# Patient Record
Sex: FEMALE | Race: WHITE | NOT HISPANIC OR LATINO
[De-identification: names, ages, dates, MRNs, and addresses within clinical notes are randomized per-mention and may not be internally consistent; named-entity substitution may affect disease eponyms.]

---

## 2017-01-25 ENCOUNTER — RX RENEWAL (OUTPATIENT)
Age: 62
End: 2017-01-25

## 2017-02-10 ENCOUNTER — RX RENEWAL (OUTPATIENT)
Age: 62
End: 2017-02-10

## 2017-02-16 ENCOUNTER — RX RENEWAL (OUTPATIENT)
Age: 62
End: 2017-02-16

## 2017-03-29 ENCOUNTER — RX RENEWAL (OUTPATIENT)
Age: 62
End: 2017-03-29

## 2017-04-04 ENCOUNTER — RESULT CHARGE (OUTPATIENT)
Age: 62
End: 2017-04-04

## 2017-04-04 ENCOUNTER — APPOINTMENT (OUTPATIENT)
Dept: INTERNAL MEDICINE | Facility: CLINIC | Age: 62
End: 2017-04-04

## 2017-04-04 VITALS
BODY MASS INDEX: 51.91 KG/M2 | WEIGHT: 293 LBS | SYSTOLIC BLOOD PRESSURE: 130 MMHG | HEIGHT: 63 IN | DIASTOLIC BLOOD PRESSURE: 90 MMHG

## 2017-04-04 LAB
GLUCOSE BLDC GLUCOMTR-MCNC: 126
HBA1C MFR BLD HPLC: 6.8

## 2017-04-19 ENCOUNTER — OTHER (OUTPATIENT)
Age: 62
End: 2017-04-19

## 2017-04-26 ENCOUNTER — RX RENEWAL (OUTPATIENT)
Age: 62
End: 2017-04-26

## 2017-04-27 LAB
25(OH)D3 SERPL-MCNC: 14.8 NG/ML
ALBUMIN SERPL ELPH-MCNC: 4.1 G/DL
ALP BLD-CCNC: 107 U/L
ALT SERPL-CCNC: 25 U/L
ANION GAP SERPL CALC-SCNC: 19 MMOL/L
AST SERPL-CCNC: 31 U/L
BASOPHILS # BLD AUTO: 0.07 K/UL
BASOPHILS NFR BLD AUTO: 0.7 %
BILIRUB SERPL-MCNC: 0.4 MG/DL
BUN SERPL-MCNC: 22 MG/DL
CALCIUM SERPL-MCNC: 9.9 MG/DL
CHLORIDE SERPL-SCNC: 100 MMOL/L
CHOLEST SERPL-MCNC: 218 MG/DL
CHOLEST/HDLC SERPL: 3 RATIO
CO2 SERPL-SCNC: 22 MMOL/L
CREAT SERPL-MCNC: 1.35 MG/DL
EOSINOPHIL # BLD AUTO: 0.19 K/UL
EOSINOPHIL NFR BLD AUTO: 2 %
GLUCOSE SERPL-MCNC: 106 MG/DL
HBA1C MFR BLD HPLC: 7.8 %
HCT VFR BLD CALC: 45.8 %
HDLC SERPL-MCNC: 73 MG/DL
HGB BLD-MCNC: 14.1 G/DL
IMM GRANULOCYTES NFR BLD AUTO: 1 %
LDLC SERPL CALC-MCNC: 114 MG/DL
LYMPHOCYTES # BLD AUTO: 2.25 K/UL
LYMPHOCYTES NFR BLD AUTO: 23.8 %
MAN DIFF?: NORMAL
MCHC RBC-ENTMCNC: 29.7 PG
MCHC RBC-ENTMCNC: 30.8 GM/DL
MCV RBC AUTO: 96.6 FL
MONOCYTES # BLD AUTO: 0.68 K/UL
MONOCYTES NFR BLD AUTO: 7.2 %
NEUTROPHILS # BLD AUTO: 6.19 K/UL
NEUTROPHILS NFR BLD AUTO: 65.3 %
PLATELET # BLD AUTO: 240 K/UL
POTASSIUM SERPL-SCNC: 4.8 MMOL/L
PROT SERPL-MCNC: 7.1 G/DL
RBC # BLD: 4.74 M/UL
RBC # FLD: 13.9 %
SODIUM SERPL-SCNC: 141 MMOL/L
T4 FREE SERPL-MCNC: 0.9 NG/DL
TRIGL SERPL-MCNC: 155 MG/DL
TSH SERPL-ACNC: 1.4 UIU/ML
WBC # FLD AUTO: 9.47 K/UL

## 2017-05-23 ENCOUNTER — MEDICATION RENEWAL (OUTPATIENT)
Age: 62
End: 2017-05-23

## 2017-05-25 ENCOUNTER — MEDICATION RENEWAL (OUTPATIENT)
Age: 62
End: 2017-05-25

## 2017-06-20 ENCOUNTER — MEDICATION RENEWAL (OUTPATIENT)
Age: 62
End: 2017-06-20

## 2017-06-21 ENCOUNTER — RX RENEWAL (OUTPATIENT)
Age: 62
End: 2017-06-21

## 2017-06-23 RX ORDER — LANCETS
EACH MISCELLANEOUS
Qty: 120 | Refills: 3 | Status: DISCONTINUED | COMMUNITY
Start: 2017-06-20 | End: 2017-06-23

## 2017-07-19 ENCOUNTER — MEDICATION RENEWAL (OUTPATIENT)
Age: 62
End: 2017-07-19

## 2017-07-25 ENCOUNTER — APPOINTMENT (OUTPATIENT)
Dept: INTERNAL MEDICINE | Facility: CLINIC | Age: 62
End: 2017-07-25

## 2017-08-21 ENCOUNTER — RX RENEWAL (OUTPATIENT)
Age: 62
End: 2017-08-21

## 2017-08-23 ENCOUNTER — APPOINTMENT (OUTPATIENT)
Dept: INTERNAL MEDICINE | Facility: CLINIC | Age: 62
End: 2017-08-23

## 2017-08-25 ENCOUNTER — APPOINTMENT (OUTPATIENT)
Dept: INTERNAL MEDICINE | Facility: CLINIC | Age: 62
End: 2017-08-25

## 2017-08-30 ENCOUNTER — APPOINTMENT (OUTPATIENT)
Dept: INTERNAL MEDICINE | Facility: CLINIC | Age: 62
End: 2017-08-30

## 2017-09-06 ENCOUNTER — MEDICATION RENEWAL (OUTPATIENT)
Age: 62
End: 2017-09-06

## 2017-09-06 ENCOUNTER — APPOINTMENT (OUTPATIENT)
Dept: INTERNAL MEDICINE | Facility: CLINIC | Age: 62
End: 2017-09-06
Payer: MEDICARE

## 2017-09-06 ENCOUNTER — RESULT CHARGE (OUTPATIENT)
Age: 62
End: 2017-09-06

## 2017-09-06 VITALS — SYSTOLIC BLOOD PRESSURE: 132 MMHG | DIASTOLIC BLOOD PRESSURE: 60 MMHG

## 2017-09-06 LAB — GLUCOSE BLDC GLUCOMTR-MCNC: 113

## 2017-09-06 PROCEDURE — 82962 GLUCOSE BLOOD TEST: CPT

## 2017-09-06 PROCEDURE — 99214 OFFICE O/P EST MOD 30 MIN: CPT | Mod: 25

## 2017-09-06 RX ORDER — CEPHALEXIN 500 MG/1
500 TABLET ORAL 3 TIMES DAILY
Qty: 30 | Refills: 0 | Status: COMPLETED | COMMUNITY
Start: 2017-09-06 | End: 2017-09-16

## 2017-09-27 ENCOUNTER — APPOINTMENT (OUTPATIENT)
Dept: CHRONIC DISEASE MANAGEMENT | Facility: CLINIC | Age: 62
End: 2017-09-27

## 2017-10-17 ENCOUNTER — MEDICATION RENEWAL (OUTPATIENT)
Age: 62
End: 2017-10-17

## 2017-11-06 ENCOUNTER — RX RENEWAL (OUTPATIENT)
Age: 62
End: 2017-11-06

## 2017-11-08 ENCOUNTER — MEDICATION RENEWAL (OUTPATIENT)
Age: 62
End: 2017-11-08

## 2017-11-10 ENCOUNTER — LABORATORY RESULT (OUTPATIENT)
Age: 62
End: 2017-11-10

## 2017-11-16 ENCOUNTER — RX RENEWAL (OUTPATIENT)
Age: 62
End: 2017-11-16

## 2017-12-05 ENCOUNTER — APPOINTMENT (OUTPATIENT)
Dept: INTERNAL MEDICINE | Facility: CLINIC | Age: 62
End: 2017-12-05
Payer: MEDICARE

## 2017-12-05 VITALS — DIASTOLIC BLOOD PRESSURE: 50 MMHG | SYSTOLIC BLOOD PRESSURE: 114 MMHG

## 2017-12-05 DIAGNOSIS — Z85.42 PERSONAL HISTORY OF MALIGNANT NEOPLASM OF OTHER PARTS OF UTERUS: ICD-10-CM

## 2017-12-05 DIAGNOSIS — L97.522 NON-PRESSURE CHRONIC ULCER OF OTHER PART OF LEFT FOOT WITH FAT LAYER EXPOSED: ICD-10-CM

## 2017-12-05 DIAGNOSIS — Z86.31 PERSONAL HISTORY OF DIABETIC FOOT ULCER: ICD-10-CM

## 2017-12-05 DIAGNOSIS — L97.509 PERSONAL HISTORY OF DIABETIC FOOT ULCER: ICD-10-CM

## 2017-12-05 DIAGNOSIS — L97.523 NON-PRESSURE CHRONIC ULCER OF OTHER PART OF LEFT FOOT WITH NECROSIS OF MUSCLE: ICD-10-CM

## 2017-12-05 DIAGNOSIS — L97.512 NON-PRESSURE CHRONIC ULCER OF OTHER PART OF RIGHT FOOT WITH FAT LAYER EXPOSED: ICD-10-CM

## 2017-12-05 LAB
GLUCOSE BLDC GLUCOMTR-MCNC: 71
HBA1C MFR BLD HPLC: 7.8

## 2017-12-05 PROCEDURE — G0008: CPT

## 2017-12-05 PROCEDURE — 90732 PPSV23 VACC 2 YRS+ SUBQ/IM: CPT

## 2017-12-05 PROCEDURE — 83036 HEMOGLOBIN GLYCOSYLATED A1C: CPT | Mod: QW

## 2017-12-05 PROCEDURE — 82962 GLUCOSE BLOOD TEST: CPT

## 2017-12-05 PROCEDURE — 90688 IIV4 VACCINE SPLT 0.5 ML IM: CPT

## 2017-12-05 PROCEDURE — 99214 OFFICE O/P EST MOD 30 MIN: CPT | Mod: 25

## 2017-12-05 PROCEDURE — G0009: CPT

## 2017-12-05 PROCEDURE — 90472 IMMUNIZATION ADMIN EACH ADD: CPT

## 2017-12-14 ENCOUNTER — RX RENEWAL (OUTPATIENT)
Age: 62
End: 2017-12-14

## 2017-12-30 RX ORDER — CEPHALEXIN 500 MG/1
500 CAPSULE ORAL
Qty: 20 | Refills: 0 | Status: DISCONTINUED | COMMUNITY
Start: 2017-12-05 | End: 2017-12-30

## 2018-01-11 ENCOUNTER — RX RENEWAL (OUTPATIENT)
Age: 63
End: 2018-01-11

## 2018-02-10 ENCOUNTER — RX RENEWAL (OUTPATIENT)
Age: 63
End: 2018-02-10

## 2018-02-14 ENCOUNTER — RX RENEWAL (OUTPATIENT)
Age: 63
End: 2018-02-14

## 2018-02-15 ENCOUNTER — RX RENEWAL (OUTPATIENT)
Age: 63
End: 2018-02-15

## 2018-03-12 ENCOUNTER — APPOINTMENT (OUTPATIENT)
Dept: OBGYN | Facility: CLINIC | Age: 63
End: 2018-03-12
Payer: MEDICARE

## 2018-03-12 VITALS — SYSTOLIC BLOOD PRESSURE: 177 MMHG | DIASTOLIC BLOOD PRESSURE: 70 MMHG | HEART RATE: 67 BPM | HEIGHT: 63 IN

## 2018-03-12 PROCEDURE — 99386 PREV VISIT NEW AGE 40-64: CPT

## 2018-03-13 ENCOUNTER — RX RENEWAL (OUTPATIENT)
Age: 63
End: 2018-03-13

## 2018-03-14 ENCOUNTER — RX RENEWAL (OUTPATIENT)
Age: 63
End: 2018-03-14

## 2018-04-11 ENCOUNTER — RX RENEWAL (OUTPATIENT)
Age: 63
End: 2018-04-11

## 2018-05-07 ENCOUNTER — APPOINTMENT (OUTPATIENT)
Dept: INTERNAL MEDICINE | Facility: CLINIC | Age: 63
End: 2018-05-07
Payer: MEDICARE

## 2018-05-07 VITALS — DIASTOLIC BLOOD PRESSURE: 52 MMHG | HEART RATE: 66 BPM | OXYGEN SATURATION: 98 % | SYSTOLIC BLOOD PRESSURE: 116 MMHG

## 2018-05-07 DIAGNOSIS — K59.09 OTHER CONSTIPATION: ICD-10-CM

## 2018-05-07 LAB
GLUCOSE BLDC GLUCOMTR-MCNC: 141
HBA1C MFR BLD HPLC: 7.4

## 2018-05-07 PROCEDURE — 82962 GLUCOSE BLOOD TEST: CPT

## 2018-05-07 PROCEDURE — 99214 OFFICE O/P EST MOD 30 MIN: CPT | Mod: 25

## 2018-05-07 PROCEDURE — 83036 HEMOGLOBIN GLYCOSYLATED A1C: CPT | Mod: QW

## 2018-05-09 ENCOUNTER — RX RENEWAL (OUTPATIENT)
Age: 63
End: 2018-05-09

## 2018-05-09 LAB
25(OH)D3 SERPL-MCNC: 18.6 NG/ML
ALBUMIN SERPL ELPH-MCNC: 4.1 G/DL
ALP BLD-CCNC: 93 U/L
ALT SERPL-CCNC: 17 U/L
ANION GAP SERPL CALC-SCNC: 18 MMOL/L
AST SERPL-CCNC: 23 U/L
BASOPHILS # BLD AUTO: 0.06 K/UL
BASOPHILS NFR BLD AUTO: 0.5 %
BILIRUB SERPL-MCNC: 0.4 MG/DL
BUN SERPL-MCNC: 25 MG/DL
CALCIUM SERPL-MCNC: 9.7 MG/DL
CHLORIDE SERPL-SCNC: 103 MMOL/L
CHOLEST SERPL-MCNC: 175 MG/DL
CHOLEST/HDLC SERPL: 2.8 RATIO
CO2 SERPL-SCNC: 25 MMOL/L
CREAT SERPL-MCNC: 1.47 MG/DL
EOSINOPHIL # BLD AUTO: 0.15 K/UL
EOSINOPHIL NFR BLD AUTO: 1.3 %
GLUCOSE SERPL-MCNC: 125 MG/DL
HCT VFR BLD CALC: 44.1 %
HDLC SERPL-MCNC: 62 MG/DL
HGB BLD-MCNC: 13.6 G/DL
IMM GRANULOCYTES NFR BLD AUTO: 0.5 %
LDLC SERPL CALC-MCNC: 78 MG/DL
LYMPHOCYTES # BLD AUTO: 2.12 K/UL
LYMPHOCYTES NFR BLD AUTO: 18.5 %
MAN DIFF?: NORMAL
MCHC RBC-ENTMCNC: 29.2 PG
MCHC RBC-ENTMCNC: 30.8 GM/DL
MCV RBC AUTO: 94.8 FL
MONOCYTES # BLD AUTO: 0.77 K/UL
MONOCYTES NFR BLD AUTO: 6.7 %
NEUTROPHILS # BLD AUTO: 8.27 K/UL
NEUTROPHILS NFR BLD AUTO: 72.5 %
PLATELET # BLD AUTO: 245 K/UL
POTASSIUM SERPL-SCNC: 4.7 MMOL/L
PROT SERPL-MCNC: 7.3 G/DL
RBC # BLD: 4.65 M/UL
RBC # FLD: 14.7 %
SODIUM SERPL-SCNC: 146 MMOL/L
T4 FREE SERPL-MCNC: 0.9 NG/DL
TRIGL SERPL-MCNC: 175 MG/DL
TSH SERPL-ACNC: 2.27 UIU/ML
WBC # FLD AUTO: 11.43 K/UL

## 2018-05-23 ENCOUNTER — APPOINTMENT (OUTPATIENT)
Dept: GASTROENTEROLOGY | Facility: CLINIC | Age: 63
End: 2018-05-23
Payer: MEDICARE

## 2018-05-23 VITALS
OXYGEN SATURATION: 98 % | SYSTOLIC BLOOD PRESSURE: 124 MMHG | HEIGHT: 63 IN | WEIGHT: 293 LBS | TEMPERATURE: 98.5 F | BODY MASS INDEX: 51.91 KG/M2 | RESPIRATION RATE: 16 BRPM | DIASTOLIC BLOOD PRESSURE: 62 MMHG | HEART RATE: 72 BPM

## 2018-05-23 DIAGNOSIS — R19.5 OTHER FECAL ABNORMALITIES: ICD-10-CM

## 2018-05-23 PROCEDURE — 99203 OFFICE O/P NEW LOW 30 MIN: CPT

## 2018-05-26 ENCOUNTER — APPOINTMENT (OUTPATIENT)
Dept: MAMMOGRAPHY | Facility: CLINIC | Age: 63
End: 2018-05-26

## 2018-05-30 ENCOUNTER — CLINICAL ADVICE (OUTPATIENT)
Age: 63
End: 2018-05-30

## 2018-05-30 RX ORDER — SULFAMETHOXAZOLE AND TRIMETHOPRIM 800; 160 MG/1; MG/1
800-160 TABLET ORAL TWICE DAILY
Qty: 20 | Refills: 0 | Status: COMPLETED | COMMUNITY
Start: 2018-05-30 | End: 2018-06-09

## 2018-06-04 ENCOUNTER — FORM ENCOUNTER (OUTPATIENT)
Age: 63
End: 2018-06-04

## 2018-06-05 ENCOUNTER — APPOINTMENT (OUTPATIENT)
Dept: MAMMOGRAPHY | Facility: CLINIC | Age: 63
End: 2018-06-05
Payer: MEDICARE

## 2018-06-05 ENCOUNTER — OUTPATIENT (OUTPATIENT)
Dept: OUTPATIENT SERVICES | Facility: HOSPITAL | Age: 63
LOS: 1 days | End: 2018-06-05

## 2018-06-05 ENCOUNTER — OUTPATIENT (OUTPATIENT)
Dept: OUTPATIENT SERVICES | Facility: HOSPITAL | Age: 63
LOS: 1 days | End: 2018-06-05
Payer: COMMERCIAL

## 2018-06-05 DIAGNOSIS — Z12.31 ENCOUNTER FOR SCREENING MAMMOGRAM FOR MALIGNANT NEOPLASM OF BREAST: ICD-10-CM

## 2018-06-05 PROCEDURE — 77063 BREAST TOMOSYNTHESIS BI: CPT

## 2018-06-05 PROCEDURE — 77063 BREAST TOMOSYNTHESIS BI: CPT | Mod: 26

## 2018-06-05 PROCEDURE — 77067 SCR MAMMO BI INCL CAD: CPT | Mod: 26

## 2018-06-05 PROCEDURE — 77067 SCR MAMMO BI INCL CAD: CPT

## 2018-06-06 ENCOUNTER — RX RENEWAL (OUTPATIENT)
Age: 63
End: 2018-06-06

## 2018-06-28 ENCOUNTER — RX RENEWAL (OUTPATIENT)
Age: 63
End: 2018-06-28

## 2018-06-28 ENCOUNTER — OUTPATIENT (OUTPATIENT)
Dept: OUTPATIENT SERVICES | Facility: HOSPITAL | Age: 63
LOS: 1 days | End: 2018-06-28
Payer: COMMERCIAL

## 2018-06-28 VITALS
WEIGHT: 293 LBS | SYSTOLIC BLOOD PRESSURE: 134 MMHG | TEMPERATURE: 98 F | HEART RATE: 68 BPM | DIASTOLIC BLOOD PRESSURE: 78 MMHG | RESPIRATION RATE: 15 BRPM | HEIGHT: 63 IN | OXYGEN SATURATION: 97 %

## 2018-06-28 DIAGNOSIS — Z98.890 OTHER SPECIFIED POSTPROCEDURAL STATES: Chronic | ICD-10-CM

## 2018-06-28 DIAGNOSIS — Z01.818 ENCOUNTER FOR OTHER PREPROCEDURAL EXAMINATION: ICD-10-CM

## 2018-06-28 DIAGNOSIS — G47.33 OBSTRUCTIVE SLEEP APNEA (ADULT) (PEDIATRIC): ICD-10-CM

## 2018-06-28 DIAGNOSIS — I10 ESSENTIAL (PRIMARY) HYPERTENSION: ICD-10-CM

## 2018-06-28 DIAGNOSIS — R19.5 OTHER FECAL ABNORMALITIES: ICD-10-CM

## 2018-06-28 DIAGNOSIS — E11.9 TYPE 2 DIABETES MELLITUS WITHOUT COMPLICATIONS: ICD-10-CM

## 2018-06-28 DIAGNOSIS — E66.01 MORBID (SEVERE) OBESITY DUE TO EXCESS CALORIES: ICD-10-CM

## 2018-06-28 LAB
ANION GAP SERPL CALC-SCNC: 14 MMOL/L — SIGNIFICANT CHANGE UP (ref 5–17)
BUN SERPL-MCNC: 33 MG/DL — HIGH (ref 7–23)
CALCIUM SERPL-MCNC: 10 MG/DL — SIGNIFICANT CHANGE UP (ref 8.4–10.5)
CHLORIDE SERPL-SCNC: 100 MMOL/L — SIGNIFICANT CHANGE UP (ref 96–108)
CO2 SERPL-SCNC: 25 MMOL/L — SIGNIFICANT CHANGE UP (ref 22–31)
CREAT SERPL-MCNC: 1.66 MG/DL — HIGH (ref 0.5–1.3)
GLUCOSE SERPL-MCNC: 97 MG/DL — SIGNIFICANT CHANGE UP (ref 70–99)
HBA1C BLD-MCNC: 7.3 % — HIGH (ref 4–5.6)
HCT VFR BLD CALC: 44.2 % — SIGNIFICANT CHANGE UP (ref 34.5–45)
HGB BLD-MCNC: 14.4 G/DL — SIGNIFICANT CHANGE UP (ref 11.5–15.5)
MCHC RBC-ENTMCNC: 29.8 PG — SIGNIFICANT CHANGE UP (ref 27–34)
MCHC RBC-ENTMCNC: 32.6 GM/DL — SIGNIFICANT CHANGE UP (ref 32–36)
MCV RBC AUTO: 91.5 FL — SIGNIFICANT CHANGE UP (ref 80–100)
PLATELET # BLD AUTO: 222 K/UL — SIGNIFICANT CHANGE UP (ref 150–400)
POTASSIUM SERPL-MCNC: 4.3 MMOL/L — SIGNIFICANT CHANGE UP (ref 3.5–5.3)
POTASSIUM SERPL-SCNC: 4.3 MMOL/L — SIGNIFICANT CHANGE UP (ref 3.5–5.3)
RBC # BLD: 4.83 M/UL — SIGNIFICANT CHANGE UP (ref 3.8–5.2)
RBC # FLD: 14.9 % — HIGH (ref 10.3–14.5)
SODIUM SERPL-SCNC: 139 MMOL/L — SIGNIFICANT CHANGE UP (ref 135–145)
WBC # BLD: 10.11 K/UL — SIGNIFICANT CHANGE UP (ref 3.8–10.5)
WBC # FLD AUTO: 10.11 K/UL — SIGNIFICANT CHANGE UP (ref 3.8–10.5)

## 2018-06-28 PROCEDURE — 85027 COMPLETE CBC AUTOMATED: CPT

## 2018-06-28 PROCEDURE — 83036 HEMOGLOBIN GLYCOSYLATED A1C: CPT

## 2018-06-28 PROCEDURE — G0463: CPT

## 2018-06-28 PROCEDURE — 80048 BASIC METABOLIC PNL TOTAL CA: CPT

## 2018-06-28 NOTE — H&P PST ADULT - PROBLEM SELECTOR PLAN 1
Scheduled Colonoscopy on 7/5/2018  CBC BMP A1c sent at PST visit  Pt to c/w low-dose aspirin, spoke to Rae (sx coordinator at Dr. Gillespie's office)   Pt unable to stand to ck weight- pt states last weight was 295lbs from a few weeks ago- Rae @ Dr. Urbina's office aware, discussed with endo booking  Endo booking aware of pt's latex allergy  Pre-op instructions given to pt- verbalizes understanding Scheduled Colonoscopy on 7/5/2018  CBC BMP A1c sent at PST visit  Pt to c/w low-dose aspirin, spoke to Rae (sx coordinator at Dr. Gillespie's office), also discussed with Dr. Mcghee   Pt unable to stand to check weight- pt states last weight was 295lbs from a few weeks ago- Rae @ Dr. Urbina's office aware, discussed with endo booking  Endo booking aware of pt's latex allergy  Pre-op instructions given to pt- verbalizes understanding

## 2018-06-28 NOTE — H&P PST ADULT - LAST STRESS TEST
05/2007 @ [facility] 2007 @ [facility], [facility] per pt

## 2018-06-28 NOTE — H&P PST ADULT - BP NONINVASIVE DIASTOLIC (MM HG)
atient called stating he needs a refill on his tamsulosin 0.4 mg daily for history of BPH and urinary difficulties.  A prescription was described to his pharmacy at [facility] for tamsulosin 0.4 mg 1 tablet daily #30 with 6 refills.  
78

## 2018-06-28 NOTE — H&P PST ADULT - PSH
C Section 1994    Cataract extracted with lens implant 1998  Right  Cervical Spinal Stenosis surgery x2 (01/2002, 7/2002)    Cholecystectomy/appendectomy @ age 25    D&C 2008    D&C x2 1980's    Endometrial biopsy 12/02/09    Tonsillectomy as a child C Section 1994    Cataract extracted with lens implant 1998  Right  Cervical Spinal Stenosis surgery x2 (01/2002, 7/2002)    Cholecystectomy/appendectomy @ age 25    D&C 2008  hysteroscopy, endometrial hyperplasia, 2009  D&C x2 1980's    Endometrial biopsy 12/02/09    H/O colonoscopy  1998  H/O laser iridotomy  left eye, 2016  Tonsillectomy as a child

## 2018-06-28 NOTE — H&P PST ADULT - HISTORY OF PRESENT ILLNESS
54 y.o  female presents with hx of endometrial hyperplasia diagnosed on 08/09. Now scheduled for D&C, hysteroscopy on 12/29/09 64 y/o obese female with PMHx of HTN, T2DM, morbid obesity, CKD wheelchair bound 2002 62 y/o obese female with PMHx of HTN, T2DM, morbid obesity, CKD, wheelchair bound since 2002 was referred to Dr. Mccann for (+) FOBT. Patient reports chronic constipation secondary to pain meds. Denies abdominal pain, N/V/D, fever, chills or change in bowel habits. Presents to PST for a scheduled Colonoscopy on 7/5/2018.

## 2018-06-28 NOTE — H&P PST ADULT - PMH
Cataract    Cervical Stenosis of Spine    Deep Vein Thrombosis (DVT)  Left leg, 2004  Diabetes Mellitus Type II    Dyslipidemia    Endometrial Hyperplasia    HTN (Hypertension)    Morbid Obesity    Spinal Stenosis, Lumbar Cataract    Cervical Stenosis of Spine    CKD (chronic kidney disease)    Deep Vein Thrombosis (DVT)  Left leg, 2004  Diabetes Mellitus Type II    Dyslipidemia    Edema of lower extremity  on lasix  Endometrial Hyperplasia    HTN (Hypertension)    Insomnia    Morbid Obesity    Narrow angle glaucoma of left eye    Spinal Stenosis, Lumbar    Vitamin D deficiency Cataract    Cervical Stenosis of Spine    CKD (chronic kidney disease)    Deep Vein Thrombosis (DVT)  Left leg, 2004, treated and resolved  Diabetes Mellitus Type II    Dyslipidemia    Edema of lower extremity  on lasix  Endometrial Hyperplasia    HTN (Hypertension)    Insomnia    Morbid Obesity    Narrow angle glaucoma of left eye    Other fecal abnormalities  +iFOBT  Spinal Stenosis, Lumbar    Vitamin D deficiency

## 2018-06-28 NOTE — H&P PST ADULT - NSANTHOSAYNRD_GEN_A_CORE
No. ENRIQUE screening performed.  STOP BANG Legend: 0-2 = LOW Risk; 3-4 = INTERMEDIATE Risk; 5-8 = HIGH Risk

## 2018-06-28 NOTE — H&P PST ADULT - PROBLEM SELECTOR PLAN 3
Instructed pt to take NPH 70 units night before sx and NPH 37 units in am of sx -pt to take FS DOS at home  Instructed pt hold Novolog insulin on DOS, last dose 7/4 pm  FS on DOS

## 2018-07-02 ENCOUNTER — RX RENEWAL (OUTPATIENT)
Age: 63
End: 2018-07-02

## 2018-07-05 ENCOUNTER — OUTPATIENT (OUTPATIENT)
Dept: OUTPATIENT SERVICES | Facility: HOSPITAL | Age: 63
LOS: 1 days | End: 2018-07-05
Payer: COMMERCIAL

## 2018-07-05 ENCOUNTER — RESULT REVIEW (OUTPATIENT)
Age: 63
End: 2018-07-05

## 2018-07-05 ENCOUNTER — APPOINTMENT (OUTPATIENT)
Dept: GASTROENTEROLOGY | Facility: HOSPITAL | Age: 63
End: 2018-07-05

## 2018-07-05 ENCOUNTER — RX RENEWAL (OUTPATIENT)
Age: 63
End: 2018-07-05

## 2018-07-05 DIAGNOSIS — Z98.890 OTHER SPECIFIED POSTPROCEDURAL STATES: Chronic | ICD-10-CM

## 2018-07-05 DIAGNOSIS — R19.5 OTHER FECAL ABNORMALITIES: ICD-10-CM

## 2018-07-05 LAB — GLUCOSE BLDC GLUCOMTR-MCNC: 106 MG/DL — HIGH (ref 70–99)

## 2018-07-05 PROCEDURE — 45385 COLONOSCOPY W/LESION REMOVAL: CPT | Mod: GC

## 2018-07-05 PROCEDURE — 82962 GLUCOSE BLOOD TEST: CPT

## 2018-07-05 PROCEDURE — 45385 COLONOSCOPY W/LESION REMOVAL: CPT

## 2018-07-06 LAB — SURGICAL PATHOLOGY STUDY: SIGNIFICANT CHANGE UP

## 2018-07-15 ENCOUNTER — RX RENEWAL (OUTPATIENT)
Age: 63
End: 2018-07-15

## 2018-08-06 PROBLEM — G47.00 INSOMNIA, UNSPECIFIED: Chronic | Status: ACTIVE | Noted: 2018-06-28

## 2018-08-06 PROBLEM — E55.9 VITAMIN D DEFICIENCY, UNSPECIFIED: Chronic | Status: ACTIVE | Noted: 2018-06-28

## 2018-08-09 ENCOUNTER — RX RENEWAL (OUTPATIENT)
Age: 63
End: 2018-08-09

## 2018-08-27 ENCOUNTER — APPOINTMENT (OUTPATIENT)
Dept: INTERNAL MEDICINE | Facility: CLINIC | Age: 63
End: 2018-08-27
Payer: MEDICARE

## 2018-08-27 VITALS — OXYGEN SATURATION: 97 % | HEART RATE: 73 BPM | DIASTOLIC BLOOD PRESSURE: 64 MMHG | SYSTOLIC BLOOD PRESSURE: 112 MMHG

## 2018-08-27 DIAGNOSIS — Z85.42 PERSONAL HISTORY OF MALIGNANT NEOPLASM OF OTHER PARTS OF UTERUS: ICD-10-CM

## 2018-08-27 DIAGNOSIS — L03.119 CELLULITIS OF UNSPECIFIED PART OF LIMB: ICD-10-CM

## 2018-08-27 LAB
GLUCOSE BLDC GLUCOMTR-MCNC: 135
HBA1C MFR BLD HPLC: 8.2

## 2018-08-27 PROCEDURE — 82962 GLUCOSE BLOOD TEST: CPT

## 2018-08-27 PROCEDURE — 99214 OFFICE O/P EST MOD 30 MIN: CPT | Mod: 25

## 2018-08-27 PROCEDURE — 83036 HEMOGLOBIN GLYCOSYLATED A1C: CPT | Mod: QW

## 2018-08-27 NOTE — HISTORY OF PRESENT ILLNESS
[Diabetes Mellitus] : Diabetes Mellitus [Hyperlipidemia] : Hyperlipidemia [Hypertension] : Hypertension [Obesity] : Obesity [Check glucose ___ x/day] : Patient checks  blood glucose [unfilled]  times a day [Fasting:  ___] : Fasting Blood Sugar: [unfilled] mg/dL [Understanding of foot care] : Patient expressed understanding of foot care [Most Recent A1C: ___] : Most recent A1C was [unfilled] [Target A1C:  ___] : Target A1C is [unfilled] [Near target goal] : A1C near target goal [Moderate Intensity] : Patient is currently on moderate intensity statin  therapy [120/80] : Target blood pressure is 120/80 [Target goal met] : BP target goal met [Doing Well] : Patient is doing well [Moderate Intensity Therapy] : Patient is currently on moderate intensity statin  therapy [None] : No weight lost attempts [Sedentary] : Patient is sedentary [Needs reinforcement, provided] : Patient needs reinforcement on understanding of disease, goals and obesity follow-up plan; reinforcement was provided [No episodes] : No hypoglycemic episodes since the last visit. [] : sometimes in range [EyeExamDate] : needs to go

## 2018-08-27 NOTE — COUNSELING
[Weight management counseling provided] : Weight management [Healthy eating counseling provided] : healthy eating [Take medications as directed] : Take medications as directed [Check sugar ___ times per week] : Check blood sugar [unfilled]  times per week [Check feet daily] : Check feet daily [Keep FS  log to bring to next visit] : Keep finger stick log and  bring  to next visit [Transportation Issues] : Transportation issues [Other: ____] : [unfilled] [Needs reinforcement, provided] : Patient needs reinforcement on understanding lifestyle changes and  the steps needed to achieve self management goals and reinforcement was provided

## 2018-08-27 NOTE — ASSESSMENT
[FreeTextEntry1] : \par 1.  DM - Yanet's HgbA1C has gone back up because of dietary indescretion.  Needs eye exam.\par 2.  Lower extremity edema with cool foot on right - to take lasix daily.  To inspect feet daily, wear compression stockings if she can get them on.  She never went for her arterial dopplers.  She was advised to mariajose ramirez make the appointment Keep feet warm.\par 3.  Hyperlipidemia - tolerating a statin well.  Continue current management.\par 4.  On chronic opioids for pain management.  No aberrant behavior noted.\par 5.  HTN - BP is acceptable.  Continue current management.\par 6.  FIT positive - finally had a colonoscopy that revealed  multiple polyps - tubular adenomas.  Needs a repeat colonoscopy in 3-5 yrs\par 7.  RTO 3 mos or prn

## 2018-08-27 NOTE — PHYSICAL EXAM
[No Acute Distress] : no acute distress [Well Nourished] : well nourished [Well Developed] : well developed [Well-Appearing] : well-appearing [Normal Sclera/Conjunctiva] : normal sclera/conjunctiva [EOMI] : extraocular movements intact [No JVD] : no jugular venous distention [No Respiratory Distress] : no respiratory distress  [Clear to Auscultation] : lungs were clear to auscultation bilaterally [Normal Rate] : normal rate  [Regular Rhythm] : with a regular rhythm [Normal S1, S2] : normal S1 and S2 [Soft] : abdomen soft [Non Tender] : non-tender [Comprehensive Foot Exam Normal] : Right and left foot were examined and both feet are normal. No ulcers in either foot. Toes are normal and with full ROM.  Normal tactile sensation with monofilament testing throughout both feet [de-identified] : 2-3 + edema with chronic venous stasis changes L>R.  Dusky right foot >> left foot with cool toes.

## 2018-09-05 ENCOUNTER — RX RENEWAL (OUTPATIENT)
Age: 63
End: 2018-09-05

## 2018-10-03 ENCOUNTER — RX RENEWAL (OUTPATIENT)
Age: 63
End: 2018-10-03

## 2018-10-18 ENCOUNTER — FORM ENCOUNTER (OUTPATIENT)
Age: 63
End: 2018-10-18

## 2018-10-19 ENCOUNTER — OUTPATIENT (OUTPATIENT)
Dept: OUTPATIENT SERVICES | Facility: HOSPITAL | Age: 63
LOS: 1 days | End: 2018-10-19
Payer: COMMERCIAL

## 2018-10-19 ENCOUNTER — APPOINTMENT (OUTPATIENT)
Dept: ULTRASOUND IMAGING | Facility: HOSPITAL | Age: 63
End: 2018-10-19
Payer: MEDICARE

## 2018-10-19 DIAGNOSIS — Z98.890 OTHER SPECIFIED POSTPROCEDURAL STATES: Chronic | ICD-10-CM

## 2018-10-19 DIAGNOSIS — I73.9 PERIPHERAL VASCULAR DISEASE, UNSPECIFIED: ICD-10-CM

## 2018-10-19 PROCEDURE — 93923 UPR/LXTR ART STDY 3+ LVLS: CPT | Mod: 26

## 2018-10-19 PROCEDURE — 93923 UPR/LXTR ART STDY 3+ LVLS: CPT

## 2018-10-30 ENCOUNTER — RX RENEWAL (OUTPATIENT)
Age: 63
End: 2018-10-30

## 2018-11-05 ENCOUNTER — INPATIENT (INPATIENT)
Facility: HOSPITAL | Age: 63
LOS: 3 days | Discharge: HOME | DRG: 74 | End: 2018-11-09
Attending: HOSPITALIST | Admitting: HOSPITALIST
Payer: COMMERCIAL

## 2018-11-05 VITALS
OXYGEN SATURATION: 98 % | TEMPERATURE: 98 F | DIASTOLIC BLOOD PRESSURE: 63 MMHG | SYSTOLIC BLOOD PRESSURE: 143 MMHG | RESPIRATION RATE: 20 BRPM | WEIGHT: 293 LBS | HEART RATE: 108 BPM | HEIGHT: 63 IN

## 2018-11-05 DIAGNOSIS — Z98.890 OTHER SPECIFIED POSTPROCEDURAL STATES: Chronic | ICD-10-CM

## 2018-11-05 LAB
ANION GAP SERPL CALC-SCNC: 14 MMOL/L — SIGNIFICANT CHANGE UP (ref 5–17)
ANION GAP SERPL CALC-SCNC: 15 MMOL/L — SIGNIFICANT CHANGE UP (ref 5–17)
APTT BLD: 29 SEC — SIGNIFICANT CHANGE UP (ref 27.5–36.3)
BASOPHILS # BLD AUTO: 0.1 K/UL — SIGNIFICANT CHANGE UP (ref 0–0.2)
BASOPHILS NFR BLD AUTO: 0.6 % — SIGNIFICANT CHANGE UP (ref 0–2)
BUN SERPL-MCNC: 27 MG/DL — HIGH (ref 7–23)
BUN SERPL-MCNC: 30 MG/DL — HIGH (ref 7–23)
CALCIUM SERPL-MCNC: 9.6 MG/DL — SIGNIFICANT CHANGE UP (ref 8.4–10.5)
CALCIUM SERPL-MCNC: 9.8 MG/DL — SIGNIFICANT CHANGE UP (ref 8.4–10.5)
CHLORIDE SERPL-SCNC: 103 MMOL/L — SIGNIFICANT CHANGE UP (ref 96–108)
CHLORIDE SERPL-SCNC: 108 MMOL/L — SIGNIFICANT CHANGE UP (ref 96–108)
CO2 SERPL-SCNC: 20 MMOL/L — LOW (ref 22–31)
CO2 SERPL-SCNC: 21 MMOL/L — LOW (ref 22–31)
CREAT SERPL-MCNC: 1.43 MG/DL — HIGH (ref 0.5–1.3)
CREAT SERPL-MCNC: 1.53 MG/DL — HIGH (ref 0.5–1.3)
EOSINOPHIL # BLD AUTO: 0.2 K/UL — SIGNIFICANT CHANGE UP (ref 0–0.5)
EOSINOPHIL NFR BLD AUTO: 2.1 % — SIGNIFICANT CHANGE UP (ref 0–6)
GLUCOSE SERPL-MCNC: 72 MG/DL — SIGNIFICANT CHANGE UP (ref 70–99)
GLUCOSE SERPL-MCNC: 88 MG/DL — SIGNIFICANT CHANGE UP (ref 70–99)
HCT VFR BLD CALC: 46.1 % — HIGH (ref 34.5–45)
HGB BLD-MCNC: 14.8 G/DL — SIGNIFICANT CHANGE UP (ref 11.5–15.5)
INR BLD: 0.95 RATIO — SIGNIFICANT CHANGE UP (ref 0.88–1.16)
LYMPHOCYTES # BLD AUTO: 2.7 K/UL — SIGNIFICANT CHANGE UP (ref 1–3.3)
LYMPHOCYTES # BLD AUTO: 23.5 % — SIGNIFICANT CHANGE UP (ref 13–44)
MCHC RBC-ENTMCNC: 29.2 PG — SIGNIFICANT CHANGE UP (ref 27–34)
MCHC RBC-ENTMCNC: 32.1 GM/DL — SIGNIFICANT CHANGE UP (ref 32–36)
MCV RBC AUTO: 91 FL — SIGNIFICANT CHANGE UP (ref 80–100)
MONOCYTES # BLD AUTO: 0.8 K/UL — SIGNIFICANT CHANGE UP (ref 0–0.9)
MONOCYTES NFR BLD AUTO: 6.8 % — SIGNIFICANT CHANGE UP (ref 2–14)
NEUTROPHILS # BLD AUTO: 7.8 K/UL — HIGH (ref 1.8–7.4)
NEUTROPHILS NFR BLD AUTO: 67 % — SIGNIFICANT CHANGE UP (ref 43–77)
PLATELET # BLD AUTO: 239 K/UL — SIGNIFICANT CHANGE UP (ref 150–400)
POTASSIUM SERPL-MCNC: 4.9 MMOL/L — SIGNIFICANT CHANGE UP (ref 3.5–5.3)
POTASSIUM SERPL-MCNC: 6.9 MMOL/L — CRITICAL HIGH (ref 3.5–5.3)
POTASSIUM SERPL-SCNC: 4.9 MMOL/L — SIGNIFICANT CHANGE UP (ref 3.5–5.3)
POTASSIUM SERPL-SCNC: 6.9 MMOL/L — CRITICAL HIGH (ref 3.5–5.3)
PROTHROM AB SERPL-ACNC: 10.8 SEC — SIGNIFICANT CHANGE UP (ref 10–12.9)
RBC # BLD: 5.07 M/UL — SIGNIFICANT CHANGE UP (ref 3.8–5.2)
RBC # FLD: 13.9 % — SIGNIFICANT CHANGE UP (ref 10.3–14.5)
SODIUM SERPL-SCNC: 138 MMOL/L — SIGNIFICANT CHANGE UP (ref 135–145)
SODIUM SERPL-SCNC: 143 MMOL/L — SIGNIFICANT CHANGE UP (ref 135–145)
WBC # BLD: 11.6 K/UL — HIGH (ref 3.8–10.5)
WBC # FLD AUTO: 11.6 K/UL — HIGH (ref 3.8–10.5)

## 2018-11-05 PROCEDURE — 73590 X-RAY EXAM OF LOWER LEG: CPT | Mod: 26,RT

## 2018-11-05 PROCEDURE — 93971 EXTREMITY STUDY: CPT | Mod: 26

## 2018-11-05 PROCEDURE — 99285 EMERGENCY DEPT VISIT HI MDM: CPT

## 2018-11-05 RX ORDER — OXYCODONE HYDROCHLORIDE 5 MG/1
5 TABLET ORAL ONCE
Qty: 0 | Refills: 0 | Status: DISCONTINUED | OUTPATIENT
Start: 2018-11-05 | End: 2018-11-05

## 2018-11-05 RX ORDER — MORPHINE SULFATE 50 MG/1
4 CAPSULE, EXTENDED RELEASE ORAL ONCE
Qty: 0 | Refills: 0 | Status: DISCONTINUED | OUTPATIENT
Start: 2018-11-05 | End: 2018-11-06

## 2018-11-05 RX ADMIN — OXYCODONE HYDROCHLORIDE 5 MILLIGRAM(S): 5 TABLET ORAL at 20:05

## 2018-11-05 RX ADMIN — OXYCODONE HYDROCHLORIDE 5 MILLIGRAM(S): 5 TABLET ORAL at 21:00

## 2018-11-05 NOTE — ED PROVIDER NOTE - PHYSICAL EXAMINATION
Physical Exam:  Gen: NAD, AOx3, non-toxic appearing, obese habitus  Head: NCAT  HEENT: normal conjunctiva, oral mucosa moist  Lung: CTAB, no respiratory distress, no wheezes/rhonchi/rales B/L, speaking in full sentences  CV: RRR, no murmurs, rubs or gallops  Abd: soft, NT, distended, no guarding  MSK: no visible deformities, swelling RLE, no rash on calf, tenderness posterior calf, no foot tenderness, pulses intact and equal bilateral, ROM normal in UE/LE  Neuro: No focal sensory or motor deficits  Skin: feet cold, however, well perfused, no rash  Psych: normal affect, calm  ~Rodrigue Baird D.O. -Resident

## 2018-11-05 NOTE — ED PROVIDER NOTE - MEDICAL DECISION MAKING DETAILS
Attending MD Pete: 62 yo female with PMH for CKD, DM type 2, HLD, HTN, obesity, DVT, presents with 2 weeks of right calf pain, worse with right leg weight bearing and shoots to foot.  On large amount of pain meds for spinal stenosis and not helping.  Had "vascular" scan by PMD and told has "vascular" disease of both legs.  Unable to walk to bathroom per usual.  Patient denies fever, chills, nausea, vomiting, or diarrhea. Patient denies chest pain, palpitations, SOB, or diaphoresis. On exam there mild swelling right LE and TTP to calf posteriorly (R>L).  No thigh TTP.  DDX:  DVT, OA, muscle strain.  Plan:  duplex, labs, coags. Pain control and re-eval. Attending MD Pete: 62 yo female with PMH for CKD, DM type 2, HLD, HTN, obesity, DVT, presents with 2 weeks of right calf pain, worse with right leg weight bearing and shoots to foot.  On large amount of pain meds for spinal stenosis and not helping.  Had "vascular" scan by PMD and told has "vascular" disease of both legs.  Unable to walk to bathroom per usual.  Patient denies fever, chills, nausea, vomiting, or diarrhea. Patient denies chest pain, palpitations, SOB, or diaphoresis. On exam there mild swelling right LE and TTP to calf posteriorly (R>L).  No thigh TTP.  DDX:  DVT, OA, muscle strain.  Plan:  duplex, labs, coags. Pain control and re-eval.    63f with RLE pain and swelling 2 weeks, differential dvt vs muscle strain vs fracture. less likely PE with no shortness of breath, cp. Afebrile without rash and erythema, low concern for infection. DVT doppler, xray RLE, labs, pain control reassess

## 2018-11-05 NOTE — ED PROVIDER NOTE - ATTENDING CONTRIBUTION TO CARE
Attending MD Pete:  I personally have seen and examined this patient.  Resident note reviewed and agree on plan of care and except where noted.  See MDM for details.

## 2018-11-05 NOTE — ED ADULT NURSE REASSESSMENT NOTE - NS ED NURSE REASSESS COMMENT FT1
received report from received report from Ermelinda Cardenas RN. pt is awaiting lab results, US results, and pain control from chronic back pain. will cont to monitor and provide assistance if needed.

## 2018-11-05 NOTE — ED PROVIDER NOTE - NS ED ROS FT
ROS:  GENERAL: No fever, no chills  HEENT: no trouble swallowing, no trouble speaking  CARDIAC: no chest pain  PULMONARY: no cough, no shortness of breath  GI: no abdominal pain, no nausea, no vomiting, no diarrhea, no constipation  : No dysuria, no frequency, no change in appearance, or odor of urine  SKIN: no rashes  NEURO: no headache, no weakness  MSK: right calf pain  ~Rodrigue Baird D.O. -Resident

## 2018-11-05 NOTE — ED ADULT TRIAGE NOTE - CHIEF COMPLAINT QUOTE
pt states right leg pain for few weeks unknown injury has seen her pmd had vascular test 10/19 b/l legs here pt morbidly obese uses walker cane state pain worse to right calf attemping to stand pt hx of spinal stenosis on pain meds oxycontin and vicoden es

## 2018-11-05 NOTE — ED ADULT NURSE NOTE - OBJECTIVE STATEMENT
Female 63 years old obese, with history of DM, Cervical/Spinal stenosis came in for right leg pain for 2 weeks not relieved with Vicodin and Oxycontin. Pt described pain "feels like a knot" 8/10 mostly at tight calf. No injury. Toes mobile and pedal pulse palpable. Rogelio chest pain, sob, N/V, fever and chills. Seen by MD. Comfort/safety maintained.

## 2018-11-05 NOTE — ED PROVIDER NOTE - PSH
C Section 1994    Cataract extracted with lens implant 1998  Right  Cervical Spinal Stenosis surgery x2 (01/2002, 7/2002)    Cholecystectomy/appendectomy @ age 25    D&C 2008  hysteroscopy, endometrial hyperplasia, 2009  D&C x2 1980's    Endometrial biopsy 12/02/09    H/O colonoscopy  1998  H/O laser iridotomy  left eye, 2016  Tonsillectomy as a child

## 2018-11-05 NOTE — ED PROVIDER NOTE - OBJECTIVE STATEMENT
63f pmh htn, dm, morbid obesity, ckd, uses wheelchair pw cc R leg pain for 2 weeks. patient reports feeling worsening "knot like and ripping pain" in right posterior calf, radiating to foot when ambulating for two weeks. Reports recently getting a vascular scan with Dr. Mccann her PCP outpatient and showed vascular disease in the legs. 63f pmh htn, dm, morbid obesity, ckd, dvt? uses wheelchair pw cc R leg pain for 2 weeks. patient reports feeling worsening "knot like and ripping pain" in right posterior calf, radiating to foot when ambulating for two weeks. Reports recently getting a vascular scan with Dr. Mccann her PCP outpatient and showed vascular disease in the legs. Reports using her regular medication for her chronic neck pain including her oxycotin 5 q12 and vicodin extra strength tid, without relief of her pain. reports feeling numbness and some swelling in RLE as well. denies sob, fever, chest pain, fevers, chills, rash, incontinence, pain radiating to back. Lives with twin sons and has a home health aid to help with ADLs. 63f pmh htn, dm, morbid obesity, ckd, dvt? uses wheelchair pw cc R leg pain for 2 weeks. patient reports feeling worsening "knot like and ripping pain" in right posterior calf, radiating to foot when ambulating for two weeks. Reports recently getting a vascular scan with Dr. Mccann her PCP outpatient and showed vascular disease in the legs. Reports using her regular medication for her chronic neck pain including her oxycotin 5 q12 and vicodin extra strength tid, without relief of her pain. reports feeling numbness and some swelling in RLE as well. denies sob, fever, chest pain, fevers, chills, rash, incontinence, pain radiating to back. Lives with twin sons and has a home health aid to help with ADLs.    Paula Mccann MD Phone: [phone]

## 2018-11-05 NOTE — ED ADULT NURSE REASSESSMENT NOTE - NSIMPLEMENTINTERV_GEN_ALL_ED
Implemented All Fall Risk Interventions:  [place] to call system. Call bell, personal items and telephone within reach. Instruct patient to call for assistance. Room bathroom lighting operational. Non-slip footwear when patient is off stretcher. Physically safe environment: no spills, clutter or unnecessary equipment. Stretcher in lowest position, wheels locked, appropriate side rails in place. Provide visual cue, wrist band, yellow gown, etc. Monitor gait and stability. Monitor for mental status changes and reorient to person, place, and time. Review medications for side effects contributing to fall risk. Reinforce activity limits and safety measures with patient and family.

## 2018-11-05 NOTE — ED ADULT NURSE NOTE - NSIMPLEMENTINTERV_GEN_ALL_ED
Implemented All Fall with Harm Risk Interventions:  [place] to call system. Call bell, personal items and telephone within reach. Instruct patient to call for assistance. Room bathroom lighting operational. Non-slip footwear when patient is off stretcher. Physically safe environment: no spills, clutter or unnecessary equipment. Stretcher in lowest position, wheels locked, appropriate side rails in place. Provide visual cue, wrist band, yellow gown, etc. Monitor gait and stability. Monitor for mental status changes and reorient to person, place, and time. Review medications for side effects contributing to fall risk. Reinforce activity limits and safety measures with patient and family. Provide visual clues: red socks.

## 2018-11-05 NOTE — ED ADULT NURSE NOTE - PMH
Cataract    Cervical Stenosis of Spine    CKD (chronic kidney disease)    Deep Vein Thrombosis (DVT)  Left leg, 2004, treated and resolved  Diabetes Mellitus Type II    Dyslipidemia    Edema of lower extremity  on lasix  Endometrial Hyperplasia    HTN (Hypertension)    Insomnia    Morbid Obesity    Narrow angle glaucoma of left eye    Other fecal abnormalities  +iFOBT  Spinal Stenosis, Lumbar    Vitamin D deficiency

## 2018-11-05 NOTE — ED ADULT NURSE NOTE - ED STAT RN HANDOFF DETAILS 2
Bedside report given to holding nurse Raquel SANCHEZ. Understands pmh, medications given and plan of care for patient. Patient in stable condition, vital signs updated, has no complaints at this time and has been updated on care plan. Explained to patient that new nurse is taking over, pt verbalized understanding.

## 2018-11-06 ENCOUNTER — APPOINTMENT (OUTPATIENT)
Dept: INTERNAL MEDICINE | Facility: CLINIC | Age: 63
End: 2018-11-06

## 2018-11-06 DIAGNOSIS — E78.5 HYPERLIPIDEMIA, UNSPECIFIED: ICD-10-CM

## 2018-11-06 DIAGNOSIS — G47.00 INSOMNIA, UNSPECIFIED: ICD-10-CM

## 2018-11-06 DIAGNOSIS — Z29.9 ENCOUNTER FOR PROPHYLACTIC MEASURES, UNSPECIFIED: ICD-10-CM

## 2018-11-06 DIAGNOSIS — R60.0 LOCALIZED EDEMA: ICD-10-CM

## 2018-11-06 DIAGNOSIS — E11.9 TYPE 2 DIABETES MELLITUS WITHOUT COMPLICATIONS: ICD-10-CM

## 2018-11-06 DIAGNOSIS — M79.604 PAIN IN RIGHT LEG: ICD-10-CM

## 2018-11-06 DIAGNOSIS — I10 ESSENTIAL (PRIMARY) HYPERTENSION: ICD-10-CM

## 2018-11-06 DIAGNOSIS — E55.9 VITAMIN D DEFICIENCY, UNSPECIFIED: ICD-10-CM

## 2018-11-06 DIAGNOSIS — M48.02 SPINAL STENOSIS, CERVICAL REGION: ICD-10-CM

## 2018-11-06 DIAGNOSIS — N18.9 CHRONIC KIDNEY DISEASE, UNSPECIFIED: ICD-10-CM

## 2018-11-06 LAB
BASOPHILS # BLD AUTO: 0.07 K/UL — SIGNIFICANT CHANGE UP (ref 0–0.2)
BASOPHILS NFR BLD AUTO: 0.7 % — SIGNIFICANT CHANGE UP (ref 0–2)
CRP SERPL-MCNC: 0.99 MG/DL — HIGH (ref 0–0.4)
EOSINOPHIL # BLD AUTO: 0.29 K/UL — SIGNIFICANT CHANGE UP (ref 0–0.5)
EOSINOPHIL NFR BLD AUTO: 2.8 % — SIGNIFICANT CHANGE UP (ref 0–6)
ERYTHROCYTE [SEDIMENTATION RATE] IN BLOOD: 22 MM/HR — HIGH (ref 0–20)
HCT VFR BLD CALC: 42.4 % — SIGNIFICANT CHANGE UP (ref 34.5–45)
HGB BLD-MCNC: 13.4 G/DL — SIGNIFICANT CHANGE UP (ref 11.5–15.5)
IMM GRANULOCYTES NFR BLD AUTO: 0.7 % — SIGNIFICANT CHANGE UP (ref 0–1.5)
LYMPHOCYTES # BLD AUTO: 2.8 K/UL — SIGNIFICANT CHANGE UP (ref 1–3.3)
LYMPHOCYTES # BLD AUTO: 26.6 % — SIGNIFICANT CHANGE UP (ref 13–44)
MCHC RBC-ENTMCNC: 28.9 PG — SIGNIFICANT CHANGE UP (ref 27–34)
MCHC RBC-ENTMCNC: 31.6 GM/DL — LOW (ref 32–36)
MCV RBC AUTO: 91.4 FL — SIGNIFICANT CHANGE UP (ref 80–100)
MONOCYTES # BLD AUTO: 0.8 K/UL — SIGNIFICANT CHANGE UP (ref 0–0.9)
MONOCYTES NFR BLD AUTO: 7.6 % — SIGNIFICANT CHANGE UP (ref 2–14)
NEUTROPHILS # BLD AUTO: 6.48 K/UL — SIGNIFICANT CHANGE UP (ref 1.8–7.4)
NEUTROPHILS NFR BLD AUTO: 61.6 % — SIGNIFICANT CHANGE UP (ref 43–77)
PLATELET # BLD AUTO: 240 K/UL — SIGNIFICANT CHANGE UP (ref 150–400)
RBC # BLD: 4.64 M/UL — SIGNIFICANT CHANGE UP (ref 3.8–5.2)
RBC # FLD: 15 % — HIGH (ref 10.3–14.5)
WBC # BLD: 10.51 K/UL — HIGH (ref 3.8–10.5)
WBC # FLD AUTO: 10.51 K/UL — HIGH (ref 3.8–10.5)

## 2018-11-06 PROCEDURE — 73723 MRI JOINT LWR EXTR W/O&W/DYE: CPT | Mod: 26,RT

## 2018-11-06 PROCEDURE — 12345: CPT | Mod: NC

## 2018-11-06 PROCEDURE — 99223 1ST HOSP IP/OBS HIGH 75: CPT | Mod: GC

## 2018-11-06 RX ORDER — INSULIN GLARGINE 100 [IU]/ML
20 INJECTION, SOLUTION SUBCUTANEOUS AT BEDTIME
Qty: 0 | Refills: 0 | Status: DISCONTINUED | OUTPATIENT
Start: 2018-11-06 | End: 2018-11-09

## 2018-11-06 RX ORDER — DEXTROSE 50 % IN WATER 50 %
15 SYRINGE (ML) INTRAVENOUS ONCE
Qty: 0 | Refills: 0 | Status: DISCONTINUED | OUTPATIENT
Start: 2018-11-06 | End: 2018-11-09

## 2018-11-06 RX ORDER — DEXTROSE 50 % IN WATER 50 %
12.5 SYRINGE (ML) INTRAVENOUS ONCE
Qty: 0 | Refills: 0 | Status: DISCONTINUED | OUTPATIENT
Start: 2018-11-06 | End: 2018-11-09

## 2018-11-06 RX ORDER — FUROSEMIDE 40 MG
20 TABLET ORAL DAILY
Qty: 0 | Refills: 0 | Status: DISCONTINUED | OUTPATIENT
Start: 2018-11-06 | End: 2018-11-09

## 2018-11-06 RX ORDER — GLUCAGON INJECTION, SOLUTION 0.5 MG/.1ML
1 INJECTION, SOLUTION SUBCUTANEOUS ONCE
Qty: 0 | Refills: 0 | Status: DISCONTINUED | OUTPATIENT
Start: 2018-11-06 | End: 2018-11-09

## 2018-11-06 RX ORDER — ACETAMINOPHEN 500 MG
650 TABLET ORAL EVERY 6 HOURS
Qty: 0 | Refills: 0 | Status: DISCONTINUED | OUTPATIENT
Start: 2018-11-06 | End: 2018-11-09

## 2018-11-06 RX ORDER — SODIUM CHLORIDE 9 MG/ML
1000 INJECTION, SOLUTION INTRAVENOUS
Qty: 0 | Refills: 0 | Status: DISCONTINUED | OUTPATIENT
Start: 2018-11-06 | End: 2018-11-09

## 2018-11-06 RX ORDER — HEPARIN SODIUM 5000 [USP'U]/ML
5000 INJECTION INTRAVENOUS; SUBCUTANEOUS EVERY 8 HOURS
Qty: 0 | Refills: 0 | Status: DISCONTINUED | OUTPATIENT
Start: 2018-11-06 | End: 2018-11-09

## 2018-11-06 RX ORDER — INSULIN LISPRO 100/ML
5 VIAL (ML) SUBCUTANEOUS
Qty: 0 | Refills: 0 | Status: DISCONTINUED | OUTPATIENT
Start: 2018-11-06 | End: 2018-11-08

## 2018-11-06 RX ORDER — HYDROMORPHONE HYDROCHLORIDE 2 MG/ML
1 INJECTION INTRAMUSCULAR; INTRAVENOUS; SUBCUTANEOUS ONCE
Qty: 0 | Refills: 0 | Status: DISCONTINUED | OUTPATIENT
Start: 2018-11-06 | End: 2018-11-06

## 2018-11-06 RX ORDER — DEXTROSE 50 % IN WATER 50 %
25 SYRINGE (ML) INTRAVENOUS ONCE
Qty: 0 | Refills: 0 | Status: DISCONTINUED | OUTPATIENT
Start: 2018-11-06 | End: 2018-11-09

## 2018-11-06 RX ORDER — ALBUTEROL 90 UG/1
2 AEROSOL, METERED ORAL EVERY 6 HOURS
Qty: 0 | Refills: 0 | Status: DISCONTINUED | OUTPATIENT
Start: 2018-11-06 | End: 2018-11-09

## 2018-11-06 RX ORDER — POLYETHYLENE GLYCOL 3350 17 G/17G
17 POWDER, FOR SOLUTION ORAL DAILY
Qty: 0 | Refills: 0 | Status: DISCONTINUED | OUTPATIENT
Start: 2018-11-06 | End: 2018-11-07

## 2018-11-06 RX ORDER — OXYCODONE HYDROCHLORIDE 5 MG/1
5 TABLET ORAL EVERY 4 HOURS
Qty: 0 | Refills: 0 | Status: DISCONTINUED | OUTPATIENT
Start: 2018-11-06 | End: 2018-11-09

## 2018-11-06 RX ORDER — LISINOPRIL 2.5 MG/1
5 TABLET ORAL DAILY
Qty: 0 | Refills: 0 | Status: DISCONTINUED | OUTPATIENT
Start: 2018-11-06 | End: 2018-11-09

## 2018-11-06 RX ORDER — ASPIRIN/CALCIUM CARB/MAGNESIUM 324 MG
81 TABLET ORAL DAILY
Qty: 0 | Refills: 0 | Status: DISCONTINUED | OUTPATIENT
Start: 2018-11-06 | End: 2018-11-09

## 2018-11-06 RX ORDER — CLONAZEPAM 1 MG
0.5 TABLET ORAL AT BEDTIME
Qty: 0 | Refills: 0 | Status: DISCONTINUED | OUTPATIENT
Start: 2018-11-06 | End: 2018-11-09

## 2018-11-06 RX ORDER — INFLUENZA VIRUS VACCINE 15; 15; 15; 15 UG/.5ML; UG/.5ML; UG/.5ML; UG/.5ML
0.5 SUSPENSION INTRAMUSCULAR ONCE
Qty: 0 | Refills: 0 | Status: COMPLETED | OUTPATIENT
Start: 2018-11-06 | End: 2018-11-09

## 2018-11-06 RX ORDER — INSULIN LISPRO 100/ML
VIAL (ML) SUBCUTANEOUS AT BEDTIME
Qty: 0 | Refills: 0 | Status: DISCONTINUED | OUTPATIENT
Start: 2018-11-06 | End: 2018-11-09

## 2018-11-06 RX ORDER — OXYCODONE HYDROCHLORIDE 5 MG/1
60 TABLET ORAL EVERY 12 HOURS
Qty: 0 | Refills: 0 | Status: DISCONTINUED | OUTPATIENT
Start: 2018-11-06 | End: 2018-11-09

## 2018-11-06 RX ORDER — SIMVASTATIN 20 MG/1
40 TABLET, FILM COATED ORAL AT BEDTIME
Qty: 0 | Refills: 0 | Status: DISCONTINUED | OUTPATIENT
Start: 2018-11-06 | End: 2018-11-09

## 2018-11-06 RX ORDER — INSULIN LISPRO 100/ML
VIAL (ML) SUBCUTANEOUS
Qty: 0 | Refills: 0 | Status: DISCONTINUED | OUTPATIENT
Start: 2018-11-06 | End: 2018-11-09

## 2018-11-06 RX ADMIN — HYDROMORPHONE HYDROCHLORIDE 1 MILLIGRAM(S): 2 INJECTION INTRAMUSCULAR; INTRAVENOUS; SUBCUTANEOUS at 03:05

## 2018-11-06 RX ADMIN — OXYCODONE HYDROCHLORIDE 5 MILLIGRAM(S): 5 TABLET ORAL at 08:15

## 2018-11-06 RX ADMIN — Medication 0.5 MILLIGRAM(S): at 21:16

## 2018-11-06 RX ADMIN — HYDROMORPHONE HYDROCHLORIDE 1 MILLIGRAM(S): 2 INJECTION INTRAMUSCULAR; INTRAVENOUS; SUBCUTANEOUS at 03:41

## 2018-11-06 RX ADMIN — OXYCODONE HYDROCHLORIDE 60 MILLIGRAM(S): 5 TABLET ORAL at 05:48

## 2018-11-06 RX ADMIN — Medication 81 MILLIGRAM(S): at 13:08

## 2018-11-06 RX ADMIN — SIMVASTATIN 40 MILLIGRAM(S): 20 TABLET, FILM COATED ORAL at 21:15

## 2018-11-06 RX ADMIN — OXYCODONE HYDROCHLORIDE 5 MILLIGRAM(S): 5 TABLET ORAL at 22:48

## 2018-11-06 RX ADMIN — HEPARIN SODIUM 5000 UNIT(S): 5000 INJECTION INTRAVENOUS; SUBCUTANEOUS at 05:48

## 2018-11-06 RX ADMIN — Medication 5 UNIT(S): at 18:21

## 2018-11-06 RX ADMIN — INSULIN GLARGINE 20 UNIT(S): 100 INJECTION, SOLUTION SUBCUTANEOUS at 22:38

## 2018-11-06 RX ADMIN — Medication 20 MILLIGRAM(S): at 05:48

## 2018-11-06 RX ADMIN — POLYETHYLENE GLYCOL 3350 17 GRAM(S): 17 POWDER, FOR SOLUTION ORAL at 13:08

## 2018-11-06 RX ADMIN — MORPHINE SULFATE 4 MILLIGRAM(S): 50 CAPSULE, EXTENDED RELEASE ORAL at 00:17

## 2018-11-06 RX ADMIN — OXYCODONE HYDROCHLORIDE 60 MILLIGRAM(S): 5 TABLET ORAL at 17:53

## 2018-11-06 RX ADMIN — OXYCODONE HYDROCHLORIDE 60 MILLIGRAM(S): 5 TABLET ORAL at 18:23

## 2018-11-06 RX ADMIN — OXYCODONE HYDROCHLORIDE 5 MILLIGRAM(S): 5 TABLET ORAL at 22:59

## 2018-11-06 RX ADMIN — LISINOPRIL 5 MILLIGRAM(S): 2.5 TABLET ORAL at 05:48

## 2018-11-06 RX ADMIN — OXYCODONE HYDROCHLORIDE 5 MILLIGRAM(S): 5 TABLET ORAL at 13:38

## 2018-11-06 RX ADMIN — OXYCODONE HYDROCHLORIDE 5 MILLIGRAM(S): 5 TABLET ORAL at 13:08

## 2018-11-06 RX ADMIN — HEPARIN SODIUM 5000 UNIT(S): 5000 INJECTION INTRAVENOUS; SUBCUTANEOUS at 13:12

## 2018-11-06 RX ADMIN — OXYCODONE HYDROCHLORIDE 5 MILLIGRAM(S): 5 TABLET ORAL at 07:44

## 2018-11-06 RX ADMIN — OXYCODONE HYDROCHLORIDE 60 MILLIGRAM(S): 5 TABLET ORAL at 06:18

## 2018-11-06 RX ADMIN — HEPARIN SODIUM 5000 UNIT(S): 5000 INJECTION INTRAVENOUS; SUBCUTANEOUS at 21:15

## 2018-11-06 RX ADMIN — MORPHINE SULFATE 4 MILLIGRAM(S): 50 CAPSULE, EXTENDED RELEASE ORAL at 00:30

## 2018-11-06 NOTE — H&P ADULT - PROBLEM SELECTOR PLAN 3
On home 74U and 70U qhs Humulin and Novolog 5U premeals, 7.3% on 6/2018  -ISS and qhs for now since hypoglycemic on FS  -can restart tomorrow if FS is elevated again Cervical and Lumbar Stenosis of the spine for years and is dependent on her wheelchair and walker  Pain control w/ oxycontin 60 mg tablet BID and tylenol PRN, ISTOP: [id number]  Can add hydrocodone 7.5mg TID PRN if pain still not controlled

## 2018-11-06 NOTE — H&P ADULT - HISTORY OF PRESENT ILLNESS
AGUEDA LUIS  Patient is a 63y Female with a PMH of IDDM, HTN, CKD, morbid obesity, DVT (2004), HLD, cervical and lumbar stenosis p/w R leg pain for the last 2 weeks. It was difficult for her to ambulate and has been using a wheelchair in order to get around. She reports that in the posterior calf and radiates to her feet. She had gotten workup outpt and was shown to have PAD in the legs and denies any recent infections, fevers, nausea, vomiting, diarrhea.     In the ED Vitals signs were:  T(C): 36.7 (11-05-18 @ 23:53), Max: 36.7 (11-05-18 @ 19:20)  HR: 74 (11-05-18 @ 23:53) (72 - 108)  BP: 118/69 (11-05-18 @ 23:53) (118/69 - 143/63)  RR: 20 (11-05-18 @ 23:53) (20 - 20)  SpO2: 94% (11-05-18 @ 23:53) (94% - 98%)    Labs were                        14.8   11.6  )-----------( 239      ( 05 Nov 2018 19:21 )             46.1     143  |  108  |  27<H>  ----------------------------<  72  4.9   |  21<L>  |  1.53<H>  Ca    96      05 Nov 2018 22:31      PT/INR - ( 05 Nov 2018 19:21 )   PT: 10.8 sec;   INR: 0.95 ratio    PTT - ( 05 Nov 2018 19:21 )  PTT:29.0 sec    CAPILLARY BLOOD GLUCOSE  POCT Blood Glucose.: 63 mg/dL (06 Nov 2018 00:14)  POCT Blood Glucose.: 49 mg/dL (05 Nov 2018 22:25)    Imaging showed:    Given in th ED: Patient is a 63y Female with a PMH of IDDM, HTN, CKD, morbid obesity, DVT (2004), HLD, cervical and lumbar stenosis p/w R leg pain for the last 2 weeks. It was difficult for her to ambulate and has been using a wheelchair in order to get around. She reports that in the posterior calf and radiates to her feet. She had gotten workup outpt and was shown to have PAD in the legs and denies any recent infections, fevers, nausea, vomiting, diarrhea.     In the ED Vitals signs were:  T(C): 36.7 (11-05-18 @ 23:53), Max: 36.7 (11-05-18 @ 19:20)  HR: 74 (11-05-18 @ 23:53) (72 - 108)  BP: 118/69 (11-05-18 @ 23:53) (118/69 - 143/63)  RR: 20 (11-05-18 @ 23:53) (20 - 20)  SpO2: 94% (11-05-18 @ 23:53) (94% - 98%)    Labs were                        14.8   11.6  )-----------( 239      ( 05 Nov 2018 19:21 )             46.1     143  |  108  |  27<H>  ----------------------------<  72  4.9   |  21<L>  |  1.53<H>  Ca    96      05 Nov 2018 22:31      PT/INR - ( 05 Nov 2018 19:21 )   PT: 10.8 sec;   INR: 0.95 ratio    PTT - ( 05 Nov 2018 19:21 )  PTT:29.0 sec    CAPILLARY BLOOD GLUCOSE  POCT Blood Glucose.: 63 mg/dL (06 Nov 2018 00:14)  POCT Blood Glucose.: 49 mg/dL (05 Nov 2018 22:25)    Imaging showed:    Given in th ED: Patient is a 63y Female with a PMH of IDDM, HTN, CKD, morbid obesity, L DVT (2006), HLD, cervical and lumbar stenosis p/w R leg pain for the last 2 weeks. It has been worsening to the point that she isn't able to ambulate with her walker anymore and has has been using a wheelchair in order to get around. She reports that in the posterior calf, increased warmth and pain radiates to her feet and that she can't put any pressure on her foot. She has had 4 prior fractures on her R foot but last fracture was 5 years ago and no surgeries on it. She had gotten workup outpt and was shown to have small vessel disease in her feet but no doppler abnormalitieis of low, VINOD 1.26 (R) and 1.22 (L) in the legs and noncompressible She  denies any recent infections, fevers, nausea, vomiting, diarrhea, CP, SOB.     In the ED Vitals signs were:  T(C): 36.7 (11-05-18 @ 23:53), Max: 36.7 (11-05-18 @ 19:20)  HR: 74 (11-05-18 @ 23:53) (72 - 108)  BP: 118/69 (11-05-18 @ 23:53) (118/69 - 143/63)  RR: 20 (11-05-18 @ 23:53) (20 - 20)  SpO2: 94% (11-05-18 @ 23:53) (94% - 98%)    Labs were                        14.8   11.6  )-----------( 239      ( 05 Nov 2018 19:21 )             46.1     143  |  108  |  27<H>  ----------------------------<  72  4.9   |  21<L>  |  1.53<H>  Ca    96      05 Nov 2018 22:31      PT/INR - ( 05 Nov 2018 19:21 )   PT: 10.8 sec;   INR: 0.95 ratio    PTT - ( 05 Nov 2018 19:21 )  PTT:29.0 sec    CAPILLARY BLOOD GLUCOSE  POCT Blood Glucose.: 63 mg/dL (06 Nov 2018 00:14)  POCT Blood Glucose.: 49 mg/dL (05 Nov 2018 22:25)    Imaging showed:   < from: [facility] Duplex Lower Ext Vein Scan, Right (11.05.18 @ 21:33) >  No evidence of right lower extremity deep venous thrombosis.    < from: Xray Tibia + Fibula 2 Views, Right (11.05.18 @ 19:48) >  uestionable cortical erosion and periosteal new bone   formation at the partially imaged subtalar joint. MRI with IV contrats of   the hindfoot can be performed if clinically indicated.    EKG wnl  Given in th ED: IV Dilaudid 1mg x1, morphine 5mg x1 , oxycodone 5mg x1 Patient is a 63y Female with a PMH of IDDM, HTN, CKD, morbid obesity, provoked L DVT (2006), HLD, cervical and lumbar stenosis p/w R leg pain for the last 2 weeks. It has been worsening to the point that she isn't able to ambulate with her walker anymore and has has been using a wheelchair in order to get around. She reports that in the posterior calf, increased warmth and pain radiates to her feet and that she can't put any pressure on her foot. She has had 4 prior fractures on her R foot but last fracture was 5 years ago and no surgeries on it. Has noticed gradual skin changes bilaterally on her lower extremities. She had gotten workup outpt and was shown to have small vessel disease in her feet but no doppler abnormalities is of low, VINOD 1.26 (R) and 1.22 (L) in the legs and noncompressible She  denies any recent infections, fevers, nausea, vomiting, diarrhea, CP, SOB and      In the ED Vitals signs were:  T(C): 36.7 (11-05-18 @ 23:53), Max: 36.7 (11-05-18 @ 19:20)  HR: 74 (11-05-18 @ 23:53) (72 - 108)  BP: 118/69 (11-05-18 @ 23:53) (118/69 - 143/63)  RR: 20 (11-05-18 @ 23:53) (20 - 20)  SpO2: 94% (11-05-18 @ 23:53) (94% - 98%)    Labs were             14.8   11.6  )-----------( 239      ( 05 Nov 2018 19:21 )             46.1     143  |  108  |  27<H>  ----------------------------<  72  4.9   |  21<L>  |  1.53<H>  Ca    96       PT/INR -  PT: 10.8 sec;   INR: 0.95 ratio    PTT -   PTT:29.0 sec    CAPILLARY BLOOD GLUCOSE  POCT Blood Glucose.: 63 mg/dL (06 Nov 2018 00:14)  POCT Blood Glucose.: 49 mg/dL (05 Nov 2018 22:25)    Imaging showed:   < from: [facility] Duplex Lower Ext Vein Scan, Right (11.05.18 @ 21:33) >  No evidence of right lower extremity deep venous thrombosis.    < from: Xray Tibia + Fibula 2 Views, Right (11.05.18 @ 19:48) >  uestionable cortical erosion and periosteal new bone   formation at the partially imaged subtalar joint. MRI with IV contrats of   the hindfoot can be performed if clinically indicated.    EKG wnl  Given in th ED: IV Dilaudid 1mg x1, morphine 5mg x1 , oxycodone 5mg x1 Patient is a 63y Female with a PMH of IDDM (A1c 8.2% on 8/2018), HTN, CKD, morbid obesity, provoked L DVT (2006), HLD, cervical and lumbar stenosis p/w R leg pain for the last 2 weeks. It has been worsening to the point that she isn't able to ambulate with her walker anymore and has has been using a wheelchair in order to get around. She reports that in the posterior calf, increased warmth and pain radiates to her feet and that she can't put any pressure on her foot. She has had 4 prior fractures on her R foot but last fracture was 5 years ago and no surgeries on it. Has noticed gradual skin changes bilaterally on her lower extremities. She had gotten workup outpt and was shown to have small vessel disease in her feet but no doppler abnormalities is of low, VINOD 1.26 (R) and 1.22 (L) in the legs and noncompressible She  denies any recent infections, fevers, nausea, vomiting, diarrhea, CP, SOB and      In the ED Vitals signs were:  T(C): 36.7 (11-05-18 @ 23:53), Max: 36.7 (11-05-18 @ 19:20)  HR: 74 (11-05-18 @ 23:53) (72 - 108)  BP: 118/69 (11-05-18 @ 23:53) (118/69 - 143/63)  RR: 20 (11-05-18 @ 23:53) (20 - 20)  SpO2: 94% (11-05-18 @ 23:53) (94% - 98%)    Labs were             14.8   11.6  )-----------( 239      ( 05 Nov 2018 19:21 )             46.1     143  |  108  |  27<H>  ----------------------------<  72  4.9   |  21<L>  |  1.53<H>  Ca    96       PT/INR -  PT: 10.8 sec;   INR: 0.95 ratio    PTT -   PTT:29.0 sec    CAPILLARY BLOOD GLUCOSE  POCT Blood Glucose.: 63 mg/dL (06 Nov 2018 00:14)  POCT Blood Glucose.: 49 mg/dL (05 Nov 2018 22:25)    Imaging showed:   < from: [facility] Duplex Lower Ext Vein Scan, Right (11.05.18 @ 21:33) >  No evidence of right lower extremity deep venous thrombosis.    < from: Xray Tibia + Fibula 2 Views, Right (11.05.18 @ 19:48) >  uestionable cortical erosion and periosteal new bone   formation at the partially imaged subtalar joint. MRI with IV contrats of   the hindfoot can be performed if clinically indicated.    EKG wnl  Given in th ED: IV Dilaudid 1mg x1, morphine 5mg x1 , oxycodone 5mg x1 Patient is a 63y Female with a PMH of IDDM (A1c 8.2% on 8/2018), HTN, CKD, morbid obesity, provoked L DVT (2006), HLD, cervical and lumbar stenosis p/w R leg pain for the last 2 weeks. It has been worsening to the point that she isn't able to ambulate with her walker anymore and has has been using a wheelchair in order to get around. She reports that in the posterior calf, increased warmth and pain radiates to her feet and that she can't put any pressure on her foot. She has had 4 prior fractures on her R foot but last fracture was 5 years ago and no surgeries on it. Has noticed gradual skin changes bilaterally on her lower extremities. She had gotten workup outpt and was shown to have small vessel disease in her feet but no doppler abnormalities is of low, VINOD 1.26 (R) and 1.22 (L) in the legs and noncompressible. She denies any recent infections, fevers, nausea, vomiting, diarrhea, CP, SOB.    In the ED Vitals signs were:  T(C): 36.7 (11-05-18 @ 23:53), Max: 36.7 (11-05-18 @ 19:20)  HR: 74 (11-05-18 @ 23:53) (72 - 108)  BP: 118/69 (11-05-18 @ 23:53) (118/69 - 143/63)  RR: 20 (11-05-18 @ 23:53) (20 - 20)  SpO2: 94% (11-05-18 @ 23:53) (94% - 98%)  Given in th ED: IV Dilaudid 1mg x1, morphine 5mg x1 , oxycodone 5mg x1

## 2018-11-06 NOTE — CHART NOTE - NSCHARTNOTEFT_GEN_A_CORE
Patient was seen and examined by me at bedside. I agree with nocturnists  note, subjective, objective physical exam, assessment and plan with following modifications/additions.    Right lower extremity pain - concern for possible Osteomyelitis patient s/p MRI with contrast will await results before starting abx.  Not consistent with Charcot arthropathy as WBC elevated along with Acute phase reactants.  foot not warm and no increased edema.      Istopped by nocturnists- will continue current pain regimen    Insulin dependent diabetes mellitus- Discussed with patient will continue pre meal insulin and start Lantus 20 QPM  will not continue humulin

## 2018-11-06 NOTE — H&P ADULT - PROBLEM SELECTOR PLAN 1
R leg pain most likely due to possibly OM vs cellulitis vs PAD R leg pain most likely due to possibly OM vs cellulitis vs PAD  -f/u MRI w/ contrast to r/o OM   -tx w/ empiric vanco and zosyn given extensive comorbidities and worsening pain  -c/w same home pain control w/ oxycontin 60 mg tablet BID and tylenol PRN, ISTOP: [id number]  -c/w miralax and senna bowel regimen R leg pain most likely due to possibly OM vs cellulitis vs PAD  -f/u MRI w/ contrast to r/o OM   -hold off tx w/ empiric vanco and zosyn but consider given extensive comorbidities and if the pt spikes a fever and has increased leukocytosis, ESR and CRP  -c/w same home pain control w/ oxycontin 60 mg tablet BID and tylenol PRN and oxycodone 5mg PRN ISTOP: [id number]  -c/w miralax and senna bowel regimen R leg pain most likely due to possibly OM vs cellulitis vs PAD  -f/u MRI w/ contrast to r/o OM   -hold off tx w/ empiric vanco and zosyn but consider given extensive comorbidities and if the pt spikes a fever and has increased leukocytosis, ESR and CRP  -c/w same home pain control w/ oxycontin 60 mg tablet BID and tylenol PRN and oxycodone 5mg PRN. For severe or breakthrough pain, consider use of morphine IV  ISTOP: [id number]  -c/w miralax and senna bowel regimen

## 2018-11-06 NOTE — H&P ADULT - NSHPPHYSICALEXAM_GEN_ALL_CORE
General: NAD, well nourished, appears stated age  HEENT:  Head: NT, AT  Eyes: EOMI, scleara non-icteric, conjunctiva clear, PERRLA, visual fields full to confrontration   Ears: hearing intact b/l  Nose: no discharge, obstruction, non-deviated  Mouth: no exudates, injected in pharynx, uvula midlines   Neck: Supple, No JVD, no carotid bruits no lymphadenopathy, no thyroidomegaly  Cardiac: RRR, S1 S2, No M/R/G, PMI in 5th IC  Pulmonary: CTA b/l, Breathing unlabored, No Rhonchi/Rales/Wheezing, diaphragm moves symmetrically b/l  Abdomen: Soft, Non -tender, +BS x 4 quads, no hepatomegaly or splenomegaly  Back: straight, no step-offs, no kyphosis, no CVA  Extremities: Warm, nontender, No Rashes, lesions, bruises, No pitting edema, venous stasis  Neuro: AO x 3, No focal deficits, CNII-CNXII grossly intact, Motor: strength 5/5 b/l UE and LE. Sensory: touch and pinprick sensation intact b/l. Cerebellar: no dysmetria, no tremor  Reflexes: 2+ b/l in LE and UE, no Babinski sign b/l General: Morbidly obese  female   HEENT:  Head: NT, AT  Eyes: scleara non-icteric  Cardiac: RRR, S1 S2, No M/R/G  Pulmonary: CTA b/l, Breathing unlabored,  Abdomen: Soft, Non -tender, +BS x 4 quads, no hepatomegaly or splenomegaly  Back: straight, no step-offs, no kyphosis, no CVA  Extremities: Slightly Warmer on the R compared to the L, b/l LE venous stasis changes but no ulcers seen, erythema noted bilaterally, erythema seen on feet itself  Neuro: AO x 3, No focal deficits, CNII-CNXII grossly intact. Motor: strength 5/5 b/l UE and LE. Sensory: touch and pinprick sensation intact b/l. Vital Signs Last 24 Hrs  T(C): 36.7 (06 Nov 2018 03:55), Max: 36.8 (06 Nov 2018 03:07)  T(F): 98.1 (06 Nov 2018 03:55), Max: 98.2 (06 Nov 2018 03:07)  HR: 69 (06 Nov 2018 03:55) (69 - 108)  BP: 126/57 (06 Nov 2018 03:55) (106/55 - 143/63)  BP(mean): --  RR: 20 (06 Nov 2018 03:55) (20 - 20)  SpO2: 93% (06 Nov 2018 03:55) (93% - 98%)    General: NAD, laying in bed. Morbidly obese  female   HEENT:  Head: NT, AT  Eyes: scleara non-icteric  Cardiac: RRR, S1 S2, No M/R/G  Pulmonary: CTA b/l, Breathing unlabored  Abdomen: Soft, Non -tender, +BS x 4 quads, no hepatomegaly or splenomegaly  Back: straight, no step-offs, no kyphosis, no CVA  Extremities: Slightly Warmer on the R compared to the L, b/l LE venous stasis changes but no ulcers seen, erythema noted bilaterally, erythema seen on feet itself  Neuro: AO x 3, No focal deficits, CNII-CNXII grossly intact. Motor: strength 5/5 b/l UE and LE. Sensory: touch and pinprick sensation intact b/l.

## 2018-11-06 NOTE — H&P ADULT - PROBLEM SELECTOR PLAN 2
Cervical and Lumbar Stenosis of the spine for years and is dependent on her wheelchair and walker  Pain control w/ oxycontin 60 mg tablet BID and tylenol PRN, ISTOP: [id number]  Can add hydrocodone 7.5mg TID PRN if pain still not controlled b/l leg edema, most likely due to body habitus, no prior CHF history but is on home lasix  -c/w lasix 20mg qd for b/l LE edema but will hold if sBP <100 and renal fxn worsens b/l leg edema, most likely due to body habitus, no prior CHF history but is on home lasix  -c/w lasix 20mg qd for b/l LE edema but will hold if SBP <100 and renal fxn worsens

## 2018-11-06 NOTE — H&P ADULT - PROBLEM SELECTOR PLAN 4
On home 74U and 70U qhs Humulin and Novolog 5U premeals, 7.3% on 6/2018  -ISS and qhs for now since hypoglycemic on FS  -can restart tomorrow if FS is elevated again On home 74U and 70U qhs Humulin and Novolog 5U premeals, 8.2% on 8/2018  -c/w premeals Humalog 5U premeals and consider endo consult for glycemic control and brittle DM, holding off Humulin doses for now given hypoglycemia overnight  -moderate ISS and moderate qhs for now since hypoglycemic on FS  -can restart regimen tomorrow if FS is elevated again On home 74U and 70U qhs Humulin and Novolog 5U premeals, 8.2% on 8/2018  -c/w premeals Humalog 5U premeals and consider endo consult for glycemic control and brittle DM, holding off Humulin/Basal doses for now given hypoglycemia overnight  -moderate ISS and moderate qhs for now since hypoglycemic on FS  -can restart regimen tomorrow if FS is elevated again

## 2018-11-06 NOTE — H&P ADULT - NSHPREVIEWOFSYSTEMS_GEN_ALL_CORE
REVIEW OF SYSTEMS:    CONSTITUTIONAL: No weakness, fatigue, malaise, fevers or chills, no weight change, appetite change  EYES: No visual changes; No double vision,  No vertigo, eye pain  Ears: no otalgia, no otorhea, no hearing loss, tinnitus  Nose: no epistaxis, rhinorrhea, post-discharge, sinus pressure  Throat: no throat pain, no oral lesions, tooth pain   NECK: No pain or stiffness  RESPIRATORY: No cough (productive or dry), wheezing, hemoptysis; No shortness of breath, orthnopnea, PND, LEGER, snoring,  CARDIOVASCULAR: No chest pain or palpitations, no leg edema, no claudication    GASTROINTESTINAL: No abdominal or epigastric pain. No nausea, vomiting, or hematemesis; No diarrhea or constipation. No melena or hematochezia.  GENITOURINARY: No dysuria, frequency, urgency or hematuria, no pelvic pain, urinary incontinence, urgency  Muscloskeletal: no joints or muscle pain, no swelling in joints or muscles  NEUROLOGICAL: No numbness or weakness, headache, memory loss, seizures, dizziness, vertigo, syncope, ataxia  SKIN: No pruritis, rashes, lesions or new moles  Psych: No anxiety, sadness, insomnia, suicide thoughts  Endocrine: No Heat or Cold intolerance, polydipsia, polyphagia  Heme/Lymph: no LN enlargement, no easy bruising or bleeding REVIEW OF SYSTEMS:    CONSTITUTIONAL: +weakness, neg fevers or chills, no weight change  RESPIRATORY: No cough, wheezing, hemoptysis; No SOB, LEGER  CARDIOVASCULAR: No chest pain or palpitations, no leg edema, no claudication    GASTROINTESTINAL: No abdominal or epigastric pain. No nausea, vomiting, or hematemesis; No diarrhea or constipation  GENITOURINARY: No dysuria  Muscloskeletal: no joints or muscle pain, no swelling in joints or muscles  NEUROLOGICAL: No numbness or weakness, no HA  SKIN: +b/l skin changes with increased redness  Heme/Lymph: no easy bruising or bleeding REVIEW OF SYSTEMS:    CONSTITUTIONAL: +weakness, neg fevers or chills, no weight change  ENT: no sinus congestion, rhinorrhea, sore throat  RESPIRATORY: No cough, wheezing, hemoptysis; No SOB, LEGER  CARDIOVASCULAR: No chest pain or palpitations, no leg edema, no claudication    GASTROINTESTINAL: No abdominal or epigastric pain. No nausea, vomiting, or hematemesis; No diarrhea. +Constipation  GENITOURINARY: No dysuria  MUSCULOSKELETALl: no joints or muscle pain, no swelling in joints or muscles  NEUROLOGICAL:  SEE HPI No numbness or weakness, no HA.   SKIN: +b/l LE skin changes with increased redness over the course of months  HEME/LYMPH: no easy bruising or bleeding

## 2018-11-06 NOTE — H&P ADULT - NSHPSOCIALHISTORY_GEN_ALL_CORE
No smoking, alcohol or IVDU and lives at home with her family No smoking, alcohol or IVDU and lives at home with her family and walks with a walker and uses a wheelchair, has HHA for 4 hrs

## 2018-11-06 NOTE — H&P ADULT - ATTENDING COMMENTS
63y Female with a PMH of IDDM (A1c 8.2% on 8/2018), HTN, CKD, morbid obesity, provoked L DVT (2006), HLD, cervical and lumbar stenosis p/w R leg pain for the last 2 weeks. Patient denies any trauma to the right leg. Denies new skin manifestations, possibly new warmth. Denies fevers, chills. No worsening of LE edema. When bearing weight, right leg pain is most prominent around the calf. Pain 10/10 and once not weight bearing takes ~an hour or two to subside. Patient states that she a few years ago: had a fracture of the right foot that was spontaneous upon bearing weight on foot. Patient states she has not been diagnosed with osteoporosis. Has a hx of Vitamin D deficiency for which she take supplementation. Confirmed and reviewed remaining HPI and ROS as above.  Vitals reviewed and physically examined patient at bedside. Pertinent findings of exam: B/L venous stasis changes to LE with light pink erythema, no appreciable open wounds/ulcers. Temperature of legs are equal bilaterally.   Labs, imaging, and EKG personally reviewed.  Right leg pain: Bony erosion with periosteal bone formation on Xray of right tib/fib. Etiology includes but not limited to undiagnosed rheum condition vs osteomyelitis vs metabolic bone disorder  Obtain MRI. Will hold on abx as patient has been afebrile and no appreciable nidus. If febrile intiate abx  Remainder of plan as detailed above.  Patient previously unknown to me and I was assigned to precept this case with housestaff resident, Dr. Coker. Primary day team to assume care in AM.

## 2018-11-06 NOTE — H&P ADULT - PROBLEM SELECTOR PLAN 5
Baseline Cr 1.4-1.6, currently at baseline, CKD most likely due to DM and HTN  -monitor Cr qd  -renally dose meds and avoid nephrotoxins

## 2018-11-06 NOTE — H&P ADULT - FAMILY HISTORY
Father  Still living? Unknown  Family history of pancreatic cancer, Age at diagnosis: Age Unknown  Family history of bladder cancer, Age at diagnosis: Age Unknown     Grandparent  Still living? Unknown  Family history of lung cancer, Age at diagnosis: Age Unknown

## 2018-11-06 NOTE — H&P ADULT - ASSESSMENT
63y Female with a PMH of IDDM, HTN, CKD, morbid obesity, L DVT (2006), HLD, cervical and lumbar stenosis p/w R leg pain for the last 2 weeks 63y Female with a PMH of IDDM, HTN, CKD, morbid obesity, L DVT (2006), HLD, cervical and lumbar stenosis p/w R leg pain for the last 2 weeks possibly due to OM vs cellulitis vs PAD

## 2018-11-06 NOTE — H&P ADULT - NSHPLABSRESULTS_GEN_ALL_CORE
LABS:                        14.8   11.6  )-----------( 239      ( 05 Nov 2018 19:21 )             46.1     11-05    143  |  108  |  27<H>  ----------------------------<  72  4.9   |  21<L>  |  1.53<H>    Ca    9.6      05 Nov 2018 22:31      PT/INR - ( 05 Nov 2018 19:21 )   PT: 10.8 sec;   INR: 0.95 ratio         PTT - ( 05 Nov 2018 19:21 )  PTT:29.0 sec          RADIOLOGY & ADDITIONAL TESTS:    Imaging Personally Reviewed:  Yes Personally reviewed labs and noted in detail below  LABS:                        14.8   11.6  )-----------( 239      ( 05 Nov 2018 19:21 )             46.1     11-05    143  |  108  |  27<H>  ----------------------------<  72  4.9   |  21<L>  |  1.53<H>    Ca    9.6      05 Nov 2018 22:31      PT/INR - ( 05 Nov 2018 19:21 )   PT: 10.8 sec;   INR: 0.95 ratio         PTT - ( 05 Nov 2018 19:21 )  PTT:29.0 sec      RADIOLOGY & ADDITIONAL TESTS:    Imaging Personally Reviewed:  Yes    Imaging showed:   < from: VA Duplex Lower Ext Vein Scan, Right (11.05.18 @ 21:33) >  No evidence of right lower extremity deep venous thrombosis.    < from: Xray Tibia + Fibula 2 Views, Right (11.05.18 @ 19:48) >  uestionable cortical erosion and periosteal new bone   formation at the partially imaged subtalar joint. MRI with IV contrats of   the hindfoot can be performed if clinically indicated.    Personally reviewed EKG: SR

## 2018-11-07 DIAGNOSIS — K59.00 CONSTIPATION, UNSPECIFIED: ICD-10-CM

## 2018-11-07 LAB
ANION GAP SERPL CALC-SCNC: 11 MMOL/L — SIGNIFICANT CHANGE UP (ref 5–17)
BUN SERPL-MCNC: 27 MG/DL — HIGH (ref 7–23)
CALCIUM SERPL-MCNC: 9.4 MG/DL — SIGNIFICANT CHANGE UP (ref 8.4–10.5)
CHLORIDE SERPL-SCNC: 105 MMOL/L — SIGNIFICANT CHANGE UP (ref 96–108)
CO2 SERPL-SCNC: 28 MMOL/L — SIGNIFICANT CHANGE UP (ref 22–31)
CREAT SERPL-MCNC: 1.78 MG/DL — HIGH (ref 0.5–1.3)
GLUCOSE BLDC GLUCOMTR-MCNC: 163 MG/DL — HIGH (ref 70–99)
GLUCOSE BLDC GLUCOMTR-MCNC: 226 MG/DL — HIGH (ref 70–99)
GLUCOSE SERPL-MCNC: 130 MG/DL — HIGH (ref 70–99)
HBA1C BLD-MCNC: 7.2 % — HIGH (ref 4–5.6)
HCT VFR BLD CALC: 43.5 % — SIGNIFICANT CHANGE UP (ref 34.5–45)
HGB BLD-MCNC: 13.7 G/DL — SIGNIFICANT CHANGE UP (ref 11.5–15.5)
MCHC RBC-ENTMCNC: 29.9 PG — SIGNIFICANT CHANGE UP (ref 27–34)
MCHC RBC-ENTMCNC: 31.5 GM/DL — LOW (ref 32–36)
MCV RBC AUTO: 95 FL — SIGNIFICANT CHANGE UP (ref 80–100)
PLATELET # BLD AUTO: 239 K/UL — SIGNIFICANT CHANGE UP (ref 150–400)
POTASSIUM SERPL-MCNC: 4.9 MMOL/L — SIGNIFICANT CHANGE UP (ref 3.5–5.3)
POTASSIUM SERPL-SCNC: 4.9 MMOL/L — SIGNIFICANT CHANGE UP (ref 3.5–5.3)
RBC # BLD: 4.58 M/UL — SIGNIFICANT CHANGE UP (ref 3.8–5.2)
RBC # FLD: 14.7 % — HIGH (ref 10.3–14.5)
SODIUM SERPL-SCNC: 144 MMOL/L — SIGNIFICANT CHANGE UP (ref 135–145)
WBC # BLD: 9.73 K/UL — SIGNIFICANT CHANGE UP (ref 3.8–10.5)
WBC # FLD AUTO: 9.73 K/UL — SIGNIFICANT CHANGE UP (ref 3.8–10.5)

## 2018-11-07 PROCEDURE — 99222 1ST HOSP IP/OBS MODERATE 55: CPT

## 2018-11-07 PROCEDURE — 99233 SBSQ HOSP IP/OBS HIGH 50: CPT

## 2018-11-07 PROCEDURE — 73620 X-RAY EXAM OF FOOT: CPT | Mod: 26,50

## 2018-11-07 RX ORDER — IPRATROPIUM/ALBUTEROL SULFATE 18-103MCG
3 AEROSOL WITH ADAPTER (GRAM) INHALATION EVERY 6 HOURS
Qty: 0 | Refills: 0 | Status: DISCONTINUED | OUTPATIENT
Start: 2018-11-07 | End: 2018-11-09

## 2018-11-07 RX ORDER — POLYETHYLENE GLYCOL 3350 17 G/17G
17 POWDER, FOR SOLUTION ORAL
Qty: 0 | Refills: 0 | Status: DISCONTINUED | OUTPATIENT
Start: 2018-11-07 | End: 2018-11-09

## 2018-11-07 RX ADMIN — HEPARIN SODIUM 5000 UNIT(S): 5000 INJECTION INTRAVENOUS; SUBCUTANEOUS at 06:04

## 2018-11-07 RX ADMIN — Medication 0.5 MILLIGRAM(S): at 21:20

## 2018-11-07 RX ADMIN — Medication 5 UNIT(S): at 09:14

## 2018-11-07 RX ADMIN — HEPARIN SODIUM 5000 UNIT(S): 5000 INJECTION INTRAVENOUS; SUBCUTANEOUS at 21:20

## 2018-11-07 RX ADMIN — OXYCODONE HYDROCHLORIDE 5 MILLIGRAM(S): 5 TABLET ORAL at 16:35

## 2018-11-07 RX ADMIN — HEPARIN SODIUM 5000 UNIT(S): 5000 INJECTION INTRAVENOUS; SUBCUTANEOUS at 13:39

## 2018-11-07 RX ADMIN — ALBUTEROL 2 PUFF(S): 90 AEROSOL, METERED ORAL at 06:03

## 2018-11-07 RX ADMIN — Medication 81 MILLIGRAM(S): at 12:02

## 2018-11-07 RX ADMIN — OXYCODONE HYDROCHLORIDE 5 MILLIGRAM(S): 5 TABLET ORAL at 10:05

## 2018-11-07 RX ADMIN — OXYCODONE HYDROCHLORIDE 60 MILLIGRAM(S): 5 TABLET ORAL at 18:18

## 2018-11-07 RX ADMIN — OXYCODONE HYDROCHLORIDE 5 MILLIGRAM(S): 5 TABLET ORAL at 10:35

## 2018-11-07 RX ADMIN — Medication 2: at 13:38

## 2018-11-07 RX ADMIN — OXYCODONE HYDROCHLORIDE 60 MILLIGRAM(S): 5 TABLET ORAL at 07:00

## 2018-11-07 RX ADMIN — Medication 2: at 17:51

## 2018-11-07 RX ADMIN — SIMVASTATIN 40 MILLIGRAM(S): 20 TABLET, FILM COATED ORAL at 21:20

## 2018-11-07 RX ADMIN — OXYCODONE HYDROCHLORIDE 60 MILLIGRAM(S): 5 TABLET ORAL at 17:56

## 2018-11-07 RX ADMIN — POLYETHYLENE GLYCOL 3350 17 GRAM(S): 17 POWDER, FOR SOLUTION ORAL at 17:56

## 2018-11-07 RX ADMIN — OXYCODONE HYDROCHLORIDE 5 MILLIGRAM(S): 5 TABLET ORAL at 16:04

## 2018-11-07 RX ADMIN — OXYCODONE HYDROCHLORIDE 60 MILLIGRAM(S): 5 TABLET ORAL at 06:04

## 2018-11-07 RX ADMIN — INSULIN GLARGINE 20 UNIT(S): 100 INJECTION, SOLUTION SUBCUTANEOUS at 22:50

## 2018-11-07 RX ADMIN — LISINOPRIL 5 MILLIGRAM(S): 2.5 TABLET ORAL at 06:05

## 2018-11-07 RX ADMIN — Medication 5 UNIT(S): at 13:39

## 2018-11-07 RX ADMIN — Medication 20 MILLIGRAM(S): at 06:04

## 2018-11-07 RX ADMIN — Medication 5 UNIT(S): at 17:51

## 2018-11-07 NOTE — PROGRESS NOTE ADULT - PROBLEM SELECTOR PLAN 2
b/l leg edema, most likely due to body habitus, no prior CHF history but is on home lasix  -c/w lasix 20mg qd for b/l LE edema but will hold if SBP <100 and renal fxn worsens

## 2018-11-07 NOTE — CONSULT NOTE ADULT - ASSESSMENT
63 year old with DM, CKD< hs of DVY with recent workup for pain in right lower leg around the calf.  doppler studies suggest small vessel disease: and she does have pulses on exam  no open wds. MRI reviewed severe Charcot's joint of the right foot.  The lac of an open ulcer makes it very unlikely that these changes are the result of infection.      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

## 2018-11-07 NOTE — PROGRESS NOTE ADULT - PROBLEM SELECTOR PLAN 5
On home 74U and 70U qhs Humulin and Novolog 5U premeals, 8.2% on 8/2018  -c/w premeals Humalog 5U premeals, brittle DM, holding off Humulin/Basal doses for now given hypoglycemia overnight  -moderate ISS and moderate qhs for now since hypoglycemic in ED

## 2018-11-07 NOTE — PROGRESS NOTE ADULT - PROBLEM SELECTOR PLAN 1
R leg pain most likely due advanced charcot arthropathy  -f/u MRI w/ severe and progression of charcot arthropathy in mid and hind foot  -hold off tx, ID input appreciated likely chronic changes and not due to infection  Podiatry eval pending  -c/w same home pain control w/ oxycontin 60 mg tablet BID and tylenol PRN and oxycodone 5mg PRN. For severe or breakthrough pain  ISTOP: [id number]

## 2018-11-07 NOTE — PROGRESS NOTE ADULT - PROBLEM SELECTOR PLAN 4
Cervical and Lumbar Stenosis of the spine for years and is dependent on her wheelchair and walker  Pain control w/ oxycontin 60 mg tablet BID and tylenol PRN, ISTOP: [id number]  Can add hydrocodone 7.5mg TID PRN if pain still not controlled

## 2018-11-07 NOTE — CONSULT NOTE ADULT - ASSESSMENT
62 y/o female with charcot R Foot   ·	pt seen and evaluated   ·	MRI and Tib/Fib xray reviewed   ·	explained etiology and treatment options of charcot to patient   ·	foot XR ordered to evaluate for charcot   ·	no wounds, no acute signs of infection   ·	no plan for acute podiatric surgical intervention   ·	will continue to follow   ·	discussed w/ attending 62 y/o female with charcot R Foot   ·	pt seen and evaluated   ·	MRI and Tib/Fib xray reviewed   ·	explained etiology and treatment options of charcot to patient   ·	foot XR ordered to evaluate for charcot   ·	no wounds, no acute signs of infection   ·	podiatry plan pending   ·	will continue to follow   ·	discussed w/ attending

## 2018-11-07 NOTE — PROGRESS NOTE ADULT - ASSESSMENT
63y Female with a PMH of IDDM, HTN, CKD, morbid obesity, L DVT (2006), HLD, cervical and lumbar stenosis a/w acute on chronic R foot pain s/p MRI - appears to be due to progression of charcot arthropathy in mid and hind foot.

## 2018-11-07 NOTE — CONSULT NOTE ADULT - SUBJECTIVE AND OBJECTIVE BOX
Patient is a 63y old  Female who presents with a chief complaint of R leg pain (06 Nov 2018 02:37)    HPI:  Patient is a 63y Female with a PMH of IDDM (A1c 8.2% on 8/2018), HTN, CKD, morbid obesity, provoked L DVT (2006), HLD, cervical and lumbar stenosis p/w R leg pain for the last 2 weeks. It has been worsening to the point that she isn't able to ambulate with her walker anymore and has has been using a wheelchair in order to get around. She reports that in the posterior calf, increased warmth and pain radiates to her feet and that she can't put any pressure on her foot. She has had 4 prior fractures on her R foot but last fracture was 5 years ago and no surgeries on it. Has noticed gradual skin changes bilaterally on her lower extremities. She had gotten workup outpt and was shown to have small vessel disease in her feet but no doppler abnormalities is of low, VINOD 1.26 (R) and 1.22 (L) in the legs and noncompressible. She denies any recent infections, fevers, nausea, vomiting, diarrhea, CP, SOB.    In the ED Vitals signs were:  T(C): 36.7 (11-05-18 @ 23:53), Max: 36.7 (11-05-18 @ 19:20)  HR: 74 (11-05-18 @ 23:53) (72 - 108)  BP: 118/69 (11-05-18 @ 23:53) (118/69 - 143/63)  RR: 20 (11-05-18 @ 23:53) (20 - 20)  SpO2: 94% (11-05-18 @ 23:53) (94% - 98%)  Given in th ED: IV Dilaudid 1mg x1, morphine 5mg x1 , oxycodone 5mg x1 (06 Nov 2018 02:37)      PAST MEDICAL & SURGICAL HISTORY:  Other fecal abnormalities: +iFOBT  Narrow angle glaucoma of left eye  CKD (chronic kidney disease)  Insomnia  Edema of lower extremity: on lasix  Vitamin D deficiency  Morbid Obesity  Cataract  Dyslipidemia  Deep Vein Thrombosis (DVT): Left leg, 2004, treated and resolved  Spinal Stenosis, Lumbar  Cervical Stenosis of Spine  Endometrial Hyperplasia  HTN (Hypertension)  Diabetes Mellitus Type II  H/O colonoscopy: 1998  H/O laser iridotomy: left eye, 2016  Endometrial biopsy 12/02/09  Tonsillectomy as a child  Cervical Spinal Stenosis surgery x2 (01/2002, 7/2002)  D&C 2008: hysteroscopy, endometrial hyperplasia, 2009  D&C x2 1980's  Cholecystectomy/appendectomy @ age 25  C Section 1994  Cataract extracted with lens implant 1998: Right      Social history:    FAMILY HISTORY:  Family history of lung cancer (Grandparent)  Family history of bladder cancer (Father)  Family history of pancreatic cancer (Father)    REVIEW OF SYSTEMS  General:	Denies any malaise fatigue or chills. Fevers absent    Skin:No rash  	  Ophthalmologic:Denies any visual complaints,discharge redness or photophobia  	  ENMT:No nasal discharge,headache,sinus congestion or throat pain.No dental complaints    Respiratory and Thorax:No cough,sputum or chest pain.Denies shortness of breath  	  Cardiovascular:	No chest pain,palpitaions or dizziness    Gastrointestinal:	NO nausea,abdominal pain or diarrhea.    Genitourinary:	No dysuria,frequency. No flank pain    Musculoskeletal:	No joint swelling or pain.No weakness    Neurological:No confusion,diziness.No extremity weakness.No bladder or bowel incontinence	    Psychiatric:No delusions or hallucinations	    Hematology/Lymphatics:	No LN swelling.No gum bleeding     Endocrine:	No recent weight gain or loss.No abnormal heat/cold intolerance    Allergic/Immunologic:	No hives or rash   Allergies    adhesives (Rash)  latex (Rash)  No Known Drug Allergies  wool- rash, itch (Other)    Intolerances        Antimicrobials:          Vital Signs Last 24 Hrs  T(C): 36.7 (07 Nov 2018 04:46), Max: 37.1 (06 Nov 2018 14:38)  T(F): 98 (07 Nov 2018 04:46), Max: 98.7 (06 Nov 2018 14:38)  HR: 72 (07 Nov 2018 04:46) (72 - 77)  BP: 127/60 (07 Nov 2018 04:46) (106/64 - 137/55)  BP(mean): --  RR: 18 (07 Nov 2018 04:46) (18 - 18)  SpO2: 96% (07 Nov 2018 04:46) (93% - 96%)    PHYSICAL EXAM:Pleasant patient in no acute distress.      Constitutional:Comfortable.Awake and alert  No cachexia     Eyes:PERRL EOMI.NO discharge or conjunctival injection    ENMT:No sinus tenderness.No thrush.No pharyngeal exudate or erythema.Fair dental hygiene    Neck:Supple,No LN,no JVD      Respiratory:Good air entry bilaterally,CTA    Cardiovascular:S1 S2 wnl, No murmurs,rub or gallops    Gastrointestinal:Soft BS(+) no tenderness no masses ,No rebound or guarding    Genitourinary:No CVA tendereness     Rectal:    Extremities:No cyanosis,clubbing or edema.    Vascular:peripheral pulses felt  no cellulitis  chronic changes of legs due to DM.                                   13.7   9.73  )-----------( 239      ( 07 Nov 2018 08:07 )             43.5         11-07    144  |  105  |  27<H>  ----------------------------<  130<H>  4.9   |  28  |  1.78<H>    Ca    9.4      07 Nov 2018 07:08        RECENT CULTURES:      MICROBIOLOGY:          Radiology:      Assessment:        Recommendations and Plan:    Pager [phone]  After 5 pm/weekends or if no response :[phone]

## 2018-11-07 NOTE — PROGRESS NOTE ADULT - SUBJECTIVE AND OBJECTIVE BOX
Patient is a 63y old  Female who presents with a chief complaint of R leg pain (07 Nov 2018 09:31)      SUBJECTIVE / OVERNIGHT EVENTS: Patient seen and examined at bedside - patient overall doing ok, still unable to bear weight on her R foot. She has 10/10 pain starting from her foot and radiating to her calf. Pain is relieved with rest and pain medication. She also reports constipation for 1 weeks    ROS:  Per HPI. All other review of systems negative    Allergies    adhesives (Rash)  latex (Rash)  No Known Drug Allergies  wool- rash, itch (Other)    Intolerances        MEDICATIONS  (STANDING):  aspirin  chewable 81 milliGRAM(s) Oral daily  clonazePAM Tablet 0.5 milliGRAM(s) Oral at bedtime  dextrose 5%. 1000 milliLiter(s) (50 mL/Hr) IV Continuous <Continuous>  dextrose 50% Injectable 12.5 Gram(s) IV Push once  dextrose 50% Injectable 25 Gram(s) IV Push once  dextrose 50% Injectable 25 Gram(s) IV Push once  furosemide    Tablet 20 milliGRAM(s) Oral daily  heparin  Injectable 5000 Unit(s) SubCutaneous every 8 hours  influenza   Vaccine 0.5 milliLiter(s) IntraMuscular once  insulin glargine Injectable (LANTUS) 20 Unit(s) SubCutaneous at bedtime  insulin lispro (HumaLOG) corrective regimen sliding scale   SubCutaneous at bedtime  insulin lispro (HumaLOG) corrective regimen sliding scale   SubCutaneous three times a day before meals  insulin lispro Injectable (HumaLOG) 5 Unit(s) SubCutaneous three times a day before meals  lisinopril 5 milliGRAM(s) Oral daily  oxyCODONE  ER Tablet 60 milliGRAM(s) Oral every 12 hours  polyethylene glycol 3350 17 Gram(s) Oral two times a day  simvastatin 40 milliGRAM(s) Oral at bedtime    MEDICATIONS  (PRN):  acetaminophen   Tablet .. 650 milliGRAM(s) Oral every 6 hours PRN Mild Pain (1 - 3), Moderate Pain (4 - 6)  ALBUTerol    90 MICROgram(s) HFA Inhaler 2 Puff(s) Inhalation every 6 hours PRN Shortness of Breath and/or Wheezing  ALBUTerol/ipratropium for Nebulization 3 milliLiter(s) Nebulizer every 6 hours PRN Wheezing  dextrose 40% Gel 15 Gram(s) Oral once PRN Blood Glucose LESS THAN 70 milliGRAM(s)/deciliter  glucagon  Injectable 1 milliGRAM(s) IntraMuscular once PRN Glucose LESS THAN 70 milligrams/deciliter  oxyCODONE    IR 5 milliGRAM(s) Oral every 4 hours PRN Moderate Pain (4 - 6)      Vital Signs Last 24 Hrs  T(C): 36.7 (07 Nov 2018 04:46), Max: 37.1 (06 Nov 2018 14:38)  T(F): 98 (07 Nov 2018 04:46), Max: 98.7 (06 Nov 2018 14:38)  HR: 72 (07 Nov 2018 04:46) (72 - 77)  BP: 127/60 (07 Nov 2018 04:46) (106/64 - 137/55)  BP(mean): --  RR: 18 (07 Nov 2018 04:46) (18 - 18)  SpO2: 96% (07 Nov 2018 04:46) (93% - 96%)  CAPILLARY BLOOD GLUCOSE      POCT Blood Glucose.: 145 mg/dL (07 Nov 2018 08:52)  POCT Blood Glucose.: 165 mg/dL (06 Nov 2018 22:33)  POCT Blood Glucose.: 134 mg/dL (06 Nov 2018 18:18)  POCT Blood Glucose.: 79 mg/dL (06 Nov 2018 12:58)    I&O's Summary    06 Nov 2018 07:01  -  07 Nov 2018 07:00  --------------------------------------------------------  IN: 580 mL / OUT: 700 mL / NET: -120 mL    07 Nov 2018 07:01  -  07 Nov 2018 11:22  --------------------------------------------------------  IN: 360 mL / OUT: 0 mL / NET: 360 mL        PHYSICAL EXAM:  GENERAL: NAD, obese  HEAD:  Atraumatic, Normocephalic  EYES: EOMI, PERRLA, conjunctiva and sclera clear  NECK: Supple, No JVD  CHEST/LUNG: Clear to auscultation bilaterally; No wheeze  HEART: Regular rate and rhythm; No murmurs, rubs, or gallops  ABDOMEN: Soft, Nontender, Nondistended; Bowel sounds present  EXTREMITIES:  2+ Peripheral Pulses, nonpitting edema b/l, b/l chronic venous stasis  NEUROLOGY: AAOx3, non-focal  PSYCH: calm  SKIN: No rashes or lesions    LABS:                        13.7   9.73  )-----------( 239      ( 07 Nov 2018 08:07 )             43.5     11-07    144  |  105  |  27<H>  ----------------------------<  130<H>  4.9   |  28  |  1.78<H>    Ca    9.4      07 Nov 2018 07:08      PT/INR - ( 05 Nov 2018 19:21 )   PT: 10.8 sec;   INR: 0.95 ratio         PTT - ( 05 Nov 2018 19:21 )  PTT:29.0 sec          RADIOLOGY & ADDITIONAL TESTS:    Imaging Personally Reviewed: MRI reviewed 1.  Severe Charcot arthropathy of the hindfoot and midfoot progressed   compared to prior radiographs. Given the worsening erosive change and   fluid/synovitis surrounding the tarsal articulations, superimposed acute   infection is not excluded.  2.  Nonspecific soft tissue swelling about the ankle and foot as can be   seen in the setting of cellulitis. Correlate clinically.  3.  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      Consultant(s) Notes Reviewed:  ID, podiatry

## 2018-11-07 NOTE — CONSULT NOTE ADULT - SUBJECTIVE AND OBJECTIVE BOX
Patient is a 63y old  Female who presents with a chief complaint of R leg pain (07 Nov 2018 09:11)      HPI:  Patient is a 63y Female with a PMH of IDDM (A1c 8.2% on 8/2018), HTN, CKD, morbid obesity, provoked L DVT (2006), HLD, cervical and lumbar stenosis p/w R leg pain for the last 2 weeks. It has been worsening to the point that she isn't able to ambulate with her walker anymore and has has been using a wheelchair in order to get around. She reports that in the posterior calf, increased warmth and pain radiates to her feet and that she can't put any pressure on her foot. She has had 4 prior fractures on her R foot but last fracture was 5 years ago and no surgeries on it. Has noticed gradual skin changes bilaterally on her lower extremities. She had gotten workup outpt and was shown to have small vessel disease in her feet but no doppler abnormalities is of low, VINOD 1.26 (R) and 1.22 (L) in the legs and noncompressible. She denies any recent infections, fevers, nausea, vomiting, diarrhea, CP, SOB.    In the ED Vitals signs were:  T(C): 36.7 (11-05-18 @ 23:53), Max: 36.7 (11-05-18 @ 19:20)  HR: 74 (11-05-18 @ 23:53) (72 - 108)  BP: 118/69 (11-05-18 @ 23:53) (118/69 - 143/63)  RR: 20 (11-05-18 @ 23:53) (20 - 20)  SpO2: 94% (11-05-18 @ 23:53) (94% - 98%)  Given in th ED: IV Dilaudid 1mg x1, morphine 5mg x1 , oxycodone 5mg x1 (06 Nov 2018 02:37)      PAST MEDICAL & SURGICAL HISTORY:  Other fecal abnormalities: +iFOBT  Narrow angle glaucoma of left eye  CKD (chronic kidney disease)  Insomnia  Edema of lower extremity: on lasix  Vitamin D deficiency  Morbid Obesity  Cataract  Dyslipidemia  Deep Vein Thrombosis (DVT): Left leg, 2004, treated and resolved  Spinal Stenosis, Lumbar  Cervical Stenosis of Spine  Endometrial Hyperplasia  HTN (Hypertension)  Diabetes Mellitus Type II  H/O colonoscopy: 1998  H/O laser iridotomy: left eye, 2016  Endometrial biopsy 12/02/09  Tonsillectomy as a child  Cervical Spinal Stenosis surgery x2 (01/2002, 7/2002)  D&C 2008: hysteroscopy, endometrial hyperplasia, 2009  D&C x2 1980's  Cholecystectomy/appendectomy @ age 25  C Section 1994  Cataract extracted with lens implant 1998: Right      MEDICATIONS  (STANDING):  aspirin  chewable 81 milliGRAM(s) Oral daily  bisacodyl Suppository 10 milliGRAM(s) Rectal once  clonazePAM Tablet 0.5 milliGRAM(s) Oral at bedtime  dextrose 5%. 1000 milliLiter(s) (50 mL/Hr) IV Continuous <Continuous>  dextrose 50% Injectable 12.5 Gram(s) IV Push once  dextrose 50% Injectable 25 Gram(s) IV Push once  dextrose 50% Injectable 25 Gram(s) IV Push once  furosemide    Tablet 20 milliGRAM(s) Oral daily  heparin  Injectable 5000 Unit(s) SubCutaneous every 8 hours  influenza   Vaccine 0.5 milliLiter(s) IntraMuscular once  insulin glargine Injectable (LANTUS) 20 Unit(s) SubCutaneous at bedtime  insulin lispro (HumaLOG) corrective regimen sliding scale   SubCutaneous at bedtime  insulin lispro (HumaLOG) corrective regimen sliding scale   SubCutaneous three times a day before meals  insulin lispro Injectable (HumaLOG) 5 Unit(s) SubCutaneous three times a day before meals  lisinopril 5 milliGRAM(s) Oral daily  oxyCODONE  ER Tablet 60 milliGRAM(s) Oral every 12 hours  polyethylene glycol 3350 17 Gram(s) Oral two times a day  simvastatin 40 milliGRAM(s) Oral at bedtime    MEDICATIONS  (PRN):  acetaminophen   Tablet .. 650 milliGRAM(s) Oral every 6 hours PRN Mild Pain (1 - 3), Moderate Pain (4 - 6)  ALBUTerol    90 MICROgram(s) HFA Inhaler 2 Puff(s) Inhalation every 6 hours PRN Shortness of Breath and/or Wheezing  ALBUTerol/ipratropium for Nebulization 3 milliLiter(s) Nebulizer every 6 hours PRN Wheezing  dextrose 40% Gel 15 Gram(s) Oral once PRN Blood Glucose LESS THAN 70 milliGRAM(s)/deciliter  glucagon  Injectable 1 milliGRAM(s) IntraMuscular once PRN Glucose LESS THAN 70 milligrams/deciliter  oxyCODONE    IR 5 milliGRAM(s) Oral every 4 hours PRN Moderate Pain (4 - 6)      Allergies    adhesives (Rash)  latex (Rash)  No Known Drug Allergies  wool- rash, itch (Other)    Intolerances        VITALS:    Vital Signs Last 24 Hrs  T(C): 36.7 (07 Nov 2018 04:46), Max: 37.1 (06 Nov 2018 14:38)  T(F): 98 (07 Nov 2018 04:46), Max: 98.7 (06 Nov 2018 14:38)  HR: 72 (07 Nov 2018 04:46) (72 - 77)  BP: 127/60 (07 Nov 2018 04:46) (106/64 - 137/55)  BP(mean): --  RR: 18 (07 Nov 2018 04:46) (18 - 18)  SpO2: 96% (07 Nov 2018 04:46) (93% - 96%)    LABS:                          13.7   9.73  )-----------( 239      ( 07 Nov 2018 08:07 )             43.5       11-07    144  |  105  |  27<H>  ----------------------------<  130<H>  4.9   |  28  |  1.78<H>    Ca    9.4      07 Nov 2018 07:08        CAPILLARY BLOOD GLUCOSE      POCT Blood Glucose.: 145 mg/dL (07 Nov 2018 08:52)  POCT Blood Glucose.: 165 mg/dL (06 Nov 2018 22:33)  POCT Blood Glucose.: 134 mg/dL (06 Nov 2018 18:18)  POCT Blood Glucose.: 79 mg/dL (06 Nov 2018 12:58)      PT/INR - ( 05 Nov 2018 19:21 )   PT: 10.8 sec;   INR: 0.95 ratio         PTT - ( 05 Nov 2018 19:21 )  PTT:29.0 sec    LOWER EXTREMITY PHYSICAL EXAM:    Vasular: DP/PT 2/4, B/L, CFT <3 seconds B/L, Temperature gradient wnl, B/L.   Neuro: Epicritic sensation dimnished to the level of foot, B/L.  Musculoskeletal/Ortho: midfoot collapse R foot, diffuse swelling of b/l LE   Skin: no openings, no acute signs of infection, hyperpigmentation anterior shin b/l       RADIOLOGY & ADDITIONAL STUDIES:    < from: Xray Tibia + Fibula 2 Views, Right (11.05.18 @ 19:48) >    EXAM:  TIBIA AND FIBULA AP AND LAT RT                            PROCEDURE DATE:  11/05/2018            INTERPRETATION:  CLINICAL INDICATION: Right-sided swelling and pain for   one week    TECHNIQUE: Views of the right tibia/fibula    COMPARISON: No prior radiographs of the right tibia/fibula. Radiographs   of the right foot 12 November 2011.    Impression:    There is mild nonspecific cortical thickening/periosteal reaction at the   proximal tibial shaft medially and around the proximal to mid fibular   shaft which are age-indeterminate. There is diffuse soft tissue swelling.   There is marked arthrosis at the partially imaged right hindfoot   consistent with a Charcot arthropathy as seen previously. There are   chronic calcifications at the lower leg anteriorly. If there is   persistent clinical concern for osteomyelitis in any of these regions,   MRI can be performed for further evaluation.                  SHANELL TOLEDO M.D., RADIOLOGY RESIDENT  This document has been electronically signed.  CHINO SOSA M.D., ATTENDING RADIOLOGIST  This document has been electronically signed. Nov 6 2018 12:43PM        < end of copied text >    < from: MR Ankle w/wo IV Cont, Right (11.06.18 @ 14:03) >  INTERPRETATION:  MR ANKLE WAW IC RT dated 11/6/2018 2:03 PM     INDICATION: Right ankle pain and swelling. Pain and swelling extending   into the calf.    COMPARISON: Lower extremity Doppler dated 11/5/2018. Foot radiographs   dated 7/14/2016.    TECHNIQUE: Multi-sequential, multiplanar MRI imaging of the ankle and   hindfoot was performed per standard protocol. Images were obtained before   and after the administration of IV contrast.  Contrast: Gadavist. Administered: 10 cc. Discarded: 0 cc.    FINDINGS:    BONE MARROW: There is severe hindfoot and midfoot Charcot arthropathy   with erosive changes involving the calcaneocuboid joint. There is   destruction and degeneration of the talus and navicula. There are erosive   changes along the proximal cuneiforms. Debris is seen along the dorsal   aspect of the midfoot with several osseous bodies. There is intermediate   signal on T1-weighted imaging within the residual cuboid and distal   calcaneus. Erosive changes and intermediate signal within the   sustentaculum yohannes. There is narrowing and fibrillation of cartilage in   the tibiotalar joint. Suspect several small loose bodies posterior to the  tibiotalar joint. There is thinning of the cartilage in the posterior   subtalar joint.  SYNOVIUM/ JOINT FLUID: There is a moderate tibiotalar joint effusion.   There is nonspecific fluid seen throughout the midfoot. There is diffuse   synovial enhancement within the midfoot.  TENDONS: The visualized portions of the tendons are intact. No   full-thickness tear or retraction is seen.  MUSCLES: There is marked atrophy of the intrinsic musculature the foot   and of the calf musculature. There is nonspecific edema within the calf   musculature.  NEUROVASCULAR STRUCTURES: There is edema surrounding the sural and tibial   neurovascular bundles.  SUBCUTANEOUS SOFT TISSUES: There is moderate circumferential lower calf   soft tissue swelling extending into the ankle and forefoot. No drainable   fluid collection is seen.    IMPRESSION:    1.  Severe Charcot arthropathy of the hindfoot and midfoot progressed   compared to prior radiographs. Given the worsening erosive change and   fluid/synovitis surrounding the tarsal articulations, superimposed acute   infection is not excluded.  2.  Nonspecific soft tissue swelling about the ankle and foot as can be   seen in the setting of cellulitis. Correlate clinically.  3.  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                    GABBY FONG M.D., ATTENDING RADIOLOGIST  This document has been electronically signed. Nov 6 2018  3:34PM    < end of copied text > Patient is a 63y old  Female who presents with a chief complaint of R leg pain (07 Nov 2018 09:11)      HPI:  Patient is a 63y Female with a PMH of IDDM (A1c 8.2% on 8/2018), HTN, CKD, morbid obesity, provoked L DVT (2006), HLD, cervical and lumbar stenosis p/w R leg pain for the last 2 weeks. It has been worsening to the point that she isn't able to ambulate with her walker anymore and has has been using a wheelchair in order to get around. She reports that in the posterior calf, increased warmth and pain radiates to her feet and that she can't put any pressure on her foot. She has had 4 prior fractures on her R foot but last fracture was 5 years ago and no surgeries on it. Has noticed gradual skin changes bilaterally on her lower extremities. She had gotten workup outpt and was shown to have small vessel disease in her feet but no doppler abnormalities is of low, VINOD 1.26 (R) and 1.22 (L) in the legs and noncompressible. She denies any recent infections, fevers, nausea, vomiting, diarrhea, CP, SOB.    In the ED Vitals signs were:  T(C): 36.7 (11-05-18 @ 23:53), Max: 36.7 (11-05-18 @ 19:20)  HR: 74 (11-05-18 @ 23:53) (72 - 108)  BP: 118/69 (11-05-18 @ 23:53) (118/69 - 143/63)  RR: 20 (11-05-18 @ 23:53) (20 - 20)  SpO2: 94% (11-05-18 @ 23:53) (94% - 98%)  Given in th ED: IV Dilaudid 1mg x1, morphine 5mg x1 , oxycodone 5mg x1 (06 Nov 2018 02:37)      PAST MEDICAL & SURGICAL HISTORY:  Other fecal abnormalities: +iFOBT  Narrow angle glaucoma of left eye  CKD (chronic kidney disease)  Insomnia  Edema of lower extremity: on lasix  Vitamin D deficiency  Morbid Obesity  Cataract  Dyslipidemia  Deep Vein Thrombosis (DVT): Left leg, 2004, treated and resolved  Spinal Stenosis, Lumbar  Cervical Stenosis of Spine  Endometrial Hyperplasia  HTN (Hypertension)  Diabetes Mellitus Type II  H/O colonoscopy: 1998  H/O laser iridotomy: left eye, 2016  Endometrial biopsy 12/02/09  Tonsillectomy as a child  Cervical Spinal Stenosis surgery x2 (01/2002, 7/2002)  D&C 2008: hysteroscopy, endometrial hyperplasia, 2009  D&C x2 1980's  Cholecystectomy/appendectomy @ age 25  C Section 1994  Cataract extracted with lens implant 1998: Right      MEDICATIONS  (STANDING):  aspirin  chewable 81 milliGRAM(s) Oral daily  bisacodyl Suppository 10 milliGRAM(s) Rectal once  clonazePAM Tablet 0.5 milliGRAM(s) Oral at bedtime  dextrose 5%. 1000 milliLiter(s) (50 mL/Hr) IV Continuous <Continuous>  dextrose 50% Injectable 12.5 Gram(s) IV Push once  dextrose 50% Injectable 25 Gram(s) IV Push once  dextrose 50% Injectable 25 Gram(s) IV Push once  furosemide    Tablet 20 milliGRAM(s) Oral daily  heparin  Injectable 5000 Unit(s) SubCutaneous every 8 hours  influenza   Vaccine 0.5 milliLiter(s) IntraMuscular once  insulin glargine Injectable (LANTUS) 20 Unit(s) SubCutaneous at bedtime  insulin lispro (HumaLOG) corrective regimen sliding scale   SubCutaneous at bedtime  insulin lispro (HumaLOG) corrective regimen sliding scale   SubCutaneous three times a day before meals  insulin lispro Injectable (HumaLOG) 5 Unit(s) SubCutaneous three times a day before meals  lisinopril 5 milliGRAM(s) Oral daily  oxyCODONE  ER Tablet 60 milliGRAM(s) Oral every 12 hours  polyethylene glycol 3350 17 Gram(s) Oral two times a day  simvastatin 40 milliGRAM(s) Oral at bedtime    MEDICATIONS  (PRN):  acetaminophen   Tablet .. 650 milliGRAM(s) Oral every 6 hours PRN Mild Pain (1 - 3), Moderate Pain (4 - 6)  ALBUTerol    90 MICROgram(s) HFA Inhaler 2 Puff(s) Inhalation every 6 hours PRN Shortness of Breath and/or Wheezing  ALBUTerol/ipratropium for Nebulization 3 milliLiter(s) Nebulizer every 6 hours PRN Wheezing  dextrose 40% Gel 15 Gram(s) Oral once PRN Blood Glucose LESS THAN 70 milliGRAM(s)/deciliter  glucagon  Injectable 1 milliGRAM(s) IntraMuscular once PRN Glucose LESS THAN 70 milligrams/deciliter  oxyCODONE    IR 5 milliGRAM(s) Oral every 4 hours PRN Moderate Pain (4 - 6)      Allergies    adhesives (Rash)  latex (Rash)  No Known Drug Allergies  wool- rash, itch (Other)    Intolerances        VITALS:    Vital Signs Last 24 Hrs  T(C): 36.7 (07 Nov 2018 04:46), Max: 37.1 (06 Nov 2018 14:38)  T(F): 98 (07 Nov 2018 04:46), Max: 98.7 (06 Nov 2018 14:38)  HR: 72 (07 Nov 2018 04:46) (72 - 77)  BP: 127/60 (07 Nov 2018 04:46) (106/64 - 137/55)  BP(mean): --  RR: 18 (07 Nov 2018 04:46) (18 - 18)  SpO2: 96% (07 Nov 2018 04:46) (93% - 96%)    LABS:                          13.7   9.73  )-----------( 239      ( 07 Nov 2018 08:07 )             43.5       11-07    144  |  105  |  27<H>  ----------------------------<  130<H>  4.9   |  28  |  1.78<H>    Ca    9.4      07 Nov 2018 07:08        CAPILLARY BLOOD GLUCOSE      POCT Blood Glucose.: 145 mg/dL (07 Nov 2018 08:52)  POCT Blood Glucose.: 165 mg/dL (06 Nov 2018 22:33)  POCT Blood Glucose.: 134 mg/dL (06 Nov 2018 18:18)  POCT Blood Glucose.: 79 mg/dL (06 Nov 2018 12:58)      PT/INR - ( 05 Nov 2018 19:21 )   PT: 10.8 sec;   INR: 0.95 ratio         PTT - ( 05 Nov 2018 19:21 )  PTT:29.0 sec    LOWER EXTREMITY PHYSICAL EXAM:    Vasular: DP/PT 2/4, B/L, CFT <3 seconds B/L, Temperature gradient wnl, B/L.   Neuro: Epicritic sensation dimnished to the level of foot, B/L.  Musculoskeletal/Ortho: midfoot collapse R foot, diffuse swelling of b/l LE, no pain on palpation of b/l foot   Skin: no openings, no acute signs of infection, hyperpigmentation anterior shin b/l       RADIOLOGY & ADDITIONAL STUDIES:    < from: Xray Tibia + Fibula 2 Views, Right (11.05.18 @ 19:48) >    EXAM:  TIBIA AND FIBULA AP AND LAT RT                            PROCEDURE DATE:  11/05/2018            INTERPRETATION:  CLINICAL INDICATION: Right-sided swelling and pain for   one week    TECHNIQUE: Views of the right tibia/fibula    COMPARISON: No prior radiographs of the right tibia/fibula. Radiographs   of the right foot 12 November 2011.    Impression:    There is mild nonspecific cortical thickening/periosteal reaction at the   proximal tibial shaft medially and around the proximal to mid fibular   shaft which are age-indeterminate. There is diffuse soft tissue swelling.   There is marked arthrosis at the partially imaged right hindfoot   consistent with a Charcot arthropathy as seen previously. There are   chronic calcifications at the lower leg anteriorly. If there is   persistent clinical concern for osteomyelitis in any of these regions,   MRI can be performed for further evaluation.                  SHANELL TOLEDO M.D., RADIOLOGY RESIDENT  This document has been electronically signed.  CHINO SOSA M.D., ATTENDING RADIOLOGIST  This document has been electronically signed. Nov 6 2018 12:43PM        < end of copied text >    < from: MR Ankle w/wo IV Cont, Right (11.06.18 @ 14:03) >  INTERPRETATION:  MR ANKLE WAW IC RT dated 11/6/2018 2:03 PM     INDICATION: Right ankle pain and swelling. Pain and swelling extending   into the calf.    COMPARISON: Lower extremity Doppler dated 11/5/2018. Foot radiographs   dated 7/14/2016.    TECHNIQUE: Multi-sequential, multiplanar MRI imaging of the ankle and   hindfoot was performed per standard protocol. Images were obtained before   and after the administration of IV contrast.  Contrast: Gadavist. Administered: 10 cc. Discarded: 0 cc.    FINDINGS:    BONE MARROW: There is severe hindfoot and midfoot Charcot arthropathy   with erosive changes involving the calcaneocuboid joint. There is   destruction and degeneration of the talus and navicula. There are erosive   changes along the proximal cuneiforms. Debris is seen along the dorsal   aspect of the midfoot with several osseous bodies. There is intermediate   signal on T1-weighted imaging within the residual cuboid and distal   calcaneus. Erosive changes and intermediate signal within the   sustentaculum yohannes. There is narrowing and fibrillation of cartilage in   the tibiotalar joint. Suspect several small loose bodies posterior to the  tibiotalar joint. There is thinning of the cartilage in the posterior   subtalar joint.  SYNOVIUM/ JOINT FLUID: There is a moderate tibiotalar joint effusion.   There is nonspecific fluid seen throughout the midfoot. There is diffuse   synovial enhancement within the midfoot.  TENDONS: The visualized portions of the tendons are intact. No   full-thickness tear or retraction is seen.  MUSCLES: There is marked atrophy of the intrinsic musculature the foot   and of the calf musculature. There is nonspecific edema within the calf   musculature.  NEUROVASCULAR STRUCTURES: There is edema surrounding the sural and tibial   neurovascular bundles.  SUBCUTANEOUS SOFT TISSUES: There is moderate circumferential lower calf   soft tissue swelling extending into the ankle and forefoot. No drainable   fluid collection is seen.    IMPRESSION:    1.  Severe Charcot arthropathy of the hindfoot and midfoot progressed   compared to prior radiographs. Given the worsening erosive change and   fluid/synovitis surrounding the tarsal articulations, superimposed acute   infection is not excluded.  2.  Nonspecific soft tissue swelling about the ankle and foot as can be   seen in the setting of cellulitis. Correlate clinically.  3.  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                    GABBY FONG M.D., ATTENDING RADIOLOGIST  This document has been electronically signed. Nov 6 2018  3:34PM    < end of copied text >

## 2018-11-08 ENCOUNTER — TRANSCRIPTION ENCOUNTER (OUTPATIENT)
Age: 63
End: 2018-11-08

## 2018-11-08 DIAGNOSIS — E87.5 HYPERKALEMIA: ICD-10-CM

## 2018-11-08 LAB
ANION GAP SERPL CALC-SCNC: 15 MMOL/L — SIGNIFICANT CHANGE UP (ref 5–17)
ANION GAP SERPL CALC-SCNC: 19 MMOL/L — HIGH (ref 5–17)
BUN SERPL-MCNC: 28 MG/DL — HIGH (ref 7–23)
BUN SERPL-MCNC: 29 MG/DL — HIGH (ref 7–23)
CALCIUM SERPL-MCNC: 10 MG/DL — SIGNIFICANT CHANGE UP (ref 8.4–10.5)
CALCIUM SERPL-MCNC: 10 MG/DL — SIGNIFICANT CHANGE UP (ref 8.4–10.5)
CHLORIDE SERPL-SCNC: 103 MMOL/L — SIGNIFICANT CHANGE UP (ref 96–108)
CHLORIDE SERPL-SCNC: 99 MMOL/L — SIGNIFICANT CHANGE UP (ref 96–108)
CO2 SERPL-SCNC: 19 MMOL/L — LOW (ref 22–31)
CO2 SERPL-SCNC: 27 MMOL/L — SIGNIFICANT CHANGE UP (ref 22–31)
CREAT SERPL-MCNC: 1.59 MG/DL — HIGH (ref 0.5–1.3)
CREAT SERPL-MCNC: 1.71 MG/DL — HIGH (ref 0.5–1.3)
GLUCOSE BLDC GLUCOMTR-MCNC: 177 MG/DL — HIGH (ref 70–99)
GLUCOSE BLDC GLUCOMTR-MCNC: 212 MG/DL — HIGH (ref 70–99)
GLUCOSE BLDC GLUCOMTR-MCNC: 257 MG/DL — HIGH (ref 70–99)
GLUCOSE BLDC GLUCOMTR-MCNC: 270 MG/DL — HIGH (ref 70–99)
GLUCOSE SERPL-MCNC: 153 MG/DL — HIGH (ref 70–99)
GLUCOSE SERPL-MCNC: 270 MG/DL — HIGH (ref 70–99)
HCT VFR BLD CALC: 47.9 % — HIGH (ref 34.5–45)
HGB BLD-MCNC: 15.4 G/DL — SIGNIFICANT CHANGE UP (ref 11.5–15.5)
MCHC RBC-ENTMCNC: 30.4 PG — SIGNIFICANT CHANGE UP (ref 27–34)
MCHC RBC-ENTMCNC: 32.2 GM/DL — SIGNIFICANT CHANGE UP (ref 32–36)
MCV RBC AUTO: 94.5 FL — SIGNIFICANT CHANGE UP (ref 80–100)
PLATELET # BLD AUTO: 216 K/UL — SIGNIFICANT CHANGE UP (ref 150–400)
POTASSIUM SERPL-MCNC: 4.8 MMOL/L — SIGNIFICANT CHANGE UP (ref 3.5–5.3)
POTASSIUM SERPL-MCNC: 5.6 MMOL/L — HIGH (ref 3.5–5.3)
POTASSIUM SERPL-SCNC: 4.8 MMOL/L — SIGNIFICANT CHANGE UP (ref 3.5–5.3)
POTASSIUM SERPL-SCNC: 5.6 MMOL/L — HIGH (ref 3.5–5.3)
RBC # BLD: 5.07 M/UL — SIGNIFICANT CHANGE UP (ref 3.8–5.2)
RBC # FLD: 14.6 % — HIGH (ref 10.3–14.5)
SODIUM SERPL-SCNC: 141 MMOL/L — SIGNIFICANT CHANGE UP (ref 135–145)
SODIUM SERPL-SCNC: 141 MMOL/L — SIGNIFICANT CHANGE UP (ref 135–145)
WBC # BLD: 10.79 K/UL — HIGH (ref 3.8–10.5)
WBC # FLD AUTO: 10.79 K/UL — HIGH (ref 3.8–10.5)

## 2018-11-08 PROCEDURE — 99232 SBSQ HOSP IP/OBS MODERATE 35: CPT

## 2018-11-08 RX ORDER — INSULIN LISPRO 100/ML
8 VIAL (ML) SUBCUTANEOUS
Qty: 0 | Refills: 0 | Status: DISCONTINUED | OUTPATIENT
Start: 2018-11-08 | End: 2018-11-09

## 2018-11-08 RX ORDER — INSULIN LISPRO 100/ML
8 VIAL (ML) SUBCUTANEOUS ONCE
Qty: 0 | Refills: 0 | Status: COMPLETED | OUTPATIENT
Start: 2018-11-08 | End: 2018-11-08

## 2018-11-08 RX ORDER — FUROSEMIDE 40 MG
20 TABLET ORAL ONCE
Qty: 0 | Refills: 0 | Status: COMPLETED | OUTPATIENT
Start: 2018-11-08 | End: 2018-11-08

## 2018-11-08 RX ADMIN — Medication 6: at 12:58

## 2018-11-08 RX ADMIN — Medication 2: at 22:24

## 2018-11-08 RX ADMIN — Medication 5 UNIT(S): at 09:00

## 2018-11-08 RX ADMIN — LISINOPRIL 5 MILLIGRAM(S): 2.5 TABLET ORAL at 06:00

## 2018-11-08 RX ADMIN — OXYCODONE HYDROCHLORIDE 60 MILLIGRAM(S): 5 TABLET ORAL at 06:00

## 2018-11-08 RX ADMIN — Medication 20 MILLIGRAM(S): at 12:29

## 2018-11-08 RX ADMIN — POLYETHYLENE GLYCOL 3350 17 GRAM(S): 17 POWDER, FOR SOLUTION ORAL at 17:55

## 2018-11-08 RX ADMIN — Medication 4: at 17:52

## 2018-11-08 RX ADMIN — HEPARIN SODIUM 5000 UNIT(S): 5000 INJECTION INTRAVENOUS; SUBCUTANEOUS at 06:01

## 2018-11-08 RX ADMIN — Medication 2: at 09:01

## 2018-11-08 RX ADMIN — OXYCODONE HYDROCHLORIDE 5 MILLIGRAM(S): 5 TABLET ORAL at 08:26

## 2018-11-08 RX ADMIN — HEPARIN SODIUM 5000 UNIT(S): 5000 INJECTION INTRAVENOUS; SUBCUTANEOUS at 13:00

## 2018-11-08 RX ADMIN — OXYCODONE HYDROCHLORIDE 5 MILLIGRAM(S): 5 TABLET ORAL at 15:11

## 2018-11-08 RX ADMIN — INSULIN GLARGINE 20 UNIT(S): 100 INJECTION, SOLUTION SUBCUTANEOUS at 22:24

## 2018-11-08 RX ADMIN — OXYCODONE HYDROCHLORIDE 5 MILLIGRAM(S): 5 TABLET ORAL at 00:56

## 2018-11-08 RX ADMIN — OXYCODONE HYDROCHLORIDE 5 MILLIGRAM(S): 5 TABLET ORAL at 15:41

## 2018-11-08 RX ADMIN — Medication 20 MILLIGRAM(S): at 06:00

## 2018-11-08 RX ADMIN — SIMVASTATIN 40 MILLIGRAM(S): 20 TABLET, FILM COATED ORAL at 21:51

## 2018-11-08 RX ADMIN — OXYCODONE HYDROCHLORIDE 5 MILLIGRAM(S): 5 TABLET ORAL at 01:17

## 2018-11-08 RX ADMIN — Medication 3 MILLILITER(S): at 18:53

## 2018-11-08 RX ADMIN — Medication 0.5 MILLIGRAM(S): at 21:51

## 2018-11-08 RX ADMIN — OXYCODONE HYDROCHLORIDE 60 MILLIGRAM(S): 5 TABLET ORAL at 17:54

## 2018-11-08 RX ADMIN — Medication 3 MILLILITER(S): at 08:26

## 2018-11-08 RX ADMIN — Medication 8 UNIT(S): at 12:58

## 2018-11-08 RX ADMIN — OXYCODONE HYDROCHLORIDE 60 MILLIGRAM(S): 5 TABLET ORAL at 17:57

## 2018-11-08 RX ADMIN — OXYCODONE HYDROCHLORIDE 60 MILLIGRAM(S): 5 TABLET ORAL at 06:54

## 2018-11-08 RX ADMIN — Medication 8 UNIT(S): at 17:52

## 2018-11-08 RX ADMIN — OXYCODONE HYDROCHLORIDE 5 MILLIGRAM(S): 5 TABLET ORAL at 00:17

## 2018-11-08 RX ADMIN — HEPARIN SODIUM 5000 UNIT(S): 5000 INJECTION INTRAVENOUS; SUBCUTANEOUS at 21:51

## 2018-11-08 RX ADMIN — Medication 81 MILLIGRAM(S): at 11:08

## 2018-11-08 NOTE — DISCHARGE NOTE ADULT - PROVIDER TOKENS
KAYLEIGH:'[id number]:MIIS:[id number]' TOKEN:'114:MIIS:[id number]',FREE:[LAST:[Osvaldo],FIRST:[Prabhjot],PHONE:[phone]:[(   [phone]    -],ADDRESS:[PRABHJOT PLUNKETT [address]  [address]  Phone: [phone]]]

## 2018-11-08 NOTE — PROGRESS NOTE ADULT - ATTENDING COMMENTS
Once cleared for weight bearing status by podiatry, will need PT eval
PT eval - patient to consider [facility] vs. home PT - at home takes care of her disabled child

## 2018-11-08 NOTE — PHYSICAL THERAPY INITIAL EVALUATION ADULT - PERTINENT HX OF CURRENT PROBLEM, REHAB EVAL
63y Female with a PMH of IDDM, HTN, CKD, morbid obesity, L DVT (2006), HLD, cervical and lumbar stenosis p/w R leg pain for the last 2 weeks.

## 2018-11-08 NOTE — PROGRESS NOTE ADULT - SUBJECTIVE AND OBJECTIVE BOX
Patient is a 63y old  Female who presents with a chief complaint of R leg pain (08 Nov 2018 08:09)      SUBJECTIVE / OVERNIGHT EVENTS: Patient seen and examined at bedside - patient reports she slept well overnight, her R foot feels better as long as she does not put any pressure on it. She understands that there is not many options as it relates to treatment of her foot. She wants to speak with PT and decide between [facility] vs. home with home PT    ROS:  Per HPI. All other review of systems negative    Allergies    adhesives (Rash)  latex (Rash)  No Known Drug Allergies  wool- rash, itch (Other)    Intolerances        MEDICATIONS  (STANDING):  aspirin  chewable 81 milliGRAM(s) Oral daily  clonazePAM Tablet 0.5 milliGRAM(s) Oral at bedtime  dextrose 5%. 1000 milliLiter(s) (50 mL/Hr) IV Continuous <Continuous>  dextrose 50% Injectable 12.5 Gram(s) IV Push once  dextrose 50% Injectable 25 Gram(s) IV Push once  dextrose 50% Injectable 25 Gram(s) IV Push once  furosemide    Tablet 20 milliGRAM(s) Oral daily  heparin  Injectable 5000 Unit(s) SubCutaneous every 8 hours  influenza   Vaccine 0.5 milliLiter(s) IntraMuscular once  insulin glargine Injectable (LANTUS) 20 Unit(s) SubCutaneous at bedtime  insulin lispro (HumaLOG) corrective regimen sliding scale   SubCutaneous at bedtime  insulin lispro (HumaLOG) corrective regimen sliding scale   SubCutaneous three times a day before meals  insulin lispro Injectable (HumaLOG) 5 Unit(s) SubCutaneous three times a day before meals  lisinopril 5 milliGRAM(s) Oral daily  oxyCODONE  ER Tablet 60 milliGRAM(s) Oral every 12 hours  polyethylene glycol 3350 17 Gram(s) Oral two times a day  simvastatin 40 milliGRAM(s) Oral at bedtime    MEDICATIONS  (PRN):  acetaminophen   Tablet .. 650 milliGRAM(s) Oral every 6 hours PRN Mild Pain (1 - 3), Moderate Pain (4 - 6)  ALBUTerol    90 MICROgram(s) HFA Inhaler 2 Puff(s) Inhalation every 6 hours PRN Shortness of Breath and/or Wheezing  ALBUTerol/ipratropium for Nebulization 3 milliLiter(s) Nebulizer every 6 hours PRN Wheezing  dextrose 40% Gel 15 Gram(s) Oral once PRN Blood Glucose LESS THAN 70 milliGRAM(s)/deciliter  glucagon  Injectable 1 milliGRAM(s) IntraMuscular once PRN Glucose LESS THAN 70 milligrams/deciliter  oxyCODONE    IR 5 milliGRAM(s) Oral every 4 hours PRN Moderate Pain (4 - 6)      Vital Signs Last 24 Hrs  T(C): 36.6 (08 Nov 2018 09:30), Max: 36.8 (07 Nov 2018 16:26)  T(F): 97.9 (08 Nov 2018 09:30), Max: 98.3 (07 Nov 2018 16:26)  HR: 74 (08 Nov 2018 09:30) (72 - 78)  BP: 131/62 (08 Nov 2018 09:30) (120/69 - 146/69)  BP(mean): --  RR: 18 (08 Nov 2018 09:30) (18 - 18)  SpO2: 94% (08 Nov 2018 09:30) (92% - 97%)  CAPILLARY BLOOD GLUCOSE      POCT Blood Glucose.: 177 mg/dL (08 Nov 2018 08:56)  POCT Blood Glucose.: 226 mg/dL (07 Nov 2018 22:47)  POCT Blood Glucose.: 163 mg/dL (07 Nov 2018 17:19)  POCT Blood Glucose.: 160 mg/dL (07 Nov 2018 13:11)  POCT Blood Glucose.: 152 mg/dL (07 Nov 2018 12:01)    I&O's Summary    07 Nov 2018 07:01  -  08 Nov 2018 07:00  --------------------------------------------------------  IN: 1105 mL / OUT: 300 mL / NET: 805 mL        PHYSICAL EXAM:  GENERAL: NAD, obese  HEAD:  Atraumatic, Normocephalic  EYES: EOMI, PERRLA, conjunctiva and sclera clear  NECK: Supple, No JVD  CHEST/LUNG: Clear to auscultation bilaterally; No wheeze  HEART: Regular rate and rhythm; No murmurs, rubs, or gallops  ABDOMEN: Soft, Nontender, Nondistended; Bowel sounds present  EXTREMITIES:  2+ Peripheral Pulses, nonpitting edema b/l, b/l chronic venous stasis  NEUROLOGY: AAOx3, non-focal  PSYCH: calm  SKIN: No rashes or lesions      LABS:                        15.4   10.79 )-----------( 216      ( 08 Nov 2018 10:43 )             47.9     11-08    141  |  103  |  28<H>  ----------------------------<  153<H>  5.6<H>   |  19<L>  |  1.59<H>    Ca    10.0      08 Nov 2018 07:58                Consultant(s) Notes Reviewed:  Podiatry, ID

## 2018-11-08 NOTE — DISCHARGE NOTE ADULT - DURABLE MEDICAL EQUIPMENT AGENCY
Your bedside commode was provided by [facility] ([phone])  through your insurance.

## 2018-11-08 NOTE — DISCHARGE NOTE ADULT - PATIENT PORTAL LINK FT
You can access the [facility] Patient Portal, offered by [facility], by registering with the following website: [email or web address]

## 2018-11-08 NOTE — DISCHARGE NOTE ADULT - CARE PROVIDER_API CALL
Paula Mccann (MD), Internal Medicine  [address] [place] [address]  Phone: [phone]  Fax: [phone] Paula Mccann), Internal Medicine  [address] [facility] [address]  [place] [address]  Phone: [phone]  Fax: [phone]    Nickie Riley, DPEVERTON  [address] [place] [address]  [address]  Phone: [phone]  Phone: [phone]  Fax: (   )    -

## 2018-11-08 NOTE — DISCHARGE NOTE ADULT - MEDICATION SUMMARY - MEDICATIONS TO TAKE
I will START or STAY ON the medications listed below when I get home from the hospital:    aspirin 81 mg oral tablet  -- 1 tab(s) by mouth once a day, prophylaxis  -- Indication: For CKD (chronic kidney disease)    OxyCONTIN 60 mg oral tablet, extended release  -- 1 tab(s) by mouth every 12 hours, As Needed  -- Indication: For Pain of right lower extremity    Vicodin ES 7.5 mg-300 mg oral tablet  -- 1 tab(s) by mouth 3 times a day, As Needed  -- Indication: For Pain of right lower extremity    Zestril 5 mg oral tablet  -- 1 tab(s) by mouth once a day  -- Indication: For HTN (Hypertension)    clonazePAM  -- 0.5 milligram(s) by mouth once (at bedtime), As Needed  -- Indication: For Mood    HumuLIN N 100 units/mL subcutaneous suspension  -- 74 unit(s) subcutaneous once a day (in the morning)  -- Indication: For Diabetes mellitus type 2, insulin dependent    HumuLIN N 100 units/mL subcutaneous suspension  -- 70 unit(s) subcutaneous once a day (at bedtime)  -- Indication: For Diabetes mellitus type 2, insulin dependent    NovoLOG 100 units/mL subcutaneous solution  -- 5 unit(s) subcutaneous 3 times a day (before meals)  -- Indication: For Diabetes mellitus type 2, insulin dependent    simvastatin 40 mg oral tablet  -- 1 tab(s) by mouth once a day (at bedtime)  -- Indication: For Cholesterol    Proventil HFA 90 mcg/inh inhalation aerosol  -- 2 puff(s) inhaled 4 times a day, As Needed  -- Indication: For Bronchodilator    Lasix 20 mg oral tablet  -- 1 tab(s) by mouth every other day, alternates with Lasix 40mg  -- Indication: For Water pill    Lasix 40 mg oral tablet  -- 1 tab(s) by mouth every other day, alternates with lasix 20mg  -- Indication: For Water pill    MiraLax oral powder for reconstitution  -- 17 gram(s) by mouth once a day  -- Indication: For Laxative    cyclobenzaprine 10 mg oral tablet  -- 1 tab(s) by mouth 2 times a day, As Needed -Muscle Spasm - for muscle spasm   -- Indication: For Muscle relaxant    Vitamin D3 1000 intl units oral capsule  -- 1 cap(s) by mouth once a day  -- Indication: For Supplement

## 2018-11-08 NOTE — PROGRESS NOTE ADULT - PROBLEM SELECTOR PLAN 3
b/l leg edema, most likely due to body habitus, no prior CHF history but is on lasix at home  -c/w lasix 20mg qd for b/l LE edema but will hold if SBP <100 and renal fxn worsens

## 2018-11-08 NOTE — PHYSICAL THERAPY INITIAL EVALUATION ADULT - PRECAUTIONS/LIMITATIONS, REHAB EVAL
MRI R foot: Severe Charcot arthropathy of the hindfoot and midfoot progressed compared to prior radiographs.  Nonspecific soft tissue swelling about the ankle and foot as can be seen in the setting of cellulitis. 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

## 2018-11-08 NOTE — PROGRESS NOTE ADULT - PROBLEM SELECTOR PLAN 5
Cervical and Lumbar Stenosis of the spine for years and is dependent on her wheelchair and walker  Pain control w/ oxycontin 60 mg tablet BID and tylenol PRN, ISTOP: [id number]  Can add hydrocodone 7.5mg TID PRN

## 2018-11-08 NOTE — PROGRESS NOTE ADULT - PROBLEM SELECTOR PLAN 2
Will give an extra 20mg lasix today, at home alternates between 20/40mg  Will repeat K later today Will give an extra 20mg lasix today, at home alternates between 20/40mg  Will repeat K later today  low K diet

## 2018-11-08 NOTE — DISCHARGE NOTE ADULT - PLAN OF CARE
pain control Follow up in rehab and podiarty sfter discharge  return to hospital if worsens  fall precautions take meds as prescribed follow up in rehab HA1C<7 your HA1C was 7.2  continue meds and follow up blood glucose in rehab Follow up in rehab and follow up with podiatry  within 1 week  after discharge.  return to hospital if worsens  fall precautions

## 2018-11-08 NOTE — DISCHARGE NOTE ADULT - HOSPITAL COURSE
63y Female with a PMH of IDDM, HTN, CKD, morbid obesity, L DVT (2006), HLD, cervical and lumbar stenosis a/w acute on chronic R foot pain s/p MRI - appears to be due to progression of charcot arthropathy in mid and hind foot. d/w ID - unlikely infectious, No antibiotics, per podiatry, surgery unlikely to help pain/symptoms, limited options.  WBAT to b/l LEs. Fractures are chronic in nature and is 2/2 charcot. POD consulted,   Not a candidate for charcot recon.  Explained that fixing the foot will not relieve the symptoms she is experiencing. pain control. Discharge to rehab. 63 y0  Female with a PMH of IDDM, HTN, CKD, morbid obesity, L DVT (2006), HLD, cervical and lumbar stenosis a/w acute on chronic R foot pain s/p MRI - appears to be due to progression of charcot arthropathy in mid and hind foot. d/w ID - unlikely infectious, No antibiotics, per podiatry, surgery unlikely to help pain/symptoms, limited options.  WBAT to b/l LEs. Fractures are chronic in nature and is 2/2 charcot. POD consulted,   Not a candidate for charcot recon.  Explained that fixing the foot will not relieve the symptoms she is experiencing. pain control. Discharge to rehab.

## 2018-11-08 NOTE — PROGRESS NOTE ADULT - ASSESSMENT
63y Female with a PMH of IDDM, HTN, CKD, morbid obesity, L DVT (2006), HLD, cervical and lumbar stenosis a/w acute on chronic R foot pain s/p MRI - appears to be due to progression of charcot arthropathy in mid and hind foot. d/w ID - unlikely infectious, hold off on antibiotics, per podiatry, surgery unlikely to help pain/symptoms, limited options. Await PT eval.

## 2018-11-08 NOTE — DISCHARGE NOTE ADULT - CARE PROVIDERS DIRECT ADDRESSES
,[email or web address]

## 2018-11-08 NOTE — PROGRESS NOTE ADULT - PROBLEM SELECTOR PLAN 1
R leg pain most likely due advanced charcot arthropathy  -MRI w/ severe and progression of charcot arthropathy in mid and hind foot  -hold off tx, ID input appreciated likely chronic changes and not due to infection  Podiatry eval pending  Per podiatry WBAT to b/l LEs  Fractures are chronic in nature and is 2/2 charcot  Not a candidate for charcot recon.  Explained that fixing the foot will not relieve the symptoms she is experiencing.  Patient in agreement.    Wound rec ortho spine c/s   No other acute podiatric issues  -c/w same home pain control w/ oxycontin 60 mg tablet BID and tylenol PRN and oxycodone 5mg PRN. For severe or breakthrough pain  ISTOP: [id number]

## 2018-11-08 NOTE — PROGRESS NOTE ADULT - ASSESSMENT
64 y/o female with charcot R Foot   ·	pt seen and evaluated   ·	MRI and Tib/Fib xray reviewed, XR foot reviewed   ·	Etiology of leg pain may be 2/2 spinal stenosis given her hx   ·	WBAT to b/l LEs  ·	Fractures are chronic in nature and is 2/2 charcot  ·	Not a candidate for charcot recon.  Explained that fixing the foot will not relieve the symptoms she is experiencing.  Patient in agreement.    ·	Wound rec ortho spine c/s   ·	No other acute podiatric issues

## 2018-11-08 NOTE — DISCHARGE NOTE ADULT - ADDITIONAL INSTRUCTIONS
Follow up with Dr. Osvaldo JACKSON within 1 week of discharge.  Call for appointment   [place] office: [phone]  [place] office: [phone]

## 2018-11-08 NOTE — PROGRESS NOTE ADULT - SUBJECTIVE AND OBJECTIVE BOX
infectious diseases progress note:    Patient is a 63y old  Female who presents with a chief complaint of R leg pain (08 Nov 2018 07:31)        Pain of right lower extremity        ROS:  CONSTITUTIONAL:  Negative fever or chills, feels well, good appetite  EYES:  Negative  blurry vision or double vision  CARDIOVASCULAR:  Negative for chest pain or palpitations  RESPIRATORY:  Negative for cough, wheezing, or SOB   GASTROINTESTINAL:  Negative for nausea, vomiting, diarrhea, constipation, or abdominal pain  GENITOURINARY:  Negative frequency, urgency or dysuria  NEUROLOGIC:  No headache, confusion, dizziness, lightheadedness    Allergies    adhesives (Rash)  latex (Rash)  No Known Drug Allergies  wool- rash, itch (Other)    Intolerances        ANTIBIOTICS/RELEVANT:  antimicrobials    immunologic:  influenza   Vaccine 0.5 milliLiter(s) IntraMuscular once    OTHER:  acetaminophen   Tablet .. 650 milliGRAM(s) Oral every 6 hours PRN  ALBUTerol    90 MICROgram(s) HFA Inhaler 2 Puff(s) Inhalation every 6 hours PRN  ALBUTerol/ipratropium for Nebulization 3 milliLiter(s) Nebulizer every 6 hours PRN  aspirin  chewable 81 milliGRAM(s) Oral daily  clonazePAM Tablet 0.5 milliGRAM(s) Oral at bedtime  dextrose 40% Gel 15 Gram(s) Oral once PRN  dextrose 5%. 1000 milliLiter(s) IV Continuous <Continuous>  dextrose 50% Injectable 12.5 Gram(s) IV Push once  dextrose 50% Injectable 25 Gram(s) IV Push once  dextrose 50% Injectable 25 Gram(s) IV Push once  furosemide    Tablet 20 milliGRAM(s) Oral daily  glucagon  Injectable 1 milliGRAM(s) IntraMuscular once PRN  heparin  Injectable 5000 Unit(s) SubCutaneous every 8 hours  insulin glargine Injectable (LANTUS) 20 Unit(s) SubCutaneous at bedtime  insulin lispro (HumaLOG) corrective regimen sliding scale   SubCutaneous at bedtime  insulin lispro (HumaLOG) corrective regimen sliding scale   SubCutaneous three times a day before meals  insulin lispro Injectable (HumaLOG) 5 Unit(s) SubCutaneous three times a day before meals  lisinopril 5 milliGRAM(s) Oral daily  oxyCODONE    IR 5 milliGRAM(s) Oral every 4 hours PRN  oxyCODONE  ER Tablet 60 milliGRAM(s) Oral every 12 hours  polyethylene glycol 3350 17 Gram(s) Oral two times a day  simvastatin 40 milliGRAM(s) Oral at bedtime      Objective:  Vital Signs Last 24 Hrs  T(C): 36.7 (08 Nov 2018 05:55), Max: 36.8 (07 Nov 2018 16:26)  T(F): 98.1 (08 Nov 2018 05:55), Max: 98.3 (07 Nov 2018 16:26)  HR: 78 (08 Nov 2018 05:55) (72 - 78)  BP: 146/69 (08 Nov 2018 05:55) (120/69 - 146/69)  BP(mean): --  RR: 18 (08 Nov 2018 05:55) (18 - 18)  SpO2: 92% (08 Nov 2018 05:55) (92% - 97%)    PHYSICAL EXAM:     Eyes:DANIELITO, EOMI  Ear/Nose/Throat: no oral lesion, no sinus tenderness on percussion	  Neck:no JVD, no lymphadenopathy, supple  Respiratory: CTA param  Cardiovascular: S1S2 RRR, no murmurs  Gastrointestinal:soft, (+) BS, no HSM  Extremities no ulcers  chronic changes         LABS:                        13.7   9.73  )-----------( 239      ( 07 Nov 2018 08:07 )             43.5     11-07    144  |  105  |  27<H>  ----------------------------<  130<H>  4.9   |  28  |  1.78<H>    Ca    9.4      07 Nov 2018 07:08              MICROBIOLOGY:    RECENT CULTURES:        RESPIRATORY CULTURES:              RADIOLOGY & ADDITIONAL STUDIES:        Pager [phone]  After 5 pm/weekends or if no response :[phone]

## 2018-11-08 NOTE — DISCHARGE NOTE ADULT - CARE PLAN
Principal Discharge DX:	Foot pain, right  Goal:	pain control  Assessment and plan of treatment:	Follow up in rehab and podiarty sfter discharge  return to hospital if worsens  fall precautions  Secondary Diagnosis:	Essential hypertension  Assessment and plan of treatment:	take meds as prescribed follow up in rehab  Secondary Diagnosis:	Diabetes mellitus type 2, insulin dependent  Goal:	HA1C<7  Assessment and plan of treatment:	your HA1C was 7.2  continue meds and follow up blood glucose in rehab  Secondary Diagnosis:	Essential hypertension  Assessment and plan of treatment:	take meds as prescribed follow up in rehab Principal Discharge DX:	Foot pain, right  Goal:	pain control  Assessment and plan of treatment:	Follow up in rehab and follow up with podiatry  within 1 week  after discharge.  return to hospital if worsens  fall precautions  Secondary Diagnosis:	Essential hypertension  Assessment and plan of treatment:	take meds as prescribed follow up in rehab  Secondary Diagnosis:	Diabetes mellitus type 2, insulin dependent  Goal:	HA1C<7  Assessment and plan of treatment:	your HA1C was 7.2  continue meds and follow up blood glucose in rehab  Secondary Diagnosis:	Essential hypertension  Assessment and plan of treatment:	take meds as prescribed follow up in rehab

## 2018-11-08 NOTE — PROGRESS NOTE ADULT - ASSESSMENT
63 year old with DM, CKD< hs of DVY with recent workup for pain in right lower leg around the calf.  doppler studies suggest small vessel disease: and she does have pulses on exam  no open wds. MRI reviewed severe Charcot's joint of the right foot.  The lac of an open ulcer makes it very unlikely that these changes are the result of infection.      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   work up underway   discussed with pt

## 2018-11-08 NOTE — PROGRESS NOTE ADULT - SUBJECTIVE AND OBJECTIVE BOX
Patient is a 63y old  Female who presents with a chief complaint of R leg pain (07 Nov 2018 09:11)      HPI:  Patient is a 63y Female with a PMH of IDDM (A1c 8.2% on 8/2018), HTN, CKD, morbid obesity, provoked L DVT (2006), HLD, cervical and lumbar stenosis p/w R leg pain for the last 2 weeks. It has been worsening to the point that she isn't able to ambulate with her walker anymore and has has been using a wheelchair in order to get around. She reports that in the posterior calf, increased warmth and pain radiates to her feet and that she can't put any pressure on her foot. She has had 4 prior fractures on her R foot but last fracture was 5 years ago and no surgeries on it. Has noticed gradual skin changes bilaterally on her lower extremities. She had gotten workup outpt and was shown to have small vessel disease in her feet but no doppler abnormalities is of low, VINOD 1.26 (R) and 1.22 (L) in the legs and noncompressible. She denies any recent infections, fevers, nausea, vomiting, diarrhea, CP, SOB.    In the ED Vitals signs were:  T(C): 36.7 (11-05-18 @ 23:53), Max: 36.7 (11-05-18 @ 19:20)  HR: 74 (11-05-18 @ 23:53) (72 - 108)  BP: 118/69 (11-05-18 @ 23:53) (118/69 - 143/63)  RR: 20 (11-05-18 @ 23:53) (20 - 20)  SpO2: 94% (11-05-18 @ 23:53) (94% - 98%)  Given in th ED: IV Dilaudid 1mg x1, morphine 5mg x1 , oxycodone 5mg x1 (06 Nov 2018 02:37)      PAST MEDICAL & SURGICAL HISTORY:  Other fecal abnormalities: +iFOBT  Narrow angle glaucoma of left eye  CKD (chronic kidney disease)  Insomnia  Edema of lower extremity: on lasix  Vitamin D deficiency  Morbid Obesity  Cataract  Dyslipidemia  Deep Vein Thrombosis (DVT): Left leg, 2004, treated and resolved  Spinal Stenosis, Lumbar  Cervical Stenosis of Spine  Endometrial Hyperplasia  HTN (Hypertension)  Diabetes Mellitus Type II  H/O colonoscopy: 1998  H/O laser iridotomy: left eye, 2016  Endometrial biopsy 12/02/09  Tonsillectomy as a child  Cervical Spinal Stenosis surgery x2 (01/2002, 7/2002)  D&C 2008: hysteroscopy, endometrial hyperplasia, 2009  D&C x2 1980's  Cholecystectomy/appendectomy @ age 25  C Section 1994  Cataract extracted with lens implant 1998: Right      MEDICATIONS  (STANDING):  aspirin  chewable 81 milliGRAM(s) Oral daily  bisacodyl Suppository 10 milliGRAM(s) Rectal once  clonazePAM Tablet 0.5 milliGRAM(s) Oral at bedtime  dextrose 5%. 1000 milliLiter(s) (50 mL/Hr) IV Continuous <Continuous>  dextrose 50% Injectable 12.5 Gram(s) IV Push once  dextrose 50% Injectable 25 Gram(s) IV Push once  dextrose 50% Injectable 25 Gram(s) IV Push once  furosemide    Tablet 20 milliGRAM(s) Oral daily  heparin  Injectable 5000 Unit(s) SubCutaneous every 8 hours  influenza   Vaccine 0.5 milliLiter(s) IntraMuscular once  insulin glargine Injectable (LANTUS) 20 Unit(s) SubCutaneous at bedtime  insulin lispro (HumaLOG) corrective regimen sliding scale   SubCutaneous at bedtime  insulin lispro (HumaLOG) corrective regimen sliding scale   SubCutaneous three times a day before meals  insulin lispro Injectable (HumaLOG) 5 Unit(s) SubCutaneous three times a day before meals  lisinopril 5 milliGRAM(s) Oral daily  oxyCODONE  ER Tablet 60 milliGRAM(s) Oral every 12 hours  polyethylene glycol 3350 17 Gram(s) Oral two times a day  simvastatin 40 milliGRAM(s) Oral at bedtime    MEDICATIONS  (PRN):  acetaminophen   Tablet .. 650 milliGRAM(s) Oral every 6 hours PRN Mild Pain (1 - 3), Moderate Pain (4 - 6)  ALBUTerol    90 MICROgram(s) HFA Inhaler 2 Puff(s) Inhalation every 6 hours PRN Shortness of Breath and/or Wheezing  ALBUTerol/ipratropium for Nebulization 3 milliLiter(s) Nebulizer every 6 hours PRN Wheezing  dextrose 40% Gel 15 Gram(s) Oral once PRN Blood Glucose LESS THAN 70 milliGRAM(s)/deciliter  glucagon  Injectable 1 milliGRAM(s) IntraMuscular once PRN Glucose LESS THAN 70 milligrams/deciliter  oxyCODONE    IR 5 milliGRAM(s) Oral every 4 hours PRN Moderate Pain (4 - 6)      Allergies    adhesives (Rash)  latex (Rash)  No Known Drug Allergies  wool- rash, itch (Other)    Intolerances        VITALS:    Vital Signs Last 24 Hrs  T(C): 36.7 (07 Nov 2018 04:46), Max: 37.1 (06 Nov 2018 14:38)  T(F): 98 (07 Nov 2018 04:46), Max: 98.7 (06 Nov 2018 14:38)  HR: 72 (07 Nov 2018 04:46) (72 - 77)  BP: 127/60 (07 Nov 2018 04:46) (106/64 - 137/55)  BP(mean): --  RR: 18 (07 Nov 2018 04:46) (18 - 18)  SpO2: 96% (07 Nov 2018 04:46) (93% - 96%)    LABS:                          13.7   9.73  )-----------( 239      ( 07 Nov 2018 08:07 )             43.5       11-07    144  |  105  |  27<H>  ----------------------------<  130<H>  4.9   |  28  |  1.78<H>    Ca    9.4      07 Nov 2018 07:08        CAPILLARY BLOOD GLUCOSE      POCT Blood Glucose.: 145 mg/dL (07 Nov 2018 08:52)  POCT Blood Glucose.: 165 mg/dL (06 Nov 2018 22:33)  POCT Blood Glucose.: 134 mg/dL (06 Nov 2018 18:18)  POCT Blood Glucose.: 79 mg/dL (06 Nov 2018 12:58)      PT/INR - ( 05 Nov 2018 19:21 )   PT: 10.8 sec;   INR: 0.95 ratio         PTT - ( 05 Nov 2018 19:21 )  PTT:29.0 sec    LOWER EXTREMITY PHYSICAL EXAM:    Vasular: DP/PT 2/4, B/L, CFT <3 seconds B/L, Temperature gradient wnl, B/L.   Neuro: Epicritic sensation dimnished to the level of foot, B/L.  Musculoskeletal/Ortho: midfoot collapse R foot, diffuse swelling of b/l LE, no pain on palpation of b/l foot   Skin: no openings, no acute signs of infection, hyperpigmentation anterior shin b/l       RADIOLOGY & ADDITIONAL STUDIES:    < from: Xray Tibia + Fibula 2 Views, Right (11.05.18 @ 19:48) >    EXAM:  TIBIA AND FIBULA AP AND LAT RT                            PROCEDURE DATE:  11/05/2018            INTERPRETATION:  CLINICAL INDICATION: Right-sided swelling and pain for   one week    TECHNIQUE: Views of the right tibia/fibula    COMPARISON: No prior radiographs of the right tibia/fibula. Radiographs   of the right foot 12 November 2011.    Impression:    There is mild nonspecific cortical thickening/periosteal reaction at the   proximal tibial shaft medially and around the proximal to mid fibular   shaft which are age-indeterminate. There is diffuse soft tissue swelling.   There is marked arthrosis at the partially imaged right hindfoot   consistent with a Charcot arthropathy as seen previously. There are   chronic calcifications at the lower leg anteriorly. If there is   persistent clinical concern for osteomyelitis in any of these regions,   MRI can be performed for further evaluation.                  SHANELL TOLEDO M.D., RADIOLOGY RESIDENT  This document has been electronically signed.  CHINO SOSA M.D., ATTENDING RADIOLOGIST  This document has been electronically signed. Nov 6 2018 12:43PM        < end of copied text >    < from: MR Ankle w/wo IV Cont, Right (11.06.18 @ 14:03) >  INTERPRETATION:  MR ANKLE WAW IC RT dated 11/6/2018 2:03 PM     INDICATION: Right ankle pain and swelling. Pain and swelling extending   into the calf.    COMPARISON: Lower extremity Doppler dated 11/5/2018. Foot radiographs   dated 7/14/2016.    TECHNIQUE: Multi-sequential, multiplanar MRI imaging of the ankle and   hindfoot was performed per standard protocol. Images were obtained before   and after the administration of IV contrast.  Contrast: Gadavist. Administered: 10 cc. Discarded: 0 cc.    FINDINGS:    BONE MARROW: There is severe hindfoot and midfoot Charcot arthropathy   with erosive changes involving the calcaneocuboid joint. There is   destruction and degeneration of the talus and navicula. There are erosive   changes along the proximal cuneiforms. Debris is seen along the dorsal   aspect of the midfoot with several osseous bodies. There is intermediate   signal on T1-weighted imaging within the residual cuboid and distal   calcaneus. Erosive changes and intermediate signal within the   sustentaculum yohannes. There is narrowing and fibrillation of cartilage in   the tibiotalar joint. Suspect several small loose bodies posterior to the  tibiotalar joint. There is thinning of the cartilage in the posterior   subtalar joint.  SYNOVIUM/ JOINT FLUID: There is a moderate tibiotalar joint effusion.   There is nonspecific fluid seen throughout the midfoot. There is diffuse   synovial enhancement within the midfoot.  TENDONS: The visualized portions of the tendons are intact. No   full-thickness tear or retraction is seen.  MUSCLES: There is marked atrophy of the intrinsic musculature the foot   and of the calf musculature. There is nonspecific edema within the calf   musculature.  NEUROVASCULAR STRUCTURES: There is edema surrounding the sural and tibial   neurovascular bundles.  SUBCUTANEOUS SOFT TISSUES: There is moderate circumferential lower calf   soft tissue swelling extending into the ankle and forefoot. No drainable   fluid collection is seen.    IMPRESSION:    1.  Severe Charcot arthropathy of the hindfoot and midfoot progressed   compared to prior radiographs. Given the worsening erosive change and   fluid/synovitis surrounding the tarsal articulations, superimposed acute   infection is not excluded.  2.  Nonspecific soft tissue swelling about the ankle and foot as can be   seen in the setting of cellulitis. Correlate clinically.  3.  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                    GABBY FONG M.D., ATTENDING RADIOLOGIST  This document has been electronically signed. Nov 6 2018  3:34PM    < end of copied text >

## 2018-11-08 NOTE — PHYSICAL THERAPY INITIAL EVALUATION ADULT - ADDITIONAL COMMENTS
pt lives in a house with her 2 sons, one of which has special needs and she cares for. prior to admission she was amb short distances with a RW. she also owns a power w/c and shower chair

## 2018-11-08 NOTE — PROGRESS NOTE ADULT - PROBLEM SELECTOR PLAN 6
On home 74U and 70U qhs Humulin and Novolog 5U premeals, 8.2% on 8/2018  c/w 20units of lantus, increase premeal from 5 to 8 TID with meals  -c/w moderate ISS and moderate qhs

## 2018-11-09 VITALS
HEART RATE: 78 BPM | TEMPERATURE: 98 F | RESPIRATION RATE: 18 BRPM | DIASTOLIC BLOOD PRESSURE: 67 MMHG | OXYGEN SATURATION: 98 % | SYSTOLIC BLOOD PRESSURE: 118 MMHG

## 2018-11-09 LAB
ANION GAP SERPL CALC-SCNC: 16 MMOL/L — SIGNIFICANT CHANGE UP (ref 5–17)
BUN SERPL-MCNC: 32 MG/DL — HIGH (ref 7–23)
CALCIUM SERPL-MCNC: 9.8 MG/DL — SIGNIFICANT CHANGE UP (ref 8.4–10.5)
CHLORIDE SERPL-SCNC: 100 MMOL/L — SIGNIFICANT CHANGE UP (ref 96–108)
CO2 SERPL-SCNC: 27 MMOL/L — SIGNIFICANT CHANGE UP (ref 22–31)
CREAT SERPL-MCNC: 1.74 MG/DL — HIGH (ref 0.5–1.3)
GLUCOSE BLDC GLUCOMTR-MCNC: 253 MG/DL — HIGH (ref 70–99)
GLUCOSE BLDC GLUCOMTR-MCNC: 270 MG/DL — HIGH (ref 70–99)
GLUCOSE SERPL-MCNC: 282 MG/DL — HIGH (ref 70–99)
HCT VFR BLD CALC: 44.5 % — SIGNIFICANT CHANGE UP (ref 34.5–45)
HGB BLD-MCNC: 14.5 G/DL — SIGNIFICANT CHANGE UP (ref 11.5–15.5)
MAGNESIUM SERPL-MCNC: 1.9 MG/DL — SIGNIFICANT CHANGE UP (ref 1.6–2.6)
MCHC RBC-ENTMCNC: 29.6 PG — SIGNIFICANT CHANGE UP (ref 27–34)
MCHC RBC-ENTMCNC: 32.5 GM/DL — SIGNIFICANT CHANGE UP (ref 32–36)
MCV RBC AUTO: 90.9 FL — SIGNIFICANT CHANGE UP (ref 80–100)
PHOSPHATE SERPL-MCNC: 3.8 MG/DL — SIGNIFICANT CHANGE UP (ref 2.5–4.5)
PLATELET # BLD AUTO: 196 K/UL — SIGNIFICANT CHANGE UP (ref 150–400)
POTASSIUM SERPL-MCNC: 4.3 MMOL/L — SIGNIFICANT CHANGE UP (ref 3.5–5.3)
POTASSIUM SERPL-SCNC: 4.3 MMOL/L — SIGNIFICANT CHANGE UP (ref 3.5–5.3)
RBC # BLD: 4.9 M/UL — SIGNIFICANT CHANGE UP (ref 3.8–5.2)
RBC # FLD: 13.5 % — SIGNIFICANT CHANGE UP (ref 10.3–14.5)
SODIUM SERPL-SCNC: 143 MMOL/L — SIGNIFICANT CHANGE UP (ref 135–145)
WBC # BLD: 8.3 K/UL — SIGNIFICANT CHANGE UP (ref 3.8–10.5)
WBC # FLD AUTO: 8.3 K/UL — SIGNIFICANT CHANGE UP (ref 3.8–10.5)

## 2018-11-09 PROCEDURE — 97116 GAIT TRAINING THERAPY: CPT

## 2018-11-09 PROCEDURE — 73723 MRI JOINT LWR EXTR W/O&W/DYE: CPT

## 2018-11-09 PROCEDURE — 97530 THERAPEUTIC ACTIVITIES: CPT

## 2018-11-09 PROCEDURE — 93005 ELECTROCARDIOGRAM TRACING: CPT

## 2018-11-09 PROCEDURE — 99285 EMERGENCY DEPT VISIT HI MDM: CPT

## 2018-11-09 PROCEDURE — 80048 BASIC METABOLIC PNL TOTAL CA: CPT

## 2018-11-09 PROCEDURE — 83036 HEMOGLOBIN GLYCOSYLATED A1C: CPT

## 2018-11-09 PROCEDURE — 86140 C-REACTIVE PROTEIN: CPT

## 2018-11-09 PROCEDURE — 73590 X-RAY EXAM OF LOWER LEG: CPT

## 2018-11-09 PROCEDURE — 82962 GLUCOSE BLOOD TEST: CPT

## 2018-11-09 PROCEDURE — 85652 RBC SED RATE AUTOMATED: CPT

## 2018-11-09 PROCEDURE — A9585: CPT

## 2018-11-09 PROCEDURE — 97162 PT EVAL MOD COMPLEX 30 MIN: CPT

## 2018-11-09 PROCEDURE — 85027 COMPLETE CBC AUTOMATED: CPT

## 2018-11-09 PROCEDURE — 83735 ASSAY OF MAGNESIUM: CPT

## 2018-11-09 PROCEDURE — 90686 IIV4 VACC NO PRSV 0.5 ML IM: CPT

## 2018-11-09 PROCEDURE — 85610 PROTHROMBIN TIME: CPT

## 2018-11-09 PROCEDURE — 93971 EXTREMITY STUDY: CPT

## 2018-11-09 PROCEDURE — 85730 THROMBOPLASTIN TIME PARTIAL: CPT

## 2018-11-09 PROCEDURE — 94640 AIRWAY INHALATION TREATMENT: CPT

## 2018-11-09 PROCEDURE — 73620 X-RAY EXAM OF FOOT: CPT

## 2018-11-09 PROCEDURE — 84100 ASSAY OF PHOSPHORUS: CPT

## 2018-11-09 RX ORDER — CYCLOBENZAPRINE HYDROCHLORIDE 10 MG/1
1 TABLET, FILM COATED ORAL
Qty: 20 | Refills: 0
Start: 2018-11-09

## 2018-11-09 RX ORDER — INSULIN LISPRO 100/ML
12 VIAL (ML) SUBCUTANEOUS
Qty: 0 | Refills: 0 | Status: DISCONTINUED | OUTPATIENT
Start: 2018-11-09 | End: 2018-11-09

## 2018-11-09 RX ORDER — INSULIN GLARGINE 100 [IU]/ML
25 INJECTION, SOLUTION SUBCUTANEOUS AT BEDTIME
Qty: 0 | Refills: 0 | Status: DISCONTINUED | OUTPATIENT
Start: 2018-11-09 | End: 2018-11-09

## 2018-11-09 RX ORDER — CYCLOBENZAPRINE HYDROCHLORIDE 10 MG/1
10 TABLET, FILM COATED ORAL
Qty: 0 | Refills: 0 | Status: DISCONTINUED | OUTPATIENT
Start: 2018-11-09 | End: 2018-11-09

## 2018-11-09 RX ORDER — FUROSEMIDE 40 MG
40 TABLET ORAL ONCE
Qty: 0 | Refills: 0 | Status: COMPLETED | OUTPATIENT
Start: 2018-11-09 | End: 2018-11-09

## 2018-11-09 RX ORDER — CHOLECALCIFEROL (VITAMIN D3) 125 MCG
1 CAPSULE ORAL
Qty: 0 | Refills: 0 | COMMUNITY

## 2018-11-09 RX ADMIN — LISINOPRIL 5 MILLIGRAM(S): 2.5 TABLET ORAL at 05:46

## 2018-11-09 RX ADMIN — Medication 20 MILLIGRAM(S): at 05:46

## 2018-11-09 RX ADMIN — Medication 8 UNIT(S): at 09:11

## 2018-11-09 RX ADMIN — OXYCODONE HYDROCHLORIDE 5 MILLIGRAM(S): 5 TABLET ORAL at 14:40

## 2018-11-09 RX ADMIN — Medication 6: at 13:00

## 2018-11-09 RX ADMIN — OXYCODONE HYDROCHLORIDE 5 MILLIGRAM(S): 5 TABLET ORAL at 09:00

## 2018-11-09 RX ADMIN — OXYCODONE HYDROCHLORIDE 5 MILLIGRAM(S): 5 TABLET ORAL at 08:20

## 2018-11-09 RX ADMIN — OXYCODONE HYDROCHLORIDE 60 MILLIGRAM(S): 5 TABLET ORAL at 05:45

## 2018-11-09 RX ADMIN — Medication 40 MILLIGRAM(S): at 11:06

## 2018-11-09 RX ADMIN — HEPARIN SODIUM 5000 UNIT(S): 5000 INJECTION INTRAVENOUS; SUBCUTANEOUS at 05:45

## 2018-11-09 RX ADMIN — Medication 6: at 09:11

## 2018-11-09 RX ADMIN — Medication 8 UNIT(S): at 13:01

## 2018-11-09 RX ADMIN — INFLUENZA VIRUS VACCINE 0.5 MILLILITER(S): 15; 15; 15; 15 SUSPENSION INTRAMUSCULAR at 13:40

## 2018-11-09 RX ADMIN — OXYCODONE HYDROCHLORIDE 5 MILLIGRAM(S): 5 TABLET ORAL at 15:49

## 2018-11-09 RX ADMIN — OXYCODONE HYDROCHLORIDE 60 MILLIGRAM(S): 5 TABLET ORAL at 06:43

## 2018-11-09 RX ADMIN — Medication 3 MILLILITER(S): at 11:06

## 2018-11-09 RX ADMIN — HEPARIN SODIUM 5000 UNIT(S): 5000 INJECTION INTRAVENOUS; SUBCUTANEOUS at 13:03

## 2018-11-09 RX ADMIN — Medication 81 MILLIGRAM(S): at 13:03

## 2018-11-09 RX ADMIN — POLYETHYLENE GLYCOL 3350 17 GRAM(S): 17 POWDER, FOR SOLUTION ORAL at 05:46

## 2018-11-09 NOTE — PROGRESS NOTE ADULT - SUBJECTIVE AND OBJECTIVE BOX
Patient is a 63y old  Female who presents with a chief complaint of R leg pain (08 Nov 2018 16:17)      SUBJECTIVE / OVERNIGHT EVENTS: Patient seen and examined at bedside - patient feels ok, she is frustrated by her condition and understands how serious her R foot condition is. She is agreeable to [facility].    ROS:  All other review of systems negative    Allergies    adhesives (Rash)  latex (Rash)  No Known Drug Allergies  wool- rash, itch (Other)    Intolerances        MEDICATIONS  (STANDING):  aspirin  chewable 81 milliGRAM(s) Oral daily  clonazePAM Tablet 0.5 milliGRAM(s) Oral at bedtime  dextrose 5%. 1000 milliLiter(s) (50 mL/Hr) IV Continuous <Continuous>  dextrose 50% Injectable 12.5 Gram(s) IV Push once  dextrose 50% Injectable 25 Gram(s) IV Push once  dextrose 50% Injectable 25 Gram(s) IV Push once  furosemide    Tablet 20 milliGRAM(s) Oral daily  heparin  Injectable 5000 Unit(s) SubCutaneous every 8 hours  influenza   Vaccine 0.5 milliLiter(s) IntraMuscular once  insulin glargine Injectable (LANTUS) 20 Unit(s) SubCutaneous at bedtime  insulin lispro (HumaLOG) corrective regimen sliding scale   SubCutaneous at bedtime  insulin lispro (HumaLOG) corrective regimen sliding scale   SubCutaneous three times a day before meals  insulin lispro Injectable (HumaLOG) 8 Unit(s) SubCutaneous three times a day before meals  lisinopril 5 milliGRAM(s) Oral daily  oxyCODONE  ER Tablet 60 milliGRAM(s) Oral every 12 hours  polyethylene glycol 3350 17 Gram(s) Oral two times a day  simvastatin 40 milliGRAM(s) Oral at bedtime    MEDICATIONS  (PRN):  acetaminophen   Tablet .. 650 milliGRAM(s) Oral every 6 hours PRN Mild Pain (1 - 3), Moderate Pain (4 - 6)  ALBUTerol    90 MICROgram(s) HFA Inhaler 2 Puff(s) Inhalation every 6 hours PRN Shortness of Breath and/or Wheezing  ALBUTerol/ipratropium for Nebulization 3 milliLiter(s) Nebulizer every 6 hours PRN Wheezing  dextrose 40% Gel 15 Gram(s) Oral once PRN Blood Glucose LESS THAN 70 milliGRAM(s)/deciliter  glucagon  Injectable 1 milliGRAM(s) IntraMuscular once PRN Glucose LESS THAN 70 milligrams/deciliter  oxyCODONE    IR 5 milliGRAM(s) Oral every 4 hours PRN Moderate Pain (4 - 6)      Vital Signs Last 24 Hrs  T(C): 36.8 (09 Nov 2018 05:36), Max: 36.8 (08 Nov 2018 20:28)  T(F): 98.2 (09 Nov 2018 05:36), Max: 98.2 (08 Nov 2018 20:28)  HR: 83 (09 Nov 2018 05:36) (74 - 83)  BP: 135/80 (09 Nov 2018 05:36) (129/65 - 135/80)  BP(mean): --  RR: 18 (09 Nov 2018 05:36) (18 - 19)  SpO2: 96% (09 Nov 2018 05:36) (94% - 97%)  CAPILLARY BLOOD GLUCOSE      POCT Blood Glucose.: 257 mg/dL (08 Nov 2018 22:07)  POCT Blood Glucose.: 212 mg/dL (08 Nov 2018 17:05)  POCT Blood Glucose.: 270 mg/dL (08 Nov 2018 12:26)  POCT Blood Glucose.: 177 mg/dL (08 Nov 2018 08:56)    I&O's Summary    08 Nov 2018 07:01  -  09 Nov 2018 07:00  --------------------------------------------------------  IN: 360 mL / OUT: 850 mL / NET: -490 mL        PHYSICAL EXAM:  GENERAL: NAD, obese  HEAD:  Atraumatic, Normocephalic  EYES: EOMI, PERRLA, conjunctiva and sclera clear  NECK: Supple, No JVD  CHEST/LUNG: Clear to auscultation bilaterally; No wheeze  HEART: Regular rate and rhythm; No murmurs, rubs, or gallops  ABDOMEN: Soft, Nontender, Nondistended; Bowel sounds present  EXTREMITIES:  2+ Peripheral Pulses, nonpitting edema b/l, b/l chronic venous stasis  NEUROLOGY: AAOx3, non-focal  PSYCH: calm  SKIN: No rashes or lesions      LABS:                        14.5   8.3   )-----------( 196      ( 09 Nov 2018 06:53 )             44.5     11-09    143  |  100  |  32<H>  ----------------------------<  282<H>  4.3   |  27  |  1.74<H>    Ca    9.8      09 Nov 2018 06:54  Phos  3.8     11-09  Mg     1.9     11-09                RADIOLOGY & ADDITIONAL TESTS:    Imaging Personally Reviewed:    Consultant(s) Notes Reviewed:      Care Discussed with Consultants/Other Providers:    Case Discussed with Family:    Goals of Care:

## 2018-11-09 NOTE — PROGRESS NOTE ADULT - PROBLEM SELECTOR PLAN 2
Will give an extra 20mg lasix today, at home alternates between 20/40mg  Will repeat K later today  low K diet

## 2018-11-09 NOTE — PROGRESS NOTE ADULT - PROBLEM SELECTOR PLAN 5
Cervical and Lumbar Stenosis of the spine for years and is dependent on her wheelchair and walker  Pain control w/ oxycontin 60 mg tablet BID and tylenol PRN, ISTOP: [id number]  Can add hydrocodone 7.5mg TID PRN

## 2018-11-09 NOTE — PROGRESS NOTE ADULT - PROBLEM SELECTOR PLAN 1
R leg pain most likely due advanced charcot arthropathy  -MRI w/ severe and progression of charcot arthropathy in mid and hind foot  -hold off tx, ID input appreciated likely chronic changes and not due to infection  Podiatry eval pending  Per podiatry WBAT to b/l LEs  Fractures are chronic in nature and is 2/2 charcot  Not a candidate for charcot recon.  Explained that fixing the foot will not relieve the symptoms she is experiencing.  Patient in agreement.    Wound rec ortho spine c/s   No other acute podiatric issues  -c/w same home pain control w/ oxycontin 60 mg tablet BID and tylenol PRN and oxycodone 5mg PRN. For severe or breakthrough pain  ISTOP: [id number]

## 2018-11-09 NOTE — PROGRESS NOTE ADULT - ASSESSMENT
63y Female with a PMH of IDDM, HTN, CKD, morbid obesity, L DVT (2006), HLD, cervical and lumbar stenosis a/w acute on chronic R foot pain s/p MRI - appears to be due to progression of charcot arthropathy in mid and hind foot. d/w ID - unlikely infectious, hold off on antibiotics, per podiatry, surgery unlikely to help pain/symptoms, limited options. PT recommending [facility], patient agreeable, await bed at [facility]

## 2018-11-09 NOTE — PROGRESS NOTE ADULT - ASSESSMENT
62 y/o female with charcot R Foot   ·	MRI and Tib/Fib xray reviewed, XR foot reviewed   ·	Etiology of leg pain may be 2/2 spinal stenosis given her hx   ·	WBAT to b/l LEs in sneakers.  No need for surgical shoes.   ·	Fractures are chronic in nature and is 2/2 charcot  ·	Not a candidate for charcot recon.  Explained that fixing the foot will not relieve the symptoms she is experiencing.  Patient in agreement.    ·	Wound rec ortho spine c/s   ·	No other acute podiatric issues  ·	Podiatrically stable for DC    Follow up with Dr. Osvaldo JACKSON within 1 week of discharge.  Call for appointment   [place] office: [phone]  [place] office: [phone]

## 2018-11-10 NOTE — DISCUSSION/SUMMARY
[FreeTextEntry1] : The patient was admitted with worsening erythema bilaterally of legs and inability to weight-bear on the right lower extremity.  ID evaluated the patient and felt this was not infectious.  She is having difficulty ambulating secondary to pain on the right and was diagnosed with severe Charcot arthropathy.  She is not a candidate for reconstruction and podiatry was in and evaluated her.  She was advised that she should be DC'd to rehab but she has a disabled child at home and refused rehab admission even though her bed had been obtained for her.  Patient was discharged on 11/9/18.

## 2018-11-13 ENCOUNTER — RX RENEWAL (OUTPATIENT)
Age: 63
End: 2018-11-13

## 2018-11-15 DIAGNOSIS — Z56.0 UNEMPLOYMENT, UNSPECIFIED: ICD-10-CM

## 2018-11-15 SDOH — ECONOMIC STABILITY - INCOME SECURITY: UNEMPLOYMENT, UNSPECIFIED: Z56.0

## 2018-11-16 ENCOUNTER — APPOINTMENT (OUTPATIENT)
Dept: INTERNAL MEDICINE | Facility: CLINIC | Age: 63
End: 2018-11-16
Payer: MEDICARE

## 2018-11-16 VITALS — DIASTOLIC BLOOD PRESSURE: 70 MMHG | OXYGEN SATURATION: 92 % | SYSTOLIC BLOOD PRESSURE: 136 MMHG | HEART RATE: 119 BPM

## 2018-11-16 LAB — GLUCOSE BLDC GLUCOMTR-MCNC: 119

## 2018-11-16 PROCEDURE — 99214 OFFICE O/P EST MOD 30 MIN: CPT | Mod: 25

## 2018-11-16 PROCEDURE — 82962 GLUCOSE BLOOD TEST: CPT

## 2018-11-19 ENCOUNTER — RX RENEWAL (OUTPATIENT)
Age: 63
End: 2018-11-19

## 2018-11-26 PROBLEM — Z56.0 UNEMPLOYED: Status: ACTIVE | Noted: 2018-11-26

## 2018-11-29 ENCOUNTER — RX RENEWAL (OUTPATIENT)
Age: 63
End: 2018-11-29

## 2018-11-29 RX ORDER — FLUCONAZOLE 150 MG/1
150 TABLET ORAL DAILY
Qty: 2 | Refills: 0 | Status: DISCONTINUED | COMMUNITY
Start: 2017-04-11 | End: 2018-11-29

## 2018-12-03 NOTE — ASSESSMENT
[FreeTextEntry1] : \par increase oxycontin to 80 mg bid\par D/C flexeril (on clonazapam and narcotic, do not want to add another sedating drug).\par Care manager to look into home podiatry evaluation and home house calls\par electric bed

## 2018-12-03 NOTE — HISTORY OF PRESENT ILLNESS
[de-identified] : Yanet comes in today for follow-up after being hospitalized for severe right foot pain.  Developed it acutely and was not able to walk and was admitted to the hospital.  It was ultimately determined that she has a severe Charcot deformity that is likely the cause of her difficulty ambulating.  It was advised that she be DC'd to rehab but she refused to go because she has to take care of her disabled son.  She is finding it exceedingly difficult to put weight on her foot.  Her hemoglobin A1c was found to be 7.2 in the hospital.  Denies any recent episodes of hypoglycemia.

## 2018-12-11 ENCOUNTER — RX RENEWAL (OUTPATIENT)
Age: 63
End: 2018-12-11

## 2018-12-21 ENCOUNTER — RX RENEWAL (OUTPATIENT)
Age: 63
End: 2018-12-21

## 2019-01-08 ENCOUNTER — RX RENEWAL (OUTPATIENT)
Age: 64
End: 2019-01-08

## 2019-01-23 ENCOUNTER — RX RENEWAL (OUTPATIENT)
Age: 64
End: 2019-01-23

## 2019-02-05 ENCOUNTER — RX RENEWAL (OUTPATIENT)
Age: 64
End: 2019-02-05

## 2019-02-19 ENCOUNTER — RX RENEWAL (OUTPATIENT)
Age: 64
End: 2019-02-19

## 2019-03-06 ENCOUNTER — RX RENEWAL (OUTPATIENT)
Age: 64
End: 2019-03-06

## 2019-03-14 ENCOUNTER — RX RENEWAL (OUTPATIENT)
Age: 64
End: 2019-03-14

## 2019-03-16 ENCOUNTER — RX RENEWAL (OUTPATIENT)
Age: 64
End: 2019-03-16

## 2019-04-17 ENCOUNTER — RX RENEWAL (OUTPATIENT)
Age: 64
End: 2019-04-17

## 2019-04-22 ENCOUNTER — APPOINTMENT (OUTPATIENT)
Dept: INTERNAL MEDICINE | Facility: CLINIC | Age: 64
End: 2019-04-22
Payer: MEDICARE

## 2019-04-22 ENCOUNTER — RESULT CHARGE (OUTPATIENT)
Age: 64
End: 2019-04-22

## 2019-04-22 LAB
GLUCOSE BLDC GLUCOMTR-MCNC: 149
HBA1C MFR BLD HPLC: 6.4

## 2019-04-22 PROCEDURE — 82962 GLUCOSE BLOOD TEST: CPT

## 2019-04-22 PROCEDURE — 99214 OFFICE O/P EST MOD 30 MIN: CPT | Mod: 25

## 2019-04-22 PROCEDURE — 83036 HEMOGLOBIN GLYCOSYLATED A1C: CPT | Mod: QW

## 2019-04-22 NOTE — ASSESSMENT
[FreeTextEntry1] : 1.  Diabetes mellitus -12 is much better than it had been previously.  Encouragement given to continue with her current diet.  She needs an eye exam.  Foot exam no ulcers and podiatry is going to see her in the next week in her home.\par 2.  Chronic pain -no aberrant behavior noted.  Her pain is well controlled on the current regimen and she is using less Vicodin.  We will not make any changes at this time.\par 3.  Hypertension -BP stable.  Continue current management\par 4.  Spinal stenosis and cervical radiculopathy with essentially a left upper and lower hemiparesis.  Uses a wheelchair most of the time.  Would benefit from an electric hospital bed as she is unable to use manual controls.  She will speak with my nurse care manager regarding a letter for this.\par 5.  Return to office in 3 months, CPE at that time.

## 2019-04-22 NOTE — HISTORY OF PRESENT ILLNESS
[de-identified] : Yanet is a 63-year-old female with a history of diabetes, hyperlipidemia, cervical radiculopathy with left upper extremity hemiparesis, wheelchair bound secondary to spinal stenosis and morbid obesity who presents to the office today for follow-up of the same.\par \par The patient is currently on OxyContin and Vicodin.  Pain is stable, back is bad today but is doing well .    No problems getting her pain medications.  Is using less vicoden as the increased dose of oxycontin is helping control the pain.\par \par To get a podiatrist to come into the home starting next week.  She reports that she has had no hypoglycemia did no foot problems.\par \par No complaints of lightheadedness or dizziness on her antihypertensives.\par

## 2019-04-22 NOTE — PHYSICAL EXAM
[No Acute Distress] : no acute distress [Well Nourished] : well nourished [Well Developed] : well developed [Well-Appearing] : well-appearing [Normal Sclera/Conjunctiva] : normal sclera/conjunctiva [EOMI] : extraocular movements intact [No JVD] : no jugular venous distention [No Respiratory Distress] : no respiratory distress  [Clear to Auscultation] : lungs were clear to auscultation bilaterally [Normal Rate] : normal rate  [Regular Rhythm] : with a regular rhythm [Normal S1, S2] : normal S1 and S2 [Soft] : abdomen soft [Non Tender] : non-tender [Right Foot Was Examined] : Right foot ~C was examined [Left Foot Was Examined] : left foot ~C was examined [None] : no ulcers in either foot were found [] : normal [de-identified] : Positive venous stasis changes bilaterally.  Dependent rubor noted, pedal pulses diminished, mild 1-2+ edema [de-identified] : Decreased motor left upper and left lower extremity 4/5 [de-identified] : Tiny bit of breakdown in the left nailbed of the second toe on the right [de-identified] : Decreased sensation

## 2019-04-29 ENCOUNTER — RX RENEWAL (OUTPATIENT)
Age: 64
End: 2019-04-29

## 2019-05-03 ENCOUNTER — RX RENEWAL (OUTPATIENT)
Age: 64
End: 2019-05-03

## 2019-05-07 ENCOUNTER — RX RENEWAL (OUTPATIENT)
Age: 64
End: 2019-05-07

## 2019-05-14 ENCOUNTER — RX RENEWAL (OUTPATIENT)
Age: 64
End: 2019-05-14

## 2019-05-14 LAB
25(OH)D3 SERPL-MCNC: 25.5 NG/ML
ALBUMIN SERPL ELPH-MCNC: 4.1 G/DL
ALP BLD-CCNC: 90 U/L
ALT SERPL-CCNC: 14 U/L
ANION GAP SERPL CALC-SCNC: 13 MMOL/L
AST SERPL-CCNC: 16 U/L
BASOPHILS # BLD AUTO: 0.05 K/UL
BASOPHILS NFR BLD AUTO: 0.5 %
BILIRUB SERPL-MCNC: 0.3 MG/DL
BUN SERPL-MCNC: 28 MG/DL
CALCIUM SERPL-MCNC: 9.5 MG/DL
CHLORIDE SERPL-SCNC: 103 MMOL/L
CHOLEST SERPL-MCNC: 163 MG/DL
CHOLEST/HDLC SERPL: 2.5 RATIO
CO2 SERPL-SCNC: 28 MMOL/L
CREAT SERPL-MCNC: 1.59 MG/DL
EOSINOPHIL # BLD AUTO: 0.22 K/UL
EOSINOPHIL NFR BLD AUTO: 2 %
GLUCOSE SERPL-MCNC: 137 MG/DL
HCT VFR BLD CALC: 44.7 %
HDLC SERPL-MCNC: 65 MG/DL
HGB BLD-MCNC: 13.8 G/DL
IMM GRANULOCYTES NFR BLD AUTO: 0.5 %
LDLC SERPL CALC-MCNC: 60 MG/DL
LYMPHOCYTES # BLD AUTO: 1.76 K/UL
LYMPHOCYTES NFR BLD AUTO: 16.3 %
MAN DIFF?: NORMAL
MCHC RBC-ENTMCNC: 29.7 PG
MCHC RBC-ENTMCNC: 30.9 GM/DL
MCV RBC AUTO: 96.3 FL
MONOCYTES # BLD AUTO: 0.75 K/UL
MONOCYTES NFR BLD AUTO: 6.9 %
NEUTROPHILS # BLD AUTO: 7.99 K/UL
NEUTROPHILS NFR BLD AUTO: 73.8 %
PLATELET # BLD AUTO: 227 K/UL
POTASSIUM SERPL-SCNC: 5 MMOL/L
PROT SERPL-MCNC: 6.3 G/DL
RBC # BLD: 4.64 M/UL
RBC # FLD: 13.1 %
SODIUM SERPL-SCNC: 144 MMOL/L
T4 FREE SERPL-MCNC: 0.8 NG/DL
TRIGL SERPL-MCNC: 189 MG/DL
TSH SERPL-ACNC: 2.57 UIU/ML
WBC # FLD AUTO: 10.82 K/UL

## 2019-05-15 ENCOUNTER — RX RENEWAL (OUTPATIENT)
Age: 64
End: 2019-05-15

## 2019-06-04 ENCOUNTER — RX RENEWAL (OUTPATIENT)
Age: 64
End: 2019-06-04

## 2019-06-13 ENCOUNTER — RX RENEWAL (OUTPATIENT)
Age: 64
End: 2019-06-13

## 2019-07-01 ENCOUNTER — RX RENEWAL (OUTPATIENT)
Age: 64
End: 2019-07-01

## 2019-07-12 ENCOUNTER — RX RENEWAL (OUTPATIENT)
Age: 64
End: 2019-07-12

## 2019-07-12 RX ORDER — CIPROFLOXACIN HYDROCHLORIDE 500 MG/1
500 TABLET, FILM COATED ORAL
Qty: 14 | Refills: 0 | Status: COMPLETED | COMMUNITY
Start: 2019-07-12 | End: 2019-07-19

## 2019-08-08 ENCOUNTER — RX RENEWAL (OUTPATIENT)
Age: 64
End: 2019-08-08

## 2019-08-12 ENCOUNTER — APPOINTMENT (OUTPATIENT)
Dept: INTERNAL MEDICINE | Facility: CLINIC | Age: 64
End: 2019-08-12
Payer: MEDICARE

## 2019-08-12 VITALS
OXYGEN SATURATION: 94 % | SYSTOLIC BLOOD PRESSURE: 125 MMHG | HEART RATE: 73 BPM | HEIGHT: 63 IN | DIASTOLIC BLOOD PRESSURE: 60 MMHG

## 2019-08-12 PROCEDURE — 99214 OFFICE O/P EST MOD 30 MIN: CPT

## 2019-08-12 PROCEDURE — 83036 HEMOGLOBIN GLYCOSYLATED A1C: CPT | Mod: QW

## 2019-08-12 NOTE — COUNSELING
[Potential consequences of obesity discussed] : Potential consequences of obesity discussed [Benefits of weight loss discussed] : Benefits of weight loss discussed [Needs reinforcement, provided] : Patient needs reinforcement on understanding of disease, goals and obesity follow-up plan; reinforcement was provided

## 2019-08-14 NOTE — REVIEW OF SYSTEMS
[Chest Pain] : no chest pain [Paroysmal Nocturnal Dyspnea] : no paroysmal nocturnal dyspnea [FreeTextEntry5] : normally sleeps on 3-4 pillows.

## 2019-08-14 NOTE — PHYSICAL EXAM
[No Acute Distress] : no acute distress [Well Nourished] : well nourished [Well Developed] : well developed [No JVD] : no jugular venous distention [Well-Appearing] : well-appearing [No Respiratory Distress] : no respiratory distress  [Clear to Auscultation] : lungs were clear to auscultation bilaterally [Normal Rate] : normal rate  [Regular Rhythm] : with a regular rhythm [Normal S1, S2] : normal S1 and S2 [de-identified] : sitting in wheelchair [de-identified] : dependent cyanosis - has small vesssel disease on VINOD last year, edema without change

## 2019-08-14 NOTE — HISTORY OF PRESENT ILLNESS
[de-identified] : Yanet is a 65 yo female with a h/o DM, HTN, hyperlipidemia, supramorbid obesity, left hemiparesis, chronic pain here with c/o LEGER over the past few weeks.  Outside uses an electric wheelchair but in the house ambulates with a walker.  She has found that just walking the few steps to her bathroom with her walker induces severe SOB such that she has to sit down and rest for awhile.  She spoke with a colleague of mine while I was out on vacation who advised her to go to the ED but she declined.  C/O wheezing so she has been using albuterol nebs with some improvement\par \par She also had a fall several weeks ago with some bruising but no severe injury.  Went to get up and her left leg and knee were completely numb and the leg just gave out under her.  Her son helped her get up and back into bed.  It took some time to get the feeling back.  \par \par No reports of hypoglycemia at this time.  Right foot pain but no lesions.

## 2019-08-14 NOTE — ASSESSMENT
[FreeTextEntry1] : 1.  LEGER - Pt with multiple CAD risk factors and I am concerned about an anginal equivalent.  She has declined ED evaluation.  Referred to cardiology to assess which stress test would be most beneficial given her size.\par 2.  DM - well controlled.  Continue current management.  \par 3.  Supra morbid obesity - lengthy d/w patient regarding need for weight loss, britany in light of recent leg giving way and her fall.  Referred to bariatric surgery for consideration of gastric sleeve surgery.\par 4.  Cervical radiculopathy s/p cervical vertebral fusion and chronic LBP  with recent fall secondary to "dead leg".  Would benefit from weight loss so that she could move around more.\par 5.  Chronic pain on long term narcotics.  No aberrent behavior noted. \par 6.   Severely restricted mobility with left hemiparesis.  She sleeps on 3-4 pillows and has trouble getting out of her bed.  She requires elevation of the head of the bed to be more than 30 degrees to help improve the mechanics of ventilation and prevent aspiration and she requires frequent changes in body position because of her chronic pain.  She also has lower extremity edema that requires leg elevation. Yanet requires positioning of the body to alleviate pain and pressure in ways not feasible in an ordinary bed.  She needs a semi-electric bed for positioning and to aid with getting in and out of bed.  \par 7.  RTO for a CPE

## 2019-08-25 ENCOUNTER — LABORATORY RESULT (OUTPATIENT)
Age: 64
End: 2019-08-25

## 2019-08-26 ENCOUNTER — NON-APPOINTMENT (OUTPATIENT)
Age: 64
End: 2019-08-26

## 2019-08-26 ENCOUNTER — APPOINTMENT (OUTPATIENT)
Dept: CARDIOLOGY | Facility: CLINIC | Age: 64
End: 2019-08-26
Payer: MEDICARE

## 2019-08-26 VITALS
RESPIRATION RATE: 16 BRPM | SYSTOLIC BLOOD PRESSURE: 145 MMHG | DIASTOLIC BLOOD PRESSURE: 80 MMHG | HEIGHT: 63 IN | HEART RATE: 71 BPM

## 2019-08-26 PROCEDURE — 93000 ELECTROCARDIOGRAM COMPLETE: CPT

## 2019-08-26 PROCEDURE — 99204 OFFICE O/P NEW MOD 45 MIN: CPT

## 2019-08-26 NOTE — H&P ADULT - PROBLEM SELECTOR PROBLEM 9
Nephrology Note: Nursing Outreach Encounter    REASON FOR CALL:                                                      REASON FOR CALL: Blood Pressure Follow Up                                          SITUATION/BACKROUND:                                                    Patient is being treated for HTN.      ASSESSMENT:                                                      Spoke with Camacho. His BP cuff  last week and he hasn't been able to get a new one yet. He will do so today or tomorrow, then follow up after he has a few BP readings. Denied symptoms or concerns today.    Uremic Symptoms: No       PLAN:                                                      Follow Up:   Follow up call in 1-2 weeks     Patient verbalized understanding and will contact the clinic with any further questions or concerns.     Bernda Falcon RN             Prophylactic measure

## 2019-08-29 ENCOUNTER — RX RENEWAL (OUTPATIENT)
Age: 64
End: 2019-08-29

## 2019-08-29 RX ORDER — POLYETHYLENE GLYCOL 3350, SODIUM SULFATE ANHYDROUS, SODIUM BICARBONATE, SODIUM CHLORIDE, POTASSIUM CHLORIDE 227.1; 21.5; 6.36; 5.53; .754 G/L; G/L; G/L; G/L; G/L
227.1 POWDER, FOR SOLUTION ORAL
Qty: 1 | Refills: 0 | Status: COMPLETED | COMMUNITY
Start: 2018-05-23 | End: 2019-08-29

## 2019-08-29 RX ORDER — BENZOCAINE .06; .7 ML/1; ML/1
6-70 SWAB TOPICAL
Qty: 5 | Refills: 3 | Status: COMPLETED | COMMUNITY
Start: 2018-08-09 | End: 2019-08-29

## 2019-09-10 ENCOUNTER — MEDICATION RENEWAL (OUTPATIENT)
Age: 64
End: 2019-09-10

## 2019-09-18 ENCOUNTER — RX RENEWAL (OUTPATIENT)
Age: 64
End: 2019-09-18

## 2019-09-23 ENCOUNTER — RX RENEWAL (OUTPATIENT)
Age: 64
End: 2019-09-23

## 2019-09-25 ENCOUNTER — RX RENEWAL (OUTPATIENT)
Age: 64
End: 2019-09-25

## 2019-09-27 ENCOUNTER — RX RENEWAL (OUTPATIENT)
Age: 64
End: 2019-09-27

## 2019-09-27 ENCOUNTER — APPOINTMENT (OUTPATIENT)
Dept: CARDIOLOGY | Facility: CLINIC | Age: 64
End: 2019-09-27

## 2019-09-27 ENCOUNTER — APPOINTMENT (OUTPATIENT)
Dept: CARDIOLOGY | Facility: CLINIC | Age: 64
End: 2019-09-27
Payer: MEDICARE

## 2019-09-27 VITALS
WEIGHT: 293 LBS | HEART RATE: 66 BPM | SYSTOLIC BLOOD PRESSURE: 134 MMHG | BODY MASS INDEX: 51.91 KG/M2 | HEIGHT: 63 IN | DIASTOLIC BLOOD PRESSURE: 72 MMHG | RESPIRATION RATE: 16 BRPM

## 2019-09-27 DIAGNOSIS — R06.81 APNEA, NOT ELSEWHERE CLASSIFIED: ICD-10-CM

## 2019-09-27 DIAGNOSIS — R01.1 CARDIAC MURMUR, UNSPECIFIED: ICD-10-CM

## 2019-09-27 DIAGNOSIS — I87.8 OTHER SPECIFIED DISORDERS OF VEINS: ICD-10-CM

## 2019-09-27 PROCEDURE — 99213 OFFICE O/P EST LOW 20 MIN: CPT

## 2019-09-27 PROCEDURE — 93306 TTE W/DOPPLER COMPLETE: CPT

## 2019-09-28 RX ORDER — LANCETS
EACH MISCELLANEOUS
Qty: 3 | Refills: 3 | Status: DISCONTINUED | COMMUNITY
Start: 2018-06-28 | End: 2019-09-28

## 2019-09-28 RX ORDER — BLOOD SUGAR DIAGNOSTIC
STRIP MISCELLANEOUS 4 TIMES DAILY
Qty: 120 | Refills: 3 | Status: DISCONTINUED | COMMUNITY
Start: 2017-06-23 | End: 2019-09-28

## 2019-09-28 RX ORDER — BLOOD-GLUCOSE METER
KIT MISCELLANEOUS
Qty: 1 | Refills: 0 | Status: DISCONTINUED | COMMUNITY
Start: 2017-06-23 | End: 2019-09-28

## 2019-09-28 RX ORDER — BLOOD-GLUCOSE METER
W/DEVICE EACH MISCELLANEOUS
Qty: 1 | Refills: 1 | Status: DISCONTINUED | COMMUNITY
Start: 2019-09-23 | End: 2019-09-28

## 2019-10-02 ENCOUNTER — TRANSCRIPTION ENCOUNTER (OUTPATIENT)
Age: 64
End: 2019-10-02

## 2019-10-04 ENCOUNTER — RX RENEWAL (OUTPATIENT)
Age: 64
End: 2019-10-04

## 2019-10-04 RX ORDER — BLOOD-GLUCOSE METER
W/DEVICE KIT MISCELLANEOUS
Qty: 1 | Refills: 0 | Status: ACTIVE | COMMUNITY
Start: 2019-09-25 | End: 1900-01-01

## 2019-10-07 ENCOUNTER — RX RENEWAL (OUTPATIENT)
Age: 64
End: 2019-10-07

## 2019-10-08 ENCOUNTER — RX RENEWAL (OUTPATIENT)
Age: 64
End: 2019-10-08

## 2019-10-17 PROBLEM — R01.1 HEART MURMUR: Status: ACTIVE | Noted: 2019-09-27

## 2019-10-18 ENCOUNTER — APPOINTMENT (OUTPATIENT)
Dept: PULMONOLOGY | Facility: CLINIC | Age: 64
End: 2019-10-18
Payer: MEDICARE

## 2019-10-18 VITALS
OXYGEN SATURATION: 90 % | DIASTOLIC BLOOD PRESSURE: 78 MMHG | WEIGHT: 293 LBS | RESPIRATION RATE: 15 BRPM | SYSTOLIC BLOOD PRESSURE: 153 MMHG | BODY MASS INDEX: 51.91 KG/M2 | HEIGHT: 63 IN | HEART RATE: 77 BPM

## 2019-10-18 DIAGNOSIS — R06.83 SNORING: ICD-10-CM

## 2019-10-18 DIAGNOSIS — Z87.898 PERSONAL HISTORY OF OTHER SPECIFIED CONDITIONS: ICD-10-CM

## 2019-10-18 PROCEDURE — 94060 EVALUATION OF WHEEZING: CPT

## 2019-10-18 PROCEDURE — 99204 OFFICE O/P NEW MOD 45 MIN: CPT | Mod: 25

## 2019-10-18 PROCEDURE — 94727 GAS DIL/WSHOT DETER LNG VOL: CPT

## 2019-10-18 PROCEDURE — ZZZZZ: CPT

## 2019-10-18 PROCEDURE — 94729 DIFFUSING CAPACITY: CPT

## 2019-10-18 NOTE — PROCEDURE
[FreeTextEntry1] : PFT 10/18/2019 personally reviewed moderate obstructive and restrictive ventilatory defect with moderate gas exchange abnormality and insignificant bronchodilator response

## 2019-10-18 NOTE — HISTORY OF PRESENT ILLNESS
[FreeTextEntry1] : Patient is a 63 year old female Hx multiple medical problems including morbid obesity, ovarian cancer, DM, Charcot foot, HTN, hyperlipidemia, presents to [facility] for evaluation shortness of breath.  Patient has been prescribed Advair in the past.  She uses this medication only intermittently.  She reports at times.  She reports she awakens during the night gasping for air.  She reports she is "frightened."  She is here for these  symptoms.  Patietn denies cough, chest pain, or systemic complaints.

## 2019-10-18 NOTE — PHYSICAL EXAM
[Normal Conjunctiva] : the conjunctiva exhibited no abnormalities [Erythema] : erythema of the pharynx [] : the neck was supple [Jugular Venous Distention Increased] : there was no jugular-venous distention [Heart Sounds] : normal S1 and S2 [Auscultation Breath Sounds / Voice Sounds] : lungs were clear to auscultation bilaterally [Abdomen Soft] : soft [Nail Clubbing] : no clubbing of the fingernails [2+ Pitting] : 2+  pitting [Skin Color & Pigmentation] : normal skin color and pigmentation [Cranial Nerves] : cranial nerves 2-12 were intact [Oriented To Time, Place, And Person] : oriented to person, place, and time [FreeTextEntry1] : Wheelchair

## 2019-10-18 NOTE — ASSESSMENT
[FreeTextEntry1] : 64 year old female Hx multiple medical problems including morbid obesity, DM, HTN, PFT suggestive asthma, symptoms multifactorial suspect obesity hypoventilation syndrome\par \par Obtain Sleep Study\par Continue  Advair 250/50 1 puff twice daily\par Nebulizer machine ordered \par Duo Neb twice daily and every 6 hours if needed \par \par Follow up after Sleep Study

## 2019-10-18 NOTE — REVIEW OF SYSTEMS
[Recent Wt Gain (___ Lbs)] : recent [unfilled] ~Ulb weight gain [As Noted in HPI] : as noted in HPI [Dyspnea] : dyspnea [Hypertension] : ~T hypertension [Orthopnea] : orthopnea [Anxiety] : anxiety [Diabetes] : diabetes mellitus [Snoring] : snoring [Witnessed Apneas] : demonstrated ~M apnea [Nonrestorative Sleep] : nonrestorative sleep [Awakes With Headache] : awakes with a headache [Awakes With Dry Mouth] : awakes with dry mouth [Negative] : Pulmonary Hypertension

## 2019-10-22 ENCOUNTER — RX RENEWAL (OUTPATIENT)
Age: 64
End: 2019-10-22

## 2019-10-23 ENCOUNTER — MEDICATION RENEWAL (OUTPATIENT)
Age: 64
End: 2019-10-23

## 2019-10-28 ENCOUNTER — RX RENEWAL (OUTPATIENT)
Age: 64
End: 2019-10-28

## 2019-11-11 ENCOUNTER — LABORATORY RESULT (OUTPATIENT)
Age: 64
End: 2019-11-11

## 2019-11-22 ENCOUNTER — APPOINTMENT (OUTPATIENT)
Dept: INTERNAL MEDICINE | Facility: CLINIC | Age: 64
End: 2019-11-22
Payer: MEDICARE

## 2019-11-22 VITALS — HEART RATE: 87 BPM | SYSTOLIC BLOOD PRESSURE: 152 MMHG | OXYGEN SATURATION: 94 % | DIASTOLIC BLOOD PRESSURE: 70 MMHG

## 2019-11-22 DIAGNOSIS — Z87.898 PERSONAL HISTORY OF OTHER SPECIFIED CONDITIONS: ICD-10-CM

## 2019-11-22 DIAGNOSIS — L03.116 CELLULITIS OF LEFT LOWER LIMB: ICD-10-CM

## 2019-11-22 DIAGNOSIS — J45.40 MODERATE PERSISTENT ASTHMA, UNCOMPLICATED: ICD-10-CM

## 2019-11-22 DIAGNOSIS — Z00.00 ENCOUNTER FOR GENERAL ADULT MEDICAL EXAMINATION W/OUT ABNORMAL FINDINGS: ICD-10-CM

## 2019-11-22 DIAGNOSIS — M79.673 PAIN IN UNSPECIFIED FOOT: ICD-10-CM

## 2019-11-22 DIAGNOSIS — E11.65 TYPE 2 DIABETES MELLITUS WITH HYPERGLYCEMIA: ICD-10-CM

## 2019-11-22 DIAGNOSIS — M79.609 PAIN IN UNSPECIFIED LIMB: ICD-10-CM

## 2019-11-22 DIAGNOSIS — L02.419 CUTANEOUS ABSCESS OF LIMB, UNSPECIFIED: ICD-10-CM

## 2019-11-22 DIAGNOSIS — Z87.09 PERSONAL HISTORY OF OTHER DISEASES OF THE RESPIRATORY SYSTEM: ICD-10-CM

## 2019-11-22 DIAGNOSIS — Z01.419 ENCOUNTER FOR GYNECOLOGICAL EXAMINATION (GENERAL) (ROUTINE) W/OUT ABNORMAL FINDINGS: ICD-10-CM

## 2019-11-22 DIAGNOSIS — Z78.0 ASYMPTOMATIC MENOPAUSAL STATE: ICD-10-CM

## 2019-11-22 DIAGNOSIS — S43.421A SPRAIN OF RIGHT ROTATOR CUFF CAPSULE, INITIAL ENCOUNTER: ICD-10-CM

## 2019-11-22 DIAGNOSIS — Z87.81 PERSONAL HISTORY OF (HEALED) TRAUMATIC FRACTURE: ICD-10-CM

## 2019-11-22 DIAGNOSIS — Z23 ENCOUNTER FOR IMMUNIZATION: ICD-10-CM

## 2019-11-22 LAB
ANION GAP SERPL CALC-SCNC: 16 MMOL/L
BUN SERPL-MCNC: 33 MG/DL
CALCIUM SERPL-MCNC: 9.9 MG/DL
CHLORIDE SERPL-SCNC: 103 MMOL/L
CO2 SERPL-SCNC: 24 MMOL/L
CREAT SERPL-MCNC: 1.64 MG/DL
GLUCOSE BLDC GLUCOMTR-MCNC: 139
GLUCOSE SERPL-MCNC: 131 MG/DL
POTASSIUM SERPL-SCNC: 4.6 MMOL/L
SODIUM SERPL-SCNC: 143 MMOL/L

## 2019-11-22 PROCEDURE — 90670 PCV13 VACCINE IM: CPT

## 2019-11-22 PROCEDURE — 90472 IMMUNIZATION ADMIN EACH ADD: CPT

## 2019-11-22 PROCEDURE — 90662 IIV NO PRSV INCREASED AG IM: CPT

## 2019-11-22 PROCEDURE — G0439: CPT

## 2019-11-22 PROCEDURE — G0442 ANNUAL ALCOHOL SCREEN 15 MIN: CPT

## 2019-11-22 PROCEDURE — G0009: CPT

## 2019-11-22 PROCEDURE — 82962 GLUCOSE BLOOD TEST: CPT

## 2019-11-22 PROCEDURE — 99214 OFFICE O/P EST MOD 30 MIN: CPT | Mod: 25

## 2019-11-22 PROCEDURE — G0444 DEPRESSION SCREEN ANNUAL: CPT

## 2019-11-22 NOTE — HISTORY OF PRESENT ILLNESS
[Subsequent Annual Wellness Visit] : subsequent annual wellness visit [ECG (___ Results)] : ECG ~U([unfilled]) [] :  [None Indicated] : none indicated [Non-User] : non-user [Low Carb Diet] : low carbohydrate food choices [No Excercise] : The patient does not exercise [Mobility Impairment] : mobility impairment [Polypharmacy] : polypharmacy [Deconditioning] : deconditioning [Up&Go Test Unsteady/ >30sec] : up and go test was unsteady or greater than thirty seconds [Living Will] : living will [Over the Past 2 Weeks, Have You Felt Down, Depressed, or Hopeless?] : ~He/She~ denies feeling down, depressed, or hopeless over the past two weeks [Over the Past 2 Weeks, Have You Felt Little Interest or Pleasure Doing Things?] : ~He/She~ denies feeling little interest or pleasure in doing things over the past two weeks [Cognitive Impairment] : no cognitive impairment

## 2019-11-22 NOTE — PHYSICAL EXAM
[No Acute Distress] : no acute distress [Well Nourished] : well nourished [Well Developed] : well developed [Well-Appearing] : well-appearing [Normal Sclera/Conjunctiva] : normal sclera/conjunctiva [EOMI] : extraocular movements intact [No JVD] : no jugular venous distention [No Lymphadenopathy] : no lymphadenopathy [No Respiratory Distress] : no respiratory distress  [Clear to Auscultation] : lungs were clear to auscultation bilaterally [Normal Rate] : normal rate  [Regular Rhythm] : with a regular rhythm [Normal S1, S2] : normal S1 and S2 [No Carotid Bruits] : no carotid bruits [Soft] : abdomen soft [Normal Supraclavicular Nodes] : no supraclavicular lymphadenopathy [Normal Posterior Cervical Nodes] : no posterior cervical lymphadenopathy [Normal Anterior Cervical Nodes] : no anterior cervical lymphadenopathy [No Rash] : no rash [Normal Affect] : the affect was normal [Alert and Oriented x3] : oriented to person, place, and time [Normal Mood] : the mood was normal [Right Foot Was Examined] : Right foot ~C was examined [Left Foot Was Examined] : left foot ~C was examined [None] : no ulcers in either foot were found [] : normal [de-identified] : sitting in wheelchair [de-identified] : dependent cyanosis - has small vessel disease on VINOD in the past, edema without change [de-identified] : obesely distended [de-identified] : Left hemiparesis with tremor [TWNoteComboBox5] : +1 [TWNoteComboBox6] : +1

## 2019-11-22 NOTE — ASSESSMENT
[FreeTextEntry1] : 1.  Annual wellness visit as above.  ADLs are severely limited secondary to cervical radiculopathy status post surgery in the past.  Remains essentially wheelchair bound when she is outside of the home.  Is able to transfer but with difficulty as she is becoming more short of breath when she does so.\par 2.  Dyspnea on exertion, awakening gasping for breath.  Recent echo did not show much in the way of ischemia or valvular heart disease.  Suspect deconditioning is a large component.  Is felt to have asthma and is using albuterol nebs on a regular basis as per pulmonary.  It was suggested that she get a sleep study but has yet to schedule that.  She likely has obesity hypoventilation syndrome which is causing her to awaken gasping for breath.  To increase activity as tolerated but she is limited in what she can do.\par 3.  Chronic pain without any aberrant behavior noted.  She remains on both short and long-acting narcotics.  Uses clonazepam for muscle spasticity.  Patient gets her narcotics through [employer] and recently was brought to a hearing in front of the  because of her narcotic use.  There was an independent review of her medications without meeting her personally and the reviewer felt she should be tapered off of her meds.  I was incensed and highly opposed to this.  When the  heard the story he concluded that she should remain on her regimen as prescribed by me and that allows her to function and take care of her severely handicapped son.\par 4.  Diabetes -stable.  Reminded of the need for an eye exam.  Foot care reiterated.  Has a podiatrist come to her house every 2 months.\par 5.  Worsening renal function possibly related to doubling her dose of Lasix.  As her echo did not show any significant CHF and her BUN and creatinine are worsening we will cut back on the Lasix to 20 mg once a day.  In addition we will have her see nephrology for further evaluation.  She is currently off of her lisinopril.\par 6.  Labs as per plan.  Need to recheck her renal function today.\par 7.  History of endometrial cancer status post MEGAN/BSO.  Has been told by GYN that she no longer needs Pap smears/vaginal cuff smears.\par 8.  PAD - stable\par 9.  HTN - BP is somewhat elevated but she was somewhat anxious.  Continue current management for now pending nephrology evaluation..\par 10.  Hyperlipidemia with LDL above goal.  Recently switched to rosuvastatin by cardiology.  She is tolerating it without problem\par 11.  RTO 3 mos or prn.\par 8.  Super morbid obesity.  To continue to work on weight loss attempts.\par 9.  Return to office in 3 months or as needed

## 2019-11-22 NOTE — HEALTH RISK ASSESSMENT
[No falls in past year] : Patient reported no falls in the past year [0] : 2) Feeling down, depressed, or hopeless: Not at all (0) [Fair] : ~his/her~ current health as fair  [Good] : ~his/her~  mood as  good [No] : No [Access to Medications] : access to medications [Transportation] : transportation [Financial] : financial [With Family] : lives with family [On disability] : on disability [Single] : single [Independent] : managing finances [Some assistance needed] : preparing meals [Full assistance needed] : using transportation [Smoke Detector] : smoke detector [Carbon Monoxide Detector] : carbon monoxide detector [Seat Belt] :  uses seat belt [Sunscreen] : uses sunscreen [With Patient/Caregiver] : With Patient/Caregiver [FreeTextEntry1] : worsening kidney function since her medication was changed [] : No [[id number]] : 0 [Change in mental status noted] : No change in mental status noted [Language] : denies difficulty with language [Behavior] : denies difficulty with behavior [Learning/Retaining New Information] : denies difficulty learning/retaining new information [Handling Complex Tasks] : denies difficulty handling complex tasks [Reasoning] : denies difficulty with reasoning [Spatial Ability and Orientation] : denies difficulty with spatial ability and orientation [Reports changes in hearing] : Reports no changes in hearing [Reports changes in vision] : Reports no changes in vision [Guns at Home] : no guns at home [de-identified] : It is getting harder and harder for her to renew her narcotics [AdvancecareDate] : 11/19 [FreeTextEntry4] : Yanet is in the process of updating her will/living will.  I asked her to send us a copy of her HCP if she wishes to.

## 2019-11-22 NOTE — DISCUSSION/SUMMARY
[Subsequent Annual Wellness] : Subsequent Annual Wellness Visit [Preventive Exam & Counseling Completed] : with preventive exam as well as age and risk appropriate counseling completed [Healthy Weight] : counseling was given on maintaining a healthy weight [Improve Exercise Tolerance] : counseling was given on ways to improve exercise tolerance [Lipids Panel] : due for a lipid panel [Improve Physical Activity] : counseling was given on ways to improve physical activity [Screening Current] : screening is current [Risks and Benefits Discussed] : the risks and benefits of screening were discussed [Screening Mammogram] : due for a screening mammogram [Screening Not Indicated] : screening not indicated [Influenza Due Today] : influenza vaccine is due today [Risks & Benefits Discussed] : the risks and benefits of pneumococcal vaccination were discussed with the patient [Pneumococcal Due Today] : pneumococcal vaccine due today [Zostavax Vaccine UTD] : Zostavax vaccination up to date [Tdap Vaccine UTD] : Tdap vaccination up to date [Increase Physical Activity] : increasing physical activity [Nephrology] : nephrology [Podiatry] : podiatry [Follow-Up in ___] : follow-up visit needed in [unfilled] [de-identified] : Pt with h/o DM [de-identified] : Pt states GYN told her that she no longer needs pap smears since she is s/p a MEGAN/BSO for endometrial cancer [FreeTextEntry4] : Will discuss at a future visit

## 2019-11-22 NOTE — REVIEW OF SYSTEMS
[Lower Ext Edema] : lower extremity edema [Orthopnea] : orthopnea [Shortness Of Breath] : shortness of breath [Dyspnea on Exertion] : dyspnea on exertion [Negative] : Heme/Lymph

## 2019-11-25 ENCOUNTER — MEDICATION RENEWAL (OUTPATIENT)
Age: 64
End: 2019-11-25

## 2019-12-20 ENCOUNTER — MEDICATION RENEWAL (OUTPATIENT)
Age: 64
End: 2019-12-20

## 2020-01-10 ENCOUNTER — RX RENEWAL (OUTPATIENT)
Age: 65
End: 2020-01-10

## 2020-01-13 ENCOUNTER — APPOINTMENT (OUTPATIENT)
Dept: CARDIOLOGY | Facility: CLINIC | Age: 65
End: 2020-01-13

## 2020-02-15 ENCOUNTER — RX RENEWAL (OUTPATIENT)
Age: 65
End: 2020-02-15

## 2020-02-18 ENCOUNTER — APPOINTMENT (OUTPATIENT)
Dept: PULMONOLOGY | Facility: CLINIC | Age: 65
End: 2020-02-18

## 2020-03-08 ENCOUNTER — RX RENEWAL (OUTPATIENT)
Age: 65
End: 2020-03-08

## 2020-04-13 ENCOUNTER — APPOINTMENT (OUTPATIENT)
Dept: INTERNAL MEDICINE | Facility: CLINIC | Age: 65
End: 2020-04-13
Payer: MEDICARE

## 2020-04-13 PROCEDURE — 99441: CPT

## 2020-04-13 RX ORDER — CIPROFLOXACIN HYDROCHLORIDE 500 MG/1
500 TABLET, FILM COATED ORAL
Qty: 14 | Refills: 0 | Status: COMPLETED | COMMUNITY
Start: 2020-04-13 | End: 2020-04-20

## 2020-04-24 ENCOUNTER — APPOINTMENT (OUTPATIENT)
Dept: INTERNAL MEDICINE | Facility: CLINIC | Age: 65
End: 2020-04-24
Payer: MEDICARE

## 2020-04-24 PROCEDURE — 99442: CPT

## 2020-05-04 ENCOUNTER — RX RENEWAL (OUTPATIENT)
Age: 65
End: 2020-05-04

## 2020-05-21 NOTE — H&P PST ADULT - HEMATOLOGY/LYMPHATICS
[facility]  [address] [facility] [address] [address]    Call us at [phone] if you have any questions about your wounds, have redness or  swelling around your wound, have a fever of 101 or greater or if you have any other  problems or concerns. We answer the phone Monday through Friday 8 am to 4 pm,  please leave a message as we check the voicemail frequently throughout the day.    Marsha Mcneill 1977    Júnior Quintero fax [phone]    Medications/supplements to aid in healin. 1 packet of Amadeo Supplement into your favorite beverage TWICE a day  2. Vitamin D3 5,000 iu per day.    Wound Dressing Change:left ischial tuberosity  After cleansing with saline or wound cleanser, apply small amount of VASHE on gauze,  lay into wound bed, let sit for 10 minutes, remove gauze (do not rinse) then apply  dressing. Apply Silvadene cream into wound bed, fill dead space with 2x2 gauze if  needed and cover with foam adhesive or ABD with tape. Change dressing daily    Xavi Lua PA-C, May 21, 2020    [facility] [place] follow up with a telephone encounter on  at 2:00pm   negative

## 2020-07-16 ENCOUNTER — APPOINTMENT (OUTPATIENT)
Dept: INTERNAL MEDICINE | Facility: CLINIC | Age: 65
End: 2020-07-16
Payer: MEDICARE

## 2020-07-16 DIAGNOSIS — R50.9 FEVER, UNSPECIFIED: ICD-10-CM

## 2020-07-16 DIAGNOSIS — Z87.440 PERSONAL HISTORY OF URINARY (TRACT) INFECTIONS: ICD-10-CM

## 2020-07-16 PROCEDURE — 99442: CPT

## 2020-07-16 RX ORDER — LEVOFLOXACIN 500 MG/1
500 TABLET, FILM COATED ORAL DAILY
Qty: 7 | Refills: 0 | Status: COMPLETED | COMMUNITY
Start: 2020-07-16 | End: 2020-07-23

## 2020-07-17 LAB
APPEARANCE: CLEAR
BACTERIA: NEGATIVE
BILIRUBIN URINE: NEGATIVE
BLOOD URINE: NEGATIVE
COLOR: YELLOW
GLUCOSE QUALITATIVE U: NEGATIVE
HYALINE CASTS: 2 /LPF
KETONES URINE: NEGATIVE
LEUKOCYTE ESTERASE URINE: ABNORMAL
MICROSCOPIC-UA: NORMAL
NITRITE URINE: NEGATIVE
PH URINE: 6
PROTEIN URINE: NORMAL
RED BLOOD CELLS URINE: 3 /HPF
SPECIFIC GRAVITY URINE: 1.01
SQUAMOUS EPITHELIAL CELLS: 4 /HPF
UROBILINOGEN URINE: ABNORMAL
WHITE BLOOD CELLS URINE: 12 /HPF

## 2020-07-20 ENCOUNTER — INPATIENT (INPATIENT)
Facility: HOSPITAL | Age: 65
LOS: 3 days | Discharge: HOME HEALTH | DRG: 854 | End: 2020-07-24
Attending: HOSPITALIST | Admitting: STUDENT IN AN ORGANIZED HEALTH CARE EDUCATION/TRAINING PROGRAM
Payer: MEDICARE

## 2020-07-20 VITALS
OXYGEN SATURATION: 98 % | TEMPERATURE: 98 F | SYSTOLIC BLOOD PRESSURE: 150 MMHG | HEART RATE: 98 BPM | RESPIRATION RATE: 20 BRPM | HEIGHT: 63 IN | WEIGHT: 293 LBS | DIASTOLIC BLOOD PRESSURE: 61 MMHG

## 2020-07-20 DIAGNOSIS — Z90.49 ACQUIRED ABSENCE OF OTHER SPECIFIED PARTS OF DIGESTIVE TRACT: Chronic | ICD-10-CM

## 2020-07-20 DIAGNOSIS — Z90.710 ACQUIRED ABSENCE OF BOTH CERVIX AND UTERUS: Chronic | ICD-10-CM

## 2020-07-20 DIAGNOSIS — L08.9 LOCAL INFECTION OF THE SKIN AND SUBCUTANEOUS TISSUE, UNSPECIFIED: ICD-10-CM

## 2020-07-20 DIAGNOSIS — A41.9 SEPSIS, UNSPECIFIED ORGANISM: ICD-10-CM

## 2020-07-20 DIAGNOSIS — Z98.890 OTHER SPECIFIED POSTPROCEDURAL STATES: Chronic | ICD-10-CM

## 2020-07-20 DIAGNOSIS — G47.00 INSOMNIA, UNSPECIFIED: ICD-10-CM

## 2020-07-20 DIAGNOSIS — E11.40 TYPE 2 DIABETES MELLITUS WITH DIABETIC NEUROPATHY, UNSPECIFIED: ICD-10-CM

## 2020-07-20 DIAGNOSIS — Z02.9 ENCOUNTER FOR ADMINISTRATIVE EXAMINATIONS, UNSPECIFIED: ICD-10-CM

## 2020-07-20 DIAGNOSIS — N18.9 CHRONIC KIDNEY DISEASE, UNSPECIFIED: ICD-10-CM

## 2020-07-20 DIAGNOSIS — Z29.9 ENCOUNTER FOR PROPHYLACTIC MEASURES, UNSPECIFIED: ICD-10-CM

## 2020-07-20 DIAGNOSIS — G89.29 OTHER CHRONIC PAIN: ICD-10-CM

## 2020-07-20 DIAGNOSIS — I73.9 PERIPHERAL VASCULAR DISEASE, UNSPECIFIED: ICD-10-CM

## 2020-07-20 LAB
ALBUMIN SERPL ELPH-MCNC: 3.8 G/DL — SIGNIFICANT CHANGE UP (ref 3.3–5)
ALP SERPL-CCNC: 281 U/L — HIGH (ref 40–120)
ALT FLD-CCNC: 58 U/L — HIGH (ref 10–45)
ANION GAP SERPL CALC-SCNC: 17 MMOL/L — SIGNIFICANT CHANGE UP (ref 5–17)
APPEARANCE UR: CLEAR — SIGNIFICANT CHANGE UP
AST SERPL-CCNC: 26 U/L — SIGNIFICANT CHANGE UP (ref 10–40)
BASOPHILS # BLD AUTO: 0.38 K/UL — HIGH (ref 0–0.2)
BASOPHILS NFR BLD AUTO: 2.6 % — HIGH (ref 0–2)
BILIRUB SERPL-MCNC: 0.7 MG/DL — SIGNIFICANT CHANGE UP (ref 0.2–1.2)
BILIRUB UR-MCNC: NEGATIVE — SIGNIFICANT CHANGE UP
BUN SERPL-MCNC: 26 MG/DL — HIGH (ref 7–23)
CALCIUM SERPL-MCNC: 10.1 MG/DL — SIGNIFICANT CHANGE UP (ref 8.4–10.5)
CHLORIDE SERPL-SCNC: 100 MMOL/L — SIGNIFICANT CHANGE UP (ref 96–108)
CK MB BLD-MCNC: 2.5 % — SIGNIFICANT CHANGE UP (ref 0–3.5)
CK MB CFR SERPL CALC: 3.8 NG/ML — SIGNIFICANT CHANGE UP (ref 0–3.8)
CK SERPL-CCNC: 152 U/L — SIGNIFICANT CHANGE UP (ref 25–170)
CO2 SERPL-SCNC: 21 MMOL/L — LOW (ref 22–31)
COLOR SPEC: SIGNIFICANT CHANGE UP
CREAT SERPL-MCNC: 1.87 MG/DL — HIGH (ref 0.5–1.3)
DIFF PNL FLD: NEGATIVE — SIGNIFICANT CHANGE UP
EOSINOPHIL # BLD AUTO: 0 K/UL — SIGNIFICANT CHANGE UP (ref 0–0.5)
EOSINOPHIL NFR BLD AUTO: 0 % — SIGNIFICANT CHANGE UP (ref 0–6)
GAS PNL BLDV: SIGNIFICANT CHANGE UP
GIANT PLATELETS BLD QL SMEAR: PRESENT — SIGNIFICANT CHANGE UP
GLUCOSE BLDC GLUCOMTR-MCNC: 101 MG/DL — HIGH (ref 70–99)
GLUCOSE BLDC GLUCOMTR-MCNC: 118 MG/DL — HIGH (ref 70–99)
GLUCOSE SERPL-MCNC: 46 MG/DL — LOW (ref 70–99)
GLUCOSE UR QL: NEGATIVE — SIGNIFICANT CHANGE UP
HCT VFR BLD CALC: 45.4 % — HIGH (ref 34.5–45)
HGB BLD-MCNC: 13.8 G/DL — SIGNIFICANT CHANGE UP (ref 11.5–15.5)
KETONES UR-MCNC: NEGATIVE — SIGNIFICANT CHANGE UP
LEUKOCYTE ESTERASE UR-ACNC: NEGATIVE — SIGNIFICANT CHANGE UP
LYMPHOCYTES # BLD AUTO: 16.5 % — SIGNIFICANT CHANGE UP (ref 13–44)
LYMPHOCYTES # BLD AUTO: 2.42 K/UL — SIGNIFICANT CHANGE UP (ref 1–3.3)
MANUAL SMEAR VERIFICATION: SIGNIFICANT CHANGE UP
MCHC RBC-ENTMCNC: 28.5 PG — SIGNIFICANT CHANGE UP (ref 27–34)
MCHC RBC-ENTMCNC: 30.4 GM/DL — LOW (ref 32–36)
MCV RBC AUTO: 93.8 FL — SIGNIFICANT CHANGE UP (ref 80–100)
MONOCYTES # BLD AUTO: 1.14 K/UL — HIGH (ref 0–0.9)
MONOCYTES NFR BLD AUTO: 7.8 % — SIGNIFICANT CHANGE UP (ref 2–14)
NEUTROPHILS # BLD AUTO: 10.58 K/UL — HIGH (ref 1.8–7.4)
NEUTROPHILS NFR BLD AUTO: 71.3 % — SIGNIFICANT CHANGE UP (ref 43–77)
NEUTS BAND # BLD: 0.9 % — SIGNIFICANT CHANGE UP (ref 0–8)
NITRITE UR-MCNC: NEGATIVE — SIGNIFICANT CHANGE UP
NT-PROBNP SERPL-SCNC: 510 PG/ML — HIGH (ref 0–300)
PH UR: 6 — SIGNIFICANT CHANGE UP (ref 5–8)
PLAT MORPH BLD: NORMAL — SIGNIFICANT CHANGE UP
PLATELET # BLD AUTO: 236 K/UL — SIGNIFICANT CHANGE UP (ref 150–400)
POTASSIUM SERPL-MCNC: 4.6 MMOL/L — SIGNIFICANT CHANGE UP (ref 3.5–5.3)
POTASSIUM SERPL-SCNC: 4.6 MMOL/L — SIGNIFICANT CHANGE UP (ref 3.5–5.3)
PROMYELOCYTES # FLD: 0.9 % — HIGH (ref 0–0)
PROT SERPL-MCNC: 7.4 G/DL — SIGNIFICANT CHANGE UP (ref 6–8.3)
PROT UR-MCNC: NEGATIVE — SIGNIFICANT CHANGE UP
RBC # BLD: 4.84 M/UL — SIGNIFICANT CHANGE UP (ref 3.8–5.2)
RBC # FLD: 14.7 % — HIGH (ref 10.3–14.5)
RBC BLD AUTO: SIGNIFICANT CHANGE UP
SARS-COV-2 RNA SPEC QL NAA+PROBE: SIGNIFICANT CHANGE UP
SODIUM SERPL-SCNC: 138 MMOL/L — SIGNIFICANT CHANGE UP (ref 135–145)
SP GR SPEC: 1.01 — LOW (ref 1.01–1.02)
TROPONIN T, HIGH SENSITIVITY RESULT: 36 NG/L — SIGNIFICANT CHANGE UP (ref 0–51)
TROPONIN T, HIGH SENSITIVITY RESULT: 37 NG/L — SIGNIFICANT CHANGE UP (ref 0–51)
UROBILINOGEN FLD QL: NEGATIVE — SIGNIFICANT CHANGE UP
WBC # BLD: 14.65 K/UL — HIGH (ref 3.8–10.5)
WBC # FLD AUTO: 14.65 K/UL — HIGH (ref 3.8–10.5)

## 2020-07-20 PROCEDURE — 99223 1ST HOSP IP/OBS HIGH 75: CPT | Mod: GC

## 2020-07-20 PROCEDURE — 73560 X-RAY EXAM OF KNEE 1 OR 2: CPT | Mod: 26,LT

## 2020-07-20 PROCEDURE — 73620 X-RAY EXAM OF FOOT: CPT | Mod: 26,RT

## 2020-07-20 PROCEDURE — 76937 US GUIDE VASCULAR ACCESS: CPT | Mod: 26

## 2020-07-20 PROCEDURE — 36000 PLACE NEEDLE IN VEIN: CPT

## 2020-07-20 PROCEDURE — 99285 EMERGENCY DEPT VISIT HI MDM: CPT | Mod: 25

## 2020-07-20 PROCEDURE — 71045 X-RAY EXAM CHEST 1 VIEW: CPT | Mod: 26

## 2020-07-20 RX ORDER — SODIUM CHLORIDE 9 MG/ML
1000 INJECTION, SOLUTION INTRAVENOUS
Refills: 0 | Status: DISCONTINUED | OUTPATIENT
Start: 2020-07-20 | End: 2020-07-24

## 2020-07-20 RX ORDER — FUROSEMIDE 40 MG
1 TABLET ORAL
Qty: 0 | Refills: 0 | DISCHARGE

## 2020-07-20 RX ORDER — CLONAZEPAM 1 MG
0.5 TABLET ORAL DAILY
Refills: 0 | Status: DISCONTINUED | OUTPATIENT
Start: 2020-07-20 | End: 2020-07-24

## 2020-07-20 RX ORDER — DEXTROSE 50 % IN WATER 50 %
25 SYRINGE (ML) INTRAVENOUS ONCE
Refills: 0 | Status: DISCONTINUED | OUTPATIENT
Start: 2020-07-20 | End: 2020-07-24

## 2020-07-20 RX ORDER — INSULIN LISPRO 100/ML
5 VIAL (ML) SUBCUTANEOUS
Refills: 0 | Status: DISCONTINUED | OUTPATIENT
Start: 2020-07-20 | End: 2020-07-24

## 2020-07-20 RX ORDER — LISINOPRIL 2.5 MG/1
1 TABLET ORAL
Qty: 0 | Refills: 0 | DISCHARGE

## 2020-07-20 RX ORDER — OXYCODONE HYDROCHLORIDE 5 MG/1
80 TABLET ORAL ONCE
Refills: 0 | Status: DISCONTINUED | OUTPATIENT
Start: 2020-07-20 | End: 2020-07-20

## 2020-07-20 RX ORDER — INSULIN LISPRO 100/ML
VIAL (ML) SUBCUTANEOUS
Refills: 0 | Status: DISCONTINUED | OUTPATIENT
Start: 2020-07-20 | End: 2020-07-21

## 2020-07-20 RX ORDER — ACETAMINOPHEN 500 MG
650 TABLET ORAL ONCE
Refills: 0 | Status: COMPLETED | OUTPATIENT
Start: 2020-07-20 | End: 2020-07-20

## 2020-07-20 RX ORDER — DEXTROSE 50 % IN WATER 50 %
12.5 SYRINGE (ML) INTRAVENOUS ONCE
Refills: 0 | Status: DISCONTINUED | OUTPATIENT
Start: 2020-07-20 | End: 2020-07-24

## 2020-07-20 RX ORDER — HEPARIN SODIUM 5000 [USP'U]/ML
5000 INJECTION INTRAVENOUS; SUBCUTANEOUS EVERY 8 HOURS
Refills: 0 | Status: DISCONTINUED | OUTPATIENT
Start: 2020-07-20 | End: 2020-07-24

## 2020-07-20 RX ORDER — INSULIN GLARGINE 100 [IU]/ML
20 INJECTION, SOLUTION SUBCUTANEOUS AT BEDTIME
Refills: 0 | Status: DISCONTINUED | OUTPATIENT
Start: 2020-07-20 | End: 2020-07-24

## 2020-07-20 RX ORDER — GLUCAGON INJECTION, SOLUTION 0.5 MG/.1ML
1 INJECTION, SOLUTION SUBCUTANEOUS ONCE
Refills: 0 | Status: DISCONTINUED | OUTPATIENT
Start: 2020-07-20 | End: 2020-07-24

## 2020-07-20 RX ORDER — DEXTROSE 50 % IN WATER 50 %
15 SYRINGE (ML) INTRAVENOUS ONCE
Refills: 0 | Status: DISCONTINUED | OUTPATIENT
Start: 2020-07-20 | End: 2020-07-24

## 2020-07-20 RX ORDER — PIPERACILLIN AND TAZOBACTAM 4; .5 G/20ML; G/20ML
3.38 INJECTION, POWDER, LYOPHILIZED, FOR SOLUTION INTRAVENOUS ONCE
Refills: 0 | Status: COMPLETED | OUTPATIENT
Start: 2020-07-20 | End: 2020-07-20

## 2020-07-20 RX ORDER — INSULIN GLARGINE 100 [IU]/ML
10 INJECTION, SOLUTION SUBCUTANEOUS ONCE
Refills: 0 | Status: COMPLETED | OUTPATIENT
Start: 2020-07-20 | End: 2020-07-20

## 2020-07-20 RX ORDER — HUMAN INSULIN 100 [IU]/ML
70 INJECTION, SUSPENSION SUBCUTANEOUS AT BEDTIME
Refills: 0 | Status: DISCONTINUED | OUTPATIENT
Start: 2020-07-20 | End: 2020-07-20

## 2020-07-20 RX ORDER — VANCOMYCIN HCL 1 G
1000 VIAL (EA) INTRAVENOUS ONCE
Refills: 0 | Status: COMPLETED | OUTPATIENT
Start: 2020-07-20 | End: 2020-07-20

## 2020-07-20 RX ORDER — PIPERACILLIN AND TAZOBACTAM 4; .5 G/20ML; G/20ML
3.38 INJECTION, POWDER, LYOPHILIZED, FOR SOLUTION INTRAVENOUS EVERY 6 HOURS
Refills: 0 | Status: DISCONTINUED | OUTPATIENT
Start: 2020-07-20 | End: 2020-07-24

## 2020-07-20 RX ORDER — FUROSEMIDE 40 MG
40 TABLET ORAL ONCE
Refills: 0 | Status: COMPLETED | OUTPATIENT
Start: 2020-07-20 | End: 2020-07-20

## 2020-07-20 RX ORDER — OXYCODONE HYDROCHLORIDE 5 MG/1
10 TABLET ORAL ONCE
Refills: 0 | Status: DISCONTINUED | OUTPATIENT
Start: 2020-07-20 | End: 2020-07-20

## 2020-07-20 RX ORDER — HUMAN INSULIN 100 [IU]/ML
76 INJECTION, SUSPENSION SUBCUTANEOUS
Refills: 0 | Status: DISCONTINUED | OUTPATIENT
Start: 2020-07-20 | End: 2020-07-20

## 2020-07-20 RX ORDER — VANCOMYCIN HCL 1 G
1500 VIAL (EA) INTRAVENOUS EVERY 24 HOURS
Refills: 0 | Status: DISCONTINUED | OUTPATIENT
Start: 2020-07-20 | End: 2020-07-24

## 2020-07-20 RX ADMIN — OXYCODONE HYDROCHLORIDE 80 MILLIGRAM(S): 5 TABLET ORAL at 22:26

## 2020-07-20 RX ADMIN — INSULIN GLARGINE 10 UNIT(S): 100 INJECTION, SOLUTION SUBCUTANEOUS at 22:36

## 2020-07-20 RX ADMIN — Medication 250 MILLIGRAM(S): at 14:42

## 2020-07-20 RX ADMIN — OXYCODONE HYDROCHLORIDE 10 MILLIGRAM(S): 5 TABLET ORAL at 14:42

## 2020-07-20 RX ADMIN — PIPERACILLIN AND TAZOBACTAM 200 GRAM(S): 4; .5 INJECTION, POWDER, LYOPHILIZED, FOR SOLUTION INTRAVENOUS at 14:36

## 2020-07-20 RX ADMIN — Medication 40 MILLIGRAM(S): at 14:36

## 2020-07-20 RX ADMIN — PIPERACILLIN AND TAZOBACTAM 200 GRAM(S): 4; .5 INJECTION, POWDER, LYOPHILIZED, FOR SOLUTION INTRAVENOUS at 23:55

## 2020-07-20 RX ADMIN — OXYCODONE HYDROCHLORIDE 10 MILLIGRAM(S): 5 TABLET ORAL at 14:34

## 2020-07-20 RX ADMIN — Medication 0.5 MILLIGRAM(S): at 22:26

## 2020-07-20 NOTE — H&P ADULT - PROBLEM SELECTOR PLAN 6
-Pod rec vascular c/w for L foot  -VINOD? -VINOD/PVR  -Podiatry recommending vascular consult for L leg, but given exam with nonacute findings will hold off for now -VINOD/PVR  -Duplex US r/o DVT  -Podiatry recommending vascular consult for L leg, but given exam with nonacute findings will hold off for now

## 2020-07-20 NOTE — H&P ADULT - PROBLEM SELECTOR PLAN 8
-SW consult for complex home issues    Transitions of Care Status:  1.  Name of PCP: Paula Mccann  2.  PCP Contacted on Admission: [ ] Y    [x] N    3.  PCP contacted at Discharge: [ ] Y    [ ] N    [ ] N/A  4.  Post-Discharge Appointment Date and Location:  5.  Summary of Handoff given to PCP: -DVT: SQH q8h  -PT/OT

## 2020-07-20 NOTE — ED ADULT NURSE REASSESSMENT NOTE - NS ED NURSE REASSESS COMMENT FT1
Patient resting in stretcher aware she is admitted to the hospital and is pending a bed assignment.  Bed in lowest position.

## 2020-07-20 NOTE — H&P ADULT - NSHPLABSRESULTS_GEN_ALL_CORE
LABS  CBC 20 @ 12:46                        13.8   14.65 )-----------( 236                   45.4       Hgb trend: 13.8 <--   WBC trend: 14.65 <--         CMP 20 @ 12:46    138  |  100  |  26<H>  ----------------------------<  46<L>  4.6   |  21<L>  |  1.87<H>    Ca    10.1      20 @ 12:46    TPro  7.4  /  Alb  3.8  /  TBili  0.7  /  DBili  x   /  AST  26  /  ALT  58<H>  /  AlkPhos  281<H>        Serum Cr trend: 1.87 <--             Urinalysis Basic - ( 2020 17:38 )    Color: Light Yellow / Appearance: Clear / S.007 / pH: x  Gluc: x / Ketone: Negative  / Bili: Negative / Urobili: Negative   Blood: x / Protein: Negative / Nitrite: Negative   Leuk Esterase: Negative / RBC: x / WBC x   Sq Epi: x / Non Sq Epi: x / Bacteria: x        Cardiac Markers   20 @ 16:52: HS Trop 36    / CK x     / CKMB x      20 @ 12:46: HS Trop 37    /    / CKMB 3.8          MICRO      EKG      IMAGING  < from: Xray Foot AP + Lateral, Right (20 @ 13:45) >    1. Charcot changes are again seen centered at Chopart's joint.  2. No definite acute osseous destruction is appreciated.   3. MRI may be helpful for further evaluation as warranted.    < end of copied text >    < from: Xray Knee 1 or 2 Views, Left (20 @ 13:45) >    IMPRESSION:   1. No acute osseous abnormalities.     < end of copied text >    < from: Xray Chest 1 View- PORTABLE-Routine (20 @ 13:45) >      The mediastinal cardiac silhouette is unremarkable.    Limited by body habitus and patient position. Grossly clear lungs within the limitations of the exam.    No acute osseous finding.         < end of copied text > LABS  CBC 20 @ 12:46                        13.8   14.65 )-----------( 236                   45.4       Hgb trend: 13.8 <--   WBC trend: 14.65 <--         CMP 20 @ 12:46    138  |  100  |  26<H>  ----------------------------<  46<L>  4.6   |  21<L>  |  1.87<H>    Ca    10.1      20 @ 12:46    TPro  7.4  /  Alb  3.8  /  TBili  0.7  /  DBili  x   /  AST  26  /  ALT  58<H>  /  AlkPhos  281<H>        Serum Cr trend: 1.87 <--       CRP 7.13      Urinalysis Basic - ( 2020 17:38 )    Color: Light Yellow / Appearance: Clear / S.007 / pH: x  Gluc: x / Ketone: Negative  / Bili: Negative / Urobili: Negative   Blood: x / Protein: Negative / Nitrite: Negative   Leuk Esterase: Negative / RBC: x / WBC x   Sq Epi: x / Non Sq Epi: x / Bacteria: x        Cardiac Markers   20 @ 16:52: HS Trop 36    / CK x     / CKMB x      20 @ 12:46: HS Trop 37    /    / CKMB 3.8          MICRO      EKG      IMAGING  < from: Xray Foot AP + Lateral, Right (20 @ 13:45) >    1. Charcot changes are again seen centered at Chopart's joint.  2. No definite acute osseous destruction is appreciated.   3. MRI may be helpful for further evaluation as warranted.    < end of copied text >    < from: Xray Knee 1 or 2 Views, Left (20 @ 13:45) >    IMPRESSION:   1. No acute osseous abnormalities.     < end of copied text >    < from: Xray Chest 1 View- PORTABLE-Routine (20 @ 13:45) >      The mediastinal cardiac silhouette is unremarkable.    Limited by body habitus and patient position. Grossly clear lungs within the limitations of the exam.    No acute osseous finding.         < end of copied text > LABS and Imaging reviewed  EKG ordered     CBC 20 @ 12:46                        13.8   14.65 )-----------( 236                   45.4       Hgb trend: 13.8 <--   WBC trend: 14.65 <--         CMP 20 @ 12:46    138  |  100  |  26<H>  ----------------------------<  46<L>  4.6   |  21<L>  |  1.87<H>    Ca    10.1      20 @ 12:46    TPro  7.4  /  Alb  3.8  /  TBili  0.7  /  DBili  x   /  AST  26  /  ALT  58<H>  /  AlkPhos  281<H>        Serum Cr trend: 1.87 <--       CRP 7.13      Urinalysis Basic - ( 2020 17:38 )    Color: Light Yellow / Appearance: Clear / S.007 / pH: x  Gluc: x / Ketone: Negative  / Bili: Negative / Urobili: Negative   Blood: x / Protein: Negative / Nitrite: Negative   Leuk Esterase: Negative / RBC: x / WBC x   Sq Epi: x / Non Sq Epi: x / Bacteria: x        Cardiac Markers   20 @ 16:52: HS Trop 36    / CK x     / CKMB x      20 @ 12:46: HS Trop 37    /    / CKMB 3.8          MICRO      EKG      IMAGING  < from: Xray Foot AP + Lateral, Right (20 @ 13:45) >    1. Charcot changes are again seen centered at Chopart's joint.  2. No definite acute osseous destruction is appreciated.   3. MRI may be helpful for further evaluation as warranted.    < end of copied text >    < from: Xray Knee 1 or 2 Views, Left (20 @ 13:45) >    IMPRESSION:   1. No acute osseous abnormalities.     < end of copied text >    < from: Xray Chest 1 View- PORTABLE-Routine (20 @ 13:45) >      The mediastinal cardiac silhouette is unremarkable.    Limited by body habitus and patient position. Grossly clear lungs within the limitations of the exam.    No acute osseous finding.         < end of copied text >

## 2020-07-20 NOTE — H&P ADULT - NSHPSOCIALHISTORY_GEN_ALL_CORE
Never smoker, no alcohol, no illicits. Lives with her 2 sons, one of which has Down's syndrome. Due to her medical conditions and morbid obesity she is homebound, uses a walker at home but requires a wheelchair when out of home.

## 2020-07-20 NOTE — H&P ADULT - NSHPREVIEWOFSYSTEMS_GEN_ALL_CORE
REVIEW OF SYSTEMS:  [ ] Unable to assess ROS because ______  CONSTITUTIONAL: No fever, chills, night sweats, or fatigue  EYES: No eye pain, visual disturbances, or discharge  ENMT:  No difficulty hearing, tinnitus, vertigo; No sinus or throat pain  NECK: No pain or stiffness  BREASTS: No pain, masses, or nipple discharge  RESPIRATORY: No cough, wheezing, or hemoptysis, chronic SOB  CARDIOVASCULAR: +chest pain, resolved  GASTROINTESTINAL: No abdominal or epigastric pain. No nausea, vomiting, or hematemesis; No diarrhea or constipation. No melena or hematochezia.  GENITOURINARY: No dysuria, frequency, hematuria, or incontinence  NEUROLOGICAL: No headaches, memory loss, loss of strength, numbness, or tremors  SKIN: LE erythema, foot wounds  LYMPH NODES: No enlarged glands  ENDOCRINE: No heat or cold intolerance; No hair loss  MUSCULOSKELETAL: chronic LE edema  PSYCHIATRIC: No depression, anxiety, mood swings, or difficulty sleeping  HEME/LYMPH: No easy bruising, or bleeding gums  ALLERGY AND IMMUNOLOGIC: No hives or eczema

## 2020-07-20 NOTE — H&P ADULT - HISTORY OF PRESENT ILLNESS
65F PMH morbid obesity w/likely OHS (suspected by outpatient pulm, but pending sleep study), DM2 on insulin (A1c 11/2019 7.0) c/b neuropathy and charcot foot, CKD (Cr 1.64 Nov 2019), chronic venous stasis, spinal stenosis and cervical radiculopathy s/p ?partial repair 2002, homebound, endometrial CA 2006 s/p MEGAN/BSO, chronic back pain on opioids who presents with 5 days of fever and R foot pain. Patient's R foot symptoms began 2w ago when she noted a blister on the posterior aspect of her R foot. She called her podiatrist who evaluated her at home and she was advised to monitor. 5d ago, patient developed a fever to 101.3, associated with nausea improved with zofran, chest pain, and R leg swelling and redness worse than her usual. While she was showering that day, the R foot blister burst open and began to drain blood but no pus. Over the next several days, she had daily fevers and continued to have drainage from the foot, therefore she presented to ED.  Patient has chronic LE edema 2/2 venous stasis, which is recently worsened as above. Also has chronic SOB worse at night but no increase recently; has seen pulm as outpatient, and PFTs revealed obstructive pattern (possible asthma) and she was started on inhalers and referred for sleep study for likely obesity hypoventilation syndrome. Has been evaluated by Cardiology and has echo 2019 w/EF 66%, normal LVSF.   In ED, VS: 98.2, 98, 150/61, 20, 98% on 3L NC. She as given lasix 40mg IV x1, vanc/zosyn x1, and oxycodone 20mg. 65F PMH morbid obesity w/likely OHS (suspected by outpatient pulm, but pending sleep study), DM2 on insulin (A1c 11/2019 7.0) c/b neuropathy and charcot foot, CKD (Cr 1.64 Nov 2019), chronic venous stasis, spinal stenosis and cervical radiculopathy s/p ?partial repair 2002, homebound, endometrial CA 2006 s/p MEGAN/BSO, chronic back pain on opioids who presents with 5 days of fever and R foot pain. Patient's R foot symptoms began 2w ago when she noted a blister on the posterior aspect of her R foot. She called her podiatrist who evaluated her at home and she was advised to monitor. 5d ago, patient developed a fever to 101.3, associated with nausea improved with zofran, chest pain, and R leg swelling and redness worse than her usual. While she was showering that day, the R foot blister burst open and began to drain blood but no pus. Over the next several days, she had daily fevers and continued to have drainage from the foot, therefore she presented to ED.  Patient has chronic LE edema 2/2 venous stasis, which is recently worsened as above. Also has chronic SOB worse at night but no increase recently; has seen pulm as outpatient, and PFTs revealed obstructive pattern (possible asthma) and she was started on inhalers and referred for sleep study for likely obesity hypoventilation syndrome. Has been evaluated by Cardiology and has echo 2019 w/EF 66%, normal LVSF.   In ED, VS: 98.2, 98, 150/61, 20, 98% on 3L NC. She as given lasix 40mg IV x1, vanc/zosyn x1, and oxycodone 20mg. Podiatry consulted and performed debridement of R foot.

## 2020-07-20 NOTE — H&P ADULT - PROBLEM SELECTOR PLAN 3
-Home regimen w/NPH + premeal  -Diabetic diet Home regimen w/NPH 76u/70u AM/PM + 5u premeal  -Given hypoglycemic on admission, will lower basal insulin to lantus 20u tonight  -C/w 5u premeal, low SSI  -Place Endo consult in the AM  -Diabetic diet Home regimen w/NPH 76u/70u AM/PM + 5u premeal  -Given hypoglycemic on admission, will lower basal insulin to lantus 10u tonight  -C/w 5u premeal, low SSI  -Place Endo consult in the AM  -Diabetic diet

## 2020-07-20 NOTE — ED PROVIDER NOTE - ATTENDING CONTRIBUTION TO CARE
I saw and evaluated patient with ACP. I discussed H+P and MDM with ACP. I agree with the statements made by the ACP unless otherwise noted.    The care of this patient was in support of the [facility] countermeasures to Covid-19.

## 2020-07-20 NOTE — H&P ADULT - NSHPPHYSICALEXAM_GEN_ALL_CORE
T(F): 98.6 (07-20-20 @ 18:15), Max: 98.6 (07-20-20 @ 18:15)  HR: 93 (07-20-20 @ 18:15) (87 - 98)  BP: 136/64 (07-20-20 @ 18:15) (103/49 - 150/61)  BP(mean): --  ABP: --  ABP(mean): --  RR: 20 (07-20-20 @ 18:17) (18 - 20)  SpO2: 98% (07-20-20 @ 18:17) (88% - 98%)    GENERAL: NAD, lying in bed comfortably  HEAD:  Atraumatic, Normocephalic  EYES: EOMI, PERRLA, conjunctiva and sclera clear  ENT: Moist mucous membranes  NECK: Supple, No JVD  CHEST/LUNG: Clear to auscultation bilaterally; No rales, rhonchi, wheezing, or rubs. Unlabored respirations  HEART: Regular rate and rhythm; No murmurs, rubs, or gallops  ABDOMEN: Bowel sounds present; Soft, Nontender, Nondistended. No hepatomegaly  EXTREMITIES:  2+ Peripheral Pulses, brisk capillary refill. No clubbing, cyanosis, or edema  NERVOUS SYSTEM:  Alert & Oriented X3, speech clear. No deficits   MSK: FROM all 4 extremities, full and equal strength  SKIN: No rashes or lesions T(F): 98.6 (07-20-20 @ 18:15), Max: 98.6 (07-20-20 @ 18:15)  HR: 93 (07-20-20 @ 18:15) (87 - 98)  BP: 136/64 (07-20-20 @ 18:15) (103/49 - 150/61)  BP(mean): --  ABP: --  ABP(mean): --  RR: 20 (07-20-20 @ 18:17) (18 - 20)  SpO2: 98% (07-20-20 @ 18:17) (88% - 98%)    GENERAL: NAD, morbidly obese woman lying in bed in mild discomfort  HEAD:  Atraumatic, Normocephalic  EYES: EOMI, PERRLA, conjunctiva and sclera clear  ENT: Moist mucous membranes  NECK: Supple, unable to appreciate JVD  CHEST/LUNG: Clear to auscultation anteriorly, No rales, rhonchi, wheezing, or rubs. Respirations mildly labored  HEART: Regular rate and rhythm; No murmurs, rubs, or gallops  ABDOMEN: Bowel sounds present; Soft, Nontender, obese, No hepatomegaly, well healed scar on R side, chronic skin changes  EXTREMITIES:  BUE warm well perfused without skin changes or edema, BLE with equal nonpitting edema, erythema below mid-shin, with skin thickening and discoloration, R foot wrapped in clean, dry dressing, L foot with dry wound w/o drainage on 2nd toe  NERVOUS SYSTEM:  Alert & Oriented X3, speech clear, BLE neuropathy without sensation on ventral aspect of both feet, able to wiggle bilateral toes  MSK: Movement limited by morbid obesity   SKIN: As above T(F): 98.6 (07-20-20 @ 18:15), Max: 98.6 (07-20-20 @ 18:15)  HR: 93 (07-20-20 @ 18:15) (87 - 98)  BP: 136/64 (07-20-20 @ 18:15) (103/49 - 150/61)  BP(mean): --  ABP: --  ABP(mean): --  RR: 20 (07-20-20 @ 18:17) (18 - 20)  SpO2: 98% (07-20-20 @ 18:17) (88% - 98%)    GENERAL: NAD, morbidly obese woman lying in bed in mild discomfort  HEAD:  Atraumatic, Normocephalic  EYES: EOMI, PERRLA, conjunctiva and sclera clear  ENT: Moist mucous membranes  NECK: Supple, unable to appreciate JVD  CHEST/LUNG: Clear to auscultation anteriorly, No rales, rhonchi, wheezing, or rubs. Respirations mildly labored  HEART: Regular rate and rhythm; No murmurs, rubs, or gallops  ABDOMEN: Bowel sounds present; Soft, Nontender, obese, No hepatomegaly, well healed scar on R side, chronic skin changes  EXTREMITIES:  BUE warm well perfused without skin changes or edema, BLE with equal nonpitting edema, erythema below mid-shin, with skin thickening and discoloration, R foot wrapped in clean, dry dressing, L foot with dry wound w/o drainage on 2nd toe; pulses 1+ LLE  NERVOUS SYSTEM:  Alert & Oriented X3, speech clear, BLE neuropathy without sensation on ventral aspect of both feet, able to wiggle bilateral toes  MSK: Movement limited by morbid obesity   SKIN: As above

## 2020-07-20 NOTE — H&P ADULT - PROBLEM SELECTOR PLAN 9
-SW consult for complex home issues    Transitions of Care Status:  1.  Name of PCP: Paula Mccann  2.  PCP Contacted on Admission: [ ] Y    [x] N    3.  PCP contacted at Discharge: [ ] Y    [ ] N    [ ] N/A  4.  Post-Discharge Appointment Date and Location:  5.  Summary of Handoff given to PCP:

## 2020-07-20 NOTE — H&P ADULT - PROBLEM SELECTOR PLAN 4
-Will c/w home regimen  -Istop -Will c/w home regimen of oxycontin 80mg q12h  -[facility] #[id number] -Will c/w home regimen of oxycontin 80mg q12h  -[facility] #[id number]

## 2020-07-20 NOTE — CHART NOTE - NSCHARTNOTEFT_GEN_A_CORE
Patient Name: Yanet Rios Date: 1955  Address: [address] [place] [id number]: Female  Rx Written	Rx Dispensed	Drug	Quantity	Days Supply	Prescriber Name	Payment Method	Dispenser  07/09/2020	07/15/2020	oxycontin er 80 mg tablet	10	5	Paula Mccann MD	Insurance	[id number]  06/23/2020	06/25/2020	hydrocodone-acetaminophen 7.5-325 mg tablet	90	30	Tatum Matt)	Insurance	[id number]  06/23/2020	06/25/2020	clonazepam 0.5 mg tablet	30	30	Tatum Matt)	Insurance	[id number]  06/10/2020	06/16/2020	oxycontin er 80 mg tablet	60	30	Paula Mccann MD	Insurance	[id number]  05/27/2020	05/27/2020	clonazepam 0.5 mg tablet	30	30	Paula Mccann MD	Insurance	[id number]  05/27/2020	05/27/2020	hydrocodone-acetaminophen 7.5-325 mg tablet	90	30	Paula Mccann MD	Insurance	[id number]  05/12/2020	05/18/2020	oxycontin er 80 mg tablet	60	30	Paula Mccann MD	Insurance	[id number]  04/24/2020	05/02/2020	hydrocodone-acetaminophen 7.5-325 mg tablet	69	23	Paula Mccann MD	Medicare	[id number]  04/24/2020	04/24/2020	hydrocodone-acetaminophen 7.5-325 mg tablet	21	7	Paula Mccann MD	Medicare	[id number]  04/24/2020	04/24/2020	clonazepam 0.5 mg tablet	30	30	Paula Mccann MD	Medicare	[id number]  04/13/2020	04/20/2020	oxycontin er 80 mg tablet	50	30	Paula Mccann MD	Insurance	[id number]  04/13/2020	04/19/2020	oxycontin er 80 mg tablet	10	5	Paula Mccann MD	Insurance	[id number]  03/27/2020	04/02/2020	hydrocodone-acetaminophen 7.5-325 mg tablet	90	30	ZolliPaula MD	Insurance	[id number]  03/27/2020	04/02/2020	clonazepam 0.5 mg tablet	30	30	ZolliPaula MD	Insurance	[id number]  03/16/2020	03/21/2020	oxycontin er 80 mg tablet	60	30	ZolliPaula MD	Insurance	[id number]  02/28/2020	03/04/2020	hydrocodone-acetaminophen 7.5-325 mg tablet	90	30	ZolliPaula MD	Insurance	[id number]  02/28/2020	03/04/2020	clonazepam 0.5 mg tablet	30	30	ZolliPaula MD	Insurance	[id number]  02/20/2020	02/20/2020	oxycontin er 80 mg tablet	60	30	ZolliPaula MD	Insurance	[id number]  02/03/2020	02/04/2020	clonazepam 0.5 mg tablet	30	30	ZolliPaula MD	Insurance	[id number]  02/03/2020	02/04/2020	hydrocodone-acetaminophen 7.5-325 mg tablet	90	30	ZolliPaula MD	Insurance	[id number]  01/21/2020	01/22/2020	oxycontin er 80 mg tablet	60	30	ZolliPaula MD	Insurance	[id number]  01/03/2020	01/04/2020	hydrocodone-acetaminophen 7.5-325 mg tablet	90	30	ZolliPaula MD	Insurance	[id number]  01/03/2020	01/04/2020	clonazepam 0.5 mg tablet	30	30	ZolliPaula MD	Insurance	[id number]  12/24/2019	12/24/2019	oxycontin er 80 mg tablet	60	30	Kat Torres MD	Insurance	[id number]  11/22/2019	12/05/2019	hydrocodone-acetaminophen 7.5-325 mg tablet	90	30	ZolliPaula MD	Insurance	[id number]  11/22/2019	12/05/2019	clonazepam 0.5 mg tablet	30	30	ZolliPaula MD	Insurance	[id number]  11/22/2019	11/26/2019	oxycontin er 80 mg tablet	60	30	ZolliPaula MD	Insurance	[id number]  11/01/2019	11/07/2019	hydrocodone-acetaminophen 7.5-325 mg tablet	90	30	Nael Mendoza MD	Insurance	[id number]  11/07/2019	11/07/2019	clonazepam 0.5 mg tablet	30	30	N[facility] Mendoza MD	[id number]  10/23/2019	10/29/2019	oxycontin er 80 mg tablet	60	30	Z[facility]liPaula MD	[id number]  10/08/2019	10/09/2019	clonazepam 0.5 mg tablet	30	30	ZolliPaula MD	Insurance	[id number]  10/08/2019	10/09/2019	hydrocodone-acetaminophen 7.5-325 mg tablet	90	30	ZolliPaula MD	Insurance	[id number]  09/27/2019	09/30/2019	oxycontin er 80 mg tablet	60	30	ZolliPaula MD	Insurance	[id number]  08/29/2019	09/06/2019	hydrocodone-acetaminophen 7.5-325 mg tablet	90	30	ZolliPaula MD	Insurance	[id number]  08/29/2019	09/06/2019	clonazepam 0.5 mg tablet	30	30	ZolliPaula MD	Insurance	[id number]  08/29/2019	09/01/2019	oxycontin er 80 mg tablet	60	30	ZolliPaula MD	Insurance	[id number]  08/08/2019	08/09/2019	clonazepam 0.5 mg tablet	30	30	ZolliPaula MD	Insurance	[id number]  08/09/2019	08/09/2019	hydrocodone-acetaminophen 7.5-325 mg tablet	90	30	ZolliPaula MD	Insurance	[id number]  08/08/2019	08/08/2019	clonazepam 0.5 mg tablet	30	30	ZolliPaula MD	Insurance	[id number]  08/08/2019	08/08/2019	hydrocodone-acetaminophen 7.5-325 mg tablet	90	30	ZolliPaula MD	Insurance	[id number]  08/01/2019	08/03/2019	oxycontin er 80 mg tablet	60	30	Tatum Matt)	[id number] iSTOPP Reference Number: [id number]    Patient Name: Yanet Rios Date: 1955  Address: [address] [place], [place] [address]Sex: Female  Rx Written	Rx Dispensed	Drug	Quantity	Days Supply	Prescriber Name	Payment Method	Dispenser  07/09/2020	07/15/2020	oxycontin er 80 mg tablet	10	5	Paula Mccann MD	Insurance	[id number]  06/23/2020	06/25/2020	hydrocodone-acetaminophen 7.5-325 mg tablet	90	30	Tatum Matt)	Insurance	[id number]  06/23/2020	06/25/2020	clonazepam 0.5 mg tablet	30	30	Tatum Matt)	Insurance	[id number]  06/10/2020	06/16/2020	oxycontin er 80 mg tablet	60	30	ZolPaula elise MD	Insurance	[id number]  05/27/2020	05/27/2020	clonazepam 0.5 mg tablet	30	30	Paula Mccann MD	Insurance	[id number]  05/27/2020	05/27/2020	hydrocodone-acetaminophen 7.5-325 mg tablet	90	30	Paula Mccann MD	Insurance	[id number]  05/12/2020	05/18/2020	oxycontin er 80 mg tablet	60	30	Paula Mccann MD	Insurance	[id number]  04/24/2020	05/02/2020	hydrocodone-acetaminophen 7.5-325 mg tablet	69	23	Paula Mccann MD	Medicare	[id number]  04/24/2020	04/24/2020	hydrocodone-acetaminophen 7.5-325 mg tablet	21	7	Paula Mccann MD	Medicare	[id number]  04/24/2020	04/24/2020	clonazepam 0.5 mg tablet	30	30	Paula Mccann MD	Medicare	[id number]  04/13/2020	04/20/2020	oxycontin er 80 mg tablet	50	30	Paula Mccann MD	Insurance	[id number]  04/13/2020	04/19/2020	oxycontin er 80 mg tablet	10	5	Paula Mccann MD	Insurance	[id number]  03/27/2020	04/02/2020	hydrocodone-acetaminophen 7.5-325 mg tablet	90	30	ZolliPaula MD	Insurance	[id number]  03/27/2020	04/02/2020	clonazepam 0.5 mg tablet	30	30	ZolliPaula MD	Insurance	[id number]  03/16/2020	03/21/2020	oxycontin er 80 mg tablet	60	30	ZolliPaula MD	Insurance	[id number]  02/28/2020	03/04/2020	hydrocodone-acetaminophen 7.5-325 mg tablet	90	30	ZolliPaula MD	Insurance	[id number]  02/28/2020	03/04/2020	clonazepam 0.5 mg tablet	30	30	ZolliPaula MD	Insurance	[id number]  02/20/2020	02/20/2020	oxycontin er 80 mg tablet	60	30	ZolliPaula MD	Insurance	[id number]  02/03/2020	02/04/2020	clonazepam 0.5 mg tablet	30	30	ZolliPaula MD	Insurance	[id number]  02/03/2020	02/04/2020	hydrocodone-acetaminophen 7.5-325 mg tablet	90	30	ZolliPaula MD	Insurance	[id number]  01/21/2020	01/22/2020	oxycontin er 80 mg tablet	60	30	ZolliPaula MD	Insurance	[id number]  01/03/2020	01/04/2020	hydrocodone-acetaminophen 7.5-325 mg tablet	90	30	ZolliPaula MD	Insurance	[id number]  01/03/2020	01/04/2020	clonazepam 0.5 mg tablet	30	30	ZolliPaula MD	Insurance	[id number]  12/24/2019	12/24/2019	oxycontin er 80 mg tablet	60	30	Kat Torres MD	Insurance	[id number]  11/22/2019	12/05/2019	hydrocodone-acetaminophen 7.5-325 mg tablet	90	30	ZolliPaula MD	Insurance	[id number]  11/22/2019	12/05/2019	clonazepam 0.5 mg tablet	30	30	ZolliPaula MD	Insurance	[id number]  11/22/2019	11/26/2019	oxycontin er 80 mg tablet	60	30	ZolliPaula MD	Insurance	[id number]  11/01/2019	11/07/2019	hydrocodone-acetaminophen 7.5-325 mg tablet	90	30	Nael Mendoza MD	Insurance	[id number]  11/07/2019	11/07/2019	clonazepam 0.5 mg tablet	30	30	Nael Mendoza MD	Insurance	[id number]  10/23/2019	10/29/2019	oxycontin er 80 mg tablet	60	30	ZolliPaula MD	Insurance	[id number]  10/08/2019	10/09/2019	clonazepam 0.5 mg tablet	30	30	ZolliPaula MD	[id number]  10/08/2019	10/09/2019	hydrocodone-acetaminophen 7.5-325 mg tablet	90	30	ZolliPaula MD	Insurance	[id number]  09/27/2019	09/30/2019	oxycontin er 80 mg tablet	60	30	ZolliPaula MD	Insurance	[id number]  08/29/2019	09/06/2019	hydrocodone-acetaminophen 7.5-325 mg tablet	90	30	ZolliPaula MD	Insurance	[id number]  08/29/2019	09/06/2019	clonazepam 0.5 mg tablet	30	30	ZolliPaula MD	Insurance	[id number]  08/29/2019	09/01/2019	oxycontin er 80 mg tablet	60	30	ZolliPaula MD	Insurance	[id number]  08/08/2019	08/09/2019	clonazepam 0.5 mg tablet	30	30	ZolliPaula MD	Insurance	[id number]  08/09/2019	08/09/2019	hydrocodone-acetaminophen 7.5-325 mg tablet	90	30	ZolliPaula MD	Insurance	[id number]  08/08/2019	08/08/2019	clonazepam 0.5 mg tablet	30	30	ZolliPaula MD	Insurance	[id number]  08/08/2019	08/08/2019	hydrocodone-acetaminophen 7.5-325 mg tablet	90	30	ZolliPaula MD	Insurance	[id number]  08/01/2019	08/03/2019	oxycontin er 80 mg tablet	60	30	Tatum Matt)	Insurance	[id number]

## 2020-07-20 NOTE — H&P ADULT - ATTENDING COMMENTS
I was physically present for the key portions of the evaluation and management (E/M) service provided.  I agree with the above history, physical, and plan which I have reviewed and edited where appropriate.     Plan discussed with Patient, RN    65F PMH morbid obesity w/likely OHS (suspected by outpatient pulm, but pending sleep study), DM2 on insulin (A1c 11/2019 7.0) c/b neuropathy and charcot foot, CKD (Cr 1.64 Nov 2019), chronic venous stasis, spinal stenosis and cervical radiculopathy s/p ?partial repair 2002, homebound, chronic back pain on opioids who presents with sepsis 2/2 R foot wound and cellulitis, now on broad spectrum abx, with IV vanc/zosyn.  Trend ESR, f/u podiatry recs.  F/u blood cultures.  Check VINOD/PVR and venous duplex.  Will start patient on lantus 20u bedtime and humalog 5u TIDAC given hypoglycemic episodes.  Per chart review, patient had admission in 2018 for sepsis, also had hypoglycemic episodes.  At that time, patient was discharged on lantus 20u.  Will need to confirm home regimen in AM.  Endocrine consult in AM.

## 2020-07-20 NOTE — ED ADULT NURSE REASSESSMENT NOTE - NS ED NURSE REASSESS COMMENT FT1
Podiatrist consult done FS repeated 115 Changed to bariatric bed. Made comfortable Primafit on Voided 600cc urine

## 2020-07-20 NOTE — H&P ADULT - NSICDXPASTSURGICALHX_GEN_ALL_CORE_FT
PAST SURGICAL HISTORY:  C Section 1994     Cataract extracted with lens implant 1998 Right    Cervical Spinal Stenosis surgery x2 (01/2002, 7/2002)     Cholecystectomy/appendectomy @ age 25     D&C 2008 hysteroscopy, endometrial hyperplasia, 2009    D&C x2 1980's     Endometrial biopsy 12/02/09     H/O colonoscopy 1998    H/O laser iridotomy left eye, 2016    S/P appendectomy     S/P cholecystectomy     S/P total abdominal hysterectomy and bilateral salpingo-oophorectomy     Tonsillectomy as a child

## 2020-07-20 NOTE — CONSULT NOTE ADULT - SUBJECTIVE AND OBJECTIVE BOX
Podiatry pager #: [phone]/ [phone]    Patient is a 65y old  Female who presents with a chief complaint of     HPI:      PAST MEDICAL & SURGICAL HISTORY:  Other fecal abnormalities: +iFOBT  Narrow angle glaucoma of left eye  CKD (chronic kidney disease)  Insomnia  Edema of lower extremity: on lasix  Vitamin D deficiency  Morbid Obesity  Cataract  Dyslipidemia  Deep Vein Thrombosis (DVT): Left leg, 2004, treated and resolved  Spinal Stenosis, Lumbar  Cervical Stenosis of Spine  Endometrial Hyperplasia  HTN (Hypertension)  Diabetes Mellitus Type II  H/O colonoscopy: 1998  H/O laser iridotomy: left eye, 2016  Endometrial biopsy 12/02/09  Tonsillectomy as a child  Cervical Spinal Stenosis surgery x2 (01/2002, 7/2002)  D&C 2008: hysteroscopy, endometrial hyperplasia, 2009  D&C x2 1980's  Cholecystectomy/appendectomy @ age 25  C Section 1994  Cataract extracted with lens implant 1998: Right      MEDICATIONS  (STANDING):    MEDICATIONS  (PRN):      Allergies    adhesives (Rash)  latex (Rash)  No Known Drug Allergies  wool- rash, itch (Other)    Intolerances        VITALS:    Vital Signs Last 24 Hrs  T(C): 36.6 (20 Jul 2020 17:18), Max: 36.8 (20 Jul 2020 11:36)  T(F): 97.9 (20 Jul 2020 17:18), Max: 98.2 (20 Jul 2020 11:36)  HR: 87 (20 Jul 2020 17:18) (87 - 98)  BP: 103/49 (20 Jul 2020 17:18) (103/49 - 150/61)  BP(mean): --  RR: 18 (20 Jul 2020 17:18) (18 - 20)  SpO2: 97% (20 Jul 2020 17:18) (97% - 98%)    LABS:                          13.8   14.65 )-----------( 236      ( 20 Jul 2020 12:46 )             45.4       07-20    138  |  100  |  26<H>  ----------------------------<  46<L>  4.6   |  21<L>  |  1.87<H>    Ca    10.1      20 Jul 2020 12:46    TPro  7.4  /  Alb  3.8  /  TBili  0.7  /  DBili  x   /  AST  26  /  ALT  58<H>  /  AlkPhos  281<H>  07-20      CAPILLARY BLOOD GLUCOSE      POCT Blood Glucose.: 118 mg/dL (20 Jul 2020 17:26)  POCT Blood Glucose.: 115 mg/dL (20 Jul 2020 15:58)  POCT Blood Glucose.: 81 mg/dL (20 Jul 2020 14:40)  POCT Blood Glucose.: 48 mg/dL (20 Jul 2020 13:51)  POCT Blood Glucose.: 48 mg/dL (20 Jul 2020 13:49)          LOWER EXTREMITY PHYSICAL EXAM:    Vasular: Left foot DP/PT 2/4, right foot DP 2/4, PT NP. RLE erythema and significant edema. Left toes cool to touch.    Right foot previous charcot, w/ collapse of the medial arch of the foot.   Neuro: Epicritic sensation diminished to the level of digits B/L.    Right foot wound located at medial midfoot, w/ extension plantar-lateraly. medial wound is fibrogranular w/ surrounding skin sloughing. excisional debridement of skin sloughing performed to the level of subq, but not beyond the level of the Subq, revealing sero-sanginous discharge. The wound is erythematous, warm and malodorous.          RADIOLOGY & ADDITIONAL STUDIES:    < from: Xray Foot AP + Lateral, Right (07.20.20 @ 13:45) >  HISTORY:  Right foot pain. Charcot feet. Soft tissue wound.    VIEWS: 2  IMAGES: 2    COMPARISON: Bilateral foot x-rays dated November 7, 2018.    FINDINGS:     OSSEOUS STRUCTURES    Fractures:  There is chronic fragmentation and prominent productive changes of the midfoot Chopart's joint with displacement of the navicular consistent with Charcot change. The talus appears to articulate with the cuneiforms. No definite acute osseous destruction is appreciated.     JOINTS    Joint Space(s):  There appears to be increased now moderate narrowing of the tibiotalar joint space.     SOFT TISSUES:  Forefoot soft tissue swelling is noted. There is soft tissue induration at the level of the metatarsal bases. No gas is appreciated.    IMPRESSION:    1. Charcot changes are again seen centered at Chopart's joint.  2. No definite acute osseous destruction is appreciated.   3. MRI may be helpful for further evaluation as warranted.    < end of copied text >

## 2020-07-20 NOTE — ED ADULT NURSE REASSESSMENT NOTE - NS ED NURSE REASSESS COMMENT FT1
Pt c/o feeling "shakey." Took insulin this am without eating FS 48. Food and juice given IV being done by ultrasound team.

## 2020-07-20 NOTE — ED PROCEDURE NOTE - PROCEDURE ADDITIONAL DETAILS
Peripheral IV access in the Emergency Department obtained under dynamic ultrasound guidance with dark nonpulsatile blood return.  Catheter was flushed afterwards without any resistance or resulting extravasation.  IV catheter confirmed in compressible vein after insertion. Ultrasound-guided cannulation of a peripheral vein in the left upper extremity using a 20-g catheter.

## 2020-07-20 NOTE — H&P ADULT - ASSESSMENT
65F PMH morbid obesity w/likely OHS (suspected by outpatient pulm, but pending sleep study), DM2 on insulin (A1c 11/2019 7.0) c/b neuropathy and charcot foot, CKD (Cr 1.64 Nov 2019), chronic venous stasis, spinal stenosis and cervical radiculopathy s/p ?partial repair 2002, homebound, chronic back pain on opioids who presents with sepsis 2/2 R foot wound and cellulitis, now on broad spectrum abx, cannot r/o OM.     #Sepsis  -C/w broad spectrum  -F/u Bcx, Ucx, wound cx  -?MRI    #R foot wound  -Podiatry    #DM2  -Home regimen w/NPH + premeal  -Diabetic diet    #Chronic venous stasis  -Pod rec vascular c/w for L foot  -VINOD?    #Chronic pain  -Will c/w home regimen  -Istop  - 65F PMH morbid obesity w/likely OHS (suspected by outpatient pulm, but pending sleep study), DM2 on insulin (A1c 11/2019 7.0) c/b neuropathy and charcot foot, CKD (Cr 1.64 Nov 2019), chronic venous stasis, spinal stenosis and cervical radiculopathy s/p ?partial repair 2002, homebound, chronic back pain on opioids who presents with sepsis 2/2 R foot wound and cellulitis, now on broad spectrum abx, cannot r/o OM.

## 2020-07-20 NOTE — ED PROVIDER NOTE - CLINICAL SUMMARY MEDICAL DECISION MAKING FREE TEXT BOX
LOLIS Pittman MD: Pt is a 66 y/o Female with a PMH of IDDM, HTN, CKD, morbid obesity, L DVT (2006), HLD, cervical and lumbar stenosis who presents for c/o worsening R foot wound. Pt was seen by podiatrist last week for a wound/blister at the base of her R foot. Blister opened a few days ago and wound spread to the dorsum of her foot. States that she has been on levoquin outpt without improvement. She had fevers 4-5 days ago which has since resolved. C/o pain to R foot. Pt also c/o some SOB at rest, no CP. Pt c/o some recent pain to L knee, atraumatic, without swelling or redness to area. Pt denies: chest pain, cough, pleuritic chest pain, abdominal pain, n/v/d, back pain, neck pain, HA, neck stiffness, focal numbness or weakness, visual changes, dizziness, lightheadedness, leg swelling, recent travel, recent trauma, recent immobilization, dysuria, hematuria. Ddx includes, however, is not limited to: foot abscess, cellulitis, osteomyelitis, CHF, PNA, other. Plan: basic labs, bcx, vbg, CXR, proBNP, trop, 12-lead, XR R foot, XR L knee, pain control, empiric iv abx, podiatry eval, TBA

## 2020-07-20 NOTE — ED ADULT NURSE NOTE - NSIMPLEMENTINTERV_GEN_ALL_ED
Implemented All Fall with Harm Risk Interventions:  [place] to call system. Call bell, personal items and telephone within reach. Instruct patient to call for assistance. Room bathroom lighting operational. Non-slip footwear when patient is off stretcher. Physically safe environment: no spills, clutter or unnecessary equipment. Stretcher in lowest position, wheels locked, appropriate side rails in place. Provide visual cue, wrist band, yellow gown, etc. Monitor gait and stability. Monitor for mental status changes and reorient to person, place, and time. Review medications for side effects contributing to fall risk. Reinforce activity limits and safety measures with patient and family. Provide visual clues: red socks.

## 2020-07-20 NOTE — ED PROVIDER NOTE - OBJECTIVE STATEMENT
66 y/o  Female with a PMH of IDDM, HTN, CKD, morbid obesity, L DVT (2006), HLD, cervical and lumbar stenosis presents to the ed after being advised by foot doctor to come get evaluated after having a blister on foot burst a few days ago with fever. patient states she was in the shower about a week ago and noticed that a blister on the medial aspect of right foot bursted with clear discharge. patient then stated she had fevers in the mornings for the last 2 days highest being 101.2 which she took motrin for. patient states she woke up this morning with no fever. patient currently denies any CP, dizziness, change in vision, numbness tingling to extremeties at baseline no change, patient denies edema, orthopnea. denies any recent falls. patient was seen by foot doctor and put on levaquin and levofoxin which she is currentrly on and had blister wrapped. patient admits to some shortness of breath, and increase of pain to left knee. 66 y/o Female with a PMH of IDDM, HTN, CKD, morbid obesity, L DVT (2006), HLD, cervical and lumbar stenosis presents to the ed after being advised by foot doctor to come get evaluated after having a blister on foot burst a few days ago with fever. patient states she was in the shower about a week ago and noticed that a blister on the medial aspect of right foot bursted with clear discharge. patient then stated she had fevers in the mornings for the last 2 days highest being 101.2 which she took Motrin for. patient states she woke up this morning with no fever. patient currently denies any CP, dizziness, change in vision, numbness tingling to extremities at baseline no change, patient denies edema, orthopnea. denies any recent falls. patient was seen by foot doctor and put on Levaquin and Levofloxacin which she is currently on and had blister wrapped. patient admits to some shortness of breath, and increase of pain to left knee.

## 2020-07-20 NOTE — CONSULT NOTE ADULT - ASSESSMENT
Pt is 66 yo presenting w/ infected right foot wound and cellulitis  - Seen and examined  - Afebrile, WBC 14, CRP 7.13, ESR pending  - Right foot wound located at medial midfoot, w/ extension plantar-lateraly. Wound is fibrogranular w/ surrounding skin sloughing. excisional debridement of skin sloughing performed not beyond to the level of SubQ, but not beyond Subq, revealing sero-sanginous discharge. The wound is erythematous, warm, malodorous  - Culture taken of right foot wound  - Rec IV vanc/ zosyn  - Rec vascular consult to evaluate left lower extremity  - discussed w/ attending Pt is 64 yo presenting w/ infected right foot wound and cellulitis  - Seen and examined  - Afebrile, WBC 14, CRP 7.13, ESR pending  - Right foot wound located at medial midfoot, w/ extension plantar-lateraly. Wound is fibrogranular w/ surrounding skin sloughing. excisional debridement of skin sloughing performed not beyond to the level of SubQ, but not beyond Subq, revealing sero-sanginous discharge. The wound is erythematous, warm, malodorous  - Culture taken of right foot wound  - Rec IV vanc/ zosyn  - Rec vascular consult to evaluate left lower extremity  - Pod plan local wound care for now  - discussed w/ attending

## 2020-07-20 NOTE — ED ADULT NURSE NOTE - OBJECTIVE STATEMENT
64 y/o  female to ED via EMS with h/o  IDDM, HTN, CKD, morbid obesity, LLE DVT (2006), HLD, cervical and lumbar stenosis . Pt c/o L  foot  blister that burst a few days ago with fever. Patient states she was in the shower approx x 1 week ago when blister burst. Clear drainage. Highest temp 101.2. Pt has taken motrin and foot doctor put her on Levaquin has been taking for 5 days. recommended by foot doctor to come to ED. which she took Motrin for. Pt afebrile at this time.  Denies  CP, dizziness, change in vision, numbness.  Tingling to extremities at baseline .Denies edema . C/o worsening SOB on exertion Bilat extremities edematous with pain at L knee.

## 2020-07-20 NOTE — H&P ADULT - PROBLEM SELECTOR PLAN 1
-C/w broad spectrum: Vanc/cefepime given CKD  -F/u Bcx, Ucx, wound cx  -F/u inflammatory markers  -?MRI -C/w broad spectrum: Vanc/zosyn  -Trend BMP given CKD w/possible JACQUES  -F/u Bcx, Ucx, wound cx  -F/u inflammatory markers  -Patient is high risk for OM and cannot r/o based on current imaging, but podiatry evaluation revealed wound to SubQ only. Antibiotics currently do cover OM. Will hold off MRI for now but consider if clinically worsening.

## 2020-07-20 NOTE — H&P ADULT - NSHPOUTPATIENTPROVIDERS_GEN_ALL_CORE
PCP: Paula Mccann ([facility])  Cards: Eduardo Harrison  Pulm: Vonnie Layton

## 2020-07-20 NOTE — H&P ADULT - NSICDXPASTMEDICALHX_GEN_ALL_CORE_FT
PAST MEDICAL HISTORY:  Cataract     Cervical Stenosis of Spine     CKD (chronic kidney disease)     Deep Vein Thrombosis (DVT) Left leg, 2004, treated and resolved    Diabetes Mellitus Type II     Dyslipidemia     Edema of lower extremity on lasix    Endometrial Hyperplasia     HTN (Hypertension)     Insomnia     Morbid Obesity     Spinal Stenosis, Lumbar     Vitamin D deficiency

## 2020-07-20 NOTE — H&P ADULT - NSICDXFAMILYHX_GEN_ALL_CORE_FT
FAMILY HISTORY:  Family history of bladder cancer  Family history of pancreatic cancer    Grandparent  Still living? Unknown  Family history of lung cancer, Age at diagnosis: Age Unknown

## 2020-07-20 NOTE — ED PROVIDER NOTE - PROGRESS NOTE DETAILS
consulted Podiatry asking for ESR/ CRP to come assess patient Rogerio Owens PA-C multiple IV attempts unsuccessful, US team notified for IV access. Rogerio Owens PA-C episode of hypoglycemia BGL 48. patient given PO juice and lunch. will reasses. Rogerio Owens PA-C patient reassessed  Rogerio Owens PA-C ED Sign Out, eval for foot wound, complicated by recent hypoglycemia (not eating), pending admission to hospitalist -- Hitesh Espinal MD

## 2020-07-21 DIAGNOSIS — J45.20 MILD INTERMITTENT ASTHMA, UNCOMPLICATED: ICD-10-CM

## 2020-07-21 DIAGNOSIS — E11.628 TYPE 2 DIABETES MELLITUS WITH OTHER SKIN COMPLICATIONS: ICD-10-CM

## 2020-07-21 LAB
A1C WITH ESTIMATED AVERAGE GLUCOSE RESULT: 7.7 % — HIGH (ref 4–5.6)
ALBUMIN SERPL ELPH-MCNC: 3.4 G/DL — SIGNIFICANT CHANGE UP (ref 3.3–5)
ALP SERPL-CCNC: 209 U/L — HIGH (ref 40–120)
ALT FLD-CCNC: 39 U/L — SIGNIFICANT CHANGE UP (ref 10–45)
ANION GAP SERPL CALC-SCNC: 13 MMOL/L — SIGNIFICANT CHANGE UP (ref 5–17)
AST SERPL-CCNC: 17 U/L — SIGNIFICANT CHANGE UP (ref 10–40)
BASOPHILS # BLD AUTO: 0.09 K/UL — SIGNIFICANT CHANGE UP (ref 0–0.2)
BASOPHILS NFR BLD AUTO: 0.8 % — SIGNIFICANT CHANGE UP (ref 0–2)
BILIRUB SERPL-MCNC: 0.7 MG/DL — SIGNIFICANT CHANGE UP (ref 0.2–1.2)
BUN SERPL-MCNC: 27 MG/DL — HIGH (ref 7–23)
CALCIUM SERPL-MCNC: 9.5 MG/DL — SIGNIFICANT CHANGE UP (ref 8.4–10.5)
CHLORIDE SERPL-SCNC: 100 MMOL/L — SIGNIFICANT CHANGE UP (ref 96–108)
CO2 SERPL-SCNC: 29 MMOL/L — SIGNIFICANT CHANGE UP (ref 22–31)
CREAT SERPL-MCNC: 2.05 MG/DL — HIGH (ref 0.5–1.3)
CRP SERPL-MCNC: 4.74 MG/DL — HIGH (ref 0–0.4)
CULTURE RESULTS: SIGNIFICANT CHANGE UP
EOSINOPHIL # BLD AUTO: 0.3 K/UL — SIGNIFICANT CHANGE UP (ref 0–0.5)
EOSINOPHIL NFR BLD AUTO: 2.8 % — SIGNIFICANT CHANGE UP (ref 0–6)
ERYTHROCYTE [SEDIMENTATION RATE] IN BLOOD: 120 MM/HR — HIGH (ref 0–20)
ERYTHROCYTE [SEDIMENTATION RATE] IN BLOOD: 120 MM/HR — HIGH (ref 0–20)
ESTIMATED AVERAGE GLUCOSE: 174 MG/DL — HIGH (ref 68–114)
GLUCOSE BLDC GLUCOMTR-MCNC: 123 MG/DL — HIGH (ref 70–99)
GLUCOSE BLDC GLUCOMTR-MCNC: 128 MG/DL — HIGH (ref 70–99)
GLUCOSE BLDC GLUCOMTR-MCNC: 165 MG/DL — HIGH (ref 70–99)
GLUCOSE BLDC GLUCOMTR-MCNC: 242 MG/DL — HIGH (ref 70–99)
GLUCOSE BLDC GLUCOMTR-MCNC: 287 MG/DL — HIGH (ref 70–99)
GLUCOSE BLDC GLUCOMTR-MCNC: 67 MG/DL — LOW (ref 70–99)
GLUCOSE BLDC GLUCOMTR-MCNC: 85 MG/DL — SIGNIFICANT CHANGE UP (ref 70–99)
GLUCOSE SERPL-MCNC: 52 MG/DL — LOW (ref 70–99)
HCT VFR BLD CALC: 39.1 % — SIGNIFICANT CHANGE UP (ref 34.5–45)
HCV AB S/CO SERPL IA: 0.12 S/CO — SIGNIFICANT CHANGE UP (ref 0–0.99)
HCV AB SERPL-IMP: SIGNIFICANT CHANGE UP
HGB BLD-MCNC: 12 G/DL — SIGNIFICANT CHANGE UP (ref 11.5–15.5)
IMM GRANULOCYTES NFR BLD AUTO: 1.4 % — SIGNIFICANT CHANGE UP (ref 0–1.5)
LYMPHOCYTES # BLD AUTO: 2.52 K/UL — SIGNIFICANT CHANGE UP (ref 1–3.3)
LYMPHOCYTES # BLD AUTO: 23.2 % — SIGNIFICANT CHANGE UP (ref 13–44)
MAGNESIUM SERPL-MCNC: 2.2 MG/DL — SIGNIFICANT CHANGE UP (ref 1.6–2.6)
MCHC RBC-ENTMCNC: 28.8 PG — SIGNIFICANT CHANGE UP (ref 27–34)
MCHC RBC-ENTMCNC: 30.7 GM/DL — LOW (ref 32–36)
MCV RBC AUTO: 93.8 FL — SIGNIFICANT CHANGE UP (ref 80–100)
MONOCYTES # BLD AUTO: 0.88 K/UL — SIGNIFICANT CHANGE UP (ref 0–0.9)
MONOCYTES NFR BLD AUTO: 8.1 % — SIGNIFICANT CHANGE UP (ref 2–14)
NEUTROPHILS # BLD AUTO: 6.94 K/UL — SIGNIFICANT CHANGE UP (ref 1.8–7.4)
NEUTROPHILS NFR BLD AUTO: 63.7 % — SIGNIFICANT CHANGE UP (ref 43–77)
NRBC # BLD: 0 /100 WBCS — SIGNIFICANT CHANGE UP (ref 0–0)
PHOSPHATE SERPL-MCNC: 4.1 MG/DL — SIGNIFICANT CHANGE UP (ref 2.5–4.5)
PLATELET # BLD AUTO: 242 K/UL — SIGNIFICANT CHANGE UP (ref 150–400)
POTASSIUM SERPL-MCNC: 4.3 MMOL/L — SIGNIFICANT CHANGE UP (ref 3.5–5.3)
POTASSIUM SERPL-SCNC: 4.3 MMOL/L — SIGNIFICANT CHANGE UP (ref 3.5–5.3)
PROT SERPL-MCNC: 6.4 G/DL — SIGNIFICANT CHANGE UP (ref 6–8.3)
RBC # BLD: 4.17 M/UL — SIGNIFICANT CHANGE UP (ref 3.8–5.2)
RBC # FLD: 15 % — HIGH (ref 10.3–14.5)
SARS-COV-2 IGG SERPL QL IA: NEGATIVE — SIGNIFICANT CHANGE UP
SARS-COV-2 IGM SERPL IA-ACNC: <0.1 INDEX — SIGNIFICANT CHANGE UP
SODIUM SERPL-SCNC: 142 MMOL/L — SIGNIFICANT CHANGE UP (ref 135–145)
SPECIMEN SOURCE: SIGNIFICANT CHANGE UP
WBC # BLD: 10.88 K/UL — HIGH (ref 3.8–10.5)
WBC # FLD AUTO: 10.88 K/UL — HIGH (ref 3.8–10.5)

## 2020-07-21 PROCEDURE — 93970 EXTREMITY STUDY: CPT | Mod: 26

## 2020-07-21 PROCEDURE — 93923 UPR/LXTR ART STDY 3+ LVLS: CPT | Mod: 26

## 2020-07-21 PROCEDURE — 99233 SBSQ HOSP IP/OBS HIGH 50: CPT

## 2020-07-21 RX ORDER — INSULIN LISPRO 100/ML
VIAL (ML) SUBCUTANEOUS AT BEDTIME
Refills: 0 | Status: DISCONTINUED | OUTPATIENT
Start: 2020-07-21 | End: 2020-07-24

## 2020-07-21 RX ORDER — OXYCODONE HYDROCHLORIDE 5 MG/1
80 TABLET ORAL ONCE
Refills: 0 | Status: DISCONTINUED | OUTPATIENT
Start: 2020-07-21 | End: 2020-07-21

## 2020-07-21 RX ORDER — ASPIRIN/CALCIUM CARB/MAGNESIUM 324 MG
81 TABLET ORAL DAILY
Refills: 0 | Status: DISCONTINUED | OUTPATIENT
Start: 2020-07-22 | End: 2020-07-24

## 2020-07-21 RX ORDER — HYDROMORPHONE HYDROCHLORIDE 2 MG/ML
1 INJECTION INTRAMUSCULAR; INTRAVENOUS; SUBCUTANEOUS ONCE
Refills: 0 | Status: DISCONTINUED | OUTPATIENT
Start: 2020-07-21 | End: 2020-07-21

## 2020-07-21 RX ORDER — OXYCODONE HYDROCHLORIDE 5 MG/1
80 TABLET ORAL EVERY 12 HOURS
Refills: 0 | Status: DISCONTINUED | OUTPATIENT
Start: 2020-07-21 | End: 2020-07-24

## 2020-07-21 RX ORDER — BUDESONIDE AND FORMOTEROL FUMARATE DIHYDRATE 160; 4.5 UG/1; UG/1
2 AEROSOL RESPIRATORY (INHALATION)
Refills: 0 | Status: DISCONTINUED | OUTPATIENT
Start: 2020-07-21 | End: 2020-07-24

## 2020-07-21 RX ORDER — POLYETHYLENE GLYCOL 3350 17 G/17G
17 POWDER, FOR SOLUTION ORAL DAILY
Refills: 0 | Status: DISCONTINUED | OUTPATIENT
Start: 2020-07-22 | End: 2020-07-24

## 2020-07-21 RX ORDER — POLYETHYLENE GLYCOL 3350 17 G/17G
17 POWDER, FOR SOLUTION ORAL ONCE
Refills: 0 | Status: COMPLETED | OUTPATIENT
Start: 2020-07-21 | End: 2020-07-21

## 2020-07-21 RX ORDER — INSULIN LISPRO 100/ML
VIAL (ML) SUBCUTANEOUS
Refills: 0 | Status: DISCONTINUED | OUTPATIENT
Start: 2020-07-21 | End: 2020-07-24

## 2020-07-21 RX ORDER — COLLAGENASE CLOSTRIDIUM HIST. 250 UNIT/G
1 OINTMENT (GRAM) TOPICAL DAILY
Refills: 0 | Status: DISCONTINUED | OUTPATIENT
Start: 2020-07-21 | End: 2020-07-24

## 2020-07-21 RX ORDER — ATORVASTATIN CALCIUM 80 MG/1
80 TABLET, FILM COATED ORAL AT BEDTIME
Refills: 0 | Status: DISCONTINUED | OUTPATIENT
Start: 2020-07-21 | End: 2020-07-24

## 2020-07-21 RX ORDER — CHOLECALCIFEROL (VITAMIN D3) 125 MCG
1000 CAPSULE ORAL DAILY
Refills: 0 | Status: DISCONTINUED | OUTPATIENT
Start: 2020-07-21 | End: 2020-07-24

## 2020-07-21 RX ORDER — ASPIRIN/CALCIUM CARB/MAGNESIUM 324 MG
81 TABLET ORAL ONCE
Refills: 0 | Status: COMPLETED | OUTPATIENT
Start: 2020-07-21 | End: 2020-07-21

## 2020-07-21 RX ADMIN — Medication 300 MILLIGRAM(S): at 17:49

## 2020-07-21 RX ADMIN — Medication 1: at 17:49

## 2020-07-21 RX ADMIN — OXYCODONE HYDROCHLORIDE 80 MILLIGRAM(S): 5 TABLET ORAL at 00:06

## 2020-07-21 RX ADMIN — HYDROMORPHONE HYDROCHLORIDE 1 MILLIGRAM(S): 2 INJECTION INTRAMUSCULAR; INTRAVENOUS; SUBCUTANEOUS at 16:50

## 2020-07-21 RX ADMIN — BUDESONIDE AND FORMOTEROL FUMARATE DIHYDRATE 2 PUFF(S): 160; 4.5 AEROSOL RESPIRATORY (INHALATION) at 18:14

## 2020-07-21 RX ADMIN — Medication 5 UNIT(S): at 13:34

## 2020-07-21 RX ADMIN — PIPERACILLIN AND TAZOBACTAM 25 GRAM(S): 4; .5 INJECTION, POWDER, LYOPHILIZED, FOR SOLUTION INTRAVENOUS at 13:29

## 2020-07-21 RX ADMIN — HYDROMORPHONE HYDROCHLORIDE 1 MILLIGRAM(S): 2 INJECTION INTRAMUSCULAR; INTRAVENOUS; SUBCUTANEOUS at 16:36

## 2020-07-21 RX ADMIN — OXYCODONE HYDROCHLORIDE 80 MILLIGRAM(S): 5 TABLET ORAL at 19:48

## 2020-07-21 RX ADMIN — PIPERACILLIN AND TAZOBACTAM 25 GRAM(S): 4; .5 INJECTION, POWDER, LYOPHILIZED, FOR SOLUTION INTRAVENOUS at 22:56

## 2020-07-21 RX ADMIN — OXYCODONE HYDROCHLORIDE 80 MILLIGRAM(S): 5 TABLET ORAL at 05:55

## 2020-07-21 RX ADMIN — INSULIN GLARGINE 20 UNIT(S): 100 INJECTION, SOLUTION SUBCUTANEOUS at 22:47

## 2020-07-21 RX ADMIN — OXYCODONE HYDROCHLORIDE 80 MILLIGRAM(S): 5 TABLET ORAL at 21:45

## 2020-07-21 RX ADMIN — ATORVASTATIN CALCIUM 80 MILLIGRAM(S): 80 TABLET, FILM COATED ORAL at 22:48

## 2020-07-21 RX ADMIN — OXYCODONE HYDROCHLORIDE 80 MILLIGRAM(S): 5 TABLET ORAL at 06:45

## 2020-07-21 RX ADMIN — Medication 81 MILLIGRAM(S): at 17:52

## 2020-07-21 RX ADMIN — HEPARIN SODIUM 5000 UNIT(S): 5000 INJECTION INTRAVENOUS; SUBCUTANEOUS at 05:55

## 2020-07-21 RX ADMIN — Medication 0.5 MILLIGRAM(S): at 22:48

## 2020-07-21 RX ADMIN — HEPARIN SODIUM 5000 UNIT(S): 5000 INJECTION INTRAVENOUS; SUBCUTANEOUS at 22:57

## 2020-07-21 RX ADMIN — Medication 2: at 22:47

## 2020-07-21 RX ADMIN — HEPARIN SODIUM 5000 UNIT(S): 5000 INJECTION INTRAVENOUS; SUBCUTANEOUS at 13:28

## 2020-07-21 RX ADMIN — Medication 5 UNIT(S): at 17:50

## 2020-07-21 RX ADMIN — PIPERACILLIN AND TAZOBACTAM 25 GRAM(S): 4; .5 INJECTION, POWDER, LYOPHILIZED, FOR SOLUTION INTRAVENOUS at 05:55

## 2020-07-21 RX ADMIN — POLYETHYLENE GLYCOL 3350 17 GRAM(S): 17 POWDER, FOR SOLUTION ORAL at 18:11

## 2020-07-21 NOTE — PROGRESS NOTE ADULT - SUBJECTIVE AND OBJECTIVE BOX
Patient is a 65y old  Female who presents with a chief complaint of Fever (2020 10:23)       INTERVAL HPI/OVERNIGHT EVENTS:  Patient seen and evaluated at bedside.  Pt is resting comfortable in NAD. Denies N/V/F/C.    Allergies    adhesives (Rash)  latex (Rash)  No Known Drug Allergies  wool- rash, itch (Other)    Intolerances        Vital Signs Last 24 Hrs  T(C): 37 (2020 14:20), Max: 37 (2020 18:15)  T(F): 98.6 (2020 14:20), Max: 98.6 (2020 18:15)  HR: 81 (2020 14:20) (71 - 93)  BP: 109/66 (2020 14:20) (100/56 - 136/64)  BP(mean): --  RR: 18 (2020 14:20) (18 - 20)  SpO2: 97% (2020 14:20) (88% - 98%)    LABS:                        12.0   10.88 )-----------( 242      ( 2020 07:13 )             39.1     07-21    142  |  100  |  27<H>  ----------------------------<  52<L>  4.3   |  29  |  2.05<H>    Ca    9.5      2020 07:13  Phos  4.1     07-21  Mg     2.2     07-21    TPro  6.4  /  Alb  3.4  /  TBili  0.7  /  DBili  x   /  AST  17  /  ALT  39  /  AlkPhos  209<H>  07-21      Urinalysis Basic - ( 2020 17:38 )    Color: Light Yellow / Appearance: Clear / S.007 / pH: x  Gluc: x / Ketone: Negative  / Bili: Negative / Urobili: Negative   Blood: x / Protein: Negative / Nitrite: Negative   Leuk Esterase: Negative / RBC: x / WBC x   Sq Epi: x / Non Sq Epi: x / Bacteria: x      CAPILLARY BLOOD GLUCOSE      POCT Blood Glucose.: 128 mg/dL (2020 13:20)  POCT Blood Glucose.: 123 mg/dL (2020 09:19)  POCT Blood Glucose.: 85 mg/dL (2020 08:47)  POCT Blood Glucose.: 67 mg/dL (2020 08:23)  POCT Blood Glucose.: 101 mg/dL (2020 21:44)  POCT Blood Glucose.: 118 mg/dL (2020 17:26)  POCT Blood Glucose.: 115 mg/dL (2020 15:58)      Lower Extremity Physical Exam:  Vascular: Left foot DP/PT 2/4, right foot DP 2/4, PT NP. RLE erythema and significant edema. Left toes cool to touch.    Right foot previous charcot, w/ collapse of the medial arch of the foot.   Neuro: Epicritic sensation diminished to the level of digits B/L.    Right foot wound located at medial midfoot, w/ extension plantar-lateraly. medial wound is fibrogranular w/ surrounding skin sloughing. excisional debridement of skin sloughing performed to the level of subq, but not beyond the level of the Subq, revealing sero-sanginous discharge. The wound is erythematous, warm and malodorous.      RADIOLOGY & ADDITIONAL TESTS:  < from: [facility] Physiol Extremity Lower 3+ Level, BI (20 @ 12:36) >  EXAM:  PHYSIOL EXTREM LOW 3+ LEV BI                            PROCEDURE DATE:  2020            INTERPRETATION:  History: Nonhealing ulcer right foot, diminished pedal pulses.    Risk factors: Diabetes, hypertension.    Segmental blood pressure measurements were not obtained due to limb girth.    The right ankle-brachial index equals 0.96; the left ankle-brachial index equals 0.95.    The blood pressure measurements at the right ankle are 165 mmHg in the posterior tibial artery and 156 mmHg in the dorsal pedis artery.    The blood pressure measurements at the left ankle are 164 mmHg in the posterior tibial artery and 158 mmHg in the dorsal pedis artery.    The amplitude of the pulse volume recordings are normal at the levels of the right and left calf to ankle.    The amplitude is slightly reduced at the level of the right metatarsals compared with the left and at the right toes compared with the left.    Impression: Significant right lower extremity arterial vascular disease is not identified.                    JEAN ADAMS M.D., ATTENDING RADIOLOGIST  This document has been electronically signed. 2020 12:56PM    < end of copied text >

## 2020-07-21 NOTE — PROGRESS NOTE ADULT - PROBLEM SELECTOR PLAN 1
-C/w broad spectrum: Vanc/zosyn  -Trend BMP given CKD w/possible JACQUES  -F/u Bcx, Ucx, wound cx  -ESR elevated  -Patient is high risk for OM but  podiatry evaluation revealed wound to Subcut only. - Antibiotics currently do cover OM. Will hold off MRI for now but consider if clinically worsening.

## 2020-07-21 NOTE — CHART NOTE - NSCHARTNOTEFT_GEN_A_CORE
Fellow paged with consult for patient with T2D with hypoglycemia about 6pm. We have finished rounds for the day so the patient will be seen tomorrow. Agree with lantus 20 units sq qhs and Humalog 5 units sq tid-ac for now. Would change to a moderate scale tid-ac/qhs.  Discussed with Endo Sofya and EAGLE Gonzales.  Ying Cordova MD  Pager: [phone], between 9am-6pm  Nights and Weekends: [phone]

## 2020-07-21 NOTE — PROGRESS NOTE ADULT - SUBJECTIVE AND OBJECTIVE BOX
Patient is a 65y old  Female who presents with a chief complaint of Fever (2020 20:06)      SUBJECTIVE / OVERNIGHT EVENTS:    MEDICATIONS  (STANDING):  dextrose 5%. 1000 milliLiter(s) (50 mL/Hr) IV Continuous <Continuous>  dextrose 50% Injectable 12.5 Gram(s) IV Push once  dextrose 50% Injectable 25 Gram(s) IV Push once  dextrose 50% Injectable 25 Gram(s) IV Push once  heparin   Injectable 5000 Unit(s) SubCutaneous every 8 hours  insulin glargine Injectable (LANTUS) 20 Unit(s) SubCutaneous at bedtime  insulin lispro (HumaLOG) corrective regimen sliding scale   SubCutaneous three times a day before meals  insulin lispro Injectable (HumaLOG) 5 Unit(s) SubCutaneous three times a day before meals  piperacillin/tazobactam IVPB.. 3.375 Gram(s) IV Intermittent every 6 hours  vancomycin  IVPB 1500 milliGRAM(s) IV Intermittent every 24 hours    MEDICATIONS  (PRN):  clonazePAM  Tablet 0.5 milliGRAM(s) Oral daily PRN Anxiety  dextrose 40% Gel 15 Gram(s) Oral once PRN Blood Glucose LESS THAN 70 milliGRAM(s)/deciliter  glucagon  Injectable 1 milliGRAM(s) IntraMuscular once PRN Glucose LESS THAN 70 milligrams/deciliter      Vital Signs Last 24 Hrs  T(C): 36.6 (2020 05:49), Max: 37 (2020 18:15)  T(F): 97.9 (2020 05:49), Max: 98.6 (2020 18:15)  HR: 71 (2020 05:49) (71 - 98)  BP: 120/63 (2020 05:49) (100/56 - 150/61)  BP(mean): --  RR: 18 (2020 05:49) (18 - 20)  SpO2: 95% (2020 05:49) (88% - 98%)  CAPILLARY BLOOD GLUCOSE      POCT Blood Glucose.: 123 mg/dL (2020 09:19)  POCT Blood Glucose.: 85 mg/dL (2020 08:47)  POCT Blood Glucose.: 67 mg/dL (2020 08:23)  POCT Blood Glucose.: 101 mg/dL (2020 21:44)  POCT Blood Glucose.: 118 mg/dL (2020 17:26)  POCT Blood Glucose.: 115 mg/dL (2020 15:58)  POCT Blood Glucose.: 81 mg/dL (2020 14:40)  POCT Blood Glucose.: 48 mg/dL (2020 13:51)  POCT Blood Glucose.: 48 mg/dL (2020 13:49)    I&O's Summary    2020 07:01  -  2020 07:00  --------------------------------------------------------  IN: 700 mL / OUT: 600 mL / NET: 100 mL        PHYSICAL EXAM:  GENERAL: NAD, well-developed  HEAD:  Atraumatic, Normocephalic  EYES: EOMI, PERRLA, conjunctiva and sclera clear  NECK: Supple, No JVD  CHEST/LUNG: Clear to auscultation bilaterally; No wheeze  HEART: Regular rate and rhythm; No murmurs, rubs, or gallops  ABDOMEN: Soft, Nontender, Nondistended; Bowel sounds present  EXTREMITIES:  2+ Peripheral Pulses, No clubbing, cyanosis, or edema  PSYCH: AAOx3  NEUROLOGY: non-focal  SKIN: No rashes or lesions    LABS:                        12.0   10.88 )-----------( 242      ( 2020 07:13 )             39.1     07-21    142  |  100  |  27<H>  ----------------------------<  52<L>  4.3   |  29  |  2.05<H>    Ca    9.5      2020 07:13  Phos  4.1     07-21  Mg     2.2     07-21    TPro  6.4  /  Alb  3.4  /  TBili  0.7  /  DBili  x   /  AST  17  /  ALT  39  /  AlkPhos  209<H>  07-21      CARDIAC MARKERS ( 2020 12:46 )  x     / x     / 152 U/L / x     / 3.8 ng/mL      Urinalysis Basic - ( 2020 17:38 )    Color: Light Yellow / Appearance: Clear / S.007 / pH: x  Gluc: x / Ketone: Negative  / Bili: Negative / Urobili: Negative   Blood: x / Protein: Negative / Nitrite: Negative   Leuk Esterase: Negative / RBC: x / WBC x   Sq Epi: x / Non Sq Epi: x / Bacteria: x        RADIOLOGY & ADDITIONAL TESTS:    Imaging Personally Reviewed:    Consultant(s) Notes Reviewed:      Care Discussed with Consultants/Other Providers: Patient is a 65y old  Female who presents with a chief complaint of Fever (2020 20:06)      SUBJECTIVE / OVERNIGHT EVENTS: Pt seen and examined. No acute events overnight. She denies RLE pain at this time. She denies fever/chills.    MEDICATIONS  (STANDING):  dextrose 5%. 1000 milliLiter(s) (50 mL/Hr) IV Continuous <Continuous>  dextrose 50% Injectable 12.5 Gram(s) IV Push once  dextrose 50% Injectable 25 Gram(s) IV Push once  dextrose 50% Injectable 25 Gram(s) IV Push once  heparin   Injectable 5000 Unit(s) SubCutaneous every 8 hours  insulin glargine Injectable (LANTUS) 20 Unit(s) SubCutaneous at bedtime  insulin lispro (HumaLOG) corrective regimen sliding scale   SubCutaneous three times a day before meals  insulin lispro Injectable (HumaLOG) 5 Unit(s) SubCutaneous three times a day before meals  piperacillin/tazobactam IVPB.. 3.375 Gram(s) IV Intermittent every 6 hours  vancomycin  IVPB 1500 milliGRAM(s) IV Intermittent every 24 hours    MEDICATIONS  (PRN):  clonazePAM  Tablet 0.5 milliGRAM(s) Oral daily PRN Anxiety  dextrose 40% Gel 15 Gram(s) Oral once PRN Blood Glucose LESS THAN 70 milliGRAM(s)/deciliter  glucagon  Injectable 1 milliGRAM(s) IntraMuscular once PRN Glucose LESS THAN 70 milligrams/deciliter      Vital Signs Last 24 Hrs  T(C): 36.6 (2020 05:49), Max: 37 (2020 18:15)  T(F): 97.9 (2020 05:49), Max: 98.6 (2020 18:15)  HR: 71 (2020 05:49) (71 - 98)  BP: 120/63 (2020 05:49) (100/56 - 150/61)  BP(mean): --  RR: 18 (2020 05:49) (18 - 20)  SpO2: 95% (2020 05:49) (88% - 98%)  CAPILLARY BLOOD GLUCOSE      POCT Blood Glucose.: 123 mg/dL (2020 09:19)  POCT Blood Glucose.: 85 mg/dL (2020 08:47)  POCT Blood Glucose.: 67 mg/dL (2020 08:23)  POCT Blood Glucose.: 101 mg/dL (2020 21:44)  POCT Blood Glucose.: 118 mg/dL (2020 17:26)  POCT Blood Glucose.: 115 mg/dL (2020 15:58)  POCT Blood Glucose.: 81 mg/dL (2020 14:40)  POCT Blood Glucose.: 48 mg/dL (2020 13:51)  POCT Blood Glucose.: 48 mg/dL (2020 13:49)    I&O's Summary    2020 07:01  -  2020 07:00  --------------------------------------------------------  IN: 700 mL / OUT: 600 mL / NET: 100 mL        PHYSICAL EXAM:  GENERAL: NAD, anicteric, afebrile  HEAD:  Atraumatic, Normocephalic  EYES: EOMI, PERRLA, conjunctiva and sclera clear  NECK: Supple, No JVD  CHEST/LUNG: Clear to auscultation bilaterally; No wheeze  HEART: Regular rate and rhythm; No murmurs, rubs, or gallops  ABDOMEN: Soft, Nontender, Nondistended; Bowel sounds present  EXTREMITIES:  2+ Peripheral Pulses, No clubbing, cyanosis, or edema  PSYCH: AAOx3  NEUROLOGY: non-focal  SKIN: No rashes or lesions    LABS:                        12.0   10.88 )-----------( 242      ( 2020 07:13 )             39.1     07-21    142  |  100  |  27<H>  ----------------------------<  52<L>  4.3   |  29  |  2.05<H>    Ca    9.5      2020 07:13  Phos  4.1     -  Mg     2.2         TPro  6.4  /  Alb  3.4  /  TBili  0.7  /  DBili  x   /  AST  17  /  ALT  39  /  AlkPhos  209<H>  07-21      CARDIAC MARKERS ( 2020 12:46 )  x     / x     / 152 U/L / x     / 3.8 ng/mL      Urinalysis Basic - ( 2020 17:38 )    Color: Light Yellow / Appearance: Clear / S.007 / pH: x  Gluc: x / Ketone: Negative  / Bili: Negative / Urobili: Negative   Blood: x / Protein: Negative / Nitrite: Negative   Leuk Esterase: Negative / RBC: x / WBC x   Sq Epi: x / Non Sq Epi: x / Bacteria: x        RADIOLOGY & ADDITIONAL TESTS:    Imaging Personally Reviewed:    Consultant(s) Notes Reviewed:      Care Discussed with Consultants/Other Providers: Patient is a 65y old  Female who presents with a chief complaint of Fever (2020 20:06)      SUBJECTIVE / OVERNIGHT EVENTS: Pt seen and examined. No acute events overnight. She denies RLE pain at this time. She denies fever/chills. She does c/o wheezing ; denies sob.    MEDICATIONS  (STANDING):  dextrose 5%. 1000 milliLiter(s) (50 mL/Hr) IV Continuous <Continuous>  dextrose 50% Injectable 12.5 Gram(s) IV Push once  dextrose 50% Injectable 25 Gram(s) IV Push once  dextrose 50% Injectable 25 Gram(s) IV Push once  heparin   Injectable 5000 Unit(s) SubCutaneous every 8 hours  insulin glargine Injectable (LANTUS) 20 Unit(s) SubCutaneous at bedtime  insulin lispro (HumaLOG) corrective regimen sliding scale   SubCutaneous three times a day before meals  insulin lispro Injectable (HumaLOG) 5 Unit(s) SubCutaneous three times a day before meals  piperacillin/tazobactam IVPB.. 3.375 Gram(s) IV Intermittent every 6 hours  vancomycin  IVPB 1500 milliGRAM(s) IV Intermittent every 24 hours    MEDICATIONS  (PRN):  clonazePAM  Tablet 0.5 milliGRAM(s) Oral daily PRN Anxiety  dextrose 40% Gel 15 Gram(s) Oral once PRN Blood Glucose LESS THAN 70 milliGRAM(s)/deciliter  glucagon  Injectable 1 milliGRAM(s) IntraMuscular once PRN Glucose LESS THAN 70 milligrams/deciliter      Vital Signs Last 24 Hrs  T(C): 36.6 (2020 05:49), Max: 37 (2020 18:15)  T(F): 97.9 (2020 05:49), Max: 98.6 (2020 18:15)  HR: 71 (2020 05:49) (71 - 98)  BP: 120/63 (2020 05:49) (100/56 - 150/61)  BP(mean): --  RR: 18 (2020 05:49) (18 - 20)  SpO2: 95% (2020 05:49) (88% - 98%)  CAPILLARY BLOOD GLUCOSE      POCT Blood Glucose.: 123 mg/dL (2020 09:19)  POCT Blood Glucose.: 85 mg/dL (2020 08:47)  POCT Blood Glucose.: 67 mg/dL (2020 08:23)  POCT Blood Glucose.: 101 mg/dL (2020 21:44)  POCT Blood Glucose.: 118 mg/dL (2020 17:26)  POCT Blood Glucose.: 115 mg/dL (2020 15:58)  POCT Blood Glucose.: 81 mg/dL (2020 14:40)  POCT Blood Glucose.: 48 mg/dL (2020 13:51)  POCT Blood Glucose.: 48 mg/dL (2020 13:49)    I&O's Summary    2020 07:01  -  2020 07:00  --------------------------------------------------------  IN: 700 mL / OUT: 600 mL / NET: 100 mL        PHYSICAL EXAM:  GENERAL: NAD,obese, anicteric, afebrile  HEAD:  Atraumatic, Normocephalic  EYES: EOMI, PERRLA, conjunctiva and sclera clear  NECK: Supple, No JVD  CHEST/LUNG: Clear to auscultation bilaterally; mild wheeze  HEART: Regular rate and rhythm; No murmurs, rubs, or gallops  ABDOMEN: Soft, Nontender, Nondistended; Bowel sounds present  EXTREMITIES:  3+ pitting LE edema, RLE dressing cdi, chronic skin changes on b/l shins  PSYCH: calm, appropriate affect  NEUROLOGY: AAOx3  SKIN: No rashes or lesions    LABS:                        12.0   10.88 )-----------( 242      ( 2020 07:13 )             39.1     07-21    142  |  100  |  27<H>  ----------------------------<  52<L>  4.3   |  29  |  2.05<H>    Ca    9.5      2020 07:13  Phos  4.1     07-  Mg     2.2     -    TPro  6.4  /  Alb  3.4  /  TBili  0.7  /  DBili  x   /  AST  17  /  ALT  39  /  AlkPhos  209<H>  07-21      CARDIAC MARKERS ( 2020 12:46 )  x     / x     / 152 U/L / x     / 3.8 ng/mL      Urinalysis Basic - ( 2020 17:38 )    Color: Light Yellow / Appearance: Clear / S.007 / pH: x  Gluc: x / Ketone: Negative  / Bili: Negative / Urobili: Negative   Blood: x / Protein: Negative / Nitrite: Negative   Leuk Esterase: Negative / RBC: x / WBC x   Sq Epi: x / Non Sq Epi: x / Bacteria: x          Consultant(s) Notes Reviewed:  Podiatry

## 2020-07-22 DIAGNOSIS — E11.649 TYPE 2 DIABETES MELLITUS WITH HYPOGLYCEMIA WITHOUT COMA: ICD-10-CM

## 2020-07-22 DIAGNOSIS — I10 ESSENTIAL (PRIMARY) HYPERTENSION: ICD-10-CM

## 2020-07-22 DIAGNOSIS — E78.5 HYPERLIPIDEMIA, UNSPECIFIED: ICD-10-CM

## 2020-07-22 LAB
-  AMPICILLIN/SULBACTAM: SIGNIFICANT CHANGE UP
-  CEFAZOLIN: SIGNIFICANT CHANGE UP
-  CLINDAMYCIN: SIGNIFICANT CHANGE UP
-  ERYTHROMYCIN: SIGNIFICANT CHANGE UP
-  GENTAMICIN: SIGNIFICANT CHANGE UP
-  OXACILLIN: SIGNIFICANT CHANGE UP
-  PENICILLIN: SIGNIFICANT CHANGE UP
-  RIFAMPIN: SIGNIFICANT CHANGE UP
-  TETRACYCLINE: SIGNIFICANT CHANGE UP
-  TRIMETHOPRIM/SULFAMETHOXAZOLE: SIGNIFICANT CHANGE UP
-  VANCOMYCIN: SIGNIFICANT CHANGE UP
ALBUMIN SERPL ELPH-MCNC: 3.5 G/DL — SIGNIFICANT CHANGE UP (ref 3.3–5)
ALP SERPL-CCNC: 184 U/L — HIGH (ref 40–120)
ALT FLD-CCNC: 28 U/L — SIGNIFICANT CHANGE UP (ref 10–45)
ANION GAP SERPL CALC-SCNC: 10 MMOL/L — SIGNIFICANT CHANGE UP (ref 5–17)
AST SERPL-CCNC: 15 U/L — SIGNIFICANT CHANGE UP (ref 10–40)
BILIRUB SERPL-MCNC: 0.6 MG/DL — SIGNIFICANT CHANGE UP (ref 0.2–1.2)
BUN SERPL-MCNC: 28 MG/DL — HIGH (ref 7–23)
CALCIUM SERPL-MCNC: 9.2 MG/DL — SIGNIFICANT CHANGE UP (ref 8.4–10.5)
CHLORIDE SERPL-SCNC: 99 MMOL/L — SIGNIFICANT CHANGE UP (ref 96–108)
CO2 SERPL-SCNC: 29 MMOL/L — SIGNIFICANT CHANGE UP (ref 22–31)
CREAT SERPL-MCNC: 1.97 MG/DL — HIGH (ref 0.5–1.3)
CULTURE RESULTS: SIGNIFICANT CHANGE UP
GLUCOSE BLDC GLUCOMTR-MCNC: 157 MG/DL — HIGH (ref 70–99)
GLUCOSE BLDC GLUCOMTR-MCNC: 160 MG/DL — HIGH (ref 70–99)
GLUCOSE BLDC GLUCOMTR-MCNC: 223 MG/DL — HIGH (ref 70–99)
GLUCOSE BLDC GLUCOMTR-MCNC: 232 MG/DL — HIGH (ref 70–99)
GLUCOSE SERPL-MCNC: 191 MG/DL — HIGH (ref 70–99)
HCT VFR BLD CALC: 38.7 % — SIGNIFICANT CHANGE UP (ref 34.5–45)
HGB BLD-MCNC: 11.9 G/DL — SIGNIFICANT CHANGE UP (ref 11.5–15.5)
MCHC RBC-ENTMCNC: 29 PG — SIGNIFICANT CHANGE UP (ref 27–34)
MCHC RBC-ENTMCNC: 30.7 GM/DL — LOW (ref 32–36)
MCV RBC AUTO: 94.2 FL — SIGNIFICANT CHANGE UP (ref 80–100)
METHOD TYPE: SIGNIFICANT CHANGE UP
NRBC # BLD: 0 /100 WBCS — SIGNIFICANT CHANGE UP (ref 0–0)
ORGANISM # SPEC MICROSCOPIC CNT: SIGNIFICANT CHANGE UP
ORGANISM # SPEC MICROSCOPIC CNT: SIGNIFICANT CHANGE UP
PLATELET # BLD AUTO: 277 K/UL — SIGNIFICANT CHANGE UP (ref 150–400)
POTASSIUM SERPL-MCNC: 4.6 MMOL/L — SIGNIFICANT CHANGE UP (ref 3.5–5.3)
POTASSIUM SERPL-SCNC: 4.6 MMOL/L — SIGNIFICANT CHANGE UP (ref 3.5–5.3)
PROT SERPL-MCNC: 6.4 G/DL — SIGNIFICANT CHANGE UP (ref 6–8.3)
RBC # BLD: 4.11 M/UL — SIGNIFICANT CHANGE UP (ref 3.8–5.2)
RBC # FLD: 14.6 % — HIGH (ref 10.3–14.5)
SODIUM SERPL-SCNC: 138 MMOL/L — SIGNIFICANT CHANGE UP (ref 135–145)
SPECIMEN SOURCE: SIGNIFICANT CHANGE UP
VANCOMYCIN TROUGH SERPL-MCNC: 8.6 UG/ML — LOW (ref 10–20)
WBC # BLD: 9.15 K/UL — SIGNIFICANT CHANGE UP (ref 3.8–10.5)
WBC # FLD AUTO: 9.15 K/UL — SIGNIFICANT CHANGE UP (ref 3.8–10.5)

## 2020-07-22 PROCEDURE — 99233 SBSQ HOSP IP/OBS HIGH 50: CPT

## 2020-07-22 RX ORDER — ALBUTEROL 90 UG/1
2 AEROSOL, METERED ORAL EVERY 6 HOURS
Refills: 0 | Status: DISCONTINUED | OUTPATIENT
Start: 2020-07-22 | End: 2020-07-24

## 2020-07-22 RX ADMIN — Medication 5 UNIT(S): at 17:35

## 2020-07-22 RX ADMIN — Medication 5 UNIT(S): at 12:50

## 2020-07-22 RX ADMIN — Medication 2: at 08:34

## 2020-07-22 RX ADMIN — HEPARIN SODIUM 5000 UNIT(S): 5000 INJECTION INTRAVENOUS; SUBCUTANEOUS at 05:58

## 2020-07-22 RX ADMIN — BUDESONIDE AND FORMOTEROL FUMARATE DIHYDRATE 2 PUFF(S): 160; 4.5 AEROSOL RESPIRATORY (INHALATION) at 05:58

## 2020-07-22 RX ADMIN — HEPARIN SODIUM 5000 UNIT(S): 5000 INJECTION INTRAVENOUS; SUBCUTANEOUS at 15:07

## 2020-07-22 RX ADMIN — PIPERACILLIN AND TAZOBACTAM 25 GRAM(S): 4; .5 INJECTION, POWDER, LYOPHILIZED, FOR SOLUTION INTRAVENOUS at 23:48

## 2020-07-22 RX ADMIN — Medication 0.5 MILLIGRAM(S): at 22:25

## 2020-07-22 RX ADMIN — BUDESONIDE AND FORMOTEROL FUMARATE DIHYDRATE 2 PUFF(S): 160; 4.5 AEROSOL RESPIRATORY (INHALATION) at 17:36

## 2020-07-22 RX ADMIN — HEPARIN SODIUM 5000 UNIT(S): 5000 INJECTION INTRAVENOUS; SUBCUTANEOUS at 22:25

## 2020-07-22 RX ADMIN — Medication 4: at 17:35

## 2020-07-22 RX ADMIN — Medication 2: at 12:50

## 2020-07-22 RX ADMIN — Medication 5 UNIT(S): at 08:34

## 2020-07-22 RX ADMIN — Medication 81 MILLIGRAM(S): at 11:48

## 2020-07-22 RX ADMIN — Medication 300 MILLIGRAM(S): at 21:15

## 2020-07-22 RX ADMIN — INSULIN GLARGINE 20 UNIT(S): 100 INJECTION, SOLUTION SUBCUTANEOUS at 22:25

## 2020-07-22 RX ADMIN — PIPERACILLIN AND TAZOBACTAM 25 GRAM(S): 4; .5 INJECTION, POWDER, LYOPHILIZED, FOR SOLUTION INTRAVENOUS at 11:38

## 2020-07-22 RX ADMIN — PIPERACILLIN AND TAZOBACTAM 25 GRAM(S): 4; .5 INJECTION, POWDER, LYOPHILIZED, FOR SOLUTION INTRAVENOUS at 04:40

## 2020-07-22 RX ADMIN — POLYETHYLENE GLYCOL 3350 17 GRAM(S): 17 POWDER, FOR SOLUTION ORAL at 11:48

## 2020-07-22 RX ADMIN — Medication 1 APPLICATION(S): at 11:39

## 2020-07-22 RX ADMIN — OXYCODONE HYDROCHLORIDE 80 MILLIGRAM(S): 5 TABLET ORAL at 17:33

## 2020-07-22 RX ADMIN — Medication 1000 UNIT(S): at 11:48

## 2020-07-22 RX ADMIN — OXYCODONE HYDROCHLORIDE 80 MILLIGRAM(S): 5 TABLET ORAL at 05:59

## 2020-07-22 RX ADMIN — ATORVASTATIN CALCIUM 80 MILLIGRAM(S): 80 TABLET, FILM COATED ORAL at 22:25

## 2020-07-22 NOTE — PHYSICAL THERAPY INITIAL EVALUATION ADULT - ACTIVE RANGE OF MOTION EXAMINATION, REHAB EVAL
bilateral upper extremity Active ROM was WFL (within functional limits)/Decreased R hip/knee flexion, L hip/knee WFL, B knee ext WFL. Also limited ROM due to abdominal pannus

## 2020-07-22 NOTE — PROGRESS NOTE ADULT - PROBLEM SELECTOR PLAN 1
-C/w broad spectrum: Vanc/zosyn  -Trend BMP given CKD w/possible JACQUES  - Bcx NGTD,  Ucx < 67949UMG, wound cx : rare stap aureus  -ESR elevated  -Patient is high risk for OM but  podiatry evaluation revealed wound to subcut only. - Antibiotics currently do cover OM. Will hold off MRI for now but consider if clinically worsening. -C/w broad spectrum: Vanc/zosyn  -Trend BMP given CKD w/possible JACQUES  - Bcx NGTD,  Ucx < [id number], wound cx : rare stap aureus  -ESR elevated  -No MRI needed as patient has active Charcot of R foot which would result in false positive  - per Podiatry pt is stable for d/c on PO abx

## 2020-07-22 NOTE — PHYSICAL THERAPY INITIAL EVALUATION ADULT - PERTINENT HX OF CURRENT PROBLEM, REHAB EVAL
Pt admitted with burst blister on R foot with fevers. Sent in by podiatrist. +infected right foot wound and cellulitis. Also with chronic SOB, L knee pain. Now on broad spectrum abx, cannot r/o OM.

## 2020-07-22 NOTE — PHYSICAL THERAPY INITIAL EVALUATION ADULT - LIVES WITH, PROFILE
Lives with her 2 sons in a house with a ramp entrance. + rolling walker (it is old and falling apart as per pt). Pt required assist for ADL's PTA. States she needs more help at home- inquiring about aide services. Limited ambulation to very short distances within the house

## 2020-07-22 NOTE — PHYSICAL THERAPY INITIAL EVALUATION ADULT - GAIT DEVIATIONS NOTED, PT EVAL
decreased stride length/decreased weight-shifting ability/decreased rito/decreased step length/unsteady but no LOB. + SOB. O2 sats 90-92% after mobilization

## 2020-07-22 NOTE — PROVIDER CONTACT NOTE (OTHER) - SITUATION
Patient's IV infiltrated this afternoon. Per AM nurse, IV team paged 3x, PM RN paged IV nurse 2x. Patient needs US guided IV placement.

## 2020-07-22 NOTE — CONSULT NOTE ADULT - PROBLEM SELECTOR RECOMMENDATION 9
A1C 7, w/ hypoglycemia during hospitalization and reported hypoglycemia at home.  FS's currently ok, w/o hypoglycemia, although elevated bedtime value yesterday to 200's. Will cont. current regimen for now:  -Lantus 20u qhs  -Humalog 5-5-5  -Mod dose SS TIDAC/qhd  -Carb consistent diet  -Monitor FS's TIDAC/qhs    D/C: Can f/u at [facility] faculty practice at [address] [place] [address]. Please call [phone] for appt. Can d/c on basal bolus vs. mixed insulin, depending on insurance coverage. Doses TBD.

## 2020-07-22 NOTE — CONSULT NOTE ADULT - ASSESSMENT
65 y.o. woman w DM2,  (A1c 11/2019 7.0) c/b neuropathy and charcot foot, morbid obesity w/likely OHS, CKD (Cr 1.64 Nov 2019), chronic venous stasis, spinal stenosis and cervical radiculopathy s/p ?partial repair 2002, homebound, endometrial CA 2006 s/p MEGAN/BSO, chronic back pain on opioids who presents with 5 days of fever and R foot pain. Endo consulted for DM management.

## 2020-07-22 NOTE — PROVIDER CONTACT NOTE (OTHER) - ACTION/TREATMENT ORDERED:
Oxygen 2 liters nasal cannula applied. Monitor SPO2
PA aware. Will keep PA updated and continue to monitor patient.

## 2020-07-22 NOTE — PHYSICAL THERAPY INITIAL EVALUATION ADULT - ADDITIONAL COMMENTS
Xray R foot: Charcot changes are again seen centered at Chopart's joint. No definite acute osseous destruction is appreciated.  MRI may be helpful for further evaluation as warranted.  s/p de-roofed blister with granular wound bed & healthy sanguinous drainage, improved erythema with no necrotic changes, healthy fibrogranular wound bed  Per podiatry: No MRI as patient has active Charcot of R foot which would result in false positive  Arterial dopplers RLE:  Significant right lower extremity arterial vascular disease is not identified.  B LE dopplers: negative DVT        CXR negative        Xray L knee negative

## 2020-07-22 NOTE — CONSULT NOTE ADULT - SUBJECTIVE AND OBJECTIVE BOX
HPI:  65 y.o. woman w morbid obesity w/likely OHS (suspected by outpatient pulm, but pending sleep study), DM2 on insulin (A1c 11/2019 7.0) c/b neuropathy and charcot foot, CKD (Cr 1.64 Nov 2019), chronic venous stasis, spinal stenosis and cervical radiculopathy s/p ?partial repair 2002, homebound, endometrial CA 2006 s/p MEGAN/BSO, chronic back pain on opioids who presents with 5 days of fever and R foot pain. Patient's R foot symptoms began 2w ago when she noted a blister on the posterior aspect of her R foot. She called her podiatrist who evaluated her at home and she was advised to monitor. 5d ago, patient developed a fever to 101.3, associated with nausea improved with zofran, chest pain, and R leg swelling and redness worse than her usual. While she was showering that day, the R foot blister burst open and began to drain blood but no pus. Over the next several days, she had daily fevers and continued to have drainage from the foot, therefore she presented to ED.  Patient has chronic LE edema 2/2 venous stasis, which is recently worsened as above. Also has chronic SOB worse at night but no increase recently; has seen pulm as outpatient, and PFTs revealed obstructive pattern (possible asthma) and she was started on inhalers and referred for sleep study for likely obesity hypoventilation syndrome. Has been evaluated by Cardiology and has echo 2019 w/EF 66%, normal LVSF.   In ED, VS: 98.2, 98, 150/61, 20, 98% on 3L NC. She as given lasix 40mg IV x1, vanc/zosyn x1, and oxycodone 20mg. Podiatry consulted and performed debridement of R foot. (20 Jul 2020 20:06)    Endo consulted for DM management. She has had DM2 25 years. Was 1st told she had GDM during a twin pregnancy and then developed DM2. Home regimen is Novolin 70/30 76u qam an 70u qhs as well as Humalog 5-5-5. Checks FS's and they run b/w , but does note frequent episodes where she feels lightheaded and diaphoretic. She reports having a small appetite and often will only eat fruit and a meals on wheels meal during the day. She is not active. She uses a walker to walk. She is overdue for optho. She has had cataracts and narrow angle glaucoma in the past, s/p lasert x. She has sx of neuropathy and PVD and has frequent podiatry home visits.    PAST MEDICAL & SURGICAL HISTORY:  CKD (chronic kidney disease)  Insomnia  Edema of lower extremity: on lasix  Vitamin D deficiency  Morbid Obesity  Cataract  Dyslipidemia  Deep Vein Thrombosis (DVT): Left leg, 2004, treated and resolved  Spinal Stenosis, Lumbar  Cervical Stenosis of Spine  Endometrial Hyperplasia  HTN (Hypertension)  Diabetes Mellitus Type II  S/P cholecystectomy  S/P appendectomy  S/P total abdominal hysterectomy and bilateral salpingo-oophorectomy  H/O colonoscopy: 1998  H/O laser iridotomy: left eye, 2016  Endometrial biopsy 12/02/09  Tonsillectomy as a child  Cervical Spinal Stenosis surgery x2 (01/2002, 7/2002)  D&C 2008: hysteroscopy, endometrial hyperplasia, 2009  D&C x2 1980's  Cholecystectomy/appendectomy @ age 25  C Section 1994  Cataract extracted with lens implant 1998: Right      FAMILY HISTORY:  Family history of lung cancer (Grandparent)  Family history of bladder cancer  Family history of pancreatic cancer      Social History:  Lives w/ sons  No toxic habits    Outpatient Medications:  As per HPI    MEDICATIONS  (STANDING):  aspirin enteric coated 81 milliGRAM(s) Oral daily  atorvastatin 80 milliGRAM(s) Oral at bedtime  budesonide 160 MICROgram(s)/formoterol 4.5 MICROgram(s) Inhaler 2 Puff(s) Inhalation two times a day  cholecalciferol 1000 Unit(s) Oral daily  collagenase Ointment 1 Application(s) Topical daily  dextrose 5%. 1000 milliLiter(s) (50 mL/Hr) IV Continuous <Continuous>  dextrose 50% Injectable 12.5 Gram(s) IV Push once  dextrose 50% Injectable 25 Gram(s) IV Push once  dextrose 50% Injectable 25 Gram(s) IV Push once  heparin   Injectable 5000 Unit(s) SubCutaneous every 8 hours  insulin glargine Injectable (LANTUS) 20 Unit(s) SubCutaneous at bedtime  insulin lispro (HumaLOG) corrective regimen sliding scale   SubCutaneous three times a day before meals  insulin lispro (HumaLOG) corrective regimen sliding scale   SubCutaneous at bedtime  insulin lispro Injectable (HumaLOG) 5 Unit(s) SubCutaneous three times a day before meals  oxyCODONE  ER Tablet 80 milliGRAM(s) Oral every 12 hours  piperacillin/tazobactam IVPB.. 3.375 Gram(s) IV Intermittent every 6 hours  polyethylene glycol 3350 17 Gram(s) Oral daily  vancomycin  IVPB 1500 milliGRAM(s) IV Intermittent every 24 hours    MEDICATIONS  (PRN):  clonazePAM  Tablet 0.5 milliGRAM(s) Oral daily PRN Anxiety  dextrose 40% Gel 15 Gram(s) Oral once PRN Blood Glucose LESS THAN 70 milliGRAM(s)/deciliter  glucagon  Injectable 1 milliGRAM(s) IntraMuscular once PRN Glucose LESS THAN 70 milligrams/deciliter      Allergies    adhesives (Rash)  latex (Rash)  No Known Drug Allergies  wool- rash, itch (Other)      Review of Systems:  Constitutional: + fever, decreased appetite  Eyes: No blurry vision  Neuro: No tremors  HEENT: No pain  Cardiovascular: No chest pain, palpitations  Respiratory: No SOB, no cough  GI: No nausea, vomiting, abdominal pain  : No dysuria  Skin: + rash  Psych: no depression  Endocrine: no polyuria, polydipsia  Hem/lymph: + LE  swelling  Osteoporosis: +foot pain  Neuro: +neuropathy    ALL OTHER SYSTEMS REVIEWED AND NEGATIVE    UNABLE TO OBTAIN    PHYSICAL EXAM:  VITALS: T(C): 36.9 (07-22-20 @ 05:14)  T(F): 98.5 (07-22-20 @ 05:14), Max: 98.6 (07-21-20 @ 14:20)  HR: 75 (07-22-20 @ 05:14) (74 - 81)  BP: 119/62 (07-22-20 @ 05:14) (109/66 - 144/70)  RR:  (18 - 19)  SpO2:  (96% - 97%)  Wt(kg): --  GENERAL: NAD, well-groomed, obese  EYES: No proptosis, anicteric  HEENT:  Atraumatic, Normocephalic, moist mucous membranes  THYROID: Normal size, no palpable nodules  RESPIRATORY: Clear to auscultation bilaterally  CARDIOVASCULAR: Regular rate and rhythm; + LE peripheral edema  GI: Soft, nontender, non distended, normal bowel sounds  SKIN: Dry, intact, +VENOSTASIS CHANGES/ ERYTHEMA/ULCERATIONS ON LE and feet b/l  MUSCULOSKELETAL: +GAUGE/BANDAGE r FOOT  NEURO: no tremor  PSYCH: Alert and oriented x 3, normal affect, normal mood      POCT Blood Glucose.: 157 mg/dL (07-22-20 @ 08:29)  POCT Blood Glucose.: 287 mg/dL (07-21-20 @ 22:04)  POCT Blood Glucose.: 242 mg/dL (07-21-20 @ 19:56)  POCT Blood Glucose.: 165 mg/dL (07-21-20 @ 17:06)  POCT Blood Glucose.: 128 mg/dL (07-21-20 @ 13:20)  POCT Blood Glucose.: 123 mg/dL (07-21-20 @ 09:19)  POCT Blood Glucose.: 85 mg/dL (07-21-20 @ 08:47)  POCT Blood Glucose.: 67 mg/dL (07-21-20 @ 08:23)  POCT Blood Glucose.: 101 mg/dL (07-20-20 @ 21:44)  POCT Blood Glucose.: 118 mg/dL (07-20-20 @ 17:26)  POCT Blood Glucose.: 115 mg/dL (07-20-20 @ 15:58)  POCT Blood Glucose.: 81 mg/dL (07-20-20 @ 14:40)  POCT Blood Glucose.: 48 mg/dL (07-20-20 @ 13:51)  POCT Blood Glucose.: 48 mg/dL (07-20-20 @ 13:49)                            11.9   9.15  )-----------( 277      ( 22 Jul 2020 07:06 )             38.7       07-22    138  |  99  |  28<H>  ----------------------------<  191<H>  4.6   |  29  |  1.97<H>    EGFR if : 30<L>  EGFR if non : 26<L>    Ca    9.2      07-22  Mg     2.2     07-21  Phos  4.1     07-21    TPro  6.4  /  Alb  3.5  /  TBili  0.6  /  DBili  x   /  AST  15  /  ALT  28  /  AlkPhos  184<H>  07-22      Thyroid Function Tests:              Radiology:

## 2020-07-22 NOTE — PROGRESS NOTE ADULT - SUBJECTIVE AND OBJECTIVE BOX
Patient is a 65y old  Female who presents with a chief complaint of Fever (2020 15:33)      SUBJECTIVE / OVERNIGHT EVENTS:    MEDICATIONS  (STANDING):  aspirin enteric coated 81 milliGRAM(s) Oral daily  atorvastatin 80 milliGRAM(s) Oral at bedtime  budesonide 160 MICROgram(s)/formoterol 4.5 MICROgram(s) Inhaler 2 Puff(s) Inhalation two times a day  cholecalciferol 1000 Unit(s) Oral daily  collagenase Ointment 1 Application(s) Topical daily  dextrose 5%. 1000 milliLiter(s) (50 mL/Hr) IV Continuous <Continuous>  dextrose 50% Injectable 12.5 Gram(s) IV Push once  dextrose 50% Injectable 25 Gram(s) IV Push once  dextrose 50% Injectable 25 Gram(s) IV Push once  heparin   Injectable 5000 Unit(s) SubCutaneous every 8 hours  insulin glargine Injectable (LANTUS) 20 Unit(s) SubCutaneous at bedtime  insulin lispro (HumaLOG) corrective regimen sliding scale   SubCutaneous three times a day before meals  insulin lispro (HumaLOG) corrective regimen sliding scale   SubCutaneous at bedtime  insulin lispro Injectable (HumaLOG) 5 Unit(s) SubCutaneous three times a day before meals  oxyCODONE  ER Tablet 80 milliGRAM(s) Oral every 12 hours  piperacillin/tazobactam IVPB.. 3.375 Gram(s) IV Intermittent every 6 hours  polyethylene glycol 3350 17 Gram(s) Oral daily  vancomycin  IVPB 1500 milliGRAM(s) IV Intermittent every 24 hours    MEDICATIONS  (PRN):  clonazePAM  Tablet 0.5 milliGRAM(s) Oral daily PRN Anxiety  dextrose 40% Gel 15 Gram(s) Oral once PRN Blood Glucose LESS THAN 70 milliGRAM(s)/deciliter  glucagon  Injectable 1 milliGRAM(s) IntraMuscular once PRN Glucose LESS THAN 70 milligrams/deciliter      Vital Signs Last 24 Hrs  T(C): 36.9 (2020 05:14), Max: 37 (2020 14:20)  T(F): 98.5 (2020 05:14), Max: 98.6 (2020 14:20)  HR: 75 (2020 05:14) (74 - 81)  BP: 119/62 (2020 05:14) (109/66 - 144/70)  BP(mean): --  RR: 19 (2020 05:14) (18 - 19)  SpO2: 97% (2020 05:14) (96% - 97%)  CAPILLARY BLOOD GLUCOSE      POCT Blood Glucose.: 157 mg/dL (2020 08:29)  POCT Blood Glucose.: 287 mg/dL (2020 22:04)  POCT Blood Glucose.: 242 mg/dL (2020 19:56)  POCT Blood Glucose.: 165 mg/dL (2020 17:06)  POCT Blood Glucose.: 128 mg/dL (2020 13:20)    I&O's Summary    2020 07:01  -  2020 07:00  --------------------------------------------------------  IN: 1280 mL / OUT: 600 mL / NET: 680 mL        PHYSICAL EXAM:  GENERAL: NAD, well-developed  HEAD:  Atraumatic, Normocephalic  EYES: EOMI, PERRLA, conjunctiva and sclera clear  NECK: Supple, No JVD  CHEST/LUNG: Clear to auscultation bilaterally; No wheeze  HEART: Regular rate and rhythm; No murmurs, rubs, or gallops  ABDOMEN: Soft, Nontender, Nondistended; Bowel sounds present  EXTREMITIES:  2+ Peripheral Pulses, No clubbing, cyanosis, or edema  PSYCH: AAOx3  NEUROLOGY: non-focal  SKIN: No rashes or lesions    LABS:                        11.9   9.15  )-----------( 277      ( 2020 07:06 )             38.7     07-22    138  |  99  |  28<H>  ----------------------------<  191<H>  4.6   |  29  |  1.97<H>    Ca    9.2      2020 07:06  Phos  4.1     07-21  Mg     2.2     -    TPro  6.4  /  Alb  3.5  /  TBili  0.6  /  DBili  x   /  AST  15  /  ALT  28  /  AlkPhos  184<H>  07-22      CARDIAC MARKERS ( 2020 12:46 )  x     / x     / 152 U/L / x     / 3.8 ng/mL      Urinalysis Basic - ( 2020 17:38 )    Color: Light Yellow / Appearance: Clear / S.007 / pH: x  Gluc: x / Ketone: Negative  / Bili: Negative / Urobili: Negative   Blood: x / Protein: Negative / Nitrite: Negative   Leuk Esterase: Negative / RBC: x / WBC x   Sq Epi: x / Non Sq Epi: x / Bacteria: x        RADIOLOGY & ADDITIONAL TESTS:    Imaging Personally Reviewed:    Consultant(s) Notes Reviewed:      Care Discussed with Consultants/Other Providers: Patient is a 65y old  Female who presents with a chief complaint of Fever (2020 15:33)      SUBJECTIVE / OVERNIGHT EVENTS: Pt seen and examined. No acute events overnight. She reports RLE swelling and skin feeling "tight". Denies sob, fever/chills.    MEDICATIONS  (STANDING):  aspirin enteric coated 81 milliGRAM(s) Oral daily  atorvastatin 80 milliGRAM(s) Oral at bedtime  budesonide 160 MICROgram(s)/formoterol 4.5 MICROgram(s) Inhaler 2 Puff(s) Inhalation two times a day  cholecalciferol 1000 Unit(s) Oral daily  collagenase Ointment 1 Application(s) Topical daily  dextrose 5%. 1000 milliLiter(s) (50 mL/Hr) IV Continuous <Continuous>  dextrose 50% Injectable 12.5 Gram(s) IV Push once  dextrose 50% Injectable 25 Gram(s) IV Push once  dextrose 50% Injectable 25 Gram(s) IV Push once  heparin   Injectable 5000 Unit(s) SubCutaneous every 8 hours  insulin glargine Injectable (LANTUS) 20 Unit(s) SubCutaneous at bedtime  insulin lispro (HumaLOG) corrective regimen sliding scale   SubCutaneous three times a day before meals  insulin lispro (HumaLOG) corrective regimen sliding scale   SubCutaneous at bedtime  insulin lispro Injectable (HumaLOG) 5 Unit(s) SubCutaneous three times a day before meals  oxyCODONE  ER Tablet 80 milliGRAM(s) Oral every 12 hours  piperacillin/tazobactam IVPB.. 3.375 Gram(s) IV Intermittent every 6 hours  polyethylene glycol 3350 17 Gram(s) Oral daily  vancomycin  IVPB 1500 milliGRAM(s) IV Intermittent every 24 hours    MEDICATIONS  (PRN):  clonazePAM  Tablet 0.5 milliGRAM(s) Oral daily PRN Anxiety  dextrose 40% Gel 15 Gram(s) Oral once PRN Blood Glucose LESS THAN 70 milliGRAM(s)/deciliter  glucagon  Injectable 1 milliGRAM(s) IntraMuscular once PRN Glucose LESS THAN 70 milligrams/deciliter      Vital Signs Last 24 Hrs  T(C): 36.9 (2020 05:14), Max: 37 (2020 14:20)  T(F): 98.5 (2020 05:14), Max: 98.6 (2020 14:20)  HR: 75 (2020 05:14) (74 - 81)  BP: 119/62 (2020 05:14) (109/66 - 144/70)  BP(mean): --  RR: 19 (2020 05:14) (18 - 19)  SpO2: 97% (2020 05:14) (96% - 97%)  CAPILLARY BLOOD GLUCOSE      POCT Blood Glucose.: 157 mg/dL (2020 08:29)  POCT Blood Glucose.: 287 mg/dL (2020 22:04)  POCT Blood Glucose.: 242 mg/dL (2020 19:56)  POCT Blood Glucose.: 165 mg/dL (2020 17:06)  POCT Blood Glucose.: 128 mg/dL (2020 13:20)    I&O's Summary    2020 07:01  -  2020 07:00  --------------------------------------------------------  IN: 1280 mL / OUT: 600 mL / NET: 680 mL        PHYSICAL EXAM:  GENERAL: NAD,obese, anicteric, afebrile  HEAD:  Atraumatic, Normocephalic  EYES: EOMI, PERRLA, conjunctiva and sclera clear  NECK: Supple, No JVD  CHEST/LUNG: Clear to auscultation bilaterally; mild wheeze  HEART: Regular rate and rhythm; No murmurs, rubs, or gallops  ABDOMEN: Soft, Nontender, Nondistended; Bowel sounds present  EXTREMITIES:  3+ pitting LE edema, RLE dressing cdi, chronic skin changes on b/l shins  PSYCH: calm, appropriate affect  NEUROLOGY: AAOx3  SKIN: No rashes or lesions    LABS:                        11.9   9.15  )-----------( 277      ( 2020 07:06 )             38.7     07-22    138  |  99  |  28<H>  ----------------------------<  191<H>  4.6   |  29  |  1.97<H>    Ca    9.2      2020 07:06  Phos  4.1     07-21  Mg     2.2     07-21    TPro  6.4  /  Alb  3.5  /  TBili  0.6  /  DBili  x   /  AST  15  /  ALT  28  /  AlkPhos  184<H>        CARDIAC MARKERS ( 2020 12:46 )  x     / x     / 152 U/L / x     / 3.8 ng/mL      Urinalysis Basic - ( 2020 17:38 )    Color: Light Yellow / Appearance: Clear / S.007 / pH: x  Gluc: x / Ketone: Negative  / Bili: Negative / Urobili: Negative   Blood: x / Protein: Negative / Nitrite: Negative   Leuk Esterase: Negative / RBC: x / WBC x   Sq Epi: x / Non Sq Epi: x / Bacteria: x        Consultant(s) Notes Reviewed:  Endocrinology

## 2020-07-23 ENCOUNTER — TRANSCRIPTION ENCOUNTER (OUTPATIENT)
Age: 65
End: 2020-07-23

## 2020-07-23 VITALS
OXYGEN SATURATION: 95 % | HEART RATE: 73 BPM | TEMPERATURE: 98 F | RESPIRATION RATE: 18 BRPM | DIASTOLIC BLOOD PRESSURE: 71 MMHG | SYSTOLIC BLOOD PRESSURE: 152 MMHG

## 2020-07-23 LAB
ALBUMIN SERPL ELPH-MCNC: 3.5 G/DL — SIGNIFICANT CHANGE UP (ref 3.3–5)
ALP SERPL-CCNC: 172 U/L — HIGH (ref 40–120)
ALT FLD-CCNC: 25 U/L — SIGNIFICANT CHANGE UP (ref 10–45)
ANION GAP SERPL CALC-SCNC: 12 MMOL/L — SIGNIFICANT CHANGE UP (ref 5–17)
AST SERPL-CCNC: 14 U/L — SIGNIFICANT CHANGE UP (ref 10–40)
BILIRUB SERPL-MCNC: 0.6 MG/DL — SIGNIFICANT CHANGE UP (ref 0.2–1.2)
BUN SERPL-MCNC: 30 MG/DL — HIGH (ref 7–23)
CALCIUM SERPL-MCNC: 9.5 MG/DL — SIGNIFICANT CHANGE UP (ref 8.4–10.5)
CHLORIDE SERPL-SCNC: 100 MMOL/L — SIGNIFICANT CHANGE UP (ref 96–108)
CO2 SERPL-SCNC: 27 MMOL/L — SIGNIFICANT CHANGE UP (ref 22–31)
CREAT SERPL-MCNC: 1.81 MG/DL — HIGH (ref 0.5–1.3)
GLUCOSE BLDC GLUCOMTR-MCNC: 180 MG/DL — HIGH (ref 70–99)
GLUCOSE BLDC GLUCOMTR-MCNC: 208 MG/DL — HIGH (ref 70–99)
GLUCOSE SERPL-MCNC: 211 MG/DL — HIGH (ref 70–99)
HCT VFR BLD CALC: 39 % — SIGNIFICANT CHANGE UP (ref 34.5–45)
HGB BLD-MCNC: 12 G/DL — SIGNIFICANT CHANGE UP (ref 11.5–15.5)
MCHC RBC-ENTMCNC: 28.8 PG — SIGNIFICANT CHANGE UP (ref 27–34)
MCHC RBC-ENTMCNC: 30.8 GM/DL — LOW (ref 32–36)
MCV RBC AUTO: 93.5 FL — SIGNIFICANT CHANGE UP (ref 80–100)
NRBC # BLD: 0 /100 WBCS — SIGNIFICANT CHANGE UP (ref 0–0)
PLATELET # BLD AUTO: 265 K/UL — SIGNIFICANT CHANGE UP (ref 150–400)
POTASSIUM SERPL-MCNC: 4.7 MMOL/L — SIGNIFICANT CHANGE UP (ref 3.5–5.3)
POTASSIUM SERPL-SCNC: 4.7 MMOL/L — SIGNIFICANT CHANGE UP (ref 3.5–5.3)
PROT SERPL-MCNC: 6.5 G/DL — SIGNIFICANT CHANGE UP (ref 6–8.3)
RBC # BLD: 4.17 M/UL — SIGNIFICANT CHANGE UP (ref 3.8–5.2)
RBC # FLD: 14.4 % — SIGNIFICANT CHANGE UP (ref 10.3–14.5)
SODIUM SERPL-SCNC: 139 MMOL/L — SIGNIFICANT CHANGE UP (ref 135–145)
WBC # BLD: 9.25 K/UL — SIGNIFICANT CHANGE UP (ref 3.8–10.5)
WBC # FLD AUTO: 9.25 K/UL — SIGNIFICANT CHANGE UP (ref 3.8–10.5)

## 2020-07-23 PROCEDURE — 99232 SBSQ HOSP IP/OBS MODERATE 35: CPT

## 2020-07-23 PROCEDURE — 99239 HOSP IP/OBS DSCHRG MGMT >30: CPT

## 2020-07-23 RX ORDER — OXYCODONE HYDROCHLORIDE 5 MG/1
1 TABLET ORAL
Qty: 10 | Refills: 0
Start: 2020-07-23 | End: 2020-07-27

## 2020-07-23 RX ORDER — COLLAGENASE CLOSTRIDIUM HIST. 250 UNIT/G
1 OINTMENT (GRAM) TOPICAL
Qty: 2 | Refills: 0
Start: 2020-07-23 | End: 2020-08-21

## 2020-07-23 RX ORDER — INSULIN DETEMIR 100/ML (3)
20 INSULIN PEN (ML) SUBCUTANEOUS
Qty: 4 | Refills: 0
Start: 2020-07-23 | End: 2020-08-21

## 2020-07-23 RX ORDER — BUDESONIDE AND FORMOTEROL FUMARATE DIHYDRATE 160; 4.5 UG/1; UG/1
2 AEROSOL RESPIRATORY (INHALATION)
Qty: 1 | Refills: 0
Start: 2020-07-23 | End: 2020-08-21

## 2020-07-23 RX ORDER — ALBUTEROL 90 UG/1
2 AEROSOL, METERED ORAL
Qty: 1 | Refills: 0
Start: 2020-07-23 | End: 2020-08-21

## 2020-07-23 RX ORDER — HUMAN INSULIN 100 [IU]/ML
20 INJECTION, SUSPENSION SUBCUTANEOUS
Qty: 0 | Refills: 0 | DISCHARGE

## 2020-07-23 RX ORDER — HUMAN INSULIN 100 [IU]/ML
70 INJECTION, SUSPENSION SUBCUTANEOUS
Qty: 0 | Refills: 0 | DISCHARGE

## 2020-07-23 RX ORDER — INSULIN GLARGINE 100 [IU]/ML
20 INJECTION, SOLUTION SUBCUTANEOUS
Qty: 2 | Refills: 0
Start: 2020-07-23 | End: 2020-08-21

## 2020-07-23 RX ORDER — HUMAN INSULIN 100 [IU]/ML
76 INJECTION, SUSPENSION SUBCUTANEOUS
Qty: 0 | Refills: 0 | DISCHARGE

## 2020-07-23 RX ORDER — CLONAZEPAM 1 MG
1 TABLET ORAL
Qty: 5 | Refills: 0
Start: 2020-07-23 | End: 2020-07-27

## 2020-07-23 RX ORDER — OXYCODONE HYDROCHLORIDE 5 MG/1
1 TABLET ORAL
Qty: 0 | Refills: 0 | DISCHARGE

## 2020-07-23 RX ADMIN — POLYETHYLENE GLYCOL 3350 17 GRAM(S): 17 POWDER, FOR SOLUTION ORAL at 12:45

## 2020-07-23 RX ADMIN — BUDESONIDE AND FORMOTEROL FUMARATE DIHYDRATE 2 PUFF(S): 160; 4.5 AEROSOL RESPIRATORY (INHALATION) at 05:32

## 2020-07-23 RX ADMIN — Medication 5 UNIT(S): at 13:04

## 2020-07-23 RX ADMIN — OXYCODONE HYDROCHLORIDE 80 MILLIGRAM(S): 5 TABLET ORAL at 06:24

## 2020-07-23 RX ADMIN — Medication 5 UNIT(S): at 08:37

## 2020-07-23 RX ADMIN — OXYCODONE HYDROCHLORIDE 80 MILLIGRAM(S): 5 TABLET ORAL at 05:31

## 2020-07-23 RX ADMIN — Medication 2: at 08:37

## 2020-07-23 RX ADMIN — Medication 81 MILLIGRAM(S): at 12:43

## 2020-07-23 RX ADMIN — Medication 1000 UNIT(S): at 12:43

## 2020-07-23 RX ADMIN — PIPERACILLIN AND TAZOBACTAM 25 GRAM(S): 4; .5 INJECTION, POWDER, LYOPHILIZED, FOR SOLUTION INTRAVENOUS at 05:31

## 2020-07-23 RX ADMIN — HEPARIN SODIUM 5000 UNIT(S): 5000 INJECTION INTRAVENOUS; SUBCUTANEOUS at 05:31

## 2020-07-23 RX ADMIN — OXYCODONE HYDROCHLORIDE 80 MILLIGRAM(S): 5 TABLET ORAL at 16:13

## 2020-07-23 NOTE — PROGRESS NOTE ADULT - PROBLEM SELECTOR PLAN 4
-Will c/w home regimen of oxycontin 80mg q12h  - also on Vicodin at home 7.5mg/325mg tid prn for breakthrough pain  - Istop #[id number]
-Will c/w home regimen of oxycontin 80mg q12h  - also on Vicodin at home 7.5mg/325mg tid prn for breakthrough pain  - Istop #[id number]
-Will c/w home regimen of oxycontin 80mg q12h  - also on Vicodin at home 7.5mg/325mg tid  - Istop #[id number]

## 2020-07-23 NOTE — PROGRESS NOTE ADULT - SUBJECTIVE AND OBJECTIVE BOX
Contact info:   Corby Pyle MD  pager [phone], please provide 10 digit call back number.   Please note that this patient may be followed by another provider tomorrow.   If no answer or after hours, please contact [phone]    Subjective: patient is getting ready to be discharged home.  Feels comfortable with insulin injection plans.  She reports she does not have an outpatient endocrinologist.  She reports no nausea/vomiting.  No hypoglycemia, mild hyperglycemia with gluocose in 200s range.      MEDICATIONS  (STANDING):  aspirin enteric coated 81 milliGRAM(s) Oral daily  atorvastatin 80 milliGRAM(s) Oral at bedtime  budesonide 160 MICROgram(s)/formoterol 4.5 MICROgram(s) Inhaler 2 Puff(s) Inhalation two times a day  cholecalciferol 1000 Unit(s) Oral daily  collagenase Ointment 1 Application(s) Topical daily  dextrose 5%. 1000 milliLiter(s) (50 mL/Hr) IV Continuous <Continuous>  dextrose 50% Injectable 12.5 Gram(s) IV Push once  dextrose 50% Injectable 25 Gram(s) IV Push once  dextrose 50% Injectable 25 Gram(s) IV Push once  heparin   Injectable 5000 Unit(s) SubCutaneous every 8 hours  insulin glargine Injectable (LANTUS) 20 Unit(s) SubCutaneous at bedtime  insulin lispro (HumaLOG) corrective regimen sliding scale   SubCutaneous three times a day before meals  insulin lispro (HumaLOG) corrective regimen sliding scale   SubCutaneous at bedtime  insulin lispro Injectable (HumaLOG) 5 Unit(s) SubCutaneous three times a day before meals  oxyCODONE  ER Tablet 80 milliGRAM(s) Oral every 12 hours  piperacillin/tazobactam IVPB.. 3.375 Gram(s) IV Intermittent every 6 hours  polyethylene glycol 3350 17 Gram(s) Oral daily  vancomycin  IVPB 1500 milliGRAM(s) IV Intermittent every 24 hours    MEDICATIONS  (PRN):  ALBUTerol    90 MICROgram(s) HFA Inhaler 2 Puff(s) Inhalation every 6 hours PRN Shortness of Breath and/or Wheezing  clonazePAM  Tablet 0.5 milliGRAM(s) Oral daily PRN Anxiety  dextrose 40% Gel 15 Gram(s) Oral once PRN Blood Glucose LESS THAN 70 milliGRAM(s)/deciliter  glucagon  Injectable 1 milliGRAM(s) IntraMuscular once PRN Glucose LESS THAN 70 milligrams/deciliter      Allergies    adhesives (Rash)  latex (Rash)  No Known Drug Allergies  wool- rash, itch (Other)    Intolerances      Review of Systems:  Constitutional: + fever, decreased appetite  Eyes: No blurry vision  Neuro: No tremors  HEENT: No pain  Cardiovascular: No chest pain, palpitations  Respiratory: No SOB, no cough  GI: No nausea, vomiting, abdominal pain  : No dysuria  Skin: + rash  Psych: no depression  Endocrine: no polyuria, polydipsia  Hem/lymph: + LE  swelling  Osteoporosis: +foot pain  Neuro: +neuropathy    ALL OTHER SYSTEMS REVIEWED AND NEGATIVE    PHYSICAL EXAM:  VITALS: T(C): 36.8 (07-23-20 @ 12:53)  T(F): 98.2 (07-23-20 @ 12:53), Max: 98.2 (07-23-20 @ 12:53)  HR: 73 (07-23-20 @ 12:53) (65 - 73)  BP: 152/71 (07-23-20 @ 12:53) (117/69 - 152/71)  RR:  (18 - 20)  SpO2:  (95% - 98%)  Wt(kg): --  GENERAL: NAD, well-groomed, obese  EYES: No proptosis, anicteric  HEENT:  Atraumatic, Normocephalic, moist mucous membranes  THYROID: Normal size, no palpable nodules  RESPIRATORY: Clear to auscultation bilaterally  CARDIOVASCULAR: Regular rate and rhythm; + LE peripheral edema  GI: Soft, nontender, non distended, normal bowel sounds  SKIN: Dry, intact, +VENOSTASIS CHANGES/ ERYTHEMA/ULCERATIONS ON LE and feet b/l  MUSCULOSKELETAL: +GAUGE/BANDAGE r FOOT  NEURO: no tremor  PSYCH: Alert and oriented x 3, normal affect, normal mood    POCT Blood Glucose.: 208 mg/dL (07-23-20 @ 11:54)  POCT Blood Glucose.: 180 mg/dL (07-23-20 @ 08:23)  POCT Blood Glucose.: 232 mg/dL (07-22-20 @ 21:51)  POCT Blood Glucose.: 223 mg/dL (07-22-20 @ 17:15)  POCT Blood Glucose.: 160 mg/dL (07-22-20 @ 12:15)  POCT Blood Glucose.: 157 mg/dL (07-22-20 @ 08:29)  POCT Blood Glucose.: 287 mg/dL (07-21-20 @ 22:04)  POCT Blood Glucose.: 242 mg/dL (07-21-20 @ 19:56)  POCT Blood Glucose.: 165 mg/dL (07-21-20 @ 17:06)  POCT Blood Glucose.: 128 mg/dL (07-21-20 @ 13:20)  POCT Blood Glucose.: 123 mg/dL (07-21-20 @ 09:19)  POCT Blood Glucose.: 85 mg/dL (07-21-20 @ 08:47)  POCT Blood Glucose.: 67 mg/dL (07-21-20 @ 08:23)  POCT Blood Glucose.: 101 mg/dL (07-20-20 @ 21:44)  POCT Blood Glucose.: 118 mg/dL (07-20-20 @ 17:26)  POCT Blood Glucose.: 115 mg/dL (07-20-20 @ 15:58)    A1C with Estimated Average Glucose Result: 7.7 % (07-21-20 @ 09:22)    07-23    139  |  100  |  30<H>  ----------------------------<  211<H>  4.7   |  27  |  1.81<H>    EGFR if : 33<L>  EGFR if non : 29<L>    Ca    9.5      07-23  Mg     2.2     07-21  Phos  4.1     07-21    TPro  6.5  /  Alb  3.5  /  TBili  0.6  /  DBili  x   /  AST  14  /  ALT  25  /  AlkPhos  172<H>  07-23        Thyroid Function Tests:

## 2020-07-23 NOTE — PROGRESS NOTE ADULT - PROBLEM SELECTOR PLAN 1
-on  Vanc/zosyn x 2 days ; will switch to Augmentin to complete 1 week abx course  - Bcx NGTD,  Ucx < [id number], wound cx : rare stap aureus  - No MRI needed as patient has active Charcot of R foot which would result in false positive  - per Podiatry pt is stable for d/c on PO abx ; needs daily dressing with Aquacel and Santyl  - will d/c with home care service

## 2020-07-23 NOTE — PROGRESS NOTE ADULT - PROBLEM SELECTOR PROBLEM 2
Hyperlipidemia, unspecified hyperlipidemia type
Diabetic foot infection

## 2020-07-23 NOTE — DISCHARGE NOTE PROVIDER - NSDCCPCAREPLAN_GEN_ALL_CORE_FT
PRINCIPAL DISCHARGE DIAGNOSIS  Diagnosis: Foot infection  Assessment and Plan of Treatment: Continue Antibiotic ( Augmentin) as prescribed.  Apply Collagenase 250 Unit(s)/Gram Ointment, 1 Application(s)  Follow up with your PMD in 1 week.      SECONDARY DISCHARGE DIAGNOSES  Diagnosis: Sepsis, due to unspecified organism, unspecified whether acute organ dysfunction present  Assessment and Plan of Treatment: Take all antibiotics as ordered.  Call you Health care provider upon arrival home to make a one week follow up appointment.  If you develop fever, chills, malaise, or change in mental status call your Health Care Provider or go to the Emergency Department.  Nutrition is important, eat small frequent meals to help ensure you get adequate calories.  Do not stay in bed all day!  Increase your activity daily as tolerated.      Diagnosis: Type 2 diabetes mellitus with diabetic neuropathy, with long-term current use of insulin  Assessment and Plan of Treatment: Type 2 diabetes mellitus with diabetic neuropathy, with long-term current use of insulin    Diagnosis: Chronic kidney disease, unspecified CKD stage  Assessment and Plan of Treatment: Follow up with [facility] MD in 1 week.    Diagnosis: Mild intermittent asthma without complication  Assessment and Plan of Treatment:   Take medicines as directed by your caregiver.  Control your home environment in the following ways to help prevent asthma attacks:  Get rid of pests (such as roaches and mice) and their droppings.      Diagnosis: Hypertension, unspecified type  Assessment and Plan of Treatment: Take your medication as prescribed.  Follow up with your medical doctor for routine blood pressure monitoring, and to establish long term blood pressure treatment goals.  Low salt diet  Activity as tolerated.  Notify your doctor if you have any of the following symptoms:   Dizziness, Lightheadedness, Blurry vision, Headache, Chest pain, Shortness of breath      Diagnosis: Hyperlipidemia, unspecified hyperlipidemia type  Assessment and Plan of Treatment: Take your medication as prescribed.   Follow up with your medical doctor for routine blood  work monitoring, and to establish long term treatment goals.

## 2020-07-23 NOTE — DISCHARGE NOTE PROVIDER - HOSPITAL COURSE
65F PMH morbid obesity w/likely OHS (suspected by outpatient pulm, but pending sleep study), DM2 on insulin (A1c 11/2019 7.0) c/b neuropathy and charcot foot, CKD (Cr 1.64 Nov 2019), chronic venous stasis, spinal stenosis and cervical radiculopathy s/p ?partial repair 2002, homebound, chronic back pain on opioids who presented  with sepsis 2/2 R foot wound and cellulitis. Treated with  broad spectrum abx, cannot r/o OM.     Sepsis, due to unspecified organism, unspecified whether acute organ dysfunction present: -C/w broad spectrum: Vanc/zosyn    Trend BMP given CKD w/possible JACQUES.    Bcx NGTD,  Ucx < [id number], wound cx : rare stap aureus    -ESR elevated    -No MRI needed as patient has active Charcot of R foot which would result in false positive    - per Podiatry pt is stable for d/c on PO abx.    Diabetic foot infection: -Treatment of infection as above    -F/u podiatry recs.     Type 2 diabetes mellitus with diabetic neuropathy, with long-term current use of insulin: Home regimen w/NPH 76u/70u AM/PM + 5u premeal    -p t was hypoglycemic on admission    - c/w basal insulin lantus 20u qhs    -C/w 5u premeal, low SSI    - HbA1C 7.7    - Endocrine consult reccs appreciated    -Diabetic diet.     Other chronic pain: -Will c/w home regimen of oxycontin 80mg q12h    - also on Vicodin at home 7.5mg/325mg tid prn for breakthrough pain    - Istop #[id number].     Chronic kidney disease, unspecified CKD stage: - pt with JACQUES on CKD    - Cr on admission 2.4 ; baseline ~ 1.6    - down to 1.9 today    - Lasix on hold for now    - Renally dose meds.     PAD (peripheral artery disease): -VINOD/PVR shows no right lower extremity arterial vascular disease.    -Duplex US negative  DVT.    Mild intermittent asthma without complication: Reports wheezing; denies sob    - uses ventolin, advair inhalers and albuterol nebs prn    - will c/w inhalers    - no indication for duoneb at this time    - wean off O2.     Insomnia, unspecified type: -C/w home Clonazepam PRN.     Prophylactic measure: -DVT: Heparin SC     -PT/OT eval.     Discharge planning issues: SW consult for complex home issues.     Pt medically stable for discharge home per Med Attending Dr Leal.

## 2020-07-23 NOTE — DISCHARGE NOTE NURSING/CASE MANAGEMENT/SOCIAL WORK - PATIENT PORTAL LINK FT
You can access the FollowMyHealth Patient Portal offered by [facility] by registering at the following website: [email or web address]. By joining [facility]’s FollowMyHealth portal, you will also be able to view your health information using other applications (apps) compatible with our system.

## 2020-07-23 NOTE — PROGRESS NOTE ADULT - PROBLEM SELECTOR PLAN 6
-VINOD/PVR shows no right lower extremity arterial vascular disease.  -Duplex US negative  DVT

## 2020-07-23 NOTE — PROGRESS NOTE ADULT - PROBLEM SELECTOR PLAN 2
-Treatment of infection as above  -F/u podiatry recs

## 2020-07-23 NOTE — PROGRESS NOTE ADULT - PROBLEM SELECTOR PLAN 5
- pt with JACQUES on CKD  - Cr on admission 2.4 ; baseline ~ 1.6  - down to 1.8 today  - restart home Lasix on discharge  - Renally dose meds
- pt with JACQUES on CKD  - Cr on admission 2.4 ; baseline ~ 1.6  - down to 1.9 today  - Lasix on hold for now  - Renally dose meds
-Renally dose meds

## 2020-07-23 NOTE — PROGRESS NOTE ADULT - PROBLEM SELECTOR PROBLEM 1
Type 2 diabetes mellitus with diabetic neuropathy, with long-term current use of insulin
Sepsis, due to unspecified organism, unspecified whether acute organ dysfunction present

## 2020-07-23 NOTE — PROGRESS NOTE ADULT - PROBLEM SELECTOR PLAN 10
-SW consult for complex home issues    Transitions of Care Status:  1.  Name of PCP: Paula Mccann  2.  PCP Contacted on Admission: [ ] Y    [x] N    3.  PCP contacted at Discharge: [ ] Y    [ ] N    [ ] N/A  4.  Post-Discharge Appointment Date and Location:  5.  Summary of Handoff given to PCP:
Transitions of Care Status:  1.  Name of PCP: Paula Mccann  2.  PCP Contacted on Admission: [ ] Y    [x] N    3.  PCP contacted at Discharge: [ ] Y    [ ] N    [ ] N/A  4.  Post-Discharge Appointment Date and Location:  5.  Summary of Handoff given to PCP:
-SW consult for complex home issues    Transitions of Care Status:  1.  Name of PCP: Paula Mccann  2.  PCP Contacted on Admission: [ ] Y    [x] N    3.  PCP contacted at Discharge: [ ] Y    [ ] N    [ ] N/A  4.  Post-Discharge Appointment Date and Location:  5.  Summary of Handoff given to PCP:

## 2020-07-23 NOTE — DISCHARGE NOTE PROVIDER - NSDCMRMEDTOKEN_GEN_ALL_CORE_FT
aspirin 81 mg oral tablet: 1 tab(s) orally once a day, prophylaxis  Bariatric  Three In One Commode:   Bariatric Walker:   clonazePAM: 0.5 milligram(s) orally once (at bedtime), As Needed  Lasix 40 mg oral tablet: 1 tab(s) orally once a day  MiraLax oral powder for reconstitution: 17 gram(s) orally once a day  NovoLIN N 100 units/mL subcutaneous suspension: 76 unit(s) subcutaneous once a day (in the morning)  NovoLIN N 100 units/mL subcutaneous suspension: 70 unit(s) subcutaneous once a day (at bedtime)  NovoLOG 100 units/mL subcutaneous solution: 5 unit(s) subcutaneous 3 times a day (before meals)  OxyCONTIN 80 mg oral tablet, extended release: 1 tab(s) orally every 12 hours, As Needed  rosuvastatin 20 mg oral tablet: 1 tab(s) orally once a day  Vicodin ES 7.5 mg-300 mg oral tablet: 1 tab(s) orally 3 times a day, As Needed  Vitamin D3 1000 intl units oral capsule: 1 cap(s) orally once a day albuterol 90 mcg/inh inhalation aerosol: 2 puff(s) inhaled every 6 hours, As needed, Shortness of Breath and/or Wheezing  aspirin 81 mg oral tablet: 1 tab(s) orally once a day, prophylaxis  Augmentin 875 mg-125 mg oral tablet: 1 tab(s) orally every 12 hours   Bariatric  Three In One Commode:   Bariatric Walker:   budesonide-formoterol 160 mcg-4.5 mcg/inh inhalation aerosol: 2 puff(s) inhaled 2 times a day   collagenase 250 units/g topical ointment: 1 application topically once a day to Right foot.   Interdry Moisture Wicking Fabric Antimicrobial Silver: 4 strip(s) transdermally every 5 days   KlonoPIN 0.5 mg oral tablet: 1 tab(s) orally once a day, As Needed for anxiety MDD:1  Lasix 40 mg oral tablet: 1 tab(s) orally once a day  MiraLax oral powder for reconstitution: 17 gram(s) orally once a day  NovoLIN N 100 units/mL subcutaneous suspension: 20 unit(s) subcutaneous once a day (at bedtime)  NovoLOG 100 units/mL subcutaneous solution: 5 unit(s) subcutaneous 3 times a day (before meals)  oxyCODONE 80 mg oral tablet, extended release: 1 tab(s) orally every 12 hours MDD:2  rosuvastatin 20 mg oral tablet: 1 tab(s) orally once a day  Vicodin ES 7.5 mg-300 mg oral tablet: 1 tab(s) orally 3 times a day, As Needed for break trough pain. MDD:3  Vitamin D3 1000 intl units oral capsule: 1 cap(s) orally once a day

## 2020-07-23 NOTE — DISCHARGE NOTE PROVIDER - NSFOLLOWUPCLINICS_GEN_ALL_ED_FT
[facility]  Endocrinology  [address] [place] [address]  Phone: [phone]  Fax:   Follow Up Time: 1 week

## 2020-07-23 NOTE — PROGRESS NOTE ADULT - PROBLEM SELECTOR PLAN 3
Home regimen w/NPH 76u/70u AM/PM + 5u premeal  -p t was hypoglycemic on admission  - c/w basal insulin lantus 20u qhs  -C/w 5u premeal, low SSI  - HbA1C 7.7  - Endocrine consult reccs appreciated  -Diabetic diet
Home regimen w/NPH 76u/70u AM/PM + 5u premeal  -pt was hypoglycemic on admission  - will d/c home on current inpatient regimen ; basal insulin lantus 20u qhs and 5u premeal  - HbA1C 7.7  - Endocrine reccs appreciated  -Diabetic diet
Home regimen w/NPH 76u/70u AM/PM + 5u premeal  -Given hypoglycemic on admission, will lower basal insulin to lantus 10u tonight  -C/w 5u premeal, low SSI  - HbA1C 7.7  - Obtain Endocrine consult   -Diabetic diet

## 2020-07-23 NOTE — PROGRESS NOTE ADULT - PROBLEM SELECTOR PROBLEM 5
Chronic kidney disease, unspecified CKD stage

## 2020-07-23 NOTE — DISCHARGE NOTE PROVIDER - NSDCFUADDINST_GEN_ALL_CORE_FT
Podiatry Discharge Instructions:  Follow up: Please follow up with Dr. Riley within 1 week of discharge from the hospital, please call [phone] for appointment and discuss that you recently were seen in the hospital.  Wound Care: Please apply santyl to wound & dress with 4x4 gauze, ABD pad, & yasir dressing.   Weight bearing: Please weight bear as tolerated in a surgical shoe.  Antibiotics: Please continue as instructed.

## 2020-07-23 NOTE — DISCHARGE NOTE PROVIDER - CARE PROVIDER_API CALL
Paula Mccann  INTERNAL MEDICINE  [address] [place] [address]  Phone: [phone]  Fax: [phone]  Established Patient  Follow Up Time: 1 week

## 2020-07-23 NOTE — PROGRESS NOTE ADULT - PROBLEM SELECTOR PROBLEM 3
Hypertension, unspecified type
Type 2 diabetes mellitus with diabetic neuropathy, with long-term current use of insulin

## 2020-07-23 NOTE — PROGRESS NOTE ADULT - SUBJECTIVE AND OBJECTIVE BOX
Patient is a 65y old  Female who presents with a chief complaint of Fever (22 Jul 2020 12:49)      SUBJECTIVE / OVERNIGHT EVENTS:    MEDICATIONS  (STANDING):  aspirin enteric coated 81 milliGRAM(s) Oral daily  atorvastatin 80 milliGRAM(s) Oral at bedtime  budesonide 160 MICROgram(s)/formoterol 4.5 MICROgram(s) Inhaler 2 Puff(s) Inhalation two times a day  cholecalciferol 1000 Unit(s) Oral daily  collagenase Ointment 1 Application(s) Topical daily  dextrose 5%. 1000 milliLiter(s) (50 mL/Hr) IV Continuous <Continuous>  dextrose 50% Injectable 12.5 Gram(s) IV Push once  dextrose 50% Injectable 25 Gram(s) IV Push once  dextrose 50% Injectable 25 Gram(s) IV Push once  heparin   Injectable 5000 Unit(s) SubCutaneous every 8 hours  insulin glargine Injectable (LANTUS) 20 Unit(s) SubCutaneous at bedtime  insulin lispro (HumaLOG) corrective regimen sliding scale   SubCutaneous three times a day before meals  insulin lispro (HumaLOG) corrective regimen sliding scale   SubCutaneous at bedtime  insulin lispro Injectable (HumaLOG) 5 Unit(s) SubCutaneous three times a day before meals  oxyCODONE  ER Tablet 80 milliGRAM(s) Oral every 12 hours  piperacillin/tazobactam IVPB.. 3.375 Gram(s) IV Intermittent every 6 hours  polyethylene glycol 3350 17 Gram(s) Oral daily  vancomycin  IVPB 1500 milliGRAM(s) IV Intermittent every 24 hours    MEDICATIONS  (PRN):  ALBUTerol    90 MICROgram(s) HFA Inhaler 2 Puff(s) Inhalation every 6 hours PRN Shortness of Breath and/or Wheezing  clonazePAM  Tablet 0.5 milliGRAM(s) Oral daily PRN Anxiety  dextrose 40% Gel 15 Gram(s) Oral once PRN Blood Glucose LESS THAN 70 milliGRAM(s)/deciliter  glucagon  Injectable 1 milliGRAM(s) IntraMuscular once PRN Glucose LESS THAN 70 milligrams/deciliter      Vital Signs Last 24 Hrs  T(C): 36.6 (23 Jul 2020 04:17), Max: 36.6 (23 Jul 2020 04:17)  T(F): 97.9 (23 Jul 2020 04:17), Max: 97.9 (23 Jul 2020 04:17)  HR: 65 (23 Jul 2020 04:17) (65 - 73)  BP: 117/69 (23 Jul 2020 04:17) (117/69 - 125/54)  BP(mean): --  RR: 20 (23 Jul 2020 04:17) (20 - 20)  SpO2: 97% (23 Jul 2020 04:17) (92% - 97%)  CAPILLARY BLOOD GLUCOSE      POCT Blood Glucose.: 180 mg/dL (23 Jul 2020 08:23)  POCT Blood Glucose.: 232 mg/dL (22 Jul 2020 21:51)  POCT Blood Glucose.: 223 mg/dL (22 Jul 2020 17:15)  POCT Blood Glucose.: 160 mg/dL (22 Jul 2020 12:15)    I&O's Summary    22 Jul 2020 07:01  -  23 Jul 2020 07:00  --------------------------------------------------------  IN: 1700 mL / OUT: 800 mL / NET: 900 mL        PHYSICAL EXAM:  GENERAL: NAD, well-developed  HEAD:  Atraumatic, Normocephalic  EYES: EOMI, PERRLA, conjunctiva and sclera clear  NECK: Supple, No JVD  CHEST/LUNG: Clear to auscultation bilaterally; No wheeze  HEART: Regular rate and rhythm; No murmurs, rubs, or gallops  ABDOMEN: Soft, Nontender, Nondistended; Bowel sounds present  EXTREMITIES:  2+ Peripheral Pulses, No clubbing, cyanosis, or edema  PSYCH: AAOx3  NEUROLOGY: non-focal  SKIN: No rashes or lesions    LABS:                        12.0   9.25  )-----------( 265      ( 23 Jul 2020 06:55 )             39.0     07-23    139  |  100  |  30<H>  ----------------------------<  211<H>  4.7   |  27  |  1.81<H>    Ca    9.5      23 Jul 2020 06:55    TPro  6.5  /  Alb  3.5  /  TBili  0.6  /  DBili  x   /  AST  14  /  ALT  25  /  AlkPhos  172<H>  07-23              RADIOLOGY & ADDITIONAL TESTS:    Imaging Personally Reviewed:    Consultant(s) Notes Reviewed:      Care Discussed with Consultants/Other Providers: Patient is a 65y old  Female who presents with a chief complaint of Fever (22 Jul 2020 12:49)      SUBJECTIVE / OVERNIGHT EVENTS: Pt seen and examined. No acute events overnight. She denies fever, chills, RLE pain.    MEDICATIONS  (STANDING):  aspirin enteric coated 81 milliGRAM(s) Oral daily  atorvastatin 80 milliGRAM(s) Oral at bedtime  budesonide 160 MICROgram(s)/formoterol 4.5 MICROgram(s) Inhaler 2 Puff(s) Inhalation two times a day  cholecalciferol 1000 Unit(s) Oral daily  collagenase Ointment 1 Application(s) Topical daily  dextrose 5%. 1000 milliLiter(s) (50 mL/Hr) IV Continuous <Continuous>  dextrose 50% Injectable 12.5 Gram(s) IV Push once  dextrose 50% Injectable 25 Gram(s) IV Push once  dextrose 50% Injectable 25 Gram(s) IV Push once  heparin   Injectable 5000 Unit(s) SubCutaneous every 8 hours  insulin glargine Injectable (LANTUS) 20 Unit(s) SubCutaneous at bedtime  insulin lispro (HumaLOG) corrective regimen sliding scale   SubCutaneous three times a day before meals  insulin lispro (HumaLOG) corrective regimen sliding scale   SubCutaneous at bedtime  insulin lispro Injectable (HumaLOG) 5 Unit(s) SubCutaneous three times a day before meals  oxyCODONE  ER Tablet 80 milliGRAM(s) Oral every 12 hours  piperacillin/tazobactam IVPB.. 3.375 Gram(s) IV Intermittent every 6 hours  polyethylene glycol 3350 17 Gram(s) Oral daily  vancomycin  IVPB 1500 milliGRAM(s) IV Intermittent every 24 hours    MEDICATIONS  (PRN):  ALBUTerol    90 MICROgram(s) HFA Inhaler 2 Puff(s) Inhalation every 6 hours PRN Shortness of Breath and/or Wheezing  clonazePAM  Tablet 0.5 milliGRAM(s) Oral daily PRN Anxiety  dextrose 40% Gel 15 Gram(s) Oral once PRN Blood Glucose LESS THAN 70 milliGRAM(s)/deciliter  glucagon  Injectable 1 milliGRAM(s) IntraMuscular once PRN Glucose LESS THAN 70 milligrams/deciliter      Vital Signs Last 24 Hrs  T(C): 36.6 (23 Jul 2020 04:17), Max: 36.6 (23 Jul 2020 04:17)  T(F): 97.9 (23 Jul 2020 04:17), Max: 97.9 (23 Jul 2020 04:17)  HR: 65 (23 Jul 2020 04:17) (65 - 73)  BP: 117/69 (23 Jul 2020 04:17) (117/69 - 125/54)  BP(mean): --  RR: 20 (23 Jul 2020 04:17) (20 - 20)  SpO2: 97% (23 Jul 2020 04:17) (92% - 97%)  CAPILLARY BLOOD GLUCOSE      POCT Blood Glucose.: 180 mg/dL (23 Jul 2020 08:23)  POCT Blood Glucose.: 232 mg/dL (22 Jul 2020 21:51)  POCT Blood Glucose.: 223 mg/dL (22 Jul 2020 17:15)  POCT Blood Glucose.: 160 mg/dL (22 Jul 2020 12:15)    I&O's Summary    22 Jul 2020 07:01  -  23 Jul 2020 07:00  --------------------------------------------------------  IN: 1700 mL / OUT: 800 mL / NET: 900 mL        PHYSICAL EXAM:  GENERAL: NAD,obese, anicteric, afebrile  HEAD:  Atraumatic, Normocephalic  EYES: EOMI, PERRLA, conjunctiva and sclera clear  NECK: Supple, No JVD  CHEST/LUNG: Clear to auscultation bilaterally; mild wheeze  HEART: Regular rate and rhythm; No murmurs, rubs, or gallops  ABDOMEN: Soft, Nontender, Nondistended; Bowel sounds present  EXTREMITIES:  3+ pitting LE edema, RLE dressing cdi, chronic skin changes on b/l shins  PSYCH: calm, appropriate affect  NEUROLOGY: AAOx3  SKIN: No rashes or lesions    LABS:                        12.0   9.25  )-----------( 265      ( 23 Jul 2020 06:55 )             39.0     07-23    139  |  100  |  30<H>  ----------------------------<  211<H>  4.7   |  27  |  1.81<H>    Ca    9.5      23 Jul 2020 06:55    TPro  6.5  /  Alb  3.5  /  TBili  0.6  /  DBili  x   /  AST  14  /  ALT  25  /  AlkPhos  172<H>  07-23                Consultant(s) Notes Reviewed:  Podiatry, endocrinology

## 2020-07-23 NOTE — PROGRESS NOTE ADULT - ASSESSMENT
65 y.o. woman w DM2,  (A1c 11/2019 7.0) c/b neuropathy and charcot foot, morbid obesity w/likely OHS, CKD (Cr 1.64 Nov 2019), chronic venous stasis, spinal stenosis and cervical radiculopathy s/p ?partial repair 2002, homebound, endometrial CA 2006 s/p MEGAN/BSO, chronic back pain on opioids who presents with 5 days of fever and R foot pain. Endo consulted for DM management.  Patient is doing well, is getting discharged home today.      #1: Uncontrolled type 2 diabetes mellitus with hypoglycemia without coma. Recommendation: A1C 7, w/ hypoglycemia during hospitalization and reported hypoglycemia at home.  FS's currently ok, w/o hypoglycemia, although elevated bedtime value yesterday to 200's. Will cont. current regimen for now:  -Lantus 20u qhs  -Humalog 5-5-5  -Mod dose SS TIDAC/qhd  -Carb consistent diet  -Monitor FS's TIDAC/qhs    D/C: Can f/u at [facility] faculty practice at [address] [place] [address]. Please call [phone] for appt.   Recommend to discharge on Lantus 20 units, Humalog 5 units tid with meals.      #2 Hyperlipidemia, unspecified hyperlipidemia type.  Recommendation: Cont. statin.     #3 Hypertension, unspecified type.  Recommendation: BP generally w/in goal <130/80 off meds. Cont. to monitor.
Pt is 64 yo presenting w/ infected right foot wound and cellulitis  - Seen and examined  - Afebrile, WBC trending down from 14.65 to 10.88, CRP trending down to 4.74,   - s/p de-roofed blister with granular wound bed & healthy sanguinous drainage, improved erythema with no necrotic changes, healthy fibrogranular wound bed  - Culture pending  - Rx santyl  - No MRI as patient has active Charcot of R foot which would result in false positive  - Given improved clinical appearance of foot will continue LWC  - cont. IV ABx  - VINOD results reviewed, likely small vessel disease to L digits & R forefoot  - discussed w/ attending
Pt is 64 yo presenting w/ resolving right foot cellulitis; Charcot right foot; Ulceration, right foot    - Cont santyl daily - will need home wound care on DC please  - No MRI needed as patient has active Charcot of R foot which would result in false positive  - Given improved clinical appearance of foot will continue LWC   - cont. IV ABx while in house  - VINOD results reviewed, likely small vessel disease to L digits & R forefoot  - Podiatrically stable for DC on PO abx.
Pt is 66 yo presenting w/ resolving right foot cellulitis; Charcot right foot; Ulceration, right foot  - Seen and examined  - Afebrile, WBC trending down   - s/p de-roofed blister with granular wound bed & healthy sanguinous drainage, improved erythema with no necrotic changes, healthy fibrogranular wound bed  - Cont santyl daily - will need home wound care on DC please  - No MRI needed as patient has active Charcot of R foot which would result in false positive  - Given improved clinical appearance of foot will continue LWC   - cont. IV ABx while in house  - VINOD results reviewed, likely small vessel disease to L digits & R forefoot\  - Podiatrically stable for DC on PO abx.
65F PMH morbid obesity w/likely OHS (suspected by outpatient pulm, but pending sleep study), DM2 on insulin (A1c 11/2019 7.0) c/b neuropathy and charcot foot, CKD (Cr 1.64 Nov 2019), chronic venous stasis, spinal stenosis and cervical radiculopathy s/p ?partial repair 2002, homebound, chronic back pain on opioids who presents with sepsis 2/2 R foot wound and cellulitis, now on broad spectrum abx, cannot r/o OM.

## 2020-07-23 NOTE — PROGRESS NOTE ADULT - PROBLEM SELECTOR PLAN 7
- reports wheezing; denies sob  - uses ventolin, advair inhalers and albuterol nebs prn  - will c/w inhalers  - no indication for duoneb at this time  - sating well on room air
- reports wheezing; denies sob  - uses ventolin, advair inhalers and albuterol nebs prn  - will c/w inhalers  - no indication for duoneb at this time  - wean off O2
- reports wheezing; denies sob  - uses ventolin, advair inhalers and albuterol nebs prn  - will c/w home meds

## 2020-07-24 PROCEDURE — 87635 SARS-COV-2 COVID-19 AMP PRB: CPT

## 2020-07-24 PROCEDURE — 93923 UPR/LXTR ART STDY 3+ LVLS: CPT

## 2020-07-24 PROCEDURE — 87040 BLOOD CULTURE FOR BACTERIA: CPT

## 2020-07-24 PROCEDURE — 86803 HEPATITIS C AB TEST: CPT

## 2020-07-24 PROCEDURE — 80202 ASSAY OF VANCOMYCIN: CPT

## 2020-07-24 PROCEDURE — 83036 HEMOGLOBIN GLYCOSYLATED A1C: CPT

## 2020-07-24 PROCEDURE — 76937 US GUIDE VASCULAR ACCESS: CPT

## 2020-07-24 PROCEDURE — 97162 PT EVAL MOD COMPLEX 30 MIN: CPT

## 2020-07-24 PROCEDURE — 80053 COMPREHEN METABOLIC PANEL: CPT

## 2020-07-24 PROCEDURE — 71045 X-RAY EXAM CHEST 1 VIEW: CPT

## 2020-07-24 PROCEDURE — 93005 ELECTROCARDIOGRAM TRACING: CPT

## 2020-07-24 PROCEDURE — 84484 ASSAY OF TROPONIN QUANT: CPT

## 2020-07-24 PROCEDURE — 97116 GAIT TRAINING THERAPY: CPT

## 2020-07-24 PROCEDURE — 97530 THERAPEUTIC ACTIVITIES: CPT

## 2020-07-24 PROCEDURE — 84100 ASSAY OF PHOSPHORUS: CPT

## 2020-07-24 PROCEDURE — 82553 CREATINE MB FRACTION: CPT

## 2020-07-24 PROCEDURE — 86140 C-REACTIVE PROTEIN: CPT

## 2020-07-24 PROCEDURE — 87086 URINE CULTURE/COLONY COUNT: CPT

## 2020-07-24 PROCEDURE — 82550 ASSAY OF CK (CPK): CPT

## 2020-07-24 PROCEDURE — 96374 THER/PROPH/DIAG INJ IV PUSH: CPT

## 2020-07-24 PROCEDURE — 82962 GLUCOSE BLOOD TEST: CPT

## 2020-07-24 PROCEDURE — 87070 CULTURE OTHR SPECIMN AEROBIC: CPT

## 2020-07-24 PROCEDURE — 86769 SARS-COV-2 COVID-19 ANTIBODY: CPT

## 2020-07-24 PROCEDURE — 83880 ASSAY OF NATRIURETIC PEPTIDE: CPT

## 2020-07-24 PROCEDURE — 87205 SMEAR GRAM STAIN: CPT

## 2020-07-24 PROCEDURE — 87186 SC STD MICRODIL/AGAR DIL: CPT

## 2020-07-24 PROCEDURE — 99285 EMERGENCY DEPT VISIT HI MDM: CPT | Mod: 25

## 2020-07-24 PROCEDURE — 85652 RBC SED RATE AUTOMATED: CPT

## 2020-07-24 PROCEDURE — 81003 URINALYSIS AUTO W/O SCOPE: CPT

## 2020-07-24 PROCEDURE — 83735 ASSAY OF MAGNESIUM: CPT

## 2020-07-24 PROCEDURE — 73560 X-RAY EXAM OF KNEE 1 OR 2: CPT

## 2020-07-24 PROCEDURE — 94640 AIRWAY INHALATION TREATMENT: CPT

## 2020-07-24 PROCEDURE — 96375 TX/PRO/DX INJ NEW DRUG ADDON: CPT

## 2020-07-24 PROCEDURE — 85027 COMPLETE CBC AUTOMATED: CPT

## 2020-07-24 PROCEDURE — 93970 EXTREMITY STUDY: CPT

## 2020-07-24 PROCEDURE — 73620 X-RAY EXAM OF FOOT: CPT

## 2020-07-25 ENCOUNTER — INPATIENT (INPATIENT)
Facility: HOSPITAL | Age: 65
LOS: 3 days | Discharge: HOME | DRG: 291 | End: 2020-07-29
Attending: FAMILY MEDICINE | Admitting: HOSPITALIST
Payer: COMMERCIAL

## 2020-07-25 VITALS
SYSTOLIC BLOOD PRESSURE: 159 MMHG | TEMPERATURE: 98 F | WEIGHT: 293 LBS | DIASTOLIC BLOOD PRESSURE: 79 MMHG | HEIGHT: 63 IN | RESPIRATION RATE: 20 BRPM | HEART RATE: 91 BPM | OXYGEN SATURATION: 96 %

## 2020-07-25 DIAGNOSIS — Z98.890 OTHER SPECIFIED POSTPROCEDURAL STATES: Chronic | ICD-10-CM

## 2020-07-25 DIAGNOSIS — Z90.710 ACQUIRED ABSENCE OF BOTH CERVIX AND UTERUS: Chronic | ICD-10-CM

## 2020-07-25 DIAGNOSIS — Z90.49 ACQUIRED ABSENCE OF OTHER SPECIFIED PARTS OF DIGESTIVE TRACT: Chronic | ICD-10-CM

## 2020-07-25 LAB
APTT BLD: 33 SEC — SIGNIFICANT CHANGE UP (ref 27.5–35.5)
BASE EXCESS BLDA CALC-SCNC: 4.3 MMOL/L — HIGH (ref -2–2)
BASOPHILS # BLD AUTO: 0.09 K/UL — SIGNIFICANT CHANGE UP (ref 0–0.2)
BASOPHILS NFR BLD AUTO: 0.8 % — SIGNIFICANT CHANGE UP (ref 0–2)
BLOOD GAS COMMENTS ARTERIAL: SIGNIFICANT CHANGE UP
CULTURE RESULTS: SIGNIFICANT CHANGE UP
CULTURE RESULTS: SIGNIFICANT CHANGE UP
EOSINOPHIL # BLD AUTO: 0.32 K/UL — SIGNIFICANT CHANGE UP (ref 0–0.5)
EOSINOPHIL NFR BLD AUTO: 3 % — SIGNIFICANT CHANGE UP (ref 0–6)
GAS PNL BLDA: SIGNIFICANT CHANGE UP
HCO3 BLDA-SCNC: 28 MMOL/L — HIGH (ref 20–26)
HCT VFR BLD CALC: 42.4 % — SIGNIFICANT CHANGE UP (ref 34.5–45)
HGB BLD-MCNC: 13.2 G/DL — SIGNIFICANT CHANGE UP (ref 11.5–15.5)
HOROWITZ INDEX BLDA+IHG-RTO: SIGNIFICANT CHANGE UP
IMM GRANULOCYTES NFR BLD AUTO: 1.5 % — SIGNIFICANT CHANGE UP (ref 0–1.5)
INR BLD: 1.02 RATIO — SIGNIFICANT CHANGE UP (ref 0.88–1.16)
LYMPHOCYTES # BLD AUTO: 1.46 K/UL — SIGNIFICANT CHANGE UP (ref 1–3.3)
LYMPHOCYTES # BLD AUTO: 13.8 % — SIGNIFICANT CHANGE UP (ref 13–44)
MCHC RBC-ENTMCNC: 28.9 PG — SIGNIFICANT CHANGE UP (ref 27–34)
MCHC RBC-ENTMCNC: 31.1 GM/DL — LOW (ref 32–36)
MCV RBC AUTO: 93 FL — SIGNIFICANT CHANGE UP (ref 80–100)
MONOCYTES # BLD AUTO: 0.82 K/UL — SIGNIFICANT CHANGE UP (ref 0–0.9)
MONOCYTES NFR BLD AUTO: 7.7 % — SIGNIFICANT CHANGE UP (ref 2–14)
NEUTROPHILS # BLD AUTO: 7.76 K/UL — HIGH (ref 1.8–7.4)
NEUTROPHILS NFR BLD AUTO: 73.2 % — SIGNIFICANT CHANGE UP (ref 43–77)
PCO2 BLDA: 59 MMHG — HIGH (ref 35–45)
PH BLDA: 7.34 — LOW (ref 7.35–7.45)
PLATELET # BLD AUTO: 266 K/UL — SIGNIFICANT CHANGE UP (ref 150–400)
PO2 BLDA: 83 MMHG — SIGNIFICANT CHANGE UP (ref 83–108)
PROTHROM AB SERPL-ACNC: 11.8 SEC — SIGNIFICANT CHANGE UP (ref 10.6–13.6)
RBC # BLD: 4.56 M/UL — SIGNIFICANT CHANGE UP (ref 3.8–5.2)
RBC # FLD: 14.3 % — SIGNIFICANT CHANGE UP (ref 10.3–14.5)
SAO2 % BLDA: 96 % — SIGNIFICANT CHANGE UP (ref 95–99)
SPECIMEN SOURCE: SIGNIFICANT CHANGE UP
SPECIMEN SOURCE: SIGNIFICANT CHANGE UP
WBC # BLD: 10.61 K/UL — HIGH (ref 3.8–10.5)
WBC # FLD AUTO: 10.61 K/UL — HIGH (ref 3.8–10.5)

## 2020-07-25 PROCEDURE — 99285 EMERGENCY DEPT VISIT HI MDM: CPT

## 2020-07-25 PROCEDURE — 71045 X-RAY EXAM CHEST 1 VIEW: CPT | Mod: 26

## 2020-07-25 NOTE — ED PROVIDER NOTE - OBJECTIVE STATEMENT
HPI:  65yoM/F; with PMH signif for ???; now p/w    PMH: spinal stenosos, s/p several spinal surgeries, stage 3 kidney failure  SOCIAL: +social EtOH use, denies tobacco/illicit drug use HPI:  65yoM/F; with PMH signif for ???; now p/w  Pt was admitted to [facility] on July 20th for sepsis, source of infection was open wound with drainage to her R foot.  Pt states she was following up with a podietrist for the past 2 weeks for the ulcers to her b/l feet, however shortly before July 20th the ulcer to her R foot sudddenly "split open" and began draining pus, and pt was admitted to [facility] from July 20th-23rd, while admitted she was placed on vancomycin and pepricilin, and had vascular studies showing very little perfusion to her b/l feet.  She had intermittent fevers while admitted, was found to have a R foot fx, with a loose nail and bleeding.  Pt states the doctors were considering amputation of her L foot, however the swelling improved and they decided not to.  Pt was placed on 3L oxygen while admitted, states she was sating approx 86% while at rest, however when discharged home she was not placed on home oxygen.  Pt ambulates only with a walker at baseline, ambulating only to her sitting chair or comode.  While admitted, pt's DM medications were changed, she was on Novulin N (76 units in the morning, 70 units at night) and novalog 5 units TID before meals.  She was changed off of novulin and placed on Lantis, 20 units TID.    Since being discharged home yesterday, pt reports having a non-productive cough, nausea, and SOB with mild exertion.  No fevers or chills since being discharged home. No pain with deep inspiration.  No CP.    Pt lives at home with her 2 sons, one son works in an Urgent Care.    PMH: spinal stenosis, s/p several spinal surgeries, stage 3 kidney failure  SOCIAL: +social EtOH use, denies tobacco/illicit drug use    Pulmonologist: Dr. Retana (?)  Endocrine: Dr. Pyle, pt has never seen in person.  Podiatrist: Dr. Charles.  PMD: Dr. Paula Kee (?) HPI:  65yoM/F; with PMH signif for ???; now p/w worsening SOB and cough yesterday.  Pt was admitted to [facility] on July 20th for sepsis, source of infection was open wound with drainage to her R foot.  Pt states she was following up with a podiatrist for the past 2 weeks for the ulcers to her b/l feet, however shortly before July 20th the ulcer to her R foot suddenly "split open" and began draining pus, and pt was admitted to [facility] from July 20th-23rd, while admitted she was placed on vancomycin and Zosyn, and had vascular studies showing very little perfusion to her b/l feet.  She had intermittent fevers while admitted, was found to have a R foot fx, with a loose nail and bleeding.  Pt states the doctors were considering amputation of her L foot, however the swelling improved and they decided not to.  Pt was placed on 3L oxygen while admitted, states she was sating approx 86% while at rest, however when discharged home she was not placed on home oxygen.  Since being discharged home yesterday, pt reports having a non-productive cough, nausea, and SOB with mild exertion.  No fevers or chills since being discharged home. No pain with deep inspiration.  No CP.    While admitted, pt's DM medications were changed, she was on Novulin N (76 units in the morning, 70 units at night) and novalog 5 units TID before meals.  She was changed off of novulin and placed on Lantis, 20 units TID.  Pt ambulates only with a walker at baseline, ambulating only to her sitting chair or commode.  Pt lives at home with her 2 sons, one son works in an Urgent Care.  She tested negative for COVID while admitted to [facility].    PMH: spinal stenosis, s/p several spinal surgeries, stage 3 kidney failure  SOCIAL: +social EtOH use, denies tobacco/illicit drug use    Pulmonologist: Dr. Retana (?)  Endocrine: Dr. Pyle, pt has never seen in person.  Podiatrist: Dr. Charles.  PMD: Dr. Paula Kee (?) HPI:  65yoM/F; with PMH signif for DM, HTN, CKD; now p/w worsening SOB and cough yesterday.  Pt was admitted to [facility] on July 20th for sepsis, source of infection was open wound with drainage to her R foot.  Pt states she was following up with a podiatrist for the past 2 weeks for the ulcers to her b/l feet, however shortly before July 20th the ulcer to her R foot suddenly "split open" and began draining pus, and pt was admitted to [place] from July 20th-23rd, while admitted she was placed on vancomycin and Zosyn, and had vascular studies showing very little perfusion to her b/l feet.  She had intermittent fevers while admitted, was found to have a R foot fx, with a loose nail and bleeding.  Pt states the doctors were considering amputation of her L foot, however the swelling improved and they decided not to.  Pt was placed on 3L oxygen while admitted, states she was sating approx 86% while at rest, however when discharged home she was not placed on home oxygen.  Since being discharged home yesterday, pt reports having a non-productive cough, nausea, and SOB with mild exertion.  Pt called her PMD and was advised to come into the ED for evaluation of cellulitis.  No fevers or chills since being discharged home. No pain with deep inspiration.  No CP.    While admitted, pt's DM medications were changed, she was on Novulin N (76 units in the morning, 70 units at night) and novalog 5 units TID before meals.  She was changed off of novulin and placed on Lantis, 20 units TID.  Pt ambulates only with a walker at baseline, ambulating only to her sitting chair or commode.  Pt lives at home with her 2 sons, one son works in an Urgent Care.  She tested negative for COVID while admitted to [facility].  No hx CHF, no cardiac stents.    PMH: spinal stenosis, s/p several spinal surgeries, stage 3 kidney failure, DVT, DM, dyslipidemia, HTN, cholecystectomy, appendectomy  SOCIAL: +social EtOH use, denies tobacco/illicit drug use    Pulmonologist: Dr. Retana (?)  Endocrine: Dr. Pyle, pt has never seen in person.  Podiatrist: Dr. Charles.  PMD: Dr. Paula Kee (?) HPI:  65yoM/F; with PMH signif for DM, HTN, CKD; now p/w worsening SOB and cough yesterday.  Pt was admitted to [facility] on July 20th for sepsis, source of infection was open wound with drainage to her R foot.  Pt states she was following up with a podiatrist for the past 2 weeks for the ulcers to her b/l feet, however shortly before July 20th the ulcer to her R foot suddenly "split open" and began draining pus, and pt was admitted to [place] from July 20th-23rd, while admitted she was placed on vancomycin and Zosyn, and had vascular studies showing very little perfusion to her b/l feet.  She had intermittent fevers while admitted, was found to have a R foot fx, with a loose nail and bleeding.  Pt states the doctors were considering amputation of her L foot, however the swelling improved and they decided not to.  Pt was placed on 3L oxygen while admitted, states she was sating approx 86% while at rest, however when discharged home she was not placed on home oxygen.  Since being discharged home yesterday, pt reports having a non-productive cough, nausea, and SOB with mild exertion.  Pt called her PMD and was advised to come into the ED for evaluation of cellulitis.  No fevers or chills since being discharged home. No pain with deep inspiration.  No CP.    While admitted, pt's DM medications were changed, she was on Novolin N (76 units in the morning, 70 units at night) and Novolog 5 units TID before meals.  She was changed off of Novolin and placed on Lantis, 20 units TID.  Pt ambulates only with a walker at baseline, ambulating only to her sitting chair or commode.  Pt lives at home with her 2 sons, one son works in an Urgent Care.  She tested negative for COVID while admitted to [place].  No hx CHF, no cardiac stents.  PMH: spinal stenosis, s/p several spinal surgeries, stage 3 kidney failure, DVT, DM, dyslipidemia, HTN, cholecystectomy, appendectomy  SOCIAL: +social EtOH use, denies tobacco/illicit drug use    Pulmonologist: Dr. Retana (?)  Endocrine: Dr. Pyle, pt has never seen in person.  Podiatrist: Dr. Charles.  PMD: Dr. Paula Kee (?) HPI:  65yoM/F; with PMH signif for DM, HTN, CKD; now p/w worsening SOB and cough yesterday.  Pt was admitted to [facility] on July 20th for sepsis, source of infection was open wound with drainage to her R foot.  Pt states she was following up with a podiatrist for the past 2 weeks for the ulcers to her b/l feet, however shortly before July 20th the ulcer to her R foot suddenly "split open" and began draining pus, and pt was admitted to [place] from July 20th-23rd, while admitted she was placed on vancomycin and Zosyn, and had vascular studies showing very little perfusion to her b/l feet.  She had intermittent fevers while admitted, was found to have a R foot fx, with a loose nail and bleeding.  Pt states the doctors were considering amputation of her L foot, however the swelling improved and they decided not to.  Pt was placed on 3L oxygen while admitted, states she was sating approx 86% while at rest, however when discharged home she was not placed on home oxygen.  Since being discharged home yesterday, pt reports having a non-productive cough, nausea, and SOB with mild exertion.  reports using pulse ox at home and it was 85-86% today despite nebulizer treatments.  Pt called her PMD and was advised to come into the ED for evaluation of cellulitis.  No fevers or chills since being discharged home. No pain with deep inspiration.  No CP.    While admitted, pt's DM medications were changed, she was on Novolin N (76 units in the morning, 70 units at night) and Novolog 5 units TID before meals.  She was changed off of Novolin and placed on Lantis, 20 units TID.  Pt ambulates only with a walker at baseline, ambulating only to her sitting chair or commode.  Pt lives at home with her 2 sons, one son works in an Urgent Care.  She tested negative for COVID while admitted to [place].  No hx CHF, no cardiac stents.  PMH: spinal stenosis, s/p several spinal surgeries, stage 3 kidney failure, DVT, DM, dyslipidemia, HTN, cholecystectomy, appendectomy  SOCIAL: +social EtOH use, denies tobacco/illicit drug use    Pulmonologist: Dr. Retana (?)  Endocrine: Dr. Pyle, pt has never seen in person.  Podiatrist: Dr. Charles.  PMD: Dr. Paula Kee (?)

## 2020-07-25 NOTE — ED PROVIDER NOTE - NS ED ROS FT
Constitutional: (-) fever  (-)chills  (-)sweats  Eyes/ENT: (-) blurry vision, (-) epistaxis  (-)rhinorrhea   (-) sore throat    Cardiovascular: (-) chest pain, (-) palpitations (-) edema   Respiratory: (+) cough, (+) shortness of breath   Gastrointestinal: (+)nausea  (-)vomiting, (-) diarrhea  (-) abdominal pain   :  (-)dysuria, (-)frequency, (-)urgency, (-)hematuria  Musculoskeletal: (-) neck pain, (-) back pain, (-) joint pain  Integumentary: (-) rash, (-) edema  Neurological: (-) headache, (-) altered mental status  (-)LOC

## 2020-07-25 NOTE — ED PROVIDER NOTE - PHYSICAL EXAMINATION
General:     NAD, well-nourished, well-appearing  Head:     NC/AT, EOMI, oral mucosa moist  Neck:     trachea midline  Lungs:     CTA b/l, no w/r/r  CVS:     S1S2, RRR, no m/g/r  Abd:     +BS, s/nt/nd, no organomegaly  Ext:    2+ radial and pedal pulses, no c/c/e  Neuro: AAOx3, no sensory/motor deficits General:     NAD, well-nourished, well-appearing  Head:     NC/AT, EOMI, oral mucosa moist  Neck:     trachea midline  Lungs:     trace expiratory crackles at b/l bases, no w/r/r  CVS:     S1S2, RRR, no m/g/r  Abd:     +BS, s/nt/nd, no organomegaly  Ext:    L foot with healing 2nd toe wound, peripheral cyanosis which pt states is baseline and currently improved, decreased DP pulse.  R foot with chronic wound, 4cm over medial mid foot, 6cm healing ulceration over mid plantar foot, well healing, dressing with trace serosanguinous discharge, 1+ pedal edema.  Neuro: AAOx3, no sensory/motor deficits

## 2020-07-25 NOTE — ED ADULT TRIAGE NOTE - CHIEF COMPLAINT QUOTE
Pt. BIBA with sob that began today.  Pt. d/c from [place] yesterday after being treated for sepsis.  Pt. was on 3L supplemental O2 in hospital but not d/c home with any; son noticed pt to be sob today and noted home O2 in the 80's.

## 2020-07-25 NOTE — ED PROVIDER NOTE - PMH
Cataract    Cervical Stenosis of Spine    CKD (chronic kidney disease)    Deep Vein Thrombosis (DVT)  Left leg, 2004, treated and resolved  Diabetes Mellitus Type II    Dyslipidemia    Edema of lower extremity  on lasix  Endometrial Hyperplasia    HTN (Hypertension)    Insomnia    Morbid Obesity    Spinal Stenosis, Lumbar    Vitamin D deficiency

## 2020-07-25 NOTE — ED PROVIDER NOTE - PSH
C Section 1994    Cataract extracted with lens implant 1998  Right  Cervical Spinal Stenosis surgery x2 (01/2002, 7/2002)    Cholecystectomy/appendectomy @ age 25    D&C 2008  hysteroscopy, endometrial hyperplasia, 2009  D&C x2 1980's    Endometrial biopsy 12/02/09    H/O colonoscopy  1998  H/O laser iridotomy  left eye, 2016  S/P appendectomy    S/P cholecystectomy    S/P total abdominal hysterectomy and bilateral salpingo-oophorectomy    Tonsillectomy as a child

## 2020-07-25 NOTE — ED PROVIDER NOTE - CLINICAL SUMMARY MEDICAL DECISION MAKING FREE TEXT BOX
patient with worsening hypoxia and dyspnea, found to have mild vascular congestion on cxr, hypoxia in ED requiring O2, elevated bnp. will admit for further management.

## 2020-07-26 DIAGNOSIS — I50.41 ACUTE COMBINED SYSTOLIC (CONGESTIVE) AND DIASTOLIC (CONGESTIVE) HEART FAILURE: ICD-10-CM

## 2020-07-26 LAB
ALBUMIN SERPL ELPH-MCNC: 3.5 G/DL — SIGNIFICANT CHANGE UP (ref 3.3–5.2)
ALP SERPL-CCNC: 154 U/L — HIGH (ref 40–120)
ALT FLD-CCNC: 24 U/L — SIGNIFICANT CHANGE UP
ANION GAP SERPL CALC-SCNC: 13 MMOL/L — SIGNIFICANT CHANGE UP (ref 5–17)
ANION GAP SERPL CALC-SCNC: 15 MMOL/L — SIGNIFICANT CHANGE UP (ref 5–17)
AST SERPL-CCNC: 22 U/L — SIGNIFICANT CHANGE UP
BILIRUB SERPL-MCNC: 0.4 MG/DL — SIGNIFICANT CHANGE UP (ref 0.4–2)
BUN SERPL-MCNC: 39 MG/DL — HIGH (ref 8–20)
BUN SERPL-MCNC: 43 MG/DL — HIGH (ref 8–20)
CALCIUM SERPL-MCNC: 9.4 MG/DL — SIGNIFICANT CHANGE UP (ref 8.6–10.2)
CALCIUM SERPL-MCNC: 9.8 MG/DL — SIGNIFICANT CHANGE UP (ref 8.6–10.2)
CHLORIDE SERPL-SCNC: 99 MMOL/L — SIGNIFICANT CHANGE UP (ref 98–107)
CHLORIDE SERPL-SCNC: 99 MMOL/L — SIGNIFICANT CHANGE UP (ref 98–107)
CK SERPL-CCNC: 100 U/L — SIGNIFICANT CHANGE UP (ref 25–170)
CO2 SERPL-SCNC: 25 MMOL/L — SIGNIFICANT CHANGE UP (ref 22–29)
CO2 SERPL-SCNC: 28 MMOL/L — SIGNIFICANT CHANGE UP (ref 22–29)
CREAT SERPL-MCNC: 2.34 MG/DL — HIGH (ref 0.5–1.3)
CREAT SERPL-MCNC: 2.41 MG/DL — HIGH (ref 0.5–1.3)
GLUCOSE BLDC GLUCOMTR-MCNC: 148 MG/DL — HIGH (ref 70–99)
GLUCOSE BLDC GLUCOMTR-MCNC: 152 MG/DL — HIGH (ref 70–99)
GLUCOSE BLDC GLUCOMTR-MCNC: 190 MG/DL — HIGH (ref 70–99)
GLUCOSE BLDC GLUCOMTR-MCNC: 219 MG/DL — HIGH (ref 70–99)
GLUCOSE SERPL-MCNC: 175 MG/DL — HIGH (ref 70–99)
GLUCOSE SERPL-MCNC: 200 MG/DL — HIGH (ref 70–99)
MAGNESIUM SERPL-MCNC: 2.2 MG/DL — SIGNIFICANT CHANGE UP (ref 1.6–2.6)
NT-PROBNP SERPL-SCNC: 2235 PG/ML — HIGH (ref 0–300)
POTASSIUM SERPL-MCNC: 4.7 MMOL/L — SIGNIFICANT CHANGE UP (ref 3.5–5.3)
POTASSIUM SERPL-MCNC: 5 MMOL/L — SIGNIFICANT CHANGE UP (ref 3.5–5.3)
POTASSIUM SERPL-SCNC: 4.7 MMOL/L — SIGNIFICANT CHANGE UP (ref 3.5–5.3)
POTASSIUM SERPL-SCNC: 5 MMOL/L — SIGNIFICANT CHANGE UP (ref 3.5–5.3)
PROT SERPL-MCNC: 7.1 G/DL — SIGNIFICANT CHANGE UP (ref 6.6–8.7)
SARS-COV-2 IGG SERPL QL IA: NEGATIVE — SIGNIFICANT CHANGE UP
SARS-COV-2 IGM SERPL IA-ACNC: 0.09 INDEX — SIGNIFICANT CHANGE UP
SODIUM SERPL-SCNC: 139 MMOL/L — SIGNIFICANT CHANGE UP (ref 135–145)
SODIUM SERPL-SCNC: 140 MMOL/L — SIGNIFICANT CHANGE UP (ref 135–145)
TROPONIN T SERPL-MCNC: 0.01 NG/ML — SIGNIFICANT CHANGE UP (ref 0–0.06)
TROPONIN T SERPL-MCNC: <0.01 NG/ML — SIGNIFICANT CHANGE UP (ref 0–0.06)

## 2020-07-26 PROCEDURE — 12345: CPT | Mod: NC

## 2020-07-26 PROCEDURE — 99223 1ST HOSP IP/OBS HIGH 75: CPT

## 2020-07-26 PROCEDURE — 93970 EXTREMITY STUDY: CPT | Mod: 26

## 2020-07-26 RX ORDER — DEXTROSE 50 % IN WATER 50 %
12.5 SYRINGE (ML) INTRAVENOUS ONCE
Refills: 0 | Status: DISCONTINUED | OUTPATIENT
Start: 2020-07-26 | End: 2020-07-29

## 2020-07-26 RX ORDER — IPRATROPIUM/ALBUTEROL SULFATE 18-103MCG
3 AEROSOL WITH ADAPTER (GRAM) INHALATION ONCE
Refills: 0 | Status: COMPLETED | OUTPATIENT
Start: 2020-07-26 | End: 2020-07-26

## 2020-07-26 RX ORDER — DEXTROSE 50 % IN WATER 50 %
25 SYRINGE (ML) INTRAVENOUS ONCE
Refills: 0 | Status: DISCONTINUED | OUTPATIENT
Start: 2020-07-26 | End: 2020-07-29

## 2020-07-26 RX ORDER — INSULIN LISPRO 100/ML
5 VIAL (ML) SUBCUTANEOUS
Refills: 0 | Status: DISCONTINUED | OUTPATIENT
Start: 2020-07-26 | End: 2020-07-29

## 2020-07-26 RX ORDER — GLUCAGON INJECTION, SOLUTION 0.5 MG/.1ML
1 INJECTION, SOLUTION SUBCUTANEOUS ONCE
Refills: 0 | Status: DISCONTINUED | OUTPATIENT
Start: 2020-07-26 | End: 2020-07-29

## 2020-07-26 RX ORDER — ASPIRIN/CALCIUM CARB/MAGNESIUM 324 MG
81 TABLET ORAL DAILY
Refills: 0 | Status: DISCONTINUED | OUTPATIENT
Start: 2020-07-26 | End: 2020-07-29

## 2020-07-26 RX ORDER — COLLAGENASE CLOSTRIDIUM HIST. 250 UNIT/G
1 OINTMENT (GRAM) TOPICAL DAILY
Refills: 0 | Status: DISCONTINUED | OUTPATIENT
Start: 2020-07-26 | End: 2020-07-29

## 2020-07-26 RX ORDER — INSULIN GLARGINE 100 [IU]/ML
20 INJECTION, SOLUTION SUBCUTANEOUS AT BEDTIME
Refills: 0 | Status: DISCONTINUED | OUTPATIENT
Start: 2020-07-26 | End: 2020-07-29

## 2020-07-26 RX ORDER — SODIUM CHLORIDE 9 MG/ML
1000 INJECTION, SOLUTION INTRAVENOUS
Refills: 0 | Status: DISCONTINUED | OUTPATIENT
Start: 2020-07-26 | End: 2020-07-29

## 2020-07-26 RX ORDER — POLYETHYLENE GLYCOL 3350 17 G/17G
17 POWDER, FOR SOLUTION ORAL DAILY
Refills: 0 | Status: DISCONTINUED | OUTPATIENT
Start: 2020-07-26 | End: 2020-07-29

## 2020-07-26 RX ORDER — INSULIN LISPRO 100/ML
VIAL (ML) SUBCUTANEOUS
Refills: 0 | Status: DISCONTINUED | OUTPATIENT
Start: 2020-07-26 | End: 2020-07-29

## 2020-07-26 RX ORDER — OXYCODONE AND ACETAMINOPHEN 5; 325 MG/1; MG/1
1 TABLET ORAL EVERY 6 HOURS
Refills: 0 | Status: DISCONTINUED | OUTPATIENT
Start: 2020-07-26 | End: 2020-07-29

## 2020-07-26 RX ORDER — BUDESONIDE AND FORMOTEROL FUMARATE DIHYDRATE 160; 4.5 UG/1; UG/1
2 AEROSOL RESPIRATORY (INHALATION)
Refills: 0 | Status: DISCONTINUED | OUTPATIENT
Start: 2020-07-26 | End: 2020-07-29

## 2020-07-26 RX ORDER — INSULIN LISPRO 100/ML
VIAL (ML) SUBCUTANEOUS AT BEDTIME
Refills: 0 | Status: DISCONTINUED | OUTPATIENT
Start: 2020-07-26 | End: 2020-07-29

## 2020-07-26 RX ORDER — FUROSEMIDE 40 MG
20 TABLET ORAL ONCE
Refills: 0 | Status: COMPLETED | OUTPATIENT
Start: 2020-07-26 | End: 2020-07-26

## 2020-07-26 RX ORDER — ATORVASTATIN CALCIUM 80 MG/1
80 TABLET, FILM COATED ORAL AT BEDTIME
Refills: 0 | Status: DISCONTINUED | OUTPATIENT
Start: 2020-07-26 | End: 2020-07-29

## 2020-07-26 RX ORDER — FUROSEMIDE 40 MG
40 TABLET ORAL DAILY
Refills: 0 | Status: DISCONTINUED | OUTPATIENT
Start: 2020-07-26 | End: 2020-07-29

## 2020-07-26 RX ORDER — CHOLECALCIFEROL (VITAMIN D3) 125 MCG
1000 CAPSULE ORAL DAILY
Refills: 0 | Status: DISCONTINUED | OUTPATIENT
Start: 2020-07-26 | End: 2020-07-29

## 2020-07-26 RX ORDER — OXYCODONE HYDROCHLORIDE 5 MG/1
80 TABLET ORAL EVERY 12 HOURS
Refills: 0 | Status: DISCONTINUED | OUTPATIENT
Start: 2020-07-26 | End: 2020-07-29

## 2020-07-26 RX ORDER — DEXTROSE 50 % IN WATER 50 %
15 SYRINGE (ML) INTRAVENOUS ONCE
Refills: 0 | Status: DISCONTINUED | OUTPATIENT
Start: 2020-07-26 | End: 2020-07-29

## 2020-07-26 RX ORDER — HEPARIN SODIUM 5000 [USP'U]/ML
7500 INJECTION INTRAVENOUS; SUBCUTANEOUS EVERY 8 HOURS
Refills: 0 | Status: DISCONTINUED | OUTPATIENT
Start: 2020-07-26 | End: 2020-07-29

## 2020-07-26 RX ORDER — ALBUTEROL 90 UG/1
2 AEROSOL, METERED ORAL EVERY 6 HOURS
Refills: 0 | Status: DISCONTINUED | OUTPATIENT
Start: 2020-07-26 | End: 2020-07-29

## 2020-07-26 RX ORDER — CLONAZEPAM 1 MG
0.5 TABLET ORAL DAILY
Refills: 0 | Status: DISCONTINUED | OUTPATIENT
Start: 2020-07-26 | End: 2020-07-29

## 2020-07-26 RX ADMIN — Medication 1000 UNIT(S): at 12:12

## 2020-07-26 RX ADMIN — INSULIN GLARGINE 20 UNIT(S): 100 INJECTION, SOLUTION SUBCUTANEOUS at 21:10

## 2020-07-26 RX ADMIN — ATORVASTATIN CALCIUM 80 MILLIGRAM(S): 80 TABLET, FILM COATED ORAL at 21:10

## 2020-07-26 RX ADMIN — Medication 5 UNIT(S): at 08:23

## 2020-07-26 RX ADMIN — Medication 1 APPLICATION(S): at 12:13

## 2020-07-26 RX ADMIN — HEPARIN SODIUM 7500 UNIT(S): 5000 INJECTION INTRAVENOUS; SUBCUTANEOUS at 21:10

## 2020-07-26 RX ADMIN — Medication 2: at 17:15

## 2020-07-26 RX ADMIN — Medication 5 UNIT(S): at 17:15

## 2020-07-26 RX ADMIN — OXYCODONE HYDROCHLORIDE 80 MILLIGRAM(S): 5 TABLET ORAL at 05:59

## 2020-07-26 RX ADMIN — Medication 3 MILLILITER(S): at 02:48

## 2020-07-26 RX ADMIN — Medication 5 UNIT(S): at 12:10

## 2020-07-26 RX ADMIN — Medication 1 TABLET(S): at 05:17

## 2020-07-26 RX ADMIN — Medication 81 MILLIGRAM(S): at 12:13

## 2020-07-26 RX ADMIN — Medication 1: at 12:09

## 2020-07-26 RX ADMIN — OXYCODONE AND ACETAMINOPHEN 1 TABLET(S): 5; 325 TABLET ORAL at 21:50

## 2020-07-26 RX ADMIN — POLYETHYLENE GLYCOL 3350 17 GRAM(S): 17 POWDER, FOR SOLUTION ORAL at 12:12

## 2020-07-26 RX ADMIN — Medication 0: at 08:22

## 2020-07-26 RX ADMIN — HEPARIN SODIUM 5000 UNIT(S): 5000 INJECTION INTRAVENOUS; SUBCUTANEOUS at 13:26

## 2020-07-26 RX ADMIN — BUDESONIDE AND FORMOTEROL FUMARATE DIHYDRATE 2 PUFF(S): 160; 4.5 AEROSOL RESPIRATORY (INHALATION) at 21:32

## 2020-07-26 RX ADMIN — BUDESONIDE AND FORMOTEROL FUMARATE DIHYDRATE 2 PUFF(S): 160; 4.5 AEROSOL RESPIRATORY (INHALATION) at 09:25

## 2020-07-26 RX ADMIN — OXYCODONE AND ACETAMINOPHEN 1 TABLET(S): 5; 325 TABLET ORAL at 12:11

## 2020-07-26 RX ADMIN — OXYCODONE AND ACETAMINOPHEN 1 TABLET(S): 5; 325 TABLET ORAL at 20:52

## 2020-07-26 RX ADMIN — OXYCODONE HYDROCHLORIDE 80 MILLIGRAM(S): 5 TABLET ORAL at 17:58

## 2020-07-26 RX ADMIN — HEPARIN SODIUM 7500 UNIT(S): 5000 INJECTION INTRAVENOUS; SUBCUTANEOUS at 05:58

## 2020-07-26 RX ADMIN — Medication 20 MILLIGRAM(S): at 02:48

## 2020-07-26 RX ADMIN — Medication 1 TABLET(S): at 17:58

## 2020-07-26 NOTE — H&P ADULT - NSHPLABSRESULTS_GEN_ALL_CORE
13.2   10.61 )-----------( 266      ( 25 Jul 2020 23:29 )             42.4         07-25    139  |  99  |  39.0<H>  ----------------------------<  200<H>  5.0   |  25.0  |  2.34<H>    Ca    9.8      25 Jul 2020 23:29  Mg     2.2     07-25    TPro  7.1  /  Alb  3.5  /  TBili  0.4  /  DBili  x   /  AST  22  /  ALT  24  /  AlkPhos  154<H>  07-25 13.2   10.61 )-----------( 266      ( 25 Jul 2020 23:29 )             42.4         07-25    139  |  99  |  39.0<H>  ----------------------------<  200<H>  5.0   |  25.0  |  2.34<H>    Ca    9.8      25 Jul 2020 23:29  Mg     2.2     07-25    TPro  7.1  /  Alb  3.5  /  TBili  0.4  /  DBili  x   /  AST  22  /  ALT  24  /  AlkPhos  154<H>  07-25    EKG: pending

## 2020-07-26 NOTE — CONSULT NOTE ADULT - ASSESSMENT
Pt states that she has recently been admitted to [facility] for treatment for septic d/t wound in right foot. Prior to this hospital admission, patient states she has been experiencing  chest pain over the past three months that has been increasing in severity. Chest Pain is described as intermittent, left sided, non radiating 7/10, cramping, no pain modifying factors noted, with associated SOB. Pt had o2 sat checked at home which was in the 80s. Pt does not have home o2. In ED, Tropx2-negative, BNP-2235, Xray- no lung consolidation, ECG-NSR HR @ 75, T wave abnormalities in lateral leads. Pt currently presents with SOB while on 3L NC. Pt also c/o  baseline back pain. Upon assessment, pt has exp. wheezing and limited air movement, bilateral lower extremity edema with dusky appearance of toes.         Chest Pain  -ECG-NSR HR @ 75, T wave abnormalities in lateral leads  -Xray- no lung consolidation  -Tropx2-negative  -Echo 9/2019-mitral annular calcification, minimal MR, thickened AV leaflets, borderline concentric LVH, normal LV sys. fx. EF 59%, no segmental wall motion abnormalities, minimal TR  -Echo-pending, to assess for RWMA, and fluid status  -US duplex venous lower ext- r/o DVT (pt hx of DVT and immobility)    Dyspnea  -BNP-2235  -Xray- no lung consolidation  -Echo-pending, to assess for RWMA, and fluid status  -Cont. use of nebulizer tx  - Cont. Lasix IVP 40mg daily  -Steroids?     Acute on CKD  -BUN/Creatnin- 43/2.41  -Nephrology consult for diuresis recommendations    Preliminary evaluation, please await complete evaluation by Dr. Horton

## 2020-07-26 NOTE — H&P ADULT - NSHPPHYSICALEXAM_GEN_ALL_CORE
Vital Signs Last 24 Hrs  T(C): 36.9 (25 Jul 2020 21:25), Max: 36.9 (25 Jul 2020 21:25)  T(F): 98.4 (25 Jul 2020 21:25), Max: 98.4 (25 Jul 2020 21:25)  HR: 77 (26 Jul 2020 02:52) (77 - 91)  BP: 140/61 (26 Jul 2020 02:52) (140/61 - 159/79)  BP(mean): --  RR: 20 (26 Jul 2020 02:52) (20 - 20)  SpO2: 99% (26 Jul 2020 02:52) (87% - 99%)    GENERAL: Obese female, not in acute distress  EYES:  Clear conjunctiva, extraocular movement intact  ENT: Moist mucous membranes  RESP:  Non-labored breathing pattern, diminished bilateral breath sounds, no crackles or wheezing appreciated   CV: Regular rate and rhythm, no murmurs appreciated, bilateral leg edema  GI: Soft, non-tender, non-distended  NEURO: Awake, alert, conversant, UE strength 5/5, LE strength 3/5 likely related to poor effort, light touch sensation grossly intact  PSYCH: Calm, cooperative  SKIN: R-foot with ~4cm wound on side of foot, no purulent drainage or bleeding noted, legs with skin discoloration and some hyperkeratosis with skin peeling

## 2020-07-26 NOTE — H&P ADULT - HISTORY OF PRESENT ILLNESS
65yoF hx morbid obesity, chronic pain syndrome from spinal stenosis on opioids, HTN, DM complicated by neuropathy and charcot foot, CKD, chronic leg edema due to venous stasis, discharged from [facility] 2 days ago for sepsis due to right foot wound infection presenting with progressive dyspnea and episodes of hypoxia at home. Pt reports having chronic dyspnea on exertion and at rest for several months, but it has been worsening since she was discharged from the hospital 2 days ago. She checks her pulse ox at home and has noticed readings in the 80s. Her baseline leg swelling is also worse than usual.  She has difficulty laying flat due to her spinal stenosis.  She also has been having intermittent, non-radiating left sided chest pain which reports as cramping in nature, lasting a few minutes in duration and not associated with exertion or rest.  Reports that she was recently diagnosed with asthma by her pulmonologist, placed on maintenance inhalers which she uses but still having persistent symptoms. She was advised to take a sleep study (per outpatient records due to suspect OHS) which has been on hold due to COVID pandemic.  Pt reports she had a nuclear stress this several months ago by cardiologist and was told that the study was abnormal, however did not undergo a cardiac cath.  She denies known hx of CHF and reports being on diuretic for leg swelling due to venous stasis.     On admission, pt with worsening proBNP (2235) compared with 500 few days ago and CXR on my read showing increased interstitial markings. 65yoF hx morbid obesity, chronic pain syndrome from spinal stenosis on opioids, HTN, DM complicated by neuropathy and charcot foot, CKD, chronic leg edema due to venous stasis, discharged from [facility] 2 days ago for sepsis due to right foot wound infection presenting with progressive dyspnea and episodes of hypoxia at home. Pt reports having chronic dyspnea on exertion and at rest for several months, but it has been worsening since she was discharged from the hospital 2 days ago. She checks her pulse ox at home and has noticed readings in the 80s. Her baseline leg swelling is also worse than usual.  She has difficulty laying flat due to her spinal stenosis.  She also has been having intermittent, non-radiating left sided chest pain which reports as cramping in nature, lasting a few minutes in duration and not associated with exertion or rest.  Reports that she was recently diagnosed with asthma by her pulmonologist, placed on maintenance inhalers which she uses but still having persistent symptoms. She was advised to take a sleep study (per outpatient records due to suspect OHS) which has been on hold due to COVID pandemic.  Pt reports she had a nuclear stress this several months ago by cardiologist and was told that the study was abnormal, however did not undergo a cardiac cath.  She denies known hx of CHF and reports being on diuretic for leg swelling due to venous stasis.     On admission, pt with worsening proBNP (2235) compared with 510 few days ago and CXR on my read showing increased interstitial markings.

## 2020-07-26 NOTE — CONSULT NOTE ADULT - ATTENDING COMMENTS
66 yo female seen with SOB  Pt with morbid obesity, possible anil although not tested. pt denies any hx of cad. one episode of DVT many years ago but no pulmonary hypertension. pt is bedbound due to spinal stenosis. no cp but low pox on exam today. Recent hospitalization at [facility].  Vascular studies reviewed as well.   CXR poor quality due to her size. Pt with CRI, Cr increased since 2018.  EKG with NSST changes  Repeat TTE to exclude RV dysfunction  Pulses normal in lower ext.  Right foot/leg wound on abx  Recommend renal evaluation  plan ct chest no contrast  Stress test in AM  I will follow with you.

## 2020-07-26 NOTE — ED ADULT NURSE NOTE - CHIEF COMPLAINT QUOTE
Pt. BIBA with sob that began today.  Pt. d/c from [place] yesterday after being treated for sepsis.  Pt. was on 3L supplemental O2 in hospital but not d/c home with any; son noticed pt to be sob today and noted home O2 in the 80's.

## 2020-07-26 NOTE — CONSULT NOTE ADULT - SUBJECTIVE AND OBJECTIVE BOX
[facility]                                                       [facility]                                                               Office:  [address] [address]                                                              Telephone: [phone]. Fax:[phone]                                                                        CARDIOLOGY CONSULTATION NOTE                                                                                             Consult requested by:  Dr. Morales  Reason for Consultation: Chest Pain  History obtained by: Patient and medical record   obtained: No    Chief complaint:    Patient is a 65y old  Female who presents with a chief complaint of acute hypoxemic respiratory failure, acute decompensated heart failure (26 Jul 2020 04:36)        HPI:  65yoF hx morbid obesity, chronic pain syndrome from spinal stenosis on opioids, HTN, DM complicated by neuropathy and charcot foot, CKD, chronic leg edema due to venous stasis, discharged from [facility] 2 days ago for sepsis due to right foot wound infection presenting with progressive dyspnea and episodes of hypoxia at home. Pt reports having chronic dyspnea on exertion and at rest for several months, but it has been worsening since she was discharged from the hospital 2 days ago. She checks her pulse ox at home and has noticed readings in the 80s. Her baseline leg swelling is also worse than usual.  She has difficulty laying flat due to her spinal stenosis.  She also has been having intermittent, non-radiating left sided chest pain which reports as cramping in nature, lasting a few minutes in duration and not associated with exertion or rest.  Reports that she was recently diagnosed with asthma by her pulmonologist, placed on maintenance inhalers which she uses but still having persistent symptoms. She was advised to take a sleep study (per outpatient records due to suspect OHS) which has been on hold due to COVID pandemic.  Pt reports she had a nuclear stress this several months ago by cardiologist and was told that the study was abnormal, however did not undergo a cardiac cath.  She denies known hx of CHF and reports being on diuretic for leg swelling due to venous stasis.   On admission, pt with worsening proBNP (2235) compared with 510 few days ago and CXR on my read showing increased interstitial markings. (26 Jul 2020 04:36)  Above appreciated  Pt states that she has recently been admitted to [facility] for treatment for septic d/t wound in right foot. Prior to this hospital admission, patient states she has been experiencing  chest pain over the past three months that has been increasing in severity. Chest Pain is described as intermittent, left sided, non radiating 7/10, cramping, no pain modifying factors noted, with associated SOB. Pt had o2 sat checked at home which was in the 80s. Pt does not have home o2. In ED, Tropx2-negative, BNP-2235, Xray- no lung consolidation, ECG-NSR HR @ 75, T wave abnormalities in lateral leads. Pt currently presents with SOB while on 3L NC. Pt also c/o  baseline back pain. Upon assessment, pt has exp. wheezing and limited air movement, bilateral lower extremity edema with dusky appearance of toes.               REVIEW OF SYMPTOMS:     CONSTITUTIONAL: No fever, weight loss, or fatigue  ENMT:  No difficulty hearing, tinnitus, vertigo; No sinus or throat pain  NECK: No pain or stiffness  CARDIOVASCULAR: See hPI  RESPIRATORY: No Dyspnea on exertion, Shortness of breath, cough, wheezing  : No dysuria, no hematuria   GI: No dark color stool, no melena, no diarrhea, no constipation, no abdominal pain   NEURO: No headache, no dizziness, no slurred speech   MUSCULOSKELETAL: No joint pain or swelling; No muscle, back, or extremity pain  PSYCH: No agitation, no anxiety.    ALL OTHER REVIEW OF SYSTEMS ARE NEGATIVE.      PREVIOUS DIAGNOSTIC TESTING  ECHO FINDINGS: 9/2019 as per outpatient chart- mitral annular calcification, minimal MR, thickened AV leaflets, borderline concentric LVH, normal LV sys. fx. EF 59%, no segmental wall motion abnormalities, minimal TR    ALLERGIES: Allergies    adhesives (Rash)  latex (Rash)  No Known Drug Allergies  wool- rash, itch (Other)    Intolerances          PAST MEDICAL HISTORY  Other fecal abnormalities  Narrow angle glaucoma of left eye  CKD (chronic kidney disease)  Insomnia  Edema of lower extremity  Vitamin D deficiency  Morbid Obesity  Cataract  Dyslipidemia  Deep Vein Thrombosis (DVT)  Spinal Stenosis, Lumbar  Cervical Stenosis of Spine  Endometrial Hyperplasia  HTN (Hypertension)  Diabetes Mellitus Type II      PAST SURGICAL HISTORY  S/P cholecystectomy  S/P appendectomy  S/P total abdominal hysterectomy and bilateral salpingo-oophorectomy  H/O colonoscopy  H/O laser iridotomy  Endometrial biopsy 12/02/09  Tonsillectomy as a child  Cervical Spinal Stenosis surgery x2 (01/2002, 7/2002)  D&C 2008  D&C x2 1980's  Cholecystectomy/appendectomy @ age 25  C Section 1994  Cataract extracted with lens implant 1998      FAMILY HISTORY:  Family history of lung cancer (Grandparent)  Family history of bladder cancer  Family history of pancreatic cancer      SOCIAL HISTORY:    CIGARETTES:   Denies  ALCOHOL:Socially  DRUGS: Denies      CURRENT MEDICATIONS:  furosemide   Injectable 40 milliGRAM(s) IV Push daily  budesonide 160 MICROgram(s)/formoterol 4.5 MICROgram(s) Inhaler  oxyCODONE  ER Tablet  polyethylene glycol 3350  amoxicillin  875 milliGRAM(s)/clavulanate  aspirin enteric coated  atorvastatin  cholecalciferol  collagenase Ointment  dextrose 5%.  dextrose 50% Injectable  dextrose 50% Injectable  dextrose 50% Injectable  heparin   Injectable  insulin glargine Injectable (LANTUS)  insulin lispro (HumaLOG) corrective regimen sliding scale  insulin lispro (HumaLOG) corrective regimen sliding scale  insulin lispro Injectable (HumaLOG)        HOME MEDICATIONS:    aspirin 81 mg oral tablet: 1 tab(s) orally once a day, prophylaxis (26 Jul 2020 06:08)  Augmentin 875 mg-125 mg oral tablet: 1 tab(s) orally every 12 hours   End Date: 7/27/2020 (26 Jul 2020 06:08)  Lasix 40 mg oral tablet: 1 tab(s) orally once a day (26 Jul 2020 06:08)  NovoLOG 100 units/mL subcutaneous solution: 5 unit(s) subcutaneous 3 times a day (before meals) (26 Jul 2020 06:08)  rosuvastatin 20 mg oral tablet: 1 tab(s) orally once a day (26 Jul 2020 06:08)  Vitamin D3 1000 intl units oral capsule: 1 cap(s) orally once a day (26 Jul 2020 06:08)      Vital Signs Last 24 Hrs  T(C): 36.9 (26 Jul 2020 07:26), Max: 36.9 (25 Jul 2020 21:25)  T(F): 98.5 (26 Jul 2020 07:26), Max: 98.5 (26 Jul 2020 07:26)  HR: 75 (26 Jul 2020 09:00) (73 - 91)  BP: 111/52 (26 Jul 2020 07:26) (111/52 - 159/79)  RR: 20 (26 Jul 2020 07:26) (20 - 20)  SpO2: 95% (26 Jul 2020 09:00) (87% - 99%)      PHYSICAL EXAM:  Constitutional: Comfortable . No acute distress.   HEENT: Atraumatic and normocephalic , neck is supple . no JVD. No carotid bruit. PEERL   CNS: A&Ox3. No focal deficits. EOMI.   Lymph Nodes: Cervical : Not palpable.  Respiratory: exp. wheezing bilaterally, SOB when verbalizing  Cardiovascular: S1S2 RRR. No murmur/rubs or gallop.  Gastrointestinal: Soft non-tender and non distended . +Bowel sounds. negative Maya's sign.  Extremities: +4/+4 bilateral lower extremity edema.   Psychiatric: Calm . no agitation.  Skin: RLE heel wound covered with DSD, LLE scaling, dusky appearance of bilateral toes    Intake and output:     LABS:                        13.2   10.61 )-----------( 266      ( 25 Jul 2020 23:29 )             42.4     07-26    140  |  99  |  43.0<H>  ----------------------------<  175<H>  4.7   |  28.0  |  2.41<H>    Ca    9.4      26 Jul 2020 07:52  Mg     2.2     07-25    TPro  7.1  /  Alb  3.5  /  TBili  0.4  /  DBili  x   /  AST  22  /  ALT  24  /  AlkPhos  154<H>  07-25    CARDIAC MARKERS ( 26 Jul 2020 07:52 )  x     / <0.01 ng/mL / 100 U/L / x     / x      CARDIAC MARKERS ( 25 Jul 2020 23:29 )  x     / 0.01 ng/mL / x     / x     / x        ;p-BNP=Serum Pro-Brain Natriuretic Peptide: 2235 pg/mL (07-25 @ 23:29)    PT/INR - ( 25 Jul 2020 23:29 )   PT: 11.8 sec;   INR: 1.02 ratio         PTT - ( 25 Jul 2020 23:29 )  PTT:33.0 sec      INTERPRETATION OF TELEMETRY: No tele  ECG: NSR HR @ 75, T wave abnormalities in lateral leads    RADIOLOGY & ADDITIONAL STUDIES:    X-ray:  < from: Xray Chest 1 View AP/PA. (07.25.20 @ 22:54) >  FINDINGS:    Lungs: There are no lung consolidations.  Heart: The heart is top normal in size.  Mediastinum: The mediastinum is within normal limits.    IMPRESSION:    No lung consolidations.    < end of copied text > [facility]                                                       [facility]                                                               Office:  [address] [address]                                                              Telephone: [phone]. Fax:[phone]                                                                        CARDIOLOGY CONSULTATION NOTE                                                                                             Consult requested by:  Dr. Morales  Reason for Consultation: Chest Pain  History obtained by: Patient and medical record   obtained: No    Chief complaint:    Patient is a 65y old  Female who presents with a chief complaint of acute hypoxemic respiratory failure, acute decompensated heart failure (26 Jul 2020 04:36)        HPI:  65yoF hx morbid obesity, chronic pain syndrome from spinal stenosis on opioids, HTN, DM complicated by neuropathy and charcot foot, CKD, chronic leg edema due to venous stasis, discharged from [facility] 2 days ago for sepsis due to right foot wound infection presenting with progressive dyspnea and episodes of hypoxia at home. Pt reports having chronic dyspnea on exertion and at rest for several months, but it has been worsening since she was discharged from the hospital 2 days ago. She checks her pulse ox at home and has noticed readings in the 80s. Her baseline leg swelling is also worse than usual.  She has difficulty laying flat due to her spinal stenosis.  She also has been having intermittent, non-radiating left sided chest pain which reports as cramping in nature, lasting a few minutes in duration and not associated with exertion or rest.  Reports that she was recently diagnosed with asthma by her pulmonologist, placed on maintenance inhalers which she uses but still having persistent symptoms. She was advised to take a sleep study (per outpatient records due to suspect OHS) which has been on hold due to COVID pandemic.  Pt reports she had a nuclear stress this several months ago by cardiologist and was told that the study was abnormal, however did not undergo a cardiac cath.  She denies known hx of CHF and reports being on diuretic for leg swelling due to venous stasis.   On admission, pt with worsening proBNP (2235) compared with 510 few days ago and CXR on my read showing increased interstitial markings. (26 Jul 2020 04:36)  Above appreciated  Pt states that she has recently been admitted to [facility] for treatment for septic d/t wound in right foot. Prior to this hospital admission, patient states she has been experiencing  chest pain over the past three months that has been increasing in severity. Chest Pain is described as intermittent, left sided, non radiating 7/10, cramping, no pain modifying factors noted, with associated SOB. Pt had o2 sat checked at home which was in the 80s. Pt does not have home o2. In ED, Tropx2-negative, BNP-2235, Xray- no lung consolidation, ECG-NSR HR @ 75, T wave abnormalities in lateral leads. Pt currently presents with SOB while on 3L NC. Pt also c/o  baseline back pain. Upon assessment, pt has exp. wheezing and limited air movement, bilateral lower extremity edema with dusky appearance of toes.               REVIEW OF SYMPTOMS:     CONSTITUTIONAL: No fever, weight loss, or fatigue  ENMT:  No difficulty hearing, tinnitus, vertigo; No sinus or throat pain  NECK: No pain or stiffness  CARDIOVASCULAR: See hPI  RESPIRATORY: No Dyspnea on exertion, Shortness of breath, cough, wheezing  : No dysuria, no hematuria   GI: No dark color stool, no melena, no diarrhea, no constipation, no abdominal pain   NEURO: No headache, no dizziness, no slurred speech   MUSCULOSKELETAL: No joint pain or swelling; No muscle, back, or extremity pain  PSYCH: No agitation, no anxiety.    ALL OTHER REVIEW OF SYSTEMS ARE NEGATIVE.      PREVIOUS DIAGNOSTIC TESTING  ECHO FINDINGS: 9/2019 as per outpatient chart- mitral annular calcification, minimal MR, thickened AV leaflets, borderline concentric LVH, normal LV sys. fx. EF 59%, no segmental wall motion abnormalities, minimal TR    ALLERGIES: Allergies    adhesives (Rash)  latex (Rash)  No Known Drug Allergies  wool- rash, itch (Other)      PAST MEDICAL HISTORY  Other fecal abnormalities  Narrow angle glaucoma of left eye  CKD (chronic kidney disease)  Insomnia  Edema of lower extremity  Vitamin D deficiency  Morbid Obesity  Cataract  Dyslipidemia  Deep Vein Thrombosis (DVT)  Spinal Stenosis, Lumbar  Cervical Stenosis of Spine  Endometrial Hyperplasia  HTN (Hypertension)  Diabetes Mellitus Type II    PAST SURGICAL HISTORY  S/P cholecystectomy  S/P appendectomy  S/P total abdominal hysterectomy and bilateral salpingo-oophorectomy  H/O colonoscopy  H/O laser iridotomy  Endometrial biopsy 12/02/09  Tonsillectomy as a child  Cervical Spinal Stenosis surgery x2 (01/2002, 7/2002)  D&C 2008  D&C x2 1980's  Cholecystectomy/appendectomy @ age 25  C Section 1994  Cataract extracted with lens implant 1998    FAMILY HISTORY:  Family history of lung cancer (Grandparent)  Family history of bladder cancer  Family history of pancreatic cancer    SOCIAL HISTORY:    CIGARETTES:   Denies  ALCOHOL:Socially  DRUGS: Denies    CURRENT MEDICATIONS:  furosemide   Injectable 40 milliGRAM(s) IV Push daily  budesonide 160 MICROgram(s)/formoterol 4.5 MICROgram(s) Inhaler  oxyCODONE  ER Tablet  polyethylene glycol 3350  amoxicillin  875 milliGRAM(s)/clavulanate  aspirin enteric coated  atorvastatin  cholecalciferol  collagenase Ointment  dextrose 5%.  dextrose 50% Injectable  dextrose 50% Injectable  dextrose 50% Injectable  heparin   Injectable  insulin glargine Injectable (LANTUS)  insulin lispro (HumaLOG) corrective regimen sliding scale  insulin lispro (HumaLOG) corrective regimen sliding scale  insulin lispro Injectable (HumaLOG)      HOME MEDICATIONS:  aspirin 81 mg oral tablet: 1 tab(s) orally once a day, prophylaxis (26 Jul 2020 06:08)  Augmentin 875 mg-125 mg oral tablet: 1 tab(s) orally every 12 hours   End Date: 7/27/2020 (26 Jul 2020 06:08)  Lasix 40 mg oral tablet: 1 tab(s) orally once a day (26 Jul 2020 06:08)  NovoLOG 100 units/mL subcutaneous solution: 5 unit(s) subcutaneous 3 times a day (before meals) (26 Jul 2020 06:08)  rosuvastatin 20 mg oral tablet: 1 tab(s) orally once a day (26 Jul 2020 06:08)  Vitamin D3 1000 intl units oral capsule: 1 cap(s) orally once a day (26 Jul 2020 06:08)    Vital Signs Last 24 Hrs  T(C): 36.9 (26 Jul 2020 07:26), Max: 36.9 (25 Jul 2020 21:25)  T(F): 98.5 (26 Jul 2020 07:26), Max: 98.5 (26 Jul 2020 07:26)  HR: 75 (26 Jul 2020 09:00) (73 - 91)  BP: 111/52 (26 Jul 2020 07:26) (111/52 - 159/79)  RR: 20 (26 Jul 2020 07:26) (20 - 20)  SpO2: 95% (26 Jul 2020 09:00) (87% - 99%)    PHYSICAL EXAM:  Constitutional: Comfortable . No acute distress.   HEENT: Atraumatic and normocephalic , neck is supple . no JVD. No carotid bruit. PEERL   CNS: A&Ox3. No focal deficits. EOMI.   Lymph Nodes: Cervical : Not palpable.  Respiratory: exp. wheezing bilaterally, SOB when verbalizing  Cardiovascular: S1S2 RRR. No murmur/rubs or gallop.  Gastrointestinal: Soft non-tender and non distended . +Bowel sounds. negative Maya's sign.  Extremities: +4/+4 bilateral lower extremity edema.   Psychiatric: Calm . no agitation.  Skin: RLE heel wound covered with DSD, LLE scaling, dusky appearance of bilateral toes    Intake and output:     LABS:                        13.2   10.61 )-----------( 266      ( 25 Jul 2020 23:29 )             42.4     07-26    140  |  99  |  43.0<H>  ----------------------------<  175<H>  4.7   |  28.0  |  2.41<H>    Ca    9.4      26 Jul 2020 07:52  Mg     2.2     07-25    TPro  7.1  /  Alb  3.5  /  TBili  0.4  /  DBili  x   /  AST  22  /  ALT  24  /  AlkPhos  154<H>  07-25    CARDIAC MARKERS ( 26 Jul 2020 07:52 )  x     / <0.01 ng/mL / 100 U/L / x     / x      CARDIAC MARKERS ( 25 Jul 2020 23:29 )  x     / 0.01 ng/mL / x     / x     / x        ;p-BNP=Serum Pro-Brain Natriuretic Peptide: 2235 pg/mL (07-25 @ 23:29)    PT/INR - ( 25 Jul 2020 23:29 )   PT: 11.8 sec;   INR: 1.02 ratio         PTT - ( 25 Jul 2020 23:29 )  PTT:33.0 sec      INTERPRETATION OF TELEMETRY: No tele  ECG: NSR HR @ 75, T wave abnormalities in lateral leads    RADIOLOGY & ADDITIONAL STUDIES:    X-ray:  < from: Xray Chest 1 View AP/PA. (07.25.20 @ 22:54) >  FINDINGS:    Lungs: There are no lung consolidations.  Heart: The heart is top normal in size.  Mediastinum: The mediastinum is within normal limits.    IMPRESSION:    No lung consolidations.    < end of copied text >

## 2020-07-26 NOTE — ED ADULT NURSE NOTE - OBJECTIVE STATEMENT
pt presents c/o SOB for one day. pt recently dc'ed from [place] s/t sepsis. pt sent home without o2, although sats were low. NAD noted, respirations even and nonlabored. pt placed on nc @ 3L and put on spo2.

## 2020-07-26 NOTE — PROGRESS NOTE ADULT - ASSESSMENT
65yoF hx morbid obesity, chronic pain syndrome from spinal stenosis on opioids, venous stasis with chronic leg edema, HTN, DM complicated by neuropathy and charcot foot, CKD, discharged from [facility] 2 days ago for sepsis due to right foot wound infection/cellulitis, presenting with worsening dyspnea and baseline leg swelling with episodes of hypoxia at home and hypoxic in 80s on admission, also with episodes of intermittent chest pain and pt reporting recent abnormal outpatient stress test    Acute hypoxemic respiratory failure due to new onset acute decompensated CHF  -Clinical hx suggestive of CHF, also with worsening proBNP and recent chest pain concerning for ischemia   -Trend cardiac enzymes and EKG  -Received IV Lasix 20mg in ED, will start IV Lasix 40mg daily for now  -TTE ordered  -Cardiology consulted    R-foot wound infection/cellulitis   -Pt recently hospitalized at [facility] for above and discharged on Augmentin   -Augment resumed, end date is 7/27/2020  -Continue with collagenase   -Wound care consult for dressing changes  Leg dopplers    Chronic pain syndrome from spinal stenosis  -On OxyContin 80mg BID and hydrocodone/acetaminophen 7.5/325mg PRN and was iSTOP’ed during last hospitalization (chart note reflecting this on 7/20/2020)  -OxyContin 80mg resumed Percocet PRN   -Bowel regimen with daily miralax    DM with neuropathy   -Previously on aggressive insulin regimen with NPH 76U/70U, but had hypoglycemia with FS <50 during recent hospitalization and discharged on lantus 20U and novolog 5U TID   Insulin lantus   -Insulin sliding scale  -    CKD  -Admission Cr 2.34, baseline ~1.8, does not meet criteria for JACQUES  -Being diuresed for CHF exacerbation as above, monitor renal function closely     Asthma  -Symbicort and albuterol PRN resumed     HLD  -Statin resumed

## 2020-07-26 NOTE — H&P ADULT - ASSESSMENT
65yoF hx morbid obesity, chronic pain syndrome from spinal stenosis on opioids, venous stasis with chronic leg edema, HTN, DM complicated by neuropathy and charcot foot, CKD, discharged from [facility] 2 days ago for sepsis due to right wound infection, discharged on PO abx, presenting with worsening dyspnea and baseline leg swelling with episodes of hypoxia at home and hypoxic in 80s on admission, also with episodes of intermittent chest pain and pt reported abnormal outpatient stress test    Acute hypoxemic respiratory failure due to acute decompensated CHF  -Clinical hx suggestive of CHF, also with worsening proBNP and recent chest pain concerning for ischemia   -Troponin negative x 1, EKG not done, advised ED staff to do, please follow up  -Admit to telemetry  -Trend cardiac enzymes and EKG  -Received IV Lasix 20mg in ED, will start IV Lasix 40mg daily for now  -TTE ordered  -Cardiology consulted ([facility])    R-foot wound infection/cellulitis   -Pt recently hospitalized at [facility] for above and discharged on Augmentin   -Augment resumed, end date is 7/27/2020  -Continue with collagenase   -Wound care consult for dressing changes    Chronic pain syndrome from spinal stenosis  -On OxyContin 80mg BID and hydrocodone/acetaminophen 7.5/325mg PRN and was iSTOP’ed during last hospitalization (chart note reflecting this on 7/20/2020)  -OxyContin 80mg resumed  -Vicodin not available, started Percocet PRN   -Bowel regimen with daily miralax    DM with neuropathy   -Previously on aggressive insulin regimen with NPH 76U/70U, but had hypoglycemia with FS <50 during recent hospitalization and discharged on lantus 20U and novolog 5U TID   -Admission glucose 200, since stable on above while in hospital will resume lantus 20U, Humalog 5U TID  -Insulin sliding scale  -Consistent carbohydrate diet    CKD  -Admission Cr 2.34, baseline ~1.8, does not meet criteria for JACQUES  -Being diuresed for CHF exacerbation as above, monitor renal function closely     Asthma  -Symbicort and albuterol PRN resumed     HLD  -Statin resumed    Prophylactic measure  -Heparin 65yoF hx morbid obesity, chronic pain syndrome from spinal stenosis on opioids, venous stasis with chronic leg edema, HTN, DM complicated by neuropathy and charcot foot, CKD, discharged from [facility] 2 days ago for sepsis due to right foot wound infection/cellulitis, presenting with worsening dyspnea and baseline leg swelling with episodes of hypoxia at home and hypoxic in 80s on admission, also with episodes of intermittent chest pain and pt reporting recent abnormal outpatient stress test    Acute hypoxemic respiratory failure due to new onset acute decompensated CHF  -Clinical hx suggestive of CHF, also with worsening proBNP and recent chest pain concerning for ischemia   -Troponin negative x 1, EKG not done, advised ED staff to do, please follow up  -Admit to telemetry  -Trend cardiac enzymes and EKG  -Received IV Lasix 20mg in ED, will start IV Lasix 40mg daily for now  -TTE ordered  -Cardiology consulted ([facility])    R-foot wound infection/cellulitis   -Pt recently hospitalized at [facility] for above and discharged on Augmentin   -Augment resumed, end date is 7/27/2020  -Continue with collagenase   -Wound care consult for dressing changes    Chronic pain syndrome from spinal stenosis  -On OxyContin 80mg BID and hydrocodone/acetaminophen 7.5/325mg PRN and was iSTOP’ed during last hospitalization (chart note reflecting this on 7/20/2020)  -OxyContin 80mg resumed  -Vicodin not available, started Percocet PRN   -Bowel regimen with daily miralax    DM with neuropathy   -Previously on aggressive insulin regimen with NPH 76U/70U, but had hypoglycemia with FS <50 during recent hospitalization and discharged on lantus 20U and novolog 5U TID   -Admission glucose 200, since stable on above while in hospital will resume lantus 20U, Humalog 5U TID  -Insulin sliding scale  -Consistent carbohydrate diet    CKD  -Admission Cr 2.34, baseline ~1.8, does not meet criteria for JACQUES  -Being diuresed for CHF exacerbation as above, monitor renal function closely     Asthma  -Symbicort and albuterol PRN resumed     HLD  -Statin resumed    Prophylactic measure  -Heparin

## 2020-07-26 NOTE — PROGRESS NOTE ADULT - SUBJECTIVE AND OBJECTIVE BOX
CC: SOB , leg edema.   HPI:  65yoF hx morbid obesity, chronic pain syndrome from spinal stenosis on opioids, HTN, DM complicated by neuropathy and charcot foot, CKD, chronic leg edema due to venous stasis, discharged from [facility] 2 days ago for sepsis due to right foot wound infection presenting with progressive dyspnea and episodes of hypoxia at home. Pt reports having chronic dyspnea on exertion and at rest for several months, but it has been worsening since she was discharged from the hospital 2 days ago. She checks her pulse ox at home and has noticed readings in the 80s. Her baseline leg swelling is also worse than usual.  She has difficulty laying flat due to her spinal stenosis.  She also has been having intermittent, non-radiating left sided chest pain which reports as cramping in nature, lasting a few minutes in duration and not associated with exertion or rest.  Reports that she was recently diagnosed with asthma by her pulmonologist, placed on maintenance inhalers which she uses but still having persistent symptoms. She was advised to take a sleep study (per outpatient records due to suspect OHS) which has been on hold due to COVID pandemic.  Pt reports she had a nuclear stress this several months ago by cardiologist and was told that the study was abnormal, however did not undergo a cardiac cath.  She denies known hx of CHF and reports being on diuretic for leg swelling due to venous stasis.     On admission, pt with worsening proBNP (2235) compared with 510 few days ago and CXR on my read showing increased interstitial markings. (26 Jul 2020 04:36)    REVIEW OF SYSTEMS:    Patient denied fever, chills, abdominal pain, nausea, vomiting, cough, shortness of breath, chest pain or palpitations    Vital Signs Last 24 Hrs  T(C): 36.9 (26 Jul 2020 07:26), Max: 36.9 (25 Jul 2020 21:25)  T(F): 98.5 (26 Jul 2020 07:26), Max: 98.5 (26 Jul 2020 07:26)  HR: 75 (26 Jul 2020 09:00) (73 - 91)  BP: 111/52 (26 Jul 2020 07:26) (111/52 - 159/79)  BP(mean): --  RR: 20 (26 Jul 2020 07:26) (20 - 20)  SpO2: 95% (26 Jul 2020 09:00) (87% - 99%)I&O's Summary    PHYSICAL EXAM:  GENERAL: NAD, Extreme obese   HEENT: PERRL, +EOMI, anicteric, no [place]  NECK: Supple, No JVD   CHEST/LUNG: CTA bilaterally; Normal effort  HEART: S1S2 Normal intensity, no murmurs, gallops or rubs noted  ABDOMEN: Soft, BS Normoactive, NT, ND, no HSM noted  EXTREMITIES:  2+ radial and DP pulses noted, no clubbing, cyanosis, or edema noted, Limited mobility.   SKIN: Cellulitis left lower ext. Right foot ulcer   NEURO: A&Ox3, no focal deficits noted, CN II-XII intact  PSYCH: Depressed mood and affect; insight/judgement appropriate  LABS:                        13.2   10.61 )-----------( 266      ( 25 Jul 2020 23:29 )             42.4     07-26    140  |  99  |  43.0<H>  ----------------------------<  175<H>  4.7   |  28.0  |  2.41<H>    Ca    9.4      26 Jul 2020 07:52  Mg     2.2     07-25    TPro  7.1  /  Alb  3.5  /  TBili  0.4  /  DBili  x   /  AST  22  /  ALT  24  /  AlkPhos  154<H>  07-25    PT/INR - ( 25 Jul 2020 23:29 )   PT: 11.8 sec;   INR: 1.02 ratio         PTT - ( 25 Jul 2020 23:29 )  PTT:33.0 sec    RADIOLOGY & ADDITIONAL TESTS:    MEDICATIONS:  MEDICATIONS  (STANDING):  amoxicillin  875 milliGRAM(s)/clavulanate 1 Tablet(s) Oral every 12 hours  aspirin enteric coated 81 milliGRAM(s) Oral daily  atorvastatin 80 milliGRAM(s) Oral at bedtime  budesonide 160 MICROgram(s)/formoterol 4.5 MICROgram(s) Inhaler 2 Puff(s) Inhalation two times a day  cholecalciferol 1000 Unit(s) Oral daily  collagenase Ointment 1 Application(s) Topical daily  dextrose 5%. 1000 milliLiter(s) (50 mL/Hr) IV Continuous <Continuous>  dextrose 50% Injectable 12.5 Gram(s) IV Push once  dextrose 50% Injectable 25 Gram(s) IV Push once  dextrose 50% Injectable 25 Gram(s) IV Push once  furosemide   Injectable 40 milliGRAM(s) IV Push daily  heparin   Injectable 7500 Unit(s) SubCutaneous every 8 hours  insulin glargine Injectable (LANTUS) 20 Unit(s) SubCutaneous at bedtime  insulin lispro (HumaLOG) corrective regimen sliding scale   SubCutaneous three times a day before meals  insulin lispro (HumaLOG) corrective regimen sliding scale   SubCutaneous at bedtime  insulin lispro Injectable (HumaLOG) 5 Unit(s) SubCutaneous three times a day before meals  oxyCODONE  ER Tablet 80 milliGRAM(s) Oral every 12 hours  polyethylene glycol 3350 17 Gram(s) Oral daily    MEDICATIONS  (PRN):  ALBUTerol    90 MICROgram(s) HFA Inhaler 2 Puff(s) Inhalation every 6 hours PRN Shortness of Breath and/or Wheezing  clonazePAM  Tablet 0.5 milliGRAM(s) Oral daily PRN Anxiety  dextrose 40% Gel 15 Gram(s) Oral once PRN Blood Glucose LESS THAN 70 milliGRAM(s)/deciliter  glucagon  Injectable 1 milliGRAM(s) IntraMuscular once PRN Glucose LESS THAN 70 milligrams/deciliter  oxycodone    5 mG/acetaminophen 325 mG 1 Tablet(s) Oral every 6 hours PRN Moderate to Severe Pain

## 2020-07-27 LAB
ALBUMIN SERPL ELPH-MCNC: 3.4 G/DL — SIGNIFICANT CHANGE UP (ref 3.3–5.2)
ALP SERPL-CCNC: 133 U/L — HIGH (ref 40–120)
ALT FLD-CCNC: 18 U/L — SIGNIFICANT CHANGE UP
ANION GAP SERPL CALC-SCNC: 14 MMOL/L — SIGNIFICANT CHANGE UP (ref 5–17)
AST SERPL-CCNC: 14 U/L — SIGNIFICANT CHANGE UP
BACTERIA UR CULT: NORMAL
BILIRUB SERPL-MCNC: 0.4 MG/DL — SIGNIFICANT CHANGE UP (ref 0.4–2)
BUN SERPL-MCNC: 45 MG/DL — HIGH (ref 8–20)
CALCIUM SERPL-MCNC: 9 MG/DL — SIGNIFICANT CHANGE UP (ref 8.6–10.2)
CHLORIDE SERPL-SCNC: 98 MMOL/L — SIGNIFICANT CHANGE UP (ref 98–107)
CO2 SERPL-SCNC: 26 MMOL/L — SIGNIFICANT CHANGE UP (ref 22–29)
CREAT SERPL-MCNC: 1.87 MG/DL — HIGH (ref 0.5–1.3)
GLUCOSE BLDC GLUCOMTR-MCNC: 164 MG/DL — HIGH (ref 70–99)
GLUCOSE BLDC GLUCOMTR-MCNC: 203 MG/DL — HIGH (ref 70–99)
GLUCOSE BLDC GLUCOMTR-MCNC: 209 MG/DL — HIGH (ref 70–99)
GLUCOSE BLDC GLUCOMTR-MCNC: 229 MG/DL — HIGH (ref 70–99)
GLUCOSE SERPL-MCNC: 196 MG/DL — HIGH (ref 70–99)
HCT VFR BLD CALC: 41.2 % — SIGNIFICANT CHANGE UP (ref 34.5–45)
HGB BLD-MCNC: 12.6 G/DL — SIGNIFICANT CHANGE UP (ref 11.5–15.5)
MCHC RBC-ENTMCNC: 29.1 PG — SIGNIFICANT CHANGE UP (ref 27–34)
MCHC RBC-ENTMCNC: 30.6 GM/DL — LOW (ref 32–36)
MCV RBC AUTO: 95.2 FL — SIGNIFICANT CHANGE UP (ref 80–100)
PLATELET # BLD AUTO: 265 K/UL — SIGNIFICANT CHANGE UP (ref 150–400)
POTASSIUM SERPL-MCNC: 4.7 MMOL/L — SIGNIFICANT CHANGE UP (ref 3.5–5.3)
POTASSIUM SERPL-SCNC: 4.7 MMOL/L — SIGNIFICANT CHANGE UP (ref 3.5–5.3)
PROT SERPL-MCNC: 6.8 G/DL — SIGNIFICANT CHANGE UP (ref 6.6–8.7)
RBC # BLD: 4.33 M/UL — SIGNIFICANT CHANGE UP (ref 3.8–5.2)
RBC # FLD: 14.1 % — SIGNIFICANT CHANGE UP (ref 10.3–14.5)
SARS-COV-2 RNA SPEC QL NAA+PROBE: SIGNIFICANT CHANGE UP
SODIUM SERPL-SCNC: 138 MMOL/L — SIGNIFICANT CHANGE UP (ref 135–145)
WBC # BLD: 8.21 K/UL — SIGNIFICANT CHANGE UP (ref 3.8–10.5)
WBC # FLD AUTO: 8.21 K/UL — SIGNIFICANT CHANGE UP (ref 3.8–10.5)

## 2020-07-27 PROCEDURE — 99232 SBSQ HOSP IP/OBS MODERATE 35: CPT

## 2020-07-27 RX ADMIN — Medication 5 UNIT(S): at 11:57

## 2020-07-27 RX ADMIN — Medication 2: at 16:49

## 2020-07-27 RX ADMIN — Medication 1: at 08:20

## 2020-07-27 RX ADMIN — OXYCODONE HYDROCHLORIDE 80 MILLIGRAM(S): 5 TABLET ORAL at 06:46

## 2020-07-27 RX ADMIN — OXYCODONE HYDROCHLORIDE 80 MILLIGRAM(S): 5 TABLET ORAL at 18:45

## 2020-07-27 RX ADMIN — BUDESONIDE AND FORMOTEROL FUMARATE DIHYDRATE 2 PUFF(S): 160; 4.5 AEROSOL RESPIRATORY (INHALATION) at 08:46

## 2020-07-27 RX ADMIN — Medication 1000 UNIT(S): at 11:55

## 2020-07-27 RX ADMIN — Medication 81 MILLIGRAM(S): at 11:55

## 2020-07-27 RX ADMIN — ATORVASTATIN CALCIUM 80 MILLIGRAM(S): 80 TABLET, FILM COATED ORAL at 23:39

## 2020-07-27 RX ADMIN — Medication 0.5 MILLIGRAM(S): at 03:00

## 2020-07-27 RX ADMIN — POLYETHYLENE GLYCOL 3350 17 GRAM(S): 17 POWDER, FOR SOLUTION ORAL at 12:31

## 2020-07-27 RX ADMIN — Medication 40 MILLIGRAM(S): at 06:46

## 2020-07-27 RX ADMIN — Medication 1 TABLET(S): at 06:45

## 2020-07-27 RX ADMIN — Medication 2: at 11:57

## 2020-07-27 RX ADMIN — Medication 5 UNIT(S): at 08:20

## 2020-07-27 RX ADMIN — BUDESONIDE AND FORMOTEROL FUMARATE DIHYDRATE 2 PUFF(S): 160; 4.5 AEROSOL RESPIRATORY (INHALATION) at 20:37

## 2020-07-27 RX ADMIN — OXYCODONE AND ACETAMINOPHEN 1 TABLET(S): 5; 325 TABLET ORAL at 02:56

## 2020-07-27 RX ADMIN — HEPARIN SODIUM 7500 UNIT(S): 5000 INJECTION INTRAVENOUS; SUBCUTANEOUS at 23:31

## 2020-07-27 RX ADMIN — Medication 0.5 MILLIGRAM(S): at 23:28

## 2020-07-27 RX ADMIN — Medication 1 APPLICATION(S): at 23:29

## 2020-07-27 RX ADMIN — Medication 1 TABLET(S): at 23:29

## 2020-07-27 RX ADMIN — HEPARIN SODIUM 7500 UNIT(S): 5000 INJECTION INTRAVENOUS; SUBCUTANEOUS at 16:30

## 2020-07-27 RX ADMIN — OXYCODONE AND ACETAMINOPHEN 1 TABLET(S): 5; 325 TABLET ORAL at 03:50

## 2020-07-27 RX ADMIN — OXYCODONE AND ACETAMINOPHEN 1 TABLET(S): 5; 325 TABLET ORAL at 16:47

## 2020-07-27 RX ADMIN — INSULIN GLARGINE 20 UNIT(S): 100 INJECTION, SOLUTION SUBCUTANEOUS at 23:32

## 2020-07-27 RX ADMIN — Medication 1 APPLICATION(S): at 11:58

## 2020-07-27 RX ADMIN — HEPARIN SODIUM 7500 UNIT(S): 5000 INJECTION INTRAVENOUS; SUBCUTANEOUS at 06:50

## 2020-07-27 RX ADMIN — Medication 5 UNIT(S): at 16:49

## 2020-07-27 NOTE — PROGRESS NOTE ADULT - ASSESSMENT
65yoF hx morbid obesity, chronic pain syndrome from spinal stenosis on opioids, venous stasis with chronic leg edema, HTN, DM complicated by neuropathy and charcot foot, CKD, discharged from [facility] 2 days ago for sepsis due to right foot wound infection/cellulitis, presenting with worsening dyspnea and baseline leg swelling with episodes of hypoxia at home and hypoxic in 80s on admission, also with episodes of intermittent chest pain and pt reporting recent abnormal outpatient stress test    Acute hypoxemic respiratory failure due to new onset acute decompensated CHF  -Clinical hx suggestive of CHF, also with worsening proBNP and recent chest pain concerning for ischemia   -Trend cardiac enzymes and EKG  -Received IV Lasix 20mg in ED, will start IV Lasix 40mg daily for now  -TTE ordered  -Cardiology consulted    R-foot wound infection/cellulitis   -Pt recently hospitalized at [facility] for above and discharged on Augmentin   -Augment resumed, end date is 7/27/2020  -Continue with collagenase   -Wound care consult for dressing changes  Leg dopplers    Chronic pain syndrome from spinal stenosis  -On OxyContin 80mg BID and hydrocodone/acetaminophen 7.5/325mg PRN and was iSTOP’ed during last hospitalization (chart note reflecting this on 7/20/2020)  -OxyContin 80mg resumed Percocet PRN   -Bowel regimen with daily miralax    DM with neuropathy   -Previously on aggressive insulin regimen with NPH 76U/70U, but had hypoglycemia with FS <50 during recent hospitalization and discharged on lantus 20U and novolog 5U TID   Insulin lantus   -Insulin sliding scale  -    CKD  -Admission Cr 2.34, baseline ~1.8, does not meet criteria for JACQUES  -Being diuresed for CHF exacerbation as above, monitor renal function closely     Asthma  -Symbicort and albuterol PRN resumed     HLD  -Statin resumed 65yoF hx morbid obesity, chronic pain syndrome from spinal stenosis on opioids, venous stasis with chronic leg edema, HTN, DM complicated by neuropathy and charcot foot, CKD, discharged from [facility] 2 days ago for sepsis due to right foot wound infection/cellulitis, presenting with worsening dyspnea and baseline leg swelling with episodes of hypoxia at home and hypoxic in 80s on admission, also with episodes of intermittent chest pain and pt reporting recent abnormal outpatient stress test    Acute hypoxemic respiratory failure due to new onset acute decompensated CHF  -Clinical hx suggestive of CHF, also with worsening proBNP and recent chest pain concerning for ischemia   - IV Lasix 40mg daily for now  -TTE ordered  -Cardiology consulted, plan for nuclear stress test tomorrow    R-foot wound infection/cellulitis   -Pt recently hospitalized at [facility] for above and discharged on Augmentin   -Augmentin end date is 7/27/2020  -Continue with collagenase QD  -Wound care consult for dressing changes  Leg dopplers    Chronic pain syndrome from spinal stenosis  -On OxyContin 80mg BID and hydrocodone/acetaminophen 7.5/325mg PRN and was iSTOP’ed during last hospitalization (chart note reflecting this on 7/20/2020)  -OxyContin 80mg resumed Percocet PRN   -Bowel regimen with daily miralax    DM with neuropathy   -Previously on aggressive insulin regimen with NPH 76U/70U, but had hypoglycemia with FS <50 during recent hospitalization and discharged on lantus 20U and novolog 5U TID   Insulin lantus   -Insulin sliding scale    CKD  -Admission Cr 2.34, baseline ~1.8, does not meet criteria for JACQUES  -Being diuresed for CHF exacerbation as above, monitor renal function closely     Asthma  -Symbicort and albuterol PRN resumed     HLD  -continue home Statin

## 2020-07-27 NOTE — PROGRESS NOTE ADULT - SUBJECTIVE AND OBJECTIVE BOX
[facility]                                                               Office: [address] [address]                                                              Telephone: [phone]. Fax:[phone]                                                                             PROGRESS NOTE  Reason for follow up: Chest pain   Overnight: No new events.   Update: Patient is currently resting comfortably, denies any worsening chest pain or dyspnea. Patient states she just wants to know that the plan for her right foot is.       Review of symptoms:   Cardiac:  No chest pain. No dyspnea. No palpitations.  Respiratory: No cough. No dyspnea  Gastrointestinal: No diarrhea. No abdominal pain. No bleeding.     Past medical history: No updates.   	  Vitals:  T(C): 36.4 (07-27-20 @ 07:47), Max: 36.9 (07-26-20 @ 15:12)  HR: 76 (07-27-20 @ 08:47) (69 - 76)  BP: 136/78 (07-27-20 @ 07:47) (122/64 - 155/66)  RR: 20 (07-27-20 @ 07:47) (18 - 20)  SpO2: 96% (07-27-20 @ 07:47) (96% - 99%)  Wt(kg): --  I&O's Summary    26 Jul 2020 07:01  -  27 Jul 2020 07:00  --------------------------------------------------------  IN: 0 mL / OUT: 700 mL / NET: -700 mL      Weight (kg): 174.6 (07-25 @ 21:25)    PHYSICAL EXAM:  Constitutional: Comfortable . No acute distress.   HEENT: Atraumatic and normocephalic , neck is supple . no JVD. No carotid bruit. PEERL   CNS: A&Ox3. No focal deficits. EOMI.   Lymph Nodes: Cervical : Not palpable.  Respiratory: Decreased BS bilaterally   Cardiovascular: S1S2 RRR. No murmur/rubs or gallop.  Gastrointestinal: Soft non-tender and non distended . +Bowel sounds. negative Maya's sign.  Extremities: +4/+4 bilateral lower extremity edema.   Psychiatric: Calm . no agitation.  Skin: RLE heel wound covered with DSD, LLE scaling, dusky appearance of bilateral toes        CURRENT MEDICAIONS:  furosemide   Injectable 40 milliGRAM(s) IV Push daily    budesonide 160 MICROgram(s)/formoterol 4.5 MICROgram(s) Inhaler  amoxicillin  875 milliGRAM(s)/clavulanate  oxyCODONE  ER Tablet  polyethylene glycol 3350  atorvastatin  dextrose 50% Injectable  dextrose 50% Injectable  dextrose 50% Injectable  insulin glargine Injectable (LANTUS)  insulin lispro (HumaLOG) corrective regimen sliding scale  insulin lispro (HumaLOG) corrective regimen sliding scale  insulin lispro Injectable (HumaLOG)  aspirin enteric coated  cholecalciferol  collagenase Ointment  dextrose 5%.  heparin   Injectable      DIAGNOSTIC TESTING:  [ ] Echocardiogram: Pending  [ ] Stress Test:  Pending  OTHER: 	      LABS:	 	  CARDIAC MARKERS ( 26 Jul 2020 07:52 )  x     / <0.01 ng/mL / 100 U/L / x     / x      p-BNP 26 Jul 2020 07:52: x    , CARDIAC MARKERS ( 25 Jul 2020 23:29 )  x     / 0.01 ng/mL / x     / x     / x      p-BNP 25 Jul 2020 23:29: 2235 pg/mL                          12.6   8.21  )-----------( 265      ( 27 Jul 2020 05:26 )             41.2     07-27    138  |  98  |  45.0<H>  ----------------------------<  196<H>  4.7   |  26.0  |  1.87<H>    Ca    9.0      27 Jul 2020 05:26  Mg     2.2     07-25    TPro  6.8  /  Alb  3.4  /  TBili  0.4  /  DBili  x   /  AST  14  /  ALT  18  /  AlkPhos  133<H>  07-27    proBNP: Serum Pro-Brain Natriuretic Peptide: 2235 pg/mL (07-25 @ 23:29)    Lipid Profile:   HgA1c:   TSH:       TELEMETRY: Reviewed  SR

## 2020-07-27 NOTE — PROGRESS NOTE ADULT - ASSESSMENT
Pt states that she has recently been admitted to [facility] for treatment for septic d/t wound in right foot. Prior to this hospital admission, patient states she has been experiencing  chest pain over the past three months that has been increasing in severity. Chest Pain is described as intermittent, left sided, non radiating 7/10, cramping, no pain modifying factors noted, with associated SOB. Pt had o2 sat checked at home which was in the 80s. Pt does not have home o2. In ED, Tropx2-negative, BNP-2235, Xray- no lung consolidation, ECG-NSR HR @ 75, T wave abnormalities in lateral leads. Pt currently presents with SOB while on 3L NC. Pt also c/o  baseline back pain. Upon assessment, pt has exp. wheezing and limited air movement, bilateral lower extremity edema with dusky appearance of toes.         Chest Pain  -Tropx2-negative  -Echo 9/2019-mitral annular calcification, minimal MR, thickened AV leaflets, borderline concentric LVH, normal LV sys. fx. EF 59%, no segmental wall motion abnormalities, minimal TR  - Repeat echo still pending.   - U/S duplex negative for DVT.   - NPO after midnight.   - Nuclear stress test tomorrow.     Dyspnea  -BNP-2235  -Xray- no lung consolidation  -Echo-pending, to assess for RWMA, and fluid status  -Cont. use of nebulizer tx  - Cont. Lasix IVP 40mg daily  - CT chest non-contrast still pending.     Acute on CKD  - Downtrending BUN/Cr  - Continue Lasix 40mg IV daily.       Preliminary evaluation, please await complete evaluation by Dr. Horton

## 2020-07-27 NOTE — PROGRESS NOTE ADULT - SUBJECTIVE AND OBJECTIVE BOX
Patient is a 65y old  Female who presents with a chief complaint of acute hypoxemic respiratory failure, acute decompensated heart failure (27 Jul 2020 11:14)      INTERVAL HPI/OVERNIGHT EVENTS:      MEDICATIONS  (STANDING):  amoxicillin  875 milliGRAM(s)/clavulanate 1 Tablet(s) Oral every 12 hours  aspirin enteric coated 81 milliGRAM(s) Oral daily  atorvastatin 80 milliGRAM(s) Oral at bedtime  budesonide 160 MICROgram(s)/formoterol 4.5 MICROgram(s) Inhaler 2 Puff(s) Inhalation two times a day  cholecalciferol 1000 Unit(s) Oral daily  collagenase Ointment 1 Application(s) Topical daily  dextrose 5%. 1000 milliLiter(s) (50 mL/Hr) IV Continuous <Continuous>  dextrose 50% Injectable 12.5 Gram(s) IV Push once  dextrose 50% Injectable 25 Gram(s) IV Push once  dextrose 50% Injectable 25 Gram(s) IV Push once  furosemide   Injectable 40 milliGRAM(s) IV Push daily  heparin   Injectable 7500 Unit(s) SubCutaneous every 8 hours  insulin glargine Injectable (LANTUS) 20 Unit(s) SubCutaneous at bedtime  insulin lispro (HumaLOG) corrective regimen sliding scale   SubCutaneous three times a day before meals  insulin lispro (HumaLOG) corrective regimen sliding scale   SubCutaneous at bedtime  insulin lispro Injectable (HumaLOG) 5 Unit(s) SubCutaneous three times a day before meals  oxyCODONE  ER Tablet 80 milliGRAM(s) Oral every 12 hours  polyethylene glycol 3350 17 Gram(s) Oral daily    MEDICATIONS  (PRN):  ALBUTerol    90 MICROgram(s) HFA Inhaler 2 Puff(s) Inhalation every 6 hours PRN Shortness of Breath and/or Wheezing  clonazePAM  Tablet 0.5 milliGRAM(s) Oral daily PRN Anxiety  dextrose 40% Gel 15 Gram(s) Oral once PRN Blood Glucose LESS THAN 70 milliGRAM(s)/deciliter  glucagon  Injectable 1 milliGRAM(s) IntraMuscular once PRN Glucose LESS THAN 70 milligrams/deciliter  oxycodone    5 mG/acetaminophen 325 mG 1 Tablet(s) Oral every 6 hours PRN Moderate to Severe Pain      Allergies    adhesives (Rash)  latex (Rash)  No Known Drug Allergies  wool- rash, itch (Other)    Intolerances        REVIEW OF SYSTEMS:  CONSTITUTIONAL: No fever, weight loss, or fatigue  EYES: No eye pain, visual disturbances, or discharge  ENMT:  No difficulty hearing, tinnitus, vertigo; No sinus or throat pain  NECK: No pain or stiffness  RESPIRATORY: No cough, wheezing, chills or hemoptysis; No shortness of breath  CARDIOVASCULAR: No chest pain, palpitations, or lightheadedness  GASTROINTESTINAL: No abdominal or epigastric pain. No nausea, vomiting, or hematemesis; No diarrhea or constipation. No melena or hematochezia.  GENITOURINARY: No dysuria, frequency, hematuria, or incontinence  NEUROLOGICAL: No headaches, vertigo, memory loss, loss of strength, numbness, or tremors  SKIN: No itching, burning, rashes, or lesions   LYMPH NODES: No enlarged glands  ENDOCRINE: No heat or cold intolerance; No hair loss; No polydipsia or polyuria  MUSCULOSKELETAL: No back pain  PSYCHIATRIC: No depression, anxiety, or mood swings  HEME/LYMPH: No easy bruising, or bleeding gums  ALLERGY AND IMMUNOLOGIC: No hives or eczema    PHYSICAL EXAM:  GENERAL: NAD, well-groomed, well-developed  HEAD:  Atraumatic, Normocephalic  EYES: EOMI, PERRLA, conjunctiva and sclera clear  ENMT: Moist mucous membranes, Good dentition, No lesions; No tonsillar erythema, exudates, or enlargement   NECK: Supple, No JVD appreciated, Normal thyroid  NERVOUS SYSTEM:  Alert & Oriented X3, Good concentration; Bilateral LE mobile, sensation to light touch intact  CHEST/LUNG: Clear to auscultation bilaterally; No rales, rhonchi, wheezing, or rubs  HEART: Regular rate and rhythm; No murmurs, rubs, or gallops  ABDOMEN: Soft, Nontender, Nondistended; Bowel sounds present  EXTREMITIES:  2+ Peripheral Pulses, No clubbing or cyanosis  LYMPH: No lymphadenopathy noted  SKIN: No rashes or lesions  INCISION:  Dressing dry and intact    Vital Signs Last 24 Hrs  T(C): 36.7 (27 Jul 2020 23:16), Max: 36.7 (27 Jul 2020 18:39)  T(F): 98.1 (27 Jul 2020 23:16), Max: 98.1 (27 Jul 2020 23:16)  HR: 76 (27 Jul 2020 23:16) (69 - 86)  BP: 137/55 (27 Jul 2020 23:16) (132/70 - 155/66)  BP(mean): --  RR: 18 (27 Jul 2020 23:16) (18 - 20)  SpO2: 95% (27 Jul 2020 23:16) (94% - 99%)    LABS:                        12.6   8.21  )-----------( 265      ( 27 Jul 2020 05:26 )             41.2     27 Jul 2020 05:26    138    |  98     |  45.0   ----------------------------<  196    4.7     |  26.0   |  1.87     Ca    9.0        27 Jul 2020 05:26    TPro  6.8    /  Alb  3.4    /  TBili  0.4    /  DBili  x      /  AST  14     /  ALT  18     /  AlkPhos  133    27 Jul 2020 05:26        CAPILLARY BLOOD GLUCOSE      POCT Blood Glucose.: 209 mg/dL (27 Jul 2020 23:28)  POCT Blood Glucose.: 229 mg/dL (27 Jul 2020 16:49)  POCT Blood Glucose.: 203 mg/dL (27 Jul 2020 11:54)  POCT Blood Glucose.: 164 mg/dL (27 Jul 2020 08:18)           [x] Consultant(s) Notes Reviewed Patient is a 65y old  Female who presents with a chief complaint of acute hypoxemic respiratory failure, acute decompensated heart failure (27 Jul 2020 11:14)      INTERVAL HPI/OVERNIGHT EVENTS:  Patient seen awake in bed. She reports feeling well, tolerating diet. Reports some discomfort in R foot. Nursing staff reports no overnight events or tele events.      MEDICATIONS  (STANDING):  amoxicillin  875 milliGRAM(s)/clavulanate 1 Tablet(s) Oral every 12 hours  aspirin enteric coated 81 milliGRAM(s) Oral daily  atorvastatin 80 milliGRAM(s) Oral at bedtime  budesonide 160 MICROgram(s)/formoterol 4.5 MICROgram(s) Inhaler 2 Puff(s) Inhalation two times a day  cholecalciferol 1000 Unit(s) Oral daily  collagenase Ointment 1 Application(s) Topical daily  dextrose 5%. 1000 milliLiter(s) (50 mL/Hr) IV Continuous <Continuous>  dextrose 50% Injectable 12.5 Gram(s) IV Push once  dextrose 50% Injectable 25 Gram(s) IV Push once  dextrose 50% Injectable 25 Gram(s) IV Push once  furosemide   Injectable 40 milliGRAM(s) IV Push daily  heparin   Injectable 7500 Unit(s) SubCutaneous every 8 hours  insulin glargine Injectable (LANTUS) 20 Unit(s) SubCutaneous at bedtime  insulin lispro (HumaLOG) corrective regimen sliding scale   SubCutaneous three times a day before meals  insulin lispro (HumaLOG) corrective regimen sliding scale   SubCutaneous at bedtime  insulin lispro Injectable (HumaLOG) 5 Unit(s) SubCutaneous three times a day before meals  oxyCODONE  ER Tablet 80 milliGRAM(s) Oral every 12 hours  polyethylene glycol 3350 17 Gram(s) Oral daily    MEDICATIONS  (PRN):  ALBUTerol    90 MICROgram(s) HFA Inhaler 2 Puff(s) Inhalation every 6 hours PRN Shortness of Breath and/or Wheezing  clonazePAM  Tablet 0.5 milliGRAM(s) Oral daily PRN Anxiety  dextrose 40% Gel 15 Gram(s) Oral once PRN Blood Glucose LESS THAN 70 milliGRAM(s)/deciliter  glucagon  Injectable 1 milliGRAM(s) IntraMuscular once PRN Glucose LESS THAN 70 milligrams/deciliter  oxycodone    5 mG/acetaminophen 325 mG 1 Tablet(s) Oral every 6 hours PRN Moderate to Severe Pain      Allergies    adhesives (Rash)  latex (Rash)  No Known Drug Allergies  wool- rash, itch (Other)    Intolerances        REVIEW OF SYSTEMS:  CONSTITUTIONAL: No fever, weight loss, or fatigue  EYES: No eye pain, visual disturbances, or discharge  ENMT:  No difficulty hearing, tinnitus, vertigo; No sinus or throat pain  NECK: No pain or stiffness  RESPIRATORY: No cough, wheezing, chills or hemoptysis; No shortness of breath  CARDIOVASCULAR: No chest pain, palpitations, or lightheadedness  GASTROINTESTINAL: No abdominal or epigastric pain. No nausea, vomiting, or hematemesis; No diarrhea or constipation. No melena or hematochezia.  GENITOURINARY: No dysuria, frequency, hematuria, or incontinence  NEUROLOGICAL: No headaches, vertigo, memory loss, loss of strength, numbness, or tremors  SKIN: No itching, burning, rashes, or lesions   LYMPH NODES: No enlarged glands  ENDOCRINE: No heat or cold intolerance; No hair loss; No polydipsia or polyuria  MUSCULOSKELETAL: No back pain  PSYCHIATRIC: No depression, anxiety, or mood swings  HEME/LYMPH: No easy bruising, or bleeding gums  ALLERGY AND IMMUNOLOGIC: No hives or eczema    PHYSICAL EXAM:  GENERAL: NAD, well-groomed, well-developed, obese  HEAD:  Atraumatic, Normocephalic  EYES: EOMI, PERRLA, conjunctiva and sclera clear  ENMT: Moist mucous membranes, Good dentition, No lesions; No tonsillar erythema, exudates, or enlargement   NECK: Supple, No JVD appreciated, Normal thyroid  NERVOUS SYSTEM:  Alert & Oriented X3, Good concentration; Bilateral LE mobile, sensation to light touch intact  CHEST/LUNG: Clear to auscultation bilaterally; No rales, rhonchi, wheezing, or rubs  HEART: Regular rate and rhythm; No murmurs, rubs, or gallops  ABDOMEN: Soft, Nontender, Nondistended; Bowel sounds present  EXTREMITIES:  2+ Peripheral Pulses, No clubbing or cyanosis  LYMPH: No lymphadenopathy noted  SKIN: Blister mass on R plantar foot, popped and deflated    Vital Signs Last 24 Hrs  T(C): 36.7 (27 Jul 2020 23:16), Max: 36.7 (27 Jul 2020 18:39)  T(F): 98.1 (27 Jul 2020 23:16), Max: 98.1 (27 Jul 2020 23:16)  HR: 76 (27 Jul 2020 23:16) (69 - 86)  BP: 137/55 (27 Jul 2020 23:16) (132/70 - 155/66)  BP(mean): --  RR: 18 (27 Jul 2020 23:16) (18 - 20)  SpO2: 95% (27 Jul 2020 23:16) (94% - 99%)    LABS:                        12.6   8.21  )-----------( 265      ( 27 Jul 2020 05:26 )             41.2     27 Jul 2020 05:26    138    |  98     |  45.0   ----------------------------<  196    4.7     |  26.0   |  1.87     Ca    9.0        27 Jul 2020 05:26    TPro  6.8    /  Alb  3.4    /  TBili  0.4    /  DBili  x      /  AST  14     /  ALT  18     /  AlkPhos  133    27 Jul 2020 05:26        CAPILLARY BLOOD GLUCOSE      POCT Blood Glucose.: 209 mg/dL (27 Jul 2020 23:28)  POCT Blood Glucose.: 229 mg/dL (27 Jul 2020 16:49)  POCT Blood Glucose.: 203 mg/dL (27 Jul 2020 11:54)  POCT Blood Glucose.: 164 mg/dL (27 Jul 2020 08:18)           [x] Consultant(s) Notes Reviewed

## 2020-07-28 ENCOUNTER — TRANSCRIPTION ENCOUNTER (OUTPATIENT)
Age: 65
End: 2020-07-28

## 2020-07-28 LAB
ANION GAP SERPL CALC-SCNC: 11 MMOL/L — SIGNIFICANT CHANGE UP (ref 5–17)
ANION GAP SERPL CALC-SCNC: 14 MMOL/L — SIGNIFICANT CHANGE UP (ref 5–17)
BUN SERPL-MCNC: 42 MG/DL — HIGH (ref 8–20)
BUN SERPL-MCNC: 42 MG/DL — HIGH (ref 8–20)
CALCIUM SERPL-MCNC: 9.6 MG/DL — SIGNIFICANT CHANGE UP (ref 8.6–10.2)
CALCIUM SERPL-MCNC: 9.7 MG/DL — SIGNIFICANT CHANGE UP (ref 8.6–10.2)
CHLORIDE SERPL-SCNC: 101 MMOL/L — SIGNIFICANT CHANGE UP (ref 98–107)
CHLORIDE SERPL-SCNC: 96 MMOL/L — LOW (ref 98–107)
CO2 SERPL-SCNC: 25 MMOL/L — SIGNIFICANT CHANGE UP (ref 22–29)
CO2 SERPL-SCNC: 30 MMOL/L — HIGH (ref 22–29)
CREAT SERPL-MCNC: 1.6 MG/DL — HIGH (ref 0.5–1.3)
CREAT SERPL-MCNC: 1.86 MG/DL — HIGH (ref 0.5–1.3)
GLUCOSE BLDC GLUCOMTR-MCNC: 173 MG/DL — HIGH (ref 70–99)
GLUCOSE BLDC GLUCOMTR-MCNC: 187 MG/DL — HIGH (ref 70–99)
GLUCOSE BLDC GLUCOMTR-MCNC: 240 MG/DL — HIGH (ref 70–99)
GLUCOSE BLDC GLUCOMTR-MCNC: 250 MG/DL — HIGH (ref 70–99)
GLUCOSE SERPL-MCNC: 172 MG/DL — HIGH (ref 70–99)
GLUCOSE SERPL-MCNC: 268 MG/DL — HIGH (ref 70–99)
HCT VFR BLD CALC: 42.8 % — SIGNIFICANT CHANGE UP (ref 34.5–45)
HGB BLD-MCNC: 13.2 G/DL — SIGNIFICANT CHANGE UP (ref 11.5–15.5)
MAGNESIUM SERPL-MCNC: 2.2 MG/DL — SIGNIFICANT CHANGE UP (ref 1.6–2.6)
MCHC RBC-ENTMCNC: 28.6 PG — SIGNIFICANT CHANGE UP (ref 27–34)
MCHC RBC-ENTMCNC: 30.8 GM/DL — LOW (ref 32–36)
MCV RBC AUTO: 92.8 FL — SIGNIFICANT CHANGE UP (ref 80–100)
PLATELET # BLD AUTO: 275 K/UL — SIGNIFICANT CHANGE UP (ref 150–400)
POTASSIUM SERPL-MCNC: 4.8 MMOL/L — SIGNIFICANT CHANGE UP (ref 3.5–5.3)
POTASSIUM SERPL-MCNC: 5.8 MMOL/L — HIGH (ref 3.5–5.3)
POTASSIUM SERPL-SCNC: 4.8 MMOL/L — SIGNIFICANT CHANGE UP (ref 3.5–5.3)
POTASSIUM SERPL-SCNC: 5.8 MMOL/L — HIGH (ref 3.5–5.3)
RBC # BLD: 4.61 M/UL — SIGNIFICANT CHANGE UP (ref 3.8–5.2)
RBC # FLD: 13.9 % — SIGNIFICANT CHANGE UP (ref 10.3–14.5)
SODIUM SERPL-SCNC: 137 MMOL/L — SIGNIFICANT CHANGE UP (ref 135–145)
SODIUM SERPL-SCNC: 139 MMOL/L — SIGNIFICANT CHANGE UP (ref 135–145)
WBC # BLD: 8.19 K/UL — SIGNIFICANT CHANGE UP (ref 3.8–10.5)
WBC # FLD AUTO: 8.19 K/UL — SIGNIFICANT CHANGE UP (ref 3.8–10.5)

## 2020-07-28 PROCEDURE — 71250 CT THORAX DX C-: CPT | Mod: 26

## 2020-07-28 PROCEDURE — 99232 SBSQ HOSP IP/OBS MODERATE 35: CPT

## 2020-07-28 PROCEDURE — 93351 STRESS TTE COMPLETE: CPT | Mod: 26

## 2020-07-28 PROCEDURE — 99233 SBSQ HOSP IP/OBS HIGH 50: CPT

## 2020-07-28 RX ORDER — DOBUTAMINE HCL 250MG/20ML
10 VIAL (ML) INTRAVENOUS
Qty: 500 | Refills: 0 | Status: DISCONTINUED | OUTPATIENT
Start: 2020-07-28 | End: 2020-07-28

## 2020-07-28 RX ADMIN — Medication 1: at 12:12

## 2020-07-28 RX ADMIN — OXYCODONE AND ACETAMINOPHEN 1 TABLET(S): 5; 325 TABLET ORAL at 15:00

## 2020-07-28 RX ADMIN — Medication 1: at 07:44

## 2020-07-28 RX ADMIN — OXYCODONE HYDROCHLORIDE 80 MILLIGRAM(S): 5 TABLET ORAL at 18:18

## 2020-07-28 RX ADMIN — POLYETHYLENE GLYCOL 3350 17 GRAM(S): 17 POWDER, FOR SOLUTION ORAL at 12:14

## 2020-07-28 RX ADMIN — BUDESONIDE AND FORMOTEROL FUMARATE DIHYDRATE 2 PUFF(S): 160; 4.5 AEROSOL RESPIRATORY (INHALATION) at 20:25

## 2020-07-28 RX ADMIN — OXYCODONE AND ACETAMINOPHEN 1 TABLET(S): 5; 325 TABLET ORAL at 14:09

## 2020-07-28 RX ADMIN — BUDESONIDE AND FORMOTEROL FUMARATE DIHYDRATE 2 PUFF(S): 160; 4.5 AEROSOL RESPIRATORY (INHALATION) at 08:39

## 2020-07-28 RX ADMIN — Medication 40 MILLIGRAM(S): at 06:24

## 2020-07-28 RX ADMIN — Medication 52.4 MICROGRAM(S)/KG/MIN: at 11:13

## 2020-07-28 RX ADMIN — HEPARIN SODIUM 7500 UNIT(S): 5000 INJECTION INTRAVENOUS; SUBCUTANEOUS at 06:32

## 2020-07-28 RX ADMIN — HEPARIN SODIUM 7500 UNIT(S): 5000 INJECTION INTRAVENOUS; SUBCUTANEOUS at 14:09

## 2020-07-28 RX ADMIN — Medication 1000 UNIT(S): at 12:14

## 2020-07-28 RX ADMIN — Medication 2: at 17:12

## 2020-07-28 RX ADMIN — Medication 5 UNIT(S): at 17:13

## 2020-07-28 RX ADMIN — ATORVASTATIN CALCIUM 80 MILLIGRAM(S): 80 TABLET, FILM COATED ORAL at 21:37

## 2020-07-28 RX ADMIN — Medication 5 UNIT(S): at 12:13

## 2020-07-28 RX ADMIN — Medication 81 MILLIGRAM(S): at 12:14

## 2020-07-28 RX ADMIN — OXYCODONE HYDROCHLORIDE 80 MILLIGRAM(S): 5 TABLET ORAL at 17:17

## 2020-07-28 RX ADMIN — HEPARIN SODIUM 7500 UNIT(S): 5000 INJECTION INTRAVENOUS; SUBCUTANEOUS at 21:37

## 2020-07-28 RX ADMIN — OXYCODONE HYDROCHLORIDE 80 MILLIGRAM(S): 5 TABLET ORAL at 07:30

## 2020-07-28 RX ADMIN — OXYCODONE HYDROCHLORIDE 80 MILLIGRAM(S): 5 TABLET ORAL at 06:58

## 2020-07-28 RX ADMIN — INSULIN GLARGINE 20 UNIT(S): 100 INJECTION, SOLUTION SUBCUTANEOUS at 21:37

## 2020-07-28 NOTE — CONSULT NOTE ADULT - SUBJECTIVE AND OBJECTIVE BOX
Chief Complaint : Patient is a 65y old  Female who presents with a chief complaint of acute hypoxemic respiratory failure, acute decompensated heart failure (28 Jul 2020 14:30)    HPI : HPI:  65yoF hx morbid obesity, chronic pain syndrome from spinal stenosis on opioids, HTN, DM complicated by neuropathy and charcot foot, CKD, chronic leg edema due to venous stasis, discharged from [facility] 2 days ago for sepsis due to right foot wound infection presenting with progressive dyspnea and episodes of hypoxia at home. Pt reports having chronic dyspnea on exertion and at rest for several months, but it has been worsening since she was discharged from the hospital 2 days ago. She checks her pulse ox at home and has noticed readings in the 80s. Her baseline leg swelling is also worse than usual.  She has difficulty laying flat due to her spinal stenosis.  She also has been having intermittent, non-radiating left sided chest pain which reports as cramping in nature, lasting a few minutes in duration and not associated with exertion or rest.  Reports that she was recently diagnosed with asthma by her pulmonologist, placed on maintenance inhalers which she uses but still having persistent symptoms. She was advised to take a sleep study (per outpatient records due to suspect OHS) which has been on hold due to COVID pandemic.  Pt reports she had a nuclear stress this several months ago by cardiologist and was told that the study was abnormal, however did not undergo a cardiac cath.  She denies known hx of CHF and reports being on diuretic for leg swelling due to venous stasis.     pt seen by podiatry in [facility], AAOx3. pt states she has a podiatrists that has been following up with her as outpt. states ulcer started a few weeks ago was infected and was treated at [place] and has been improving. pt denies any other complaints.       Patient admits to  (-) Fevers, (-) Chills, (-) Nausea, (-) Vomiting, (-) Shortness of Breath      PMH: Other fecal abnormalities  Narrow angle glaucoma of left eye  CKD (chronic kidney disease)  Insomnia  Edema of lower extremity  Vitamin D deficiency  Morbid Obesity  Cataract  Dyslipidemia  Deep Vein Thrombosis (DVT)  Spinal Stenosis, Lumbar  Cervical Stenosis of Spine  Endometrial Hyperplasia  HTN (Hypertension)  Diabetes Mellitus Type II    PSH:S/P cholecystectomy  S/P appendectomy  S/P total abdominal hysterectomy and bilateral salpingo-oophorectomy  H/O colonoscopy  H/O laser iridotomy  Endometrial biopsy 12/02/09  Tonsillectomy as a child  Cervical Spinal Stenosis surgery x2 (01/2002, 7/2002)  D&C 2008  D&C x2 1980's  Cholecystectomy/appendectomy @ age 25  C Section 1994  Cataract extracted with lens implant 1998      Allergies:adhesives (Rash)  latex (Rash)  No Known Drug Allergies  wool- rash, itch (Other)      Labs:                          13.2   8.19  )-----------( 275      ( 28 Jul 2020 07:36 )             42.8     WBC Trend  8.19 Date (07-28 @ 07:36)  8.21 Date (07-27 @ 05:26)  10.61<H> Date (07-25 @ 23:29)  9.25 Date (07-23 @ 06:55)  9.15 Date (07-22 @ 07:06)  10.88<H> Date (07-21 @ 07:13)  14.65<H> Date (07-20 @ 12:46)      Chem  07-28    139  |  101  |  42.0<H>  ----------------------------<  172<H>  5.8<H>   |  25.0  |  1.60<H>    Ca    9.7      28 Jul 2020 06:22  Mg     2.2     07-28    TPro  6.8  /  Alb  3.4  /  TBili  0.4  /  DBili  x   /  AST  14  /  ALT  18  /  AlkPhos  133<H>  07-27          T(F): 98.2 (07-28-20 @ 07:23), Max: 98.2 (07-28-20 @ 07:23)  HR: 77 (07-28-20 @ 08:40) (72 - 86)  BP: 128/69 (07-28-20 @ 07:23) (128/69 - 137/55)  RR: 20 (07-28-20 @ 07:23) (18 - 20)  SpO2: 97% (07-28-20 @ 08:40) (94% - 99%)  Wt(kg): --    O:   General: Pleasant  female NAD & AOX3.    Integument:  Skin warm, dry and supple bilateral.    Ulceration Right plantar heel, hyperkeratotic border, wound base Granular, wound size (8.0 cm X 7.0 cm X 0.1cm) and right lateral heel normal border granular base (4.0 x 4.0 x 0.1cm), + edema, + modesto-wound erythema, - purulence, - fluctuance, - tracking/tunneling, - probe to bone.   Vascular: Dorsalis Pedis and Posterior Tibial pulses 2/4.  Capillary re-fill time less then 3 seconds digits 1-5 bilateral.    Neuro: Protective sensation diminished to the level of the digits bilateral.  MSK: Muscle strength 5/5 all major muscle groups bilateral.  Deformity:  A: Right foot ulceration      P:   Chart reviewed and Patient evaluated  Discussed diagnosis and treatment with patient  Wound flush with normal saline  Applied santyl with dry sterile dressing  Continue with IV antibiotics As Per ID  Weight bearing as tolerated  Offloading to bilateral Heels.   Discussed importance of daily foot examinations and proper shoe gear and to importance of lower Fasting Blood Glucose levels.   Podiatry will follow while in house.  Pt stable from podiatric standpoint, no plan for surgical management   Discussed with Attending Dr Mitchell

## 2020-07-28 NOTE — PHYSICAL THERAPY INITIAL EVALUATION ADULT - PERTINENT HX OF CURRENT PROBLEM, REHAB EVAL
65yoF hx morbid obesity, chronic pain from spinal stenosis on opioids, venous stasis with chronic leg edema, HTN, DM and charcot foot, CKD, d/c from [facility] 2 days ago for sepsis 2*2 right foot wound infection/cellulitis, p/w worsening dyspnea and baseline leg swelling with episodes of hypoxia at home and hypoxic in 80s on admission, also with episodes of intermittent chest pain and recent abnormal outpatient stress test. A/w Acute hypoxemic respiratory failure 2*2 new onset acute decompensated CHF

## 2020-07-28 NOTE — PROGRESS NOTE ADULT - SUBJECTIVE AND OBJECTIVE BOX
[place] [facility]                                                               Office: [address] [address]                                                              Telephone: [phone]. Fax:[phone]                                                                             PROGRESS NOTE  Reason for follow up: acute hypoxemic respiratory failure, acute decompensated heart failure  Overnight: No new events.   Update: 7/28: Patient evaluated s/p NST (results pending). Patient denies CP or SOB.      Review of symptoms:   Cardiac:  No chest pain. No dyspnea. No palpitations.  Respiratory: No cough. No dyspnea  Gastrointestinal: No diarrhea. No abdominal pain. No bleeding.     Past medical history: No updates.   	  Vitals:  T(C): 36.8 (07-28-20 @ 07:23), Max: 36.8 (07-28-20 @ 07:23)  HR: 77 (07-28-20 @ 08:40) (72 - 86)  BP: 128/69 (07-28-20 @ 07:23) (128/69 - 150/74)  RR: 20 (07-28-20 @ 07:23) (18 - 20)  SpO2: 97% (07-28-20 @ 08:40) (94% - 99%)  Wt(kg): --  I&O's Summary    27 Jul 2020 07:01  -  28 Jul 2020 07:00  --------------------------------------------------------  IN: 0 mL / OUT: 700 mL / NET: -700 mL      Weight (kg): 174.6 (07-25 @ 21:25)      PHYSICAL EXAM:  Appearance: Comfortable. No acute distress  HEENT:  Head and neck: Atraumatic. Normocephalic.  Normal oral mucosa, PERRL, Neck is supple. No JVD, No carotid bruit.   Neurologic: A&Ox 3, no focal deficits. EOMI, Cranial nerves are intact.  Lymphatic: No cervical lymphadenopathy  Cardiovascular: Normal S1 S2, No murmur, rubs/gallops. No JVD, No edema  Respiratory: diminished b/l LL  Gastrointestinal:  Soft, Non-tender, + BS  Lower Extremities: 4+ b/l LE edema  Psychiatry: Patient is calm. No agitation. Mood & affect appropriate  Skin: right foot dressing in place, CDI      CURRENT MEDICATIONS:  DOBUTamine Infusion 10 MICROgram(s)/kG/Min IV Continuous <Continuous>  furosemide   Injectable 40 milliGRAM(s) IV Push daily    budesonide 160 MICROgram(s)/formoterol 4.5 MICROgram(s) Inhaler  oxyCODONE  ER Tablet  polyethylene glycol 3350  atorvastatin  dextrose 50% Injectable  dextrose 50% Injectable  dextrose 50% Injectable  insulin glargine Injectable (LANTUS)  insulin lispro (HumaLOG) corrective regimen sliding scale  insulin lispro (HumaLOG) corrective regimen sliding scale  insulin lispro Injectable (HumaLOG)  aspirin enteric coated  cholecalciferol  collagenase Ointment  dextrose 5%.  heparin   Injectable      DIAGNOSTIC TESTING:  [ ] Echocardiogram:   < from: TTE Echo Complete w/ Contrast w/ Doppler (07.28.20 @ 09:58) >  Summary:   1. Left ventricular ejection fraction, by visual estimation,is 55 to 60%.   2. Normal global left ventricular systolic function.   3. Spectral Doppler shows pseudonormal pattern of left ventricular myocardial filling (Grade II diastolic dysfunction).   4. There is no evidence of pericardial effusion.   5. Sclerotic aortic valve with normal opening.   6. Estimated pulmonary artery systolic pressure is 53.4 mmHg assuming a right atrial pressure of 5 mmHg, which is consistent with moderate pulmonary hypertension.   7. Endocardial visualization was enhanced with intravenous echo contrast.    MD Trenton Electronically signed on 7/28/2020 at 2:08:28 PM    < end of copied text >    [ ]  Catheterization:  [ ] Stress Test:    OTHER: 	      LABS:	 	  CARDIAC MARKERS ( 26 Jul 2020 07:52 )  x     / <0.01 ng/mL / 100 U/L / x     / x      p-BNP 26 Jul 2020 07:52: x    , CARDIAC MARKERS ( 25 Jul 2020 23:29 )  x     / 0.01 ng/mL / x     / x     / x      p-BNP 25 Jul 2020 23:29: 2235 pg/mL                          13.2   8.19  )-----------( 275      ( 28 Jul 2020 07:36 )             42.8     07-28    139  |  101  |  42.0<H>  ----------------------------<  172<H>  5.8<H>   |  25.0  |  1.60<H>    Ca    9.7      28 Jul 2020 06:22  Mg     2.2     07-28    TPro  6.8  /  Alb  3.4  /  TBili  0.4  /  DBili  x   /  AST  14  /  ALT  18  /  AlkPhos  133<H>  07-27    proBNP: Serum Pro-Brain Natriuretic Peptide: 2235 pg/mL (07-25 @ 23:29)    Lipid Profile:   HgA1c:   TSH:       TELEMETRY: SR 80s [place] [facility]                                                               Office: [address] [address]                                                              Telephone: [phone]. Fax:[phone]                                                                             PROGRESS NOTE  Reason for follow up: acute hypoxemic respiratory failure, acute decompensated heart failure  Overnight: No new events.   Update: 7/28: Patient evaluated s/p NST (results pending). Patient denies CP or SOB.    Review of symptoms:   Cardiac:  No chest pain. No dyspnea. No palpitations.  Respiratory: No cough. No dyspnea  Gastrointestinal: No diarrhea. No abdominal pain. No bleeding.     Past medical history: No updates.   	  Vitals:  T(C): 36.8 (07-28-20 @ 07:23), Max: 36.8 (07-28-20 @ 07:23)  HR: 77 (07-28-20 @ 08:40) (72 - 86)  BP: 128/69 (07-28-20 @ 07:23) (128/69 - 150/74)  RR: 20 (07-28-20 @ 07:23) (18 - 20)  SpO2: 97% (07-28-20 @ 08:40) (94% - 99%)  Wt(kg): --  I&O's Summary    27 Jul 2020 07:01  -  28 Jul 2020 07:00  --------------------------------------------------------  IN: 0 mL / OUT: 700 mL / NET: -700 mL    PHYSICAL EXAM:  Appearance: Comfortable. No acute distress  HEENT:  Head and neck: Atraumatic. Normocephalic.  Normal oral mucosa, PERRL, Neck is supple. No JVD, No carotid bruit.   Neurologic: A&Ox 3, no focal deficits. EOMI, Cranial nerves are intact.  Lymphatic: No cervical lymphadenopathy  Cardiovascular: Normal S1 S2, No murmur, rubs/gallops. No JVD, No edema  Respiratory: diminished b/l LL  Gastrointestinal:  Soft, Non-tender, + BS  Lower Extremities: 4+ b/l LE edema  Psychiatry: Patient is calm. No agitation. Mood & affect appropriate  Skin: right foot dressing in place, CDI      CURRENT MEDICATIONS:  DOBUTamine Infusion 10 MICROgram(s)/kG/Min IV Continuous <Continuous>  furosemide   Injectable 40 milliGRAM(s) IV Push daily    budesonide 160 MICROgram(s)/formoterol 4.5 MICROgram(s) Inhaler  oxyCODONE  ER Tablet  polyethylene glycol 3350  atorvastatin  dextrose 50% Injectable  dextrose 50% Injectable  dextrose 50% Injectable  insulin glargine Injectable (LANTUS)  insulin lispro (HumaLOG) corrective regimen sliding scale  insulin lispro (HumaLOG) corrective regimen sliding scale  insulin lispro Injectable (HumaLOG)  aspirin enteric coated  cholecalciferol  collagenase Ointment  dextrose 5%.  heparin   Injectable      DIAGNOSTIC TESTING:  [ ] Echocardiogram:   < from: TTE Echo Complete w/ Contrast w/ Doppler (07.28.20 @ 09:58) >  Summary:   1. Left ventricular ejection fraction, by visual estimation,is 55 to 60%.   2. Normal global left ventricular systolic function.   3. Spectral Doppler shows pseudonormal pattern of left ventricular myocardial filling (Grade II diastolic dysfunction).   4. There is no evidence of pericardial effusion.   5. Sclerotic aortic valve with normal opening.   6. Estimated pulmonary artery systolic pressure is 53.4 mmHg assuming a right atrial pressure of 5 mmHg, which is consistent with moderate pulmonary hypertension.   7. Endocardial visualization was enhanced with intravenous echo contrast.    MD Trenton Electronically signed on 7/28/2020 at 2:08:28 PM    < end of copied text >    LABS:	 	  CARDIAC MARKERS ( 26 Jul 2020 07:52 )  x     / <0.01 ng/mL / 100 U/L / x     / x      p-BNP 26 Jul 2020 07:52: x    , CARDIAC MARKERS ( 25 Jul 2020 23:29 )  x     / 0.01 ng/mL / x     / x     / x      p-BNP 25 Jul 2020 23:29: 2235 pg/mL                          13.2   8.19  )-----------( 275      ( 28 Jul 2020 07:36 )             42.8     07-28    139  |  101  |  42.0<H>  ----------------------------<  172<H>  5.8<H>   |  25.0  |  1.60<H>    Ca    9.7      28 Jul 2020 06:22  Mg     2.2     07-28    TPro  6.8  /  Alb  3.4  /  TBili  0.4  /  DBili  x   /  AST  14  /  ALT  18  /  AlkPhos  133<H>  07-27    proBNP: Serum Pro-Brain Natriuretic Peptide: 2235 pg/mL (07-25 @ 23:29)    Lipid Profile:   HgA1c:   TSH:       TELEMETRY: SR 80s

## 2020-07-28 NOTE — PHYSICAL THERAPY INITIAL EVALUATION ADULT - ADDITIONAL COMMENTS
Pt lives in a private home with her twins sons (one is disabled, has an aide, 2nd is a Pre-med student who currently works and will be returning to school in August) No steps to enter pt has a ramp available, No steps inside. Pt was independent PTA with RW for transfers to/from commode only. (pt states she was on 3 week "bedrest" 2*2 BLE vascular problems PTA?) Pt owns RW, commode, shower chair and motorized w/c.

## 2020-07-28 NOTE — PROGRESS NOTE ADULT - ASSESSMENT
Pt states that she has recently been admitted to [facility] for treatment for septic d/t wound in right foot. Prior to this hospital admission, patient states she has been experiencing  chest pain over the past three months that has been increasing in severity. Chest Pain is described as intermittent, left sided, non radiating 7/10, cramping, no pain modifying factors noted, with associated SOB. Pt had o2 sat checked at home which was in the 80s. Pt does not have home o2. In ED, Tropx2-negative, BNP-2235, Xray- no lung consolidation, ECG-NSR HR @ 75, T wave abnormalities in lateral leads. Pt currently presents with SOB while on 3L NC. Pt also c/o  baseline back pain. Upon assessment, pt has exp. wheezing and limited air movement, bilateral lower extremity edema with dusky appearance of toes.       7/28: Patient evaluated s/p NST (results pending). Patient denies CP or SOB.    Chest Pain  -Tropx2-negative  -Echo 9/2019-mitral annular calcification, minimal MR, thickened AV leaflets, borderline concentric LVH, normal LV sys. fx. EF 59%, no segmental wall motion abnormalities, minimal TR  - Repeat TTE w/mod pHTN, no significant valvulopathy or WMA  - U/S duplex negative for DVT.   - NST results pending    Dyspnea  -BNP-2235  -Xray- no lung consolidation  -Echo-pending, to assess for RWMA, and fluid status  -Cont. use of nebulizer tx  - Cont. Lasix IVP 40mg daily  - CT chest non-contrast still pending.     Acute on CKD  - Downtrending BUN/Cr  - Continue Lasix 40mg IV daily.       Preliminary evaluation, please await complete evaluation by Dr. Horton

## 2020-07-28 NOTE — PHYSICAL THERAPY INITIAL EVALUATION ADULT - TRANSFER SAFETY CONCERNS NOTED: BED/CHAIR, REHAB EVAL
Discussion/Summary   Blood  Work shows hemoglobin A1c at 6 which makes she was prediabetic and recommend that you follow strict diet with no sweets and cut down on carbohydrates.  Your HDL level which is the good part of the cholesterol was also low and recommend you increase activity level with exercise to improve numbers rest of your lipid profile was at goal.  Rest of your blood work was all stable including prostate-specific antigen.        Verified Results  COMP METABOLIC PANEL WITH CBCA, LIPID PANEL AND TSH (CMP,CBCA,LIPFA,TSH) 21Jun2018 11:10AM [facility]     Test Name Result Flag Reference   WHITE BLOOD COUNT 7.8 K/mcL  4.2-11.0   RED CELL COUNT 5.43 mil/mcL  4.50-5.90   HEMOGLOBIN 15.8 g/dl  13.0-17.0   HEMATOCRIT 49.8 %  39.0-51.0   MEAN CORPUSCULAR VOLUME 91.7 fL  78.0-100.0   MEAN CORPUSCULAR HEMOGLOBIN 29.1 pg  26.0-34.0   MEAN CORPUSCULAR HGB CONC 31.7 g/dl L 32.0-36.5   RDW-CV 13.4 %  11.0-15.0   PLATELET COUNT 221 K/mcL  140-450   CONNIE% 58 %     LYM% 28 %     MON% 8 %     EOS% 3 %     BASO% 1 %     CONNIE ABS 4.6 K/mcL  1.8-7.7   LYM ABS 2.1 K/mcL  1.0-4.0   MON ABS 0.6 K/mcL  0.3-0.9   EOS ABS 0.2 K/mcL  0.1-0.5   BASO ABS 0.1 K/mcL  0.0-0.3   SODIUM 142 mmol/L  135-145   POTASSIUM 4.3 mmol/L  3.4-5.1   CHLORIDE 105 mmol/L     CARBON DIOXIDE 27 mmol/L  21-32   ANION GAP 14 mmol/L  10-20   GLUCOSE 102 mg/dl H 65-99   BUN 11 mg/dl  6-20   CREATININE 0.84 mg/dl  0.67-1.17   GFR EST.AFRICAN AMER >90     eGFR results = or >90 mL/min/1.73m2 = Normal kidney function.   GFR EST.NONAFRI AMER >90     eGFR results = or >90 mL/min/1.73m2 = Normal kidney function.   BUN/CREATININE RATIO 13  7-25   BILIRUBIN TOTAL 0.6 mg/dl  0.2-1.0   GOT/AST 37 Units/L  <38   ALKALINE PHOSPHATASE 68 Units/L     ALBUMIN 4.3 g/dl  3.6-5.1   TOTAL PROTEIN 7.7 g/dl  6.4-8.2   GLOBULIN (CALCULATED) 3.4 g/dl  2.0-4.0   A/G RATIO 1.3  1.0-2.4   CALCIUM 8.8 mg/dl  8.4-10.2   GPT/ALT 75 Units/L  <79   FASTING STATUS  UNKNOWN hrs     CHOLESTEROL 176 mg/dl  <200   Desirable            <200  Borderline High      200 to 239  High                 >=240   HDL CHOLESTEROL 39 mg/dl L >39   Low            <40  Borderline Low 40 to 49  Near Optimal   50 to 59  Optimal        >=60   TRIGLYCERIDES 148 mg/dl  <150   Normal                   <150  Borderline High          150 to 199  High                     200 to 499  Very High                >=500   LDL CHOLESTEROL (CALCULATED) 107 mg/dl  <130   OPTIMAL               <100  NEAR OPTIMAL          100-129  BORDERLINE HIGH       130-159  HIGH                  160-189  VERY HIGH             >=190   NON-HDL CHOLESTEROL 137 mg/dl     Therapeutic Target:  CHD and risk equivalents <130  Multiple risk factors    <160  0 to 1 risk factors      <190   CHOLESTEROL/HDL RATIO 4.5 H <4.5   TSH 2.689 mcUnits/mL  0.350-5.000   DIFF TYPE      AUTOMATED DIFFERENTIAL   FASTING STATUS UNKNOWN hrs     NRBC 0 /100 WBC  0   PERCENT IMMATURE GRANULOCYTES 2 %     ABSOLUTE IMMATURE GRANULOCYTES 0.2 K/mcL  0-0.2     HEMOGLOBIN A1C GLYCOSYLATED 21Jun2018 11:10AM [facility] [employer]     Test Name Result Flag Reference   HEMOGLOBIN A1C GLYH 6.0 % H 4.5-5.6   ----DIABETIC SCREENING---  NON DIABETIC                 <5.7%  INCREASED RISK                5.7-6.4%  DIAGNOSTIC FOR DIABETES      >6.4%     ----DIABETIC CONTROL---     A1C%           eAG mg/dL  6.0            126  6.5            140  7.0            154  7.5            169  8.0            183  8.5            197  9.0            212  9.5            226  10.0           240     PSA - PROSTATE SPECIFIC AG 21Jun2018 11:10AM R[facility]     Test Name Result Flag Reference   PROSTATE SPECIFIC AG 1.07 ng/ml  <4.01   SUGGESTED AGE SPECIFIC REFERENCE RANGES     AGE                NG/ML  40-49              0.01-2.50  50-59              0.01-3.50  60-69              0.01-4.50  70-79              0.01-6.50     REFERENCE: JOURNAL OF UROLOGY 155, [id number]     [employer] Chemiluminescence        decreased weight-shifting ability

## 2020-07-28 NOTE — DISCHARGE NOTE NURSING/CASE MANAGEMENT/SOCIAL WORK - NSDCFUADDAPPT_GEN_ALL_CORE_FT
YOU HAVE NO ISSUES GETTING YOUR MEDS AT [address] [address] PHONE # [phone] YOU HAVE NO ISSUES GETTING YOUR MEDS AT [address] [address] PHONE # [phone]  DR. WILKINS'S OFFICE WILL CALL YOU FOR A FOLLOW UP PULMONARY APPOINTMENT

## 2020-07-28 NOTE — PROGRESS NOTE ADULT - ASSESSMENT
65yoF hx morbid obesity, chronic pain syndrome from spinal stenosis on opioids, venous stasis with chronic leg edema, HTN, DM complicated by neuropathy and charcot foot, CKD, discharged from [facility] 2 days ago for sepsis due to right foot wound infection/cellulitis, presenting with worsening dyspnea and baseline leg swelling with episodes of hypoxia at home and hypoxic in 80s on admission, also with episodes of intermittent chest pain and pt reporting recent abnormal outpatient stress test    Acute hypoxemic respiratory failure due to new onset acute decompensated CHF  -Clinical hx suggestive of CHF, also with worsening proBNP and recent chest pain concerning for ischemia   - IV Lasix 40mg daily for now  -TTE perforemd  -Cardiology following, Dobutamine stress test completed, results pending    R-foot wound infection/cellulitis   -Pt recently hospitalized at [facility] for above and discharged on Augmentin   -Augmentin end date  7/27/2020  -Continue with collagenase QD  -Podiatry consult for further management  Leg dopplers negative    Chronic pain syndrome from spinal stenosis  -On OxyContin 80mg BID and hydrocodone/acetaminophen 7.5/325mg PRN and was iSTOP’ed during last hospitalization (chart note reflecting this on 7/20/2020)  -OxyContin 80mg resumed Percocet PRN   -Bowel regimen with daily miralax    DM with neuropathy   Insulin lantus   -Insulin sliding scale    CKD  -Admission Cr 2.34, baseline ~1.8,   -Being diuresed for CHF exacerbation as above, monitor renal function closely     Asthma  -Symbicort and albuterol PRN resumed     Morbid obesity  -Nutrition consult  -Bariatric Bed  -PT consult    HLD  -continue home Statin

## 2020-07-28 NOTE — DISCHARGE NOTE NURSING/CASE MANAGEMENT/SOCIAL WORK - PATIENT PORTAL LINK FT
You can access the FollowMyHealth Patient Portal offered by [facility] by registering at the following website: [email or web address]. By joining [facility]’s FollowMyHealth portal, you will also be able to view your health information using other applications (apps) compatible with our system.

## 2020-07-28 NOTE — PHYSICAL THERAPY INITIAL EVALUATION ADULT - GAIT DEVIATIONS NOTED, PT EVAL
decreased rito/decreased weight-shifting ability/Pt also limited by LEGER, needing standing and seated rest breaks after each transfer.

## 2020-07-28 NOTE — PROGRESS NOTE ADULT - ATTENDING COMMENTS
Pt is a 66 yo F w Hx morbid obesity, chronic pain syndrome from spinal stenosis on opioids, venous stasis with chronic leg edema, HTN, DM complicated by neuropathy and charcot foot, CKD, who presents with ADHF and R foot cellulitis. podiatry saw pt and signed off with orders, cardiology completed nuclear stress test today. Awaiting results, will plan to D/C home with home PT after cardiology completes evaluation.
above noted.pt with sob.  ct chest and stress test pending  cont with diuretics for now  I will follow with you
pt seen and examined  CT scan results reviewed  pt with negative stress test  SOB maybe due obesity and anil  pt with pulmonary HTN based on TTE.  plan for [facility]HC.  I will follow with marion

## 2020-07-28 NOTE — PROGRESS NOTE ADULT - SUBJECTIVE AND OBJECTIVE BOX
CC: acute hypoxemic respiratory failure, acute decompensated heart failure     INTERVAL HPI/OVERNIGHT EVENTS: Patient seen and examined. C/O anxiety over tests to be performed today. All questions answered, able to complete Nuke and echo this am. Denies chest pain, SOB, nausea, vomiting, fever, chills.     Vital Signs Last 24 Hrs  T(C): 36.8 (28 Jul 2020 07:23), Max: 36.8 (28 Jul 2020 07:23)  T(F): 98.2 (28 Jul 2020 07:23), Max: 98.2 (28 Jul 2020 07:23)  HR: 77 (28 Jul 2020 08:40) (72 - 86)  BP: 128/69 (28 Jul 2020 07:23) (128/69 - 150/74)  BP(mean): --  RR: 20 (28 Jul 2020 07:23) (18 - 20)  SpO2: 97% (28 Jul 2020 08:40) (94% - 99%)    PHYSICAL EXAM:  GENERAL: NAD, obese  HEAD:  Atraumatic, Normocephalic  EYES: EOMI, PERRLA, conjunctiva and sclera clear  ENMT: Moist mucous membranes, Good dentition, No lesions; No tonsillar erythema, exudates, or enlargement   NECK: Supple, No JVD appreciated, Normal thyroid  NERVOUS SYSTEM:  Alert & Oriented X3, Good concentration; No focal deficits  CHEST/LUNG: Clear to auscultation bilaterally; No rales, rhonchi, wheezing, or rubs  HEART: Regular rate and rhythm; No murmurs, rubs, or gallops  ABDOMEN: Soft, Nontender, Nondistended; Bowel sounds present  EXTREMITIES:  2+ Peripheral Pulses, No clubbing or cyanosis  SKIN: Open blister right virgie  I&O's Detail    27 Jul 2020 07:01  -  28 Jul 2020 07:00  --------------------------------------------------------  IN:  Total IN: 0 mL    OUT:    Voided: 700 mL  Total OUT: 700 mL    Total NET: -700 mL                                    13.2   8.19  )-----------( 275      ( 28 Jul 2020 07:36 )             42.8     28 Jul 2020 06:22    139    |  101    |  42.0   ----------------------------<  172    5.8     |  25.0   |  1.60     Ca    9.7        28 Jul 2020 06:22  Mg     2.2       28 Jul 2020 06:22    TPro  6.8    /  Alb  3.4    /  TBili  0.4    /  DBili  x      /  AST  14     /  ALT  18     /  AlkPhos  133    27 Jul 2020 05:26      CAPILLARY BLOOD GLUCOSE      POCT Blood Glucose.: 187 mg/dL (28 Jul 2020 11:47)  POCT Blood Glucose.: 173 mg/dL (28 Jul 2020 07:41)  POCT Blood Glucose.: 209 mg/dL (27 Jul 2020 23:28)  POCT Blood Glucose.: 229 mg/dL (27 Jul 2020 16:49)    LIVER FUNCTIONS - ( 27 Jul 2020 05:26 )  Alb: 3.4 g/dL / Pro: 6.8 g/dL / ALK PHOS: 133 U/L / ALT: 18 U/L / AST: 14 U/L / GGT: x               MEDICATIONS  (STANDING):  aspirin enteric coated 81 milliGRAM(s) Oral daily  atorvastatin 80 milliGRAM(s) Oral at bedtime  budesonide 160 MICROgram(s)/formoterol 4.5 MICROgram(s) Inhaler 2 Puff(s) Inhalation two times a day  cholecalciferol 1000 Unit(s) Oral daily  collagenase Ointment 1 Application(s) Topical daily  dextrose 5%. 1000 milliLiter(s) (50 mL/Hr) IV Continuous <Continuous>  dextrose 50% Injectable 12.5 Gram(s) IV Push once  dextrose 50% Injectable 25 Gram(s) IV Push once  dextrose 50% Injectable 25 Gram(s) IV Push once  DOBUTamine Infusion 10 MICROgram(s)/kG/Min (52.4 mL/Hr) IV Continuous <Continuous>  furosemide   Injectable 40 milliGRAM(s) IV Push daily  heparin   Injectable 7500 Unit(s) SubCutaneous every 8 hours  insulin glargine Injectable (LANTUS) 20 Unit(s) SubCutaneous at bedtime  insulin lispro (HumaLOG) corrective regimen sliding scale   SubCutaneous three times a day before meals  insulin lispro (HumaLOG) corrective regimen sliding scale   SubCutaneous at bedtime  insulin lispro Injectable (HumaLOG) 5 Unit(s) SubCutaneous three times a day before meals  oxyCODONE  ER Tablet 80 milliGRAM(s) Oral every 12 hours  polyethylene glycol 3350 17 Gram(s) Oral daily    MEDICATIONS  (PRN):  ALBUTerol    90 MICROgram(s) HFA Inhaler 2 Puff(s) Inhalation every 6 hours PRN Shortness of Breath and/or Wheezing  clonazePAM  Tablet 0.5 milliGRAM(s) Oral daily PRN Anxiety  dextrose 40% Gel 15 Gram(s) Oral once PRN Blood Glucose LESS THAN 70 milliGRAM(s)/deciliter  glucagon  Injectable 1 milliGRAM(s) IntraMuscular once PRN Glucose LESS THAN 70 milligrams/deciliter  oxycodone    5 mG/acetaminophen 325 mG 1 Tablet(s) Oral every 6 hours PRN Moderate to Severe Pain      RADIOLOGY & ADDITIONAL TESTS:

## 2020-07-28 NOTE — PHYSICAL THERAPY INITIAL EVALUATION ADULT - CRITERIA FOR SKILLED THERAPEUTIC INTERVENTIONS
Home with Assist, Home PT, RW, Transfers only/anticipated equipment needs at discharge/anticipated discharge recommendation/impairments found

## 2020-07-29 ENCOUNTER — TRANSCRIPTION ENCOUNTER (OUTPATIENT)
Age: 65
End: 2020-07-29

## 2020-07-29 VITALS
HEART RATE: 78 BPM | SYSTOLIC BLOOD PRESSURE: 116 MMHG | RESPIRATION RATE: 18 BRPM | DIASTOLIC BLOOD PRESSURE: 59 MMHG | OXYGEN SATURATION: 92 % | TEMPERATURE: 98 F

## 2020-07-29 LAB
GLUCOSE BLDC GLUCOMTR-MCNC: 229 MG/DL — HIGH (ref 70–99)
GLUCOSE BLDC GLUCOMTR-MCNC: 285 MG/DL — HIGH (ref 70–99)

## 2020-07-29 PROCEDURE — 82550 ASSAY OF CK (CPK): CPT

## 2020-07-29 PROCEDURE — 83735 ASSAY OF MAGNESIUM: CPT

## 2020-07-29 PROCEDURE — 80048 BASIC METABOLIC PNL TOTAL CA: CPT

## 2020-07-29 PROCEDURE — 82962 GLUCOSE BLOOD TEST: CPT

## 2020-07-29 PROCEDURE — C8929: CPT

## 2020-07-29 PROCEDURE — 85730 THROMBOPLASTIN TIME PARTIAL: CPT

## 2020-07-29 PROCEDURE — 82803 BLOOD GASES ANY COMBINATION: CPT

## 2020-07-29 PROCEDURE — 71045 X-RAY EXAM CHEST 1 VIEW: CPT

## 2020-07-29 PROCEDURE — 80053 COMPREHEN METABOLIC PANEL: CPT

## 2020-07-29 PROCEDURE — 99238 HOSP IP/OBS DSCHRG MGMT 30/<: CPT

## 2020-07-29 PROCEDURE — 93351 STRESS TTE COMPLETE: CPT

## 2020-07-29 PROCEDURE — 86769 SARS-COV-2 COVID-19 ANTIBODY: CPT

## 2020-07-29 PROCEDURE — U0003: CPT

## 2020-07-29 PROCEDURE — 94760 N-INVAS EAR/PLS OXIMETRY 1: CPT

## 2020-07-29 PROCEDURE — 93005 ELECTROCARDIOGRAM TRACING: CPT

## 2020-07-29 PROCEDURE — 85610 PROTHROMBIN TIME: CPT

## 2020-07-29 PROCEDURE — 93970 EXTREMITY STUDY: CPT

## 2020-07-29 PROCEDURE — 84484 ASSAY OF TROPONIN QUANT: CPT

## 2020-07-29 PROCEDURE — 97163 PT EVAL HIGH COMPLEX 45 MIN: CPT

## 2020-07-29 PROCEDURE — 71250 CT THORAX DX C-: CPT

## 2020-07-29 PROCEDURE — 85027 COMPLETE CBC AUTOMATED: CPT

## 2020-07-29 PROCEDURE — 36415 COLL VENOUS BLD VENIPUNCTURE: CPT

## 2020-07-29 PROCEDURE — 99285 EMERGENCY DEPT VISIT HI MDM: CPT

## 2020-07-29 PROCEDURE — 99232 SBSQ HOSP IP/OBS MODERATE 35: CPT

## 2020-07-29 PROCEDURE — 94640 AIRWAY INHALATION TREATMENT: CPT

## 2020-07-29 PROCEDURE — 83880 ASSAY OF NATRIURETIC PEPTIDE: CPT

## 2020-07-29 RX ORDER — FUROSEMIDE 40 MG
40 TABLET ORAL DAILY
Refills: 0 | Status: DISCONTINUED | OUTPATIENT
Start: 2020-07-29 | End: 2020-07-29

## 2020-07-29 RX ADMIN — Medication 5 UNIT(S): at 08:35

## 2020-07-29 RX ADMIN — Medication 40 MILLIGRAM(S): at 12:29

## 2020-07-29 RX ADMIN — HEPARIN SODIUM 7500 UNIT(S): 5000 INJECTION INTRAVENOUS; SUBCUTANEOUS at 05:11

## 2020-07-29 RX ADMIN — OXYCODONE HYDROCHLORIDE 80 MILLIGRAM(S): 5 TABLET ORAL at 05:59

## 2020-07-29 RX ADMIN — HEPARIN SODIUM 7500 UNIT(S): 5000 INJECTION INTRAVENOUS; SUBCUTANEOUS at 15:37

## 2020-07-29 RX ADMIN — POLYETHYLENE GLYCOL 3350 17 GRAM(S): 17 POWDER, FOR SOLUTION ORAL at 12:23

## 2020-07-29 RX ADMIN — Medication 2: at 12:22

## 2020-07-29 RX ADMIN — Medication 2: at 08:36

## 2020-07-29 RX ADMIN — Medication 40 MILLIGRAM(S): at 05:11

## 2020-07-29 RX ADMIN — BUDESONIDE AND FORMOTEROL FUMARATE DIHYDRATE 2 PUFF(S): 160; 4.5 AEROSOL RESPIRATORY (INHALATION) at 08:37

## 2020-07-29 RX ADMIN — Medication 81 MILLIGRAM(S): at 12:22

## 2020-07-29 RX ADMIN — Medication 5 UNIT(S): at 12:22

## 2020-07-29 RX ADMIN — OXYCODONE HYDROCHLORIDE 80 MILLIGRAM(S): 5 TABLET ORAL at 05:12

## 2020-07-29 NOTE — PROGRESS NOTE ADULT - SUBJECTIVE AND OBJECTIVE BOX
CHIEF COMPLAINT: Patient is a 65y old  Female who presents with a chief complaint of acute hypoxemic respiratory failure, acute decompensated heart failure (28 Jul 2020 16:20)    INTERVAL HISTORY:  pt seen and examined.     Allergies    adhesives (Rash)  latex (Rash)  No Known Drug Allergies  wool- rash, itch (Other)    Intolerances      	  MEDICATIONS:  furosemide   Injectable 40 milliGRAM(s) IV Push daily  ALBUTerol    90 MICROgram(s) HFA Inhaler 2 Puff(s) Inhalation every 6 hours PRN  budesonide 160 MICROgram(s)/formoterol 4.5 MICROgram(s) Inhaler 2 Puff(s) Inhalation two times a day  clonazePAM  Tablet 0.5 milliGRAM(s) Oral daily PRN  oxycodone    5 mG/acetaminophen 325 mG 1 Tablet(s) Oral every 6 hours PRN  oxyCODONE  ER Tablet 80 milliGRAM(s) Oral every 12 hours  polyethylene glycol 3350 17 Gram(s) Oral daily  atorvastatin 80 milliGRAM(s) Oral at bedtime  dextrose 40% Gel 15 Gram(s) Oral once PRN  dextrose 50% Injectable 12.5 Gram(s) IV Push once  dextrose 50% Injectable 25 Gram(s) IV Push once  dextrose 50% Injectable 25 Gram(s) IV Push once  glucagon  Injectable 1 milliGRAM(s) IntraMuscular once PRN  insulin glargine Injectable (LANTUS) 20 Unit(s) SubCutaneous at bedtime  insulin lispro (HumaLOG) corrective regimen sliding scale   SubCutaneous three times a day before meals  insulin lispro (HumaLOG) corrective regimen sliding scale   SubCutaneous at bedtime  insulin lispro Injectable (HumaLOG) 5 Unit(s) SubCutaneous three times a day before meals  aspirin enteric coated 81 milliGRAM(s) Oral daily  cholecalciferol 1000 Unit(s) Oral daily  collagenase Ointment 1 Application(s) Topical daily  dextrose 5%. 1000 milliLiter(s) IV Continuous <Continuous>  heparin   Injectable 7500 Unit(s) SubCutaneous every 8 hours    PHYSICAL EXAM:  T(C): 36.7 (07-29-20 @ 07:20), Max: 36.9 (07-28-20 @ 15:10)  HR: 69 (07-29-20 @ 07:20) (69 - 86)  BP: 136/67 (07-29-20 @ 07:20) (132/64 - 138/70)  RR: 18 (07-29-20 @ 07:20) (18 - 18)  SpO2: 95% (07-29-20 @ 11:40) (87% - 98%)    Appearance: comfortable	  HEENT:   NC/AT  Eye: Pink Conjunctiva  Lungs: poor air entry b/l  CVS: RRR, Normal S1 and S2, 3/6 sm lsb  Pulses: Normal distal pulses.  Neuro: A&O x3    TELEMETRY: no events     LABS:	 	                       13.2   8.19  )-----------( 275      ( 28 Jul 2020 07:36 )             42.8     07-28    137  |  96<L>  |  42.0<H>  ----------------------------<  268<H>  4.8   |  30.0<H>  |  1.86<H>    Ca    9.6      28 Jul 2020 17:21  Mg     2.2     07-28    ASSESSMENT/PLAN: 	  spoke with pt in length.  Stress test negative for ischemia   CT chest result is also not that significant  pt is morbidly obese and is having sleep apnea study.  She is not able to lay flat for right heart cath  advised to see her pulmonologist for pft and sleep eval  cont with po lasix  fu in my office in 1-2 months  Thank you

## 2020-07-29 NOTE — DISCHARGE NOTE PROVIDER - CARE PROVIDERS DIRECT ADDRESSES
,[email or web address]_Unknown,DirectAddress_Unknown,DirectAddress_Unknown [email or web address],DirectAddress_Unknown,DirectAddress_Unknown,DirectAddress_Unknown,[email or web address]

## 2020-07-29 NOTE — DISCHARGE NOTE PROVIDER - HOSPITAL COURSE
Pt is a 65yoF hx morbid obesity, chronic pain syndrome from spinal stenosis on opioids, venous stasis with chronic leg edema, HTN, DM complicated by neuropathy and Charcot foot, CKD, discharged from [facility] for sepsis due to right foot wound infection/cellulitis, presenting with worsening dyspnea and baseline leg swelling with episodes of hypoxia at home and hypoxic in 80s on admission, also with episodes of intermittent chest pain and pt reporting recent abnormal outpatient stress test. Pt admitted for Acute hypoxemic respiratory failure due to new onset acute decompensated CHF. Improved on IV Lasix 40mg daily, followed by Cardiology. Stress test negative for ischemia CT chest result is also not that significant per Cardio team. Since pt with morbid obesity, counciled on weight loss and sleep apnea study since Pt cannot lay flat for RHC. Advised to see her pulmonologist for pft and sleep eval and to continue Oral Lasix and to follow up with cardiology outpt. Assessed for home oxygen. SpO2 87% on ambulation on room air and increases to 95% on 2L NC. Seen by PT, recommends home with home care.         For R-foot wound infection/cellulitis, Leg dopplers negative. Followed by Podiatry inpt. Recommended Weight bearing as tolerated, Offloading to bilateral Heels. Discussed importance of daily foot examinations and proper shoe gear and to importance of lower Fasting Blood Glucose levels. Pt stable from podiatric standpoint, no plan for surgical management.        Vital Signs Last 24 Hrs    T(C): 36.7 (29 Jul 2020 07:20), Max: 36.9 (28 Jul 2020 15:10)    T(F): 98 (29 Jul 2020 07:20), Max: 98.5 (28 Jul 2020 15:10)    HR: 70 (29 Jul 2020 08:30) (69 - 86)    BP: 136/67 (29 Jul 2020 07:20) (132/64 - 138/70)    BP(mean): --    RR: 18 (29 Jul 2020 07:20) (18 - 18)    SpO2: 95% (29 Jul 2020 11:40) (87% - 98%)        PHYSICAL EXAM:    GENERAL: Pleasant female, sitting in bed in NAD    HEAD:  Atraumatic, Normocephalic    EYES: EOMI, PERRLA, conjunctiva and sclera clear    ENMT: Moist mucous membranes, Good dentition, No lesions; No tonsillar erythema, exudates, or enlargement     NECK: Supple, No JVD appreciated, Normal thyroid    NERVOUS SYSTEM:  Alert & Oriented X3, Good concentration; No focal deficits    CHEST/LUNG: Clear to auscultation bilaterally; No rales, rhonchi, wheezing, or rubs    HEART: Regular rate and rhythm; No murmurs, rubs, or gallops    ABDOMEN: Soft, Nontender, obese; Bowel sounds present    EXTREMITIES:  2+ Peripheral Pulses, No clubbing or cyanosis    SKIN: right heel hyperkaratotic ulcer, no fluctuance or streaking. No rashes          Disposition: Stable for discharge. Outpatient followup discussed and to take medications as prescribed.    Discharge planning time 35 minutes.

## 2020-07-29 NOTE — DISCHARGE NOTE PROVIDER - NSDCCPCAREPLAN_GEN_ALL_CORE_FT
PRINCIPAL DISCHARGE DIAGNOSIS  Diagnosis: Acute combined systolic and diastolic congestive heart failure  Assessment and Plan of Treatment: Improved, on lasix. Pt to follow up with PMD, Cardiology and Pulmonology within 1 week      SECONDARY DISCHARGE DIAGNOSES  Diagnosis: Diabetic Charcot foot  Assessment and Plan of Treatment: A1c 7.7. Continue fasting blood sugar checks and follow up with PMD within 1 week. Discussed would care and offloading on heels. To keep feet clean and to wear proper footwear. follow up with PMD, Podiatry within 1 week    Diagnosis: Diabetes mellitus  Assessment and Plan of Treatment: A1c 7.7. Continue fasting blood sugar checks and follow up with PMD within 1 week

## 2020-07-29 NOTE — DISCHARGE NOTE PROVIDER - NSDCHHHOMEBOUNDOTHER_GEN_ALL_CORE_FT
with weakness and delibilty, new to supplemental O2, RLE heel ulcer, weight bearing as tolerated with B/L LE

## 2020-07-29 NOTE — DISCHARGE NOTE PROVIDER - PROVIDER TOKENS
PROVIDER:[TOKEN:[[id number]],FOLLOWUP:[1 week]],FREE:[LAST:[Dr Layton],FIRST:[Pulmonology],PHONE:[(   )    -],FAX:[(   )    -],FOLLOWUP:[1-3 days]],FREE:[LAST:[Dr Harrison],FIRST:[Cardiology],PHONE:[(   )    -],FAX:[(   )    -],FOLLOWUP:[1 week]],PROVIDER:[TOKEN:[[id number]],FOLLOWUP:[1 week]] PROVIDER:[TOKEN:[[id number]],FOLLOWUP:[1 week]],PROVIDER:[TOKEN:[[id number]],FOLLOWUP:[1 week]],FREE:[LAST:[Dr Layton],FIRST:[Pulmonology],PHONE:[(   )    -],FAX:[(   )    -],FOLLOWUP:[1-3 days]],FREE:[LAST:[Dr Harrison],FIRST:[Cardiology],PHONE:[(   )    -],FAX:[(   )    -],FOLLOWUP:[1 week]],PROVIDER:[TOKEN:[[id number]],FOLLOWUP:[1 month]]

## 2020-07-29 NOTE — DISCHARGE NOTE PROVIDER - NSDCFUADDAPPT_GEN_ALL_CORE_FT
YOU HAVE NO ISSUES GETTING YOUR MEDS AT [place] [address] PHONE # [phone]  DR. WILKINS'S OFFICE WILL CALL YOU FOR A FOLLOW UP PULMONARY APPOINTMENT

## 2020-07-29 NOTE — DISCHARGE NOTE PROVIDER - NSDCMRMEDTOKEN_GEN_ALL_CORE_FT
albuterol 90 mcg/inh inhalation aerosol: 2 puff(s) inhaled every 6 hours, As needed, Shortness of Breath and/or Wheezing  aspirin 81 mg oral tablet: 1 tab(s) orally once a day, prophylaxis  budesonide-formoterol 160 mcg-4.5 mcg/inh inhalation aerosol: 2 puff(s) inhaled 2 times a day   collagenase 250 units/g topical ointment: 1 application topically once a day to Right foot.   KlonoPIN 0.5 mg oral tablet: 1 tab(s) orally once a day, As Needed for anxiety MDD:1  Lantus 100 units/mL subcutaneous solution: 20 unit(s) subcutaneous once a day (at bedtime)   Lasix 40 mg oral tablet: 1 tab(s) orally once a day  MiraLax oral powder for reconstitution: 17 gram(s) orally once a day  NovoLOG 100 units/mL subcutaneous solution: 5 unit(s) subcutaneous 3 times a day (before meals)  oxyCODONE 80 mg oral tablet, extended release: 1 tab(s) orally every 12 hours MDD:2  rosuvastatin 20 mg oral tablet: 1 tab(s) orally once a day  Vicodin ES 7.5 mg-300 mg oral tablet: 1 tab(s) orally 3 times a day, As Needed for break trough pain. MDD:3  Vitamin D3 1000 intl units oral capsule: 1 cap(s) orally once a day

## 2020-07-29 NOTE — PROGRESS NOTE ADULT - REASON FOR ADMISSION
acute hypoxemic respiratory failure, acute decompensated heart failure

## 2020-07-29 NOTE — DISCHARGE NOTE PROVIDER - CARE PROVIDER_API CALL
Paula Mccann  INTERNAL MEDICINE  [address] [place] [address]  Phone: [phone]  Fax: [phone]  Follow Up Time: 1 week    Dr Layton, Pulmonology  Phone: (   )    -  Fax: (   )    -  Follow Up Time: 1-3 days    Dr Harrison, Cardiology  Phone: (   )    -  Fax: (   )    -  Follow Up Time: 1 week    Paco Mitchell  PODIATRIC MEDICINE AND SURGERY  [address] [place] [address]  Phone: [phone]  Fax: [phone]  Follow Up Time: 1 week Paula Mccann  INTERNAL MEDICINE  [address] [place] [address]  Phone: [phone]  Fax: [phone]  Follow Up Time: 1 week    Paco Mitchell  PODIATRIC MEDICINE AND SURGERY  [address] [place] [address]  Phone: [phone]  Fax: [phone]  Follow Up Time: 1 week    Dr Layton, Pulmonology  Phone: (   )    -  Fax: (   )    -  Follow Up Time: 1-3 days    Dr Harrison, Cardiology  Phone: (   )    -  Fax: (   )    -  Follow Up Time: 1 week    Hitesh Horton  CARDIOVASCULAR DISEASE  [address] [place] [id number]  Phone: [phone]  Fax: [phone]  Follow Up Time: 1 month

## 2020-08-07 ENCOUNTER — RX RENEWAL (OUTPATIENT)
Age: 65
End: 2020-08-07

## 2020-08-11 ENCOUNTER — APPOINTMENT (OUTPATIENT)
Dept: INTERNAL MEDICINE | Facility: CLINIC | Age: 65
End: 2020-08-11
Payer: MEDICARE

## 2020-08-11 PROCEDURE — 99495 TRANSJ CARE MGMT MOD F2F 14D: CPT | Mod: 95

## 2020-08-11 RX ORDER — COLLAGENASE SANTYL 250 [ARB'U]/G
250 OINTMENT TOPICAL DAILY
Qty: 1 | Refills: 5 | Status: ACTIVE | COMMUNITY
Start: 2020-08-11 | End: 1900-01-01

## 2020-08-12 RX ORDER — AMOXICILLIN AND CLAVULANATE POTASSIUM 875; 125 MG/1; MG/1
875-125 TABLET, COATED ORAL
Qty: 10 | Refills: 0 | Status: DISCONTINUED | COMMUNITY
Start: 2020-07-23 | End: 2020-08-12

## 2020-08-12 RX ORDER — LEVOFLOXACIN 500 MG/1
500 TABLET, FILM COATED ORAL DAILY
Qty: 10 | Refills: 0 | Status: DISCONTINUED | COMMUNITY
Start: 2020-04-24 | End: 2020-08-12

## 2020-08-12 RX ORDER — INSULIN DETEMIR 100 [IU]/ML
100 INJECTION, SOLUTION SUBCUTANEOUS
Qty: 10 | Refills: 0 | Status: DISCONTINUED | COMMUNITY
Start: 2020-07-23 | End: 2020-08-12

## 2020-08-12 RX ORDER — HUMAN INSULIN 100 [IU]/ML
100 INJECTION, SUSPENSION SUBCUTANEOUS
Qty: 5 | Refills: 3 | Status: DISCONTINUED | COMMUNITY
Start: 2020-04-04 | End: 2020-08-12

## 2020-08-12 NOTE — PHYSICAL EXAM
[Well Nourished] : well nourished [No Acute Distress] : no acute distress [Well Developed] : well developed [Well-Appearing] : well-appearing [No Respiratory Distress] : no respiratory distress  [Normal Affect] : the affect was normal [Alert and Oriented x3] : oriented to person, place, and time [01301 - Moderate Complexity requires multiple possible diagnoses and/or the management options, moderate complexity of the medical data (tests, etc.) to be reviewed, and moderate risk of significant complications, morbidity, and/or mortality as well as co] : Moderate Complexity [de-identified] : Wearing O2 via nasal cannula

## 2020-08-12 NOTE — HISTORY OF PRESENT ILLNESS
[Admitted on: ___] : The patient was admitted on [unfilled] [Post-hospitalization from ___ Hospital] : Post-hospitalization from [unfilled] Hospital [Discharged on ___] : discharged on [unfilled] [Discharge Summary] : discharge summary [Med Reconciliation] : medication reconciliation has been completed [Patient Contacted By: ____] : and contacted by [unfilled] [FreeTextEntry2] : The patient is a 65-year-old female with a history of diabetes, hemiplegia, hypertension, hyperlipidemia, morbid obesity, dyspnea on exertion who underwent 2 recent admissions.  The first happened after her right foot became infected.  She had a big black [place] on the bottom of her foot which ruptured while in the shower.  She had cold around that time complaining of UTI symptoms but her culture was negative and I was concerned about foot lesions and asked her about them and she said she had none.  She really is not able to look well enough at her feet.  She said there was a large piece of skin that broke off on the bottom sole of her foot and it was oozing quite a bit of blood.  She immediately went to the hospital and while there it was found that she was septic with a fever up to 104 degrees.  She had some chest pain at that time and was treated with vancomycin and piperacillin.\par \par She also has noticed that her left foot is slightly swollen and the color is somewhat poor.  Thinks she stubbed her toe on a saddle.  Has bad neuropathy and in the hospital they noticed her pulse was decreased of the left side.  Question was raised of the need for vascular surgery and possible amputation but vascular surgery was never called during the hospitalization.  Her O2 level was low in the hospital and she was sent home on no oxygen.  The next day she developed increasing shortness of breath and a real bad chest pain that was deep inside and more like a pressure.  She was lightheaded and her pulse ox went down as low as the 70s and her son called 911 and she was taken to [facility].  Admission there she was found to be in CHF.  She underwent nuclear stress test and echo which did not show any ischemia and she had a good ejection fraction but was found to have pulmonary hypertension.  She was sent home on O2.  She has not scheduled cardiology follow-up as yet as she was advised that she should have a cardiac catheterization.\par \par Since discharge she has had wound care coming to the house and is using a collagenase cream which she says she is responding to well.  She has also had podiatry come to the house.  No complaints of fever at this time.  She needs refills of her OxyContin as well as the collagenase cream.\par \par In terms of her diabetes, she was sent home on Levemir and NovoLog.  She has been using and NPH insulin and is concerned about the change in her insulin.  She has been waking up with fingersticks in the 1 4150 range and has had no hypoglycemia.  Has not been checking 2-hour postprandial to see if what she is taking with meals is enough.\par \par She is using the O2 at home.  She was to have scheduled a sleep study but it was canceled with COVID.

## 2020-08-12 NOTE — ASSESSMENT
[FreeTextEntry1] : 1.  CHF - Heart failure with reserved ejection fracture.  She also has significant pulmonary hypertension - was advised of the need for a cardiac cath to better delineate the etiology of her symptoms.  She has also seen pulmonary and was to have had a sleep study but COVID resulted in cancellation.  She was advised to schedule an appointment with cardiolgoy and reschedule the sleep study.  Suspect that she is [place]wickian.\par 2.  Foot ulcer being managed by podiatry and wound care.  I renewed her Santyl.  I was unable to visualize the wounds.  ?of PAD in other foot - referred to vascular surgery.  She had a doppler in 2018 that was limited but did not show any significant disease in the lower extremities but did show small vessel disease in the feet. \par 3.  DM - Explained rationale for changing the insulin.  She will increase the levemir to 22-24 units.  Advised her to check 2 hour post prandial FS to adjust her mealtime insulin.  Will have diabetes educator reach out to help with mealtime dose adjustments.\par 4.  F/U with me in 1 month

## 2020-08-14 ENCOUNTER — APPOINTMENT (OUTPATIENT)
Age: 65
End: 2020-08-14

## 2020-08-19 ENCOUNTER — RX RENEWAL (OUTPATIENT)
Age: 65
End: 2020-08-19

## 2020-08-23 ENCOUNTER — RX RENEWAL (OUTPATIENT)
Age: 65
End: 2020-08-23

## 2020-09-09 DIAGNOSIS — Z01.818 ENCOUNTER FOR OTHER PREPROCEDURAL EXAMINATION: ICD-10-CM

## 2020-09-11 ENCOUNTER — APPOINTMENT (OUTPATIENT)
Dept: DISASTER EMERGENCY | Facility: CLINIC | Age: 65
End: 2020-09-11

## 2020-09-15 ENCOUNTER — APPOINTMENT (OUTPATIENT)
Dept: SLEEP CENTER | Facility: CLINIC | Age: 65
End: 2020-09-15

## 2020-09-15 ENCOUNTER — APPOINTMENT (OUTPATIENT)
Dept: INTERNAL MEDICINE | Facility: CLINIC | Age: 65
End: 2020-09-15
Payer: MEDICARE

## 2020-09-15 PROCEDURE — 99443: CPT

## 2020-09-22 ENCOUNTER — APPOINTMENT (OUTPATIENT)
Dept: INTERNAL MEDICINE | Facility: CLINIC | Age: 65
End: 2020-09-22
Payer: MEDICARE

## 2020-09-22 DIAGNOSIS — E11.622 TYPE 2 DIABETES MELLITUS WITH OTHER SKIN ULCER: ICD-10-CM

## 2020-09-22 DIAGNOSIS — L97.909 TYPE 2 DIABETES MELLITUS WITH OTHER SKIN ULCER: ICD-10-CM

## 2020-09-22 PROCEDURE — 99443: CPT

## 2020-09-29 ENCOUNTER — APPOINTMENT (OUTPATIENT)
Dept: INTERNAL MEDICINE | Facility: CLINIC | Age: 65
End: 2020-09-29
Payer: MEDICARE

## 2020-09-29 PROCEDURE — 99441: CPT

## 2020-10-01 ENCOUNTER — FORM ENCOUNTER (OUTPATIENT)
Age: 65
End: 2020-10-01

## 2020-10-07 RX ORDER — AMOXICILLIN AND CLAVULANATE POTASSIUM 875; 125 MG/1; MG/1
875-125 TABLET, COATED ORAL TWICE DAILY
Qty: 28 | Refills: 0 | Status: COMPLETED | COMMUNITY
Start: 2020-10-07 | End: 2020-10-21

## 2020-10-20 ENCOUNTER — APPOINTMENT (OUTPATIENT)
Dept: CARDIOLOGY | Facility: CLINIC | Age: 65
End: 2020-10-20

## 2020-10-22 ENCOUNTER — NON-APPOINTMENT (OUTPATIENT)
Age: 65
End: 2020-10-22

## 2020-10-26 ENCOUNTER — NON-APPOINTMENT (OUTPATIENT)
Age: 65
End: 2020-10-26

## 2020-11-05 ENCOUNTER — NON-APPOINTMENT (OUTPATIENT)
Age: 65
End: 2020-11-05

## 2020-11-09 ENCOUNTER — INPATIENT (INPATIENT)
Facility: HOSPITAL | Age: 65
LOS: 15 days | Discharge: HOME | DRG: 629 | End: 2020-11-25
Attending: HOSPITALIST | Admitting: HOSPITALIST
Payer: MEDICARE

## 2020-11-09 ENCOUNTER — NON-APPOINTMENT (OUTPATIENT)
Age: 65
End: 2020-11-09

## 2020-11-09 VITALS
HEIGHT: 63 IN | HEART RATE: 91 BPM | WEIGHT: 293 LBS | TEMPERATURE: 98 F | RESPIRATION RATE: 17 BRPM | OXYGEN SATURATION: 97 % | SYSTOLIC BLOOD PRESSURE: 167 MMHG | DIASTOLIC BLOOD PRESSURE: 74 MMHG

## 2020-11-09 DIAGNOSIS — I50.32 CHRONIC DIASTOLIC (CONGESTIVE) HEART FAILURE: ICD-10-CM

## 2020-11-09 DIAGNOSIS — E11.628 TYPE 2 DIABETES MELLITUS WITH OTHER SKIN COMPLICATIONS: ICD-10-CM

## 2020-11-09 DIAGNOSIS — M54.5 LOW BACK PAIN: ICD-10-CM

## 2020-11-09 DIAGNOSIS — Z90.49 ACQUIRED ABSENCE OF OTHER SPECIFIED PARTS OF DIGESTIVE TRACT: Chronic | ICD-10-CM

## 2020-11-09 DIAGNOSIS — R09.02 HYPOXEMIA: ICD-10-CM

## 2020-11-09 DIAGNOSIS — I27.20 PULMONARY HYPERTENSION, UNSPECIFIED: ICD-10-CM

## 2020-11-09 DIAGNOSIS — Z29.9 ENCOUNTER FOR PROPHYLACTIC MEASURES, UNSPECIFIED: ICD-10-CM

## 2020-11-09 DIAGNOSIS — Z90.710 ACQUIRED ABSENCE OF BOTH CERVIX AND UTERUS: Chronic | ICD-10-CM

## 2020-11-09 DIAGNOSIS — Z98.890 OTHER SPECIFIED POSTPROCEDURAL STATES: Chronic | ICD-10-CM

## 2020-11-09 DIAGNOSIS — J45.909 UNSPECIFIED ASTHMA, UNCOMPLICATED: ICD-10-CM

## 2020-11-09 DIAGNOSIS — N18.30 CHRONIC KIDNEY DISEASE, STAGE 3 UNSPECIFIED: ICD-10-CM

## 2020-11-09 DIAGNOSIS — L08.9 LOCAL INFECTION OF THE SKIN AND SUBCUTANEOUS TISSUE, UNSPECIFIED: ICD-10-CM

## 2020-11-09 DIAGNOSIS — E11.621 TYPE 2 DIABETES MELLITUS WITH FOOT ULCER: ICD-10-CM

## 2020-11-09 LAB
ALBUMIN SERPL ELPH-MCNC: 4.3 G/DL — SIGNIFICANT CHANGE UP (ref 3.3–5)
ALP SERPL-CCNC: 96 U/L — SIGNIFICANT CHANGE UP (ref 40–120)
ALT FLD-CCNC: 16 U/L — SIGNIFICANT CHANGE UP (ref 10–45)
ANION GAP SERPL CALC-SCNC: 12 MMOL/L — SIGNIFICANT CHANGE UP (ref 5–17)
APTT BLD: 27.7 SEC — SIGNIFICANT CHANGE UP (ref 27.5–35.5)
AST SERPL-CCNC: 16 U/L — SIGNIFICANT CHANGE UP (ref 10–40)
BASOPHILS # BLD AUTO: 0.07 K/UL — SIGNIFICANT CHANGE UP (ref 0–0.2)
BASOPHILS NFR BLD AUTO: 0.7 % — SIGNIFICANT CHANGE UP (ref 0–2)
BILIRUB SERPL-MCNC: 0.6 MG/DL — SIGNIFICANT CHANGE UP (ref 0.2–1.2)
BUN SERPL-MCNC: 28 MG/DL — HIGH (ref 7–23)
CALCIUM SERPL-MCNC: 10 MG/DL — SIGNIFICANT CHANGE UP (ref 8.4–10.5)
CHLORIDE SERPL-SCNC: 99 MMOL/L — SIGNIFICANT CHANGE UP (ref 96–108)
CO2 SERPL-SCNC: 27 MMOL/L — SIGNIFICANT CHANGE UP (ref 22–31)
CREAT SERPL-MCNC: 1.48 MG/DL — HIGH (ref 0.5–1.3)
CRP SERPL-MCNC: 1.65 MG/DL — HIGH (ref 0–0.4)
EOSINOPHIL # BLD AUTO: 0.18 K/UL — SIGNIFICANT CHANGE UP (ref 0–0.5)
EOSINOPHIL NFR BLD AUTO: 1.8 % — SIGNIFICANT CHANGE UP (ref 0–6)
GAS PNL BLDV: SIGNIFICANT CHANGE UP
GLUCOSE BLDC GLUCOMTR-MCNC: 268 MG/DL — HIGH (ref 70–99)
GLUCOSE SERPL-MCNC: 187 MG/DL — HIGH (ref 70–99)
HCT VFR BLD CALC: 44.7 % — SIGNIFICANT CHANGE UP (ref 34.5–45)
HGB BLD-MCNC: 14.1 G/DL — SIGNIFICANT CHANGE UP (ref 11.5–15.5)
IMM GRANULOCYTES NFR BLD AUTO: 0.8 % — SIGNIFICANT CHANGE UP (ref 0–1.5)
INR BLD: 0.97 RATIO — SIGNIFICANT CHANGE UP (ref 0.88–1.16)
LYMPHOCYTES # BLD AUTO: 2.1 K/UL — SIGNIFICANT CHANGE UP (ref 1–3.3)
LYMPHOCYTES # BLD AUTO: 20.6 % — SIGNIFICANT CHANGE UP (ref 13–44)
MCHC RBC-ENTMCNC: 29.3 PG — SIGNIFICANT CHANGE UP (ref 27–34)
MCHC RBC-ENTMCNC: 31.5 GM/DL — LOW (ref 32–36)
MCV RBC AUTO: 92.7 FL — SIGNIFICANT CHANGE UP (ref 80–100)
MONOCYTES # BLD AUTO: 0.85 K/UL — SIGNIFICANT CHANGE UP (ref 0–0.9)
MONOCYTES NFR BLD AUTO: 8.4 % — SIGNIFICANT CHANGE UP (ref 2–14)
NEUTROPHILS # BLD AUTO: 6.89 K/UL — SIGNIFICANT CHANGE UP (ref 1.8–7.4)
NEUTROPHILS NFR BLD AUTO: 67.7 % — SIGNIFICANT CHANGE UP (ref 43–77)
NRBC # BLD: 0 /100 WBCS — SIGNIFICANT CHANGE UP (ref 0–0)
PLATELET # BLD AUTO: 227 K/UL — SIGNIFICANT CHANGE UP (ref 150–400)
POTASSIUM SERPL-MCNC: 4.4 MMOL/L — SIGNIFICANT CHANGE UP (ref 3.5–5.3)
POTASSIUM SERPL-SCNC: 4.4 MMOL/L — SIGNIFICANT CHANGE UP (ref 3.5–5.3)
PROT SERPL-MCNC: 7.1 G/DL — SIGNIFICANT CHANGE UP (ref 6–8.3)
PROTHROM AB SERPL-ACNC: 11.7 SEC — SIGNIFICANT CHANGE UP (ref 10.6–13.6)
RBC # BLD: 4.82 M/UL — SIGNIFICANT CHANGE UP (ref 3.8–5.2)
RBC # FLD: 13.6 % — SIGNIFICANT CHANGE UP (ref 10.3–14.5)
SODIUM SERPL-SCNC: 138 MMOL/L — SIGNIFICANT CHANGE UP (ref 135–145)
WBC # BLD: 10.17 K/UL — SIGNIFICANT CHANGE UP (ref 3.8–10.5)
WBC # FLD AUTO: 10.17 K/UL — SIGNIFICANT CHANGE UP (ref 3.8–10.5)

## 2020-11-09 PROCEDURE — 93010 ELECTROCARDIOGRAM REPORT: CPT

## 2020-11-09 PROCEDURE — 73630 X-RAY EXAM OF FOOT: CPT | Mod: 26,RT

## 2020-11-09 PROCEDURE — 99285 EMERGENCY DEPT VISIT HI MDM: CPT

## 2020-11-09 PROCEDURE — 93971 EXTREMITY STUDY: CPT | Mod: 26,RT

## 2020-11-09 PROCEDURE — 99223 1ST HOSP IP/OBS HIGH 75: CPT | Mod: GC

## 2020-11-09 RX ORDER — VANCOMYCIN HCL 1 G
1000 VIAL (EA) INTRAVENOUS ONCE
Refills: 0 | Status: COMPLETED | OUTPATIENT
Start: 2020-11-09 | End: 2020-11-09

## 2020-11-09 RX ORDER — SODIUM CHLORIDE 9 MG/ML
1000 INJECTION, SOLUTION INTRAVENOUS
Refills: 0 | Status: DISCONTINUED | OUTPATIENT
Start: 2020-11-09 | End: 2020-11-25

## 2020-11-09 RX ORDER — ATORVASTATIN CALCIUM 80 MG/1
80 TABLET, FILM COATED ORAL AT BEDTIME
Refills: 0 | Status: DISCONTINUED | OUTPATIENT
Start: 2020-11-09 | End: 2020-11-25

## 2020-11-09 RX ORDER — GLUCAGON INJECTION, SOLUTION 0.5 MG/.1ML
1 INJECTION, SOLUTION SUBCUTANEOUS ONCE
Refills: 0 | Status: DISCONTINUED | OUTPATIENT
Start: 2020-11-09 | End: 2020-11-25

## 2020-11-09 RX ORDER — INSULIN GLARGINE 100 [IU]/ML
72 INJECTION, SOLUTION SUBCUTANEOUS AT BEDTIME
Refills: 0 | Status: DISCONTINUED | OUTPATIENT
Start: 2020-11-09 | End: 2020-11-12

## 2020-11-09 RX ORDER — INSULIN LISPRO 100/ML
15 VIAL (ML) SUBCUTANEOUS
Refills: 0 | Status: DISCONTINUED | OUTPATIENT
Start: 2020-11-09 | End: 2020-11-12

## 2020-11-09 RX ORDER — DEXTROSE 50 % IN WATER 50 %
25 SYRINGE (ML) INTRAVENOUS ONCE
Refills: 0 | Status: DISCONTINUED | OUTPATIENT
Start: 2020-11-09 | End: 2020-11-25

## 2020-11-09 RX ORDER — CHOLECALCIFEROL (VITAMIN D3) 125 MCG
1 CAPSULE ORAL
Qty: 0 | Refills: 0 | DISCHARGE

## 2020-11-09 RX ORDER — ENOXAPARIN SODIUM 100 MG/ML
40 INJECTION SUBCUTANEOUS DAILY
Refills: 0 | Status: DISCONTINUED | OUTPATIENT
Start: 2020-11-09 | End: 2020-11-11

## 2020-11-09 RX ORDER — DEXTROSE 50 % IN WATER 50 %
12.5 SYRINGE (ML) INTRAVENOUS ONCE
Refills: 0 | Status: DISCONTINUED | OUTPATIENT
Start: 2020-11-09 | End: 2020-11-25

## 2020-11-09 RX ORDER — OXYCODONE HYDROCHLORIDE 5 MG/1
80 TABLET ORAL ONCE
Refills: 0 | Status: DISCONTINUED | OUTPATIENT
Start: 2020-11-09 | End: 2020-11-09

## 2020-11-09 RX ORDER — FUROSEMIDE 40 MG
1 TABLET ORAL
Qty: 0 | Refills: 0 | DISCHARGE

## 2020-11-09 RX ORDER — DEXTROSE 50 % IN WATER 50 %
15 SYRINGE (ML) INTRAVENOUS ONCE
Refills: 0 | Status: DISCONTINUED | OUTPATIENT
Start: 2020-11-09 | End: 2020-11-25

## 2020-11-09 RX ORDER — OXYCODONE HYDROCHLORIDE 5 MG/1
80 TABLET ORAL EVERY 12 HOURS
Refills: 0 | Status: DISCONTINUED | OUTPATIENT
Start: 2020-11-10 | End: 2020-11-17

## 2020-11-09 RX ORDER — VANCOMYCIN HCL 1 G
2000 VIAL (EA) INTRAVENOUS EVERY 12 HOURS
Refills: 0 | Status: DISCONTINUED | OUTPATIENT
Start: 2020-11-10 | End: 2020-11-11

## 2020-11-09 RX ORDER — PIPERACILLIN AND TAZOBACTAM 4; .5 G/20ML; G/20ML
3.38 INJECTION, POWDER, LYOPHILIZED, FOR SOLUTION INTRAVENOUS EVERY 8 HOURS
Refills: 0 | Status: DISCONTINUED | OUTPATIENT
Start: 2020-11-10 | End: 2020-11-13

## 2020-11-09 RX ORDER — IPRATROPIUM/ALBUTEROL SULFATE 18-103MCG
3 AEROSOL WITH ADAPTER (GRAM) INHALATION EVERY 6 HOURS
Refills: 0 | Status: DISCONTINUED | OUTPATIENT
Start: 2020-11-09 | End: 2020-11-25

## 2020-11-09 RX ORDER — INSULIN LISPRO 100/ML
VIAL (ML) SUBCUTANEOUS
Refills: 0 | Status: DISCONTINUED | OUTPATIENT
Start: 2020-11-09 | End: 2020-11-15

## 2020-11-09 RX ORDER — CLONAZEPAM 1 MG
0.5 TABLET ORAL AT BEDTIME
Refills: 0 | Status: DISCONTINUED | OUTPATIENT
Start: 2020-11-09 | End: 2020-11-16

## 2020-11-09 RX ORDER — ROSUVASTATIN CALCIUM 5 MG/1
1 TABLET ORAL
Qty: 0 | Refills: 0 | DISCHARGE

## 2020-11-09 RX ORDER — INSULIN GLARGINE 100 [IU]/ML
72 INJECTION, SOLUTION SUBCUTANEOUS EVERY MORNING
Refills: 0 | Status: DISCONTINUED | OUTPATIENT
Start: 2020-11-09 | End: 2020-11-13

## 2020-11-09 RX ORDER — INSULIN LISPRO 100/ML
VIAL (ML) SUBCUTANEOUS AT BEDTIME
Refills: 0 | Status: DISCONTINUED | OUTPATIENT
Start: 2020-11-09 | End: 2020-11-15

## 2020-11-09 RX ORDER — INFLUENZA VIRUS VACCINE 15; 15; 15; 15 UG/.5ML; UG/.5ML; UG/.5ML; UG/.5ML
0.5 SUSPENSION INTRAMUSCULAR ONCE
Refills: 0 | Status: COMPLETED | OUTPATIENT
Start: 2020-11-09 | End: 2020-11-15

## 2020-11-09 RX ORDER — ASPIRIN/CALCIUM CARB/MAGNESIUM 324 MG
81 TABLET ORAL DAILY
Refills: 0 | Status: DISCONTINUED | OUTPATIENT
Start: 2020-11-10 | End: 2020-11-25

## 2020-11-09 RX ORDER — PIPERACILLIN AND TAZOBACTAM 4; .5 G/20ML; G/20ML
3.38 INJECTION, POWDER, LYOPHILIZED, FOR SOLUTION INTRAVENOUS ONCE
Refills: 0 | Status: COMPLETED | OUTPATIENT
Start: 2020-11-09 | End: 2020-11-09

## 2020-11-09 RX ADMIN — Medication 250 MILLIGRAM(S): at 19:03

## 2020-11-09 RX ADMIN — INSULIN GLARGINE 72 UNIT(S): 100 INJECTION, SOLUTION SUBCUTANEOUS at 23:33

## 2020-11-09 RX ADMIN — Medication 250 MILLIGRAM(S): at 23:42

## 2020-11-09 RX ADMIN — PIPERACILLIN AND TAZOBACTAM 200 GRAM(S): 4; .5 INJECTION, POWDER, LYOPHILIZED, FOR SOLUTION INTRAVENOUS at 17:40

## 2020-11-09 RX ADMIN — OXYCODONE HYDROCHLORIDE 80 MILLIGRAM(S): 5 TABLET ORAL at 19:03

## 2020-11-09 RX ADMIN — Medication 1: at 23:31

## 2020-11-09 RX ADMIN — Medication 3 MILLILITER(S): at 19:03

## 2020-11-09 RX ADMIN — OXYCODONE HYDROCHLORIDE 80 MILLIGRAM(S): 5 TABLET ORAL at 19:33

## 2020-11-09 RX ADMIN — Medication 3 MILLILITER(S): at 23:42

## 2020-11-09 NOTE — H&P ADULT - NSHPSOCIALHISTORY_GEN_ALL_CORE
Tobacco - Former smoker  Alcohol - Denies  Drugs - Denies    Ambulates with scooter, was advised not to bear weight on foot

## 2020-11-09 NOTE — H&P ADULT - NSHPPHYSICALEXAM_GEN_ALL_CORE
General: Awake, alert, in no acute distress, morbidly obese  HEENT: Normocephalic, atraumatic. EOMI. Moist mucus membranes. Nasal cannula in place.  Pulmonary: Symmetric chest rise. No extra work of breathing. Lung exam limited by patient's body habitus. No appreciable wheezing or crackles.  Cardiovascular: Normal rate and regular rhythm. No murmurs/rubs/gallops. Palpable extremity pulses. Bilateral lower extremity edema.  Abdominal: Obese, soft, non-distended, non-tender  Skin: Right lower foot dressed in gauze and kerlix, no soak through. Left foot with no open wounds, erythema around lower foot above ankle with crusted wounds  Neurologic: Moving all extremities. Diminished sensation in lower extremities up to level of mid shin bilaterally.  Psychiatric: Mood appropriate

## 2020-11-09 NOTE — ED PROVIDER NOTE - ATTENDING CONTRIBUTION TO CARE
Ziegler DO: I have personally performed a face to face medical and diagnostic evaluation of the patient. I have discussed with and reviewed the resident's note and agree with the History, ROS, Physical Exam and MDM unless otherwise indicated. A brief summary of my personal evaluation and impression can be found below.    64 y/o female hx morbid obesity, chronic pain syndrome from spinal stenosis on opioids, venous stasis with chronic leg edema, HTN, DM complicated by neuropathy and Charcot foot, CKD, chronic 4L O2 use, presents for right foot swelling/ erythema worsening x 2 days. Patient has a wound on the bottom of that foot that is being followed by podiatry, noticed a few weeks ago that wound was draining and finished a course of augmentin with improvement. Now no purulence/ drainage but significant pain, warmth and redness of foot. No fevers but notes chills. No new sob. States she has some sob at baseline. No chest pain. right foot with swelling, and warmth, erythema that is blanching and well demarcated, apparent 2cm white eschar on sole of that foot without purulence or crepitus. Will empirically tx for cellulitis and admit for IV abx, will obtain duplex to rule out dvt. Redose home meds. Labs and reassess.

## 2020-11-09 NOTE — CHART NOTE - NSCHARTNOTEFT_GEN_A_CORE
Confidential Drug Utilization Report  Search Terms: Yanet Perez, 1955Search Date: 11/09/2020 18:50:16 PM  Searching on behalf of: [facility]  The Drug Utilization Report below displays all of the controlled substance prescriptions, if any, that your patient has filled in the last twelve months. The information displayed on this report is compiled from pharmacy submissions to the Department, and accurately reflects the information as submitted by the pharmacies.    This report was requested by: Tarun Griggs | Reference #: [id number]    Others' Prescriptions  Patient Name: Yanet PerezBirth Date: 1955  Address: [address] [place], [place] [address]Sex: Female  Rx Written	Rx Dispensed	Drug	Quantity	Days Supply	Prescriber Name	Payment Method	Dispenser  10/19/2020	10/21/2020	hydrocodone-acetaminophen 7.5-325 mg tablet	90	30	ZolPaula elise MD	Insurance	[id number]  10/19/2020	10/21/2020	clonazepam 0.5 mg tablet	30	30	ZolPaula elise MD	Insurance	[id number]  10/06/2020	10/12/2020	oxycontin er 80 mg tablet	50	30	ZolliPaula MD	Insurance	[id number]  10/06/2020	10/10/2020	oxycontin er 80 mg tablet	10	5	ZolPaula elise MD	Insurance	[id number]  09/22/2020	09/22/2020	hydrocodone-acetaminophen 7.5-325 mg tablet	90	30	ZolPaula elise MD	Insurance	[id number]  09/22/2020	09/22/2020	clonazepam 0.5 mg tablet	30	30	ZolPaula elise MD	Insurance	[id number]  09/09/2020	09/11/2020	oxycontin er 80 mg tablet	60	30	ZolPaula elise MD	Insurance	[id number]  08/24/2020	08/24/2020	clonazepam 0.5 mg tablet	30	30	ZolliPaula MD	Insurance	[id number]  08/24/2020	08/24/2020	hydrocodone-acetaminophen 7.5-325 mg tablet	90	30	ZolPaula elise MD	Insurance	[id number]  08/12/2020	08/13/2020	oxycontin er 80 mg tablet	60	30	Paula Mccann MD	Insurance	[id number]  07/20/2020	07/24/2020	hydrocodone-acetaminophen 7.5-325 mg tablet	90	30	Paula Mccann MD	Medicare	[id number]  07/20/2020	07/24/2020	clonazepam 0.5 mg tablet	30	30	Paula Mccann MD	Medicare	[id number]  07/09/2020	07/15/2020	oxycontin er 80 mg tablet	10	5	ZolPaula elise MD	Insurance	[id number]  06/23/2020	06/25/2020	hydrocodone-acetaminophen 7.5-325 mg tablet	90	30	Tatum Matt)	Insurance	[id number]  06/23/2020	06/25/2020	clonazepam 0.5 mg tablet	30	30	Tatum Matt)	Insurance	[id number]  06/10/2020	06/16/2020	oxycontin er 80 mg tablet	60	30	Paula Mccann MD	Insurance	[id number]  05/27/2020	05/27/2020	clonazepam 0.5 mg tablet	30	30	ZolPaula elise MD	Insurance	[id number]  05/27/2020	05/27/2020	hydrocodone-acetaminophen 7.5-325 mg tablet	90	30	Paula Mccann MD	Insurance	[id number]  05/12/2020	05/18/2020	oxycontin er 80 mg tablet	60	30	Paula Mccann MD	Insurance	[id number]  04/24/2020	05/02/2020	hydrocodone-acetaminophen 7.5-325 mg tablet	69	23	Paula Mccann MD	Medicare	[id number]  04/24/2020	04/24/2020	hydrocodone-acetaminophen 7.5-325 mg tablet	21	7	Paula Mccann MD	Medicare	[id number]  04/24/2020	04/24/2020	clonazepam 0.5 mg tablet	30	30	Paula Mccann MD	Medicare	[id number]  04/13/2020	04/20/2020	oxycontin er 80 mg tablet	50	30	Paula Mccann MD	Insurance	[id number]  04/13/2020	04/19/2020	oxycontin er 80 mg tablet	10	5	ZolliPaula MD	Insurance	[id number]  03/27/2020	04/02/2020	hydrocodone-acetaminophen 7.5-325 mg tablet	90	30	Zolli, Paula Champion MD	Insurance	[id number]  03/27/2020	04/02/2020	clonazepam 0.5 mg tablet	30	30	ZolliPaula MD	Insurance	[id number]  03/16/2020	03/21/2020	oxycontin er 80 mg tablet	60	30	ZolliPaula MD	Insurance	[id number]  02/28/2020	03/04/2020	hydrocodone-acetaminophen 7.5-325 mg tablet	90	30	Zolli, Paula Champion MD	Insurance	[id number]  02/28/2020	03/04/2020	clonazepam 0.5 mg tablet	30	30	ZolliPaula MD	Insurance	[id number]  02/20/2020	02/20/2020	oxycontin er 80 mg tablet	60	30	ZolliPaula MD	Insurance	[id number]  02/03/2020	02/04/2020	clonazepam 0.5 mg tablet	30	30	ZolliPaula MD	Insurance	[id number]  02/03/2020	02/04/2020	hydrocodone-acetaminophen 7.5-325 mg tablet	90	30	ZolliPaula MD	Insurance	[id number]  01/21/2020	01/22/2020	oxycontin er 80 mg tablet	60	30	ZolliPaula MD	Insurance	[id number]  01/03/2020	01/04/2020	hydrocodone-acetaminophen 7.5-325 mg tablet	90	30	ZolliPaula MD	Insurance	[id number]  01/03/2020	01/04/2020	clonazepam 0.5 mg tablet	30	30	ZolliPaula MD	Insurance	[id number]  12/24/2019	12/24/2019	oxycontin er 80 mg tablet	60	30	Kat Torres MD	Insurance	[id number]  11/22/2019	12/05/2019	hydrocodone-acetaminophen 7.5-325 mg tablet	90	30	ZolliPaula MD	[id number]  11/22/2019	12/05/2019	clonazepam 0.5 mg tablet	30	30	Paula Mccann MD	[id number]  11/22/2019	11/26/2019	oxycontin er 80 mg tablet	60	30	Paula Mccann MD	[id number]  * - Drugs marked with an asterisk are compound drugs. If the compound drug is made up of more than one controlled substance, then each controlled substance will be a separate row in the table.

## 2020-11-09 NOTE — H&P ADULT - ATTENDING COMMENTS
65F w/ uncontrolled DM2 c/b R diabetic foot ulcer/charcot foot, chronic hypoxia (3LNC) attributed to diastolic heart failure?/pulmHTN pending w/u, morbid obesity with charted concern for OHS, chronic lower back pain from C/T/L disc disease previously s/p cord decompression/spinal fusion on chronic opioid, hx of uterine ca s/p TAHBSO, remote hx of dvt no longer on ac presents to [facility] for evaluation of right foot infection refractory to outpatient antibiotics (foot reported erythematous, swollen, painful, purulent drainage, not responding to augmentin over last 4 weeks; sent in by visiting RN for worsening wound).  PMH: as noted above; PSH: TAHBSO, , cholecystectomy,appendectomy, spinal fusion; Fhx: denies heart attack in parents; Social:denies smoking,alcohol, drugs and lives alone- from , some ambulation with walker but principally uses motorized scooter; Allergy: bactrim-rash; Med: as noted above; ROS: CONSTITUTIONAL: No fever, chills+; EYES: No eye pain, no acute blindness; Mouth: no pain in mouth, no cuts; RESPIRATORY: No cough, intermittent sob chronically on supplemental oxygen; CARDIOVASCULAR: No CP, no palpitations; GASTROINTESTINAL: no abdominal pain, no n/v/d; GENITOURINARY: No dysuria, no hematuria; Heme: No easy bruising, no swelling of neck  NEUROLOGICAL: No seizure, No acute paralysis; SKIN: right foot ulcer with redness and purulence+; MUSCULOSKELETAL: No acute joint pain, no joint swelling  Physical Exam:   T(F): 98.3 (2020 21:18), Max: 98.5 (2020 15:58)  HR: 91 (2020 21:18) (91 - 97)  BP: 96/56 (2020 21:18) (96/56 - 167/74)  RR: 19 (2020 21:18) (17 - 19)  SpO2: 97% (2020 21:18) (97% - 97%) on 3-4L  GENERAL: NAD, nontoxic, morbidly obese  HEAD:  Atraumatic, Normocephalic  EYES: EOMI, PERRL, conjunctiva and sclera clear  Mouth: MMM, no lesions  NECK: Supple, no appreciable masses  Lung: normal work of breathing, cta b/l  Chest: S1&S2+, rrr, mild murmur present  ABDOMEN: bs+, soft, nt, nd  : No de la torre catheter  EXTREMITIES:  radial pulse present b/l, PT present b/l, b/l mild pitting edema below knee  Neuro: A&Ox3, no flaccid paralysis in extremities appreciated  SKIN: warm and dry, On the plantar surface of the right foot there is a ulcer present w/o emma drainage or bleeding, no crepitus appreciated in the area of ulcer, erythematous and warm. b/l skin over tibia with erythema and chronic skin changes associated w/ venous stasis dermatitis and left foot also has erythema associated with chronic venous stasis dermatitis  See above regarding review of available labs, imaging and ekg  --I called overnight podiatry resident for consultation following exam and interview of the patient  A/P: 65F w/ uncontrolled DM2 c/b R diabetic foot ulcer/charcot foot with above comorbidities admitted to medicine for diabetic foot infection.  - c/w vancomycin and zosyn, blood culture collected in the ED, ESR pending. Podiatry on-call resident has been consulted pending recommendation. Noted to be relatively hypotensive since presentation however patient is nontoxic appearing and per conversation with the patient and per chart review she has had SBP approximating 100mmHg previously in normal condition. Does not appear to have sepsis at time of medicine admit. Agree with holding IV fluids for now given risk of chronic right-sided heart failure associated with pulmHTN. Can continue w/ home pain regimen with holding parameters. See iSTOP chart note that MAR completed.  Patient assigned to me by night hospitalist in charge for management and care for patient for this evening only. Care to be resumed by day hospitalist at 08:00 in the morning and thereafter.     housestaff resident to complete the rest of documentation following their exam and interview.    Kumar Shirley MD  Medicine Attending  Department of Hospital Medicine  pager: [phone] (available from 20:00 to 08:00) 65F w/ uncontrolled DM2 c/b R diabetic foot ulcer/charcot foot, chronic hypoxia (3LNC) attributed to diastolic heart failure?/pulmHTN pending w/u, morbid obesity with charted concern for OHS, chronic lower back pain from C/T/L disc disease previously s/p cord decompression/spinal fusion on chronic opioid, hx of uterine ca s/p TAHBSO, remote hx of dvt no longer on ac presents to [facility] for evaluation of right foot infection refractory to outpatient antibiotics (foot reported erythematous, swollen, painful, purulent drainage, not responding to augmentin over last 4 weeks; sent in by visiting RN for worsening wound).  PMH: as noted above; PSH: TAHBSO, , cholecystectomy,appendectomy, spinal fusion; Fhx: denies heart attack in parents; Social:denies smoking,alcohol, drugs and lives alone- from , some ambulation with walker but principally uses motorized scooter; Allergy: bactrim-rash; Med: as noted above; ROS: CONSTITUTIONAL: No fever, chills+; EYES: No eye pain, no acute blindness; Mouth: no pain in mouth, no cuts; RESPIRATORY: No cough, intermittent sob chronically on supplemental oxygen; CARDIOVASCULAR: No CP, no palpitations; GASTROINTESTINAL: no abdominal pain, no n/v/d; GENITOURINARY: No dysuria, no hematuria; Heme: No easy bruising, no swelling of neck  NEUROLOGICAL: No seizure, No acute paralysis; SKIN: right foot ulcer with redness and purulence+; MUSCULOSKELETAL: No acute joint pain, no joint swelling  Physical Exam:   T(F): 98.3 (2020 21:18), Max: 98.5 (2020 15:58)  HR: 91 (2020 21:18) (91 - 97)  BP: 96/56 (2020 21:18) (96/56 - 167/74)  RR: 19 (2020 21:18) (17 - 19)  SpO2: 97% (2020 21:18) (97% - 97%) on 3-4L  GENERAL: NAD, nontoxic, morbidly obese  HEAD:  Atraumatic, Normocephalic  EYES: EOMI, PERRL, conjunctiva and sclera clear  Mouth: MMM, no lesions  NECK: Supple, no appreciable masses  Lung: normal work of breathing, cta b/l  Chest: S1&S2+, rrr, mild murmur present  ABDOMEN: bs+, soft, nt, nd  : No de la torre catheter  EXTREMITIES:  radial pulse present b/l, PT present b/l, b/l mild pitting edema below knee  Neuro: A&Ox3, no flaccid paralysis in extremities appreciated  SKIN: warm and dry, On the plantar surface of the right foot there is a ulcer present w/o emma drainage or bleeding, no crepitus appreciated in the area of ulcer, erythematous and warm. b/l skin over tibia with erythema and chronic skin changes associated w/ venous stasis dermatitis and left foot also has erythema associated with chronic venous stasis dermatitis  See above regarding review of available labs, imaging and ekg  --I called overnight podiatry resident for consultation following exam and interview of the patient  A/P: 65F w/ uncontrolled DM2 c/b R diabetic foot ulcer/charcot foot with above comorbidities admitted to medicine for diabetic foot infection.  - c/w vancomycin and zosyn, blood culture collected in the ED, ESR pending. Podiatry on-call resident has been consulted pending recommendation. Noted to be relatively hypotensive since presentation however patient is nontoxic appearing and per conversation with the patient and per chart review she has had SBP approximating 100mmHg previously in normal condition. Does not appear to have sepsis at time of medicine admit. Agree with holding IV fluids for now given risk of chronic right-sided heart failure associated with pulmHTN. Can continue home pain regimen--see istop chart note by MAR.    Case presented by Dr. Mckeon ([email or web address]).    I was physically present for the key portions of the evaluation & management service provided. I agree with the above history, physical, and plan unless otherwise noted within the attending attestation.    Kumar Shirley MD  Medicine Attending  Department of Hospital Medicine  pager: [phone] (available from 20:00 to 08:00)

## 2020-11-09 NOTE — H&P ADULT - NSHPREVIEWOFSYSTEMS_GEN_ALL_CORE
CONSTITUTIONAL: Negative for fever, chills, fatigue, recent weight changes  ENT/MOUTH: Negative for hearing difficulties, ear pain, sore throat, rhinorrhea  EYES: Negative for eye pain, vision changes  CARDIOVASCULAR: Negative for chest pain, palpitations, claudication, edema  RESPIRATORY: Negative for respiratory distress, cough, dyspnea  GASTROINTESTINAL: Negative for nausea, vomiting, diarrhea, constipation  GENITOURINARY: Negative for dysuria, decreased urinary frequency, hematuria  MUSCULOSKELETAL: Negative for joint pain, back pain, arthralgias, myalgias  SKIN: Negative for skin lesions, rashes, hair changes  NEUROLOGICAL: Negative for headache, weakness, numbness, parasthesias  PSYCHIATRIC: Negative for depression, anxiety  HEME/LYMPH: Negative for bruising, easy bleeding, lymphadenopathy  ENDOCRINE: Negative for polyuria, polydipsia, temperature intolerance CONSTITUTIONAL: Negative for fever, chills, fatigue  ENT/MOUTH: Negative for hearing difficulties, ear pain, sore throat, rhinorrhea  EYES: Negative for eye pain, vision changes  CARDIOVASCULAR: Negative for chest pain, palpitations, claudication, edema  RESPIRATORY: Negative for respiratory distress, cough, dyspnea. On home 3 L NC.  GASTROINTESTINAL: Negative for nausea, vomiting, diarrhea, constipation  GENITOURINARY: Negative for dysuria, decreased urinary frequency, hematuria  MUSCULOSKELETAL: Chronic back pain  SKIN: Negative for skin lesions, rashes, hair changes  NEUROLOGICAL: Negative for headache, weakness, numbness, paresthesias  PSYCHIATRIC: Negative for depression, anxiety  HEME/LYMPH: Negative for bruising, easy bleeding, lymphadenopathy  ENDOCRINE: Negative for polyuria, polydipsia, temperature intolerance

## 2020-11-09 NOTE — H&P ADULT - PROBLEM SELECTOR PLAN 8
On home oxycodone ER (I-STOP)  - Continue oxycodone ER 80 mg  - PRN oxycodone 5 mg for breakthrough  - Klonopin 0.5 mg PRN at bedtime

## 2020-11-09 NOTE — H&P ADULT - PROBLEM SELECTOR PLAN 7
- Renally dose medications  - Currently on vancomycin for concern for MRSA  - Trend Cr  - Monitor I&O

## 2020-11-09 NOTE — ED ADULT NURSE NOTE - OBJECTIVE STATEMENT
66 y/o female PMHx Asthma, CHF, DM T1, HLD, A+Ox4, BIBA, as per EMS pt here for swelling of right foot. pt states she had a foot infection in July and was admitted here, when she was discharged the internist stated she has a galloping strep infection. pt has a 4 inch opening under the right foot. pt denies any chills, denies any CP states she has SOB on exertion, pt on 3lpm NC sat 100%. abdomen distended, pt states she has back pain. bilateral LE edema and redness. safety precautions in place.

## 2020-11-09 NOTE — H&P ADULT - PROBLEM SELECTOR PLAN 4
Recent diagnosis  Repeat echo here  - Atorvastatin 80 mg (home on Rosuvastatin)  - Lasix Recent diagnosis  - Echo from [place] showed EF off 55-60%, grade II diastolic dysfunction  - Stress test from [place] was negative for ischemia  - Atorvastatin 80 mg (home on Rosuvastatin)  - Lasix* Recent diagnosis  - Echo from [place] showed EF off 55-60%, grade II diastolic dysfunction  - Stress test from [place] was negative for ischemia  - Atorvastatin 80 mg (home on Rosuvastatin)  - Hold Lasix pending improvement and stabilization of BP  - Repeat TTE here

## 2020-11-09 NOTE — ED PROVIDER NOTE - PHYSICAL EXAMINATION
General: nad   HEENT: EOMI, PERRLA, normal mucosa, normal oropharynx, no lesions on the lips or on oral mucosa, normal external ear  Neck: supple, no lymphadenopathy, full range of motion, no nuchal rigidity  CV: RRR, dp 2+ b/l   Resp: lungs CTA b/l  Abd: non-distended, soft, non-tender  : no CVA tenderness  MSK: full range of motion, b/l le pitting edema extending to knees worse on r side, r foot more swollen than l   Neuro: CN II-XII grossly intact, silt in all extremities  Skin: 2 cm ciruclar wound on sole of r foot w/ white eschar w/o purulence or drainage

## 2020-11-09 NOTE — H&P ADULT - PROBLEM SELECTOR PLAN 6
- Continue home Lantus 72 units qAM and qHS*  - Continue home Lispro 15 units premeal  - SSI - Continue home Lantus 72 units qAM and qHS*  - Continue home Lispro 15 units premeal  - SSI  - Endocrine consult in the AM due to currently ongoing insulin regimen adjustments and high requirements - Continue home Lantus 72 units qAM and qHS  - Continue home Lispro 15 units premeal  - SSI  - Endocrine consult in the AM due to currently ongoing insulin regimen adjustments and high requirements

## 2020-11-09 NOTE — H&P ADULT - PROBLEM SELECTOR PLAN 3
Recent diagnosis  - Repeat echo here Recent diagnosis  - Echo from [place] showing moderate pulmonary hypertension Recent diagnosis  - Echo from [place] showing moderate pulmonary hypertension  - Repeat TTE here

## 2020-11-09 NOTE — H&P ADULT - PROBLEM SELECTOR PLAN 1
History of chronic wound and charcot foot with concern for overlying infection due to new erythema and reported drainage  - Continue with empiric zosyn and vancomycin  - Podiatry consult - debrided at bedside and cultures obtained. Follow up wound cultures History of chronic wound and charcot foot with concern for overlying infection due to new erythema and reported drainage  - Continue with empiric antibiotics till wound culture  - Zosyn 3.375 gm q8hr*  - Vancomycin 2 gm q12hr*. Redose per vanc trough  - Podiatry consult - debrided at bedside and cultures obtained. Follow up wound cultures  - DVT US - unable to visualize R common femoral, but otherwise negative

## 2020-11-09 NOTE — H&P ADULT - PROBLEM SELECTOR PLAN 2
Patient on home O2 likely secondary to Patient on home O2 likely secondary to cardiac and pulmonary etiologies (pulmonary HTN and asthma)  - Continue supplemental O2

## 2020-11-09 NOTE — H&P ADULT - ASSESSMENT
Patient is a 65 year old woman with PMH morbid obesity, asthma, CHF, pulmonary HTN, home on 3L NC, T2DM, CKD, HTN, diabetic neuropathy, Charcot foot, chronic venous statis and chronic right foot wound, chronic back pain with spinal stenosis, who presents with worsening right foot wound and drainage not improved on augmentin concerning for diabetic foot wound Patient is a 65 year old woman with PMH morbid obesity, asthma, CHF, pulmonary HTN, home on 3L NC, T2DM, CKD, HTN, diabetic neuropathy, Charcot foot, chronic venous statis and chronic right foot wound, chronic back pain with spinal stenosis, who presents with worsening right foot wound and drainage not improved on augmentin concerning for diabetic foot wound

## 2020-11-09 NOTE — H&P ADULT - HISTORY OF PRESENT ILLNESS
Patient evaluated at bedside. admit for diabetic foot infection. To be seen with housestaff.  full h&P to follow Patient is a 65 year old woman with PMH morbid obesity, *** on 2L NC, T2DM, CKD, HTN, diabetic neuropathy, Charcot foot, chronic venous statis and chronic right foot wound, chronic back pain with spinal stenosis, who presents with worsening right foot wound. Patient reports having chronic foot wound since July that she is being managed by home wound care nurse. She reports that it is being treated with aquacell and daily dressing changes with wound care nurse three times a week and her son (pre-med) on other days. She states that she has been on Augmentin the past two weeks, and on 11/9 during dressing change it was noted to have drainage and also erythema around the right foot which was new and was advised by PCP (Dr. Mccann) to come to ED for evaluation. She states that she does not bear weight on her foot. She denies fevers. She reports numbness on bilateral lower extremities up to the level of mid shin.    She reports her diabetes has been difficult to manage with ongoing adjustments to her insulin regimen currently being made. She states that she usually is not in the 200s on her home BG checks but recently has been and that she is currently on Levemir 72 units twice a day and 15 units of NOvolog prior to meals. Patient is a 65 year old woman with PMH morbid obesity, asthma, CHF, pulmonary HTN, home on 3L NC, T2DM, CKD, HTN, diabetic neuropathy, Charcot foot, chronic venous statis and chronic right foot wound, chronic back pain with spinal stenosis, who presents with worsening right foot wound. Patient reports having chronic foot wound since July that she is being managed by home wound care nurse. No recalled trauma that caused wound. She reports that it is being treated with aquacell and daily dressing changes with wound care nurse three times a week and her son (pre-med) on other days. She states that she has been on Augmentin the past two weeks after noticing some drainage, but had been improving since 11/9. On 11/9 during dressing change it was noted to have drainage and also erythema around the right foot which was new and was advised by PCP (Dr. Mccann) to come to ED for evaluation. She states that she does not bear weight on her foot. She denies fevers. She reports numbness on bilateral lower extremities up to the level of mid shin. Patient denies fevers.    She reports her diabetes has been difficult to manage with ongoing adjustments to her insulin regimen currently being made. She states that she usually is not in the 200s on her home BG checks but recently has been and that she is currently on Levemir 72 units twice a day and 15 units of Novolog prior to meals. She used to see a home visit podiatrist but stopped. She reports no recent ophthalmologic evaluation due to COVID.    Patient reports recent diagnosis of asthma this year. Also reports recent diagnosis of CHF and pulmonary HTN at [place] this July after undergoing echo, stress test, and CT. Patient states that she was supposed to undergo cardiac catheterization but wanted to switch care to [facility] with Dr. Yung. She denies cough or early satiety. Reports having to sleep on 4 pillows at night.

## 2020-11-09 NOTE — H&P ADULT - NSHPLABSRESULTS_GEN_ALL_CORE
Personally reviewed available labs, imaging and ekg  CBC Full  -  ( 09 Nov 2020 17:45 )  WBC Count : 10.17 K/uL  RBC Count : 4.82 M/uL  Hemoglobin : 14.1 g/dL  Hematocrit : 44.7 %  Platelet Count - Automated : 227 K/uL  Mean Cell Volume : 92.7 fl  Mean Cell Hemoglobin : 29.3 pg  Mean Cell Hemoglobin Concentration : 31.5 gm/dL  Auto Neutrophil # : 6.89 K/uL  Auto Lymphocyte # : 2.10 K/uL  Auto Monocyte # : 0.85 K/uL  Auto Eosinophil # : 0.18 K/uL  Auto Basophil # : 0.07 K/uL  Auto Neutrophil % : 67.7 %  Auto Lymphocyte % : 20.6 %  Auto Monocyte % : 8.4 %  Auto Eosinophil % : 1.8 %  Auto Basophil % : 0.7 %  11-09  138  |  99  |  28<H>  ----------------------------<  187<H>  4.4   |  27  |  1.48<H>  Ca    10.0      09 Nov 2020 17:45  TPro  7.1  /  Alb  4.3  /  TBili  0.6  /  DBili  x   /  AST  16  /  ALT  16  /  AlkPhos  96  11-09  PT/INR - ( 09 Nov 2020 17:45 )   PT: 11.7 sec;   INR: 0.97 ratio    PTT - ( 09 Nov 2020 17:45 )  PTT:27.7 sec  Imaging:  xray of right foot does not demonstrate open fracture on my review  < from: Xray Foot AP + Lateral + Oblique, Right (11.09.20 @ 17:43) >    FINDINGS:    OSSEOUS STRUCTURES.    Charcot Changes:  Charcot midfoot changes are again seen with fragmentation of the talar head and neck and navicular with displacement of the fragments. Sclerosis and productive changesare also present.    Fractures:  There is healed fracture deformity involving the fifth metatarsal proximal diametaphysis.    JOINTS    Joint Space(s):  There is mild widening of the anterior tibiotalar joint space. Remaining joint spaces are otherwise relatively preserved apart from a Charcot changes.    SOFT TISSUES:  Soft tissue swelling is noted without appreciated gas. Dystrophic calcifications are noted posterior to the distal tibia.    IMPRESSION:  1. No soft tissue gas or acute osseous destruction is appreciated.  2. Charcot changes of the midfoot are again seen.  3. MRI may be helpful for further evaluation of osteomyelitis as warranted.    < end of copied text >      EKG: NSR 97, no st elevation c/w stemi appreciated Personally reviewed available labs, imaging and ekg  CBC Full  -  ( 09 Nov 2020 17:45 )  WBC Count : 10.17 K/uL  RBC Count : 4.82 M/uL  Hemoglobin : 14.1 g/dL  Hematocrit : 44.7 %  Platelet Count - Automated : 227 K/uL  Mean Cell Volume : 92.7 fl  Mean Cell Hemoglobin : 29.3 pg  Mean Cell Hemoglobin Concentration : 31.5 gm/dL  Auto Neutrophil # : 6.89 K/uL  Auto Lymphocyte # : 2.10 K/uL  Auto Monocyte # : 0.85 K/uL  Auto Eosinophil # : 0.18 K/uL  Auto Basophil # : 0.07 K/uL  Auto Neutrophil % : 67.7 %  Auto Lymphocyte % : 20.6 %  Auto Monocyte % : 8.4 %  Auto Eosinophil % : 1.8 %  Auto Basophil % : 0.7 %  11-09  138  |  99  |  28<H>  ----------------------------<  187<H>  4.4   |  27  |  1.48<H>  Ca    10.0      09 Nov 2020 17:45  TPro  7.1  /  Alb  4.3  /  TBili  0.6  /  DBili  x   /  AST  16  /  ALT  16  /  AlkPhos  96  11-09  PT/INR - ( 09 Nov 2020 17:45 )   PT: 11.7 sec;   INR: 0.97 ratio    PTT - ( 09 Nov 2020 17:45 )  PTT:27.7 sec  Imaging:  xray of right foot does not demonstrate open fracture on my review  < from: Xray Foot AP + Lateral + Oblique, Right (11.09.20 @ 17:43) >    FINDINGS:    OSSEOUS STRUCTURES.    Charcot Changes:  Charcot midfoot changes are again seen with fragmentation of the talar head and neck and navicular with displacement of the fragments. Sclerosis and productive changesare also present.    Fractures:  There is healed fracture deformity involving the fifth metatarsal proximal diametaphysis.    JOINTS    Joint Space(s):  There is mild widening of the anterior tibiotalar joint space. Remaining joint spaces are otherwise relatively preserved apart from a Charcot changes.    SOFT TISSUES:  Soft tissue swelling is noted without appreciated gas. Dystrophic calcifications are noted posterior to the distal tibia.    IMPRESSION:  1. No soft tissue gas or acute osseous destruction is appreciated.  2. Charcot changes of the midfoot are again seen.  3. MRI may be helpful for further evaluation of osteomyelitis as warranted.    < end of copied text >      EKG: NSR 97, no st elevation c/w stemi appreciated    [facility] Echocardiogram report:  7/28/2020  Summary:   1. Left ventricular ejection fraction, by visual estimation, is 55 to 60%.   2. Normal global left ventricular systolic function.   3. Spectral Doppler shows pseudonormal pattern of left ventricular myocardial filling (Grade II diastolic dysfunction).   4. There is no evidence of pericardial effusion.   5. Sclerotic aortic valve with normal opening.   6. Estimated pulmonary artery systolic pressure is 53.4 mmHg assuming a right atrial pressure of 5 mmHg, which is consistent with moderate pulmonary hypertension.   7. Endocardial visualization was enhanced with intravenous echo contrast.      [facility] stress test  7/28/2020  Summary:   1. Definity Echo contrast was used. Difficult study due to patients body habitus.   2. Negative stress echo for ischemia. Despite EKG changes, there were no regional wall motion abnormalities.   3. Rare PVCs were seen.   4. Elevated PASP at rest and at peak of infusion.

## 2020-11-09 NOTE — ED ADULT NURSE NOTE - NSIMPLEMENTINTERV_GEN_ALL_ED
Implemented All Fall Risk Interventions:  [place] to call system. Call bell, personal items and telephone within reach. Instruct patient to call for assistance. Room bathroom lighting operational. Non-slip footwear when patient is off stretcher. Physically safe environment: no spills, clutter or unnecessary equipment. Stretcher in lowest position, wheels locked, appropriate side rails in place. Provide visual cue, wrist band, yellow gown, etc. Monitor gait and stability. Monitor for mental status changes and reorient to person, place, and time. Review medications for side effects contributing to fall risk. Reinforce activity limits and safety measures with patient and family.

## 2020-11-09 NOTE — ED PROVIDER NOTE - PROGRESS NOTE DETAILS
gavin pgy3: lactate noted, hx hf, and appears volume overloaded. will defer ivf at this time. otherwise stable, given abx. pending US. endorsed to hospitalist.

## 2020-11-09 NOTE — ED PROVIDER NOTE - CLINICAL SUMMARY MEDICAL DECISION MAKING FREE TEXT BOX
gavin pgy3: 64 yo f pw r foot swelling/erythema, warmth noted, clear demarcation w/o crepitus. otherwise well appearing and no s/s of illness. do not suspect nec fasc at this time. pt has had w/u for sx before, w negative dvt, susceptible to cellulitis. suspect soft tissue infection. will eval for possible complications including dvt, OM. labs, imaging, abx, tba.

## 2020-11-09 NOTE — ED PROVIDER NOTE - NS ED ROS FT
GENERAL: No fever or chills, //             EYES: no change in vision, //             HEENT: no trouble swallowing or speaking, //             CARDIAC: no chest pain, //              PULMONARY: no cough or SOB, //             GI: no abdominal pain, no nausea or no vomiting, no diarrhea or constipation, //             : No changes in urination,  //            SKIN: chronic wound  //            NEURO: no headache,  //             MSK: rle swelling . ~Say Gonzalez DO PGY3

## 2020-11-10 ENCOUNTER — TRANSCRIPTION ENCOUNTER (OUTPATIENT)
Age: 65
End: 2020-11-10

## 2020-11-10 LAB
A1C WITH ESTIMATED AVERAGE GLUCOSE RESULT: 10.4 % — HIGH (ref 4–5.6)
ANION GAP SERPL CALC-SCNC: 16 MMOL/L — SIGNIFICANT CHANGE UP (ref 5–17)
BUN SERPL-MCNC: 34 MG/DL — HIGH (ref 7–23)
CALCIUM SERPL-MCNC: 9.8 MG/DL — SIGNIFICANT CHANGE UP (ref 8.4–10.5)
CHLORIDE SERPL-SCNC: 95 MMOL/L — LOW (ref 96–108)
CO2 SERPL-SCNC: 25 MMOL/L — SIGNIFICANT CHANGE UP (ref 22–31)
CREAT SERPL-MCNC: 2.07 MG/DL — HIGH (ref 0.5–1.3)
ERYTHROCYTE [SEDIMENTATION RATE] IN BLOOD: 72 MM/HR — HIGH (ref 0–20)
ERYTHROCYTE [SEDIMENTATION RATE] IN BLOOD: 78 MM/HR — HIGH (ref 0–20)
ESTIMATED AVERAGE GLUCOSE: 252 MG/DL — HIGH (ref 68–114)
GLUCOSE BLDC GLUCOMTR-MCNC: 244 MG/DL — HIGH (ref 70–99)
GLUCOSE BLDC GLUCOMTR-MCNC: 257 MG/DL — HIGH (ref 70–99)
GLUCOSE BLDC GLUCOMTR-MCNC: 260 MG/DL — HIGH (ref 70–99)
GLUCOSE BLDC GLUCOMTR-MCNC: 268 MG/DL — HIGH (ref 70–99)
GLUCOSE SERPL-MCNC: 247 MG/DL — HIGH (ref 70–99)
HCT VFR BLD CALC: 44.3 % — SIGNIFICANT CHANGE UP (ref 34.5–45)
HGB BLD-MCNC: 13.8 G/DL — SIGNIFICANT CHANGE UP (ref 11.5–15.5)
MAGNESIUM SERPL-MCNC: 2.1 MG/DL — SIGNIFICANT CHANGE UP (ref 1.6–2.6)
MCHC RBC-ENTMCNC: 29.7 PG — SIGNIFICANT CHANGE UP (ref 27–34)
MCHC RBC-ENTMCNC: 31.2 GM/DL — LOW (ref 32–36)
MCV RBC AUTO: 95.3 FL — SIGNIFICANT CHANGE UP (ref 80–100)
NRBC # BLD: 0 /100 WBCS — SIGNIFICANT CHANGE UP (ref 0–0)
PHOSPHATE SERPL-MCNC: 4.2 MG/DL — SIGNIFICANT CHANGE UP (ref 2.5–4.5)
PLATELET # BLD AUTO: 220 K/UL — SIGNIFICANT CHANGE UP (ref 150–400)
POTASSIUM SERPL-MCNC: 4.8 MMOL/L — SIGNIFICANT CHANGE UP (ref 3.5–5.3)
POTASSIUM SERPL-SCNC: 4.8 MMOL/L — SIGNIFICANT CHANGE UP (ref 3.5–5.3)
RBC # BLD: 4.65 M/UL — SIGNIFICANT CHANGE UP (ref 3.8–5.2)
RBC # FLD: 13.9 % — SIGNIFICANT CHANGE UP (ref 10.3–14.5)
SARS-COV-2 RNA SPEC QL NAA+PROBE: SIGNIFICANT CHANGE UP
SODIUM SERPL-SCNC: 136 MMOL/L — SIGNIFICANT CHANGE UP (ref 135–145)
VANCOMYCIN TROUGH SERPL-MCNC: 20.8 UG/ML — HIGH (ref 10–20)
WBC # BLD: 9.59 K/UL — SIGNIFICANT CHANGE UP (ref 3.8–10.5)
WBC # FLD AUTO: 9.59 K/UL — SIGNIFICANT CHANGE UP (ref 3.8–10.5)

## 2020-11-10 PROCEDURE — 99233 SBSQ HOSP IP/OBS HIGH 50: CPT

## 2020-11-10 PROCEDURE — 93925 LOWER EXTREMITY STUDY: CPT | Mod: 26

## 2020-11-10 RX ORDER — CHLORHEXIDINE GLUCONATE 213 G/1000ML
1 SOLUTION TOPICAL DAILY
Refills: 0 | Status: DISCONTINUED | OUTPATIENT
Start: 2020-11-10 | End: 2020-11-25

## 2020-11-10 RX ORDER — NYSTATIN CREAM 100000 [USP'U]/G
1 CREAM TOPICAL
Refills: 0 | Status: DISCONTINUED | OUTPATIENT
Start: 2020-11-10 | End: 2020-11-25

## 2020-11-10 RX ORDER — OXYCODONE HYDROCHLORIDE 5 MG/1
5 TABLET ORAL EVERY 8 HOURS
Refills: 0 | Status: DISCONTINUED | OUTPATIENT
Start: 2020-11-10 | End: 2020-11-17

## 2020-11-10 RX ORDER — COLLAGENASE CLOSTRIDIUM HIST. 250 UNIT/G
1 OINTMENT (GRAM) TOPICAL DAILY
Refills: 0 | Status: DISCONTINUED | OUTPATIENT
Start: 2020-11-10 | End: 2020-11-25

## 2020-11-10 RX ADMIN — Medication 15 UNIT(S): at 17:32

## 2020-11-10 RX ADMIN — OXYCODONE HYDROCHLORIDE 80 MILLIGRAM(S): 5 TABLET ORAL at 18:05

## 2020-11-10 RX ADMIN — PIPERACILLIN AND TAZOBACTAM 25 GRAM(S): 4; .5 INJECTION, POWDER, LYOPHILIZED, FOR SOLUTION INTRAVENOUS at 16:15

## 2020-11-10 RX ADMIN — Medication 1: at 21:51

## 2020-11-10 RX ADMIN — ATORVASTATIN CALCIUM 80 MILLIGRAM(S): 80 TABLET, FILM COATED ORAL at 21:54

## 2020-11-10 RX ADMIN — OXYCODONE HYDROCHLORIDE 80 MILLIGRAM(S): 5 TABLET ORAL at 17:33

## 2020-11-10 RX ADMIN — NYSTATIN CREAM 1 APPLICATION(S): 100000 CREAM TOPICAL at 17:32

## 2020-11-10 RX ADMIN — Medication 15 UNIT(S): at 12:31

## 2020-11-10 RX ADMIN — Medication 250 MILLIGRAM(S): at 22:36

## 2020-11-10 RX ADMIN — INSULIN GLARGINE 72 UNIT(S): 100 INJECTION, SOLUTION SUBCUTANEOUS at 21:51

## 2020-11-10 RX ADMIN — ENOXAPARIN SODIUM 40 MILLIGRAM(S): 100 INJECTION SUBCUTANEOUS at 12:31

## 2020-11-10 RX ADMIN — Medication 3 MILLILITER(S): at 17:33

## 2020-11-10 RX ADMIN — Medication 3: at 17:33

## 2020-11-10 RX ADMIN — OXYCODONE HYDROCHLORIDE 80 MILLIGRAM(S): 5 TABLET ORAL at 05:43

## 2020-11-10 RX ADMIN — Medication 2: at 08:04

## 2020-11-10 RX ADMIN — Medication 0.5 MILLIGRAM(S): at 00:27

## 2020-11-10 RX ADMIN — Medication 250 MILLIGRAM(S): at 10:48

## 2020-11-10 RX ADMIN — Medication 81 MILLIGRAM(S): at 12:31

## 2020-11-10 RX ADMIN — Medication 3 MILLILITER(S): at 05:43

## 2020-11-10 RX ADMIN — OXYCODONE HYDROCHLORIDE 80 MILLIGRAM(S): 5 TABLET ORAL at 06:45

## 2020-11-10 RX ADMIN — Medication 3 MILLILITER(S): at 12:31

## 2020-11-10 RX ADMIN — CHLORHEXIDINE GLUCONATE 1 APPLICATION(S): 213 SOLUTION TOPICAL at 12:32

## 2020-11-10 RX ADMIN — OXYCODONE HYDROCHLORIDE 5 MILLIGRAM(S): 5 TABLET ORAL at 11:25

## 2020-11-10 RX ADMIN — PIPERACILLIN AND TAZOBACTAM 25 GRAM(S): 4; .5 INJECTION, POWDER, LYOPHILIZED, FOR SOLUTION INTRAVENOUS at 02:05

## 2020-11-10 RX ADMIN — Medication 3: at 12:31

## 2020-11-10 RX ADMIN — INSULIN GLARGINE 72 UNIT(S): 100 INJECTION, SOLUTION SUBCUTANEOUS at 08:07

## 2020-11-10 RX ADMIN — OXYCODONE HYDROCHLORIDE 5 MILLIGRAM(S): 5 TABLET ORAL at 10:51

## 2020-11-10 RX ADMIN — Medication 15 UNIT(S): at 08:04

## 2020-11-10 NOTE — PROGRESS NOTE ADULT - SUBJECTIVE AND OBJECTIVE BOX
Podiatry pager #: [phone] ([place])/ [phone] ([facility])    Patient is a 65y old  Female who presents with a chief complaint of      INTERVAL HPI/OVERNIGHT EVENTS:  Patient seen and evaluated at bedside.  Pt is resting comfortable in NAD. Denies N/V/F/C.     Allergies    adhesives (Rash)  latex (Rash)  No Known Drug Allergies  wool- rash, itch (Other)    Intolerances        Vital Signs Last 24 Hrs  T(C): 36.7 (10 Nov 2020 16:00), Max: 36.8 (09 Nov 2020 20:23)  T(F): 98 (10 Nov 2020 16:00), Max: 98.3 (09 Nov 2020 21:18)  HR: 75 (10 Nov 2020 16:00) (69 - 97)  BP: 99/60 (10 Nov 2020 16:00) (96/56 - 116/68)  BP(mean): --  RR: 18 (10 Nov 2020 16:00) (18 - 19)  SpO2: 99% (10 Nov 2020 16:00) (94% - 100%)    LABS:                        13.8   9.59  )-----------( 220      ( 10 Nov 2020 12:36 )             44.3     11-10    136  |  95<L>  |  34<H>  ----------------------------<  247<H>  4.8   |  25  |  2.07<H>    Ca    9.8      10 Nov 2020 09:35  Phos  4.2     11-10  Mg     2.1     11-10    TPro  7.1  /  Alb  4.3  /  TBili  0.6  /  DBili  x   /  AST  16  /  ALT  16  /  AlkPhos  96  11-09    PT/INR - ( 09 Nov 2020 17:45 )   PT: 11.7 sec;   INR: 0.97 ratio         PTT - ( 09 Nov 2020 17:45 )  PTT:27.7 sec    CAPILLARY BLOOD GLUCOSE      POCT Blood Glucose.: 257 mg/dL (10 Nov 2020 17:18)  POCT Blood Glucose.: 260 mg/dL (10 Nov 2020 12:00)  POCT Blood Glucose.: 244 mg/dL (10 Nov 2020 07:58)  POCT Blood Glucose.: 268 mg/dL (09 Nov 2020 23:18)      Lower Extremity Physical Exam:      Vascular: DP/PT 1/4 B/L, CFT <3 seconds B/L, Temperature gradient warm to cool B/L  Neuro: Epicritic sensation diminished to the level of midfoot B/L  Musculoskeletal/Ortho: charcot foot deformity R, non-ambi  Skin: Right plantar midfoot wound to subq, no purulence, no fluctuance, mild malodor, hyperkeratotic borders, unimproved erythema of R dorsal and plantar forefoot      RADIOLOGY & ADDITIONAL TESTS:

## 2020-11-10 NOTE — PROGRESS NOTE ADULT - ASSESSMENT
Patient is a 65 year old woman with PMH morbid obesity, asthma, CHF, pulmonary HTN, home on 3L NC, T2DM, CKD, HTN, diabetic neuropathy, Charcot foot, chronic venous statis and chronic right foot wound, chronic back pain with spinal stenosis, who presents with worsening right foot wound and drainage not improved on augmentin concerning for diabetic foot wound

## 2020-11-10 NOTE — CONSULT NOTE ADULT - SUBJECTIVE AND OBJECTIVE BOX
Podiatry pager #: [phone] ([place])/ [phone] ([facility])    Patient is a 65y old  Female who presents with a chief complaint of right foot wound    HPI:  Patient evaluated at bedside. admit for diabetic foot infection. To be seen with housestaff.  full h&P to follow (09 Nov 2020 23:30)      PAST MEDICAL & SURGICAL HISTORY:  CKD (chronic kidney disease)    Insomnia    Edema of lower extremity  on lasix    Vitamin D deficiency    Morbid Obesity    Cataract    Dyslipidemia    Deep Vein Thrombosis (DVT)  Left leg, 2004, treated and resolved    Spinal Stenosis, Lumbar    Cervical Stenosis of Spine    Endometrial Hyperplasia    HTN (Hypertension)    Diabetes Mellitus Type II    S/P cholecystectomy    S/P appendectomy    S/P total abdominal hysterectomy and bilateral salpingo-oophorectomy    H/O colonoscopy  1998    H/O laser iridotomy  left eye, 2016    Endometrial biopsy 12/02/09    Tonsillectomy as a child    Cervical Spinal Stenosis surgery x2 (01/2002, 7/2002)    D&amp;C 2008  hysteroscopy, endometrial hyperplasia, 2009    D&amp;C x2 1980&#x27;s    Cholecystectomy/appendectomy @ age 25    C Section 1994    Cataract extracted with lens implant 1998  Right        MEDICATIONS  (STANDING):  albuterol/ipratropium for Nebulization. 3 milliLiter(s) Nebulizer every 6 hours  aspirin enteric coated 81 milliGRAM(s) Oral daily  atorvastatin 80 milliGRAM(s) Oral at bedtime  dextrose 5%. 1000 milliLiter(s) (50 mL/Hr) IV Continuous <Continuous>  dextrose 50% Injectable 12.5 Gram(s) IV Push once  dextrose 50% Injectable 25 Gram(s) IV Push once  dextrose 50% Injectable 25 Gram(s) IV Push once  enoxaparin Injectable 40 milliGRAM(s) SubCutaneous daily  influenza   Vaccine 0.5 milliLiter(s) IntraMuscular once  insulin glargine Injectable (LANTUS) 72 Unit(s) SubCutaneous every morning  insulin glargine Injectable (LANTUS) 72 Unit(s) SubCutaneous at bedtime  insulin lispro (ADMELOG) corrective regimen sliding scale   SubCutaneous three times a day before meals  insulin lispro (ADMELOG) corrective regimen sliding scale   SubCutaneous at bedtime  insulin lispro Injectable (ADMELOG) 15 Unit(s) SubCutaneous three times a day before meals  oxyCODONE  ER Tablet 80 milliGRAM(s) Oral every 12 hours  piperacillin/tazobactam IVPB.. 3.375 Gram(s) IV Intermittent every 8 hours  vancomycin  IVPB 2000 milliGRAM(s) IV Intermittent every 12 hours    MEDICATIONS  (PRN):  clonazePAM  Tablet 0.5 milliGRAM(s) Oral at bedtime PRN ancxiety  dextrose 40% Gel 15 Gram(s) Oral once PRN Blood Glucose LESS THAN 70 milliGRAM(s)/deciliter  glucagon  Injectable 1 milliGRAM(s) IntraMuscular once PRN Glucose LESS THAN 70 milligrams/deciliter  oxyCODONE    IR 5 milliGRAM(s) Oral every 8 hours PRN Breakthrough Pain      Allergies    adhesives (Rash)  latex (Rash)  No Known Drug Allergies  wool- rash, itch (Other)    Intolerances        VITALS:    Vital Signs Last 24 Hrs  T(C): 36.8 (09 Nov 2020 21:18), Max: 36.9 (09 Nov 2020 15:58)  T(F): 98.3 (09 Nov 2020 21:18), Max: 98.5 (09 Nov 2020 15:58)  HR: 91 (09 Nov 2020 21:18) (91 - 97)  BP: 96/56 (09 Nov 2020 21:18) (96/56 - 167/74)  BP(mean): --  RR: 19 (09 Nov 2020 21:18) (17 - 19)  SpO2: 97% (09 Nov 2020 21:18) (97% - 97%)    LABS:                          14.1   10.17 )-----------( 227      ( 09 Nov 2020 17:45 )             44.7       11-09    138  |  99  |  28<H>  ----------------------------<  187<H>  4.4   |  27  |  1.48<H>    Ca    10.0      09 Nov 2020 17:45    TPro  7.1  /  Alb  4.3  /  TBili  0.6  /  DBili  x   /  AST  16  /  ALT  16  /  AlkPhos  96  11-09      CAPILLARY BLOOD GLUCOSE      POCT Blood Glucose.: 268 mg/dL (09 Nov 2020 23:18)  POCT Blood Glucose.: 188 mg/dL (09 Nov 2020 17:43)      PT/INR - ( 09 Nov 2020 17:45 )   PT: 11.7 sec;   INR: 0.97 ratio         PTT - ( 09 Nov 2020 17:45 )  PTT:27.7 sec    LOWER EXTREMITY PHYSICAL EXAM:    Vascular: DP/PT 1/4 B/L, CFT <3 seconds B/L, Temperature gradient warm to cool B/L  Neuro: Epicritic sensation diminished to the level of midfoot B/L  Musculoskeletal/Ortho: charcot foot deformity R, non-ambi  Skin: Right plantar midfoot wound to subq, no purulence, no fluctuance, mild malodor, hyperkeratotic borders, erythema of R dorsal and plantar forefoot      RADIOLOGY & ADDITIONAL STUDIES:    < from: Xray Foot AP + Lateral + Oblique, Right (11.09.20 @ 17:43) >    EXAM:  FOOT COMPLETE RIGHT (MIN 3 VIEW)                            PROCEDURE DATE:  11/09/2020            INTERPRETATION:  XR FOOT COMPLETE 3 VIEWS RIGHT    HISTORY: wound on sole, eval for free air. Pain. Wound infection.    VIEWS: 2  IMAGES: 2    COMPARISON: July 20, 2020    FINDINGS:    OSSEOUS STRUCTURES.    Charcot Changes:  Charcot midfoot changes are again seen with fragmentation of the talar head and neck and navicular with displacement of the fragments. Sclerosis and productive changesare also present.    Fractures:  There is healed fracture deformity involving the fifth metatarsal proximal diametaphysis.    JOINTS    Joint Space(s):  There is mild widening of the anterior tibiotalar joint space. Remaining joint spaces are otherwise relatively preserved apart from a Charcot changes.    SOFT TISSUES:  Soft tissue swelling is noted without appreciated gas. Dystrophic calcifications are noted posterior to the distal tibia.    IMPRESSION:  1. No soft tissue gas or acute osseous destruction is appreciated.  2. Charcot changes of the midfoot are again seen.  3. MRI may be helpful for further evaluation of osteomyelitis as warranted.                STEVIE DON MD; Resident Interventional Radiology  This document has been electronically signed.  TAINA MARTINEZ MD; Attending Radiologist  This document has been electronically signed. Nov 9 2020  6:08PM    < end of copied text >

## 2020-11-10 NOTE — PROGRESS NOTE ADULT - SUBJECTIVE AND OBJECTIVE BOX
Patient is a 65y old  Female who presents with a chief complaint of     SUBJECTIVE / OVERNIGHT EVENTS: patient reports pain in her R foot, has noticed her feet turning dusky at times     MEDICATIONS  (STANDING):  albuterol/ipratropium for Nebulization. 3 milliLiter(s) Nebulizer every 6 hours  aspirin enteric coated 81 milliGRAM(s) Oral daily  atorvastatin 80 milliGRAM(s) Oral at bedtime  chlorhexidine 2% Cloths 1 Application(s) Topical daily  dextrose 5%. 1000 milliLiter(s) (50 mL/Hr) IV Continuous <Continuous>  dextrose 50% Injectable 12.5 Gram(s) IV Push once  dextrose 50% Injectable 25 Gram(s) IV Push once  dextrose 50% Injectable 25 Gram(s) IV Push once  enoxaparin Injectable 40 milliGRAM(s) SubCutaneous daily  influenza   Vaccine 0.5 milliLiter(s) IntraMuscular once  insulin glargine Injectable (LANTUS) 72 Unit(s) SubCutaneous every morning  insulin glargine Injectable (LANTUS) 72 Unit(s) SubCutaneous at bedtime  insulin lispro (ADMELOG) corrective regimen sliding scale   SubCutaneous three times a day before meals  insulin lispro (ADMELOG) corrective regimen sliding scale   SubCutaneous at bedtime  insulin lispro Injectable (ADMELOG) 15 Unit(s) SubCutaneous three times a day before meals  nystatin Powder 1 Application(s) Topical two times a day  oxyCODONE  ER Tablet 80 milliGRAM(s) Oral every 12 hours  piperacillin/tazobactam IVPB.. 3.375 Gram(s) IV Intermittent every 8 hours  vancomycin  IVPB 2000 milliGRAM(s) IV Intermittent every 12 hours    MEDICATIONS  (PRN):  clonazePAM  Tablet 0.5 milliGRAM(s) Oral at bedtime PRN ancxiety  dextrose 40% Gel 15 Gram(s) Oral once PRN Blood Glucose LESS THAN 70 milliGRAM(s)/deciliter  glucagon  Injectable 1 milliGRAM(s) IntraMuscular once PRN Glucose LESS THAN 70 milligrams/deciliter  oxyCODONE    IR 5 milliGRAM(s) Oral every 8 hours PRN Breakthrough Pain      Vital Signs Last 24 Hrs  T(C): 36.6 (10 Nov 2020 09:02), Max: 36.9 (09 Nov 2020 15:58)  T(F): 97.8 (10 Nov 2020 09:02), Max: 98.5 (09 Nov 2020 15:58)  HR: 76 (10 Nov 2020 09:02) (69 - 97)  BP: 102/53 (10 Nov 2020 09:02) (96/56 - 167/74)  BP(mean): --  RR: 18 (10 Nov 2020 09:02) (17 - 19)  SpO2: 94% (10 Nov 2020 09:02) (94% - 100%)  CAPILLARY BLOOD GLUCOSE      POCT Blood Glucose.: 244 mg/dL (10 Nov 2020 07:58)  POCT Blood Glucose.: 268 mg/dL (09 Nov 2020 23:18)  POCT Blood Glucose.: 188 mg/dL (09 Nov 2020 17:43)    I&O's Summary    09 Nov 2020 07:01  -  10 Nov 2020 07:00  --------------------------------------------------------  IN: 350 mL / OUT: 900 mL / NET: -550 mL      PHYSICAL EXAM:  GENERAL: NAD  EYES: conjunctiva and sclera clear  CHEST/LUNG: Clear to auscultation bilaterally; No wheeze  HEART: +s1/S2, reg   ABDOMEN: Soft, Nontender, Nondistended; Bowel sounds present  EXTREMITIES:  b/l LE edema with skin changes, R foot with dressing in place, foot is erythematous and tender   PSYCH: AAOx3    LABS:                        14.1   10.17 )-----------( 227      ( 09 Nov 2020 17:45 )             44.7     11-10    136  |  95<L>  |  34<H>  ----------------------------<  247<H>  4.8   |  25  |  2.07<H>    Ca    9.8      10 Nov 2020 09:35  Phos  4.2     11-10  Mg     2.1     11-10    TPro  7.1  /  Alb  4.3  /  TBili  0.6  /  DBili  x   /  AST  16  /  ALT  16  /  AlkPhos  96  11-09    PT/INR - ( 09 Nov 2020 17:45 )   PT: 11.7 sec;   INR: 0.97 ratio         PTT - ( 09 Nov 2020 17:45 )  PTT:27.7 sec

## 2020-11-10 NOTE — CONSULT NOTE ADULT - ASSESSMENT
65F with right plantar midfoot wound 2/2 Charcot deformity   -pt seen and evaluated   -afebrile, WBC 10.17, CRP 1.65, ESR pending   -Right plantar midfoot wound to subq, no purulence, no fluctuance, mild malodor, hyperkeratotic borders, erythema of R dorsal and plantar forefoot  -Wound culture taken  -Right foot xray shows charcot changes to midfoot, no gas, no OM   -Recommend IV Vanc/Zosyn  -Aquacell and DSD applied to right foot   -No acute surgical intervention at this time  -Pod plan [facility]WC   -Discussed with attending

## 2020-11-10 NOTE — DISCHARGE NOTE PROVIDER - NSDCFUADDINST_GEN_ALL_CORE_FT
Podiatry Discharge Instructions:  Follow up: Please follow up with Dr. Riley or your own podiatrist within 1 week of discharge from the hospital, please call [phone] ([place]) or [phone] ([place]) for appointment and discuss that you recently were seen in the hospital.  Wound Care: Please apply santyl, aquacel, 4x4 gauze, abd pad, and yasir to R foot plantar wound daily  Weight bearing: Please weight bear as tolerated in a surgical shoe.  Antibiotics: Please continue as instructed. Podiatry Discharge Instructions:  Follow up: Please follow up with Dr. Riley or your own podiatrist within 1 week of discharge from the hospital, please call [phone] ([place]) or [phone] ([place]) for appointment and discuss that you recently were seen in the hospital.  Wound Care: Please apply santyl, aquacel, 4x4 gauze, abd pad, and yasir to R foot plantar wound daily  Weight bearing: Please weight bear as tolerated in a surgical shoe.

## 2020-11-10 NOTE — DISCHARGE NOTE PROVIDER - NSDCMRMEDTOKEN_GEN_ALL_CORE_FT
Aspirin Enteric Coated 81 mg oral delayed release tablet: 1 tab(s) orally once a day  clonazePAM 0.5 mg oral tablet: 1 tab(s) orally once a day (at bedtime), As Needed  furosemide 40 mg oral tablet: 1 tab(s) orally once a day  hydrocodone-acetaminophen 7.5 mg-325 mg oral tablet: 1 tab(s) orally 3 times a day, As Needed  Levemir FlexPen 100 units/mL subcutaneous solution: 72 unit(s) subcutaneous 2 times a day  lisinopril 5 mg oral tablet: 1 tab(s) orally once a day  NovoLOG FlexPen 100 units/mL injectable solution: 15 unit(s) injectable 3 times a day  ondansetron 4 mg oral tablet: 1 tab(s) orally every 8 hours, As Needed  oxyCODONE 80 mg oral tablet, extended release: 1 tab(s) orally every 12 hours  rosuvastatin 20 mg oral tablet: 1 tab(s) orally once a day  supplemental oxygen: 3L NC   Aspirin Enteric Coated 81 mg oral delayed release tablet: 1 tab(s) orally once a day  clonazePAM 0.5 mg oral tablet: 1 tab(s) orally once a day (at bedtime), As Needed  collagenase 250 units/g topical ointment: 1 application topically once a day  R foot  furosemide 40 mg oral tablet: 1 tab(s) orally once a day  Levemir FlexPen 100 units/mL subcutaneous solution: 72 unit(s) subcutaneous 2 times a day  lisinopril 5 mg oral tablet: 1 tab(s) orally once a day  Multiple Vitamins oral tablet: 1 tab(s) orally once a day  NovoLOG FlexPen 100 units/mL injectable solution: 15 unit(s) injectable 3 times a day  nystatin 100,000 units/g topical powder: 1 application topically 2 times a day  ocular lubricant ophthalmic solution: 1 drop(s) to each affected eye every 6 hours  oxyCODONE 5 mg oral tablet: 1 tab(s) orally every 8 hours, As needed, Breakthrough Pain  oxyCODONE 80 mg oral tablet, extended release: 1 tab(s) orally every 12 hours  petrolatum topical ointment: 1 application topically 2 times a day  polyethylene glycol 3350 oral powder for reconstitution: 17 gram(s) orally once a day  rosuvastatin 20 mg oral tablet: 1 tab(s) orally once a day  senna oral tablet: 2 tab(s) orally once a day (at bedtime)   Aspirin Enteric Coated 81 mg oral delayed release tablet: 1 tab(s) orally once a day  clonazePAM 0.5 mg oral tablet: 1 tab(s) orally once a day (at bedtime), As Needed  collagenase 250 units/g topical ointment: 1 application topically once a day  R foot  furosemide 40 mg oral tablet: 1 tab(s) orally once a day  Levemir FlexPen 100 units/mL subcutaneous solution: 60 unit(s) subcutaneous once a day  in the morning  Levemir FlexPen 100 units/mL subcutaneous solution: 70 unit(s) subcutaneous once a day (at bedtime)  Multiple Vitamins oral tablet: 1 tab(s) orally once a day  NovoLOG FlexPen 100 units/mL injectable solution: 15 unit(s) injectable 3 times a day  nystatin 100,000 units/g topical powder: 1 application topically 2 times a day  ocular lubricant ophthalmic solution: 1 drop(s) to each affected eye every 6 hours  oxyCODONE 5 mg oral tablet: 1 tab(s) orally every 8 hours, As needed, Breakthrough Pain  oxyCODONE 80 mg oral tablet, extended release: 1 tab(s) orally every 12 hours  petrolatum topical ointment: 1 application topically 2 times a day  polyethylene glycol 3350 oral powder for reconstitution: 17 gram(s) orally once a day  rosuvastatin 20 mg oral tablet: 1 tab(s) orally once a day  senna oral tablet: 2 tab(s) orally once a day (at bedtime)

## 2020-11-10 NOTE — PROGRESS NOTE ADULT - ASSESSMENT
65F with right plantar midfoot wound 2/2 Charcot deformity   -pt seen and evaluated   -Right plantar midfoot wound to subq, no purulence, no fluctuance, mild malodor, hyperkeratotic borders, unimproved erythema of R dorsal and plantar forefoot  -No acute surgical intervention at this time  -Pod plan local wound care  -seen with attending

## 2020-11-10 NOTE — PROGRESS NOTE ADULT - PROBLEM SELECTOR PLAN 1
History of chronic wound and charcot foot with concern for overlying infection due to new erythema and reported drainage  - Continue with empiric antibiotics till wound culture  - Zosyn 3.375 gm q8hr*  - Vancomycin 2 gm q12hr*. Redose per vanc trough  - Podiatry consult - debrided at bedside and cultures obtained. Follow up wound cultures  - DVT US - unable to visualize R common femoral, but otherwise negative  - repeat arterial studies ordered

## 2020-11-10 NOTE — DISCHARGE NOTE PROVIDER - NSDCCPCAREPLAN_GEN_ALL_CORE_FT
PRINCIPAL DISCHARGE DIAGNOSIS  Diagnosis: Cellulitis of foot, right  Assessment and Plan of Treatment: Treated with antibiotic. Bone culture result is negative for infection.  Follow up with podiatry as recommended. See the instructions.      SECONDARY DISCHARGE DIAGNOSES  Diagnosis: Chronic diastolic heart failure  Assessment and Plan of Treatment: Weigh yourself daily.  If you gain 3lbs in 3 days, or 5lbs in a week call your Health Care Provider.  Do not eat or drink foods containing more than 2000mg of salt (sodium) in your diet every day.  Call your Health Care Provider if you have any swelling or increased swelling in your feet, ankles, and/or stomach.  Take all of your medication as directed.  If you become dizzy call your Health Care Provider.      Diagnosis: Pulmonary hypertension  Assessment and Plan of Treatment: Recent diagnosis, possibly from undiagnosed sleep apnea.   - Echo from [place] showing moderate pulmonary hypertension  - Repeat ECHO  here was difficult study with grossly preserved EF   -Needs outpatient sleep study.   Follow up with your PMD/pulmonary doctor.      Diagnosis: Type 2 diabetes mellitus with hyperglycemia, with long-term current use of insulin  Assessment and Plan of Treatment: HgA1C this admission.  Make sure you get your HgA1c checked every three months.  If you take oral diabetes medications, check your blood glucose two times a day.  If you take insulin, check your blood glucose before meals and at bedtime.  It's important not to skip any meals.  Keep a log of your blood glucose results and always take it with you to your doctor appointments.  Keep a list of your current medications including injectables and over the counter medications and bring this medication list with you to all your doctor appointments.  If you have not seen your ophthalmologist this year call for appointment.  Check your feet daily for redness, sores, or openings. Do not self treat. If no improvement in two days call your primary care physician for an appointment.  Low blood sugar (hypoglycemia) is a blood sugar below 70mg/dl. Check your blood sugar if you feel signs/symptoms of hypoglycemia. If your blood sugar is below 70 take 15 grams of carbohydrates (ex 4 oz of apple juice, 3-4 glucose tablets, or 4-6 oz of regular soda) wait 15 minutes and repeat blood sugar to make sure it comes up above 70.  If your blood sugar is above 70 and you are due for a meal, have a meal.  If you are not due for a meal have a snack.  This snack helps keeps your blood sugar at a safe range.      Diagnosis: Chronic low back pain, unspecified back pain laterality, unspecified whether sciatica present  Assessment and Plan of Treatment: Continue current pain medications and follow up with your pain management doctor.     PRINCIPAL DISCHARGE DIAGNOSIS  Diagnosis: Cellulitis of foot, right  Assessment and Plan of Treatment: Treated with antibiotic. Bone culture result is negative for infection.  Follow up with podiatry as recommended. See the instructions.      SECONDARY DISCHARGE DIAGNOSES  Diagnosis: Chronic diastolic heart failure  Assessment and Plan of Treatment: Weigh yourself daily.  If you gain 3lbs in 3 days, or 5lbs in a week call your Health Care Provider.  Do not eat or drink foods containing more than 2000mg of salt (sodium) in your diet every day.  Call your Health Care Provider if you have any swelling or increased swelling in your feet, ankles, and/or stomach.  Take all of your medication as directed.  If you become dizzy call your Health Care Provider.      Diagnosis: Pulmonary hypertension  Assessment and Plan of Treatment: Recent diagnosis, possibly from undiagnosed sleep apnea.   - Echo from [place] showing moderate pulmonary hypertension  - Repeat ECHO  here was difficult study with grossly preserved EF   -Needs outpatient sleep study.   Follow up with your PMD/pulmonary doctor.      Diagnosis: Type 2 diabetes mellitus with hyperglycemia, with long-term current use of insulin  Assessment and Plan of Treatment: HgA1C this admission 10.4  Make sure you get your HgA1c checked every three months.  If you take oral diabetes medications, check your blood glucose two times a day.  If you take insulin, check your blood glucose before meals and at bedtime.  It's important not to skip any meals.  Keep a log of your blood glucose results and always take it with you to your doctor appointments.  Keep a list of your current medications including injectables and over the counter medications and bring this medication list with you to all your doctor appointments.  If you have not seen your ophthalmologist this year call for appointment.  Check your feet daily for redness, sores, or openings. Do not self treat. If no improvement in two days call your primary care physician for an appointment.  Low blood sugar (hypoglycemia) is a blood sugar below 70mg/dl. Check your blood sugar if you feel signs/symptoms of hypoglycemia. If your blood sugar is below 70 take 15 grams of carbohydrates (ex 4 oz of apple juice, 3-4 glucose tablets, or 4-6 oz of regular soda) wait 15 minutes and repeat blood sugar to make sure it comes up above 70.  If your blood sugar is above 70 and you are due for a meal, have a meal.  If you are not due for a meal have a snack.  This snack helps keeps your blood sugar at a safe range.      Diagnosis: Chronic kidney disease, unspecified CKD stage  Assessment and Plan of Treatment: Avoid taking (NSAIDs) - (ex: Ibuprofen, Advil, Celebrex, Naprosyn)  Avoid taking any nephrotoxic agents (can harm kidneys) - Intravenous contrast for diagnostic testing, combination cold medications.  Have all medications adjusted for your renal function by your Health Care Provider.  Blood pressure control is important.  Take all medication as prescribed.      Diagnosis: Chronic low back pain, unspecified back pain laterality, unspecified whether sciatica present  Assessment and Plan of Treatment: Continue current pain medications and follow up with your pain management doctor.     PRINCIPAL DISCHARGE DIAGNOSIS  Diagnosis: Cellulitis of foot, right  Assessment and Plan of Treatment: Treated with antibiotic. Bone culture result is negative for infection.  Follow up with podiatry as recommended. See the instructions.      SECONDARY DISCHARGE DIAGNOSES  Diagnosis: Chronic diastolic heart failure  Assessment and Plan of Treatment: Weigh yourself daily.  If you gain 3lbs in 3 days, or 5lbs in a week call your Health Care Provider.  Do not eat or drink foods containing more than 2000mg of salt (sodium) in your diet every day.  Call your Health Care Provider if you have any swelling or increased swelling in your feet, ankles, and/or stomach.  Take all of your medication as directed.  If you become dizzy call your Health Care Provider.      Diagnosis: Pulmonary hypertension  Assessment and Plan of Treatment: Recent diagnosis, possibly from undiagnosed sleep apnea.   - Echo from [place] showing moderate pulmonary hypertension  - Repeat ECHO  here was difficult study with grossly preserved EF   -Needs outpatient sleep study.   Follow up with your PMD/pulmonary doctor.      Diagnosis: Type 2 diabetes mellitus with hyperglycemia, with long-term current use of insulin  Assessment and Plan of Treatment: HgA1C this admission 10.4  Make sure you get your HgA1c checked every three months.  If you take oral diabetes medications, check your blood glucose two times a day.  If you take insulin, check your blood glucose before meals and at bedtime.  It's important not to skip any meals.  Keep a log of your blood glucose results and always take it with you to your doctor appointments.  Keep a list of your current medications including injectables and over the counter medications and bring this medication list with you to all your doctor appointments.  If you have not seen your ophthalmologist this year call for appointment.  Check your feet daily for redness, sores, or openings. Do not self treat. If no improvement in two days call your primary care physician for an appointment.  Low blood sugar (hypoglycemia) is a blood sugar below 70mg/dl. Check your blood sugar if you feel signs/symptoms of hypoglycemia. If your blood sugar is below 70 take 15 grams of carbohydrates (ex 4 oz of apple juice, 3-4 glucose tablets, or 4-6 oz of regular soda) wait 15 minutes and repeat blood sugar to make sure it comes up above 70.  If your blood sugar is above 70 and you are due for a meal, have a meal.  If you are not due for a meal have a snack.  This snack helps keeps your blood sugar at a safe range.  Continue levemir at current dose, as per endocrine who saw you here  They already spoke with your PCP in regards to changing to another type of insulin. Follow up with your PCP.      Diagnosis: Chronic kidney disease, unspecified CKD stage  Assessment and Plan of Treatment: Avoid taking (NSAIDs) - (ex: Ibuprofen, Advil, Celebrex, Naprosyn)  Avoid taking any nephrotoxic agents (can harm kidneys) - Intravenous contrast for diagnostic testing, combination cold medications.  Have all medications adjusted for your renal function by your Health Care Provider.  Blood pressure control is important.  Take all medication as prescribed.      Diagnosis: Chronic low back pain, unspecified back pain laterality, unspecified whether sciatica present  Assessment and Plan of Treatment: Continue current pain medications and follow up with your pain management doctor.

## 2020-11-10 NOTE — DISCHARGE NOTE PROVIDER - HOSPITAL COURSE
Patient is a 65 year old woman with PMH morbid obesity, asthma, CHF, pulmonary HTN, home on 3L NC, T2DM, CKD, HTN, diabetic neuropathy, Charcot foot, chronic venous statis and chronic right foot wound, chronic back pain with spinal stenosis, who presents with worsening right foot wound and drainage not improved on augmentin concerning for diabetic foot wound      Patient is a 65 year old woman with PMH morbid obesity, asthma, CHF, pulmonary HTN, home on 3L NC, T2DM, CKD, HTN, diabetic neuropathy, Charcot foot, chronic venous statis and chronic right foot wound, chronic back pain with spinal stenosis, who presents with worsening right foot wound and drainage not improved on augmentin concerning for diabetic foot wound .  Started on Unasyn. Seen by podiatry and ID. S/P debrided at bedside, wound Cx with staph aureus, likely contaminant. NM scan done, concerned for OM, bone biopsy  came back normal. Abx stopped. Noted hypoxia, likely secondary to cardiac and pulmonary etiology (Pulm  HTN, Asthma)now resolved. May need out patient  sleep study.      ·  Problem: Diabetic foot infection.  Plan: History of chronic wound and charcot foot with concern for overlying infection due to new erythema and reported drainage  - c/w Unasyn, per ID  - Podiatry consult - debrided at bedside and cultures obtained. Follow up wound cultures - Staph aureus and contaminant.   - DVT US - unable to visualize R common femoral, but otherwise negative  - repeat arterial studies ordered which show mild decrease in flow, appreciate vascular recs   -MRI foot not done as machine unable to accommodate patient, can get CT of the foot with contrast, once cleared by renal (consult placed) - patient however now refuses IV contrast over kidney concerns.   -NM bone scan done, concerning for midfoot osteomyelitis, now s/p bone biopsy  -f/u bone biopsy results which will determine the total length of abx treatment  -prelim bone biopsy cultures with no growth. Bone path biopsy results neg as well  -will f/u with ID, likely no abx  -continue with wound care to the area and outpatient vascular follow up.      Problem/Plan - 2:  ·  Problem: Hypoxia.  Plan: Patient on home O2 likely secondary to cardiac and pulmonary etiologies (pulmonary HTN and asthma)  - supplemental O2, wean as tolerated.  -Needs a sleep study outpatient.      Problem/Plan - 3:  ·  Problem: Pulmonary hypertension.  Plan: Recent diagnosis, possibly from undiagnosed sleep apnea.   - Echo from [place] showing moderate pulmonary hypertension  - Repeat TTE here was difficult study with grossly preserved EF   -Needs outpatient sleep study.      Problem/Plan - 4:  ·  Problem: Chronic diastolic heart failure.  Plan: Recent diagnosis  - Echo from [place] showed EF off 55-60%, grade II diastolic dysfunction  - Stress test from [place] was negative for ischemia  - Atorvastatin 80 mg (home on Rosuvastatin)  - will resume 40mg PO lasix daily   - monitor volume status, appears compensated today   - f/u with cards as an outpatient.      Problem/Plan - 5:  ·  Problem: Asthma, unspecified asthma severity, unspecified whether complicated, unspecified whether persistent.  Plan: - Duonebs q6hr.      Problem/Plan - 6:  Problem: Type 2 diabetes mellitus with foot ulcer, with long-term current use of insulin. Plan: - Continue home Lantus 72 units qAM and qHS (endo increased to 80units BID)  - Continue home Lispro 15 units premeal (endo increased to 22units with meals)  - SSI  -A1C is elevated, endocrine consulted  -Moderate SS.     Patient is a 65 year old woman with PMH morbid obesity, asthma, CHF, pulmonary HTN, home on 3L NC, T2DM, CKD, HTN, diabetic neuropathy, Charcot foot, chronic venous statis and chronic right foot wound, chronic back pain with spinal stenosis, who presents with worsening right foot wound and drainage not improved on augmentin concerning for diabetic foot wound .  Started on Unasyn. Seen by podiatry and ID. S/P debrided at bedside, wound Cx with staph aureus, likely contaminant. NM scan done, concerned for OM, bone biopsy  came back normal. Abx stopped. Noted hypoxia, likely secondary to cardiac and pulmonary etiology (Pulm  HTN, Asthma)now resolved with supplemental oxygen. May need out patient  sleep study. Cleared for discharge , medication reconciliation DW Dr Meléndez.      ·   Patient is a 65 year old woman with PMH morbid obesity, asthma, CHF, pulmonary HTN, home on 3L NC, T2DM, CKD, HTN, diabetic neuropathy, Charcot foot, chronic venous statis and chronic right foot wound, chronic back pain with spinal stenosis, who presents with worsening right foot wound and drainage not improved on augmentin concerning for diabetic foot wound .  Started on Unasyn. Seen by podiatry and ID. S/P debrided at bedside, wound Cx with staph aureus, likely contaminant. NM scan done, concerned for OM, bone biopsy  came back normal. Abx stopped. Noted hypoxia, likely secondary to cardiac and pulmonary etiology (Pulm  HTN, Asthma)now resolved with supplemental oxygen. May need out patient  sleep study. Insulin ordered as recommended by endocrine NP over phone, Cleared for discharge , medication reconciliation DW Dr Meléndez.      ·

## 2020-11-11 LAB
ANION GAP SERPL CALC-SCNC: 13 MMOL/L — SIGNIFICANT CHANGE UP (ref 5–17)
BUN SERPL-MCNC: 35 MG/DL — HIGH (ref 7–23)
CALCIUM SERPL-MCNC: 9.9 MG/DL — SIGNIFICANT CHANGE UP (ref 8.4–10.5)
CHLORIDE SERPL-SCNC: 97 MMOL/L — SIGNIFICANT CHANGE UP (ref 96–108)
CO2 SERPL-SCNC: 29 MMOL/L — SIGNIFICANT CHANGE UP (ref 22–31)
CREAT SERPL-MCNC: 2.02 MG/DL — HIGH (ref 0.5–1.3)
GLUCOSE BLDC GLUCOMTR-MCNC: 161 MG/DL — HIGH (ref 70–99)
GLUCOSE BLDC GLUCOMTR-MCNC: 217 MG/DL — HIGH (ref 70–99)
GLUCOSE BLDC GLUCOMTR-MCNC: 252 MG/DL — HIGH (ref 70–99)
GLUCOSE BLDC GLUCOMTR-MCNC: 259 MG/DL — HIGH (ref 70–99)
GLUCOSE SERPL-MCNC: 190 MG/DL — HIGH (ref 70–99)
HCT VFR BLD CALC: 46.3 % — HIGH (ref 34.5–45)
HGB BLD-MCNC: 14.3 G/DL — SIGNIFICANT CHANGE UP (ref 11.5–15.5)
MCHC RBC-ENTMCNC: 29.5 PG — SIGNIFICANT CHANGE UP (ref 27–34)
MCHC RBC-ENTMCNC: 30.9 GM/DL — LOW (ref 32–36)
MCV RBC AUTO: 95.7 FL — SIGNIFICANT CHANGE UP (ref 80–100)
NRBC # BLD: 0 /100 WBCS — SIGNIFICANT CHANGE UP (ref 0–0)
PLATELET # BLD AUTO: 203 K/UL — SIGNIFICANT CHANGE UP (ref 150–400)
POTASSIUM SERPL-MCNC: 4.8 MMOL/L — SIGNIFICANT CHANGE UP (ref 3.5–5.3)
POTASSIUM SERPL-SCNC: 4.8 MMOL/L — SIGNIFICANT CHANGE UP (ref 3.5–5.3)
RBC # BLD: 4.84 M/UL — SIGNIFICANT CHANGE UP (ref 3.8–5.2)
RBC # FLD: 13.7 % — SIGNIFICANT CHANGE UP (ref 10.3–14.5)
SARS-COV-2 IGG SERPL QL IA: NEGATIVE — SIGNIFICANT CHANGE UP
SARS-COV-2 IGM SERPL IA-ACNC: <0.1 INDEX — SIGNIFICANT CHANGE UP
SODIUM SERPL-SCNC: 139 MMOL/L — SIGNIFICANT CHANGE UP (ref 135–145)
VANCOMYCIN TROUGH SERPL-MCNC: 19.6 UG/ML — SIGNIFICANT CHANGE UP (ref 10–20)
WBC # BLD: 9.25 K/UL — SIGNIFICANT CHANGE UP (ref 3.8–10.5)
WBC # FLD AUTO: 9.25 K/UL — SIGNIFICANT CHANGE UP (ref 3.8–10.5)

## 2020-11-11 PROCEDURE — 99223 1ST HOSP IP/OBS HIGH 75: CPT | Mod: GC

## 2020-11-11 PROCEDURE — 99233 SBSQ HOSP IP/OBS HIGH 50: CPT

## 2020-11-11 RX ORDER — VANCOMYCIN HCL 1 G
2000 VIAL (EA) INTRAVENOUS EVERY 12 HOURS
Refills: 0 | Status: DISCONTINUED | OUTPATIENT
Start: 2020-11-11 | End: 2020-11-12

## 2020-11-11 RX ORDER — HEPARIN SODIUM 5000 [USP'U]/ML
7500 INJECTION INTRAVENOUS; SUBCUTANEOUS EVERY 8 HOURS
Refills: 0 | Status: DISCONTINUED | OUTPATIENT
Start: 2020-11-11 | End: 2020-11-25

## 2020-11-11 RX ADMIN — Medication 3 MILLILITER(S): at 05:45

## 2020-11-11 RX ADMIN — PIPERACILLIN AND TAZOBACTAM 25 GRAM(S): 4; .5 INJECTION, POWDER, LYOPHILIZED, FOR SOLUTION INTRAVENOUS at 10:34

## 2020-11-11 RX ADMIN — Medication 15 UNIT(S): at 08:29

## 2020-11-11 RX ADMIN — CHLORHEXIDINE GLUCONATE 1 APPLICATION(S): 213 SOLUTION TOPICAL at 13:49

## 2020-11-11 RX ADMIN — Medication 250 MILLIGRAM(S): at 22:44

## 2020-11-11 RX ADMIN — INSULIN GLARGINE 72 UNIT(S): 100 INJECTION, SOLUTION SUBCUTANEOUS at 08:29

## 2020-11-11 RX ADMIN — ATORVASTATIN CALCIUM 80 MILLIGRAM(S): 80 TABLET, FILM COATED ORAL at 22:53

## 2020-11-11 RX ADMIN — OXYCODONE HYDROCHLORIDE 5 MILLIGRAM(S): 5 TABLET ORAL at 14:26

## 2020-11-11 RX ADMIN — OXYCODONE HYDROCHLORIDE 80 MILLIGRAM(S): 5 TABLET ORAL at 05:45

## 2020-11-11 RX ADMIN — Medication 3: at 17:19

## 2020-11-11 RX ADMIN — NYSTATIN CREAM 1 APPLICATION(S): 100000 CREAM TOPICAL at 18:40

## 2020-11-11 RX ADMIN — Medication 15 UNIT(S): at 12:22

## 2020-11-11 RX ADMIN — Medication 1: at 22:54

## 2020-11-11 RX ADMIN — PIPERACILLIN AND TAZOBACTAM 25 GRAM(S): 4; .5 INJECTION, POWDER, LYOPHILIZED, FOR SOLUTION INTRAVENOUS at 00:06

## 2020-11-11 RX ADMIN — ENOXAPARIN SODIUM 40 MILLIGRAM(S): 100 INJECTION SUBCUTANEOUS at 12:22

## 2020-11-11 RX ADMIN — Medication 81 MILLIGRAM(S): at 12:22

## 2020-11-11 RX ADMIN — Medication 3 MILLILITER(S): at 00:06

## 2020-11-11 RX ADMIN — Medication 15 UNIT(S): at 17:19

## 2020-11-11 RX ADMIN — OXYCODONE HYDROCHLORIDE 5 MILLIGRAM(S): 5 TABLET ORAL at 22:53

## 2020-11-11 RX ADMIN — OXYCODONE HYDROCHLORIDE 80 MILLIGRAM(S): 5 TABLET ORAL at 05:46

## 2020-11-11 RX ADMIN — OXYCODONE HYDROCHLORIDE 5 MILLIGRAM(S): 5 TABLET ORAL at 23:23

## 2020-11-11 RX ADMIN — Medication 1 APPLICATION(S): at 13:48

## 2020-11-11 RX ADMIN — Medication 3 MILLILITER(S): at 12:22

## 2020-11-11 RX ADMIN — INSULIN GLARGINE 72 UNIT(S): 100 INJECTION, SOLUTION SUBCUTANEOUS at 22:53

## 2020-11-11 RX ADMIN — OXYCODONE HYDROCHLORIDE 80 MILLIGRAM(S): 5 TABLET ORAL at 18:45

## 2020-11-11 RX ADMIN — HEPARIN SODIUM 7500 UNIT(S): 5000 INJECTION INTRAVENOUS; SUBCUTANEOUS at 22:53

## 2020-11-11 RX ADMIN — Medication 3 MILLILITER(S): at 18:41

## 2020-11-11 RX ADMIN — PIPERACILLIN AND TAZOBACTAM 25 GRAM(S): 4; .5 INJECTION, POWDER, LYOPHILIZED, FOR SOLUTION INTRAVENOUS at 18:40

## 2020-11-11 RX ADMIN — OXYCODONE HYDROCHLORIDE 5 MILLIGRAM(S): 5 TABLET ORAL at 15:26

## 2020-11-11 RX ADMIN — Medication 2: at 12:21

## 2020-11-11 RX ADMIN — Medication 1: at 08:29

## 2020-11-11 RX ADMIN — Medication 0.5 MILLIGRAM(S): at 22:53

## 2020-11-11 NOTE — PROGRESS NOTE ADULT - SUBJECTIVE AND OBJECTIVE BOX
Patient is a 65y old  Female who presents with a chief complaint of     SUBJECTIVE / OVERNIGHT EVENTS: no acute events overnight     MEDICATIONS  (STANDING):  albuterol/ipratropium for Nebulization. 3 milliLiter(s) Nebulizer every 6 hours  aspirin enteric coated 81 milliGRAM(s) Oral daily  atorvastatin 80 milliGRAM(s) Oral at bedtime  chlorhexidine 2% Cloths 1 Application(s) Topical daily  collagenase Ointment 1 Application(s) Topical daily  dextrose 5%. 1000 milliLiter(s) (50 mL/Hr) IV Continuous <Continuous>  dextrose 50% Injectable 12.5 Gram(s) IV Push once  dextrose 50% Injectable 25 Gram(s) IV Push once  dextrose 50% Injectable 25 Gram(s) IV Push once  enoxaparin Injectable 40 milliGRAM(s) SubCutaneous daily  influenza   Vaccine 0.5 milliLiter(s) IntraMuscular once  insulin glargine Injectable (LANTUS) 72 Unit(s) SubCutaneous every morning  insulin glargine Injectable (LANTUS) 72 Unit(s) SubCutaneous at bedtime  insulin lispro (ADMELOG) corrective regimen sliding scale   SubCutaneous three times a day before meals  insulin lispro (ADMELOG) corrective regimen sliding scale   SubCutaneous at bedtime  insulin lispro Injectable (ADMELOG) 15 Unit(s) SubCutaneous three times a day before meals  nystatin Powder 1 Application(s) Topical two times a day  oxyCODONE  ER Tablet 80 milliGRAM(s) Oral every 12 hours  piperacillin/tazobactam IVPB.. 3.375 Gram(s) IV Intermittent every 8 hours  vancomycin  IVPB 2000 milliGRAM(s) IV Intermittent every 12 hours    MEDICATIONS  (PRN):  clonazePAM  Tablet 0.5 milliGRAM(s) Oral at bedtime PRN ancxiety  dextrose 40% Gel 15 Gram(s) Oral once PRN Blood Glucose LESS THAN 70 milliGRAM(s)/deciliter  glucagon  Injectable 1 milliGRAM(s) IntraMuscular once PRN Glucose LESS THAN 70 milligrams/deciliter  oxyCODONE    IR 5 milliGRAM(s) Oral every 8 hours PRN Breakthrough Pain      Vital Signs Last 24 Hrs  T(C): 36.5 (11 Nov 2020 10:19), Max: 36.7 (10 Nov 2020 16:00)  T(F): 97.7 (11 Nov 2020 10:19), Max: 98 (10 Nov 2020 16:00)  HR: 83 (11 Nov 2020 10:19) (75 - 83)  BP: 142/73 (11 Nov 2020 10:19) (93/61 - 142/73)  BP(mean): --  RR: 18 (11 Nov 2020 10:19) (18 - 18)  SpO2: 95% (11 Nov 2020 10:19) (95% - 100%)  CAPILLARY BLOOD GLUCOSE      POCT Blood Glucose.: 217 mg/dL (11 Nov 2020 12:10)  POCT Blood Glucose.: 161 mg/dL (11 Nov 2020 08:09)  POCT Blood Glucose.: 268 mg/dL (10 Nov 2020 21:17)  POCT Blood Glucose.: 257 mg/dL (10 Nov 2020 17:18)    I&O's Summary    10 Nov 2020 07:01  -  11 Nov 2020 07:00  --------------------------------------------------------  IN: 360 mL / OUT: 1150 mL / NET: -790 mL    11 Nov 2020 07:01  -  11 Nov 2020 13:36  --------------------------------------------------------  IN: 200 mL / OUT: 0 mL / NET: 200 mL        PHYSICAL EXAM:  GENERAL: NAD  EYES: conjunctiva and sclera clear  CHEST/LUNG: Clear to auscultation bilaterally; No wheeze  HEART: +S1/S2, reg   ABDOMEN: Soft, Nontender, Nondistended  EXTREMITIES: trace edema of b/l LE, dressing in place on R foot   PSYCH: AAOx3      LABS:                        14.3   9.25  )-----------( 203      ( 11 Nov 2020 09:20 )             46.3     11-11    139  |  97  |  35<H>  ----------------------------<  190<H>  4.8   |  29  |  2.02<H>    Ca    9.9      11 Nov 2020 09:20  Phos  4.2     11-10  Mg     2.1     11-10    TPro  7.1  /  Alb  4.3  /  TBili  0.6  /  DBili  x   /  AST  16  /  ALT  16  /  AlkPhos  96  11-09    PT/INR - ( 09 Nov 2020 17:45 )   PT: 11.7 sec;   INR: 0.97 ratio         PTT - ( 09 Nov 2020 17:45 )  PTT:27.7 sec

## 2020-11-11 NOTE — CONSULT NOTE ADULT - ASSESSMENT
65 year old woman with PMH morbid obesity, asthma, CHF, pulmonary HTN, home on 3L NC, T2DM, CKD, HTN, diabetic neuropathy, Charcot foot, chronic venous statis and chronic right foot wound, chronic back pain with spinal stenosis, who presents with worsening right foot wound. There is an ulcer wound on the right foot. No active drainage. Pt had hx of MSSA and polymicrobial on the wound culture before.    #Right foot infection  - MRI of the right foot to r/o osteomyelitis  - Keep Zosyn for now  - No hx of MRSA, can d/c vancomycin    Josiah Kwong MD, PGY4   ID fellow  Pager: [phone]  After 5pm/weekends call [phone]    d/w Dr. Chantelle Hurtado conveyed to primary team

## 2020-11-11 NOTE — PROGRESS NOTE ADULT - ASSESSMENT
65F with right plantar midfoot wound 2/2 Charcot deformity   -pt seen and evaluated   -Right plantar midfoot wound to subq, no purulence, no fluctuance, mild malodor, hyperkeratotic borders, unimproved erythema of R dorsal and plantar forefoot  -New erythema stemming from plantar ulceration along medial arch with ++pain.   -Ordered MRI right foot to r/o abscess  -No acute surgical intervention at this time  -Pod plan local wound care for now pending MRI results  -Cont IV abx  -Will follow 65F with right plantar midfoot wound 2/2 Charcot deformity   -pt seen and evaluated   -Right plantar midfoot wound to subq, no purulence, no fluctuance, mild malodor, hyperkeratotic borders, unimproved erythema of R dorsal and plantar forefoot  -New erythema stemming from plantar ulceration along medial arch with ++pain.   -Ordered MRI right foot to r/o abscess  -ABIs diminished b/l - rec vasc sx consult  -No acute surgical intervention at this time  -Pod plan local wound care for now pending MRI results  -Cont IV abx  -Will follow

## 2020-11-11 NOTE — PROGRESS NOTE ADULT - SUBJECTIVE AND OBJECTIVE BOX
Podiatry pager #: [phone] ([place])/ [phone] ([facility])    Patient is a 65y old  Female who presents with a chief complaint of      INTERVAL HPI/OVERNIGHT EVENTS:  Patient seen and evaluated at bedside.  Pt is resting comfortable in NAD. Denies N/V/F/C.     Allergies    adhesives (Rash)  latex (Rash)  No Known Drug Allergies  wool- rash, itch (Other)    Intolerances        Vital Signs Last 24 Hrs  T(C): 36.7 (10 Nov 2020 16:00), Max: 36.8 (09 Nov 2020 20:23)  T(F): 98 (10 Nov 2020 16:00), Max: 98.3 (09 Nov 2020 21:18)  HR: 75 (10 Nov 2020 16:00) (69 - 97)  BP: 99/60 (10 Nov 2020 16:00) (96/56 - 116/68)  BP(mean): --  RR: 18 (10 Nov 2020 16:00) (18 - 19)  SpO2: 99% (10 Nov 2020 16:00) (94% - 100%)    LABS:                        13.8   9.59  )-----------( 220      ( 10 Nov 2020 12:36 )             44.3     11-10    136  |  95<L>  |  34<H>  ----------------------------<  247<H>  4.8   |  25  |  2.07<H>    Ca    9.8      10 Nov 2020 09:35  Phos  4.2     11-10  Mg     2.1     11-10    TPro  7.1  /  Alb  4.3  /  TBili  0.6  /  DBili  x   /  AST  16  /  ALT  16  /  AlkPhos  96  11-09    PT/INR - ( 09 Nov 2020 17:45 )   PT: 11.7 sec;   INR: 0.97 ratio         PTT - ( 09 Nov 2020 17:45 )  PTT:27.7 sec    CAPILLARY BLOOD GLUCOSE      POCT Blood Glucose.: 257 mg/dL (10 Nov 2020 17:18)  POCT Blood Glucose.: 260 mg/dL (10 Nov 2020 12:00)  POCT Blood Glucose.: 244 mg/dL (10 Nov 2020 07:58)  POCT Blood Glucose.: 268 mg/dL (09 Nov 2020 23:18)      Lower Extremity Physical Exam:      Vascular: DP/PT 1/4 B/L, CFT <3 seconds B/L, Temperature gradient warm to cool B/L  Neuro: Epicritic sensation diminished to the level of midfoot B/L  Musculoskeletal/Ortho: charcot foot deformity R, non-ambi  Skin: Right plantar midfoot wound to subq, no purulence, no fluctuance, mild malodor, hyperkeratotic borders, unimproved erythema of R dorsal and plantar forefoot      RADIOLOGY & ADDITIONAL TESTS:

## 2020-11-11 NOTE — PROGRESS NOTE ADULT - PROBLEM SELECTOR PLAN 1
History of chronic wound and charcot foot with concern for overlying infection due to new erythema and reported drainage  - Zosyn 3.375 gm q8hr*  - Vancomycin 2 gm q12hr*. Redose per vanc trough  - Podiatry consult - debrided at bedside and cultures obtained. Follow up wound cultures  - DVT US - unable to visualize R common femoral, but otherwise negative  - repeat arterial studies ordered which show mild decrease in flow, will get vascular consult  -MRI foot is pending

## 2020-11-11 NOTE — CONSULT NOTE ADULT - SUBJECTIVE AND OBJECTIVE BOX
[facility] Vascular Surgery Consultation     Patient is a 65y old  Female who presents with a chief complaint of     HPI:  65F w/ pmh morbid obesity, asthma, CHF, pulmonary HTN, home on 3L NC, T2DM, CKD, HTN, diabetic neuropathy, Charcot foot, chronic venous statis and chronic right foot wound, chronic back pain with spinal stenosis, who presents with worsening right foot wound. Patient reports having chronic foot wound since July that she is being managed by home wound care nurse. No recalled trauma that caused wound. She reports that it is being treated with aquacell and daily dressing changes with wound care nurse three times a week and her son (pre-med) on other days. She states that she has been on Augmentin the past two weeks after noticing some drainage, but had been improving since . On  during dressing change it was noted to have drainage and also erythema around the right foot which was new and was advised by PCP (Dr. Mccann) to come to ED for evaluation. She states that she does not bear weight on her foot. She denies fevers. She reports numbness on bilateral lower extremities up to the level of mid shin. Started on IV abx on admission and evaluated by podiatry. Vascular consulted for evaluation.    PAST MEDICAL & SURGICAL HISTORY:  CKD (chronic kidney disease)    Insomnia    Edema of lower extremity  on lasix    Vitamin D deficiency    Morbid Obesity    Cataract    Dyslipidemia    Deep Vein Thrombosis (DVT)  Left leg, , treated and resolved    Spinal Stenosis, Lumbar    Cervical Stenosis of Spine    Endometrial Hyperplasia    HTN (Hypertension)    Diabetes Mellitus Type II    S/P cholecystectomy    S/P appendectomy    S/P total abdominal hysterectomy and bilateral salpingo-oophorectomy    H/O colonoscopy      H/O laser iridotomy  left eye, 2016    Endometrial biopsy 09    Tonsillectomy as a child    Cervical Spinal Stenosis surgery x2 (2002, 2002)    D&amp;C 2008  hysteroscopy, endometrial hyperplasia, 2009    D&amp;C x2 &#x27;s    Cholecystectomy/appendectomy @ age 25    C Section     Cataract extracted with lens implant   Right        FAMILY HISTORY:  Family history of lung cancer (Grandparent)    Family history of bladder cancer    Family history of pancreatic cancer        SOCIAL HISTORY:    MEDICATIONS  (STANDING):  albuterol/ipratropium for Nebulization. 3 milliLiter(s) Nebulizer every 6 hours  aspirin enteric coated 81 milliGRAM(s) Oral daily  atorvastatin 80 milliGRAM(s) Oral at bedtime  chlorhexidine 2% Cloths 1 Application(s) Topical daily  collagenase Ointment 1 Application(s) Topical daily  dextrose 5%. 1000 milliLiter(s) (50 mL/Hr) IV Continuous <Continuous>  dextrose 50% Injectable 12.5 Gram(s) IV Push once  dextrose 50% Injectable 25 Gram(s) IV Push once  dextrose 50% Injectable 25 Gram(s) IV Push once  heparin   Injectable 7500 Unit(s) SubCutaneous every 8 hours  influenza   Vaccine 0.5 milliLiter(s) IntraMuscular once  insulin glargine Injectable (LANTUS) 72 Unit(s) SubCutaneous every morning  insulin glargine Injectable (LANTUS) 72 Unit(s) SubCutaneous at bedtime  insulin lispro (ADMELOG) corrective regimen sliding scale   SubCutaneous three times a day before meals  insulin lispro (ADMELOG) corrective regimen sliding scale   SubCutaneous at bedtime  insulin lispro Injectable (ADMELOG) 15 Unit(s) SubCutaneous three times a day before meals  nystatin Powder 1 Application(s) Topical two times a day  oxyCODONE  ER Tablet 80 milliGRAM(s) Oral every 12 hours  piperacillin/tazobactam IVPB.. 3.375 Gram(s) IV Intermittent every 8 hours    MEDICATIONS  (PRN):  clonazePAM  Tablet 0.5 milliGRAM(s) Oral at bedtime PRN ancxiety  dextrose 40% Gel 15 Gram(s) Oral once PRN Blood Glucose LESS THAN 70 milliGRAM(s)/deciliter  glucagon  Injectable 1 milliGRAM(s) IntraMuscular once PRN Glucose LESS THAN 70 milligrams/deciliter  oxyCODONE    IR 5 milliGRAM(s) Oral every 8 hours PRN Breakthrough Pain    Allergies    adhesives (Rash)  latex (Rash)  No Known Drug Allergies  wool- rash, itch (Other)    Intolerances        Vital Signs Last 24 Hrs  T(C): 36.7 (2020 14:20), Max: 36.7 (10 Nov 2020 16:00)  T(F): 98.1 (2020 14:20), Max: 98.1 (2020 14:20)  HR: 86 (2020 14:20) (75 - 86)  BP: 118/60 (2020 14:20) (93/61 - 142/73)  BP(mean): --  RR: 18 (2020 14:20) (18 - 18)  SpO2: 96% (2020 14:20) (95% - 100%)  Daily     Daily Weight in k.8 (2020 08:40)    General: NAD, Obese  HEENT: Atraumatic, EOMI  Resp: Breathing comfortably on RA  CV: Normal sinus rhythm  Ext: ROM limited by body habitus, bilateral lower extremity venous stasis skin changes, RLE: ~4cm plantar foot wound with eschar (no exposed tendon or bone), palpable PT pulse, dopplerable DP signal, LLE: dopplerable PT signal, palpable DP pulse                          14.3   9.25  )-----------( 203      ( 2020 09:20 )             46.3         139  |  97  |  35<H>  ----------------------------<  190<H>  4.8   |  29  |  2.02<H>    Ca    9.9      2020 09:20  Phos  4.2     11-10  Mg     2.1     -10    TPro  7.1  /  Alb  4.3  /  TBili  0.6  /  DBili  x   /  AST  16  /  ALT  16  /  AlkPhos  96  11-09    PT/INR - ( 2020 17:45 )   PT: 11.7 sec;   INR: 0.97 ratio         PTT - ( 2020 17:45 )  PTT:27.7 sec      Radiographic Findings:     < from: VA Duplex Lower Extrem Arterial, Bilat (11.10.20 @ 15:21) >  Impression: The quality of the examination was limited by limb girth which prevented imaging of the arteries of the right and left thighs.    The right and left ABIs are slightly decreased bilaterally.    The right and left popliteal arteries and trifurcation arteries are patent. No hemodynamically significant stenosis is identified.                JEAN ADAMS M.D., ATTENDING RADIOLOGIST  This document has been electronically signed. Nov 10 2020  4:45PM    < end of copied text >

## 2020-11-11 NOTE — CONSULT NOTE ADULT - SUBJECTIVE AND OBJECTIVE BOX
Patient is a 65y old  Female who presents with a chief complaint of   HPI:  Patient is a 65 year old woman with PMH morbid obesity, asthma, CHF, pulmonary HTN, home on 3L NC, T2DM, CKD, HTN, diabetic neuropathy, Charcot foot, chronic venous statis and chronic right foot wound, chronic back pain with spinal stenosis, who presents with worsening right foot wound. Patient reports having chronic foot wound since July that she is being managed by home wound care nurse. No recalled trauma that caused wound. She reports that it is being treated with aquacell and daily dressing changes with wound care nurse three times a week and her son (pre-med) on other days. She states that she has been on Augmentin the past two weeks after noticing some drainage, but had been improving since . On  during dressing change it was noted to have drainage and also erythema around the right foot which was new and was advised by PCP (Dr. Mccann) to come to ED for evaluation. She states that she does not bear weight on her foot. She denies fevers. She reports numbness on bilateral lower extremities up to the level of mid shin. Patient denies fevers.    She reports her diabetes has been difficult to manage with ongoing adjustments to her insulin regimen currently being made. She states that she usually is not in the 200s on her home BG checks but recently has been and that she is currently on Levemir 72 units twice a day and 15 units of Novolog prior to meals. She used to see a home visit podiatrist but stopped. She reports no recent ophthalmologic evaluation due to COVID.    Patient reports recent diagnosis of asthma this year. Also reports recent diagnosis of CHF and pulmonary HTN at [facility] this July after undergoing echo, stress test, and CT. Patient states that she was supposed to undergo cardiac catheterization but wanted to switch care to [facility] with Dr. Yung. She denies cough or early satiety. Reports having to sleep on 4 pillows at night. (2020 23:30)       prior hospital charts reviewed [V]  primary team notes reviewed [V]  other consultant notes reviewed [V]    PAST MEDICAL & SURGICAL HISTORY:  CKD (chronic kidney disease)    Insomnia    Edema of lower extremity  on lasix    Vitamin D deficiency    Morbid Obesity    Cataract    Dyslipidemia    Deep Vein Thrombosis (DVT)  Left leg, , treated and resolved    Spinal Stenosis, Lumbar    Cervical Stenosis of Spine    Endometrial Hyperplasia    HTN (Hypertension)    Diabetes Mellitus Type II    S/P cholecystectomy    S/P appendectomy    S/P total abdominal hysterectomy and bilateral salpingo-oophorectomy    H/O colonoscopy      H/O laser iridotomy  left eye, 2016    Endometrial biopsy 09    Tonsillectomy as a child    Cervical Spinal Stenosis surgery x2 (2002, 2002)    D&amp;C   hysteroscopy, endometrial hyperplasia, 2009    D&amp;C x2 1980&#x27;s    Cholecystectomy/appendectomy @ age 25    C Section     Cataract extracted with lens implant   Right        SOCIAL HISTORY:    - Denied smoking/vaping/alcohol/recreational drug use  - 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 Walking with walker    FAMILY HISTORY:  Mother-smoker, bladder cancer, and  of lung cancer  Grandmother-gyn cancer  Aunt-ovarian cancer        Allergies  Latex-rash, redness  Bactrim-rash        ANTIMICROBIALS:  piperacillin/tazobactam IVPB.. 3.375 every 8 hours      ANTIMICROBIALS (past 90 days):  MEDICATIONS  (STANDING):  piperacillin/tazobactam IVPB.   200 mL/Hr IV Intermittent (20 @ 17:40)    piperacillin/tazobactam IVPB..   25 mL/Hr IV Intermittent (20 @ 10:34)   25 mL/Hr IV Intermittent (20 @ 00:06)   25 mL/Hr IV Intermittent (11-10-20 @ 16:15)   25 mL/Hr IV Intermittent (11-10-20 @ 02:05)    vancomycin  IVPB   250 mL/Hr IV Intermittent (20 @ 23:42)    vancomycin  IVPB   250 mL/Hr IV Intermittent (11-10-20 @ 22:36)   250 mL/Hr IV Intermittent (11-10-20 @ 10:48)    vancomycin  IVPB   250 mL/Hr IV Intermittent (20 @ 19:03)        OTHER MEDS:   MEDICATIONS  (STANDING):  albuterol/ipratropium for Nebulization. 3 every 6 hours  aspirin enteric coated 81 daily  atorvastatin 80 at bedtime  clonazePAM  Tablet 0.5 at bedtime PRN  dextrose 40% Gel 15 once PRN  dextrose 50% Injectable 12.5 once  dextrose 50% Injectable 25 once  dextrose 50% Injectable 25 once  glucagon  Injectable 1 once PRN  heparin   Injectable 7500 every 8 hours  influenza   Vaccine 0.5 once  insulin glargine Injectable (LANTUS) 72 every morning  insulin glargine Injectable (LANTUS) 72 at bedtime  insulin lispro (ADMELOG) corrective regimen sliding scale  three times a day before meals  insulin lispro (ADMELOG) corrective regimen sliding scale  at bedtime  insulin lispro Injectable (ADMELOG) 15 three times a day before meals  oxyCODONE    IR 5 every 8 hours PRN  oxyCODONE  ER Tablet 80 every 12 hours      REVIEW OF SYSTEMS  [  ] ROS unobtainable because:    [ V ] All other systems negative except as noted below:	    Constitutional:  [ ] fever [ V] chills  [ ] weight loss  [ ] weakness  Skin:  [ ] rash [ ] phlebitis	  Eyes: [ ] icterus [ ] pain  [ ] discharge	  ENMT: [ ] sore throat  [ ] thrush [ ] ulcers [ ] exudates  Respiratory: [ ] dyspnea [ ] hemoptysis [ ] cough [ ] sputum	  Cardiovascular:  [ ] chest pain [ ] palpitations [ ] edema	  Gastrointestinal:  [ ] nausea [ ] vomiting [ ] diarrhea [ ] constipation [ ] pain	  Genitourinary:  [ ] dysuria [ ] frequency [ ] hematuria [ ] discharge [ ] flank pain  [ ] incontinence  Musculoskeletal:  [ ] myalgias [V ] arthralgias [ ] arthritis  [ ] back pain  Neurological:  [ ] headache [ ] seizures  [ ] confusion/altered mental status  Psychiatric:  [ ] anxiety [ ] depression	  Hematology/Lymphatics:  [ ] lymphadenopathy  Endocrine:  [ ] adrenal [ ] thyroid  Allergic/Immunologic:	 [ ] transplant [ ] seasonal    VITALS:  Vital Signs Last 24 Hrs  T(F): 98.1 (20 @ 14:20), Max: 98.5 (20 @ 15:58)    Vital Signs Last 24 Hrs  HR: 86 (20 @ 14:20) (76 - 86)  BP: 118/60 (20 @ 14:20) (93/61 - 142/73)  RR: 18 (20 @ 14:20)  SpO2: 96% (20 @ 14:20) (95% - 100%)  Wt(kg): --    EXAM:  GA: NAD, obese  HEENT: oral cavity no lesion  CV: nl S1/S2, no RMG  Lungs: CTAB  Abd: BS+, soft, nontender, no rebounding pain  Ext: bilateral shin venous stasis dermatitis. Right foot redness and there is an ulcer on the right foot buttom. Concerning for right foot cellulitis, can't r/o osteo  IV: no phlebitis    Labs:                        14.3   9.25  )-----------( 203      ( 2020 09:20 )             46.3         139  |  97  |  35<H>  ----------------------------<  190<H>  4.8   |  29  |  2.02<H>    Ca    9.9      2020 09:20  Phos  4.2     11-10  Mg     2.1     11-10    TPro  7.1  /  Alb  4.3  /  TBili  0.6  /  DBili  x   /  AST  16  /  ALT  16  /  AlkPhos  96        WBC Trend:  WBC Count: 9.25 (20 @ 09:20)  WBC Count: 9.59 (11-10-20 @ 12:36)  WBC Count: 10.17 (20 @ 17:45)      Auto Neutrophil #: 6.89 K/uL (20 @ 17:45)  Auto Neutrophil #: 7.76 K/uL (20 @ 23:29)  Auto Neutrophil #: 6.94 K/uL (20 @ 07:13)  Auto Neutrophil #: 10.58 K/uL (20 @ 12:46)  Band Neutrophils %: 0.9 % (20 @ 12:46)      Creatine Trend:  Creatinine, Serum: 2.02 ()  Creatinine, Serum: 2.07 (11-10)  Creatinine, Serum: 1.48 ()      Liver Biochemical Testing Trend:  Alanine Aminotransferase (ALT/SGPT): 16 ()  Alanine Aminotransferase (ALT/SGPT): 18 ()  Alanine Aminotransferase (ALT/SGPT): 24 ()  Aspartate Aminotransferase (AST/SGOT): 16 (20 @ 17:45)  Aspartate Aminotransferase (AST/SGOT): 14 (20 @ 05:26)  Aspartate Aminotransferase (AST/SGOT): 22 (20 @ 23:29)  Aspartate Aminotransferase (AST/SGOT): 14 (20 @ 06:55)  Aspartate Aminotransferase (AST/SGOT): 15 (20 @ 07:06)  Aspartate Aminotransferase (AST/SGOT): 17 (20 @ 07:13)  Aspartate Aminotransferase (AST/SGOT): 26 (20 @ 12:46)  Bilirubin Total, Serum: 0.6 (-)  Bilirubin Total, Serum: 0.4 (-)  Bilirubin Total, Serum: 0.4 ()  Bilirubin Total, Serum: 0.6 (-)  Bilirubin Total, Serum: 0.6 ()  Bilirubin Total, Serum: 0.7 ()  Bilirubin Total, Serum: 0.7 (-)      Trend LDH      Auto Eosinophil %: 1.8 % (20 @ 17:45)          MICROBIOLOGY:  Vancomycin Level, Trough: 20.8 (11-10 @ 22:44)    Culture - Abscess with Gram Stain (collected 10 Nov 2020 03:27)  Source: .Abscess right foot wound  Preliminary Report:    Culture yields >4 types of aerobic and/or anaerobic bacteria    Call client services within 7 days if further workup is clinically    indicated. Culture includes    Moderate Staphylococcus aureus    Culture - Blood (collected 10 Nov 2020 00:48)  Source: .Blood Blood-Peripheral  Preliminary Report:    No growth to date.    Culture - Blood (collected 10 Nov 2020 00:48)  Source: .Blood Blood-Peripheral  Preliminary Report:    No growth to date.    Culture - Urine (collected 2020 01:42)  Source: .Urine Clean Catch (Midstream)  Final Report:    <10,000 CFU/mL Normal Urogenital Brenda    Culture - Abscess with Gram Stain (collected 2020 00:24)  Source: .Abscess right foot wound  Final Report:    Rare Staphylococcus aureus    Few Finegoldia magna "Susceptibilities not performed"  Organism: Staphylococcus aureus  Organism: Staphylococcus aureus    Sensitivities:      -  Ampicillin/Sulbactam: S <=8/4      -  Cefazolin: S <=4      -  Clindamycin: S <=0.25      -  Erythromycin: S <=0.25      -  Gentamicin: S <=1 Should not be used as monotherapy      -  Oxacillin: S <=0.25      -  Penicillin: R 8      -  RIF- Rifampin: S <=1 Should not be used as monotherapy      -  Tetra/Doxy: S <=1      -  Trimethoprim/Sulfamethoxazole: S <=0.5/9.5      -  Vancomycin: S 2      Method Type: DARREL    Culture - Blood (collected 2020 15:02)  Source: .Blood Blood  Final Report:    No Growth Final    Culture - Blood (collected 2020 15:02)  Source: .Blood Blood  Final Report:    No Growth Final            OTHER TESTS:  COVID-19 PCR: NotDetec (20 @ 20:29)    COVID-19 IgG Antibody Index: <0.10 Index (11-10-20 @ 14:00)  COVID-19 IgG Antibody Index: 0.09 Index (20 @ 15:32)  COVID-19 IgG Antibody Index: <0.10 Index (20 @ 09:38)    Sedimentation Rate, Erythrocyte: 78 (11-10 @ 15:25)  Sedimentation Rate, Erythrocyte: 72 (11-10 @ 01:42)  C-Reactive Protein, Serum: 1.65 ( @ 17:45)    Blood Gas Venous - Lactate: 2.1 ( 17:45)    A1C with Estimated Average Glucose Result: 10.4 % (11-10-20 @ 12:53)  A1C with Estimated Average Glucose Result: 7.7 % (20 @ 09:22)      RADIOLOGY:  imaging below personally reviewed    < from: VA Duplex Lower Extrem Arterial, Bilat (11.10.20 @ 15:21) >  Impression: The quality of the examination was limited by limb girth which prevented imaging of the arteries of the right and left thighs.    The right and left ABIs are slightly decreased bilaterally.    The right and left popliteal arteries and trifurcation arteries are patent. No hemodynamically significant stenosis is identified.    < end of copied text >    < from: VA Duplex Lower Ext Vein Scan, Right (20 @ 19:08) >  IMPRESSION:    Markedly Limited study secondary to patient's body habitus as described above.  No evidence of right lower extremity deep venous thrombosis.    < end of copied text >    < from: Xray Foot AP + Lateral + Oblique, Right (20 @ 17:43) >    IMPRESSION:  1. No soft tissue gas or acute osseous destruction is appreciated.  2. Charcot changes of the midfoot are again seen.  3. MRI may be helpful for further evaluation of osteomyelitis as warranted.    < end of copied text >

## 2020-11-11 NOTE — CONSULT NOTE ADULT - ASSESSMENT
65F w/ pmh morbid obesity, asthma, CHF, pulmonary HTN, T2DM, CKD, HTN, diabetic neuropathy, Charcot foot, chronic venous statis and chronic right foot wound, chronic back pain with spinal stenosis, who presents with worsening right foot wound, R VINOD 0.94, L VINOD: 0.88    - Recommend local wound care  - Follow podiatry recommendations   -      ERINN Berry, PGY3  Vascular Surgery   [phone] with any questions   65F w/ pmh morbid obesity, asthma, CHF, pulmonary HTN, T2DM, CKD, HTN, diabetic neuropathy, Charcot foot, chronic venous statis and chronic right foot wound, chronic back pain with spinal stenosis, who presents with worsening right foot wound, R VINOD 0.94, L VINOD: 0.88    - No acute vascular intervention at this time - imaging reviewed  - Recommend local wound care  - Follow podiatry recommendations   - Recommend outpatient wound care and vascular surgery follow up for Unna boots and management of venous insufficiency  - Discussed with Dr. Simran Berry, PGY3  Vascular Surgery   [phone] with any questions   65F w/ pmh morbid obesity, asthma, CHF, pulmonary HTN, T2DM, CKD, HTN, diabetic neuropathy, Charcot foot, chronic venous statis and chronic right foot wound, chronic back pain with spinal stenosis, who presents with worsening right foot wound, R VINOD 0.94, L VINOD: 0.88    - No acute vascular intervention at this time - imaging reviewed  - Continue local wound care  - Follow podiatry recommendations   - Recommend outpatient wound care and vascular surgery follow up for Unna boots and management of venous insufficiency  - Please page [phone] with any acute concerns/questions  - Discussed with Dr. Simran Berry, PGY3  Vascular Surgery   [phone] with any questions

## 2020-11-12 LAB
-  AMPICILLIN/SULBACTAM: SIGNIFICANT CHANGE UP
-  CEFAZOLIN: SIGNIFICANT CHANGE UP
-  CLINDAMYCIN: SIGNIFICANT CHANGE UP
-  ERYTHROMYCIN: SIGNIFICANT CHANGE UP
-  GENTAMICIN: SIGNIFICANT CHANGE UP
-  OXACILLIN: SIGNIFICANT CHANGE UP
-  PENICILLIN: SIGNIFICANT CHANGE UP
-  RIFAMPIN: SIGNIFICANT CHANGE UP
-  TETRACYCLINE: SIGNIFICANT CHANGE UP
-  TRIMETHOPRIM/SULFAMETHOXAZOLE: SIGNIFICANT CHANGE UP
-  VANCOMYCIN: SIGNIFICANT CHANGE UP
ANION GAP SERPL CALC-SCNC: 13 MMOL/L — SIGNIFICANT CHANGE UP (ref 5–17)
BUN SERPL-MCNC: 35 MG/DL — HIGH (ref 7–23)
CALCIUM SERPL-MCNC: 9.4 MG/DL — SIGNIFICANT CHANGE UP (ref 8.4–10.5)
CHLORIDE SERPL-SCNC: 97 MMOL/L — SIGNIFICANT CHANGE UP (ref 96–108)
CO2 SERPL-SCNC: 26 MMOL/L — SIGNIFICANT CHANGE UP (ref 22–31)
CREAT SERPL-MCNC: 2.02 MG/DL — HIGH (ref 0.5–1.3)
CULTURE RESULTS: SIGNIFICANT CHANGE UP
GLUCOSE BLDC GLUCOMTR-MCNC: 199 MG/DL — HIGH (ref 70–99)
GLUCOSE BLDC GLUCOMTR-MCNC: 214 MG/DL — HIGH (ref 70–99)
GLUCOSE BLDC GLUCOMTR-MCNC: 269 MG/DL — HIGH (ref 70–99)
GLUCOSE BLDC GLUCOMTR-MCNC: 278 MG/DL — HIGH (ref 70–99)
GLUCOSE SERPL-MCNC: 291 MG/DL — HIGH (ref 70–99)
HCT VFR BLD CALC: 40.1 % — SIGNIFICANT CHANGE UP (ref 34.5–45)
HGB BLD-MCNC: 12.5 G/DL — SIGNIFICANT CHANGE UP (ref 11.5–15.5)
MCHC RBC-ENTMCNC: 29.5 PG — SIGNIFICANT CHANGE UP (ref 27–34)
MCHC RBC-ENTMCNC: 31.2 GM/DL — LOW (ref 32–36)
MCV RBC AUTO: 94.6 FL — SIGNIFICANT CHANGE UP (ref 80–100)
METHOD TYPE: SIGNIFICANT CHANGE UP
NRBC # BLD: 0 /100 WBCS — SIGNIFICANT CHANGE UP (ref 0–0)
ORGANISM # SPEC MICROSCOPIC CNT: SIGNIFICANT CHANGE UP
ORGANISM # SPEC MICROSCOPIC CNT: SIGNIFICANT CHANGE UP
PLATELET # BLD AUTO: 208 K/UL — SIGNIFICANT CHANGE UP (ref 150–400)
POTASSIUM SERPL-MCNC: 4.4 MMOL/L — SIGNIFICANT CHANGE UP (ref 3.5–5.3)
POTASSIUM SERPL-SCNC: 4.4 MMOL/L — SIGNIFICANT CHANGE UP (ref 3.5–5.3)
RBC # BLD: 4.24 M/UL — SIGNIFICANT CHANGE UP (ref 3.8–5.2)
RBC # FLD: 13.7 % — SIGNIFICANT CHANGE UP (ref 10.3–14.5)
SODIUM SERPL-SCNC: 136 MMOL/L — SIGNIFICANT CHANGE UP (ref 135–145)
SPECIMEN SOURCE: SIGNIFICANT CHANGE UP
WBC # BLD: 8.66 K/UL — SIGNIFICANT CHANGE UP (ref 3.8–10.5)
WBC # FLD AUTO: 8.66 K/UL — SIGNIFICANT CHANGE UP (ref 3.8–10.5)

## 2020-11-12 PROCEDURE — 99233 SBSQ HOSP IP/OBS HIGH 50: CPT

## 2020-11-12 PROCEDURE — 99232 SBSQ HOSP IP/OBS MODERATE 35: CPT

## 2020-11-12 RX ORDER — INSULIN GLARGINE 100 [IU]/ML
77 INJECTION, SOLUTION SUBCUTANEOUS AT BEDTIME
Refills: 0 | Status: DISCONTINUED | OUTPATIENT
Start: 2020-11-12 | End: 2020-11-13

## 2020-11-12 RX ORDER — INSULIN LISPRO 100/ML
19 VIAL (ML) SUBCUTANEOUS
Refills: 0 | Status: DISCONTINUED | OUTPATIENT
Start: 2020-11-12 | End: 2020-11-13

## 2020-11-12 RX ORDER — POLYETHYLENE GLYCOL 3350 17 G/17G
17 POWDER, FOR SOLUTION ORAL ONCE
Refills: 0 | Status: COMPLETED | OUTPATIENT
Start: 2020-11-12 | End: 2020-11-12

## 2020-11-12 RX ORDER — ASCORBIC ACID 60 MG
500 TABLET,CHEWABLE ORAL DAILY
Refills: 0 | Status: DISCONTINUED | OUTPATIENT
Start: 2020-11-12 | End: 2020-11-14

## 2020-11-12 RX ADMIN — Medication 15 UNIT(S): at 08:40

## 2020-11-12 RX ADMIN — HEPARIN SODIUM 7500 UNIT(S): 5000 INJECTION INTRAVENOUS; SUBCUTANEOUS at 14:09

## 2020-11-12 RX ADMIN — OXYCODONE HYDROCHLORIDE 80 MILLIGRAM(S): 5 TABLET ORAL at 17:23

## 2020-11-12 RX ADMIN — CHLORHEXIDINE GLUCONATE 1 APPLICATION(S): 213 SOLUTION TOPICAL at 12:43

## 2020-11-12 RX ADMIN — POLYETHYLENE GLYCOL 3350 17 GRAM(S): 17 POWDER, FOR SOLUTION ORAL at 05:46

## 2020-11-12 RX ADMIN — Medication 3 MILLILITER(S): at 23:09

## 2020-11-12 RX ADMIN — OXYCODONE HYDROCHLORIDE 5 MILLIGRAM(S): 5 TABLET ORAL at 13:47

## 2020-11-12 RX ADMIN — ATORVASTATIN CALCIUM 80 MILLIGRAM(S): 80 TABLET, FILM COATED ORAL at 21:07

## 2020-11-12 RX ADMIN — OXYCODONE HYDROCHLORIDE 80 MILLIGRAM(S): 5 TABLET ORAL at 06:16

## 2020-11-12 RX ADMIN — NYSTATIN CREAM 1 APPLICATION(S): 100000 CREAM TOPICAL at 17:25

## 2020-11-12 RX ADMIN — Medication 3 MILLILITER(S): at 00:30

## 2020-11-12 RX ADMIN — Medication 81 MILLIGRAM(S): at 12:44

## 2020-11-12 RX ADMIN — OXYCODONE HYDROCHLORIDE 5 MILLIGRAM(S): 5 TABLET ORAL at 14:47

## 2020-11-12 RX ADMIN — Medication 3: at 12:43

## 2020-11-12 RX ADMIN — INSULIN GLARGINE 72 UNIT(S): 100 INJECTION, SOLUTION SUBCUTANEOUS at 08:38

## 2020-11-12 RX ADMIN — PIPERACILLIN AND TAZOBACTAM 25 GRAM(S): 4; .5 INJECTION, POWDER, LYOPHILIZED, FOR SOLUTION INTRAVENOUS at 21:07

## 2020-11-12 RX ADMIN — Medication 15 UNIT(S): at 12:43

## 2020-11-12 RX ADMIN — Medication 3 MILLILITER(S): at 12:43

## 2020-11-12 RX ADMIN — Medication 2: at 17:23

## 2020-11-12 RX ADMIN — Medication 3 MILLILITER(S): at 05:46

## 2020-11-12 RX ADMIN — NYSTATIN CREAM 1 APPLICATION(S): 100000 CREAM TOPICAL at 06:10

## 2020-11-12 RX ADMIN — PIPERACILLIN AND TAZOBACTAM 25 GRAM(S): 4; .5 INJECTION, POWDER, LYOPHILIZED, FOR SOLUTION INTRAVENOUS at 14:07

## 2020-11-12 RX ADMIN — Medication 1 APPLICATION(S): at 17:26

## 2020-11-12 RX ADMIN — HEPARIN SODIUM 7500 UNIT(S): 5000 INJECTION INTRAVENOUS; SUBCUTANEOUS at 05:47

## 2020-11-12 RX ADMIN — Medication 3: at 08:40

## 2020-11-12 RX ADMIN — OXYCODONE HYDROCHLORIDE 80 MILLIGRAM(S): 5 TABLET ORAL at 05:46

## 2020-11-12 RX ADMIN — INSULIN GLARGINE 77 UNIT(S): 100 INJECTION, SOLUTION SUBCUTANEOUS at 21:46

## 2020-11-12 RX ADMIN — Medication 3 MILLILITER(S): at 17:23

## 2020-11-12 RX ADMIN — PIPERACILLIN AND TAZOBACTAM 25 GRAM(S): 4; .5 INJECTION, POWDER, LYOPHILIZED, FOR SOLUTION INTRAVENOUS at 05:47

## 2020-11-12 RX ADMIN — HEPARIN SODIUM 7500 UNIT(S): 5000 INJECTION INTRAVENOUS; SUBCUTANEOUS at 21:11

## 2020-11-12 RX ADMIN — Medication 19 UNIT(S): at 17:24

## 2020-11-12 NOTE — PHYSICAL THERAPY INITIAL EVALUATION ADULT - PATIENT/FAMILY AGREES WITH PLAN
Home with home PT for safety assessment, gait, balance, & strength training and to return pt to baseline functional mobility status. However pt. declines any follow ups by PT due to pandemic./no

## 2020-11-12 NOTE — PROGRESS NOTE ADULT - SUBJECTIVE AND OBJECTIVE BOX
Follow Up:  diabetic foot infection and cellulitis    Interval History: still with some erythema on R foot and also some erythema on L foot but no tenderness, just some pain on the L lateral leg, pt could not get the MRI due to size    ROS:      All other systems negative    Constitutional: no fever, no chills  Cardiovascular:  no chest pain, no palpitation  Respiratory:  no SOB, no cough  GI:  no abd pain, no vomiting, no diarrhea  urinary: no dysuria, no hematuria, no flank pain  musculoskeletal:  R foot redness and pain, Lfoot new redness and some pain at the L lateral leg  skin:  no rash, R foot wond  neurology:  no headache, no seizure    Allergies  adhesives (Rash)  latex (Rash)  No Known Drug Allergies  wool- rash, itch (Other)        ANTIMICROBIALS:  piperacillin/tazobactam IVPB.. 3.375 every 8 hours      OTHER MEDS:  albuterol/ipratropium for Nebulization. 3 milliLiter(s) Nebulizer every 6 hours  aspirin enteric coated 81 milliGRAM(s) Oral daily  atorvastatin 80 milliGRAM(s) Oral at bedtime  chlorhexidine 2% Cloths 1 Application(s) Topical daily  clonazePAM  Tablet 0.5 milliGRAM(s) Oral at bedtime PRN  collagenase Ointment 1 Application(s) Topical daily  dextrose 40% Gel 15 Gram(s) Oral once PRN  dextrose 5%. 1000 milliLiter(s) IV Continuous <Continuous>  dextrose 50% Injectable 12.5 Gram(s) IV Push once  dextrose 50% Injectable 25 Gram(s) IV Push once  dextrose 50% Injectable 25 Gram(s) IV Push once  glucagon  Injectable 1 milliGRAM(s) IntraMuscular once PRN  heparin   Injectable 7500 Unit(s) SubCutaneous every 8 hours  influenza   Vaccine 0.5 milliLiter(s) IntraMuscular once  insulin glargine Injectable (LANTUS) 72 Unit(s) SubCutaneous every morning  insulin glargine Injectable (LANTUS) 77 Unit(s) SubCutaneous at bedtime  insulin lispro (ADMELOG) corrective regimen sliding scale   SubCutaneous three times a day before meals  insulin lispro (ADMELOG) corrective regimen sliding scale   SubCutaneous at bedtime  insulin lispro Injectable (ADMELOG) 19 Unit(s) SubCutaneous three times a day before meals  nystatin Powder 1 Application(s) Topical two times a day  oxyCODONE    IR 5 milliGRAM(s) Oral every 8 hours PRN  oxyCODONE  ER Tablet 80 milliGRAM(s) Oral every 12 hours      Vital Signs Last 24 Hrs  T(C): 36.5 (12 Nov 2020 15:27), Max: 37.3 (12 Nov 2020 08:01)  T(F): 97.7 (12 Nov 2020 15:27), Max: 99.1 (12 Nov 2020 08:01)  HR: 85 (12 Nov 2020 15:27) (78 - 88)  BP: 138/75 (12 Nov 2020 15:27) (111/79 - 138/75)  BP(mean): --  RR: 20 (12 Nov 2020 15:27) (18 - 20)  SpO2: 95% (12 Nov 2020 15:27) (95% - 96%)    Physical Exam:  General:    NAD,  non toxic, sitting on a chair  Cardio:     regular S1, S2,  no murmur  Respiratory:    clear b/l,    no wheezing  abd:     soft,   BS +,   no tenderness  :   no CVAT,  no suprapubic tenderness,   no  de la torre  Musculoskeletal: R plantar foot wound with erythema on the dorsal and plantar surface, R foot ?erythema but no tenderness, L lateral LE slightly erythematous on the chronic lesion  vascular: no phlebitis  Skin:    no rash                          12.5   8.66  )-----------( 208      ( 12 Nov 2020 07:39 )             40.1       11-12    136  |  97  |  35<H>  ----------------------------<  291<H>  4.4   |  26  |  2.02<H>    Ca    9.4      12 Nov 2020 07:39            MICROBIOLOGY:  Vancomycin Level, Trough: 19.6 ug/mL (11-11-20 @ 17:13)  v  .Abscess right foot wound  11-10-20   Culture yields >4 types of aerobic and/or anaerobic bacteria  Call client services within 7 days if further workup is clinically  indicated. Culture includes  Moderate Staphylococcus aureus  --  Staphylococcus aureus      .Blood Blood-Peripheral  11-10-20   No growth to date.  --  --                RADIOLOGY:  Images independently visualized and reviewed personally, findings as below  < from: VA Duplex Lower Extrem Arterial, Bilat (11.10.20 @ 15:21) >  Impression: The quality of the examination was limited by limb girth which prevented imaging of the arteries of the right and left thighs.    The right and left ABIs are slightly decreased bilaterally.    The right and left popliteal arteries and trifurcation arteries are patent. No hemodynamically significant stenosis is identified.          < end of copied text >  < from: Xray Foot AP + Lateral + Oblique, Right (11.09.20 @ 17:43) >  IMPRESSION:  1. No soft tissue gas or acute osseous destruction is appreciated.  2. Charcot changes of the midfoot are again seen.  3. MRI may be helpful for further evaluation of osteomyelitis as warranted.      < end of copied text >

## 2020-11-12 NOTE — PROGRESS NOTE ADULT - SUBJECTIVE AND OBJECTIVE BOX
Podiatry pager #: [phone] ([place])/ [phone] ([facility])    Patient is a 65y old  Female who presents with a chief complaint of right foot cellulitis (11 Nov 2020 17:10)       INTERVAL HPI/OVERNIGHT EVENTS:  Patient seen and evaluated at bedside.  Pt is resting comfortable in NAD. Denies N/V/F/C.     Allergies    adhesives (Rash)  latex (Rash)  No Known Drug Allergies  wool- rash, itch (Other)    Intolerances        Vital Signs Last 24 Hrs  T(C): 37.3 (12 Nov 2020 08:01), Max: 37.3 (12 Nov 2020 08:01)  T(F): 99.1 (12 Nov 2020 08:01), Max: 99.1 (12 Nov 2020 08:01)  HR: 82 (12 Nov 2020 08:01) (82 - 88)  BP: 128/68 (12 Nov 2020 08:01) (118/60 - 131/62)  BP(mean): --  RR: 18 (12 Nov 2020 08:01) (18 - 18)  SpO2: 95% (12 Nov 2020 08:01) (95% - 96%)    LABS:                        12.5   8.66  )-----------( 208      ( 12 Nov 2020 07:39 )             40.1     11-12    136  |  97  |  35<H>  ----------------------------<  291<H>  4.4   |  26  |  2.02<H>    Ca    9.4      12 Nov 2020 07:39          CAPILLARY BLOOD GLUCOSE      POCT Blood Glucose.: 278 mg/dL (12 Nov 2020 08:18)  POCT Blood Glucose.: 259 mg/dL (11 Nov 2020 22:49)  POCT Blood Glucose.: 252 mg/dL (11 Nov 2020 17:06)  POCT Blood Glucose.: 217 mg/dL (11 Nov 2020 12:10)      Lower Extremity Physical Exam:  Vascular: DP/PT 1/4 B/L, CFT <3 seconds B/L, Temperature gradient warm to cool B/L  Neuro: Epicritic sensation diminished to the level of midfoot B/L  Musculoskeletal/Ortho: charcot foot deformity R, non-ambi  Skin: Right plantar midfoot wound to subq, no purulence, no fluctuance, mild malodor, hyperkeratotic borders, unimproved erythema of R dorsal and plantar forefoot ; now with L foot showing signs of erythema noted to forefoot, L forefoot warm to touch, no open wounds to L foot    RADIOLOGY & ADDITIONAL TESTS:

## 2020-11-12 NOTE — PROGRESS NOTE ADULT - PROBLEM SELECTOR PLAN 1
History of chronic wound and charcot foot with concern for overlying infection due to new erythema and reported drainage  - c/w zosyn  - Podiatry consult - debrided at bedside and cultures obtained. Follow up wound cultures  - DVT US - unable to visualize R common femoral, but otherwise negative  - repeat arterial studies ordered which show mild decrease in flow, appreciate vascular recs   -MRI foot not done as machine unable to accommodate patient, will f/u with podiatry and ID if CT is warranted  -c/w current management  -will try compression stockings

## 2020-11-12 NOTE — DIETITIAN INITIAL EVALUATION ADULT. - PERTINENT LABORATORY DATA
11-12 Na136 mmol/L Glu 291 mg/dL<H> K+ 4.4 mmol/L Cr  2.02 mg/dL<H> BUN 35 mg/dL<H> Phos n/a   Alb n/a   PAB n/a

## 2020-11-12 NOTE — PROGRESS NOTE ADULT - ASSESSMENT
65 f with  morbid obesity, DM, HTN, asthma, CHF, pulmonary HTN, CKD, diabetic neuropathy, Charcot foot, chronic venous statis and chronic right foot wound, now with R foot erythema   afebrile, WBC normal  s/p debridement by podiatry and cx polymicrobial with anaerobes and also staph aureus, pt had MSSA and finegolida 7/2020  was on vanco and zosyn here with elevated vanco level and increased Cr    diabetic foot infection and cellulitis, r/o osteo  JACQUES on CKD    * wound cx with MSSA and polymicrobial with >4 organisms  * if pt does not fit in the MRI the would do a R foot CT with contrast  * no more vanco  * c/w zosyn for now  * f/u with podiatry     The above assessment and plan was discussed with the primary team    Steffany Lockhart MD  Pager [phone]  After 5pm and on weekends call [phone]

## 2020-11-12 NOTE — DIETITIAN INITIAL EVALUATION ADULT. - PERSON TAUGHT/METHOD
Discussed carbohydrate sources, fiber sources and pairing protein with carbohydrate. Also discussed HF recommendations. Provided 1800kcal meal plan and weight loss tips./verbal instruction/patient instructed/written material

## 2020-11-12 NOTE — PHYSICAL THERAPY INITIAL EVALUATION ADULT - PERTINENT HX OF CURRENT PROBLEM, REHAB EVAL
65 year old woman with PMH morbid obesity, asthma, CHF, pulmonary HTN, home on 3L NC, T2DM, CKD, HTN, diabetic neuropathy, Charcot foot, chronic venous statis and chronic right foot wound, chronic back pain with spinal stenosis, who presents with worsening right foot wound. Patient reports having chronic foot wound since July that she is being managed by home wound care nurse. 65 year old woman with PMH morbid obesity, asthma, CHF, pulmonary HTN, home on 3L NC, T2DM, CKD, HTN, diabetic neuropathy, Charcot foot, chronic venous statis and chronic right foot wound, chronic back pain with spinal stenosis, who presents with worsening right foot wound. Patient reports having chronic foot wound since July that she is being managed by home wound care nurse. x ray foot- no acute osseous destruction.

## 2020-11-12 NOTE — PROGRESS NOTE ADULT - SUBJECTIVE AND OBJECTIVE BOX
Patient is a 65y old  Female who presents with a chief complaint of right foot cellulitis (11 Nov 2020 17:10)      SUBJECTIVE / OVERNIGHT EVENTS: patient was unable to get MRi done yesterday. No acute events overnight.     MEDICATIONS  (STANDING):  albuterol/ipratropium for Nebulization. 3 milliLiter(s) Nebulizer every 6 hours  aspirin enteric coated 81 milliGRAM(s) Oral daily  atorvastatin 80 milliGRAM(s) Oral at bedtime  chlorhexidine 2% Cloths 1 Application(s) Topical daily  collagenase Ointment 1 Application(s) Topical daily  dextrose 5%. 1000 milliLiter(s) (50 mL/Hr) IV Continuous <Continuous>  dextrose 50% Injectable 12.5 Gram(s) IV Push once  dextrose 50% Injectable 25 Gram(s) IV Push once  dextrose 50% Injectable 25 Gram(s) IV Push once  heparin   Injectable 7500 Unit(s) SubCutaneous every 8 hours  influenza   Vaccine 0.5 milliLiter(s) IntraMuscular once  insulin glargine Injectable (LANTUS) 72 Unit(s) SubCutaneous every morning  insulin glargine Injectable (LANTUS) 72 Unit(s) SubCutaneous at bedtime  insulin lispro (ADMELOG) corrective regimen sliding scale   SubCutaneous three times a day before meals  insulin lispro (ADMELOG) corrective regimen sliding scale   SubCutaneous at bedtime  insulin lispro Injectable (ADMELOG) 15 Unit(s) SubCutaneous three times a day before meals  nystatin Powder 1 Application(s) Topical two times a day  oxyCODONE  ER Tablet 80 milliGRAM(s) Oral every 12 hours  piperacillin/tazobactam IVPB.. 3.375 Gram(s) IV Intermittent every 8 hours    MEDICATIONS  (PRN):  clonazePAM  Tablet 0.5 milliGRAM(s) Oral at bedtime PRN ancxiety  dextrose 40% Gel 15 Gram(s) Oral once PRN Blood Glucose LESS THAN 70 milliGRAM(s)/deciliter  glucagon  Injectable 1 milliGRAM(s) IntraMuscular once PRN Glucose LESS THAN 70 milligrams/deciliter  oxyCODONE    IR 5 milliGRAM(s) Oral every 8 hours PRN Breakthrough Pain      Vital Signs Last 24 Hrs  T(C): 37.3 (12 Nov 2020 08:01), Max: 37.3 (12 Nov 2020 08:01)  T(F): 99.1 (12 Nov 2020 08:01), Max: 99.1 (12 Nov 2020 08:01)  HR: 78 (12 Nov 2020 12:31) (78 - 88)  BP: 111/79 (12 Nov 2020 12:31) (111/79 - 131/62)  BP(mean): --  RR: 18 (12 Nov 2020 12:31) (18 - 18)  SpO2: 96% (12 Nov 2020 12:31) (95% - 96%)  CAPILLARY BLOOD GLUCOSE      POCT Blood Glucose.: 269 mg/dL (12 Nov 2020 12:17)  POCT Blood Glucose.: 278 mg/dL (12 Nov 2020 08:18)  POCT Blood Glucose.: 259 mg/dL (11 Nov 2020 22:49)  POCT Blood Glucose.: 252 mg/dL (11 Nov 2020 17:06)    I&O's Summary    11 Nov 2020 07:01  -  12 Nov 2020 07:00  --------------------------------------------------------  IN: 1000 mL / OUT: 1900 mL / NET: -900 mL    PHYSICAL EXAM:  GENERAL: NAD  EYES: conjunctiva and sclera clear  CHEST/LUNG: Clear to auscultation bilaterally; No wheeze  HEART: +S1/S2, reg   ABDOMEN: Soft, Nontender, Nondistended  EXTREMITIES:  RLE with dressing in place, trace b/l LE edema with skin changes   PSYCH: AAOx3      LABS:                        12.5   8.66  )-----------( 208      ( 12 Nov 2020 07:39 )             40.1     11-12    136  |  97  |  35<H>  ----------------------------<  291<H>  4.4   |  26  |  2.02<H>    Ca    9.4      12 Nov 2020 07:39        Consultant(s) Notes Reviewed:  Vascular, ID, Podiatry

## 2020-11-12 NOTE — PROGRESS NOTE ADULT - ASSESSMENT
66 yo F pt w/ R foot wound  - pt seen and evaluated  - Right plantar midfoot wound to subq, no purulence, no fluctuance, mild malodor, hyperkeratotic borders, unimproved erythema of R dorsal and plantar forefoot ; now with L foot showing signs of erythema noted to forefoot, L forefoot warm to touch, no open wounds to L foot  - pt endorsing new pain to lateral LLE  - pt unable to get MR due to size  - will continue to monitor cellulitis  - cont. IV abx per ID   - discussed w/ attending

## 2020-11-12 NOTE — PHYSICAL THERAPY INITIAL EVALUATION ADULT - ADDITIONAL COMMENTS
Patient lives in pvt house with 2 sons, ramp+.  Patient ambulated with rolling walker independent. uses motorized w/c asw ell. pt. owns shower chair, bed side commode @ home.

## 2020-11-12 NOTE — PHYSICAL THERAPY INITIAL EVALUATION ADULT - DISCHARGE DISPOSITION, PT EVAL
Home with home PT for safety assessment, gait, balance, & strength training and to return pt to baseline functional mobility status. However pt. declines any follow ups by PT due to pandemic./home w/ home PT

## 2020-11-12 NOTE — DIETITIAN INITIAL EVALUATION ADULT. - OTHER INFO
This admission: pt reports good PO intake, requests evening snack with evening dose of insulin. Pt is on a renal diet currently although has no indications for renal diet. Recommend Consistent Carbohydrate with evening snack and DASH diet + Edward BID. Explained purpose of Edward supplement.     She confirms NKFA, no nausea/vomiting, no difficulty chewing/swallowing, no GI distress, no micronutrient supplementation PTA.    Weight Hx: Pt endorses 11 pound wt loss within 2 months 2/2 watching portion sizes and not eating takeout foods. Pt reports weight is 382 pounds. Per chart, dosing wt was 390 pounds (11/9), and current wt is 385 pounds (11/11).

## 2020-11-12 NOTE — DIETITIAN INITIAL EVALUATION ADULT. - ORAL INTAKE PTA/DIET HISTORY
Pt reports good PO intake PTA, states she is carb conscious and follows a low sodium diet. Most of meals are obtained via senior meal delivery service, sometimes her sons cook. She reports trouble with adapting to new insulin regimen since July, blood sugars could not be controlled with new insulin. She follows very closely with her PCP who has been regulating this change. Since July, blood sugars have been difficult to manage despite diet control and medication compliance. A1c is 10.4%.

## 2020-11-13 DIAGNOSIS — I10 ESSENTIAL (PRIMARY) HYPERTENSION: ICD-10-CM

## 2020-11-13 DIAGNOSIS — E78.5 HYPERLIPIDEMIA, UNSPECIFIED: ICD-10-CM

## 2020-11-13 DIAGNOSIS — N18.9 CHRONIC KIDNEY DISEASE, UNSPECIFIED: ICD-10-CM

## 2020-11-13 DIAGNOSIS — E11.65 TYPE 2 DIABETES MELLITUS WITH HYPERGLYCEMIA: ICD-10-CM

## 2020-11-13 LAB
ANION GAP SERPL CALC-SCNC: 14 MMOL/L — SIGNIFICANT CHANGE UP (ref 5–17)
BUN SERPL-MCNC: 31 MG/DL — HIGH (ref 7–23)
CALCIUM SERPL-MCNC: 9.8 MG/DL — SIGNIFICANT CHANGE UP (ref 8.4–10.5)
CHLORIDE SERPL-SCNC: 99 MMOL/L — SIGNIFICANT CHANGE UP (ref 96–108)
CO2 SERPL-SCNC: 25 MMOL/L — SIGNIFICANT CHANGE UP (ref 22–31)
CREAT SERPL-MCNC: 1.78 MG/DL — HIGH (ref 0.5–1.3)
GLUCOSE BLDC GLUCOMTR-MCNC: 173 MG/DL — HIGH (ref 70–99)
GLUCOSE BLDC GLUCOMTR-MCNC: 187 MG/DL — HIGH (ref 70–99)
GLUCOSE BLDC GLUCOMTR-MCNC: 225 MG/DL — HIGH (ref 70–99)
GLUCOSE BLDC GLUCOMTR-MCNC: 229 MG/DL — HIGH (ref 70–99)
GLUCOSE SERPL-MCNC: 226 MG/DL — HIGH (ref 70–99)
HCT VFR BLD CALC: 43.4 % — SIGNIFICANT CHANGE UP (ref 34.5–45)
HGB BLD-MCNC: 13.8 G/DL — SIGNIFICANT CHANGE UP (ref 11.5–15.5)
MCHC RBC-ENTMCNC: 29.8 PG — SIGNIFICANT CHANGE UP (ref 27–34)
MCHC RBC-ENTMCNC: 31.8 GM/DL — LOW (ref 32–36)
MCV RBC AUTO: 93.7 FL — SIGNIFICANT CHANGE UP (ref 80–100)
NRBC # BLD: 0 /100 WBCS — SIGNIFICANT CHANGE UP (ref 0–0)
PLATELET # BLD AUTO: 218 K/UL — SIGNIFICANT CHANGE UP (ref 150–400)
POTASSIUM SERPL-MCNC: 4.9 MMOL/L — SIGNIFICANT CHANGE UP (ref 3.5–5.3)
POTASSIUM SERPL-SCNC: 4.9 MMOL/L — SIGNIFICANT CHANGE UP (ref 3.5–5.3)
RBC # BLD: 4.63 M/UL — SIGNIFICANT CHANGE UP (ref 3.8–5.2)
RBC # FLD: 13.5 % — SIGNIFICANT CHANGE UP (ref 10.3–14.5)
SODIUM SERPL-SCNC: 138 MMOL/L — SIGNIFICANT CHANGE UP (ref 135–145)
WBC # BLD: 8.01 K/UL — SIGNIFICANT CHANGE UP (ref 3.8–10.5)
WBC # FLD AUTO: 8.01 K/UL — SIGNIFICANT CHANGE UP (ref 3.8–10.5)

## 2020-11-13 PROCEDURE — 99223 1ST HOSP IP/OBS HIGH 75: CPT

## 2020-11-13 PROCEDURE — 99232 SBSQ HOSP IP/OBS MODERATE 35: CPT

## 2020-11-13 PROCEDURE — 99222 1ST HOSP IP/OBS MODERATE 55: CPT | Mod: GC

## 2020-11-13 PROCEDURE — 71045 X-RAY EXAM CHEST 1 VIEW: CPT | Mod: 26

## 2020-11-13 PROCEDURE — 99233 SBSQ HOSP IP/OBS HIGH 50: CPT

## 2020-11-13 RX ORDER — AMPICILLIN SODIUM AND SULBACTAM SODIUM 250; 125 MG/ML; MG/ML
3 INJECTION, POWDER, FOR SUSPENSION INTRAMUSCULAR; INTRAVENOUS EVERY 8 HOURS
Refills: 0 | Status: DISCONTINUED | OUTPATIENT
Start: 2020-11-13 | End: 2020-11-23

## 2020-11-13 RX ORDER — PETROLATUM,WHITE
1 JELLY (GRAM) TOPICAL
Refills: 0 | Status: DISCONTINUED | OUTPATIENT
Start: 2020-11-13 | End: 2020-11-25

## 2020-11-13 RX ORDER — INSULIN LISPRO 100/ML
22 VIAL (ML) SUBCUTANEOUS
Refills: 0 | Status: DISCONTINUED | OUTPATIENT
Start: 2020-11-13 | End: 2020-11-21

## 2020-11-13 RX ORDER — POLYETHYLENE GLYCOL 3350 17 G/17G
17 POWDER, FOR SOLUTION ORAL DAILY
Refills: 0 | Status: COMPLETED | OUTPATIENT
Start: 2020-11-13 | End: 2020-11-15

## 2020-11-13 RX ORDER — SENNA PLUS 8.6 MG/1
2 TABLET ORAL AT BEDTIME
Refills: 0 | Status: DISCONTINUED | OUTPATIENT
Start: 2020-11-13 | End: 2020-11-25

## 2020-11-13 RX ORDER — INSULIN GLARGINE 100 [IU]/ML
80 INJECTION, SOLUTION SUBCUTANEOUS AT BEDTIME
Refills: 0 | Status: DISCONTINUED | OUTPATIENT
Start: 2020-11-13 | End: 2020-11-21

## 2020-11-13 RX ORDER — INSULIN GLARGINE 100 [IU]/ML
80 INJECTION, SOLUTION SUBCUTANEOUS EVERY MORNING
Refills: 0 | Status: DISCONTINUED | OUTPATIENT
Start: 2020-11-13 | End: 2020-11-21

## 2020-11-13 RX ADMIN — Medication 19 UNIT(S): at 08:04

## 2020-11-13 RX ADMIN — OXYCODONE HYDROCHLORIDE 80 MILLIGRAM(S): 5 TABLET ORAL at 17:34

## 2020-11-13 RX ADMIN — NYSTATIN CREAM 1 APPLICATION(S): 100000 CREAM TOPICAL at 05:18

## 2020-11-13 RX ADMIN — Medication 500 MILLIGRAM(S): at 12:47

## 2020-11-13 RX ADMIN — ATORVASTATIN CALCIUM 80 MILLIGRAM(S): 80 TABLET, FILM COATED ORAL at 21:29

## 2020-11-13 RX ADMIN — Medication 3 MILLILITER(S): at 17:35

## 2020-11-13 RX ADMIN — HEPARIN SODIUM 7500 UNIT(S): 5000 INJECTION INTRAVENOUS; SUBCUTANEOUS at 05:17

## 2020-11-13 RX ADMIN — Medication 0.5 MILLIGRAM(S): at 21:40

## 2020-11-13 RX ADMIN — POLYETHYLENE GLYCOL 3350 17 GRAM(S): 17 POWDER, FOR SOLUTION ORAL at 12:47

## 2020-11-13 RX ADMIN — Medication 19 UNIT(S): at 12:51

## 2020-11-13 RX ADMIN — HEPARIN SODIUM 7500 UNIT(S): 5000 INJECTION INTRAVENOUS; SUBCUTANEOUS at 13:34

## 2020-11-13 RX ADMIN — Medication 3 MILLILITER(S): at 05:18

## 2020-11-13 RX ADMIN — Medication 3 MILLILITER(S): at 23:41

## 2020-11-13 RX ADMIN — OXYCODONE HYDROCHLORIDE 5 MILLIGRAM(S): 5 TABLET ORAL at 13:15

## 2020-11-13 RX ADMIN — HEPARIN SODIUM 7500 UNIT(S): 5000 INJECTION INTRAVENOUS; SUBCUTANEOUS at 21:29

## 2020-11-13 RX ADMIN — Medication 81 MILLIGRAM(S): at 12:48

## 2020-11-13 RX ADMIN — OXYCODONE HYDROCHLORIDE 5 MILLIGRAM(S): 5 TABLET ORAL at 05:15

## 2020-11-13 RX ADMIN — PIPERACILLIN AND TAZOBACTAM 25 GRAM(S): 4; .5 INJECTION, POWDER, LYOPHILIZED, FOR SOLUTION INTRAVENOUS at 12:48

## 2020-11-13 RX ADMIN — Medication 3: at 08:03

## 2020-11-13 RX ADMIN — Medication 3 MILLILITER(S): at 12:53

## 2020-11-13 RX ADMIN — OXYCODONE HYDROCHLORIDE 5 MILLIGRAM(S): 5 TABLET ORAL at 04:27

## 2020-11-13 RX ADMIN — OXYCODONE HYDROCHLORIDE 5 MILLIGRAM(S): 5 TABLET ORAL at 12:45

## 2020-11-13 RX ADMIN — Medication 22 UNIT(S): at 17:28

## 2020-11-13 RX ADMIN — OXYCODONE HYDROCHLORIDE 80 MILLIGRAM(S): 5 TABLET ORAL at 06:04

## 2020-11-13 RX ADMIN — Medication 2: at 12:51

## 2020-11-13 RX ADMIN — Medication 10 MILLIGRAM(S): at 14:55

## 2020-11-13 RX ADMIN — SENNA PLUS 2 TABLET(S): 8.6 TABLET ORAL at 21:37

## 2020-11-13 RX ADMIN — AMPICILLIN SODIUM AND SULBACTAM SODIUM 200 GRAM(S): 250; 125 INJECTION, POWDER, FOR SUSPENSION INTRAMUSCULAR; INTRAVENOUS at 21:29

## 2020-11-13 RX ADMIN — PIPERACILLIN AND TAZOBACTAM 25 GRAM(S): 4; .5 INJECTION, POWDER, LYOPHILIZED, FOR SOLUTION INTRAVENOUS at 05:17

## 2020-11-13 RX ADMIN — CHLORHEXIDINE GLUCONATE 1 APPLICATION(S): 213 SOLUTION TOPICAL at 12:53

## 2020-11-13 RX ADMIN — Medication 1 APPLICATION(S): at 12:54

## 2020-11-13 RX ADMIN — INSULIN GLARGINE 72 UNIT(S): 100 INJECTION, SOLUTION SUBCUTANEOUS at 08:06

## 2020-11-13 RX ADMIN — OXYCODONE HYDROCHLORIDE 80 MILLIGRAM(S): 5 TABLET ORAL at 05:19

## 2020-11-13 RX ADMIN — Medication 1 TABLET(S): at 12:47

## 2020-11-13 RX ADMIN — INSULIN GLARGINE 80 UNIT(S): 100 INJECTION, SOLUTION SUBCUTANEOUS at 21:46

## 2020-11-13 RX ADMIN — Medication 1 APPLICATION(S): at 17:25

## 2020-11-13 NOTE — PROGRESS NOTE ADULT - ASSESSMENT
65 f with  morbid obesity, DM, HTN, asthma, CHF, pulmonary HTN, CKD, diabetic neuropathy, Charcot foot, chronic venous statis and chronic right foot wound, now with R foot erythema   afebrile, WBC normal  s/p debridement by podiatry and cx polymicrobial with anaerobes and also staph aureus, pt had MSSA and finegolida 7/2020  was on vanco and zosyn here with elevated vanco level and increased Cr    diabetic foot infection and cellulitis, r/o osteo  JACQUES on CKD    * wound cx with MSSA and polymicrobial with >4 organisms  * if pt does not fit in the MRI the would do a R foot CT with contrast  * switch zosyn to unasyn 3 q 8 as there was no pseudomonas in the cultures  * f/u with podiatry     The above assessment and plan was discussed with the primary team    Steffany Lockhart MD  Pager [phone]  After 5pm and on weekends call [phone]

## 2020-11-13 NOTE — PROGRESS NOTE ADULT - SUBJECTIVE AND OBJECTIVE BOX
Podiatry pager #: [phone] ([place])/ [phone] ([facility])    Patient is a 65y old  Female who presents with a chief complaint of right foot cellulitis (12 Nov 2020 13:20)      HPI:  Patient is a 65 year old woman with PMH morbid obesity, asthma, CHF, pulmonary HTN, home on 3L NC, T2DM, CKD, HTN, diabetic neuropathy, Charcot foot, chronic venous statis and chronic right foot wound, chronic back pain with spinal stenosis, who presents with worsening right foot wound. Patient reports having chronic foot wound since July that she is being managed by home wound care nurse. No recalled trauma that caused wound. She reports that it is being treated with aquacell and daily dressing changes with wound care nurse three times a week and her son (pre-med) on other days. She states that she has been on Augmentin the past two weeks after noticing some drainage, but had been improving since 11/9. On 11/9 during dressing change it was noted to have drainage and also erythema around the right foot which was new and was advised by PCP (Dr. Mccann) to come to ED for evaluation. She states that she does not bear weight on her foot. She denies fevers. She reports numbness on bilateral lower extremities up to the level of mid shin. Patient denies fevers.    She reports her diabetes has been difficult to manage with ongoing adjustments to her insulin regimen currently being made. She states that she usually is not in the 200s on her home BG checks but recently has been and that she is currently on Levemir 72 units twice a day and 15 units of Novolog prior to meals. She used to see a home visit podiatrist but stopped. She reports no recent ophthalmologic evaluation due to COVID.    Patient reports recent diagnosis of asthma this year. Also reports recent diagnosis of CHF and pulmonary HTN at [place] this July after undergoing echo, stress test, and CT. Patient states that she was supposed to undergo cardiac catheterization but wanted to switch care to [facility] with Dr. Yung. She denies cough or early satiety. Reports having to sleep on 4 pillows at night. (09 Nov 2020 23:30)      PAST MEDICAL & SURGICAL HISTORY:  CKD (chronic kidney disease)    Insomnia    Edema of lower extremity  on lasix    Vitamin D deficiency    Morbid Obesity    Cataract    Dyslipidemia    Deep Vein Thrombosis (DVT)  Left leg, 2004, treated and resolved    Spinal Stenosis, Lumbar    Cervical Stenosis of Spine    Endometrial Hyperplasia    HTN (Hypertension)    Diabetes Mellitus Type II    S/P cholecystectomy    S/P appendectomy    S/P total abdominal hysterectomy and bilateral salpingo-oophorectomy    H/O colonoscopy  1998    H/O laser iridotomy  left eye, 2016    Endometrial biopsy 12/02/09    Tonsillectomy as a child    Cervical Spinal Stenosis surgery x2 (01/2002, 7/2002)    D&amp;C 2008  hysteroscopy, endometrial hyperplasia, 2009    D&amp;C x2 1980&#x27;s    Cholecystectomy/appendectomy @ age 25    C Section 1994    Cataract extracted with lens implant 1998  Right        MEDICATIONS  (STANDING):  albuterol/ipratropium for Nebulization. 3 milliLiter(s) Nebulizer every 6 hours  AQUAPHOR (petrolatum Ointment) 1 Application(s) Topical two times a day  ascorbic acid 500 milliGRAM(s) Oral daily  aspirin enteric coated 81 milliGRAM(s) Oral daily  atorvastatin 80 milliGRAM(s) Oral at bedtime  bisacodyl Suppository 10 milliGRAM(s) Rectal once  chlorhexidine 2% Cloths 1 Application(s) Topical daily  collagenase Ointment 1 Application(s) Topical daily  dextrose 5%. 1000 milliLiter(s) (50 mL/Hr) IV Continuous <Continuous>  dextrose 50% Injectable 12.5 Gram(s) IV Push once  dextrose 50% Injectable 25 Gram(s) IV Push once  dextrose 50% Injectable 25 Gram(s) IV Push once  heparin   Injectable 7500 Unit(s) SubCutaneous every 8 hours  influenza   Vaccine 0.5 milliLiter(s) IntraMuscular once  insulin glargine Injectable (LANTUS) 72 Unit(s) SubCutaneous every morning  insulin glargine Injectable (LANTUS) 77 Unit(s) SubCutaneous at bedtime  insulin lispro (ADMELOG) corrective regimen sliding scale   SubCutaneous three times a day before meals  insulin lispro (ADMELOG) corrective regimen sliding scale   SubCutaneous at bedtime  insulin lispro Injectable (ADMELOG) 19 Unit(s) SubCutaneous three times a day before meals  multivitamin 1 Tablet(s) Oral daily  nystatin Powder 1 Application(s) Topical two times a day  oxyCODONE  ER Tablet 80 milliGRAM(s) Oral every 12 hours  piperacillin/tazobactam IVPB.. 3.375 Gram(s) IV Intermittent every 8 hours  polyethylene glycol 3350 17 Gram(s) Oral daily  senna 2 Tablet(s) Oral at bedtime    MEDICATIONS  (PRN):  clonazePAM  Tablet 0.5 milliGRAM(s) Oral at bedtime PRN ancxiety  dextrose 40% Gel 15 Gram(s) Oral once PRN Blood Glucose LESS THAN 70 milliGRAM(s)/deciliter  glucagon  Injectable 1 milliGRAM(s) IntraMuscular once PRN Glucose LESS THAN 70 milligrams/deciliter  oxyCODONE    IR 5 milliGRAM(s) Oral every 8 hours PRN Breakthrough Pain      Allergies    adhesives (Rash)  latex (Rash)  No Known Drug Allergies  wool- rash, itch (Other)    Intolerances        VITALS:    Vital Signs Last 24 Hrs  T(C): 36.7 (13 Nov 2020 09:20), Max: 36.8 (13 Nov 2020 00:32)  T(F): 98 (13 Nov 2020 09:20), Max: 98.3 (13 Nov 2020 00:32)  HR: 87 (13 Nov 2020 09:20) (79 - 87)  BP: 145/69 (13 Nov 2020 09:20) (128/73 - 159/67)  BP(mean): --  RR: 18 (13 Nov 2020 09:20) (18 - 20)  SpO2: 96% (13 Nov 2020 09:20) (95% - 96%)    LABS:                          13.8   8.01  )-----------( 218      ( 13 Nov 2020 09:22 )             43.4       11-13    138  |  99  |  31<H>  ----------------------------<  226<H>  4.9   |  25  |  1.78<H>    Ca    9.8      13 Nov 2020 09:22        CAPILLARY BLOOD GLUCOSE      POCT Blood Glucose.: 225 mg/dL (13 Nov 2020 12:29)  POCT Blood Glucose.: 229 mg/dL (13 Nov 2020 07:49)  POCT Blood Glucose.: 199 mg/dL (12 Nov 2020 21:31)  POCT Blood Glucose.: 214 mg/dL (12 Nov 2020 17:21)          LOWER EXTREMITY PHYSICAL EXAM:  Vascular: DP/PT 1/4 B/L, CFT <3 seconds B/L, Temperature gradient warm to cool B/L  Neuro: Epicritic sensation diminished to the level of midfoot B/L  Musculoskeletal/Ortho: charcot foot deformity R, non-ambi  Skin: Right plantar midfoot wound to subq, no purulence, no fluctuance, no malodor, hyperkeratotic borders, improved erythema of R dorsal and plantar forefoot ; now with L foot showing signs of erythema noted to forefoot, L forefoot warm to touch, no open wounds to L foot    RADIOLOGY & ADDITIONAL STUDIES:

## 2020-11-13 NOTE — CONSULT NOTE ADULT - PROBLEM SELECTOR PROBLEM 1
No apparent complications or complaints regarding anesthesia care at this time
Type 2 diabetes mellitus with hyperglycemia, with long-term current use of insulin
Chronic kidney disease, unspecified CKD stage

## 2020-11-13 NOTE — CONSULT NOTE ADULT - SUBJECTIVE AND OBJECTIVE BOX
HPI:  Vaughn consulted for DM management. 64 y/o F w/ hx of DM2 26 years. Now complicated by neuropathy with Charcot and nephropathy. Was 1st told she had GDM during a twin pregnancy and then developed DM2. Home regimen is Levemir 72 units BID and Novolog 15 units with meals. Was previously on Novolin 70/30 76u qam an 70u qhs as well as Humalog 5-5-5, but now working with a clinical pharmacologist at her PCP's office who has been managing her insulin adjustments. Checks FS's daily with values generally in the 200-300 range without hypoglycemia. Lowest glucose 190. She uses a walker to walk. She is overdue for optho. She has had cataracts and narrow angle glaucoma in the past, s/p lasert x. She has sx of neuropathy and PVD and has frequent podiatry home visits. Tries to monitor carbs. No diabetes in parents or siblings. +polyuria and polydipsia. No recent optho follow-up.   Also hx of morbid obesity, asthma, CHF, pulmonary HTN, home on 3L NC, CKD, HTN, diabetic neuropathy, Charcot foot, chronic venous statis and chronic right foot wound, chronic back pain with spinal stenosis, who presented with worsening right foot wound. Patient reports having chronic foot wound since July that she is being managed by home wound care nurse. No recalled trauma that caused wound. She reports that it is being treated with aquacell and daily dressing changes with wound care nurse three times a week and her son (pre-med) on other days. She states that she has been on Augmentin the past two weeks after noticing some drainage, but had been improving since 11/9. On 11/9 during dressing change it was noted to have drainage and also erythema around the right foot which was new and was advised by PCP (Dr. Mccann) to come to ED for evaluation. She states that she does not bear weight on her foot. She denies fevers. She reports numbness on bilateral lower extremities up to the level of mid shin. Patient denies fevers.      PAST MEDICAL & SURGICAL HISTORY:  CKD (chronic kidney disease)    Insomnia    Edema of lower extremity  on lasix    Vitamin D deficiency    Morbid Obesity    Cataract    Dyslipidemia    Deep Vein Thrombosis (DVT)  Left leg, 2004, treated and resolved    Spinal Stenosis, Lumbar    Cervical Stenosis of Spine    Endometrial Hyperplasia    HTN (Hypertension)    Diabetes Mellitus Type II    S/P cholecystectomy    S/P appendectomy    S/P total abdominal hysterectomy and bilateral salpingo-oophorectomy    H/O colonoscopy  1998    H/O laser iridotomy  left eye, 2016    Endometrial biopsy 12/02/09    Tonsillectomy as a child    Cervical Spinal Stenosis surgery x2 (01/2002, 7/2002)    D&amp;C 2008  hysteroscopy, endometrial hyperplasia, 2009    D&amp;C x2 1980&#x27;s    Cholecystectomy/appendectomy @ age 25    C Section 1994    Cataract extracted with lens implant 1998  Right        FAMILY HISTORY:  Family history of lung cancer (Grandparent)    Family history of bladder cancer    Family history of pancreatic cancer        Social History: No tobacco or alcohol use    Outpatient Medications:  Levemir 72 units BID  Novolog 15 units with meals  Albuterol  ASA 81  Lipitor 80mg qhs    MEDICATIONS  (STANDING):  albuterol/ipratropium for Nebulization. 3 milliLiter(s) Nebulizer every 6 hours  ampicillin/sulbactam  IVPB 3 Gram(s) IV Intermittent every 8 hours  AQUAPHOR (petrolatum Ointment) 1 Application(s) Topical two times a day  ascorbic acid 500 milliGRAM(s) Oral daily  aspirin enteric coated 81 milliGRAM(s) Oral daily  atorvastatin 80 milliGRAM(s) Oral at bedtime  chlorhexidine 2% Cloths 1 Application(s) Topical daily  collagenase Ointment 1 Application(s) Topical daily  dextrose 5%. 1000 milliLiter(s) (50 mL/Hr) IV Continuous <Continuous>  dextrose 50% Injectable 12.5 Gram(s) IV Push once  dextrose 50% Injectable 25 Gram(s) IV Push once  dextrose 50% Injectable 25 Gram(s) IV Push once  heparin   Injectable 7500 Unit(s) SubCutaneous every 8 hours  influenza   Vaccine 0.5 milliLiter(s) IntraMuscular once  insulin glargine Injectable (LANTUS) 80 Unit(s) SubCutaneous every morning  insulin glargine Injectable (LANTUS) 80 Unit(s) SubCutaneous at bedtime  insulin lispro (ADMELOG) corrective regimen sliding scale   SubCutaneous three times a day before meals  insulin lispro (ADMELOG) corrective regimen sliding scale   SubCutaneous at bedtime  insulin lispro Injectable (ADMELOG) 22 Unit(s) SubCutaneous three times a day before meals  multivitamin 1 Tablet(s) Oral daily  nystatin Powder 1 Application(s) Topical two times a day  oxyCODONE  ER Tablet 80 milliGRAM(s) Oral every 12 hours  polyethylene glycol 3350 17 Gram(s) Oral daily  senna 2 Tablet(s) Oral at bedtime    MEDICATIONS  (PRN):  clonazePAM  Tablet 0.5 milliGRAM(s) Oral at bedtime PRN ancxiety  dextrose 40% Gel 15 Gram(s) Oral once PRN Blood Glucose LESS THAN 70 milliGRAM(s)/deciliter  glucagon  Injectable 1 milliGRAM(s) IntraMuscular once PRN Glucose LESS THAN 70 milligrams/deciliter  oxyCODONE    IR 5 milliGRAM(s) Oral every 8 hours PRN Breakthrough Pain      Allergies    adhesives (Rash)  latex (Rash)  No Known Drug Allergies  wool- rash, itch (Other)    Intolerances      Review of Systems:  Constitutional: No fever, No change in weight  Eyes: +retinopathy and glaucoma  Neuro: No headache, +neuropathy  HEENT: No throat pain  Cardiovascular: No chest pain  Respiratory: + SOB  GI: No nausea or vomiting  : + polyuria  Skin: +foot wound   Psych: no depression  Endocrine: + polydipsia, No heat or cold intolerance, rest as noted in HPI  Hem/lymph: +LE swelling    All other review of systems negative      PHYSICAL EXAM:  VITALS: T(C): 36.8 (11-13-20 @ 14:32)  T(F): 98.3 (11-13-20 @ 14:32), Max: 98.3 (11-13-20 @ 00:32)  HR: 90 (11-13-20 @ 14:32) (79 - 90)  BP: 171/69 (11-13-20 @ 14:32) (128/73 - 171/69)  RR:  (17 - 18)  SpO2:  (95% - 96%)  Wt(kg): --  GENERAL: NAD at this time, morbidly obese  EYES: No proptosis, EOMI  HEENT:  Atraumatic, Normocephalic,   THYROID: unable to assess due to body habitus  RESPIRATORY: full excursion, non-labored  CARDIOVASCULAR: Regular rhythm; No murmurs; +b/l LE peripheral edema  GI: Soft, nontender, non distended, normal bowel sounds  SKIN: +dressing on right foot  MUSCULOSKELETAL: +b/l charcot, decreased ROM  NEURO: follows commands,   PSYCH: Alert and oriented x 3, normal affect, normal mood  CUSHING'S SIGNS: no striae      POCT Blood Glucose.: 225 mg/dL (11-13-20 @ 12:29)  POCT Blood Glucose.: 229 mg/dL (11-13-20 @ 07:49)  POCT Blood Glucose.: 199 mg/dL (11-12-20 @ 21:31)  POCT Blood Glucose.: 214 mg/dL (11-12-20 @ 17:21)  POCT Blood Glucose.: 269 mg/dL (11-12-20 @ 12:17)  POCT Blood Glucose.: 278 mg/dL (11-12-20 @ 08:18)  POCT Blood Glucose.: 259 mg/dL (11-11-20 @ 22:49)  POCT Blood Glucose.: 252 mg/dL (11-11-20 @ 17:06)  POCT Blood Glucose.: 217 mg/dL (11-11-20 @ 12:10)  POCT Blood Glucose.: 161 mg/dL (11-11-20 @ 08:09)  POCT Blood Glucose.: 268 mg/dL (11-10-20 @ 21:17)  POCT Blood Glucose.: 257 mg/dL (11-10-20 @ 17:18)                              13.8   8.01  )-----------( 218      ( 13 Nov 2020 09:22 )             43.4       11-13    138  |  99  |  31<H>  ----------------------------<  226<H>  4.9   |  25  |  1.78<H>    EGFR if : 34<L>  EGFR if non : 29<L>    Ca    9.8      11-13      Thyroid Function Tests:            Radiology:

## 2020-11-13 NOTE — CONSULT NOTE ADULT - ASSESSMENT
66 y/o F w/ uncontrolled Type 2 DM complicated by retinopathy, neuropathy, and nephropathy with hyperglycemia A1c 10.4%, HTN, HLD admitted with right foot wound (high risk patient with severely uncontrolled Type 2 DM w/ hyperglycemia and A1c of 10.4% on high doses of insulin at high risk for CAD and CVA with high level decision-making).

## 2020-11-13 NOTE — PROGRESS NOTE ADULT - ASSESSMENT
64 yo F pt w/ R foot wound  - pt seen and evaluated  -Afebrile, no leukocytosis  - Right plantar midfoot wound to subq, no purulence, no fluctuance, no malodor, hyperkeratotic borders, improved erythema of R dorsal and plantar forefoot; L foot showing improved erythema noted to forefoot, no open wounds to L foot  - pt unable to get MR due to size  - pod stable for discharge, f/u information in discharge provider note under follow up   - D/c on PO abx per ID recommendations, appreciated  - Seen w/ attending

## 2020-11-13 NOTE — CONSULT NOTE ADULT - ATTENDING COMMENTS
65 f with  morbid obesity, DM, HTN, asthma, CHF, pulmonary HTN, CKD, diabetic neuropathy, Charcot foot, chronic venous statis and chronic right foot wound, now with R foot erythema   afebrile, WBC normal  s/p debridement by podiatry and cx polymicrobial with anaerobes and also staph aureus, pt had MSSA and finegolida 7/2020  was on vanco and zosyn here with elevated vanco level and increased Cr    diabetic foot infection and cellulitis, r/o osteo  JACQUES on CKD    * f/u the wound cx sensitivities  * f/u the MRI  * no more vanco  * c/w zosyn for now  * f/u with podiatry     The above assessment and plan was discussed with the primary team    Steffany Lockhart MD  Pager [phone]  After 5pm and on weekends call [phone]
Vivi Ricks MD  O:   C: [phone]

## 2020-11-13 NOTE — PROGRESS NOTE ADULT - PROBLEM SELECTOR PLAN 1
History of chronic wound and charcot foot with concern for overlying infection due to new erythema and reported drainage  - c/w zosyn  - Podiatry consult - debrided at bedside and cultures obtained. Follow up wound cultures  - DVT US - unable to visualize R common femoral, but otherwise negative  - repeat arterial studies ordered which show mild decrease in flow, appreciate vascular recs   -MRI foot not done as machine unable to accommodate patient, will get CT of the foot with contrast, once cleared by renal (consult placed)  -c/w current management  -will try compression stockings

## 2020-11-13 NOTE — CONSULT NOTE ADULT - PROBLEM SELECTOR RECOMMENDATION 9
Diabetes Education and Nutrition Eval  Increase Lantus to 80 units BID  Increase Humalog to 22 units qac  Mod correction scale qac + bedtime  Goal glucose 100-180  Outpt. endo follow-up  Outpt. optho, podiatry, nephrology  Plan to d/c on basal bolus consider adding SGLT2 or GLP-1 as outpatient.

## 2020-11-13 NOTE — CONSULT NOTE ADULT - SUBJECTIVE AND OBJECTIVE BOX
[facility] DIVISION OF KIDNEY DISEASES AND HYPERTENSION -- [phone]  -- INITIAL CONSULT NOTE  --------------------------------------------------------------------------------  HPI: Pt is a 64 y/o F w PMH of CKD 4, morbid obesity, asthma, HFpEF (EF 55 - 60% w grade II diastolic dysfunction, pulm HTN, DM w neuropathy and charcott foot, chronic back pain, chronic venous stasis presented to [facility] for worsening R foot wound with foul smelling discharge. ID         PAST HISTORY  --------------------------------------------------------------------------------  PAST MEDICAL & SURGICAL HISTORY:  CKD (chronic kidney disease)    Insomnia    Edema of lower extremity  on lasix    Vitamin D deficiency    Morbid Obesity    Cataract    Dyslipidemia    Deep Vein Thrombosis (DVT)  Left leg, 2004, treated and resolved    Spinal Stenosis, Lumbar    Cervical Stenosis of Spine    Endometrial Hyperplasia    HTN (Hypertension)    Diabetes Mellitus Type II    S/P cholecystectomy    S/P appendectomy    S/P total abdominal hysterectomy and bilateral salpingo-oophorectomy    H/O colonoscopy  1998    H/O laser iridotomy  left eye, 2016    Endometrial biopsy 12/02/09    Tonsillectomy as a child    Cervical Spinal Stenosis surgery x2 (01/2002, 7/2002)    D&amp;C 2008  hysteroscopy, endometrial hyperplasia, 2009    D&amp;C x2 1980&#x27;s    Cholecystectomy/appendectomy @ age 25    C Section 1994    Cataract extracted with lens implant 1998  Right      FAMILY HISTORY:  Family history of lung cancer (Grandparent)    Family history of bladder cancer    Family history of pancreatic cancer      PAST SOCIAL HISTORY:    ALLERGIES & MEDICATIONS  --------------------------------------------------------------------------------  Allergies    adhesives (Rash)  latex (Rash)  No Known Drug Allergies  wool- rash, itch (Other)    Intolerances      Standing Inpatient Medications  albuterol/ipratropium for Nebulization. 3 milliLiter(s) Nebulizer every 6 hours  AQUAPHOR (petrolatum Ointment) 1 Application(s) Topical two times a day  ascorbic acid 500 milliGRAM(s) Oral daily  aspirin enteric coated 81 milliGRAM(s) Oral daily  atorvastatin 80 milliGRAM(s) Oral at bedtime  bisacodyl Suppository 10 milliGRAM(s) Rectal once  chlorhexidine 2% Cloths 1 Application(s) Topical daily  collagenase Ointment 1 Application(s) Topical daily  dextrose 5%. 1000 milliLiter(s) IV Continuous <Continuous>  dextrose 50% Injectable 12.5 Gram(s) IV Push once  dextrose 50% Injectable 25 Gram(s) IV Push once  dextrose 50% Injectable 25 Gram(s) IV Push once  heparin   Injectable 7500 Unit(s) SubCutaneous every 8 hours  influenza   Vaccine 0.5 milliLiter(s) IntraMuscular once  insulin glargine Injectable (LANTUS) 72 Unit(s) SubCutaneous every morning  insulin glargine Injectable (LANTUS) 77 Unit(s) SubCutaneous at bedtime  insulin lispro (ADMELOG) corrective regimen sliding scale   SubCutaneous three times a day before meals  insulin lispro (ADMELOG) corrective regimen sliding scale   SubCutaneous at bedtime  insulin lispro Injectable (ADMELOG) 19 Unit(s) SubCutaneous three times a day before meals  multivitamin 1 Tablet(s) Oral daily  nystatin Powder 1 Application(s) Topical two times a day  oxyCODONE  ER Tablet 80 milliGRAM(s) Oral every 12 hours  piperacillin/tazobactam IVPB.. 3.375 Gram(s) IV Intermittent every 8 hours  polyethylene glycol 3350 17 Gram(s) Oral daily  senna 2 Tablet(s) Oral at bedtime    PRN Inpatient Medications  clonazePAM  Tablet 0.5 milliGRAM(s) Oral at bedtime PRN  dextrose 40% Gel 15 Gram(s) Oral once PRN  glucagon  Injectable 1 milliGRAM(s) IntraMuscular once PRN  oxyCODONE    IR 5 milliGRAM(s) Oral every 8 hours PRN      REVIEW OF SYSTEMS  --------------------------------------------------------------------------------  Gen: No fevers/chills  Skin: No rashes  Head/Eyes/Ears: Normal hearing,   Respiratory: No dyspnea, cough  CV: No chest pain  GI: No abdominal pain, diarrhea  : No dysuria, hematuria  MSK: No  edema  Heme: No easy bruising or bleeding  Psych: No significant depression    All other systems were reviewed and are negative, except as noted.    VITALS/PHYSICAL EXAM  --------------------------------------------------------------------------------  T(C): 36.7 (11-13-20 @ 09:20), Max: 36.8 (11-13-20 @ 00:32)  HR: 87 (11-13-20 @ 09:20) (79 - 87)  BP: 145/69 (11-13-20 @ 09:20) (128/73 - 159/67)  RR: 18 (11-13-20 @ 09:20) (18 - 20)  SpO2: 96% (11-13-20 @ 09:20) (95% - 96%)  Wt(kg): --        11-12-20 @ 07:01  -  11-13-20 @ 07:00  --------------------------------------------------------  IN: 0 mL / OUT: 1600 mL / NET: -1600 mL    11-13-20 @ 07:01  -  11-13-20 @ 13:40  --------------------------------------------------------  IN: 340 mL / OUT: 1800 mL / NET: -1460 mL      Physical Exam:  	Gen: NAD  	HEENT: MMM  	Pulm: CTA B/L  	CV: S1S2  	Abd: Soft, +BS   	Ext: No LE edema B/L  	Neuro: Awake  	Skin: Warm and dry  	Vascular access:    LABS/STUDIES  --------------------------------------------------------------------------------              13.8   8.01  >-----------<  218      [11-13-20 @ 09:22]              43.4     138  |  99  |  31  ----------------------------<  226      [11-13-20 @ 09:22]  4.9   |  25  |  1.78        Ca     9.8     [11-13-20 @ 09:22]            Creatinine Trend:  SCr 1.78 [11-13 @ 09:22]  SCr 2.02 [11-12 @ 07:39]  SCr 2.02 [11-11 @ 09:20]  SCr 2.07 [11-10 @ 09:35]  SCr 1.48 [11-09 @ 17:45]    Urinalysis - [07-20-20 @ 17:38]      Color Light Yellow / Appearance Clear / SG 1.007 / pH 6.0      Gluc Negative / Ketone Negative  / Bili Negative / Urobili Negative       Blood Negative / Protein Negative / Leuk Est Negative / Nitrite Negative      RBC  / WBC  / Hyaline  / Gran  / Sq Epi  / Non Sq Epi  / Bacteria       HbA1c 7.2      [11-07-18 @ 16:42]    HCV 0.12, Nonreact      [07-21-20 @ 09:27]     [facility] DIVISION OF KIDNEY DISEASES AND HYPERTENSION -- [phone]  -- INITIAL CONSULT NOTE  --------------------------------------------------------------------------------  HPI: Pt is a 64 y/o F w PMH of CKD 4, morbid obesity, asthma, HFpEF (EF 55 - 60% w grade II diastolic dysfunction, pulm HTN, DM w neuropathy and charcott foot, chronic back pain, chronic venous stasis presented to [facility] for worsening R foot wound with foul smelling discharge. ID recommended MRI of R foot to r/o osteomyelitis, however, patient does not fit in an MRI and ID suggested CT of R foot with contrast. Nephrology consulted for prevention of contrast induce nephropathy.     Upon review of labs on [facility], noted to have Scr of 1.47 11/9/20 on admission, which increased to 2.07 on 11/10/20, was 2.02 on 11/12/20 then today was 1.78 11/13/20. Scr was 1.86 prior to admission on 7/28/20. Baseline Scr ranges from around 1.6 to 1.8 last year. Had an episoe of JACQUES in July 202 which Scr increased to 2.41, but improved to 1.86 on discharge from here as well for suspected infection of L R foot.     Kidney hx: patient knows that she has CKD for about 5 years now, reported having kidney issues 2/2 to her DM condition. Does not follow with a nephrologist.     Patient was seen and examined at bedside. Reported welling well. Denies fever, chills, nausea, vomiting, diarrhea, LE edema or dysuria          PAST HISTORY  --------------------------------------------------------------------------------  PAST MEDICAL & SURGICAL HISTORY:  CKD (chronic kidney disease)    Insomnia    Edema of lower extremity  on lasix    Vitamin D deficiency    Morbid Obesity    Cataract    Dyslipidemia    Deep Vein Thrombosis (DVT)  Left leg, 2004, treated and resolved    Spinal Stenosis, Lumbar    Cervical Stenosis of Spine    Endometrial Hyperplasia    HTN (Hypertension)    Diabetes Mellitus Type II    S/P cholecystectomy    S/P appendectomy    S/P total abdominal hysterectomy and bilateral salpingo-oophorectomy    H/O colonoscopy  1998    H/O laser iridotomy  left eye, 2016    Endometrial biopsy 12/02/09    Tonsillectomy as a child    Cervical Spinal Stenosis surgery x2 (01/2002, 7/2002)    D&amp;C 2008  hysteroscopy, endometrial hyperplasia, 2009    D&amp;C x2 1980&#x27;s    Cholecystectomy/appendectomy @ age 25    C Section 1994    Cataract extracted with lens implant 1998  Right      FAMILY HISTORY:  Family history of lung cancer (Grandparent)    Family history of bladder cancer    Family history of pancreatic cancer      PAST SOCIAL HISTORY:    ALLERGIES & MEDICATIONS  --------------------------------------------------------------------------------  Allergies    adhesives (Rash)  latex (Rash)  No Known Drug Allergies  wool- rash, itch (Other)    Intolerances      Standing Inpatient Medications  albuterol/ipratropium for Nebulization. 3 milliLiter(s) Nebulizer every 6 hours  AQUAPHOR (petrolatum Ointment) 1 Application(s) Topical two times a day  ascorbic acid 500 milliGRAM(s) Oral daily  aspirin enteric coated 81 milliGRAM(s) Oral daily  atorvastatin 80 milliGRAM(s) Oral at bedtime  bisacodyl Suppository 10 milliGRAM(s) Rectal once  chlorhexidine 2% Cloths 1 Application(s) Topical daily  collagenase Ointment 1 Application(s) Topical daily  dextrose 5%. 1000 milliLiter(s) IV Continuous <Continuous>  dextrose 50% Injectable 12.5 Gram(s) IV Push once  dextrose 50% Injectable 25 Gram(s) IV Push once  dextrose 50% Injectable 25 Gram(s) IV Push once  heparin   Injectable 7500 Unit(s) SubCutaneous every 8 hours  influenza   Vaccine 0.5 milliLiter(s) IntraMuscular once  insulin glargine Injectable (LANTUS) 72 Unit(s) SubCutaneous every morning  insulin glargine Injectable (LANTUS) 77 Unit(s) SubCutaneous at bedtime  insulin lispro (ADMELOG) corrective regimen sliding scale   SubCutaneous three times a day before meals  insulin lispro (ADMELOG) corrective regimen sliding scale   SubCutaneous at bedtime  insulin lispro Injectable (ADMELOG) 19 Unit(s) SubCutaneous three times a day before meals  multivitamin 1 Tablet(s) Oral daily  nystatin Powder 1 Application(s) Topical two times a day  oxyCODONE  ER Tablet 80 milliGRAM(s) Oral every 12 hours  piperacillin/tazobactam IVPB.. 3.375 Gram(s) IV Intermittent every 8 hours  polyethylene glycol 3350 17 Gram(s) Oral daily  senna 2 Tablet(s) Oral at bedtime    PRN Inpatient Medications  clonazePAM  Tablet 0.5 milliGRAM(s) Oral at bedtime PRN  dextrose 40% Gel 15 Gram(s) Oral once PRN  glucagon  Injectable 1 milliGRAM(s) IntraMuscular once PRN  oxyCODONE    IR 5 milliGRAM(s) Oral every 8 hours PRN      REVIEW OF SYSTEMS  --------------------------------------------------------------------------------  Gen: No fevers/chills  Respiratory: No dyspnea, cough  CV: No chest pain  GI: No abdominal pain, diarrhea  : No dysuria, hematuria  MSK: ++ chronic b/l LE edema with hyperpigmentation       All other systems were reviewed and are negative, except as noted.    VITALS/PHYSICAL EXAM  --------------------------------------------------------------------------------  T(C): 36.7 (11-13-20 @ 09:20), Max: 36.8 (11-13-20 @ 00:32)  HR: 87 (11-13-20 @ 09:20) (79 - 87)  BP: 145/69 (11-13-20 @ 09:20) (128/73 - 159/67)  RR: 18 (11-13-20 @ 09:20) (18 - 20)  SpO2: 96% (11-13-20 @ 09:20) (95% - 96%)  Wt(kg): --        11-12-20 @ 07:01  -  11-13-20 @ 07:00  --------------------------------------------------------  IN: 0 mL / OUT: 1600 mL / NET: -1600 mL    11-13-20 @ 07:01  -  11-13-20 @ 13:40  --------------------------------------------------------  IN: 340 mL / OUT: 1800 mL / NET: -1460 mL      Physical Exam:  	Gen: NAD, morbidly obese female   	HEENT: MMM  	Pulm: b/l decrease breath sounds, 2/2 to body habitus   	CV: S1S2+  	Abd: Soft, +BS   	Ext: 2+ edema of LE b/l w hyperpigmentation, R foot drapped with bandages  	Neuro: Awake  	Skin: Warm and dry  	Vascular access: IV lines    LABS/STUDIES  --------------------------------------------------------------------------------              13.8   8.01  >-----------<  218      [11-13-20 @ 09:22]              43.4     138  |  99  |  31  ----------------------------<  226      [11-13-20 @ 09:22]  4.9   |  25  |  1.78        Ca     9.8     [11-13-20 @ 09:22]            Creatinine Trend:  SCr 1.78 [11-13 @ 09:22]  SCr 2.02 [11-12 @ 07:39]  SCr 2.02 [11-11 @ 09:20]  SCr 2.07 [11-10 @ 09:35]  SCr 1.48 [11-09 @ 17:45]    Urinalysis - [07-20-20 @ 17:38]      Color Light Yellow / Appearance Clear / SG 1.007 / pH 6.0      Gluc Negative / Ketone Negative  / Bili Negative / Urobili Negative       Blood Negative / Protein Negative / Leuk Est Negative / Nitrite Negative      RBC  / WBC  / Hyaline  / Gran  / Sq Epi  / Non Sq Epi  / Bacteria       HbA1c 7.2      [11-07-18 @ 16:42]    HCV 0.12, Nonreact      [07-21-20 @ 09:27]

## 2020-11-13 NOTE — CONSULT NOTE ADULT - PROBLEM SELECTOR RECOMMENDATION 9
Pt w CKD 3/4 2/2 to DM nephropathy? as per patient?, presented to [facility] for R foot infection. Scr of 1.47 11/9/20 on admission, which increased to 2.07 on 11/10/20, was 2.02 on 11/12/20 then today was 1.78 11/13/20. Scr was 1.86 prior to admission on 7/28/20. Baseline Scr ranges from around 1.6 to 1.8 last year (2019). Planned by primary is for patient to get CT R foot w IV contrast to r/o osteomyelitis as patient cannot fit in an MRI. For prevention for contrast induce nephropathy, patient would need hydration. Pt currently appears euvolemic/ mild hypervolemic. Lasix was held on this admission. Suggest to obtain CXR to assess fluid status in the lungs. Would suggest to encorage PO hydration for now.    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 as per patient?, presented to [facility] for R foot infection. Scr of 1.47 11/9/20 on admission, which increased to 2.07 on 11/10/20, was 2.02 on 11/12/20 then today was 1.78 11/13/20. Scr was 1.86 prior to admission on 7/28/20. Baseline Scr ranges from around 1.6 to 1.8 last year (2019). Planned by primary is for patient to get CT R foot w IV contrast to r/o osteomyelitis as patient cannot fit in an MRI. Patient has moderate risk for contrast induce nephropathy. Lasix was held on this admission. Agree with holding lasix, however, would not suggest to give any extra fluids as patient appears hypervolemic, w grade II diastolic HF and moderat to severe pulmonary hypertension. Suggest to obtain CXR to assess fluid status in the lungs. Would suggest to encorage PO hydration for now.    If any questions, please feel free to contact me     Valentina Ordoñez  Nephrology Fellow  [facility] Pager: [phone]  [facility] Pager: [phone] Pt w CKD 3/4 2/2 to DM nephropathy? as per patient?, presented to [facility] for R foot infection. Scr of 1.47 11/9/20 on admission, which increased to 2.07 on 11/10/20, was 2.02 on 11/12/20 then today was 1.78 11/13/20. Scr was 1.86 prior to admission on 7/28/20. Baseline Scr ranges from around 1.6 to 1.8 last year (2019). Planned by primary is for patient to get CT R foot w IV contrast to r/o osteomyelitis as patient cannot fit in an MRI. Patient has moderate risk for contrast induce nephropathy. Lasix was held on this admission. Agree with holding lasix, however, would not suggest to give any extra fluids as patient appears hypervolemic, w grade II diastolic HF and moderat to severe pulmonary hypertension. Suggest to obtain CXR to assess fluid status in the lungs. Would suggest to encourage PO hydration for now. Please stop Vit C     If any questions, please feel free to contact me     Valentina Ordoñez  Nephrology Fellow  [facility] Pager: [phone]  [facility] Pager: [phone]

## 2020-11-13 NOTE — PROGRESS NOTE ADULT - SUBJECTIVE AND OBJECTIVE BOX
Patient is a 65y old  Female who presents with a chief complaint of right foot cellulitis (12 Nov 2020 13:20)    SUBJECTIVE / OVERNIGHT EVENTS: no acute events overnight, still no BM     MEDICATIONS  (STANDING):  albuterol/ipratropium for Nebulization. 3 milliLiter(s) Nebulizer every 6 hours  ascorbic acid 500 milliGRAM(s) Oral daily  aspirin enteric coated 81 milliGRAM(s) Oral daily  atorvastatin 80 milliGRAM(s) Oral at bedtime  chlorhexidine 2% Cloths 1 Application(s) Topical daily  collagenase Ointment 1 Application(s) Topical daily  dextrose 5%. 1000 milliLiter(s) (50 mL/Hr) IV Continuous <Continuous>  dextrose 50% Injectable 12.5 Gram(s) IV Push once  dextrose 50% Injectable 25 Gram(s) IV Push once  dextrose 50% Injectable 25 Gram(s) IV Push once  heparin   Injectable 7500 Unit(s) SubCutaneous every 8 hours  influenza   Vaccine 0.5 milliLiter(s) IntraMuscular once  insulin glargine Injectable (LANTUS) 72 Unit(s) SubCutaneous every morning  insulin glargine Injectable (LANTUS) 77 Unit(s) SubCutaneous at bedtime  insulin lispro (ADMELOG) corrective regimen sliding scale   SubCutaneous three times a day before meals  insulin lispro (ADMELOG) corrective regimen sliding scale   SubCutaneous at bedtime  insulin lispro Injectable (ADMELOG) 19 Unit(s) SubCutaneous three times a day before meals  multivitamin 1 Tablet(s) Oral daily  nystatin Powder 1 Application(s) Topical two times a day  oxyCODONE  ER Tablet 80 milliGRAM(s) Oral every 12 hours  piperacillin/tazobactam IVPB.. 3.375 Gram(s) IV Intermittent every 8 hours  polyethylene glycol 3350 17 Gram(s) Oral daily  senna 2 Tablet(s) Oral at bedtime    MEDICATIONS  (PRN):  clonazePAM  Tablet 0.5 milliGRAM(s) Oral at bedtime PRN ancxiety  dextrose 40% Gel 15 Gram(s) Oral once PRN Blood Glucose LESS THAN 70 milliGRAM(s)/deciliter  glucagon  Injectable 1 milliGRAM(s) IntraMuscular once PRN Glucose LESS THAN 70 milligrams/deciliter  oxyCODONE    IR 5 milliGRAM(s) Oral every 8 hours PRN Breakthrough Pain      Vital Signs Last 24 Hrs  T(C): 36.7 (13 Nov 2020 09:20), Max: 36.8 (13 Nov 2020 00:32)  T(F): 98 (13 Nov 2020 09:20), Max: 98.3 (13 Nov 2020 00:32)  HR: 87 (13 Nov 2020 09:20) (78 - 87)  BP: 145/69 (13 Nov 2020 09:20) (111/79 - 159/67)  BP(mean): --  RR: 18 (13 Nov 2020 09:20) (18 - 20)  SpO2: 96% (13 Nov 2020 09:20) (95% - 96%)  CAPILLARY BLOOD GLUCOSE      POCT Blood Glucose.: 229 mg/dL (13 Nov 2020 07:49)  POCT Blood Glucose.: 199 mg/dL (12 Nov 2020 21:31)  POCT Blood Glucose.: 214 mg/dL (12 Nov 2020 17:21)    I&O's Summary    12 Nov 2020 07:01  -  13 Nov 2020 07:00  --------------------------------------------------------  IN: 0 mL / OUT: 1600 mL / NET: -1600 mL    13 Nov 2020 07:01  -  13 Nov 2020 12:19  --------------------------------------------------------  IN: 240 mL / OUT: 500 mL / NET: -260 mL      PHYSICAL EXAM:  GENERAL: NAD  EYES:  conjunctiva and sclera clear  CHEST/LUNG: Clear to auscultation bilaterally  HEART: +s1/S2, reg   ABDOMEN: Soft, Nontender, Nondistended  EXTREMITIES: RLE with dressing in place  PSYCH: AAOx3      LABS:                        13.8   8.01  )-----------( 218      ( 13 Nov 2020 09:22 )             43.4     11-13    138  |  99  |  31<H>  ----------------------------<  226<H>  4.9   |  25  |  1.78<H>    Ca    9.8      13 Nov 2020 09:22

## 2020-11-13 NOTE — PROGRESS NOTE ADULT - SUBJECTIVE AND OBJECTIVE BOX
Follow Up:  diabetic foot infection and cellulitis    Interval History: slight improvement in the erythema of R foot, the lateral L leg has some erythema, plan for CT    ROS:      All other systems negative    Constitutional: no fever, no chills  Cardiovascular:  no chest pain, no palpitation  Respiratory:  no SOB, no cough  GI:  no abd pain, no vomiting, no diarrhea  urinary: no dysuria, no hematuria, no flank pain  musculoskeletal: slight improvement in R foot redness and pain,  some pain and erythema at the L lateral leg  skin:  no rash, R foot wond  neurology:  no headache, no seizure        Allergies  adhesives (Rash)  latex (Rash)  No Known Drug Allergies  wool- rash, itch (Other)        ANTIMICROBIALS:  piperacillin/tazobactam IVPB.. 3.375 every 8 hours      OTHER MEDS:  albuterol/ipratropium for Nebulization. 3 milliLiter(s) Nebulizer every 6 hours  AQUAPHOR (petrolatum Ointment) 1 Application(s) Topical two times a day  ascorbic acid 500 milliGRAM(s) Oral daily  aspirin enteric coated 81 milliGRAM(s) Oral daily  atorvastatin 80 milliGRAM(s) Oral at bedtime  chlorhexidine 2% Cloths 1 Application(s) Topical daily  clonazePAM  Tablet 0.5 milliGRAM(s) Oral at bedtime PRN  collagenase Ointment 1 Application(s) Topical daily  dextrose 40% Gel 15 Gram(s) Oral once PRN  dextrose 5%. 1000 milliLiter(s) IV Continuous <Continuous>  dextrose 50% Injectable 12.5 Gram(s) IV Push once  dextrose 50% Injectable 25 Gram(s) IV Push once  dextrose 50% Injectable 25 Gram(s) IV Push once  glucagon  Injectable 1 milliGRAM(s) IntraMuscular once PRN  heparin   Injectable 7500 Unit(s) SubCutaneous every 8 hours  influenza   Vaccine 0.5 milliLiter(s) IntraMuscular once  insulin glargine Injectable (LANTUS) 72 Unit(s) SubCutaneous every morning  insulin glargine Injectable (LANTUS) 77 Unit(s) SubCutaneous at bedtime  insulin lispro (ADMELOG) corrective regimen sliding scale   SubCutaneous three times a day before meals  insulin lispro (ADMELOG) corrective regimen sliding scale   SubCutaneous at bedtime  insulin lispro Injectable (ADMELOG) 19 Unit(s) SubCutaneous three times a day before meals  multivitamin 1 Tablet(s) Oral daily  nystatin Powder 1 Application(s) Topical two times a day  oxyCODONE    IR 5 milliGRAM(s) Oral every 8 hours PRN  oxyCODONE  ER Tablet 80 milliGRAM(s) Oral every 12 hours  polyethylene glycol 3350 17 Gram(s) Oral daily  senna 2 Tablet(s) Oral at bedtime      Vital Signs Last 24 Hrs  T(C): 36.8 (13 Nov 2020 14:32), Max: 36.8 (13 Nov 2020 00:32)  T(F): 98.3 (13 Nov 2020 14:32), Max: 98.3 (13 Nov 2020 00:32)  HR: 90 (13 Nov 2020 14:32) (79 - 90)  BP: 171/69 (13 Nov 2020 14:32) (128/73 - 171/69)  BP(mean): --  RR: 17 (13 Nov 2020 14:32) (17 - 18)  SpO2: 95% (13 Nov 2020 14:32) (95% - 96%)    Physical Exam:  General:    NAD,  non toxic, sitting on a chair  Cardio:     regular S1, S2,  no murmur  Respiratory:    clear b/l,    no wheezing  abd:     soft,   BS +,   no tenderness  :   no CVAT,  no suprapubic tenderness,   no  de la torre  Musculoskeletal: R plantar foot wound with erythema on the dorsal and plantar surface slightly better,  L lateral LE slightly erythematous on the chronic lesion  vascular: no phlebitis  Skin:    no rash                          13.8   8.01  )-----------( 218      ( 13 Nov 2020 09:22 )             43.4       11-13    138  |  99  |  31<H>  ----------------------------<  226<H>  4.9   |  25  |  1.78<H>    Ca    9.8      13 Nov 2020 09:22            MICROBIOLOGY:  v  .Abscess right foot wound  11-10-20   Culture yields >4 types of aerobic and/or anaerobic bacteria  Call client services within 7 days if further workup is clinically  indicated. Culture includes  Moderate Staphylococcus aureus  --  Staphylococcus aureus      .Blood Blood-Peripheral  11-10-20   No growth to date.  --  --                RADIOLOGY:  Images independently visualized and reviewed personally, findings as below  < from: Xray Foot AP + Lateral + Oblique, Right (11.09.20 @ 17:43) >  IMPRESSION:  1. No soft tissue gas or acute osseous destruction is appreciated.  2. Charcot changes of the midfoot are again seen.  3. MRI may be helpful for further evaluation of osteomyelitis as warranted.

## 2020-11-14 LAB
ANION GAP SERPL CALC-SCNC: 12 MMOL/L — SIGNIFICANT CHANGE UP (ref 5–17)
BUN SERPL-MCNC: 32 MG/DL — HIGH (ref 7–23)
CALCIUM SERPL-MCNC: 9.7 MG/DL — SIGNIFICANT CHANGE UP (ref 8.4–10.5)
CHLORIDE SERPL-SCNC: 99 MMOL/L — SIGNIFICANT CHANGE UP (ref 96–108)
CO2 SERPL-SCNC: 26 MMOL/L — SIGNIFICANT CHANGE UP (ref 22–31)
CREAT SERPL-MCNC: 1.77 MG/DL — HIGH (ref 0.5–1.3)
GLUCOSE BLDC GLUCOMTR-MCNC: 176 MG/DL — HIGH (ref 70–99)
GLUCOSE BLDC GLUCOMTR-MCNC: 185 MG/DL — HIGH (ref 70–99)
GLUCOSE BLDC GLUCOMTR-MCNC: 190 MG/DL — HIGH (ref 70–99)
GLUCOSE BLDC GLUCOMTR-MCNC: 225 MG/DL — HIGH (ref 70–99)
GLUCOSE SERPL-MCNC: 213 MG/DL — HIGH (ref 70–99)
HCT VFR BLD CALC: 40.9 % — SIGNIFICANT CHANGE UP (ref 34.5–45)
HGB BLD-MCNC: 13.1 G/DL — SIGNIFICANT CHANGE UP (ref 11.5–15.5)
MCHC RBC-ENTMCNC: 29.8 PG — SIGNIFICANT CHANGE UP (ref 27–34)
MCHC RBC-ENTMCNC: 32 GM/DL — SIGNIFICANT CHANGE UP (ref 32–36)
MCV RBC AUTO: 93.2 FL — SIGNIFICANT CHANGE UP (ref 80–100)
NRBC # BLD: 0 /100 WBCS — SIGNIFICANT CHANGE UP (ref 0–0)
PLATELET # BLD AUTO: 225 K/UL — SIGNIFICANT CHANGE UP (ref 150–400)
POTASSIUM SERPL-MCNC: 4.5 MMOL/L — SIGNIFICANT CHANGE UP (ref 3.5–5.3)
POTASSIUM SERPL-SCNC: 4.5 MMOL/L — SIGNIFICANT CHANGE UP (ref 3.5–5.3)
RBC # BLD: 4.39 M/UL — SIGNIFICANT CHANGE UP (ref 3.8–5.2)
RBC # FLD: 13.6 % — SIGNIFICANT CHANGE UP (ref 10.3–14.5)
SODIUM SERPL-SCNC: 137 MMOL/L — SIGNIFICANT CHANGE UP (ref 135–145)
WBC # BLD: 9.56 K/UL — SIGNIFICANT CHANGE UP (ref 3.8–10.5)
WBC # FLD AUTO: 9.56 K/UL — SIGNIFICANT CHANGE UP (ref 3.8–10.5)

## 2020-11-14 PROCEDURE — 99233 SBSQ HOSP IP/OBS HIGH 50: CPT

## 2020-11-14 RX ADMIN — HEPARIN SODIUM 7500 UNIT(S): 5000 INJECTION INTRAVENOUS; SUBCUTANEOUS at 15:43

## 2020-11-14 RX ADMIN — Medication 81 MILLIGRAM(S): at 12:51

## 2020-11-14 RX ADMIN — POLYETHYLENE GLYCOL 3350 17 GRAM(S): 17 POWDER, FOR SOLUTION ORAL at 12:50

## 2020-11-14 RX ADMIN — INSULIN GLARGINE 80 UNIT(S): 100 INJECTION, SOLUTION SUBCUTANEOUS at 08:31

## 2020-11-14 RX ADMIN — Medication 1: at 08:26

## 2020-11-14 RX ADMIN — ATORVASTATIN CALCIUM 80 MILLIGRAM(S): 80 TABLET, FILM COATED ORAL at 21:59

## 2020-11-14 RX ADMIN — AMPICILLIN SODIUM AND SULBACTAM SODIUM 200 GRAM(S): 250; 125 INJECTION, POWDER, FOR SUSPENSION INTRAMUSCULAR; INTRAVENOUS at 05:35

## 2020-11-14 RX ADMIN — AMPICILLIN SODIUM AND SULBACTAM SODIUM 200 GRAM(S): 250; 125 INJECTION, POWDER, FOR SUSPENSION INTRAMUSCULAR; INTRAVENOUS at 15:42

## 2020-11-14 RX ADMIN — Medication 22 UNIT(S): at 17:31

## 2020-11-14 RX ADMIN — Medication 1 APPLICATION(S): at 05:36

## 2020-11-14 RX ADMIN — AMPICILLIN SODIUM AND SULBACTAM SODIUM 200 GRAM(S): 250; 125 INJECTION, POWDER, FOR SUSPENSION INTRAMUSCULAR; INTRAVENOUS at 21:58

## 2020-11-14 RX ADMIN — Medication 2: at 12:50

## 2020-11-14 RX ADMIN — Medication 3 MILLILITER(S): at 17:31

## 2020-11-14 RX ADMIN — Medication 22 UNIT(S): at 12:50

## 2020-11-14 RX ADMIN — SENNA PLUS 2 TABLET(S): 8.6 TABLET ORAL at 21:59

## 2020-11-14 RX ADMIN — OXYCODONE HYDROCHLORIDE 5 MILLIGRAM(S): 5 TABLET ORAL at 04:00

## 2020-11-14 RX ADMIN — Medication 1: at 17:31

## 2020-11-14 RX ADMIN — INSULIN GLARGINE 80 UNIT(S): 100 INJECTION, SOLUTION SUBCUTANEOUS at 21:59

## 2020-11-14 RX ADMIN — OXYCODONE HYDROCHLORIDE 80 MILLIGRAM(S): 5 TABLET ORAL at 17:31

## 2020-11-14 RX ADMIN — HEPARIN SODIUM 7500 UNIT(S): 5000 INJECTION INTRAVENOUS; SUBCUTANEOUS at 05:35

## 2020-11-14 RX ADMIN — Medication 3 MILLILITER(S): at 05:35

## 2020-11-14 RX ADMIN — OXYCODONE HYDROCHLORIDE 5 MILLIGRAM(S): 5 TABLET ORAL at 03:07

## 2020-11-14 RX ADMIN — OXYCODONE HYDROCHLORIDE 80 MILLIGRAM(S): 5 TABLET ORAL at 05:40

## 2020-11-14 RX ADMIN — Medication 3 MILLILITER(S): at 23:31

## 2020-11-14 RX ADMIN — Medication 22 UNIT(S): at 08:26

## 2020-11-14 RX ADMIN — OXYCODONE HYDROCHLORIDE 5 MILLIGRAM(S): 5 TABLET ORAL at 13:54

## 2020-11-14 RX ADMIN — OXYCODONE HYDROCHLORIDE 5 MILLIGRAM(S): 5 TABLET ORAL at 12:54

## 2020-11-14 RX ADMIN — CHLORHEXIDINE GLUCONATE 1 APPLICATION(S): 213 SOLUTION TOPICAL at 12:54

## 2020-11-14 RX ADMIN — NYSTATIN CREAM 1 APPLICATION(S): 100000 CREAM TOPICAL at 17:33

## 2020-11-14 RX ADMIN — Medication 1 TABLET(S): at 12:50

## 2020-11-14 RX ADMIN — HEPARIN SODIUM 7500 UNIT(S): 5000 INJECTION INTRAVENOUS; SUBCUTANEOUS at 21:59

## 2020-11-14 RX ADMIN — Medication 1 APPLICATION(S): at 17:32

## 2020-11-14 RX ADMIN — Medication 1 APPLICATION(S): at 12:51

## 2020-11-14 RX ADMIN — Medication 3 MILLILITER(S): at 12:50

## 2020-11-14 NOTE — PROGRESS NOTE ADULT - PROBLEM SELECTOR PLAN 1
History of chronic wound and charcot foot with concern for overlying infection due to new erythema and reported drainage  - c/w Unasyn, per ID  - Podiatry consult - debrided at bedside and cultures obtained. Follow up wound cultures - Staph aureus and contaminant.   - DVT US - unable to visualize R common femoral, but otherwise negative  - repeat arterial studies ordered which show mild decrease in flow, appreciate vascular recs   -MRI foot not done as machine unable to accommodate patient, will get CT of the foot with contrast, once cleared by renal (consult placed)  -Consider NM bone scan.   -c/w current management  -will try compression stockings

## 2020-11-15 LAB
ANION GAP SERPL CALC-SCNC: 13 MMOL/L — SIGNIFICANT CHANGE UP (ref 5–17)
BUN SERPL-MCNC: 32 MG/DL — HIGH (ref 7–23)
CALCIUM SERPL-MCNC: 9.7 MG/DL — SIGNIFICANT CHANGE UP (ref 8.4–10.5)
CHLORIDE SERPL-SCNC: 100 MMOL/L — SIGNIFICANT CHANGE UP (ref 96–108)
CO2 SERPL-SCNC: 26 MMOL/L — SIGNIFICANT CHANGE UP (ref 22–31)
CREAT SERPL-MCNC: 1.55 MG/DL — HIGH (ref 0.5–1.3)
CULTURE RESULTS: SIGNIFICANT CHANGE UP
CULTURE RESULTS: SIGNIFICANT CHANGE UP
GLUCOSE BLDC GLUCOMTR-MCNC: 128 MG/DL — HIGH (ref 70–99)
GLUCOSE BLDC GLUCOMTR-MCNC: 155 MG/DL — HIGH (ref 70–99)
GLUCOSE BLDC GLUCOMTR-MCNC: 182 MG/DL — HIGH (ref 70–99)
GLUCOSE BLDC GLUCOMTR-MCNC: 215 MG/DL — HIGH (ref 70–99)
GLUCOSE SERPL-MCNC: 273 MG/DL — HIGH (ref 70–99)
HCT VFR BLD CALC: 40.6 % — SIGNIFICANT CHANGE UP (ref 34.5–45)
HGB BLD-MCNC: 12.6 G/DL — SIGNIFICANT CHANGE UP (ref 11.5–15.5)
MCHC RBC-ENTMCNC: 29.3 PG — SIGNIFICANT CHANGE UP (ref 27–34)
MCHC RBC-ENTMCNC: 31 GM/DL — LOW (ref 32–36)
MCV RBC AUTO: 94.4 FL — SIGNIFICANT CHANGE UP (ref 80–100)
NRBC # BLD: 0 /100 WBCS — SIGNIFICANT CHANGE UP (ref 0–0)
PLATELET # BLD AUTO: 218 K/UL — SIGNIFICANT CHANGE UP (ref 150–400)
POTASSIUM SERPL-MCNC: 4.6 MMOL/L — SIGNIFICANT CHANGE UP (ref 3.5–5.3)
POTASSIUM SERPL-SCNC: 4.6 MMOL/L — SIGNIFICANT CHANGE UP (ref 3.5–5.3)
RBC # BLD: 4.3 M/UL — SIGNIFICANT CHANGE UP (ref 3.8–5.2)
RBC # FLD: 13.8 % — SIGNIFICANT CHANGE UP (ref 10.3–14.5)
SODIUM SERPL-SCNC: 139 MMOL/L — SIGNIFICANT CHANGE UP (ref 135–145)
SPECIMEN SOURCE: SIGNIFICANT CHANGE UP
SPECIMEN SOURCE: SIGNIFICANT CHANGE UP
WBC # BLD: 8.43 K/UL — SIGNIFICANT CHANGE UP (ref 3.8–10.5)
WBC # FLD AUTO: 8.43 K/UL — SIGNIFICANT CHANGE UP (ref 3.8–10.5)

## 2020-11-15 PROCEDURE — 99233 SBSQ HOSP IP/OBS HIGH 50: CPT

## 2020-11-15 RX ORDER — INSULIN LISPRO 100/ML
VIAL (ML) SUBCUTANEOUS
Refills: 0 | Status: DISCONTINUED | OUTPATIENT
Start: 2020-11-15 | End: 2020-11-25

## 2020-11-15 RX ORDER — INSULIN LISPRO 100/ML
VIAL (ML) SUBCUTANEOUS AT BEDTIME
Refills: 0 | Status: DISCONTINUED | OUTPATIENT
Start: 2020-11-15 | End: 2020-11-25

## 2020-11-15 RX ADMIN — OXYCODONE HYDROCHLORIDE 80 MILLIGRAM(S): 5 TABLET ORAL at 05:16

## 2020-11-15 RX ADMIN — Medication 22 UNIT(S): at 17:25

## 2020-11-15 RX ADMIN — Medication 3 MILLILITER(S): at 22:12

## 2020-11-15 RX ADMIN — Medication 1 APPLICATION(S): at 17:26

## 2020-11-15 RX ADMIN — POLYETHYLENE GLYCOL 3350 17 GRAM(S): 17 POWDER, FOR SOLUTION ORAL at 12:09

## 2020-11-15 RX ADMIN — INSULIN GLARGINE 80 UNIT(S): 100 INJECTION, SOLUTION SUBCUTANEOUS at 08:53

## 2020-11-15 RX ADMIN — Medication 22 UNIT(S): at 12:00

## 2020-11-15 RX ADMIN — AMPICILLIN SODIUM AND SULBACTAM SODIUM 200 GRAM(S): 250; 125 INJECTION, POWDER, FOR SUSPENSION INTRAMUSCULAR; INTRAVENOUS at 05:15

## 2020-11-15 RX ADMIN — OXYCODONE HYDROCHLORIDE 80 MILLIGRAM(S): 5 TABLET ORAL at 17:25

## 2020-11-15 RX ADMIN — Medication 1 TABLET(S): at 12:09

## 2020-11-15 RX ADMIN — HEPARIN SODIUM 7500 UNIT(S): 5000 INJECTION INTRAVENOUS; SUBCUTANEOUS at 20:56

## 2020-11-15 RX ADMIN — Medication 3 MILLILITER(S): at 17:25

## 2020-11-15 RX ADMIN — Medication 2: at 12:01

## 2020-11-15 RX ADMIN — Medication 0.5 MILLIGRAM(S): at 20:55

## 2020-11-15 RX ADMIN — AMPICILLIN SODIUM AND SULBACTAM SODIUM 200 GRAM(S): 250; 125 INJECTION, POWDER, FOR SUSPENSION INTRAMUSCULAR; INTRAVENOUS at 13:48

## 2020-11-15 RX ADMIN — CHLORHEXIDINE GLUCONATE 1 APPLICATION(S): 213 SOLUTION TOPICAL at 12:09

## 2020-11-15 RX ADMIN — Medication 81 MILLIGRAM(S): at 12:09

## 2020-11-15 RX ADMIN — Medication 3 MILLILITER(S): at 05:15

## 2020-11-15 RX ADMIN — HEPARIN SODIUM 7500 UNIT(S): 5000 INJECTION INTRAVENOUS; SUBCUTANEOUS at 05:15

## 2020-11-15 RX ADMIN — SENNA PLUS 2 TABLET(S): 8.6 TABLET ORAL at 20:56

## 2020-11-15 RX ADMIN — Medication 3 MILLILITER(S): at 12:09

## 2020-11-15 RX ADMIN — Medication 1 APPLICATION(S): at 12:10

## 2020-11-15 RX ADMIN — INSULIN GLARGINE 80 UNIT(S): 100 INJECTION, SOLUTION SUBCUTANEOUS at 21:06

## 2020-11-15 RX ADMIN — OXYCODONE HYDROCHLORIDE 5 MILLIGRAM(S): 5 TABLET ORAL at 05:00

## 2020-11-15 RX ADMIN — ATORVASTATIN CALCIUM 80 MILLIGRAM(S): 80 TABLET, FILM COATED ORAL at 20:56

## 2020-11-15 RX ADMIN — NYSTATIN CREAM 1 APPLICATION(S): 100000 CREAM TOPICAL at 17:26

## 2020-11-15 RX ADMIN — Medication 1: at 08:52

## 2020-11-15 RX ADMIN — OXYCODONE HYDROCHLORIDE 5 MILLIGRAM(S): 5 TABLET ORAL at 04:12

## 2020-11-15 RX ADMIN — AMPICILLIN SODIUM AND SULBACTAM SODIUM 200 GRAM(S): 250; 125 INJECTION, POWDER, FOR SUSPENSION INTRAMUSCULAR; INTRAVENOUS at 20:55

## 2020-11-15 RX ADMIN — Medication 22 UNIT(S): at 08:53

## 2020-11-15 RX ADMIN — Medication 1 APPLICATION(S): at 05:16

## 2020-11-15 RX ADMIN — HEPARIN SODIUM 7500 UNIT(S): 5000 INJECTION INTRAVENOUS; SUBCUTANEOUS at 13:48

## 2020-11-15 RX ADMIN — OXYCODONE HYDROCHLORIDE 5 MILLIGRAM(S): 5 TABLET ORAL at 13:06

## 2020-11-15 RX ADMIN — INFLUENZA VIRUS VACCINE 0.5 MILLILITER(S): 15; 15; 15; 15 SUSPENSION INTRAMUSCULAR at 12:04

## 2020-11-15 RX ADMIN — OXYCODONE HYDROCHLORIDE 5 MILLIGRAM(S): 5 TABLET ORAL at 12:06

## 2020-11-15 NOTE — CHART NOTE - NSCHARTNOTEFT_GEN_A_CORE
Reviewed weekend glucose values. Glucose from 170s-low 200s.     Plan:  -test BG AC/HS  -c/w Lantus 80 units BID  -c/w Admelog 22 units TID w/meals  -Pt on low correction scales. Changed to moderate correction scale AC and Mod HS scale. May need high insulin scales given degree of insulin resistance.   Goal glucose 100-180  Outpt. endo follow-up  Outpt. optho, podiatry, nephrology  Plan to d/c on basal bolus consider adding SGLT2 or GLP-1 as outpatient.    pager: [phone]   office:  [phone]

## 2020-11-15 NOTE — PROGRESS NOTE ADULT - SUBJECTIVE AND OBJECTIVE BOX
Patient is a 65y old  Female who presents with a chief complaint of Right foot infection (14 Nov 2020 10:00)        SUBJECTIVE / OVERNIGHT EVENTS: Patient denies CP. She reports back pain.       MEDICATIONS  (STANDING):  albuterol/ipratropium for Nebulization. 3 milliLiter(s) Nebulizer every 6 hours  ampicillin/sulbactam  IVPB 3 Gram(s) IV Intermittent every 8 hours  AQUAPHOR (petrolatum Ointment) 1 Application(s) Topical two times a day  aspirin enteric coated 81 milliGRAM(s) Oral daily  atorvastatin 80 milliGRAM(s) Oral at bedtime  chlorhexidine 2% Cloths 1 Application(s) Topical daily  collagenase Ointment 1 Application(s) Topical daily  dextrose 5%. 1000 milliLiter(s) (50 mL/Hr) IV Continuous <Continuous>  dextrose 50% Injectable 12.5 Gram(s) IV Push once  dextrose 50% Injectable 25 Gram(s) IV Push once  dextrose 50% Injectable 25 Gram(s) IV Push once  heparin   Injectable 7500 Unit(s) SubCutaneous every 8 hours  insulin glargine Injectable (LANTUS) 80 Unit(s) SubCutaneous every morning  insulin glargine Injectable (LANTUS) 80 Unit(s) SubCutaneous at bedtime  insulin lispro (ADMELOG) corrective regimen sliding scale   SubCutaneous at bedtime  insulin lispro (ADMELOG) corrective regimen sliding scale   SubCutaneous three times a day before meals  insulin lispro Injectable (ADMELOG) 22 Unit(s) SubCutaneous three times a day before meals  multivitamin 1 Tablet(s) Oral daily  nystatin Powder 1 Application(s) Topical two times a day  oxyCODONE  ER Tablet 80 milliGRAM(s) Oral every 12 hours  senna 2 Tablet(s) Oral at bedtime    MEDICATIONS  (PRN):  clonazePAM  Tablet 0.5 milliGRAM(s) Oral at bedtime PRN ancxiety  dextrose 40% Gel 15 Gram(s) Oral once PRN Blood Glucose LESS THAN 70 milliGRAM(s)/deciliter  glucagon  Injectable 1 milliGRAM(s) IntraMuscular once PRN Glucose LESS THAN 70 milligrams/deciliter  oxyCODONE    IR 5 milliGRAM(s) Oral every 8 hours PRN Breakthrough Pain      Vital Signs Last 24 Hrs  T(C): 36.7 (15 Nov 2020 15:47), Max: 36.8 (15 Nov 2020 08:42)  T(F): 98 (15 Nov 2020 15:47), Max: 98.2 (15 Nov 2020 08:42)  HR: 80 (15 Nov 2020 15:47) (75 - 86)  BP: 144/73 (15 Nov 2020 15:47) (134/63 - 163/69)  BP(mean): --  RR: 18 (15 Nov 2020 15:47) (18 - 18)  SpO2: 98% (15 Nov 2020 15:47) (93% - 98%)  CAPILLARY BLOOD GLUCOSE      POCT Blood Glucose.: 128 mg/dL (15 Nov 2020 17:15)  POCT Blood Glucose.: 215 mg/dL (15 Nov 2020 11:56)  POCT Blood Glucose.: 182 mg/dL (15 Nov 2020 08:04)  POCT Blood Glucose.: 185 mg/dL (14 Nov 2020 21:16)    I&O's Summary    14 Nov 2020 07:01  -  15 Nov 2020 07:00  --------------------------------------------------------  IN: 1040 mL / OUT: 2300 mL / NET: -1260 mL    15 Nov 2020 07:01  -  15 Nov 2020 17:50  --------------------------------------------------------  IN: 600 mL / OUT: 0 mL / NET: 600 mL          PHYSICAL EXAM:   GENERAL: NAD, well-developed  HEAD:  Atraumatic, Normocephalic  EYES: conjunctiva and sclera clear  NECK: Supple  CHEST/LUNG: Clear to auscultation bilaterally; No wheeze  HEART: S1S2 normal. Regular rate and rhythm; No murmurs, rubs, or gallops  ABDOMEN: Soft, obese, Nontender, Nondistended; Bowel sounds present  EXTREMITIES:  1-2+ LE edema  PSYCH/Neuro: AAOx3. Non-focal.   SKIN: Stasis skin changes on LE's. Right foot bandaged.       LABS:                        12.6   8.43  )-----------( 218      ( 15 Nov 2020 10:20 )             40.6     11-15    139  |  100  |  32<H>  ----------------------------<  273<H>  4.6   |  26  |  1.55<H>    Ca    9.7      15 Nov 2020 10:20            RADIOLOGY & ADDITIONAL TESTS:    Imaging Personally Reviewed:  Consultant(s) Notes Reviewed:  Endo  Care Discussed with Consultants/Other Providers:

## 2020-11-15 NOTE — PROGRESS NOTE ADULT - PROBLEM SELECTOR PLAN 1
History of chronic wound and charcot foot with concern for overlying infection due to new erythema and reported drainage  - c/w Unasyn, per ID  - Podiatry consult - debrided at bedside and cultures obtained. Follow up wound cultures - Staph aureus and contaminant.   - DVT US - unable to visualize R common femoral, but otherwise negative  - repeat arterial studies ordered which show mild decrease in flow, appreciate vascular recs   -MRI foot not done as machine unable to accommodate patient, can get CT of the foot with contrast, once cleared by renal (consult placed) - patient however now refuses IV contrast over kidney concerns.   -Consider NM bone scan if ID agrees instead of CT.   -c/w current management  -will try compression stockings

## 2020-11-16 LAB
ANION GAP SERPL CALC-SCNC: 12 MMOL/L — SIGNIFICANT CHANGE UP (ref 5–17)
BUN SERPL-MCNC: 31 MG/DL — HIGH (ref 7–23)
CALCIUM SERPL-MCNC: 9.9 MG/DL — SIGNIFICANT CHANGE UP (ref 8.4–10.5)
CHLORIDE SERPL-SCNC: 101 MMOL/L — SIGNIFICANT CHANGE UP (ref 96–108)
CO2 SERPL-SCNC: 27 MMOL/L — SIGNIFICANT CHANGE UP (ref 22–31)
CREAT SERPL-MCNC: 1.65 MG/DL — HIGH (ref 0.5–1.3)
GLUCOSE BLDC GLUCOMTR-MCNC: 107 MG/DL — HIGH (ref 70–99)
GLUCOSE BLDC GLUCOMTR-MCNC: 111 MG/DL — HIGH (ref 70–99)
GLUCOSE BLDC GLUCOMTR-MCNC: 130 MG/DL — HIGH (ref 70–99)
GLUCOSE BLDC GLUCOMTR-MCNC: 82 MG/DL — SIGNIFICANT CHANGE UP (ref 70–99)
GLUCOSE SERPL-MCNC: 156 MG/DL — HIGH (ref 70–99)
HCT VFR BLD CALC: 43.8 % — SIGNIFICANT CHANGE UP (ref 34.5–45)
HGB BLD-MCNC: 14 G/DL — SIGNIFICANT CHANGE UP (ref 11.5–15.5)
MCHC RBC-ENTMCNC: 29.8 PG — SIGNIFICANT CHANGE UP (ref 27–34)
MCHC RBC-ENTMCNC: 32 GM/DL — SIGNIFICANT CHANGE UP (ref 32–36)
MCV RBC AUTO: 93.2 FL — SIGNIFICANT CHANGE UP (ref 80–100)
NRBC # BLD: 0 /100 WBCS — SIGNIFICANT CHANGE UP (ref 0–0)
PLATELET # BLD AUTO: 237 K/UL — SIGNIFICANT CHANGE UP (ref 150–400)
POTASSIUM SERPL-MCNC: 4.4 MMOL/L — SIGNIFICANT CHANGE UP (ref 3.5–5.3)
POTASSIUM SERPL-SCNC: 4.4 MMOL/L — SIGNIFICANT CHANGE UP (ref 3.5–5.3)
RBC # BLD: 4.7 M/UL — SIGNIFICANT CHANGE UP (ref 3.8–5.2)
RBC # FLD: 13.7 % — SIGNIFICANT CHANGE UP (ref 10.3–14.5)
SODIUM SERPL-SCNC: 140 MMOL/L — SIGNIFICANT CHANGE UP (ref 135–145)
WBC # BLD: 9.87 K/UL — SIGNIFICANT CHANGE UP (ref 3.8–10.5)
WBC # FLD AUTO: 9.87 K/UL — SIGNIFICANT CHANGE UP (ref 3.8–10.5)

## 2020-11-16 PROCEDURE — 99232 SBSQ HOSP IP/OBS MODERATE 35: CPT

## 2020-11-16 PROCEDURE — 93306 TTE W/DOPPLER COMPLETE: CPT | Mod: 26

## 2020-11-16 PROCEDURE — 99233 SBSQ HOSP IP/OBS HIGH 50: CPT

## 2020-11-16 RX ADMIN — Medication 0.5 MILLIGRAM(S): at 21:37

## 2020-11-16 RX ADMIN — INSULIN GLARGINE 80 UNIT(S): 100 INJECTION, SOLUTION SUBCUTANEOUS at 21:37

## 2020-11-16 RX ADMIN — AMPICILLIN SODIUM AND SULBACTAM SODIUM 200 GRAM(S): 250; 125 INJECTION, POWDER, FOR SUSPENSION INTRAMUSCULAR; INTRAVENOUS at 21:38

## 2020-11-16 RX ADMIN — OXYCODONE HYDROCHLORIDE 80 MILLIGRAM(S): 5 TABLET ORAL at 18:16

## 2020-11-16 RX ADMIN — HEPARIN SODIUM 7500 UNIT(S): 5000 INJECTION INTRAVENOUS; SUBCUTANEOUS at 14:05

## 2020-11-16 RX ADMIN — HEPARIN SODIUM 7500 UNIT(S): 5000 INJECTION INTRAVENOUS; SUBCUTANEOUS at 21:38

## 2020-11-16 RX ADMIN — NYSTATIN CREAM 1 APPLICATION(S): 100000 CREAM TOPICAL at 05:09

## 2020-11-16 RX ADMIN — OXYCODONE HYDROCHLORIDE 80 MILLIGRAM(S): 5 TABLET ORAL at 05:09

## 2020-11-16 RX ADMIN — Medication 3 MILLILITER(S): at 05:10

## 2020-11-16 RX ADMIN — Medication 1 APPLICATION(S): at 18:17

## 2020-11-16 RX ADMIN — Medication 22 UNIT(S): at 09:52

## 2020-11-16 RX ADMIN — SENNA PLUS 2 TABLET(S): 8.6 TABLET ORAL at 21:37

## 2020-11-16 RX ADMIN — Medication 22 UNIT(S): at 12:59

## 2020-11-16 RX ADMIN — Medication 81 MILLIGRAM(S): at 13:00

## 2020-11-16 RX ADMIN — OXYCODONE HYDROCHLORIDE 5 MILLIGRAM(S): 5 TABLET ORAL at 12:58

## 2020-11-16 RX ADMIN — ATORVASTATIN CALCIUM 80 MILLIGRAM(S): 80 TABLET, FILM COATED ORAL at 21:37

## 2020-11-16 RX ADMIN — OXYCODONE HYDROCHLORIDE 5 MILLIGRAM(S): 5 TABLET ORAL at 13:28

## 2020-11-16 RX ADMIN — Medication 1 APPLICATION(S): at 13:00

## 2020-11-16 RX ADMIN — Medication 22 UNIT(S): at 18:18

## 2020-11-16 RX ADMIN — HEPARIN SODIUM 7500 UNIT(S): 5000 INJECTION INTRAVENOUS; SUBCUTANEOUS at 05:09

## 2020-11-16 RX ADMIN — INSULIN GLARGINE 80 UNIT(S): 100 INJECTION, SOLUTION SUBCUTANEOUS at 09:51

## 2020-11-16 RX ADMIN — Medication 1 APPLICATION(S): at 05:10

## 2020-11-16 RX ADMIN — Medication 3 MILLILITER(S): at 18:17

## 2020-11-16 RX ADMIN — CHLORHEXIDINE GLUCONATE 1 APPLICATION(S): 213 SOLUTION TOPICAL at 13:01

## 2020-11-16 RX ADMIN — AMPICILLIN SODIUM AND SULBACTAM SODIUM 200 GRAM(S): 250; 125 INJECTION, POWDER, FOR SUSPENSION INTRAMUSCULAR; INTRAVENOUS at 05:09

## 2020-11-16 RX ADMIN — OXYCODONE HYDROCHLORIDE 5 MILLIGRAM(S): 5 TABLET ORAL at 23:18

## 2020-11-16 RX ADMIN — Medication 3 MILLILITER(S): at 13:00

## 2020-11-16 RX ADMIN — AMPICILLIN SODIUM AND SULBACTAM SODIUM 200 GRAM(S): 250; 125 INJECTION, POWDER, FOR SUSPENSION INTRAMUSCULAR; INTRAVENOUS at 14:03

## 2020-11-16 RX ADMIN — Medication 1 TABLET(S): at 13:00

## 2020-11-16 NOTE — PROGRESS NOTE ADULT - ASSESSMENT
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

## 2020-11-16 NOTE — PROGRESS NOTE ADULT - SUBJECTIVE AND OBJECTIVE BOX
Patient is a 65y old  Female who presents with a chief complaint of right foot infection (15 Nov 2020 13:23)       INTERVAL HPI/OVERNIGHT EVENTS:  Patient seen and evaluated at bedside.  Pt is resting comfortable in NAD. Denies N/V/F/C.    Allergies    adhesives (Rash)  latex (Rash)  No Known Drug Allergies  wool- rash, itch (Other)    Intolerances        Vital Signs Last 24 Hrs  T(C): 36.7 (16 Nov 2020 07:26), Max: 36.9 (16 Nov 2020 05:14)  T(F): 98.1 (16 Nov 2020 07:26), Max: 98.5 (16 Nov 2020 05:14)  HR: 77 (16 Nov 2020 07:26) (75 - 84)  BP: 145/74 (16 Nov 2020 07:26) (132/71 - 149/72)  BP(mean): 95 (16 Nov 2020 05:14) (95 - 95)  RR: 18 (16 Nov 2020 07:26) (18 - 18)  SpO2: 98% (16 Nov 2020 07:26) (92% - 100%)    LABS:                        12.6   8.43  )-----------( 218      ( 15 Nov 2020 10:20 )             40.6     11-15    139  |  100  |  32<H>  ----------------------------<  273<H>  4.6   |  26  |  1.55<H>    Ca    9.7      15 Nov 2020 10:20          CAPILLARY BLOOD GLUCOSE      POCT Blood Glucose.: 130 mg/dL (16 Nov 2020 09:42)  POCT Blood Glucose.: 155 mg/dL (15 Nov 2020 20:54)  POCT Blood Glucose.: 128 mg/dL (15 Nov 2020 17:15)  POCT Blood Glucose.: 215 mg/dL (15 Nov 2020 11:56)      Lower Extremity Physical Exam:  Vascular: DP/PT 1/4 B/L, CFT <3 seconds B/L, Temperature gradient warm to cool B/L  Neuro: Epicritic sensation diminished to the level of midfoot B/L  Musculoskeletal/Ortho: charcot foot deformity R, non-ambi  Skin: Right plantar midfoot wound to subq, no purulence, no fluctuance, no malodor, hyperkeratotic borders, improved erythema of R dorsal and plantar forefoot ; now with L foot showing signs of erythema noted to forefoot, L forefoot warm to touch, no open wounds to L foot    RADIOLOGY & ADDITIONAL TESTS:

## 2020-11-16 NOTE — PROGRESS NOTE ADULT - SUBJECTIVE AND OBJECTIVE BOX
Patient is a 65y old  Female who presents with a chief complaint of right foot infection (15 Nov 2020 13:23)    SUBJECTIVE / OVERNIGHT EVENTS: no acute events overnight, patient states that she was concerned about the CT scan affecting her kidneys and decided against it. No other complaints.      MEDICATIONS  (STANDING):  albuterol/ipratropium for Nebulization. 3 milliLiter(s) Nebulizer every 6 hours  ampicillin/sulbactam  IVPB 3 Gram(s) IV Intermittent every 8 hours  AQUAPHOR (petrolatum Ointment) 1 Application(s) Topical two times a day  aspirin enteric coated 81 milliGRAM(s) Oral daily  atorvastatin 80 milliGRAM(s) Oral at bedtime  chlorhexidine 2% Cloths 1 Application(s) Topical daily  collagenase Ointment 1 Application(s) Topical daily  dextrose 5%. 1000 milliLiter(s) (50 mL/Hr) IV Continuous <Continuous>  dextrose 50% Injectable 12.5 Gram(s) IV Push once  dextrose 50% Injectable 25 Gram(s) IV Push once  dextrose 50% Injectable 25 Gram(s) IV Push once  heparin   Injectable 7500 Unit(s) SubCutaneous every 8 hours  insulin glargine Injectable (LANTUS) 80 Unit(s) SubCutaneous every morning  insulin glargine Injectable (LANTUS) 80 Unit(s) SubCutaneous at bedtime  insulin lispro (ADMELOG) corrective regimen sliding scale   SubCutaneous at bedtime  insulin lispro (ADMELOG) corrective regimen sliding scale   SubCutaneous three times a day before meals  insulin lispro Injectable (ADMELOG) 22 Unit(s) SubCutaneous three times a day before meals  multivitamin 1 Tablet(s) Oral daily  nystatin Powder 1 Application(s) Topical two times a day  oxyCODONE  ER Tablet 80 milliGRAM(s) Oral every 12 hours  senna 2 Tablet(s) Oral at bedtime    MEDICATIONS  (PRN):  clonazePAM  Tablet 0.5 milliGRAM(s) Oral at bedtime PRN ancxiety  dextrose 40% Gel 15 Gram(s) Oral once PRN Blood Glucose LESS THAN 70 milliGRAM(s)/deciliter  glucagon  Injectable 1 milliGRAM(s) IntraMuscular once PRN Glucose LESS THAN 70 milligrams/deciliter  oxyCODONE    IR 5 milliGRAM(s) Oral every 8 hours PRN Breakthrough Pain      Vital Signs Last 24 Hrs  T(C): 36.7 (16 Nov 2020 07:26), Max: 36.9 (16 Nov 2020 05:14)  T(F): 98.1 (16 Nov 2020 07:26), Max: 98.5 (16 Nov 2020 05:14)  HR: 77 (16 Nov 2020 07:26) (75 - 84)  BP: 145/74 (16 Nov 2020 07:26) (132/71 - 149/72)  BP(mean): 95 (16 Nov 2020 05:14) (95 - 95)  RR: 18 (16 Nov 2020 07:26) (18 - 18)  SpO2: 98% (16 Nov 2020 07:26) (92% - 100%)  CAPILLARY BLOOD GLUCOSE      POCT Blood Glucose.: 111 mg/dL (16 Nov 2020 12:54)  POCT Blood Glucose.: 130 mg/dL (16 Nov 2020 09:42)  POCT Blood Glucose.: 155 mg/dL (15 Nov 2020 20:54)  POCT Blood Glucose.: 128 mg/dL (15 Nov 2020 17:15)    I&O's Summary    15 Nov 2020 07:01  -  16 Nov 2020 07:00  --------------------------------------------------------  IN: 1600 mL / OUT: 1450 mL / NET: 150 mL    16 Nov 2020 07:01  -  16 Nov 2020 13:32  --------------------------------------------------------  IN: 500 mL / OUT: 800 mL / NET: -300 mL      PHYSICAL EXAM:  GENERAL: NAD  EYES: conjunctiva and sclera clear  CHEST/LUNG: Clear to auscultation bilaterally; No wheeze  HEART: +S1/S2, reg   ABDOMEN: Soft, Nontender, Nondistended  EXTREMITIES:  b/l LE edema, R foot dressing intact   PSYCH: AAOx3      LABS:                        12.6   8.43  )-----------( 218      ( 15 Nov 2020 10:20 )             40.6     11-15    139  |  100  |  32<H>  ----------------------------<  273<H>  4.6   |  26  |  1.55<H>    Ca    9.7      15 Nov 2020 10:20

## 2020-11-16 NOTE — PROGRESS NOTE ADULT - ASSESSMENT
64 yo F pt w/ R foot wound  - pt seen and evaluated  - Afebrile, no leukocytosis  - Right plantar midfoot wound to subq, no purulence, no fluctuance, no malodor, resolved erythema of R dorsal and plantar forefoot; L foot showing improved erythema noted to forefoot, no open wounds to L foot  - pt unable to get MR due to size, unable to get CT scan w/ & w/o contrast 2/2 creatinine levels  - pod stable for discharge, f/u information in discharge provider note under follow up   - D/c on PO abx per ID recommendations, appreciated  - d/w attending

## 2020-11-16 NOTE — PROGRESS NOTE ADULT - PROBLEM SELECTOR PLAN 1
History of chronic wound and charcot foot with concern for overlying infection due to new erythema and reported drainage  - c/w Unasyn, per ID  - Podiatry consult - debrided at bedside and cultures obtained. Follow up wound cultures - Staph aureus and contaminant.   - DVT US - unable to visualize R common femoral, but otherwise negative  - repeat arterial studies ordered which show mild decrease in flow, appreciate vascular recs   -MRI foot not done as machine unable to accommodate patient, can get CT of the foot with contrast, once cleared by renal (consult placed) - patient however now refuses IV contrast over kidney concerns.   -Consider NM bone scan if ID agrees instead of CT. Pt requesting anesthesia for procedure, unable to lie still and anxious. Will discuss with ID.    -c/w current management

## 2020-11-16 NOTE — PROGRESS NOTE ADULT - ASSESSMENT
65 f with  morbid obesity, DM, HTN, asthma, CHF, pulmonary HTN, CKD, diabetic neuropathy, Charcot foot, chronic venous statis and chronic right foot wound, now with R foot erythema   afebrile, WBC normal  s/p debridement by podiatry and cx polymicrobial with anaerobes and also staph aureus, pt had MSSA and finegolida 7/2020  was on vanco and zosyn here with elevated vanco level and increased Cr    diabetic foot infection and cellulitis, r/o osteo  JACQUES on CKD    * wound cx with MSSA and polymicrobial with >4 organisms  *  pt dis not fit in the MRI and CT with contrast was not done in view of renal function, now plan for nuclear scan  * c/w  unasyn 3 q 8, antibiotics started 11/9, now day 8  * if no concern for osteo on nuclear scan then will only treat for soft tissue infection  * f/u with podiatry     The above assessment and plan was discussed with the primary team    Steffany Lockhart MD  Pager [phone]  After 5pm and on weekends call [phone]

## 2020-11-16 NOTE — PROGRESS NOTE ADULT - SUBJECTIVE AND OBJECTIVE BOX
Chief Complaint/Follow-up on:     Subjective:    MEDICATIONS  (STANDING):  albuterol/ipratropium for Nebulization. 3 milliLiter(s) Nebulizer every 6 hours  ampicillin/sulbactam  IVPB 3 Gram(s) IV Intermittent every 8 hours  AQUAPHOR (petrolatum Ointment) 1 Application(s) Topical two times a day  aspirin enteric coated 81 milliGRAM(s) Oral daily  atorvastatin 80 milliGRAM(s) Oral at bedtime  chlorhexidine 2% Cloths 1 Application(s) Topical daily  collagenase Ointment 1 Application(s) Topical daily  dextrose 5%. 1000 milliLiter(s) (50 mL/Hr) IV Continuous <Continuous>  dextrose 50% Injectable 12.5 Gram(s) IV Push once  dextrose 50% Injectable 25 Gram(s) IV Push once  dextrose 50% Injectable 25 Gram(s) IV Push once  heparin   Injectable 7500 Unit(s) SubCutaneous every 8 hours  insulin glargine Injectable (LANTUS) 80 Unit(s) SubCutaneous every morning  insulin glargine Injectable (LANTUS) 80 Unit(s) SubCutaneous at bedtime  insulin lispro (ADMELOG) corrective regimen sliding scale   SubCutaneous at bedtime  insulin lispro (ADMELOG) corrective regimen sliding scale   SubCutaneous three times a day before meals  insulin lispro Injectable (ADMELOG) 22 Unit(s) SubCutaneous three times a day before meals  multivitamin 1 Tablet(s) Oral daily  nystatin Powder 1 Application(s) Topical two times a day  oxyCODONE  ER Tablet 80 milliGRAM(s) Oral every 12 hours  senna 2 Tablet(s) Oral at bedtime    MEDICATIONS  (PRN):  clonazePAM  Tablet 0.5 milliGRAM(s) Oral at bedtime PRN ancxiety  dextrose 40% Gel 15 Gram(s) Oral once PRN Blood Glucose LESS THAN 70 milliGRAM(s)/deciliter  glucagon  Injectable 1 milliGRAM(s) IntraMuscular once PRN Glucose LESS THAN 70 milligrams/deciliter  oxyCODONE    IR 5 milliGRAM(s) Oral every 8 hours PRN Breakthrough Pain      PHYSICAL EXAM:  VITALS: T(C): 36.4 (11-16-20 @ 16:02)  T(F): 97.6 (11-16-20 @ 16:02), Max: 98.5 (11-16-20 @ 05:14)  HR: 86 (11-16-20 @ 16:02) (75 - 86)  BP: 142/72 (11-16-20 @ 16:02) (132/71 - 149/68)  RR:  (18 - 18)  SpO2:  (92% - 100%)  Wt(kg): --  GENERAL: NAD, well-groomed, well-developed  EYES: No proptosis, no injection  HEENT:  Atraumatic, Normocephalic, moist mucous membranes  THYROID: Normal size, no palpable nodules  RESPIRATORY: Clear to auscultation bilaterally; No rales, rhonchi, wheezing, or rubs  CARDIOVASCULAR: Regular rate and rhythm; No murmurs; no peripheral edema  GI: Soft, nontender, non distended, normal bowel sounds  CUSHING'S SIGNS: no striae    POCT Blood Glucose.: 111 mg/dL (11-16-20 @ 12:54)  POCT Blood Glucose.: 130 mg/dL (11-16-20 @ 09:42)  POCT Blood Glucose.: 155 mg/dL (11-15-20 @ 20:54)  POCT Blood Glucose.: 128 mg/dL (11-15-20 @ 17:15)  POCT Blood Glucose.: 215 mg/dL (11-15-20 @ 11:56)  POCT Blood Glucose.: 182 mg/dL (11-15-20 @ 08:04)  POCT Blood Glucose.: 185 mg/dL (11-14-20 @ 21:16)  POCT Blood Glucose.: 190 mg/dL (11-14-20 @ 17:05)  POCT Blood Glucose.: 225 mg/dL (11-14-20 @ 12:21)  POCT Blood Glucose.: 176 mg/dL (11-14-20 @ 08:12)  POCT Blood Glucose.: 173 mg/dL (11-13-20 @ 21:43)  POCT Blood Glucose.: 187 mg/dL (11-13-20 @ 17:23)    11-16    140  |  101  |  31<H>  ----------------------------<  156<H>  4.4   |  27  |  1.65<H>    EGFR if : 37<L>  EGFR if non : 32<L>    Ca    9.9      11-16            Thyroid Function Tests:                       Chief Complaint/Follow-up on: T2D    Subjective:     MEDICATIONS  (STANDING):  albuterol/ipratropium for Nebulization. 3 milliLiter(s) Nebulizer every 6 hours  ampicillin/sulbactam  IVPB 3 Gram(s) IV Intermittent every 8 hours  AQUAPHOR (petrolatum Ointment) 1 Application(s) Topical two times a day  aspirin enteric coated 81 milliGRAM(s) Oral daily  atorvastatin 80 milliGRAM(s) Oral at bedtime  chlorhexidine 2% Cloths 1 Application(s) Topical daily  collagenase Ointment 1 Application(s) Topical daily  dextrose 5%. 1000 milliLiter(s) (50 mL/Hr) IV Continuous <Continuous>  dextrose 50% Injectable 12.5 Gram(s) IV Push once  dextrose 50% Injectable 25 Gram(s) IV Push once  dextrose 50% Injectable 25 Gram(s) IV Push once  heparin   Injectable 7500 Unit(s) SubCutaneous every 8 hours  insulin glargine Injectable (LANTUS) 80 Unit(s) SubCutaneous every morning  insulin glargine Injectable (LANTUS) 80 Unit(s) SubCutaneous at bedtime  insulin lispro (ADMELOG) corrective regimen sliding scale   SubCutaneous at bedtime  insulin lispro (ADMELOG) corrective regimen sliding scale   SubCutaneous three times a day before meals  insulin lispro Injectable (ADMELOG) 22 Unit(s) SubCutaneous three times a day before meals  multivitamin 1 Tablet(s) Oral daily  nystatin Powder 1 Application(s) Topical two times a day  oxyCODONE  ER Tablet 80 milliGRAM(s) Oral every 12 hours  senna 2 Tablet(s) Oral at bedtime    MEDICATIONS  (PRN):  clonazePAM  Tablet 0.5 milliGRAM(s) Oral at bedtime PRN ancxiety  dextrose 40% Gel 15 Gram(s) Oral once PRN Blood Glucose LESS THAN 70 milliGRAM(s)/deciliter  glucagon  Injectable 1 milliGRAM(s) IntraMuscular once PRN Glucose LESS THAN 70 milligrams/deciliter  oxyCODONE    IR 5 milliGRAM(s) Oral every 8 hours PRN Breakthrough Pain      PHYSICAL EXAM:  VITALS: T(C): 36.4 (11-16-20 @ 16:02)  T(F): 97.6 (11-16-20 @ 16:02), Max: 98.5 (11-16-20 @ 05:14)  HR: 86 (11-16-20 @ 16:02) (75 - 86)  BP: 142/72 (11-16-20 @ 16:02) (132/71 - 149/68)  RR:  (18 - 18)  SpO2:  (92% - 100%)  Wt(kg): --  GENERAL: NAD, well-groomed, well-developed  EYES: No proptosis, no injection  RESPIRATORY: Clear to auscultation bilaterally; No rales, rhonchi, wheezing, or rubs  CARDIOVASCULAR: Regular rate and rhythm; No murmurs; no peripheral edema  GI: Soft, nontender, non distended, normal bowel sounds      POCT Blood Glucose.: 111 mg/dL (11-16-20 @ 12:54)  POCT Blood Glucose.: 130 mg/dL (11-16-20 @ 09:42)  POCT Blood Glucose.: 155 mg/dL (11-15-20 @ 20:54)  POCT Blood Glucose.: 128 mg/dL (11-15-20 @ 17:15)  POCT Blood Glucose.: 215 mg/dL (11-15-20 @ 11:56)  POCT Blood Glucose.: 182 mg/dL (11-15-20 @ 08:04)  POCT Blood Glucose.: 185 mg/dL (11-14-20 @ 21:16)  POCT Blood Glucose.: 190 mg/dL (11-14-20 @ 17:05)  POCT Blood Glucose.: 225 mg/dL (11-14-20 @ 12:21)  POCT Blood Glucose.: 176 mg/dL (11-14-20 @ 08:12)  POCT Blood Glucose.: 173 mg/dL (11-13-20 @ 21:43)  POCT Blood Glucose.: 187 mg/dL (11-13-20 @ 17:23)    11-16    140  |  101  |  31<H>  ----------------------------<  156<H>  4.4   |  27  |  1.65<H>    EGFR if : 37<L>  EGFR if non : 32<L>    Ca    9.9      11-16

## 2020-11-16 NOTE — PROGRESS NOTE ADULT - PROBLEM SELECTOR PLAN 1
Continue Lantus 80 units sq BID  Continue Humalog to 22 units qac  Mod correction scale qac + bedtime  Goal glucose 100-180  Outpt. endo follow-up  Outpt. optho, podiatry, nephrology  DISPO: reached out to her PCP and asked her to consider switching the patient to Toujeo or Tresiba which are more concentrated or long-acting respectively. Also asked about CGM use.  Basal/bolus, doses TBD.

## 2020-11-16 NOTE — PROGRESS NOTE ADULT - SUBJECTIVE AND OBJECTIVE BOX
Follow Up:  diabetic foot infection and cellulitis    Interval History: CT with contrast was not done as pt was concerned about the renal function, now plan for nuclear scan    ROS:      All other systems negative    Constitutional: no fever, no chills  Cardiovascular:  no chest pain, no palpitation  Respiratory:  no SOB, no cough  GI:  no abd pain, no vomiting, no diarrhea  urinary: no dysuria, no hematuria, no flank pain  musculoskeletal: slight improvement in R foot redness and pain,  some pain and erythema at the L lateral leg  skin:  no rash, R foot wound and erythema   neurology:  no headache, no seizure          Allergies  adhesives (Rash)  latex (Rash)  No Known Drug Allergies  wool- rash, itch (Other)        ANTIMICROBIALS:  ampicillin/sulbactam  IVPB 3 every 8 hours      OTHER MEDS:  albuterol/ipratropium for Nebulization. 3 milliLiter(s) Nebulizer every 6 hours  AQUAPHOR (petrolatum Ointment) 1 Application(s) Topical two times a day  aspirin enteric coated 81 milliGRAM(s) Oral daily  atorvastatin 80 milliGRAM(s) Oral at bedtime  chlorhexidine 2% Cloths 1 Application(s) Topical daily  clonazePAM  Tablet 0.5 milliGRAM(s) Oral at bedtime PRN  collagenase Ointment 1 Application(s) Topical daily  dextrose 40% Gel 15 Gram(s) Oral once PRN  dextrose 5%. 1000 milliLiter(s) IV Continuous <Continuous>  dextrose 50% Injectable 12.5 Gram(s) IV Push once  dextrose 50% Injectable 25 Gram(s) IV Push once  dextrose 50% Injectable 25 Gram(s) IV Push once  glucagon  Injectable 1 milliGRAM(s) IntraMuscular once PRN  heparin   Injectable 7500 Unit(s) SubCutaneous every 8 hours  insulin glargine Injectable (LANTUS) 80 Unit(s) SubCutaneous every morning  insulin glargine Injectable (LANTUS) 80 Unit(s) SubCutaneous at bedtime  insulin lispro (ADMELOG) corrective regimen sliding scale   SubCutaneous at bedtime  insulin lispro (ADMELOG) corrective regimen sliding scale   SubCutaneous three times a day before meals  insulin lispro Injectable (ADMELOG) 22 Unit(s) SubCutaneous three times a day before meals  multivitamin 1 Tablet(s) Oral daily  nystatin Powder 1 Application(s) Topical two times a day  oxyCODONE    IR 5 milliGRAM(s) Oral every 8 hours PRN  oxyCODONE  ER Tablet 80 milliGRAM(s) Oral every 12 hours  senna 2 Tablet(s) Oral at bedtime      Vital Signs Last 24 Hrs  T(C): 36.4 (16 Nov 2020 16:02), Max: 36.9 (16 Nov 2020 05:14)  T(F): 97.6 (16 Nov 2020 16:02), Max: 98.5 (16 Nov 2020 05:14)  HR: 86 (16 Nov 2020 16:02) (75 - 86)  BP: 142/72 (16 Nov 2020 16:02) (132/71 - 149/68)  BP(mean): 95 (16 Nov 2020 05:14) (95 - 95)  RR: 18 (16 Nov 2020 16:02) (18 - 18)  SpO2: 94% (16 Nov 2020 16:02) (92% - 100%)    Physical Exam:  General:    NAD,  non toxic, sitting on a chair  Cardio:     regular S1, S2,  no murmur  Respiratory:    clear b/l,    no wheezing  abd:     soft,   BS +,   no tenderness  :   no CVAT,  no suprapubic tenderness,   no  de la torre  Musculoskeletal: R plantar foot wound with improved erythema on the dorsal and plantar surface   L lateral LE slightly erythematous on the chronic lesion, it is blanchable and slightly tender  vascular: no phlebitis  Skin:    no rash                          14.0   9.87  )-----------( 237      ( 16 Nov 2020 14:33 )             43.8       11-16    140  |  101  |  31<H>  ----------------------------<  156<H>  4.4   |  27  |  1.65<H>    Ca    9.9      16 Nov 2020 14:33            MICROBIOLOGY:  v  .Abscess right foot wound  11-10-20   Culture yields >4 types of aerobic and/or anaerobic bacteria  Call client services within 7 days if further workup is clinically  indicated. Culture includes  Moderate Staphylococcus aureus  --  Staphylococcus aureus      .Blood Blood-Peripheral  11-10-20   No Growth Final  --  --                RADIOLOGY:  Images independently visualized and reviewed personally, findings as below  < from: Xray Chest 1 View- PORTABLE-Urgent (Xray Chest 1 View- PORTABLE-Urgent .) (11.13.20 @ 16:44) >  FINDINGS: The heart size cannot be adequately assessed on this single view. There are no focal consolidations or pleural effusions. The hilar mediastinal structures appear unremarkable.    IMPRESSION: Clear lungs.        < end of copied text >  < from: Xray Foot AP + Lateral + Oblique, Right (11.09.20 @ 17:43) >  IMPRESSION:  1. No soft tissue gas or acute osseous destruction is appreciated.  2. Charcot changes of the midfoot are again seen.  3. MRI may be helpful for further evaluation of osteomyelitis as warranted.      < end of copied text >

## 2020-11-17 LAB
GLUCOSE BLDC GLUCOMTR-MCNC: 110 MG/DL — HIGH (ref 70–99)
GLUCOSE BLDC GLUCOMTR-MCNC: 117 MG/DL — HIGH (ref 70–99)
GLUCOSE BLDC GLUCOMTR-MCNC: 135 MG/DL — HIGH (ref 70–99)
GLUCOSE BLDC GLUCOMTR-MCNC: 92 MG/DL — SIGNIFICANT CHANGE UP (ref 70–99)

## 2020-11-17 PROCEDURE — 78800 RP LOCLZJ TUM 1 AREA 1 D IMG: CPT | Mod: 26

## 2020-11-17 PROCEDURE — 78102 BONE MARROW IMAGING LTD: CPT | Mod: 26

## 2020-11-17 PROCEDURE — 99233 SBSQ HOSP IP/OBS HIGH 50: CPT

## 2020-11-17 PROCEDURE — 99232 SBSQ HOSP IP/OBS MODERATE 35: CPT

## 2020-11-17 RX ORDER — OXYCODONE HYDROCHLORIDE 5 MG/1
80 TABLET ORAL EVERY 12 HOURS
Refills: 0 | Status: DISCONTINUED | OUTPATIENT
Start: 2020-11-18 | End: 2020-11-25

## 2020-11-17 RX ORDER — ONDANSETRON 8 MG/1
4 TABLET, FILM COATED ORAL ONCE
Refills: 0 | Status: DISCONTINUED | OUTPATIENT
Start: 2020-11-17 | End: 2020-11-17

## 2020-11-17 RX ORDER — OXYCODONE HYDROCHLORIDE 5 MG/1
5 TABLET ORAL EVERY 8 HOURS
Refills: 0 | Status: DISCONTINUED | OUTPATIENT
Start: 2020-11-18 | End: 2020-11-25

## 2020-11-17 RX ORDER — CLONAZEPAM 1 MG
0.5 TABLET ORAL AT BEDTIME
Refills: 0 | Status: DISCONTINUED | OUTPATIENT
Start: 2020-11-17 | End: 2020-11-24

## 2020-11-17 RX ADMIN — Medication 1 APPLICATION(S): at 05:16

## 2020-11-17 RX ADMIN — HEPARIN SODIUM 7500 UNIT(S): 5000 INJECTION INTRAVENOUS; SUBCUTANEOUS at 14:46

## 2020-11-17 RX ADMIN — INSULIN GLARGINE 80 UNIT(S): 100 INJECTION, SOLUTION SUBCUTANEOUS at 21:20

## 2020-11-17 RX ADMIN — AMPICILLIN SODIUM AND SULBACTAM SODIUM 200 GRAM(S): 250; 125 INJECTION, POWDER, FOR SUSPENSION INTRAMUSCULAR; INTRAVENOUS at 15:39

## 2020-11-17 RX ADMIN — OXYCODONE HYDROCHLORIDE 80 MILLIGRAM(S): 5 TABLET ORAL at 17:40

## 2020-11-17 RX ADMIN — Medication 1 APPLICATION(S): at 15:40

## 2020-11-17 RX ADMIN — ATORVASTATIN CALCIUM 80 MILLIGRAM(S): 80 TABLET, FILM COATED ORAL at 21:15

## 2020-11-17 RX ADMIN — HEPARIN SODIUM 7500 UNIT(S): 5000 INJECTION INTRAVENOUS; SUBCUTANEOUS at 05:11

## 2020-11-17 RX ADMIN — OXYCODONE HYDROCHLORIDE 5 MILLIGRAM(S): 5 TABLET ORAL at 00:00

## 2020-11-17 RX ADMIN — SENNA PLUS 2 TABLET(S): 8.6 TABLET ORAL at 21:17

## 2020-11-17 RX ADMIN — OXYCODONE HYDROCHLORIDE 5 MILLIGRAM(S): 5 TABLET ORAL at 15:40

## 2020-11-17 RX ADMIN — OXYCODONE HYDROCHLORIDE 80 MILLIGRAM(S): 5 TABLET ORAL at 18:10

## 2020-11-17 RX ADMIN — AMPICILLIN SODIUM AND SULBACTAM SODIUM 200 GRAM(S): 250; 125 INJECTION, POWDER, FOR SUSPENSION INTRAMUSCULAR; INTRAVENOUS at 21:16

## 2020-11-17 RX ADMIN — Medication 1 APPLICATION(S): at 17:39

## 2020-11-17 RX ADMIN — HEPARIN SODIUM 7500 UNIT(S): 5000 INJECTION INTRAVENOUS; SUBCUTANEOUS at 21:16

## 2020-11-17 RX ADMIN — OXYCODONE HYDROCHLORIDE 5 MILLIGRAM(S): 5 TABLET ORAL at 21:58

## 2020-11-17 RX ADMIN — OXYCODONE HYDROCHLORIDE 80 MILLIGRAM(S): 5 TABLET ORAL at 05:11

## 2020-11-17 RX ADMIN — Medication 3 MILLILITER(S): at 17:36

## 2020-11-17 RX ADMIN — OXYCODONE HYDROCHLORIDE 5 MILLIGRAM(S): 5 TABLET ORAL at 16:10

## 2020-11-17 RX ADMIN — NYSTATIN CREAM 1 APPLICATION(S): 100000 CREAM TOPICAL at 05:16

## 2020-11-17 RX ADMIN — Medication 22 UNIT(S): at 17:35

## 2020-11-17 RX ADMIN — OXYCODONE HYDROCHLORIDE 5 MILLIGRAM(S): 5 TABLET ORAL at 21:20

## 2020-11-17 RX ADMIN — AMPICILLIN SODIUM AND SULBACTAM SODIUM 200 GRAM(S): 250; 125 INJECTION, POWDER, FOR SUSPENSION INTRAMUSCULAR; INTRAVENOUS at 05:11

## 2020-11-17 NOTE — PRE-ANESTHESIA EVALUATION ADULT - NSANTHADDINFOFT_GEN_ALL_CORE
d/w Dr Meléndez hospitalist/primary team. The risk of anesthetic complications is high due to patient's multiple comorbidities.  Per Dr Meléndez ok to try with light sedation or defer the study as primary team may discuss with pt another study not requiring anesthesia. Discussed with pt. Pt would like to proceed with the nuclear study with light sedation. Pt understands she will be awake and have recall. Risks and benefits discussed with the patient. d/w Dr Meléndez hospitalist/primary team. The risk of anesthetic complications is high due to patient's multiple comorbidities.  Per Dr Meléndez ok to try with light sedation or defer the study at this time. Discussed with pt. Pt would like to proceed with the nuclear study with light sedation. Pt understands she will be awake and have recall. Risks and benefits discussed with the patient.

## 2020-11-17 NOTE — PROGRESS NOTE ADULT - ASSESSMENT
65 f with  morbid obesity, DM, HTN, asthma, CHF, pulmonary HTN, CKD, diabetic neuropathy, Charcot foot, chronic venous statis and chronic right foot wound, now with R foot erythema   afebrile, WBC normal  s/p debridement by podiatry and cx polymicrobial with anaerobes and also staph aureus, pt had MSSA and finegolida 7/2020  was on vanco and zosyn here with elevated vanco level and increased Cr    diabetic foot infection and cellulitis, r/o osteo  JACQUES on CKD    * wound cx with MSSA and polymicrobial with >4 organisms  *  pt dis not fit in the MRI and CT with contrast was not done in view of renal function, f/u the  nuclear scan  * c/w  unasyn 3 q 8, antibiotics started 11/9, now day 9  * if no concern for osteo on nuclear scan then will only treat for soft tissue infection  * f/u with podiatry     The above assessment and plan was discussed with the primary team    Steffany Lockhart MD  Pager [phone]  After 5pm and on weekends call [phone]

## 2020-11-17 NOTE — PRE-ANESTHESIA EVALUATION ADULT - NSPREOPDXFT_GEN_ALL_CORE
foot infection rapid assessment: coarse wheezing inc. work of breathing. tachypnea. belly breathing. wet cough. Beronica Cortés MS, RN, CPNP-PC Improved , tolerated po well. Remains happy and comfortable. Parents feel comfortable   going home, understands return precautions.

## 2020-11-17 NOTE — PROGRESS NOTE ADULT - ASSESSMENT
64 yo F pt w/ R foot wound, r/o OM  - WBC labeled bone scan + OM, right midfoot  - Discussed with patient need for bone biopsy to both confirm presence of OM and determine sensitivities. patient declining at this time stating she would like to speak with her family first  - WCx + MSSA  - Santyl and DSD applied   - please page podiatry ([phone]) if patient amenable to Bbx/BCx  - D/c on abx per ID recommendations, appreciated  - Cont daily wound care with santyl and DSD daily

## 2020-11-17 NOTE — PROGRESS NOTE ADULT - PROBLEM SELECTOR PLAN 1
History of chronic wound and charcot foot with concern for overlying infection due to new erythema and reported drainage  - c/w Unasyn, per ID  - Podiatry consult - debrided at bedside and cultures obtained. Follow up wound cultures - Staph aureus and contaminant.   - DVT US - unable to visualize R common femoral, but otherwise negative  - repeat arterial studies ordered which show mild decrease in flow, appreciate vascular recs   -MRI foot not done as machine unable to accommodate patient, can get CT of the foot with contrast, once cleared by renal (consult placed) - patient however now refuses IV contrast over kidney concerns.   -NM bone scan done, f/u results   -long term abx decision pending bone scan results

## 2020-11-17 NOTE — PROGRESS NOTE ADULT - SUBJECTIVE AND OBJECTIVE BOX
Follow Up:  diabetic foot infection and cellulitis    Interval History: pt afebrile, going for nuclear test today    ROS:      All other systems negative    Constitutional: no fever, no chills  Cardiovascular:  no chest pain, no palpitation  Respiratory:  no SOB, no cough  GI:  no abd pain, no vomiting, no diarrhea  urinary: no dysuria, no hematuria, no flank pain  musculoskeletal: slight improvement in R foot redness and pain,  some pain and erythema at the L lateral leg  skin:  no rash, R foot wound and erythema   neurology:  no headache, no seizure      Allergies  adhesives (Rash)  Bactrim (Flushing)  latex (Rash)  wool- rash, itch (Other)        ANTIMICROBIALS:  ampicillin/sulbactam  IVPB 3 every 8 hours      OTHER MEDS:  albuterol/ipratropium for Nebulization. 3 milliLiter(s) Nebulizer every 6 hours  AQUAPHOR (petrolatum Ointment) 1 Application(s) Topical two times a day  aspirin enteric coated 81 milliGRAM(s) Oral daily  atorvastatin 80 milliGRAM(s) Oral at bedtime  chlorhexidine 2% Cloths 1 Application(s) Topical daily  clonazePAM  Tablet 0.5 milliGRAM(s) Oral at bedtime PRN  collagenase Ointment 1 Application(s) Topical daily  dextrose 40% Gel 15 Gram(s) Oral once PRN  dextrose 5%. 1000 milliLiter(s) IV Continuous <Continuous>  dextrose 50% Injectable 12.5 Gram(s) IV Push once  dextrose 50% Injectable 25 Gram(s) IV Push once  dextrose 50% Injectable 25 Gram(s) IV Push once  glucagon  Injectable 1 milliGRAM(s) IntraMuscular once PRN  heparin   Injectable 7500 Unit(s) SubCutaneous every 8 hours  insulin glargine Injectable (LANTUS) 80 Unit(s) SubCutaneous every morning  insulin glargine Injectable (LANTUS) 80 Unit(s) SubCutaneous at bedtime  insulin lispro (ADMELOG) corrective regimen sliding scale   SubCutaneous at bedtime  insulin lispro (ADMELOG) corrective regimen sliding scale   SubCutaneous three times a day before meals  insulin lispro Injectable (ADMELOG) 22 Unit(s) SubCutaneous three times a day before meals  multivitamin 1 Tablet(s) Oral daily  nystatin Powder 1 Application(s) Topical two times a day  ondansetron Injectable 4 milliGRAM(s) IV Push once PRN  oxyCODONE    IR 5 milliGRAM(s) Oral every 8 hours PRN  oxyCODONE  ER Tablet 80 milliGRAM(s) Oral every 12 hours  senna 2 Tablet(s) Oral at bedtime      Vital Signs Last 24 Hrs  T(C): 36 (17 Nov 2020 12:25), Max: 36.6 (17 Nov 2020 00:01)  T(F): 96.8 (17 Nov 2020 12:25), Max: 97.9 (17 Nov 2020 07:37)  HR: 67 (17 Nov 2020 13:00) (66 - 86)  BP: 131/60 (17 Nov 2020 13:00) (126/69 - 157/70)  BP(mean): 87 (17 Nov 2020 13:00) (85 - 88)  RR: 20 (17 Nov 2020 13:00) (18 - 22)  SpO2: 99% (17 Nov 2020 13:00) (92% - 100%)    Physical Exam:  General:    NAD,  non toxic  Cardio:     regular S1, S2,  no murmur  Respiratory:    clear b/l,    no wheezing  abd:     soft,   BS +,   no tenderness  :   no CVAT,  no suprapubic tenderness,   no  de la torre  Musculoskeletal: R plantar foot wound with improved erythema on the dorsal and plantar surface   L lateral LE slightly erythematous on the chronic lesion, it is blanchable and slightly tender  vascular: no phlebitis  Skin:    no rash                          14.0   9.87  )-----------( 237      ( 16 Nov 2020 14:33 )             43.8       11-16    140  |  101  |  31<H>  ----------------------------<  156<H>  4.4   |  27  |  1.65<H>    Ca    9.9      16 Nov 2020 14:33            MICROBIOLOGY:  v  .Abscess right foot wound  11-10-20   Culture yields >4 types of aerobic and/or anaerobic bacteria  Call client services within 7 days if further workup is clinically  indicated. Culture includes  Moderate Staphylococcus aureus  --  Staphylococcus aureus      .Blood Blood-Peripheral  11-10-20   No Growth Final  --  --                RADIOLOGY:  Images independently visualized and reviewed personally, findings as below  < from: Xray Chest 1 View- PORTABLE-Urgent (Xray Chest 1 View- PORTABLE-Urgent .) (11.13.20 @ 16:44) >  IMPRESSION: Clear lungs.      < end of copied text >  < from: Xray Foot AP + Lateral + Oblique, Right (11.09.20 @ 17:43) >  IMPRESSION:  1. No soft tissue gas or acute osseous destruction is appreciated.  2. Charcot changes of the midfoot are again seen.  3. MRI may be helpful for further evaluation of osteomyelitis as warranted.

## 2020-11-17 NOTE — PROGRESS NOTE ADULT - SUBJECTIVE AND OBJECTIVE BOX
Patient is a 65y old  Female who presents with a chief complaint of right foot infection (15 Nov 2020 13:23)    SUBJECTIVE / OVERNIGHT EVENTS: patient underwent nuclear medicine bone scan today, currently in PACU for recovery from sedation. Patient reporting that scan was painful but she managed to get through it.     MEDICATIONS  (STANDING):  albuterol/ipratropium for Nebulization. 3 milliLiter(s) Nebulizer every 6 hours  ampicillin/sulbactam  IVPB 3 Gram(s) IV Intermittent every 8 hours  AQUAPHOR (petrolatum Ointment) 1 Application(s) Topical two times a day  aspirin enteric coated 81 milliGRAM(s) Oral daily  atorvastatin 80 milliGRAM(s) Oral at bedtime  chlorhexidine 2% Cloths 1 Application(s) Topical daily  collagenase Ointment 1 Application(s) Topical daily  dextrose 5%. 1000 milliLiter(s) (50 mL/Hr) IV Continuous <Continuous>  dextrose 50% Injectable 12.5 Gram(s) IV Push once  dextrose 50% Injectable 25 Gram(s) IV Push once  dextrose 50% Injectable 25 Gram(s) IV Push once  heparin   Injectable 7500 Unit(s) SubCutaneous every 8 hours  insulin glargine Injectable (LANTUS) 80 Unit(s) SubCutaneous every morning  insulin glargine Injectable (LANTUS) 80 Unit(s) SubCutaneous at bedtime  insulin lispro (ADMELOG) corrective regimen sliding scale   SubCutaneous at bedtime  insulin lispro (ADMELOG) corrective regimen sliding scale   SubCutaneous three times a day before meals  insulin lispro Injectable (ADMELOG) 22 Unit(s) SubCutaneous three times a day before meals  multivitamin 1 Tablet(s) Oral daily  nystatin Powder 1 Application(s) Topical two times a day  oxyCODONE  ER Tablet 80 milliGRAM(s) Oral every 12 hours  senna 2 Tablet(s) Oral at bedtime    MEDICATIONS  (PRN):  clonazePAM  Tablet 0.5 milliGRAM(s) Oral at bedtime PRN ancxiety  dextrose 40% Gel 15 Gram(s) Oral once PRN Blood Glucose LESS THAN 70 milliGRAM(s)/deciliter  glucagon  Injectable 1 milliGRAM(s) IntraMuscular once PRN Glucose LESS THAN 70 milligrams/deciliter  ondansetron Injectable 4 milliGRAM(s) IV Push once PRN Nausea and/or Vomiting  oxyCODONE    IR 5 milliGRAM(s) Oral every 8 hours PRN Breakthrough Pain      Vital Signs Last 24 Hrs  T(C): 36 (17 Nov 2020 12:25), Max: 36.6 (17 Nov 2020 00:01)  T(F): 96.8 (17 Nov 2020 12:25), Max: 97.9 (17 Nov 2020 07:37)  HR: 66 (17 Nov 2020 13:45) (66 - 86)  BP: 127/61 (17 Nov 2020 13:45) (126/69 - 157/70)  BP(mean): 88 (17 Nov 2020 13:45) (85 - 100)  RR: 15 (17 Nov 2020 13:45) (15 - 22)  SpO2: 98% (17 Nov 2020 13:45) (92% - 100%)  CAPILLARY BLOOD GLUCOSE      POCT Blood Glucose.: 92 mg/dL (17 Nov 2020 12:41)  POCT Blood Glucose.: 110 mg/dL (17 Nov 2020 09:54)  POCT Blood Glucose.: 82 mg/dL (16 Nov 2020 21:21)  POCT Blood Glucose.: 107 mg/dL (16 Nov 2020 17:22)    I&O's Summary    16 Nov 2020 07:01  -  17 Nov 2020 07:00  --------------------------------------------------------  IN: 1040 mL / OUT: 2600 mL / NET: -1560 mL        PHYSICAL EXAM:  GENERAL: NAD  EYES: conjunctiva and sclera clear  NECK: Supple, No JVD  CHEST/LUNG: Clear to auscultation bilaterally; No wheeze  HEART: +S1/S2, reg   ABDOMEN: Soft, Nontender  EXTREMITIES:  b/l LE edema   PSYCH: AAOx3      LABS:                        14.0   9.87  )-----------( 237      ( 16 Nov 2020 14:33 )             43.8     11-16    140  |  101  |  31<H>  ----------------------------<  156<H>  4.4   |  27  |  1.65<H>    Ca    9.9      16 Nov 2020 14:33

## 2020-11-17 NOTE — PROGRESS NOTE ADULT - SUBJECTIVE AND OBJECTIVE BOX
Chief Complaint/Follow-up on:     Subjective:    MEDICATIONS  (STANDING):  albuterol/ipratropium for Nebulization. 3 milliLiter(s) Nebulizer every 6 hours  ampicillin/sulbactam  IVPB 3 Gram(s) IV Intermittent every 8 hours  AQUAPHOR (petrolatum Ointment) 1 Application(s) Topical two times a day  aspirin enteric coated 81 milliGRAM(s) Oral daily  atorvastatin 80 milliGRAM(s) Oral at bedtime  chlorhexidine 2% Cloths 1 Application(s) Topical daily  collagenase Ointment 1 Application(s) Topical daily  dextrose 5%. 1000 milliLiter(s) (50 mL/Hr) IV Continuous <Continuous>  dextrose 50% Injectable 12.5 Gram(s) IV Push once  dextrose 50% Injectable 25 Gram(s) IV Push once  dextrose 50% Injectable 25 Gram(s) IV Push once  heparin   Injectable 7500 Unit(s) SubCutaneous every 8 hours  insulin glargine Injectable (LANTUS) 80 Unit(s) SubCutaneous every morning  insulin glargine Injectable (LANTUS) 80 Unit(s) SubCutaneous at bedtime  insulin lispro (ADMELOG) corrective regimen sliding scale   SubCutaneous at bedtime  insulin lispro (ADMELOG) corrective regimen sliding scale   SubCutaneous three times a day before meals  insulin lispro Injectable (ADMELOG) 22 Unit(s) SubCutaneous three times a day before meals  multivitamin 1 Tablet(s) Oral daily  nystatin Powder 1 Application(s) Topical two times a day  oxyCODONE  ER Tablet 80 milliGRAM(s) Oral every 12 hours  senna 2 Tablet(s) Oral at bedtime    MEDICATIONS  (PRN):  clonazePAM  Tablet 0.5 milliGRAM(s) Oral at bedtime PRN ancxiety  dextrose 40% Gel 15 Gram(s) Oral once PRN Blood Glucose LESS THAN 70 milliGRAM(s)/deciliter  glucagon  Injectable 1 milliGRAM(s) IntraMuscular once PRN Glucose LESS THAN 70 milligrams/deciliter  oxyCODONE    IR 5 milliGRAM(s) Oral every 8 hours PRN Breakthrough Pain      PHYSICAL EXAM:  VITALS: T(C): 36.2 (11-17-20 @ 14:30)  T(F): 97.2 (11-17-20 @ 14:30), Max: 97.9 (11-17-20 @ 07:37)  HR: 72 (11-17-20 @ 14:30) (66 - 86)  BP: 145/65 (11-17-20 @ 14:30) (126/59 - 157/70)  RR:  (15 - 22)  SpO2:  (92% - 100%)  Wt(kg): --  GENERAL: NAD, well-groomed, well-developed  EYES: No proptosis, no injection  HEENT:  Atraumatic, Normocephalic, moist mucous membranes  THYROID: Normal size, no palpable nodules  RESPIRATORY: Clear to auscultation bilaterally; No rales, rhonchi, wheezing, or rubs  CARDIOVASCULAR: Regular rate and rhythm; No murmurs; no peripheral edema  GI: Soft, nontender, non distended, normal bowel sounds  CUSHING'S SIGNS: no striae    POCT Blood Glucose.: 92 mg/dL (11-17-20 @ 12:41)  POCT Blood Glucose.: 110 mg/dL (11-17-20 @ 09:54)  POCT Blood Glucose.: 82 mg/dL (11-16-20 @ 21:21)  POCT Blood Glucose.: 107 mg/dL (11-16-20 @ 17:22)  POCT Blood Glucose.: 111 mg/dL (11-16-20 @ 12:54)  POCT Blood Glucose.: 130 mg/dL (11-16-20 @ 09:42)  POCT Blood Glucose.: 155 mg/dL (11-15-20 @ 20:54)  POCT Blood Glucose.: 128 mg/dL (11-15-20 @ 17:15)  POCT Blood Glucose.: 215 mg/dL (11-15-20 @ 11:56)  POCT Blood Glucose.: 182 mg/dL (11-15-20 @ 08:04)  POCT Blood Glucose.: 185 mg/dL (11-14-20 @ 21:16)  POCT Blood Glucose.: 190 mg/dL (11-14-20 @ 17:05)    11-16    140  |  101  |  31<H>  ----------------------------<  156<H>  4.4   |  27  |  1.65<H>    EGFR if : 37<L>  EGFR if non : 32<L>    Ca    9.9      11-16                                 Chief Complaint/Follow-up on: T2D    Subjective: Patient seen in the PACU s/p bone scan. She is hungry as she did not b'fast.  We discussed that I spoke with Dr. Mccann and PharmSHELIA Mcgarry and will try to change her to a more concentrated insulin as well as get her a FreeStyle Nadia.     MEDICATIONS  (STANDING):  albuterol/ipratropium for Nebulization. 3 milliLiter(s) Nebulizer every 6 hours  ampicillin/sulbactam  IVPB 3 Gram(s) IV Intermittent every 8 hours  AQUAPHOR (petrolatum Ointment) 1 Application(s) Topical two times a day  aspirin enteric coated 81 milliGRAM(s) Oral daily  atorvastatin 80 milliGRAM(s) Oral at bedtime  chlorhexidine 2% Cloths 1 Application(s) Topical daily  collagenase Ointment 1 Application(s) Topical daily  dextrose 5%. 1000 milliLiter(s) (50 mL/Hr) IV Continuous <Continuous>  dextrose 50% Injectable 12.5 Gram(s) IV Push once  dextrose 50% Injectable 25 Gram(s) IV Push once  dextrose 50% Injectable 25 Gram(s) IV Push once  heparin   Injectable 7500 Unit(s) SubCutaneous every 8 hours  insulin glargine Injectable (LANTUS) 80 Unit(s) SubCutaneous every morning  insulin glargine Injectable (LANTUS) 80 Unit(s) SubCutaneous at bedtime  insulin lispro (ADMELOG) corrective regimen sliding scale   SubCutaneous at bedtime  insulin lispro (ADMELOG) corrective regimen sliding scale   SubCutaneous three times a day before meals  insulin lispro Injectable (ADMELOG) 22 Unit(s) SubCutaneous three times a day before meals  multivitamin 1 Tablet(s) Oral daily  nystatin Powder 1 Application(s) Topical two times a day  oxyCODONE  ER Tablet 80 milliGRAM(s) Oral every 12 hours  senna 2 Tablet(s) Oral at bedtime    MEDICATIONS  (PRN):  clonazePAM  Tablet 0.5 milliGRAM(s) Oral at bedtime PRN ancxiety  dextrose 40% Gel 15 Gram(s) Oral once PRN Blood Glucose LESS THAN 70 milliGRAM(s)/deciliter  glucagon  Injectable 1 milliGRAM(s) IntraMuscular once PRN Glucose LESS THAN 70 milligrams/deciliter  oxyCODONE    IR 5 milliGRAM(s) Oral every 8 hours PRN Breakthrough Pain      PHYSICAL EXAM:  VITALS: T(C): 36.2 (11-17-20 @ 14:30)  T(F): 97.2 (11-17-20 @ 14:30), Max: 97.9 (11-17-20 @ 07:37)  HR: 72 (11-17-20 @ 14:30) (66 - 86)  BP: 145/65 (11-17-20 @ 14:30) (126/59 - 157/70)  RR:  (15 - 22)  SpO2:  (92% - 100%)  Wt(kg): --  GENERAL: NAD, well-groomed, well-developed  HEENT:  Atraumatic, Normocephalic, moist mucous membranes  RESPIRATORY: Clear to auscultation bilaterally; No rales, rhonchi, wheezing, or rubs  CARDIOVASCULAR: Regular rate and rhythm; No murmursedema  GI: Soft, nontender, non distended, normal bowel sounds      POCT Blood Glucose.: 92 mg/dL (11-17-20 @ 12:41)  POCT Blood Glucose.: 110 mg/dL (11-17-20 @ 09:54)  POCT Blood Glucose.: 82 mg/dL (11-16-20 @ 21:21)  POCT Blood Glucose.: 107 mg/dL (11-16-20 @ 17:22)  POCT Blood Glucose.: 111 mg/dL (11-16-20 @ 12:54)  POCT Blood Glucose.: 130 mg/dL (11-16-20 @ 09:42)  POCT Blood Glucose.: 155 mg/dL (11-15-20 @ 20:54)  POCT Blood Glucose.: 128 mg/dL (11-15-20 @ 17:15)  POCT Blood Glucose.: 215 mg/dL (11-15-20 @ 11:56)  POCT Blood Glucose.: 182 mg/dL (11-15-20 @ 08:04)  POCT Blood Glucose.: 185 mg/dL (11-14-20 @ 21:16)  POCT Blood Glucose.: 190 mg/dL (11-14-20 @ 17:05)    11-16    140  |  101  |  31<H>  ----------------------------<  156<H>  4.4   |  27  |  1.65<H>    EGFR if : 37<L>  EGFR if non : 32<L>    Ca    9.9      11-16

## 2020-11-17 NOTE — PROGRESS NOTE ADULT - PROBLEM SELECTOR PLAN 1
Continue Lantus 80 units sq BID  Continue Humalog to 22 units qac  Mod correction scale qac + bedtime  Goal glucose 100-180  Outpt. endo follow-up  Outpt. optho, podiatry, nephrology  DISPO: reached out to her PCP and asked her to consider switching the patient to Toujeo or Tresiba which are more concentrated or long-acting respectively. Also asked about CGM use.  Basal/bolus, doses TBD. Continue Lantus 80 units sq BID  Continue Humalog to 22 units qac  Mod correction scale qac + bedtime  Goal glucose 100-180  Outpt. endo follow-up  Outpt. optho, podiatry, nephrology  DISPO: basal/bolus, doses TBD. Will try to switch to a more concentrated insulin.

## 2020-11-18 ENCOUNTER — RESULT REVIEW (OUTPATIENT)
Age: 65
End: 2020-11-18

## 2020-11-18 LAB
GLUCOSE BLDC GLUCOMTR-MCNC: 114 MG/DL — HIGH (ref 70–99)
GLUCOSE BLDC GLUCOMTR-MCNC: 129 MG/DL — HIGH (ref 70–99)
GLUCOSE BLDC GLUCOMTR-MCNC: 130 MG/DL — HIGH (ref 70–99)
GLUCOSE BLDC GLUCOMTR-MCNC: 136 MG/DL — HIGH (ref 70–99)
GRAM STN FLD: SIGNIFICANT CHANGE UP
SARS-COV-2 RNA SPEC QL NAA+PROBE: SIGNIFICANT CHANGE UP
SPECIMEN SOURCE: SIGNIFICANT CHANGE UP

## 2020-11-18 PROCEDURE — 99233 SBSQ HOSP IP/OBS HIGH 50: CPT

## 2020-11-18 PROCEDURE — 88305 TISSUE EXAM BY PATHOLOGIST: CPT | Mod: 26

## 2020-11-18 PROCEDURE — 88311 DECALCIFY TISSUE: CPT | Mod: 26

## 2020-11-18 PROCEDURE — 99232 SBSQ HOSP IP/OBS MODERATE 35: CPT

## 2020-11-18 RX ORDER — POLYETHYLENE GLYCOL 3350 17 G/17G
17 POWDER, FOR SOLUTION ORAL ONCE
Refills: 0 | Status: COMPLETED | OUTPATIENT
Start: 2020-11-18 | End: 2020-11-18

## 2020-11-18 RX ADMIN — POLYETHYLENE GLYCOL 3350 17 GRAM(S): 17 POWDER, FOR SOLUTION ORAL at 05:31

## 2020-11-18 RX ADMIN — OXYCODONE HYDROCHLORIDE 80 MILLIGRAM(S): 5 TABLET ORAL at 18:30

## 2020-11-18 RX ADMIN — Medication 22 UNIT(S): at 12:09

## 2020-11-18 RX ADMIN — Medication 3 MILLILITER(S): at 05:36

## 2020-11-18 RX ADMIN — Medication 22 UNIT(S): at 17:48

## 2020-11-18 RX ADMIN — Medication 3 MILLILITER(S): at 12:09

## 2020-11-18 RX ADMIN — Medication 81 MILLIGRAM(S): at 12:09

## 2020-11-18 RX ADMIN — Medication 1 APPLICATION(S): at 05:30

## 2020-11-18 RX ADMIN — HEPARIN SODIUM 7500 UNIT(S): 5000 INJECTION INTRAVENOUS; SUBCUTANEOUS at 14:06

## 2020-11-18 RX ADMIN — SENNA PLUS 2 TABLET(S): 8.6 TABLET ORAL at 21:11

## 2020-11-18 RX ADMIN — OXYCODONE HYDROCHLORIDE 5 MILLIGRAM(S): 5 TABLET ORAL at 10:30

## 2020-11-18 RX ADMIN — Medication 0.5 MILLIGRAM(S): at 21:11

## 2020-11-18 RX ADMIN — OXYCODONE HYDROCHLORIDE 80 MILLIGRAM(S): 5 TABLET ORAL at 17:52

## 2020-11-18 RX ADMIN — OXYCODONE HYDROCHLORIDE 5 MILLIGRAM(S): 5 TABLET ORAL at 22:17

## 2020-11-18 RX ADMIN — ATORVASTATIN CALCIUM 80 MILLIGRAM(S): 80 TABLET, FILM COATED ORAL at 21:11

## 2020-11-18 RX ADMIN — OXYCODONE HYDROCHLORIDE 5 MILLIGRAM(S): 5 TABLET ORAL at 21:17

## 2020-11-18 RX ADMIN — OXYCODONE HYDROCHLORIDE 5 MILLIGRAM(S): 5 TABLET ORAL at 09:46

## 2020-11-18 RX ADMIN — Medication 1 APPLICATION(S): at 17:53

## 2020-11-18 RX ADMIN — HEPARIN SODIUM 7500 UNIT(S): 5000 INJECTION INTRAVENOUS; SUBCUTANEOUS at 05:32

## 2020-11-18 RX ADMIN — Medication 22 UNIT(S): at 09:26

## 2020-11-18 RX ADMIN — AMPICILLIN SODIUM AND SULBACTAM SODIUM 200 GRAM(S): 250; 125 INJECTION, POWDER, FOR SUSPENSION INTRAMUSCULAR; INTRAVENOUS at 21:11

## 2020-11-18 RX ADMIN — NYSTATIN CREAM 1 APPLICATION(S): 100000 CREAM TOPICAL at 17:47

## 2020-11-18 RX ADMIN — HEPARIN SODIUM 7500 UNIT(S): 5000 INJECTION INTRAVENOUS; SUBCUTANEOUS at 21:11

## 2020-11-18 RX ADMIN — INSULIN GLARGINE 80 UNIT(S): 100 INJECTION, SOLUTION SUBCUTANEOUS at 21:24

## 2020-11-18 RX ADMIN — INSULIN GLARGINE 80 UNIT(S): 100 INJECTION, SOLUTION SUBCUTANEOUS at 09:30

## 2020-11-18 RX ADMIN — AMPICILLIN SODIUM AND SULBACTAM SODIUM 200 GRAM(S): 250; 125 INJECTION, POWDER, FOR SUSPENSION INTRAMUSCULAR; INTRAVENOUS at 14:05

## 2020-11-18 RX ADMIN — CHLORHEXIDINE GLUCONATE 1 APPLICATION(S): 213 SOLUTION TOPICAL at 12:10

## 2020-11-18 RX ADMIN — AMPICILLIN SODIUM AND SULBACTAM SODIUM 200 GRAM(S): 250; 125 INJECTION, POWDER, FOR SUSPENSION INTRAMUSCULAR; INTRAVENOUS at 05:39

## 2020-11-18 RX ADMIN — OXYCODONE HYDROCHLORIDE 80 MILLIGRAM(S): 5 TABLET ORAL at 05:32

## 2020-11-18 RX ADMIN — OXYCODONE HYDROCHLORIDE 80 MILLIGRAM(S): 5 TABLET ORAL at 06:11

## 2020-11-18 RX ADMIN — NYSTATIN CREAM 1 APPLICATION(S): 100000 CREAM TOPICAL at 05:30

## 2020-11-18 RX ADMIN — Medication 1 TABLET(S): at 12:10

## 2020-11-18 NOTE — CHART NOTE - NSCHARTNOTEFT_GEN_A_CORE
Nutrition Follow Up Note  Patient seen for: LOS follow up     Interim events noted, chart reviewed. Pt c right foot wound, Podiatry following, S/P bone biopsy earlier today, noted pt is rule out OM. Noted Endocrine following and adjusting insulin as needed.     Source: pt     Diet : Diet, Consistent Carbohydrate w/Evening Snack:   DASH/TLC {Sodium & Cholesterol Restricted} (DASH)  No Concentrated Potassium (11-14-20 @ 17:45)    Patient reports: she has been eating well in house, has been able to complete her meals. Denies any specific GI distress at this time. Reports she is pleased with how controlled her Finger sticks have been in house and hopes to continue have controlled Finger sticks at home on this regimen.       Daily Weight: 11/11: 385->11/18: 394.8 pounds, no edema noted at this time, noted dosing wt of 390 pounds and pt had reported wt of 382 pounds, would continue to monitor     Pertinent Medications: MEDICATIONS  (STANDING):  albuterol/ipratropium for Nebulization. 3 milliLiter(s) Nebulizer every 6 hours  ampicillin/sulbactam  IVPB 3 Gram(s) IV Intermittent every 8 hours  AQUAPHOR (petrolatum Ointment) 1 Application(s) Topical two times a day  aspirin enteric coated 81 milliGRAM(s) Oral daily  atorvastatin 80 milliGRAM(s) Oral at bedtime  chlorhexidine 2% Cloths 1 Application(s) Topical daily  collagenase Ointment 1 Application(s) Topical daily  dextrose 5%. 1000 milliLiter(s) (50 mL/Hr) IV Continuous <Continuous>  dextrose 50% Injectable 12.5 Gram(s) IV Push once  dextrose 50% Injectable 25 Gram(s) IV Push once  dextrose 50% Injectable 25 Gram(s) IV Push once  heparin   Injectable 7500 Unit(s) SubCutaneous every 8 hours  insulin glargine Injectable (LANTUS) 80 Unit(s) SubCutaneous every morning  insulin glargine Injectable (LANTUS) 80 Unit(s) SubCutaneous at bedtime  insulin lispro (ADMELOG) corrective regimen sliding scale   SubCutaneous at bedtime  insulin lispro (ADMELOG) corrective regimen sliding scale   SubCutaneous three times a day before meals  insulin lispro Injectable (ADMELOG) 22 Unit(s) SubCutaneous three times a day before meals  multivitamin 1 Tablet(s) Oral daily  nystatin Powder 1 Application(s) Topical two times a day  oxyCODONE  ER Tablet 80 milliGRAM(s) Oral every 12 hours  senna 2 Tablet(s) Oral at bedtime    MEDICATIONS  (PRN):  clonazePAM  Tablet 0.5 milliGRAM(s) Oral at bedtime PRN ancxiety  dextrose 40% Gel 15 Gram(s) Oral once PRN Blood Glucose LESS THAN 70 milliGRAM(s)/deciliter  glucagon  Injectable 1 milliGRAM(s) IntraMuscular once PRN Glucose LESS THAN 70 milligrams/deciliter  oxyCODONE    IR 5 milliGRAM(s) Oral every 8 hours PRN Breakthrough Pain    Pertinent Labs:   Finger Sticks:  POCT Blood Glucose.: 129 mg/dL (11-18 @ 08:23)  POCT Blood Glucose.: 135 mg/dL (11-17 @ 21:08)  POCT Blood Glucose.: 117 mg/dL (11-17 @ 17:01)  POCT Blood Glucose.: 92 mg/dL (11-17 @ 12:41)      Skin per nursing documentation: no pressure injuries   Edema: 11/17: +2 param. feet     Estimated Needs:   [x] no change since previous assessment      Previous Nutrition Diagnosis:   1. overweight/obesity  2. altered nutrition related lab values    Nutrition Diagnoses continue at this time, addressed c education reinforcement    New Nutrition Diagnosis: none at this time     Recommend  1. Recommend change diet to DASH, consistent CHO c snack diet only given potassium levels have been WNL in house, discussed c SOPHIE Guallpa, order pending verification placed.   2. Encouraged continued balanced intake in house, taking Prot c all meals and snacks, healthy snack options for home reviewed and discussed importance of continued consistent eating pattern when home.     Monitoring and Evaluation:     Continue to monitor Nutritional intake, Tolerance to diet prescription, weights, labs, skin integrity    RD remains available upon request and will follow up per protocol  Opal Hoover, MS RD CDN [facility],  #[phone]

## 2020-11-18 NOTE — PROGRESS NOTE ADULT - SUBJECTIVE AND OBJECTIVE BOX
Patient is a 65y old  Female who presents with a chief complaint of R Foot infection (17 Nov 2020 15:32)    SUBJECTIVE / OVERNIGHT EVENTS: patient underwent bone biopsy this morning and tolerated the procedure well     MEDICATIONS  (STANDING):  albuterol/ipratropium for Nebulization. 3 milliLiter(s) Nebulizer every 6 hours  ampicillin/sulbactam  IVPB 3 Gram(s) IV Intermittent every 8 hours  AQUAPHOR (petrolatum Ointment) 1 Application(s) Topical two times a day  aspirin enteric coated 81 milliGRAM(s) Oral daily  atorvastatin 80 milliGRAM(s) Oral at bedtime  chlorhexidine 2% Cloths 1 Application(s) Topical daily  collagenase Ointment 1 Application(s) Topical daily  dextrose 5%. 1000 milliLiter(s) (50 mL/Hr) IV Continuous <Continuous>  dextrose 50% Injectable 12.5 Gram(s) IV Push once  dextrose 50% Injectable 25 Gram(s) IV Push once  dextrose 50% Injectable 25 Gram(s) IV Push once  heparin   Injectable 7500 Unit(s) SubCutaneous every 8 hours  insulin glargine Injectable (LANTUS) 80 Unit(s) SubCutaneous every morning  insulin glargine Injectable (LANTUS) 80 Unit(s) SubCutaneous at bedtime  insulin lispro (ADMELOG) corrective regimen sliding scale   SubCutaneous at bedtime  insulin lispro (ADMELOG) corrective regimen sliding scale   SubCutaneous three times a day before meals  insulin lispro Injectable (ADMELOG) 22 Unit(s) SubCutaneous three times a day before meals  multivitamin 1 Tablet(s) Oral daily  nystatin Powder 1 Application(s) Topical two times a day  oxyCODONE  ER Tablet 80 milliGRAM(s) Oral every 12 hours  senna 2 Tablet(s) Oral at bedtime    MEDICATIONS  (PRN):  clonazePAM  Tablet 0.5 milliGRAM(s) Oral at bedtime PRN ancxiety  dextrose 40% Gel 15 Gram(s) Oral once PRN Blood Glucose LESS THAN 70 milliGRAM(s)/deciliter  glucagon  Injectable 1 milliGRAM(s) IntraMuscular once PRN Glucose LESS THAN 70 milligrams/deciliter  oxyCODONE    IR 5 milliGRAM(s) Oral every 8 hours PRN Breakthrough Pain      Vital Signs Last 24 Hrs  T(C): 36.7 (18 Nov 2020 07:54), Max: 36.7 (18 Nov 2020 07:54)  T(F): 98.1 (18 Nov 2020 07:54), Max: 98.1 (18 Nov 2020 07:54)  HR: 84 (18 Nov 2020 07:54) (66 - 84)  BP: 155/64 (18 Nov 2020 07:54) (122/67 - 155/64)  BP(mean): 93 (17 Nov 2020 14:30) (85 - 100)  RR: 16 (18 Nov 2020 07:54) (15 - 22)  SpO2: 95% (18 Nov 2020 07:54) (92% - 100%)  CAPILLARY BLOOD GLUCOSE      POCT Blood Glucose.: 129 mg/dL (18 Nov 2020 08:23)  POCT Blood Glucose.: 135 mg/dL (17 Nov 2020 21:08)  POCT Blood Glucose.: 117 mg/dL (17 Nov 2020 17:01)  POCT Blood Glucose.: 92 mg/dL (17 Nov 2020 12:41)    I&O's Summary    17 Nov 2020 07:01  -  18 Nov 2020 07:00  --------------------------------------------------------  IN: 250 mL / OUT: 2000 mL / NET: -1750 mL      PHYSICAL EXAM:  GENERAL: NAD  EYES:  conjunctiva and sclera clear  CHEST/LUNG: Clear to auscultation bilaterally; No wheeze  HEART: +S1/S2, reg   ABDOMEN: Soft, Nontender, Nondistended  EXTREMITIES: b/l LE edema with erythema   PSYCH: AAOx3      LABS:                        14.0   9.87  )-----------( 237      ( 16 Nov 2020 14:33 )             43.8     11-16    140  |  101  |  31<H>  ----------------------------<  156<H>  4.4   |  27  |  1.65<H>    Ca    9.9      16 Nov 2020 14:33

## 2020-11-18 NOTE — PROGRESS NOTE ADULT - ASSESSMENT
66 yo F pt w/ R foot wound, r/o OM  - Pt seen and evaluated  - WBC labeled bone scan + OM, right midfoot  - Pt amenable for bone biopsy, consent obtained and placed in chart  - The foot was prepped in sterile fashion using betadine, using a 15 blade a 1cm incision was made along the lateral aspect of the foot coinciding with the wound to the level of bone, a bone biopsy was then obtained using a bone biopsy needle of the cuboid and sent to pathology & microbiology   - The incision was then closed using 4-0 nylon & dressed with santyl & DSD  - Anticipate awaiting culture results prior to DC  - Seen w/ attending

## 2020-11-18 NOTE — PROGRESS NOTE ADULT - SUBJECTIVE AND OBJECTIVE BOX
Follow Up:  diabetic foot infection and cellulitis    Interval History: bone scan was s/o new uptake in R midfoot so ?osteo    ROS:      All other systems negative    Constitutional: no fever, no chills  Cardiovascular:  no chest pain, no palpitation  Respiratory:  no SOB, no cough  GI:  no abd pain, no vomiting, no diarrhea  urinary: no dysuria, no hematuria, no flank pain  musculoskeletal: slight improvement in R foot redness and pain,  some pain and erythema at the L lateral leg  skin:  no rash, R foot wound and erythema   neurology:  no headache, no seizure          Allergies  adhesives (Rash)  Bactrim (Flushing)  latex (Rash)  wool- rash, itch (Other)        ANTIMICROBIALS:  ampicillin/sulbactam  IVPB 3 every 8 hours      OTHER MEDS:  albuterol/ipratropium for Nebulization. 3 milliLiter(s) Nebulizer every 6 hours  AQUAPHOR (petrolatum Ointment) 1 Application(s) Topical two times a day  aspirin enteric coated 81 milliGRAM(s) Oral daily  atorvastatin 80 milliGRAM(s) Oral at bedtime  chlorhexidine 2% Cloths 1 Application(s) Topical daily  clonazePAM  Tablet 0.5 milliGRAM(s) Oral at bedtime PRN  collagenase Ointment 1 Application(s) Topical daily  dextrose 40% Gel 15 Gram(s) Oral once PRN  dextrose 5%. 1000 milliLiter(s) IV Continuous <Continuous>  dextrose 50% Injectable 12.5 Gram(s) IV Push once  dextrose 50% Injectable 25 Gram(s) IV Push once  dextrose 50% Injectable 25 Gram(s) IV Push once  glucagon  Injectable 1 milliGRAM(s) IntraMuscular once PRN  heparin   Injectable 7500 Unit(s) SubCutaneous every 8 hours  insulin glargine Injectable (LANTUS) 80 Unit(s) SubCutaneous every morning  insulin glargine Injectable (LANTUS) 80 Unit(s) SubCutaneous at bedtime  insulin lispro (ADMELOG) corrective regimen sliding scale   SubCutaneous at bedtime  insulin lispro (ADMELOG) corrective regimen sliding scale   SubCutaneous three times a day before meals  insulin lispro Injectable (ADMELOG) 22 Unit(s) SubCutaneous three times a day before meals  multivitamin 1 Tablet(s) Oral daily  nystatin Powder 1 Application(s) Topical two times a day  oxyCODONE    IR 5 milliGRAM(s) Oral every 8 hours PRN  oxyCODONE  ER Tablet 80 milliGRAM(s) Oral every 12 hours  senna 2 Tablet(s) Oral at bedtime      Vital Signs Last 24 Hrs  T(C): 36.7 (18 Nov 2020 14:20), Max: 36.7 (18 Nov 2020 07:54)  T(F): 98.1 (18 Nov 2020 14:20), Max: 98.1 (18 Nov 2020 07:54)  HR: 81 (18 Nov 2020 14:20) (76 - 84)  BP: 147/53 (18 Nov 2020 14:20) (122/67 - 155/64)  BP(mean): --  RR: 18 (18 Nov 2020 14:20) (16 - 18)  SpO2: 95% (18 Nov 2020 14:20) (92% - 95%)    Physical Exam:  General:    NAD,  non toxic  Cardio:     regular S1, S2,  no murmur  Respiratory:    clear b/l,    no wheezing  abd:     soft,   BS +,   no tenderness  :   no CVAT,  no suprapubic tenderness,   no  de la torre  Musculoskeletal: R plantar foot wound with improved erythema on the dorsal and plantar surface   L lateral LE slightly erythematous on the chronic lesion, it is blanchable  vascular: no phlebitis  Skin:    no rash                  MICROBIOLOGY:  v  .Abscess right foot wound  11-10-20   Culture yields >4 types of aerobic and/or anaerobic bacteria  Call client services within 7 days if further workup is clinically  indicated. Culture includes  Moderate Staphylococcus aureus  --  Staphylococcus aureus      .Blood Blood-Peripheral  11-10-20   No Growth Final  --  --                RADIOLOGY:  Images independently visualized and reviewed personally, findings as below  < from: NM Bone Marrow Imaging Limited (11.17.20 @ 12:19) >  IMPRESSION:  Compared to combined Indium-111 labeled leukocyte study and marrow scan from 7/20/2016: New labeled wbc uptake appears more extensive than sulfur colloid uptake in right midfoot, osteomyelitis cannot be excluded.  Study limited due to patient's body habitus and inability to fully cooperate.

## 2020-11-18 NOTE — PROGRESS NOTE ADULT - PROBLEM SELECTOR PLAN 1
History of chronic wound and charcot foot with concern for overlying infection due to new erythema and reported drainage  - c/w Unasyn, per ID  - Podiatry consult - debrided at bedside and cultures obtained. Follow up wound cultures - Staph aureus and contaminant.   - DVT US - unable to visualize R common femoral, but otherwise negative  - repeat arterial studies ordered which show mild decrease in flow, appreciate vascular recs   -MRI foot not done as machine unable to accommodate patient, can get CT of the foot with contrast, once cleared by renal (consult placed) - patient however now refuses IV contrast over kidney concerns.   -NM bone scan done, concerning for midfoot osteomyelitis, now s/p bone biopsy  -f/u bone biopsy results which will determine the total length of abx treatmetn

## 2020-11-18 NOTE — PROGRESS NOTE ADULT - ASSESSMENT
65 f with  morbid obesity, DM, HTN, asthma, CHF, pulmonary HTN, CKD, diabetic neuropathy, Charcot foot, chronic venous statis and chronic right foot wound, now with R foot erythema   afebrile, WBC normal  s/p debridement by podiatry and cx polymicrobial with anaerobes and also staph aureus, pt had MSSA and finegolida 7/2020  was on vanco and zosyn here with elevated vanco level and increased Cr    diabetic foot infection and cellulitis, r/o osteo but pt did not fit in the MRI and CT with contrast was not done due to renal function a bone scan showed new uptake in R midfoot so possible osteo  s/p bone biopsy by podiatry  JACQUES on CKD    * wound cx with MSSA and polymicrobial with >4 organisms  * f/u the bone biopsy cx and path  * c/w  unasyn 3 q 8, antibiotics started 11/9, now day 10  * f/u with podiatry     The above assessment and plan was discussed with the primary team    Steffany Lockhart MD  Pager [phone]  After 5pm and on weekends call [phone]

## 2020-11-18 NOTE — PROGRESS NOTE ADULT - SUBJECTIVE AND OBJECTIVE BOX
Patient is a 65y old  Female who presents with a chief complaint of R Foot infection (17 Nov 2020 15:32)       INTERVAL HPI/OVERNIGHT EVENTS:  Patient seen and evaluated at bedside.  Pt is resting comfortable in NAD. Denies N/V/F/C.    Allergies    adhesives (Rash)  Bactrim (Flushing)  latex (Rash)  wool- rash, itch (Other)    Intolerances        Vital Signs Last 24 Hrs  T(C): 36.7 (18 Nov 2020 07:54), Max: 36.7 (18 Nov 2020 07:54)  T(F): 98.1 (18 Nov 2020 07:54), Max: 98.1 (18 Nov 2020 07:54)  HR: 84 (18 Nov 2020 07:54) (66 - 84)  BP: 155/64 (18 Nov 2020 07:54) (122/67 - 157/70)  BP(mean): 93 (17 Nov 2020 14:30) (85 - 100)  RR: 16 (18 Nov 2020 07:54) (15 - 22)  SpO2: 95% (18 Nov 2020 07:54) (92% - 100%)    LABS:                        14.0   9.87  )-----------( 237      ( 16 Nov 2020 14:33 )             43.8     11-16    140  |  101  |  31<H>  ----------------------------<  156<H>  4.4   |  27  |  1.65<H>    Ca    9.9      16 Nov 2020 14:33          CAPILLARY BLOOD GLUCOSE      POCT Blood Glucose.: 129 mg/dL (18 Nov 2020 08:23)  POCT Blood Glucose.: 135 mg/dL (17 Nov 2020 21:08)  POCT Blood Glucose.: 117 mg/dL (17 Nov 2020 17:01)  POCT Blood Glucose.: 92 mg/dL (17 Nov 2020 12:41)  POCT Blood Glucose.: 110 mg/dL (17 Nov 2020 09:54)      Lower Extremity Physical Exam:  Vascular: DP/PT 1/4 B/L, CFT <3 seconds B/L, Temperature gradient warm to cool B/L  Neuro: Epicritic sensation diminished to the level of midfoot B/L  Musculoskeletal/Ortho: charcot foot deformity R, non-ambi  Skin: Right plantar midfoot wound to subq, no purulence, no fluctuance, no malodor, hyperkeratotic borders, improved erythema of R dorsal and plantar forefoot ; now with L foot showing signs of erythema noted to forefoot, L forefoot warm to touch, no open wounds to L foot    RADIOLOGY & ADDITIONAL TESTS:  < from: NM Bone Marrow Imaging Limited (11.17.20 @ 12:19) >  EXAM:  NM INFLAMM LOC WBC SA SD                          EXAM:  NM MULTI DAY PROCEDURE                          EXAM:  NM BONE MARROW IMG LTD AREA                                PROCEDURE DATE:  11/17/2020          INTERPRETATION:  CLINICAL INFORMATION: 65-year-old female, diabetic, right mid foot Charcot with plantar ulcer, evaluate for osteomyelitis.    RADIOPHARMACEUTICAL: 0.505 mCi In-111 labeled autologous leukocytes, i.v.injected on 11/16/2020; 9.8 mCi Tc99m sulfur colloid, i.v. injected on 11/17/2020.    TECHNIQUE:  Static images of both feet were obtained in the anterior, posterior, lateral projections approximately 24 hours following administration of radiolabeled leukocytes. The patient then was injected with Tc-99m sulfur colloidand static images of the were repeated in the same projections using dual isotope acquisition mode. Optimal patient postioning, additional imaging including SPECT/CT dual isotope imaging could not be performed due to patient's body habitus and inability to fully cooperate. Imaging was performed with sedation provided by the anesthesiologist.    COMPARISON: In-labeled leukocyte/marrow scan 7/20/2016.    FINDINGS:  There is radiolabeled leukocyte accumulation in the right midfoot, which appears more extensive than uptake and distribution of sulfur colloid uptake in the same location. This finding is new since the prior study.    IMPRESSION:  Compared to combined Indium-111 labeled leukocyte study and marrow scan from 7/20/2016: New labeled wbc uptake appears more extensive than sulfur colloid uptake in right midfoot, osteomyelitis cannot be excluded.  Study limited due to patient's body habitus and inability to fully cooperate.                SHEILA GRECO MD; Attending Nuclear Medicine  Thisdocument has been electronically signed. Nov 17 2020  2:54PM    < end of copied text >

## 2020-11-19 LAB
ANION GAP SERPL CALC-SCNC: 12 MMOL/L — SIGNIFICANT CHANGE UP (ref 5–17)
BUN SERPL-MCNC: 29 MG/DL — HIGH (ref 7–23)
CALCIUM SERPL-MCNC: 9.9 MG/DL — SIGNIFICANT CHANGE UP (ref 8.4–10.5)
CHLORIDE SERPL-SCNC: 102 MMOL/L — SIGNIFICANT CHANGE UP (ref 96–108)
CO2 SERPL-SCNC: 27 MMOL/L — SIGNIFICANT CHANGE UP (ref 22–31)
CREAT SERPL-MCNC: 1.52 MG/DL — HIGH (ref 0.5–1.3)
GLUCOSE BLDC GLUCOMTR-MCNC: 112 MG/DL — HIGH (ref 70–99)
GLUCOSE BLDC GLUCOMTR-MCNC: 113 MG/DL — HIGH (ref 70–99)
GLUCOSE BLDC GLUCOMTR-MCNC: 120 MG/DL — HIGH (ref 70–99)
GLUCOSE BLDC GLUCOMTR-MCNC: 93 MG/DL — SIGNIFICANT CHANGE UP (ref 70–99)
GLUCOSE SERPL-MCNC: 158 MG/DL — HIGH (ref 70–99)
HCT VFR BLD CALC: 42.9 % — SIGNIFICANT CHANGE UP (ref 34.5–45)
HGB BLD-MCNC: 13.3 G/DL — SIGNIFICANT CHANGE UP (ref 11.5–15.5)
MCHC RBC-ENTMCNC: 29.3 PG — SIGNIFICANT CHANGE UP (ref 27–34)
MCHC RBC-ENTMCNC: 31 GM/DL — LOW (ref 32–36)
MCV RBC AUTO: 94.5 FL — SIGNIFICANT CHANGE UP (ref 80–100)
NRBC # BLD: 0 /100 WBCS — SIGNIFICANT CHANGE UP (ref 0–0)
PLATELET # BLD AUTO: 204 K/UL — SIGNIFICANT CHANGE UP (ref 150–400)
POTASSIUM SERPL-MCNC: 4.3 MMOL/L — SIGNIFICANT CHANGE UP (ref 3.5–5.3)
POTASSIUM SERPL-SCNC: 4.3 MMOL/L — SIGNIFICANT CHANGE UP (ref 3.5–5.3)
RBC # BLD: 4.54 M/UL — SIGNIFICANT CHANGE UP (ref 3.8–5.2)
RBC # FLD: 13.7 % — SIGNIFICANT CHANGE UP (ref 10.3–14.5)
SODIUM SERPL-SCNC: 141 MMOL/L — SIGNIFICANT CHANGE UP (ref 135–145)
WBC # BLD: 9.36 K/UL — SIGNIFICANT CHANGE UP (ref 3.8–10.5)
WBC # FLD AUTO: 9.36 K/UL — SIGNIFICANT CHANGE UP (ref 3.8–10.5)

## 2020-11-19 PROCEDURE — 99232 SBSQ HOSP IP/OBS MODERATE 35: CPT

## 2020-11-19 PROCEDURE — 99233 SBSQ HOSP IP/OBS HIGH 50: CPT

## 2020-11-19 PROCEDURE — 99232 SBSQ HOSP IP/OBS MODERATE 35: CPT | Mod: GC

## 2020-11-19 RX ORDER — FUROSEMIDE 40 MG
40 TABLET ORAL DAILY
Refills: 0 | Status: DISCONTINUED | OUTPATIENT
Start: 2020-11-19 | End: 2020-11-25

## 2020-11-19 RX ORDER — POLYETHYLENE GLYCOL 3350 17 G/17G
17 POWDER, FOR SOLUTION ORAL DAILY
Refills: 0 | Status: DISCONTINUED | OUTPATIENT
Start: 2020-11-19 | End: 2020-11-25

## 2020-11-19 RX ADMIN — HEPARIN SODIUM 7500 UNIT(S): 5000 INJECTION INTRAVENOUS; SUBCUTANEOUS at 21:17

## 2020-11-19 RX ADMIN — Medication 81 MILLIGRAM(S): at 12:00

## 2020-11-19 RX ADMIN — ATORVASTATIN CALCIUM 80 MILLIGRAM(S): 80 TABLET, FILM COATED ORAL at 21:17

## 2020-11-19 RX ADMIN — AMPICILLIN SODIUM AND SULBACTAM SODIUM 200 GRAM(S): 250; 125 INJECTION, POWDER, FOR SUSPENSION INTRAMUSCULAR; INTRAVENOUS at 06:06

## 2020-11-19 RX ADMIN — AMPICILLIN SODIUM AND SULBACTAM SODIUM 200 GRAM(S): 250; 125 INJECTION, POWDER, FOR SUSPENSION INTRAMUSCULAR; INTRAVENOUS at 21:17

## 2020-11-19 RX ADMIN — Medication 1 APPLICATION(S): at 09:52

## 2020-11-19 RX ADMIN — OXYCODONE HYDROCHLORIDE 5 MILLIGRAM(S): 5 TABLET ORAL at 23:44

## 2020-11-19 RX ADMIN — OXYCODONE HYDROCHLORIDE 5 MILLIGRAM(S): 5 TABLET ORAL at 08:19

## 2020-11-19 RX ADMIN — OXYCODONE HYDROCHLORIDE 5 MILLIGRAM(S): 5 TABLET ORAL at 16:19

## 2020-11-19 RX ADMIN — Medication 3 MILLILITER(S): at 12:00

## 2020-11-19 RX ADMIN — OXYCODONE HYDROCHLORIDE 80 MILLIGRAM(S): 5 TABLET ORAL at 17:54

## 2020-11-19 RX ADMIN — Medication 0.5 MILLIGRAM(S): at 21:17

## 2020-11-19 RX ADMIN — OXYCODONE HYDROCHLORIDE 5 MILLIGRAM(S): 5 TABLET ORAL at 09:12

## 2020-11-19 RX ADMIN — OXYCODONE HYDROCHLORIDE 80 MILLIGRAM(S): 5 TABLET ORAL at 17:08

## 2020-11-19 RX ADMIN — Medication 1 APPLICATION(S): at 17:08

## 2020-11-19 RX ADMIN — Medication 3 MILLILITER(S): at 17:09

## 2020-11-19 RX ADMIN — OXYCODONE HYDROCHLORIDE 5 MILLIGRAM(S): 5 TABLET ORAL at 17:09

## 2020-11-19 RX ADMIN — HEPARIN SODIUM 7500 UNIT(S): 5000 INJECTION INTRAVENOUS; SUBCUTANEOUS at 06:05

## 2020-11-19 RX ADMIN — OXYCODONE HYDROCHLORIDE 80 MILLIGRAM(S): 5 TABLET ORAL at 06:07

## 2020-11-19 RX ADMIN — HEPARIN SODIUM 7500 UNIT(S): 5000 INJECTION INTRAVENOUS; SUBCUTANEOUS at 12:01

## 2020-11-19 RX ADMIN — Medication 1 APPLICATION(S): at 06:07

## 2020-11-19 RX ADMIN — AMPICILLIN SODIUM AND SULBACTAM SODIUM 200 GRAM(S): 250; 125 INJECTION, POWDER, FOR SUSPENSION INTRAMUSCULAR; INTRAVENOUS at 12:01

## 2020-11-19 RX ADMIN — Medication 1 TABLET(S): at 12:00

## 2020-11-19 RX ADMIN — INSULIN GLARGINE 80 UNIT(S): 100 INJECTION, SOLUTION SUBCUTANEOUS at 08:19

## 2020-11-19 RX ADMIN — Medication 1 DROP(S): at 23:40

## 2020-11-19 RX ADMIN — Medication 3 MILLILITER(S): at 21:22

## 2020-11-19 RX ADMIN — Medication 3 MILLILITER(S): at 06:06

## 2020-11-19 RX ADMIN — INSULIN GLARGINE 80 UNIT(S): 100 INJECTION, SOLUTION SUBCUTANEOUS at 21:22

## 2020-11-19 RX ADMIN — CHLORHEXIDINE GLUCONATE 1 APPLICATION(S): 213 SOLUTION TOPICAL at 11:51

## 2020-11-19 RX ADMIN — Medication 22 UNIT(S): at 12:01

## 2020-11-19 RX ADMIN — Medication 22 UNIT(S): at 17:09

## 2020-11-19 RX ADMIN — Medication 22 UNIT(S): at 08:19

## 2020-11-19 NOTE — PROGRESS NOTE ADULT - SUBJECTIVE AND OBJECTIVE BOX
Patient is a 65y old  Female who presents with a chief complaint of R Foot infection (17 Nov 2020 15:32)       INTERVAL HPI/OVERNIGHT EVENTS:  Patient seen and evaluated at bedside.  Pt is resting comfortable in NAD. Denies N/V/F/C.    Allergies    adhesives (Rash)  Bactrim (Flushing)  latex (Rash)  wool- rash, itch (Other)    Intolerances        Vital Signs Last 24 Hrs  T(C): 36.4 (19 Nov 2020 07:37), Max: 36.7 (18 Nov 2020 14:20)  T(F): 97.6 (19 Nov 2020 07:37), Max: 98.1 (18 Nov 2020 14:20)  HR: 76 (19 Nov 2020 07:37) (71 - 81)  BP: 154/67 (19 Nov 2020 07:37) (145/73 - 154/67)  BP(mean): --  RR: 18 (19 Nov 2020 07:37) (18 - 18)  SpO2: 96% (19 Nov 2020 07:37) (94% - 96%)    LABS:                        13.3   9.36  )-----------( 204      ( 19 Nov 2020 09:49 )             42.9     11-19    141  |  102  |  29<H>  ----------------------------<  158<H>  4.3   |  27  |  1.52<H>    Ca    9.9      19 Nov 2020 09:49          CAPILLARY BLOOD GLUCOSE      POCT Blood Glucose.: 112 mg/dL (19 Nov 2020 08:09)  POCT Blood Glucose.: 130 mg/dL (18 Nov 2020 22:02)  POCT Blood Glucose.: 114 mg/dL (18 Nov 2020 17:10)  POCT Blood Glucose.: 136 mg/dL (18 Nov 2020 11:59)      Lower Extremity Physical Exam:  Vascular: DP/PT 1/4 B/L, CFT <3 seconds B/L, Temperature gradient warm to cool B/L  Neuro: Epicritic sensation diminished to the level of midfoot B/L  Musculoskeletal/Ortho: charcot foot deformity R, non-ambi  Skin: Right plantar midfoot wound to subq, no purulence, no fluctuance, no malodor, hyperkeratotic borders, improved erythema of R dorsal and plantar forefoot ; now with L foot showing signs of erythema noted to forefoot, L forefoot warm to touch, no open wounds to L foot    Now s/p R foot bone biopsy (DOS 11/18/20)  - No strikethrough on dressing  - Sutures intact, no dehiscence, no signs of infection    RADIOLOGY & ADDITIONAL TESTS:

## 2020-11-19 NOTE — PROGRESS NOTE ADULT - SUBJECTIVE AND OBJECTIVE BOX
Patient is a 65y old  Female who presents with a chief complaint of R Foot infection (17 Nov 2020 15:32)    SUBJECTIVE / OVERNIGHT EVENTS: no acute events overnight, pt had BM yesterday    MEDICATIONS  (STANDING):  albuterol/ipratropium for Nebulization. 3 milliLiter(s) Nebulizer every 6 hours  ampicillin/sulbactam  IVPB 3 Gram(s) IV Intermittent every 8 hours  AQUAPHOR (petrolatum Ointment) 1 Application(s) Topical two times a day  aspirin enteric coated 81 milliGRAM(s) Oral daily  atorvastatin 80 milliGRAM(s) Oral at bedtime  chlorhexidine 2% Cloths 1 Application(s) Topical daily  collagenase Ointment 1 Application(s) Topical daily  dextrose 5%. 1000 milliLiter(s) (50 mL/Hr) IV Continuous <Continuous>  dextrose 50% Injectable 12.5 Gram(s) IV Push once  dextrose 50% Injectable 25 Gram(s) IV Push once  dextrose 50% Injectable 25 Gram(s) IV Push once  heparin   Injectable 7500 Unit(s) SubCutaneous every 8 hours  insulin glargine Injectable (LANTUS) 80 Unit(s) SubCutaneous at bedtime  insulin glargine Injectable (LANTUS) 80 Unit(s) SubCutaneous every morning  insulin lispro (ADMELOG) corrective regimen sliding scale   SubCutaneous at bedtime  insulin lispro (ADMELOG) corrective regimen sliding scale   SubCutaneous three times a day before meals  insulin lispro Injectable (ADMELOG) 22 Unit(s) SubCutaneous three times a day before meals  multivitamin 1 Tablet(s) Oral daily  nystatin Powder 1 Application(s) Topical two times a day  oxyCODONE  ER Tablet 80 milliGRAM(s) Oral every 12 hours  senna 2 Tablet(s) Oral at bedtime    MEDICATIONS  (PRN):  clonazePAM  Tablet 0.5 milliGRAM(s) Oral at bedtime PRN ancxiety  dextrose 40% Gel 15 Gram(s) Oral once PRN Blood Glucose LESS THAN 70 milliGRAM(s)/deciliter  glucagon  Injectable 1 milliGRAM(s) IntraMuscular once PRN Glucose LESS THAN 70 milligrams/deciliter  oxyCODONE    IR 5 milliGRAM(s) Oral every 8 hours PRN Breakthrough Pain      Vital Signs Last 24 Hrs  T(C): 36.9 (19 Nov 2020 11:54), Max: 36.9 (19 Nov 2020 11:54)  T(F): 98.4 (19 Nov 2020 11:54), Max: 98.4 (19 Nov 2020 11:54)  HR: 73 (19 Nov 2020 11:54) (71 - 81)  BP: 138/69 (19 Nov 2020 11:54) (138/69 - 154/67)  BP(mean): --  RR: 18 (19 Nov 2020 11:54) (18 - 18)  SpO2: 99% (19 Nov 2020 11:54) (94% - 99%)  CAPILLARY BLOOD GLUCOSE      POCT Blood Glucose.: 120 mg/dL (19 Nov 2020 11:58)  POCT Blood Glucose.: 112 mg/dL (19 Nov 2020 08:09)  POCT Blood Glucose.: 130 mg/dL (18 Nov 2020 22:02)  POCT Blood Glucose.: 114 mg/dL (18 Nov 2020 17:10)    I&O's Summary    18 Nov 2020 07:01  -  19 Nov 2020 07:00  --------------------------------------------------------  IN: 500 mL / OUT: 1600 mL / NET: -1100 mL    19 Nov 2020 07:01  -  19 Nov 2020 13:19  --------------------------------------------------------  IN: 200 mL / OUT: 500 mL / NET: -300 mL      PHYSICAL EXAM:  GENERAL: NAD  EYES: conjunctiva and sclera clear  CHEST/LUNG: Clear to auscultation bilaterally; No wheeze  HEART: +s1/S2, reg   ABDOMEN: Soft, Nontender, Nondistended  EXTREMITIES:  b/l LE edema, dressing in place on R foot   PSYCH: AAOx3      LABS:                        13.3   9.36  )-----------( 204      ( 19 Nov 2020 09:49 )             42.9     11-19    141  |  102  |  29<H>  ----------------------------<  158<H>  4.3   |  27  |  1.52<H>    Ca    9.9      19 Nov 2020 09:49

## 2020-11-19 NOTE — PROGRESS NOTE ADULT - PROBLEM SELECTOR PLAN 1
History of chronic wound and charcot foot with concern for overlying infection due to new erythema and reported drainage  - c/w Unasyn, per ID  - Podiatry consult - debrided at bedside and cultures obtained. Follow up wound cultures - Staph aureus and contaminant.   - DVT US - unable to visualize R common femoral, but otherwise negative  - repeat arterial studies ordered which show mild decrease in flow, appreciate vascular recs   -MRI foot not done as machine unable to accommodate patient, can get CT of the foot with contrast, once cleared by renal (consult placed) - patient however now refuses IV contrast over kidney concerns.   -NM bone scan done, concerning for midfoot osteomyelitis, now s/p bone biopsy  -f/u bone biopsy results which will determine the total length of abx treatment, if cultures do not grow any organisms will f/u ID

## 2020-11-19 NOTE — PROGRESS NOTE ADULT - SUBJECTIVE AND OBJECTIVE BOX
Chief Complaint: Evaluating this 64 y/o F for uncontrolled Type 2 DM w/ hyperglycemia      Interval History: +po intake. Glucose at goal on current regimen.    MEDICATIONS  (STANDING):  albuterol/ipratropium for Nebulization. 3 milliLiter(s) Nebulizer every 6 hours  ampicillin/sulbactam  IVPB 3 Gram(s) IV Intermittent every 8 hours  AQUAPHOR (petrolatum Ointment) 1 Application(s) Topical two times a day  aspirin enteric coated 81 milliGRAM(s) Oral daily  atorvastatin 80 milliGRAM(s) Oral at bedtime  chlorhexidine 2% Cloths 1 Application(s) Topical daily  collagenase Ointment 1 Application(s) Topical daily  dextrose 5%. 1000 milliLiter(s) (50 mL/Hr) IV Continuous <Continuous>  dextrose 50% Injectable 12.5 Gram(s) IV Push once  dextrose 50% Injectable 25 Gram(s) IV Push once  dextrose 50% Injectable 25 Gram(s) IV Push once  furosemide    Tablet 40 milliGRAM(s) Oral daily  heparin   Injectable 7500 Unit(s) SubCutaneous every 8 hours  insulin glargine Injectable (LANTUS) 80 Unit(s) SubCutaneous at bedtime  insulin glargine Injectable (LANTUS) 80 Unit(s) SubCutaneous every morning  insulin lispro (ADMELOG) corrective regimen sliding scale   SubCutaneous at bedtime  insulin lispro (ADMELOG) corrective regimen sliding scale   SubCutaneous three times a day before meals  insulin lispro Injectable (ADMELOG) 22 Unit(s) SubCutaneous three times a day before meals  multivitamin 1 Tablet(s) Oral daily  nystatin Powder 1 Application(s) Topical two times a day  oxyCODONE  ER Tablet 80 milliGRAM(s) Oral every 12 hours  polyethylene glycol 3350 17 Gram(s) Oral daily  senna 2 Tablet(s) Oral at bedtime    MEDICATIONS  (PRN):  clonazePAM  Tablet 0.5 milliGRAM(s) Oral at bedtime PRN ancxiety  dextrose 40% Gel 15 Gram(s) Oral once PRN Blood Glucose LESS THAN 70 milliGRAM(s)/deciliter  glucagon  Injectable 1 milliGRAM(s) IntraMuscular once PRN Glucose LESS THAN 70 milligrams/deciliter  oxyCODONE    IR 5 milliGRAM(s) Oral every 8 hours PRN Breakthrough Pain      Allergies    adhesives (Rash)  Bactrim (Flushing)  latex (Rash)  wool- rash, itch (Other)    Intolerances      Review of Systems:  Constitutional: No fever  Eyes: No blurry vision  Cardiovascular: No chest pain  Respiratory: No SOB  GI: No abdominal pain, No nausea, No vomiting  Endocrine: as noted in HPI    All other negative      PHYSICAL EXAM:  VITALS: T(C): 36.4 (11-19-20 @ 16:09)  T(F): 97.5 (11-19-20 @ 16:09), Max: 98.4 (11-19-20 @ 11:54)  HR: 68 (11-19-20 @ 16:09) (68 - 79)  BP: 148/74 (11-19-20 @ 16:09) (138/69 - 154/67)  RR:  (18 - 18)  SpO2:  (94% - 99%)  Wt(kg): --  GENERAL: NAD at this time  EYES: EOMI, No proptosis  HEENT:  Atraumatic, Normocephalic,   RESPIRATORY: Clear to auscultation bilaterally, full excursion, non labored  CARDIOVASCULAR: Regular rhythm; normal S1/S2, +b/l LE peripheral edema  GI: Soft, nontender, non distended, normal bowel sounds  SKIN: Warm and dry  PSYCH: normal affect, normal mood      POCT Blood Glucose.: 120 mg/dL (11-19-20 @ 11:58)  POCT Blood Glucose.: 112 mg/dL (11-19-20 @ 08:09)  POCT Blood Glucose.: 130 mg/dL (11-18-20 @ 22:02)  POCT Blood Glucose.: 114 mg/dL (11-18-20 @ 17:10)  POCT Blood Glucose.: 136 mg/dL (11-18-20 @ 11:59)  POCT Blood Glucose.: 129 mg/dL (11-18-20 @ 08:23)  POCT Blood Glucose.: 135 mg/dL (11-17-20 @ 21:08)  POCT Blood Glucose.: 117 mg/dL (11-17-20 @ 17:01)  POCT Blood Glucose.: 92 mg/dL (11-17-20 @ 12:41)  POCT Blood Glucose.: 110 mg/dL (11-17-20 @ 09:54)  POCT Blood Glucose.: 82 mg/dL (11-16-20 @ 21:21)  POCT Blood Glucose.: 107 mg/dL (11-16-20 @ 17:22)        11-19    141  |  102  |  29<H>  ----------------------------<  158<H>  4.3   |  27  |  1.52<H>    EGFR if : 41<L>  EGFR if non : 35<L>    Ca    9.9      11-19          Thyroid Function Tests:

## 2020-11-19 NOTE — PROGRESS NOTE ADULT - PROBLEM SELECTOR PLAN 1
Continue Lantus 80 units sq BID  Continue Humalog to 22 units qac  Mod correction scale qac + bedtime  Goal glucose 100-180  Outpt. endo follow-up  Outpt. optho, podiatry, nephrology  DISPO: basal/bolus on current hospital doses, consider concentrated insulin U500 as outpatient.

## 2020-11-19 NOTE — CHART NOTE - NSCHARTNOTEFT_GEN_A_CORE
Patient w CKD, was consulted for possible contrast exposure for CT imaging of LE w IV contrast to r/o osteomyelitis. Patient refuse CT of LE w IV contrast. Her Scr has been stable, last Scr was 1.52. Nephrology will sign off.     If any questions, please feel free to contact me     Valentina Ordoñez  Nephrology Fellow  [facility] Pager: [phone]  [facility] Pager: [phone]

## 2020-11-19 NOTE — PROGRESS NOTE ADULT - ASSESSMENT
65 f with  morbid obesity, DM, HTN, asthma, CHF, pulmonary HTN, CKD, diabetic neuropathy, Charcot foot, chronic venous statis and chronic right foot wound, now with R foot erythema   afebrile, WBC normal  s/p debridement by podiatry and cx polymicrobial with anaerobes and also staph aureus, pt had MSSA and finegolida 7/2020  was on vanco and zosyn here with elevated vanco level and increased Cr    diabetic foot infection and cellulitis, r/o osteo but pt did not fit in the MRI and CT with contrast was not done due to renal function a bone scan showed new uptake in R midfoot so possible osteo  s/p bone biopsy by podiatry  JACQUES on CKD    * wound cx with MSSA and polymicrobial with >4 organisms  * f/u the bone biopsy cx and path  * c/w  unasyn 3 q 8, antibiotics started 11/9, now day 11  * f/u with podiatry   * final antibiotic regimen and duration pending bone biopsy cx and path    The above assessment and plan was discussed with the primary team    Steffany Lockhart MD  Pager [phone]  After 5pm and on weekends call [phone]

## 2020-11-19 NOTE — PROGRESS NOTE ADULT - SUBJECTIVE AND OBJECTIVE BOX
Follow Up:  diabetic foot infection and cellulitis    Interval History: bone scan was s/o new uptake in R midfoot so ?osteo, s/p bone biopsy yesterday, cx negative for now    ROS:      All other systems negative    Constitutional: no fever, no chills  Cardiovascular:  no chest pain, no palpitation  Respiratory:  no SOB, no cough  GI:  no abd pain, no vomiting, no diarrhea  urinary: no dysuria, no hematuria, no flank pain  musculoskeletal: slight improvement in R foot redness and pain,  some pain and erythema at the L lateral leg  skin:  no rash, R foot wound and erythema   neurology:  no headache, no seizure      Allergies  adhesives (Rash)  Bactrim (Flushing)  latex (Rash)  wool- rash, itch (Other)        ANTIMICROBIALS:  ampicillin/sulbactam  IVPB 3 every 8 hours      OTHER MEDS:  albuterol/ipratropium for Nebulization. 3 milliLiter(s) Nebulizer every 6 hours  AQUAPHOR (petrolatum Ointment) 1 Application(s) Topical two times a day  aspirin enteric coated 81 milliGRAM(s) Oral daily  atorvastatin 80 milliGRAM(s) Oral at bedtime  chlorhexidine 2% Cloths 1 Application(s) Topical daily  clonazePAM  Tablet 0.5 milliGRAM(s) Oral at bedtime PRN  collagenase Ointment 1 Application(s) Topical daily  dextrose 40% Gel 15 Gram(s) Oral once PRN  dextrose 5%. 1000 milliLiter(s) IV Continuous <Continuous>  dextrose 50% Injectable 12.5 Gram(s) IV Push once  dextrose 50% Injectable 25 Gram(s) IV Push once  dextrose 50% Injectable 25 Gram(s) IV Push once  glucagon  Injectable 1 milliGRAM(s) IntraMuscular once PRN  heparin   Injectable 7500 Unit(s) SubCutaneous every 8 hours  insulin glargine Injectable (LANTUS) 80 Unit(s) SubCutaneous at bedtime  insulin glargine Injectable (LANTUS) 80 Unit(s) SubCutaneous every morning  insulin lispro (ADMELOG) corrective regimen sliding scale   SubCutaneous at bedtime  insulin lispro (ADMELOG) corrective regimen sliding scale   SubCutaneous three times a day before meals  insulin lispro Injectable (ADMELOG) 22 Unit(s) SubCutaneous three times a day before meals  multivitamin 1 Tablet(s) Oral daily  nystatin Powder 1 Application(s) Topical two times a day  oxyCODONE    IR 5 milliGRAM(s) Oral every 8 hours PRN  oxyCODONE  ER Tablet 80 milliGRAM(s) Oral every 12 hours  senna 2 Tablet(s) Oral at bedtime      Vital Signs Last 24 Hrs  T(C): 36.4 (19 Nov 2020 07:37), Max: 36.7 (18 Nov 2020 14:20)  T(F): 97.6 (19 Nov 2020 07:37), Max: 98.1 (18 Nov 2020 14:20)  HR: 76 (19 Nov 2020 07:37) (71 - 81)  BP: 154/67 (19 Nov 2020 07:37) (145/73 - 154/67)  BP(mean): --  RR: 18 (19 Nov 2020 07:37) (18 - 18)  SpO2: 96% (19 Nov 2020 07:37) (94% - 96%)    Physical Exam:  General:    NAD,  non toxic  Cardio:     regular S1, S2,  no murmur  Respiratory:    clear b/l,    no wheezing  abd:     soft,   BS +,   no tenderness  :   no CVAT,  no suprapubic tenderness,   no  de la torre  Musculoskeletal: R plantar foot wound with improved erythema on the dorsal and plantar surface   L lateral LE slightly erythematous on the chronic lesion, it is blanchable  vascular: no phlebitis  Skin:    no rash                          13.3   9.36  )-----------( 204      ( 19 Nov 2020 09:49 )             42.9       11-19    141  |  102  |  29<H>  ----------------------------<  158<H>  4.3   |  27  |  1.52<H>    Ca    9.9      19 Nov 2020 09:49            MICROBIOLOGY:  v  .Tissue Right foot bone biopsy  11-18-20 --  --    No polymorphonuclear cells seen per low power field  No organisms seen per oil power field      .Abscess right foot wound  11-10-20   Culture yields >4 types of aerobic and/or anaerobic bacteria  Call client services within 7 days if further workup is clinically  indicated. Culture includes  Moderate Staphylococcus aureus  --  Staphylococcus aureus      .Blood Blood-Peripheral  11-10-20   No Growth Final  --  --                RADIOLOGY:  Images independently visualized and reviewed personally, findings as below  < from: NM Bone Marrow Imaging Limited (11.17.20 @ 12:19) >  IMPRESSION:  Compared to combined Indium-111 labeled leukocyte study and marrow scan from 7/20/2016: New labeled wbc uptake appears more extensive than sulfur colloid uptake in right midfoot, osteomyelitis cannot be excluded.  Study limited due to patient's body habitus and inability to fully cooperate.

## 2020-11-20 ENCOUNTER — TRANSCRIPTION ENCOUNTER (OUTPATIENT)
Age: 65
End: 2020-11-20

## 2020-11-20 LAB
ANION GAP SERPL CALC-SCNC: 14 MMOL/L — SIGNIFICANT CHANGE UP (ref 5–17)
BUN SERPL-MCNC: 29 MG/DL — HIGH (ref 7–23)
CALCIUM SERPL-MCNC: 10 MG/DL — SIGNIFICANT CHANGE UP (ref 8.4–10.5)
CHLORIDE SERPL-SCNC: 102 MMOL/L — SIGNIFICANT CHANGE UP (ref 96–108)
CO2 SERPL-SCNC: 26 MMOL/L — SIGNIFICANT CHANGE UP (ref 22–31)
CREAT SERPL-MCNC: 1.6 MG/DL — HIGH (ref 0.5–1.3)
GLUCOSE BLDC GLUCOMTR-MCNC: 100 MG/DL — HIGH (ref 70–99)
GLUCOSE BLDC GLUCOMTR-MCNC: 100 MG/DL — HIGH (ref 70–99)
GLUCOSE BLDC GLUCOMTR-MCNC: 112 MG/DL — HIGH (ref 70–99)
GLUCOSE BLDC GLUCOMTR-MCNC: 121 MG/DL — HIGH (ref 70–99)
GLUCOSE SERPL-MCNC: 135 MG/DL — HIGH (ref 70–99)
HCT VFR BLD CALC: 43 % — SIGNIFICANT CHANGE UP (ref 34.5–45)
HGB BLD-MCNC: 13.3 G/DL — SIGNIFICANT CHANGE UP (ref 11.5–15.5)
MCHC RBC-ENTMCNC: 29 PG — SIGNIFICANT CHANGE UP (ref 27–34)
MCHC RBC-ENTMCNC: 30.9 GM/DL — LOW (ref 32–36)
MCV RBC AUTO: 93.9 FL — SIGNIFICANT CHANGE UP (ref 80–100)
NRBC # BLD: 0 /100 WBCS — SIGNIFICANT CHANGE UP (ref 0–0)
PLATELET # BLD AUTO: 235 K/UL — SIGNIFICANT CHANGE UP (ref 150–400)
POTASSIUM SERPL-MCNC: 4.2 MMOL/L — SIGNIFICANT CHANGE UP (ref 3.5–5.3)
POTASSIUM SERPL-SCNC: 4.2 MMOL/L — SIGNIFICANT CHANGE UP (ref 3.5–5.3)
RBC # BLD: 4.58 M/UL — SIGNIFICANT CHANGE UP (ref 3.8–5.2)
RBC # FLD: 14 % — SIGNIFICANT CHANGE UP (ref 10.3–14.5)
SODIUM SERPL-SCNC: 142 MMOL/L — SIGNIFICANT CHANGE UP (ref 135–145)
WBC # BLD: 10.01 K/UL — SIGNIFICANT CHANGE UP (ref 3.8–10.5)
WBC # FLD AUTO: 10.01 K/UL — SIGNIFICANT CHANGE UP (ref 3.8–10.5)

## 2020-11-20 PROCEDURE — 99232 SBSQ HOSP IP/OBS MODERATE 35: CPT

## 2020-11-20 PROCEDURE — 99232 SBSQ HOSP IP/OBS MODERATE 35: CPT | Mod: GC

## 2020-11-20 RX ORDER — FLUCONAZOLE 150 MG/1
200 TABLET ORAL ONCE
Refills: 0 | Status: COMPLETED | OUTPATIENT
Start: 2020-11-20 | End: 2020-11-20

## 2020-11-20 RX ORDER — OXYCODONE HYDROCHLORIDE 5 MG/1
5 TABLET ORAL ONCE
Refills: 0 | Status: DISCONTINUED | OUTPATIENT
Start: 2020-11-20 | End: 2020-11-20

## 2020-11-20 RX ADMIN — INSULIN GLARGINE 80 UNIT(S): 100 INJECTION, SOLUTION SUBCUTANEOUS at 21:09

## 2020-11-20 RX ADMIN — HEPARIN SODIUM 7500 UNIT(S): 5000 INJECTION INTRAVENOUS; SUBCUTANEOUS at 12:23

## 2020-11-20 RX ADMIN — AMPICILLIN SODIUM AND SULBACTAM SODIUM 200 GRAM(S): 250; 125 INJECTION, POWDER, FOR SUSPENSION INTRAMUSCULAR; INTRAVENOUS at 06:06

## 2020-11-20 RX ADMIN — SENNA PLUS 2 TABLET(S): 8.6 TABLET ORAL at 21:08

## 2020-11-20 RX ADMIN — Medication 81 MILLIGRAM(S): at 11:05

## 2020-11-20 RX ADMIN — Medication 3 MILLILITER(S): at 11:06

## 2020-11-20 RX ADMIN — OXYCODONE HYDROCHLORIDE 5 MILLIGRAM(S): 5 TABLET ORAL at 00:20

## 2020-11-20 RX ADMIN — HEPARIN SODIUM 7500 UNIT(S): 5000 INJECTION INTRAVENOUS; SUBCUTANEOUS at 06:06

## 2020-11-20 RX ADMIN — Medication 1 TABLET(S): at 11:05

## 2020-11-20 RX ADMIN — INSULIN GLARGINE 80 UNIT(S): 100 INJECTION, SOLUTION SUBCUTANEOUS at 08:00

## 2020-11-20 RX ADMIN — Medication 1 DROP(S): at 11:05

## 2020-11-20 RX ADMIN — OXYCODONE HYDROCHLORIDE 5 MILLIGRAM(S): 5 TABLET ORAL at 11:10

## 2020-11-20 RX ADMIN — Medication 22 UNIT(S): at 08:01

## 2020-11-20 RX ADMIN — Medication 1 DROP(S): at 08:01

## 2020-11-20 RX ADMIN — Medication 1 DROP(S): at 17:23

## 2020-11-20 RX ADMIN — OXYCODONE HYDROCHLORIDE 80 MILLIGRAM(S): 5 TABLET ORAL at 18:14

## 2020-11-20 RX ADMIN — Medication 3 MILLILITER(S): at 17:23

## 2020-11-20 RX ADMIN — OXYCODONE HYDROCHLORIDE 80 MILLIGRAM(S): 5 TABLET ORAL at 06:09

## 2020-11-20 RX ADMIN — OXYCODONE HYDROCHLORIDE 5 MILLIGRAM(S): 5 TABLET ORAL at 22:10

## 2020-11-20 RX ADMIN — CHLORHEXIDINE GLUCONATE 1 APPLICATION(S): 213 SOLUTION TOPICAL at 11:09

## 2020-11-20 RX ADMIN — Medication 1 APPLICATION(S): at 11:12

## 2020-11-20 RX ADMIN — OXYCODONE HYDROCHLORIDE 5 MILLIGRAM(S): 5 TABLET ORAL at 14:49

## 2020-11-20 RX ADMIN — OXYCODONE HYDROCHLORIDE 5 MILLIGRAM(S): 5 TABLET ORAL at 15:41

## 2020-11-20 RX ADMIN — Medication 22 UNIT(S): at 12:22

## 2020-11-20 RX ADMIN — Medication 40 MILLIGRAM(S): at 06:07

## 2020-11-20 RX ADMIN — OXYCODONE HYDROCHLORIDE 5 MILLIGRAM(S): 5 TABLET ORAL at 10:11

## 2020-11-20 RX ADMIN — OXYCODONE HYDROCHLORIDE 5 MILLIGRAM(S): 5 TABLET ORAL at 21:08

## 2020-11-20 RX ADMIN — AMPICILLIN SODIUM AND SULBACTAM SODIUM 200 GRAM(S): 250; 125 INJECTION, POWDER, FOR SUSPENSION INTRAMUSCULAR; INTRAVENOUS at 21:09

## 2020-11-20 RX ADMIN — AMPICILLIN SODIUM AND SULBACTAM SODIUM 200 GRAM(S): 250; 125 INJECTION, POWDER, FOR SUSPENSION INTRAMUSCULAR; INTRAVENOUS at 12:23

## 2020-11-20 RX ADMIN — Medication 1 APPLICATION(S): at 17:23

## 2020-11-20 RX ADMIN — Medication 0.5 MILLIGRAM(S): at 21:08

## 2020-11-20 RX ADMIN — OXYCODONE HYDROCHLORIDE 80 MILLIGRAM(S): 5 TABLET ORAL at 17:22

## 2020-11-20 RX ADMIN — FLUCONAZOLE 200 MILLIGRAM(S): 150 TABLET ORAL at 14:30

## 2020-11-20 RX ADMIN — Medication 22 UNIT(S): at 17:22

## 2020-11-20 RX ADMIN — ATORVASTATIN CALCIUM 80 MILLIGRAM(S): 80 TABLET, FILM COATED ORAL at 21:08

## 2020-11-20 RX ADMIN — HEPARIN SODIUM 7500 UNIT(S): 5000 INJECTION INTRAVENOUS; SUBCUTANEOUS at 21:12

## 2020-11-20 RX ADMIN — Medication 1 APPLICATION(S): at 06:07

## 2020-11-20 NOTE — PROGRESS NOTE ADULT - ASSESSMENT
65 f with  morbid obesity, DM, HTN, asthma, CHF, pulmonary HTN, CKD, diabetic neuropathy, Charcot foot, chronic venous statis and chronic right foot wound, now with R foot erythema   afebrile, WBC normal  s/p debridement by podiatry and cx polymicrobial with anaerobes and also staph aureus, pt had MSSA and finegolida 7/2020  was on vanco and zosyn here with elevated vanco level and increased Cr    diabetic foot infection and cellulitis, r/o osteo but pt did not fit in the MRI and CT with contrast was not done due to renal function a bone scan showed new uptake in R midfoot so possible osteo  s/p bone biopsy by podiatry  JACQUES on CKD    * wound cx with MSSA and polymicrobial with >4 organisms  * f/u the bone biopsy cx and path  * c/w  unasyn 3 q 8, antibiotics started 11/9, now day 12  * f/u with podiatry   * final antibiotic regimen and duration pending bone biopsy cx and path but with the bone scan results and given the chronic wound, will likely treat as osteo with a 6 week course    The above assessment and plan was discussed with the primary team    Steffany Lockhart MD  Pager [phone]  After 5pm and on weekends call [phone]

## 2020-11-20 NOTE — DISCHARGE NOTE NURSING/CASE MANAGEMENT/SOCIAL WORK - NSDCVIVACCINE_GEN_ALL_CORE_FT
Influenza , 2018/11/9 13:40 , Mane Camejo (RN)  Influenza , 2020/11/15 12:04 , Ramiro Alexander (RN)

## 2020-11-20 NOTE — PROGRESS NOTE ADULT - PROBLEM SELECTOR PLAN 1
History of chronic wound and charcot foot with concern for overlying infection due to new erythema and reported drainage  - c/w Unasyn, per ID  - Podiatry consult - debrided at bedside and cultures obtained. Follow up wound cultures - Staph aureus and contaminant.   - DVT US - unable to visualize R common femoral, but otherwise negative  - repeat arterial studies ordered which show mild decrease in flow, appreciate vascular recs   -MRI foot not done as machine unable to accommodate patient, can get CT of the foot with contrast, once cleared by renal (consult placed) - patient however now refuses IV contrast over kidney concerns.   -NM bone scan done, concerning for midfoot osteomyelitis, now s/p bone biopsy  -f/u bone biopsy results which will determine the total length of abx treatment  -discussed with ID today, prelim bone biopsy cultures with no growth. Awaiting bone biopsy PATH report as well. If both are neg, less likely to treat with IV abx long term. Will need that information before decision regarding PICC and IV abx can be made.

## 2020-11-20 NOTE — PROGRESS NOTE ADULT - SUBJECTIVE AND OBJECTIVE BOX
Follow Up:  diabetic foot infection and cellulitis    Interval History: still some foot pain, the bone cx negative for now    ROS:      All other systems negative    Constitutional: no fever, no chills  Cardiovascular:  no chest pain, no palpitation  Respiratory:  no SOB, no cough  GI:  no abd pain, no vomiting, no diarrhea  urinary: no dysuria, no hematuria, no flank pain  musculoskeletal: slight improvement in R foot redness and pain,  some pain and erythema at the L lateral leg  skin:  no rash, R foot wound   neurology:  no headache, no seizure          Allergies  adhesives (Rash)  Bactrim (Flushing)  latex (Rash)  wool- rash, itch (Other)        ANTIMICROBIALS:  ampicillin/sulbactam  IVPB 3 every 8 hours      OTHER MEDS:  albuterol/ipratropium for Nebulization. 3 milliLiter(s) Nebulizer every 6 hours  AQUAPHOR (petrolatum Ointment) 1 Application(s) Topical two times a day  artificial tears (preservative free) Ophthalmic Solution 1 Drop(s) Both EYES every 6 hours  aspirin enteric coated 81 milliGRAM(s) Oral daily  atorvastatin 80 milliGRAM(s) Oral at bedtime  chlorhexidine 2% Cloths 1 Application(s) Topical daily  clonazePAM  Tablet 0.5 milliGRAM(s) Oral at bedtime PRN  collagenase Ointment 1 Application(s) Topical daily  dextrose 40% Gel 15 Gram(s) Oral once PRN  dextrose 5%. 1000 milliLiter(s) IV Continuous <Continuous>  dextrose 50% Injectable 12.5 Gram(s) IV Push once  dextrose 50% Injectable 25 Gram(s) IV Push once  dextrose 50% Injectable 25 Gram(s) IV Push once  furosemide    Tablet 40 milliGRAM(s) Oral daily  glucagon  Injectable 1 milliGRAM(s) IntraMuscular once PRN  heparin   Injectable 7500 Unit(s) SubCutaneous every 8 hours  insulin glargine Injectable (LANTUS) 80 Unit(s) SubCutaneous every morning  insulin glargine Injectable (LANTUS) 80 Unit(s) SubCutaneous at bedtime  insulin lispro (ADMELOG) corrective regimen sliding scale   SubCutaneous at bedtime  insulin lispro (ADMELOG) corrective regimen sliding scale   SubCutaneous three times a day before meals  insulin lispro Injectable (ADMELOG) 22 Unit(s) SubCutaneous three times a day before meals  multivitamin 1 Tablet(s) Oral daily  nystatin Powder 1 Application(s) Topical two times a day  oxyCODONE    IR 5 milliGRAM(s) Oral every 8 hours PRN  oxyCODONE  ER Tablet 80 milliGRAM(s) Oral every 12 hours  polyethylene glycol 3350 17 Gram(s) Oral daily  senna 2 Tablet(s) Oral at bedtime      Vital Signs Last 24 Hrs  T(C): 36.9 (20 Nov 2020 09:00), Max: 36.9 (19 Nov 2020 11:54)  T(F): 98.4 (20 Nov 2020 09:00), Max: 98.4 (19 Nov 2020 11:54)  HR: 80 (20 Nov 2020 09:00) (68 - 81)  BP: 138/71 (20 Nov 2020 09:00) (138/69 - 159/73)  BP(mean): 102 (20 Nov 2020 06:02) (102 - 102)  RR: 18 (20 Nov 2020 09:00) (18 - 18)  SpO2: 98% (20 Nov 2020 09:00) (97% - 99%)    Physical Exam:  General:    NAD,  non toxic  Cardio:     regular S1, S2,  no murmur  Respiratory:    clear b/l,    no wheezing  abd:     soft,   BS +,   no tenderness  :   no CVAT,  no suprapubic tenderness,   no  de la torre  Musculoskeletal: R plantar foot wound with improved erythema on the dorsal and plantar surface   L lateral LE slightly erythematous on the chronic lesion, it is blanchable  vascular: no phlebitis  Skin:    no rash                        13.3   10.01 )-----------( 235      ( 20 Nov 2020 10:39 )             43.0       11-20    142  |  102  |  29<H>  ----------------------------<  135<H>  4.2   |  26  |  1.60<H>    Ca    10.0      20 Nov 2020 10:38            MICROBIOLOGY:  v  .Tissue Right foot bone biopsy  11-18-20   No growth  --    No polymorphonuclear cells seen per low power field  No organisms seen per oil power field      .Abscess right foot wound  11-10-20   Culture yields >4 types of aerobic and/or anaerobic bacteria  Call client services within 7 days if further workup is clinically  indicated. Culture includes  Moderate Staphylococcus aureus  --  Staphylococcus aureus      .Blood Blood-Peripheral  11-10-20   No Growth Final  --  --                RADIOLOGY:  Images independently visualized and reviewed personally, findings as below  < from: NM Bone Marrow Imaging Limited (11.17.20 @ 12:19) >  IMPRESSION:  Compared to combined Indium-111 labeled leukocyte study and marrow scan from 7/20/2016: New labeled wbc uptake appears more extensive than sulfur colloid uptake in right midfoot, osteomyelitis cannot be excluded.  Study limited due to patient's body habitus and inability to fully cooperate.      < end of copied text >  < from: NM Inflammatory Loc WBC, Single Area Single Day (11.17.20 @ 10:58) >    IMPRESSION:  Compared to combined Indium-111 labeled leukocyte study and marrow scan from 7/20/2016: New labeled wbc uptake appears more extensive than sulfur colloid uptake in right midfoot, osteomyelitis cannot be excluded.  Study limited due to patient's body habitus and inability to fully cooperate.        < from: Xray Foot AP + Lateral + Oblique, Right (11.09.20 @ 17:43) >  1. No soft tissue gas or acute osseous destruction is appreciated.  2. Charcot changes of the midfoot are again seen.  3. MRI may be helpful for further evaluation of osteomyelitis as warranted.

## 2020-11-20 NOTE — PROGRESS NOTE ADULT - SUBJECTIVE AND OBJECTIVE BOX
Patient is a 65y old  Female who presents with a chief complaint of Right foot infection (19 Nov 2020 16:39)    SUBJECTIVE / OVERNIGHT EVENTS: no acute events overnight     MEDICATIONS  (STANDING):  albuterol/ipratropium for Nebulization. 3 milliLiter(s) Nebulizer every 6 hours  ampicillin/sulbactam  IVPB 3 Gram(s) IV Intermittent every 8 hours  AQUAPHOR (petrolatum Ointment) 1 Application(s) Topical two times a day  artificial tears (preservative free) Ophthalmic Solution 1 Drop(s) Both EYES every 6 hours  aspirin enteric coated 81 milliGRAM(s) Oral daily  atorvastatin 80 milliGRAM(s) Oral at bedtime  chlorhexidine 2% Cloths 1 Application(s) Topical daily  collagenase Ointment 1 Application(s) Topical daily  dextrose 5%. 1000 milliLiter(s) (50 mL/Hr) IV Continuous <Continuous>  dextrose 50% Injectable 12.5 Gram(s) IV Push once  dextrose 50% Injectable 25 Gram(s) IV Push once  dextrose 50% Injectable 25 Gram(s) IV Push once  furosemide    Tablet 40 milliGRAM(s) Oral daily  heparin   Injectable 7500 Unit(s) SubCutaneous every 8 hours  insulin glargine Injectable (LANTUS) 80 Unit(s) SubCutaneous every morning  insulin glargine Injectable (LANTUS) 80 Unit(s) SubCutaneous at bedtime  insulin lispro (ADMELOG) corrective regimen sliding scale   SubCutaneous at bedtime  insulin lispro (ADMELOG) corrective regimen sliding scale   SubCutaneous three times a day before meals  insulin lispro Injectable (ADMELOG) 22 Unit(s) SubCutaneous three times a day before meals  multivitamin 1 Tablet(s) Oral daily  nystatin Powder 1 Application(s) Topical two times a day  oxyCODONE  ER Tablet 80 milliGRAM(s) Oral every 12 hours  polyethylene glycol 3350 17 Gram(s) Oral daily  senna 2 Tablet(s) Oral at bedtime    MEDICATIONS  (PRN):  clonazePAM  Tablet 0.5 milliGRAM(s) Oral at bedtime PRN ancxiety  dextrose 40% Gel 15 Gram(s) Oral once PRN Blood Glucose LESS THAN 70 milliGRAM(s)/deciliter  glucagon  Injectable 1 milliGRAM(s) IntraMuscular once PRN Glucose LESS THAN 70 milligrams/deciliter  oxyCODONE    IR 5 milliGRAM(s) Oral every 8 hours PRN Breakthrough Pain      Vital Signs Last 24 Hrs  T(C): 36.9 (20 Nov 2020 13:29), Max: 36.9 (20 Nov 2020 09:00)  T(F): 98.4 (20 Nov 2020 13:29), Max: 98.4 (20 Nov 2020 09:00)  HR: 78 (20 Nov 2020 13:29) (68 - 81)  BP: 133/69 (20 Nov 2020 13:29) (133/69 - 159/73)  BP(mean): 102 (20 Nov 2020 06:02) (102 - 102)  RR: 18 (20 Nov 2020 13:29) (18 - 18)  SpO2: 96% (20 Nov 2020 13:29) (96% - 98%)  CAPILLARY BLOOD GLUCOSE      POCT Blood Glucose.: 112 mg/dL (20 Nov 2020 12:19)  POCT Blood Glucose.: 100 mg/dL (20 Nov 2020 07:57)  POCT Blood Glucose.: 113 mg/dL (19 Nov 2020 21:06)  POCT Blood Glucose.: 93 mg/dL (19 Nov 2020 16:53)    I&O's Summary    19 Nov 2020 07:01  -  20 Nov 2020 07:00  --------------------------------------------------------  IN: 950 mL / OUT: 1600 mL / NET: -650 mL    20 Nov 2020 07:01  -  20 Nov 2020 14:13  --------------------------------------------------------  IN: 340 mL / OUT: 1200 mL / NET: -860 mL        PHYSICAL EXAM:  GENERAL: NAD  EYES: conjunctiva and sclera clear  CHEST/LUNG: Clear to auscultation bilaterally; No wheeze  HEART: +S1/S2, reg  ABDOMEN: Soft, Nontender, Nondistended  EXTREMITIES:  +b/l LE edema with erythema and tenderness of the R foot wound   PSYCH: AAOx3      LABS:                        13.3   10.01 )-----------( 235      ( 20 Nov 2020 10:39 )             43.0     11-20    142  |  102  |  29<H>  ----------------------------<  135<H>  4.2   |  26  |  1.60<H>    Ca    10.0      20 Nov 2020 10:38          Care Discussed with Consultants/Other Providers: Dr. Lockhart

## 2020-11-21 LAB
GLUCOSE BLDC GLUCOMTR-MCNC: 139 MG/DL — HIGH (ref 70–99)
GLUCOSE BLDC GLUCOMTR-MCNC: 81 MG/DL — SIGNIFICANT CHANGE UP (ref 70–99)
GLUCOSE BLDC GLUCOMTR-MCNC: 86 MG/DL — SIGNIFICANT CHANGE UP (ref 70–99)
GLUCOSE BLDC GLUCOMTR-MCNC: 94 MG/DL — SIGNIFICANT CHANGE UP (ref 70–99)

## 2020-11-21 PROCEDURE — 99232 SBSQ HOSP IP/OBS MODERATE 35: CPT

## 2020-11-21 RX ORDER — INSULIN GLARGINE 100 [IU]/ML
70 INJECTION, SOLUTION SUBCUTANEOUS AT BEDTIME
Refills: 0 | Status: DISCONTINUED | OUTPATIENT
Start: 2020-11-21 | End: 2020-11-25

## 2020-11-21 RX ORDER — INSULIN LISPRO 100/ML
15 VIAL (ML) SUBCUTANEOUS ONCE
Refills: 0 | Status: COMPLETED | OUTPATIENT
Start: 2020-11-21 | End: 2020-11-21

## 2020-11-21 RX ORDER — INSULIN GLARGINE 100 [IU]/ML
60 INJECTION, SOLUTION SUBCUTANEOUS EVERY MORNING
Refills: 0 | Status: DISCONTINUED | OUTPATIENT
Start: 2020-11-21 | End: 2020-11-25

## 2020-11-21 RX ORDER — INSULIN LISPRO 100/ML
15 VIAL (ML) SUBCUTANEOUS
Refills: 0 | Status: DISCONTINUED | OUTPATIENT
Start: 2020-11-21 | End: 2020-11-25

## 2020-11-21 RX ORDER — OXYCODONE HYDROCHLORIDE 5 MG/1
5 TABLET ORAL ONCE
Refills: 0 | Status: DISCONTINUED | OUTPATIENT
Start: 2020-11-21 | End: 2020-11-21

## 2020-11-21 RX ADMIN — Medication 1 DROP(S): at 11:26

## 2020-11-21 RX ADMIN — POLYETHYLENE GLYCOL 3350 17 GRAM(S): 17 POWDER, FOR SOLUTION ORAL at 07:01

## 2020-11-21 RX ADMIN — Medication 81 MILLIGRAM(S): at 11:25

## 2020-11-21 RX ADMIN — Medication 3 MILLILITER(S): at 11:26

## 2020-11-21 RX ADMIN — HEPARIN SODIUM 7500 UNIT(S): 5000 INJECTION INTRAVENOUS; SUBCUTANEOUS at 14:54

## 2020-11-21 RX ADMIN — INSULIN GLARGINE 80 UNIT(S): 100 INJECTION, SOLUTION SUBCUTANEOUS at 08:40

## 2020-11-21 RX ADMIN — Medication 1 APPLICATION(S): at 18:36

## 2020-11-21 RX ADMIN — AMPICILLIN SODIUM AND SULBACTAM SODIUM 200 GRAM(S): 250; 125 INJECTION, POWDER, FOR SUSPENSION INTRAMUSCULAR; INTRAVENOUS at 14:54

## 2020-11-21 RX ADMIN — Medication 22 UNIT(S): at 12:33

## 2020-11-21 RX ADMIN — Medication 15 UNIT(S): at 08:40

## 2020-11-21 RX ADMIN — OXYCODONE HYDROCHLORIDE 5 MILLIGRAM(S): 5 TABLET ORAL at 19:12

## 2020-11-21 RX ADMIN — OXYCODONE HYDROCHLORIDE 5 MILLIGRAM(S): 5 TABLET ORAL at 11:20

## 2020-11-21 RX ADMIN — Medication 1 DROP(S): at 18:36

## 2020-11-21 RX ADMIN — ATORVASTATIN CALCIUM 80 MILLIGRAM(S): 80 TABLET, FILM COATED ORAL at 21:25

## 2020-11-21 RX ADMIN — OXYCODONE HYDROCHLORIDE 5 MILLIGRAM(S): 5 TABLET ORAL at 18:42

## 2020-11-21 RX ADMIN — Medication 1 DROP(S): at 06:51

## 2020-11-21 RX ADMIN — OXYCODONE HYDROCHLORIDE 5 MILLIGRAM(S): 5 TABLET ORAL at 11:58

## 2020-11-21 RX ADMIN — Medication 1 TABLET(S): at 11:26

## 2020-11-21 RX ADMIN — Medication 3 MILLILITER(S): at 06:51

## 2020-11-21 RX ADMIN — Medication 0.5 MILLIGRAM(S): at 21:25

## 2020-11-21 RX ADMIN — AMPICILLIN SODIUM AND SULBACTAM SODIUM 200 GRAM(S): 250; 125 INJECTION, POWDER, FOR SUSPENSION INTRAMUSCULAR; INTRAVENOUS at 06:50

## 2020-11-21 RX ADMIN — OXYCODONE HYDROCHLORIDE 80 MILLIGRAM(S): 5 TABLET ORAL at 06:51

## 2020-11-21 RX ADMIN — Medication 1 APPLICATION(S): at 06:52

## 2020-11-21 RX ADMIN — HEPARIN SODIUM 7500 UNIT(S): 5000 INJECTION INTRAVENOUS; SUBCUTANEOUS at 21:25

## 2020-11-21 RX ADMIN — Medication 1 APPLICATION(S): at 11:26

## 2020-11-21 RX ADMIN — OXYCODONE HYDROCHLORIDE 5 MILLIGRAM(S): 5 TABLET ORAL at 21:25

## 2020-11-21 RX ADMIN — SENNA PLUS 2 TABLET(S): 8.6 TABLET ORAL at 21:25

## 2020-11-21 RX ADMIN — OXYCODONE HYDROCHLORIDE 80 MILLIGRAM(S): 5 TABLET ORAL at 17:28

## 2020-11-21 RX ADMIN — Medication 40 MILLIGRAM(S): at 06:51

## 2020-11-21 RX ADMIN — OXYCODONE HYDROCHLORIDE 80 MILLIGRAM(S): 5 TABLET ORAL at 17:58

## 2020-11-21 RX ADMIN — AMPICILLIN SODIUM AND SULBACTAM SODIUM 200 GRAM(S): 250; 125 INJECTION, POWDER, FOR SUSPENSION INTRAMUSCULAR; INTRAVENOUS at 21:37

## 2020-11-21 RX ADMIN — INSULIN GLARGINE 70 UNIT(S): 100 INJECTION, SOLUTION SUBCUTANEOUS at 21:37

## 2020-11-21 RX ADMIN — HEPARIN SODIUM 7500 UNIT(S): 5000 INJECTION INTRAVENOUS; SUBCUTANEOUS at 06:50

## 2020-11-21 RX ADMIN — Medication 15 UNIT(S): at 17:17

## 2020-11-21 RX ADMIN — NYSTATIN CREAM 1 APPLICATION(S): 100000 CREAM TOPICAL at 17:21

## 2020-11-21 NOTE — PROGRESS NOTE ADULT - SUBJECTIVE AND OBJECTIVE BOX
Patient is a 65y old  Female who presents with a chief complaint of Right foot infection (19 Nov 2020 16:39)      SUBJECTIVE / OVERNIGHT EVENTS: no new events    MEDICATIONS  (STANDING):  albuterol/ipratropium for Nebulization. 3 milliLiter(s) Nebulizer every 6 hours  ampicillin/sulbactam  IVPB 3 Gram(s) IV Intermittent every 8 hours  AQUAPHOR (petrolatum Ointment) 1 Application(s) Topical two times a day  artificial tears (preservative free) Ophthalmic Solution 1 Drop(s) Both EYES every 6 hours  aspirin enteric coated 81 milliGRAM(s) Oral daily  atorvastatin 80 milliGRAM(s) Oral at bedtime  chlorhexidine 2% Cloths 1 Application(s) Topical daily  collagenase Ointment 1 Application(s) Topical daily  dextrose 5%. 1000 milliLiter(s) (50 mL/Hr) IV Continuous <Continuous>  dextrose 50% Injectable 12.5 Gram(s) IV Push once  dextrose 50% Injectable 25 Gram(s) IV Push once  dextrose 50% Injectable 25 Gram(s) IV Push once  furosemide    Tablet 40 milliGRAM(s) Oral daily  heparin   Injectable 7500 Unit(s) SubCutaneous every 8 hours  insulin glargine Injectable (LANTUS) 80 Unit(s) SubCutaneous every morning  insulin glargine Injectable (LANTUS) 80 Unit(s) SubCutaneous at bedtime  insulin lispro (ADMELOG) corrective regimen sliding scale   SubCutaneous at bedtime  insulin lispro (ADMELOG) corrective regimen sliding scale   SubCutaneous three times a day before meals  insulin lispro Injectable (ADMELOG) 22 Unit(s) SubCutaneous three times a day before meals  multivitamin 1 Tablet(s) Oral daily  nystatin Powder 1 Application(s) Topical two times a day  oxyCODONE  ER Tablet 80 milliGRAM(s) Oral every 12 hours  polyethylene glycol 3350 17 Gram(s) Oral daily  senna 2 Tablet(s) Oral at bedtime    MEDICATIONS  (PRN):  clonazePAM  Tablet 0.5 milliGRAM(s) Oral at bedtime PRN ancxiety  dextrose 40% Gel 15 Gram(s) Oral once PRN Blood Glucose LESS THAN 70 milliGRAM(s)/deciliter  glucagon  Injectable 1 milliGRAM(s) IntraMuscular once PRN Glucose LESS THAN 70 milligrams/deciliter  oxyCODONE    IR 5 milliGRAM(s) Oral every 8 hours PRN Breakthrough Pain      T(C): 36.3 (11-21-20 @ 08:25), Max: 37 (11-20-20 @ 23:45)  HR: 86 (11-21-20 @ 08:25) (74 - 86)  BP: 155/64 (11-21-20 @ 08:25) (125/59 - 155/64)  RR: 18 (11-21-20 @ 08:25) (18 - 18)  SpO2: 96% (11-21-20 @ 08:25) (93% - 96%)  CAPILLARY BLOOD GLUCOSE      POCT Blood Glucose.: 139 mg/dL (21 Nov 2020 12:04)  POCT Blood Glucose.: 86 mg/dL (21 Nov 2020 08:00)  POCT Blood Glucose.: 100 mg/dL (20 Nov 2020 21:49)  POCT Blood Glucose.: 121 mg/dL (20 Nov 2020 17:00)  POCT Blood Glucose.: 112 mg/dL (20 Nov 2020 12:19)    I&O's Summary    20 Nov 2020 07:01  -  21 Nov 2020 07:00  --------------------------------------------------------  IN: 340 mL / OUT: 2800 mL / NET: -2460 mL    21 Nov 2020 07:01  -  21 Nov 2020 12:12  --------------------------------------------------------  IN: 240 mL / OUT: 0 mL / NET: 240 mL        PHYSICAL EXAM:  GENERAL: NAD  HEAD:  Atraumatic, Normocephalic  EYES: EOMI, PERRLA, conjunctiva and sclera clear  NECK: Supple, No JVD  CHEST/LUNG: Clear to auscultation bilaterally; No wheeze  HEART: Regular rate and rhythm; No murmurs, rubs, or gallops  ABDOMEN: Soft, Nontender, Nondistended; Bowel sounds present  EXTREMITIES:  right foot wounded wrapped, surrounding area free of cellulitic changes  PSYCH: AAOx3    LABS:                        13.3   10.01 )-----------( 235      ( 20 Nov 2020 10:39 )             43.0     11-20    142  |  102  |  29<H>  ----------------------------<  135<H>  4.2   |  26  |  1.60<H>    Ca    10.0      20 Nov 2020 10:38                RADIOLOGY & ADDITIONAL TESTS:    Imaging Personally Reviewed:    Consultant(s) Notes Reviewed:      Care Discussed with Consultants/Other Providers:

## 2020-11-22 LAB
ANION GAP SERPL CALC-SCNC: 14 MMOL/L — SIGNIFICANT CHANGE UP (ref 5–17)
BUN SERPL-MCNC: 32 MG/DL — HIGH (ref 7–23)
CALCIUM SERPL-MCNC: 10 MG/DL — SIGNIFICANT CHANGE UP (ref 8.4–10.5)
CHLORIDE SERPL-SCNC: 102 MMOL/L — SIGNIFICANT CHANGE UP (ref 96–108)
CO2 SERPL-SCNC: 26 MMOL/L — SIGNIFICANT CHANGE UP (ref 22–31)
CREAT SERPL-MCNC: 1.73 MG/DL — HIGH (ref 0.5–1.3)
GLUCOSE BLDC GLUCOMTR-MCNC: 121 MG/DL — HIGH (ref 70–99)
GLUCOSE BLDC GLUCOMTR-MCNC: 122 MG/DL — HIGH (ref 70–99)
GLUCOSE BLDC GLUCOMTR-MCNC: 150 MG/DL — HIGH (ref 70–99)
GLUCOSE BLDC GLUCOMTR-MCNC: 176 MG/DL — HIGH (ref 70–99)
GLUCOSE SERPL-MCNC: 187 MG/DL — HIGH (ref 70–99)
POTASSIUM SERPL-MCNC: 4.4 MMOL/L — SIGNIFICANT CHANGE UP (ref 3.5–5.3)
POTASSIUM SERPL-SCNC: 4.4 MMOL/L — SIGNIFICANT CHANGE UP (ref 3.5–5.3)
SODIUM SERPL-SCNC: 142 MMOL/L — SIGNIFICANT CHANGE UP (ref 135–145)

## 2020-11-22 PROCEDURE — 99232 SBSQ HOSP IP/OBS MODERATE 35: CPT | Mod: GC

## 2020-11-22 RX ADMIN — OXYCODONE HYDROCHLORIDE 80 MILLIGRAM(S): 5 TABLET ORAL at 17:43

## 2020-11-22 RX ADMIN — Medication 3 MILLILITER(S): at 11:55

## 2020-11-22 RX ADMIN — AMPICILLIN SODIUM AND SULBACTAM SODIUM 200 GRAM(S): 250; 125 INJECTION, POWDER, FOR SUSPENSION INTRAMUSCULAR; INTRAVENOUS at 14:24

## 2020-11-22 RX ADMIN — Medication 1 DROP(S): at 12:13

## 2020-11-22 RX ADMIN — OXYCODONE HYDROCHLORIDE 5 MILLIGRAM(S): 5 TABLET ORAL at 17:20

## 2020-11-22 RX ADMIN — Medication 0.5 MILLIGRAM(S): at 21:50

## 2020-11-22 RX ADMIN — NYSTATIN CREAM 1 APPLICATION(S): 100000 CREAM TOPICAL at 17:25

## 2020-11-22 RX ADMIN — OXYCODONE HYDROCHLORIDE 5 MILLIGRAM(S): 5 TABLET ORAL at 09:17

## 2020-11-22 RX ADMIN — HEPARIN SODIUM 7500 UNIT(S): 5000 INJECTION INTRAVENOUS; SUBCUTANEOUS at 21:49

## 2020-11-22 RX ADMIN — OXYCODONE HYDROCHLORIDE 80 MILLIGRAM(S): 5 TABLET ORAL at 17:13

## 2020-11-22 RX ADMIN — OXYCODONE HYDROCHLORIDE 80 MILLIGRAM(S): 5 TABLET ORAL at 06:21

## 2020-11-22 RX ADMIN — ATORVASTATIN CALCIUM 80 MILLIGRAM(S): 80 TABLET, FILM COATED ORAL at 21:50

## 2020-11-22 RX ADMIN — Medication 15 UNIT(S): at 11:49

## 2020-11-22 RX ADMIN — Medication 40 MILLIGRAM(S): at 06:21

## 2020-11-22 RX ADMIN — HEPARIN SODIUM 7500 UNIT(S): 5000 INJECTION INTRAVENOUS; SUBCUTANEOUS at 14:25

## 2020-11-22 RX ADMIN — AMPICILLIN SODIUM AND SULBACTAM SODIUM 200 GRAM(S): 250; 125 INJECTION, POWDER, FOR SUSPENSION INTRAMUSCULAR; INTRAVENOUS at 06:21

## 2020-11-22 RX ADMIN — SENNA PLUS 2 TABLET(S): 8.6 TABLET ORAL at 21:50

## 2020-11-22 RX ADMIN — OXYCODONE HYDROCHLORIDE 5 MILLIGRAM(S): 5 TABLET ORAL at 17:50

## 2020-11-22 RX ADMIN — Medication 15 UNIT(S): at 08:07

## 2020-11-22 RX ADMIN — OXYCODONE HYDROCHLORIDE 5 MILLIGRAM(S): 5 TABLET ORAL at 09:47

## 2020-11-22 RX ADMIN — AMPICILLIN SODIUM AND SULBACTAM SODIUM 200 GRAM(S): 250; 125 INJECTION, POWDER, FOR SUSPENSION INTRAMUSCULAR; INTRAVENOUS at 21:50

## 2020-11-22 RX ADMIN — Medication 15 UNIT(S): at 17:13

## 2020-11-22 RX ADMIN — Medication 1 APPLICATION(S): at 17:23

## 2020-11-22 RX ADMIN — HEPARIN SODIUM 7500 UNIT(S): 5000 INJECTION INTRAVENOUS; SUBCUTANEOUS at 06:20

## 2020-11-22 RX ADMIN — CHLORHEXIDINE GLUCONATE 1 APPLICATION(S): 213 SOLUTION TOPICAL at 12:13

## 2020-11-22 RX ADMIN — Medication 1 APPLICATION(S): at 06:22

## 2020-11-22 RX ADMIN — Medication 3 MILLILITER(S): at 06:21

## 2020-11-22 RX ADMIN — Medication 81 MILLIGRAM(S): at 11:54

## 2020-11-22 RX ADMIN — Medication 2: at 11:49

## 2020-11-22 RX ADMIN — Medication 1 DROP(S): at 17:23

## 2020-11-22 RX ADMIN — Medication 1 TABLET(S): at 11:54

## 2020-11-22 RX ADMIN — INSULIN GLARGINE 70 UNIT(S): 100 INJECTION, SOLUTION SUBCUTANEOUS at 21:50

## 2020-11-22 RX ADMIN — Medication 1 APPLICATION(S): at 11:54

## 2020-11-22 RX ADMIN — Medication 1 DROP(S): at 06:21

## 2020-11-22 RX ADMIN — INSULIN GLARGINE 60 UNIT(S): 100 INJECTION, SOLUTION SUBCUTANEOUS at 08:07

## 2020-11-22 NOTE — PROGRESS NOTE ADULT - PROBLEM SELECTOR PLAN 1
History of chronic wound and charcot foot with concern for overlying infection due to new erythema and reported drainage  - c/w Unasyn, per ID  - Podiatry consult - debrided at bedside and cultures obtained. Follow up wound cultures - Staph aureus and contaminant.   - DVT US - unable to visualize R common femoral, but otherwise negative  - repeat arterial studies ordered which show mild decrease in flow, appreciate vascular recs   -MRI foot not done as machine unable to accommodate patient, can get CT of the foot with contrast, once cleared by renal (consult placed) - patient however now refuses IV contrast over kidney concerns.   -NM bone scan done, concerning for midfoot osteomyelitis, now s/p bone biopsy  -f/u bone biopsy results which will determine the total length of abx treatment  -prelim bone biopsy cultures with no growth. Awaiting bone biopsy PATH report as well. If both are neg, less likely to treat with IV abx long term. Will need that information before decision regarding PICC and IV abx can be made.

## 2020-11-22 NOTE — PROGRESS NOTE ADULT - SUBJECTIVE AND OBJECTIVE BOX
Patient is a 65y old  Female who presents with a chief complaint of Right foot infection (19 Nov 2020 16:39)      SUBJECTIVE / OVERNIGHT EVENTS: no new events    MEDICATIONS  (STANDING):  albuterol/ipratropium for Nebulization. 3 milliLiter(s) Nebulizer every 6 hours  ampicillin/sulbactam  IVPB 3 Gram(s) IV Intermittent every 8 hours  AQUAPHOR (petrolatum Ointment) 1 Application(s) Topical two times a day  artificial tears (preservative free) Ophthalmic Solution 1 Drop(s) Both EYES every 6 hours  aspirin enteric coated 81 milliGRAM(s) Oral daily  atorvastatin 80 milliGRAM(s) Oral at bedtime  chlorhexidine 2% Cloths 1 Application(s) Topical daily  collagenase Ointment 1 Application(s) Topical daily  dextrose 5%. 1000 milliLiter(s) (50 mL/Hr) IV Continuous <Continuous>  dextrose 50% Injectable 12.5 Gram(s) IV Push once  dextrose 50% Injectable 25 Gram(s) IV Push once  dextrose 50% Injectable 25 Gram(s) IV Push once  furosemide    Tablet 40 milliGRAM(s) Oral daily  heparin   Injectable 7500 Unit(s) SubCutaneous every 8 hours  insulin glargine Injectable (LANTUS) 80 Unit(s) SubCutaneous every morning  insulin glargine Injectable (LANTUS) 80 Unit(s) SubCutaneous at bedtime  insulin lispro (ADMELOG) corrective regimen sliding scale   SubCutaneous at bedtime  insulin lispro (ADMELOG) corrective regimen sliding scale   SubCutaneous three times a day before meals  insulin lispro Injectable (ADMELOG) 22 Unit(s) SubCutaneous three times a day before meals  multivitamin 1 Tablet(s) Oral daily  nystatin Powder 1 Application(s) Topical two times a day  oxyCODONE  ER Tablet 80 milliGRAM(s) Oral every 12 hours  polyethylene glycol 3350 17 Gram(s) Oral daily  senna 2 Tablet(s) Oral at bedtime    MEDICATIONS  (PRN):  clonazePAM  Tablet 0.5 milliGRAM(s) Oral at bedtime PRN ancxiety  dextrose 40% Gel 15 Gram(s) Oral once PRN Blood Glucose LESS THAN 70 milliGRAM(s)/deciliter  glucagon  Injectable 1 milliGRAM(s) IntraMuscular once PRN Glucose LESS THAN 70 milligrams/deciliter  oxyCODONE    IR 5 milliGRAM(s) Oral every 8 hours PRN Breakthrough Pain      Vital Signs Last 24 Hrs  T(C): 36.5 (22 Nov 2020 08:42), Max: 37 (21 Nov 2020 21:27)  T(F): 97.7 (22 Nov 2020 08:42), Max: 98.6 (21 Nov 2020 21:27)  HR: 84 (22 Nov 2020 08:42) (72 - 84)  BP: 146/67 (22 Nov 2020 08:42) (130/67 - 150/51)  BP(mean): --  RR: 18 (22 Nov 2020 08:42) (16 - 18)  SpO2: 95% (22 Nov 2020 08:42) (93% - 100%)    POCT Blood Glucose.: 139 mg/dL (21 Nov 2020 12:04)  POCT Blood Glucose.: 86 mg/dL (21 Nov 2020 08:00)  POCT Blood Glucose.: 100 mg/dL (20 Nov 2020 21:49)  POCT Blood Glucose.: 121 mg/dL (20 Nov 2020 17:00)  POCT Blood Glucose.: 112 mg/dL (20 Nov 2020 12:19)    I&O's Summary    20 Nov 2020 07:01  -  21 Nov 2020 07:00  --------------------------------------------------------  IN: 340 mL / OUT: 2800 mL / NET: -2460 mL    21 Nov 2020 07:01  -  21 Nov 2020 12:12  --------------------------------------------------------  IN: 240 mL / OUT: 0 mL / NET: 240 mL        PHYSICAL EXAM:  GENERAL: NAD  HEAD:  Atraumatic, Normocephalic  EYES: EOMI, PERRLA, conjunctiva and sclera clear  NECK: Supple, No JVD  CHEST/LUNG: Clear to auscultation bilaterally; No wheeze  HEART: Regular rate and rhythm; No murmurs, rubs, or gallops  ABDOMEN: Soft, Nontender, Nondistended; Bowel sounds present  EXTREMITIES:  right foot wounded wrapped, surrounding area free of cellulitic changes  PSYCH: AAOx3    LABS:                        13.3   10.01 )-----------( 235      ( 20 Nov 2020 10:39 )             43.0     11-20    142  |  102  |  29<H>  ----------------------------<  135<H>  4.2   |  26  |  1.60<H>    Ca    10.0      20 Nov 2020 10:38                RADIOLOGY & ADDITIONAL TESTS:    Imaging Personally Reviewed:    Consultant(s) Notes Reviewed:      Care Discussed with Consultants/Other Providers:

## 2020-11-23 LAB
ANION GAP SERPL CALC-SCNC: 15 MMOL/L — SIGNIFICANT CHANGE UP (ref 5–17)
BUN SERPL-MCNC: 32 MG/DL — HIGH (ref 7–23)
CALCIUM SERPL-MCNC: 9.8 MG/DL — SIGNIFICANT CHANGE UP (ref 8.4–10.5)
CHLORIDE SERPL-SCNC: 102 MMOL/L — SIGNIFICANT CHANGE UP (ref 96–108)
CO2 SERPL-SCNC: 21 MMOL/L — LOW (ref 22–31)
CREAT SERPL-MCNC: 1.69 MG/DL — HIGH (ref 0.5–1.3)
CULTURE RESULTS: SIGNIFICANT CHANGE UP
GLUCOSE BLDC GLUCOMTR-MCNC: 109 MG/DL — HIGH (ref 70–99)
GLUCOSE BLDC GLUCOMTR-MCNC: 164 MG/DL — HIGH (ref 70–99)
GLUCOSE BLDC GLUCOMTR-MCNC: 171 MG/DL — HIGH (ref 70–99)
GLUCOSE BLDC GLUCOMTR-MCNC: 98 MG/DL — SIGNIFICANT CHANGE UP (ref 70–99)
GLUCOSE SERPL-MCNC: 107 MG/DL — HIGH (ref 70–99)
HCT VFR BLD CALC: 44.3 % — SIGNIFICANT CHANGE UP (ref 34.5–45)
HGB BLD-MCNC: 13.7 G/DL — SIGNIFICANT CHANGE UP (ref 11.5–15.5)
MCHC RBC-ENTMCNC: 29.3 PG — SIGNIFICANT CHANGE UP (ref 27–34)
MCHC RBC-ENTMCNC: 30.9 GM/DL — LOW (ref 32–36)
MCV RBC AUTO: 94.9 FL — SIGNIFICANT CHANGE UP (ref 80–100)
NRBC # BLD: 0 /100 WBCS — SIGNIFICANT CHANGE UP (ref 0–0)
PLATELET # BLD AUTO: 226 K/UL — SIGNIFICANT CHANGE UP (ref 150–400)
POTASSIUM SERPL-MCNC: 4.7 MMOL/L — SIGNIFICANT CHANGE UP (ref 3.5–5.3)
POTASSIUM SERPL-SCNC: 4.7 MMOL/L — SIGNIFICANT CHANGE UP (ref 3.5–5.3)
RBC # BLD: 4.67 M/UL — SIGNIFICANT CHANGE UP (ref 3.8–5.2)
RBC # FLD: 13.8 % — SIGNIFICANT CHANGE UP (ref 10.3–14.5)
SODIUM SERPL-SCNC: 138 MMOL/L — SIGNIFICANT CHANGE UP (ref 135–145)
SPECIMEN SOURCE: SIGNIFICANT CHANGE UP
WBC # BLD: 9.25 K/UL — SIGNIFICANT CHANGE UP (ref 3.8–10.5)
WBC # FLD AUTO: 9.25 K/UL — SIGNIFICANT CHANGE UP (ref 3.8–10.5)

## 2020-11-23 PROCEDURE — 99232 SBSQ HOSP IP/OBS MODERATE 35: CPT

## 2020-11-23 PROCEDURE — 99232 SBSQ HOSP IP/OBS MODERATE 35: CPT | Mod: GC

## 2020-11-23 RX ORDER — CEFAZOLIN SODIUM 1 G
2000 VIAL (EA) INJECTION EVERY 8 HOURS
Refills: 0 | Status: DISCONTINUED | OUTPATIENT
Start: 2020-11-23 | End: 2020-11-25

## 2020-11-23 RX ORDER — ACETAMINOPHEN 500 MG
650 TABLET ORAL ONCE
Refills: 0 | Status: DISCONTINUED | OUTPATIENT
Start: 2020-11-23 | End: 2020-11-23

## 2020-11-23 RX ORDER — OXYCODONE HYDROCHLORIDE 5 MG/1
5 TABLET ORAL ONCE
Refills: 0 | Status: DISCONTINUED | OUTPATIENT
Start: 2020-11-23 | End: 2020-11-23

## 2020-11-23 RX ORDER — CEFAZOLIN SODIUM 1 G
2000 VIAL (EA) INJECTION EVERY 12 HOURS
Refills: 0 | Status: DISCONTINUED | OUTPATIENT
Start: 2020-11-23 | End: 2020-11-23

## 2020-11-23 RX ADMIN — OXYCODONE HYDROCHLORIDE 80 MILLIGRAM(S): 5 TABLET ORAL at 06:14

## 2020-11-23 RX ADMIN — SENNA PLUS 2 TABLET(S): 8.6 TABLET ORAL at 21:22

## 2020-11-23 RX ADMIN — Medication 1 TABLET(S): at 12:41

## 2020-11-23 RX ADMIN — Medication 2: at 12:39

## 2020-11-23 RX ADMIN — Medication 3 MILLILITER(S): at 00:02

## 2020-11-23 RX ADMIN — Medication 81 MILLIGRAM(S): at 12:42

## 2020-11-23 RX ADMIN — NYSTATIN CREAM 1 APPLICATION(S): 100000 CREAM TOPICAL at 06:15

## 2020-11-23 RX ADMIN — AMPICILLIN SODIUM AND SULBACTAM SODIUM 200 GRAM(S): 250; 125 INJECTION, POWDER, FOR SUSPENSION INTRAMUSCULAR; INTRAVENOUS at 06:13

## 2020-11-23 RX ADMIN — OXYCODONE HYDROCHLORIDE 80 MILLIGRAM(S): 5 TABLET ORAL at 17:36

## 2020-11-23 RX ADMIN — INSULIN GLARGINE 60 UNIT(S): 100 INJECTION, SOLUTION SUBCUTANEOUS at 08:01

## 2020-11-23 RX ADMIN — Medication 100 MILLIGRAM(S): at 21:21

## 2020-11-23 RX ADMIN — Medication 1 DROP(S): at 00:03

## 2020-11-23 RX ADMIN — OXYCODONE HYDROCHLORIDE 5 MILLIGRAM(S): 5 TABLET ORAL at 16:15

## 2020-11-23 RX ADMIN — HEPARIN SODIUM 7500 UNIT(S): 5000 INJECTION INTRAVENOUS; SUBCUTANEOUS at 21:23

## 2020-11-23 RX ADMIN — Medication 1 APPLICATION(S): at 17:37

## 2020-11-23 RX ADMIN — Medication 3 MILLILITER(S): at 17:35

## 2020-11-23 RX ADMIN — Medication 3 MILLILITER(S): at 06:13

## 2020-11-23 RX ADMIN — OXYCODONE HYDROCHLORIDE 5 MILLIGRAM(S): 5 TABLET ORAL at 08:43

## 2020-11-23 RX ADMIN — Medication 1 DROP(S): at 17:36

## 2020-11-23 RX ADMIN — HEPARIN SODIUM 7500 UNIT(S): 5000 INJECTION INTRAVENOUS; SUBCUTANEOUS at 13:17

## 2020-11-23 RX ADMIN — HEPARIN SODIUM 7500 UNIT(S): 5000 INJECTION INTRAVENOUS; SUBCUTANEOUS at 06:13

## 2020-11-23 RX ADMIN — POLYETHYLENE GLYCOL 3350 17 GRAM(S): 17 POWDER, FOR SOLUTION ORAL at 12:41

## 2020-11-23 RX ADMIN — OXYCODONE HYDROCHLORIDE 5 MILLIGRAM(S): 5 TABLET ORAL at 08:02

## 2020-11-23 RX ADMIN — OXYCODONE HYDROCHLORIDE 5 MILLIGRAM(S): 5 TABLET ORAL at 15:38

## 2020-11-23 RX ADMIN — ATORVASTATIN CALCIUM 80 MILLIGRAM(S): 80 TABLET, FILM COATED ORAL at 21:21

## 2020-11-23 RX ADMIN — Medication 40 MILLIGRAM(S): at 06:13

## 2020-11-23 RX ADMIN — Medication 15 UNIT(S): at 17:36

## 2020-11-23 RX ADMIN — OXYCODONE HYDROCHLORIDE 80 MILLIGRAM(S): 5 TABLET ORAL at 18:15

## 2020-11-23 RX ADMIN — Medication 1 DROP(S): at 12:41

## 2020-11-23 RX ADMIN — Medication 1 APPLICATION(S): at 06:15

## 2020-11-23 RX ADMIN — Medication 1 APPLICATION(S): at 12:41

## 2020-11-23 RX ADMIN — Medication 15 UNIT(S): at 12:40

## 2020-11-23 RX ADMIN — INSULIN GLARGINE 70 UNIT(S): 100 INJECTION, SOLUTION SUBCUTANEOUS at 21:24

## 2020-11-23 RX ADMIN — Medication 1 DROP(S): at 06:13

## 2020-11-23 RX ADMIN — CHLORHEXIDINE GLUCONATE 1 APPLICATION(S): 213 SOLUTION TOPICAL at 13:18

## 2020-11-23 RX ADMIN — Medication 0.5 MILLIGRAM(S): at 21:53

## 2020-11-23 RX ADMIN — Medication 15 UNIT(S): at 08:01

## 2020-11-23 RX ADMIN — Medication 3 MILLILITER(S): at 12:40

## 2020-11-23 RX ADMIN — Medication 100 MILLIGRAM(S): at 14:03

## 2020-11-23 RX ADMIN — NYSTATIN CREAM 1 APPLICATION(S): 100000 CREAM TOPICAL at 17:36

## 2020-11-23 NOTE — PROGRESS NOTE ADULT - SUBJECTIVE AND OBJECTIVE BOX
Follow Up:  diabetic foot infection and cellulitis    Interval History:  bone cx is negative still awaiting path    ROS:      All other systems negative    Constitutional: no fever, no chills  Cardiovascular:  no chest pain, no palpitation  Respiratory:  no SOB, no cough  GI:  no abd pain, no vomiting, no diarrhea  urinary: no dysuria, no hematuria, no flank pain  musculoskeletal: slight improvement in R foot redness and pain,  some pain and erythema at the L lateral leg  skin:  no rash, R foot wound   neurology:  no headache, no seizure      Allergies  adhesives (Rash)  Bactrim (Flushing)  latex (Rash)  wool- rash, itch (Other)        ANTIMICROBIALS:  ampicillin/sulbactam  IVPB 3 every 8 hours      OTHER MEDS:  albuterol/ipratropium for Nebulization. 3 milliLiter(s) Nebulizer every 6 hours  AQUAPHOR (petrolatum Ointment) 1 Application(s) Topical two times a day  artificial tears (preservative free) Ophthalmic Solution 1 Drop(s) Both EYES every 6 hours  aspirin enteric coated 81 milliGRAM(s) Oral daily  atorvastatin 80 milliGRAM(s) Oral at bedtime  chlorhexidine 2% Cloths 1 Application(s) Topical daily  clonazePAM  Tablet 0.5 milliGRAM(s) Oral at bedtime PRN  collagenase Ointment 1 Application(s) Topical daily  dextrose 40% Gel 15 Gram(s) Oral once PRN  dextrose 5%. 1000 milliLiter(s) IV Continuous <Continuous>  dextrose 50% Injectable 12.5 Gram(s) IV Push once  dextrose 50% Injectable 25 Gram(s) IV Push once  dextrose 50% Injectable 25 Gram(s) IV Push once  furosemide    Tablet 40 milliGRAM(s) Oral daily  glucagon  Injectable 1 milliGRAM(s) IntraMuscular once PRN  heparin   Injectable 7500 Unit(s) SubCutaneous every 8 hours  insulin glargine Injectable (LANTUS) 60 Unit(s) SubCutaneous every morning  insulin glargine Injectable (LANTUS) 70 Unit(s) SubCutaneous at bedtime  insulin lispro (ADMELOG) corrective regimen sliding scale   SubCutaneous at bedtime  insulin lispro (ADMELOG) corrective regimen sliding scale   SubCutaneous three times a day before meals  insulin lispro Injectable (ADMELOG) 15 Unit(s) SubCutaneous three times a day before meals  multivitamin 1 Tablet(s) Oral daily  nystatin Powder 1 Application(s) Topical two times a day  oxyCODONE    IR 5 milliGRAM(s) Oral every 8 hours PRN  oxyCODONE  ER Tablet 80 milliGRAM(s) Oral every 12 hours  polyethylene glycol 3350 17 Gram(s) Oral daily  senna 2 Tablet(s) Oral at bedtime      Vital Signs Last 24 Hrs  T(C): 36.5 (23 Nov 2020 07:54), Max: 37.1 (22 Nov 2020 23:59)  T(F): 97.7 (23 Nov 2020 07:54), Max: 98.7 (22 Nov 2020 23:59)  HR: 74 (23 Nov 2020 07:54) (74 - 79)  BP: 105/51 (23 Nov 2020 07:54) (105/51 - 150/65)  BP(mean): --  RR: 18 (23 Nov 2020 07:54) (18 - 18)  SpO2: 96% (23 Nov 2020 07:54) (95% - 111%)    Physical Exam:  General:    NAD,  non toxic  Cardio:     regular S1, S2,  no murmur  Respiratory:    clear b/l,    no wheezing  abd:     soft,   BS +,   no tenderness  :   no CVAT,  no suprapubic tenderness,   no  de la torre  Musculoskeletal: R plantar foot wound with improved erythema on the dorsal and plantar surface   L lateral LE slightly erythematous on the chronic lesion  vascular: no phlebitis  Skin:    no rash                        13.7   9.25  )-----------( 226      ( 23 Nov 2020 11:27 )             44.3       11-23    138  |  102  |  32<H>  ----------------------------<  107<H>  4.7   |  21<L>  |  1.69<H>    Ca    9.8      23 Nov 2020 08:52            MICROBIOLOGY:  v  .Tissue Right foot bone biopsy  11-18-20   No growth at 5 days  --    No polymorphonuclear cells seen per low power field  No organisms seen per oil power field      .Abscess right foot wound  11-10-20   Culture yields >4 types of aerobic and/or anaerobic bacteria  Call client services within 7 days if further workup is clinically  indicated. Culture includes  Moderate Staphylococcus aureus  --  Staphylococcus aureus      .Blood Blood-Peripheral  11-10-20   No Growth Final  --  --                RADIOLOGY:  Images independently visualized and reviewed personally, findings as below  < from: NM Bone Marrow Imaging Limited (11.17.20 @ 12:19) >  IMPRESSION:  Compared to combined Indium-111 labeled leukocyte study and marrow scan from 7/20/2016: New labeled wbc uptake appears more extensive than sulfur colloid uptake in right midfoot, osteomyelitis cannot be excluded.  Study limited due to patient's body habitus and inability to fully cooperate.        < end of copied text >  < from: Xray Foot AP + Lateral + Oblique, Right (11.09.20 @ 17:43) >  IMPRESSION:  1. No soft tissue gas or acute osseous destruction is appreciated.  2. Charcot changes of the midfoot are again seen.  3. MRI may be helpful for further evaluation of osteomyelitis as warranted.

## 2020-11-23 NOTE — PROGRESS NOTE ADULT - SUBJECTIVE AND OBJECTIVE BOX
Patient is a 65y old  Female who presents with a chief complaint of Right foot infection (19 Nov 2020 16:39)       INTERVAL HPI/OVERNIGHT EVENTS:  Patient seen and evaluated at bedside.  Pt is resting comfortable in NAD. Denies N/V/F/C.     Allergies    adhesives (Rash)  Bactrim (Flushing)  latex (Rash)  wool- rash, itch (Other)    Intolerances        Vital Signs Last 24 Hrs  T(C): 36.5 (23 Nov 2020 07:54), Max: 37.1 (22 Nov 2020 23:59)  T(F): 97.7 (23 Nov 2020 07:54), Max: 98.7 (22 Nov 2020 23:59)  HR: 74 (23 Nov 2020 07:54) (73 - 84)  BP: 105/51 (23 Nov 2020 07:54) (105/51 - 150/65)  BP(mean): --  RR: 18 (23 Nov 2020 07:54) (18 - 18)  SpO2: 96% (23 Nov 2020 07:54) (95% - 111%)    LABS:    11-22    142  |  102  |  32<H>  ----------------------------<  187<H>  4.4   |  26  |  1.73<H>    Ca    10.0      22 Nov 2020 09:40          CAPILLARY BLOOD GLUCOSE      POCT Blood Glucose.: 109 mg/dL (23 Nov 2020 07:42)  POCT Blood Glucose.: 121 mg/dL (22 Nov 2020 21:07)  POCT Blood Glucose.: 122 mg/dL (22 Nov 2020 16:54)  POCT Blood Glucose.: 176 mg/dL (22 Nov 2020 11:44)      Lower Extremity Physical Exam:  Vascular: DP/PT 1/4 B/L, CFT <3 seconds B/L, Temperature gradient warm to cool B/L  Neuro: Epicritic sensation diminished to the level of midfoot B/L  Musculoskeletal/Ortho: charcot foot deformity R, non-ambi  Skin: Right plantar midfoot wound to subq, no purulence, no fluctuance, no malodor, hyperkeratotic borders, improved erythema of R dorsal and plantar forefoot ; now with L foot showing signs of erythema noted to forefoot, L forefoot warm to touch, no open wounds to L foot    Now s/p R foot bone biopsy (DOS 11/18/20)  - No strikethrough on dressing  - Sutures intact, no dehiscence, no signs of infection    RADIOLOGY & ADDITIONAL TESTS:    < from: NM Bone Marrow Imaging Limited (11.17.20 @ 12:19) >    EXAM:  NM INFLAMM LOC WBC SA SD                          EXAM:  NM MULTI DAY PROCEDURE                          EXAM:  NM BONE MARROW IMG LTD AREA                                PROCEDURE DATE:  11/17/2020          INTERPRETATION:  CLINICAL INFORMATION: 65-year-old female, diabetic, right mid foot Charcot with plantar ulcer, evaluate for osteomyelitis.    RADIOPHARMACEUTICAL: 0.505 mCi In-111 labeled autologous leukocytes, i.v.injected on 11/16/2020; 9.8 mCi Tc99m sulfur colloid, i.v. injected on 11/17/2020.    TECHNIQUE:  Static images of both feet were obtained in the anterior, posterior, lateral projections approximately 24 hours following administration of radiolabeled leukocytes. The patient then was injected with Tc-99m sulfur colloidand static images of the were repeated in the same projections using dual isotope acquisition mode. Optimal patient postioning, additional imaging including SPECT/CT dual isotope imaging could not be performed due to patient's body habitus and inability to fully cooperate. Imaging was performed with sedation provided by the anesthesiologist.    COMPARISON: In-labeled leukocyte/marrow scan 7/20/2016.    FINDINGS:  There is radiolabeled leukocyte accumulation in the right midfoot, which appears more extensive than uptake and distribution of sulfur colloid uptake in the same location. This finding is new since the prior study.    IMPRESSION:  Compared to combined Indium-111 labeled leukocyte study and marrow scan from 7/20/2016: New labeled wbc uptake appears more extensive than sulfur colloid uptake in right midfoot, osteomyelitis cannot be excluded.  Study limited due to patient's body habitus and inability to fully cooperate.                SHEILA GRECO MD; Attending Nuclear Medicine  Thisdocument has been electronically signed. Nov 17 2020  2:54PM    < end of copied text >

## 2020-11-23 NOTE — PROGRESS NOTE ADULT - ASSESSMENT
64 yo F pt w/ R foot wound - s/p R foot bone biopsy  - Pt seen and evaluated  - WBC labeled bone scan + OM, right midfoot  - R foot plantar midfoot ulceration, stable with fibrotic base with no signs of infection  - s/p R foot bone biopsy with no strikethrough on dressing, sutures intact, no dehiscence, no signs of infection  - ID recs appreciated, will await final bone biopsy culture & pathology to determine Abx regimen - stable for discharge once final ID recs  - discharge info in paperwork  - d/w attending

## 2020-11-23 NOTE — PROGRESS NOTE ADULT - SUBJECTIVE AND OBJECTIVE BOX
Patient is a 65y old  Female who presents with a chief complaint of Right foot infection (19 Nov 2020 16:39)      SUBJECTIVE / OVERNIGHT EVENTS: no acute events overnight     MEDICATIONS  (STANDING):  albuterol/ipratropium for Nebulization. 3 milliLiter(s) Nebulizer every 6 hours  AQUAPHOR (petrolatum Ointment) 1 Application(s) Topical two times a day  artificial tears (preservative free) Ophthalmic Solution 1 Drop(s) Both EYES every 6 hours  aspirin enteric coated 81 milliGRAM(s) Oral daily  atorvastatin 80 milliGRAM(s) Oral at bedtime  ceFAZolin   IVPB 2000 milliGRAM(s) IV Intermittent every 8 hours  chlorhexidine 2% Cloths 1 Application(s) Topical daily  collagenase Ointment 1 Application(s) Topical daily  dextrose 5%. 1000 milliLiter(s) (50 mL/Hr) IV Continuous <Continuous>  dextrose 50% Injectable 12.5 Gram(s) IV Push once  dextrose 50% Injectable 25 Gram(s) IV Push once  dextrose 50% Injectable 25 Gram(s) IV Push once  furosemide    Tablet 40 milliGRAM(s) Oral daily  heparin   Injectable 7500 Unit(s) SubCutaneous every 8 hours  insulin glargine Injectable (LANTUS) 60 Unit(s) SubCutaneous every morning  insulin glargine Injectable (LANTUS) 70 Unit(s) SubCutaneous at bedtime  insulin lispro (ADMELOG) corrective regimen sliding scale   SubCutaneous at bedtime  insulin lispro (ADMELOG) corrective regimen sliding scale   SubCutaneous three times a day before meals  insulin lispro Injectable (ADMELOG) 15 Unit(s) SubCutaneous three times a day before meals  multivitamin 1 Tablet(s) Oral daily  nystatin Powder 1 Application(s) Topical two times a day  oxyCODONE  ER Tablet 80 milliGRAM(s) Oral every 12 hours  polyethylene glycol 3350 17 Gram(s) Oral daily  senna 2 Tablet(s) Oral at bedtime    MEDICATIONS  (PRN):  clonazePAM  Tablet 0.5 milliGRAM(s) Oral at bedtime PRN ancxiety  dextrose 40% Gel 15 Gram(s) Oral once PRN Blood Glucose LESS THAN 70 milliGRAM(s)/deciliter  glucagon  Injectable 1 milliGRAM(s) IntraMuscular once PRN Glucose LESS THAN 70 milligrams/deciliter  oxyCODONE    IR 5 milliGRAM(s) Oral every 8 hours PRN Breakthrough Pain      Vital Signs Last 24 Hrs  T(C): 36.5 (23 Nov 2020 07:54), Max: 37.1 (22 Nov 2020 23:59)  T(F): 97.7 (23 Nov 2020 07:54), Max: 98.7 (22 Nov 2020 23:59)  HR: 74 (23 Nov 2020 07:54) (74 - 79)  BP: 105/51 (23 Nov 2020 07:54) (105/51 - 150/65)  BP(mean): --  RR: 18 (23 Nov 2020 07:54) (18 - 18)  SpO2: 96% (23 Nov 2020 07:54) (95% - 111%)  CAPILLARY BLOOD GLUCOSE      POCT Blood Glucose.: 171 mg/dL (23 Nov 2020 11:42)  POCT Blood Glucose.: 109 mg/dL (23 Nov 2020 07:42)  POCT Blood Glucose.: 121 mg/dL (22 Nov 2020 21:07)  POCT Blood Glucose.: 122 mg/dL (22 Nov 2020 16:54)    I&O's Summary    22 Nov 2020 07:01  -  23 Nov 2020 07:00  --------------------------------------------------------  IN: 820 mL / OUT: 2600 mL / NET: -1780 mL    23 Nov 2020 07:01  -  23 Nov 2020 13:36  --------------------------------------------------------  IN: 620 mL / OUT: 0 mL / NET: 620 mL        PHYSICAL EXAM:  GENERAL: NAD  EYES:  conjunctiva and sclera clear  CHEST/LUNG: Clear to auscultation bilaterally; No wheeze  HEART: +S1/S2, reg   ABDOMEN: Soft, Nontender  EXTREMITIES:  b/l LE edema, with RLE erythema   PSYCH: AAOx3      LABS:                        13.7   9.25  )-----------( 226      ( 23 Nov 2020 11:27 )             44.3     11-23    138  |  102  |  32<H>  ----------------------------<  107<H>  4.7   |  21<L>  |  1.69<H>    Ca    9.8      23 Nov 2020 08:52

## 2020-11-23 NOTE — PROGRESS NOTE ADULT - ASSESSMENT
65 f with  morbid obesity, DM, HTN, asthma, CHF, pulmonary HTN, CKD, diabetic neuropathy, Charcot foot, chronic venous statis and chronic right foot wound, now with R foot erythema   afebrile, WBC normal  s/p debridement by podiatry and cx polymicrobial with anaerobes and also staph aureus, pt had MSSA and finegolida 7/2020  was on vanco and zosyn here with elevated vanco level and increased Cr    diabetic foot infection and cellulitis, r/o osteo but pt did not fit in the MRI and CT with contrast was not done due to renal function a bone scan showed new uptake in R midfoot so possible osteo  s/p bone biopsy by podiatry  JACQUES on CKD    * wound cx with MSSA and polymicrobial with >4 organisms  * f/u the bone biopsy cx is negative but still awaiting path  * on  unasyn 3 q 8, antibiotics started 11/9, now day 15, would switch to cefazolin 2 q 12 while awaiting the  path  * at this point with the positive bone scan and negative bone biopsy cx, would wait for the path, if path is negative for osteo then would discontinue the antibiotics, if path s/o osteo then will continue with IV cefazolin or 4 more weeks to complete a total 6 week course  * f/u with podiatry       The above assessment and plan was discussed with the primary team and podiatry    Steffany Lockhart MD  Pager [phone]  After 5pm and on weekends call [phone]

## 2020-11-24 LAB
ANION GAP SERPL CALC-SCNC: 13 MMOL/L — SIGNIFICANT CHANGE UP (ref 5–17)
BUN SERPL-MCNC: 32 MG/DL — HIGH (ref 7–23)
CALCIUM SERPL-MCNC: 10.1 MG/DL — SIGNIFICANT CHANGE UP (ref 8.4–10.5)
CHLORIDE SERPL-SCNC: 99 MMOL/L — SIGNIFICANT CHANGE UP (ref 96–108)
CO2 SERPL-SCNC: 28 MMOL/L — SIGNIFICANT CHANGE UP (ref 22–31)
CREAT SERPL-MCNC: 1.7 MG/DL — HIGH (ref 0.5–1.3)
GLUCOSE BLDC GLUCOMTR-MCNC: 114 MG/DL — HIGH (ref 70–99)
GLUCOSE BLDC GLUCOMTR-MCNC: 134 MG/DL — HIGH (ref 70–99)
GLUCOSE BLDC GLUCOMTR-MCNC: 152 MG/DL — HIGH (ref 70–99)
GLUCOSE BLDC GLUCOMTR-MCNC: 156 MG/DL — HIGH (ref 70–99)
GLUCOSE SERPL-MCNC: 136 MG/DL — HIGH (ref 70–99)
POTASSIUM SERPL-MCNC: 4.3 MMOL/L — SIGNIFICANT CHANGE UP (ref 3.5–5.3)
POTASSIUM SERPL-SCNC: 4.3 MMOL/L — SIGNIFICANT CHANGE UP (ref 3.5–5.3)
SODIUM SERPL-SCNC: 140 MMOL/L — SIGNIFICANT CHANGE UP (ref 135–145)
SURGICAL PATHOLOGY STUDY: SIGNIFICANT CHANGE UP

## 2020-11-24 PROCEDURE — 99232 SBSQ HOSP IP/OBS MODERATE 35: CPT

## 2020-11-24 RX ORDER — CLONAZEPAM 1 MG
0.5 TABLET ORAL AT BEDTIME
Refills: 0 | Status: DISCONTINUED | OUTPATIENT
Start: 2020-11-24 | End: 2020-11-25

## 2020-11-24 RX ADMIN — HEPARIN SODIUM 7500 UNIT(S): 5000 INJECTION INTRAVENOUS; SUBCUTANEOUS at 05:23

## 2020-11-24 RX ADMIN — Medication 1 APPLICATION(S): at 12:30

## 2020-11-24 RX ADMIN — Medication 1 APPLICATION(S): at 08:29

## 2020-11-24 RX ADMIN — Medication 100 MILLIGRAM(S): at 05:23

## 2020-11-24 RX ADMIN — OXYCODONE HYDROCHLORIDE 80 MILLIGRAM(S): 5 TABLET ORAL at 05:26

## 2020-11-24 RX ADMIN — Medication 1 TABLET(S): at 12:30

## 2020-11-24 RX ADMIN — Medication 15 UNIT(S): at 08:29

## 2020-11-24 RX ADMIN — Medication 100 MILLIGRAM(S): at 13:13

## 2020-11-24 RX ADMIN — HEPARIN SODIUM 7500 UNIT(S): 5000 INJECTION INTRAVENOUS; SUBCUTANEOUS at 21:43

## 2020-11-24 RX ADMIN — Medication 1 DROP(S): at 23:10

## 2020-11-24 RX ADMIN — NYSTATIN CREAM 1 APPLICATION(S): 100000 CREAM TOPICAL at 17:22

## 2020-11-24 RX ADMIN — OXYCODONE HYDROCHLORIDE 5 MILLIGRAM(S): 5 TABLET ORAL at 00:55

## 2020-11-24 RX ADMIN — Medication 15 UNIT(S): at 17:28

## 2020-11-24 RX ADMIN — Medication 3 MILLILITER(S): at 23:10

## 2020-11-24 RX ADMIN — Medication 3 MILLILITER(S): at 05:24

## 2020-11-24 RX ADMIN — OXYCODONE HYDROCHLORIDE 80 MILLIGRAM(S): 5 TABLET ORAL at 06:26

## 2020-11-24 RX ADMIN — NYSTATIN CREAM 1 APPLICATION(S): 100000 CREAM TOPICAL at 08:30

## 2020-11-24 RX ADMIN — OXYCODONE HYDROCHLORIDE 5 MILLIGRAM(S): 5 TABLET ORAL at 09:15

## 2020-11-24 RX ADMIN — Medication 3 MILLILITER(S): at 17:22

## 2020-11-24 RX ADMIN — INSULIN GLARGINE 70 UNIT(S): 100 INJECTION, SOLUTION SUBCUTANEOUS at 21:42

## 2020-11-24 RX ADMIN — HEPARIN SODIUM 7500 UNIT(S): 5000 INJECTION INTRAVENOUS; SUBCUTANEOUS at 13:13

## 2020-11-24 RX ADMIN — POLYETHYLENE GLYCOL 3350 17 GRAM(S): 17 POWDER, FOR SOLUTION ORAL at 12:30

## 2020-11-24 RX ADMIN — OXYCODONE HYDROCHLORIDE 5 MILLIGRAM(S): 5 TABLET ORAL at 16:28

## 2020-11-24 RX ADMIN — Medication 1 DROP(S): at 05:24

## 2020-11-24 RX ADMIN — SENNA PLUS 2 TABLET(S): 8.6 TABLET ORAL at 21:43

## 2020-11-24 RX ADMIN — Medication 1 DROP(S): at 17:22

## 2020-11-24 RX ADMIN — Medication 15 UNIT(S): at 12:28

## 2020-11-24 RX ADMIN — OXYCODONE HYDROCHLORIDE 5 MILLIGRAM(S): 5 TABLET ORAL at 08:28

## 2020-11-24 RX ADMIN — ATORVASTATIN CALCIUM 80 MILLIGRAM(S): 80 TABLET, FILM COATED ORAL at 21:43

## 2020-11-24 RX ADMIN — INSULIN GLARGINE 60 UNIT(S): 100 INJECTION, SOLUTION SUBCUTANEOUS at 08:27

## 2020-11-24 RX ADMIN — Medication 1 DROP(S): at 00:00

## 2020-11-24 RX ADMIN — Medication 81 MILLIGRAM(S): at 12:30

## 2020-11-24 RX ADMIN — Medication 0.5 MILLIGRAM(S): at 21:50

## 2020-11-24 RX ADMIN — Medication 1 APPLICATION(S): at 17:29

## 2020-11-24 RX ADMIN — Medication 1 DROP(S): at 12:29

## 2020-11-24 RX ADMIN — CHLORHEXIDINE GLUCONATE 1 APPLICATION(S): 213 SOLUTION TOPICAL at 13:12

## 2020-11-24 RX ADMIN — Medication 2: at 12:28

## 2020-11-24 RX ADMIN — Medication 40 MILLIGRAM(S): at 05:23

## 2020-11-24 RX ADMIN — OXYCODONE HYDROCHLORIDE 5 MILLIGRAM(S): 5 TABLET ORAL at 00:00

## 2020-11-24 RX ADMIN — OXYCODONE HYDROCHLORIDE 80 MILLIGRAM(S): 5 TABLET ORAL at 17:26

## 2020-11-24 RX ADMIN — Medication 3 MILLILITER(S): at 12:29

## 2020-11-24 NOTE — CHART NOTE - NSCHARTNOTEFT_GEN_A_CORE
Nutrition Follow Up Note  Patient seen for: Nutrition follow up on 8MON  Chart reviewed, events noted. Pt with right foot wound, Podiatry following, S/P bone biopsy earlier today, noted pt +osteomyelitis, right midfoot. Noted Endocrine following and adjusting insulin as needed. Noted still awaiting final bone biopsy culture and pathology report to determine abx regimen per  podiatry note.    Source: patient, medical record    Diet: Consistent Carbohydrate w/Evening Snack:   DASH/TLC {Sodium & Cholesterol Restricted} (DASH) (20 @ 11:29) [Active]    Patient reports: No GI distress noted at this time.  Last bowel movement  per flow sheets; pt reports she is receiving miralax. Pt reports appetite is intact consuming >50-75% at meals; RD observed pt with lunch meal and pt had consume ~70% and was in the middle of eating. Noted previous RD recommended Edward for wound healing; pt reports she has no recevied Edward but willing to try. Pt with nutrition-related questions regarding meal plans for carbohydrate counting for when she goes home; RD provided pt with additional verbal and written heart health, diabetes education at this time. Pt with no additional nutrition-related questions at this time.     PO intake: % per flow sheets; and >50-75% per pt reports    Daily Weight in k.1 (), 174.8 ()  Noted pt with slight weight increase; likely related to fluid shifts at this time as pt with 1+ edema; will continue to monitor    Pertinent Medications: MEDICATIONS  (STANDING):  albuterol/ipratropium for Nebulization. 3 milliLiter(s) Nebulizer every 6 hours  AQUAPHOR (petrolatum Ointment) 1 Application(s) Topical two times a day  artificial tears (preservative free) Ophthalmic Solution 1 Drop(s) Both EYES every 6 hours  aspirin enteric coated 81 milliGRAM(s) Oral daily  atorvastatin 80 milliGRAM(s) Oral at bedtime  ceFAZolin   IVPB 2000 milliGRAM(s) IV Intermittent every 8 hours  chlorhexidine 2% Cloths 1 Application(s) Topical daily  collagenase Ointment 1 Application(s) Topical daily  dextrose 5%. 1000 milliLiter(s) (50 mL/Hr) IV Continuous <Continuous>  dextrose 50% Injectable 12.5 Gram(s) IV Push once  dextrose 50% Injectable 25 Gram(s) IV Push once  dextrose 50% Injectable 25 Gram(s) IV Push once  furosemide    Tablet 40 milliGRAM(s) Oral daily  heparin   Injectable 7500 Unit(s) SubCutaneous every 8 hours  insulin glargine Injectable (LANTUS) 60 Unit(s) SubCutaneous every morning  insulin glargine Injectable (LANTUS) 70 Unit(s) SubCutaneous at bedtime  insulin lispro (ADMELOG) corrective regimen sliding scale   SubCutaneous at bedtime  insulin lispro (ADMELOG) corrective regimen sliding scale   SubCutaneous three times a day before meals  insulin lispro Injectable (ADMELOG) 15 Unit(s) SubCutaneous three times a day before meals  multivitamin 1 Tablet(s) Oral daily  nystatin Powder 1 Application(s) Topical two times a day  oxyCODONE  ER Tablet 80 milliGRAM(s) Oral every 12 hours  polyethylene glycol 3350 17 Gram(s) Oral daily  senna 2 Tablet(s) Oral at bedtime    MEDICATIONS  (PRN):  clonazePAM  Tablet 0.5 milliGRAM(s) Oral at bedtime PRN ancxiety  dextrose 40% Gel 15 Gram(s) Oral once PRN Blood Glucose LESS THAN 70 milliGRAM(s)/deciliter  glucagon  Injectable 1 milliGRAM(s) IntraMuscular once PRN Glucose LESS THAN 70 milligrams/deciliter  oxyCODONE    IR 5 milliGRAM(s) Oral every 8 hours PRN Breakthrough Pain    Pertinent Labs:  @ 08:59: Na 140, BUN 32<H>, Cr 1.70<H>, <H>, K+ 4.3    Finger Sticks:  POCT Blood Glucose.: 152 mg/dL ( @ 11:36)  POCT Blood Glucose.: 134 mg/dL ( @ 08:01)  POCT Blood Glucose.: 164 mg/dL ( @ 21:17)  POCT Blood Glucose.: 98 mg/dL ( @ 17:02)      Skin per nursing documentation: no pressure injuries per flow sheets; noted pt with right foot diabetic wound per flow sheets  Edema: 1+ left foot, right foot per flow sheets    Estimated Needs:   [x] no change since previous assessment    Previous Nutrition Diagnosis:   1. overweight/obesity  2. altered nutrition related lab values  Nutrition Diagnoses continue to remain applicable and are ongoing, being addressed with therapeutic diet restrictions, diet reinforcement    New Nutrition Diagnosis: none at this time    Interventions/ Recommend  1) Continue current consistent carbohydrate + DASH diet as tolerated  2) Recommend adding Edward 2x/day for diabetes wound healing if no contraindications  3) RD discussed importance of continued adequate intake, focus on protein foods as well as written education provided regarding heart healthy consistent carbohydrate diet for home; pt receptive    Monitoring and Evaluation:   Continue to monitor Nutritional intake, Tolerance to diet prescription, weights, labs, skin integrity    RD remains available upon request and will follow up per protocol  Klarissa Machuca MS, RD, CDN pgr #[phone] Nutrition Follow Up Note  Patient seen for: Nutrition follow up on 8MON  Chart reviewed, events noted. Pt with right foot wound, Podiatry following, S/P bone biopsy earlier today, noted pt +osteomyelitis, right midfoot. Noted Endocrine following and adjusting insulin as needed. Noted still awaiting final bone biopsy culture and pathology report to determine abx regimen per  podiatry note. Noted per  infection control note, bone cx negative and pathology did not show osteo.    Source: patient, medical record    Diet: Consistent Carbohydrate w/Evening Snack:   DASH/TLC {Sodium & Cholesterol Restricted} (DASH) (20 @ 11:29) [Active]    Patient reports: No GI distress noted at this time.  Last bowel movement  per flow sheets; pt reports she is receiving miralax. Pt reports appetite is intact consuming >50-75% at meals; RD observed pt with lunch meal and pt had consume ~70% and was in the middle of eating. Noted previous RD recommended Edward for wound healing; pt reports she has no received Edward but willing to try. Pt with nutrition-related questions regarding meal plans for carbohydrate counting for when she goes home; RD provided pt with additional verbal and written heart health, diabetes education at this time. Pt with no additional nutrition-related questions at this time.     PO intake: % per flow sheets; and >50-75% per pt reports    Daily Weight in k.1 (), 174.8 ()  Noted pt with slight weight increase; likely related to fluid shifts at this time as pt with 1+ edema; will continue to monitor    Pertinent Medications: MEDICATIONS  (STANDING):  albuterol/ipratropium for Nebulization. 3 milliLiter(s) Nebulizer every 6 hours  AQUAPHOR (petrolatum Ointment) 1 Application(s) Topical two times a day  artificial tears (preservative free) Ophthalmic Solution 1 Drop(s) Both EYES every 6 hours  aspirin enteric coated 81 milliGRAM(s) Oral daily  atorvastatin 80 milliGRAM(s) Oral at bedtime  ceFAZolin   IVPB 2000 milliGRAM(s) IV Intermittent every 8 hours  chlorhexidine 2% Cloths 1 Application(s) Topical daily  collagenase Ointment 1 Application(s) Topical daily  dextrose 5%. 1000 milliLiter(s) (50 mL/Hr) IV Continuous <Continuous>  dextrose 50% Injectable 12.5 Gram(s) IV Push once  dextrose 50% Injectable 25 Gram(s) IV Push once  dextrose 50% Injectable 25 Gram(s) IV Push once  furosemide    Tablet 40 milliGRAM(s) Oral daily  heparin   Injectable 7500 Unit(s) SubCutaneous every 8 hours  insulin glargine Injectable (LANTUS) 60 Unit(s) SubCutaneous every morning  insulin glargine Injectable (LANTUS) 70 Unit(s) SubCutaneous at bedtime  insulin lispro (ADMELOG) corrective regimen sliding scale   SubCutaneous at bedtime  insulin lispro (ADMELOG) corrective regimen sliding scale   SubCutaneous three times a day before meals  insulin lispro Injectable (ADMELOG) 15 Unit(s) SubCutaneous three times a day before meals  multivitamin 1 Tablet(s) Oral daily  nystatin Powder 1 Application(s) Topical two times a day  oxyCODONE  ER Tablet 80 milliGRAM(s) Oral every 12 hours  polyethylene glycol 3350 17 Gram(s) Oral daily  senna 2 Tablet(s) Oral at bedtime    MEDICATIONS  (PRN):  clonazePAM  Tablet 0.5 milliGRAM(s) Oral at bedtime PRN ancxiety  dextrose 40% Gel 15 Gram(s) Oral once PRN Blood Glucose LESS THAN 70 milliGRAM(s)/deciliter  glucagon  Injectable 1 milliGRAM(s) IntraMuscular once PRN Glucose LESS THAN 70 milligrams/deciliter  oxyCODONE    IR 5 milliGRAM(s) Oral every 8 hours PRN Breakthrough Pain    Pertinent Labs:  @ 08:59: Na 140, BUN 32<H>, Cr 1.70<H>, <H>, K+ 4.3    Finger Sticks:  POCT Blood Glucose.: 152 mg/dL ( @ 11:36)  POCT Blood Glucose.: 134 mg/dL ( @ 08:01)  POCT Blood Glucose.: 164 mg/dL ( @ 21:17)  POCT Blood Glucose.: 98 mg/dL ( @ 17:02)      Skin per nursing documentation: no pressure injuries per flow sheets; noted pt with right foot diabetic wound per flow sheets  Edema: 1+ left foot, right foot per flow sheets    Estimated Needs:   [x] no change since previous assessment    Previous Nutrition Diagnosis:   1. overweight/obesity  2. altered nutrition related lab values  Nutrition Diagnoses continue to remain applicable and are ongoing, being addressed with therapeutic diet restrictions, diet reinforcement    New Nutrition Diagnosis: none at this time    Interventions/ Recommend  1) Continue current consistent carbohydrate + DASH diet as tolerated  2) Recommend adding Edward 2x/day for diabetes wound healing if no contraindications  3) RD discussed importance of continued adequate intake, focus on protein foods as well as written education provided regarding heart healthy consistent carbohydrate diet for home; pt receptive    Monitoring and Evaluation:   Continue to monitor Nutritional intake, Tolerance to diet prescription, weights, labs, skin integrity    RD remains available upon request and will follow up per protocol  Klarissa Machuca MS, RD, CDN pgr #[phone]

## 2020-11-24 NOTE — PROGRESS NOTE ADULT - SUBJECTIVE AND OBJECTIVE BOX
Follow Up:  diabetic foot infection and cellulitis    Interval History:  bone cx is negative and the bone biopsy did not show osteo    ROS:      All other systems negative    Constitutional: no fever, no chills  Cardiovascular:  no chest pain, no palpitation  Respiratory:  no SOB, no cough  GI:  no abd pain, no vomiting, no diarrhea  urinary: no dysuria, no hematuria, no flank pain  musculoskeletal:  improvemed in R foot redness and pain,  some pain and erythema at the L lateral leg  skin:  no rash, R foot wound   neurology:  no headache, no seizure          Allergies  adhesives (Rash)  Bactrim (Flushing)  latex (Rash)  wool- rash, itch (Other)        ANTIMICROBIALS:  ceFAZolin   IVPB 2000 every 8 hours      OTHER MEDS:  albuterol/ipratropium for Nebulization. 3 milliLiter(s) Nebulizer every 6 hours  AQUAPHOR (petrolatum Ointment) 1 Application(s) Topical two times a day  artificial tears (preservative free) Ophthalmic Solution 1 Drop(s) Both EYES every 6 hours  aspirin enteric coated 81 milliGRAM(s) Oral daily  atorvastatin 80 milliGRAM(s) Oral at bedtime  chlorhexidine 2% Cloths 1 Application(s) Topical daily  clonazePAM  Tablet 0.5 milliGRAM(s) Oral at bedtime PRN  collagenase Ointment 1 Application(s) Topical daily  dextrose 40% Gel 15 Gram(s) Oral once PRN  dextrose 5%. 1000 milliLiter(s) IV Continuous <Continuous>  dextrose 50% Injectable 12.5 Gram(s) IV Push once  dextrose 50% Injectable 25 Gram(s) IV Push once  dextrose 50% Injectable 25 Gram(s) IV Push once  furosemide    Tablet 40 milliGRAM(s) Oral daily  glucagon  Injectable 1 milliGRAM(s) IntraMuscular once PRN  heparin   Injectable 7500 Unit(s) SubCutaneous every 8 hours  insulin glargine Injectable (LANTUS) 60 Unit(s) SubCutaneous every morning  insulin glargine Injectable (LANTUS) 70 Unit(s) SubCutaneous at bedtime  insulin lispro (ADMELOG) corrective regimen sliding scale   SubCutaneous at bedtime  insulin lispro (ADMELOG) corrective regimen sliding scale   SubCutaneous three times a day before meals  insulin lispro Injectable (ADMELOG) 15 Unit(s) SubCutaneous three times a day before meals  multivitamin 1 Tablet(s) Oral daily  nystatin Powder 1 Application(s) Topical two times a day  oxyCODONE    IR 5 milliGRAM(s) Oral every 8 hours PRN  oxyCODONE  ER Tablet 80 milliGRAM(s) Oral every 12 hours  polyethylene glycol 3350 17 Gram(s) Oral daily  senna 2 Tablet(s) Oral at bedtime      Vital Signs Last 24 Hrs  T(C): 36.9 (24 Nov 2020 07:41), Max: 36.9 (24 Nov 2020 07:41)  T(F): 98.4 (24 Nov 2020 07:41), Max: 98.4 (24 Nov 2020 07:41)  HR: 76 (24 Nov 2020 07:41) (67 - 80)  BP: 147/66 (24 Nov 2020 07:41) (128/73 - 156/71)  BP(mean): --  RR: 18 (24 Nov 2020 07:41) (18 - 18)  SpO2: 96% (24 Nov 2020 07:41) (93% - 97%)    Physical Exam:  General:    NAD,  non toxic  Cardio:     regular S1, S2,  no murmur  Respiratory:    clear b/l,    no wheezing  abd:     soft,   BS +,   no tenderness  :   no CVAT,  no suprapubic tenderness,   no  de la torre  Musculoskeletal: R plantar foot wound with improved erythema on the dorsal and plantar surface   L lateral LE slightly erythematous on the chronic lesion  vascular: no phlebitis  Skin:    no rash                          13.7   9.25  )-----------( 226      ( 23 Nov 2020 11:27 )             44.3       11-24    140  |  99  |  32<H>  ----------------------------<  136<H>  4.3   |  28  |  1.70<H>    Ca    10.1      24 Nov 2020 08:59            MICROBIOLOGY:  v  .Tissue Right foot bone biopsy  11-18-20   No growth at 5 days  --    No polymorphonuclear cells seen per low power field  No organisms seen per oil power field      .Abscess right foot wound  11-10-20   Culture yields >4 types of aerobic and/or anaerobic bacteria  Call client services within 7 days if further workup is clinically  indicated. Culture includes  Moderate Staphylococcus aureus  --  Staphylococcus aureus      .Blood Blood-Peripheral  11-10-20   No Growth Final  --  --                RADIOLOGY:  Images independently visualized and reviewed personally, findings as below  < from: [facility] Bone Marrow Imaging Limited (11.17.20 @ 12:19) >  IMPRESSION:  Compared to combined Indium-111 labeled leukocyte study and marrow scan from 7/20/2016: New labeled wbc uptake appears more extensive than sulfur colloid uptake in right midfoot, osteomyelitis cannot be excluded.  Study limited due to patient's body habitus and inability to fully cooperate.      < end of copied text >

## 2020-11-24 NOTE — PROGRESS NOTE ADULT - ASSESSMENT
65 f with  morbid obesity, DM, HTN, asthma, CHF, pulmonary HTN, CKD, diabetic neuropathy, Charcot foot, chronic venous statis and chronic right foot wound, now with R foot erythema   afebrile, WBC normal  s/p debridement by podiatry and cx polymicrobial with anaerobes and also staph aureus, pt had MSSA and finegolida 7/2020  was on vanco and zosyn here with elevated vanco level and increased Cr    diabetic foot infection and cellulitis, r/o osteo but pt did not fit in the MRI and CT with contrast was not done due to renal function a bone scan showed new uptake in R midfoot so possible osteo  s/p bone biopsy by podiatry  JACQUES on CKD    * wound cx with MSSA and polymicrobial with >4 organisms  * the path did not show any osteo  * s/p 16 days of antibiotics (vanco, zosyn, unasyn and cefazolin) started 11/9, with the negative path and bone cx, would DC antibiotics, pt already had enough for the soft tissue infection  * f/u with podiatry       The above assessment and plan was discussed with the primary team and podiatry    Steffany Lockhart MD  Pager [phone]  After 5pm and on weekends call [phone]

## 2020-11-24 NOTE — PROGRESS NOTE ADULT - ASSESSMENT
64 yo F pt w/ R foot wound - s/p R foot bone biopsy    - WBC labeled bone scan + OM, right midfoot  - R foot plantar midfoot ulceration, stable with fibrotic base with no signs of infection  - s/p R foot bone biopsy with no strikethrough on dressing, sutures intact, no dehiscence, no signs of infection  - final bbx and bcx no growth  - ID recs for PO Abx regimen   - discharge info in paperwork  - pod stable for DC

## 2020-11-24 NOTE — CHART NOTE - NSCHARTNOTESELECT_GEN_ALL_CORE
Malnutrition Notification
Endocrinology/Event Note
ISTOP/Event Note
Nutrition Services
Nutrition Services
nephrology

## 2020-11-24 NOTE — PROGRESS NOTE ADULT - PROBLEM SELECTOR PLAN 1
History of chronic wound and charcot foot with concern for overlying infection due to new erythema and reported drainage  - c/w Unasyn, per ID  - Podiatry consult - debrided at bedside and cultures obtained. Follow up wound cultures - Staph aureus and contaminant.   - DVT US - unable to visualize R common femoral, but otherwise negative  - repeat arterial studies ordered which show mild decrease in flow, appreciate vascular recs   -MRI foot not done as machine unable to accommodate patient, can get CT of the foot with contrast, once cleared by renal (consult placed) - patient however now refuses IV contrast over kidney concerns.   -NM bone scan done, concerning for midfoot osteomyelitis, now s/p bone biopsy  -f/u bone biopsy results which will determine the total length of abx treatment  -prelim bone biopsy cultures with no growth. Awaiting bone biopsy PATH report as well. If both are neg, less likely to treat with IV abx long term. Will need that information before decision regarding PICC and IV abx can be made.  -will call and expedite path

## 2020-11-24 NOTE — PROGRESS NOTE ADULT - SUBJECTIVE AND OBJECTIVE BOX
Patient is a 65y old  Female who presents with a chief complaint of Right foot infection (19 Nov 2020 16:39)    SUBJECTIVE / OVERNIGHT EVENTS: no acute events overnight     MEDICATIONS  (STANDING):  albuterol/ipratropium for Nebulization. 3 milliLiter(s) Nebulizer every 6 hours  AQUAPHOR (petrolatum Ointment) 1 Application(s) Topical two times a day  artificial tears (preservative free) Ophthalmic Solution 1 Drop(s) Both EYES every 6 hours  aspirin enteric coated 81 milliGRAM(s) Oral daily  atorvastatin 80 milliGRAM(s) Oral at bedtime  ceFAZolin   IVPB 2000 milliGRAM(s) IV Intermittent every 8 hours  chlorhexidine 2% Cloths 1 Application(s) Topical daily  collagenase Ointment 1 Application(s) Topical daily  dextrose 5%. 1000 milliLiter(s) (50 mL/Hr) IV Continuous <Continuous>  dextrose 50% Injectable 12.5 Gram(s) IV Push once  dextrose 50% Injectable 25 Gram(s) IV Push once  dextrose 50% Injectable 25 Gram(s) IV Push once  furosemide    Tablet 40 milliGRAM(s) Oral daily  heparin   Injectable 7500 Unit(s) SubCutaneous every 8 hours  insulin glargine Injectable (LANTUS) 60 Unit(s) SubCutaneous every morning  insulin glargine Injectable (LANTUS) 70 Unit(s) SubCutaneous at bedtime  insulin lispro (ADMELOG) corrective regimen sliding scale   SubCutaneous at bedtime  insulin lispro (ADMELOG) corrective regimen sliding scale   SubCutaneous three times a day before meals  insulin lispro Injectable (ADMELOG) 15 Unit(s) SubCutaneous three times a day before meals  multivitamin 1 Tablet(s) Oral daily  nystatin Powder 1 Application(s) Topical two times a day  oxyCODONE  ER Tablet 80 milliGRAM(s) Oral every 12 hours  polyethylene glycol 3350 17 Gram(s) Oral daily  senna 2 Tablet(s) Oral at bedtime    MEDICATIONS  (PRN):  clonazePAM  Tablet 0.5 milliGRAM(s) Oral at bedtime PRN ancxiety  dextrose 40% Gel 15 Gram(s) Oral once PRN Blood Glucose LESS THAN 70 milliGRAM(s)/deciliter  glucagon  Injectable 1 milliGRAM(s) IntraMuscular once PRN Glucose LESS THAN 70 milligrams/deciliter  oxyCODONE    IR 5 milliGRAM(s) Oral every 8 hours PRN Breakthrough Pain      Vital Signs Last 24 Hrs  T(C): 36.9 (24 Nov 2020 07:41), Max: 36.9 (24 Nov 2020 07:41)  T(F): 98.4 (24 Nov 2020 07:41), Max: 98.4 (24 Nov 2020 07:41)  HR: 76 (24 Nov 2020 07:41) (67 - 80)  BP: 147/66 (24 Nov 2020 07:41) (128/73 - 156/71)  BP(mean): --  RR: 18 (24 Nov 2020 07:41) (18 - 18)  SpO2: 96% (24 Nov 2020 07:41) (93% - 97%)  CAPILLARY BLOOD GLUCOSE      POCT Blood Glucose.: 152 mg/dL (24 Nov 2020 11:36)  POCT Blood Glucose.: 134 mg/dL (24 Nov 2020 08:01)  POCT Blood Glucose.: 164 mg/dL (23 Nov 2020 21:17)  POCT Blood Glucose.: 98 mg/dL (23 Nov 2020 17:02)    I&O's Summary    23 Nov 2020 07:01  -  24 Nov 2020 07:00  --------------------------------------------------------  IN: 1360 mL / OUT: 600 mL / NET: 760 mL    24 Nov 2020 07:01  -  24 Nov 2020 13:09  --------------------------------------------------------  IN: 480 mL / OUT: 0 mL / NET: 480 mL        PHYSICAL EXAM:  GENERAL: NAD  EYES: conjunctiva and sclera clear  CHEST/LUNG: Clear to auscultation bilaterally; No wheeze  HEART: +S1/S2, reg   ABDOMEN: Soft, Nontender, Nondistended  EXTREMITIES: b/l LE edema   PSYCH: AAOx3      LABS:                        13.7   9.25  )-----------( 226      ( 23 Nov 2020 11:27 )             44.3     11-24    140  |  99  |  32<H>  ----------------------------<  136<H>  4.3   |  28  |  1.70<H>    Ca    10.1      24 Nov 2020 08:59

## 2020-11-25 VITALS
DIASTOLIC BLOOD PRESSURE: 70 MMHG | SYSTOLIC BLOOD PRESSURE: 123 MMHG | OXYGEN SATURATION: 97 % | RESPIRATION RATE: 18 BRPM | TEMPERATURE: 98 F | HEART RATE: 77 BPM

## 2020-11-25 LAB
GLUCOSE BLDC GLUCOMTR-MCNC: 154 MG/DL — HIGH (ref 70–99)
GLUCOSE BLDC GLUCOMTR-MCNC: 172 MG/DL — HIGH (ref 70–99)

## 2020-11-25 PROCEDURE — 93971 EXTREMITY STUDY: CPT

## 2020-11-25 PROCEDURE — 97110 THERAPEUTIC EXERCISES: CPT

## 2020-11-25 PROCEDURE — 80202 ASSAY OF VANCOMYCIN: CPT

## 2020-11-25 PROCEDURE — 99232 SBSQ HOSP IP/OBS MODERATE 35: CPT

## 2020-11-25 PROCEDURE — 82435 ASSAY OF BLOOD CHLORIDE: CPT

## 2020-11-25 PROCEDURE — 87205 SMEAR GRAM STAIN: CPT

## 2020-11-25 PROCEDURE — 87186 SC STD MICRODIL/AGAR DIL: CPT

## 2020-11-25 PROCEDURE — 94640 AIRWAY INHALATION TREATMENT: CPT

## 2020-11-25 PROCEDURE — 87075 CULTR BACTERIA EXCEPT BLOOD: CPT

## 2020-11-25 PROCEDURE — 85014 HEMATOCRIT: CPT

## 2020-11-25 PROCEDURE — 85730 THROMBOPLASTIN TIME PARTIAL: CPT

## 2020-11-25 PROCEDURE — 84132 ASSAY OF SERUM POTASSIUM: CPT

## 2020-11-25 PROCEDURE — 84295 ASSAY OF SERUM SODIUM: CPT

## 2020-11-25 PROCEDURE — A9541: CPT

## 2020-11-25 PROCEDURE — 85652 RBC SED RATE AUTOMATED: CPT

## 2020-11-25 PROCEDURE — 82330 ASSAY OF CALCIUM: CPT

## 2020-11-25 PROCEDURE — 86769 SARS-COV-2 COVID-19 ANTIBODY: CPT

## 2020-11-25 PROCEDURE — 83735 ASSAY OF MAGNESIUM: CPT

## 2020-11-25 PROCEDURE — 97162 PT EVAL MOD COMPLEX 30 MIN: CPT

## 2020-11-25 PROCEDURE — 93925 LOWER EXTREMITY STUDY: CPT

## 2020-11-25 PROCEDURE — 73630 X-RAY EXAM OF FOOT: CPT

## 2020-11-25 PROCEDURE — 97116 GAIT TRAINING THERAPY: CPT

## 2020-11-25 PROCEDURE — 85027 COMPLETE CBC AUTOMATED: CPT

## 2020-11-25 PROCEDURE — 88311 DECALCIFY TISSUE: CPT

## 2020-11-25 PROCEDURE — 83605 ASSAY OF LACTIC ACID: CPT

## 2020-11-25 PROCEDURE — 86140 C-REACTIVE PROTEIN: CPT

## 2020-11-25 PROCEDURE — 93005 ELECTROCARDIOGRAM TRACING: CPT

## 2020-11-25 PROCEDURE — 78800 RP LOCLZJ TUM 1 AREA 1 D IMG: CPT

## 2020-11-25 PROCEDURE — 85018 HEMOGLOBIN: CPT

## 2020-11-25 PROCEDURE — 88305 TISSUE EXAM BY PATHOLOGIST: CPT

## 2020-11-25 PROCEDURE — U0003: CPT

## 2020-11-25 PROCEDURE — 82962 GLUCOSE BLOOD TEST: CPT

## 2020-11-25 PROCEDURE — 85610 PROTHROMBIN TIME: CPT

## 2020-11-25 PROCEDURE — 82803 BLOOD GASES ANY COMBINATION: CPT

## 2020-11-25 PROCEDURE — 99285 EMERGENCY DEPT VISIT HI MDM: CPT | Mod: 25

## 2020-11-25 PROCEDURE — 85025 COMPLETE CBC W/AUTO DIFF WBC: CPT

## 2020-11-25 PROCEDURE — C8929: CPT

## 2020-11-25 PROCEDURE — 78102 BONE MARROW IMAGING LTD: CPT

## 2020-11-25 PROCEDURE — 80053 COMPREHEN METABOLIC PANEL: CPT

## 2020-11-25 PROCEDURE — 90686 IIV4 VACC NO PRSV 0.5 ML IM: CPT

## 2020-11-25 PROCEDURE — 84100 ASSAY OF PHOSPHORUS: CPT

## 2020-11-25 PROCEDURE — 83036 HEMOGLOBIN GLYCOSYLATED A1C: CPT

## 2020-11-25 PROCEDURE — 71045 X-RAY EXAM CHEST 1 VIEW: CPT

## 2020-11-25 PROCEDURE — 96374 THER/PROPH/DIAG INJ IV PUSH: CPT

## 2020-11-25 PROCEDURE — A9570: CPT

## 2020-11-25 PROCEDURE — 80048 BASIC METABOLIC PNL TOTAL CA: CPT

## 2020-11-25 PROCEDURE — 82947 ASSAY GLUCOSE BLOOD QUANT: CPT

## 2020-11-25 PROCEDURE — 83880 ASSAY OF NATRIURETIC PEPTIDE: CPT

## 2020-11-25 PROCEDURE — 87040 BLOOD CULTURE FOR BACTERIA: CPT

## 2020-11-25 PROCEDURE — 87070 CULTURE OTHR SPECIMN AEROBIC: CPT

## 2020-11-25 RX ORDER — PETROLATUM,WHITE
1 JELLY (GRAM) TOPICAL
Qty: 0 | Refills: 0 | DISCHARGE
Start: 2020-11-25

## 2020-11-25 RX ORDER — NYSTATIN CREAM 100000 [USP'U]/G
1 CREAM TOPICAL
Qty: 0 | Refills: 0 | DISCHARGE
Start: 2020-11-25

## 2020-11-25 RX ORDER — OXYCODONE HYDROCHLORIDE 5 MG/1
1 TABLET ORAL
Qty: 0 | Refills: 0 | DISCHARGE
Start: 2020-11-25

## 2020-11-25 RX ORDER — OXYCODONE HYDROCHLORIDE 5 MG/1
5 TABLET ORAL EVERY 8 HOURS
Refills: 0 | Status: DISCONTINUED | OUTPATIENT
Start: 2020-11-25 | End: 2020-11-25

## 2020-11-25 RX ORDER — ONDANSETRON 8 MG/1
1 TABLET, FILM COATED ORAL
Qty: 0 | Refills: 0 | DISCHARGE

## 2020-11-25 RX ORDER — INSULIN DETEMIR 100/ML (3)
72 INSULIN PEN (ML) SUBCUTANEOUS
Qty: 0 | Refills: 0 | DISCHARGE

## 2020-11-25 RX ORDER — COLLAGENASE CLOSTRIDIUM HIST. 250 UNIT/G
1 OINTMENT (GRAM) TOPICAL
Qty: 0 | Refills: 0 | DISCHARGE
Start: 2020-11-25

## 2020-11-25 RX ORDER — LISINOPRIL 2.5 MG/1
1 TABLET ORAL
Qty: 0 | Refills: 0 | DISCHARGE

## 2020-11-25 RX ORDER — COLLAGENASE CLOSTRIDIUM HIST. 250 UNIT/G
1 OINTMENT (GRAM) TOPICAL
Qty: 1 | Refills: 0
Start: 2020-11-25

## 2020-11-25 RX ORDER — OXYCODONE HYDROCHLORIDE 5 MG/1
80 TABLET ORAL EVERY 12 HOURS
Refills: 0 | Status: DISCONTINUED | OUTPATIENT
Start: 2020-11-25 | End: 2020-11-25

## 2020-11-25 RX ORDER — POLYETHYLENE GLYCOL 3350 17 G/17G
17 POWDER, FOR SOLUTION ORAL
Qty: 0 | Refills: 0 | DISCHARGE
Start: 2020-11-25

## 2020-11-25 RX ORDER — SENNA PLUS 8.6 MG/1
2 TABLET ORAL
Qty: 0 | Refills: 0 | DISCHARGE
Start: 2020-11-25

## 2020-11-25 RX ORDER — NYSTATIN CREAM 100000 [USP'U]/G
1 CREAM TOPICAL
Qty: 1 | Refills: 0
Start: 2020-11-25

## 2020-11-25 RX ADMIN — Medication 100 MILLIGRAM(S): at 09:18

## 2020-11-25 RX ADMIN — CHLORHEXIDINE GLUCONATE 1 APPLICATION(S): 213 SOLUTION TOPICAL at 12:29

## 2020-11-25 RX ADMIN — OXYCODONE HYDROCHLORIDE 80 MILLIGRAM(S): 5 TABLET ORAL at 05:54

## 2020-11-25 RX ADMIN — OXYCODONE HYDROCHLORIDE 5 MILLIGRAM(S): 5 TABLET ORAL at 08:15

## 2020-11-25 RX ADMIN — Medication 15 UNIT(S): at 12:24

## 2020-11-25 RX ADMIN — Medication 3 MILLILITER(S): at 12:28

## 2020-11-25 RX ADMIN — HEPARIN SODIUM 7500 UNIT(S): 5000 INJECTION INTRAVENOUS; SUBCUTANEOUS at 05:55

## 2020-11-25 RX ADMIN — Medication 1 DROP(S): at 05:55

## 2020-11-25 RX ADMIN — Medication 40 MILLIGRAM(S): at 05:55

## 2020-11-25 RX ADMIN — Medication 3 MILLILITER(S): at 05:55

## 2020-11-25 RX ADMIN — OXYCODONE HYDROCHLORIDE 5 MILLIGRAM(S): 5 TABLET ORAL at 08:45

## 2020-11-25 RX ADMIN — OXYCODONE HYDROCHLORIDE 5 MILLIGRAM(S): 5 TABLET ORAL at 00:08

## 2020-11-25 RX ADMIN — Medication 1 APPLICATION(S): at 12:29

## 2020-11-25 RX ADMIN — Medication 81 MILLIGRAM(S): at 12:29

## 2020-11-25 RX ADMIN — Medication 2: at 12:24

## 2020-11-25 RX ADMIN — Medication 15 UNIT(S): at 08:18

## 2020-11-25 RX ADMIN — INSULIN GLARGINE 60 UNIT(S): 100 INJECTION, SOLUTION SUBCUTANEOUS at 08:28

## 2020-11-25 RX ADMIN — Medication 1 TABLET(S): at 12:28

## 2020-11-25 RX ADMIN — OXYCODONE HYDROCHLORIDE 5 MILLIGRAM(S): 5 TABLET ORAL at 01:08

## 2020-11-25 RX ADMIN — Medication 1 APPLICATION(S): at 09:17

## 2020-11-25 RX ADMIN — Medication 100 MILLIGRAM(S): at 01:04

## 2020-11-25 RX ADMIN — Medication 1 DROP(S): at 12:29

## 2020-11-25 RX ADMIN — HEPARIN SODIUM 7500 UNIT(S): 5000 INJECTION INTRAVENOUS; SUBCUTANEOUS at 14:20

## 2020-11-25 RX ADMIN — NYSTATIN CREAM 1 APPLICATION(S): 100000 CREAM TOPICAL at 05:55

## 2020-11-25 RX ADMIN — OXYCODONE HYDROCHLORIDE 80 MILLIGRAM(S): 5 TABLET ORAL at 06:40

## 2020-11-25 RX ADMIN — Medication 2: at 08:21

## 2020-11-25 NOTE — PROGRESS NOTE ADULT - PROBLEM SELECTOR PLAN 3
-atorvastatin 80mg po qhs    Pt. glucose has been at goal without a change in her regimen for the past 4 days. Will sign off at this time. Please reconsult if needed.    Bebeto Strange D.O  [phone]
-atorvastatin 80mg po qhs  -discussed with NP Oma Cordova MD  Endocrinology Attending  Mondays and Tuesdays 9am-6pm: [phone] (pager)  Other days, night and weekend: [phone]
Recent diagnosis  - Echo from [place] showing moderate pulmonary hypertension  - Repeat TTE here is pending
Recent diagnosis  - Echo from [place] showing moderate pulmonary hypertension  - Repeat TTE here is pending
Recent diagnosis  - Echo from [place] showing moderate pulmonary hypertension  - Repeat TTE here is pending
Recent diagnosis  - Echo from [place] showing moderate pulmonary hypertension  - Repeat TTE here is pending
Recent diagnosis, possibly from undiagnosed sleep apnea.   - Echo from [place] showing moderate pulmonary hypertension  - Repeat TTE here was difficult study with grossly preserved EF   -Needs outpatient sleep study
Recent diagnosis, possibly from undiagnosed sleep apnea.   - Echo from [place] showing moderate pulmonary hypertension  - Repeat TTE here was difficult study with grossly preserved EF   -Needs outpatient sleep study
Recent diagnosis, possibly from undiagnosed sleep apnea.   - Echo from [place] showing moderate pulmonary hypertension  - Repeat TTE here is pending  -Patient would like to see cardiology here after the echo is done.   -Needs outpatient sleep study.
Recent diagnosis, possibly from undiagnosed sleep apnea.   - Echo from [place] showing moderate pulmonary hypertension  - Repeat TTE here was difficult study with grossly preserved EF   -Needs outpatient sleep study
Recent diagnosis, possibly from undiagnosed sleep apnea.   - Echo from [place] showing moderate pulmonary hypertension  - Repeat TTE here was difficult study with grossly preserved EF   -Needs outpatient sleep study
Recent diagnosis, possibly from undiagnosed sleep apnea.   - Echo from [place] showing moderate pulmonary hypertension  - Repeat TTE here was difficult study with grossly preserved EF   -Needs outpatient sleep study.
Recent diagnosis, possibly from undiagnosed sleep apnea.   - Echo from [place] showing moderate pulmonary hypertension  - Repeat TTE here was difficult study with grossly preserved EF   -Needs outpatient sleep study
Recent diagnosis, possibly from undiagnosed sleep apnea.   - Echo from [place] showing moderate pulmonary hypertension  - Repeat TTE here was difficult study with grossly preserved EF   -Needs outpatient sleep study
Recent diagnosis, possibly from undiagnosed sleep apnea.   - Echo from [place] showing moderate pulmonary hypertension  - Repeat TTE here was difficult study with grossly preserved EF   -Needs outpatient sleep study
Recent diagnosis, possibly from undiagnosed sleep apnea.   - Echo from [place] showing moderate pulmonary hypertension  - Repeat TTE here was difficult study with grossly preserved EF   -Needs outpatient sleep study.
-atorvastatin 80mg po qhs  -discussed with NP Oma Cordova MD  Endocrinology Attending  Mondays and Tuesdays 9am-6pm: [phone] (pager)  Other days, night and weekend: [phone]
Recent diagnosis  - Echo from [place] showing moderate pulmonary hypertension  - Repeat TTE here is pending
Recent diagnosis, possibly from undiagnosed sleep apnea.   - Echo from [place] showing moderate pulmonary hypertension  - Repeat TTE here is pending  -Patient would like to see cardiology here after the echo is done.   -Needs outpatient sleep study.

## 2020-11-25 NOTE — PROGRESS NOTE ADULT - PROBLEM SELECTOR PROBLEM 4
Chronic diastolic heart failure

## 2020-11-25 NOTE — PROGRESS NOTE ADULT - PROBLEM SELECTOR PROBLEM 7
Stage 3 chronic kidney disease, unspecified whether stage 3a or 3b CKD

## 2020-11-25 NOTE — PROGRESS NOTE ADULT - PROBLEM SELECTOR PLAN 2
-about goal, less than 130/80 with no medications
-about goal, less than 130/80 with no medications
Patient on home O2 likely secondary to cardiac and pulmonary etiologies (pulmonary HTN and asthma)  - Continue supplemental O2
Patient on home O2 likely secondary to cardiac and pulmonary etiologies (pulmonary HTN and asthma)  - supplemental O2
Patient on home O2 likely secondary to cardiac and pulmonary etiologies (pulmonary HTN and asthma)  - supplemental O2, wean as tolerated.  -Needs a sleep study outpatient.
-about goal, less than 130/80 with no medications
Patient on home O2 likely secondary to cardiac and pulmonary etiologies (pulmonary HTN and asthma)  - supplemental O2, wean as tolerated.
Patient on home O2 likely secondary to cardiac and pulmonary etiologies (pulmonary HTN and asthma)  - supplemental O2, wean as tolerated.  -Needs a sleep study outpatient.

## 2020-11-25 NOTE — PROGRESS NOTE ADULT - PROBLEM SELECTOR PLAN 1
History of chronic wound and charcot foot with concern for overlying infection due to new erythema and reported drainage  - c/w Unasyn, per ID  - Podiatry consult - debrided at bedside and cultures obtained. Follow up wound cultures - Staph aureus and contaminant.   - DVT US - unable to visualize R common femoral, but otherwise negative  - repeat arterial studies ordered which show mild decrease in flow, appreciate vascular recs   -MRI foot not done as machine unable to accommodate patient, can get CT of the foot with contrast, once cleared by renal (consult placed) - patient however now refuses IV contrast over kidney concerns.   -NM bone scan done, concerning for midfoot osteomyelitis, now s/p bone biopsy  -f/u bone biopsy results which will determine the total length of abx treatment  -prelim bone biopsy cultures with no growth. Bone path biopsy results neg as well  -will f/u with ID, likely no abx  -continue with wound care to the area and outpatient vascular follow up

## 2020-11-25 NOTE — PROGRESS NOTE ADULT - NUTRITIONAL ASSESSMENT
This patient has been assessed with a concern for Malnutrition and has been determined to have a diagnosis/diagnoses of Morbid obesity/BMI > 40.    This patient is being managed with:   Diet Consistent Carbohydrate Renal w/Evening Snack-  Entered: Nov 9 2020 11:10PM    The following pending diet order is being considered for treatment of Morbid obesity/BMI > 40:  Diet Consistent Carbohydrate w/Evening Snack-  DASH/TLC {Sodium & Cholesterol Restricted} (DASH)  Edward(7 Gm Arginine/7 Gm Glut/1.2 Gm HMB     Qty per Day:  2  Entered: Nov 12 2020  3:28PM  
This patient has been assessed with a concern for Malnutrition and has been determined to have a diagnosis/diagnoses of Morbid obesity/BMI > 40.    This patient is being managed with:   Diet Consistent Carbohydrate w/Evening Snack-  DASH/TLC {Sodium & Cholesterol Restricted} (DASH)  No Concentrated Potassium  Entered: Nov 14 2020  5:45PM    Diet Consistent Carbohydrate w/Evening Snack-  DASH/TLC {Sodium & Cholesterol Restricted} (DASH)  Edward(7 Gm Arginine/7 Gm Glut/1.2 Gm HMB     Qty per Day:  2  Entered: Nov 12 2020  3:28PM    The following pending diet order is being considered for treatment of Morbid obesity/BMI > 40:null
This patient has been assessed with a concern for Malnutrition and has been determined to have a diagnosis/diagnoses of Morbid obesity/BMI > 40.    This patient is being managed with:   Diet Consistent Carbohydrate w/Evening Snack-  DASH/TLC {Sodium & Cholesterol Restricted} (DASH)  No Concentrated Potassium  Entered: Nov 14 2020  5:45PM    Diet Consistent Carbohydrate w/Evening Snack-  DASH/TLC {Sodium & Cholesterol Restricted} (DASH)  Edward(7 Gm Arginine/7 Gm Glut/1.2 Gm HMB     Qty per Day:  2  Entered: Nov 12 2020  3:28PM    The following pending diet order is being considered for treatment of Morbid obesity/BMI > 40:null
This patient has been assessed with a concern for Malnutrition and has been determined to have a diagnosis/diagnoses of Morbid obesity/BMI > 40.    This patient is being managed with:   Diet Consistent Carbohydrate Renal w/Evening Snack-  Entered: Nov 9 2020 11:10PM    The following pending diet order is being considered for treatment of Morbid obesity/BMI > 40:  Diet Consistent Carbohydrate w/Evening Snack-  DASH/TLC {Sodium & Cholesterol Restricted} (DASH)  Edward(7 Gm Arginine/7 Gm Glut/1.2 Gm HMB     Qty per Day:  2  Entered: Nov 12 2020  3:28PM  
This patient has been assessed with a concern for Malnutrition and has been determined to have a diagnosis/diagnoses of Morbid obesity/BMI > 40.    This patient is being managed with:   Diet Consistent Carbohydrate w/Evening Snack-  DASH/TLC {Sodium & Cholesterol Restricted} (DASH)  Entered: Nov 18 2020 11:29AM    
This patient has been assessed with a concern for Malnutrition and has been determined to have a diagnosis/diagnoses of Morbid obesity/BMI > 40.    This patient is being managed with:   Diet Consistent Carbohydrate w/Evening Snack-  DASH/TLC {Sodium & Cholesterol Restricted} (DASH)  No Concentrated Potassium  Entered: Nov 14 2020  5:45PM    Diet Consistent Carbohydrate w/Evening Snack-  DASH/TLC {Sodium & Cholesterol Restricted} (DASH)  Edward(7 Gm Arginine/7 Gm Glut/1.2 Gm HMB     Qty per Day:  2  Entered: Nov 12 2020  3:28PM    The following pending diet order is being considered for treatment of Morbid obesity/BMI > 40:null
This patient has been assessed with a concern for Malnutrition and has been determined to have a diagnosis/diagnoses of Morbid obesity/BMI > 40.    This patient is being managed with:   Diet Consistent Carbohydrate w/Evening Snack-  DASH/TLC {Sodium & Cholesterol Restricted} (DASH)  Entered: Nov 18 2020 11:29AM    
This patient has been assessed with a concern for Malnutrition and has been determined to have a diagnosis/diagnoses of Morbid obesity/BMI > 40.    This patient is being managed with:   Diet Consistent Carbohydrate Renal w/Evening Snack-  Entered: Nov 9 2020 11:10PM    The following pending diet order is being considered for treatment of Morbid obesity/BMI > 40:  Diet Consistent Carbohydrate w/Evening Snack-  DASH/TLC {Sodium & Cholesterol Restricted} (DASH)  Edward(7 Gm Arginine/7 Gm Glut/1.2 Gm HMB     Qty per Day:  2  Entered: Nov 12 2020  3:28PM  
This patient has been assessed with a concern for Malnutrition and has been determined to have a diagnosis/diagnoses of Morbid obesity/BMI > 40.    This patient is being managed with:   Diet Consistent Carbohydrate w/Evening Snack-  DASH/TLC {Sodium & Cholesterol Restricted} (DASH)  Entered: Nov 18 2020 11:29AM    
This patient has been assessed with a concern for Malnutrition and has been determined to have a diagnosis/diagnoses of Morbid obesity/BMI > 40.    This patient is being managed with:   Diet Consistent Carbohydrate w/Evening Snack-  DASH/TLC {Sodium & Cholesterol Restricted} (DASH)  Entered: Nov 18 2020 11:29AM    Diet Consistent Carbohydrate w/Evening Snack-  DASH/TLC {Sodium & Cholesterol Restricted} (DASH)  No Concentrated Potassium  Entered: Nov 14 2020  5:45PM    Diet Consistent Carbohydrate w/Evening Snack-  DASH/TLC {Sodium & Cholesterol Restricted} (DASH)  Edward(7 Gm Arginine/7 Gm Glut/1.2 Gm HMB     Qty per Day:  2  Entered: Nov 12 2020  3:28PM    The following pending diet order is being considered for treatment of Morbid obesity/BMI > 40:null
This patient has been assessed with a concern for Malnutrition and has been determined to have a diagnosis/diagnoses of Morbid obesity/BMI > 40.    This patient is being managed with:   Diet Consistent Carbohydrate w/Evening Snack-  DASH/TLC {Sodium & Cholesterol Restricted} (DASH)  No Concentrated Potassium  Entered: Nov 14 2020  5:45PM    Diet Consistent Carbohydrate w/Evening Snack-  DASH/TLC {Sodium & Cholesterol Restricted} (DASH)  Edward(7 Gm Arginine/7 Gm Glut/1.2 Gm HMB     Qty per Day:  2  Entered: Nov 12 2020  3:28PM    The following pending diet order is being considered for treatment of Morbid obesity/BMI > 40:null
This patient has been assessed with a concern for Malnutrition and has been determined to have a diagnosis/diagnoses of Morbid obesity/BMI > 40.    This patient is being managed with:   Diet Consistent Carbohydrate w/Evening Snack-  DASH/TLC {Sodium & Cholesterol Restricted} (DASH)  No Concentrated Potassium  Entered: Nov 14 2020  5:45PM    Diet Consistent Carbohydrate w/Evening Snack-  DASH/TLC {Sodium & Cholesterol Restricted} (DASH)  Edward(7 Gm Arginine/7 Gm Glut/1.2 Gm HMB     Qty per Day:  2  Entered: Nov 12 2020  3:28PM    The following pending diet order is being considered for treatment of Morbid obesity/BMI > 40:null
This patient has been assessed with a concern for Malnutrition and has been determined to have a diagnosis/diagnoses of Morbid obesity/BMI > 40.    This patient is being managed with:   Diet Consistent Carbohydrate w/Evening Snack-  DASH/TLC {Sodium & Cholesterol Restricted} (DASH)  Entered: Nov 18 2020 11:29AM    

## 2020-11-25 NOTE — PROGRESS NOTE ADULT - PROBLEM SELECTOR PROBLEM 1
Diabetic foot infection
Type 2 diabetes mellitus with hyperglycemia, with long-term current use of insulin
Type 2 diabetes mellitus with hyperglycemia, with long-term current use of insulin
Diabetic foot infection
Diabetic foot infection
Type 2 diabetes mellitus with hyperglycemia, with long-term current use of insulin

## 2020-11-25 NOTE — PROGRESS NOTE ADULT - PROBLEM SELECTOR PROBLEM 2
Essential hypertension
Essential hypertension
Hypoxia
Essential hypertension
Hypoxia
Hypoxia

## 2020-11-25 NOTE — PROGRESS NOTE ADULT - SUBJECTIVE AND OBJECTIVE BOX
Patient is a 65y old  Female who presents with a chief complaint of Right foot infection (19 Nov 2020 16:39)    SUBJECTIVE / OVERNIGHT EVENTS: no acute events overnight     MEDICATIONS  (STANDING):  albuterol/ipratropium for Nebulization. 3 milliLiter(s) Nebulizer every 6 hours  AQUAPHOR (petrolatum Ointment) 1 Application(s) Topical two times a day  artificial tears (preservative free) Ophthalmic Solution 1 Drop(s) Both EYES every 6 hours  aspirin enteric coated 81 milliGRAM(s) Oral daily  atorvastatin 80 milliGRAM(s) Oral at bedtime  chlorhexidine 2% Cloths 1 Application(s) Topical daily  collagenase Ointment 1 Application(s) Topical daily  dextrose 5%. 1000 milliLiter(s) (50 mL/Hr) IV Continuous <Continuous>  dextrose 50% Injectable 12.5 Gram(s) IV Push once  dextrose 50% Injectable 25 Gram(s) IV Push once  dextrose 50% Injectable 25 Gram(s) IV Push once  furosemide    Tablet 40 milliGRAM(s) Oral daily  heparin   Injectable 7500 Unit(s) SubCutaneous every 8 hours  insulin glargine Injectable (LANTUS) 60 Unit(s) SubCutaneous every morning  insulin glargine Injectable (LANTUS) 70 Unit(s) SubCutaneous at bedtime  insulin lispro (ADMELOG) corrective regimen sliding scale   SubCutaneous at bedtime  insulin lispro (ADMELOG) corrective regimen sliding scale   SubCutaneous three times a day before meals  insulin lispro Injectable (ADMELOG) 15 Unit(s) SubCutaneous three times a day before meals  multivitamin 1 Tablet(s) Oral daily  nystatin Powder 1 Application(s) Topical two times a day  oxyCODONE  ER Tablet 80 milliGRAM(s) Oral every 12 hours  polyethylene glycol 3350 17 Gram(s) Oral daily  senna 2 Tablet(s) Oral at bedtime    MEDICATIONS  (PRN):  clonazePAM  Tablet 0.5 milliGRAM(s) Oral at bedtime PRN ancxiety  dextrose 40% Gel 15 Gram(s) Oral once PRN Blood Glucose LESS THAN 70 milliGRAM(s)/deciliter  glucagon  Injectable 1 milliGRAM(s) IntraMuscular once PRN Glucose LESS THAN 70 milligrams/deciliter  oxyCODONE    IR 5 milliGRAM(s) Oral every 8 hours PRN Breakthrough Pain      Vital Signs Last 24 Hrs  T(C): 36.7 (25 Nov 2020 11:42), Max: 36.8 (24 Nov 2020 23:39)  T(F): 98 (25 Nov 2020 11:42), Max: 98.2 (24 Nov 2020 23:39)  HR: 77 (25 Nov 2020 11:42) (72 - 87)  BP: 123/70 (25 Nov 2020 11:42) (117/69 - 132/64)  BP(mean): --  RR: 18 (25 Nov 2020 11:42) (18 - 19)  SpO2: 97% (25 Nov 2020 11:42) (93% - 98%)  CAPILLARY BLOOD GLUCOSE      POCT Blood Glucose.: 154 mg/dL (25 Nov 2020 08:01)  POCT Blood Glucose.: 156 mg/dL (24 Nov 2020 21:09)  POCT Blood Glucose.: 114 mg/dL (24 Nov 2020 17:10)    I&O's Summary    24 Nov 2020 07:01  -  25 Nov 2020 07:00  --------------------------------------------------------  IN: 830 mL / OUT: 1400 mL / NET: -570 mL    25 Nov 2020 07:01  -  25 Nov 2020 11:57  --------------------------------------------------------  IN: 250 mL / OUT: 1200 mL / NET: -950 mL        PHYSICAL EXAM:  GENERAL: NAD  EYES: conjunctiva and sclera clear  CHEST/LUNG: Clear to auscultation bilaterally; No wheeze  HEART: +S1/S2, reg   ABDOMEN: Soft, Nontender, Nondistended  EXTREMITIES: b/l LE edema, with R foot dressing in place   PSYCH: AAOx3    LABS:    11-24    140  |  99  |  32<H>  ----------------------------<  136<H>  4.3   |  28  |  1.70<H>    Ca    10.1      24 Nov 2020 08:59

## 2020-11-25 NOTE — PROGRESS NOTE ADULT - PROBLEM SELECTOR PLAN 4
Recent diagnosis  - Echo from [place] showed EF off 55-60%, grade II diastolic dysfunction  - Stress test from [place] was negative for ischemia  - Atorvastatin 80 mg (home on Rosuvastatin)  - will resume 40mg PO lasix daily   - monitor volume status  - f/u with cards as an outpatient
Recent diagnosis  - Echo from [place] showed EF off 55-60%, grade II diastolic dysfunction  - Stress test from [place] was negative for ischemia  - Atorvastatin 80 mg (home on Rosuvastatin)  - Hold Lasix pending improvement and stabilization of BP, BP remains soft   - monitor volume status
Recent diagnosis  - Echo from [place] showed EF off 55-60%, grade II diastolic dysfunction  - Stress test from [place] was negative for ischemia  - Atorvastatin 80 mg (home on Rosuvastatin)  - will resume 40mg PO lasix daily   - monitor volume status, appears compensated today   - f/u with cards as an outpatient
Recent diagnosis  - Echo from [place] showed EF off 55-60%, grade II diastolic dysfunction  - Stress test from [place] was negative for ischemia  - Atorvastatin 80 mg (home on Rosuvastatin)  - will resume 40mg PO lasix daily   - monitor volume status, appears compensated today   - f/u with cards as an outpatient
Recent diagnosis  - Echo from [place] showed EF off 55-60%, grade II diastolic dysfunction  - Stress test from [place] was negative for ischemia  - Atorvastatin 80 mg (home on Rosuvastatin)  - Hold Lasix pending improvement and stabilization of renal function  - monitor volume status
Recent diagnosis  - Echo from [place] showed EF off 55-60%, grade II diastolic dysfunction  - Stress test from [place] was negative for ischemia  - Atorvastatin 80 mg (home on Rosuvastatin)  - will resume 40mg PO lasix daily   - monitor volume status  - f/u with cards as an outpatient
Recent diagnosis  - Echo from [place] showed EF off 55-60%, grade II diastolic dysfunction  - Stress test from [place] was negative for ischemia  - Atorvastatin 80 mg (home on Rosuvastatin)  - Hold Lasix pending improvement and stabilization of BP, BP remains soft   - monitor volume status
Recent diagnosis  - Echo from [place] showed EF off 55-60%, grade II diastolic dysfunction  - Stress test from [place] was negative for ischemia  - Atorvastatin 80 mg (home on Rosuvastatin)  - Hold Lasix pending improvement and stabilization of BP, BP remains soft   - monitor volume status
Recent diagnosis  - Echo from [place] showed EF off 55-60%, grade II diastolic dysfunction  - Stress test from [place] was negative for ischemia  - Atorvastatin 80 mg (home on Rosuvastatin)  - Hold Lasix pending improvement and stabilization of BP, BP remains soft   - monitor volume status
Recent diagnosis  - Echo from [place] showed EF off 55-60%, grade II diastolic dysfunction  - Stress test from [place] was negative for ischemia  - Atorvastatin 80 mg (home on Rosuvastatin)  - Hold Lasix pending improvement and stabilization of renal function  - monitor volume status  -Patient would like to see cardiology here after echo is done.
Recent diagnosis  - Echo from [place] showed EF off 55-60%, grade II diastolic dysfunction  - Stress test from [place] was negative for ischemia  - Atorvastatin 80 mg (home on Rosuvastatin)  - will resume 40mg PO lasix daily   - monitor volume status  - f/u with cards as an outpatient
Recent diagnosis  - Echo from [place] showed EF off 55-60%, grade II diastolic dysfunction  - Stress test from [place] was negative for ischemia  - Atorvastatin 80 mg (home on Rosuvastatin)  - will resume 40mg PO lasix daily   - monitor volume status, appears compensated today   - f/u with cards as an outpatient
Recent diagnosis  - Echo from [place] showed EF off 55-60%, grade II diastolic dysfunction  - Stress test from [place] was negative for ischemia  - Atorvastatin 80 mg (home on Rosuvastatin)  - will resume 40mg PO lasix daily   - monitor volume status  - f/u with cards as an outpatient
Recent diagnosis  - Echo from [place] showed EF off 55-60%, grade II diastolic dysfunction  - Stress test from [place] was negative for ischemia  - Atorvastatin 80 mg (home on Rosuvastatin)  - Hold Lasix pending improvement and stabilization of renal function  - monitor volume status
Recent diagnosis  - Echo from [place] showed EF off 55-60%, grade II diastolic dysfunction  - Stress test from [place] was negative for ischemia  - Atorvastatin 80 mg (home on Rosuvastatin)  - Hold Lasix pending improvement and stabilization of renal function  - monitor volume status
Recent diagnosis  - Echo from [place] showed EF off 55-60%, grade II diastolic dysfunction  - Stress test from [place] was negative for ischemia  - Atorvastatin 80 mg (home on Rosuvastatin)  - Hold Lasix pending improvement and stabilization of renal function  - monitor volume status  -Patient would like to see cardiology here after echo is done.

## 2020-11-25 NOTE — PROGRESS NOTE ADULT - PROBLEM SELECTOR PROBLEM 3
Dyslipidemia
Dyslipidemia
Pulmonary hypertension
Dyslipidemia
Pulmonary hypertension
Pulmonary hypertension

## 2020-11-25 NOTE — PROGRESS NOTE ADULT - ATTENDING COMMENTS
Yrn Tavares MD  Division of [facility] Medicine  Cell: [phone]  Pager: [phone]  Office: [phone]
Yrn Tavares MD  Division of [facility] Medicine  Cell: [phone]  Pager: [phone]  Office: [phone]
Likely can d/c home today  CM to set up transportation, d/c home with home care.  F/U as above.     Total discharge time: 45 mins
Patient reporting pruritus in the vaginal area which she says she experiences while on antibiotics. Declined vaginal exam. Will order one dose of diflucan.
awaiting path report

## 2020-11-25 NOTE — PROGRESS NOTE ADULT - PROBLEM SELECTOR PLAN 8
On home oxycodone ER (I-STOP)  - Continue oxycodone ER 80 mg  - PRN oxycodone 5 mg for breakthrough  - Klonopin 0.5 mg PRN at bedtime
On home oxycodone ER (I-STOP)  - Continue oxycodone ER 80 mg Q12H.  - PRN oxycodone 5 mg for breakthrough  - Klonopin 0.5 mg PRN at bedtime

## 2020-11-25 NOTE — PROGRESS NOTE ADULT - PROBLEM SELECTOR PLAN 7
- Renally dose medications  - Currently on vancomycin for concern for MRSA  - Trend Cr  - Monitor I&O
- Renally dose medications  - Trend Cr  - Monitor I&O  -F/u renal recs.
- Renally dose medications  - Trend Cr  - Monitor I&O  -F/u renal recs.  -Hold fluids but avoid lasix for now.

## 2020-11-25 NOTE — PROGRESS NOTE ADULT - PROBLEM SELECTOR PLAN 6
- Continue home Lantus 72 units qAM and qHS  - Continue home Lispro 15 units premeal  - SSI
- Continue home Lantus 72 units qAM and qHS  - Continue home Lispro 15 units premeal  - SSI  -A1C is elevated, endocrine consult placed
- Continue home Lantus 72 units qAM and qHS (endo increased to 80units BID)  - Continue home Lispro 15 units premeal (endo increased to 22units with meals)  - SSI  -A1C is elevated, endocrine consulted  -Moderate SS.
- Continue home Lantus 72 units qAM and qHS (endo increased to 80units BID)  - Continue home Lispro 15 units premeal (endo increased to 22units with meals)  - SSI  -A1C is elevated, endocrine consulted
- Continue home Lantus 72 units qAM and qHS (endo increased to 80units BID)  - Continue home Lispro 15 units premeal (endo increased to 22units with meals)  - SSI  -A1C is elevated, endocrine consulted  -Moderate SS.

## 2020-11-25 NOTE — PROGRESS NOTE ADULT - PROBLEM SELECTOR PROBLEM 6
Type 2 diabetes mellitus with foot ulcer, with long-term current use of insulin

## 2020-11-25 NOTE — PROGRESS NOTE ADULT - PROBLEM SELECTOR PROBLEM 8
Chronic low back pain, unspecified back pain laterality, unspecified whether sciatica present

## 2020-11-25 NOTE — PROGRESS NOTE ADULT - PROBLEM SELECTOR PLAN 5
- Sam q6hr

## 2020-11-27 ENCOUNTER — NON-APPOINTMENT (OUTPATIENT)
Age: 65
End: 2020-11-27

## 2020-12-01 ENCOUNTER — APPOINTMENT (OUTPATIENT)
Dept: INTERNAL MEDICINE | Facility: CLINIC | Age: 65
End: 2020-12-01
Payer: MEDICARE

## 2020-12-01 PROCEDURE — 99442: CPT

## 2020-12-09 ENCOUNTER — APPOINTMENT (OUTPATIENT)
Dept: INTERNAL MEDICINE | Facility: CLINIC | Age: 65
End: 2020-12-09
Payer: MEDICARE

## 2020-12-09 PROCEDURE — 99442: CPT

## 2020-12-14 ENCOUNTER — RX RENEWAL (OUTPATIENT)
Age: 65
End: 2020-12-14

## 2020-12-23 PROBLEM — Z87.440 HISTORY OF URINARY TRACT INFECTION: Status: RESOLVED | Noted: 2019-07-12 | Resolved: 2020-12-23

## 2021-01-06 ENCOUNTER — INPATIENT (INPATIENT)
Facility: HOSPITAL | Age: 66
LOS: 3 days | Discharge: HOME | DRG: 603 | End: 2021-01-10
Attending: STUDENT IN AN ORGANIZED HEALTH CARE EDUCATION/TRAINING PROGRAM | Admitting: HOSPITALIST
Payer: MEDICARE

## 2021-01-06 ENCOUNTER — NON-APPOINTMENT (OUTPATIENT)
Age: 66
End: 2021-01-06

## 2021-01-06 VITALS
HEART RATE: 84 BPM | DIASTOLIC BLOOD PRESSURE: 55 MMHG | SYSTOLIC BLOOD PRESSURE: 121 MMHG | WEIGHT: 293 LBS | RESPIRATION RATE: 16 BRPM | TEMPERATURE: 98 F | OXYGEN SATURATION: 99 % | HEIGHT: 63 IN

## 2021-01-06 DIAGNOSIS — M54.9 DORSALGIA, UNSPECIFIED: ICD-10-CM

## 2021-01-06 DIAGNOSIS — Z90.49 ACQUIRED ABSENCE OF OTHER SPECIFIED PARTS OF DIGESTIVE TRACT: Chronic | ICD-10-CM

## 2021-01-06 DIAGNOSIS — Z98.890 OTHER SPECIFIED POSTPROCEDURAL STATES: Chronic | ICD-10-CM

## 2021-01-06 DIAGNOSIS — Z90.710 ACQUIRED ABSENCE OF BOTH CERVIX AND UTERUS: Chronic | ICD-10-CM

## 2021-01-06 DIAGNOSIS — E11.9 TYPE 2 DIABETES MELLITUS WITHOUT COMPLICATIONS: ICD-10-CM

## 2021-01-06 DIAGNOSIS — L08.9 LOCAL INFECTION OF THE SKIN AND SUBCUTANEOUS TISSUE, UNSPECIFIED: ICD-10-CM

## 2021-01-06 DIAGNOSIS — I50.9 HEART FAILURE, UNSPECIFIED: ICD-10-CM

## 2021-01-06 DIAGNOSIS — Z29.9 ENCOUNTER FOR PROPHYLACTIC MEASURES, UNSPECIFIED: ICD-10-CM

## 2021-01-06 DIAGNOSIS — L03.115 CELLULITIS OF RIGHT LOWER LIMB: ICD-10-CM

## 2021-01-06 DIAGNOSIS — E78.5 HYPERLIPIDEMIA, UNSPECIFIED: ICD-10-CM

## 2021-01-06 DIAGNOSIS — J45.909 UNSPECIFIED ASTHMA, UNCOMPLICATED: ICD-10-CM

## 2021-01-06 LAB
ALBUMIN SERPL ELPH-MCNC: 4 G/DL — SIGNIFICANT CHANGE UP (ref 3.3–5)
ALP SERPL-CCNC: 96 U/L — SIGNIFICANT CHANGE UP (ref 40–120)
ALT FLD-CCNC: 14 U/L — SIGNIFICANT CHANGE UP (ref 10–45)
ANION GAP SERPL CALC-SCNC: 11 MMOL/L — SIGNIFICANT CHANGE UP (ref 5–17)
APPEARANCE UR: CLEAR — SIGNIFICANT CHANGE UP
APTT BLD: 26.9 SEC — LOW (ref 27.5–35.5)
AST SERPL-CCNC: 11 U/L — SIGNIFICANT CHANGE UP (ref 10–40)
BACTERIA # UR AUTO: NEGATIVE — SIGNIFICANT CHANGE UP
BASE EXCESS BLDV CALC-SCNC: 4.2 MMOL/L — HIGH (ref -2–2)
BASOPHILS # BLD AUTO: 0.06 K/UL — SIGNIFICANT CHANGE UP (ref 0–0.2)
BASOPHILS NFR BLD AUTO: 0.6 % — SIGNIFICANT CHANGE UP (ref 0–2)
BILIRUB SERPL-MCNC: 0.5 MG/DL — SIGNIFICANT CHANGE UP (ref 0.2–1.2)
BILIRUB UR-MCNC: NEGATIVE — SIGNIFICANT CHANGE UP
BLD GP AB SCN SERPL QL: NEGATIVE — SIGNIFICANT CHANGE UP
BUN SERPL-MCNC: 29 MG/DL — HIGH (ref 7–23)
CA-I SERPL-SCNC: SIGNIFICANT CHANGE UP MMOL/L (ref 1.12–1.3)
CALCIUM SERPL-MCNC: 9.6 MG/DL — SIGNIFICANT CHANGE UP (ref 8.4–10.5)
CHLORIDE BLDV-SCNC: SIGNIFICANT CHANGE UP MMOL/L (ref 96–108)
CHLORIDE SERPL-SCNC: 102 MMOL/L — SIGNIFICANT CHANGE UP (ref 96–108)
CO2 BLDV-SCNC: 32 MMOL/L — HIGH (ref 22–30)
CO2 SERPL-SCNC: 28 MMOL/L — SIGNIFICANT CHANGE UP (ref 22–31)
COLOR SPEC: SIGNIFICANT CHANGE UP
CREAT SERPL-MCNC: 1.44 MG/DL — HIGH (ref 0.5–1.3)
CRP SERPL-MCNC: 1.38 MG/DL — HIGH (ref 0–0.4)
DIFF PNL FLD: NEGATIVE — SIGNIFICANT CHANGE UP
EOSINOPHIL # BLD AUTO: 0.17 K/UL — SIGNIFICANT CHANGE UP (ref 0–0.5)
EOSINOPHIL NFR BLD AUTO: 1.7 % — SIGNIFICANT CHANGE UP (ref 0–6)
EPI CELLS # UR: 0 /HPF — SIGNIFICANT CHANGE UP
GAS PNL BLDV: SIGNIFICANT CHANGE UP
GAS PNL BLDV: SIGNIFICANT CHANGE UP
GAS PNL BLDV: SIGNIFICANT CHANGE UP MMOL/L (ref 135–145)
GLUCOSE BLDC GLUCOMTR-MCNC: 158 MG/DL — HIGH (ref 70–99)
GLUCOSE BLDV-MCNC: 74 MG/DL — SIGNIFICANT CHANGE UP (ref 70–99)
GLUCOSE SERPL-MCNC: 78 MG/DL — SIGNIFICANT CHANGE UP (ref 70–99)
GLUCOSE UR QL: NEGATIVE — SIGNIFICANT CHANGE UP
HCO3 BLDV-SCNC: 30 MMOL/L — HIGH (ref 21–29)
HCT VFR BLD CALC: 40.6 % — SIGNIFICANT CHANGE UP (ref 34.5–45)
HCT VFR BLDA CALC: 29 % — LOW (ref 39–50)
HGB BLD CALC-MCNC: 9.4 G/DL — LOW (ref 11.5–15.5)
HGB BLD-MCNC: 12.5 G/DL — SIGNIFICANT CHANGE UP (ref 11.5–15.5)
HYALINE CASTS # UR AUTO: 1 /LPF — SIGNIFICANT CHANGE UP (ref 0–2)
IMM GRANULOCYTES NFR BLD AUTO: 1 % — SIGNIFICANT CHANGE UP (ref 0–1.5)
INR BLD: 1.02 RATIO — SIGNIFICANT CHANGE UP (ref 0.88–1.16)
KETONES UR-MCNC: NEGATIVE — SIGNIFICANT CHANGE UP
LACTATE BLDV-MCNC: 1.1 MMOL/L — SIGNIFICANT CHANGE UP (ref 0.7–2)
LACTATE BLDV-MCNC: 2.2 MMOL/L — HIGH (ref 0.7–2)
LEUKOCYTE ESTERASE UR-ACNC: NEGATIVE — SIGNIFICANT CHANGE UP
LYMPHOCYTES # BLD AUTO: 1.73 K/UL — SIGNIFICANT CHANGE UP (ref 1–3.3)
LYMPHOCYTES # BLD AUTO: 17.2 % — SIGNIFICANT CHANGE UP (ref 13–44)
MCHC RBC-ENTMCNC: 29 PG — SIGNIFICANT CHANGE UP (ref 27–34)
MCHC RBC-ENTMCNC: 30.8 GM/DL — LOW (ref 32–36)
MCV RBC AUTO: 94.2 FL — SIGNIFICANT CHANGE UP (ref 80–100)
MONOCYTES # BLD AUTO: 0.93 K/UL — HIGH (ref 0–0.9)
MONOCYTES NFR BLD AUTO: 9.2 % — SIGNIFICANT CHANGE UP (ref 2–14)
NEUTROPHILS # BLD AUTO: 7.07 K/UL — SIGNIFICANT CHANGE UP (ref 1.8–7.4)
NEUTROPHILS NFR BLD AUTO: 70.3 % — SIGNIFICANT CHANGE UP (ref 43–77)
NITRITE UR-MCNC: NEGATIVE — SIGNIFICANT CHANGE UP
NRBC # BLD: 0 /100 WBCS — SIGNIFICANT CHANGE UP (ref 0–0)
PCO2 BLDV: 56 MMHG — HIGH (ref 35–50)
PH BLDV: 7.35 — SIGNIFICANT CHANGE UP (ref 7.35–7.45)
PH UR: 5.5 — SIGNIFICANT CHANGE UP (ref 5–8)
PLATELET # BLD AUTO: 155 K/UL — SIGNIFICANT CHANGE UP (ref 150–400)
PO2 BLDV: 80 MMHG — HIGH (ref 25–45)
POTASSIUM BLDV-SCNC: SIGNIFICANT CHANGE UP MMOL/L (ref 3.5–5.3)
POTASSIUM SERPL-MCNC: 4.3 MMOL/L — SIGNIFICANT CHANGE UP (ref 3.5–5.3)
POTASSIUM SERPL-SCNC: 4.3 MMOL/L — SIGNIFICANT CHANGE UP (ref 3.5–5.3)
PROT SERPL-MCNC: 6.7 G/DL — SIGNIFICANT CHANGE UP (ref 6–8.3)
PROT UR-MCNC: SIGNIFICANT CHANGE UP
PROTHROM AB SERPL-ACNC: 12.2 SEC — SIGNIFICANT CHANGE UP (ref 10.6–13.6)
RBC # BLD: 4.31 M/UL — SIGNIFICANT CHANGE UP (ref 3.8–5.2)
RBC # FLD: 13.5 % — SIGNIFICANT CHANGE UP (ref 10.3–14.5)
RBC CASTS # UR COMP ASSIST: 1 /HPF — SIGNIFICANT CHANGE UP (ref 0–4)
RH IG SCN BLD-IMP: POSITIVE — SIGNIFICANT CHANGE UP
SAO2 % BLDV: 96 % — HIGH (ref 67–88)
SARS-COV-2 RNA SPEC QL NAA+PROBE: SIGNIFICANT CHANGE UP
SODIUM SERPL-SCNC: 141 MMOL/L — SIGNIFICANT CHANGE UP (ref 135–145)
SP GR SPEC: 1.02 — SIGNIFICANT CHANGE UP (ref 1.01–1.02)
UROBILINOGEN FLD QL: NEGATIVE — SIGNIFICANT CHANGE UP
WBC # BLD: 10.06 K/UL — SIGNIFICANT CHANGE UP (ref 3.8–10.5)
WBC # FLD AUTO: 10.06 K/UL — SIGNIFICANT CHANGE UP (ref 3.8–10.5)
WBC UR QL: 0 /HPF — SIGNIFICANT CHANGE UP (ref 0–5)

## 2021-01-06 PROCEDURE — 93010 ELECTROCARDIOGRAM REPORT: CPT | Mod: 59

## 2021-01-06 PROCEDURE — 99223 1ST HOSP IP/OBS HIGH 75: CPT | Mod: GC

## 2021-01-06 PROCEDURE — 73620 X-RAY EXAM OF FOOT: CPT | Mod: 26,RT

## 2021-01-06 PROCEDURE — 99285 EMERGENCY DEPT VISIT HI MDM: CPT | Mod: 25

## 2021-01-06 RX ORDER — BUDESONIDE AND FORMOTEROL FUMARATE DIHYDRATE 160; 4.5 UG/1; UG/1
2 AEROSOL RESPIRATORY (INHALATION) DAILY
Refills: 0 | Status: DISCONTINUED | OUTPATIENT
Start: 2021-01-06 | End: 2021-01-10

## 2021-01-06 RX ORDER — VANCOMYCIN HCL 1 G
1000 VIAL (EA) INTRAVENOUS ONCE
Refills: 0 | Status: DISCONTINUED | OUTPATIENT
Start: 2021-01-06 | End: 2021-01-06

## 2021-01-06 RX ORDER — SODIUM CHLORIDE 9 MG/ML
1000 INJECTION, SOLUTION INTRAVENOUS
Refills: 0 | Status: DISCONTINUED | OUTPATIENT
Start: 2021-01-06 | End: 2021-01-10

## 2021-01-06 RX ORDER — ATORVASTATIN CALCIUM 80 MG/1
80 TABLET, FILM COATED ORAL AT BEDTIME
Refills: 0 | Status: DISCONTINUED | OUTPATIENT
Start: 2021-01-06 | End: 2021-01-10

## 2021-01-06 RX ORDER — PIPERACILLIN AND TAZOBACTAM 4; .5 G/20ML; G/20ML
3.38 INJECTION, POWDER, LYOPHILIZED, FOR SOLUTION INTRAVENOUS EVERY 8 HOURS
Refills: 0 | Status: DISCONTINUED | OUTPATIENT
Start: 2021-01-06 | End: 2021-01-07

## 2021-01-06 RX ORDER — SODIUM CHLORIDE 9 MG/ML
1000 INJECTION INTRAMUSCULAR; INTRAVENOUS; SUBCUTANEOUS ONCE
Refills: 0 | Status: COMPLETED | OUTPATIENT
Start: 2021-01-06 | End: 2021-01-06

## 2021-01-06 RX ORDER — PIPERACILLIN AND TAZOBACTAM 4; .5 G/20ML; G/20ML
3.38 INJECTION, POWDER, LYOPHILIZED, FOR SOLUTION INTRAVENOUS ONCE
Refills: 0 | Status: COMPLETED | OUTPATIENT
Start: 2021-01-06 | End: 2021-01-06

## 2021-01-06 RX ORDER — ONDANSETRON 8 MG/1
4 TABLET, FILM COATED ORAL DAILY
Refills: 0 | Status: DISCONTINUED | OUTPATIENT
Start: 2021-01-06 | End: 2021-01-10

## 2021-01-06 RX ORDER — INSULIN DETEMIR 100/ML (3)
60 INSULIN PEN (ML) SUBCUTANEOUS
Qty: 0 | Refills: 0 | DISCHARGE

## 2021-01-06 RX ORDER — ACETAMINOPHEN 500 MG
650 TABLET ORAL ONCE
Refills: 0 | Status: DISCONTINUED | OUTPATIENT
Start: 2021-01-06 | End: 2021-01-10

## 2021-01-06 RX ORDER — INSULIN GLARGINE 100 [IU]/ML
64 INJECTION, SOLUTION SUBCUTANEOUS
Refills: 0 | Status: DISCONTINUED | OUTPATIENT
Start: 2021-01-06 | End: 2021-01-07

## 2021-01-06 RX ORDER — ALBUTEROL 90 UG/1
3 AEROSOL, METERED ORAL
Qty: 0 | Refills: 0 | DISCHARGE

## 2021-01-06 RX ORDER — INSULIN LISPRO 100/ML
VIAL (ML) SUBCUTANEOUS
Refills: 0 | Status: DISCONTINUED | OUTPATIENT
Start: 2021-01-06 | End: 2021-01-10

## 2021-01-06 RX ORDER — DEXTROSE 50 % IN WATER 50 %
25 SYRINGE (ML) INTRAVENOUS ONCE
Refills: 0 | Status: DISCONTINUED | OUTPATIENT
Start: 2021-01-06 | End: 2021-01-10

## 2021-01-06 RX ORDER — INSULIN DETEMIR 100/ML (3)
70 INSULIN PEN (ML) SUBCUTANEOUS
Qty: 0 | Refills: 0 | DISCHARGE

## 2021-01-06 RX ORDER — DEXTROSE 50 % IN WATER 50 %
12.5 SYRINGE (ML) INTRAVENOUS ONCE
Refills: 0 | Status: DISCONTINUED | OUTPATIENT
Start: 2021-01-06 | End: 2021-01-10

## 2021-01-06 RX ORDER — PIPERACILLIN AND TAZOBACTAM 4; .5 G/20ML; G/20ML
3.38 INJECTION, POWDER, LYOPHILIZED, FOR SOLUTION INTRAVENOUS ONCE
Refills: 0 | Status: DISCONTINUED | OUTPATIENT
Start: 2021-01-06 | End: 2021-01-06

## 2021-01-06 RX ORDER — GLUCAGON INJECTION, SOLUTION 0.5 MG/.1ML
1 INJECTION, SOLUTION SUBCUTANEOUS ONCE
Refills: 0 | Status: DISCONTINUED | OUTPATIENT
Start: 2021-01-06 | End: 2021-01-10

## 2021-01-06 RX ORDER — INSULIN LISPRO 100/ML
12 VIAL (ML) SUBCUTANEOUS
Refills: 0 | Status: DISCONTINUED | OUTPATIENT
Start: 2021-01-06 | End: 2021-01-07

## 2021-01-06 RX ORDER — ALBUTEROL 90 UG/1
2 AEROSOL, METERED ORAL EVERY 6 HOURS
Refills: 0 | Status: DISCONTINUED | OUTPATIENT
Start: 2021-01-06 | End: 2021-01-10

## 2021-01-06 RX ORDER — ROSUVASTATIN CALCIUM 5 MG/1
1 TABLET ORAL
Qty: 0 | Refills: 0 | DISCHARGE

## 2021-01-06 RX ORDER — VANCOMYCIN HCL 1 G
1000 VIAL (EA) INTRAVENOUS EVERY 12 HOURS
Refills: 0 | Status: DISCONTINUED | OUTPATIENT
Start: 2021-01-06 | End: 2021-01-07

## 2021-01-06 RX ORDER — ASPIRIN/CALCIUM CARB/MAGNESIUM 324 MG
81 TABLET ORAL DAILY
Refills: 0 | Status: DISCONTINUED | OUTPATIENT
Start: 2021-01-06 | End: 2021-01-10

## 2021-01-06 RX ORDER — INSULIN LISPRO 100/ML
VIAL (ML) SUBCUTANEOUS
Refills: 0 | Status: DISCONTINUED | OUTPATIENT
Start: 2021-01-06 | End: 2021-01-06

## 2021-01-06 RX ORDER — CLONAZEPAM 1 MG
0.5 TABLET ORAL ONCE
Refills: 0 | Status: DISCONTINUED | OUTPATIENT
Start: 2021-01-06 | End: 2021-01-06

## 2021-01-06 RX ORDER — POLYETHYLENE GLYCOL 3350 17 G/17G
17 POWDER, FOR SOLUTION ORAL DAILY
Refills: 0 | Status: DISCONTINUED | OUTPATIENT
Start: 2021-01-06 | End: 2021-01-08

## 2021-01-06 RX ORDER — INSULIN LISPRO 100/ML
VIAL (ML) SUBCUTANEOUS AT BEDTIME
Refills: 0 | Status: DISCONTINUED | OUTPATIENT
Start: 2021-01-06 | End: 2021-01-10

## 2021-01-06 RX ORDER — FUROSEMIDE 40 MG
40 TABLET ORAL DAILY
Refills: 0 | Status: DISCONTINUED | OUTPATIENT
Start: 2021-01-06 | End: 2021-01-10

## 2021-01-06 RX ORDER — ENOXAPARIN SODIUM 100 MG/ML
40 INJECTION SUBCUTANEOUS
Refills: 0 | Status: DISCONTINUED | OUTPATIENT
Start: 2021-01-06 | End: 2021-01-10

## 2021-01-06 RX ORDER — DEXTROSE 50 % IN WATER 50 %
15 SYRINGE (ML) INTRAVENOUS ONCE
Refills: 0 | Status: DISCONTINUED | OUTPATIENT
Start: 2021-01-06 | End: 2021-01-10

## 2021-01-06 RX ORDER — OXYCODONE HYDROCHLORIDE 5 MG/1
80 TABLET ORAL EVERY 12 HOURS
Refills: 0 | Status: DISCONTINUED | OUTPATIENT
Start: 2021-01-06 | End: 2021-01-10

## 2021-01-06 RX ORDER — FLUTICASONE PROPIONATE AND SALMETEROL 50; 250 UG/1; UG/1
2 POWDER ORAL; RESPIRATORY (INHALATION)
Qty: 0 | Refills: 0 | DISCHARGE

## 2021-01-06 RX ORDER — OXYCODONE HYDROCHLORIDE 5 MG/1
1 TABLET ORAL
Qty: 0 | Refills: 0 | DISCHARGE

## 2021-01-06 RX ORDER — INSULIN LISPRO 100/ML
VIAL (ML) SUBCUTANEOUS AT BEDTIME
Refills: 0 | Status: DISCONTINUED | OUTPATIENT
Start: 2021-01-06 | End: 2021-01-06

## 2021-01-06 RX ORDER — CHOLECALCIFEROL (VITAMIN D3) 125 MCG
2000 CAPSULE ORAL DAILY
Refills: 0 | Status: DISCONTINUED | OUTPATIENT
Start: 2021-01-06 | End: 2021-01-10

## 2021-01-06 RX ADMIN — PIPERACILLIN AND TAZOBACTAM 200 GRAM(S): 4; .5 INJECTION, POWDER, LYOPHILIZED, FOR SOLUTION INTRAVENOUS at 16:28

## 2021-01-06 RX ADMIN — Medication 0.5 MILLIGRAM(S): at 20:09

## 2021-01-06 RX ADMIN — OXYCODONE HYDROCHLORIDE 80 MILLIGRAM(S): 5 TABLET ORAL at 21:43

## 2021-01-06 RX ADMIN — INSULIN GLARGINE 64 UNIT(S): 100 INJECTION, SOLUTION SUBCUTANEOUS at 22:00

## 2021-01-06 RX ADMIN — PIPERACILLIN AND TAZOBACTAM 25 GRAM(S): 4; .5 INJECTION, POWDER, LYOPHILIZED, FOR SOLUTION INTRAVENOUS at 23:08

## 2021-01-06 RX ADMIN — SODIUM CHLORIDE 1000 MILLILITER(S): 9 INJECTION INTRAMUSCULAR; INTRAVENOUS; SUBCUTANEOUS at 18:07

## 2021-01-06 RX ADMIN — Medication 250 MILLIGRAM(S): at 21:22

## 2021-01-06 RX ADMIN — ATORVASTATIN CALCIUM 80 MILLIGRAM(S): 80 TABLET, FILM COATED ORAL at 21:43

## 2021-01-06 NOTE — H&P ADULT - PROBLEM SELECTOR PLAN 1
Pt with recurrent right foot infections recently admitted in November for cellulitis/osteomylitis, now presenting with warmth, erythema, and tenderness of right foot concerning for cellulitis r/o osteomyelitis  -s/p Zosyn and Vancomycin in the ED  -will continue on Zosyn (1/6- ) and Vancomycin (1/6- )  -f/u blood cultures  -X-ray of right foot, concerning for osteomyelitis, may need MRI of right foot to confirm   -Will likely need podiatry consult and ID consult to help manage recurrent foot infection

## 2021-01-06 NOTE — H&P ADULT - NSHPREVIEWOFSYSTEMS_GEN_ALL_CORE
REVIEW OF SYSTEMS:    CONSTITUTIONAL: No weakness, fevers or chills  EYES/ENT: No visual changes;  No vertigo or throat pain   NECK: No pain or stiffness  RESPIRATORY: No cough, wheezing, hemoptysis; No shortness of breath  CARDIOVASCULAR: No chest pain or palpitations  GASTROINTESTINAL: No abdominal or epigastric pain. No nausea, vomiting, or hematemesis; No diarrhea or constipation. No melena or hematochezia.  GENITOURINARY: No dysuria, frequency or hematuria  NEUROLOGICAL: No numbness or weakness  SKIN: No itching, rashes REVIEW OF SYSTEMS:    CONSTITUTIONAL: No weakness, fevers or chills  EYES/ENT: No visual changes;  No vertigo or throat pain   NECK: + pain or stiffness (but chronic)  RESPIRATORY: No cough, wheezing, hemoptysis; No shortness of breath  CARDIOVASCULAR: No chest pain or palpitations  GASTROINTESTINAL: No abdominal or epigastric pain. No nausea, vomiting, or hematemesis; No diarrhea or constipation. No melena or hematochezia.  GENITOURINARY: No dysuria, frequency or hematuria  NEUROLOGICAL: No numbness or weakness  EXTREMITIES: + right foot erythema, edema, warmth  SKIN: No itching, rashes

## 2021-01-06 NOTE — H&P ADULT - ATTENDING COMMENTS
Pt seen and examined. Agree with above with additional findings:    # r/o R foot OM  # pulm HTN, COPD on home O2  # morbid obesity  # uncontrolled DM II    - s/p 16 days of IV abx for diabetic foot infection in 11/2020 (bone bx negative for osteo at that time), presenting with worsening erythema/edema, x-ray concerning for OM  - obtain MRI foot to rule in/out OM  - continue vancomycin, zosyn for now  - consult podiatry for further management  - pt known to Dr. Lockhart (ID): will consult  - FSS+ISS    Wendy Snyder MD  Division of Hospital Medicine  Cell: [phone]  Office: [phone]

## 2021-01-06 NOTE — H&P ADULT - PROBLEM SELECTOR PLAN 2
Pt has a chronic history of cervical and lumbar spinal pain.  - On 80mg oxycodone BID at home and hydromorphone-acetaminophen 7.25mg/325mg TID PRN at home  - will continue home medications while inpatient for pain management

## 2021-01-06 NOTE — ED PROVIDER NOTE - CLINICAL SUMMARY MEDICAL DECISION MAKING FREE TEXT BOX
65 Yr. female morbidly obese DM HTN Asthma CKD CHF Pulmonary artery HTN Spinal stenosis, mobilizes with a mobility aid h/o Right lower leg cellulitis in past now with recurrence of infection. Investigate and Observe.

## 2021-01-06 NOTE — ED PROVIDER NOTE - ATTENDING CONTRIBUTION TO CARE
Attending Statement (YONNY Soria MD):    HPI: 64y/o F, h/o morbid obesity, IDDM, HTN, CKD, and multiple other medical comorbidities including recurrent / refractory right foot diabetic ulcer and cellulitis; with possible osteomyelitis (per podiatrist) who sent her to ED for admission; failing outpatient antibiotics (augmentin x >1 week); denies fever/chills; unable to bear weight on R foot b/c of pain    Review of Systems:  -General: no fever or chills  -ENT: no congestion, no difficulty swallowing  -Pulmonary: no cough, no shortness of breath  -Cardiac: no chest pain, no palpitations  -Gastrointestinal: no abdominal pain, no nausea, no vomiting, and no diarrhea.  -Genitourinary: no blood or pain with urination  -Musculoskeletal: no back or neck pain  -Skin: R foot redness and ulcer  -Endocrine: +h/o diabetes  -Neurologic: No focal weakness or numbness    All else negative unless otherwise specified elsewhere in this note.    PSH/PMH as noted above    On Physical Exam:  General: morbidly obese female who is in no acute distress and is speaking clearly in full sentences and without difficulty; cooperative with exam  HEENT: anicteric sclera, airway patent  Neck: no neck tenderness, no nuchal rigidity  Cardiac: normal s1, s2; RRR; no MGR  Lungs: CTABL  Abdomen: soft nontender/nondistended  : no bladder tenderness or distension  Extremities:   -RLE: diffuse R foot erythema and edema, warm to touch; 2 cm round ulcer on plantar surface of foot with no bleeding/exudates/purulence, no palpable fluctuance or crepitance.  Neuro: no gross neurologic deficits: no faical asymmetry, moving all extremities equally    MDM: R foot cellulitis with possible underlying osteomyelitis; will obtain labs: cbc (to evaluate for leukocytosis or anemia), CMP (to evaluate for electrolyte abnormalities or renal/liver dysfunction), blood cultures/esr/crp; xrays of foot; and start empiric antibiotics vancomycin/zosyn; to be admitted for further evaluation/management.

## 2021-01-06 NOTE — ED ADULT NURSE NOTE - OBJECTIVE STATEMENT
64 yo female presents to ED c/o RLE cellulitis. Patient has hx of morbid obesity, DM, ckd, htn, cervical stenosis. Patient had recent admission for RLE cellulitis. Had recent bone scan that states "I have osteomyelitis". Patient reports over past 2 weeks has had increased R foot swelling and redness with worsening pain. Patient states she has regular debridement of wound on RLE. Patient denies SOB, CP, nvd, fever/chills, sick contacts, falls/loc. Patient A&Ox3, breathing spontaneously, airway patent, bl clear lungs, abdomen nontender, +pulses, cap refill <2 seconds. Patient resting in bed, side rails up, plan of care explained. MD at bedside, cardiac monitor in place.

## 2021-01-06 NOTE — ED PROVIDER NOTE - OBJECTIVE STATEMENT
65 Yr. old morbidly obese female with recent admission for right lower leg cellulitis. She underwent a bone scan suggesting Osteomyelitis and a subsequent Biopsy (Site: Right ankle) was normal. However she required a protracted course of antibiotics and had some improvement. She is being looked after by a podiatrist who does regular debridement of her right foot however over past 2 weeks she has started noticing swelling, redness of her right lower leg again. Now she presents for a re-evaluation of possible recurrence of Right lower leg cellulitis.

## 2021-01-06 NOTE — H&P ADULT - PROBLEM SELECTOR PLAN 5
TTE from 7/20 with moderate diastolic disfunction and moderate pulmonary HTN, on 3L of O2  - c/w home dose of 40mg lasix qd  - c/w with O2 as needed

## 2021-01-06 NOTE — H&P ADULT - PROBLEM SELECTOR PLAN 6
Pt with recently diagnosed asthma, on Advair HFA qd and Albuterol PRN at home  - continue with Advair and albuterol while inpatient

## 2021-01-06 NOTE — ED ADULT NURSE REASSESSMENT NOTE - NS ED NURSE REASSESS COMMENT FT1
RN attempted multiple IV placements without success. Ultrasound team contacted to place U/S guided IV.

## 2021-01-06 NOTE — H&P ADULT - PROBLEM SELECTOR PLAN 3
Last Hgba1c - 10.4 in 11/20  - At home on Levemir 80 units BID, and Novolog 15 units before meals  - while inpatient will start on 64 Lantus BID, and 12 units Novolog before meals  - f/u A1c

## 2021-01-06 NOTE — ED PROCEDURE NOTE - PROCEDURE ADDITIONAL DETAILS
20 Gauge IV catheter placed Left arm AC  	Dynamic gray scale imaging of the target vessel was obtained using a high frequency linear transducer.   	Both the target vein and surrounding arterial structures were visualized and identified.   	The patency of the targeted vein was confirmed with compression and lack of internal echoes.   	There was direct visualization of needle entry into the vein followed by successful catheter placement

## 2021-01-06 NOTE — ED PROVIDER NOTE - FAMILY HISTORY
Family history of pancreatic cancer     Family history of bladder cancer     Grandparent  Still living? Unknown  Family history of lung cancer, Age at diagnosis: Age Unknown

## 2021-01-06 NOTE — ED ADULT NURSE NOTE - NSIMPLEMENTINTERV_GEN_ALL_ED
Implemented All Fall with Harm Risk Interventions:  [place] to call system. Call bell, personal items and telephone within reach. Instruct patient to call for assistance. Room bathroom lighting operational. Non-slip footwear when patient is off stretcher. Physically safe environment: no spills, clutter or unnecessary equipment. Stretcher in lowest position, wheels locked, appropriate side rails in place. Provide visual cue, wrist band, yellow gown, etc. Monitor gait and stability. Monitor for mental status changes and reorient to person, place, and time. Review medications for side effects contributing to fall risk. Reinforce activity limits and safety measures with patient and family. Provide visual clues: red socks.

## 2021-01-06 NOTE — H&P ADULT - PROBLEM SELECTOR PLAN 4
At home on rosuvastatin 20mg qd  - will c/w atorvastatin 40mg while inpatient     #CAD   c/w Aspirin

## 2021-01-06 NOTE — H&P ADULT - NSHPSOCIALHISTORY_GEN_ALL_CORE
Pt lives at home with  and 2 kids. She is retired. She denies ever smoking tobacco, drinking alcohol, or any other recreational drugs.

## 2021-01-06 NOTE — H&P ADULT - NSHPPHYSICALEXAM_GEN_ALL_CORE
PHYSICAL EXAM:  ICU Vital Signs Last 24 Hrs  T(C): 36.8 (06 Jan 2021 14:12), Max: 36.8 (06 Jan 2021 14:12)  T(F): 98.2 (06 Jan 2021 14:12), Max: 98.2 (06 Jan 2021 14:12)  HR: 81 (06 Jan 2021 14:12) (81 - 84)  BP: 129/51 (06 Jan 2021 14:12) (121/55 - 129/51)  RR: 16 (06 Jan 2021 14:12) (16 - 16)  SpO2: 99% (06 Jan 2021 14:12) (99% - 99%)  GENERAL: NAD, well-groomed, well-developed  HEAD:  Atraumatic, Normocephalic  EYES: EOMI, PERRLA, conjunctiva and sclera clear  ENMT: No tonsillar erythema, exudates, or enlargement; Moist mucous membranes, Good dentition, No lesions  NECK: Supple, No JVD, Normal thyroid  CHEST/LUNG: Clear to percussion bilaterally; No rales, rhonchi, wheezing, or rubs  HEART: Regular rate and rhythm; No murmurs, rubs, or gallops  ABDOMEN: Soft, Nontender, Nondistended; Bowel sounds present  VASCULAR:  2+ Peripheral Pulses, No clubbing, cyanosis, or edema  LYMPH: No lymphadenopathy noted  SKIN: No rashes or lesions  NERVOUS SYSTEM:  Alert & Oriented X3, Good concentration; Motor Strength 5/5 B/L upper and lower extremities; DTRs 2+ intact and symmetric PHYSICAL EXAM:  ICU Vital Signs Last 24 Hrs  T(C): 36.8 (06 Jan 2021 14:12), Max: 36.8 (06 Jan 2021 14:12)  T(F): 98.2 (06 Jan 2021 14:12), Max: 98.2 (06 Jan 2021 14:12)  HR: 81 (06 Jan 2021 14:12) (81 - 84)  BP: 129/51 (06 Jan 2021 14:12) (121/55 - 129/51)  RR: 16 (06 Jan 2021 14:12) (16 - 16)  SpO2: 99% (06 Jan 2021 14:12) (99% - 99%)  GENERAL: NAD, morbidly obese lying in bed  HEAD:  Atraumatic, Normocephalic  EYES: EOMI, conjunctiva and sclera clear  ENMT: No tonsillar erythema, exudates, or enlargement; Moist mucous membranes, Good dentition, No lesions  NECK: Supple, No JVD  CHEST/LUNG: Clear to auscultation bilaterally; No rales, rhonchi, wheezing, or rubs  HEART: Regular rate and rhythm; No murmurs, rubs, or gallops  ABDOMEN: Obese, Soft, Nontender, Nondistended; Bowel sounds present, + umbilical hernia  VASCULAR: 2+ Peripheral Pulses, + erythema, warmth of right foot, open ulcer at the base of right foot  LYMPH: No lymphadenopathy noted  SKIN: No rashes or lesions  NERVOUS SYSTEM:  Alert & Oriented X3, Good concentration

## 2021-01-06 NOTE — H&P ADULT - ASSESSMENT
Patient is a 65 year old woman with PMH morbid obesity, asthma, CHF, pulmonary HTN, home on 3L NC, T2DM, CKD, HTN, diabetic neuropathy, Charcot foot, chronic venous statis and chronic right foot wound, she presents today with worsening erythema, edema, warmth of the right foot concerning for cellulitis and underlying osteomyelitis.

## 2021-01-06 NOTE — ED PROVIDER NOTE - NS ED ROS FT
CONSTITUTIONAL: No fevers or chills  EYES/ENT: No visual or hearing changes;  No throat pain   NECK: No pain   RESPIRATORY (+): c/o SOB due to underlying pulmonary HTN/Asthma/CHF. No cough, wheezing, hemoptysis  CARDIOVASCULAR: No chest pain or palpitations  GASTROINTESTINAL: No abdominal pain. No nausea, vomiting, or hematemesis; No diarrhea or constipation. No melena or hematochezia.  GENITOURINARY: No dysuria, frequency   NEUROLOGICAL: No numbness or weakness  SKIN (+): c/o eczema on lower shins and generalized dryness of skin.      All other review of systems is negative unless indicated above.

## 2021-01-06 NOTE — H&P ADULT - HISTORY OF PRESENT ILLNESS
Patient is a 65 year old woman with PMH morbid obesity, asthma, CHF, pulmonary HTN, home on 3L NC, T2DM, CKD, HTN, diabetic neuropathy, Charcot foot, chronic venous statis and chronic right foot wound, Patient is a 65 year old woman with PMH morbid obesity, asthma, CHF, pulmonary HTN, home on 3L NC, T2DM, CKD, HTN, diabetic neuropathy, Charcot foot, chronic venous statis and chronic right foot wound, she presents today with worsening erythema, edema, warmth of the right foot. She was recently admitted in November for cellulitis of the same right foot, found to have osteomyelitis and discharged with antibiotics. Pt states her right leg was improving after discharge, but she noticed in December it was progressively getting more erythematous, warm, edematous, and painful. She was told to come in to the hospital now by her podiatry team as she would most likely need IV abx and possible debridement. She states she hasn't had any recent fevers or chills. She has been ambulating with a rolling walker but generally staying off her right foot. She otherwise denies any headaches, cough, chest pain, SOB, n/v, abdominal pain, dysuria, diarrhea. Admitted to medicine upon evaluation for further workup.    In the ED: Afebrile and VS stable. X-ray of right foot concerning for osteomyelitis, Given 1L NS, Zosyn. Vancomycin ordered.

## 2021-01-07 LAB
ANION GAP SERPL CALC-SCNC: 12 MMOL/L — SIGNIFICANT CHANGE UP (ref 5–17)
BUN SERPL-MCNC: 28 MG/DL — HIGH (ref 7–23)
CALCIUM SERPL-MCNC: 8.9 MG/DL — SIGNIFICANT CHANGE UP (ref 8.4–10.5)
CHLORIDE SERPL-SCNC: 101 MMOL/L — SIGNIFICANT CHANGE UP (ref 96–108)
CO2 SERPL-SCNC: 26 MMOL/L — SIGNIFICANT CHANGE UP (ref 22–31)
CREAT SERPL-MCNC: 1.68 MG/DL — HIGH (ref 0.5–1.3)
CRP SERPL-MCNC: 1.25 MG/DL — HIGH (ref 0–0.4)
ERYTHROCYTE [SEDIMENTATION RATE] IN BLOOD: 70 MM/HR — HIGH (ref 0–20)
GLUCOSE BLDC GLUCOMTR-MCNC: 141 MG/DL — HIGH (ref 70–99)
GLUCOSE BLDC GLUCOMTR-MCNC: 170 MG/DL — HIGH (ref 70–99)
GLUCOSE BLDC GLUCOMTR-MCNC: 183 MG/DL — HIGH (ref 70–99)
GLUCOSE BLDC GLUCOMTR-MCNC: 250 MG/DL — HIGH (ref 70–99)
GLUCOSE SERPL-MCNC: 166 MG/DL — HIGH (ref 70–99)
HCT VFR BLD CALC: 39.9 % — SIGNIFICANT CHANGE UP (ref 34.5–45)
HGB BLD-MCNC: 12 G/DL — SIGNIFICANT CHANGE UP (ref 11.5–15.5)
MAGNESIUM SERPL-MCNC: 2.1 MG/DL — SIGNIFICANT CHANGE UP (ref 1.6–2.6)
MCHC RBC-ENTMCNC: 28.8 PG — SIGNIFICANT CHANGE UP (ref 27–34)
MCHC RBC-ENTMCNC: 30.1 GM/DL — LOW (ref 32–36)
MCV RBC AUTO: 95.9 FL — SIGNIFICANT CHANGE UP (ref 80–100)
NRBC # BLD: 0 /100 WBCS — SIGNIFICANT CHANGE UP (ref 0–0)
PHOSPHATE SERPL-MCNC: 3.5 MG/DL — SIGNIFICANT CHANGE UP (ref 2.5–4.5)
PLATELET # BLD AUTO: 215 K/UL — SIGNIFICANT CHANGE UP (ref 150–400)
POTASSIUM SERPL-MCNC: 4.3 MMOL/L — SIGNIFICANT CHANGE UP (ref 3.5–5.3)
POTASSIUM SERPL-SCNC: 4.3 MMOL/L — SIGNIFICANT CHANGE UP (ref 3.5–5.3)
RBC # BLD: 4.16 M/UL — SIGNIFICANT CHANGE UP (ref 3.8–5.2)
RBC # FLD: 13.7 % — SIGNIFICANT CHANGE UP (ref 10.3–14.5)
SARS-COV-2 IGG SERPL QL IA: NEGATIVE — SIGNIFICANT CHANGE UP
SARS-COV-2 IGM SERPL IA-ACNC: 0.07 INDEX — SIGNIFICANT CHANGE UP
SODIUM SERPL-SCNC: 139 MMOL/L — SIGNIFICANT CHANGE UP (ref 135–145)
WBC # BLD: 10.21 K/UL — SIGNIFICANT CHANGE UP (ref 3.8–10.5)
WBC # FLD AUTO: 10.21 K/UL — SIGNIFICANT CHANGE UP (ref 3.8–10.5)

## 2021-01-07 PROCEDURE — 99233 SBSQ HOSP IP/OBS HIGH 50: CPT | Mod: GC

## 2021-01-07 PROCEDURE — 99233 SBSQ HOSP IP/OBS HIGH 50: CPT

## 2021-01-07 RX ORDER — PIPERACILLIN AND TAZOBACTAM 4; .5 G/20ML; G/20ML
3.38 INJECTION, POWDER, LYOPHILIZED, FOR SOLUTION INTRAVENOUS EVERY 8 HOURS
Refills: 0 | Status: DISCONTINUED | OUTPATIENT
Start: 2021-01-07 | End: 2021-01-10

## 2021-01-07 RX ORDER — OXYCODONE HYDROCHLORIDE 5 MG/1
5 TABLET ORAL EVERY 6 HOURS
Refills: 0 | Status: DISCONTINUED | OUTPATIENT
Start: 2021-01-07 | End: 2021-01-10

## 2021-01-07 RX ORDER — CLONAZEPAM 1 MG
0.5 TABLET ORAL ONCE
Refills: 0 | Status: DISCONTINUED | OUTPATIENT
Start: 2021-01-07 | End: 2021-01-07

## 2021-01-07 RX ORDER — PETROLATUM,WHITE
1 JELLY (GRAM) TOPICAL DAILY
Refills: 0 | Status: DISCONTINUED | OUTPATIENT
Start: 2021-01-07 | End: 2021-01-10

## 2021-01-07 RX ORDER — INSULIN LISPRO 100/ML
15 VIAL (ML) SUBCUTANEOUS
Refills: 0 | Status: DISCONTINUED | OUTPATIENT
Start: 2021-01-07 | End: 2021-01-10

## 2021-01-07 RX ORDER — INSULIN GLARGINE 100 [IU]/ML
80 INJECTION, SOLUTION SUBCUTANEOUS
Refills: 0 | Status: DISCONTINUED | OUTPATIENT
Start: 2021-01-07 | End: 2021-01-10

## 2021-01-07 RX ADMIN — ENOXAPARIN SODIUM 40 MILLIGRAM(S): 100 INJECTION SUBCUTANEOUS at 05:26

## 2021-01-07 RX ADMIN — Medication 0.5 MILLIGRAM(S): at 21:04

## 2021-01-07 RX ADMIN — OXYCODONE HYDROCHLORIDE 5 MILLIGRAM(S): 5 TABLET ORAL at 21:58

## 2021-01-07 RX ADMIN — Medication 250 MILLIGRAM(S): at 08:59

## 2021-01-07 RX ADMIN — ATORVASTATIN CALCIUM 80 MILLIGRAM(S): 80 TABLET, FILM COATED ORAL at 21:04

## 2021-01-07 RX ADMIN — ALBUTEROL 2 PUFF(S): 90 AEROSOL, METERED ORAL at 12:10

## 2021-01-07 RX ADMIN — Medication 2000 UNIT(S): at 12:14

## 2021-01-07 RX ADMIN — OXYCODONE HYDROCHLORIDE 5 MILLIGRAM(S): 5 TABLET ORAL at 15:43

## 2021-01-07 RX ADMIN — Medication 4: at 12:54

## 2021-01-07 RX ADMIN — Medication 40 MILLIGRAM(S): at 05:26

## 2021-01-07 RX ADMIN — PIPERACILLIN AND TAZOBACTAM 25 GRAM(S): 4; .5 INJECTION, POWDER, LYOPHILIZED, FOR SOLUTION INTRAVENOUS at 05:26

## 2021-01-07 RX ADMIN — Medication 12 UNIT(S): at 08:59

## 2021-01-07 RX ADMIN — Medication 81 MILLIGRAM(S): at 12:10

## 2021-01-07 RX ADMIN — Medication 15 UNIT(S): at 17:59

## 2021-01-07 RX ADMIN — INSULIN GLARGINE 80 UNIT(S): 100 INJECTION, SOLUTION SUBCUTANEOUS at 21:53

## 2021-01-07 RX ADMIN — OXYCODONE HYDROCHLORIDE 80 MILLIGRAM(S): 5 TABLET ORAL at 20:03

## 2021-01-07 RX ADMIN — Medication 1 APPLICATION(S): at 12:10

## 2021-01-07 RX ADMIN — POLYETHYLENE GLYCOL 3350 17 GRAM(S): 17 POWDER, FOR SOLUTION ORAL at 12:11

## 2021-01-07 RX ADMIN — PIPERACILLIN AND TAZOBACTAM 25 GRAM(S): 4; .5 INJECTION, POWDER, LYOPHILIZED, FOR SOLUTION INTRAVENOUS at 21:04

## 2021-01-07 RX ADMIN — INSULIN GLARGINE 64 UNIT(S): 100 INJECTION, SOLUTION SUBCUTANEOUS at 08:58

## 2021-01-07 RX ADMIN — ENOXAPARIN SODIUM 40 MILLIGRAM(S): 100 INJECTION SUBCUTANEOUS at 17:59

## 2021-01-07 RX ADMIN — Medication 2: at 08:59

## 2021-01-07 RX ADMIN — OXYCODONE HYDROCHLORIDE 80 MILLIGRAM(S): 5 TABLET ORAL at 08:22

## 2021-01-07 RX ADMIN — Medication 15 UNIT(S): at 12:55

## 2021-01-07 NOTE — PROGRESS NOTE ADULT - PROBLEM SELECTOR PLAN 3
Last Hgba1c - 10.4 in 11/20  - At home on Levemir 80 units BID, and Novolog 15 units before meals  - while inpatient will start on 64 Lantus BID, and 12 units Novolog before meals  - f/u A1c Last Hgba1c - 10.4 in 11/20  - At home on Levemir 80 units BID, and Novolog 15 units before meals  - while inpatient will start on 80 Lantus BID, and 12 units Novolog before meals

## 2021-01-07 NOTE — PROGRESS NOTE ADULT - PROBLEM SELECTOR PLAN 1
Pt with recurrent right foot infections recently admitted in November for cellulitis/osteomylitis, now presenting with warmth, erythema, and tenderness of right foot concerning for cellulitis r/o osteomyelitis  -s/p Zosyn and Vancomycin in the ED  -will continue on Zosyn (1/6- ) and Vancomycin (1/6- )  -f/u blood cultures  -X-ray of right foot, concerning for osteomyelitis, may need MRI of right foot to confirm   -Will likely need podiatry consult and ID consult to help manage recurrent foot infection Pt with recurrent right foot infections recently admitted in November for cellulitis / osteomyelitis, now presenting with warmth, erythema, and tenderness of right foot concerning for cellulitis r/o osteomyelitis  -s/p Zosyn and Vancomycin in the ED  -will continue on Zosyn (1/6- ) and Vancomycin (1/6- )  -f/u blood cultures  -X-ray of right foot, concerning for osteomyelitis  -MRI of right foot w/wo contrast to further evaluate ordered  -Podiatry consulted  -ID consulted

## 2021-01-07 NOTE — CONSULT NOTE ADULT - ASSESSMENT
65F w/ R foot plantar midfoot wound  - Pt seen and evaluated  - She is wel known to podiatric service with previousl admissions in July & November & during her last admission a bone biopsy was obtained which was negative & Infectious Disease attending Dr. Lockhart recommend discharging off antibiotics & to manage as sof tissue infection  - Excisional debridement of R foot wound to the level of but not beyond subq with no probe to bone  - Wound culture of R foot obtained  - Recommend ID consultation  - From podiatric perspective patient would benefit from long term IV ABx however will defer to Infectious Disease for that decision  - Will follow  - d/w attending
65 year old woman with PMH morbid obesity, asthma, CHF, pulmonary HTN, home on 3L NC, T2DM, CKD, HTN, diabetic neuropathy, Charcot foot, chronic venous statis and chronic right foot wound, she presents today with worsening erythema, edema, warmth of the right foot. In the ED afebrile, normotensive and not tachycardic. WBC on presentation of 10.06 with 70.3% PMN. ESR (1/7) 70 and CRP (1/6) 1.38.     U/A with 0 WBC.   COVID19 PCR (1/6) negative.   XR of right foot (1/6) with Shallow ulceration at the plantar midfoot but No underlying cortical erosion at the base of the fifth metatarsal    Planned for MRI of the right foot to exclude underlying OM  RLE foot with erythema of sole but this is also present on left foot - unclear if secondary to venous stasis  Can continue Zosyn for now pending MRI and wound cultures  Prior wound cultures with MSSA and Finegoldia  Course to be decided base on imaging and course    Overall, Foot Wound, Elevated ESR, Elevated CRP    --Restart Zosyn 3.375 mg IV Q8H for now  --Monitor off of Vancomycin  --Follow up on MRI of foot  --Follow up on ulcer debridement cultures  --Continue to follow CBC with diff  --Continue to follow temperature curve  --Follow up on preliminary blood cultures    I will continue to follow. Please feel free to contact me with any further questions.    Reinaldo Freed M.D.  [facility] Division of Infectious Disease  8AM-5PM: Pager Number [phone]  After Hours (or if no response): Please contact the Infectious Diseases Office at [phone]     The above assessment and plan were discussed with medicine attending

## 2021-01-07 NOTE — PHYSICAL THERAPY INITIAL EVALUATION ADULT - PASSIVE RANGE OF MOTION EXAMINATION, REHAB EVAL
Left UE Passive ROM was WNL (within normal limits)/Right UE Passive ROM was WNL (within normal limits)/Left LE Passive ROM was WNL (within normal limits)/Right LE Passive ROM was WNL (within normal limits)

## 2021-01-07 NOTE — PROGRESS NOTE ADULT - PROBLEM SELECTOR PLAN 6
Pt with recently diagnosed asthma, on Advair HFA qd and Albuterol PRN at home  - continue with Advair and albuterol while inpatient Pt with recently diagnosed asthma, on Advair HFA qd and Albuterol PRN at home  - continue with symbicort and albuterol while inpatient

## 2021-01-07 NOTE — PROGRESS NOTE ADULT - SUBJECTIVE AND OBJECTIVE BOX
Lexus Arredondo MD   Internal Medicine PGY-1  Pager: NS: [phone] [facility]: [phone]    INCOMPLETE NOTE Lexus Arredondo MD   Internal Medicine PGY-1  Pager: NS: [phone] [facility]: [phone]    Patient is a 65y old  Female who presents with a chief complaint of Right foot wound (2021 06:50)    SUBJECTIVE / OVERNIGHT EVENTS:    MEDICATIONS  (STANDING):  acetaminophen   Tablet .. 650 milliGRAM(s) Oral once  aspirin enteric coated 81 milliGRAM(s) Oral daily  atorvastatin 80 milliGRAM(s) Oral at bedtime  budesonide  80 MICROgram(s)/formoterol 4.5 MICROgram(s) Inhaler 2 Puff(s) Inhalation daily  cholecalciferol 2000 Unit(s) Oral daily  dextrose 40% Gel 15 Gram(s) Oral once  dextrose 5%. 1000 milliLiter(s) (50 mL/Hr) IV Continuous <Continuous>  dextrose 5%. 1000 milliLiter(s) (100 mL/Hr) IV Continuous <Continuous>  dextrose 50% Injectable 25 Gram(s) IV Push once  dextrose 50% Injectable 12.5 Gram(s) IV Push once  dextrose 50% Injectable 25 Gram(s) IV Push once  enoxaparin Injectable 40 milliGRAM(s) SubCutaneous two times a day  furosemide    Tablet 40 milliGRAM(s) Oral daily  glucagon  Injectable 1 milliGRAM(s) IntraMuscular once  insulin glargine Injectable (LANTUS) 64 Unit(s) SubCutaneous two times a day  insulin lispro (ADMELOG) corrective regimen sliding scale   SubCutaneous three times a day before meals  insulin lispro (ADMELOG) corrective regimen sliding scale   SubCutaneous at bedtime  insulin lispro Injectable (ADMELOG) 12 Unit(s) SubCutaneous three times a day before meals  oxyCODONE  ER Tablet 80 milliGRAM(s) Oral every 12 hours  piperacillin/tazobactam IVPB.. 3.375 Gram(s) IV Intermittent every 8 hours  polyethylene glycol 3350 17 Gram(s) Oral daily  vancomycin  IVPB 1000 milliGRAM(s) IV Intermittent every 12 hours    MEDICATIONS  (PRN):  ALBUTerol    90 MICROgram(s) HFA Inhaler 2 Puff(s) Inhalation every 6 hours PRN Shortness of Breath  ondansetron    Tablet 4 milliGRAM(s) Oral daily PRN Nausea and/or Vomiting      T(C): 36.8 (21 @ 05:04), Max: 36.8 (21 @ 14:12)  HR: 77 (21 @ 05:04) (75 - 84)  BP: 114/61 (21 @ 05:04) (114/61 - 129/51)  RR: 18 (21 @ 05:04) (16 - 20)  SpO2: 97% (21 @ 05:04) (95% - 100%)    CAPILLARY BLOOD GLUCOSE  POCT Blood Glucose.: 183 mg/dL (2021 08:13)  POCT Blood Glucose.: 158 mg/dL (2021 21:43)  POCT Blood Glucose.: 88 mg/dL (2021 13:35)    I&O's Summary    2021 07:01  -  2021 07:00  --------------------------------------------------------  IN: 237 mL / OUT: 750 mL / NET: -513 mL    REVIEW OF SYSTEMS    General: No fevers/chills  Skin/Breast: No rash  Ophthalmologic: No vision changes  ENMT:No dysphagia or odynophagia  Respiratory and Thorax: No SOB, wheezing, cough  Cardiovascular: No chest pain or palpitations  Gastrointestinal: No n/v/d, No melena, No Hematochezia  Genitourinary:  No dysuria, No change in urinary frequency  Musculoskeletal: No joint or muscle pains.  Neurological: No focal deficits, No headache    PHYSICAL EXAM:  GENERAL: NAD, well-developed  HEAD:  Atraumatic, Normocephalic  EYES: EOMI, PERRLA, conjunctiva and sclera clear  NECK: Supple, No JVD  CHEST/LUNG: Clear to auscultation bilaterally; No wheeze  HEART: Regular rate and rhythm; No murmurs, rubs, or gallops  ABDOMEN: Soft, Nontender, Nondistended; Bowel sounds present  EXTREMITIES:  2+ Peripheral Pulses, No clubbing, cyanosis, or edema  PSYCH: AAOx3  NEUROLOGY: non-focal  SKIN: No rashes or lesions    LABS:                        12.0   10.21 )-----------( 215      ( 2021 06:55 )             39.9         139  |  101  |  28<H>  ----------------------------<  166<H>  4.3   |  26  |  1.68<H>    Ca    8.9      2021 06:52  Phos  3.5       Mg     2.1         TPro  6.7  /  Alb  4.0  /  TBili  0.5  /  DBili  x   /  AST  11  /  ALT  14  /  AlkPhos  96      PT/INR - ( 2021 16:41 )   PT: 12.2 sec;   INR: 1.02 ratio      PTT - ( 2021 16:41 )  PTT:26.9 sec    Urinalysis Basic - ( 2021 20:48 )    Color: Light Yellow / Appearance: Clear / S.017 / pH: x  Gluc: x / Ketone: Negative  / Bili: Negative / Urobili: Negative   Blood: x / Protein: Trace / Nitrite: Negative   Leuk Esterase: Negative / RBC: 1 /hpf / WBC 0 /HPF   Sq Epi: x / Non Sq Epi: 0 /hpf / Bacteria: Negative    RADIOLOGY & ADDITIONAL TESTS:    Imaging Personally Reviewed:  < from: Xray Foot AP + Lateral, Right (21 @ 14:05) >  EXAM:  FOOT 2VIEWS RT                          PROCEDURE DATE:  2021      INTERPRETATION:  CLINICAL INDICATION: Right heel/midfoot ulcer with cellulitis, question osteomyelitis    TECHNIQUE:  2 views of the right foot.    COMPARISON: Right foot x-ray from 2020    FINDINGS:    Diffuse soft tissue swelling.  Shallow ulceration at the plantar midfoot without evidence of subcutaneous tracking gas. There is underlying new cortical erosion at the base of the fifth metatarsal.  There are no acute displaced fractures or dislocations.  Redemonstration of mid foot Charcot arthropathy and heterotopic ossification in the region of the Achilles tendon.    IMPRESSION:  Shallow ulceration at the plantar midfoot.  No underlying cortical erosion at the base of the fifth metatarsal, concerning for osteomyelitis. If clinically warranted sectional imaging should be obtained.    ABDULLAHI KOWALSKI MD; Resident Radiology  This document has been electronically signed.  HENRY BROWN MD; Attending Radiologist  This document has been electronically signed. 2021  3:58PM    < end of copied text >    Consultant(s) Notes Reviewed:  Podiatry and ID    Care Discussed with Consultants/Other Providers: Podiatry and ID   Lexus Arredondo MD   Internal Medicine PGY-1  Pager: NS: [phone] [facility]: [phone]    Patient is a 65y old  Female who presents with a chief complaint of Right foot wound (2021 06:50)    SUBJECTIVE / OVERNIGHT EVENTS:  No acute overnight events. Pt feels okay this morning. Tolerating diet. Awaiting formal ID and Podiatry evaluations. She denies any fevers, chills, n/v, abdominal pain, chest pain SOB, dysuria. Right foot still erythematous, and warm. No other complaints.     MEDICATIONS  (STANDING):  acetaminophen   Tablet .. 650 milliGRAM(s) Oral once  aspirin enteric coated 81 milliGRAM(s) Oral daily  atorvastatin 80 milliGRAM(s) Oral at bedtime  budesonide  80 MICROgram(s)/formoterol 4.5 MICROgram(s) Inhaler 2 Puff(s) Inhalation daily  cholecalciferol 2000 Unit(s) Oral daily  dextrose 40% Gel 15 Gram(s) Oral once  dextrose 5%. 1000 milliLiter(s) (50 mL/Hr) IV Continuous <Continuous>  dextrose 5%. 1000 milliLiter(s) (100 mL/Hr) IV Continuous <Continuous>  dextrose 50% Injectable 25 Gram(s) IV Push once  dextrose 50% Injectable 12.5 Gram(s) IV Push once  dextrose 50% Injectable 25 Gram(s) IV Push once  enoxaparin Injectable 40 milliGRAM(s) SubCutaneous two times a day  furosemide    Tablet 40 milliGRAM(s) Oral daily  glucagon  Injectable 1 milliGRAM(s) IntraMuscular once  insulin glargine Injectable (LANTUS) 64 Unit(s) SubCutaneous two times a day  insulin lispro (ADMELOG) corrective regimen sliding scale   SubCutaneous three times a day before meals  insulin lispro (ADMELOG) corrective regimen sliding scale   SubCutaneous at bedtime  insulin lispro Injectable (ADMELOG) 12 Unit(s) SubCutaneous three times a day before meals  oxyCODONE  ER Tablet 80 milliGRAM(s) Oral every 12 hours  piperacillin/tazobactam IVPB.. 3.375 Gram(s) IV Intermittent every 8 hours  polyethylene glycol 3350 17 Gram(s) Oral daily  vancomycin  IVPB 1000 milliGRAM(s) IV Intermittent every 12 hours    MEDICATIONS  (PRN):  ALBUTerol    90 MICROgram(s) HFA Inhaler 2 Puff(s) Inhalation every 6 hours PRN Shortness of Breath  ondansetron    Tablet 4 milliGRAM(s) Oral daily PRN Nausea and/or Vomiting    T(C): 36.8 (21 @ 05:04), Max: 36.8 (21 @ 14:12)  HR: 77 (21 @ 05:04) (75 - 84)  BP: 114/61 (21 @ 05:04) (114/61 - 129/51)  RR: 18 (21 @ 05:04) (16 - 20)  SpO2: 97% (21 @ 05:04) (95% - 100%)    CAPILLARY BLOOD GLUCOSE  POCT Blood Glucose.: 183 mg/dL (2021 08:13)  POCT Blood Glucose.: 158 mg/dL (2021 21:43)  POCT Blood Glucose.: 88 mg/dL (2021 13:35)    I&O's Summary    2021 07:01  -  2021 07:00  --------------------------------------------------------  IN: 237 mL / OUT: 750 mL / NET: -513 mL    REVIEW OF SYSTEMS  General: No fevers/chills  Skin/Breast: right foot erythema   Ophthalmologic: No vision changes  ENMT: No dysphagia or odynophagia  Respiratory and Thorax: No SOB, wheezing, cough  Cardiovascular: No chest pain or palpitations  Gastrointestinal: No n/v/d  Genitourinary:  No dysuria, No change in urinary frequency  Musculoskeletal: No joint or muscle pains.  Neurological: No focal deficits, No headache    PHYSICAL EXAM:  GENERAL: NAD, well-developed  HEAD:  Atraumatic, Normocephalic  EYES: EOMI, conjunctiva and sclera clear  NECK: Supple, No JVD  CHEST/LUNG: Clear to auscultation bilaterally; No wheeze or crackles, distant lung sounds  HEART: Regular rate and rhythm; No murmurs, rubs, or gallops, distant heart sounds  ABDOMEN: Soft, Nontender, Nondistended; Bowel sounds present  EXTREMITIES:  2+ Peripheral Pulses, No clubbing, cyanosis, or edema  PSYCH: AAOx3  NEUROLOGY: non-focal  SKIN: right foot erythema, swelling, and warmth, ulcer at the bottom of the right foot    LABS:                        12.0   10.21 )-----------( 215      ( 2021 06:55 )             39.9         139  |  101  |  28<H>  ----------------------------<  166<H>  4.3   |  26  |  1.68<H>    Ca    8.9      2021 06:52  Phos  3.5       Mg     2.1         TPro  6.7  /  Alb  4.0  /  TBili  0.5  /  DBili  x   /  AST  11  /  ALT  14  /  AlkPhos  96      PT/INR - ( 2021 16:41 )   PT: 12.2 sec;   INR: 1.02 ratio      PTT - ( 2021 16:41 )  PTT:26.9 sec    Urinalysis Basic - ( 2021 20:48 )    Color: Light Yellow / Appearance: Clear / S.017 / pH: x  Gluc: x / Ketone: Negative  / Bili: Negative / Urobili: Negative   Blood: x / Protein: Trace / Nitrite: Negative   Leuk Esterase: Negative / RBC: 1 /hpf / WBC 0 /HPF   Sq Epi: x / Non Sq Epi: 0 /hpf / Bacteria: Negative    RADIOLOGY & ADDITIONAL TESTS:    Imaging Personally Reviewed:  < from: Xray Foot AP + Lateral, Right (21 @ 14:05) >  EXAM:  FOOT 2VIEWS RT                          PROCEDURE DATE:  2021      INTERPRETATION:  CLINICAL INDICATION: Right heel/midfoot ulcer with cellulitis, question osteomyelitis    TECHNIQUE:  2 views of the right foot.    COMPARISON: Right foot x-ray from 2020    FINDINGS:    Diffuse soft tissue swelling.  Shallow ulceration at the plantar midfoot without evidence of subcutaneous tracking gas. There is underlying new cortical erosion at the base of the fifth metatarsal.  There are no acute displaced fractures or dislocations.  Redemonstration of mid foot Charcot arthropathy and heterotopic ossification in the region of the Achilles tendon.    IMPRESSION:  Shallow ulceration at the plantar midfoot.  No underlying cortical erosion at the base of the fifth metatarsal, concerning for osteomyelitis. If clinically warranted sectional imaging should be obtained.    ABDULLAHI KOWALSKI MD; Resident Radiology  This document has been electronically signed.  HENRY BROWN MD; Attending Radiologist  This document has been electronically signed. 2021  3:58PM    < end of copied text >    Consultant(s) Notes Reviewed:  Podiatry and ID    Care Discussed with Consultants/Other Providers: Podiatry and ID

## 2021-01-07 NOTE — CONSULT NOTE ADULT - SUBJECTIVE AND OBJECTIVE BOX
Patient is a 65y old  Female who presents with a chief complaint of Right foot wound (2021 11:48)      HPI:    65 year old woman with PMH morbid obesity, asthma, CHF, pulmonary HTN, home on 3L NC, T2DM, CKD, HTN, diabetic neuropathy, Charcot foot, chronic venous statis and chronic right foot wound, she presents today with worsening erythema, edema, warmth of the right foot. She was recently admitted in November for cellulitis of the same right foot, found to have osteomyelitis and discharged with antibiotics. Pt states her right leg was improving after discharge, but she noticed in December it was progressively getting more erythematous, warm, edematous, and painful. She was told to come in to the hospital now by her podiatry team as she would most likely need IV abx and possible debridement. She states she hasn't had any recent fevers or chills. She has been ambulating with a rolling walker but generally staying off her right foot. She otherwise denies any headaches, cough, chest pain, SOB, n/v, abdominal pain, dysuria, diarrhea. Admitted to medicine upon evaluation for further workup.    In the ED afebrile, normotensive and not tachycardic. WBC on presentation of 10.06 with 70.3% PMN. ESR () 70 and CRP () 1.38. U/A with 0 WBC. COVID19 PCR () negative. XR of right foot () with Shallow ulceration at the plantar midfoot but No underlying cortical erosion at the base of the fifth metatarsal    prior hospital charts reviewed [  ]  primary team notes reviewed [  ]  other consultant notes reviewed [  ]    PAST MEDICAL & SURGICAL HISTORY:  CKD (chronic kidney disease)    Insomnia    Edema of lower extremity  on lasix    Vitamin D deficiency    Morbid Obesity    Cataract    Dyslipidemia    Deep Vein Thrombosis (DVT)  Left leg, , treated and resolved    Spinal Stenosis, Lumbar    Cervical Stenosis of Spine    Endometrial Hyperplasia    HTN (Hypertension)    Diabetes Mellitus Type II    S/P cholecystectomy    S/P appendectomy    S/P total abdominal hysterectomy and bilateral salpingo-oophorectomy    H/O colonoscopy      H/O laser iridotomy  left eye, 2016    Endometrial biopsy 09    Tonsillectomy as a child    Cervical Spinal Stenosis surgery x2 (2002, 2002)    D&amp;C   hysteroscopy, endometrial hyperplasia, 2009    D&amp;C x2 1980&#x27;s    Cholecystectomy/appendectomy @ age 25    C Section 1994    Cataract extracted with lens implant   Right        Allergies  adhesives (Rash)  Bactrim (Flushing)  latex (Rash)  wool- rash, itch (Other)    ANTIMICROBIALS (past 90 days)  MEDICATIONS  (STANDING):  piperacillin/tazobactam IVPB.   200 mL/Hr IV Intermittent (21 @ 16:28)    piperacillin/tazobactam IVPB..   25 mL/Hr IV Intermittent (21 @ 05:26)   25 mL/Hr IV Intermittent (21 @ 23:08)    vancomycin  IVPB   250 mL/Hr IV Intermittent (21 @ 08:59)   250 mL/Hr IV Intermittent (21 @ 21:22)      ANTIMICROBIALS:      OTHER MEDS: MEDICATIONS  (STANDING):  acetaminophen   Tablet .. 650 once  ALBUTerol    90 MICROgram(s) HFA Inhaler 2 every 6 hours PRN  aspirin enteric coated 81 daily  atorvastatin 80 at bedtime  budesonide  80 MICROgram(s)/formoterol 4.5 MICROgram(s) Inhaler 2 daily  dextrose 40% Gel 15 once  dextrose 50% Injectable 25 once  dextrose 50% Injectable 12.5 once  dextrose 50% Injectable 25 once  enoxaparin Injectable 40 two times a day  furosemide    Tablet 40 daily  glucagon  Injectable 1 once  insulin glargine Injectable (LANTUS) 80 two times a day  insulin lispro (ADMELOG) corrective regimen sliding scale  three times a day before meals  insulin lispro (ADMELOG) corrective regimen sliding scale  at bedtime  insulin lispro Injectable (ADMELOG) 15 three times a day before meals  ondansetron    Tablet 4 daily PRN  oxyCODONE    IR 5 every 6 hours PRN  oxyCODONE  ER Tablet 80 every 12 hours  polyethylene glycol 3350 17 daily    SOCIAL HISTORY:   hx smoking  non-smoker    FAMILY HISTORY:  Family history of lung cancer (Grandparent)    Family history of bladder cancer    Family history of pancreatic cancer      REVIEW OF SYSTEMS  [  ] ROS unobtainable because:    [  ] All other systems negative except as noted below:	    Constitutional:  [ ] fever [ ] chills  [ ] weight loss  [ ] weakness  Skin:  [ ] rash [ ] phlebitis	  Eyes: [ ] icterus [ ] pain  [ ] discharge	  ENMT: [ ] sore throat  [ ] thrush [ ] ulcers [ ] exudates  Respiratory: [ ] dyspnea [ ] hemoptysis [ ] cough [ ] sputum	  Cardiovascular:  [ ] chest pain [ ] palpitations [ ] edema	  Gastrointestinal:  [ ] nausea [ ] vomiting [ ] diarrhea [ ] constipation [ ] pain	  Genitourinary:  [ ] dysuria [ ] frequency [ ] hematuria [ ] discharge [ ] flank pain  [ ] incontinence  Musculoskeletal:  [ ] myalgias [ ] arthralgias [ ] arthritis  [ ] back pain  Neurological:  [ ] headache [ ] seizures  [ ] confusion/altered mental status  Psychiatric:  [ ] anxiety [ ] depression	  Hematology/Lymphatics:  [ ] lymphadenopathy  Endocrine:  [ ] adrenal [ ] thyroid  Allergic/Immunologic:	 [ ] transplant [ ] seasonal    Vital Signs Last 24 Hrs  T(F): 97.1 (21 @ 12:32), Max: 98.3 (21 @ 05:04)  Vital Signs Last 24 Hrs  HR: 70 (21 @ 12:32) (70 - 81)  BP: 108/54 (21 @ 12:32) (108/54 - 129/51)  RR: 18 (21 @ 12:32)  SpO2: 96% (21 @ 12:32) (95% - 100%)  Wt(kg): --    PHYSICAL EXAMINATION:  General: Alert and Awake, NAD  HEENT: PERRL, EOMI, No subconjunctival hemorrhages, Oropharynx Clear, MMM  Neck: Supple, No ISH  Cardiac: RRR, No M/R/G  Resp: CTAB, No Wh/Rh/Ra  Abdomen: NBS, NT/ND, No HSM, No rigidity or guarding  MSK: No LE edema. No stigmata of IE. No evidence of phlebitis. No evidence of synovitis.  : No de la torre  Skin: No rashes or lesions. Skin is warm and dry to the touch.   Neuro: Alert and Awake. CN 2-12 Grossly intact. Moves all four extremities spontaneously.  Psych: Calm, Pleasant, Cooperative                          12.0   10.21 )-----------( 215      ( 2021 06:55 )             39.9         139  |  101  |  28<H>  ----------------------------<  166<H>  4.3   |  26  |  1.68<H>    Ca    8.9      2021 06:52  Phos  3.5       Mg     2.1         TPro  6.7  /  Alb  4.0  /  TBili  0.5  /  DBili  x   /  AST  11  /  ALT  14  /  AlkPhos  96      Urinalysis Basic - ( 2021 20:48 )    Color: Light Yellow / Appearance: Clear / S.017 / pH: x  Gluc: x / Ketone: Negative  / Bili: Negative / Urobili: Negative   Blood: x / Protein: Trace / Nitrite: Negative   Leuk Esterase: Negative / RBC: 1 /hpf / WBC 0 /HPF   Sq Epi: x / Non Sq Epi: 0 /hpf / Bacteria: Negative    MICROBIOLOGY:    Culture - Abscess with Gram Stain (11.10.20 @ 03:27)   - RIF- Rifampin: S <=1 Should not be used as monotherapy   - Tetra/Doxy: S <=1   - Trimethoprim/Sulfamethoxazole: S <=0.5/9.5   - Vancomycin: S 1   - Ampicillin/Sulbactam: S <=8/4   - Cefazolin: S <=4   - Clindamycin: S <=0.25   - Erythromycin: S <=0.25   - Gentamicin: S <=1 Should not be used as monotherapy   - Oxacillin: S <=0.25   - Penicillin: R 2   Specimen Source: .Abscess right foot wound   Culture Results:   Culture yields >4 types of aerobic and/or anaerobic bacteria   Call client services within 7 days if further workup is clinically   indicated. Culture includes   Moderate Staphylococcus aureus   Organism Identification: Staphylococcus aureus   Organism: Staphylococcus aureus   Method Type: DARREL       RADIOLOGY:    <The imaging below has been reviewed and visualized by me independently. Findings as detailed in report below>    EXAM:  FOOT 2VIEWS RT                        PROCEDURE DATE:  2021    Shallow ulceration at the plantar midfoot.  No underlying cortical erosion at the base of the fifth metatarsal, concerning for osteomyelitis.   If clinically warranted sectional imaging should be obtained.     Patient is a 65y old  Female who presents with a chief complaint of Right foot wound (2021 11:48)      HPI:    65 year old woman with PMH morbid obesity, asthma, CHF, pulmonary HTN, home on 3L NC, T2DM, CKD, HTN, diabetic neuropathy, Charcot foot, chronic venous statis and chronic right foot wound, she presents today with worsening erythema, edema, warmth of the right foot. She was recently admitted in November for cellulitis of the same right foot, found to have osteomyelitis and discharged with antibiotics. Pt states her right leg was improving after discharge, but she noticed in December it was progressively getting more erythematous, warm, edematous, and painful. She was told to come in to the hospital now by her podiatry team as she would most likely need IV abx and possible debridement. She states she hasn't had any recent fevers or chills. She has been ambulating with a rolling walker but generally staying off her right foot. She otherwise denies any headaches, cough, chest pain, SOB, n/v, abdominal pain, dysuria, diarrhea. Admitted to medicine upon evaluation for further workup.    In the ED afebrile, normotensive and not tachycardic. WBC on presentation of 10.06 with 70.3% PMN. ESR () 70 and CRP () 1.38. U/A with 0 WBC. COVID19 PCR () negative. XR of right foot () with Shallow ulceration at the plantar midfoot but No underlying cortical erosion at the base of the fifth metatarsal    prior hospital charts reviewed [  ]  primary team notes reviewed [ x ]  other consultant notes reviewed [ x ]    PAST MEDICAL & SURGICAL HISTORY:  CKD (chronic kidney disease)    Insomnia    Edema of lower extremity  on lasix    Vitamin D deficiency    Morbid Obesity    Cataract    Dyslipidemia    Deep Vein Thrombosis (DVT)  Left leg, , treated and resolved    Spinal Stenosis, Lumbar    Cervical Stenosis of Spine    Endometrial Hyperplasia    HTN (Hypertension)    Diabetes Mellitus Type II    S/P cholecystectomy    S/P appendectomy    S/P total abdominal hysterectomy and bilateral salpingo-oophorectomy    H/O colonoscopy      H/O laser iridotomy  left eye, 2016    Endometrial biopsy 09    Tonsillectomy as a child    Cervical Spinal Stenosis surgery x2 (2002, 2002)    D&amp;C   hysteroscopy, endometrial hyperplasia, 2009    D&amp;C x2 1980&#x27;s    Cholecystectomy/appendectomy @ age 25    C Section     Cataract extracted with lens implant   Right        Allergies  adhesives (Rash)  Bactrim (Flushing)  latex (Rash)  wool- rash, itch (Other)    ANTIMICROBIALS (past 90 days)  MEDICATIONS  (STANDING):  piperacillin/tazobactam IVPB.   200 mL/Hr IV Intermittent (21 @ 16:28)    piperacillin/tazobactam IVPB..   25 mL/Hr IV Intermittent (21 @ 05:26)   25 mL/Hr IV Intermittent (21 @ 23:08)    vancomycin  IVPB   250 mL/Hr IV Intermittent (21 @ 08:59)   250 mL/Hr IV Intermittent (21 @ 21:22)      ANTIMICROBIALS:      OTHER MEDS: MEDICATIONS  (STANDING):  acetaminophen   Tablet .. 650 once  ALBUTerol    90 MICROgram(s) HFA Inhaler 2 every 6 hours PRN  aspirin enteric coated 81 daily  atorvastatin 80 at bedtime  budesonide  80 MICROgram(s)/formoterol 4.5 MICROgram(s) Inhaler 2 daily  dextrose 40% Gel 15 once  dextrose 50% Injectable 25 once  dextrose 50% Injectable 12.5 once  dextrose 50% Injectable 25 once  enoxaparin Injectable 40 two times a day  furosemide    Tablet 40 daily  glucagon  Injectable 1 once  insulin glargine Injectable (LANTUS) 80 two times a day  insulin lispro (ADMELOG) corrective regimen sliding scale  three times a day before meals  insulin lispro (ADMELOG) corrective regimen sliding scale  at bedtime  insulin lispro Injectable (ADMELOG) 15 three times a day before meals  ondansetron    Tablet 4 daily PRN  oxyCODONE    IR 5 every 6 hours PRN  oxyCODONE  ER Tablet 80 every 12 hours  polyethylene glycol 3350 17 daily    SOCIAL HISTORY: no smoking, etoh use or drug use.     FAMILY HISTORY:  Family history of lung cancer (Grandparent)    Family history of bladder cancer    Family history of pancreatic cancer      REVIEW OF SYSTEMS  [  ] ROS unobtainable because:    [ x ] All other systems negative except as noted below:	    Constitutional:  [ ] fever [ ] chills  [ ] weight loss  [ ] weakness  Skin:  [ ] rash [ ] phlebitis	  Eyes: [ ] icterus [ ] pain  [ ] discharge	  ENMT: [ ] sore throat  [ ] thrush [ ] ulcers [ ] exudates  Respiratory: [ ] dyspnea [ ] hemoptysis [ ] cough [ ] sputum	  Cardiovascular:  [ ] chest pain [ ] palpitations [ ] edema	  Gastrointestinal:  [ ] nausea [ ] vomiting [ ] diarrhea [ ] constipation [ ] pain	  Genitourinary:  [ ] dysuria [ ] frequency [ ] hematuria [ ] discharge [ ] flank pain  [ ] incontinence  Musculoskeletal:  [ ] myalgias [ ] arthralgias [ ] arthritis  [ ] back pain +RLE foot erythema   Neurological:  [ ] headache [ ] seizures  [ ] confusion/altered mental status  Psychiatric:  [ ] anxiety [ ] depression	  Hematology/Lymphatics:  [ ] lymphadenopathy  Endocrine:  [ ] adrenal [ ] thyroid  Allergic/Immunologic:	 [ ] transplant [ ] seasonal    Vital Signs Last 24 Hrs  T(F): 97.1 (21 @ 12:32), Max: 98.3 (21 @ 05:04)  Vital Signs Last 24 Hrs  HR: 70 (21 @ 12:32) (70 - 81)  BP: 108/54 (21 @ 12:32) (108/54 - 129/51)  RR: 18 (21 @ 12:32)  SpO2: 96% (21 @ 12:32) (95% - 100%)  Wt(kg): --    PHYSICAL EXAMINATION:  General: Alert and Awake, NAD  HEENT: PERRL, EOMI, No subconjunctival hemorrhages, Oropharynx Clear, MMM  Neck: Supple, No ISH  Cardiac: RRR, No M/R/G  Resp: CTAB, No Wh/Rh/Ra  Abdomen: Obese, NBS, NT/ND, No HSM, No rigidity or guarding  MSK: RLE Foot with ulcer at sole with no drainage, erythema of the sole of the foot. erythema of the left sole of the foot as well. 2+ bilateral LE edema. No stigmata of IE. No evidence of phlebitis. No evidence of synovitis.  : No de la torre  Skin: No rashes or lesions. Skin is warm and dry to the touch.   Neuro: Alert and Awake. CN 2-12 Grossly intact. Moves all four extremities spontaneously.  Psych: Calm, Pleasant, Cooperative                          12.0   10.21 )-----------( 215      ( 2021 06:55 )             39.9     01-07    139  |  101  |  28<H>  ----------------------------<  166<H>  4.3   |  26  |  1.68<H>    Ca    8.9      2021 06:52  Phos  3.5       Mg     2.1         TPro  6.7  /  Alb  4.0  /  TBili  0.5  /  DBili  x   /  AST  11  /  ALT  14  /  AlkPhos  96      Urinalysis Basic - ( 2021 20:48 )    Color: Light Yellow / Appearance: Clear / S.017 / pH: x  Gluc: x / Ketone: Negative  / Bili: Negative / Urobili: Negative   Blood: x / Protein: Trace / Nitrite: Negative   Leuk Esterase: Negative / RBC: 1 /hpf / WBC 0 /HPF   Sq Epi: x / Non Sq Epi: 0 /hpf / Bacteria: Negative    MICROBIOLOGY:    Culture - Abscess with Gram Stain (11.10.20 @ 03:27)   - RIF- Rifampin: S <=1 Should not be used as monotherapy   - Tetra/Doxy: S <=1   - Trimethoprim/Sulfamethoxazole: S <=0.5/9.5   - Vancomycin: S 1   - Ampicillin/Sulbactam: S <=8/4   - Cefazolin: S <=4   - Clindamycin: S <=0.25   - Erythromycin: S <=0.25   - Gentamicin: S <=1 Should not be used as monotherapy   - Oxacillin: S <=0.25   - Penicillin: R 2   Specimen Source: .Abscess right foot wound   Culture Results:   Culture yields >4 types of aerobic and/or anaerobic bacteria   Call client services within 7 days if further workup is clinically   indicated. Culture includes   Moderate Staphylococcus aureus   Organism Identification: Staphylococcus aureus   Organism: Staphylococcus aureus   Method Type: DARREL       RADIOLOGY:    <The imaging below has been reviewed and visualized by me independently. Findings as detailed in report below>    EXAM:  FOOT 2VIEWS RT                        PROCEDURE DATE:  2021    Shallow ulceration at the plantar midfoot.  No underlying cortical erosion at the base of the fifth metatarsal, concerning for osteomyelitis.   If clinically warranted sectional imaging should be obtained.

## 2021-01-07 NOTE — PROGRESS NOTE ADULT - PROBLEM SELECTOR PLAN 2
Pt has a chronic history of cervical and lumbar spinal pain.  - On 80mg oxycodone BID at home and hydromorphone-acetaminophen 7.25mg/325mg TID PRN at home  - will continue home medications while inpatient for pain management Pt has a chronic history of cervical and lumbar spinal pain.  - On 80mg oxycodone BID at home and hydromorphone-acetaminophen 7.25mg/325mg TID PRN at home (will need to bring from home if patient needs)  - will continue home medications while inpatient for pain management

## 2021-01-07 NOTE — PHYSICAL THERAPY INITIAL EVALUATION ADULT - ADDITIONAL COMMENTS
As per pt, pt lives in a private house w/ sons and ramp access. PTA pt was required assist w/ some mobility w/ RW and ADLs. Pt ambulated short household distances w/ RW and used motorized scooter for community ambulation. Pt owns RW shower chair commode and motorized scooter.

## 2021-01-07 NOTE — CONSULT NOTE ADULT - SUBJECTIVE AND OBJECTIVE BOX
Patient is a 65y old  Female who presents with a chief complaint of Right foot wound (07 Jan 2021 06:50)      HPI:  Patient is a 65 year old woman with PMH morbid obesity, asthma, CHF, pulmonary HTN, home on 3L NC, T2DM, CKD, HTN, diabetic neuropathy, Charcot foot, chronic venous statis and chronic right foot wound, she presents today with worsening erythema, edema, warmth of the right foot. She was recently admitted in November for cellulitis of the same right foot, found to have osteomyelitis and discharged with antibiotics. Pt states her right leg was improving after discharge, but she noticed in December it was progressively getting more erythematous, warm, edematous, and painful. She was told to come in to the hospital now by her podiatry team as she would most likely need IV abx and possible debridement. She states she hasn't had any recent fevers or chills. She has been ambulating with a rolling walker but generally staying off her right foot. She otherwise denies any headaches, cough, chest pain, SOB, n/v, abdominal pain, dysuria, diarrhea. Admitted to medicine upon evaluation for further workup.    In the ED: Afebrile and VS stable. X-ray of right foot concerning for osteomyelitis, Given 1L NS, Zosyn. Vancomycin ordered.    (06 Jan 2021 17:16)      PAST MEDICAL & SURGICAL HISTORY:  CKD (chronic kidney disease)    Insomnia    Edema of lower extremity  on lasix    Vitamin D deficiency    Morbid Obesity    Cataract    Dyslipidemia    Deep Vein Thrombosis (DVT)  Left leg, 2004, treated and resolved    Spinal Stenosis, Lumbar    Cervical Stenosis of Spine    Endometrial Hyperplasia    HTN (Hypertension)    Diabetes Mellitus Type II    S/P cholecystectomy    S/P appendectomy    S/P total abdominal hysterectomy and bilateral salpingo-oophorectomy    H/O colonoscopy  1998    H/O laser iridotomy  left eye, 2016    Endometrial biopsy 12/02/09    Tonsillectomy as a child    Cervical Spinal Stenosis surgery x2 (01/2002, 7/2002)    D&amp;C 2008  hysteroscopy, endometrial hyperplasia, 2009    D&amp;C x2 1980&#x27;s    Cholecystectomy/appendectomy @ age 25    C Section 1994    Cataract extracted with lens implant 1998  Right        MEDICATIONS  (STANDING):  acetaminophen   Tablet .. 650 milliGRAM(s) Oral once  AQUAPHOR (petrolatum Ointment) 1 Application(s) Topical daily  aspirin enteric coated 81 milliGRAM(s) Oral daily  atorvastatin 80 milliGRAM(s) Oral at bedtime  budesonide  80 MICROgram(s)/formoterol 4.5 MICROgram(s) Inhaler 2 Puff(s) Inhalation daily  cholecalciferol 2000 Unit(s) Oral daily  dextrose 40% Gel 15 Gram(s) Oral once  dextrose 5%. 1000 milliLiter(s) (50 mL/Hr) IV Continuous <Continuous>  dextrose 5%. 1000 milliLiter(s) (100 mL/Hr) IV Continuous <Continuous>  dextrose 50% Injectable 25 Gram(s) IV Push once  dextrose 50% Injectable 12.5 Gram(s) IV Push once  dextrose 50% Injectable 25 Gram(s) IV Push once  enoxaparin Injectable 40 milliGRAM(s) SubCutaneous two times a day  furosemide    Tablet 40 milliGRAM(s) Oral daily  glucagon  Injectable 1 milliGRAM(s) IntraMuscular once  insulin glargine Injectable (LANTUS) 80 Unit(s) SubCutaneous two times a day  insulin lispro (ADMELOG) corrective regimen sliding scale   SubCutaneous three times a day before meals  insulin lispro (ADMELOG) corrective regimen sliding scale   SubCutaneous at bedtime  insulin lispro Injectable (ADMELOG) 12 Unit(s) SubCutaneous three times a day before meals  oxyCODONE  ER Tablet 80 milliGRAM(s) Oral every 12 hours  polyethylene glycol 3350 17 Gram(s) Oral daily    MEDICATIONS  (PRN):  ALBUTerol    90 MICROgram(s) HFA Inhaler 2 Puff(s) Inhalation every 6 hours PRN Shortness of Breath  ondansetron    Tablet 4 milliGRAM(s) Oral daily PRN Nausea and/or Vomiting  oxyCODONE    IR 5 milliGRAM(s) Oral every 6 hours PRN breakthrough pain      Allergies    adhesives (Rash)  Bactrim (Flushing)  latex (Rash)  wool- rash, itch (Other)    Intolerances        VITALS:    Vital Signs Last 24 Hrs  T(C): 36.8 (07 Jan 2021 05:04), Max: 36.8 (06 Jan 2021 14:12)  T(F): 98.3 (07 Jan 2021 05:04), Max: 98.3 (07 Jan 2021 05:04)  HR: 77 (07 Jan 2021 05:04) (75 - 84)  BP: 114/61 (07 Jan 2021 05:04) (114/61 - 129/51)  BP(mean): 67 (06 Jan 2021 18:08) (67 - 67)  RR: 18 (07 Jan 2021 05:04) (16 - 20)  SpO2: 97% (07 Jan 2021 05:04) (95% - 100%)    LABS:                          12.0   10.21 )-----------( 215      ( 07 Jan 2021 06:55 )             39.9       01-07    139  |  101  |  28<H>  ----------------------------<  166<H>  4.3   |  26  |  1.68<H>    Ca    8.9      07 Jan 2021 06:52  Phos  3.5     01-07  Mg     2.1     01-07    TPro  6.7  /  Alb  4.0  /  TBili  0.5  /  DBili  x   /  AST  11  /  ALT  14  /  AlkPhos  96  01-06      CAPILLARY BLOOD GLUCOSE      POCT Blood Glucose.: 183 mg/dL (07 Jan 2021 08:13)  POCT Blood Glucose.: 158 mg/dL (06 Jan 2021 21:43)  POCT Blood Glucose.: 88 mg/dL (06 Jan 2021 13:35)      PT/INR - ( 06 Jan 2021 16:41 )   PT: 12.2 sec;   INR: 1.02 ratio         PTT - ( 06 Jan 2021 16:41 )  PTT:26.9 sec    LOWER EXTREMITY PHYSICAL EXAM:  Vascular: DP/PT 1/4 B/L, CFT <3 seconds B/L, Temperature gradient warm to cool B/L  Neuro: Epicritic sensation diminished to the level of midfoot B/L  Musculoskeletal/Ortho: charcot foot deformity R, non-ambi  Skin: Right plantar midfoot wound to subq, no purulence, no fluctuance, no malodor, hyperkeratotic borders, erythema of R foot limited to plantar and dorsal forefoot, blancheable erythema ; L foot showing signs of erythema noted to forefoot, L forefoot warm to touch, no open wounds to L foot    RADIOLOGY & ADDITIONAL STUDIES:

## 2021-01-07 NOTE — PROGRESS NOTE ADULT - PROBLEM SELECTOR PLAN 4
At home on rosuvastatin 20mg qd  - will c/w atorvastatin 40mg while inpatient     #CAD   c/w Aspirin At home on rosuvastatin 20mg qd  - will c/w atorvastatin 80mg while inpatient     #CAD   c/w Aspirin 81mg

## 2021-01-08 ENCOUNTER — TRANSCRIPTION ENCOUNTER (OUTPATIENT)
Age: 66
End: 2021-01-08

## 2021-01-08 LAB
ANION GAP SERPL CALC-SCNC: 12 MMOL/L — SIGNIFICANT CHANGE UP (ref 5–17)
BUN SERPL-MCNC: 29 MG/DL — HIGH (ref 7–23)
CALCIUM SERPL-MCNC: 9.3 MG/DL — SIGNIFICANT CHANGE UP (ref 8.4–10.5)
CHLORIDE SERPL-SCNC: 99 MMOL/L — SIGNIFICANT CHANGE UP (ref 96–108)
CO2 SERPL-SCNC: 26 MMOL/L — SIGNIFICANT CHANGE UP (ref 22–31)
CREAT SERPL-MCNC: 1.77 MG/DL — HIGH (ref 0.5–1.3)
GLUCOSE BLDC GLUCOMTR-MCNC: 132 MG/DL — HIGH (ref 70–99)
GLUCOSE BLDC GLUCOMTR-MCNC: 132 MG/DL — HIGH (ref 70–99)
GLUCOSE BLDC GLUCOMTR-MCNC: 137 MG/DL — HIGH (ref 70–99)
GLUCOSE BLDC GLUCOMTR-MCNC: 161 MG/DL — HIGH (ref 70–99)
GLUCOSE SERPL-MCNC: 127 MG/DL — HIGH (ref 70–99)
HCT VFR BLD CALC: 39.2 % — SIGNIFICANT CHANGE UP (ref 34.5–45)
HGB BLD-MCNC: 12 G/DL — SIGNIFICANT CHANGE UP (ref 11.5–15.5)
MAGNESIUM SERPL-MCNC: 2 MG/DL — SIGNIFICANT CHANGE UP (ref 1.6–2.6)
MCHC RBC-ENTMCNC: 29.6 PG — SIGNIFICANT CHANGE UP (ref 27–34)
MCHC RBC-ENTMCNC: 30.6 GM/DL — LOW (ref 32–36)
MCV RBC AUTO: 96.8 FL — SIGNIFICANT CHANGE UP (ref 80–100)
NRBC # BLD: 0 /100 WBCS — SIGNIFICANT CHANGE UP (ref 0–0)
PHOSPHATE SERPL-MCNC: 3.1 MG/DL — SIGNIFICANT CHANGE UP (ref 2.5–4.5)
PLATELET # BLD AUTO: 170 K/UL — SIGNIFICANT CHANGE UP (ref 150–400)
POTASSIUM SERPL-MCNC: 4 MMOL/L — SIGNIFICANT CHANGE UP (ref 3.5–5.3)
POTASSIUM SERPL-SCNC: 4 MMOL/L — SIGNIFICANT CHANGE UP (ref 3.5–5.3)
RBC # BLD: 4.05 M/UL — SIGNIFICANT CHANGE UP (ref 3.8–5.2)
RBC # FLD: 13.6 % — SIGNIFICANT CHANGE UP (ref 10.3–14.5)
SODIUM SERPL-SCNC: 137 MMOL/L — SIGNIFICANT CHANGE UP (ref 135–145)
WBC # BLD: 8.54 K/UL — SIGNIFICANT CHANGE UP (ref 3.8–10.5)
WBC # FLD AUTO: 8.54 K/UL — SIGNIFICANT CHANGE UP (ref 3.8–10.5)

## 2021-01-08 PROCEDURE — 99232 SBSQ HOSP IP/OBS MODERATE 35: CPT

## 2021-01-08 PROCEDURE — 99233 SBSQ HOSP IP/OBS HIGH 50: CPT | Mod: GC

## 2021-01-08 RX ORDER — POLYETHYLENE GLYCOL 3350 17 G/17G
17 POWDER, FOR SOLUTION ORAL
Refills: 0 | Status: DISCONTINUED | OUTPATIENT
Start: 2021-01-08 | End: 2021-01-10

## 2021-01-08 RX ORDER — SENNA PLUS 8.6 MG/1
2 TABLET ORAL AT BEDTIME
Refills: 0 | Status: DISCONTINUED | OUTPATIENT
Start: 2021-01-08 | End: 2021-01-10

## 2021-01-08 RX ORDER — COLLAGENASE CLOSTRIDIUM HIST. 250 UNIT/G
1 OINTMENT (GRAM) TOPICAL DAILY
Refills: 0 | Status: DISCONTINUED | OUTPATIENT
Start: 2021-01-08 | End: 2021-01-10

## 2021-01-08 RX ORDER — CLONAZEPAM 1 MG
0.5 TABLET ORAL AT BEDTIME
Refills: 0 | Status: DISCONTINUED | OUTPATIENT
Start: 2021-01-08 | End: 2021-01-10

## 2021-01-08 RX ADMIN — SENNA PLUS 2 TABLET(S): 8.6 TABLET ORAL at 22:06

## 2021-01-08 RX ADMIN — Medication 2: at 12:55

## 2021-01-08 RX ADMIN — OXYCODONE HYDROCHLORIDE 5 MILLIGRAM(S): 5 TABLET ORAL at 03:59

## 2021-01-08 RX ADMIN — PIPERACILLIN AND TAZOBACTAM 25 GRAM(S): 4; .5 INJECTION, POWDER, LYOPHILIZED, FOR SOLUTION INTRAVENOUS at 22:06

## 2021-01-08 RX ADMIN — ENOXAPARIN SODIUM 40 MILLIGRAM(S): 100 INJECTION SUBCUTANEOUS at 04:08

## 2021-01-08 RX ADMIN — Medication 15 UNIT(S): at 08:49

## 2021-01-08 RX ADMIN — OXYCODONE HYDROCHLORIDE 80 MILLIGRAM(S): 5 TABLET ORAL at 20:29

## 2021-01-08 RX ADMIN — Medication 40 MILLIGRAM(S): at 04:08

## 2021-01-08 RX ADMIN — ATORVASTATIN CALCIUM 80 MILLIGRAM(S): 80 TABLET, FILM COATED ORAL at 22:06

## 2021-01-08 RX ADMIN — Medication 2000 UNIT(S): at 12:54

## 2021-01-08 RX ADMIN — Medication 81 MILLIGRAM(S): at 12:54

## 2021-01-08 RX ADMIN — INSULIN GLARGINE 80 UNIT(S): 100 INJECTION, SOLUTION SUBCUTANEOUS at 22:06

## 2021-01-08 RX ADMIN — PIPERACILLIN AND TAZOBACTAM 25 GRAM(S): 4; .5 INJECTION, POWDER, LYOPHILIZED, FOR SOLUTION INTRAVENOUS at 13:48

## 2021-01-08 RX ADMIN — OXYCODONE HYDROCHLORIDE 5 MILLIGRAM(S): 5 TABLET ORAL at 15:19

## 2021-01-08 RX ADMIN — Medication 0.5 MILLIGRAM(S): at 20:34

## 2021-01-08 RX ADMIN — PIPERACILLIN AND TAZOBACTAM 25 GRAM(S): 4; .5 INJECTION, POWDER, LYOPHILIZED, FOR SOLUTION INTRAVENOUS at 04:08

## 2021-01-08 RX ADMIN — Medication 1 APPLICATION(S): at 20:30

## 2021-01-08 RX ADMIN — Medication 1 APPLICATION(S): at 12:56

## 2021-01-08 RX ADMIN — Medication 15 UNIT(S): at 18:04

## 2021-01-08 RX ADMIN — BUDESONIDE AND FORMOTEROL FUMARATE DIHYDRATE 2 PUFF(S): 160; 4.5 AEROSOL RESPIRATORY (INHALATION) at 12:59

## 2021-01-08 RX ADMIN — OXYCODONE HYDROCHLORIDE 80 MILLIGRAM(S): 5 TABLET ORAL at 10:28

## 2021-01-08 RX ADMIN — ENOXAPARIN SODIUM 40 MILLIGRAM(S): 100 INJECTION SUBCUTANEOUS at 18:05

## 2021-01-08 RX ADMIN — POLYETHYLENE GLYCOL 3350 17 GRAM(S): 17 POWDER, FOR SOLUTION ORAL at 18:05

## 2021-01-08 RX ADMIN — Medication 15 UNIT(S): at 12:55

## 2021-01-08 RX ADMIN — INSULIN GLARGINE 80 UNIT(S): 100 INJECTION, SOLUTION SUBCUTANEOUS at 08:48

## 2021-01-08 NOTE — DIETITIAN INITIAL EVALUATION ADULT. - CHIEF COMPLAINT
The patient is a 65y Female , PMH morbid obesity, asthma, CHF, pulmonary HTN, home on 3L NC, T2DM, CKD, HTN, diabetic neuropathy, Charcot foot, chronic venous statis and chronic right foot wound, she presents with worsening erythema, edema, warmth of the right foot. concerning for cellulitis and underlying osteomyelitis

## 2021-01-08 NOTE — DISCHARGE NOTE PROVIDER - HOSPITAL COURSE
Patient is a 65 year old woman with PMH morbid obesity, asthma, CHF, pulmonary HTN, home on 3L NC, T2DM, CKD, HTN, diabetic neuropathy, Charcot foot, chronic venous statis and chronic right foot wound, she presents today with worsening erythema, edema, warmth of the right foot concerning for cellulitis and rule out osteomyelitis. While inpatient Patient is a 65 year old woman with PMH morbid obesity, asthma, CHF, pulmonary HTN, home on 3L NC, T2DM, CKD, HTN, diabetic neuropathy, Charcot foot, chronic venous statis and chronic right foot wound, she presents today with worsening erythema, edema, warmth of the right foot concerning for cellulitis and rule out osteomyelitis. While inpatient she was started on Zosyn (1/6-1/10). She was seen by podiatry and they sent a wound culture of the right heel ulcer and that grew staph aureus that was sensitive to Augmentin. Podiatry opted to not due a bone biopsy since previous bone biopsy was negative. Patient was unable to get MR imaging. Her blood cultures from 1/6 were negative. ID was following and recommended to discharge on a 10 day course of oral antibiotics. Patient was stable for discharge and follow up with PCP and Podiatrist. Patient is a 65 year old woman with PMH morbid obesity, asthma, CHF, pulmonary HTN, home on 3L NC, T2DM, CKD, HTN, diabetic neuropathy, Charcot foot, chronic venous statis and chronic right foot wound, she presents today with worsening erythema, edema, warmth of the right foot concerning for cellulitis and rule out osteomyelitis. While inpatient she was started on Zosyn (1/6-1/10). She was seen by podiatry and they sent a wound culture of the right heel ulcer and that grew staph aureus that was sensitive to Augmentin. Podiatry opted to not do a bone biopsy since previous bone biopsy was negative. Patient was unable to get MR imaging. due to body habitus. Her blood cultures from 1/6 were negative. ID was following and recommended to discharge on a 10 day course of oral antibiotics. Patient was stable for discharge and follow up with PCP and Podiatrist.

## 2021-01-08 NOTE — PROGRESS NOTE ADULT - PROBLEM SELECTOR PLAN 2
Pt has a chronic history of cervical and lumbar spinal pain.  - On 80mg oxycodone BID at home and hydromorphone-acetaminophen 7.25mg/325mg TID PRN at home (will need to bring from home if patient needs)  - will continue home medications while inpatient for pain management Pt has a chronic history of cervical and lumbar spinal pain.  - On 80mg oxycodone BID at home and hydromorphone-acetaminophen 7.25mg/325mg TID PRN at home (will need to bring from home if patient needs)  - oxycodone 5mg q 6hrs PRN for breakthrough pain while inpatient   - will continue home medications while inpatient for pain management

## 2021-01-08 NOTE — PROVIDER CONTACT NOTE (OTHER) - ACTION/TREATMENT ORDERED:
team 2 provider notified and stated he will follow up with podiatry. wound care team consult also ordered as per patient request.

## 2021-01-08 NOTE — PROGRESS NOTE ADULT - PROBLEM SELECTOR PLAN 4
At home on rosuvastatin 20mg qd  - will c/w atorvastatin 80mg while inpatient     #CAD   c/w Aspirin 81mg

## 2021-01-08 NOTE — PROGRESS NOTE ADULT - SUBJECTIVE AND OBJECTIVE BOX
Follow Up:  Foot Ulcer    Interval History:    REVIEW OF SYSTEMS  [  ] ROS unobtainable because:    [  ] All other systems negative except as noted below    Constitutional:  [ ] fever [ ] chills  [ ] weight loss  [ ] weakness  Skin:  [ ] rash [ ] phlebitis	  Eyes: [ ] icterus [ ] pain  [ ] discharge	  ENMT: [ ] sore throat  [ ] thrush [ ] ulcers [ ] exudates  Respiratory: [ ] dyspnea [ ] hemoptysis [ ] cough [ ] sputum	  Cardiovascular:  [ ] chest pain [ ] palpitations [ ] edema	  Gastrointestinal:  [ ] nausea [ ] vomiting [ ] diarrhea [ ] constipation [ ] pain	  Genitourinary:  [ ] dysuria [ ] frequency [ ] hematuria [ ] discharge [ ] flank pain  [ ] incontinence  Musculoskeletal:  [ ] myalgias [ ] arthralgias [ ] arthritis  [ ] back pain  Neurological:  [ ] headache [ ] seizures  [ ] confusion/altered mental status    Allergies  adhesives (Rash)  Bactrim (Flushing)  latex (Rash)  wool- rash, itch (Other)        ANTIMICROBIALS:  piperacillin/tazobactam IVPB.. 3.375 every 8 hours      OTHER MEDS:  MEDICATIONS  (STANDING):  acetaminophen   Tablet .. 650 once  ALBUTerol    90 MICROgram(s) HFA Inhaler 2 every 6 hours PRN  aspirin enteric coated 81 daily  atorvastatin 80 at bedtime  budesonide  80 MICROgram(s)/formoterol 4.5 MICROgram(s) Inhaler 2 daily  clonazePAM  Tablet 0.5 at bedtime PRN  dextrose 40% Gel 15 once  dextrose 50% Injectable 25 once  dextrose 50% Injectable 12.5 once  dextrose 50% Injectable 25 once  enoxaparin Injectable 40 two times a day  furosemide    Tablet 40 daily  glucagon  Injectable 1 once  insulin glargine Injectable (LANTUS) 80 two times a day  insulin lispro (ADMELOG) corrective regimen sliding scale  three times a day before meals  insulin lispro (ADMELOG) corrective regimen sliding scale  at bedtime  insulin lispro Injectable (ADMELOG) 15 three times a day before meals  ondansetron    Tablet 4 daily PRN  oxyCODONE    IR 5 every 6 hours PRN  oxyCODONE  ER Tablet 80 every 12 hours  polyethylene glycol 3350 17 two times a day  senna 2 at bedtime      Vital Signs Last 24 Hrs  T(C): 37.1 (2021 08:55), Max: 37.1 (2021 08:55)  T(F): 98.8 (2021 08:55), Max: 98.8 (2021 08:55)  HR: 74 (2021 08:55) (68 - 78)  BP: 144/69 (2021 08:55) (108/54 - 144/69)  BP(mean): --  RR: 18 (2021 08:55) (18 - 20)  SpO2: 96% (2021 08:55) (92% - 96%)    PHYSICAL EXAMINATION:  General: Alert and Awake, NAD  HEENT: PERRL, EOMI  Neck: Supple  Cardiac: RRR, No M/R/G  Resp: CTAB, No Wh/Rh/Ra  Abdomen: NBS, NT/ND, No HSM, No rigidity or guarding  MSK: No LE edema. No Calf tenderness  : No de la torre  Skin: No rashes or lesions. Skin is warm and dry to the touch.   Neuro: Alert and Awake. CN 2-12 Grossly intact. Moves all four extremities spontaneously.  Psych: Calm, Pleasant, Cooperative                          12.0   8.54  )-----------( 170      ( 2021 06:54 )             39.2       01-08    137  |  99  |  29<H>  ----------------------------<  127<H>  4.0   |  26  |  1.77<H>    Ca    9.3      2021 06:54  Phos  3.1       Mg     2.0         TPro  6.7  /  Alb  4.0  /  TBili  0.5  /  DBili  x   /  AST  11  /  ALT  14  /  AlkPhos  96        Urinalysis Basic - ( 2021 20:48 )    Color: Light Yellow / Appearance: Clear / S.017 / pH: x  Gluc: x / Ketone: Negative  / Bili: Negative / Urobili: Negative   Blood: x / Protein: Trace / Nitrite: Negative   Leuk Esterase: Negative / RBC: 1 /hpf / WBC 0 /HPF   Sq Epi: x / Non Sq Epi: 0 /hpf / Bacteria: Negative        MICROBIOLOGY:  v  .Blood Blood  21   No growth to date.  --  --                RADIOLOGY:    <The imaging below has been reviewed and visualized by me independently. Findings as detailed in report below> Follow Up:  Foot Ulcer    Interval History: could not fit into MRI scanner when attempted. notes somewhat less erythema in her feet today. afebrile.     REVIEW OF SYSTEMS  [  ] ROS unobtainable because:    [ x ] All other systems negative except as noted below    Constitutional:  [ ] fever [ ] chills  [ ] weight loss  [ ] weakness  Skin:  [ ] rash [ ] phlebitis	  Eyes: [ ] icterus [ ] pain  [ ] discharge	  ENMT: [ ] sore throat  [ ] thrush [ ] ulcers [ ] exudates  Respiratory: [ ] dyspnea [ ] hemoptysis [ ] cough [ ] sputum	  Cardiovascular:  [ ] chest pain [ ] palpitations [ ] edema	  Gastrointestinal:  [ ] nausea [ ] vomiting [ ] diarrhea [ ] constipation [ ] pain	  Genitourinary:  [ ] dysuria [ ] frequency [ ] hematuria [ ] discharge [ ] flank pain  [ ] incontinence  Musculoskeletal:  [ ] myalgias [ ] arthralgias [ ] arthritis  [ ] back pain +R Foot Ulcer   Neurological:  [ ] headache [ ] seizures  [ ] confusion/altered mental status    Allergies  adhesives (Rash)  Bactrim (Flushing)  latex (Rash)  wool- rash, itch (Other)        ANTIMICROBIALS:  piperacillin/tazobactam IVPB.. 3.375 every 8 hours      OTHER MEDS:  MEDICATIONS  (STANDING):  acetaminophen   Tablet .. 650 once  ALBUTerol    90 MICROgram(s) HFA Inhaler 2 every 6 hours PRN  aspirin enteric coated 81 daily  atorvastatin 80 at bedtime  budesonide  80 MICROgram(s)/formoterol 4.5 MICROgram(s) Inhaler 2 daily  clonazePAM  Tablet 0.5 at bedtime PRN  dextrose 40% Gel 15 once  dextrose 50% Injectable 25 once  dextrose 50% Injectable 12.5 once  dextrose 50% Injectable 25 once  enoxaparin Injectable 40 two times a day  furosemide    Tablet 40 daily  glucagon  Injectable 1 once  insulin glargine Injectable (LANTUS) 80 two times a day  insulin lispro (ADMELOG) corrective regimen sliding scale  three times a day before meals  insulin lispro (ADMELOG) corrective regimen sliding scale  at bedtime  insulin lispro Injectable (ADMELOG) 15 three times a day before meals  ondansetron    Tablet 4 daily PRN  oxyCODONE    IR 5 every 6 hours PRN  oxyCODONE  ER Tablet 80 every 12 hours  polyethylene glycol 3350 17 two times a day  senna 2 at bedtime      Vital Signs Last 24 Hrs  T(C): 37.1 (2021 08:55), Max: 37.1 (2021 08:55)  T(F): 98.8 (2021 08:55), Max: 98.8 (2021 08:55)  HR: 74 (2021 08:55) (68 - 78)  BP: 144/69 (2021 08:55) (108/54 - 144/69)  BP(mean): --  RR: 18 (2021 08:55) (18 - 20)  SpO2: 96% (2021 08:55) (92% - 96%)    PHYSICAL EXAMINATION:  General: Alert and Awake, NAD  HEENT: PERRL, EOMI, No subconjunctival hemorrhages, Oropharynx Clear, MMM  Neck: Supple, No ISH  Cardiac: RRR, No M/R/G  Resp: CTAB, No Wh/Rh/Ra  Abdomen: Obese, NBS, NT/ND, No HSM, No rigidity or guarding  MSK: RLE Foot with overlying dressing, erythema of the soles of both feet appears somewhat improved today   : No de la torre  Skin: No rashes or lesions. Skin is warm and dry to the touch.   Neuro: Alert and Awake. CN 2-12 Grossly intact. Moves all four extremities spontaneously.  Psych: Calm, Pleasant, Cooperative                          12.0   8.54  )-----------( 170      ( 2021 06:54 )             39.2       01-08    137  |  99  |  29<H>  ----------------------------<  127<H>  4.0   |  26  |  1.77<H>    Ca    9.3      2021 06:54  Phos  3.1     01-08  Mg     2.0     01-08    TPro  6.7  /  Alb  4.0  /  TBili  0.5  /  DBili  x   /  AST  11  /  ALT  14  /  AlkPhos  96  01-06      Urinalysis Basic - ( 2021 20:48 )    Color: Light Yellow / Appearance: Clear / S.017 / pH: x  Gluc: x / Ketone: Negative  / Bili: Negative / Urobili: Negative   Blood: x / Protein: Trace / Nitrite: Negative   Leuk Esterase: Negative / RBC: 1 /hpf / WBC 0 /HPF   Sq Epi: x / Non Sq Epi: 0 /hpf / Bacteria: Negative    MICROBIOLOGY:    mCulture - Abscess with Gram Stain (21 @ 14:59)   Specimen Source: .Abscess r foot   Culture Results:   Few Staphylococcus aureus     .Blood Blood  21   No growth to date.  --  --    RADIOLOGY:    <The imaging below has been reviewed and visualized by me independently. Findings as detailed in report below>    EXAM:  FOOT 2VIEWS RT                        PROCEDURE DATE:  2021    Shallow ulceration at the plantar midfoot.  No underlying cortical erosion at the base of the fifth metatarsal, concerning for osteomyelitis. If clinically warranted sectional imaging should be obtained.

## 2021-01-08 NOTE — DISCHARGE NOTE PROVIDER - DETAILS OF MALNUTRITION DIAGNOSIS/DIAGNOSES
This patient has been assessed with a concern for Malnutrition and was treated during this hospitalization for the following Nutrition diagnosis/diagnoses:     -  01/08/2021: Morbid obesity (BMI > 40)   This patient has been assessed with a concern for Malnutrition and was treated during this hospitalization for the following Nutrition diagnosis/diagnoses:     -  01/08/2021: Morbid obesity (BMI > 40)    This patient has been assessed with a concern for Malnutrition and was treated during this hospitalization for the following Nutrition diagnosis/diagnoses:     -  01/08/2021: Morbid obesity (BMI > 40)   This patient has been assessed with a concern for Malnutrition and was treated during this hospitalization for the following Nutrition diagnosis/diagnoses:     -  01/08/2021: Morbid obesity (BMI > 40)    This patient has been assessed with a concern for Malnutrition and was treated during this hospitalization for the following Nutrition diagnosis/diagnoses:     -  01/08/2021: Morbid obesity (BMI > 40)    This patient has been assessed with a concern for Malnutrition and was treated during this hospitalization for the following Nutrition diagnosis/diagnoses:     -  01/08/2021: Morbid obesity (BMI > 40)   This patient has been assessed with a concern for Malnutrition and was treated during this hospitalization for the following Nutrition diagnosis/diagnoses:     -  01/08/2021: Morbid obesity (BMI > 40)    This patient has been assessed with a concern for Malnutrition and was treated during this hospitalization for the following Nutrition diagnosis/diagnoses:     -  01/08/2021: Morbid obesity (BMI > 40)    This patient has been assessed with a concern for Malnutrition and was treated during this hospitalization for the following Nutrition diagnosis/diagnoses:     -  01/08/2021: Morbid obesity (BMI > 40)    This patient has been assessed with a concern for Malnutrition and was treated during this hospitalization for the following Nutrition diagnosis/diagnoses:     -  01/08/2021: Morbid obesity (BMI > 40)

## 2021-01-08 NOTE — PROGRESS NOTE ADULT - SUBJECTIVE AND OBJECTIVE BOX
Patient is a 65y old  Female who presents with a chief complaint of Right foot wound (2021 11:22)       INTERVAL HPI/OVERNIGHT EVENTS:  Patient seen and evaluated at bedside.  Pt is resting comfortable in NAD. Denies N/V/F/C.   Allergies    adhesives (Rash)  Bactrim (Flushing)  latex (Rash)  wool- rash, itch (Other)    Intolerances        Vital Signs Last 24 Hrs  T(C): 37.1 (2021 08:55), Max: 37.1 (2021 08:55)  T(F): 98.8 (2021 08:55), Max: 98.8 (2021 08:55)  HR: 74 (2021 08:55) (68 - 78)  BP: 144/69 (2021 08:55) (114/67 - 144/69)  BP(mean): --  RR: 18 (2021 08:55) (18 - 20)  SpO2: 96% (2021 08:55) (92% - 96%)    LABS:                        12.0   8.54  )-----------( 170      ( 2021 06:54 )             39.2     01-08    137  |  99  |  29<H>  ----------------------------<  127<H>  4.0   |  26  |  1.77<H>    Ca    9.3      2021 06:54  Phos  3.1     01-08  Mg     2.0     -08    TPro  6.7  /  Alb  4.0  /  TBili  0.5  /  DBili  x   /  AST  11  /  ALT  14  /  AlkPhos  96  01-06    PT/INR - ( 2021 16:41 )   PT: 12.2 sec;   INR: 1.02 ratio         PTT - ( 2021 16:41 )  PTT:26.9 sec  Urinalysis Basic - ( 2021 20:48 )    Color: Light Yellow / Appearance: Clear / S.017 / pH: x  Gluc: x / Ketone: Negative  / Bili: Negative / Urobili: Negative   Blood: x / Protein: Trace / Nitrite: Negative   Leuk Esterase: Negative / RBC: 1 /hpf / WBC 0 /HPF   Sq Epi: x / Non Sq Epi: 0 /hpf / Bacteria: Negative      CAPILLARY BLOOD GLUCOSE      POCT Blood Glucose.: 161 mg/dL (2021 12:24)  POCT Blood Glucose.: 137 mg/dL (2021 08:27)  POCT Blood Glucose.: 170 mg/dL (2021 21:31)  POCT Blood Glucose.: 141 mg/dL (2021 17:38)      Lower Extremity Physical Exam:  Vascular: DP/PT 1/4 B/L, CFT <3 seconds B/L, Temperature gradient warm to cool B/L  Neuro: Epicritic sensation diminished to the level of midfoot B/L  Musculoskeletal/Ortho: charcot foot deformity R, non-ambi  Skin: Right plantar midfoot wound to subq, no purulence, no fluctuance, no malodor, hyperkeratotic borders, erythema of R foot limited to plantar and dorsal forefoot, blancheable erythema ; L foot showing signs of erythema noted to forefoot, L forefoot warm to touch, no open wounds to L foot    RADIOLOGY & ADDITIONAL TESTS:    < from: Xray Foot AP + Lateral, Right (21 @ 14:05) >    EXAM:  FOOT 2VIEWS RT                            PROCEDURE DATE:  2021            INTERPRETATION:  CLINICAL INDICATION: Right heel/midfoot ulcer with cellulitis, question osteomyelitis    TECHNIQUE:  2 views of the right foot.    COMPARISON: Right foot x-ray from 2020    FINDINGS:    Diffuse soft tissue swelling.  Shallow ulceration at the plantar midfoot without evidence of subcutaneous tracking gas. There is underlying new cortical erosion at the base of the fifth metatarsal.  There are no acute displaced fractures or dislocations.  Redemonstration of mid foot Charcot arthropathy and heterotopic ossification in the region of the Achilles tendon.    IMPRESSION:  Shallow ulceration at the plantar midfoot.  No underlying cortical erosion at the base of the fifth metatarsal, concerning for osteomyelitis. If clinically warranted sectional imaging should be obtained.              ABDULLAHI KOWALSKI MD; Resident Radiology  This document has been electronically signed.  HENRY BROWN MD; Attending Radiologist  This document has been electronically signed. 2021  3:58PM    < end of copied text >

## 2021-01-08 NOTE — PROVIDER CONTACT NOTE (OTHER) - REASON
patient needs right foot wound dressing orders. patient states the current dressing was done by infectious disease Doctor

## 2021-01-08 NOTE — PROGRESS NOTE ADULT - PROBLEM SELECTOR PLAN 6
4/11/2019      Cat Goldstein  [address] [place] [id number]                        This is to certify that Cat Gold[facility]  and is unable to return to work until Monday, April 15.        SIGNATURE:___________________________________________,   4/11/2019      Viraj Ramirez DO          [facility]  [address] [facility] [address]  [phone]   Pt with recently diagnosed asthma, on Advair HFA qd and Albuterol PRN at home  - continue with symbicort and albuterol while inpatient

## 2021-01-08 NOTE — PROGRESS NOTE ADULT - SUBJECTIVE AND OBJECTIVE BOX
Lexus Arredondo MD   Internal Medicine PGY-1  Pager: NS: [phone] [facility]: [id number]    Patient is a 65y old  Female who presents with a chief complaint of Right foot wound (2021 13:00)      SUBJECTIVE / OVERNIGHT EVENTS:    MEDICATIONS  (STANDING):  acetaminophen   Tablet .. 650 milliGRAM(s) Oral once  AQUAPHOR (petrolatum Ointment) 1 Application(s) Topical daily  aspirin enteric coated 81 milliGRAM(s) Oral daily  atorvastatin 80 milliGRAM(s) Oral at bedtime  budesonide  80 MICROgram(s)/formoterol 4.5 MICROgram(s) Inhaler 2 Puff(s) Inhalation daily  cholecalciferol 2000 Unit(s) Oral daily  dextrose 40% Gel 15 Gram(s) Oral once  dextrose 5%. 1000 milliLiter(s) (50 mL/Hr) IV Continuous <Continuous>  dextrose 5%. 1000 milliLiter(s) (100 mL/Hr) IV Continuous <Continuous>  dextrose 50% Injectable 25 Gram(s) IV Push once  dextrose 50% Injectable 12.5 Gram(s) IV Push once  dextrose 50% Injectable 25 Gram(s) IV Push once  enoxaparin Injectable 40 milliGRAM(s) SubCutaneous two times a day  furosemide    Tablet 40 milliGRAM(s) Oral daily  glucagon  Injectable 1 milliGRAM(s) IntraMuscular once  insulin glargine Injectable (LANTUS) 80 Unit(s) SubCutaneous two times a day  insulin lispro (ADMELOG) corrective regimen sliding scale   SubCutaneous three times a day before meals  insulin lispro (ADMELOG) corrective regimen sliding scale   SubCutaneous at bedtime  insulin lispro Injectable (ADMELOG) 15 Unit(s) SubCutaneous three times a day before meals  oxyCODONE  ER Tablet 80 milliGRAM(s) Oral every 12 hours  piperacillin/tazobactam IVPB.. 3.375 Gram(s) IV Intermittent every 8 hours  polyethylene glycol 3350 17 Gram(s) Oral daily    MEDICATIONS  (PRN):  ALBUTerol    90 MICROgram(s) HFA Inhaler 2 Puff(s) Inhalation every 6 hours PRN Shortness of Breath  clonazePAM  Tablet 0.5 milliGRAM(s) Oral at bedtime PRN Insomnia  ondansetron    Tablet 4 milliGRAM(s) Oral daily PRN Nausea and/or Vomiting  oxyCODONE    IR 5 milliGRAM(s) Oral every 6 hours PRN breakthrough pain      T(C): 36.7 (21 @ 04:07), Max: 36.8 (21 @ 00:35)  HR: 68 (21 @ 04:07) (68 - 78)  BP: 114/67 (21 @ 04:07) (108/54 - 133/62)  RR: 19 (21 @ 04:07) (18 - 20)  SpO2: 94% (21 @ 04:07) (93% - 96%)  CAPILLARY BLOOD GLUCOSE      POCT Blood Glucose.: 170 mg/dL (2021 21:31)  POCT Blood Glucose.: 141 mg/dL (2021 17:38)  POCT Blood Glucose.: 250 mg/dL (2021 12:13)  POCT Blood Glucose.: 183 mg/dL (2021 08:13)    I&O's Summary    2021 07:01  -  2021 07:00  --------------------------------------------------------  IN: 1320 mL / OUT: 3800 mL / NET: -2480 mL        REVIEW OF SYSTEMS    General: No fevers/chills  Skin/Breast: No rash  Ophthalmologic: No vision changes  ENMT:No dysphagia or odynophagia  Respiratory and Thorax: No SOB, wheezing, cough  Cardiovascular: No chest pain or palpitations  Gastrointestinal: No n/v/d, No melena, No Hematochezia  Genitourinary:  No dysuria, No change in urinary frequency  Musculoskeletal: No joint or muscle pains.  Neurological: No focal deficits, No headache    PHYSICAL EXAM:  GENERAL: NAD, well-developed  HEAD:  Atraumatic, Normocephalic  EYES: EOMI, PERRLA, conjunctiva and sclera clear  NECK: Supple, No JVD  CHEST/LUNG: Clear to auscultation bilaterally; No wheeze  HEART: Regular rate and rhythm; No murmurs, rubs, or gallops  ABDOMEN: Soft, Nontender, Nondistended; Bowel sounds present  EXTREMITIES:  2+ Peripheral Pulses, No clubbing, cyanosis, or edema  PSYCH: AAOx3  NEUROLOGY: non-focal  SKIN: No rashes or lesions    LABS:                        12.0   8.54  )-----------( 170      ( 2021 06:54 )             39.2     -08    137  |  99  |  29<H>  ----------------------------<  127<H>  4.0   |  26  |  1.77<H>    Ca    9.3      2021 06:54  Phos  3.1     -  Mg     2.0         TPro  6.7  /  Alb  4.0  /  TBili  0.5  /  DBili  x   /  AST  11  /  ALT  14  /  AlkPhos  96  -06    PT/INR - ( 2021 16:41 )   PT: 12.2 sec;   INR: 1.02 ratio         PTT - ( 2021 16:41 )  PTT:26.9 sec      Urinalysis Basic - ( 2021 20:48 )    Color: Light Yellow / Appearance: Clear / S.017 / pH: x  Gluc: x / Ketone: Negative  / Bili: Negative / Urobili: Negative   Blood: x / Protein: Trace / Nitrite: Negative   Leuk Esterase: Negative / RBC: 1 /hpf / WBC 0 /HPF   Sq Epi: x / Non Sq Epi: 0 /hpf / Bacteria: Negative    RADIOLOGY & ADDITIONAL TESTS:    Imaging Personally Reviewed:    Consultant(s) Notes Reviewed:      Care Discussed with Consultants/Other Providers:   Lexus Arredondo MD   Internal Medicine PGY-1  Pager: NS: [phone] [facility]: [id number]    Patient is a 65y old  Female who presents with a chief complaint of Right foot wound (2021 13:00)    SUBJECTIVE / OVERNIGHT EVENTS:  No acute events overnights. Pt was supposed to go for MRI yesterday but difficulty positioning patient and was unable to get it done. ID started patient on Zosyn. Wound culture sent by podiatry yesterday. Was able to work with PT yesterday. Tolerating diet. She denies any fevers, chills, n/v, chest pain, cough, abdominal pain, dysuria. No other complaints.     MEDICATIONS  (STANDING):  acetaminophen   Tablet .. 650 milliGRAM(s) Oral once  AQUAPHOR (petrolatum Ointment) 1 Application(s) Topical daily  aspirin enteric coated 81 milliGRAM(s) Oral daily  atorvastatin 80 milliGRAM(s) Oral at bedtime  budesonide  80 MICROgram(s)/formoterol 4.5 MICROgram(s) Inhaler 2 Puff(s) Inhalation daily  cholecalciferol 2000 Unit(s) Oral daily  dextrose 40% Gel 15 Gram(s) Oral once  dextrose 5%. 1000 milliLiter(s) (50 mL/Hr) IV Continuous <Continuous>  dextrose 5%. 1000 milliLiter(s) (100 mL/Hr) IV Continuous <Continuous>  dextrose 50% Injectable 25 Gram(s) IV Push once  dextrose 50% Injectable 12.5 Gram(s) IV Push once  dextrose 50% Injectable 25 Gram(s) IV Push once  enoxaparin Injectable 40 milliGRAM(s) SubCutaneous two times a day  furosemide    Tablet 40 milliGRAM(s) Oral daily  glucagon  Injectable 1 milliGRAM(s) IntraMuscular once  insulin glargine Injectable (LANTUS) 80 Unit(s) SubCutaneous two times a day  insulin lispro (ADMELOG) corrective regimen sliding scale   SubCutaneous three times a day before meals  insulin lispro (ADMELOG) corrective regimen sliding scale   SubCutaneous at bedtime  insulin lispro Injectable (ADMELOG) 15 Unit(s) SubCutaneous three times a day before meals  oxyCODONE  ER Tablet 80 milliGRAM(s) Oral every 12 hours  piperacillin/tazobactam IVPB.. 3.375 Gram(s) IV Intermittent every 8 hours  polyethylene glycol 3350 17 Gram(s) Oral daily    MEDICATIONS  (PRN):  ALBUTerol    90 MICROgram(s) HFA Inhaler 2 Puff(s) Inhalation every 6 hours PRN Shortness of Breath  clonazePAM  Tablet 0.5 milliGRAM(s) Oral at bedtime PRN Insomnia  ondansetron    Tablet 4 milliGRAM(s) Oral daily PRN Nausea and/or Vomiting  oxyCODONE    IR 5 milliGRAM(s) Oral every 6 hours PRN breakthrough pain    T(C): 36.7 (21 @ 04:07), Max: 36.8 (21 @ 00:35)  HR: 68 (21 @ 04:07) (68 - 78)  BP: 114/67 (21 @ 04:07) (108/54 - 133/62)  RR: 19 (21 @ 04:07) (18 - 20)  SpO2: 94% (21 @ 04:07) (93% - 96%)    CAPILLARY BLOOD GLUCOSE  POCT Blood Glucose.: 170 mg/dL (2021 21:31)  POCT Blood Glucose.: 141 mg/dL (2021 17:38)  POCT Blood Glucose.: 250 mg/dL (2021 12:13)  POCT Blood Glucose.: 183 mg/dL (2021 08:13)    I&O's Summary    2021 07:01  -  2021 07:00  --------------------------------------------------------  IN: 1320 mL / OUT: 3800 mL / NET: -2480 mL    REVIEW OF SYSTEMS  General: No fevers/chills  Skin/Breast: + erythema of hands with vancomycin  Ophthalmologic: No vision changes  ENMT: No dysphagia or odynophagia  Respiratory and Thorax: No SOB, wheezing, cough,  Cardiovascular: No chest pain or palpitations  Gastrointestinal: No n/v/d, No melena, No Hematochezia  Genitourinary:  No dysuria, No change in urinary frequency  Musculoskeletal: No joint or muscle pains.  Neurological: No focal deficits, No headache    PHYSICAL EXAM:  GENERAL: NAD, well-developed  HEAD: Atraumatic, Normocephalic  EYES: EOMI, conjunctiva and sclera clear  NECK: Supple, No JVD  CHEST/LUNG: Clear to auscultation bilaterally; No wheeze or crackles, distant lung sounds  HEART: Regular rate and rhythm; No murmurs, rubs, or gallops, distant heart sounds  ABDOMEN: Soft, Nontender, Nondistended; Bowel sounds present  EXTREMITIES:  2+ Peripheral Pulses, No clubbing, cyanosis, or edema  PSYCH: AAOx3  NEUROLOGY: non-focal  SKIN: right foot erythema, swelling, and warmth, ulcer at the bottom of the right foot, right foot dressing c/d/i    LABS:                        12.0   8.54  )-----------( 170      ( 2021 06:54 )             39.2     01-08    137  |  99  |  29<H>  ----------------------------<  127<H>  4.0   |  26  |  1.77<H>    Ca    9.3      2021 06:54  Phos  3.1     01-08  Mg     2.0     01-08    TPro  6.7  /  Alb  4.0  /  TBili  0.5  /  DBili  x   /  AST  11  /  ALT  14  /  AlkPhos  96  01-06    PT/INR - ( 2021 16:41 )   PT: 12.2 sec;   INR: 1.02 ratio       PTT - ( 2021 16:41 )  PTT:26.9 sec    Urinalysis Basic - ( 2021 20:48 )    Color: Light Yellow / Appearance: Clear / S.017 / pH: x  Gluc: x / Ketone: Negative  / Bili: Negative / Urobili: Negative   Blood: x / Protein: Trace / Nitrite: Negative   Leuk Esterase: Negative / RBC: 1 /hpf / WBC 0 /HPF   Sq Epi: x / Non Sq Epi: 0 /hpf / Bacteria: Negative    RADIOLOGY & ADDITIONAL TESTS:    Imaging Personally Reviewed: Reviewed    Consultant(s) Notes Reviewed: Podiatry, ID    Care Discussed with Consultants/Other Providers: Podiatry, ID

## 2021-01-08 NOTE — PROGRESS NOTE ADULT - PROBLEM SELECTOR PLAN 1
Pt with recurrent right foot infections recently admitted in November for cellulitis / osteomyelitis, now presenting with warmth, erythema, and tenderness of right foot concerning for cellulitis r/o osteomyelitis  -s/p Zosyn and Vancomycin in the ED  -will continue on Zosyn (1/6- ) and Vancomycin (1/6- )  -f/u blood cultures  -X-ray of right foot, concerning for osteomyelitis  -MRI of right foot w/wo contrast to further evaluate ordered  -Podiatry consulted  -ID consulted Pt with recurrent right foot infections recently admitted in November for cellulitis / osteomyelitis, now presenting with warmth, erythema, and tenderness of right foot concerning for cellulitis r/o osteomyelitis  -s/p Zosyn and Vancomycin in the ED  -will continue on Zosyn (1/6- ) Day 3 and s/p Vancomycin (1/6)  -f/u blood cultures  -X-ray of right foot, concerning for osteomyelitis  -MRI of right foot w/wo contrast unable to obtain due to BMI and pain  -Podiatry following, appreciate recs  -ID following, appreciate recs

## 2021-01-08 NOTE — DISCHARGE NOTE PROVIDER - NSDCFUADDAPPT_GEN_ALL_CORE_FT
Please follow up with Dr. Mccann and Podiatry 1 week of discharge. Please follow podiatry instructions listed below

## 2021-01-08 NOTE — DISCHARGE NOTE PROVIDER - NSDCCPCAREPLAN_GEN_ALL_CORE_FT
PRINCIPAL DISCHARGE DIAGNOSIS  Diagnosis: Cellulitis of foot, right  Assessment and Plan of Treatment:        PRINCIPAL DISCHARGE DIAGNOSIS  Diagnosis: Cellulitis of foot, right  Assessment and Plan of Treatment: You came in with right foot warmth, eythema and swelling, you were treated with IV abx. The podiatry team saw you and sent a wound culture, that grew staph aureus sensitive to oral abx. Please continue the abx course as prescribed. The Infectious Disease team also saw you during your stay and didn't recommend a prolonged course of IV abx, they recommended completing a 10 day total course of abx. Please follow up with Dr. Mccann and Dr. Riley in 1 week. If you have any worsening erythema, swelling or pain of the right foot please return to the ED. Wound Care: Please cleanse R foot wound with saline, apply santyl and redress with 4x4 gauze, ABD pad and yasir daily.

## 2021-01-08 NOTE — DIETITIAN NUTRITION RISK NOTIFICATION - TREATMENT: THE FOLLOWING DIET HAS BEEN RECOMMENDED
Diet, DASH/TLC:   Sodium & Cholesterol Restricted  Consistent Carbohydrate {Evening Snack} (CSTCHOSN) (01-06-21 @ 18:14) [Active]

## 2021-01-08 NOTE — DISCHARGE NOTE PROVIDER - CARE PROVIDER_API CALL
Paula Mccann)  Internal Medicine  [address] [facility] [facility], [address]  [place] [address]  Phone: [phone]  Fax: [phone]  Established Patient  Follow Up Time: 1 week

## 2021-01-08 NOTE — DIETITIAN INITIAL EVALUATION ADULT. - REASON INDICATOR FOR ASSESSMENT
Pt seen for consult for pressure injury stage 2.  Information obtained from Pt and electronic medical services. Pt seen for consult for pressure injury stage 2. Pt meets criteria for BMI >40.   Information obtained from Pt and electronic medical services.

## 2021-01-08 NOTE — PROGRESS NOTE ADULT - PROBLEM SELECTOR PLAN 3
Last Hgba1c - 10.4 in 11/20  - At home on Levemir 80 units BID, and Novolog 15 units before meals  - while inpatient will start on 80 Lantus BID, and 12 units Novolog before meals Last Hgba1c - 10.4 in 11/20  - At home on Levemir 80 units BID, and Novolog 15 units before meals  - while inpatient will start on 80 Lantus BID, and 15 units Novolog before meals

## 2021-01-08 NOTE — CHART NOTE - NSCHARTNOTEFT_GEN_A_CORE
Search Terms: Yanet Perez, 1955Search Date: 01/06/2021 19:02:05 PM  Searching on behalf of: [address] [place] [address]  The Drug Utilization Report below displays all of the controlled substance prescriptions, if any, that your patient has filled in the last twelve months. The information displayed on this report is compiled from pharmacy submissions to the Department, and accurately reflects the information as submitted by the pharmacies.    This report was requested by: Lexus Arredondo | Reference #: [id number]    Others' Prescriptions  Patient Name: Yanet PerezBirth Date: 1955  Address: [address] [place], [place] [id number]: Female  Rx Written	Rx Dispensed	Drug	Quantity	Days Supply	Prescriber Name	Payment Method	Dispenser  12/23/2020	12/24/2020	clonazepam 0.5 mg tablet	30	30	Tatum Matt)	Medicare	[id number]  12/23/2020	12/24/2020	hydrocodone-acetaminophen 7.5-325 mg tablet	90	30	Tatum Matt)	Medicare	[id number]  12/09/2020	12/09/2020	oxycontin er 80 mg tablet	60	30	ZolPaula elise MD	Medicare	[id number]  11/27/2020	11/27/2020	clonazepam 0.5 mg tablet	30	30	ZolPaula elise MD	Insurance	[id number]  11/27/2020	11/27/2020	hydrocodone-acetaminophen 7.5-325 mg tablet	90	30	Paula Mccann MD	Insurance	[id number]  11/04/2020	11/08/2020	oxycontin er 80 mg tablet	60	30	ZolPaula elise MD	Insurance	[id number]  10/19/2020	10/21/2020	hydrocodone-acetaminophen 7.5-325 mg tablet	90	30	Paula Mccann MD	Insurance	[id number]  10/19/2020	10/21/2020	clonazepam 0.5 mg tablet	30	30	Paula Mccann MD	Insurance	[id number]  10/06/2020	10/12/2020	oxycontin er 80 mg tablet	50	30	Paula Mccann MD	Insurance	[id number]  10/06/2020	10/10/2020	oxycontin er 80 mg tablet	10	5	ZolPaula elise MD	Insurance	[id number]  09/22/2020	09/22/2020	hydrocodone-acetaminophen 7.5-325 mg tablet	90	30	ZolPaula elise MD	Insurance	[id number]  09/22/2020	09/22/2020	clonazepam 0.5 mg tablet	30	30	ZolPaula elise MD	Insurance	[id number]  09/09/2020	09/11/2020	oxycontin er 80 mg tablet	60	30	ZolliPaula MD	Insurance	[id number]  08/24/2020	08/24/2020	clonazepam 0.5 mg tablet	30	30	ZolPaula elise MD	Insurance	[id number]  08/24/2020	08/24/2020	hydrocodone-acetaminophen 7.5-325 mg tablet	90	30	ZolPaula elise MD	Insurance	[id number]  08/12/2020	08/13/2020	oxycontin er 80 mg tablet	60	30	ZolPaula elise MD	Insurance	[id number]  07/20/2020	07/24/2020	hydrocodone-acetaminophen 7.5-325 mg tablet	90	30	ZolPaula elise MD	Medicare	[id number]  07/20/2020	07/24/2020	clonazepam 0.5 mg tablet	30	30	ZolPaula elise MD	Medicare	[id number]  07/09/2020	07/15/2020	oxycontin er 80 mg tablet	10	5	Paula Mccann MD	Insurance	[id number]  06/23/2020	06/25/2020	hydrocodone-acetaminophen 7.5-325 mg tablet	90	30	Tatum Matt)	Insurance	[id number]  06/23/2020	06/25/2020	clonazepam 0.5 mg tablet	30	30	Tatum Matt)	Insurance	[id number]  06/10/2020	06/16/2020	oxycontin er 80 mg tablet	60	30	ZolPaula elise MD	Insurance	[id number]  05/27/2020	05/27/2020	clonazepam 0.5 mg tablet	30	30	ZolliPaula MD	Insurance	[id number]  05/27/2020	05/27/2020	hydrocodone-acetaminophen 7.5-325 mg tablet	90	30	ZolliPaula MD	Insurance	[id number]  05/12/2020	05/18/2020	oxycontin er 80 mg tablet	60	30	ZolliPaula MD	Insurance	[id number]  04/24/2020	05/02/2020	hydrocodone-acetaminophen 7.5-325 mg tablet	69	23	ZolliPaula MD	Medicare	[id number]  04/24/2020	04/24/2020	hydrocodone-acetaminophen 7.5-325 mg tablet	21	7	ZolliPaula MD	Medicare	[id number]  04/24/2020	04/24/2020	clonazepam 0.5 mg tablet	30	30	ZolliPaula MD	Medicare	[id number]  04/13/2020	04/20/2020	oxycontin er 80 mg tablet	50	30	ZolliPaula MD	Insurance	[id number]  04/13/2020	04/19/2020	oxycontin er 80 mg tablet	10	5	ZolliPaula MD	Insurance	[id number]  03/27/2020	04/02/2020	hydrocodone-acetaminophen 7.5-325 mg tablet	90	30	ZolliPaula MD	Insurance	[id number]  03/27/2020	04/02/2020	clonazepam 0.5 mg tablet	30	30	ZolliPaula MD	Insurance	[id number]  03/16/2020	03/21/2020	oxycontin er 80 mg tablet	60	30	ZolliPaula MD	Insurance	[id number]  02/28/2020	03/04/2020	hydrocodone-acetaminophen 7.5-325 mg tablet	90	30	ZolliPaula MD	Insurance	[id number]  02/28/2020	03/04/2020	clonazepam 0.5 mg tablet	30	30	ZolliPaula MD	Insurance	[id number]  02/20/2020	02/20/2020	oxycontin er 80 mg tablet	60	30	ZolliPaula MD	Insurance	[id number]  02/03/2020	02/04/2020	clonazepam 0.5 mg tablet	30	30	ZolliPaula MD	Insurance	[id number]  02/03/2020	02/04/2020	hydrocodone-acetaminophen 7.5-325 mg tablet	90	30	Paula Mccann MD	[id number]  01/21/2020	01/22/2020	oxycontin er 80 mg tablet	60	30	Paula Mccann MD	[id number]
Wound Care Team Note:    Request for wound care consult for foot wounds received. Patient seen by Podiatry for foot wound. Will defer to Podiatry for foot wound management. Will not follow.    Radha Tim NP-C, [facility] [id number]

## 2021-01-08 NOTE — DISCHARGE NOTE PROVIDER - NSDCFUADDINST_GEN_ALL_CORE_FT
Podiatry Discharge Instructions:  Follow up: Please follow up with Dr. Riley within 1 week of discharge from the hospital, please call [phone] for appointment and discuss that you recently were seen in the hospital.  Wound Care: Please cleanse R foot wound with saline, apply santyl and redress with 4x4 gauze, ABD pad and yasir daily.   Weight bearing: Please weight bear as tolerated in a surgical shoe to R foot with weight to heel   Antibiotics: Please continue as instructed.

## 2021-01-08 NOTE — DIETITIAN INITIAL EVALUATION ADULT. - ORAL INTAKE PTA/DIET HISTORY
Pt stated PO intake and appetite was good PTA. Diet recall obtained- pts eats oatmeal, toast, hard boiled egg and tea for breakfast. At lunch pt eats Chicken and vegetables for dinner grilled chicken and salad. Pt reported she lives with her son and states she is carb conscious and follows a low sodium diet. Most of meals are obtained via senior meal delivery service, sometimes her sons cook. . NKFA. Pt takes Vitamin D supplement PTA. Pt stated PO intake and appetite was good PTA. Diet recall obtained- . Pt reported she lives with her son and states she is carb conscious and follows a low sodium diet. Most of meals are obtained via senior meal delivery service, sometimes her sons cook. . NKFA. Pt takes Vitamin D supplement PTA. Pt stated PO intake and appetite was good PTA. Pt reported she lives with her son and states she is carb conscious and follows a low sodium diet. Most of meals are obtained via senior meal delivery service, sometimes her sons cook. NKFA. Pt takes Vitamin D supplement PTA.

## 2021-01-08 NOTE — DIETITIAN INITIAL EVALUATION ADULT. - LITERATURE/VIDEOS GIVEN
Consistent carbohydrate nutrition therapy and Hypertension nutrition therapy Consistent carbohydrate nutrition therapy

## 2021-01-08 NOTE — CHART NOTE - NSCHARTNOTESELECT_GEN_ALL_CORE
Pt not in hospital bed, RN called for hospital bed, informed no more beds in hospital.     Narda Fernando RN  01/10/18 0918    
Wound Care Team Note:/Event Note
I-Stop/Event Note

## 2021-01-08 NOTE — DIETITIAN INITIAL EVALUATION ADULT. - ADD RECOMMEND
1) Recommend Edward x1 daily for optimize protein intake. 2) Continue with current DASH/TLC sodium and cholesterol restriction diet 3) Monitor PO intake, weight, labs, skin, GI status, diet 1) Recommend Edward x1 daily for optimize protein intake and wound healing. 2) Continue with current DASH/TLC sodium and cholesterol restriction diet 3) Monitor PO intake, weight, labs, skin, GI status, diet

## 2021-01-09 LAB
-  AMPICILLIN/SULBACTAM: SIGNIFICANT CHANGE UP
-  CEFAZOLIN: SIGNIFICANT CHANGE UP
-  CLINDAMYCIN: SIGNIFICANT CHANGE UP
-  ERYTHROMYCIN: SIGNIFICANT CHANGE UP
-  GENTAMICIN: SIGNIFICANT CHANGE UP
-  OXACILLIN: SIGNIFICANT CHANGE UP
-  PENICILLIN: SIGNIFICANT CHANGE UP
-  RIFAMPIN: SIGNIFICANT CHANGE UP
-  TETRACYCLINE: SIGNIFICANT CHANGE UP
-  TRIMETHOPRIM/SULFAMETHOXAZOLE: SIGNIFICANT CHANGE UP
-  VANCOMYCIN: SIGNIFICANT CHANGE UP
ANION GAP SERPL CALC-SCNC: 13 MMOL/L — SIGNIFICANT CHANGE UP (ref 5–17)
BUN SERPL-MCNC: 29 MG/DL — HIGH (ref 7–23)
CALCIUM SERPL-MCNC: 9.8 MG/DL — SIGNIFICANT CHANGE UP (ref 8.4–10.5)
CHLORIDE SERPL-SCNC: 97 MMOL/L — SIGNIFICANT CHANGE UP (ref 96–108)
CO2 SERPL-SCNC: 28 MMOL/L — SIGNIFICANT CHANGE UP (ref 22–31)
CREAT SERPL-MCNC: 1.83 MG/DL — HIGH (ref 0.5–1.3)
GLUCOSE BLDC GLUCOMTR-MCNC: 137 MG/DL — HIGH (ref 70–99)
GLUCOSE BLDC GLUCOMTR-MCNC: 201 MG/DL — HIGH (ref 70–99)
GLUCOSE BLDC GLUCOMTR-MCNC: 202 MG/DL — HIGH (ref 70–99)
GLUCOSE BLDC GLUCOMTR-MCNC: 85 MG/DL — SIGNIFICANT CHANGE UP (ref 70–99)
GLUCOSE SERPL-MCNC: 80 MG/DL — SIGNIFICANT CHANGE UP (ref 70–99)
HCT VFR BLD CALC: 42.1 % — SIGNIFICANT CHANGE UP (ref 34.5–45)
HGB BLD-MCNC: 13 G/DL — SIGNIFICANT CHANGE UP (ref 11.5–15.5)
MAGNESIUM SERPL-MCNC: 2 MG/DL — SIGNIFICANT CHANGE UP (ref 1.6–2.6)
MCHC RBC-ENTMCNC: 29.5 PG — SIGNIFICANT CHANGE UP (ref 27–34)
MCHC RBC-ENTMCNC: 30.9 GM/DL — LOW (ref 32–36)
MCV RBC AUTO: 95.5 FL — SIGNIFICANT CHANGE UP (ref 80–100)
METHOD TYPE: SIGNIFICANT CHANGE UP
NRBC # BLD: 0 /100 WBCS — SIGNIFICANT CHANGE UP (ref 0–0)
PHOSPHATE SERPL-MCNC: 4 MG/DL — SIGNIFICANT CHANGE UP (ref 2.5–4.5)
PLATELET # BLD AUTO: 160 K/UL — SIGNIFICANT CHANGE UP (ref 150–400)
POTASSIUM SERPL-MCNC: 3.7 MMOL/L — SIGNIFICANT CHANGE UP (ref 3.5–5.3)
POTASSIUM SERPL-SCNC: 3.7 MMOL/L — SIGNIFICANT CHANGE UP (ref 3.5–5.3)
RBC # BLD: 4.41 M/UL — SIGNIFICANT CHANGE UP (ref 3.8–5.2)
RBC # FLD: 13.5 % — SIGNIFICANT CHANGE UP (ref 10.3–14.5)
SODIUM SERPL-SCNC: 138 MMOL/L — SIGNIFICANT CHANGE UP (ref 135–145)
WBC # BLD: 8.34 K/UL — SIGNIFICANT CHANGE UP (ref 3.8–10.5)
WBC # FLD AUTO: 8.34 K/UL — SIGNIFICANT CHANGE UP (ref 3.8–10.5)

## 2021-01-09 PROCEDURE — 99233 SBSQ HOSP IP/OBS HIGH 50: CPT | Mod: GC

## 2021-01-09 RX ADMIN — Medication 2000 UNIT(S): at 11:48

## 2021-01-09 RX ADMIN — Medication 81 MILLIGRAM(S): at 11:48

## 2021-01-09 RX ADMIN — Medication 4: at 12:15

## 2021-01-09 RX ADMIN — OXYCODONE HYDROCHLORIDE 80 MILLIGRAM(S): 5 TABLET ORAL at 20:40

## 2021-01-09 RX ADMIN — Medication 1 APPLICATION(S): at 11:49

## 2021-01-09 RX ADMIN — INSULIN GLARGINE 80 UNIT(S): 100 INJECTION, SOLUTION SUBCUTANEOUS at 21:58

## 2021-01-09 RX ADMIN — PIPERACILLIN AND TAZOBACTAM 25 GRAM(S): 4; .5 INJECTION, POWDER, LYOPHILIZED, FOR SOLUTION INTRAVENOUS at 20:40

## 2021-01-09 RX ADMIN — ATORVASTATIN CALCIUM 80 MILLIGRAM(S): 80 TABLET, FILM COATED ORAL at 20:40

## 2021-01-09 RX ADMIN — OXYCODONE HYDROCHLORIDE 80 MILLIGRAM(S): 5 TABLET ORAL at 10:06

## 2021-01-09 RX ADMIN — Medication 15 UNIT(S): at 12:14

## 2021-01-09 RX ADMIN — OXYCODONE HYDROCHLORIDE 5 MILLIGRAM(S): 5 TABLET ORAL at 05:36

## 2021-01-09 RX ADMIN — Medication 40 MILLIGRAM(S): at 05:35

## 2021-01-09 RX ADMIN — PIPERACILLIN AND TAZOBACTAM 25 GRAM(S): 4; .5 INJECTION, POWDER, LYOPHILIZED, FOR SOLUTION INTRAVENOUS at 14:38

## 2021-01-09 RX ADMIN — Medication 1 APPLICATION(S): at 11:48

## 2021-01-09 RX ADMIN — OXYCODONE HYDROCHLORIDE 5 MILLIGRAM(S): 5 TABLET ORAL at 14:50

## 2021-01-09 RX ADMIN — BUDESONIDE AND FORMOTEROL FUMARATE DIHYDRATE 2 PUFF(S): 160; 4.5 AEROSOL RESPIRATORY (INHALATION) at 11:49

## 2021-01-09 RX ADMIN — PIPERACILLIN AND TAZOBACTAM 25 GRAM(S): 4; .5 INJECTION, POWDER, LYOPHILIZED, FOR SOLUTION INTRAVENOUS at 05:39

## 2021-01-09 RX ADMIN — Medication 0.5 MILLIGRAM(S): at 20:39

## 2021-01-09 RX ADMIN — INSULIN GLARGINE 80 UNIT(S): 100 INJECTION, SOLUTION SUBCUTANEOUS at 08:14

## 2021-01-09 RX ADMIN — ENOXAPARIN SODIUM 40 MILLIGRAM(S): 100 INJECTION SUBCUTANEOUS at 05:35

## 2021-01-09 RX ADMIN — Medication 15 UNIT(S): at 17:57

## 2021-01-09 RX ADMIN — POLYETHYLENE GLYCOL 3350 17 GRAM(S): 17 POWDER, FOR SOLUTION ORAL at 05:35

## 2021-01-09 RX ADMIN — ENOXAPARIN SODIUM 40 MILLIGRAM(S): 100 INJECTION SUBCUTANEOUS at 17:57

## 2021-01-09 RX ADMIN — OXYCODONE HYDROCHLORIDE 5 MILLIGRAM(S): 5 TABLET ORAL at 21:58

## 2021-01-09 NOTE — PROGRESS NOTE ADULT - PROBLEM SELECTOR PLAN 3
Last Hgba1c - 10.4 in 11/20  - At home on Levemir 80 units BID, and Novolog 15 units before meals  - while inpatient will start on 80 Lantus BID, and 15 units Novolog before meals

## 2021-01-09 NOTE — PROGRESS NOTE ADULT - PROBLEM SELECTOR PLAN 1
Pt with recurrent right foot infections recently admitted in November for cellulitis / osteomyelitis, now presenting with warmth, erythema, and tenderness of right foot concerning for cellulitis r/o osteomyelitis  -s/p Zosyn and Vancomycin in the ED  -will continue on Zosyn (1/6- ) Day 3 and s/p Vancomycin (1/6)  -f/u blood cultures  -X-ray of right foot, concerning for osteomyelitis  -MRI of right foot w/wo contrast unable to obtain due to BMI and pain  -Podiatry following, appreciate recs  -ID following, appreciate recs

## 2021-01-09 NOTE — PROGRESS NOTE ADULT - SUBJECTIVE AND OBJECTIVE BOX
Del Jones MD PGY3  Pager: [phone] MELO, [phone] [facility]  After 7: Night Float pager    Patient is a 65y old  Female who presents with a chief complaint of Right foot wound (08 Jan 2021 13:49)      SUBJECTIVE / OVERNIGHT EVENTS: Overnight, no acute events. Patient seen and examined at bedside.     MEDICATIONS  (STANDING):  acetaminophen   Tablet .. 650 milliGRAM(s) Oral once  AQUAPHOR (petrolatum Ointment) 1 Application(s) Topical daily  aspirin enteric coated 81 milliGRAM(s) Oral daily  atorvastatin 80 milliGRAM(s) Oral at bedtime  budesonide  80 MICROgram(s)/formoterol 4.5 MICROgram(s) Inhaler 2 Puff(s) Inhalation daily  cholecalciferol 2000 Unit(s) Oral daily  collagenase Ointment 1 Application(s) Topical daily  dextrose 40% Gel 15 Gram(s) Oral once  dextrose 5%. 1000 milliLiter(s) (50 mL/Hr) IV Continuous <Continuous>  dextrose 5%. 1000 milliLiter(s) (100 mL/Hr) IV Continuous <Continuous>  dextrose 50% Injectable 25 Gram(s) IV Push once  dextrose 50% Injectable 12.5 Gram(s) IV Push once  dextrose 50% Injectable 25 Gram(s) IV Push once  enoxaparin Injectable 40 milliGRAM(s) SubCutaneous two times a day  furosemide    Tablet 40 milliGRAM(s) Oral daily  glucagon  Injectable 1 milliGRAM(s) IntraMuscular once  insulin glargine Injectable (LANTUS) 80 Unit(s) SubCutaneous two times a day  insulin lispro (ADMELOG) corrective regimen sliding scale   SubCutaneous three times a day before meals  insulin lispro (ADMELOG) corrective regimen sliding scale   SubCutaneous at bedtime  insulin lispro Injectable (ADMELOG) 15 Unit(s) SubCutaneous three times a day before meals  oxyCODONE  ER Tablet 80 milliGRAM(s) Oral every 12 hours  piperacillin/tazobactam IVPB.. 3.375 Gram(s) IV Intermittent every 8 hours  polyethylene glycol 3350 17 Gram(s) Oral two times a day  senna 2 Tablet(s) Oral at bedtime    MEDICATIONS  (PRN):  ALBUTerol    90 MICROgram(s) HFA Inhaler 2 Puff(s) Inhalation every 6 hours PRN Shortness of Breath  clonazePAM  Tablet 0.5 milliGRAM(s) Oral at bedtime PRN Insomnia  ondansetron    Tablet 4 milliGRAM(s) Oral daily PRN Nausea and/or Vomiting  oxyCODONE    IR 5 milliGRAM(s) Oral every 6 hours PRN breakthrough pain      Vital Signs Last 24 Hrs  T(C): 36.5 (09 Jan 2021 04:46), Max: 37.1 (08 Jan 2021 08:55)  T(F): 97.7 (09 Jan 2021 04:46), Max: 98.8 (08 Jan 2021 08:55)  HR: 70 (09 Jan 2021 04:46) (65 - 80)  BP: 129/69 (09 Jan 2021 04:46) (102/65 - 144/69)  BP(mean): --  RR: 18 (09 Jan 2021 04:46) (18 - 18)  SpO2: 96% (09 Jan 2021 04:46) (92% - 98%)  CAPILLARY BLOOD GLUCOSE      POCT Blood Glucose.: 132 mg/dL (08 Jan 2021 22:04)  POCT Blood Glucose.: 132 mg/dL (08 Jan 2021 17:26)  POCT Blood Glucose.: 161 mg/dL (08 Jan 2021 12:24)  POCT Blood Glucose.: 137 mg/dL (08 Jan 2021 08:27)    I&O's Summary    08 Jan 2021 07:01  -  09 Jan 2021 07:00  --------------------------------------------------------  IN: 600 mL / OUT: 2000 mL / NET: -1400 mL        PHYSICAL EXAM:  GENERAL: NAD, well-developed  EYES: EOMI, PERRLA, conjunctiva and sclera clear  NECK:  No JVD  CHEST/LUNG: CTABL ; No wheeze  HEART: RRR; No murmurs  ABDOMEN: Soft, Nontender, Nondistended; Bowel sounds present  : No George  EXTREMITIES:  2+ Peripheral Pulses, No edema  PSYCH: AAOx3  NEUROLOGY: non-focal  SKIN: No rashes or lesions. No sacral ulcer    LABS:                        12.0   8.54  )-----------( 170      ( 08 Jan 2021 06:54 )             39.2     01-08    137  |  99  |  29<H>  ----------------------------<  127<H>  4.0   |  26  |  1.77<H>    Ca    9.3      08 Jan 2021 06:54  Phos  3.1     01-08  Mg     2.0     01-08                Microbiology;        RADIOLOGY & ADDITIONAL TESTS:    Imaging Personally Reviewed:          Consultant(s) Notes Reviewed: ID, podiatry    Care Discussed with Consultants/Other Providers: ID, podiatry

## 2021-01-09 NOTE — PROGRESS NOTE ADULT - PROBLEM SELECTOR PLAN 2
Pt has a chronic history of cervical and lumbar spinal pain.  - On 80mg oxycodone BID at home and hydromorphone-acetaminophen 7.25mg/325mg TID PRN at home (will need to bring from home if patient needs)  - oxycodone 5mg q 6hrs PRN for breakthrough pain while inpatient   - will continue home medications while inpatient for pain management

## 2021-01-09 NOTE — PROGRESS NOTE ADULT - PROBLEM SELECTOR PLAN 6
Pt with recently diagnosed asthma, on Advair HFA qd and Albuterol PRN at home  - continue with symbicort and albuterol while inpatient

## 2021-01-10 ENCOUNTER — TRANSCRIPTION ENCOUNTER (OUTPATIENT)
Age: 66
End: 2021-01-10

## 2021-01-10 VITALS
TEMPERATURE: 99 F | DIASTOLIC BLOOD PRESSURE: 77 MMHG | HEART RATE: 74 BPM | OXYGEN SATURATION: 98 % | RESPIRATION RATE: 18 BRPM | SYSTOLIC BLOOD PRESSURE: 150 MMHG

## 2021-01-10 LAB
ANION GAP SERPL CALC-SCNC: 12 MMOL/L — SIGNIFICANT CHANGE UP (ref 5–17)
BUN SERPL-MCNC: 30 MG/DL — HIGH (ref 7–23)
CALCIUM SERPL-MCNC: 9.6 MG/DL — SIGNIFICANT CHANGE UP (ref 8.4–10.5)
CHLORIDE SERPL-SCNC: 101 MMOL/L — SIGNIFICANT CHANGE UP (ref 96–108)
CO2 SERPL-SCNC: 29 MMOL/L — SIGNIFICANT CHANGE UP (ref 22–31)
CREAT SERPL-MCNC: 1.9 MG/DL — HIGH (ref 0.5–1.3)
GLUCOSE BLDC GLUCOMTR-MCNC: 161 MG/DL — HIGH (ref 70–99)
GLUCOSE BLDC GLUCOMTR-MCNC: 89 MG/DL — SIGNIFICANT CHANGE UP (ref 70–99)
GLUCOSE SERPL-MCNC: 110 MG/DL — HIGH (ref 70–99)
HCT VFR BLD CALC: 45.6 % — HIGH (ref 34.5–45)
HGB BLD-MCNC: 14 G/DL — SIGNIFICANT CHANGE UP (ref 11.5–15.5)
MAGNESIUM SERPL-MCNC: 2 MG/DL — SIGNIFICANT CHANGE UP (ref 1.6–2.6)
MCHC RBC-ENTMCNC: 29.7 PG — SIGNIFICANT CHANGE UP (ref 27–34)
MCHC RBC-ENTMCNC: 30.7 GM/DL — LOW (ref 32–36)
MCV RBC AUTO: 96.6 FL — SIGNIFICANT CHANGE UP (ref 80–100)
NRBC # BLD: 0 /100 WBCS — SIGNIFICANT CHANGE UP (ref 0–0)
PHOSPHATE SERPL-MCNC: 4.2 MG/DL — SIGNIFICANT CHANGE UP (ref 2.5–4.5)
PLATELET # BLD AUTO: 200 K/UL — SIGNIFICANT CHANGE UP (ref 150–400)
POTASSIUM SERPL-MCNC: 4 MMOL/L — SIGNIFICANT CHANGE UP (ref 3.5–5.3)
POTASSIUM SERPL-SCNC: 4 MMOL/L — SIGNIFICANT CHANGE UP (ref 3.5–5.3)
RBC # BLD: 4.72 M/UL — SIGNIFICANT CHANGE UP (ref 3.8–5.2)
RBC # FLD: 13.3 % — SIGNIFICANT CHANGE UP (ref 10.3–14.5)
SODIUM SERPL-SCNC: 142 MMOL/L — SIGNIFICANT CHANGE UP (ref 135–145)
WBC # BLD: 9.87 K/UL — SIGNIFICANT CHANGE UP (ref 3.8–10.5)
WBC # FLD AUTO: 9.87 K/UL — SIGNIFICANT CHANGE UP (ref 3.8–10.5)

## 2021-01-10 PROCEDURE — 99239 HOSP IP/OBS DSCHRG MGMT >30: CPT | Mod: GC

## 2021-01-10 RX ADMIN — OXYCODONE HYDROCHLORIDE 80 MILLIGRAM(S): 5 TABLET ORAL at 08:19

## 2021-01-10 RX ADMIN — PIPERACILLIN AND TAZOBACTAM 25 GRAM(S): 4; .5 INJECTION, POWDER, LYOPHILIZED, FOR SOLUTION INTRAVENOUS at 15:45

## 2021-01-10 RX ADMIN — BUDESONIDE AND FORMOTEROL FUMARATE DIHYDRATE 2 PUFF(S): 160; 4.5 AEROSOL RESPIRATORY (INHALATION) at 12:11

## 2021-01-10 RX ADMIN — POLYETHYLENE GLYCOL 3350 17 GRAM(S): 17 POWDER, FOR SOLUTION ORAL at 04:26

## 2021-01-10 RX ADMIN — Medication 2000 UNIT(S): at 12:12

## 2021-01-10 RX ADMIN — Medication 15 UNIT(S): at 12:13

## 2021-01-10 RX ADMIN — OXYCODONE HYDROCHLORIDE 5 MILLIGRAM(S): 5 TABLET ORAL at 10:28

## 2021-01-10 RX ADMIN — INSULIN GLARGINE 80 UNIT(S): 100 INJECTION, SOLUTION SUBCUTANEOUS at 08:50

## 2021-01-10 RX ADMIN — OXYCODONE HYDROCHLORIDE 5 MILLIGRAM(S): 5 TABLET ORAL at 04:26

## 2021-01-10 RX ADMIN — PIPERACILLIN AND TAZOBACTAM 25 GRAM(S): 4; .5 INJECTION, POWDER, LYOPHILIZED, FOR SOLUTION INTRAVENOUS at 04:27

## 2021-01-10 RX ADMIN — Medication 1 APPLICATION(S): at 12:11

## 2021-01-10 RX ADMIN — Medication 1 APPLICATION(S): at 12:12

## 2021-01-10 RX ADMIN — Medication 81 MILLIGRAM(S): at 12:12

## 2021-01-10 RX ADMIN — Medication 40 MILLIGRAM(S): at 04:26

## 2021-01-10 RX ADMIN — ENOXAPARIN SODIUM 40 MILLIGRAM(S): 100 INJECTION SUBCUTANEOUS at 04:26

## 2021-01-10 RX ADMIN — OXYCODONE HYDROCHLORIDE 5 MILLIGRAM(S): 5 TABLET ORAL at 16:08

## 2021-01-10 RX ADMIN — Medication 2: at 12:13

## 2021-01-10 NOTE — PROGRESS NOTE ADULT - PROBLEM SELECTOR PLAN 7
DVT: Lovenox 40mg BID   Diet: CC, DASH   Dispo: to home mostly likely with IV abx DVT: Lovenox 40mg BID   Diet: CC, DASH   Dispo: to home with oral abx

## 2021-01-10 NOTE — PROGRESS NOTE ADULT - PROBLEM SELECTOR PLAN 1
Pt with recurrent right foot infections recently admitted in November for cellulitis / osteomyelitis, now presenting with warmth, erythema, and tenderness of right foot concerning for cellulitis r/o osteomyelitis  -s/p Zosyn and Vancomycin in the ED  -will continue on Zosyn (1/6- ) Day 3 and s/p Vancomycin (1/6)  -f/u blood cultures  -X-ray of right foot, concerning for osteomyelitis  -MRI of right foot w/wo contrast unable to obtain due to BMI and pain  -Podiatry following, appreciate recs  -ID following, appreciate recs Pt with recurrent right foot infections recently admitted in November for cellulitis / osteomyelitis, now presenting with warmth, erythema, and tenderness of right foot concerning for cellulitis r/o osteomyelitis  -s/p Zosyn and Vancomycin in the ED  -will continue on Zosyn (1/6-1/10 ) Day 5 and s/p Vancomycin (1/6)  -blood cultures from 1/6 - NGTD  -X-ray of right foot, concerning for osteomyelitis  -MRI of right foot w/wo contrast unable to obtain due to BMI and pain  -Podiatry following, appreciate recs  -ID following, appreciate recs

## 2021-01-10 NOTE — PROGRESS NOTE ADULT - SUBJECTIVE AND OBJECTIVE BOX
Lexus Arredondo MD   Internal Medicine PGY-1  Pager: NS: [phone] [facility]: [phone]    INCOMPLETE NOTE  Lexus Arredondo MD   Internal Medicine PGY-1  Pager: NS: [phone] [facility]: [phone]    Patient is a 65y old  Female who presents with a chief complaint of Right foot wound (10 Darron 2021 06:36)    SUBJECTIVE / OVERNIGHT EVENTS:  No acute events overnight. Pt right foot improving. ID okay with discharging on 10 day course of Keflex. Tolerating diet. She denies any fevers, chills, n/v, abdominal pain, chest pain, SOB, dysuria. No other complaints.     MEDICATIONS  (STANDING):  acetaminophen   Tablet .. 650 milliGRAM(s) Oral once  AQUAPHOR (petrolatum Ointment) 1 Application(s) Topical daily  aspirin enteric coated 81 milliGRAM(s) Oral daily  atorvastatin 80 milliGRAM(s) Oral at bedtime  budesonide  80 MICROgram(s)/formoterol 4.5 MICROgram(s) Inhaler 2 Puff(s) Inhalation daily  cholecalciferol 2000 Unit(s) Oral daily  collagenase Ointment 1 Application(s) Topical daily  dextrose 40% Gel 15 Gram(s) Oral once  dextrose 5%. 1000 milliLiter(s) (50 mL/Hr) IV Continuous <Continuous>  dextrose 5%. 1000 milliLiter(s) (100 mL/Hr) IV Continuous <Continuous>  dextrose 50% Injectable 25 Gram(s) IV Push once  dextrose 50% Injectable 12.5 Gram(s) IV Push once  dextrose 50% Injectable 25 Gram(s) IV Push once  enoxaparin Injectable 40 milliGRAM(s) SubCutaneous two times a day  furosemide    Tablet 40 milliGRAM(s) Oral daily  glucagon  Injectable 1 milliGRAM(s) IntraMuscular once  insulin glargine Injectable (LANTUS) 80 Unit(s) SubCutaneous two times a day  insulin lispro (ADMELOG) corrective regimen sliding scale   SubCutaneous three times a day before meals  insulin lispro (ADMELOG) corrective regimen sliding scale   SubCutaneous at bedtime  insulin lispro Injectable (ADMELOG) 15 Unit(s) SubCutaneous three times a day before meals  oxyCODONE  ER Tablet 80 milliGRAM(s) Oral every 12 hours  piperacillin/tazobactam IVPB.. 3.375 Gram(s) IV Intermittent every 8 hours  polyethylene glycol 3350 17 Gram(s) Oral two times a day  senna 2 Tablet(s) Oral at bedtime    MEDICATIONS  (PRN):  ALBUTerol    90 MICROgram(s) HFA Inhaler 2 Puff(s) Inhalation every 6 hours PRN Shortness of Breath  clonazePAM  Tablet 0.5 milliGRAM(s) Oral at bedtime PRN Insomnia  ondansetron    Tablet 4 milliGRAM(s) Oral daily PRN Nausea and/or Vomiting  oxyCODONE    IR 5 milliGRAM(s) Oral every 6 hours PRN breakthrough pain    T(C): 36.5 (01-10-21 @ 08:13), Max: 36.6 (01-10-21 @ 04:35)  HR: 62 (01-10-21 @ 08:13) (62 - 70)  BP: 124/57 (01-10-21 @ 08:13) (115/67 - 142/67)  RR: 18 (01-10-21 @ 08:13) (18 - 19)  SpO2: 97% (01-10-21 @ 08:13) (95% - 98%)    CAPILLARY BLOOD GLUCOSE  POCT Blood Glucose.: 89 mg/dL (10 Darron 2021 08:10)  POCT Blood Glucose.: 202 mg/dL (09 Jan 2021 21:52)  POCT Blood Glucose.: 137 mg/dL (09 Jan 2021 17:07)  POCT Blood Glucose.: 201 mg/dL (09 Jan 2021 12:04)    I&O's Summary    09 Jan 2021 07:01  -  10 Darron 2021 07:00  --------------------------------------------------------  IN: 1220 mL / OUT: 2750 mL / NET: -1530 mL    PHYSICAL EXAM:  GENERAL: NAD, well-developed  HEAD: Atraumatic, Normocephalic  EYES: EOMI, conjunctiva and sclera clear  NECK: Supple, No JVD  CHEST/LUNG: Clear to auscultation bilaterally; No wheeze or crackles, distant lung sounds  HEART: Regular rate and rhythm; No murmurs, rubs, or gallops, distant heart sounds  ABDOMEN: Soft, Nontender, Nondistended; Bowel sounds present  EXTREMITIES:  2+ Peripheral Pulses, No clubbing, cyanosis, or edema  PSYCH: AAOx3  NEUROLOGY: non-focal  SKIN: right foot erythema, swelling, and warmth, ulcer at the bottom of the right foot, right foot dressing c/d/i    LABS:                        14.0   9.87  )-----------( 200      ( 10 Darron 2021 06:06 )             45.6     01-10    142  |  101  |  30<H>  ----------------------------<  110<H>  4.0   |  29  |  1.90<H>    Ca    9.6      10 Darron 2021 06:06  Phos  4.2     01-10  Mg     2.0     01-10    RADIOLOGY & ADDITIONAL TESTS:    Imaging Personally Reviewed: Reviewed     Consultant(s) Notes Reviewed: ID and Podiatry     Care Discussed with Consultants/Other Providers: ID and Podiatry   Lexus Arredondo MD   Internal Medicine PGY-1  Pager: NS: [phone] [facility]: [phone]    Patient is a 65y old  Female who presents with a chief complaint of Right foot wound (10 Darron 2021 06:36)    SUBJECTIVE / OVERNIGHT EVENTS:  No acute events overnight. Pt right foot improving. ID okay with discharging on Augmentin. Tolerating diet. She denies any fevers, chills, n/v, abdominal pain, chest pain, SOB, dysuria. No other complaints.     MEDICATIONS  (STANDING):  acetaminophen   Tablet .. 650 milliGRAM(s) Oral once  AQUAPHOR (petrolatum Ointment) 1 Application(s) Topical daily  aspirin enteric coated 81 milliGRAM(s) Oral daily  atorvastatin 80 milliGRAM(s) Oral at bedtime  budesonide  80 MICROgram(s)/formoterol 4.5 MICROgram(s) Inhaler 2 Puff(s) Inhalation daily  cholecalciferol 2000 Unit(s) Oral daily  collagenase Ointment 1 Application(s) Topical daily  dextrose 40% Gel 15 Gram(s) Oral once  dextrose 5%. 1000 milliLiter(s) (50 mL/Hr) IV Continuous <Continuous>  dextrose 5%. 1000 milliLiter(s) (100 mL/Hr) IV Continuous <Continuous>  dextrose 50% Injectable 25 Gram(s) IV Push once  dextrose 50% Injectable 12.5 Gram(s) IV Push once  dextrose 50% Injectable 25 Gram(s) IV Push once  enoxaparin Injectable 40 milliGRAM(s) SubCutaneous two times a day  furosemide    Tablet 40 milliGRAM(s) Oral daily  glucagon  Injectable 1 milliGRAM(s) IntraMuscular once  insulin glargine Injectable (LANTUS) 80 Unit(s) SubCutaneous two times a day  insulin lispro (ADMELOG) corrective regimen sliding scale   SubCutaneous three times a day before meals  insulin lispro (ADMELOG) corrective regimen sliding scale   SubCutaneous at bedtime  insulin lispro Injectable (ADMELOG) 15 Unit(s) SubCutaneous three times a day before meals  oxyCODONE  ER Tablet 80 milliGRAM(s) Oral every 12 hours  piperacillin/tazobactam IVPB.. 3.375 Gram(s) IV Intermittent every 8 hours  polyethylene glycol 3350 17 Gram(s) Oral two times a day  senna 2 Tablet(s) Oral at bedtime    MEDICATIONS  (PRN):  ALBUTerol    90 MICROgram(s) HFA Inhaler 2 Puff(s) Inhalation every 6 hours PRN Shortness of Breath  clonazePAM  Tablet 0.5 milliGRAM(s) Oral at bedtime PRN Insomnia  ondansetron    Tablet 4 milliGRAM(s) Oral daily PRN Nausea and/or Vomiting  oxyCODONE    IR 5 milliGRAM(s) Oral every 6 hours PRN breakthrough pain    T(C): 36.5 (01-10-21 @ 08:13), Max: 36.6 (01-10-21 @ 04:35)  HR: 62 (01-10-21 @ 08:13) (62 - 70)  BP: 124/57 (01-10-21 @ 08:13) (115/67 - 142/67)  RR: 18 (01-10-21 @ 08:13) (18 - 19)  SpO2: 97% (01-10-21 @ 08:13) (95% - 98%)    CAPILLARY BLOOD GLUCOSE  POCT Blood Glucose.: 89 mg/dL (10 Darron 2021 08:10)  POCT Blood Glucose.: 202 mg/dL (09 Jan 2021 21:52)  POCT Blood Glucose.: 137 mg/dL (09 Jan 2021 17:07)  POCT Blood Glucose.: 201 mg/dL (09 Jan 2021 12:04)    I&O's Summary    09 Jan 2021 07:01  -  10 Darron 2021 07:00  --------------------------------------------------------  IN: 1220 mL / OUT: 2750 mL / NET: -1530 mL    PHYSICAL EXAM:  GENERAL: NAD, well-developed  HEAD: Atraumatic, Normocephalic  EYES: EOMI, conjunctiva and sclera clear  NECK: Supple, No JVD  CHEST/LUNG: Clear to auscultation bilaterally; No wheeze or crackles, distant lung sounds  HEART: Regular rate and rhythm; No murmurs, rubs, or gallops, distant heart sounds  ABDOMEN: Soft, Nontender, Nondistended; Bowel sounds present  EXTREMITIES:  2+ Peripheral Pulses, No clubbing, cyanosis, or edema  PSYCH: AAOx3  NEUROLOGY: non-focal  SKIN: right foot erythema, swelling, and warmth, ulcer at the bottom of the right foot, right foot dressing c/d/i (improving from admission)    LABS:                        14.0   9.87  )-----------( 200      ( 10 Darron 2021 06:06 )             45.6     01-10    142  |  101  |  30<H>  ----------------------------<  110<H>  4.0   |  29  |  1.90<H>    Ca    9.6      10 Darron 2021 06:06  Phos  4.2     01-10  Mg     2.0     01-10    RADIOLOGY & ADDITIONAL TESTS:    Imaging Personally Reviewed: Reviewed     Consultant(s) Notes Reviewed: ID and Podiatry     Care Discussed with Consultants/Other Providers: ID and Podiatry

## 2021-01-10 NOTE — PROGRESS NOTE ADULT - ASSESSMENT
Patient is a 65 year old woman with PMH morbid obesity, asthma, CHF, pulmonary HTN, home on 3L NC, T2DM, CKD, HTN, diabetic neuropathy, Charcot foot, chronic venous statis and chronic right foot wound, she presents today with worsening erythema, edema, warmth of the right foot concerning for cellulitis and underlying osteomyelitis. 
65F w/ R foot plantar midfoot wound    - pt seen and evaluated  - is well known to podiatric service with previous admissions in July & November & during her last admission a bone biopsy was obtained which was negative & Infectious Disease attending Dr. Lockhart recommend discharging off antibiotics & to manage as soft tissue infection  - local wound care with Santyl and DSD  - unable to obtain R foot MR 2/2 body habitus   - no acute pod sx intervention or bone biopsy  - recommend tx with long term IV abx via PICC based of recent WCx  - pod stable for discharge pending final ID recommendations   - seen attending
65 year old woman with PMH morbid obesity, asthma, CHF, pulmonary HTN, home on 3L NC, T2DM, CKD, HTN, diabetic neuropathy, Charcot foot, chronic venous statis and chronic right foot wound, she presents today with worsening erythema, edema, warmth of the right foot. In the ED afebrile, normotensive and not tachycardic. WBC on presentation of 10.06 with 70.3% PMN. ESR (1/7) 70 and CRP (1/6) 1.38.     U/A with 0 WBC.   COVID19 PCR (1/6) negative.   XR of right foot (1/6) with Shallow ulceration at the plantar midfoot but No underlying cortical erosion at the base of the fifth metatarsal    Could not fit into MRI scanner  RLE foot with erythema of sole but this is also present on left foot - unclear if secondary to venous stasis  Per podiatry no role for bone biopsy at this point  Doubt role for tagged WBC scan as it was indeterminate previously    As ulcer is shallow without probe past subQ and no radiographic evidence of OM on XR (and previous bone biopsy from site negative) would treat for skin and soft tissue infection  Risks for empiric OM coverage outweigh benefits at this point  So far cultures debridement cultures with Staph aureus (previous cultures with MSSA and Finegoldia)  If no growth of further organisms over the next 24-48H anticipate discharge on Keflex to complete 10 day total antibiotic course  Would continue Zosyn for now pending prelim cultures     Overall, Foot Wound, Elevated ESR, Elevated CRP, Positive Culture findings     --Continue Zosyn (tentative discharge plan as above)  --Continue to follow CBC with diff  --Continue to follow temperature curve  --Follow up on preliminary blood cultures  --Follow up on prelim ulcer debridement cultures and staph aureus susceptibilities     I will be away over this upcoming weekend. Please contact the Infectious Diseases Office with any further questions or concerns.     Reinaldo Freed M.D.  [facility] Division of Infectious Disease  8AM-5PM: Pager Number [phone]  After Hours (or if no response): Please contact the Infectious Diseases Office at [phone]     The above assessment and plan were discussed with Dr Wendy Snyder   
Patient is a 65 year old woman with PMH morbid obesity, asthma, CHF, pulmonary HTN, home on 3L NC, T2DM, CKD, HTN, diabetic neuropathy, Charcot foot, chronic venous statis and chronic right foot wound, she presents today with worsening erythema, edema, warmth of the right foot concerning for cellulitis and underlying osteomyelitis. 

## 2021-01-10 NOTE — PROGRESS NOTE ADULT - REASON FOR ADMISSION
Right foot wound
Right foot wound
Subjective:   Jeanie Hutchison is a 69 y.o. female who presents today In follow-up for paroxysmal atrial fibrillation and flecainide therapy  Depression and excess alcohol still an issue although things are somewhat better  She drinks wine to put her to sleep.  She's had some recurrent palpitations about every 7 days or so and takes an extra 200 mg of flecainide at that moment.  She is faithful in taking 100 mg of flecainide twice a day  No other issues except for she has restarted her thyroid as suggested by PCP.    Past Medical History:   Diagnosis Date   • A-fib (CMS-HCC) 5/1/2014   • Chronic pain     r/t pelvic fracture   • Psychiatric disorder     history of depression and anxiety      Past Surgical History:   Procedure Laterality Date   • CLAVICLE ORIF  5/21/2014    Performed by Wilfred Gonzalez M.D. at SURGERY [facility]   • BREAST BIOPSY      bilateral neg breast bxs   • OTHER ORTHOPEDIC SURGERY      pelvis fracture. 10 years ago      History reviewed. No pertinent family history.  History   Smoking Status   • Never Smoker   Smokeless Tobacco   • Never Used     No Known Allergies  Outpatient Encounter Prescriptions as of 10/31/2017   Medication Sig Dispense Refill   • levothyroxine (SYNTHROID) 25 MCG Tab Take 1 Tab by mouth Every morning on an empty stomach. 90 Tab 1   • flecainide (TAMBOCOR) 100 MG Tab Take 1 Tab by mouth 2 times a day. 60 Tab 11   • carvedilol (COREG) 12.5 MG Tab Take 1 Tab by mouth 2 times a day, with meals. GENERIC FOR COREG 90 Tab 1   • lorazepam (ATIVAN) 1 MG Tab Take 1 Tab by mouth every 8 hours as needed for Anxiety.     • paroxetine (PAXIL-CR) 37.5 MG CR tablet Take 37.5 mg by mouth every day.     • aspirin EC 81 MG EC tablet Take 1 Tab by mouth every day. 30 Tab 2     No facility-administered encounter medications on file as of 10/31/2017.      Review of Systems   Constitutional: Negative for chills, fever and malaise/fatigue.   Eyes: Negative for blurred vision and 
"discharge.   Respiratory: Negative for cough and shortness of breath.    Cardiovascular: Positive for palpitations. Negative for chest pain, leg swelling and PND.   Gastrointestinal: Negative for heartburn and nausea.   Genitourinary: Negative for dysuria and urgency.   Musculoskeletal: Positive for back pain. Negative for myalgias.   Skin: Negative for itching and rash.   Neurological: Negative for dizziness and headaches.   Endo/Heme/Allergies: Negative for environmental allergies. Does not bruise/bleed easily.   Psychiatric/Behavioral: Positive for depression. The patient is nervous/anxious and has insomnia.         Objective:   /84   Pulse 75   Ht 1.689 m (5' 6.5\")   Wt 78.5 kg (173 lb)   SpO2 94%   BMI 27.50 kg/m²     Physical Exam   Constitutional: She is oriented to person, place, and time. She appears well-developed and well-nourished.   HENT:   Head: Normocephalic and atraumatic.   Eyes: Conjunctivae and EOM are normal. No scleral icterus.   Neck: Neck supple. No JVD present. No thyromegaly present.   Cardiovascular: Normal rate, regular rhythm and normal heart sounds.  Exam reveals no gallop and no friction rub.    No murmur heard.  Pulmonary/Chest: Effort normal and breath sounds normal. No respiratory distress. She has no wheezes. She has no rales. She exhibits no tenderness.   Abdominal: Soft. Bowel sounds are normal. She exhibits no distension and no mass. There is no tenderness.   Neurological: She is alert and oriented to person, place, and time. Coordination normal.   Skin: Skin is warm and dry. No rash noted. No pallor.   Psychiatric: She has a normal mood and affect. Her behavior is normal. Judgment and thought content normal.       Assessment:     1. Paroxysmal atrial fibrillation (CMS-HCC)     2. Essential hypertension         Medical Decision Making:  Today's Assessment / Status / Plan:   We discussed alcohol usage  We discussed taking an extra 100 mg of flecainide and an extra "
Right foot wound
Right foot wound
carvedilol tablet if and when atrial fibrillation recurs  Return in 6 months  
Right foot wound
Right foot wound

## 2021-01-10 NOTE — PROGRESS NOTE ADULT - ATTENDING COMMENTS
Pt seen and examined. Agree with above with additional findings:    # r/o R foot OM  # pulm HTN, COPD on home O2  # morbid obesity  # uncontrolled DM II    - tried to obtain MRI last night but couldn't complete the study 2/2 body habitus  - discussed care with Dr. Freed: indeterminate nuclear scan last admission - don't see much utility of repeating the scan at this time. Will fu wound culture (growing staph), fu sensitivity and likely 7 day course for cellulitis  - appreciate podiatry recs: no acute pod sx intervention or bone biopsy  - conitnue zosyn for based one previous cultures (MSSA); pt currently not septic, hemodynamically stable  - FSS+ISS  - PT recs home PT    Wendy Snyder MD  Division of Hospital Medicine  Cell: [phone]  Office: [phone].
Pt seen and examined. Agree with above with additional findings:    # r/o R foot OM  # pulm HTN, COPD on home O2  # morbid obesity  # uncontrolled DM II    - tried to obtain MRI but couldn't complete the study 2/2 body habitus; will need to arranged as outpatient  - discussed care with Dr. Freed: indeterminate nuclear scan last admission - don't see much utility of repeating the scan at this time. Will fu wound culture (growing staph), fu sensitivity and likely 7 day course for cellulitis  - appreciate podiatry recs: no acute pod sx intervention or bone biopsy  - FSS+ISS  - PT recs home PT    dc planning with course of augmentin (total 7 day)   pt agreeable to dc planning    44 minutes spent on discharge    Wendy Snyder MD  Division of Hospital Medicine  Cell: [phone]  Office: [phone].
Pt seen and examined. Agree with above with additional findings:    # r/o R foot OM  # pulm HTN, COPD on home O2  # morbid obesity  # uncontrolled DM II    - s/p 16 days of IV abx for diabetic foot infection in 11/2020 (bone bx negative for osteo at that time), presenting with worsening erythema/edema, x-ray concerning for OM  - tried to obtain MRI last night but couldn't complete the study 2/2 body habitus  - discussed care with Dr. Freed: indeterminate nuclear scan last admission - don't see much utility of repeating the scan at this time.   - will discuss with podiatry re: bone biopsy  - conitnue zosyn for now until biopsy based one previous cultures; pt currently not septic, hemodynamically stable  - FSS+ISS  - PT recs home PT    Wendy Snyder MD  Division of Hospital Medicine  Cell: [phone]  Office: [phone].
Pt seen and examined. Agree with above with additional findings:    # r/o R foot OM  # pulm HTN, COPD on home O2  # morbid obesity  # uncontrolled DM II    - s/p 16 days of IV abx for diabetic foot infection in 11/2020 (bone bx negative for osteo at that time), presenting with worsening erythema/edema, x-ray concerning for OM  - obtain MRI foot to rule in/out OM  - hold off on vancomycin, zosyn for now until biopsy; pt currently not septic, hemodynamically stable  - consult podiatry for further management  - pt known to Dr. Lockhart (ID): ID team to see her  - FSS+ISS  - PT consult    Wendy Snyder MD  Division of Hospital Medicine  Cell: [phone]  Office: [phone].

## 2021-01-10 NOTE — DISCHARGE NOTE NURSING/CASE MANAGEMENT/SOCIAL WORK - PATIENT PORTAL LINK FT
You can access the FollowMyHealth Patient Portal offered by [facility] by registering at the following website: [email or web address]. By joining [facility]’s FollowMyHealth portal, you will also be able to view your health information using other applications (apps) compatible with our system.

## 2021-01-11 LAB
CULTURE RESULTS: SIGNIFICANT CHANGE UP
CULTURE RESULTS: SIGNIFICANT CHANGE UP
SPECIMEN SOURCE: SIGNIFICANT CHANGE UP
SPECIMEN SOURCE: SIGNIFICANT CHANGE UP

## 2021-01-12 ENCOUNTER — NON-APPOINTMENT (OUTPATIENT)
Age: 66
End: 2021-01-12

## 2021-01-12 LAB
CULTURE RESULTS: SIGNIFICANT CHANGE UP
ORGANISM # SPEC MICROSCOPIC CNT: SIGNIFICANT CHANGE UP
ORGANISM # SPEC MICROSCOPIC CNT: SIGNIFICANT CHANGE UP
SPECIMEN SOURCE: SIGNIFICANT CHANGE UP

## 2021-01-19 ENCOUNTER — APPOINTMENT (OUTPATIENT)
Dept: INTERNAL MEDICINE | Facility: CLINIC | Age: 66
End: 2021-01-19
Payer: MEDICARE

## 2021-01-19 DIAGNOSIS — Z99.81 DEPENDENCE ON SUPPLEMENTAL OXYGEN: ICD-10-CM

## 2021-01-19 DIAGNOSIS — L02.91 CELLULITIS, UNSPECIFIED: ICD-10-CM

## 2021-01-19 DIAGNOSIS — L03.90 CELLULITIS, UNSPECIFIED: ICD-10-CM

## 2021-01-19 PROCEDURE — 99442: CPT

## 2021-01-21 PROCEDURE — 82435 ASSAY OF BLOOD CHLORIDE: CPT

## 2021-01-21 PROCEDURE — 80048 BASIC METABOLIC PNL TOTAL CA: CPT

## 2021-01-21 PROCEDURE — 87070 CULTURE OTHR SPECIMN AEROBIC: CPT

## 2021-01-21 PROCEDURE — 82330 ASSAY OF CALCIUM: CPT

## 2021-01-21 PROCEDURE — 85025 COMPLETE CBC W/AUTO DIFF WBC: CPT

## 2021-01-21 PROCEDURE — 94640 AIRWAY INHALATION TREATMENT: CPT

## 2021-01-21 PROCEDURE — 87205 SMEAR GRAM STAIN: CPT

## 2021-01-21 PROCEDURE — 81001 URINALYSIS AUTO W/SCOPE: CPT

## 2021-01-21 PROCEDURE — 85027 COMPLETE CBC AUTOMATED: CPT

## 2021-01-21 PROCEDURE — 87635 SARS-COV-2 COVID-19 AMP PRB: CPT

## 2021-01-21 PROCEDURE — 83605 ASSAY OF LACTIC ACID: CPT

## 2021-01-21 PROCEDURE — 85014 HEMATOCRIT: CPT

## 2021-01-21 PROCEDURE — 84295 ASSAY OF SERUM SODIUM: CPT

## 2021-01-21 PROCEDURE — 87040 BLOOD CULTURE FOR BACTERIA: CPT

## 2021-01-21 PROCEDURE — 82947 ASSAY GLUCOSE BLOOD QUANT: CPT

## 2021-01-21 PROCEDURE — 99285 EMERGENCY DEPT VISIT HI MDM: CPT | Mod: 25

## 2021-01-21 PROCEDURE — 82803 BLOOD GASES ANY COMBINATION: CPT

## 2021-01-21 PROCEDURE — 85018 HEMOGLOBIN: CPT

## 2021-01-21 PROCEDURE — 86140 C-REACTIVE PROTEIN: CPT

## 2021-01-21 PROCEDURE — 83735 ASSAY OF MAGNESIUM: CPT

## 2021-01-21 PROCEDURE — 85730 THROMBOPLASTIN TIME PARTIAL: CPT

## 2021-01-21 PROCEDURE — 86769 SARS-COV-2 COVID-19 ANTIBODY: CPT

## 2021-01-21 PROCEDURE — 82962 GLUCOSE BLOOD TEST: CPT

## 2021-01-21 PROCEDURE — 76937 US GUIDE VASCULAR ACCESS: CPT

## 2021-01-21 PROCEDURE — 85610 PROTHROMBIN TIME: CPT

## 2021-01-21 PROCEDURE — 85652 RBC SED RATE AUTOMATED: CPT

## 2021-01-21 PROCEDURE — 86850 RBC ANTIBODY SCREEN: CPT

## 2021-01-21 PROCEDURE — 87186 SC STD MICRODIL/AGAR DIL: CPT

## 2021-01-21 PROCEDURE — 84100 ASSAY OF PHOSPHORUS: CPT

## 2021-01-21 PROCEDURE — 84132 ASSAY OF SERUM POTASSIUM: CPT

## 2021-01-21 PROCEDURE — 96374 THER/PROPH/DIAG INJ IV PUSH: CPT | Mod: XU

## 2021-01-21 PROCEDURE — 80053 COMPREHEN METABOLIC PANEL: CPT

## 2021-01-21 PROCEDURE — 93005 ELECTROCARDIOGRAM TRACING: CPT

## 2021-01-21 PROCEDURE — 86900 BLOOD TYPING SEROLOGIC ABO: CPT

## 2021-01-21 PROCEDURE — U0005: CPT

## 2021-01-21 PROCEDURE — 73620 X-RAY EXAM OF FOOT: CPT

## 2021-01-21 PROCEDURE — 97161 PT EVAL LOW COMPLEX 20 MIN: CPT

## 2021-01-21 PROCEDURE — 86901 BLOOD TYPING SEROLOGIC RH(D): CPT

## 2021-03-03 ENCOUNTER — RX RENEWAL (OUTPATIENT)
Age: 66
End: 2021-03-03

## 2021-03-11 ENCOUNTER — NON-APPOINTMENT (OUTPATIENT)
Age: 66
End: 2021-03-11

## 2021-03-11 ENCOUNTER — INPATIENT (INPATIENT)
Facility: HOSPITAL | Age: 66
LOS: 4 days | Discharge: HOME | End: 2021-03-16
Attending: STUDENT IN AN ORGANIZED HEALTH CARE EDUCATION/TRAINING PROGRAM | Admitting: STUDENT IN AN ORGANIZED HEALTH CARE EDUCATION/TRAINING PROGRAM
Payer: MEDICARE

## 2021-03-11 VITALS
SYSTOLIC BLOOD PRESSURE: 165 MMHG | OXYGEN SATURATION: 96 % | DIASTOLIC BLOOD PRESSURE: 77 MMHG | HEART RATE: 90 BPM | TEMPERATURE: 98 F | HEIGHT: 63 IN | RESPIRATION RATE: 16 BRPM

## 2021-03-11 DIAGNOSIS — E11.22 TYPE 2 DIABETES MELLITUS WITH DIABETIC CHRONIC KIDNEY DISEASE: ICD-10-CM

## 2021-03-11 DIAGNOSIS — Z90.710 ACQUIRED ABSENCE OF BOTH CERVIX AND UTERUS: Chronic | ICD-10-CM

## 2021-03-11 DIAGNOSIS — M48.061 SPINAL STENOSIS, LUMBAR REGION WITHOUT NEUROGENIC CLAUDICATION: ICD-10-CM

## 2021-03-11 DIAGNOSIS — Z29.9 ENCOUNTER FOR PROPHYLACTIC MEASURES, UNSPECIFIED: ICD-10-CM

## 2021-03-11 DIAGNOSIS — J45.909 UNSPECIFIED ASTHMA, UNCOMPLICATED: ICD-10-CM

## 2021-03-11 DIAGNOSIS — L08.9 LOCAL INFECTION OF THE SKIN AND SUBCUTANEOUS TISSUE, UNSPECIFIED: ICD-10-CM

## 2021-03-11 DIAGNOSIS — E55.9 VITAMIN D DEFICIENCY, UNSPECIFIED: ICD-10-CM

## 2021-03-11 DIAGNOSIS — Z98.890 OTHER SPECIFIED POSTPROCEDURAL STATES: Chronic | ICD-10-CM

## 2021-03-11 DIAGNOSIS — K59.00 CONSTIPATION, UNSPECIFIED: ICD-10-CM

## 2021-03-11 DIAGNOSIS — I10 ESSENTIAL (PRIMARY) HYPERTENSION: ICD-10-CM

## 2021-03-11 DIAGNOSIS — Z90.49 ACQUIRED ABSENCE OF OTHER SPECIFIED PARTS OF DIGESTIVE TRACT: Chronic | ICD-10-CM

## 2021-03-11 DIAGNOSIS — N18.31 CHRONIC KIDNEY DISEASE, STAGE 3A: ICD-10-CM

## 2021-03-11 DIAGNOSIS — I50.9 HEART FAILURE, UNSPECIFIED: ICD-10-CM

## 2021-03-11 DIAGNOSIS — R09.89 OTHER SPECIFIED SYMPTOMS AND SIGNS INVOLVING THE CIRCULATORY AND RESPIRATORY SYSTEMS: ICD-10-CM

## 2021-03-11 DIAGNOSIS — L03.115 CELLULITIS OF RIGHT LOWER LIMB: ICD-10-CM

## 2021-03-11 LAB
ALBUMIN SERPL ELPH-MCNC: 4.2 G/DL — SIGNIFICANT CHANGE UP (ref 3.3–5)
ALP SERPL-CCNC: 116 U/L — SIGNIFICANT CHANGE UP (ref 40–120)
ALT FLD-CCNC: 19 U/L — SIGNIFICANT CHANGE UP (ref 4–33)
ANION GAP SERPL CALC-SCNC: 15 MMOL/L — HIGH (ref 7–14)
APTT BLD: 26.9 SEC — LOW (ref 27–36.3)
AST SERPL-CCNC: 26 U/L — SIGNIFICANT CHANGE UP (ref 4–32)
BASE EXCESS BLDV CALC-SCNC: 6.2 MMOL/L — HIGH (ref -3–2)
BASOPHILS # BLD AUTO: 0.06 K/UL — SIGNIFICANT CHANGE UP (ref 0–0.2)
BASOPHILS NFR BLD AUTO: 0.6 % — SIGNIFICANT CHANGE UP (ref 0–2)
BILIRUB SERPL-MCNC: 0.7 MG/DL — SIGNIFICANT CHANGE UP (ref 0.2–1.2)
BLD GP AB SCN SERPL QL: NEGATIVE — SIGNIFICANT CHANGE UP
BLOOD GAS VENOUS - CREATININE: 1.6 MG/DL — HIGH (ref 0.5–1.3)
BLOOD GAS VENOUS COMPREHENSIVE RESULT: SIGNIFICANT CHANGE UP
BUN SERPL-MCNC: 25 MG/DL — HIGH (ref 7–23)
CALCIUM SERPL-MCNC: 10 MG/DL — SIGNIFICANT CHANGE UP (ref 8.4–10.5)
CHLORIDE BLDV-SCNC: 103 MMOL/L — SIGNIFICANT CHANGE UP (ref 96–108)
CHLORIDE SERPL-SCNC: 99 MMOL/L — SIGNIFICANT CHANGE UP (ref 98–107)
CO2 SERPL-SCNC: 27 MMOL/L — SIGNIFICANT CHANGE UP (ref 22–31)
CREAT SERPL-MCNC: 1.51 MG/DL — HIGH (ref 0.5–1.3)
EOSINOPHIL # BLD AUTO: 0.16 K/UL — SIGNIFICANT CHANGE UP (ref 0–0.5)
EOSINOPHIL NFR BLD AUTO: 1.5 % — SIGNIFICANT CHANGE UP (ref 0–6)
GAS PNL BLDV: 135 MMOL/L — LOW (ref 136–146)
GLUCOSE BLDC GLUCOMTR-MCNC: 145 MG/DL — HIGH (ref 70–99)
GLUCOSE BLDC GLUCOMTR-MCNC: 212 MG/DL — HIGH (ref 70–99)
GLUCOSE BLDC GLUCOMTR-MCNC: 67 MG/DL — LOW (ref 70–99)
GLUCOSE BLDV-MCNC: 81 MG/DL — SIGNIFICANT CHANGE UP (ref 70–99)
GLUCOSE SERPL-MCNC: 81 MG/DL — SIGNIFICANT CHANGE UP (ref 70–99)
HCO3 BLDV-SCNC: 28 MMOL/L — HIGH (ref 20–27)
HCT VFR BLD CALC: 42 % — SIGNIFICANT CHANGE UP (ref 34.5–45)
HCT VFR BLDA CALC: 43 % — SIGNIFICANT CHANGE UP (ref 34.5–46.5)
HGB BLD CALC-MCNC: 14 G/DL — SIGNIFICANT CHANGE UP (ref 11.5–15.5)
HGB BLD-MCNC: 13.2 G/DL — SIGNIFICANT CHANGE UP (ref 11.5–15.5)
IANC: 7.76 K/UL — SIGNIFICANT CHANGE UP (ref 1.5–8.5)
IMM GRANULOCYTES NFR BLD AUTO: 0.9 % — SIGNIFICANT CHANGE UP (ref 0–1.5)
INR BLD: 1.12 RATIO — SIGNIFICANT CHANGE UP (ref 0.88–1.16)
LACTATE BLDV-MCNC: 1.9 MMOL/L — SIGNIFICANT CHANGE UP (ref 0.5–2)
LYMPHOCYTES # BLD AUTO: 1.69 K/UL — SIGNIFICANT CHANGE UP (ref 1–3.3)
LYMPHOCYTES # BLD AUTO: 16 % — SIGNIFICANT CHANGE UP (ref 13–44)
MCHC RBC-ENTMCNC: 28.1 PG — SIGNIFICANT CHANGE UP (ref 27–34)
MCHC RBC-ENTMCNC: 31.4 GM/DL — LOW (ref 32–36)
MCV RBC AUTO: 89.6 FL — SIGNIFICANT CHANGE UP (ref 80–100)
MONOCYTES # BLD AUTO: 0.81 K/UL — SIGNIFICANT CHANGE UP (ref 0–0.9)
MONOCYTES NFR BLD AUTO: 7.7 % — SIGNIFICANT CHANGE UP (ref 2–14)
NEUTROPHILS # BLD AUTO: 7.76 K/UL — HIGH (ref 1.8–7.4)
NEUTROPHILS NFR BLD AUTO: 73.3 % — SIGNIFICANT CHANGE UP (ref 43–77)
NRBC # BLD: 0 /100 WBCS — SIGNIFICANT CHANGE UP
NRBC # FLD: 0 K/UL — SIGNIFICANT CHANGE UP
PCO2 BLDV: 59 MMHG — HIGH (ref 41–51)
PH BLDV: 7.35 — SIGNIFICANT CHANGE UP (ref 7.32–7.43)
PLATELET # BLD AUTO: 246 K/UL — SIGNIFICANT CHANGE UP (ref 150–400)
PO2 BLDV: 55 MMHG — HIGH (ref 35–40)
POTASSIUM BLDV-SCNC: 6.1 MMOL/L — HIGH (ref 3.4–4.5)
POTASSIUM SERPL-MCNC: 4.5 MMOL/L — SIGNIFICANT CHANGE UP (ref 3.5–5.3)
POTASSIUM SERPL-SCNC: 4.5 MMOL/L — SIGNIFICANT CHANGE UP (ref 3.5–5.3)
PROT SERPL-MCNC: 8 G/DL — SIGNIFICANT CHANGE UP (ref 6–8.3)
PROTHROM AB SERPL-ACNC: 12.8 SEC — SIGNIFICANT CHANGE UP (ref 10.6–13.6)
RBC # BLD: 4.69 M/UL — SIGNIFICANT CHANGE UP (ref 3.8–5.2)
RBC # FLD: 13.9 % — SIGNIFICANT CHANGE UP (ref 10.3–14.5)
RH IG SCN BLD-IMP: POSITIVE — SIGNIFICANT CHANGE UP
SAO2 % BLDV: 84.5 % — SIGNIFICANT CHANGE UP (ref 60–85)
SARS-COV-2 RNA SPEC QL NAA+PROBE: SIGNIFICANT CHANGE UP
SODIUM SERPL-SCNC: 141 MMOL/L — SIGNIFICANT CHANGE UP (ref 135–145)
WBC # BLD: 10.57 K/UL — HIGH (ref 3.8–10.5)
WBC # FLD AUTO: 10.57 K/UL — HIGH (ref 3.8–10.5)

## 2021-03-11 PROCEDURE — 99285 EMERGENCY DEPT VISIT HI MDM: CPT

## 2021-03-11 PROCEDURE — 99223 1ST HOSP IP/OBS HIGH 75: CPT | Mod: GC

## 2021-03-11 PROCEDURE — 73630 X-RAY EXAM OF FOOT: CPT | Mod: 26,RT

## 2021-03-11 RX ORDER — OXYCODONE HYDROCHLORIDE 5 MG/1
7.5 TABLET ORAL EVERY 8 HOURS
Refills: 0 | Status: DISCONTINUED | OUTPATIENT
Start: 2021-03-11 | End: 2021-03-16

## 2021-03-11 RX ORDER — OXYCODONE HYDROCHLORIDE 5 MG/1
80 TABLET ORAL EVERY 12 HOURS
Refills: 0 | Status: DISCONTINUED | OUTPATIENT
Start: 2021-03-11 | End: 2021-03-16

## 2021-03-11 RX ORDER — INSULIN LISPRO 100/ML
VIAL (ML) SUBCUTANEOUS AT BEDTIME
Refills: 0 | Status: DISCONTINUED | OUTPATIENT
Start: 2021-03-11 | End: 2021-03-16

## 2021-03-11 RX ORDER — FUROSEMIDE 40 MG
40 TABLET ORAL DAILY
Refills: 0 | Status: DISCONTINUED | OUTPATIENT
Start: 2021-03-11 | End: 2021-03-16

## 2021-03-11 RX ORDER — DEXTROSE 50 % IN WATER 50 %
12.5 SYRINGE (ML) INTRAVENOUS ONCE
Refills: 0 | Status: DISCONTINUED | OUTPATIENT
Start: 2021-03-11 | End: 2021-03-16

## 2021-03-11 RX ORDER — CEFEPIME 1 G/1
2000 INJECTION, POWDER, FOR SOLUTION INTRAMUSCULAR; INTRAVENOUS EVERY 12 HOURS
Refills: 0 | Status: DISCONTINUED | OUTPATIENT
Start: 2021-03-12 | End: 2021-03-15

## 2021-03-11 RX ORDER — HYDROMORPHONE HYDROCHLORIDE 2 MG/ML
1 INJECTION INTRAMUSCULAR; INTRAVENOUS; SUBCUTANEOUS ONCE
Refills: 0 | Status: DISCONTINUED | OUTPATIENT
Start: 2021-03-11 | End: 2021-03-11

## 2021-03-11 RX ORDER — SODIUM CHLORIDE 9 MG/ML
1000 INJECTION, SOLUTION INTRAVENOUS
Refills: 0 | Status: DISCONTINUED | OUTPATIENT
Start: 2021-03-11 | End: 2021-03-16

## 2021-03-11 RX ORDER — SENNA PLUS 8.6 MG/1
2 TABLET ORAL AT BEDTIME
Refills: 0 | Status: DISCONTINUED | OUTPATIENT
Start: 2021-03-11 | End: 2021-03-16

## 2021-03-11 RX ORDER — INSULIN ASPART 100 [IU]/ML
0 INJECTION, SOLUTION SUBCUTANEOUS
Qty: 0 | Refills: 0 | DISCHARGE

## 2021-03-11 RX ORDER — DEXTROSE 50 % IN WATER 50 %
25 SYRINGE (ML) INTRAVENOUS ONCE
Refills: 0 | Status: DISCONTINUED | OUTPATIENT
Start: 2021-03-11 | End: 2021-03-16

## 2021-03-11 RX ORDER — ONDANSETRON 8 MG/1
4 TABLET, FILM COATED ORAL EVERY 6 HOURS
Refills: 0 | Status: DISCONTINUED | OUTPATIENT
Start: 2021-03-11 | End: 2021-03-16

## 2021-03-11 RX ORDER — AMLODIPINE BESYLATE 2.5 MG/1
5 TABLET ORAL ONCE
Refills: 0 | Status: COMPLETED | OUTPATIENT
Start: 2021-03-11 | End: 2021-03-11

## 2021-03-11 RX ORDER — IPRATROPIUM BROMIDE 0.2 MG/ML
1 SOLUTION, NON-ORAL INHALATION EVERY 6 HOURS
Refills: 0 | Status: DISCONTINUED | OUTPATIENT
Start: 2021-03-11 | End: 2021-03-16

## 2021-03-11 RX ORDER — HEPARIN SODIUM 5000 [USP'U]/ML
5000 INJECTION INTRAVENOUS; SUBCUTANEOUS EVERY 8 HOURS
Refills: 0 | Status: DISCONTINUED | OUTPATIENT
Start: 2021-03-11 | End: 2021-03-16

## 2021-03-11 RX ORDER — INSULIN LISPRO 100/ML
VIAL (ML) SUBCUTANEOUS
Refills: 0 | Status: DISCONTINUED | OUTPATIENT
Start: 2021-03-11 | End: 2021-03-16

## 2021-03-11 RX ORDER — FUROSEMIDE 40 MG
0 TABLET ORAL
Qty: 0 | Refills: 2 | DISCHARGE

## 2021-03-11 RX ORDER — VANCOMYCIN HCL 1 G
1000 VIAL (EA) INTRAVENOUS ONCE
Refills: 0 | Status: COMPLETED | OUTPATIENT
Start: 2021-03-11 | End: 2021-03-11

## 2021-03-11 RX ORDER — BUDESONIDE AND FORMOTEROL FUMARATE DIHYDRATE 160; 4.5 UG/1; UG/1
2 AEROSOL RESPIRATORY (INHALATION)
Refills: 0 | Status: DISCONTINUED | OUTPATIENT
Start: 2021-03-11 | End: 2021-03-16

## 2021-03-11 RX ORDER — DEXTROSE 50 % IN WATER 50 %
15 SYRINGE (ML) INTRAVENOUS ONCE
Refills: 0 | Status: DISCONTINUED | OUTPATIENT
Start: 2021-03-11 | End: 2021-03-16

## 2021-03-11 RX ORDER — OXYCODONE HYDROCHLORIDE 5 MG/1
0 TABLET ORAL
Qty: 0 | Refills: 0 | DISCHARGE

## 2021-03-11 RX ORDER — INSULIN DETEMIR 100/ML (3)
0 INSULIN PEN (ML) SUBCUTANEOUS
Qty: 0 | Refills: 2 | DISCHARGE

## 2021-03-11 RX ORDER — CLONAZEPAM 1 MG
0 TABLET ORAL
Qty: 0 | Refills: 0 | DISCHARGE

## 2021-03-11 RX ORDER — INSULIN LISPRO 100/ML
12 VIAL (ML) SUBCUTANEOUS
Refills: 0 | Status: DISCONTINUED | OUTPATIENT
Start: 2021-03-11 | End: 2021-03-16

## 2021-03-11 RX ORDER — CLONAZEPAM 1 MG
0.5 TABLET ORAL AT BEDTIME
Refills: 0 | Status: DISCONTINUED | OUTPATIENT
Start: 2021-03-11 | End: 2021-03-16

## 2021-03-11 RX ORDER — SODIUM CHLORIDE 9 MG/ML
3 INJECTION INTRAMUSCULAR; INTRAVENOUS; SUBCUTANEOUS EVERY 8 HOURS
Refills: 0 | Status: DISCONTINUED | OUTPATIENT
Start: 2021-03-11 | End: 2021-03-16

## 2021-03-11 RX ORDER — ATORVASTATIN CALCIUM 80 MG/1
80 TABLET, FILM COATED ORAL AT BEDTIME
Refills: 0 | Status: DISCONTINUED | OUTPATIENT
Start: 2021-03-11 | End: 2021-03-16

## 2021-03-11 RX ORDER — ACETAMINOPHEN 500 MG
650 TABLET ORAL EVERY 6 HOURS
Refills: 0 | Status: DISCONTINUED | OUTPATIENT
Start: 2021-03-11 | End: 2021-03-16

## 2021-03-11 RX ORDER — ASPIRIN/CALCIUM CARB/MAGNESIUM 324 MG
81 TABLET ORAL DAILY
Refills: 0 | Status: DISCONTINUED | OUTPATIENT
Start: 2021-03-11 | End: 2021-03-16

## 2021-03-11 RX ORDER — ALBUTEROL 90 UG/1
2 AEROSOL, METERED ORAL EVERY 6 HOURS
Refills: 0 | Status: DISCONTINUED | OUTPATIENT
Start: 2021-03-11 | End: 2021-03-16

## 2021-03-11 RX ORDER — ONDANSETRON 8 MG/1
1 TABLET, FILM COATED ORAL
Qty: 0 | Refills: 0 | DISCHARGE

## 2021-03-11 RX ORDER — POLYETHYLENE GLYCOL 3350 17 G/17G
17 POWDER, FOR SOLUTION ORAL DAILY
Refills: 0 | Status: DISCONTINUED | OUTPATIENT
Start: 2021-03-11 | End: 2021-03-14

## 2021-03-11 RX ORDER — CEFEPIME 1 G/1
2000 INJECTION, POWDER, FOR SOLUTION INTRAMUSCULAR; INTRAVENOUS ONCE
Refills: 0 | Status: COMPLETED | OUTPATIENT
Start: 2021-03-11 | End: 2021-03-11

## 2021-03-11 RX ORDER — FLUTICASONE PROPIONATE AND SALMETEROL 50; 250 UG/1; UG/1
2 POWDER ORAL; RESPIRATORY (INHALATION)
Qty: 0 | Refills: 0 | DISCHARGE

## 2021-03-11 RX ORDER — GLUCAGON INJECTION, SOLUTION 0.5 MG/.1ML
1 INJECTION, SOLUTION SUBCUTANEOUS ONCE
Refills: 0 | Status: DISCONTINUED | OUTPATIENT
Start: 2021-03-11 | End: 2021-03-16

## 2021-03-11 RX ORDER — INSULIN DETEMIR 100/ML (3)
64 INSULIN PEN (ML) SUBCUTANEOUS
Refills: 0 | Status: DISCONTINUED | OUTPATIENT
Start: 2021-03-11 | End: 2021-03-16

## 2021-03-11 RX ORDER — SODIUM CHLORIDE 9 MG/ML
1000 INJECTION, SOLUTION INTRAVENOUS ONCE
Refills: 0 | Status: COMPLETED | OUTPATIENT
Start: 2021-03-11 | End: 2021-03-11

## 2021-03-11 RX ORDER — CHOLECALCIFEROL (VITAMIN D3) 125 MCG
2000 CAPSULE ORAL DAILY
Refills: 0 | Status: DISCONTINUED | OUTPATIENT
Start: 2021-03-11 | End: 2021-03-16

## 2021-03-11 RX ADMIN — Medication 250 MILLIGRAM(S): at 19:14

## 2021-03-11 RX ADMIN — CEFEPIME 100 MILLIGRAM(S): 1 INJECTION, POWDER, FOR SOLUTION INTRAMUSCULAR; INTRAVENOUS at 18:21

## 2021-03-11 RX ADMIN — HEPARIN SODIUM 5000 UNIT(S): 5000 INJECTION INTRAVENOUS; SUBCUTANEOUS at 23:12

## 2021-03-11 RX ADMIN — BUDESONIDE AND FORMOTEROL FUMARATE DIHYDRATE 2 PUFF(S): 160; 4.5 AEROSOL RESPIRATORY (INHALATION) at 23:08

## 2021-03-11 RX ADMIN — SODIUM CHLORIDE 3 MILLILITER(S): 9 INJECTION INTRAMUSCULAR; INTRAVENOUS; SUBCUTANEOUS at 23:08

## 2021-03-11 RX ADMIN — HYDROMORPHONE HYDROCHLORIDE 1 MILLIGRAM(S): 2 INJECTION INTRAMUSCULAR; INTRAVENOUS; SUBCUTANEOUS at 18:56

## 2021-03-11 RX ADMIN — Medication 1 PUFF(S): at 23:08

## 2021-03-11 RX ADMIN — ATORVASTATIN CALCIUM 80 MILLIGRAM(S): 80 TABLET, FILM COATED ORAL at 23:13

## 2021-03-11 RX ADMIN — Medication 0.5 MILLIGRAM(S): at 23:13

## 2021-03-11 RX ADMIN — AMLODIPINE BESYLATE 5 MILLIGRAM(S): 2.5 TABLET ORAL at 23:13

## 2021-03-11 RX ADMIN — OXYCODONE HYDROCHLORIDE 80 MILLIGRAM(S): 5 TABLET ORAL at 23:12

## 2021-03-11 RX ADMIN — Medication 64 UNIT(S): at 23:14

## 2021-03-11 RX ADMIN — SODIUM CHLORIDE 1000 MILLILITER(S): 9 INJECTION, SOLUTION INTRAVENOUS at 18:22

## 2021-03-11 NOTE — H&P ADULT - ATTENDING COMMENTS
65 year old female, with past history as above, being managed for Cellulitis of the right foot.  Seen by Podiatry team.  On cefepime (s/p cefepime and vancomycin in the ED).  L-foot/toe areas also appear inflamed.  On oxycodone at home, but not achieving adequate relief.  Prescribed Oxycodone ER 80 mg Q12H and 7.5 mg IR PRN.  Would give PRN Motrin, as needed, but w. caution to prevent GI irritation.  Would benefit from Dietitian consult.  Physical therapy eval.  If worsening sign of infection, would get ID consult.    All other management as prescribed.

## 2021-03-11 NOTE — H&P ADULT - PROBLEM SELECTOR PLAN 3
-BS= 81, FS= 67.  -F/U A1C.  -DM diet.  - Continue Levemir and Humalog @ 80% home dose.  - FS QID  - ISS.

## 2021-03-11 NOTE — H&P ADULT - SKIN COMMENTS
Limited: R LE plantar foot with 5 cm stage 3 skin ulcer, with surrounding erythema swelling and tenderness.

## 2021-03-11 NOTE — H&P ADULT - PROBLEM SELECTOR PLAN 1
-Continue Cefepime 2g IV Q12*.   -Pt received Vanco 1 g IV in the Ed. Per pharmacy: dose Vancomycin pending Vanco trough in AM.   - F/U B/L LE Duplex.  - F/U Wound Cx & Blood Cx  - F/U MRI w/ wo of R foot.  - F/U PT, SW and wound care assessment.

## 2021-03-11 NOTE — H&P ADULT - NSHPSOCIALHISTORY_GEN_ALL_CORE
. But septated.  Lives with sons.   On disability.  Denies Nicotine history.  Denies ETOH, Illicit/ recreational drugs.  Colonoscopy 2018 : Benign polyps.   Mammogram 2018 Normal.  Flu Vax; 10 / 2020/  Covid Vax: Denies. . But .  Lives with sons.   On disability.  Denies Nicotine history.  Denies ETOH, Illicit/ recreational drugs.  Colonoscopy 2018 : Benign polyps.   Mammogram 2018 Normal.  Flu Vax; 10 / 2020/  COVID Vax: Denies.

## 2021-03-11 NOTE — ED PROVIDER NOTE - PHYSICAL EXAMINATION
Gen: Awake, Alert, WD, WN, NAD  Head:  NC/AT  Eyes:  PERRL, EOMI, Conjunctiva pink, lids normal, no scleral icterus  ENT: OP clear, no exudates, no erythema, uvula midline, TMs clear bilaterally, moist mucus membranes  Neck: supple, nontender, no meningismus, no JVD, trachea midline  Cardiac/CV:  S1 S2, RRR, no M/G/R  Respiratory/Pulm:  CTAB, good air movement, normal resp effort, no wheezes/stridor/retractions/rales/rhonchi  Gastrointestinal/Abdomen:  Soft, nontender, nondistended, +BS, no rebound/guarding  Back:  no CVAT, no MLT  Ext:  warm, a, CFT <3 seconds right, > 5 seconds left, 3+ pitting edema   Musculoskeletal/Ortho: unremarkable  Skin:  right foot plantar wound to SubQ,, 3+ pitting edema, cellulitis to ankle,   Neuro:  AAOx3, sensation intact, motor 5/5 x 4 extremities, speech clear

## 2021-03-11 NOTE — H&P ADULT - PROBLEM SELECTOR PLAN 2
Likely Opoid induced.  Last BM 3/10 consider small amount.  Continue Miralax po QHS.   Senna 2 tabs QHS added to medication regimen. Likely Opoid induced.  Last BM 3/10 consider small amount.  F/U JONA  Continue Miralax po QHS.   Senna 2 tabs QHS added to medication regimen. Likely Opoid induced.  Last BM 3/10 consider small amount.  F/U TSH  Continue Miralax po QHS.   Senna 2 tabs QHS added to medication regimen.

## 2021-03-11 NOTE — ED PROVIDER NOTE - OBJECTIVE STATEMENT
64 y/o female w/ PMH DVT, spinal stenosis, DM, HTN, HLD  presenting to ER with worsening right foot infection. Patient states for past few weeks she has noticed worsening RLE swelling and redness. Seen by home podiatrist today who addressed wounds and recommended her to come to ER for worsening infection. As per patient, there is foul swelling drainage from R foot. She denies fever, trauma to the foot, . She denies chest pain, Sob acutely but states she has sob chronically from her CHF. denies fever, chills, chest pain, SOB, abdominal pain, diarrhea, dysuria, syncope, bleeding,+ new rash,weakness, numbness, blurred vision.Podiatry at bedside - recommending admission for cellulitis vs r/o osteomyelitis.

## 2021-03-11 NOTE — H&P ADULT - ASSESSMENT
65F history of CDK3, DM2. HTN, Asthma, DVT, Insomnia, Spinal stenosis, Uterine Ca s/p MEGAN, old LLE DVT no longer on A/C, admitted for R LE cellulitis due to R plantar foot diabetic ulcer.   Podiatry consult appreciated.  [ x ]  Lab studies reviewed  [ x ]  Radiology reviewed  [ x ]  Old records reviewed    65F history of CDK3, DM2. HTN, Asthma, DVT, Insomnia, Spinal stenosis, Uterine Ca s/p MEGAN, old LLE DVT no longer on A/C, admitted for R LE cellulitis due to R plantar foot diabetic ulcer.   Podiatry consult appreciated.

## 2021-03-11 NOTE — H&P ADULT - PROBLEM SELECTOR PLAN 7
Creatinine improved fro 1.9 to 1.5.  Pt no longer on Lisinopril.  Monitor BMP.  Avoid nephrotoxic agents.

## 2021-03-11 NOTE — ED PROVIDER NOTE - CLINICAL SUMMARY MEDICAL DECISION MAKING FREE TEXT BOX
64 y/o F with PMH morbid obesity, DVT, spinal stenosis, DM, HTN, HLD p/w right foot swelling and infection .  admission for right LE cellulitis w/ wound, abx, xray, podiatry cultures, admit

## 2021-03-11 NOTE — ED PROVIDER NOTE - NS_EDPROVIDERDISPOUSERTYPE_ED_A_ED
KALYANI requesting a return call to discuss below response from Reji Sharma PA-C    Per Saturnino...There is a risk of permanent damage due to prolonged stenosis. He has already experienced numbness and weakness. At five months of symptoms we have some time before being overly concerned. Alternatives would be the lumbar traction. We could certainly ask Dr. Estrada on Friday to consider adding this case as an emergent surgery due to the weakness and numbness.   Attending Attestation (For Attendings USE Only)...

## 2021-03-11 NOTE — H&P ADULT - NSICDXPASTMEDICALHX_GEN_ALL_CORE_FT
PAST MEDICAL HISTORY:  Asthma     Cataract     Cervical Stenosis of Spine     CKD (chronic kidney disease)     Congestive heart failure     Deep Vein Thrombosis (DVT) Left leg, 2004, treated and resolved    Diabetes Mellitus Type II     Dyslipidemia     Endometrial Hyperplasia     HTN (Hypertension)     Insomnia     Morbid Obesity     Spinal Stenosis, Lumbar     Uterine cancer     Vitamin D deficiency      PAST MEDICAL HISTORY:  Asthma     Cataract     Cervical Stenosis of Spine     CKD (chronic kidney disease) ~ III    Congestive heart failure ~ HFpEF    Deep Vein Thrombosis (DVT) Left leg, 2004, treated and resolved    Diabetes Mellitus Type II     Dyslipidemia     Endometrial Hyperplasia     Gait difficulty ~ u ses walker    HTN (Hypertension)     Insomnia     Morbid Obesity     On home oxygen therapy     Spinal Stenosis, Lumbar     Uterine cancer     Vitamin D deficiency

## 2021-03-11 NOTE — CONSULT NOTE ADULT - ASSESSMENT
65 F RF wound to SuB cellulitis to ankle  - pt seen and evaluated  - afebrile, vitals stable  - labs pending, x rays pending   - right foot plantar wound to SubQ, no purulence, no drainage, no malodor, no fluctuance, 3+ pitting edema, cellulitis to ankle, etiology diabetic neuropathy  - excisional debridement of wound performed down to SUbQ but not beyond using sterile # 15 blade, wound flushed, DSD applied  - wound culture taken  - ordered MRI  - rec admit to medicine for IV Vanco/Cefepime  - pod plan pending MR  - will follow  - discussed w/ attending

## 2021-03-11 NOTE — ED PROVIDER NOTE - ATTENDING CONTRIBUTION TO CARE
64 y/o F with PMH morbid obesity, DVT, spinal stenosis, DM, HTN, HLD p/w right foot swelling and infection .  pt reports RLE swelling and redness. Pt arrives with RLE wrapped by her home health podiatrist. As per patient, there is foul swelling drainage from R foot. She denies fever. She reports pain in the foot with drainage. she denies trauma to the foot, she was seen at home by podiatry and was recommended to come to the ED. She denies chest pain, Sob acutely but states she has sob chronically from her CHF.  denies fever, chills, chest pain, SOB, abdominal pain, diarrhea, dysuria, syncope, bleeding,+ new rash,weakness, numbness, blurred vision    ROS  otherwise negative as per HPI  Gen: Awake, Alert, WD, WN, NAD  Head:  NC/AT  Eyes:  PERRL, EOMI, Conjunctiva pink, lids normal, no scleral icterus  ENT: OP clear, no exudates, no erythema, uvula midline, TMs clear bilaterally, moist mucus membranes  Neck: supple, nontender, no meningismus, no JVD, trachea midline  Cardiac/CV:  S1 S2, RRR, no M/G/R  Respiratory/Pulm:  CTAB, good air movement, normal resp effort, no wheezes/stridor/retractions/rales/rhonchi  Gastrointestinal/Abdomen:  Soft, nontender, nondistended, +BS, no rebound/guarding  Back:  no CVAT, no MLT  Ext:  warm, a, CFT <3 seconds right, > 5 seconds left, 3+ pitting edema   Musculoskeletal/Ortho: unremarkable  Skin:  right foot plantar wound to SubQ,, 3+ pitting edema, cellulitis to ankle,   Neuro:  AAOx3, sensation intact, motor 5/5 x 4 extremities, speech clear  MDM as above

## 2021-03-11 NOTE — H&P ADULT - HISTORY OF PRESENT ILLNESS
65F, morbidly obese, ambulates with a walker due to spinal stenosis, history of DM2 on insult x 26 years, Asthma with multiple hospitalizations, never intubated, old LLE DVT no longer on A/C x 5 years, Ovarian Ca s/p 2010 MEGAN, CKD3, Hyperlipidemia, HTN, HFpEF@70% on home O2 3L, Insomnia, Vit D Deficiency, the pt was seen by the visiting home MD and saw the R LE plantar ulcer worsening, the ulcer was lanced and drained at the pt's home and the pt was sent to the ED, [facility] for Abx IV and MRI of R LE. In the Ed, the pt was given Cefepime 2g IV and Vancomycin 1 G IV, she was seen by Podiatry. Their consult and recommendations are in the chart. The pt admits to Occipital HA, SOB, LEGER after ambulating 3 feet, non exertional, sub sternal 5/10, chest cramping sensation, that began at 4am on 3/11, subsiding on it's own and not returning, constipation with last BM, consider small on 3/10. Denies blurry vision, dizziness, falls, cough, palpitation, nausea, vomit, diarrhea, adnominal pain, dysuria, chills, generalized body aches. Compliant with medications and diet. Able to reconcile home meds.     Vitals : T max: 98.4 oral, HR= 80b/ min, BP = 180/ 60, RR= 16b/ min, SPO2= 96% ra

## 2021-03-11 NOTE — ED ADULT NURSE NOTE - OBJECTIVE STATEMENT
Pt. A&Ox4, states she was sent to ER by podiatrist for cellulitis treatment. Pt. has 2 chronic diabetic wounds under right foot. Wounds were drained today having foul smelling purulent drainage as per patient. Pt. uses walker for ambulation. No other skin breakdown noted on exam. Redness and swelling noted to b/l LE. h/o CHF and DM. Denies fevers/chills. Awaiting MD orders. Will continue to monitor.

## 2021-03-11 NOTE — H&P ADULT - NEGATIVE NEUROLOGICAL SYMPTOMS
no weakness/no paresthesias/no generalized seizures/no focal seizures/no syncope/no tremors/no vertigo/no loss of sensation/no loss of consciousness/no hemiparesis/no confusion/no facial palsy

## 2021-03-11 NOTE — H&P ADULT - MUSCULOSKELETAL COMMENTS
R LE swelling, erythema tenderness and worsening plantar ulcer. diminished ROM due to body habitus, B/L LE tenderness on palpation, B/L LE swelling R LE> L LE

## 2021-03-11 NOTE — H&P ADULT - PROBLEM SELECTOR PLAN 4
BP = 180/ 60.  Low salt diet.   Pt no longer on Lisinopril due to CKD.  Will give Norvasc 5 mg po x  for now.

## 2021-03-11 NOTE — H&P ADULT - RS GEN PE MLT RESP DETAILS PC
airway patent/breath sounds equal/respirations non-labored/clear to auscultation bilaterally/no chest wall tenderness/no intercostal retractions/no rales/no rhonchi/no wheezes

## 2021-03-11 NOTE — ED ADULT TRIAGE NOTE - CHIEF COMPLAINT QUOTE
Pt complaining of RLE swelling and redness. Pt arrives with RLE wrapped by her home health podiatrist. As per patient, there is foul swelling drainage from R foot. PMH spinal stenosis, DM, HTN

## 2021-03-11 NOTE — CONSULT NOTE ADULT - SUBJECTIVE AND OBJECTIVE BOX
Podiatry pager #: [phone] ([place])/ [phone] ([facility])    Patient is a 65y old  Female who presents with a chief complaint of right foot wound.    HPI: Pt complaining of RLE swelling and redness. Pt arrives with RLE wrapped by her home health podiatrist. As per patient, there is foul swelling drainage from R foot. PMH spinal stenosis, DM, HTN  RLE swelling, redness.        PAST MEDICAL & SURGICAL HISTORY:  CKD (chronic kidney disease)    Insomnia    Edema of lower extremity  on lasix    Vitamin D deficiency    Morbid Obesity    Cataract    Dyslipidemia    Deep Vein Thrombosis (DVT)  Left leg, 2004, treated and resolved    Spinal Stenosis, Lumbar    Cervical Stenosis of Spine    Endometrial Hyperplasia    HTN (Hypertension)    Diabetes Mellitus Type II    S/P cholecystectomy    S/P appendectomy    S/P total abdominal hysterectomy and bilateral salpingo-oophorectomy    H/O colonoscopy  1998    H/O laser iridotomy  left eye, 2016    Endometrial biopsy 12/02/09    Tonsillectomy as a child    Cervical Spinal Stenosis surgery x2 (01/2002, 7/2002)    D&amp;C 2008  hysteroscopy, endometrial hyperplasia, 2009    D&amp;C x2 1980&#x27;s    Cholecystectomy/appendectomy @ age 25    C Section 1994    Cataract extracted with lens implant 1998  Right        MEDICATIONS  (STANDING):  cefepime   IVPB 2000 milliGRAM(s) IV Intermittent once  vancomycin  IVPB 1000 milliGRAM(s) IV Intermittent once    MEDICATIONS  (PRN):      Allergies    adhesives (Rash)  Bactrim (Flushing)  latex (Rash)  wool- rash, itch (Other)    Intolerances        VITALS:    Vital Signs Last 24 Hrs  T(C): 36.9 (11 Mar 2021 13:53), Max: 36.9 (11 Mar 2021 13:53)  T(F): 98.4 (11 Mar 2021 13:53), Max: 98.4 (11 Mar 2021 13:53)  HR: 81 (11 Mar 2021 15:14) (81 - 90)  BP: 159/61 (11 Mar 2021 15:14) (159/61 - 165/77)  BP(mean): --  RR: 16 (11 Mar 2021 15:14) (16 - 16)  SpO2: 99% (11 Mar 2021 15:14) (96% - 99%)    LABS:                CAPILLARY BLOOD GLUCOSE      POCT Blood Glucose.: 80 mg/dL (11 Mar 2021 14:02)          LOWER EXTREMITY PHYSICAL EXAM:    Vascular: DP/PT 0/4 B/L d/y edema, CFT <3 seconds right, > 5 seconds left, Temperature gradient warm to warm B/L  Neuro: Epicritic sensation dimished to the level of ankle B/L  Musculoskeletal/Ortho: unremarkable  Skin:  Wound #1: right foot plantar wound to SubQ, no purulence, no drainage, no malodor, no fluctuance, 3+ pitting edema, cellulitis to ankle, etiology diabetic neuropathy    RADIOLOGY & ADDITIONAL STUDIES:  Pending

## 2021-03-11 NOTE — H&P ADULT - PROBLEM SELECTOR PLAN 10
VTE with heparin 5000U sub cut Q8*  Fall, Aspiration, safety, seizure precautions.   PT, SW, wound care eval.

## 2021-03-12 LAB
24R-OH-CALCIDIOL SERPL-MCNC: 23.8 NG/ML — LOW (ref 30–80)
A1C WITH ESTIMATED AVERAGE GLUCOSE RESULT: 8.5 % — HIGH (ref 4–5.6)
ANION GAP SERPL CALC-SCNC: 13 MMOL/L — SIGNIFICANT CHANGE UP (ref 7–14)
BUN SERPL-MCNC: 27 MG/DL — HIGH (ref 7–23)
CALCIUM SERPL-MCNC: 9.5 MG/DL — SIGNIFICANT CHANGE UP (ref 8.4–10.5)
CHLORIDE SERPL-SCNC: 100 MMOL/L — SIGNIFICANT CHANGE UP (ref 98–107)
CHOLEST SERPL-MCNC: 127 MG/DL — SIGNIFICANT CHANGE UP
CO2 SERPL-SCNC: 27 MMOL/L — SIGNIFICANT CHANGE UP (ref 22–31)
CREAT SERPL-MCNC: 1.7 MG/DL — HIGH (ref 0.5–1.3)
ESTIMATED AVERAGE GLUCOSE: 197 MG/DL — HIGH (ref 68–114)
GLUCOSE BLDC GLUCOMTR-MCNC: 120 MG/DL — HIGH (ref 70–99)
GLUCOSE BLDC GLUCOMTR-MCNC: 132 MG/DL — HIGH (ref 70–99)
GLUCOSE BLDC GLUCOMTR-MCNC: 151 MG/DL — HIGH (ref 70–99)
GLUCOSE BLDC GLUCOMTR-MCNC: 158 MG/DL — HIGH (ref 70–99)
GLUCOSE BLDC GLUCOMTR-MCNC: 210 MG/DL — HIGH (ref 70–99)
GLUCOSE SERPL-MCNC: 123 MG/DL — HIGH (ref 70–99)
HCT VFR BLD CALC: 38 % — SIGNIFICANT CHANGE UP (ref 34.5–45)
HDLC SERPL-MCNC: 61 MG/DL — SIGNIFICANT CHANGE UP
HGB BLD-MCNC: 11.8 G/DL — SIGNIFICANT CHANGE UP (ref 11.5–15.5)
LIPID PNL WITH DIRECT LDL SERPL: 47 MG/DL — SIGNIFICANT CHANGE UP
MAGNESIUM SERPL-MCNC: 1.9 MG/DL — SIGNIFICANT CHANGE UP (ref 1.6–2.6)
MCHC RBC-ENTMCNC: 28.4 PG — SIGNIFICANT CHANGE UP (ref 27–34)
MCHC RBC-ENTMCNC: 31.1 GM/DL — LOW (ref 32–36)
MCV RBC AUTO: 91.3 FL — SIGNIFICANT CHANGE UP (ref 80–100)
NON HDL CHOLESTEROL: 66 MG/DL — SIGNIFICANT CHANGE UP
NRBC # BLD: 0 /100 WBCS — SIGNIFICANT CHANGE UP
NRBC # FLD: 0 K/UL — SIGNIFICANT CHANGE UP
PHOSPHATE SERPL-MCNC: 3.3 MG/DL — SIGNIFICANT CHANGE UP (ref 2.5–4.5)
PLATELET # BLD AUTO: 228 K/UL — SIGNIFICANT CHANGE UP (ref 150–400)
POTASSIUM SERPL-MCNC: 4.3 MMOL/L — SIGNIFICANT CHANGE UP (ref 3.5–5.3)
POTASSIUM SERPL-SCNC: 4.3 MMOL/L — SIGNIFICANT CHANGE UP (ref 3.5–5.3)
RBC # BLD: 4.16 M/UL — SIGNIFICANT CHANGE UP (ref 3.8–5.2)
RBC # FLD: 13.9 % — SIGNIFICANT CHANGE UP (ref 10.3–14.5)
SODIUM SERPL-SCNC: 140 MMOL/L — SIGNIFICANT CHANGE UP (ref 135–145)
TRIGL SERPL-MCNC: 97 MG/DL — SIGNIFICANT CHANGE UP
TSH SERPL-MCNC: 3.07 UIU/ML — SIGNIFICANT CHANGE UP (ref 0.27–4.2)
VANCOMYCIN FLD-MCNC: 4.5 UG/ML — SIGNIFICANT CHANGE UP
WBC # BLD: 10.28 K/UL — SIGNIFICANT CHANGE UP (ref 3.8–10.5)
WBC # FLD AUTO: 10.28 K/UL — SIGNIFICANT CHANGE UP (ref 3.8–10.5)

## 2021-03-12 PROCEDURE — 73720 MRI LWR EXTREMITY W/O&W/DYE: CPT | Mod: 26,RT

## 2021-03-12 PROCEDURE — 93970 EXTREMITY STUDY: CPT | Mod: 26

## 2021-03-12 PROCEDURE — 99233 SBSQ HOSP IP/OBS HIGH 50: CPT

## 2021-03-12 RX ORDER — IBUPROFEN 200 MG
600 TABLET ORAL ONCE
Refills: 0 | Status: COMPLETED | OUTPATIENT
Start: 2021-03-12 | End: 2021-03-12

## 2021-03-12 RX ORDER — OXYCODONE HYDROCHLORIDE 5 MG/1
7.5 TABLET ORAL ONCE
Refills: 0 | Status: DISCONTINUED | OUTPATIENT
Start: 2021-03-12 | End: 2021-03-12

## 2021-03-12 RX ORDER — VANCOMYCIN HCL 1 G
1000 VIAL (EA) INTRAVENOUS ONCE
Refills: 0 | Status: COMPLETED | OUTPATIENT
Start: 2021-03-12 | End: 2021-03-12

## 2021-03-12 RX ADMIN — POLYETHYLENE GLYCOL 3350 17 GRAM(S): 17 POWDER, FOR SOLUTION ORAL at 13:39

## 2021-03-12 RX ADMIN — Medication 2: at 12:56

## 2021-03-12 RX ADMIN — SODIUM CHLORIDE 3 MILLILITER(S): 9 INJECTION INTRAMUSCULAR; INTRAVENOUS; SUBCUTANEOUS at 05:06

## 2021-03-12 RX ADMIN — BUDESONIDE AND FORMOTEROL FUMARATE DIHYDRATE 2 PUFF(S): 160; 4.5 AEROSOL RESPIRATORY (INHALATION) at 12:57

## 2021-03-12 RX ADMIN — BUDESONIDE AND FORMOTEROL FUMARATE DIHYDRATE 2 PUFF(S): 160; 4.5 AEROSOL RESPIRATORY (INHALATION) at 20:05

## 2021-03-12 RX ADMIN — Medication 0.5 MILLIGRAM(S): at 22:26

## 2021-03-12 RX ADMIN — Medication 1 PUFF(S): at 22:27

## 2021-03-12 RX ADMIN — OXYCODONE HYDROCHLORIDE 7.5 MILLIGRAM(S): 5 TABLET ORAL at 03:48

## 2021-03-12 RX ADMIN — CEFEPIME 100 MILLIGRAM(S): 1 INJECTION, POWDER, FOR SOLUTION INTRAMUSCULAR; INTRAVENOUS at 17:43

## 2021-03-12 RX ADMIN — SODIUM CHLORIDE 3 MILLILITER(S): 9 INJECTION INTRAMUSCULAR; INTRAVENOUS; SUBCUTANEOUS at 22:28

## 2021-03-12 RX ADMIN — OXYCODONE HYDROCHLORIDE 80 MILLIGRAM(S): 5 TABLET ORAL at 22:26

## 2021-03-12 RX ADMIN — HEPARIN SODIUM 5000 UNIT(S): 5000 INJECTION INTRAVENOUS; SUBCUTANEOUS at 05:21

## 2021-03-12 RX ADMIN — OXYCODONE HYDROCHLORIDE 80 MILLIGRAM(S): 5 TABLET ORAL at 08:57

## 2021-03-12 RX ADMIN — Medication 12 UNIT(S): at 12:56

## 2021-03-12 RX ADMIN — Medication 2: at 17:42

## 2021-03-12 RX ADMIN — CEFEPIME 100 MILLIGRAM(S): 1 INJECTION, POWDER, FOR SOLUTION INTRAMUSCULAR; INTRAVENOUS at 05:30

## 2021-03-12 RX ADMIN — Medication 12 UNIT(S): at 08:56

## 2021-03-12 RX ADMIN — SODIUM CHLORIDE 3 MILLILITER(S): 9 INJECTION INTRAMUSCULAR; INTRAVENOUS; SUBCUTANEOUS at 14:16

## 2021-03-12 RX ADMIN — OXYCODONE HYDROCHLORIDE 7.5 MILLIGRAM(S): 5 TABLET ORAL at 13:27

## 2021-03-12 RX ADMIN — OXYCODONE HYDROCHLORIDE 7.5 MILLIGRAM(S): 5 TABLET ORAL at 04:30

## 2021-03-12 RX ADMIN — HEPARIN SODIUM 5000 UNIT(S): 5000 INJECTION INTRAVENOUS; SUBCUTANEOUS at 13:28

## 2021-03-12 RX ADMIN — Medication 40 MILLIGRAM(S): at 05:21

## 2021-03-12 RX ADMIN — Medication 1 PUFF(S): at 17:41

## 2021-03-12 RX ADMIN — ALBUTEROL 2 PUFF(S): 90 AEROSOL, METERED ORAL at 09:10

## 2021-03-12 RX ADMIN — Medication 2000 UNIT(S): at 13:39

## 2021-03-12 RX ADMIN — Medication 64 UNIT(S): at 08:56

## 2021-03-12 RX ADMIN — Medication 12 UNIT(S): at 17:42

## 2021-03-12 RX ADMIN — HEPARIN SODIUM 5000 UNIT(S): 5000 INJECTION INTRAVENOUS; SUBCUTANEOUS at 22:27

## 2021-03-12 RX ADMIN — OXYCODONE HYDROCHLORIDE 7.5 MILLIGRAM(S): 5 TABLET ORAL at 14:00

## 2021-03-12 RX ADMIN — Medication 250 MILLIGRAM(S): at 17:43

## 2021-03-12 RX ADMIN — Medication 1 PUFF(S): at 12:56

## 2021-03-12 RX ADMIN — Medication 81 MILLIGRAM(S): at 13:39

## 2021-03-12 RX ADMIN — Medication 64 UNIT(S): at 22:27

## 2021-03-12 RX ADMIN — ATORVASTATIN CALCIUM 80 MILLIGRAM(S): 80 TABLET, FILM COATED ORAL at 22:26

## 2021-03-12 NOTE — PROGRESS NOTE ADULT - PROBLEM SELECTOR PLAN 3
-BS= 81, FS= 67.  -F/U A1C.  -DM diet.  - Continue Levemir and Humalog @ 80% home dose.  - FS QID  - ISS. -BS= 81, FS= 67.  -A1C. 8.5   -DM diet.  - Continue Levemir and Humalog @ 80% home dose.  - FS QID  - ISS.

## 2021-03-12 NOTE — PHYSICAL THERAPY INITIAL EVALUATION ADULT - PRECAUTIONS/LIMITATIONS, REHAB EVAL
fall precautions (+) IV/aspiration precautions/fall precautions/obesity precautions/seizure precautions

## 2021-03-12 NOTE — PHYSICAL THERAPY INITIAL EVALUATION ADULT - DIAGNOSIS, PT EVAL
Deconditioning, decrease strength, decrease endurance, decrease balance, and postural instability limiting functional mobility

## 2021-03-12 NOTE — PROGRESS NOTE ADULT - ASSESSMENT
[ x ]  Lab studies reviewed  [ x ]  Radiology reviewed  [ x ]  Old records reviewed    65F history of CDK3, DM2. HTN, Asthma, DVT, Insomnia, Spinal stenosis, Uterine Ca s/p MEGAN, old LLE DVT no longer on A/C, admitted for R LE cellulitis due to R plantar foot diabetic ulcer.   Podiatry consult appreciated.  65F history of CDK3, DM2. HTN, Asthma, DVT, Insomnia, Spinal stenosis, Uterine Ca s/p MEGAN, old LLE DVT no longer on A/C, admitted for R LE cellulitis due to R plantar foot diabetic ulcer.   Podiatry consult appreciated.

## 2021-03-12 NOTE — PHYSICAL THERAPY INITIAL EVALUATION ADULT - PHYSICAL ASSIST/NONPHYSICAL ASSIST, REHAB EVAL
verbal cues/nonverbal cues (demo/gestures)/1 person assist verbal cues/nonverbal cues (demo/gestures)/2 person assist

## 2021-03-12 NOTE — PHYSICAL THERAPY INITIAL EVALUATION ADULT - PERTINENT HX OF CURRENT PROBLEM, REHAB EVAL
Pt. is a 65 year old female, admitted with Right infected foot ulcer.  PMH: asthma, uterine cancer, CHF, CKD, morbid obesity, DVT, dyslipidemia, spinal stenosis, HTN.

## 2021-03-12 NOTE — PHYSICAL THERAPY INITIAL EVALUATION ADULT - GENERAL OBSERVATIONS, REHAB EVAL
Pt. received semisupine in bed, HOB 20 degrees. Pt. received semisupine in bed, Head of Bed 20 degrees. Pt. received semisupine in bed

## 2021-03-12 NOTE — PHYSICAL THERAPY INITIAL EVALUATION ADULT - ADDITIONAL COMMENTS
Pt. lives in a house with her two sons.  Pt. reports independence with transfers bed to chair/commode with a rolling walker.  Pt ambulated with a rolling walker prior to hospitalization.      Pt. left semisupine in bed, 20 degrees HOB, lines/tubes intact, and call bell in reach.  RN made aware. Pt. lives in a house with her two sons.  Pt. reports independence with transfers bed to chair/commode with a rolling walker.  Pt ambulated with a rolling walker prior to hospitalization.      Pt. left semisupine in bed, 20 degrees head of bed, lines/tubes intact, and call bell in reach. Pt. lives in a house with her two sons.  Pt. reports independence with transfers bed to chair/commode with a rolling walker.  Pt ambulated with a rolling walker prior to hospitalization.      Pt. left semisupine in bed at least 30 degrees head of bed, lines/tubes intact, and call bell in reach.

## 2021-03-12 NOTE — PROGRESS NOTE ADULT - SUBJECTIVE AND OBJECTIVE BOX
Podiatry pager #: [phone] ([place])/ [phone] ([facility])    Patient is a 65y old  Female who presents with a chief complaint of Worsening R foot ulcer x 1 day (11 Mar 2021 20:37)       INTERVAL HPI/OVERNIGHT EVENTS:  Patient seen and evaluated at bedside.  Pt is resting comfortable in NAD. Denies N/V/F/C.     Allergies    adhesives (Rash)  Bactrim (Flushing)  latex (Rash)  wool- rash, itch (Other)    Intolerances        Vital Signs Last 24 Hrs  T(C): 36.8 (12 Mar 2021 03:46), Max: 37.2 (12 Mar 2021 01:49)  T(F): 98.2 (12 Mar 2021 03:46), Max: 98.9 (12 Mar 2021 01:49)  HR: 82 (12 Mar 2021 05:08) (76 - 90)  BP: 119/50 (12 Mar 2021 05:08) (110/60 - 180/60)  BP(mean): --  RR: 20 (12 Mar 2021 03:46) (16 - 20)  SpO2: 99% (12 Mar 2021 03:46) (96% - 99%)    LABS:                        13.2   10.57 )-----------( 246      ( 11 Mar 2021 18:11 )             42.0     03-11    141  |  99  |  25<H>  ----------------------------<  81  4.5   |  27  |  1.51<H>    Ca    10.0      11 Mar 2021 18:11    TPro  8.0  /  Alb  4.2  /  TBili  0.7  /  DBili  x   /  AST  26  /  ALT  19  /  AlkPhos  116  03-11    PT/INR - ( 11 Mar 2021 18:11 )   PT: 12.8 sec;   INR: 1.12 ratio         PTT - ( 11 Mar 2021 18:11 )  PTT:26.9 sec    CAPILLARY BLOOD GLUCOSE      POCT Blood Glucose.: 120 mg/dL (12 Mar 2021 08:27)  POCT Blood Glucose.: 132 mg/dL (12 Mar 2021 04:01)  POCT Blood Glucose.: 212 mg/dL (11 Mar 2021 22:21)  POCT Blood Glucose.: 145 mg/dL (11 Mar 2021 20:16)  POCT Blood Glucose.: 67 mg/dL (11 Mar 2021 18:54)  POCT Blood Glucose.: 80 mg/dL (11 Mar 2021 14:02)      Lower Extremity Physical Exam:  Vascular: DP/PT 0/4 B/L d/y edema, CFT <3 seconds right, > 5 seconds left, Temperature gradient warm to warm B/L  Neuro: Epicritic sensation dimished to the level of ankle B/L  Musculoskeletal/Ortho: unremarkable  Skin:  Wound #1: right foot plantar wound to SubQ, no purulence, no drainage, no malodor, no fluctuance, 3+ pitting edema, cellulitis to ankle, etiology diabetic neuropathy    RADIOLOGY & ADDITIONAL TESTS:  < from: Xray Foot AP + Lateral + Oblique, Right (03.11.21 @ 17:16) >    EXAM:  RAD FOOT MIN 3 VIEWS RIGHT        PROCEDURE DATE:  Mar 11 2021         INTERPRETATION:  CLINICAL INDICATION: right foot infection    EXAM:  Oblique and lateral right foot from 3/11/2021 and 1716. Compared to prior study from 1/6/2021.    IMPRESSION:  Soft tissue swelling. Plantar hindfoot region shallow soft tissue ulceration again noted. No tracking gas collections beyond the ulcer margins and no gross radiographic evidence for underlying osteomyelitis.    Redemonstrated chronic midfootdeformity with bulky ossific debris in dorsal talonavicular region.    Normal forefoot configuration and alignment with preserved joint spaces.    No discrete suspicious lytic or blastic lesions.              ROB RAJPUT MD; Attending Radiologist  This document has been electronically signed. Mar 11 2021  5:46PM    < end of copied text >

## 2021-03-12 NOTE — PHYSICAL THERAPY INITIAL EVALUATION ADULT - LEVEL OF INDEPENDENCE: SIT/STAND, REHAB EVAL
further functional mobility not performed at this time secondary to pt. presenting with weakness and postural instability. will progress as appropriate./unable to perform

## 2021-03-12 NOTE — PROGRESS NOTE ADULT - PROBLEM SELECTOR PLAN 7
Creatinine improved fro 1.9 to 1.5.  Pt no longer on Lisinopril.  Monitor BMP.  Avoid nephrotoxic agents. Creatinine likely at baseline   Pt no longer on Lisinopril.  Monitor BMP.  Avoid nephrotoxic agents.

## 2021-03-12 NOTE — PROGRESS NOTE ADULT - SUBJECTIVE AND OBJECTIVE BOX
Patient is a 65y old  Female who presents with a chief complaint of Worsening R foot ulcer x 1 day (12 Mar 2021 08:41)      SUBJECTIVE / OVERNIGHT EVENTS:    MEDICATIONS  (STANDING):  aspirin enteric coated 81 milliGRAM(s) Oral daily  atorvastatin 80 milliGRAM(s) Oral at bedtime  budesonide 160 MICROgram(s)/formoterol 4.5 MICROgram(s) Inhaler 2 Puff(s) Inhalation two times a day  cefepime   IVPB 2000 milliGRAM(s) IV Intermittent every 12 hours  cholecalciferol 2000 Unit(s) Oral daily  clonazePAM  Tablet 0.5 milliGRAM(s) Oral at bedtime  dextrose 40% Gel 15 Gram(s) Oral once  dextrose 5%. 1000 milliLiter(s) (50 mL/Hr) IV Continuous <Continuous>  dextrose 5%. 1000 milliLiter(s) (100 mL/Hr) IV Continuous <Continuous>  dextrose 50% Injectable 25 Gram(s) IV Push once  dextrose 50% Injectable 12.5 Gram(s) IV Push once  dextrose 50% Injectable 25 Gram(s) IV Push once  furosemide    Tablet 40 milliGRAM(s) Oral daily  glucagon  Injectable 1 milliGRAM(s) IntraMuscular once  heparin   Injectable 5000 Unit(s) SubCutaneous every 8 hours  insulin detemir injectable (LEVEMIR) 64 Unit(s) SubCutaneous two times a day  insulin lispro (ADMELOG) corrective regimen sliding scale   SubCutaneous three times a day before meals  insulin lispro (ADMELOG) corrective regimen sliding scale   SubCutaneous at bedtime  insulin lispro Injectable (ADMELOG) 12 Unit(s) SubCutaneous three times a day before meals  ipratropium 17 MICROgram(s) HFA Inhaler 1 Puff(s) Inhalation every 6 hours  oxyCODONE  ER Tablet 80 milliGRAM(s) Oral every 12 hours  polyethylene glycol 3350 17 Gram(s) Oral daily  senna 2 Tablet(s) Oral at bedtime  sodium chloride 0.9% lock flush 3 milliLiter(s) IV Push every 8 hours    MEDICATIONS  (PRN):  acetaminophen   Tablet .. 650 milliGRAM(s) Oral every 6 hours PRN Temp greater or equal to 38C (100.4F), Mild Pain (1 - 3)  ALBUTerol    90 MICROgram(s) HFA Inhaler 2 Puff(s) Inhalation every 6 hours PRN Shortness of Breath and/or Wheezing  ondansetron    Tablet 4 milliGRAM(s) Oral every 6 hours PRN Nausea  oxyCODONE    IR 7.5 milliGRAM(s) Oral every 8 hours PRN moderateand sever pain      Vital Signs Last 24 Hrs  T(C): 36.8 (12 Mar 2021 03:46), Max: 37.2 (12 Mar 2021 01:49)  T(F): 98.2 (12 Mar 2021 03:46), Max: 98.9 (12 Mar 2021 01:49)  HR: 82 (12 Mar 2021 05:08) (76 - 90)  BP: 119/50 (12 Mar 2021 05:08) (110/60 - 180/60)  BP(mean): --  RR: 20 (12 Mar 2021 03:46) (16 - 20)  SpO2: 99% (12 Mar 2021 03:46) (96% - 99%)  CAPILLARY BLOOD GLUCOSE      POCT Blood Glucose.: 120 mg/dL (12 Mar 2021 08:27)  POCT Blood Glucose.: 132 mg/dL (12 Mar 2021 04:01)  POCT Blood Glucose.: 212 mg/dL (11 Mar 2021 22:21)  POCT Blood Glucose.: 145 mg/dL (11 Mar 2021 20:16)  POCT Blood Glucose.: 67 mg/dL (11 Mar 2021 18:54)  POCT Blood Glucose.: 80 mg/dL (11 Mar 2021 14:02)    I&O's Summary      PHYSICAL EXAM:  GENERAL: NAD, well-developed  HEAD:  Atraumatic, Normocephalic  EYES: EOMI, PERRLA, conjunctiva and sclera clear  NECK: Supple, No JVD  CHEST/LUNG: Clear to auscultation bilaterally; No wheeze  HEART: Regular rate and rhythm; No murmurs, rubs, or gallops  ABDOMEN: Soft, Nontender, Nondistended; Bowel sounds present  EXTREMITIES:  2+ Peripheral Pulses, No clubbing, cyanosis, or edema  PSYCH: AAOx3  NEUROLOGY: non-focal  SKIN: No rashes or lesions    LABS:                        11.8   10.28 )-----------( 228      ( 12 Mar 2021 08:52 )             38.0     03-12    140  |  100  |  27<H>  ----------------------------<  123<H>  4.3   |  27  |  1.70<H>    Ca    9.5      12 Mar 2021 08:52  Phos  3.3     03-12  Mg     1.9     03-12    TPro  8.0  /  Alb  4.2  /  TBili  0.7  /  DBili  x   /  AST  26  /  ALT  19  /  AlkPhos  116  03-11    PT/INR - ( 11 Mar 2021 18:11 )   PT: 12.8 sec;   INR: 1.12 ratio         PTT - ( 11 Mar 2021 18:11 )  PTT:26.9 sec          RADIOLOGY & ADDITIONAL TESTS:    Imaging Personally Reviewed:    Consultant(s) Notes Reviewed:      Care Discussed with Consultants/Other Providers:   Patient is a 65y old  Female who presents with a chief complaint of Worsening R foot ulcer x 1 day (12 Mar 2021 08:41)      SUBJECTIVE / OVERNIGHT EVENTS: patient seen and examined by bedside, , pt reports mild dizziness , pt asking for humidified o2 as she is feeling dry  and gets nose bleeding       MEDICATIONS  (STANDING):  aspirin enteric coated 81 milliGRAM(s) Oral daily  atorvastatin 80 milliGRAM(s) Oral at bedtime  budesonide 160 MICROgram(s)/formoterol 4.5 MICROgram(s) Inhaler 2 Puff(s) Inhalation two times a day  cefepime   IVPB 2000 milliGRAM(s) IV Intermittent every 12 hours  cholecalciferol 2000 Unit(s) Oral daily  clonazePAM  Tablet 0.5 milliGRAM(s) Oral at bedtime  dextrose 40% Gel 15 Gram(s) Oral once  dextrose 5%. 1000 milliLiter(s) (50 mL/Hr) IV Continuous <Continuous>  dextrose 5%. 1000 milliLiter(s) (100 mL/Hr) IV Continuous <Continuous>  dextrose 50% Injectable 25 Gram(s) IV Push once  dextrose 50% Injectable 12.5 Gram(s) IV Push once  dextrose 50% Injectable 25 Gram(s) IV Push once  furosemide    Tablet 40 milliGRAM(s) Oral daily  glucagon  Injectable 1 milliGRAM(s) IntraMuscular once  heparin   Injectable 5000 Unit(s) SubCutaneous every 8 hours  insulin detemir injectable (LEVEMIR) 64 Unit(s) SubCutaneous two times a day  insulin lispro (ADMELOG) corrective regimen sliding scale   SubCutaneous three times a day before meals  insulin lispro (ADMELOG) corrective regimen sliding scale   SubCutaneous at bedtime  insulin lispro Injectable (ADMELOG) 12 Unit(s) SubCutaneous three times a day before meals  ipratropium 17 MICROgram(s) HFA Inhaler 1 Puff(s) Inhalation every 6 hours  oxyCODONE  ER Tablet 80 milliGRAM(s) Oral every 12 hours  polyethylene glycol 3350 17 Gram(s) Oral daily  senna 2 Tablet(s) Oral at bedtime  sodium chloride 0.9% lock flush 3 milliLiter(s) IV Push every 8 hours    MEDICATIONS  (PRN):  acetaminophen   Tablet .. 650 milliGRAM(s) Oral every 6 hours PRN Temp greater or equal to 38C (100.4F), Mild Pain (1 - 3)  ALBUTerol    90 MICROgram(s) HFA Inhaler 2 Puff(s) Inhalation every 6 hours PRN Shortness of Breath and/or Wheezing  ondansetron    Tablet 4 milliGRAM(s) Oral every 6 hours PRN Nausea  oxyCODONE    IR 7.5 milliGRAM(s) Oral every 8 hours PRN moderateand sever pain      Vital Signs Last 24 Hrs  T(C): 36.8 (12 Mar 2021 03:46), Max: 37.2 (12 Mar 2021 01:49)  T(F): 98.2 (12 Mar 2021 03:46), Max: 98.9 (12 Mar 2021 01:49)  HR: 82 (12 Mar 2021 05:08) (76 - 90)  BP: 119/50 (12 Mar 2021 05:08) (110/60 - 180/60)  BP(mean): --  RR: 20 (12 Mar 2021 03:46) (16 - 20)  SpO2: 99% (12 Mar 2021 03:46) (96% - 99%)  CAPILLARY BLOOD GLUCOSE      POCT Blood Glucose.: 120 mg/dL (12 Mar 2021 08:27)  POCT Blood Glucose.: 132 mg/dL (12 Mar 2021 04:01)  POCT Blood Glucose.: 212 mg/dL (11 Mar 2021 22:21)  POCT Blood Glucose.: 145 mg/dL (11 Mar 2021 20:16)  POCT Blood Glucose.: 67 mg/dL (11 Mar 2021 18:54)  POCT Blood Glucose.: 80 mg/dL (11 Mar 2021 14:02)    I&O's Summary      PHYSICAL EXAM:  GENERAL: NAD, morbidly obese ,lying in bed with O2 via  NC   HEAD:  Atraumatic, Normocephalic  EYES: EOMI, PERRLA, conjunctiva and sclera clear  CHEST/LUNG: Clear to auscultation bilaterally; No wheeze  HEART: Regular rate and rhythm;   ABDOMEN: Soft, Nontender, Nondistended; Bowel sounds present  EXTREMITIES:  2+ Peripheral Pulses, , B/L LE swelling, R LE > L LE, erythema present B/L   PSYCH: AAOx3  NEUROLOGY: non-focal  SKIN: Rt foot with dressing     LABS:                        11.8   10.28 )-----------( 228      ( 12 Mar 2021 08:52 )             38.0     03-12    140  |  100  |  27<H>  ----------------------------<  123<H>  4.3   |  27  |  1.70<H>    Ca    9.5      12 Mar 2021 08:52  Phos  3.3     03-12  Mg     1.9     03-12    TPro  8.0  /  Alb  4.2  /  TBili  0.7  /  DBili  x   /  AST  26  /  ALT  19  /  AlkPhos  116  03-11    PT/INR - ( 11 Mar 2021 18:11 )   PT: 12.8 sec;   INR: 1.12 ratio         PTT - ( 11 Mar 2021 18:11 )  PTT:26.9 sec          RADIOLOGY & ADDITIONAL TESTS:  < from: US Duplex Venous Lower Ext Complete, Bilateral (03.12.21 @ 11:52) >  LEFT:  Normal compressibility of the LEFT common femoral, and femoral veins. The left popliteal vein is not visualized.  Doppler examination shows normal spontaneous and phasic flow.  The left calf veins are not visualized.    IMPRESSION:  Limited examination.  No evidence of deep venous thrombosis in either lower extremity.    < end of copied text >    Imaging Personally Reviewed:    Consultant(s) Notes Reviewed:      Care Discussed with Consultants/Other Providers:

## 2021-03-12 NOTE — PROGRESS NOTE ADULT - ASSESSMENT
65 F RF wound to SuB cellulitis to ankle  - pt seen and evaluated  - afebrile, vitals stable, WBC 10.5, ESR/CRP pending  -RFXR: Soft tissue swelling. Plantar hindfoot region shallow soft tissue ulceration again noted. No tracking gas collections beyond the ulcer margins and no gross radiographic evidence for underlying osteomyelitis.  - right foot plantar wound to SubQ, no purulence, no drainage, no malodor, no fluctuance, 3+ pitting edema, cellulitis to ankle, etiology diabetic neuropathy  - wound culture pending  - continue IV Vanco/Cefepime  - pod plan pending MRI  - discussed w/ attending

## 2021-03-12 NOTE — PHYSICAL THERAPY INITIAL EVALUATION ADULT - MANUAL MUSCLE TESTING RESULTS, REHAB EVAL
Pt. demonstrates grossly 3/5 throughout bilateral UE to available ROM, grossly 3-/5 throughout Left LE to available ROM, grossly 2+/5 throughout Right LE to available ROM, no formal MMT performed to Right ankle

## 2021-03-12 NOTE — PROGRESS NOTE ADULT - PROBLEM SELECTOR PLAN 1
-Continue Cefepime 2g IV Q12*.   -Pt received Vanco 1 g IV in the Ed. Per pharmacy: dose Vancomycin pending Vanco trough in AM.   - F/U B/L LE Duplex.  - F/U Wound Cx & Blood Cx  - F/U MRI w/ wo of R foot.  - F/U PT, SW and wound care assessment. -Continue Cefepime 2g IV Q12*.   -c/w renally dosed vanco and cefepime    -  B/L LE Duplex negative for DVT   - F/U Wound Cx & Blood Cx  - F/U MRI w/ wo of R foot.  - F/U PT, SW and wound care assessment.

## 2021-03-12 NOTE — PHYSICAL THERAPY INITIAL EVALUATION ADULT - RANGE OF MOTION EXAMINATION, REHAB EVAL
bilateral upper extremity ROM was WFL (within functional limits)/bilateral lower extremity ROM was WFL (within functional limits)/deficits as listed below bilateral upper extremity ROM was WFL (within functional limits)/bilateral lower extremity ROM was WFL (within functional limits)

## 2021-03-12 NOTE — PROGRESS NOTE ADULT - PROBLEM SELECTOR PLAN 2
Likely Opoid induced.  Last BM 3/10 consider small amount.  F/U TSH  Continue Miralax po QHS.   Senna 2 tabs QHS added to medication regimen. Likely Opoid induced.  Last BM 3/10 consider small amount.  TSH WNL   Continue Miralax po QHS.   Senna 2 tabs QHS added to medication regimen.

## 2021-03-13 ENCOUNTER — TRANSCRIPTION ENCOUNTER (OUTPATIENT)
Age: 66
End: 2021-03-13

## 2021-03-13 LAB
-  AMIKACIN: SIGNIFICANT CHANGE UP
-  AMOXICILLIN/CLAVULANIC ACID: SIGNIFICANT CHANGE UP
-  AMPICILLIN/SULBACTAM: SIGNIFICANT CHANGE UP
-  AMPICILLIN: SIGNIFICANT CHANGE UP
-  AZTREONAM: SIGNIFICANT CHANGE UP
-  CEFAZOLIN: SIGNIFICANT CHANGE UP
-  CEFEPIME: SIGNIFICANT CHANGE UP
-  CEFOXITIN: SIGNIFICANT CHANGE UP
-  CEFTRIAXONE: SIGNIFICANT CHANGE UP
-  CIPROFLOXACIN: SIGNIFICANT CHANGE UP
-  CLINDAMYCIN: SIGNIFICANT CHANGE UP
-  ERTAPENEM: SIGNIFICANT CHANGE UP
-  ERYTHROMYCIN: SIGNIFICANT CHANGE UP
-  GENTAMICIN: SIGNIFICANT CHANGE UP
-  IMIPENEM: SIGNIFICANT CHANGE UP
-  LEVOFLOXACIN: SIGNIFICANT CHANGE UP
-  MEROPENEM: SIGNIFICANT CHANGE UP
-  OXACILLIN: SIGNIFICANT CHANGE UP
-  PENICILLIN: SIGNIFICANT CHANGE UP
-  PIPERACILLIN/TAZOBACTAM: SIGNIFICANT CHANGE UP
-  RIFAMPIN: SIGNIFICANT CHANGE UP
-  TETRACYCLINE: SIGNIFICANT CHANGE UP
-  TOBRAMYCIN: SIGNIFICANT CHANGE UP
-  TRIMETHOPRIM/SULFAMETHOXAZOLE: SIGNIFICANT CHANGE UP
-  VANCOMYCIN: SIGNIFICANT CHANGE UP
ANION GAP SERPL CALC-SCNC: 14 MMOL/L — SIGNIFICANT CHANGE UP (ref 7–14)
BUN SERPL-MCNC: 31 MG/DL — HIGH (ref 7–23)
CALCIUM SERPL-MCNC: 9.7 MG/DL — SIGNIFICANT CHANGE UP (ref 8.4–10.5)
CHLORIDE SERPL-SCNC: 99 MMOL/L — SIGNIFICANT CHANGE UP (ref 98–107)
CO2 SERPL-SCNC: 26 MMOL/L — SIGNIFICANT CHANGE UP (ref 22–31)
CREAT SERPL-MCNC: 1.57 MG/DL — HIGH (ref 0.5–1.3)
CRP SERPL-MCNC: 60.9 MG/L — HIGH
GLUCOSE BLDC GLUCOMTR-MCNC: 152 MG/DL — HIGH (ref 70–99)
GLUCOSE BLDC GLUCOMTR-MCNC: 177 MG/DL — HIGH (ref 70–99)
GLUCOSE BLDC GLUCOMTR-MCNC: 181 MG/DL — HIGH (ref 70–99)
GLUCOSE BLDC GLUCOMTR-MCNC: 222 MG/DL — HIGH (ref 70–99)
GLUCOSE SERPL-MCNC: 177 MG/DL — HIGH (ref 70–99)
HCT VFR BLD CALC: 38.9 % — SIGNIFICANT CHANGE UP (ref 34.5–45)
HGB BLD-MCNC: 11.8 G/DL — SIGNIFICANT CHANGE UP (ref 11.5–15.5)
MAGNESIUM SERPL-MCNC: 2.1 MG/DL — SIGNIFICANT CHANGE UP (ref 1.6–2.6)
MCHC RBC-ENTMCNC: 27.9 PG — SIGNIFICANT CHANGE UP (ref 27–34)
MCHC RBC-ENTMCNC: 30.3 GM/DL — LOW (ref 32–36)
MCV RBC AUTO: 92 FL — SIGNIFICANT CHANGE UP (ref 80–100)
METHOD TYPE: SIGNIFICANT CHANGE UP
NRBC # BLD: 0 /100 WBCS — SIGNIFICANT CHANGE UP
NRBC # FLD: 0 K/UL — SIGNIFICANT CHANGE UP
PHOSPHATE SERPL-MCNC: 3.4 MG/DL — SIGNIFICANT CHANGE UP (ref 2.5–4.5)
PLATELET # BLD AUTO: 242 K/UL — SIGNIFICANT CHANGE UP (ref 150–400)
POTASSIUM SERPL-MCNC: 4.6 MMOL/L — SIGNIFICANT CHANGE UP (ref 3.5–5.3)
POTASSIUM SERPL-SCNC: 4.6 MMOL/L — SIGNIFICANT CHANGE UP (ref 3.5–5.3)
RBC # BLD: 4.23 M/UL — SIGNIFICANT CHANGE UP (ref 3.8–5.2)
RBC # FLD: 13.6 % — SIGNIFICANT CHANGE UP (ref 10.3–14.5)
SODIUM SERPL-SCNC: 139 MMOL/L — SIGNIFICANT CHANGE UP (ref 135–145)
VANCOMYCIN FLD-MCNC: 9.2 UG/ML — SIGNIFICANT CHANGE UP
WBC # BLD: 10.09 K/UL — SIGNIFICANT CHANGE UP (ref 3.8–10.5)
WBC # FLD AUTO: 10.09 K/UL — SIGNIFICANT CHANGE UP (ref 3.8–10.5)

## 2021-03-13 PROCEDURE — 99223 1ST HOSP IP/OBS HIGH 75: CPT

## 2021-03-13 PROCEDURE — 99232 SBSQ HOSP IP/OBS MODERATE 35: CPT

## 2021-03-13 RX ORDER — VANCOMYCIN HCL 1 G
1000 VIAL (EA) INTRAVENOUS ONCE
Refills: 0 | Status: COMPLETED | OUTPATIENT
Start: 2021-03-13 | End: 2021-03-13

## 2021-03-13 RX ORDER — NYSTATIN CREAM 100000 [USP'U]/G
1 CREAM TOPICAL
Refills: 0 | Status: DISCONTINUED | OUTPATIENT
Start: 2021-03-13 | End: 2021-03-16

## 2021-03-13 RX ADMIN — Medication 12 UNIT(S): at 08:39

## 2021-03-13 RX ADMIN — CEFEPIME 100 MILLIGRAM(S): 1 INJECTION, POWDER, FOR SOLUTION INTRAMUSCULAR; INTRAVENOUS at 17:18

## 2021-03-13 RX ADMIN — OXYCODONE HYDROCHLORIDE 7.5 MILLIGRAM(S): 5 TABLET ORAL at 06:25

## 2021-03-13 RX ADMIN — Medication 1 PUFF(S): at 11:40

## 2021-03-13 RX ADMIN — SODIUM CHLORIDE 3 MILLILITER(S): 9 INJECTION INTRAMUSCULAR; INTRAVENOUS; SUBCUTANEOUS at 14:05

## 2021-03-13 RX ADMIN — SODIUM CHLORIDE 3 MILLILITER(S): 9 INJECTION INTRAMUSCULAR; INTRAVENOUS; SUBCUTANEOUS at 05:30

## 2021-03-13 RX ADMIN — OXYCODONE HYDROCHLORIDE 80 MILLIGRAM(S): 5 TABLET ORAL at 21:37

## 2021-03-13 RX ADMIN — OXYCODONE HYDROCHLORIDE 7.5 MILLIGRAM(S): 5 TABLET ORAL at 07:25

## 2021-03-13 RX ADMIN — CEFEPIME 100 MILLIGRAM(S): 1 INJECTION, POWDER, FOR SOLUTION INTRAMUSCULAR; INTRAVENOUS at 05:32

## 2021-03-13 RX ADMIN — OXYCODONE HYDROCHLORIDE 80 MILLIGRAM(S): 5 TABLET ORAL at 12:40

## 2021-03-13 RX ADMIN — NYSTATIN CREAM 1 APPLICATION(S): 100000 CREAM TOPICAL at 18:10

## 2021-03-13 RX ADMIN — SENNA PLUS 2 TABLET(S): 8.6 TABLET ORAL at 21:38

## 2021-03-13 RX ADMIN — BUDESONIDE AND FORMOTEROL FUMARATE DIHYDRATE 2 PUFF(S): 160; 4.5 AEROSOL RESPIRATORY (INHALATION) at 21:38

## 2021-03-13 RX ADMIN — Medication 81 MILLIGRAM(S): at 11:41

## 2021-03-13 RX ADMIN — OXYCODONE HYDROCHLORIDE 7.5 MILLIGRAM(S): 5 TABLET ORAL at 19:15

## 2021-03-13 RX ADMIN — Medication 250 MILLIGRAM(S): at 17:18

## 2021-03-13 RX ADMIN — HEPARIN SODIUM 5000 UNIT(S): 5000 INJECTION INTRAVENOUS; SUBCUTANEOUS at 14:18

## 2021-03-13 RX ADMIN — Medication 2: at 12:47

## 2021-03-13 RX ADMIN — SODIUM CHLORIDE 3 MILLILITER(S): 9 INJECTION INTRAMUSCULAR; INTRAVENOUS; SUBCUTANEOUS at 21:37

## 2021-03-13 RX ADMIN — Medication 12 UNIT(S): at 12:48

## 2021-03-13 RX ADMIN — Medication 0.5 MILLIGRAM(S): at 21:37

## 2021-03-13 RX ADMIN — Medication 64 UNIT(S): at 22:40

## 2021-03-13 RX ADMIN — ATORVASTATIN CALCIUM 80 MILLIGRAM(S): 80 TABLET, FILM COATED ORAL at 21:38

## 2021-03-13 RX ADMIN — BUDESONIDE AND FORMOTEROL FUMARATE DIHYDRATE 2 PUFF(S): 160; 4.5 AEROSOL RESPIRATORY (INHALATION) at 08:46

## 2021-03-13 RX ADMIN — Medication 1 PUFF(S): at 21:39

## 2021-03-13 RX ADMIN — Medication 64 UNIT(S): at 08:51

## 2021-03-13 RX ADMIN — Medication 1 PUFF(S): at 16:23

## 2021-03-13 RX ADMIN — HEPARIN SODIUM 5000 UNIT(S): 5000 INJECTION INTRAVENOUS; SUBCUTANEOUS at 21:38

## 2021-03-13 RX ADMIN — Medication 1 PUFF(S): at 04:18

## 2021-03-13 RX ADMIN — OXYCODONE HYDROCHLORIDE 80 MILLIGRAM(S): 5 TABLET ORAL at 11:40

## 2021-03-13 RX ADMIN — HEPARIN SODIUM 5000 UNIT(S): 5000 INJECTION INTRAVENOUS; SUBCUTANEOUS at 05:33

## 2021-03-13 RX ADMIN — Medication 2000 UNIT(S): at 11:42

## 2021-03-13 RX ADMIN — Medication 12 UNIT(S): at 17:17

## 2021-03-13 RX ADMIN — OXYCODONE HYDROCHLORIDE 7.5 MILLIGRAM(S): 5 TABLET ORAL at 18:13

## 2021-03-13 RX ADMIN — Medication 2: at 17:17

## 2021-03-13 RX ADMIN — ALBUTEROL 2 PUFF(S): 90 AEROSOL, METERED ORAL at 08:46

## 2021-03-13 RX ADMIN — Medication 2: at 08:41

## 2021-03-13 RX ADMIN — POLYETHYLENE GLYCOL 3350 17 GRAM(S): 17 POWDER, FOR SOLUTION ORAL at 11:41

## 2021-03-13 NOTE — DISCHARGE NOTE PROVIDER - HOSPITAL COURSE
Cellulitis of right foot.     -Continue Cefepime 2g IV Q12*.   - Vanc by level  -Abx switched to Keflex and Levaquin on dc  - B/L LE Duplex negative   -Wound Cx: multiple organisms   - Blood Cx: NTD   - MRI w/ wo of R foot - no OM noted   - PT: rehab     Constipation, unspecified constipation type.   -Likely Opoid induced.  -Last BM 3/10 consider small amount.  -Continue Miralax po QHS.   -Senna 2 tabs QHS added to medication regimen.     Type 2 diabetes mellitus with stage 3 chronic kidney disease, with long-term current use of insulin, unspecified whether stage 3a or 3b CKD.   - A1C: 8.5   - DM diet.  - Continue Levemir and Humalog @ 80% home dose.  - FS QID  - ISS.     Essential hypertension  -Low salt diet.   -Pt no longer on Lisinopril due to CKD.  -S/p Norvasc 5 mg po- stable off meds      Spinal stenosis of lumbar region, unspecified whether neurogenic claudication present.   -Continue pain control.   -PT eval: rehab     Vitamin D deficiency.   -Continue Vit D 2000 U po daily.    Stage 3a chronic kidney disease.    -Creatinine improved fro 1.9 to 1.5.  -Pt no longer on Lisinopril.    Congestive heart failure, unspecified HF chronicity, unspecified heart failure type.    -Low salt diet.  -Continue Lasix 40 mg po daily.     Moderate asthma, unspecified whether complicated, unspecified whether persistent.   -Continue inhalers.     Need for prophylactic measure.  -VTE with heparin 5000U sub cut Q8*  -Fall, Aspiration, safety, seizure precautions.   -PT: rehab      65 F admitted for R LE cellulitis secondary to DM ulcer. Pt is on Cefepime & Vanco.   Hx CDK3, DM2. 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      Cellulitis of right foot.     -Continue Cefepime 2g IV Q12*.   - Vanc by level  -Abx switched to Keflex and Levaquin on dc  - B/L LE Duplex negative   -Wound Cx: multiple organisms   - Blood Cx: NTD   - MRI w/ wo of R foot - no OM noted   - PT: rehab     Constipation, unspecified constipation type.   -Likely Opoid induced.  -Last BM 3/10 consider small amount.  -Continue Miralax po QHS.   -Senna 2 tabs QHS added to medication regimen.     Type 2 diabetes mellitus with stage 3 chronic kidney disease, with long-term current use of insulin, unspecified whether stage 3a or 3b CKD.   - A1C: 8.5   - DM diet.  - Continue Levemir and Humalog @ 80% home dose.  - FS QID  - ISS.     Essential hypertension  -Low salt diet.   -Pt no longer on Lisinopril due to CKD.  -S/p Norvasc 5 mg po- stable off meds      Spinal stenosis of lumbar region, unspecified whether neurogenic claudication present.   -Continue pain control.   -PT eval: rehab     Vitamin D deficiency.   -Continue Vit D 2000 U po daily.    Stage 3a chronic kidney disease.    -Creatinine improved fro 1.9 to 1.5.  -Pt no longer on Lisinopril.    Congestive heart failure, unspecified HF chronicity, unspecified heart failure type.    -Low salt diet.  -Continue Lasix 40 mg po daily.     Moderate asthma, unspecified whether complicated, unspecified whether persistent.   -Continue inhalers.     Need for prophylactic measure.  -VTE with heparin 5000U sub cut Q8*  -Fall, Aspiration, safety, seizure precautions.   -PT: rehab      65 F admitted for R LE cellulitis secondary to DM ulcer. Pt is on Cefepime & Vanco.   Hx CDK3, DM2. 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      Cellulitis of right foot.     -Continue Cefepime 2g IV Q12*.   - Vanc by level  -Abx switched to Keflex and Levaquin on dc  - B/L LE Duplex negative   -Wound Cx: multiple organisms   - Blood Cx: NTD   - MRI w/ wo of R foot - no OM noted   - PT: rehab pt refused wants to be home , home care arranged     Constipation, unspecified constipation type.   -Likely Opoid induced.  -Last BM 3/10 consider small amount.  -Continue Miralax po QHS.   -Senna 2 tabs QHS added to medication regimen.     Type 2 diabetes mellitus with stage 3   - A1C: 8.5   - FS QID  - ISS.     Essential hypertension  -Low salt diet.   -Pt no longer on Lisinopril due to CKD.  -S/p Norvasc 5 mg po- stable off meds      Spinal stenosis of lumbar region  -Continue pain control.   -PT eval: rehab     Vitamin D deficiency.   -Continue Vit D 2000 U po daily.    Stage 3a chronic kidney disease.    -Creatinine improved fro 1.9 to 1.5.  -Pt no longer on Lisinopril.    Congestive heart failure, unspecified HF chronicity  -Low salt diet.  -Continue Lasix 40 mg po daily.     Moderate asthma,  -Continue inhalers, asymotomatic

## 2021-03-13 NOTE — PROGRESS NOTE ADULT - PROBLEM SELECTOR PLAN 7
Creatinine likely at baseline   Pt no longer on Lisinopril.  Monitor BMP.  Avoid nephrotoxic agents.

## 2021-03-13 NOTE — DISCHARGE NOTE PROVIDER - CARE PROVIDER_API CALL
Dr. Riley, Podiatry,   Phone: [phone]  Fax: (   )    -  Follow Up Time:     PCP,   Phone: (   )    -  Fax: (   )    -  Follow Up Time:

## 2021-03-13 NOTE — PROGRESS NOTE ADULT - SUBJECTIVE AND OBJECTIVE BOX
Podiatry pager #: [phone] ([place])/ [phone] ([facility])    Patient is a 65y old  Female who presents with a chief complaint of Worsening R foot ulcer x 1 day (12 Mar 2021 11:06)       INTERVAL HPI/OVERNIGHT EVENTS:  Patient seen and evaluated at bedside.  Pt is resting comfortable in NAD. Denies N/V/F/C.     Allergies    adhesives (Rash)  Bactrim ([place])  latex (Rash)  wool- rash, itch (Other)    Intolerances        Vital Signs Last 24 Hrs  T(C): 36.7 (13 Mar 2021 05:29), Max: 36.8 (12 Mar 2021 17:20)  T(F): 98.1 (13 Mar 2021 05:29), Max: 98.2 (12 Mar 2021 17:20)  HR: 71 (13 Mar 2021 05:29) (71 - 83)  BP: 109/50 (13 Mar 2021 05:29) (109/50 - 123/50)  BP(mean): --  RR: 18 (13 Mar 2021 05:29) (18 - 18)  SpO2: 98% (13 Mar 2021 05:29) (98% - 99%)    LABS:                        11.8   10.28 )-----------( 228      ( 12 Mar 2021 08:52 )             38.0     03-12    140  |  100  |  27<H>  ----------------------------<  123<H>  4.3   |  27  |  1.70<H>    Ca    9.5      12 Mar 2021 08:52  Phos  3.3     03-12  Mg     1.9     03-12    TPro  8.0  /  Alb  4.2  /  TBili  0.7  /  DBili  x   /  AST  26  /  ALT  19  /  AlkPhos  116  03-11    PT/INR - ( 11 Mar 2021 18:11 )   PT: 12.8 sec;   INR: 1.12 ratio         PTT - ( 11 Mar 2021 18:11 )  PTT:26.9 sec    CAPILLARY BLOOD GLUCOSE      POCT Blood Glucose.: 210 mg/dL (12 Mar 2021 22:09)  POCT Blood Glucose.: 158 mg/dL (12 Mar 2021 17:09)  POCT Blood Glucose.: 151 mg/dL (12 Mar 2021 12:42)  POCT Blood Glucose.: 120 mg/dL (12 Mar 2021 08:27)      Lower Extremity Physical Exam:  Lower Extremity Physical Exam:  Vascular: DP/PT 0/4 B/L d/y edema, CFT <3 seconds right, > 5 seconds left, Temperature gradient warm to warm B/L  Neuro: Epicritic sensation dimished to the level of ankle B/L  Musculoskeletal/Ortho: unremarkable  Skin:  Wound #1: right foot plantar wound to SubQ, no purulence, no drainage, no malodor, no fluctuance, 3+ pitting edema, cellulitis to ankle, etiology diabetic neuropathy    < from: MR Foot w/wo IV Cont, Right (03.12.21 @ 16:12) >    EXAM:  MR FOOT WAW IC RT        PROCEDURE DATE:  Mar 12 2021         INTERPRETATION:  MR RIGHT HINDFOOT/ANKLE WITHOUT AND WITH IV CONTRAST    HISTORY:  Diabetes. Osteomyelitis is suspected. Pain and swelling of the hindfoot/midfoot.    TECHNIQUE:  Multiplanar MRI of the right hindfoot/ankle was performed without contrast using 7 sequences.    COMPARISON:  Right foot x-rays dated March 11, 2021    FINDINGS:    OSSEOUS STRUCTURES    Charcot Deformity:Charcot changes of the hindfoot/midfoot are again seen with osseous fragmentation, chronic erosive changes, subchondral reaction, and osseous productive changes. Talar head and neck are fragmented and superiorly displaced.    Marrow:  There is edema involving the fourth metatarsal head with dorsal predominance. Mild edema also involves the plantar fifth metatarsal head.    Tarsal Coalition:  None.    LIGAMENTS    Talofibular/Calcaneofibular Ligaments:  Intact although the anterior talus is fragmented.    Tibiofibular/Syndesmotic Ligaments:  Intact.    Medial Deltoid Ligament Complex:  Poorly visualized.    Subtalar Ligaments:  Poorly visualized.    Lisfranc Ligament: Intact.    TENDONS    Posterior Tibialis/Medial Flexor Tendons:  Intact.    Peroneal Tendons:  Intact.    Extensor Tendons: Intact.    ACHILLES TENDON  Mild tendinopathy is present.    PLANTAR FASCIA  Intact.    JOINTS    Tibiotalar Joint:  Small marginal osteophytes are present and there is widening of the superomedial mortise with mild anterior displacement of the talus.    Subtalar Joints:  Small to moderate-sized osteophytes are present with anterior displacement of the talus and Charcot changes of the middle and anterior subtalar joints.    Intertarsal Joints:  There are extensive Charcot changes.    Tarsometatarsal Joints:  Small scattered marginal osteophytes are present.    Metatarsophalangeal Joints:  Preserved.    SOFT TISSUES    Masses/Cysts:  None.    Musculature:  Extensive muscle atrophy is consistent with diabetic neuropathy.    Subcutaneous Tissues:  Moderate to severe subcutaneous edema is present. No abscess is appreciated. Tiny focus of metallic susceptibility artifact is seen along the cutaneous surface at the level of the fourth metatarsal base as illustrated on sagittal image 24. Plantarcutaneous wounds appear to be present along the midfoot.    NEUROVASCULAR STRUCTURES    Tarsal Tunnel:  Preserved.    IMPRESSION:  1. Charcot changes of the midfoot and hindfoot are again seen.  2. Plantar cutaneous wounds appear to be present along the midfoot without underlying osteomyelitis or abscess.  3. Marrow edema of the fourth and fifth metatarsal heads is presumably reactive or stress related in the absence of nearby soft tissue wounds.              TAINA MARTINEZ MD; Attending Radiologist  This document has been electronically signed. Mar 12 2021  4:38PM    < end of copied text >

## 2021-03-13 NOTE — DISCHARGE NOTE PROVIDER - NSDCFUADDINST_GEN_ALL_CORE_FT
Podiatry Discharge Instructions:  Follow up: Please follow up with Dr. Riley within 1 week of discharge from the hospital, please call [phone] for appointment and discuss that you recently were seen in the hospital.  Wound Care: Please apply daily aquacel, gauze, ABD pad, kerlix,ACE.  Weight bearing: Please weight bear as tolerated in a surgical shoe.  Antibiotics: Please continue as instructed.

## 2021-03-13 NOTE — PROGRESS NOTE ADULT - SUBJECTIVE AND OBJECTIVE BOX
Patient is a 65y old  Female who presents with a chief complaint of Worsening R foot ulcer x 1 day (13 Mar 2021 10:17)      SUBJECTIVE / OVERNIGHT EVENTS:    MEDICATIONS  (STANDING):  aspirin enteric coated 81 milliGRAM(s) Oral daily  atorvastatin 80 milliGRAM(s) Oral at bedtime  budesonide 160 MICROgram(s)/formoterol 4.5 MICROgram(s) Inhaler 2 Puff(s) Inhalation two times a day  cefepime   IVPB 2000 milliGRAM(s) IV Intermittent every 12 hours  cholecalciferol 2000 Unit(s) Oral daily  clonazePAM  Tablet 0.5 milliGRAM(s) Oral at bedtime  dextrose 40% Gel 15 Gram(s) Oral once  dextrose 5%. 1000 milliLiter(s) (50 mL/Hr) IV Continuous <Continuous>  dextrose 5%. 1000 milliLiter(s) (100 mL/Hr) IV Continuous <Continuous>  dextrose 50% Injectable 25 Gram(s) IV Push once  dextrose 50% Injectable 12.5 Gram(s) IV Push once  dextrose 50% Injectable 25 Gram(s) IV Push once  furosemide    Tablet 40 milliGRAM(s) Oral daily  glucagon  Injectable 1 milliGRAM(s) IntraMuscular once  heparin   Injectable 5000 Unit(s) SubCutaneous every 8 hours  insulin detemir injectable (LEVEMIR) 64 Unit(s) SubCutaneous two times a day  insulin lispro (ADMELOG) corrective regimen sliding scale   SubCutaneous three times a day before meals  insulin lispro (ADMELOG) corrective regimen sliding scale   SubCutaneous at bedtime  insulin lispro Injectable (ADMELOG) 12 Unit(s) SubCutaneous three times a day before meals  ipratropium 17 MICROgram(s) HFA Inhaler 1 Puff(s) Inhalation every 6 hours  oxyCODONE  ER Tablet 80 milliGRAM(s) Oral every 12 hours  polyethylene glycol 3350 17 Gram(s) Oral daily  senna 2 Tablet(s) Oral at bedtime  sodium chloride 0.9% lock flush 3 milliLiter(s) IV Push every 8 hours    MEDICATIONS  (PRN):  acetaminophen   Tablet .. 650 milliGRAM(s) Oral every 6 hours PRN Temp greater or equal to 38C (100.4F), Mild Pain (1 - 3)  ALBUTerol    90 MICROgram(s) HFA Inhaler 2 Puff(s) Inhalation every 6 hours PRN Shortness of Breath and/or Wheezing  ondansetron    Tablet 4 milliGRAM(s) Oral every 6 hours PRN Nausea  oxyCODONE    IR 7.5 milliGRAM(s) Oral every 8 hours PRN moderateand sever pain      Vital Signs Last 24 Hrs  T(C): 36.7 (13 Mar 2021 05:29), Max: 36.8 (12 Mar 2021 17:20)  T(F): 98.1 (13 Mar 2021 05:29), Max: 98.2 (12 Mar 2021 17:20)  HR: 71 (13 Mar 2021 05:29) (71 - 83)  BP: 109/50 (13 Mar 2021 05:29) (109/50 - 123/50)  BP(mean): --  RR: 18 (13 Mar 2021 05:29) (18 - 18)  SpO2: 98% (13 Mar 2021 05:29) (98% - 99%)  CAPILLARY BLOOD GLUCOSE      POCT Blood Glucose.: 181 mg/dL (13 Mar 2021 08:34)  POCT Blood Glucose.: 210 mg/dL (12 Mar 2021 22:09)  POCT Blood Glucose.: 158 mg/dL (12 Mar 2021 17:09)  POCT Blood Glucose.: 151 mg/dL (12 Mar 2021 12:42)    I&O's Summary      PHYSICAL EXAM:  GENERAL: NAD, well-developed  HEAD:  Atraumatic, Normocephalic  EYES: EOMI, PERRLA, conjunctiva and sclera clear  NECK: Supple, No JVD  CHEST/LUNG: Clear to auscultation bilaterally; No wheeze  HEART: Regular rate and rhythm; No murmurs, rubs, or gallops  ABDOMEN: Soft, Nontender, Nondistended; Bowel sounds present  EXTREMITIES:  2+ Peripheral Pulses, No clubbing, cyanosis, or edema  PSYCH: AAOx3  NEUROLOGY: non-focal  SKIN: No rashes or lesions    LABS:                        11.8   10.28 )-----------( 228      ( 12 Mar 2021 08:52 )             38.0     03-13    139  |  99  |  31<H>  ----------------------------<  177<H>  4.6   |  26  |  1.57<H>    Ca    9.7      13 Mar 2021 07:47  Phos  3.4     03-13  Mg     2.1     03-13    TPro  8.0  /  Alb  4.2  /  TBili  0.7  /  DBili  x   /  AST  26  /  ALT  19  /  AlkPhos  116  03-11    PT/INR - ( 11 Mar 2021 18:11 )   PT: 12.8 sec;   INR: 1.12 ratio         PTT - ( 11 Mar 2021 18:11 )  PTT:26.9 sec          RADIOLOGY & ADDITIONAL TESTS:    Imaging Personally Reviewed:    Consultant(s) Notes Reviewed:      Care Discussed with Consultants/Other Providers:   Patient is a 65y old  Female who presents with a chief complaint of Worsening R foot ulcer x 1 day (13 Mar 2021 10:17)      SUBJECTIVE / OVERNIGHT EVENTS: patient seen and examined by bedside, pt feeling better, pt says humidified Oxygen helping her, no bleeding from her nose , ,afebrile, denies headache, dizziness, SOB, CP, Palpitations , N/V/D, abdominal pain  pain in the foot controlled         MEDICATIONS  (STANDING):  aspirin enteric coated 81 milliGRAM(s) Oral daily  atorvastatin 80 milliGRAM(s) Oral at bedtime  budesonide 160 MICROgram(s)/formoterol 4.5 MICROgram(s) Inhaler 2 Puff(s) Inhalation two times a day  cefepime   IVPB 2000 milliGRAM(s) IV Intermittent every 12 hours  cholecalciferol 2000 Unit(s) Oral daily  clonazePAM  Tablet 0.5 milliGRAM(s) Oral at bedtime  dextrose 40% Gel 15 Gram(s) Oral once  dextrose 5%. 1000 milliLiter(s) (50 mL/Hr) IV Continuous <Continuous>  dextrose 5%. 1000 milliLiter(s) (100 mL/Hr) IV Continuous <Continuous>  dextrose 50% Injectable 25 Gram(s) IV Push once  dextrose 50% Injectable 12.5 Gram(s) IV Push once  dextrose 50% Injectable 25 Gram(s) IV Push once  furosemide    Tablet 40 milliGRAM(s) Oral daily  glucagon  Injectable 1 milliGRAM(s) IntraMuscular once  heparin   Injectable 5000 Unit(s) SubCutaneous every 8 hours  insulin detemir injectable (LEVEMIR) 64 Unit(s) SubCutaneous two times a day  insulin lispro (ADMELOG) corrective regimen sliding scale   SubCutaneous three times a day before meals  insulin lispro (ADMELOG) corrective regimen sliding scale   SubCutaneous at bedtime  insulin lispro Injectable (ADMELOG) 12 Unit(s) SubCutaneous three times a day before meals  ipratropium 17 MICROgram(s) HFA Inhaler 1 Puff(s) Inhalation every 6 hours  oxyCODONE  ER Tablet 80 milliGRAM(s) Oral every 12 hours  polyethylene glycol 3350 17 Gram(s) Oral daily  senna 2 Tablet(s) Oral at bedtime  sodium chloride 0.9% lock flush 3 milliLiter(s) IV Push every 8 hours    MEDICATIONS  (PRN):  acetaminophen   Tablet .. 650 milliGRAM(s) Oral every 6 hours PRN Temp greater or equal to 38C (100.4F), Mild Pain (1 - 3)  ALBUTerol    90 MICROgram(s) HFA Inhaler 2 Puff(s) Inhalation every 6 hours PRN Shortness of Breath and/or Wheezing  ondansetron    Tablet 4 milliGRAM(s) Oral every 6 hours PRN Nausea  oxyCODONE    IR 7.5 milliGRAM(s) Oral every 8 hours PRN moderateand sever pain      Vital Signs Last 24 Hrs  T(C): 36.7 (13 Mar 2021 05:29), Max: 36.8 (12 Mar 2021 17:20)  T(F): 98.1 (13 Mar 2021 05:29), Max: 98.2 (12 Mar 2021 17:20)  HR: 71 (13 Mar 2021 05:29) (71 - 83)  BP: 109/50 (13 Mar 2021 05:29) (109/50 - 123/50)  BP(mean): --  RR: 18 (13 Mar 2021 05:29) (18 - 18)  SpO2: 98% (13 Mar 2021 05:29) (98% - 99%)  CAPILLARY BLOOD GLUCOSE      POCT Blood Glucose.: 181 mg/dL (13 Mar 2021 08:34)  POCT Blood Glucose.: 210 mg/dL (12 Mar 2021 22:09)  POCT Blood Glucose.: 158 mg/dL (12 Mar 2021 17:09)  POCT Blood Glucose.: 151 mg/dL (12 Mar 2021 12:42)    I&O's Summary      PHYSICAL EXAM:  GENERAL: NAD, morbidly obese ,lying in bed with O2 via  NC   HEAD:  Atraumatic, Normocephalic  EYES: EOMI, PERRLA, conjunctiva and sclera clear  CHEST/LUNG: Clear to auscultation bilaterally; No wheeze  HEART: Regular rate and rhythm;   ABDOMEN: Soft, Nontender, Nondistended; Bowel sounds present  EXTREMITIES:  2+ Peripheral Pulses, , B/L LE swelling, R LE > L LE, erythema present B/L   PSYCH: AAOx3  NEUROLOGY: non-focal  SKIN: Rt foot with dressing           LABS:                        11.8   10.28 )-----------( 228      ( 12 Mar 2021 08:52 )             38.0     03-13    139  |  99  |  31<H>  ----------------------------<  177<H>  4.6   |  26  |  1.57<H>    Ca    9.7      13 Mar 2021 07:47  Phos  3.4     03-13  Mg     2.1     03-13    TPro  8.0  /  Alb  4.2  /  TBili  0.7  /  DBili  x   /  AST  26  /  ALT  19  /  AlkPhos  116  03-11    PT/INR - ( 11 Mar 2021 18:11 )   PT: 12.8 sec;   INR: 1.12 ratio         PTT - ( 11 Mar 2021 18:11 )  PTT:26.9 sec          RADIOLOGY & ADDITIONAL TESTS:  < from: MR Foot w/wo IV Cont, Right (03.12.21 @ 16:12) >  OSSEOUS STRUCTURES    Charcot Deformity:Charcot changes of the hindfoot/midfoot are again seen with osseous fragmentation, chronic erosive changes, subchondral reaction, and osseous productive changes. Talar head and neck are fragmented and superiorly displaced.    Marrow:  There is edema involving the fourth metatarsal head with dorsal predominance. Mild edema also involves the plantar fifth metatarsal head.    Tarsal Coalition:  None.    LIGAMENTS    Talofibular/Calcaneofibular Ligaments:  Intact although the anterior talus is fragmented.    Tibiofibular/Syndesmotic Ligaments:  Intact.    Medial Deltoid Ligament Complex:  Poorly visualized.    Subtalar Ligaments:  Poorly visualized.    Lisfranc Ligament: Intact.    TENDONS    Posterior Tibialis/Medial Flexor Tendons:  Intact.    Peroneal Tendons:  Intact.    Extensor Tendons: Intact.    ACHILLES TENDON  Mild tendinopathy is present.    PLANTAR FASCIA  Intact.    JOINTS    Tibiotalar Joint:  Small marginal osteophytes are present and there is widening of the superomedial mortise with mild anterior displacement of the talus.    Subtalar Joints:  Small to moderate-sized osteophytes are present with anterior displacement of the talus and Charcot changes of the middle and anterior subtalar joints.    Intertarsal Joints:  There are extensive Charcot changes.    Tarsometatarsal Joints:  Small scattered marginal osteophytes are present.    Metatarsophalangeal Joints:  Preserved.    SOFT TISSUES    Masses/Cysts:  None.    Musculature:  Extensive muscle atrophy is consistent with diabetic neuropathy.    Subcutaneous Tissues:  Moderate to severe subcutaneous edema is present. No abscess is appreciated. Tiny focus of metallic susceptibility artifact is seen along the cutaneous surface at the level of the fourth metatarsal base as illustrated on sagittal image 24. Plantarcutaneous wounds appear to be present along the midfoot.    NEUROVASCULAR STRUCTURES    Tarsal Tunnel:  Preserved.    IMPRESSION:  1. Charcot changes of the midfoot and hindfoot are again seen.  2. Plantar cutaneous wounds appear to be present along the midfoot without underlying osteomyelitis or abscess.  3. Marrow edema of the fourth and fifth metatarsal heads is presumably reactive or stress related in the absence of nearby soft tissue wounds.            < end of copied text >    Imaging Personally Reviewed:    Consultant(s) Notes Reviewed:  podiatry , ID     Care Discussed with Consultants/Other Providers:

## 2021-03-13 NOTE — DISCHARGE NOTE PROVIDER - PROVIDER TOKENS
FREE:[LAST:[Dr. Riley, Podiatry],PHONE:[phone]],FAX:[(   )    -]],FREE:[LAST:[PCP],PHONE:[(   )    -],FAX:[(   )    -]]

## 2021-03-13 NOTE — CONSULT NOTE ADULT - ASSESSMENT
ASSESSMENT:    RECOMMENDATIONS:  1. Rt Foot wound  -------  2. Abx allergy    MAXIME Mancia, MD  Fellow, Infectious Diseases  Pager: [phone]  If no response, after 5pm and on Weekends: Call [phone]   65/F with PMH morbid obesity (ambulates with walker d/t spinal stenosis), DM on insulin, HTN, HLD, HFpEF, CKD3, Asthma, Pulm HTN (on NC 3l/min), old LLE DVT no longer on A/C x 5 years, Ovarian Ca s/p 2010 MEGAN, insomnia, Vit D Deficiency, chronic RLE wounds with Charcot arthropathy  - H/o chronic RLE wounds - recent admissions Nov 2020 and Jan 2021 - s/p PO abx  - Sent in by visiting Podiatrist for concern for worsening RLE plantar ulcer. Ulcer lanced and drained at home (no cx taken). H/o chills night before admission  - In ED, AVSS. Labs revealed WBC 10,570  - Eval by Podiatry - rt foot plantar wound to SubQ, no purulence, no drainage, no malodor, cellulitis to ankle. Excisional debridement performed down to SUbQ. Wound culture taken. MRI negative for OM. ID consulted for recs  =======  Diabetic foot wound infection  - Pt has DM (HbA1c 8.5) with underlying Charcot arthropathy - prone to recurrent infections  - Wound cx showing S. aureus, K. pneumoniae, P. mirablis, S. marascens. Past cx with MSSA and Finegoldia  - MRI revealed Charcot Arthropathy with no concern for OM  - would continue IV empiric abx therapy for now, pending final wound cx. Once sensitivities obtained, anticipate switch to PO abx for 10-14 day course    Abx allergy: Reports receiving Bactrim approximately 3 years ago, f/b flushing and itching  - was on triple abx at that time. Reports having been admitted for those complaints    RECOMMENDATIONS:  #PENDING RECS. PLEASE WAIT FOR FINAL RECS AFTER DISCUSSION WITH ATTENDING#  1. Rt Foot wound  - continue Cefepime 2g IV q12h  - Hold Vancomycin ?  - f/u wound cx, blood cx  - Rt foot wound dressing recs per Podiatry  -------  2. Abx allergy  - Bactrim - flushing, Hives  - outpatient  follow-up with Allergy/Immunology    MAXIME Mancia MD  Fellow, Infectious Diseases  Pager: [phone]  If no response, after 5pm and on Weekends: Call [phone]   65/F with PMH morbid obesity (ambulates with walker d/t spinal stenosis), DM on insulin, HTN, HLD, HFpEF, CKD3, Asthma, Pulm HTN (on NC 3l/min), old LLE DVT no longer on A/C x 5 years, Ovarian Ca s/p 2010 MEGAN, insomnia, Vit D Deficiency, chronic RLE wounds with Charcot arthropathy  - H/o chronic RLE wounds - recent admissions Nov 2020 and Jan 2021 - s/p PO abx  - Sent in by visiting Podiatrist for concern for worsening RLE plantar ulcer. Ulcer lanced and drained at home (no cx taken). H/o chills night before admission  - In ED, AVSS. Labs revealed WBC 10,570  - Eval by Podiatry - rt foot plantar wound to SubQ, no purulence, no drainage, no malodor, cellulitis to ankle. Excisional debridement performed down to SUbQ. Wound culture taken. MRI negative for OM. ID consulted for recs  =======  Diabetic foot wound infection  - Pt has DM (HbA1c 8.5) with underlying Charcot arthropathy - prone to recurrent infections  - Wound cx showing S. aureus, K. pneumoniae, P. mirablis, S. marascens. Past cx with MSSA and Finegoldia  - MRI revealed Charcot Arthropathy with no concern for OM  - would continue IV empiric abx therapy for now, pending final wound cx. Once sensitivities obtained, anticipate switch to PO abx for 10-14 day course    Abx allergy: Reports receiving Bactrim approximately 3 years ago, f/b flushing and itching  - was on triple abx at that time. Reports having been admitted for those complaints    RECOMMENDATIONS:  1. Rt Foot wound  - continue Cefepime 2g IV q12h  - give Vancomycin 1g IV X 1  - f/u wound cx, blood cx  - Rt foot wound dressing recs per Podiatry  -------  2. Abx allergy  - Bactrim - flushing, Hives  - outpatient  follow-up with Allergy/Immunology    Recs conveyed to primary team MAXIME Botelol, MD  Fellow, Infectious Diseases  Pager: [phone]  If no response, after 5pm and on Weekends: Call [phone]

## 2021-03-13 NOTE — PROGRESS NOTE ADULT - PROBLEM SELECTOR PLAN 1
-Continue Cefepime 2g IV Q12*.   -c/w renally dosed vanco and cefepime    -  B/L LE Duplex negative for DVT   - F/U Wound Cx & Blood Cx  - F/U MRI w/ wo of R foot.  - F/U PT, SW and wound care assessment. -MRI negative for OM, podaitry f/u apprecaited  recommended ID eval for DC planning  -ID eval appreciated, recommend c/w continue cefepime 2gm!v q 12hrs   and vanco 1gm  IV X 1   -  B/L LE Duplex negative for DVT   - F/U Wound Cx & Blood Cx  - F/U PT, SW and wound care assessment.

## 2021-03-13 NOTE — DISCHARGE NOTE PROVIDER - NSDCCPCAREPLAN_GEN_ALL_CORE_FT
PRINCIPAL DISCHARGE DIAGNOSIS  Diagnosis: Foot infection  Assessment and Plan of Treatment: You have been evaluated by podiatry and infectious disease during this admission. Please complete course of antibiotics as prescribed. Follow up with Dr. Riley from podiatry within 1 week of discharge from the hospital, please call [phone] for appointment and discuss that you recently were seen in the hospital.  Wound Care: Please apply daily aquacel, gauze, ABD pad, kerlix,ACE.  Weight bearing: Please weight bear as tolerated in a surgical shoe.  Antibiotics: Please continue as instructed.       PRINCIPAL DISCHARGE DIAGNOSIS  Diagnosis: Foot infection  Assessment and Plan of Treatment: You have been evaluated by podiatry and infectious disease during this admission. Please complete course of antibiotics as prescribed. Follow up with Dr. Riley from podiatry within 1 week of discharge from the hospital, please call [phone] for appointment and discuss that you recently were seen in the hospital.  Wound Care: Please apply daily aquacel, gauze, ABD pad, kerlix,ACE.  Weight bearing: Please weight bear as tolerated in a surgical shoe.  Antibiotics: Please continue as instructed.      SECONDARY DISCHARGE DIAGNOSES  Diagnosis: Moderate asthma, unspecified whether complicated, unspecified whether persistent  Assessment and Plan of Treatment: Moderate asthma, unspecified whether complicated, unspecified whether persistent  - Continue inhalers.    Diagnosis: Essential hypertension  Assessment and Plan of Treatment: Essential hypertension  - Continue low salt diet.    Diagnosis: Congestive heart failure, unspecified HF chronicity, unspecified heart failure type  Assessment and Plan of Treatment: Continue low salt diet.   Continue Lasix at home.    Diagnosis: Type 2 diabetes mellitus with stage 3 chronic kidney disease, with long-term current use of insulin, unspecified whether stage 3a or 3b CKD  Assessment and Plan of Treatment: Continue to monitor your fingersticks four times a day.     PRINCIPAL DISCHARGE DIAGNOSIS  Diagnosis: Foot infection  Assessment and Plan of Treatment: You have been evaluated by podiatry and infectious disease during this admission. Please complete course of antibiotics as prescribed. Follow up with Dr. Riley from podiatry within 1 week of discharge from the hospital, please call [phone] for appointment and discuss that you recently were seen in the hospital.  Wound Care: Please apply daily aquacel, gauze, ABD pad, kerlix,ACE.  Weight bearing: Please weight bear as tolerated in a surgical shoe.  Antibiotics: Please continue as instructed.   Wound cx showing S. aureus, K. pneumoniae, P. mirablis, S. marascens. Past cx with MSSA and Finegoldia  - MRI revealed Charcot Arthropathy with no concern for OM  Take Keflex+ levaquin X 7 more days        SECONDARY DISCHARGE DIAGNOSES  Diagnosis: Essential hypertension  Assessment and Plan of Treatment: Essential hypertension  - Continue low salt diet.    Diagnosis: Moderate asthma, unspecified whether complicated, unspecified whether persistent  Assessment and Plan of Treatment: Moderate asthma, unspecified whether complicated, unspecified whether persistent  - Continue inhalers.    Diagnosis: Type 2 diabetes mellitus with stage 3 chronic kidney disease, with long-term current use of insulin, unspecified whether stage 3a or 3b CKD  Assessment and Plan of Treatment: Continue to monitor your fingersticks four times a day.    Diagnosis: Congestive heart failure, unspecified HF chronicity, unspecified heart failure type  Assessment and Plan of Treatment: Continue low salt diet.   Continue Lasix at home.     PRINCIPAL DISCHARGE DIAGNOSIS  Diagnosis: Foot infection  Assessment and Plan of Treatment: You have been evaluated by podiatry and infectious disease during this admission. Please complete course of antibiotics as prescribed. Follow up with Dr. Riley from podiatry within 1 week of discharge from the hospital, please call [phone] for appointment and discuss that you recently were seen in the hospital.  Wound Care: Please apply daily aquacel, gauze, ABD pad, kerlix,ACE.  Weight bearing: Please weight bear as tolerated in a surgical shoe.  Antibiotics: Please continue as instructed.   Wound cx showing S. aureus, K. pneumoniae, P. mirablis, S. marascens. Past cx with MSSA and Finegoldia  - MRI revealed Charcot Arthropathy with no concern for OM  Take Keflex+ levaquin X 7 more days  ****Hold Zofran while using Levaquin****        SECONDARY DISCHARGE DIAGNOSES  Diagnosis: Essential hypertension  Assessment and Plan of Treatment: Essential hypertension  - Continue low salt diet.    Diagnosis: Moderate asthma, unspecified whether complicated, unspecified whether persistent  Assessment and Plan of Treatment: Moderate asthma, unspecified whether complicated, unspecified whether persistent  - Continue inhalers.    Diagnosis: Type 2 diabetes mellitus with stage 3 chronic kidney disease, with long-term current use of insulin, unspecified whether stage 3a or 3b CKD  Assessment and Plan of Treatment: Continue to monitor your fingersticks four times a day.    Diagnosis: Congestive heart failure, unspecified HF chronicity, unspecified heart failure type  Assessment and Plan of Treatment: Continue low salt diet.   Continue Lasix at home.

## 2021-03-13 NOTE — PROGRESS NOTE ADULT - PROBLEM SELECTOR PLAN 2
Likely Opoid induced.  Last BM 3/10 consider small amount.  TSH WNL   Continue Miralax po QHS.   Senna 2 tabs QHS added to medication regimen.

## 2021-03-13 NOTE — PROGRESS NOTE ADULT - PROBLEM SELECTOR PLAN 3
-BS= 81, FS= 67.  -A1C. 8.5   -DM diet.  - Continue Levemir and Humalog @ 80% home dose.  - FS QID  - ISS.

## 2021-03-13 NOTE — CONSULT NOTE ADULT - ATTENDING COMMENTS
Patient seen and examined  Above verified  Agree with above unless as noted below.  65/F with PMH morbid obesity (ambulates with walker d/t spinal stenosis), DM on insulin, HTN, HLD, HFpEF, CKD3, Asthma, Pulm HTN (on NC 3l/min), old LLE DVT no longer on A/C x 5 years, Ovarian Ca s/p 2010 MEAGN, insomnia, Vit D Deficiency, chronic RLE wounds with Charcot arthropathy  - H/o chronic RLE wounds - recent admissions Nov 2020 and Jan 2021 - s/p PO abx  - Sent in by visiting Podiatrist for concern for worsening RLE plantar ulcer. Ulcer lanced and drained at home (no cx taken). H/o chills night before admission  - In ED, AVSS. Labs revealed WBC 10,570  - Eval by Podiatry - rt foot plantar wound to SubQ, no purulence, no drainage, no malodor, cellulitis to ankle. Excisional debridement performed down to SUbQ. Wound culture taken. MRI negative for OM. ID consulted for recs  =======  Diabetic foot wound infection  - Pt has DM (HbA1c 8.5) with underlying Charcot arthropathy - prone to recurrent infections  - Wound cx showing S. aureus, K. pneumoniae, P. mirablis, S. marascens. Past cx with MSSA and Finegoldia  - MRI revealed Charcot Arthropathy with no concern for OM  - would continue IV empiric abx therapy for now, pending final wound cx.   continue cefepime  vanco IV X 1   Will tailor plan for ID issues  per course,results.Will defer to primary team on management of other issues.  Assessment, plan and recommendations as detailed above were discussed with the medical/primary  team.  Will Follow.  Beeper [phone] [facility] [id number].   Wknd/afterhours/No response-[phone] or Fellow on call

## 2021-03-13 NOTE — DISCHARGE NOTE PROVIDER - NSDCMRMEDTOKEN_GEN_ALL_CORE_FT
ADVAIR -21 MCG INHALER: TAKE 2 PUFFS BY MOUTH TWICE A DAY  Aspirin Enteric Coated 81 mg oral delayed release tablet: 1 tab(s) orally once a day  clonazePAM 0.5 mg oral tablet: 1 tab(s) orally once a day (at bedtime)  furosemide 40 mg oral tablet: 1 tab(s) orally once a day    **NOTE: As per pharmacy, furosemide dosage is 20 mg, NOT 40 mg**  HYDROCODONE/ACETAMINOPHEN 7.5-325 T:   IPRATROPIUM INHAL SOLN 60 X 2.5ML:   Levemir 100 units/mL subcutaneous solution: 80 unit(s) subcutaneous 2 times a day  NovoLOG FlexPen 100 units/mL injectable solution: 15 unit(s) injectable 3 times a day  ONDANSETRON HCL 4 MG TABLET: TAKE 1 TABLET BY MOUTH THREE TIMES A DAY AS NEEDED  OxyCONTIN 80 mg oral tablet, extended release: 1 tab(s) orally every 12 hours  polyethylene glycol 3350 oral powder for reconstitution: 17 gram(s) orally once a day  ProAir HFA 90 mcg/inh inhalation aerosol: 2 puff(s) inhaled every 6 hours, As Needed  rosuvastatin 20 mg oral tablet: 1 tab(s) orally once a day (at bedtime)  Vitamin D2 2000 intl units (50 mcg) oral capsule: 1 cap(s) orally once a day   acetaminophen 325 mg oral tablet: 2 tab(s) orally every 6 hours, As needed, Temp greater or equal to 38C (100.4F), Mild Pain (1 - 3)  ADVAIR -21 MCG INHALER: TAKE 2 PUFFS BY MOUTH TWICE A DAY  Aspirin Enteric Coated 81 mg oral delayed release tablet: 1 tab(s) orally once a day  cephalexin 500 mg oral capsule: 1 cap(s) orally every 8 hours  clonazePAM 0.5 mg oral tablet: 1 tab(s) orally once a day (at bedtime)  furosemide 40 mg oral tablet: 1 tab(s) orally once a day  IPRATROPIUM INHAL SOLN 60 X 2.5ML:   Levemir 100 units/mL subcutaneous solution: 64 unit(s) subcutaneous 2 times a day  levoFLOXacin 500 mg oral tablet: 1 tab(s) orally every 24 hours  NovoLOG FlexPen 100 units/mL injectable solution: 12 unit(s) injectable 3 times a day  nystatin 100,000 units/g topical powder: 1 application topically 2 times a day  ondansetron 4 mg oral tablet: 1 tab(s) orally every 8 hours PRN for nausea  oxyCODONE 7.5 mg oral tablet: 1 tab(s) orally every 8 hours, As needed, moderateand sever pain MDD:3  OxyCONTIN 80 mg oral tablet, extended release: 1 tab(s) orally every 12 hours  polyethylene glycol 3350 oral powder for reconstitution: 17 gram(s) orally 2 times a day   ProAir HFA 90 mcg/inh inhalation aerosol: 2 puff(s) inhaled every 6 hours, As Needed  rosuvastatin 20 mg oral tablet: 1 tab(s) orally once a day (at bedtime)  senna oral tablet: 2 tab(s) orally once a day (at bedtime)  Vitamin D2 2000 intl units (50 mcg) oral capsule: 1 cap(s) orally once a day   acetaminophen 325 mg oral tablet: 2 tab(s) orally every 6 hours, As needed, Temp greater or equal to 38C (100.4F), Mild Pain (1 - 3)  ADVAIR -21 MCG INHALER: TAKE 2 PUFFS BY MOUTH TWICE A DAY  Aspirin Enteric Coated 81 mg oral delayed release tablet: 1 tab(s) orally once a day  cephalexin 500 mg oral capsule: 1 cap(s) orally every 8 hours  clonazePAM 0.5 mg oral tablet: 1 tab(s) orally once a day (at bedtime)  furosemide 40 mg oral tablet: 1 tab(s) orally once a day  IPRATROPIUM INHAL SOLN 60 X 2.5ML:   Levemir 100 units/mL subcutaneous solution: 64 unit(s) subcutaneous 2 times a day  levoFLOXacin 500 mg oral tablet: 1 tab(s) orally every 24 hours  NovoLOG FlexPen 100 units/mL injectable solution: 12 unit(s) injectable 3 times a day  nystatin 100,000 units/g topical powder: 1 application topically 2 times a day  ondansetron 4 mg oral tablet: HOLD While on Levaquin   oxyCODONE 5 mg oral tablet: 1 tab(s) orally every 6 hours MDD:4  oxyCODONE 7.5 mg oral tablet: 1 tab(s) orally every 8 hours, As needed, moderateand sever pain MDD:3  OxyCONTIN 80 mg oral tablet, extended release: 1 tab(s) orally every 12 hours  polyethylene glycol 3350 oral powder for reconstitution: 17 gram(s) orally 2 times a day   ProAir HFA 90 mcg/inh inhalation aerosol: 2 puff(s) inhaled every 6 hours, As Needed  rosuvastatin 20 mg oral tablet: 1 tab(s) orally once a day (at bedtime)  senna oral tablet: 2 tab(s) orally once a day (at bedtime)  Vitamin D2 2000 intl units (50 mcg) oral capsule: 1 cap(s) orally once a day   acetaminophen 325 mg oral tablet: 2 tab(s) orally every 6 hours, As needed, Temp greater or equal to 38C (100.4F), Mild Pain (1 - 3)  ADVAIR -21 MCG INHALER: TAKE 2 PUFFS BY MOUTH TWICE A DAY  Aspirin Enteric Coated 81 mg oral delayed release tablet: 1 tab(s) orally once a day  cephalexin 500 mg oral capsule: 1 cap(s) orally every 8 hours  clonazePAM 0.5 mg oral tablet: 1 tab(s) orally once a day (at bedtime)  furosemide 40 mg oral tablet: 1 tab(s) orally once a day  IPRATROPIUM INHAL SOLN 60 X 2.5ML:   Levemir 100 units/mL subcutaneous solution: 64 unit(s) subcutaneous 2 times a day  levoFLOXacin 500 mg oral tablet: 1 tab(s) orally every 24 hours  NovoLOG FlexPen 100 units/mL injectable solution: 12 unit(s) injectable 3 times a day  nystatin 100,000 units/g topical powder: 1 application topically 2 times a day  ondansetron 4 mg oral tablet: HOLD While on Levaquin   oxyCODONE 7.5 mg oral tablet: 1 tab(s) orally every 8 hours, As needed, moderateand sever pain MDD:3  OxyCONTIN 80 mg oral tablet, extended release: 1 tab(s) orally every 12 hours  polyethylene glycol 3350 oral powder for reconstitution: 17 gram(s) orally 2 times a day   ProAir HFA 90 mcg/inh inhalation aerosol: 2 puff(s) inhaled every 6 hours, As Needed  rosuvastatin 20 mg oral tablet: 1 tab(s) orally once a day (at bedtime)  senna oral tablet: 2 tab(s) orally once a day (at bedtime)  Vitamin D2 2000 intl units (50 mcg) oral capsule: 1 cap(s) orally once a day

## 2021-03-13 NOTE — PROGRESS NOTE ADULT - ASSESSMENT
65F history of CDK3, DM2. HTN, Asthma, DVT, Insomnia, Spinal stenosis, Uterine Ca s/p MEGAN, old LLE DVT no longer on A/C, admitted for R LE cellulitis due to R plantar foot diabetic ulcer.   Podiatry consult appreciated.

## 2021-03-13 NOTE — PROGRESS NOTE ADULT - PROBLEM SELECTOR PLAN 4
BP = 180/ 60.  Low salt diet.   Pt no longer on Lisinopril due to CKD.  Will give Norvasc 5 mg po x  for now. BP = 180/ 60.  Low salt diet.   Pt no longer on Lisinopril due to CKD.  s/p  Norvasc 5 mg po x  1  bp currently stable, will continue to monitor

## 2021-03-13 NOTE — PROGRESS NOTE ADULT - ASSESSMENT
65 F RF wound to SuB cellulitis to ankle  - pt seen and evaluated  - afebrile, vitals stable, WBC 10.2  -RFXR: Soft tissue swelling. Plantar hindfoot region shallow soft tissue ulceration again noted. No tracking gas collections beyond the ulcer margins and no gross radiographic evidence for underlying osteomyelitis.  - right foot plantar wound to SubQ, no purulence, no drainage, no malodor, no fluctuance, 3+ pitting edema, cellulitis to ankle, etiology diabetic neuropathy  - wound culture pending  - continue IV Vanco/Cefepime  - MRI revealed no evidence of Osteomyelitis  - rec ID consult for PO Abx for d/c for soft tissue infection  - stable for discharge from podiatry standpoint pending ID recs  - f/u information & instructions can be found in the f/u section of the provider d/c note   - discussed w/ attending

## 2021-03-13 NOTE — CONSULT NOTE ADULT - SUBJECTIVE AND OBJECTIVE BOX
Patient is a 65y old  Female who presents with a chief complaint of Worsening R foot ulcer x 1 day (13 Mar 2021 08:11)    HPI:  65F, morbidly obese, ambulates with a walker due to spinal stenosis, history of DM2 on insult x 26 years, Asthma with multiple hospitalizations, never intubated, old LLE DVT no longer on A/C x 5 years, Ovarian Ca s/p 2010 MEGAN, CKD3, Hyperlipidemia, HTN, HFpEF@70% on home O2 3L, Insomnia, Vit D Deficiency, the pt was seen by the visiting home MD and saw the R LE plantar ulcer worsening, the ulcer was lanced and drained at the pt's home and the pt was sent to the ED, [facility] for Abx IV and MRI of R LE. In the Ed, the pt was given Cefepime 2g IV and Vancomycin 1 G IV, she was seen by Podiatry. Their consult and recommendations are in the chart. The pt admits to Occipital HA, SOB, LEGER after ambulating 3 feet, non exertional, sub sternal 5/10, chest cramping sensation, that began at 4am on 3/11, subsiding on it's own and not returning, constipation with last BM, consider small on 3/10. Denies blurry vision, dizziness, falls, cough, palpitation, nausea, vomit, diarrhea, adnominal pain, dysuria, chills, generalized body aches. Compliant with medications and diet. Able to reconcile home meds.     Vitals : T max: 98.4 oral, HR= 80b/ min, BP = 180/ 60, RR= 16b/ min, SPO2= 96% ra  (11 Mar 2021 20:37)  =======  - Eval by Podiatry - rt foot plantar wound to SubQ, no purulence, no drainage, no malodor, no fluctuance, 3+ pitting edema, cellulitis to ankle, etiology diabetic neuropathy  - Excisional debridement of wound performed down to SUbQ but not beyond using sterile # 15 blade, wound flushed, DSD applied. Wound culture taken. Podiatry recc MRI  - MRI negative for OM. ID consulted for recs    prior hospital charts reviewed [  ]  primary team notes reviewed [  ]  other consultant notes reviewed [  ]    PAST MEDICAL & SURGICAL HISTORY:  Gait difficulty  ~ u ses walker    On home oxygen therapy    Asthma    Uterine cancer    Congestive heart failure  ~ HFpEF    CKD (chronic kidney disease)  ~ III    Insomnia    Vitamin D deficiency    Morbid Obesity    Cataract    Dyslipidemia    Deep Vein Thrombosis (DVT)  Left leg, 2004, treated and resolved    Spinal Stenosis, Lumbar    Cervical Stenosis of Spine    Endometrial Hyperplasia    HTN (Hypertension)    Diabetes Mellitus Type II    S/P cholecystectomy    S/P appendectomy    S/P total abdominal hysterectomy and bilateral salpingo-oophorectomy    H/O colonoscopy  1998    H/O laser iridotomy  left eye, 2016    Endometrial biopsy 12/02/09    Tonsillectomy as a child    Cervical Spinal Stenosis surgery x2 (01/2002, 7/2002)    D&amp;C 2008  hysteroscopy, endometrial hyperplasia, 2009    D&amp;C x2 1980&#x27;s    Cholecystectomy/appendectomy @ age 25    C Section 1994    Cataract extracted with lens implant 1998  Right      Allergies  adhesives (Rash)  Bactrim (Flushing)  latex (Rash)  wool- rash, itch (Other)    ANTIMICROBIALS (past 90 days)  MEDICATIONS  (STANDING):  cefepime   IVPB   100 mL/Hr IV Intermittent (03-13-21 @ 05:32)   100 mL/Hr IV Intermittent (03-12-21 @ 17:43)   100 mL/Hr IV Intermittent (03-12-21 @ 05:30)    cefepime   IVPB   100 mL/Hr IV Intermittent (03-11-21 @ 18:21)    vancomycin  IVPB   250 mL/Hr IV Intermittent (03-11-21 @ 19:14)    vancomycin  IVPB   250 mL/Hr IV Intermittent (03-12-21 @ 17:43)      ANTIMICROBIALS:    cefepime   IVPB 2000 every 12 hours    OTHER MEDS: MEDICATIONS  (STANDING):  acetaminophen   Tablet .. 650 every 6 hours PRN  ALBUTerol    90 MICROgram(s) HFA Inhaler 2 every 6 hours PRN  aspirin enteric coated 81 daily  atorvastatin 80 at bedtime  budesonide 160 MICROgram(s)/formoterol 4.5 MICROgram(s) Inhaler 2 two times a day  clonazePAM  Tablet 0.5 at bedtime  dextrose 40% Gel 15 once  dextrose 50% Injectable 25 once  dextrose 50% Injectable 12.5 once  dextrose 50% Injectable 25 once  furosemide    Tablet 40 daily  glucagon  Injectable 1 once  heparin   Injectable 5000 every 8 hours  insulin detemir injectable (LEVEMIR) 64 two times a day  insulin lispro (ADMELOG) corrective regimen sliding scale  three times a day before meals  insulin lispro (ADMELOG) corrective regimen sliding scale  at bedtime  insulin lispro Injectable (ADMELOG) 12 three times a day before meals  ipratropium 17 MICROgram(s) HFA Inhaler 1 every 6 hours  ondansetron    Tablet 4 every 6 hours PRN  oxyCODONE    IR 7.5 every 8 hours PRN  oxyCODONE  ER Tablet 80 every 12 hours  polyethylene glycol 3350 17 daily  senna 2 at bedtime    SOCIAL HISTORY:   hx smoking  non-smoker    FAMILY HISTORY:  Family history of lung cancer (Grandparent)    Family history of bladder cancer    Family history of pancreatic cancer      REVIEW OF SYSTEMS  [  ] ROS unobtainable because:    [  ] All other systems negative except as noted below:	    Constitutional:  [ ] fever [ ] chills  [ ] weight loss  [ ] weakness  Skin:  [ ] rash [ ] phlebitis	  Eyes: [ ] icterus [ ] pain  [ ] discharge	  ENMT: [ ] sore throat  [ ] thrush [ ] ulcers [ ] exudates  Respiratory: [ ] dyspnea [ ] hemoptysis [ ] cough [ ] sputum	  Cardiovascular:  [ ] chest pain [ ] palpitations [ ] edema	  Gastrointestinal:  [ ] nausea [ ] vomiting [ ] diarrhea [ ] constipation [ ] pain	  Genitourinary:  [ ] dysuria [ ] frequency [ ] hematuria [ ] discharge [ ] flank pain  [ ] incontinence  Musculoskeletal:  [ ] myalgias [ ] arthralgias [ ] arthritis  [ ] back pain  Neurological:  [ ] headache [ ] seizures  [ ] confusion/altered mental status  Psychiatric:  [ ] anxiety [ ] depression	  Hematology/Lymphatics:  [ ] lymphadenopathy  Endocrine:  [ ] adrenal [ ] thyroid  Allergic/Immunologic:	 [ ] transplant [ ] seasonal    Vital Signs Last 24 Hrs  T(F): 98.1 (03-13-21 @ 05:29), Max: 98.9 (03-12-21 @ 01:49)  Vital Signs Last 24 Hrs  HR: 71 (03-13-21 @ 05:29) (71 - 83)  BP: 109/50 (03-13-21 @ 05:29) (109/50 - 123/50)  RR: 18 (03-13-21 @ 05:29)  SpO2: 98% (03-13-21 @ 05:29) (98% - 99%)  Wt(kg): --    EXAM:  Constitutional: Not in acute distress  Eyes: pupils bilaterally reactive to light. No icterus.  Oral cavity: Clear, no lesions  Neck: No neck vein distension noted  RS: Chest clear to auscultation bilaterally. No wheeze/rhonchi/crepitations.  CVS: S1, S2 heard. Regular rate and rhythm. No murmurs/rubs/gallops.  Abdomen: Soft. No guarding/rigidity/tenderness.  : No acute abnormalities  Extremities: Warm. No pedal edema  Skin: No lesions noted  Vascular: No evidence of phlebitis  Neuro: Alert, oriented to time/place/person                          11.8   10.28 )-----------( 228      ( 12 Mar 2021 08:52 )             38.0     03-13    139  |  99  |  31<H>  ----------------------------<  177<H>  4.6   |  26  |  1.57<H>    Ca    9.7      13 Mar 2021 07:47  Phos  3.4     03-13  Mg     2.1     03-13    TPro  8.0  /  Alb  4.2  /  TBili  0.7  /  DBili  x   /  AST  26  /  ALT  19  /  AlkPhos  116  03-11    MICROBIOLOGY:Vancomycin Level, Random: 9.2 (03-13 @ 07:47)  Vancomycin Level, Random: 4.5 (03-12 @ 14:58)    Culture - Blood (collected 11 Mar 2021 23:04)  Source: .Blood Blood-Venous  Preliminary Report (13 Mar 2021 01:02):    No growth to date.    Culture - Abscess with Gram Stain (collected 11 Mar 2021 18:36)  Source: .Abscess foot  Preliminary Report (12 Mar 2021 18:28):    Few Staphylococcus aureus    Rare Proteus mirabilis    Rare Serratia marcescens    Rare Klebsiella pneumoniae        RADIOLOGY:  imaging below personally reviewed  < from: MR Foot w/wo IV Cont, Right (03.12.21 @ 16:12) >  IMPRESSION:  1. Charcot changes of the midfoot and hindfoot are again seen.  2. Plantar cutaneous wounds appear to be present along the midfoot without underlying osteomyelitis or abscess.  3. Marrow edema of the fourth and fifth metatarsal heads is presumably reactive or stress related in the absence of nearby soft tissue wounds.  < end of copied text >    < from: US Duplex Venous Lower Ext Complete, Bilateral (03.12.21 @ 11:52) >  IMPRESSION:  Limited examination.  No evidence of deep venous thrombosis in either lower extremity.  < end of copied text >    < from: Xray Foot AP + Lateral + Oblique, Right (03.11.21 @ 17:16) >  IMPRESSION:  Soft tissue swelling. Plantar hindfoot region shallow soft tissue ulceration again noted. No tracking gas collections beyond the ulcer margins and no gross radiographic evidence for underlying osteomyelitis.  Redemonstrated chronic midfootdeformity with bulky ossific debris in dorsal talonavicular region.  Normal forefoot configuration and alignment with preserved joint spaces.  No discrete suspicious lytic or blastic lesions.  < end of copied text >        OTHER TESTS:     Patient is a 65y old  Female who presents with a chief complaint of Worsening R foot ulcer x 1 day (13 Mar 2021 08:11)    HPI:  65F, morbidly obese, ambulates with a walker due to spinal stenosis, history of DM2 on insult x 26 years, Asthma with multiple hospitalizations, never intubated, old LLE DVT no longer on A/C x 5 years, Ovarian Ca s/p 2010 MEGAN, CKD3, Hyperlipidemia, HTN, HFpEF@70% on home O2 3L, Insomnia, Vit D Deficiency, the pt was seen by the visiting home MD and saw the R LE plantar ulcer worsening, the ulcer was lanced and drained at the pt's home and the pt was sent to the ED, [facility] for Abx IV and MRI of R LE. In the Ed, the pt was given Cefepime 2g IV and Vancomycin 1 G IV, she was seen by Podiatry. Their consult and recommendations are in the chart. The pt admits to Occipital HA, SOB, LEGER after ambulating 3 feet, non exertional, sub sternal 5/10, chest cramping sensation, that began at 4am on 3/11, subsiding on it's own and not returning, constipation with last BM, consider small on 3/10. Denies blurry vision, dizziness, falls, cough, palpitation, nausea, vomit, diarrhea, adnominal pain, dysuria, chills, generalized body aches. Compliant with medications and diet. Able to reconcile home meds.     Vitals : T max: 98.4 oral, HR= 80b/ min, BP = 180/ 60, RR= 16b/ min, SPO2= 96% ra  (11 Mar 2021 20:37)  =======  - Eval by Podiatry - rt foot plantar wound to SubQ, no purulence, no drainage, no malodor, no fluctuance, 3+ pitting edema, cellulitis to ankle, etiology diabetic neuropathy. Excisional debridement of wound performed down to SUbQ but not beyond using sterile # 15 blade, wound flushed, DSD applied. Wound culture taken. Podiatry recc MRI  - MRI negative for OM. ID consulted for recs  --------  - Above HPI reviewed and reconciled with patient  - reports chills prior to admission  - Denied fevers, rash, cough, coryza, dysuria, loose stools, recent travel, sick contacts, water exposure    prior hospital charts reviewed [x]  primary team notes reviewed [x]  other consultant notes reviewed [x]    PAST MEDICAL & SURGICAL HISTORY:  Gait difficulty  ~ u ses walker    On home oxygen therapy    Asthma    Uterine cancer    Congestive heart failure  ~ HFpEF    CKD (chronic kidney disease)  ~ III    Insomnia    Vitamin D deficiency    Morbid Obesity    Cataract    Dyslipidemia    Deep Vein Thrombosis (DVT)  Left leg, 2004, treated and resolved    Spinal Stenosis, Lumbar    Cervical Stenosis of Spine    Endometrial Hyperplasia    HTN (Hypertension)    Diabetes Mellitus Type II    S/P cholecystectomy    S/P appendectomy    S/P total abdominal hysterectomy and bilateral salpingo-oophorectomy    H/O colonoscopy  1998    H/O laser iridotomy  left eye, 2016    Endometrial biopsy 12/02/09    Tonsillectomy as a child    Cervical Spinal Stenosis surgery x2 (01/2002, 7/2002)    D&amp;C 2008  hysteroscopy, endometrial hyperplasia, 2009    D&amp;C x2 1980&#x27;s    Cholecystectomy/appendectomy @ age 25    C Section 1994    Cataract extracted with lens implant 1998  Right      Allergies  adhesives (Rash)  Bactrim (Flushing)  latex (Rash)  wool- rash, itch (Other)    ANTIMICROBIALS (past 90 days)  MEDICATIONS  (STANDING):  cefepime   IVPB   100 mL/Hr IV Intermittent (03-13-21 @ 05:32)   100 mL/Hr IV Intermittent (03-12-21 @ 17:43)   100 mL/Hr IV Intermittent (03-12-21 @ 05:30)    cefepime   IVPB   100 mL/Hr IV Intermittent (03-11-21 @ 18:21)    vancomycin  IVPB   250 mL/Hr IV Intermittent (03-11-21 @ 19:14)    vancomycin  IVPB   250 mL/Hr IV Intermittent (03-12-21 @ 17:43)      ANTIMICROBIALS:    cefepime   IVPB 2000 every 12 hours    OTHER MEDS: MEDICATIONS  (STANDING):  acetaminophen   Tablet .. 650 every 6 hours PRN  ALBUTerol    90 MICROgram(s) HFA Inhaler 2 every 6 hours PRN  aspirin enteric coated 81 daily  atorvastatin 80 at bedtime  budesonide 160 MICROgram(s)/formoterol 4.5 MICROgram(s) Inhaler 2 two times a day  clonazePAM  Tablet 0.5 at bedtime  dextrose 40% Gel 15 once  dextrose 50% Injectable 25 once  dextrose 50% Injectable 12.5 once  dextrose 50% Injectable 25 once  furosemide    Tablet 40 daily  glucagon  Injectable 1 once  heparin   Injectable 5000 every 8 hours  insulin detemir injectable (LEVEMIR) 64 two times a day  insulin lispro (ADMELOG) corrective regimen sliding scale  three times a day before meals  insulin lispro (ADMELOG) corrective regimen sliding scale  at bedtime  insulin lispro Injectable (ADMELOG) 12 three times a day before meals  ipratropium 17 MICROgram(s) HFA Inhaler 1 every 6 hours  ondansetron    Tablet 4 every 6 hours PRN  oxyCODONE    IR 7.5 every 8 hours PRN  oxyCODONE  ER Tablet 80 every 12 hours  polyethylene glycol 3350 17 daily  senna 2 at bedtime    SOCIAL HISTORY:  - lives alone  - has cat at home    FAMILY HISTORY:  Family history of lung cancer (Grandparent)  Family history of bladder cancer  Family history of pancreatic cancer      REVIEW OF SYSTEMS  [  ] ROS unobtainable because:    [x] All other systems negative except as noted below:	  Constitutional:  [ ] fever [x] chills  [ ] weight loss  [ ] weakness  Skin:  [ ] rash [ ] phlebitis	  Eyes: [ ] icterus [ ] pain  [ ] discharge	  ENMT: [ ] sore throat  [ ] thrush [ ] ulcers [ ] exudates  Respiratory: [ ] dyspnea [ ] hemoptysis [ ] cough [ ] sputum	  Cardiovascular:  [ ] chest pain [ ] palpitations [ ] edema	  Gastrointestinal:  [ ] nausea [ ] vomiting [ ] diarrhea [ ] constipation [ ] pain	  Genitourinary:  [ ] dysuria [ ] frequency [ ] hematuria [ ] discharge [ ] flank pain  [ ] incontinence  Musculoskeletal:  [ ] myalgias [ ] arthralgias [ ] arthritis  [ ] back pain [x] LLE wound  Neurological:  [ ] headache [ ] seizures  [ ] confusion/altered mental status  Psychiatric:  [ ] anxiety [ ] depression	  Hematology/Lymphatics:  [ ] lymphadenopathy  Endocrine:  [ ] adrenal [ ] thyroid  Allergic/Immunologic:	 [ ] transplant [ ] seasonal    Vital Signs Last 24 Hrs  T(F): 98.1 (03-13-21 @ 05:29), Max: 98.9 (03-12-21 @ 01:49)  Vital Signs Last 24 Hrs  HR: 71 (03-13-21 @ 05:29) (71 - 83)  BP: 109/50 (03-13-21 @ 05:29) (109/50 - 123/50)  RR: 18 (03-13-21 @ 05:29)  SpO2: 98% (03-13-21 @ 05:29) (98% - 99%)  Wt(kg): --    EXAM:  Constitutional: Not in acute distress. On NC 3l/min  Eyes: pupils bilaterally reactive to light. No icterus.  Oral cavity: Dental caries noted  Neck: No neck vein distension noted  RS: Coarse basilar breath sounds bilaterally. No wheeze/rhonchi.  CVS: S1, S2 heard. Regular rate and rhythm. No murmurs/rubs/gallops.  Abdomen: Soft. No guarding/rigidity/tenderness.  : No acute abnormalities  Extremities: Warm. No pedal edema  Skin: RLE in dressing  Patient had photos of RLE wound prior to hospital admission - chronic ulcer seen.  Vascular: No evidence of phlebitis  Neuro: Alert, oriented to time/place/person                          11.8   10.28 )-----------( 228      ( 12 Mar 2021 08:52 )             38.0     03-13    139  |  99  |  31<H>  ----------------------------<  177<H>  4.6   |  26  |  1.57<H>    Ca    9.7      13 Mar 2021 07:47  Phos  3.4     03-13  Mg     2.1     03-13    TPro  8.0  /  Alb  4.2  /  TBili  0.7  /  DBili  x   /  AST  26  /  ALT  19  /  AlkPhos  116  03-11    MICROBIOLOGY:  Vancomycin Level, Random: 9.2 (03-13 @ 07:47)  Vancomycin Level, Random: 4.5 (03-12 @ 14:58)    Culture - Blood (collected 11 Mar 2021 23:04)  Source: .Blood Blood-Venous  Preliminary Report (13 Mar 2021 01:02):    No growth to date.    Culture - Abscess with Gram Stain (collected 11 Mar 2021 18:36)  Source: .Abscess foot  Preliminary Report (12 Mar 2021 18:28):    Few Staphylococcus aureus    Rare Proteus mirabilis    Rare Serratia marcescens    Rare Klebsiella pneumoniae        RADIOLOGY:  imaging below personally reviewed  < from: MR Foot w/wo IV Cont, Right (03.12.21 @ 16:12) >  IMPRESSION:  1. Charcot changes of the midfoot and hindfoot are again seen.  2. Plantar cutaneous wounds appear to be present along the midfoot without underlying osteomyelitis or abscess.  3. Marrow edema of the fourth and fifth metatarsal heads is presumably reactive or stress related in the absence of nearby soft tissue wounds.  < end of copied text >    < from: US Duplex Venous Lower Ext Complete, Bilateral (03.12.21 @ 11:52) >  IMPRESSION:  Limited examination.  No evidence of deep venous thrombosis in either lower extremity.  < end of copied text >    < from: Xray Foot AP + Lateral + Oblique, Right (03.11.21 @ 17:16) >  IMPRESSION:  Soft tissue swelling. Plantar hindfoot region shallow soft tissue ulceration again noted. No tracking gas collections beyond the ulcer margins and no gross radiographic evidence for underlying osteomyelitis.  Redemonstrated chronic midfootdeformity with bulky ossific debris in dorsal talonavicular region.  Normal forefoot configuration and alignment with preserved joint spaces.  No discrete suspicious lytic or blastic lesions.  < end of copied text >        OTHER TESTS:     Patient is a 65y old  Female who presents with a chief complaint of Worsening R foot ulcer x 1 day (13 Mar 2021 08:11)    HPI:  65F, morbidly obese, ambulates with a walker due to spinal stenosis, history of DM2 on insulin x 26 years, Asthma with multiple hospitalizations, never intubated, old LLE DVT no longer on A/C x 5 years, Ovarian Ca s/p 2010 MEGAN, CKD3, Hyperlipidemia, HTN, HFpEF@70% on home O2 3L, Insomnia, Vit D Deficiency, the pt was seen by the visiting home MD and saw the R LE plantar ulcer worsening, the ulcer was lanced and drained at the pt's home and the pt was sent to the ED, [facility] for Abx IV and MRI of R LE. In the Ed, the pt was given Cefepime 2g IV and Vancomycin 1 G IV, she was seen by Podiatry. Their consult and recommendations are in the chart. The pt admits to Occipital HA, SOB, LEGER after ambulating 3 feet, non exertional, sub sternal 5/10, chest cramping sensation, that began at 4am on 3/11, subsiding on it's own and not returning, constipation with last BM, consider small on 3/10. Denies blurry vision, dizziness, falls, cough, palpitation, nausea, vomit, diarrhea, adnominal pain, dysuria, chills, generalized body aches. Compliant with medications and diet. Able to reconcile home meds.     Vitals : T max: 98.4 oral, HR= 80b/ min, BP = 180/ 60, RR= 16b/ min, SPO2= 96% ra  (11 Mar 2021 20:37)  =======  - Eval by Podiatry - rt foot plantar wound to SubQ, no purulence, no drainage, no malodor, no fluctuance, 3+ pitting edema, cellulitis to ankle, etiology diabetic neuropathy. Excisional debridement of wound performed down to SUbQ but not beyond using sterile # 15 blade, wound flushed, DSD applied. Wound culture taken. Podiatry recc MRI  - MRI negative for OM. ID consulted for recs  --------  - Above HPI reviewed and reconciled with patient  - reports chills prior to admission  - Denied fevers, rash, cough, coryza, dysuria, loose stools, recent travel, sick contacts, water exposure    prior hospital charts reviewed [x]  primary team notes reviewed [x]  other consultant notes reviewed [x]    PAST MEDICAL & SURGICAL HISTORY:  Gait difficulty  ~ u ses walker    On home oxygen therapy    Asthma    Uterine cancer    Congestive heart failure  ~ HFpEF    CKD (chronic kidney disease)  ~ III    Insomnia    Vitamin D deficiency    Morbid Obesity    Cataract    Dyslipidemia    Deep Vein Thrombosis (DVT)  Left leg, 2004, treated and resolved    Spinal Stenosis, Lumbar    Cervical Stenosis of Spine    Endometrial Hyperplasia    HTN (Hypertension)    Diabetes Mellitus Type II    S/P cholecystectomy    S/P appendectomy    S/P total abdominal hysterectomy and bilateral salpingo-oophorectomy    H/O colonoscopy  1998    H/O laser iridotomy  left eye, 2016    Endometrial biopsy 12/02/09    Tonsillectomy as a child    Cervical Spinal Stenosis surgery x2 (01/2002, 7/2002)    D&amp;C 2008  hysteroscopy, endometrial hyperplasia, 2009    D&amp;C x2 1980&#x27;s    Cholecystectomy/appendectomy @ age 25    C Section 1994    Cataract extracted with lens implant 1998  Right      Allergies  adhesives (Rash)  Bactrim (Flushing)  latex (Rash)  wool- rash, itch (Other)    ANTIMICROBIALS (past 90 days)  MEDICATIONS  (STANDING):  cefepime   IVPB   100 mL/Hr IV Intermittent (03-13-21 @ 05:32)   100 mL/Hr IV Intermittent (03-12-21 @ 17:43)   100 mL/Hr IV Intermittent (03-12-21 @ 05:30)    cefepime   IVPB   100 mL/Hr IV Intermittent (03-11-21 @ 18:21)    vancomycin  IVPB   250 mL/Hr IV Intermittent (03-11-21 @ 19:14)    vancomycin  IVPB   250 mL/Hr IV Intermittent (03-12-21 @ 17:43)      ANTIMICROBIALS:    cefepime   IVPB 2000 every 12 hours    OTHER MEDS: MEDICATIONS  (STANDING):  acetaminophen   Tablet .. 650 every 6 hours PRN  ALBUTerol    90 MICROgram(s) HFA Inhaler 2 every 6 hours PRN  aspirin enteric coated 81 daily  atorvastatin 80 at bedtime  budesonide 160 MICROgram(s)/formoterol 4.5 MICROgram(s) Inhaler 2 two times a day  clonazePAM  Tablet 0.5 at bedtime  dextrose 40% Gel 15 once  dextrose 50% Injectable 25 once  dextrose 50% Injectable 12.5 once  dextrose 50% Injectable 25 once  furosemide    Tablet 40 daily  glucagon  Injectable 1 once  heparin   Injectable 5000 every 8 hours  insulin detemir injectable (LEVEMIR) 64 two times a day  insulin lispro (ADMELOG) corrective regimen sliding scale  three times a day before meals  insulin lispro (ADMELOG) corrective regimen sliding scale  at bedtime  insulin lispro Injectable (ADMELOG) 12 three times a day before meals  ipratropium 17 MICROgram(s) HFA Inhaler 1 every 6 hours  ondansetron    Tablet 4 every 6 hours PRN  oxyCODONE    IR 7.5 every 8 hours PRN  oxyCODONE  ER Tablet 80 every 12 hours  polyethylene glycol 3350 17 daily  senna 2 at bedtime    SOCIAL HISTORY:  - lives alone  - has cat at home    FAMILY HISTORY:  Family history of lung cancer (Grandparent)  Family history of bladder cancer  Family history of pancreatic cancer      REVIEW OF SYSTEMS  [  ] ROS unobtainable because:    [x] All other systems negative except as noted below:	  Constitutional:  [ ] fever [x] chills  [ ] weight loss  [ ] weakness  Skin:  [ ] rash [ ] phlebitis	  Eyes: [ ] icterus [ ] pain  [ ] discharge	  ENMT: [ ] sore throat  [ ] thrush [ ] ulcers [ ] exudates  Respiratory: [ ] dyspnea [ ] hemoptysis [ ] cough [ ] sputum	  Cardiovascular:  [ ] chest pain [ ] palpitations [ ] edema	  Gastrointestinal:  [ ] nausea [ ] vomiting [ ] diarrhea [ ] constipation [ ] pain	  Genitourinary:  [ ] dysuria [ ] frequency [ ] hematuria [ ] discharge [ ] flank pain  [ ] incontinence  Musculoskeletal:  [ ] myalgias [ ] arthralgias [ ] arthritis  [ ] back pain [x] LLE wound  Neurological:  [ ] headache [ ] seizures  [ ] confusion/altered mental status  Psychiatric:  [ ] anxiety [ ] depression	  Hematology/Lymphatics:  [ ] lymphadenopathy  Endocrine:  [ ] adrenal [ ] thyroid  Allergic/Immunologic:	 [ ] transplant [ ] seasonal    Vital Signs Last 24 Hrs  T(F): 98.1 (03-13-21 @ 05:29), Max: 98.9 (03-12-21 @ 01:49)  Vital Signs Last 24 Hrs  HR: 71 (03-13-21 @ 05:29) (71 - 83)  BP: 109/50 (03-13-21 @ 05:29) (109/50 - 123/50)  RR: 18 (03-13-21 @ 05:29)  SpO2: 98% (03-13-21 @ 05:29) (98% - 99%)  Wt(kg): --    EXAM:  Constitutional: Not in acute distress. On NC 3l/min  Eyes: pupils bilaterally reactive to light. No icterus.  Oral cavity: Dental caries noted  Neck: No neck vein distension noted  RS: Coarse basilar breath sounds bilaterally. No wheeze/rhonchi.  CVS: S1, S2 heard. Regular rate and rhythm. No murmurs/rubs/gallops.  Abdomen: Soft. No guarding/rigidity/tenderness.  : No acute abnormalities  Extremities: Warm. No pedal edema  Skin: RLE in dressing  Patient had photos of RLE wound prior to hospital admission - chronic ulcer seen.  Vascular: No evidence of phlebitis  Neuro: Alert, oriented to time/place/person                          11.8   10.28 )-----------( 228      ( 12 Mar 2021 08:52 )             38.0     03-13    139  |  99  |  31<H>  ----------------------------<  177<H>  4.6   |  26  |  1.57<H>    Ca    9.7      13 Mar 2021 07:47  Phos  3.4     03-13  Mg     2.1     03-13    TPro  8.0  /  Alb  4.2  /  TBili  0.7  /  DBili  x   /  AST  26  /  ALT  19  /  AlkPhos  116  03-11    MICROBIOLOGY:  Vancomycin Level, Random: 9.2 (03-13 @ 07:47)  Vancomycin Level, Random: 4.5 (03-12 @ 14:58)    Culture - Blood (collected 11 Mar 2021 23:04)  Source: .Blood Blood-Venous  Preliminary Report (13 Mar 2021 01:02):    No growth to date.    Culture - Abscess with Gram Stain (collected 11 Mar 2021 18:36)  Source: .Abscess foot  Preliminary Report (12 Mar 2021 18:28):    Few Staphylococcus aureus    Rare Proteus mirabilis    Rare Serratia marcescens    Rare Klebsiella pneumoniae        RADIOLOGY:  imaging below personally reviewed  < from: MR Foot w/wo IV Cont, Right (03.12.21 @ 16:12) >  IMPRESSION:  1. Charcot changes of the midfoot and hindfoot are again seen.  2. Plantar cutaneous wounds appear to be present along the midfoot without underlying osteomyelitis or abscess.  3. Marrow edema of the fourth and fifth metatarsal heads is presumably reactive or stress related in the absence of nearby soft tissue wounds.  < end of copied text >    < from: US Duplex Venous Lower Ext Complete, Bilateral (03.12.21 @ 11:52) >  IMPRESSION:  Limited examination.  No evidence of deep venous thrombosis in either lower extremity.  < end of copied text >    < from: Xray Foot AP + Lateral + Oblique, Right (03.11.21 @ 17:16) >  IMPRESSION:  Soft tissue swelling. Plantar hindfoot region shallow soft tissue ulceration again noted. No tracking gas collections beyond the ulcer margins and no gross radiographic evidence for underlying osteomyelitis.  Redemonstrated chronic midfootdeformity with bulky ossific debris in dorsal talonavicular region.  Normal forefoot configuration and alignment with preserved joint spaces.  No discrete suspicious lytic or blastic lesions.  < end of copied text >        OTHER TESTS:

## 2021-03-14 LAB
ANION GAP SERPL CALC-SCNC: 10 MMOL/L — SIGNIFICANT CHANGE UP (ref 7–14)
BUN SERPL-MCNC: 35 MG/DL — HIGH (ref 7–23)
CALCIUM SERPL-MCNC: 9.4 MG/DL — SIGNIFICANT CHANGE UP (ref 8.4–10.5)
CHLORIDE SERPL-SCNC: 100 MMOL/L — SIGNIFICANT CHANGE UP (ref 98–107)
CO2 SERPL-SCNC: 28 MMOL/L — SIGNIFICANT CHANGE UP (ref 22–31)
CREAT SERPL-MCNC: 1.77 MG/DL — HIGH (ref 0.5–1.3)
GLUCOSE BLDC GLUCOMTR-MCNC: 161 MG/DL — HIGH (ref 70–99)
GLUCOSE BLDC GLUCOMTR-MCNC: 166 MG/DL — HIGH (ref 70–99)
GLUCOSE BLDC GLUCOMTR-MCNC: 170 MG/DL — HIGH (ref 70–99)
GLUCOSE BLDC GLUCOMTR-MCNC: 174 MG/DL — HIGH (ref 70–99)
GLUCOSE SERPL-MCNC: 187 MG/DL — HIGH (ref 70–99)
HCT VFR BLD CALC: 38.7 % — SIGNIFICANT CHANGE UP (ref 34.5–45)
HGB BLD-MCNC: 11.7 G/DL — SIGNIFICANT CHANGE UP (ref 11.5–15.5)
MAGNESIUM SERPL-MCNC: 2.1 MG/DL — SIGNIFICANT CHANGE UP (ref 1.6–2.6)
MCHC RBC-ENTMCNC: 28 PG — SIGNIFICANT CHANGE UP (ref 27–34)
MCHC RBC-ENTMCNC: 30.2 GM/DL — LOW (ref 32–36)
MCV RBC AUTO: 92.6 FL — SIGNIFICANT CHANGE UP (ref 80–100)
NRBC # BLD: 0 /100 WBCS — SIGNIFICANT CHANGE UP
NRBC # FLD: 0 K/UL — SIGNIFICANT CHANGE UP
PHOSPHATE SERPL-MCNC: 3.1 MG/DL — SIGNIFICANT CHANGE UP (ref 2.5–4.5)
PLATELET # BLD AUTO: 241 K/UL — SIGNIFICANT CHANGE UP (ref 150–400)
POTASSIUM SERPL-MCNC: 4.4 MMOL/L — SIGNIFICANT CHANGE UP (ref 3.5–5.3)
POTASSIUM SERPL-SCNC: 4.4 MMOL/L — SIGNIFICANT CHANGE UP (ref 3.5–5.3)
RBC # BLD: 4.18 M/UL — SIGNIFICANT CHANGE UP (ref 3.8–5.2)
RBC # FLD: 13.6 % — SIGNIFICANT CHANGE UP (ref 10.3–14.5)
SODIUM SERPL-SCNC: 138 MMOL/L — SIGNIFICANT CHANGE UP (ref 135–145)
VANCOMYCIN FLD-MCNC: 9.4 UG/ML — SIGNIFICANT CHANGE UP
WBC # BLD: 9.22 K/UL — SIGNIFICANT CHANGE UP (ref 3.8–10.5)
WBC # FLD AUTO: 9.22 K/UL — SIGNIFICANT CHANGE UP (ref 3.8–10.5)

## 2021-03-14 PROCEDURE — 99232 SBSQ HOSP IP/OBS MODERATE 35: CPT

## 2021-03-14 RX ORDER — VANCOMYCIN HCL 1 G
1000 VIAL (EA) INTRAVENOUS ONCE
Refills: 0 | Status: COMPLETED | OUTPATIENT
Start: 2021-03-14 | End: 2021-03-14

## 2021-03-14 RX ORDER — POLYETHYLENE GLYCOL 3350 17 G/17G
17 POWDER, FOR SOLUTION ORAL
Refills: 0 | Status: DISCONTINUED | OUTPATIENT
Start: 2021-03-14 | End: 2021-03-16

## 2021-03-14 RX ADMIN — SODIUM CHLORIDE 3 MILLILITER(S): 9 INJECTION INTRAMUSCULAR; INTRAVENOUS; SUBCUTANEOUS at 22:00

## 2021-03-14 RX ADMIN — OXYCODONE HYDROCHLORIDE 80 MILLIGRAM(S): 5 TABLET ORAL at 22:35

## 2021-03-14 RX ADMIN — Medication 2: at 12:28

## 2021-03-14 RX ADMIN — Medication 250 MILLIGRAM(S): at 12:28

## 2021-03-14 RX ADMIN — SENNA PLUS 2 TABLET(S): 8.6 TABLET ORAL at 22:35

## 2021-03-14 RX ADMIN — Medication 40 MILLIGRAM(S): at 05:41

## 2021-03-14 RX ADMIN — Medication 2000 UNIT(S): at 12:29

## 2021-03-14 RX ADMIN — ALBUTEROL 2 PUFF(S): 90 AEROSOL, METERED ORAL at 13:39

## 2021-03-14 RX ADMIN — SODIUM CHLORIDE 3 MILLILITER(S): 9 INJECTION INTRAMUSCULAR; INTRAVENOUS; SUBCUTANEOUS at 13:37

## 2021-03-14 RX ADMIN — NYSTATIN CREAM 1 APPLICATION(S): 100000 CREAM TOPICAL at 17:32

## 2021-03-14 RX ADMIN — ATORVASTATIN CALCIUM 80 MILLIGRAM(S): 80 TABLET, FILM COATED ORAL at 22:35

## 2021-03-14 RX ADMIN — OXYCODONE HYDROCHLORIDE 80 MILLIGRAM(S): 5 TABLET ORAL at 13:00

## 2021-03-14 RX ADMIN — Medication 1 PUFF(S): at 13:39

## 2021-03-14 RX ADMIN — SODIUM CHLORIDE 3 MILLILITER(S): 9 INJECTION INTRAMUSCULAR; INTRAVENOUS; SUBCUTANEOUS at 05:39

## 2021-03-14 RX ADMIN — Medication 2: at 17:50

## 2021-03-14 RX ADMIN — Medication 12 UNIT(S): at 17:50

## 2021-03-14 RX ADMIN — NYSTATIN CREAM 1 APPLICATION(S): 100000 CREAM TOPICAL at 05:40

## 2021-03-14 RX ADMIN — POLYETHYLENE GLYCOL 3350 17 GRAM(S): 17 POWDER, FOR SOLUTION ORAL at 12:29

## 2021-03-14 RX ADMIN — CEFEPIME 100 MILLIGRAM(S): 1 INJECTION, POWDER, FOR SOLUTION INTRAMUSCULAR; INTRAVENOUS at 05:41

## 2021-03-14 RX ADMIN — OXYCODONE HYDROCHLORIDE 7.5 MILLIGRAM(S): 5 TABLET ORAL at 04:21

## 2021-03-14 RX ADMIN — HEPARIN SODIUM 5000 UNIT(S): 5000 INJECTION INTRAVENOUS; SUBCUTANEOUS at 13:37

## 2021-03-14 RX ADMIN — OXYCODONE HYDROCHLORIDE 80 MILLIGRAM(S): 5 TABLET ORAL at 12:27

## 2021-03-14 RX ADMIN — OXYCODONE HYDROCHLORIDE 7.5 MILLIGRAM(S): 5 TABLET ORAL at 16:30

## 2021-03-14 RX ADMIN — Medication 64 UNIT(S): at 22:41

## 2021-03-14 RX ADMIN — HEPARIN SODIUM 5000 UNIT(S): 5000 INJECTION INTRAVENOUS; SUBCUTANEOUS at 05:41

## 2021-03-14 RX ADMIN — Medication 64 UNIT(S): at 08:42

## 2021-03-14 RX ADMIN — Medication 1 PUFF(S): at 03:47

## 2021-03-14 RX ADMIN — HEPARIN SODIUM 5000 UNIT(S): 5000 INJECTION INTRAVENOUS; SUBCUTANEOUS at 22:35

## 2021-03-14 RX ADMIN — BUDESONIDE AND FORMOTEROL FUMARATE DIHYDRATE 2 PUFF(S): 160; 4.5 AEROSOL RESPIRATORY (INHALATION) at 22:36

## 2021-03-14 RX ADMIN — OXYCODONE HYDROCHLORIDE 7.5 MILLIGRAM(S): 5 TABLET ORAL at 16:11

## 2021-03-14 RX ADMIN — Medication 12 UNIT(S): at 08:41

## 2021-03-14 RX ADMIN — Medication 12 UNIT(S): at 12:28

## 2021-03-14 RX ADMIN — POLYETHYLENE GLYCOL 3350 17 GRAM(S): 17 POWDER, FOR SOLUTION ORAL at 17:31

## 2021-03-14 RX ADMIN — CEFEPIME 100 MILLIGRAM(S): 1 INJECTION, POWDER, FOR SOLUTION INTRAMUSCULAR; INTRAVENOUS at 17:31

## 2021-03-14 RX ADMIN — Medication 2: at 08:42

## 2021-03-14 RX ADMIN — Medication 0.5 MILLIGRAM(S): at 22:35

## 2021-03-14 RX ADMIN — Medication 81 MILLIGRAM(S): at 12:29

## 2021-03-14 RX ADMIN — Medication 1 PUFF(S): at 22:36

## 2021-03-14 RX ADMIN — OXYCODONE HYDROCHLORIDE 7.5 MILLIGRAM(S): 5 TABLET ORAL at 03:51

## 2021-03-14 NOTE — DIETITIAN INITIAL EVALUATION ADULT. - OTHER INFO
Unable to wake up patient for consult. Spoke with RN. Per RN, pt is eating well >75% of meals. Denies any GI issues (nausea/vomiting/diarrhea/constipation.) Denies any chewing or swallowing difficulties at this time. POCT WNL.   Pt with recent admission Jan 2021 where RD provided heart healthy consistent carb diet education. Pt's A1c was 10.4 and current A1c s 8.5. Jan 2021 wt was 399 pounds. Current dosing wt 380 pounds.

## 2021-03-14 NOTE — PROGRESS NOTE ADULT - PROBLEM SELECTOR PLAN 2
Likely Opoid induced.  Last BM 3/10 consider small amount.  TSH WNL   Continue Miralax po QHS.   Senna 2 tabs QHS added to medication regimen. Likely Opoid induced.  Last BM 3/10 consider small amount.  TSH WNL   on Miralax po QHS., will change to BID for now   Senna 2 tabs QHS added to medication regimen.

## 2021-03-14 NOTE — DIETITIAN INITIAL EVALUATION ADULT. - PERTINENT LABORATORY DATA
03-14 Na 138 mmol/L Glu 187 mg/dL<H> K+ 4.4 mmol/L Cr 1.77 mg/dL<H> BUN 35 mg/dL<H> Phos 3.1 mg/dL  03-14 @ 12:18 POCT 166 mg/dL  03-14 @ 08:28 POCT 161 mg/dL  03-13 @ 22:25 POCT 222 mg/dL  03-13 @ 17:02 POCT 152 mg/dL

## 2021-03-14 NOTE — DIETITIAN INITIAL EVALUATION ADULT. - ADD RECOMMEND
1. D/c 60 gm protein restriction. Not indicated at this time. 2. F/u with education at more appropriate time.

## 2021-03-14 NOTE — PROGRESS NOTE ADULT - PROBLEM SELECTOR PLAN 1
-MRI negative for OM, podaitry f/u apprecaited  recommended ID eval for DC planning  -ID eval appreciated, recommend c/w continue cefepime 2gm!v q 12hrs   and vanco 1gm  IV X 1   -  B/L LE Duplex negative for DVT   - F/U Wound Cx & Blood Cx  - F/U PT, SW and wound care assessment. -MRI negative for OM, podaitry f/u apprecaited  -podiatry recommended ID eval for DC planning  -ID eval appreciated, recommend c/w continue cefepime 2gm!v q 12hrs   and vanco as per level  and On Dc can change to po keflex+ levaquin X 7 more days  -  B/L LE Duplex negative for DVT   -  Blood Cx NGTD   - Wound cx showing S. aureus, K. pneumoniae, P. mirablis, S. marascens. Past cx with MSSA and Finegoldia  - F/U PT, SW and wound care assessment.

## 2021-03-14 NOTE — PROGRESS NOTE ADULT - ASSESSMENT
65/F with PMH morbid obesity (ambulates with walker d/t spinal stenosis), DM on insulin, HTN, HLD, HFpEF, CKD3, Asthma, Pulm HTN (on NC 3l/min), old LLE DVT no longer on A/C x 5 years, Ovarian Ca s/p 2010 MEGAN, insomnia, Vit D Deficiency, chronic RLE wounds with Charcot arthropathy  - H/o chronic RLE wounds - recent admissions Nov 2020 and Jan 2021 - s/p PO abx  - Sent in by visiting Podiatrist for concern for worsening RLE plantar ulcer. Ulcer lanced and drained at home (no cx taken). H/o chills night before admission  - In ED, AVSS. Labs revealed WBC 10,570  - Eval by Podiatry - rt foot plantar wound to SubQ, no purulence, no drainage, no malodor, cellulitis to ankle. Excisional debridement performed down to SUbQ. Wound culture taken. MRI negative for OM. ID consulted for recs  =======  Diabetic foot wound infection  - Pt has DM (HbA1c 8.5) with underlying Charcot arthropathy - prone to recurrent infections  - Wound cx showing S. aureus, K. pneumoniae, P. mirablis, S. marascens. Past cx with MSSA and Finegoldia  - MRI revealed Charcot Arthropathy with no concern for OM  - sensis as above  Dc vanco  for now continue cefepime  On Dc can change to po keflex+ levaquin X 7 more days  adverse effects and poitential for progression to OM d/w pt  wound care per podiatry  Will tailor plan for ID issues  per course,results.Will defer to primary team on management of other issues.  Assessment, plan and recommendations as detailed above were discussed with the medical/primary  team.  Will Follow.  Beeper [phone] [facility] [id number].   Wknd/afterhours/No response-[phone] or Fellow on call

## 2021-03-14 NOTE — PROGRESS NOTE ADULT - SUBJECTIVE AND OBJECTIVE BOX
08/19/17 2100   Behavioral Health   Hallucinations denies / not responding to hallucinations   Thinking distractable   Orientation person: oriented;place: oriented;date: oriented;time: oriented   Memory baseline memory   Insight poor   Judgement impaired   Eye Contact at examiner   Affect blunted, flat   Mood mood is calm   Physical Appearance/Attire attire appropriate to age and situation   Hygiene well groomed   Suicidality (none reported)   Self Injury (none)   Elopement (no signs)   Activity (appropriate)   Speech clear;coherent   Medication Sensitivity no stated side effects;no observed side effects   Psychomotor / Gait balanced;steady   Activities of Daily Living   Hygiene/Grooming independent   Oral Hygiene independent   Dress independent   Room Organization independent   Activity   Activity Level of Assistance independent      Patient is a 65y old  Female who presents with a chief complaint of Worsening R foot ulcer x 1 day (13 Mar 2021 10:44)    Being followed by ID for R foot ulcer,cellulitis     Interval history:feels well  denies any pain   No acute events      ROS:  No cough,SOB,CP  No N/V/D./abd pain  No other complaints      Antimicrobials:    cefepime   IVPB 2000 milliGRAM(s) IV Intermittent every 12 hours  vancomycin  IVPB 1000 milliGRAM(s) IV Intermittent once    Other medications reviewed    Vital Signs Last 24 Hrs  T(C): 36.7 (03-14-21 @ 10:20), Max: 36.9 (03-13-21 @ 11:30)  T(F): 98 (03-14-21 @ 10:20), Max: 98.4 (03-13-21 @ 11:30)  HR: 74 (03-14-21 @ 10:20) (70 - 82)  BP: 106/56 (03-14-21 @ 10:20) (100/50 - 123/50)  BP(mean): --  RR: 18 (03-14-21 @ 10:20) (16 - 18)  SpO2: 99% (03-14-21 @ 10:20) (95% - 100%)    Physical Exam:        HEENT PERRLA EOMI    No oral exudate or erythema    Chest Good AE,CTA    CVS RRR S1 S2 WNl No murmur or rub or gallop    Abd soft BS normal No tenderness no masses    IV site no erythema tenderness or discharge  R foot dressing- no discharge  bilateral stasis     CNS AAO X 3 no focal    Lab Data:                          11.7   9.22  )-----------( 241      ( 14 Mar 2021 07:31 )             38.7       03-14    138  |  100  |  35<H>  ----------------------------<  187<H>  4.4   |  28  |  1.77<H>    Ca    9.4      14 Mar 2021 07:31  Phos  3.1     03-14  Mg     2.1     03-14          Culture - Blood (collected 11 Mar 2021 23:04)  Source: .Blood Blood-Venous  Preliminary Report (13 Mar 2021 01:02):    No growth to date.    Culture - Abscess with Gram Stain (collected 11 Mar 2021 18:36)  Source: .Abscess foot  Preliminary Report (12 Mar 2021 18:28):    Few Staphylococcus aureus    Rare Proteus mirabilis    Rare Serratia marcescens    Rare Klebsiella pneumoniae  Organism: Staphylococcus aureus  Proteus mirabilis  Serratia marcescens  Klebsiella pneumoniae (13 Mar 2021 17:35)  Organism: Klebsiella pneumoniae (13 Mar 2021 17:35)      -  Amikacin: S <=16      -  Amoxicillin/Clavulanic Acid: S <=8/4      -  Ampicillin: R >16 These ampicillin results predict results for amoxicillin      -  Ampicillin/Sulbactam: S <=4/2 Enterobacter, Citrobacter, and Serratia may develop resistance during prolonged therapy (3-4 days)      -  Aztreonam: S <=4      -  Cefazolin: S <=2 Enterobacter, Citrobacter, and Serratia may develop resistance during prolonged therapy (3-4 days)      -  Cefepime: S <=2      -  Cefoxitin: S <=8      -  Ceftriaxone: S <=1 Enterobacter, Citrobacter, and Serratia may develop resistance during prolonged therapy      -  Ciprofloxacin: S <=0.25      -  Ertapenem: S <=0.5      -  Gentamicin: S <=2      -  Imipenem: S <=1      -  Levofloxacin: S <=0.5      -  Meropenem: S <=1      -  Piperacillin/Tazobactam: S <=8      -  Tobramycin: S <=2      -  Trimethoprim/Sulfamethoxazole: S <=0.5/9.5      Method Type: DARREL  Organism: Serratia marcescens (13 Mar 2021 17:35)      -  Amikacin: S <=16      -  Amoxicillin/Clavulanic Acid: R >16/8      -  Ampicillin: R >16 These ampicillin results predict results for amoxicillin      -  Ampicillin/Sulbactam: R 16/8 Enterobacter, Citrobacter, and Serratia may develop resistance during prolonged therapy (3-4 days)      -  Aztreonam: S <=4      -  Cefazolin: R >16 Enterobacter, Citrobacter, and Serratia may develop resistance during prolonged therapy (3-4 days)      -  Cefepime: S <=2      -  Cefoxitin: R <=8      -  Ceftriaxone: S <=1 Enterobacter, Citrobacter, and Serratia may develop resistance during prolonged therapy      -  Ciprofloxacin: S <=0.25      -  Ertapenem: S <=0.5      -  Gentamicin: S <=2      -  Levofloxacin: S <=0.5      -  Meropenem: S <=1      -  Piperacillin/Tazobactam: S <=8      -  Tobramycin: S <=2      -  Trimethoprim/Sulfamethoxazole: S <=0.5/9.5      Method Type: DARREL  Organism: Proteus mirabilis (13 Mar 2021 17:34)      -  Amikacin: S <=16      -  Amoxicillin/Clavulanic Acid: S <=8/4      -  Ampicillin: S <=8 These ampicillin results predict results for amoxicillin      -  Ampicillin/Sulbactam: S <=4/2 Enterobacter, Citrobacter, and Serratia may develop resistance during prolonged therapy (3-4 days)      -  Aztreonam: S <=4      -  Cefazolin: S <=2 Enterobacter, Citrobacter, and Serratia may develop resistance during prolonged therapy (3-4 days)      -  Cefepime: S <=2      -  Cefoxitin: S <=8      -  Ceftriaxone: S <=1 Enterobacter, Citrobacter, and Serratia may develop resistance during prolonged therapy      -  Ciprofloxacin: S <=0.25      -  Ertapenem: S <=0.5      -  Gentamicin: S <=2      -  Levofloxacin: S <=0.5      -  Meropenem: S <=1      -  Piperacillin/Tazobactam: S <=8      -  Tobramycin: S <=2      -  Trimethoprim/Sulfamethoxazole: S <=0.5/9.5      Method Type: DARREL  Organism: Staphylococcus aureus (13 Mar 2021 17:34)      -  Ampicillin/Sulbactam: S <=8/4      -  Cefazolin: S <=4      -  Clindamycin: S <=0.25      -  Erythromycin: S <=0.25      -  Gentamicin: S <=1 Should not be used as monotherapy      -  Oxacillin: S <=0.25      -  Penicillin: R 4      -  RIF- Rifampin: S <=1 Should not be used as monotherapy      -  Tetra/Doxy: S <=1      -  Trimethoprim/Sulfamethoxazole: S <=0.5/9.5      -  Vancomycin: S 1      Method Type: DARREL            Vancomycin Level, Random: 9.4 ug/mL (03-14-21 @ 07:31)  Vancomycin Level, Random: 9.2 ug/mL (03-13-21 @ 07:47)  Vancomycin Level, Random: 4.5 ug/mL (03-12-21 @ 14:58)    < from: MR Foot w/wo IV Cont, Right (03.12.21 @ 16:12) >  IMPRESSION:  1. Charcot changes of the midfoot and hindfoot are again seen.  2. Plantar cutaneous wounds appear to be present along the midfoot without underlying osteomyelitis or abscess.  3. Marrow edema of the fourth and fifth metatarsal heads is presumably reactive or stress related in the absence of nearby soft tissue wounds.        < end of copied text >

## 2021-03-14 NOTE — DIETITIAN INITIAL EVALUATION ADULT. - PERTINENT MEDS FT
MEDICATIONS  (STANDING):  aspirin enteric coated 81 milliGRAM(s) Oral daily  atorvastatin 80 milliGRAM(s) Oral at bedtime  budesonide 160 MICROgram(s)/formoterol 4.5 MICROgram(s) Inhaler 2 Puff(s) Inhalation two times a day  cefepime   IVPB 2000 milliGRAM(s) IV Intermittent every 12 hours  cholecalciferol 2000 Unit(s) Oral daily  clonazePAM  Tablet 0.5 milliGRAM(s) Oral at bedtime  dextrose 40% Gel 15 Gram(s) Oral once  dextrose 5%. 1000 milliLiter(s) (50 mL/Hr) IV Continuous <Continuous>  dextrose 5%. 1000 milliLiter(s) (100 mL/Hr) IV Continuous <Continuous>  dextrose 50% Injectable 25 Gram(s) IV Push once  dextrose 50% Injectable 12.5 Gram(s) IV Push once  dextrose 50% Injectable 25 Gram(s) IV Push once  furosemide    Tablet 40 milliGRAM(s) Oral daily  glucagon  Injectable 1 milliGRAM(s) IntraMuscular once  heparin   Injectable 5000 Unit(s) SubCutaneous every 8 hours  insulin detemir injectable (LEVEMIR) 64 Unit(s) SubCutaneous two times a day  insulin lispro (ADMELOG) corrective regimen sliding scale   SubCutaneous three times a day before meals  insulin lispro (ADMELOG) corrective regimen sliding scale   SubCutaneous at bedtime  insulin lispro Injectable (ADMELOG) 12 Unit(s) SubCutaneous three times a day before meals  ipratropium 17 MICROgram(s) HFA Inhaler 1 Puff(s) Inhalation every 6 hours  nystatin Powder 1 Application(s) Topical two times a day  oxyCODONE  ER Tablet 80 milliGRAM(s) Oral every 12 hours  polyethylene glycol 3350 17 Gram(s) Oral daily  senna 2 Tablet(s) Oral at bedtime  sodium chloride 0.9% lock flush 3 milliLiter(s) IV Push every 8 hours

## 2021-03-14 NOTE — PROGRESS NOTE ADULT - SUBJECTIVE AND OBJECTIVE BOX
Patient is a 65y old  Female who presents with a chief complaint of Worsening R foot ulcer x 1 day (13 Mar 2021 10:44)      SUBJECTIVE / OVERNIGHT EVENTS:    MEDICATIONS  (STANDING):  aspirin enteric coated 81 milliGRAM(s) Oral daily  atorvastatin 80 milliGRAM(s) Oral at bedtime  budesonide 160 MICROgram(s)/formoterol 4.5 MICROgram(s) Inhaler 2 Puff(s) Inhalation two times a day  cefepime   IVPB 2000 milliGRAM(s) IV Intermittent every 12 hours  cholecalciferol 2000 Unit(s) Oral daily  clonazePAM  Tablet 0.5 milliGRAM(s) Oral at bedtime  dextrose 40% Gel 15 Gram(s) Oral once  dextrose 5%. 1000 milliLiter(s) (50 mL/Hr) IV Continuous <Continuous>  dextrose 5%. 1000 milliLiter(s) (100 mL/Hr) IV Continuous <Continuous>  dextrose 50% Injectable 25 Gram(s) IV Push once  dextrose 50% Injectable 12.5 Gram(s) IV Push once  dextrose 50% Injectable 25 Gram(s) IV Push once  furosemide    Tablet 40 milliGRAM(s) Oral daily  glucagon  Injectable 1 milliGRAM(s) IntraMuscular once  heparin   Injectable 5000 Unit(s) SubCutaneous every 8 hours  insulin detemir injectable (LEVEMIR) 64 Unit(s) SubCutaneous two times a day  insulin lispro (ADMELOG) corrective regimen sliding scale   SubCutaneous three times a day before meals  insulin lispro (ADMELOG) corrective regimen sliding scale   SubCutaneous at bedtime  insulin lispro Injectable (ADMELOG) 12 Unit(s) SubCutaneous three times a day before meals  ipratropium 17 MICROgram(s) HFA Inhaler 1 Puff(s) Inhalation every 6 hours  nystatin Powder 1 Application(s) Topical two times a day  oxyCODONE  ER Tablet 80 milliGRAM(s) Oral every 12 hours  polyethylene glycol 3350 17 Gram(s) Oral daily  senna 2 Tablet(s) Oral at bedtime  sodium chloride 0.9% lock flush 3 milliLiter(s) IV Push every 8 hours  vancomycin  IVPB 1000 milliGRAM(s) IV Intermittent once    MEDICATIONS  (PRN):  acetaminophen   Tablet .. 650 milliGRAM(s) Oral every 6 hours PRN Temp greater or equal to 38C (100.4F), Mild Pain (1 - 3)  ALBUTerol    90 MICROgram(s) HFA Inhaler 2 Puff(s) Inhalation every 6 hours PRN Shortness of Breath and/or Wheezing  ondansetron    Tablet 4 milliGRAM(s) Oral every 6 hours PRN Nausea  oxyCODONE    IR 7.5 milliGRAM(s) Oral every 8 hours PRN moderateand sever pain      Vital Signs Last 24 Hrs  T(C): 36.7 (14 Mar 2021 10:20), Max: 36.9 (13 Mar 2021 11:30)  T(F): 98 (14 Mar 2021 10:20), Max: 98.4 (13 Mar 2021 11:30)  HR: 74 (14 Mar 2021 10:20) (70 - 82)  BP: 106/56 (14 Mar 2021 10:20) (100/50 - 123/50)  BP(mean): --  RR: 18 (14 Mar 2021 10:20) (16 - 18)  SpO2: 99% (14 Mar 2021 10:20) (95% - 100%)  CAPILLARY BLOOD GLUCOSE      POCT Blood Glucose.: 161 mg/dL (14 Mar 2021 08:28)  POCT Blood Glucose.: 222 mg/dL (13 Mar 2021 22:25)  POCT Blood Glucose.: 152 mg/dL (13 Mar 2021 17:02)  POCT Blood Glucose.: 177 mg/dL (13 Mar 2021 12:29)    I&O's Summary    13 Mar 2021 06:01  -  14 Mar 2021 07:00  --------------------------------------------------------  IN: 0 mL / OUT: 800 mL / NET: -800 mL        PHYSICAL EXAM:  GENERAL: NAD, well-developed  HEAD:  Atraumatic, Normocephalic  EYES: EOMI, PERRLA, conjunctiva and sclera clear  NECK: Supple, No JVD  CHEST/LUNG: Clear to auscultation bilaterally; No wheeze  HEART: Regular rate and rhythm; No murmurs, rubs, or gallops  ABDOMEN: Soft, Nontender, Nondistended; Bowel sounds present  EXTREMITIES:  2+ Peripheral Pulses, No clubbing, cyanosis, or edema  PSYCH: AAOx3  NEUROLOGY: non-focal  SKIN: No rashes or lesions    LABS:                        11.7   9.22  )-----------( 241      ( 14 Mar 2021 07:31 )             38.7     03-14    138  |  100  |  35<H>  ----------------------------<  187<H>  4.4   |  28  |  1.77<H>    Ca    9.4      14 Mar 2021 07:31  Phos  3.1     03-14  Mg     2.1     03-14                RADIOLOGY & ADDITIONAL TESTS:    Imaging Personally Reviewed:    Consultant(s) Notes Reviewed:      Care Discussed with Consultants/Other Providers:   Patient is a 65y old  Female who presents with a chief complaint of Worsening R foot ulcer x 1 day (13 Mar 2021 10:44)      SUBJECTIVE / OVERNIGHT EVENTS: patient seen and examined by bedside, no acute complain today, denies headache, dizziness, SOB, CP, Palpitations , N/V/D, abdominal pain  pt had no BM for 2 days ,   pt asking for recliner chair        MEDICATIONS  (STANDING):  aspirin enteric coated 81 milliGRAM(s) Oral daily  atorvastatin 80 milliGRAM(s) Oral at bedtime  budesonide 160 MICROgram(s)/formoterol 4.5 MICROgram(s) Inhaler 2 Puff(s) Inhalation two times a day  cefepime   IVPB 2000 milliGRAM(s) IV Intermittent every 12 hours  cholecalciferol 2000 Unit(s) Oral daily  clonazePAM  Tablet 0.5 milliGRAM(s) Oral at bedtime  dextrose 40% Gel 15 Gram(s) Oral once  dextrose 5%. 1000 milliLiter(s) (50 mL/Hr) IV Continuous <Continuous>  dextrose 5%. 1000 milliLiter(s) (100 mL/Hr) IV Continuous <Continuous>  dextrose 50% Injectable 25 Gram(s) IV Push once  dextrose 50% Injectable 12.5 Gram(s) IV Push once  dextrose 50% Injectable 25 Gram(s) IV Push once  furosemide    Tablet 40 milliGRAM(s) Oral daily  glucagon  Injectable 1 milliGRAM(s) IntraMuscular once  heparin   Injectable 5000 Unit(s) SubCutaneous every 8 hours  insulin detemir injectable (LEVEMIR) 64 Unit(s) SubCutaneous two times a day  insulin lispro (ADMELOG) corrective regimen sliding scale   SubCutaneous three times a day before meals  insulin lispro (ADMELOG) corrective regimen sliding scale   SubCutaneous at bedtime  insulin lispro Injectable (ADMELOG) 12 Unit(s) SubCutaneous three times a day before meals  ipratropium 17 MICROgram(s) HFA Inhaler 1 Puff(s) Inhalation every 6 hours  nystatin Powder 1 Application(s) Topical two times a day  oxyCODONE  ER Tablet 80 milliGRAM(s) Oral every 12 hours  polyethylene glycol 3350 17 Gram(s) Oral daily  senna 2 Tablet(s) Oral at bedtime  sodium chloride 0.9% lock flush 3 milliLiter(s) IV Push every 8 hours  vancomycin  IVPB 1000 milliGRAM(s) IV Intermittent once    MEDICATIONS  (PRN):  acetaminophen   Tablet .. 650 milliGRAM(s) Oral every 6 hours PRN Temp greater or equal to 38C (100.4F), Mild Pain (1 - 3)  ALBUTerol    90 MICROgram(s) HFA Inhaler 2 Puff(s) Inhalation every 6 hours PRN Shortness of Breath and/or Wheezing  ondansetron    Tablet 4 milliGRAM(s) Oral every 6 hours PRN Nausea  oxyCODONE    IR 7.5 milliGRAM(s) Oral every 8 hours PRN moderateand sever pain      Vital Signs Last 24 Hrs  T(C): 36.7 (14 Mar 2021 10:20), Max: 36.9 (13 Mar 2021 11:30)  T(F): 98 (14 Mar 2021 10:20), Max: 98.4 (13 Mar 2021 11:30)  HR: 74 (14 Mar 2021 10:20) (70 - 82)  BP: 106/56 (14 Mar 2021 10:20) (100/50 - 123/50)  BP(mean): --  RR: 18 (14 Mar 2021 10:20) (16 - 18)  SpO2: 99% (14 Mar 2021 10:20) (95% - 100%)  CAPILLARY BLOOD GLUCOSE      POCT Blood Glucose.: 161 mg/dL (14 Mar 2021 08:28)  POCT Blood Glucose.: 222 mg/dL (13 Mar 2021 22:25)  POCT Blood Glucose.: 152 mg/dL (13 Mar 2021 17:02)  POCT Blood Glucose.: 177 mg/dL (13 Mar 2021 12:29)    I&O's Summary    13 Mar 2021 06:01  -  14 Mar 2021 07:00  --------------------------------------------------------  IN: 0 mL / OUT: 800 mL / NET: -800 mL        PHYSICAL EXAM:  GENERAL: NAD, morbidly obese ,lying in bed with O2 via  NC   HEAD:  Atraumatic, Normocephalic  EYES: EOMI, PERRLA, conjunctiva and sclera clear  CHEST/LUNG: Clear to auscultation bilaterally; No wheeze  HEART: Regular rate and rhythm;   ABDOMEN: Soft, Nontender, Nondistended; Bowel sounds present  EXTREMITIES:  2+ Peripheral Pulses, , B/L LE swelling, R LE > L LE, erythema present B/L   PSYCH: AAOx3  NEUROLOGY: non-focal  SKIN: Rt foot with dressing     LABS:                        11.7   9.22  )-----------( 241      ( 14 Mar 2021 07:31 )             38.7     03-14    138  |  100  |  35<H>  ----------------------------<  187<H>  4.4   |  28  |  1.77<H>    Ca    9.4      14 Mar 2021 07:31  Phos  3.1     03-14  Mg     2.1     03-14                RADIOLOGY & ADDITIONAL TESTS:    Imaging Personally Reviewed:    Consultant(s) Notes Reviewed:  ID     Care Discussed with Consultants/Other Providers:

## 2021-03-14 NOTE — DIETITIAN INITIAL EVALUATION ADULT. - PROBLEM SELECTOR PROBLEM 4
"Ursula Santos is a 26 y.o. female here today for follow-up of dysmenorrhea after undergoing diagnostic laparoscopy on September 23.  She has done well since her procedure and denies any abdominal pain, problems with her incisions, or abnormal vaginal bleeding.  She has not had a period since her procedure, but does report that she had symptoms immediately after surgery similar to her dysmenorrhea.    Visit Vitals  /88 (BP Location: Left arm, Patient Position: Sitting)   Ht 167.5 cm (65.95\")   Wt 75.8 kg (167 lb)   LMP 09/15/2020 (Exact Date)   BMI 27.00 kg/m²     Pleasant female no acute distress  Mood and affect normal  Breathing unlabored  Her abdominal incisions are well-healed    Pathology report shows no evidence of endometriosis    Assessment: Dysmenorrhea, postop check    We reviewed the findings at surgery showing a few suspicious sclerotic lesions along the left uterosacral ligament.  Although the pathology report was negative, it does not entirely rule out endometriosis.  It is curious that her symptoms after surgery were similar to her dysmenorrhea symptoms.  Hopefully cauterization of the visible lesions will lead to symptom improvement.  If her symptoms recur or have not improved then we will consider hormonal suppression.      " Essential hypertension

## 2021-03-14 NOTE — PROGRESS NOTE ADULT - PROBLEM SELECTOR PLAN 4
BP = 180/ 60.  Low salt diet.   Pt no longer on Lisinopril due to CKD.  s/p  Norvasc 5 mg po x  1  bp currently stable, will continue to monitor BP controlled   Low salt diet.   Pt no longer on Lisinopril due to CKD.  s/p  Norvasc 5 mg po x  1  bp currently stable, will continue to monitor, pt on lasix

## 2021-03-14 NOTE — DIETITIAN INITIAL EVALUATION ADULT. - PROBLEM SELECTOR PLAN 2
Likely Opoid induced.  Last BM 3/10 consider small amount.  F/U TSH  Continue Miralax po QHS.   Senna 2 tabs QHS added to medication regimen.

## 2021-03-15 ENCOUNTER — TRANSCRIPTION ENCOUNTER (OUTPATIENT)
Age: 66
End: 2021-03-15

## 2021-03-15 LAB
ANION GAP SERPL CALC-SCNC: 10 MMOL/L — SIGNIFICANT CHANGE UP (ref 7–14)
BUN SERPL-MCNC: 36 MG/DL — HIGH (ref 7–23)
CALCIUM SERPL-MCNC: 9.6 MG/DL — SIGNIFICANT CHANGE UP (ref 8.4–10.5)
CHLORIDE SERPL-SCNC: 101 MMOL/L — SIGNIFICANT CHANGE UP (ref 98–107)
CO2 SERPL-SCNC: 29 MMOL/L — SIGNIFICANT CHANGE UP (ref 22–31)
CREAT SERPL-MCNC: 1.71 MG/DL — HIGH (ref 0.5–1.3)
GLUCOSE BLDC GLUCOMTR-MCNC: 144 MG/DL — HIGH (ref 70–99)
GLUCOSE BLDC GLUCOMTR-MCNC: 165 MG/DL — HIGH (ref 70–99)
GLUCOSE BLDC GLUCOMTR-MCNC: 183 MG/DL — HIGH (ref 70–99)
GLUCOSE BLDC GLUCOMTR-MCNC: 189 MG/DL — HIGH (ref 70–99)
GLUCOSE BLDC GLUCOMTR-MCNC: 229 MG/DL — HIGH (ref 70–99)
GLUCOSE SERPL-MCNC: 157 MG/DL — HIGH (ref 70–99)
HCT VFR BLD CALC: 38.4 % — SIGNIFICANT CHANGE UP (ref 34.5–45)
HGB BLD-MCNC: 11.6 G/DL — SIGNIFICANT CHANGE UP (ref 11.5–15.5)
MAGNESIUM SERPL-MCNC: 2.1 MG/DL — SIGNIFICANT CHANGE UP (ref 1.6–2.6)
MCHC RBC-ENTMCNC: 28.2 PG — SIGNIFICANT CHANGE UP (ref 27–34)
MCHC RBC-ENTMCNC: 30.2 GM/DL — LOW (ref 32–36)
MCV RBC AUTO: 93.2 FL — SIGNIFICANT CHANGE UP (ref 80–100)
NRBC # BLD: 0 /100 WBCS — SIGNIFICANT CHANGE UP
NRBC # FLD: 0 K/UL — SIGNIFICANT CHANGE UP
PHOSPHATE SERPL-MCNC: 3.4 MG/DL — SIGNIFICANT CHANGE UP (ref 2.5–4.5)
PLATELET # BLD AUTO: 252 K/UL — SIGNIFICANT CHANGE UP (ref 150–400)
POTASSIUM SERPL-MCNC: 4.6 MMOL/L — SIGNIFICANT CHANGE UP (ref 3.5–5.3)
POTASSIUM SERPL-SCNC: 4.6 MMOL/L — SIGNIFICANT CHANGE UP (ref 3.5–5.3)
RBC # BLD: 4.12 M/UL — SIGNIFICANT CHANGE UP (ref 3.8–5.2)
RBC # FLD: 13.6 % — SIGNIFICANT CHANGE UP (ref 10.3–14.5)
SODIUM SERPL-SCNC: 140 MMOL/L — SIGNIFICANT CHANGE UP (ref 135–145)
VANCOMYCIN FLD-MCNC: 10.8 UG/ML — SIGNIFICANT CHANGE UP
WBC # BLD: 9.48 K/UL — SIGNIFICANT CHANGE UP (ref 3.8–10.5)
WBC # FLD AUTO: 9.48 K/UL — SIGNIFICANT CHANGE UP (ref 3.8–10.5)

## 2021-03-15 PROCEDURE — 99232 SBSQ HOSP IP/OBS MODERATE 35: CPT

## 2021-03-15 RX ORDER — ONDANSETRON 8 MG/1
0 TABLET, FILM COATED ORAL
Qty: 0 | Refills: 2 | DISCHARGE

## 2021-03-15 RX ORDER — INSULIN DETEMIR 100/ML (3)
80 INSULIN PEN (ML) SUBCUTANEOUS
Qty: 0 | Refills: 0 | DISCHARGE

## 2021-03-15 RX ORDER — POLYETHYLENE GLYCOL 3350 17 G/17G
17 POWDER, FOR SOLUTION ORAL
Qty: 0 | Refills: 0 | DISCHARGE
Start: 2021-03-15 | End: 2021-04-13

## 2021-03-15 RX ORDER — HYDROCODONE BITARTRATE AND ACETAMINOPHEN 7.5; 325 MG/15ML; MG/15ML
0 SOLUTION ORAL
Qty: 0 | Refills: 0 | DISCHARGE

## 2021-03-15 RX ORDER — CEPHALEXIN 500 MG
1 CAPSULE ORAL
Qty: 21 | Refills: 0
Start: 2021-03-15 | End: 2021-03-21

## 2021-03-15 RX ORDER — ACETAMINOPHEN 500 MG
2 TABLET ORAL
Qty: 0 | Refills: 0 | DISCHARGE
Start: 2021-03-15

## 2021-03-15 RX ORDER — ONDANSETRON 8 MG/1
1 TABLET, FILM COATED ORAL
Qty: 0 | Refills: 0 | DISCHARGE
Start: 2021-03-15 | End: 2021-03-19

## 2021-03-15 RX ORDER — LACTULOSE 10 G/15ML
20 SOLUTION ORAL ONCE
Refills: 0 | Status: COMPLETED | OUTPATIENT
Start: 2021-03-15 | End: 2021-03-15

## 2021-03-15 RX ORDER — NYSTATIN CREAM 100000 [USP'U]/G
1 CREAM TOPICAL
Qty: 0 | Refills: 0 | DISCHARGE
Start: 2021-03-15

## 2021-03-15 RX ORDER — SENNA PLUS 8.6 MG/1
2 TABLET ORAL
Qty: 0 | Refills: 0 | DISCHARGE
Start: 2021-03-15

## 2021-03-15 RX ORDER — OXYCODONE HYDROCHLORIDE 5 MG/1
1 TABLET ORAL
Qty: 8 | Refills: 0
Start: 2021-03-15 | End: 2021-03-16

## 2021-03-15 RX ORDER — CEPHALEXIN 500 MG
500 CAPSULE ORAL EVERY 8 HOURS
Refills: 0 | Status: DISCONTINUED | OUTPATIENT
Start: 2021-03-15 | End: 2021-03-16

## 2021-03-15 RX ORDER — FUROSEMIDE 40 MG
1 TABLET ORAL
Qty: 0 | Refills: 0 | DISCHARGE
Start: 2021-03-15

## 2021-03-15 RX ORDER — ONDANSETRON 8 MG/1
1 TABLET, FILM COATED ORAL
Qty: 15 | Refills: 0
Start: 2021-03-15 | End: 2021-03-19

## 2021-03-15 RX ORDER — OXYCODONE HYDROCHLORIDE 5 MG/1
1 TABLET ORAL
Qty: 15 | Refills: 0
Start: 2021-03-15 | End: 2021-03-19

## 2021-03-15 RX ORDER — FUROSEMIDE 40 MG
1 TABLET ORAL
Qty: 0 | Refills: 0 | DISCHARGE

## 2021-03-15 RX ORDER — INSULIN ASPART 100 [IU]/ML
15 INJECTION, SOLUTION SUBCUTANEOUS
Qty: 0 | Refills: 0 | DISCHARGE

## 2021-03-15 RX ORDER — POLYETHYLENE GLYCOL 3350 17 G/17G
17 POWDER, FOR SOLUTION ORAL
Qty: 1020 | Refills: 0
Start: 2021-03-15 | End: 2021-04-13

## 2021-03-15 RX ADMIN — OXYCODONE HYDROCHLORIDE 80 MILLIGRAM(S): 5 TABLET ORAL at 12:24

## 2021-03-15 RX ADMIN — Medication 1 PUFF(S): at 03:01

## 2021-03-15 RX ADMIN — SODIUM CHLORIDE 3 MILLILITER(S): 9 INJECTION INTRAMUSCULAR; INTRAVENOUS; SUBCUTANEOUS at 14:33

## 2021-03-15 RX ADMIN — Medication 2: at 12:24

## 2021-03-15 RX ADMIN — Medication 1 PUFF(S): at 09:58

## 2021-03-15 RX ADMIN — Medication 500 MILLIGRAM(S): at 21:48

## 2021-03-15 RX ADMIN — POLYETHYLENE GLYCOL 3350 17 GRAM(S): 17 POWDER, FOR SOLUTION ORAL at 17:39

## 2021-03-15 RX ADMIN — Medication 0.5 MILLIGRAM(S): at 21:47

## 2021-03-15 RX ADMIN — OXYCODONE HYDROCHLORIDE 7.5 MILLIGRAM(S): 5 TABLET ORAL at 03:30

## 2021-03-15 RX ADMIN — OXYCODONE HYDROCHLORIDE 80 MILLIGRAM(S): 5 TABLET ORAL at 22:47

## 2021-03-15 RX ADMIN — Medication 40 MILLIGRAM(S): at 06:33

## 2021-03-15 RX ADMIN — Medication 4: at 17:38

## 2021-03-15 RX ADMIN — SODIUM CHLORIDE 3 MILLILITER(S): 9 INJECTION INTRAMUSCULAR; INTRAVENOUS; SUBCUTANEOUS at 06:33

## 2021-03-15 RX ADMIN — Medication 12 UNIT(S): at 08:45

## 2021-03-15 RX ADMIN — OXYCODONE HYDROCHLORIDE 7.5 MILLIGRAM(S): 5 TABLET ORAL at 16:12

## 2021-03-15 RX ADMIN — CEFEPIME 100 MILLIGRAM(S): 1 INJECTION, POWDER, FOR SOLUTION INTRAMUSCULAR; INTRAVENOUS at 06:34

## 2021-03-15 RX ADMIN — Medication 64 UNIT(S): at 09:57

## 2021-03-15 RX ADMIN — BUDESONIDE AND FORMOTEROL FUMARATE DIHYDRATE 2 PUFF(S): 160; 4.5 AEROSOL RESPIRATORY (INHALATION) at 08:46

## 2021-03-15 RX ADMIN — POLYETHYLENE GLYCOL 3350 17 GRAM(S): 17 POWDER, FOR SOLUTION ORAL at 06:33

## 2021-03-15 RX ADMIN — Medication 81 MILLIGRAM(S): at 12:23

## 2021-03-15 RX ADMIN — ALBUTEROL 2 PUFF(S): 90 AEROSOL, METERED ORAL at 21:48

## 2021-03-15 RX ADMIN — Medication 12 UNIT(S): at 12:24

## 2021-03-15 RX ADMIN — BUDESONIDE AND FORMOTEROL FUMARATE DIHYDRATE 2 PUFF(S): 160; 4.5 AEROSOL RESPIRATORY (INHALATION) at 21:48

## 2021-03-15 RX ADMIN — SENNA PLUS 2 TABLET(S): 8.6 TABLET ORAL at 21:47

## 2021-03-15 RX ADMIN — ATORVASTATIN CALCIUM 80 MILLIGRAM(S): 80 TABLET, FILM COATED ORAL at 21:47

## 2021-03-15 RX ADMIN — HEPARIN SODIUM 5000 UNIT(S): 5000 INJECTION INTRAVENOUS; SUBCUTANEOUS at 06:34

## 2021-03-15 RX ADMIN — Medication 1 ENEMA: at 14:45

## 2021-03-15 RX ADMIN — NYSTATIN CREAM 1 APPLICATION(S): 100000 CREAM TOPICAL at 06:33

## 2021-03-15 RX ADMIN — OXYCODONE HYDROCHLORIDE 7.5 MILLIGRAM(S): 5 TABLET ORAL at 17:12

## 2021-03-15 RX ADMIN — HEPARIN SODIUM 5000 UNIT(S): 5000 INJECTION INTRAVENOUS; SUBCUTANEOUS at 21:48

## 2021-03-15 RX ADMIN — Medication 64 UNIT(S): at 21:47

## 2021-03-15 RX ADMIN — OXYCODONE HYDROCHLORIDE 80 MILLIGRAM(S): 5 TABLET ORAL at 10:54

## 2021-03-15 RX ADMIN — LACTULOSE 20 GRAM(S): 10 SOLUTION ORAL at 14:46

## 2021-03-15 RX ADMIN — OXYCODONE HYDROCHLORIDE 7.5 MILLIGRAM(S): 5 TABLET ORAL at 03:00

## 2021-03-15 RX ADMIN — NYSTATIN CREAM 1 APPLICATION(S): 100000 CREAM TOPICAL at 17:39

## 2021-03-15 RX ADMIN — Medication 2000 UNIT(S): at 12:24

## 2021-03-15 RX ADMIN — Medication 1 PUFF(S): at 21:49

## 2021-03-15 RX ADMIN — OXYCODONE HYDROCHLORIDE 80 MILLIGRAM(S): 5 TABLET ORAL at 21:47

## 2021-03-15 RX ADMIN — SODIUM CHLORIDE 3 MILLILITER(S): 9 INJECTION INTRAMUSCULAR; INTRAVENOUS; SUBCUTANEOUS at 21:49

## 2021-03-15 NOTE — PROVIDER CONTACT NOTE (OTHER) - SITUATION
same as above
patient complaining of feeling constipated. States "I hadn't had a bowel movement in 7 days, I feel like there is something there, I don't feel well"

## 2021-03-15 NOTE — PROGRESS NOTE ADULT - PROBLEM SELECTOR PLAN 4
BP controlled   Low salt diet.   Pt no longer on Lisinopril due to CKD.  s/p  Norvasc 5 mg po x  1  bp currently stable, will continue to monitor, pt on lasix

## 2021-03-15 NOTE — PROVIDER CONTACT NOTE (OTHER) - RECOMMENDATIONS
Provider notified, bedpan offered. Fleet enema and lactulose given as ordered with no relief. Further escalated to provider. Provider came to see patient
PA will order some pain meds

## 2021-03-15 NOTE — DISCHARGE NOTE NURSING/CASE MANAGEMENT/SOCIAL WORK - NSSCTYPOFSERV_GEN_ALL_CORE
RN to start the Day after Discharge  RN to start the Day after Discharge , Accepted for SOC 3/17/2021

## 2021-03-15 NOTE — PROGRESS NOTE ADULT - ASSESSMENT
65/F with PMH morbid obesity (ambulates with walker d/t spinal stenosis), DM on insulin, HTN, HLD, HFpEF, CKD3, Asthma, Pulm HTN (on NC 3l/min), old LLE DVT no longer on A/C x 5 years, Ovarian Ca s/p 2010 MEGAN, insomnia, Vit D Deficiency, chronic RLE wounds with Charcot arthropathy  - H/o chronic RLE wounds - recent admissions Nov 2020 and Jan 2021 - s/p PO abx  - Sent in by visiting Podiatrist for concern for worsening RLE plantar ulcer. Ulcer lanced and drained at home (no cx taken). H/o chills night before admission  - In ED, AVSS. Labs revealed WBC 10,570  - Eval by Podiatry - rt foot plantar wound to SubQ, no purulence, no drainage, no malodor, cellulitis to ankle. Excisional debridement performed down to SUbQ. Wound culture taken. MRI negative for OM. ID consulted for recs  =======  Diabetic foot wound infection  - Pt has DM (HbA1c 8.5) with underlying Charcot arthropathy - prone to recurrent infections  - Wound cx showing S. aureus, K. pneumoniae, P. mirablis, S. marascens. Past cx with MSSA and Finegoldia  - MRI revealed Charcot Arthropathy with no concern for OM  - sensis as above   can change to po keflex+ levaquin X 7 more days  adverse effects and poitential for progression to OM d/w pt  wound care per podiatry  Will tailor plan for ID issues  per course,results.Will defer to primary team on management of other issues.  Assessment, plan and recommendations as detailed above were discussed with the medical/primary  team.  ID will follow as needed,please call if any questions or issues.    Assessment, plan and recommendations as detailed above were discussed with the medical/primary  team.

## 2021-03-15 NOTE — PROVIDER CONTACT NOTE (OTHER) - ASSESSMENT
v/s stable axox4
Patient anxious and in discomfort. V/S taken see flow sheet. On 3L NC, complained of pain prn medication given with relief. Denies SOB, or chest pain. Appears to be in severe discomfort, restless at times.

## 2021-03-15 NOTE — PROGRESS NOTE ADULT - PROBLEM SELECTOR PLAN 10
VTE with heparin 5000U sub cut Q8*  Fall, Aspiration, safety, seizure precautions.   PT, SW, wound care eval. VTE with heparin 5000U sub cut Q8*  Fall, Aspiration, safety, seizure precautions.   Dispo: Pt wishes to be home w. home care services, refused [facility]. Discussed discharge medications and follow up appointments. Time spent: 36 mins

## 2021-03-15 NOTE — DISCHARGE NOTE NURSING/CASE MANAGEMENT/SOCIAL WORK - PATIENT PORTAL LINK FT
You can access the FollowMyHealth Patient Portal offered by [facility] by registering at the following website: [email or web address]. By joining [facility] FollowMyHealth portal, you will also be able to view your health information using other applications (apps) compatible with our system.

## 2021-03-15 NOTE — PROGRESS NOTE ADULT - PROBLEM SELECTOR PLAN 1
-MRI negative for OM, podaitry f/u apprecaited  -per ID switch to PO levaquin +keflex for 7d  -ID eval appreciated, recommend c/w continue cefepime 2gm!v q 12hrs   and vanco as per level  and On Dc can change to po keflex+ levaquin X 7 more days  -  B/L LE Duplex negative for DVT   -  Blood Cx NGTD   - Wound cx showing S. aureus, K. pneumoniae, P. mirablis, S. marascens. Past cx with MSSA and Finegoldia  - c/w wound care

## 2021-03-15 NOTE — PROVIDER CONTACT NOTE (OTHER) - BACKGROUND
Patient admitted for RLE cellulitis. PMH of DM2, CHF, CKD asthma, morbid obesity
back pain and leg pain

## 2021-03-15 NOTE — PROGRESS NOTE ADULT - SUBJECTIVE AND OBJECTIVE BOX
Patient is a 65y old  Female who presents with a chief complaint of Worsening R foot ulcer x 1 day (14 Mar 2021 14:50)    Being followed by ID for R foot cellulitis,ulcer    Interval history:feels well  some foot pain-baseline   No acute events      ROS:  No cough,SOB,CP  No N/V/D./abd pain  No other complaints      Antimicrobials:    cefepime   IVPB 2000 milliGRAM(s) IV Intermittent every 12 hours    Other medications reviewed    Vital Signs Last 24 Hrs  T(C): 36.6 (03-15-21 @ 06:31), Max: 36.9 (03-14-21 @ 22:06)  T(F): 97.8 (03-15-21 @ 06:31), Max: 98.4 (03-14-21 @ 22:06)  HR: 70 (03-15-21 @ 06:31) (70 - 72)  BP: 112/54 (03-15-21 @ 06:31) (112/54 - 126/55)  BP(mean): --  RR: 20 (03-15-21 @ 06:31) (18 - 20)  SpO2: 97% (03-15-21 @ 06:31) (97% - 99%)    Physical Exam:        HEENT PERRLA EOMI    No oral exudate or erythema    Chest Good AE,CTA    CVS RRR S1 S2 WNl No murmur or rub or gallop    Abd soft BS normal No tenderness no masses    IV site no erythema tenderness or discharge  R foot plantar ulcer no erythema no tenderness no discharge     bilateral stasis     CNS AAO X 3 no focal    Lab Data:                          11.6   9.48  )-----------( 252      ( 15 Mar 2021 06:33 )             38.4       03-15    140  |  101  |  36<H>  ----------------------------<  157<H>  4.6   |  29  |  1.71<H>    Ca    9.6      15 Mar 2021 06:33  Phos  3.4     03-15  Mg     2.1     03-15          Culture - Blood (collected 11 Mar 2021 23:04)  Source: .Blood Blood-Venous  Preliminary Report (13 Mar 2021 01:02):    No growth to date.    Culture - Abscess with Gram Stain (collected 11 Mar 2021 18:36)  Source: .Abscess foot  Preliminary Report (12 Mar 2021 18:28):    Few Staphylococcus aureus    Rare Proteus mirabilis    Rare Serratia marcescens    Rare Klebsiella pneumoniae  Organism: Staphylococcus aureus  Proteus mirabilis  Serratia marcescens  Klebsiella pneumoniae (13 Mar 2021 17:35)  Organism: Klebsiella pneumoniae (13 Mar 2021 17:35)      -  Amikacin: S <=16      -  Amoxicillin/Clavulanic Acid: S <=8/4      -  Ampicillin: R >16 These ampicillin results predict results for amoxicillin      -  Ampicillin/Sulbactam: S <=4/2 Enterobacter, Citrobacter, and Serratia may develop resistance during prolonged therapy (3-4 days)      -  Aztreonam: S <=4      -  Cefazolin: S <=2 Enterobacter, Citrobacter, and Serratia may develop resistance during prolonged therapy (3-4 days)      -  Cefepime: S <=2      -  Cefoxitin: S <=8      -  Ceftriaxone: S <=1 Enterobacter, Citrobacter, and Serratia may develop resistance during prolonged therapy      -  Ciprofloxacin: S <=0.25      -  Ertapenem: S <=0.5      -  Gentamicin: S <=2      -  Imipenem: S <=1      -  Levofloxacin: S <=0.5      -  Meropenem: S <=1      -  Piperacillin/Tazobactam: S <=8      -  Tobramycin: S <=2      -  Trimethoprim/Sulfamethoxazole: S <=0.5/9.5      Method Type: DARREL  Organism: Serratia marcescens (13 Mar 2021 17:35)      -  Amikacin: S <=16      -  Amoxicillin/Clavulanic Acid: R >16/8      -  Ampicillin: R >16 These ampicillin results predict results for amoxicillin      -  Ampicillin/Sulbactam: R 16/8 Enterobacter, Citrobacter, and Serratia may develop resistance during prolonged therapy (3-4 days)      -  Aztreonam: S <=4      -  Cefazolin: R >16 Enterobacter, Citrobacter, and Serratia may develop resistance during prolonged therapy (3-4 days)      -  Cefepime: S <=2      -  Cefoxitin: R <=8      -  Ceftriaxone: S <=1 Enterobacter, Citrobacter, and Serratia may develop resistance during prolonged therapy      -  Ciprofloxacin: S <=0.25      -  Ertapenem: S <=0.5      -  Gentamicin: S <=2      -  Levofloxacin: S <=0.5      -  Meropenem: S <=1      -  Piperacillin/Tazobactam: S <=8      -  Tobramycin: S <=2      -  Trimethoprim/Sulfamethoxazole: S <=0.5/9.5      Method Type: DARREL  Organism: Proteus mirabilis (13 Mar 2021 17:34)      -  Amikacin: S <=16      -  Amoxicillin/Clavulanic Acid: S <=8/4      -  Ampicillin: S <=8 These ampicillin results predict results for amoxicillin      -  Ampicillin/Sulbactam: S <=4/2 Enterobacter, Citrobacter, and Serratia may develop resistance during prolonged therapy (3-4 days)      -  Aztreonam: S <=4      -  Cefazolin: S <=2 Enterobacter, Citrobacter, and Serratia may develop resistance during prolonged therapy (3-4 days)      -  Cefepime: S <=2      -  Cefoxitin: S <=8      -  Ceftriaxone: S <=1 Enterobacter, Citrobacter, and Serratia may develop resistance during prolonged therapy      -  Ciprofloxacin: S <=0.25      -  Ertapenem: S <=0.5      -  Gentamicin: S <=2      -  Levofloxacin: S <=0.5      -  Meropenem: S <=1      -  Piperacillin/Tazobactam: S <=8      -  Tobramycin: S <=2      -  Trimethoprim/Sulfamethoxazole: S <=0.5/9.5      Method Type: DARREL  Organism: Staphylococcus aureus (13 Mar 2021 17:34)      -  Ampicillin/Sulbactam: S <=8/4      -  Cefazolin: S <=4      -  Clindamycin: S <=0.25      -  Erythromycin: S <=0.25      -  Gentamicin: S <=1 Should not be used as monotherapy      -  Oxacillin: S <=0.25      -  Penicillin: R 4      -  RIF- Rifampin: S <=1 Should not be used as monotherapy      -  Tetra/Doxy: S <=1      -  Trimethoprim/Sulfamethoxazole: S <=0.5/9.5      -  Vancomycin: S 1      Method Type: DARREL            Vancomycin Level, Random: 10.8 ug/mL (03-15-21 @ 06:33)  Vancomycin Level, Random: 9.4 ug/mL (03-14-21 @ 07:31)        < from: MR Foot w/wo IV Cont, Right (03.12.21 @ 16:12) >  IMPRESSION:  1. Charcot changes of the midfoot and hindfoot are again seen.  2. Plantar cutaneous wounds appear to be present along the midfoot without underlying osteomyelitis or abscess.  3. Marrow edema of the fourth and fifth metatarsal heads is presumably reactive or stress related in the absence of nearby soft tissue wounds.      < end of copied text >

## 2021-03-15 NOTE — PROGRESS NOTE ADULT - PROBLEM SELECTOR PLAN 2
Likely Opoid induced.  Last BM 3/10 consider small amount.  TSH WNL   on Miralax po QHS., will change to BID for now   Senna 2 tabs QHS added to medication regimen.

## 2021-03-15 NOTE — DISCHARGE NOTE NURSING/CASE MANAGEMENT/SOCIAL WORK - NSSCCARECORD_GEN_ALL_CORE
Home Care Agency/[facility]

## 2021-03-15 NOTE — CHART NOTE - NSCHARTNOTEFT_GEN_A_CORE
ID consulted. Will f/u recs.
Notified by patient that she feels like she hurt her left knee during transfer from stretcher to MRI table.   She states she is not sure if she twisted it or banged it but it was very painful at the time.  Pain is diffuse, difficult to pinpoint. She states her pain is subsiding as she his her knee elevated at the moment.  On exam, her active ROM is limited due to body habitus and chronic pain (which is at baseline per patient).  Her passive ROM is intact and non-painful.  Will continue with her PRN oxycodone for pain and keep leg elevated.  If pain does not subside or worsens, will consider Left knee xray for further evaluation.  Case and plan discussed with Dr. Roberson.
dc cancelled, pt unable to move bowels, manually disimpacted, s/p top H2O enema, lactulose, BM+ - still feels rectal pain, dc on hold until -pt has BM, states 5-7 days no BM, will sign out to PM shift provider to monitor     Lyric Smith ANP-C [id number]

## 2021-03-15 NOTE — PROGRESS NOTE ADULT - SUBJECTIVE AND OBJECTIVE BOX
Patient is a 65y old  Female who presents with a chief complaint of Worsening R foot ulcer x 1 day (15 Mar 2021 13:00)      SUBJECTIVE / OVERNIGHT EVENTS: No events overnight. pt reports improvement in RLE erythema and pain. Denies fever, chills, N/V.     MEDICATIONS  (STANDING):  aspirin enteric coated 81 milliGRAM(s) Oral daily  atorvastatin 80 milliGRAM(s) Oral at bedtime  budesonide 160 MICROgram(s)/formoterol 4.5 MICROgram(s) Inhaler 2 Puff(s) Inhalation two times a day  cefepime   IVPB 2000 milliGRAM(s) IV Intermittent every 12 hours  cholecalciferol 2000 Unit(s) Oral daily  clonazePAM  Tablet 0.5 milliGRAM(s) Oral at bedtime  dextrose 40% Gel 15 Gram(s) Oral once  dextrose 5%. 1000 milliLiter(s) (50 mL/Hr) IV Continuous <Continuous>  dextrose 5%. 1000 milliLiter(s) (100 mL/Hr) IV Continuous <Continuous>  dextrose 50% Injectable 25 Gram(s) IV Push once  dextrose 50% Injectable 12.5 Gram(s) IV Push once  dextrose 50% Injectable 25 Gram(s) IV Push once  furosemide    Tablet 40 milliGRAM(s) Oral daily  glucagon  Injectable 1 milliGRAM(s) IntraMuscular once  heparin   Injectable 5000 Unit(s) SubCutaneous every 8 hours  insulin detemir injectable (LEVEMIR) 64 Unit(s) SubCutaneous two times a day  insulin lispro (ADMELOG) corrective regimen sliding scale   SubCutaneous three times a day before meals  insulin lispro (ADMELOG) corrective regimen sliding scale   SubCutaneous at bedtime  insulin lispro Injectable (ADMELOG) 12 Unit(s) SubCutaneous three times a day before meals  ipratropium 17 MICROgram(s) HFA Inhaler 1 Puff(s) Inhalation every 6 hours  nystatin Powder 1 Application(s) Topical two times a day  oxyCODONE  ER Tablet 80 milliGRAM(s) Oral every 12 hours  polyethylene glycol 3350 17 Gram(s) Oral two times a day  senna 2 Tablet(s) Oral at bedtime  sodium chloride 0.9% lock flush 3 milliLiter(s) IV Push every 8 hours    MEDICATIONS  (PRN):  acetaminophen   Tablet .. 650 milliGRAM(s) Oral every 6 hours PRN Temp greater or equal to 38C (100.4F), Mild Pain (1 - 3)  ALBUTerol    90 MICROgram(s) HFA Inhaler 2 Puff(s) Inhalation every 6 hours PRN Shortness of Breath and/or Wheezing  ondansetron    Tablet 4 milliGRAM(s) Oral every 6 hours PRN Nausea  oxyCODONE    IR 7.5 milliGRAM(s) Oral every 8 hours PRN moderateand sever pain        CAPILLARY BLOOD GLUCOSE      POCT Blood Glucose.: 165 mg/dL (15 Mar 2021 12:03)  POCT Blood Glucose.: 183 mg/dL (15 Mar 2021 09:50)  POCT Blood Glucose.: 144 mg/dL (15 Mar 2021 08:23)  POCT Blood Glucose.: 174 mg/dL (14 Mar 2021 22:12)  POCT Blood Glucose.: 170 mg/dL (14 Mar 2021 17:33)    I&O's Summary    Vital Signs Last 24 Hrs  T(C): 36.6 (15 Mar 2021 06:31), Max: 36.9 (14 Mar 2021 22:06)  T(F): 97.8 (15 Mar 2021 06:31), Max: 98.4 (14 Mar 2021 22:06)  HR: 70 (15 Mar 2021 06:31) (70 - 72)  BP: 112/54 (15 Mar 2021 06:31) (112/54 - 126/55)  BP(mean): --  RR: 20 (15 Mar 2021 06:31) (18 - 20)  SpO2: 97% (15 Mar 2021 06:31) (97% - 99%)    PHYSICAL EXAM:  GENERAL: NAD, morbidly obese ,lying in bed with O2 via  NC   HEAD:  Atraumatic, Normocephalic  EYES: EOMI, PERRLA, conjunctiva and sclera clear  CHEST/LUNG: Clear to auscultation bilaterally; No wheeze  HEART: Regular rate and rhythm;   ABDOMEN: Soft, Nontender, Nondistended; Bowel sounds present  EXTREMITIES:  2+ Peripheral Pulses, , B/L LE swelling, R LE > L LE, erythema present B/L   PSYCH: AAOx3  NEUROLOGY: non-focal  SKIN: Rt foot with dressing     LABS:                        11.6   9.48  )-----------( 252      ( 15 Mar 2021 06:33 )             38.4     03-15    140  |  101  |  36<H>  ----------------------------<  157<H>  4.6   |  29  |  1.71<H>    Ca    9.6      15 Mar 2021 06:33  Phos  3.4     03-15  Mg     2.1     03-15

## 2021-03-15 NOTE — PROVIDER CONTACT NOTE (OTHER) - ACTION/TREATMENT ORDERED:
Water enema ordered, fleet enema ordered, lactulose ordered. Came to see patient, "manually" fecal impacted patient. Continue to monitor for BM, and discharge cancelled

## 2021-03-16 ENCOUNTER — FORM ENCOUNTER (OUTPATIENT)
Age: 66
End: 2021-03-16

## 2021-03-16 ENCOUNTER — APPOINTMENT (OUTPATIENT)
Dept: INTERNAL MEDICINE | Facility: CLINIC | Age: 66
End: 2021-03-16

## 2021-03-16 VITALS
DIASTOLIC BLOOD PRESSURE: 44 MMHG | HEART RATE: 79 BPM | RESPIRATION RATE: 17 BRPM | TEMPERATURE: 98 F | OXYGEN SATURATION: 100 % | SYSTOLIC BLOOD PRESSURE: 126 MMHG

## 2021-03-16 LAB
CULTURE RESULTS: SIGNIFICANT CHANGE UP
GLUCOSE BLDC GLUCOMTR-MCNC: 118 MG/DL — HIGH (ref 70–99)
GLUCOSE BLDC GLUCOMTR-MCNC: 130 MG/DL — HIGH (ref 70–99)
ORGANISM # SPEC MICROSCOPIC CNT: SIGNIFICANT CHANGE UP
SPECIMEN SOURCE: SIGNIFICANT CHANGE UP

## 2021-03-16 PROCEDURE — 99232 SBSQ HOSP IP/OBS MODERATE 35: CPT

## 2021-03-16 PROCEDURE — 99239 HOSP IP/OBS DSCHRG MGMT >30: CPT

## 2021-03-16 RX ORDER — JNJ-78436735 50000000000 [PFU]/.5ML
0.5 SUSPENSION INTRAMUSCULAR ONCE
Refills: 0 | Status: COMPLETED | OUTPATIENT
Start: 2021-03-16 | End: 2021-03-16

## 2021-03-16 RX ADMIN — POLYETHYLENE GLYCOL 3350 17 GRAM(S): 17 POWDER, FOR SOLUTION ORAL at 05:44

## 2021-03-16 RX ADMIN — NYSTATIN CREAM 1 APPLICATION(S): 100000 CREAM TOPICAL at 05:43

## 2021-03-16 RX ADMIN — Medication 2000 UNIT(S): at 12:34

## 2021-03-16 RX ADMIN — HEPARIN SODIUM 5000 UNIT(S): 5000 INJECTION INTRAVENOUS; SUBCUTANEOUS at 05:43

## 2021-03-16 RX ADMIN — HEPARIN SODIUM 5000 UNIT(S): 5000 INJECTION INTRAVENOUS; SUBCUTANEOUS at 12:34

## 2021-03-16 RX ADMIN — Medication 1 PUFF(S): at 05:43

## 2021-03-16 RX ADMIN — Medication 40 MILLIGRAM(S): at 05:43

## 2021-03-16 RX ADMIN — OXYCODONE HYDROCHLORIDE 7.5 MILLIGRAM(S): 5 TABLET ORAL at 14:08

## 2021-03-16 RX ADMIN — NYSTATIN CREAM 1 APPLICATION(S): 100000 CREAM TOPICAL at 17:17

## 2021-03-16 RX ADMIN — Medication 500 MILLIGRAM(S): at 05:43

## 2021-03-16 RX ADMIN — Medication 12 UNIT(S): at 12:35

## 2021-03-16 RX ADMIN — Medication 64 UNIT(S): at 08:45

## 2021-03-16 RX ADMIN — Medication 500 MILLIGRAM(S): at 12:35

## 2021-03-16 RX ADMIN — OXYCODONE HYDROCHLORIDE 80 MILLIGRAM(S): 5 TABLET ORAL at 11:27

## 2021-03-16 RX ADMIN — OXYCODONE HYDROCHLORIDE 7.5 MILLIGRAM(S): 5 TABLET ORAL at 01:45

## 2021-03-16 RX ADMIN — JNJ-78436735 0.5 MILLILITER(S): 50000000000 SUSPENSION INTRAMUSCULAR at 13:03

## 2021-03-16 RX ADMIN — OXYCODONE HYDROCHLORIDE 7.5 MILLIGRAM(S): 5 TABLET ORAL at 14:15

## 2021-03-16 RX ADMIN — OXYCODONE HYDROCHLORIDE 7.5 MILLIGRAM(S): 5 TABLET ORAL at 00:45

## 2021-03-16 RX ADMIN — Medication 12 UNIT(S): at 08:45

## 2021-03-16 RX ADMIN — Medication 81 MILLIGRAM(S): at 12:34

## 2021-03-16 NOTE — PROGRESS NOTE ADULT - PROBLEM SELECTOR PLAN 10
VTE with heparin 5000U sub cut Q8*  Fall, Aspiration, safety, seizure precautions.   Dispo: Pt wishes to be home w. home care services, refused [facility]. Discussed discharge medications and follow up appointments. Time spent: 36 mins

## 2021-03-16 NOTE — PROGRESS NOTE ADULT - PROBLEM SELECTOR PROBLEM 9
Moderate asthma, unspecified whether complicated, unspecified whether persistent

## 2021-03-16 NOTE — PROGRESS NOTE ADULT - PROBLEM/PLAN-6
Message   Recorded as Task   Date: 01/11/2017 07:50 AM, Created By: Fabrice Guzman   Task Name: Miscellaneous   Assigned To: Rubia Richard   Regarding Patient: Elan Degree, Status: Active   Comment:    James Noriega - 11 Jan 2017 7:50 AM     TASK CREATED  Isabell Oliver,  Please send a copy of this letter to the patient  Thanks,   Leann   mailed Dr Thalia Samuels office [place] from 01/10/2017 to patient today      Active Problems    1  Candidiasis (112 9) (B37 9)   2  Chronic deep vein thrombosis (DVT) of popliteal vein of right lower extremity (453 51)   (I82 531)   3  Dysmenorrhea (625 3) (N94 6)   4  Encounter for gynecological examination with abnormal finding (V72 31) (Z01 411)   5  Encounter for routine gynecological examination (V72 31) (Z01 419)   6  Encounter for screening mammogram for malignant neoplasm of breast (V76 12)   (Z12 31)   7  Screening for human papillomavirus (HPV) (V73 81) (Z11 51)   8  Urinary frequency (788 41) (R35 0)   9  Urinary tract infection (599 0) (N39 0)    Current Meds   1  Aspirin 81 MG TABS; Therapy: (Recorded:25Oct2016) to Recorded   2  Magnesium Oxide 500 MG Oral Tablet; Take 1 tablet daily Recorded   3  Naproxen 500 MG Oral Tablet; TAKE 1 TABLET EVERY 12 HOURS AS NEEDED; Therapy: [id number] to (Evaluate:07Jun2016)  Requested for: 08Apr2016; Last   Rx:08Apr2016 Ordered    Allergies    1   No Known Drug Allergies    Signatures   Electronically signed by : Masood Membreno, ; Jan 19 2017  2:06PM EST                       (Author)
DISPLAY PLAN FREE TEXT

## 2021-03-16 NOTE — PROGRESS NOTE ADULT - PROBLEM/PLAN-5
Detail Level: Detailed
General Sunscreen Counseling: I recommended a broad spectrum sunscreen with a SPF of 30 or higher.  I explained that SPF 30 sunscreens block approximately 97 percent of the sun's harmful rays.  Sunscreens should be applied at least 15 minutes prior to expected sun exposure and then every 2 hours after that as long as sun exposure continues. If swimming or exercising sunscreen should be reapplied every 45 minutes to an hour after getting wet or sweating.  One ounce, or the equivalent of a shot glass full of sunscreen, is adequate to protect the skin not covered by a bathing suit. I also recommended a lip balm with a sunscreen as well. Sun protective clothing can be used in lieu of sunscreen but must be worn the entire time you are exposed to the sun's rays.
DISPLAY PLAN FREE TEXT

## 2021-03-16 NOTE — PROGRESS NOTE ADULT - PROBLEM SELECTOR PLAN 8
Low salt diet.  Continue Lasix 40 mg po daily.

## 2021-03-16 NOTE — PROGRESS NOTE ADULT - SUBJECTIVE AND OBJECTIVE BOX
Patient is a 65y old  Female who presents with a chief complaint of Worsening R foot ulcer x 1 day (16 Mar 2021 08:38)      SUBJECTIVE / OVERNIGHT EVENTS: Pt had multiple BMs after enema and lactulose. Pt has no other complaints.    MEDICATIONS  (STANDING):  aspirin enteric coated 81 milliGRAM(s) Oral daily  atorvastatin 80 milliGRAM(s) Oral at bedtime  budesonide 160 MICROgram(s)/formoterol 4.5 MICROgram(s) Inhaler 2 Puff(s) Inhalation two times a day  cephalexin 500 milliGRAM(s) Oral every 8 hours  cholecalciferol 2000 Unit(s) Oral daily  clonazePAM  Tablet 0.5 milliGRAM(s) Oral at bedtime  dextrose 40% Gel 15 Gram(s) Oral once  dextrose 5%. 1000 milliLiter(s) (50 mL/Hr) IV Continuous <Continuous>  dextrose 5%. 1000 milliLiter(s) (100 mL/Hr) IV Continuous <Continuous>  dextrose 50% Injectable 25 Gram(s) IV Push once  dextrose 50% Injectable 12.5 Gram(s) IV Push once  dextrose 50% Injectable 25 Gram(s) IV Push once  furosemide    Tablet 40 milliGRAM(s) Oral daily  glucagon  Injectable 1 milliGRAM(s) IntraMuscular once  heparin   Injectable 5000 Unit(s) SubCutaneous every 8 hours  insulin detemir injectable (LEVEMIR) 64 Unit(s) SubCutaneous two times a day  insulin lispro (ADMELOG) corrective regimen sliding scale   SubCutaneous three times a day before meals  insulin lispro (ADMELOG) corrective regimen sliding scale   SubCutaneous at bedtime  insulin lispro Injectable (ADMELOG) 12 Unit(s) SubCutaneous three times a day before meals  ipratropium 17 MICROgram(s) HFA Inhaler 1 Puff(s) Inhalation every 6 hours  levoFLOXacin  Tablet 500 milliGRAM(s) Oral every 24 hours  nystatin Powder 1 Application(s) Topical two times a day  oxyCODONE  ER Tablet 80 milliGRAM(s) Oral every 12 hours  polyethylene glycol 3350 17 Gram(s) Oral two times a day  senna 2 Tablet(s) Oral at bedtime  sodium chloride 0.9% lock flush 3 milliLiter(s) IV Push every 8 hours    MEDICATIONS  (PRN):  acetaminophen   Tablet .. 650 milliGRAM(s) Oral every 6 hours PRN Temp greater or equal to 38C (100.4F), Mild Pain (1 - 3)  ALBUTerol    90 MICROgram(s) HFA Inhaler 2 Puff(s) Inhalation every 6 hours PRN Shortness of Breath and/or Wheezing  ondansetron    Tablet 4 milliGRAM(s) Oral every 6 hours PRN Nausea  oxyCODONE    IR 7.5 milliGRAM(s) Oral every 8 hours PRN moderateand sever pain        CAPILLARY BLOOD GLUCOSE      POCT Blood Glucose.: 118 mg/dL (16 Mar 2021 12:00)  POCT Blood Glucose.: 130 mg/dL (16 Mar 2021 08:31)  POCT Blood Glucose.: 189 mg/dL (15 Mar 2021 21:46)  POCT Blood Glucose.: 229 mg/dL (15 Mar 2021 17:05)      Vital Signs Last 24 Hrs  T(C): 36.6 (16 Mar 2021 14:26), Max: 37.2 (15 Mar 2021 21:47)  T(F): 97.9 (16 Mar 2021 14:26), Max: 98.9 (15 Mar 2021 21:47)  HR: 79 (16 Mar 2021 14:26) (70 - 86)  BP: 126/44 (16 Mar 2021 14:26) (113/56 - 150/52)  BP(mean): --  RR: 17 (16 Mar 2021 14:26) (17 - 20)  SpO2: 100% (16 Mar 2021 14:26) (100% - 100%)      PHYSICAL EXAM:  GENERAL: NAD, morbidly obese ,lying in bed with O2 via  NC   HEAD:  Atraumatic, Normocephalic  EYES: EOMI, PERRLA, conjunctiva and sclera clear  CHEST/LUNG: Clear to auscultation bilaterally; No wheeze  HEART: Regular rate and rhythm;   ABDOMEN: Soft, Nontender, Nondistended; Bowel sounds present  EXTREMITIES:  2+ Peripheral Pulses, , B/L LE swelling, R LE > L LE, erythema present B/L   PSYCH: AAOx3  NEUROLOGY: non-focal  SKIN: Rt foot with dressing     LABS:                        11.6   9.48  )-----------( 252      ( 15 Mar 2021 06:33 )             38.4     03-15    140  |  101  |  36<H>  ----------------------------<  157<H>  4.6   |  29  |  1.71<H>    Ca    9.6      15 Mar 2021 06:33  Phos  3.4     03-15  Mg     2.1     03-15

## 2021-03-16 NOTE — PROGRESS NOTE ADULT - PROBLEM SELECTOR PLAN 9
Currently stable at this time.  Continue inhalers.

## 2021-03-16 NOTE — PROGRESS NOTE ADULT - PROBLEM SELECTOR PROBLEM 8
Congestive heart failure, unspecified HF chronicity, unspecified heart failure type

## 2021-03-16 NOTE — PROGRESS NOTE ADULT - PROBLEM SELECTOR PROBLEM 5
Spinal stenosis of lumbar region, unspecified whether neurogenic claudication present
3

## 2021-03-16 NOTE — PROGRESS NOTE ADULT - SUBJECTIVE AND OBJECTIVE BOX
Patient is a 65y old  Female who presents with a chief complaint of Worsening R foot ulcer x 1 day (15 Mar 2021 14:05)       INTERVAL HPI/OVERNIGHT EVENTS:  Patient seen and evaluated at bedside.  Pt is resting comfortable in NAD. Denies N/V/F/C.  Pain rated at X/10    Allergies    adhesives (Rash)  Bactrim (Flushing)  latex (Rash)  wool- rash, itch (Other)    Intolerances        Vital Signs Last 24 Hrs  T(C): 37.1 (16 Mar 2021 05:46), Max: 37.2 (15 Mar 2021 21:47)  T(F): 98.7 (16 Mar 2021 05:46), Max: 98.9 (15 Mar 2021 21:47)  HR: 81 (16 Mar 2021 05:46) (70 - 86)  BP: 113/56 (16 Mar 2021 05:46) (113/56 - 150/52)  BP(mean): --  RR: 17 (16 Mar 2021 05:46) (17 - 20)  SpO2: 100% (16 Mar 2021 05:46) (100% - 100%)    LABS:                        11.6   9.48  )-----------( 252      ( 15 Mar 2021 06:33 )             38.4     03-15    140  |  101  |  36<H>  ----------------------------<  157<H>  4.6   |  29  |  1.71<H>    Ca    9.6      15 Mar 2021 06:33  Phos  3.4     03-15  Mg     2.1     03-15          CAPILLARY BLOOD GLUCOSE      POCT Blood Glucose.: 130 mg/dL (16 Mar 2021 08:31)  POCT Blood Glucose.: 189 mg/dL (15 Mar 2021 21:46)  POCT Blood Glucose.: 229 mg/dL (15 Mar 2021 17:05)  POCT Blood Glucose.: 165 mg/dL (15 Mar 2021 12:03)  POCT Blood Glucose.: 183 mg/dL (15 Mar 2021 09:50)      Lower Extremity Physical Exam:      Vascular: DP/PT 0/4 B/L d/y edema, CFT <3 seconds right, > 5 seconds left, Temperature gradient warm to warm B/L  Neuro: Epicritic sensation diminished to the level of ankle B/L  Musculoskeletal/Ortho: unremarkable  Skin:  Wound #1: right foot plantar wound to SubQ, no purulence, no drainage, no malodor, no fluctuance, 3+ pitting edema, cellulitis to ankle, etiology diabetic neuropathy    RADIOLOGY & ADDITIONAL TESTS:

## 2021-03-16 NOTE — PROGRESS NOTE ADULT - PROVIDER SPECIALTY LIST ADULT
Infectious Disease
Podiatry
Hospitalist
Podiatry
Infectious Disease
Podiatry
Hospitalist

## 2021-03-16 NOTE — PROGRESS NOTE ADULT - PROBLEM SELECTOR PROBLEM 1
Cellulitis of right foot

## 2021-03-16 NOTE — PROGRESS NOTE ADULT - PROBLEM SELECTOR PROBLEM 6
Vitamin D deficiency

## 2021-03-16 NOTE — PROGRESS NOTE ADULT - PROBLEM SELECTOR PROBLEM 7
Stage 3a chronic kidney disease

## 2021-03-16 NOTE — PROGRESS NOTE ADULT - REASON FOR ADMISSION
Worsening R foot ulcer x 1 day

## 2021-03-16 NOTE — PROGRESS NOTE ADULT - PROBLEM SELECTOR PLAN 2
Likely Opoid induced. No BM for 7 days therefore discharge was cancelled 3/15. although had multiple BMs after tap water enema and lactulose  TSH WNL   on Miralax po QHS., will change to BID for now   Senna 2 tabs QHS added to medication regimen.

## 2021-03-16 NOTE — PROGRESS NOTE ADULT - PROBLEM SELECTOR PROBLEM 3
Type 2 diabetes mellitus with stage 3 chronic kidney disease, with long-term current use of insulin, unspecified whether stage 3a or 3b CKD

## 2021-03-16 NOTE — PROGRESS NOTE ADULT - ASSESSMENT
65 F RF wound to SuB cellulitis to ankle  - pt seen and evaluated  - afebrile, vitals stable, WBC 9.48  -RFXR: Soft tissue swelling. Plantar hindfoot region shallow soft tissue ulceration again noted. No tracking gas collections beyond the ulcer margins and no gross radiographic evidence for underlying osteomyelitis.  - right foot plantar wound to SubQ, no purulence, no drainage, no malodor, no fluctuance, 3+ pitting edema, cellulitis to ankle resolved, etiology diabetic neuropathy  - MRI revealed no evidence of Osteomyelitis  -ID recs keflex and levaquin 1 week  - stable for discharge from podiatry standpoint pending ID recs  - f/u information & instructions can be found in the f/u section of the provider d/c note   - discussed w/ attending

## 2021-03-17 ENCOUNTER — NON-APPOINTMENT (OUTPATIENT)
Age: 66
End: 2021-03-17

## 2021-03-17 LAB
CULTURE RESULTS: SIGNIFICANT CHANGE UP
SPECIMEN SOURCE: SIGNIFICANT CHANGE UP

## 2021-03-18 PROBLEM — I50.9 HEART FAILURE, UNSPECIFIED: Chronic | Status: ACTIVE | Noted: 2021-03-11

## 2021-03-18 PROBLEM — C55 MALIGNANT NEOPLASM OF UTERUS, PART UNSPECIFIED: Chronic | Status: ACTIVE | Noted: 2021-03-11

## 2021-03-18 PROBLEM — Z99.81 DEPENDENCE ON SUPPLEMENTAL OXYGEN: Chronic | Status: ACTIVE | Noted: 2021-03-12

## 2021-03-18 PROBLEM — R26.9 UNSPECIFIED ABNORMALITIES OF GAIT AND MOBILITY: Chronic | Status: ACTIVE | Noted: 2021-03-12

## 2021-03-18 PROBLEM — N18.9 CHRONIC KIDNEY DISEASE, UNSPECIFIED: Chronic | Status: ACTIVE | Noted: 2018-06-28

## 2021-03-18 PROBLEM — J45.909 UNSPECIFIED ASTHMA, UNCOMPLICATED: Chronic | Status: ACTIVE | Noted: 2021-03-11

## 2021-03-23 ENCOUNTER — APPOINTMENT (OUTPATIENT)
Dept: INTERNAL MEDICINE | Facility: CLINIC | Age: 66
End: 2021-03-23
Payer: MEDICARE

## 2021-03-23 PROCEDURE — 99443: CPT

## 2021-04-02 DIAGNOSIS — N39.0 URINARY TRACT INFECTION, SITE NOT SPECIFIED: ICD-10-CM

## 2021-04-23 ENCOUNTER — RX RENEWAL (OUTPATIENT)
Age: 66
End: 2021-04-23

## 2021-04-28 NOTE — PATIENT PROFILE ADULT - FALL HARM RISK CONCLUSION
[FreeTextEntry1] : 1. No additional cardiac testing at this time.\par 2. Continue lisinopril for hypertension.  Recommend considering a change to valsartan but she is unwilling at this time.\par 3. Monitor BP at home, keep a log and bring to f/u.\par 4. Patient encouraged to work on diet to lose weight.\par 5. Patient is encouraged to exercise at least 30 minutes a day everyday of the week.\par 6. Repeat blood work.\par 7. Follow up here in four months. Fall with Harm Risk

## 2021-05-10 LAB
25(OH)D3 SERPL-MCNC: 26.1 NG/ML
ALBUMIN SERPL ELPH-MCNC: 3.9 G/DL
ALP BLD-CCNC: 91 U/L
ALT SERPL-CCNC: 16 U/L
ANION GAP SERPL CALC-SCNC: 14 MMOL/L
AST SERPL-CCNC: 22 U/L
BASOPHILS # BLD AUTO: 0.03 K/UL
BASOPHILS NFR BLD AUTO: 0.5 %
BILIRUB SERPL-MCNC: 0.3 MG/DL
BUN SERPL-MCNC: 38 MG/DL
CALCIUM SERPL-MCNC: 9 MG/DL
CHLORIDE SERPL-SCNC: 103 MMOL/L
CHOLEST SERPL-MCNC: 125 MG/DL
CO2 SERPL-SCNC: 25 MMOL/L
CREAT SERPL-MCNC: 1.92 MG/DL
EOSINOPHIL # BLD AUTO: 0.47 K/UL
EOSINOPHIL NFR BLD AUTO: 7.3 %
ESTIMATED AVERAGE GLUCOSE: 88 MG/DL
GLUCOSE SERPL-MCNC: 68 MG/DL
HBA1C MFR BLD HPLC: 4.7 %
HCT VFR BLD CALC: 40.2 %
HDLC SERPL-MCNC: 62 MG/DL
HGB BLD-MCNC: 12.8 G/DL
IMM GRANULOCYTES NFR BLD AUTO: 0.2 %
LDLC SERPL CALC-MCNC: 42 MG/DL
LYMPHOCYTES # BLD AUTO: 1.22 K/UL
LYMPHOCYTES NFR BLD AUTO: 18.9 %
MAN DIFF?: NORMAL
MCHC RBC-ENTMCNC: 29.3 PG
MCHC RBC-ENTMCNC: 31.8 GM/DL
MCV RBC AUTO: 92 FL
MONOCYTES # BLD AUTO: 0.33 K/UL
MONOCYTES NFR BLD AUTO: 5.1 %
NEUTROPHILS # BLD AUTO: 4.4 K/UL
NEUTROPHILS NFR BLD AUTO: 68 %
NONHDLC SERPL-MCNC: 62 MG/DL
PLATELET # BLD AUTO: 167 K/UL
POTASSIUM SERPL-SCNC: 4.5 MMOL/L
PROT SERPL-MCNC: 6.2 G/DL
RBC # BLD: 4.37 M/UL
RBC # FLD: 15.9 %
SODIUM SERPL-SCNC: 143 MMOL/L
T4 FREE SERPL-MCNC: 0.9 NG/DL
TRIGL SERPL-MCNC: 102 MG/DL
TSH SERPL-ACNC: 4.96 UIU/ML
WBC # FLD AUTO: 6.46 K/UL

## 2021-06-09 ENCOUNTER — APPOINTMENT (OUTPATIENT)
Dept: INTERNAL MEDICINE | Facility: CLINIC | Age: 66
End: 2021-06-09
Payer: MEDICARE

## 2021-06-09 PROCEDURE — 99442: CPT

## 2021-07-19 ENCOUNTER — NON-APPOINTMENT (OUTPATIENT)
Age: 66
End: 2021-07-19

## 2021-08-13 ENCOUNTER — NON-APPOINTMENT (OUTPATIENT)
Age: 66
End: 2021-08-13

## 2021-08-25 NOTE — DISCHARGE NOTE ADULT - HOME CARE AGENCY
Your home care services has been referred to [facility] ([phone]).  Please call them to inquire about time of Registered Nurse visit. Isotretinoin Counseling: Patient should get monthly blood tests, not donate blood, not drive at night if vision affected, not share medication, and not undergo elective surgery for 6 months after tx completed. Side effects reviewed, pt to contact office should one occur.

## 2021-09-14 NOTE — CONSULT NOTE ADULT - REASON FOR ADMISSION
acute hypoxemic respiratory failure, acute decompensated heart failure
acute hypoxemic respiratory failure, acute decompensated heart failure
on admission

## 2021-09-16 NOTE — DISCHARGE NOTE NURSING/CASE MANAGEMENT/SOCIAL WORK - NSDCPEPT PROEDMA_GEN_ALL_CORE
Yes
unable to care for self

## 2021-10-27 ENCOUNTER — APPOINTMENT (OUTPATIENT)
Dept: INTERNAL MEDICINE | Facility: CLINIC | Age: 66
End: 2021-10-27

## 2021-10-31 NOTE — ED PROVIDER NOTE - OBJECTIVE STATEMENT
66 yo f pmh morbid obesity, chronic pain syndrome from spinal stenosis on opioids, venous stasis with chronic leg edema, HTN, DM complicated by neuropathy and Charcot foot, CKD, pw rle swelling/erythema. reports chronic wound to sole of r foot, susceptible to infections. noticed erythema/swelling to r foot approx two weeks ago, received augmentin w/ relief. yesterday sx recurred however more severe. reports mild pain to area. denies paresthesias, cp, sob, cough, congestion, tyler, trauma, f/c. Will restart patient's cardene gtt. Norvasc STAT dose ordered.

## 2021-11-05 NOTE — PATIENT PROFILE ADULT - NSPROMUTPARTICIPCAREFT_GEN_A_NUR
mild postprandial hypoglycemia yesterday-recommend decrease in Admelog to 2 units ac   Never smoker n/a

## 2021-11-24 ENCOUNTER — NON-APPOINTMENT (OUTPATIENT)
Age: 66
End: 2021-11-24

## 2021-11-26 ENCOUNTER — RX RENEWAL (OUTPATIENT)
Age: 66
End: 2021-11-26

## 2021-11-29 ENCOUNTER — NON-APPOINTMENT (OUTPATIENT)
Age: 66
End: 2021-11-29

## 2021-11-29 ENCOUNTER — APPOINTMENT (OUTPATIENT)
Dept: INTERNAL MEDICINE | Facility: CLINIC | Age: 66
End: 2021-11-29

## 2021-12-01 NOTE — PROVIDER CONTACT NOTE (MEDICATION) - ACTION/TREATMENT ORDERED:
11/24 MRI brain results reviewed by Dr. Stephenson.    Imaging report states:  \"IMPRESSION: Small nonspecific white matter lesions may be the result of  early microvascular white matter disease, but can also be seen in the  setting of chronic migraine headaches. No acute intracranial process or  findings of metastasis. Moderate ethmoid and mild maxillary sinus disease.\"    Per Dr. Stephenson: Refer to neurology d/t findings from possible chronic migraines.  Start doxycycline 100mg BID x10 days and refer to ENT d/t ethmoid & maxillary sinus disease.  Follow up as scheduled.  Last office visit: 11/9/2021  Next office visit: 5/23/2022    3:39 PM: Patient notified of recent lab results and recommendations per Dr. Stephenson.  Patient has verbalized understanding and denies any further questions or concerns.  Patient will await call from neurology for scheduling.  Patient reports she has chronic sinus issues and has seen ENT in the past and declines needing a new referral at this time.  Discussed new prescription, directions for use and expected side effects.  New prescription sent to preferred pharmacy.   Hold premeal and give lantus, encourage PO intake

## 2021-12-08 ENCOUNTER — RX RENEWAL (OUTPATIENT)
Age: 66
End: 2021-12-08

## 2021-12-14 ENCOUNTER — NON-APPOINTMENT (OUTPATIENT)
Age: 66
End: 2021-12-14

## 2021-12-15 ENCOUNTER — APPOINTMENT (OUTPATIENT)
Dept: INTERNAL MEDICINE | Facility: CLINIC | Age: 66
End: 2021-12-15

## 2021-12-18 ENCOUNTER — EMERGENCY (EMERGENCY)
Facility: HOSPITAL | Age: 66
LOS: 1 days | Discharge: HOME | End: 2021-12-18
Attending: EMERGENCY MEDICINE
Payer: MEDICARE

## 2021-12-18 VITALS
TEMPERATURE: 98 F | WEIGHT: 293 LBS | SYSTOLIC BLOOD PRESSURE: 130 MMHG | OXYGEN SATURATION: 94 % | HEART RATE: 95 BPM | RESPIRATION RATE: 24 BRPM | HEIGHT: 63 IN | DIASTOLIC BLOOD PRESSURE: 74 MMHG

## 2021-12-18 DIAGNOSIS — Z90.49 ACQUIRED ABSENCE OF OTHER SPECIFIED PARTS OF DIGESTIVE TRACT: Chronic | ICD-10-CM

## 2021-12-18 DIAGNOSIS — Z90.710 ACQUIRED ABSENCE OF BOTH CERVIX AND UTERUS: Chronic | ICD-10-CM

## 2021-12-18 DIAGNOSIS — Z98.890 OTHER SPECIFIED POSTPROCEDURAL STATES: Chronic | ICD-10-CM

## 2021-12-18 PROCEDURE — 99284 EMERGENCY DEPT VISIT MOD MDM: CPT

## 2021-12-18 PROCEDURE — 73562 X-RAY EXAM OF KNEE 3: CPT | Mod: 26,LT

## 2021-12-18 RX ORDER — CIPROFLOXACIN HYDROCHLORIDE 500 MG/1
500 TABLET, FILM COATED ORAL TWICE DAILY
Qty: 14 | Refills: 0 | Status: DISCONTINUED | COMMUNITY
Start: 2021-04-02 | End: 2021-12-18

## 2021-12-18 RX ORDER — OXYCODONE HYDROCHLORIDE 5 MG/1
80 TABLET ORAL ONCE
Refills: 0 | Status: DISCONTINUED | OUTPATIENT
Start: 2021-12-18 | End: 2021-12-18

## 2021-12-18 RX ORDER — SYRGE-NDL,INS 0.3 ML HALF MARK 31GX15/64"
31G X 15/64" SYRINGE, EMPTY DISPOSABLE MISCELLANEOUS
Qty: 300 | Refills: 0 | Status: DISCONTINUED | COMMUNITY
Start: 2020-06-10 | End: 2021-12-18

## 2021-12-18 RX ORDER — KETOROLAC TROMETHAMINE 30 MG/ML
60 SYRINGE (ML) INJECTION ONCE
Refills: 0 | Status: DISCONTINUED | OUTPATIENT
Start: 2021-12-18 | End: 2021-12-18

## 2021-12-18 RX ORDER — ACETAMINOPHEN 500 MG
975 TABLET ORAL ONCE
Refills: 0 | Status: COMPLETED | OUTPATIENT
Start: 2021-12-18 | End: 2021-12-18

## 2021-12-18 RX ORDER — AMOXICILLIN AND CLAVULANATE POTASSIUM 875; 125 MG/1; MG/1
875-125 TABLET, COATED ORAL
Qty: 20 | Refills: 0 | Status: DISCONTINUED | COMMUNITY
Start: 2021-04-29 | End: 2021-12-18

## 2021-12-18 RX ADMIN — Medication 975 MILLIGRAM(S): at 21:40

## 2021-12-18 RX ADMIN — Medication 60 MILLIGRAM(S): at 21:40

## 2021-12-18 RX ADMIN — OXYCODONE HYDROCHLORIDE 80 MILLIGRAM(S): 5 TABLET ORAL at 22:53

## 2021-12-18 RX ADMIN — Medication 975 MILLIGRAM(S): at 22:53

## 2021-12-18 RX ADMIN — Medication 60 MILLIGRAM(S): at 22:53

## 2021-12-18 NOTE — ED PROVIDER NOTE - CLINICAL SUMMARY MEDICAL DECISION MAKING FREE TEXT BOX
65yo F w/pmh severe morbid obesity, HTN, HLD, DM, CHF presents for fall, now c/o L knee pain. Xrays pending. 65yo F w/pmh severe morbid obesity, HTN, HLD, DM, CHF presents for fall, now c/o L knee pain. Xrays unremarkable. Home oxycodone dose ordered for her chronic pain.

## 2021-12-18 NOTE — ED PROVIDER NOTE - ATTENDING CONTRIBUTION TO CARE
67 yo F with hx of morbid obesity presents to ED c/o l knee pain s/p fall landing on her knees and was unable to get up without assistance.  Pt has chronic chest pain with low voltage on 12 lead EKG with no acute change.  On exam awake and alert in NAD, PERRL, throat clear mm moist, Neck supple, Cor Reg, Lungs clear b/l, Abd soft, obese, NT, Ext L knee with full PROM, no gross deformity, Neuro non-focal, Rx pain meds, check xray and re-eval

## 2021-12-18 NOTE — ED PROVIDER NOTE - NSFOLLOWUPINSTRUCTIONS_ED_ALL_ED_FT
Your xrays were negative for any acute findings.    1. Follow up with your doctor as needed.  2. Return to the ED for any new or worsening symptoms, such as inability to move at your baseline, severe worsening pain.

## 2021-12-18 NOTE — ED ADULT TRIAGE NOTE - CHIEF COMPLAINT QUOTE
pt presents to ED s/p fall onto knees and inability to get up. pt with chronic chest pain, unchanged today. EKG obtained. wears 4-5L NC at baseline.

## 2021-12-18 NOTE — ED PROVIDER NOTE - NSICDXPASTMEDICALHX_GEN_ALL_CORE_FT
PAST MEDICAL HISTORY:  Asthma     Cataract     Cervical Stenosis of Spine     CKD (chronic kidney disease) ~ III    Congestive heart failure ~ HFpEF    Deep Vein Thrombosis (DVT) Left leg, 2004, treated and resolved    Diabetes Mellitus Type II     Dyslipidemia     Endometrial Hyperplasia     Gait difficulty ~ u ses walker    HTN (Hypertension)     Insomnia     Morbid Obesity     On home oxygen therapy     Spinal Stenosis, Lumbar     Uterine cancer     Vitamin D deficiency

## 2021-12-18 NOTE — ED PROVIDER NOTE - OBJECTIVE STATEMENT
65yo F w/pmh severe morbid obesity, HTN, HLD, DM, CHF presents for fall. Reports she was turning to get back into bed and fell. Denies lightheadedness, syncope/LOC, head trauma. Now c/o L knee pain. Reports ROM still intact. Denies weakness, paresthesias. Lives at home w/ two sons. Ambulatory at baseline. 65yo F w/pmh severe morbid obesity, HTN, HLD, DM, CHF presents for fall. Reports she was turning to get back into bed and fell. Denies lightheadedness, syncope/LOC, head trauma. Now c/o L knee pain. Reports ROM still intact. Denies weakness, paresthesias. Lives at home w/ two sons. Ambulatory minimally at baseline, uses a walker to shuffle a few steps between bed and chair.

## 2021-12-18 NOTE — ED PROVIDER NOTE - PATIENT PORTAL LINK FT
You can access the FollowMyHealth Patient Portal offered by [facility] by registering at the following website: [email or web address]. By joining [facility]’s FollowMyHealth portal, you will also be able to view your health information using other applications (apps) compatible with our system.

## 2021-12-18 NOTE — ED ADULT NURSE REASSESSMENT NOTE - NS ED NURSE REASSESS COMMENT FT1
upon attempt to ambulate pt unable to bear weight. will medicate with home meds and attempt again. pt aware of plan of care.

## 2021-12-18 NOTE — ED PROVIDER NOTE - PRINCIPAL DIAGNOSIS
I have personally evaluated and examined the patient. The Attending was available to me as a supervising provider if needed. Knee pain

## 2021-12-18 NOTE — ED PROVIDER NOTE - PHYSICAL EXAMINATION
General: well appearing, NAD  Head:  NC, AT  Eyes: EOMI, PERRLA  Nose: midline, no bleeding/drainage  Mouth: no lacerations, no loose teeth  Cardiac: RRR, no m/r/g  Respiratory: CTABL, equal chest wall expansions, no conversational dyspnea  Abdomen: soft, ND, NT  Pelvis: stable  MSK/Vascular: L knee mildly ttp, full ROM, distal pulses intact, soft compartments, warm extremities  Neuro: AAOx3, GCS 15, following commands, answering questions appropriately  Psych: calm, cooperative, normal affect

## 2021-12-18 NOTE — ED PROVIDER NOTE - PROGRESS NOTE DETAILS
Resident Jazmine Swann: discussed imaging results w/ pt, knee xrays negative for acute findings. Answered questions. Resident Jazmine Swann: pt reports exacerbation of her chronic pain due to missing her oxycodone home dose. Ordered her home oxycodone 80.

## 2021-12-18 NOTE — ED PROVIDER NOTE - NS ED ROS FT
HEENT: No HA, no vision changes, no facial trauma  Chest: no chest wall pain, no dyspnea  GI: No abdominal pain, no nausea, no vomiting  Neuro: No LOC, no paresthesias, no weakness  MSK: +msk pain, no swelling  Skin: No abrasions/lacerations, no ecchymosis  ROS otherwise negative except as noted in HPI

## 2021-12-18 NOTE — ED ADULT NURSE NOTE - OBJECTIVE STATEMENT
pt comes to ED via EMS from home with reports of mechanical fall today resulting in falling to her knees. c.o left knee pain. pt AOX3, reports is able to ambulate at home with walker. pt with no head injury, no other complaints. pt states she is on 5LPM 02 via nasal cannula. states she is also on 80mg oxy and needs her dose ATC.

## 2021-12-19 VITALS
DIASTOLIC BLOOD PRESSURE: 68 MMHG | TEMPERATURE: 98 F | OXYGEN SATURATION: 98 % | HEART RATE: 76 BPM | RESPIRATION RATE: 17 BRPM | SYSTOLIC BLOOD PRESSURE: 122 MMHG

## 2021-12-19 PROCEDURE — 73562 X-RAY EXAM OF KNEE 3: CPT

## 2021-12-19 PROCEDURE — 96372 THER/PROPH/DIAG INJ SC/IM: CPT

## 2021-12-19 PROCEDURE — 82962 GLUCOSE BLOOD TEST: CPT

## 2021-12-19 PROCEDURE — 99283 EMERGENCY DEPT VISIT LOW MDM: CPT | Mod: 25

## 2021-12-19 RX ORDER — OXYCODONE HYDROCHLORIDE 5 MG/1
80 TABLET ORAL ONCE
Refills: 0 | Status: DISCONTINUED | OUTPATIENT
Start: 2021-12-19 | End: 2021-12-19

## 2021-12-19 RX ADMIN — OXYCODONE HYDROCHLORIDE 80 MILLIGRAM(S): 5 TABLET ORAL at 08:30

## 2021-12-19 NOTE — ED ADULT NURSE REASSESSMENT NOTE - NS ED NURSE REASSESS COMMENT FT1
Patient rounded on at this time. Patient is sleeping patient with no signs or symptoms of distress at this time. Patient on O2 and stating at 95 percent.

## 2021-12-19 NOTE — ED ADULT NURSE REASSESSMENT NOTE - NS ED NURSE REASSESS COMMENT FT1
Patient rounded on at this time. PAtient is sleeping patient with no signs or symptoms of distress at this time

## 2021-12-19 NOTE — ED ADULT NURSE REASSESSMENT NOTE - NS ED NURSE REASSESS COMMENT FT1
Patient rounded on at this time. Patient is sleeping patient with no signs or symptoms of distress at this time. Patient with no signs and symptoms of distress at this time VSS during the night with no complaints. Patient is alert and oriented.

## 2021-12-19 NOTE — ED ADULT NURSE REASSESSMENT NOTE - NS ED NURSE REASSESS COMMENT FT1
Patient awaiting transfer home with Ambulance. Patient given hygiene/perineal care with pain meds as ordered. Will continue to assess.

## 2021-12-20 ENCOUNTER — NON-APPOINTMENT (OUTPATIENT)
Age: 66
End: 2021-12-20

## 2022-01-15 ENCOUNTER — RX RENEWAL (OUTPATIENT)
Age: 67
End: 2022-01-15

## 2022-01-24 ENCOUNTER — EMERGENCY (EMERGENCY)
Facility: HOSPITAL | Age: 67
LOS: 1 days | Discharge: HOME | End: 2022-01-24
Attending: EMERGENCY MEDICINE
Payer: MEDICARE

## 2022-01-24 ENCOUNTER — NON-APPOINTMENT (OUTPATIENT)
Age: 67
End: 2022-01-24

## 2022-01-24 VITALS
OXYGEN SATURATION: 99 % | DIASTOLIC BLOOD PRESSURE: 67 MMHG | HEIGHT: 63 IN | TEMPERATURE: 97 F | SYSTOLIC BLOOD PRESSURE: 139 MMHG | HEART RATE: 59 BPM | RESPIRATION RATE: 18 BRPM

## 2022-01-24 DIAGNOSIS — Z90.710 ACQUIRED ABSENCE OF BOTH CERVIX AND UTERUS: Chronic | ICD-10-CM

## 2022-01-24 DIAGNOSIS — Z90.49 ACQUIRED ABSENCE OF OTHER SPECIFIED PARTS OF DIGESTIVE TRACT: Chronic | ICD-10-CM

## 2022-01-24 DIAGNOSIS — Z98.890 OTHER SPECIFIED POSTPROCEDURAL STATES: Chronic | ICD-10-CM

## 2022-01-24 LAB
ALBUMIN SERPL ELPH-MCNC: 3.8 G/DL — SIGNIFICANT CHANGE UP (ref 3.3–5.2)
ALP SERPL-CCNC: 109 U/L — SIGNIFICANT CHANGE UP (ref 40–120)
ALT FLD-CCNC: 24 U/L — SIGNIFICANT CHANGE UP
ANION GAP SERPL CALC-SCNC: 11 MMOL/L — SIGNIFICANT CHANGE UP (ref 5–17)
APTT BLD: 29.1 SEC — SIGNIFICANT CHANGE UP (ref 27.5–35.5)
AST SERPL-CCNC: 41 U/L — HIGH
BASE EXCESS BLDA CALC-SCNC: 4.9 MMOL/L — HIGH (ref -2–3)
BASOPHILS # BLD AUTO: 0.08 K/UL — SIGNIFICANT CHANGE UP (ref 0–0.2)
BASOPHILS NFR BLD AUTO: 0.7 % — SIGNIFICANT CHANGE UP (ref 0–2)
BILIRUB SERPL-MCNC: 0.7 MG/DL — SIGNIFICANT CHANGE UP (ref 0.4–2)
BLD GP AB SCN SERPL QL: SIGNIFICANT CHANGE UP
BLOOD GAS COMMENTS ARTERIAL: SIGNIFICANT CHANGE UP
BUN SERPL-MCNC: 37.3 MG/DL — HIGH (ref 8–20)
CALCIUM SERPL-MCNC: 9.3 MG/DL — SIGNIFICANT CHANGE UP (ref 8.6–10.2)
CHLORIDE SERPL-SCNC: 102 MMOL/L — SIGNIFICANT CHANGE UP (ref 98–107)
CO2 SERPL-SCNC: 28 MMOL/L — SIGNIFICANT CHANGE UP (ref 22–29)
CREAT SERPL-MCNC: 1.8 MG/DL — HIGH (ref 0.5–1.3)
EOSINOPHIL # BLD AUTO: 0.18 K/UL — SIGNIFICANT CHANGE UP (ref 0–0.5)
EOSINOPHIL NFR BLD AUTO: 1.5 % — SIGNIFICANT CHANGE UP (ref 0–6)
GAS PNL BLDA: SIGNIFICANT CHANGE UP
GLUCOSE BLDC GLUCOMTR-MCNC: 142 MG/DL — HIGH (ref 70–99)
GLUCOSE SERPL-MCNC: 146 MG/DL — HIGH (ref 70–99)
HCO3 BLDA-SCNC: 30 MMOL/L — HIGH (ref 21–28)
HCT VFR BLD CALC: 39 % — SIGNIFICANT CHANGE UP (ref 34.5–45)
HGB BLD-MCNC: 11.8 G/DL — SIGNIFICANT CHANGE UP (ref 11.5–15.5)
HOROWITZ INDEX BLDA+IHG-RTO: SIGNIFICANT CHANGE UP
IMM GRANULOCYTES NFR BLD AUTO: 0.6 % — SIGNIFICANT CHANGE UP (ref 0–1.5)
INR BLD: 1.15 RATIO — SIGNIFICANT CHANGE UP (ref 0.88–1.16)
LYMPHOCYTES # BLD AUTO: 1.28 K/UL — SIGNIFICANT CHANGE UP (ref 1–3.3)
LYMPHOCYTES # BLD AUTO: 10.8 % — LOW (ref 13–44)
MCHC RBC-ENTMCNC: 28.3 PG — SIGNIFICANT CHANGE UP (ref 27–34)
MCHC RBC-ENTMCNC: 30.3 GM/DL — LOW (ref 32–36)
MCV RBC AUTO: 93.5 FL — SIGNIFICANT CHANGE UP (ref 80–100)
MONOCYTES # BLD AUTO: 0.97 K/UL — HIGH (ref 0–0.9)
MONOCYTES NFR BLD AUTO: 8.2 % — SIGNIFICANT CHANGE UP (ref 2–14)
NEUTROPHILS # BLD AUTO: 9.23 K/UL — HIGH (ref 1.8–7.4)
NEUTROPHILS NFR BLD AUTO: 78.2 % — HIGH (ref 43–77)
NT-PROBNP SERPL-SCNC: 1329 PG/ML — HIGH (ref 0–300)
PCO2 BLDA: 48 MMHG — HIGH (ref 32–35)
PH BLDA: 7.4 — SIGNIFICANT CHANGE UP (ref 7.35–7.45)
PLATELET # BLD AUTO: 211 K/UL — SIGNIFICANT CHANGE UP (ref 150–400)
PO2 BLDA: 129 MMHG — HIGH (ref 83–108)
POTASSIUM SERPL-MCNC: 4.4 MMOL/L — SIGNIFICANT CHANGE UP (ref 3.5–5.3)
POTASSIUM SERPL-SCNC: 4.4 MMOL/L — SIGNIFICANT CHANGE UP (ref 3.5–5.3)
PROT SERPL-MCNC: 6.6 G/DL — SIGNIFICANT CHANGE UP (ref 6.6–8.7)
PROTHROM AB SERPL-ACNC: 13.2 SEC — SIGNIFICANT CHANGE UP (ref 10.6–13.6)
RBC # BLD: 4.17 M/UL — SIGNIFICANT CHANGE UP (ref 3.8–5.2)
RBC # FLD: 14.5 % — SIGNIFICANT CHANGE UP (ref 10.3–14.5)
SAO2 % BLDA: 99.1 % — HIGH (ref 94–98)
SARS-COV-2 RNA SPEC QL NAA+PROBE: SIGNIFICANT CHANGE UP
SODIUM SERPL-SCNC: 141 MMOL/L — SIGNIFICANT CHANGE UP (ref 135–145)
TROPONIN T SERPL-MCNC: 0.03 NG/ML — SIGNIFICANT CHANGE UP (ref 0–0.06)
WBC # BLD: 11.81 K/UL — HIGH (ref 3.8–10.5)
WBC # FLD AUTO: 11.81 K/UL — HIGH (ref 3.8–10.5)

## 2022-01-24 PROCEDURE — 73562 X-RAY EXAM OF KNEE 3: CPT | Mod: 26,RT

## 2022-01-24 PROCEDURE — 93926 LOWER EXTREMITY STUDY: CPT | Mod: 26,LT

## 2022-01-24 PROCEDURE — 99220: CPT

## 2022-01-24 PROCEDURE — 73080 X-RAY EXAM OF ELBOW: CPT | Mod: 26,RT

## 2022-01-24 PROCEDURE — 93970 EXTREMITY STUDY: CPT | Mod: 26

## 2022-01-24 PROCEDURE — 73630 X-RAY EXAM OF FOOT: CPT | Mod: 26,50

## 2022-01-24 PROCEDURE — 93010 ELECTROCARDIOGRAM REPORT: CPT

## 2022-01-24 PROCEDURE — 71045 X-RAY EXAM CHEST 1 VIEW: CPT | Mod: 26

## 2022-01-24 RX ORDER — ATORVASTATIN CALCIUM 80 MG/1
80 TABLET, FILM COATED ORAL AT BEDTIME
Refills: 0 | Status: DISCONTINUED | OUTPATIENT
Start: 2022-01-24 | End: 2022-01-28

## 2022-01-24 RX ORDER — SODIUM CHLORIDE 9 MG/ML
1000 INJECTION, SOLUTION INTRAVENOUS
Refills: 0 | Status: DISCONTINUED | OUTPATIENT
Start: 2022-01-24 | End: 2022-01-28

## 2022-01-24 RX ORDER — BUDESONIDE AND FORMOTEROL FUMARATE DIHYDRATE 160; 4.5 UG/1; UG/1
2 AEROSOL RESPIRATORY (INHALATION)
Refills: 0 | Status: DISCONTINUED | OUTPATIENT
Start: 2022-01-24 | End: 2022-01-28

## 2022-01-24 RX ORDER — OXYCODONE HYDROCHLORIDE 5 MG/1
80 TABLET ORAL EVERY 12 HOURS
Refills: 0 | Status: COMPLETED | OUTPATIENT
Start: 2022-01-24 | End: 2022-01-31

## 2022-01-24 RX ORDER — GLUCAGON INJECTION, SOLUTION 0.5 MG/.1ML
1 INJECTION, SOLUTION SUBCUTANEOUS ONCE
Refills: 0 | Status: DISCONTINUED | OUTPATIENT
Start: 2022-01-24 | End: 2022-01-28

## 2022-01-24 RX ORDER — SENNA PLUS 8.6 MG/1
2 TABLET ORAL AT BEDTIME
Refills: 0 | Status: DISCONTINUED | OUTPATIENT
Start: 2022-01-24 | End: 2022-01-28

## 2022-01-24 RX ORDER — DEXTROSE 50 % IN WATER 50 %
12.5 SYRINGE (ML) INTRAVENOUS ONCE
Refills: 0 | Status: DISCONTINUED | OUTPATIENT
Start: 2022-01-24 | End: 2022-01-28

## 2022-01-24 RX ORDER — INSULIN DETEMIR 100/ML (3)
64 INSULIN PEN (ML) SUBCUTANEOUS
Refills: 0 | Status: DISCONTINUED | OUTPATIENT
Start: 2022-01-24 | End: 2022-01-28

## 2022-01-24 RX ORDER — DEXTROSE 50 % IN WATER 50 %
25 SYRINGE (ML) INTRAVENOUS ONCE
Refills: 0 | Status: DISCONTINUED | OUTPATIENT
Start: 2022-01-24 | End: 2022-01-28

## 2022-01-24 RX ORDER — INSULIN LISPRO 100/ML
12 VIAL (ML) SUBCUTANEOUS
Refills: 0 | Status: DISCONTINUED | OUTPATIENT
Start: 2022-01-24 | End: 2022-01-28

## 2022-01-24 RX ORDER — TETANUS TOXOID, REDUCED DIPHTHERIA TOXOID AND ACELLULAR PERTUSSIS VACCINE, ADSORBED 5; 2.5; 8; 8; 2.5 [IU]/.5ML; [IU]/.5ML; UG/.5ML; UG/.5ML; UG/.5ML
0.5 SUSPENSION INTRAMUSCULAR ONCE
Refills: 0 | Status: COMPLETED | OUTPATIENT
Start: 2022-01-24 | End: 2022-01-24

## 2022-01-24 RX ORDER — ASPIRIN/CALCIUM CARB/MAGNESIUM 324 MG
81 TABLET ORAL DAILY
Refills: 0 | Status: DISCONTINUED | OUTPATIENT
Start: 2022-01-24 | End: 2022-01-28

## 2022-01-24 RX ORDER — METOCLOPRAMIDE HCL 10 MG
10 TABLET ORAL ONCE
Refills: 0 | Status: COMPLETED | OUTPATIENT
Start: 2022-01-24 | End: 2022-01-24

## 2022-01-24 RX ORDER — DEXTROSE 50 % IN WATER 50 %
15 SYRINGE (ML) INTRAVENOUS ONCE
Refills: 0 | Status: DISCONTINUED | OUTPATIENT
Start: 2022-01-24 | End: 2022-01-28

## 2022-01-24 RX ORDER — FUROSEMIDE 40 MG
40 TABLET ORAL DAILY
Refills: 0 | Status: DISCONTINUED | OUTPATIENT
Start: 2022-01-24 | End: 2022-01-28

## 2022-01-24 RX ORDER — SODIUM CHLORIDE 9 MG/ML
1000 INJECTION INTRAMUSCULAR; INTRAVENOUS; SUBCUTANEOUS ONCE
Refills: 0 | Status: COMPLETED | OUTPATIENT
Start: 2022-01-24 | End: 2022-01-24

## 2022-01-24 RX ORDER — KETOROLAC TROMETHAMINE 30 MG/ML
30 SYRINGE (ML) INJECTION ONCE
Refills: 0 | Status: DISCONTINUED | OUTPATIENT
Start: 2022-01-24 | End: 2022-01-24

## 2022-01-24 RX ADMIN — Medication 81 MILLIGRAM(S): at 21:09

## 2022-01-24 RX ADMIN — OXYCODONE HYDROCHLORIDE 80 MILLIGRAM(S): 5 TABLET ORAL at 22:10

## 2022-01-24 RX ADMIN — TETANUS TOXOID, REDUCED DIPHTHERIA TOXOID AND ACELLULAR PERTUSSIS VACCINE, ADSORBED 0.5 MILLILITER(S): 5; 2.5; 8; 8; 2.5 SUSPENSION INTRAMUSCULAR at 08:46

## 2022-01-24 RX ADMIN — ATORVASTATIN CALCIUM 80 MILLIGRAM(S): 80 TABLET, FILM COATED ORAL at 21:09

## 2022-01-24 RX ADMIN — SENNA PLUS 2 TABLET(S): 8.6 TABLET ORAL at 21:10

## 2022-01-24 RX ADMIN — OXYCODONE HYDROCHLORIDE 80 MILLIGRAM(S): 5 TABLET ORAL at 21:10

## 2022-01-24 RX ADMIN — OXYCODONE HYDROCHLORIDE 80 MILLIGRAM(S): 5 TABLET ORAL at 09:51

## 2022-01-24 RX ADMIN — Medication 64 UNIT(S): at 21:26

## 2022-01-24 RX ADMIN — Medication 30 MILLIGRAM(S): at 20:07

## 2022-01-24 RX ADMIN — Medication 10 MILLIGRAM(S): at 13:07

## 2022-01-24 RX ADMIN — OXYCODONE HYDROCHLORIDE 80 MILLIGRAM(S): 5 TABLET ORAL at 08:49

## 2022-01-24 RX ADMIN — SODIUM CHLORIDE 1000 MILLILITER(S): 9 INJECTION INTRAMUSCULAR; INTRAVENOUS; SUBCUTANEOUS at 21:09

## 2022-01-24 RX ADMIN — Medication 30 MILLIGRAM(S): at 13:07

## 2022-01-24 RX ADMIN — Medication 40 MILLIGRAM(S): at 21:10

## 2022-01-24 NOTE — ED PROVIDER NOTE - OBJECTIVE STATEMENT
65yo F w/pmh morbid obesity, DM, CHF, CKD, HTN, HLD, chronic back pain presents for fall. Reports she was trying to move from bed to her electric chair and slid out of the bed onto the floor. Denies head trauma or LOC. Reports she felt room-spinning dizziness assoc/w her fall, had one similar episode 2 weeks ago, no other hx of falls, no dizziness at this time. Now c/o R elbow and R knee pain. Not on AC.

## 2022-01-24 NOTE — ED PROVIDER NOTE - PROGRESS NOTE DETAILS
Resident Jazmine Swann: discussed labs, knee xrs w/ pt, additional xrs pending. Ordered home oxycontin ER 80mg. Resident Jazmine Swann: discussed negative knee XRs, pt's inability to ambulate, plan for PT eval and possible rehab placement. Pt amenable to plan.

## 2022-01-24 NOTE — ED CDU PROVIDER INITIAL DAY NOTE - ATTENDING CONTRIBUTION TO CARE
I, Komal Zavaleta, participated in the care of this patient with the PA. I discussed the history and physical exam findings as well as lab results and plan of care with the PA. I agree with PA's history, physical and assessment.     65F morbidly obese F, DVT (not on AC), CKD, HTN, HCpEF on 3L NC presents s/p a fall onto her knees, required a Toan lift to get up, cannot take care of herself at home, placed in obs for PT to see and placement.    AP - Morbidly obese, on 3L NC, RRR, lungs CTA anteriorly, abd soft, large panus without obvious infection, chronically edematous bilateral LE edema with chronic, non-healing wounds to bilateral toes, poor ROM of the legs (chronic per patient).    Patient will be put in obs for PT evaluation and placement.

## 2022-01-24 NOTE — ED ADULT NURSE REASSESSMENT NOTE - NS ED NURSE REASSESS COMMENT FT1
Pt inc of large amount of urine, 4 people at bedside to change sheets, pt turned and repositioned. pt myra ti well, pure wick placed again for comfort.

## 2022-01-24 NOTE — ED PROVIDER NOTE - CLINICAL SUMMARY MEDICAL DECISION MAKING FREE TEXT BOX
65yo F w/pmh morbid obesity, DM, CHF, CKD, HTN, HLD, chronic back pain presents for fall, now c/o R elbow and knee pain. Xrays, labs pending. Plan for PT eval.

## 2022-01-24 NOTE — ED CDU PROVIDER INITIAL DAY NOTE - OBJECTIVE STATEMENT
65F, morbidly obese, ambulates with a walker due to spinal stenosis, history of DM2 on insulin x 26 years, Asthma with multiple hospitalizations, never intubated, old LLE DVT no longer on A/C x 5 years, Ovarian Ca s/p 2010 MEGAN, CKD3, Hyperlipidemia, HTN, HFpEF@70% on home O2 3L, Insomnia, Vit D Deficiency 65F, morbidly obese, ambulates with a walker due to spinal stenosis, history of DM2 on insulin x 26 years, Asthma with multiple hospitalizations, never intubated, old LLE DVT no longer on A/C x 5 years, Ovarian Ca s/p 2010 MEGAN, CKD3, Hyperlipidemia, HTN, HFpEF@70% on home O2 3L, Insomnia, Vit D Deficiency.  Reports she was trying to move from bed to her electric chair and slid out of the bed onto the floor. Denies head trauma or LOC. Reports she felt room-spinning dizziness assoc/w her fall, had one similar episode 2 weeks ago, no other hx of falls, no dizziness at this time. Now c/o R elbow and R knee pain. Not on AC.

## 2022-01-24 NOTE — ED CDU PROVIDER INITIAL DAY NOTE - MEDICAL DECISION MAKING DETAILS
morbid obesity s/p fall: PT eval, SW/CM for possible placement, check UA/UC, CXR, head CT, re-start home medication

## 2022-01-24 NOTE — ED PROVIDER NOTE - PHYSICAL EXAMINATION
General: well appearing, NAD  Head:  NC, AT  Eyes: EOMI, PERRLA  Nose: midline, no bleeding/drainage  Mouth: no lacerations, no loose teeth  Neck/Back: No cervical ttp or step-off  Cardiac: RRR, no m/r/g  Respiratory: CTABL, equal chest wall expansions, no conversational dyspnea  Abdomen: soft, ND, NT  Pelvis: stable  MSK/Vascular: RUE elbow ttp distal to the olecranon, full ROM, 5/5 strength; RLE knee diffusely ttp, ROM limited 2/2 obesity and positioning and pain; small abrasions of toes b/l; distal pulses intact, soft compartments, warm extremities  Neuro: AAOx3, GCS 15, following commands, answering questions appropriately  Psych: calm, cooperative, normal affect

## 2022-01-24 NOTE — ED ADULT NURSE REASSESSMENT NOTE - NS ED NURSE REASSESS COMMENT FT1
Report received from off going RN, care of pt assumed at 0730, pt received resting on hospital bed due to morbidly obesity, pt biba with complaints of dizziness while she was attempting to turn on bed and get to her electric wheel chair, she fell onto back, rolled over to her abdomen to attempt to get up but was unable to. now p/w right knee and elbow pain. denies head trauma. denies loc. denies n/v.  denies numbness/tingling. denies focal weakness. denies cp/sob/palp prior to fall. did reports some dizziness prior to fall.   L UE: from/nt @shoulder/elbow/wrist/hand   R UE: from/nt @shoulder/elbow/wrist/hand   L LE: from/nt @ hip/knee/ankle   R LE: from/nt @hip/ankle. ttp @ anterior knee, from @ knee.     R LE:  erythema over distal foot with abrasions, L LE:  cool and cyanotic over toes, blistering over toe 1 & 2, bilateral DP and TP palpable and dopplered

## 2022-01-24 NOTE — ED PROVIDER NOTE - ATTENDING CONTRIBUTION TO CARE
66yoF; with PMH signif for Morbid Obesity, DM, CHF, CKD, Chronic Back pain (on Oxycontin 80mg ER, uses walker to walk; now p/w dizziness while attempting to turn on bed, fell onto back, rolled over to her abdomen to attempt to get up. now p/w right knee and elbow pain. denies head trauma. denies loc. denies n/v.  denies numbness/tingling. denies focal weakness. denies cp/sob/palp prior to fall. did reports some dizziness prior to fall.  Gen: Alert, NAD  Head: NC, AT, PERRL, EOMI, normal lids/conjunctiva  Neck: +supple, no tenderness/meningismus/JVD, +Trachea midline  Pulm: Bilateral BS, normal resp effort, no wheeze/stridor/retractions  CV: RRR, no M/R/G, +dist pulses  Abd: soft, NT/ND, +BS, no hepatosplenomegaly  Mskel:    L UE: from/nt @shoulder/elbow/wrist/hand   R UE: from/nt @shoulder/elbow/wrist/hand   L LE: from/nt @ hip/knee/ankle   R LE: from/nt @hip/ankle. ttp @ anterior knee, from @ knee.     R LE:  erythema over distal foot with abrasions, L LE:  cool and cyanotic over toes, blistering over toe 1 & 2, bilateral DP and TP palpable and dopplered  BACK: nt midline c/t/l/s spine.   Neuro: AAOx3, no sensory/motor deficits, CN 2-12 intact  A/P: 66yoF p/w fall, unable to stand s/p fall  -labs, xray, pain control, adacel, re-eval.

## 2022-01-25 LAB
APPEARANCE UR: CLEAR — SIGNIFICANT CHANGE UP
BACTERIA # UR AUTO: ABNORMAL
BILIRUB UR-MCNC: NEGATIVE — SIGNIFICANT CHANGE UP
COLOR SPEC: YELLOW — SIGNIFICANT CHANGE UP
DIFF PNL FLD: NEGATIVE — SIGNIFICANT CHANGE UP
EPI CELLS # UR: SIGNIFICANT CHANGE UP
GLUCOSE BLDC GLUCOMTR-MCNC: 123 MG/DL — HIGH (ref 70–99)
GLUCOSE BLDC GLUCOMTR-MCNC: 143 MG/DL — HIGH (ref 70–99)
GLUCOSE BLDC GLUCOMTR-MCNC: 183 MG/DL — HIGH (ref 70–99)
GLUCOSE BLDC GLUCOMTR-MCNC: 78 MG/DL — SIGNIFICANT CHANGE UP (ref 70–99)
GLUCOSE UR QL: NEGATIVE MG/DL — SIGNIFICANT CHANGE UP
KETONES UR-MCNC: NEGATIVE — SIGNIFICANT CHANGE UP
LEUKOCYTE ESTERASE UR-ACNC: NEGATIVE — SIGNIFICANT CHANGE UP
NITRITE UR-MCNC: NEGATIVE — SIGNIFICANT CHANGE UP
PH UR: 6 — SIGNIFICANT CHANGE UP (ref 5–8)
PROT UR-MCNC: NEGATIVE — SIGNIFICANT CHANGE UP
RBC CASTS # UR COMP ASSIST: SIGNIFICANT CHANGE UP /HPF (ref 0–4)
SP GR SPEC: 1.01 — SIGNIFICANT CHANGE UP (ref 1.01–1.02)
UROBILINOGEN FLD QL: NEGATIVE MG/DL — SIGNIFICANT CHANGE UP
WBC UR QL: SIGNIFICANT CHANGE UP /HPF (ref 0–5)

## 2022-01-25 PROCEDURE — 99226: CPT

## 2022-01-25 RX ADMIN — Medication 40 MILLIGRAM(S): at 12:32

## 2022-01-25 RX ADMIN — Medication 64 UNIT(S): at 21:13

## 2022-01-25 RX ADMIN — OXYCODONE HYDROCHLORIDE 80 MILLIGRAM(S): 5 TABLET ORAL at 08:50

## 2022-01-25 RX ADMIN — OXYCODONE HYDROCHLORIDE 80 MILLIGRAM(S): 5 TABLET ORAL at 09:45

## 2022-01-25 RX ADMIN — Medication 64 UNIT(S): at 08:43

## 2022-01-25 RX ADMIN — Medication 12 UNIT(S): at 17:06

## 2022-01-25 RX ADMIN — Medication 81 MILLIGRAM(S): at 12:32

## 2022-01-25 RX ADMIN — Medication 12 UNIT(S): at 12:31

## 2022-01-25 RX ADMIN — ATORVASTATIN CALCIUM 80 MILLIGRAM(S): 80 TABLET, FILM COATED ORAL at 21:13

## 2022-01-25 RX ADMIN — OXYCODONE HYDROCHLORIDE 80 MILLIGRAM(S): 5 TABLET ORAL at 21:12

## 2022-01-25 RX ADMIN — BUDESONIDE AND FORMOTEROL FUMARATE DIHYDRATE 2 PUFF(S): 160; 4.5 AEROSOL RESPIRATORY (INHALATION) at 08:43

## 2022-01-25 RX ADMIN — SENNA PLUS 2 TABLET(S): 8.6 TABLET ORAL at 21:13

## 2022-01-25 NOTE — PROVIDER CONTACT NOTE (OTHER) - ASSESSMENT
Pt is 67 y/o F s/p fall at home presented with decreased functional mobility, pt is a good candidate for skilled PT services to maximize function and safety with ADL's. PT will follow.

## 2022-01-25 NOTE — ED ADULT NURSE REASSESSMENT NOTE - INTERVENTIONS DEFINITIONS
[place] to call system/Call bell, personal items and telephone within reach/Physically safe environment: no spills, clutter or unnecessary equipment/Stretcher in lowest position, wheels locked, appropriate side rails in place/Monitor gait and stability/Monitor for mental status changes and reorient to person, place, and time/Reinforce activity limits and safety measures with patient and family

## 2022-01-25 NOTE — ED ADULT NURSE REASSESSMENT NOTE - NS ED NURSE REASSESS COMMENT FT1
Pt awake and alert x4, VSS afebrile. Pt c/o constipation -  enema given as ordered with 1 large bowel movement. Pt denies c/o pain at this time. Safety maintained. Will continue to monitor.

## 2022-01-25 NOTE — PROVIDER CONTACT NOTE (OTHER) - BACKGROUND
PT eval NOT completed pending x tray results to r/o fx, (refer to orders), PT will follow.
Pt received in the semi-Knight position, and left, no pain reported 0/10 before, during and after,  RN and pt in agreement for PT services, RN made aware of the Tx outcome.

## 2022-01-25 NOTE — PHYSICAL THERAPY INITIAL EVALUATION ADULT - PERTINENT HX OF CURRENT PROBLEM, REHAB EVAL
presented to ED due to fall when she was  trying to move from bed to her electric chair and slid out of the bed onto the floor

## 2022-01-25 NOTE — PHYSICAL THERAPY INITIAL EVALUATION ADULT - ADDITIONAL COMMENTS
as per pt: resides in the private house with no steps to enter, primary mode of locomotion electric w/c, uses RW for transfers only with few steps to the bedside commode or to the electric W/C, Sons available to assist as needed, they do work and pt is at home by herself

## 2022-01-25 NOTE — PROVIDER CONTACT NOTE (OTHER) - RECOMMENDATIONS
Subacute Rehab, rehab disposition recommendation may be change base on patient  functional progress.

## 2022-01-25 NOTE — ED ADULT NURSE REASSESSMENT NOTE - NS ED NURSE REASSESS COMMENT FT1
pt rec'd at 2100, vss, spo2 97% on 3LNC, a&ox4,  pt medicated with pain meds as ordered, relief obtained, PT changed and repositioned in hospital bed, obese and minimal movement in bed, pt enc to assist in helping to turn, Poc reviewed and discussed with pt, verbalizes understanding, bruising noted all over body and scabs, all meds given as ordered,   pt awaitng pt eval  in am, safety maintained, will cont to monitor and assess

## 2022-01-25 NOTE — ED ADULT NURSE REASSESSMENT NOTE - NS ED NURSE REASSESS COMMENT FT1
Assumed care of the patient @4026. Pt A&Ox4, VSS afebrile. 3L NC in place. Pt c/o 10/10 back pain medicated as ordered. Primafit in place clear yellow urine draining. Patient in understanding of plan of care. Patient with no further questions for the RN. Resting in comfort. Call bell within reach and encouraged to use when assistance needed. Will continue to monitor.

## 2022-01-25 NOTE — ED ADULT NURSE REASSESSMENT NOTE - NS ED NURSE REASSESS COMMENT FT1
Pt awake and alert x4, no respiratory distress, VSS afebrile. Pt resting comfortably. Safety maintained.

## 2022-01-25 NOTE — PROVIDER CONTACT NOTE (OTHER) - SITUATION
Needs met, lines intact, and active, precautions maintained and reinforced, NAD.
chart reviewed. order received. pt pending bilateral foot and left elbow xrays. per discussion with alina smith held pending results.

## 2022-01-26 LAB
CULTURE RESULTS: SIGNIFICANT CHANGE UP
GLUCOSE BLDC GLUCOMTR-MCNC: 107 MG/DL — HIGH (ref 70–99)
GLUCOSE BLDC GLUCOMTR-MCNC: 114 MG/DL — HIGH (ref 70–99)
GLUCOSE BLDC GLUCOMTR-MCNC: 118 MG/DL — HIGH (ref 70–99)
GLUCOSE BLDC GLUCOMTR-MCNC: 129 MG/DL — HIGH (ref 70–99)
GLUCOSE BLDC GLUCOMTR-MCNC: 195 MG/DL — HIGH (ref 70–99)
GLUCOSE BLDC GLUCOMTR-MCNC: 92 MG/DL — SIGNIFICANT CHANGE UP (ref 70–99)
SPECIMEN SOURCE: SIGNIFICANT CHANGE UP

## 2022-01-26 PROCEDURE — 99226: CPT

## 2022-01-26 RX ORDER — NYSTATIN CREAM 100000 [USP'U]/G
1 CREAM TOPICAL
Refills: 0 | Status: DISCONTINUED | OUTPATIENT
Start: 2022-01-26 | End: 2022-01-28

## 2022-01-26 RX ADMIN — Medication 12 UNIT(S): at 12:12

## 2022-01-26 RX ADMIN — ATORVASTATIN CALCIUM 80 MILLIGRAM(S): 80 TABLET, FILM COATED ORAL at 20:16

## 2022-01-26 RX ADMIN — BUDESONIDE AND FORMOTEROL FUMARATE DIHYDRATE 2 PUFF(S): 160; 4.5 AEROSOL RESPIRATORY (INHALATION) at 20:17

## 2022-01-26 RX ADMIN — Medication 64 UNIT(S): at 20:16

## 2022-01-26 RX ADMIN — Medication 12 UNIT(S): at 17:06

## 2022-01-26 RX ADMIN — OXYCODONE HYDROCHLORIDE 80 MILLIGRAM(S): 5 TABLET ORAL at 08:53

## 2022-01-26 RX ADMIN — Medication 81 MILLIGRAM(S): at 12:11

## 2022-01-26 RX ADMIN — BUDESONIDE AND FORMOTEROL FUMARATE DIHYDRATE 2 PUFF(S): 160; 4.5 AEROSOL RESPIRATORY (INHALATION) at 08:53

## 2022-01-26 RX ADMIN — Medication 40 MILLIGRAM(S): at 05:31

## 2022-01-26 RX ADMIN — OXYCODONE HYDROCHLORIDE 80 MILLIGRAM(S): 5 TABLET ORAL at 20:16

## 2022-01-26 RX ADMIN — SENNA PLUS 2 TABLET(S): 8.6 TABLET ORAL at 20:16

## 2022-01-26 RX ADMIN — OXYCODONE HYDROCHLORIDE 80 MILLIGRAM(S): 5 TABLET ORAL at 09:40

## 2022-01-26 RX ADMIN — NYSTATIN CREAM 1 APPLICATION(S): 100000 CREAM TOPICAL at 17:05

## 2022-01-26 RX ADMIN — Medication 64 UNIT(S): at 08:52

## 2022-01-26 RX ADMIN — Medication 12 UNIT(S): at 08:52

## 2022-01-26 NOTE — ED ADULT NURSE REASSESSMENT NOTE - NS ED NURSE REASSESS COMMENT FT1
Pt awake and alert x4, no respiratory distress 3LNC, VSS afebrile, Primafit in place draining yellow urine. Pt assisted with ADLs, incontinence care provided. Pt awaiting ROSE placement.

## 2022-01-26 NOTE — ED CDU PROVIDER SUBSEQUENT DAY NOTE - PHYSICAL EXAMINATION
gen: morbidly obese  abd soft nt large panus Gen: Well appearing in NAD, obese,   Head: NC/AT  Neck: trachea midline  Resp:  No distress  Ext: no deformities  Neuro:  A&O appears non focal  Skin:  Warm and dry as visualized  Psych:  Normal affect and mood

## 2022-01-26 NOTE — ED ADULT NURSE REASSESSMENT NOTE - NS ED NURSE REASSESS COMMENT FT1
Assumed care of the patient @1130. Pt A&Ox4, VSS afebrile. Pt awaiting for ROSE placement. Incontinence care provided. Patient in understanding of plan of care. Patient with no further questions for the RN. Resting in comfort. Call bell within reach and encouraged to use when assistance needed. Will continue to monitor.

## 2022-01-26 NOTE — ED ADULT NURSE REASSESSMENT NOTE - NS ED NURSE REASSESS COMMENT FT1
Received pt in b6r a&ox4 rr even and unlabored. Pt awaiting nic placement. no complains at this time. pt educated on plan of care, pt able to successfully teach back plan of care to RN, RN will continue to reeducate pt during hospital stay.

## 2022-01-26 NOTE — ED ADULT NURSE REASSESSMENT NOTE - NS ED NURSE REASSESS COMMENT FT1
Assumed care of patient in b6r. Pt a&ox4 rr even and unlabored. Pt awaiting placement for nic. No complains at this time. pt educated on plan of care, pt able to successfully teach back plan of care to RN, RN will continue to reeducate pt during hospital stay.

## 2022-01-27 LAB
GLUCOSE BLDC GLUCOMTR-MCNC: 124 MG/DL — HIGH (ref 70–99)
GLUCOSE BLDC GLUCOMTR-MCNC: 201 MG/DL — HIGH (ref 70–99)
GLUCOSE BLDC GLUCOMTR-MCNC: 210 MG/DL — HIGH (ref 70–99)
GLUCOSE BLDC GLUCOMTR-MCNC: 287 MG/DL — HIGH (ref 70–99)

## 2022-01-27 PROCEDURE — 99226: CPT

## 2022-01-27 RX ORDER — LANOLIN ALCOHOL/MO/W.PET/CERES
3 CREAM (GRAM) TOPICAL ONCE
Refills: 0 | Status: COMPLETED | OUTPATIENT
Start: 2022-01-27 | End: 2022-01-27

## 2022-01-27 RX ORDER — OXYCODONE AND ACETAMINOPHEN 5; 325 MG/1; MG/1
1 TABLET ORAL EVERY 6 HOURS
Refills: 0 | Status: DISCONTINUED | OUTPATIENT
Start: 2022-01-27 | End: 2022-01-28

## 2022-01-27 RX ADMIN — NYSTATIN CREAM 1 APPLICATION(S): 100000 CREAM TOPICAL at 05:45

## 2022-01-27 RX ADMIN — Medication 81 MILLIGRAM(S): at 11:42

## 2022-01-27 RX ADMIN — OXYCODONE AND ACETAMINOPHEN 1 TABLET(S): 5; 325 TABLET ORAL at 17:12

## 2022-01-27 RX ADMIN — Medication 64 UNIT(S): at 20:30

## 2022-01-27 RX ADMIN — BUDESONIDE AND FORMOTEROL FUMARATE DIHYDRATE 2 PUFF(S): 160; 4.5 AEROSOL RESPIRATORY (INHALATION) at 22:28

## 2022-01-27 RX ADMIN — Medication 12 UNIT(S): at 11:45

## 2022-01-27 RX ADMIN — OXYCODONE AND ACETAMINOPHEN 1 TABLET(S): 5; 325 TABLET ORAL at 15:23

## 2022-01-27 RX ADMIN — OXYCODONE HYDROCHLORIDE 80 MILLIGRAM(S): 5 TABLET ORAL at 05:45

## 2022-01-27 RX ADMIN — ATORVASTATIN CALCIUM 80 MILLIGRAM(S): 80 TABLET, FILM COATED ORAL at 22:32

## 2022-01-27 RX ADMIN — Medication 40 MILLIGRAM(S): at 05:45

## 2022-01-27 RX ADMIN — Medication 12 UNIT(S): at 16:26

## 2022-01-27 RX ADMIN — SENNA PLUS 2 TABLET(S): 8.6 TABLET ORAL at 22:33

## 2022-01-27 RX ADMIN — OXYCODONE HYDROCHLORIDE 80 MILLIGRAM(S): 5 TABLET ORAL at 18:20

## 2022-01-27 RX ADMIN — BUDESONIDE AND FORMOTEROL FUMARATE DIHYDRATE 2 PUFF(S): 160; 4.5 AEROSOL RESPIRATORY (INHALATION) at 08:36

## 2022-01-27 RX ADMIN — Medication 3 MILLIGRAM(S): at 23:51

## 2022-01-27 RX ADMIN — Medication 64 UNIT(S): at 08:36

## 2022-01-27 RX ADMIN — NYSTATIN CREAM 1 APPLICATION(S): 100000 CREAM TOPICAL at 18:56

## 2022-01-27 NOTE — ED ADULT NURSE REASSESSMENT NOTE - NS ED NURSE REASSESS COMMENT FT1
Received patient from dayshiakanksha RN.  Pt AxO4, VSS.  Pt denies chest pain/SOB at this time.  Rt F/A  IV insertion site intact , flushing without difficulty. Observed awake obese not in distress cooperative with behavior in control .Bilateral lower extremities  reddened and swollen with discoloration noted on bilateral big toes No s/s of Hypo/hyperglycemia noted .Encouraged and assisted pt to turn side to side. Support provided ,safety maintained, Safety measures taken, bed in low position, call bell within reach, side rails up x2.  Plan of care explained.  Pt verbalized understanding.  Will continue to monitor.

## 2022-01-27 NOTE — ED ADULT NURSE REASSESSMENT NOTE - NSIMPLEMENTINTERV_GEN_ALL_ED
Specific interventions were implemented:
Implemented All Fall with Harm Risk Interventions:  [place] to call system. Call bell, personal items and telephone within reach. Instruct patient to call for assistance. Room bathroom lighting operational. Non-slip footwear when patient is off stretcher. Physically safe environment: no spills, clutter or unnecessary equipment. Stretcher in lowest position, wheels locked, appropriate side rails in place. Provide visual cue, wrist band, yellow gown, etc. Monitor gait and stability. Monitor for mental status changes and reorient to person, place, and time. Review medications for side effects contributing to fall risk. Reinforce activity limits and safety measures with patient and family. Provide visual clues: red socks.
Implemented All Universal Safety Interventions:  [place] to call system. Call bell, personal items and telephone within reach. Instruct patient to call for assistance. Room bathroom lighting operational. Non-slip footwear when patient is off stretcher. Physically safe environment: no spills, clutter or unnecessary equipment. Stretcher in lowest position, wheels locked, appropriate side rails in place.
Implemented All Fall Risk Interventions:  [place] to call system. Call bell, personal items and telephone within reach. Instruct patient to call for assistance. Room bathroom lighting operational. Non-slip footwear when patient is off stretcher. Physically safe environment: no spills, clutter or unnecessary equipment. Stretcher in lowest position, wheels locked, appropriate side rails in place. Provide visual cue, wrist band, yellow gown, etc. Monitor gait and stability. Monitor for mental status changes and reorient to person, place, and time. Review medications for side effects contributing to fall risk. Reinforce activity limits and safety measures with patient and family.

## 2022-01-27 NOTE — ED CDU PROVIDER SUBSEQUENT DAY NOTE - PHYSICAL EXAMINATION
gen: morbidly obese  abd soft nt large panus General-alert and oriented to person place and time, nontoxic appearing, pleasant cooperative, NAD  HEENT-normocephalic, atraumatic, NT to palp, EOMI, PERRLA, no conjunctival injections,   Chest- Nt to palp, no reproducible pain  Cardio-s1,s2 present, regular rate and rhythm  Resp- talks in full sentences, symmetrical chest rise, CTA bilat,   Abdomen- bowel sounds presnt in all 4 quadrants, soft, NT/ND,   Neuro- no focal deficits, sensation intact

## 2022-01-27 NOTE — ED ADULT NURSE REASSESSMENT NOTE - NS ED NURSE REASSESS COMMENT FT1
Assumed care of the patient at 0730. Verbal report received from Jarrett SANCHEZ ED. Patient A&Ox4. Patient received on hospital bed. No s/s ofacute distress. Patient received on 3L via NC. VSS. B/L LE swelling/erythema, per pt chronic. FS AC HS. PIV patent. Primafit in place draining clear yellow urine. Frequent T&P. Patient pending ROSE placement. Patient in understanding of plan of care. Patient with no further questions for the RN. Resting in comfort. Call bell within reach and encouraged to use when assistance needed. Will continue to monitor. Assumed care of the patient at 0730. Verbal report received from Jarrett SANCHEZ ED. Patient A&Ox4. Patient received on hospital bed. No s/s of acute distress. Patient received on 3L via NC. VSS. B/L LE swelling/erythema, per pt chronic. FS AC HS. PIV patent. Primafit in place draining clear yellow urine. Frequent T&P. Patient pending ROSE placement. Patient in understanding of plan of care. Patient with no further questions for the RN. Resting in comfort. Call bell within reach and encouraged to use when assistance needed. Will continue to monitor.

## 2022-01-27 NOTE — ED ADULT NURSE REASSESSMENT NOTE - NS ED NURSE REASSESS COMMENT FT1
Pt with no complains overnight, pt resting comfortably. pt educated on plan of care, pt able to successfully teach back plan of care to RN, RN will continue to reeducate pt during hospital stay.

## 2022-01-27 NOTE — ED ADULT NURSE REASSESSMENT NOTE - NS ED NURSE REASSESS COMMENT FT1
Received patient from suzanna RN.  Pt AxO4, VSS.  Pt denies chest pain/SOB at this time.. left IV insertion site intact -, flushing without difficulty. Complaint back pain and was medicated as per MD order. NPO instructed for past Midnight verbalized understanding  Safety measures taken, bed in low position, call bell within reach, side rails up x2.  Plan of care explained.  Pt verbalized understanding. Support provided  Will continue to monitor.

## 2022-01-28 ENCOUNTER — NON-APPOINTMENT (OUTPATIENT)
Age: 67
End: 2022-01-28

## 2022-01-28 VITALS
RESPIRATION RATE: 18 BRPM | OXYGEN SATURATION: 98 % | HEART RATE: 77 BPM | TEMPERATURE: 98 F | DIASTOLIC BLOOD PRESSURE: 66 MMHG | SYSTOLIC BLOOD PRESSURE: 130 MMHG

## 2022-01-28 LAB
GLUCOSE BLDC GLUCOMTR-MCNC: 149 MG/DL — HIGH (ref 70–99)
GLUCOSE BLDC GLUCOMTR-MCNC: 203 MG/DL — HIGH (ref 70–99)
GLUCOSE BLDC GLUCOMTR-MCNC: 236 MG/DL — HIGH (ref 70–99)
SARS-COV-2 RNA SPEC QL NAA+PROBE: SIGNIFICANT CHANGE UP

## 2022-01-28 PROCEDURE — 94640 AIRWAY INHALATION TREATMENT: CPT

## 2022-01-28 PROCEDURE — 36600 WITHDRAWAL OF ARTERIAL BLOOD: CPT

## 2022-01-28 PROCEDURE — 85730 THROMBOPLASTIN TIME PARTIAL: CPT

## 2022-01-28 PROCEDURE — 85610 PROTHROMBIN TIME: CPT

## 2022-01-28 PROCEDURE — 73562 X-RAY EXAM OF KNEE 3: CPT

## 2022-01-28 PROCEDURE — 71045 X-RAY EXAM CHEST 1 VIEW: CPT

## 2022-01-28 PROCEDURE — 96375 TX/PRO/DX INJ NEW DRUG ADDON: CPT

## 2022-01-28 PROCEDURE — 86901 BLOOD TYPING SEROLOGIC RH(D): CPT

## 2022-01-28 PROCEDURE — 97163 PT EVAL HIGH COMPLEX 45 MIN: CPT

## 2022-01-28 PROCEDURE — G0378: CPT

## 2022-01-28 PROCEDURE — 90472 IMMUNIZATION ADMIN EACH ADD: CPT

## 2022-01-28 PROCEDURE — 93005 ELECTROCARDIOGRAM TRACING: CPT

## 2022-01-28 PROCEDURE — 86900 BLOOD TYPING SEROLOGIC ABO: CPT

## 2022-01-28 PROCEDURE — 96374 THER/PROPH/DIAG INJ IV PUSH: CPT

## 2022-01-28 PROCEDURE — 82803 BLOOD GASES ANY COMBINATION: CPT

## 2022-01-28 PROCEDURE — 85025 COMPLETE CBC W/AUTO DIFF WBC: CPT

## 2022-01-28 PROCEDURE — 84484 ASSAY OF TROPONIN QUANT: CPT

## 2022-01-28 PROCEDURE — 93970 EXTREMITY STUDY: CPT

## 2022-01-28 PROCEDURE — 99285 EMERGENCY DEPT VISIT HI MDM: CPT | Mod: 25

## 2022-01-28 PROCEDURE — U0003: CPT

## 2022-01-28 PROCEDURE — 83880 ASSAY OF NATRIURETIC PEPTIDE: CPT

## 2022-01-28 PROCEDURE — 73630 X-RAY EXAM OF FOOT: CPT

## 2022-01-28 PROCEDURE — 90662 IIV NO PRSV INCREASED AG IM: CPT

## 2022-01-28 PROCEDURE — 36415 COLL VENOUS BLD VENIPUNCTURE: CPT

## 2022-01-28 PROCEDURE — 99217: CPT

## 2022-01-28 PROCEDURE — 87086 URINE CULTURE/COLONY COUNT: CPT

## 2022-01-28 PROCEDURE — 73080 X-RAY EXAM OF ELBOW: CPT

## 2022-01-28 PROCEDURE — 90715 TDAP VACCINE 7 YRS/> IM: CPT

## 2022-01-28 PROCEDURE — 80053 COMPREHEN METABOLIC PANEL: CPT

## 2022-01-28 PROCEDURE — 90471 IMMUNIZATION ADMIN: CPT

## 2022-01-28 PROCEDURE — 82962 GLUCOSE BLOOD TEST: CPT

## 2022-01-28 PROCEDURE — 0064A: CPT

## 2022-01-28 PROCEDURE — U0005: CPT

## 2022-01-28 PROCEDURE — 81001 URINALYSIS AUTO W/SCOPE: CPT

## 2022-01-28 PROCEDURE — 93926 LOWER EXTREMITY STUDY: CPT

## 2022-01-28 PROCEDURE — 86850 RBC ANTIBODY SCREEN: CPT

## 2022-01-28 RX ORDER — INFLUENZA VIRUS VACCINE 15; 15; 15; 15 UG/.5ML; UG/.5ML; UG/.5ML; UG/.5ML
0.7 SUSPENSION INTRAMUSCULAR ONCE
Refills: 0 | Status: COMPLETED | OUTPATIENT
Start: 2022-01-28 | End: 2022-01-28

## 2022-01-28 RX ORDER — POLYETHYLENE GLYCOL 3350 17 G/17G
17 POWDER, FOR SOLUTION ORAL DAILY
Refills: 0 | Status: DISCONTINUED | OUTPATIENT
Start: 2022-01-28 | End: 2022-01-28

## 2022-01-28 RX ORDER — CX-024414 0.2 MG/ML
0.25 INJECTION, SUSPENSION INTRAMUSCULAR ONCE
Refills: 0 | Status: COMPLETED | OUTPATIENT
Start: 2022-01-28 | End: 2022-01-28

## 2022-01-28 RX ADMIN — CX-024414 0.25 MILLILITER(S): 0.2 INJECTION, SUSPENSION INTRAMUSCULAR at 10:09

## 2022-01-28 RX ADMIN — NYSTATIN CREAM 1 APPLICATION(S): 100000 CREAM TOPICAL at 06:04

## 2022-01-28 RX ADMIN — Medication 40 MILLIGRAM(S): at 05:58

## 2022-01-28 RX ADMIN — Medication 12 UNIT(S): at 11:51

## 2022-01-28 RX ADMIN — OXYCODONE HYDROCHLORIDE 80 MILLIGRAM(S): 5 TABLET ORAL at 05:59

## 2022-01-28 RX ADMIN — POLYETHYLENE GLYCOL 3350 17 GRAM(S): 17 POWDER, FOR SOLUTION ORAL at 11:51

## 2022-01-28 RX ADMIN — Medication 1 DROP(S): at 11:51

## 2022-01-28 RX ADMIN — Medication 81 MILLIGRAM(S): at 11:51

## 2022-01-28 RX ADMIN — Medication 64 UNIT(S): at 09:08

## 2022-01-28 RX ADMIN — Medication 12 UNIT(S): at 08:36

## 2022-01-28 RX ADMIN — BUDESONIDE AND FORMOTEROL FUMARATE DIHYDRATE 2 PUFF(S): 160; 4.5 AEROSOL RESPIRATORY (INHALATION) at 09:08

## 2022-01-28 RX ADMIN — INFLUENZA VIRUS VACCINE 0.7 MILLILITER(S): 15; 15; 15; 15 SUSPENSION INTRAMUSCULAR at 11:42

## 2022-01-28 RX ADMIN — OXYCODONE AND ACETAMINOPHEN 1 TABLET(S): 5; 325 TABLET ORAL at 13:29

## 2022-01-28 NOTE — ED CDU PROVIDER SUBSEQUENT DAY NOTE - WR ORDER NAME 2
Xray Chest 1 View- PORTABLE-Urgent
Xray Chest 1 View- PORTABLE-Urgent
Xray Foot AP + Lateral + Oblique, Bilat
Xray Chest 1 View- PORTABLE-Urgent

## 2022-01-28 NOTE — ED ADULT NURSE NOTE - CAS ELECT INFOMATION PROVIDED
DC instructions provided and reviewed with pt. DC paperwork provided to EMS staff. VSS./DC instructions

## 2022-01-28 NOTE — ED CDU PROVIDER DISPOSITION NOTE - ATTENDING CONTRIBUTION TO CARE
I, Komal Zavaleta, participated in the care of this patient with the PA. I discussed the history and physical exam findings as well as lab results and plan of care with the PA. I agree with PA's history, physical and assessment. I agree with final disposition.

## 2022-01-28 NOTE — ED CDU PROVIDER DISPOSITION NOTE - NSFOLLOWUPINSTRUCTIONS_ED_ALL_ED_FT
please continue daily medication  please continue use the Oxygen at the facility as well   please make sure have good glucose control -   YOU HAVE RECEIVED THE COVID BOOSTER ( Moderna in ER ) as well as influenza vaccine . incase of the fever and body aches due to vaccine take Tylenol and follow the instruction    call and follow up with primary care and bring the result      Fall Prevention    WHAT YOU NEED TO KNOW:    Fall prevention includes ways to make your home and other areas safer. It also includes ways you can move more carefully to prevent a fall. Health conditions that cause changes in your blood pressure, vision, or muscle strength and coordination may increase your risk for falls. Medicines may also increase your risk for falls if they make you dizzy, weak, or sleepy.    DISCHARGE INSTRUCTIONS:    Call 911 or have someone else call if:   •You have fallen and are unconscious.      •You have fallen and cannot move part of your body.      Contact your healthcare provider if:   •You have fallen and have pain or a headache.      •You have questions or concerns about your condition or care.      Fall prevention tips:   •Stand or sit up slowly. This may help you keep your balance and prevent falls.      •Use assistive devices as directed. Your healthcare provider may suggest that you use a cane or walker to help you keep your balance. You may need to have grab bars put in your bathroom near the toilet or in the shower.      •Wear shoes that fit well and have soles that . Wear shoes both inside and outside. Use slippers with good . Do not wear shoes with high heels.      •Wear a personal alarm. This is a device that allows you to call 911 if you fall and need help. Ask your healthcare provider for more information.      •Stay active. Exercise can help strengthen your muscles and improve your balance. Your healthcare provider may recommend water aerobics or walking. He or she may also recommend physical therapy to improve your coordination. Never start an exercise program without talking to your healthcare provider first.  Walking for Exercise           •Manage your medical conditions.  Keep all appointments with your healthcare providers. Visit your eye doctor as directed.      Home safety tips:     Fall Prevention for Adults     •Add items to prevent falls in the bathroom. Put nonslip strips on your bath or shower floor to prevent you from slipping. Use a bath mat if you do not have carpet in the bathroom. This will prevent you from falling when you step out of the bath or shower. Use a shower seat so you do not need to stand while you shower. Sit on the toilet or a chair in your bathroom to dry yourself and put on clothing. This will prevent you from losing your balance from drying or dressing yourself while you are standing.      •Keep paths clear. Remove books, shoes, and other objects from walkways and stairs. Place cords for telephones and lamps out of the way so that you do not need to walk over them. Tape them down if you cannot move them. Remove small rugs. If you cannot remove a rug, secure it with double-sided tape. This will prevent you from tripping.      •Install bright lights in your home. Use night lights to help light paths to the bathroom or kitchen. Always turn on the light before you start walking.      •Keep items you use often on shelves within reach. Do not use a step stool to help you reach an item.      •Paint or place reflective tape on the edges of your stairs. This will help you see the stairs better.      Follow up with your doctor as directed: Write down your questions so you remember to ask them during your visits

## 2022-01-28 NOTE — ED CDU PROVIDER SUBSEQUENT DAY NOTE - NS ED ATTENDING STATEMENT MOD
I have personally performed a face to face diagnostic evaluation on this patient. I have reviewed the ACP note and agree with the history, exam and plan of care, except as noted.
Attending with
Attending with
I have personally performed a face to face diagnostic evaluation on this patient. I have reviewed the ACP note and agree with the history, exam and plan of care, except as noted.

## 2022-01-28 NOTE — ED CDU PROVIDER SUBSEQUENT DAY NOTE - NS ED ROS FT
No fever/chills, No photophobia/eye pain/changes in vision, No ear pain/sore throat/dysphagia, No chest pain/palpitations, no SOB/cough/wheeze/stridor, No abdominal pain, No N/V/D, no dysuria/frequency/discharge, No neck/back pain, no rash, no changes in neurological status/function.

## 2022-01-28 NOTE — ED CDU PROVIDER SUBSEQUENT DAY NOTE - ATTENDING CONTRIBUTION TO CARE
PT. awake and alert. Pt. in no acute distress. Pt. waiting for PT consult for possible ROSE. I, Dr. Villatoro, performed a face to face bedside interview with this patient regarding history of present illness, review of symptoms and relevant past medical, social and family history.  I completed an independent physical examination.  I have also reviewed the ACP's note(s) and discussed the plan with the ACP.
Pt. awake and alert. Pt. Pending ROSE placement. I, Dr. Villatoro, performed a face to face bedside interview with this patient regarding history of present illness, review of symptoms and relevant past medical, social and family history.  I completed an independent physical examination.  I have also reviewed the ACP's note(s) and discussed the plan with the ACP.
pt with multiple medical problems recommended for ROSE placement.  pt without current complaints, awaiting sw/cm for placement recommendations
I, Komal Zavaleta, participated in the care of this patient with the PA. I discussed the history and physical exam findings as well as lab results and plan of care with the PA. I agree with PA's history, physical and assessment.     Patient for placement today.

## 2022-01-28 NOTE — ED ADULT NURSE REASSESSMENT NOTE - SYMPTOMS
weakness
chronic back pain/pain:
weakness
chronic back pain/pain:
weakness
none
chronic back pain/pain:
chronic back pain/pain:
Back pain/pain:
pain:
pain:/weakness
Generalized body and back pain

## 2022-01-28 NOTE — ED ADULT NURSE REASSESSMENT NOTE - NS ED NURSE REASSESS COMMENT FT1
Patient resting comfortably and calm on stretcher. Complaint of insomnia NP Pat  made aware and was given MELATONIN 3MG PO STAT. as per NP order   Pt AxO4, VSS,. Pt appears in no distress at this time.  Safety measures taken, bed in low position, call bell within reach, side rails up x2.  Will continue to monitor.

## 2022-01-28 NOTE — ED CDU PROVIDER SUBSEQUENT DAY NOTE - WR ORDER NAME 3
Xray Elbow AP + Lateral + Oblique, Right
Xray Foot AP + Lateral + Oblique, Bilat

## 2022-01-28 NOTE — ED ADULT NURSE REASSESSMENT NOTE - STATUS
ROSE
ROSE placement/awaiting consult
ROSE
ROSE placement/awaiting consult
Awaiiting for placement
Awaiting for Epidural/awaiting consult
ROSE placement/awaiting consult
ROSE placement
awaiting consult

## 2022-01-28 NOTE — ED ADULT NURSE REASSESSMENT NOTE - CONDITION
improved
unchanged
improved
improved
unchanged
improved
unchanged
unchanged
improved
unchanged
improved

## 2022-01-28 NOTE — ED CDU PROVIDER DISPOSITION NOTE - CLINICAL COURSE
66F, morbidly obese, ambulates with a walker due to spinal stenosis, history of DM2 on insulin x 26 years, Asthma with multiple hospitalizations, never intubated, old LLE DVT no longer on A/C x 5 years, Ovarian Ca s/p 2010 MEGAN, CKD3, Hyperlipidemia, HTN, HFpEF@70% on home O2 3L, Insomnia, Vit D Deficiency.  Reports she was trying to move from bed to her electric chair and slid out of the bed onto the floor. Denies head trauma or LOC. Reports she felt room-spinning dizziness assoc/w her fall, had one similar episode 2 weeks ago, no other hx of falls, no dizziness at this time. pt is kept on obs for PT evaluation that recommended Rose . Pt is on the O2 at the base line . seen the pt today at the bed side , c.o some dry Eyes . pt is requesting the covid and flu vaccine the is been ordered. she denies any chest pain or SOB at the base line , covid x 2 negative . pt was pending for authorization for ROSE that is done now .

## 2022-01-28 NOTE — ED ADULT NURSE REASSESSMENT NOTE - COMFORT CARE
plan of care explained/repositioned/wait time explained
meal provided/plan of care explained/po fluids offered/repositioned/wait time explained
plan of care explained/repositioned/side rails up/warm blanket provided
meal provided/plan of care explained/po fluids offered/repositioned/wait time explained
meal provided/plan of care explained/po fluids offered/repositioned/wait time explained
plan of care explained/repositioned/wait time explained
meal provided/plan of care explained/po fluids offered/repositioned
meal provided/plan of care explained/po fluids offered/repositioned/wait time explained
assisted to bedpan/meal provided/plan of care explained/po fluids offered/repositioned/side rails up/wait time explained/warm blanket provided
plan of care explained/po fluids offered/repositioned/side rails up
darkened lights/plan of care explained/side rails up/wait time explained/warm blanket provided
assisted to bedpan/darkened lights/plan of care explained/repositioned/side rails up/treatment delay explained/wait time explained/warm blanket provided

## 2022-01-28 NOTE — ED CDU PROVIDER SUBSEQUENT DAY NOTE - WR ORDER NAME 1
Xray Elbow AP + Lateral + Oblique, Right
Xray Chest 1 View- PORTABLE-Urgent

## 2022-01-28 NOTE — ED CDU PROVIDER SUBSEQUENT DAY NOTE - MEDICAL DECISION MAKING DETAILS
ROSE is set up, pending authorization. consider admission if not placed today
morbid obesity with falls at home   -pt eval   -cm consult for potential nic placement   - fu xray results, low suspicion fracture
ROSE
ROSE

## 2022-01-28 NOTE — CHART NOTE - NSCHARTNOTESELECT_GEN_ALL_CORE
Event Note
SOCIAL WORK NOTE:/Event Note
case mgmt
SOCIAL WORK NOTE:/Event Note

## 2022-01-28 NOTE — ED CDU PROVIDER SUBSEQUENT DAY NOTE - HISTORY
pt in observation for frequent falls with no acute vents overnight. ROSE is set up, pending authorization. pt in observation for frequent falls with no acute events overnight. ROSE is set up, pending authorization.

## 2022-01-28 NOTE — ED ADULT NURSE REASSESSMENT NOTE - NS ED NURSE REASSESS COMMENT FT1
Assumed care of the patient at 0800. Verbal report received from Zulma SANCHEZ Obs. Patient A&Ox4. No s/s of acute distress. Patient on 3L O2 via NC. Chronic back pain+. FS AC HS. VSS. Primafit in place draining clear yellow urine. B/L LE erythema and swelling. Frequent T&P. Patient pending ROSE placement. Patient in understanding of plan of care. Patient with no further questions for the RN. Resting in comfort. Call bell within reach and encouraged to use when assistance needed. Will continue to monitor.

## 2022-01-28 NOTE — ED ADULT NURSE REASSESSMENT NOTE - GENERAL PATIENT STATE
comfortable appearance/cooperative
comfortable appearance/improvement verbalized/smiling/interactive
comfortable appearance/cooperative
comfortable appearance/cooperative
comfortable appearance/cooperative/improvement verbalized/smiling/interactive
resting/sleeping

## 2022-01-28 NOTE — CHART NOTE - NSCHARTNOTEFT_GEN_A_CORE
SOCIAL WORK NOTE:  AUTHORIZATION PROCESS STARTED FOR THIS PATIENT TO BE TRANSFERRED TO [facility] FOR [facility]. SW WILL CONTINUE TO FOLLOW FOR EVENTUAL TRANSFER TO [facility] ONCE AUTH OBTAINED.
SOCIAL WORK NOTE:  LAUREEN AT [facility] OBTAINED THE AUTH FOR # [id number] THE PATIENT TO GO TO [facility] THERE TODAY.  MADE [facility] CLERICAL AWARE THAT AUTH WAS OBTAINED.  ARRANGEMENTS MADE FOR THE PATIENT TO BE TRANSFERRED TO [facility] LOCATED AT [address] [place] [address] FOR A 2;30 PM PICKUP AND TRANSFER.  NEAF LEFT ON DC RACK AND TRANSPORTATION BILLING FORM PROVIDED TO PATIENT AS SHE IS ALERT AND ORIENTED AT THIS TIME.  REQUESTED WITH RN TO PROVIDE PATIENT EXTRA CLOTHING AND SOCKS TO ALLOW TIME FOR SON TO ARRIVE WITH HER CLOTHING ON SUNDAY SECONDARY TO THE STORM ON SATURDAY.  PLACED CALL TO HER SON- BOBBI # [phone] PER PATIENT'S REQUEST  TO MAKE HIM AWARE AND HE IS IN AGREEMENT WITH TRANSFER.
SOCIAL WORK NOTE:  THIS WORKER DISCUSSED CASE WITH THE TEAM. RECEIVED REPORT FROM EVENING  THAT PATIENT IS IN AGREEMENT FOR ROSE PLAN. PATIENT HAS [employer] MEDICARE  AND WILL NEED AUTH FOR ROSE LEVEL OF CARE.  PATIENT IS FULLY VACCINATED WITH BERENICE [facility] BERENICE AS OF MARCH 2021.  GINA AND ADDITIONAL CLINICALS SENT TO [facility] SNFS AS PATIENT HAS BARIATRIC TO BARRIERS TO ROSE PLACEMENT AND INSURANCE LINITATIONS.
SOCIAL WORK NOTE: PATIENT NOW ACCEPTED TO [facility] WHICH IS FRANKY'S [facility]ISTER FACILTIY.  LAUREEN AT [facility] CONFIRMED THAT PATIENT HAS A BED.  [employer] WOULD NOT CHANGE THE AUTH OVER TO THE NEW SNF AS THEY NEEED TO RE-START THE ENTIRE CASE FORM THE BEGNNING. STILL AWAITING AUTH AT THIS TIME.
CCC met w/patient who came to the ER for a fall. Patient from home. Patient states for the past couple of months, she is having a difficult time being able to stand and walk with her RW.  Patient currently uses her RW to transfer from her bed to the commode or her w/c and back to the bed, which she's also having a very difficult time doing.  Patient states the majority of the time she stays in bed.  Sometimes she will sit in her w/c but has too much pain in her back to stay there a long time. Patient is on continual oxygen through [facility]. Patient lives w/her 2 sons. Her one son is not home during the day as he is in college, her other son has Down Syndrome.  There is an aide in the house for her son with the Down Syndrome who will also lend a helping hand at times for the patients needs.  Patient is not eligilbe for [facility] and cannot afford private hire.  Patient is requesting some rehab to get stronger, would agree to ROSE, but wants to be able to return home afterward.  PT eval pending.  CCC/SW to follow.
Elmer; worker met with pt to inform her about the facilities offering a bed ([facility], [facility], [facility], [facility]) . States she is not sure of any ,pt would like to speak with her sons, she would like a facility closer to them in order to visit her, worker made pt aware about possible limitations in visits due to the Covid situation. Encouraged to understand auth is required and needs to be requested asap. Per pt she would like [facility] in [place] ,she will inform SW if anything different after talking to her children. Auth will be requested via SW clerical. SW to follow.
SOCIAL WORK NOTE:  PATIENT IS PENDIGN AUTHORIZATION FROM [employer]ICARE AT THIS TIME FOR TRANSFER TO [facility] LEVEL OF CARE AT [facility].

## 2022-01-28 NOTE — ED CDU PROVIDER SUBSEQUENT DAY NOTE - PHYSICAL EXAMINATION
gen: morbidly obese  abd soft nt large panus   in NAD gen: morbidly obese  abd soft,  large panus   in NAD

## 2022-01-28 NOTE — ED CDU PROVIDER DISPOSITION NOTE - PATIENT PORTAL LINK FT
You can access the FollowMyHealth Patient Portal offered by [facility] by registering at the following website: [email or web address]. By joining [facility]’s FollowMyHealth portal, you will also be able to view your health information using other applications (apps) compatible with our system.

## 2022-01-31 ENCOUNTER — APPOINTMENT (OUTPATIENT)
Dept: INTERNAL MEDICINE | Facility: CLINIC | Age: 67
End: 2022-01-31

## 2022-02-03 ENCOUNTER — APPOINTMENT (OUTPATIENT)
Dept: INTERNAL MEDICINE | Facility: CLINIC | Age: 67
End: 2022-02-03
Payer: COMMERCIAL

## 2022-02-03 VITALS
OXYGEN SATURATION: 99 % | SYSTOLIC BLOOD PRESSURE: 130 MMHG | RESPIRATION RATE: 16 BRPM | DIASTOLIC BLOOD PRESSURE: 70 MMHG | HEIGHT: 63 IN | HEART RATE: 79 BPM

## 2022-02-03 DIAGNOSIS — B37.2 CANDIDIASIS OF SKIN AND NAIL: ICD-10-CM

## 2022-02-03 DIAGNOSIS — G81.90 HEMIPLEGIA, UNSPECIFIED AFFECTING UNSPECIFIED SIDE: ICD-10-CM

## 2022-02-03 LAB — HBA1C MFR BLD HPLC: 7.1

## 2022-02-03 PROCEDURE — 83036 HEMOGLOBIN GLYCOSYLATED A1C: CPT | Mod: QW

## 2022-02-03 PROCEDURE — 99214 OFFICE O/P EST MOD 30 MIN: CPT | Mod: 25

## 2022-02-03 NOTE — PHYSICAL EXAM
[No Acute Distress] : no acute distress [Well Nourished] : well nourished [Well Developed] : well developed [Well-Appearing] : well-appearing [No JVD] : no jugular venous distention [No Respiratory Distress] : no respiratory distress  [Clear to Auscultation] : lungs were clear to auscultation bilaterally [Normal Rate] : normal rate  [Regular Rhythm] : with a regular rhythm [Normal S1, S2] : normal S1 and S2 [de-identified] : O2 3 L in place [de-identified] : Extremities were cool to touch bilaterally with cyanotic appearance on the toes along with evidence of dry gangrene in multiple areas [de-identified] : Obesely distended [de-identified] : Positive Candida in intertriginous areas

## 2022-02-03 NOTE — ASSESSMENT
[FreeTextEntry1] : 1.  Diabetes is relatively controlled at this time with a hemoglobin A1c of 7.1.  Expressed to patient that this is a good blood sugar for now as it will have to worry about hypoglycemia which she has had in the past.\par 2.  Chronic pain -patient admits that she has been using her pain meds appropriately but when she does she can get lightheaded and dizzy and this is possibly why she fell.  She has not taken any clonazepam in a week and will DC at this time.  She is also only taking the OxyContin and has not needed any Vicodin for breakthrough.  As she has pulled back on a lot of her pain medicines in a very short period of time we will continue with the OxyContin 80 mg twice daily this month with a plan to decrease her to 60 mg twice daily and further taper as tolerated.  I believe she really is at the point where she knows that she has to do something to help herself.\par 3.  Super morbid obesity -referred to the weight management program who can consider Ozempic or other diabetes medications to help with weight loss and help with heart disease.  Also with needed good support system through them.\par 4.  Dry gangrene of toes -podiatry has been coming to the house to debride but I want her seen by a vascular surgeon.  She was good given the name of the vascular surgeon in [place].\par 5.  Pulmonary -has never had a sleep study and would benefit from 1.  In addition she is likely pickwickian and has become O2 dependent.  Per patient her O2 sats dropped into the 80s the minute she is off of her oxygen.  It was explained to her that this is more cardiac in nature and I am not sure who is been supplying her oxygen.  Needs follow-up with pulmonary.  I gave her the name of a pulmonologist in [place] as this may be easier for her to get to.\par 6.  History of CHF -refer to cardiology in [place].\par 7.  Mobility issues -contacted [facility]'s rehab at home.\par 8.  Candidiasis in intertriginous areas  -continue with nystatin powder but will add 10 days of Diflucan to help clear as she has extensive areas of rash.\par 9.  Advised to schedule a telehealth visit in 3 weeks to continue with close follow-up.

## 2022-02-03 NOTE — HISTORY OF PRESENT ILLNESS
[de-identified] : Yanet comes in today for follow-up after leaving AM from rehab.  She had fallen last week and was in the able to get up and was admitted to the observation unit in the ED and subsequently went [facility] [place].  She said it was a horrible experience and very degrading.  No one would help her or turn in position her.  In fact one of the aides told her this is not a hospital what you want me to do.  Another COVID just pain in the bed.  She found it very difficult to move in the beds that were in her room in the room smelled like urine and the place was not clean.  She could not get anybody in administration to come and speak with her and finally signed out AGAINST MEDICAL ADVICE.\par \par She complains of a rash in her intertriginous areas and blisters on her toes that are now blackening.  Podiatry has been coming to the house to debride them and she was told in the past this was venous and not arterial.\par \par In terms of her pain medicines and clonazepam she has cut back within the last week.  She tells me she has not overused but when using a regularly prescribed doses it has contributed to lightheadedness and that may be one of the reasons why she fell.  She is not taking any clonazepam for the last week nor has she been taking any Vicodin.  She has been continuing with her OxyContin as she does okay with this.\par \par She continues on home O2 as her sats dropped into the lower 80s when she is off of it.  Has never had a sleep study to see if she has sleep apnea which I suspect she does.

## 2022-02-16 ENCOUNTER — APPOINTMENT (OUTPATIENT)
Dept: INTERNAL MEDICINE | Facility: CLINIC | Age: 67
End: 2022-02-16
Payer: COMMERCIAL

## 2022-02-16 PROCEDURE — 99213 OFFICE O/P EST LOW 20 MIN: CPT | Mod: 95

## 2022-02-22 NOTE — HISTORY OF PRESENT ILLNESS
[Home] : at home, [unfilled] , at the time of the visit. [Medical Office: ([facility])___] : at the medical office located in  [FreeTextEntry1] : 66-year-old female with a complicated past medical history including diabetes, left hemiparesis secondary to cervical disease that required decompression and left her with residual weakness and tremor upper greater than lower, super morbid obesity, hypertension, endometrial cancer, episodes of cellulitis and chronic pain who scheduled this telehealth visit today to follow-up on our visit on 2/3/2022.  At that visit she was tasked with scheduling appointments with cardiology and pulmonary for worsening dyspnea on exertion and persistent O2 requirements but has yet to schedule those appointments.  We also decided to start tapering her off of some of her chronic pain medications as she admits that it may have contributed to a fall.  She is tolerating only being on her OxyContin and has not used anything for breakthrough.  She tells me that podiatry came to the house and debrided the necrotic areas off of her toes. [Verbal consent obtained from patient] : the patient, [unfilled] [de-identified] : Hemiparesis secondary to cervical

## 2022-02-22 NOTE — ASSESSMENT
[FreeTextEntry1] : 1.  Chronic pain tolerating not using any breakthrough medication.  Moving forward the plan will be to decrease the OxyContin dose incrementally over the next several months.  Will plan to decrease the OxyContin to 60 mg every 12 hours with her next refill.\par 2.  Chronic dyspnea on exertion and intermittent chest pain -can stressed importance of scheduling an appointment with cardiology\par 3.  O2 dependence -needs pulmonary evaluation.  Again stressed importance of scheduling an appointment.  It is difficult for her to get out of the house and her son needs to rent a van so she was hoping to have the 2 visits on the same day but I told her this is going to push the visits too far out.\par 4.  Call in 2 weeks to discuss her chronic pain needs at that time.

## 2022-02-22 NOTE — PHYSICAL EXAM
[No Acute Distress] : no acute distress [Well Nourished] : well nourished [Well Developed] : well developed [No Respiratory Distress] : no respiratory distress  [de-identified] : O2 2 L nasal cannula in place

## 2022-02-24 ENCOUNTER — NON-APPOINTMENT (OUTPATIENT)
Age: 67
End: 2022-02-24

## 2022-02-25 ENCOUNTER — RX RENEWAL (OUTPATIENT)
Age: 67
End: 2022-02-25

## 2022-03-04 ENCOUNTER — NON-APPOINTMENT (OUTPATIENT)
Age: 67
End: 2022-03-04

## 2022-03-08 ENCOUNTER — RX RENEWAL (OUTPATIENT)
Age: 67
End: 2022-03-08

## 2022-03-21 ENCOUNTER — APPOINTMENT (OUTPATIENT)
Dept: PULMONOLOGY | Facility: CLINIC | Age: 67
End: 2022-03-21
Payer: MEDICARE

## 2022-03-21 VITALS
RESPIRATION RATE: 16 BRPM | HEART RATE: 79 BPM | SYSTOLIC BLOOD PRESSURE: 130 MMHG | OXYGEN SATURATION: 95 % | DIASTOLIC BLOOD PRESSURE: 76 MMHG

## 2022-03-21 DIAGNOSIS — I27.20 PULMONARY HYPERTENSION, UNSPECIFIED: ICD-10-CM

## 2022-03-21 DIAGNOSIS — R40.0 SOMNOLENCE: ICD-10-CM

## 2022-03-21 DIAGNOSIS — R06.00 DYSPNEA, UNSPECIFIED: ICD-10-CM

## 2022-03-21 PROCEDURE — 99204 OFFICE O/P NEW MOD 45 MIN: CPT

## 2022-03-21 RX ORDER — FLUCONAZOLE 150 MG/1
150 TABLET ORAL DAILY
Qty: 10 | Refills: 0 | Status: DISCONTINUED | COMMUNITY
Start: 2022-02-03 | End: 2022-03-21

## 2022-03-21 RX ORDER — BUDESONIDE AND FORMOTEROL FUMARATE DIHYDRATE 160; 4.5 UG/1; UG/1
160-4.5 AEROSOL RESPIRATORY (INHALATION)
Refills: 0 | Status: DISCONTINUED | COMMUNITY
Start: 2020-07-23 | End: 2022-03-21

## 2022-03-21 RX ORDER — IPRATROPIUM BROMIDE 0.5 MG/2.5ML
0.02 SOLUTION RESPIRATORY (INHALATION)
Qty: 2 | Refills: 5 | Status: ACTIVE | COMMUNITY
Start: 2019-08-12

## 2022-03-21 NOTE — REVIEW OF SYSTEMS
[Epistaxis] : epistaxis [Constipation] : constipation [Back Pain] : back pain [Chronic Pain] : chronic pain [Clotting Disorder/ Frequent bleeding] : clotting disorder/ frequent bleeding [Depression] : depression [Diabetes] : diabetes [Obesity] : obesity [Fever] : no fever [Recent Wt Gain (___ Lbs)] : ~T no recent weight gain [Chills] : no chills [Recent Wt Loss (___ Lbs)] : ~T no recent weight loss [Sinus Problems] : no sinus problems [Cough] : no cough [Hemoptysis] : no hemoptysis [Sputum] : no sputum [Dyspnea] : no dyspnea [Wheezing] : no wheezing [GERD] : no gerd [Abdominal Pain] : no abdominal pain [Nausea] : no nausea [Vomiting] : no vomiting [Bleeding] : no bleeding [Rash] : no rash [Blood Transfusion] : no blood transfusion [Easy Bruising] : no easy bruising [Anxiety] : no anxiety [Panic Attacks] : no panic attacks [Thyroid Problem] : no thyroid problem [TextBox_3] : after injury   increase  weight  [TextBox_113] : DVT  left leg    rigth leg

## 2022-03-21 NOTE — ASSESSMENT
[FreeTextEntry1] : 65y/o  never smoker\par \par 1- obesity  BMI > 70  ? sleep disorder\par 2- chronic back pain  on opioids\par 3- moderate persistent asthma   (PFT moderate obstructive  airflow pattern/  restrictive ventilatory defect -obesity\par 4- DM\par 5- HFpEF\par 6-vaccinations   covid+\par \par \par recommendations\par 1- advair bid\par 2- albuterol MDI   add spacer  2 inhalation  q 4 prn \par 3- nebulizer at home\par 4- agrees to home sleep study\par 5-d dimer\par 6-  ct chest  follow up atelectasis diaphragm elevation \par 7-pft  covid\par 8-  dermatology for skin \par 9- ent upper airway inspection \par 10- discussed  steady increase in weight  -  debilitated  and likely cause of sob\par 11-  hematology for h/o DVT x 2 \par \par return after above \par \par -patient and son agree 
eye pain traumatic

## 2022-03-21 NOTE — PHYSICAL EXAM
[Supple] : supple [No Neck Mass] : no neck mass [No JVD] : no jvd [Normal S1, S2] : normal s1, s2 [No Murmurs] : no murmurs [Clear to Auscultation Bilaterally] : clear to auscultation bilaterally [No Masses] : no masses [Soft] : soft [No Motor Deficits] : no motor deficits [Normal Affect] : normal affect [TextBox_2] : obese  f  no distress speaking full sentences    [TextBox_11] : crowded airway no  thrush  [TextBox_68] : upper airway  mild w    [TextBox_89] : large pannus [TextBox_105] : does not walk    edema  legs    charcot  [TextBox_125] : lesions    skin

## 2022-03-21 NOTE — HISTORY OF PRESENT ILLNESS
[Never] : never [Awakes with Headache] : awakes with headache [Daytime Somnolence] : daytime somnolence [Difficulty Maintaining Sleep] : difficulty maintaining sleep [Snoring] : snoring

## 2022-03-31 ENCOUNTER — APPOINTMENT (OUTPATIENT)
Age: 67
End: 2022-03-31
Payer: MEDICARE

## 2022-03-31 PROCEDURE — ZZZZZ: CPT

## 2022-03-31 NOTE — H&P PST ADULT - NS PRO LACT YNNA
[FreeTextEntry1] : Previous testing:\par -CT calcium score January 2022: 0\par -Labs Dec/February 2022: A1c 6.4 Total Chol 253 HDL 74  AST 33 Alt 56\par -Echo 12/2021: EF 55-60% Mild MR Normal LV systolic function and no seg. wall motion abnormalities. Normal PASP. \par -EKG December 2021: Sinus rhythm, LBBB\par -Labs May 2021: LDL is 176, HDL 75, .  .  Normal creatinine.  AST 41, ALT 85.  TSH 5.7.\par -Echo 2012, normal\par -Stress echocardiogram 2012: Negative for ischemia.\par -Ultrasound liver in April 2021: Fatty liver.\par -BMP February 2022\par 
no

## 2022-04-14 ENCOUNTER — NON-APPOINTMENT (OUTPATIENT)
Age: 67
End: 2022-04-14

## 2022-04-14 LAB
ANION GAP SERPL CALC-SCNC: 14 MMOL/L
BUN SERPL-MCNC: 29 MG/DL
CALCIUM SERPL-MCNC: 9.8 MG/DL
CHLORIDE SERPL-SCNC: 101 MMOL/L
CO2 SERPL-SCNC: 27 MMOL/L
CREAT SERPL-MCNC: 1.63 MG/DL
DEPRECATED D DIMER PPP IA-ACNC: 161 NG/ML DDU
EGFR: 34 ML/MIN/1.73M2
ENA SCL70 IGG SER IA-ACNC: <0.2 AL
GLUCOSE SERPL-MCNC: 152 MG/DL
MAGNESIUM SERPL-MCNC: 2 MG/DL
PHOSPHATE SERPL-MCNC: 3.4 MG/DL
POTASSIUM SERPL-SCNC: 4.4 MMOL/L
SARS-COV-2 N GENE NPH QL NAA+PROBE: NOT DETECTED
SODIUM SERPL-SCNC: 142 MMOL/L

## 2022-05-16 NOTE — PROGRESS NOTE ADULT - NUTRITIONAL ASSESSMENT
Cardiology Clinic Progress Note:    May 16, 2022   Patient Name: Laureen Kohler  Patient MRN: [id number]     Consult indication: HTN    HPI:    I had the opportunity to see patient Laureen Kohler in cardiology clinic for a follow up visit.     As you know, patient is a pleasant 22-year-old female with a past medical history significant for obesity, exercise-induced asthma, family history of early ASCVD, dyslipidemia in childhood, who presents for follow up.    I had initially met patient in clinic for a consultation on 2/15/2022 regarding family history of early ASCVD.  At that time, she was noted to be mildly tachycardic, and also reported a longstanding issue with hypertension.     Patient reports that when she was approximately 9 years old, she was found to have elevated cholesterol levels.  Her father passed away from an MI at age 50, and her uncle is currently hospitalized on the [facility] with heart related issues, and patient notes that he had an abnormal Lp(a) level.      Patient recently moved from the [facility] to the [place].  She works as a dietitian.  At baseline, she is not as physically active as she would like, previously exercised regularly, though had issues with anxiety and notes that her relationship with gym/diet was unhealthy, though she has since moved past this.  She denies any chest pain, chest pressure, abnormal shortness of breath.  No recent change in exertional capacity.  She uses an inhaler as needed, also is on oral contraceptives for birth control.    Patient denies excessive daytime somnolence, nonrestorative sleep, she states that her partner does not report that she snores excessively.  She did have a tonsillectomy in childhood.  For further evaluation, labs were obtained notable for TSH normal, hCG qualitative negative, , AST 56, total cholesterol 265.  Hemoglobin A1c 6.1,  (direct), LP(a) 40, triglycerides 343.  Potassium 4, sodium 135, creatinine  0.61.      Patient underwent a 24-hour blood pressure monitor 2/22/2022 that demonstrated average blood pressures generally 125/80 1 mmHg, average heart rate 100 bpm.  A Holter monitor was done to assess average heart rate, average heart rate was 106 bpm.  Holter monitor reported a possible 15-minute burden of atrial fibrillation, however waveforms of this were not captured, it did demonstrate some sinus arrhythmia, likely artifact.  Patient was started on labetalol 200 mg twice daily follow-up Holter monitor 3/21/2022 to 3/29/2022 demonstrated sinus rhythm with an average heart rate of 87 bpm, rare PACs and rare PVCs.  No atrial fibrillation.     She does not drink alcohol regularly, does not smoke cigarettes, denies recreational drug use.     She does snore, however denies excessive daytime somnolence, nonrestorative sleep.  She had a tonsillectomy in early childhood.     ECG demonstrates normal sinus rhythm.    Assessment and Plan/Recommendations:    # HTN, sinus tachycardia.  Hypertension is borderline, however she does have elevated resting heart rates, which has been managed with low-dose labetalol.  Suspect hypertension may be related to oral contraceptive, however will need further investigation for secondary causes.  Denies symptoms concerning for sleep apnea.   # FH of early ASCVD  # Dyslipidemia  # Anxiety  # Exercise-induced asthma  # Obesity  # Elevated ALT/AST, concern for NAFLD, recommended follow up with PCP (results note 2/15/22)  # Elevated HA1c 6.1%, recommended follow up with PCP (results note 2/15/22)    - Will continue labetalol 200 mg twice daily for now however will need investigation for secondary causes  - Renal artery Doppler ultrasounds  - Plasma free metanephrines  - 24-hour urinary free cortisol excretion  - Depending on results, may need referral to Endocrinology  - Referral to OB/GYN for evaluation/management for alternative contraceptive option that may have less of a risk of causing  hypertension  - Follow-up with cardiology THOMAS in 3 months for reassessment.  If feeling generally well at that time, recommend starting fish oil supplementation due to statin contraindication for family planning/contraception    Thank you for allowing our team to participate in the care of Laureen GANDARA Syedharsha.  Please do not hesitate to call or page me with any questions or concerns.    Sincerely,     Prem Rodríguez MD, [facility]  Cardiology  Text Page   May 16, 2022    Voice recognition software utilized.     Total time spent on this encounter: 35 minutes, providing care in this encounter including, but not limited to, reviewing prior medical records, laboratory data, imaging studies, diagnostic studies, procedure notes, formulating an assessment and plan, recommendations, discussion and counseling with patient face to face, dictation.    Past Medical History:     Past Medical History:   Diagnosis Date     Anxiety 2/15/2022     Benign essential hypertension 2/15/2022     Dyslipidemia 2/15/2022     Exercise-induced asthma 2/15/2022     Family history of dyslipidemia 2/15/2022     Family history of ischemic heart disease 2/15/2022     Morbid obesity (H) 2/15/2022        Past Surgical History:   History reviewed. No pertinent surgical history.    Medications (outpatient):  Current Outpatient Medications   Medication Sig Dispense Refill     albuterol (PROAIR HFA/PROVENTIL HFA/VENTOLIN HFA) 108 (90 Base) MCG/ACT inhaler Inhale 2 puffs into the lungs every 6 hours as needed for shortness of breath / dyspnea or wheezing       Aspirin 81 MG CAPS Take by mouth daily       labetalol (NORMODYNE) 200 MG tablet Take 1 tablet (200 mg) by mouth 2 times daily 240 tablet 0     norgestimate-ethinyl estradiol (ORTHO-CYCLEN) 0.25-35 MG-MCG tablet Take 1 tablet by mouth daily         Allergies:  Allergies   Allergen Reactions     Sulfa Drugs      rash       Social History:   History   Drug Use Not on file      History   Smoking  "Status     Never Smoker   Smokeless Tobacco     Never Used     Social History    Substance and Sexual Activity      Alcohol use: Yes        Comment: 2 drinks month       Family History:  Family History   Problem Relation Age of Onset     Myocardial Infarction Father      Myocardial Infarction Maternal Grandfather      Myocardial Infarction Paternal Grandmother      Myocardial Infarction Paternal Grandfather      Myocardial Infarction Paternal Uncle        Review of Systems:   A complete review of systems was negative except as mentioned in the History of Present Illness.     Objective & Physical Exam:  /88   Pulse 88   Ht 1.702 m (5' 7\")   Wt 121.7 kg (268 lb 6.4 oz)   BMI 42.04 kg/m    Wt Readings from Last 2 Encounters:   05/16/22 121.7 kg (268 lb 6.4 oz)   02/15/22 122.2 kg (269 lb 8 oz)     Body mass index is 42.04 kg/m .   Body surface area is 2.4 meters squared.    Constitutional: appears stated age, in no apparent distress, appears to be well nourished  Head: normocephalic, atraumatic  Neck: supple, trachea midline  Pulmonary: clear to auscultation bilaterally, no wheezes, no rales, no increased work of breathing  Cardiovascular: JVP normal, regular rate, regular rhythm, normal S1 and S2, no S3, S4, no murmur appreciated, no lower extremity edema  Gastrointestinal: no guarding, non-rigid   Neurologic: awake, alert, moves all extremities  Skin: no jaundice, warm on limited exam  Psychiatric: affect is normal, answers questions appropriately, oriented to self and place    Data reviewed:  Lab Results   Component Value Date    WBC 9.5 02/15/2022    RBC 5.28 (H) 02/15/2022    HGB 15.4 02/15/2022    HCT 45.9 02/15/2022    MCV 87 02/15/2022    MCH 29.2 02/15/2022    MCHC 33.6 02/15/2022    RDW 11.7 02/15/2022     02/15/2022     Sodium   Date Value Ref Range Status   02/15/2022 135 133 - 144 mmol/L Final     Potassium   Date Value Ref Range Status   02/15/2022 4.0 3.4 - 5.3 mmol/L Final     Chloride "   Date Value Ref Range Status   02/15/2022 104 94 - 109 mmol/L Final     Carbon Dioxide (CO2)   Date Value Ref Range Status   02/15/2022 22 20 - 32 mmol/L Final     Anion Gap   Date Value Ref Range Status   02/15/2022 9 3 - 14 mmol/L Final     Glucose   Date Value Ref Range Status   02/15/2022 118 (H) 70 - 99 mg/dL Final     Urea Nitrogen   Date Value Ref Range Status   02/15/2022 9 7 - 30 mg/dL Final     Creatinine   Date Value Ref Range Status   02/15/2022 0.61 0.52 - 1.04 mg/dL Final     GFR Estimate   Date Value Ref Range Status   02/15/2022 >90 >60 mL/min/1.73m2 Final     Comment:     Effective 2021 eGFRcr in adults is calculated using the  CKD-EPI creatinine equation which includes age and gender (Violeta et al., NEJ, DOI: 10.1056/JJEHyb1292184)     Calcium   Date Value Ref Range Status   02/15/2022 9.6 8.5 - 10.1 mg/dL Final     Bilirubin Total   Date Value Ref Range Status   02/15/2022 0.5 0.2 - 1.3 mg/dL Final     Alkaline Phosphatase   Date Value Ref Range Status   02/15/2022 85 40 - 150 U/L Final     ALT   Date Value Ref Range Status   02/15/2022 104 (H) 0 - 50 U/L Final     AST   Date Value Ref Range Status   02/15/2022 56 (H) 0 - 45 U/L Final     Recent Labs   Lab Test 02/15/22  0952   CHOL 265*   HDL 35*   *  161*   TRIG 343*      Lab Results   Component Value Date    A1C 6.1 02/15/2022        Recent Results (from the past 4320 hour(s))   Echocardiogram Complete   Result Value    LVEF  65-70%    Narrative    [id number]  [id number]  [id number]  [id number]^CORBY^GEN^GARY     [facility]  [facility]  [address]  [place] [address]  Phone [phone]  Fax [phone]     Name: HI BOGGS  MRN: [id number]  : 1999  Study Date: 02/15/2022 01:50 PM  Age: 22 yrs  Gender: Female  Patient Location: [facility]  Reason For Study: Benign essential hypertension  Ordering Physician: GEN TAVAREZ  GARY  Referring Physician: GEN TAVAREZ  Performed By: Paradise Busch     BSA: 2.3 m2  Height: 67 in  Weight: 269 lb  HR: 131  BP: 137/105 mmHg  ______________________________________________________________________________  Procedure  Complete Echo Adult. Optison (NDC #6417-2698) given intravenously. Technically  difficult study.     ______________________________________________________________________________  Interpretation Summary     The rhythm was sinus tachycardia of 130 BPM.  Technically difficult study.  Left ventricular systolic function is normal.  The visual ejection fraction is 65-70%.  No regional wall motion abnormalities noted.  The right ventricle is normal in size and function.  Cardiac valves not well visualized. No significant valvular disease based on  Doppler data.  Trivial pericardial effusion (likely normal pericardial fluid).     There is no comparison study available.  ______________________________________________________________________________  Left Ventricle  The left ventricle is normal in size. There is normal left ventricular wall  thickness. Left ventricular systolic function is normal. The visual ejection  fraction is 65-70%. Diastolic function not assessed due to tachycardia. No  regional wall motion abnormalities noted.     Right Ventricle  The right ventricle is normal in size and function.     Atria  Normal left atrial size. Right atrial size is normal.     Mitral Valve  The mitral valve leaflets appear normal. There is no evidence of stenosis,  fluttering, or prolapse. There is trace mitral regurgitation.     Tricuspid Valve  The tricuspid valve is not well visualized. There is trace tricuspid  regurgitation. Right ventricular systolic pressure could not be approximated  due to inadequate tricuspid regurgitation.     Aortic Valve  The aortic valve is trileaflet. The aortic valve is not well visualized. No  aortic regurgitation is present. No aortic stenosis is present.     Pulmonic  Valve  The pulmonic valve is not well visualized.     Vessels  The aortic root is normal size. Normal size ascending aorta. Inferior vena  cava not well visualized for estimation of right atrial pressure.     Pericardium  Trivial pericardial effusion. Likely normal pericardial fluid.     Rhythm  The rhythm was sinus tachycardia.     ______________________________________________________________________________  MMode/2D Measurements & Calculations  IVSd: 1.2 cm  LVIDd: 3.9 cm  LVIDs: 2.3 cm  LVPWd: 1.2 cm  FS: 41.7 %  LV mass(C)d: 150.9 grams  LV mass(C)dI: 65.8 grams/m2  Ao root diam: 2.8 cm  LA dimension: 3.5 cm     asc Aorta Diam: 3.0 cm  LA/Ao: 1.3  LA Volume (BP): 34.4 ml  LA Volume Index (BP): 15.0 ml/m2  RWT: 0.59  TAPSE: 1.7 cm     Doppler Measurements & Calculations  MV E max hardik: 68.4 cm/sec  MV A max hardik: 99.5 cm/sec  MV E/A: 0.69  MV dec time: 0.13 sec     ______________________________________________________________________________  Report approved by: Dr Nupur Caraballo 02/15/2022 03:16 PM                 This patient has been assessed with a concern for Malnutrition and has been determined to have a diagnosis/diagnoses of Morbid obesity (BMI > 40).    This patient is being managed with:   Diet DASH/TLC-  Sodium & Cholesterol Restricted  Consistent Carbohydrate {Evening Snack} (CSTCHOSN)  Edward(7 Gm Arginine/7 Gm Glut/1.2 Gm HMB     Qty per Day:  1  Entered: Jan 8 2021  1:14PM

## 2022-05-18 ENCOUNTER — APPOINTMENT (OUTPATIENT)
Dept: INTERNAL MEDICINE | Facility: CLINIC | Age: 67
End: 2022-05-18
Payer: MEDICARE

## 2022-05-18 DIAGNOSIS — L97.509 TYPE 2 DIABETES MELLITUS WITH FOOT ULCER: ICD-10-CM

## 2022-05-18 DIAGNOSIS — I73.9 PERIPHERAL VASCULAR DISEASE, UNSPECIFIED: ICD-10-CM

## 2022-05-18 DIAGNOSIS — G89.29 OTHER CHRONIC PAIN: ICD-10-CM

## 2022-05-18 DIAGNOSIS — E11.621 TYPE 2 DIABETES MELLITUS WITH FOOT ULCER: ICD-10-CM

## 2022-05-18 DIAGNOSIS — C55 MALIGNANT NEOPLASM OF UTERUS, PART UNSPECIFIED: ICD-10-CM

## 2022-05-18 PROCEDURE — 99213 OFFICE O/P EST LOW 20 MIN: CPT | Mod: 95

## 2022-05-19 ENCOUNTER — APPOINTMENT (OUTPATIENT)
Dept: CT IMAGING | Facility: CLINIC | Age: 67
End: 2022-05-19

## 2022-05-19 PROBLEM — E11.621 DIABETIC FOOT ULCER ASSOCIATED WITH TYPE 2 DIABETES MELLITUS: Status: ACTIVE | Noted: 2020-08-12

## 2022-05-19 PROBLEM — I73.9 PAD (PERIPHERAL ARTERY DISEASE): Status: ACTIVE | Noted: 2018-05-07

## 2022-05-19 NOTE — H&P ADULT - NSHPLABSRESULTS_GEN_ALL_CORE
HAILEEMM to call back to schedule an appointment for around July 11th, 2 month follow up per Dr. Guzman.  
JEREMY to call and schedule 2 month follow up, around July 11th, with Dr. Guzman.  
No answer, unable to leave a message. Needs an appointment around the week of July 11th.  
Please schedule 2 months follow up- if possible , office .  
LABS:               12.5   10.06 )-----------( 155      ( 06 Jan 2021 15:10 )             40.6     01-06    141  |  102  |  29<H>  ----------------------------<  78  4.3   |  28  |  1.44<H>    Ca    9.6      06 Jan 2021 15:10    TPro  6.7  /  Alb  4.0  /  TBili  0.5  /  DBili  x   /  AST  11  /  ALT  14  /  AlkPhos  96  01-06     LIVER FUNCTIONS - ( 06 Jan 2021 15:10 )  Alb: 4.0 g/dL / Pro: 6.7 g/dL / ALK PHOS: 96 U/L / ALT: 14 U/L / AST: 11 U/L / GGT: x           PT/INR - ( 06 Jan 2021 16:41 )   PT: 12.2 sec;   INR: 1.02 ratio       PTT - ( 06 Jan 2021 16:41 )  PTT:26.9 sec    Lactate Trend    CAPILLARY BLOOD GLUCOSE    POCT Blood Glucose.: 88 mg/dL (06 Jan 2021 13:35)    RADIOLOGY & ADDITIONAL TESTS:   < from: Xray Foot AP + Lateral, Right (01.06.21 @ 14:05) >    EXAM:  FOOT 2VIEWS RT                          PROCEDURE DATE:  01/06/2021      INTERPRETATION:  CLINICAL INDICATION: Right heel/midfoot ulcer with cellulitis, question osteomyelitis    TECHNIQUE:  2 views of the right foot.    COMPARISON: Right foot x-ray from 11/9/2020    FINDINGS:    Diffuse soft tissue swelling.  Shallow ulceration at the plantar midfoot without evidence of subcutaneous tracking gas. There is underlying new cortical erosion at the base of the fifth metatarsal.  There are no acute displaced fractures or dislocations.  Redemonstration of mid foot Charcot arthropathy and heterotopic ossification in the region of the Achilles tendon.    IMPRESSION:  Shallow ulceration at the plantar midfoot.  No underlying cortical erosion at the base of the fifth metatarsal, concerning for osteomyelitis. If clinically warranted sectional imaging should be obtained.    ABDULLAHI KOWALSKI MD; Resident Radiology  This document has been electronically signed.  HENRY BROWN MD; Attending Radiologist  This document has been electronically signed. Jan 6 2021  3:58PM    < end of copied text >

## 2022-05-19 NOTE — PHYSICAL EXAM
[No Acute Distress] : no acute distress [Well Nourished] : well nourished [Well Developed] : well developed [Well-Appearing] : well-appearing [No Respiratory Distress] : no respiratory distress  [Speech Grossly Normal] : speech grossly normal [Normal Affect] : the affect was normal

## 2022-05-19 NOTE — ASSESSMENT
[FreeTextEntry1] : 1.  DM - stable at this time although she has no recent HgbA1C.  Has been following closely with podiatry regarding foot wounds.  Will d/w pt obtaining labs.\par 2.  CKD - creatinine improved on blood drawn last month by pulmonary.\par 3.  Chronic pain - advised to try tylenol for break thru pain as she has not done this\par 4.  H/O uterine cancer - no recent GYN f/u.  Will encourage\par 5.  R/O ENRIQUE - explained to patient that she is not sleeping well because of either sleep apnea or heart failure or [place].  She needs to have the sleep study done.  She is not purposely not sleeping.  I would like to see if her  O2 sats decrease tremendously while sleeping as she does have orthopnea and sounds like she has PND\par 6.  Heart failure - finally is seeing cardiology next month.  To continue with lasix for now.  She was on lisinopril but somewhere along the way it was stopped.  Will restart at a lower dose and watch creatinine closely.\par 7.  F/U pending above.

## 2022-05-19 NOTE — HISTORY OF PRESENT ILLNESS
[Home] : at home, [unfilled] , at the time of the visit. [Medical Office: ([facility])___] : at the medical office located in  [Verbal consent obtained from patient] : the patient, [unfilled] [de-identified] : Insurance turned down request for CT chest so she had to cancel the appointment\par \par Still feels SOB.  )2 levels are 90-91 with O2 and drop almost immediately if her oxygen is off.\par \par Sleep study didn't get done because she told them she only sleeps a few hours at a time \par \par Seeing Cardiology in June.  Seeing Dr. Winters from Heart Failure.  Sleeps on 5 pillows at night.  +PND.  Can't breathe if she falls asleep.  \par \par This morning sugar was 169 then it was 199.  No skin infections.  Has trouble cleaning her backside.  Something feels very sore there.  \par \par Is nervous all of the time, especially if she can't breathe.  \par \par Chronic pain is not always controlled as she is no longer taking Vicoden and I have decreased her oxycontin down to 60 mg bid.  Has not tried tylenol for break thru.

## 2022-05-27 DIAGNOSIS — H10.13 ACUTE ATOPIC CONJUNCTIVITIS, BILATERAL: ICD-10-CM

## 2022-05-27 RX ORDER — OLOPATADINE HYDROCHLORIDE 2 MG/ML
0.2 SOLUTION OPHTHALMIC DAILY
Qty: 1 | Refills: 2 | Status: ACTIVE | COMMUNITY
Start: 2022-05-27 | End: 1900-01-01

## 2022-06-02 ENCOUNTER — LABORATORY RESULT (OUTPATIENT)
Age: 67
End: 2022-06-02

## 2022-06-02 NOTE — H&P PST ADULT - PROBLEM/PLAN-3
Implemented All Universal Safety Interventions:  [place] to call system. Call bell, personal items and telephone within reach. Instruct patient to call for assistance. Room bathroom lighting operational. Non-slip footwear when patient is off stretcher. Physically safe environment: no spills, clutter or unnecessary equipment. Stretcher in lowest position, wheels locked, appropriate side rails in place. DISPLAY PLAN FREE TEXT

## 2022-06-07 ENCOUNTER — RX RENEWAL (OUTPATIENT)
Age: 67
End: 2022-06-07

## 2022-06-14 ENCOUNTER — LABORATORY RESULT (OUTPATIENT)
Age: 67
End: 2022-06-14

## 2022-07-08 ENCOUNTER — NON-APPOINTMENT (OUTPATIENT)
Age: 67
End: 2022-07-08

## 2022-07-08 DIAGNOSIS — B37.3 CANDIDIASIS OF VULVA AND VAGINA: ICD-10-CM

## 2022-07-08 NOTE — CHART NOTE - TREATMENT: THE FOLLOWING DIET HAS BEEN RECOMMENDED
Diet, Consistent Carbohydrate w/Evening Snack:   DASH/TLC {Sodium & Cholesterol Restricted} (DASH)  Edward(7 Gm Arginine/7 Gm Glut/1.2 Gm HMB     Qty per Day:  2 (11-12-20 @ 15:28) [Pending Verification By Attending]  Diet, Consistent Carbohydrate Renal w/Evening Snack (11-09-20 @ 23:10) [Active]  
Yes

## 2022-08-08 NOTE — ED CDU PROVIDER INITIAL DAY NOTE - WR ORDER NAME 3
IV removed. Reviewed discharge instructions with patient and wife. All questions answered at this time. Patient discharged walking, no DME needed, with wife and all belongings.   Xray Knee 3 Views, Right

## 2022-08-29 NOTE — PROGRESS NOTE ADULT - SUBJECTIVE AND OBJECTIVE BOX
Patient is a 65y old  Female who presents with a chief complaint of Right foot wound (13 Nov 2020 17:11)        SUBJECTIVE / OVERNIGHT EVENTS: Patient denies SOB or CP.       MEDICATIONS  (STANDING):  albuterol/ipratropium for Nebulization. 3 milliLiter(s) Nebulizer every 6 hours  ampicillin/sulbactam  IVPB 3 Gram(s) IV Intermittent every 8 hours  AQUAPHOR (petrolatum Ointment) 1 Application(s) Topical two times a day  aspirin enteric coated 81 milliGRAM(s) Oral daily  atorvastatin 80 milliGRAM(s) Oral at bedtime  chlorhexidine 2% Cloths 1 Application(s) Topical daily  collagenase Ointment 1 Application(s) Topical daily  dextrose 5%. 1000 milliLiter(s) (50 mL/Hr) IV Continuous <Continuous>  dextrose 50% Injectable 12.5 Gram(s) IV Push once  dextrose 50% Injectable 25 Gram(s) IV Push once  dextrose 50% Injectable 25 Gram(s) IV Push once  heparin   Injectable 7500 Unit(s) SubCutaneous every 8 hours  influenza   Vaccine 0.5 milliLiter(s) IntraMuscular once  insulin glargine Injectable (LANTUS) 80 Unit(s) SubCutaneous every morning  insulin glargine Injectable (LANTUS) 80 Unit(s) SubCutaneous at bedtime  insulin lispro (ADMELOG) corrective regimen sliding scale   SubCutaneous three times a day before meals  insulin lispro (ADMELOG) corrective regimen sliding scale   SubCutaneous at bedtime  insulin lispro Injectable (ADMELOG) 22 Unit(s) SubCutaneous three times a day before meals  multivitamin 1 Tablet(s) Oral daily  nystatin Powder 1 Application(s) Topical two times a day  oxyCODONE  ER Tablet 80 milliGRAM(s) Oral every 12 hours  polyethylene glycol 3350 17 Gram(s) Oral daily  senna 2 Tablet(s) Oral at bedtime    MEDICATIONS  (PRN):  clonazePAM  Tablet 0.5 milliGRAM(s) Oral at bedtime PRN ancxiety  dextrose 40% Gel 15 Gram(s) Oral once PRN Blood Glucose LESS THAN 70 milliGRAM(s)/deciliter  glucagon  Injectable 1 milliGRAM(s) IntraMuscular once PRN Glucose LESS THAN 70 milligrams/deciliter  oxyCODONE    IR 5 milliGRAM(s) Oral every 8 hours PRN Breakthrough Pain      Vital Signs Last 24 Hrs  T(C): 36.7 (14 Nov 2020 16:02), Max: 37.1 (14 Nov 2020 08:43)  T(F): 98 (14 Nov 2020 16:02), Max: 98.7 (14 Nov 2020 08:43)  HR: 77 (14 Nov 2020 16:02) (77 - 84)  BP: 138/75 (14 Nov 2020 16:02) (133/80 - 155/65)  BP(mean): --  RR: 18 (14 Nov 2020 16:02) (17 - 18)  SpO2: 100% (14 Nov 2020 16:02) (93% - 100%)  CAPILLARY BLOOD GLUCOSE      POCT Blood Glucose.: 190 mg/dL (14 Nov 2020 17:05)  POCT Blood Glucose.: 225 mg/dL (14 Nov 2020 12:21)  POCT Blood Glucose.: 176 mg/dL (14 Nov 2020 08:12)  POCT Blood Glucose.: 173 mg/dL (13 Nov 2020 21:43)  POCT Blood Glucose.: 187 mg/dL (13 Nov 2020 17:23)    I&O's Summary    13 Nov 2020 07:01  -  14 Nov 2020 07:00  --------------------------------------------------------  IN: 740 mL / OUT: 2600 mL / NET: -1860 mL    14 Nov 2020 07:01  -  14 Nov 2020 17:19  --------------------------------------------------------  IN: 640 mL / OUT: 900 mL / NET: -260 mL          PHYSICAL EXAM:   GENERAL: NAD, well-developed  HEAD:  Atraumatic, Normocephalic  EYES: conjunctiva and sclera clear  NECK: Supple   CHEST/LUNG: Clear to auscultation bilaterally; No wheeze  HEART: S1S2 normal. Regular rate and rhythm; No murmurs, rubs, or gallops  ABDOMEN: Soft, Nontender, Nondistended; Bowel sounds present  EXTREMITIES:  1-2+ LE edema  PSYCH/Neuro: AAOx3. Non-focal.   SKIN: Right foot bandaged. Bilateral distal LE skin changes.       LABS:                        13.1   9.56  )-----------( 225      ( 14 Nov 2020 06:42 )             40.9     11-14    137  |  99  |  32<H>  ----------------------------<  213<H>  4.5   |  26  |  1.77<H>    Ca    9.7      14 Nov 2020 06:42        x< from: Xray Chest 1 View- PORTABLE-Urgent (Xray Chest 1 View- PORTABLE-Urgent .) (11.13.20 @ 16:44) >  IMPRESSION: Clear lungs.    < end of copied text >      RADIOLOGY & ADDITIONAL TESTS:    Imaging Personally Reviewed: CXR report.   Consultant(s) Notes Reviewed:  Renal and endocrine  Care Discussed with Consultants/Other Providers:   [Follow-Up: _____] : a [unfilled] follow-up visit

## 2022-09-07 ENCOUNTER — APPOINTMENT (OUTPATIENT)
Dept: HEART FAILURE | Facility: CLINIC | Age: 67
End: 2022-09-07

## 2022-09-14 ENCOUNTER — RX RENEWAL (OUTPATIENT)
Age: 67
End: 2022-09-14

## 2022-09-19 NOTE — H&P ADULT - NSHPATTENDINGPLANDISCUSS_GEN_ALL_CORE
RN cannot approve Refill Request    RN can NOT refill this medication overdue for office visits and/or labs.    Elpidio Canseco, [facility] Triage/Med Refill 5/2/2019    Requested Prescriptions   Pending Prescriptions Disp Refills     losartan (COZAAR) 100 MG tablet [Pharmacy Med Name: LOSARTAN POTASSIUM 100 MG TAB] 90 tablet 1     Sig: TAKE 1 TABLET BY MOUTH EVERY DAY       Angiotensin Receptor Blocker Protocol Failed - 5/2/2019  2:00 AM        Failed - PCP or prescribing provider visit in past 12 months       Last office visit with prescriber/PCP: 3/20/2018 Kamaljit Plata MD OR same dept: Visit date not found OR same specialty: 3/20/2018 Kamaljit Plata MD  Last physical: 10/23/2017 Last MTM visit: Visit date not found   Next visit within 3 mo: Visit date not found  Next physical within 3 mo: Visit date not found  Prescriber OR PCP: Kamaljit Plata MD  Last diagnosis associated with med order: 1. Essential hypertension with goal blood pressure less than 140/90  - losartan (COZAAR) 100 MG tablet [Pharmacy Med Name: LOSARTAN POTASSIUM 100 MG TAB]; TAKE 1 TABLET BY MOUTH EVERY DAY  Dispense: 90 tablet; Refill: 1    If protocol passes may refill for 12 months if within 3 months of last provider visit (or a total of 15 months).             Passed - Serum potassium within the past 12 months     Lab Results   Component Value Date    Potassium 4.2 08/22/2018             Passed - Blood pressure filed in past 12 months     BP Readings from Last 1 Encounters:   04/19/19 134/74             Passed - Serum creatinine within the past 12 months     Creatinine   Date Value Ref Range Status   08/22/2018 0.88 0.70 - 1.30 mg/dL Final                 yes Carin Recio MD

## 2022-09-21 ENCOUNTER — APPOINTMENT (OUTPATIENT)
Dept: HEART FAILURE | Facility: CLINIC | Age: 67
End: 2022-09-21

## 2022-10-03 ENCOUNTER — NON-APPOINTMENT (OUTPATIENT)
Age: 67
End: 2022-10-03

## 2022-10-10 NOTE — PHYSICAL THERAPY INITIAL EVALUATION ADULT - ADL SKILLS, REHAB EVAL
Gastroenterology Consultation    Identification: Tj Duarte, 50 year old, male, [id number]  Source of History: Patient and EMR. Reliable.  Consulting Provider: Matthew Humphrey MD  Reason for Consultation: Pancreatitis    Subjective     Subjective    Patient seen and examined, overnight patient was agitated and in restraints. Patient reports he is still not experiencing any abdominal pain.      Current Facility-Administered Medications   Medication   • gabapentin (NEURONTIN) capsule 300 mg   • pantoprazole (PROTONIX) EC tablet 40 mg   • sodium chloride 0.9 % flush bag 25 mL   • sodium chloride (PF) 0.9 % injection 2 mL   • sodium chloride (NORMAL SALINE) 0.9 % bolus 500 mL   • enoxaparin (LOVENOX) injection 40 mg   • PHENobarbital (LUMINAL) 65 MG/ML injection 65 mg   • LORazepam (ATIVAN) tablet 2 mg    Or   • LORazepam (ATIVAN) injection 2 mg   • ondansetron (ZOFRAN ODT) disintegrating tablet 4 mg    Or   • ondansetron (ZOFRAN) injection 4 mg   • acetaminophen (TYLENOL) tablet 650 mg   • morphine injection 2 mg   • HYDROcodone-acetaminophen (NORCO) 5-325 MG per tablet 1 tablet       Objective     Physical Examination:  BP (!) 142/94   Pulse (!) 106   Temp 97.9 °F (36.6 °C) (Oral)   Resp 16   Ht 5' 6.93\" (1.7 m)   Wt 69 kg (152 lb 1.9 oz)   BMI 23.88 kg/m²   BSA 1.8 m²   General: No acute distress.  HENT: Oropharynx is clear without erythema or exudates. Abrasion on left forehead/cheek.  Eyes: Pupils are equal, round, and reactive to light. No scleral icterus.  Cardiovascular: Normal rate and regular rhythm. Normal pulses. Normal heart sounds.   Pulmonary: Normal effort. Normal breath sounds.  Abdomen: Flat. Normal bowel sounds. Soft and non-tender. No guarding or rigidity. No palpable masses or organomegaly.  Musculoskeletal: No lower extremity edema.  Lymphadenopathy: No cervical adenopathy.  Skin: Warm and dry.  Neurological: Drowsy and lethargic this morning    Other specialty notes, laboratory  tests, diagnostic imaging, PTA and current medications, intake and output, and active orders were personally reviewed.    Medical Decision Making     Summary: Tj Duarte is a 50 year old male with a PMHx of alcohol use disorder that presented to [facility] for detox that we are being consulted for possible pancreatitis.    Problem List:  #Elevated Lipase with no evidence of pancreatitis  #Elevated LFTs  #Alcohol withdrawal  #Alcohol use disorder    Recommendations:  - Patient okay for regular diet  -ALT > AST which is not typical of pure alcoholic liver disease  -Ceruloplasmin, anti smooth muscles antibody, AMA and LACI are negative  -Suspicion of pancreatitis is low given patient is not experiencing any abdominal discomfort  -CT abdomen and pelvis with contrast reviewed, no significant findings discovered  -Hepatitis B surface antibody, surface antigen and core antibody negative  -With Hepatitis B surface antibody negative, Patient should receive hepatitis B immunization outpatient  -Elevated ferritin, will order HFE gene to further evaluate  -OK to monitor LFTs every other day  -GI will sign off, thank you for allowing us to participate in patients care, please call if any additional questions    Discussed & evaluated patient with Dr. Tian Brothers DO  Internal Medicine Resident PGY-3       independent

## 2022-10-11 ENCOUNTER — RX RENEWAL (OUTPATIENT)
Age: 67
End: 2022-10-11

## 2022-10-12 ENCOUNTER — INPATIENT (INPATIENT)
Facility: HOSPITAL | Age: 67
LOS: 13 days | Discharge: NURSING, REHAB OR LONG-TERM CARE | DRG: 291 | End: 2022-10-26
Attending: STUDENT IN AN ORGANIZED HEALTH CARE EDUCATION/TRAINING PROGRAM | Admitting: STUDENT IN AN ORGANIZED HEALTH CARE EDUCATION/TRAINING PROGRAM
Payer: MEDICARE

## 2022-10-12 ENCOUNTER — APPOINTMENT (OUTPATIENT)
Dept: INTERNAL MEDICINE | Facility: CLINIC | Age: 67
End: 2022-10-12

## 2022-10-12 VITALS — OXYGEN SATURATION: 91 % | SYSTOLIC BLOOD PRESSURE: 128 MMHG | HEART RATE: 97 BPM | DIASTOLIC BLOOD PRESSURE: 78 MMHG

## 2022-10-12 VITALS
WEIGHT: 293 LBS | RESPIRATION RATE: 20 BRPM | SYSTOLIC BLOOD PRESSURE: 142 MMHG | DIASTOLIC BLOOD PRESSURE: 81 MMHG | OXYGEN SATURATION: 96 % | HEART RATE: 84 BPM | HEIGHT: 63 IN | TEMPERATURE: 98 F

## 2022-10-12 DIAGNOSIS — R09.02 HYPOXEMIA: ICD-10-CM

## 2022-10-12 DIAGNOSIS — Z90.49 ACQUIRED ABSENCE OF OTHER SPECIFIED PARTS OF DIGESTIVE TRACT: Chronic | ICD-10-CM

## 2022-10-12 DIAGNOSIS — J96.21 ACUTE AND CHRONIC RESPIRATORY FAILURE WITH HYPOXIA: ICD-10-CM

## 2022-10-12 DIAGNOSIS — Z29.9 ENCOUNTER FOR PROPHYLACTIC MEASURES, UNSPECIFIED: ICD-10-CM

## 2022-10-12 DIAGNOSIS — R60.0 LOCALIZED EDEMA: ICD-10-CM

## 2022-10-12 DIAGNOSIS — I27.20 PULMONARY HYPERTENSION, UNSPECIFIED: ICD-10-CM

## 2022-10-12 DIAGNOSIS — Z98.890 OTHER SPECIFIED POSTPROCEDURAL STATES: Chronic | ICD-10-CM

## 2022-10-12 DIAGNOSIS — L30.4 ERYTHEMA INTERTRIGO: ICD-10-CM

## 2022-10-12 DIAGNOSIS — Z90.710 ACQUIRED ABSENCE OF BOTH CERVIX AND UTERUS: Chronic | ICD-10-CM

## 2022-10-12 DIAGNOSIS — I50.33 ACUTE ON CHRONIC DIASTOLIC (CONGESTIVE) HEART FAILURE: ICD-10-CM

## 2022-10-12 DIAGNOSIS — R65.10 SYSTEMIC INFLAMMATORY RESPONSE SYNDROME (SIRS) OF NON-INFECTIOUS ORIGIN WITHOUT ACUTE ORGAN DYSFUNCTION: ICD-10-CM

## 2022-10-12 DIAGNOSIS — E87.70 FLUID OVERLOAD, UNSPECIFIED: ICD-10-CM

## 2022-10-12 DIAGNOSIS — N18.30 CHRONIC KIDNEY DISEASE, STAGE 3 UNSPECIFIED: ICD-10-CM

## 2022-10-12 DIAGNOSIS — I50.9 HEART FAILURE, UNSPECIFIED: ICD-10-CM

## 2022-10-12 DIAGNOSIS — E11.9 TYPE 2 DIABETES MELLITUS WITHOUT COMPLICATIONS: ICD-10-CM

## 2022-10-12 DIAGNOSIS — I10 ESSENTIAL (PRIMARY) HYPERTENSION: ICD-10-CM

## 2022-10-12 LAB
ALBUMIN SERPL ELPH-MCNC: 3.8 G/DL — SIGNIFICANT CHANGE UP (ref 3.3–5)
ALP SERPL-CCNC: 138 U/L — HIGH (ref 40–120)
ALT FLD-CCNC: 16 U/L — SIGNIFICANT CHANGE UP (ref 10–45)
ANION GAP SERPL CALC-SCNC: 18 MMOL/L — HIGH (ref 5–17)
APPEARANCE UR: CLEAR — SIGNIFICANT CHANGE UP
APTT BLD: 26.1 SEC — LOW (ref 27.5–35.5)
AST SERPL-CCNC: 19 U/L — SIGNIFICANT CHANGE UP (ref 10–40)
BACTERIA # UR AUTO: NEGATIVE — SIGNIFICANT CHANGE UP
BASE EXCESS BLDV CALC-SCNC: 9.4 MMOL/L — HIGH (ref -2–3)
BASOPHILS # BLD AUTO: 0.1 K/UL — SIGNIFICANT CHANGE UP (ref 0–0.2)
BASOPHILS NFR BLD AUTO: 0.8 % — SIGNIFICANT CHANGE UP (ref 0–2)
BILIRUB SERPL-MCNC: 0.7 MG/DL — SIGNIFICANT CHANGE UP (ref 0.2–1.2)
BILIRUB UR-MCNC: NEGATIVE — SIGNIFICANT CHANGE UP
BUN SERPL-MCNC: 34 MG/DL — HIGH (ref 7–23)
CA-I BLD-SCNC: 1.1 MMOL/L — LOW (ref 1.15–1.33)
CA-I SERPL-SCNC: 1.19 MMOL/L — SIGNIFICANT CHANGE UP (ref 1.15–1.33)
CALCIUM SERPL-MCNC: 9.3 MG/DL — SIGNIFICANT CHANGE UP (ref 8.4–10.5)
CHLORIDE BLDV-SCNC: 100 MMOL/L — SIGNIFICANT CHANGE UP (ref 96–108)
CHLORIDE SERPL-SCNC: 101 MMOL/L — SIGNIFICANT CHANGE UP (ref 96–108)
CO2 BLDV-SCNC: 39 MMOL/L — HIGH (ref 22–26)
CO2 SERPL-SCNC: 21 MMOL/L — LOW (ref 22–31)
COLOR SPEC: YELLOW — SIGNIFICANT CHANGE UP
CREAT SERPL-MCNC: 1.55 MG/DL — HIGH (ref 0.5–1.3)
DIFF PNL FLD: NEGATIVE — SIGNIFICANT CHANGE UP
EGFR: 36 ML/MIN/1.73M2 — LOW
EOSINOPHIL # BLD AUTO: 0.32 K/UL — SIGNIFICANT CHANGE UP (ref 0–0.5)
EOSINOPHIL NFR BLD AUTO: 2.7 % — SIGNIFICANT CHANGE UP (ref 0–6)
EPI CELLS # UR: 1 /HPF — SIGNIFICANT CHANGE UP
GAS PNL BLDV: 139 MMOL/L — SIGNIFICANT CHANGE UP (ref 136–145)
GAS PNL BLDV: SIGNIFICANT CHANGE UP
GLUCOSE BLDV-MCNC: 87 MG/DL — SIGNIFICANT CHANGE UP (ref 70–99)
GLUCOSE SERPL-MCNC: 103 MG/DL — HIGH (ref 70–99)
GLUCOSE UR QL: NEGATIVE — SIGNIFICANT CHANGE UP
HBA1C MFR BLD HPLC: 6.9
HCO3 BLDV-SCNC: 37 MMOL/L — HIGH (ref 22–29)
HCT VFR BLD CALC: 41.6 % — SIGNIFICANT CHANGE UP (ref 34.5–45)
HCT VFR BLDA CALC: 38 % — SIGNIFICANT CHANGE UP (ref 34.5–46.5)
HGB BLD CALC-MCNC: 12.6 G/DL — SIGNIFICANT CHANGE UP (ref 11.7–16.1)
HGB BLD-MCNC: 12.3 G/DL — SIGNIFICANT CHANGE UP (ref 11.5–15.5)
HYALINE CASTS # UR AUTO: 1 /LPF — SIGNIFICANT CHANGE UP (ref 0–2)
IMM GRANULOCYTES NFR BLD AUTO: 1.4 % — HIGH (ref 0–0.9)
INR BLD: 1.09 RATIO — SIGNIFICANT CHANGE UP (ref 0.88–1.16)
KETONES UR-MCNC: NEGATIVE — SIGNIFICANT CHANGE UP
LACTATE BLDV-MCNC: 1.1 MMOL/L — SIGNIFICANT CHANGE UP (ref 0.5–2)
LEUKOCYTE ESTERASE UR-ACNC: NEGATIVE — SIGNIFICANT CHANGE UP
LYMPHOCYTES # BLD AUTO: 1.36 K/UL — SIGNIFICANT CHANGE UP (ref 1–3.3)
LYMPHOCYTES # BLD AUTO: 11.6 % — LOW (ref 13–44)
MAGNESIUM SERPL-MCNC: 2 MG/DL — SIGNIFICANT CHANGE UP (ref 1.6–2.6)
MCHC RBC-ENTMCNC: 27.6 PG — SIGNIFICANT CHANGE UP (ref 27–34)
MCHC RBC-ENTMCNC: 29.6 GM/DL — LOW (ref 32–36)
MCV RBC AUTO: 93.3 FL — SIGNIFICANT CHANGE UP (ref 80–100)
MONOCYTES # BLD AUTO: 0.91 K/UL — HIGH (ref 0–0.9)
MONOCYTES NFR BLD AUTO: 7.7 % — SIGNIFICANT CHANGE UP (ref 2–14)
NEUTROPHILS # BLD AUTO: 8.92 K/UL — HIGH (ref 1.8–7.4)
NEUTROPHILS NFR BLD AUTO: 75.8 % — SIGNIFICANT CHANGE UP (ref 43–77)
NITRITE UR-MCNC: NEGATIVE — SIGNIFICANT CHANGE UP
NRBC # BLD: 0 /100 WBCS — SIGNIFICANT CHANGE UP (ref 0–0)
NT-PROBNP SERPL-SCNC: 1174 PG/ML — HIGH (ref 0–300)
PCO2 BLDV: 66 MMHG — HIGH (ref 39–42)
PH BLDV: 7.36 — SIGNIFICANT CHANGE UP (ref 7.32–7.43)
PH UR: 5.5 — SIGNIFICANT CHANGE UP (ref 5–8)
PHOSPHATE SERPL-MCNC: 3.3 MG/DL — SIGNIFICANT CHANGE UP (ref 2.5–4.5)
PLATELET # BLD AUTO: 253 K/UL — SIGNIFICANT CHANGE UP (ref 150–400)
PO2 BLDV: 43 MMHG — SIGNIFICANT CHANGE UP (ref 25–45)
POTASSIUM BLDV-SCNC: 4.4 MMOL/L — SIGNIFICANT CHANGE UP (ref 3.5–5.1)
POTASSIUM SERPL-MCNC: 5.7 MMOL/L — HIGH (ref 3.5–5.3)
POTASSIUM SERPL-SCNC: 5.7 MMOL/L — HIGH (ref 3.5–5.3)
PROCALCITONIN SERPL-MCNC: 0.08 NG/ML — SIGNIFICANT CHANGE UP (ref 0.02–0.1)
PROT SERPL-MCNC: 7.1 G/DL — SIGNIFICANT CHANGE UP (ref 6–8.3)
PROT UR-MCNC: ABNORMAL
PROTHROM AB SERPL-ACNC: 12.5 SEC — SIGNIFICANT CHANGE UP (ref 10.5–13.4)
RAPID RVP RESULT: SIGNIFICANT CHANGE UP
RBC # BLD: 4.46 M/UL — SIGNIFICANT CHANGE UP (ref 3.8–5.2)
RBC # FLD: 15.6 % — HIGH (ref 10.3–14.5)
RBC CASTS # UR COMP ASSIST: 5 /HPF — HIGH (ref 0–4)
SAO2 % BLDV: 71.2 % — SIGNIFICANT CHANGE UP (ref 67–88)
SARS-COV-2 RNA SPEC QL NAA+PROBE: SIGNIFICANT CHANGE UP
SODIUM SERPL-SCNC: 140 MMOL/L — SIGNIFICANT CHANGE UP (ref 135–145)
SP GR SPEC: 1.01 — SIGNIFICANT CHANGE UP (ref 1.01–1.02)
TROPONIN T, HIGH SENSITIVITY RESULT: 48 NG/L — SIGNIFICANT CHANGE UP (ref 0–51)
UROBILINOGEN FLD QL: NEGATIVE — SIGNIFICANT CHANGE UP
WBC # BLD: 11.77 K/UL — HIGH (ref 3.8–10.5)
WBC # FLD AUTO: 11.77 K/UL — HIGH (ref 3.8–10.5)
WBC UR QL: 1 /HPF — SIGNIFICANT CHANGE UP (ref 0–5)

## 2022-10-12 PROCEDURE — 99223 1ST HOSP IP/OBS HIGH 75: CPT | Mod: GC

## 2022-10-12 PROCEDURE — 99291 CRITICAL CARE FIRST HOUR: CPT

## 2022-10-12 PROCEDURE — 99214 OFFICE O/P EST MOD 30 MIN: CPT | Mod: 25

## 2022-10-12 PROCEDURE — 83036 HEMOGLOBIN GLYCOSYLATED A1C: CPT | Mod: QW

## 2022-10-12 PROCEDURE — 71045 X-RAY EXAM CHEST 1 VIEW: CPT | Mod: 26

## 2022-10-12 RX ORDER — NYSTATIN CREAM 100000 [USP'U]/G
1 CREAM TOPICAL THREE TIMES A DAY
Refills: 0 | Status: DISCONTINUED | OUTPATIENT
Start: 2022-10-12 | End: 2022-10-24

## 2022-10-12 RX ORDER — ASPIRIN/CALCIUM CARB/MAGNESIUM 324 MG
81 TABLET ORAL DAILY
Refills: 0 | Status: DISCONTINUED | OUTPATIENT
Start: 2022-10-12 | End: 2022-10-26

## 2022-10-12 RX ORDER — HYDROMORPHONE HYDROCHLORIDE 2 MG/ML
1 INJECTION INTRAMUSCULAR; INTRAVENOUS; SUBCUTANEOUS ONCE
Refills: 0 | Status: DISCONTINUED | OUTPATIENT
Start: 2022-10-12 | End: 2022-10-13

## 2022-10-12 RX ORDER — FUROSEMIDE 40 MG
80 TABLET ORAL ONCE
Refills: 0 | Status: COMPLETED | OUTPATIENT
Start: 2022-10-12 | End: 2022-10-12

## 2022-10-12 RX ORDER — INSULIN DETEMIR 100/ML (3)
64 INSULIN PEN (ML) SUBCUTANEOUS
Qty: 0 | Refills: 0 | DISCHARGE

## 2022-10-12 RX ORDER — INSULIN LISPRO 100/ML
VIAL (ML) SUBCUTANEOUS AT BEDTIME
Refills: 0 | Status: DISCONTINUED | OUTPATIENT
Start: 2022-10-12 | End: 2022-10-22

## 2022-10-12 RX ORDER — ACETAMINOPHEN 500 MG
650 TABLET ORAL EVERY 6 HOURS
Refills: 0 | Status: DISCONTINUED | OUTPATIENT
Start: 2022-10-12 | End: 2022-10-26

## 2022-10-12 RX ORDER — CLONAZEPAM 1 MG
1 TABLET ORAL
Qty: 0 | Refills: 0 | DISCHARGE

## 2022-10-12 RX ORDER — DEXTROSE 50 % IN WATER 50 %
15 SYRINGE (ML) INTRAVENOUS ONCE
Refills: 0 | Status: DISCONTINUED | OUTPATIENT
Start: 2022-10-12 | End: 2022-10-22

## 2022-10-12 RX ORDER — DEXTROSE 50 % IN WATER 50 %
25 SYRINGE (ML) INTRAVENOUS ONCE
Refills: 0 | Status: DISCONTINUED | OUTPATIENT
Start: 2022-10-12 | End: 2022-10-22

## 2022-10-12 RX ORDER — HEPARIN SODIUM 5000 [USP'U]/ML
5000 INJECTION INTRAVENOUS; SUBCUTANEOUS EVERY 8 HOURS
Refills: 0 | Status: DISCONTINUED | OUTPATIENT
Start: 2022-10-12 | End: 2022-10-26

## 2022-10-12 RX ORDER — INSULIN GLARGINE 100 [IU]/ML
84 INJECTION, SOLUTION SUBCUTANEOUS AT BEDTIME
Refills: 0 | Status: DISCONTINUED | OUTPATIENT
Start: 2022-10-12 | End: 2022-10-12

## 2022-10-12 RX ORDER — INSULIN ASPART 100 [IU]/ML
12 INJECTION, SOLUTION SUBCUTANEOUS
Qty: 0 | Refills: 0 | DISCHARGE

## 2022-10-12 RX ORDER — INSULIN GLARGINE 100 [IU]/ML
50 INJECTION, SOLUTION SUBCUTANEOUS AT BEDTIME
Refills: 0 | Status: DISCONTINUED | OUTPATIENT
Start: 2022-10-12 | End: 2022-10-13

## 2022-10-12 RX ORDER — OXYCODONE HYDROCHLORIDE 5 MG/1
1 TABLET ORAL
Qty: 0 | Refills: 0 | DISCHARGE

## 2022-10-12 RX ORDER — POLYETHYLENE GLYCOL 3350 17 G/17G
17 POWDER, FOR SOLUTION ORAL
Refills: 0 | Status: DISCONTINUED | OUTPATIENT
Start: 2022-10-12 | End: 2022-10-13

## 2022-10-12 RX ORDER — OXYCODONE HYDROCHLORIDE 5 MG/1
60 TABLET ORAL ONCE
Refills: 0 | Status: DISCONTINUED | OUTPATIENT
Start: 2022-10-12 | End: 2022-10-12

## 2022-10-12 RX ORDER — INSULIN LISPRO 100/ML
20 VIAL (ML) SUBCUTANEOUS
Refills: 0 | Status: DISCONTINUED | OUTPATIENT
Start: 2022-10-12 | End: 2022-10-12

## 2022-10-12 RX ORDER — DEXTROSE 50 % IN WATER 50 %
12.5 SYRINGE (ML) INTRAVENOUS ONCE
Refills: 0 | Status: DISCONTINUED | OUTPATIENT
Start: 2022-10-12 | End: 2022-10-22

## 2022-10-12 RX ORDER — GLUCAGON INJECTION, SOLUTION 0.5 MG/.1ML
1 INJECTION, SOLUTION SUBCUTANEOUS ONCE
Refills: 0 | Status: DISCONTINUED | OUTPATIENT
Start: 2022-10-12 | End: 2022-10-22

## 2022-10-12 RX ORDER — ATORVASTATIN CALCIUM 80 MG/1
80 TABLET, FILM COATED ORAL AT BEDTIME
Refills: 0 | Status: DISCONTINUED | OUTPATIENT
Start: 2022-10-12 | End: 2022-10-26

## 2022-10-12 RX ORDER — OXYCODONE HYDROCHLORIDE 5 MG/1
60 TABLET ORAL EVERY 12 HOURS
Refills: 0 | Status: DISCONTINUED | OUTPATIENT
Start: 2022-10-13 | End: 2022-10-20

## 2022-10-12 RX ORDER — IPRATROPIUM/ALBUTEROL SULFATE 18-103MCG
3 AEROSOL WITH ADAPTER (GRAM) INHALATION EVERY 6 HOURS
Refills: 0 | Status: DISCONTINUED | OUTPATIENT
Start: 2022-10-12 | End: 2022-10-26

## 2022-10-12 RX ORDER — SODIUM ZIRCONIUM CYCLOSILICATE 10 G/10G
10 POWDER, FOR SUSPENSION ORAL ONCE
Refills: 0 | Status: COMPLETED | OUTPATIENT
Start: 2022-10-12 | End: 2022-10-12

## 2022-10-12 RX ORDER — ONDANSETRON 8 MG/1
4 TABLET, FILM COATED ORAL EVERY 8 HOURS
Refills: 0 | Status: DISCONTINUED | OUTPATIENT
Start: 2022-10-12 | End: 2022-10-26

## 2022-10-12 RX ORDER — INSULIN LISPRO 100/ML
VIAL (ML) SUBCUTANEOUS
Refills: 0 | Status: DISCONTINUED | OUTPATIENT
Start: 2022-10-12 | End: 2022-10-22

## 2022-10-12 RX ADMIN — SODIUM ZIRCONIUM CYCLOSILICATE 10 GRAM(S): 10 POWDER, FOR SUSPENSION ORAL at 21:21

## 2022-10-12 RX ADMIN — Medication 80 MILLIGRAM(S): at 19:25

## 2022-10-12 RX ADMIN — OXYCODONE HYDROCHLORIDE 60 MILLIGRAM(S): 5 TABLET ORAL at 19:16

## 2022-10-12 NOTE — ED ADULT NURSE NOTE - OBJECTIVE STATEMENT
66 yo female PMH HTN, DM, HLD, spinal stenosis, A&Ox3, BIBEMS from PCP office for dyspnea on exertion.  pt reports went to PCP today and PCP wants her to be seen in  ED for worsening HF exacerbation.  PT Is on 4-6lpm of O2 on NC at home.  PT says when she walked she feels chest pressure and short of breath when in bed. Breathing even and unlabored at rest, abdomen soft but hardened, decreased mobility, no pedal edema.  Pt denies chest pain, palpitations,  headache, visual disturbances, numbness/tingling, fever, chills, diaphoresis,  nausea, vomiting, constipation, diarrhea, or urinary symptoms.  Pt placed on hospital bed for comfort, side rails up, bed in lowest position , call bell within reach.

## 2022-10-12 NOTE — H&P ADULT - NSHPREVIEWOFSYSTEMS_GEN_ALL_CORE
REVIEW OF SYSTEMS:  CONSTITUTIONAL: No fever, chills, night sweats, or fatigue  EYES: No eye pain, visual disturbances, or discharge  ENMT:  No difficulty hearing, tinnitus, vertigo; No sinus or throat pain  NECK: No pain or stiffness  RESPIRATORY: No cough, wheezing, or hemoptysis; No shortness of breath  CARDIOVASCULAR: No chest pain, palpitations, dizziness, or leg swelling  GASTROINTESTINAL: No abdominal or epigastric pain. No nausea, vomiting, or hematemesis; No diarrhea or constipation. No melena or hematochezia.  GENITOURINARY: No dysuria, frequency, hematuria, or incontinence  NEUROLOGICAL: No headaches, memory loss, loss of strength, numbness, or tremors  SKIN: No itching, burning, rashes, or lesions   LYMPH NODES: No enlarged glands  ENDOCRINE: No heat or cold intolerance; No hair loss  MUSCULOSKELETAL: No joint pain or swelling; No muscle, back, or extremity pain  PSYCHIATRIC: No depression, anxiety, mood swings, or difficulty sleeping  HEME/LYMPH: No easy bruising, or bleeding gums  ALLERGY AND IMMUNOLOGIC: No hives or eczema REVIEW OF SYSTEMS:  CONSTITUTIONAL: No fever, +chills  ENMT:  No vertigo; No sinus or throat pain  RESPIRATORY: No cough or hemoptysis; +wheezing, shortness of breath, LEGER  CARDIOVASCULAR: +chest pain, dizziness, b/l leg swelling. No palpitations.  GASTROINTESTINAL: No abdominal or epigastric pain. +nausea, no vomiting, or hematemesis; No diarrhea or constipation. No melena or hematochezia.  GENITOURINARY: No dysuria, frequency  NEUROLOGICAL: No headaches, memory loss, loss of strength, numbness, or tremors  SKIN: +itching, rashes, LE lesions   LYMPH NODES: No enlarged glands  MUSCULOSKELETAL: +back pain  PSYCHIATRIC: +difficulty sleeping

## 2022-10-12 NOTE — PHYSICAL EXAM
[Normal Sclera/Conjunctiva] : normal sclera/conjunctiva [EOMI] : extraocular movements intact [de-identified] : appears ill, tachypneic in moderate distress [de-identified] : poor effort, unable to appreciate BS [de-identified] : tachycardic, +S1S2 [de-identified] : massive edema b/l with total body anasarca

## 2022-10-12 NOTE — ED PROVIDER NOTE - ATTENDING CONTRIBUTION TO CARE
Pt with gradual onset over weeks of worsening abdominal distension associated with dyspnea.  +UOP, +frequency, +chills, no chest pain, +dyspnea (chronic), no cough, no vomiting, +BMs.  PT appears mildly dyspneic, nontoxic appearing, diminished bs at bases, +3 pitting edema of abdominal wall, +2 of LE b/l, pulses intact. Nontender abdomen.

## 2022-10-12 NOTE — H&P ADULT - ASSESSMENT
66F with PMH morbid obesity, HTN, HLD, DM, CKD3, HFpEF on 4-6L NC oxygen at baseline (not on BiPAP/CPAP qhs), presenting with SOB/LEGER c/f HF exacerbation. 66F with PMH morbid obesity, moderate persistant asthma, HTN, HLD, DM, CKD3, HFpEF on 4-6L NC oxygen at baseline (not on BiPAP/CPAP qhs), presenting with SOB/LEGER c/f HF exacerbation vs. asthma exacerbation vs. PE vs. exacerbation of pHTN

## 2022-10-12 NOTE — HISTORY OF PRESENT ILLNESS
[de-identified] : 67-year-old female with multiple medical problems including diabetes, heart failure with preserved ejection fraction, CKD 3B, endometrial cancer s/p MEGAN/BSO but poor follow-up afterwards, morbid obesity last seen in the office physically in February here after multiple attempts to get her into the office with complaints of shortness of breath and massive edema.  Patient states she is not feeling well and is very short of breath with minimal activity.  She has been noticing the massive swelling for some time now.  According to her son its about 4 weeks despite taking furosemide 40 mg daily.  She is urinating every hour to an hour and a half, sometimes small amounts and sometimes normal.  Now on 4 L of oxygen and tachypneic.  Complaining of rash in all of her intertriginous areas that is itchy and malodorous.  She is finding it increasingly difficult to take care of herself.

## 2022-10-12 NOTE — H&P ADULT - NSHPLABSRESULTS_GEN_ALL_CORE
LABS:                12.3   11.77 )-----------( 253      ( 12 Oct 2022 18:17 )             41.6     10-12    140  |  101  |  34<H>  ----------------------------<  103<H>  5.7<H>   |  21<L>  |  1.55<H>    Ca    9.3      12 Oct 2022 18:17  Phos  3.3     10-12  Mg     2.0     10-12    TPro  7.1  /  Alb  3.8  /  TBili  0.7  /  DBili  x   /  AST  19  /  ALT  16  /  AlkPhos  138<H>  10-12        PT/INR - ( 12 Oct 2022 18:17 )   PT: 12.5 sec;   INR: 1.09 ratio      PTT - ( 12 Oct 2022 18:17 )  PTT:26.1 sec    LIVER FUNCTIONS - ( 12 Oct 2022 18:17 )  Alb: 3.8 g/dL / Pro: 7.1 g/dL / ALK PHOS: 138 U/L / ALT: 16 U/L / AST: 19 U/L / GGT: x           Blood Gas Profile w/Lytes - Venous: Performed In Lab (10-12-22 @ 18:17)  Blood Gas Profile w/Lytes - Venous: Performed In Lab (10-12-22 @ 18:17)    I&O's Summary     / Troponin T, High Sensitivity Result: 48 ng/L (10-12-22 @ 18:17)      RADIOLOGY:    < from: Xray Chest 1 View- PORTABLE-Urgent (Xray Chest 1 View- PORTABLE-Urgent .) (10.12.22 @ 18:55) >  INTERPRETATION:  clear lungs        ******PRELIMINARY REPORT******      < end of copied text >

## 2022-10-12 NOTE — ED PROVIDER NOTE - CLINICAL SUMMARY MEDICAL DECISION MAKING FREE TEXT BOX
Dr. Mabry Note: worsening peripheral edema causing abdominal wall edema with acute on chronic dyspnea, labs eval for ACS vs infectious vs metabolic vs medication/diet indiscretion causes for HF exacerbation, pt estimated with 10+lbs fluid overload, will require inpatient treatment, not outpatient candidate.

## 2022-10-12 NOTE — ED ADULT NURSE NOTE - NSIMPLEMENTINTERV_GEN_ALL_ED
Implemented All Fall Risk Interventions:  [place] to call system. Call bell, personal items and telephone within reach. Instruct patient to call for assistance. Room bathroom lighting operational. Non-slip footwear when patient is off stretcher. Physically safe environment: no spills, clutter or unnecessary equipment. Stretcher in lowest position, wheels locked, appropriate side rails in place. Provide visual cue, wrist band, yellow gown, etc. Monitor gait and stability. Monitor for mental status changes and reorient to person, place, and time. Review medications for side effects contributing to fall risk. Reinforce activity limits and safety measures with patient and family.

## 2022-10-12 NOTE — ASSESSMENT
[FreeTextEntry1] : 67-year-old female who presents to the office today for follow-up with massive edema and total body anasarca.  Has been having chest pain intermittently on and off which is longstanding and having difficulty sleeping at night.  Symptoms are consistent with heart failure.  Prior echo showed a preserved ejection fraction of 70% but has been admitted for heart failure in the past.  Pulse ox on 4 L of oxygen while in the office was down to 84% with continued tachypnea.  It was felt it would be in the best interest of the patient that she be admitted to the hospital for evaluation however because of anasarca and to try and mobilize some of this fluid to make her more comfortable.  Further management pending ED and hospital admission

## 2022-10-12 NOTE — H&P ADULT - NSHPPHYSICALEXAM_GEN_ALL_CORE
VITALS:   T(C): 36.6 (10-12-22 @ 19:00), Max: 36.6 (10-12-22 @ 19:00)  HR: 86 (10-12-22 @ 19:00) (84 - 86)  BP: 145/69 (10-12-22 @ 19:00) (142/81 - 145/69)  RR: 22 (10-12-22 @ 19:00) (20 - 22)  SpO2: 100% (10-12-22 @ 19:00) (88% - 100%)    PHYSICAL EXAM:     GENERAL: NAD, lying in bed comfortably.  HEAD:  Atraumatic, normocephalic.  EYES: EOMI, PERRLA, conjunctiva and sclera clear.  ENT: Moist mucous membranes.  NECK: Supple, no JVD, trachea midline.  CHEST/LUNG: CTAB. No rales, rhonchi, wheezing, or rubs. Unlabored respirations.  HEART: RRR, no M/R/G, S1/S2  ABDOMEN: Soft, nontender, nondistended, no organomegaly. Normoactive bowel sounds.  EXTREMITIES:  2+ peripheral pulses b/l, brisk capillary refill. No clubbing, cyanosis, or edema.  Neurological:  AAOx3, no focal deficits.   SKIN: No rashes or lesions.  PSYCH: Normal affect and mood. VITALS:   T(C): 36.6 (10-12-22 @ 19:00), Max: 36.6 (10-12-22 @ 19:00)  HR: 86 (10-12-22 @ 19:00) (84 - 86)  BP: 145/69 (10-12-22 @ 19:00) (142/81 - 145/69)  RR: 22 (10-12-22 @ 19:00) (20 - 22)  SpO2: 100% (10-12-22 @ 19:00) (88% - 100%)    PHYSICAL EXAM:     GENERAL: NAD, lying in bed, appears comfortable.  HEAD:  Atraumatic, normocephalic.  EYES: Conjunctiva and sclera clear.  ENT: Moist mucous membranes.  NECK: Supple, no JVD, no LAD appreciated.  CHEST/LUNG: CTAB. No rales, rhonchi, wheezing, or rubs. Unlabored respirations.  HEART: RRR, no M/R/G, S1/S2  ABDOMEN: Obese, nontender, lichenified lower abdomen with intertrigo. Surgical scar on R abdomen. Normoactive bowel sounds.  EXTREMITIES:  2+ peripheral pulses b/l. 1-2+ pitting edema BLE, none appreciated in UEs. Venous stasis changes BLE.  Neurological:  AAOx3.   SKIN: Intertrigo in abdominal fold, venous stasis changes of BLE topped multiple white lesions.  PSYCH: Normal affect and mood.

## 2022-10-12 NOTE — CHART NOTE - NSCHARTNOTEFT_GEN_A_CORE
Patient Name: Yanet Rios Date: 1955  Address: [address] [place] [id number]: Female  Rx Written	Rx Dispensed	Drug	Quantity	Days Supply	Prescriber Name	Prescriber Demetria #	Payment Method	Dispenser  09/19/2022	09/22/2022	oxycontin er 60 mg tablet	60	30	ZolliPaula MD	[id number]	Medicare	[id number]  08/22/2022	08/24/2022	oxycontin er 60 mg tablet	60	30	ZolliPaula MD	[id number]	Medicare	[id number]  07/20/2022	07/26/2022	oxycontin er 60 mg tablet	60	30	ZolliPaula MD	[id number]	Medicare	[id number]  06/24/2022	06/27/2022	oxycontin er 60 mg tablet	60	30	ZolliPaula MD	[id number]	Medicare	[id number]  05/25/2022	05/29/2022	oxycontin er 60 mg tablet	60	30	ZolliPaula MD	[id number]	Medicare	[id number]  04/27/2022	05/01/2022	oxycontin er 60 mg tablet	60	30	ZolliPaula MD	[id number]	Medicare	[id number]  03/29/2022	04/02/2022	oxycontin er 60 mg tablet	60	30	ZolliPaula MD	[id number]	Medicare	[id number]  02/24/2022	03/04/2022	oxycontin er 60 mg tablet	60	30	ZolliPaula MD	[id number]	Medicare	[id number]  02/03/2022	02/03/2022	oxycontin er 80 mg tablet	60	30	ZolliPaula MD	[id number]	[id number]  01/14/2022	01/17/2022	clonazepam 0.5 mg tablet	30	30	ZolliPaula MD	[id number]	Medicare	[id number]  12/15/2021	12/29/2021	oxycontin er 80 mg tablet	60	30	ZolliPaula MD	[id number]	Medicare	[id number]  12/14/2021	12/15/2021	clonazepam 0.5 mg tablet	30	30	Paula Mccann MD	[id number]	Medicare	[id number]  12/15/2021	12/15/2021	hydrocodone-acetaminophen 7.5-325 mg tablet	90	30	Paula Mccann MD	[id number]	Medicare	[id number]  11/29/2021	12/01/2021	oxycontin er 80 mg tablet	50	30	ZolPaula elise MD	[id number]	Medicare	[id number]  11/29/2021	11/30/2021	oxycontin er 80 mg tablet	10	5	ZolPaula elise MD	[id number]	Medicare	[id number]  11/15/2021	11/16/2021	clonazepam 0.5 mg tablet	30	30	Paula Mccann MD	[id number]	Medicare	[id number]  11/15/2021	11/16/2021	hydrocodone-acetaminophen 7.5-325 mg tablet	90	30	ZolPaula elise MD	[id number]	Medicare	[id number]  11/01/2021	11/01/2021	oxycontin er 80 mg tablet	60	30	Paula Mccann MD	[id number]	Medicare	[id number]  10/13/2021	10/14/2021	clonazepam 0.5 mg tablet	30	30	Ana Villalobos	[id number]	Medicare	[id number]  10/13/2021	10/14/2021	hydrocodone-acetaminophen 7.5-325 mg tablet	90	30	Ana Villalobos	[id number]	Medicare	[id number]    Patient Name: Yanet PerezBirth Date: 1955  Address: [address] [place] [address] [address] [place], [place] [address]Sex: Female  Rx Written	Rx Dispensed	Drug	Quantity	Days Supply	Prescriber Name	Prescriber Demetria #	Payment Method	Dispenser  01/30/2022	01/31/2022	methadone hcl 10 mg tablet	60	15	Manjeet Mcclure	[id number]	Insurance	[facility]  01/29/2022	01/29/2022	oxycodone hcl (ir) 5 mg tablet	30	7	Marni Lambert	[id number]	Insurance	[facility]

## 2022-10-12 NOTE — H&P ADULT - NSHPSOCIALHISTORY_GEN_ALL_CORE
Lives at home with 2 sons, one of which has Down syndrome and requires home care. Other son Abimael is HCP ([phone]). Patient uses walker at home but minimally ambulated, basically confined to bedroom (toilet was installed in bedroom to allow for easy access). She does not have HHA as not covered by insurance, but sons take care of her, are living on 2nd story of house.

## 2022-10-12 NOTE — H&P ADULT - PROBLEM SELECTOR PLAN 8
Diet: DASH/CC  DVT PPx: HSQ  Dispo: Pending medical course  GOC: DNR with trial of intubation. Patient's son to bring in advanced directives from home Diet: DASH/CC  DVT PPx: HSQ  Dispo: Pending medical course  GOC: Patient states she has advanced directives/will at home. DNR with trial of intubation. Patient's son to bring in paperwork from home

## 2022-10-12 NOTE — ED PROVIDER NOTE - PHYSICAL EXAMINATION
GENERAL: no acute distress, non-toxic appearing  HEENT: normal conjunctiva, oral mucosa moist  CARDIAC: regular rate and regular rhythm, bp reassuring  PULM: difficult to get full auscultory exam 2/2 effort/body habitus, pt on 6l NC oxygen sats mid to high 90s, pt slightly tachypnic  GI: abdomen distended with pitting edema to lower abd, no focal tenderness, negative murphys sign  : no CVA tenderness, no suprapubic tenderness  NEURO: alert and oriented x 3, normal speech, moving all extremities without lateralization  MSK: chronic venous stasis skin changes with some edema to bilat lower ext  SKIN: no visible acute/cellulitic rashes, some weeping to skin bilat lower ext L>R  PSYCH: appropriate mood and affect

## 2022-10-12 NOTE — H&P ADULT - PROBLEM SELECTOR PLAN 7
- Noted under abdominal pannus  - Nystatin powder - Noted under abdominal pannus  - Nystatin powder  - Wound care consulted

## 2022-10-12 NOTE — H&P ADULT - ATTENDING COMMENTS
66F PMH morbid obesity, moderate persistent asthma, HTN, HLD, DM, CKD3, HFpEF on 4-6L NC oxygen at baseline (not on BiPAP/CPAP qhs), p/w progressive dyspnea. Reports dyspnea with very minimal exertion and needs assistance with most of her ADLs. Pt also endorsed orthopnea and worsening abdominal distention with skin overlying it that has gotten very tough. On PE, pt morbidly obese with abdominal pannus with very thick overlying skin.Also with chronic venous stasis changes in b/l LE. Lungs clear to auscultation. Labs sig for Mild leukocytosis, elevated BNP and hypercarbia on VBG. poor quality CXR d/t body habitus however clear lungs noted. Suspect her current sxs might be d/t CHF exacerbation(Cor pulmonale) vs worsening pulm HTN that had been noted on prior echo vs  asthma exacerbation. Pt also likely with undiagnosed ENRIQUE/OHS    -c/w IV diuresis, strict I/Os  -c/w duonebs ATC  -f/u TTE  -c/w Nystatin powder for intertrigo  -can consider pulm eval in AM  -will hold off on Abx as low suspicion for infection    Rest of plan per resident note

## 2022-10-12 NOTE — H&P ADULT - PROBLEM SELECTOR PLAN 2
- Tachy, leukocytic on presentation  - Afebrile, procal wnl  - UA bland not c/f infection, pending UCx  - Leukocytosis likely reactive iso above  >>monitor off Abx for now, f/u Cx

## 2022-10-12 NOTE — H&P ADULT - HISTORY OF PRESENT ILLNESS
Patient is a 67 yo F with PMH morbid obesity, HTN, HLD, DM, CKD3, HF (last EF 55-60%) on 4-6L NC oxygen at baseline (not on BiPAP/CPAP qhs), presents to the ED from PCP Dr. Mccann's office due to concern for HF exacerbation. Pt states that for the past 2 weeks she feels her lower abdomen more tense and that she's more dyspnic on exertion. States it's difficult for her to tell if she's much more sob than usual as she's sob at baseline. States she may have felt chills the past few days. Denies any uri sxs, coughs, chest pain, lightheadedness, abd pain, n/v/d, dark/bloody stools, urinary sxs    ED course: afebrile, HR 86, BP 140s/80s, RR 20 at 96% on 6L NC. WBC 11.8, Na 140, K 5.7, Cr 1.55 (BL ~1.6), BNP 1174. RVP/COV neg. CXR clear. s/p Lasix 80 IV x1, Lokelma x1, oxy 60 x1. BCx sent. Patient is a 66 yo F with PMH morbid obesity, HTN, HLD, DM, CKD3 (BL Cr ~1.6), HFpEF on 3-4L NC oxygen at baseline (not on BiPAP/CPAP qhs), pHTN, spinal stenosis with chronic back pain, diabetic neuropathy, Charcot foot, chronic venous statis presenting from PCP's office due to concern for HF exacerbation. Patient reports SOB/LEGER for many years, however it has been progressively worsening for the last few months. Patient states that she gets short of breath with every movement, even when being assisted cleaning herself on her shower chair at home. She also states that most nights she wakes up in the middle of the night with severe shortness of breath and has to transition to a reclining chair to sleep. At BL requires 3 pillows to sleep given discomfort/pain from spinal stenosis, but doesn't The patient also notes that she has been having chills for the last week, but did not record a fever. She denies any headache, cough, or congestion. She denies abdominal pain, any changes in urination or stools. Also denies any chest pain or lightheadedness.    that for the past 2 weeks she feels her lower abdomen more tense and that she's more dyspnic on exertion. States it's difficult for her to tell if she's much more sob than usual as she's sob at baseline. States she may have felt chills the past few days. Denies any uri sxs, coughs, chest pain, lightheadedness, abd pain, n/v/d, dark/bloody stools, urinary sxs    ED course: afebrile, HR 86, BP 140s/80s, RR 20 at 96% on 6L NC. WBC 11.8, Na 140, K 5.7, Cr 1.55, BNP 1174. RVP/COV neg. CXR clear. s/p Lasix 80 IV x1, Lokelma x1, oxy 60 x1. BCx sent. Patient is a 66 yo F with PMH morbid obesity, asthma, HTN, HLD, DM, CKD3 (BL Cr ~1.6), HFpEF on 3-4L NC oxygen at baseline (not on BiPAP/CPAP qhs), pHTN, spinal stenosis with chronic back pain, diabetic neuropathy, Charcot foot, chronic venous statis presenting from PCP's office due to concern for HF exacerbation. Patient reports SOB/LEGER for many years, however it has been progressively worsening for the last few months. Patient states that she gets short of breath with every movement, even when being assisted cleaning herself on her shower chair at home. She also states that most nights she wakes up in the middle of the night with severe shortness of breath and has to transition to a reclining chair to sleep. At BL requires 3 pillows to sleep given discomfort/pain from spinal stenosis, but is unable to sleep for prolonged periods regardless. States her legs feel more swollen than normal, and reports she feels oozing from her L shin. Reports daily CP of cramping quality x2 months, but states intensity has increased in the last few weeks. The patient also notes that she has been having daily chills for the last 2 weeks, but did not record a fever. Reports dizziness and wheezing (chronic but recent increase in frequency). She denies headache, cough, congestion, sick contacts. Has chronic nausea, but denies vomiting. Denies dysuria, abdominal pain, any changes in urination or stools.     ED course: afebrile, HR 86, BP 140s/80s, RR 20 at 96% on 6L NC. WBC 11.8, Na 140, K 5.7, Cr 1.55, BNP 1174. RVP/COV neg. CXR clear. EKG unremarkable. s/p Lasix 80 IV x1, Lokelma x1, oxy 60 x1. BCx sent. Patient is a 68 yo F with PMH morbid obesity, asthma, HTN, HLD, DM, CKD3 (BL Cr ~1.6), HFpEF on 3-4L NC oxygen at baseline (not on BiPAP/CPAP qhs), pHTN, endometrial cancer s/p MEGAN/SBO, DVT (resolved), spinal stenosis with chronic back pain, diabetic neuropathy, Charcot foot, chronic venous statis presenting from PCP's office due to concern for HF exacerbation. Patient reports SOB/LEGER for many years, however it has been progressively worsening for the last few months. Patient states that she gets short of breath with every movement, even when being assisted cleaning herself on her shower chair at home. She also states that most nights she wakes up in the middle of the night with severe shortness of breath and has to transition to a reclining chair to sleep. At BL requires 3 pillows to sleep given discomfort/pain from spinal stenosis, but is unable to sleep for prolonged periods regardless. States her legs feel more swollen than normal, and reports she feels oozing from her L shin. Reports daily CP of cramping quality x2 months, but states intensity has increased in the last few weeks. The patient also notes that she has been having daily chills for the last 2 weeks, but did not record a fever. Reports dizziness and wheezing (chronic but recent increase in frequency). She denies headache, cough, congestion, sick contacts. Has chronic nausea, but denies vomiting. Denies dysuria, abdominal pain, any changes in urination or stools.     ED course: afebrile, HR 86, BP 140s/80s, RR 20 at 96% on 6L NC. WBC 11.8, Na 140, K 5.7, Cr 1.55, BNP 1174. RVP/COV neg. CXR clear. EKG unremarkable. s/p Lasix 80 IV x1, Lokelma x1, oxy 60 x1. BCx sent. Patient is a 66 yo F with PMH morbid obesity, asthma, HTN, HLD, DM, CKD3 (BL Cr ~1.6), HFpEF on 3-4L NC oxygen at baseline (not on BiPAP/CPAP qhs), pHTN, endometrial cancer s/p MEGAN/SBO, DVT (resolved), spinal stenosis with chronic back pain, diabetic neuropathy, Charcot foot, chronic venous statis presenting from PCP's office due to concern for HF exacerbation. Patient reports SOB/LEGER for many years, however it has been progressively worsening for the last few months. Patient states that she gets short of breath with every movement, even when being assisted cleaning herself on her shower chair at home. She also states that most nights she wakes up in the middle of the night with severe shortness of breath and has to transition to a reclining chair to sleep. At BL requires 3 pillows to sleep given discomfort/pain from spinal stenosis, but is unable to sleep for prolonged periods regardless. Patient states she does not use her Advair daily but is using her nebulizer daily. States her legs feel more swollen than normal, and reports she feels oozing from her L shin. Reports daily CP of cramping quality x2 months, but states intensity has increased in the last few weeks. The patient also notes that she has been having daily chills for the last 2 weeks, but did not record a fever. Reports dizziness and wheezing (chronic but recent increase in frequency). She denies headache, cough, congestion, sick contacts. Has chronic nausea, but denies vomiting. Denies dysuria, abdominal pain, any changes in urination or stools.     ED course: afebrile, HR 86, BP 140s/80s, RR 20 at 96% on 6L NC. WBC 11.8, Na 140, K 5.7, Cr 1.55, BNP 1174. RVP/COV neg. CXR clear. EKG unremarkable. s/p Lasix 80 IV x1, Lokelma x1, oxy 60 x1. BCx sent.

## 2022-10-12 NOTE — ED PROVIDER NOTE - OBJECTIVE STATEMENT
65yo F w/pmh severe morbid obesity, HTN, HLD, DM, HF on 4-6L NC oxygen at baseline (not on bipap/cpap qhs), presents to the ED from PCP Dr. Mccann's office due to concern for HF exacerbation. Pt states that for the past 2 weeks she feels her lower abdomen more tense and that she's more dyspnic on exertion. States it's difficult for her to tell if she's much more sob than usual as she's sob at baseline. States she may have felt chills the past few days. Denies any uri sxs, coughs, chest pain, lightheadedness, abd pain, n/v/d, dark/bloody stools, urinary sxs.

## 2022-10-12 NOTE — H&P ADULT - PROBLEM SELECTOR PLAN 1
- AoC HRF c/f HF exacerbation vs. asthma exacerbation vs. PE vs. exacerbation of pHTN  - h/o HFpEF last TTE 11/2020, EF 70%, suboptimal image quality  - p/w acute on chronic dyspnea, hypoxemia to 80s despite supplemental O2 via NC, and anasarca on exam  - BL 3-4L O2 at home, does not use CPAP/BiPAP qhs, uses daily nebulizer, not always compliant with Advair pump  - CXR clear, proBNP elevated to 1174, however improved from January 2022 [id number]  - noted to have pHTN at [facility] on 11/2020  - Repeat TTE  - Daily weights, strict I/O, fluid restrict  - Reports increased wheezing, which was also noted on exam  - trial duonebs, steroids  - r/o PE - f/u D-dimer - AoC HRF c/f HF exacerbation vs. asthma exacerbation vs. PE vs. exacerbation of pHTN  - h/o HFpEF last TTE 11/2020, EF 70%, suboptimal image quality  - p/w acute on chronic dyspnea, hypoxemia to 80s despite supplemental O2 via NC, and anasarca on exam  - BL 3-4L O2 at home, does not use CPAP/BiPAP qhs, uses daily nebulizer, not always compliant with Advair pump  - CXR clear, proBNP elevated to 1174, however improved from January 2022 [id number]  - noted to have pHTN at [facility] on 11/2020  - Repeat TTE  - Daily weights, strict I/O, fluid restrict  - Reports increased wheezing, which was also noted on exam  - trial duonebs, steroids  - r/o PE given h/o endometrial cancer, prior DVT (resolved) - f/u D-dimer - AoC HRF c/f HF exacerbation vs. asthma exacerbation vs. PE vs. exacerbation of pHTN  - h/o HFpEF last TTE 11/2020, EF 70%, suboptimal image quality  - p/w acute on chronic dyspnea, hypoxemia to 80s despite supplemental O2 via NC, and anasarca on exam  - BL 3-4L O2 at home, does not use CPAP/BiPAP qhs, uses daily nebulizer, not always compliant with Advair pump  - CXR clear, proBNP elevated to 1174, however improved from January 2022 1329  - noted to have pHTN at [facility] on 11/2020  - Repeat TTE  - Daily weights, strict I/O, fluid restrict  - Reports increased wheezing, which was also noted on exam  - trial duonebs, hold off on steroids for now  - r/o PE given h/o endometrial cancer, prior DVT (resolved) - f/u D-dimer - AoC HRF c/f HF exacerbation vs. asthma exacerbation vs. PE vs. exacerbation of pHTN  - h/o HFpEF last TTE 11/2020, EF 70%, suboptimal image quality  - p/w acute on chronic dyspnea, hypoxemia to 80s despite supplemental O2 via NC, and anasarca on exam  - BL 3-4L O2 at home, does not use CPAP/BiPAP qhs, uses daily nebulizer, not always compliant with Advair pump  - CXR clear, proBNP elevated to 1174, however improved from January 2022 1329  - noted to have pHTN at [facility] on 11/2020  - Repeat TTE  - Daily weights, strict I/O, fluid restrict  - Reports increased wheezing, which was also noted on exam  - trial duonebs, hold off on steroids for now  - r/o PE given h/o endometrial cancer, prior DVT (resolved) - f/u D-dimer  - trend trops to peak - AoC HRF c/f HF exacerbation vs. asthma exacerbation vs. PE vs. exacerbation of pHTN  - h/o HFpEF last TTE 11/2020, EF 70%, suboptimal image quality  - p/w acute on chronic dyspnea, hypoxemia to 80s despite supplemental O2 via NC, and anasarca on exam  - BL 3-4L O2 at home, does not use CPAP/BiPAP qhs, uses daily nebulizer, not always compliant with Advair pump  - CXR clear, proBNP elevated to 1174, however improved from January 2022 1329  - noted to have pHTN at [facility] on 11/2020  - Repeat TTE  - Daily weights, strict I/O, fluid restrict  - Reports increased wheezing, which was also noted on exam  - trial duonebs, hold off on steroids for now  - Consider pulm eval in AM  - r/o PE given h/o endometrial cancer, prior DVT (resolved) - f/u D-dimer  - trend trops to peak

## 2022-10-13 DIAGNOSIS — M54.9 DORSALGIA, UNSPECIFIED: ICD-10-CM

## 2022-10-13 LAB
A1C WITH ESTIMATED AVERAGE GLUCOSE RESULT: 7.3 % — HIGH (ref 4–5.6)
ALBUMIN SERPL ELPH-MCNC: 3.9 G/DL — SIGNIFICANT CHANGE UP (ref 3.3–5)
ALP SERPL-CCNC: 130 U/L — HIGH (ref 40–120)
ALT FLD-CCNC: 18 U/L — SIGNIFICANT CHANGE UP (ref 10–45)
ANION GAP SERPL CALC-SCNC: 11 MMOL/L — SIGNIFICANT CHANGE UP (ref 5–17)
AST SERPL-CCNC: 18 U/L — SIGNIFICANT CHANGE UP (ref 10–40)
BASOPHILS # BLD AUTO: 0.08 K/UL — SIGNIFICANT CHANGE UP (ref 0–0.2)
BASOPHILS NFR BLD AUTO: 0.8 % — SIGNIFICANT CHANGE UP (ref 0–2)
BILIRUB SERPL-MCNC: 0.8 MG/DL — SIGNIFICANT CHANGE UP (ref 0.2–1.2)
BUN SERPL-MCNC: 34 MG/DL — HIGH (ref 7–23)
CALCIUM SERPL-MCNC: 9.4 MG/DL — SIGNIFICANT CHANGE UP (ref 8.4–10.5)
CHLORIDE SERPL-SCNC: 99 MMOL/L — SIGNIFICANT CHANGE UP (ref 96–108)
CO2 SERPL-SCNC: 31 MMOL/L — SIGNIFICANT CHANGE UP (ref 22–31)
CREAT SERPL-MCNC: 1.78 MG/DL — HIGH (ref 0.5–1.3)
CULTURE RESULTS: SIGNIFICANT CHANGE UP
D DIMER BLD IA.RAPID-MCNC: 339 NG/ML DDU — HIGH
EGFR: 31 ML/MIN/1.73M2 — LOW
EOSINOPHIL # BLD AUTO: 0.35 K/UL — SIGNIFICANT CHANGE UP (ref 0–0.5)
EOSINOPHIL NFR BLD AUTO: 3.4 % — SIGNIFICANT CHANGE UP (ref 0–6)
ESTIMATED AVERAGE GLUCOSE: 163 MG/DL — HIGH (ref 68–114)
GLUCOSE BLDC GLUCOMTR-MCNC: 111 MG/DL — HIGH (ref 70–99)
GLUCOSE BLDC GLUCOMTR-MCNC: 130 MG/DL — HIGH (ref 70–99)
GLUCOSE BLDC GLUCOMTR-MCNC: 148 MG/DL — HIGH (ref 70–99)
GLUCOSE BLDC GLUCOMTR-MCNC: 171 MG/DL — HIGH (ref 70–99)
GLUCOSE BLDC GLUCOMTR-MCNC: 197 MG/DL — HIGH (ref 70–99)
GLUCOSE BLDC GLUCOMTR-MCNC: 198 MG/DL — HIGH (ref 70–99)
GLUCOSE SERPL-MCNC: 186 MG/DL — HIGH (ref 70–99)
HCT VFR BLD CALC: 38.8 % — SIGNIFICANT CHANGE UP (ref 34.5–45)
HGB BLD-MCNC: 11.6 G/DL — SIGNIFICANT CHANGE UP (ref 11.5–15.5)
IMM GRANULOCYTES NFR BLD AUTO: 1.3 % — HIGH (ref 0–0.9)
LYMPHOCYTES # BLD AUTO: 1.16 K/UL — SIGNIFICANT CHANGE UP (ref 1–3.3)
LYMPHOCYTES # BLD AUTO: 11.2 % — LOW (ref 13–44)
MAGNESIUM SERPL-MCNC: 2 MG/DL — SIGNIFICANT CHANGE UP (ref 1.6–2.6)
MCHC RBC-ENTMCNC: 27.3 PG — SIGNIFICANT CHANGE UP (ref 27–34)
MCHC RBC-ENTMCNC: 29.9 GM/DL — LOW (ref 32–36)
MCV RBC AUTO: 91.3 FL — SIGNIFICANT CHANGE UP (ref 80–100)
MONOCYTES # BLD AUTO: 0.99 K/UL — HIGH (ref 0–0.9)
MONOCYTES NFR BLD AUTO: 9.5 % — SIGNIFICANT CHANGE UP (ref 2–14)
NEUTROPHILS # BLD AUTO: 7.66 K/UL — HIGH (ref 1.8–7.4)
NEUTROPHILS NFR BLD AUTO: 73.8 % — SIGNIFICANT CHANGE UP (ref 43–77)
NRBC # BLD: 0 /100 WBCS — SIGNIFICANT CHANGE UP (ref 0–0)
PHOSPHATE SERPL-MCNC: 3.6 MG/DL — SIGNIFICANT CHANGE UP (ref 2.5–4.5)
PLATELET # BLD AUTO: 258 K/UL — SIGNIFICANT CHANGE UP (ref 150–400)
POTASSIUM SERPL-MCNC: 4.7 MMOL/L — SIGNIFICANT CHANGE UP (ref 3.5–5.3)
POTASSIUM SERPL-SCNC: 4.7 MMOL/L — SIGNIFICANT CHANGE UP (ref 3.5–5.3)
PROT SERPL-MCNC: 7.1 G/DL — SIGNIFICANT CHANGE UP (ref 6–8.3)
RBC # BLD: 4.25 M/UL — SIGNIFICANT CHANGE UP (ref 3.8–5.2)
RBC # FLD: 15.5 % — HIGH (ref 10.3–14.5)
SODIUM SERPL-SCNC: 141 MMOL/L — SIGNIFICANT CHANGE UP (ref 135–145)
SPECIMEN SOURCE: SIGNIFICANT CHANGE UP
TROPONIN T, HIGH SENSITIVITY RESULT: 48 NG/L — SIGNIFICANT CHANGE UP (ref 0–51)
TROPONIN T, HIGH SENSITIVITY RESULT: 56 NG/L — HIGH (ref 0–51)
WBC # BLD: 10.38 K/UL — SIGNIFICANT CHANGE UP (ref 3.8–10.5)
WBC # FLD AUTO: 10.38 K/UL — SIGNIFICANT CHANGE UP (ref 3.8–10.5)

## 2022-10-13 PROCEDURE — 99222 1ST HOSP IP/OBS MODERATE 55: CPT

## 2022-10-13 PROCEDURE — 99233 SBSQ HOSP IP/OBS HIGH 50: CPT | Mod: GC

## 2022-10-13 RX ORDER — BUDESONIDE AND FORMOTEROL FUMARATE DIHYDRATE 160; 4.5 UG/1; UG/1
2 AEROSOL RESPIRATORY (INHALATION)
Refills: 0 | Status: DISCONTINUED | OUTPATIENT
Start: 2022-10-13 | End: 2022-10-26

## 2022-10-13 RX ORDER — SOD,AMMONIUM,POTASSIUM LACTATE
1 CREAM (GRAM) TOPICAL EVERY 12 HOURS
Refills: 0 | Status: DISCONTINUED | OUTPATIENT
Start: 2022-10-13 | End: 2022-10-26

## 2022-10-13 RX ORDER — LORATADINE 10 MG/1
10 TABLET ORAL DAILY
Refills: 0 | Status: DISCONTINUED | OUTPATIENT
Start: 2022-10-13 | End: 2022-10-26

## 2022-10-13 RX ORDER — INSULIN GLARGINE 100 [IU]/ML
40 INJECTION, SOLUTION SUBCUTANEOUS AT BEDTIME
Refills: 0 | Status: DISCONTINUED | OUTPATIENT
Start: 2022-10-13 | End: 2022-10-21

## 2022-10-13 RX ORDER — OXYCODONE HYDROCHLORIDE 5 MG/1
10 TABLET ORAL EVERY 4 HOURS
Refills: 0 | Status: DISCONTINUED | OUTPATIENT
Start: 2022-10-13 | End: 2022-10-20

## 2022-10-13 RX ORDER — HYDROMORPHONE HYDROCHLORIDE 2 MG/ML
0.5 INJECTION INTRAMUSCULAR; INTRAVENOUS; SUBCUTANEOUS ONCE
Refills: 0 | Status: DISCONTINUED | OUTPATIENT
Start: 2022-10-13 | End: 2022-10-13

## 2022-10-13 RX ORDER — IPRATROPIUM BROMIDE 0.2 MG/ML
0 SOLUTION, NON-ORAL INHALATION
Qty: 0 | Refills: 4 | DISCHARGE

## 2022-10-13 RX ORDER — FUROSEMIDE 40 MG
40 TABLET ORAL DAILY
Refills: 0 | Status: DISCONTINUED | OUTPATIENT
Start: 2022-10-13 | End: 2022-10-17

## 2022-10-13 RX ORDER — POLYETHYLENE GLYCOL 3350 17 G/17G
17 POWDER, FOR SOLUTION ORAL ONCE
Refills: 0 | Status: COMPLETED | OUTPATIENT
Start: 2022-10-13 | End: 2022-10-14

## 2022-10-13 RX ORDER — CHOLECALCIFEROL (VITAMIN D3) 125 MCG
2000 CAPSULE ORAL DAILY
Refills: 0 | Status: DISCONTINUED | OUTPATIENT
Start: 2022-10-13 | End: 2022-10-26

## 2022-10-13 RX ADMIN — POLYETHYLENE GLYCOL 3350 17 GRAM(S): 17 POWDER, FOR SOLUTION ORAL at 06:38

## 2022-10-13 RX ADMIN — Medication 2000 UNIT(S): at 14:37

## 2022-10-13 RX ADMIN — Medication 1: at 12:01

## 2022-10-13 RX ADMIN — HEPARIN SODIUM 5000 UNIT(S): 5000 INJECTION INTRAVENOUS; SUBCUTANEOUS at 14:37

## 2022-10-13 RX ADMIN — NYSTATIN CREAM 1 APPLICATION(S): 100000 CREAM TOPICAL at 08:06

## 2022-10-13 RX ADMIN — INSULIN GLARGINE 40 UNIT(S): 100 INJECTION, SOLUTION SUBCUTANEOUS at 21:58

## 2022-10-13 RX ADMIN — HYDROMORPHONE HYDROCHLORIDE 0.5 MILLIGRAM(S): 2 INJECTION INTRAMUSCULAR; INTRAVENOUS; SUBCUTANEOUS at 09:38

## 2022-10-13 RX ADMIN — HEPARIN SODIUM 5000 UNIT(S): 5000 INJECTION INTRAVENOUS; SUBCUTANEOUS at 06:38

## 2022-10-13 RX ADMIN — HYDROMORPHONE HYDROCHLORIDE 1 MILLIGRAM(S): 2 INJECTION INTRAMUSCULAR; INTRAVENOUS; SUBCUTANEOUS at 02:32

## 2022-10-13 RX ADMIN — OXYCODONE HYDROCHLORIDE 10 MILLIGRAM(S): 5 TABLET ORAL at 14:37

## 2022-10-13 RX ADMIN — NYSTATIN CREAM 1 APPLICATION(S): 100000 CREAM TOPICAL at 21:59

## 2022-10-13 RX ADMIN — NYSTATIN CREAM 1 APPLICATION(S): 100000 CREAM TOPICAL at 11:58

## 2022-10-13 RX ADMIN — HEPARIN SODIUM 5000 UNIT(S): 5000 INJECTION INTRAVENOUS; SUBCUTANEOUS at 21:58

## 2022-10-13 RX ADMIN — Medication 3 MILLILITER(S): at 19:16

## 2022-10-13 RX ADMIN — Medication 81 MILLIGRAM(S): at 12:01

## 2022-10-13 RX ADMIN — ATORVASTATIN CALCIUM 80 MILLIGRAM(S): 80 TABLET, FILM COATED ORAL at 21:59

## 2022-10-13 RX ADMIN — OXYCODONE HYDROCHLORIDE 60 MILLIGRAM(S): 5 TABLET ORAL at 19:30

## 2022-10-13 RX ADMIN — HYDROMORPHONE HYDROCHLORIDE 0.5 MILLIGRAM(S): 2 INJECTION INTRAMUSCULAR; INTRAVENOUS; SUBCUTANEOUS at 11:58

## 2022-10-13 RX ADMIN — OXYCODONE HYDROCHLORIDE 60 MILLIGRAM(S): 5 TABLET ORAL at 01:06

## 2022-10-13 RX ADMIN — OXYCODONE HYDROCHLORIDE 60 MILLIGRAM(S): 5 TABLET ORAL at 18:29

## 2022-10-13 RX ADMIN — Medication 3 MILLILITER(S): at 06:39

## 2022-10-13 RX ADMIN — Medication 3 MILLILITER(S): at 00:19

## 2022-10-13 RX ADMIN — OXYCODONE HYDROCHLORIDE 60 MILLIGRAM(S): 5 TABLET ORAL at 06:40

## 2022-10-13 RX ADMIN — Medication 3 MILLILITER(S): at 12:01

## 2022-10-13 RX ADMIN — Medication 40 MILLIGRAM(S): at 08:28

## 2022-10-13 RX ADMIN — LORATADINE 10 MILLIGRAM(S): 10 TABLET ORAL at 21:58

## 2022-10-13 RX ADMIN — BUDESONIDE AND FORMOTEROL FUMARATE DIHYDRATE 2 PUFF(S): 160; 4.5 AEROSOL RESPIRATORY (INHALATION) at 19:16

## 2022-10-13 RX ADMIN — HYDROMORPHONE HYDROCHLORIDE 1 MILLIGRAM(S): 2 INJECTION INTRAMUSCULAR; INTRAVENOUS; SUBCUTANEOUS at 00:19

## 2022-10-13 NOTE — PATIENT PROFILE ADULT - FALL HARM RISK - HARM RISK INTERVENTIONS

## 2022-10-13 NOTE — PROGRESS NOTE ADULT - PROBLEM SELECTOR PLAN 8
Diet: DASH/CC  DVT PPx: HSQ  Dispo: Pending medical course  GOC: Patient states she has advanced directives/will at home. DNR with trial of intubation. Patient's son to bring in paperwork from home Diet: DASH/CC  DVT PPx: subQ heparin  Dispo: Pending medical course  GOC: Patient states she has advanced directives/will at home. DNR with trial of intubation. Patient's son to bring in paperwork from home

## 2022-10-13 NOTE — CONSULT NOTE ADULT - SUBJECTIVE AND OBJECTIVE BOX
Wound SURGERY CONSULT NOTE    HPI:  Patient is a 66 yo F with PMH morbid obesity, asthma, HTN, HLD, DM, CKD3 (BL Cr ~1.6), HFpEF on 3-4L NC oxygen at baseline (not on BiPAP/CPAP qhs), pHTN, endometrial cancer s/p MEGAN/SBO, DVT (resolved), spinal stenosis with chronic back pain, diabetic neuropathy, Charcot foot, chronic venous statis presenting from PCP's office due to concern for HF exacerbation. Patient reports SOB/LEGER for many years, however it has been progressively worsening for the last few months. Patient states that she gets short of breath with every movement, even when being assisted cleaning herself on her shower chair at home. She also states that most nights she wakes up in the middle of the night with severe shortness of breath and has to transition to a reclining chair to sleep. At BL requires 3 pillows to sleep given discomfort/pain from spinal stenosis, but is unable to sleep for prolonged periods regardless. Patient states she does not use her Advair daily but is using her nebulizer daily. States her legs feel more swollen than normal, and reports she feels oozing from her L shin. Reports daily CP of cramping quality x2 months, but states intensity has increased in the last few weeks. The patient also notes that she has been having daily chills for the last 2 weeks, but did not record a fever. Reports dizziness and wheezing (chronic but recent increase in frequency). She denies headache, cough, congestion, sick contacts. Has chronic nausea, but denies vomiting. Denies dysuria, abdominal pain, any changes in urination or stools.     ED course: afebrile, HR 86, BP 140s/80s, RR 20 at 96% on 6L NC. WBC 11.8, Na 140, K 5.7, Cr 1.55, BNP 1174. RVP/COV neg. CXR clear. EKG unremarkable. s/p Lasix 80 IV x1, Lokelma x1, oxy 60 x1. BCx sent. (12 Oct 2022 21:07)        N/V/D,  BM/ Flatus,   NGT,     palp/ sob/dyspnea/ cp,       F/C/S  Wound consult requested by team to assist w/ management of      wound/ pressure injury.   Pt (unable to)  c/o pain, drainage, odor, color change,  worsening swelling. Offloading and pericare initiated Increasingly sedentary 2/2 to illness. Pt is Incontinent of urine & stool. (+)de la torre/ ostomy.   H/o falls, trauma.  Pt seen by Wound RN  CAVILON Advance/  Alessandro,TRIAD/ María/ Allevyn/ medihoney/ dakins/ Adaptic/ DSD recommened used at home/ while awaiting consult.  Appetite good/ decreased.  weight loss.  S&S / RD consult appreciated All questions asked and answered to pt's and family's expressed understanding and satisfaction.    Current Diet: Diet, Consistent Carbohydrate w/Evening Snack:   DASH/TLC Sodium & Cholesterol Restricted (DASH)  1000mL Fluid Restriction ([id number]) (10-12-22 @ 21:17)      PAST MEDICAL & SURGICAL HISTORY:  Diabetes Mellitus Type II      HTN (Hypertension)      Endometrial Hyperplasia      Cervical Stenosis of Spine      Spinal Stenosis, Lumbar      Deep Vein Thrombosis (DVT)  Left leg, 2004, treated and resolved      Dyslipidemia      Cataract      Morbid Obesity      Vitamin D deficiency      Insomnia      CKD (chronic kidney disease)  ~ III      Congestive heart failure  ~ HFpEF      Uterine cancer      Asthma      On home oxygen therapy      Gait difficulty  ~ u ses walker      Cataract extracted with lens implant 1998  Right      C Section 1994      Cholecystectomy/appendectomy @ age 25      D&amp;C x2 1980&#x27;s      D&amp;C 2008  hysteroscopy, endometrial hyperplasia, 2009      Cervical Spinal Stenosis surgery x2 (01/2002, 7/2002)      Tonsillectomy as a child      Endometrial biopsy 12/02/09      H/O laser iridotomy  left eye, 2016      H/O colonoscopy  1998      S/P total abdominal hysterectomy and bilateral salpingo-oophorectomy      S/P appendectomy      S/P cholecystectomy          REVIEW OF SYSTEMS: Pt unable to offer  General/ Breast/ Skin/ Neuro/ MSK: see HPI  All other systems negative    MEDICATIONS  (STANDING):  albuterol/ipratropium for Nebulization 3 milliLiter(s) Nebulizer every 6 hours  aspirin enteric coated 81 milliGRAM(s) Oral daily  atorvastatin 80 milliGRAM(s) Oral at bedtime  dextrose 50% Injectable 25 Gram(s) IV Push once  dextrose 50% Injectable 12.5 Gram(s) IV Push once  dextrose 50% Injectable 25 Gram(s) IV Push once  dextrose Oral Gel 15 Gram(s) Oral once  furosemide   Injectable 40 milliGRAM(s) IV Push daily  glucagon  Injectable 1 milliGRAM(s) IntraMuscular once  heparin   Injectable 5000 Unit(s) SubCutaneous every 8 hours  insulin glargine Injectable (LANTUS) 50 Unit(s) SubCutaneous at bedtime  insulin lispro (ADMELOG) corrective regimen sliding scale   SubCutaneous three times a day before meals  insulin lispro (ADMELOG) corrective regimen sliding scale   SubCutaneous at bedtime  nystatin Powder 1 Application(s) Topical three times a day  oxyCODONE  ER Tablet 60 milliGRAM(s) Oral every 12 hours  polyethylene glycol 3350 17 Gram(s) Oral two times a day    MEDICATIONS  (PRN):  acetaminophen     Tablet .. 650 milliGRAM(s) Oral every 6 hours PRN Temp greater or equal to 38C (100.4F), Mild Pain (1 - 3)  ondansetron    Tablet 4 milliGRAM(s) Oral every 8 hours PRN Nausea and/or Vomiting      Allergies    adhesives (Rash)  Bactrim (Flushing)  latex (Rash)  wool- rash, itch (Other)    Intolerances        SOCIAL HISTORY:  / /single/ ; (+)HHA/ lives in SNF; Former smoker, Nourrent/ Denies smoking, ETOH, drugs    FAMILY HISTORY:  Family history of pancreatic cancer    Family history of bladder cancer    Family history of lung cancer (Grandparent)     no h/o PVD or wound healing or skin/ significant problems    PHYSICAL EXAM:  Vital Signs Last 24 Hrs  T(C): 36.4 (13 Oct 2022 11:26), Max: 37.2 (12 Oct 2022 21:40)  T(F): 97.6 (13 Oct 2022 11:26), Max: 99 (12 Oct 2022 21:40)  HR: 84 (13 Oct 2022 11:26) (81 - 95)  BP: 142/63 (13 Oct 2022 11:26) (111/66 - 145/69)  BP(mean): 80 (13 Oct 2022 00:30) (80 - 80)  RR: 20 (13 Oct 2022 11:26) (18 - 22)  SpO2: 93% (13 Oct 2022 11:26) (88% - 100%)    Parameters below as of 13 Oct 2022 11:26  Patient On (Oxygen Delivery Method): nasal cannula        NAD / Guarded but stable,  A&Ox3/ Alert/ Confused  cachectic/ thin/  MO/ Obese/ frail  WD/ WN/ WG/ Disheveled  Total Care Sport/ Versa Care P500 / Envella Progressa bed     HEENT:  NC/AT, PERRL, EOMI, sclera clear, mucosa moist, throat clear, trachea midline, neck supple, trach  Respiratory: nonlabored w/ equal chest rise  Gastrointestinal soft NT/ND (+)BS  (+)PEG (+)ostomy (+)NGT  : (+)de la torre/ purewick  Neurology:  weakened strength & sensation grossly intact/ paraesthesia  nonverbal, no follow commands/ paraplegic  Psych: calm/ appropriate/ flat affect/ easily aggitated/ restless  Musculoskeletal:  limited stiff / FROM, no deformities/ contractures  Vascular: BLE equally warm/ cool,  no cyanosis, clubbing, edema           >LE //BLE edema equal           BLE DP/PT pulses palpable           no acute ischemia noted          BLE hemosiderin staining  Skin:  moist w/ good turgor  pale, frail,  ecchymosis w/o hematoma  serosanguinous drainage  No odor, erythema, increased warmth, tenderness, induration, fluctuance, nor crepitus    LABS/ CULTURES/ RADIOLOGY:                        11.6   10.38 )-----------( 258      ( 13 Oct 2022 11:47 )             38.8       141  |  99  |  34  ----------------------------<  186      [10-13-22 @ 11:47]  4.7   |  31  |  1.78        Ca     9.4     [10-13-22 @ 11:47]      iCa    1.10     [10-12 @ 18:52]      Mg     2.0     [10-13-22 @ 11:47]      Phos  3.6     [10-13-22 @ 11:47]    TPro  7.1  /  Alb  3.9  /  TBili  0.8  /  DBili  x   /  AST  18  /  ALT  18  /  AlkPhos  130  [10-13-22 @ 11:47]    PT/INR: PT 12.5 , INR 1.09       [10-12-22 @ 18:17]  PTT: 26.1       [10-12-22 @ 18:17]      Hemoglobin A1C                     A/P:    Wound Consult requested to assist w/ management of      Consider VINOD/PVR, XRay, Duplex, MRI, CT  BLE elevation & Compression  Abx per Medicine/ ID  Moisturize intact skin w/ SWEEN cream BID  Nutrition Consult for optimization in pt w/ Severe Protein Calorie Malnutrition,        Inadequate PO intake, & Increased nutritional needs            encourage high quality protein, MVI & Vit C to promote wound healing  Hyperglycemia - improving w/ ADA diet and Lantus/ NPH,         FS w/ ISS q6h/ qmeal & qhs, consider Endo Consult, consider HgA1c  Anemia- improving transfusions, Fe studies, protonix  Continue turning and positioning w/ offloading assistive devices as per protocol  Buttocks/ Sacrum Alessandro/ TRIAD BID /CAVILON ADVANCE TIW and prn soiling //       Continue w/ attends under pads and Pericare w/ de la torre maintanence / purewick care as per protocol  Waffle Cushion to chair when oob to chair  Continue w/ low air loss pressure redistribution bed surface   Care as per medicine, will follow w/ you  Upon discharge f/u as outpatient at [facility] [address] [place] [address] [phone]  Seen w/ attdeborah & RN and D/w team & RN  Thank you for this consult  Etelvina Osborne PA-C CWS [id number]       Wound SURGERY CONSULT NOTE    HPI:  66 yo F with PMH morbid obesity, asthma, HTN, HLD, DM, CKD3 (BL Cr ~1.6), HFpEF on 3-4L NC oxygen at baseline (not on BiPAP/CPAP qhs), pHTN, endometrial cancer s/p MEGAN/SBO, DVT (resolved), spinal stenosis with chronic back pain, diabetic neuropathy, Charcot foot, chronic venous statis presenting from PCP's office due to concern for HF exacerbation. Patient reports SOB/LEGER for many years, however it has been progressively worsening for the last few months. Patient states that she gets short of breath with every movement, even when being assisted cleaning herself on her shower chair at home. She also states that most nights she wakes up in the middle of the night with severe shortness of breath and has to transition to a reclining chair to sleep. At BL requires 3 pillows to sleep given discomfort/pain from spinal stenosis, but is unable to sleep for prolonged periods regardless. Patient states she does not use her Advair daily but is using her nebulizer daily. States her legs feel more swollen than normal, and reports she feels oozing from her L shin. Reports daily CP of cramping quality x2 months, but states intensity has increased in the last few weeks. The patient also notes that she has been having daily chills for the last 2 weeks, but did not record a fever. Reports dizziness and wheezing (chronic but recent increase in frequency). She denies headache, cough, congestion, sick contacts. Has chronic nausea, but denies vomiting. Denies dysuria, abdominal pain, any changes in urination or stools.     ED course: afebrile, HR 86, BP 140s/80s, RR 20 at 96% on 6L NC. WBC 11.8, Na 140, K 5.7, Cr 1.55, BNP 1174. RVP/COV neg. CXR clear. EKG unremarkable. s/p Lasix 80 IV x1, Lokelma x1, oxy 60 x1. BCx sent.      Wound consult requested by team to assist w/ management of BLE and pannus/ skin fold wounds.  Pt w/o c/o pain, odor, color change.  (+)Dry skin, and leg swelling and LLE clear drainage. Can't really use compression.  Had Rt foot wound that healed- now new wounds on toes.  Pt doesn't have special shoes.  Pt w/ moisture in skin folds,  Doesn't really note any wounds/ odor.  Offloading and pericare initiated Increasingly sedentary 2/2 to heart failure and size.  illness. Pt is Incontinent of urine & stool. Pt denies bug bites, falls, trauma.  Appetite good w/o weight loss.  All questions asked and answered to pt's expressed understanding and satisfaction.    Current Diet: Diet, Consistent Carbohydrate w/Evening Snack:   DASH/TLC Sodium & Cholesterol Restricted (DASH)  1000mL Fluid Restriction ([id number]) (10-12-22 @ 21:17)      PAST MEDICAL & SURGICAL HISTORY:  Diabetes Mellitus Type II    HTN (Hypertension)    Endometrial Hyperplasia, Uterine cancer  s/p Endometrial biopsy  S/P total abdominal hysterectomy and bilateral salpingo-oophorectomy    Deep Vein Thrombosis (DVT)  Left leg, 2004, treated and resolved    Dyslipidemia    Morbid Obesity    Vitamin D deficiency    Insomnia    CKD (chronic kidney disease) III    Congestive heart failure, HFpEF    Asthma    On home oxygen therapy    s/p Rt eye Cataract extracted with lens implant     s/p C Section 1994    s/p Cholecystectomy/appendectomy    s/p D&C x2    Cervical Stenosis of Spine  Spinal Stenosis, Lumbar  Cervical Spinal Stenosis surgery x2 (01/2002, 7/2002)    s/p Tonsillectomy    s/p laser iridotomy      REVIEW OF SYSTEMS: General/ Skin/ MSK: see HPI  All other systems negative    MEDICATIONS  (STANDING):  albuterol/ipratropium for Nebulization 3 milliLiter(s) Nebulizer every 6 hours  aspirin enteric coated 81 milliGRAM(s) Oral daily  atorvastatin 80 milliGRAM(s) Oral at bedtime  dextrose 50% Injectable 25 Gram(s) IV Push once  dextrose 50% Injectable 12.5 Gram(s) IV Push once  dextrose 50% Injectable 25 Gram(s) IV Push once  dextrose Oral Gel 15 Gram(s) Oral once  furosemide   Injectable 40 milliGRAM(s) IV Push daily  glucagon  Injectable 1 milliGRAM(s) IntraMuscular once  heparin   Injectable 5000 Unit(s) SubCutaneous every 8 hours  insulin glargine Injectable (LANTUS) 50 Unit(s) SubCutaneous at bedtime  insulin lispro (ADMELOG) corrective regimen sliding scale   SubCutaneous three times a day before meals  insulin lispro (ADMELOG) corrective regimen sliding scale   SubCutaneous at bedtime  nystatin Powder 1 Application(s) Topical three times a day  oxyCODONE  ER Tablet 60 milliGRAM(s) Oral every 12 hours  polyethylene glycol 3350 17 Gram(s) Oral two times a day    MEDICATIONS  (PRN):  acetaminophen  Tablet 650 milliGRAM(s) Oral every 6 hours PRN Temp greater or equal to 38C (100.4F), Mild Pain (1 - 3)  ondansetron Tablet 4 milliGRAM(s) Oral every 8 hours PRN Nausea and/or Vomiting      Allergies  adhesives (Rash)  Bactrim (Flushing)  latex (Rash)  wool- rash, itch (Other)    SOCIAL HISTORY:  Denies smoking, ETOH, drugs    FAMILY HISTORY:  no h/o PVD or wound healing or skin problems  Family history of pancreatic cancer, bladder cancer, & lung cancer (Grandparent)      PHYSICAL EXAM:  Vital Signs Last 24 Hrs  T(C): 36.4 (13 Oct 2022 11:26), Max: 37.2 (12 Oct 2022 21:40)  T(F): 97.6 (13 Oct 2022 11:26), Max: 99 (12 Oct 2022 21:40)  HR: 84 (13 Oct 2022 11:26) (81 - 95)  BP: 142/63 (13 Oct 2022 11:26) (111/66 - 145/69)  BP(mean): 80 (13 Oct 2022 00:30) (80 - 80)  RR: 20 (13 Oct 2022 11:26) (18 - 22)  SpO2: 93% (13 Oct 2022 11:26) (88% - 100%)    Parameters below as of 13 Oct 2022 11:26  Patient On (Oxygen Delivery Method): nasal cannula      NAD  A&Ox3  MO  Versa Care P500 bed  HEENT:  NC/AT, EOMI, sclera clear, mucosa moist, throat clear, trachea midline, neck supple  Respiratory: nonlabored w/ equal chest rise  Gastrointestinal soft NT/ND   : (+)purewick  Neurology: strength & sensation grossly intact  Psych: calm/ appropriate  Musculoskeletal:   FROM, no deformities/ contractures  Vascular: BLE equally warm,  no cyanosis, clubbing, edema        BLE edema equal,         Rt foot Charcot deformity, Lateral plantar foot w/ dry callous            Rt 2nd toe dry scab and 3rd toe intact blister             no palp DP/PT pulses palpable        Lt (+)DP pulse palpable, Lt 2&3rd toes w/scabs and 5th toe w/ dry ulcer              no discrete lesion w/ drainage        no acute ischemia noted        BLE hemosiderin staining  Skin:  dry w/ good turgor  Abdominal and Groin Pannus skin folds w/ moist skin w/ erythema suggestive of fungal rash     no open skin or blistering or discrete area of drainage  No odor, erythema, increased warmth, tenderness, induration, fluctuance, nor crepitus    LABS/ CULTURES/ RADIOLOGY:                        11.6   10.38 )-----------( 258      ( 13 Oct 2022 11:47 )             38.8       141  |  99  |  34  ----------------------------<  186      [10-13-22 @ 11:47]  4.7   |  31  |  1.78        Ca     9.4     [10-13-22 @ 11:47]      iCa    1.10     [10-12 @ 18:52]      Mg     2.0     [10-13-22 @ 11:47]      Phos  3.6     [10-13-22 @ 11:47]    TPro  7.1  /  Alb  3.9  /  TBili  0.8  /  DBili  x   /  AST  18  /  ALT  18  /  AlkPhos  130  [10-13-22 @ 11:47]    PT/INR: PT 12.5 , INR 1.09       [10-12-22 @ 18:17]  PTT: 26.1       [10-12-22 @ 18:17]    A1C with Estimated Average Glucose 7.3% (10.13.22 @ 11:47)

## 2022-10-13 NOTE — CONSULT NOTE ADULT - NS ATTEND AMEND GEN_ALL_CORE FT
Pt seen and examined with ACP.  Assessment and plan reviewed and discussed.  Agree with above.  D/W Medical attending    Status of wounds and treatment recommendations d/w  pt.  All questions answered.   Pt expressed understanding.    I spent 50  minutes face to face w/ this pt of which more than 50% of the time was spent counseling & coordinating care of this pt.

## 2022-10-13 NOTE — PROGRESS NOTE ADULT - PROBLEM SELECTOR PLAN 3
- Cr 1.55 on admission, BL ~1.6  - K elevated to 5.7 on admission  - s/p Lasix 80 IV x1, Lokelma x1 in ED  - EKG unremarkable  - Monitor BMP for resolution  - I/O as above  - Avoid nephrotoxins, dose per GFR - Cr 1.55->1.78, BL ~1.6  - K elevated to 5.7 on admission, now improved  - s/p Lasix 80 IV x1, Lokelma x1 in ED  - EKG unremarkable  - I/O as above  - Avoid nephrotoxins, dose per GFR

## 2022-10-13 NOTE — PHARMACOTHERAPY INTERVENTION NOTE - COMMENTS
Confirmed home medications with patient and pharmacy, updated in Outpatient Medication Review.     Added:  Toujeo solostar - 80 units in the morning and 84 units at night  Lisinopril 5 mg daily    Removed:  Ipratropium nasal spray  Senna 2 tablets QHS    Changed:  Miralax from BID to daily    Communicated with MD.    Mini Shah, PharmD, [facility]  Clinical Pharmacy Specialist  [phone] or Teams

## 2022-10-13 NOTE — CONSULT NOTE ADULT - ASSESSMENT
A/P: 66F with PMH morbid obesity, moderate persistant asthma, HTN, HLD, DM, CKD3, HFpEF on 4-6L NC oxygen at baseline (not on BiPAP/CPAP qhs), presenting with SOB/LEGER c/f HF exacerbation vs. asthma exacerbation vs. PE vs. exacerbation of pHTN    Wound Consult requested to assist w/ management of BLE PVD/ PAD  BLE feet w/ wounds  Incontinence of urine and stool  Candidiasis Moisture Dermatitis of Groin/ Panus skin folds    BLE feet wounds- CAVILON while awaiting dpm   Consider VINOD/PVR  BLE elevation & Compression  Abx per Medicine  Moisturize intact skin w/ SWEEN cream BID  Nutrition Consult for optimization            encourage high quality protein, MVI & Vit C to promote wound healing  Hyperglycemia - ADA diet and Lantus & FS w/ ISS  Continue turning and positioning w/ offloading assistive devices as per protocol  GROIN/ SKIN FOlds/ PANUS- After cleansing - Tuck INTERDry Ag QD and prn soiling  Buttocks/ Sacrum  TRIAD BID and prn soiling        Continue w/ attends under pads and Pericare w/ purewick care as per protocol  Waffle Cushion to chair when oob to chair  Continue w/ low air loss pressure redistribution bed surface   Care as per medicine, will follow w/ you  Upon discharge f/u as outpatient at [facility] [address] [place] [address] [phone]  Seen w/ attng & RN and D/w team & RN  Thank you for this consult  Etelvina Osborne PA-C CWS [id number]  I spent 50minutes face to face w/ this pt of which more than 50% of the time was spent counseling & coordinating care of this pt.  A/P: 66F with PMH morbid obesity, moderate persistant asthma, HTN, HLD, DM, CKD3, HFpEF on 4-6L NC oxygen at baseline (not on BiPAP/CPAP qhs), presenting with SOB/LEGER c/f HF exacerbation vs. asthma exacerbation vs. PE vs. exacerbation of pHTN    Wound Consult requested to assist w/ management of:  BLE PVD/ PAD  BLE feet w/ wounds  Incontinence of urine and stool  Candidiasis Moisture Dermatitis of Groin/ Panus skin folds    BLE feet wounds- CAVILON while awaiting dpm   Consider VINOD/PVR  BLE elevation & Compression  Abx per Medicine  Moisturize intact skin w/ SWEEN cream BID  Nutrition Consult for optimization            encourage high quality protein, MVI & Vit C to promote wound healing  Hyperglycemia - ADA diet and Lantus & FS w/ ISS  Continue turning and positioning w/ offloading assistive devices as per protocol  GROIN/ SKIN FOlds/ PANUS- After cleansing - Tuck INTERDry Ag QD and prn soiling  Buttocks/ Sacrum  TRIAD BID and prn soiling        Continue w/ attends under pads and Pericare w/ purewick care as per protocol  Waffle Cushion to chair when oob to chair  Continue w/ low air loss pressure redistribution bed surface   Care as per medicine, will follow w/ you  Upon discharge f/u as outpatient at [facility] Center [address] [place] [address] [phone]  Seen w/ attng & RN and D/w team & RN  Thank you for this consult  Etelvina Osborne PA-C CWS [id number]

## 2022-10-13 NOTE — PROGRESS NOTE ADULT - PROBLEM SELECTOR PLAN 1
- AoC HRF c/f HF exacerbation vs. asthma exacerbation vs. PE vs. exacerbation of pHTN  - h/o HFpEF last TTE 11/2020, EF 70%, suboptimal image quality  - p/w acute on chronic dyspnea, hypoxemia to 80s despite supplemental O2 via NC, and anasarca on exam  - BL 3-4L O2 at home, does not use CPAP/BiPAP qhs, uses daily nebulizer, not always compliant with Advair pump  - CXR clear, proBNP elevated to 1174, however improved from January 2022 1329  - noted to have pHTN at [facility] on 11/2020  - Repeat TTE  - Daily weights, strict I/O, fluid restrict  - Reports increased wheezing, which was also noted on exam  - trial duonebs, hold off on steroids for now  - Consider pulm eval in AM  - r/o PE given h/o endometrial cancer, prior DVT (resolved) - f/u D-dimer  - trend trops to peak Patient presenting w/ dyspnea and hypoxemia despite supplemental O2, noted to be anasarcic by family and outpatient providers. Reports increased wheezing. Hx notable for asthma, HF, limited mobility. Consider HF exacerbation vs. asthma exacerbation vs. PE vs. exacerbation of pHTN. D-dimer 339, wnl for patient's age, low suspicion for PE.  - last TTE 11/2020, EF 70%, suboptimal image quality  - trops peaked 10/13  - BL 3-4L O2 at home, does not use CPAP/BiPAP qhs, uses daily nebulizer, not always compliant with Advair pump  - CXR clear, proBNP elevated to 1174, however improved from January 2022 1329  - noted to have pHTN at [facility] on 11/2020  - Repeat TTE  - lasix 40mg IV QD  - trial duonebs, hold off on steroids for now  - continue singulair (therapeutic interchange for Advair)  - Daily weights, strict I/O, fluid restrict

## 2022-10-13 NOTE — PROGRESS NOTE ADULT - PROBLEM SELECTOR PLAN 7
- Noted under abdominal pannus  - Nystatin powder  - Wound care consulted - Noted under abdominal pannus  - Nystatin powder  - Wound care consulted, f/u recs

## 2022-10-13 NOTE — PROGRESS NOTE ADULT - SUBJECTIVE AND OBJECTIVE BOX
Internal Medicine   Alejandro Mckeon | PGY-1    OVERNIGHT EVENTS: No acute overnight events.    SUBJECTIVE: Reports shortness of breath and cramping chest pain that has continued since admission. Denies fevers. Requesting pain medication for chronic lower back and leg pain as she has hx of spinal stenosis. Has been adjusting home pain regimen w/ internist, recently stopped using short-acting meds which worsened her pain.      MEDICATIONS  (STANDING):  albuterol/ipratropium for Nebulization 3 milliLiter(s) Nebulizer every 6 hours  ammonium lactate 12% Lotion 1 Application(s) Topical every 12 hours  aspirin enteric coated 81 milliGRAM(s) Oral daily  atorvastatin 80 milliGRAM(s) Oral at bedtime  budesonide 160 MICROgram(s)/formoterol 4.5 MICROgram(s) Inhaler 2 Puff(s) Inhalation two times a day  cholecalciferol 2000 Unit(s) Oral daily  dextrose 50% Injectable 25 Gram(s) IV Push once  dextrose 50% Injectable 12.5 Gram(s) IV Push once  dextrose 50% Injectable 25 Gram(s) IV Push once  dextrose Oral Gel 15 Gram(s) Oral once  furosemide   Injectable 40 milliGRAM(s) IV Push daily  glucagon  Injectable 1 milliGRAM(s) IntraMuscular once  heparin   Injectable 5000 Unit(s) SubCutaneous every 8 hours  insulin glargine Injectable (LANTUS) 40 Unit(s) SubCutaneous at bedtime  insulin lispro (ADMELOG) corrective regimen sliding scale   SubCutaneous three times a day before meals  insulin lispro (ADMELOG) corrective regimen sliding scale   SubCutaneous at bedtime  nystatin Powder 1 Application(s) Topical three times a day  oxyCODONE  ER Tablet 60 milliGRAM(s) Oral every 12 hours  polyethylene glycol 3350 17 Gram(s) Oral once    MEDICATIONS  (PRN):  acetaminophen     Tablet .. 650 milliGRAM(s) Oral every 6 hours PRN Temp greater or equal to 38C (100.4F), Mild Pain (1 - 3)  ondansetron    Tablet 4 milliGRAM(s) Oral every 8 hours PRN Nausea and/or Vomiting  oxyCODONE    IR 10 milliGRAM(s) Oral every 4 hours PRN Severe Pain (7 - 10)    VITALS:  T(F): 98.8 (10-13-22 @ 16:18), Max: 99 (10-12-22 @ 21:40)  HR: 82 (10-13-22 @ 16:18) (81 - 95)  BP: 136/68 (10-13-22 @ 16:18) (111/66 - 145/69)  BP(mean): 80 (10-13-22 @ 00:30) (80 - 80)  RR: 18 (10-13-22 @ 16:18) (18 - 22)  SpO2: 95% (10-13-22 @ 16:18) (88% - 100%)    PHYSICAL EXAM:   GENERAL: morbidly obese, resting in bed, no acute distress, limited mobility w/ bed turning  HEAD: Atraumatic, normocephalic  EYES: EOMI, conjunctiva and sclera clear  ENT: Moist mucous membranes  CHEST/LUNG: Limited assessment due to habitus; clear to auscultation bilaterally; no rales, rhonchi, wheezing, or rubs; increased WOB w/ deep breaths  HEART: Limited assessment due to habitus; regular rate and rhythm; no murmurs, rubs, or gallops, normal S1/S2  ABDOMEN: Large pannus; soft, nontender, nondistended;  EXTREMITIES: Pitting edema in bilateral lower extremities; grey scabs w/ ~1cm healing/dried wounds diffusely across bilateral ankles; no clubbing or cyanosis  MSK: No gross deformities noted   Neurological: Grossly nonfocal   SKIN: Findings in extremities as noted above; otherwise no appreciable rashes or lesions though assessment limited due to habitus  PSYCH: Normal mood, affect     LABS:                      11.6   10.38 )-----------( 258      ( 13 Oct 2022 11:47 )             38.8     10-13  141  |  99  |  34<H>  ----------------------------<  186<H>  4.7   |  31  |  1.78<H>    Ca    9.4      13 Oct 2022 11:47  Phos  3.6     10-13  Mg     2.0     10-13    TPro  7.1  /  Alb  3.9  /  TBili  0.8  /  DBili  x   /  AST  18  /  ALT  18  /  AlkPhos  130<H>  10-13    PT/INR - ( 12 Oct 2022 18:17 )   PT: 12.5 sec;   INR: 1.09 ratio    PTT - ( 12 Oct 2022 18:17 )  PTT:26.1 sec    Blood Gas Profile w/Lytes - Venous: Performed In Lab (10-12-22 @ 22:21)    Creatinine Trend: 1.78<--, 1.55<--    Blood Gas Profile w/Lytes - Venous: Performed In Lab (10-12-22 @ 22:21)

## 2022-10-13 NOTE — PROGRESS NOTE ADULT - ATTENDING COMMENTS
Patient seen with wound care on rounds. Patient with subacute worsening SOB, for weeks to months becomes SOB with minimal exertion, cannot sleep at night as needs to go to chair from gasping for air. Unclear weight gain or edema 2/2 overall body habitus. Currently respiratory status is stable from prior.   Labs reviewed  On physical exam, morbidly obese. No overt wounds, MAD in intertrigenous areas, +venous changes with scaling and lymphedema LE.  Unofficial US: Diffuse B-line anterior. Unable to get accurate diaphragm views  Unofficial POCUS heart: unable to obtain views    #Acute decompensated heart failure  -continue with lasix 40mg IV BID  -primafit for I/O, obtain weight here though do not have baseline.   -TTE to see if can get an approximate EF.  -initial bicarbonate was 21, repeat 31. Will repeat in AM along with VBG. Initial thought with bicarb of 21 was that obesity hypoventiliation syndrome not a component given low bicarb, but if that was erroneous and true bicarb 31, then right sided pressures could be contributing to right sided swelling.     #CKD  -baseline Cre ~1.6  -monitor.     #Chronic pain  -continue oxycontin 60mg BID *home medication)  -add oxycodone 10mg q4hr for mod-severe pain, hold for sedation.     #Morbid obesity  -nutrition consult.   -after acute issues can discuss with outpatient provider GLP-1.    #Elevated D-dlimer  -sent overnight, age-adjusted normal. givne subacute nature of SOB, have low suspcion for PE, is normal for age adjusted Dimer. However will check duplex LE to r/o clot.

## 2022-10-13 NOTE — PROGRESS NOTE ADULT - ASSESSMENT
66F with PMH morbid obesity, moderate persistant asthma, HTN, HLD, DM, CKD3, HFpEF on 4-6L NC oxygen at baseline (not on BiPAP/CPAP qhs), presenting with SOB/LEGER c/f HF exacerbation vs. asthma exacerbation vs. PE vs. exacerbation of pHTN

## 2022-10-13 NOTE — PROGRESS NOTE ADULT - PROBLEM SELECTOR PLAN 4
- On Toujeo 80 units AM, 84 units PM + Novolog 20 qac at home  - c/w Lantus at reduced dose to 50 qhs for now, titrate as needed  - Hold premeal insulin for now  - Low dose correctional scale - On Toujeo 80 units AM, 84 units PM + Novolog 20 qac at home  - c/w Lantus at reduced dose to 40 qhs for now, titrate as needed  - Hold premeal insulin for now  - Low dose correctional scale

## 2022-10-13 NOTE — PROGRESS NOTE ADULT - PROBLEM SELECTOR PLAN 2
- Tachy, leukocytic on presentation  - Afebrile, procal wnl  - UA bland not c/f infection, pending UCx  - Leukocytosis likely reactive iso above  >>monitor off Abx for now, f/u Cx Patient w/ chronic lower back and leg pain 2/2 spinal stenosis. Largely bedbound at baseline. Has oxycontin 60mg Q12H at home, per patient recently dc'ed short acting meds upon discussion w/ her internist as she did not want to feel drowsy.  - continue home oxycontin 60mg Q12H  - oxy 10mg PO Q4H PRN for breakthrough pain

## 2022-10-14 LAB
ANION GAP SERPL CALC-SCNC: 11 MMOL/L — SIGNIFICANT CHANGE UP (ref 5–17)
BASE EXCESS BLDV CALC-SCNC: 7.9 MMOL/L — HIGH (ref -2–3)
BUN SERPL-MCNC: 34 MG/DL — HIGH (ref 7–23)
CA-I SERPL-SCNC: 1.2 MMOL/L — SIGNIFICANT CHANGE UP (ref 1.15–1.33)
CALCIUM SERPL-MCNC: 9.1 MG/DL — SIGNIFICANT CHANGE UP (ref 8.4–10.5)
CHLORIDE BLDV-SCNC: 98 MMOL/L — SIGNIFICANT CHANGE UP (ref 96–108)
CHLORIDE SERPL-SCNC: 99 MMOL/L — SIGNIFICANT CHANGE UP (ref 96–108)
CO2 BLDV-SCNC: 38 MMOL/L — HIGH (ref 22–26)
CO2 SERPL-SCNC: 30 MMOL/L — SIGNIFICANT CHANGE UP (ref 22–31)
CREAT SERPL-MCNC: 1.83 MG/DL — HIGH (ref 0.5–1.3)
EGFR: 30 ML/MIN/1.73M2 — LOW
GAS PNL BLDV: 138 MMOL/L — SIGNIFICANT CHANGE UP (ref 136–145)
GAS PNL BLDV: SIGNIFICANT CHANGE UP
GLUCOSE BLDC GLUCOMTR-MCNC: 139 MG/DL — HIGH (ref 70–99)
GLUCOSE BLDC GLUCOMTR-MCNC: 207 MG/DL — HIGH (ref 70–99)
GLUCOSE BLDC GLUCOMTR-MCNC: 210 MG/DL — HIGH (ref 70–99)
GLUCOSE BLDC GLUCOMTR-MCNC: 224 MG/DL — HIGH (ref 70–99)
GLUCOSE BLDV-MCNC: 140 MG/DL — HIGH (ref 70–99)
GLUCOSE SERPL-MCNC: 136 MG/DL — HIGH (ref 70–99)
HCO3 BLDV-SCNC: 36 MMOL/L — HIGH (ref 22–29)
HCT VFR BLD CALC: 37.2 % — SIGNIFICANT CHANGE UP (ref 34.5–45)
HCT VFR BLDA CALC: 35 % — SIGNIFICANT CHANGE UP (ref 34.5–46.5)
HGB BLD CALC-MCNC: 11.5 G/DL — LOW (ref 11.7–16.1)
HGB BLD-MCNC: 11.2 G/DL — LOW (ref 11.5–15.5)
LACTATE BLDV-MCNC: 1.6 MMOL/L — SIGNIFICANT CHANGE UP (ref 0.5–2)
MAGNESIUM SERPL-MCNC: 2 MG/DL — SIGNIFICANT CHANGE UP (ref 1.6–2.6)
MCHC RBC-ENTMCNC: 27.7 PG — SIGNIFICANT CHANGE UP (ref 27–34)
MCHC RBC-ENTMCNC: 30.1 GM/DL — LOW (ref 32–36)
MCV RBC AUTO: 91.9 FL — SIGNIFICANT CHANGE UP (ref 80–100)
NRBC # BLD: 0 /100 WBCS — SIGNIFICANT CHANGE UP (ref 0–0)
PCO2 BLDV: 70 MMHG — HIGH (ref 39–42)
PH BLDV: 7.32 — SIGNIFICANT CHANGE UP (ref 7.32–7.43)
PHOSPHATE SERPL-MCNC: 3.6 MG/DL — SIGNIFICANT CHANGE UP (ref 2.5–4.5)
PLATELET # BLD AUTO: 227 K/UL — SIGNIFICANT CHANGE UP (ref 150–400)
PO2 BLDV: 66 MMHG — HIGH (ref 25–45)
POTASSIUM BLDV-SCNC: 4.2 MMOL/L — SIGNIFICANT CHANGE UP (ref 3.5–5.1)
POTASSIUM SERPL-MCNC: 4.4 MMOL/L — SIGNIFICANT CHANGE UP (ref 3.5–5.3)
POTASSIUM SERPL-SCNC: 4.4 MMOL/L — SIGNIFICANT CHANGE UP (ref 3.5–5.3)
RBC # BLD: 4.05 M/UL — SIGNIFICANT CHANGE UP (ref 3.8–5.2)
RBC # FLD: 15.4 % — HIGH (ref 10.3–14.5)
SAO2 % BLDV: 91.2 % — HIGH (ref 67–88)
SODIUM SERPL-SCNC: 140 MMOL/L — SIGNIFICANT CHANGE UP (ref 135–145)
WBC # BLD: 8.87 K/UL — SIGNIFICANT CHANGE UP (ref 3.8–10.5)
WBC # FLD AUTO: 8.87 K/UL — SIGNIFICANT CHANGE UP (ref 3.8–10.5)

## 2022-10-14 PROCEDURE — 99233 SBSQ HOSP IP/OBS HIGH 50: CPT | Mod: GC

## 2022-10-14 RX ADMIN — Medication 3 MILLILITER(S): at 23:28

## 2022-10-14 RX ADMIN — Medication 3 MILLILITER(S): at 07:40

## 2022-10-14 RX ADMIN — OXYCODONE HYDROCHLORIDE 10 MILLIGRAM(S): 5 TABLET ORAL at 03:10

## 2022-10-14 RX ADMIN — NYSTATIN CREAM 1 APPLICATION(S): 100000 CREAM TOPICAL at 21:47

## 2022-10-14 RX ADMIN — Medication 40 MILLIGRAM(S): at 07:40

## 2022-10-14 RX ADMIN — INSULIN GLARGINE 40 UNIT(S): 100 INJECTION, SOLUTION SUBCUTANEOUS at 21:44

## 2022-10-14 RX ADMIN — HEPARIN SODIUM 5000 UNIT(S): 5000 INJECTION INTRAVENOUS; SUBCUTANEOUS at 07:40

## 2022-10-14 RX ADMIN — Medication 1 APPLICATION(S): at 12:34

## 2022-10-14 RX ADMIN — BUDESONIDE AND FORMOTEROL FUMARATE DIHYDRATE 2 PUFF(S): 160; 4.5 AEROSOL RESPIRATORY (INHALATION) at 17:22

## 2022-10-14 RX ADMIN — LORATADINE 10 MILLIGRAM(S): 10 TABLET ORAL at 11:41

## 2022-10-14 RX ADMIN — Medication 2000 UNIT(S): at 11:41

## 2022-10-14 RX ADMIN — OXYCODONE HYDROCHLORIDE 10 MILLIGRAM(S): 5 TABLET ORAL at 21:57

## 2022-10-14 RX ADMIN — Medication 3 MILLILITER(S): at 01:30

## 2022-10-14 RX ADMIN — Medication 3 MILLILITER(S): at 11:41

## 2022-10-14 RX ADMIN — HEPARIN SODIUM 5000 UNIT(S): 5000 INJECTION INTRAVENOUS; SUBCUTANEOUS at 13:30

## 2022-10-14 RX ADMIN — POLYETHYLENE GLYCOL 3350 17 GRAM(S): 17 POWDER, FOR SOLUTION ORAL at 11:41

## 2022-10-14 RX ADMIN — ONDANSETRON 4 MILLIGRAM(S): 8 TABLET, FILM COATED ORAL at 20:12

## 2022-10-14 RX ADMIN — ATORVASTATIN CALCIUM 80 MILLIGRAM(S): 80 TABLET, FILM COATED ORAL at 21:44

## 2022-10-14 RX ADMIN — Medication 2: at 11:53

## 2022-10-14 RX ADMIN — Medication 81 MILLIGRAM(S): at 11:41

## 2022-10-14 RX ADMIN — OXYCODONE HYDROCHLORIDE 60 MILLIGRAM(S): 5 TABLET ORAL at 17:20

## 2022-10-14 RX ADMIN — OXYCODONE HYDROCHLORIDE 10 MILLIGRAM(S): 5 TABLET ORAL at 22:57

## 2022-10-14 RX ADMIN — Medication 3 MILLILITER(S): at 17:22

## 2022-10-14 RX ADMIN — HEPARIN SODIUM 5000 UNIT(S): 5000 INJECTION INTRAVENOUS; SUBCUTANEOUS at 21:44

## 2022-10-14 RX ADMIN — OXYCODONE HYDROCHLORIDE 60 MILLIGRAM(S): 5 TABLET ORAL at 17:50

## 2022-10-14 RX ADMIN — OXYCODONE HYDROCHLORIDE 60 MILLIGRAM(S): 5 TABLET ORAL at 07:39

## 2022-10-14 RX ADMIN — OXYCODONE HYDROCHLORIDE 10 MILLIGRAM(S): 5 TABLET ORAL at 02:08

## 2022-10-14 RX ADMIN — NYSTATIN CREAM 1 APPLICATION(S): 100000 CREAM TOPICAL at 17:23

## 2022-10-14 RX ADMIN — Medication 1 APPLICATION(S): at 17:23

## 2022-10-14 RX ADMIN — OXYCODONE HYDROCHLORIDE 60 MILLIGRAM(S): 5 TABLET ORAL at 08:09

## 2022-10-14 RX ADMIN — Medication 2: at 16:31

## 2022-10-14 RX ADMIN — BUDESONIDE AND FORMOTEROL FUMARATE DIHYDRATE 2 PUFF(S): 160; 4.5 AEROSOL RESPIRATORY (INHALATION) at 07:41

## 2022-10-14 RX ADMIN — NYSTATIN CREAM 1 APPLICATION(S): 100000 CREAM TOPICAL at 07:41

## 2022-10-14 NOTE — PHYSICAL THERAPY INITIAL EVALUATION ADULT - PERTINENT HX OF CURRENT PROBLEM, REHAB EVAL
66F w/ PMHx morbid obesity, moderate persistant asthma, HTN, HLD, DM, CKD3, HFpEF on 4-6L NC oxygen at baseline (not on BiPAP/CPAP qhs), presenting with SOB/LEGER c/f HF exacerbation vs. asthma exacerbation vs. PE vs. exacerbation of pHTN

## 2022-10-14 NOTE — DIETITIAN INITIAL EVALUATION ADULT - REASON INDICATOR FOR ASSESSMENT
RD consult for "Decompensated heart failure, salt and fluid restriction."  Source: pt, medical record. Chart reviewed, events noted.

## 2022-10-14 NOTE — PROGRESS NOTE ADULT - PROBLEM SELECTOR PLAN 2
Patient w/ chronic lower back and leg pain 2/2 spinal stenosis. Largely bedbound at baseline. Has oxycontin 60mg Q12H at home, per patient recently dc'ed short acting meds upon discussion w/ her internist as she did not want to feel drowsy.  - continue home oxycontin 60mg Q12H  - oxy 10mg PO Q4H PRN for breakthrough pain

## 2022-10-14 NOTE — DIETITIAN INITIAL EVALUATION ADULT - PERTINENT MEDS FT
MEDICATIONS  (STANDING):  albuterol/ipratropium for Nebulization 3 milliLiter(s) Nebulizer every 6 hours  ammonium lactate 12% Lotion 1 Application(s) Topical every 12 hours  aspirin enteric coated 81 milliGRAM(s) Oral daily  atorvastatin 80 milliGRAM(s) Oral at bedtime  budesonide 160 MICROgram(s)/formoterol 4.5 MICROgram(s) Inhaler 2 Puff(s) Inhalation two times a day  cholecalciferol 2000 Unit(s) Oral daily  dextrose 50% Injectable 25 Gram(s) IV Push once  dextrose 50% Injectable 12.5 Gram(s) IV Push once  dextrose 50% Injectable 25 Gram(s) IV Push once  dextrose Oral Gel 15 Gram(s) Oral once  furosemide   Injectable 40 milliGRAM(s) IV Push daily  glucagon  Injectable 1 milliGRAM(s) IntraMuscular once  heparin   Injectable 5000 Unit(s) SubCutaneous every 8 hours  insulin glargine Injectable (LANTUS) 40 Unit(s) SubCutaneous at bedtime  insulin lispro (ADMELOG) corrective regimen sliding scale   SubCutaneous three times a day before meals  insulin lispro (ADMELOG) corrective regimen sliding scale   SubCutaneous at bedtime  loratadine 10 milliGRAM(s) Oral daily  nystatin Powder 1 Application(s) Topical three times a day  oxyCODONE  ER Tablet 60 milliGRAM(s) Oral every 12 hours  polyethylene glycol 3350 17 Gram(s) Oral once    MEDICATIONS  (PRN):  acetaminophen     Tablet .. 650 milliGRAM(s) Oral every 6 hours PRN Temp greater or equal to 38C (100.4F), Mild Pain (1 - 3)  ondansetron    Tablet 4 milliGRAM(s) Oral every 8 hours PRN Nausea and/or Vomiting  oxyCODONE    IR 10 milliGRAM(s) Oral every 4 hours PRN Severe Pain (7 - 10)

## 2022-10-14 NOTE — DIETITIAN INITIAL EVALUATION ADULT - NS FNS DIET ORDER
Diet, Consistent Carbohydrate w/Evening Snack:   DASH/TLC {Sodium & Cholesterol Restricted} (DASH)  1000mL Fluid Restriction ([id number]) (10-12-22 @ 21:17) [Active]

## 2022-10-14 NOTE — PROGRESS NOTE ADULT - SUBJECTIVE AND OBJECTIVE BOX
Internal Medicine   Alejandro Mckeon | PGY-1    OVERNIGHT EVENTS: No acute overnight events.    SUBJECTIVE: Patient feels "better but not better" - states her fingers/joints feel less swollen and she can move more easily now, but her shortness of breath persists. Denies fevers. Pain well tolerated, persists at baseline and PRNs overnight helped.    MEDICATIONS  (STANDING):  albuterol/ipratropium for Nebulization 3 milliLiter(s) Nebulizer every 6 hours  ammonium lactate 12% Lotion 1 Application(s) Topical every 12 hours  aspirin enteric coated 81 milliGRAM(s) Oral daily  atorvastatin 80 milliGRAM(s) Oral at bedtime  budesonide 160 MICROgram(s)/formoterol 4.5 MICROgram(s) Inhaler 2 Puff(s) Inhalation two times a day  cholecalciferol 2000 Unit(s) Oral daily  dextrose 50% Injectable 25 Gram(s) IV Push once  dextrose 50% Injectable 12.5 Gram(s) IV Push once  dextrose 50% Injectable 25 Gram(s) IV Push once  dextrose Oral Gel 15 Gram(s) Oral once  furosemide   Injectable 40 milliGRAM(s) IV Push daily  glucagon  Injectable 1 milliGRAM(s) IntraMuscular once  heparin   Injectable 5000 Unit(s) SubCutaneous every 8 hours  insulin glargine Injectable (LANTUS) 40 Unit(s) SubCutaneous at bedtime  insulin lispro (ADMELOG) corrective regimen sliding scale   SubCutaneous three times a day before meals  insulin lispro (ADMELOG) corrective regimen sliding scale   SubCutaneous at bedtime  loratadine 10 milliGRAM(s) Oral daily  nystatin Powder 1 Application(s) Topical three times a day  oxyCODONE  ER Tablet 60 milliGRAM(s) Oral every 12 hours    MEDICATIONS  (PRN):  acetaminophen     Tablet .. 650 milliGRAM(s) Oral every 6 hours PRN Temp greater or equal to 38C (100.4F), Mild Pain (1 - 3)  ondansetron    Tablet 4 milliGRAM(s) Oral every 8 hours PRN Nausea and/or Vomiting  oxyCODONE    IR 10 milliGRAM(s) Oral every 4 hours PRN Severe Pain (7 - 10)    VITALS:  T(F): 97.8 (10-14-22 @ 12:15), Max: 98.8 (10-13-22 @ 16:18)  HR: 79 (10-14-22 @ 12:15) (76 - 92)  BP: 136/90 (10-14-22 @ 12:15) (119/63 - 144/70)  BP(mean): --  RR: 18 (10-14-22 @ 12:15) (18 - 19)  SpO2: 94% (10-14-22 @ 12:15) (93% - 96%)    PHYSICAL EXAM:   GENERAL: NAD, lying in bed comfortably  HEAD: Atraumatic, normocephalic  EYES: EOMI, conjunctiva and sclera clear, no nystagmus noted  ENT: Moist mucous membranes  CHEST/LUNG: Clear to auscultation bilaterally; no rales, rhonchi, wheezing, or rubs; unlabored respirations  HEART: Regular rate and rhythm; no murmurs, rubs, or gallops, normal S1/S2  ABDOMEN: Normal bowel sounds; soft, nontender, nondistended  EXTREMITIES: No clubbing, cyanosis, or edema  MSK: No gross deformities noted   Neurological: No focal deficits   SKIN: No rashes or lesions  PSYCH: Normal mood, affect     LABS:                      11.2   8.87  )-----------( 227      ( 14 Oct 2022 07:00 )             37.2     10-14  140  |  99  |  34<H>  ----------------------------<  136<H>  4.4   |  30  |  1.83<H>    Ca    9.1      14 Oct 2022 07:05  Phos  3.6     10-14  Mg     2.0     10-14    TPro  7.1  /  Alb  3.9  /  TBili  0.8  /  DBili  x   /  AST  18  /  ALT  18  /  AlkPhos  130<H>  10-13    PT/INR - ( 12 Oct 2022 18:17 )   PT: 12.5 sec;   INR: 1.09 ratio    PTT - ( 12 Oct 2022 18:17 )  PTT:26.1 sec    Blood Gas Profile w/Lytes - Venous: Performed In Lab (10-14-22 @ 06:50)    Creatinine Trend: 1.83<--, 1.78<--, 1.55<--    I&O's Summary  13 Oct 2022 07:01  -  14 Oct 2022 07:00  --------------------------------------------------------  IN: 0 mL / OUT: 700 mL / NET: -700 mL    14 Oct 2022 07:01  -  14 Oct 2022 13:17  --------------------------------------------------------  IN: 600 mL / OUT: 1000 mL / NET: -400 mL    Blood Gas Profile w/Marileetes - Venous: Performed In Lab (10-14-22 @ 06:50)

## 2022-10-14 NOTE — DIETITIAN INITIAL EVALUATION ADULT - ADD RECOMMEND
1) Continue DASH, Consistent Carbohydrate diet - fluid restriction per team.    2) Monitor PO intake, GI tolerance, skin integrity, labs, weight, and bowel movement regularity. Consider increase in bowel regimen for reported constipation.  3) Honor food preferences as feasible. Assist with meals PRN and encourage PO intake.  4) RD remains available upon request and will follow-up per protocol.  5) BMI >40 alert placed in chart

## 2022-10-14 NOTE — DIETITIAN INITIAL EVALUATION ADULT - NUTRITIONGOAL OUTCOME1
Pt to work towards IBW by means of adherence to calorie-controlled diet and physical activity as allowed/appropriate.

## 2022-10-14 NOTE — PROGRESS NOTE ADULT - ASSESSMENT
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  No need for further wound care at this time.  Recommend continued to keep his precautions and use of Z flow boots  Podiatry follow-up when necessary

## 2022-10-14 NOTE — PHYSICAL THERAPY INITIAL EVALUATION ADULT - ADDITIONAL COMMENTS
Prior to admission patient required assist with functional mobility and ADLs. Pt lives in a PH with her son who assists her with functional mobility and ADLs, pt able to ambulate short distances with a RW.

## 2022-10-14 NOTE — DIETITIAN INITIAL EVALUATION ADULT - NSFNSGIIOFT_GEN_A_CORE
Denies nausea, vomiting, diarrhea. Reports she has chronic nausea for which she takes zofran, and chronic constipation. Reports last BM 4 days ago per pt. Pt currently on bowel regimen (miralax).

## 2022-10-14 NOTE — DIETITIAN NUTRITION RISK NOTIFICATION - TREATMENT: THE FOLLOWING DIET HAS BEEN RECOMMENDED
Diet, Consistent Carbohydrate w/Evening Snack:   DASH/TLC {Sodium & Cholesterol Restricted} (DASH)  1000mL Fluid Restriction ([id number]) (10-12-22 @ 21:17) [Active]

## 2022-10-14 NOTE — DIETITIAN INITIAL EVALUATION ADULT - ORAL INTAKE PTA/DIET HISTORY
Pt reports good appetite/PO intake PTA, was following a diabetic, low sodium, fluid restricted diet PTA.   Confirms NKFA. Noted pt with latex allergy as per chart - pt denies having allergy to foods from the list of "latex-reactive foods".

## 2022-10-14 NOTE — DIETITIAN INITIAL EVALUATION ADULT - OTHER CALCULATIONS
Fluid needs deferred to team. Energy, protein needs based on upper IBW: 126.5 lbs/57.3 kg with consideration for BMI >40, +4 edema, and HF.

## 2022-10-14 NOTE — DIETITIAN INITIAL EVALUATION ADULT - REASON
Nutrition-focused physical examination deferred at this time - pt reporting good PO intake in-house and PTA. No overt signs of fat/muscle wasting visually observed.

## 2022-10-14 NOTE — PROGRESS NOTE ADULT - PROBLEM SELECTOR PLAN 1
Patient presenting w/ dyspnea and hypoxemia despite supplemental O2, noted to be anasarcic by family and outpatient providers. Reports increased wheezing. Hx notable for asthma, HF, limited mobility. Consider HF exacerbation vs. asthma exacerbation vs. PE vs. exacerbation of pHTN. D-dimer 339, wnl for patient's age, low suspicion for PE.  - last TTE 11/2020, EF 70%, suboptimal image quality  - trops peaked 10/13  - BL 3-4L O2 at home, does not use CPAP/BiPAP qhs, uses daily nebulizer, not always compliant with Advair pump  - CXR clear, proBNP elevated to 1174, however improved from January 2022 1329  - noted to have pHTN at [facility] on 11/2020  - Repeat TTE  - lasix 40mg IV QD  - trial duonebs, hold off on steroids for now  - continue singulair (therapeutic interchange for Advair)  - Daily weights, strict I/O, fluid restrict Patient presenting w/ dyspnea and hypoxemia despite supplemental O2, noted to be anasarcic by family and outpatient providers. Reports increased wheezing. Hx notable for asthma, HF, limited mobility. Consider HF exacerbation vs. asthma exacerbation vs. PE vs. exacerbation of pHTN. D-dimer 339, wnl for patient's age, low suspicion for PE.  - last TTE 11/2020, EF 70%, suboptimal image quality  - CXR clear, proBNP elevated to 1174, however improved from January 2022 1329  - noted to have pHTN at [facility] on 11/2020  - trops peaked 10/13, will stop trending  - BL 3-4L O2 at home, does not use CPAP/BiPAP qhs, uses daily nebulizer, not always compliant with Advair pump    - curbsided pulm for BIPAP to help with hypercarbia and sleep, will start BIPAP 12/5 QHS and titrate up as tolerated  - repeat TTE, check LE duplex to r/o DVT  - lasix 40mg IV QD for diuresis, patient's dyspnea has not yet improved but anasarca subjectively improved  - trial duonebs, hold off on steroids for now  - continue singulair (therapeutic interchange for Advair)  - Daily bed weights, strict I/O, fluid restrict

## 2022-10-14 NOTE — PROGRESS NOTE ADULT - PROBLEM SELECTOR PLAN 8
Diet: DASH/CC  DVT PPx: subQ heparin  Dispo: Pending medical course  GOC: Patient states she has advanced directives/will at home. DNR with trial of intubation. Patient's son to bring in paperwork from home

## 2022-10-14 NOTE — DIETITIAN INITIAL EVALUATION ADULT - REASON FOR ADMISSION
Heart failure    "66F with PMH morbid obesity, moderate persistant asthma, HTN, HLD, DM, CKD3, HFpEF on 4-6L NC oxygen at baseline (not on BiPAP/CPAP qhs), presenting with SOB/LEGER c/f HF exacerbation vs. asthma exacerbation vs. PE vs. exacerbation of pHTN"

## 2022-10-14 NOTE — DIETITIAN INITIAL EVALUATION ADULT - PERSON TAUGHT/METHOD
Provided verbal and written education on heart healthy nutrition for people with diabetes. Emphasis on pairing carbohydrates with protein for glycemic control; portion sizes; choosing whole grains vs refined carbohydrates; limiting sodium and fluid intake. Provided Heart-Healthy Consistent Carbohydrate Nutrition Therapy handout and Heart Failure Nutrition Therapy handout. Good comprehension noted. Pt made aware RD to remain available for any questions./verbal instruction/written material/patient instructed

## 2022-10-14 NOTE — DIETITIAN INITIAL EVALUATION ADULT - PERTINENT LABORATORY DATA
10-14    140  |  99  |  34<H>  ----------------------------<  136<H>  4.4   |  30  |  1.83<H>    Ca    9.1      14 Oct 2022 07:05  Phos  3.6     10-14  Mg     2.0     10-14    TPro  7.1  /  Alb  3.9  /  TBili  0.8  /  DBili  x   /  AST  18  /  ALT  18  /  AlkPhos  130<H>  10-13    CAPILLARY BLOOD GLUCOSE  POCT Blood Glucose.: 139 mg/dL (14 Oct 2022 07:30)  POCT Blood Glucose.: 197 mg/dL (13 Oct 2022 21:15)  POCT Blood Glucose.: 148 mg/dL (13 Oct 2022 17:27)  POCT Blood Glucose.: 198 mg/dL (13 Oct 2022 16:19)  POCT Blood Glucose.: 171 mg/dL (13 Oct 2022 11:37)    A1C with Estimated Average Glucose Result: 7.3 % (10-13-22 @ 11:47)

## 2022-10-14 NOTE — PROGRESS NOTE ADULT - ASSESSMENT
66F with PMH morbid obesity, moderate persistant asthma, HTN, HLD, DM, CKD3, HFpEF on 4-6L NC oxygen at baseline (not on BiPAP/CPAP qhs), presenting with SOB/LEGER c/f HF exacerbation vs. asthma exacerbation vs. PE vs. exacerbation of pHTN 66F with PMH morbid obesity, moderate persistant asthma, HTN, HLD, DM, CKD3, HFpEF on 4-6L NC oxygen at baseline (not on BiPAP/CPAP qhs), presenting with SOB/LEGER most consistent w/ heart failure exacerbation 66F with PMH morbid obesity, moderate persistant asthma, HTN, HLD, DM, CKD3, HFpEF on 4-6L NC oxygen at baseline (not on BiPAP/CPAP qhs), presenting with SOB/LEGER most consistent w/ heart failure exacerbation.

## 2022-10-14 NOTE — DIETITIAN INITIAL EVALUATION ADULT - FLUID ACCUMULATION
Per chart: +4 generalized and bilateral legs V-Y Flap Text: The defect edges were debeveled with a #15 scalpel blade.  Given the location of the defect, shape of the defect and the proximity to free margins a V-Y flap was deemed most appropriate.  Using a sterile surgical marker, an appropriate advancement flap was drawn incorporating the defect and placing the expected incisions within the relaxed skin tension lines where possible.    The area thus outlined was incised deep to adipose tissue with a #15 scalpel blade.  The skin margins were undermined to an appropriate distance in all directions utilizing iris scissors.

## 2022-10-14 NOTE — PROGRESS NOTE ADULT - PROBLEM SELECTOR PLAN 4
- On Toujeo 80 units AM, 84 units PM + Novolog 20 qac at home  - c/w Lantus at reduced dose to 40 qhs for now, titrate as needed  - Hold premeal insulin for now  - Low dose correctional scale

## 2022-10-14 NOTE — DIETITIAN INITIAL EVALUATION ADULT - NSFNSADHERENCEPTAFT_GEN_A_CORE
Pt noted with hx of DM2. Reports taking novolog and toujeo PTA. Reports cheking fingersticks 1-2x/day, with typical readings 70->200 mg/dl. Pt states she in interested in having a continuous glucose monitor. A1c 7.3% indicates fair glycemic control through ?accuracy secondary to CKD3.

## 2022-10-14 NOTE — PROGRESS NOTE ADULT - ATTENDING COMMENTS
Patient seen and evaluated. Feels hands are less tight but still tightness in thighs, abdomen. Still SoB and hypoxic with minimal movement.   Labs reviewed, slight worsening in creatinine, borderline acidosis with elevated PCO2 on VBG      #Acute decompensated heart failure  -continue with lasix 40mg IV BID  -primafit for I/O, obtain weight here though do not have baseline.  Per patient put out 3L yesterday, was not what was documented. Weights unreliable as bed weights.   -TTE to see if can get an approximate EF.  -repeat bicarbonate 30, VBG trending towards acidosis with elevated PCO2. Given anasarca my suspicion is biventricular failure with cor pulmonale and severe pulm HTN being primary culprit of current anasarca.   -given pH 7.32, will hold off acetazolamide at this time.     #Hypoxic respiratory failure  -treatment as above.   -in addition given elevated bicarbonate, elevated CO2, in addition to hx of snoring, daytime solmelence, obesity, suspect obesity-hyperventilation syndrome, though did not tolerate her sleep study previously.   -spoke to pulmonology, despite weight, will start at 12/5 and monitor response with BIPAP.    #CKD  -baseline Cre ~1.6, up to 1.8  -acceptable rise in effort for diuresis.  -monitor.     #Chronic pain  -continue oxycontin 60mg BID *home medication)  -continue oxycodone 10mg q4hr for mod-severe pain, hold for sedation.     #Morbid obesity  -nutrition consult.   -after acute issues can discuss with outpatient provider GLP-1.    #Elevated D-dlimer  -sent overnight, age-adjusted normal. givne subacute nature of SOB, have low suspcion for PE, is normal for age adjusted Dimer. However will check duplex LE to r/o clot.

## 2022-10-14 NOTE — DIETITIAN INITIAL EVALUATION ADULT - OTHER INFO
Pt unsure of UBW but denies dry wt changes, only changes in wt secondary to fluids shifts.  Dosing wt: 380 lbs (10-12)  Wt history per chart: 457.9 lbs (10-14), 451 lbs (10-13). RD to continue to monitor weight trends as able/available.     Per chart, pt currently ordered for lantus, admelog SSI, Vitamin D3, IV lasix, and atorvastatin in-house.

## 2022-10-14 NOTE — PROGRESS NOTE ADULT - PROBLEM SELECTOR PLAN 3
- Cr 1.55->1.78, BL ~1.6  - K elevated to 5.7 on admission, now improved  - s/p Lasix 80 IV x1, Lokelma x1 in ED  - EKG unremarkable  - I/O as above  - Avoid nephrotoxins, dose per GFR - Cr 1.55->1.83, BL ~1.6  - K elevated to 5.7 on admission, now improved  - s/p Lasix 80 IV x1, Lokelma x1 in ED  - EKG unremarkable  - I/O as above  - Avoid nephrotoxins, dose per GFR

## 2022-10-15 LAB
ALBUMIN SERPL ELPH-MCNC: 3.5 G/DL — SIGNIFICANT CHANGE UP (ref 3.3–5)
ALP SERPL-CCNC: 117 U/L — SIGNIFICANT CHANGE UP (ref 40–120)
ALT FLD-CCNC: 13 U/L — SIGNIFICANT CHANGE UP (ref 10–45)
ANION GAP SERPL CALC-SCNC: 10 MMOL/L — SIGNIFICANT CHANGE UP (ref 5–17)
AST SERPL-CCNC: 13 U/L — SIGNIFICANT CHANGE UP (ref 10–40)
BASE EXCESS BLDA CALC-SCNC: 7.4 MMOL/L — HIGH (ref -2–3)
BILIRUB SERPL-MCNC: 0.8 MG/DL — SIGNIFICANT CHANGE UP (ref 0.2–1.2)
BUN SERPL-MCNC: 38 MG/DL — HIGH (ref 7–23)
CALCIUM SERPL-MCNC: 9.1 MG/DL — SIGNIFICANT CHANGE UP (ref 8.4–10.5)
CHLORIDE SERPL-SCNC: 97 MMOL/L — SIGNIFICANT CHANGE UP (ref 96–108)
CO2 BLDA-SCNC: 38 MMOL/L — HIGH (ref 19–24)
CO2 SERPL-SCNC: 31 MMOL/L — SIGNIFICANT CHANGE UP (ref 22–31)
CREAT SERPL-MCNC: 1.77 MG/DL — HIGH (ref 0.5–1.3)
EGFR: 31 ML/MIN/1.73M2 — LOW
GAS PNL BLDA: SIGNIFICANT CHANGE UP
GLUCOSE BLDC GLUCOMTR-MCNC: 175 MG/DL — HIGH (ref 70–99)
GLUCOSE BLDC GLUCOMTR-MCNC: 209 MG/DL — HIGH (ref 70–99)
GLUCOSE BLDC GLUCOMTR-MCNC: 218 MG/DL — HIGH (ref 70–99)
GLUCOSE BLDC GLUCOMTR-MCNC: 259 MG/DL — HIGH (ref 70–99)
GLUCOSE SERPL-MCNC: 260 MG/DL — HIGH (ref 70–99)
HCO3 BLDA-SCNC: 36 MMOL/L — HIGH (ref 21–28)
HOROWITZ INDEX BLDA+IHG-RTO: 40 — SIGNIFICANT CHANGE UP
MAGNESIUM SERPL-MCNC: 1.9 MG/DL — SIGNIFICANT CHANGE UP (ref 1.6–2.6)
PCO2 BLDA: 70 MMHG — CRITICAL HIGH (ref 32–45)
PH BLDA: 7.32 — LOW (ref 7.35–7.45)
PHOSPHATE SERPL-MCNC: 3.9 MG/DL — SIGNIFICANT CHANGE UP (ref 2.5–4.5)
PO2 BLDA: 76 MMHG — LOW (ref 83–108)
POTASSIUM SERPL-MCNC: 4.7 MMOL/L — SIGNIFICANT CHANGE UP (ref 3.5–5.3)
POTASSIUM SERPL-SCNC: 4.7 MMOL/L — SIGNIFICANT CHANGE UP (ref 3.5–5.3)
PROT SERPL-MCNC: 6.6 G/DL — SIGNIFICANT CHANGE UP (ref 6–8.3)
SAO2 % BLDA: 94.2 % — SIGNIFICANT CHANGE UP (ref 94–98)
SODIUM SERPL-SCNC: 138 MMOL/L — SIGNIFICANT CHANGE UP (ref 135–145)

## 2022-10-15 PROCEDURE — 99233 SBSQ HOSP IP/OBS HIGH 50: CPT | Mod: GC

## 2022-10-15 RX ORDER — MAGNESIUM SULFATE 500 MG/ML
1 VIAL (ML) INJECTION ONCE
Refills: 0 | Status: COMPLETED | OUTPATIENT
Start: 2022-10-15 | End: 2022-10-15

## 2022-10-15 RX ORDER — KETOTIFEN FUMARATE 0.34 MG/ML
1 SOLUTION OPHTHALMIC
Refills: 0 | Status: DISCONTINUED | OUTPATIENT
Start: 2022-10-15 | End: 2022-10-26

## 2022-10-15 RX ORDER — POLYETHYLENE GLYCOL 3350 17 G/17G
17 POWDER, FOR SOLUTION ORAL DAILY
Refills: 0 | Status: DISCONTINUED | OUTPATIENT
Start: 2022-10-15 | End: 2022-10-16

## 2022-10-15 RX ORDER — INSULIN LISPRO 100/ML
2 VIAL (ML) SUBCUTANEOUS
Refills: 0 | Status: DISCONTINUED | OUTPATIENT
Start: 2022-10-15 | End: 2022-10-16

## 2022-10-15 RX ADMIN — OXYCODONE HYDROCHLORIDE 60 MILLIGRAM(S): 5 TABLET ORAL at 06:22

## 2022-10-15 RX ADMIN — OXYCODONE HYDROCHLORIDE 60 MILLIGRAM(S): 5 TABLET ORAL at 17:10

## 2022-10-15 RX ADMIN — Medication 2: at 17:08

## 2022-10-15 RX ADMIN — HEPARIN SODIUM 5000 UNIT(S): 5000 INJECTION INTRAVENOUS; SUBCUTANEOUS at 05:22

## 2022-10-15 RX ADMIN — Medication 3 MILLILITER(S): at 05:21

## 2022-10-15 RX ADMIN — Medication 81 MILLIGRAM(S): at 12:20

## 2022-10-15 RX ADMIN — Medication 100 GRAM(S): at 21:01

## 2022-10-15 RX ADMIN — Medication 2: at 12:21

## 2022-10-15 RX ADMIN — Medication 2 UNIT(S): at 12:21

## 2022-10-15 RX ADMIN — Medication 3 MILLILITER(S): at 17:09

## 2022-10-15 RX ADMIN — OXYCODONE HYDROCHLORIDE 10 MILLIGRAM(S): 5 TABLET ORAL at 21:17

## 2022-10-15 RX ADMIN — KETOTIFEN FUMARATE 1 DROP(S): 0.34 SOLUTION OPHTHALMIC at 20:57

## 2022-10-15 RX ADMIN — HEPARIN SODIUM 5000 UNIT(S): 5000 INJECTION INTRAVENOUS; SUBCUTANEOUS at 13:19

## 2022-10-15 RX ADMIN — HEPARIN SODIUM 5000 UNIT(S): 5000 INJECTION INTRAVENOUS; SUBCUTANEOUS at 21:10

## 2022-10-15 RX ADMIN — Medication 1: at 21:08

## 2022-10-15 RX ADMIN — Medication 3 MILLILITER(S): at 12:24

## 2022-10-15 RX ADMIN — NYSTATIN CREAM 1 APPLICATION(S): 100000 CREAM TOPICAL at 05:22

## 2022-10-15 RX ADMIN — LORATADINE 10 MILLIGRAM(S): 10 TABLET ORAL at 12:20

## 2022-10-15 RX ADMIN — INSULIN GLARGINE 40 UNIT(S): 100 INJECTION, SOLUTION SUBCUTANEOUS at 21:09

## 2022-10-15 RX ADMIN — OXYCODONE HYDROCHLORIDE 60 MILLIGRAM(S): 5 TABLET ORAL at 05:22

## 2022-10-15 RX ADMIN — OXYCODONE HYDROCHLORIDE 10 MILLIGRAM(S): 5 TABLET ORAL at 14:46

## 2022-10-15 RX ADMIN — BUDESONIDE AND FORMOTEROL FUMARATE DIHYDRATE 2 PUFF(S): 160; 4.5 AEROSOL RESPIRATORY (INHALATION) at 17:14

## 2022-10-15 RX ADMIN — NYSTATIN CREAM 1 APPLICATION(S): 100000 CREAM TOPICAL at 13:38

## 2022-10-15 RX ADMIN — Medication 1 APPLICATION(S): at 17:10

## 2022-10-15 RX ADMIN — NYSTATIN CREAM 1 APPLICATION(S): 100000 CREAM TOPICAL at 21:10

## 2022-10-15 RX ADMIN — Medication 40 MILLIGRAM(S): at 05:23

## 2022-10-15 RX ADMIN — Medication 2 UNIT(S): at 17:07

## 2022-10-15 RX ADMIN — ATORVASTATIN CALCIUM 80 MILLIGRAM(S): 80 TABLET, FILM COATED ORAL at 21:10

## 2022-10-15 RX ADMIN — Medication 1 APPLICATION(S): at 05:22

## 2022-10-15 RX ADMIN — Medication 2000 UNIT(S): at 12:20

## 2022-10-15 RX ADMIN — Medication 1: at 08:26

## 2022-10-15 RX ADMIN — OXYCODONE HYDROCHLORIDE 10 MILLIGRAM(S): 5 TABLET ORAL at 22:17

## 2022-10-15 RX ADMIN — BUDESONIDE AND FORMOTEROL FUMARATE DIHYDRATE 2 PUFF(S): 160; 4.5 AEROSOL RESPIRATORY (INHALATION) at 05:23

## 2022-10-15 NOTE — PROGRESS NOTE ADULT - PROBLEM SELECTOR PLAN 1
Patient presenting w/ dyspnea and hypoxemia despite supplemental O2, noted to be anasarcic by family and outpatient providers. Reports increased wheezing. Hx notable for asthma, HF, limited mobility. Consider HF exacerbation vs. asthma exacerbation vs. PE vs. exacerbation of pHTN. D-dimer 339, wnl for patient's age, low suspicion for PE.  - last TTE 11/2020, EF 70%, suboptimal image quality  - CXR clear, proBNP elevated to 1174, however improved from January 2022 1329  - noted to have pHTN at [facility] on 11/2020  - trops peaked 10/13, will stop trending  - BL 3-4L O2 at home, does not use CPAP/BiPAP qhs, uses daily nebulizer, not always compliant with Advair pump    - curbsided pulm for BIPAP to help with hypercarbia and sleep, will start BIPAP 12/5 QHS and titrate up as tolerated  - repeat TTE, check LE duplex to r/o DVT  - lasix 40mg IV QD for diuresis, patient's dyspnea has not yet improved but anasarca subjectively improved  - trial duonebs, hold off on steroids for now  - continue singulair (therapeutic interchange for Advair)  - Daily bed weights, strict I/O, fluid restrict

## 2022-10-15 NOTE — PROGRESS NOTE ADULT - ASSESSMENT
66F with PMH morbid obesity, moderate persistant asthma, HTN, HLD, DM, CKD3, HFpEF on 4-6L NC oxygen at baseline (not on BiPAP/CPAP qhs), presenting with SOB/LEGER most consistent w/ heart failure exacerbation.

## 2022-10-15 NOTE — PROGRESS NOTE ADULT - SUBJECTIVE AND OBJECTIVE BOX
Internal Medicine   Aubree Gaston | PGY-2    OVERNIGHT EVENTS:      SUBJECTIVE:       MEDICATIONS  (STANDING):  albuterol/ipratropium for Nebulization 3 milliLiter(s) Nebulizer every 6 hours  ammonium lactate 12% Lotion 1 Application(s) Topical every 12 hours  aspirin enteric coated 81 milliGRAM(s) Oral daily  atorvastatin 80 milliGRAM(s) Oral at bedtime  budesonide 160 MICROgram(s)/formoterol 4.5 MICROgram(s) Inhaler 2 Puff(s) Inhalation two times a day  cholecalciferol 2000 Unit(s) Oral daily  dextrose 50% Injectable 25 Gram(s) IV Push once  dextrose 50% Injectable 12.5 Gram(s) IV Push once  dextrose 50% Injectable 25 Gram(s) IV Push once  dextrose Oral Gel 15 Gram(s) Oral once  furosemide   Injectable 40 milliGRAM(s) IV Push daily  glucagon  Injectable 1 milliGRAM(s) IntraMuscular once  heparin   Injectable 5000 Unit(s) SubCutaneous every 8 hours  insulin glargine Injectable (LANTUS) 40 Unit(s) SubCutaneous at bedtime  insulin lispro (ADMELOG) corrective regimen sliding scale   SubCutaneous three times a day before meals  insulin lispro (ADMELOG) corrective regimen sliding scale   SubCutaneous at bedtime  loratadine 10 milliGRAM(s) Oral daily  nystatin Powder 1 Application(s) Topical three times a day  oxyCODONE  ER Tablet 60 milliGRAM(s) Oral every 12 hours    MEDICATIONS  (PRN):  acetaminophen     Tablet .. 650 milliGRAM(s) Oral every 6 hours PRN Temp greater or equal to 38C (100.4F), Mild Pain (1 - 3)  ondansetron    Tablet 4 milliGRAM(s) Oral every 8 hours PRN Nausea and/or Vomiting  oxyCODONE    IR 10 milliGRAM(s) Oral every 4 hours PRN Severe Pain (7 - 10)        T(F): 98.3 (10-15-22 @ 05:20), Max: 98.3 (10-15-22 @ 05:20)  HR: 81 (10-15-22 @ 05:20) (75 - 81)  BP: 134/65 (10-15-22 @ 05:20) (134/65 - 160/68)  BP(mean): --  RR: 18 (10-15-22 @ 05:20) (18 - 18)  SpO2: 95% (10-15-22 @ 05:20) (94% - 96%)    PHYSICAL EXAM:     GENERAL: NAD, lying in bed comfortably  HEAD:  Atraumatic, Normocephalic  EYES: EOMI, PERRLA, conjunctiva and sclera clear, no nystagmus noted  ENT: Moist mucous membranes,   NECK: Supple, No JVD, trachea midline  CHEST/LUNG: Clear to auscultation bilaterally; No rales, rhonchi, wheezing, or rubs. Unlabored respirations  HEART: Regular rate and rhythm; No murmurs, rubs, or gallops, normal S1/S2  ABDOMEN: normal bowel sounds; Soft, nontender, nondistended, no organomegaly   EXTREMITIES:  2+ Peripheral Pulses, brisk capillary refill. No clubbing, cyanosis, or edema  MSK: No gross deformities noted   Neurological:  A&Ox3, no focal deficits   SKIN: No rashes or lesions  PSYCH: Normal mood, affect     TELEMETRY:    LABS:                        11.2   8.87  )-----------( 227      ( 14 Oct 2022 07:00 )             37.2     10-14    140  |  99  |  34<H>  ----------------------------<  136<H>  4.4   |  30  |  1.83<H>    Ca    9.1      14 Oct 2022 07:05  Phos  3.6     10-14  Mg     2.0     10-14    TPro  7.1  /  Alb  3.9  /  TBili  0.8  /  DBili  x   /  AST  18  /  ALT  18  /  AlkPhos  130<H>  10-13            Creatinine Trend: 1.83<--, 1.78<--, 1.55<--  I&O's Summary    14 Oct 2022 07:01  -  15 Oct 2022 07:00  --------------------------------------------------------  IN: 1080 mL / OUT: 2200 mL / NET: -1120 mL      BNP    RADIOLOGY & ADDITIONAL STUDIES:             Internal Medicine   Lenyhsagrario Gaston | PGY-2    OVERNIGHT EVENTS: No overnight events  Tele: NSR 70-90      SUBJECTIVE:  Patient feels that dyspnea is improved and less volume overloaded but still having dyspnea. Not tolerating bipap overnight      MEDICATIONS  (STANDING):  albuterol/ipratropium for Nebulization 3 milliLiter(s) Nebulizer every 6 hours  ammonium lactate 12% Lotion 1 Application(s) Topical every 12 hours  aspirin enteric coated 81 milliGRAM(s) Oral daily  atorvastatin 80 milliGRAM(s) Oral at bedtime  budesonide 160 MICROgram(s)/formoterol 4.5 MICROgram(s) Inhaler 2 Puff(s) Inhalation two times a day  cholecalciferol 2000 Unit(s) Oral daily  dextrose 50% Injectable 25 Gram(s) IV Push once  dextrose 50% Injectable 12.5 Gram(s) IV Push once  dextrose 50% Injectable 25 Gram(s) IV Push once  dextrose Oral Gel 15 Gram(s) Oral once  furosemide   Injectable 40 milliGRAM(s) IV Push daily  glucagon  Injectable 1 milliGRAM(s) IntraMuscular once  heparin   Injectable 5000 Unit(s) SubCutaneous every 8 hours  insulin glargine Injectable (LANTUS) 40 Unit(s) SubCutaneous at bedtime  insulin lispro (ADMELOG) corrective regimen sliding scale   SubCutaneous three times a day before meals  insulin lispro (ADMELOG) corrective regimen sliding scale   SubCutaneous at bedtime  loratadine 10 milliGRAM(s) Oral daily  nystatin Powder 1 Application(s) Topical three times a day  oxyCODONE  ER Tablet 60 milliGRAM(s) Oral every 12 hours    MEDICATIONS  (PRN):  acetaminophen     Tablet .. 650 milliGRAM(s) Oral every 6 hours PRN Temp greater or equal to 38C (100.4F), Mild Pain (1 - 3)  ondansetron    Tablet 4 milliGRAM(s) Oral every 8 hours PRN Nausea and/or Vomiting  oxyCODONE    IR 10 milliGRAM(s) Oral every 4 hours PRN Severe Pain (7 - 10)        T(F): 98.3 (10-15-22 @ 05:20), Max: 98.3 (10-15-22 @ 05:20)  HR: 81 (10-15-22 @ 05:20) (75 - 81)  BP: 134/65 (10-15-22 @ 05:20) (134/65 - 160/68)  BP(mean): --  RR: 18 (10-15-22 @ 05:20) (18 - 18)  SpO2: 95% (10-15-22 @ 05:20) (94% - 96%)    PHYSICAL EXAM:     GENERAL: NAD, lying in bed comfortably  HEAD: Atraumatic, normocephalic  EYES: EOMI, conjunctiva and sclera clear, no nystagmus noted  ENT: Moist mucous membranes  CHEST/LUNG: Clear to auscultation bilaterally; no rales, rhonchi, wheezing, or rubs; unlabored respirations  HEART: Regular rate and rhythm; no murmurs, rubs, or gallops, normal S1/S2  ABDOMEN: Normal bowel sounds; soft, nontender, nondistended  EXTREMITIES: No clubbing, cyanosis, or edema  MSK: No gross deformities noted   Neurological: No focal deficits   SKIN: No rashes or lesions  PSYCH: Normal mood, affect     TELEMETRY:    LABS:                        11.2   8.87  )-----------( 227      ( 14 Oct 2022 07:00 )             37.2     10-14    140  |  99  |  34<H>  ----------------------------<  136<H>  4.4   |  30  |  1.83<H>    Ca    9.1      14 Oct 2022 07:05  Phos  3.6     10-14  Mg     2.0     10-14    TPro  7.1  /  Alb  3.9  /  TBili  0.8  /  DBili  x   /  AST  18  /  ALT  18  /  AlkPhos  130<H>  10-13            Creatinine Trend: 1.83<--, 1.78<--, 1.55<--  I&O's Summary    14 Oct 2022 07:01  -  15 Oct 2022 07:00  --------------------------------------------------------  IN: 1080 mL / OUT: 2200 mL / NET: -1120 mL      BNP    RADIOLOGY & ADDITIONAL STUDIES:             Internal Medicine   Lenyhsagrario Gaston | PGY-2    OVERNIGHT EVENTS: No overnight events  Tele: NSR 70-90      SUBJECTIVE:  Patient feels that dyspnea is improved and less volume overloaded but still having dyspnea. Not tolerating bipap overnight      MEDICATIONS  (STANDING):  albuterol/ipratropium for Nebulization 3 milliLiter(s) Nebulizer every 6 hours  ammonium lactate 12% Lotion 1 Application(s) Topical every 12 hours  aspirin enteric coated 81 milliGRAM(s) Oral daily  atorvastatin 80 milliGRAM(s) Oral at bedtime  budesonide 160 MICROgram(s)/formoterol 4.5 MICROgram(s) Inhaler 2 Puff(s) Inhalation two times a day  cholecalciferol 2000 Unit(s) Oral daily  dextrose 50% Injectable 25 Gram(s) IV Push once  dextrose 50% Injectable 12.5 Gram(s) IV Push once  dextrose 50% Injectable 25 Gram(s) IV Push once  dextrose Oral Gel 15 Gram(s) Oral once  furosemide   Injectable 40 milliGRAM(s) IV Push daily  glucagon  Injectable 1 milliGRAM(s) IntraMuscular once  heparin   Injectable 5000 Unit(s) SubCutaneous every 8 hours  insulin glargine Injectable (LANTUS) 40 Unit(s) SubCutaneous at bedtime  insulin lispro (ADMELOG) corrective regimen sliding scale   SubCutaneous three times a day before meals  insulin lispro (ADMELOG) corrective regimen sliding scale   SubCutaneous at bedtime  loratadine 10 milliGRAM(s) Oral daily  nystatin Powder 1 Application(s) Topical three times a day  oxyCODONE  ER Tablet 60 milliGRAM(s) Oral every 12 hours    MEDICATIONS  (PRN):  acetaminophen     Tablet .. 650 milliGRAM(s) Oral every 6 hours PRN Temp greater or equal to 38C (100.4F), Mild Pain (1 - 3)  ondansetron    Tablet 4 milliGRAM(s) Oral every 8 hours PRN Nausea and/or Vomiting  oxyCODONE    IR 10 milliGRAM(s) Oral every 4 hours PRN Severe Pain (7 - 10)        T(F): 98.3 (10-15-22 @ 05:20), Max: 98.3 (10-15-22 @ 05:20)  HR: 81 (10-15-22 @ 05:20) (75 - 81)  BP: 134/65 (10-15-22 @ 05:20) (134/65 - 160/68)  BP(mean): --  RR: 18 (10-15-22 @ 05:20) (18 - 18)  SpO2: 95% (10-15-22 @ 05:20) (94% - 96%)    PHYSICAL EXAM:     GENERAL: NAD, lying in bed comfortably  HEAD: Atraumatic, normocephalic  EYES: EOMI, conjunctiva and sclera clear, no nystagmus noted  ENT: Moist mucous membranes  CHEST/LUNG: Difficult to assess given body habitus.  HEART: Regular rate and rhythm; no murmurs, rubs, or gallops, normal S1/S2  ABDOMEN: Difficult to assess given body habitus  EXTREMITIES: Difficult to assess given body habitus  MSK: No gross deformities noted   Neurological: No focal deficits   SKIN: No rashes or lesions  PSYCH: Normal mood, affect     TELEMETRY:    LABS:                        11.2   8.87  )-----------( 227      ( 14 Oct 2022 07:00 )             37.2     10-14    140  |  99  |  34<H>  ----------------------------<  136<H>  4.4   |  30  |  1.83<H>    Ca    9.1      14 Oct 2022 07:05  Phos  3.6     10-14  Mg     2.0     10-14    TPro  7.1  /  Alb  3.9  /  TBili  0.8  /  DBili  x   /  AST  18  /  ALT  18  /  AlkPhos  130<H>  10-13            Creatinine Trend: 1.83<--, 1.78<--, 1.55<--  I&O's Summary    14 Oct 2022 07:01  -  15 Oct 2022 07:00  --------------------------------------------------------  IN: 1080 mL / OUT: 2200 mL / NET: -1120 mL      BNP    RADIOLOGY & ADDITIONAL STUDIES:

## 2022-10-15 NOTE — PROGRESS NOTE ADULT - PROBLEM SELECTOR PLAN 3
- Cr 1.55->1.83, BL ~1.6  - K elevated to 5.7 on admission, now improved  - s/p Lasix 80 IV x1, Lokelma x1 in ED  - EKG unremarkable  - I/O as above  - Avoid nephrotoxins, dose per GFR

## 2022-10-16 DIAGNOSIS — K59.00 CONSTIPATION, UNSPECIFIED: ICD-10-CM

## 2022-10-16 DIAGNOSIS — R74.8 ABNORMAL LEVELS OF OTHER SERUM ENZYMES: ICD-10-CM

## 2022-10-16 LAB
ALBUMIN SERPL ELPH-MCNC: 4 G/DL — SIGNIFICANT CHANGE UP (ref 3.3–5)
ALP SERPL-CCNC: 137 U/L — HIGH (ref 40–120)
ALT FLD-CCNC: 16 U/L — SIGNIFICANT CHANGE UP (ref 10–45)
ANION GAP SERPL CALC-SCNC: 13 MMOL/L — SIGNIFICANT CHANGE UP (ref 5–17)
AST SERPL-CCNC: 18 U/L — SIGNIFICANT CHANGE UP (ref 10–40)
BILIRUB SERPL-MCNC: 1 MG/DL — SIGNIFICANT CHANGE UP (ref 0.2–1.2)
BUN SERPL-MCNC: 37 MG/DL — HIGH (ref 7–23)
CALCIUM SERPL-MCNC: 9.4 MG/DL — SIGNIFICANT CHANGE UP (ref 8.4–10.5)
CHLORIDE SERPL-SCNC: 94 MMOL/L — LOW (ref 96–108)
CO2 SERPL-SCNC: 31 MMOL/L — SIGNIFICANT CHANGE UP (ref 22–31)
CREAT SERPL-MCNC: 1.74 MG/DL — HIGH (ref 0.5–1.3)
EGFR: 32 ML/MIN/1.73M2 — LOW
GAS PNL BLDA: SIGNIFICANT CHANGE UP
GLUCOSE BLDC GLUCOMTR-MCNC: 158 MG/DL — HIGH (ref 70–99)
GLUCOSE BLDC GLUCOMTR-MCNC: 163 MG/DL — HIGH (ref 70–99)
GLUCOSE BLDC GLUCOMTR-MCNC: 193 MG/DL — HIGH (ref 70–99)
GLUCOSE BLDC GLUCOMTR-MCNC: 257 MG/DL — HIGH (ref 70–99)
GLUCOSE SERPL-MCNC: 201 MG/DL — HIGH (ref 70–99)
MAGNESIUM SERPL-MCNC: 2 MG/DL — SIGNIFICANT CHANGE UP (ref 1.6–2.6)
PHOSPHATE SERPL-MCNC: 3.6 MG/DL — SIGNIFICANT CHANGE UP (ref 2.5–4.5)
POTASSIUM SERPL-MCNC: 4.6 MMOL/L — SIGNIFICANT CHANGE UP (ref 3.5–5.3)
POTASSIUM SERPL-SCNC: 4.6 MMOL/L — SIGNIFICANT CHANGE UP (ref 3.5–5.3)
PROT SERPL-MCNC: 7.4 G/DL — SIGNIFICANT CHANGE UP (ref 6–8.3)
SODIUM SERPL-SCNC: 138 MMOL/L — SIGNIFICANT CHANGE UP (ref 135–145)

## 2022-10-16 PROCEDURE — 99233 SBSQ HOSP IP/OBS HIGH 50: CPT | Mod: GC

## 2022-10-16 RX ORDER — SENNA PLUS 8.6 MG/1
2 TABLET ORAL AT BEDTIME
Refills: 0 | Status: DISCONTINUED | OUTPATIENT
Start: 2022-10-16 | End: 2022-10-16

## 2022-10-16 RX ORDER — POLYETHYLENE GLYCOL 3350 17 G/17G
17 POWDER, FOR SOLUTION ORAL EVERY 12 HOURS
Refills: 0 | Status: DISCONTINUED | OUTPATIENT
Start: 2022-10-16 | End: 2022-10-26

## 2022-10-16 RX ORDER — INSULIN LISPRO 100/ML
3 VIAL (ML) SUBCUTANEOUS
Refills: 0 | Status: DISCONTINUED | OUTPATIENT
Start: 2022-10-16 | End: 2022-10-21

## 2022-10-16 RX ORDER — LACTULOSE 10 G/15ML
20 SOLUTION ORAL ONCE
Refills: 0 | Status: COMPLETED | OUTPATIENT
Start: 2022-10-16 | End: 2022-10-16

## 2022-10-16 RX ADMIN — NYSTATIN CREAM 1 APPLICATION(S): 100000 CREAM TOPICAL at 13:04

## 2022-10-16 RX ADMIN — Medication 3 MILLILITER(S): at 17:23

## 2022-10-16 RX ADMIN — Medication 1: at 16:41

## 2022-10-16 RX ADMIN — KETOTIFEN FUMARATE 1 DROP(S): 0.34 SOLUTION OPHTHALMIC at 17:23

## 2022-10-16 RX ADMIN — OXYCODONE HYDROCHLORIDE 60 MILLIGRAM(S): 5 TABLET ORAL at 06:51

## 2022-10-16 RX ADMIN — Medication 1: at 22:13

## 2022-10-16 RX ADMIN — LORATADINE 10 MILLIGRAM(S): 10 TABLET ORAL at 11:12

## 2022-10-16 RX ADMIN — HEPARIN SODIUM 5000 UNIT(S): 5000 INJECTION INTRAVENOUS; SUBCUTANEOUS at 05:52

## 2022-10-16 RX ADMIN — Medication 3 MILLILITER(S): at 05:53

## 2022-10-16 RX ADMIN — Medication 3 MILLILITER(S): at 23:56

## 2022-10-16 RX ADMIN — KETOTIFEN FUMARATE 1 DROP(S): 0.34 SOLUTION OPHTHALMIC at 05:53

## 2022-10-16 RX ADMIN — OXYCODONE HYDROCHLORIDE 60 MILLIGRAM(S): 5 TABLET ORAL at 05:51

## 2022-10-16 RX ADMIN — HEPARIN SODIUM 5000 UNIT(S): 5000 INJECTION INTRAVENOUS; SUBCUTANEOUS at 22:10

## 2022-10-16 RX ADMIN — OXYCODONE HYDROCHLORIDE 60 MILLIGRAM(S): 5 TABLET ORAL at 17:55

## 2022-10-16 RX ADMIN — INSULIN GLARGINE 40 UNIT(S): 100 INJECTION, SOLUTION SUBCUTANEOUS at 22:13

## 2022-10-16 RX ADMIN — OXYCODONE HYDROCHLORIDE 10 MILLIGRAM(S): 5 TABLET ORAL at 11:10

## 2022-10-16 RX ADMIN — OXYCODONE HYDROCHLORIDE 10 MILLIGRAM(S): 5 TABLET ORAL at 16:10

## 2022-10-16 RX ADMIN — POLYETHYLENE GLYCOL 3350 17 GRAM(S): 17 POWDER, FOR SOLUTION ORAL at 00:30

## 2022-10-16 RX ADMIN — POLYETHYLENE GLYCOL 3350 17 GRAM(S): 17 POWDER, FOR SOLUTION ORAL at 11:12

## 2022-10-16 RX ADMIN — OXYCODONE HYDROCHLORIDE 10 MILLIGRAM(S): 5 TABLET ORAL at 23:18

## 2022-10-16 RX ADMIN — LACTULOSE 20 GRAM(S): 10 SOLUTION ORAL at 11:12

## 2022-10-16 RX ADMIN — Medication 2 UNIT(S): at 11:59

## 2022-10-16 RX ADMIN — BUDESONIDE AND FORMOTEROL FUMARATE DIHYDRATE 2 PUFF(S): 160; 4.5 AEROSOL RESPIRATORY (INHALATION) at 05:53

## 2022-10-16 RX ADMIN — HEPARIN SODIUM 5000 UNIT(S): 5000 INJECTION INTRAVENOUS; SUBCUTANEOUS at 13:04

## 2022-10-16 RX ADMIN — Medication 1: at 11:59

## 2022-10-16 RX ADMIN — OXYCODONE HYDROCHLORIDE 60 MILLIGRAM(S): 5 TABLET ORAL at 17:23

## 2022-10-16 RX ADMIN — NYSTATIN CREAM 1 APPLICATION(S): 100000 CREAM TOPICAL at 05:52

## 2022-10-16 RX ADMIN — Medication 40 MILLIGRAM(S): at 05:52

## 2022-10-16 RX ADMIN — NYSTATIN CREAM 1 APPLICATION(S): 100000 CREAM TOPICAL at 22:10

## 2022-10-16 RX ADMIN — POLYETHYLENE GLYCOL 3350 17 GRAM(S): 17 POWDER, FOR SOLUTION ORAL at 22:17

## 2022-10-16 RX ADMIN — ATORVASTATIN CALCIUM 80 MILLIGRAM(S): 80 TABLET, FILM COATED ORAL at 22:16

## 2022-10-16 RX ADMIN — Medication 3 MILLILITER(S): at 11:12

## 2022-10-16 RX ADMIN — BUDESONIDE AND FORMOTEROL FUMARATE DIHYDRATE 2 PUFF(S): 160; 4.5 AEROSOL RESPIRATORY (INHALATION) at 17:26

## 2022-10-16 RX ADMIN — Medication 2 UNIT(S): at 08:40

## 2022-10-16 RX ADMIN — Medication 81 MILLIGRAM(S): at 11:12

## 2022-10-16 RX ADMIN — OXYCODONE HYDROCHLORIDE 10 MILLIGRAM(S): 5 TABLET ORAL at 11:45

## 2022-10-16 RX ADMIN — Medication 3 MILLILITER(S): at 00:29

## 2022-10-16 RX ADMIN — OXYCODONE HYDROCHLORIDE 10 MILLIGRAM(S): 5 TABLET ORAL at 22:18

## 2022-10-16 RX ADMIN — Medication 2000 UNIT(S): at 11:12

## 2022-10-16 RX ADMIN — Medication 2 UNIT(S): at 16:41

## 2022-10-16 RX ADMIN — Medication 1 APPLICATION(S): at 05:53

## 2022-10-16 RX ADMIN — OXYCODONE HYDROCHLORIDE 10 MILLIGRAM(S): 5 TABLET ORAL at 15:28

## 2022-10-16 RX ADMIN — Medication 1: at 08:40

## 2022-10-16 NOTE — PROGRESS NOTE ADULT - PROBLEM SELECTOR PLAN 8
Diet: DASH/CC  DVT PPx: subQ heparin  Dispo: Pending medical course  GOC: Patient states she has advanced directives/will at home. DNR with trial of intubation. Patient's son to bring in paperwork from home - Noted under abdominal pannus  - Nystatin powder  - Wound care consulted, f/u recs - Noted on OSH echo 11/2020  - f/u TTE  - f/u OP pulm

## 2022-10-16 NOTE — PROGRESS NOTE ADULT - PROBLEM SELECTOR PLAN 4
- On Toujeo 80 units AM, 84 units PM + Novolog 20 qac at home  - c/w Lantus at reduced dose to 40 qhs for now, titrate as needed  - Hold premeal insulin for now  - Low dose correctional scale - Cr 1.55->1.83 (BMP not yet back from this AM), BL ~1.6  - Avoid nephrotoxins, dose per GFR Alk phos elevated to 137. Will check GGT to differentiate.

## 2022-10-16 NOTE — PROGRESS NOTE ADULT - PROBLEM SELECTOR PLAN 6
- Noted on OSH echo 11/2020  - f/u TTE  - f/u OP pulm - BPs well controlled  - c/w home lisinopril - On Toujeo 80 units AM, 84 units PM + Novolog 20 qac at home  - c/w Lantus at reduced dose to 40 qhs for now, titrate as needed  - Hold premeal insulin for now  - Low dose correctional scale

## 2022-10-16 NOTE — PROGRESS NOTE ADULT - PROBLEM SELECTOR PLAN 1
Patient presenting w/ dyspnea and hypoxemia despite supplemental O2, noted to be anasarcic by family and outpatient providers. Reports increased wheezing. Hx notable for asthma, HF, limited mobility. Consider HF exacerbation vs. asthma exacerbation vs. PE vs. exacerbation of pHTN. D-dimer 339, wnl for patient's age, low suspicion for PE.  - last TTE 11/2020, EF 70%, suboptimal image quality  - CXR clear, proBNP elevated to 1174, however improved from January 2022 1329  - noted to have pHTN at [facility] on 11/2020  - trops peaked 10/13, will stop trending  - BL 3-4L O2 at home, does not use CPAP/BiPAP qhs, uses daily nebulizer, not always compliant with Advair pump    - curbsided pulm for BIPAP to help with hypercarbia and sleep, will start BIPAP 12/5 QHS and titrate up as tolerated  - repeat TTE, check LE duplex to r/o DVT  - lasix 40mg IV QD for diuresis, patient's dyspnea has not yet improved but anasarca subjectively improved  - trial duonebs, hold off on steroids for now  - continue singulair (therapeutic interchange for Advair)  - Daily bed weights, strict I/O, fluid restrict Patient presenting w/ dyspnea and hypoxemia despite supplemental O2, noted to be anasarcic by family and outpatient providers. Reports increased wheezing. Hx notable for asthma, HF, limited mobility. Consider HF exacerbation vs. asthma exacerbation vs. PE vs. exacerbation of pHTN. D-dimer 339, wnl for patient's age, low suspicion for PE.  - last TTE 11/2020, EF 70%, suboptimal image quality  - CXR clear, proBNP elevated to 1174, however improved from January 2022 1329  - noted to have pHTN at [facility] on 11/2020  - trops peaked 10/13, will stop trending  - BL 3-4L O2 at home, does not use CPAP/BiPAP qhs, uses daily nebulizer, not always compliant with Advair pump    - ABG w/ pCO2 75->65, pH 7.32 -> 7.38    - trialed BIPAP for sleep however patient did not tolerate well  - repeat TTE, check LE duplex to r/o DVT  - lasix 40mg IV QD for diuresis, patient's dyspnea has not yet improved but anasarca subjectively improved  - continue duonebs, hold off on steroids for now  - continue singulair (therapeutic interchange for Advair)  - Daily bed weights, strict I/O, fluid restrict Patient presenting w/ dyspnea and hypoxemia despite supplemental O2, noted to be anasarcic by family and outpatient providers. Reports increased wheezing. Hx notable for asthma, HF, limited mobility. Consider HF exacerbation vs. asthma exacerbation vs. PE vs. exacerbation of pHTN. D-dimer 339, wnl for patient's age, low suspicion for PE.  - last TTE 11/2020, EF 70%, suboptimal image quality  - CXR clear, proBNP elevated to 1174, however improved from January 2022 1329  - noted to have pHTN at [facility] on 11/[facility]  - trops peaked 10/13, will stop trending  - BL 3-4L O2 at home, does not use CPAP/BiPAP qhs, uses daily nebulizer, not always compliant with Advair pump    - ABG w/ pCO2 75->65, pH 7.32 -> 7.38    - trialed BIPAP for sleep however patient did not tolerate well, will recommend AVAPS as suggested by RT (, EPAP 5, max/min 25/10, FiO2 40)  - repeat TTE, check LE duplex to r/o DVT  - lasix 40mg IV QD for diuresis, patient's dyspnea has not yet improved but anasarca subjectively improved  - continue duonebs, hold off on steroids for now  - continue singulair (therapeutic interchange for Advair)  - Daily bed weights, strict I/O, fluid restrict

## 2022-10-16 NOTE — PROGRESS NOTE ADULT - SUBJECTIVE AND OBJECTIVE BOX
Internal Medicine   Alejandro Mckeon | PGY-1    OVERNIGHT EVENTS: No acute overnight events. Endorsed 5/10 chest pressure during bed transfer this AM, resolved in <1m, no ectopy on tele, vitals stable.    SUBJECTIVE: Was not able to tolerate BIPAP, states she had too much difficulty exhaling due to the pressure, even with RT at bedside. Thinks shortness of breath has improved however still worsens with very brief exertion (e.g. moving in bed, using the bathroom). Has not had BMs in 6d, denies feeling constipated and prefers oral medication for this. Denies fevers.    MEDICATIONS  (STANDING):  albuterol/ipratropium for Nebulization 3 milliLiter(s) Nebulizer every 6 hours  ammonium lactate 12% Lotion 1 Application(s) Topical every 12 hours  aspirin enteric coated 81 milliGRAM(s) Oral daily  atorvastatin 80 milliGRAM(s) Oral at bedtime  bisacodyl Suppository 10 milliGRAM(s) Rectal once  budesonide 160 MICROgram(s)/formoterol 4.5 MICROgram(s) Inhaler 2 Puff(s) Inhalation two times a day  cholecalciferol 2000 Unit(s) Oral daily  dextrose 50% Injectable 25 Gram(s) IV Push once  dextrose 50% Injectable 12.5 Gram(s) IV Push once  dextrose 50% Injectable 25 Gram(s) IV Push once  dextrose Oral Gel 15 Gram(s) Oral once  furosemide   Injectable 40 milliGRAM(s) IV Push daily  glucagon  Injectable 1 milliGRAM(s) IntraMuscular once  heparin   Injectable 5000 Unit(s) SubCutaneous every 8 hours  insulin glargine Injectable (LANTUS) 40 Unit(s) SubCutaneous at bedtime  insulin lispro (ADMELOG) corrective regimen sliding scale   SubCutaneous three times a day before meals  insulin lispro (ADMELOG) corrective regimen sliding scale   SubCutaneous at bedtime  insulin lispro Injectable (ADMELOG) 2 Unit(s) SubCutaneous three times a day before meals  ketotifen 0.025% Ophthalmic Solution 1 Drop(s) Both EYES two times a day  loratadine 10 milliGRAM(s) Oral daily  nystatin Powder 1 Application(s) Topical three times a day  oxyCODONE  ER Tablet 60 milliGRAM(s) Oral every 12 hours  polyethylene glycol 3350 17 Gram(s) Oral daily  senna 2 Tablet(s) Oral at bedtime    MEDICATIONS  (PRN):  acetaminophen     Tablet .. 650 milliGRAM(s) Oral every 6 hours PRN Temp greater or equal to 38C (100.4F), Mild Pain (1 - 3)  ondansetron    Tablet 4 milliGRAM(s) Oral every 8 hours PRN Nausea and/or Vomiting  oxyCODONE    IR 10 milliGRAM(s) Oral every 4 hours PRN Severe Pain (7 - 10)    VITALS:  T(F): 97.7 (10-16-22 @ 09:03), Max: 98.4 (10-16-22 @ 05:50)  HR: 75 (10-16-22 @ 09:03) (69 - 85)  BP: 135/75 (10-16-22 @ 09:03) (123/60 - 147/64)  BP(mean): --  RR: 18 (10-16-22 @ 09:03) (18 - 19)  SpO2: 96% (10-16-22 @ 09:03) (96% - 97%)    PHYSICAL EXAM:   GENERAL: morbidly obese, NAD, resting comfortably in bed, alert and conversational  HEAD: Atraumatic, normocephalic  EYES: EOMI, conjunctiva and sclera clear, no nystagmus noted  ENT: Moist mucous membranes  CHEST/LUNG: Clear to auscultation bilaterally; no rales, rhonchi, wheezing, or rubs; unlabored respirations  HEART: Regular rate and rhythm; no murmurs, rubs, or gallops, normal S1/S2  ABDOMEN: Normal bowel sounds; firm pannus which extends to right, nontender, does not appear distended  EXTREMITIES: ~2+ pitting edema in bilateral lower extremities; no clubbing or cyanosis  MSK: No gross deformities noted  NEURO: Grossly nonfocal  SKIN: Dermatitis in bilateral ankles, improved from admission  PSYCH: Normal mood, affect     LABS:  10-15  138  |  97  |  38<H>  ----------------------------<  260<H>  4.7   |  31  |  1.77<H>    Ca    9.1      15 Oct 2022 10:46  Phos  3.9     10-15  Mg     1.9     10-15    TPro  6.6  /  Alb  3.5  /  TBili  0.8  /  DBili  x   /  AST  13  /  ALT  13  /  AlkPhos  117  10-15    Creatinine Trend: 1.77<--, 1.83<--, 1.78<--, 1.55<--  I&O's Summary    15 Oct 2022 07:01  -  16 Oct 2022 07:00  --------------------------------------------------------  IN: 1080 mL / OUT: 3200 mL / NET: -2120 mL

## 2022-10-16 NOTE — PROGRESS NOTE ADULT - PROBLEM SELECTOR PLAN 9
Diet: DASH/CC  DVT PPx: subQ heparin  Dispo: Pending medical course  GOC: Patient states she has advanced directives/will at home. DNR with trial of intubation. Patient's son to bring in paperwork from home - Noted under abdominal pannus  - Nystatin powder  - Wound care consulted, f/u recs

## 2022-10-16 NOTE — PROGRESS NOTE ADULT - ATTENDING COMMENTS
Maureen Fenton MD  Division of Hospital Medicine  [facility]   Available on [employer] Teams - messages preferred prior to calls.    Patient seen and examined today with Team 2. Agree with above findings, assessment, and plan with the following additions/exceptions:    Overall, 65 yo female with PMH of morbid obesity, moderate persistent asthma, HTN, HLD, DM, CKD3, HFpEF on 4-6L NC oxygen at baseline (not on BiPAP/CPAP qhs) who presents with acute worsening dyspnea admitted for acute decompensated heart failure.    Active Issues:  #Acute Hypoxic Respiratory Failure  #Acute Decompensated Heart Failure  #Anasarca  #Elevated D-Dimer  #Allergic Conjunctivitis  #CKD  #Insulin-Dependent T2DM with hyperglycemia  #Abnormal Breast Exam    Plan:  - c/w lasix 40mg IV BID. was net negative 2.12 L in last 24 hours  - goal net neg 1-2L/day   - f/u TTE - pending  - didn't tolerate BIPAP again overnight, but will trial on AVAPs tonight  - Cr stable  - f/u duplex of LE to r/o VTE given elevated d-dimer  - complaining of conjunctival pruritis s/p trial of olopatadine drops as outpatient with no relief  - trial of ketotifen drops BID with some relief  - increase pre-meal admelog to 3 units TID qAC  - endorse ongoing issues with R>L breast with regards to drainage/weeping prior to admission requesting breast exam - no lesions/weeping noted though on R breast with hard nodularity/?masses on exam - non-tender. would f/u with breast ultrasound to further evaluate either inpatient vs. outpatient      Rest as detailed in note above.    Plan discussed with patient and Team 2 resident Dr. Mckeon.

## 2022-10-16 NOTE — PROGRESS NOTE ADULT - PROBLEM SELECTOR PLAN 5
- BPs well controlled  - c/w home lisinopril - On Toujeo 80 units AM, 84 units PM + Novolog 20 qac at home  - c/w Lantus at reduced dose to 40 qhs for now, titrate as needed  - Hold premeal insulin for now  - Low dose correctional scale - Cr 1.55->1.83 (BMP not yet back from this AM), BL ~1.6  - Avoid nephrotoxins, dose per GFR

## 2022-10-16 NOTE — PROGRESS NOTE ADULT - PROBLEM SELECTOR PLAN 3
- Cr 1.55->1.83, BL ~1.6  - K elevated to 5.7 on admission, now improved  - s/p Lasix 80 IV x1, Lokelma x1 in ED  - EKG unremarkable  - I/O as above  - Avoid nephrotoxins, dose per GFR Patient w/ chronic lower back and leg pain 2/2 spinal stenosis. Largely bedbound at baseline. Has oxycontin 60mg Q12H at home, per patient recently dc'ed short acting meds upon discussion w/ her internist as she did not want to feel drowsy.  - continue home oxycontin 60mg Q12H  - oxy 10mg PO Q4H PRN for breakthrough pain

## 2022-10-16 NOTE — PROGRESS NOTE ADULT - PROBLEM SELECTOR PLAN 7
- Noted under abdominal pannus  - Nystatin powder  - Wound care consulted, f/u recs - Noted on OSH echo 11/2020  - f/u TTE  - f/u OP pulm - BPs well controlled  - c/w home lisinopril

## 2022-10-16 NOTE — PROGRESS NOTE ADULT - PROBLEM SELECTOR PLAN 2
Patient w/ chronic lower back and leg pain 2/2 spinal stenosis. Largely bedbound at baseline. Has oxycontin 60mg Q12H at home, per patient recently dc'ed short acting meds upon discussion w/ her internist as she did not want to feel drowsy.  - continue home oxycontin 60mg Q12H  - oxy 10mg PO Q4H PRN for breakthrough pain Patient without BM for 6d. Started miralax yesterday, will add on lactulose per patient preference (senna did not work previously, prefers oral vs. rectal), will start with 20g PO but can increase to 40g if necessary. Patient without BM for 6d. Started miralax yesterday, will continue miralax BID and add on lactulose per patient preference (senna did not work previously, prefers oral vs. rectal), will start with 20g PO but can increase to 40g if necessary.

## 2022-10-17 LAB
ANION GAP SERPL CALC-SCNC: 12 MMOL/L — SIGNIFICANT CHANGE UP (ref 5–17)
BUN SERPL-MCNC: 37 MG/DL — HIGH (ref 7–23)
CALCIUM SERPL-MCNC: 9.7 MG/DL — SIGNIFICANT CHANGE UP (ref 8.4–10.5)
CHLORIDE SERPL-SCNC: 92 MMOL/L — LOW (ref 96–108)
CO2 SERPL-SCNC: 33 MMOL/L — HIGH (ref 22–31)
CREAT SERPL-MCNC: 1.69 MG/DL — HIGH (ref 0.5–1.3)
EGFR: 33 ML/MIN/1.73M2 — LOW
GGT SERPL-CCNC: 79 U/L — HIGH (ref 8–40)
GLUCOSE BLDC GLUCOMTR-MCNC: 138 MG/DL — HIGH (ref 70–99)
GLUCOSE BLDC GLUCOMTR-MCNC: 147 MG/DL — HIGH (ref 70–99)
GLUCOSE BLDC GLUCOMTR-MCNC: 197 MG/DL — HIGH (ref 70–99)
GLUCOSE BLDC GLUCOMTR-MCNC: 206 MG/DL — HIGH (ref 70–99)
GLUCOSE SERPL-MCNC: 223 MG/DL — HIGH (ref 70–99)
HCT VFR BLD CALC: 39.2 % — SIGNIFICANT CHANGE UP (ref 34.5–45)
HGB BLD-MCNC: 11.9 G/DL — SIGNIFICANT CHANGE UP (ref 11.5–15.5)
MAGNESIUM SERPL-MCNC: 2.1 MG/DL — SIGNIFICANT CHANGE UP (ref 1.6–2.6)
MCHC RBC-ENTMCNC: 27.7 PG — SIGNIFICANT CHANGE UP (ref 27–34)
MCHC RBC-ENTMCNC: 30.4 GM/DL — LOW (ref 32–36)
MCV RBC AUTO: 91.2 FL — SIGNIFICANT CHANGE UP (ref 80–100)
NRBC # BLD: 0 /100 WBCS — SIGNIFICANT CHANGE UP (ref 0–0)
PHOSPHATE SERPL-MCNC: 3.2 MG/DL — SIGNIFICANT CHANGE UP (ref 2.5–4.5)
PLATELET # BLD AUTO: 207 K/UL — SIGNIFICANT CHANGE UP (ref 150–400)
POTASSIUM SERPL-MCNC: 5 MMOL/L — SIGNIFICANT CHANGE UP (ref 3.5–5.3)
POTASSIUM SERPL-SCNC: 5 MMOL/L — SIGNIFICANT CHANGE UP (ref 3.5–5.3)
RBC # BLD: 4.3 M/UL — SIGNIFICANT CHANGE UP (ref 3.8–5.2)
RBC # FLD: 15.3 % — HIGH (ref 10.3–14.5)
SODIUM SERPL-SCNC: 137 MMOL/L — SIGNIFICANT CHANGE UP (ref 135–145)
WBC # BLD: 8.95 K/UL — SIGNIFICANT CHANGE UP (ref 3.8–10.5)
WBC # FLD AUTO: 8.95 K/UL — SIGNIFICANT CHANGE UP (ref 3.8–10.5)

## 2022-10-17 PROCEDURE — 99233 SBSQ HOSP IP/OBS HIGH 50: CPT | Mod: GC

## 2022-10-17 PROCEDURE — 93970 EXTREMITY STUDY: CPT | Mod: 26

## 2022-10-17 RX ORDER — ONDANSETRON 8 MG/1
4 TABLET, FILM COATED ORAL ONCE
Refills: 0 | Status: COMPLETED | OUTPATIENT
Start: 2022-10-17 | End: 2022-10-17

## 2022-10-17 RX ORDER — CHLORHEXIDINE GLUCONATE 213 G/1000ML
1 SOLUTION TOPICAL
Refills: 0 | Status: DISCONTINUED | OUTPATIENT
Start: 2022-10-17 | End: 2022-10-26

## 2022-10-17 RX ORDER — FUROSEMIDE 40 MG
40 TABLET ORAL EVERY 12 HOURS
Refills: 0 | Status: DISCONTINUED | OUTPATIENT
Start: 2022-10-17 | End: 2022-10-24

## 2022-10-17 RX ADMIN — ONDANSETRON 4 MILLIGRAM(S): 8 TABLET, FILM COATED ORAL at 03:27

## 2022-10-17 RX ADMIN — CHLORHEXIDINE GLUCONATE 1 APPLICATION(S): 213 SOLUTION TOPICAL at 17:15

## 2022-10-17 RX ADMIN — HEPARIN SODIUM 5000 UNIT(S): 5000 INJECTION INTRAVENOUS; SUBCUTANEOUS at 06:12

## 2022-10-17 RX ADMIN — HEPARIN SODIUM 5000 UNIT(S): 5000 INJECTION INTRAVENOUS; SUBCUTANEOUS at 21:22

## 2022-10-17 RX ADMIN — INSULIN GLARGINE 40 UNIT(S): 100 INJECTION, SOLUTION SUBCUTANEOUS at 21:21

## 2022-10-17 RX ADMIN — Medication 3 UNIT(S): at 08:31

## 2022-10-17 RX ADMIN — Medication 2000 UNIT(S): at 11:19

## 2022-10-17 RX ADMIN — OXYCODONE HYDROCHLORIDE 60 MILLIGRAM(S): 5 TABLET ORAL at 07:16

## 2022-10-17 RX ADMIN — OXYCODONE HYDROCHLORIDE 10 MILLIGRAM(S): 5 TABLET ORAL at 11:19

## 2022-10-17 RX ADMIN — OXYCODONE HYDROCHLORIDE 60 MILLIGRAM(S): 5 TABLET ORAL at 06:16

## 2022-10-17 RX ADMIN — KETOTIFEN FUMARATE 1 DROP(S): 0.34 SOLUTION OPHTHALMIC at 06:12

## 2022-10-17 RX ADMIN — ATORVASTATIN CALCIUM 80 MILLIGRAM(S): 80 TABLET, FILM COATED ORAL at 21:22

## 2022-10-17 RX ADMIN — Medication 81 MILLIGRAM(S): at 11:19

## 2022-10-17 RX ADMIN — Medication 2: at 12:10

## 2022-10-17 RX ADMIN — OXYCODONE HYDROCHLORIDE 60 MILLIGRAM(S): 5 TABLET ORAL at 17:08

## 2022-10-17 RX ADMIN — Medication 3 UNIT(S): at 17:08

## 2022-10-17 RX ADMIN — Medication 40 MILLIGRAM(S): at 06:10

## 2022-10-17 RX ADMIN — Medication 1: at 08:30

## 2022-10-17 RX ADMIN — Medication 3 MILLILITER(S): at 17:04

## 2022-10-17 RX ADMIN — NYSTATIN CREAM 1 APPLICATION(S): 100000 CREAM TOPICAL at 13:12

## 2022-10-17 RX ADMIN — OXYCODONE HYDROCHLORIDE 10 MILLIGRAM(S): 5 TABLET ORAL at 12:19

## 2022-10-17 RX ADMIN — NYSTATIN CREAM 1 APPLICATION(S): 100000 CREAM TOPICAL at 06:13

## 2022-10-17 RX ADMIN — BUDESONIDE AND FORMOTEROL FUMARATE DIHYDRATE 2 PUFF(S): 160; 4.5 AEROSOL RESPIRATORY (INHALATION) at 17:04

## 2022-10-17 RX ADMIN — Medication 3 MILLILITER(S): at 11:20

## 2022-10-17 RX ADMIN — POLYETHYLENE GLYCOL 3350 17 GRAM(S): 17 POWDER, FOR SOLUTION ORAL at 17:09

## 2022-10-17 RX ADMIN — Medication 3 UNIT(S): at 12:11

## 2022-10-17 RX ADMIN — POLYETHYLENE GLYCOL 3350 17 GRAM(S): 17 POWDER, FOR SOLUTION ORAL at 06:13

## 2022-10-17 RX ADMIN — Medication 3 MILLILITER(S): at 21:21

## 2022-10-17 RX ADMIN — Medication 40 MILLIGRAM(S): at 17:03

## 2022-10-17 RX ADMIN — Medication 10 MILLIGRAM(S): at 11:22

## 2022-10-17 RX ADMIN — NYSTATIN CREAM 1 APPLICATION(S): 100000 CREAM TOPICAL at 21:34

## 2022-10-17 RX ADMIN — OXYCODONE HYDROCHLORIDE 60 MILLIGRAM(S): 5 TABLET ORAL at 18:34

## 2022-10-17 RX ADMIN — KETOTIFEN FUMARATE 1 DROP(S): 0.34 SOLUTION OPHTHALMIC at 17:08

## 2022-10-17 RX ADMIN — LORATADINE 10 MILLIGRAM(S): 10 TABLET ORAL at 11:22

## 2022-10-17 RX ADMIN — HEPARIN SODIUM 5000 UNIT(S): 5000 INJECTION INTRAVENOUS; SUBCUTANEOUS at 13:12

## 2022-10-17 NOTE — PROGRESS NOTE ADULT - SUBJECTIVE AND OBJECTIVE BOX
Internal Medicine   Alejandro Mckeon | PGY-1    OVERNIGHT EVENTS: No acute overnight events. Night float notified for 1 possible episode of vfib however on review of tele appears to be artifact.    SUBJECTIVE: Patient reports significant distress overnight as she was worried that she was "going to die" and was afraid to fall asleep. Notes that she ?saw that her oxygen level was 70 when her NC fell off - she became so anxious that she was short of breath and vomited twice. States that the IV medication she received overnight (zofran) helped. Today requesting if she could be helped out of bed and into a chair for ~20 minutes during the day.    MEDICATIONS  (STANDING):  albuterol/ipratropium for Nebulization 3 milliLiter(s) Nebulizer every 6 hours  ammonium lactate 12% Lotion 1 Application(s) Topical every 12 hours  aspirin enteric coated 81 milliGRAM(s) Oral daily  atorvastatin 80 milliGRAM(s) Oral at bedtime  budesonide 160 MICROgram(s)/formoterol 4.5 MICROgram(s) Inhaler 2 Puff(s) Inhalation two times a day  chlorhexidine 2% Cloths 1 Application(s) Topical <User Schedule>  cholecalciferol 2000 Unit(s) Oral daily  dextrose 50% Injectable 25 Gram(s) IV Push once  dextrose 50% Injectable 12.5 Gram(s) IV Push once  dextrose 50% Injectable 25 Gram(s) IV Push once  dextrose Oral Gel 15 Gram(s) Oral once  furosemide   Injectable 40 milliGRAM(s) IV Push every 12 hours  glucagon  Injectable 1 milliGRAM(s) IntraMuscular once  heparin   Injectable 5000 Unit(s) SubCutaneous every 8 hours  insulin glargine Injectable (LANTUS) 40 Unit(s) SubCutaneous at bedtime  insulin lispro (ADMELOG) corrective regimen sliding scale   SubCutaneous three times a day before meals  insulin lispro (ADMELOG) corrective regimen sliding scale   SubCutaneous at bedtime  insulin lispro Injectable (ADMELOG) 3 Unit(s) SubCutaneous three times a day before meals  ketotifen 0.025% Ophthalmic Solution 1 Drop(s) Both EYES two times a day  loratadine 10 milliGRAM(s) Oral daily  nystatin Powder 1 Application(s) Topical three times a day  oxyCODONE  ER Tablet 60 milliGRAM(s) Oral every 12 hours  polyethylene glycol 3350 17 Gram(s) Oral every 12 hours    MEDICATIONS  (PRN):  acetaminophen     Tablet .. 650 milliGRAM(s) Oral every 6 hours PRN Temp greater or equal to 38C (100.4F), Mild Pain (1 - 3)  ondansetron    Tablet 4 milliGRAM(s) Oral every 8 hours PRN Nausea and/or Vomiting  oxyCODONE    IR 10 milliGRAM(s) Oral every 4 hours PRN Severe Pain (7 - 10)    VITALS:  T(F): 97.4 (10-17-22 @ 11:08), Max: 98.4 (10-17-22 @ 06:26)  HR: 92 (10-17-22 @ 11:08) (74 - 92)  BP: 159/77 (10-17-22 @ 11:08) (145/60 - 159/77)  BP(mean): --  RR: 18 (10-17-22 @ 11:08) (16 - 18)  SpO2: 94% (10-17-22 @ 11:08) (91% - 96%)    PHYSICAL EXAM:   GENERAL: morbidly obese, NAD, resting in bed, tearful when recounting night events  HEAD: Atraumatic, normocephalic  EYES: EOMI, conjunctiva and sclera clear, no nystagmus noted  ENT: Moist mucous membranes  CHEST/LUNG: Mild wheezing noted across lung fields; no rales, rhonchi, or rubs (though difficult auscultation due to habitus); unlabored respirations on 4L NC  HEART: Regular rate and rhythm; no murmurs, rubs, or gallops, normal S1/S2  ABDOMEN: Firm pannus, otherwise soft, nontender, nondistended  EXTREMITIES: 2+ pitting edema in bilateral extremities, no clubbing or cyanosis  MSK: No gross deformities noted   Neurological: Grossly nonfocal  SKIN: Bilateral ankle wounds healing appropriately, no other or lesions  PSYCH: Appropriate mood, affect    LABS:                      11.9   8.95  )-----------( 207      ( 17 Oct 2022 11:23 )             39.2     10-17  137  |  92<L>  |  37<H>  ----------------------------<  223<H>  5.0   |  33<H>  |  1.69<H>    Ca    9.7      17 Oct 2022 11:23  Phos  3.2     10-17  Mg     2.1     10-17    TPro  7.4  /  Alb  4.0  /  TBili  1.0  /  DBili  x   /  AST  18  /  ALT  16  /  AlkPhos  137<H>  10-16    Creatinine Trend: 1.69<--, 1.74<--, 1.77<--, 1.83<--, 1.78<--, 1.55<--    I&O's Summary  16 Oct 2022 07:01  -  17 Oct 2022 07:00  --------------------------------------------------------  IN: 990 mL / OUT: 1200 mL / NET: -210 mL    17 Oct 2022 07:01  -  17 Oct 2022 14:46  --------------------------------------------------------  IN: 640 mL / OUT: 1100 mL / NET: -460 mL

## 2022-10-17 NOTE — PROGRESS NOTE ADULT - ATTENDING COMMENTS
Patient seen and evaluated with son at bedside. Patient more groggy, did not sleep well last night, concerned about oxygen. No longer nauseous.       #Acute decompensated heart failure  -increase lasix to 40mg IV BID. strict I/O.  -TTE still pending.  . Given anasarca my suspicion is biventricular failure with cor pulmonale and severe pulm HTN being primary culprit of current anasarca.   -,will hold off acetazolamide at this time givne concominant respiratory acidosis.   - encouraged AVAPS if patient can tolerate.     #Hypoxic and hypercarbic respiratory failure  -treatment as above.   -in addition given elevated bicarbonate, elevated CO2, in addition to hx of snoring, daytime solmelence, obesity, suspect obesity-hyperventilation syndrome, though did not tolerate her sleep study previously.   -AVAPS as tolerated.    #CKD  -at around baseline.  -acceptable rise in effort for diuresis.  -monitor.     #Chronic pain  -continue oxycontin 60mg BID *home medication)  -continue oxycodone 10mg q4hr for mod-severe pain, hold for sedation.     #Morbid obesity  -nutrition consult.   -after acute issues can discuss with outpatient provider GLP-1.    #Elevated D-dlimer  -sent overnight, age-adjusted normal. givne subacute nature of SOB, have low suspcion for PE, is normal for age adjusted Dimer. However will check duplex LE to r/o clot.

## 2022-10-17 NOTE — PROGRESS NOTE ADULT - PROBLEM SELECTOR PLAN 2
Patient without BM for 6d. Started miralax yesterday, will continue miralax BID and add on lactulose per patient preference (senna did not work previously, prefers oral vs. rectal), will start with 20g PO but can increase to 40g if necessary. Patient without BM for 7d. Received miralax BID and lactulose yesterday, will add on dulcolax suppository.

## 2022-10-17 NOTE — PROGRESS NOTE ADULT - PROBLEM SELECTOR PLAN 4
Alk phos elevated to 137. Will check GGT to differentiate. Alk phos elevated to 137. f/u GGT to differentiate.

## 2022-10-17 NOTE — PROGRESS NOTE ADULT - PROBLEM SELECTOR PLAN 5
- Cr 1.55->1.83 (BMP not yet back from this AM), BL ~1.6  - Avoid nephrotoxins, dose per GFR - Cr steadily improving, now 1.69. Baseline ~1.6  - Avoid nephrotoxins, dose per GFR

## 2022-10-17 NOTE — PROGRESS NOTE ADULT - PROBLEM SELECTOR PLAN 1
Patient presenting w/ dyspnea and hypoxemia despite supplemental O2, noted to be anasarcic by family and outpatient providers. Reports increased wheezing. Hx notable for asthma, HF, limited mobility. Consider HF exacerbation vs. asthma exacerbation vs. PE vs. exacerbation of pHTN. D-dimer 339, wnl for patient's age, low suspicion for PE.  - last TTE 11/2020, EF 70%, suboptimal image quality  - CXR clear, proBNP elevated to 1174, however improved from January 2022 1329  - noted to have pHTN at [facility] on 11/2020  - trops peaked 10/13, will stop trending  - BL 3-4L O2 at home, does not use CPAP/BiPAP qhs, uses daily nebulizer, not always compliant with Advair pump    - ABG w/ pCO2 75->65, pH 7.32 -> 7.38    - trialed BIPAP for sleep however patient did not tolerate well, will recommend AVAPS as suggested by RT (, EPAP 5, max/min 25/10, FiO2 40)  - repeat TTE, check LE duplex to r/o DVT  - lasix 40mg IV QD for diuresis, patient's dyspnea has not yet improved but anasarca subjectively improved  - continue duonebs, hold off on steroids for now  - continue singulair (therapeutic interchange for Advair)  - Daily bed weights, strict I/O, fluid restrict Patient presenting w/ dyspnea and hypoxemia despite supplemental O2, noted to be anasarcic by family and outpatient providers. Reports increased wheezing. Hx notable for asthma, HF, limited mobility. Consider HF exacerbation vs. asthma exacerbation vs. PE vs. exacerbation of pHTN. D-dimer 339, wnl for patient's age, low suspicion for PE.  - last TTE 11/2020, EF 70%, suboptimal image quality  - CXR clear, proBNP elevated to 1174, however improved from January 2022 1329  - noted to have pHTN at [facility] on 11/2020  - trops peaked 10/13, will stop trending  - BL 3-4L O2 at home, does not use CPAP/BiPAP qhs, uses daily nebulizer, not always compliant with Advair pump    - pCO2 65, pH appropriately compensated    - trialed BIPAP and AVAPS for sleep however patient did not tolerate well, will re-evaluate need PRN  - repeat TTE, check LE duplex to r/o DVT  - lasix 40mg IV BID (increased from QD 10/17) for diuresis, patient's dyspnea has not fully improved  - continue duonebs, hold off on steroids for now  - continue singulair (therapeutic interchange for Advair)  - Daily bed weights, strict I/O, fluid restrict

## 2022-10-18 LAB
ANION GAP SERPL CALC-SCNC: 11 MMOL/L — SIGNIFICANT CHANGE UP (ref 5–17)
BASE EXCESS BLDV CALC-SCNC: 12.1 MMOL/L — HIGH (ref -2–3)
BUN SERPL-MCNC: 40 MG/DL — HIGH (ref 7–23)
CALCIUM SERPL-MCNC: 9.4 MG/DL — SIGNIFICANT CHANGE UP (ref 8.4–10.5)
CHLORIDE SERPL-SCNC: 96 MMOL/L — SIGNIFICANT CHANGE UP (ref 96–108)
CO2 BLDV-SCNC: 40 MMOL/L — HIGH (ref 22–26)
CO2 SERPL-SCNC: 35 MMOL/L — HIGH (ref 22–31)
CREAT SERPL-MCNC: 1.8 MG/DL — HIGH (ref 0.5–1.3)
CULTURE RESULTS: SIGNIFICANT CHANGE UP
CULTURE RESULTS: SIGNIFICANT CHANGE UP
EGFR: 30 ML/MIN/1.73M2 — LOW
GAS PNL BLDV: SIGNIFICANT CHANGE UP
GLUCOSE BLDC GLUCOMTR-MCNC: 131 MG/DL — HIGH (ref 70–99)
GLUCOSE BLDC GLUCOMTR-MCNC: 167 MG/DL — HIGH (ref 70–99)
GLUCOSE BLDC GLUCOMTR-MCNC: 179 MG/DL — HIGH (ref 70–99)
GLUCOSE BLDC GLUCOMTR-MCNC: 206 MG/DL — HIGH (ref 70–99)
GLUCOSE SERPL-MCNC: 131 MG/DL — HIGH (ref 70–99)
HCO3 BLDV-SCNC: 39 MMOL/L — HIGH (ref 22–29)
HCT VFR BLD CALC: 36.9 % — SIGNIFICANT CHANGE UP (ref 34.5–45)
HGB BLD-MCNC: 11.4 G/DL — LOW (ref 11.5–15.5)
MAGNESIUM SERPL-MCNC: 2 MG/DL — SIGNIFICANT CHANGE UP (ref 1.6–2.6)
MCHC RBC-ENTMCNC: 27.6 PG — SIGNIFICANT CHANGE UP (ref 27–34)
MCHC RBC-ENTMCNC: 30.9 GM/DL — LOW (ref 32–36)
MCV RBC AUTO: 89.3 FL — SIGNIFICANT CHANGE UP (ref 80–100)
MRSA PCR RESULT.: SIGNIFICANT CHANGE UP
NRBC # BLD: 0 /100 WBCS — SIGNIFICANT CHANGE UP (ref 0–0)
PCO2 BLDV: 57 MMHG — HIGH (ref 39–42)
PH BLDV: 7.44 — HIGH (ref 7.32–7.43)
PHOSPHATE SERPL-MCNC: 3.3 MG/DL — SIGNIFICANT CHANGE UP (ref 2.5–4.5)
PLATELET # BLD AUTO: 196 K/UL — SIGNIFICANT CHANGE UP (ref 150–400)
PO2 BLDV: 71 MMHG — HIGH (ref 25–45)
POTASSIUM SERPL-MCNC: 4.3 MMOL/L — SIGNIFICANT CHANGE UP (ref 3.5–5.3)
POTASSIUM SERPL-SCNC: 4.3 MMOL/L — SIGNIFICANT CHANGE UP (ref 3.5–5.3)
RBC # BLD: 4.13 M/UL — SIGNIFICANT CHANGE UP (ref 3.8–5.2)
RBC # FLD: 15.4 % — HIGH (ref 10.3–14.5)
S AUREUS DNA NOSE QL NAA+PROBE: SIGNIFICANT CHANGE UP
SAO2 % BLDV: 93.9 % — HIGH (ref 67–88)
SODIUM SERPL-SCNC: 142 MMOL/L — SIGNIFICANT CHANGE UP (ref 135–145)
SPECIMEN SOURCE: SIGNIFICANT CHANGE UP
SPECIMEN SOURCE: SIGNIFICANT CHANGE UP
WBC # BLD: 6.59 K/UL — SIGNIFICANT CHANGE UP (ref 3.8–10.5)
WBC # FLD AUTO: 6.59 K/UL — SIGNIFICANT CHANGE UP (ref 3.8–10.5)

## 2022-10-18 PROCEDURE — 99233 SBSQ HOSP IP/OBS HIGH 50: CPT | Mod: GC

## 2022-10-18 RX ORDER — BENZOCAINE AND MENTHOL 5; 1 G/100ML; G/100ML
1 LIQUID ORAL DAILY
Refills: 0 | Status: DISCONTINUED | OUTPATIENT
Start: 2022-10-18 | End: 2022-10-26

## 2022-10-18 RX ORDER — ACETAZOLAMIDE 250 MG/1
500 TABLET ORAL ONCE
Refills: 0 | Status: COMPLETED | OUTPATIENT
Start: 2022-10-18 | End: 2022-10-18

## 2022-10-18 RX ORDER — ACETAZOLAMIDE 250 MG/1
500 TABLET ORAL ONCE
Refills: 0 | Status: DISCONTINUED | OUTPATIENT
Start: 2022-10-18 | End: 2022-10-18

## 2022-10-18 RX ADMIN — BUDESONIDE AND FORMOTEROL FUMARATE DIHYDRATE 2 PUFF(S): 160; 4.5 AEROSOL RESPIRATORY (INHALATION) at 05:11

## 2022-10-18 RX ADMIN — OXYCODONE HYDROCHLORIDE 10 MILLIGRAM(S): 5 TABLET ORAL at 23:12

## 2022-10-18 RX ADMIN — OXYCODONE HYDROCHLORIDE 10 MILLIGRAM(S): 5 TABLET ORAL at 10:00

## 2022-10-18 RX ADMIN — Medication 81 MILLIGRAM(S): at 11:58

## 2022-10-18 RX ADMIN — POLYETHYLENE GLYCOL 3350 17 GRAM(S): 17 POWDER, FOR SOLUTION ORAL at 05:27

## 2022-10-18 RX ADMIN — LORATADINE 10 MILLIGRAM(S): 10 TABLET ORAL at 11:59

## 2022-10-18 RX ADMIN — Medication 2000 UNIT(S): at 11:59

## 2022-10-18 RX ADMIN — HEPARIN SODIUM 5000 UNIT(S): 5000 INJECTION INTRAVENOUS; SUBCUTANEOUS at 21:50

## 2022-10-18 RX ADMIN — Medication 40 MILLIGRAM(S): at 05:16

## 2022-10-18 RX ADMIN — HEPARIN SODIUM 5000 UNIT(S): 5000 INJECTION INTRAVENOUS; SUBCUTANEOUS at 15:39

## 2022-10-18 RX ADMIN — KETOTIFEN FUMARATE 1 DROP(S): 0.34 SOLUTION OPHTHALMIC at 18:34

## 2022-10-18 RX ADMIN — OXYCODONE HYDROCHLORIDE 60 MILLIGRAM(S): 5 TABLET ORAL at 18:33

## 2022-10-18 RX ADMIN — Medication 3 UNIT(S): at 17:04

## 2022-10-18 RX ADMIN — OXYCODONE HYDROCHLORIDE 60 MILLIGRAM(S): 5 TABLET ORAL at 19:20

## 2022-10-18 RX ADMIN — ATORVASTATIN CALCIUM 80 MILLIGRAM(S): 80 TABLET, FILM COATED ORAL at 21:49

## 2022-10-18 RX ADMIN — OXYCODONE HYDROCHLORIDE 60 MILLIGRAM(S): 5 TABLET ORAL at 05:53

## 2022-10-18 RX ADMIN — POLYETHYLENE GLYCOL 3350 17 GRAM(S): 17 POWDER, FOR SOLUTION ORAL at 18:34

## 2022-10-18 RX ADMIN — BENZOCAINE AND MENTHOL 1 LOZENGE: 5; 1 LIQUID ORAL at 05:08

## 2022-10-18 RX ADMIN — OXYCODONE HYDROCHLORIDE 10 MILLIGRAM(S): 5 TABLET ORAL at 09:21

## 2022-10-18 RX ADMIN — NYSTATIN CREAM 1 APPLICATION(S): 100000 CREAM TOPICAL at 15:40

## 2022-10-18 RX ADMIN — KETOTIFEN FUMARATE 1 DROP(S): 0.34 SOLUTION OPHTHALMIC at 05:30

## 2022-10-18 RX ADMIN — HEPARIN SODIUM 5000 UNIT(S): 5000 INJECTION INTRAVENOUS; SUBCUTANEOUS at 05:13

## 2022-10-18 RX ADMIN — BUDESONIDE AND FORMOTEROL FUMARATE DIHYDRATE 2 PUFF(S): 160; 4.5 AEROSOL RESPIRATORY (INHALATION) at 18:36

## 2022-10-18 RX ADMIN — ONDANSETRON 4 MILLIGRAM(S): 8 TABLET, FILM COATED ORAL at 17:04

## 2022-10-18 RX ADMIN — NYSTATIN CREAM 1 APPLICATION(S): 100000 CREAM TOPICAL at 21:49

## 2022-10-18 RX ADMIN — CHLORHEXIDINE GLUCONATE 1 APPLICATION(S): 213 SOLUTION TOPICAL at 05:11

## 2022-10-18 RX ADMIN — ACETAZOLAMIDE 500 MILLIGRAM(S): 250 TABLET ORAL at 15:39

## 2022-10-18 RX ADMIN — Medication 1 APPLICATION(S): at 18:33

## 2022-10-18 RX ADMIN — INSULIN GLARGINE 40 UNIT(S): 100 INJECTION, SOLUTION SUBCUTANEOUS at 21:50

## 2022-10-18 RX ADMIN — NYSTATIN CREAM 1 APPLICATION(S): 100000 CREAM TOPICAL at 05:16

## 2022-10-18 RX ADMIN — Medication 3 MILLILITER(S): at 11:59

## 2022-10-18 RX ADMIN — Medication 3 MILLILITER(S): at 05:11

## 2022-10-18 RX ADMIN — Medication 3 MILLILITER(S): at 21:49

## 2022-10-18 RX ADMIN — Medication 1: at 11:59

## 2022-10-18 RX ADMIN — Medication 3 UNIT(S): at 12:00

## 2022-10-18 RX ADMIN — OXYCODONE HYDROCHLORIDE 60 MILLIGRAM(S): 5 TABLET ORAL at 05:23

## 2022-10-18 RX ADMIN — Medication 40 MILLIGRAM(S): at 18:33

## 2022-10-18 RX ADMIN — Medication 3 MILLILITER(S): at 18:34

## 2022-10-18 RX ADMIN — Medication 1: at 17:05

## 2022-10-18 RX ADMIN — OXYCODONE HYDROCHLORIDE 10 MILLIGRAM(S): 5 TABLET ORAL at 22:35

## 2022-10-18 RX ADMIN — Medication 3 UNIT(S): at 09:15

## 2022-10-18 NOTE — PROGRESS NOTE ADULT - PROBLEM SELECTOR PLAN 3
Patient w/ chronic lower back and leg pain 2/2 spinal stenosis. Largely bedbound at baseline. Has oxycontin 60mg Q12H at home, per patient recently dc'ed short acting meds upon discussion w/ her internist as she did not want to feel drowsy.  - continue home oxycontin 60mg Q12H  - oxy 10mg PO Q4H PRN for breakthrough pain Alk phos elevated to 137. GGT 79 suggests hepatic source however likely elevated 2/2 obesity as AST/ALT and bili wnl.

## 2022-10-18 NOTE — PROGRESS NOTE ADULT - PROBLEM SELECTOR PLAN 9
- Noted under abdominal pannus  - Nystatin powder  - Wound care consulted, f/u recs Diet: DASH/CC  DVT PPx: subQ heparin  Dispo: Pending medical course  GOC: Patient states she has advanced directives/will at home. DNR with trial of intubation. Patient's son to bring in paperwork from home

## 2022-10-18 NOTE — PROGRESS NOTE ADULT - PROBLEM SELECTOR PLAN 2
Patient without BM for 7d. Received miralax BID and lactulose yesterday, will add on dulcolax suppository. Patient w/ chronic lower back and leg pain 2/2 spinal stenosis. Largely bedbound at baseline. Has oxycontin 60mg Q12H at home, per patient recently dc'ed short acting meds upon discussion w/ her internist as she did not want to feel drowsy.  - continue home oxycontin 60mg Q12H  - oxy 10mg PO Q4H PRN for breakthrough pain

## 2022-10-18 NOTE — PROGRESS NOTE ADULT - PROBLEM SELECTOR PLAN 8
- Noted on OSH echo 11/2020  - f/u TTE  - f/u OP pulm - Noted under abdominal pannus  - Nystatin powder  - Wound care consulted, f/u recs

## 2022-10-18 NOTE — PROGRESS NOTE ADULT - PROBLEM SELECTOR PLAN 4
Alk phos elevated to 137. f/u GGT to differentiate. - Cr 1.8. Baseline ~1.6  - Avoid nephrotoxins, dose per GFR

## 2022-10-18 NOTE — PROGRESS NOTE ADULT - ATTENDING COMMENTS
Patient seen and evaluated with son at bedside. Improved mentation from 10/17. + non-productive cough. Continued stable SOB. No other complaints.       #Acute decompensated heart failure  -c/w lasix to 40mg IV BID. strict I/O.  -given trend toward alkalosis, can give diamox x1 per ADVOR trial. Will be caution given concominant hypercarbia in setting of likely ENRIQUE/obesity hyperventilation syndrome.  -TTE still pending, called to expedite 10/18  . Given anasarca my suspicion is biventricular failure with cor pulmonale and severe pulm HTN being primary culprit of current anasarca.   -,will hold off acetazolamide at this time givne concominant respiratory acidosis.   - encouraged AVAPS if patient can tolerate.   -given cough, check COVID    #Hypoxic and hypercarbic respiratory failure  -treatment as above.   -in addition given elevated bicarbonate, elevated CO2, in addition to hx of snoring, daytime solmelence, obesity, suspect obesity-hyperventilation syndrome, though did not tolerate her sleep study previously.   -AVAPS as tolerated.    #CKD  -at around baseline.  -acceptable rise in effort for diuresis.  -monitor.     #Chronic pain  -continue oxycontin 60mg BID *home medication)  -continue oxycodone 10mg q4hr for mod-severe pain, hold for sedation.     #Morbid obesity  -nutrition consult.   -after acute issues can discuss with outpatient provider GLP-1.    #Elevated D-dlimer  -sent overnight, age-adjusted normal. givne subacute nature of SOB, have low suspcion for PE, is normal for age adjusted Dimer.   -duplex negative.

## 2022-10-18 NOTE — PROGRESS NOTE ADULT - PROBLEM SELECTOR PLAN 1
Patient presenting w/ dyspnea and hypoxemia despite supplemental O2, noted to be anasarcic by family and outpatient providers. Reports increased wheezing. Hx notable for asthma, HF, limited mobility. Consider HF exacerbation vs. asthma exacerbation vs. PE vs. exacerbation of pHTN. D-dimer 339, wnl for patient's age, low suspicion for PE.  - last TTE 11/2020, EF 70%, suboptimal image quality  - CXR clear, proBNP elevated to 1174, however improved from January 2022 1329  - noted to have pHTN at [facility] on 11/2020  - trops peaked 10/13, will stop trending  - BL 3-4L O2 at home, does not use CPAP/BiPAP qhs, uses daily nebulizer, not always compliant with Advair pump    - pCO2 65, pH appropriately compensated    - trialed BIPAP and AVAPS for sleep however patient did not tolerate well, will re-evaluate need PRN  - repeat TTE, check LE duplex to r/o DVT  - lasix 40mg IV BID (increased from QD 10/17) for diuresis, patient's dyspnea has not fully improved  - continue duonebs, hold off on steroids for now  - continue singulair (therapeutic interchange for Advair)  - Daily bed weights, strict I/O, fluid restrict Patient presenting w/ dyspnea and hypoxemia despite supplemental O2, noted to be anasarcic by family and outpatient providers. Reports increased wheezing. Hx notable for asthma, HF, limited mobility. Consider HF exacerbation vs. asthma exacerbation vs. PE vs. exacerbation of pHTN. D-dimer 339, wnl for patient's age, low suspicion for PE. Duplex w/o evidence of DVT  - last TTE 11/2020, EF 70%, suboptimal image quality  - CXR clear, proBNP elevated to 1174, however improved from January 2022 1329  - noted to have pHTN at [facility] on 11/2020  - trops peaked 10/13, will stop trending  - BL 3-4L O2 at home, does not use CPAP/BiPAP qhs, uses daily nebulizer, not always compliant with Advair pump    - trialed BIPAP and AVAPS for sleep however patient did not tolerate well, will re-evaluate need PRN  - repeat TTE  - lasix 40mg IV BID (increased from QD 10/17) for diuresis  - continue duonebs, hold off on steroids for now  - continue singulair (therapeutic interchange for Advair)  - Daily bed weights, strict I/O, fluid restrict

## 2022-10-18 NOTE — PROGRESS NOTE ADULT - PROBLEM SELECTOR PLAN 6
- On Toujeo 80 units AM, 84 units PM + Novolog 20 qac at home  - c/w Lantus at reduced dose to 40 qhs for now, titrate as needed  - Hold premeal insulin for now  - Low dose correctional scale - BPs well controlled  - c/w home lisinopril

## 2022-10-18 NOTE — PROGRESS NOTE ADULT - SUBJECTIVE AND OBJECTIVE BOX
Internal Medicine   Alejandro Mckeon | PGY-1    OVERNIGHT EVENTS: No acute overnight events.    SUBJECTIVE: Feels well, no acute concerns overnight. Had BM this AM.    MEDICATIONS  (STANDING):  albuterol/ipratropium for Nebulization 3 milliLiter(s) Nebulizer every 6 hours  ammonium lactate 12% Lotion 1 Application(s) Topical every 12 hours  aspirin enteric coated 81 milliGRAM(s) Oral daily  atorvastatin 80 milliGRAM(s) Oral at bedtime  budesonide 160 MICROgram(s)/formoterol 4.5 MICROgram(s) Inhaler 2 Puff(s) Inhalation two times a day  chlorhexidine 2% Cloths 1 Application(s) Topical <User Schedule>  cholecalciferol 2000 Unit(s) Oral daily  dextrose 50% Injectable 25 Gram(s) IV Push once  dextrose 50% Injectable 12.5 Gram(s) IV Push once  dextrose 50% Injectable 25 Gram(s) IV Push once  dextrose Oral Gel 15 Gram(s) Oral once  furosemide   Injectable 40 milliGRAM(s) IV Push every 12 hours  glucagon  Injectable 1 milliGRAM(s) IntraMuscular once  heparin   Injectable 5000 Unit(s) SubCutaneous every 8 hours  insulin glargine Injectable (LANTUS) 40 Unit(s) SubCutaneous at bedtime  insulin lispro (ADMELOG) corrective regimen sliding scale   SubCutaneous at bedtime  insulin lispro (ADMELOG) corrective regimen sliding scale   SubCutaneous three times a day before meals  insulin lispro Injectable (ADMELOG) 3 Unit(s) SubCutaneous three times a day before meals  ketotifen 0.025% Ophthalmic Solution 1 Drop(s) Both EYES two times a day  loratadine 10 milliGRAM(s) Oral daily  nystatin Powder 1 Application(s) Topical three times a day  oxyCODONE  ER Tablet 60 milliGRAM(s) Oral every 12 hours  polyethylene glycol 3350 17 Gram(s) Oral every 12 hours    MEDICATIONS  (PRN):  acetaminophen     Tablet .. 650 milliGRAM(s) Oral every 6 hours PRN Temp greater or equal to 38C (100.4F), Mild Pain (1 - 3)  benzocaine 15 mG/menthol 3.6 mG Lozenge 1 Lozenge Oral daily PRN Sore Throat  ondansetron    Tablet 4 milliGRAM(s) Oral every 8 hours PRN Nausea and/or Vomiting  oxyCODONE    IR 10 milliGRAM(s) Oral every 4 hours PRN Severe Pain (7 - 10)    VITALS:  T(F): 98.5 (10-18-22 @ 11:42), Max: 99.7 (10-18-22 @ 04:12)  HR: 86 (10-18-22 @ 18:29) (79 - 88)  BP: 146/68 (10-18-22 @ 18:29) (140/67 - 150/72)  BP(mean): --  RR: 18 (10-18-22 @ 18:29) (18 - 18)  SpO2: 96% (10-18-22 @ 18:29) (96% - 97%)    PHYSICAL EXAM:   GENERAL: NAD, lying in bed comfortably  HEAD: Atraumatic, normocephalic  EYES: EOMI, conjunctiva and sclera clear, no nystagmus noted  ENT: Moist mucous membranes  CHEST/LUNG: Clear to auscultation bilaterally; no rales, rhonchi, wheezing, or rubs; unlabored respirations  HEART: Regular rate and rhythm; no murmurs, rubs, or gallops, normal S1/S2  ABDOMEN: Normal bowel sounds; soft, nontender, nondistended  EXTREMITIES: No clubbing, cyanosis, or edema  MSK: No gross deformities noted   Neurological: No focal deficits   SKIN: No rashes or lesions  PSYCH: Normal mood, affect     LABS:                      11.4   6.59  )-----------( 196      ( 18 Oct 2022 09:41 )             36.9     10-18  142  |  96  |  40<H>  ----------------------------<  131<H>  4.3   |  35<H>  |  1.80<H>    Ca    9.4      18 Oct 2022 09:41  Phos  3.3     10-18  Mg     2.0     10-18    Creatinine Trend: 1.80<--, 1.69<--, 1.74<--, 1.77<--, 1.83<--, 1.78<--    I&O's Summary  17 Oct 2022 07:01  -  18 Oct 2022 07:00  --------------------------------------------------------  IN: 880 mL / OUT: 2300 mL / NET: -1420 mL    18 Oct 2022 07:01  -  18 Oct 2022 19:33  --------------------------------------------------------  IN: 640 mL / OUT: 1900 mL / NET: -1260 mL

## 2022-10-18 NOTE — PROGRESS NOTE ADULT - PROBLEM SELECTOR PLAN 5
- Cr steadily improving, now 1.69. Baseline ~1.6  - Avoid nephrotoxins, dose per GFR - On Toujeo 80 units AM, 84 units PM + Novolog 20 qac at home  - c/w Lantus at reduced dose to 40 qhs for now, titrate as needed  - Hold premeal insulin for now  - Low dose correctional scale

## 2022-10-19 LAB
ANION GAP SERPL CALC-SCNC: 12 MMOL/L — SIGNIFICANT CHANGE UP (ref 5–17)
ANION GAP SERPL CALC-SCNC: 12 MMOL/L — SIGNIFICANT CHANGE UP (ref 5–17)
BASE EXCESS BLDV CALC-SCNC: 12.1 MMOL/L — HIGH (ref -2–3)
BUN SERPL-MCNC: 48 MG/DL — HIGH (ref 7–23)
BUN SERPL-MCNC: 50 MG/DL — HIGH (ref 7–23)
CA-I SERPL-SCNC: 1.17 MMOL/L — SIGNIFICANT CHANGE UP (ref 1.15–1.33)
CALCIUM SERPL-MCNC: 9.1 MG/DL — SIGNIFICANT CHANGE UP (ref 8.4–10.5)
CALCIUM SERPL-MCNC: 9.4 MG/DL — SIGNIFICANT CHANGE UP (ref 8.4–10.5)
CHLORIDE BLDV-SCNC: 94 MMOL/L — LOW (ref 96–108)
CHLORIDE SERPL-SCNC: 91 MMOL/L — LOW (ref 96–108)
CHLORIDE SERPL-SCNC: 93 MMOL/L — LOW (ref 96–108)
CO2 BLDV-SCNC: 42 MMOL/L — HIGH (ref 22–26)
CO2 SERPL-SCNC: 31 MMOL/L — SIGNIFICANT CHANGE UP (ref 22–31)
CO2 SERPL-SCNC: 34 MMOL/L — HIGH (ref 22–31)
CREAT SERPL-MCNC: 1.86 MG/DL — HIGH (ref 0.5–1.3)
CREAT SERPL-MCNC: 1.97 MG/DL — HIGH (ref 0.5–1.3)
EGFR: 27 ML/MIN/1.73M2 — LOW
EGFR: 29 ML/MIN/1.73M2 — LOW
GAS PNL BLDV: 133 MMOL/L — LOW (ref 136–145)
GAS PNL BLDV: SIGNIFICANT CHANGE UP
GLUCOSE BLDC GLUCOMTR-MCNC: 144 MG/DL — HIGH (ref 70–99)
GLUCOSE BLDC GLUCOMTR-MCNC: 161 MG/DL — HIGH (ref 70–99)
GLUCOSE BLDC GLUCOMTR-MCNC: 166 MG/DL — HIGH (ref 70–99)
GLUCOSE BLDC GLUCOMTR-MCNC: 203 MG/DL — HIGH (ref 70–99)
GLUCOSE BLDC GLUCOMTR-MCNC: 225 MG/DL — HIGH (ref 70–99)
GLUCOSE BLDV-MCNC: 150 MG/DL — HIGH (ref 70–99)
GLUCOSE SERPL-MCNC: 169 MG/DL — HIGH (ref 70–99)
GLUCOSE SERPL-MCNC: 256 MG/DL — HIGH (ref 70–99)
HCO3 BLDV-SCNC: 40 MMOL/L — HIGH (ref 22–29)
HCT VFR BLD CALC: 38.4 % — SIGNIFICANT CHANGE UP (ref 34.5–45)
HCT VFR BLDA CALC: 38 % — SIGNIFICANT CHANGE UP (ref 34.5–46.5)
HGB BLD CALC-MCNC: 12.8 G/DL — SIGNIFICANT CHANGE UP (ref 11.7–16.1)
HGB BLD-MCNC: 11.5 G/DL — SIGNIFICANT CHANGE UP (ref 11.5–15.5)
LACTATE BLDV-MCNC: 1.8 MMOL/L — SIGNIFICANT CHANGE UP (ref 0.5–2)
MAGNESIUM SERPL-MCNC: 2 MG/DL — SIGNIFICANT CHANGE UP (ref 1.6–2.6)
MAGNESIUM SERPL-MCNC: 2 MG/DL — SIGNIFICANT CHANGE UP (ref 1.6–2.6)
MCHC RBC-ENTMCNC: 27.1 PG — SIGNIFICANT CHANGE UP (ref 27–34)
MCHC RBC-ENTMCNC: 29.9 GM/DL — LOW (ref 32–36)
MCV RBC AUTO: 90.4 FL — SIGNIFICANT CHANGE UP (ref 80–100)
NRBC # BLD: 0 /100 WBCS — SIGNIFICANT CHANGE UP (ref 0–0)
PCO2 BLDV: 64 MMHG — HIGH (ref 39–42)
PH BLDV: 7.4 — SIGNIFICANT CHANGE UP (ref 7.32–7.43)
PHOSPHATE SERPL-MCNC: 4.4 MG/DL — SIGNIFICANT CHANGE UP (ref 2.5–4.5)
PHOSPHATE SERPL-MCNC: 4.8 MG/DL — HIGH (ref 2.5–4.5)
PLATELET # BLD AUTO: 206 K/UL — SIGNIFICANT CHANGE UP (ref 150–400)
PO2 BLDV: 54 MMHG — HIGH (ref 25–45)
POTASSIUM BLDV-SCNC: 3.9 MMOL/L — SIGNIFICANT CHANGE UP (ref 3.5–5.1)
POTASSIUM SERPL-MCNC: 4.3 MMOL/L — SIGNIFICANT CHANGE UP (ref 3.5–5.3)
POTASSIUM SERPL-MCNC: 7.2 MMOL/L — CRITICAL HIGH (ref 3.5–5.3)
POTASSIUM SERPL-SCNC: 4.3 MMOL/L — SIGNIFICANT CHANGE UP (ref 3.5–5.3)
POTASSIUM SERPL-SCNC: 7.2 MMOL/L — CRITICAL HIGH (ref 3.5–5.3)
RBC # BLD: 4.25 M/UL — SIGNIFICANT CHANGE UP (ref 3.8–5.2)
RBC # FLD: 15.1 % — HIGH (ref 10.3–14.5)
SAO2 % BLDV: 86 % — SIGNIFICANT CHANGE UP (ref 67–88)
SARS-COV-2 RNA SPEC QL NAA+PROBE: DETECTED
SODIUM SERPL-SCNC: 134 MMOL/L — LOW (ref 135–145)
SODIUM SERPL-SCNC: 139 MMOL/L — SIGNIFICANT CHANGE UP (ref 135–145)
WBC # BLD: 5.88 K/UL — SIGNIFICANT CHANGE UP (ref 3.8–10.5)
WBC # FLD AUTO: 5.88 K/UL — SIGNIFICANT CHANGE UP (ref 3.8–10.5)

## 2022-10-19 PROCEDURE — 99233 SBSQ HOSP IP/OBS HIGH 50: CPT | Mod: GC

## 2022-10-19 PROCEDURE — 93306 TTE W/DOPPLER COMPLETE: CPT | Mod: 26

## 2022-10-19 RX ORDER — LACTULOSE 10 G/15ML
20 SOLUTION ORAL ONCE
Refills: 0 | Status: COMPLETED | OUTPATIENT
Start: 2022-10-19 | End: 2022-10-19

## 2022-10-19 RX ADMIN — CHLORHEXIDINE GLUCONATE 1 APPLICATION(S): 213 SOLUTION TOPICAL at 05:57

## 2022-10-19 RX ADMIN — Medication 1: at 11:39

## 2022-10-19 RX ADMIN — KETOTIFEN FUMARATE 1 DROP(S): 0.34 SOLUTION OPHTHALMIC at 05:45

## 2022-10-19 RX ADMIN — OXYCODONE HYDROCHLORIDE 10 MILLIGRAM(S): 5 TABLET ORAL at 10:00

## 2022-10-19 RX ADMIN — Medication 3 UNIT(S): at 11:38

## 2022-10-19 RX ADMIN — Medication 2: at 16:36

## 2022-10-19 RX ADMIN — ONDANSETRON 4 MILLIGRAM(S): 8 TABLET, FILM COATED ORAL at 18:20

## 2022-10-19 RX ADMIN — Medication 81 MILLIGRAM(S): at 11:37

## 2022-10-19 RX ADMIN — KETOTIFEN FUMARATE 1 DROP(S): 0.34 SOLUTION OPHTHALMIC at 18:19

## 2022-10-19 RX ADMIN — HEPARIN SODIUM 5000 UNIT(S): 5000 INJECTION INTRAVENOUS; SUBCUTANEOUS at 21:59

## 2022-10-19 RX ADMIN — Medication 3 MILLILITER(S): at 18:17

## 2022-10-19 RX ADMIN — NYSTATIN CREAM 1 APPLICATION(S): 100000 CREAM TOPICAL at 05:44

## 2022-10-19 RX ADMIN — Medication 3 MILLILITER(S): at 11:38

## 2022-10-19 RX ADMIN — POLYETHYLENE GLYCOL 3350 17 GRAM(S): 17 POWDER, FOR SOLUTION ORAL at 18:18

## 2022-10-19 RX ADMIN — HEPARIN SODIUM 5000 UNIT(S): 5000 INJECTION INTRAVENOUS; SUBCUTANEOUS at 05:43

## 2022-10-19 RX ADMIN — Medication 3 MILLILITER(S): at 05:43

## 2022-10-19 RX ADMIN — POLYETHYLENE GLYCOL 3350 17 GRAM(S): 17 POWDER, FOR SOLUTION ORAL at 05:44

## 2022-10-19 RX ADMIN — NYSTATIN CREAM 1 APPLICATION(S): 100000 CREAM TOPICAL at 22:00

## 2022-10-19 RX ADMIN — OXYCODONE HYDROCHLORIDE 10 MILLIGRAM(S): 5 TABLET ORAL at 16:00

## 2022-10-19 RX ADMIN — Medication 1: at 09:17

## 2022-10-19 RX ADMIN — Medication 2000 UNIT(S): at 11:37

## 2022-10-19 RX ADMIN — OXYCODONE HYDROCHLORIDE 60 MILLIGRAM(S): 5 TABLET ORAL at 19:00

## 2022-10-19 RX ADMIN — Medication 3 MILLILITER(S): at 22:00

## 2022-10-19 RX ADMIN — BUDESONIDE AND FORMOTEROL FUMARATE DIHYDRATE 2 PUFF(S): 160; 4.5 AEROSOL RESPIRATORY (INHALATION) at 05:44

## 2022-10-19 RX ADMIN — HEPARIN SODIUM 5000 UNIT(S): 5000 INJECTION INTRAVENOUS; SUBCUTANEOUS at 14:54

## 2022-10-19 RX ADMIN — OXYCODONE HYDROCHLORIDE 60 MILLIGRAM(S): 5 TABLET ORAL at 06:12

## 2022-10-19 RX ADMIN — Medication 40 MILLIGRAM(S): at 18:20

## 2022-10-19 RX ADMIN — LORATADINE 10 MILLIGRAM(S): 10 TABLET ORAL at 11:37

## 2022-10-19 RX ADMIN — OXYCODONE HYDROCHLORIDE 10 MILLIGRAM(S): 5 TABLET ORAL at 09:18

## 2022-10-19 RX ADMIN — Medication 3 UNIT(S): at 09:17

## 2022-10-19 RX ADMIN — INSULIN GLARGINE 40 UNIT(S): 100 INJECTION, SOLUTION SUBCUTANEOUS at 21:59

## 2022-10-19 RX ADMIN — LACTULOSE 20 GRAM(S): 10 SOLUTION ORAL at 18:19

## 2022-10-19 RX ADMIN — ATORVASTATIN CALCIUM 80 MILLIGRAM(S): 80 TABLET, FILM COATED ORAL at 21:59

## 2022-10-19 RX ADMIN — NYSTATIN CREAM 1 APPLICATION(S): 100000 CREAM TOPICAL at 14:54

## 2022-10-19 RX ADMIN — Medication 3 UNIT(S): at 16:36

## 2022-10-19 RX ADMIN — Medication 40 MILLIGRAM(S): at 05:42

## 2022-10-19 RX ADMIN — BUDESONIDE AND FORMOTEROL FUMARATE DIHYDRATE 2 PUFF(S): 160; 4.5 AEROSOL RESPIRATORY (INHALATION) at 18:18

## 2022-10-19 RX ADMIN — Medication 1 APPLICATION(S): at 09:18

## 2022-10-19 RX ADMIN — OXYCODONE HYDROCHLORIDE 10 MILLIGRAM(S): 5 TABLET ORAL at 15:15

## 2022-10-19 RX ADMIN — Medication 1 APPLICATION(S): at 18:19

## 2022-10-19 RX ADMIN — OXYCODONE HYDROCHLORIDE 60 MILLIGRAM(S): 5 TABLET ORAL at 18:18

## 2022-10-19 NOTE — CHART NOTE - NSCHARTNOTEFT_GEN_A_CORE
WEnt back to see patient after rounds, still obtunded while on BIPAP. PAtient with low blood pressure to 80's systolic, confirmed with manual bp and saturations decreased to 86% on 30% Fi02 on AVAPS. On exam patient with pupils both reactive to light, minimal reaction to sternal rub. +S1S2 rrr, irregular at times, lungs with decreased air movement. Abdomen soft, hypoactive bowel sounds, LE without edema. Thigh compartments soft. Skin without new ecchymosis in flanks abdomen or thighs.   Initially fluid changed to bolus, ECG obtained, heparin gtt stopped, CT a/p non con ordered to r/o bleed and MICU consult called. Patient remained hypotensive so RRT called. Temp (rectal 99.1), Unofficial POCUS with LV with normal EF, RV<LV, plethoric IVC. Vanc added for possible HAP coverage, patient received another liter of LR without improvement of BP. PAtient placed on levophed and admitted to ICU. Given improving hypercarbia, normalization of lactate and no fever, concern for hemorrhagic vs. neurogenic cause of hypotension. If possible recommended CT head/abd/pelvis prior to MICU but defer to excellent care of ICU staff.  Critical care time spent 50 minutes WEnt back to see patient after rounds, still obtunded while on BIPAP. PAtient with low blood pressure to 80's systolic, confirmed with manual bp and saturations decreased to 86% on 30% Fi02 on AVAPS. On exam patient with pupils both reactive to light, minimal reaction to sternal rub. +S1S2 rrr, irregular at times, lungs with decreased air movement. Abdomen soft, hypoactive bowel sounds, LE without edema. Thigh compartments soft. Skin without new ecchymosis in flanks abdomen or thighs.   Initially fluid changed to bolus, ECG obtained, heparin gtt stopped, CT a/p non con ordered to r/o bleed and MICU consult called. Patient remained hypotensive so RRT called. Temp (rectal 99.1), Unofficial POCUS with LV with normal EF, RV<LV, plethoric IVC. Vanc added for possible HAP coverage, patient received another liter of LR without improvement of BP. PAtient placed on levophed and admitted to ICU. Given improving hypercarbia, normalization of lactate and no fever, concern for hemorrhagic vs. neurogenic cause of hypotension. Given relative normal saturatings, RV<LV, was just on hep gtt, less concerned about massive PE and obstructive shock. On Unofficial PocUS, with flow there was no evidence of VSD/ASD as late complication of NSTEMI.  If possible recommended CT head/abd/pelvis prior to MICU but defer to excellent care of ICU staff.  Critical care time spent 50 minutes

## 2022-10-19 NOTE — PROGRESS NOTE ADULT - PROBLEM SELECTOR PLAN 9
Diet: DASH/CC  DVT PPx: subQ heparin  Dispo: Pending medical course  GOC: Patient states she has advanced directives/will at home. DNR with trial of intubation. Patient's son to bring in paperwork from home Diet: DASH/CC  DVT PPx: subQ heparin  GI PPx: miralax + lactulose (per pt pref)  Dispo: Pending medical course  GOC: Patient states she has advanced directives/will at home. DNR with trial of intubation. Patient's son to bring in paperwork from home

## 2022-10-19 NOTE — PROGRESS NOTE ADULT - SUBJECTIVE AND OBJECTIVE BOX
Internal Medicine   Alejandro Mckeon | PGY-1    OVERNIGHT EVENTS: No acute overnight events.    SUBJECTIVE: Feels well. Reports two episodes of shortness of breath yesterday, occurred spontaneously without chest pain, lasted <1 min. Denies fevers, chest pain. Shortness of breath persists with exertion. Was unable to sleep last night due to neighbor.    MEDICATIONS  (STANDING):  albuterol/ipratropium for Nebulization 3 milliLiter(s) Nebulizer every 6 hours  ammonium lactate 12% Lotion 1 Application(s) Topical every 12 hours  aspirin enteric coated 81 milliGRAM(s) Oral daily  atorvastatin 80 milliGRAM(s) Oral at bedtime  budesonide 160 MICROgram(s)/formoterol 4.5 MICROgram(s) Inhaler 2 Puff(s) Inhalation two times a day  chlorhexidine 2% Cloths 1 Application(s) Topical <User Schedule>  cholecalciferol 2000 Unit(s) Oral daily  dextrose 50% Injectable 25 Gram(s) IV Push once  dextrose 50% Injectable 12.5 Gram(s) IV Push once  dextrose 50% Injectable 25 Gram(s) IV Push once  dextrose Oral Gel 15 Gram(s) Oral once  furosemide   Injectable 40 milliGRAM(s) IV Push every 12 hours  glucagon  Injectable 1 milliGRAM(s) IntraMuscular once  heparin   Injectable 5000 Unit(s) SubCutaneous every 8 hours  insulin glargine Injectable (LANTUS) 40 Unit(s) SubCutaneous at bedtime  insulin lispro (ADMELOG) corrective regimen sliding scale   SubCutaneous three times a day before meals  insulin lispro (ADMELOG) corrective regimen sliding scale   SubCutaneous at bedtime  insulin lispro Injectable (ADMELOG) 3 Unit(s) SubCutaneous three times a day before meals  ketotifen 0.025% Ophthalmic Solution 1 Drop(s) Both EYES two times a day  loratadine 10 milliGRAM(s) Oral daily  nystatin Powder 1 Application(s) Topical three times a day  oxyCODONE  ER Tablet 60 milliGRAM(s) Oral every 12 hours  polyethylene glycol 3350 17 Gram(s) Oral every 12 hours    MEDICATIONS  (PRN):  acetaminophen     Tablet .. 650 milliGRAM(s) Oral every 6 hours PRN Temp greater or equal to 38C (100.4F), Mild Pain (1 - 3)  benzocaine 15 mG/menthol 3.6 mG Lozenge 1 Lozenge Oral daily PRN Sore Throat  lactulose Syrup 20 Gram(s) Oral once PRN constipation  ondansetron    Tablet 4 milliGRAM(s) Oral every 8 hours PRN Nausea and/or Vomiting  oxyCODONE    IR 10 milliGRAM(s) Oral every 4 hours PRN Severe Pain (7 - 10)    VITALS:  T(F): 98.1 (10-19-22 @ 11:32), Max: 98.4 (10-19-22 @ 04:32)  HR: 83 (10-19-22 @ 11:32) (74 - 86)  BP: 121/78 (10-19-22 @ 11:32) (121/78 - 146/68)  BP(mean): --  RR: 18 (10-19-22 @ 11:32) (18 - 18)  SpO2: 96% (10-19-22 @ 11:32) (96% - 96%)    PHYSICAL EXAM:   GENERAL: morbidly obese, NAD, resting in bed, appears diaphoretic but comfortable  HEAD: Atraumatic, normocephalic  EYES: EOMI, conjunctiva and sclera clear, no nystagmus noted  ENT: Moist mucous membranes  CHEST/LUNG: Clear to auscultation bilaterally though limited auscultation; no rales, rhonchi, wheezing, or rubs; unlabored respirations  HEART: Regular rate and rhythm though difficult auscultation; no murmurs, rubs, or gallops, normal S1/S2  ABDOMEN: firm pannus; otherwise soft, nontender, nondistended  EXTREMITIES: 2+/3+ pitting edema in bilateral LEs. No clubbing or cyanosis  MSK: No gross deformities noted  NEURO: Grossly nonfocal  SKIN: Dermatitis/erythema in bilateral lower extremities  PSYCH: Appropriate mood, affect    LABS:                      11.5   5.88  )-----------( 206      ( 19 Oct 2022 16:12 )             38.4     10-18  142  |  96  |  40<H>  ----------------------------<  131<H>  4.3   |  35<H>  |  1.80<H>    Ca    9.4      18 Oct 2022 09:41  Phos  3.3     10-18  Mg     2.0     10-18    Blood Gas Profile w/Lytes - Venous: Performed In Lab (10-19-22 @ 10:30)    Creatinine Trend: 1.80<--, 1.69<--, 1.74<--, 1.77<--, 1.83<--, 1.78<--  I&O's Summary  18 Oct 2022 07:01  -  19 Oct 2022 07:00  --------------------------------------------------------  IN: 640 mL / OUT: 3800 mL / NET: -3160 mL    19 Oct 2022 07:01  -  19 Oct 2022 17:12  --------------------------------------------------------  IN: 480 mL / OUT: 1800 mL / NET: -1320 mL    Blood Gas Profile w/Lytes - Venous: Performed In Lab (10-19-22 @ 10:30)

## 2022-10-19 NOTE — PROGRESS NOTE ADULT - PROBLEM SELECTOR PLAN 1
Patient presenting w/ dyspnea and hypoxemia despite supplemental O2, noted to be anasarcic by family and outpatient providers. Reports increased wheezing. Hx notable for asthma, HF, limited mobility. Consider HF exacerbation vs. asthma exacerbation vs. PE vs. exacerbation of pHTN. D-dimer 339, wnl for patient's age, low suspicion for PE. Duplex w/o evidence of DVT  - last TTE 11/2020, EF 70%, suboptimal image quality  - CXR clear, proBNP elevated to 1174, however improved from January 2022 1329  - noted to have pHTN at [facility] on 11/2020  - trops peaked 10/13, will stop trending  - BL 3-4L O2 at home, does not use CPAP/BiPAP qhs, uses daily nebulizer, not always compliant with Advair pump    - trialed BIPAP and AVAPS for sleep however patient did not tolerate well, will re-evaluate need PRN  - repeat TTE  - lasix 40mg IV BID (increased from QD 10/17) for diuresis  - continue duonebs, hold off on steroids for now  - continue singulair (therapeutic interchange for Advair)  - Daily bed weights, strict I/O, fluid restrict Patient presenting w/ dyspnea and hypoxemia despite supplemental O2, noted to be anasarcic by family and outpatient providers. Reports increased wheezing. Hx notable for asthma, HF, limited mobility. Consider HF exacerbation vs. asthma exacerbation vs. PE vs. exacerbation of pHTN. D-dimer 339, wnl for patient's age, low suspicion for PE. Duplex w/o evidence of DVT  - last TTE 11/2020, EF 70%, suboptimal image quality  - CXR clear, proBNP elevated to 1174, however improved from January 2022 1329  - noted to have pHTN at [facility] on 11/2020  - trops peaked 10/13, will stop trending  - BL 3-4L O2 at home, does not use CPAP/BiPAP qhs, uses daily nebulizer, not always compliant with Advair pump    - trialed BIPAP and AVAPS for sleep however patient did not tolerate well, will re-evaluate need PRN  - repeat TTE today, f/u read  - lasix 40mg IV BID (increased from QD 10/17) for diuresis  - continue duonebs, hold off on steroids for now  - continue singulair (therapeutic interchange for Advair)  - Daily bed weights, strict I/O, fluid restrict

## 2022-10-19 NOTE — PROGRESS NOTE ADULT - PROBLEM SELECTOR PLAN 3
Alk phos elevated to 137. GGT 79 suggests hepatic source however likely elevated 2/2 obesity as AST/ALT and bili wnl.

## 2022-10-19 NOTE — PROGRESS NOTE ADULT - ATTENDING COMMENTS
Patient seen and evaluated with son at bedside. Improved mentation from 10/17. + non-productive cough. Continued stable SOB. No other complaints.       #Acute decompensated heart failure  -c/w lasix to 40mg IV BID. strict I/O.  -good UOP with diamox on 10/18, await labs with blood gas to determine whether to give another.  -TTE still pending, still attempting to expedite.  . Given anasarca my suspicion is biventricular failure with cor pulmonale and severe pulm HTN being primary culprit of current anasarca.   -,will hold off acetazolamide at this time givne concominant respiratory acidosis.   - encouraged AVAPS if patient can tolerate.   -given cough, check COVID  -despite fluid removal of at least 8L, no real improvement in respiratory status. Will continue to diurese, await TTE and pending that will need to determine next steps.     #Hypoxic and hypercarbic respiratory failure  -treatment as above.   -in addition given elevated bicarbonate, elevated CO2, in addition to hx of snoring, daytime solmelence, obesity, suspect obesity-hyperventilation syndrome, though did not tolerate her sleep study previously.   -AVAPS if patient willing to attempt again.    #CKD  -at around baseline.  -acceptable rise in effort for diuresis.  -monitor.     #Chronic pain  -continue oxycontin 60mg BID *home medication)  -continue oxycodone 10mg q4hr for mod-severe pain, hold for sedation.     #Morbid obesity  -nutrition consult.   -after acute issues can discuss with outpatient provider GLP-1.    #Elevated D-dlimer  -sent overnight, age-adjusted normal. givne subacute nature of SOB, have low suspcion for PE, is normal for age adjusted Dimer.   -duplex negative.

## 2022-10-20 DIAGNOSIS — U07.1 COVID-19: ICD-10-CM

## 2022-10-20 LAB
ANION GAP SERPL CALC-SCNC: 13 MMOL/L — SIGNIFICANT CHANGE UP (ref 5–17)
BASE EXCESS BLDV CALC-SCNC: 10.7 MMOL/L — HIGH (ref -2–3)
BASE EXCESS BLDV CALC-SCNC: 12.3 MMOL/L — HIGH (ref -2–3)
BUN SERPL-MCNC: 50 MG/DL — HIGH (ref 7–23)
CA-I SERPL-SCNC: 1.17 MMOL/L — SIGNIFICANT CHANGE UP (ref 1.15–1.33)
CALCIUM SERPL-MCNC: 9.6 MG/DL — SIGNIFICANT CHANGE UP (ref 8.4–10.5)
CHLORIDE BLDV-SCNC: 93 MMOL/L — LOW (ref 96–108)
CHLORIDE SERPL-SCNC: 93 MMOL/L — LOW (ref 96–108)
CO2 BLDV-SCNC: 40 MMOL/L — HIGH (ref 22–26)
CO2 BLDV-SCNC: 43 MMOL/L — HIGH (ref 22–26)
CO2 SERPL-SCNC: 34 MMOL/L — HIGH (ref 22–31)
CREAT SERPL-MCNC: 1.94 MG/DL — HIGH (ref 0.5–1.3)
CRP SERPL-MCNC: 29 MG/L — HIGH (ref 0–4)
D DIMER BLD IA.RAPID-MCNC: 400 NG/ML DDU — HIGH
EGFR: 28 ML/MIN/1.73M2 — LOW
GAS PNL BLDV: 135 MMOL/L — LOW (ref 136–145)
GAS PNL BLDV: SIGNIFICANT CHANGE UP
GAS PNL BLDV: SIGNIFICANT CHANGE UP
GLUCOSE BLDC GLUCOMTR-MCNC: 224 MG/DL — HIGH (ref 70–99)
GLUCOSE BLDC GLUCOMTR-MCNC: 234 MG/DL — HIGH (ref 70–99)
GLUCOSE BLDC GLUCOMTR-MCNC: 268 MG/DL — HIGH (ref 70–99)
GLUCOSE BLDC GLUCOMTR-MCNC: 307 MG/DL — HIGH (ref 70–99)
GLUCOSE BLDC GLUCOMTR-MCNC: 329 MG/DL — HIGH (ref 70–99)
GLUCOSE BLDV-MCNC: 291 MG/DL — HIGH (ref 70–99)
GLUCOSE SERPL-MCNC: 214 MG/DL — HIGH (ref 70–99)
HCO3 BLDV-SCNC: 38 MMOL/L — HIGH (ref 22–29)
HCO3 BLDV-SCNC: 41 MMOL/L — HIGH (ref 22–29)
HCT VFR BLD CALC: 42.1 % — SIGNIFICANT CHANGE UP (ref 34.5–45)
HCT VFR BLDA CALC: 37 % — SIGNIFICANT CHANGE UP (ref 34.5–46.5)
HGB BLD CALC-MCNC: 12.3 G/DL — SIGNIFICANT CHANGE UP (ref 11.7–16.1)
HGB BLD-MCNC: 12.7 G/DL — SIGNIFICANT CHANGE UP (ref 11.5–15.5)
LACTATE BLDV-MCNC: 2.1 MMOL/L — HIGH (ref 0.5–2)
LDH SERPL L TO P-CCNC: 269 U/L — HIGH (ref 50–242)
MAGNESIUM SERPL-MCNC: 2.1 MG/DL — SIGNIFICANT CHANGE UP (ref 1.6–2.6)
MCHC RBC-ENTMCNC: 27.3 PG — SIGNIFICANT CHANGE UP (ref 27–34)
MCHC RBC-ENTMCNC: 30.2 GM/DL — LOW (ref 32–36)
MCV RBC AUTO: 90.5 FL — SIGNIFICANT CHANGE UP (ref 80–100)
NRBC # BLD: 0 /100 WBCS — SIGNIFICANT CHANGE UP (ref 0–0)
PCO2 BLDV: 65 MMHG — HIGH (ref 39–42)
PCO2 BLDV: 74 MMHG — HIGH (ref 39–42)
PH BLDV: 7.35 — SIGNIFICANT CHANGE UP (ref 7.32–7.43)
PH BLDV: 7.38 — SIGNIFICANT CHANGE UP (ref 7.32–7.43)
PHOSPHATE SERPL-MCNC: 4.3 MG/DL — SIGNIFICANT CHANGE UP (ref 2.5–4.5)
PLATELET # BLD AUTO: 203 K/UL — SIGNIFICANT CHANGE UP (ref 150–400)
PO2 BLDV: 41 MMHG — SIGNIFICANT CHANGE UP (ref 25–45)
PO2 BLDV: 60 MMHG — HIGH (ref 25–45)
POTASSIUM BLDV-SCNC: 3.7 MMOL/L — SIGNIFICANT CHANGE UP (ref 3.5–5.1)
POTASSIUM SERPL-MCNC: 4 MMOL/L — SIGNIFICANT CHANGE UP (ref 3.5–5.3)
POTASSIUM SERPL-SCNC: 4 MMOL/L — SIGNIFICANT CHANGE UP (ref 3.5–5.3)
RBC # BLD: 4.65 M/UL — SIGNIFICANT CHANGE UP (ref 3.8–5.2)
RBC # FLD: 15.1 % — HIGH (ref 10.3–14.5)
SAO2 % BLDV: 67.1 % — SIGNIFICANT CHANGE UP (ref 67–88)
SAO2 % BLDV: 88.2 % — HIGH (ref 67–88)
SARS-COV-2 RNA SPEC QL NAA+PROBE: DETECTED
SODIUM SERPL-SCNC: 140 MMOL/L — SIGNIFICANT CHANGE UP (ref 135–145)
WBC # BLD: 4.76 K/UL — SIGNIFICANT CHANGE UP (ref 3.8–10.5)
WBC # FLD AUTO: 4.76 K/UL — SIGNIFICANT CHANGE UP (ref 3.8–10.5)

## 2022-10-20 PROCEDURE — 99233 SBSQ HOSP IP/OBS HIGH 50: CPT | Mod: GC

## 2022-10-20 RX ORDER — REMDESIVIR 5 MG/ML
100 INJECTION INTRAVENOUS EVERY 24 HOURS
Refills: 0 | Status: COMPLETED | OUTPATIENT
Start: 2022-10-21 | End: 2022-10-24

## 2022-10-20 RX ORDER — REMDESIVIR 5 MG/ML
200 INJECTION INTRAVENOUS EVERY 24 HOURS
Refills: 0 | Status: COMPLETED | OUTPATIENT
Start: 2022-10-20 | End: 2022-10-20

## 2022-10-20 RX ORDER — REMDESIVIR 5 MG/ML
INJECTION INTRAVENOUS
Refills: 0 | Status: COMPLETED | OUTPATIENT
Start: 2022-10-20 | End: 2022-10-24

## 2022-10-20 RX ORDER — OXYCODONE HYDROCHLORIDE 5 MG/1
10 TABLET ORAL EVERY 4 HOURS
Refills: 0 | Status: DISCONTINUED | OUTPATIENT
Start: 2022-10-20 | End: 2022-10-26

## 2022-10-20 RX ORDER — OXYCODONE HYDROCHLORIDE 5 MG/1
60 TABLET ORAL EVERY 12 HOURS
Refills: 0 | Status: DISCONTINUED | OUTPATIENT
Start: 2022-10-20 | End: 2022-10-26

## 2022-10-20 RX ADMIN — Medication 3 UNIT(S): at 17:13

## 2022-10-20 RX ADMIN — OXYCODONE HYDROCHLORIDE 10 MILLIGRAM(S): 5 TABLET ORAL at 11:36

## 2022-10-20 RX ADMIN — OXYCODONE HYDROCHLORIDE 60 MILLIGRAM(S): 5 TABLET ORAL at 17:26

## 2022-10-20 RX ADMIN — Medication 2: at 23:39

## 2022-10-20 RX ADMIN — REMDESIVIR 500 MILLIGRAM(S): 5 INJECTION INTRAVENOUS at 17:27

## 2022-10-20 RX ADMIN — Medication 2: at 08:03

## 2022-10-20 RX ADMIN — ONDANSETRON 4 MILLIGRAM(S): 8 TABLET, FILM COATED ORAL at 09:53

## 2022-10-20 RX ADMIN — OXYCODONE HYDROCHLORIDE 10 MILLIGRAM(S): 5 TABLET ORAL at 20:21

## 2022-10-20 RX ADMIN — Medication 2000 UNIT(S): at 11:32

## 2022-10-20 RX ADMIN — Medication 3 MILLILITER(S): at 23:41

## 2022-10-20 RX ADMIN — Medication 2: at 17:13

## 2022-10-20 RX ADMIN — OXYCODONE HYDROCHLORIDE 60 MILLIGRAM(S): 5 TABLET ORAL at 06:22

## 2022-10-20 RX ADMIN — OXYCODONE HYDROCHLORIDE 10 MILLIGRAM(S): 5 TABLET ORAL at 21:22

## 2022-10-20 RX ADMIN — Medication 3 MILLILITER(S): at 05:11

## 2022-10-20 RX ADMIN — Medication 3 MILLILITER(S): at 17:26

## 2022-10-20 RX ADMIN — HEPARIN SODIUM 5000 UNIT(S): 5000 INJECTION INTRAVENOUS; SUBCUTANEOUS at 23:39

## 2022-10-20 RX ADMIN — OXYCODONE HYDROCHLORIDE 10 MILLIGRAM(S): 5 TABLET ORAL at 16:10

## 2022-10-20 RX ADMIN — KETOTIFEN FUMARATE 1 DROP(S): 0.34 SOLUTION OPHTHALMIC at 17:27

## 2022-10-20 RX ADMIN — NYSTATIN CREAM 1 APPLICATION(S): 100000 CREAM TOPICAL at 13:52

## 2022-10-20 RX ADMIN — LORATADINE 10 MILLIGRAM(S): 10 TABLET ORAL at 11:33

## 2022-10-20 RX ADMIN — KETOTIFEN FUMARATE 1 DROP(S): 0.34 SOLUTION OPHTHALMIC at 05:13

## 2022-10-20 RX ADMIN — Medication 81 MILLIGRAM(S): at 11:31

## 2022-10-20 RX ADMIN — Medication 3: at 12:32

## 2022-10-20 RX ADMIN — Medication 3 UNIT(S): at 08:03

## 2022-10-20 RX ADMIN — INSULIN GLARGINE 40 UNIT(S): 100 INJECTION, SOLUTION SUBCUTANEOUS at 23:40

## 2022-10-20 RX ADMIN — Medication 40 MILLIGRAM(S): at 05:12

## 2022-10-20 RX ADMIN — Medication 3 UNIT(S): at 12:32

## 2022-10-20 RX ADMIN — OXYCODONE HYDROCHLORIDE 60 MILLIGRAM(S): 5 TABLET ORAL at 05:19

## 2022-10-20 RX ADMIN — HEPARIN SODIUM 5000 UNIT(S): 5000 INJECTION INTRAVENOUS; SUBCUTANEOUS at 05:12

## 2022-10-20 RX ADMIN — CHLORHEXIDINE GLUCONATE 1 APPLICATION(S): 213 SOLUTION TOPICAL at 05:14

## 2022-10-20 RX ADMIN — POLYETHYLENE GLYCOL 3350 17 GRAM(S): 17 POWDER, FOR SOLUTION ORAL at 05:13

## 2022-10-20 RX ADMIN — Medication 3 MILLILITER(S): at 11:31

## 2022-10-20 RX ADMIN — OXYCODONE HYDROCHLORIDE 10 MILLIGRAM(S): 5 TABLET ORAL at 09:45

## 2022-10-20 RX ADMIN — HEPARIN SODIUM 5000 UNIT(S): 5000 INJECTION INTRAVENOUS; SUBCUTANEOUS at 13:55

## 2022-10-20 RX ADMIN — Medication 40 MILLIGRAM(S): at 17:25

## 2022-10-20 RX ADMIN — ATORVASTATIN CALCIUM 80 MILLIGRAM(S): 80 TABLET, FILM COATED ORAL at 23:40

## 2022-10-20 RX ADMIN — Medication 1 APPLICATION(S): at 17:26

## 2022-10-20 RX ADMIN — OXYCODONE HYDROCHLORIDE 10 MILLIGRAM(S): 5 TABLET ORAL at 17:22

## 2022-10-20 RX ADMIN — NYSTATIN CREAM 1 APPLICATION(S): 100000 CREAM TOPICAL at 05:12

## 2022-10-20 RX ADMIN — BUDESONIDE AND FORMOTEROL FUMARATE DIHYDRATE 2 PUFF(S): 160; 4.5 AEROSOL RESPIRATORY (INHALATION) at 05:13

## 2022-10-20 RX ADMIN — NYSTATIN CREAM 1 APPLICATION(S): 100000 CREAM TOPICAL at 23:41

## 2022-10-20 RX ADMIN — Medication 1 APPLICATION(S): at 05:13

## 2022-10-20 RX ADMIN — BUDESONIDE AND FORMOTEROL FUMARATE DIHYDRATE 2 PUFF(S): 160; 4.5 AEROSOL RESPIRATORY (INHALATION) at 17:25

## 2022-10-20 NOTE — PROGRESS NOTE ADULT - ATTENDING COMMENTS
Patient seen and evaluated with son at bedside. Found to be COVID +, nosocomial. +hoarse voice, stable hypoxia.      #Acute decompensated heart failure  -c/w lasix to 40mg IV BID. strict I/O.  -robust UOP 10/19, maintaining electrolytes and ceratinine grossly stable. Hold diamox 10/20  -TTE technically difficult, no good views of RV, grossly normal LV.   .will still treat as hypervolemia, though if not improved after COVID symptoms improved, will ask pulmonary to weigh in to look for non-cardiogenic causes of hypoxia.  - encouraged AVAPS if patient can tolerate.     #COVID  -high risk, but chronically hypoxic.   -check inflammatory markers. If elevated will start dexamethasone, but if not will treat with remdesivir only.   -Dimer <2x ULN, no therapeutic lovenox.     #Hypoxic and hypercarbic respiratory failure  -treatment as above.   -in addition given elevated bicarbonate, elevated CO2, in addition to hx of snoring, daytime solmelence, obesity, suspect obesity-hyperventilation syndrome, though did not tolerate her sleep study previously.   -AVAPS if patient willing to attempt again.    #CKD  -at around baseline.  -acceptable rise in effort for diuresis.  -monitor.     #Chronic pain  -continue oxycontin 60mg BID *home medication)  -continue oxycodone 10mg q4hr for mod-severe pain, hold for sedation.     #Morbid obesity  -nutrition consult.   -after acute issues can discuss with outpatient provider GLP-1.

## 2022-10-20 NOTE — PROGRESS NOTE ADULT - PROBLEM SELECTOR PLAN 4
- Cr 1.8. Baseline ~1.6  - Avoid nephrotoxins, dose per GFR - Cr ~1.9. Baseline ~1.6  - Avoid nephrotoxins, dose per GFR Alk phos elevated to 137. GGT 79 suggests hepatic source however likely elevated 2/2 obesity as AST/ALT and bili wnl.

## 2022-10-20 NOTE — PROGRESS NOTE ADULT - PROBLEM SELECTOR PLAN 10
DVT PPx: subQ heparin  Diet: DASH/CC  GI PPx: miralax + lactulose (per pt pref)  Dispo: Pending medical course  GOC: Patient states she has advanced directives/will at home. DNR with trial of intubation. Patient's son to bring in paperwork from home

## 2022-10-20 NOTE — PROGRESS NOTE ADULT - PROBLEM SELECTOR PLAN 3
Alk phos elevated to 137. GGT 79 suggests hepatic source however likely elevated 2/2 obesity as AST/ALT and bili wnl. Patient w/ chronic lower back and leg pain 2/2 spinal stenosis. Largely bedbound at baseline. Has oxycontin 60mg Q12H at home, per patient recently dc'ed short acting meds upon discussion w/ her internist as she did not want to feel drowsy.  - continue home oxycontin 60mg Q12H  - oxy 10mg PO Q4H PRN for breakthrough pain

## 2022-10-20 NOTE — PROGRESS NOTE ADULT - SUBJECTIVE AND OBJECTIVE BOX
Internal Medicine   Alejandro Mckeon | PGY-1    OVERNIGHT EVENTS: No acute overnight events.    SUBJECTIVE:       MEDICATIONS  (STANDING):  albuterol/ipratropium for Nebulization 3 milliLiter(s) Nebulizer every 6 hours  ammonium lactate 12% Lotion 1 Application(s) Topical every 12 hours  aspirin enteric coated 81 milliGRAM(s) Oral daily  atorvastatin 80 milliGRAM(s) Oral at bedtime  budesonide 160 MICROgram(s)/formoterol 4.5 MICROgram(s) Inhaler 2 Puff(s) Inhalation two times a day  chlorhexidine 2% Cloths 1 Application(s) Topical <User Schedule>  cholecalciferol 2000 Unit(s) Oral daily  dextrose 50% Injectable 25 Gram(s) IV Push once  dextrose 50% Injectable 12.5 Gram(s) IV Push once  dextrose 50% Injectable 25 Gram(s) IV Push once  dextrose Oral Gel 15 Gram(s) Oral once  furosemide   Injectable 40 milliGRAM(s) IV Push every 12 hours  glucagon  Injectable 1 milliGRAM(s) IntraMuscular once  heparin   Injectable 5000 Unit(s) SubCutaneous every 8 hours  insulin glargine Injectable (LANTUS) 40 Unit(s) SubCutaneous at bedtime  insulin lispro (ADMELOG) corrective regimen sliding scale   SubCutaneous three times a day before meals  insulin lispro (ADMELOG) corrective regimen sliding scale   SubCutaneous at bedtime  insulin lispro Injectable (ADMELOG) 3 Unit(s) SubCutaneous three times a day before meals  ketotifen 0.025% Ophthalmic Solution 1 Drop(s) Both EYES two times a day  loratadine 10 milliGRAM(s) Oral daily  nystatin Powder 1 Application(s) Topical three times a day  oxyCODONE  ER Tablet 60 milliGRAM(s) Oral every 12 hours  polyethylene glycol 3350 17 Gram(s) Oral every 12 hours    MEDICATIONS  (PRN):  acetaminophen     Tablet .. 650 milliGRAM(s) Oral every 6 hours PRN Temp greater or equal to 38C (100.4F), Mild Pain (1 - 3)  benzocaine 15 mG/menthol 3.6 mG Lozenge 1 Lozenge Oral daily PRN Sore Throat  ondansetron    Tablet 4 milliGRAM(s) Oral every 8 hours PRN Nausea and/or Vomiting  oxyCODONE    IR 10 milliGRAM(s) Oral every 4 hours PRN Severe Pain (7 - 10)    VITALS:  T(F): 97.9 (10-20-22 @ 06:05), Max: 98.2 (10-19-22 @ 20:07)  HR: 68 (10-20-22 @ 06:05) (68 - 83)  BP: 119/72 (10-20-22 @ 06:05) (119/72 - 141/62)  BP(mean): --  RR: 18 (10-20-22 @ 06:05) (18 - 18)  SpO2: 98% (10-20-22 @ 06:05) (93% - 98%)    PHYSICAL EXAM:   GENERAL: NAD, lying in bed comfortably  HEAD: Atraumatic, normocephalic  EYES: EOMI, conjunctiva and sclera clear, no nystagmus noted  ENT: Moist mucous membranes  CHEST/LUNG: Clear to auscultation bilaterally; no rales, rhonchi, wheezing, or rubs; unlabored respirations  HEART: Regular rate and rhythm; no murmurs, rubs, or gallops, normal S1/S2  ABDOMEN: Normal bowel sounds; soft, nontender, nondistended  EXTREMITIES: No clubbing, cyanosis, or edema  MSK: No gross deformities noted   NEURO: No focal deficits   SKIN: No rashes or lesions  PSYCH: Normal mood, affect     LABS:                      12.7   4.76  )-----------( 203      ( 20 Oct 2022 05:26 )             42.1     10-20  140  |  93<L>  |  50<H>  ----------------------------<  214<H>  4.0   |  34<H>  |  1.94<H>    Ca    9.6      20 Oct 2022 05:26  Phos  4.3     10-20  Mg     2.1     10-20    Creatinine Trend: 1.94<--, 1.97<--, 1.86<--, 1.80<--, 1.69<--, 1.74<--    I&O's Summary  19 Oct 2022 07:01  -  20 Oct 2022 07:00  --------------------------------------------------------  IN: 720 mL / OUT: 5600 mL / NET: -9811 mL Internal Medicine   Alejandro Mckeon | PGY-1    OVERNIGHT EVENTS: No acute overnight events.    SUBJECTIVE:       MEDICATIONS  (STANDING):  albuterol/ipratropium for Nebulization 3 milliLiter(s) Nebulizer every 6 hours  ammonium lactate 12% Lotion 1 Application(s) Topical every 12 hours  aspirin enteric coated 81 milliGRAM(s) Oral daily  atorvastatin 80 milliGRAM(s) Oral at bedtime  budesonide 160 MICROgram(s)/formoterol 4.5 MICROgram(s) Inhaler 2 Puff(s) Inhalation two times a day  chlorhexidine 2% Cloths 1 Application(s) Topical <User Schedule>  cholecalciferol 2000 Unit(s) Oral daily  dextrose 50% Injectable 25 Gram(s) IV Push once  dextrose 50% Injectable 12.5 Gram(s) IV Push once  dextrose 50% Injectable 25 Gram(s) IV Push once  dextrose Oral Gel 15 Gram(s) Oral once  furosemide   Injectable 40 milliGRAM(s) IV Push every 12 hours  glucagon  Injectable 1 milliGRAM(s) IntraMuscular once  heparin   Injectable 5000 Unit(s) SubCutaneous every 8 hours  insulin glargine Injectable (LANTUS) 40 Unit(s) SubCutaneous at bedtime  insulin lispro (ADMELOG) corrective regimen sliding scale   SubCutaneous three times a day before meals  insulin lispro (ADMELOG) corrective regimen sliding scale   SubCutaneous at bedtime  insulin lispro Injectable (ADMELOG) 3 Unit(s) SubCutaneous three times a day before meals  ketotifen 0.025% Ophthalmic Solution 1 Drop(s) Both EYES two times a day  loratadine 10 milliGRAM(s) Oral daily  nystatin Powder 1 Application(s) Topical three times a day  oxyCODONE  ER Tablet 60 milliGRAM(s) Oral every 12 hours  polyethylene glycol 3350 17 Gram(s) Oral every 12 hours    MEDICATIONS  (PRN):  acetaminophen     Tablet .. 650 milliGRAM(s) Oral every 6 hours PRN Temp greater or equal to 38C (100.4F), Mild Pain (1 - 3)  benzocaine 15 mG/menthol 3.6 mG Lozenge 1 Lozenge Oral daily PRN Sore Throat  ondansetron    Tablet 4 milliGRAM(s) Oral every 8 hours PRN Nausea and/or Vomiting  oxyCODONE    IR 10 milliGRAM(s) Oral every 4 hours PRN Severe Pain (7 - 10)    VITALS:  T(F): 97.9 (10-20-22 @ 06:05), Max: 98.2 (10-19-22 @ 20:07)  HR: 68 (10-20-22 @ 06:05) (68 - 83)  BP: 119/72 (10-20-22 @ 06:05) (119/72 - 141/62)  BP(mean): --  RR: 18 (10-20-22 @ 06:05) (18 - 18)  SpO2: 98% (10-20-22 @ 06:05) (93% - 98%)    PHYSICAL EXAM:   GENERAL: obese, lying in bed.  EYES: EOMI, conjunctiva and sclera clear,  ENT: Moist mucous membranes  CHEST/LUNG: Distant breath sounds, no gross rhonchi or wheezing.  HEART: Regular rate and rhythm; no murmurs, rubs, or gallops, normal S1/S2  ABDOMEN: obese.   EXTREMITIES: chronic venous stasis.   PSYCH: Normal mood, affect     LABS:                      12.7   4.76  )-----------( 203      ( 20 Oct 2022 05:26 )             42.1     10-20  140  |  93<L>  |  50<H>  ----------------------------<  214<H>  4.0   |  34<H>  |  1.94<H>    Ca    9.6      20 Oct 2022 05:26  Phos  4.3     10-20  Mg     2.1     10-20    Creatinine Trend: 1.94<--, 1.97<--, 1.86<--, 1.80<--, 1.69<--, 1.74<--    I&O's Summary  19 Oct 2022 07:01  -  20 Oct 2022 07:00  --------------------------------------------------------  IN: 720 mL / OUT: 5600 mL / NET: -4880 mL Internal Medicine   Alejandro Mckeon | PGY-1    OVERNIGHT EVENTS: No acute overnight events.    SUBJECTIVE: Reports 1 episode of cramping chest pain lasting <1m yesterday evening that was not concerning to her. Cough persists, shortness of breath unchanged. Denies fever.    MEDICATIONS  (STANDING):  albuterol/ipratropium for Nebulization 3 milliLiter(s) Nebulizer every 6 hours  ammonium lactate 12% Lotion 1 Application(s) Topical every 12 hours  aspirin enteric coated 81 milliGRAM(s) Oral daily  atorvastatin 80 milliGRAM(s) Oral at bedtime  budesonide 160 MICROgram(s)/formoterol 4.5 MICROgram(s) Inhaler 2 Puff(s) Inhalation two times a day  chlorhexidine 2% Cloths 1 Application(s) Topical <User Schedule>  cholecalciferol 2000 Unit(s) Oral daily  dextrose 50% Injectable 25 Gram(s) IV Push once  dextrose 50% Injectable 12.5 Gram(s) IV Push once  dextrose 50% Injectable 25 Gram(s) IV Push once  dextrose Oral Gel 15 Gram(s) Oral once  furosemide   Injectable 40 milliGRAM(s) IV Push every 12 hours  glucagon  Injectable 1 milliGRAM(s) IntraMuscular once  heparin   Injectable 5000 Unit(s) SubCutaneous every 8 hours  insulin glargine Injectable (LANTUS) 40 Unit(s) SubCutaneous at bedtime  insulin lispro (ADMELOG) corrective regimen sliding scale   SubCutaneous three times a day before meals  insulin lispro (ADMELOG) corrective regimen sliding scale   SubCutaneous at bedtime  insulin lispro Injectable (ADMELOG) 3 Unit(s) SubCutaneous three times a day before meals  ketotifen 0.025% Ophthalmic Solution 1 Drop(s) Both EYES two times a day  loratadine 10 milliGRAM(s) Oral daily  nystatin Powder 1 Application(s) Topical three times a day  oxyCODONE  ER Tablet 60 milliGRAM(s) Oral every 12 hours  polyethylene glycol 3350 17 Gram(s) Oral every 12 hours    MEDICATIONS  (PRN):  acetaminophen     Tablet .. 650 milliGRAM(s) Oral every 6 hours PRN Temp greater or equal to 38C (100.4F), Mild Pain (1 - 3)  benzocaine 15 mG/menthol 3.6 mG Lozenge 1 Lozenge Oral daily PRN Sore Throat  ondansetron    Tablet 4 milliGRAM(s) Oral every 8 hours PRN Nausea and/or Vomiting  oxyCODONE    IR 10 milliGRAM(s) Oral every 4 hours PRN Severe Pain (7 - 10)    VITALS:  T(F): 97.9 (10-20-22 @ 06:05), Max: 98.2 (10-19-22 @ 20:07)  HR: 68 (10-20-22 @ 06:05) (68 - 83)  BP: 119/72 (10-20-22 @ 06:05) (119/72 - 141/62)  BP(mean): --  RR: 18 (10-20-22 @ 06:05) (18 - 18)  SpO2: 98% (10-20-22 @ 06:05) (93% - 98%)    PHYSICAL EXAM:   GENERAL: morbidly obese, resting in bed, appears diaphoretic  EYES: EOMI, conjunctiva and sclera clear  ENT: Moist mucous membranes  CHEST/LUNG: Distant breath sounds, ?slight crackles, no rhonchi or wheezing though auscultation limited by habitus  HEART: Regular rate and rhythm; no murmurs, rubs, or gallops, normal S1/S2  ABDOMEN: firm abdominal pannus, otherwise soft, nontender, nondistended, obese  EXTREMITIES: 2+ pitting edema in bilateral LE; chronic venous stasis dermatitis w/ erythematous background  PSYCH: Appropriate mood, affect     LABS:                      12.7   4.76  )-----------( 203      ( 20 Oct 2022 05:26 )             42.1     10-20  140  |  93<L>  |  50<H>  ----------------------------<  214<H>  4.0   |  34<H>  |  1.94<H>    Ca    9.6      20 Oct 2022 05:26  Phos  4.3     10-20  Mg     2.1     10-20    Creatinine Trend: 1.94<--, 1.97<--, 1.86<--, 1.80<--, 1.69<--, 1.74<--    I&O's Summary  19 Oct 2022 07:01  -  20 Oct 2022 07:00  --------------------------------------------------------  IN: 720 mL / OUT: 5600 mL / NET: -4880 mL

## 2022-10-20 NOTE — PROGRESS NOTE ADULT - PROBLEM SELECTOR PLAN 9
Diet: DASH/CC  DVT PPx: subQ heparin  GI PPx: miralax + lactulose (per pt pref)  Dispo: Pending medical course  GOC: Patient states she has advanced directives/will at home. DNR with trial of intubation. Patient's son to bring in paperwork from home DVT PPx: subQ heparin  Diet: DASH/CC  GI PPx: miralax + lactulose (per pt pref)  Dispo: Pending medical course  GOC: Patient states she has advanced directives/will at home. DNR with trial of intubation. Patient's son to bring in paperwork from home Noted under abdominal pannus and in creases of back. Wound care consulted, appreciate recs.

## 2022-10-20 NOTE — PROGRESS NOTE ADULT - PROBLEM SELECTOR PLAN 2
Patient w/ chronic lower back and leg pain 2/2 spinal stenosis. Largely bedbound at baseline. Has oxycontin 60mg Q12H at home, per patient recently dc'ed short acting meds upon discussion w/ her internist as she did not want to feel drowsy.  - continue home oxycontin 60mg Q12H  - oxy 10mg PO Q4H PRN for breakthrough pain COVID+ on 10/20. Patient w/ stable respiratory symptoms, unclear if HF exacerbation improving and COVID worsening. Remains on home 5L NC, no new hypoxia noted.  - will start remdesivir  - CRP 29, will hold off on dex

## 2022-10-20 NOTE — PROGRESS NOTE ADULT - PROBLEM SELECTOR PLAN 5
- On Toujeo 80 units AM, 84 units PM + Novolog 20 qac at home  - c/w Lantus at reduced dose to 40 qhs for now, titrate as needed  - Hold premeal insulin for now  - Low dose correctional scale On Toujeo 80 units AM, 84 units PM + Novolog 20 qAC at home.  - c/w Lantus at reduced dose to 40 qhs for now, titrate as needed  - Hold premeal insulin for now  - Low dose correctional scale - Cr ~1.9. Baseline ~1.6  - Avoid nephrotoxins, dose per GFR

## 2022-10-20 NOTE — PROGRESS NOTE ADULT - PROBLEM SELECTOR PLAN 7
- Noted on OSH echo 11/2020  - f/u TTE  - f/u OP pulm Noted on OSH echo 11/2020. TTE repeated 10/19 w/ limited findings due to habitus.  - outpatient f/u w/ pulm - BPs well controlled  - c/w home lisinopril

## 2022-10-20 NOTE — PROGRESS NOTE ADULT - PROBLEM SELECTOR PLAN 1
Patient presenting w/ dyspnea and hypoxemia despite supplemental O2, noted to be anasarcic by family and outpatient providers. Reports increased wheezing. Hx notable for asthma, HF, limited mobility. Consider HF exacerbation vs. asthma exacerbation vs. PE vs. exacerbation of pHTN. D-dimer 339, wnl for patient's age, low suspicion for PE. Duplex w/o evidence of DVT  - last TTE 11/2020, EF 70%, suboptimal image quality  - CXR clear, proBNP elevated to 1174, however improved from January 2022 1329  - noted to have pHTN at [facility] on 11/2020  - trops peaked 10/13, will stop trending  - BL 3-4L O2 at home, does not use CPAP/BiPAP qhs, uses daily nebulizer, not always compliant with Advair pump    - trialed BIPAP and AVAPS for sleep however patient did not tolerate well, will re-evaluate need PRN  - repeat TTE today, f/u read  - lasix 40mg IV BID (increased from QD 10/17) for diuresis  - continue duonebs, hold off on steroids for now  - continue singulair (therapeutic interchange for Advair)  - Daily bed weights, strict I/O, fluid restrict Pt w/ dyspnea despite supplemental O2, on 5L NC (home 4-6L NC). Noted to be anasarcic by family prior to admission. Symptoms improved since admission but now stable. Likely 2/2 HF exacerbation given hx, now complicated by COVID+ as below. Duplex w/o evidence of DVT and d-dimer 339, low suspicion for PE.  - TTE 10/19 w/ EF 55-60%, limited findings due to habitus, unable to discern pHTN (previous noted at [facility] on 11/20)  - CXR clear, proBNP elevated to 1174, however improved from January 2022 1329  - BL 3-4L O2 at home, does not use CPAP/BiPAP qhs, uses daily nebulizer, not always compliant with Advair pump    - trialed BIPAP and AVAPS for sleep however patient did not tolerate well, will re-evaluate need PRN  - lasix 40mg IV BID for diuresis  - continue duonebs, hold off on steroids for now  - continue singulair (therapeutic interchange for Advair)  - Daily bed weights, strict I/O, fluid restrict

## 2022-10-20 NOTE — PROGRESS NOTE ADULT - PROBLEM SELECTOR PLAN 8
- Noted under abdominal pannus  - Nystatin powder  - Wound care consulted, f/u recs Noted under abdominal pannus and in creases of back. Wound care consulted, appreciate recs. Noted on OSH echo 11/2020. TTE repeated 10/19 w/ limited findings due to habitus.  - outpatient f/u w/ pulm

## 2022-10-21 LAB
ALBUMIN SERPL ELPH-MCNC: 3.9 G/DL — SIGNIFICANT CHANGE UP (ref 3.3–5)
ALP SERPL-CCNC: 153 U/L — HIGH (ref 40–120)
ALT FLD-CCNC: 31 U/L — SIGNIFICANT CHANGE UP (ref 10–45)
ANION GAP SERPL CALC-SCNC: 14 MMOL/L — SIGNIFICANT CHANGE UP (ref 5–17)
AST SERPL-CCNC: 43 U/L — HIGH (ref 10–40)
BASE EXCESS BLDV CALC-SCNC: 10.7 MMOL/L — HIGH (ref -2–3)
BILIRUB DIRECT SERPL-MCNC: 0.2 MG/DL — SIGNIFICANT CHANGE UP (ref 0–0.3)
BILIRUB INDIRECT FLD-MCNC: 0.4 MG/DL — SIGNIFICANT CHANGE UP (ref 0.2–1)
BILIRUB SERPL-MCNC: 0.6 MG/DL — SIGNIFICANT CHANGE UP (ref 0.2–1.2)
BUN SERPL-MCNC: 51 MG/DL — HIGH (ref 7–23)
CALCIUM SERPL-MCNC: 9.9 MG/DL — SIGNIFICANT CHANGE UP (ref 8.4–10.5)
CHLORIDE SERPL-SCNC: 93 MMOL/L — LOW (ref 96–108)
CO2 BLDV-SCNC: 41 MMOL/L — HIGH (ref 22–26)
CO2 SERPL-SCNC: 33 MMOL/L — HIGH (ref 22–31)
CREAT SERPL-MCNC: 1.98 MG/DL — HIGH (ref 0.5–1.3)
EGFR: 27 ML/MIN/1.73M2 — LOW
FERRITIN SERPL-MCNC: 339 NG/ML — HIGH (ref 15–150)
GAS PNL BLDV: SIGNIFICANT CHANGE UP
GLUCOSE BLDC GLUCOMTR-MCNC: 236 MG/DL — HIGH (ref 70–99)
GLUCOSE BLDC GLUCOMTR-MCNC: 276 MG/DL — HIGH (ref 70–99)
GLUCOSE BLDC GLUCOMTR-MCNC: 319 MG/DL — HIGH (ref 70–99)
GLUCOSE BLDC GLUCOMTR-MCNC: 394 MG/DL — HIGH (ref 70–99)
GLUCOSE SERPL-MCNC: 257 MG/DL — HIGH (ref 70–99)
HCO3 BLDV-SCNC: 39 MMOL/L — HIGH (ref 22–29)
HCT VFR BLD CALC: 42.5 % — SIGNIFICANT CHANGE UP (ref 34.5–45)
HGB BLD-MCNC: 12.9 G/DL — SIGNIFICANT CHANGE UP (ref 11.5–15.5)
MAGNESIUM SERPL-MCNC: 2.2 MG/DL — SIGNIFICANT CHANGE UP (ref 1.6–2.6)
MCHC RBC-ENTMCNC: 27.1 PG — SIGNIFICANT CHANGE UP (ref 27–34)
MCHC RBC-ENTMCNC: 30.4 GM/DL — LOW (ref 32–36)
MCV RBC AUTO: 89.3 FL — SIGNIFICANT CHANGE UP (ref 80–100)
NRBC # BLD: 0 /100 WBCS — SIGNIFICANT CHANGE UP (ref 0–0)
PCO2 BLDV: 71 MMHG — HIGH (ref 39–42)
PH BLDV: 7.35 — SIGNIFICANT CHANGE UP (ref 7.32–7.43)
PHOSPHATE SERPL-MCNC: 3.5 MG/DL — SIGNIFICANT CHANGE UP (ref 2.5–4.5)
PLATELET # BLD AUTO: 201 K/UL — SIGNIFICANT CHANGE UP (ref 150–400)
PO2 BLDV: 45 MMHG — SIGNIFICANT CHANGE UP (ref 25–45)
POTASSIUM SERPL-MCNC: 4.1 MMOL/L — SIGNIFICANT CHANGE UP (ref 3.5–5.3)
POTASSIUM SERPL-SCNC: 4.1 MMOL/L — SIGNIFICANT CHANGE UP (ref 3.5–5.3)
PROT SERPL-MCNC: 7.6 G/DL — SIGNIFICANT CHANGE UP (ref 6–8.3)
RBC # BLD: 4.76 M/UL — SIGNIFICANT CHANGE UP (ref 3.8–5.2)
RBC # FLD: 15 % — HIGH (ref 10.3–14.5)
SAO2 % BLDV: 71.5 % — SIGNIFICANT CHANGE UP (ref 67–88)
SODIUM SERPL-SCNC: 140 MMOL/L — SIGNIFICANT CHANGE UP (ref 135–145)
WBC # BLD: 5.01 K/UL — SIGNIFICANT CHANGE UP (ref 3.8–10.5)
WBC # FLD AUTO: 5.01 K/UL — SIGNIFICANT CHANGE UP (ref 3.8–10.5)

## 2022-10-21 PROCEDURE — 99233 SBSQ HOSP IP/OBS HIGH 50: CPT | Mod: GC

## 2022-10-21 PROCEDURE — 99221 1ST HOSP IP/OBS SF/LOW 40: CPT

## 2022-10-21 PROCEDURE — 99223 1ST HOSP IP/OBS HIGH 75: CPT | Mod: GC

## 2022-10-21 RX ORDER — INSULIN LISPRO 100/ML
4 VIAL (ML) SUBCUTANEOUS
Refills: 0 | Status: DISCONTINUED | OUTPATIENT
Start: 2022-10-21 | End: 2022-10-22

## 2022-10-21 RX ORDER — DEXAMETHASONE 0.5 MG/5ML
6 ELIXIR ORAL DAILY
Refills: 0 | Status: DISCONTINUED | OUTPATIENT
Start: 2022-10-21 | End: 2022-10-23

## 2022-10-21 RX ORDER — INSULIN GLARGINE 100 [IU]/ML
42 INJECTION, SOLUTION SUBCUTANEOUS AT BEDTIME
Refills: 0 | Status: DISCONTINUED | OUTPATIENT
Start: 2022-10-21 | End: 2022-10-22

## 2022-10-21 RX ORDER — LACTULOSE 10 G/15ML
20 SOLUTION ORAL DAILY
Refills: 0 | Status: DISCONTINUED | OUTPATIENT
Start: 2022-10-21 | End: 2022-10-26

## 2022-10-21 RX ADMIN — ATORVASTATIN CALCIUM 80 MILLIGRAM(S): 80 TABLET, FILM COATED ORAL at 22:28

## 2022-10-21 RX ADMIN — BUDESONIDE AND FORMOTEROL FUMARATE DIHYDRATE 2 PUFF(S): 160; 4.5 AEROSOL RESPIRATORY (INHALATION) at 17:11

## 2022-10-21 RX ADMIN — Medication 81 MILLIGRAM(S): at 11:18

## 2022-10-21 RX ADMIN — Medication 3 MILLILITER(S): at 11:18

## 2022-10-21 RX ADMIN — REMDESIVIR 500 MILLIGRAM(S): 5 INJECTION INTRAVENOUS at 17:12

## 2022-10-21 RX ADMIN — Medication 3 UNIT(S): at 11:24

## 2022-10-21 RX ADMIN — Medication 6 MILLIGRAM(S): at 13:51

## 2022-10-21 RX ADMIN — OXYCODONE HYDROCHLORIDE 10 MILLIGRAM(S): 5 TABLET ORAL at 20:05

## 2022-10-21 RX ADMIN — OXYCODONE HYDROCHLORIDE 60 MILLIGRAM(S): 5 TABLET ORAL at 07:51

## 2022-10-21 RX ADMIN — POLYETHYLENE GLYCOL 3350 17 GRAM(S): 17 POWDER, FOR SOLUTION ORAL at 17:26

## 2022-10-21 RX ADMIN — OXYCODONE HYDROCHLORIDE 60 MILLIGRAM(S): 5 TABLET ORAL at 17:56

## 2022-10-21 RX ADMIN — CHLORHEXIDINE GLUCONATE 1 APPLICATION(S): 213 SOLUTION TOPICAL at 06:44

## 2022-10-21 RX ADMIN — NYSTATIN CREAM 1 APPLICATION(S): 100000 CREAM TOPICAL at 22:29

## 2022-10-21 RX ADMIN — LORATADINE 10 MILLIGRAM(S): 10 TABLET ORAL at 11:18

## 2022-10-21 RX ADMIN — Medication 3 MILLILITER(S): at 06:38

## 2022-10-21 RX ADMIN — OXYCODONE HYDROCHLORIDE 10 MILLIGRAM(S): 5 TABLET ORAL at 11:25

## 2022-10-21 RX ADMIN — KETOTIFEN FUMARATE 1 DROP(S): 0.34 SOLUTION OPHTHALMIC at 17:14

## 2022-10-21 RX ADMIN — Medication 2000 UNIT(S): at 11:18

## 2022-10-21 RX ADMIN — Medication 3: at 22:28

## 2022-10-21 RX ADMIN — OXYCODONE HYDROCHLORIDE 60 MILLIGRAM(S): 5 TABLET ORAL at 17:26

## 2022-10-21 RX ADMIN — POLYETHYLENE GLYCOL 3350 17 GRAM(S): 17 POWDER, FOR SOLUTION ORAL at 06:40

## 2022-10-21 RX ADMIN — NYSTATIN CREAM 1 APPLICATION(S): 100000 CREAM TOPICAL at 13:52

## 2022-10-21 RX ADMIN — Medication 40 MILLIGRAM(S): at 17:15

## 2022-10-21 RX ADMIN — Medication 2: at 08:07

## 2022-10-21 RX ADMIN — HEPARIN SODIUM 5000 UNIT(S): 5000 INJECTION INTRAVENOUS; SUBCUTANEOUS at 22:27

## 2022-10-21 RX ADMIN — Medication 3: at 11:24

## 2022-10-21 RX ADMIN — KETOTIFEN FUMARATE 1 DROP(S): 0.34 SOLUTION OPHTHALMIC at 06:39

## 2022-10-21 RX ADMIN — HEPARIN SODIUM 5000 UNIT(S): 5000 INJECTION INTRAVENOUS; SUBCUTANEOUS at 06:38

## 2022-10-21 RX ADMIN — Medication 4: at 17:14

## 2022-10-21 RX ADMIN — Medication 3 UNIT(S): at 08:07

## 2022-10-21 RX ADMIN — OXYCODONE HYDROCHLORIDE 10 MILLIGRAM(S): 5 TABLET ORAL at 21:15

## 2022-10-21 RX ADMIN — INSULIN GLARGINE 42 UNIT(S): 100 INJECTION, SOLUTION SUBCUTANEOUS at 22:27

## 2022-10-21 RX ADMIN — BUDESONIDE AND FORMOTEROL FUMARATE DIHYDRATE 2 PUFF(S): 160; 4.5 AEROSOL RESPIRATORY (INHALATION) at 06:39

## 2022-10-21 RX ADMIN — Medication 3 MILLILITER(S): at 17:11

## 2022-10-21 RX ADMIN — OXYCODONE HYDROCHLORIDE 60 MILLIGRAM(S): 5 TABLET ORAL at 06:49

## 2022-10-21 RX ADMIN — NYSTATIN CREAM 1 APPLICATION(S): 100000 CREAM TOPICAL at 06:38

## 2022-10-21 RX ADMIN — Medication 40 MILLIGRAM(S): at 06:40

## 2022-10-21 RX ADMIN — HEPARIN SODIUM 5000 UNIT(S): 5000 INJECTION INTRAVENOUS; SUBCUTANEOUS at 13:50

## 2022-10-21 RX ADMIN — OXYCODONE HYDROCHLORIDE 10 MILLIGRAM(S): 5 TABLET ORAL at 12:25

## 2022-10-21 RX ADMIN — Medication 4 UNIT(S): at 17:14

## 2022-10-21 NOTE — PROGRESS NOTE ADULT - PROBLEM SELECTOR PLAN 10
DVT PPx: subQ heparin  Diet: DASH/CC  GI PPx: miralax + lactulose (per pt pref)  Dispo: Pending medical course  GOC: Patient states she has advanced directives/will at home. DNR with trial of intubation. Patient's son to bring in paperwork from home DVT PPx: subQ heparin  Diet: DASH/CC  GI PPx: miralax + lactulose PRN (per pt pref)  Dispo: Pending medical course  GOC: Patient states she has advanced directives/will at home. DNR with trial of intubation. Patient's son to bring in paperwork from home

## 2022-10-21 NOTE — CHART NOTE - NSCHARTNOTEFT_GEN_A_CORE
Pt requires heavy duty wheelchair which is medically necessary to allow pt to participate in ADLs at home; pt is unable to perform activities in standard wheelchair, unable to self-propel; pt has caregiver at home to assist w/ propelling wheelchair.    Pt requires carla lift which is medically necessary as pt has generalized weakness and frequent shortness of breath, requires maximum assistance of two persons for transfers, has decreased weight shifting ability, abnormal muscle tone, and decreased balance which places pt at risk for falls.

## 2022-10-21 NOTE — CONSULT NOTE ADULT - ASSESSMENT
Assessment: Patient is a 66 yo F with PMH morbid obesity, asthma, HTN, HLD, DM, CKD3 (BL Cr ~1.6), HFpEF on 3-4L NC oxygen at baseline (not on BiPAP/CPAP qhs), pHTN, endometrial cancer s/p MEGAN/SBO, DVT (resolved), spinal stenosis with chronic back pain, diabetic neuropathy, Charcot foot, chronic venous statis presenting from PCP's office due to concern for HF exacerbation. Patient at time of consultation is s/p aggressive diuresis since admission (charted at net negative ~15L per flowsheets). TTE 10/19 w/ EF 55-60%, limited findings due to habitus, unable to discern pHTN (previous noted at [facility] on 11/20)    #Acute exacerbation of heart failure with preserved ejection fraction  -TTE w/ preserved EF, unable to assess RV function/right sided pressured due to body habitus  -Though index of suspicion for pulmonary HTN/RV dysfunction is high, no indication for RHC at this time given other acute issues causing dyspnea at this time  -Recommend strongly encouraging compliance with NIV mechanical ventilation w/ BiPAP especially at night) for hypercapnic respiratory failure  -given over 15L net negative, no apparent respiratory improvement (though could be confounded by nosocomial COVID diagnosis), will ask cardiology to weigh in, specifically to ask if a RHC would be a viable option to obtain true right sided pressure measurements.  -for now continue with lasix 40mg IV BID   Assessment: Patient is a 68 yo F with PMH morbid obesity, asthma, HTN, HLD, DM, CKD3 (BL Cr ~1.6), HFpEF on 3-4L NC oxygen at baseline (not on BiPAP/CPAP qhs), pHTN, endometrial cancer s/p MEGAN/SBO, DVT (resolved), spinal stenosis with chronic back pain, diabetic neuropathy, Charcot foot, chronic venous statis presenting from PCP's office due to concern for HF exacerbation. Patient at time of consultation is s/p aggressive diuresis since admission (charted at net negative ~15L per flowsheets). TTE 10/19 w/ EF 55-60%, limited findings due to habitus, unable to discern pHTN (previous noted at [facility] on 11/20)    #Acute exacerbation of heart failure with preserved ejection fraction  -TTE w/ preserved EF, unable to assess RV function/right sided pressured due to body habitus  -Though index of suspicion for pulmonary HTN/RV dysfunction is high, no indication for RHC at this time given other acute issues causing dyspnea at this time  -Recommend SGLT2 Inhibitor which is shown to be favorable in managing diastolic heart failure  -Recommend BiPAP to assist with ventilation and alleviate elevated CO2   -Recommend Pulmonary to follow  -Would pursue management of obesity with pharmacologic/surgical intervention

## 2022-10-21 NOTE — CONSULT NOTE ADULT - SUBJECTIVE AND OBJECTIVE BOX
Patient seen and evaluated at bedside    Reason for consult:    HPI:  Patient is a 68 yo F with PMH morbid obesity, asthma, HTN, HLD, DM, CKD3 (BL Cr ~1.6), HFpEF on 3-4L NC oxygen at baseline (not on BiPAP/CPAP qhs), pHTN, endometrial cancer s/p MEGAN/SBO, DVT (resolved), spinal stenosis with chronic back pain, diabetic neuropathy, Charcot foot, chronic venous statis presenting from PCP's office due to concern for HF exacerbation. Patient reports SOB/LEGER for many years, however it has been progressively worsening for the last few months. Patient states that she gets short of breath with every movement, even when being assisted cleaning herself on her shower chair at home. She also states that most nights she wakes up in the middle of the night with severe shortness of breath and has to transition to a reclining chair to sleep. At BL requires 3 pillows to sleep given discomfort/pain from spinal stenosis, but is unable to sleep for prolonged periods regardless. Patient states she does not use her Advair daily but is using her nebulizer daily. States her legs feel more swollen than normal, and reports she feels oozing from her L shin. Reports daily CP of cramping quality x2 months, but states intensity has increased in the last few weeks. The patient also notes that she has been having daily chills for the last 2 weeks, but did not record a fever. Reports dizziness and wheezing (chronic but recent increase in frequency). She denies headache, cough, congestion, sick contacts. Has chronic nausea, but denies vomiting. Denies dysuria, abdominal pain, any changes in urination or stools.     ED course: afebrile, HR 86, BP 140s/80s, RR 20 at 96% on 6L NC. WBC 11.8, Na 140, K 5.7, Cr 1.55, BNP 1174. RVP/COV neg. CXR clear. EKG unremarkable. s/p Lasix 80 IV x1, Lokelma x1, oxy 60 x1. BCx sent. (12 Oct 2022 21:07)      PMHx:   Diabetes Mellitus Type II    HTN (Hypertension)    Endometrial Hyperplasia    Cervical Stenosis of Spine    Spinal Stenosis, Lumbar    Deep Vein Thrombosis (DVT)    Dyslipidemia    Cataract    Morbid Obesity    Vitamin D deficiency    Edema of lower extremity    Insomnia    CKD (chronic kidney disease)    Narrow angle glaucoma of left eye    Other fecal abnormalities    Congestive heart failure    Uterine cancer    Asthma    On home oxygen therapy    Gait difficulty        PSHx:   Cataract extracted with lens implant 1998    C Section 1994    Cholecystectomy/appendectomy @ age 25    D&amp;C x2 1980&#x27;s    D&amp;C 2008    Cervical Spinal Stenosis surgery x2 (01/2002, 7/2002)    Tonsillectomy as a child    Endometrial biopsy 12/02/09    H/O laser iridotomy    H/O colonoscopy    S/P total abdominal hysterectomy and bilateral salpingo-oophorectomy    S/P appendectomy    S/P cholecystectomy        Allergies:  adhesives (Rash)  Bactrim (Flushing)  latex (Rash)  wool- rash, itch (Other)      Home Meds:    Current Medications:   acetaminophen     Tablet .. 650 milliGRAM(s) Oral every 6 hours PRN  albuterol/ipratropium for Nebulization 3 milliLiter(s) Nebulizer every 6 hours  ammonium lactate 12% Lotion 1 Application(s) Topical every 12 hours  aspirin enteric coated 81 milliGRAM(s) Oral daily  atorvastatin 80 milliGRAM(s) Oral at bedtime  benzocaine 15 mG/menthol 3.6 mG Lozenge 1 Lozenge Oral daily PRN  budesonide 160 MICROgram(s)/formoterol 4.5 MICROgram(s) Inhaler 2 Puff(s) Inhalation two times a day  chlorhexidine 2% Cloths 1 Application(s) Topical <User Schedule>  cholecalciferol 2000 Unit(s) Oral daily  dexAMETHasone  Injectable 6 milliGRAM(s) IV Push daily  dextrose 50% Injectable 25 Gram(s) IV Push once  dextrose 50% Injectable 12.5 Gram(s) IV Push once  dextrose 50% Injectable 25 Gram(s) IV Push once  dextrose Oral Gel 15 Gram(s) Oral once  furosemide   Injectable 40 milliGRAM(s) IV Push every 12 hours  glucagon  Injectable 1 milliGRAM(s) IntraMuscular once  heparin   Injectable 5000 Unit(s) SubCutaneous every 8 hours  insulin glargine Injectable (LANTUS) 40 Unit(s) SubCutaneous at bedtime  insulin lispro (ADMELOG) corrective regimen sliding scale   SubCutaneous three times a day before meals  insulin lispro (ADMELOG) corrective regimen sliding scale   SubCutaneous at bedtime  insulin lispro Injectable (ADMELOG) 3 Unit(s) SubCutaneous three times a day before meals  ketotifen 0.025% Ophthalmic Solution 1 Drop(s) Both EYES two times a day  loratadine 10 milliGRAM(s) Oral daily  nystatin Powder 1 Application(s) Topical three times a day  ondansetron    Tablet 4 milliGRAM(s) Oral every 8 hours PRN  oxyCODONE    IR 10 milliGRAM(s) Oral every 4 hours PRN  oxyCODONE  ER Tablet 60 milliGRAM(s) Oral every 12 hours  polyethylene glycol 3350 17 Gram(s) Oral every 12 hours  remdesivir  IVPB 100 milliGRAM(s) IV Intermittent every 24 hours  remdesivir  IVPB   IV Intermittent       FAMILY HISTORY:  Family history of pancreatic cancer    Family history of bladder cancer    Family history of lung cancer (Grandparent)        Social History:  Smoking History:  Alcohol Use:  Drug Use:    Review of Systems:  REVIEW OF SYSTEMS:  CONSTITUTIONAL: No weakness, fevers or chills  EYES/ENT: No visual changes;  No dysphagia  NECK: No pain or stiffness  RESPIRATORY: No cough, wheezing, hemoptysis; No shortness of breath  CARDIOVASCULAR: No chest pain or palpitations; No lower extremity edema  GASTROINTESTINAL: No abdominal or epigastric pain. No nausea, vomiting, or hematemesis; No diarrhea or constipation. No melena or hematochezia.  BACK: No back pain  GENITOURINARY: No dysuria, frequency or hematuria  NEUROLOGICAL: No numbness or weakness  SKIN: No itching, burning, rashes, or lesions   All other review of systems is negative unless indicated above.    [ ] All other systems negative  [ ] Unable to assess ROS due to    Physical Exam:  T(F): 97.9 (10-21), Max: 98.4 (10-20)  HR: 76 (10-21) (71 - 79)  BP: 115/65 (10-21) (115/51 - 125/55)  RR: 18 (10-21)  SpO2: 98% (10-21)  GENERAL: No acute distress, well-developed  HEAD:  Atraumatic, Normocephalic  ENT: EOMI, PERRLA, conjunctiva and sclera clear, Neck supple, No JVD, moist mucosa  CHEST/LUNG: Clear to auscultation bilaterally; No wheeze, equal breath sounds bilaterally   BACK: No spinal tenderness  HEART: Regular rate and rhythm; No murmurs, rubs, or gallops  ABDOMEN: Soft, Nontender, Nondistended; Bowel sounds present  EXTREMITIES:  No clubbing, cyanosis, or edema  PSYCH: Nl behavior, nl affect  NEUROLOGY: AAOx3, non-focal, cranial nerves intact  SKIN: Normal color, No rashes or lesions  LINES:    Cardiovascular Diagnostic Testing:    CXR: Personally reviewed    Labs: Personally reviewed                        12.9   5.01  )-----------( 201      ( 21 Oct 2022 07:38 )             42.5     10-21    140  |  93<L>  |  51<H>  ----------------------------<  257<H>  4.1   |  33<H>  |  1.98<H>    Ca    9.9      21 Oct 2022 07:38  Phos  3.5     10-21  Mg     2.2     10-21    TPro  7.6  /  Alb  3.9  /  TBili  0.6  /  DBili  0.2  /  AST  43<H>  /  ALT  31  /  AlkPhos  153<H>  10-21             Patient seen and evaluated at bedside    Reason for consult: HF exacerbation, increasing SOB    HPI:  Patient is a 68 yo F with PMH morbid obesity, asthma, HTN, HLD, DM, CKD3 (BL Cr ~1.6), HFpEF on 3-4L NC oxygen at baseline (not on BiPAP/CPAP qhs), pHTN, endometrial cancer s/p MEGAN/SBO, DVT (resolved), spinal stenosis with chronic back pain, diabetic neuropathy, Charcot foot, chronic venous statis presenting from PCP's office due to concern for HF exacerbation. Patient reports SOB/LEGER for many years, however it has been progressively worsening for the last few months. Patient states that she gets short of breath with every movement, even when being assisted cleaning herself on her shower chair at home. She also states that most nights she wakes up in the middle of the night with severe shortness of breath and has to transition to a reclining chair to sleep. At BL requires 3 pillows to sleep given discomfort/pain from spinal stenosis, but is unable to sleep for prolonged periods regardless. Patient states she does not use her Advair daily but is using her nebulizer daily. States her legs feel more swollen than normal, and reports she feels oozing from her L shin. Reports daily CP of cramping quality x2 months, but states intensity has increased in the last few weeks. The patient also notes that she has been having daily chills for the last 2 weeks, but did not record a fever. Reports dizziness and wheezing (chronic but recent increase in frequency). She denies headache, cough, congestion, sick contacts. Has chronic nausea, but denies vomiting. Denies dysuria, abdominal pain, any changes in urination or stools.     ED course: afebrile, HR 86, BP 140s/80s, RR 20 at 96% on 6L NC. WBC 11.8, Na 140, K 5.7, Cr 1.55, BNP 1174. RVP/COV neg. CXR clear. EKG unremarkable. s/p Lasix 80 IV x1, Lokelma x1, oxy 60 x1. BCx sent. (12 Oct 2022 21:07)      PMHx:   Diabetes Mellitus Type II    HTN (Hypertension)    Endometrial Hyperplasia    Cervical Stenosis of Spine    Spinal Stenosis, Lumbar    Deep Vein Thrombosis (DVT)    Dyslipidemia    Cataract    Morbid Obesity    Vitamin D deficiency    Edema of lower extremity    Insomnia    CKD (chronic kidney disease)    Narrow angle glaucoma of left eye    Other fecal abnormalities    Congestive heart failure    Uterine cancer    Asthma    On home oxygen therapy    Gait difficulty        PSHx:   Cataract extracted with lens implant 1998    C Section 1994    Cholecystectomy/appendectomy @ age 25    D&amp;C x2 1980&#x27;s    D&amp;C 2008    Cervical Spinal Stenosis surgery x2 (01/2002, 7/2002)    Tonsillectomy as a child    Endometrial biopsy 12/02/09    H/O laser iridotomy    H/O colonoscopy    S/P total abdominal hysterectomy and bilateral salpingo-oophorectomy    S/P appendectomy    S/P cholecystectomy        Allergies:  adhesives (Rash)  Bactrim (Flushing)  latex (Rash)  wool- rash, itch (Other)      Home Meds:    Current Medications:   acetaminophen     Tablet .. 650 milliGRAM(s) Oral every 6 hours PRN  albuterol/ipratropium for Nebulization 3 milliLiter(s) Nebulizer every 6 hours  ammonium lactate 12% Lotion 1 Application(s) Topical every 12 hours  aspirin enteric coated 81 milliGRAM(s) Oral daily  atorvastatin 80 milliGRAM(s) Oral at bedtime  benzocaine 15 mG/menthol 3.6 mG Lozenge 1 Lozenge Oral daily PRN  budesonide 160 MICROgram(s)/formoterol 4.5 MICROgram(s) Inhaler 2 Puff(s) Inhalation two times a day  chlorhexidine 2% Cloths 1 Application(s) Topical <User Schedule>  cholecalciferol 2000 Unit(s) Oral daily  dexAMETHasone  Injectable 6 milliGRAM(s) IV Push daily  dextrose 50% Injectable 25 Gram(s) IV Push once  dextrose 50% Injectable 12.5 Gram(s) IV Push once  dextrose 50% Injectable 25 Gram(s) IV Push once  dextrose Oral Gel 15 Gram(s) Oral once  furosemide   Injectable 40 milliGRAM(s) IV Push every 12 hours  glucagon  Injectable 1 milliGRAM(s) IntraMuscular once  heparin   Injectable 5000 Unit(s) SubCutaneous every 8 hours  insulin glargine Injectable (LANTUS) 40 Unit(s) SubCutaneous at bedtime  insulin lispro (ADMELOG) corrective regimen sliding scale   SubCutaneous three times a day before meals  insulin lispro (ADMELOG) corrective regimen sliding scale   SubCutaneous at bedtime  insulin lispro Injectable (ADMELOG) 3 Unit(s) SubCutaneous three times a day before meals  ketotifen 0.025% Ophthalmic Solution 1 Drop(s) Both EYES two times a day  loratadine 10 milliGRAM(s) Oral daily  nystatin Powder 1 Application(s) Topical three times a day  ondansetron    Tablet 4 milliGRAM(s) Oral every 8 hours PRN  oxyCODONE    IR 10 milliGRAM(s) Oral every 4 hours PRN  oxyCODONE  ER Tablet 60 milliGRAM(s) Oral every 12 hours  polyethylene glycol 3350 17 Gram(s) Oral every 12 hours  remdesivir  IVPB 100 milliGRAM(s) IV Intermittent every 24 hours  remdesivir  IVPB   IV Intermittent       FAMILY HISTORY:  Family history of pancreatic cancer    Family history of bladder cancer    Family history of lung cancer (Grandparent)        Social History:  Smoking History:  Alcohol Use:  Drug Use:    Review of Systems:  REVIEW OF SYSTEMS:  CONSTITUTIONAL: No weakness, fevers or chills  RESPIRATORY: No cough, wheezing, hemoptysis; Endorses shortness of breath  CARDIOVASCULAR: Endorses intermittent chest pain or palpitations; No lower extremity edema    [ ] All other systems negative  [ ] Unable to assess ROS due to    Physical Exam:  T(F): 97.9 (10-21), Max: 98.4 (10-20)  HR: 76 (10-21) (71 - 79)  BP: 115/65 (10-21) (115/51 - 125/55)  RR: 18 (10-21)  SpO2: 98% (10-21)  GENERAL: No acute distress, well-developed  HEAD:  Atraumatic, Normocephalic  ENT: EOMI, PERRLA, conjunctiva and sclera clear, Neck supple, No JVD, moist mucosa  CHEST/LUNG: Clear to auscultation bilaterally; No wheeze, equal breath sounds bilaterally   BACK: No spinal tenderness  HEART: Regular rate and rhythm; No murmurs, rubs, or gallops  ABDOMEN: Soft, Nontender, Nondistended; Bowel sounds present  EXTREMITIES:  No clubbing, cyanosis, or edema  PSYCH: Nl behavior, nl affect  NEUROLOGY: AAOx3, non-focal, cranial nerves intact  SKIN: Normal color, No rashes or lesions  LINES:    Cardiovascular Diagnostic Testing:    CXR: Personally reviewed    Labs: Personally reviewed                        12.9   5.01  )-----------( 201      ( 21 Oct 2022 07:38 )             42.5     10-21    140  |  93<L>  |  51<H>  ----------------------------<  257<H>  4.1   |  33<H>  |  1.98<H>    Ca    9.9      21 Oct 2022 07:38  Phos  3.5     10-21  Mg     2.2     10-21    TPro  7.6  /  Alb  3.9  /  TBili  0.6  /  DBili  0.2  /  AST  43<H>  /  ALT  31  /  AlkPhos  153<H>  10-21

## 2022-10-21 NOTE — PROGRESS NOTE ADULT - PROBLEM SELECTOR PLAN 1
Pt w/ dyspnea despite supplemental O2, on 5L NC (home 4-6L NC). Noted to be anasarcic by family prior to admission. Symptoms improved since admission but now stable. Likely 2/2 HF exacerbation given hx, now complicated by COVID+ as below. Duplex w/o evidence of DVT and d-dimer 339, low suspicion for PE.  - TTE 10/19 w/ EF 55-60%, limited findings due to habitus, unable to discern pHTN (previous noted at [facility] on 11/20)  - CXR clear, proBNP elevated to 1174, however improved from January 2022 1329  - BL 3-4L O2 at home, does not use CPAP/BiPAP qhs, uses daily nebulizer, not always compliant with Advair pump    - trialed BIPAP and AVAPS for sleep however patient did not tolerate well, will re-evaluate need PRN  - lasix 40mg IV BID for diuresis  - continue duonebs, hold off on steroids for now  - continue singulair (therapeutic interchange for Advair)  - Daily bed weights, strict I/O, fluid restrict Pt w/ dyspnea despite supplemental O2, on 5L NC (home 3-4L NC, uses daily nebulizer). Noted to be anasarcic by family prior to admission. Symptoms improved since admission but now stable. Likely 2/2 HF exacerbation given hx, now complicated by COVID+ as below. Duplex w/o evidence of DVT and d-dimer 339, low suspicion for PE.  - TTE 10/19 w/ EF 55-60%, limited findings due to habitus, unable to discern pHTN (previous noted at [facility] on 11/20)     - cardiology consult for further recs as pt s/p 15L+ diuresis w/o sig improvement of sx, unclear status of pHTN     - pCO2 71 this AM, patient would benefit from BIPAP though has previously refuse, will recommend reinitiation  - continue lasix 40mg IV BID for diuresis  - continue duonebs, hold off on steroids for now  - continue singulair (therapeutic interchange for Advair)  - Daily bed weights, strict I/O, fluid restrict

## 2022-10-21 NOTE — PROGRESS NOTE ADULT - PROBLEM SELECTOR PLAN 6
On Toujeo 80 units AM, 84 units PM + Novolog 20 qAC at home.  - c/w Lantus at reduced dose to 40 qhs for now, titrate as needed  - Hold premeal insulin for now  - Low dose correctional scale

## 2022-10-21 NOTE — PROGRESS NOTE ADULT - PROBLEM SELECTOR PLAN 8
Noted on OSH echo 11/2020. TTE repeated 10/19 w/ limited findings due to habitus.  - outpatient f/u w/ pulm

## 2022-10-21 NOTE — PROGRESS NOTE ADULT - SUBJECTIVE AND OBJECTIVE BOX
Internal Medicine   Alejandro Mckeon | PGY-1    OVERNIGHT EVENTS: No acute overnight events.    SUBJECTIVE: Reports symptoms stable, still short of breath at baseline w/ episodes of exacerbation triggered by exertion.    MEDICATIONS  (STANDING):  albuterol/ipratropium for Nebulization 3 milliLiter(s) Nebulizer every 6 hours  ammonium lactate 12% Lotion 1 Application(s) Topical every 12 hours  aspirin enteric coated 81 milliGRAM(s) Oral daily  atorvastatin 80 milliGRAM(s) Oral at bedtime  budesonide 160 MICROgram(s)/formoterol 4.5 MICROgram(s) Inhaler 2 Puff(s) Inhalation two times a day  chlorhexidine 2% Cloths 1 Application(s) Topical <User Schedule>  cholecalciferol 2000 Unit(s) Oral daily  dexAMETHasone  Injectable 6 milliGRAM(s) IV Push daily  dextrose 50% Injectable 25 Gram(s) IV Push once  dextrose 50% Injectable 12.5 Gram(s) IV Push once  dextrose 50% Injectable 25 Gram(s) IV Push once  dextrose Oral Gel 15 Gram(s) Oral once  furosemide   Injectable 40 milliGRAM(s) IV Push every 12 hours  glucagon  Injectable 1 milliGRAM(s) IntraMuscular once  heparin   Injectable 5000 Unit(s) SubCutaneous every 8 hours  insulin glargine Injectable (LANTUS) 42 Unit(s) SubCutaneous at bedtime  insulin lispro (ADMELOG) corrective regimen sliding scale   SubCutaneous three times a day before meals  insulin lispro (ADMELOG) corrective regimen sliding scale   SubCutaneous at bedtime  insulin lispro Injectable (ADMELOG) 4 Unit(s) SubCutaneous three times a day before meals  ketotifen 0.025% Ophthalmic Solution 1 Drop(s) Both EYES two times a day  loratadine 10 milliGRAM(s) Oral daily  nystatin Powder 1 Application(s) Topical three times a day  oxyCODONE  ER Tablet 60 milliGRAM(s) Oral every 12 hours  polyethylene glycol 3350 17 Gram(s) Oral every 12 hours  remdesivir  IVPB 100 milliGRAM(s) IV Intermittent every 24 hours  remdesivir  IVPB   IV Intermittent     MEDICATIONS  (PRN):  acetaminophen     Tablet .. 650 milliGRAM(s) Oral every 6 hours PRN Temp greater or equal to 38C (100.4F), Mild Pain (1 - 3)  benzocaine 15 mG/menthol 3.6 mG Lozenge 1 Lozenge Oral daily PRN Sore Throat  ondansetron    Tablet 4 milliGRAM(s) Oral every 8 hours PRN Nausea and/or Vomiting  oxyCODONE    IR 10 milliGRAM(s) Oral every 4 hours PRN Severe Pain (7 - 10)    VITALS:  T(F): 97.9 (10-21-22 @ 12:10), Max: 98.4 (10-20-22 @ 17:21)  HR: 76 (10-21-22 @ 12:10) (71 - 79)  BP: 115/65 (10-21-22 @ 12:10) (115/51 - 125/55)  BP(mean): --  RR: 18 (10-21-22 @ 12:10) (18 - 19)  SpO2: 98% (10-21-22 @ 12:10) (94% - 98%)    PHYSICAL EXAM:   GENERAL: NAD, resting in bed, appears winded/diaphoretic, frequent cough  HEAD: Atraumatic, normocephalic  EYES: EOMI, conjunctiva and sclera clear, no nystagmus noted  ENT: Moist mucous membranes  CHEST/LUNG: Prominent expiratory wheezes bilaterally, clearly noted in upper fields though auscultation in other areas limited by habitus; no rales, rhonchi, or rubs; increased WOB on 5L NC  HEART: Regular rate and rhythm; no murmurs, rubs, or gallops, normal S1/S2  ABDOMEN: Firm pannus, otherwise soft, nontender, nondistended  EXTREMITIES: 2+ pitting edema in bilateral LE, no clubbing or cyanosis, stasis dermatitis appreciated in bilat ankles  MSK: No gross deformities noted   NEURO: Grossly nonfocal  SKIN: No rashes or lesions except as noted above  PSYCH: Normal mood, affect     LABS:                      12.9   5.01  )-----------( 201      ( 21 Oct 2022 07:38 )             42.5     10-21  140  |  93<L>  |  51<H>  ----------------------------<  257<H>  4.1   |  33<H>  |  1.98<H>    Ca    9.9      21 Oct 2022 07:38  Phos  3.5     10-21  Mg     2.2     10-21    TPro  7.6  /  Alb  3.9  /  TBili  0.6  /  DBili  0.2  /  AST  43<H>  /  ALT  31  /  AlkPhos  153<H>  10-21    Creatinine Trend: 1.98<--, 1.94<--, 1.97<--, 1.86<--, 1.80<--, 1.69<--    I&O's Summary  20 Oct 2022 07:01  -  21 Oct 2022 07:00  --------------------------------------------------------  IN: 730 mL / OUT: 1900 mL / NET: -1170 mL    21 Oct 2022 07:01  -  21 Oct 2022 17:09  --------------------------------------------------------  IN: 630 mL / OUT: 1000 mL / NET: -370 mL

## 2022-10-21 NOTE — CONSULT NOTE ADULT - ATTENDING COMMENTS
67 year old woman with morbid obesity complains of increased dyspnea compared to past though when dyspneic does not desaturate. Ventricular systolic function appears preserved, volume status seems appropriate. She could benefit from SGLT-2 inhibitor which has data demonstrating improved outcomes with heart failure preserved ejection fraction. Otherwise need to maintain on diuretic seeking optimal volume status, likely to poorly tolerate excessive or under-diuresis.    To contact call Cardiology Fellow or Attending as listed on [email or web address] password: [email or web address].

## 2022-10-21 NOTE — PROGRESS NOTE ADULT - PROBLEM SELECTOR PLAN 2
COVID+ on 10/20. Patient w/ stable respiratory symptoms, unclear if HF exacerbation improving and COVID worsening. Remains on home 5L NC, no new hypoxia noted.  - will start remdesivir  - CRP 29, will hold off on dex COVID+ on 10/20. Patient w/ stable O2 needs however now with worsened wheezes, unclear if HF exacerbation improving and COVID worsening.  - will continue remdesivir, start dex given wheeze

## 2022-10-21 NOTE — PROGRESS NOTE ADULT - ASSESSMENT
A/P: 66F with PMH morbid obesity, moderate persistant asthma, HTN, HLD, DM, CKD3, HFpEF on 4-6L NC oxygen at baseline (not on BiPAP/CPAP qhs), presenting with SOB/LEGER c/f HF exacerbation vs. asthma exacerbation vs. PE vs. exacerbation of pHTN    Wound Consult requested to assist w/ management of: BLE PVD/ PAD  Incontinence of urine and stool  MAD of Under-Breast/ IMF  Candidiasis Moisture Dermatitis of Groin/ Panus skin folds    GROIN/ SKIN FOlds/ PANUS/ Breast fplds- After cleansing - Tuck INTERDry Ag QD and prn soiling  Buttocks/ Sacrum  TRIAD BID and prn soiling        Continue w/ attends under pads and Pericare w/ purewick care as per protocol  BLE feet wounds- dpm   BLE elevation & Compression  Abx per Medicine  Moisturize intact skin w/ SWEEN cream BID  Nutrition Consult for optimization            encourage high quality protein, MVI & Vit C to promote wound healing  Hyperglycemia - ADA diet and Lantus & FS w/ ISS  Continue turning and positioning w/ offloading assistive devices as per protocol  Waffle Cushion to chair when oob to chair  Continue w/ low air loss pressure redistribution bed surface   Care as per medicine, remain available as requested  Upon discharge f/u as outpatient [facility] [address] [facility] [phone]   D/w team & RN  MAMIE CaoC CWS [id number]  I spent 15minutes face to face w/ this pt of which more than 50% of the time was spent counseling & coordinating care of this pt.

## 2022-10-21 NOTE — PROGRESS NOTE ADULT - SUBJECTIVE AND OBJECTIVE BOX
[facility]-- WOUND TEAM -- FOLLOW UP NOTE  --------------------------------------------------------------------------------    24 hour events/subjective:          Diet:  Diet, Consistent Carbohydrate w/Evening Snack:   DASH/TLC Sodium & Cholesterol Restricted (DASH)  1000mL Fluid Restriction ([id number]) (10-12-22 @ 21:17)      ROS: General/ SKIN/ MSK/ Neuro/ GI see HPI  all other systems negative  pt unable to offer    ALLERGIES & MEDICATIONS  --------------------------------------------------------------------------------  Allergies    adhesives (Rash)  Bactrim (Flushing)  latex (Rash)  wool- rash, itch (Other)    Intolerances          STANDING INPATIENT MEDICATIONS    albuterol/ipratropium for Nebulization 3 milliLiter(s) Nebulizer every 6 hours  ammonium lactate 12% Lotion 1 Application(s) Topical every 12 hours  aspirin enteric coated 81 milliGRAM(s) Oral daily  atorvastatin 80 milliGRAM(s) Oral at bedtime  budesonide 160 MICROgram(s)/formoterol 4.5 MICROgram(s) Inhaler 2 Puff(s) Inhalation two times a day  chlorhexidine 2% Cloths 1 Application(s) Topical <User Schedule>  cholecalciferol 2000 Unit(s) Oral daily  dexAMETHasone  Injectable 6 milliGRAM(s) IV Push daily  dextrose 50% Injectable 25 Gram(s) IV Push once  dextrose 50% Injectable 12.5 Gram(s) IV Push once  dextrose 50% Injectable 25 Gram(s) IV Push once  dextrose Oral Gel 15 Gram(s) Oral once  furosemide   Injectable 40 milliGRAM(s) IV Push every 12 hours  glucagon  Injectable 1 milliGRAM(s) IntraMuscular once  heparin   Injectable 5000 Unit(s) SubCutaneous every 8 hours  insulin glargine Injectable (LANTUS) 42 Unit(s) SubCutaneous at bedtime  insulin lispro (ADMELOG) corrective regimen sliding scale   SubCutaneous three times a day before meals  insulin lispro (ADMELOG) corrective regimen sliding scale   SubCutaneous at bedtime  insulin lispro Injectable (ADMELOG) 4 Unit(s) SubCutaneous three times a day before meals  ketotifen 0.025% Ophthalmic Solution 1 Drop(s) Both EYES two times a day  loratadine 10 milliGRAM(s) Oral daily  nystatin Powder 1 Application(s) Topical three times a day  oxyCODONE  ER Tablet 60 milliGRAM(s) Oral every 12 hours  polyethylene glycol 3350 17 Gram(s) Oral every 12 hours  remdesivir  IVPB 100 milliGRAM(s) IV Intermittent every 24 hours  remdesivir  IVPB   IV Intermittent       PRN INPATIENT MEDICATION  acetaminophen     Tablet .. 650 milliGRAM(s) Oral every 6 hours PRN  benzocaine 15 mG/menthol 3.6 mG Lozenge 1 Lozenge Oral daily PRN  lactulose Syrup 20 Gram(s) Oral daily PRN  ondansetron    Tablet 4 milliGRAM(s) Oral every 8 hours PRN  oxyCODONE    IR 10 milliGRAM(s) Oral every 4 hours PRN        VITALS/PHYSICAL EXAM  --------------------------------------------------------------------------------  T(C): 36.6 (10-21-22 @ 12:10), Max: 36.7 (10-20-22 @ 20:43)  HR: 76 (10-21-22 @ 12:10) (71 - 78)  BP: 115/65 (10-21-22 @ 12:10) (115/65 - 125/55)  RR: 18 (10-21-22 @ 12:10) (18 - 19)  SpO2: 98% (10-21-22 @ 12:10) (94% - 98%)  Wt(kg): --        10-20-22 @ 07:01  -  10-21-22 @ 07:00  --------------------------------------------------------  IN: 730 mL / OUT: 1900 mL / NET: -1170 mL    10-21-22 @ 07:01  -  10-21-22 @ 18:07  --------------------------------------------------------  IN: 630 mL / OUT: 1000 mL / NET: -370 mL            LABS/ CULTURES/ RADIOLOGY:              12.9   5.01  >-----------<  201      [10-21-22 @ 07:38]              42.5     140  |  93  |  51  ----------------------------<  257      [10-21-22 @ 07:38]  4.1   |  33  |  1.98        Ca     9.9     [10-21-22 @ 07:38]      Mg     2.2     [10-21-22 @ 07:38]      Phos  3.5     [10-21-22 @ 07:38]    TPro  7.6  /  Alb  3.9  /  TBili  0.6  /  DBili  0.2  /  AST  43  /  ALT  31  /  AlkPhos  153  [10-21-22 @ 07:38]              [10-20-22 @ 12:16]    Blood Gas Calcium, Ionized - Venous: 1.17 mmol/L (10-20-22 @ 12:14)      CAPILLARY BLOOD GLUCOSE      POCT Blood Glucose.: 319 mg/dL (21 Oct 2022 16:50)  POCT Blood Glucose.: 276 mg/dL (21 Oct 2022 11:17)  POCT Blood Glucose.: 236 mg/dL (21 Oct 2022 08:04)  POCT Blood Glucose.: 307 mg/dL (20 Oct 2022 23:38)  POCT Blood Glucose.: 329 mg/dL (20 Oct 2022 21:06)                      A1C with Estimated Average Glucose Result: 7.3 % (10-13-22 @ 11:47)   [facility]-- WOUND TEAM -- FOLLOW UP NOTE  --------------------------------------------------------------------------------    24 hour events/subjective:    afebrile  turning needs some assist  tolerating po  somewhat continent  likes the cloths for moisture in skin folds     sometimes she says she gets it under breasts=      will try to  use under breasts too      Diet:  Diet, Consistent Carbohydrate w/Evening Snack:   DASH/TLC Sodium & Cholesterol Restricted (DASH)  1000mL Fluid Restriction ([id number]) (10-12-22 @ 21:17)      ROS: General/ SKIN/ MSK/GI see HPI  all other systems negative    ALLERGIES & MEDICATIONS  --------------------------------------------------------------------------------  Allergies  adhesives (Rash)  Bactrim (Flushing)  latex (Rash)  wool- rash, itch (Other)    STANDING INPATIENT MEDICATIONS  albuterol/ipratropium for Nebulization 3 milliLiter(s) Nebulizer every 6 hours  ammonium lactate 12% Lotion 1 Application(s) Topical every 12 hours  aspirin enteric coated 81 milliGRAM(s) Oral daily  atorvastatin 80 milliGRAM(s) Oral at bedtime  budesonide 160 MICROgram(s)/formoterol 4.5 MICROgram(s) Inhaler 2 Puff(s) Inhalation two times a day  chlorhexidine 2% Cloths 1 Application(s) Topical <User Schedule>  cholecalciferol 2000 Unit(s) Oral daily  dexAMETHasone  Injectable 6 milliGRAM(s) IV Push daily  dextrose 50% Injectable 25 Gram(s) IV Push once  dextrose 50% Injectable 12.5 Gram(s) IV Push once  dextrose 50% Injectable 25 Gram(s) IV Push once  dextrose Oral Gel 15 Gram(s) Oral once  furosemide   Injectable 40 milliGRAM(s) IV Push every 12 hours  glucagon  Injectable 1 milliGRAM(s) IntraMuscular once  heparin   Injectable 5000 Unit(s) SubCutaneous every 8 hours  insulin glargine Injectable (LANTUS) 42 Unit(s) SubCutaneous at bedtime  insulin lispro (ADMELOG) corrective regimen sliding scale   SubCutaneous three times a day before meals  insulin lispro (ADMELOG) corrective regimen sliding scale   SubCutaneous at bedtime  insulin lispro Injectable (ADMELOG) 4 Unit(s) SubCutaneous three times a day before meals  ketotifen 0.025% Ophthalmic Solution 1 Drop(s) Both EYES two times a day  loratadine 10 milliGRAM(s) Oral daily  nystatin Powder 1 Application(s) Topical three times a day  oxyCODONE  ER Tablet 60 milliGRAM(s) Oral every 12 hours  polyethylene glycol 3350 17 Gram(s) Oral every 12 hours  remdesivir  IVPB 100 milliGRAM(s) IV Intermittent every 24 hours  remdesivir  IVPB   IV Intermittent       PRN INPATIENT MEDICATION  acetaminophen     Tablet .. 650 milliGRAM(s) Oral every 6 hours PRN  benzocaine 15 mG/menthol 3.6 mG Lozenge 1 Lozenge Oral daily PRN  lactulose Syrup 20 Gram(s) Oral daily PRN  ondansetron    Tablet 4 milliGRAM(s) Oral every 8 hours PRN  oxyCODONE    IR 10 milliGRAM(s) Oral every 4 hours PRN        VITALS/PHYSICAL EXAM  --------------------------------------------------------------------------------  T(C): 36.6 (10-21-22 @ 12:10), Max: 36.7 (10-20-22 @ 20:43)  HR: 76 (10-21-22 @ 12:10) (71 - 78)  BP: 115/65 (10-21-22 @ 12:10) (115/65 - 125/55)  RR: 18 (10-21-22 @ 12:10) (18 - 19)  SpO2: 98% (10-21-22 @ 12:10) (94% - 98%)  Wt(kg): --        10-20-22 @ 07:01  -  10-21-22 @ 07:00  --------------------------------------------------------  IN: 730 mL / OUT: 1900 mL / NET: -1170 mL    10-21-22 @ 07:01  -  10-21-22 @ 18:07  --------------------------------------------------------  IN: 630 mL / OUT: 1000 mL / NET: -370 mL      NAD  A&Ox3  MO  Bariatric Total Care Sport  HEENT:  NC/AT, EOMI, sclera clear, mucosa moist, throat clear, trachea midline, neck supple  Neurology: strength & sensation grossly intact  Psych: calm/ appropriate  Musculoskeletal:   FROM, no deformities/ contractures  Vascular: BLE equally warm,  no cyanosis, clubbing, edema        BLE edema equal, no acute ischemia noted        BLE hemosiderin staining        Bilateral feet as per dpm  Skin:  dry w/ good turgor  Breast IMF w/ faint erythema blanchable w/o blistering or drainage  Abdominal and Groin Pannus skin folds w/ moist skin w/faint erythema suggestive of fungal rash     no open skin or blistering or discrete area of drainage  No odor, erythema, increased warmth, tenderness, induration, fluctuance, nor crepitus      LABS/ CULTURES/ RADIOLOGY:              12.9   5.01  >-----------<  201      [10-21-22 @ 07:38]              42.5     140  |  93  |  51  ----------------------------<  257      [10-21-22 @ 07:38]  4.1   |  33  |  1.98        Ca     9.9     [10-21-22 @ 07:38]      Mg     2.2     [10-21-22 @ 07:38]      Phos  3.5     [10-21-22 @ 07:38]    TPro  7.6  /  Alb  3.9  /  TBili  0.6  /  DBili  0.2  /  AST  43  /  ALT  31  /  AlkPhos  153  [10-21-22 @ 07:38]        CAPILLARY BLOOD GLUCOSE  POCT Blood Glucose.: 319 mg/dL (21 Oct 2022 16:50)  POCT Blood Glucose.: 276 mg/dL (21 Oct 2022 11:17)  POCT Blood Glucose.: 236 mg/dL (21 Oct 2022 08:04)  POCT Blood Glucose.: 307 mg/dL (20 Oct 2022 23:38)  POCT Blood Glucose.: 329 mg/dL (20 Oct 2022 21:06)    A1C with Estimated Average Glucose Result: 7.3 % (10-13-22 @ 11:47)

## 2022-10-22 ENCOUNTER — TRANSCRIPTION ENCOUNTER (OUTPATIENT)
Age: 67
End: 2022-10-22

## 2022-10-22 LAB
ALBUMIN SERPL ELPH-MCNC: 3.7 G/DL — SIGNIFICANT CHANGE UP (ref 3.3–5)
ALP SERPL-CCNC: 150 U/L — HIGH (ref 40–120)
ALT FLD-CCNC: 34 U/L — SIGNIFICANT CHANGE UP (ref 10–45)
ANION GAP SERPL CALC-SCNC: 14 MMOL/L — SIGNIFICANT CHANGE UP (ref 5–17)
ANION GAP SERPL CALC-SCNC: 16 MMOL/L — SIGNIFICANT CHANGE UP (ref 5–17)
AST SERPL-CCNC: 39 U/L — SIGNIFICANT CHANGE UP (ref 10–40)
B-OH-BUTYR SERPL-SCNC: 0.1 MMOL/L — SIGNIFICANT CHANGE UP
BASE EXCESS BLDA CALC-SCNC: 12.7 MMOL/L — HIGH (ref -2–3)
BILIRUB SERPL-MCNC: 0.7 MG/DL — SIGNIFICANT CHANGE UP (ref 0.2–1.2)
BUN SERPL-MCNC: 52 MG/DL — HIGH (ref 7–23)
BUN SERPL-MCNC: 58 MG/DL — HIGH (ref 7–23)
CALCIUM SERPL-MCNC: 9.6 MG/DL — SIGNIFICANT CHANGE UP (ref 8.4–10.5)
CALCIUM SERPL-MCNC: 9.8 MG/DL — SIGNIFICANT CHANGE UP (ref 8.4–10.5)
CHLORIDE SERPL-SCNC: 88 MMOL/L — LOW (ref 96–108)
CHLORIDE SERPL-SCNC: 90 MMOL/L — LOW (ref 96–108)
CO2 BLDA-SCNC: 41 MMOL/L — HIGH (ref 19–24)
CO2 SERPL-SCNC: 27 MMOL/L — SIGNIFICANT CHANGE UP (ref 22–31)
CO2 SERPL-SCNC: 31 MMOL/L — SIGNIFICANT CHANGE UP (ref 22–31)
CREAT SERPL-MCNC: 1.6 MG/DL — HIGH (ref 0.5–1.3)
CREAT SERPL-MCNC: 1.83 MG/DL — HIGH (ref 0.5–1.3)
EGFR: 30 ML/MIN/1.73M2 — LOW
EGFR: 35 ML/MIN/1.73M2 — LOW
GAS PNL BLDA: SIGNIFICANT CHANGE UP
GLUCOSE BLDC GLUCOMTR-MCNC: 375 MG/DL — HIGH (ref 70–99)
GLUCOSE BLDC GLUCOMTR-MCNC: 432 MG/DL — HIGH (ref 70–99)
GLUCOSE BLDC GLUCOMTR-MCNC: 465 MG/DL — CRITICAL HIGH (ref 70–99)
GLUCOSE BLDC GLUCOMTR-MCNC: 468 MG/DL — CRITICAL HIGH (ref 70–99)
GLUCOSE BLDC GLUCOMTR-MCNC: 475 MG/DL — CRITICAL HIGH (ref 70–99)
GLUCOSE BLDC GLUCOMTR-MCNC: 482 MG/DL — CRITICAL HIGH (ref 70–99)
GLUCOSE BLDC GLUCOMTR-MCNC: 485 MG/DL — CRITICAL HIGH (ref 70–99)
GLUCOSE BLDC GLUCOMTR-MCNC: 500 MG/DL — CRITICAL HIGH (ref 70–99)
GLUCOSE BLDC GLUCOMTR-MCNC: 509 MG/DL — CRITICAL HIGH (ref 70–99)
GLUCOSE SERPL-MCNC: 524 MG/DL — CRITICAL HIGH (ref 70–99)
GLUCOSE SERPL-MCNC: 535 MG/DL — CRITICAL HIGH (ref 70–99)
HCO3 BLDA-SCNC: 39 MMOL/L — HIGH (ref 21–28)
HCT VFR BLD CALC: 44.4 % — SIGNIFICANT CHANGE UP (ref 34.5–45)
HGB BLD-MCNC: 13.5 G/DL — SIGNIFICANT CHANGE UP (ref 11.5–15.5)
MAGNESIUM SERPL-MCNC: 2 MG/DL — SIGNIFICANT CHANGE UP (ref 1.6–2.6)
MCHC RBC-ENTMCNC: 27.1 PG — SIGNIFICANT CHANGE UP (ref 27–34)
MCHC RBC-ENTMCNC: 30.4 GM/DL — LOW (ref 32–36)
MCV RBC AUTO: 89.2 FL — SIGNIFICANT CHANGE UP (ref 80–100)
NRBC # BLD: 0 /100 WBCS — SIGNIFICANT CHANGE UP (ref 0–0)
PCO2 BLDA: 58 MMHG — HIGH (ref 32–45)
PH BLDA: 7.44 — SIGNIFICANT CHANGE UP (ref 7.35–7.45)
PHOSPHATE SERPL-MCNC: 3 MG/DL — SIGNIFICANT CHANGE UP (ref 2.5–4.5)
PLATELET # BLD AUTO: 205 K/UL — SIGNIFICANT CHANGE UP (ref 150–400)
PO2 BLDA: 78 MMHG — LOW (ref 83–108)
POTASSIUM SERPL-MCNC: 4.4 MMOL/L — SIGNIFICANT CHANGE UP (ref 3.5–5.3)
POTASSIUM SERPL-MCNC: 4.6 MMOL/L — SIGNIFICANT CHANGE UP (ref 3.5–5.3)
POTASSIUM SERPL-SCNC: 4.4 MMOL/L — SIGNIFICANT CHANGE UP (ref 3.5–5.3)
POTASSIUM SERPL-SCNC: 4.6 MMOL/L — SIGNIFICANT CHANGE UP (ref 3.5–5.3)
PROT SERPL-MCNC: 7.4 G/DL — SIGNIFICANT CHANGE UP (ref 6–8.3)
RBC # BLD: 4.98 M/UL — SIGNIFICANT CHANGE UP (ref 3.8–5.2)
RBC # FLD: 14.3 % — SIGNIFICANT CHANGE UP (ref 10.3–14.5)
SAO2 % BLDA: 97.1 % — SIGNIFICANT CHANGE UP (ref 94–98)
SODIUM SERPL-SCNC: 133 MMOL/L — LOW (ref 135–145)
SODIUM SERPL-SCNC: 133 MMOL/L — LOW (ref 135–145)
WBC # BLD: 3.98 K/UL — SIGNIFICANT CHANGE UP (ref 3.8–10.5)
WBC # FLD AUTO: 3.98 K/UL — SIGNIFICANT CHANGE UP (ref 3.8–10.5)

## 2022-10-22 PROCEDURE — 99232 SBSQ HOSP IP/OBS MODERATE 35: CPT

## 2022-10-22 RX ORDER — INSULIN GLARGINE 100 [IU]/ML
45 INJECTION, SOLUTION SUBCUTANEOUS AT BEDTIME
Refills: 0 | Status: DISCONTINUED | OUTPATIENT
Start: 2022-10-22 | End: 2022-10-22

## 2022-10-22 RX ORDER — INSULIN LISPRO 100/ML
12 VIAL (ML) SUBCUTANEOUS
Refills: 0 | Status: DISCONTINUED | OUTPATIENT
Start: 2022-10-22 | End: 2022-10-22

## 2022-10-22 RX ORDER — SODIUM CHLORIDE 9 MG/ML
1000 INJECTION, SOLUTION INTRAVENOUS
Refills: 0 | Status: DISCONTINUED | OUTPATIENT
Start: 2022-10-22 | End: 2022-10-26

## 2022-10-22 RX ORDER — INSULIN LISPRO 100/ML
8 VIAL (ML) SUBCUTANEOUS ONCE
Refills: 0 | Status: COMPLETED | OUTPATIENT
Start: 2022-10-22 | End: 2022-10-22

## 2022-10-22 RX ORDER — ALPRAZOLAM 0.25 MG
0.25 TABLET ORAL ONCE
Refills: 0 | Status: DISCONTINUED | OUTPATIENT
Start: 2022-10-22 | End: 2022-10-26

## 2022-10-22 RX ORDER — INSULIN LISPRO 100/ML
6 VIAL (ML) SUBCUTANEOUS
Refills: 0 | Status: DISCONTINUED | OUTPATIENT
Start: 2022-10-22 | End: 2022-10-22

## 2022-10-22 RX ORDER — INSULIN LISPRO 100/ML
8 VIAL (ML) SUBCUTANEOUS
Refills: 0 | Status: DISCONTINUED | OUTPATIENT
Start: 2022-10-22 | End: 2022-10-22

## 2022-10-22 RX ORDER — INSULIN GLARGINE 100 [IU]/ML
50 INJECTION, SOLUTION SUBCUTANEOUS AT BEDTIME
Refills: 0 | Status: DISCONTINUED | OUTPATIENT
Start: 2022-10-22 | End: 2022-10-23

## 2022-10-22 RX ORDER — INSULIN LISPRO 100/ML
15 VIAL (ML) SUBCUTANEOUS
Refills: 0 | Status: DISCONTINUED | OUTPATIENT
Start: 2022-10-22 | End: 2022-10-23

## 2022-10-22 RX ORDER — INSULIN LISPRO 100/ML
8 VIAL (ML) SUBCUTANEOUS ONCE
Refills: 0 | Status: DISCONTINUED | OUTPATIENT
Start: 2022-10-22 | End: 2022-10-22

## 2022-10-22 RX ORDER — DEXTROSE 50 % IN WATER 50 %
12.5 SYRINGE (ML) INTRAVENOUS ONCE
Refills: 0 | Status: DISCONTINUED | OUTPATIENT
Start: 2022-10-22 | End: 2022-10-26

## 2022-10-22 RX ORDER — DEXTROSE 50 % IN WATER 50 %
25 SYRINGE (ML) INTRAVENOUS ONCE
Refills: 0 | Status: DISCONTINUED | OUTPATIENT
Start: 2022-10-22 | End: 2022-10-26

## 2022-10-22 RX ORDER — DEXTROSE 50 % IN WATER 50 %
15 SYRINGE (ML) INTRAVENOUS ONCE
Refills: 0 | Status: DISCONTINUED | OUTPATIENT
Start: 2022-10-22 | End: 2022-10-26

## 2022-10-22 RX ORDER — INSULIN LISPRO 100/ML
VIAL (ML) SUBCUTANEOUS
Refills: 0 | Status: DISCONTINUED | OUTPATIENT
Start: 2022-10-22 | End: 2022-10-26

## 2022-10-22 RX ORDER — GLUCAGON INJECTION, SOLUTION 0.5 MG/.1ML
1 INJECTION, SOLUTION SUBCUTANEOUS ONCE
Refills: 0 | Status: DISCONTINUED | OUTPATIENT
Start: 2022-10-22 | End: 2022-10-26

## 2022-10-22 RX ORDER — INSULIN LISPRO 100/ML
VIAL (ML) SUBCUTANEOUS AT BEDTIME
Refills: 0 | Status: DISCONTINUED | OUTPATIENT
Start: 2022-10-22 | End: 2022-10-24

## 2022-10-22 RX ADMIN — Medication 3 MILLILITER(S): at 06:04

## 2022-10-22 RX ADMIN — KETOTIFEN FUMARATE 1 DROP(S): 0.34 SOLUTION OPHTHALMIC at 06:06

## 2022-10-22 RX ADMIN — HEPARIN SODIUM 5000 UNIT(S): 5000 INJECTION INTRAVENOUS; SUBCUTANEOUS at 06:05

## 2022-10-22 RX ADMIN — Medication 4 UNIT(S): at 08:30

## 2022-10-22 RX ADMIN — HEPARIN SODIUM 5000 UNIT(S): 5000 INJECTION INTRAVENOUS; SUBCUTANEOUS at 14:09

## 2022-10-22 RX ADMIN — KETOTIFEN FUMARATE 1 DROP(S): 0.34 SOLUTION OPHTHALMIC at 17:11

## 2022-10-22 RX ADMIN — INSULIN GLARGINE 50 UNIT(S): 100 INJECTION, SOLUTION SUBCUTANEOUS at 22:31

## 2022-10-22 RX ADMIN — Medication 6 MILLIGRAM(S): at 06:11

## 2022-10-22 RX ADMIN — OXYCODONE HYDROCHLORIDE 10 MILLIGRAM(S): 5 TABLET ORAL at 12:15

## 2022-10-22 RX ADMIN — Medication 15 UNIT(S): at 22:31

## 2022-10-22 RX ADMIN — OXYCODONE HYDROCHLORIDE 10 MILLIGRAM(S): 5 TABLET ORAL at 13:00

## 2022-10-22 RX ADMIN — ONDANSETRON 4 MILLIGRAM(S): 8 TABLET, FILM COATED ORAL at 14:09

## 2022-10-22 RX ADMIN — NYSTATIN CREAM 1 APPLICATION(S): 100000 CREAM TOPICAL at 22:26

## 2022-10-22 RX ADMIN — POLYETHYLENE GLYCOL 3350 17 GRAM(S): 17 POWDER, FOR SOLUTION ORAL at 06:08

## 2022-10-22 RX ADMIN — Medication 12 UNIT(S): at 17:09

## 2022-10-22 RX ADMIN — Medication 8 UNIT(S): at 15:49

## 2022-10-22 RX ADMIN — ATORVASTATIN CALCIUM 80 MILLIGRAM(S): 80 TABLET, FILM COATED ORAL at 21:24

## 2022-10-22 RX ADMIN — OXYCODONE HYDROCHLORIDE 10 MILLIGRAM(S): 5 TABLET ORAL at 22:23

## 2022-10-22 RX ADMIN — Medication 5: at 08:30

## 2022-10-22 RX ADMIN — Medication 6: at 12:12

## 2022-10-22 RX ADMIN — Medication 40 MILLIGRAM(S): at 06:10

## 2022-10-22 RX ADMIN — Medication 40 MILLIGRAM(S): at 17:10

## 2022-10-22 RX ADMIN — CHLORHEXIDINE GLUCONATE 1 APPLICATION(S): 213 SOLUTION TOPICAL at 06:31

## 2022-10-22 RX ADMIN — HEPARIN SODIUM 5000 UNIT(S): 5000 INJECTION INTRAVENOUS; SUBCUTANEOUS at 22:26

## 2022-10-22 RX ADMIN — Medication 8: at 22:30

## 2022-10-22 RX ADMIN — Medication 3 MILLILITER(S): at 12:14

## 2022-10-22 RX ADMIN — OXYCODONE HYDROCHLORIDE 60 MILLIGRAM(S): 5 TABLET ORAL at 07:21

## 2022-10-22 RX ADMIN — Medication 12: at 17:10

## 2022-10-22 RX ADMIN — NYSTATIN CREAM 1 APPLICATION(S): 100000 CREAM TOPICAL at 14:01

## 2022-10-22 RX ADMIN — Medication 3 MILLILITER(S): at 23:15

## 2022-10-22 RX ADMIN — OXYCODONE HYDROCHLORIDE 60 MILLIGRAM(S): 5 TABLET ORAL at 17:12

## 2022-10-22 RX ADMIN — BUDESONIDE AND FORMOTEROL FUMARATE DIHYDRATE 2 PUFF(S): 160; 4.5 AEROSOL RESPIRATORY (INHALATION) at 06:06

## 2022-10-22 RX ADMIN — LORATADINE 10 MILLIGRAM(S): 10 TABLET ORAL at 17:11

## 2022-10-22 RX ADMIN — POLYETHYLENE GLYCOL 3350 17 GRAM(S): 17 POWDER, FOR SOLUTION ORAL at 17:11

## 2022-10-22 RX ADMIN — Medication 3 MILLILITER(S): at 17:11

## 2022-10-22 RX ADMIN — Medication 4 UNIT(S): at 12:12

## 2022-10-22 RX ADMIN — OXYCODONE HYDROCHLORIDE 60 MILLIGRAM(S): 5 TABLET ORAL at 06:04

## 2022-10-22 RX ADMIN — OXYCODONE HYDROCHLORIDE 10 MILLIGRAM(S): 5 TABLET ORAL at 21:23

## 2022-10-22 RX ADMIN — Medication 2000 UNIT(S): at 12:13

## 2022-10-22 RX ADMIN — NYSTATIN CREAM 1 APPLICATION(S): 100000 CREAM TOPICAL at 06:14

## 2022-10-22 RX ADMIN — REMDESIVIR 500 MILLIGRAM(S): 5 INJECTION INTRAVENOUS at 17:12

## 2022-10-22 RX ADMIN — BUDESONIDE AND FORMOTEROL FUMARATE DIHYDRATE 2 PUFF(S): 160; 4.5 AEROSOL RESPIRATORY (INHALATION) at 17:27

## 2022-10-22 RX ADMIN — Medication 81 MILLIGRAM(S): at 12:13

## 2022-10-22 NOTE — DISCHARGE NOTE PROVIDER - CARE PROVIDERS DIRECT ADDRESSES
[email or web address]

## 2022-10-22 NOTE — DISCHARGE NOTE PROVIDER - DETAILS OF MALNUTRITION DIAGNOSIS/DIAGNOSES
This patient has been assessed with a concern for Malnutrition and was treated during this hospitalization for the following Nutrition diagnosis/diagnoses:     -  10/14/2022: Morbid obesity (BMI > 40)

## 2022-10-22 NOTE — PROGRESS NOTE ADULT - PROBLEM SELECTOR PLAN 1
Pt w/ dyspnea despite supplemental O2, on 5L NC (home 3-4L NC, uses daily nebulizer). Noted to be anasarcic by family prior to admission. Symptoms improved since admission but now stable. Likely 2/2 HF exacerbation given hx, now complicated by COVID+ as below. Duplex w/o evidence of DVT and d-dimer 339, low suspicion for PE.  - TTE 10/19 w/ EF 55-60%, limited findings due to habitus, unable to discern pHTN (previous noted at [facility] on 11/20)     - cardiology consult for further recs as pt s/p 15L+ diuresis w/o sig improvement of sx, unclear status of pHTN     - pCO2 71 this AM, patient would benefit from BIPAP though has previously refuse, will recommend reinitiation  - continue lasix 40mg IV BID for diuresis  - continue duonebs, hold off on steroids for now  - continue singulair (therapeutic interchange for Advair)  - Daily bed weights, strict I/O, fluid restrict Pt w/ dyspnea despite supplemental O2, on 5L NC (home 3-4L NC, uses daily nebulizer). Noted to be anasarcic by family prior to admission. Symptoms improved since admission but now stable. Likely 2/2 HF exacerbation given hx, now complicated by COVID+ as below. Duplex w/o evidence of DVT and d-dimer 339, low suspicion for PE.  - TTE 10/19 w/ EF 55-60%, limited findings due to habitus, unable to discern pHTN (previous noted at [facility] on 11/20)     - cardiology consult for further recs as pt s/p 15L+ diuresis w/o sig improvement of sx, unclear status of pHTN     - pCO2 71 yesterday AM,BiPAP as needed  - continue lasix 40mg IV BID for diuresis  - continue duonebs, dexamethasone 6 IV daily  - continue symbicort (therapeutic interchange for Advair)  - Daily bed weights, strict I/O, fluid restrict Pt w/ dyspnea despite supplemental O2, on 5L NC (home 3-4L NC, uses daily nebulizer). Noted to be anasarcic by family prior to admission. Symptoms improved since admission but now stable. Likely 2/2 HF exacerbation given hx, now complicated by COVID+ as below. Duplex w/o evidence of DVT and d-dimer 339, low suspicion for PE.  - TTE 10/19 w/ EF 55-60%, limited findings due to habitus, unable to discern pHTN (previous noted at [facility] on 11/20)     - appreciate cardiology recs: no RHC iso other issues, consider pulm consult, BIPAP/AVAPS, weight loss     - pCO2 improved today     - will trial AVAPS again tonight with low-dose xanax (0.25) premedication  - continue lasix 40mg IV BID for diuresis  - continue duonebs, dexamethasone 6 IV daily  - continue symbicort (therapeutic interchange for Advair)  - Daily bed weights, strict I/O, fluid restrict

## 2022-10-22 NOTE — DISCHARGE NOTE PROVIDER - NSFOLLOWUPCLINICS_GEN_ALL_ED_FT
[facility] Endocrinology  Endocrinology  [address] [place] [address]  Phone: [phone]  Fax:      [facility] Endocrinology  Endocrinology  [address] [place] [address]  Phone: [phone]  Fax:     [facility]  Nephrology  [address], [address] [place] [address]  Phone: [phone]  Fax:

## 2022-10-22 NOTE — PROGRESS NOTE ADULT - PROBLEM SELECTOR PLAN 6
On Toujeo 80 units AM, 84 units PM + Novolog 20 qAC at home.  - c/w Lantus at reduced dose to 40 qhs for now, titrate as needed  - Hold premeal insulin for now  - Low dose correctional scale - Cr ~1.9. Baseline ~1.6  - Avoid nephrotoxins, dose per GFR

## 2022-10-22 NOTE — PROGRESS NOTE ADULT - ATTENDING COMMENTS
Agree with above, patient here with CHF exacerbation now with COVID as well, monitor, appears comfortable today, but having hyperglycemia. Has had issues with uncontrolled hyperglycemia in past, and seen by endo. Would start daytime lantus 20 units, increase PM to 45 units, and increase premeal to 8 units, and consult endo. pCO2 better today, check in AM, BiPAP as needed. c/w wound care.   ______________  Del Arthur MD  [facility] Medicine  [phone]

## 2022-10-22 NOTE — PROGRESS NOTE ADULT - PROBLEM SELECTOR PLAN 2
COVID+ on 10/20. Patient w/ stable O2 needs however now with worsened wheezes, unclear if HF exacerbation improving and COVID worsening.  - will continue remdesivir, start dex given wheeze COVID+ on 10/20. Patient w/ stable O2 needs however now with worsened wheezes, unclear if HF exacerbation improving and COVID worsening.  - will continue remdesivir, dex given wheeze COVID+ on 10/20. Blood in sputum consistent w/ irritation/inflammation 2/2 to this. Patient w/ stable O2 needs however now with worsened wheezes, unclear if HF exacerbation improving and COVID worsening.  - will continue remdesivir, dex given wheeze

## 2022-10-22 NOTE — DISCHARGE NOTE PROVIDER - HOSPITAL COURSE
HPI:  Patient is a 68 yo F with PMH morbid obesity, asthma, HTN, HLD, DM, CKD3 (BL Cr ~1.6), HFpEF on 3-4L NC oxygen at baseline (not on BiPAP/CPAP qhs), pHTN, endometrial cancer s/p MEGAN/SBO, DVT (resolved), spinal stenosis with chronic back pain, diabetic neuropathy, Charcot foot, chronic venous statis presenting from PCP's office due to concern for HF exacerbation. Patient reports SOB/LEGER for many years, however it has been progressively worsening for the last few months. Patient states that she gets short of breath with every movement, even when being assisted cleaning herself on her shower chair at home. She also states that most nights she wakes up in the middle of the night with severe shortness of breath and has to transition to a reclining chair to sleep. At BL requires 3 pillows to sleep given discomfort/pain from spinal stenosis, but is unable to sleep for prolonged periods regardless. Patient states she does not use her Advair daily but is using her nebulizer daily. States her legs feel more swollen than normal, and reports she feels oozing from her L shin. Reports daily CP of cramping quality x2 months, but states intensity has increased in the last few weeks. The patient also notes that she has been having daily chills for the last 2 weeks, but did not record a fever. Reports dizziness and wheezing (chronic but recent increase in frequency). She denies headache, cough, congestion, sick contacts. Has chronic nausea, but denies vomiting. Denies dysuria, abdominal pain, any changes in urination or stools.     ED course: afebrile, HR 86, BP 140s/80s, RR 20 at 96% on 6L NC. WBC 11.8, Na 140, K 5.7, Cr 1.55, BNP 1174. RVP/COV neg. CXR clear. EKG unremarkable. s/p Lasix 80 IV x1, Lokelma x1, oxy 60 x1. BCx sent. (12 Oct 2022 21:07)    Hospital course:  Diuresis was initiated as patient had signs of heart failure exacerbation and anasarca on exam. Improvement of symptoms was observed on the second day however on subsequent days patient's O2 requirements and respiratory symptoms were stable despite 15L+ of aggressive diuresis over the course of over a week (lower extremity edema appeared to be improving thus diuresis was continued). TTE was performed to evaluate for known history of pHTN however was unrevealing due to the patient's habitus. BIPAP/AVAPS was trialed to address patient's hypercarbia and high suspicion for obesity hypoventilation syndrome however the patient was unable to tolerate treatment due to expiratory pressures and claustrophobia. Course of admission was complicated by a COVID infection which produced notable symptoms (cough and wheezing) but no increase in O2 requirements; therefore remdesivir and dexamethasone treatment were initiated. HPI:  Patient is a 66 yo F with PMH morbid obesity, asthma, HTN, HLD, DM, CKD3 (BL Cr ~1.6), HFpEF on 3-4L NC oxygen at baseline (not on BiPAP/CPAP qhs), pHTN, endometrial cancer s/p MEGAN/SBO, DVT (resolved), spinal stenosis with chronic back pain, diabetic neuropathy, Charcot foot, chronic venous statis presenting from PCP's office due to concern for HF exacerbation. Patient reports SOB/LEGER for many years, however it has been progressively worsening for the last few months. Patient states that she gets short of breath with every movement, even when being assisted cleaning herself on her shower chair at home. She also states that most nights she wakes up in the middle of the night with severe shortness of breath and has to transition to a reclining chair to sleep. At BL requires 3 pillows to sleep given discomfort/pain from spinal stenosis, but is unable to sleep for prolonged periods regardless. Patient states she does not use her Advair daily but is using her nebulizer daily. States her legs feel more swollen than normal, and reports she feels oozing from her L shin. Reports daily CP of cramping quality x2 months, but states intensity has increased in the last few weeks. The patient also notes that she has been having daily chills for the last 2 weeks, but did not record a fever. Reports dizziness and wheezing (chronic but recent increase in frequency). She denies headache, cough, congestion, sick contacts. Has chronic nausea, but denies vomiting. Denies dysuria, abdominal pain, any changes in urination or stools.     ED course: afebrile, HR 86, BP 140s/80s, RR 20 at 96% on 6L NC. WBC 11.8, Na 140, K 5.7, Cr 1.55, BNP 1174. RVP/COV neg. CXR clear. EKG unremarkable. s/p Lasix 80 IV x1, Lokelma x1, oxy 60 x1. BCx sent.     Hospital course:  Diuresis was initiated as patient had signs of heart failure exacerbation and anasarca on exam. Improvement of symptoms was observed on the second day however on subsequent days patient's O2 requirements and respiratory symptoms were stable despite 15L+ of aggressive diuresis over the course of over a week (lower extremity edema appeared to be improving thus diuresis was continued). TTE was performed to evaluate for known history of pHTN however was unrevealing due to the patient's habitus. BIPAP/AVAPS was trialed to address patient's hypercarbia and high suspicion for obesity hypoventilation syndrome however the patient was unable to tolerate treatment due to expiratory pressures and claustrophobia. Course of admission was complicated by a COVID infection which produced notable symptoms (cough and wheezing) but no increase in O2 requirements; therefore remdesivir and dexamethasone treatment were initiated; remdesivir stopped early due to JACQUES. Course c/b hyperglycemia in s/o steroids and endocrine was consulted; patient required frequent uptitration and glucose levels normalized upon dc.  Insulin regimen as follows: lantus 20qAM and qhs; Admelog 20 qAC. Lasix at dc 40mg PO qd. Stable for discharge with cardiology, pulmonology, and PCP follow-up. HPI:  Patient is a 66 yo F with PMH morbid obesity, asthma, HTN, HLD, DM, CKD3 (BL Cr ~1.6), HFpEF on 3-4L NC oxygen at baseline (not on BiPAP/CPAP qhs), pHTN, endometrial cancer s/p MEGAN/SBO, DVT (resolved), spinal stenosis with chronic back pain, diabetic neuropathy, Charcot foot, chronic venous statis presenting from PCP's office due to concern for HF exacerbation. Patient reports SOB/LEGER for many years, however it has been progressively worsening for the last few months. Patient states that she gets short of breath with every movement, even when being assisted cleaning herself on her shower chair at home. She also states that most nights she wakes up in the middle of the night with severe shortness of breath and has to transition to a reclining chair to sleep. At BL requires 3 pillows to sleep given discomfort/pain from spinal stenosis, but is unable to sleep for prolonged periods regardless. Patient states she does not use her Advair daily but is using her nebulizer daily. States her legs feel more swollen than normal, and reports she feels oozing from her L shin. Reports daily CP of cramping quality x2 months, but states intensity has increased in the last few weeks. The patient also notes that she has been having daily chills for the last 2 weeks, but did not record a fever. Reports dizziness and wheezing (chronic but recent increase in frequency). She denies headache, cough, congestion, sick contacts. Has chronic nausea, but denies vomiting. Denies dysuria, abdominal pain, any changes in urination or stools.     ED course: afebrile, HR 86, BP 140s/80s, RR 20 at 96% on 6L NC. WBC 11.8, Na 140, K 5.7, Cr 1.55, BNP 1174. RVP/COV neg. CXR clear. EKG unremarkable. s/p Lasix 80 IV x1, Lokelma x1, oxy 60 x1. BCx sent.     Hospital course:  Diuresis was initiated as patient had signs of heart failure exacerbation and anasarca on exam. Improvement of symptoms was observed on the second day however on subsequent days patient's O2 requirements and respiratory symptoms were stable despite 15L+ of aggressive diuresis over the course of over a week (lower extremity edema appeared to be improving thus diuresis was continued). TTE was performed to evaluate for known history of pHTN however was unrevealing due to the patient's habitus. BIPAP/AVAPS was trialed to address patient's hypercarbia and high suspicion for obesity hypoventilation syndrome however the patient was unable to tolerate treatment due to expiratory pressures and claustrophobia. Course of admission was complicated by a COVID infection which produced notable symptoms (cough and wheezing) but no increase in O2 requirements; therefore remdesivir and dexamethasone treatment were initiated; remdesivir stopped early due to JACQUES. Course c/b hyperglycemia in s/o steroids and endocrine was consulted; patient required frequent uptitration and glucose levels normalized upon dc.  Insulin regimen as follows: lantus 60qAM and qhs; Admelog 22 qAC. Lasix at dc 40mg PO qd, lisinopril held due to JACQUES. Stable for discharge with cardiology, pulmonology, and PCP follow-up.

## 2022-10-22 NOTE — PROGRESS NOTE ADULT - SUBJECTIVE AND OBJECTIVE BOX
Internal Medicine   Alejandro Mckeon | PGY-1    OVERNIGHT EVENTS: No acute overnight events.    SUBJECTIVE: Reports mild bleeding from her nose and in her sputum, nose feels dry and throat sore. Cough persists, seems to be a little better. Shortness of breath stable (continues to worsen w/ exertion). Notes new abrasion wound under her abdominal pannus 2/2 manipulation yesterday, painful, requesting wound care evaluation. Denies fevers, chills, chest pain. Willing to try BIPAP/AVAPs again w/ anxiety premedication, but no guarantees.    MEDICATIONS  (STANDING):  albuterol/ipratropium for Nebulization 3 milliLiter(s) Nebulizer every 6 hours  ammonium lactate 12% Lotion 1 Application(s) Topical every 12 hours  aspirin enteric coated 81 milliGRAM(s) Oral daily  atorvastatin 80 milliGRAM(s) Oral at bedtime  budesonide 160 MICROgram(s)/formoterol 4.5 MICROgram(s) Inhaler 2 Puff(s) Inhalation two times a day  chlorhexidine 2% Cloths 1 Application(s) Topical <User Schedule>  cholecalciferol 2000 Unit(s) Oral daily  dexAMETHasone  Injectable 6 milliGRAM(s) IV Push daily  dextrose 5%. 1000 milliLiter(s) (50 mL/Hr) IV Continuous <Continuous>  dextrose 5%. 1000 milliLiter(s) (100 mL/Hr) IV Continuous <Continuous>  dextrose 50% Injectable 25 Gram(s) IV Push once  dextrose 50% Injectable 12.5 Gram(s) IV Push once  dextrose 50% Injectable 25 Gram(s) IV Push once  furosemide   Injectable 40 milliGRAM(s) IV Push every 12 hours  glucagon  Injectable 1 milliGRAM(s) IntraMuscular once  heparin   Injectable 5000 Unit(s) SubCutaneous every 8 hours  insulin glargine Injectable (LANTUS) 45 Unit(s) SubCutaneous at bedtime  insulin lispro (ADMELOG) corrective regimen sliding scale   SubCutaneous three times a day before meals  insulin lispro (ADMELOG) corrective regimen sliding scale   SubCutaneous at bedtime  insulin lispro Injectable (ADMELOG) 12 Unit(s) SubCutaneous three times a day before meals  ketotifen 0.025% Ophthalmic Solution 1 Drop(s) Both EYES two times a day  loratadine 10 milliGRAM(s) Oral daily  nystatin Powder 1 Application(s) Topical three times a day  oxyCODONE  ER Tablet 60 milliGRAM(s) Oral every 12 hours  polyethylene glycol 3350 17 Gram(s) Oral every 12 hours  remdesivir  IVPB 100 milliGRAM(s) IV Intermittent every 24 hours  remdesivir  IVPB   IV Intermittent     MEDICATIONS  (PRN):  acetaminophen     Tablet .. 650 milliGRAM(s) Oral every 6 hours PRN Temp greater or equal to 38C (100.4F), Mild Pain (1 - 3)  ALPRAZolam 0.25 milliGRAM(s) Oral once PRN premedication  benzocaine 15 mG/menthol 3.6 mG Lozenge 1 Lozenge Oral daily PRN Sore Throat  dextrose Oral Gel 15 Gram(s) Oral once PRN Blood Glucose LESS THAN 70 milliGRAM(s)/deciliter  lactulose Syrup 20 Gram(s) Oral daily PRN constipation  ondansetron    Tablet 4 milliGRAM(s) Oral every 8 hours PRN Nausea and/or Vomiting  oxyCODONE    IR 10 milliGRAM(s) Oral every 4 hours PRN Severe Pain (7 - 10)    VITALS:  T(F): 97.6 (10-22-22 @ 11:17), Max: 98.4 (10-21-22 @ 19:48)  HR: 69 (10-22-22 @ 11:17) (69 - 96)  BP: 154/68 (10-22-22 @ 11:17) (125/70 - 155/69)  BP(mean): --  RR: 18 (10-22-22 @ 11:17) (18 - 18)  SpO2: 99% (10-22-22 @ 11:17) (96% - 99%)    PHYSICAL EXAM:   GENERAL: NAD, resting in bed, blood noted in tissue  HEAD: Atraumatic, normocephalic  EYES: EOMI, conjunctiva and sclera clear, no nystagmus noted  CHEST/LUNG: Difficult to assess as patient coughs w/ inspiration; wheezing appears improved compared to yesterday though some persists, no crackles though not well assessed due to habitus, unlabored respirations  HEART: Regular rate and rhythm; no murmurs, rubs, or gallops, normal S1/S2  ABDOMEN: Firm abdominal pannus; soft, nontender, nondistended  EXTREMITIES: No clubbing, cyanosis, or edema  MSK: No gross deformities noted   NEURO: Grossly nonfocal  SKIN: Venous stasis dermatitis on bl ankles as previously noted, intertrigo as previously appreciated, ~2" abrasion wound noted on photo presented by patient, exposed dermis  PSYCH: Appropriate mood, affect     LABS:                      13.5   3.98  )-----------( 205      ( 22 Oct 2022 14:22 )             44.4     10-22  133<L>  |  90<L>  |  52<H>  ----------------------------<  524<HH>  4.6   |  27  |  1.60<H>    Ca    9.6      22 Oct 2022 14:22  Phos  3.0     10-22  Mg     2.0     10-22    TPro  7.4  /  Alb  3.7  /  TBili  0.7  /  DBili  x   /  AST  39  /  ALT  34  /  AlkPhos  150<H>  10-22    Creatinine Trend: 1.60<--, 1.98<--, 1.94<--, 1.97<--, 1.86<--, 1.80<--    I&O's Summary  21 Oct 2022 07:01  -  22 Oct 2022 07:00  --------------------------------------------------------  IN: 630 mL / OUT: 2550 mL / NET: -1920 mL    22 Oct 2022 07:01  -  22 Oct 2022 16:08  --------------------------------------------------------  IN: 200 mL / OUT: 800 mL / NET: -600 mL

## 2022-10-22 NOTE — PROGRESS NOTE ADULT - PROBLEM SELECTOR PLAN 5
- Cr ~1.9. Baseline ~1.6  - Avoid nephrotoxins, dose per GFR Alk phos elevated to 137. GGT 79 suggests hepatic source however likely elevated 2/2 obesity as AST/ALT and bili wnl.

## 2022-10-22 NOTE — DISCHARGE NOTE PROVIDER - NSDCCPCAREPLAN_GEN_ALL_CORE_FT
PRINCIPAL DISCHARGE DIAGNOSIS  Diagnosis: Acute on chronic respiratory failure with hypoxia  Assessment and Plan of Treatment: You presented to the hospital with significant shortness of breath, anasarca (swelling throughout the body), and increased oxygen requirements. We suspected that this was due to a heart failure exacerbation and thus you were started on aggressive diuresis to remove excess fluid from the body. This appeared to improve the swelling in your arms and legs however your shortness of breath did not seem to change significantly. As you had evidence of hypercarbia (carbon dioxide buildup), we suspect that your shortness of breath may be caused by obesity hypoventilation syndrome (OHS), a phenomenon in which you have decreased breathing especially during sleep. Another consideration is pulmonary hypertension (pHTN), which may be worsened by OHS. We were unable to evaluate to what degree you have pulmonary hypertension as the echocardiogram was unrevealing and you were not a candidate for a right heart catheterization (which would measure the pressures in the chambers of your heart). The treatment for both OHS and pulmonary hypertension would be with BIPAP/AVAPS, which you were unable to tolerate during this admission. Weight loss would lower the risk of OHS/pHTN - we recommend that you follow-up with your primary care doctor to discuss if there are potential treatments to help manage your weight. Otherwise, please take your heart medications and inhalers as prescribed. Please seek medical attention if you develop worsening shortness of breath or notice buildup of fluid in your arms or legs.      SECONDARY DISCHARGE DIAGNOSES  Diagnosis: 2019 novel coronavirus disease (COVID-19)  Assessment and Plan of Treatment: You had an incidental COVID-19 infection during this admission. Because you were having symptoms and were already on O2 supplementation, you were treated with remdesivir and dexamethasone to prevent the infection from becoming severe.

## 2022-10-22 NOTE — DISCHARGE NOTE PROVIDER - CARE PROVIDER_API CALL
Paula Mccann)  Internal Medicine  [address] [facility], [address]  [place] [address]  Phone: [phone]  Fax: [phone]  Follow Up Time: 2 weeks    Vanda Gary)  Adv Heart Fail Trnsplnt Cardio; Cardiovascular Disease; Internal Medicine  [address], [address] [place] [address]  Phone: [phone]  Fax: [phone]  Follow Up Time: Routine

## 2022-10-22 NOTE — PROGRESS NOTE ADULT - PROBLEM SELECTOR PLAN 10
DVT PPx: subQ heparin  Diet: DASH/CC  GI PPx: miralax + lactulose PRN (per pt pref)  Dispo: Pending medical course  GOC: Patient states she has advanced directives/will at home. DNR with trial of intubation. Patient's son to bring in paperwork from home

## 2022-10-22 NOTE — DISCHARGE NOTE PROVIDER - NSDCFUADDAPPT_GEN_ALL_CORE_FT
Please schedule follow-up appointments with your PCP, Dr. Mccann, and your cardiologist, Dr. Gary.     Please also schedule follow up with an endocrinologist (clinic # provided).  Please schedule follow-up appointments with your PCP, Dr. Mccann, and your cardiologist, Dr. Gary.     Please also schedule follow up with an endocrinologist (clinic # provided), and a nephrologist (kidney doctor).

## 2022-10-22 NOTE — DISCHARGE NOTE PROVIDER - NSDCMRMEDTOKEN_GEN_ALL_CORE_FT
ADVAIR -21 MCG INHALER: TAKE 2 PUFFS BY MOUTH TWICE A DAY  Aspirin Enteric Coated 81 mg oral delayed release tablet: 1 tab(s) orally once a day  furosemide 40 mg oral tablet: 1 tab(s) orally once a day  lisinopril 5 mg oral tablet: 1 tab(s) orally once a day  NovoLOG FlexPen 100 units/mL injectable solution: 20 unit(s) injectable 3 times a day (before meals)  nystatin 100,000 units/g topical powder: 1 application topically 2 times a day  ondansetron 4 mg oral tablet: 1 tab(s) orally 3 times a day, As Needed  OxyCONTIN 60 mg oral tablet, extended release: 1 tab(s) orally every 12 hours  polyethylene glycol 3350 oral powder for reconstitution: 17 gram(s) orally once a day  ProAir HFA 90 mcg/inh inhalation aerosol: 2 puff(s) inhaled every 6 hours, As Needed  rosuvastatin 20 mg oral tablet: 1 tab(s) orally once a day (at bedtime)  Toujeo SoloStar 300 units/mL subcutaneous solution: 80 units in the morning and 84 units at night  Vitamin D2 2000 intl units (50 mcg) oral capsule: 1 cap(s) orally once a day   ADVAIR -21 MCG INHALER: TAKE 2 PUFFS BY MOUTH TWICE A DAY  alcohol swabs : Apply topically to affected area 4 times a day   Aspirin Enteric Coated 81 mg oral delayed release tablet: 1 tab(s) orally once a day  furosemide 40 mg oral tablet: 1 tab(s) orally once a day  glucometer (per patient&#x27;s insurance): Test blood sugars four times a day. Dispense #1 glucometer.  lancets: 1 application subcutaneously 4 times a day   lisinopril 5 mg oral tablet: 1 tab(s) orally once a day  loratadine 10 mg oral tablet: 1 tab(s) orally once a day  NovoLOG FlexPen 100 units/mL injectable solution: 20 unit(s) injectable 3 times a day (before meals)  nystatin 100,000 units/g topical powder: 1 application topically 2 times a day  ondansetron 4 mg oral tablet: 1 tab(s) orally 3 times a day, As Needed  OxyCONTIN 60 mg oral tablet, extended release: 1 tab(s) orally every 12 hours  polyethylene glycol 3350 oral powder for reconstitution: 17 gram(s) orally once a day  ProAir HFA 90 mcg/inh inhalation aerosol: 2 puff(s) inhaled every 6 hours, As Needed  rosuvastatin 20 mg oral tablet: 1 tab(s) orally once a day (at bedtime)  test strips (per patient&#x27;s insurance): 1 application subcutaneously 4 times a day. ** Compatible with patient&#x27;s glucometer **  Toujeo SoloStar 300 units/mL subcutaneous solution: 60 units in the morning and 60 units at night  Vitamin D2 2000 intl units (50 mcg) oral capsule: 1 cap(s) orally once a day   ADVAIR -21 MCG INHALER: TAKE 2 PUFFS BY MOUTH TWICE A DAY  alcohol swabs : Apply topically to affected area 4 times a day   Aspirin Enteric Coated 81 mg oral delayed release tablet: 1 tab(s) orally once a day  furosemide 40 mg oral tablet: 1 tab(s) orally once a day  glucometer (per patient&#x27;s insurance): Test blood sugars four times a day. Dispense #1 glucometer.  lancets: 1 application subcutaneously 4 times a day   lisinopril 5 mg oral tablet: 1 tab(s) orally once a day  loratadine 10 mg oral tablet: 1 tab(s) orally once a day  NovoLOG FlexPen 100 units/mL injectable solution: 20 unit(s) injectable 3 times a day (before meals)  nystatin 100,000 units/g topical powder: 1 application topically 2 times a day  ondansetron 4 mg oral tablet: 1 tab(s) orally 3 times a day, As Needed  OxyCONTIN 60 mg oral tablet, extended release: 1 tab(s) orally every 12 hours  polyethylene glycol 3350 oral powder for reconstitution: 17 gram(s) orally once a day  ProAir HFA 90 mcg/inh inhalation aerosol: 2 puff(s) inhaled every 6 hours, As Needed  rosuvastatin 20 mg oral tablet: 1 tab(s) orally once a day (at bedtime)  test strips (per patient&#x27;s insurance): 1 application subcutaneously 4 times a day. ** Compatible with patient&#x27;s glucometer **  Toujeo SoloStar 300 units/mL subcutaneous solution: 20 unit(s) subcutaneous 2 times a day - once in AM and once at bedtime  Vitamin D2 2000 intl units (50 mcg) oral capsule: 1 cap(s) orally once a day   ADVAIR -21 MCG INHALER: TAKE 2 PUFFS BY MOUTH TWICE A DAY  alcohol swabs : Apply topically to affected area 4 times a day   Aspirin Enteric Coated 81 mg oral delayed release tablet: 1 tab(s) orally once a day  furosemide 40 mg oral tablet: 1 tab(s) orally once a day  glucometer (per patient&#x27;s insurance): Test blood sugars four times a day. Dispense #1 glucometer.  lancets: 1 application subcutaneously 4 times a day   loratadine 10 mg oral tablet: 1 tab(s) orally once a day  NovoLOG FlexPen 100 units/mL injectable solution: 22 unit(s) injectable 3 times a day (before meals)  nystatin 100,000 units/g topical powder: 1 application topically 2 times a day  ondansetron 4 mg oral tablet: 1 tab(s) orally 3 times a day, As Needed  OxyCONTIN 60 mg oral tablet, extended release: 1 tab(s) orally every 12 hours  polyethylene glycol 3350 oral powder for reconstitution: 17 gram(s) orally once a day  ProAir HFA 90 mcg/inh inhalation aerosol: 2 puff(s) inhaled every 6 hours, As Needed  rosuvastatin 20 mg oral tablet: 1 tab(s) orally once a day (at bedtime)  test strips (per patient&#x27;s insurance): 1 application subcutaneously 4 times a day. ** Compatible with patient&#x27;s glucometer **  Toujeo SoloStar 300 units/mL subcutaneous solution: 60 unit(s) subcutaneous 2 times a day - once in AM and once at bedtime  Vitamin D2 2000 intl units (50 mcg) oral capsule: 1 cap(s) orally once a day

## 2022-10-22 NOTE — PROGRESS NOTE ADULT - PROBLEM SELECTOR PLAN 3
Patient w/ chronic lower back and leg pain 2/2 spinal stenosis. Largely bedbound at baseline. Has oxycontin 60mg Q12H at home, per patient recently dc'ed short acting meds upon discussion w/ her internist as she did not want to feel drowsy.  - continue home oxycontin 60mg Q12H  - oxy 10mg PO Q4H PRN for breakthrough pain On Toujeo 80 units AM, 84 units PM + Novolog 20 qAC at home. Hyperglycemic today to 500+, likely exacerbated by dexamethasone started 10/21. Curbsided endocrinology fellow, greatly appreciate recs:  - Increase lantus qHS from 42->45U  - Increase premeal insulin from 4->12U, will titrate back down when off dex  - Moderate dose correctional scale

## 2022-10-22 NOTE — DISCHARGE NOTE PROVIDER - PROVIDER TOKENS
PROVIDER:[TOKEN:[[id number]],FOLLOWUP:[2 weeks]],PROVIDER:[TOKEN:[[id number]],FOLLOWUP:[Routine]]

## 2022-10-23 DIAGNOSIS — E11.65 TYPE 2 DIABETES MELLITUS WITH HYPERGLYCEMIA: ICD-10-CM

## 2022-10-23 DIAGNOSIS — E78.5 HYPERLIPIDEMIA, UNSPECIFIED: ICD-10-CM

## 2022-10-23 LAB
ANION GAP SERPL CALC-SCNC: 14 MMOL/L — SIGNIFICANT CHANGE UP (ref 5–17)
BASE EXCESS BLDV CALC-SCNC: 11 MMOL/L — HIGH (ref -2–3)
BUN SERPL-MCNC: 57 MG/DL — HIGH (ref 7–23)
CALCIUM SERPL-MCNC: 9.8 MG/DL — SIGNIFICANT CHANGE UP (ref 8.4–10.5)
CHLORIDE SERPL-SCNC: 91 MMOL/L — LOW (ref 96–108)
CO2 BLDV-SCNC: 40 MMOL/L — HIGH (ref 22–26)
CO2 SERPL-SCNC: 29 MMOL/L — SIGNIFICANT CHANGE UP (ref 22–31)
CREAT SERPL-MCNC: 1.76 MG/DL — HIGH (ref 0.5–1.3)
EGFR: 31 ML/MIN/1.73M2 — LOW
GLUCOSE BLDC GLUCOMTR-MCNC: 385 MG/DL — HIGH (ref 70–99)
GLUCOSE BLDC GLUCOMTR-MCNC: 408 MG/DL — HIGH (ref 70–99)
GLUCOSE BLDC GLUCOMTR-MCNC: 413 MG/DL — HIGH (ref 70–99)
GLUCOSE BLDC GLUCOMTR-MCNC: 427 MG/DL — HIGH (ref 70–99)
GLUCOSE BLDC GLUCOMTR-MCNC: 438 MG/DL — HIGH (ref 70–99)
GLUCOSE SERPL-MCNC: 466 MG/DL — CRITICAL HIGH (ref 70–99)
HCO3 BLDV-SCNC: 38 MMOL/L — HIGH (ref 22–29)
HCT VFR BLD CALC: 43.6 % — SIGNIFICANT CHANGE UP (ref 34.5–45)
HGB BLD-MCNC: 13.2 G/DL — SIGNIFICANT CHANGE UP (ref 11.5–15.5)
MAGNESIUM SERPL-MCNC: 2.2 MG/DL — SIGNIFICANT CHANGE UP (ref 1.6–2.6)
MCHC RBC-ENTMCNC: 26.6 PG — LOW (ref 27–34)
MCHC RBC-ENTMCNC: 30.3 GM/DL — LOW (ref 32–36)
MCV RBC AUTO: 87.9 FL — SIGNIFICANT CHANGE UP (ref 80–100)
NRBC # BLD: 0 /100 WBCS — SIGNIFICANT CHANGE UP (ref 0–0)
PCO2 BLDV: 62 MMHG — HIGH (ref 39–42)
PH BLDV: 7.4 — SIGNIFICANT CHANGE UP (ref 7.32–7.43)
PHOSPHATE SERPL-MCNC: 2.9 MG/DL — SIGNIFICANT CHANGE UP (ref 2.5–4.5)
PLATELET # BLD AUTO: 242 K/UL — SIGNIFICANT CHANGE UP (ref 150–400)
PO2 BLDV: 62 MMHG — HIGH (ref 25–45)
POTASSIUM SERPL-MCNC: 4.6 MMOL/L — SIGNIFICANT CHANGE UP (ref 3.5–5.3)
POTASSIUM SERPL-SCNC: 4.6 MMOL/L — SIGNIFICANT CHANGE UP (ref 3.5–5.3)
RBC # BLD: 4.96 M/UL — SIGNIFICANT CHANGE UP (ref 3.8–5.2)
RBC # FLD: 14.3 % — SIGNIFICANT CHANGE UP (ref 10.3–14.5)
SAO2 % BLDV: 89.2 % — HIGH (ref 67–88)
SODIUM SERPL-SCNC: 134 MMOL/L — LOW (ref 135–145)
WBC # BLD: 6.53 K/UL — SIGNIFICANT CHANGE UP (ref 3.8–10.5)
WBC # FLD AUTO: 6.53 K/UL — SIGNIFICANT CHANGE UP (ref 3.8–10.5)

## 2022-10-23 PROCEDURE — 99232 SBSQ HOSP IP/OBS MODERATE 35: CPT | Mod: GC

## 2022-10-23 PROCEDURE — 99223 1ST HOSP IP/OBS HIGH 75: CPT

## 2022-10-23 RX ORDER — DEXAMETHASONE 0.5 MG/5ML
6 ELIXIR ORAL DAILY
Refills: 0 | Status: DISCONTINUED | OUTPATIENT
Start: 2022-10-24 | End: 2022-10-24

## 2022-10-23 RX ORDER — INSULIN LISPRO 100/ML
15 VIAL (ML) SUBCUTANEOUS AT BEDTIME
Refills: 0 | Status: DISCONTINUED | OUTPATIENT
Start: 2022-10-23 | End: 2022-10-26

## 2022-10-23 RX ORDER — INSULIN LISPRO 100/ML
8 VIAL (ML) SUBCUTANEOUS ONCE
Refills: 0 | Status: COMPLETED | OUTPATIENT
Start: 2022-10-23 | End: 2022-10-23

## 2022-10-23 RX ORDER — INSULIN GLARGINE 100 [IU]/ML
60 INJECTION, SOLUTION SUBCUTANEOUS AT BEDTIME
Refills: 0 | Status: DISCONTINUED | OUTPATIENT
Start: 2022-10-23 | End: 2022-10-26

## 2022-10-23 RX ORDER — INSULIN LISPRO 100/ML
28 VIAL (ML) SUBCUTANEOUS
Refills: 0 | Status: DISCONTINUED | OUTPATIENT
Start: 2022-10-23 | End: 2022-10-24

## 2022-10-23 RX ADMIN — BUDESONIDE AND FORMOTEROL FUMARATE DIHYDRATE 2 PUFF(S): 160; 4.5 AEROSOL RESPIRATORY (INHALATION) at 05:17

## 2022-10-23 RX ADMIN — KETOTIFEN FUMARATE 1 DROP(S): 0.34 SOLUTION OPHTHALMIC at 17:54

## 2022-10-23 RX ADMIN — Medication 6: at 22:08

## 2022-10-23 RX ADMIN — Medication 81 MILLIGRAM(S): at 11:57

## 2022-10-23 RX ADMIN — Medication 12: at 16:40

## 2022-10-23 RX ADMIN — LORATADINE 10 MILLIGRAM(S): 10 TABLET ORAL at 16:40

## 2022-10-23 RX ADMIN — Medication 3 MILLILITER(S): at 11:43

## 2022-10-23 RX ADMIN — REMDESIVIR 500 MILLIGRAM(S): 5 INJECTION INTRAVENOUS at 17:55

## 2022-10-23 RX ADMIN — BUDESONIDE AND FORMOTEROL FUMARATE DIHYDRATE 2 PUFF(S): 160; 4.5 AEROSOL RESPIRATORY (INHALATION) at 17:54

## 2022-10-23 RX ADMIN — HEPARIN SODIUM 5000 UNIT(S): 5000 INJECTION INTRAVENOUS; SUBCUTANEOUS at 05:18

## 2022-10-23 RX ADMIN — Medication 6 MILLIGRAM(S): at 05:19

## 2022-10-23 RX ADMIN — Medication 3 MILLILITER(S): at 17:53

## 2022-10-23 RX ADMIN — Medication 40 MILLIGRAM(S): at 05:18

## 2022-10-23 RX ADMIN — OXYCODONE HYDROCHLORIDE 60 MILLIGRAM(S): 5 TABLET ORAL at 05:17

## 2022-10-23 RX ADMIN — HEPARIN SODIUM 5000 UNIT(S): 5000 INJECTION INTRAVENOUS; SUBCUTANEOUS at 22:07

## 2022-10-23 RX ADMIN — Medication 3 MILLILITER(S): at 05:18

## 2022-10-23 RX ADMIN — Medication 15 UNIT(S): at 22:10

## 2022-10-23 RX ADMIN — Medication 12: at 08:00

## 2022-10-23 RX ADMIN — Medication 2000 UNIT(S): at 13:08

## 2022-10-23 RX ADMIN — KETOTIFEN FUMARATE 1 DROP(S): 0.34 SOLUTION OPHTHALMIC at 05:20

## 2022-10-23 RX ADMIN — NYSTATIN CREAM 1 APPLICATION(S): 100000 CREAM TOPICAL at 05:19

## 2022-10-23 RX ADMIN — Medication 40 MILLIGRAM(S): at 17:53

## 2022-10-23 RX ADMIN — NYSTATIN CREAM 1 APPLICATION(S): 100000 CREAM TOPICAL at 13:10

## 2022-10-23 RX ADMIN — OXYCODONE HYDROCHLORIDE 10 MILLIGRAM(S): 5 TABLET ORAL at 22:37

## 2022-10-23 RX ADMIN — Medication 15 UNIT(S): at 08:01

## 2022-10-23 RX ADMIN — INSULIN GLARGINE 60 UNIT(S): 100 INJECTION, SOLUTION SUBCUTANEOUS at 22:07

## 2022-10-23 RX ADMIN — Medication 1 APPLICATION(S): at 05:20

## 2022-10-23 RX ADMIN — Medication 28 UNIT(S): at 16:41

## 2022-10-23 RX ADMIN — LACTULOSE 20 GRAM(S): 10 SOLUTION ORAL at 13:08

## 2022-10-23 RX ADMIN — HEPARIN SODIUM 5000 UNIT(S): 5000 INJECTION INTRAVENOUS; SUBCUTANEOUS at 11:58

## 2022-10-23 RX ADMIN — Medication 3 MILLILITER(S): at 22:09

## 2022-10-23 RX ADMIN — Medication 12: at 12:00

## 2022-10-23 RX ADMIN — OXYCODONE HYDROCHLORIDE 60 MILLIGRAM(S): 5 TABLET ORAL at 17:55

## 2022-10-23 RX ADMIN — Medication 8 UNIT(S): at 05:21

## 2022-10-23 RX ADMIN — OXYCODONE HYDROCHLORIDE 10 MILLIGRAM(S): 5 TABLET ORAL at 22:07

## 2022-10-23 RX ADMIN — ATORVASTATIN CALCIUM 80 MILLIGRAM(S): 80 TABLET, FILM COATED ORAL at 22:07

## 2022-10-23 RX ADMIN — NYSTATIN CREAM 1 APPLICATION(S): 100000 CREAM TOPICAL at 22:09

## 2022-10-23 RX ADMIN — Medication 15 UNIT(S): at 12:01

## 2022-10-23 RX ADMIN — OXYCODONE HYDROCHLORIDE 10 MILLIGRAM(S): 5 TABLET ORAL at 11:49

## 2022-10-23 RX ADMIN — BENZOCAINE AND MENTHOL 1 LOZENGE: 5; 1 LIQUID ORAL at 06:32

## 2022-10-23 RX ADMIN — CHLORHEXIDINE GLUCONATE 1 APPLICATION(S): 213 SOLUTION TOPICAL at 05:17

## 2022-10-23 NOTE — PROGRESS NOTE ADULT - ASSESSMENT
66F with PMH morbid obesity, moderate persistant asthma, HTN, HLD, DM, CKD3, HFpEF on 4-6L NC oxygen at baseline (not on BiPAP/CPAP qhs), presenting with SOB/LEGER most consistent w/ heart failure exacerbation. 66F with PMH morbid obesity, moderate persistant asthma, HTN, HLD, DM, CKD3, HFpEF on 4-6L NC oxygen at baseline (not on BiPAP/CPAP qhs), presenting with SOB/LEGER most consistent w/ heart failure exacerbation now improving with Lasix 40IV bid, course c.b COVID on Remdesevir and Dex

## 2022-10-23 NOTE — CONSULT NOTE ADULT - CONSULT REASON
Uncontrolled Type 2 DM w/ hyperglycemia exacerbated by steroids.
BLE wounds/ moisture dermatitis
SOB

## 2022-10-23 NOTE — PROGRESS NOTE ADULT - SUBJECTIVE AND OBJECTIVE BOX
Opal Perez MD  PGY 2 Department of Internal Medicine  Pager: [phone] ([facility]) /[phone] ([facility])       Patient is a 67y old  Female who presents with a chief complaint of HF exacerbation (22 Oct 2022 20:46)      SUBJECTIVE / OVERNIGHT EVENTS: Pt seen and examined. No acute overnight events. Hyperglycemic overnight, BMP ruled out DK. on 4L NC overnight         MEDICATIONS  (STANDING):  albuterol/ipratropium for Nebulization 3 milliLiter(s) Nebulizer every 6 hours  ammonium lactate 12% Lotion 1 Application(s) Topical every 12 hours  aspirin enteric coated 81 milliGRAM(s) Oral daily  atorvastatin 80 milliGRAM(s) Oral at bedtime  budesonide 160 MICROgram(s)/formoterol 4.5 MICROgram(s) Inhaler 2 Puff(s) Inhalation two times a day  chlorhexidine 2% Cloths 1 Application(s) Topical <User Schedule>  cholecalciferol 2000 Unit(s) Oral daily  dextrose 5%. 1000 milliLiter(s) (50 mL/Hr) IV Continuous <Continuous>  dextrose 5%. 1000 milliLiter(s) (100 mL/Hr) IV Continuous <Continuous>  dextrose 50% Injectable 25 Gram(s) IV Push once  dextrose 50% Injectable 12.5 Gram(s) IV Push once  dextrose 50% Injectable 25 Gram(s) IV Push once  furosemide   Injectable 40 milliGRAM(s) IV Push every 12 hours  glucagon  Injectable 1 milliGRAM(s) IntraMuscular once  heparin   Injectable 5000 Unit(s) SubCutaneous every 8 hours  insulin glargine Injectable (LANTUS) 50 Unit(s) SubCutaneous at bedtime  insulin lispro (ADMELOG) corrective regimen sliding scale   SubCutaneous three times a day before meals  insulin lispro (ADMELOG) corrective regimen sliding scale   SubCutaneous at bedtime  insulin lispro Injectable (ADMELOG) 15 Unit(s) SubCutaneous three times a day with meals  ketotifen 0.025% Ophthalmic Solution 1 Drop(s) Both EYES two times a day  loratadine 10 milliGRAM(s) Oral daily  nystatin Powder 1 Application(s) Topical three times a day  oxyCODONE  ER Tablet 60 milliGRAM(s) Oral every 12 hours  polyethylene glycol 3350 17 Gram(s) Oral every 12 hours  remdesivir  IVPB 100 milliGRAM(s) IV Intermittent every 24 hours  remdesivir  IVPB   IV Intermittent     MEDICATIONS  (PRN):  acetaminophen     Tablet .. 650 milliGRAM(s) Oral every 6 hours PRN Temp greater or equal to 38C (100.4F), Mild Pain (1 - 3)  ALPRAZolam 0.25 milliGRAM(s) Oral once PRN premedication  benzocaine 15 mG/menthol 3.6 mG Lozenge 1 Lozenge Oral daily PRN Sore Throat  dextrose Oral Gel 15 Gram(s) Oral once PRN Blood Glucose LESS THAN 70 milliGRAM(s)/deciliter  lactulose Syrup 20 Gram(s) Oral daily PRN constipation  ondansetron    Tablet 4 milliGRAM(s) Oral every 8 hours PRN Nausea and/or Vomiting  oxyCODONE    IR 10 milliGRAM(s) Oral every 4 hours PRN Severe Pain (7 - 10)      I&O's Summary    22 Oct 2022 07:01  -  23 Oct 2022 07:00  --------------------------------------------------------  IN: 700 mL / OUT: 2400 mL / NET: -1700 mL        Vital Signs Last 24 Hrs  T(C): 36.6 (23 Oct 2022 05:15), Max: 36.6 (22 Oct 2022 22:00)  T(F): 97.9 (23 Oct 2022 05:15), Max: 97.9 (22 Oct 2022 22:00)  HR: 69 (23 Oct 2022 05:15) (69 - 84)  BP: 136/65 (23 Oct 2022 05:15) (129/78 - 154/68)  BP(mean): 89 (23 Oct 2022 05:15) (89 - 89)  RR: 20 (23 Oct 2022 05:15) (18 - 20)  SpO2: 98% (23 Oct 2022 05:15) (96% - 99%)    Parameters below as of 23 Oct 2022 05:15  Patient On (Oxygen Delivery Method): nasal cannula  O2 Flow (L/min): 4      CAPILLARY BLOOD GLUCOSE      POCT Blood Glucose.: 408 mg/dL (23 Oct 2022 07:42)  POCT Blood Glucose.: 438 mg/dL (23 Oct 2022 04:36)  POCT Blood Glucose.: 475 mg/dL (22 Oct 2022 23:49)  POCT Blood Glucose.: 485 mg/dL (22 Oct 2022 23:48)  POCT Blood Glucose.: 509 mg/dL (22 Oct 2022 22:00)  POCT Blood Glucose.: 482 mg/dL (22 Oct 2022 21:59)  POCT Blood Glucose.: 500 mg/dL (22 Oct 2022 16:46)  POCT Blood Glucose.: 465 mg/dL (22 Oct 2022 16:44)  POCT Blood Glucose.: 468 mg/dL (22 Oct 2022 15:21)  POCT Blood Glucose.: 432 mg/dL (22 Oct 2022 11:51)      PHYSICAL EXAM:  GENERAL: NAD, resting in bed, blood noted in tissue  HEAD: Atraumatic, normocephalic  EYES: EOMI, conjunctiva and sclera clear, no nystagmus noted  CHEST/LUNG: Difficult to assess as patient coughs w/ inspiration; wheezing appears improved compared to yesterday though some persists, no crackles though not well assessed due to habitus, unlabored respirations  HEART: Regular rate and rhythm; no murmurs, rubs, or gallops, normal S1/S2  ABDOMEN: Firm abdominal pannus; soft, nontender, nondistended  EXTREMITIES: No clubbing, cyanosis, or edema  MSK: No gross deformities noted   NEURO: Grossly nonfocal  SKIN: Venous stasis dermatitis on bl ankles as previously noted, intertrigo as previously appreciated, ~2" abrasion wound noted on photo presented by patient, exposed dermis  PSYCH: Appropriate mood, affect        LABS:                        13.2   6.53  )-----------( 242      ( 23 Oct 2022 06:00 )             43.6     Auto Eosinophil # x     / Auto Eosinophil % x     / Auto Neutrophil # x     / Auto Neutrophil % x     / BANDS % x                            13.5   3.98  )-----------( 205      ( 22 Oct 2022 14:22 )             44.4     Auto Eosinophil # x     / Auto Eosinophil % x     / Auto Neutrophil # x     / Auto Neutrophil % x     / BANDS % x        10-23    134<L>  |  91<L>  |  57<H>  ----------------------------<  466<HH>  4.6   |  29  |  1.76<H>  10-22    133<L>  |  88<L>  |  58<H>  ----------------------------<  535<HH>  4.4   |  31  |  1.83<H>  10-22    133<L>  |  90<L>  |  52<H>  ----------------------------<  524<HH>  4.6   |  27  |  1.60<H>    Ca    9.8      23 Oct 2022 06:00  Mg     2.2     10-23  Phos  2.9     10-23  TPro  7.4  /  Alb  3.7  /  TBili  0.7  /  DBili  x   /  AST  39  /  ALT  34  /  AlkPhos  150<H>  10-22       Opal Perez MD  PGY 2 Department of Internal Medicine  Pager: [phone] ([facility]) /[phone] ([facility])       Patient is a 67y old  Female who presents with a chief complaint of HF exacerbation (22 Oct 2022 20:46)      SUBJECTIVE / OVERNIGHT EVENTS: Pt seen and examined. No acute overnight events. Hyperglycemic overnight, BMP ruled out DK. on 4L NC overnight   Patient endorses improvement in abdominal edema and LE edema  Also endorses improvement in wheezing with dexamethasone         MEDICATIONS  (STANDING):  albuterol/ipratropium for Nebulization 3 milliLiter(s) Nebulizer every 6 hours  ammonium lactate 12% Lotion 1 Application(s) Topical every 12 hours  aspirin enteric coated 81 milliGRAM(s) Oral daily  atorvastatin 80 milliGRAM(s) Oral at bedtime  budesonide 160 MICROgram(s)/formoterol 4.5 MICROgram(s) Inhaler 2 Puff(s) Inhalation two times a day  chlorhexidine 2% Cloths 1 Application(s) Topical <User Schedule>  cholecalciferol 2000 Unit(s) Oral daily  dextrose 5%. 1000 milliLiter(s) (50 mL/Hr) IV Continuous <Continuous>  dextrose 5%. 1000 milliLiter(s) (100 mL/Hr) IV Continuous <Continuous>  dextrose 50% Injectable 25 Gram(s) IV Push once  dextrose 50% Injectable 12.5 Gram(s) IV Push once  dextrose 50% Injectable 25 Gram(s) IV Push once  furosemide   Injectable 40 milliGRAM(s) IV Push every 12 hours  glucagon  Injectable 1 milliGRAM(s) IntraMuscular once  heparin   Injectable 5000 Unit(s) SubCutaneous every 8 hours  insulin glargine Injectable (LANTUS) 50 Unit(s) SubCutaneous at bedtime  insulin lispro (ADMELOG) corrective regimen sliding scale   SubCutaneous three times a day before meals  insulin lispro (ADMELOG) corrective regimen sliding scale   SubCutaneous at bedtime  insulin lispro Injectable (ADMELOG) 15 Unit(s) SubCutaneous three times a day with meals  ketotifen 0.025% Ophthalmic Solution 1 Drop(s) Both EYES two times a day  loratadine 10 milliGRAM(s) Oral daily  nystatin Powder 1 Application(s) Topical three times a day  oxyCODONE  ER Tablet 60 milliGRAM(s) Oral every 12 hours  polyethylene glycol 3350 17 Gram(s) Oral every 12 hours  remdesivir  IVPB 100 milliGRAM(s) IV Intermittent every 24 hours  remdesivir  IVPB   IV Intermittent     MEDICATIONS  (PRN):  acetaminophen     Tablet .. 650 milliGRAM(s) Oral every 6 hours PRN Temp greater or equal to 38C (100.4F), Mild Pain (1 - 3)  ALPRAZolam 0.25 milliGRAM(s) Oral once PRN premedication  benzocaine 15 mG/menthol 3.6 mG Lozenge 1 Lozenge Oral daily PRN Sore Throat  dextrose Oral Gel 15 Gram(s) Oral once PRN Blood Glucose LESS THAN 70 milliGRAM(s)/deciliter  lactulose Syrup 20 Gram(s) Oral daily PRN constipation  ondansetron    Tablet 4 milliGRAM(s) Oral every 8 hours PRN Nausea and/or Vomiting  oxyCODONE    IR 10 milliGRAM(s) Oral every 4 hours PRN Severe Pain (7 - 10)      I&O's Summary    22 Oct 2022 07:01  -  23 Oct 2022 07:00  --------------------------------------------------------  IN: 700 mL / OUT: 2400 mL / NET: -1700 mL        Vital Signs Last 24 Hrs  T(C): 36.6 (23 Oct 2022 05:15), Max: 36.6 (22 Oct 2022 22:00)  T(F): 97.9 (23 Oct 2022 05:15), Max: 97.9 (22 Oct 2022 22:00)  HR: 69 (23 Oct 2022 05:15) (69 - 84)  BP: 136/65 (23 Oct 2022 05:15) (129/78 - 154/68)  BP(mean): 89 (23 Oct 2022 05:15) (89 - 89)  RR: 20 (23 Oct 2022 05:15) (18 - 20)  SpO2: 98% (23 Oct 2022 05:15) (96% - 99%)    Parameters below as of 23 Oct 2022 05:15  Patient On (Oxygen Delivery Method): nasal cannula  O2 Flow (L/min): 4      CAPILLARY BLOOD GLUCOSE      POCT Blood Glucose.: 408 mg/dL (23 Oct 2022 07:42)  POCT Blood Glucose.: 438 mg/dL (23 Oct 2022 04:36)  POCT Blood Glucose.: 475 mg/dL (22 Oct 2022 23:49)  POCT Blood Glucose.: 485 mg/dL (22 Oct 2022 23:48)  POCT Blood Glucose.: 509 mg/dL (22 Oct 2022 22:00)  POCT Blood Glucose.: 482 mg/dL (22 Oct 2022 21:59)  POCT Blood Glucose.: 500 mg/dL (22 Oct 2022 16:46)  POCT Blood Glucose.: 465 mg/dL (22 Oct 2022 16:44)  POCT Blood Glucose.: 468 mg/dL (22 Oct 2022 15:21)  POCT Blood Glucose.: 432 mg/dL (22 Oct 2022 11:51)      PHYSICAL EXAM:  GENERAL: NAD, resting in bed, blood noted in tissue  HEAD: Atraumatic, normocephalic  EYES: EOMI, conjunctiva and sclera clear, no nystagmus noted  CHEST/LUNG: Difficult to assess as patient coughs w/ inspiration; wheezing appears improved compared to yesterday though some persists, no crackles though not well assessed due to habitus, unlabored respirations  HEART: Regular rate and rhythm; no murmurs, rubs, or gallops, normal S1/S2  ABDOMEN: Firm abdominal pannus; soft, nontender, nondistended  EXTREMITIES: No clubbing, cyanosis, or edema  MSK: No gross deformities noted   NEURO: Grossly nonfocal  SKIN: Venous stasis dermatitis on bl ankles as previously noted, intertrigo as previously appreciated, ~2" abrasion wound noted on photo presented by patient, exposed dermis  PSYCH: Appropriate mood, affect        LABS:                        13.2   6.53  )-----------( 242      ( 23 Oct 2022 06:00 )             43.6     Auto Eosinophil # x     / Auto Eosinophil % x     / Auto Neutrophil # x     / Auto Neutrophil % x     / BANDS % x                            13.5   3.98  )-----------( 205      ( 22 Oct 2022 14:22 )             44.4     Auto Eosinophil # x     / Auto Eosinophil % x     / Auto Neutrophil # x     / Auto Neutrophil % x     / BANDS % x        10-23    134<L>  |  91<L>  |  57<H>  ----------------------------<  466<HH>  4.6   |  29  |  1.76<H>  10-22    133<L>  |  88<L>  |  58<H>  ----------------------------<  535<HH>  4.4   |  31  |  1.83<H>  10-22    133<L>  |  90<L>  |  52<H>  ----------------------------<  524<HH>  4.6   |  27  |  1.60<H>    Ca    9.8      23 Oct 2022 06:00  Mg     2.2     10-23  Phos  2.9     10-23  TPro  7.4  /  Alb  3.7  /  TBili  0.7  /  DBili  x   /  AST  39  /  ALT  34  /  AlkPhos  150<H>  10-22

## 2022-10-23 NOTE — CONSULT NOTE ADULT - PROBLEM SELECTOR RECOMMENDATION 9
Diabetes Education and Nutrition Eval  Increase Lantus to 60 units qhs  Increase Admelog to 28 units qac  Mod correction scale qac + bedtime  Goal glucose 100-180  Outpt. endo follow-up  Outpt. optho, podiatry, nephrology  Plan to d/c on basal bolus at home doses as A1c close to goal.

## 2022-10-23 NOTE — PROGRESS NOTE ADULT - PROBLEM SELECTOR PLAN 2
COVID+ on 10/20. Blood in sputum consistent w/ irritation/inflammation 2/2 to this. Patient w/ stable O2 needs however now with worsened wheezes, unclear if HF exacerbation improving and COVID worsening.  - will continue remdesivir, dex given wheeze, however will consider discontinuing dex given profound hyperglycemia COVID+ on 10/20. Blood in sputum consistent w/ irritation/inflammation 2/2 to this. Patient w/ stable O2 needs however now with worsened wheezes, unclear if HF exacerbation improving and COVID worsening.  - will continue remdesivir,   - patient endorses clinical improvement of wheezing and sx with Dexamethasone so will continue   - however given steroid induced hyperglycemia, will consult endo

## 2022-10-23 NOTE — PROGRESS NOTE ADULT - ATTENDING COMMENTS
Agree with above, patient here with CHF exacerbation now with COVID as well, monitor, appears comfortable today, but having hyperglycemia. Has had issues with uncontrolled hyperglycemia in past, and seen by endo. Given continued glucose elevation in setting of dexamethasone endo consulted

## 2022-10-23 NOTE — CONSULT NOTE ADULT - SUBJECTIVE AND OBJECTIVE BOX
HPI:  66 y/o F w/ hx of DM2 x 30 years. Complicated by neuropathy with Charcot and nephropathy. Was 1st told she had GDM during a twin pregnancy and then developed DM2. Home regimen is Levemir 80 units in the morning and 85 units qhs and Novolog 22 units with meals. Was previously on Novolin 70/30 76u qam an 70u qhs as well as Humalog 5-5-5, but now on basal bolus. Checks FS's daily with values generally in the 100-300 range without hypoglycemia unless she goes too long without eating. She is overdue for optho. She has had cataracts and narrow angle glaucoma in the past, s/p lasert x. She has sx of neuropathy and PVD and has frequent podiatry home visits. Tries to monitor carbs. No diabetes in parents or siblings. +polyuria and polydipsia. No recent optho follow-up. No family hx of diabetes.   Also hx of morbid obesity, asthma, HTN, HLD, CKD3 (BL Cr ~1.6), HFpEF on 3-4L NC oxygen at baseline (not on BiPAP/CPAP qhs), pHTN, endometrial cancer s/p MEGAN/SBO, DVT (resolved), spinal stenosis with chronic back pain, diabetic neuropathy, Charcot foot, chronic venous statis presenting from PCP's office due to concern for HF exacerbation. Patient reports SOB/LEGER for many years, however it has been progressively worsening for the last few months. Patient states that she gets short of breath with every movement, even when being assisted cleaning herself on her shower chair at home. She also states that most nights she wakes up in the middle of the night with severe shortness of breath and has to transition to a reclining chair to sleep. At BL requires 3 pillows to sleep given discomfort/pain from spinal stenosis, but is unable to sleep for prolonged periods regardless. Patient states she does not use her Advair daily but is using her nebulizer daily. States her legs feel more swollen than normal, and reports she feels oozing from her L shin. Reports daily CP of cramping quality x2 months, but states intensity has increased in the last few weeks. The patient also notes that she has been having daily chills for the last 2 weeks, but did not record a fever. Reports dizziness and wheezing (chronic but recent increase in frequency). She denies headache, cough, congestion, sick contacts. Has chronic nausea, but denies vomiting. Denies dysuria, abdominal pain, any changes in urination or stools.       PAST MEDICAL & SURGICAL HISTORY:  Diabetes Mellitus Type II      HTN (Hypertension)      Endometrial Hyperplasia      Cervical Stenosis of Spine      Spinal Stenosis, Lumbar      Deep Vein Thrombosis (DVT)  Left leg, 2004, treated and resolved      Dyslipidemia      Cataract      Morbid Obesity      Vitamin D deficiency      Insomnia      CKD (chronic kidney disease)  ~ III      Congestive heart failure  ~ HFpEF      Uterine cancer      Asthma      On home oxygen therapy      Gait difficulty  ~ u ses walker      Cataract extracted with lens implant 1998  Right      C Section 1994      Cholecystectomy/appendectomy @ age 25      D&amp;C x2 1980&#x27;s      D&amp;C 2008  hysteroscopy, endometrial hyperplasia, 2009      Cervical Spinal Stenosis surgery x2 (01/2002, 7/2002)      Tonsillectomy as a child      Endometrial biopsy 12/02/09      H/O laser iridotomy  left eye, 2016      H/O colonoscopy  1998      S/P total abdominal hysterectomy and bilateral salpingo-oophorectomy      S/P appendectomy      S/P cholecystectomy          FAMILY HISTORY:  Family history of pancreatic cancer    Family history of bladder cancer    Family history of lung cancer (Grandparent)        Social History: No tobacco or alcohol use    Outpatient Medications:  · 	ondansetron 4 mg oral tablet: Last Dose Taken:  , HOLD While on Levaquin   · 	polyethylene glycol 3350 oral powder for reconstitution: Last Dose Taken:  , 17 gram(s) orally 2 times a day   · 	senna oral tablet: Last Dose Taken:  , 2 tab(s) orally once a day (at bedtime)  · 	furosemide 40 mg oral tablet: Last Dose Taken:  , 1 tab(s) orally once a day  · 	nystatin 100,000 units/g topical powder: Last Dose Taken:  , 1 application topically 2 times a day  · 	Aspirin Enteric Coated 81 mg oral delayed release tablet: Last Dose Taken:  , 1 tab(s) orally once a day  · 	rosuvastatin 20 mg oral tablet: Last Dose Taken:  , 1 tab(s) orally once a day (at bedtime)  · 	Vitamin D2 2000 intl units (50 mcg) oral capsule: Last Dose Taken:  , 1 cap(s) orally once a day  · 	ProAir HFA 90 mcg/inh inhalation aerosol: Last Dose Taken:  , 2 puff(s) inhaled every 6 hours, As Needed  · 	ADVAIR -21 MCG INHALER: Last Dose Taken:  , TAKE 2 PUFFS BY MOUTH TWICE A DAY  · 	IPRATROPIUM INHAL SOLN 60 X 2.5ML: Last Dose Taken:    · 	NovoLOG FlexPen 100 units/mL injectable solution: Last Dose Taken:  , 22 unit(s) injectable 3 times a day (before meals)  · 	Levemir 80 units in the morning and 85 units qhs              OxyCONTIN 60 mg oral tablet, extended release: Last Dose Taken:  , 1 tab(s) orally every 12 hours    MEDICATIONS  (STANDING):  albuterol/ipratropium for Nebulization 3 milliLiter(s) Nebulizer every 6 hours  ammonium lactate 12% Lotion 1 Application(s) Topical every 12 hours  aspirin enteric coated 81 milliGRAM(s) Oral daily  atorvastatin 80 milliGRAM(s) Oral at bedtime  budesonide 160 MICROgram(s)/formoterol 4.5 MICROgram(s) Inhaler 2 Puff(s) Inhalation two times a day  chlorhexidine 2% Cloths 1 Application(s) Topical <User Schedule>  cholecalciferol 2000 Unit(s) Oral daily  dextrose 5%. 1000 milliLiter(s) (50 mL/Hr) IV Continuous <Continuous>  dextrose 5%. 1000 milliLiter(s) (100 mL/Hr) IV Continuous <Continuous>  dextrose 50% Injectable 25 Gram(s) IV Push once  dextrose 50% Injectable 12.5 Gram(s) IV Push once  dextrose 50% Injectable 25 Gram(s) IV Push once  furosemide   Injectable 40 milliGRAM(s) IV Push every 12 hours  glucagon  Injectable 1 milliGRAM(s) IntraMuscular once  heparin   Injectable 5000 Unit(s) SubCutaneous every 8 hours  insulin glargine Injectable (LANTUS) 60 Unit(s) SubCutaneous at bedtime  insulin lispro (ADMELOG) corrective regimen sliding scale   SubCutaneous three times a day before meals  insulin lispro (ADMELOG) corrective regimen sliding scale   SubCutaneous at bedtime  insulin lispro Injectable (ADMELOG) 15 Unit(s) SubCutaneous at bedtime  insulin lispro Injectable (ADMELOG) 28 Unit(s) SubCutaneous three times a day with meals  ketotifen 0.025% Ophthalmic Solution 1 Drop(s) Both EYES two times a day  loratadine 10 milliGRAM(s) Oral daily  nystatin Powder 1 Application(s) Topical three times a day  oxyCODONE  ER Tablet 60 milliGRAM(s) Oral every 12 hours  polyethylene glycol 3350 17 Gram(s) Oral every 12 hours  remdesivir  IVPB 100 milliGRAM(s) IV Intermittent every 24 hours  remdesivir  IVPB   IV Intermittent     MEDICATIONS  (PRN):  acetaminophen     Tablet .. 650 milliGRAM(s) Oral every 6 hours PRN Temp greater or equal to 38C (100.4F), Mild Pain (1 - 3)  ALPRAZolam 0.25 milliGRAM(s) Oral once PRN premedication  benzocaine 15 mG/menthol 3.6 mG Lozenge 1 Lozenge Oral daily PRN Sore Throat  dextrose Oral Gel 15 Gram(s) Oral once PRN Blood Glucose LESS THAN 70 milliGRAM(s)/deciliter  lactulose Syrup 20 Gram(s) Oral daily PRN constipation  ondansetron    Tablet 4 milliGRAM(s) Oral every 8 hours PRN Nausea and/or Vomiting  oxyCODONE    IR 10 milliGRAM(s) Oral every 4 hours PRN Severe Pain (7 - 10)      Allergies    adhesives (Rash)  Bactrim (Flushing)  latex (Rash)  wool- rash, itch (Other)    Intolerances      Review of Systems:  Constitutional: +chills and fever, No change in weight  Eyes: No blurry vision  Neuro: No headache, +neuropathy  HEENT: No throat pain  Cardiovascular: No chest pain  Respiratory: No SOB  GI: No nausea or vomiting  : No polyuria  Skin: no rash  Psych: no depression  Endocrine: No polydipsia, No heat or cold intolerance, rest as noted in HPI  Hem/lymph: no swelling    All other review of systems negative      PHYSICAL EXAM:  VITALS: T(C): 36.6 (10-23-22 @ 12:10)  T(F): 97.8 (10-23-22 @ 12:10), Max: 97.9 (10-22-22 @ 22:00)  HR: 63 (10-23-22 @ 12:10) (63 - 84)  BP: 136/66 (10-23-22 @ 12:10) (130/82 - 136/66)  RR:  (19 - 20)  SpO2:  (96% - 99%)  Wt(kg): --  GENERAL: NAD at this time, morbidly obese  EYES: No proptosis, EOMI  HEENT:  Atraumatic, Normocephalic,   THYROID: Normal size, no palpable nodules  RESPIRATORY: full excursion, non-labored  CARDIOVASCULAR: Regular rhythm; No murmurs; no peripheral edema  GI: Soft, nontender, non distended, normal bowel sounds  SKIN: Dry, intact, +LE venous stasis dermatitis  MUSCULOSKELETAL: normal strength  NEURO: follows commands  PSYCH: Alert and oriented x 3, normal affect, normal mood  CUSHING'S SIGNS: no striae      POCT Blood Glucose.: 427 mg/dL (10-23-22 @ 16:09)  POCT Blood Glucose.: 413 mg/dL (10-23-22 @ 11:54)  POCT Blood Glucose.: 408 mg/dL (10-23-22 @ 07:42)  POCT Blood Glucose.: 438 mg/dL (10-23-22 @ 04:36)  POCT Blood Glucose.: 475 mg/dL (10-22-22 @ 23:49)  POCT Blood Glucose.: 485 mg/dL (10-22-22 @ 23:48)  POCT Blood Glucose.: 509 mg/dL (10-22-22 @ 22:00)  POCT Blood Glucose.: 482 mg/dL (10-22-22 @ 21:59)  POCT Blood Glucose.: 500 mg/dL (10-22-22 @ 16:46)  POCT Blood Glucose.: 465 mg/dL (10-22-22 @ 16:44)  POCT Blood Glucose.: 468 mg/dL (10-22-22 @ 15:21)  POCT Blood Glucose.: 432 mg/dL (10-22-22 @ 11:51)  POCT Blood Glucose.: 375 mg/dL (10-22-22 @ 07:50)  POCT Blood Glucose.: 394 mg/dL (10-21-22 @ 22:23)  POCT Blood Glucose.: 319 mg/dL (10-21-22 @ 16:50)  POCT Blood Glucose.: 276 mg/dL (10-21-22 @ 11:17)  POCT Blood Glucose.: 236 mg/dL (10-21-22 @ 08:04)  POCT Blood Glucose.: 307 mg/dL (10-20-22 @ 23:38)  POCT Blood Glucose.: 329 mg/dL (10-20-22 @ 21:06)                              13.2   6.53  )-----------( 242      ( 23 Oct 2022 06:00 )             43.6       10-23    134<L>  |  91<L>  |  57<H>  ----------------------------<  466<HH>  4.6   |  29  |  1.76<H>    eGFR: 31<L>    Ca    9.8      10-23  Mg     2.2     10-23  Phos  2.9     10-23    TPro  7.4  /  Alb  3.7  /  TBili  0.7  /  DBili  x   /  AST  39  /  ALT  34  /  AlkPhos  150<H>  10-22    Thyroid Function Tests:            Radiology:

## 2022-10-23 NOTE — PROVIDER CONTACT NOTE (CRITICAL VALUE NOTIFICATION) - ACTION/TREATMENT ORDERED:
[facility] at 0400 and f/u with provider
As per Alejandro Mckeon (Team 2) will continue to monitor.
Team MD aware. Will re-swab pt for Covid and place on Isolation.

## 2022-10-23 NOTE — PROVIDER CONTACT NOTE (CRITICAL VALUE NOTIFICATION) - ASSESSMENT
asymptomatic other than polyuria asymptomatic other than polyuria; patient is also on furosemide though

## 2022-10-23 NOTE — PROVIDER CONTACT NOTE (CRITICAL VALUE NOTIFICATION) - BACKGROUND
Pt FSBG running high during the day and into night shift as well
Dx: Heart Failure  Hx: CKD, DM
Pt admitted for HF. PMH of gait difficulty, CKD, asthma, morbid obesity, DVT, dyslipidemia, DM type II, etc.

## 2022-10-23 NOTE — CONSULT NOTE ADULT - ASSESSMENT
66 y/o F w/ uncontrolled Type 2 DM with hyperglycemia exacerbated by steroids complicated by neuropathy and nephropathy, HTN, HLD admitted with COVID (high risk patient with severe hyperglycemia with glucose values > 400's at high risk of CAD and CVA with high level decision-making).

## 2022-10-23 NOTE — PROGRESS NOTE ADULT - PROBLEM SELECTOR PLAN 3
On Toujeo 80 units AM, 84 units PM + Novolog 20 qAC at home. Hyperglycemic today to 500+, likely exacerbated by dexamethasone started 10/21. Curbsided endocrinology fellow, greatly appreciate recs:  - Increase lantus qHS from 42->45U--? 50   - Increase premeal insulin from 4->12U-> 15, will titrate back down when off dex  - Moderate dose correctional scale  - will consult endocrine On Toujeo 80 units AM, 84 units PM + Novolog 20 qAC at home. Hyperglycemic today to 500+, likely exacerbated by dexamethasone started 10/21. Curbsided endocrinology fellow, greatly appreciate recs:  - Increase lantus qHS from 42->45U--> 50   - Increase premeal insulin from 4->12U-> 15, will titrate back down when off dex  - Moderate dose correctional scale  - will consult endocrine

## 2022-10-23 NOTE — PROGRESS NOTE ADULT - PROBLEM SELECTOR PLAN 1
Pt w/ dyspnea despite supplemental O2, on 5L NC (home 3-4L NC, uses daily nebulizer). Noted to be anasarcic by family prior to admission. Symptoms improved since admission but now stable. Likely 2/2 HF exacerbation given hx, now complicated by COVID+ as below. Duplex w/o evidence of DVT and d-dimer 339, low suspicion for PE.  - TTE 10/19 w/ EF 55-60%, limited findings due to habitus, unable to discern pHTN (previous noted at [facility] on 11/20)     - appreciate cardiology recs: no RHC iso other issues, consider pulm consult, BIPAP/AVAPS, weight loss     - pCO2 improved today     -  trial AVAPS again tonight with low-dose xanax (0.25) premedication- however patient did not tolerate   - continue lasix 40mg IV BID for diuresis until pH becomes alklalotic, in which case will down-titrate to 40IV daily   - continue duonebs, consdering discontinuing dexamethasone, given patient not more hypoxic than baseline and is causing hyperglycemia   - continue symbicort (therapeutic interchange for Advair)  - Daily bed weights, strict I/O, fluid restrict  - Cards consulted for whether patient would benefit from RHC, recommended SGLT2-i Pt w/ dyspnea despite supplemental O2, on 5L NC (home 3-4L NC, uses daily nebulizer). Noted to be anasarcic by family prior to admission. Symptoms improved since admission but now stable. Likely 2/2 HF exacerbation given hx, now complicated by COVID+ as below. Duplex w/o evidence of DVT and d-dimer 339, low suspicion for PE.  - TTE 10/19 w/ EF 55-60%, limited findings due to habitus, unable to discern pHTN (previous noted at [facility] on 11/20)     - appreciate cardiology recs: no RHC iso other issues, consider pulm consult, BIPAP/AVAPS, weight loss     - pCO2 improved today     -  trial AVAPS again tonight with low-dose xanax (0.25) premedication- however patient did not tolerate   - continue lasix 40mg IV BID for diuresis until pH becomes alklalotic, in which case will down-titrate to 40IV daily   - continue duonebs, consdering discontinuing dexamethasone, given patient not more hypoxic than baseline and is causing hyperglycemia   - continue symbicort (therapeutic interchange for Advair)  - Daily bed weights, strict I/O, fluid restrict  - Cards consulted for whether patient would benefit from RHC, recommended SGLT2-i  - endorses clinical improvement, consider decreasing to 40 IV lasix daily on Monday

## 2022-10-24 LAB
ANION GAP SERPL CALC-SCNC: 14 MMOL/L — SIGNIFICANT CHANGE UP (ref 5–17)
BASE EXCESS BLDV CALC-SCNC: 14.5 MMOL/L — HIGH (ref -2–3)
BUN SERPL-MCNC: 60 MG/DL — HIGH (ref 7–23)
CALCIUM SERPL-MCNC: 9.8 MG/DL — SIGNIFICANT CHANGE UP (ref 8.4–10.5)
CHLORIDE SERPL-SCNC: 89 MMOL/L — LOW (ref 96–108)
CO2 BLDV-SCNC: 42 MMOL/L — HIGH (ref 22–26)
CO2 SERPL-SCNC: 33 MMOL/L — HIGH (ref 22–31)
CREAT SERPL-MCNC: 1.61 MG/DL — HIGH (ref 0.5–1.3)
EGFR: 35 ML/MIN/1.73M2 — LOW
GAS PNL BLDV: SIGNIFICANT CHANGE UP
GLUCOSE BLDC GLUCOMTR-MCNC: 280 MG/DL — HIGH (ref 70–99)
GLUCOSE BLDC GLUCOMTR-MCNC: 328 MG/DL — HIGH (ref 70–99)
GLUCOSE BLDC GLUCOMTR-MCNC: 339 MG/DL — HIGH (ref 70–99)
GLUCOSE BLDC GLUCOMTR-MCNC: 406 MG/DL — HIGH (ref 70–99)
GLUCOSE SERPL-MCNC: 444 MG/DL — HIGH (ref 70–99)
HCO3 BLDV-SCNC: 41 MMOL/L — HIGH (ref 22–29)
HCT VFR BLD CALC: 42.6 % — SIGNIFICANT CHANGE UP (ref 34.5–45)
HGB BLD-MCNC: 13.3 G/DL — SIGNIFICANT CHANGE UP (ref 11.5–15.5)
MAGNESIUM SERPL-MCNC: 2.1 MG/DL — SIGNIFICANT CHANGE UP (ref 1.6–2.6)
MCHC RBC-ENTMCNC: 27.1 PG — SIGNIFICANT CHANGE UP (ref 27–34)
MCHC RBC-ENTMCNC: 31.2 GM/DL — LOW (ref 32–36)
MCV RBC AUTO: 86.9 FL — SIGNIFICANT CHANGE UP (ref 80–100)
NRBC # BLD: 0 /100 WBCS — SIGNIFICANT CHANGE UP (ref 0–0)
PCO2 BLDV: 56 MMHG — HIGH (ref 39–42)
PH BLDV: 7.47 — HIGH (ref 7.32–7.43)
PHOSPHATE SERPL-MCNC: 3.7 MG/DL — SIGNIFICANT CHANGE UP (ref 2.5–4.5)
PLATELET # BLD AUTO: 236 K/UL — SIGNIFICANT CHANGE UP (ref 150–400)
PO2 BLDV: 57 MMHG — HIGH (ref 25–45)
POTASSIUM SERPL-MCNC: 4.4 MMOL/L — SIGNIFICANT CHANGE UP (ref 3.5–5.3)
POTASSIUM SERPL-SCNC: 4.4 MMOL/L — SIGNIFICANT CHANGE UP (ref 3.5–5.3)
RBC # BLD: 4.9 M/UL — SIGNIFICANT CHANGE UP (ref 3.8–5.2)
RBC # FLD: 14.3 % — SIGNIFICANT CHANGE UP (ref 10.3–14.5)
SAO2 % BLDV: 87.6 % — SIGNIFICANT CHANGE UP (ref 67–88)
SODIUM SERPL-SCNC: 136 MMOL/L — SIGNIFICANT CHANGE UP (ref 135–145)
WBC # BLD: 8.79 K/UL — SIGNIFICANT CHANGE UP (ref 3.8–10.5)
WBC # FLD AUTO: 8.79 K/UL — SIGNIFICANT CHANGE UP (ref 3.8–10.5)

## 2022-10-24 PROCEDURE — 99232 SBSQ HOSP IP/OBS MODERATE 35: CPT | Mod: GC

## 2022-10-24 PROCEDURE — 99232 SBSQ HOSP IP/OBS MODERATE 35: CPT

## 2022-10-24 RX ORDER — INSULIN GLARGINE 100 [IU]/ML
20 INJECTION, SOLUTION SUBCUTANEOUS EVERY MORNING
Refills: 0 | Status: DISCONTINUED | OUTPATIENT
Start: 2022-10-24 | End: 2022-10-26

## 2022-10-24 RX ORDER — INSULIN LISPRO 100/ML
VIAL (ML) SUBCUTANEOUS
Refills: 0 | Status: DISCONTINUED | OUTPATIENT
Start: 2022-10-24 | End: 2022-10-26

## 2022-10-24 RX ORDER — FUROSEMIDE 40 MG
40 TABLET ORAL DAILY
Refills: 0 | Status: DISCONTINUED | OUTPATIENT
Start: 2022-10-25 | End: 2022-10-25

## 2022-10-24 RX ORDER — INSULIN LISPRO 100/ML
40 VIAL (ML) SUBCUTANEOUS
Refills: 0 | Status: DISCONTINUED | OUTPATIENT
Start: 2022-10-24 | End: 2022-10-26

## 2022-10-24 RX ORDER — INSULIN LISPRO 100/ML
36 VIAL (ML) SUBCUTANEOUS
Refills: 0 | Status: DISCONTINUED | OUTPATIENT
Start: 2022-10-24 | End: 2022-10-24

## 2022-10-24 RX ADMIN — NYSTATIN CREAM 1 APPLICATION(S): 100000 CREAM TOPICAL at 13:12

## 2022-10-24 RX ADMIN — Medication 1 APPLICATION(S): at 18:02

## 2022-10-24 RX ADMIN — LORATADINE 10 MILLIGRAM(S): 10 TABLET ORAL at 13:12

## 2022-10-24 RX ADMIN — INSULIN GLARGINE 20 UNIT(S): 100 INJECTION, SOLUTION SUBCUTANEOUS at 12:37

## 2022-10-24 RX ADMIN — POLYETHYLENE GLYCOL 3350 17 GRAM(S): 17 POWDER, FOR SOLUTION ORAL at 18:03

## 2022-10-24 RX ADMIN — Medication 2000 UNIT(S): at 13:28

## 2022-10-24 RX ADMIN — Medication 6 MILLIGRAM(S): at 05:43

## 2022-10-24 RX ADMIN — Medication 36 UNIT(S): at 12:12

## 2022-10-24 RX ADMIN — OXYCODONE HYDROCHLORIDE 10 MILLIGRAM(S): 5 TABLET ORAL at 22:32

## 2022-10-24 RX ADMIN — Medication 40 MILLIGRAM(S): at 05:44

## 2022-10-24 RX ADMIN — Medication 8: at 12:12

## 2022-10-24 RX ADMIN — Medication 81 MILLIGRAM(S): at 13:28

## 2022-10-24 RX ADMIN — ATORVASTATIN CALCIUM 80 MILLIGRAM(S): 80 TABLET, FILM COATED ORAL at 22:33

## 2022-10-24 RX ADMIN — OXYCODONE HYDROCHLORIDE 10 MILLIGRAM(S): 5 TABLET ORAL at 12:37

## 2022-10-24 RX ADMIN — Medication 3 MILLILITER(S): at 13:29

## 2022-10-24 RX ADMIN — HEPARIN SODIUM 5000 UNIT(S): 5000 INJECTION INTRAVENOUS; SUBCUTANEOUS at 05:45

## 2022-10-24 RX ADMIN — Medication 3 MILLILITER(S): at 05:43

## 2022-10-24 RX ADMIN — BUDESONIDE AND FORMOTEROL FUMARATE DIHYDRATE 2 PUFF(S): 160; 4.5 AEROSOL RESPIRATORY (INHALATION) at 05:44

## 2022-10-24 RX ADMIN — Medication 2: at 22:33

## 2022-10-24 RX ADMIN — HEPARIN SODIUM 5000 UNIT(S): 5000 INJECTION INTRAVENOUS; SUBCUTANEOUS at 13:28

## 2022-10-24 RX ADMIN — OXYCODONE HYDROCHLORIDE 10 MILLIGRAM(S): 5 TABLET ORAL at 23:43

## 2022-10-24 RX ADMIN — Medication 15 UNIT(S): at 22:33

## 2022-10-24 RX ADMIN — OXYCODONE HYDROCHLORIDE 60 MILLIGRAM(S): 5 TABLET ORAL at 18:03

## 2022-10-24 RX ADMIN — ONDANSETRON 4 MILLIGRAM(S): 8 TABLET, FILM COATED ORAL at 23:33

## 2022-10-24 RX ADMIN — NYSTATIN CREAM 1 APPLICATION(S): 100000 CREAM TOPICAL at 05:44

## 2022-10-24 RX ADMIN — POLYETHYLENE GLYCOL 3350 17 GRAM(S): 17 POWDER, FOR SOLUTION ORAL at 05:44

## 2022-10-24 RX ADMIN — HEPARIN SODIUM 5000 UNIT(S): 5000 INJECTION INTRAVENOUS; SUBCUTANEOUS at 22:33

## 2022-10-24 RX ADMIN — CHLORHEXIDINE GLUCONATE 1 APPLICATION(S): 213 SOLUTION TOPICAL at 05:45

## 2022-10-24 RX ADMIN — Medication 3 MILLILITER(S): at 23:33

## 2022-10-24 RX ADMIN — Medication 8: at 16:49

## 2022-10-24 RX ADMIN — OXYCODONE HYDROCHLORIDE 60 MILLIGRAM(S): 5 TABLET ORAL at 05:45

## 2022-10-24 RX ADMIN — Medication 8: at 08:27

## 2022-10-24 RX ADMIN — Medication 28 UNIT(S): at 08:27

## 2022-10-24 RX ADMIN — Medication 40 UNIT(S): at 18:03

## 2022-10-24 RX ADMIN — KETOTIFEN FUMARATE 1 DROP(S): 0.34 SOLUTION OPHTHALMIC at 05:44

## 2022-10-24 RX ADMIN — REMDESIVIR 500 MILLIGRAM(S): 5 INJECTION INTRAVENOUS at 18:03

## 2022-10-24 RX ADMIN — OXYCODONE HYDROCHLORIDE 60 MILLIGRAM(S): 5 TABLET ORAL at 06:15

## 2022-10-24 RX ADMIN — KETOTIFEN FUMARATE 1 DROP(S): 0.34 SOLUTION OPHTHALMIC at 18:03

## 2022-10-24 RX ADMIN — INSULIN GLARGINE 60 UNIT(S): 100 INJECTION, SOLUTION SUBCUTANEOUS at 22:32

## 2022-10-24 NOTE — PROGRESS NOTE ADULT - PROBLEM SELECTOR PLAN 3
On Toujeo 80 units AM, 84 units PM + Novolog 20 qAC at home. Hyperglycemic today to 500+, likely exacerbated by dexamethasone started 10/21. Curbsided endocrinology fellow, greatly appreciate recs:  - Increase lantus qHS from 42->45U--> 50   - Increase premeal insulin from 4->12U-> 15, will titrate back down when off dex  - Moderate dose correctional scale  - will consult endocrine On Toujeo 80 units AM, 84 units PM + Novolog 20 qAC at home.   Hyperglycemic to 500+ likely exacerbated by dexamethasone started 10/21.   - Endo consulted. Recs for 20 U Lantus AM, 60 U Lantus at bedtime, Admelog 36 U premeal, Admelog 15 U at bedtime.   - Increased lantus qHS from 42->45U--> 50 -> 60  - Increased premeal insulin from 4->12U-> 15 -> 36, will titrate back down as pt now off dex  - Moderate dose correctional scale  - Endo recs appreciated

## 2022-10-24 NOTE — PROGRESS NOTE ADULT - ATTENDING COMMENTS
66F with PMH morbid obesity, moderate persistent asthma, HTN, HLD, DM, CKD3, HFpEF on 4-6L NC oxygen at baseline (not on BiPAP/CPAP qhs), presenting with SOB/LEGER most consistent w/ heart failure exacerbation now improving with Lasix 40IV bid, course c.b COVID on Remdesevir and Dexamethasone, and hyperglycemia iso Dexa tx.     #Acute on chronic hypoxic respiratory failure  #COVID PNA  #Hyperglycemia/DM2  #CKD3  #HFpEF  #pHTN  - patient continues to be persistently hyperglycemic  - fingersticks exacerbated by dexamethasone  - back to baseline respiratory status 4-5L NC  - on diuresis with lasix, decreased to 40 IV daily dosing given metabolic alkalosis  - cardiology recs appreciated, will consider starting SGTL2 inhibitor but my concern is that patient is mostly bedbound, and may have increased risk of infection  - c/w CPAP/BIPAP at night to assist with OHS

## 2022-10-24 NOTE — PROGRESS NOTE ADULT - PROBLEM SELECTOR PLAN 1
Pt w/ dyspnea despite supplemental O2, on 5L NC (home 3-4L NC, uses daily nebulizer). Noted to be anasarcic by family prior to admission. Symptoms improved since admission but now stable. Likely 2/2 HF exacerbation given hx, now complicated by COVID+ as below. Duplex w/o evidence of DVT and d-dimer 339, low suspicion for PE.  - TTE 10/19 w/ EF 55-60%, limited findings due to habitus, unable to discern pHTN (previous noted at [facility] on 11/20)     - appreciate cardiology recs: no RHC iso other issues, consider pulm consult, BIPAP/AVAPS, weight loss     - pCO2 improved today     -  trial AVAPS again tonight with low-dose xanax (0.25) premedication- however patient did not tolerate   - continue lasix 40mg IV BID for diuresis until pH becomes alklalotic, in which case will down-titrate to 40IV daily   - continue duonebs, consdering discontinuing dexamethasone, given patient not more hypoxic than baseline and is causing hyperglycemia   - continue symbicort (therapeutic interchange for Advair)  - Daily bed weights, strict I/O, fluid restrict  - Cards consulted for whether patient would benefit from RHC, recommended SGLT2-i  - endorses clinical improvement, consider decreasing to 40 IV lasix daily on Monday Pt w/ dyspnea despite supplemental O2, on 5L NC (home 3-4L NC, uses daily nebulizer). Noted to be anasarcic by family prior to admission. Symptoms improved since admission but now stable. Likely 2/2 HF exacerbation given hx, now complicated by COVID+ as below. Duplex w/o evidence of DVT and d-dimer 339, low suspicion for PE.  - TTE 10/19 w/ EF 55-60%, limited findings due to habitus, unable to discern pHTN (previous noted at [facility] on 11/20)     - appreciate cardiology recs: no RHC iso other issues, consider pulm consult, BIPAP/AVAPS, weight loss     - pCO2 improved today     -  trial AVAPS again tonight with low-dose xanax (0.25) premedication- however patient did not tolerate   - down-titrate lasix to 40IV daily today  - continue duonebs, consdering discontinuing dexamethasone, given patient not more hypoxic than baseline and is causing hyperglycemia   - continue symbicort (therapeutic interchange for Advair)  - Daily bed weights, strict I/O, fluid restrict  - Cards consulted for whether patient would benefit from RHC, recommended SGLT2-i  - endorses clinical improvement, consider decreasing to 40 IV lasix daily on Monday

## 2022-10-24 NOTE — PROGRESS NOTE ADULT - PROBLEM SELECTOR PLAN 10
DVT PPx: SubQ heparin  Diet: DASH/CC  GI PPx: Miralax + lactulose PRN (per pt pref)  Dispo: Pending medical course  GOC: Patient states she has advanced directives/will at home. DNR with trial of intubation. Patient's son to bring in paperwork from home DVT PPx: SubQ heparin  Diet: DASH/CC  GI PPx: Miralax + lactulose PRN (per pt pref)  Dispo: PT recommended [facility] but patient refusing; plan for d/c home with needs.   GOC: Patient states she has advanced directives/will at home. DNR with trial of intubation. Patient's son to bring in paperwork from home

## 2022-10-24 NOTE — PROGRESS NOTE ADULT - SUBJECTIVE AND OBJECTIVE BOX
PROGRESS NOTE:   Authored by Dr. Opal Coker    Patient is a 67y old  Female who presents with a chief complaint of HF exacerbation (23 Oct 2022 20:20)      SUBJECTIVE / OVERNIGHT EVENTS:    ADDITIONAL REVIEW OF SYSTEMS:    MEDICATIONS  (STANDING):  albuterol/ipratropium for Nebulization 3 milliLiter(s) Nebulizer every 6 hours  ammonium lactate 12% Lotion 1 Application(s) Topical every 12 hours  aspirin enteric coated 81 milliGRAM(s) Oral daily  atorvastatin 80 milliGRAM(s) Oral at bedtime  budesonide 160 MICROgram(s)/formoterol 4.5 MICROgram(s) Inhaler 2 Puff(s) Inhalation two times a day  chlorhexidine 2% Cloths 1 Application(s) Topical <User Schedule>  cholecalciferol 2000 Unit(s) Oral daily  dexAMETHasone  Injectable 6 milliGRAM(s) IV Push daily  dextrose 5%. 1000 milliLiter(s) (100 mL/Hr) IV Continuous <Continuous>  dextrose 5%. 1000 milliLiter(s) (50 mL/Hr) IV Continuous <Continuous>  dextrose 50% Injectable 25 Gram(s) IV Push once  dextrose 50% Injectable 12.5 Gram(s) IV Push once  dextrose 50% Injectable 25 Gram(s) IV Push once  furosemide   Injectable 40 milliGRAM(s) IV Push every 12 hours  glucagon  Injectable 1 milliGRAM(s) IntraMuscular once  heparin   Injectable 5000 Unit(s) SubCutaneous every 8 hours  insulin glargine Injectable (LANTUS) 60 Unit(s) SubCutaneous at bedtime  insulin lispro (ADMELOG) corrective regimen sliding scale   SubCutaneous three times a day before meals  insulin lispro (ADMELOG) corrective regimen sliding scale   SubCutaneous at bedtime  insulin lispro Injectable (ADMELOG) 28 Unit(s) SubCutaneous three times a day with meals  insulin lispro Injectable (ADMELOG) 15 Unit(s) SubCutaneous at bedtime  ketotifen 0.025% Ophthalmic Solution 1 Drop(s) Both EYES two times a day  loratadine 10 milliGRAM(s) Oral daily  nystatin Powder 1 Application(s) Topical three times a day  oxyCODONE  ER Tablet 60 milliGRAM(s) Oral every 12 hours  polyethylene glycol 3350 17 Gram(s) Oral every 12 hours  remdesivir  IVPB 100 milliGRAM(s) IV Intermittent every 24 hours  remdesivir  IVPB   IV Intermittent     MEDICATIONS  (PRN):  acetaminophen     Tablet .. 650 milliGRAM(s) Oral every 6 hours PRN Temp greater or equal to 38C (100.4F), Mild Pain (1 - 3)  ALPRAZolam 0.25 milliGRAM(s) Oral once PRN premedication  benzocaine 15 mG/menthol 3.6 mG Lozenge 1 Lozenge Oral daily PRN Sore Throat  dextrose Oral Gel 15 Gram(s) Oral once PRN Blood Glucose LESS THAN 70 milliGRAM(s)/deciliter  lactulose Syrup 20 Gram(s) Oral daily PRN constipation  ondansetron    Tablet 4 milliGRAM(s) Oral every 8 hours PRN Nausea and/or Vomiting  oxyCODONE    IR 10 milliGRAM(s) Oral every 4 hours PRN Severe Pain (7 - 10)      CAPILLARY BLOOD GLUCOSE      POCT Blood Glucose.: 385 mg/dL (23 Oct 2022 21:31)  POCT Blood Glucose.: 427 mg/dL (23 Oct 2022 16:09)  POCT Blood Glucose.: 413 mg/dL (23 Oct 2022 11:54)  POCT Blood Glucose.: 408 mg/dL (23 Oct 2022 07:42)    I&O's Summary    23 Oct 2022 07:01  -  24 Oct 2022 07:00  --------------------------------------------------------  IN: 480 mL / OUT: 2600 mL / NET: -2120 mL        PHYSICAL EXAM:  Vital Signs Last 24 Hrs  T(C): 36.5 (24 Oct 2022 04:16), Max: 36.6 (23 Oct 2022 12:10)  T(F): 97.7 (24 Oct 2022 04:16), Max: 97.8 (23 Oct 2022 12:10)  HR: 61 (24 Oct 2022 04:16) (61 - 70)  BP: 137/75 (24 Oct 2022 04:16) (127/78 - 137/75)  BP(mean): --  RR: 18 (24 Oct 2022 04:16) (18 - 19)  SpO2: 100% (24 Oct 2022 04:16) (99% - 100%)    Parameters below as of 24 Oct 2022 04:16  Patient On (Oxygen Delivery Method): nasal cannula  O2 Flow (L/min): 5      GENERAL: NAD, lying comfortably in bed  HEAD: Atraumatic, normocephalic  EYES: EOMI b/l, conjunctiva and sclera clear  NECK: Supple, No JVD, No LAD  RESPIRATORY: Normal respiratory effort; lungs are clear to auscultation bilaterally  CARDIOVASCULAR: Regular rate and rhythm, normal S1 and S2, no murmur/rub/gallop; No lower extremity edema  ABDOMEN: Nontender, normoactive bowel sounds, no rebound/guarding; No hepatosplenomegaly  MUSCULOSKELETAL: no clubbing or cyanosis of digits; no joint swelling or tenderness to palpation  NEURO: Non focal   SKIN:   PSYCH: A+O to person, place, and time; affect appropriate    LABS:                          13.2   6.53  )-----------( 242      ( 23 Oct 2022 06:00 )             43.6     10-23    134<L>  |  91<L>  |  57<H>  ----------------------------<  466<HH>  4.6   |  29  |  1.76<H>    Ca    9.8      23 Oct 2022 06:00  Phos  2.9     10-23  Mg     2.2     10-23    TPro  7.4  /  Alb  3.7  /  TBili  0.7  /  DBili  x   /  AST  39  /  ALT  34  /  AlkPhos  150<H>  10-22                  RADIOLOGY:    Consulted note reviewed  Reviewed Imaging personally   PROGRESS NOTE:   Authored by Dr. Opal Coker    Patient is a 67y old  Female who presents with a chief complaint of HF exacerbation (23 Oct 2022 20:20)      SUBJECTIVE / OVERNIGHT EVENTS: no acute events overnight. Pt continues to endorse SOB, mild wheezig, and coughing though less frequent than yesterday. Says she has CP b/l that is worse with coughing and activity. But no left sided CP or radiation. Pt additionally c/o pain around the wound site below her abdomen for her interigo. Says she had some peeling of skin and would like a wound reconsult.     ADDITIONAL REVIEW OF SYSTEMS:  Denies abdominal pain, N/V, peripheral edema.       MEDICATIONS  (STANDING):  albuterol/ipratropium for Nebulization 3 milliLiter(s) Nebulizer every 6 hours  ammonium lactate 12% Lotion 1 Application(s) Topical every 12 hours  aspirin enteric coated 81 milliGRAM(s) Oral daily  atorvastatin 80 milliGRAM(s) Oral at bedtime  budesonide 160 MICROgram(s)/formoterol 4.5 MICROgram(s) Inhaler 2 Puff(s) Inhalation two times a day  chlorhexidine 2% Cloths 1 Application(s) Topical <User Schedule>  cholecalciferol 2000 Unit(s) Oral daily  dexAMETHasone  Injectable 6 milliGRAM(s) IV Push daily  dextrose 5%. 1000 milliLiter(s) (100 mL/Hr) IV Continuous <Continuous>  dextrose 5%. 1000 milliLiter(s) (50 mL/Hr) IV Continuous <Continuous>  dextrose 50% Injectable 25 Gram(s) IV Push once  dextrose 50% Injectable 12.5 Gram(s) IV Push once  dextrose 50% Injectable 25 Gram(s) IV Push once  furosemide   Injectable 40 milliGRAM(s) IV Push every 12 hours  glucagon  Injectable 1 milliGRAM(s) IntraMuscular once  heparin   Injectable 5000 Unit(s) SubCutaneous every 8 hours  insulin glargine Injectable (LANTUS) 60 Unit(s) SubCutaneous at bedtime  insulin lispro (ADMELOG) corrective regimen sliding scale   SubCutaneous three times a day before meals  insulin lispro (ADMELOG) corrective regimen sliding scale   SubCutaneous at bedtime  insulin lispro Injectable (ADMELOG) 28 Unit(s) SubCutaneous three times a day with meals  insulin lispro Injectable (ADMELOG) 15 Unit(s) SubCutaneous at bedtime  ketotifen 0.025% Ophthalmic Solution 1 Drop(s) Both EYES two times a day  loratadine 10 milliGRAM(s) Oral daily  nystatin Powder 1 Application(s) Topical three times a day  oxyCODONE  ER Tablet 60 milliGRAM(s) Oral every 12 hours  polyethylene glycol 3350 17 Gram(s) Oral every 12 hours  remdesivir  IVPB 100 milliGRAM(s) IV Intermittent every 24 hours  remdesivir  IVPB   IV Intermittent     MEDICATIONS  (PRN):  acetaminophen     Tablet .. 650 milliGRAM(s) Oral every 6 hours PRN Temp greater or equal to 38C (100.4F), Mild Pain (1 - 3)  ALPRAZolam 0.25 milliGRAM(s) Oral once PRN premedication  benzocaine 15 mG/menthol 3.6 mG Lozenge 1 Lozenge Oral daily PRN Sore Throat  dextrose Oral Gel 15 Gram(s) Oral once PRN Blood Glucose LESS THAN 70 milliGRAM(s)/deciliter  lactulose Syrup 20 Gram(s) Oral daily PRN constipation  ondansetron    Tablet 4 milliGRAM(s) Oral every 8 hours PRN Nausea and/or Vomiting  oxyCODONE    IR 10 milliGRAM(s) Oral every 4 hours PRN Severe Pain (7 - 10)      CAPILLARY BLOOD GLUCOSE      POCT Blood Glucose.: 385 mg/dL (23 Oct 2022 21:31)  POCT Blood Glucose.: 427 mg/dL (23 Oct 2022 16:09)  POCT Blood Glucose.: 413 mg/dL (23 Oct 2022 11:54)  POCT Blood Glucose.: 408 mg/dL (23 Oct 2022 07:42)    I&O's Summary    23 Oct 2022 07:01  -  24 Oct 2022 07:00  --------------------------------------------------------  IN: 480 mL / OUT: 2600 mL / NET: -2120 mL        PHYSICAL EXAM:  Vital Signs Last 24 Hrs  T(C): 36.5 (24 Oct 2022 04:16), Max: 36.6 (23 Oct 2022 12:10)  T(F): 97.7 (24 Oct 2022 04:16), Max: 97.8 (23 Oct 2022 12:10)  HR: 61 (24 Oct 2022 04:16) (61 - 70)  BP: 137/75 (24 Oct 2022 04:16) (127/78 - 137/75)  BP(mean): --  RR: 18 (24 Oct 2022 04:16) (18 - 19)  SpO2: 100% (24 Oct 2022 04:16) (99% - 100%)    Parameters below as of 24 Oct 2022 04:16  Patient On (Oxygen Delivery Method): nasal cannula  O2 Flow (L/min): 5      GENERAL: morbidly obese female, NAD, lying comfortably in bed  HEAD: Atraumatic, normocephalic  EYES: EOMI b/l, conjunctiva and sclera clear  NECK: Supple  RESPIRATORY: Normal respiratory effort; speaking in full sentences; limited anterior lung exam; clear to auscultation without wheezing on lateral fields.   CARDIOVASCULAR: Regular rate and rhythm, normal S1 and S2, no murmur/rub/gallop; No lower extremity edema  ABDOMEN: Nontender, normoactive bowel sounds, no rebound/guarding; No hepatosplenomegaly  MUSCULOSKELETAL: no clubbing or cyanosis of digits; no joint swelling or tenderness to palpation  NEURO: Non focal   SKIN:   PSYCH: A+O to person, place, and time; affect appropriate    LABS:                          13.2   6.53  )-----------( 242      ( 23 Oct 2022 06:00 )             43.6     10-23    134<L>  |  91<L>  |  57<H>  ----------------------------<  466<HH>  4.6   |  29  |  1.76<H>    Ca    9.8      23 Oct 2022 06:00  Phos  2.9     10-23  Mg     2.2     10-23    TPro  7.4  /  Alb  3.7  /  TBili  0.7  /  DBili  x   /  AST  39  /  ALT  34  /  AlkPhos  150<H>  10-22                  RADIOLOGY:    Consulted note reviewed  Reviewed Imaging personally   PROGRESS NOTE:   Authored by Dr. Opal Coker    Patient is a 67y old  Female who presents with a chief complaint of HF exacerbation (23 Oct 2022 20:20)      SUBJECTIVE / OVERNIGHT EVENTS: no acute events overnight. Pt continues to endorse SOB, mild wheezig, and coughing though less frequent than yesterday. Says she has CP b/l that is worse with coughing and activity. But no left sided CP or radiation. Pt additionally c/o pain around the wound site below her abdomen for her interigo. Says she had some peeling of skin and would like a wound reconsult.     ADDITIONAL REVIEW OF SYSTEMS:  Denies abdominal pain, N/V, peripheral edema.       MEDICATIONS  (STANDING):  albuterol/ipratropium for Nebulization 3 milliLiter(s) Nebulizer every 6 hours  ammonium lactate 12% Lotion 1 Application(s) Topical every 12 hours  aspirin enteric coated 81 milliGRAM(s) Oral daily  atorvastatin 80 milliGRAM(s) Oral at bedtime  budesonide 160 MICROgram(s)/formoterol 4.5 MICROgram(s) Inhaler 2 Puff(s) Inhalation two times a day  chlorhexidine 2% Cloths 1 Application(s) Topical <User Schedule>  cholecalciferol 2000 Unit(s) Oral daily  dexAMETHasone  Injectable 6 milliGRAM(s) IV Push daily  dextrose 5%. 1000 milliLiter(s) (100 mL/Hr) IV Continuous <Continuous>  dextrose 5%. 1000 milliLiter(s) (50 mL/Hr) IV Continuous <Continuous>  dextrose 50% Injectable 25 Gram(s) IV Push once  dextrose 50% Injectable 12.5 Gram(s) IV Push once  dextrose 50% Injectable 25 Gram(s) IV Push once  furosemide   Injectable 40 milliGRAM(s) IV Push every 12 hours  glucagon  Injectable 1 milliGRAM(s) IntraMuscular once  heparin   Injectable 5000 Unit(s) SubCutaneous every 8 hours  insulin glargine Injectable (LANTUS) 60 Unit(s) SubCutaneous at bedtime  insulin lispro (ADMELOG) corrective regimen sliding scale   SubCutaneous three times a day before meals  insulin lispro (ADMELOG) corrective regimen sliding scale   SubCutaneous at bedtime  insulin lispro Injectable (ADMELOG) 28 Unit(s) SubCutaneous three times a day with meals  insulin lispro Injectable (ADMELOG) 15 Unit(s) SubCutaneous at bedtime  ketotifen 0.025% Ophthalmic Solution 1 Drop(s) Both EYES two times a day  loratadine 10 milliGRAM(s) Oral daily  nystatin Powder 1 Application(s) Topical three times a day  oxyCODONE  ER Tablet 60 milliGRAM(s) Oral every 12 hours  polyethylene glycol 3350 17 Gram(s) Oral every 12 hours  remdesivir  IVPB 100 milliGRAM(s) IV Intermittent every 24 hours  remdesivir  IVPB   IV Intermittent     MEDICATIONS  (PRN):  acetaminophen     Tablet .. 650 milliGRAM(s) Oral every 6 hours PRN Temp greater or equal to 38C (100.4F), Mild Pain (1 - 3)  ALPRAZolam 0.25 milliGRAM(s) Oral once PRN premedication  benzocaine 15 mG/menthol 3.6 mG Lozenge 1 Lozenge Oral daily PRN Sore Throat  dextrose Oral Gel 15 Gram(s) Oral once PRN Blood Glucose LESS THAN 70 milliGRAM(s)/deciliter  lactulose Syrup 20 Gram(s) Oral daily PRN constipation  ondansetron    Tablet 4 milliGRAM(s) Oral every 8 hours PRN Nausea and/or Vomiting  oxyCODONE    IR 10 milliGRAM(s) Oral every 4 hours PRN Severe Pain (7 - 10)      CAPILLARY BLOOD GLUCOSE      POCT Blood Glucose.: 385 mg/dL (23 Oct 2022 21:31)  POCT Blood Glucose.: 427 mg/dL (23 Oct 2022 16:09)  POCT Blood Glucose.: 413 mg/dL (23 Oct 2022 11:54)  POCT Blood Glucose.: 408 mg/dL (23 Oct 2022 07:42)    I&O's Summary    23 Oct 2022 07:01  -  24 Oct 2022 07:00  --------------------------------------------------------  IN: 480 mL / OUT: 2600 mL / NET: -2120 mL        PHYSICAL EXAM:  Vital Signs Last 24 Hrs  T(C): 36.5 (24 Oct 2022 04:16), Max: 36.6 (23 Oct 2022 12:10)  T(F): 97.7 (24 Oct 2022 04:16), Max: 97.8 (23 Oct 2022 12:10)  HR: 61 (24 Oct 2022 04:16) (61 - 70)  BP: 137/75 (24 Oct 2022 04:16) (127/78 - 137/75)  BP(mean): --  RR: 18 (24 Oct 2022 04:16) (18 - 19)  SpO2: 100% (24 Oct 2022 04:16) (99% - 100%)    Parameters below as of 24 Oct 2022 04:16  Patient On (Oxygen Delivery Method): nasal cannula  O2 Flow (L/min): 5      GENERAL: obese female, NAD, lying comfortably in bed  HEAD: Atraumatic, normocephalic  EYES: EOMI b/l, conjunctiva and sclera clear  NECK: Supple  RESPIRATORY: Normal respiratory effort; speaking in full sentences; limited anterior lung exam; clear to auscultation without wheezing on lateral fields.   CARDIOVASCULAR: Regular rate and rhythm, normal S1 and S2, no murmur/rub/gallop; No lower extremity edema  ABDOMEN: Nontender, normoactive bowel sounds, no rebound/guarding  MUSCULOSKELETAL: no gross deformities  NEURO: Non focal   SKIN: interigo below pannus with ulcerated lesion, no edema/erythema/discharge from the lesion.   PSYCH: A+O to person, place, and time; affect appropriate    LABS:                          13.3   8.79  )-----------( 236      ( 24 Oct 2022 09:35 )             42.6     10-24    136  |  89<L>  |  60<H>  ----------------------------<  444<H>  4.4   |  33<H>  |  1.61<H>    Ca    9.8      24 Oct 2022 09:35  Phos  3.7     10-24  Mg     2.1     10-24    TPro  7.4  /  Alb  3.7  /  TBili  0.7  /  DBili  x   /  AST  39  /  ALT  34  /  AlkPhos  150<H>  10-22          RADIOLOGY:    Consulted note reviewed  Reviewed Imaging personally

## 2022-10-24 NOTE — PROGRESS NOTE ADULT - ASSESSMENT
A/P: 66F with PMH morbid obesity, moderate persistant asthma, HTN, HLD, DM, CKD3, HFpEF on 4-6L NC oxygen at baseline (not on BiPAP/CPAP qhs), presenting with SOB/LEGER c/f HF exacerbation vs. asthma exacerbation vs. PE vs. exacerbation of pHTN    Wound Consult requested to assist w/ management of: BLE PVD/ PAD  Incontinence of urine and stool  MAD of Under-Breast/ IMF  Moisture Dermatitis of Groin/ Panus skin folds, w/ wounds and         resolving Fungal component    GROIN/ SKIN FOlds/ PANUS/ Breast fplds-       Areas w/ denuded skin (skin break down)- After cleaning Apply TRIAD paste      Areas w/intact skin: After cleansing - Tuck INTERDry Ag QD and prn soiling  Buttocks/ Sacrum  TRIAD BID and prn soiling        Continue w/ attends under pads and Pericare w/ purewick care as per protocol  BLE feet wounds- dpm   BLE elevation & Compression  Abx per Medicine  Moisturize intact skin w/ SWEEN cream BID  Nutrition Consult for optimization        encourage high quality protein, MVI & Vit C to promote wound healing  Hyperglycemia - ADA diet and Lantus & FS w/ ISS  Continue turning and positioning w/ offloading assistive devices as per protocol  Waffle Cushion to chair when oob to chair  Continue w/ low air loss pressure redistribution bed surface   Care as per medicine, will follow with you  Upon discharge f/u as outpatient at [facility] [phone]  D/w team & RN  SOPHIE Cao-C CWS [id number]  I spent 25minutes face to face w/ this pt of which more than 50% of the time was spent counseling & coordinating care of this pt.

## 2022-10-24 NOTE — PROGRESS NOTE ADULT - ASSESSMENT
66F with PMH morbid obesity, moderate persistant asthma, HTN, HLD, DM, CKD3, HFpEF on 4-6L NC oxygen at baseline (not on BiPAP/CPAP qhs), presenting with SOB/LEGER most consistent w/ heart failure exacerbation now improving with Lasix 40IV bid, course c.b COVID on Remdesevir and Dex 66F with PMH morbid obesity, moderate persistant asthma, HTN, HLD, DM, CKD3, HFpEF on 4-6L NC oxygen at baseline (not on BiPAP/CPAP qhs), presenting with SOB/LEGER most consistent w/ heart failure exacerbation now improving with Lasix 40IV bid, course c.b COVID on Remdesevir and Dexamethasone, and hyperglycemia iso Dexa tx.

## 2022-10-24 NOTE — PROGRESS NOTE ADULT - PROBLEM SELECTOR PLAN 3
c/w statin      discussed w/pt and team  Can be reached via TEAMS/pager: [phone]   office:  [phone] (M-F 9a-5pm)               [phone] (nights/weekends)   [email or web address] password [email or web address]

## 2022-10-24 NOTE — PROGRESS NOTE ADULT - SUBJECTIVE AND OBJECTIVE BOX
Diabetes Follow up note:    Chief complaint: T2DM w/steroid induced hyperglycemia    Interval Hx: BG values 300-400s over past 24 hours. Pt received decadron last 3 days w/worsening hyperglycemia. Pt seen at bedside. Reports eating well w/good appetite. On O2 w/some shortness of breath. PCP Dr Mccann manages DM as outpt.     Review of Systems:  General: as above.   GI: Tolerating POs. Denies N/V/D/Abd pain  CV: Denies CP/SOB  ENDO: No S&Sx of hypoglycemia    MEDS:  atorvastatin 80 milliGRAM(s) Oral at bedtime  insulin glargine Injectable (LANTUS) 20 Unit(s) SubCutaneous every morning  insulin glargine Injectable (LANTUS) 60 Unit(s) SubCutaneous at bedtime  insulin lispro (ADMELOG) corrective regimen sliding scale   SubCutaneous three times a day before meals  insulin lispro (ADMELOG) corrective regimen sliding scale   SubCutaneous at bedtime  insulin lispro Injectable (ADMELOG) 15 Unit(s) SubCutaneous at bedtime  insulin lispro Injectable (ADMELOG) 36 Unit(s) SubCutaneous three times a day with meals    remdesivir  IVPB 100 milliGRAM(s) IV Intermittent every 24 hours  remdesivir  IVPB   IV Intermittent     Allergies    adhesives (Rash)  Bactrim (Flushing)  latex (Rash)  wool- rash, itch (Other)      PE:  General: Female lying in bed. NAD.   Vital Signs Last 24 Hrs  T(C): 36.5 (24 Oct 2022 04:16), Max: 36.6 (23 Oct 2022 12:10)  T(F): 97.7 (24 Oct 2022 04:16), Max: 97.8 (23 Oct 2022 12:10)  HR: 61 (24 Oct 2022 04:16) (61 - 70)  BP: 137/75 (24 Oct 2022 04:16) (127/78 - 137/75)  BP(mean): --  RR: 18 (24 Oct 2022 04:16) (18 - 19)  SpO2: 100% (24 Oct 2022 04:16) (99% - 100%)    Parameters below as of 24 Oct 2022 04:16  Patient On (Oxygen Delivery Method): nasal cannula  O2 Flow (L/min): 5    Abd: Soft, NT,ND, Morbid obesity.   Extremities: Warm. chronic vascular changes to LEs.   Neuro: A&O X3    LABS:  POCT Blood Glucose.: 328 mg/dL (10-24-22 @ 07:40)  POCT Blood Glucose.: 385 mg/dL (10-23-22 @ 21:31)  POCT Blood Glucose.: 427 mg/dL (10-23-22 @ 16:09)  POCT Blood Glucose.: 413 mg/dL (10-23-22 @ 11:54)  POCT Blood Glucose.: 408 mg/dL (10-23-22 @ 07:42)  POCT Blood Glucose.: 438 mg/dL (10-23-22 @ 04:36)  POCT Blood Glucose.: 475 mg/dL (10-22-22 @ 23:49)  POCT Blood Glucose.: 485 mg/dL (10-22-22 @ 23:48)  POCT Blood Glucose.: 509 mg/dL (10-22-22 @ 22:00)  POCT Blood Glucose.: 482 mg/dL (10-22-22 @ 21:59)  POCT Blood Glucose.: 500 mg/dL (10-22-22 @ 16:46)  POCT Blood Glucose.: 465 mg/dL (10-22-22 @ 16:44)  POCT Blood Glucose.: 468 mg/dL (10-22-22 @ 15:21)  POCT Blood Glucose.: 432 mg/dL (10-22-22 @ 11:51)  POCT Blood Glucose.: 375 mg/dL (10-22-22 @ 07:50)  POCT Blood Glucose.: 394 mg/dL (10-21-22 @ 22:23)  POCT Blood Glucose.: 319 mg/dL (10-21-22 @ 16:50)                            13.3   8.79  )-----------( 236      ( 24 Oct 2022 09:35 )             42.6       10-24    136  |  89<L>  |  60<H>  ----------------------------<  444<H>  4.4   |  33<H>  |  1.61<H>    eGFR: 35<L>    Ca    9.8      10-24  Mg     2.1     10-24  Phos  3.7     10-24    TPro  7.4  /  Alb  3.7  /  TBili  0.7  /  DBili  x   /  AST  39  /  ALT  34  /  AlkPhos  150<H>  10-22      A1C with Estimated Average Glucose Result: 7.3 % (10-13-22 @ 11:47)          Contact number: mario [phone] or [phone]

## 2022-10-24 NOTE — PROGRESS NOTE ADULT - PROBLEM SELECTOR PLAN 6
- Cr ~1.9. Baseline ~1.6  - Avoid nephrotoxins, dose per GFR - Cr ~1.61 today.  Baseline ~1.6  - Avoid nephrotoxins, dose per GFR

## 2022-10-24 NOTE — PROGRESS NOTE ADULT - PROBLEM SELECTOR PLAN 2
COVID+ on 10/20. Blood in sputum consistent w/ irritation/inflammation 2/2 to this. Patient w/ stable O2 needs however now with worsened wheezes, unclear if HF exacerbation improving and COVID worsening.  - will continue remdesivir  - patient endorses clinical improvement of wheezing and sx with Dexamethasone so will continue   - however given steroid induced hyperglycemia, will consult endo COVID+ on 10/20. Blood in sputum consistent w/ irritation/inflammation 2/2 to this. Patient w/ stable O2 needs (4-6L at home). Continues to endorse wheezing and intermittent cough but stable respiratory stanpoint at baseline O2 requirement saturating in high 90s.     - Last day of remdesivir today  - will hold off Dexamethasone given patient is at baseline respiratory status.

## 2022-10-24 NOTE — PROGRESS NOTE ADULT - SUBJECTIVE AND OBJECTIVE BOX
[facility]-- WOUND TEAM -- FOLLOW UP NOTE  --------------------------------------------------------------------------------    24 hour events/subjective:          Diet:  Diet, Consistent Carbohydrate w/Evening Snack:   DASH/TLC Sodium & Cholesterol Restricted (DASH)  1000mL Fluid Restriction ([id number]) (10-12-22 @ 21:17)      ROS: General/ SKIN/ MSKI see HPI  all other systems negative      ALLERGIES & MEDICATIONS  --------------------------------------------------------------------------------  Allergies  adhesives (Rash)  Bactrim (Flushing)  latex (Rash)  wool- rash, itch (Other)        STANDING INPATIENT MEDICATIONS  albuterol/ipratropium for Nebulization 3 milliLiter(s) Nebulizer every 6 hours  ammonium lactate 12% Lotion 1 Application(s) Topical every 12 hours  aspirin enteric coated 81 milliGRAM(s) Oral daily  atorvastatin 80 milliGRAM(s) Oral at bedtime  budesonide 160 MICROgram(s)/formoterol 4.5 MICROgram(s) Inhaler 2 Puff(s) Inhalation two times a day  chlorhexidine 2% Cloths 1 Application(s) Topical <User Schedule>  cholecalciferol 2000 Unit(s) Oral daily  dextrose 5%. 1000 milliLiter(s) IV Continuous <Continuous>  dextrose 5%. 1000 milliLiter(s) IV Continuous <Continuous>  dextrose 50% Injectable 25 Gram(s) IV Push once  dextrose 50% Injectable 12.5 Gram(s) IV Push once  dextrose 50% Injectable 25 Gram(s) IV Push once  glucagon  Injectable 1 milliGRAM(s) IntraMuscular once  heparin   Injectable 5000 Unit(s) SubCutaneous every 8 hours  insulin glargine Injectable (LANTUS) 20 Unit(s) SubCutaneous every morning  insulin glargine Injectable (LANTUS) 60 Unit(s) SubCutaneous at bedtime  insulin lispro (ADMELOG) corrective regimen sliding scale   SubCutaneous three times a day before meals  insulin lispro (ADMELOG) corrective regimen sliding scale   SubCutaneous <User Schedule>  insulin lispro Injectable (ADMELOG) 36 Unit(s) SubCutaneous three times a day with meals  insulin lispro Injectable (ADMELOG) 15 Unit(s) SubCutaneous at bedtime  ketotifen 0.025% Ophthalmic Solution 1 Drop(s) Both EYES two times a day  loratadine 10 milliGRAM(s) Oral daily  nystatin Powder 1 Application(s) Topical three times a day  oxyCODONE  ER Tablet 60 milliGRAM(s) Oral every 12 hours  polyethylene glycol 3350 17 Gram(s) Oral every 12 hours  remdesivir  IVPB 100 milliGRAM(s) IV Intermittent every 24 hours  remdesivir  IVPB   IV Intermittent       PRN INPATIENT MEDICATION  acetaminophen  Tablet 650 milliGRAM(s) Oral every 6 hours PRN  ALPRAZolam 0.25 milliGRAM(s) Oral once PRN  benzocaine 15 mG/menthol 3.6 mG Lozenge 1 Lozenge Oral daily PRN  dextrose Oral Gel 15 Gram(s) Oral once PRN  lactulose Syrup 20 Gram(s) Oral daily PRN  ondansetron    Tablet 4 milliGRAM(s) Oral every 8 hours PRN  oxyCODONE    IR 10 milliGRAM(s) Oral every 4 hours PRN        VITALS/PHYSICAL EXAM  --------------------------------------------------------------------------------  T(C): 36.5 (10-24-22 @ 11:36), Max: 36.6 (10-23-22 @ 20:25)  HR: 64 (10-24-22 @ 11:36) (61 - 70)  BP: 141/64 (10-24-22 @ 11:36) (127/78 - 141/64)  RR: 18 (10-24-22 @ 11:36) (18 - 19)  SpO2: 99% (10-24-22 @ 11:36) (99% - 100%)  Wt(kg): --        10-23-22 @ 07:01  -  10-24-22 @ 07:00  --------------------------------------------------------  IN: 480 mL / OUT: 2600 mL / NET: -2120 mL    10-24-22 @ 07:01  -  10-24-22 @ 16:02  --------------------------------------------------------  IN: 600 mL / OUT: 1000 mL / NET: -400 mL            LABS/ CULTURES/ RADIOLOGY:              13.3   8.79  >-----------<  236      [10-24-22 @ 09:35]              42.6     136  |  89  |  60  ----------------------------<  444      [10-24-22 @ 09:35]  4.4   |  33  |  1.61        Ca     9.8     [10-24-22 @ 09:35]      Mg     2.1     [10-24-22 @ 09:35]      Phos  3.7     [10-24-22 @ 09:35]      CAPILLARY BLOOD GLUCOSE  POCT Blood Glucose.: 406 mg/dL (24 Oct 2022 11:33)  POCT Blood Glucose.: 328 mg/dL (24 Oct 2022 07:40)  POCT Blood Glucose.: 385 mg/dL (23 Oct 2022 21:31)  POCT Blood Glucose.: 427 mg/dL (23 Oct 2022 16:09)      A1C with Estimated Average Glucose Result: 7.3 % (10-13-22 @ 11:47)   [facility]-- WOUND TEAM -- FOLLOW UP NOTE  --------------------------------------------------------------------------------    24 hour events/subjective:    afebrile  turning needs some assist  tolerating po  somewhat continent  likes the cloths for moisture in skin folds     sometimes she says she gets it under breasts=       and it helps using under breasts too  but pt c/o all of a sudden- in pannus fold she has skin breakdown with papercut       like burn that RN tried Nystatin but just cakes not better    Diet:  Diet, Consistent Carbohydrate w/Evening Snack:   DASH/TLC Sodium & Cholesterol Restricted (DASH)  1000mL Fluid Restriction ([id number]) (10-12-22 @ 21:17)      ROS: General/ SKIN/ MSKI see HPI  all other systems negative      ALLERGIES & MEDICATIONS  --------------------------------------------------------------------------------  Allergies  adhesives (Rash)  Bactrim (Flushing)  latex (Rash)  wool- rash, itch (Other)        STANDING INPATIENT MEDICATIONS  albuterol/ipratropium for Nebulization 3 milliLiter(s) Nebulizer every 6 hours  ammonium lactate 12% Lotion 1 Application(s) Topical every 12 hours  aspirin enteric coated 81 milliGRAM(s) Oral daily  atorvastatin 80 milliGRAM(s) Oral at bedtime  budesonide 160 MICROgram(s)/formoterol 4.5 MICROgram(s) Inhaler 2 Puff(s) Inhalation two times a day  chlorhexidine 2% Cloths 1 Application(s) Topical <User Schedule>  cholecalciferol 2000 Unit(s) Oral daily  dextrose 5%. 1000 milliLiter(s) IV Continuous <Continuous>  dextrose 5%. 1000 milliLiter(s) IV Continuous <Continuous>  dextrose 50% Injectable 25 Gram(s) IV Push once  dextrose 50% Injectable 12.5 Gram(s) IV Push once  dextrose 50% Injectable 25 Gram(s) IV Push once  glucagon  Injectable 1 milliGRAM(s) IntraMuscular once  heparin   Injectable 5000 Unit(s) SubCutaneous every 8 hours  insulin glargine Injectable (LANTUS) 20 Unit(s) SubCutaneous every morning  insulin glargine Injectable (LANTUS) 60 Unit(s) SubCutaneous at bedtime  insulin lispro (ADMELOG) corrective regimen sliding scale   SubCutaneous three times a day before meals  insulin lispro (ADMELOG) corrective regimen sliding scale   SubCutaneous <User Schedule>  insulin lispro Injectable (ADMELOG) 36 Unit(s) SubCutaneous three times a day with meals  insulin lispro Injectable (ADMELOG) 15 Unit(s) SubCutaneous at bedtime  ketotifen 0.025% Ophthalmic Solution 1 Drop(s) Both EYES two times a day  loratadine 10 milliGRAM(s) Oral daily  nystatin Powder 1 Application(s) Topical three times a day  oxyCODONE  ER Tablet 60 milliGRAM(s) Oral every 12 hours  polyethylene glycol 3350 17 Gram(s) Oral every 12 hours  remdesivir  IVPB 100 milliGRAM(s) IV Intermittent every 24 hours  remdesivir  IVPB   IV Intermittent       PRN INPATIENT MEDICATION  acetaminophen  Tablet 650 milliGRAM(s) Oral every 6 hours PRN  ALPRAZolam 0.25 milliGRAM(s) Oral once PRN  benzocaine 15 mG/menthol 3.6 mG Lozenge 1 Lozenge Oral daily PRN  dextrose Oral Gel 15 Gram(s) Oral once PRN  lactulose Syrup 20 Gram(s) Oral daily PRN  ondansetron  Tablet 4 milliGRAM(s) Oral every 8 hours PRN  oxyCODONE  IR 10 milliGRAM(s) Oral every 4 hours PRN        VITALS/PHYSICAL EXAM  --------------------------------------------------------------------------------  T(C): 36.5 (10-24-22 @ 11:36), Max: 36.6 (10-23-22 @ 20:25)  HR: 64 (10-24-22 @ 11:36) (61 - 70)  BP: 141/64 (10-24-22 @ 11:36) (127/78 - 141/64)  RR: 18 (10-24-22 @ 11:36) (18 - 19)  SpO2: 99% (10-24-22 @ 11:36) (99% - 100%)  Wt(kg): --        10-23-22 @ 07:01  -  10-24-22 @ 07:00  --------------------------------------------------------  IN: 480 mL / OUT: 2600 mL / NET: -2120 mL    10-24-22 @ 07:01  -  10-24-22 @ 16:02  --------------------------------------------------------  IN: 600 mL / OUT: 1000 mL / NET: -400 mL      NAD  A&Ox3  MO  Bariatric Total Care Sport  HEENT:  NC/AT, EOMI, sclera clear, mucosa moist, throat clear, trachea midline, neck supple  Neurology: strength & sensation grossly intact  Psych: calm/ appropriate  Musculoskeletal:   FROM, no deformities/ contractures  Vascular: BLE equally warm,  no cyanosis, clubbing, edema        BLE edema equal, no acute ischemia noted        BLE hemosiderin staining        Bilateral feet as per dpm  Skin:  dry w/ good turgor  Breast IMF w/ faint erythema blanchable w/o blistering or drainage  Abdominal and Groin Pannus skin folds w/ moist skin w/ erythema of moisture     abdominal pannus now with denuded skin- no blistering or active drainage  No odor, erythema, increased warmth, tenderness, induration, fluctuance, nor crepitus      LABS/ CULTURES/ RADIOLOGY:              13.3   8.79  >-----------<  236      [10-24-22 @ 09:35]              42.6     136  |  89  |  60  ----------------------------<  444      [10-24-22 @ 09:35]  4.4   |  33  |  1.61        Ca     9.8     [10-24-22 @ 09:35]      Mg     2.1     [10-24-22 @ 09:35]      Phos  3.7     [10-24-22 @ 09:35]      CAPILLARY BLOOD GLUCOSE  POCT Blood Glucose.: 406 mg/dL (24 Oct 2022 11:33)  POCT Blood Glucose.: 328 mg/dL (24 Oct 2022 07:40)  POCT Blood Glucose.: 385 mg/dL (23 Oct 2022 21:31)  POCT Blood Glucose.: 427 mg/dL (23 Oct 2022 16:09)      A1C with Estimated Average Glucose Result: 7.3 % (10-13-22 @ 11:47)

## 2022-10-24 NOTE — PROGRESS NOTE ADULT - ASSESSMENT
66 y/o F w/ uncontrolled Type 2 DM with hyperglycemia exacerbated by steroids complicated by neuropathy and nephropathy, HTN, HLD admitted with COVID (high risk patient with severe hyperglycemia with glucose values > 400's at high risk of CAD and CVA with high level decision-making). On decadron course for covid w/worsening hyperglycemia. Team clarified received last dose of steroids this AM so expect glycemic control to improve over next 2-3 days. Tolerating POs. BG goal (100-180mg/dl).

## 2022-10-24 NOTE — PROGRESS NOTE ADULT - PROBLEM SELECTOR PLAN 1
-test BG AC/HS/2AM  -c/w Lantus 60 units QHS and added Lantus 20 units in AM today.   -Increase Admelog 36 units TID w/meals today   -c/w Admelog 15 units w/HS snack if eating  -c/w Admelog moderate correction scale AC and mod HS scale. Add 2AM scale to correct >250mg/dl if elevated overnight  -cons carb diet  Outpt. endo follow-up  Outpt. optho, podiatry, nephrology  Plan to d/c on basal bolus at home doses as A1c close to goal.

## 2022-10-25 LAB
ANION GAP SERPL CALC-SCNC: 13 MMOL/L — SIGNIFICANT CHANGE UP (ref 5–17)
BUN SERPL-MCNC: 58 MG/DL — HIGH (ref 7–23)
CALCIUM SERPL-MCNC: 10.1 MG/DL — SIGNIFICANT CHANGE UP (ref 8.4–10.5)
CHLORIDE SERPL-SCNC: 90 MMOL/L — LOW (ref 96–108)
CO2 SERPL-SCNC: 35 MMOL/L — HIGH (ref 22–31)
CREAT SERPL-MCNC: 1.63 MG/DL — HIGH (ref 0.5–1.3)
EGFR: 34 ML/MIN/1.73M2 — LOW
GLUCOSE BLDC GLUCOMTR-MCNC: 105 MG/DL — HIGH (ref 70–99)
GLUCOSE BLDC GLUCOMTR-MCNC: 135 MG/DL — HIGH (ref 70–99)
GLUCOSE BLDC GLUCOMTR-MCNC: 157 MG/DL — HIGH (ref 70–99)
GLUCOSE BLDC GLUCOMTR-MCNC: 200 MG/DL — HIGH (ref 70–99)
GLUCOSE BLDC GLUCOMTR-MCNC: 210 MG/DL — HIGH (ref 70–99)
GLUCOSE BLDC GLUCOMTR-MCNC: 226 MG/DL — HIGH (ref 70–99)
GLUCOSE SERPL-MCNC: 251 MG/DL — HIGH (ref 70–99)
HCT VFR BLD CALC: 46 % — HIGH (ref 34.5–45)
HGB BLD-MCNC: 14 G/DL — SIGNIFICANT CHANGE UP (ref 11.5–15.5)
MAGNESIUM SERPL-MCNC: 2.2 MG/DL — SIGNIFICANT CHANGE UP (ref 1.6–2.6)
MCHC RBC-ENTMCNC: 26.6 PG — LOW (ref 27–34)
MCHC RBC-ENTMCNC: 30.4 GM/DL — LOW (ref 32–36)
MCV RBC AUTO: 87.5 FL — SIGNIFICANT CHANGE UP (ref 80–100)
NRBC # BLD: 0 /100 WBCS — SIGNIFICANT CHANGE UP (ref 0–0)
PHOSPHATE SERPL-MCNC: 3.7 MG/DL — SIGNIFICANT CHANGE UP (ref 2.5–4.5)
PLATELET # BLD AUTO: 270 K/UL — SIGNIFICANT CHANGE UP (ref 150–400)
POTASSIUM SERPL-MCNC: 3.8 MMOL/L — SIGNIFICANT CHANGE UP (ref 3.5–5.3)
POTASSIUM SERPL-SCNC: 3.8 MMOL/L — SIGNIFICANT CHANGE UP (ref 3.5–5.3)
RBC # BLD: 5.26 M/UL — HIGH (ref 3.8–5.2)
RBC # FLD: 14.5 % — SIGNIFICANT CHANGE UP (ref 10.3–14.5)
SARS-COV-2 RNA SPEC QL NAA+PROBE: DETECTED
SODIUM SERPL-SCNC: 138 MMOL/L — SIGNIFICANT CHANGE UP (ref 135–145)
WBC # BLD: 10.52 K/UL — HIGH (ref 3.8–10.5)
WBC # FLD AUTO: 10.52 K/UL — HIGH (ref 3.8–10.5)

## 2022-10-25 PROCEDURE — 99232 SBSQ HOSP IP/OBS MODERATE 35: CPT | Mod: GC

## 2022-10-25 PROCEDURE — 99232 SBSQ HOSP IP/OBS MODERATE 35: CPT

## 2022-10-25 RX ORDER — INSULIN LISPRO 100/ML
15 VIAL (ML) SUBCUTANEOUS ONCE
Refills: 0 | Status: COMPLETED | OUTPATIENT
Start: 2022-10-25 | End: 2022-10-25

## 2022-10-25 RX ORDER — FUROSEMIDE 40 MG
40 TABLET ORAL DAILY
Refills: 0 | Status: DISCONTINUED | OUTPATIENT
Start: 2022-10-25 | End: 2022-10-26

## 2022-10-25 RX ADMIN — Medication 40 UNIT(S): at 12:13

## 2022-10-25 RX ADMIN — Medication 2: at 12:14

## 2022-10-25 RX ADMIN — KETOTIFEN FUMARATE 1 DROP(S): 0.34 SOLUTION OPHTHALMIC at 17:29

## 2022-10-25 RX ADMIN — HEPARIN SODIUM 5000 UNIT(S): 5000 INJECTION INTRAVENOUS; SUBCUTANEOUS at 05:42

## 2022-10-25 RX ADMIN — OXYCODONE HYDROCHLORIDE 10 MILLIGRAM(S): 5 TABLET ORAL at 02:26

## 2022-10-25 RX ADMIN — OXYCODONE HYDROCHLORIDE 10 MILLIGRAM(S): 5 TABLET ORAL at 22:07

## 2022-10-25 RX ADMIN — POLYETHYLENE GLYCOL 3350 17 GRAM(S): 17 POWDER, FOR SOLUTION ORAL at 17:30

## 2022-10-25 RX ADMIN — Medication 2000 UNIT(S): at 12:17

## 2022-10-25 RX ADMIN — Medication 40 MILLIGRAM(S): at 05:42

## 2022-10-25 RX ADMIN — BUDESONIDE AND FORMOTEROL FUMARATE DIHYDRATE 2 PUFF(S): 160; 4.5 AEROSOL RESPIRATORY (INHALATION) at 17:30

## 2022-10-25 RX ADMIN — Medication 15 UNIT(S): at 17:33

## 2022-10-25 RX ADMIN — Medication 1 APPLICATION(S): at 17:31

## 2022-10-25 RX ADMIN — Medication 3 MILLILITER(S): at 12:18

## 2022-10-25 RX ADMIN — Medication 4: at 08:04

## 2022-10-25 RX ADMIN — Medication 3 MILLILITER(S): at 05:39

## 2022-10-25 RX ADMIN — OXYCODONE HYDROCHLORIDE 60 MILLIGRAM(S): 5 TABLET ORAL at 05:39

## 2022-10-25 RX ADMIN — OXYCODONE HYDROCHLORIDE 10 MILLIGRAM(S): 5 TABLET ORAL at 11:11

## 2022-10-25 RX ADMIN — Medication 1 APPLICATION(S): at 05:42

## 2022-10-25 RX ADMIN — OXYCODONE HYDROCHLORIDE 60 MILLIGRAM(S): 5 TABLET ORAL at 17:28

## 2022-10-25 RX ADMIN — Medication 15 UNIT(S): at 21:50

## 2022-10-25 RX ADMIN — HEPARIN SODIUM 5000 UNIT(S): 5000 INJECTION INTRAVENOUS; SUBCUTANEOUS at 13:22

## 2022-10-25 RX ADMIN — OXYCODONE HYDROCHLORIDE 10 MILLIGRAM(S): 5 TABLET ORAL at 21:17

## 2022-10-25 RX ADMIN — LORATADINE 10 MILLIGRAM(S): 10 TABLET ORAL at 12:16

## 2022-10-25 RX ADMIN — Medication 40 UNIT(S): at 08:04

## 2022-10-25 RX ADMIN — OXYCODONE HYDROCHLORIDE 60 MILLIGRAM(S): 5 TABLET ORAL at 06:34

## 2022-10-25 RX ADMIN — OXYCODONE HYDROCHLORIDE 10 MILLIGRAM(S): 5 TABLET ORAL at 10:41

## 2022-10-25 RX ADMIN — INSULIN GLARGINE 60 UNIT(S): 100 INJECTION, SOLUTION SUBCUTANEOUS at 21:49

## 2022-10-25 RX ADMIN — CHLORHEXIDINE GLUCONATE 1 APPLICATION(S): 213 SOLUTION TOPICAL at 06:31

## 2022-10-25 RX ADMIN — BUDESONIDE AND FORMOTEROL FUMARATE DIHYDRATE 2 PUFF(S): 160; 4.5 AEROSOL RESPIRATORY (INHALATION) at 05:42

## 2022-10-25 RX ADMIN — OXYCODONE HYDROCHLORIDE 10 MILLIGRAM(S): 5 TABLET ORAL at 03:30

## 2022-10-25 RX ADMIN — ATORVASTATIN CALCIUM 80 MILLIGRAM(S): 80 TABLET, FILM COATED ORAL at 21:16

## 2022-10-25 RX ADMIN — Medication 3 MILLILITER(S): at 17:30

## 2022-10-25 RX ADMIN — HEPARIN SODIUM 5000 UNIT(S): 5000 INJECTION INTRAVENOUS; SUBCUTANEOUS at 21:16

## 2022-10-25 RX ADMIN — Medication 81 MILLIGRAM(S): at 12:16

## 2022-10-25 RX ADMIN — INSULIN GLARGINE 20 UNIT(S): 100 INJECTION, SOLUTION SUBCUTANEOUS at 08:03

## 2022-10-25 RX ADMIN — OXYCODONE HYDROCHLORIDE 60 MILLIGRAM(S): 5 TABLET ORAL at 17:58

## 2022-10-25 NOTE — PROGRESS NOTE ADULT - ASSESSMENT
66F with PMH morbid obesity, moderate persistant asthma, HTN, HLD, DM, CKD3, HFpEF on 4-6L NC oxygen at baseline (not on BiPAP/CPAP qhs), presenting with SOB/LEGER most consistent w/ heart failure exacerbation now improving with Lasix 40IV bid, course c.b COVID on Remdesevir and Dexamethasone, and hyperglycemia iso Dexa tx.

## 2022-10-25 NOTE — PROGRESS NOTE ADULT - PROBLEM SELECTOR PLAN 1
Pt w/ dyspnea despite supplemental O2, on 5L NC (home 3-4L NC, uses daily nebulizer). Noted to be anasarcic by family prior to admission. Symptoms improved since admission but now stable. Likely 2/2 HF exacerbation given hx, now complicated by COVID+ as below. Duplex w/o evidence of DVT and d-dimer 339, low suspicion for PE.  - TTE 10/19 w/ EF 55-60%, limited findings due to habitus, unable to discern pHTN (previous noted at [facility] on 11/20)     - appreciate cardiology recs: no RHC iso other issues, consider pulm consult, BIPAP/AVAPS, weight loss     - pCO2 improved today     -  trial AVAPS again tonight with low-dose xanax (0.25) premedication- however patient did not tolerate   - down-titrate lasix to 40IV daily today  - continue duonebs, consdering discontinuing dexamethasone, given patient not more hypoxic than baseline and is causing hyperglycemia   - continue symbicort (therapeutic interchange for Advair)  - Daily bed weights, strict I/O, fluid restrict  - Cards consulted for whether patient would benefit from RHC, recommended SGLT2-i  - endorses clinical improvement, consider decreasing to 40 IV lasix daily on Monday Pt w/ dyspnea despite supplemental O2, on 5L NC (home 3-4L NC, uses daily nebulizer). Noted to be anasarcic by family prior to admission. Symptoms improved since admission but now stable. Likely 2/2 HF exacerbation given hx, now complicated by COVID+ as below. Duplex w/o evidence of DVT and d-dimer 339, low suspicion for PE.  - TTE 10/19 w/ EF 55-60%, limited findings due to habitus, unable to discern pHTN (previous noted at [facility] on 11/20)     - appreciate cardiology recs: no RHC iso other issues, consider pulm consult, BIPAP/AVAPS, weight loss     - pCO2 improved today     -  trial AVAPS with low-dose xanax (0.25) failed; pt could not tolerate   - Transition to po lasix 40mg from IV today  - continue duonebs, holding dexamethasone given at baseline oxygen status  - continue symbicort (therapeutic interchange for Advair)  - Daily bed weights, strict I/O, fluid restrict  - Cards consulted for whether patient would benefit from RHC, recommended SGLT2-i but holding off given high risk of infection  - endorses clinical improvement

## 2022-10-25 NOTE — PROGRESS NOTE ADULT - PROBLEM SELECTOR PLAN 3
On Toujeo 80 units AM, 84 units PM + Novolog 20 qAC at home.   Hyperglycemic to 500+ likely exacerbated by dexamethasone started 10/21.   - Endo consulted. Recs for 20 U Lantus AM, 60 U Lantus at bedtime, Admelog 36 U premeal, Admelog 15 U at bedtime.   - Increased lantus qHS from 42->45U--> 50 -> 60  - Increased premeal insulin from 4->12U-> 15 -> 36, will titrate back down as pt now off dex  - Moderate dose correctional scale  - Endo recs appreciated Home: Toujeo 80 units AM, 84 units PM + Novolog 20 qAC  Hyperglycemic to 500+ likely exacerbated by dexamethasone started 10/21.   Glucose in the 200s now.   - Endo consulted. Recs for 20 U Lantus AM, 60 U Lantus at bedtime, Admelog 36 U premeal, Admelog 15 U at bedtime.   - Increased lantus qHS from 42->45U--> 50 -> 60 -> 80  - Increased premeal insulin from 4->12U-> 15 -> 36, will titrate back down as pt now off dex  - Moderate dose correctional scale  - Endo recs appreciated; plan for discharging patient home on home regimen given good control (A1c 7.3)

## 2022-10-25 NOTE — PROGRESS NOTE ADULT - ATTENDING COMMENTS
66F with PMH morbid obesity, moderate persistent asthma, HTN, HLD, DM, CKD3, HFpEF on 4-6L NC oxygen at baseline (not on BiPAP/CPAP qhs), presenting with SOB/LEGER most consistent w/ heart failure exacerbation now improving with Lasix 40IV bid, course c.b COVID on Remdesevir and Dexamethasone, and hyperglycemia iso Dexa tx.     #Acute on chronic hypoxic respiratory failure  #COVID PNA  #Hyperglycemia/DM2  #CKD3  #HFpEF  #pHTN  - fingersticks better controlled  - clinically euvolemic, back to baseline O2 requirements 4-5L NC  - on diuresis with lasix, swtiched to PO lasix today  - cardiology recs appreciated, will consider starting SGTL2 inhibitor but my concern is that patient is mostly bedbound, and may have increased risk of infection, may re-consider outpatient   - c/w CPAP/BIPAP at night to assist with OHS   - dispo pending home care setup, CM and  to follow up

## 2022-10-25 NOTE — PROGRESS NOTE ADULT - SUBJECTIVE AND OBJECTIVE BOX
DIABETES FOLLOW UP NOTE: Saw pt earlier today    Chief Complaint: Endocrine consult requested for management of T2DM    INTERVAL HX: Pt stable, reports tolerating POs with BG levels improved but still above goal in 200s today while on present insulin doses. No hypoglycemia. Reports on/off SOB on  O2. + COVID.       Review of Systems:  General: As above  Cardiovascular: No chest pain, palpitations  Respiratory: + SOB, ON/OFF cough  GI: No nausea, vomiting, abdominal pain  Endocrine: No polyuria, polydipsia or S&Sx of hypoglycemia    Allergies    adhesives (Rash)  Bactrim (Flushing)  latex (Rash)  wool- rash, itch (Other)    Intolerances      MEDICATIONS:  atorvastatin 80 milliGRAM(s) Oral at bedtime  cholecalciferol 2000 Unit(s) Oral daily  insulin glargine Injectable (LANTUS) 20 Unit(s) SubCutaneous every morning  insulin glargine Injectable (LANTUS) 60 Unit(s) SubCutaneous at bedtime  insulin lispro (ADMELOG) corrective regimen sliding scale   SubCutaneous <User Schedule>  insulin lispro (ADMELOG) corrective regimen sliding scale   SubCutaneous three times a day before meals  insulin lispro Injectable (ADMELOG) 15 Unit(s) SubCutaneous at bedtime  insulin lispro Injectable (ADMELOG) 40 Unit(s) SubCutaneous three times a day with meals      PHYSICAL EXAM:  VITALS: T(C): 36.6 (10-25-22 @ 11:47)  T(F): 97.9 (10-25-22 @ 11:47), Max: 98.5 (10-25-22 @ 05:36)  HR: 84 (10-25-22 @ 11:47) (64 - 84)  BP: 138/60 (10-25-22 @ 11:47) (110/64 - 147/70)  RR:  (18 - 18)  SpO2:  (95% - 100%)  Wt(kg): --  GENERAL: Female laying in bed in NAD  Abdomen: Soft, nontender, non distended. + obesity,  pendular abdomen  Extremities: Warm, + edema in LEs with chronic vascular changes including darker skin discoloration, dryness and wrinkle looking skin. Per pt edema has improved greatly.  + R charcot foot  NEURO: A&O X3    LABS:  POCT Blood Glucose.: 200 mg/dL (10-25-22 @ 11:42)  POCT Blood Glucose.: 210 mg/dL (10-25-22 @ 07:30)  POCT Blood Glucose.: 226 mg/dL (10-25-22 @ 01:58)  POCT Blood Glucose.: 280 mg/dL (10-24-22 @ 21:58)  POCT Blood Glucose.: 339 mg/dL (10-24-22 @ 17:01)  POCT Blood Glucose.: 406 mg/dL (10-24-22 @ 11:33)  POCT Blood Glucose.: 328 mg/dL (10-24-22 @ 07:40)  POCT Blood Glucose.: 385 mg/dL (10-23-22 @ 21:31)  POCT Blood Glucose.: 427 mg/dL (10-23-22 @ 16:09)  POCT Blood Glucose.: 413 mg/dL (10-23-22 @ 11:54)  POCT Blood Glucose.: 408 mg/dL (10-23-22 @ 07:42)  POCT Blood Glucose.: 438 mg/dL (10-23-22 @ 04:36)  POCT Blood Glucose.: 475 mg/dL (10-22-22 @ 23:49)  POCT Blood Glucose.: 485 mg/dL (10-22-22 @ 23:48)  POCT Blood Glucose.: 509 mg/dL (10-22-22 @ 22:00)  POCT Blood Glucose.: 482 mg/dL (10-22-22 @ 21:59)  POCT Blood Glucose.: 500 mg/dL (10-22-22 @ 16:46)  POCT Blood Glucose.: 465 mg/dL (10-22-22 @ 16:44)                            14.0   10.52 )-----------( 270      ( 25 Oct 2022 09:43 )             46.0       10-25    138  |  90<L>  |  58<H>  ----------------------------<  251<H>  3.8   |  35<H>  |  1.63<H>    eGFR: 34<L>    Ca    10.1      10-25  Mg     2.2     10-25  Phos  3.7     10-25    A1C with Estimated Average Glucose Result: 7.3 % (10-13-22 @ 11:47)      Estimated Average Glucose: 163 mg/dL (10-13-22 @ 11:47)

## 2022-10-25 NOTE — PROGRESS NOTE ADULT - PROBLEM SELECTOR PLAN 1
-test BG AC/HS/2AM  -c/w Lantus 60 units QHS and Lantus 20 units in AM for now  -c/w Admelog 40 units TID w/meals today   -c/w Admelog 15 units w/HS snack if eating. Hold if not eating  -c/w Admelog moderate correction scale AC and mod HS scale. C/w 2AM scale to correct >250mg/dl if elevated overnight  -Will adjust insulin doses as needed. Expecting BG to improve now that pt is off steroids. Pt very insulin resistant.  Discharge:  Plan to d/c on basal bolus at home doses as A1c close to goal. Pt to c/w SGLT2i for DM/weight control/HF and renal protection if not contraindicated.   Outpt. endo follow-up. Dr Amaral  Outpt. optho, podiatry, nephrology  Make sure pt has Rx for all DM supplies and insulin/ DM meds. -test BG AC/HS/2AM  -c/w Lantus 60 units QHS and Lantus 20 units in AM for now  -c/w Admelog 40 units TID w/meals today   -c/w Admelog 15 units w/HS snack if eating. Hold if not eating  -c/w Admelog moderate correction scale AC and mod HS scale. C/w 2AM scale to correct >250mg/dl if elevated overnight  -Will adjust insulin doses as needed. Expecting BG to improve now that pt is off steroids. Pt very insulin resistant.  Discharge:  Plan to d/c on basal bolus at home doses as A1c close to goal. Pt could benefit from a SGLT2i for DM/weight control/HF and renal protection if not contraindicated.   Outpt. endo follow-up. Dr Amaral  Outpt. optho, podiatry, nephrology  Make sure pt has Rx for all DM supplies and insulin/ DM meds. -test BG AC/HS/2AM  -c/w Lantus 60 units QHS and Lantus 20 units in AM for now  -c/w Admelog 40 units TID w/meals today   -c/w Admelog 15 units w/HS snack if eating. Hold if not eating  -c/w Admelog moderate correction scale AC and mod HS scale. C/w 2AM scale to correct >250mg/dl if elevated overnight  -Will adjust insulin doses as needed. Expecting BG to improve now that pt is off steroids. Pt very insulin resistant.  Discharge:  Plan to d/c on basal bolus at home doses as A1c close to goal. Pt could benefit from a SGLT2i for DM/weight control/HF and renal protection if not contraindicated.   Outpt. endo follow-up.   Outpt. optho, podiatry, nephrology  Make sure pt has Rx for all DM supplies and insulin/ DM meds.

## 2022-10-25 NOTE — PROGRESS NOTE ADULT - ASSESSMENT
68 y/o F w/h/o fairly controlled Type 2 DM (A1C 7.3%) on high doses of Levemir bid plus Novolog ac meals. DM c/b neuropathy> + Charcot foot, nephropathy, PVD. Also h/o HTN, HLD, pHTN, endometrial cancer s/p MEGAN/SBO, DVT (resolved), spinal stenosis with chronic back pain. Admitted with HF exacerbation and also has + COVID on dexa until yesterday with steroid induced hyperglycemia. Tolerating POs with BG levels improving but still >200s today while off steroids. Will need to wait until steroids are cleared since pt has CKD and might take longer for med to clear. Will continue with present insulin doses for now and adjust as needed to BG goal (100-180mg/dl).

## 2022-10-25 NOTE — PROGRESS NOTE ADULT - PROBLEM SELECTOR PLAN 2
COVID+ on 10/20. Blood in sputum consistent w/ irritation/inflammation 2/2 to this. Patient w/ stable O2 needs (4-6L at home). Continues to endorse wheezing and intermittent cough but stable respiratory stanpoint at baseline O2 requirement saturating in high 90s.     - Last day of remdesivir today  - will hold off Dexamethasone given patient is at baseline respiratory status. COVID+ on 10/20. Blood in sputum consistent w/ irritation/inflammation 2/2 to this. Patient w/ stable O2 needs (4-6L at home). Continues to endorse wheezing and intermittent cough but stable respiratory stanpoint at baseline O2 requirement saturating in high 90s.     - Completed remdesivir  - will hold off Dexamethasone given patient is at baseline respiratory status.

## 2022-10-25 NOTE — PROGRESS NOTE ADULT - PROBLEM SELECTOR PLAN 10
DVT PPx: SubQ heparin  Diet: DASH/CC  GI PPx: Miralax + lactulose PRN (per pt pref)  Dispo: PT recommended [facility] but patient refusing; plan for d/c home with needs.   GOC: Patient states she has advanced directives/will at home. DNR with trial of intubation. Patient's son to bring in paperwork from home

## 2022-10-25 NOTE — PROGRESS NOTE ADULT - SUBJECTIVE AND OBJECTIVE BOX
PROGRESS NOTE:   Authored by Dr. Opal Coker    Patient is a 67y old  Female who presents with a chief complaint of HF exacerbation (24 Oct 2022 16:02)      SUBJECTIVE / OVERNIGHT EVENTS:    ADDITIONAL REVIEW OF SYSTEMS:    MEDICATIONS  (STANDING):  albuterol/ipratropium for Nebulization 3 milliLiter(s) Nebulizer every 6 hours  ammonium lactate 12% Lotion 1 Application(s) Topical every 12 hours  aspirin enteric coated 81 milliGRAM(s) Oral daily  atorvastatin 80 milliGRAM(s) Oral at bedtime  budesonide 160 MICROgram(s)/formoterol 4.5 MICROgram(s) Inhaler 2 Puff(s) Inhalation two times a day  chlorhexidine 2% Cloths 1 Application(s) Topical <User Schedule>  cholecalciferol 2000 Unit(s) Oral daily  dextrose 5%. 1000 milliLiter(s) (100 mL/Hr) IV Continuous <Continuous>  dextrose 5%. 1000 milliLiter(s) (50 mL/Hr) IV Continuous <Continuous>  dextrose 50% Injectable 25 Gram(s) IV Push once  dextrose 50% Injectable 12.5 Gram(s) IV Push once  dextrose 50% Injectable 25 Gram(s) IV Push once  furosemide   Injectable 40 milliGRAM(s) IV Push daily  glucagon  Injectable 1 milliGRAM(s) IntraMuscular once  heparin   Injectable 5000 Unit(s) SubCutaneous every 8 hours  insulin glargine Injectable (LANTUS) 20 Unit(s) SubCutaneous every morning  insulin glargine Injectable (LANTUS) 60 Unit(s) SubCutaneous at bedtime  insulin lispro (ADMELOG) corrective regimen sliding scale   SubCutaneous <User Schedule>  insulin lispro (ADMELOG) corrective regimen sliding scale   SubCutaneous three times a day before meals  insulin lispro Injectable (ADMELOG) 40 Unit(s) SubCutaneous three times a day with meals  insulin lispro Injectable (ADMELOG) 15 Unit(s) SubCutaneous at bedtime  ketotifen 0.025% Ophthalmic Solution 1 Drop(s) Both EYES two times a day  loratadine 10 milliGRAM(s) Oral daily  oxyCODONE  ER Tablet 60 milliGRAM(s) Oral every 12 hours  polyethylene glycol 3350 17 Gram(s) Oral every 12 hours    MEDICATIONS  (PRN):  acetaminophen     Tablet .. 650 milliGRAM(s) Oral every 6 hours PRN Temp greater or equal to 38C (100.4F), Mild Pain (1 - 3)  ALPRAZolam 0.25 milliGRAM(s) Oral once PRN premedication  benzocaine 15 mG/menthol 3.6 mG Lozenge 1 Lozenge Oral daily PRN Sore Throat  dextrose Oral Gel 15 Gram(s) Oral once PRN Blood Glucose LESS THAN 70 milliGRAM(s)/deciliter  lactulose Syrup 20 Gram(s) Oral daily PRN constipation  ondansetron    Tablet 4 milliGRAM(s) Oral every 8 hours PRN Nausea and/or Vomiting  oxyCODONE    IR 10 milliGRAM(s) Oral every 4 hours PRN Severe Pain (7 - 10)      CAPILLARY BLOOD GLUCOSE      POCT Blood Glucose.: 210 mg/dL (25 Oct 2022 07:30)  POCT Blood Glucose.: 226 mg/dL (25 Oct 2022 01:58)  POCT Blood Glucose.: 280 mg/dL (24 Oct 2022 21:58)  POCT Blood Glucose.: 339 mg/dL (24 Oct 2022 17:01)  POCT Blood Glucose.: 406 mg/dL (24 Oct 2022 11:33)    I&O's Summary    24 Oct 2022 07:01  -  25 Oct 2022 07:00  --------------------------------------------------------  IN: 600 mL / OUT: 1500 mL / NET: -900 mL        PHYSICAL EXAM:  Vital Signs Last 24 Hrs  T(C): 36.9 (25 Oct 2022 05:36), Max: 36.9 (25 Oct 2022 05:36)  T(F): 98.5 (25 Oct 2022 05:36), Max: 98.5 (25 Oct 2022 05:36)  HR: 64 (25 Oct 2022 05:36) (64 - 74)  BP: 133/64 (25 Oct 2022 05:36) (118/63 - 147/70)  BP(mean): --  RR: 18 (25 Oct 2022 05:36) (18 - 18)  SpO2: 99% (25 Oct 2022 05:36) (95% - 100%)    Parameters below as of 25 Oct 2022 05:36  Patient On (Oxygen Delivery Method): nasal cannula  O2 Flow (L/min): 5      GENERAL: NAD, lying comfortably in bed  HEAD: Atraumatic, normocephalic  EYES: EOMI b/l, conjunctiva and sclera clear  NECK: Supple, No JVD, No LAD  RESPIRATORY: Normal respiratory effort; lungs are clear to auscultation bilaterally  CARDIOVASCULAR: Regular rate and rhythm, normal S1 and S2, no murmur/rub/gallop; No lower extremity edema  ABDOMEN: Nontender, normoactive bowel sounds, no rebound/guarding; No hepatosplenomegaly  MUSCULOSKELETAL: no clubbing or cyanosis of digits; no joint swelling or tenderness to palpation  NEURO: Non focal   SKIN:   PSYCH: A+O to person, place, and time; affect appropriate    LABS:                          13.3   8.79  )-----------( 236      ( 24 Oct 2022 09:35 )             42.6     10-24    136  |  89<L>  |  60<H>  ----------------------------<  444<H>  4.4   |  33<H>  |  1.61<H>    Ca    9.8      24 Oct 2022 09:35  Phos  3.7     10-24  Mg     2.1     10-24                    RADIOLOGY:    Consulted note reviewed  Reviewed Imaging personally   PROGRESS NOTE:   Authored by Dr. Opal Coker    Patient is a 67y old  Female who presents with a chief complaint of HF exacerbation (24 Oct 2022 16:02)      SUBJECTIVE / OVERNIGHT EVENTS: No acute events overnight. Pt says her cough and wheezing have improved. Says she feels slightly shaky but denies fever or chills.     ADDITIONAL REVIEW OF SYSTEMS:  No fever, chills  No CP, sob  No abd pain, N/V  Having BM without issues      MEDICATIONS  (STANDING):  albuterol/ipratropium for Nebulization 3 milliLiter(s) Nebulizer every 6 hours  ammonium lactate 12% Lotion 1 Application(s) Topical every 12 hours  aspirin enteric coated 81 milliGRAM(s) Oral daily  atorvastatin 80 milliGRAM(s) Oral at bedtime  budesonide 160 MICROgram(s)/formoterol 4.5 MICROgram(s) Inhaler 2 Puff(s) Inhalation two times a day  chlorhexidine 2% Cloths 1 Application(s) Topical <User Schedule>  cholecalciferol 2000 Unit(s) Oral daily  dextrose 5%. 1000 milliLiter(s) (100 mL/Hr) IV Continuous <Continuous>  dextrose 5%. 1000 milliLiter(s) (50 mL/Hr) IV Continuous <Continuous>  dextrose 50% Injectable 25 Gram(s) IV Push once  dextrose 50% Injectable 12.5 Gram(s) IV Push once  dextrose 50% Injectable 25 Gram(s) IV Push once  furosemide   Injectable 40 milliGRAM(s) IV Push daily  glucagon  Injectable 1 milliGRAM(s) IntraMuscular once  heparin   Injectable 5000 Unit(s) SubCutaneous every 8 hours  insulin glargine Injectable (LANTUS) 20 Unit(s) SubCutaneous every morning  insulin glargine Injectable (LANTUS) 60 Unit(s) SubCutaneous at bedtime  insulin lispro (ADMELOG) corrective regimen sliding scale   SubCutaneous <User Schedule>  insulin lispro (ADMELOG) corrective regimen sliding scale   SubCutaneous three times a day before meals  insulin lispro Injectable (ADMELOG) 40 Unit(s) SubCutaneous three times a day with meals  insulin lispro Injectable (ADMELOG) 15 Unit(s) SubCutaneous at bedtime  ketotifen 0.025% Ophthalmic Solution 1 Drop(s) Both EYES two times a day  loratadine 10 milliGRAM(s) Oral daily  oxyCODONE  ER Tablet 60 milliGRAM(s) Oral every 12 hours  polyethylene glycol 3350 17 Gram(s) Oral every 12 hours    MEDICATIONS  (PRN):  acetaminophen     Tablet .. 650 milliGRAM(s) Oral every 6 hours PRN Temp greater or equal to 38C (100.4F), Mild Pain (1 - 3)  ALPRAZolam 0.25 milliGRAM(s) Oral once PRN premedication  benzocaine 15 mG/menthol 3.6 mG Lozenge 1 Lozenge Oral daily PRN Sore Throat  dextrose Oral Gel 15 Gram(s) Oral once PRN Blood Glucose LESS THAN 70 milliGRAM(s)/deciliter  lactulose Syrup 20 Gram(s) Oral daily PRN constipation  ondansetron    Tablet 4 milliGRAM(s) Oral every 8 hours PRN Nausea and/or Vomiting  oxyCODONE    IR 10 milliGRAM(s) Oral every 4 hours PRN Severe Pain (7 - 10)      CAPILLARY BLOOD GLUCOSE      POCT Blood Glucose.: 210 mg/dL (25 Oct 2022 07:30)  POCT Blood Glucose.: 226 mg/dL (25 Oct 2022 01:58)  POCT Blood Glucose.: 280 mg/dL (24 Oct 2022 21:58)  POCT Blood Glucose.: 339 mg/dL (24 Oct 2022 17:01)  POCT Blood Glucose.: 406 mg/dL (24 Oct 2022 11:33)    I&O's Summary    24 Oct 2022 07:01  -  25 Oct 2022 07:00  --------------------------------------------------------  IN: 600 mL / OUT: 1500 mL / NET: -900 mL        PHYSICAL EXAM:  Vital Signs Last 24 Hrs  T(C): 36.9 (25 Oct 2022 05:36), Max: 36.9 (25 Oct 2022 05:36)  T(F): 98.5 (25 Oct 2022 05:36), Max: 98.5 (25 Oct 2022 05:36)  HR: 64 (25 Oct 2022 05:36) (64 - 74)  BP: 133/64 (25 Oct 2022 05:36) (118/63 - 147/70)  BP(mean): --  RR: 18 (25 Oct 2022 05:36) (18 - 18)  SpO2: 99% (25 Oct 2022 05:36) (95% - 100%)    Parameters below as of 25 Oct 2022 05:36  Patient On (Oxygen Delivery Method): nasal cannula  O2 Flow (L/min): 5      GENERAL: NAD, lying comfortably in bed  HEAD: Atraumatic, normocephalic  EYES: Conjunctiva and sclera clear  NECK: Supple  RESPIRATORY: Normal respiratory effort; lungs are clear to auscultation on anterolateral fields; no wheezing  CARDIOVASCULAR: Regular rate and rhythm, normal S1 and S2, no murmur/rub/gallop; No lower extremity pitting edema  ABDOMEN: +distended, mild rigidity, nontender, normoactive bowel sounds, no rebound/guarding  MUSCULOSKELETAL: no gross deformity  NEURO: Non focal   PSYCH: A+O to person, place, and time; affect appropriate    LABS:                          14.0   10.52 )-----------( 270      ( 25 Oct 2022 09:43 )             46.0     10-25    138  |  90<L>  |  58<H>  ----------------------------<  251<H>  3.8   |  35<H>  |  1.63<H>    Ca    10.1      25 Oct 2022 09:42  Phos  3.7     10-25  Mg     2.2     10-25          RADIOLOGY:    Consulted note reviewed  Reviewed Imaging personally

## 2022-10-26 ENCOUNTER — FORM ENCOUNTER (OUTPATIENT)
Age: 67
End: 2022-10-26

## 2022-10-26 ENCOUNTER — TRANSCRIPTION ENCOUNTER (OUTPATIENT)
Age: 67
End: 2022-10-26

## 2022-10-26 VITALS
TEMPERATURE: 98 F | OXYGEN SATURATION: 98 % | RESPIRATION RATE: 18 BRPM | DIASTOLIC BLOOD PRESSURE: 72 MMHG | SYSTOLIC BLOOD PRESSURE: 140 MMHG | HEART RATE: 78 BPM

## 2022-10-26 LAB
ALBUMIN SERPL ELPH-MCNC: 3.6 G/DL — SIGNIFICANT CHANGE UP (ref 3.3–5)
ALP SERPL-CCNC: 117 U/L — SIGNIFICANT CHANGE UP (ref 40–120)
ALT FLD-CCNC: 42 U/L — SIGNIFICANT CHANGE UP (ref 10–45)
ANION GAP SERPL CALC-SCNC: 12 MMOL/L — SIGNIFICANT CHANGE UP (ref 5–17)
AST SERPL-CCNC: 41 U/L — HIGH (ref 10–40)
BILIRUB SERPL-MCNC: 0.8 MG/DL — SIGNIFICANT CHANGE UP (ref 0.2–1.2)
BUN SERPL-MCNC: 67 MG/DL — HIGH (ref 7–23)
CALCIUM SERPL-MCNC: 9.9 MG/DL — SIGNIFICANT CHANGE UP (ref 8.4–10.5)
CHLORIDE SERPL-SCNC: 93 MMOL/L — LOW (ref 96–108)
CO2 SERPL-SCNC: 33 MMOL/L — HIGH (ref 22–31)
CREAT SERPL-MCNC: 1.64 MG/DL — HIGH (ref 0.5–1.3)
EGFR: 34 ML/MIN/1.73M2 — LOW
GLUCOSE BLDC GLUCOMTR-MCNC: 134 MG/DL — HIGH (ref 70–99)
GLUCOSE BLDC GLUCOMTR-MCNC: 161 MG/DL — HIGH (ref 70–99)
GLUCOSE BLDC GLUCOMTR-MCNC: 163 MG/DL — HIGH (ref 70–99)
GLUCOSE BLDC GLUCOMTR-MCNC: 178 MG/DL — HIGH (ref 70–99)
GLUCOSE SERPL-MCNC: 224 MG/DL — HIGH (ref 70–99)
HCT VFR BLD CALC: 47.7 % — HIGH (ref 34.5–45)
HGB BLD-MCNC: 14.6 G/DL — SIGNIFICANT CHANGE UP (ref 11.5–15.5)
MAGNESIUM SERPL-MCNC: 2.2 MG/DL — SIGNIFICANT CHANGE UP (ref 1.6–2.6)
MCHC RBC-ENTMCNC: 27.1 PG — SIGNIFICANT CHANGE UP (ref 27–34)
MCHC RBC-ENTMCNC: 30.6 GM/DL — LOW (ref 32–36)
MCV RBC AUTO: 88.5 FL — SIGNIFICANT CHANGE UP (ref 80–100)
NRBC # BLD: 0 /100 WBCS — SIGNIFICANT CHANGE UP (ref 0–0)
PHOSPHATE SERPL-MCNC: 4.5 MG/DL — SIGNIFICANT CHANGE UP (ref 2.5–4.5)
PLATELET # BLD AUTO: 241 K/UL — SIGNIFICANT CHANGE UP (ref 150–400)
POTASSIUM SERPL-MCNC: 4.5 MMOL/L — SIGNIFICANT CHANGE UP (ref 3.5–5.3)
POTASSIUM SERPL-SCNC: 4.5 MMOL/L — SIGNIFICANT CHANGE UP (ref 3.5–5.3)
PROT SERPL-MCNC: 7.2 G/DL — SIGNIFICANT CHANGE UP (ref 6–8.3)
RBC # BLD: 5.39 M/UL — HIGH (ref 3.8–5.2)
RBC # FLD: 14.6 % — HIGH (ref 10.3–14.5)
SODIUM SERPL-SCNC: 138 MMOL/L — SIGNIFICANT CHANGE UP (ref 135–145)
WBC # BLD: 10.49 K/UL — SIGNIFICANT CHANGE UP (ref 3.8–10.5)
WBC # FLD AUTO: 10.49 K/UL — SIGNIFICANT CHANGE UP (ref 3.8–10.5)

## 2022-10-26 PROCEDURE — 99239 HOSP IP/OBS DSCHRG MGMT >30: CPT

## 2022-10-26 PROCEDURE — 99232 SBSQ HOSP IP/OBS MODERATE 35: CPT

## 2022-10-26 RX ORDER — INSULIN GLARGINE 100 [IU]/ML
20 INJECTION, SOLUTION SUBCUTANEOUS
Qty: 0 | Refills: 0 | DISCHARGE

## 2022-10-26 RX ORDER — FUROSEMIDE 40 MG
1 TABLET ORAL
Qty: 0 | Refills: 0 | DISCHARGE
Start: 2022-10-26

## 2022-10-26 RX ORDER — INSULIN ASPART 100 [IU]/ML
20 INJECTION, SOLUTION SUBCUTANEOUS
Qty: 0 | Refills: 0 | DISCHARGE

## 2022-10-26 RX ORDER — INSULIN GLARGINE 100 [IU]/ML
0 INJECTION, SOLUTION SUBCUTANEOUS
Qty: 0 | Refills: 0 | DISCHARGE

## 2022-10-26 RX ORDER — INSULIN LISPRO 100/ML
20 VIAL (ML) SUBCUTANEOUS
Refills: 0 | Status: DISCONTINUED | OUTPATIENT
Start: 2022-10-26 | End: 2022-10-26

## 2022-10-26 RX ORDER — ISOPROPYL ALCOHOL, BENZOCAINE .7; .06 ML/ML; ML/ML
1 SWAB TOPICAL
Qty: 100 | Refills: 1
Start: 2022-10-26 | End: 2022-12-14

## 2022-10-26 RX ORDER — LORATADINE 10 MG/1
1 TABLET ORAL
Qty: 0 | Refills: 0 | DISCHARGE
Start: 2022-10-26

## 2022-10-26 RX ORDER — LISINOPRIL 2.5 MG/1
1 TABLET ORAL
Qty: 0 | Refills: 0 | DISCHARGE

## 2022-10-26 RX ADMIN — OXYCODONE HYDROCHLORIDE 60 MILLIGRAM(S): 5 TABLET ORAL at 06:19

## 2022-10-26 RX ADMIN — OXYCODONE HYDROCHLORIDE 60 MILLIGRAM(S): 5 TABLET ORAL at 07:01

## 2022-10-26 RX ADMIN — OXYCODONE HYDROCHLORIDE 10 MILLIGRAM(S): 5 TABLET ORAL at 16:39

## 2022-10-26 RX ADMIN — Medication 3 MILLILITER(S): at 06:18

## 2022-10-26 RX ADMIN — Medication 2: at 08:05

## 2022-10-26 RX ADMIN — OXYCODONE HYDROCHLORIDE 10 MILLIGRAM(S): 5 TABLET ORAL at 12:33

## 2022-10-26 RX ADMIN — KETOTIFEN FUMARATE 1 DROP(S): 0.34 SOLUTION OPHTHALMIC at 06:32

## 2022-10-26 RX ADMIN — LORATADINE 10 MILLIGRAM(S): 10 TABLET ORAL at 12:15

## 2022-10-26 RX ADMIN — Medication 81 MILLIGRAM(S): at 12:15

## 2022-10-26 RX ADMIN — BUDESONIDE AND FORMOTEROL FUMARATE DIHYDRATE 2 PUFF(S): 160; 4.5 AEROSOL RESPIRATORY (INHALATION) at 06:20

## 2022-10-26 RX ADMIN — OXYCODONE HYDROCHLORIDE 10 MILLIGRAM(S): 5 TABLET ORAL at 13:03

## 2022-10-26 RX ADMIN — HEPARIN SODIUM 5000 UNIT(S): 5000 INJECTION INTRAVENOUS; SUBCUTANEOUS at 14:05

## 2022-10-26 RX ADMIN — Medication 40 UNIT(S): at 08:05

## 2022-10-26 RX ADMIN — Medication 2: at 16:55

## 2022-10-26 RX ADMIN — Medication 3 MILLILITER(S): at 12:15

## 2022-10-26 RX ADMIN — INSULIN GLARGINE 20 UNIT(S): 100 INJECTION, SOLUTION SUBCUTANEOUS at 08:04

## 2022-10-26 RX ADMIN — Medication 40 MILLIGRAM(S): at 06:19

## 2022-10-26 RX ADMIN — HEPARIN SODIUM 5000 UNIT(S): 5000 INJECTION INTRAVENOUS; SUBCUTANEOUS at 06:20

## 2022-10-26 RX ADMIN — Medication 20 UNIT(S): at 12:14

## 2022-10-26 RX ADMIN — CHLORHEXIDINE GLUCONATE 1 APPLICATION(S): 213 SOLUTION TOPICAL at 06:21

## 2022-10-26 RX ADMIN — Medication 2: at 12:14

## 2022-10-26 RX ADMIN — Medication 2000 UNIT(S): at 12:15

## 2022-10-26 RX ADMIN — Medication 20 UNIT(S): at 16:55

## 2022-10-26 RX ADMIN — OXYCODONE HYDROCHLORIDE 10 MILLIGRAM(S): 5 TABLET ORAL at 17:09

## 2022-10-26 NOTE — DISCHARGE NOTE NURSING/CASE MANAGEMENT/SOCIAL WORK - NSDCVIVACCINE_GEN_ALL_CORE_FT
COVID-19 vaccine, vector-nr, [id number], PF, 0.5 mL (Omar); 16-Mar-2021 13:03; Kimberly Maya (RN); [facility]; [id number] (Exp. Date: 26-May-2021); IntraMuscular; Deltoid Left.; 0.5 milliLiter(s);   COVID-19, mRNA, LNP-S, PF, 100 mcg/ 0.5 mL dose (Moderna); 28-Jan-2022 10:09; Senia Juan (RN); [employer] [id number] (Exp. Date: 14-Apr-2022); IntraMuscular; Deltoid Left.; 0.25 milliLiter(s);   influenza, injectable, quadrivalent, preservative free; 09-Nov-2018 13:40; Mane Camejo (RN); [employer]; [id number] (Exp. Date: 16-Jun-2019); IntraMuscular; Deltoid Left.; 0.5 milliLiter(s); VIS (VIS Published: 07-Aug-2015, VIS Presented: 09-Nov-2018);   influenza, injectable, quadrivalent, preservative free; 15-Nov-2020 12:04; Ramiro Alexander (RN); [employer]; [id number] (Exp. Date: 30-Jun-2021); IntraMuscular; Deltoid Left.; 0.5 milliLiter(s); VIS (VIS Published: 15-Aug-2019, VIS Presented: 15-Nov-2020);   influenza, high-dose, quadrivalent; 28-Jan-2022 11:42; Senia Juan (RN); [employer]; [id number] (Exp. Date: 30-Jun-2022); IntraMuscular; Deltoid Right.; 0.7 milliLiter(s); VIS (VIS Published: 06-Aug-2021, VIS Presented: 28-Jan-2022);   Tdap; 24-Jan-2022 08:46; Lisa Yanes (RN); [employer]; [id number] (Exp. Date: 09-Sep-2023); IntraMuscular; Deltoid Left.; 0.5 milliLiter(s); VIS (VIS Published: 09-May-2013, VIS Presented: 24-Jan-2022);

## 2022-10-26 NOTE — PROVIDER CONTACT NOTE (OTHER) - SITUATION
Pt c/o Chest pain 10/10
Patient has had elevated blood glucose today. Now is hyperglycemic with FS of 432 prior to lunch.
Patient is hyperglycemic to 524 on CMP sent to the lab. We checked patient finger stick to be 468.
Tele event: PAT HR up to 150 X 4.4 seconds. Patient C/O mild palpitations.
according to tele monitors patient had vfib/vtach recording for a moment, however when going into patients room she was awake [id number].

## 2022-10-26 NOTE — PROGRESS NOTE ADULT - REASON FOR ADMISSION
HF exacerbation

## 2022-10-26 NOTE — PROGRESS NOTE ADULT - SUBJECTIVE AND OBJECTIVE BOX
PROGRESS NOTE:   Authored by Dr. Opal Coker    Patient is a 67y old  Female who presents with a chief complaint of HF exacerbation (25 Oct 2022 15:58)      SUBJECTIVE / OVERNIGHT EVENTS:    ADDITIONAL REVIEW OF SYSTEMS:    MEDICATIONS  (STANDING):  albuterol/ipratropium for Nebulization 3 milliLiter(s) Nebulizer every 6 hours  ammonium lactate 12% Lotion 1 Application(s) Topical every 12 hours  aspirin enteric coated 81 milliGRAM(s) Oral daily  atorvastatin 80 milliGRAM(s) Oral at bedtime  budesonide 160 MICROgram(s)/formoterol 4.5 MICROgram(s) Inhaler 2 Puff(s) Inhalation two times a day  chlorhexidine 2% Cloths 1 Application(s) Topical <User Schedule>  cholecalciferol 2000 Unit(s) Oral daily  dextrose 5%. 1000 milliLiter(s) (100 mL/Hr) IV Continuous <Continuous>  dextrose 5%. 1000 milliLiter(s) (50 mL/Hr) IV Continuous <Continuous>  dextrose 50% Injectable 25 Gram(s) IV Push once  dextrose 50% Injectable 12.5 Gram(s) IV Push once  dextrose 50% Injectable 25 Gram(s) IV Push once  furosemide    Tablet 40 milliGRAM(s) Oral daily  glucagon  Injectable 1 milliGRAM(s) IntraMuscular once  heparin   Injectable 5000 Unit(s) SubCutaneous every 8 hours  insulin glargine Injectable (LANTUS) 60 Unit(s) SubCutaneous at bedtime  insulin glargine Injectable (LANTUS) 20 Unit(s) SubCutaneous every morning  insulin lispro (ADMELOG) corrective regimen sliding scale   SubCutaneous three times a day before meals  insulin lispro (ADMELOG) corrective regimen sliding scale   SubCutaneous <User Schedule>  insulin lispro Injectable (ADMELOG) 15 Unit(s) SubCutaneous at bedtime  insulin lispro Injectable (ADMELOG) 40 Unit(s) SubCutaneous three times a day with meals  ketotifen 0.025% Ophthalmic Solution 1 Drop(s) Both EYES two times a day  loratadine 10 milliGRAM(s) Oral daily  oxyCODONE  ER Tablet 60 milliGRAM(s) Oral every 12 hours  polyethylene glycol 3350 17 Gram(s) Oral every 12 hours    MEDICATIONS  (PRN):  acetaminophen     Tablet .. 650 milliGRAM(s) Oral every 6 hours PRN Temp greater or equal to 38C (100.4F), Mild Pain (1 - 3)  ALPRAZolam 0.25 milliGRAM(s) Oral once PRN premedication  benzocaine 15 mG/menthol 3.6 mG Lozenge 1 Lozenge Oral daily PRN Sore Throat  dextrose Oral Gel 15 Gram(s) Oral once PRN Blood Glucose LESS THAN 70 milliGRAM(s)/deciliter  lactulose Syrup 20 Gram(s) Oral daily PRN constipation  ondansetron    Tablet 4 milliGRAM(s) Oral every 8 hours PRN Nausea and/or Vomiting  oxyCODONE    IR 10 milliGRAM(s) Oral every 4 hours PRN Severe Pain (7 - 10)      CAPILLARY BLOOD GLUCOSE      POCT Blood Glucose.: 134 mg/dL (26 Oct 2022 02:11)  POCT Blood Glucose.: 157 mg/dL (25 Oct 2022 21:40)  POCT Blood Glucose.: 135 mg/dL (25 Oct 2022 17:26)  POCT Blood Glucose.: 105 mg/dL (25 Oct 2022 16:15)  POCT Blood Glucose.: 200 mg/dL (25 Oct 2022 11:42)  POCT Blood Glucose.: 210 mg/dL (25 Oct 2022 07:30)    I&O's Summary    25 Oct 2022 07:01  -  26 Oct 2022 07:00  --------------------------------------------------------  IN: 600 mL / OUT: 2000 mL / NET: -1400 mL        PHYSICAL EXAM:  Vital Signs Last 24 Hrs  T(C): 36.7 (26 Oct 2022 05:31), Max: 36.9 (25 Oct 2022 16:48)  T(F): 98.1 (26 Oct 2022 05:31), Max: 98.5 (25 Oct 2022 16:48)  HR: 69 (26 Oct 2022 05:31) (69 - 84)  BP: 139/62 (26 Oct 2022 05:31) (106/61 - 139/62)  BP(mean): --  RR: 18 (26 Oct 2022 05:31) (18 - 18)  SpO2: 97% (26 Oct 2022 05:31) (97% - 99%)    Parameters below as of 26 Oct 2022 05:31  Patient On (Oxygen Delivery Method): nasal cannula  O2 Flow (L/min): 5      GENERAL: NAD, lying comfortably in bed  HEAD: Atraumatic, normocephalic  EYES: EOMI b/l, conjunctiva and sclera clear  NECK: Supple, No JVD, No LAD  RESPIRATORY: Normal respiratory effort; lungs are clear to auscultation bilaterally  CARDIOVASCULAR: Regular rate and rhythm, normal S1 and S2, no murmur/rub/gallop; No lower extremity edema  ABDOMEN: Nontender, normoactive bowel sounds, no rebound/guarding; No hepatosplenomegaly  MUSCULOSKELETAL: no clubbing or cyanosis of digits; no joint swelling or tenderness to palpation  NEURO: Non focal   SKIN:   PSYCH: A+O to person, place, and time; affect appropriate    LABS:                          14.0   10.52 )-----------( 270      ( 25 Oct 2022 09:43 )             46.0     10-25    138  |  90<L>  |  58<H>  ----------------------------<  251<H>  3.8   |  35<H>  |  1.63<H>    Ca    10.1      25 Oct 2022 09:42  Phos  3.7     10-25  Mg     2.2     10-25                    RADIOLOGY:    Consulted note reviewed  Reviewed Imaging personally   PROGRESS NOTE:   Authored by Dr. Opal Coker    Patient is a 67y old  Female who presents with a chief complaint of HF exacerbation (25 Oct 2022 15:58)    SUBJECTIVE / OVERNIGHT EVENTS: No events overnight. Patient continues to endorse cough and wheezing but says they are much improved compared to before. Patient is okay with the plan for discharge home today, but asks for services to be set up at home. No other complaints.     ADDITIONAL REVIEW OF SYSTEMS:  Dyspnea, cough, wheezing all improved.         MEDICATIONS  (STANDING):  albuterol/ipratropium for Nebulization 3 milliLiter(s) Nebulizer every 6 hours  ammonium lactate 12% Lotion 1 Application(s) Topical every 12 hours  aspirin enteric coated 81 milliGRAM(s) Oral daily  atorvastatin 80 milliGRAM(s) Oral at bedtime  budesonide 160 MICROgram(s)/formoterol 4.5 MICROgram(s) Inhaler 2 Puff(s) Inhalation two times a day  chlorhexidine 2% Cloths 1 Application(s) Topical <User Schedule>  cholecalciferol 2000 Unit(s) Oral daily  dextrose 5%. 1000 milliLiter(s) (100 mL/Hr) IV Continuous <Continuous>  dextrose 5%. 1000 milliLiter(s) (50 mL/Hr) IV Continuous <Continuous>  dextrose 50% Injectable 25 Gram(s) IV Push once  dextrose 50% Injectable 12.5 Gram(s) IV Push once  dextrose 50% Injectable 25 Gram(s) IV Push once  furosemide    Tablet 40 milliGRAM(s) Oral daily  glucagon  Injectable 1 milliGRAM(s) IntraMuscular once  heparin   Injectable 5000 Unit(s) SubCutaneous every 8 hours  insulin glargine Injectable (LANTUS) 60 Unit(s) SubCutaneous at bedtime  insulin glargine Injectable (LANTUS) 20 Unit(s) SubCutaneous every morning  insulin lispro (ADMELOG) corrective regimen sliding scale   SubCutaneous three times a day before meals  insulin lispro (ADMELOG) corrective regimen sliding scale   SubCutaneous <User Schedule>  insulin lispro Injectable (ADMELOG) 15 Unit(s) SubCutaneous at bedtime  insulin lispro Injectable (ADMELOG) 40 Unit(s) SubCutaneous three times a day with meals  ketotifen 0.025% Ophthalmic Solution 1 Drop(s) Both EYES two times a day  loratadine 10 milliGRAM(s) Oral daily  oxyCODONE  ER Tablet 60 milliGRAM(s) Oral every 12 hours  polyethylene glycol 3350 17 Gram(s) Oral every 12 hours    MEDICATIONS  (PRN):  acetaminophen     Tablet .. 650 milliGRAM(s) Oral every 6 hours PRN Temp greater or equal to 38C (100.4F), Mild Pain (1 - 3)  ALPRAZolam 0.25 milliGRAM(s) Oral once PRN premedication  benzocaine 15 mG/menthol 3.6 mG Lozenge 1 Lozenge Oral daily PRN Sore Throat  dextrose Oral Gel 15 Gram(s) Oral once PRN Blood Glucose LESS THAN 70 milliGRAM(s)/deciliter  lactulose Syrup 20 Gram(s) Oral daily PRN constipation  ondansetron    Tablet 4 milliGRAM(s) Oral every 8 hours PRN Nausea and/or Vomiting  oxyCODONE    IR 10 milliGRAM(s) Oral every 4 hours PRN Severe Pain (7 - 10)      CAPILLARY BLOOD GLUCOSE      POCT Blood Glucose.: 134 mg/dL (26 Oct 2022 02:11)  POCT Blood Glucose.: 157 mg/dL (25 Oct 2022 21:40)  POCT Blood Glucose.: 135 mg/dL (25 Oct 2022 17:26)  POCT Blood Glucose.: 105 mg/dL (25 Oct 2022 16:15)  POCT Blood Glucose.: 200 mg/dL (25 Oct 2022 11:42)  POCT Blood Glucose.: 210 mg/dL (25 Oct 2022 07:30)    I&O's Summary    25 Oct 2022 07:01  -  26 Oct 2022 07:00  --------------------------------------------------------  IN: 600 mL / OUT: 2000 mL / NET: -1400 mL        PHYSICAL EXAM:  Vital Signs Last 24 Hrs  T(C): 36.7 (26 Oct 2022 05:31), Max: 36.9 (25 Oct 2022 16:48)  T(F): 98.1 (26 Oct 2022 05:31), Max: 98.5 (25 Oct 2022 16:48)  HR: 69 (26 Oct 2022 05:31) (69 - 84)  BP: 139/62 (26 Oct 2022 05:31) (106/61 - 139/62)  BP(mean): --  RR: 18 (26 Oct 2022 05:31) (18 - 18)  SpO2: 97% (26 Oct 2022 05:31) (97% - 99%)    Parameters below as of 26 Oct 2022 05:31  Patient On (Oxygen Delivery Method): nasal cannula  O2 Flow (L/min): 5      GENERAL: NAD, lying comfortably in bed  HEAD: Atraumatic, normocephalic  EYES: EOMI b/l, conjunctiva and sclera clear  NECK: Supple, No JVD, No LAD  RESPIRATORY: Normal respiratory effort; lungs are clear to auscultation bilaterally  CARDIOVASCULAR: Regular rate and rhythm, normal S1 and S2, no murmur/rub/gallop; No lower extremity edema  ABDOMEN: Nontender, normoactive bowel sounds, no rebound/guarding; No hepatosplenomegaly  MUSCULOSKELETAL: no clubbing or cyanosis of digits; no joint swelling or tenderness to palpation  NEURO: Non focal   SKIN:   PSYCH: A+O to person, place, and time; affect appropriate    LABS:                          14.0   10.52 )-----------( 270      ( 25 Oct 2022 09:43 )             46.0     10-25    138  |  90<L>  |  58<H>  ----------------------------<  251<H>  3.8   |  35<H>  |  1.63<H>    Ca    10.1      25 Oct 2022 09:42  Phos  3.7     10-25  Mg     2.2     10-25                    RADIOLOGY:    Consulted note reviewed  Reviewed Imaging personally   PROGRESS NOTE:   Authored by Dr. Opal Coker    Patient is a 67y old  Female who presents with a chief complaint of HF exacerbation (25 Oct 2022 15:58)    SUBJECTIVE / OVERNIGHT EVENTS: No events overnight. Patient continues to endorse cough and wheezing but says they are much improved compared to before. Patient is okay with the plan for discharge home today, but asks for services to be set up at home. No other complaints.     ADDITIONAL REVIEW OF SYSTEMS:  Dyspnea, cough, wheezing all improved.         MEDICATIONS  (STANDING):  albuterol/ipratropium for Nebulization 3 milliLiter(s) Nebulizer every 6 hours  ammonium lactate 12% Lotion 1 Application(s) Topical every 12 hours  aspirin enteric coated 81 milliGRAM(s) Oral daily  atorvastatin 80 milliGRAM(s) Oral at bedtime  budesonide 160 MICROgram(s)/formoterol 4.5 MICROgram(s) Inhaler 2 Puff(s) Inhalation two times a day  chlorhexidine 2% Cloths 1 Application(s) Topical <User Schedule>  cholecalciferol 2000 Unit(s) Oral daily  dextrose 5%. 1000 milliLiter(s) (100 mL/Hr) IV Continuous <Continuous>  dextrose 5%. 1000 milliLiter(s) (50 mL/Hr) IV Continuous <Continuous>  dextrose 50% Injectable 25 Gram(s) IV Push once  dextrose 50% Injectable 12.5 Gram(s) IV Push once  dextrose 50% Injectable 25 Gram(s) IV Push once  furosemide    Tablet 40 milliGRAM(s) Oral daily  glucagon  Injectable 1 milliGRAM(s) IntraMuscular once  heparin   Injectable 5000 Unit(s) SubCutaneous every 8 hours  insulin glargine Injectable (LANTUS) 60 Unit(s) SubCutaneous at bedtime  insulin glargine Injectable (LANTUS) 20 Unit(s) SubCutaneous every morning  insulin lispro (ADMELOG) corrective regimen sliding scale   SubCutaneous three times a day before meals  insulin lispro (ADMELOG) corrective regimen sliding scale   SubCutaneous <User Schedule>  insulin lispro Injectable (ADMELOG) 15 Unit(s) SubCutaneous at bedtime  insulin lispro Injectable (ADMELOG) 40 Unit(s) SubCutaneous three times a day with meals  ketotifen 0.025% Ophthalmic Solution 1 Drop(s) Both EYES two times a day  loratadine 10 milliGRAM(s) Oral daily  oxyCODONE  ER Tablet 60 milliGRAM(s) Oral every 12 hours  polyethylene glycol 3350 17 Gram(s) Oral every 12 hours    MEDICATIONS  (PRN):  acetaminophen     Tablet .. 650 milliGRAM(s) Oral every 6 hours PRN Temp greater or equal to 38C (100.4F), Mild Pain (1 - 3)  ALPRAZolam 0.25 milliGRAM(s) Oral once PRN premedication  benzocaine 15 mG/menthol 3.6 mG Lozenge 1 Lozenge Oral daily PRN Sore Throat  dextrose Oral Gel 15 Gram(s) Oral once PRN Blood Glucose LESS THAN 70 milliGRAM(s)/deciliter  lactulose Syrup 20 Gram(s) Oral daily PRN constipation  ondansetron    Tablet 4 milliGRAM(s) Oral every 8 hours PRN Nausea and/or Vomiting  oxyCODONE    IR 10 milliGRAM(s) Oral every 4 hours PRN Severe Pain (7 - 10)      CAPILLARY BLOOD GLUCOSE      POCT Blood Glucose.: 134 mg/dL (26 Oct 2022 02:11)  POCT Blood Glucose.: 157 mg/dL (25 Oct 2022 21:40)  POCT Blood Glucose.: 135 mg/dL (25 Oct 2022 17:26)  POCT Blood Glucose.: 105 mg/dL (25 Oct 2022 16:15)  POCT Blood Glucose.: 200 mg/dL (25 Oct 2022 11:42)  POCT Blood Glucose.: 210 mg/dL (25 Oct 2022 07:30)    I&O's Summary    25 Oct 2022 07:01  -  26 Oct 2022 07:00  --------------------------------------------------------  IN: 600 mL / OUT: 2000 mL / NET: -1400 mL        PHYSICAL EXAM:  Vital Signs Last 24 Hrs  T(C): 36.7 (26 Oct 2022 05:31), Max: 36.9 (25 Oct 2022 16:48)  T(F): 98.1 (26 Oct 2022 05:31), Max: 98.5 (25 Oct 2022 16:48)  HR: 69 (26 Oct 2022 05:31) (69 - 84)  BP: 139/62 (26 Oct 2022 05:31) (106/61 - 139/62)  BP(mean): --  RR: 18 (26 Oct 2022 05:31) (18 - 18)  SpO2: 97% (26 Oct 2022 05:31) (97% - 99%)    Parameters below as of 26 Oct 2022 05:31  Patient On (Oxygen Delivery Method): nasal cannula  O2 Flow (L/min): 5      GENERAL: NAD, lying comfortably in bed  HEAD: Atraumatic, normocephalic  EYES: Conjunctiva and sclera clear  NECK: Supple   RESPIRATORY: Normal respiratory effort; lungs are clear to auscultation on limited anterolateral field without wheezing  CARDIOVASCULAR: Regular rate and rhythm, normal S1 and S2, no murmur/rub/gallop; No lower extremity edema  ABDOMEN: Distended abdomen, nontender, normoactive bowel sounds, no rebound/guarding   MUSCULOSKELETAL: no gross deformity. limited mobility at baseline given respiratory status/obesity  NEURO: Non focal   PSYCH: A+O to person, place, and time; affect appropriate    LABS:                        14.6   10.49 )-----------( 241      ( 26 Oct 2022 10:11 )             47.7     10-26    138  |  93<L>  |  67<H>  ----------------------------<  224<H>  4.5   |  33<H>  |  1.64<H>    Ca    9.9      26 Oct 2022 10:11  Phos  4.5     10-26  Mg     2.2     10-26    TPro  7.2  /  Alb  3.6  /  TBili  0.8  /  DBili  x   /  AST  41<H>  /  ALT  42  /  AlkPhos  117  10-26                    RADIOLOGY:    Consulted note reviewed  Reviewed Imaging personally

## 2022-10-26 NOTE — PROGRESS NOTE ADULT - SUBJECTIVE AND OBJECTIVE BOX
Diabetes Follow up note:    Chief complaint: T2DM     Interval Hx: BG values at goal over past 24 hours. Pt seen at bedside. Reports feeling ok today, has concerns about going home as she is unable to care for herself. Clarified with pt was on Toujeo 80/84 at home and Novolog 22 units w/meals. Requiring less insulin since decadron effect no longer in effect. Tolerating POs and endorsing good appetite.     Review of Systems:  General: + weakness  GI: Tolerating POs. Denies N/V/D/Abd pain  CV: Denies CP/SOB  ENDO: No S&Sx of hypoglycemia  MEDS:  atorvastatin 80 milliGRAM(s) Oral at bedtime    insulin glargine Injectable (LANTUS) 20 Unit(s) SubCutaneous every morning  insulin glargine Injectable (LANTUS) 60 Unit(s) SubCutaneous at bedtime  insulin lispro (ADMELOG) corrective regimen sliding scale   SubCutaneous three times a day before meals  insulin lispro (ADMELOG) corrective regimen sliding scale   SubCutaneous <User Schedule>  insulin lispro Injectable (ADMELOG) 20 Unit(s) SubCutaneous three times a day with meals  insulin lispro Injectable (ADMELOG) 15 Unit(s) SubCutaneous at bedtime      Allergies    adhesives (Rash)  Bactrim (Flushing)  latex (Rash)  wool- rash, itch (Other)        PE:  General: Female lying in bed. NAD>   Vital Signs Last 24 Hrs  T(C): 36.7 (26 Oct 2022 05:31), Max: 36.9 (25 Oct 2022 16:48)  T(F): 98.1 (26 Oct 2022 05:31), Max: 98.5 (25 Oct 2022 16:48)  HR: 69 (26 Oct 2022 05:31) (69 - 84)  BP: 139/62 (26 Oct 2022 05:31) (106/61 - 139/62)  BP(mean): --  RR: 18 (26 Oct 2022 05:31) (18 - 18)  SpO2: 97% (26 Oct 2022 05:31) (97% - 99%)    Parameters below as of 26 Oct 2022 05:31  Patient On (Oxygen Delivery Method): nasal cannula  O2 Flow (L/min): 5    Abd: Soft, NT,ND, Obese.   Extremities: Warm. Warm, + edema in LEs with chronic vascular changes including darker skin R charcot foot  Neuro: A&O X3    LABS:  POCT Blood Glucose.: 163 mg/dL (10-26-22 @ 11:29)  POCT Blood Glucose.: 178 mg/dL (10-26-22 @ 07:58)  POCT Blood Glucose.: 134 mg/dL (10-26-22 @ 02:11)  POCT Blood Glucose.: 157 mg/dL (10-25-22 @ 21:40)  POCT Blood Glucose.: 135 mg/dL (10-25-22 @ 17:26)  POCT Blood Glucose.: 105 mg/dL (10-25-22 @ 16:15)  POCT Blood Glucose.: 200 mg/dL (10-25-22 @ 11:42)  POCT Blood Glucose.: 210 mg/dL (10-25-22 @ 07:30)  POCT Blood Glucose.: 226 mg/dL (10-25-22 @ 01:58)  POCT Blood Glucose.: 280 mg/dL (10-24-22 @ 21:58)  POCT Blood Glucose.: 339 mg/dL (10-24-22 @ 17:01)  POCT Blood Glucose.: 406 mg/dL (10-24-22 @ 11:33)  POCT Blood Glucose.: 328 mg/dL (10-24-22 @ 07:40)  POCT Blood Glucose.: 385 mg/dL (10-23-22 @ 21:31)  POCT Blood Glucose.: 427 mg/dL (10-23-22 @ 16:09)  POCT Blood Glucose.: 413 mg/dL (10-23-22 @ 11:54)                            14.6   10.49 )-----------( 241      ( 26 Oct 2022 10:11 )             47.7       10-26    138  |  93<L>  |  67<H>  ----------------------------<  224<H>  4.5   |  33<H>  |  1.64<H>    eGFR: 34<L>    Ca    9.9      10-26  Mg     2.2     10-26  Phos  4.5     10-26    TPro  7.2  /  Alb  3.6  /  TBili  0.8  /  DBili  x   /  AST  41<H>  /  ALT  42  /  AlkPhos  117  10-26       A1C with Estimated Average Glucose Result: 7.3 % (10-13-22 @ 11:47)          Contact number: mario [phone] or [phone]

## 2022-10-26 NOTE — PROGRESS NOTE ADULT - PROBLEM SELECTOR PROBLEM 1
Uncontrolled type 2 diabetes mellitus with hyperglycemia, with long-term current use of insulin
Acute on chronic respiratory failure with hypoxia
Uncontrolled type 2 diabetes mellitus with hyperglycemia, with long-term current use of insulin
Acute on chronic respiratory failure with hypoxia
Uncontrolled type 2 diabetes mellitus with hyperglycemia, with long-term current use of insulin
Acute on chronic respiratory failure with hypoxia

## 2022-10-26 NOTE — PROVIDER CONTACT NOTE (OTHER) - ACTION/TREATMENT ORDERED:
EKG ordered and done. Standing Oxycontin 60mg PO given.  No other interventions at this time. Will continue to monitor.
MD notified asked what rhythm patient currently in and it was normal sinus 79. MD also stated he will relay information to day team. Tele recording will be placed in patients paper binder.
Team Opal Ugalde notified.
Lantus increased. Will increase premeal insulin as well.
stat dose 8 units of insulin. Recheck fs before dinner and administer increase pre meal of 12 units and 12 units correctional scale for sugar of 500 at dinner time. Recheck after at bedtime as ordered

## 2022-10-26 NOTE — PROGRESS NOTE ADULT - PROBLEM SELECTOR PLAN 2
COVID+ on 10/20. Blood in sputum consistent w/ irritation/inflammation 2/2 to this. Patient w/ stable O2 needs (4-6L at home). Continues to endorse wheezing and intermittent cough but stable respiratory stanpoint at baseline O2 requirement saturating in high 90s.     - Completed remdesivir  - will hold off Dexamethasone given patient is at baseline respiratory status.

## 2022-10-26 NOTE — PROGRESS NOTE ADULT - PROBLEM SELECTOR PLAN 3
Home: Toujeo 80 units AM, 84 units PM + Novolog 20 qAC  Hyperglycemic to 500+ likely exacerbated by dexamethasone started 10/21.   Glucose in the 200s now.   - Endo consulted. Recs for 20 U Lantus AM, 60 U Lantus at bedtime, Admelog 36 U premeal, Admelog 15 U at bedtime.   - Increased lantus qHS from 42->45U--> 50 -> 60 -> 80  - Increased premeal insulin from 4->12U-> 15 -> 36, will titrate back down as pt now off dex  - Moderate dose correctional scale  - Endo recs appreciated; plan for discharging patient home on home regimen given good control (A1c 7.3) Home: Toujeo 80 units AM, 84 units PM + Novolog 20 qAC  Hyperglycemic to 500+ likely exacerbated by dexamethasone started 10/21.   Glucose in the 100s now after stopping dexamehtasone.   - Endo followed course of hospitalization;      Increased lantus qHS from 42->45U--> 50 -> 60 -> 80       Increased premeal insulin from 4->12U-> 15 -> 36 then back down to 20  - Moderate dose correctional scale  - Plan for discharging patient home today on 60 U Tuojeo BID and Novolog 20 premeal (A1c 7.3)

## 2022-10-26 NOTE — CHART NOTE - NSCHARTNOTEFT_GEN_A_CORE
Patient Name: Yanet PerezBirth Date: 1955  Address: [address] [place] [address]  [place], [place] [address]Dzn: Female  Rx Written	Rx Dispensed	Drug	Quantity	Days Supply	Prescriber Name	Prescriber Demetria #	Payment Method	Dispenser  01/30/2022	01/31/2022	methadone hcl 10 mg tablet	60	15	Manjeet Mcclure	[id number]	Insurance	[facility]  01/29/2022	01/29/2022	oxycodone hcl (ir) 5 mg tablet	30	7	Marni Lambert	[id number]	Insurance	[facility]    Patient Name: Yanet PerezBirth Date: 1955  Address:  [address] [place], [place] [address]Wjv: Female  Rx Written	Rx Dispensed	Drug	Quantity	Days Supply	Prescriber Name	Prescriber Demetria #	Payment Method	Dispenser  09/19/2022	09/22/2022	oxycontin er 60 mg tablet	60	30	ZolliPaula MD	[id number]	Medicare	[id number]  08/22/2022	08/24/2022	oxycontin er 60 mg tablet	60	30	ZolliPaula MD	[id number]	Medicare	[id number]  07/20/2022	07/26/2022	oxycontin er 60 mg tablet	60	30	ZolliPaula MD	[id number]	Medicare	[id number]  06/24/2022	06/27/2022	oxycontin er 60 mg tablet	60	30	ZolliPaula MD	[id number]	Medicare	[id number]  05/25/2022	05/29/2022	oxycontin er 60 mg tablet	60	30	ZolliPaula MD	[id number]	Medicare	[id number]  04/27/2022	05/01/2022	oxycontin er 60 mg tablet	60	30	ZolliPaula MD	[id number]	Medicare	[id number]  03/29/2022	04/02/2022	oxycontin er 60 mg tablet	60	30	ZolliPaula MD	[id number]	Medicare	[id number]  02/24/2022	03/04/2022	oxycontin er 60 mg tablet	60	30	ZolliPaula MD	[id number]	Medicare	[id number]  02/03/2022	02/03/2022	oxycontin er 80 mg tablet	60	30	ZolPaula elise MD	[id number]	Insurance	[id number]  01/14/2022	01/17/2022	clonazepam 0.5 mg tablet	30	30	ZolPaula elise MD	[id number]	Medicare	[id number]  12/15/2021	12/29/2021	oxycontin er 80 mg tablet	60	30	ZolPaula elise MD	[id number]	Medicare	[id number]  12/14/2021	12/15/2021	clonazepam 0.5 mg tablet	30	30	ZolPaula elise MD	[id number]	Medicare	[id number]  12/15/2021	12/15/2021	hydrocodone-acetaminophen 7.5-325 mg tablet	90	30	ZolPaula elise MD	[id number]	Medicare	[id number]  11/29/2021	12/01/2021	oxycontin er 80 mg tablet	50	30	ZolPaula elise MD	[id number]	Medicare	[id number]  11/29/2021	11/30/2021	oxycontin er 80 mg tablet	10	5	ZolPaula elise MD	[id number]	Medicare	[id number]  11/15/2021	11/16/2021	clonazepam 0.5 mg tablet	30	30	ZolPaula elise MD	[id number]	Medicare	[id number]  11/15/2021	11/16/2021	hydrocodone-acetaminophen 7.5-325 mg tablet	90	30	ZolPaula elise MD	[id number]	Medicare	[id number]  11/01/2021	11/01/2021	oxycontin er 80 mg tablet	60	30	ZolPaula elise MD	[id number]	Medicare	[id number]

## 2022-10-26 NOTE — DISCHARGE NOTE NURSING/CASE MANAGEMENT/SOCIAL WORK - NSDCFUADDAPPT_GEN_ALL_CORE_FT
Please schedule follow-up appointments with your PCP, Dr. Mccann, and your cardiologist, Dr. Gary.     Please also schedule follow up with an endocrinologist (clinic # provided), and a nephrologist (kidney doctor).

## 2022-10-26 NOTE — PROGRESS NOTE ADULT - PROVIDER SPECIALTY LIST ADULT
Endocrinology
Wound Care
Wound Care
Podiatry
Internal Medicine
Endocrinology
Internal Medicine
Endocrinology
Internal Medicine

## 2022-10-26 NOTE — PROGRESS NOTE ADULT - PROBLEM SELECTOR PLAN 3
c/w Atorvastatin 80mg QHS  f/u lipids as outpt      discussed w/pt and team  Can be reached via TEAMS/pager: [phone]   office:  [phone] (M-F 9a-5pm)               [phone] (nights/weekends)   [email or web address] password [email or web address]

## 2022-10-26 NOTE — PROGRESS NOTE ADULT - PROBLEM SELECTOR PLAN 4
Patient w/ chronic lower back and leg pain 2/2 spinal stenosis. Largely bedbound at baseline. Has oxycontin 60mg Q12H at home, per patient recently dc'ed short acting meds upon discussion w/ her internist as she did not want to feel drowsy.  - continue home oxycontin 60mg Q12H  - oxy 10mg PO Q4H PRN for breakthrough pain Patient w/ chronic lower back and leg pain 2/2 spinal stenosis. Largely bedbound at baseline. Has oxycontin 60mg Q12H at home, per patient recently dc'ed short acting meds upon discussion w/ her internist as she did not want to feel drowsy.    - continue home oxycontin 60mg Q12H after d/c  - oxy 10mg PO Q4H PRN for breakthrough pain

## 2022-10-26 NOTE — PROVIDER CONTACT NOTE (OTHER) - BACKGROUND
Glucose has been elevated throughout shift.
Patient admitted with chf exacerbation.
Dx HF  Hx CKD, Obesity, DVT, HTN, Cervical stenosis, DM2, CKD3, Spinal stenosis.
Patient admitted with heart failure
unknown

## 2022-10-26 NOTE — PROGRESS NOTE ADULT - ATTENDING COMMENTS
66F with PMH morbid obesity, moderate persistent asthma, HTN, HLD, DM, CKD3, HFpEF on 4-6L NC oxygen at baseline (not on BiPAP/CPAP qhs), presenting with SOB/LEGER most consistent w/ heart failure exacerbation now improving with Lasix 40IV bid, course c.b COVID on Remdesevir and Dexamethasone, and hyperglycemia iso Dexa tx.     #Acute on chronic hypoxic respiratory failure  #COVID PNA  #Hyperglycemia/DM2  #CKD3  #HFpEF  #pHTN  - fingersticks better controlled  - clinically euvolemic, back to baseline O2 requirements 4-5L NC  - on diuresis with lasix, on PO lasix  - cardiology recs appreciated, will consider starting SGTL2 inhibitor but my concern is that patient is mostly bedbound, and may have increased risk of infection, may re-consider outpatient   - c/w CPAP/BIPAP at night to assist with OHS   - discharge ready today, transport set up at 4pm

## 2022-10-26 NOTE — PROGRESS NOTE ADULT - NUTRITIONAL ASSESSMENT
This patient has been assessed with a concern for Malnutrition and has been determined to have a diagnosis/diagnoses of Morbid obesity (BMI > 40).    This patient is being managed with:   Diet Consistent Carbohydrate w/Evening Snack-  DASH/TLC {Sodium & Cholesterol Restricted} (DASH)  1000mL Fluid Restriction ([id number])  Entered: Oct 12 2022  9:11PM    
This patient has been assessed with a concern for Malnutrition and has been determined to have a diagnosis/diagnoses of Morbid obesity (BMI > 40).    This patient is being managed with:   Diet Consistent Carbohydrate w/Evening Snack-  DASH/TLC {Sodium & Cholesterol Restricted} (DASH)  1000mL Fluid Restriction ([id number])  Entered: Oct 12 2022  9:11PM    
This patient has been assessed with a concern for Malnutrition and has been determined to have a diagnosis/diagnoses of Morbid obesity (BMI > 40).    This patient is being managed with:   Diet Consistent Carbohydrate w/Evening Snack-  DASH/TLC {Sodium & Cholesterol Restricted} (DASH)  1000mL Fluid Restriction ([id number])  Entered: Oct 12 2022  9:11PM    
Diet, Consistent Carbohydrate w/Evening Snack:   DASH/TLC {Sodium & Cholesterol Restricted} (DASH)  1000mL Fluid Restriction ([id number]) (10-12-22 @ 21:17) [Active]
This patient has been assessed with a concern for Malnutrition and has been determined to have a diagnosis/diagnoses of Morbid obesity (BMI > 40).    This patient is being managed with:   Diet Consistent Carbohydrate w/Evening Snack-  DASH/TLC {Sodium & Cholesterol Restricted} (DASH)  1000mL Fluid Restriction ([id number])  Entered: Oct 12 2022  9:11PM    
Diet, Consistent Carbohydrate w/Evening Snack:   DASH/TLC {Sodium & Cholesterol Restricted} (DASH)  1000mL Fluid Restriction ([id number]) (10-12-22 @ 21:17) [Active]      Needs RD consult
This patient has been assessed with a concern for Malnutrition and has been determined to have a diagnosis/diagnoses of Morbid obesity (BMI > 40).    This patient is being managed with:   Diet Consistent Carbohydrate w/Evening Snack-  DASH/TLC {Sodium & Cholesterol Restricted} (DASH)  1000mL Fluid Restriction ([id number])  Entered: Oct 12 2022  9:11PM    
This patient has been assessed with a concern for Malnutrition and has been determined to have a diagnosis/diagnoses of Morbid obesity (BMI > 40).    This patient is being managed with:   Diet Consistent Carbohydrate w/Evening Snack-  DASH/TLC {Sodium & Cholesterol Restricted} (DASH)  1000mL Fluid Restriction ([id number])  Entered: Oct 12 2022  9:11PM    
This patient has been assessed with a concern for Malnutrition and has been determined to have a diagnosis/diagnoses of Morbid obesity (BMI > 40).    This patient is being managed with:   Diet Consistent Carbohydrate w/Evening Snack-  DASH/TLC {Sodium & Cholesterol Restricted} (DASH)  1000mL Fluid Restriction ([id number])  Entered: Oct 12 2022  9:11PM    
This patient has been assessed with a concern for Malnutrition and has been determined to have a diagnosis/diagnoses of Morbid obesity (BMI > 40).    This patient is being managed with:   Diet Consistent Carbohydrate w/Evening Snack-  DASH/TLC {Sodium & Cholesterol Restricted} (DASH)  1000mL Fluid Restriction ([id number])  Entered: Oct 12 2022  9:11PM    
This patient has been assessed with a concern for Malnutrition and has been determined to have a diagnosis/diagnoses of Morbid obesity (BMI > 40).    This patient is being managed with:   Diet Consistent Carbohydrate w/Evening Snack-  DASH/TLC {Sodium & Cholesterol Restricted} (DASH)  1000mL Fluid Restriction ([id number])  Entered: Oct 12 2022  9:11PM    
This patient has been assessed with a concern for Malnutrition and has been determined to have a diagnosis/diagnoses of Morbid obesity (BMI > 40).    This patient is being managed with:   Diet Consistent Carbohydrate w/Evening Snack-  DASH/TLC {Sodium & Cholesterol Restricted} (DASH)  1000mL Fluid Restriction ([id number])  Entered: Oct 12 2022  9:11PM    
This patient has been assessed with a concern for Malnutrition and has been determined to have a diagnosis/diagnoses of Morbid obesity (BMI > 40).    This patient is being managed with:   Diet Consistent Carbohydrate w/Evening Snack-  DASH/TLC {Sodium & Cholesterol Restricted} (DASH)  1000mL Fluid Restriction ([id number])  Entered: Oct 12 2022  9:11PM    
This patient has been assessed with a concern for Malnutrition and has been determined to have a diagnosis/diagnoses of Morbid obesity (BMI > 40).    This patient is being managed with:   Diet Consistent Carbohydrate w/Evening Snack-  DASH/TLC {Sodium & Cholesterol Restricted} (DASH)  1000mL Fluid Restriction ([id number])  Entered: Oct 12 2022  9:11PM

## 2022-10-26 NOTE — PROGRESS NOTE ADULT - PROBLEM SELECTOR PLAN 1
Pt w/ dyspnea despite supplemental O2, on 5L NC (home 3-4L NC, uses daily nebulizer). Noted to be anasarcic by family prior to admission. Symptoms improved since admission but now stable. Likely 2/2 HF exacerbation given hx, now complicated by COVID+ as below. Duplex w/o evidence of DVT and d-dimer 339, low suspicion for PE.  - TTE 10/19 w/ EF 55-60%, limited findings due to habitus, unable to discern pHTN (previous noted at [facility] on 11/20)     - appreciate cardiology recs: no RHC iso other issues, consider pulm consult, BIPAP/AVAPS, weight loss     - pCO2 improved today     -  trial AVAPS with low-dose xanax (0.25) failed; pt could not tolerate   - Transition to po lasix 40mg from IV today  - continue duonebs, holding dexamethasone given at baseline oxygen status  - continue symbicort (therapeutic interchange for Advair)  - Daily bed weights, strict I/O, fluid restrict  - Cards consulted for whether patient would benefit from RHC, recommended SGLT2-i but holding off given high risk of infection  - endorses clinical improvement Pt w/ dyspnea despite supplemental O2, on 5L NC (home 3-4L NC, uses daily nebulizer). Noted to be anasarcic by family prior to admission. Symptoms improved since admission but now stable. Likely 2/2 HF exacerbation given hx, now complicated by COVID+ as below. Duplex w/o evidence of DVT and d-dimer 339, low suspicion for PE.  - TTE 10/19 w/ EF 55-60%, limited findings due to habitus, unable to discern pHTN (previous noted at [facility] on 11/20)     - appreciate cardiology recs: no RHC iso other issues, consider pulm consult, BIPAP/AVAPS, weight loss     - pCO2 improved today     -  trial AVAPS with low-dose xanax (0.25) failed; pt could not tolerate   - Transitioned to po lasix 40mg   - continue duonebs, holding dexamethasone given at baseline oxygen status  - continue symbicort (therapeutic interchange for Advair)  - Daily bed weights, strict I/O, fluid restrict  - Cards consulted for whether patient would benefit from RHC, recommended SGLT2-i but holding off given high risk of infection based on her limited mobility  - endorses clinical improvement; patient back to baseline and ready for d/c

## 2022-10-26 NOTE — PROGRESS NOTE ADULT - PROBLEM SELECTOR PLAN 1
-test BG AC/HS/2AM  -c/w Lantus 60 units QHS and Lantus 20 units in AM  -Decrease Admelog 20 units TID w/meals today   -c/w Admelog 15 units w/HS snack if eating. Hold if not eating  -c/w Admelog moderate correction scale AC and mod HS scale. C/w 2AM scale to correct >250mg/dl if elevated overnight  -cons carb diet  Discharge:  Toujeo 60 units BID on discharge  Novolog 22 units w/meals (home dose)  Could benefit from AVT3c-e as outpt. Can discuss w/PCP  Follow up outpt w/PCP Dr Mccann. Would benefit from RD/CDE as outpt  Outpt. optho, podiatry, nephrology  Make sure pt has Rx for all DM supplies and insulin/ DM meds.

## 2022-10-26 NOTE — DISCHARGE NOTE NURSING/CASE MANAGEMENT/SOCIAL WORK - PATIENT PORTAL LINK FT
You can access the FollowMyHealth Patient Portal offered by [facility] by registering at the following website: [email or web address]. By joining [facility]’s FollowMyHealth portal, you will also be able to view your health information using other applications (apps) compatible with our system.

## 2022-10-26 NOTE — PROGRESS NOTE ADULT - ASSESSMENT
66 y/o F w/h/o fairly controlled Type 2 DM (A1C 7.3%) on high doses of Levemir bid plus Novolog ac meals. DM c/b neuropathy> + Charcot foot, nephropathy, PVD. Also h/o HTN, HLD, pHTN, endometrial cancer s/p MEGAN/SBO, DVT (resolved), spinal stenosis with chronic back pain. Admitted with HF exacerbation and also has + COVID on dexa until 10/24 with steroid induced hyperglycemia. BG values now stable off steroids, requiring less insulin from home regimen currently. DC planning ongoing. Discussed adjustment of home meds given likely weight loss while inpatient. Pt understands she will need to closely follow up w/outpt provider. BG goal (100-180mg/dl).

## 2022-10-26 NOTE — PROVIDER CONTACT NOTE (OTHER) - REASON
Pt c/o Chest pain 10/10
Hyperglycemia -  before lunch time
Tele event: PAT HR up to 150 X 4.4 seconds
Hyperglycemia
tele event

## 2022-10-26 NOTE — PROGRESS NOTE ADULT - PROBLEM SELECTOR PLAN 6
- Cr ~1.61 today.  Baseline ~1.6  - Avoid nephrotoxins, dose per GFR - Cr 1.64 today.  Baseline ~1.6  - Avoid nephrotoxins, dose per GFR

## 2022-10-26 NOTE — PROGRESS NOTE ADULT - NSPROGADDITIONALINFOA_GEN_ALL_CORE
-Plan discussed with pt/team.  Contact info: [phone] (24/7). pager [phone]  [email or web address] password [email or web address]  Teams  Spent  28 minutes assessing pt/labs/meds and discussing plan of care with primary team  Adjusting insulin  Discharge plan  Follow up care
28 minutes spent on the development of plan of care/coordination of care/glycemic control through review of labs, blood glucose values and vital signs.
26 minutes spent on the development of plan of care/coordination of care/glycemic control through review of labs, blood glucose values and vital signs.

## 2022-10-27 ENCOUNTER — NON-APPOINTMENT (OUTPATIENT)
Age: 67
End: 2022-10-27

## 2022-10-31 ENCOUNTER — RX RENEWAL (OUTPATIENT)
Age: 67
End: 2022-10-31

## 2022-11-07 ENCOUNTER — RX RENEWAL (OUTPATIENT)
Age: 67
End: 2022-11-07

## 2022-11-08 PROCEDURE — 82330 ASSAY OF CALCIUM: CPT

## 2022-11-08 PROCEDURE — 83036 HEMOGLOBIN GLYCOSYLATED A1C: CPT

## 2022-11-08 PROCEDURE — 80076 HEPATIC FUNCTION PANEL: CPT

## 2022-11-08 PROCEDURE — U0003: CPT

## 2022-11-08 PROCEDURE — 97110 THERAPEUTIC EXERCISES: CPT

## 2022-11-08 PROCEDURE — 84295 ASSAY OF SERUM SODIUM: CPT

## 2022-11-08 PROCEDURE — 84132 ASSAY OF SERUM POTASSIUM: CPT

## 2022-11-08 PROCEDURE — 85014 HEMATOCRIT: CPT

## 2022-11-08 PROCEDURE — 83605 ASSAY OF LACTIC ACID: CPT

## 2022-11-08 PROCEDURE — 82977 ASSAY OF GGT: CPT

## 2022-11-08 PROCEDURE — 85379 FIBRIN DEGRADATION QUANT: CPT

## 2022-11-08 PROCEDURE — 36415 COLL VENOUS BLD VENIPUNCTURE: CPT

## 2022-11-08 PROCEDURE — 93970 EXTREMITY STUDY: CPT

## 2022-11-08 PROCEDURE — 82803 BLOOD GASES ANY COMBINATION: CPT

## 2022-11-08 PROCEDURE — 86140 C-REACTIVE PROTEIN: CPT

## 2022-11-08 PROCEDURE — 82435 ASSAY OF BLOOD CHLORIDE: CPT

## 2022-11-08 PROCEDURE — 87641 MR-STAPH DNA AMP PROBE: CPT

## 2022-11-08 PROCEDURE — U0005: CPT

## 2022-11-08 PROCEDURE — 85018 HEMOGLOBIN: CPT

## 2022-11-08 PROCEDURE — 82728 ASSAY OF FERRITIN: CPT

## 2022-11-08 PROCEDURE — 94640 AIRWAY INHALATION TREATMENT: CPT

## 2022-11-08 PROCEDURE — 96374 THER/PROPH/DIAG INJ IV PUSH: CPT

## 2022-11-08 PROCEDURE — 85027 COMPLETE CBC AUTOMATED: CPT

## 2022-11-08 PROCEDURE — 36600 WITHDRAWAL OF ARTERIAL BLOOD: CPT

## 2022-11-08 PROCEDURE — 83615 LACTATE (LD) (LDH) ENZYME: CPT

## 2022-11-08 PROCEDURE — 85730 THROMBOPLASTIN TIME PARTIAL: CPT

## 2022-11-08 PROCEDURE — 85610 PROTHROMBIN TIME: CPT

## 2022-11-08 PROCEDURE — 82962 GLUCOSE BLOOD TEST: CPT

## 2022-11-08 PROCEDURE — 94660 CPAP INITIATION&MGMT: CPT

## 2022-11-08 PROCEDURE — 83880 ASSAY OF NATRIURETIC PEPTIDE: CPT

## 2022-11-08 PROCEDURE — 85025 COMPLETE CBC W/AUTO DIFF WBC: CPT

## 2022-11-08 PROCEDURE — 85652 RBC SED RATE AUTOMATED: CPT

## 2022-11-08 PROCEDURE — 80053 COMPREHEN METABOLIC PANEL: CPT

## 2022-11-08 PROCEDURE — 80048 BASIC METABOLIC PNL TOTAL CA: CPT

## 2022-11-08 PROCEDURE — 87640 STAPH A DNA AMP PROBE: CPT

## 2022-11-08 PROCEDURE — 87086 URINE CULTURE/COLONY COUNT: CPT

## 2022-11-08 PROCEDURE — 99285 EMERGENCY DEPT VISIT HI MDM: CPT

## 2022-11-08 PROCEDURE — 97530 THERAPEUTIC ACTIVITIES: CPT

## 2022-11-08 PROCEDURE — 82010 KETONE BODYS QUAN: CPT

## 2022-11-08 PROCEDURE — 83735 ASSAY OF MAGNESIUM: CPT

## 2022-11-08 PROCEDURE — 84484 ASSAY OF TROPONIN QUANT: CPT

## 2022-11-08 PROCEDURE — 81001 URINALYSIS AUTO W/SCOPE: CPT

## 2022-11-08 PROCEDURE — 82947 ASSAY GLUCOSE BLOOD QUANT: CPT

## 2022-11-08 PROCEDURE — 82565 ASSAY OF CREATININE: CPT

## 2022-11-08 PROCEDURE — 71045 X-RAY EXAM CHEST 1 VIEW: CPT

## 2022-11-08 PROCEDURE — C8929: CPT

## 2022-11-08 PROCEDURE — 0225U NFCT DS DNA&RNA 21 SARSCOV2: CPT

## 2022-11-08 PROCEDURE — 84145 PROCALCITONIN (PCT): CPT

## 2022-11-08 PROCEDURE — 87040 BLOOD CULTURE FOR BACTERIA: CPT

## 2022-11-08 PROCEDURE — 84100 ASSAY OF PHOSPHORUS: CPT

## 2022-11-08 PROCEDURE — 97161 PT EVAL LOW COMPLEX 20 MIN: CPT

## 2022-11-15 ENCOUNTER — APPOINTMENT (OUTPATIENT)
Dept: INTERNAL MEDICINE | Facility: CLINIC | Age: 67
End: 2022-11-15

## 2022-11-15 VITALS — OXYGEN SATURATION: 97 % | HEART RATE: 88 BPM | DIASTOLIC BLOOD PRESSURE: 82 MMHG | SYSTOLIC BLOOD PRESSURE: 138 MMHG

## 2022-11-15 DIAGNOSIS — E66.2 MORBID (SEVERE) OBESITY WITH ALVEOLAR HYPOVENTILATION: ICD-10-CM

## 2022-11-15 DIAGNOSIS — Z23 ENCOUNTER FOR IMMUNIZATION: ICD-10-CM

## 2022-11-15 PROCEDURE — 99214 OFFICE O/P EST MOD 30 MIN: CPT | Mod: 25

## 2022-11-15 PROCEDURE — G0008: CPT

## 2022-11-15 PROCEDURE — 90662 IIV NO PRSV INCREASED AG IM: CPT

## 2022-11-15 RX ORDER — FLASH GLUCOSE SENSOR
KIT MISCELLANEOUS
Qty: 6 | Refills: 3 | Status: DISCONTINUED | COMMUNITY
Start: 2021-03-23 | End: 2022-11-15

## 2022-11-15 RX ORDER — FLASH GLUCOSE SCANNING READER
EACH MISCELLANEOUS
Qty: 1 | Refills: 0 | Status: DISCONTINUED | COMMUNITY
Start: 2021-03-23 | End: 2022-11-15

## 2022-11-15 RX ORDER — ISOPROPYL ALCOHOL 70 ML/100ML
70 SWAB TOPICAL
Qty: 100 | Refills: 0 | Status: ACTIVE | COMMUNITY
Start: 2022-10-26

## 2022-11-15 RX ORDER — INSULIN DETEMIR 100 [IU]/ML
100 INJECTION, SOLUTION SUBCUTANEOUS TWICE DAILY
Qty: 15 | Refills: 4 | Status: DISCONTINUED | COMMUNITY
Start: 2020-08-11 | End: 2022-11-15

## 2022-11-15 NOTE — ASSESSMENT
[FreeTextEntry1] : 1.  Recent admission for heart failure doing better on Lasix 40 mg daily but over the last couple of days noticed some increased swelling.  Discussed that when this happens she can take an additional dose of Lasix if need be.  We will check electrolytes today.  Has appointment with cardiologist in December\par 2.  CKD 3 now being diuresed.  We will check her BUN/creatinine.\par 3.  Diabetes mellitus now switched to Toujeo and NovoLog.  Reports that her fingersticks have been elevated and will increase the Toujeo to 82 units in the morning and 88 units in the p.m.  She will need to continue to monitor for any hypoglycemia.\par 4.  Super morbid obesity -referred to our medical weight management program.  Discussed the possible use of medication such as Ozempic.\par 5.  Obesity hypoventilation syndrome on O2.  Has not followed up with pulmonary and I stressed the importance of scheduling an appointment which she will do.\par 6.  Flu shot today\par 7.  Follow-up in 3 months

## 2022-11-15 NOTE — HISTORY OF PRESENT ILLNESS
[de-identified] : Yanet is a 67-year-old female with a complicated past medical history who comes in for follow-up after recent hospitalization.  I had seen her last month and she was found to be markedly edematous with anasarca and I sent her to the hospital as she was having severe shortness of breath.  She tells me that she was diuresed 53 pounds of fluid.  Hospital course was complicated by COVID and she continues to be O2 dependent.  She has a history of diabetes and was placed on Toujeo 80 units in the morning and 86 in the evening but notes that her sugars are still running high.  She is also using short acting insulin with meals.  Denies any hypoglycemia at this time.  Is very amenable to getting a freestyle cooper 3 sensor so that she can keep better track of her glucose.\par \par She continues to remain O2 dependent which is felt to be due to obesity hypoventilation.  She continues with weakness from her cervical radiculopathy and spends most of her time in the wheelchair or in bed.  Denies any skin breakdown at this time but wants me look at her right foot.  No weeping through her skin at this time.\par \par Discharge summary read and appreciated.  Medication reconciliation done.

## 2022-11-16 LAB
ALBUMIN SERPL ELPH-MCNC: 4.3 G/DL
ALP BLD-CCNC: 120 U/L
ALT SERPL-CCNC: 21 U/L
ANION GAP SERPL CALC-SCNC: 15 MMOL/L
AST SERPL-CCNC: 19 U/L
BASOPHILS # BLD AUTO: 0.08 K/UL
BASOPHILS NFR BLD AUTO: 0.9 %
BILIRUB SERPL-MCNC: 0.6 MG/DL
BUN SERPL-MCNC: 30 MG/DL
CALCIUM SERPL-MCNC: 9.7 MG/DL
CHLORIDE SERPL-SCNC: 99 MMOL/L
CHOLEST SERPL-MCNC: 162 MG/DL
CO2 SERPL-SCNC: 28 MMOL/L
CREAT SERPL-MCNC: 1.56 MG/DL
EGFR: 36 ML/MIN/1.73M2
EOSINOPHIL # BLD AUTO: 0.27 K/UL
EOSINOPHIL NFR BLD AUTO: 3.1 %
ESTIMATED AVERAGE GLUCOSE: 186 MG/DL
GLUCOSE SERPL-MCNC: 184 MG/DL
HBA1C MFR BLD HPLC: 8.1 %
HCT VFR BLD CALC: 43.5 %
HDLC SERPL-MCNC: 81 MG/DL
HGB BLD-MCNC: 13.2 G/DL
IMM GRANULOCYTES NFR BLD AUTO: 1.5 %
LDLC SERPL CALC-MCNC: 58 MG/DL
LYMPHOCYTES # BLD AUTO: 1.24 K/UL
LYMPHOCYTES NFR BLD AUTO: 14.2 %
MAN DIFF?: NORMAL
MCHC RBC-ENTMCNC: 27.8 PG
MCHC RBC-ENTMCNC: 30.3 GM/DL
MCV RBC AUTO: 91.6 FL
MONOCYTES # BLD AUTO: 0.64 K/UL
MONOCYTES NFR BLD AUTO: 7.3 %
NEUTROPHILS # BLD AUTO: 6.38 K/UL
NEUTROPHILS NFR BLD AUTO: 73 %
NONHDLC SERPL-MCNC: 81 MG/DL
PLATELET # BLD AUTO: 195 K/UL
POTASSIUM SERPL-SCNC: 4.5 MMOL/L
PROT SERPL-MCNC: 6.9 G/DL
RBC # BLD: 4.75 M/UL
RBC # FLD: 15.3 %
SODIUM SERPL-SCNC: 142 MMOL/L
T4 FREE SERPL-MCNC: 1 NG/DL
TRIGL SERPL-MCNC: 117 MG/DL
TSH SERPL-ACNC: 4.9 UIU/ML
WBC # FLD AUTO: 8.74 K/UL

## 2022-12-01 NOTE — ED PROVIDER NOTE - IV ALTEPLASE EXCL REL HIDDEN
report received from JACKY espinoza. pt reports to ED c/o abdominal asparin pt tba for pancreatis. pt is A&Ox3 and VSS. aware of poc, no needs at this time.
show

## 2022-12-20 ENCOUNTER — RX RENEWAL (OUTPATIENT)
Age: 67
End: 2022-12-20

## 2022-12-20 NOTE — H&P ADULT - NSICDXPASTMEDICALHX_GEN_ALL_CORE_FT
PAST MEDICAL HISTORY:  Asthma     Cataract     Cervical Stenosis of Spine     CKD (chronic kidney disease) ~ III    Congestive heart failure ~ HFpEF    Deep Vein Thrombosis (DVT) Left leg, 2004, treated and resolved    Diabetes Mellitus Type II     Dyslipidemia     Endometrial Hyperplasia     Gait difficulty ~ u ses walker    HTN (Hypertension)     Insomnia     Morbid Obesity     On home oxygen therapy     Spinal Stenosis, Lumbar     Uterine cancer     Vitamin D deficiency     
TOKEN:'[id number]:'[id number]',FREE:[LAST:[Brady],FIRST:[Dallin],PHONE:[(   )    -],FAX:[(   )    -],ADDRESS:[Primary Care Doctor]],TOKEN:'[id number]'
Secondary Intention Text (Leave Blank If You Do Not Want): The defect will heal with secondary intention.

## 2022-12-30 NOTE — ED PROVIDER NOTE - WR ORDER STATUS 1
CARPAL TUNNEL RELEASE, FINGER TRIGGER RELEASE  Procedure Report    Patient Name:  Sherly Jo  YOB: 1964    Date of Surgery:  12/29/2022       Pre-op Diagnosis:   Right carpal tunnel syndrome [G56.01]   Right second finger trigger finger  Left second finger recurrent trigger finger       Post-Op Diagnosis Codes:     * Right carpal tunnel syndrome [G56.01]   Same  Procedure/CPT® Codes:      Procedure(s):  RIGHT CARPAL TUNNEL RELEASE  SECOND FINGER TRIGGER RELEASE  Left second finger trigger release      Staff:  Surgeon(s):  Jonnathan Alegria MD    Assistant: Dennys Espinosa RN    Anesthesia: General    Estimated Blood Loss: 2 mL    Implants:    Nothing was implanted during the procedure    Specimen:          None      Complications: None    Description of Procedure:   RIGHT CARPAL TUNNEL RELEASE:  The right hand and upper extremity were prepped and draped satisfactorily using alcohol and ChloraPrep.      A standard incision was made just ulnar to the thenar crease to the thumb, approximately 1.5 cm length, carried down through subcutaneous tissue and fascia, carried down to the palmaris fascia with tenotomy scissors.  Using a 69 blade the transverse carpal ligament was released over the median nerve.  The median nerve had significant signs of compression.  A Wanette elevator was used to protect the nerves during the release and after release had been completed, it was checked proximally and distally and under loupe magnification was satisfactory.  The nerve was inspected and was intact along with the motor branch.  The wound was copiously irrigated.  The skin was closed with horizontal mattress suture nylon and interrupted 3-0 horizontal mattress sutures.  Small incision was made over the A1 pulley of the right second finger dissection was carried down to the A1 pulley the A1 pulley was released completely the tendons were pulled free of the pulley finger no longer triggered.  The incisions  were washed and dried and sterile dressings were applied.  The left second finger was addressed after sterilely prepping the left hand.  Previous incision was reemployed dissection was carried down to the A1 pulley there is significant scarring of the A1 pulley which was thoroughly released and the scar tissue.  Potential problem was some scarring the distal aspect of the pulley which may have caused the incomplete release previously or contributed to postoperative scarring end of that distal part of the A1 pulley of the finger no longer triggered after the release the tendons were pulled free of the pulley the wound was copiously irrigated and the skin was closed with nylon incisions washed dry sterile dressings were applied patient tolerated seizure well was taken recovery room.      Jonnathan Alegria MD     Date: 12/30/2022  Time: 12:24 EST     Resulted

## 2023-01-04 ENCOUNTER — APPOINTMENT (OUTPATIENT)
Dept: HEART FAILURE | Facility: CLINIC | Age: 68
End: 2023-01-04

## 2023-01-29 ENCOUNTER — RX RENEWAL (OUTPATIENT)
Age: 68
End: 2023-01-29

## 2023-01-30 ENCOUNTER — RX RENEWAL (OUTPATIENT)
Age: 68
End: 2023-01-30

## 2023-02-01 NOTE — PHYSICAL THERAPY INITIAL EVALUATION ADULT - MD/RN NOTIFIED
yes Imiquimod Counseling:  I discussed with the patient the risks of imiquimod including but not limited to erythema, scaling, itching, weeping, crusting, and pain.  Patient understands that the inflammatory response to imiquimod is variable from person to person and was educated regarded proper titration schedule.  If flu-like symptoms develop, patient knows to discontinue the medication and contact us.

## 2023-02-07 RX ORDER — LANCETS 33 GAUGE
EACH MISCELLANEOUS
Qty: 2 | Refills: 5 | Status: ACTIVE | COMMUNITY
Start: 2019-10-28 | End: 1900-01-01

## 2023-02-07 RX ORDER — LANCETS 28 GAUGE
EACH MISCELLANEOUS
Qty: 4 | Refills: 3 | Status: ACTIVE | COMMUNITY
Start: 2017-06-23

## 2023-02-07 RX ORDER — NYSTATIN 100000 1/G
100000 POWDER TOPICAL TWICE DAILY
Qty: 2 | Refills: 5 | Status: ACTIVE | COMMUNITY
Start: 2020-11-25 | End: 1900-01-01

## 2023-02-10 ENCOUNTER — RX RENEWAL (OUTPATIENT)
Age: 68
End: 2023-02-10

## 2023-02-12 NOTE — PROGRESS NOTE ADULT - PROBLEM SELECTOR PROBLEM 5
PROCEDURE:  ABDOMEN/PELVIS W

 

INDICATIONS:  fell off truck; RUQ/LUQ abd pain

 

CONTRAST: 70ml Omnipaque 300 

 

TECHNIQUE:  

After the administration of IV contrast, 5 mm thick sections acquired from the diaphragms to the symp
hysis.  5 mm thick coronal and sagittal reformats were acquired.  For radiation dose reduction, the f
ollowing was used:  automated exposure control, adjustment of mA and/or kV according to patient size.
  

 

COMPARISON:  None.

 

FINDINGS:  

Image quality:  Excellent.  

 

ABDOMEN:  

Lung bases:  Lung bases are clear.  Heart size is normal.  

 

Solid organs: Laceration to midportion of spleen is seen with moderate size subcapsular hematoma. No 
definite vessel injury is seen. No active contrast extravasation is noted from splenic artery. Liver 
is normal in size and enhancement. Gallbladder is within normal limits.  Biliary system is non dilate
d.  Pancreas enhances normally.  No adrenal nodules.  Kidneys demonstrate normal size and enhancement
, without hydronephrosis.  

 

Peritoneum and bowel: Moderate amount of free fluid is noted in abdomen and pelvis. No gross free air
. No bowel obstruction or abnormal bowel wall thickening. Appendix is visualized and is within normal
 limits. Mild fecal stasis in the colon is seen.

 

Nodes and vessels:  No retroperitoneal or mesenteric adenopathy by size criteria.  Aorta and inferior
 vena cava are normal in size.  

 

Miscellaneous:  No ventral hernias.  

 

 

PELVIS:  

Genitourinary:  Bladder wall thickness is normal.  

 

Miscellaneous:  No inguinal hernias or adenopathy.  

 

Bones:  No suspicious bony lesions.  No vertebral body compression fractures.  

 

IMPRESSION:  

1. Finding is suggestive of at least grade III splenic laceration. No definite splenic vessel injury 
or active contrast extravasation is noted.

2. Moderate to large amount of free fluid in abdomen and pelvis suggestive of hemoperitoneum. No earl
s free air.

3. No other solid organ injury is seen in abdomen or pelvis.

4. No gross acute fracture or dislocation.

 

Findings were reported to the referring ER physician at the time of the dictation.

 

Reviewed by: Missael Land MD on 2/12/2023 3:42 PM PST

Approved by: Missael Land MD on 2/12/2023 3:42 PM PST

 

 

Station ID:  [id number]
Asthma, unspecified asthma severity, unspecified whether complicated, unspecified whether persistent

## 2023-02-15 NOTE — ED ADULT NURSE REASSESSMENT NOTE - NS ED NURSE REASSESS COMMENT FT1
Pt is alert and oriented. Pt denies sob, chest pain, nausea, vomiting, and dizziness. Pt states that she is having lower back pain, pt medicated as per MAR. Pt resp are even and unlabored, skin color pilo for race. Pt updated on plan of care. pt educated on plan of care, pt able to successfully teach back plan of care to RN, RN will continue to reeducate pt during hospital stay. Unknown

## 2023-02-17 ENCOUNTER — RX RENEWAL (OUTPATIENT)
Age: 68
End: 2023-02-17

## 2023-02-17 RX ORDER — SYRING-NEEDL,DISP,INSUL,0.3 ML 31 GX5/16"
31G X 5/16" SYRINGE, EMPTY DISPOSABLE MISCELLANEOUS
Qty: 1 | Refills: 11 | Status: ACTIVE | COMMUNITY
Start: 2020-12-14 | End: 1900-01-01

## 2023-02-22 ENCOUNTER — APPOINTMENT (OUTPATIENT)
Dept: INTERNAL MEDICINE | Facility: CLINIC | Age: 68
End: 2023-02-22

## 2023-03-08 ENCOUNTER — APPOINTMENT (OUTPATIENT)
Dept: HEART FAILURE | Facility: CLINIC | Age: 68
End: 2023-03-08

## 2023-03-21 ENCOUNTER — APPOINTMENT (OUTPATIENT)
Dept: INTERNAL MEDICINE | Facility: CLINIC | Age: 68
End: 2023-03-21
Payer: MEDICARE

## 2023-03-21 VITALS — SYSTOLIC BLOOD PRESSURE: 130 MMHG | HEART RATE: 98 BPM | OXYGEN SATURATION: 93 % | DIASTOLIC BLOOD PRESSURE: 84 MMHG

## 2023-03-21 DIAGNOSIS — N18.9 CHRONIC KIDNEY DISEASE, UNSPECIFIED: ICD-10-CM

## 2023-03-21 DIAGNOSIS — I10 ESSENTIAL (PRIMARY) HYPERTENSION: ICD-10-CM

## 2023-03-21 DIAGNOSIS — E66.01 MORBID (SEVERE) OBESITY DUE TO EXCESS CALORIES: ICD-10-CM

## 2023-03-21 DIAGNOSIS — E11.9 TYPE 2 DIABETES MELLITUS W/OUT COMPLICATIONS: ICD-10-CM

## 2023-03-21 DIAGNOSIS — E03.8 OTHER SPECIFIED HYPOTHYROIDISM: ICD-10-CM

## 2023-03-21 DIAGNOSIS — E78.5 HYPERLIPIDEMIA, UNSPECIFIED: ICD-10-CM

## 2023-03-21 PROCEDURE — 99214 OFFICE O/P EST MOD 30 MIN: CPT

## 2023-03-21 NOTE — REVIEW OF SYSTEMS
[Fatigue] : fatigue [Lower Ext Edema] : lower extremity edema [Shortness Of Breath] : shortness of breath

## 2023-03-21 NOTE — ASSESSMENT
[FreeTextEntry1] : 1.  Heart failure with preserved ejection fraction.  Continues to third space fluids.  Recommend she increase her Lasix to 60 mg daily.  We will check electrolytes today.  Stressed once again the importance of being followed by the heart failure program.  They are located very close to where she lives and I told her there really are no excuses.  I also told her if she continues to cancel appointments they are often allowed her to make an.\par 2.  Diabetes mellitus -we will check hemoglobin A1c today.  Unable to examine feet.  Further management pending results of the A1c.  Unable to get a urine microalbumin secondary to her body habitus.\par 3.  Hypertension, at this time.  Continue current management.\par 4.  Obesity hypoventilation syndrome complicated by heart failure.  O2 sat 93% on 2 L of oxygen.  I have referred her several times to pulmonary and have not been able to get her back.  Urged to make an appointment again.\par 5.  Patient was given the names of providers near her home as she knows I am leaving this practice toward the end of next month.  She will make a follow-up appointment with them.\par 6.  Chronic pain on OxyContin and oxycodone.  Rx renewed today for her OxyContin.  I stop checked.\par 7.  Return to office in 3 months or as needed

## 2023-03-21 NOTE — PHYSICAL EXAM
[Normal Rate] : normal rate  [Normal S1, S2] : normal S1 and S2 [de-identified] : Exam limited as she was sitting in her wheelchair.  She could not even take her feet off of the foot rests because she would never get them back on again she said.  Mild shortness of breath at rest was noted and she was chronically ill appearing [de-identified] : Decreased breath sounds throughout [de-identified] : Tensely distended likely due to anasarca.  Voluminous in nature.

## 2023-03-21 NOTE — HISTORY OF PRESENT ILLNESS
[de-identified] : 67-year-old female with a history of diabetes, heart failure, super morbid obesity, asthma, chronic shortness of breath, hemiparesis secondary to cervical neck disease, PAD, pulmonary hypertension, obesity hypoventilation syndrome, O2 dependent here for follow-up.  Patient continues to be severely dyspneic with limited movement.  Needs O2 continuously as she gets very short of breath if she takes it off.  She was admitted at the end of last year in heart failure with total body anasarca and discharged with instructions to see both heart failure program and pulmonology.  She has continued to resist going to them and has canceled numerous times.  Her edema has returned but not as bad as it was before II sent her to the hospital.\par \par She is continuing to have difficulty with her mobile chair.  It is no longer the right size for her and she feels the motor is breaking down.  Has been having trouble getting in touch with her Worker's .

## 2023-03-23 LAB
ALBUMIN SERPL ELPH-MCNC: 4.7 G/DL
ALP BLD-CCNC: 139 U/L
ALT SERPL-CCNC: 14 U/L
ANION GAP SERPL CALC-SCNC: 12 MMOL/L
AST SERPL-CCNC: 18 U/L
BASOPHILS # BLD AUTO: 0.12 K/UL
BASOPHILS NFR BLD AUTO: 1.2 %
BILIRUB SERPL-MCNC: 0.5 MG/DL
BUN SERPL-MCNC: 36 MG/DL
CALCIUM SERPL-MCNC: 10.4 MG/DL
CHLORIDE SERPL-SCNC: 101 MMOL/L
CHOLEST SERPL-MCNC: 157 MG/DL
CO2 SERPL-SCNC: 31 MMOL/L
CREAT SERPL-MCNC: 1.79 MG/DL
EGFR: 31 ML/MIN/1.73M2
EOSINOPHIL # BLD AUTO: 0.26 K/UL
EOSINOPHIL NFR BLD AUTO: 2.6 %
ESTIMATED AVERAGE GLUCOSE: 154 MG/DL
GLUCOSE SERPL-MCNC: 142 MG/DL
HBA1C MFR BLD HPLC: 7 %
HCT VFR BLD CALC: 44.5 %
HDLC SERPL-MCNC: 80 MG/DL
HGB BLD-MCNC: 12.6 G/DL
IMM GRANULOCYTES NFR BLD AUTO: 1.7 %
LDLC SERPL CALC-MCNC: 63 MG/DL
LYMPHOCYTES # BLD AUTO: 1.28 K/UL
LYMPHOCYTES NFR BLD AUTO: 12.6 %
MAN DIFF?: NORMAL
MCHC RBC-ENTMCNC: 26.7 PG
MCHC RBC-ENTMCNC: 28.3 GM/DL
MCV RBC AUTO: 94.3 FL
MONOCYTES # BLD AUTO: 0.96 K/UL
MONOCYTES NFR BLD AUTO: 9.5 %
NEUTROPHILS # BLD AUTO: 7.33 K/UL
NEUTROPHILS NFR BLD AUTO: 72.4 %
NONHDLC SERPL-MCNC: 77 MG/DL
PLATELET # BLD AUTO: 251 K/UL
POTASSIUM SERPL-SCNC: 5.1 MMOL/L
PROT SERPL-MCNC: 7.2 G/DL
RBC # BLD: 4.72 M/UL
RBC # FLD: 15.3 %
SODIUM SERPL-SCNC: 144 MMOL/L
T4 FREE SERPL-MCNC: 1.1 NG/DL
TRIGL SERPL-MCNC: 68 MG/DL
TSH SERPL-ACNC: 2.79 UIU/ML
WBC # FLD AUTO: 10.12 K/UL

## 2023-03-30 ENCOUNTER — RX RENEWAL (OUTPATIENT)
Age: 68
End: 2023-03-30

## 2023-03-30 RX ORDER — ALBUTEROL SULFATE 90 UG/1
108 (90 BASE) INHALANT RESPIRATORY (INHALATION)
Qty: 1 | Refills: 5 | Status: ACTIVE | COMMUNITY
Start: 2018-11-13 | End: 1900-01-01

## 2023-04-03 ENCOUNTER — RX RENEWAL (OUTPATIENT)
Age: 68
End: 2023-04-03

## 2023-04-18 RX ORDER — BLOOD SUGAR DIAGNOSTIC
STRIP MISCELLANEOUS
Qty: 3 | Refills: 3 | Status: ACTIVE | COMMUNITY
Start: 2019-09-25 | End: 1900-01-01

## 2023-05-15 NOTE — PROGRESS NOTE ADULT - ASSESSMENT
65F history of CDK3, DM2. HTN, Asthma, DVT, Insomnia, Spinal stenosis, Uterine Ca s/p MEGAN, old LLE DVT no longer on A/C, admitted for R LE cellulitis due to R plantar foot diabetic ulcer.   Podiatry consult appreciated.  Counseling Text: I reviewed the side effect in detail. Patient should get monthly blood tests, not donate blood, not drive at night if vision affected, and not share medication.

## 2023-06-27 RX ORDER — PEN NEEDLE, DIABETIC 29 G X1/2"
32G X 4 MM NEEDLE, DISPOSABLE MISCELLANEOUS
Qty: 1 | Refills: 3 | Status: ACTIVE | COMMUNITY
Start: 2022-06-24 | End: 1900-01-01

## 2023-06-28 ENCOUNTER — NON-APPOINTMENT (OUTPATIENT)
Age: 68
End: 2023-06-28

## 2023-07-13 NOTE — ED ADULT TRIAGE NOTE - WEIGHT IN KG
136.1

## 2023-07-19 NOTE — H&P ADULT - PROBLEM SELECTOR PLAN 4
- On Toujeo 80 units AM, 84 units PM + Novolog 20 qac at home  - c/w Lantus 80/84, Admelog 20  - Low dose correctional scale Wound Care: Mupirocin - On Toujeo 80 units AM, 84 units PM + Novolog 20 qac at home  - c/w Lantus at reduced dose to 50 qhs for now, titrate as needed  - Hold premeal insulin for now  - Low dose correctional scale

## 2023-07-25 ENCOUNTER — APPOINTMENT (OUTPATIENT)
Dept: INTERNAL MEDICINE | Facility: CLINIC | Age: 68
End: 2023-07-25

## 2023-08-16 ENCOUNTER — APPOINTMENT (OUTPATIENT)
Dept: INTERNAL MEDICINE | Facility: CLINIC | Age: 68
End: 2023-08-16
Payer: MEDICARE

## 2023-08-16 DIAGNOSIS — R26.2 DIFFICULTY IN WALKING, NOT ELSEWHERE CLASSIFIED: ICD-10-CM

## 2023-08-16 DIAGNOSIS — E65 LOCALIZED ADIPOSITY: ICD-10-CM

## 2023-08-16 DIAGNOSIS — M48.00 SPINAL STENOSIS, SITE UNSPECIFIED: ICD-10-CM

## 2023-08-16 DIAGNOSIS — R21 RASH AND OTHER NONSPECIFIC SKIN ERUPTION: ICD-10-CM

## 2023-08-16 DIAGNOSIS — E11.610 TYPE 2 DIABETES MELLITUS WITH DIABETIC NEUROPATHIC ARTHROPATHY: ICD-10-CM

## 2023-08-16 DIAGNOSIS — I50.30 UNSPECIFIED DIASTOLIC (CONGESTIVE) HEART FAILURE: ICD-10-CM

## 2023-08-16 PROCEDURE — 99213 OFFICE O/P EST LOW 20 MIN: CPT | Mod: 95

## 2023-08-16 RX ORDER — LISINOPRIL 5 MG/1
5 TABLET ORAL DAILY
Qty: 90 | Refills: 3 | Status: COMPLETED | COMMUNITY
Start: 2022-05-18 | End: 2022-10-10

## 2023-08-16 RX ORDER — ECONAZOLE NITRATE 10 MG/G
1 CREAM TOPICAL TWICE DAILY
Qty: 1 | Refills: 11 | Status: ACTIVE | COMMUNITY
Start: 2023-08-16 | End: 1900-01-01

## 2023-08-16 NOTE — HISTORY OF PRESENT ILLNESS
[FreeTextEntry1] : Follow Up Meds [Home] : at home, [unfilled] , at the time of the visit. [Medical Office: ([facility])___] : at the medical office located in  [Verbal consent obtained from patient] : the patient, [unfilled] [de-identified] : 67 yo type 2 DM neuropathy Charcot joint Cardiomyopathy Hosp anasarca CHF Oct 2022 where stopped lisinopril 30mg--->5mg--->D/C Spinal stenosis on chronic oxycontin. Tapered 80mg---> 60mg Warned narcotic tolerance and risk of respiratory suppression  Morbid obesity lost weight. Unable to weigh herself, unable to measure BP needs a new cuff Mostly bed and home bound. Has not gone to Pharmacy for vaccines O2 dependency 3 .5 Liters pre hosp  post hosp 4 Liters O2 RA now 84%  O2 4L = 92% Anxiety declines medication open to phone therapy Pannus erythema small area break will use bacitracin  mammo 2018 colonoscopy 2018 UTD Prev 13 pneumo 23 x 2 J&J need COVID update and clarification  Only has J&J card 2021  Need Tdap March 2013 Need SHINGRIX Need high dose influenza Oct 2023  consider RSV?  Hitesh present SON = HC Proxy Advised consider DNR status  Will think about it  Meds  only ASA 81 furosemide 40-60mg daily Toujeo 80 units AM  86 units PM Novolog 26 units qac  DECR from 30 U caused hypo no longer lisinopril HAS not taken home BP  No hypoglycemic episodes No CP No SOB No palpitations no hematochezia No hematuria

## 2023-08-16 NOTE — REVIEW OF SYSTEMS
[Fever] : no fever [Chills] : no chills [Night Sweats] : no night sweats [Chest Pain] : no chest pain [Palpitations] : no palpitations [Orthopnea] : no orthopnea [Shortness Of Breath] : no shortness of breath [Wheezing] : no wheezing [Abdominal Pain] : no abdominal pain

## 2023-08-30 ENCOUNTER — INPATIENT (INPATIENT)
Facility: HOSPITAL | Age: 68
LOS: 13 days | Discharge: NURSING, REHAB OR LONG-TERM CARE | DRG: 264 | End: 2023-09-13
Attending: HOSPITALIST | Admitting: FAMILY MEDICINE
Payer: MEDICARE

## 2023-08-30 ENCOUNTER — NON-APPOINTMENT (OUTPATIENT)
Age: 68
End: 2023-08-30

## 2023-08-30 VITALS
OXYGEN SATURATION: 99 % | HEART RATE: 94 BPM | TEMPERATURE: 98 F | RESPIRATION RATE: 17 BRPM | DIASTOLIC BLOOD PRESSURE: 72 MMHG | SYSTOLIC BLOOD PRESSURE: 158 MMHG | WEIGHT: 293 LBS

## 2023-08-30 DIAGNOSIS — Z90.49 ACQUIRED ABSENCE OF OTHER SPECIFIED PARTS OF DIGESTIVE TRACT: Chronic | ICD-10-CM

## 2023-08-30 DIAGNOSIS — I21.4 NON-ST ELEVATION (NSTEMI) MYOCARDIAL INFARCTION: ICD-10-CM

## 2023-08-30 DIAGNOSIS — S91.309A UNSPECIFIED OPEN WOUND, UNSPECIFIED FOOT, INITIAL ENCOUNTER: ICD-10-CM

## 2023-08-30 DIAGNOSIS — Z98.890 OTHER SPECIFIED POSTPROCEDURAL STATES: Chronic | ICD-10-CM

## 2023-08-30 DIAGNOSIS — R77.8 OTHER SPECIFIED ABNORMALITIES OF PLASMA PROTEINS: ICD-10-CM

## 2023-08-30 DIAGNOSIS — R06.00 DYSPNEA, UNSPECIFIED: ICD-10-CM

## 2023-08-30 DIAGNOSIS — E66.01 MORBID (SEVERE) OBESITY DUE TO EXCESS CALORIES: ICD-10-CM

## 2023-08-30 DIAGNOSIS — I50.33 ACUTE ON CHRONIC DIASTOLIC (CONGESTIVE) HEART FAILURE: ICD-10-CM

## 2023-08-30 DIAGNOSIS — R79.89 OTHER SPECIFIED ABNORMAL FINDINGS OF BLOOD CHEMISTRY: ICD-10-CM

## 2023-08-30 DIAGNOSIS — E11.9 TYPE 2 DIABETES MELLITUS WITHOUT COMPLICATIONS: ICD-10-CM

## 2023-08-30 DIAGNOSIS — Z90.710 ACQUIRED ABSENCE OF BOTH CERVIX AND UTERUS: Chronic | ICD-10-CM

## 2023-08-30 DIAGNOSIS — R09.89 OTHER SPECIFIED SYMPTOMS AND SIGNS INVOLVING THE CIRCULATORY AND RESPIRATORY SYSTEMS: ICD-10-CM

## 2023-08-30 DIAGNOSIS — N18.30 CHRONIC KIDNEY DISEASE, STAGE 3 UNSPECIFIED: ICD-10-CM

## 2023-08-30 LAB
ALBUMIN SERPL ELPH-MCNC: 3.7 G/DL — SIGNIFICANT CHANGE UP (ref 3.3–5.2)
ALP SERPL-CCNC: 222 U/L — HIGH (ref 40–120)
ALT FLD-CCNC: 18 U/L — SIGNIFICANT CHANGE UP
ANION GAP SERPL CALC-SCNC: 11 MMOL/L — SIGNIFICANT CHANGE UP (ref 5–17)
APTT BLD: 30.6 SEC — SIGNIFICANT CHANGE UP (ref 24.5–35.6)
AST SERPL-CCNC: 26 U/L — SIGNIFICANT CHANGE UP
BASOPHILS # BLD AUTO: 0.09 K/UL — SIGNIFICANT CHANGE UP (ref 0–0.2)
BASOPHILS NFR BLD AUTO: 0.9 % — SIGNIFICANT CHANGE UP (ref 0–2)
BILIRUB SERPL-MCNC: 0.6 MG/DL — SIGNIFICANT CHANGE UP (ref 0.4–2)
BUN SERPL-MCNC: 39.9 MG/DL — HIGH (ref 8–20)
CALCIUM SERPL-MCNC: 9.3 MG/DL — SIGNIFICANT CHANGE UP (ref 8.4–10.5)
CHLORIDE SERPL-SCNC: 102 MMOL/L — SIGNIFICANT CHANGE UP (ref 96–108)
CO2 SERPL-SCNC: 30 MMOL/L — HIGH (ref 22–29)
CREAT SERPL-MCNC: 1.89 MG/DL — HIGH (ref 0.5–1.3)
D DIMER BLD IA.RAPID-MCNC: 407 NG/ML DDU — HIGH
EGFR: 28 ML/MIN/1.73M2 — LOW
EOSINOPHIL # BLD AUTO: 0.17 K/UL — SIGNIFICANT CHANGE UP (ref 0–0.5)
EOSINOPHIL NFR BLD AUTO: 1.8 % — SIGNIFICANT CHANGE UP (ref 0–6)
GLUCOSE BLDC GLUCOMTR-MCNC: 51 MG/DL — CRITICAL LOW (ref 70–99)
GLUCOSE BLDC GLUCOMTR-MCNC: 85 MG/DL — SIGNIFICANT CHANGE UP (ref 70–99)
GLUCOSE SERPL-MCNC: 75 MG/DL — SIGNIFICANT CHANGE UP (ref 70–99)
HCT VFR BLD CALC: 37.4 % — SIGNIFICANT CHANGE UP (ref 34.5–45)
HGB BLD-MCNC: 11 G/DL — LOW (ref 11.5–15.5)
IMM GRANULOCYTES NFR BLD AUTO: 1.5 % — HIGH (ref 0–0.9)
INR BLD: 1.07 RATIO — SIGNIFICANT CHANGE UP (ref 0.85–1.18)
LYMPHOCYTES # BLD AUTO: 0.87 K/UL — LOW (ref 1–3.3)
LYMPHOCYTES # BLD AUTO: 9.1 % — LOW (ref 13–44)
MCHC RBC-ENTMCNC: 27.5 PG — SIGNIFICANT CHANGE UP (ref 27–34)
MCHC RBC-ENTMCNC: 29.4 GM/DL — LOW (ref 32–36)
MCV RBC AUTO: 93.5 FL — SIGNIFICANT CHANGE UP (ref 80–100)
MONOCYTES # BLD AUTO: 0.73 K/UL — SIGNIFICANT CHANGE UP (ref 0–0.9)
MONOCYTES NFR BLD AUTO: 7.6 % — SIGNIFICANT CHANGE UP (ref 2–14)
NEUTROPHILS # BLD AUTO: 7.58 K/UL — HIGH (ref 1.8–7.4)
NEUTROPHILS NFR BLD AUTO: 79.1 % — HIGH (ref 43–77)
NT-PROBNP SERPL-SCNC: 1623 PG/ML — HIGH (ref 0–300)
PLATELET # BLD AUTO: 229 K/UL — SIGNIFICANT CHANGE UP (ref 150–400)
POTASSIUM SERPL-MCNC: 5.5 MMOL/L — HIGH (ref 3.5–5.3)
POTASSIUM SERPL-SCNC: 5.5 MMOL/L — HIGH (ref 3.5–5.3)
PROT SERPL-MCNC: 7 G/DL — SIGNIFICANT CHANGE UP (ref 6.6–8.7)
PROTHROM AB SERPL-ACNC: 11.8 SEC — SIGNIFICANT CHANGE UP (ref 9.5–13)
RAPID RVP RESULT: SIGNIFICANT CHANGE UP
RBC # BLD: 4 M/UL — SIGNIFICANT CHANGE UP (ref 3.8–5.2)
RBC # FLD: 15.9 % — HIGH (ref 10.3–14.5)
SARS-COV-2 RNA SPEC QL NAA+PROBE: SIGNIFICANT CHANGE UP
SODIUM SERPL-SCNC: 143 MMOL/L — SIGNIFICANT CHANGE UP (ref 135–145)
TROPONIN T SERPL-MCNC: 0.09 NG/ML — HIGH (ref 0–0.06)
WBC # BLD: 9.58 K/UL — SIGNIFICANT CHANGE UP (ref 3.8–10.5)
WBC # FLD AUTO: 9.58 K/UL — SIGNIFICANT CHANGE UP (ref 3.8–10.5)

## 2023-08-30 PROCEDURE — 99223 1ST HOSP IP/OBS HIGH 75: CPT

## 2023-08-30 PROCEDURE — 71045 X-RAY EXAM CHEST 1 VIEW: CPT | Mod: 26

## 2023-08-30 PROCEDURE — 73620 X-RAY EXAM OF FOOT: CPT | Mod: 26,RT

## 2023-08-30 PROCEDURE — 99285 EMERGENCY DEPT VISIT HI MDM: CPT

## 2023-08-30 RX ORDER — HEPARIN SODIUM 5000 [USP'U]/ML
5000 INJECTION INTRAVENOUS; SUBCUTANEOUS EVERY 6 HOURS
Refills: 0 | Status: DISCONTINUED | OUTPATIENT
Start: 2023-08-30 | End: 2023-09-06

## 2023-08-30 RX ORDER — SODIUM CHLORIDE 9 MG/ML
1000 INJECTION, SOLUTION INTRAVENOUS
Refills: 0 | Status: DISCONTINUED | OUTPATIENT
Start: 2023-08-30 | End: 2023-09-13

## 2023-08-30 RX ORDER — DEXTROSE 50 % IN WATER 50 %
25 SYRINGE (ML) INTRAVENOUS ONCE
Refills: 0 | Status: DISCONTINUED | OUTPATIENT
Start: 2023-08-30 | End: 2023-09-13

## 2023-08-30 RX ORDER — ASPIRIN/CALCIUM CARB/MAGNESIUM 324 MG
81 TABLET ORAL DAILY
Refills: 0 | Status: DISCONTINUED | OUTPATIENT
Start: 2023-08-30 | End: 2023-09-13

## 2023-08-30 RX ORDER — ALBUTEROL 90 UG/1
2.5 AEROSOL, METERED ORAL EVERY 4 HOURS
Refills: 0 | Status: DISCONTINUED | OUTPATIENT
Start: 2023-08-30 | End: 2023-09-13

## 2023-08-30 RX ORDER — ATORVASTATIN CALCIUM 80 MG/1
40 TABLET, FILM COATED ORAL AT BEDTIME
Refills: 0 | Status: DISCONTINUED | OUTPATIENT
Start: 2023-08-30 | End: 2023-09-13

## 2023-08-30 RX ORDER — POLYETHYLENE GLYCOL 3350 17 G/17G
17 POWDER, FOR SOLUTION ORAL DAILY
Refills: 0 | Status: DISCONTINUED | OUTPATIENT
Start: 2023-08-30 | End: 2023-09-13

## 2023-08-30 RX ORDER — FUROSEMIDE 40 MG
40 TABLET ORAL ONCE
Refills: 0 | Status: COMPLETED | OUTPATIENT
Start: 2023-08-30 | End: 2023-08-30

## 2023-08-30 RX ORDER — DEXTROSE 50 % IN WATER 50 %
12.5 SYRINGE (ML) INTRAVENOUS ONCE
Refills: 0 | Status: COMPLETED | OUTPATIENT
Start: 2023-08-30 | End: 2023-08-30

## 2023-08-30 RX ORDER — SODIUM ZIRCONIUM CYCLOSILICATE 10 G/10G
10 POWDER, FOR SUSPENSION ORAL ONCE
Refills: 0 | Status: COMPLETED | OUTPATIENT
Start: 2023-08-30 | End: 2023-08-30

## 2023-08-30 RX ORDER — DEXTROSE 50 % IN WATER 50 %
15 SYRINGE (ML) INTRAVENOUS ONCE
Refills: 0 | Status: DISCONTINUED | OUTPATIENT
Start: 2023-08-30 | End: 2023-09-13

## 2023-08-30 RX ORDER — NYSTATIN CREAM 100000 [USP'U]/G
1 CREAM TOPICAL EVERY 12 HOURS
Refills: 0 | Status: DISCONTINUED | OUTPATIENT
Start: 2023-08-30 | End: 2023-09-13

## 2023-08-30 RX ORDER — TETANUS TOXOID, REDUCED DIPHTHERIA TOXOID AND ACELLULAR PERTUSSIS VACCINE, ADSORBED 5; 2.5; 8; 8; 2.5 [IU]/.5ML; [IU]/.5ML; UG/.5ML; UG/.5ML; UG/.5ML
0.5 SUSPENSION INTRAMUSCULAR ONCE
Refills: 0 | Status: COMPLETED | OUTPATIENT
Start: 2023-08-30 | End: 2023-08-30

## 2023-08-30 RX ORDER — INSULIN GLARGINE 100 [IU]/ML
15 INJECTION, SOLUTION SUBCUTANEOUS AT BEDTIME
Refills: 0 | Status: DISCONTINUED | OUTPATIENT
Start: 2023-08-30 | End: 2023-09-05

## 2023-08-30 RX ORDER — INSULIN GLARGINE 100 [IU]/ML
15 INJECTION, SOLUTION SUBCUTANEOUS EVERY MORNING
Refills: 0 | Status: DISCONTINUED | OUTPATIENT
Start: 2023-08-31 | End: 2023-09-05

## 2023-08-30 RX ORDER — LORATADINE 10 MG/1
10 TABLET ORAL DAILY
Refills: 0 | Status: DISCONTINUED | OUTPATIENT
Start: 2023-08-30 | End: 2023-09-13

## 2023-08-30 RX ORDER — DEXTROSE 50 % IN WATER 50 %
12.5 SYRINGE (ML) INTRAVENOUS ONCE
Refills: 0 | Status: DISCONTINUED | OUTPATIENT
Start: 2023-08-30 | End: 2023-09-13

## 2023-08-30 RX ORDER — INSULIN LISPRO 100/ML
VIAL (ML) SUBCUTANEOUS
Refills: 0 | Status: DISCONTINUED | OUTPATIENT
Start: 2023-08-30 | End: 2023-09-13

## 2023-08-30 RX ORDER — HEPARIN SODIUM 5000 [USP'U]/ML
10000 INJECTION INTRAVENOUS; SUBCUTANEOUS ONCE
Refills: 0 | Status: COMPLETED | OUTPATIENT
Start: 2023-08-30 | End: 2023-08-30

## 2023-08-30 RX ORDER — INSULIN LISPRO 100/ML
VIAL (ML) SUBCUTANEOUS AT BEDTIME
Refills: 0 | Status: DISCONTINUED | OUTPATIENT
Start: 2023-08-30 | End: 2023-09-13

## 2023-08-30 RX ORDER — HEPARIN SODIUM 5000 [USP'U]/ML
INJECTION INTRAVENOUS; SUBCUTANEOUS
Qty: 25000 | Refills: 0 | Status: DISCONTINUED | OUTPATIENT
Start: 2023-08-30 | End: 2023-09-06

## 2023-08-30 RX ORDER — FUROSEMIDE 40 MG
40 TABLET ORAL ONCE
Refills: 0 | Status: DISCONTINUED | OUTPATIENT
Start: 2023-08-30 | End: 2023-08-30

## 2023-08-30 RX ORDER — OXYCODONE HYDROCHLORIDE 5 MG/1
60 TABLET ORAL EVERY 12 HOURS
Refills: 0 | Status: DISCONTINUED | OUTPATIENT
Start: 2023-08-30 | End: 2023-09-06

## 2023-08-30 RX ORDER — FUROSEMIDE 40 MG
40 TABLET ORAL EVERY 12 HOURS
Refills: 0 | Status: DISCONTINUED | OUTPATIENT
Start: 2023-08-31 | End: 2023-09-01

## 2023-08-30 RX ORDER — POLYETHYLENE GLYCOL 3350 17 G/17G
17 POWDER, FOR SOLUTION ORAL
Refills: 0 | DISCHARGE

## 2023-08-30 RX ORDER — GLUCAGON INJECTION, SOLUTION 0.5 MG/.1ML
1 INJECTION, SOLUTION SUBCUTANEOUS ONCE
Refills: 0 | Status: DISCONTINUED | OUTPATIENT
Start: 2023-08-30 | End: 2023-09-13

## 2023-08-30 RX ORDER — IPRATROPIUM/ALBUTEROL SULFATE 18-103MCG
3 AEROSOL WITH ADAPTER (GRAM) INHALATION EVERY 6 HOURS
Refills: 0 | Status: DISCONTINUED | OUTPATIENT
Start: 2023-08-30 | End: 2023-09-13

## 2023-08-30 RX ORDER — CHOLECALCIFEROL (VITAMIN D3) 125 MCG
2000 CAPSULE ORAL DAILY
Refills: 0 | Status: DISCONTINUED | OUTPATIENT
Start: 2023-08-30 | End: 2023-09-13

## 2023-08-30 RX ORDER — HEPARIN SODIUM 5000 [USP'U]/ML
10000 INJECTION INTRAVENOUS; SUBCUTANEOUS EVERY 6 HOURS
Refills: 0 | Status: DISCONTINUED | OUTPATIENT
Start: 2023-08-30 | End: 2023-09-06

## 2023-08-30 RX ADMIN — HEPARIN SODIUM 2400 UNIT(S)/HR: 5000 INJECTION INTRAVENOUS; SUBCUTANEOUS at 18:51

## 2023-08-30 RX ADMIN — OXYCODONE HYDROCHLORIDE 60 MILLIGRAM(S): 5 TABLET ORAL at 22:01

## 2023-08-30 RX ADMIN — Medication 40 MILLIGRAM(S): at 23:25

## 2023-08-30 RX ADMIN — TETANUS TOXOID, REDUCED DIPHTHERIA TOXOID AND ACELLULAR PERTUSSIS VACCINE, ADSORBED 0.5 MILLILITER(S): 5; 2.5; 8; 8; 2.5 SUSPENSION INTRAMUSCULAR at 17:21

## 2023-08-30 RX ADMIN — ATORVASTATIN CALCIUM 40 MILLIGRAM(S): 80 TABLET, FILM COATED ORAL at 22:02

## 2023-08-30 RX ADMIN — Medication 100 MILLIGRAM(S): at 22:48

## 2023-08-30 RX ADMIN — HEPARIN SODIUM 10000 UNIT(S): 5000 INJECTION INTRAVENOUS; SUBCUTANEOUS at 18:51

## 2023-08-30 RX ADMIN — OXYCODONE HYDROCHLORIDE 60 MILLIGRAM(S): 5 TABLET ORAL at 23:43

## 2023-08-30 RX ADMIN — Medication 12.5 GRAM(S): at 23:17

## 2023-08-30 RX ADMIN — SODIUM ZIRCONIUM CYCLOSILICATE 10 GRAM(S): 10 POWDER, FOR SUSPENSION ORAL at 22:02

## 2023-08-30 NOTE — ED ADULT NURSE NOTE - OBJECTIVE STATEMENT
Patient was felling unsteady and like was going to fall, pt fell previously and has laceration to right foot.

## 2023-08-30 NOTE — H&P ADULT - PROBLEM SELECTOR PLAN 2
first trop 0.09, no active cp, will trend, asa, statin on board, hold b.ailink has h/o asthma, follow cardiology team recommendations.  Cr 1.89 , renally related + trop ?   follow echo

## 2023-08-30 NOTE — H&P ADULT - PROBLEM SELECTOR PLAN 6
rt. foot wound above 5th toe , recently got skin laceration, will keep on doxycycline and request day team to consult podiatry.

## 2023-08-30 NOTE — ED ADULT NURSE NOTE - ED STAT RN HANDOFF DETAILS
pt stable NAD VSS on 4LNC NSR on CM pt transported on tele per orders. RR even unlabored fall and safety precautions in place transported without incident

## 2023-08-30 NOTE — H&P ADULT - HISTORY OF PRESENT ILLNESS
67 y/o morbidly obese female with h/o asthma on home o2, HFp EF, spinal stenosis, dm, ckd, , dvt lt. leg in 2004, treated and resolved came in for increasing sob for past 1 week. pt. stated that she has baseline chronic sob but last week was more than her baseline. As per pt. she gets oob and uses walker. no cp. no abd. pain . no n/v/d. no fever. pt. stated that she recently got a  small cut on her rt. foot  while getting OOB, podiatrist came to the house and glu was applied to the cut and her doctor put her on augmentin for 10 days which she used for 3 days but today came to the ER. no other complaints reported.   In the ER  first trop 0.09 , d dimer 407, due to pt's weight she exceeds the wt. limit for ct chest so ER physician started pt. on iv heparin. Lower ext. doppler has been ordered by the ER physician as well.

## 2023-08-30 NOTE — ED PROVIDER NOTE - CARE PLAN
Other postprocedural status  History of dilatation and curettage   1 Principal Discharge DX:	NSTEMI (non-ST elevation myocardial infarction)  Secondary Diagnosis:	Shortness of breath  Secondary Diagnosis:	Chronic kidney disease, unspecified CKD stage

## 2023-08-30 NOTE — ED ADULT NURSE REASSESSMENT NOTE - NS ED NURSE REASSESS COMMENT FT1
Received pt from  area. Noted to have a d-dimer of 407 and heparin ordered. Pt is a bariatric patient estimated to be heavier than 300lbs/136kgs. Weight not obtained prior to placing patient on bariatric bed. Pharmacist and MD aware, both acknowledge pt is over the heparin nomogram maximum weight. Heparin started with 2 nurse verification.
pt received from Ladi SANCHEZ NAD heparin gtt running @24 per order set pt NSR on cardiac monitor bp 123/56 pt denies chest pain sob. Pt admitted to tele and pending bed assignment at this time, fall and safety precautions in place

## 2023-08-30 NOTE — H&P ADULT - PROBLEM SELECTOR PLAN 4
pt's Blood gluocse 75 on arrival, will keep on less dose of lantus , admelog scale and adjust per BG response.

## 2023-08-30 NOTE — CONSULT NOTE ADULT - PROBLEM SELECTOR RECOMMENDATION 9
-symptoms related to combination of CHF exacerbation, pHTN, morbid obesity and asthma  -telemetry monitoring  -Lasix 40 mg IV bid  -monitor renal function and electrolytes  -strict I&os  -TTE in am  -O2 supplementation to maintain SpO2>93%

## 2023-08-30 NOTE — ED PROVIDER NOTE - ATTENDING CONTRIBUTION TO CARE
I, Uma Canseco, have personally seen and examined this patient. I have fully participated in the care of this patient. I have reviewed all pertinent clinical information, including history, physical exam, plan and the Resident's note and agree except as noted below.    68-year-old female with heart failure on 3 L home O2 at baseline patient is morbidly obese with a BMI greater than 70.  Has had progressive weakness and shortness of breath.  Remote history of DVT not on a blood thinner.  On arrival patient was on a nonrebreather from EMS was titrated down to her 3 L O2 noted to range from 90% to 94%.  Mild tachypnea but speaking in full sentences.  Decreased breath sounds bilateral but no crackles appreciated regular rate and rhythm.  Patient with large pannus with anasarca.  Bilateral lower extremity swelling +2 with chronic venous stasis changes does have a laceration to her right foot from an injury that was not repaired.  No deformity.  No sign of infection.  Labs notable for worsening CKD has an elevation in her troponin BNP unclear if related to CKD or CHF no B-lines on bedside sono D-dimer also elevated for age unable to get CT due to size will start heparin cards consulted duplex of lower extremities ordered admitted to medicine

## 2023-08-30 NOTE — PROVIDER CONTACT NOTE (HYPOGLYCEMIA EVENT) - NS PROVIDER CONTACT BACKGROUND-HYPO
Age: 68y    Gender: Female    POCT Blood Glucose:  85 mg/dL (08-30-23 @ 23:34)  51 mg/dL (08-30-23 @ 23:11)      eMAR:atorvastatin   40 milliGRAM(s) Oral (08-30-23 @ 22:02)    dextrose 50% Injectable   12.5 Gram(s) IV Push (08-30-23 @ 23:17)

## 2023-08-30 NOTE — H&P ADULT - NSHPLANGLIMITEDENGLISH_GEN_A_CORE
[facility] [address]     [address] [place] [address] and spoke with Lalit Marshall Dr Naomi [place]  [phone]    Jenae Baltazar MD Pulmonary Disease Schedule an appointment as soon as possible for a visit   [phone]      [facility]  Schedule an appointment as soon as possible for a visit   [phone]  Schedule an appointment as soon as possible for a visit   [address], [address] Dave MD Oncology Call   [address] [place] [address]  [phone]            No

## 2023-08-30 NOTE — H&P ADULT - PROBLEM SELECTOR PLAN 1
likely multifactorial, morbid obesity, chronic sob, asthma all contributing, chf exacerbation ? pt. likely has chronic edema as well. follow echo, will keep on tele and iv lasix 40 mg bid for now and too be adjusted per renal function.   cardiology [place] to follow.  -d dimer 407, due to pt's weight she exceeds the wt. limit for ct chest so ER physician started pt. on iv heparin, venous doppler of lower ext. pending.

## 2023-08-30 NOTE — CONSULT NOTE ADULT - SUBJECTIVE AND OBJECTIVE BOX
[facility]                                              CARDIOLOGY AT [address], [place] [address]                                             Telephone: [phone]. Fax:[phone]                                                       CARDIOLOGY CONSULTATION NOTE                                                                                             History obtained by: Patient and medical record  Community Cardiologist: Dr. Hemal Ge as per pt   obtained: Yes [  ] No [x  ]  Reason for Consultation: CHF  Available out pt records reviewed: Yes [x  ] No [  ]    Chief complaint:  "I couldn't breathe"      HPI:  This is 69 y/o female with hx of HFpEF, on home O2 4L, pulmonary HTN, morbid obesity, asthma, CKD3, IDDM2, uterine cancer s/p MEGAN, DVT LLE (2004), chronic leg edema 2/2 venous stasis, spinal stenosis with chronic back pain who presents with SOB. Pt states that she has chronic difficulty breathing but it got even worse than normal over the past week. Pt ambulates with a walker. She fell and sustained right foot laceration a few days ago. Today she wasn't able to get out of bed due to dyspnea and pain and family called EMS. In the ED pt found with elevated D-dimer (407) and troponin  (0.09) and was started on Heparin drip. Cr 1.89, proBNP 1623. Pt follows with a cardiologist from [place]. Pt states that she's been having daily, intermittent chest pain for years without any recent change. She had an abnormal stress test in the past but no cardiac catheterization was performed. Echocardiogram in 10/2022 showed EF 50-55% and no significant valvular abnormalities.       CARDIAC TESTING   ECHO:  < from: TTE with Doppler (w/Cont) (10.19.22 @ 15:33) >  Observations:  Mitral Valve: Normal mitral valve.  Aortic Valve/Aorta: Normal aortic valve. .  Normal aortic root.  Left Atrium: Mild left atrial enlargement.  Left Ventricle: Endocardial visualization enhanced with  intravenous injection of Ultrasonic Enhancing Agent  (Definity).  Normal left ventricular internal dimensions and wall  thicknesses.  Estimated ejection fraction 50-55%.  Probably normal diastolic function.  Right Heart: Probably normal right ventricular size and  systolic function.  Pericardium/Pleura: Normal pericardium with no pericardial  effusion.  Hemodynamic:  ------------------------------------------------------------------------  Conclusions:  Endocardial visualization enhanced with intravenous  injection of Ultrasonic Enhancing Agent (Definity).  Normal left ventricular internal dimensions and wall  thicknesses.  Estimated ejection fraction 50-55%.  ------------------------------------------------------------------------  Confirmed on  10/19/2022 - 17:30:45 by Martin Segal MD,  SHAWNA    < end of copied text >      STRESS: n/a    CATH: n/a    ELECTROPHYSIOLOGY: n/a    PAST MEDICAL HISTORY  Diabetes Mellitus Type II    HTN (Hypertension)    Endometrial Hyperplasia    Cervical Stenosis of Spine    Spinal Stenosis, Lumbar    Deep Vein Thrombosis (DVT)    Dyslipidemia    Cataract    Morbid Obesity    Vitamin D deficiency    Edema of lower extremity    Insomnia    CKD (chronic kidney disease)    Narrow angle glaucoma of left eye    Other fecal abnormalities    Congestive heart failure    Uterine cancer    Asthma    On home oxygen therapy    Gait difficulty        PAST SURGICAL HISTORY  Cataract extracted with lens implant 1998    C Section 1994    Cholecystectomy/appendectomy @ age 25    D&C x2 1980's    D&C 2008    Cervical Spinal Stenosis surgery x2 (01/2002, 7/2002)    Tonsillectomy as a child    Endometrial biopsy 12/02/09    H/O laser iridotomy    H/O colonoscopy    S/P total abdominal hysterectomy and bilateral salpingo-oophorectomy    S/P appendectomy    S/P cholecystectomy        SOCIAL HISTORY:  Denies smoking/alcohol/drugs  CIGARETTES:     ALCOHOL:  DRUGS:    FAMILY HISTORY:  Family history of pancreatic cancer    Family history of bladder cancer    Family history of lung cancer (Grandparent)      Family History of Cardiovascular Disease:  Yes [  ] No [  ]  Coronary Artery Disease in first degree relative: Yes [  ] No [  ]  Sudden Cardiac Death in First degree relative: Yes [  ] No [  ]    HOME MEDICATIONS:  ADVAIR -21 MCG INHALER: TAKE 2 PUFFS BY MOUTH TWICE A DAY (13 Oct 2022 13:16)  Aspirin Enteric Coated 81 mg oral delayed release tablet: 1 tab(s) orally once a day (13 Oct 2022 13:16)  furosemide 40 mg oral tablet: 1 tab(s) orally once a day (26 Oct 2022 13:09)  lisinopril 5 mg oral tablet: 1 tab(s) orally once a day (30 Aug 2023 20:40)  loratadine 10 mg oral tablet: 1 tab(s) orally once a day (26 Oct 2022 13:09)  NovoLOG FlexPen 100 units/mL injectable solution: 22 unit(s) injectable 3 times a day (before meals) (26 Oct 2022 15:04)  nystatin 100,000 units/g topical powder: 1 application topically 2 times a day (13 Oct 2022 13:16)  ondansetron 4 mg oral tablet: 1 tab(s) orally 3 times a day, As Needed (13 Oct 2022 13:16)  OxyCONTIN 60 mg oral tablet, extended release: 1 tab(s) orally every 12 hours (13 Oct 2022 13:16)  polyethylene glycol 3350 oral powder for reconstitution: 17 gram(s) orally once a day (30 Aug 2023 20:37)  ProAir HFA 90 mcg/inh inhalation aerosol: 2 puff(s) inhaled every 6 hours, As Needed (13 Oct 2022 13:16)  rosuvastatin 20 mg oral tablet: 1 tab(s) orally once a day (at bedtime) (13 Oct 2022 13:16)  Toujeo SoloStar 300 units/mL subcutaneous solution: 60 unit(s) subcutaneous 2 times a day - once in AM and once at bedtime (26 Oct 2022 15:04)  Vitamin D2 2000 intl units (50 mcg) oral capsule: 1 cap(s) orally once a day (13 Oct 2022 13:16)      CURRENT CARDIAC MEDICATIONS:  furosemide   Injectable 40 milliGRAM(s) IV Push Once, Stop order after: 1 Doses      CURRENT OTHER MEDICATIONS:  albuterol    0.083% 2.5 milliGRAM(s) Nebulizer every 4 hours PRN Shortness of Breath and/or Wheezing  albuterol/ipratropium for Nebulization 3 milliLiter(s) Nebulizer every 6 hours  loratadine 10 milliGRAM(s) Oral daily  oxyCODONE  ER Tablet 60 milliGRAM(s) Oral every 12 hours  polyethylene glycol 3350 17 Gram(s) Oral daily  aspirin enteric coated 81 milliGRAM(s) Oral daily  atorvastatin 40 milliGRAM(s) Oral at bedtime  cholecalciferol 2000 Unit(s) Oral daily  dextrose 5%. 1000 milliLiter(s) (50 mL/Hr) IV Continuous <Continuous>  dextrose 5%. 1000 milliLiter(s) (100 mL/Hr) IV Continuous <Continuous>  dextrose 50% Injectable 25 Gram(s) IV Push once, Stop order after: 1 Doses  dextrose 50% Injectable 12.5 Gram(s) IV Push once, Stop order after: 1 Doses  dextrose 50% Injectable 25 Gram(s) IV Push once, Stop order after: 1 Doses  dextrose Oral Gel 15 Gram(s) Oral once, Stop order after: 1 Doses PRN Blood Glucose LESS THAN 70 milliGRAM(s)/deciliter  doxycycline IVPB 100 milliGRAM(s) IV Intermittent every 12 hours, Stop order after: 7 Days  glucagon  Injectable 1 milliGRAM(s) IntraMuscular once, Stop order after: 1 Doses  heparin   Injectable 29132 Unit(s) IV Push every 6 hours PRN For aPTT less than 40  heparin   Injectable 5000 Unit(s) IV Push every 6 hours PRN For aPTT between 40 - 57  heparin  Infusion.  Unit(s)/Hr (24 mL/Hr) IV Continuous <Continuous>  insulin glargine Injectable (LANTUS) 15 Unit(s) SubCutaneous at bedtime  insulin lispro (ADMELOG) corrective regimen sliding scale   SubCutaneous three times a day before meals  insulin lispro (ADMELOG) corrective regimen sliding scale   SubCutaneous at bedtime  nystatin Powder 1 Application(s) Topical every 12 hours  sodium zirconium cyclosilicate 10 Gram(s) Oral once, Stop order after: 1 Doses      ALLERGIES:   adhesives (Rash)  latex (Rash)  wool- rash, itch (Other)  Bactrim (Flushing)      REVIEW OF SYMPTOMS:   CONSTITUTIONAL: No fever, no chills, no weight loss, no weight gain, no fatigue   ENMT:  No vertigo; No sinus or throat pain  NECK: No pain or stiffness  CARDIOVASCULAR: as per HPI  RESPIRATORY: c/o Shortness of breath, no cough, no wheezing  : No dysuria, no hematuria   GI: No dark color stool, no nausea, no diarrhea, no constipation, no abdominal pain   NEURO: No headache, no slurred speech   MUSCULOSKELETAL: No joint pain or swelling; No muscle, back, or extremity pain  PSYCH: No agitation, no anxiety.    ALL OTHER REVIEW OF SYSTEMS ARE NEGATIVE.    VITAL SIGNS:  T(C): 36.8 (08-30-23 @ 19:18), Max: 36.8 (08-30-23 @ 19:18)  T(F): 98.2 (08-30-23 @ 19:18), Max: 98.2 (08-30-23 @ 19:18)  HR: 74 (08-30-23 @ 19:18) (74 - 94)  BP: 123/56 (08-30-23 @ 20:23) (123/56 - 158/72)  RR: 77 (08-30-23 @ 20:23) (17 - 77)  SpO2: 95% (08-30-23 @ 20:23) (95% - 99%)    INTAKE AND OUTPUT:       PHYSICAL EXAM:  Constitutional: Comfortable . No acute distress.   HEENT: Atraumatic and normocephalic , neck is supple . no JVD. No carotid bruit.  CNS: A&Ox3. No focal deficits.   Respiratory: diminished b/l  Cardiovascular: RRR normal s1 s2. No murmurs  Gastrointestinal: obese, Soft, non-tender. +Bowel sounds.   Extremities: 2+ Peripheral Pulses, No clubbing, cyanosis, LE +4 edema  Psychiatric: Calm . no agitation.   Skin: Warm and dry, + ulcers, redness and scaling on lower extremities     LABS:  ( 30 Aug 2023 17:13 )  Troponin T  0.09<H>,  CPK  X    , CKMB  X    , BNP X                                  11.0   9.58  )-----------( 229      ( 30 Aug 2023 17:13 )             37.4     08-30    143  |  102  |  39.9<H>  ----------------------------<  75  5.5<H>   |  30.0<H>  |  1.89<H>    Ca    9.3      30 Aug 2023 17:13    TPro  7.0  /  Alb  3.7  /  TBili  0.6  /  DBili  x   /  AST  26  /  ALT  18  /  AlkPhos  222<H>  08-30    PT/INR - ( 30 Aug 2023 17:13 )   PT: 11.8 sec;   INR: 1.07 ratio         PTT - ( 30 Aug 2023 17:13 )  PTT:30.6 sec  Urinalysis Basic - ( 30 Aug 2023 17:13 )    Color: x / Appearance: x / SG: x / pH: x  Gluc: 75 mg/dL / Ketone: x  / Bili: x / Urobili: x   Blood: x / Protein: x / Nitrite: x   Leuk Esterase: x / RBC: x / WBC x   Sq Epi: x / Non Sq Epi: x / Bacteria: x        INTERPRETATION OF TELEMETRY: SR 70's, no events    ECG: SR 76 bpm, nonspecific T-wave abnormality  Prior ECG: Yes [ x ] No [  ]    RADIOLOGY & ADDITIONAL STUDIES: n/a   X-ray:    CT scan:   MRI:   US:

## 2023-08-30 NOTE — ED PROVIDER NOTE - OBJECTIVE STATEMENT
67 y/o F pt w/ pmhx of chf and morbid obesity BIB EMS for SOB and dizziness. Was unable to get to commode today. Uses 3L o2 at home. Also notes a fall a few weeks ago and has a cut on her R foot. She denies fever, chills, chest pain, back pain, abd pain, N/V/D, leg pain, numbness, tingling, focal weakness, or any other complaints.

## 2023-08-30 NOTE — CONSULT NOTE ADULT - ASSESSMENT
This is 69 y/o female with hx of HFpEF, on home O2 4L, pulmonary HTN, morbid obesity, asthma, CKD3, IDDM2, uterine cancer s/p MEGAN, DVT LLE (2004), chronic leg edema 2/2 venous stasis, spinal stenosis with chronic back pain who presents with SOB. Pt states that she has chronic difficulty breathing but it got even worse than normal over the past week. Pt ambulates with a walker. She fell and sustained right foot laceration a few days ago. Today she wasn't able to get out of bed due to dyspnea and pain and family called EMS. In the ED pt found with elevated D-dimer (407) and troponin  (0.09) and was started on Heparin drip. Cr 1.89, proBNP 1623. Pt follows with a cardiologist from [place]. Pt states that she's been having daily, intermittent chest pain for years without any recent change. She had an abnormal stress test in the past but no cardiac catheterization was performed. Echocardiogram in 10/2022 showed EF 50-55% and no significant valvular abnormalities.

## 2023-08-30 NOTE — CONSULT NOTE ADULT - PROBLEM SELECTOR RECOMMENDATION 2
-likely demand ischemia  -trend cardiac markers  -no change in chest pain from baseline  -will consider pharmacologic NST

## 2023-08-30 NOTE — ED ADULT TRIAGE NOTE - CHIEF COMPLAINT QUOTE
pt BIBA from home, was trying to get to the bathroom and was unable to, upon EMS arrival pt was sitting on edge of bed, about to fall out, feeling dizzy and short of breath. pt also states she has an open wound to her foot.

## 2023-08-30 NOTE — H&P ADULT - NSHPPHYSICALEXAM_GEN_ALL_CORE
Vital Signs Last 24 Hrs  T(C): 36.8 (30 Aug 2023 19:18), Max: 36.8 (30 Aug 2023 19:18)  T(F): 98.2 (30 Aug 2023 19:18), Max: 98.2 (30 Aug 2023 19:18)  HR: 74 (30 Aug 2023 19:18) (74 - 94)  BP: 123/56 (30 Aug 2023 20:23) (123/56 - 158/72)  BP(mean): --  RR: 77 (30 Aug 2023 20:23) (17 - 77)  SpO2: 95% (30 Aug 2023 20:23) (95% - 99%)    Parameters below as of 30 Aug 2023 20:23  Patient On (Oxygen Delivery Method): nasal cannula  O2 Flow (L/min): 4    General: Morbidly obese female in bed not in distress  eyes : PERRL. intact EOM.   HENT: AT, NC. no throat erythema or exudate.   Neck: supple. no JVD.   Chest: air entry fair bilaterally, some basal crackles  Heart: S1,S2. RRR. no heart murmur. + edema of lower ext. b/L.   Abdomen: soft. non-tender. morbidly obese, + BS.   Ext: no calf tenderness. moves all ext. independently  vascular : distal pulses plapable  Neuro: AAO x3. no focal weakness. no speech disorder, cns intact  Skin: warm and dry, rt. foot above the 5th toe a small laceration noted, mild surrounding erythema, no discharge.  psych : mood ok, no si/hi Vital Signs Last 24 Hrs  T(C): 36.8 (30 Aug 2023 19:18), Max: 36.8 (30 Aug 2023 19:18)  T(F): 98.2 (30 Aug 2023 19:18), Max: 98.2 (30 Aug 2023 19:18)  HR: 74 (30 Aug 2023 19:18) (74 - 94)  BP: 123/56 (30 Aug 2023 20:23) (123/56 - 158/72)  BP(mean): --  RR: 77 (30 Aug 2023 20:23) (17 - 77)  SpO2: 95% (30 Aug 2023 20:23) (95% - 99%)    Parameters below as of 30 Aug 2023 20:23  Patient On (Oxygen Delivery Method): nasal cannula  O2 Flow (L/min): 4    General: Morbidly obese female in bed not in distress  eyes : PERRL. intact EOM.   HENT: AT, NC. no throat erythema or exudate.   Neck: supple. no JVD.   Chest: air entry fair bilaterally, some basal crackles  Heart: S1,S2. RRR. no heart murmur. + edema of lower ext. b/L.   Abdomen: soft. non-tender. morbidly obese, + BS.   Ext: no calf tenderness. moves all ext. independently  vascular : distal pulses plapable  Neuro: AAO x3. no focal weakness. no speech disorder, cns intact  Skin: rt. abdominal fold area and under rt. breast area with skin maceration, stage 1-2 sacral decubitus,  rt. foot above the 5th toe a small laceration noted, mild surrounding erythema, no discharge.  psych : mood ok, no si/hi

## 2023-08-30 NOTE — ED ADULT NURSE NOTE - NSFALLHARMRISKINTERV_ED_ALL_ED

## 2023-08-30 NOTE — CONSULT NOTE ADULT - ATTENDING COMMENTS
Acute on chronic diastolic congestive heart failure: SOB, multifactorial, symptoms related to combination of CHF exacerbation, PH, morbid obesity and asthma. telemetry monitoring, IV Diuretics, Serial CE, EKG.  TTE to evaluate for PH status to determine further JUNIOR for PH since no documentation.   Elevated troponin level in CKD setting. likely demand ischemia. Eventual ischemia JUNIOR.  Elevated d-dimer: CTA not performed due to weight limit and CKD3. b/l LE duplex. continue Heparin drip.

## 2023-08-30 NOTE — H&P ADULT - PROBLEM SELECTOR PLAN 5
Cr 1.89 somewhat close to baseline, avoid nephrotoxic meds, hold lisinopril for now  close f/u on Cr while on iv lasix  nephrology consult if Cr trending up.

## 2023-08-31 DIAGNOSIS — E66.2 MORBID (SEVERE) OBESITY WITH ALVEOLAR HYPOVENTILATION: ICD-10-CM

## 2023-08-31 DIAGNOSIS — J96.21 ACUTE AND CHRONIC RESPIRATORY FAILURE WITH HYPOXIA: ICD-10-CM

## 2023-08-31 LAB
A1C WITH ESTIMATED AVERAGE GLUCOSE RESULT: 6.5 % — HIGH (ref 4–5.6)
ANION GAP SERPL CALC-SCNC: 11 MMOL/L — SIGNIFICANT CHANGE UP (ref 5–17)
APTT BLD: 101.8 SEC — HIGH (ref 24.5–35.6)
APTT BLD: 176.3 SEC — CRITICAL HIGH (ref 24.5–35.6)
APTT BLD: >200 SEC — CRITICAL HIGH (ref 24.5–35.6)
BASOPHILS # BLD AUTO: 0.1 K/UL — SIGNIFICANT CHANGE UP (ref 0–0.2)
BASOPHILS NFR BLD AUTO: 1.1 % — SIGNIFICANT CHANGE UP (ref 0–2)
BUN SERPL-MCNC: 39.1 MG/DL — HIGH (ref 8–20)
CALCIUM SERPL-MCNC: 9.4 MG/DL — SIGNIFICANT CHANGE UP (ref 8.4–10.5)
CHLORIDE SERPL-SCNC: 101 MMOL/L — SIGNIFICANT CHANGE UP (ref 96–108)
CO2 SERPL-SCNC: 32 MMOL/L — HIGH (ref 22–29)
CREAT SERPL-MCNC: 2.06 MG/DL — HIGH (ref 0.5–1.3)
EGFR: 26 ML/MIN/1.73M2 — LOW
EOSINOPHIL # BLD AUTO: 0.37 K/UL — SIGNIFICANT CHANGE UP (ref 0–0.5)
EOSINOPHIL NFR BLD AUTO: 4.1 % — SIGNIFICANT CHANGE UP (ref 0–6)
ESTIMATED AVERAGE GLUCOSE: 140 MG/DL — HIGH (ref 68–114)
GLUCOSE BLDC GLUCOMTR-MCNC: 148 MG/DL — HIGH (ref 70–99)
GLUCOSE BLDC GLUCOMTR-MCNC: 167 MG/DL — HIGH (ref 70–99)
GLUCOSE BLDC GLUCOMTR-MCNC: 75 MG/DL — SIGNIFICANT CHANGE UP (ref 70–99)
GLUCOSE BLDC GLUCOMTR-MCNC: 79 MG/DL — SIGNIFICANT CHANGE UP (ref 70–99)
GLUCOSE SERPL-MCNC: 79 MG/DL — SIGNIFICANT CHANGE UP (ref 70–99)
HCT VFR BLD CALC: 36.1 % — SIGNIFICANT CHANGE UP (ref 34.5–45)
HCT VFR BLD CALC: 36.6 % — SIGNIFICANT CHANGE UP (ref 34.5–45)
HGB BLD-MCNC: 10.9 G/DL — LOW (ref 11.5–15.5)
HGB BLD-MCNC: 10.9 G/DL — LOW (ref 11.5–15.5)
IMM GRANULOCYTES NFR BLD AUTO: 1.2 % — HIGH (ref 0–0.9)
LYMPHOCYTES # BLD AUTO: 1.56 K/UL — SIGNIFICANT CHANGE UP (ref 1–3.3)
LYMPHOCYTES # BLD AUTO: 17.1 % — SIGNIFICANT CHANGE UP (ref 13–44)
MCHC RBC-ENTMCNC: 28 PG — SIGNIFICANT CHANGE UP (ref 27–34)
MCHC RBC-ENTMCNC: 28.3 PG — SIGNIFICANT CHANGE UP (ref 27–34)
MCHC RBC-ENTMCNC: 29.8 GM/DL — LOW (ref 32–36)
MCHC RBC-ENTMCNC: 30.2 GM/DL — LOW (ref 32–36)
MCV RBC AUTO: 93.8 FL — SIGNIFICANT CHANGE UP (ref 80–100)
MCV RBC AUTO: 94.1 FL — SIGNIFICANT CHANGE UP (ref 80–100)
MONOCYTES # BLD AUTO: 1 K/UL — HIGH (ref 0–0.9)
MONOCYTES NFR BLD AUTO: 11 % — SIGNIFICANT CHANGE UP (ref 2–14)
MRSA PCR RESULT.: SIGNIFICANT CHANGE UP
NEUTROPHILS # BLD AUTO: 5.96 K/UL — SIGNIFICANT CHANGE UP (ref 1.8–7.4)
NEUTROPHILS NFR BLD AUTO: 65.5 % — SIGNIFICANT CHANGE UP (ref 43–77)
PLATELET # BLD AUTO: 206 K/UL — SIGNIFICANT CHANGE UP (ref 150–400)
PLATELET # BLD AUTO: 232 K/UL — SIGNIFICANT CHANGE UP (ref 150–400)
POTASSIUM SERPL-MCNC: 4.9 MMOL/L — SIGNIFICANT CHANGE UP (ref 3.5–5.3)
POTASSIUM SERPL-MCNC: 5.5 MMOL/L — HIGH (ref 3.5–5.3)
POTASSIUM SERPL-SCNC: 4.9 MMOL/L — SIGNIFICANT CHANGE UP (ref 3.5–5.3)
POTASSIUM SERPL-SCNC: 5.5 MMOL/L — HIGH (ref 3.5–5.3)
RBC # BLD: 3.85 M/UL — SIGNIFICANT CHANGE UP (ref 3.8–5.2)
RBC # BLD: 3.89 M/UL — SIGNIFICANT CHANGE UP (ref 3.8–5.2)
RBC # FLD: 15.9 % — HIGH (ref 10.3–14.5)
RBC # FLD: 16.3 % — HIGH (ref 10.3–14.5)
S AUREUS DNA NOSE QL NAA+PROBE: SIGNIFICANT CHANGE UP
SODIUM SERPL-SCNC: 144 MMOL/L — SIGNIFICANT CHANGE UP (ref 135–145)
TROPONIN T SERPL-MCNC: 0.06 NG/ML — SIGNIFICANT CHANGE UP (ref 0–0.06)
TROPONIN T SERPL-MCNC: 0.08 NG/ML — HIGH (ref 0–0.06)
WBC # BLD: 9.1 K/UL — SIGNIFICANT CHANGE UP (ref 3.8–10.5)
WBC # BLD: 9.37 K/UL — SIGNIFICANT CHANGE UP (ref 3.8–10.5)
WBC # FLD AUTO: 9.1 K/UL — SIGNIFICANT CHANGE UP (ref 3.8–10.5)
WBC # FLD AUTO: 9.37 K/UL — SIGNIFICANT CHANGE UP (ref 3.8–10.5)

## 2023-08-31 PROCEDURE — 99233 SBSQ HOSP IP/OBS HIGH 50: CPT

## 2023-08-31 PROCEDURE — 93306 TTE W/DOPPLER COMPLETE: CPT | Mod: 26

## 2023-08-31 PROCEDURE — 99223 1ST HOSP IP/OBS HIGH 75: CPT

## 2023-08-31 PROCEDURE — 93970 EXTREMITY STUDY: CPT | Mod: 26

## 2023-08-31 RX ORDER — CHLORHEXIDINE GLUCONATE 213 G/1000ML
1 SOLUTION TOPICAL
Refills: 0 | Status: DISCONTINUED | OUTPATIENT
Start: 2023-08-31 | End: 2023-09-13

## 2023-08-31 RX ORDER — SODIUM ZIRCONIUM CYCLOSILICATE 10 G/10G
5 POWDER, FOR SUSPENSION ORAL ONCE
Refills: 0 | Status: COMPLETED | OUTPATIENT
Start: 2023-08-31 | End: 2023-08-31

## 2023-08-31 RX ORDER — BUDESONIDE AND FORMOTEROL FUMARATE DIHYDRATE 160; 4.5 UG/1; UG/1
2 AEROSOL RESPIRATORY (INHALATION)
Refills: 0 | Status: DISCONTINUED | OUTPATIENT
Start: 2023-08-31 | End: 2023-09-13

## 2023-08-31 RX ADMIN — Medication 100 MILLIGRAM(S): at 06:09

## 2023-08-31 RX ADMIN — HEPARIN SODIUM 2000 UNIT(S)/HR: 5000 INJECTION INTRAVENOUS; SUBCUTANEOUS at 07:54

## 2023-08-31 RX ADMIN — HEPARIN SODIUM 1600 UNIT(S)/HR: 5000 INJECTION INTRAVENOUS; SUBCUTANEOUS at 19:23

## 2023-08-31 RX ADMIN — INSULIN GLARGINE 15 UNIT(S): 100 INJECTION, SOLUTION SUBCUTANEOUS at 21:52

## 2023-08-31 RX ADMIN — Medication 40 MILLIGRAM(S): at 18:00

## 2023-08-31 RX ADMIN — OXYCODONE HYDROCHLORIDE 60 MILLIGRAM(S): 5 TABLET ORAL at 06:09

## 2023-08-31 RX ADMIN — HEPARIN SODIUM 0 UNIT(S)/HR: 5000 INJECTION INTRAVENOUS; SUBCUTANEOUS at 01:37

## 2023-08-31 RX ADMIN — Medication 3 MILLILITER(S): at 14:59

## 2023-08-31 RX ADMIN — HEPARIN SODIUM 2000 UNIT(S)/HR: 5000 INJECTION INTRAVENOUS; SUBCUTANEOUS at 02:39

## 2023-08-31 RX ADMIN — HEPARIN SODIUM 0 UNIT(S)/HR: 5000 INJECTION INTRAVENOUS; SUBCUTANEOUS at 11:00

## 2023-08-31 RX ADMIN — Medication 3 MILLILITER(S): at 20:29

## 2023-08-31 RX ADMIN — HEPARIN SODIUM 1300 UNIT(S)/HR: 5000 INJECTION INTRAVENOUS; SUBCUTANEOUS at 21:56

## 2023-08-31 RX ADMIN — ATORVASTATIN CALCIUM 40 MILLIGRAM(S): 80 TABLET, FILM COATED ORAL at 21:52

## 2023-08-31 RX ADMIN — Medication 100 MILLIGRAM(S): at 18:14

## 2023-08-31 RX ADMIN — OXYCODONE HYDROCHLORIDE 60 MILLIGRAM(S): 5 TABLET ORAL at 18:50

## 2023-08-31 RX ADMIN — Medication 40 MILLIGRAM(S): at 06:09

## 2023-08-31 RX ADMIN — POLYETHYLENE GLYCOL 3350 17 GRAM(S): 17 POWDER, FOR SOLUTION ORAL at 12:47

## 2023-08-31 RX ADMIN — SODIUM ZIRCONIUM CYCLOSILICATE 5 GRAM(S): 10 POWDER, FOR SUSPENSION ORAL at 01:53

## 2023-08-31 RX ADMIN — Medication 81 MILLIGRAM(S): at 12:46

## 2023-08-31 RX ADMIN — OXYCODONE HYDROCHLORIDE 60 MILLIGRAM(S): 5 TABLET ORAL at 07:24

## 2023-08-31 RX ADMIN — HEPARIN SODIUM 1300 UNIT(S)/HR: 5000 INJECTION INTRAVENOUS; SUBCUTANEOUS at 19:52

## 2023-08-31 RX ADMIN — NYSTATIN CREAM 1 APPLICATION(S): 100000 CREAM TOPICAL at 18:00

## 2023-08-31 RX ADMIN — HEPARIN SODIUM 1600 UNIT(S)/HR: 5000 INJECTION INTRAVENOUS; SUBCUTANEOUS at 12:07

## 2023-08-31 RX ADMIN — OXYCODONE HYDROCHLORIDE 60 MILLIGRAM(S): 5 TABLET ORAL at 18:01

## 2023-08-31 RX ADMIN — LORATADINE 10 MILLIGRAM(S): 10 TABLET ORAL at 12:47

## 2023-08-31 RX ADMIN — CHLORHEXIDINE GLUCONATE 1 APPLICATION(S): 213 SOLUTION TOPICAL at 18:00

## 2023-08-31 RX ADMIN — Medication 3 MILLILITER(S): at 03:42

## 2023-08-31 RX ADMIN — NYSTATIN CREAM 1 APPLICATION(S): 100000 CREAM TOPICAL at 06:11

## 2023-08-31 RX ADMIN — HEPARIN SODIUM 1600 UNIT(S)/HR: 5000 INJECTION INTRAVENOUS; SUBCUTANEOUS at 16:38

## 2023-08-31 RX ADMIN — Medication 2000 UNIT(S): at 12:47

## 2023-08-31 NOTE — ADVANCED PRACTICE NURSE CONSULT - ASSESSMENT
CWOCN consulted for Left chest wall wound from prior mastectomy.  Patient lethargic and falls asleep quickly,  made aware of  consult.  Dr. UMA Eng at bedside, visualized wound and made aware of recommendations.    ·	Left Chest Wall - Surgical Incision Wound ([id number]) - thick yellow/white moist firmly adherent slough to entire wound bed, malodorous with moderate seropurulent drainage, well-defined edges.  Periwound with blanching erythema, indurated lesion to distal aspect of wound.

## 2023-08-31 NOTE — CONSULT NOTE ADULT - ASSESSMENT
acute on chronic hypoxemic resp failure/OHS/ENRIQUE  HFpEF, severe Pulmonary Htn, never smoker hx "asthma"- PFts restrictive 2019  Morbid obesity- limited mobilty, hx Falls at home  CXR limited, duplex without DVT, D dimer minimal 407 ( normal 350 corrected for age)  Cr elevated, Current 560 precludes V/Q table- need accurate weight  02 requirement 4-5L appears near baseline, no bronchospasm on exam  Check baseline VBG  Trial of Bipap nocturnal with nasal mask- cant tolerate full face  short course of nebs, wean to prn, Symbicort with spacer bid  empiric abx/ short course of medrol  Diuresis  ? RHC , If wt acceptable  Given above fall risks, unclear risk/benefit of empiric AC from pulmonary perspective  Would D/C heparin  and use high dose prophylaxis  prognosis extremely guarded  needs continuous 02, monitored bed

## 2023-08-31 NOTE — CONSULT NOTE ADULT - SUBJECTIVE AND OBJECTIVE BOX
PULMONARY CONSULT NOTE      [id number]    Patient is a 68y old  Female who presents with a chief complaint of dyspnea, elevated troponin (30 Aug 2023 21:35)  This is 67 y/o female with hx of HFpEF, on home O2 4L, pulmonary HTN, morbid obesity, asthma, CKD3, IDDM2, uterine cancer s/p MEGAN, DVT LLE (2004), chronic leg edema 2/2 venous stasis, spinal stenosis with chronic back pain who presents with SOB. Pt states that she has chronic difficulty breathing but it got even worse than normal over the past week. Pt ambulates with a walker. She fell and sustained right foot laceration a few days ago. Today she wasn't able to get out of bed due to dyspnea and pain and family called EMS. In the ED pt found with elevated D-dimer (407) and troponin  (0.09) and was started on Heparin drip. Cr 1.89, proBNP 1623. Pt follows with a cardiologist from [place]. Pt states that she's been having daily, intermittent chest pain for years without any recent change. She had an abnormal stress test in the past but no cardiac catheterization was performed. Echocardiogram in 10/2022 showed EF 50-55% and no significant valvular abnormalities.       HISTORY OF PRESENT ILLNESS:  never smoker  intolerant of cpap in past  last PFts 2019- mod restriction, mod impaired DLCO  Treated as "asthma"  currently no cp or sputum  fell at home, unstable transfers  lives in room most of time, has toilet in room  02 4 L continuously  MEDICATIONS  (STANDING):  albuterol/ipratropium for Nebulization 3 milliLiter(s) Nebulizer every 6 hours  aspirin enteric coated 81 milliGRAM(s) Oral daily  atorvastatin 40 milliGRAM(s) Oral at bedtime  chlorhexidine 2% Cloths 1 Application(s) Topical <User Schedule>  cholecalciferol 2000 Unit(s) Oral daily  dextrose 5%. 1000 milliLiter(s) (50 mL/Hr) IV Continuous <Continuous>  dextrose 5%. 1000 milliLiter(s) (100 mL/Hr) IV Continuous <Continuous>  dextrose 50% Injectable 25 Gram(s) IV Push once  dextrose 50% Injectable 12.5 Gram(s) IV Push once  dextrose 50% Injectable 25 Gram(s) IV Push once  doxycycline monohydrate Capsule 100 milliGRAM(s) Oral every 12 hours  furosemide   Injectable 40 milliGRAM(s) IV Push every 12 hours  glucagon  Injectable 1 milliGRAM(s) IntraMuscular once  heparin  Infusion.  Unit(s)/Hr (24 mL/Hr) IV Continuous <Continuous>  insulin glargine Injectable (LANTUS) 15 Unit(s) SubCutaneous at bedtime  insulin glargine Injectable (LANTUS) 15 Unit(s) SubCutaneous every morning  insulin lispro (ADMELOG) corrective regimen sliding scale   SubCutaneous three times a day before meals  insulin lispro (ADMELOG) corrective regimen sliding scale   SubCutaneous at bedtime  loratadine 10 milliGRAM(s) Oral daily  nystatin Powder 1 Application(s) Topical every 12 hours  oxyCODONE  ER Tablet 60 milliGRAM(s) Oral every 12 hours  polyethylene glycol 3350 17 Gram(s) Oral daily      MEDICATIONS  (PRN):  albuterol    0.083% 2.5 milliGRAM(s) Nebulizer every 4 hours PRN Shortness of Breath and/or Wheezing  dextrose Oral Gel 15 Gram(s) Oral once PRN Blood Glucose LESS THAN 70 milliGRAM(s)/deciliter  heparin   Injectable 13665 Unit(s) IV Push every 6 hours PRN For aPTT less than 40  heparin   Injectable 5000 Unit(s) IV Push every 6 hours PRN For aPTT between 40 - 57      Allergies    adhesives (Rash)  latex (Rash)  wool- rash, itch (Other)  Bactrim (Flushing)    Intolerances        PAST MEDICAL & SURGICAL HISTORY:  Diabetes Mellitus Type II      HTN (Hypertension)      Endometrial Hyperplasia      Cervical Stenosis of Spine      Spinal Stenosis, Lumbar      Deep Vein Thrombosis (DVT)  Left leg, 2004, treated and resolved      Dyslipidemia      Cataract      Morbid Obesity      Vitamin D deficiency      Insomnia      CKD (chronic kidney disease)  ~ III      Congestive heart failure  ~ HFpEF      Uterine cancer      Asthma      On home oxygen therapy      Gait difficulty  ~ u ses walker      Cataract extracted with lens implant 1998  Right      C Section 1994      Cholecystectomy/appendectomy @ age 25      D&C x2 1980's      D&C 2008  hysteroscopy, endometrial hyperplasia, 2009      Cervical Spinal Stenosis surgery x2 (01/2002, 7/2002)      Tonsillectomy as a child      Endometrial biopsy 12/02/09      H/O laser iridotomy  left eye, 2016      H/O colonoscopy  1998      S/P total abdominal hysterectomy and bilateral salpingo-oophorectomy      S/P appendectomy      S/P cholecystectomy          FAMILY HISTORY:  Family history of pancreatic cancer    Family history of bladder cancer    Family history of lung cancer (Grandparent)        SOCIAL HISTORY  Smoking History:     REVIEW OF SYSTEMS:    CONSTITUTIONAL:  No fevers, chills, sweats    HEENT:  Eyes:  No diplopia or blurred vision. ENT:  No earache, sore throat or runny nose.    CARDIOVASCULAR:  No pressure, squeezing, tightness, or heaviness about the chest; no palpitations.    RESPIRATORY:  see HPI    GASTROINTESTINAL:  No abdominal pain, nausea, vomiting or diarrhea.    GENITOURINARY:  No dysuria, frequency or urgency.    NEUROLOGIC:  No paresthesias, fasciculations, seizures or weakness.    PSYCHIATRIC:  No disorder of thought or mood.    Vital Signs Last 24 Hrs  T(C): 36.5 (31 Aug 2023 12:08), Max: 36.8 (30 Aug 2023 19:18)  T(F): 97.7 (31 Aug 2023 12:08), Max: 98.2 (30 Aug 2023 19:18)  HR: 71 (31 Aug 2023 12:08) (71 - 94)  BP: 121/56 (31 Aug 2023 12:08) (111/61 - 158/72)  BP(mean): 78 (31 Aug 2023 12:08) (78 - 78)  RR: 19 (31 Aug 2023 12:08) (17 - 77)  SpO2: 96% (31 Aug 2023 12:08) (94% - 99%)    Parameters below as of 31 Aug 2023 12:08  Patient On (Oxygen Delivery Method): nasal cannula  O2 Flow (L/min): 4      PHYSICAL EXAMINATION:    GENERAL: The patient is a well-developed, well-nourished _in no apparent distress.     HEENT: Head is normocephalic and atraumatic. Extraocular muscles are intact. Mucous membranes are moist.     NECK: Supple.     LUNGS: moderate air entry bilat  without wheezing, rales, or rhonchi. Respirations unlabored    HEART: Regular rate and rhythm without murmur.    ABDOMEN: Soft, nontender, and nondistended.  No hepatosplenomegaly is noted.    EXTREMITIES: With edema.    NEUROLOGIC: Grossly intact.      LABS:                        10.9   9.10  )-----------( 232      ( 31 Aug 2023 10:25 )             36.6     08-31    144  |  101  |  39.1<H>  ----------------------------<  79  4.9   |  32.0<H>  |  2.06<H>    Ca    9.4      31 Aug 2023 10:25    TPro  7.0  /  Alb  3.7  /  TBili  0.6  /  DBili  x   /  AST  26  /  ALT  18  /  AlkPhos  222<H>  08-30    PT/INR - ( 30 Aug 2023 17:13 )   PT: 11.8 sec;   INR: 1.07 ratio         PTT - ( 31 Aug 2023 10:25 )  PTT:176.3 sec  Urinalysis Basic - ( 31 Aug 2023 10:25 )    Color: x / Appearance: x / SG: x / pH: x  Gluc: 79 mg/dL / Ketone: x  / Bili: x / Urobili: x   Blood: x / Protein: x / Nitrite: x   Leuk Esterase: x / RBC: x / WBC x   Sq Epi: x / Non Sq Epi: x / Bacteria: x        CARDIAC MARKERS ( 31 Aug 2023 10:25 )  x     / 0.06 ng/mL / x     / x     / x      CARDIAC MARKERS ( 31 Aug 2023 00:43 )  x     / 0.08 ng/mL / x     / x     / x      CARDIAC MARKERS ( 30 Aug 2023 17:13 )  x     / 0.09 ng/mL / x     / x     / x          D-Dimer Assay, Quantitative: 407 ng/mL DDU (08-30-23 @ 17:13)            MICROBIOLOGY:    RADIOLOGY & ADDITIONAL STUDIES:

## 2023-08-31 NOTE — CONSULT NOTE ADULT - ASSESSMENT
HPI:  67 y/o morbidly obese female with h/o asthma on home o2, HFp EF, spinal stenosis, dm, ckd, , dvt lt. leg in 2004, treated and resolved came in for increasing sob for past 1 week. pt. stated that she has baseline chronic sob but last week was more than her baseline. As per pt. she gets oob and uses walker. no cp. no abd. pain . no n/v/d. no fever. pt. stated that she recently got a  small cut on her rt. foot  while getting OOB, podiatrist came to the house and glu was applied to the cut and her doctor put her on augmentin for 10 days which she used for 3 days but today came to the ER. no other complaints reported.   In the ER  first trop 0.09 , d dimer 407, due to pt's weight she exceeds the wt. limit for ct chest so ER physician started pt. on iv heparin. Lower ext. doppler has been ordered by the ER physician as well.  (30 Aug 2023 18:41)      Podiatry HPI  Patient is a 68F with extensive pmhx as noted above with complaint of Right hallux laceration she sustained last Friday 8/25 after banging her toe against a metal bed frame. She stated her home podiatrist visited (Dr. Mccloud) who applied surgical glue to the cut and placed her on augmentin which she has been on prior to visiting ED. Patient denies any drainage from the wound and states that sutures were not put in since her podiatrist believed they may have ripped out upon wt bearing. Patient ambulates minimally and states she sometimes moves from bed to chair. Patient states she has had a Left heel ulceration for years which required past hospitalizations for abx. States her podiatrist has been taking care of that at home as well and denies any concern for infection for left heel wound. Patient denies any n/v/f however is being admitted for chest pain/SOB.     PMH:Diabetes Mellitus Type II    HTN (Hypertension)    Endometrial Hyperplasia    Cervical Stenosis of Spine    Spinal Stenosis, Lumbar    Deep Vein Thrombosis (DVT)    Dyslipidemia    Cataract    Morbid Obesity    Vitamin D deficiency    Edema of lower extremity    Insomnia    CKD (chronic kidney disease)    Narrow angle glaucoma of left eye    Other fecal abnormalities    Congestive heart failure    Uterine cancer    Asthma    On home oxygen therapy    Gait difficulty      Allergies: adhesives (Rash)  latex (Rash)  wool- rash, itch (Other)  Bactrim (Flushing)    Medications: albuterol    0.083% 2.5 milliGRAM(s) Nebulizer every 4 hours PRN  albuterol/ipratropium for Nebulization 3 milliLiter(s) Nebulizer every 6 hours  aspirin enteric coated 81 milliGRAM(s) Oral daily  atorvastatin 40 milliGRAM(s) Oral at bedtime  budesonide 160 MICROgram(s)/formoterol 4.5 MICROgram(s) Inhaler 2 Puff(s) Inhalation two times a day  chlorhexidine 2% Cloths 1 Application(s) Topical <User Schedule>  cholecalciferol 2000 Unit(s) Oral daily  dextrose 5%. 1000 milliLiter(s) IV Continuous <Continuous>  dextrose 5%. 1000 milliLiter(s) IV Continuous <Continuous>  dextrose 50% Injectable 12.5 Gram(s) IV Push once  dextrose 50% Injectable 25 Gram(s) IV Push once  dextrose 50% Injectable 25 Gram(s) IV Push once  dextrose Oral Gel 15 Gram(s) Oral once PRN  doxycycline monohydrate Capsule 100 milliGRAM(s) Oral every 12 hours  furosemide   Injectable 40 milliGRAM(s) IV Push every 12 hours  glucagon  Injectable 1 milliGRAM(s) IntraMuscular once  heparin   Injectable 5000 Unit(s) IV Push every 6 hours PRN  heparin   Injectable 82302 Unit(s) IV Push every 6 hours PRN  heparin  Infusion.  Unit(s)/Hr IV Continuous <Continuous>  insulin glargine Injectable (LANTUS) 15 Unit(s) SubCutaneous every morning  insulin glargine Injectable (LANTUS) 15 Unit(s) SubCutaneous at bedtime  insulin lispro (ADMELOG) corrective regimen sliding scale   SubCutaneous at bedtime  insulin lispro (ADMELOG) corrective regimen sliding scale   SubCutaneous three times a day before meals  loratadine 10 milliGRAM(s) Oral daily  nystatin Powder 1 Application(s) Topical every 12 hours  oxyCODONE  ER Tablet 60 milliGRAM(s) Oral every 12 hours  polyethylene glycol 3350 17 Gram(s) Oral daily  predniSONE   Tablet   Oral   predniSONE   Tablet 30 milliGRAM(s) Oral daily    FH:Family history of pancreatic cancer    Family history of bladder cancer    Family history of lung cancer (Grandparent)      PSX: Cataract extracted with lens implant 1998    C Section 1994    Cholecystectomy/appendectomy @ age 25    D&C x2 1980's    D&C 2008    Cervical Spinal Stenosis surgery x2 (01/2002, 7/2002)    Tonsillectomy as a child    Endometrial biopsy 12/02/09    H/O laser iridotomy    H/O colonoscopy    S/P total abdominal hysterectomy and bilateral salpingo-oophorectomy    S/P appendectomy    S/P cholecystectomy      SH: albuterol    0.083% 2.5 milliGRAM(s) Nebulizer every 4 hours PRN  albuterol/ipratropium for Nebulization 3 milliLiter(s) Nebulizer every 6 hours  aspirin enteric coated 81 milliGRAM(s) Oral daily  atorvastatin 40 milliGRAM(s) Oral at bedtime  budesonide 160 MICROgram(s)/formoterol 4.5 MICROgram(s) Inhaler 2 Puff(s) Inhalation two times a day  chlorhexidine 2% Cloths 1 Application(s) Topical <User Schedule>  cholecalciferol 2000 Unit(s) Oral daily  dextrose 5%. 1000 milliLiter(s) IV Continuous <Continuous>  dextrose 5%. 1000 milliLiter(s) IV Continuous <Continuous>  dextrose 50% Injectable 12.5 Gram(s) IV Push once  dextrose 50% Injectable 25 Gram(s) IV Push once  dextrose 50% Injectable 25 Gram(s) IV Push once  dextrose Oral Gel 15 Gram(s) Oral once PRN  doxycycline monohydrate Capsule 100 milliGRAM(s) Oral every 12 hours  furosemide   Injectable 40 milliGRAM(s) IV Push every 12 hours  glucagon  Injectable 1 milliGRAM(s) IntraMuscular once  heparin   Injectable 06874 Unit(s) IV Push every 6 hours PRN  heparin   Injectable 5000 Unit(s) IV Push every 6 hours PRN  heparin  Infusion.  Unit(s)/Hr IV Continuous <Continuous>  insulin glargine Injectable (LANTUS) 15 Unit(s) SubCutaneous at bedtime  insulin glargine Injectable (LANTUS) 15 Unit(s) SubCutaneous every morning  insulin lispro (ADMELOG) corrective regimen sliding scale   SubCutaneous three times a day before meals  insulin lispro (ADMELOG) corrective regimen sliding scale   SubCutaneous at bedtime  loratadine 10 milliGRAM(s) Oral daily  nystatin Powder 1 Application(s) Topical every 12 hours  oxyCODONE  ER Tablet 60 milliGRAM(s) Oral every 12 hours  polyethylene glycol 3350 17 Gram(s) Oral daily  predniSONE   Tablet   Oral   predniSONE   Tablet 30 milliGRAM(s) Oral daily      Vital Signs Last 24 Hrs  T(C): 36.5 (31 Aug 2023 12:08), Max: 36.8 (30 Aug 2023 19:18)  T(F): 97.7 (31 Aug 2023 12:08), Max: 98.2 (30 Aug 2023 19:18)  HR: 71 (31 Aug 2023 12:08) (71 - 82)  BP: 121/56 (31 Aug 2023 12:08) (111/61 - 137/59)  BP(mean): 78 (31 Aug 2023 12:08) (78 - 78)  RR: 19 (31 Aug 2023 12:08) (18 - 77)  SpO2: 96% (31 Aug 2023 12:08) (94% - 99%)    Parameters below as of 31 Aug 2023 15:01  Patient On (Oxygen Delivery Method): nasal cannula, 2L        LABS                        10.9   9.10  )-----------( 232      ( 31 Aug 2023 10:25 )             36.6               08-31    144  |  101  |  39.1<H>  ----------------------------<  79  4.9   |  32.0<H>  |  2.06<H>    Ca    9.4      31 Aug 2023 10:25    TPro  7.0  /  Alb  3.7  /  TBili  0.6  /  DBili  x   /  AST  26  /  ALT  18  /  AlkPhos  222<H>  08-30      ROS  REVIEW OF SYSTEMS: unremarkable outside HPI      PHYSICAL EXAM  LE Focused:    Vasc:  DP and PT faintly palpable 1/4 with pitting edema +1 to b/l LE, skin tg warm to warm b/l Le prox to dist   Derm: Right hallux laceration noted sub IPJ minimally closed with surgical glue with granular wound base and no drainage noted or PTB. No odor or crepitation. Left lateral heel fibrotic wound noted meausuring approx 3X2cm no PTB, no flucutance or crepitation, or concern for infection  Neuro: Protective sensation grossly diminished   MSK: non functional ambulation       IMAGING: ?xray  < from: US Duplex Venous Lower Ext Complete, Bilateral (08.31.23 @ 09:27) >  FINDINGS:    RIGHT:  Normal compressibility ofthe RIGHT common femoral and femoral vein.  Doppler examination shows normal spontaneous and phasic flow.  Popliteal vein and calf veins not visualized.    LEFT:  Normal compressibility of the LEFT.  Doppler examination shows normal spontaneous and phasic flow.  Popliteal vein and calf veins not visualized.    IMPRESSION:    Markedly limited visualization. Unable to perform compression. Popliteal   vein and calf veins not visualized.    No evidence of deep venous thrombosis in either lower extremity.    < end of copied text >  < from: Xray Foot AP + Lateral, Right (08.30.23 @ 23:18) >  FINDINGS:    There is moderate generalized swelling. Extensive degenerative changes   midfoot. No acute fracture or dislocation. No radiopaque foreign body.    Portable chest 5:12 PM compared with 10/12/2022    FINDINGS: 2 views submitted and are limited by rotation. Right lateral   base and left costophrenic angle is cut from film.    The heart size and mediastinumare enlarged which may be projectional.   Mild congestive changes. No pneumothorax. No gross fracture.    IMPRESSION: Limited chest x-ray with mild congestive changes. Recommend   follow-up examination    Moderate generalized swelling of the right foot. No evidence of fracture,   radiopaque foreign body, subcutaneous emphysema or osteomyelitis. If   warranted clinically obtain additional imaging.    --- End of Report ---    < end of copied text >      A:  Right hallux laceration  Left heel wound     P:   Patient evaluated and Chart reviewed   Discussed diagnosis and treatment with patient  Wound flushed with normal saline  Excisional debridement with 15 blade of Left lateral heel wound with 15 blade and sterile scissors down to and including subcutaneous tissue  Applied aquacell allevyn pad to L heel  applied xeroform DSD to R hallux wound  X-rays evaluated as noted above  Continue with antibiotics As Per ID  recc off loading cair boots to B/L heels   Offloading to bilateral Heels.   Discussed importance of daily foot examinations and proper shoe gear and to importance of lower Fasting Blood Glucose levels.   Podiatry to follow while in house.  Discussed with Attending Dr. Emanuel   HPI:  67 y/o morbidly obese female with h/o asthma on home o2, HFp EF, spinal stenosis, dm, ckd, , dvt lt. leg in 2004, treated and resolved came in for increasing sob for past 1 week. pt. stated that she has baseline chronic sob but last week was more than her baseline. As per pt. she gets oob and uses walker. no cp. no abd. pain . no n/v/d. no fever. pt. stated that she recently got a  small cut on her rt. foot  while getting OOB, podiatrist came to the house and glu was applied to the cut and her doctor put her on augmentin for 10 days which she used for 3 days but today came to the ER. no other complaints reported.   In the ER  first trop 0.09 , d dimer 407, due to pt's weight she exceeds the wt. limit for ct chest so ER physician started pt. on iv heparin. Lower ext. doppler has been ordered by the ER physician as well.  (30 Aug 2023 18:41)      Podiatry HPI  Patient is a 68F with extensive pmhx as noted above with complaint of Right hallux laceration she sustained last Friday 8/25 after banging her toe against a metal bed frame. She stated her home podiatrist visited (Dr. Mccloud) who applied surgical glue to the cut and placed her on augmentin which she has been on prior to visiting ED. Patient denies any drainage from the wound and states that sutures were not put in since her podiatrist believed they may have ripped out upon wt bearing. Patient ambulates minimally and states she sometimes moves from bed to chair. Patient states she has had a Left heel ulceration for years which required past hospitalizations for abx. States her podiatrist has been taking care of that at home as well and denies any concern for infection for left heel wound. Patient denies any n/v/f however is being admitted for chest pain/SOB.     PMH:Diabetes Mellitus Type II    HTN (Hypertension)    Endometrial Hyperplasia    Cervical Stenosis of Spine    Spinal Stenosis, Lumbar    Deep Vein Thrombosis (DVT)    Dyslipidemia    Cataract    Morbid Obesity    Vitamin D deficiency    Edema of lower extremity    Insomnia    CKD (chronic kidney disease)    Narrow angle glaucoma of left eye    Other fecal abnormalities    Congestive heart failure    Uterine cancer    Asthma    On home oxygen therapy    Gait difficulty      Allergies: adhesives (Rash)  latex (Rash)  wool- rash, itch (Other)  Bactrim (Flushing)    Medications: albuterol    0.083% 2.5 milliGRAM(s) Nebulizer every 4 hours PRN  albuterol/ipratropium for Nebulization 3 milliLiter(s) Nebulizer every 6 hours  aspirin enteric coated 81 milliGRAM(s) Oral daily  atorvastatin 40 milliGRAM(s) Oral at bedtime  budesonide 160 MICROgram(s)/formoterol 4.5 MICROgram(s) Inhaler 2 Puff(s) Inhalation two times a day  chlorhexidine 2% Cloths 1 Application(s) Topical <User Schedule>  cholecalciferol 2000 Unit(s) Oral daily  dextrose 5%. 1000 milliLiter(s) IV Continuous <Continuous>  dextrose 5%. 1000 milliLiter(s) IV Continuous <Continuous>  dextrose 50% Injectable 12.5 Gram(s) IV Push once  dextrose 50% Injectable 25 Gram(s) IV Push once  dextrose 50% Injectable 25 Gram(s) IV Push once  dextrose Oral Gel 15 Gram(s) Oral once PRN  doxycycline monohydrate Capsule 100 milliGRAM(s) Oral every 12 hours  furosemide   Injectable 40 milliGRAM(s) IV Push every 12 hours  glucagon  Injectable 1 milliGRAM(s) IntraMuscular once  heparin   Injectable 5000 Unit(s) IV Push every 6 hours PRN  heparin   Injectable 94011 Unit(s) IV Push every 6 hours PRN  heparin  Infusion.  Unit(s)/Hr IV Continuous <Continuous>  insulin glargine Injectable (LANTUS) 15 Unit(s) SubCutaneous every morning  insulin glargine Injectable (LANTUS) 15 Unit(s) SubCutaneous at bedtime  insulin lispro (ADMELOG) corrective regimen sliding scale   SubCutaneous at bedtime  insulin lispro (ADMELOG) corrective regimen sliding scale   SubCutaneous three times a day before meals  loratadine 10 milliGRAM(s) Oral daily  nystatin Powder 1 Application(s) Topical every 12 hours  oxyCODONE  ER Tablet 60 milliGRAM(s) Oral every 12 hours  polyethylene glycol 3350 17 Gram(s) Oral daily  predniSONE   Tablet   Oral   predniSONE   Tablet 30 milliGRAM(s) Oral daily    FH:Family history of pancreatic cancer    Family history of bladder cancer    Family history of lung cancer (Grandparent)      PSX: Cataract extracted with lens implant 1998    C Section 1994    Cholecystectomy/appendectomy @ age 25    D&C x2 1980's    D&C 2008    Cervical Spinal Stenosis surgery x2 (01/2002, 7/2002)    Tonsillectomy as a child    Endometrial biopsy 12/02/09    H/O laser iridotomy    H/O colonoscopy    S/P total abdominal hysterectomy and bilateral salpingo-oophorectomy    S/P appendectomy    S/P cholecystectomy      SH: albuterol    0.083% 2.5 milliGRAM(s) Nebulizer every 4 hours PRN  albuterol/ipratropium for Nebulization 3 milliLiter(s) Nebulizer every 6 hours  aspirin enteric coated 81 milliGRAM(s) Oral daily  atorvastatin 40 milliGRAM(s) Oral at bedtime  budesonide 160 MICROgram(s)/formoterol 4.5 MICROgram(s) Inhaler 2 Puff(s) Inhalation two times a day  chlorhexidine 2% Cloths 1 Application(s) Topical <User Schedule>  cholecalciferol 2000 Unit(s) Oral daily  dextrose 5%. 1000 milliLiter(s) IV Continuous <Continuous>  dextrose 5%. 1000 milliLiter(s) IV Continuous <Continuous>  dextrose 50% Injectable 12.5 Gram(s) IV Push once  dextrose 50% Injectable 25 Gram(s) IV Push once  dextrose 50% Injectable 25 Gram(s) IV Push once  dextrose Oral Gel 15 Gram(s) Oral once PRN  doxycycline monohydrate Capsule 100 milliGRAM(s) Oral every 12 hours  furosemide   Injectable 40 milliGRAM(s) IV Push every 12 hours  glucagon  Injectable 1 milliGRAM(s) IntraMuscular once  heparin   Injectable 24437 Unit(s) IV Push every 6 hours PRN  heparin   Injectable 5000 Unit(s) IV Push every 6 hours PRN  heparin  Infusion.  Unit(s)/Hr IV Continuous <Continuous>  insulin glargine Injectable (LANTUS) 15 Unit(s) SubCutaneous at bedtime  insulin glargine Injectable (LANTUS) 15 Unit(s) SubCutaneous every morning  insulin lispro (ADMELOG) corrective regimen sliding scale   SubCutaneous three times a day before meals  insulin lispro (ADMELOG) corrective regimen sliding scale   SubCutaneous at bedtime  loratadine 10 milliGRAM(s) Oral daily  nystatin Powder 1 Application(s) Topical every 12 hours  oxyCODONE  ER Tablet 60 milliGRAM(s) Oral every 12 hours  polyethylene glycol 3350 17 Gram(s) Oral daily  predniSONE   Tablet   Oral   predniSONE   Tablet 30 milliGRAM(s) Oral daily      Vital Signs Last 24 Hrs  T(C): 36.5 (31 Aug 2023 12:08), Max: 36.8 (30 Aug 2023 19:18)  T(F): 97.7 (31 Aug 2023 12:08), Max: 98.2 (30 Aug 2023 19:18)  HR: 71 (31 Aug 2023 12:08) (71 - 82)  BP: 121/56 (31 Aug 2023 12:08) (111/61 - 137/59)  BP(mean): 78 (31 Aug 2023 12:08) (78 - 78)  RR: 19 (31 Aug 2023 12:08) (18 - 77)  SpO2: 96% (31 Aug 2023 12:08) (94% - 99%)    Parameters below as of 31 Aug 2023 15:01  Patient On (Oxygen Delivery Method): nasal cannula, 2L        LABS                        10.9   9.10  )-----------( 232      ( 31 Aug 2023 10:25 )             36.6               08-31    144  |  101  |  39.1<H>  ----------------------------<  79  4.9   |  32.0<H>  |  2.06<H>    Ca    9.4      31 Aug 2023 10:25    TPro  7.0  /  Alb  3.7  /  TBili  0.6  /  DBili  x   /  AST  26  /  ALT  18  /  AlkPhos  222<H>  08-30      ROS  REVIEW OF SYSTEMS: unremarkable outside HPI      PHYSICAL EXAM  LE Focused:    Vasc:  DP and PT faintly palpable 1/4 with pitting edema +1 to b/l LE, skin tg warm to warm b/l Le prox to dist   Derm: Right hallux laceration noted sub IPJ minimally closed with surgical glue with granular wound base and no drainage noted or PTB. No odor or crepitation. Left lateral heel fibrotic wound noted meausuring approx 3X2cm no PTB, no flucutance or crepitation, or concern for infection  Neuro: Protective sensation grossly diminished   MSK: non functional ambulation       IMAGING: ?xray  < from: US Duplex Venous Lower Ext Complete, Bilateral (08.31.23 @ 09:27) >  FINDINGS:    RIGHT:  Normal compressibility ofthe RIGHT common femoral and femoral vein.  Doppler examination shows normal spontaneous and phasic flow.  Popliteal vein and calf veins not visualized.    LEFT:  Normal compressibility of the LEFT.  Doppler examination shows normal spontaneous and phasic flow.  Popliteal vein and calf veins not visualized.    IMPRESSION:    Markedly limited visualization. Unable to perform compression. Popliteal   vein and calf veins not visualized.    No evidence of deep venous thrombosis in either lower extremity.    < end of copied text >  < from: Xray Foot AP + Lateral, Right (08.30.23 @ 23:18) >  FINDINGS:    There is moderate generalized swelling. Extensive degenerative changes   midfoot. No acute fracture or dislocation. No radiopaque foreign body.    Portable chest 5:12 PM compared with 10/12/2022    FINDINGS: 2 views submitted and are limited by rotation. Right lateral   base and left costophrenic angle is cut from film.    The heart size and mediastinumare enlarged which may be projectional.   Mild congestive changes. No pneumothorax. No gross fracture.    IMPRESSION: Limited chest x-ray with mild congestive changes. Recommend   follow-up examination    Moderate generalized swelling of the right foot. No evidence of fracture,   radiopaque foreign body, subcutaneous emphysema or osteomyelitis. If   warranted clinically obtain additional imaging.    --- End of Report ---    < end of copied text >      A:  Right hallux laceration  Left heel wound     P:   Patient evaluated and Chart reviewed   Discussed diagnosis and treatment with patient  Wound flushed with normal saline  Excisional debridement with 15 blade of Left lateral heel wound with 15 blade and sterile scissors down to and including subcutaneous tissue  Applied [employer] allevyn pad to L heel  applied xeroform DSD to R hallux wound-switch to aquacell tomorrow for laceration of R hallux to heal by secondary wound healing  X-rays evaluated as noted above  Continue with antibiotics As Per ID  recc off loading cair boots to B/L heels   Offloading to bilateral Heels.   Discussed importance of daily foot examinations and proper shoe gear and to importance of lower Fasting Blood Glucose levels.   Podiatry to follow while in house.  Discussed with Attending Dr. Emanuel

## 2023-08-31 NOTE — CONSULT NOTE ADULT - TIME BILLING
greater than 50% of time spent reviewing labs, notes, orders and radiographs, coordinating care  discussed with pt bedside, ER and med team

## 2023-08-31 NOTE — ADVANCED PRACTICE NURSE CONSULT - RECOMMEDATIONS
·	Surgical Debridement of wound    ·	AFTER DEBRIDEMENT  - Cleanse with normal saline, pat dry to moist surface, paint periwound with cavilon (liquid skin barrier), apply Collagenase Santyl, 2mm thick layer (nickel thick layer) to ENTIRE wound bed,  Apply saline moistened gauze on top of wound bed, then cover with ABD pad, secure with Tegaderm (transparent film dressing) or tape - change dressing daily and prn for strike-through drainage or soiling    ·	Turn and Reposition Q2-4H  ·	Offload sacral-coccygeal area with positioner pillow, alternate sides Q2-4H  ·	Incontinence care with repositioning Q2-4H  ·	Green Waffle Seat cushion at all times when patient is out of bed in chair  ·	Apply Heel-Offloading cAIRboots or vertical pillows under each leg at all times while in bed. - provide skin checks and foot placement Q8H    -Continue turning/positioning patient from side-to-side q2h while in bed, q1h when/if OOB chair, or in accordance w/ pt's plan of care. Utilize pillows and/or Spry positioner pillow to assist w/ turning/positioning. When/if OOB chair, utilize pillows or chair cushion to offload pressure.   -Continue to offload heels from bed surface with soft pillow under calfs or by applying offloading boots to BLEs.   -Continue applying Coloplast Alessandro Protect moisture barrier cream to buttock and perineal area daily and prn after each incontinent episode.    -Continue utilizing one underpad underneath patient to contain incontinence episodes; change pad when saturated/soiled.   -Consider utilizing female incontinence device/Primafit (if patient candidate) to contain urinary incontinence.  -Continue nutrition consult for optimal wound healing & nutritional status.   -Assess skin/wound qshift, report changes to primary provider.     Plan of Care: Primary RN made aware of above recs. Spoke w/ provider, Dr. Eng in regards to above. No further needs/recs from [facility] service at this time. Staff RN to perform routine skin/wound assessment and manage wound care. Questions or concerns or if wound worsens and reconsult needed, please contact [facility]

## 2023-08-31 NOTE — CONSULT NOTE ADULT - PROBLEM SELECTOR PROBLEM 1
Acute on chronic diastolic congestive heart failure
Acute on chronic respiratory failure with hypoxemia

## 2023-08-31 NOTE — PROGRESS NOTE ADULT - ASSESSMENT
69 y/o morbidly obese female with h/o asthma on home o2, HFp EF, spinal stenosis, dm, ckd, , dvt lt. leg in 2004, treated and resolved came in for increasing sob for past 1 week. pt. stated that she has baseline chronic sob but last week was more than her baseline. As per pt. she gets oob and uses walker. no cp. no abd. pain . no n/v/d. no fever. In the ER  first trop 0.09 , d dimer 407, due to pt's weight she exceeds the wt. limit for ct. Cardiology following, continue diuresis, Heparin drip though low suspicion for clot however continues for potential ACS, unable to stress test, unable to perform CTA / VQ scan, LHC / RHC due to weight, Pulm following, severe Pulm HTN, vague h/o Asthma, patient is at baseline oxygenation, however could benefit still from further diuresis.     #Acute on Chronic HFpEF   ·  ECHO performed with 55% EF, moderate reduced RV function, severe RVSP elevation   - [facility] [place] [facility] following, continues on diuresis with Lasix 40 mg IV BID, unable to perform LHC / Stress test due to weight, Stress ECHO could be done but is of limited utility in this case     #Elevated troponin.   first trop 0.09, no active cp, trended down to .06, asa, statin on board, hold b.mando has h/o asthma, follow cardiology team recommendations.  nonspecific T wave abnorms, continues on Heparin drip, however unable to perform Stress / LHC as above      #Pulmonary HTN   per ECHO RVSP severely elevated > 70, likely unable to obtain RHC given weight, possibly Type II/III   Will trial BiPAP with nasal mask as patient can't tolerate full face mask   will check VBG per Pulm    #Obesity hypoventilation syndrome   Patient does not use CPAP/Bipap at home, claustrophobic and does not like full face mask  Trial of BiPAP with nasal mask this evening     #Acute on Chronic respiratory failure with hypoxia  Multifactorial HFpEF exacerbation / Severe pulm HTN, morbid obesity with restrictive PFTs from office   Pulm following, short course of nebs ordered, wean to PRN, symbicort with spacer ordered given h/o asthma, with short course of medrol   - Patient at near baseline O2 given she uses 4L NC at home     #JACQUES on CKD stage 3    ·  Plan: Cr 2.06 somewhat close to baseline however requires further diuresis, avoid nephrotoxic meds, hold lisinopril for now  close f/u on Cr while on iv lasix  Will consult nephrology if consistent elevation while diuresing.    #DM (diabetes mellitus).    pt's Blood gluocse 75 on arrival, will keep on less dose of lantus , admelog scale and adjust per BG response.  SSI    #Chronic Edema   Lymphedema noted in B/L LE's, chronic changes of Abdomen with weeping while lifting pannus   Wound care following   Skin maceration in multiple fold areas, Dressing over L. groin wound under pannus     #Wound of foot.   rt. foot wound above 5th toe , recently got skin laceration, will keep on doxycycline  X ray foot shows no signs of foreign body / OM, nonspecific edema of foot   Podiatry consulted, however primarily chronic changes of both feet     #Morbid Obesity   Patient > 580 lbs, greatly impacting potential health care, unable to use CT imaging, NM stress, V/Q scan     DVT prophylaxis: Heparin   Diet: DASH   Dispo: Pending clinical improvement

## 2023-09-01 DIAGNOSIS — I27.20 PULMONARY HYPERTENSION, UNSPECIFIED: ICD-10-CM

## 2023-09-01 DIAGNOSIS — I50.31 ACUTE DIASTOLIC (CONGESTIVE) HEART FAILURE: ICD-10-CM

## 2023-09-01 LAB
ANION GAP SERPL CALC-SCNC: 10 MMOL/L — SIGNIFICANT CHANGE UP (ref 5–17)
APTT BLD: 61.9 SEC — HIGH (ref 24.5–35.6)
APTT BLD: 64.5 SEC — HIGH (ref 24.5–35.6)
BASE EXCESS BLDV CALC-SCNC: 10.5 MMOL/L — HIGH (ref -2–3)
BASOPHILS # BLD AUTO: 0.08 K/UL — SIGNIFICANT CHANGE UP (ref 0–0.2)
BASOPHILS NFR BLD AUTO: 0.9 % — SIGNIFICANT CHANGE UP (ref 0–2)
BUN SERPL-MCNC: 39.1 MG/DL — HIGH (ref 8–20)
CA-I SERPL-SCNC: 1.05 MMOL/L — LOW (ref 1.15–1.33)
CALCIUM SERPL-MCNC: 9 MG/DL — SIGNIFICANT CHANGE UP (ref 8.4–10.5)
CHLORIDE BLDV-SCNC: 100 MMOL/L — SIGNIFICANT CHANGE UP (ref 96–108)
CHLORIDE SERPL-SCNC: 98 MMOL/L — SIGNIFICANT CHANGE UP (ref 96–108)
CO2 SERPL-SCNC: 32 MMOL/L — HIGH (ref 22–29)
CREAT SERPL-MCNC: 2.12 MG/DL — HIGH (ref 0.5–1.3)
EGFR: 25 ML/MIN/1.73M2 — LOW
EOSINOPHIL # BLD AUTO: 0.38 K/UL — SIGNIFICANT CHANGE UP (ref 0–0.5)
EOSINOPHIL NFR BLD AUTO: 4.3 % — SIGNIFICANT CHANGE UP (ref 0–6)
GAS PNL BLDV: 135 MMOL/L — LOW (ref 136–145)
GAS PNL BLDV: SIGNIFICANT CHANGE UP
GLUCOSE BLDC GLUCOMTR-MCNC: 150 MG/DL — HIGH (ref 70–99)
GLUCOSE BLDC GLUCOMTR-MCNC: 239 MG/DL — HIGH (ref 70–99)
GLUCOSE BLDC GLUCOMTR-MCNC: 252 MG/DL — HIGH (ref 70–99)
GLUCOSE BLDC GLUCOMTR-MCNC: 258 MG/DL — HIGH (ref 70–99)
GLUCOSE BLDV-MCNC: 220 MG/DL — HIGH (ref 70–99)
GLUCOSE SERPL-MCNC: 134 MG/DL — HIGH (ref 70–99)
HCO3 BLDV-SCNC: 34 MMOL/L — HIGH (ref 22–29)
HCT VFR BLD CALC: 35.9 % — SIGNIFICANT CHANGE UP (ref 34.5–45)
HCT VFR BLD CALC: 35.9 % — SIGNIFICANT CHANGE UP (ref 34.5–45)
HCT VFR BLDA CALC: 35 % — SIGNIFICANT CHANGE UP
HGB BLD CALC-MCNC: 11.5 G/DL — LOW (ref 11.7–16.1)
HGB BLD-MCNC: 10.5 G/DL — LOW (ref 11.5–15.5)
HGB BLD-MCNC: 10.5 G/DL — LOW (ref 11.5–15.5)
IMM GRANULOCYTES NFR BLD AUTO: 1.3 % — HIGH (ref 0–0.9)
LACTATE BLDV-MCNC: 1.3 MMOL/L — SIGNIFICANT CHANGE UP (ref 0.5–2)
LYMPHOCYTES # BLD AUTO: 1.37 K/UL — SIGNIFICANT CHANGE UP (ref 1–3.3)
LYMPHOCYTES # BLD AUTO: 15.6 % — SIGNIFICANT CHANGE UP (ref 13–44)
MCHC RBC-ENTMCNC: 27.3 PG — SIGNIFICANT CHANGE UP (ref 27–34)
MCHC RBC-ENTMCNC: 27.3 PG — SIGNIFICANT CHANGE UP (ref 27–34)
MCHC RBC-ENTMCNC: 29.2 GM/DL — LOW (ref 32–36)
MCHC RBC-ENTMCNC: 29.2 GM/DL — LOW (ref 32–36)
MCV RBC AUTO: 93.5 FL — SIGNIFICANT CHANGE UP (ref 80–100)
MCV RBC AUTO: 93.5 FL — SIGNIFICANT CHANGE UP (ref 80–100)
MONOCYTES # BLD AUTO: 0.89 K/UL — SIGNIFICANT CHANGE UP (ref 0–0.9)
MONOCYTES NFR BLD AUTO: 10.2 % — SIGNIFICANT CHANGE UP (ref 2–14)
NEUTROPHILS # BLD AUTO: 5.93 K/UL — SIGNIFICANT CHANGE UP (ref 1.8–7.4)
NEUTROPHILS NFR BLD AUTO: 67.7 % — SIGNIFICANT CHANGE UP (ref 43–77)
PCO2 BLDV: 42 MMHG — SIGNIFICANT CHANGE UP (ref 39–42)
PH BLDV: 7.51 — HIGH (ref 7.32–7.43)
PLATELET # BLD AUTO: 210 K/UL — SIGNIFICANT CHANGE UP (ref 150–400)
PLATELET # BLD AUTO: 210 K/UL — SIGNIFICANT CHANGE UP (ref 150–400)
PO2 BLDV: 178 MMHG — HIGH (ref 25–45)
POTASSIUM BLDV-SCNC: 5.4 MMOL/L — HIGH (ref 3.5–5.1)
POTASSIUM SERPL-MCNC: 5.2 MMOL/L — SIGNIFICANT CHANGE UP (ref 3.5–5.3)
POTASSIUM SERPL-SCNC: 5.2 MMOL/L — SIGNIFICANT CHANGE UP (ref 3.5–5.3)
RBC # BLD: 3.84 M/UL — SIGNIFICANT CHANGE UP (ref 3.8–5.2)
RBC # BLD: 3.84 M/UL — SIGNIFICANT CHANGE UP (ref 3.8–5.2)
RBC # FLD: 16 % — HIGH (ref 10.3–14.5)
RBC # FLD: 16 % — HIGH (ref 10.3–14.5)
SAO2 % BLDV: 98.2 % — SIGNIFICANT CHANGE UP
SODIUM SERPL-SCNC: 140 MMOL/L — SIGNIFICANT CHANGE UP (ref 135–145)
WBC # BLD: 8.76 K/UL — SIGNIFICANT CHANGE UP (ref 3.8–10.5)
WBC # BLD: 8.76 K/UL — SIGNIFICANT CHANGE UP (ref 3.8–10.5)
WBC # FLD AUTO: 8.76 K/UL — SIGNIFICANT CHANGE UP (ref 3.8–10.5)
WBC # FLD AUTO: 8.76 K/UL — SIGNIFICANT CHANGE UP (ref 3.8–10.5)

## 2023-09-01 PROCEDURE — 99233 SBSQ HOSP IP/OBS HIGH 50: CPT

## 2023-09-01 PROCEDURE — 99232 SBSQ HOSP IP/OBS MODERATE 35: CPT

## 2023-09-01 RX ORDER — BUMETANIDE 0.25 MG/ML
2 INJECTION INTRAMUSCULAR; INTRAVENOUS EVERY 12 HOURS
Refills: 0 | Status: DISCONTINUED | OUTPATIENT
Start: 2023-09-01 | End: 2023-09-04

## 2023-09-01 RX ORDER — CADEXOMER IODINE 0.9 %
1 PADS, MEDICATED (EA) TOPICAL DAILY
Refills: 0 | Status: DISCONTINUED | OUTPATIENT
Start: 2023-09-01 | End: 2023-09-13

## 2023-09-01 RX ORDER — ALPRAZOLAM 0.25 MG
0.25 TABLET ORAL ONCE
Refills: 0 | Status: DISCONTINUED | OUTPATIENT
Start: 2023-09-01 | End: 2023-09-01

## 2023-09-01 RX ADMIN — Medication 4: at 11:29

## 2023-09-01 RX ADMIN — Medication 1 APPLICATION(S): at 11:33

## 2023-09-01 RX ADMIN — ATORVASTATIN CALCIUM 40 MILLIGRAM(S): 80 TABLET, FILM COATED ORAL at 21:43

## 2023-09-01 RX ADMIN — BUDESONIDE AND FORMOTEROL FUMARATE DIHYDRATE 2 PUFF(S): 160; 4.5 AEROSOL RESPIRATORY (INHALATION) at 21:48

## 2023-09-01 RX ADMIN — HEPARIN SODIUM 1300 UNIT(S)/HR: 5000 INJECTION INTRAVENOUS; SUBCUTANEOUS at 11:24

## 2023-09-01 RX ADMIN — NYSTATIN CREAM 1 APPLICATION(S): 100000 CREAM TOPICAL at 05:43

## 2023-09-01 RX ADMIN — POLYETHYLENE GLYCOL 3350 17 GRAM(S): 17 POWDER, FOR SOLUTION ORAL at 11:34

## 2023-09-01 RX ADMIN — OXYCODONE HYDROCHLORIDE 60 MILLIGRAM(S): 5 TABLET ORAL at 06:30

## 2023-09-01 RX ADMIN — INSULIN GLARGINE 15 UNIT(S): 100 INJECTION, SOLUTION SUBCUTANEOUS at 21:42

## 2023-09-01 RX ADMIN — NYSTATIN CREAM 1 APPLICATION(S): 100000 CREAM TOPICAL at 17:47

## 2023-09-01 RX ADMIN — Medication 6: at 16:09

## 2023-09-01 RX ADMIN — Medication 40 MILLIGRAM(S): at 05:43

## 2023-09-01 RX ADMIN — LORATADINE 10 MILLIGRAM(S): 10 TABLET ORAL at 11:34

## 2023-09-01 RX ADMIN — Medication 3 MILLILITER(S): at 02:25

## 2023-09-01 RX ADMIN — Medication 0.25 MILLIGRAM(S): at 12:43

## 2023-09-01 RX ADMIN — INSULIN GLARGINE 15 UNIT(S): 100 INJECTION, SOLUTION SUBCUTANEOUS at 07:47

## 2023-09-01 RX ADMIN — HEPARIN SODIUM 1300 UNIT(S)/HR: 5000 INJECTION INTRAVENOUS; SUBCUTANEOUS at 04:01

## 2023-09-01 RX ADMIN — Medication 100 MILLIGRAM(S): at 18:35

## 2023-09-01 RX ADMIN — Medication 30 MILLIGRAM(S): at 05:43

## 2023-09-01 RX ADMIN — Medication 3 MILLILITER(S): at 15:31

## 2023-09-01 RX ADMIN — BUMETANIDE 2 MILLIGRAM(S): 0.25 INJECTION INTRAMUSCULAR; INTRAVENOUS at 17:46

## 2023-09-01 RX ADMIN — OXYCODONE HYDROCHLORIDE 60 MILLIGRAM(S): 5 TABLET ORAL at 17:46

## 2023-09-01 RX ADMIN — Medication 2: at 21:42

## 2023-09-01 RX ADMIN — HEPARIN SODIUM 1300 UNIT(S)/HR: 5000 INJECTION INTRAVENOUS; SUBCUTANEOUS at 07:36

## 2023-09-01 RX ADMIN — HEPARIN SODIUM 1300 UNIT(S)/HR: 5000 INJECTION INTRAVENOUS; SUBCUTANEOUS at 19:03

## 2023-09-01 RX ADMIN — Medication 100 MILLIGRAM(S): at 05:43

## 2023-09-01 RX ADMIN — Medication 81 MILLIGRAM(S): at 11:33

## 2023-09-01 RX ADMIN — Medication 3 MILLILITER(S): at 08:27

## 2023-09-01 RX ADMIN — Medication 3 MILLILITER(S): at 22:13

## 2023-09-01 RX ADMIN — HEPARIN SODIUM 1300 UNIT(S)/HR: 5000 INJECTION INTRAVENOUS; SUBCUTANEOUS at 17:51

## 2023-09-01 RX ADMIN — CHLORHEXIDINE GLUCONATE 1 APPLICATION(S): 213 SOLUTION TOPICAL at 05:42

## 2023-09-01 RX ADMIN — Medication 2000 UNIT(S): at 11:33

## 2023-09-01 RX ADMIN — OXYCODONE HYDROCHLORIDE 60 MILLIGRAM(S): 5 TABLET ORAL at 18:31

## 2023-09-01 RX ADMIN — OXYCODONE HYDROCHLORIDE 60 MILLIGRAM(S): 5 TABLET ORAL at 05:43

## 2023-09-01 NOTE — CHART NOTE - NSCHARTNOTEFT_GEN_A_CORE
RN called to report pt with c/o pain all over especially bilateral knees  Pt seen at bedside stated that her pain is all over and she is having increased anxiety with illness   Pt request son to be in contact with cardiologist to discuss plan of care will contact cards with sons phone number   Plan:   ii percocet ordered 1 time   0.25 xanax ordered one time for anxiety

## 2023-09-01 NOTE — PROGRESS NOTE ADULT - ASSESSMENT
acute on chronic hypoxemic resp failure/OHS-mild by serum CO2/ENRIQUE  HFpEF, severe Pulmonary Htn, never smoker hx "asthma"- PFts restrictive 2019  Morbid obesity- limited mobilty, hx Falls at home  CXR limited, duplex without DVT, D dimer minimal 407 ( normal 350 corrected for age)  Cr elevated, Current 560 precludes V/Q table- need accurate weight  02 requirement 4-5L appears near baseline, no bronchospasm on exam  Trial of Bipap unlikely to succeed - truncal elevation for sleep and 02 seem adequate  short course of nebs, wean to prn, Symbicort with spacer bid  empiric abx/ short course of medrol as clinically indicated  Diuresis  ? RHC , If wt acceptable  Given above fall risks, unclear risk/benefit of empiric AC from pulmonary perspective  Would D/C heparin  and use high dose prophylaxis  Intermittent calf compression when possible  prognosis extremely guarded  needs continuous 02, monitored bed

## 2023-09-01 NOTE — PATIENT PROFILE ADULT - PATIENT'S PREFERRED PRONOUN
Nsaids Pregnancy And Lactation Text: These medications are considered safe up to 30 weeks gestation. It is excreted in breast milk. Her/She

## 2023-09-01 NOTE — PROGRESS NOTE ADULT - ASSESSMENT
67 y/o morbidly obese female with h/o asthma on home o2, HFp EF, spinal stenosis, dm, ckd, , dvt lt. leg in 2004, treated and resolved came in for increasing sob for past 1 week. pt. stated that she has baseline chronic sob but last week was more than her baseline and weakness. As per pt. she gets oob and uses walker. In the ER  first trop 0.09 , d dimer 407, due to pt's weight she exceeds the wt. limit for ct. Cardiology following, continue diuresis, Heparin drip though low suspicion for clot however continues for potential ACS, unable to stress test, unable to perform CTA / VQ scan, LHC / RHC due to weight, Pulm following, severe Pulm HTN, vague h/o Asthma, patient is at baseline oxygenation, however could benefit still from further diuresis.     #Acute on Chronic HFpEF   ·  ECHO performed with 55% EF, moderate reduced RV function, severe RVSP elevation   - cardiology [facility] team following, continues on diuresis with Lasix 40 mg IV BID, unable to perform LHC / Stress test due to weight, Stress ECHO could be done but is of limited utility in this case     #Elevated troponin.   first trop 0.09, no active cp, trended down to .06, asa, statin on board, hold b.ailink has h/o asthma, follow cardiology team recommendations.  nonspecific T wave abnorms, continues on Heparin drip, however unable to perform Stress / LHC as above      #Pulmonary HTN   per ECHO RVSP severely elevated > 70, likely unable to obtain RHC given weight, possibly Type II/III   Will trial BiPAP with nasal mask as patient can't tolerate full face mask   will check VBG per Pulm    #Obesity hypoventilation syndrome   Patient does not use CPAP/Bipap at home, claustrophobic and does not like full face mask  Trial of BiPAP with nasal mask this evening     #Acute on Chronic respiratory failure with hypoxia  Multifactorial- restrictive lung disease 2/2 morbid obesity, HFpEF exacerbation / Severe pulm HTN  Pulm following, short course of nebs, wean to PRN, symbicort with spacer ordered given h/o asthma, with short course of medrol   - Patient at near baseline O2 given she uses 4L NC at home     #JACQUES on CKD stage 3    ·  Plan: Cr 2.06 somewhat close to baseline however requires further diuresis, avoid nephrotoxic meds, hold lisinopril for now  close f/u on Cr while on iv lasix  Will consult nephrology if consistent elevation while diuresing.    #DM (diabetes mellitus).    pt's Blood gluocse 75 on arrival, will keep on less dose of lantus , admelog scale and adjust per BG response.  SSI    #Chronic Edema   Lymphedema noted in B/L LE's, chronic changes of Abdomen with weeping while lifting pannus   Wound care following   Skin maceration in multiple fold areas, Dressing over L. groin wound under pannus     #Wound of foot.   rt. foot wound above 5th toe , recently got skin laceration, will keep on doxycycline  X ray foot shows no signs of foreign body / OM, nonspecific edema of foot   Podiatry consulted, however primarily chronic changes of both feet     #Morbid Obesity   Patient > 580 lbs, greatly impacting potential health care, unable to use CT imaging, NM stress, V/Q scan     Fall at home - Severely deconditioned - PT eval .     DVT prophylaxis: Heparin   Diet: DASH   Dispo: PendingPT eval and cardio recs.

## 2023-09-01 NOTE — PROGRESS NOTE ADULT - ASSESSMENT
IMAGING: ?xray  < from: US Duplex Venous Lower Ext Complete, Bilateral (08.31.23 @ 09:27) >  FINDINGS:    RIGHT:  Normal compressibility ofthe RIGHT common femoral and femoral vein.  Doppler examination shows normal spontaneous and phasic flow.  Popliteal vein and calf veins not visualized.    LEFT:  Normal compressibility of the LEFT.  Doppler examination shows normal spontaneous and phasic flow.  Popliteal vein and calf veins not visualized.    IMPRESSION:    Markedly limited visualization. Unable to perform compression. Popliteal   vein and calf veins not visualized.    No evidence of deep venous thrombosis in either lower extremity.    < end of copied text >  < from: Xray Foot AP + Lateral, Right (08.30.23 @ 23:18) >  FINDINGS:    There is moderate generalized swelling. Extensive degenerative changes   midfoot. No acute fracture or dislocation. No radiopaque foreign body.    Portable chest 5:12 PM compared with 10/12/2022    FINDINGS: 2 views submitted and are limited by rotation. Right lateral   base and left costophrenic angle is cut from film.    The heart size and mediastinumare enlarged which may be projectional.   Mild congestive changes. No pneumothorax. No gross fracture.    IMPRESSION: Limited chest x-ray with mild congestive changes. Recommend   follow-up examination    Moderate generalized swelling of the right foot. No evidence of fracture,   radiopaque foreign body, subcutaneous emphysema or osteomyelitis. If   warranted clinically obtain additional imaging.    --- End of Report ---    < end of copied text >      A:  Right hallux laceration  Left heel wound     P:   Patient evaluated and Chart reviewed   Discussed diagnosis and treatment with patient  Xrays reviewed - charcot arthopathy noted R foot  Wound flushed with normal saline  Applied iodosorb allevyn pad to L heel  applied aquacell dsd R hallux laceration   Cont abx per primary team  recc off loading cair boots to B/L heels   PT stable from podiatry standpoint  Upon discharge and in house recommend iodosorb with alleyvn pad to the left heel and iodosorb, 4x4 gauze, yasir secured with tape to right hallux laceration. Please change Tuesday, Thursday, Saturday. PT to keep dressing clean, able to wb as tolerated with no restrictions.   PT to follow up with their outside podiatrist, however can follow up with Dr. Emanuel at [address] [place] [address]. Please call [phone] to make an appointment   Discussed and seen with Attending Dr. Emanuel   IMAGING: ?xray  < from: US Duplex Venous Lower Ext Complete, Bilateral (08.31.23 @ 09:27) >  FINDINGS:    RIGHT:  Normal compressibility ofthe RIGHT common femoral and femoral vein.  Doppler examination shows normal spontaneous and phasic flow.  Popliteal vein and calf veins not visualized.    LEFT:  Normal compressibility of the LEFT.  Doppler examination shows normal spontaneous and phasic flow.  Popliteal vein and calf veins not visualized.    IMPRESSION:    Markedly limited visualization. Unable to perform compression. Popliteal   vein and calf veins not visualized.    No evidence of deep venous thrombosis in either lower extremity.    < end of copied text >  < from: Xray Foot AP + Lateral, Right (08.30.23 @ 23:18) >  FINDINGS:    There is moderate generalized swelling. Extensive degenerative changes   midfoot. No acute fracture or dislocation. No radiopaque foreign body.    Portable chest 5:12 PM compared with 10/12/2022    FINDINGS: 2 views submitted and are limited by rotation. Right lateral   base and left costophrenic angle is cut from film.    The heart size and mediastinumare enlarged which may be projectional.   Mild congestive changes. No pneumothorax. No gross fracture.    IMPRESSION: Limited chest x-ray with mild congestive changes. Recommend   follow-up examination    Moderate generalized swelling of the right foot. No evidence of fracture,   radiopaque foreign body, subcutaneous emphysema or osteomyelitis. If   warranted clinically obtain additional imaging.    --- End of Report ---    < end of copied text >      A:  Right hallux laceration - secondary intention healing   Left heel wound     P:   Patient evaluated and Chart reviewed   Discussed diagnosis and treatment with patient  Xrays reviewed - charcot arthopathy noted R foot  Wound flushed with normal saline  Applied iodosorb allevyn pad to L heel  applied aquacell dsd R hallux laceration   Cont abx per primary team  recc off loading cair boots to B/L heels   PT stable from podiatry standpoint  Upon discharge and in house recommend iodosorb with alleyvn pad to the left heel and iodosorb, 4x4 gauze, yasir secured with tape to right hallux laceration. Please change Tuesday, Thursday, Saturday. PT to keep dressing clean, able to wb as tolerated with no restrictions although pt nonambulatory.   PT to follow up with their outside podiatrist, however can follow up with Dr. Emanuel at [address] [place] [address]. Please call [phone] to make an appointment   Discussed and seen with Attending Dr. Emanuel

## 2023-09-01 NOTE — PROGRESS NOTE ADULT - ASSESSMENT
A/P: This is 69 y/o female with hx of HFpEF, on home O2 4L, pulmonary HTN, morbid obesity, asthma, CKD3, IDDM2, uterine cancer s/p MEGAN, DVT LLE (2004), chronic leg edema 2/2 venous stasis, spinal stenosis with chronic back pain who presents with SOB. Pt states that she has chronic difficulty breathing but it got even worse than normal over the past week. Pt ambulates with a walker. She fell and sustained right foot laceration a few days ago. Today she wasn't able to get out of bed due to dyspnea and pain and family called EMS. In the ED pt found with elevated D-dimer (407) and troponin  (0.09) and was started on Heparin drip. Cr 1.89, proBNP 1623. Pt follows with a cardiologist from [place]. Pt states that she's been having daily, intermittent chest pain for years without any recent change. She had an abnormal stress test in the past but no cardiac catheterization was performed. Echocardiogram in 10/2022 showed EF 50-55% and no significant valvular abnormalities.

## 2023-09-01 NOTE — PATIENT PROFILE ADULT - FALL HARM RISK - HARM RISK INTERVENTIONS
Assistance with ambulation/Assistance OOB with selected safe patient handling equipment/Communicate Risk of Fall with Harm to all staff/Discuss with provider need for PT consult/Reinforce activity limits and safety measures with patient and family/Tailored Fall Risk Interventions/Visual Cue: Yellow wristband and red socks/Bed in lowest position, wheels locked, appropriate side rails in place/Call bell, personal items and telephone in reach/Instruct patient to call for assistance before getting out of bed or chair/Non-slip footwear when patient is out of bed/[place] to call system/Physically safe environment - no spills, clutter or unnecessary equipment/Purposeful Proactive Rounding/Room/bathroom lighting operational, light cord in reach

## 2023-09-01 NOTE — PROGRESS NOTE ADULT - NS ATTEND AMEND GEN_ALL_CORE FT
Patient was seen and examined at bedside and appears to be profoundly volume overloaded; started on Lasix 40gmIVP q 12hrs   D-dimer 407 and age adjusted 345 µg/L Age-adjusted D-dimer cutoff, DDU VTE possible Reported D-dimer is greater than cutoff; consider confirmatory testing with CTA or V/Q scan  US lower extremity edema: Markedly limited visualization. Unable to perform compression. Popliteal  vein and calf veins not visualized. No evidence of deep venous thrombosis in either lower extremity.  Trops 0.09->0.08->0.06  TTE: Left ventricular ejection fraction, by visual estimation, is 55 to 60%.   4. Grossly Normal global left ventricular systolic function.   5. Spectral Doppler shows pseudonormal pattern of left ventricular   myocardial filling (Grade II diastolic dysfunction).   6. Moderately enlarged right ventricle.   7. Moderately reduced RV systolic function on limited views. Patient was seen and examined at bedside and appears to be profoundly volume overloaded; started on Lasix 40gmIVP q 12hrs   D-dimer 407 and age adjusted 345 µg/L Age-adjusted D-dimer cutoff, DDU VTE possible Reported D-dimer is greater than cutoff; consider confirmatory testing with CTA or V/Q scan  US lower extremity edema: Markedly limited visualization. Unable to perform compression. Popliteal  vein and calf veins not visualized. No evidence of deep venous thrombosis in either lower extremity.  Trops 0.09->0.08->0.06  TTE: Left ventricular ejection fraction, by visual estimation, is 55 to 60%. Moderately enlarged right ventricle. Moderately reduced RV systolic function on limited views.  Estimated pulmonary artery systolic pressure is 70.9 mmHg assuming a right atrial pressure of 5 mmHg, which is consistent with severe pulmonary hypertension.    67 y/o female with hx of HFpEF, on home O2 4L, pulmonary HTN, morbid obesity, asthma, CKD3, IDDM2, uterine cancer s/p MEGAN, DVT LLE (), chronic leg edema 2/2 venous stasis, spinal stenosis with chronic back pain who presents with SOB. Pt states that she has chronic difficulty breathing but it got even worse than normal over the past week. Pt ambulates with a walker. She fell and sustained right foot laceration a few days ago. Today she wasn't able to get out of bed due to dyspnea and pain and family called EMS; with acute on chronic decompensated HFpEF with possible PE   - patient appears to be volume overloaded and on Lasix 40mg IVP and will switch to Bumex 2mg IVP q 12hrs   - monitor I/Os, keep K ~ 4 and Mag ~ 2; if outputs are not adequate may consider IV Bumex ggt   - reviewed Pulmonary note and somewhat hard pressed to stop hep GGT, with elevated D-Dimer and even age adjust 345µg/L Age-adjusted D-dimer cutoff, DDU VTE possible Reported D-dimer is greater than cutoff, morbid obesity, hx of prior DVT, hx of uterine cancer and evidence of severe PH on TTE compared to TTE done in 2020, will leave this to the discretion of hospitalist and Pulmonary   - Precluded in performing any ischemic eval i.e. NM Myocardial or LHC/ RHC due to weight, as the limit on the table is 500 lbs and would perform  stress echo but the TTE due ot body habitus will have poor windows and not ideal to perform this was all explained to the patient and appears to understand

## 2023-09-01 NOTE — PATIENT PROFILE ADULT - FUNCTIONAL ASSESSMENT - BASIC MOBILITY 6.
2-calculated by average/Not able to assess (calculate score using [facility] averaging method)

## 2023-09-02 LAB
APTT BLD: 51.3 SEC — HIGH (ref 24.5–35.6)
APTT BLD: 84.4 SEC — HIGH (ref 24.5–35.6)
APTT BLD: 86.6 SEC — HIGH (ref 24.5–35.6)
GLUCOSE BLDC GLUCOMTR-MCNC: 242 MG/DL — HIGH (ref 70–99)
GLUCOSE BLDC GLUCOMTR-MCNC: 344 MG/DL — HIGH (ref 70–99)
GLUCOSE BLDC GLUCOMTR-MCNC: 353 MG/DL — HIGH (ref 70–99)
GLUCOSE BLDC GLUCOMTR-MCNC: 372 MG/DL — HIGH (ref 70–99)
HCT VFR BLD CALC: 33.8 % — LOW (ref 34.5–45)
HGB BLD-MCNC: 10.3 G/DL — LOW (ref 11.5–15.5)
MCHC RBC-ENTMCNC: 27.8 PG — SIGNIFICANT CHANGE UP (ref 27–34)
MCHC RBC-ENTMCNC: 30.5 GM/DL — LOW (ref 32–36)
MCV RBC AUTO: 91.4 FL — SIGNIFICANT CHANGE UP (ref 80–100)
PLATELET # BLD AUTO: 199 K/UL — SIGNIFICANT CHANGE UP (ref 150–400)
RBC # BLD: 3.7 M/UL — LOW (ref 3.8–5.2)
RBC # FLD: 15.9 % — HIGH (ref 10.3–14.5)
WBC # BLD: 10.82 K/UL — HIGH (ref 3.8–10.5)
WBC # FLD AUTO: 10.82 K/UL — HIGH (ref 3.8–10.5)

## 2023-09-02 PROCEDURE — 99233 SBSQ HOSP IP/OBS HIGH 50: CPT

## 2023-09-02 PROCEDURE — 99232 SBSQ HOSP IP/OBS MODERATE 35: CPT

## 2023-09-02 RX ORDER — HYDROMORPHONE HYDROCHLORIDE 2 MG/ML
0.5 INJECTION INTRAMUSCULAR; INTRAVENOUS; SUBCUTANEOUS
Refills: 0 | Status: DISCONTINUED | OUTPATIENT
Start: 2023-09-02 | End: 2023-09-03

## 2023-09-02 RX ORDER — ALPRAZOLAM 0.25 MG
0.25 TABLET ORAL EVERY 12 HOURS
Refills: 0 | Status: DISCONTINUED | OUTPATIENT
Start: 2023-09-02 | End: 2023-09-09

## 2023-09-02 RX ORDER — LACTULOSE 10 G/15ML
20 SOLUTION ORAL EVERY 8 HOURS
Refills: 0 | Status: DISCONTINUED | OUTPATIENT
Start: 2023-09-02 | End: 2023-09-13

## 2023-09-02 RX ADMIN — NYSTATIN CREAM 1 APPLICATION(S): 100000 CREAM TOPICAL at 05:50

## 2023-09-02 RX ADMIN — Medication 1 DROP(S): at 18:12

## 2023-09-02 RX ADMIN — Medication 30 MILLIGRAM(S): at 05:48

## 2023-09-02 RX ADMIN — INSULIN GLARGINE 15 UNIT(S): 100 INJECTION, SOLUTION SUBCUTANEOUS at 21:55

## 2023-09-02 RX ADMIN — HEPARIN SODIUM 5000 UNIT(S): 5000 INJECTION INTRAVENOUS; SUBCUTANEOUS at 07:06

## 2023-09-02 RX ADMIN — OXYCODONE HYDROCHLORIDE 60 MILLIGRAM(S): 5 TABLET ORAL at 06:30

## 2023-09-02 RX ADMIN — Medication 4: at 08:15

## 2023-09-02 RX ADMIN — HYDROMORPHONE HYDROCHLORIDE 0.5 MILLIGRAM(S): 2 INJECTION INTRAMUSCULAR; INTRAVENOUS; SUBCUTANEOUS at 11:10

## 2023-09-02 RX ADMIN — OXYCODONE HYDROCHLORIDE 60 MILLIGRAM(S): 5 TABLET ORAL at 17:06

## 2023-09-02 RX ADMIN — Medication 10: at 11:37

## 2023-09-02 RX ADMIN — POLYETHYLENE GLYCOL 3350 17 GRAM(S): 17 POWDER, FOR SOLUTION ORAL at 11:37

## 2023-09-02 RX ADMIN — Medication 100 MILLIGRAM(S): at 05:49

## 2023-09-02 RX ADMIN — CHLORHEXIDINE GLUCONATE 1 APPLICATION(S): 213 SOLUTION TOPICAL at 05:49

## 2023-09-02 RX ADMIN — BUDESONIDE AND FORMOTEROL FUMARATE DIHYDRATE 2 PUFF(S): 160; 4.5 AEROSOL RESPIRATORY (INHALATION) at 08:10

## 2023-09-02 RX ADMIN — HYDROMORPHONE HYDROCHLORIDE 0.5 MILLIGRAM(S): 2 INJECTION INTRAMUSCULAR; INTRAVENOUS; SUBCUTANEOUS at 15:47

## 2023-09-02 RX ADMIN — ATORVASTATIN CALCIUM 40 MILLIGRAM(S): 80 TABLET, FILM COATED ORAL at 21:54

## 2023-09-02 RX ADMIN — Medication 3 MILLILITER(S): at 20:51

## 2023-09-02 RX ADMIN — BUMETANIDE 2 MILLIGRAM(S): 0.25 INJECTION INTRAMUSCULAR; INTRAVENOUS at 05:49

## 2023-09-02 RX ADMIN — HEPARIN SODIUM 1600 UNIT(S)/HR: 5000 INJECTION INTRAVENOUS; SUBCUTANEOUS at 19:15

## 2023-09-02 RX ADMIN — INSULIN GLARGINE 15 UNIT(S): 100 INJECTION, SOLUTION SUBCUTANEOUS at 08:15

## 2023-09-02 RX ADMIN — BUMETANIDE 2 MILLIGRAM(S): 0.25 INJECTION INTRAMUSCULAR; INTRAVENOUS at 17:07

## 2023-09-02 RX ADMIN — Medication 2000 UNIT(S): at 11:38

## 2023-09-02 RX ADMIN — Medication 6: at 21:55

## 2023-09-02 RX ADMIN — HYDROMORPHONE HYDROCHLORIDE 0.5 MILLIGRAM(S): 2 INJECTION INTRAMUSCULAR; INTRAVENOUS; SUBCUTANEOUS at 10:10

## 2023-09-02 RX ADMIN — OXYCODONE HYDROCHLORIDE 60 MILLIGRAM(S): 5 TABLET ORAL at 05:48

## 2023-09-02 RX ADMIN — LORATADINE 10 MILLIGRAM(S): 10 TABLET ORAL at 11:38

## 2023-09-02 RX ADMIN — Medication 3 MILLILITER(S): at 14:34

## 2023-09-02 RX ADMIN — HEPARIN SODIUM 1600 UNIT(S)/HR: 5000 INJECTION INTRAVENOUS; SUBCUTANEOUS at 11:39

## 2023-09-02 RX ADMIN — BUDESONIDE AND FORMOTEROL FUMARATE DIHYDRATE 2 PUFF(S): 160; 4.5 AEROSOL RESPIRATORY (INHALATION) at 20:50

## 2023-09-02 RX ADMIN — LACTULOSE 20 GRAM(S): 10 SOLUTION ORAL at 11:41

## 2023-09-02 RX ADMIN — Medication 0.25 MILLIGRAM(S): at 22:47

## 2023-09-02 RX ADMIN — Medication 100 MILLIGRAM(S): at 17:06

## 2023-09-02 RX ADMIN — HEPARIN SODIUM 1600 UNIT(S)/HR: 5000 INJECTION INTRAVENOUS; SUBCUTANEOUS at 07:06

## 2023-09-02 RX ADMIN — Medication 1 APPLICATION(S): at 11:38

## 2023-09-02 RX ADMIN — Medication 8: at 17:06

## 2023-09-02 RX ADMIN — HEPARIN SODIUM 1600 UNIT(S)/HR: 5000 INJECTION INTRAVENOUS; SUBCUTANEOUS at 14:09

## 2023-09-02 RX ADMIN — HEPARIN SODIUM 1600 UNIT(S)/HR: 5000 INJECTION INTRAVENOUS; SUBCUTANEOUS at 21:55

## 2023-09-02 RX ADMIN — HEPARIN SODIUM 1600 UNIT(S)/HR: 5000 INJECTION INTRAVENOUS; SUBCUTANEOUS at 07:34

## 2023-09-02 RX ADMIN — Medication 81 MILLIGRAM(S): at 11:38

## 2023-09-02 RX ADMIN — Medication 3 MILLILITER(S): at 08:08

## 2023-09-02 RX ADMIN — NYSTATIN CREAM 1 APPLICATION(S): 100000 CREAM TOPICAL at 17:07

## 2023-09-02 RX ADMIN — Medication 3 MILLILITER(S): at 02:29

## 2023-09-02 RX ADMIN — HYDROMORPHONE HYDROCHLORIDE 0.5 MILLIGRAM(S): 2 INJECTION INTRAMUSCULAR; INTRAVENOUS; SUBCUTANEOUS at 16:45

## 2023-09-02 NOTE — PROGRESS NOTE ADULT - ASSESSMENT
69 y/o morbidly obese female with h/o asthma on home o2, HFp EF, spinal stenosis, dm, ckd, , dvt lt. leg in 2004, treated and resolved came in for increasing sob for past 1 week. pt. stated that she has baseline chronic sob but last week was more than her baseline and weakness. As per pt. she gets oob and uses walker. In the ER  first trop 0.09 , d dimer 407, due to pt's weight she exceeds the wt. limit for ct. Cardiology following, continue diuresis, Heparin drip though low suspicion for clot however continues for potential ACS, unable to stress test, unable to perform CTA / VQ scan, LHC / RHC due to weight, Pulm following, severe Pulm HTN, vague h/o Asthma, patient is at baseline oxygenation, however could benefit still from further diuresis.     #Acute on Chronic HFpEF   ·  ECHO performed with 55% EF, moderate reduced RV function, severe RVSP elevation   - cardiology [facility] team following, continues on diuresis with Bumex IV Q12, will monitor urine output, if needed can be switched to bumex gtt, unable to perform LHC / Stress test due to weight, Stress ECHO could be done but is of limited utility in this case     #Elevated troponin.   first trop 0.09, no active cp, trended down to .06, asa, statin on board, hold b.mando has h/o asthma, follow cardiology team recommendations.  nonspecific T wave abnorms, continues on Heparin drip, however unable to perform Stress / LHC as above      #Pulmonary HTN   per ECHO RVSP severely elevated > 70, likely unable to obtain RHC given weight, possibly Type II/III   Trial of bipap unlikely to succeed as per pulm  aggressive diuresis  symbicort w/ spacer BID    #Obesity hypoventilation syndrome   Patient does not use CPAP/Bipap at home, claustrophobic and does not like full face mask  Trial of BiPAP with nasal mask this evening     #Acute on Chronic respiratory failure with hypoxia  Multifactorial- restrictive lung disease 2/2 morbid obesity, HFpEF exacerbation / Severe pulm HTN  Pulm following, short course of nebs, wean to PRN, symbicort with spacer ordered given h/o asthma, with short course of medrol   - Patient at near baseline O2 given she uses 4L NC at home     #JACQUES on CKD stage 3    ·  Plan: Cr 2.16 somewhat close to baseline however requires further diuresis, avoid nephrotoxic meds, hold lisinopril for now  close f/u on Cr while on iv lasix  Will consult nephrology if consistent elevation while diuresing.    #DM (diabetes mellitus).    pt's Blood gluocse 75 on arrival, will keep on less dose of lantus , admelog scale and adjust per BG response.  SSI    #Chronic Edema   Lymphedema noted in B/L LE's, chronic changes of Abdomen with weeping while lifting pannus   Wound care following   Skin maceration in multiple fold areas, Dressing over L. groin wound under pannus     #Wound of foot.   rt. foot wound above 5th toe , recently got skin laceration, will keep on doxycycline  X ray foot shows no signs of foreign body / OM, nonspecific edema of foot   Podiatry consulted, however primarily chronic changes of both feet     #Morbid Obesity   Patient > 580 lbs, greatly impacting potential health care, unable to use CT imaging, NM stress, V/Q scan    #Chronic pain  on oxy IR 60mg Q12  start dilaudid PRN for breakthrough pain    #Anxiety  likely in the setting of chronic pain and chronic pulmonary disease  start xanax 0.25mg Q12    Fall at home - Severely deconditioned - PT eval .     DVT prophylaxis: Heparin drip as patient may have underlying VTE  Diet: DASH   Dispo: PendingPT eval and cardio recs.

## 2023-09-02 NOTE — PROGRESS NOTE ADULT - ASSESSMENT
ambulatory This is 69 y/o female with hx of HFpEF, on home O2 4L, pulmonary HTN, morbid obesity, asthma, CKD3, IDDM2, uterine cancer s/p MEGAN, DVT LLE (2004), chronic leg edema 2/2 venous stasis, spinal stenosis with chronic back pain who presents with SOB. Pt states that she has chronic difficulty breathing but it got even worse than normal over the past week. Pt ambulates with a walker. She fell and sustained right foot laceration a few days ago. Today she wasn't able to get out of bed due to dyspnea and pain and family called EMS. In the ED pt found with elevated D-dimer (407) and troponin  (0.09) and was started on Heparin drip. Cr 1.89, proBNP 1623. Pt follows with a cardiologist from [place]. Pt states that she's been having daily, intermittent chest pain for years without any recent change. She had an abnormal stress test in the past but no cardiac catheterization was performed. Echocardiogram in 10/2022 showed EF 50-55% and no significant valvular abnormalities.

## 2023-09-02 NOTE — PROGRESS NOTE ADULT - ASSESSMENT
Assess    Acute on chronic hypoxemic resp failure/OHS-mild by serum CO2/ENRIQUE  HFpEF, severe Pulmonary Htn, never smoker hx "asthma"- PFts restrictive 2019  Morbid obesity- limited mobilty, hx Falls at home  At risk for PE - unable to assess due to size and elevated Cr  02 requirement 4-5L appears near baseline, no bronchospasm on exam  Trial of Bipap unlikely to succeed - patient intolerant    Plan    short course of nebs, wean to prn, Symbicort with spacer bid  empiric abx/ short course of medrol as clinically indicated  Aggressive Diuresis  Empiric heparin for now high dose prophylaxis an option  Intermittent calf compression when possible  prognosis extremely guarded  Continuous 02, monitored bed for now

## 2023-09-02 NOTE — PROGRESS NOTE ADULT - PROBLEM SELECTOR PROBLEM 6
R/O Acute on chronic diastolic congestive heart failure
R/O Acute on chronic diastolic congestive heart failure

## 2023-09-02 NOTE — PROGRESS NOTE ADULT - NS ATTEND AMEND GEN_ALL_CORE FT
Patient was seen and examined at bedside and appears to be profoundly volume overloaded; started on Lasix 40gmIVP q 12hrs   D-dimer 407 and age adjusted 345 µg/L Age-adjusted D-dimer cutoff, DDU VTE possible Reported D-dimer is greater than cutoff; consider confirmatory testing with CTA or V/Q scan  US lower extremity edema: Markedly limited visualization. Unable to perform compression. Popliteal  vein and calf veins not visualized. No evidence of deep venous thrombosis in either lower extremity.  Trops 0.09->0.08->0.06  TTE: Left ventricular ejection fraction, by visual estimation, is 55 to 60%. Moderately enlarged right ventricle. Moderately reduced RV systolic function on limited views.  Estimated pulmonary artery systolic pressure is 70.9 mmHg assuming a right atrial pressure of 5 mmHg, which is consistent with severe pulmonary hypertension.    69 y/o female with hx of HFpEF, on home O2 4L, pulmonary HTN, morbid obesity, asthma, CKD3, IDDM2, uterine cancer s/p MEGAN, DVT LLE (), chronic leg edema 2/2 venous stasis, spinal stenosis with chronic back pain who presents with SOB. Pt states that she has chronic difficulty breathing but it got even worse than normal over the past week. Pt ambulates with a walker. She fell and sustained right foot laceration a few days ago. Today she wasn't able to get out of bed due to dyspnea and pain and family called EMS; with acute on chronic decompensated HFpEF with possible PE and severe pulmonary hypertension (PASP of 70 mmHg)   - patient appears to be volume overloaded and on Bumex 2mg IVP q 12hrs; pending BMP  - monitor I/Os, keep K ~ 4 and Mag ~ 2; if outputs are not adequate may consider IV Bumex ggt   - reviewed Pulmonary note and somewhat hard pressed to stop hep GGT, with elevated D-Dimer and even age adjust 345µg/L Age-adjusted D-dimer cutoff, DDU VTE possible Reported D-dimer is greater than cutoff, morbid obesity, hx of prior DVT, hx of uterine cancer and evidence of severe PH on TTE compared to TTE done in 2020, will leave this to the discretion of hospitalist and Pulmonary   - Precluded in performing any ischemic eval i.e. NM Myocardial or LHC/ RHC due to weight, as the limit on the table is 500 lbs and would perform  stress echo but the TTE due ot body habitus will have poor windows and not ideal to perform this was all explained to the patient and appears to understand.  - severe pulmonary hypertension likely due to underlying morbid obesity, vs ENRIQUE VS L sided HF / HFpEF- continue with diuresis, will need sleep study     will continue to follow     Sandra Kennedy D.O. [facility]  Cardiology/Vascular Cardiology -[facility] Cardiology   Telephone # [phone]

## 2023-09-03 LAB
ANION GAP SERPL CALC-SCNC: 13 MMOL/L — SIGNIFICANT CHANGE UP (ref 5–17)
APTT BLD: 66.7 SEC — HIGH (ref 24.5–35.6)
BUN SERPL-MCNC: 56.3 MG/DL — HIGH (ref 8–20)
CALCIUM SERPL-MCNC: 9.6 MG/DL — SIGNIFICANT CHANGE UP (ref 8.4–10.5)
CHLORIDE SERPL-SCNC: 94 MMOL/L — LOW (ref 96–108)
CO2 SERPL-SCNC: 32 MMOL/L — HIGH (ref 22–29)
CREAT ?TM UR-MCNC: 19 MG/DL — SIGNIFICANT CHANGE UP
CREAT SERPL-MCNC: 2.36 MG/DL — HIGH (ref 0.5–1.3)
EGFR: 22 ML/MIN/1.73M2 — LOW
GLUCOSE BLDC GLUCOMTR-MCNC: 301 MG/DL — HIGH (ref 70–99)
GLUCOSE BLDC GLUCOMTR-MCNC: 352 MG/DL — HIGH (ref 70–99)
GLUCOSE BLDC GLUCOMTR-MCNC: 365 MG/DL — HIGH (ref 70–99)
GLUCOSE BLDC GLUCOMTR-MCNC: 402 MG/DL — HIGH (ref 70–99)
GLUCOSE SERPL-MCNC: 300 MG/DL — HIGH (ref 70–99)
HCT VFR BLD CALC: 34.7 % — SIGNIFICANT CHANGE UP (ref 34.5–45)
HGB BLD-MCNC: 10.8 G/DL — LOW (ref 11.5–15.5)
MCHC RBC-ENTMCNC: 28.1 PG — SIGNIFICANT CHANGE UP (ref 27–34)
MCHC RBC-ENTMCNC: 31.1 GM/DL — LOW (ref 32–36)
MCV RBC AUTO: 90.1 FL — SIGNIFICANT CHANGE UP (ref 80–100)
PLATELET # BLD AUTO: 205 K/UL — SIGNIFICANT CHANGE UP (ref 150–400)
POTASSIUM SERPL-MCNC: 5.1 MMOL/L — SIGNIFICANT CHANGE UP (ref 3.5–5.3)
POTASSIUM SERPL-SCNC: 5.1 MMOL/L — SIGNIFICANT CHANGE UP (ref 3.5–5.3)
PROT ?TM UR-MCNC: 5 MG/DL — SIGNIFICANT CHANGE UP (ref 0–12)
PROT/CREAT UR-RTO: 0.3 RATIO — HIGH
RBC # BLD: 3.85 M/UL — SIGNIFICANT CHANGE UP (ref 3.8–5.2)
RBC # FLD: 16.1 % — HIGH (ref 10.3–14.5)
SODIUM SERPL-SCNC: 139 MMOL/L — SIGNIFICANT CHANGE UP (ref 135–145)
WBC # BLD: 13.35 K/UL — HIGH (ref 3.8–10.5)
WBC # FLD AUTO: 13.35 K/UL — HIGH (ref 3.8–10.5)

## 2023-09-03 PROCEDURE — 99232 SBSQ HOSP IP/OBS MODERATE 35: CPT

## 2023-09-03 PROCEDURE — 99233 SBSQ HOSP IP/OBS HIGH 50: CPT

## 2023-09-03 PROCEDURE — 99223 1ST HOSP IP/OBS HIGH 75: CPT

## 2023-09-03 RX ORDER — HYDROMORPHONE HYDROCHLORIDE 2 MG/ML
1 INJECTION INTRAMUSCULAR; INTRAVENOUS; SUBCUTANEOUS
Refills: 0 | Status: DISCONTINUED | OUTPATIENT
Start: 2023-09-03 | End: 2023-09-06

## 2023-09-03 RX ORDER — KETOTIFEN FUMARATE 0.34 MG/ML
1 SOLUTION OPHTHALMIC
Refills: 0 | Status: DISCONTINUED | OUTPATIENT
Start: 2023-09-03 | End: 2023-09-13

## 2023-09-03 RX ADMIN — NYSTATIN CREAM 1 APPLICATION(S): 100000 CREAM TOPICAL at 17:16

## 2023-09-03 RX ADMIN — HEPARIN SODIUM 1600 UNIT(S)/HR: 5000 INJECTION INTRAVENOUS; SUBCUTANEOUS at 19:51

## 2023-09-03 RX ADMIN — Medication 100 MILLIGRAM(S): at 17:16

## 2023-09-03 RX ADMIN — CHLORHEXIDINE GLUCONATE 1 APPLICATION(S): 213 SOLUTION TOPICAL at 05:27

## 2023-09-03 RX ADMIN — OXYCODONE HYDROCHLORIDE 60 MILLIGRAM(S): 5 TABLET ORAL at 05:27

## 2023-09-03 RX ADMIN — HEPARIN SODIUM 1600 UNIT(S)/HR: 5000 INJECTION INTRAVENOUS; SUBCUTANEOUS at 03:29

## 2023-09-03 RX ADMIN — BUDESONIDE AND FORMOTEROL FUMARATE DIHYDRATE 2 PUFF(S): 160; 4.5 AEROSOL RESPIRATORY (INHALATION) at 20:42

## 2023-09-03 RX ADMIN — LACTULOSE 20 GRAM(S): 10 SOLUTION ORAL at 05:26

## 2023-09-03 RX ADMIN — Medication 81 MILLIGRAM(S): at 11:40

## 2023-09-03 RX ADMIN — Medication 10: at 11:41

## 2023-09-03 RX ADMIN — HYDROMORPHONE HYDROCHLORIDE 1 MILLIGRAM(S): 2 INJECTION INTRAMUSCULAR; INTRAVENOUS; SUBCUTANEOUS at 11:00

## 2023-09-03 RX ADMIN — Medication 1 DROP(S): at 17:32

## 2023-09-03 RX ADMIN — Medication 1 DROP(S): at 05:37

## 2023-09-03 RX ADMIN — Medication 100 MILLIGRAM(S): at 05:27

## 2023-09-03 RX ADMIN — OXYCODONE HYDROCHLORIDE 60 MILLIGRAM(S): 5 TABLET ORAL at 06:05

## 2023-09-03 RX ADMIN — Medication 12: at 17:15

## 2023-09-03 RX ADMIN — HEPARIN SODIUM 1600 UNIT(S)/HR: 5000 INJECTION INTRAVENOUS; SUBCUTANEOUS at 07:25

## 2023-09-03 RX ADMIN — Medication 20 MILLIGRAM(S): at 05:29

## 2023-09-03 RX ADMIN — BUMETANIDE 2 MILLIGRAM(S): 0.25 INJECTION INTRAMUSCULAR; INTRAVENOUS at 05:27

## 2023-09-03 RX ADMIN — Medication 3 MILLILITER(S): at 14:51

## 2023-09-03 RX ADMIN — ATORVASTATIN CALCIUM 40 MILLIGRAM(S): 80 TABLET, FILM COATED ORAL at 22:54

## 2023-09-03 RX ADMIN — Medication 1 APPLICATION(S): at 11:42

## 2023-09-03 RX ADMIN — LORATADINE 10 MILLIGRAM(S): 10 TABLET ORAL at 11:40

## 2023-09-03 RX ADMIN — BUMETANIDE 2 MILLIGRAM(S): 0.25 INJECTION INTRAMUSCULAR; INTRAVENOUS at 17:23

## 2023-09-03 RX ADMIN — Medication 0.25 MILLIGRAM(S): at 22:54

## 2023-09-03 RX ADMIN — KETOTIFEN FUMARATE 1 DROP(S): 0.34 SOLUTION OPHTHALMIC at 17:33

## 2023-09-03 RX ADMIN — POLYETHYLENE GLYCOL 3350 17 GRAM(S): 17 POWDER, FOR SOLUTION ORAL at 11:40

## 2023-09-03 RX ADMIN — Medication 6: at 22:53

## 2023-09-03 RX ADMIN — Medication 3 MILLILITER(S): at 08:02

## 2023-09-03 RX ADMIN — HYDROMORPHONE HYDROCHLORIDE 1 MILLIGRAM(S): 2 INJECTION INTRAMUSCULAR; INTRAVENOUS; SUBCUTANEOUS at 10:34

## 2023-09-03 RX ADMIN — INSULIN GLARGINE 15 UNIT(S): 100 INJECTION, SOLUTION SUBCUTANEOUS at 09:31

## 2023-09-03 RX ADMIN — Medication 2000 UNIT(S): at 11:41

## 2023-09-03 RX ADMIN — Medication 3 MILLILITER(S): at 20:41

## 2023-09-03 RX ADMIN — BUDESONIDE AND FORMOTEROL FUMARATE DIHYDRATE 2 PUFF(S): 160; 4.5 AEROSOL RESPIRATORY (INHALATION) at 08:02

## 2023-09-03 RX ADMIN — NYSTATIN CREAM 1 APPLICATION(S): 100000 CREAM TOPICAL at 05:28

## 2023-09-03 RX ADMIN — Medication 3 MILLILITER(S): at 04:25

## 2023-09-03 RX ADMIN — OXYCODONE HYDROCHLORIDE 60 MILLIGRAM(S): 5 TABLET ORAL at 17:23

## 2023-09-03 RX ADMIN — INSULIN GLARGINE 15 UNIT(S): 100 INJECTION, SOLUTION SUBCUTANEOUS at 22:53

## 2023-09-03 RX ADMIN — Medication 8: at 08:05

## 2023-09-03 NOTE — DIETITIAN INITIAL EVALUATION ADULT - NS FNS DIET ORDER
Diet, DASH/TLC:   Sodium & Cholesterol Restricted  Consistent Carbohydrate {Evening Snack} (CSTCHOSN) (08-30-23 @ 19:09)

## 2023-09-03 NOTE — PROGRESS NOTE ADULT - ASSESSMENT
This is 69 y/o female with hx of HFpEF, on home O2 4L, pulmonary HTN, morbid obesity, asthma, CKD3, IDDM2, uterine cancer s/p MEGAN, DVT LLE (2004), chronic leg edema 2/2 venous stasis, spinal stenosis with chronic back pain who presents with SOB. Pt states that she has chronic difficulty breathing but it got even worse than normal over the past week. Pt ambulates with a walker. She fell and sustained right foot laceration a few days ago. Today she wasn't able to get out of bed due to dyspnea and pain and family called EMS. In the ED pt found with elevated D-dimer (407) and troponin  (0.09) and was started on Heparin drip. Cr 1.89, proBNP 1623. Pt follows with a cardiologist from [place]. Pt states that she's been having daily, intermittent chest pain for years without any recent change. She had an abnormal stress test in the past but no cardiac catheterization was performed. Echocardiogram in 10/2022 showed EF 50-55% and no significant valvular abnormalities.

## 2023-09-03 NOTE — DIETITIAN INITIAL EVALUATION ADULT - CALCULATED FROM (CAL/KG)
Detail Level: Simple Discussed repeating ILS better option than repeating LN2, since she responded better to ILS than LN2 in the past. [id number]

## 2023-09-03 NOTE — DIETITIAN INITIAL EVALUATION ADULT - OTHER INFO
69 y/o morbidly obese female with h/o asthma on home o2, HFp EF, spinal stenosis, dm, ckd, , dvt lt. leg in 2004, treated and resolved came in for increasing sob for past 1 week. pt. stated that she has baseline chronic sob but last week was more than her baseline and weakness. As per pt. she gets oob and uses walker. In the ER  first trop 0.09 , d dimer 407, due to pt's weight she exceeds the wt. limit for ct. Cardiology following, continue diuresis, Heparin drip though low suspicion for clot however continues for potential ACS, unable to stress test, unable to perform CTA / VQ scan, LHC / RHC due to weight, Pulm following, severe Pulm HTN, vague h/o Asthma, patient is at baseline oxygenation, however could benefit still from further diuresis.   Acute on Chronic HFpEF

## 2023-09-03 NOTE — DIETITIAN INITIAL EVALUATION ADULT - PERTINENT MEDS FT
MEDICATIONS  (STANDING):  albuterol/ipratropium for Nebulization 3 milliLiter(s) Nebulizer every 6 hours  artificial  tears Solution 1 Drop(s) Both EYES two times a day  aspirin enteric coated 81 milliGRAM(s) Oral daily  atorvastatin 40 milliGRAM(s) Oral at bedtime  budesonide 160 MICROgram(s)/formoterol 4.5 MICROgram(s) Inhaler 2 Puff(s) Inhalation two times a day  buMETAnide Injectable 2 milliGRAM(s) IV Push every 12 hours  cadexomer iodine 0.9% Gel 1 Application(s) Topical daily  chlorhexidine 2% Cloths 1 Application(s) Topical <User Schedule>  cholecalciferol 2000 Unit(s) Oral daily  dextrose 5%. 1000 milliLiter(s) (50 mL/Hr) IV Continuous <Continuous>  dextrose 5%. 1000 milliLiter(s) (100 mL/Hr) IV Continuous <Continuous>  dextrose 50% Injectable 25 Gram(s) IV Push once  dextrose 50% Injectable 12.5 Gram(s) IV Push once  dextrose 50% Injectable 25 Gram(s) IV Push once  doxycycline monohydrate Capsule 100 milliGRAM(s) Oral every 12 hours  glucagon  Injectable 1 milliGRAM(s) IntraMuscular once  heparin  Infusion.  Unit(s)/Hr (24 mL/Hr) IV Continuous <Continuous>  insulin glargine Injectable (LANTUS) 15 Unit(s) SubCutaneous every morning  insulin glargine Injectable (LANTUS) 15 Unit(s) SubCutaneous at bedtime  insulin lispro (ADMELOG) corrective regimen sliding scale   SubCutaneous at bedtime  insulin lispro (ADMELOG) corrective regimen sliding scale   SubCutaneous three times a day before meals  ketotifen 0.025% Ophthalmic Solution 1 Drop(s) Both EYES two times a day  loratadine 10 milliGRAM(s) Oral daily  nystatin Powder 1 Application(s) Topical every 12 hours  oxyCODONE  ER Tablet 60 milliGRAM(s) Oral every 12 hours  polyethylene glycol 3350 17 Gram(s) Oral daily  predniSONE   Tablet   Oral   predniSONE   Tablet 20 milliGRAM(s) Oral daily    MEDICATIONS  (PRN):  albuterol    0.083% 2.5 milliGRAM(s) Nebulizer every 4 hours PRN Shortness of Breath and/or Wheezing  ALPRAZolam 0.25 milliGRAM(s) Oral every 12 hours PRN Anxiety  dextrose Oral Gel 15 Gram(s) Oral once PRN Blood Glucose LESS THAN 70 milliGRAM(s)/deciliter  heparin   Injectable 92395 Unit(s) IV Push every 6 hours PRN For aPTT less than 40  heparin   Injectable 5000 Unit(s) IV Push every 6 hours PRN For aPTT between 40 - 57  HYDROmorphone  Injectable 1 milliGRAM(s) IV Push every 3 hours PRN Severe Pain (7 - 10)  lactulose Syrup 20 Gram(s) Oral every 8 hours PRN Constipation

## 2023-09-03 NOTE — CONSULT NOTE ADULT - ASSESSMENT
68 year old female with PMH of morbidly obesity (500+lbs), DM, HTN, HLD, asthma, HFpEF, chronic hypoxic respiratory failure on 4L home oxygen, CKD, spinal stenosis (cervical spine + lumbar spine) and hx of DVT presents with worsening SOB. CXR showed congestive changes. Admitted for decompensated heart failure. TTE showed diastolic dysfunction and severe pulmonary HTN. Right / left heart cath and nuclear stress test not performed as patient exceeds weight limit of cath lab tables as per Cardiology. LE dopplers negative for DVT. Currently on escalating doses of IV diuretics. Nephrology is consulted for worsening JACQUES on CKD.     -Suspecting cardiorenal syndrome   -Baseline creatinine is ~1.6-1.8mg/dL in Oct 2022 (as per [facility])  -On admission, creatinine was 1.89mg/dL   -However up trending since admission, latest creatinine is 2.3mg/dL   -Will order urine studies and renal ultrasound   -BP acceptable  -Volume status - hypervolemic - continue current diuretic regimen   -If renal function continue to decline, will consider trial of aquapheresis     Margaux Easley DO  Nephrology    68 year old female with PMH of morbidly obesity (500+lbs), DM, HTN, HLD, asthma, HFpEF, chronic hypoxic respiratory failure on 4L home oxygen, CKD, spinal stenosis (cervical spine + lumbar spine) and hx of DVT presents with worsening SOB. CXR showed congestive changes. Admitted for decompensated heart failure. TTE showed diastolic dysfunction and severe pulmonary HTN. Right / left heart cath and nuclear stress test not performed as patient exceeds weight limit of cath lab tables as per Cardiology. LE dopplers negative for DVT. Currently on escalating doses of IV diuretics. Nephrology is consulted for worsening JACQUES on CKD.     -Suspecting cardiorenal syndrome   -Baseline creatinine is ~1.6-1.8mg/dL in Oct 2022 (as per [facility])  -On admission, creatinine was 1.89mg/dL   -However up trending since admission, latest creatinine is 2.3mg/dL   -Will order urine studies and renal ultrasound   -BP acceptable  -Volume status - hypervolemic - continue current diuretic regimen - will add 1L fluid restriction to DASH diet   -If renal function continue to decline, will consider trial of aquapheresis     Margaux Easley DO  Nephrology    68 year old female with PMH of morbidly obesity (500+lbs), DM, HTN, HLD, asthma, HFpEF, chronic hypoxic respiratory failure on 4L home oxygen, CKD, spinal stenosis (cervical spine + lumbar spine) and hx of DVT presents with worsening SOB. CXR showed congestive changes. Admitted for decompensated heart failure. TTE showed diastolic dysfunction and severe pulmonary HTN. Right / left heart cath and nuclear stress test not performed as patient exceeds weight limit of cath lab tables as per Cardiology. LE dopplers negative for DVT. Currently on escalating doses of IV diuretics. Nephrology is consulted for worsening JACQUES on CKD.     -Suspecting cardiorenal syndrome   -Baseline creatinine is ~1.6-1.8mg/dL in Oct 2022 (as per [facility])  -On admission, creatinine was 1.89mg/dL   -However up trending since admission, latest creatinine is 2.3mg/dL   -Will order urine studies and renal ultrasound   -BP acceptable  -Volume status - hypervolemic - continue current diuretic regimen - will add 1L fluid restriction to DASH diet   -If renal function continue to decline, will consider aquapheresis     Margaux Easley, DO  Nephrology

## 2023-09-03 NOTE — DIETITIAN INITIAL EVALUATION ADULT - PERTINENT LABORATORY DATA
09-03    139  |  94<L>  |  56.3<H>  ----------------------------<  300<H>  5.1   |  32.0<H>  |  2.36<H>    Ca    9.6      03 Sep 2023 05:32    POCT Blood Glucose.: 352 mg/dL (09-03-23 @ 11:38)  A1C with Estimated Average Glucose Result: 6.5 % (08-31-23 @ 10:25)  A1C with Estimated Average Glucose Result: 7.3 % (10-13-22 @ 11:47)

## 2023-09-03 NOTE — DIETITIAN INITIAL EVALUATION ADULT - ORAL INTAKE PTA/DIET HISTORY
Pt found eating lunch, reports fair appetite and PO intake. Pt unsure of current weight. Reports approx 350lbs, weight per chart 575 lbs.

## 2023-09-03 NOTE — CONSULT NOTE ADULT - SUBJECTIVE AND OBJECTIVE BOX
68 year old female with PMH of morbidly obesity (500+lbs), DM, HTN, HLD, asthma, HFpEF, chronic hypoxic respiratory failure on 4L home oxygen, CKD, spinal stenosis (cervical spine + lumbar spine) and hx of DVT presents with worsening SOB. CXR showed congestive changes. Admitted for decompensated heart failure. TTE showed diastolic dysfunction and severe pulmonary HTN. Right / left heart cath and nuclear stress test not performed as patient exceeds weight limit of cath lab tables as per cardiology. LE dopplers negative for DVT. Currently on escalating doses of IV diuretics. Nephrology is consulted for worsening JACQUES on CKD.     PAST MEDICAL & SURGICAL HISTORY:  Diabetes Mellitus Type II      HTN (Hypertension)      Endometrial Hyperplasia      Cervical Stenosis of Spine      Spinal Stenosis, Lumbar      Deep Vein Thrombosis (DVT)  Left leg, 2004, treated and resolved      Dyslipidemia      Cataract      Morbid Obesity      Vitamin D deficiency      Insomnia      CKD (chronic kidney disease)  ~ III      Congestive heart failure  ~ HFpEF      Uterine cancer      Asthma      On home oxygen therapy      Gait difficulty  ~ u ses walker      Cataract extracted with lens implant 1998  Right      C Section 1994      Cholecystectomy/appendectomy @ age 25      D&C x2 1980's      D&C 2008  hysteroscopy, endometrial hyperplasia, 2009      Cervical Spinal Stenosis surgery x2 (01/2002, 7/2002)      Tonsillectomy as a child      Endometrial biopsy 12/02/09      H/O laser iridotomy  left eye, 2016      H/O colonoscopy  1998      S/P total abdominal hysterectomy and bilateral salpingo-oophorectomy      S/P appendectomy      S/P cholecystectomy    Allergies    adhesives (Rash)  latex (Rash)  wool- rash, itch (Other)  Bactrim (Flushing)    Intolerances    Home Medications:  ADVAIR -21 MCG INHALER: TAKE 2 PUFFS BY MOUTH TWICE A DAY (13 Oct 2022 13:16)  Aspirin Enteric Coated 81 mg oral delayed release tablet: 1 tab(s) orally once a day (13 Oct 2022 13:16)  furosemide 40 mg oral tablet: 1 tab(s) orally once a day (26 Oct 2022 13:09)  lisinopril 5 mg oral tablet: 1 tab(s) orally once a day (30 Aug 2023 20:40)  loratadine 10 mg oral tablet: 1 tab(s) orally once a day (26 Oct 2022 13:09)  NovoLOG FlexPen 100 units/mL injectable solution: 22 unit(s) injectable 3 times a day (before meals) (26 Oct 2022 15:04)  nystatin 100,000 units/g topical powder: 1 application topically 2 times a day (13 Oct 2022 13:16)  ondansetron 4 mg oral tablet: 1 tab(s) orally 3 times a day, As Needed (13 Oct 2022 13:16)  OxyCONTIN 60 mg oral tablet, extended release: 1 tab(s) orally every 12 hours (13 Oct 2022 13:16)  polyethylene glycol 3350 oral powder for reconstitution: 17 gram(s) orally once a day (30 Aug 2023 20:37)  ProAir HFA 90 mcg/inh inhalation aerosol: 2 puff(s) inhaled every 6 hours, As Needed (13 Oct 2022 13:16)  rosuvastatin 20 mg oral tablet: 1 tab(s) orally once a day (at bedtime) (13 Oct 2022 13:16)  Toujeo SoloStar 300 units/mL subcutaneous solution: 60 unit(s) subcutaneous 2 times a day - once in AM and once at bedtime (26 Oct 2022 15:04)  Vitamin D2 2000 intl units (50 mcg) oral capsule: 1 cap(s) orally once a day (13 Oct 2022 13:16)    FAMILY HISTORY:  Family history of pancreatic cancer    Family history of bladder cancer    Family history of lung cancer (Grandparent)    Social History:    ROS: +LE edema, +abdominal distension     Vital Signs Last 24 Hrs  T(C): 37.3 (03 Sep 2023 10:44), Max: 37.3 (03 Sep 2023 10:44)  T(F): 99.1 (03 Sep 2023 10:44), Max: 99.1 (03 Sep 2023 10:44)  HR: 82 (03 Sep 2023 15:52) (73 - 86)  BP: 126/53 (03 Sep 2023 10:44) (123/55 - 128/58)  BP(mean): --  RR: 19 (03 Sep 2023 10:44) (19 - 20)  SpO2: 94% (03 Sep 2023 15:52) (92% - 95%)    Parameters below as of 03 Sep 2023 15:52  Patient On (Oxygen Delivery Method): nasal cannula    I&O's Summary    02 Sep 2023 07:01  -  03 Sep 2023 07:00  --------------------------------------------------------  IN: 1412 mL / OUT: 5900 mL / NET: -4488 mL    03 Sep 2023 07:01  -  03 Sep 2023 16:28  --------------------------------------------------------  IN: 0 mL / OUT: 1000 mL / NET: -1000 mL    09-03    139  |  94<L>  |  56.3<H>  ----------------------------<  300<H>  5.1   |  32.0<H>  |  2.36<H>    Ca    9.6      03 Sep 2023 05:32               10.8   13.35 )-----------( 205      ( 03 Sep 2023 05:32 )             34.7     MEDICATIONS  (STANDING):  albuterol/ipratropium for Nebulization 3 milliLiter(s) Nebulizer every 6 hours  artificial  tears Solution 1 Drop(s) Both EYES two times a day  aspirin enteric coated 81 milliGRAM(s) Oral daily  atorvastatin 40 milliGRAM(s) Oral at bedtime  budesonide 160 MICROgram(s)/formoterol 4.5 MICROgram(s) Inhaler 2 Puff(s) Inhalation two times a day  buMETAnide Injectable 2 milliGRAM(s) IV Push every 12 hours  cadexomer iodine 0.9% Gel 1 Application(s) Topical daily  chlorhexidine 2% Cloths 1 Application(s) Topical <User Schedule>  cholecalciferol 2000 Unit(s) Oral daily  dextrose 5%. 1000 milliLiter(s) (50 mL/Hr) IV Continuous <Continuous>  dextrose 5%. 1000 milliLiter(s) (100 mL/Hr) IV Continuous <Continuous>  dextrose 50% Injectable 25 Gram(s) IV Push once  dextrose 50% Injectable 12.5 Gram(s) IV Push once  dextrose 50% Injectable 25 Gram(s) IV Push once  doxycycline monohydrate Capsule 100 milliGRAM(s) Oral every 12 hours  glucagon  Injectable 1 milliGRAM(s) IntraMuscular once  heparin  Infusion.  Unit(s)/Hr (24 mL/Hr) IV Continuous <Continuous>  insulin glargine Injectable (LANTUS) 15 Unit(s) SubCutaneous at bedtime  insulin glargine Injectable (LANTUS) 15 Unit(s) SubCutaneous every morning  insulin lispro (ADMELOG) corrective regimen sliding scale   SubCutaneous at bedtime  insulin lispro (ADMELOG) corrective regimen sliding scale   SubCutaneous three times a day before meals  ketotifen 0.025% Ophthalmic Solution 1 Drop(s) Both EYES two times a day  loratadine 10 milliGRAM(s) Oral daily  nystatin Powder 1 Application(s) Topical every 12 hours  oxyCODONE  ER Tablet 60 milliGRAM(s) Oral every 12 hours  polyethylene glycol 3350 17 Gram(s) Oral daily  predniSONE   Tablet   Oral   predniSONE   Tablet 20 milliGRAM(s) Oral daily    MEDICATIONS  (PRN):  albuterol    0.083% 2.5 milliGRAM(s) Nebulizer every 4 hours PRN Shortness of Breath and/or Wheezing  ALPRAZolam 0.25 milliGRAM(s) Oral every 12 hours PRN Anxiety  dextrose Oral Gel 15 Gram(s) Oral once PRN Blood Glucose LESS THAN 70 milliGRAM(s)/deciliter  heparin   Injectable 29587 Unit(s) IV Push every 6 hours PRN For aPTT less than 40  heparin   Injectable 5000 Unit(s) IV Push every 6 hours PRN For aPTT between 40 - 57  HYDROmorphone  Injectable 1 milliGRAM(s) IV Push every 3 hours PRN Severe Pain (7 - 10)  lactulose Syrup 20 Gram(s) Oral every 8 hours PRN Constipation

## 2023-09-03 NOTE — PROGRESS NOTE ADULT - NS ATTEND AMEND GEN_ALL_CORE FT
Patient was seen and examined at bedside and appears to be profoundly volume overloaded; started on Lasix 40gmIVP q 12hrs   D-dimer 407 and age adjusted 345 µg/L Age-adjusted D-dimer cutoff, DDU VTE possible Reported D-dimer is greater than cutoff; consider confirmatory testing with CTA or V/Q scan  US lower extremity edema: Markedly limited visualization. Unable to perform compression. Popliteal  vein and calf veins not visualized. No evidence of deep venous thrombosis in either lower extremity.  Trops 0.09->0.08->0.06  TTE: Left ventricular ejection fraction, by visual estimation, is 55 to 60%. Moderately enlarged right ventricle. Moderately reduced RV systolic function on limited views.  Estimated pulmonary artery systolic pressure is 70.9 mmHg assuming a right atrial pressure of 5 mmHg, which is consistent with severe pulmonary hypertension.    69 y/o female with hx of HFpEF, on home O2 4L, pulmonary HTN, morbid obesity, asthma, CKD3, IDDM2, uterine cancer s/p MEGAN, DVT LLE (), chronic leg edema 2/2 venous stasis, spinal stenosis with chronic back pain who presents with SOB. Pt states that she has chronic difficulty breathing but it got even worse than normal over the past week. Pt ambulates with a walker. She fell and sustained right foot laceration a few days ago. Today she wasn't able to get out of bed due to dyspnea and pain and family called EMS; with acute on chronic decompensated HFpEF with possible PE and severe pulmonary hypertension (PASP of 70 mmHg)   - patient appears to be volume overloaded and on Bumex 2mg IVP q 12hrs; Cr 2.36  - monitor I/Os, keep K ~ 4 and Mag ~ 2; if outputs are not adequate may consider IV Bumex ggt   - reviewed Pulmonary note and somewhat hard pressed to stop hep GGT, with elevated D-Dimer and even age adjust 345µg/L Age-adjusted D-dimer cutoff, DDU VTE possible Reported D-dimer is greater than cutoff, morbid obesity, hx of prior DVT, hx of uterine cancer and evidence of severe PH on TTE compared to TTE done in 2020, will leave this to the discretion of hospitalist and Pulmonary   - Precluded in performing any ischemic eval i.e. NM Myocardial or LHC/ RHC due to weight, as the limit on the table is 500 lbs and would perform  stress echo but the TTE due ot body habitus will have poor windows and not ideal to perform this was all explained to the patient and appears to understand.  - severe pulmonary hypertension likely due to underlying morbid obesity, vs ENRIQUE VS L sided HF / HFpEF- continue with diuresis, will need sleep study  - due to JACQUES on CKD - Nephrology consulted (possibly cardiorenal )      will continue to follow     Sandra Kennedy D.O. [facility]  Cardiology/Vascular Cardiology -[facility]   Telephone # [phone].

## 2023-09-04 LAB
ALBUMIN, RANDOM URINE: <1.2 MG/DL — SIGNIFICANT CHANGE UP
ALBUMIN/CREATININE RATIO (ACR): SIGNIFICANT CHANGE UP MG/G (ref 0–30)
ANION GAP SERPL CALC-SCNC: 11 MMOL/L — SIGNIFICANT CHANGE UP (ref 5–17)
APPEARANCE UR: CLEAR — SIGNIFICANT CHANGE UP
APTT BLD: 101.8 SEC — HIGH (ref 24.5–35.6)
APTT BLD: 68.2 SEC — HIGH (ref 24.5–35.6)
BACTERIA # UR AUTO: NEGATIVE — SIGNIFICANT CHANGE UP
BILIRUB UR-MCNC: NEGATIVE — SIGNIFICANT CHANGE UP
BUN SERPL-MCNC: 63.1 MG/DL — HIGH (ref 8–20)
CALCIUM SERPL-MCNC: 9.9 MG/DL — SIGNIFICANT CHANGE UP (ref 8.4–10.5)
CHLORIDE SERPL-SCNC: 89 MMOL/L — LOW (ref 96–108)
CO2 SERPL-SCNC: 36 MMOL/L — HIGH (ref 22–29)
COLOR SPEC: YELLOW — SIGNIFICANT CHANGE UP
CREAT ?TM UR-MCNC: 18 MG/DL — SIGNIFICANT CHANGE UP
CREAT SERPL-MCNC: 2.32 MG/DL — HIGH (ref 0.5–1.3)
DIFF PNL FLD: ABNORMAL
EGFR: 22 ML/MIN/1.73M2 — LOW
EPI CELLS # UR: SIGNIFICANT CHANGE UP
GLUCOSE BLDC GLUCOMTR-MCNC: 290 MG/DL — HIGH (ref 70–99)
GLUCOSE BLDC GLUCOMTR-MCNC: 291 MG/DL — HIGH (ref 70–99)
GLUCOSE BLDC GLUCOMTR-MCNC: 343 MG/DL — HIGH (ref 70–99)
GLUCOSE BLDC GLUCOMTR-MCNC: 349 MG/DL — HIGH (ref 70–99)
GLUCOSE BLDC GLUCOMTR-MCNC: 354 MG/DL — HIGH (ref 70–99)
GLUCOSE SERPL-MCNC: 302 MG/DL — HIGH (ref 70–99)
GLUCOSE UR QL: NEGATIVE MG/DL — SIGNIFICANT CHANGE UP
HCT VFR BLD CALC: 35.6 % — SIGNIFICANT CHANGE UP (ref 34.5–45)
HGB BLD-MCNC: 10.8 G/DL — LOW (ref 11.5–15.5)
KETONES UR-MCNC: NEGATIVE — SIGNIFICANT CHANGE UP
LEUKOCYTE ESTERASE UR-ACNC: NEGATIVE — SIGNIFICANT CHANGE UP
MCHC RBC-ENTMCNC: 27.5 PG — SIGNIFICANT CHANGE UP (ref 27–34)
MCHC RBC-ENTMCNC: 30.3 GM/DL — LOW (ref 32–36)
MCV RBC AUTO: 90.6 FL — SIGNIFICANT CHANGE UP (ref 80–100)
NITRITE UR-MCNC: NEGATIVE — SIGNIFICANT CHANGE UP
PH UR: 7 — SIGNIFICANT CHANGE UP (ref 5–8)
PLATELET # BLD AUTO: 214 K/UL — SIGNIFICANT CHANGE UP (ref 150–400)
POTASSIUM SERPL-MCNC: 4.7 MMOL/L — SIGNIFICANT CHANGE UP (ref 3.5–5.3)
POTASSIUM SERPL-SCNC: 4.7 MMOL/L — SIGNIFICANT CHANGE UP (ref 3.5–5.3)
PROT UR-MCNC: NEGATIVE — SIGNIFICANT CHANGE UP
RBC # BLD: 3.93 M/UL — SIGNIFICANT CHANGE UP (ref 3.8–5.2)
RBC # FLD: 15.9 % — HIGH (ref 10.3–14.5)
RBC CASTS # UR COMP ASSIST: NEGATIVE /HPF — SIGNIFICANT CHANGE UP (ref 0–4)
SODIUM SERPL-SCNC: 136 MMOL/L — SIGNIFICANT CHANGE UP (ref 135–145)
SP GR SPEC: 1 — LOW (ref 1.01–1.02)
UROBILINOGEN FLD QL: NEGATIVE MG/DL — SIGNIFICANT CHANGE UP
WBC # BLD: 13.79 K/UL — HIGH (ref 3.8–10.5)
WBC # FLD AUTO: 13.79 K/UL — HIGH (ref 3.8–10.5)
WBC UR QL: NEGATIVE /HPF — SIGNIFICANT CHANGE UP (ref 0–5)

## 2023-09-04 PROCEDURE — 99232 SBSQ HOSP IP/OBS MODERATE 35: CPT

## 2023-09-04 PROCEDURE — 99233 SBSQ HOSP IP/OBS HIGH 50: CPT

## 2023-09-04 RX ORDER — BUMETANIDE 0.25 MG/ML
2 INJECTION INTRAMUSCULAR; INTRAVENOUS DAILY
Refills: 0 | Status: DISCONTINUED | OUTPATIENT
Start: 2023-09-05 | End: 2023-09-05

## 2023-09-04 RX ADMIN — HEPARIN SODIUM 1300 UNIT(S)/HR: 5000 INJECTION INTRAVENOUS; SUBCUTANEOUS at 19:40

## 2023-09-04 RX ADMIN — Medication 2000 UNIT(S): at 12:06

## 2023-09-04 RX ADMIN — HYDROMORPHONE HYDROCHLORIDE 1 MILLIGRAM(S): 2 INJECTION INTRAMUSCULAR; INTRAVENOUS; SUBCUTANEOUS at 10:55

## 2023-09-04 RX ADMIN — OXYCODONE HYDROCHLORIDE 60 MILLIGRAM(S): 5 TABLET ORAL at 17:20

## 2023-09-04 RX ADMIN — POLYETHYLENE GLYCOL 3350 17 GRAM(S): 17 POWDER, FOR SOLUTION ORAL at 12:07

## 2023-09-04 RX ADMIN — Medication 100 MILLIGRAM(S): at 05:18

## 2023-09-04 RX ADMIN — Medication 3 MILLILITER(S): at 08:50

## 2023-09-04 RX ADMIN — HEPARIN SODIUM 1300 UNIT(S)/HR: 5000 INJECTION INTRAVENOUS; SUBCUTANEOUS at 07:21

## 2023-09-04 RX ADMIN — KETOTIFEN FUMARATE 1 DROP(S): 0.34 SOLUTION OPHTHALMIC at 05:21

## 2023-09-04 RX ADMIN — BUDESONIDE AND FORMOTEROL FUMARATE DIHYDRATE 2 PUFF(S): 160; 4.5 AEROSOL RESPIRATORY (INHALATION) at 08:53

## 2023-09-04 RX ADMIN — HEPARIN SODIUM 1300 UNIT(S)/HR: 5000 INJECTION INTRAVENOUS; SUBCUTANEOUS at 10:39

## 2023-09-04 RX ADMIN — ATORVASTATIN CALCIUM 40 MILLIGRAM(S): 80 TABLET, FILM COATED ORAL at 21:46

## 2023-09-04 RX ADMIN — NYSTATIN CREAM 1 APPLICATION(S): 100000 CREAM TOPICAL at 04:48

## 2023-09-04 RX ADMIN — Medication 6: at 21:47

## 2023-09-04 RX ADMIN — HEPARIN SODIUM 1300 UNIT(S)/HR: 5000 INJECTION INTRAVENOUS; SUBCUTANEOUS at 07:38

## 2023-09-04 RX ADMIN — Medication 100 MILLIGRAM(S): at 17:21

## 2023-09-04 RX ADMIN — CHLORHEXIDINE GLUCONATE 1 APPLICATION(S): 213 SOLUTION TOPICAL at 04:47

## 2023-09-04 RX ADMIN — Medication 1 DROP(S): at 05:21

## 2023-09-04 RX ADMIN — LORATADINE 10 MILLIGRAM(S): 10 TABLET ORAL at 12:07

## 2023-09-04 RX ADMIN — OXYCODONE HYDROCHLORIDE 60 MILLIGRAM(S): 5 TABLET ORAL at 05:18

## 2023-09-04 RX ADMIN — Medication 6: at 06:42

## 2023-09-04 RX ADMIN — NYSTATIN CREAM 1 APPLICATION(S): 100000 CREAM TOPICAL at 17:26

## 2023-09-04 RX ADMIN — Medication 1 APPLICATION(S): at 12:07

## 2023-09-04 RX ADMIN — BUMETANIDE 2 MILLIGRAM(S): 0.25 INJECTION INTRAMUSCULAR; INTRAVENOUS at 04:41

## 2023-09-04 RX ADMIN — Medication 3 MILLILITER(S): at 20:57

## 2023-09-04 RX ADMIN — INSULIN GLARGINE 15 UNIT(S): 100 INJECTION, SOLUTION SUBCUTANEOUS at 21:46

## 2023-09-04 RX ADMIN — KETOTIFEN FUMARATE 1 DROP(S): 0.34 SOLUTION OPHTHALMIC at 17:29

## 2023-09-04 RX ADMIN — Medication 8: at 12:06

## 2023-09-04 RX ADMIN — Medication 1 DROP(S): at 17:28

## 2023-09-04 RX ADMIN — HEPARIN SODIUM 1300 UNIT(S)/HR: 5000 INJECTION INTRAVENOUS; SUBCUTANEOUS at 16:20

## 2023-09-04 RX ADMIN — Medication 20 MILLIGRAM(S): at 05:18

## 2023-09-04 RX ADMIN — HYDROMORPHONE HYDROCHLORIDE 1 MILLIGRAM(S): 2 INJECTION INTRAMUSCULAR; INTRAVENOUS; SUBCUTANEOUS at 04:42

## 2023-09-04 RX ADMIN — Medication 8: at 17:20

## 2023-09-04 RX ADMIN — INSULIN GLARGINE 15 UNIT(S): 100 INJECTION, SOLUTION SUBCUTANEOUS at 08:11

## 2023-09-04 RX ADMIN — BUDESONIDE AND FORMOTEROL FUMARATE DIHYDRATE 2 PUFF(S): 160; 4.5 AEROSOL RESPIRATORY (INHALATION) at 22:12

## 2023-09-04 RX ADMIN — Medication 81 MILLIGRAM(S): at 12:07

## 2023-09-04 NOTE — PHYSICAL THERAPY INITIAL EVALUATION ADULT - ADDITIONAL COMMENTS
Pt states she has not been able to do much at home. Maybe a few small steps with walker. Has a toilet in her room and lives with two 27 y/o sons who work. States she hasn't been to a doctor in at least a year. Has a motorized w/c but reports she isn't safe on it anymore - slides out. Has a ramp to enter.

## 2023-09-04 NOTE — PROGRESS NOTE ADULT - ASSESSMENT
69 y/o morbidly obese female with h/o asthma on home o2, HFp EF, spinal stenosis, dm, ckd, , dvt lt. leg in 2004, treated and resolved came in for increasing sob for past 1 week. pt. stated that she has baseline chronic sob but last week was more than her baseline and weakness. As per pt. she gets oob and uses walker. In the ER  first trop 0.09 , d dimer 407, due to pt's weight she exceeds the wt. limit for ct. Cardiology following, continue diuresis, Heparin drip though low suspicion for clot however continues for potential ACS, unable to stress test, unable to perform CTA / VQ scan, LHC / RHC due to weight, Pulm following, severe Pulm HTN, vague h/o Asthma, patient is at baseline oxygenation, however could benefit still from further diuresis.     #Acute on Chronic HFpEF   ·  ECHO performed with 55% EF, moderate reduced RV function, severe RVSP elevation   - [facility] [place], continues on diuresis with Bumex IV Q12, will monitor urine output, if needed can be switched to bumex gtt, unable to perform LHC / Stress test due to weight, Stress ECHO could be done but is of limited utility in this case     #Elevated troponin.   first trop 0.09, no active cp, trended down to .06, asa, statin on board, hold b.mando has h/o asthma, follow cardiology team recommendations.  nonspecific T wave abnorms, continues on Heparin drip, however unable to perform Stress / LHC as above      #Pulmonary HTN   per ECHO RVSP severely elevated > 70, likely unable to obtain RHC given weight, possibly Type II/III   Trial of bipap unlikely to succeed as per pulm  aggressive diuresis  symbicort w/ spacer BID    #Obesity hypoventilation syndrome   Patient does not use CPAP/Bipap at home, claustrophobic and does not like full face mask  Trial of BiPAP with nasal mask this evening     #Acute on Chronic respiratory failure with hypoxia  Multifactorial- restrictive lung disease 2/2 morbid obesity, HFpEF exacerbation / Severe pulm HTN  Pulm following, short course of nebs, wean to PRN, symbicort with spacer ordered given h/o asthma, with short course of medrol   - Patient at near baseline O2 given she uses 4L NC at home     #JACQUES on CKD stage 3    ·  Plan: Cr steady at 2.3   avoid nephrotoxic meds, hold lisinopril for now  close f/u on Cr while on iv diuresis  nephro on board    #DM (diabetes mellitus).    pt's Blood gluocse 75 on arrival, will keep on less dose of lantus , admelog scale and adjust per BG response.  SSI    #Chronic Edema   Lymphedema noted in B/L LE's, chronic changes of Abdomen with weeping while lifting pannus   Wound care following   Skin maceration in multiple fold areas, Dressing over L. groin wound under pannus     #Wound of foot.   rt. foot wound above 5th toe , recently got skin laceration, will keep on doxycycline  X ray foot shows no signs of foreign body / OM, nonspecific edema of foot   Podiatry consulted, however primarily chronic changes of both feet     #Morbid Obesity   Patient > 580 lbs, greatly impacting potential health care, unable to use CT imaging, NM stress, V/Q scan    #Chronic pain  on oxy IR 60mg Q12  start dilaudid PRN for breakthrough pain    #Anxiety  likely in the setting of chronic pain and chronic pulmonary disease  start xanax 0.25mg Q12    Fall at home - Severely deconditioned - PT eval .     DVT prophylaxis: Heparin drip as patient may have underlying VTE, switch to DOAC on DC  Diet: DASH   Dispo: PendingPT eval and cardio recs.

## 2023-09-04 NOTE — PROGRESS NOTE ADULT - PROBLEM SELECTOR PLAN 4
D-dimer is greater than cutoff,  HX of DVT in the past  ?  continuation of heparin per discretion of medical team

## 2023-09-04 NOTE — PROGRESS NOTE ADULT - ASSESSMENT
67 y/o female with hx of HFpEF, on home O2 4L, pulmonary HTN, morbid obesity, asthma, CKD3, IDDM2, uterine cancer s/p MEGAN, DVT LLE (2004), chronic leg edema 2/2 venous stasis, spinal stenosis with chronic back pain who presents with SOB. Pt states that she has chronic difficulty breathing but it got even worse than normal over the past week. Pt ambulates with a walker. She fell and sustained right foot laceration a few days ago. Today she wasn't able to get out of bed due to dyspnea and pain and family called EMS. In the ED pt found with elevated D-dimer (407) and troponin  (0.09) and was started on Heparin drip. Cr 1.89, proBNP 1623. Pt follows with a cardiologist from [place]. Pt states that she's been having daily, intermittent chest pain for years without any recent change. She had an abnormal stress test in the past but no cardiac catheterization was performed

## 2023-09-05 LAB
ANION GAP SERPL CALC-SCNC: 11 MMOL/L — SIGNIFICANT CHANGE UP (ref 5–17)
APTT BLD: 51.9 SEC — HIGH (ref 24.5–35.6)
APTT BLD: 55.5 SEC — HIGH (ref 24.5–35.6)
APTT BLD: 87.9 SEC — HIGH (ref 24.5–35.6)
BUN SERPL-MCNC: 68.8 MG/DL — HIGH (ref 8–20)
CALCIUM SERPL-MCNC: 9.9 MG/DL — SIGNIFICANT CHANGE UP (ref 8.4–10.5)
CHLORIDE SERPL-SCNC: 90 MMOL/L — LOW (ref 96–108)
CO2 SERPL-SCNC: 35 MMOL/L — HIGH (ref 22–29)
CREAT SERPL-MCNC: 2.32 MG/DL — HIGH (ref 0.5–1.3)
EGFR: 22 ML/MIN/1.73M2 — LOW
GLUCOSE BLDC GLUCOMTR-MCNC: 285 MG/DL — HIGH (ref 70–99)
GLUCOSE BLDC GLUCOMTR-MCNC: 311 MG/DL — HIGH (ref 70–99)
GLUCOSE BLDC GLUCOMTR-MCNC: 334 MG/DL — HIGH (ref 70–99)
GLUCOSE BLDC GLUCOMTR-MCNC: 417 MG/DL — HIGH (ref 70–99)
GLUCOSE SERPL-MCNC: 323 MG/DL — HIGH (ref 70–99)
HCT VFR BLD CALC: 36.7 % — SIGNIFICANT CHANGE UP (ref 34.5–45)
HGB BLD-MCNC: 11.2 G/DL — LOW (ref 11.5–15.5)
MCHC RBC-ENTMCNC: 27.7 PG — SIGNIFICANT CHANGE UP (ref 27–34)
MCHC RBC-ENTMCNC: 30.5 GM/DL — LOW (ref 32–36)
MCV RBC AUTO: 90.6 FL — SIGNIFICANT CHANGE UP (ref 80–100)
PLATELET # BLD AUTO: 188 K/UL — SIGNIFICANT CHANGE UP (ref 150–400)
POTASSIUM SERPL-MCNC: 4.6 MMOL/L — SIGNIFICANT CHANGE UP (ref 3.5–5.3)
POTASSIUM SERPL-SCNC: 4.6 MMOL/L — SIGNIFICANT CHANGE UP (ref 3.5–5.3)
RBC # BLD: 4.05 M/UL — SIGNIFICANT CHANGE UP (ref 3.8–5.2)
RBC # FLD: 15.9 % — HIGH (ref 10.3–14.5)
SODIUM SERPL-SCNC: 136 MMOL/L — SIGNIFICANT CHANGE UP (ref 135–145)
WBC # BLD: 13.29 K/UL — HIGH (ref 3.8–10.5)
WBC # FLD AUTO: 13.29 K/UL — HIGH (ref 3.8–10.5)

## 2023-09-05 PROCEDURE — 76775 US EXAM ABDO BACK WALL LIM: CPT | Mod: 26

## 2023-09-05 PROCEDURE — 99233 SBSQ HOSP IP/OBS HIGH 50: CPT

## 2023-09-05 PROCEDURE — 99232 SBSQ HOSP IP/OBS MODERATE 35: CPT

## 2023-09-05 RX ORDER — DULOXETINE HYDROCHLORIDE 30 MG/1
30 CAPSULE, DELAYED RELEASE ORAL DAILY
Refills: 0 | Status: DISCONTINUED | OUTPATIENT
Start: 2023-09-05 | End: 2023-09-13

## 2023-09-05 RX ORDER — FUROSEMIDE 40 MG
40 TABLET ORAL DAILY
Refills: 0 | Status: DISCONTINUED | OUTPATIENT
Start: 2023-09-05 | End: 2023-09-13

## 2023-09-05 RX ORDER — INSULIN GLARGINE 100 [IU]/ML
20 INJECTION, SOLUTION SUBCUTANEOUS AT BEDTIME
Refills: 0 | Status: DISCONTINUED | OUTPATIENT
Start: 2023-09-05 | End: 2023-09-13

## 2023-09-05 RX ORDER — INSULIN GLARGINE 100 [IU]/ML
20 INJECTION, SOLUTION SUBCUTANEOUS EVERY MORNING
Refills: 0 | Status: DISCONTINUED | OUTPATIENT
Start: 2023-09-05 | End: 2023-09-13

## 2023-09-05 RX ADMIN — HEPARIN SODIUM 5000 UNIT(S): 5000 INJECTION INTRAVENOUS; SUBCUTANEOUS at 14:35

## 2023-09-05 RX ADMIN — POLYETHYLENE GLYCOL 3350 17 GRAM(S): 17 POWDER, FOR SOLUTION ORAL at 13:13

## 2023-09-05 RX ADMIN — HEPARIN SODIUM 1600 UNIT(S)/HR: 5000 INJECTION INTRAVENOUS; SUBCUTANEOUS at 22:27

## 2023-09-05 RX ADMIN — Medication 100 MILLIGRAM(S): at 17:09

## 2023-09-05 RX ADMIN — HYDROMORPHONE HYDROCHLORIDE 1 MILLIGRAM(S): 2 INJECTION INTRAMUSCULAR; INTRAVENOUS; SUBCUTANEOUS at 04:03

## 2023-09-05 RX ADMIN — Medication 10 MILLIGRAM(S): at 05:33

## 2023-09-05 RX ADMIN — Medication 1 DROP(S): at 05:29

## 2023-09-05 RX ADMIN — ATORVASTATIN CALCIUM 40 MILLIGRAM(S): 80 TABLET, FILM COATED ORAL at 22:37

## 2023-09-05 RX ADMIN — CHLORHEXIDINE GLUCONATE 1 APPLICATION(S): 213 SOLUTION TOPICAL at 05:33

## 2023-09-05 RX ADMIN — Medication 1 APPLICATION(S): at 13:14

## 2023-09-05 RX ADMIN — NYSTATIN CREAM 1 APPLICATION(S): 100000 CREAM TOPICAL at 17:10

## 2023-09-05 RX ADMIN — HEPARIN SODIUM 1300 UNIT(S)/HR: 5000 INJECTION INTRAVENOUS; SUBCUTANEOUS at 06:57

## 2023-09-05 RX ADMIN — LORATADINE 10 MILLIGRAM(S): 10 TABLET ORAL at 13:13

## 2023-09-05 RX ADMIN — KETOTIFEN FUMARATE 1 DROP(S): 0.34 SOLUTION OPHTHALMIC at 05:32

## 2023-09-05 RX ADMIN — HYDROMORPHONE HYDROCHLORIDE 1 MILLIGRAM(S): 2 INJECTION INTRAMUSCULAR; INTRAVENOUS; SUBCUTANEOUS at 06:05

## 2023-09-05 RX ADMIN — OXYCODONE HYDROCHLORIDE 60 MILLIGRAM(S): 5 TABLET ORAL at 17:09

## 2023-09-05 RX ADMIN — Medication 8: at 08:11

## 2023-09-05 RX ADMIN — HEPARIN SODIUM 1300 UNIT(S)/HR: 5000 INJECTION INTRAVENOUS; SUBCUTANEOUS at 07:15

## 2023-09-05 RX ADMIN — Medication 2: at 22:37

## 2023-09-05 RX ADMIN — HYDROMORPHONE HYDROCHLORIDE 1 MILLIGRAM(S): 2 INJECTION INTRAMUSCULAR; INTRAVENOUS; SUBCUTANEOUS at 14:55

## 2023-09-05 RX ADMIN — Medication 3 MILLILITER(S): at 08:59

## 2023-09-05 RX ADMIN — HYDROMORPHONE HYDROCHLORIDE 1 MILLIGRAM(S): 2 INJECTION INTRAMUSCULAR; INTRAVENOUS; SUBCUTANEOUS at 14:37

## 2023-09-05 RX ADMIN — INSULIN GLARGINE 20 UNIT(S): 100 INJECTION, SOLUTION SUBCUTANEOUS at 22:37

## 2023-09-05 RX ADMIN — LACTULOSE 20 GRAM(S): 10 SOLUTION ORAL at 17:58

## 2023-09-05 RX ADMIN — OXYCODONE HYDROCHLORIDE 60 MILLIGRAM(S): 5 TABLET ORAL at 05:33

## 2023-09-05 RX ADMIN — Medication 2000 UNIT(S): at 13:13

## 2023-09-05 RX ADMIN — Medication 12: at 12:31

## 2023-09-05 RX ADMIN — BUMETANIDE 2 MILLIGRAM(S): 0.25 INJECTION INTRAMUSCULAR; INTRAVENOUS at 05:32

## 2023-09-05 RX ADMIN — Medication 3 MILLILITER(S): at 14:55

## 2023-09-05 RX ADMIN — Medication 1 DROP(S): at 17:14

## 2023-09-05 RX ADMIN — Medication 3 MILLILITER(S): at 22:08

## 2023-09-05 RX ADMIN — INSULIN GLARGINE 20 UNIT(S): 100 INJECTION, SOLUTION SUBCUTANEOUS at 09:55

## 2023-09-05 RX ADMIN — LACTULOSE 20 GRAM(S): 10 SOLUTION ORAL at 05:32

## 2023-09-05 RX ADMIN — BUDESONIDE AND FORMOTEROL FUMARATE DIHYDRATE 2 PUFF(S): 160; 4.5 AEROSOL RESPIRATORY (INHALATION) at 09:00

## 2023-09-05 RX ADMIN — HEPARIN SODIUM 1600 UNIT(S)/HR: 5000 INJECTION INTRAVENOUS; SUBCUTANEOUS at 14:32

## 2023-09-05 RX ADMIN — Medication 81 MILLIGRAM(S): at 13:13

## 2023-09-05 RX ADMIN — Medication 100 MILLIGRAM(S): at 05:32

## 2023-09-05 RX ADMIN — Medication 3 MILLILITER(S): at 03:58

## 2023-09-05 RX ADMIN — HEPARIN SODIUM 1600 UNIT(S)/HR: 5000 INJECTION INTRAVENOUS; SUBCUTANEOUS at 23:24

## 2023-09-05 RX ADMIN — HYDROMORPHONE HYDROCHLORIDE 1 MILLIGRAM(S): 2 INJECTION INTRAMUSCULAR; INTRAVENOUS; SUBCUTANEOUS at 22:37

## 2023-09-05 RX ADMIN — KETOTIFEN FUMARATE 1 DROP(S): 0.34 SOLUTION OPHTHALMIC at 17:14

## 2023-09-05 RX ADMIN — NYSTATIN CREAM 1 APPLICATION(S): 100000 CREAM TOPICAL at 05:33

## 2023-09-05 RX ADMIN — BUDESONIDE AND FORMOTEROL FUMARATE DIHYDRATE 2 PUFF(S): 160; 4.5 AEROSOL RESPIRATORY (INHALATION) at 22:10

## 2023-09-05 RX ADMIN — HYDROMORPHONE HYDROCHLORIDE 1 MILLIGRAM(S): 2 INJECTION INTRAMUSCULAR; INTRAVENOUS; SUBCUTANEOUS at 23:16

## 2023-09-05 RX ADMIN — HEPARIN SODIUM 1300 UNIT(S)/HR: 5000 INJECTION INTRAVENOUS; SUBCUTANEOUS at 05:54

## 2023-09-05 RX ADMIN — Medication 8: at 17:08

## 2023-09-05 RX ADMIN — Medication 0.25 MILLIGRAM(S): at 00:20

## 2023-09-05 NOTE — PROGRESS NOTE ADULT - NS ATTEST RISK PROBLEM GEN_ALL_CORE FT
Acute on chronic respiratory failure
Acute on chronic respiratory failure
Acute on chronic hypoxic respiratory failure
IV Diuresis, needs blood thinners

## 2023-09-05 NOTE — PROGRESS NOTE ADULT - PROBLEM SELECTOR PLAN 1
- Patient's echo with EF 55-60%, grade Ii DD, moderately reduced RV function, mild mR, severe pulmonary HTN with PASP 70.   - Patient still with significant fluid overload.   - Continue bumex IV BID   - Goal 2 L output net negative daily.   - Pulmonology following as well, recs appreciated.   - Monitor on telemetry.    - Strict i/o and daily weights.   - Keep K > 4, Mg > 2.    - Monitor renal function with ongoing diuresis.
- Patient's echo with EF 55-60%, grade Ii DD, moderately reduced RV function, mild mR, severe pulmonary HTN with PASP 70.   - Patient still with volume overload   - Continue bumex IV BID, will need nephrology consult as JACQUES is worsening   - Goal 2 L output net negative daily.   - Pulmonology following as well, recs appreciated.   - Monitor on telemetry.    - Strict i/o and daily weights.   - Keep K > 4, Mg > 2.    - Monitor renal function with ongoing diuresis.
Creat stable but has increased from baseline of 1.8
- Patient's echo with EF 55-60%, grade Ii DD, moderately reduced RV function, mild mR, severe pulmonary HTN with PASP 70.   - Patient still with significant fluid overload.   - Transition to Bumex 2mg IV BID for diuresis.   - Goal 2 L daily.   - Pulmonology following as well, recs appreciated.   - Monitor on telemetry.    - Strict i/o and daily weights.   - Keep K > 4, Mg > 2.    - Monitor renal function with ongoing diuresis.
- Patient's echo with EF 55-60%, grade Ii DD, moderately reduced RV function, mild mR, severe pulmonary HTN with PASP 70.   - Patient still with volume overload   - Continue bumex IV BID, will need nephrology consult as JACQUES is worsening   - Goal 2 L output net negative daily.   - Pulmonology following as well, recs appreciated.   - Monitor on telemetry.    - Strict i/o and daily weights.   - Keep K > 4, Mg > 2.    - Monitor renal function with ongoing diuresis.

## 2023-09-05 NOTE — PROGRESS NOTE ADULT - PROBLEM SELECTOR PLAN 2
CXR mild chf bnp 1623  developing contracted alkalosis  Diuresing well would decrease Bumex to 2mg qd  Low na diet  Weight loss advised  PUL htn likely from ENRIQUE and asthma    GDMT  Diurectic  bumex 2 mg ivp  was on lasix 40mg at home  Beta blocker held b/p on low side  ACE/ARB/ARNI /MRA held due to renal funcion
- Troponin normalized at this time.   - Likely demand ischemia in the setting of acute decompensated HFpEF.   - Patient had dobutamine stress test 2020 with no ischemia.   - Echocardiogram with normal EF, no RWMA.   - Continue aspirin and lipitor.   - Morbid obesity precludes moving forward with NST or LHC as the weight limit for Nuclear and Cath lab tables is 500 lbs.

## 2023-09-05 NOTE — PROGRESS NOTE ADULT - PROBLEM SELECTOR PLAN 3
trop trending downward  patient has chronic chest pain that has not changed in quality  Patients weight precludes ischemic work up at this time
- Patient with worsening pulmonary HTN now severe with PASP 70.   - Unable to r/o PE with CTA or V/Q scan.   - No evidence of DVT on duplex.   - Patient with history of DVT and elevated D-Dimer, concern for PE.   - Heparin gtt running at this time.   - Echo with moderately reduced RV function.
- Patient with worsening pulmonary HTN now severe with PASP 70.   - Unable to r/o PE with CTA or V/Q scan.   - No evidence of DVT on duplex.   - Patient with history of DVT and elevated D-Dimer, concern for PE.   - Heparin gtt running at this time.   - Echo with moderately reduced RV function.
- Patient with worsening pulmonary HTN now severe with PASP 70.   - Unable to r/o PE with CTA or V/Q scan.   - No evidence of DVT on duplex.   - Patient with history of DVT and elevated D-Dimer, concern for PE.   - Heparin gtt running at this time.   - Echo with moderately reduced RV function.  - needs pulmonary HTN specialist consult as outpatient
- Patient with worsening pulmonary HTN now severe with PASP 70.   - Unable to r/o PE with CTA or V/Q scan.   - No evidence of DVT on duplex.   - Patient with history of DVT and elevated D-Dimer, concern for PE.   - Heparin gtt running at this time.   - Echo with moderately reduced RV function.

## 2023-09-05 NOTE — PROGRESS NOTE ADULT - PROBLEM SELECTOR PROBLEM 2
Obesity hypoventilation syndrome
Elevated troponin
Obesity hypoventilation syndrome
Acute on chronic diastolic congestive heart failure
Elevated troponin

## 2023-09-05 NOTE — PROGRESS NOTE ADULT - ASSESSMENT
67 y/o morbidly obese female with h/o asthma on home o2, HFp EF, spinal stenosis, dm, ckd, , dvt lt. leg in 2004, treated and resolved came in for increasing sob for past 1 week. pt. stated that she has baseline chronic sob but last week was more than her baseline and weakness. As per pt. she gets oob and uses walker. In the ER  first trop 0.09 , d dimer 407, due to pt's weight she exceeds the wt. limit for ct. Cardiology following, continue diuresis, Heparin drip though low suspicion for clot however continues for potential ACS, unable to stress test, unable to perform CTA / VQ scan, LHC / RHC due to weight, Pulm following, severe Pulm HTN, vague h/o Asthma, patient is at baseline oxygenation, however could benefit still from further diuresis.     #Acute on Chronic HFpEF   ·  ECHO performed with 55% EF, moderate reduced RV function, severe RVSP elevation   - [facility] following, continues on diuresis with Bumex IV Q12, will monitor urine output, if needed can be switched to bumex gtt, unable to perform LHC / Stress test due to weight, Stress ECHO could be done but is of limited utility in this case     #Elevated troponin.   first trop 0.09, no active cp, trended down to .06, asa, statin on board, hold b.mando has h/o asthma, follow cardiology team recommendations.  nonspecific T wave abnorms, continues on Heparin drip, however unable to perform Stress / LHC as above      #Pulmonary HTN   per ECHO RVSP severely elevated > 70, likely unable to obtain RHC given weight, possibly Type II/III   Switch bumex to lasix 40mg Q24  symbicort w/ spacer BID    #Obesity hypoventilation syndrome   Patient does not use CPAP/Bipap at home, claustrophobic and does not like full face mask    #Acute on Chronic respiratory failure with hypoxia  Multifactorial- restrictive lung disease 2/2 morbid obesity, HFpEF exacerbation / Severe pulm HTN  Pulm following, short course of nebs, wean to PRN, symbicort with spacer ordered given h/o asthma, with short course of medrol   - Patient at near baseline O2 given she uses 4L NC at home     #JACQUES on CKD stage 3    ·  Plan: Cr steady at 2.3, elevated to due to diuresis   avoid nephrotoxic meds, hold lisinopril for now  f/u renal fx  nephro on board    #DM (diabetes mellitus).   uncontrolled  SSI  increased lantus bid dosing    #Chronic Edema   Lymphedema noted in B/L LE's, chronic changes of Abdomen with weeping while lifting pannus   Wound care following   Skin maceration in multiple fold areas, Dressing over L. groin wound under pannus     #Wound of foot.   rt. foot wound above 5th toe , recently got skin laceration, will keep on doxycycline  X ray foot shows no signs of foreign body / OM, nonspecific edema of foot   Podiatry consulted, however primarily chronic changes of both feet     #Morbid Obesity   Patient > 580 lbs, greatly impacting potential health care, unable to use CT imaging, NM stress, V/Q scan    #Chronic pain  on oxy IR 60mg Q12  start dilaudid PRN for breakthrough pain    #Anxiety  likely in the setting of chronic pain and chronic pulmonary disease  c/w xanax 0.25mg Q12    Fall at home - Severely deconditioned - PT eval .     DVT prophylaxis: Heparin drip as patient may have underlying VTE, switch to DOAC on DC  Diet: DASH   Dispo: Recommending bariatric rehab as per PT however patient wants home PT. Pending cardio recs   67 y/o morbidly obese female with h/o asthma on home o2, HFp EF, spinal stenosis, dm, ckd, , dvt lt. leg in 2004, treated and resolved came in for increasing sob for past 1 week. pt. stated that she has baseline chronic sob but last week was more than her baseline and weakness. As per pt. she gets oob and uses walker. In the ER  first trop 0.09 , d dimer 407, due to pt's weight she exceeds the wt. limit for ct. Cardiology following, continue diuresis, Heparin drip though low suspicion for clot however continues for potential ACS, unable to stress test, unable to perform CTA / VQ scan, LHC / RHC due to weight, Pulm following, severe Pulm HTN, vague h/o Asthma, patient is at baseline oxygenation, however could benefit still from further diuresis.     #Acute on Chronic HFpEF   ·  ECHO performed with 55% EF, moderate reduced RV function, severe RVSP elevation   - [facility] following, continues on diuresis with Bumex IV Q12, will monitor urine output, if needed can be switched to bumex gtt, unable to perform LHC / Stress test due to weight, Stress ECHO could be done but is of limited utility in this case     #Elevated troponin.   first trop 0.09, no active cp, trended down to .06, asa, statin on board, hold b.mando has h/o asthma, follow cardiology team recommendations.  nonspecific T wave abnorms, continues on Heparin drip, however unable to perform Stress / LHC as above      #Pulmonary HTN   per ECHO RVSP severely elevated > 70, likely unable to obtain RHC given weight, possibly Type II/III   Switch bumex to lasix 40mg Q24  symbicort w/ spacer BID    #Obesity hypoventilation syndrome   Patient does not use CPAP/Bipap at home, claustrophobic and does not like full face mask    #Acute on Chronic respiratory failure with hypoxia  Multifactorial- restrictive lung disease 2/2 morbid obesity, HFpEF exacerbation / Severe pulm HTN  Pulm following, short course of nebs, wean to PRN, symbicort with spacer ordered given h/o asthma, with short course of medrol   - Patient at near baseline O2 given she uses 4L NC at home     #JACQUES on CKD stage 3    ·  Plan: Cr steady at 2.3, elevated to due to diuresis   avoid nephrotoxic meds, hold lisinopril for now  f/u renal fx  nephro on board    #DM (diabetes mellitus).   uncontrolled  SSI  increased lantus bid dosing    #Chronic Edema   Lymphedema noted in B/L LE's, chronic changes of Abdomen with weeping while lifting pannus   Wound care following   Skin maceration in multiple fold areas, Dressing over L. groin wound under pannus     #Wound of foot.   rt. foot wound above 5th toe , recently got skin laceration, will keep on doxycycline  X ray foot shows no signs of foreign body / OM, nonspecific edema of foot   Podiatry consulted, however primarily chronic changes of both feet     #Morbid Obesity   Patient > 580 lbs, greatly impacting potential health care, unable to use CT imaging, NM stress, V/Q scan  Bariatric consult outpatient    #Chronic pain  on oxy IR 60mg Q12  dilaudid PRN for breakthrough pain, will consult Pain mgmt to convert to oral regimen for long term    #Anxiety  likely in the setting of chronic pain and chronic pulmonary disease  c/w xanax 0.25mg Q12  start cymbalta 30mg Q24    Fall at home - Severely deconditioned - PT eval .     DVT prophylaxis: Heparin drip as patient may have underlying VTE, switch to DOAC on DC  Diet: DASH   Dispo: Recommending bariatric rehab as per PT however patient wants home PT. Discussed w/ son at length over the phone (Abimael - [phone]), explained that his mother would benefit best from a rehab, Abimael also agreed stating that he cannot take care of her at home. Will discuss w/ patient and decide on dispo within 24-48 hours.

## 2023-09-05 NOTE — PROGRESS NOTE ADULT - PROBLEM SELECTOR PROBLEM 3
Severe pulmonary hypertension
Severe pulmonary hypertension
Dyspnea
Elevated troponin
Severe pulmonary hypertension
Dyspnea
Severe pulmonary hypertension

## 2023-09-05 NOTE — PROGRESS NOTE ADULT - ASSESSMENT
This is 69 y/o female with hx of HFpEF, on home O2 4L, pulmonary HTN, morbid obesity, asthma, CKD3, IDDM2, uterine cancer s/p MEGAN, DVT LLE (2004), chronic leg edema 2/2 venous stasis, spinal stenosis with chronic back pain who presents with SOB. Pt states that she has chronic difficulty breathing but it got even worse than normal over the past week. Pt ambulates with a walker. She fell and sustained right foot laceration a few days ago. Today she wasn't able to get out of bed due to dyspnea and pain and family called EMS. In the ED pt found with elevated D-dimer (407) and troponin  (0.09) and was started on Heparin drip. Cr 1.89, proBNP 1623. Pt follows with a cardiologist from [place]. Pt states that she's been having daily, intermittent chest pain for years without any recent change. She had an abnormal stress test in the past but no cardiac catheterization was performed. Echocardiogram in 10/2022 showed EF 50-55% and no significant valvular abnormalities.

## 2023-09-05 NOTE — PROGRESS NOTE ADULT - PROBLEM SELECTOR PROBLEM 1
Acute on chronic respiratory failure with hypoxemia
Acute heart failure with preserved ejection fraction (HFpEF)
Stage 3 chronic kidney disease
Acute on chronic respiratory failure with hypoxemia

## 2023-09-06 LAB
ANION GAP SERPL CALC-SCNC: 10 MMOL/L — SIGNIFICANT CHANGE UP (ref 5–17)
APTT BLD: 75.2 SEC — HIGH (ref 24.5–35.6)
BUN SERPL-MCNC: 69.4 MG/DL — HIGH (ref 8–20)
CALCIUM SERPL-MCNC: 10 MG/DL — SIGNIFICANT CHANGE UP (ref 8.4–10.5)
CHLORIDE SERPL-SCNC: 91 MMOL/L — LOW (ref 96–108)
CO2 SERPL-SCNC: 35 MMOL/L — HIGH (ref 22–29)
CREAT SERPL-MCNC: 2.18 MG/DL — HIGH (ref 0.5–1.3)
EGFR: 24 ML/MIN/1.73M2 — LOW
GLUCOSE BLDC GLUCOMTR-MCNC: 258 MG/DL — HIGH (ref 70–99)
GLUCOSE BLDC GLUCOMTR-MCNC: 286 MG/DL — HIGH (ref 70–99)
GLUCOSE BLDC GLUCOMTR-MCNC: 365 MG/DL — HIGH (ref 70–99)
GLUCOSE BLDC GLUCOMTR-MCNC: 372 MG/DL — HIGH (ref 70–99)
GLUCOSE SERPL-MCNC: 267 MG/DL — HIGH (ref 70–99)
HCT VFR BLD CALC: 36.7 % — SIGNIFICANT CHANGE UP (ref 34.5–45)
HGB BLD-MCNC: 11.4 G/DL — LOW (ref 11.5–15.5)
MCHC RBC-ENTMCNC: 28.1 PG — SIGNIFICANT CHANGE UP (ref 27–34)
MCHC RBC-ENTMCNC: 31.1 GM/DL — LOW (ref 32–36)
MCV RBC AUTO: 90.4 FL — SIGNIFICANT CHANGE UP (ref 80–100)
PLATELET # BLD AUTO: 180 K/UL — SIGNIFICANT CHANGE UP (ref 150–400)
POTASSIUM SERPL-MCNC: 4.9 MMOL/L — SIGNIFICANT CHANGE UP (ref 3.5–5.3)
POTASSIUM SERPL-SCNC: 4.9 MMOL/L — SIGNIFICANT CHANGE UP (ref 3.5–5.3)
RBC # BLD: 4.06 M/UL — SIGNIFICANT CHANGE UP (ref 3.8–5.2)
RBC # FLD: 16 % — HIGH (ref 10.3–14.5)
SODIUM SERPL-SCNC: 136 MMOL/L — SIGNIFICANT CHANGE UP (ref 135–145)
WBC # BLD: 14.06 K/UL — HIGH (ref 3.8–10.5)
WBC # FLD AUTO: 14.06 K/UL — HIGH (ref 3.8–10.5)

## 2023-09-06 PROCEDURE — 99222 1ST HOSP IP/OBS MODERATE 55: CPT

## 2023-09-06 PROCEDURE — 99232 SBSQ HOSP IP/OBS MODERATE 35: CPT

## 2023-09-06 RX ORDER — CYCLOBENZAPRINE HYDROCHLORIDE 10 MG/1
5 TABLET, FILM COATED ORAL THREE TIMES A DAY
Refills: 0 | Status: DISCONTINUED | OUTPATIENT
Start: 2023-09-06 | End: 2023-09-13

## 2023-09-06 RX ORDER — OXYCODONE HYDROCHLORIDE 5 MG/1
5 TABLET ORAL EVERY 8 HOURS
Refills: 0 | Status: DISCONTINUED | OUTPATIENT
Start: 2023-09-06 | End: 2023-09-13

## 2023-09-06 RX ORDER — APIXABAN 2.5 MG/1
5 TABLET, FILM COATED ORAL
Refills: 0 | Status: DISCONTINUED | OUTPATIENT
Start: 2023-09-06 | End: 2023-09-06

## 2023-09-06 RX ADMIN — KETOTIFEN FUMARATE 1 DROP(S): 0.34 SOLUTION OPHTHALMIC at 17:38

## 2023-09-06 RX ADMIN — INSULIN GLARGINE 20 UNIT(S): 100 INJECTION, SOLUTION SUBCUTANEOUS at 07:49

## 2023-09-06 RX ADMIN — BUDESONIDE AND FORMOTEROL FUMARATE DIHYDRATE 2 PUFF(S): 160; 4.5 AEROSOL RESPIRATORY (INHALATION) at 22:11

## 2023-09-06 RX ADMIN — Medication 40 MILLIGRAM(S): at 05:39

## 2023-09-06 RX ADMIN — HEPARIN SODIUM 1600 UNIT(S)/HR: 5000 INJECTION INTRAVENOUS; SUBCUTANEOUS at 07:24

## 2023-09-06 RX ADMIN — OXYCODONE HYDROCHLORIDE 60 MILLIGRAM(S): 5 TABLET ORAL at 18:38

## 2023-09-06 RX ADMIN — OXYCODONE HYDROCHLORIDE 60 MILLIGRAM(S): 5 TABLET ORAL at 05:38

## 2023-09-06 RX ADMIN — BUDESONIDE AND FORMOTEROL FUMARATE DIHYDRATE 2 PUFF(S): 160; 4.5 AEROSOL RESPIRATORY (INHALATION) at 09:59

## 2023-09-06 RX ADMIN — NYSTATIN CREAM 1 APPLICATION(S): 100000 CREAM TOPICAL at 05:40

## 2023-09-06 RX ADMIN — OXYCODONE HYDROCHLORIDE 60 MILLIGRAM(S): 5 TABLET ORAL at 17:38

## 2023-09-06 RX ADMIN — OXYCODONE HYDROCHLORIDE 60 MILLIGRAM(S): 5 TABLET ORAL at 07:40

## 2023-09-06 RX ADMIN — NYSTATIN CREAM 1 APPLICATION(S): 100000 CREAM TOPICAL at 17:38

## 2023-09-06 RX ADMIN — Medication 6: at 07:46

## 2023-09-06 RX ADMIN — Medication 81 MILLIGRAM(S): at 12:21

## 2023-09-06 RX ADMIN — Medication 100 MILLIGRAM(S): at 05:39

## 2023-09-06 RX ADMIN — LACTULOSE 20 GRAM(S): 10 SOLUTION ORAL at 18:29

## 2023-09-06 RX ADMIN — Medication 3 MILLILITER(S): at 09:59

## 2023-09-06 RX ADMIN — KETOTIFEN FUMARATE 1 DROP(S): 0.34 SOLUTION OPHTHALMIC at 05:39

## 2023-09-06 RX ADMIN — Medication 10: at 16:58

## 2023-09-06 RX ADMIN — Medication 3 MILLILITER(S): at 14:33

## 2023-09-06 RX ADMIN — Medication 2000 UNIT(S): at 12:21

## 2023-09-06 RX ADMIN — HEPARIN SODIUM 1600 UNIT(S)/HR: 5000 INJECTION INTRAVENOUS; SUBCUTANEOUS at 05:36

## 2023-09-06 RX ADMIN — INSULIN GLARGINE 20 UNIT(S): 100 INJECTION, SOLUTION SUBCUTANEOUS at 21:46

## 2023-09-06 RX ADMIN — Medication 3 MILLILITER(S): at 22:11

## 2023-09-06 RX ADMIN — Medication 10: at 12:16

## 2023-09-06 RX ADMIN — LORATADINE 10 MILLIGRAM(S): 10 TABLET ORAL at 12:21

## 2023-09-06 RX ADMIN — Medication 10 MILLIGRAM(S): at 05:38

## 2023-09-06 RX ADMIN — Medication 1 APPLICATION(S): at 18:29

## 2023-09-06 RX ADMIN — OXYCODONE HYDROCHLORIDE 5 MILLIGRAM(S): 5 TABLET ORAL at 21:53

## 2023-09-06 RX ADMIN — POLYETHYLENE GLYCOL 3350 17 GRAM(S): 17 POWDER, FOR SOLUTION ORAL at 12:23

## 2023-09-06 RX ADMIN — DULOXETINE HYDROCHLORIDE 30 MILLIGRAM(S): 30 CAPSULE, DELAYED RELEASE ORAL at 12:21

## 2023-09-06 RX ADMIN — Medication 1 DROP(S): at 17:39

## 2023-09-06 RX ADMIN — CYCLOBENZAPRINE HYDROCHLORIDE 5 MILLIGRAM(S): 10 TABLET, FILM COATED ORAL at 14:40

## 2023-09-06 RX ADMIN — Medication 2: at 21:46

## 2023-09-06 RX ADMIN — CHLORHEXIDINE GLUCONATE 1 APPLICATION(S): 213 SOLUTION TOPICAL at 05:40

## 2023-09-06 RX ADMIN — ATORVASTATIN CALCIUM 40 MILLIGRAM(S): 80 TABLET, FILM COATED ORAL at 22:16

## 2023-09-06 RX ADMIN — Medication 1 DROP(S): at 05:39

## 2023-09-06 NOTE — PROGRESS NOTE ADULT - ASSESSMENT
67 y/o morbidly obese female with h/o asthma on home o2, HFp EF, spinal stenosis, dm, ckd, , dvt lt. leg in 2004, treated and resolved came in for increasing sob for past 1 week. pt. stated that she has baseline chronic sob but last week was more than her baseline and weakness. As per pt. she gets oob and uses walker. In the ER  first trop 0.09 , d dimer 407, due to pt's weight she exceeds the wt. limit for ct. Cardiology following, continue diuresis, Heparin drip though low suspicion for clot however continues for potential ACS, unable to stress test, unable to perform CTA / VQ scan, LHC / RHC due to weight, Pulm following, severe Pulm HTN, vague h/o Asthma, patient is at baseline oxygenation, however could benefit still from further diuresis.     #Acute on Chronic HFpEF   ·  ECHO performed with 55% EF, moderate reduced RV function, severe RVSP elevation   - cardiology signed off  - c/w daily lasix   - euvolemic    #Elevated troponin.   unable to obtain Ischemic workup due to body habitus    #Pulmonary HTN   per ECHO RVSP severely elevated > 70, likely unable to obtain RHC given weight, possibly Type II/III   euvolemic  c/w daily lasix  c/w oxygen long term  symbicort w/ spacer BID    #Obesity hypoventilation syndrome   declining CPAP at this time    #Acute on Chronic respiratory failure with hypoxia  Multifactorial- restrictive lung disease 2/2 morbid obesity, HFpEF exacerbation / Severe pulm HTN  Pulm following, short course of nebs, wean to PRN, symbicort with spacer ordered given h/o asthma, with short course of medrol   - Patient at near baseline O2 given she uses 4L NC at home     #JACQUES on CKD stage 3    - cr 2.1, improving   avoid nephrotoxic meds, hold lisinopril for now  f/u renal fx  nephro on board    #DM (diabetes mellitus).   uncontrolled  SSI  c/w Lantus BID    #Chronic Edema   Lymphedema noted in B/L LE's, chronic changes of Abdomen with weeping while lifting pannus   Wound care following   Skin maceration in multiple fold areas, Dressing over L. groin wound under pannus     #Wound of foot.   rt. foot wound above 5th toe , recently got skin laceration, will keep on doxycycline  X ray foot shows no signs of foreign body / OM, nonspecific edema of foot   Podiatry consulted, however primarily chronic changes of both feet     #Morbid Obesity   Patient > 580 lbs, greatly impacting potential health care, unable to use CT imaging, NM stress, V/Q scan  Bariatric consult outpatient    #Chronic pain  on oxy IR 60mg Q12  pain mgmt on board for long term PO mgmt    #Anxiety  likely in the setting of chronic pain and chronic pulmonary disease  c/w xanax 0.25mg Q12  c/w cymbalta 30mg Q24    Fall at home - Severely deconditioned - PT eval .     DVT prophylaxis: Heparin drip as patient may have underlying VTE, switch to DOAC on DC  Diet: DASH   Dispo: Patient agreeable to [facility], updated son over the phone

## 2023-09-06 NOTE — CONSULT NOTE ADULT - CONSULT REQUESTED DATE/TIME
03-Sep-2023 16:02
06-Sep-2023 13:21
31-Aug-2023 16:28
06-Sep-2023 11:43
30-Aug-2023 20:00
31-Aug-2023 15:01

## 2023-09-06 NOTE — CONSULT NOTE ADULT - ASSESSMENT
68 year old female with multiple comorbidities, and morbid obesity, unable to calculate BMI, currently admitted for acute coronary syndrome rule out in addition with severe pulmonary HTN with right sided heart strain.  Patient is a high risk for bariatric surgery, she would need extensive preop workup and clearance.  Can follow up as an outpatient once discharged.    Thank you for the consult.

## 2023-09-06 NOTE — CONSULT NOTE ADULT - ASSESSMENT
68F  h/o asthma on home o2, HFp EF, spinal stenosis, dm, ckd, , dvt lt. leg in 2004, treated and resolved came in for increasing sob for past 1 week.  on chronic opioid therapy - oxycontin 60 bid  received dilaudid ivp 1mg x2 in past 24 hrs     68F  h/o asthma on home o2, HFp EF, spinal stenosis, dm, ckd, , dvt lt. leg in 2004, treated and resolved came in for increasing sob for past 1 week.  on chronic opioid therapy - oxycontin 60 bid  received dilaudid ivp 1mg x2 in past 24 hrs      Med RECS:  DC dilaudid ivp  start oxy ir 5mg q8h prn sev pain  start flexeril 5mg tid prn spasm

## 2023-09-06 NOTE — CONSULT NOTE ADULT - SUBJECTIVE AND OBJECTIVE BOX
HPI:  68 year old female with PMH of asthma, severe pulmonary HTN on home oxygen, CHF with EF 50%, DM, CKD stage 3, LLE DVT (2004), spinal stenosis, admitted to the medical serivce after presenting to the ED with SOB and a fall treated and resolved came in for increasing sob for past 1 week. pt. stated that she has baseline chronic sob but last week was more than her baseline. As per pt. she gets oob and uses walker. no cp. no abd. pain . no n/v/d. no fever. pt. stated that she recently got a  small cut on her rt. foot  while getting OOB, podiatrist came to the house and glu was applied to the cut and her doctor put her on augmentin for 10 days which she used for 3 days but today came to the ER. no other complaints reported.   In the ER  first trop 0.09 , d dimer 407, due to pt's weight she exceeds the wt. limit for ct chest so ER physician started pt. on iv heparin. Lower ext. doppler has been ordered by the ER physician as well.  (30 Aug 2023 18:41)      PAST MEDICAL & SURGICAL HISTORY:  Diabetes Mellitus Type II      HTN (Hypertension)      Endometrial Hyperplasia      Cervical Stenosis of Spine      Spinal Stenosis, Lumbar      Deep Vein Thrombosis (DVT)  Left leg, 2004, treated and resolved      Dyslipidemia      Cataract      Morbid Obesity      Vitamin D deficiency      Insomnia      CKD (chronic kidney disease)  ~ III      Congestive heart failure  ~ HFpEF      Uterine cancer      Asthma      On home oxygen therapy      Gait difficulty  ~ u ses walker      Cataract extracted with lens implant 1998  Right      C Section 1994      Cholecystectomy/appendectomy @ age 25      D&C x2 1980's      D&C 2008  hysteroscopy, endometrial hyperplasia, 2009      Cervical Spinal Stenosis surgery x2 (01/2002, 7/2002)      Tonsillectomy as a child      Endometrial biopsy 12/02/09      H/O laser iridotomy  left eye, 2016      H/O colonoscopy  1998      S/P total abdominal hysterectomy and bilateral salpingo-oophorectomy      S/P appendectomy      S/P cholecystectomy          REVIEW OF SYSTEMS      General:	    Skin/Breast:  	  Ophthalmologic:  	  ENMT:	    Respiratory and Thorax:  	  Cardiovascular:	    Gastrointestinal:	    Genitourinary:	    Musculoskeletal:	    Neurological:	    Psychiatric:	    Hematology/Lymphatics:	    Endocrine:	    Allergic/Immunologic:	    MEDICATIONS  (STANDING):  albuterol/ipratropium for Nebulization 3 milliLiter(s) Nebulizer every 6 hours  artificial  tears Solution 1 Drop(s) Both EYES two times a day  aspirin enteric coated 81 milliGRAM(s) Oral daily  atorvastatin 40 milliGRAM(s) Oral at bedtime  budesonide 160 MICROgram(s)/formoterol 4.5 MICROgram(s) Inhaler 2 Puff(s) Inhalation two times a day  cadexomer iodine 0.9% Gel 1 Application(s) Topical daily  chlorhexidine 2% Cloths 1 Application(s) Topical <User Schedule>  cholecalciferol 2000 Unit(s) Oral daily  dextrose 5%. 1000 milliLiter(s) (50 mL/Hr) IV Continuous <Continuous>  dextrose 5%. 1000 milliLiter(s) (100 mL/Hr) IV Continuous <Continuous>  dextrose 50% Injectable 25 Gram(s) IV Push once  dextrose 50% Injectable 12.5 Gram(s) IV Push once  dextrose 50% Injectable 25 Gram(s) IV Push once  DULoxetine 30 milliGRAM(s) Oral daily  furosemide    Tablet 40 milliGRAM(s) Oral daily  glucagon  Injectable 1 milliGRAM(s) IntraMuscular once  insulin glargine Injectable (LANTUS) 20 Unit(s) SubCutaneous every morning  insulin glargine Injectable (LANTUS) 20 Unit(s) SubCutaneous at bedtime  insulin lispro (ADMELOG) corrective regimen sliding scale   SubCutaneous at bedtime  insulin lispro (ADMELOG) corrective regimen sliding scale   SubCutaneous three times a day before meals  ketotifen 0.025% Ophthalmic Solution 1 Drop(s) Both EYES two times a day  loratadine 10 milliGRAM(s) Oral daily  nystatin Powder 1 Application(s) Topical every 12 hours  oxyCODONE  ER Tablet 60 milliGRAM(s) Oral every 12 hours  polyethylene glycol 3350 17 Gram(s) Oral daily    MEDICATIONS  (PRN):  albuterol    0.083% 2.5 milliGRAM(s) Nebulizer every 4 hours PRN Shortness of Breath and/or Wheezing  ALPRAZolam 0.25 milliGRAM(s) Oral every 12 hours PRN Anxiety  cyclobenzaprine 5 milliGRAM(s) Oral three times a day PRN Muscle Spasm  dextrose Oral Gel 15 Gram(s) Oral once PRN Blood Glucose LESS THAN 70 milliGRAM(s)/deciliter  lactulose Syrup 20 Gram(s) Oral every 8 hours PRN Constipation  oxyCODONE    IR 5 milliGRAM(s) Oral every 8 hours PRN Severe Pain (7 - 10)      Allergies    adhesives (Rash)  latex (Rash)  wool- rash, itch (Other)  Bactrim (Flushing)    Intolerances        SOCIAL HISTORY:    FAMILY HISTORY:  Family history of pancreatic cancer    Family history of bladder cancer    Family history of lung cancer (Grandparent)        Vital Signs Last 24 Hrs  T(C): 36.8 (06 Sep 2023 04:45), Max: 37 (05 Sep 2023 22:32)  T(F): 98.3 (06 Sep 2023 04:45), Max: 98.6 (05 Sep 2023 22:32)  HR: 77 (06 Sep 2023 10:03) (67 - 83)  BP: 138/63 (06 Sep 2023 04:45) (118/56 - 138/63)  BP(mean): --  RR: 18 (06 Sep 2023 04:45) (16 - 18)  SpO2: 93% (06 Sep 2023 10:03) (91% - 93%)    Parameters below as of 06 Sep 2023 10:03  Patient On (Oxygen Delivery Method): nasal cannula w/ humidification        PHYSICAL EXAM:      Constitutional:    Eyes:    ENMT:    Neck:    Breasts:    Back:    Respiratory:    Cardiovascular:    Gastrointestinal:    Genitourinary:    Rectal:    Extremities:    Vascular:    Neurological:    Skin:    Lymph Nodes:    Musculoskeletal:    Psychiatric:        LABS:                        11.4   14.06 )-----------( 180      ( 06 Sep 2023 04:25 )             36.7     09-06    136  |  91<L>  |  69.4<H>  ----------------------------<  267<H>  4.9   |  35.0<H>  |  2.18<H>    Ca    10.0      06 Sep 2023 04:25      PTT - ( 06 Sep 2023 04:25 )  PTT:75.2 sec  Urinalysis Basic - ( 06 Sep 2023 04:25 )    Color: x / Appearance: x / SG: x / pH: x  Gluc: 267 mg/dL / Ketone: x  / Bili: x / Urobili: x   Blood: x / Protein: x / Nitrite: x   Leuk Esterase: x / RBC: x / WBC x   Sq Epi: x / Non Sq Epi: x / Bacteria: x        RADIOLOGY & ADDITIONAL STUDIES: HPI:  68 year old female with PMH of asthma, severe pulmonary HTN on home oxygen, CHF with EF 50%, DM, CKD stage 3, LLE DVT (2004), spinal stenosis, admitted to the medical service after presenting to the ED with SOB and a fall treated.  She has been unable to ambulate much, she normally uses a walker and can only do 2 steps with the walker.  Attempted diets but was not very successful  Never had an EGD  Had a colonoscopy about 6 years ago, was told she needed to follow up yearly after that but was unable to.  Patient states she never saw a bariatric surgeon in the past, however was told by different cardiologist on different occasions that she was a high risk fo anesthesia or surgery.    Currently being worked up for ACS.      PAST MEDICAL & SURGICAL HISTORY:  Diabetes Mellitus Type II      HTN (Hypertension)      Endometrial Hyperplasia      Cervical Stenosis of Spine      Spinal Stenosis, Lumbar      Deep Vein Thrombosis (DVT)  Left leg, 2004, treated and resolved      Dyslipidemia      Cataract      Morbid Obesity      Vitamin D deficiency      Insomnia      CKD (chronic kidney disease)  ~ III      Congestive heart failure  ~ HFpEF      Uterine cancer      Asthma      On home oxygen therapy      Gait difficulty  ~ u ses walker      Cataract extracted with lens implant 1998  Right      C Section 1994      Cholecystectomy/appendectomy @ age 25      D&C x2 1980's      D&C 2008  hysteroscopy, endometrial hyperplasia, 2009      Cervical Spinal Stenosis surgery x2 (01/2002, 7/2002)      Tonsillectomy as a child      Endometrial biopsy 12/02/09      H/O laser iridotomy  left eye, 2016      H/O colonoscopy  1998      S/P total abdominal hysterectomy and bilateral salpingo-oophorectomy      S/P appendectomy      S/P cholecystectomy          REVIEW OF SYSTEMS      General:	    Skin/Breast:  	  Ophthalmologic:  	  ENMT:	    Respiratory and Thorax:  	  Cardiovascular:	    Gastrointestinal:	    Genitourinary:	    Musculoskeletal:	    Neurological:	    Psychiatric:	    Hematology/Lymphatics:	    Endocrine:	    Allergic/Immunologic:	    MEDICATIONS  (STANDING):  albuterol/ipratropium for Nebulization 3 milliLiter(s) Nebulizer every 6 hours  artificial  tears Solution 1 Drop(s) Both EYES two times a day  aspirin enteric coated 81 milliGRAM(s) Oral daily  atorvastatin 40 milliGRAM(s) Oral at bedtime  budesonide 160 MICROgram(s)/formoterol 4.5 MICROgram(s) Inhaler 2 Puff(s) Inhalation two times a day  cadexomer iodine 0.9% Gel 1 Application(s) Topical daily  chlorhexidine 2% Cloths 1 Application(s) Topical <User Schedule>  cholecalciferol 2000 Unit(s) Oral daily  dextrose 5%. 1000 milliLiter(s) (50 mL/Hr) IV Continuous <Continuous>  dextrose 5%. 1000 milliLiter(s) (100 mL/Hr) IV Continuous <Continuous>  dextrose 50% Injectable 25 Gram(s) IV Push once  dextrose 50% Injectable 12.5 Gram(s) IV Push once  dextrose 50% Injectable 25 Gram(s) IV Push once  DULoxetine 30 milliGRAM(s) Oral daily  furosemide    Tablet 40 milliGRAM(s) Oral daily  glucagon  Injectable 1 milliGRAM(s) IntraMuscular once  insulin glargine Injectable (LANTUS) 20 Unit(s) SubCutaneous every morning  insulin glargine Injectable (LANTUS) 20 Unit(s) SubCutaneous at bedtime  insulin lispro (ADMELOG) corrective regimen sliding scale   SubCutaneous at bedtime  insulin lispro (ADMELOG) corrective regimen sliding scale   SubCutaneous three times a day before meals  ketotifen 0.025% Ophthalmic Solution 1 Drop(s) Both EYES two times a day  loratadine 10 milliGRAM(s) Oral daily  nystatin Powder 1 Application(s) Topical every 12 hours  oxyCODONE  ER Tablet 60 milliGRAM(s) Oral every 12 hours  polyethylene glycol 3350 17 Gram(s) Oral daily    MEDICATIONS  (PRN):  albuterol    0.083% 2.5 milliGRAM(s) Nebulizer every 4 hours PRN Shortness of Breath and/or Wheezing  ALPRAZolam 0.25 milliGRAM(s) Oral every 12 hours PRN Anxiety  cyclobenzaprine 5 milliGRAM(s) Oral three times a day PRN Muscle Spasm  dextrose Oral Gel 15 Gram(s) Oral once PRN Blood Glucose LESS THAN 70 milliGRAM(s)/deciliter  lactulose Syrup 20 Gram(s) Oral every 8 hours PRN Constipation  oxyCODONE    IR 5 milliGRAM(s) Oral every 8 hours PRN Severe Pain (7 - 10)      Allergies    adhesives (Rash)  latex (Rash)  wool- rash, itch (Other)  Bactrim (Flushing)    Intolerances        SOCIAL HISTORY:    FAMILY HISTORY:  Family history of pancreatic cancer    Family history of bladder cancer    Family history of lung cancer (Grandparent)        Vital Signs Last 24 Hrs  T(C): 36.8 (06 Sep 2023 04:45), Max: 37 (05 Sep 2023 22:32)  T(F): 98.3 (06 Sep 2023 04:45), Max: 98.6 (05 Sep 2023 22:32)  HR: 77 (06 Sep 2023 10:03) (67 - 83)  BP: 138/63 (06 Sep 2023 04:45) (118/56 - 138/63)  BP(mean): --  RR: 18 (06 Sep 2023 04:45) (16 - 18)  SpO2: 93% (06 Sep 2023 10:03) (91% - 93%)    Parameters below as of 06 Sep 2023 10:03  Patient On (Oxygen Delivery Method): nasal cannula w/ humidification        PHYSICAL EXAM:      Constitutional:    Eyes:    ENMT:    Neck:    Breasts:    Back:    Respiratory:    Cardiovascular:    Gastrointestinal:    Genitourinary:    Rectal:    Extremities:    Vascular:    Neurological:    Skin:    Lymph Nodes:    Musculoskeletal:    Psychiatric:        LABS:                        11.4   14.06 )-----------( 180      ( 06 Sep 2023 04:25 )             36.7     09-06    136  |  91<L>  |  69.4<H>  ----------------------------<  267<H>  4.9   |  35.0<H>  |  2.18<H>    Ca    10.0      06 Sep 2023 04:25      PTT - ( 06 Sep 2023 04:25 )  PTT:75.2 sec  Urinalysis Basic - ( 06 Sep 2023 04:25 )    Color: x / Appearance: x / SG: x / pH: x  Gluc: 267 mg/dL / Ketone: x  / Bili: x / Urobili: x   Blood: x / Protein: x / Nitrite: x   Leuk Esterase: x / RBC: x / WBC x   Sq Epi: x / Non Sq Epi: x / Bacteria: x        RADIOLOGY & ADDITIONAL STUDIES:

## 2023-09-06 NOTE — CONSULT NOTE ADULT - REASON FOR ADMISSION
dyspnea, elevated troponin

## 2023-09-06 NOTE — CONSULT NOTE ADULT - SUBJECTIVE AND OBJECTIVE BOX
Patient is a 68y old  Female who presents with a chief complaint of dyspnea, elevated troponin (05 Sep 2023 12:30)      HPI:  69 y/o morbidly obese female with h/o asthma on home o2, HFp EF, spinal stenosis, dm, ckd, , dvt lt. leg in 2004, treated and resolved came in for increasing sob for past 1 week. pt. stated that she has baseline chronic sob but last week was more than her baseline. As per pt. she gets oob and uses walker. no cp. no abd. pain . no n/v/d. no fever. pt. stated that she recently got a  small cut on her rt. foot  while getting OOB, podiatrist came to the house and glu was applied to the cut and her doctor put her on augmentin for 10 days which she used for 3 days but today came to the ER. no other complaints reported.   In the ER  first trop 0.09 , d dimer 407, due to pt's weight she exceeds the wt. limit for ct chest so ER physician started pt. on iv heparin. Lower ext. doppler has been ordered by the ER physician as well.  (30 Aug 2023 18:41)            Analgesic Dosing for past 24 hours reviewed as below:    HYDROmorphone  Injectable   1 milliGRAM(s) IV Push (09-05-23 @ 22:37)   1 milliGRAM(s) IV Push (09-05-23 @ 14:37)    oxyCODONE  ER Tablet   60 milliGRAM(s) Oral (09-06-23 @ 05:38)   60 milliGRAM(s) Oral (09-05-23 @ 17:09)          T(C): 36.8 (09-06-23 @ 04:45), Max: 37 (09-05-23 @ 22:32)  HR: 77 (09-06-23 @ 10:03) (67 - 83)  BP: 138/63 (09-06-23 @ 04:45) (118/56 - 138/63)  RR: 18 (09-06-23 @ 04:45) (16 - 18)  SpO2: 93% (09-06-23 @ 10:03) (91% - 93%)      09-05-23 @ 07:01  -  09-06-23 @ 07:00  --------------------------------------------------------  IN: 174 mL / OUT: 3700 mL / NET: -3526 mL        albuterol    0.083% 2.5 milliGRAM(s) Nebulizer every 4 hours PRN  albuterol/ipratropium for Nebulization 3 milliLiter(s) Nebulizer every 6 hours  ALPRAZolam 0.25 milliGRAM(s) Oral every 12 hours PRN  artificial  tears Solution 1 Drop(s) Both EYES two times a day  aspirin enteric coated 81 milliGRAM(s) Oral daily  atorvastatin 40 milliGRAM(s) Oral at bedtime  budesonide 160 MICROgram(s)/formoterol 4.5 MICROgram(s) Inhaler 2 Puff(s) Inhalation two times a day  cadexomer iodine 0.9% Gel 1 Application(s) Topical daily  chlorhexidine 2% Cloths 1 Application(s) Topical <User Schedule>  cholecalciferol 2000 Unit(s) Oral daily  dextrose 5%. 1000 milliLiter(s) IV Continuous <Continuous>  dextrose 5%. 1000 milliLiter(s) IV Continuous <Continuous>  dextrose 50% Injectable 12.5 Gram(s) IV Push once  dextrose 50% Injectable 25 Gram(s) IV Push once  dextrose 50% Injectable 25 Gram(s) IV Push once  dextrose Oral Gel 15 Gram(s) Oral once PRN  doxycycline monohydrate Capsule 100 milliGRAM(s) Oral every 12 hours  DULoxetine 30 milliGRAM(s) Oral daily  furosemide    Tablet 40 milliGRAM(s) Oral daily  glucagon  Injectable 1 milliGRAM(s) IntraMuscular once  heparin   Injectable 49976 Unit(s) IV Push every 6 hours PRN  heparin   Injectable 5000 Unit(s) IV Push every 6 hours PRN  heparin  Infusion.  Unit(s)/Hr IV Continuous <Continuous>  HYDROmorphone  Injectable 1 milliGRAM(s) IV Push every 3 hours PRN  insulin glargine Injectable (LANTUS) 20 Unit(s) SubCutaneous every morning  insulin glargine Injectable (LANTUS) 20 Unit(s) SubCutaneous at bedtime  insulin lispro (ADMELOG) corrective regimen sliding scale   SubCutaneous at bedtime  insulin lispro (ADMELOG) corrective regimen sliding scale   SubCutaneous three times a day before meals  ketotifen 0.025% Ophthalmic Solution 1 Drop(s) Both EYES two times a day  lactulose Syrup 20 Gram(s) Oral every 8 hours PRN  loratadine 10 milliGRAM(s) Oral daily  nystatin Powder 1 Application(s) Topical every 12 hours  oxyCODONE  ER Tablet 60 milliGRAM(s) Oral every 12 hours  polyethylene glycol 3350 17 Gram(s) Oral daily                          11.4   14.06 )-----------( 180      ( 06 Sep 2023 04:25 )             36.7     09-06    136  |  91<L>  |  69.4<H>  ----------------------------<  267<H>  4.9   |  35.0<H>  |  2.18<H>    Ca    10.0      06 Sep 2023 04:25      PTT - ( 06 Sep 2023 04:25 )  PTT:75.2 sec  Urinalysis Basic - ( 06 Sep 2023 04:25 )    Color: x / Appearance: x / SG: x / pH: x  Gluc: 267 mg/dL / Ketone: x  / Bili: x / Urobili: x   Blood: x / Protein: x / Nitrite: x   Leuk Esterase: x / RBC: x / WBC x   Sq Epi: x / Non Sq Epi: x / Bacteria: x        Pain Service   [phone]

## 2023-09-06 NOTE — CONSULT NOTE ADULT - CONSULT REQUESTED BY NAME
Dr. Boateng
PICTURES WERE SENT OF PATIENTS WOUND AND REVIEWED BY DR JUAN.  PATIENT WAS ADVISED TO COME INTO THE OFFICE TO BE SEEN THIS AFTERNOON TO BE SEEN.    
Dr. Champagne
ML
Dr Donaldson
primary team
med team

## 2023-09-06 NOTE — CHART NOTE - NSCHARTNOTEFT_GEN_A_CORE
patient  refused bipap. pt was educated on risks vs benefits. However ,pt still refused after being informed & educated

## 2023-09-06 NOTE — CONSULT NOTE ADULT - NSCONSULTADDITIONALINFOA_GEN_ALL_CORE
Reference #: [id number]    PDI	Current Rx	Drug Type	Rx Written	Rx Dispensed	Drug	Quantity	Days Supply	Prescriber Name	Prescriber KATE #	Payment Method  A	Y	O	08/11/2023	08/16/2023	oxycontin er 60 mg tablet	50	30	Hemal Ge MD	[id number]	[id number]  A	N	O	07/14/2023	07/17/2023	oxycontin er 60 mg tablet	60	30	Hemal Ge MD	[id number]	[id number]  A	N	O	06/14/2023	06/18/2023	oxycontin er 60 mg tablet	60	30	Nael Mendoza	[id number]	[id number]  A	N	O	05/16/2023	05/22/2023	oxycontin er 60 mg tablet	50	30	Nael Mendoza	[id number]	[id number]  A	N	O	05/16/2023	05/20/2023	oxycontin er 60 mg tablet	10	5	Nael Mendoza	[id number]	[id number]  A	N	O	04/18/2023	04/21/2023	oxycontin er 60 mg tablet	60	30	Paula Mccann MD	[id number]	[id number]  A	N	O	03/22/2023	03/23/2023	oxycontin er 60 mg tablet	60	30	Paula Mccann MD	[id number]	Medicare

## 2023-09-07 LAB
ANION GAP SERPL CALC-SCNC: 14 MMOL/L — SIGNIFICANT CHANGE UP (ref 5–17)
APTT BLD: 26.7 SEC — SIGNIFICANT CHANGE UP (ref 24.5–35.6)
BASE EXCESS BLDA CALC-SCNC: 16.3 MMOL/L — HIGH (ref -2–3)
BUN SERPL-MCNC: 71.2 MG/DL — HIGH (ref 8–20)
CALCIUM SERPL-MCNC: 10.3 MG/DL — SIGNIFICANT CHANGE UP (ref 8.4–10.5)
CHLORIDE SERPL-SCNC: 95 MMOL/L — LOW (ref 96–108)
CO2 SERPL-SCNC: 33 MMOL/L — HIGH (ref 22–29)
CREAT SERPL-MCNC: 2.04 MG/DL — HIGH (ref 0.5–1.3)
EGFR: 26 ML/MIN/1.73M2 — LOW
GAS PNL BLDA: SIGNIFICANT CHANGE UP
GLUCOSE BLDC GLUCOMTR-MCNC: 220 MG/DL — HIGH (ref 70–99)
GLUCOSE BLDC GLUCOMTR-MCNC: 260 MG/DL — HIGH (ref 70–99)
GLUCOSE BLDC GLUCOMTR-MCNC: 261 MG/DL — HIGH (ref 70–99)
GLUCOSE BLDC GLUCOMTR-MCNC: 268 MG/DL — HIGH (ref 70–99)
GLUCOSE SERPL-MCNC: 252 MG/DL — HIGH (ref 70–99)
HCO3 BLDA-SCNC: 40 MMOL/L — HIGH (ref 21–28)
HCT VFR BLD CALC: 38.8 % — SIGNIFICANT CHANGE UP (ref 34.5–45)
HGB BLD-MCNC: 12.2 G/DL — SIGNIFICANT CHANGE UP (ref 11.5–15.5)
MCHC RBC-ENTMCNC: 28.2 PG — SIGNIFICANT CHANGE UP (ref 27–34)
MCHC RBC-ENTMCNC: 31.4 GM/DL — LOW (ref 32–36)
MCV RBC AUTO: 89.6 FL — SIGNIFICANT CHANGE UP (ref 80–100)
PCO2 BLDA: 55 MMHG — HIGH (ref 32–45)
PH BLDA: 7.47 — HIGH (ref 7.35–7.45)
PLATELET # BLD AUTO: 182 K/UL — SIGNIFICANT CHANGE UP (ref 150–400)
PO2 BLDA: 89 MMHG — SIGNIFICANT CHANGE UP (ref 83–108)
POTASSIUM SERPL-MCNC: 4.8 MMOL/L — SIGNIFICANT CHANGE UP (ref 3.5–5.3)
POTASSIUM SERPL-SCNC: 4.8 MMOL/L — SIGNIFICANT CHANGE UP (ref 3.5–5.3)
RBC # BLD: 4.33 M/UL — SIGNIFICANT CHANGE UP (ref 3.8–5.2)
RBC # FLD: 16.1 % — HIGH (ref 10.3–14.5)
SAO2 % BLDA: 97.4 % — SIGNIFICANT CHANGE UP (ref 94–98)
SODIUM SERPL-SCNC: 142 MMOL/L — SIGNIFICANT CHANGE UP (ref 135–145)
WBC # BLD: 13.11 K/UL — HIGH (ref 3.8–10.5)
WBC # FLD AUTO: 13.11 K/UL — HIGH (ref 3.8–10.5)

## 2023-09-07 PROCEDURE — 99232 SBSQ HOSP IP/OBS MODERATE 35: CPT

## 2023-09-07 PROCEDURE — 71045 X-RAY EXAM CHEST 1 VIEW: CPT | Mod: 26

## 2023-09-07 RX ORDER — OXYCODONE HYDROCHLORIDE 5 MG/1
60 TABLET ORAL EVERY 12 HOURS
Refills: 0 | Status: DISCONTINUED | OUTPATIENT
Start: 2023-09-07 | End: 2023-09-13

## 2023-09-07 RX ORDER — APIXABAN 2.5 MG/1
5 TABLET, FILM COATED ORAL EVERY 12 HOURS
Refills: 0 | Status: DISCONTINUED | OUTPATIENT
Start: 2023-09-07 | End: 2023-09-13

## 2023-09-07 RX ORDER — ONDANSETRON 8 MG/1
4 TABLET, FILM COATED ORAL ONCE
Refills: 0 | Status: COMPLETED | OUTPATIENT
Start: 2023-09-07 | End: 2023-09-07

## 2023-09-07 RX ADMIN — OXYCODONE HYDROCHLORIDE 60 MILLIGRAM(S): 5 TABLET ORAL at 18:30

## 2023-09-07 RX ADMIN — KETOTIFEN FUMARATE 1 DROP(S): 0.34 SOLUTION OPHTHALMIC at 18:08

## 2023-09-07 RX ADMIN — ONDANSETRON 4 MILLIGRAM(S): 8 TABLET, FILM COATED ORAL at 18:00

## 2023-09-07 RX ADMIN — Medication 1 DROP(S): at 18:08

## 2023-09-07 RX ADMIN — Medication 81 MILLIGRAM(S): at 11:53

## 2023-09-07 RX ADMIN — Medication 2: at 21:31

## 2023-09-07 RX ADMIN — CHLORHEXIDINE GLUCONATE 1 APPLICATION(S): 213 SOLUTION TOPICAL at 05:14

## 2023-09-07 RX ADMIN — Medication 6: at 17:41

## 2023-09-07 RX ADMIN — Medication 6: at 11:51

## 2023-09-07 RX ADMIN — Medication 1 DROP(S): at 06:01

## 2023-09-07 RX ADMIN — APIXABAN 5 MILLIGRAM(S): 2.5 TABLET, FILM COATED ORAL at 18:01

## 2023-09-07 RX ADMIN — CYCLOBENZAPRINE HYDROCHLORIDE 5 MILLIGRAM(S): 10 TABLET, FILM COATED ORAL at 14:04

## 2023-09-07 RX ADMIN — INSULIN GLARGINE 20 UNIT(S): 100 INJECTION, SOLUTION SUBCUTANEOUS at 21:30

## 2023-09-07 RX ADMIN — OXYCODONE HYDROCHLORIDE 5 MILLIGRAM(S): 5 TABLET ORAL at 21:30

## 2023-09-07 RX ADMIN — POLYETHYLENE GLYCOL 3350 17 GRAM(S): 17 POWDER, FOR SOLUTION ORAL at 11:53

## 2023-09-07 RX ADMIN — ATORVASTATIN CALCIUM 40 MILLIGRAM(S): 80 TABLET, FILM COATED ORAL at 21:31

## 2023-09-07 RX ADMIN — OXYCODONE HYDROCHLORIDE 5 MILLIGRAM(S): 5 TABLET ORAL at 06:34

## 2023-09-07 RX ADMIN — NYSTATIN CREAM 1 APPLICATION(S): 100000 CREAM TOPICAL at 18:01

## 2023-09-07 RX ADMIN — LORATADINE 10 MILLIGRAM(S): 10 TABLET ORAL at 11:53

## 2023-09-07 RX ADMIN — Medication 4: at 08:05

## 2023-09-07 RX ADMIN — Medication 3 MILLILITER(S): at 20:16

## 2023-09-07 RX ADMIN — NYSTATIN CREAM 1 APPLICATION(S): 100000 CREAM TOPICAL at 05:14

## 2023-09-07 RX ADMIN — BUDESONIDE AND FORMOTEROL FUMARATE DIHYDRATE 2 PUFF(S): 160; 4.5 AEROSOL RESPIRATORY (INHALATION) at 08:46

## 2023-09-07 RX ADMIN — Medication 3 MILLILITER(S): at 15:26

## 2023-09-07 RX ADMIN — Medication 40 MILLIGRAM(S): at 05:14

## 2023-09-07 RX ADMIN — BUDESONIDE AND FORMOTEROL FUMARATE DIHYDRATE 2 PUFF(S): 160; 4.5 AEROSOL RESPIRATORY (INHALATION) at 20:16

## 2023-09-07 RX ADMIN — DULOXETINE HYDROCHLORIDE 30 MILLIGRAM(S): 30 CAPSULE, DELAYED RELEASE ORAL at 11:53

## 2023-09-07 RX ADMIN — Medication 2000 UNIT(S): at 11:53

## 2023-09-07 RX ADMIN — OXYCODONE HYDROCHLORIDE 5 MILLIGRAM(S): 5 TABLET ORAL at 22:30

## 2023-09-07 RX ADMIN — Medication 1 APPLICATION(S): at 11:56

## 2023-09-07 RX ADMIN — INSULIN GLARGINE 20 UNIT(S): 100 INJECTION, SOLUTION SUBCUTANEOUS at 08:05

## 2023-09-07 RX ADMIN — OXYCODONE HYDROCHLORIDE 5 MILLIGRAM(S): 5 TABLET ORAL at 05:34

## 2023-09-07 RX ADMIN — KETOTIFEN FUMARATE 1 DROP(S): 0.34 SOLUTION OPHTHALMIC at 06:01

## 2023-09-07 RX ADMIN — LACTULOSE 20 GRAM(S): 10 SOLUTION ORAL at 05:34

## 2023-09-07 RX ADMIN — OXYCODONE HYDROCHLORIDE 60 MILLIGRAM(S): 5 TABLET ORAL at 18:00

## 2023-09-07 RX ADMIN — Medication 3 MILLILITER(S): at 08:46

## 2023-09-07 NOTE — PROGRESS NOTE ADULT - ASSESSMENT
67 y/o morbidly obese female with h/o asthma on home o2, HFp EF, spinal stenosis, dm, ckd, , dvt lt. leg in 2004, treated and resolved came in for increasing sob for past 1 week. pt. stated that she has baseline chronic sob but last week was more than her baseline and weakness. As per pt. she gets oob and uses walker. In the ER  first trop 0.09 , d dimer 407, due to pt's weight she exceeds the wt. limit for ct. Cardiology following, continue diuresis, Heparin drip though low suspicion for clot however continues for potential ACS, unable to stress test, unable to perform CTA / VQ scan, LHC / RHC due to weight, Pulm following, severe Pulm HTN, vague h/o Asthma, patient is at baseline oxygenation, however could benefit still from further diuresis.     #Chronic HFpEF   ·  ECHO performed with 55% EF, moderate reduced RV function, severe RVSP elevation   - cardiology signed off  - c/w daily lasix   - euvolemic, no longer acutely exacerbated    #Elevated troponin.   unable to obtain Ischemic workup due to body habitus    #Pulmonary HTN   per ECHO RVSP severely elevated > 70, likely unable to obtain RHC given weight, possibly Type II/III   euvolemic  c/w daily lasix  c/w oxygen long term  symbicort w/ spacer BID    #Obesity hypoventilation syndrome   declining CPAP at this time    #Chronic respiratory failure with hypoxia  Multifactorial- restrictive lung disease 2/2 morbid obesity, HFpEF exacerbation / Severe pulm HTN  Pulm following, short course of nebs, wean to PRN, symbicort with spacer ordered given h/o asthma, with short course of medrol   - patient at baseline oxygen, no longer in acute respiratory failure    #JACQUES on CKD stage 3    - cr 2.03, improving, nearing baseline   avoid nephrotoxic meds, hold lisinopril for now  f/u renal fx  nephro on board    #DM (diabetes mellitus).   uncontrolled  SSI  c/w Lantus BID    #Chronic Edema   Lymphedema noted in B/L LE's, chronic changes of Abdomen with weeping while lifting pannus   Wound care following   Skin maceration in multiple fold areas, Dressing over L. groin wound under pannus     #Wound of foot.   rt. foot wound above 5th toe , recently got skin laceration, will keep on doxycycline  X ray foot shows no signs of foreign body / OM, nonspecific edema of foot   Podiatry consulted, however primarily chronic changes of both feet     #Morbid Obesity   Patient > 580 lbs, greatly impacting potential health care, unable to use CT imaging, NM stress, V/Q scan  Bariatric consult outpatient    #Chronic pain  on oxy ER 60mg Q12  PRN oxy IR 5mg Q8    #Anxiety  likely in the setting of chronic pain and chronic pulmonary disease  c/w xanax 0.25mg Q12  c/w cymbalta 30mg Q24    #Fall at home - Severely deconditioned - PT eval .     DVT prophylaxis: eliquis Q12  Diet: DASH   Dispo: Patient agreeable to [facility], Son updated over the phone, awaiting placement and auth

## 2023-09-07 NOTE — CHART NOTE - NSCHARTNOTEFT_GEN_A_CORE
INTERVAL HX: RN reached out regarding KUB - unable to obtain as 200lb max for portable xray and patient could not fit on stretcher for x-ray. KUB ordered for constipation. +BM noted, now complaining of nausea.   Patient is a 68 year old female with PMH of mobid obesity, asthma on home o2, HFpEF, spinal stenosis, DM, CKD, DVT; admitted for acute on chronic CHF.   Patient seen and evaluated at bedside. Patient complaining of nausea with the urge to vomit. +BM and flatus today. Patient still feels abdominal fullness. Denies chest pain, palpitations, abdominal pain, V/D, dysuria.     Vitals   T(C): 36.8  HR: 84 regular   BP: 136/82   RR: 18 unlabored   SpO2: 94% on 4 L NC     Physical Exam   CONSTITUTIONAL: Patient laying in bed comfortably, no apparent distress  RESP: No respiratory distress, no use of accessory muscles; CTA b/l, no WRR  CV: RRR, +S1S2  GI: +BS, Soft, non-distended, non-tender to palpation in all four quadrants, no rebound tenderness or guarding.     A/P: 68 year old female with PMH of mobid obesity, asthma on home o2, HFpEF, spinal stenosis, DM, CKD, DVT; admitted for acute on chronic CHF. Seen today for constipation and nausea, VSS     Constipation and Nausea   -ordered zofran 4mg IV push x1   -patient refusing dulcolax at this time. Wants to continue with daily miralax    Patient demonstrated understanding of plan and all questions/concerns addressed   Will continue to monitor   RN to notify provider with any changes in patient status.

## 2023-09-07 NOTE — PROGRESS NOTE ADULT - ASSESSMENT
68F never smoker with hx of HFpEF, restrictive lung disease/OHS/ENRIQUE on home O2 4L, pulmonary HTN, morbid obesity, asthma, CKD3, IDDM2, uterine cancer s/p MEGAN, DVT LLE (2004), chronic leg edema 2/2 venous stasis, spinal stenosis with chronic back pain presented 8/30 with shortness of breath found to have NSTEMI/concern for PE but unable to obtain testing due to weight now on empiric AC and being diuresed     Acute on chronic hypoxic/hypercapnic respiratory failure secondary to OHS/ENRIQUE/restrictive lung disease with HFpEF exacerbation and possible PE   c/w diuresis for goal net even  may benefit from Diamox given alkalosis but reports sulfa allergy   LE duplex neg and d dimer negative when age adjusted but is at high risk for DVT/PE given bedbound status and prior reported hx; c/w empiric ac as tolerated; denies hx of GI bleed   ABG with evidence of chronic hypercapnia; unable to tolerate NIV due to claustrophobia   wean supplemental O2 as tolerated for goal sat >90%  PT/OT   incentive geri   c/w Symbicort for reported asthma  no evidence of asthma exacerbation currently   can change nebs to prn   caution with opiates   aspiration precautions   pulm will f/u prn; please call with questions/concerns

## 2023-09-07 NOTE — PHYSICAL THERAPY INITIAL EVALUATION ADULT - WEIGHT-BEARING RESTRICTIONS: STAND/SIT, REHAB EVAL
full weight-bearing PAST MEDICAL HISTORY:  Anxiety     Breast Neoplasm chemo and RT in 2004    Hiatal hernia     History of umbilical hernia     Hyperlipidemia     Hyperparathyroidism     Rectocele     Sensorineural hearing loss     Small bowel obstruction

## 2023-09-07 NOTE — PROGRESS NOTE ADULT - ASSESSMENT
68 year old female with PMH of morbidly obesity (500+lbs), DM, HTN, HLD, asthma, HFpEF, chronic hypoxic respiratory failure on 4L home oxygen, CKD, spinal stenosis (cervical spine + lumbar spine) and hx of DVT presents with worsening SOB. CXR showed congestive changes. Admitted for decompensated heart failure. TTE showed diastolic dysfunction and severe pulmonary HTN. Right / left heart cath and nuclear stress test not performed as patient exceeds weight limit of cath lab tables as per Cardiology. LE dopplers negative for DVT. Currently on escalating doses of IV diuretics. Nephrology is consulted for worsening NED on CKD.     Ned onn CKD stage III    -Baseline creatinine is ~1.6-1.8mg/dL in Oct 2022 (as per [facility])  -On admission, creatinine was 1.89mg/dL     -SCr up trending since admission- peaked at 2.3  - UA with trace blood, negative for rbcs, protein  - Renal US reviewed; no HDN    Ned likely in setting of CHF exacerbation  - SCr improving to 2.0  - Continue po lasix   - Continue to hold ACE-I

## 2023-09-07 NOTE — PROGRESS NOTE ADULT - ASSESSMENT
68F  h/o asthma on home o2, HFp EF, spinal stenosis, dm, ckd, , dvt lt. leg in 2004, treated and resolved came in for increasing sob for past 1 week.  on chronic opioid therapy - oxycontin 60 bid  due for rehab    Pain controlled  Pain service will sign off  Please reconsult as needed

## 2023-09-07 NOTE — PROGRESS NOTE ADULT - TIME BILLING
Detailed chart review and discussion with Cardiology and Pulmonology teams given complexity of case and weight limiting testing that would benefit patient therapy at this time.
Pain Management - chart review, patient interview
greater than 50% of time spent reviewing labs, notes, orders and radiographs, coordinating care  discussed with pt bedside, ER and med team
greater than 50% of time spent reviewing labs, notes, orders and radiographs, coordinating care  discussed with pt bedside, ER and med team

## 2023-09-08 ENCOUNTER — TRANSCRIPTION ENCOUNTER (OUTPATIENT)
Age: 68
End: 2023-09-08

## 2023-09-08 LAB
ANION GAP SERPL CALC-SCNC: 11 MMOL/L — SIGNIFICANT CHANGE UP (ref 5–17)
BASOPHILS # BLD AUTO: 0.11 K/UL — SIGNIFICANT CHANGE UP (ref 0–0.2)
BASOPHILS NFR BLD AUTO: 0.9 % — SIGNIFICANT CHANGE UP (ref 0–2)
BUN SERPL-MCNC: 72.5 MG/DL — HIGH (ref 8–20)
CALCIUM SERPL-MCNC: 10.1 MG/DL — SIGNIFICANT CHANGE UP (ref 8.4–10.5)
CHLORIDE SERPL-SCNC: 92 MMOL/L — LOW (ref 96–108)
CO2 SERPL-SCNC: 33 MMOL/L — HIGH (ref 22–29)
CREAT SERPL-MCNC: 1.98 MG/DL — HIGH (ref 0.5–1.3)
EGFR: 27 ML/MIN/1.73M2 — LOW
EOSINOPHIL # BLD AUTO: 0.65 K/UL — HIGH (ref 0–0.5)
EOSINOPHIL NFR BLD AUTO: 5.3 % — SIGNIFICANT CHANGE UP (ref 0–6)
GLUCOSE BLDC GLUCOMTR-MCNC: 198 MG/DL — HIGH (ref 70–99)
GLUCOSE BLDC GLUCOMTR-MCNC: 201 MG/DL — HIGH (ref 70–99)
GLUCOSE BLDC GLUCOMTR-MCNC: 226 MG/DL — HIGH (ref 70–99)
GLUCOSE BLDC GLUCOMTR-MCNC: 235 MG/DL — HIGH (ref 70–99)
GLUCOSE SERPL-MCNC: 208 MG/DL — HIGH (ref 70–99)
HCT VFR BLD CALC: 37.3 % — SIGNIFICANT CHANGE UP (ref 34.5–45)
HGB BLD-MCNC: 11.6 G/DL — SIGNIFICANT CHANGE UP (ref 11.5–15.5)
IMM GRANULOCYTES NFR BLD AUTO: 1.5 % — HIGH (ref 0–0.9)
LYMPHOCYTES # BLD AUTO: 1.42 K/UL — SIGNIFICANT CHANGE UP (ref 1–3.3)
LYMPHOCYTES # BLD AUTO: 11.6 % — LOW (ref 13–44)
MCHC RBC-ENTMCNC: 28.4 PG — SIGNIFICANT CHANGE UP (ref 27–34)
MCHC RBC-ENTMCNC: 31.1 GM/DL — LOW (ref 32–36)
MCV RBC AUTO: 91.2 FL — SIGNIFICANT CHANGE UP (ref 80–100)
MONOCYTES # BLD AUTO: 1.07 K/UL — HIGH (ref 0–0.9)
MONOCYTES NFR BLD AUTO: 8.7 % — SIGNIFICANT CHANGE UP (ref 2–14)
NEUTROPHILS # BLD AUTO: 8.81 K/UL — HIGH (ref 1.8–7.4)
NEUTROPHILS NFR BLD AUTO: 72 % — SIGNIFICANT CHANGE UP (ref 43–77)
PLATELET # BLD AUTO: 171 K/UL — SIGNIFICANT CHANGE UP (ref 150–400)
POTASSIUM SERPL-MCNC: 4.5 MMOL/L — SIGNIFICANT CHANGE UP (ref 3.5–5.3)
POTASSIUM SERPL-SCNC: 4.5 MMOL/L — SIGNIFICANT CHANGE UP (ref 3.5–5.3)
RBC # BLD: 4.09 M/UL — SIGNIFICANT CHANGE UP (ref 3.8–5.2)
RBC # FLD: 16.1 % — HIGH (ref 10.3–14.5)
SODIUM SERPL-SCNC: 136 MMOL/L — SIGNIFICANT CHANGE UP (ref 135–145)
WBC # BLD: 12.24 K/UL — HIGH (ref 3.8–10.5)
WBC # FLD AUTO: 12.24 K/UL — HIGH (ref 3.8–10.5)

## 2023-09-08 PROCEDURE — 99232 SBSQ HOSP IP/OBS MODERATE 35: CPT

## 2023-09-08 RX ORDER — ONDANSETRON 8 MG/1
4 TABLET, FILM COATED ORAL ONCE
Refills: 0 | Status: COMPLETED | OUTPATIENT
Start: 2023-09-08 | End: 2023-09-08

## 2023-09-08 RX ADMIN — NYSTATIN CREAM 1 APPLICATION(S): 100000 CREAM TOPICAL at 17:15

## 2023-09-08 RX ADMIN — KETOTIFEN FUMARATE 1 DROP(S): 0.34 SOLUTION OPHTHALMIC at 17:18

## 2023-09-08 RX ADMIN — CHLORHEXIDINE GLUCONATE 1 APPLICATION(S): 213 SOLUTION TOPICAL at 05:11

## 2023-09-08 RX ADMIN — ONDANSETRON 4 MILLIGRAM(S): 8 TABLET, FILM COATED ORAL at 18:09

## 2023-09-08 RX ADMIN — OXYCODONE HYDROCHLORIDE 5 MILLIGRAM(S): 5 TABLET ORAL at 21:25

## 2023-09-08 RX ADMIN — Medication 1 DROP(S): at 17:17

## 2023-09-08 RX ADMIN — Medication 4: at 17:15

## 2023-09-08 RX ADMIN — Medication 3 MILLILITER(S): at 16:14

## 2023-09-08 RX ADMIN — INSULIN GLARGINE 20 UNIT(S): 100 INJECTION, SOLUTION SUBCUTANEOUS at 07:41

## 2023-09-08 RX ADMIN — OXYCODONE HYDROCHLORIDE 60 MILLIGRAM(S): 5 TABLET ORAL at 17:15

## 2023-09-08 RX ADMIN — KETOTIFEN FUMARATE 1 DROP(S): 0.34 SOLUTION OPHTHALMIC at 05:44

## 2023-09-08 RX ADMIN — OXYCODONE HYDROCHLORIDE 60 MILLIGRAM(S): 5 TABLET ORAL at 06:10

## 2023-09-08 RX ADMIN — Medication 2: at 07:40

## 2023-09-08 RX ADMIN — BUDESONIDE AND FORMOTEROL FUMARATE DIHYDRATE 2 PUFF(S): 160; 4.5 AEROSOL RESPIRATORY (INHALATION) at 09:45

## 2023-09-08 RX ADMIN — ATORVASTATIN CALCIUM 40 MILLIGRAM(S): 80 TABLET, FILM COATED ORAL at 21:23

## 2023-09-08 RX ADMIN — POLYETHYLENE GLYCOL 3350 17 GRAM(S): 17 POWDER, FOR SOLUTION ORAL at 12:12

## 2023-09-08 RX ADMIN — APIXABAN 5 MILLIGRAM(S): 2.5 TABLET, FILM COATED ORAL at 17:16

## 2023-09-08 RX ADMIN — Medication 3 MILLILITER(S): at 09:45

## 2023-09-08 RX ADMIN — OXYCODONE HYDROCHLORIDE 5 MILLIGRAM(S): 5 TABLET ORAL at 20:25

## 2023-09-08 RX ADMIN — Medication 2000 UNIT(S): at 12:11

## 2023-09-08 RX ADMIN — LORATADINE 10 MILLIGRAM(S): 10 TABLET ORAL at 12:12

## 2023-09-08 RX ADMIN — Medication 3 MILLILITER(S): at 21:22

## 2023-09-08 RX ADMIN — DULOXETINE HYDROCHLORIDE 30 MILLIGRAM(S): 30 CAPSULE, DELAYED RELEASE ORAL at 12:11

## 2023-09-08 RX ADMIN — APIXABAN 5 MILLIGRAM(S): 2.5 TABLET, FILM COATED ORAL at 05:08

## 2023-09-08 RX ADMIN — NYSTATIN CREAM 1 APPLICATION(S): 100000 CREAM TOPICAL at 05:09

## 2023-09-08 RX ADMIN — Medication 4: at 12:11

## 2023-09-08 RX ADMIN — Medication 1 APPLICATION(S): at 12:12

## 2023-09-08 RX ADMIN — INSULIN GLARGINE 20 UNIT(S): 100 INJECTION, SOLUTION SUBCUTANEOUS at 21:23

## 2023-09-08 RX ADMIN — Medication 40 MILLIGRAM(S): at 05:08

## 2023-09-08 RX ADMIN — OXYCODONE HYDROCHLORIDE 60 MILLIGRAM(S): 5 TABLET ORAL at 18:16

## 2023-09-08 RX ADMIN — Medication 1 DROP(S): at 05:44

## 2023-09-08 RX ADMIN — OXYCODONE HYDROCHLORIDE 60 MILLIGRAM(S): 5 TABLET ORAL at 05:08

## 2023-09-08 RX ADMIN — BUDESONIDE AND FORMOTEROL FUMARATE DIHYDRATE 2 PUFF(S): 160; 4.5 AEROSOL RESPIRATORY (INHALATION) at 21:22

## 2023-09-08 RX ADMIN — Medication 81 MILLIGRAM(S): at 12:11

## 2023-09-08 RX ADMIN — Medication 3 MILLILITER(S): at 02:34

## 2023-09-08 NOTE — PROGRESS NOTE ADULT - ASSESSMENT
67 y/o morbidly obese female with h/o asthma on home o2, HFp EF, spinal stenosis, dm, ckd, , dvt lt. leg in 2004, treated and resolved came in for increasing sob for past 1 week. pt. stated that she has baseline chronic sob but last week was more than her baseline and weakness. As per pt. she gets oob and uses walker. In the ER  first trop 0.09 , d dimer 407, due to pt's weight she exceeds the wt. limit for ct. Cardiology following, continue diuresis, Heparin drip though low suspicion for clot however continues for potential ACS, unable to stress test, unable to perform CTA / VQ scan, LHC / RHC due to weight, Pulm following, severe Pulm HTN, vague h/o Asthma, patient is at baseline oxygenation, however could benefit still from further diuresis.     #Chronic HFpEF   ·  ECHO performed with 55% EF, moderate reduced RV function, severe RVSP elevation   - cardiology signed off  - c/w daily lasix   - euvolemic, no longer acutely exacerbated    #Elevated troponin.   unable to obtain Ischemic workup due to body habitus    #Pulmonary HTN   per ECHO RVSP severely elevated > 70, likely unable to obtain RHC given weight, possibly Type II/III   euvolemic  c/w daily lasix  c/w oxygen long term  symbicort w/ spacer BID    #Obesity hypoventilation syndrome   declining CPAP at this time    #Chronic respiratory failure with hypoxia  Multifactorial- restrictive lung disease 2/2 morbid obesity, HFpEF exacerbation / Severe pulm HTN  Pulm following, short course of nebs, wean to PRN, symbicort with spacer ordered given h/o asthma, with short course of medrol   - patient at baseline oxygen, no longer in acute respiratory failure    #JACQUES on CKD stage 3    - cr 2.03, improving, nearing baseline   avoid nephrotoxic meds, hold lisinopril for now  f/u renal fx  nephro on board    #DM (diabetes mellitus).   uncontrolled  SSI  c/w Lantus BID    #Chronic Edema   Lymphedema noted in B/L LE's, chronic changes of Abdomen with weeping while lifting pannus   Wound care following   Skin maceration in multiple fold areas, Dressing over L. groin wound under pannus     #Wound of foot.   rt. foot wound above 5th toe , recently got skin laceration, will keep on doxycycline  X ray foot shows no signs of foreign body / OM, nonspecific edema of foot   Podiatry consulted, however primarily chronic changes of both feet     #Morbid Obesity   Patient > 580 lbs, greatly impacting potential health care, unable to use CT imaging, NM stress, V/Q scan  Bariatric consult outpatient    #Chronic pain  on oxy ER 60mg Q12  PRN oxy IR 5mg Q8    #Anxiety  likely in the setting of chronic pain and chronic pulmonary disease  c/w xanax 0.25mg Q12  c/w cymbalta 30mg Q24    #Fall at home - Severely deconditioned - PT eval .     DVT prophylaxis: eliquis Q12  Diet: DASH   Dispo: Awaiting Auth for [facility], patient does not want to go to [place] for [facility] however amenable, will decide tomorrow

## 2023-09-08 NOTE — DISCHARGE NOTE PROVIDER - HOSPITAL COURSE
67 y/o morbidly obese female with h/o asthma on home o2, HFp EF, spinal stenosis, dm, ckd, , dvt lt. leg in 2004, treated and resolved came in for increasing sob for past 1 week. pt. stated that she has baseline chronic sob but last week was more than her baseline and weakness. As per pt. she gets oob and uses walker. In the ER  first trop 0.09 , d dimer 407, due to pt's weight she exceeds the wt. limit for ct. Cardiology following, continue diuresis, Heparin drip though low suspicion for clot however continues for potential ACS, unable to stress test, unable to perform CTA / VQ scan, LHC / RHC due to weight, Pulm following, severe Pulm HTN, vague h/o Asthma, patient is at baseline oxygenation, however needed further diuresis. Continued on PO abx for PNA, diuresis escalated from lasix to Bumex BID. started on Heparin drip for suspected PE/DVT. Euvolemic, deescalated IV diuretics to PO Lasix daily. Heparin switched to Eliquis. patient clinically improved, deemed fit for DC to [facility]. 69 y/o morbidly obese female with h/o asthma on home o2, HFp EF, spinal stenosis, dm, ckd, , dvt lt. leg in 2004, treated and resolved came in for increasing sob for past 1 week. pt. stated that she has baseline chronic sob but last week was more than her baseline and weakness. As per pt. she gets oob and uses walker. In the ER  first trop 0.09 , d dimer 407, due to pt's weight she exceeds the wt. limit for ct. Cardiology following, continue diuresis, Heparin drip though low suspicion for clot however continues for potential ACS, unable to stress test, unable to perform CTA / VQ scan, LHC / RHC due to weight, Pulm following, severe Pulm HTN, vague h/o Asthma, patient is at baseline oxygenation, however needed further diuresis. Continued on PO abx for PNA, diuresis escalated from lasix to Bumex BID. started on Heparin drip for suspected PE/DVT. Euvolemic, deescalated IV diuretics to PO Lasix daily.  Heparin switched to Eliquis. patient clinically improved, deemed fit for DC to [facility].

## 2023-09-08 NOTE — DISCHARGE NOTE PROVIDER - NSDCCPCAREPLAN_GEN_ALL_CORE_FT
PRINCIPAL DISCHARGE DIAGNOSIS  Diagnosis: NSTEMI (non-ST elevation myocardial infarction)  Assessment and Plan of Treatment: Take all meds as prescribed, unable to perform ischemic workup at this time due to body habitus. Follow up with cardiology outpatient.      SECONDARY DISCHARGE DIAGNOSES  Diagnosis: Shortness of breath  Assessment and Plan of Treatment: Continue using oxygen round the clock. Take all meds and inhalers as prescribed. Follow up with pulmonology outpatient.    Diagnosis: Morbid obesity  Assessment and Plan of Treatment: Please follow up with bariatric surgeon outpatient. Adhere to strict diet and follow up w/ dietician recommendations of 1500 KCal per day    Diagnosis: DM (diabetes mellitus)  Assessment and Plan of Treatment: Take insulin as directed    Diagnosis: Chronic kidney disease, unspecified CKD stage  Assessment and Plan of Treatment: Improving, please follow up with nephrology outpatient     PRINCIPAL DISCHARGE DIAGNOSIS  Diagnosis: NSTEMI (non-ST elevation myocardial infarction)  Assessment and Plan of Treatment: Take all meds as prescribed, unable to perform ischemic workup at this time due to body habitus. Follow up with cardiology outpatient.      SECONDARY DISCHARGE DIAGNOSES  Diagnosis: Chronic kidney disease, unspecified CKD stage  Assessment and Plan of Treatment: Improving, please follow up with nephrology outpatient    Diagnosis: Morbid obesity  Assessment and Plan of Treatment: Please follow up with bariatric surgeon outpatient. Adhere to strict diet and follow up w/ dietician recommendations of 1500 KCal per day    Diagnosis: DM (diabetes mellitus)  Assessment and Plan of Treatment: Take insulin as directed    Diagnosis: Acute on chronic respiratory failure with hypoxia  Assessment and Plan of Treatment: Continue using oxygen round the clock. Take all meds and inhalers as prescribed. Follow up with pulmonology outpatient.     PRINCIPAL DISCHARGE DIAGNOSIS  Diagnosis: NSTEMI (non-ST elevation myocardial infarction)  Assessment and Plan of Treatment: Take all meds as prescribed, unable to perform ischemic workup at this time due to body habitus. Follow up with cardiology outpatient.      SECONDARY DISCHARGE DIAGNOSES  Diagnosis: Acute on chronic diastolic heart failure  Assessment and Plan of Treatment: ct diuresis, follow up with cardiology    Diagnosis: Chronic kidney disease, unspecified CKD stage  Assessment and Plan of Treatment: Improving, please follow up with nephrology outpatient    Diagnosis: Morbid obesity  Assessment and Plan of Treatment: Please follow up with bariatric surgeon outpatient. Adhere to strict diet and follow up w/ dietician recommendations of 1500 KCal per day    Diagnosis: DM (diabetes mellitus)  Assessment and Plan of Treatment: Take insulin as directed    Diagnosis: Acute on chronic respiratory failure with hypoxia  Assessment and Plan of Treatment: Continue using oxygen round the clock. Take all meds and inhalers as prescribed. Follow up with pulmonology outpatient.

## 2023-09-08 NOTE — DISCHARGE NOTE PROVIDER - CARE PROVIDERS DIRECT ADDRESSES
[email or web address]

## 2023-09-08 NOTE — DISCHARGE NOTE PROVIDER - NSDCMRMEDTOKEN_GEN_ALL_CORE_FT
ADVAIR -21 MCG INHALER: TAKE 2 PUFFS BY MOUTH TWICE A DAY  alcohol swabs : Apply topically to affected area 4 times a day   Aspirin Enteric Coated 81 mg oral delayed release tablet: 1 tab(s) orally once a day  furosemide 40 mg oral tablet: 1 tab(s) orally once a day  glucometer (per patient&#x27;s insurance): Test blood sugars four times a day. Dispense #1 glucometer.  lancets: 1 application subcutaneously 4 times a day   lisinopril 5 mg oral tablet: 1 tab(s) orally once a day  loratadine 10 mg oral tablet: 1 tab(s) orally once a day  NovoLOG FlexPen 100 units/mL injectable solution: 22 unit(s) injectable 3 times a day (before meals)  nystatin 100,000 units/g topical powder: 1 application topically 2 times a day  ondansetron 4 mg oral tablet: 1 tab(s) orally 3 times a day, As Needed  OxyCONTIN 60 mg oral tablet, extended release: 1 tab(s) orally every 12 hours  polyethylene glycol 3350 oral powder for reconstitution: 17 gram(s) orally once a day  ProAir HFA 90 mcg/inh inhalation aerosol: 2 puff(s) inhaled every 6 hours, As Needed  rosuvastatin 20 mg oral tablet: 1 tab(s) orally once a day (at bedtime)  test strips (per patient&#x27;s insurance): 1 application subcutaneously 4 times a day. ** Compatible with patient&#x27;s glucometer **  Toujeo SoloStar 300 units/mL subcutaneous solution: 60 unit(s) subcutaneous 2 times a day - once in AM and once at bedtime  Vitamin D2 2000 intl units (50 mcg) oral capsule: 1 cap(s) orally once a day   ADVAIR -21 MCG INHALER: TAKE 2 PUFFS BY MOUTH TWICE A DAY  apixaban 5 mg oral tablet: 1 tab(s) orally every 12 hours  Aspirin Enteric Coated 81 mg oral delayed release tablet: 1 tab(s) orally once a day  cadexomer iodine 0.9% topical gel: 1 Apply topically to affected area once a day  cholecalciferol oral tablet: 2000 unit(s) orally once a day  DULoxetine 30 mg oral delayed release capsule: 1 cap(s) orally once a day  furosemide 40 mg oral tablet: 1 tab(s) orally once a day  lisinopril 5 mg oral tablet: 1 tab(s) orally once a day  loratadine 10 mg oral tablet: 1 tab(s) orally once a day  NovoLOG FlexPen 100 units/mL injectable solution: 10 unit(s) injectable 3 times a day (before meals)  nystatin 100,000 units/g topical powder: 1 application topically 2 times a day  ocular lubricant ophthalmic solution: 1 drop(s) to each affected eye 2 times a day  OxyCONTIN 60 mg oral tablet, extended release: 1 tab(s) orally every 12 hours  polyethylene glycol 3350 oral powder for reconstitution: 17 gram(s) orally once a day  ProAir HFA 90 mcg/inh inhalation aerosol: 2 puff(s) inhaled every 6 hours, As Needed  rosuvastatin 20 mg oral tablet: 1 tab(s) orally once a day (at bedtime)  Toujeo SoloStar 300 units/mL subcutaneous solution: 60 unit(s) subcutaneous 2 times a day - once in AM and once at bedtime

## 2023-09-08 NOTE — CHART NOTE - NSCHARTNOTEFT_GEN_A_CORE
RN called for patient complaining of mild nausea, similar to yesterday  No other complaints, VSS, and NAD   Ordered zofran 4mg IV push x1 stat   Will continue to monitor   RN to notify provider with any changes in patient status

## 2023-09-08 NOTE — DISCHARGE NOTE PROVIDER - PROVIDER TOKENS
PROVIDER:[TOKEN:[[id number]],FOLLOWUP:[2 weeks]],PROVIDER:[TOKEN:[[id number]],FOLLOWUP:[1 week]],PROVIDER:[TOKEN:[[id number]],FOLLOWUP:[2 weeks]]
No

## 2023-09-08 NOTE — DISCHARGE NOTE PROVIDER - CARE PROVIDER_API CALL
Rita Schaefer  Cardiology  [address], [address]  [place] [address]  Phone: [phone]  Fax: [phone]  Follow Up Time: 2 weeks    Morena Gonzalez  Nephrology  [address] [place], [place] [address]  Phone: [phone]  Fax: [phone]  Follow Up Time: 1 week    Danny Snow  Pulmonary Disease  [address], [address]  [place] [address]  Phone: [phone]  Fax: [phone]  Follow Up Time: 2 weeks

## 2023-09-08 NOTE — DISCHARGE NOTE PROVIDER - ATTENDING DISCHARGE PHYSICAL EXAMINATION:
General: Morbidly obese female in bed not in distress  eyes : PERRL. intact EOM.   HENT: AT, NC  Chest: Symmetric chest rise, difficult to  auscultate breath sounds due to body habitus   Heart: S1,S2. RRR. no heart murmur.  Ext: bilateral non-pitting stasis edema, stasis dermatitis  vascular : distal pulses palpable  Neuro: AAO x3. no focal weakness

## 2023-09-09 LAB
ANION GAP SERPL CALC-SCNC: 13 MMOL/L — SIGNIFICANT CHANGE UP (ref 5–17)
BUN SERPL-MCNC: 69.9 MG/DL — HIGH (ref 8–20)
CALCIUM SERPL-MCNC: 10 MG/DL — SIGNIFICANT CHANGE UP (ref 8.4–10.5)
CHLORIDE SERPL-SCNC: 92 MMOL/L — LOW (ref 96–108)
CO2 SERPL-SCNC: 33 MMOL/L — HIGH (ref 22–29)
CREAT SERPL-MCNC: 1.92 MG/DL — HIGH (ref 0.5–1.3)
EGFR: 28 ML/MIN/1.73M2 — LOW
GLUCOSE BLDC GLUCOMTR-MCNC: 196 MG/DL — HIGH (ref 70–99)
GLUCOSE BLDC GLUCOMTR-MCNC: 232 MG/DL — HIGH (ref 70–99)
GLUCOSE BLDC GLUCOMTR-MCNC: 235 MG/DL — HIGH (ref 70–99)
GLUCOSE BLDC GLUCOMTR-MCNC: 254 MG/DL — HIGH (ref 70–99)
GLUCOSE SERPL-MCNC: 194 MG/DL — HIGH (ref 70–99)
HCT VFR BLD CALC: 40.3 % — SIGNIFICANT CHANGE UP (ref 34.5–45)
HGB BLD-MCNC: 12.1 G/DL — SIGNIFICANT CHANGE UP (ref 11.5–15.5)
MCHC RBC-ENTMCNC: 27.6 PG — SIGNIFICANT CHANGE UP (ref 27–34)
MCHC RBC-ENTMCNC: 30 GM/DL — LOW (ref 32–36)
MCV RBC AUTO: 91.8 FL — SIGNIFICANT CHANGE UP (ref 80–100)
PLATELET # BLD AUTO: 173 K/UL — SIGNIFICANT CHANGE UP (ref 150–400)
POTASSIUM SERPL-MCNC: 4.6 MMOL/L — SIGNIFICANT CHANGE UP (ref 3.5–5.3)
POTASSIUM SERPL-SCNC: 4.6 MMOL/L — SIGNIFICANT CHANGE UP (ref 3.5–5.3)
RBC # BLD: 4.39 M/UL — SIGNIFICANT CHANGE UP (ref 3.8–5.2)
RBC # FLD: 16 % — HIGH (ref 10.3–14.5)
SODIUM SERPL-SCNC: 138 MMOL/L — SIGNIFICANT CHANGE UP (ref 135–145)
WBC # BLD: 11.32 K/UL — HIGH (ref 3.8–10.5)
WBC # FLD AUTO: 11.32 K/UL — HIGH (ref 3.8–10.5)

## 2023-09-09 PROCEDURE — 99232 SBSQ HOSP IP/OBS MODERATE 35: CPT

## 2023-09-09 RX ORDER — ONDANSETRON 8 MG/1
4 TABLET, FILM COATED ORAL ONCE
Refills: 0 | Status: COMPLETED | OUTPATIENT
Start: 2023-09-09 | End: 2023-09-09

## 2023-09-09 RX ADMIN — OXYCODONE HYDROCHLORIDE 60 MILLIGRAM(S): 5 TABLET ORAL at 05:07

## 2023-09-09 RX ADMIN — APIXABAN 5 MILLIGRAM(S): 2.5 TABLET, FILM COATED ORAL at 17:46

## 2023-09-09 RX ADMIN — NYSTATIN CREAM 1 APPLICATION(S): 100000 CREAM TOPICAL at 05:07

## 2023-09-09 RX ADMIN — POLYETHYLENE GLYCOL 3350 17 GRAM(S): 17 POWDER, FOR SOLUTION ORAL at 12:02

## 2023-09-09 RX ADMIN — BUDESONIDE AND FORMOTEROL FUMARATE DIHYDRATE 2 PUFF(S): 160; 4.5 AEROSOL RESPIRATORY (INHALATION) at 21:13

## 2023-09-09 RX ADMIN — Medication 3 MILLILITER(S): at 09:12

## 2023-09-09 RX ADMIN — Medication 40 MILLIGRAM(S): at 05:07

## 2023-09-09 RX ADMIN — APIXABAN 5 MILLIGRAM(S): 2.5 TABLET, FILM COATED ORAL at 05:07

## 2023-09-09 RX ADMIN — Medication 2: at 07:49

## 2023-09-09 RX ADMIN — ONDANSETRON 4 MILLIGRAM(S): 8 TABLET, FILM COATED ORAL at 06:32

## 2023-09-09 RX ADMIN — OXYCODONE HYDROCHLORIDE 5 MILLIGRAM(S): 5 TABLET ORAL at 21:00

## 2023-09-09 RX ADMIN — Medication 2000 UNIT(S): at 12:01

## 2023-09-09 RX ADMIN — Medication 1 DROP(S): at 05:09

## 2023-09-09 RX ADMIN — OXYCODONE HYDROCHLORIDE 5 MILLIGRAM(S): 5 TABLET ORAL at 20:00

## 2023-09-09 RX ADMIN — INSULIN GLARGINE 20 UNIT(S): 100 INJECTION, SOLUTION SUBCUTANEOUS at 08:01

## 2023-09-09 RX ADMIN — Medication 2: at 21:52

## 2023-09-09 RX ADMIN — Medication 3 MILLILITER(S): at 14:56

## 2023-09-09 RX ADMIN — POLYETHYLENE GLYCOL 3350 17 GRAM(S): 17 POWDER, FOR SOLUTION ORAL at 12:30

## 2023-09-09 RX ADMIN — CHLORHEXIDINE GLUCONATE 1 APPLICATION(S): 213 SOLUTION TOPICAL at 05:09

## 2023-09-09 RX ADMIN — Medication 81 MILLIGRAM(S): at 12:02

## 2023-09-09 RX ADMIN — Medication 0.25 MILLIGRAM(S): at 20:28

## 2023-09-09 RX ADMIN — KETOTIFEN FUMARATE 1 DROP(S): 0.34 SOLUTION OPHTHALMIC at 05:11

## 2023-09-09 RX ADMIN — ATORVASTATIN CALCIUM 40 MILLIGRAM(S): 80 TABLET, FILM COATED ORAL at 21:50

## 2023-09-09 RX ADMIN — NYSTATIN CREAM 1 APPLICATION(S): 100000 CREAM TOPICAL at 17:47

## 2023-09-09 RX ADMIN — ALBUTEROL 2.5 MILLIGRAM(S): 90 AEROSOL, METERED ORAL at 21:12

## 2023-09-09 RX ADMIN — BUDESONIDE AND FORMOTEROL FUMARATE DIHYDRATE 2 PUFF(S): 160; 4.5 AEROSOL RESPIRATORY (INHALATION) at 09:12

## 2023-09-09 RX ADMIN — OXYCODONE HYDROCHLORIDE 60 MILLIGRAM(S): 5 TABLET ORAL at 17:46

## 2023-09-09 RX ADMIN — Medication 1 APPLICATION(S): at 12:02

## 2023-09-09 RX ADMIN — Medication 1 DROP(S): at 17:46

## 2023-09-09 RX ADMIN — INSULIN GLARGINE 20 UNIT(S): 100 INJECTION, SOLUTION SUBCUTANEOUS at 21:50

## 2023-09-09 RX ADMIN — KETOTIFEN FUMARATE 1 DROP(S): 0.34 SOLUTION OPHTHALMIC at 17:47

## 2023-09-09 RX ADMIN — DULOXETINE HYDROCHLORIDE 30 MILLIGRAM(S): 30 CAPSULE, DELAYED RELEASE ORAL at 12:02

## 2023-09-09 RX ADMIN — Medication 4: at 16:50

## 2023-09-09 RX ADMIN — LORATADINE 10 MILLIGRAM(S): 10 TABLET ORAL at 12:02

## 2023-09-09 RX ADMIN — Medication 4: at 12:01

## 2023-09-09 RX ADMIN — OXYCODONE HYDROCHLORIDE 60 MILLIGRAM(S): 5 TABLET ORAL at 06:07

## 2023-09-09 NOTE — PROGRESS NOTE ADULT - ASSESSMENT
67 y/o morbidly obese female with h/o asthma on home o2, HFp EF, spinal stenosis, dm, ckd, , dvt lt. leg in 2004, treated and resolved came in for increasing sob for past 1 week. pt. stated that she has baseline chronic sob but last week was more than her baseline and weakness. As per pt. she gets oob and uses walker. In the ER  first trop 0.09 , d dimer 407, due to pt's weight she exceeds the wt. limit for ct. Cardiology following, continue diuresis, Heparin drip though low suspicion for clot however continues for potential ACS, unable to stress test, unable to perform CTA / VQ scan, LHC / RHC due to weight, Pulm following, severe Pulm HTN, vague h/o Asthma, patient is at baseline oxygenation, however could benefit still from further diuresis.     #Chronic HFpEF   ·  ECHO performed with 55% EF, moderate reduced RV function, severe RVSP elevation   - cardiology signed off  - c/w daily lasix   - euvolemic, no longer acutely exacerbated    #Elevated troponin.   unable to obtain Ischemic workup due to body habitus    #Pulmonary HTN   per ECHO RVSP severely elevated > 70, likely unable to obtain RHC given weight, possibly Type II/III   euvolemic  c/w daily lasix  c/w oxygen long term  symbicort w/ spacer BID    #Obesity hypoventilation syndrome   declining CPAP at this time    #Chronic respiratory failure with hypoxia  Multifactorial- restrictive lung disease 2/2 morbid obesity, HFpEF exacerbation / Severe pulm HTN  Pulm following, short course of nebs, wean to PRN, symbicort with spacer ordered given h/o asthma, with short course of medrol   - patient at baseline oxygen, no longer in acute respiratory failure    #JACQUES on CKD stage 3    - cr 2.03, improving, nearing baseline   avoid nephrotoxic meds, hold lisinopril for now  f/u renal fx  nephro on board    #DM (diabetes mellitus).   uncontrolled  SSI  c/w Lantus BID    #Chronic Edema   Lymphedema noted in B/L LE's, chronic changes of Abdomen with weeping while lifting pannus   Wound care following   Skin maceration in multiple fold areas, Dressing over L. groin wound under pannus     #Wound of foot.   rt. foot wound above 5th toe , recently got skin laceration, will keep on doxycycline  X ray foot shows no signs of foreign body / OM, nonspecific edema of foot   Podiatry consulted, however primarily chronic changes of both feet     #Morbid Obesity   Patient > 580 lbs, greatly impacting potential health care, unable to use CT imaging, NM stress, V/Q scan  Bariatric consult outpatient    #Chronic pain  on oxy ER 60mg Q12  PRN oxy IR 5mg Q8    #Anxiety  likely in the setting of chronic pain and chronic pulmonary disease  c/w xanax 0.25mg Q12  c/w cymbalta 30mg Q24    #Fall at home - Severely deconditioned - PT     DVT prophylaxis: eliquis Q12  Diet: DASH   Dispo: Awaiting Auth for [facility], patient does not want to go to [place] for [facility]. SW/CM aware

## 2023-09-10 LAB
GLUCOSE BLDC GLUCOMTR-MCNC: 184 MG/DL — HIGH (ref 70–99)
GLUCOSE BLDC GLUCOMTR-MCNC: 223 MG/DL — HIGH (ref 70–99)
GLUCOSE BLDC GLUCOMTR-MCNC: 262 MG/DL — HIGH (ref 70–99)
GLUCOSE BLDC GLUCOMTR-MCNC: 277 MG/DL — HIGH (ref 70–99)

## 2023-09-10 PROCEDURE — 99232 SBSQ HOSP IP/OBS MODERATE 35: CPT

## 2023-09-10 RX ADMIN — Medication 2000 UNIT(S): at 12:29

## 2023-09-10 RX ADMIN — Medication 2: at 07:58

## 2023-09-10 RX ADMIN — KETOTIFEN FUMARATE 1 DROP(S): 0.34 SOLUTION OPHTHALMIC at 05:29

## 2023-09-10 RX ADMIN — Medication 3 MILLILITER(S): at 14:57

## 2023-09-10 RX ADMIN — NYSTATIN CREAM 1 APPLICATION(S): 100000 CREAM TOPICAL at 05:23

## 2023-09-10 RX ADMIN — DULOXETINE HYDROCHLORIDE 30 MILLIGRAM(S): 30 CAPSULE, DELAYED RELEASE ORAL at 12:29

## 2023-09-10 RX ADMIN — Medication 1 DROP(S): at 05:28

## 2023-09-10 RX ADMIN — LORATADINE 10 MILLIGRAM(S): 10 TABLET ORAL at 12:29

## 2023-09-10 RX ADMIN — INSULIN GLARGINE 20 UNIT(S): 100 INJECTION, SOLUTION SUBCUTANEOUS at 21:40

## 2023-09-10 RX ADMIN — Medication 40 MILLIGRAM(S): at 05:24

## 2023-09-10 RX ADMIN — Medication 3 MILLILITER(S): at 20:27

## 2023-09-10 RX ADMIN — LACTULOSE 20 GRAM(S): 10 SOLUTION ORAL at 16:40

## 2023-09-10 RX ADMIN — APIXABAN 5 MILLIGRAM(S): 2.5 TABLET, FILM COATED ORAL at 05:24

## 2023-09-10 RX ADMIN — ATORVASTATIN CALCIUM 40 MILLIGRAM(S): 80 TABLET, FILM COATED ORAL at 21:42

## 2023-09-10 RX ADMIN — Medication 4: at 12:27

## 2023-09-10 RX ADMIN — OXYCODONE HYDROCHLORIDE 60 MILLIGRAM(S): 5 TABLET ORAL at 05:23

## 2023-09-10 RX ADMIN — Medication 81 MILLIGRAM(S): at 12:29

## 2023-09-10 RX ADMIN — Medication 2: at 21:41

## 2023-09-10 RX ADMIN — Medication 1 DROP(S): at 17:43

## 2023-09-10 RX ADMIN — OXYCODONE HYDROCHLORIDE 60 MILLIGRAM(S): 5 TABLET ORAL at 18:16

## 2023-09-10 RX ADMIN — CHLORHEXIDINE GLUCONATE 1 APPLICATION(S): 213 SOLUTION TOPICAL at 05:23

## 2023-09-10 RX ADMIN — CYCLOBENZAPRINE HYDROCHLORIDE 5 MILLIGRAM(S): 10 TABLET, FILM COATED ORAL at 17:43

## 2023-09-10 RX ADMIN — Medication 3 MILLILITER(S): at 08:41

## 2023-09-10 RX ADMIN — NYSTATIN CREAM 1 APPLICATION(S): 100000 CREAM TOPICAL at 17:42

## 2023-09-10 RX ADMIN — APIXABAN 5 MILLIGRAM(S): 2.5 TABLET, FILM COATED ORAL at 17:42

## 2023-09-10 RX ADMIN — Medication 6: at 16:42

## 2023-09-10 RX ADMIN — BUDESONIDE AND FORMOTEROL FUMARATE DIHYDRATE 2 PUFF(S): 160; 4.5 AEROSOL RESPIRATORY (INHALATION) at 08:41

## 2023-09-10 RX ADMIN — Medication 1 APPLICATION(S): at 12:30

## 2023-09-10 RX ADMIN — OXYCODONE HYDROCHLORIDE 5 MILLIGRAM(S): 5 TABLET ORAL at 22:30

## 2023-09-10 RX ADMIN — KETOTIFEN FUMARATE 1 DROP(S): 0.34 SOLUTION OPHTHALMIC at 17:43

## 2023-09-10 RX ADMIN — OXYCODONE HYDROCHLORIDE 60 MILLIGRAM(S): 5 TABLET ORAL at 06:23

## 2023-09-10 RX ADMIN — INSULIN GLARGINE 20 UNIT(S): 100 INJECTION, SOLUTION SUBCUTANEOUS at 08:17

## 2023-09-10 RX ADMIN — OXYCODONE HYDROCHLORIDE 5 MILLIGRAM(S): 5 TABLET ORAL at 21:41

## 2023-09-10 RX ADMIN — BUDESONIDE AND FORMOTEROL FUMARATE DIHYDRATE 2 PUFF(S): 160; 4.5 AEROSOL RESPIRATORY (INHALATION) at 20:28

## 2023-09-10 RX ADMIN — OXYCODONE HYDROCHLORIDE 60 MILLIGRAM(S): 5 TABLET ORAL at 17:42

## 2023-09-10 NOTE — PROGRESS NOTE ADULT - ASSESSMENT
67 y/o morbidly obese female with h/o asthma on home o2, HFp EF, spinal stenosis, dm, ckd, , dvt lt. leg in 2004, treated and resolved came in for increasing sob for past 1 week. pt. stated that she has baseline chronic sob but last week was more than her baseline and weakness. As per pt. she gets oob and uses walker. In the ER  first trop 0.09 , d dimer 407, due to pt's weight she exceeds the wt. limit for ct. Cardiology following, continue diuresis, Heparin drip though low suspicion for clot however continues for potential ACS, unable to stress test, unable to perform CTA / VQ scan, LHC / RHC due to weight, Pulm following, severe Pulm HTN, vague h/o Asthma, patient is at baseline oxygenation, however could benefit still from further diuresis.     #Chronic HFpEF   ·  ECHO performed with 55% EF, moderate reduced RV function, severe RVSP elevation   - cardiology signed off  - c/w daily lasix   - euvolemic, no longer acutely exacerbated    #Elevated troponin.   unable to obtain Ischemic workup due to body habitus    #Pulmonary HTN   per ECHO RVSP severely elevated > 70, likely unable to obtain RHC given weight, possibly Type II/III   euvolemic  c/w daily lasix  c/w oxygen long term  symbicort w/ spacer BID    #Obesity hypoventilation syndrome   declining CPAP at this time    #Chronic respiratory failure with hypoxia  Multifactorial- restrictive lung disease 2/2 morbid obesity, HFpEF exacerbation / Severe pulm HTN  Pulm following, short course of nebs, wean to PRN, symbicort with spacer ordered given h/o asthma, with short course of medrol   - patient at baseline oxygen, no longer in acute respiratory failure    #JACQUES on CKD stage 3    - cr 2.03, improving, nearing baseline   avoid nephrotoxic meds, hold lisinopril for now  f/u renal fx  nephro on board    #DM (diabetes mellitus).   uncontrolled  SSI  c/w Lantus BID    #Chronic Edema   Lymphedema noted in B/L LE's, chronic changes of Abdomen with weeping while lifting pannus   Wound care following   Skin maceration in multiple fold areas, Dressing over L. groin wound under pannus     #Wound of foot.   rt. foot wound above 5th toe , recently got skin laceration, will keep on doxycycline  X ray foot shows no signs of foreign body / OM, nonspecific edema of foot   Podiatry consulted, however primarily chronic changes of both feet     #Morbid Obesity   Patient > 580 lbs, greatly impacting potential health care, unable to use CT imaging, NM stress, V/Q scan  Bariatric consult outpatient    #Chronic pain  on oxy ER 60mg Q12  PRN oxy IR 5mg Q8    #Anxiety  likely in the setting of chronic pain and chronic pulmonary disease  c/w xanax 0.25mg Q12  c/w cymbalta 30mg Q24    #Fall at home - Severely deconditioned - PT     DVT prophylaxis: eliquis Q12  Diet: DASH   Dispo: Awaiting Auth for [facility], patient does not want to go to [place] for [facility]. SW/CM aware

## 2023-09-11 LAB
GLUCOSE BLDC GLUCOMTR-MCNC: 213 MG/DL — HIGH (ref 70–99)
GLUCOSE BLDC GLUCOMTR-MCNC: 214 MG/DL — HIGH (ref 70–99)
GLUCOSE BLDC GLUCOMTR-MCNC: 230 MG/DL — HIGH (ref 70–99)
GLUCOSE BLDC GLUCOMTR-MCNC: 276 MG/DL — HIGH (ref 70–99)
HCT VFR BLD CALC: 41.5 % — SIGNIFICANT CHANGE UP (ref 34.5–45)
HGB BLD-MCNC: 12.2 G/DL — SIGNIFICANT CHANGE UP (ref 11.5–15.5)
MCHC RBC-ENTMCNC: 27.5 PG — SIGNIFICANT CHANGE UP (ref 27–34)
MCHC RBC-ENTMCNC: 29.4 GM/DL — LOW (ref 32–36)
MCV RBC AUTO: 93.7 FL — SIGNIFICANT CHANGE UP (ref 80–100)
PLATELET # BLD AUTO: 163 K/UL — SIGNIFICANT CHANGE UP (ref 150–400)
RBC # BLD: 4.43 M/UL — SIGNIFICANT CHANGE UP (ref 3.8–5.2)
RBC # FLD: 15.9 % — HIGH (ref 10.3–14.5)
WBC # BLD: 9.5 K/UL — SIGNIFICANT CHANGE UP (ref 3.8–10.5)
WBC # FLD AUTO: 9.5 K/UL — SIGNIFICANT CHANGE UP (ref 3.8–10.5)

## 2023-09-11 PROCEDURE — 99233 SBSQ HOSP IP/OBS HIGH 50: CPT

## 2023-09-11 RX ORDER — INSULIN LISPRO 100/ML
8 VIAL (ML) SUBCUTANEOUS
Refills: 0 | Status: DISCONTINUED | OUTPATIENT
Start: 2023-09-11 | End: 2023-09-13

## 2023-09-11 RX ADMIN — CHLORHEXIDINE GLUCONATE 1 APPLICATION(S): 213 SOLUTION TOPICAL at 05:34

## 2023-09-11 RX ADMIN — Medication 6: at 16:50

## 2023-09-11 RX ADMIN — Medication 8 UNIT(S): at 16:50

## 2023-09-11 RX ADMIN — Medication 3 MILLILITER(S): at 21:28

## 2023-09-11 RX ADMIN — BUDESONIDE AND FORMOTEROL FUMARATE DIHYDRATE 2 PUFF(S): 160; 4.5 AEROSOL RESPIRATORY (INHALATION) at 09:58

## 2023-09-11 RX ADMIN — ATORVASTATIN CALCIUM 40 MILLIGRAM(S): 80 TABLET, FILM COATED ORAL at 21:43

## 2023-09-11 RX ADMIN — Medication 3 MILLILITER(S): at 02:48

## 2023-09-11 RX ADMIN — Medication 3 MILLILITER(S): at 09:56

## 2023-09-11 RX ADMIN — OXYCODONE HYDROCHLORIDE 5 MILLIGRAM(S): 5 TABLET ORAL at 20:39

## 2023-09-11 RX ADMIN — APIXABAN 5 MILLIGRAM(S): 2.5 TABLET, FILM COATED ORAL at 05:34

## 2023-09-11 RX ADMIN — OXYCODONE HYDROCHLORIDE 60 MILLIGRAM(S): 5 TABLET ORAL at 05:34

## 2023-09-11 RX ADMIN — OXYCODONE HYDROCHLORIDE 5 MILLIGRAM(S): 5 TABLET ORAL at 21:25

## 2023-09-11 RX ADMIN — DULOXETINE HYDROCHLORIDE 30 MILLIGRAM(S): 30 CAPSULE, DELAYED RELEASE ORAL at 11:07

## 2023-09-11 RX ADMIN — OXYCODONE HYDROCHLORIDE 5 MILLIGRAM(S): 5 TABLET ORAL at 12:49

## 2023-09-11 RX ADMIN — INSULIN GLARGINE 20 UNIT(S): 100 INJECTION, SOLUTION SUBCUTANEOUS at 21:44

## 2023-09-11 RX ADMIN — POLYETHYLENE GLYCOL 3350 17 GRAM(S): 17 POWDER, FOR SOLUTION ORAL at 11:07

## 2023-09-11 RX ADMIN — APIXABAN 5 MILLIGRAM(S): 2.5 TABLET, FILM COATED ORAL at 17:32

## 2023-09-11 RX ADMIN — Medication 1 APPLICATION(S): at 11:07

## 2023-09-11 RX ADMIN — OXYCODONE HYDROCHLORIDE 60 MILLIGRAM(S): 5 TABLET ORAL at 17:32

## 2023-09-11 RX ADMIN — KETOTIFEN FUMARATE 1 DROP(S): 0.34 SOLUTION OPHTHALMIC at 05:35

## 2023-09-11 RX ADMIN — LORATADINE 10 MILLIGRAM(S): 10 TABLET ORAL at 11:07

## 2023-09-11 RX ADMIN — Medication 4: at 11:08

## 2023-09-11 RX ADMIN — Medication 1 DROP(S): at 05:34

## 2023-09-11 RX ADMIN — Medication 8 UNIT(S): at 11:08

## 2023-09-11 RX ADMIN — Medication 40 MILLIGRAM(S): at 05:34

## 2023-09-11 RX ADMIN — Medication 81 MILLIGRAM(S): at 11:07

## 2023-09-11 RX ADMIN — KETOTIFEN FUMARATE 1 DROP(S): 0.34 SOLUTION OPHTHALMIC at 17:34

## 2023-09-11 RX ADMIN — Medication 2000 UNIT(S): at 11:07

## 2023-09-11 RX ADMIN — Medication 3 MILLILITER(S): at 16:39

## 2023-09-11 RX ADMIN — NYSTATIN CREAM 1 APPLICATION(S): 100000 CREAM TOPICAL at 05:36

## 2023-09-11 RX ADMIN — Medication 4: at 07:53

## 2023-09-11 RX ADMIN — NYSTATIN CREAM 1 APPLICATION(S): 100000 CREAM TOPICAL at 17:32

## 2023-09-11 RX ADMIN — Medication 1 DROP(S): at 17:34

## 2023-09-11 RX ADMIN — OXYCODONE HYDROCHLORIDE 60 MILLIGRAM(S): 5 TABLET ORAL at 06:10

## 2023-09-11 RX ADMIN — INSULIN GLARGINE 20 UNIT(S): 100 INJECTION, SOLUTION SUBCUTANEOUS at 07:54

## 2023-09-11 RX ADMIN — BUDESONIDE AND FORMOTEROL FUMARATE DIHYDRATE 2 PUFF(S): 160; 4.5 AEROSOL RESPIRATORY (INHALATION) at 21:29

## 2023-09-11 RX ADMIN — OXYCODONE HYDROCHLORIDE 5 MILLIGRAM(S): 5 TABLET ORAL at 13:50

## 2023-09-11 NOTE — CHART NOTE - NSCHARTNOTEFT_GEN_A_CORE
Source: Patient [ x]  Family [ ]   other [ ]    Current Diet:   Diet, DASH/TLC:   Sodium & Cholesterol Restricted  Consistent Carbohydrate {No Snacks} (CSTCHO)  1000mL Fluid Restriction ([id number]) (09-03-23 @ 16:41)    Patient reports [ ] nausea  [ ] vomiting [ ] diarrhea [ ] constipation  [ ]chewing problems [ ] swallowing issues  [ ] other: denies    PO intake:  < 50% [ ]   50-75%  [ ]   %  [x ]  other :    Source for PO intake [ x] Patient [ ] family [ ] chart [ ] staff [ ] other    Current Weight:   (9/6)  444.6 lbs    % Weight Change: No recent weight documented     Pertinent Medications: MEDICATIONS  (STANDING):  albuterol/ipratropium for Nebulization 3 milliLiter(s) Nebulizer every 6 hours  apixaban 5 milliGRAM(s) Oral every 12 hours  artificial  tears Solution 1 Drop(s) Both EYES two times a day  aspirin enteric coated 81 milliGRAM(s) Oral daily  atorvastatin 40 milliGRAM(s) Oral at bedtime  budesonide 160 MICROgram(s)/formoterol 4.5 MICROgram(s) Inhaler 2 Puff(s) Inhalation two times a day  cadexomer iodine 0.9% Gel 1 Application(s) Topical daily  chlorhexidine 2% Cloths 1 Application(s) Topical <User Schedule>  cholecalciferol 2000 Unit(s) Oral daily  dextrose 5%. 1000 milliLiter(s) (50 mL/Hr) IV Continuous <Continuous>  dextrose 5%. 1000 milliLiter(s) (100 mL/Hr) IV Continuous <Continuous>  dextrose 50% Injectable 25 Gram(s) IV Push once  dextrose 50% Injectable 12.5 Gram(s) IV Push once  dextrose 50% Injectable 25 Gram(s) IV Push once  DULoxetine 30 milliGRAM(s) Oral daily  furosemide    Tablet 40 milliGRAM(s) Oral daily  glucagon  Injectable 1 milliGRAM(s) IntraMuscular once  insulin glargine Injectable (LANTUS) 20 Unit(s) SubCutaneous at bedtime  insulin glargine Injectable (LANTUS) 20 Unit(s) SubCutaneous every morning  insulin lispro (ADMELOG) corrective regimen sliding scale   SubCutaneous at bedtime  insulin lispro (ADMELOG) corrective regimen sliding scale   SubCutaneous three times a day before meals  insulin lispro Injectable (ADMELOG) 8 Unit(s) SubCutaneous three times a day before meals  ketotifen 0.025% Ophthalmic Solution 1 Drop(s) Both EYES two times a day  loratadine 10 milliGRAM(s) Oral daily  nystatin Powder 1 Application(s) Topical every 12 hours  oxyCODONE  ER Tablet 60 milliGRAM(s) Oral every 12 hours  polyethylene glycol 3350 17 Gram(s) Oral daily    MEDICATIONS  (PRN):  albuterol    0.083% 2.5 milliGRAM(s) Nebulizer every 4 hours PRN Shortness of Breath and/or Wheezing  cyclobenzaprine 5 milliGRAM(s) Oral three times a day PRN Muscle Spasm  dextrose Oral Gel 15 Gram(s) Oral once PRN Blood Glucose LESS THAN 70 milliGRAM(s)/deciliter  lactulose Syrup 20 Gram(s) Oral every 8 hours PRN Constipation  oxyCODONE    IR 5 milliGRAM(s) Oral every 8 hours PRN Severe Pain (7 - 10)    Pertinent Labs: CBC Full  -  ( 11 Sep 2023 05:18 )  WBC Count : 9.50 K/uL  RBC Count : 4.43 M/uL  Hemoglobin : 12.2 g/dL  Hematocrit : 41.5 %  Platelet Count - Automated : 163 K/uL  Mean Cell Volume : 93.7 fl  Mean Cell Hemoglobin : 27.5 pg  Mean Cell Hemoglobin Concentration : 29.4 gm/dL    Skin:     Nutrition focused physical exam conducted - found signs of malnutrition [ ]absent [ ]present    Subcutaneous fat loss: [ ] Orbital fat pads region, [ ]Buccal fat region, [ ]Triceps region,  [ ]Ribs region    Muscle wasting: [ ]Temples region, [ ]Clavicle region, [ ]Shoulder region, [ ]Scapula region, [ ]Interosseous region,  [ ]thigh region, [ ]Calf region    Estimated Needs:   [ ] no change since previous assessment  [ ] recalculated:     Current Nutrition Diagnosis:    Recommendations:     Monitoring and Evaluation:   [ ] PO intake [ ] Tolerance to diet prescription [X] Weights  [X] Follow up per protocol [X] Labs: Source: Patient [ x]  Family [ ]   other [ ]    Current Diet:   Diet, DASH/TLC:   Sodium & Cholesterol Restricted  Consistent Carbohydrate {No Snacks} (CSTCHO)  1000mL Fluid Restriction ([id number]) (09-03-23 @ 16:41)    Patient reports [ ] nausea  [ ] vomiting [ ] diarrhea [ ] constipation  [ ]chewing problems [ ] swallowing issues  [ ] other: denies    PO intake:  < 50% [ ]   50-75%  [ ]   %  [x ]  other :    Source for PO intake [ x] Patient [ ] family [ ] chart [ ] staff [ ] other    Current Weight:   (9/6)  444.6 lbs    % Weight Change: No recent weight documented     Pertinent Medications: MEDICATIONS  (STANDING):  albuterol/ipratropium for Nebulization 3 milliLiter(s) Nebulizer every 6 hours  apixaban 5 milliGRAM(s) Oral every 12 hours  artificial  tears Solution 1 Drop(s) Both EYES two times a day  aspirin enteric coated 81 milliGRAM(s) Oral daily  atorvastatin 40 milliGRAM(s) Oral at bedtime  budesonide 160 MICROgram(s)/formoterol 4.5 MICROgram(s) Inhaler 2 Puff(s) Inhalation two times a day  cadexomer iodine 0.9% Gel 1 Application(s) Topical daily  chlorhexidine 2% Cloths 1 Application(s) Topical <User Schedule>  cholecalciferol 2000 Unit(s) Oral daily  dextrose 5%. 1000 milliLiter(s) (50 mL/Hr) IV Continuous <Continuous>  dextrose 5%. 1000 milliLiter(s) (100 mL/Hr) IV Continuous <Continuous>  dextrose 50% Injectable 25 Gram(s) IV Push once  dextrose 50% Injectable 12.5 Gram(s) IV Push once  dextrose 50% Injectable 25 Gram(s) IV Push once  DULoxetine 30 milliGRAM(s) Oral daily  furosemide    Tablet 40 milliGRAM(s) Oral daily  glucagon  Injectable 1 milliGRAM(s) IntraMuscular once  insulin glargine Injectable (LANTUS) 20 Unit(s) SubCutaneous at bedtime  insulin glargine Injectable (LANTUS) 20 Unit(s) SubCutaneous every morning  insulin lispro (ADMELOG) corrective regimen sliding scale   SubCutaneous at bedtime  insulin lispro (ADMELOG) corrective regimen sliding scale   SubCutaneous three times a day before meals  insulin lispro Injectable (ADMELOG) 8 Unit(s) SubCutaneous three times a day before meals  ketotifen 0.025% Ophthalmic Solution 1 Drop(s) Both EYES two times a day  loratadine 10 milliGRAM(s) Oral daily  nystatin Powder 1 Application(s) Topical every 12 hours  oxyCODONE  ER Tablet 60 milliGRAM(s) Oral every 12 hours  polyethylene glycol 3350 17 Gram(s) Oral daily    MEDICATIONS  (PRN):  albuterol    0.083% 2.5 milliGRAM(s) Nebulizer every 4 hours PRN Shortness of Breath and/or Wheezing  cyclobenzaprine 5 milliGRAM(s) Oral three times a day PRN Muscle Spasm  dextrose Oral Gel 15 Gram(s) Oral once PRN Blood Glucose LESS THAN 70 milliGRAM(s)/deciliter  lactulose Syrup 20 Gram(s) Oral every 8 hours PRN Constipation  oxyCODONE    IR 5 milliGRAM(s) Oral every 8 hours PRN Severe Pain (7 - 10)    Pertinent Labs: CBC Full  -  ( 11 Sep 2023 05:18 )  WBC Count : 9.50 K/uL  RBC Count : 4.43 M/uL  Hemoglobin : 12.2 g/dL  Hematocrit : 41.5 %  Platelet Count - Automated : 163 K/uL  Mean Cell Volume : 93.7 fl  Mean Cell Hemoglobin : 27.5 pg  Mean Cell Hemoglobin Concentration : 29.4 gm/dL    Skin: L. heel DM ulcer, IAD/MAD to coccyx    Nutrition focused physical exam conducted - found signs of malnutrition [x ]absent [ ]present    Subcutaneous fat loss: [ ] Orbital fat pads region, [ ]Buccal fat region, [ ]Triceps region,  [ ]Ribs region    Muscle wasting: [ ]Temples region, [ ]Clavicle region, [ ]Shoulder region, [ ]Scapula region, [ ]Interosseous region,  [ ]thigh region, [ ]Calf region    Estimated Needs:   [ x] no change since previous assessment  [ ] recalculated:     Current Nutrition Diagnosis: Pt remains at nutrition risk secondary to overweight/obesity related to      Recommendations:     Monitoring and Evaluation:   [ ] PO intake [ ] Tolerance to diet prescription [X] Weights  [X] Follow up per protocol [X] Labs: Source: Patient [ x]  Family [ ]   other [ ]    Current Diet:   Diet, DASH/TLC:   Sodium & Cholesterol Restricted  Consistent Carbohydrate {No Snacks} (CSTCHO)  1000mL Fluid Restriction ([id number]) (09-03-23 @ 16:41)    Patient reports [ ] nausea  [ ] vomiting [ ] diarrhea [ ] constipation  [ ]chewing problems [ ] swallowing issues  [ ] other: denies    PO intake:  < 50% [ ]   50-75%  [ ]   %  [x ]  other :    Source for PO intake [ x] Patient [ ] family [ ] chart [ ] staff [ ] other    Current Weight:   (9/6)  444.6 lbs    % Weight Change: No recent weight documented, recent weight appears accurate     Pertinent Medications: MEDICATIONS  (STANDING):  albuterol/ipratropium for Nebulization 3 milliLiter(s) Nebulizer every 6 hours  apixaban 5 milliGRAM(s) Oral every 12 hours  artificial  tears Solution 1 Drop(s) Both EYES two times a day  aspirin enteric coated 81 milliGRAM(s) Oral daily  atorvastatin 40 milliGRAM(s) Oral at bedtime  budesonide 160 MICROgram(s)/formoterol 4.5 MICROgram(s) Inhaler 2 Puff(s) Inhalation two times a day  cadexomer iodine 0.9% Gel 1 Application(s) Topical daily  chlorhexidine 2% Cloths 1 Application(s) Topical <User Schedule>  cholecalciferol 2000 Unit(s) Oral daily  dextrose 5%. 1000 milliLiter(s) (50 mL/Hr) IV Continuous <Continuous>  dextrose 5%. 1000 milliLiter(s) (100 mL/Hr) IV Continuous <Continuous>  dextrose 50% Injectable 25 Gram(s) IV Push once  dextrose 50% Injectable 12.5 Gram(s) IV Push once  dextrose 50% Injectable 25 Gram(s) IV Push once  DULoxetine 30 milliGRAM(s) Oral daily  furosemide    Tablet 40 milliGRAM(s) Oral daily  glucagon  Injectable 1 milliGRAM(s) IntraMuscular once  insulin glargine Injectable (LANTUS) 20 Unit(s) SubCutaneous at bedtime  insulin glargine Injectable (LANTUS) 20 Unit(s) SubCutaneous every morning  insulin lispro (ADMELOG) corrective regimen sliding scale   SubCutaneous at bedtime  insulin lispro (ADMELOG) corrective regimen sliding scale   SubCutaneous three times a day before meals  insulin lispro Injectable (ADMELOG) 8 Unit(s) SubCutaneous three times a day before meals  ketotifen 0.025% Ophthalmic Solution 1 Drop(s) Both EYES two times a day  loratadine 10 milliGRAM(s) Oral daily  nystatin Powder 1 Application(s) Topical every 12 hours  oxyCODONE  ER Tablet 60 milliGRAM(s) Oral every 12 hours  polyethylene glycol 3350 17 Gram(s) Oral daily    MEDICATIONS  (PRN):  albuterol    0.083% 2.5 milliGRAM(s) Nebulizer every 4 hours PRN Shortness of Breath and/or Wheezing  cyclobenzaprine 5 milliGRAM(s) Oral three times a day PRN Muscle Spasm  dextrose Oral Gel 15 Gram(s) Oral once PRN Blood Glucose LESS THAN 70 milliGRAM(s)/deciliter  lactulose Syrup 20 Gram(s) Oral every 8 hours PRN Constipation  oxyCODONE    IR 5 milliGRAM(s) Oral every 8 hours PRN Severe Pain (7 - 10)    Pertinent Labs: CBC Full  -  ( 11 Sep 2023 05:18 )  WBC Count : 9.50 K/uL  RBC Count : 4.43 M/uL  Hemoglobin : 12.2 g/dL  Hematocrit : 41.5 %  Platelet Count - Automated : 163 K/uL  Mean Cell Volume : 93.7 fl  Mean Cell Hemoglobin : 27.5 pg  Mean Cell Hemoglobin Concentration : 29.4 gm/dL    Skin: L. heel DM ulcer, IAD/MAD to coccyx    Nutrition focused physical exam conducted - found signs of malnutrition [x ]absent [ ]present    Subcutaneous fat loss: [ ] Orbital fat pads region, [ ]Buccal fat region, [ ]Triceps region,  [ ]Ribs region    Muscle wasting: [ ]Temples region, [ ]Clavicle region, [ ]Shoulder region, [ ]Scapula region, [ ]Interosseous region,  [ ]thigh region, [ ]Calf region    Estimated Needs:   [ x] no change since previous assessment  [ ] recalculated:     Current Nutrition Diagnosis: Pt remains at nutrition risk secondary to overweight/obesity related to energy intake likely exceeds energy expenditure as evidenced by BMI 78.8.     Recommendations:     Monitoring and Evaluation:   [ ] PO intake [ ] Tolerance to diet prescription [X] Weights  [X] Follow up per protocol [X] Labs: Source: Patient [ x]  Family [ ]   other [ ]    Current Diet:   Diet, DASH/TLC:   Sodium & Cholesterol Restricted  Consistent Carbohydrate {No Snacks} (CSTCHO)  1000mL Fluid Restriction ([id number]) (09-03-23 @ 16:41)    Patient reports [ ] nausea  [ ] vomiting [ ] diarrhea [ ] constipation  [ ]chewing problems [ ] swallowing issues  [ ] other: denies    PO intake:  < 50% [ ]   50-75%  [ ]   %  [x ]  other :    Source for PO intake [ x] Patient [ ] family [ ] chart [ ] staff [ ] other    Current Weight:   (9/6)  444.6 lbs    % Weight Change: No recent weight documented, recent weight appears accurate     Pertinent Medications: MEDICATIONS  (STANDING):  albuterol/ipratropium for Nebulization 3 milliLiter(s) Nebulizer every 6 hours  apixaban 5 milliGRAM(s) Oral every 12 hours  artificial  tears Solution 1 Drop(s) Both EYES two times a day  aspirin enteric coated 81 milliGRAM(s) Oral daily  atorvastatin 40 milliGRAM(s) Oral at bedtime  budesonide 160 MICROgram(s)/formoterol 4.5 MICROgram(s) Inhaler 2 Puff(s) Inhalation two times a day  cadexomer iodine 0.9% Gel 1 Application(s) Topical daily  chlorhexidine 2% Cloths 1 Application(s) Topical <User Schedule>  cholecalciferol 2000 Unit(s) Oral daily  dextrose 5%. 1000 milliLiter(s) (50 mL/Hr) IV Continuous <Continuous>  dextrose 5%. 1000 milliLiter(s) (100 mL/Hr) IV Continuous <Continuous>  dextrose 50% Injectable 25 Gram(s) IV Push once  dextrose 50% Injectable 12.5 Gram(s) IV Push once  dextrose 50% Injectable 25 Gram(s) IV Push once  DULoxetine 30 milliGRAM(s) Oral daily  furosemide    Tablet 40 milliGRAM(s) Oral daily  glucagon  Injectable 1 milliGRAM(s) IntraMuscular once  insulin glargine Injectable (LANTUS) 20 Unit(s) SubCutaneous at bedtime  insulin glargine Injectable (LANTUS) 20 Unit(s) SubCutaneous every morning  insulin lispro (ADMELOG) corrective regimen sliding scale   SubCutaneous at bedtime  insulin lispro (ADMELOG) corrective regimen sliding scale   SubCutaneous three times a day before meals  insulin lispro Injectable (ADMELOG) 8 Unit(s) SubCutaneous three times a day before meals  ketotifen 0.025% Ophthalmic Solution 1 Drop(s) Both EYES two times a day  loratadine 10 milliGRAM(s) Oral daily  nystatin Powder 1 Application(s) Topical every 12 hours  oxyCODONE  ER Tablet 60 milliGRAM(s) Oral every 12 hours  polyethylene glycol 3350 17 Gram(s) Oral daily    MEDICATIONS  (PRN):  albuterol    0.083% 2.5 milliGRAM(s) Nebulizer every 4 hours PRN Shortness of Breath and/or Wheezing  cyclobenzaprine 5 milliGRAM(s) Oral three times a day PRN Muscle Spasm  dextrose Oral Gel 15 Gram(s) Oral once PRN Blood Glucose LESS THAN 70 milliGRAM(s)/deciliter  lactulose Syrup 20 Gram(s) Oral every 8 hours PRN Constipation  oxyCODONE    IR 5 milliGRAM(s) Oral every 8 hours PRN Severe Pain (7 - 10)    Pertinent Labs: CBC Full  -  ( 11 Sep 2023 05:18 )  WBC Count : 9.50 K/uL  RBC Count : 4.43 M/uL  Hemoglobin : 12.2 g/dL  Hematocrit : 41.5 %  Platelet Count - Automated : 163 K/uL  Mean Cell Volume : 93.7 fl  Mean Cell Hemoglobin : 27.5 pg  Mean Cell Hemoglobin Concentration : 29.4 gm/dL    Skin: L. heel DM ulcer, IAD/MAD to coccyx  Edema: 4+ generalized edema,4+ R/L legs     Nutrition focused physical exam conducted - found signs of malnutrition [x ]absent [ ]present    Subcutaneous fat loss: [ ] Orbital fat pads region, [ ]Buccal fat region, [ ]Triceps region,  [ ]Ribs region    Muscle wasting: [ ]Temples region, [ ]Clavicle region, [ ]Shoulder region, [ ]Scapula region, [ ]Interosseous region,  [ ]thigh region, [ ]Calf region    Estimated Needs:   [ x] no change since previous assessment  [ ] recalculated:     Current Nutrition Diagnosis: Pt remains at nutrition risk secondary to overweight/obesity related to energy intake likely exceeds energy expenditure as evidenced by BMI 78.8. Pt reports eating well, completing >75% of meals. Aware bariatric consult completed, pending currently pending auth for [facility]. Pt states she's been provided nutrition education, declined additional education/questions at this time.      Recommendations:   1) Rx MVI and vit C 500mg daily   2) Continue current nutrition regimen as tolerated   3) Monitor weights daily for trend/accuracy   4) Encourage HBV protein sources     Monitoring and Evaluation:   [x ] PO intake [x] Tolerance to diet prescription [X] Weights  [X] Follow up per protocol [X] Labs:

## 2023-09-11 NOTE — PROGRESS NOTE ADULT - ASSESSMENT
67 y/o morbidly obese female with h/o asthma on home o2, HFp EF, spinal stenosis, dm, ckd, , dvt lt. leg in 2004, treated and resolved came in for increasing sob for past 1 week. pt. stated that she has baseline chronic sob but last week was more than her baseline and weakness. As per pt. she gets oob and uses walker. In the ER  first trop 0.09 , d dimer 407, due to pt's weight she exceeds the wt. limit for ct. Cardiology following, continue diuresis, Heparin drip though low suspicion for clot however continues for potential ACS, unable to stress test, unable to perform CTA / VQ scan, LHC / RHC due to weight, Pulm following, severe Pulm HTN, vague h/o Asthma, patient is at baseline oxygenation. Was diuresed.     #Chronic HFpEF   ·  ECHO performed with 55% EF, moderate reduced RV function, severe RVSP elevation   - cardiology signed off  - c/w daily lasix   - euvolemic, no longer acutely exacerbated    #Elevated troponin.   unable to obtain Ischemic workup due to body habitus    #Pulmonary HTN   per ECHO RVSP severely elevated > 70, likely unable to obtain RHC given weight, possibly Type II/III   euvolemic  c/w daily lasix  c/w oxygen long term  symbicort w/ spacer BID    #Obesity hypoventilation syndrome   declining CPAP at this time    #Chronic respiratory failure with hypoxia  Multifactorial- restrictive lung disease 2/2 morbid obesity, HFpEF exacerbation / Severe pulm HTN  Pulm following, short course of nebs, wean to PRN, symbicort with spacer ordered given h/o asthma, with short course of medrol   - patient at baseline oxygen, no longer in acute respiratory failure    #JACQUES on CKD stage 3    - cr 2.03, improving, nearing baseline   avoid nephrotoxic meds, hold lisinopril for now  f/u renal fx  nephro on board    #DM (diabetes mellitus).   uncontrolled  SSI  c/w Lantus BID    #Chronic Edema   Lymphedema noted in B/L LE's, chronic changes of Abdomen with weeping while lifting pannus   Wound care following   Skin maceration in multiple fold areas, Dressing over L. groin wound under pannus     #Wound of foot.   rt. foot wound above 5th toe , recently got skin laceration, will keep on doxycycline  X ray foot shows no signs of foreign body / OM, nonspecific edema of foot   Podiatry consulted, however primarily chronic changes of both feet     #Morbid Obesity   Patient > 580 lbs, greatly impacting potential health care, unable to use CT imaging, NM stress, V/Q scan  Bariatric consult outpatient    #Chronic pain  on oxy ER 60mg Q12  PRN oxy IR 5mg Q8    #Anxiety  likely in the setting of chronic pain and chronic pulmonary disease  c/w xanax 0.25mg Q12  c/w cymbalta 30mg Q24    #Fall at home - Severely deconditioned - PT     DVT prophylaxis: eliquis Q12  Diet: DASH   Dispo: Awaiting Auth for RO[facility]

## 2023-09-12 LAB
GLUCOSE BLDC GLUCOMTR-MCNC: 138 MG/DL — HIGH (ref 70–99)
GLUCOSE BLDC GLUCOMTR-MCNC: 172 MG/DL — HIGH (ref 70–99)
GLUCOSE BLDC GLUCOMTR-MCNC: 202 MG/DL — HIGH (ref 70–99)
GLUCOSE BLDC GLUCOMTR-MCNC: 222 MG/DL — HIGH (ref 70–99)
HCT VFR BLD CALC: 38 % — SIGNIFICANT CHANGE UP (ref 34.5–45)
HGB BLD-MCNC: 11.9 G/DL — SIGNIFICANT CHANGE UP (ref 11.5–15.5)
MCHC RBC-ENTMCNC: 28.7 PG — SIGNIFICANT CHANGE UP (ref 27–34)
MCHC RBC-ENTMCNC: 31.3 GM/DL — LOW (ref 32–36)
MCV RBC AUTO: 91.8 FL — SIGNIFICANT CHANGE UP (ref 80–100)
PLATELET # BLD AUTO: 166 K/UL — SIGNIFICANT CHANGE UP (ref 150–400)
RBC # BLD: 4.14 M/UL — SIGNIFICANT CHANGE UP (ref 3.8–5.2)
RBC # FLD: 15.8 % — HIGH (ref 10.3–14.5)
SARS-COV-2 RNA SPEC QL NAA+PROBE: SIGNIFICANT CHANGE UP
WBC # BLD: 9.04 K/UL — SIGNIFICANT CHANGE UP (ref 3.8–10.5)
WBC # FLD AUTO: 9.04 K/UL — SIGNIFICANT CHANGE UP (ref 3.8–10.5)

## 2023-09-12 PROCEDURE — 99233 SBSQ HOSP IP/OBS HIGH 50: CPT

## 2023-09-12 RX ADMIN — APIXABAN 5 MILLIGRAM(S): 2.5 TABLET, FILM COATED ORAL at 17:15

## 2023-09-12 RX ADMIN — Medication 81 MILLIGRAM(S): at 11:17

## 2023-09-12 RX ADMIN — Medication 2: at 07:48

## 2023-09-12 RX ADMIN — LORATADINE 10 MILLIGRAM(S): 10 TABLET ORAL at 11:17

## 2023-09-12 RX ADMIN — CHLORHEXIDINE GLUCONATE 1 APPLICATION(S): 213 SOLUTION TOPICAL at 05:33

## 2023-09-12 RX ADMIN — ATORVASTATIN CALCIUM 40 MILLIGRAM(S): 80 TABLET, FILM COATED ORAL at 22:07

## 2023-09-12 RX ADMIN — POLYETHYLENE GLYCOL 3350 17 GRAM(S): 17 POWDER, FOR SOLUTION ORAL at 11:17

## 2023-09-12 RX ADMIN — OXYCODONE HYDROCHLORIDE 5 MILLIGRAM(S): 5 TABLET ORAL at 06:32

## 2023-09-12 RX ADMIN — Medication 4: at 11:17

## 2023-09-12 RX ADMIN — BUDESONIDE AND FORMOTEROL FUMARATE DIHYDRATE 2 PUFF(S): 160; 4.5 AEROSOL RESPIRATORY (INHALATION) at 08:58

## 2023-09-12 RX ADMIN — APIXABAN 5 MILLIGRAM(S): 2.5 TABLET, FILM COATED ORAL at 05:32

## 2023-09-12 RX ADMIN — OXYCODONE HYDROCHLORIDE 60 MILLIGRAM(S): 5 TABLET ORAL at 17:15

## 2023-09-12 RX ADMIN — OXYCODONE HYDROCHLORIDE 5 MILLIGRAM(S): 5 TABLET ORAL at 05:32

## 2023-09-12 RX ADMIN — KETOTIFEN FUMARATE 1 DROP(S): 0.34 SOLUTION OPHTHALMIC at 17:17

## 2023-09-12 RX ADMIN — Medication 3 MILLILITER(S): at 15:55

## 2023-09-12 RX ADMIN — Medication 8 UNIT(S): at 11:18

## 2023-09-12 RX ADMIN — Medication 8 UNIT(S): at 07:49

## 2023-09-12 RX ADMIN — Medication 1 DROP(S): at 05:34

## 2023-09-12 RX ADMIN — NYSTATIN CREAM 1 APPLICATION(S): 100000 CREAM TOPICAL at 05:31

## 2023-09-12 RX ADMIN — Medication 1 APPLICATION(S): at 11:18

## 2023-09-12 RX ADMIN — OXYCODONE HYDROCHLORIDE 5 MILLIGRAM(S): 5 TABLET ORAL at 22:04

## 2023-09-12 RX ADMIN — Medication 3 MILLILITER(S): at 21:00

## 2023-09-12 RX ADMIN — Medication 1 DROP(S): at 17:17

## 2023-09-12 RX ADMIN — Medication 40 MILLIGRAM(S): at 05:32

## 2023-09-12 RX ADMIN — NYSTATIN CREAM 1 APPLICATION(S): 100000 CREAM TOPICAL at 17:16

## 2023-09-12 RX ADMIN — OXYCODONE HYDROCHLORIDE 60 MILLIGRAM(S): 5 TABLET ORAL at 06:17

## 2023-09-12 RX ADMIN — Medication 2000 UNIT(S): at 11:16

## 2023-09-12 RX ADMIN — INSULIN GLARGINE 20 UNIT(S): 100 INJECTION, SOLUTION SUBCUTANEOUS at 22:07

## 2023-09-12 RX ADMIN — OXYCODONE HYDROCHLORIDE 5 MILLIGRAM(S): 5 TABLET ORAL at 22:45

## 2023-09-12 RX ADMIN — Medication 3 MILLILITER(S): at 08:56

## 2023-09-12 RX ADMIN — DULOXETINE HYDROCHLORIDE 30 MILLIGRAM(S): 30 CAPSULE, DELAYED RELEASE ORAL at 11:17

## 2023-09-12 RX ADMIN — KETOTIFEN FUMARATE 1 DROP(S): 0.34 SOLUTION OPHTHALMIC at 05:34

## 2023-09-12 RX ADMIN — Medication 3 MILLILITER(S): at 03:59

## 2023-09-12 RX ADMIN — INSULIN GLARGINE 20 UNIT(S): 100 INJECTION, SOLUTION SUBCUTANEOUS at 07:49

## 2023-09-12 RX ADMIN — Medication 4: at 16:39

## 2023-09-12 RX ADMIN — Medication 8 UNIT(S): at 16:39

## 2023-09-12 RX ADMIN — BUDESONIDE AND FORMOTEROL FUMARATE DIHYDRATE 2 PUFF(S): 160; 4.5 AEROSOL RESPIRATORY (INHALATION) at 21:00

## 2023-09-12 NOTE — PROGRESS NOTE ADULT - ASSESSMENT
67 y/o morbidly obese female with h/o asthma on home o2, HFp EF, spinal stenosis, dm, ckd, , dvt lt. leg in 2004, treated and resolved came in for increasing sob for past 1 week. pt. stated that she has baseline chronic sob but last week was more than her baseline and weakness. As per pt. she gets oob and uses walker. In the ER  first trop 0.09 , d dimer 407, due to pt's weight she exceeds the wt. limit for ct. Cardiology following, continue diuresis, Heparin drip though low suspicion for clot however continues for potential ACS, unable to stress test, unable to perform CTA / VQ scan, LHC / RHC due to weight, Pulm following, severe Pulm HTN, vague h/o Asthma, patient is at baseline oxygenation. Was diuresed.     #Chronic HFpEF   ·  ECHO performed with 55% EF, moderate reduced RV function, severe RVSP elevation   - cardiology signed off  - c/w daily lasix   - euvolemic, no longer acutely exacerbated    #Elevated troponin.   unable to obtain Ischemic workup due to body habitus    #Pulmonary HTN   per ECHO RVSP severely elevated > 70, likely unable to obtain RHC given weight, possibly Type II/III   euvolemic  c/w daily lasix  c/w oxygen long term  symbicort w/ spacer BID    #Obesity hypoventilation syndrome   declining CPAP at this time    #Chronic respiratory failure with hypoxia  Multifactorial- restrictive lung disease 2/2 morbid obesity, HFpEF exacerbation / Severe pulm HTN  Pulm following, short course of nebs, wean to PRN, symbicort with spacer ordered given h/o asthma, with short course of medrol   - patient at baseline oxygen, no longer in acute respiratory failure    #JACQUES on CKD stage 3    - cr 2.03, improving, nearing baseline   avoid nephrotoxic meds, hold lisinopril for now  f/u renal fx  nephro on board    #DM (diabetes mellitus).   uncontrolled  SSI  c/w Lantus BID    #Chronic Edema   Lymphedema noted in B/L LE's, chronic changes of Abdomen with weeping while lifting pannus   Wound care following   Skin maceration in multiple fold areas, Dressing over L. groin wound under pannus     #Wound of foot.   rt. foot wound above 5th toe , recently got skin laceration, will keep on doxycycline  X ray foot shows no signs of foreign body / OM, nonspecific edema of foot   Podiatry consulted, however primarily chronic changes of both feet     #Morbid Obesity   Patient > 580 lbs, greatly impacting potential health care, unable to use CT imaging, NM stress, V/Q scan  Bariatric consult outpatient    #Chronic pain  on oxy ER 60mg Q12  PRN oxy IR 5mg Q8    #Anxiety  likely in the setting of chronic pain and chronic pulmonary disease  c/w xanax 0.25mg Q12  c/w cymbalta 30mg Q24    #Fall at home - Severely deconditioned - PT     DVT prophylaxis: eliquis Q12  Diet: DASH   Dispo: Awaiting Auth for [facility] - Peer-2-peer approved.

## 2023-09-13 ENCOUNTER — TRANSCRIPTION ENCOUNTER (OUTPATIENT)
Age: 68
End: 2023-09-13

## 2023-09-13 VITALS
HEART RATE: 74 BPM | RESPIRATION RATE: 18 BRPM | OXYGEN SATURATION: 93 % | DIASTOLIC BLOOD PRESSURE: 67 MMHG | TEMPERATURE: 98 F | SYSTOLIC BLOOD PRESSURE: 117 MMHG

## 2023-09-13 LAB
ANION GAP SERPL CALC-SCNC: 14 MMOL/L — SIGNIFICANT CHANGE UP (ref 5–17)
BUN SERPL-MCNC: 59 MG/DL — HIGH (ref 8–20)
CALCIUM SERPL-MCNC: 9.4 MG/DL — SIGNIFICANT CHANGE UP (ref 8.4–10.5)
CHLORIDE SERPL-SCNC: 96 MMOL/L — SIGNIFICANT CHANGE UP (ref 96–108)
CO2 SERPL-SCNC: 32 MMOL/L — HIGH (ref 22–29)
CREAT SERPL-MCNC: 1.81 MG/DL — HIGH (ref 0.5–1.3)
EGFR: 30 ML/MIN/1.73M2 — LOW
GLUCOSE BLDC GLUCOMTR-MCNC: 138 MG/DL — HIGH (ref 70–99)
GLUCOSE SERPL-MCNC: 138 MG/DL — HIGH (ref 70–99)
HCT VFR BLD CALC: 38 % — SIGNIFICANT CHANGE UP (ref 34.5–45)
HGB BLD-MCNC: 11.7 G/DL — SIGNIFICANT CHANGE UP (ref 11.5–15.5)
MCHC RBC-ENTMCNC: 28.3 PG — SIGNIFICANT CHANGE UP (ref 27–34)
MCHC RBC-ENTMCNC: 30.8 GM/DL — LOW (ref 32–36)
MCV RBC AUTO: 92 FL — SIGNIFICANT CHANGE UP (ref 80–100)
PLATELET # BLD AUTO: 180 K/UL — SIGNIFICANT CHANGE UP (ref 150–400)
POTASSIUM SERPL-MCNC: 4.2 MMOL/L — SIGNIFICANT CHANGE UP (ref 3.5–5.3)
POTASSIUM SERPL-SCNC: 4.2 MMOL/L — SIGNIFICANT CHANGE UP (ref 3.5–5.3)
RBC # BLD: 4.13 M/UL — SIGNIFICANT CHANGE UP (ref 3.8–5.2)
RBC # FLD: 15.7 % — HIGH (ref 10.3–14.5)
SODIUM SERPL-SCNC: 141 MMOL/L — SIGNIFICANT CHANGE UP (ref 135–145)
WBC # BLD: 8.41 K/UL — SIGNIFICANT CHANGE UP (ref 3.8–10.5)
WBC # FLD AUTO: 8.41 K/UL — SIGNIFICANT CHANGE UP (ref 3.8–10.5)

## 2023-09-13 PROCEDURE — 81001 URINALYSIS AUTO W/SCOPE: CPT

## 2023-09-13 PROCEDURE — 71045 X-RAY EXAM CHEST 1 VIEW: CPT

## 2023-09-13 PROCEDURE — 97110 THERAPEUTIC EXERCISES: CPT

## 2023-09-13 PROCEDURE — 82043 UR ALBUMIN QUANTITATIVE: CPT

## 2023-09-13 PROCEDURE — 94660 CPAP INITIATION&MGMT: CPT

## 2023-09-13 PROCEDURE — 94640 AIRWAY INHALATION TREATMENT: CPT

## 2023-09-13 PROCEDURE — 84295 ASSAY OF SERUM SODIUM: CPT

## 2023-09-13 PROCEDURE — 84484 ASSAY OF TROPONIN QUANT: CPT

## 2023-09-13 PROCEDURE — 73620 X-RAY EXAM OF FOOT: CPT

## 2023-09-13 PROCEDURE — 85379 FIBRIN DEGRADATION QUANT: CPT

## 2023-09-13 PROCEDURE — 36415 COLL VENOUS BLD VENIPUNCTURE: CPT

## 2023-09-13 PROCEDURE — 85018 HEMOGLOBIN: CPT

## 2023-09-13 PROCEDURE — 76775 US EXAM ABDO BACK WALL LIM: CPT

## 2023-09-13 PROCEDURE — 85610 PROTHROMBIN TIME: CPT

## 2023-09-13 PROCEDURE — 96374 THER/PROPH/DIAG INJ IV PUSH: CPT

## 2023-09-13 PROCEDURE — 94760 N-INVAS EAR/PLS OXIMETRY 1: CPT

## 2023-09-13 PROCEDURE — 84156 ASSAY OF PROTEIN URINE: CPT

## 2023-09-13 PROCEDURE — 83605 ASSAY OF LACTIC ACID: CPT

## 2023-09-13 PROCEDURE — 82330 ASSAY OF CALCIUM: CPT

## 2023-09-13 PROCEDURE — 82803 BLOOD GASES ANY COMBINATION: CPT

## 2023-09-13 PROCEDURE — 97530 THERAPEUTIC ACTIVITIES: CPT

## 2023-09-13 PROCEDURE — 87640 STAPH A DNA AMP PROBE: CPT

## 2023-09-13 PROCEDURE — 85025 COMPLETE CBC W/AUTO DIFF WBC: CPT

## 2023-09-13 PROCEDURE — 80053 COMPREHEN METABOLIC PANEL: CPT

## 2023-09-13 PROCEDURE — 83036 HEMOGLOBIN GLYCOSYLATED A1C: CPT

## 2023-09-13 PROCEDURE — 87635 SARS-COV-2 COVID-19 AMP PRB: CPT

## 2023-09-13 PROCEDURE — 0225U NFCT DS DNA&RNA 21 SARSCOV2: CPT

## 2023-09-13 PROCEDURE — C8929: CPT

## 2023-09-13 PROCEDURE — 84132 ASSAY OF SERUM POTASSIUM: CPT

## 2023-09-13 PROCEDURE — 90471 IMMUNIZATION ADMIN: CPT

## 2023-09-13 PROCEDURE — 82962 GLUCOSE BLOOD TEST: CPT

## 2023-09-13 PROCEDURE — 82435 ASSAY OF BLOOD CHLORIDE: CPT

## 2023-09-13 PROCEDURE — 83880 ASSAY OF NATRIURETIC PEPTIDE: CPT

## 2023-09-13 PROCEDURE — 82947 ASSAY GLUCOSE BLOOD QUANT: CPT

## 2023-09-13 PROCEDURE — 99285 EMERGENCY DEPT VISIT HI MDM: CPT | Mod: 25

## 2023-09-13 PROCEDURE — 80048 BASIC METABOLIC PNL TOTAL CA: CPT

## 2023-09-13 PROCEDURE — 87641 MR-STAPH DNA AMP PROBE: CPT

## 2023-09-13 PROCEDURE — 99239 HOSP IP/OBS DSCHRG MGMT >30: CPT

## 2023-09-13 PROCEDURE — 82570 ASSAY OF URINE CREATININE: CPT

## 2023-09-13 PROCEDURE — 85730 THROMBOPLASTIN TIME PARTIAL: CPT

## 2023-09-13 PROCEDURE — 85014 HEMATOCRIT: CPT

## 2023-09-13 PROCEDURE — 85027 COMPLETE CBC AUTOMATED: CPT

## 2023-09-13 PROCEDURE — 93970 EXTREMITY STUDY: CPT

## 2023-09-13 PROCEDURE — 96375 TX/PRO/DX INJ NEW DRUG ADDON: CPT

## 2023-09-13 PROCEDURE — 90715 TDAP VACCINE 7 YRS/> IM: CPT

## 2023-09-13 RX ORDER — ERGOCALCIFEROL 1.25 MG/1
1 CAPSULE ORAL
Qty: 0 | Refills: 0 | DISCHARGE

## 2023-09-13 RX ORDER — CHOLECALCIFEROL (VITAMIN D3) 125 MCG
2000 CAPSULE ORAL
Qty: 0 | Refills: 0 | DISCHARGE
Start: 2023-09-13

## 2023-09-13 RX ORDER — APIXABAN 2.5 MG/1
1 TABLET, FILM COATED ORAL
Qty: 0 | Refills: 0 | DISCHARGE
Start: 2023-09-13

## 2023-09-13 RX ORDER — CADEXOMER IODINE 0.9 %
1 PADS, MEDICATED (EA) TOPICAL
Qty: 0 | Refills: 0 | DISCHARGE
Start: 2023-09-13

## 2023-09-13 RX ORDER — DULOXETINE HYDROCHLORIDE 30 MG/1
1 CAPSULE, DELAYED RELEASE ORAL
Qty: 0 | Refills: 0 | DISCHARGE
Start: 2023-09-13

## 2023-09-13 RX ADMIN — OXYCODONE HYDROCHLORIDE 60 MILLIGRAM(S): 5 TABLET ORAL at 04:50

## 2023-09-13 RX ADMIN — OXYCODONE HYDROCHLORIDE 5 MILLIGRAM(S): 5 TABLET ORAL at 11:54

## 2023-09-13 RX ADMIN — KETOTIFEN FUMARATE 1 DROP(S): 0.34 SOLUTION OPHTHALMIC at 04:48

## 2023-09-13 RX ADMIN — POLYETHYLENE GLYCOL 3350 17 GRAM(S): 17 POWDER, FOR SOLUTION ORAL at 10:55

## 2023-09-13 RX ADMIN — Medication 40 MILLIGRAM(S): at 04:49

## 2023-09-13 RX ADMIN — Medication 3 MILLILITER(S): at 09:40

## 2023-09-13 RX ADMIN — NYSTATIN CREAM 1 APPLICATION(S): 100000 CREAM TOPICAL at 04:50

## 2023-09-13 RX ADMIN — LORATADINE 10 MILLIGRAM(S): 10 TABLET ORAL at 10:56

## 2023-09-13 RX ADMIN — Medication 8 UNIT(S): at 07:49

## 2023-09-13 RX ADMIN — INSULIN GLARGINE 20 UNIT(S): 100 INJECTION, SOLUTION SUBCUTANEOUS at 07:49

## 2023-09-13 RX ADMIN — Medication 1 DROP(S): at 04:48

## 2023-09-13 RX ADMIN — DULOXETINE HYDROCHLORIDE 30 MILLIGRAM(S): 30 CAPSULE, DELAYED RELEASE ORAL at 10:56

## 2023-09-13 RX ADMIN — CHLORHEXIDINE GLUCONATE 1 APPLICATION(S): 213 SOLUTION TOPICAL at 04:50

## 2023-09-13 RX ADMIN — OXYCODONE HYDROCHLORIDE 60 MILLIGRAM(S): 5 TABLET ORAL at 05:36

## 2023-09-13 RX ADMIN — Medication 2000 UNIT(S): at 10:55

## 2023-09-13 RX ADMIN — OXYCODONE HYDROCHLORIDE 5 MILLIGRAM(S): 5 TABLET ORAL at 10:54

## 2023-09-13 RX ADMIN — Medication 1 APPLICATION(S): at 10:58

## 2023-09-13 RX ADMIN — BUDESONIDE AND FORMOTEROL FUMARATE DIHYDRATE 2 PUFF(S): 160; 4.5 AEROSOL RESPIRATORY (INHALATION) at 09:40

## 2023-09-13 RX ADMIN — APIXABAN 5 MILLIGRAM(S): 2.5 TABLET, FILM COATED ORAL at 04:49

## 2023-09-13 RX ADMIN — Medication 81 MILLIGRAM(S): at 10:55

## 2023-09-13 NOTE — PROGRESS NOTE ADULT - PROVIDER SPECIALTY LIST ADULT
Hospitalist
Pulmonology
Hospitalist
Nephrology
Podiatry
Cardiology
Hospitalist
Hospitalist
Cardiology
Pain Medicine
Cardiology
Cardiology
Pulmonology
Pulmonology
Cardiology

## 2023-09-13 NOTE — DISCHARGE NOTE NURSING/CASE MANAGEMENT/SOCIAL WORK - NSDCPEFALRISK_GEN_ALL_CORE
For information on Fall & Injury Prevention, visit: [email or web address] OR  [email or web address] OR  https://www.cdc.gov/steadi/patient.html

## 2023-09-13 NOTE — DISCHARGE NOTE NURSING/CASE MANAGEMENT/SOCIAL WORK - NSDCVIVACCINE_GEN_ALL_CORE_FT
COVID-19 vaccine, vector-nr, rS-Ad26, PF, 0.5 mL (Omar); 16-Mar-2021 13:03; Kimberly Maya (RN); [facility]; [id number] (Exp. Date: 26-May-2021); IntraMuscular; Deltoid Left.; 0.5 milliLiter(s);   COVID-19, mRNA, LNP-S, PF, 100 mcg/ 0.5 mL dose (Moderna); 28-Jan-2022 10:09; Senia Juan (RN); [employer] [id number] (Exp. Date: 14-Apr-2022); IntraMuscular; Deltoid Left.; 0.25 milliLiter(s);   influenza, injectable, quadrivalent, preservative free; 09-Nov-2018 13:40; Mane Camejo (RN); [employer]; [id number] (Exp. Date: 16-Jun-2019); IntraMuscular; Deltoid Left.; 0.5 milliLiter(s); VIS (VIS Published: 07-Aug-2015, VIS Presented: 09-Nov-2018);   influenza, injectable, quadrivalent, preservative free; 15-Nov-2020 12:04; Ramiro Alexander (RN); [employer]; [id number] (Exp. Date: 30-Jun-2021); IntraMuscular; Deltoid Left.; 0.5 milliLiter(s); VIS (VIS Published: 15-Aug-2019, VIS Presented: 15-Nov-2020);   influenza, high-dose, quadrivalent; 28-Jan-2022 11:42; Senia Juan (RN); [employer]; [id number] (Exp. Date: 30-Jun-2022); IntraMuscular; Deltoid Right.; 0.7 milliLiter(s); VIS (VIS Published: 06-Aug-2021, VIS Presented: 28-Jan-2022);   Tdap; 24-Jan-2022 08:46; Lisa Yanes (RN); [employer]; [id number] (Exp. Date: 09-Sep-2023); IntraMuscular; Deltoid Left.; 0.5 milliLiter(s); VIS (VIS Published: 09-May-2013, VIS Presented: 24-Jan-2022);   Tdap; 30-Aug-2023 17:21; Edwige Reyes (RN); [employer]; [id number] (Exp. Date: 03-Oct-2025); IntraMuscular; Deltoid Left.; 0.5 milliLiter(s); VIS (VIS Published: 09-May-2013, VIS Presented: 30-Aug-2023);

## 2023-09-13 NOTE — DISCHARGE NOTE NURSING/CASE MANAGEMENT/SOCIAL WORK - PATIENT PORTAL LINK FT
You can access the FollowMyHealth Patient Portal offered by [facility] by registering at the following website: [email or web address]. By joining [employer]’s FollowMyHealth portal, you will also be able to view your health information using other applications (apps) compatible with our system.

## 2023-09-13 NOTE — PROGRESS NOTE ADULT - REASON FOR ADMISSION
dyspnea, elevated troponin

## 2023-09-13 NOTE — PROGRESS NOTE ADULT - SUBJECTIVE AND OBJECTIVE BOX
Hospitalist Progress Note    Chief Complaint:  Dyspnea, Elevated, troponin    SUBJECTIVE / OVERNIGHT EVENTS:  No events overnight, patient seen at bedside, in NAD, No major complaints at this time. Patient denies chest pain, abd pain, N/V, fever, chills, dysuria or any other complaints. All remainder ROS negative.       Vital Signs Last 24 Hrs  T(C): 36.3 (10 Sep 2023 10:53), Max: 36.6 (09 Sep 2023 21:56)  T(F): 97.4 (10 Sep 2023 10:53), Max: 97.9 (10 Sep 2023 04:52)  HR: 71 (10 Sep 2023 10:53) (66 - 75)  BP: 121/63 (10 Sep 2023 10:53) (105/61 - 124/67)  BP(mean): --  RR: 18 (10 Sep 2023 10:53) (18 - 18)  SpO2: 93% (10 Sep 2023 10:53) (91% - 95%)    Parameters below as of 10 Sep 2023 10:53  Patient On (Oxygen Delivery Method): nasal cannula        General: Morbidly obese female in bed not in distress  eyes : PERRL. intact EOM.   HENT: AT, NC. no throat erythema or exudate.  Chest: Symmetric chest rise, difficult to properly auscultate breath sounds due to body habitus   Heart: S1,S2. RRR. no heart murmur. + edema of lower ext. b/L.   Ext: bilateral non-pitting stasis edema, stasis dermatitis  vascular : distal pulses palpable  Neuro: AAO x3. no focal weakness. no speech disorder, cns intact    LABS:                                                               12.1   11.32 )-----------( 173      ( 09 Sep 2023 06:50 )             40.3     09-09    138  |  92<L>  |  69.9<H>  ----------------------------<  194<H>  4.6   |  33.0<H>  |  1.92<H>    Ca    10.0      09 Sep 2023 06:50          MEDICATIONS  (STANDING):  albuterol/ipratropium for Nebulization 3 milliLiter(s) Nebulizer every 6 hours  apixaban 5 milliGRAM(s) Oral every 12 hours  artificial  tears Solution 1 Drop(s) Both EYES two times a day  aspirin enteric coated 81 milliGRAM(s) Oral daily  atorvastatin 40 milliGRAM(s) Oral at bedtime  budesonide 160 MICROgram(s)/formoterol 4.5 MICROgram(s) Inhaler 2 Puff(s) Inhalation two times a day  cadexomer iodine 0.9% Gel 1 Application(s) Topical daily  chlorhexidine 2% Cloths 1 Application(s) Topical <User Schedule>  cholecalciferol 2000 Unit(s) Oral daily  dextrose 5%. 1000 milliLiter(s) (50 mL/Hr) IV Continuous <Continuous>  dextrose 5%. 1000 milliLiter(s) (100 mL/Hr) IV Continuous <Continuous>  dextrose 50% Injectable 25 Gram(s) IV Push once  dextrose 50% Injectable 12.5 Gram(s) IV Push once  dextrose 50% Injectable 25 Gram(s) IV Push once  DULoxetine 30 milliGRAM(s) Oral daily  furosemide    Tablet 40 milliGRAM(s) Oral daily  glucagon  Injectable 1 milliGRAM(s) IntraMuscular once  insulin glargine Injectable (LANTUS) 20 Unit(s) SubCutaneous every morning  insulin glargine Injectable (LANTUS) 20 Unit(s) SubCutaneous at bedtime  insulin lispro (ADMELOG) corrective regimen sliding scale   SubCutaneous at bedtime  insulin lispro (ADMELOG) corrective regimen sliding scale   SubCutaneous three times a day before meals  ketotifen 0.025% Ophthalmic Solution 1 Drop(s) Both EYES two times a day  loratadine 10 milliGRAM(s) Oral daily  nystatin Powder 1 Application(s) Topical every 12 hours  oxyCODONE  ER Tablet 60 milliGRAM(s) Oral every 12 hours  polyethylene glycol 3350 17 Gram(s) Oral daily    MEDICATIONS  (PRN):  albuterol    0.083% 2.5 milliGRAM(s) Nebulizer every 4 hours PRN Shortness of Breath and/or Wheezing  ALPRAZolam 0.25 milliGRAM(s) Oral every 12 hours PRN Anxiety  cyclobenzaprine 5 milliGRAM(s) Oral three times a day PRN Muscle Spasm  dextrose Oral Gel 15 Gram(s) Oral once PRN Blood Glucose LESS THAN 70 milliGRAM(s)/deciliter  lactulose Syrup 20 Gram(s) Oral every 8 hours PRN Constipation  oxyCODONE    IR 5 milliGRAM(s) Oral every 8 hours PRN Severe Pain (7 - 10)    RADIOLOGY & ADDITIONAL TESTS:  Results Reviewed: Y  Imaging Personally Reviewed: N  Electrocardiogram Personally Reviewed: RAYMNUDO                                          
Hospitalist Progress Note    Chief Complaint:  Dyspnea, Elevated, troponin    SUBJECTIVE / OVERNIGHT EVENTS:  No events overnight, patient seen at bedside, in NAD, complaining of chronic pain and SOB. Patient denies chest pain, abd pain, N/V, fever, chills, dysuria or any other complaints. All remainder ROS negative.     MEDICATIONS  (STANDING):  albuterol/ipratropium for Nebulization 3 milliLiter(s) Nebulizer every 6 hours  artificial  tears Solution 1 Drop(s) Both EYES two times a day  aspirin enteric coated 81 milliGRAM(s) Oral daily  atorvastatin 40 milliGRAM(s) Oral at bedtime  budesonide 160 MICROgram(s)/formoterol 4.5 MICROgram(s) Inhaler 2 Puff(s) Inhalation two times a day  buMETAnide Injectable 2 milliGRAM(s) IV Push every 12 hours  cadexomer iodine 0.9% Gel 1 Application(s) Topical daily  chlorhexidine 2% Cloths 1 Application(s) Topical <User Schedule>  cholecalciferol 2000 Unit(s) Oral daily  dextrose 5%. 1000 milliLiter(s) (50 mL/Hr) IV Continuous <Continuous>  dextrose 5%. 1000 milliLiter(s) (100 mL/Hr) IV Continuous <Continuous>  dextrose 50% Injectable 25 Gram(s) IV Push once  dextrose 50% Injectable 25 Gram(s) IV Push once  dextrose 50% Injectable 12.5 Gram(s) IV Push once  doxycycline monohydrate Capsule 100 milliGRAM(s) Oral every 12 hours  glucagon  Injectable 1 milliGRAM(s) IntraMuscular once  heparin  Infusion.  Unit(s)/Hr (24 mL/Hr) IV Continuous <Continuous>  insulin glargine Injectable (LANTUS) 15 Unit(s) SubCutaneous at bedtime  insulin glargine Injectable (LANTUS) 15 Unit(s) SubCutaneous every morning  insulin lispro (ADMELOG) corrective regimen sliding scale   SubCutaneous at bedtime  insulin lispro (ADMELOG) corrective regimen sliding scale   SubCutaneous three times a day before meals  ketotifen 0.025% Ophthalmic Solution 1 Drop(s) Both EYES two times a day  loratadine 10 milliGRAM(s) Oral daily  nystatin Powder 1 Application(s) Topical every 12 hours  oxyCODONE  ER Tablet 60 milliGRAM(s) Oral every 12 hours  polyethylene glycol 3350 17 Gram(s) Oral daily  predniSONE   Tablet   Oral   predniSONE   Tablet 20 milliGRAM(s) Oral daily    MEDICATIONS  (PRN):  albuterol    0.083% 2.5 milliGRAM(s) Nebulizer every 4 hours PRN Shortness of Breath and/or Wheezing  ALPRAZolam 0.25 milliGRAM(s) Oral every 12 hours PRN Anxiety  dextrose Oral Gel 15 Gram(s) Oral once PRN Blood Glucose LESS THAN 70 milliGRAM(s)/deciliter  heparin   Injectable 62979 Unit(s) IV Push every 6 hours PRN For aPTT less than 40  heparin   Injectable 5000 Unit(s) IV Push every 6 hours PRN For aPTT between 40 - 57  HYDROmorphone  Injectable 1 milliGRAM(s) IV Push every 3 hours PRN Severe Pain (7 - 10)  lactulose Syrup 20 Gram(s) Oral every 8 hours PRN Constipation        I&O's Summary    02 Sep 2023 07:01  -  03 Sep 2023 07:00  --------------------------------------------------------  IN: 1412 mL / OUT: 5900 mL / NET: -4488 mL    03 Sep 2023 07:01  -  03 Sep 2023 12:20  --------------------------------------------------------  IN: 0 mL / OUT: 1000 mL / NET: -1000 mL        PHYSICAL EXAM:  Vital Signs Last 24 Hrs  T(C): 37.3 (03 Sep 2023 10:44), Max: 37.3 (03 Sep 2023 10:44)  T(F): 99.1 (03 Sep 2023 10:44), Max: 99.1 (03 Sep 2023 10:44)  HR: 78 (03 Sep 2023 10:44) (73 - 86)  BP: 126/53 (03 Sep 2023 10:44) (123/55 - 136/65)  BP(mean): --  RR: 19 (03 Sep 2023 10:44) (18 - 20)  SpO2: 94% (03 Sep 2023 10:44) (92% - 95%)    Parameters below as of 03 Sep 2023 10:44  Patient On (Oxygen Delivery Method): nasal cannula        General: Morbidly obese female in bed not in distress  eyes : PERRL. intact EOM.   HENT: AT, NC. no throat erythema or exudate.  Chest: Symmetric chest rise, difficult to properly auscultate breath sounds due to body habitus   Heart: S1,S2. RRR. no heart murmur. + edema of lower ext. b/L.   Abdomen: soft. non-tender. morbidly obese, chronic skin changes around entire pannus, + BS.   Ext: no calf tenderness. moves all ext. independently  vascular : distal pulses palpable  Neuro: AAO x3. no focal weakness. no speech disorder, cns intact  Skin: rt. abdominal fold area and under rt. breast area with skin maceration, L. abdominal fold / groin area with small open wound dressing in place, stage 1-2 sacral decubitus,  rt. foot above the 5th toe a small laceration noted, mild surrounding erythema, no discharge.  psych : mood ok, no si/hi    LABS:                        10.8   13.35 )-----------( 205      ( 03 Sep 2023 05:32 )             34.7     09-03    139  |  94<L>  |  56.3<H>  ----------------------------<  300<H>  5.1   |  32.0<H>  |  2.36<H>    Ca    9.6      03 Sep 2023 05:32      PTT - ( 03 Sep 2023 05:32 )  PTT:66.7 sec      Urinalysis Basic - ( 03 Sep 2023 05:32 )    Color: x / Appearance: x / SG: x / pH: x  Gluc: 300 mg/dL / Ketone: x  / Bili: x / Urobili: x   Blood: x / Protein: x / Nitrite: x   Leuk Esterase: x / RBC: x / WBC x   Sq Epi: x / Non Sq Epi: x / Bacteria: x        CAPILLARY BLOOD GLUCOSE      POCT Blood Glucose.: 352 mg/dL (03 Sep 2023 11:38)  POCT Blood Glucose.: 301 mg/dL (03 Sep 2023 08:04)  POCT Blood Glucose.: 372 mg/dL (02 Sep 2023 21:50)  POCT Blood Glucose.: 344 mg/dL (02 Sep 2023 17:04)        RADIOLOGY & ADDITIONAL TESTS:  Results Reviewed: Y  Imaging Personally Reviewed: N  Electrocardiogram Personally Reviewed: N                                          
[facility] DIVISION OF KIDNEY DISEASES AND HYPERTENSION -- FOLLOW UP NOTE  --------------------------------------------------------------------------------  Chief Complaint: Ned on  CKD    24 hour events/subjective:    no acute event noted  pt seen and examined; feels well    PAST HISTORY  --------------------------------------------------------------------------------  No significant changes to PMH, PSH, FHx, SHx, unless otherwise noted    ALLERGIES & MEDICATIONS  --------------------------------------------------------------------------------  Allergies    adhesives (Rash)  latex (Rash)  wool- rash, itch (Other)  Bactrim (Flushing)    Intolerances      Standing Inpatient Medications  albuterol/ipratropium for Nebulization 3 milliLiter(s) Nebulizer every 6 hours  apixaban 5 milliGRAM(s) Oral every 12 hours  artificial  tears Solution 1 Drop(s) Both EYES two times a day  aspirin enteric coated 81 milliGRAM(s) Oral daily  atorvastatin 40 milliGRAM(s) Oral at bedtime  budesonide 160 MICROgram(s)/formoterol 4.5 MICROgram(s) Inhaler 2 Puff(s) Inhalation two times a day  cadexomer iodine 0.9% Gel 1 Application(s) Topical daily  chlorhexidine 2% Cloths 1 Application(s) Topical <User Schedule>  cholecalciferol 2000 Unit(s) Oral daily  dextrose 5%. 1000 milliLiter(s) IV Continuous <Continuous>  dextrose 5%. 1000 milliLiter(s) IV Continuous <Continuous>  dextrose 50% Injectable 25 Gram(s) IV Push once  dextrose 50% Injectable 12.5 Gram(s) IV Push once  dextrose 50% Injectable 25 Gram(s) IV Push once  DULoxetine 30 milliGRAM(s) Oral daily  furosemide    Tablet 40 milliGRAM(s) Oral daily  glucagon  Injectable 1 milliGRAM(s) IntraMuscular once  insulin glargine Injectable (LANTUS) 20 Unit(s) SubCutaneous every morning  insulin glargine Injectable (LANTUS) 20 Unit(s) SubCutaneous at bedtime  insulin lispro (ADMELOG) corrective regimen sliding scale   SubCutaneous at bedtime  insulin lispro (ADMELOG) corrective regimen sliding scale   SubCutaneous three times a day before meals  ketotifen 0.025% Ophthalmic Solution 1 Drop(s) Both EYES two times a day  loratadine 10 milliGRAM(s) Oral daily  nystatin Powder 1 Application(s) Topical every 12 hours  oxyCODONE  ER Tablet 60 milliGRAM(s) Oral every 12 hours  polyethylene glycol 3350 17 Gram(s) Oral daily    PRN Inpatient Medications  albuterol    0.083% 2.5 milliGRAM(s) Nebulizer every 4 hours PRN  ALPRAZolam 0.25 milliGRAM(s) Oral every 12 hours PRN  cyclobenzaprine 5 milliGRAM(s) Oral three times a day PRN  dextrose Oral Gel 15 Gram(s) Oral once PRN  lactulose Syrup 20 Gram(s) Oral every 8 hours PRN  oxyCODONE    IR 5 milliGRAM(s) Oral every 8 hours PRN      REVIEW OF SYSTEMS  --------------------------------------------------------------------------------  Gen: No weight changes, fatigue, fevers/chills, weakness  Skin: No rashes  Head/Eyes/Ears/Mouth: No headache; Normal hearing; Normal vision w/o blurriness; No sinus pain/discomfort, sore throat  Respiratory: No dyspnea, cough, wheezing, hemoptysis  CV: No chest pain, PND, orthopnea  GI: No abdominal pain, diarrhea, constipation, nausea, vomiting, melena, hematochezia  : No increased frequency, dysuria, hematuria, nocturia  MSK: No joint pain/swelling; no back pain; no edema  Neuro: No dizziness/lightheadedness, weakness, seizures, numbness, tingling  Heme: No easy bruising or bleeding  Endo: No heat/cold intolerance  Psych: No significant nervousness, anxiety, stress, depression    All other systems were reviewed and are negative, except as noted.    VITALS/PHYSICAL EXAM  --------------------------------------------------------------------------------  T(C): 36.7 (09-06-23 @ 21:40), Max: 36.8 (09-06-23 @ 14:30)  HR: 74 (09-07-23 @ 08:50) (72 - 81)  BP: 135/66 (09-06-23 @ 21:40) (119/65 - 135/66)  RR: 18 (09-06-23 @ 21:40) (18 - 18)  SpO2: 95% (09-07-23 @ 10:32) (91% - 95%)  Wt(kg): --        09-06-23 @ 07:01  -  09-07-23 @ 07:00  --------------------------------------------------------  IN: 0 mL / OUT: 3600 mL / NET: -3600 mL      Physical Exam:  	Gen: NAD, obese  	HEENT: on supplemental O2 via NC  	Pulm: CTA B/L  	CV: RRR, S1S2; no rub  	Back: no sacral edema  	Abd: +BS, soft, nontender/nondistended  	: No suprapubic tenderness  	UE: Warm,  no edema  	LE: Warm,  no edema  	Neuro: No focal deficit  	Psych: Normal affect and mood  	Skin: Warm  LABS/STUDIES  --------------------------------------------------------------------------------              12.2   13.11 >-----------<  182      [09-07-23 @ 06:35]              38.8     142  |  95  |  71.2  ----------------------------<  252      [09-07-23 @ 06:35]  4.8   |  33.0  |  2.04        Ca     10.3     [09-07-23 @ 06:35]        PTT: 26.7       [09-07-23 @ 06:35]      Creatinine Trend:  SCr 2.04 [09-07 @ 06:35]  SCr 2.18 [09-06 @ 04:25]  SCr 2.32 [09-05 @ 05:33]  SCr 2.32 [09-04 @ 05:30]  SCr 2.36 [09-03 @ 05:32]    Urinalysis - [09-07-23 @ 06:35]      Color  / Appearance  / SG  / pH       Gluc 252 / Ketone   / Bili  / Urobili        Blood  / Protein  / Leuk Est  / Nitrite       RBC  / WBC  / Hyaline  / Gran  / Sq Epi  / Non Sq Epi  / Bacteria     Urine Creatinine 19      [09-03-23 @ 18:30]  Urine Protein 5.0      [09-03-23 @ 18:30]    Ferritin 339      [10-20-22 @ 12:16]  HbA1c 7.2      [11-07-18 @ 16:42]      
PULMONARY PROGRESS NOTE      [id number]    Patient is a 68y old  Female who presents with a chief complaint of dyspnea, elevated troponin (01 Sep 2023 08:38)  68y old  Female who presents with a chief complaint of dyspnea, elevated troponin (30 Aug 2023 21:35)  This is 67 y/o female with hx of HFpEF, on home O2 4L, pulmonary HTN, morbid obesity, asthma, CKD3, IDDM2, uterine cancer s/p MEGAN, DVT LLE (2004), chronic leg edema 2/2 venous stasis, spinal stenosis with chronic back pain who presents with SOB. Pt states that she has chronic difficulty breathing but it got even worse than normal over the past week. Pt ambulates with a walker. She fell and sustained right foot laceration a few days ago. Today she wasn't able to get out of bed due to dyspnea and pain and family called EMS. In the ED pt found with elevated D-dimer (407) and troponin  (0.09) and was started on Heparin drip. Cr 1.89, proBNP 1623. Pt follows with a cardiologist from [place]. Pt states that she's been having daily, intermittent chest pain for years without any recent change. She had an abnormal stress test in the past but no cardiac catheterization was performed. Echocardiogram in 10/2022 showed EF 50-55% and no significant valvular abnormalities.  never smoker  intolerant of cpap in past  last PFts 2019- mod restriction, mod impaired DLCO  Treated as "asthma"  currently no cp or sputum  fell at home, unstable transfers  lives in room most of time, has toilet in room  02 4 L continuously      INTERVAL HPI/OVERNIGHT EVENTS:  Comfortable   No complaints     MEDICATIONS  (STANDING):  albuterol/ipratropium for Nebulization 3 milliLiter(s) Nebulizer every 6 hours  aspirin enteric coated 81 milliGRAM(s) Oral daily  atorvastatin 40 milliGRAM(s) Oral at bedtime  budesonide 160 MICROgram(s)/formoterol 4.5 MICROgram(s) Inhaler 2 Puff(s) Inhalation two times a day  chlorhexidine 2% Cloths 1 Application(s) Topical <User Schedule>  cholecalciferol 2000 Unit(s) Oral daily  dextrose 5%. 1000 milliLiter(s) (50 mL/Hr) IV Continuous <Continuous>  dextrose 5%. 1000 milliLiter(s) (100 mL/Hr) IV Continuous <Continuous>  dextrose 50% Injectable 12.5 Gram(s) IV Push once  dextrose 50% Injectable 25 Gram(s) IV Push once  dextrose 50% Injectable 25 Gram(s) IV Push once  doxycycline monohydrate Capsule 100 milliGRAM(s) Oral every 12 hours  furosemide   Injectable 40 milliGRAM(s) IV Push every 12 hours  glucagon  Injectable 1 milliGRAM(s) IntraMuscular once  heparin  Infusion.  Unit(s)/Hr (24 mL/Hr) IV Continuous <Continuous>  insulin glargine Injectable (LANTUS) 15 Unit(s) SubCutaneous every morning  insulin glargine Injectable (LANTUS) 15 Unit(s) SubCutaneous at bedtime  insulin lispro (ADMELOG) corrective regimen sliding scale   SubCutaneous at bedtime  insulin lispro (ADMELOG) corrective regimen sliding scale   SubCutaneous three times a day before meals  loratadine 10 milliGRAM(s) Oral daily  nystatin Powder 1 Application(s) Topical every 12 hours  oxyCODONE  ER Tablet 60 milliGRAM(s) Oral every 12 hours  polyethylene glycol 3350 17 Gram(s) Oral daily  predniSONE   Tablet   Oral   predniSONE   Tablet 30 milliGRAM(s) Oral daily      MEDICATIONS  (PRN):  albuterol    0.083% 2.5 milliGRAM(s) Nebulizer every 4 hours PRN Shortness of Breath and/or Wheezing  dextrose Oral Gel 15 Gram(s) Oral once PRN Blood Glucose LESS THAN 70 milliGRAM(s)/deciliter  heparin   Injectable 42570 Unit(s) IV Push every 6 hours PRN For aPTT less than 40  heparin   Injectable 5000 Unit(s) IV Push every 6 hours PRN For aPTT between 40 - 57      Allergies    adhesives (Rash)  latex (Rash)  wool- rash, itch (Other)  Bactrim (Flushing)    Intolerances        PAST MEDICAL & SURGICAL HISTORY:  Diabetes Mellitus Type II      HTN (Hypertension)      Endometrial Hyperplasia      Cervical Stenosis of Spine      Spinal Stenosis, Lumbar      Deep Vein Thrombosis (DVT)  Left leg, 2004, treated and resolved      Dyslipidemia      Cataract      Morbid Obesity      Vitamin D deficiency      Insomnia      CKD (chronic kidney disease)  ~ III      Congestive heart failure  ~ HFpEF      Uterine cancer      Asthma      On home oxygen therapy      Gait difficulty  ~ u ses walker      Cataract extracted with lens implant 1998  Right      C Section 1994      Cholecystectomy/appendectomy @ age 25      D&C x2 1980's      D&C 2008  hysteroscopy, endometrial hyperplasia, 2009      Cervical Spinal Stenosis surgery x2 (01/2002, 7/2002)      Tonsillectomy as a child      Endometrial biopsy 12/02/09      H/O laser iridotomy  left eye, 2016      H/O colonoscopy  1998      S/P total abdominal hysterectomy and bilateral salpingo-oophorectomy      S/P appendectomy      S/P cholecystectomy          SOCIAL HISTORY  Smoking History:       REVIEW OF SYSTEMS:    CONSTITUTIONAL:  No distress    CARDIOVASCULAR:  No pressure, squeezing, tightness, heaviness or aching about the chest; no palpitations.    RESPIRATORY:  No cough, shortness of breath, PND or orthopnea. Mod SOBOE      Vital Signs Last 24 Hrs  T(C): 36.6 (01 Sep 2023 04:18), Max: 36.6 (31 Aug 2023 21:47)  T(F): 97.9 (01 Sep 2023 04:18), Max: 97.9 (31 Aug 2023 21:47)  HR: 75 (01 Sep 2023 04:18) (71 - 91)  BP: 129/65 (01 Sep 2023 04:18) (118/65 - 132/69)  BP(mean): 86 (01 Sep 2023 04:18) (78 - 86)  RR: 18 (01 Sep 2023 04:18) (16 - 19)  SpO2: 94% (01 Sep 2023 04:18) (94% - 97%)    Parameters below as of 01 Sep 2023 04:18  Patient On (Oxygen Delivery Method): nasal cannula  O2 Flow (L/min): 5      PHYSICAL EXAMINATION:    GENERAL: The patient is awake and alert in no apparent distress.     HEENT: Head is normocephalic and atraumatic.     NECK: Supple.    LUNGS: Clear to auscultation without wheezing, rales or rhonchi; respirations unlabored    HEART: Regular rate and rhythm without murmur.      LABS:                        10.5   8.76  )-----------( 210      ( 01 Sep 2023 02:28 )             35.9     09-01    140  |  98  |  39.1<H>  ----------------------------<  134<H>  5.2   |  32.0<H>  |  2.12<H>    Ca    9.0      01 Sep 2023 02:28    TPro  7.0  /  Alb  3.7  /  TBili  0.6  /  DBili  x   /  AST  26  /  ALT  18  /  AlkPhos  222<H>  08-30    PT/INR - ( 30 Aug 2023 17:13 )   PT: 11.8 sec;   INR: 1.07 ratio         PTT - ( 01 Sep 2023 02:28 )  PTT:64.5 sec  Urinalysis Basic - ( 01 Sep 2023 02:28 )    Color: x / Appearance: x / SG: x / pH: x  Gluc: 134 mg/dL / Ketone: x  / Bili: x / Urobili: x   Blood: x / Protein: x / Nitrite: x   Leuk Esterase: x / RBC: x / WBC x   Sq Epi: x / Non Sq Epi: x / Bacteria: x        CARDIAC MARKERS ( 31 Aug 2023 10:25 )  x     / 0.06 ng/mL / x     / x     / x      CARDIAC MARKERS ( 31 Aug 2023 00:43 )  x     / 0.08 ng/mL / x     / x     / x      CARDIAC MARKERS ( 30 Aug 2023 17:13 )  x     / 0.09 ng/mL / x     / x     / x            
Hospitalist Progress Note    Chief Complaint:  Dyspnea, Elevated, troponin    SUBJECTIVE / OVERNIGHT EVENTS:  No events overnight, patient seen at bedside, in NAD, No major complaints at this time. Patient denies chest pain, abd pain, N/V, fever, chills, dysuria or any other complaints. All remainder ROS negative.       Vital Signs Last 24 Hrs  T(C): 36.8 (12 Sep 2023 11:06), Max: 36.8 (12 Sep 2023 05:04)  T(F): 98.2 (12 Sep 2023 11:06), Max: 98.3 (12 Sep 2023 05:04)  HR: 68 (12 Sep 2023 11:06) (68 - 75)  BP: 122/64 (12 Sep 2023 11:06) (109/62 - 122/64)  BP(mean): --  RR: 18 (12 Sep 2023 11:06) (18 - 19)  SpO2: 95% (12 Sep 2023 11:06) (93% - 96%)    Parameters below as of 12 Sep 2023 11:06  Patient On (Oxygen Delivery Method): nasal cannula, 4L          General: Morbidly obese female in bed not in distress  eyes : PERRL. intact EOM.   HENT: AT, NC. no throat erythema or exudate.  Chest: Symmetric chest rise, difficult to properly auscultate breath sounds due to body habitus   Heart: S1,S2. RRR. no heart murmur. + edema of lower ext. b/L.   Ext: bilateral non-pitting stasis edema, stasis dermatitis  vascular : distal pulses palpable  Neuro: AAO x3. no focal weakness    LABS:                                             11.9   9.04  )-----------( 166      ( 12 Sep 2023 05:52 )             38.0     MEDICATIONS  (STANDING):  albuterol/ipratropium for Nebulization 3 milliLiter(s) Nebulizer every 6 hours  apixaban 5 milliGRAM(s) Oral every 12 hours  artificial  tears Solution 1 Drop(s) Both EYES two times a day  aspirin enteric coated 81 milliGRAM(s) Oral daily  atorvastatin 40 milliGRAM(s) Oral at bedtime  budesonide 160 MICROgram(s)/formoterol 4.5 MICROgram(s) Inhaler 2 Puff(s) Inhalation two times a day  cadexomer iodine 0.9% Gel 1 Application(s) Topical daily  chlorhexidine 2% Cloths 1 Application(s) Topical <User Schedule>  cholecalciferol 2000 Unit(s) Oral daily  dextrose 5%. 1000 milliLiter(s) (100 mL/Hr) IV Continuous <Continuous>  dextrose 5%. 1000 milliLiter(s) (50 mL/Hr) IV Continuous <Continuous>  dextrose 50% Injectable 12.5 Gram(s) IV Push once  dextrose 50% Injectable 25 Gram(s) IV Push once  dextrose 50% Injectable 25 Gram(s) IV Push once  DULoxetine 30 milliGRAM(s) Oral daily  furosemide    Tablet 40 milliGRAM(s) Oral daily  glucagon  Injectable 1 milliGRAM(s) IntraMuscular once  insulin glargine Injectable (LANTUS) 20 Unit(s) SubCutaneous at bedtime  insulin glargine Injectable (LANTUS) 20 Unit(s) SubCutaneous every morning  insulin lispro (ADMELOG) corrective regimen sliding scale   SubCutaneous at bedtime  insulin lispro (ADMELOG) corrective regimen sliding scale   SubCutaneous three times a day before meals  insulin lispro Injectable (ADMELOG) 8 Unit(s) SubCutaneous three times a day before meals  ketotifen 0.025% Ophthalmic Solution 1 Drop(s) Both EYES two times a day  loratadine 10 milliGRAM(s) Oral daily  nystatin Powder 1 Application(s) Topical every 12 hours  oxyCODONE  ER Tablet 60 milliGRAM(s) Oral every 12 hours  polyethylene glycol 3350 17 Gram(s) Oral daily    MEDICATIONS  (PRN):  albuterol    0.083% 2.5 milliGRAM(s) Nebulizer every 4 hours PRN Shortness of Breath and/or Wheezing  cyclobenzaprine 5 milliGRAM(s) Oral three times a day PRN Muscle Spasm  dextrose Oral Gel 15 Gram(s) Oral once PRN Blood Glucose LESS THAN 70 milliGRAM(s)/deciliter  lactulose Syrup 20 Gram(s) Oral every 8 hours PRN Constipation  oxyCODONE    IR 5 milliGRAM(s) Oral every 8 hours PRN Severe Pain (7 - 10)                                                  RADIOLOGY & ADDITIONAL TESTS:  Results Reviewed: Y  Imaging Personally Reviewed: N  Electrocardiogram Personally Reviewed: N                                          
Hospitalist Progress Note    Chief Complaint:  Dyspnea, Elevated, troponin    SUBJECTIVE / OVERNIGHT EVENTS:  No events overnight, patient seen at bedside, in NAD, No major complaints at this time. Patient denies chest pain, abd pain, N/V, fever, chills, dysuria or any other complaints. All remainder ROS negative.     MEDICATIONS  (STANDING):  albuterol/ipratropium for Nebulization 3 milliLiter(s) Nebulizer every 6 hours  apixaban 5 milliGRAM(s) Oral every 12 hours  artificial  tears Solution 1 Drop(s) Both EYES two times a day  aspirin enteric coated 81 milliGRAM(s) Oral daily  atorvastatin 40 milliGRAM(s) Oral at bedtime  budesonide 160 MICROgram(s)/formoterol 4.5 MICROgram(s) Inhaler 2 Puff(s) Inhalation two times a day  cadexomer iodine 0.9% Gel 1 Application(s) Topical daily  chlorhexidine 2% Cloths 1 Application(s) Topical <User Schedule>  cholecalciferol 2000 Unit(s) Oral daily  dextrose 5%. 1000 milliLiter(s) (50 mL/Hr) IV Continuous <Continuous>  dextrose 5%. 1000 milliLiter(s) (100 mL/Hr) IV Continuous <Continuous>  dextrose 50% Injectable 25 Gram(s) IV Push once  dextrose 50% Injectable 12.5 Gram(s) IV Push once  dextrose 50% Injectable 25 Gram(s) IV Push once  DULoxetine 30 milliGRAM(s) Oral daily  furosemide    Tablet 40 milliGRAM(s) Oral daily  glucagon  Injectable 1 milliGRAM(s) IntraMuscular once  insulin glargine Injectable (LANTUS) 20 Unit(s) SubCutaneous at bedtime  insulin glargine Injectable (LANTUS) 20 Unit(s) SubCutaneous every morning  insulin lispro (ADMELOG) corrective regimen sliding scale   SubCutaneous three times a day before meals  insulin lispro (ADMELOG) corrective regimen sliding scale   SubCutaneous at bedtime  ketotifen 0.025% Ophthalmic Solution 1 Drop(s) Both EYES two times a day  loratadine 10 milliGRAM(s) Oral daily  nystatin Powder 1 Application(s) Topical every 12 hours  oxyCODONE  ER Tablet 60 milliGRAM(s) Oral every 12 hours  polyethylene glycol 3350 17 Gram(s) Oral daily    MEDICATIONS  (PRN):  albuterol    0.083% 2.5 milliGRAM(s) Nebulizer every 4 hours PRN Shortness of Breath and/or Wheezing  ALPRAZolam 0.25 milliGRAM(s) Oral every 12 hours PRN Anxiety  cyclobenzaprine 5 milliGRAM(s) Oral three times a day PRN Muscle Spasm  dextrose Oral Gel 15 Gram(s) Oral once PRN Blood Glucose LESS THAN 70 milliGRAM(s)/deciliter  lactulose Syrup 20 Gram(s) Oral every 8 hours PRN Constipation  oxyCODONE    IR 5 milliGRAM(s) Oral every 8 hours PRN Severe Pain (7 - 10)        I&O's Summary      PHYSICAL EXAM:  Vital Signs Last 24 Hrs  T(C): 36.4 (08 Sep 2023 05:27), Max: 36.8 (07 Sep 2023 15:44)  T(F): 97.5 (08 Sep 2023 05:27), Max: 98.3 (07 Sep 2023 15:44)  HR: 76 (08 Sep 2023 09:47) (70 - 84)  BP: 121/55 (08 Sep 2023 05:27) (117/56 - 136/82)  BP(mean): --  RR: 18 (08 Sep 2023 05:27) (18 - 18)  SpO2: 93% (08 Sep 2023 09:47) (92% - 95%)    Parameters below as of 08 Sep 2023 09:47  Patient On (Oxygen Delivery Method): nasal cannula w/ humidification      General: Morbidly obese female in bed not in distress  eyes : PERRL. intact EOM.   HENT: AT, NC. no throat erythema or exudate.  Chest: Symmetric chest rise, difficult to properly auscultate breath sounds due to body habitus   Heart: S1,S2. RRR. no heart murmur. + edema of lower ext. b/L.   Abdomen: soft. non-tender. morbidly obese, chronic skin changes around entire pannus, + BS.   Ext: no calf tenderness. moves all ext. independently  vascular : distal pulses palpable  Neuro: AAO x3. no focal weakness. no speech disorder, cns intact  Skin: rt. abdominal fold area and under rt. breast area with skin maceration, L. abdominal fold / groin area with small open wound dressing in place, stage 1-2 sacral decubitus,  rt. foot above the 5th toe a small laceration noted, mild surrounding erythema, no discharge.  psych : mood ok, no si/hi    LABS:                        11.6   12.24 )-----------( 171      ( 08 Sep 2023 06:09 )             37.3     09-08    136  |  92<L>  |  72.5<H>  ----------------------------<  208<H>  4.5   |  33.0<H>  |  1.98<H>    Ca    10.1      08 Sep 2023 06:09      PTT - ( 07 Sep 2023 06:35 )  PTT:26.7 sec      Urinalysis Basic - ( 08 Sep 2023 06:09 )    Color: x / Appearance: x / SG: x / pH: x  Gluc: 208 mg/dL / Ketone: x  / Bili: x / Urobili: x   Blood: x / Protein: x / Nitrite: x   Leuk Esterase: x / RBC: x / WBC x   Sq Epi: x / Non Sq Epi: x / Bacteria: x        CAPILLARY BLOOD GLUCOSE      POCT Blood Glucose.: 198 mg/dL (08 Sep 2023 07:38)  POCT Blood Glucose.: 260 mg/dL (07 Sep 2023 21:28)  POCT Blood Glucose.: 261 mg/dL (07 Sep 2023 17:10)  POCT Blood Glucose.: 268 mg/dL (07 Sep 2023 11:48)        RADIOLOGY & ADDITIONAL TESTS:  Results Reviewed: Y  Imaging Personally Reviewed: N  Electrocardiogram Personally Reviewed: RAYMUNDO                                          
[facility] Division of Hospital Medicine    Chief Complaint:      SUBJECTIVE / OVERNIGHT EVENTS:    Patient seen and examined at bedside, no acute events overnight, patient denies any new complaints. Saturating well  (uses 4 at home), remains on Heparin drip per cardiology. Notably D dimer is not truly elevated, age corrected would be > 690, US LE's negative, ECHO shows severe PH, low suspicion for clot however patient is certainly sedentary / stasis high risk for clot. Continues on Diuresis with Lasix 40 mg IV BID.       PHYSICAL EXAM:    Vital Signs Last 24 Hrs  T(C): 36.9 (09-01-23 @ 11:16), Max: 36.9 (09-01-23 @ 11:16)  T(F): 98.5 (09-01-23 @ 11:16), Max: 98.5 (09-01-23 @ 11:16)  HR: 83 (09-01-23 @ 11:16) (75 - 91)  BP: 118/66 (09-01-23 @ 11:16) (118/65 - 132/69)  BP(mean): 86 (09-01-23 @ 04:18) (86 - 86)  RR: 18 (09-01-23 @ 11:16) (16 - 18)  SpO2: 94% (09-01-23 @ 11:16) (94% - 97%)        General: Morbidly obese female in bed not in distress  eyes : PERRL. intact EOM.   HENT: AT, NC. no throat erythema or exudate.  Chest: Symmetric chest rise, difficult to properly auscultate breath sounds due to body habitus   Heart: S1,S2. RRR. no heart murmur. + edema of lower ext. b/L.   Abdomen: soft. non-tender. morbidly obese, chronic skin changes around entire pannus, + BS.   Ext: no calf tenderness. moves all ext. independently  vascular : distal pulses palpable  Neuro: AAO x3. no focal weakness. no speech disorder, cns intact  Skin: rt. abdominal fold area and under rt. breast area with skin maceration, L. abdominal fold / groin area with small open wound dressing in place, stage 1-2 sacral decubitus,  rt. foot above the 5th toe a small laceration noted, mild surrounding erythema, no discharge.  psych : mood ok, no si/hi    LABS:                                     10.5   8.76  )-----------( 210      ( 01 Sep 2023 02:28 )             35.9   09-01    140  |  98  |  39.1<H>  ----------------------------<  134<H>  5.2   |  32.0<H>  |  2.12<H>    Ca    9.0      01 Sep 2023 02:28    TPro  7.0  /  Alb  3.7  /  TBili  0.6  /  DBili  x   /  AST  26  /  ALT  18  /  AlkPhos  222<H>  08-30      MEDICATIONS  (STANDING):  albuterol/ipratropium for Nebulization 3 milliLiter(s) Nebulizer every 6 hours  aspirin enteric coated 81 milliGRAM(s) Oral daily  atorvastatin 40 milliGRAM(s) Oral at bedtime  budesonide 160 MICROgram(s)/formoterol 4.5 MICROgram(s) Inhaler 2 Puff(s) Inhalation two times a day  buMETAnide Injectable 2 milliGRAM(s) IV Push every 12 hours  cadexomer iodine 0.9% Gel 1 Application(s) Topical daily  chlorhexidine 2% Cloths 1 Application(s) Topical <User Schedule>  cholecalciferol 2000 Unit(s) Oral daily  dextrose 5%. 1000 milliLiter(s) (50 mL/Hr) IV Continuous <Continuous>  dextrose 5%. 1000 milliLiter(s) (100 mL/Hr) IV Continuous <Continuous>  dextrose 50% Injectable 25 Gram(s) IV Push once  dextrose 50% Injectable 12.5 Gram(s) IV Push once  dextrose 50% Injectable 25 Gram(s) IV Push once  doxycycline monohydrate Capsule 100 milliGRAM(s) Oral every 12 hours  glucagon  Injectable 1 milliGRAM(s) IntraMuscular once  heparin  Infusion.  Unit(s)/Hr (24 mL/Hr) IV Continuous <Continuous>  insulin glargine Injectable (LANTUS) 15 Unit(s) SubCutaneous every morning  insulin glargine Injectable (LANTUS) 15 Unit(s) SubCutaneous at bedtime  insulin lispro (ADMELOG) corrective regimen sliding scale   SubCutaneous at bedtime  insulin lispro (ADMELOG) corrective regimen sliding scale   SubCutaneous three times a day before meals  loratadine 10 milliGRAM(s) Oral daily  nystatin Powder 1 Application(s) Topical every 12 hours  oxyCODONE  ER Tablet 60 milliGRAM(s) Oral every 12 hours  polyethylene glycol 3350 17 Gram(s) Oral daily  predniSONE   Tablet   Oral   predniSONE   Tablet 30 milliGRAM(s) Oral daily    MEDICATIONS  (PRN):  albuterol    0.083% 2.5 milliGRAM(s) Nebulizer every 4 hours PRN Shortness of Breath and/or Wheezing  dextrose Oral Gel 15 Gram(s) Oral once PRN Blood Glucose LESS THAN 70 milliGRAM(s)/deciliter  heparin   Injectable 5000 Unit(s) IV Push every 6 hours PRN For aPTT between 40 - 57  heparin   Injectable 89033 Unit(s) IV Push every 6 hours PRN For aPTT less than 40                  RADIOLOGY & ADDITIONAL TESTS:  Results Reviewed:   Imaging Personally Reviewed:  Electrocardiogram Personally Reviewed:  
HPI:  69 y/o morbidly obese female with h/o asthma on home o2, HFp EF, spinal stenosis, dm, ckd, , dvt lt. leg in 2004, treated and resolved came in for increasing sob for past 1 week. pt. stated that she has baseline chronic sob but last week was more than her baseline. As per pt. she gets oob and uses walker. no cp. no abd. pain . no n/v/d. no fever. pt. stated that she recently got a  small cut on her rt. foot  while getting OOB, podiatrist came to the house and glu was applied to the cut and her doctor put her on augmentin for 10 days which she used for 3 days but today came to the ER. no other complaints reported.   In the ER  first trop 0.09 , d dimer 407, due to pt's weight she exceeds the wt. limit for ct chest so ER physician started pt. on iv heparin. Lower ext. doppler has been ordered by the ER physician as well.  (30 Aug 2023 18:41)      Podiatry HPI  Patient is a 68F with extensive pmhx as noted above with complaint of Right hallux laceration she sustained last Friday 8/25 after banging her toe against a metal bed frame. She stated her home podiatrist visited (Dr. Mccloud) who applied surgical glue to the cut and placed her on augmentin which she has been on prior to visiting ED. Patient denies any drainage from the wound and states that sutures were not put in since her podiatrist believed they may have ripped out upon wt bearing. Patient ambulates minimally and states she sometimes moves from bed to chair. Patient states she has had a Left heel ulceration for years which required past hospitalizations for abx. States her podiatrist has been taking care of that at home as well and denies any concern for infection for left heel wound. Patient denies any n/v/f however is being admitted for chest pain/SOB.     Medications albuterol    0.083% 2.5 milliGRAM(s) Nebulizer every 4 hours PRN  albuterol/ipratropium for Nebulization 3 milliLiter(s) Nebulizer every 6 hours  aspirin enteric coated 81 milliGRAM(s) Oral daily  atorvastatin 40 milliGRAM(s) Oral at bedtime  budesonide 160 MICROgram(s)/formoterol 4.5 MICROgram(s) Inhaler 2 Puff(s) Inhalation two times a day  chlorhexidine 2% Cloths 1 Application(s) Topical <User Schedule>  cholecalciferol 2000 Unit(s) Oral daily  dextrose 5%. 1000 milliLiter(s) IV Continuous <Continuous>  dextrose 5%. 1000 milliLiter(s) IV Continuous <Continuous>  dextrose 50% Injectable 12.5 Gram(s) IV Push once  dextrose 50% Injectable 25 Gram(s) IV Push once  dextrose 50% Injectable 25 Gram(s) IV Push once  dextrose Oral Gel 15 Gram(s) Oral once PRN  doxycycline monohydrate Capsule 100 milliGRAM(s) Oral every 12 hours  furosemide   Injectable 40 milliGRAM(s) IV Push every 12 hours  glucagon  Injectable 1 milliGRAM(s) IntraMuscular once  heparin   Injectable 05261 Unit(s) IV Push every 6 hours PRN  heparin   Injectable 5000 Unit(s) IV Push every 6 hours PRN  heparin  Infusion.  Unit(s)/Hr IV Continuous <Continuous>  insulin glargine Injectable (LANTUS) 15 Unit(s) SubCutaneous every morning  insulin glargine Injectable (LANTUS) 15 Unit(s) SubCutaneous at bedtime  insulin lispro (ADMELOG) corrective regimen sliding scale   SubCutaneous at bedtime  insulin lispro (ADMELOG) corrective regimen sliding scale   SubCutaneous three times a day before meals  loratadine 10 milliGRAM(s) Oral daily  nystatin Powder 1 Application(s) Topical every 12 hours  oxyCODONE  ER Tablet 60 milliGRAM(s) Oral every 12 hours  polyethylene glycol 3350 17 Gram(s) Oral daily  predniSONE   Tablet   Oral   predniSONE   Tablet 30 milliGRAM(s) Oral daily    FHFamily history of pancreatic cancer    Family history of bladder cancer    Family history of lung cancer (Grandparent)    ,   PMHDiabetes Mellitus Type II    HTN (Hypertension)    Endometrial Hyperplasia    Cervical Stenosis of Spine    Spinal Stenosis, Lumbar    Deep Vein Thrombosis (DVT)    Dyslipidemia    Cataract    Morbid Obesity    Vitamin D deficiency    Edema of lower extremity    Insomnia    CKD (chronic kidney disease)    Narrow angle glaucoma of left eye    Other fecal abnormalities    Congestive heart failure    Uterine cancer    Asthma    On home oxygen therapy    Gait difficulty       PSHCataract extracted with lens implant 1998    C Section 1994    Cholecystectomy/appendectomy @ age 25    D&C x2 1980's    D&C 2008    Cervical Spinal Stenosis surgery x2 (01/2002, 7/2002)    Tonsillectomy as a child    Endometrial biopsy 12/02/09    H/O laser iridotomy    H/O colonoscopy    S/P total abdominal hysterectomy and bilateral salpingo-oophorectomy    S/P appendectomy    S/P cholecystectomy        Labs                          10.5   8.76  )-----------( 210      ( 01 Sep 2023 02:28 )             35.9      09-01    140  |  98  |  39.1<H>  ----------------------------<  134<H>  5.2   |  32.0<H>  |  2.12<H>    Ca    9.0      01 Sep 2023 02:28    TPro  7.0  /  Alb  3.7  /  TBili  0.6  /  DBili  x   /  AST  26  /  ALT  18  /  AlkPhos  222<H>  08-30     Vital Signs Last 24 Hrs  T(C): 36.6 (01 Sep 2023 04:18), Max: 36.6 (31 Aug 2023 21:47)  T(F): 97.9 (01 Sep 2023 04:18), Max: 97.9 (31 Aug 2023 21:47)  HR: 75 (01 Sep 2023 04:18) (71 - 91)  BP: 129/65 (01 Sep 2023 04:18) (118/65 - 132/69)  BP(mean): 86 (01 Sep 2023 04:18) (78 - 86)  RR: 18 (01 Sep 2023 04:18) (16 - 19)  SpO2: 94% (01 Sep 2023 04:18) (94% - 97%)    Parameters below as of 01 Sep 2023 04:18  Patient On (Oxygen Delivery Method): nasal cannula  O2 Flow (L/min): 5            WBC Count: 8.76 K/uL (09-01-23 @ 02:28)  WBC Count: 8.76 K/uL (09-01-23 @ 02:28)  WBC Count: 9.10 K/uL (08-31-23 @ 10:25)    PHYSICAL EXAM  LE Focused:    Vasc:  DP and PT faintly palpable 1/4 with pitting edema +1 to b/l LE, skin tg warm to warm b/l Le prox to dist, no streaking or erythema noted to b/l LE   Derm: Right hallux laceration measures 1cm x 0.1cm x 0.1cm noted sub IPJ minimally closed with surgical glue with granular wound base and no drainage noted or PTB. No odor or crepitation. Left lateral heel fibrotic wound noted meausuring approx [id number] no PTB, no flucutance or crepitation, or concern for infecti  Neuro: Protective sensation grossly diminished   MSK: non functional ambulation, pain on palpation RLE calf  
Hospitalist Progress Note    Chief Complaint:  Dyspnea, Elevated, troponin    SUBJECTIVE / OVERNIGHT EVENTS:  No events overnight, patient seen at bedside, in NAD, No major complaints at this time. Patient denies chest pain, abd pain, N/V, fever, chills, dysuria or any other complaints. All remainder ROS negative.       Vital Signs Last 24 Hrs  T(C): 36.3 (11 Sep 2023 10:56), Max: 36.6 (10 Sep 2023 17:18)  T(F): 97.4 (11 Sep 2023 10:56), Max: 97.8 (10 Sep 2023 17:18)  HR: 77 (11 Sep 2023 10:56) (65 - 77)  BP: 149/68 (11 Sep 2023 10:56) (111/66 - 149/68)  BP(mean): --  RR: 18 (11 Sep 2023 10:56) (18 - 18)  SpO2: 93% (11 Sep 2023 10:56) (93% - 98%)    Parameters below as of 11 Sep 2023 10:56  Patient On (Oxygen Delivery Method): nasal cannula  O2 Flow (L/min): 4        General: Morbidly obese female in bed not in distress  eyes : PERRL. intact EOM.   HENT: AT, NC. no throat erythema or exudate.  Chest: Symmetric chest rise, difficult to properly auscultate breath sounds due to body habitus   Heart: S1,S2. RRR. no heart murmur. + edema of lower ext. b/L.   Ext: bilateral non-pitting stasis edema, stasis dermatitis  vascular : distal pulses palpable  Neuro: AAO x3. no focal weakness    LABS:                                                 12.2   9.50  )-----------( 163      ( 11 Sep 2023 05:18 )             41.5     MEDICATIONS  (STANDING):  albuterol/ipratropium for Nebulization 3 milliLiter(s) Nebulizer every 6 hours  apixaban 5 milliGRAM(s) Oral every 12 hours  artificial  tears Solution 1 Drop(s) Both EYES two times a day  aspirin enteric coated 81 milliGRAM(s) Oral daily  atorvastatin 40 milliGRAM(s) Oral at bedtime  budesonide 160 MICROgram(s)/formoterol 4.5 MICROgram(s) Inhaler 2 Puff(s) Inhalation two times a day  cadexomer iodine 0.9% Gel 1 Application(s) Topical daily  chlorhexidine 2% Cloths 1 Application(s) Topical <User Schedule>  cholecalciferol 2000 Unit(s) Oral daily  dextrose 5%. 1000 milliLiter(s) (50 mL/Hr) IV Continuous <Continuous>  dextrose 5%. 1000 milliLiter(s) (100 mL/Hr) IV Continuous <Continuous>  dextrose 50% Injectable 12.5 Gram(s) IV Push once  dextrose 50% Injectable 25 Gram(s) IV Push once  dextrose 50% Injectable 25 Gram(s) IV Push once  DULoxetine 30 milliGRAM(s) Oral daily  furosemide    Tablet 40 milliGRAM(s) Oral daily  glucagon  Injectable 1 milliGRAM(s) IntraMuscular once  insulin glargine Injectable (LANTUS) 20 Unit(s) SubCutaneous every morning  insulin glargine Injectable (LANTUS) 20 Unit(s) SubCutaneous at bedtime  insulin lispro (ADMELOG) corrective regimen sliding scale   SubCutaneous at bedtime  insulin lispro (ADMELOG) corrective regimen sliding scale   SubCutaneous three times a day before meals  insulin lispro Injectable (ADMELOG) 8 Unit(s) SubCutaneous three times a day before meals  ketotifen 0.025% Ophthalmic Solution 1 Drop(s) Both EYES two times a day  loratadine 10 milliGRAM(s) Oral daily  nystatin Powder 1 Application(s) Topical every 12 hours  oxyCODONE  ER Tablet 60 milliGRAM(s) Oral every 12 hours  polyethylene glycol 3350 17 Gram(s) Oral daily    MEDICATIONS  (PRN):  albuterol    0.083% 2.5 milliGRAM(s) Nebulizer every 4 hours PRN Shortness of Breath and/or Wheezing  cyclobenzaprine 5 milliGRAM(s) Oral three times a day PRN Muscle Spasm  dextrose Oral Gel 15 Gram(s) Oral once PRN Blood Glucose LESS THAN 70 milliGRAM(s)/deciliter  lactulose Syrup 20 Gram(s) Oral every 8 hours PRN Constipation  oxyCODONE    IR 5 milliGRAM(s) Oral every 8 hours PRN Severe Pain (7 - 10)                                   RADIOLOGY & ADDITIONAL TESTS:  Results Reviewed: Y  Imaging Personally Reviewed: N  Electrocardiogram Personally Reviewed: N                                          
Hospitalist Progress Note    Chief Complaint:  Dyspnea, Elevated, troponin    SUBJECTIVE / OVERNIGHT EVENTS:  No events overnight, patient seen at bedside, in NAD, No major complaints at this time. Patient denies chest pain, abd pain, N/V, fever, chills, dysuria or any other complaints. All remainder ROS negative.       Vital Signs Last 24 Hrs  T(C): 36.5 (09 Sep 2023 11:08), Max: 36.7 (08 Sep 2023 16:38)  T(F): 97.7 (09 Sep 2023 11:08), Max: 98 (08 Sep 2023 16:38)  HR: 74 (09 Sep 2023 14:55) (66 - 83)  BP: 111/61 (09 Sep 2023 11:08) (111/61 - 147/68)  BP(mean): --  RR: 18 (09 Sep 2023 11:08) (18 - 18)  SpO2: 95% (09 Sep 2023 14:55) (90% - 95%)    Parameters below as of 09 Sep 2023 14:55  Patient On (Oxygen Delivery Method): nasal cannula, 4L        General: Morbidly obese female in bed not in distress  eyes : PERRL. intact EOM.   HENT: AT, NC. no throat erythema or exudate.  Chest: Symmetric chest rise, difficult to properly auscultate breath sounds due to body habitus   Heart: S1,S2. RRR. no heart murmur. + edema of lower ext. b/L.   Abdomen: soft. non-tender. morbidly obese, chronic skin changes around entire pannus, + BS.   Ext: no calf tenderness  vascular : distal pulses palpable  Neuro: AAO x3. no focal weakness. no speech disorder, cns intact    LABS:                                          12.1   11.32 )-----------( 173      ( 09 Sep 2023 06:50 )             40.3   09-09    138  |  92<L>  |  69.9<H>  ----------------------------<  194<H>  4.6   |  33.0<H>  |  1.92<H>    Ca    10.0      09 Sep 2023 06:50        MEDICATIONS  (STANDING):  albuterol/ipratropium for Nebulization 3 milliLiter(s) Nebulizer every 6 hours  apixaban 5 milliGRAM(s) Oral every 12 hours  artificial  tears Solution 1 Drop(s) Both EYES two times a day  aspirin enteric coated 81 milliGRAM(s) Oral daily  atorvastatin 40 milliGRAM(s) Oral at bedtime  budesonide 160 MICROgram(s)/formoterol 4.5 MICROgram(s) Inhaler 2 Puff(s) Inhalation two times a day  cadexomer iodine 0.9% Gel 1 Application(s) Topical daily  chlorhexidine 2% Cloths 1 Application(s) Topical <User Schedule>  cholecalciferol 2000 Unit(s) Oral daily  dextrose 5%. 1000 milliLiter(s) (50 mL/Hr) IV Continuous <Continuous>  dextrose 5%. 1000 milliLiter(s) (100 mL/Hr) IV Continuous <Continuous>  dextrose 50% Injectable 25 Gram(s) IV Push once  dextrose 50% Injectable 12.5 Gram(s) IV Push once  dextrose 50% Injectable 25 Gram(s) IV Push once  DULoxetine 30 milliGRAM(s) Oral daily  furosemide    Tablet 40 milliGRAM(s) Oral daily  glucagon  Injectable 1 milliGRAM(s) IntraMuscular once  insulin glargine Injectable (LANTUS) 20 Unit(s) SubCutaneous every morning  insulin glargine Injectable (LANTUS) 20 Unit(s) SubCutaneous at bedtime  insulin lispro (ADMELOG) corrective regimen sliding scale   SubCutaneous at bedtime  insulin lispro (ADMELOG) corrective regimen sliding scale   SubCutaneous three times a day before meals  ketotifen 0.025% Ophthalmic Solution 1 Drop(s) Both EYES two times a day  loratadine 10 milliGRAM(s) Oral daily  nystatin Powder 1 Application(s) Topical every 12 hours  oxyCODONE  ER Tablet 60 milliGRAM(s) Oral every 12 hours  polyethylene glycol 3350 17 Gram(s) Oral daily    MEDICATIONS  (PRN):  albuterol    0.083% 2.5 milliGRAM(s) Nebulizer every 4 hours PRN Shortness of Breath and/or Wheezing  ALPRAZolam 0.25 milliGRAM(s) Oral every 12 hours PRN Anxiety  cyclobenzaprine 5 milliGRAM(s) Oral three times a day PRN Muscle Spasm  dextrose Oral Gel 15 Gram(s) Oral once PRN Blood Glucose LESS THAN 70 milliGRAM(s)/deciliter  lactulose Syrup 20 Gram(s) Oral every 8 hours PRN Constipation  oxyCODONE    IR 5 milliGRAM(s) Oral every 8 hours PRN Severe Pain (7 - 10)    RADIOLOGY & ADDITIONAL TESTS:  Results Reviewed: Y  Imaging Personally Reviewed: N  Electrocardiogram Personally Reviewed: RAYMUNDO                                          
Interval Hx:  Patient seen during rounds  Patient reports pain to be controlled on current medications  Patient denies sedation with medications     Analgesic Dosing for past 24 hours reviewed as below:    cyclobenzaprine   5 milliGRAM(s) Oral (09-06-23 @ 14:40)    DULoxetine   30 milliGRAM(s) Oral (09-06-23 @ 12:21)    oxyCODONE    IR   5 milliGRAM(s) Oral (09-07-23 @ 05:34)   5 milliGRAM(s) Oral (09-06-23 @ 21:53)    oxyCODONE  ER Tablet   60 milliGRAM(s) Oral (09-06-23 @ 17:38)          T(C): 36.7 (09-06-23 @ 21:40), Max: 36.8 (09-06-23 @ 14:30)  HR: 74 (09-07-23 @ 08:50) (72 - 81)  BP: 135/66 (09-06-23 @ 21:40) (119/65 - 135/66)  RR: 18 (09-06-23 @ 21:40) (18 - 18)  SpO2: 95% (09-07-23 @ 10:32) (91% - 95%)      09-06-23 @ 07:01  -  09-07-23 @ 07:00  --------------------------------------------------------  IN: 0 mL / OUT: 3600 mL / NET: -3600 mL        albuterol    0.083% 2.5 milliGRAM(s) Nebulizer every 4 hours PRN  albuterol/ipratropium for Nebulization 3 milliLiter(s) Nebulizer every 6 hours  ALPRAZolam 0.25 milliGRAM(s) Oral every 12 hours PRN  apixaban 5 milliGRAM(s) Oral every 12 hours  artificial  tears Solution 1 Drop(s) Both EYES two times a day  aspirin enteric coated 81 milliGRAM(s) Oral daily  atorvastatin 40 milliGRAM(s) Oral at bedtime  budesonide 160 MICROgram(s)/formoterol 4.5 MICROgram(s) Inhaler 2 Puff(s) Inhalation two times a day  cadexomer iodine 0.9% Gel 1 Application(s) Topical daily  chlorhexidine 2% Cloths 1 Application(s) Topical <User Schedule>  cholecalciferol 2000 Unit(s) Oral daily  cyclobenzaprine 5 milliGRAM(s) Oral three times a day PRN  dextrose 5%. 1000 milliLiter(s) IV Continuous <Continuous>  dextrose 5%. 1000 milliLiter(s) IV Continuous <Continuous>  dextrose 50% Injectable 25 Gram(s) IV Push once  dextrose 50% Injectable 25 Gram(s) IV Push once  dextrose 50% Injectable 12.5 Gram(s) IV Push once  dextrose Oral Gel 15 Gram(s) Oral once PRN  DULoxetine 30 milliGRAM(s) Oral daily  furosemide    Tablet 40 milliGRAM(s) Oral daily  glucagon  Injectable 1 milliGRAM(s) IntraMuscular once  insulin glargine Injectable (LANTUS) 20 Unit(s) SubCutaneous every morning  insulin glargine Injectable (LANTUS) 20 Unit(s) SubCutaneous at bedtime  insulin lispro (ADMELOG) corrective regimen sliding scale   SubCutaneous at bedtime  insulin lispro (ADMELOG) corrective regimen sliding scale   SubCutaneous three times a day before meals  ketotifen 0.025% Ophthalmic Solution 1 Drop(s) Both EYES two times a day  lactulose Syrup 20 Gram(s) Oral every 8 hours PRN  loratadine 10 milliGRAM(s) Oral daily  nystatin Powder 1 Application(s) Topical every 12 hours  oxyCODONE    IR 5 milliGRAM(s) Oral every 8 hours PRN  oxyCODONE  ER Tablet 60 milliGRAM(s) Oral every 12 hours  polyethylene glycol 3350 17 Gram(s) Oral daily                          12.2   13.11 )-----------( 182      ( 07 Sep 2023 06:35 )             38.8     09-07    142  |  95<L>  |  71.2<H>  ----------------------------<  252<H>  4.8   |  33.0<H>  |  2.04<H>    Ca    10.3      07 Sep 2023 06:35      PTT - ( 07 Sep 2023 06:35 )  PTT:26.7 sec  Urinalysis Basic - ( 07 Sep 2023 06:35 )    Color: x / Appearance: x / SG: x / pH: x  Gluc: 252 mg/dL / Ketone: x  / Bili: x / Urobili: x   Blood: x / Protein: x / Nitrite: x   Leuk Esterase: x / RBC: x / WBC x   Sq Epi: x / Non Sq Epi: x / Bacteria: x        Pain Service   [phone]
                                                         [facility]                                                         CARDIOLOGY AT [facility]                                                                  [address], [place] [address]                                                         Telephone: [phone]. Fax:[phone]                                                                             PROGRESS NOTE    Reason for follow up: HFpEF, Severe Pulmonary HTN  Update: Patient's echocardiogram showed EF 55-60%, grade Ii DD, moderately reduced RV function, mild mR, severe pulmonary HTN with PASP 70. Patient is unable to have R/LHC at this time for elevated troponin and severe pulmonary HTN due to cath table weight limit. Patient is negative 1 L on IV lasix.       Review of symptoms:   Cardiac:  No chest pain. + dyspnea. No palpitations.  Respiratory: no cough. + dyspnea  Gastrointestinal: No diarrhea. No abdominal pain. No bleeding.   Neuro: No focal neuro complaints.    Vitals:  T(C): 36.9 (09-01-23 @ 11:16), Max: 36.9 (09-01-23 @ 11:16)  HR: 83 (09-01-23 @ 11:16) (75 - 91)  BP: 118/66 (09-01-23 @ 11:16) (118/65 - 132/69)  RR: 18 (09-01-23 @ 11:16) (16 - 18)  SpO2: 94% (09-01-23 @ 11:16) (94% - 97%)  Wt(kg): --  I&O's Summary    31 Aug 2023 07:01  -  01 Sep 2023 07:00  --------------------------------------------------------  IN: 544 mL / OUT: 1500 mL / NET: -956 mL      Weight (kg): 261 (08-30 @ 16:20)    PHYSICAL EXAM:  Appearance: Comfortable. No acute distress  HEENT:  Atraumatic. Normocephalic.  Normal oral mucosa  Neurologic: A & O x 3, no gross focal deficits.  Cardiovascular: RRR S1 S2, No murmur, no rubs/gallops. No JVD  Respiratory: Decreased b/l bases  Gastrointestinal:  Soft, Non-tender, + BS  Lower Extremities: 2+ Peripheral Pulses, No clubbing, cyanosis, 4+ b/l edema  Psychiatry: Patient is calm. No agitation.   Skin: warm and dry.    CURRENT CARDIAC MEDICATIONS:  buMETAnide Injectable 2 milliGRAM(s) IV Push every 12 hours      CURRENT OTHER MEDICATIONS:  albuterol    0.083% 2.5 milliGRAM(s) Nebulizer every 4 hours PRN Shortness of Breath and/or Wheezing  albuterol/ipratropium for Nebulization 3 milliLiter(s) Nebulizer every 6 hours  budesonide 160 MICROgram(s)/formoterol 4.5 MICROgram(s) Inhaler 2 Puff(s) Inhalation two times a day  loratadine 10 milliGRAM(s) Oral daily  doxycycline monohydrate Capsule 100 milliGRAM(s) Oral every 12 hours  oxyCODONE  ER Tablet 60 milliGRAM(s) Oral every 12 hours  polyethylene glycol 3350 17 Gram(s) Oral daily  atorvastatin 40 milliGRAM(s) Oral at bedtime  dextrose 50% Injectable 25 Gram(s) IV Push once, Stop order after: 1 Doses  dextrose 50% Injectable 12.5 Gram(s) IV Push once, Stop order after: 1 Doses  dextrose 50% Injectable 25 Gram(s) IV Push once, Stop order after: 1 Doses  dextrose Oral Gel 15 Gram(s) Oral once, Stop order after: 1 Doses PRN Blood Glucose LESS THAN 70 milliGRAM(s)/deciliter  glucagon  Injectable 1 milliGRAM(s) IntraMuscular once, Stop order after: 1 Doses  insulin glargine Injectable (LANTUS) 15 Unit(s) SubCutaneous at bedtime  insulin glargine Injectable (LANTUS) 15 Unit(s) SubCutaneous every morning  insulin lispro (ADMELOG) corrective regimen sliding scale   SubCutaneous three times a day before meals  insulin lispro (ADMELOG) corrective regimen sliding scale   SubCutaneous at bedtime  predniSONE   Tablet   Oral   predniSONE   Tablet 30 milliGRAM(s) Oral daily, Stop order after: 2 Days  aspirin enteric coated 81 milliGRAM(s) Oral daily  cadexomer iodine 0.9% Gel 1 Application(s) Topical daily  chlorhexidine 2% Cloths 1 Application(s) Topical <User Schedule>  cholecalciferol 2000 Unit(s) Oral daily  dextrose 5%. 1000 milliLiter(s) (50 mL/Hr) IV Continuous <Continuous>  dextrose 5%. 1000 milliLiter(s) (100 mL/Hr) IV Continuous <Continuous>  heparin   Injectable 5000 Unit(s) IV Push every 6 hours PRN For aPTT between 40 - 57  heparin   Injectable 43808 Unit(s) IV Push every 6 hours PRN For aPTT less than 40  heparin  Infusion.  Unit(s)/Hr (24 mL/Hr) IV Continuous <Continuous>  nystatin Powder 1 Application(s) Topical every 12 hours      LABS:	 	  ( 31 Aug 2023 10:25 )  Troponin T  0.06 ,  CPK  X    , CKMB  X    , BNP X        , ( 31 Aug 2023 00:43 )  Troponin T  0.08<H>,  CPK  X    , CKMB  X    , BNP X        , ( 30 Aug 2023 17:13 )  Troponin T  0.09<H>,  CPK  X    , CKMB  X    , BNP X                                  10.5   8.76  )-----------( 210      ( 01 Sep 2023 02:28 )             35.9     09-01    140  |  98  |  39.1<H>  ----------------------------<  134<H>  5.2   |  32.0<H>  |  2.12<H>    Ca    9.0      01 Sep 2023 02:28    TPro  7.0  /  Alb  3.7  /  TBili  0.6  /  DBili  x   /  AST  26  /  ALT  18  /  AlkPhos  222<H>  08-30    PT/INR/PTT ( 01 Sep 2023 10:11 )                       :                       :      X            :       61.9                  .        .                   .              .           .       X           .                                       Lipid Profile:   HgA1c:   TSH:     TELEMETRY: SR, PACs  ECG:    DIAGNOSTIC TESTING:  [ ] Echocardiogram:     < from: TTE Echo Complete w/ Contrast w/ Doppler (08.31.23 @ 08:19) >  PHYSICIAN INTERPRETATION:  Left Ventricle: Endocardial visualization was enhanced with intravenous   echo contrast. The left ventricular internal cavity size is normal.  Global LV systolic function was normal. Left ventricular ejection   fraction, by visual estimation, is 55 to 60%. The interventricular septum   is flattened in systole and diastole, consistent with right ventricular   pressure and volume overload. Spectral Doppler shows pseudonormal pattern   of left ventricular myocardial filling (Grade II diastolic dysfunction).  Right Ventricle: The right ventricular size is moderately enlarged. RV   systolic function is moderately reduced.  Left Atrium: The left atrium is normal in size.  Right Atrium: The right atrium was not well visualized.  Pericardium: There is no evidence of pericardial effusion.  Mitral Valve: Mild thickening and calcification of the anterior and   posterior mitral valve leaflets. There is mild mitral annular   calcification. Mild mitral valve regurgitation is seen.  Tricuspid Valve: The tricuspid valve is not well seen. Moderate tricuspid   regurgitation is visualized. Estimated pulmonary artery systolic pressure   is 70.9 mmHg assuming a right atrial pressure of 5 mmHg, which is   consistent with severe pulmonary hypertension.  Aortic Valve: The aortic valve was not well visualized. Sclerotic aortic   valve with normal opening. No evidence of aortic valve regurgitation is   seen.  Pulmonic Valve: The pulmonic valve was not well visualized.  Pulmonary Artery: The pulmonary artery is not well seen.  In comparison to the previous echocardiogram(s): Prior examinationsare   available and were reviewed for comparison purposes.      Summary:   1. Technically limited study.   2. Endocardial visualization was enhanced with intravenous echo contrast.   3. Left ventricular ejection fraction, by visual estimation, is 55 to   60%.   4. Grossly Normal global left ventricular systolic function.   5. Spectral Doppler shows pseudonormal pattern of left ventricular   myocardial filling (Grade II diastolic dysfunction).   6. Moderately enlarged right ventricle.   7. Moderately reduced RV systolic function on limited views.   8. The left atrium is normal in size.   9. Mild mitral annular calcification.  10. Mild thickening and calcification of the anterior and posterior   mitral valve leaflets.  11. Mild mitral valve regurgitation.  12. Moderate tricuspid regurgitation.  13. Estimated pulmonary artery systolic pressure is 70.9 mmHg assuming a   right atrial pressure of 5 mmHg, which is consistent with severe   pulmonary hypertension.  14. There is no evidence of pericardial effusion.    MD Kathryn Electronically signed on 8/31/2023 at 1:11:31 PM    < end of copied text >    [ ]  Catheterization:    [ ] Stress Test:    < from: Stress Echocardiogram (07.28.20 @ 10:55) >  Exam Protocol: Dobutamine was infused in increasing doses up to a maximum of 30 mcg/kg/min.  Patient Tolerance: The patient developed no symptoms during the stress exam. The resting heart rate was 82 beats per minute. The target heart rate = 131 bpm. Peak heart rate was 136 bpm. 89% of age matched maximum predicted heart rate was reached. BP at rest was 142/69, at peak 136/88. This is a normal BP response to this modality of testing.      EKG:The resting heart rate was 82 beats per minute. Resting EKG showed normal sinus rhythm at a rate of 82 beats per minute, with nonspecific ST-T wave changes. There was 1.0 mm of upsloping ST segment depression in leads II and AVF during the peak stress period. The ST-T wave abnormalities resolved by 5 minutes into recovery.      2D Echo Findings: There were no stress-induced wall motion abnormalities. This is a negative stress echo test for ischemia. Despite the above described EKG changes, there were no corresponding echocardiographic signs of ischemia.    Summary:   1. Definity Echo contrast was used. Difficult study due to patients body habitus.   2. Negative stress echo for ischemia. Despite EKG changes, there were no regional wall motion abnormalities.   3. Rare PVCs were seen.   4. Elevated PASP at rest and at peak of infusion.      Brooks EUBANKS Electronically signed on 7/28/2020 at 4:09:14 PM    < end of copied text >    OTHER: 	      
Hospitalist Progress Note    Chief Complaint:  Dyspnea, Elevated, troponin    SUBJECTIVE / OVERNIGHT EVENTS:  No events overnight, patient seen at bedside, in NAD, No major complaints at this time. Patient denies chest pain, abd pain, N/V, fever, chills, dysuria or any other complaints. All remainder ROS negative.       Vital Signs Last 24 Hrs  T(C): 36.7 (13 Sep 2023 11:52), Max: 36.8 (12 Sep 2023 17:06)  T(F): 98 (13 Sep 2023 11:52), Max: 98.2 (12 Sep 2023 17:06)  HR: 74 (13 Sep 2023 11:52) (68 - 80)  BP: 117/67 (13 Sep 2023 11:52) (109/55 - 119/66)  BP(mean): --  RR: 18 (13 Sep 2023 11:52) (18 - 18)  SpO2: 93% (13 Sep 2023 11:52) (93% - 97%)    Parameters below as of 13 Sep 2023 11:52  Patient On (Oxygen Delivery Method): nasal cannula  O2 Flow (L/min): 4            General: Morbidly obese female in bed not in distress  eyes : PERRL. intact EOM.   HENT: AT, NC.   Chest: Symmetric chest rise, difficult to properly auscultate breath sounds due to body habitus   Heart: S1,S2. RRR. no heart murmur. + edema of lower ext. b/L.   Ext: bilateral non-pitting stasis edema, stasis dermatitis  Neuro: AAO x3. no focal weakness    LABS:                                               11.7   8.41  )-----------( 180      ( 13 Sep 2023 05:17 )             38.0   09-13    141  |  96  |  59.0<H>  ----------------------------<  138<H>  4.2   |  32.0<H>  |  1.81<H>    Ca    9.4      13 Sep 2023 05:17      MEDICATIONS  (STANDING):  albuterol/ipratropium for Nebulization 3 milliLiter(s) Nebulizer every 6 hours  apixaban 5 milliGRAM(s) Oral every 12 hours  artificial  tears Solution 1 Drop(s) Both EYES two times a day  aspirin enteric coated 81 milliGRAM(s) Oral daily  atorvastatin 40 milliGRAM(s) Oral at bedtime  budesonide 160 MICROgram(s)/formoterol 4.5 MICROgram(s) Inhaler 2 Puff(s) Inhalation two times a day  cadexomer iodine 0.9% Gel 1 Application(s) Topical daily  chlorhexidine 2% Cloths 1 Application(s) Topical <User Schedule>  cholecalciferol 2000 Unit(s) Oral daily  dextrose 5%. 1000 milliLiter(s) (50 mL/Hr) IV Continuous <Continuous>  dextrose 5%. 1000 milliLiter(s) (100 mL/Hr) IV Continuous <Continuous>  dextrose 50% Injectable 25 Gram(s) IV Push once  dextrose 50% Injectable 12.5 Gram(s) IV Push once  dextrose 50% Injectable 25 Gram(s) IV Push once  DULoxetine 30 milliGRAM(s) Oral daily  furosemide    Tablet 40 milliGRAM(s) Oral daily  glucagon  Injectable 1 milliGRAM(s) IntraMuscular once  insulin glargine Injectable (LANTUS) 20 Unit(s) SubCutaneous at bedtime  insulin glargine Injectable (LANTUS) 20 Unit(s) SubCutaneous every morning  insulin lispro (ADMELOG) corrective regimen sliding scale   SubCutaneous three times a day before meals  insulin lispro (ADMELOG) corrective regimen sliding scale   SubCutaneous at bedtime  insulin lispro Injectable (ADMELOG) 8 Unit(s) SubCutaneous three times a day before meals  ketotifen 0.025% Ophthalmic Solution 1 Drop(s) Both EYES two times a day  loratadine 10 milliGRAM(s) Oral daily  nystatin Powder 1 Application(s) Topical every 12 hours  oxyCODONE  ER Tablet 60 milliGRAM(s) Oral every 12 hours  polyethylene glycol 3350 17 Gram(s) Oral daily    MEDICATIONS  (PRN):  albuterol    0.083% 2.5 milliGRAM(s) Nebulizer every 4 hours PRN Shortness of Breath and/or Wheezing  cyclobenzaprine 5 milliGRAM(s) Oral three times a day PRN Muscle Spasm  dextrose Oral Gel 15 Gram(s) Oral once PRN Blood Glucose LESS THAN 70 milliGRAM(s)/deciliter  lactulose Syrup 20 Gram(s) Oral every 8 hours PRN Constipation  oxyCODONE    IR 5 milliGRAM(s) Oral every 8 hours PRN Severe Pain (7 - 10)                                                RADIOLOGY & ADDITIONAL TESTS:  Results Reviewed: Y  Imaging Personally Reviewed: N  Electrocardiogram Personally Reviewed: RAYMUNDO                                          
Hospitalist Progress Note    Chief Complaint:  Dyspnea, Elevated, troponin    SUBJECTIVE / OVERNIGHT EVENTS:  No events overnight, patient seen at bedside, in NAD, No major complaints at this time. Patient denies chest pain, abd pain, N/V, fever, chills, dysuria or any other complaints. All remainder ROS negative.     MEDICATIONS  (STANDING):  albuterol/ipratropium for Nebulization 3 milliLiter(s) Nebulizer every 6 hours  apixaban 5 milliGRAM(s) Oral every 12 hours  artificial  tears Solution 1 Drop(s) Both EYES two times a day  aspirin enteric coated 81 milliGRAM(s) Oral daily  atorvastatin 40 milliGRAM(s) Oral at bedtime  budesonide 160 MICROgram(s)/formoterol 4.5 MICROgram(s) Inhaler 2 Puff(s) Inhalation two times a day  cadexomer iodine 0.9% Gel 1 Application(s) Topical daily  chlorhexidine 2% Cloths 1 Application(s) Topical <User Schedule>  cholecalciferol 2000 Unit(s) Oral daily  dextrose 5%. 1000 milliLiter(s) (50 mL/Hr) IV Continuous <Continuous>  dextrose 5%. 1000 milliLiter(s) (100 mL/Hr) IV Continuous <Continuous>  dextrose 50% Injectable 25 Gram(s) IV Push once  dextrose 50% Injectable 25 Gram(s) IV Push once  dextrose 50% Injectable 12.5 Gram(s) IV Push once  DULoxetine 30 milliGRAM(s) Oral daily  furosemide    Tablet 40 milliGRAM(s) Oral daily  glucagon  Injectable 1 milliGRAM(s) IntraMuscular once  insulin glargine Injectable (LANTUS) 20 Unit(s) SubCutaneous at bedtime  insulin glargine Injectable (LANTUS) 20 Unit(s) SubCutaneous every morning  insulin lispro (ADMELOG) corrective regimen sliding scale   SubCutaneous three times a day before meals  insulin lispro (ADMELOG) corrective regimen sliding scale   SubCutaneous at bedtime  ketotifen 0.025% Ophthalmic Solution 1 Drop(s) Both EYES two times a day  loratadine 10 milliGRAM(s) Oral daily  nystatin Powder 1 Application(s) Topical every 12 hours  oxyCODONE  ER Tablet 60 milliGRAM(s) Oral every 12 hours  polyethylene glycol 3350 17 Gram(s) Oral daily    MEDICATIONS  (PRN):  albuterol    0.083% 2.5 milliGRAM(s) Nebulizer every 4 hours PRN Shortness of Breath and/or Wheezing  ALPRAZolam 0.25 milliGRAM(s) Oral every 12 hours PRN Anxiety  cyclobenzaprine 5 milliGRAM(s) Oral three times a day PRN Muscle Spasm  dextrose Oral Gel 15 Gram(s) Oral once PRN Blood Glucose LESS THAN 70 milliGRAM(s)/deciliter  lactulose Syrup 20 Gram(s) Oral every 8 hours PRN Constipation  oxyCODONE    IR 5 milliGRAM(s) Oral every 8 hours PRN Severe Pain (7 - 10)        I&O's Summary    06 Sep 2023 07:01  -  07 Sep 2023 07:00  --------------------------------------------------------  IN: 0 mL / OUT: 3600 mL / NET: -3600 mL        PHYSICAL EXAM:  Vital Signs Last 24 Hrs  T(C): 36.7 (06 Sep 2023 21:40), Max: 36.8 (06 Sep 2023 14:30)  T(F): 98 (06 Sep 2023 21:40), Max: 98.2 (06 Sep 2023 14:30)  HR: 74 (07 Sep 2023 08:50) (72 - 81)  BP: 135/66 (06 Sep 2023 21:40) (119/65 - 135/66)  BP(mean): --  RR: 18 (06 Sep 2023 21:40) (18 - 18)  SpO2: 95% (07 Sep 2023 10:32) (91% - 95%)    Parameters below as of 07 Sep 2023 08:49  Patient On (Oxygen Delivery Method): nasal cannula, 4LPM           General: Morbidly obese female in bed not in distress  eyes : PERRL. intact EOM.   HENT: AT, NC. no throat erythema or exudate.  Chest: Symmetric chest rise, difficult to properly auscultate breath sounds due to body habitus   Heart: S1,S2. RRR. no heart murmur. + edema of lower ext. b/L.   Abdomen: soft. non-tender. morbidly obese, chronic skin changes around entire pannus, + BS.   Ext: no calf tenderness. moves all ext. independently  vascular : distal pulses palpable  Neuro: AAO x3. no focal weakness. no speech disorder, cns intact  Skin: rt. abdominal fold area and under rt. breast area with skin maceration, L. abdominal fold / groin area with small open wound dressing in place, stage 1-2 sacral decubitus,  rt. foot above the 5th toe a small laceration noted, mild surrounding erythema, no discharge.  psych : mood ok, no si/hi    LABS:                        12.2   13.11 )-----------( 182      ( 07 Sep 2023 06:35 )             38.8     09-07    142  |  95<L>  |  71.2<H>  ----------------------------<  252<H>  4.8   |  33.0<H>  |  2.04<H>    Ca    10.3      07 Sep 2023 06:35      PTT - ( 07 Sep 2023 06:35 )  PTT:26.7 sec      Urinalysis Basic - ( 07 Sep 2023 06:35 )    Color: x / Appearance: x / SG: x / pH: x  Gluc: 252 mg/dL / Ketone: x  / Bili: x / Urobili: x   Blood: x / Protein: x / Nitrite: x   Leuk Esterase: x / RBC: x / WBC x   Sq Epi: x / Non Sq Epi: x / Bacteria: x        CAPILLARY BLOOD GLUCOSE      POCT Blood Glucose.: 268 mg/dL (07 Sep 2023 11:48)  POCT Blood Glucose.: 220 mg/dL (07 Sep 2023 07:52)  POCT Blood Glucose.: 286 mg/dL (06 Sep 2023 21:44)  POCT Blood Glucose.: 372 mg/dL (06 Sep 2023 16:54)  POCT Blood Glucose.: 365 mg/dL (06 Sep 2023 12:12)        RADIOLOGY & ADDITIONAL TESTS:  Results Reviewed: Y  Imaging Personally Reviewed: N  Electrocardiogram Personally Reviewed: N                                          
Hospitalist Progress Note    Chief Complaint:  Dyspnea, Elevated, troponin    SUBJECTIVE / OVERNIGHT EVENTS:  No events overnight, patient seen at bedside, in NAD, No major complaints at this time. Patient denies chest pain, abd pain, N/V, fever, chills, dysuria or any other complaints. All remainder ROS negative.     MEDICATIONS  (STANDING):  albuterol/ipratropium for Nebulization 3 milliLiter(s) Nebulizer every 6 hours  apixaban 5 milliGRAM(s) Oral two times a day  artificial  tears Solution 1 Drop(s) Both EYES two times a day  aspirin enteric coated 81 milliGRAM(s) Oral daily  atorvastatin 40 milliGRAM(s) Oral at bedtime  budesonide 160 MICROgram(s)/formoterol 4.5 MICROgram(s) Inhaler 2 Puff(s) Inhalation two times a day  cadexomer iodine 0.9% Gel 1 Application(s) Topical daily  chlorhexidine 2% Cloths 1 Application(s) Topical <User Schedule>  cholecalciferol 2000 Unit(s) Oral daily  dextrose 5%. 1000 milliLiter(s) (100 mL/Hr) IV Continuous <Continuous>  dextrose 5%. 1000 milliLiter(s) (50 mL/Hr) IV Continuous <Continuous>  dextrose 50% Injectable 12.5 Gram(s) IV Push once  dextrose 50% Injectable 25 Gram(s) IV Push once  dextrose 50% Injectable 25 Gram(s) IV Push once  DULoxetine 30 milliGRAM(s) Oral daily  furosemide    Tablet 40 milliGRAM(s) Oral daily  glucagon  Injectable 1 milliGRAM(s) IntraMuscular once  insulin glargine Injectable (LANTUS) 20 Unit(s) SubCutaneous at bedtime  insulin glargine Injectable (LANTUS) 20 Unit(s) SubCutaneous every morning  insulin lispro (ADMELOG) corrective regimen sliding scale   SubCutaneous at bedtime  insulin lispro (ADMELOG) corrective regimen sliding scale   SubCutaneous three times a day before meals  ketotifen 0.025% Ophthalmic Solution 1 Drop(s) Both EYES two times a day  loratadine 10 milliGRAM(s) Oral daily  nystatin Powder 1 Application(s) Topical every 12 hours  oxyCODONE  ER Tablet 60 milliGRAM(s) Oral every 12 hours  polyethylene glycol 3350 17 Gram(s) Oral daily    MEDICATIONS  (PRN):  albuterol    0.083% 2.5 milliGRAM(s) Nebulizer every 4 hours PRN Shortness of Breath and/or Wheezing  ALPRAZolam 0.25 milliGRAM(s) Oral every 12 hours PRN Anxiety  dextrose Oral Gel 15 Gram(s) Oral once PRN Blood Glucose LESS THAN 70 milliGRAM(s)/deciliter  HYDROmorphone  Injectable 1 milliGRAM(s) IV Push every 3 hours PRN Severe Pain (7 - 10)  lactulose Syrup 20 Gram(s) Oral every 8 hours PRN Constipation        I&O's Summary    05 Sep 2023 07:01  -  06 Sep 2023 07:00  --------------------------------------------------------  IN: 174 mL / OUT: 3700 mL / NET: -3526 mL        PHYSICAL EXAM:  Vital Signs Last 24 Hrs  T(C): 36.8 (06 Sep 2023 04:45), Max: 37 (05 Sep 2023 22:32)  T(F): 98.3 (06 Sep 2023 04:45), Max: 98.6 (05 Sep 2023 22:32)  HR: 77 (06 Sep 2023 10:03) (67 - 83)  BP: 138/63 (06 Sep 2023 04:45) (118/56 - 138/63)  BP(mean): --  RR: 18 (06 Sep 2023 04:45) (16 - 18)  SpO2: 93% (06 Sep 2023 10:03) (91% - 93%)    Parameters below as of 06 Sep 2023 10:03  Patient On (Oxygen Delivery Method): nasal cannula w/ humidification            General: Morbidly obese female in bed not in distress  eyes : PERRL. intact EOM.   HENT: AT, NC. no throat erythema or exudate.  Chest: Symmetric chest rise, difficult to properly auscultate breath sounds due to body habitus   Heart: S1,S2. RRR. no heart murmur. + edema of lower ext. b/L.   Abdomen: soft. non-tender. morbidly obese, chronic skin changes around entire pannus, + BS.   Ext: no calf tenderness. moves all ext. independently  vascular : distal pulses palpable  Neuro: AAO x3. no focal weakness. no speech disorder, cns intact  Skin: rt. abdominal fold area and under rt. breast area with skin maceration, L. abdominal fold / groin area with small open wound dressing in place, stage 1-2 sacral decubitus,  rt. foot above the 5th toe a small laceration noted, mild surrounding erythema, no discharge.  psych : mood ok, no si/hi    LABS:                        11.4   14.06 )-----------( 180      ( 06 Sep 2023 04:25 )             36.7     09-06    136  |  91<L>  |  69.4<H>  ----------------------------<  267<H>  4.9   |  35.0<H>  |  2.18<H>    Ca    10.0      06 Sep 2023 04:25      PTT - ( 06 Sep 2023 04:25 )  PTT:75.2 sec      Urinalysis Basic - ( 06 Sep 2023 04:25 )    Color: x / Appearance: x / SG: x / pH: x  Gluc: 267 mg/dL / Ketone: x  / Bili: x / Urobili: x   Blood: x / Protein: x / Nitrite: x   Leuk Esterase: x / RBC: x / WBC x   Sq Epi: x / Non Sq Epi: x / Bacteria: x        CAPILLARY BLOOD GLUCOSE      POCT Blood Glucose.: 258 mg/dL (06 Sep 2023 07:45)  POCT Blood Glucose.: 285 mg/dL (05 Sep 2023 22:35)  POCT Blood Glucose.: 334 mg/dL (05 Sep 2023 16:54)  POCT Blood Glucose.: 417 mg/dL (05 Sep 2023 11:59)        RADIOLOGY & ADDITIONAL TESTS:  Results Reviewed: Y  Imaging Personally Reviewed: N  Electrocardiogram Personally Reviewed: RAYMUNDO                                          
Hospitalist Progress Note    Chief Complaint:  Dyspnea, Elevated, troponin    SUBJECTIVE / OVERNIGHT EVENTS:  No events overnight, patient seen at bedside, in NAD, complaining of chronic SOB, denotes mild improvement in pain. Patient denies chest pain, abd pain, N/V, fever, chills, dysuria or any other complaints. All remainder ROS negative.       MEDICATIONS  (STANDING):  albuterol/ipratropium for Nebulization 3 milliLiter(s) Nebulizer every 6 hours  artificial  tears Solution 1 Drop(s) Both EYES two times a day  aspirin enteric coated 81 milliGRAM(s) Oral daily  atorvastatin 40 milliGRAM(s) Oral at bedtime  budesonide 160 MICROgram(s)/formoterol 4.5 MICROgram(s) Inhaler 2 Puff(s) Inhalation two times a day  buMETAnide Injectable 2 milliGRAM(s) IV Push every 12 hours  cadexomer iodine 0.9% Gel 1 Application(s) Topical daily  chlorhexidine 2% Cloths 1 Application(s) Topical <User Schedule>  cholecalciferol 2000 Unit(s) Oral daily  dextrose 5%. 1000 milliLiter(s) (50 mL/Hr) IV Continuous <Continuous>  dextrose 5%. 1000 milliLiter(s) (100 mL/Hr) IV Continuous <Continuous>  dextrose 50% Injectable 25 Gram(s) IV Push once  dextrose 50% Injectable 12.5 Gram(s) IV Push once  dextrose 50% Injectable 25 Gram(s) IV Push once  doxycycline monohydrate Capsule 100 milliGRAM(s) Oral every 12 hours  glucagon  Injectable 1 milliGRAM(s) IntraMuscular once  heparin  Infusion.  Unit(s)/Hr (24 mL/Hr) IV Continuous <Continuous>  insulin glargine Injectable (LANTUS) 15 Unit(s) SubCutaneous at bedtime  insulin glargine Injectable (LANTUS) 15 Unit(s) SubCutaneous every morning  insulin lispro (ADMELOG) corrective regimen sliding scale   SubCutaneous three times a day before meals  insulin lispro (ADMELOG) corrective regimen sliding scale   SubCutaneous at bedtime  ketotifen 0.025% Ophthalmic Solution 1 Drop(s) Both EYES two times a day  loratadine 10 milliGRAM(s) Oral daily  nystatin Powder 1 Application(s) Topical every 12 hours  oxyCODONE  ER Tablet 60 milliGRAM(s) Oral every 12 hours  polyethylene glycol 3350 17 Gram(s) Oral daily  predniSONE   Tablet   Oral     MEDICATIONS  (PRN):  albuterol    0.083% 2.5 milliGRAM(s) Nebulizer every 4 hours PRN Shortness of Breath and/or Wheezing  ALPRAZolam 0.25 milliGRAM(s) Oral every 12 hours PRN Anxiety  dextrose Oral Gel 15 Gram(s) Oral once PRN Blood Glucose LESS THAN 70 milliGRAM(s)/deciliter  heparin   Injectable 59053 Unit(s) IV Push every 6 hours PRN For aPTT less than 40  heparin   Injectable 5000 Unit(s) IV Push every 6 hours PRN For aPTT between 40 - 57  HYDROmorphone  Injectable 1 milliGRAM(s) IV Push every 3 hours PRN Severe Pain (7 - 10)  lactulose Syrup 20 Gram(s) Oral every 8 hours PRN Constipation        I&O's Summary    03 Sep 2023 07:01  -  04 Sep 2023 07:00  --------------------------------------------------------  IN: 256 mL / OUT: 5450 mL / NET: -5194 mL        PHYSICAL EXAM:  Vital Signs Last 24 Hrs  T(C): 36.4 (04 Sep 2023 05:18), Max: 37.3 (03 Sep 2023 10:44)  T(F): 97.5 (04 Sep 2023 05:18), Max: 99.1 (03 Sep 2023 10:44)  HR: 77 (04 Sep 2023 08:50) (70 - 82)  BP: 108/48 (04 Sep 2023 05:18) (108/48 - 153/53)  BP(mean): --  RR: 19 (04 Sep 2023 05:18) (18 - 20)  SpO2: 95% (04 Sep 2023 08:50) (91% - 95%)    Parameters below as of 04 Sep 2023 08:50  Patient On (Oxygen Delivery Method): nasal cannula, 5L      General: Morbidly obese female in bed not in distress  eyes : PERRL. intact EOM.   HENT: AT, NC. no throat erythema or exudate.  Chest: Symmetric chest rise, difficult to properly auscultate breath sounds due to body habitus   Heart: S1,S2. RRR. no heart murmur. + edema of lower ext. b/L.   Abdomen: soft. non-tender. morbidly obese, chronic skin changes around entire pannus, + BS.   Ext: no calf tenderness. moves all ext. independently  vascular : distal pulses palpable  Neuro: AAO x3. no focal weakness. no speech disorder, cns intact  Skin: rt. abdominal fold area and under rt. breast area with skin maceration, L. abdominal fold / groin area with small open wound dressing in place, stage 1-2 sacral decubitus,  rt. foot above the 5th toe a small laceration noted, mild surrounding erythema, no discharge.  psych : mood ok, no si/hi    LABS:                        10.8   13.79 )-----------( 214      ( 04 Sep 2023 05:30 )             35.6     09-04    136  |  89<L>  |  63.1<H>  ----------------------------<  302<H>  4.7   |  36.0<H>  |  2.32<H>    Ca    9.9      04 Sep 2023 05:30      PTT - ( 04 Sep 2023 05:30 )  PTT:101.8 sec      Urinalysis Basic - ( 04 Sep 2023 08:30 )    Color: Yellow / Appearance: Clear / S.005 / pH: x  Gluc: x / Ketone: Negative  / Bili: Negative / Urobili: Negative mg/dL   Blood: x / Protein: Negative / Nitrite: Negative   Leuk Esterase: Negative / RBC: Negative /HPF / WBC Negative /HPF   Sq Epi: x / Non Sq Epi: x / Bacteria: Negative        CAPILLARY BLOOD GLUCOSE      POCT Blood Glucose.: 291 mg/dL (04 Sep 2023 08:09)  POCT Blood Glucose.: 290 mg/dL (04 Sep 2023 06:40)  POCT Blood Glucose.: 365 mg/dL (03 Sep 2023 22:45)  POCT Blood Glucose.: 402 mg/dL (03 Sep 2023 17:03)  POCT Blood Glucose.: 352 mg/dL (03 Sep 2023 11:38)        RADIOLOGY & ADDITIONAL TESTS:  Results Reviewed: Y  Imaging Personally Reviewed: N  Electrocardiogram Personally Reviewed: RAYMUNDO                                          
Patient is a 68y old  Female who presents with a chief complaint of dyspnea, elevated troponin (07 Sep 2023 13:29)      HPI:  68F never smoker with hx of HFpEF, restrictive lung disease/OHS/ENRIQUE on home O2 4L, pulmonary HTN, morbid obesity, asthma, CKD3, IDDM2, uterine cancer s/p MEGAN, DVT LLE (2004), chronic leg edema 2/2 venous stasis, spinal stenosis with chronic back pain presented 8/30 with shortness of breath. Pt started on empiric anticoagulation given inability to obtain CTA/VQ due to size and renal function. LE duplex negative for DVT. Echo with preserved EF with moderately reduced RV function and PASP 70. Noted to have elevated troponin and seen by cardiology but given weight was unable to obtain NST/LHC.       Interval hx:   Reports her shortness of breath is improved. Denies any cough. Denies any chest pain. Denies any fevers/chills. Afebrile, maintaining sat on 4L. Transitioned to Eliquis.       PAST MEDICAL & SURGICAL HISTORY:  Diabetes Mellitus Type II      HTN (Hypertension)      Endometrial Hyperplasia      Cervical Stenosis of Spine      Spinal Stenosis, Lumbar      Deep Vein Thrombosis (DVT)  Left leg, 2004, treated and resolved      Dyslipidemia      Cataract      Morbid Obesity      Vitamin D deficiency      Insomnia      CKD (chronic kidney disease)  ~ III      Congestive heart failure  ~ HFpEF      Uterine cancer      Asthma      On home oxygen therapy      Gait difficulty  ~ u ses walker      Cataract extracted with lens implant 1998  Right      C Section 1994      Cholecystectomy/appendectomy @ age 25      D&C x2 1980's      D&C 2008  hysteroscopy, endometrial hyperplasia, 2009      Cervical Spinal Stenosis surgery x2 (01/2002, 7/2002)      Tonsillectomy as a child      Endometrial biopsy 12/02/09      H/O laser iridotomy  left eye, 2016      H/O colonoscopy  1998      S/P total abdominal hysterectomy and bilateral salpingo-oophorectomy      S/P appendectomy      S/P cholecystectomy      Medications:    furosemide    Tablet 40 milliGRAM(s) Oral daily    albuterol    0.083% 2.5 milliGRAM(s) Nebulizer every 4 hours PRN  albuterol/ipratropium for Nebulization 3 milliLiter(s) Nebulizer every 6 hours  budesonide 160 MICROgram(s)/formoterol 4.5 MICROgram(s) Inhaler 2 Puff(s) Inhalation two times a day  loratadine 10 milliGRAM(s) Oral daily    ALPRAZolam 0.25 milliGRAM(s) Oral every 12 hours PRN  cyclobenzaprine 5 milliGRAM(s) Oral three times a day PRN  DULoxetine 30 milliGRAM(s) Oral daily  oxyCODONE    IR 5 milliGRAM(s) Oral every 8 hours PRN  oxyCODONE  ER Tablet 60 milliGRAM(s) Oral every 12 hours      apixaban 5 milliGRAM(s) Oral every 12 hours  aspirin enteric coated 81 milliGRAM(s) Oral daily    lactulose Syrup 20 Gram(s) Oral every 8 hours PRN  polyethylene glycol 3350 17 Gram(s) Oral daily      atorvastatin 40 milliGRAM(s) Oral at bedtime  dextrose 50% Injectable 25 Gram(s) IV Push once  dextrose 50% Injectable 25 Gram(s) IV Push once  dextrose 50% Injectable 12.5 Gram(s) IV Push once  dextrose Oral Gel 15 Gram(s) Oral once PRN  glucagon  Injectable 1 milliGRAM(s) IntraMuscular once  insulin glargine Injectable (LANTUS) 20 Unit(s) SubCutaneous every morning  insulin glargine Injectable (LANTUS) 20 Unit(s) SubCutaneous at bedtime  insulin lispro (ADMELOG) corrective regimen sliding scale   SubCutaneous three times a day before meals  insulin lispro (ADMELOG) corrective regimen sliding scale   SubCutaneous at bedtime    cholecalciferol 2000 Unit(s) Oral daily  dextrose 5%. 1000 milliLiter(s) IV Continuous <Continuous>  dextrose 5%. 1000 milliLiter(s) IV Continuous <Continuous>      artificial  tears Solution 1 Drop(s) Both EYES two times a day  cadexomer iodine 0.9% Gel 1 Application(s) Topical daily  chlorhexidine 2% Cloths 1 Application(s) Topical <User Schedule>  ketotifen 0.025% Ophthalmic Solution 1 Drop(s) Both EYES two times a day  nystatin Powder 1 Application(s) Topical every 12 hours            ICU Vital Signs Last 24 Hrs  T(C): 36.8 (07 Sep 2023 21:20), Max: 36.8 (07 Sep 2023 15:44)  T(F): 98.3 (07 Sep 2023 21:20), Max: 98.3 (07 Sep 2023 15:44)  HR: 71 (08 Sep 2023 02:34) (70 - 84)  BP: 117/56 (07 Sep 2023 21:20) (117/56 - 136/82)  BP(mean): --  ABP: --  ABP(mean): --  RR: 18 (07 Sep 2023 21:20) (18 - 18)  SpO2: 93% (08 Sep 2023 02:34) (92% - 95%)    O2 Parameters below as of 08 Sep 2023 02:34  Patient On (Oxygen Delivery Method): nasal cannula w/ humidification            ABG - ( 07 Sep 2023 04:00 )  pH, Arterial: 7.470 pH, Blood: x     /  pCO2: 55    /  pO2: 89    / HCO3: 40    / Base Excess: 16.3  /  SaO2: 97.4                I&O's Detail    06 Sep 2023 07:01  -  07 Sep 2023 07:00  --------------------------------------------------------  IN:  Total IN: 0 mL    OUT:    Voided (mL): 3600 mL  Total OUT: 3600 mL    Total NET: -3600 mL            LABS:                        12.2   13.11 )-----------( 182      ( 07 Sep 2023 06:35 )             38.8     09-07    142  |  95<L>  |  71.2<H>  ----------------------------<  252<H>  4.8   |  33.0<H>  |  2.04<H>    Ca    10.3      07 Sep 2023 06:35            CAPILLARY BLOOD GLUCOSE      POCT Blood Glucose.: 260 mg/dL (07 Sep 2023 21:28)    PTT - ( 07 Sep 2023 06:35 )  PTT:26.7 sec  Urinalysis Basic - ( 07 Sep 2023 06:35 )    Color: x / Appearance: x / SG: x / pH: x  Gluc: 252 mg/dL / Ketone: x  / Bili: x / Urobili: x   Blood: x / Protein: x / Nitrite: x   Leuk Esterase: x / RBC: x / WBC x   Sq Epi: x / Non Sq Epi: x / Bacteria: x      CULTURES:      Physical Examination:    General: alert, in NAD    HEENT: NC/AT, anicteric, MMM    PULM: Clear to auscultation anteriorly, no accessory muscle use    NECK: Supple, trachea midline    CVS: S1S2, RRR    ABD: Soft, nondistended, nontender, normoactive bowel sounds    EXT: 2+ edema, nontender    SKIN: Warm and well perfused, venous stasis changes     NEURO: Alert, oriented, interactive, nonfocal    ROS: negative except per HPI      RADIOLOGY:  < from: Xray Chest 1 View- PORTABLE-Routine (Xray Chest 1 View- PORTABLE-Routine in AM.) (09.07.23 @ 05:33) >  COMPARISON: 8/30/2023 and 10/12/2022 chest x-rays .    FINDINGS:  CATHETERS AND TUBES: None    PULMONARY: No gross large airspace consolidation or pneumothorax.  No pneumothorax.    HEART/VASCULAR: Cardiomegaly..    BONES: Visualized osseous thorax intact.    IMPRESSION:   Cardiomegaly.  No large airspace consolidation.  Follow-up straight AP portable chest radiograph recommended for further   evaluation..    < end of copied text >    
                                                         [facility]                                                         CARDIOLOGY AT [facility]                                                                  [address], [place] [address]                                                         Telephone: [phone]. Fax:[phone]                                                                             PROGRESS NOTE    Reason for follow up: CHF. Pul htn, elevated trops  Update: still feels short of breath   Diuresing well  IN: 256 mL / OUT: 5450 mL / NET: -5194 mL      Review of symptoms:   Cardiac:  chronic knot in chest  Respiratory: no cough. ++ dyspnea  Gastrointestinal: No diarrhea. No abdominal pain. No bleeding.   Neuro: No focal neuro complaints.      Vitals:  T(C): 36.4 (09-04-23 @ 05:18), Max: 37.3 (09-03-23 @ 10:44)  HR: 70 (09-04-23 @ 05:18) (70 - 82)  BP: 108/48 (09-04-23 @ 05:18) (108/48 - 153/53)  RR: 19 (09-04-23 @ 05:18) (18 - 20)  SpO2: 91% (09-04-23 @ 05:18) (91% - 94%)  Wt(kg): --  I&O's Summary    03 Sep 2023 07:01  -  04 Sep 2023 07:00  --------------------------------------------------------  IN: 256 mL / OUT: 5450 mL / NET: -5194 mL      Weight (kg): 261 (08-30 @ 16:20)    PHYSICAL EXAM:  Appearance:  Morbidly obese  HEENT:  Atraumatic. Normocephalic.  Normal oral mucosa  Neurologic: A & O x 3, no gross focal deficits.  Cardiovascular: RRR S1 S2, regular heart sounds decreaed  Respiratory: Lung sound decreaed  Gastrointestinal:  Soft, Non-tender, + BS  Lower Extremities: legs with edema and chronic changes  Psychiatry: Patient is calm. No agitation.   Skin: warm and dry.      CURRENT MEDICATIONS:  MEDICATIONS  (STANDING):  albuterol/ipratropium for Nebulization 3 milliLiter(s) Nebulizer every 6 hours  artificial  tears Solution 1 Drop(s) Both EYES two times a day  aspirin enteric coated 81 milliGRAM(s) Oral daily  atorvastatin 40 milliGRAM(s) Oral at bedtime  budesonide 160 MICROgram(s)/formoterol 4.5 MICROgram(s) Inhaler 2 Puff(s) Inhalation two times a day  buMETAnide Injectable 2 milliGRAM(s) IV Push every 12 hours  cadexomer iodine 0.9% Gel 1 Application(s) Topical daily  chlorhexidine 2% Cloths 1 Application(s) Topical <User Schedule>  cholecalciferol 2000 Unit(s) Oral daily  doxycycline monohydrate Capsule 100 milliGRAM(s) Oral every 12 hours  glucagon  Injectable 1 milliGRAM(s) IntraMuscular once  heparin  Infusion.  Unit(s)/Hr (24 mL/Hr) IV Continuous <Continuous>  insulin glargine Injectable (LANTUS) 15 Unit(s) SubCutaneous every morning  insulin glargine Injectable (LANTUS) 15 Unit(s) SubCutaneous at bedtime  insulin lispro (ADMELOG) corrective regimen sliding scale   SubCutaneous at bedtime  insulin lispro (ADMELOG) corrective regimen sliding scale   SubCutaneous three times a day before meals  ketotifen 0.025% Ophthalmic Solution 1 Drop(s) Both EYES two times a day  loratadine 10 milliGRAM(s) Oral daily  nystatin Powder 1 Application(s) Topical every 12 hours  oxyCODONE  ER Tablet 60 milliGRAM(s) Oral every 12 hours  polyethylene glycol 3350 17 Gram(s) Oral daily  predniSONE   Tablet   Oral       LABS:	 	                            10.8   13.79 )-----------( 214      ( 04 Sep 2023 05:30 )             35.6     09-04    136  |  89<L>  |  63.1<H>  ----------------------------<  302<H>  4.7   |  36.0<H>  |  2.32<H>    Ca    9.9      04 Sep 2023 05:30    TELEMETRY: NSR    DIAGNOSTIC TESTING:  [ ] Echocardiogram: < from: TTE Echo Complete w/ Contrast w/ Doppler (08.31.23 @ 08:19) >   1. Technically limited study.   2. Endocardial visualization was enhanced with intravenous echo contrast.   3. Left ventricular ejection fraction, by visual estimation, is 55 to   60%.   4. Grossly Normal global left ventricular systolic function.   5. Spectral Doppler shows pseudonormal pattern of left ventricular   myocardial filling (Grade II diastolic dysfunction).   6. Moderately enlarged right ventricle.   7. Moderately reduced RV systolic function on limited views.   8. The left atrium is normal in size.   9. Mild mitral annular calcification.  10. Mild thickening and calcification of the anterior and posterior   mitral valve leaflets.  11. Mild mitral valve regurgitation.  12. Moderate tricuspid regurgitation.  13. Estimated pulmonary artery systolic pressure is 70.9 mmHg assuming a   right atrial pressure of 5 mmHg, which is consistent with severe   pulmonary hypertension.  14. There is no evidence of pericardial effusion.      
[facility] Division of Hospital Medicine    Chief Complaint:      SUBJECTIVE / OVERNIGHT EVENTS:    Patient seen and examined at bedside, no acute events overnight, patient denies any new complaints. Saturating well on NC 2L (uses 4 at home), remains on Heparin drip per cardiology. Notably D dimer is not truly elevated, age corrected would be > 690, US LE's negative, ECHO shows severe PH, low suspicion for clot however patient is certainly sedentary / stasis high risk for clot. Continues on Diuresis with Lasix 40 mg IV BID.     MEDICATIONS  (STANDING):  albuterol/ipratropium for Nebulization 3 milliLiter(s) Nebulizer every 6 hours  aspirin enteric coated 81 milliGRAM(s) Oral daily  atorvastatin 40 milliGRAM(s) Oral at bedtime  chlorhexidine 2% Cloths 1 Application(s) Topical <User Schedule>  cholecalciferol 2000 Unit(s) Oral daily  dextrose 5%. 1000 milliLiter(s) (50 mL/Hr) IV Continuous <Continuous>  dextrose 5%. 1000 milliLiter(s) (100 mL/Hr) IV Continuous <Continuous>  dextrose 50% Injectable 25 Gram(s) IV Push once  dextrose 50% Injectable 12.5 Gram(s) IV Push once  dextrose 50% Injectable 25 Gram(s) IV Push once  doxycycline monohydrate Capsule 100 milliGRAM(s) Oral every 12 hours  furosemide   Injectable 40 milliGRAM(s) IV Push every 12 hours  glucagon  Injectable 1 milliGRAM(s) IntraMuscular once  heparin  Infusion.  Unit(s)/Hr (24 mL/Hr) IV Continuous <Continuous>  insulin glargine Injectable (LANTUS) 15 Unit(s) SubCutaneous at bedtime  insulin glargine Injectable (LANTUS) 15 Unit(s) SubCutaneous every morning  insulin lispro (ADMELOG) corrective regimen sliding scale   SubCutaneous three times a day before meals  insulin lispro (ADMELOG) corrective regimen sliding scale   SubCutaneous at bedtime  loratadine 10 milliGRAM(s) Oral daily  nystatin Powder 1 Application(s) Topical every 12 hours  oxyCODONE  ER Tablet 60 milliGRAM(s) Oral every 12 hours  polyethylene glycol 3350 17 Gram(s) Oral daily    MEDICATIONS  (PRN):  albuterol    0.083% 2.5 milliGRAM(s) Nebulizer every 4 hours PRN Shortness of Breath and/or Wheezing  dextrose Oral Gel 15 Gram(s) Oral once PRN Blood Glucose LESS THAN 70 milliGRAM(s)/deciliter  heparin   Injectable 13688 Unit(s) IV Push every 6 hours PRN For aPTT less than 40  heparin   Injectable 5000 Unit(s) IV Push every 6 hours PRN For aPTT between 40 - 57        I&O's Summary    31 Aug 2023 07:01  -  31 Aug 2023 15:43  --------------------------------------------------------  IN: 0 mL / OUT: 1500 mL / NET: -1500 mL        PHYSICAL EXAM:  Vital Signs Last 24 Hrs  T(C): 36.5 (31 Aug 2023 12:08), Max: 36.8 (30 Aug 2023 19:18)  T(F): 97.7 (31 Aug 2023 12:08), Max: 98.2 (30 Aug 2023 19:18)  HR: 71 (31 Aug 2023 12:08) (71 - 94)  BP: 121/56 (31 Aug 2023 12:08) (111/61 - 158/72)  BP(mean): 78 (31 Aug 2023 12:08) (78 - 78)  RR: 19 (31 Aug 2023 12:08) (17 - 77)  SpO2: 96% (31 Aug 2023 12:08) (94% - 99%)    Parameters below as of 31 Aug 2023 15:01  Patient On (Oxygen Delivery Method): nasal cannula, 2L      General: Morbidly obese female in bed not in distress  eyes : PERRL. intact EOM.   HENT: AT, NC. no throat erythema or exudate.  Chest: Symmetric chest rise, difficult to properly auscultate breath sounds due to body habitus   Heart: S1,S2. RRR. no heart murmur. + edema of lower ext. b/L.   Abdomen: soft. non-tender. morbidly obese, chronic skin changes around entire pannus, + BS.   Ext: no calf tenderness. moves all ext. independently  vascular : distal pulses palpable  Neuro: AAO x3. no focal weakness. no speech disorder, cns intact  Skin: rt. abdominal fold area and under rt. breast area with skin maceration, L. abdominal fold / groin area with small open wound dressing in place, stage 1-2 sacral decubitus,  rt. foot above the 5th toe a small laceration noted, mild surrounding erythema, no discharge.  psych : mood ok, no si/hi    LABS:                        10.9   9.10  )-----------( 232      ( 31 Aug 2023 10:25 )             36.6     08-31    144  |  101  |  39.1<H>  ----------------------------<  79  4.9   |  32.0<H>  |  2.06<H>    Ca    9.4      31 Aug 2023 10:25    TPro  7.0  /  Alb  3.7  /  TBili  0.6  /  DBili  x   /  AST  26  /  ALT  18  /  AlkPhos  222<H>  08-30    PT/INR - ( 30 Aug 2023 17:13 )   PT: 11.8 sec;   INR: 1.07 ratio         PTT - ( 31 Aug 2023 10:25 )  PTT:176.3 sec  CARDIAC MARKERS ( 31 Aug 2023 10:25 )  x     / 0.06 ng/mL / x     / x     / x      CARDIAC MARKERS ( 31 Aug 2023 00:43 )  x     / 0.08 ng/mL / x     / x     / x      CARDIAC MARKERS ( 30 Aug 2023 17:13 )  x     / 0.09 ng/mL / x     / x     / x          Urinalysis Basic - ( 31 Aug 2023 10:25 )    Color: x / Appearance: x / SG: x / pH: x  Gluc: 79 mg/dL / Ketone: x  / Bili: x / Urobili: x   Blood: x / Protein: x / Nitrite: x   Leuk Esterase: x / RBC: x / WBC x   Sq Epi: x / Non Sq Epi: x / Bacteria: x        CAPILLARY BLOOD GLUCOSE      POCT Blood Glucose.: 75 mg/dL (31 Aug 2023 12:40)  POCT Blood Glucose.: 79 mg/dL (31 Aug 2023 10:36)  POCT Blood Glucose.: 85 mg/dL (30 Aug 2023 23:34)  POCT Blood Glucose.: 51 mg/dL (30 Aug 2023 23:11)        RADIOLOGY & ADDITIONAL TESTS:  Results Reviewed:   Imaging Personally Reviewed:  Electrocardiogram Personally Reviewed:  
Hospitalist Progress Note    Chief Complaint:  Dyspnea, Elevated, troponin    SUBJECTIVE / OVERNIGHT EVENTS:  No events overnight, patient seen at bedside, in NAD, No major complaints at this time. Patient denies chest pain, abd pain, N/V, fever, chills, dysuria or any other complaints. All remainder ROS negative.       MEDICATIONS  (STANDING):  albuterol/ipratropium for Nebulization 3 milliLiter(s) Nebulizer every 6 hours  artificial  tears Solution 1 Drop(s) Both EYES two times a day  aspirin enteric coated 81 milliGRAM(s) Oral daily  atorvastatin 40 milliGRAM(s) Oral at bedtime  budesonide 160 MICROgram(s)/formoterol 4.5 MICROgram(s) Inhaler 2 Puff(s) Inhalation two times a day  cadexomer iodine 0.9% Gel 1 Application(s) Topical daily  chlorhexidine 2% Cloths 1 Application(s) Topical <User Schedule>  cholecalciferol 2000 Unit(s) Oral daily  dextrose 5%. 1000 milliLiter(s) (50 mL/Hr) IV Continuous <Continuous>  dextrose 5%. 1000 milliLiter(s) (100 mL/Hr) IV Continuous <Continuous>  dextrose 50% Injectable 25 Gram(s) IV Push once  dextrose 50% Injectable 12.5 Gram(s) IV Push once  dextrose 50% Injectable 25 Gram(s) IV Push once  doxycycline monohydrate Capsule 100 milliGRAM(s) Oral every 12 hours  furosemide    Tablet 40 milliGRAM(s) Oral daily  glucagon  Injectable 1 milliGRAM(s) IntraMuscular once  heparin  Infusion.  Unit(s)/Hr (24 mL/Hr) IV Continuous <Continuous>  insulin glargine Injectable (LANTUS) 20 Unit(s) SubCutaneous at bedtime  insulin glargine Injectable (LANTUS) 20 Unit(s) SubCutaneous every morning  insulin lispro (ADMELOG) corrective regimen sliding scale   SubCutaneous three times a day before meals  insulin lispro (ADMELOG) corrective regimen sliding scale   SubCutaneous at bedtime  ketotifen 0.025% Ophthalmic Solution 1 Drop(s) Both EYES two times a day  loratadine 10 milliGRAM(s) Oral daily  nystatin Powder 1 Application(s) Topical every 12 hours  oxyCODONE  ER Tablet 60 milliGRAM(s) Oral every 12 hours  polyethylene glycol 3350 17 Gram(s) Oral daily  predniSONE   Tablet 10 milliGRAM(s) Oral daily  predniSONE   Tablet   Oral     MEDICATIONS  (PRN):  albuterol    0.083% 2.5 milliGRAM(s) Nebulizer every 4 hours PRN Shortness of Breath and/or Wheezing  ALPRAZolam 0.25 milliGRAM(s) Oral every 12 hours PRN Anxiety  dextrose Oral Gel 15 Gram(s) Oral once PRN Blood Glucose LESS THAN 70 milliGRAM(s)/deciliter  heparin   Injectable 66990 Unit(s) IV Push every 6 hours PRN For aPTT less than 40  heparin   Injectable 5000 Unit(s) IV Push every 6 hours PRN For aPTT between 40 - 57  HYDROmorphone  Injectable 1 milliGRAM(s) IV Push every 3 hours PRN Severe Pain (7 - 10)  lactulose Syrup 20 Gram(s) Oral every 8 hours PRN Constipation        I&O's Summary    04 Sep 2023 07:01  -  05 Sep 2023 07:00  --------------------------------------------------------  IN: 156 mL / OUT: 2400 mL / NET: -2244 mL    05 Sep 2023 07:01  -  05 Sep 2023 12:30  --------------------------------------------------------  IN: 0 mL / OUT: 2400 mL / NET: -2400 mL        PHYSICAL EXAM:  Vital Signs Last 24 Hrs  T(C): 36.9 (05 Sep 2023 10:14), Max: 36.9 (05 Sep 2023 10:14)  T(F): 98.4 (05 Sep 2023 10:14), Max: 98.4 (05 Sep 2023 10:14)  HR: 82 (05 Sep 2023 10:14) (74 - 82)  BP: 119/63 (05 Sep 2023 10:14) (106/54 - 140/63)  BP(mean): --  RR: 19 (05 Sep 2023 10:14) (18 - 19)  SpO2: 95% (05 Sep 2023 10:14) (90% - 98%)    Parameters below as of 05 Sep 2023 10:14  Patient On (Oxygen Delivery Method): nasal cannula        General: Morbidly obese female in bed not in distress  eyes : PERRL. intact EOM.   HENT: AT, NC. no throat erythema or exudate.  Chest: Symmetric chest rise, difficult to properly auscultate breath sounds due to body habitus   Heart: S1,S2. RRR. no heart murmur. + edema of lower ext. b/L.   Abdomen: soft. non-tender. morbidly obese, chronic skin changes around entire pannus, + BS.   Ext: no calf tenderness. moves all ext. independently  vascular : distal pulses palpable  Neuro: AAO x3. no focal weakness. no speech disorder, cns intact  Skin: rt. abdominal fold area and under rt. breast area with skin maceration, L. abdominal fold / groin area with small open wound dressing in place, stage 1-2 sacral decubitus,  rt. foot above the 5th toe a small laceration noted, mild surrounding erythema, no discharge.  psych : mood ok, no si/hi    LABS:                        11.2   13.29 )-----------( 188      ( 05 Sep 2023 05:33 )             36.7     09-05    136  |  90<L>  |  68.8<H>  ----------------------------<  323<H>  4.6   |  35.0<H>  |  2.32<H>    Ca    9.9      05 Sep 2023 05:33      PTT - ( 05 Sep 2023 07:32 )  PTT:51.9 sec      Urinalysis Basic - ( 05 Sep 2023 05:33 )    Color: x / Appearance: x / SG: x / pH: x  Gluc: 323 mg/dL / Ketone: x  / Bili: x / Urobili: x   Blood: x / Protein: x / Nitrite: x   Leuk Esterase: x / RBC: x / WBC x   Sq Epi: x / Non Sq Epi: x / Bacteria: x        CAPILLARY BLOOD GLUCOSE      POCT Blood Glucose.: 417 mg/dL (05 Sep 2023 11:59)  POCT Blood Glucose.: 311 mg/dL (05 Sep 2023 07:57)  POCT Blood Glucose.: 354 mg/dL (04 Sep 2023 21:44)  POCT Blood Glucose.: 349 mg/dL (04 Sep 2023 17:08)        RADIOLOGY & ADDITIONAL TESTS:  Results Reviewed: Y  Imaging Personally Reviewed: N  Electrocardiogram Personally Reviewed: RAYMUNDO                                          
PULMONARY PROGRESS NOTE      [id number]    Patient is a 68y old  Female who presents with a chief complaint of dyspnea, elevated troponin (01 Sep 2023 08:38)  68y old  Female who presents with a chief complaint of dyspnea, elevated troponin (30 Aug 2023 21:35)  This is 69 y/o female with hx of HFpEF, on home O2 4L, pulmonary HTN, morbid obesity, asthma, CKD3, IDDM2, uterine cancer s/p MEGAN, DVT LLE (2004), chronic leg edema 2/2 venous stasis, spinal stenosis with chronic back pain who presents with SOB. Pt states that she has chronic difficulty breathing but it got even worse than normal over the past week. Pt ambulates with a walker. She fell and sustained right foot laceration a few days ago. Today she wasn't able to get out of bed due to dyspnea and pain and family called EMS. In the ED pt found with elevated D-dimer (407) and troponin  (0.09) and was started on Heparin drip. Cr 1.89, proBNP 1623. Pt follows with a cardiologist from [place]. Pt states that she's been having daily, intermittent chest pain for years without any recent change. She had an abnormal stress test in the past but no cardiac catheterization was performed. Echocardiogram in 10/2022 showed EF 50-55% and no significant valvular abnormalities.  never smoker  intolerant of cpap in past  last PFts 2019- mod restriction, mod impaired DLCO  Treated as "asthma"  currently no cp or sputum  fell at home, unstable transfers  lives in room most of time, has toilet in room  02 4 L continuously      INTERVAL HPI/OVERNIGHT EVENTS:    MEDICATIONS  (STANDING):  albuterol/ipratropium for Nebulization 3 milliLiter(s) Nebulizer every 6 hours  aspirin enteric coated 81 milliGRAM(s) Oral daily  atorvastatin 40 milliGRAM(s) Oral at bedtime  budesonide 160 MICROgram(s)/formoterol 4.5 MICROgram(s) Inhaler 2 Puff(s) Inhalation two times a day  chlorhexidine 2% Cloths 1 Application(s) Topical <User Schedule>  cholecalciferol 2000 Unit(s) Oral daily  dextrose 5%. 1000 milliLiter(s) (50 mL/Hr) IV Continuous <Continuous>  dextrose 5%. 1000 milliLiter(s) (100 mL/Hr) IV Continuous <Continuous>  dextrose 50% Injectable 12.5 Gram(s) IV Push once  dextrose 50% Injectable 25 Gram(s) IV Push once  dextrose 50% Injectable 25 Gram(s) IV Push once  doxycycline monohydrate Capsule 100 milliGRAM(s) Oral every 12 hours  furosemide   Injectable 40 milliGRAM(s) IV Push every 12 hours  glucagon  Injectable 1 milliGRAM(s) IntraMuscular once  heparin  Infusion.  Unit(s)/Hr (24 mL/Hr) IV Continuous <Continuous>  insulin glargine Injectable (LANTUS) 15 Unit(s) SubCutaneous every morning  insulin glargine Injectable (LANTUS) 15 Unit(s) SubCutaneous at bedtime  insulin lispro (ADMELOG) corrective regimen sliding scale   SubCutaneous at bedtime  insulin lispro (ADMELOG) corrective regimen sliding scale   SubCutaneous three times a day before meals  loratadine 10 milliGRAM(s) Oral daily  nystatin Powder 1 Application(s) Topical every 12 hours  oxyCODONE  ER Tablet 60 milliGRAM(s) Oral every 12 hours  polyethylene glycol 3350 17 Gram(s) Oral daily  predniSONE   Tablet   Oral   predniSONE   Tablet 30 milliGRAM(s) Oral daily      MEDICATIONS  (PRN):  albuterol    0.083% 2.5 milliGRAM(s) Nebulizer every 4 hours PRN Shortness of Breath and/or Wheezing  dextrose Oral Gel 15 Gram(s) Oral once PRN Blood Glucose LESS THAN 70 milliGRAM(s)/deciliter  heparin   Injectable 17166 Unit(s) IV Push every 6 hours PRN For aPTT less than 40  heparin   Injectable 5000 Unit(s) IV Push every 6 hours PRN For aPTT between 40 - 57      Allergies    adhesives (Rash)  latex (Rash)  wool- rash, itch (Other)  Bactrim ([place])    Intolerances        PAST MEDICAL & SURGICAL HISTORY:  Diabetes Mellitus Type II      HTN (Hypertension)      Endometrial Hyperplasia      Cervical Stenosis of Spine      Spinal Stenosis, Lumbar      Deep Vein Thrombosis (DVT)  Left leg, 2004, treated and resolved      Dyslipidemia      Cataract      Morbid Obesity      Vitamin D deficiency      Insomnia      CKD (chronic kidney disease)  ~ III      Congestive heart failure  ~ HFpEF      Uterine cancer      Asthma      On home oxygen therapy      Gait difficulty  ~ u ses walker      Cataract extracted with lens implant 1998  Right      C Section 1994      Cholecystectomy/appendectomy @ age 25      D&C x2 1980's      D&C 2008  hysteroscopy, endometrial hyperplasia, 2009      Cervical Spinal Stenosis surgery x2 (01/2002, 7/2002)      Tonsillectomy as a child      Endometrial biopsy 12/02/09      H/O laser iridotomy  left eye, 2016      H/O colonoscopy  1998      S/P total abdominal hysterectomy and bilateral salpingo-oophorectomy      S/P appendectomy      S/P cholecystectomy          SOCIAL HISTORY  Smoking History:       REVIEW OF SYSTEMS:    CONSTITUTIONAL:  No distress    CARDIOVASCULAR:  No pressure, squeezing, tightness, heaviness or aching about the chest; no palpitations.    RESPIRATORY:  No cough, shortness of breath, PND or orthopnea. Mod SOBOE      Vital Signs Last 24 Hrs  T(C): 36.6 (01 Sep 2023 04:18), Max: 36.6 (31 Aug 2023 21:47)  T(F): 97.9 (01 Sep 2023 04:18), Max: 97.9 (31 Aug 2023 21:47)  HR: 75 (01 Sep 2023 04:18) (71 - 91)  BP: 129/65 (01 Sep 2023 04:18) (118/65 - 132/69)  BP(mean): 86 (01 Sep 2023 04:18) (78 - 86)  RR: 18 (01 Sep 2023 04:18) (16 - 19)  SpO2: 94% (01 Sep 2023 04:18) (94% - 97%)    Parameters below as of 01 Sep 2023 04:18  Patient On (Oxygen Delivery Method): nasal cannula  O2 Flow (L/min): 5      PHYSICAL EXAMINATION:    GENERAL: The patient is awake and alert in no apparent distress.     HEENT: Head is normocephalic and atraumatic.     NECK: Supple.    LUNGS: Clear to auscultation without wheezing, rales or rhonchi; respirations unlabored    HEART: Regular rate and rhythm without murmur.      LABS:                        10.5   8.76  )-----------( 210      ( 01 Sep 2023 02:28 )             35.9     09-01    140  |  98  |  39.1<H>  ----------------------------<  134<H>  5.2   |  32.0<H>  |  2.12<H>    Ca    9.0      01 Sep 2023 02:28    TPro  7.0  /  Alb  3.7  /  TBili  0.6  /  DBili  x   /  AST  26  /  ALT  18  /  AlkPhos  222<H>  08-30    PT/INR - ( 30 Aug 2023 17:13 )   PT: 11.8 sec;   INR: 1.07 ratio         PTT - ( 01 Sep 2023 02:28 )  PTT:64.5 sec  Urinalysis Basic - ( 01 Sep 2023 02:28 )    Color: x / Appearance: x / SG: x / pH: x  Gluc: 134 mg/dL / Ketone: x  / Bili: x / Urobili: x   Blood: x / Protein: x / Nitrite: x   Leuk Esterase: x / RBC: x / WBC x   Sq Epi: x / Non Sq Epi: x / Bacteria: x        CARDIAC MARKERS ( 31 Aug 2023 10:25 )  x     / 0.06 ng/mL / x     / x     / x      CARDIAC MARKERS ( 31 Aug 2023 00:43 )  x     / 0.08 ng/mL / x     / x     / x      CARDIAC MARKERS ( 30 Aug 2023 17:13 )  x     / 0.09 ng/mL / x     / x     / x            
Hospitalist Progress Note    Chief Complaint:  Dyspnea, Elevated, troponin    SUBJECTIVE / OVERNIGHT EVENTS:  No events overnight, patient seen at bedside, in NAD, complaining of chronic pain and SOB. Patient denies chest pain, abd pain, N/V, fever, chills, dysuria or any other complaints. All remainder ROS negative.     MEDICATIONS  (STANDING):  albuterol/ipratropium for Nebulization 3 milliLiter(s) Nebulizer every 6 hours  artificial  tears Solution 1 Drop(s) Both EYES two times a day  aspirin enteric coated 81 milliGRAM(s) Oral daily  atorvastatin 40 milliGRAM(s) Oral at bedtime  budesonide 160 MICROgram(s)/formoterol 4.5 MICROgram(s) Inhaler 2 Puff(s) Inhalation two times a day  buMETAnide Injectable 2 milliGRAM(s) IV Push every 12 hours  cadexomer iodine 0.9% Gel 1 Application(s) Topical daily  chlorhexidine 2% Cloths 1 Application(s) Topical <User Schedule>  cholecalciferol 2000 Unit(s) Oral daily  dextrose 5%. 1000 milliLiter(s) (50 mL/Hr) IV Continuous <Continuous>  dextrose 5%. 1000 milliLiter(s) (100 mL/Hr) IV Continuous <Continuous>  dextrose 50% Injectable 25 Gram(s) IV Push once  dextrose 50% Injectable 25 Gram(s) IV Push once  dextrose 50% Injectable 12.5 Gram(s) IV Push once  doxycycline monohydrate Capsule 100 milliGRAM(s) Oral every 12 hours  glucagon  Injectable 1 milliGRAM(s) IntraMuscular once  heparin  Infusion.  Unit(s)/Hr (24 mL/Hr) IV Continuous <Continuous>  insulin glargine Injectable (LANTUS) 15 Unit(s) SubCutaneous every morning  insulin glargine Injectable (LANTUS) 15 Unit(s) SubCutaneous at bedtime  insulin lispro (ADMELOG) corrective regimen sliding scale   SubCutaneous at bedtime  insulin lispro (ADMELOG) corrective regimen sliding scale   SubCutaneous three times a day before meals  loratadine 10 milliGRAM(s) Oral daily  nystatin Powder 1 Application(s) Topical every 12 hours  oxyCODONE  ER Tablet 60 milliGRAM(s) Oral every 12 hours  polyethylene glycol 3350 17 Gram(s) Oral daily  predniSONE   Tablet   Oral     MEDICATIONS  (PRN):  albuterol    0.083% 2.5 milliGRAM(s) Nebulizer every 4 hours PRN Shortness of Breath and/or Wheezing  ALPRAZolam 0.25 milliGRAM(s) Oral every 12 hours PRN Anxiety  dextrose Oral Gel 15 Gram(s) Oral once PRN Blood Glucose LESS THAN 70 milliGRAM(s)/deciliter  heparin   Injectable 08566 Unit(s) IV Push every 6 hours PRN For aPTT less than 40  heparin   Injectable 5000 Unit(s) IV Push every 6 hours PRN For aPTT between 40 - 57  HYDROmorphone  Injectable 0.5 milliGRAM(s) IV Push every 3 hours PRN Severe Pain (7 - 10)  lactulose Syrup 20 Gram(s) Oral every 8 hours PRN Constipation        I&O's Summary    01 Sep 2023 07:01  -  02 Sep 2023 07:00  --------------------------------------------------------  IN: 0 mL / OUT: 600 mL / NET: -600 mL    02 Sep 2023 07:01  -  02 Sep 2023 10:53  --------------------------------------------------------  IN: 64 mL / OUT: 700 mL / NET: -636 mL        PHYSICAL EXAM:  Vital Signs Last 24 Hrs  T(C): 36.8 (02 Sep 2023 04:42), Max: 36.9 (01 Sep 2023 11:16)  T(F): 98.2 (02 Sep 2023 04:42), Max: 98.5 (01 Sep 2023 11:16)  HR: 80 (02 Sep 2023 08:45) (75 - 98)  BP: 132/66 (02 Sep 2023 04:42) (118/66 - 151/69)  BP(mean): --  RR: 20 (02 Sep 2023 04:42) (18 - 20)  SpO2: 95% (02 Sep 2023 08:45) (91% - 98%)    Parameters below as of 02 Sep 2023 08:45  Patient On (Oxygen Delivery Method): nasal cannula      General: Morbidly obese female in bed not in distress  eyes : PERRL. intact EOM.   HENT: AT, NC. no throat erythema or exudate.  Chest: Symmetric chest rise, difficult to properly auscultate breath sounds due to body habitus   Heart: S1,S2. RRR. no heart murmur. + edema of lower ext. b/L.   Abdomen: soft. non-tender. morbidly obese, chronic skin changes around entire pannus, + BS.   Ext: no calf tenderness. moves all ext. independently  vascular : distal pulses palpable  Neuro: AAO x3. no focal weakness. no speech disorder, cns intact  Skin: rt. abdominal fold area and under rt. breast area with skin maceration, L. abdominal fold / groin area with small open wound dressing in place, stage 1-2 sacral decubitus,  rt. foot above the 5th toe a small laceration noted, mild surrounding erythema, no discharge.  psych : mood ok, no si/hi      LABS:                        10.3   10.82 )-----------( 199      ( 02 Sep 2023 05:19 )             33.8     09-01    140  |  98  |  39.1<H>  ----------------------------<  134<H>  5.2   |  32.0<H>  |  2.12<H>    Ca    9.0      01 Sep 2023 02:28      PTT - ( 02 Sep 2023 05:19 )  PTT:51.3 sec      Urinalysis Basic - ( 01 Sep 2023 02:28 )    Color: x / Appearance: x / SG: x / pH: x  Gluc: 134 mg/dL / Ketone: x  / Bili: x / Urobili: x   Blood: x / Protein: x / Nitrite: x   Leuk Esterase: x / RBC: x / WBC x   Sq Epi: x / Non Sq Epi: x / Bacteria: x        CAPILLARY BLOOD GLUCOSE      POCT Blood Glucose.: 242 mg/dL (02 Sep 2023 08:14)  POCT Blood Glucose.: 252 mg/dL (01 Sep 2023 21:34)  POCT Blood Glucose.: 258 mg/dL (01 Sep 2023 16:08)  POCT Blood Glucose.: 239 mg/dL (01 Sep 2023 11:27)        RADIOLOGY & ADDITIONAL TESTS:  Results Reviewed: Y  Imaging Personally Reviewed: N  Electrocardiogram Personally Reviewed: RAYMUNDO                                          
                                                         [facility]                                                         CARDIOLOGY AT [facility]                                                                  [address] [address]                                                         Telephone: [phone]. Fax:[phone]                                                                             PROGRESS NOTE    Reason for admission: dyspnea, elevated troponin  Initial reason for Consult: HFpEF, Severe Pulmonary HTN  Reason for follow up: Severe pulm HTN, elevated troponin, needs ischemic eval r/o CAD  Overnight Events: stable   Last 24h Telemetry: SR 60-70    Review of symptoms:   Cardiac:  No chest pain. No dyspnea. No palpitations.  Respiratory: no cough. No dyspnea  Gastrointestinal: No diarrhea. No abdominal pain. No bleeding.   Neuro: No focal neuro complaints.    Vitals:  T(C): 37.3 (09-03-23 @ 10:44), Max: 37.3 (09-03-23 @ 10:44)  HR: 78 (09-03-23 @ 10:44) (73 - 86)  BP: 126/53 (09-03-23 @ 10:44) (123/55 - 136/65)  RR: 19 (09-03-23 @ 10:44) (18 - 20)  SpO2: 94% (09-03-23 @ 10:44) (92% - 95%)  Wt(kg): --  I&O's Summary    02 Sep 2023 07:01  -  03 Sep 2023 07:00  --------------------------------------------------------  IN: 1412 mL / OUT: 5900 mL / NET: -4488 mL    03 Sep 2023 07:01  -  03 Sep 2023 13:06  --------------------------------------------------------  IN: 0 mL / OUT: 1000 mL / NET: -1000 mL      Weight (kg): 261 (08-30 @ 16:20)    PHYSICAL EXAM:  Appearance: Comfortable. No acute distress  HEENT:  Atraumatic. Normocephalic.  Normal oral mucosa  Neurologic: A & O x 3, no gross focal deficits.  Cardiovascular: RRR S1 S2, No murmur, no rubs/gallops. No JVD  Respiratory: Lungs clear to auscultation, unlabored   Gastrointestinal:  Soft, Non-tender, + BS  Lower Extremities: 2+ Peripheral Pulses, No clubbing, cyanosis, or edema  Psychiatry: Patient is calm. No agitation.   Skin: warm and dry.    CURRENT CARDIAC MEDICATIONS:  buMETAnide Injectable 2 milliGRAM(s) IV Push every 12 hours    CURRENT OTHER MEDICATIONS:  albuterol    0.083% 2.5 milliGRAM(s) Nebulizer every 4 hours PRN Shortness of Breath and/or Wheezing  albuterol/ipratropium for Nebulization 3 milliLiter(s) Nebulizer every 6 hours  budesonide 160 MICROgram(s)/formoterol 4.5 MICROgram(s) Inhaler 2 Puff(s) Inhalation two times a day  loratadine 10 milliGRAM(s) Oral daily  doxycycline monohydrate Capsule 100 milliGRAM(s) Oral every 12 hours  ALPRAZolam 0.25 milliGRAM(s) Oral every 12 hours PRN Anxiety  HYDROmorphone  Injectable 1 milliGRAM(s) IV Push every 3 hours PRN Severe Pain (7 - 10)  oxyCODONE  ER Tablet 60 milliGRAM(s) Oral every 12 hours  lactulose Syrup 20 Gram(s) Oral every 8 hours PRN Constipation  polyethylene glycol 3350 17 Gram(s) Oral daily  atorvastatin 40 milliGRAM(s) Oral at bedtime  dextrose 50% Injectable 25 Gram(s) IV Push once, Stop order after: 1 Doses  dextrose 50% Injectable 12.5 Gram(s) IV Push once, Stop order after: 1 Doses  dextrose 50% Injectable 25 Gram(s) IV Push once, Stop order after: 1 Doses  dextrose Oral Gel 15 Gram(s) Oral once, Stop order after: 1 Doses PRN Blood Glucose LESS THAN 70 milliGRAM(s)/deciliter  glucagon  Injectable 1 milliGRAM(s) IntraMuscular once, Stop order after: 1 Doses  insulin glargine Injectable (LANTUS) 15 Unit(s) SubCutaneous at bedtime  insulin glargine Injectable (LANTUS) 15 Unit(s) SubCutaneous every morning  insulin lispro (ADMELOG) corrective regimen sliding scale   SubCutaneous at bedtime  insulin lispro (ADMELOG) corrective regimen sliding scale   SubCutaneous three times a day before meals  predniSONE   Tablet   Oral   predniSONE   Tablet 20 milliGRAM(s) Oral daily, Stop order after: 2 Days  artificial  tears Solution 1 Drop(s) Both EYES two times a day  aspirin enteric coated 81 milliGRAM(s) Oral daily  cadexomer iodine 0.9% Gel 1 Application(s) Topical daily  chlorhexidine 2% Cloths 1 Application(s) Topical <User Schedule>  cholecalciferol 2000 Unit(s) Oral daily  dextrose 5%. 1000 milliLiter(s) (50 mL/Hr) IV Continuous <Continuous>  dextrose 5%. 1000 milliLiter(s) (100 mL/Hr) IV Continuous <Continuous>  heparin   Injectable 40634 Unit(s) IV Push every 6 hours PRN For aPTT less than 40  heparin   Injectable 5000 Unit(s) IV Push every 6 hours PRN For aPTT between 40 - 57  heparin  Infusion.  Unit(s)/Hr (24 mL/Hr) IV Continuous <Continuous>  ketotifen 0.025% Ophthalmic Solution 1 Drop(s) Both EYES two times a day  nystatin Powder 1 Application(s) Topical every 12 hours    LABS:	 	  ( 31 Aug 2023 10:25 )  Troponin T  0.06 ,  CPK  X    , CKMB  X    , BNP X        , ( 31 Aug 2023 00:43 )  Troponin T  0.08<H>,  CPK  X    , CKMB  X    , BNP X        , ( 30 Aug 2023 17:13 )  Troponin T  0.09<H>,  CPK  X    , CKMB  X    , BNP X                   10.8   13.35 )-----------( 205      ( 03 Sep 2023 05:32 )             34.7     09-03    139  |  94<L>  |  56.3<H>  ----------------------------<  300<H>  5.1   |  32.0<H>  |  2.36<H>    Ca    9.6      03 Sep 2023 05:32      PT/INR/PTT ( 03 Sep 2023 05:32 )                       :                       :       X            :       66.7                  .        .                   .              .           .       X           .                                       Lipid Profile:   HgA1c:   TSH:         
                                                         [facility]                                                         CARDIOLOGY AT [address] [place] [address]                                                         Telephone: [phone]. Fax:[phone]                                                                             PROGRESS NOTE    Reason for admission: dyspnea, elevated troponin  Initial reason for Consult: HFpEF, Severe Pulmonary HTN  Reason for follow up: Severe pulm HTN, elevated troponin   Overnight Events: stable   Last 24h Telemetry: SR 60-70    Review of symptoms:   Cardiac:  No chest pain. No dyspnea. No palpitations.  Respiratory: no cough. No dyspnea  Gastrointestinal: No diarrhea. No abdominal pain. No bleeding.   Neuro: No focal neuro complaints.    Vitals:  T(C): 36.9 (09-05-23 @ 10:14), Max: 36.9 (09-04-23 @ 11:27)  HR: 82 (09-05-23 @ 10:14) (72 - 82)  BP: 119/63 (09-05-23 @ 10:14) (106/54 - 140/63)  RR: 19 (09-05-23 @ 10:14) (18 - 19)  SpO2: 95% (09-05-23 @ 10:14) (90% - 98%)  Wt(kg): --  I&O's Summary    04 Sep 2023 07:01  -  05 Sep 2023 07:00  --------------------------------------------------------  IN: 156 mL / OUT: 2400 mL / NET: -2244 mL    05 Sep 2023 07:01  -  05 Sep 2023 11:12  --------------------------------------------------------  IN: 0 mL / OUT: 2400 mL / NET: -2400 mL    PHYSICAL EXAM:  Appearance: Comfortable. No acute distress  HEENT:  Atraumatic. Normocephalic.  Normal oral mucosa  Neurologic: A & O x 3, no gross focal deficits.  Cardiovascular: RRR S1 S2, No murmur, no rubs/gallops. No JVD  Respiratory: Lungs clear to auscultation, unlabored   Gastrointestinal:  Soft, Non-tender, + BS  Lower Extremities: 2+ Peripheral Pulses, No clubbing, cyanosis, or edema  Psychiatry: Patient is calm. No agitation.   Skin: warm and dry.    CURRENT CARDIAC MEDICATIONS:  buMETAnide Injectable 2 milliGRAM(s) IV Push daily      CURRENT OTHER MEDICATIONS:  albuterol    0.083% 2.5 milliGRAM(s) Nebulizer every 4 hours PRN Shortness of Breath and/or Wheezing  albuterol/ipratropium for Nebulization 3 milliLiter(s) Nebulizer every 6 hours  budesonide 160 MICROgram(s)/formoterol 4.5 MICROgram(s) Inhaler 2 Puff(s) Inhalation two times a day  loratadine 10 milliGRAM(s) Oral daily  doxycycline monohydrate Capsule 100 milliGRAM(s) Oral every 12 hours  ALPRAZolam 0.25 milliGRAM(s) Oral every 12 hours PRN Anxiety  HYDROmorphone  Injectable 1 milliGRAM(s) IV Push every 3 hours PRN Severe Pain (7 - 10)  oxyCODONE  ER Tablet 60 milliGRAM(s) Oral every 12 hours  lactulose Syrup 20 Gram(s) Oral every 8 hours PRN Constipation  polyethylene glycol 3350 17 Gram(s) Oral daily  atorvastatin 40 milliGRAM(s) Oral at bedtime  dextrose 50% Injectable 25 Gram(s) IV Push once, Stop order after: 1 Doses  dextrose 50% Injectable 12.5 Gram(s) IV Push once, Stop order after: 1 Doses  dextrose 50% Injectable 25 Gram(s) IV Push once, Stop order after: 1 Doses  dextrose Oral Gel 15 Gram(s) Oral once, Stop order after: 1 Doses PRN Blood Glucose LESS THAN 70 milliGRAM(s)/deciliter  glucagon  Injectable 1 milliGRAM(s) IntraMuscular once, Stop order after: 1 Doses  insulin glargine Injectable (LANTUS) 20 Unit(s) SubCutaneous every morning  insulin glargine Injectable (LANTUS) 20 Unit(s) SubCutaneous at bedtime  insulin lispro (ADMELOG) corrective regimen sliding scale   SubCutaneous three times a day before meals  insulin lispro (ADMELOG) corrective regimen sliding scale   SubCutaneous at bedtime  predniSONE   Tablet   Oral   predniSONE   Tablet 10 milliGRAM(s) Oral daily, Stop order after: 2 Days  artificial  tears Solution 1 Drop(s) Both EYES two times a day  aspirin enteric coated 81 milliGRAM(s) Oral daily  cadexomer iodine 0.9% Gel 1 Application(s) Topical daily  chlorhexidine 2% Cloths 1 Application(s) Topical <User Schedule>  cholecalciferol 2000 Unit(s) Oral daily  dextrose 5%. 1000 milliLiter(s) (50 mL/Hr) IV Continuous <Continuous>  dextrose 5%. 1000 milliLiter(s) (100 mL/Hr) IV Continuous <Continuous>  heparin   Injectable 79444 Unit(s) IV Push every 6 hours PRN For aPTT less than 40  heparin   Injectable 5000 Unit(s) IV Push every 6 hours PRN For aPTT between 40 - 57  heparin  Infusion.  Unit(s)/Hr (24 mL/Hr) IV Continuous <Continuous>  ketotifen 0.025% Ophthalmic Solution 1 Drop(s) Both EYES two times a day  nystatin Powder 1 Application(s) Topical every 12 hours      LABS:	 	  ( 31 Aug 2023 10:25 )  Troponin T  0.06 ,  CPK  X    , CKMB  X    , BNP X        , ( 31 Aug 2023 00:43 )  Troponin T  0.08<H>,  CPK  X    , CKMB  X    , BNP X        , ( 30 Aug 2023 17:13 )  Troponin T  0.09<H>,  CPK  X    , CKMB  X    , BNP X                     11.2   13.29 )-----------( 188      ( 05 Sep 2023 05:33 )             36.7     09-05    136  |  90<L>  |  68.8<H>  ----------------------------<  323<H>  4.6   |  35.0<H>  |  2.32<H>    Ca    9.9      05 Sep 2023 05:33      PT/INR/PTT ( 05 Sep 2023 07:32 )                       :                       :      X            :       51.9                  .        .                   .              .           .       X           .                                       Lipid Profile:   HgA1c:   TSH:         
                                                         [facility]                                                         CARDIOLOGY AT [facility]                                                                  [address] [address]                                                         Telephone: [phone]. Fax:[phone]                                                                             PROGRESS NOTE    Reason for admission: dyspnea, elevated troponin  Initial reason for Consult: HFpEF, Severe Pulmonary HTN  Reason for follow up: Severe pulm HTN, elevated troponin, needs ischemic eval r/o CAD  Overnight Events: stable   Last 24h Telemetry: SR 60-70    Review of symptoms:   Cardiac:  No chest pain. No dyspnea. No palpitations.  Respiratory: no cough. No dyspnea  Gastrointestinal: No diarrhea. No abdominal pain. No bleeding.   Neuro: No focal neuro complaints.    Vitals:  T(C): 36.7 (09-02-23 @ 12:00), Max: 36.9 (09-01-23 @ 16:52)  HR: 74 (09-02-23 @ 14:40) (70 - 98)  BP: 126/72 (09-02-23 @ 12:00) (120/67 - 151/69)  RR: 18 (09-02-23 @ 12:00) (18 - 20)  SpO2: 94% (09-02-23 @ 14:40) (90% - 98%)  Wt(kg): --  I&O's Summary    01 Sep 2023 07:01  -  02 Sep 2023 07:00  --------------------------------------------------------  IN: 0 mL / OUT: 600 mL / NET: -600 mL    02 Sep 2023 07:01  -  02 Sep 2023 14:50  --------------------------------------------------------  IN: 450 mL / OUT: 700 mL / NET: -250 mL      Weight (kg): 261 (08-30 @ 16:20)    PHYSICAL EXAM:  Appearance: Comfortable. No acute distress  HEENT:  Atraumatic. Normocephalic.  Normal oral mucosa  Neurologic: A & O x 3, no gross focal deficits.  Cardiovascular: RRR S1 S2, No murmur, no rubs/gallops. No JVD  Respiratory: Lungs clear to auscultation, unlabored   Gastrointestinal:  Soft, Non-tender, + BS  Lower Extremities: 2+ Peripheral Pulses, No clubbing, cyanosis, or edema  Psychiatry: Patient is calm. No agitation.   Skin: warm and dry.    CURRENT CARDIAC MEDICATIONS:  buMETAnide Injectable 2 milliGRAM(s) IV Push every 12 hours      CURRENT OTHER MEDICATIONS:  albuterol    0.083% 2.5 milliGRAM(s) Nebulizer every 4 hours PRN Shortness of Breath and/or Wheezing  albuterol/ipratropium for Nebulization 3 milliLiter(s) Nebulizer every 6 hours  budesonide 160 MICROgram(s)/formoterol 4.5 MICROgram(s) Inhaler 2 Puff(s) Inhalation two times a day  loratadine 10 milliGRAM(s) Oral daily  doxycycline monohydrate Capsule 100 milliGRAM(s) Oral every 12 hours  ALPRAZolam 0.25 milliGRAM(s) Oral every 12 hours PRN Anxiety  HYDROmorphone  Injectable 0.5 milliGRAM(s) IV Push every 3 hours PRN Severe Pain (7 - 10)  oxyCODONE  ER Tablet 60 milliGRAM(s) Oral every 12 hours  lactulose Syrup 20 Gram(s) Oral every 8 hours PRN Constipation  polyethylene glycol 3350 17 Gram(s) Oral daily  atorvastatin 40 milliGRAM(s) Oral at bedtime  dextrose 50% Injectable 25 Gram(s) IV Push once, Stop order after: 1 Doses  dextrose 50% Injectable 12.5 Gram(s) IV Push once, Stop order after: 1 Doses  dextrose 50% Injectable 25 Gram(s) IV Push once, Stop order after: 1 Doses  dextrose Oral Gel 15 Gram(s) Oral once, Stop order after: 1 Doses PRN Blood Glucose LESS THAN 70 milliGRAM(s)/deciliter  glucagon  Injectable 1 milliGRAM(s) IntraMuscular once, Stop order after: 1 Doses  insulin glargine Injectable (LANTUS) 15 Unit(s) SubCutaneous every morning  insulin glargine Injectable (LANTUS) 15 Unit(s) SubCutaneous at bedtime  insulin lispro (ADMELOG) corrective regimen sliding scale   SubCutaneous at bedtime  insulin lispro (ADMELOG) corrective regimen sliding scale   SubCutaneous three times a day before meals  predniSONE   Tablet   Oral   artificial  tears Solution 1 Drop(s) Both EYES two times a day  aspirin enteric coated 81 milliGRAM(s) Oral daily  cadexomer iodine 0.9% Gel 1 Application(s) Topical daily  chlorhexidine 2% Cloths 1 Application(s) Topical <User Schedule>  cholecalciferol 2000 Unit(s) Oral daily  dextrose 5%. 1000 milliLiter(s) (50 mL/Hr) IV Continuous <Continuous>  dextrose 5%. 1000 milliLiter(s) (100 mL/Hr) IV Continuous <Continuous>  heparin   Injectable 5000 Unit(s) IV Push every 6 hours PRN For aPTT between 40 - 57  heparin   Injectable 59355 Unit(s) IV Push every 6 hours PRN For aPTT less than 40  heparin  Infusion.  Unit(s)/Hr (24 mL/Hr) IV Continuous <Continuous>  nystatin Powder 1 Application(s) Topical every 12 hours      LABS:	 	  ( 31 Aug 2023 10:25 )  Troponin T  0.06 ,  CPK  X    , CKMB  X    , BNP X        , ( 31 Aug 2023 00:43 )  Troponin T  0.08<H>,  CPK  X    , CKMB  X    , BNP X        , ( 30 Aug 2023 17:13 )  Troponin T  0.09<H>,  CPK  X    , CKMB  X    , BNP X                      10.3   10.82 )-----------( 199      ( 02 Sep 2023 05:19 )             33.8     09-01    140  |  98  |  39.1<H>  ----------------------------<  134<H>  5.2   |  32.0<H>  |  2.12<H>    Ca    9.0      01 Sep 2023 02:28      PT/INR/PTT ( 02 Sep 2023 13:19 )                       :                       :       X            :       86.6                  .        .                   .              .           .       X           .                                       Lipid Profile:   HgA1c:   TSH:

## 2023-09-13 NOTE — PROGRESS NOTE ADULT - ASSESSMENT
69 y/o morbidly obese female with h/o asthma on home o2, HFp EF, spinal stenosis, dm, ckd, , dvt lt. leg in 2004, treated and resolved came in for increasing sob for past 1 week. pt. stated that she has baseline chronic sob but last week was more than her baseline and weakness. As per pt. she gets oob and uses walker. In the ER  first trop 0.09 , d dimer 407, due to pt's weight she exceeds the wt. limit for ct. Cardiology following, continue diuresis, Heparin drip though low suspicion for clot however continues for potential ACS, unable to stress test, unable to perform CTA / VQ scan, LHC / RHC due to weight, Pulm following, severe Pulm HTN, vague h/o Asthma, patient is at baseline oxygenation. Was diuresed.     #acute on Chronic HFpEF - acute component  resolved   ·  ECHO performed with 55% EF, moderate reduced RV function, severe RVSP elevation   - cardiology signed off  - c/w daily lasix   - euvolemic, no longer acutely exacerbated    #Elevated troponin.   unable to obtain Ischemic workup due to body habitus    #Pulmonary HTN   per ECHO RVSP severely elevated > 70, likely unable to obtain RHC given weight, possibly Type II/III   euvolemic  c/w daily lasix  c/w oxygen long term  symbicort w/ spacer BID    #Obesity hypoventilation syndrome   declining CPAP at this time    #Chronic respiratory failure with hypoxia  Multifactorial- restrictive lung disease 2/2 morbid obesity, HFpEF exacerbation / Severe pulm HTN  Pulm following, short course of nebs, wean to PRN, symbicort with spacer ordered given h/o asthma, with short course of medrol   - patient at baseline oxygen, no longer in acute respiratory failure    #JACQUES on CKD stage 3    - cr 2.03, improving, nearing baseline   avoid nephrotoxic meds, hold lisinopril for now  f/u renal fx  nephro on board    #DM (diabetes mellitus).   uncontrolled  SSI  c/w Lantus BID    #Chronic Edema   Lymphedema noted in B/L LE's, chronic changes of Abdomen with weeping while lifting pannus   Wound care following   Skin maceration in multiple fold areas, Dressing over L. groin wound under pannus     #Wound of foot.   rt. foot wound above 5th toe , recently got skin laceration, will keep on doxycycline  X ray foot shows no signs of foreign body / OM, nonspecific edema of foot   Podiatry consulted, however primarily chronic changes of both feet     #Morbid Obesity   Patient > 580 lbs, greatly impacting potential health care, unable to use CT imaging, NM stress, V/Q scan  Bariatric consult outpatient    #Chronic pain  on oxy ER 60mg Q12  PRN oxy IR 5mg Q8    #Anxiety  likely in the setting of chronic pain and chronic pulmonary disease  c/w xanax 0.25mg Q12  c/w cymbalta 30mg Q24    #Fall at home - Severely deconditioned - PT     DVT prophylaxis: eliquis Q12  Diet: DASH   Dispo: Meagan today

## 2023-09-17 ENCOUNTER — INPATIENT (INPATIENT)
Facility: HOSPITAL | Age: 68
LOS: 11 days | Discharge: HOME HEALTH | DRG: 393 | End: 2023-09-29
Attending: STUDENT IN AN ORGANIZED HEALTH CARE EDUCATION/TRAINING PROGRAM | Admitting: HOSPITALIST
Payer: MEDICARE

## 2023-09-17 VITALS
DIASTOLIC BLOOD PRESSURE: 53 MMHG | TEMPERATURE: 98 F | WEIGHT: 293 LBS | OXYGEN SATURATION: 93 % | SYSTOLIC BLOOD PRESSURE: 130 MMHG | HEART RATE: 75 BPM | RESPIRATION RATE: 20 BRPM

## 2023-09-17 DIAGNOSIS — Z90.49 ACQUIRED ABSENCE OF OTHER SPECIFIED PARTS OF DIGESTIVE TRACT: Chronic | ICD-10-CM

## 2023-09-17 DIAGNOSIS — E16.2 HYPOGLYCEMIA, UNSPECIFIED: ICD-10-CM

## 2023-09-17 DIAGNOSIS — Z90.710 ACQUIRED ABSENCE OF BOTH CERVIX AND UTERUS: Chronic | ICD-10-CM

## 2023-09-17 DIAGNOSIS — Z98.890 OTHER SPECIFIED POSTPROCEDURAL STATES: Chronic | ICD-10-CM

## 2023-09-17 LAB
ALBUMIN SERPL ELPH-MCNC: 4 G/DL — SIGNIFICANT CHANGE UP (ref 3.3–5)
ALP SERPL-CCNC: 228 U/L — HIGH (ref 40–120)
ALT FLD-CCNC: 31 U/L — SIGNIFICANT CHANGE UP (ref 10–45)
ANION GAP SERPL CALC-SCNC: 14 MMOL/L — SIGNIFICANT CHANGE UP (ref 5–17)
APPEARANCE UR: CLEAR — SIGNIFICANT CHANGE UP
APTT BLD: 33.6 SEC — SIGNIFICANT CHANGE UP (ref 24.5–35.6)
AST SERPL-CCNC: 38 U/L — SIGNIFICANT CHANGE UP (ref 10–40)
BACTERIA # UR AUTO: NEGATIVE — SIGNIFICANT CHANGE UP
BASE EXCESS BLDV CALC-SCNC: 14.3 MMOL/L — HIGH (ref -2–3)
BASOPHILS # BLD AUTO: 0.06 K/UL — SIGNIFICANT CHANGE UP (ref 0–0.2)
BASOPHILS NFR BLD AUTO: 0.5 % — SIGNIFICANT CHANGE UP (ref 0–2)
BILIRUB SERPL-MCNC: 1 MG/DL — SIGNIFICANT CHANGE UP (ref 0.2–1.2)
BILIRUB UR-MCNC: NEGATIVE — SIGNIFICANT CHANGE UP
BUN SERPL-MCNC: 42 MG/DL — HIGH (ref 7–23)
CA-I SERPL-SCNC: 1.24 MMOL/L — SIGNIFICANT CHANGE UP (ref 1.15–1.33)
CALCIUM SERPL-MCNC: 10.4 MG/DL — SIGNIFICANT CHANGE UP (ref 8.4–10.5)
CHLORIDE BLDV-SCNC: 99 MMOL/L — SIGNIFICANT CHANGE UP (ref 96–108)
CHLORIDE SERPL-SCNC: 99 MMOL/L — SIGNIFICANT CHANGE UP (ref 96–108)
CO2 BLDV-SCNC: 43 MMOL/L — HIGH (ref 22–26)
CO2 SERPL-SCNC: 31 MMOL/L — SIGNIFICANT CHANGE UP (ref 22–31)
COLOR SPEC: YELLOW — SIGNIFICANT CHANGE UP
CREAT SERPL-MCNC: 1.93 MG/DL — HIGH (ref 0.5–1.3)
DIFF PNL FLD: NEGATIVE — SIGNIFICANT CHANGE UP
EGFR: 28 ML/MIN/1.73M2 — LOW
EOSINOPHIL # BLD AUTO: 0.28 K/UL — SIGNIFICANT CHANGE UP (ref 0–0.5)
EOSINOPHIL NFR BLD AUTO: 2.5 % — SIGNIFICANT CHANGE UP (ref 0–6)
EPI CELLS # UR: 1 /HPF — SIGNIFICANT CHANGE UP
GAS PNL BLDV: 138 MMOL/L — SIGNIFICANT CHANGE UP (ref 136–145)
GAS PNL BLDV: SIGNIFICANT CHANGE UP
GAS PNL BLDV: SIGNIFICANT CHANGE UP
GLUCOSE BLDC GLUCOMTR-MCNC: 89 MG/DL — SIGNIFICANT CHANGE UP (ref 70–99)
GLUCOSE BLDV-MCNC: 45 MG/DL — CRITICAL LOW (ref 70–99)
GLUCOSE SERPL-MCNC: 47 MG/DL — CRITICAL LOW (ref 70–99)
GLUCOSE UR QL: NEGATIVE — SIGNIFICANT CHANGE UP
HCO3 BLDV-SCNC: 41 MMOL/L — HIGH (ref 22–29)
HCT VFR BLD CALC: 42.3 % — SIGNIFICANT CHANGE UP (ref 34.5–45)
HCT VFR BLDA CALC: 39 % — SIGNIFICANT CHANGE UP (ref 34.5–46.5)
HGB BLD CALC-MCNC: 13.1 G/DL — SIGNIFICANT CHANGE UP (ref 11.7–16.1)
HGB BLD-MCNC: 12.8 G/DL — SIGNIFICANT CHANGE UP (ref 11.5–15.5)
HYALINE CASTS # UR AUTO: 0 /LPF — SIGNIFICANT CHANGE UP (ref 0–2)
IMM GRANULOCYTES NFR BLD AUTO: 0.7 % — SIGNIFICANT CHANGE UP (ref 0–0.9)
INR BLD: 1.46 RATIO — HIGH (ref 0.85–1.18)
KETONES UR-MCNC: NEGATIVE — SIGNIFICANT CHANGE UP
LACTATE BLDV-MCNC: 1.4 MMOL/L — SIGNIFICANT CHANGE UP (ref 0.5–2)
LEUKOCYTE ESTERASE UR-ACNC: NEGATIVE — SIGNIFICANT CHANGE UP
LIDOCAIN IGE QN: 16 U/L — SIGNIFICANT CHANGE UP (ref 7–60)
LYMPHOCYTES # BLD AUTO: 1.45 K/UL — SIGNIFICANT CHANGE UP (ref 1–3.3)
LYMPHOCYTES # BLD AUTO: 12.8 % — LOW (ref 13–44)
MAGNESIUM SERPL-MCNC: 2.5 MG/DL — SIGNIFICANT CHANGE UP (ref 1.6–2.6)
MCHC RBC-ENTMCNC: 27.7 PG — SIGNIFICANT CHANGE UP (ref 27–34)
MCHC RBC-ENTMCNC: 30.3 GM/DL — LOW (ref 32–36)
MCV RBC AUTO: 91.6 FL — SIGNIFICANT CHANGE UP (ref 80–100)
MONOCYTES # BLD AUTO: 1.14 K/UL — HIGH (ref 0–0.9)
MONOCYTES NFR BLD AUTO: 10.1 % — SIGNIFICANT CHANGE UP (ref 2–14)
NEUTROPHILS # BLD AUTO: 8.33 K/UL — HIGH (ref 1.8–7.4)
NEUTROPHILS NFR BLD AUTO: 73.4 % — SIGNIFICANT CHANGE UP (ref 43–77)
NITRITE UR-MCNC: NEGATIVE — SIGNIFICANT CHANGE UP
NRBC # BLD: 0 /100 WBCS — SIGNIFICANT CHANGE UP (ref 0–0)
NT-PROBNP SERPL-SCNC: 1188 PG/ML — HIGH (ref 0–300)
PCO2 BLDV: 59 MMHG — HIGH (ref 39–42)
PH BLDV: 7.45 — HIGH (ref 7.32–7.43)
PH UR: 6.5 — SIGNIFICANT CHANGE UP (ref 5–8)
PLATELET # BLD AUTO: 247 K/UL — SIGNIFICANT CHANGE UP (ref 150–400)
PO2 BLDV: 49 MMHG — HIGH (ref 25–45)
POTASSIUM BLDV-SCNC: 4.2 MMOL/L — SIGNIFICANT CHANGE UP (ref 3.5–5.1)
POTASSIUM SERPL-MCNC: 4 MMOL/L — SIGNIFICANT CHANGE UP (ref 3.5–5.3)
POTASSIUM SERPL-SCNC: 4 MMOL/L — SIGNIFICANT CHANGE UP (ref 3.5–5.3)
PROCALCITONIN SERPL-MCNC: 0.45 NG/ML — HIGH (ref 0.02–0.1)
PROT SERPL-MCNC: 7.1 G/DL — SIGNIFICANT CHANGE UP (ref 6–8.3)
PROT UR-MCNC: SIGNIFICANT CHANGE UP
PROTHROM AB SERPL-ACNC: 15.9 SEC — HIGH (ref 9.5–13)
RBC # BLD: 4.62 M/UL — SIGNIFICANT CHANGE UP (ref 3.8–5.2)
RBC # FLD: 15.4 % — HIGH (ref 10.3–14.5)
RBC CASTS # UR COMP ASSIST: 0 /HPF — SIGNIFICANT CHANGE UP (ref 0–4)
SAO2 % BLDV: 79 % — SIGNIFICANT CHANGE UP (ref 67–88)
SODIUM SERPL-SCNC: 144 MMOL/L — SIGNIFICANT CHANGE UP (ref 135–145)
SP GR SPEC: 1.01 — SIGNIFICANT CHANGE UP (ref 1.01–1.02)
TROPONIN T, HIGH SENSITIVITY RESULT: 83 NG/L — HIGH (ref 0–51)
UROBILINOGEN FLD QL: ABNORMAL
WBC # BLD: 11.34 K/UL — HIGH (ref 3.8–10.5)
WBC # FLD AUTO: 11.34 K/UL — HIGH (ref 3.8–10.5)
WBC UR QL: 3 /HPF — SIGNIFICANT CHANGE UP (ref 0–5)

## 2023-09-17 PROCEDURE — 74176 CT ABD & PELVIS W/O CONTRAST: CPT | Mod: 26,MA

## 2023-09-17 PROCEDURE — 71045 X-RAY EXAM CHEST 1 VIEW: CPT | Mod: 26

## 2023-09-17 PROCEDURE — 99285 EMERGENCY DEPT VISIT HI MDM: CPT

## 2023-09-17 RX ORDER — DEXTROSE 50 % IN WATER 50 %
50 SYRINGE (ML) INTRAVENOUS ONCE
Refills: 0 | Status: COMPLETED | OUTPATIENT
Start: 2023-09-17 | End: 2023-09-17

## 2023-09-17 RX ORDER — MORPHINE SULFATE 50 MG/1
4 CAPSULE, EXTENDED RELEASE ORAL ONCE
Refills: 0 | Status: DISCONTINUED | OUTPATIENT
Start: 2023-09-17 | End: 2023-09-17

## 2023-09-17 RX ORDER — SODIUM CHLORIDE 9 MG/ML
1000 INJECTION, SOLUTION INTRAVENOUS
Refills: 0 | Status: DISCONTINUED | OUTPATIENT
Start: 2023-09-17 | End: 2023-09-18

## 2023-09-17 RX ORDER — DEXTROSE 50 % IN WATER 50 %
25 SYRINGE (ML) INTRAVENOUS ONCE
Refills: 0 | Status: COMPLETED | OUTPATIENT
Start: 2023-09-17 | End: 2023-09-17

## 2023-09-17 RX ORDER — OXYCODONE HYDROCHLORIDE 5 MG/1
10 TABLET ORAL ONCE
Refills: 0 | Status: DISCONTINUED | OUTPATIENT
Start: 2023-09-17 | End: 2023-09-17

## 2023-09-17 RX ORDER — SODIUM CHLORIDE 9 MG/ML
500 INJECTION INTRAMUSCULAR; INTRAVENOUS; SUBCUTANEOUS ONCE
Refills: 0 | Status: DISCONTINUED | OUTPATIENT
Start: 2023-09-17 | End: 2023-09-17

## 2023-09-17 RX ORDER — SODIUM CHLORIDE 9 MG/ML
1000 INJECTION, SOLUTION INTRAVENOUS
Refills: 0 | Status: DISCONTINUED | OUTPATIENT
Start: 2023-09-17 | End: 2023-09-17

## 2023-09-17 RX ORDER — LIDOCAINE HCL 20 MG/ML
5 VIAL (ML) INJECTION ONCE
Refills: 0 | Status: COMPLETED | OUTPATIENT
Start: 2023-09-17 | End: 2023-09-17

## 2023-09-17 RX ORDER — ACETAMINOPHEN 500 MG
1000 TABLET ORAL ONCE
Refills: 0 | Status: COMPLETED | OUTPATIENT
Start: 2023-09-17 | End: 2023-09-17

## 2023-09-17 RX ADMIN — MORPHINE SULFATE 4 MILLIGRAM(S): 50 CAPSULE, EXTENDED RELEASE ORAL at 23:55

## 2023-09-17 RX ADMIN — Medication 400 MILLIGRAM(S): at 16:29

## 2023-09-17 RX ADMIN — Medication 5 MILLILITER(S): at 16:28

## 2023-09-17 RX ADMIN — SODIUM CHLORIDE 100 MILLILITER(S): 9 INJECTION, SOLUTION INTRAVENOUS at 19:24

## 2023-09-17 RX ADMIN — MORPHINE SULFATE 4 MILLIGRAM(S): 50 CAPSULE, EXTENDED RELEASE ORAL at 21:34

## 2023-09-17 RX ADMIN — OXYCODONE HYDROCHLORIDE 10 MILLIGRAM(S): 5 TABLET ORAL at 21:33

## 2023-09-17 RX ADMIN — Medication 25 MILLILITER(S): at 18:04

## 2023-09-17 RX ADMIN — Medication 50 MILLILITER(S): at 16:04

## 2023-09-17 NOTE — ED PROVIDER NOTE - OBJECTIVE STATEMENT
68-year-old female history of asthma, heart failure, DM, CKD, DVT on Eliquis presenting for chest pain and abdominal pain.  Patient was seen at [facility] 2 weeks ago and discharged after a fall.  Patient reports that since she was discharged she has not been eating has had nausea and has not been able to use the bathroom for 12 days.  Patient reports that she also has abdominal pain and rectal pain.  Patient reports that symptoms got worse last night.  Patient is also reporting intermittent chest pain.  Patient does have a history of having chest pain and this feels like her typical chest pain localized to the center of the chest.  Patient denies recent fevers, chills, headache, visual changes, urinary symptoms, recent sick.

## 2023-09-17 NOTE — ED PROVIDER NOTE - CARE PLAN
Principal Discharge DX:	Hypoglycemia  Secondary Diagnosis:	Constipation  Secondary Diagnosis:	Chest pain of uncertain etiology  Secondary Diagnosis:	Undifferentiated abdominal pain   1

## 2023-09-17 NOTE — ED ADULT NURSE REASSESSMENT NOTE - NS ED NURSE REASSESS COMMENT FT1
Patient straight cathed using sterile technique. Second RN at bedside to confirm sterility. Patient tolerated procedure well. Output of approximately 775cc's of keyanna clear urine. Sterile specimens collected and sent to lab. Pt tolerated well, successful after one attempt. Patient provided perineal care and placed on Primafit to suction.

## 2023-09-17 NOTE — ED PROVIDER NOTE - ATTENDING CONTRIBUTION TO CARE
------------ATTENDING NOTE------------ ------------ATTENDING NOTE------------  pt w/ son brought to ED by EMS after home health called for concerns of nausea, decreased PO, mild central chest ache, diffuse abdominal cramping and mild distension, constipation w/ last BM over a week ago, no fevers, complicated by morbid obesity, recently bedbound, significant underlying comorbidities, found to be hypoglycemic, awaiting labs/imaging and close reassessments -->  - Hitesh Espinal MD   --------------------------------------------- ------------ATTENDING NOTE------------  pt w/ son brought to ED by EMS after home health called for concerns of nausea, decreased PO, mild central chest ache, diffuse abdominal cramping and mild distension, constipation w/ last BM over a week ago, no fevers, complicated by morbid obesity, recently bedbound, significant underlying comorbidities, found to be hypoglycemic, awaiting labs/imaging and close reassessments --> remained stable w/ recurrent lower blood glucose (not symptomatic), initial labs wnl, CT w/o acute process, ED sign out 2300 pending admission for continued hanna tx, optimize medical mgmt, outpt needs assessments.  - Hitesh Espinal MD   ---------------------------------------------

## 2023-09-17 NOTE — ED ADULT NURSE REASSESSMENT NOTE - NS ED NURSE REASSESS COMMENT FT1
Received report from DANIEL Cardona. Patient a/ox4, lying in the stretcher. Patient denies nausea, vomiting, chest pain, dyspnea and lightheadedness. As per MD Espinal, patient is okay to go to monitor off to CT scan. Patient on CM, NSR. Safety and comfort provided.

## 2023-09-17 NOTE — ED PROVIDER NOTE - PROGRESS NOTE DETAILS
Mendoza, PGY-3, EM: pt aware of results. discussed need for admission given persistent hypoglycemia. while discussing results pt eating some food.

## 2023-09-17 NOTE — ED PROVIDER NOTE - PHYSICAL EXAMINATION
GENERAL: Obsese, awake, alert, NAD  HEENT: NC/AT, moist mucous membranes, PERRL, EOMI  LUNGS: CTAB, no wheezes or crackles   CARDIAC: RRR, no m/r/g  ABDOMEN: Soft, TTP diffusely, non distended, no rebound, no guarding  GI: stool appreciated in the rectal vault, soft, not able to disimpact on exam.   BACK: No midline spinal tenderness, no CVA tenderness  EXT: No edema, no calf tenderness, 2+ DP pulses bilaterally, no deformities.  NEURO: A&Ox3. Moving all extremities.  SKIN: Warm and dry. No rash.  PSYCH: Normal affect.

## 2023-09-17 NOTE — ED ADULT NURSE NOTE - OBJECTIVE STATEMENT
The patient is a 68y female BIBA for complaints of chest pain. Patient reports having a PMH of Asthma, Cataract, Cervical Stenosis of Spine, CK, DM, Chronic Pain, and history of DVT. Patient reports the onset of chest pain presenting with SOB as well abdominal today. EMS reports en route to the ED the patients BG  was 50. Upon assessment PT is breathing spontaneous and labored on NC 6L. She reports @ home her baseline NC is 4L. No signs of respratory distress @ this time. She is alert and orientated x4 and responds to questions appropiately and complete sentences. She reports chest pain that she describes as a cramping sensation and chronic pain that is generalized. Note she presents with a scale like rash to the bilateral lower extremeties in which the left lower extremity is dressed and wrap. Repeat BG in the ED was 52. Pt denies palpitations, visual disturbances, numbness/tingling, fever, chills, diaphoresis,  nausea, vomiting, constipation, diarrhea, or urinary symptoms. The patient is a 68y female BIBA for complaints of chest pain. Patient reports having a PMH of Asthma, Cataract, Cervical Stenosis of Spine, CK, DM, Chronic Pain, and history of DVT. Patient reports the onset of chest pain presenting with SOB as well abdominal today. EMS reports en route to the ED the patients BG  was 50. Upon assessment PT is breathing spontaneous and labored on NC 6L. She reports @ home her baseline NC is 4L. No signs of respiratory distress @ this time. She is alert and orientated x4 and responds to questions appropriately and complete sentences. She reports chest pain that she describes as a cramping sensation and chronic pain that is generalized. Note she presents with a scale like rash to the bilateral lower extremities in which the left lower extremity is dressed and wrap. Repeat BG in the ED was 52. Pt denies palpitations, visual disturbances, numbness/tingling, fever, chills, diaphoresis,  nausea, vomiting, constipation, diarrhea, or urinary symptoms. Safety and comfort measures provided, bed locked and in lowest position, side rails up for safety. Call bell within reach. PT placed on bariatric bed using the Hovermat device, Cardiac monitor in place, and awaiting MD Reassessment.

## 2023-09-18 DIAGNOSIS — E11.649 TYPE 2 DIABETES MELLITUS WITH HYPOGLYCEMIA WITHOUT COMA: ICD-10-CM

## 2023-09-18 DIAGNOSIS — R09.89 OTHER SPECIFIED SYMPTOMS AND SIGNS INVOLVING THE CIRCULATORY AND RESPIRATORY SYSTEMS: ICD-10-CM

## 2023-09-18 DIAGNOSIS — I10 ESSENTIAL (PRIMARY) HYPERTENSION: ICD-10-CM

## 2023-09-18 DIAGNOSIS — E27.8 OTHER SPECIFIED DISORDERS OF ADRENAL GLAND: ICD-10-CM

## 2023-09-18 DIAGNOSIS — Z29.9 ENCOUNTER FOR PROPHYLACTIC MEASURES, UNSPECIFIED: ICD-10-CM

## 2023-09-18 DIAGNOSIS — E78.5 HYPERLIPIDEMIA, UNSPECIFIED: ICD-10-CM

## 2023-09-18 DIAGNOSIS — K62.89 OTHER SPECIFIED DISEASES OF ANUS AND RECTUM: ICD-10-CM

## 2023-09-18 DIAGNOSIS — N18.30 CHRONIC KIDNEY DISEASE, STAGE 3 UNSPECIFIED: ICD-10-CM

## 2023-09-18 LAB
A1C WITH ESTIMATED AVERAGE GLUCOSE RESULT: 7.2 % — HIGH (ref 4–5.6)
ANION GAP SERPL CALC-SCNC: 14 MMOL/L — SIGNIFICANT CHANGE UP (ref 5–17)
BASOPHILS # BLD AUTO: 0.07 K/UL — SIGNIFICANT CHANGE UP (ref 0–0.2)
BASOPHILS NFR BLD AUTO: 0.7 % — SIGNIFICANT CHANGE UP (ref 0–2)
BUN SERPL-MCNC: 42 MG/DL — HIGH (ref 7–23)
CALCIUM SERPL-MCNC: 9.5 MG/DL — SIGNIFICANT CHANGE UP (ref 8.4–10.5)
CHLORIDE SERPL-SCNC: 98 MMOL/L — SIGNIFICANT CHANGE UP (ref 96–108)
CO2 SERPL-SCNC: 29 MMOL/L — SIGNIFICANT CHANGE UP (ref 22–31)
CREAT SERPL-MCNC: 1.87 MG/DL — HIGH (ref 0.5–1.3)
EGFR: 29 ML/MIN/1.73M2 — LOW
EOSINOPHIL # BLD AUTO: 0.5 K/UL — SIGNIFICANT CHANGE UP (ref 0–0.5)
EOSINOPHIL NFR BLD AUTO: 5.2 % — SIGNIFICANT CHANGE UP (ref 0–6)
ESTIMATED AVERAGE GLUCOSE: 160 MG/DL — HIGH (ref 68–114)
GLUCOSE BLDC GLUCOMTR-MCNC: 129 MG/DL — HIGH (ref 70–99)
GLUCOSE BLDC GLUCOMTR-MCNC: 143 MG/DL — HIGH (ref 70–99)
GLUCOSE BLDC GLUCOMTR-MCNC: 196 MG/DL — HIGH (ref 70–99)
GLUCOSE BLDC GLUCOMTR-MCNC: 251 MG/DL — HIGH (ref 70–99)
GLUCOSE SERPL-MCNC: 127 MG/DL — HIGH (ref 70–99)
HCT VFR BLD CALC: 40.3 % — SIGNIFICANT CHANGE UP (ref 34.5–45)
HGB BLD-MCNC: 12 G/DL — SIGNIFICANT CHANGE UP (ref 11.5–15.5)
IMM GRANULOCYTES NFR BLD AUTO: 0.6 % — SIGNIFICANT CHANGE UP (ref 0–0.9)
LYMPHOCYTES # BLD AUTO: 1.09 K/UL — SIGNIFICANT CHANGE UP (ref 1–3.3)
LYMPHOCYTES # BLD AUTO: 11.4 % — LOW (ref 13–44)
MAGNESIUM SERPL-MCNC: 2.3 MG/DL — SIGNIFICANT CHANGE UP (ref 1.6–2.6)
MCHC RBC-ENTMCNC: 28.1 PG — SIGNIFICANT CHANGE UP (ref 27–34)
MCHC RBC-ENTMCNC: 29.8 GM/DL — LOW (ref 32–36)
MCV RBC AUTO: 94.4 FL — SIGNIFICANT CHANGE UP (ref 80–100)
MONOCYTES # BLD AUTO: 0.92 K/UL — HIGH (ref 0–0.9)
MONOCYTES NFR BLD AUTO: 9.7 % — SIGNIFICANT CHANGE UP (ref 2–14)
NEUTROPHILS # BLD AUTO: 6.89 K/UL — SIGNIFICANT CHANGE UP (ref 1.8–7.4)
NEUTROPHILS NFR BLD AUTO: 72.4 % — SIGNIFICANT CHANGE UP (ref 43–77)
NRBC # BLD: 0 /100 WBCS — SIGNIFICANT CHANGE UP (ref 0–0)
PHOSPHATE SERPL-MCNC: 3.5 MG/DL — SIGNIFICANT CHANGE UP (ref 2.5–4.5)
PLATELET # BLD AUTO: 199 K/UL — SIGNIFICANT CHANGE UP (ref 150–400)
POTASSIUM SERPL-MCNC: 3.9 MMOL/L — SIGNIFICANT CHANGE UP (ref 3.5–5.3)
POTASSIUM SERPL-SCNC: 3.9 MMOL/L — SIGNIFICANT CHANGE UP (ref 3.5–5.3)
RBC # BLD: 4.27 M/UL — SIGNIFICANT CHANGE UP (ref 3.8–5.2)
RBC # FLD: 15.6 % — HIGH (ref 10.3–14.5)
SODIUM SERPL-SCNC: 141 MMOL/L — SIGNIFICANT CHANGE UP (ref 135–145)
T3 SERPL-MCNC: 71 NG/DL — LOW (ref 80–200)
T4 AB SER-ACNC: 6.4 UG/DL — SIGNIFICANT CHANGE UP (ref 4.6–12)
TROPONIN T, HIGH SENSITIVITY RESULT: 78 NG/L — HIGH (ref 0–51)
TSH SERPL-MCNC: 3.5 UIU/ML — SIGNIFICANT CHANGE UP (ref 0.27–4.2)
WBC # BLD: 9.53 K/UL — SIGNIFICANT CHANGE UP (ref 3.8–10.5)
WBC # FLD AUTO: 9.53 K/UL — SIGNIFICANT CHANGE UP (ref 3.8–10.5)

## 2023-09-18 PROCEDURE — 99223 1ST HOSP IP/OBS HIGH 75: CPT

## 2023-09-18 RX ORDER — DEXTROSE 50 % IN WATER 50 %
12.5 SYRINGE (ML) INTRAVENOUS ONCE
Refills: 0 | Status: DISCONTINUED | OUTPATIENT
Start: 2023-09-18 | End: 2023-09-29

## 2023-09-18 RX ORDER — SODIUM CHLORIDE 9 MG/ML
1000 INJECTION, SOLUTION INTRAVENOUS
Refills: 0 | Status: DISCONTINUED | OUTPATIENT
Start: 2023-09-18 | End: 2023-09-18

## 2023-09-18 RX ORDER — ONDANSETRON 8 MG/1
4 TABLET, FILM COATED ORAL ONCE
Refills: 0 | Status: COMPLETED | OUTPATIENT
Start: 2023-09-18 | End: 2023-09-18

## 2023-09-18 RX ORDER — APIXABAN 2.5 MG/1
5 TABLET, FILM COATED ORAL EVERY 12 HOURS
Refills: 0 | Status: DISCONTINUED | OUTPATIENT
Start: 2023-09-18 | End: 2023-09-29

## 2023-09-18 RX ORDER — SENNA PLUS 8.6 MG/1
2 TABLET ORAL AT BEDTIME
Refills: 0 | Status: DISCONTINUED | OUTPATIENT
Start: 2023-09-18 | End: 2023-09-29

## 2023-09-18 RX ORDER — SODIUM CHLORIDE 9 MG/ML
1000 INJECTION, SOLUTION INTRAVENOUS
Refills: 0 | Status: DISCONTINUED | OUTPATIENT
Start: 2023-09-18 | End: 2023-09-29

## 2023-09-18 RX ORDER — ATORVASTATIN CALCIUM 80 MG/1
80 TABLET, FILM COATED ORAL AT BEDTIME
Refills: 0 | Status: DISCONTINUED | OUTPATIENT
Start: 2023-09-18 | End: 2023-09-29

## 2023-09-18 RX ORDER — NITROGLYCERIN 6.5 MG
1 CAPSULE, EXTENDED RELEASE ORAL
Refills: 0 | Status: DISCONTINUED | OUTPATIENT
Start: 2023-09-18 | End: 2023-09-29

## 2023-09-18 RX ORDER — ACETAMINOPHEN 500 MG
650 TABLET ORAL EVERY 6 HOURS
Refills: 0 | Status: DISCONTINUED | OUTPATIENT
Start: 2023-09-18 | End: 2023-09-19

## 2023-09-18 RX ORDER — OXYCODONE HYDROCHLORIDE 5 MG/1
60 TABLET ORAL EVERY 12 HOURS
Refills: 0 | Status: DISCONTINUED | OUTPATIENT
Start: 2023-09-18 | End: 2023-09-25

## 2023-09-18 RX ORDER — LACTULOSE 10 G/15ML
10 SOLUTION ORAL ONCE
Refills: 0 | Status: COMPLETED | OUTPATIENT
Start: 2023-09-18 | End: 2023-09-18

## 2023-09-18 RX ORDER — LORATADINE 10 MG/1
10 TABLET ORAL DAILY
Refills: 0 | Status: DISCONTINUED | OUTPATIENT
Start: 2023-09-18 | End: 2023-09-29

## 2023-09-18 RX ORDER — DEXTROSE 50 % IN WATER 50 %
15 SYRINGE (ML) INTRAVENOUS ONCE
Refills: 0 | Status: DISCONTINUED | OUTPATIENT
Start: 2023-09-18 | End: 2023-09-29

## 2023-09-18 RX ORDER — ASPIRIN/CALCIUM CARB/MAGNESIUM 324 MG
81 TABLET ORAL DAILY
Refills: 0 | Status: DISCONTINUED | OUTPATIENT
Start: 2023-09-18 | End: 2023-09-29

## 2023-09-18 RX ORDER — DEXTROSE 50 % IN WATER 50 %
25 SYRINGE (ML) INTRAVENOUS ONCE
Refills: 0 | Status: DISCONTINUED | OUTPATIENT
Start: 2023-09-18 | End: 2023-09-29

## 2023-09-18 RX ORDER — INSULIN LISPRO 100/ML
VIAL (ML) SUBCUTANEOUS
Refills: 0 | Status: DISCONTINUED | OUTPATIENT
Start: 2023-09-18 | End: 2023-09-18

## 2023-09-18 RX ORDER — ALBUTEROL 90 UG/1
2 AEROSOL, METERED ORAL EVERY 6 HOURS
Refills: 0 | Status: DISCONTINUED | OUTPATIENT
Start: 2023-09-18 | End: 2023-09-29

## 2023-09-18 RX ORDER — ACETAMINOPHEN 500 MG
1000 TABLET ORAL ONCE
Refills: 0 | Status: COMPLETED | OUTPATIENT
Start: 2023-09-18 | End: 2023-09-18

## 2023-09-18 RX ORDER — CADEXOMER IODINE 0.9 %
1 PADS, MEDICATED (EA) TOPICAL DAILY
Refills: 0 | Status: DISCONTINUED | OUTPATIENT
Start: 2023-09-18 | End: 2023-09-29

## 2023-09-18 RX ORDER — INSULIN LISPRO 100/ML
VIAL (ML) SUBCUTANEOUS AT BEDTIME
Refills: 0 | Status: DISCONTINUED | OUTPATIENT
Start: 2023-09-18 | End: 2023-09-18

## 2023-09-18 RX ORDER — BUDESONIDE AND FORMOTEROL FUMARATE DIHYDRATE 160; 4.5 UG/1; UG/1
2 AEROSOL RESPIRATORY (INHALATION)
Refills: 0 | Status: DISCONTINUED | OUTPATIENT
Start: 2023-09-18 | End: 2023-09-29

## 2023-09-18 RX ORDER — MORPHINE SULFATE 50 MG/1
4 CAPSULE, EXTENDED RELEASE ORAL ONCE
Refills: 0 | Status: DISCONTINUED | OUTPATIENT
Start: 2023-09-18 | End: 2023-09-18

## 2023-09-18 RX ORDER — GLUCAGON INJECTION, SOLUTION 0.5 MG/.1ML
1 INJECTION, SOLUTION SUBCUTANEOUS ONCE
Refills: 0 | Status: DISCONTINUED | OUTPATIENT
Start: 2023-09-18 | End: 2023-09-29

## 2023-09-18 RX ORDER — FUROSEMIDE 40 MG
40 TABLET ORAL DAILY
Refills: 0 | Status: DISCONTINUED | OUTPATIENT
Start: 2023-09-18 | End: 2023-09-29

## 2023-09-18 RX ORDER — POLYETHYLENE GLYCOL 3350 17 G/17G
17 POWDER, FOR SOLUTION ORAL
Refills: 0 | Status: DISCONTINUED | OUTPATIENT
Start: 2023-09-18 | End: 2023-09-29

## 2023-09-18 RX ORDER — OXYCODONE HYDROCHLORIDE 5 MG/1
10 TABLET ORAL ONCE
Refills: 0 | Status: DISCONTINUED | OUTPATIENT
Start: 2023-09-18 | End: 2023-09-18

## 2023-09-18 RX ADMIN — POLYETHYLENE GLYCOL 3350 17 GRAM(S): 17 POWDER, FOR SOLUTION ORAL at 18:27

## 2023-09-18 RX ADMIN — OXYCODONE HYDROCHLORIDE 10 MILLIGRAM(S): 5 TABLET ORAL at 14:05

## 2023-09-18 RX ADMIN — OXYCODONE HYDROCHLORIDE 60 MILLIGRAM(S): 5 TABLET ORAL at 06:09

## 2023-09-18 RX ADMIN — SODIUM CHLORIDE 1000 MILLILITER(S): 9 INJECTION, SOLUTION INTRAVENOUS at 00:00

## 2023-09-18 RX ADMIN — OXYCODONE HYDROCHLORIDE 10 MILLIGRAM(S): 5 TABLET ORAL at 14:00

## 2023-09-18 RX ADMIN — APIXABAN 5 MILLIGRAM(S): 2.5 TABLET, FILM COATED ORAL at 18:26

## 2023-09-18 RX ADMIN — SENNA PLUS 2 TABLET(S): 8.6 TABLET ORAL at 22:17

## 2023-09-18 RX ADMIN — SODIUM CHLORIDE 100 MILLILITER(S): 9 INJECTION, SOLUTION INTRAVENOUS at 00:09

## 2023-09-18 RX ADMIN — SODIUM CHLORIDE 100 MILLILITER(S): 9 INJECTION, SOLUTION INTRAVENOUS at 11:44

## 2023-09-18 RX ADMIN — Medication 650 MILLIGRAM(S): at 03:05

## 2023-09-18 RX ADMIN — BUDESONIDE AND FORMOTEROL FUMARATE DIHYDRATE 2 PUFF(S): 160; 4.5 AEROSOL RESPIRATORY (INHALATION) at 06:10

## 2023-09-18 RX ADMIN — Medication 400 MILLIGRAM(S): at 09:23

## 2023-09-18 RX ADMIN — SODIUM CHLORIDE 100 MILLILITER(S): 9 INJECTION, SOLUTION INTRAVENOUS at 06:09

## 2023-09-18 RX ADMIN — LORATADINE 10 MILLIGRAM(S): 10 TABLET ORAL at 11:44

## 2023-09-18 RX ADMIN — POLYETHYLENE GLYCOL 3350 17 GRAM(S): 17 POWDER, FOR SOLUTION ORAL at 06:09

## 2023-09-18 RX ADMIN — MORPHINE SULFATE 4 MILLIGRAM(S): 50 CAPSULE, EXTENDED RELEASE ORAL at 10:15

## 2023-09-18 RX ADMIN — Medication 0: at 11:45

## 2023-09-18 RX ADMIN — Medication 0: at 08:14

## 2023-09-18 RX ADMIN — Medication 650 MILLIGRAM(S): at 22:17

## 2023-09-18 RX ADMIN — Medication 40 MILLIGRAM(S): at 06:09

## 2023-09-18 RX ADMIN — MORPHINE SULFATE 4 MILLIGRAM(S): 50 CAPSULE, EXTENDED RELEASE ORAL at 09:52

## 2023-09-18 RX ADMIN — Medication 1 APPLICATION(S): at 18:27

## 2023-09-18 RX ADMIN — BUDESONIDE AND FORMOTEROL FUMARATE DIHYDRATE 2 PUFF(S): 160; 4.5 AEROSOL RESPIRATORY (INHALATION) at 18:28

## 2023-09-18 RX ADMIN — LACTULOSE 10 GRAM(S): 10 SOLUTION ORAL at 18:27

## 2023-09-18 RX ADMIN — Medication 1000 MILLIGRAM(S): at 09:53

## 2023-09-18 RX ADMIN — OXYCODONE HYDROCHLORIDE 60 MILLIGRAM(S): 5 TABLET ORAL at 18:26

## 2023-09-18 RX ADMIN — Medication 81 MILLIGRAM(S): at 11:44

## 2023-09-18 RX ADMIN — ATORVASTATIN CALCIUM 80 MILLIGRAM(S): 80 TABLET, FILM COATED ORAL at 22:17

## 2023-09-18 RX ADMIN — ONDANSETRON 4 MILLIGRAM(S): 8 TABLET, FILM COATED ORAL at 18:26

## 2023-09-18 RX ADMIN — Medication 1 APPLICATION(S): at 06:21

## 2023-09-18 RX ADMIN — APIXABAN 5 MILLIGRAM(S): 2.5 TABLET, FILM COATED ORAL at 06:09

## 2023-09-18 NOTE — CHART NOTE - NSCHARTNOTEFT_GEN_A_CORE
67yo F w/ PMH of HTN, HLD, DM2, HFpEF, CKD III, DVT, Uterine CA and recent hospitalization at [facility] from 08/30-09/08 for dyspnea thought likely 2/2 PE and severe pulm HTN, now on Eliquis, pw constipation and rectal pain.   Seen and examined at bedside this morning. Still has not had a BM yet. Denies CP, SOB, abd pain, diarrhea, dysuria.   Rectal exam done by ED team showing - no obvious hemorrhoids, stool in the rectal vault, soft, not able to disimpact on exam.   CTAP: No acute intra-abdominal finding.  C/w bowel regimen   Hypoglycemic to 40s in setting of insulin use and poor PO intake. C/w D5LR for now. FS improved. Hold insulin; liberalize diet. Closely monitor FS.   See same day H&P for further details.

## 2023-09-18 NOTE — CHART NOTE - NSCHARTNOTEFT_GEN_A_CORE
This report was requested by: Karol Rodriguez | Reference #: [id number]    Practitioner Count: 2  Pharmacy Count: 2  Current Opioid Prescriptions: 0  Current Benzodiazepine Prescriptions: 0  Current Stimulant Prescriptions: 0      Patient Demographic Information (PDI)       PDI	First Name	Last Name	Birth Date	Gender	Street Address	[facility] Code  A	Yanet Perez	1955	Female	[address]	[address]  B	Yanet Perez	1955	Female	[address] [facility]	[address]    Prescription Information      PDI Filter:    PDI	Current Rx	Drug Type	Rx Written	Rx Dispensed	Drug	Quantity	Days Supply	Prescriber Name	Prescriber KATE #	Payment Method	Dispenser  A	N	O	09/14/2023	09/14/2023	oxycontin er 60 mg tablet	6	3	Juan Pablo Mitchell	[id number]	[facility]  B	N	O	08/11/2023	08/16/2023	oxycontin er 60 mg tablet	50	30	Hemal Ge MD	[id number]	Medicare	[id number]  B	N	O	07/14/2023	07/17/2023	oxycontin er 60 mg tablet	60	30	Hemal Ge MD	[id number]	Medicare	[id number]  B	N	O	06/14/2023	06/18/2023	oxycontin er 60 mg tablet	60	30	Nael Mendoza	[id number]	Medicare	[id number]  B	N	O	05/16/2023	05/22/2023	oxycontin er 60 mg tablet	50	30	Nael Mendoza	[id number]	Medicare	[id number]  B	N	O	05/16/2023	05/20/2023	oxycontin er 60 mg tablet	10	5	Nael Mendoza	[id number]	Medicare	[id number]  B	N	O	04/18/2023	04/21/2023	oxycontin er 60 mg tablet	60	30	Paula Mccann MD	[id number]	Medicare	[id number]  B	N	O	03/22/2023	03/23/2023	oxycontin er 60 mg tablet	60	30	Paula Mccann MD	[id number]	Medicare	[id number]  B	N	O	02/15/2023	02/22/2023	oxycontin er 60 mg tablet	54	30	Paula Mccann MD	[id number]	Medicare	[id number]  B	N	O	01/19/2023	01/24/2023	oxycontin er 60 mg tablet	60	30	Ana Villalobos	[id number]	Medicare	[id number]  B	N	O	12/19/2022	12/26/2022	oxycontin er 60 mg tablet	60	30	ZolPaula elise MD	[id number]	Medicare	[id number]  B	N	O	11/22/2022	11/26/2022	oxycontin er 60 mg tablet	60	30	ZolliPaula MD	[id number]	Medicare	[id number]  B	N	O	10/27/2022	10/28/2022	oxycontin er 60 mg tablet	50	30	ZolPaula elise MD	[id number]	Medicare	[id number]  B	N	O	09/19/2022	09/22/2022	oxycontin er 60 mg tablet	60	30	ZolPaula elise MD	[id number]	Medicare	[id number]

## 2023-09-18 NOTE — H&P ADULT - NSHPREVIEWOFSYSTEMS_GEN_ALL_CORE
CONSTITUTIONAL: No fever, weight loss  EYES: No eye pain, visual disturbances, or discharge  ENMT:  No difficulty hearing, tinnitus, vertigo; No sinus or throat pain  RESPIRATORY: No SOB. No cough, wheezing, chills or hemoptysis  CARDIOVASCULAR: No chest pain, palpitations, dizziness, or leg swelling  GASTROINTESTINAL: Abdominal pain. Constipation, nausea. No vomiting, or hematemesis; No diarrhea. No melena or hematochezia.  GENITOURINARY: No dysuria, frequency, hematuria, or incontinence  NEUROLOGICAL: No headaches, memory loss, loss of strength, numbness, or tremors  SKIN: No itching, burning, rashes, or lesions   LYMPH NODES: No enlarged glands  ENDOCRINE: No heat or cold intolerance; No hair loss  MUSCULOSKELETAL: No joint pain or swelling; No muscle, back pain  PSYCHIATRIC: No depression, anxiety, mood swings, or difficulty sleeping  HEME/LYMPH: No easy bruising, or bleeding gums

## 2023-09-18 NOTE — H&P ADULT - ASSESSMENT
69yo F w/ PMH of HTN, HLD, DM2, HFpEF, CKD III, DVT, Uterine CA and recent hospitalization at [facility] from 08/30-09/08 for dyspnea thought likely 2/2 PE and severe pulm HTN, now on Eliquis, pw rectal pain

## 2023-09-18 NOTE — H&P ADULT - NSHPLABSRESULTS_GEN_ALL_CORE
LABS:                      12.8   11.34 )-----------( 247      ( 17 Sep 2023 16:13 )             42.3         144  |  99  |  42<H>  ----------------------------<  47<LL>  4.0   |  31  |  1.93<H>    Ca    10.4      17 Sep 2023 16:13  Mg     2.5         TPro  7.1  /  Alb  4.0  /  TBili  1.0  /  DBili  x   /  AST  38  /  ALT  31  /  AlkPhos  228<H>      LIVER FUNCTIONS - ( 17 Sep 2023 16:13 )  Alb: 4.0 g/dL / Pro: 7.1 g/dL / ALK PHOS: 228 U/L / ALT: 31 U/L / AST: 38 U/L / GGT: x           PT/INR - ( 17 Sep 2023 16:14 )   PT: 15.9 sec;   INR: 1.46 ratio    PTT - ( 17 Sep 2023 16:14 )  PTT:33.6 sec    Urinalysis Basic - ( 17 Sep 2023 18:08 )  Color: Yellow / Appearance: Clear / S.013 / pH: x  Gluc: x / Ketone: Negative  / Bili: Negative / Urobili: 3 mg/dL   Blood: x / Protein: Trace / Nitrite: Negative   Leuk Esterase: Negative / RBC: 0 /hpf / WBC 3 /HPF   Sq Epi: x / Non Sq Epi: x / Bacteria: Negative    IMAGING:  CT Abdomen and Pelvis w/ Oral Cont (23 @ 21:15)  IMPRESSION:  - Limited evaluation secondary to part of the right abdomen out of field of view.  - No acute intra-abdominal finding.  - Indeterminate right adrenal nodule is unchanged in size.    [X] Imaging personally reviewed by me- Stool throughout   [X] ECG personally interpreted by me- NSR w/out acute ischemic changes

## 2023-09-18 NOTE — H&P ADULT - HISTORY OF PRESENT ILLNESS
Pt is a 69yo F w/ PMH of HTN, HLD, DM2, HFpEF, CKD III, DVT, Uterine CA and recent hospitalization at [facility] from 08/30-09/08 for dyspnea thought likely 2/2 PE and severe pulm HTN, now on Eliquis, pw rectal pain.    Since discharge from [facility] she reports decreased oral intake and nausea w/ no BM in the past 12 days. Still passing gas. Over the past 24 hours she reports rectal pain which occurs even with small movement. She otherwise denies any bleeding, no F/C, or urinary symptoms.     She did report some abd pain and CP in the ED though on encounter reports improvement of that pain in the lower quadrants as well as the atypical CP which she states she gets intermittently.    In the ED VSS w/ labs notable for hypoglycemia to 47, Cr of 1.93 and Trop 83- 78 and CXR w/ mild congestion and CT A/P w/out acute findings. She was administered D50 and started on D10/NS as well as administered Morphine 4mg x2.

## 2023-09-18 NOTE — H&P ADULT - NSHPPHYSICALEXAM_GEN_ALL_CORE
Vital Signs Last 24 Hrs  T(C): 36.7 (17 Sep 2023 23:52), Max: 36.8 (17 Sep 2023 18:00)  T(F): 98.1 (17 Sep 2023 23:52), Max: 98.3 (17 Sep 2023 18:00)  HR: 70 (18 Sep 2023 01:27) (67 - 75)  BP: 116/50 (18 Sep 2023 01:27) (116/50 - 146/74)  BP(mean): 68 (18 Sep 2023 01:27) (64 - 88)  RR: 20 (18 Sep 2023 01:27) (18 - 20)  SpO2: 96% (18 Sep 2023 01:27) (93% - 98%)    Parameters below as of 18 Sep 2023 01:27  Patient On (Oxygen Delivery Method): nasal cannula O2 Flow (L/min): 4    CONSTITUTIONAL: Well-groomed, in no apparent distress  EYES: No conjunctival or scleral injection, non-icteric;   ENMT: No external nasal lesions; MMM  NECK: Trachea midline without palpable neck mass; thyroid not enlarged and non-tender  RESPIRATORY: Breathing comfortably; no dullness to percussion; lungs CTA without wheeze/rhonchi/rales though limited d/t body habitus  CARDIOVASCULAR: Difficult examination d/t body habitus. +S1S2, RRR, no M/G/R; pedal pulses full and symmetric; no lower extremity edema  GASTROINTESTINAL: Surgical scars present. No palpable masses or tenderness, +BS throughout, no rebound/guarding; no hepatosplenomegaly; no hernia palpated  LYMPHATIC: No cervical LAD or tenderness  SKIN: Stasis dermatitis in LE. No rashes or ulcers noted  NEUROLOGIC: CN II-XII intact; sensation intact in LEs b/l to light touch  PSYCHIATRIC: A&Ox3; mood and affect appropriate; appropriate insight and judgment

## 2023-09-18 NOTE — H&P ADULT - PROBLEM SELECTOR PLAN 1
- Abd pain (intermittent) and rectal pain w/out reported BM in 2 weeks  - Stool on CT scan c/f constipation  - Rectal pain may also be d/t anal fissure vs thrombosed hemorrhoids  - Start bowel regimen given she is also on chronic opioids   - Start Nitroglycerin ointment rectally BID for possible fissure vs thrombosed hemorrhoids  - If rectal pain not improved can consider further examination of area and possible CRS consult
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
## 2023-09-18 NOTE — H&P ADULT - PROBLEM SELECTOR PLAN 4
- At baseline  - Monitor renal fxn qd  - Avoid nephrotoxic medications  - Renally dose all medications

## 2023-09-18 NOTE — H&P ADULT - PROBLEM SELECTOR PLAN 2
- On insulin at home, taking daily but has not been eating appropriately the past two weeks  - On D10+NS for now, will continue  - Monitor FS q4hrs  - Consider endo c/s in AM

## 2023-09-18 NOTE — H&P ADULT - PROBLEM SELECTOR PLAN 3
- [place] w/ elevated enzymes at OSH on recent admission w/ suspected PE at the time  - Now on Eliquis 5mg BID, continue

## 2023-09-19 LAB
GLUCOSE BLDC GLUCOMTR-MCNC: 181 MG/DL — HIGH (ref 70–99)
GLUCOSE BLDC GLUCOMTR-MCNC: 254 MG/DL — HIGH (ref 70–99)
GLUCOSE BLDC GLUCOMTR-MCNC: 281 MG/DL — HIGH (ref 70–99)

## 2023-09-19 PROCEDURE — 99233 SBSQ HOSP IP/OBS HIGH 50: CPT

## 2023-09-19 RX ORDER — ONDANSETRON 8 MG/1
4 TABLET, FILM COATED ORAL EVERY 8 HOURS
Refills: 0 | Status: DISCONTINUED | OUTPATIENT
Start: 2023-09-19 | End: 2023-09-29

## 2023-09-19 RX ORDER — ACETAMINOPHEN 500 MG
650 TABLET ORAL EVERY 6 HOURS
Refills: 0 | Status: DISCONTINUED | OUTPATIENT
Start: 2023-09-19 | End: 2023-09-29

## 2023-09-19 RX ORDER — METHYLNALTREXONE BROMIDE 12 MG/.6ML
12 INJECTION, SOLUTION SUBCUTANEOUS ONCE
Refills: 0 | Status: DISCONTINUED | OUTPATIENT
Start: 2023-09-19 | End: 2023-09-19

## 2023-09-19 RX ORDER — OXYCODONE HYDROCHLORIDE 5 MG/1
5 TABLET ORAL ONCE
Refills: 0 | Status: DISCONTINUED | OUTPATIENT
Start: 2023-09-19 | End: 2023-09-19

## 2023-09-19 RX ORDER — OXYCODONE HYDROCHLORIDE 5 MG/1
5 TABLET ORAL ONCE
Refills: 0 | Status: DISCONTINUED | OUTPATIENT
Start: 2023-09-19 | End: 2023-09-20

## 2023-09-19 RX ADMIN — Medication 81 MILLIGRAM(S): at 12:03

## 2023-09-19 RX ADMIN — POLYETHYLENE GLYCOL 3350 17 GRAM(S): 17 POWDER, FOR SOLUTION ORAL at 06:18

## 2023-09-19 RX ADMIN — Medication 650 MILLIGRAM(S): at 12:37

## 2023-09-19 RX ADMIN — OXYCODONE HYDROCHLORIDE 60 MILLIGRAM(S): 5 TABLET ORAL at 18:00

## 2023-09-19 RX ADMIN — OXYCODONE HYDROCHLORIDE 5 MILLIGRAM(S): 5 TABLET ORAL at 10:00

## 2023-09-19 RX ADMIN — BUDESONIDE AND FORMOTEROL FUMARATE DIHYDRATE 2 PUFF(S): 160; 4.5 AEROSOL RESPIRATORY (INHALATION) at 17:24

## 2023-09-19 RX ADMIN — POLYETHYLENE GLYCOL 3350 17 GRAM(S): 17 POWDER, FOR SOLUTION ORAL at 17:21

## 2023-09-19 RX ADMIN — ATORVASTATIN CALCIUM 80 MILLIGRAM(S): 80 TABLET, FILM COATED ORAL at 23:50

## 2023-09-19 RX ADMIN — Medication 650 MILLIGRAM(S): at 17:23

## 2023-09-19 RX ADMIN — Medication 1 APPLICATION(S): at 17:37

## 2023-09-19 RX ADMIN — Medication 40 MILLIGRAM(S): at 06:19

## 2023-09-19 RX ADMIN — ONDANSETRON 4 MILLIGRAM(S): 8 TABLET, FILM COATED ORAL at 18:47

## 2023-09-19 RX ADMIN — BUDESONIDE AND FORMOTEROL FUMARATE DIHYDRATE 2 PUFF(S): 160; 4.5 AEROSOL RESPIRATORY (INHALATION) at 06:20

## 2023-09-19 RX ADMIN — APIXABAN 5 MILLIGRAM(S): 2.5 TABLET, FILM COATED ORAL at 17:23

## 2023-09-19 RX ADMIN — LORATADINE 10 MILLIGRAM(S): 10 TABLET ORAL at 12:03

## 2023-09-19 RX ADMIN — Medication 650 MILLIGRAM(S): at 12:07

## 2023-09-19 RX ADMIN — Medication 1 DROP(S): at 14:34

## 2023-09-19 RX ADMIN — OXYCODONE HYDROCHLORIDE 5 MILLIGRAM(S): 5 TABLET ORAL at 09:30

## 2023-09-19 RX ADMIN — OXYCODONE HYDROCHLORIDE 60 MILLIGRAM(S): 5 TABLET ORAL at 06:20

## 2023-09-19 RX ADMIN — OXYCODONE HYDROCHLORIDE 60 MILLIGRAM(S): 5 TABLET ORAL at 17:21

## 2023-09-19 RX ADMIN — APIXABAN 5 MILLIGRAM(S): 2.5 TABLET, FILM COATED ORAL at 06:19

## 2023-09-19 RX ADMIN — Medication 650 MILLIGRAM(S): at 18:00

## 2023-09-19 RX ADMIN — SENNA PLUS 2 TABLET(S): 8.6 TABLET ORAL at 23:50

## 2023-09-19 RX ADMIN — Medication 1 APPLICATION(S): at 06:52

## 2023-09-19 RX ADMIN — Medication 1 APPLICATION(S): at 12:09

## 2023-09-19 NOTE — PATIENT PROFILE ADULT - FALL HARM RISK - HARM RISK INTERVENTIONS

## 2023-09-19 NOTE — PATIENT PROFILE ADULT - FUNCTIONAL ASSESSMENT - DAILY ACTIVITY 4.
Verified Results  TISSUE BIOPSY 2017 12:01AM ALAN THOMPSON   [2017 9:45PM ALAN THOMPSON]  Please inform pt that skin biopsy shows rash is likely allergic or contact dermatitis. No evidence of soriasis. Recommend pt stop all current moisturizers, neosporin, soaps, lotions, Gold Bond, shaving creams that she may be using, even if she has used them for years.Recommend she use arvind a non-fragranced hypoallergenic lotion on her legs and complete an oral course of prednisone. Rx in chart. Take in am with food. Pt not to take anti-inflammatories while on prednisone. Recommend rechek in the office 1 week after prednisone completed if rash persists. Pt should take a picture of the rash if it persists after prednisone is completed so that we can see the severity of it immediately after treatment. If rash resolves, no follow up needed.     Test Name Result Flag Reference   SURGICAL PATHOLOGY (Report) O    Name: THIAGO VENTURA       MRN:   [id number]   /Age:1964 (Age: 52)      Visit#: [id number]   Sex:F             Surgical Pathology Report      Client:  [facility]      Submitting Physician: Alan Thompson MD      Date Specimen Collected: 17      Accession #: [id number]   Date Specimen Received: 17      Requisition   #:[id number]   Date Reported:      2017 19:23  Location:   [facility]      ______________________________________________________________________________   Pathologic Diagnosis :   Right anterior tibial region - 2 mm punch biopsy:    - Superficial perivascular inflammation with epidermal spongiosis and few   eosinophils, most consistent with an eczematous process.      Diagnosis Comment:   The differential diagnosis includes an allergic, contact, and nummular   dermatitis as well as a drug reaction. The deeper levels revealed a small   focal granuloma. PAS, GMS, AFB/Melvin stains were all negative for   microorganisms. Iron stain did not  reveal dermal hemosiderin. Polarizable   foreign material was not identified. Clinical correlation is recommended. If   the lesion persists, re-biopsy is suggested.       Nery Diaz MD   ** Electronic Signature (SPA) 6/27/2017 19:23 **   ______________________________________________________________________________      Clinical Information:   Anterior tibial skin rash - persistent pruritic skin rash refractory to   steroids and topical anti-fungal      Specimen(s) Submitted:    Right anterior tibial region - 2 mm punch biopsy      Gross Description:   A: The specimen is received in formalin labeled right anterior tibial region   and consists of a skin punch biopsy measuring 0.2 cm cut to a depth of 0.2 cm.    The skin surface is pale tan and granular. The surgical margin is inked   black. Sections submitted:      A1: entire specimen wrapped in lens paper      WMT 6/21/2017 02:37 PM             Microscopic Description:      The attending pathologist whose signature appears on this report has reviewed   the diagnostic studies and has edited the gross and/or microscopic portion of   the report rendering the final diagnosis.      The immunohistochemical, FISH, or IZABELA reagents (if any) utilized in this test   were developed and their performance characteristics determined by [facility]. Some of the immunohistochemical reagents have not been cleared   or approved by the U.S. Food and Drug Administration. The FDA has determined   that such clearance or approval is not necessary. This test is used for   clinical purposes. It should not be regarded as investigational or for   research. This laboratory is certified under the Clinical Laboratory   Improvement Amendments of 1988 (CLIA) as qualified to perform high complexity   clinical laboratory testing. The appropriate controls were run and show   appropriate reactivity. Since FISH and/or immunohistochemistry for estrogen   receptor, progesterone receptor, and  HER2/liyah have not been validated on   decalcified tissue, such results should be interpreted with caution given the   likelihood of false negativity.      Fee Codes:    A: [id number], [id number]-QF, T-88635-WS, P-38916-SU, T-95923-GX, P-39060-QE,   T-59713-GI    P-81799-FW, [id number]-OX, T-53694-NG      Performing Lab Location (Unless otherwise specified):   [address] [address]        2 = A lot of assistance

## 2023-09-19 NOTE — PHYSICAL THERAPY INITIAL EVALUATION ADULT - ORIENTATION, REHAB EVAL
04/07/21 1130   Wound Foot Anterior; Left #1 left dorsal foot 04/07/21   Date First Assessed/Time First Assessed: 04/07/21 1130   Present on Hospital Admission: Yes  Location: Foot  Wound Location Orientation: Anterior; Left  Wound Description: #1 left dorsal foot  Date of First Observation: 04/07/21   Wound Image    Wound Etiology Arterial   Dressing Status Clean;Dry; Intact   Cleansed Soap and water   Wound Length (cm) 3 cm   Wound Width (cm) 6 cm   Wound Depth (cm) 0.1 cm   Wound Surface Area (cm^2) 18 cm^2   Wound Volume (cm^3) 1.8 cm^3   Wound Assessment Slough   Drainage Amount Moderate   Drainage Description Serous   Wound Odor None   Hayde-Wound/Incision Assessment Edematous; Blanchable erythema   Wound Thickness Description Full thickness   Wound Foot Anterior;Right #2 right dorsal foot to include toes 04/07/21   Date First Assessed/Time First Assessed: 04/07/21 1130   Present on Hospital Admission: Yes  Wound Approximate Age at First Assessment (Weeks): 52 weeks  Primary Wound Type: Arterial Ulcer  Location: Foot  Wound Location Orientation: Anterior;Right  W... Wound Image    Wound Etiology Arterial   Dressing Status Clean;Dry; Intact   Cleansed Soap and water   Dressing/Treatment   (silvadene cream, telfa gauze)   Wound Length (cm) 2 cm   Wound Width (cm) 1.5 cm   Wound Depth (cm) 0.1 cm   Wound Surface Area (cm^2) 3 cm^2   Wound Volume (cm^3) 0.3 cm^3   Drainage Amount Moderate   Drainage Description Serous   Wound Odor None   Hayde-Wound/Incision Assessment Edematous; Blanchable erythema   Wound Thickness Description Full thickness   Wound Foot Anterior;Right #3 right lateral foot/heel 04/07/21   Date First Assessed/Time First Assessed: 04/07/21 1130   Present on Hospital Admission: Yes  Wound Approximate Age at First Assessment (Weeks): 52 weeks  Primary Wound Type: Arterial Ulcer  Location: Foot  Wound Location Orientation: Anterior;Right  W...    Wound Image    Wound Etiology Arterial   Dressing Status Clean;Dry; Intact   Cleansed Soap and water   Dressing/Treatment   (silvadene, telfa, gauze)   Wound Length (cm) 5 cm   Wound Width (cm) 1.5 cm   Wound Depth (cm) 0.1 cm   Wound Surface Area (cm^2) 7.5 cm^2   Wound Volume (cm^3) 0.75 cm^3   Wound Assessment Granulation tissue; Epithelialization   Drainage Amount Moderate   Drainage Description Serous   Wound Odor None   Hayde-Wound/Incision Assessment Edematous; Blanchable erythema   Wound Thickness Description Full thickness oriented to person, place, time and situation

## 2023-09-19 NOTE — DISCHARGE NOTE NURSING/CASE MANAGEMENT/SOCIAL WORK - NSDCPETBCESMAN_GEN_ALL_CORE
1. Small linear avulsion type fracture along the dorsal aspect of the navicular only seen on the   lateral view.  There is mild overlying soft tissue swelling.  This is age indeterminate as this   does not appear to correspond to the site of pain.  2. No evidence of fracture of the medial or lateral malleolus.  Normal alignment at the ankle   mortise.  bh xrays   If you are a smoker, it is important for your health to stop smoking. Please be aware that second hand smoke is also harmful.

## 2023-09-19 NOTE — CONSULT NOTE ADULT - SUBJECTIVE AND OBJECTIVE BOX
Wound SURGERY CONSULT NOTE    HPI:  Pt is a 67yo F w/ PMH of HTN, HLD, DM2, HFpEF, CKD III, DVT, Uterine CA and recent hospitalization at [facility] from 08/30-09/08 for dyspnea thought likely 2/2 PE and severe pulm HTN, now on Eliquis, pw rectal pain.    Since discharge from [facility] she reports decreased oral intake and nausea w/ no BM in the past 12 days. Still passing gas. Over the past 24 hours she reports rectal pain which occurs even with small movement. She otherwise denies any bleeding, no F/C, or urinary symptoms.     She did report some abd pain and CP in the ED though on encounter reports improvement of that pain in the lower quadrants as well as the atypical CP which she states she gets intermittently.    In the ED VSS w/ labs notable for hypoglycemia to 47, Cr of 1.93 and Trop 83- 78 and CXR w/ mild congestion and CT A/P w/out acute findings. She was administered D50 and started on D10/NS as well as administered Morphine 4mg x2.   (18 Sep 2023 02:00)        N/V/D,  BM/ Flatus,   NGT,     palp/ sob/dyspnea/ cp,       F/C/S  Wound consult requested by team to assist w/ management of      wound/ pressure injury.   Pt (unable to)  c/o pain, drainage, odor, color change,  or worsening swelling. Offloading and pericare initiated upon admission as pt Increasingly sedentary 2/2 to illness. Pt is Incontinent of urine & stool. (+)de la torre/ ostomy.   No h/o bites, scratches, falls, trauma.  Pt seen by Wound RN  [facility] [employer]/  Alessandro,TRIAD/ Aquacell/ medihoney/ Allevyn foam/ dakins/ Adaptic/ DSD recommended used at home/ while awaiting consult.  Appetite good/ decreased.  weight loss.  S&S / RD consult appreciated All questions asked and answered to pt's and family's expressed understanding and satisfaction.    Current Diet: Diet, DASH/TLC:   Sodium & Cholesterol Restricted (09-18-23 @ 14:34)      PAST MEDICAL & SURGICAL HISTORY:  Diabetes Mellitus Type II      HTN (Hypertension)      Endometrial Hyperplasia      Cervical Stenosis of Spine      Spinal Stenosis, Lumbar      Deep Vein Thrombosis (DVT)  Left leg, 2004, treated and resolved    Dyslipidemia    Cataract    Morbid Obesity    Vitamin D deficiency    Insomnia    CKD (chronic kidney disease)    Congestive heart failure    Uterine cancer    Asthma    On home oxygen therapy    Gait difficulty    s/p Rt Cataract extracted with lens implant    s/p C Section     s/p Cholecystectomy/appendectomy     s/p D&C   hysteroscopy, endometrial hyperplasia,    s/p Cervical Spinal Stenosis surgery x2     s/p Tonsillectomy as a child    s/p Endometrial biopsy    s/p laser iridotomy    s/p colonoscopy    S/P total abdominal hysterectomy and bilateral salpingo-oophorectomy    S/P appendectomy    S/P cholecystectomy        REVIEW OF SYSTEMS: Pt unable to offer  General/ Breast/ Skin/Vasc/ Neuro/ MSK: see HPI  All other systems negative    MEDICATIONS  (STANDING):  acetaminophen Tablet 650 milliGRAM(s) Oral every 6 hours  apixaban 5 milliGRAM(s) Oral every 12 hours  aspirin enteric coated 81 milliGRAM(s) Oral daily  atorvastatin 80 milliGRAM(s) Oral at bedtime  budesonide 160 MICROgram(s)/formoterol 4.5 MICROgram(s) Inhaler 2 Puff(s) Inhalation two times a day  cadexomer iodine 0.9% Gel 1 Application(s) Topical daily  dextrose 5%. 1000 milliLiter(s) (100 mL/Hr) IV Continuous <Continuous>  dextrose 5%. 1000 milliLiter(s) (50 mL/Hr) IV Continuous <Continuous>  dextrose 50% Injectable 25 Gram(s) IV Push once  dextrose 50% Injectable 25 Gram(s) IV Push once  dextrose 50% Injectable 12.5 Gram(s) IV Push once  furosemide    Tablet 40 milliGRAM(s) Oral daily  glucagon  Injectable 1 milliGRAM(s) IntraMuscular once  loratadine 10 milliGRAM(s) Oral daily  nitroglycerin  0.2% Ointment Compound 1 Application(s) Rectal two times a day  oxyCODONE  ER Tablet 60 milliGRAM(s) Oral every 12 hours  polyethylene glycol 3350 17 Gram(s) Oral two times a day  senna 2 Tablet(s) Oral at bedtime    MEDICATIONS  (PRN):  albuterol 90 MICROgram(s) HFA Inhaler 2 Puff(s) Inhalation every 6 hours PRN Shortness of Breath and/or Wheezing  artificial tears (preservative free) Ophthalmic Solution 1 Drop(s) Both EYES four times a day PRN Dry Eyes  bisacodyl Suppository 10 milliGRAM(s) Rectal daily PRN Constipation  dextrose Oral Gel 15 Gram(s) Oral once PRN Blood Glucose LESS THAN 70 milliGRAM(s)/deciliter      Allergies  wool- rash, itch (Other)  latex (Rash)  adhesives (Rash)  Bactrim (Flushing)    SOCIAL HISTORY:      FAMILY HISTORY: pancreatic cancer, bladder cancer, lung cancer (Grandparent)    no h/o PVD or wound healing or skin problems    PHYSICAL EXAM:  Vital Signs Last 24 Hrs  T(C): 36.8 (19 Sep 2023 05:31), Max: 36.9 (18 Sep 2023 19:50)  T(F): 98.3 (19 Sep 2023 05:31), Max: 98.4 (18 Sep 2023 19:50)  HR: 69 (19 Sep 2023 05:31) (69 - 79)  BP: 107/52 (19 Sep 2023 05:31) (102/53 - 133/73)  BP(mean): --  RR: 18 (19 Sep 2023 05:31) (18 - 18)  SpO2: 93% (19 Sep 2023 05:31) (93% - 96%)    Parameters below as of 19 Sep 2023 05:31  Patient On (Oxygen Delivery Method): nasal cannula  O2 Flow (L/min): 4      NAD,         LABS/ CULTURES/ RADIOLOGY:                        12.0   9.53  )-----------( 199      ( 18 Sep 2023 07:27 )             40.3       141  |  98  |  42  ----------------------------<  127      [09-18-23 @ 07:27]  3.9   |  29  |  1.87        Ca     9.5     [09-18-23 @ 07:27]      Mg     2.3     [09-18-23 @ 07:27]      Phos  3.5     [09-18-23 @ 07:27]      A1C with Estimated Average Glucose Result: 7.2 % (09-17-23 @ 16:13)  A1C with Estimated Average Glucose Result: 6.5 % (08-31-23 @ 10:25)                Waffle Cushion to chair when oob to chair  Continue w/ low air loss pressure redistribution bed surface   Pt will need Group 2 mattress on hospital bed and ROHO cushion for wheel chair upon discharge home  Care as per medicine, will follow w/ you  Upon discharge f/u as outpatient at [facility] [address] [facility] [phone]  D/w team & RN & attng  Thank you for this consult  Etelvina Osborne PA-C CWS [id number]  Nights/ Weekends/ Holidays please call:  General Surgery Consult pager [phone]) for emergencies  Wound PT for multilayer leg wrapping or VAC issues (x [phone])      Wound SURGERY CONSULT NOTE    HPI:  Pt is a 69yo F w/ PMH of HTN, HLD, DM2, HFpEF, CKD III, DVT, Uterine CA and recent hospitalization at [facility] from 08/30-09/08 for dyspnea thought likely 2/2 PE and severe pulm HTN, now on Eliquis, pw rectal pain.    Since discharge from [facility] she reports decreased oral intake and nausea w/ no BM in the past 12 days. Still passing gas. Over the past 24 hours she reports rectal pain which occurs even with small movement. She otherwise denies any bleeding, no F/C, or urinary symptoms.     She did report some abd pain and CP in the ED though on encounter reports improvement of that pain in the lower quadrants as well as the atypical CP which she states she gets intermittently.    In the ED VSS w/ labs notable for hypoglycemia to 47, Cr of 1.93 and Trop 83- 78 and CXR w/ mild congestion and CT A/P w/out acute findings. She was administered D50 and started on D10/NS as well as administered Morphine 4mg x2.    Wound consult requested by team to assist w/ management of  BLE  wounds and swelling.   Pt w/o  c/o pain, drainage, odor, color change.  Pt doesn't wear compression. legs heavy making walking difficult. Offloading and pericare initiated upon admission as pt  sedentary 2/2 to illness.   No h/o bites, scratches, falls, trauma.   Appetite good w/o  weight loss.  All questions asked and answered to pt's and family's expressed understanding and satisfaction.    Current Diet: Diet, DASH/TLC:   Sodium & Cholesterol Restricted (09-18-23 @ 14:34)      PAST MEDICAL & SURGICAL HISTORY:  Diabetes Mellitus Type II    HTN (Hypertension)    Endometrial Hyperplasia, Uterine cancer  s/p Endometrial biopsy  S/P total abdominal hysterectomy and bilateral salpingo-oophorectomy    Deep Vein Thrombosis (DVT)  Left leg, 2004, treated and resolved    Dyslipidemia    Morbid Obesity    Vitamin D deficiency    Insomnia    CKD (chronic kidney disease) III    Congestive heart failure, HFpEF    Asthma    On home oxygen therapy    s/p Rt eye Cataract extracted with lens implant     s/p C Section 1994    s/p Cholecystectomy/appendectomy    s/p D&C x2    Cervical Stenosis of Spine  Spinal Stenosis, Lumbar  Cervical Spinal Stenosis surgery x2 (01/2002, 7/2002)    s/p Tonsillectomy    s/p laser iridotomy      REVIEW OF SYSTEMS: General/ Skin/ MSK/ VASC: see HPI  All other systems negative        MEDICATIONS  (STANDING):  acetaminophen Tablet 650 milliGRAM(s) Oral every 6 hours  apixaban 5 milliGRAM(s) Oral every 12 hours  aspirin enteric coated 81 milliGRAM(s) Oral daily  atorvastatin 80 milliGRAM(s) Oral at bedtime  budesonide 160 MICROgram(s)/formoterol 4.5 MICROgram(s) Inhaler 2 Puff(s) Inhalation two times a day  cadexomer iodine 0.9% Gel 1 Application(s) Topical daily  dextrose 5%. 1000 milliLiter(s) (100 mL/Hr) IV Continuous <Continuous>  dextrose 5%. 1000 milliLiter(s) (50 mL/Hr) IV Continuous <Continuous>  dextrose 50% Injectable 25 Gram(s) IV Push once  dextrose 50% Injectable 25 Gram(s) IV Push once  dextrose 50% Injectable 12.5 Gram(s) IV Push once  furosemide    Tablet 40 milliGRAM(s) Oral daily  glucagon  Injectable 1 milliGRAM(s) IntraMuscular once  loratadine 10 milliGRAM(s) Oral daily  nitroglycerin  0.2% Ointment Compound 1 Application(s) Rectal two times a day  oxyCODONE  ER Tablet 60 milliGRAM(s) Oral every 12 hours  polyethylene glycol 3350 17 Gram(s) Oral two times a day  senna 2 Tablet(s) Oral at bedtime    MEDICATIONS  (PRN):  albuterol 90 MICROgram(s) HFA Inhaler 2 Puff(s) Inhalation every 6 hours PRN Shortness of Breath and/or Wheezing  artificial tears (preservative free) Ophthalmic Solution 1 Drop(s) Both EYES four times a day PRN Dry Eyes  bisacodyl Suppository 10 milliGRAM(s) Rectal daily PRN Constipation  dextrose Oral Gel 15 Gram(s) Oral once PRN Blood Glucose LESS THAN 70 milliGRAM(s)/deciliter      Allergies  wool- rash, itch (Other)  latex (Rash)  adhesives (Rash)  Bactrim (Flushing)    SOCIAL HISTORY:      FAMILY HISTORY: pancreatic cancer, bladder cancer, lung cancer (Grandparent)    no h/o PVD or wound healing or skin problems    PHYSICAL EXAM:  Vital Signs Last 24 Hrs  T(C): 36.8 (19 Sep 2023 05:31), Max: 36.9 (18 Sep 2023 19:50)  T(F): 98.3 (19 Sep 2023 05:31), Max: 98.4 (18 Sep 2023 19:50)  HR: 69 (19 Sep 2023 05:31) (69 - 79)  BP: 107/52 (19 Sep 2023 05:31) (102/53 - 133/73)  BP(mean): --  RR: 18 (19 Sep 2023 05:31) (18 - 18)  SpO2: 93% (19 Sep 2023 05:31) (93% - 96%)    Parameters below as of 19 Sep 2023 05:31  Patient On (Oxygen Delivery Method): nasal cannula  O2 Flow (L/min): 4      NAD A&Ox3  MO  Versa Care P500 bed  HEENT:  NC/AT, EOMI, sclera clear, mucosa moist, throat clear, trachea midline, neck supple  Respiratory: nonlabored w/ equal chest rise  Gastrointestinal soft NT/ND   : (+)purewick  Neurology: strength & sensation grossly intact  Psych: calm/ appropriate  Musculoskeletal:   FROM, no deformities/ contractures  Vascular: BLE equally warm,  no cyanosis, clubbing,        BLE edema equal, BLE hemosiderin staining  BLE w/ deep red plaque in distinct pattern suggestive of fungal component      several small superficial wounds w/o drainage        Rt foot Charcot deformity, Lateral plantar foot w/ dry callous            Rt 2nd toe dry scab and 3rd toe intact blister             no palp DP/PT pulses palpable        Lt (+)DP pulse palpable, Lt 2&3rd toes w/scabs and 5th toe w/ dry ulcer              no discrete lesion w/ drainage        no acute ischemia noted  No odor, erythema, increased warmth, tenderness, induration, fluctuance, nor crepitus    Skin:  dry w/ good turgor  Abdominal and Groin Pannus skin folds w/ moist skin w/ erythema suggestive of fungal rash     no open skin or blistering or discrete area of drainage  No odor, erythema, increased warmth, tenderness, induration, fluctuance, nor crepitus    LABS/ CULTURES/ RADIOLOGY:                        12.0   9.53  )-----------( 199      ( 18 Sep 2023 07:27 )             40.3       141  |  98  |  42  ----------------------------<  127      [09-18-23 @ 07:27]  3.9   |  29  |  1.87        Ca     9.5     [09-18-23 @ 07:27]      Mg     2.3     [09-18-23 @ 07:27]      Phos  3.5     [09-18-23 @ 07:27]      A1C with Estimated Average Glucose Result: 7.2 % (09-17-23 @ 16:13)  A1C with Estimated Average Glucose Result: 6.5 % (08-31-23 @ 10:25)

## 2023-09-19 NOTE — PATIENT PROFILE ADULT - PUBLIC BENEFITS
Head injury    Tylenol 1000 mg every 8 hours for headache    Antibiotic is Augmentin, take 1 twice daily for 5 days to prevent infection    Try to leave the sterile bandage on until tomorrow morning and then replaced bandage daily, or more often if visibly soiled    If at any time you noticed altered mental status or something just seems to be different with him, please take him to the ER          no

## 2023-09-19 NOTE — PROGRESS NOTE ADULT - PROBLEM SELECTOR PLAN 2
On insulin at home, taking daily but has not been eating appropriately the past two weeks   On D10+NS for now, will continue   Monitor FS q4hrs  Patient with sugars improved after fluids  Will monitor intake  Will consider Endocrine consult if continues to have poor PO intake and needs change to home insulin dosage.

## 2023-09-19 NOTE — PROGRESS NOTE ADULT - SUBJECTIVE AND OBJECTIVE BOX
[facility] Division of Hospital Medicine  Andrea Munoz DO  Pager (M-F, 8A-5P):  MS Teams PREFERRED        SUBJECTIVE / OVERNIGHT EVENTS:  Patient seen at the bedside, explained long hospital course at [facility], states has not had BM in 2 weeks, saw pain management doctor previously who started regiment. Denies chest pain, shortness of breath.       MEDICATIONS  (STANDING):  acetaminophen     Tablet .. 650 milliGRAM(s) Oral every 6 hours  apixaban 5 milliGRAM(s) Oral every 12 hours  aspirin enteric coated 81 milliGRAM(s) Oral daily  atorvastatin 80 milliGRAM(s) Oral at bedtime  budesonide 160 MICROgram(s)/formoterol 4.5 MICROgram(s) Inhaler 2 Puff(s) Inhalation two times a day  cadexomer iodine 0.9% Gel 1 Application(s) Topical daily  dextrose 5%. 1000 milliLiter(s) (100 mL/Hr) IV Continuous <Continuous>  dextrose 5%. 1000 milliLiter(s) (50 mL/Hr) IV Continuous <Continuous>  dextrose 50% Injectable 25 Gram(s) IV Push once  dextrose 50% Injectable 12.5 Gram(s) IV Push once  dextrose 50% Injectable 25 Gram(s) IV Push once  furosemide    Tablet 40 milliGRAM(s) Oral daily  glucagon  Injectable 1 milliGRAM(s) IntraMuscular once  loratadine 10 milliGRAM(s) Oral daily  nitroglycerin  0.2% Ointment Compound 1 Application(s) Rectal two times a day  oxyCODONE  ER Tablet 60 milliGRAM(s) Oral every 12 hours  polyethylene glycol 3350 17 Gram(s) Oral two times a day  senna 2 Tablet(s) Oral at bedtime    MEDICATIONS  (PRN):  albuterol    90 MICROgram(s) HFA Inhaler 2 Puff(s) Inhalation every 6 hours PRN Shortness of Breath and/or Wheezing  artificial tears (preservative free) Ophthalmic Solution 1 Drop(s) Both EYES four times a day PRN Dry Eyes  bisacodyl Suppository 10 milliGRAM(s) Rectal daily PRN Constipation  dextrose Oral Gel 15 Gram(s) Oral once PRN Blood Glucose LESS THAN 70 milliGRAM(s)/deciliter      I&O's Summary    18 Sep 2023 07:01  -  19 Sep 2023 07:00  --------------------------------------------------------  IN: 0 mL / OUT: 300 mL / NET: -300 mL        PHYSICAL EXAM:  Vital Signs Last 24 Hrs  T(C): 36.8 (19 Sep 2023 05:31), Max: 36.9 (18 Sep 2023 19:50)  T(F): 98.3 (19 Sep 2023 05:31), Max: 98.4 (18 Sep 2023 19:50)  HR: 69 (19 Sep 2023 05:31) (69 - 79)  BP: 107/52 (19 Sep 2023 05:31) (102/53 - 133/73)  BP(mean): --  RR: 18 (19 Sep 2023 05:31) (18 - 18)  SpO2: 93% (19 Sep 2023 05:31) (93% - 96%)    Parameters below as of 19 Sep 2023 05:31  Patient On (Oxygen Delivery Method): nasal cannula  O2 Flow (L/min): 4    CONSTITUTIONAL: Well-groomed, in no apparent distress  EYES: No conjunctival or scleral injection, non-icteric;   ENMT: No external nasal lesions; MMM  NECK: Trachea midline without palpable neck mass; thyroid not enlarged and non-tender  RESPIRATORY: Breathing comfortably; no dullness to percussion; lungs CTA without wheeze/rhonchi/rales though limited d/t body habitus  CARDIOVASCULAR: Difficult examination d/t body habitus. +S1S2, RRR, no M/G/R; pedal pulses full and symmetric; no lower extremity edema  GASTROINTESTINAL: Surgical scars present. No palpable masses or tenderness, +BS throughout, no rebound/guarding; no hepatosplenomegaly; no hernia palpated  LYMPHATIC: No cervical LAD or tenderness  SKIN: Stasis dermatitis in LE. No rashes or ulcers noted  NEUROLOGIC: CN II-XII intact; sensation intact in LEs b/l to light touch  PSYCHIATRIC: A&Ox3; mood and affect appropriate; appropriate insight and judgment    LABS:                        12.0   9.53  )-----------( 199      ( 18 Sep 2023 07:27 )             40.3     09-18    141  |  98  |  42<H>  ----------------------------<  127<H>  3.9   |  29  |  1.87<H>    Ca    9.5      18 Sep 2023 07:27  Phos  3.5     09-18  Mg     2.3     09-18    TPro  7.1  /  Alb  4.0  /  TBili  1.0  /  DBili  x   /  AST  38  /  ALT  31  /  AlkPhos  228<H>  09-17    PT/INR - ( 17 Sep 2023 16:14 )   PT: 15.9 sec;   INR: 1.46 ratio         PTT - ( 17 Sep 2023 16:14 )  PTT:33.6 sec      Urinalysis Basic - ( 18 Sep 2023 07:27 )    Color: x / Appearance: x / SG: x / pH: x  Gluc: 127 mg/dL / Ketone: x  / Bili: x / Urobili: x   Blood: x / Protein: x / Nitrite: x   Leuk Esterase: x / RBC: x / WBC x   Sq Epi: x / Non Sq Epi: x / Bacteria: x          RADIOLOGY & ADDITIONAL TESTS:  Results Reviewed: CBC/CMP personally reviewed by me Hgb 12.0 WBC 9.53, Cr 1.87, Na 141, K 3.9   Imaging Personally Reviewed:  Electrocardiogram Personally Reviewed:    COORDINATION OF CARE:  Care Discussed with Consultants/Other Providers [Y/N]:Y   Prior or Outpatient Records Reviewed [Y/N]: Y

## 2023-09-19 NOTE — PHYSICAL THERAPY INITIAL EVALUATION ADULT - ADDITIONAL COMMENTS
Pt amditted from rehab where she required assist with all functional mobility and ADLs. Prior to intial admission pt required assist with functional mobility/ADLs however was able to ambulate short distances with RW. Pt lives with her 2 sons in a PH 7 steps to enter however with ramp access, her 1 son is primary caregiver her other son requires assist and has a HHA of his own.

## 2023-09-19 NOTE — PHYSICAL THERAPY INITIAL EVALUATION ADULT - PERTINENT HX OF CURRENT PROBLEM, REHAB EVAL
69yo F w/ PMH of HTN, HLD, DM2, HFpEF, CKD III, DVT, Uterine CA and recent hospitalization at [facility] from 08/30-09/08 for dyspnea thought likely 2/2 PE and severe pulm HTN, now on Eliquis, pw rectal pain

## 2023-09-19 NOTE — PATIENT PROFILE ADULT - FUNCTIONAL ASSESSMENT - DAILY ACTIVITY 2.
Physical Therapy Daily Treatment     Visit Count: 21 / 32  Plan of Care Dates: Initial: 8/4/2017 Through: 10/27/2017  Insurance Information: see below  Next Referring Provider Visit: 10/25/17  Referred by: Tad Otoole MD  Medical Diagnosis (from order):  S/P shoulder surgery [Z98.890]  - Primary   Order details:  Type 3 RCR protocol      Treatment Diagnosis: Shoulder Symptoms with Pain, Impaired Range of Motion and Impaired Motor Function/Muscle Performance  Insurance: 1. ANTHEM/BCBS  2. N/A  HARD Visit Limit: 50 visits per calendar year            - Combined: Yes (PT/OT/ST)            - Used: 18  Auth Required: No     Ded: $2600 (FAM) - $0 Remaining  Pays at: 80%  OOP: $6850 (FAM) - $2037.46 Remaining     Date of Onset: 6/20/17  Left shoulder arthroscopy  PROCEDURE PERFORMED:  1.  Massive rotator cuff repair with 3 triple loaded PEEK Anchors backup PushLock type fixation.  2.  Extensive debridement of glenohumeral joint, including debridement of superior labrum.  3.  Subacromial decompression.     Diagnosis Precautions: Type 3 protocol  Chart reviewed: Relevant co-morbidities, allergies, tests and medications: diabetes    SUBJECTIVE   Kirk said his shoulder is feeling better this week. He is able to lift overhead easier which he wasn't able to do last week.  Functional Change: Sleeping is still somewhat difficult if he lays on that arm  OBJECTIVE     Range of Motion (degrees) Norm Left Left Left Left Left Left Left              Date   Initial  8/4 8/22/17 8/29/17 9/5/17  PROM 9/12/17 9/26/17 AROM 10/5/17 AROM   Flexion 170-180 120 (max per protocol) 145* 155* 155 P: 155  A: 135 140 162   Abduction 170-180 45 (max per protocol) 150* 160* 170 P: 170  A: 130 130 161   Internal Rotation   45 (max per protocol) 80 85 80 P: 90  A: to SI To SI 85   External  Rotation 80-90 35 (allowed 45 per protocol) 50* 53* 65 P: 62  A: 48 40 65   standard testing positions unless otherwise noted; Key: ranges are reported in  active range of motion unless noted as AA=active assistive or P=passive range of motion, * denotes pain      Palpation:  The deltoid area that was an area of hard muscle feels a lot softer now.    Treatment   Therapeutic Exercise:   Scifit arm bike level 2: 2 min forward/2 min back  PROM left shoulder: flexion, abduction, IR/ER    Circuit:  Row machine 20# x 10 thumbs up, x 10 thumbs in -*increase weight next session  Lat pull down machine 20# x 10 narrow handles, x 10 wide handles  Chest press 20# 2 x 10    GH adduction towel stretch behind back x 10  GH IR towel stretch behind back x 10    Cybex cables:  Tricep pull downs x 15 with 3 bars  Forward punches 2 bars x 10 each way up, down, forward  Lawnmower pulls x 10 each way up, down, back with 2 bars  Standing flys x 10 with 2 bars  GH IR 2 bars 2 x 10    Ball pass behind back x 10 each way with red ball     Home Exercise Program:  Exercise: Date issued Date DC Comments   Pendulums, table slides,  8/4     Seated ER with wand 8/9     Wall slides: flexion, scaption 8/22  D/C: flexion   Wall slides: half [place] 8/29     Supine AROM flexion; Sidelying AROM abduction 9/5     Sidelying ER, supine PNF D1 & D2 9/7     LV 1 TBand shldr ext, rowing, punching, IR/ER 9/12         ASSESSMENT   Kirk was able to perform more reps with weight machines today. He still is limited with reaching behind his back. Overhead reaching is improving.   Result of above outlined education: Verbalizes understanding and Demonstrates understanding    Goals:  To be obtained by end of this plan of care:  1. Patient independent with modified and progressed home exercise program.  2. Patient will increase involved shoulder active range of motion to internal rotation 70 deg to low back, external rotation 75°, abduction/flexion 110° to aid in reaching, putting on clothes, and doing ADL's.   3. Patient will increase involved shoulder strength to 4+/5 to aid in normalization of upper extremity movements  to aid activities of independent daily living.  4. Patient will be able to sleep 6 hours without disruption from pain.     PLAN   Continue per Type 3 (massive) RCR protocol     Surgery: 6/20/17  8 weeks: 8/15/17  10 weeks: 8/29/17  12 weeks: 9/12/17  14 weeks: 9/26/17    THERAPY DAILY BILLING   Primary Insurance: [employer]  Secondary Insurance: N/A    Evaluation Procedures:  No evaluation codes were used on this date of service    Timed Procedures:  Therapeutic Exercise, 40 minutes    Untimed Procedures:  No untimed codes were used on this date of service    Total Treatment Time: 40 minutes        2 = A lot of assistance

## 2023-09-19 NOTE — PROGRESS NOTE ADULT - ASSESSMENT
67yo F w/ PMH of HTN, HLD, DM2, HFpEF, CKD III, DVT, Uterine CA and recent hospitalization at [facility] from 08/30-09/08 for dyspnea thought likely 2/2 PE and severe pulm HTN, now on Eliquis, pw rectal pain, had bowel movements today, will continue to monitor, if patient pain persists may need Surg evaluation for thrombosed hemorrhoids.

## 2023-09-20 ENCOUNTER — TRANSCRIPTION ENCOUNTER (OUTPATIENT)
Age: 68
End: 2023-09-20

## 2023-09-20 LAB
ANION GAP SERPL CALC-SCNC: 12 MMOL/L — SIGNIFICANT CHANGE UP (ref 5–17)
BUN SERPL-MCNC: 46 MG/DL — HIGH (ref 7–23)
CALCIUM SERPL-MCNC: 9.7 MG/DL — SIGNIFICANT CHANGE UP (ref 8.4–10.5)
CHLORIDE SERPL-SCNC: 95 MMOL/L — LOW (ref 96–108)
CO2 SERPL-SCNC: 31 MMOL/L — SIGNIFICANT CHANGE UP (ref 22–31)
CREAT SERPL-MCNC: 2.08 MG/DL — HIGH (ref 0.5–1.3)
CULTURE RESULTS: SIGNIFICANT CHANGE UP
EGFR: 25 ML/MIN/1.73M2 — LOW
GLUCOSE BLDC GLUCOMTR-MCNC: 198 MG/DL — HIGH (ref 70–99)
GLUCOSE BLDC GLUCOMTR-MCNC: 282 MG/DL — HIGH (ref 70–99)
GLUCOSE BLDC GLUCOMTR-MCNC: 328 MG/DL — HIGH (ref 70–99)
GLUCOSE BLDC GLUCOMTR-MCNC: 352 MG/DL — HIGH (ref 70–99)
GLUCOSE SERPL-MCNC: 323 MG/DL — HIGH (ref 70–99)
POTASSIUM SERPL-MCNC: 4.9 MMOL/L — SIGNIFICANT CHANGE UP (ref 3.5–5.3)
POTASSIUM SERPL-SCNC: 4.9 MMOL/L — SIGNIFICANT CHANGE UP (ref 3.5–5.3)
SODIUM SERPL-SCNC: 138 MMOL/L — SIGNIFICANT CHANGE UP (ref 135–145)
SPECIMEN SOURCE: SIGNIFICANT CHANGE UP

## 2023-09-20 PROCEDURE — 99223 1ST HOSP IP/OBS HIGH 75: CPT

## 2023-09-20 PROCEDURE — 99233 SBSQ HOSP IP/OBS HIGH 50: CPT

## 2023-09-20 RX ORDER — OXYCODONE HYDROCHLORIDE 5 MG/1
5 TABLET ORAL ONCE
Refills: 0 | Status: DISCONTINUED | OUTPATIENT
Start: 2023-09-20 | End: 2023-09-20

## 2023-09-20 RX ORDER — ASCORBIC ACID 60 MG
500 TABLET,CHEWABLE ORAL DAILY
Refills: 0 | Status: DISCONTINUED | OUTPATIENT
Start: 2023-09-20 | End: 2023-09-29

## 2023-09-20 RX ORDER — HYDROMORPHONE HYDROCHLORIDE 2 MG/ML
2 INJECTION INTRAMUSCULAR; INTRAVENOUS; SUBCUTANEOUS ONCE
Refills: 0 | Status: DISCONTINUED | OUTPATIENT
Start: 2023-09-20 | End: 2023-09-20

## 2023-09-20 RX ORDER — INSULIN LISPRO 100/ML
VIAL (ML) SUBCUTANEOUS
Refills: 0 | Status: DISCONTINUED | OUTPATIENT
Start: 2023-09-20 | End: 2023-09-22

## 2023-09-20 RX ORDER — PSYLLIUM SEED (WITH DEXTROSE)
1 POWDER (GRAM) ORAL DAILY
Refills: 0 | Status: DISCONTINUED | OUTPATIENT
Start: 2023-09-20 | End: 2023-09-29

## 2023-09-20 RX ORDER — INSULIN LISPRO 100/ML
VIAL (ML) SUBCUTANEOUS AT BEDTIME
Refills: 0 | Status: DISCONTINUED | OUTPATIENT
Start: 2023-09-20 | End: 2023-09-23

## 2023-09-20 RX ADMIN — POLYETHYLENE GLYCOL 3350 17 GRAM(S): 17 POWDER, FOR SOLUTION ORAL at 17:15

## 2023-09-20 RX ADMIN — BUDESONIDE AND FORMOTEROL FUMARATE DIHYDRATE 2 PUFF(S): 160; 4.5 AEROSOL RESPIRATORY (INHALATION) at 06:40

## 2023-09-20 RX ADMIN — OXYCODONE HYDROCHLORIDE 5 MILLIGRAM(S): 5 TABLET ORAL at 22:00

## 2023-09-20 RX ADMIN — Medication 650 MILLIGRAM(S): at 23:19

## 2023-09-20 RX ADMIN — Medication 4: at 17:13

## 2023-09-20 RX ADMIN — LORATADINE 10 MILLIGRAM(S): 10 TABLET ORAL at 11:10

## 2023-09-20 RX ADMIN — Medication 650 MILLIGRAM(S): at 11:10

## 2023-09-20 RX ADMIN — OXYCODONE HYDROCHLORIDE 5 MILLIGRAM(S): 5 TABLET ORAL at 21:02

## 2023-09-20 RX ADMIN — APIXABAN 5 MILLIGRAM(S): 2.5 TABLET, FILM COATED ORAL at 06:35

## 2023-09-20 RX ADMIN — Medication 1 APPLICATION(S): at 06:40

## 2023-09-20 RX ADMIN — Medication 3: at 22:16

## 2023-09-20 RX ADMIN — OXYCODONE HYDROCHLORIDE 60 MILLIGRAM(S): 5 TABLET ORAL at 17:44

## 2023-09-20 RX ADMIN — OXYCODONE HYDROCHLORIDE 60 MILLIGRAM(S): 5 TABLET ORAL at 17:14

## 2023-09-20 RX ADMIN — Medication 650 MILLIGRAM(S): at 17:44

## 2023-09-20 RX ADMIN — ATORVASTATIN CALCIUM 80 MILLIGRAM(S): 80 TABLET, FILM COATED ORAL at 22:17

## 2023-09-20 RX ADMIN — Medication 650 MILLIGRAM(S): at 17:14

## 2023-09-20 RX ADMIN — OXYCODONE HYDROCHLORIDE 60 MILLIGRAM(S): 5 TABLET ORAL at 06:35

## 2023-09-20 RX ADMIN — Medication 40 MILLIGRAM(S): at 06:35

## 2023-09-20 RX ADMIN — Medication 81 MILLIGRAM(S): at 11:09

## 2023-09-20 RX ADMIN — Medication 650 MILLIGRAM(S): at 11:40

## 2023-09-20 RX ADMIN — Medication 650 MILLIGRAM(S): at 06:40

## 2023-09-20 RX ADMIN — Medication 1 APPLICATION(S): at 17:15

## 2023-09-20 RX ADMIN — Medication 650 MILLIGRAM(S): at 06:36

## 2023-09-20 RX ADMIN — HYDROMORPHONE HYDROCHLORIDE 2 MILLIGRAM(S): 2 INJECTION INTRAMUSCULAR; INTRAVENOUS; SUBCUTANEOUS at 11:10

## 2023-09-20 RX ADMIN — BUDESONIDE AND FORMOTEROL FUMARATE DIHYDRATE 2 PUFF(S): 160; 4.5 AEROSOL RESPIRATORY (INHALATION) at 17:14

## 2023-09-20 RX ADMIN — HYDROMORPHONE HYDROCHLORIDE 2 MILLIGRAM(S): 2 INJECTION INTRAMUSCULAR; INTRAVENOUS; SUBCUTANEOUS at 11:41

## 2023-09-20 RX ADMIN — APIXABAN 5 MILLIGRAM(S): 2.5 TABLET, FILM COATED ORAL at 17:13

## 2023-09-20 RX ADMIN — OXYCODONE HYDROCHLORIDE 5 MILLIGRAM(S): 5 TABLET ORAL at 02:48

## 2023-09-20 RX ADMIN — SENNA PLUS 2 TABLET(S): 8.6 TABLET ORAL at 22:17

## 2023-09-20 NOTE — DISCHARGE NOTE NURSING/CASE MANAGEMENT/SOCIAL WORK - NSSCCARECORD_GEN_ALL_CORE
[facility]/Durable Medical Equipment Agency/Community [facility]

## 2023-09-20 NOTE — DISCHARGE NOTE NURSING/CASE MANAGEMENT/SOCIAL WORK - NSDCPEFALRISK_GEN_ALL_CORE
For information on Fall & Injury Prevention, visit: [email or web address] OR  [email or web address] OR  https://www.cdc.gov/steadi/patient.html

## 2023-09-20 NOTE — DISCHARGE NOTE PROVIDER - NSDCFUADDAPPT_GEN_ALL_CORE_FT
Podiatry Discharge Instructions:  Follow up: Please follow up with Dr. Riley within 1 week of discharge from the hospital, please call [phone] for appointment and discuss that you recently were seen in the hospital.  Wound Care: Please apply mupirocin to left heel wound followed by 4x4 gauze, ABD pad, yasir, and Ace bandage.   Weight bearing: Please weight bear as tolerated in a surgical shoe.  Antibiotics: Please continue as instructed. Podiatry Discharge Instructions:  Follow up: Please follow up with Dr. Riley within 1 week of discharge from the hospital, please call [phone] for appointment and discuss that you recently were seen in the hospital.  Wound Care: Please apply Iodosorb to left heel wound followed by 4x4 gauze, ABD pad, yasir, and Ace bandage, three times a week   Weight bearing: Please weight bear as tolerated in a surgical shoe bilaterally.   Antibiotics: Please continue as instructed. APPTS ARE READY TO BE MADE: [ ] YES    Best Family or Patient Contact (if needed):    Additional Information about above appointments (if needed):    1: Podiatry  2: PCP  3: Colorectal Surgery    Other comments or requests:       Podiatry Discharge Instructions:  Follow up: Please follow up with Dr. Riley within 1 week of discharge from the hospital, please call [phone] for appointment and discuss that you recently were seen in the hospital.  Wound Care: Please apply Iodosorb to left heel wound followed by 4x4 gauze, ABD pad, yasir, and Ace bandage, three times a week   Weight bearing: Please weight bear as tolerated in a surgical shoe bilaterally.   Antibiotics: Please continue as instructed.

## 2023-09-20 NOTE — DISCHARGE NOTE PROVIDER - NSDCCPCAREPLAN_GEN_ALL_CORE_FT
PRINCIPAL DISCHARGE DIAGNOSIS  Diagnosis: Rectal pain  Assessment and Plan of Treatment: Seen by colorectal surgery and you have a possible anal fissure   -Recommend Metamucil and/or Colace to avoid hard stools   -Increase fiber intake   -Sitz baths (plain warm water) at least four times a day and after every bowel movement for 15-20min each. Avoid toilet paper after a bowel movement, use soap and water or unscented wipes.  -Recommend topical Nifedipine ointment   -Outpatient follow up with Dr. Holman         SECONDARY DISCHARGE DIAGNOSES  Diagnosis: Type 2 diabetes mellitus with hypoglycemia  Assessment and Plan of Treatment: Make sure you get your HgA1c checked every three months.  If you take oral diabetes medications, check your blood glucose two times a day.  If you take insulin, check your blood glucose before meals and at bedtime.  It's important not to skip any meals.  Keep a log of your blood glucose results and always take it with you to your doctor appointments.  Keep a list of your current medications including injectables and over the counter medications and bring this medication list with you to all your doctor appointments.  If you have not seen your ophthalmologist this year call for appointment.  Check your feet daily for redness, sores, or openings. Do not self treat. If no improvement in two days call your primary care physician for an appointment.  Low blood sugar (hypoglycemia) is a blood sugar below 70mg/dl. Check your blood sugar if you feel signs/symptoms of hypoglycemia. If your blood sugar is below 70 take 15 grams of carbohydrates (ex 4 oz of apple juice, 3-4 glucose tablets, or 4-6 oz of regular soda) wait 15 minutes and repeat blood sugar to make sure it comes up above 70.  If your blood sugar is above 70 and you are due for a meal, have a meal.  If you are not due for a meal have a snack.  This snack helps keeps your blood sugar at a safe range.      Diagnosis: Suspected pulmonary embolism  Assessment and Plan of Treatment: Continue Eliquis  Eliquis is used to thin the blood so clots will not form and to keep existing ones from getting bigger.  Take this medication daily as prescribed by your health care provider.  Take this medication with food to prevent upset stomach.  If you miss a dose call your health care provider or pharmacist right away.  Tell your doctor you use this drug before you have a spinal or epidural procedure  Tell dentists, surgeon, and other doctors that you use this drug.  You may bleed more easily.  Be careful and avoid injury.  Use a soft toothbrush and an electric razor.      Diagnosis: Acute UTI  Assessment and Plan of Treatment: Treated with antibiotics    Diagnosis: Adrenal nodule  Assessment and Plan of Treatment: Follow up outpatient

## 2023-09-20 NOTE — DIETITIAN INITIAL EVALUATION ADULT - PERTINENT MEDS FT
MEDICATIONS  (STANDING):  acetaminophen     Tablet .. 650 milliGRAM(s) Oral every 6 hours  apixaban 5 milliGRAM(s) Oral every 12 hours  aspirin enteric coated 81 milliGRAM(s) Oral daily  atorvastatin 80 milliGRAM(s) Oral at bedtime  budesonide 160 MICROgram(s)/formoterol 4.5 MICROgram(s) Inhaler 2 Puff(s) Inhalation two times a day  cadexomer iodine 0.9% Gel 1 Application(s) Topical daily  dextrose 5%. 1000 milliLiter(s) (50 mL/Hr) IV Continuous <Continuous>  dextrose 5%. 1000 milliLiter(s) (100 mL/Hr) IV Continuous <Continuous>  dextrose 50% Injectable 12.5 Gram(s) IV Push once  dextrose 50% Injectable 25 Gram(s) IV Push once  dextrose 50% Injectable 25 Gram(s) IV Push once  furosemide    Tablet 40 milliGRAM(s) Oral daily  glucagon  Injectable 1 milliGRAM(s) IntraMuscular once  loratadine 10 milliGRAM(s) Oral daily  nitroglycerin  0.2% Ointment Compound 1 Application(s) Rectal two times a day  oxyCODONE  ER Tablet 60 milliGRAM(s) Oral every 12 hours  polyethylene glycol 3350 17 Gram(s) Oral two times a day  senna 2 Tablet(s) Oral at bedtime    MEDICATIONS  (PRN):  albuterol    90 MICROgram(s) HFA Inhaler 2 Puff(s) Inhalation every 6 hours PRN Shortness of Breath and/or Wheezing  artificial tears (preservative free) Ophthalmic Solution 1 Drop(s) Both EYES four times a day PRN Dry Eyes  bisacodyl Suppository 10 milliGRAM(s) Rectal daily PRN Constipation  dextrose Oral Gel 15 Gram(s) Oral once PRN Blood Glucose LESS THAN 70 milliGRAM(s)/deciliter  ondansetron Injectable 4 milliGRAM(s) IV Push every 8 hours PRN Nausea and/or Vomiting

## 2023-09-20 NOTE — PROGRESS NOTE ADULT - SUBJECTIVE AND OBJECTIVE BOX
Patient is a 68y old  Female who presents with a chief complaint of Rectal Pain (20 Sep 2023 15:26)      HPI:  Pt is a 67yo F w/ PMH of HTN, HLD, DM2, HFpEF, CKD III, DVT, Uterine CA and recent hospitalization at [facility] from 08/30-09/08 for dyspnea thought likely 2/2 PE and severe pulm HTN, now on Eliquis, pw rectal pain.    Since discharge from [facility] she reports decreased oral intake and nausea w/ no BM in the past 12 days. Still passing gas. Over the past 24 hours she reports rectal pain which occurs even with small movement. She otherwise denies any bleeding, no F/C, or urinary symptoms.     She did report some abd pain and CP in the ED though on encounter reports improvement of that pain in the lower quadrants as well as the atypical CP which she states she gets intermittently.    In the ED VSS w/ labs notable for hypoglycemia to 47, Cr of 1.93 and Trop 83- 78 and CXR w/ mild congestion and CT A/P w/out acute findings. She was administered D50 and started on D10/NS as well as administered Morphine 4mg x2.   (18 Sep 2023 02:00)      PAST MEDICAL & SURGICAL HISTORY:  Diabetes Mellitus Type II      HTN (Hypertension)      Endometrial Hyperplasia      Cervical Stenosis of Spine      Spinal Stenosis, Lumbar      Deep Vein Thrombosis (DVT)  Left leg, 2004, treated and resolved      Dyslipidemia      Cataract      Morbid Obesity      Vitamin D deficiency      Insomnia      CKD (chronic kidney disease)  ~ III      Congestive heart failure  ~ HFpEF      Uterine cancer      Asthma      On home oxygen therapy      Gait difficulty  ~ u ses walker      Cataract extracted with lens implant 1998  Right      C Section 1994      Cholecystectomy/appendectomy @ age 25      D&C x2 1980's      D&C 2008  hysteroscopy, endometrial hyperplasia, 2009      Cervical Spinal Stenosis surgery x2 (01/2002, 7/2002)      Tonsillectomy as a child      Endometrial biopsy 12/02/09      H/O laser iridotomy  left eye, 2016      H/O colonoscopy  1998      S/P total abdominal hysterectomy and bilateral salpingo-oophorectomy      S/P appendectomy      S/P cholecystectomy          MEDICATIONS  (STANDING):  acetaminophen     Tablet .. 650 milliGRAM(s) Oral every 6 hours  apixaban 5 milliGRAM(s) Oral every 12 hours  ascorbic acid 500 milliGRAM(s) Oral daily  aspirin enteric coated 81 milliGRAM(s) Oral daily  atorvastatin 80 milliGRAM(s) Oral at bedtime  budesonide 160 MICROgram(s)/formoterol 4.5 MICROgram(s) Inhaler 2 Puff(s) Inhalation two times a day  cadexomer iodine 0.9% Gel 1 Application(s) Topical daily  dextrose 5%. 1000 milliLiter(s) (50 mL/Hr) IV Continuous <Continuous>  dextrose 5%. 1000 milliLiter(s) (100 mL/Hr) IV Continuous <Continuous>  dextrose 50% Injectable 12.5 Gram(s) IV Push once  dextrose 50% Injectable 25 Gram(s) IV Push once  dextrose 50% Injectable 25 Gram(s) IV Push once  furosemide    Tablet 40 milliGRAM(s) Oral daily  glucagon  Injectable 1 milliGRAM(s) IntraMuscular once  insulin lispro (ADMELOG) corrective regimen sliding scale   SubCutaneous three times a day before meals  loratadine 10 milliGRAM(s) Oral daily  Nephro-dipesh 1 Tablet(s) Oral daily  nitroglycerin  0.2% Ointment Compound 1 Application(s) Rectal two times a day  oxyCODONE  ER Tablet 60 milliGRAM(s) Oral every 12 hours  polyethylene glycol 3350 17 Gram(s) Oral two times a day  senna 2 Tablet(s) Oral at bedtime    MEDICATIONS  (PRN):  albuterol    90 MICROgram(s) HFA Inhaler 2 Puff(s) Inhalation every 6 hours PRN Shortness of Breath and/or Wheezing  artificial tears (preservative free) Ophthalmic Solution 1 Drop(s) Both EYES four times a day PRN Dry Eyes  bisacodyl Suppository 10 milliGRAM(s) Rectal daily PRN Constipation  dextrose Oral Gel 15 Gram(s) Oral once PRN Blood Glucose LESS THAN 70 milliGRAM(s)/deciliter  ondansetron Injectable 4 milliGRAM(s) IV Push every 8 hours PRN Nausea and/or Vomiting  psyllium Powder 1 Packet(s) Oral daily PRN constipation      Allergies    wool- rash, itch (Other)  latex (Rash)  adhesives (Rash)  Bactrim (Flushing)    Intolerances        VITALS:    Vital Signs Last 24 Hrs  T(C): 36.8 (20 Sep 2023 11:02), Max: 37 (19 Sep 2023 21:44)  T(F): 98.2 (20 Sep 2023 11:02), Max: 98.6 (19 Sep 2023 21:44)  HR: 67 (20 Sep 2023 11:02) (64 - 70)  BP: 109/57 (20 Sep 2023 11:02) (109/57 - 135/71)  BP(mean): --  RR: 18 (20 Sep 2023 11:02) (18 - 18)  SpO2: 96% (20 Sep 2023 11:02) (95% - 96%)    Parameters below as of 20 Sep 2023 11:02  Patient On (Oxygen Delivery Method): nasal cannula  O2 Flow (L/min): 4      LABS:        09-20    138  |  95<L>  |  46<H>  ----------------------------<  323<H>  4.9   |  31  |  2.08<H>    Ca    9.7      20 Sep 2023 11:38        CAPILLARY BLOOD GLUCOSE      POCT Blood Glucose.: 328 mg/dL (20 Sep 2023 16:39)  POCT Blood Glucose.: 282 mg/dL (20 Sep 2023 12:03)  POCT Blood Glucose.: 198 mg/dL (20 Sep 2023 07:59)  POCT Blood Glucose.: 254 mg/dL (19 Sep 2023 22:29)          LOWER EXTREMITY PHYSICAL EXAM:    Vascular: DP/PT 0/4, B/L, CFT <3 seconds B/L, Temperature gradient warm to cool, B/L.   Neuro: Epicritic sensation diminished to the level of digits, B/L.  Musculoskeletal/Ortho: right charcot midfoot collapse  Skin: bilateral anterior leg venous stasis ulcers, left plantar posterior heel wound to dermis, no drainage, no malodor, no signs of infection. Left plantar posterior deep tissue injury, no signs of infection. Left foot lateral border well- adhered eschar at level of MPJ.       RADIOLOGY & ADDITIONAL STUDIES:

## 2023-09-20 NOTE — PROGRESS NOTE ADULT - ASSESSMENT
68F presents with left foot wound   - patient seen and evaluated  - afebrile, no leucocytosis   - bilateral anterior leg venous stasis ulcers, left plantar posterior heel wound to dermis, no drainage, no malodor, no signs of infection. Left plantar posterior deep tissue injury, no signs of infection. Left foot lateral border well- adhered eschar at level of MPJ.   - recommend bilateral foot xrays   - recommend application of mupirocin to left heel wound followed by 4x4 gauze, ABD pad, katherine, ACE bandage.   - recommend application of betadine to left heel deep tissue injury followed by 4x4 gauze, ABD pad, Katherine ACE bandage.   - no acute pod intervention   - pod plan local wound care  - wound care instruction and follow up information provided in discharge note provider   - discussed with attending

## 2023-09-20 NOTE — DISCHARGE NOTE NURSING/CASE MANAGEMENT/SOCIAL WORK - PATIENT PORTAL LINK FT
You can access the FollowMyHealth Patient Portal offered by [facility] by registering at the following website: [email or web address]. By joining [facility]’s FollowMyHealth portal, you will also be able to view your health information using other applications (apps) compatible with our system.

## 2023-09-20 NOTE — DISCHARGE NOTE NURSING/CASE MANAGEMENT/SOCIAL WORK - NSDCVIVACCINE_GEN_ALL_CORE_FT
COVID-19 vaccine, vector-nr, [id number], PF, 0.5 mL (Omar); 16-Mar-2021 13:03; Kimberly Maya (RN); [facility]; [id number] (Exp. Date: 26-May-2021); IntraMuscular; Deltoid Left.; 0.5 milliLiter(s);   COVID-19, mRNA, LNP-S, PF, 100 mcg/ 0.5 mL dose (Moderna); 28-Jan-2022 10:09; Senia Juan (RN); [employer] [id number] (Exp. Date: 14-Apr-2022); IntraMuscular; Deltoid Left.; 0.25 milliLiter(s);   influenza, injectable, quadrivalent, preservative free; 09-Nov-2018 13:40; Mane Camejo (RN); [employer]; [id number] (Exp. Date: 16-Jun-2019); IntraMuscular; Deltoid Left.; 0.5 milliLiter(s); VIS (VIS Published: 07-Aug-2015, VIS Presented: 09-Nov-2018);   influenza, injectable, quadrivalent, preservative free; 15-Nov-2020 12:04; Ramiro Alexander (RN); [employer]; [id number] (Exp. Date: 30-Jun-2021); IntraMuscular; Deltoid Left.; 0.5 milliLiter(s); VIS (VIS Published: 15-Aug-2019, VIS Presented: 15-Nov-2020);   influenza, high-dose, quadrivalent; 28-Jan-2022 11:42; Senia Juan (RN); [employer]; [id number] (Exp. Date: 30-Jun-2022); IntraMuscular; Deltoid Right.; 0.7 milliLiter(s); VIS (VIS Published: 06-Aug-2021, VIS Presented: 28-Jan-2022);   Tdap; 24-Jan-2022 08:46; Lisa Yanes (RN); [employer]; [id number] (Exp. Date: 09-Sep-2023); IntraMuscular; Deltoid Left.; 0.5 milliLiter(s); VIS (VIS Published: 09-May-2013, VIS Presented: 24-Jan-2022);   Tdap; 30-Aug-2023 17:21; Edwige Reyes (RN); [employer]; [id number] (Exp. Date: 03-Oct-2025); IntraMuscular; Deltoid Left.; 0.5 milliLiter(s); VIS (VIS Published: 09-May-2013, VIS Presented: 30-Aug-2023);

## 2023-09-20 NOTE — DIETITIAN INITIAL EVALUATION ADULT - ORAL INTAKE PTA/DIET HISTORY
coffee and orange juice for breakfast, turkey sandwich, cottage cheese, fruit, chicken for lunch. chicken for dinner.

## 2023-09-20 NOTE — DISCHARGE NOTE PROVIDER - NSDCMRMEDTOKEN_GEN_ALL_CORE_FT
ADVAIR -21 MCG INHALER: TAKE 2 PUFFS BY MOUTH TWICE A DAY  apixaban 5 mg oral tablet: 1 tab(s) orally every 12 hours  Aspirin Enteric Coated 81 mg oral delayed release tablet: 1 tab(s) orally once a day  cadexomer iodine 0.9% topical gel: 1 Apply topically to affected area once a day  cholecalciferol oral tablet: 2000 unit(s) orally once a day  furosemide 40 mg oral tablet: 1 tab(s) orally once a day  lisinopril 5 mg oral tablet: 1 tab(s) orally once a day  loratadine 10 mg oral tablet: 1 tab(s) orally once a day  NovoLOG FlexPen 100 units/mL injectable solution: 10 unit(s) injectable 3 times a day (before meals)  nystatin 100,000 units/g topical powder: 1 application topically 2 times a day  ocular lubricant ophthalmic solution: 1 drop(s) to each affected eye 2 times a day  OxyCONTIN 60 mg oral tablet, extended release: 1 tab(s) orally every 12 hours  polyethylene glycol 3350 oral powder for reconstitution: 17 gram(s) orally once a day  ProAir HFA 90 mcg/inh inhalation aerosol: 2 puff(s) inhaled every 6 hours, As Needed  rosuvastatin 20 mg oral tablet: 1 tab(s) orally once a day (at bedtime)  Toujeo SoloStar 300 units/mL subcutaneous solution: 60 unit(s) subcutaneous 2 times a day - once in AM and once at bedtime   ADVAIR -21 MCG INHALER: TAKE 2 PUFFS BY MOUTH TWICE A DAY  apixaban 5 mg oral tablet: 1 tab(s) orally every 12 hours  Aspirin Enteric Coated 81 mg oral delayed release tablet: 1 tab(s) orally once a day  cadexomer iodine 0.9% topical gel: 1 Apply topically to affected area once a day  cholecalciferol oral tablet: 2000 unit(s) orally once a day  furosemide 40 mg oral tablet: 1 tab(s) orally once a day  Toan Lift: use as directed  loratadine 10 mg oral tablet: 1 tab(s) orally once a day  NovoLOG FlexPen 100 units/mL injectable solution: 10 unit(s) injectable 3 times a day (before meals)  nystatin 100,000 units/g topical powder: 1 application topically 2 times a day  ocular lubricant ophthalmic solution: 1 drop(s) to each affected eye 2 times a day  OxyCONTIN 60 mg oral tablet, extended release: 1 tab(s) orally every 12 hours  polyethylene glycol 3350 oral powder for reconstitution: 17 gram(s) orally once a day  ProAir HFA 90 mcg/inh inhalation aerosol: 2 puff(s) inhaled every 6 hours, As Needed  rosuvastatin 20 mg oral tablet: 1 tab(s) orally once a day (at bedtime)  Semi-electric Bariatric Bed: use as directed  Toujeo SoloStar 300 units/mL subcutaneous solution: 60 unit(s) subcutaneous 2 times a day - once in AM and once at bedtime   ADVAIR -21 MCG INHALER: TAKE 2 PUFFS BY MOUTH TWICE A DAY  apixaban 5 mg oral tablet: 1 tab(s) orally every 12 hours  Aspirin Enteric Coated 81 mg oral delayed release tablet: 1 tab(s) orally once a day  cadexomer iodine 0.9% topical gel: 1 Apply topically to affected area once a day  cholecalciferol oral tablet: 2000 unit(s) orally once a day  furosemide 40 mg oral tablet: 1 tab(s) orally once a day  Toan Lift: use as directed  loratadine 10 mg oral tablet: 1 tab(s) orally once a day  NovoLOG FlexPen 100 units/mL injectable solution: 56 unit(s) injectable once a day 20 units for breakfast, 16 units for lunch, and 20 units for Dinner.  nystatin 100,000 units/g topical powder: 1 application topically 2 times a day  ocular lubricant ophthalmic solution: 1 drop(s) to each affected eye 2 times a day  oxyCODONE 5 mg oral tablet: 1 tab(s) orally every 6 hours as needed for Severe Pain (7 - 10) MDD: 4 tabs  oxyCODONE 60 mg oral tablet, extended release: 1 tab(s) orally every 12 hours  polyethylene glycol 3350 oral powder for reconstitution: 17 gram(s) orally once a day  ProAir HFA 90 mcg/inh inhalation aerosol: 2 puff(s) inhaled every 6 hours, As Needed  rosuvastatin 20 mg oral tablet: 1 tab(s) orally once a day (at bedtime)  Semi-electric Bariatric Bed: use as directed  Toujeo SoloStar 300 units/mL subcutaneous solution: 68 unit(s) subcutaneous once a day - 30once in AM and 38 units once at bedtime

## 2023-09-20 NOTE — PROGRESS NOTE ADULT - PROBLEM SELECTOR PLAN 2
On insulin at home, taking daily but has not been eating appropriately the past two weeks   On D10+NS for now, will continue   Monitor FS q4hrs  Patient with sugars improved after fluids  Will monitor intake  Patient with improved intake.   Consider Endocrine if PO intake remains depressed in setting of insulin usage.

## 2023-09-20 NOTE — DISCHARGE NOTE PROVIDER - PROVIDER TOKENS
PROVIDER:[TOKEN:[[id number]],FOLLOWUP:[1 week]],PROVIDER:[TOKEN:[[id number]],FOLLOWUP:[2 weeks]]

## 2023-09-20 NOTE — DISCHARGE NOTE PROVIDER - NSDCCPTREATMENT_GEN_ALL_CORE_FT
PRINCIPAL PROCEDURE  Procedure: CT abdomen and pelvis with IV and oral contrast  Findings and Treatment: INTERPRETATION:  CLINICAL INFORMATION: Abdominal pain, nausea,   constipation. Evaluate for acute process.  COMPARISON: CT abdomen and pelvis from 3/24/2007.  CONTRAST/COMPLICATIONS:  IV Contrast: NONE  Oral Contrast: Omnipaque 300  Complications: None reported at time of study completion  PROCEDURE:  CT of the Abdomen and Pelvis was performed.  Sagittal and coronal reformats were performed.  FINDINGS:  Limited evaluation secondary to part of the right abdomen out of field of   view.  LOWER CHEST: Bibasilar subsegmental atelectasis. Mitral annular   calcifications.  LIVER: Within normal limits.  BILE DUCTS: Normal caliber.  GALLBLADDER: Cholecystectomy.  SPLEEN: Within normal limits.  PANCREAS: Fatty atrophy.  ADRENALS: An indeterminate 3 x 2 cm right adrenal nodule, previously 2.6   x 2.1 cm on 3/24/2007.  KIDNEYS/URETERS: Within normal limits.  BLADDER: Within normal limits.  REPRODUCTIVE ORGANS: Hysterectomy.  BOWEL: No bowel obstruction. Appendectomy.  PERITONEUM: No ascites.  VESSELS: Atherosclerotic changes.  RETROPERITONEUM/LYMPH NODES: No lymphadenopathy.  ABDOMINAL WALL: Coarse calcifications in the bilateral gluteal soft   tissue.  BONES: Degenerative changes.  IMPRESSION:  Limited evaluation secondary to part of the right abdomen out of field of   view.  No acute intra-abdominal finding.  Indeterminate right adrenal nodule is unchanged in size.

## 2023-09-20 NOTE — CONSULT NOTE ADULT - SUBJECTIVE AND OBJECTIVE BOX
HISTORY OF PRESENT ILLNESS:  HPI: Pt is a 67yo F w/ PMH of HTN, HLD, DM2, HFpEF, CKD III, DVT, Uterine CA and recent hospitalization at [facility] from 08/30-09/08 for dyspnea thought likely 2/2 PE and severe pulm HTN, now on Eliquis who presents with a few days of rectal pain. Patient describes the pain and burning and knife like. Since discharge from [facility], patient reported no BM for 12 days. Last colonoscopy was 6 years ago, at that time patient was told she had hemorrhoids (likely internal). Colorectal surgery consulted for evaluation of rectal pain.     PAST MEDICAL HISTORY: Diabetes Mellitus Type II    HTN (Hypertension)    Endometrial Hyperplasia    Cervical Stenosis of Spine    Spinal Stenosis, Lumbar    Deep Vein Thrombosis (DVT)    Dyslipidemia    Cataract    Morbid Obesity    Vitamin D deficiency    Edema of lower extremity    Insomnia    CKD (chronic kidney disease)    Narrow angle glaucoma of left eye    Other fecal abnormalities    Congestive heart failure    Uterine cancer    Asthma    On home oxygen therapy    Gait difficulty        PAST SURGICAL HISTORY: Cataract extracted with lens implant 1998    C Section 1994    Cholecystectomy/appendectomy @ age 25    D&C x2 1980's    D&C 2008    Cervical Spinal Stenosis surgery x2 (01/2002, 7/2002)    Tonsillectomy as a child    Endometrial biopsy 12/02/09    H/O laser iridotomy    H/O colonoscopy    S/P total abdominal hysterectomy and bilateral salpingo-oophorectomy    S/P appendectomy    S/P cholecystectomy    FAMILY HISTORY: Family history of pancreatic cancer    Family history of bladder cancer    Family history of lung cancer (Grandparent)    ALLERGIES: wool- rash, itch (Other)  latex (Rash)  adhesives (Rash)  Bactrim (Flushing)      VITAL SIGNS:  T(C): 36.8 (20 Sep 2023 11:02), Max: 37 (19 Sep 2023 21:44)  T(F): 98.2 (20 Sep 2023 11:02), Max: 98.6 (19 Sep 2023 21:44)  HR: 67 (20 Sep 2023 11:02) (64 - 70)  BP: 109/57 (20 Sep 2023 11:02) (109/57 - 135/71)  BP(mean): --  ABP: --  ABP(mean): --  RR: 18 (20 Sep 2023 11:02) (18 - 18)  SpO2: 96% (20 Sep 2023 11:02) (95% - 96%)    O2 Parameters below as of 20 Sep 2023 11:02  Patient On (Oxygen Delivery Method): nasal cannula  O2 Flow (L/min): 4    PHYSICAL EXAM:  Gen: Obese habitus  Neuro: A&Ox3  Resp: Increased WOB on NC   Cardiac: Appears well perfused, extremities warm  Abd: Soft, nondistended  Rectal: Hard brown stool noted at rectal vault, no external hemorrhoids noted, increased sphincter tone     PATIENT CARE ACCESS DEVICES:  [ ] Peripheral IV  [ ] Central Venous Line	[ ] R	[ ] L	[ ] IJ	[ ] Fem	[ ] SC	Placed:   [ ] Arterial Line		[ ] R	[ ] L	[ ] Fem	[ ] Rad	[ ] Ax	Placed:   [ ] PICC:					[ ] Mediport  [ ] Urinary Catheter, Date Placed:   [x] Necessity of urinary, arterial, and venous catheters discussed    OTHER MEDICATIONS: artificial tears (preservative free) Ophthalmic Solution 1 Drop(s) Both EYES four times a day PRN  cadexomer iodine 0.9% Gel 1 Application(s) Topical daily      IMAGING STUDIES:

## 2023-09-20 NOTE — DISCHARGE NOTE PROVIDER - CARE PROVIDER_API CALL
Paula Mccann  Internal Medicine  [address], [address]  [place] [address]  Phone: [phone]  Fax: [phone]  Follow Up Time: 1 week    Pramod Holman  Colon/Rectal Surgery  [address], [address]  [place] [address]  Phone: [phone]  Fax: [phone]  Follow Up Time: 2 weeks

## 2023-09-20 NOTE — DIETITIAN INITIAL EVALUATION ADULT - PERTINENT LABORATORY DATA
POCT Blood Glucose.: 198 mg/dL (09-20-23 @ 07:59)  A1C with Estimated Average Glucose Result: 7.2 % (09-17-23 @ 16:13)  A1C with Estimated Average Glucose Result: 6.5 % (08-31-23 @ 10:25)  A1C with Estimated Average Glucose Result: 7.3 % (10-13-22 @ 11:47)

## 2023-09-20 NOTE — PROGRESS NOTE ADULT - SUBJECTIVE AND OBJECTIVE BOX
[facility] Division of Hospital Medicine  Andrea Munoz DO  Pager (M-F, 8A-5P):  MS Teams PREFERRED        SUBJECTIVE / OVERNIGHT EVENTS:  Patient seen at the bedside, states she continues to have pain,     MEDICATIONS  (STANDING):  acetaminophen     Tablet .. 650 milliGRAM(s) Oral every 6 hours  apixaban 5 milliGRAM(s) Oral every 12 hours  aspirin enteric coated 81 milliGRAM(s) Oral daily  atorvastatin 80 milliGRAM(s) Oral at bedtime  budesonide 160 MICROgram(s)/formoterol 4.5 MICROgram(s) Inhaler 2 Puff(s) Inhalation two times a day  cadexomer iodine 0.9% Gel 1 Application(s) Topical daily  dextrose 5%. 1000 milliLiter(s) (50 mL/Hr) IV Continuous <Continuous>  dextrose 5%. 1000 milliLiter(s) (100 mL/Hr) IV Continuous <Continuous>  dextrose 50% Injectable 12.5 Gram(s) IV Push once  dextrose 50% Injectable 25 Gram(s) IV Push once  dextrose 50% Injectable 25 Gram(s) IV Push once  furosemide    Tablet 40 milliGRAM(s) Oral daily  glucagon  Injectable 1 milliGRAM(s) IntraMuscular once  loratadine 10 milliGRAM(s) Oral daily  nitroglycerin  0.2% Ointment Compound 1 Application(s) Rectal two times a day  oxyCODONE  ER Tablet 60 milliGRAM(s) Oral every 12 hours  polyethylene glycol 3350 17 Gram(s) Oral two times a day  senna 2 Tablet(s) Oral at bedtime    MEDICATIONS  (PRN):  albuterol    90 MICROgram(s) HFA Inhaler 2 Puff(s) Inhalation every 6 hours PRN Shortness of Breath and/or Wheezing  artificial tears (preservative free) Ophthalmic Solution 1 Drop(s) Both EYES four times a day PRN Dry Eyes  bisacodyl Suppository 10 milliGRAM(s) Rectal daily PRN Constipation  dextrose Oral Gel 15 Gram(s) Oral once PRN Blood Glucose LESS THAN 70 milliGRAM(s)/deciliter  ondansetron Injectable 4 milliGRAM(s) IV Push every 8 hours PRN Nausea and/or Vomiting      I&O's Summary      PHYSICAL EXAM:  Vital Signs Last 24 Hrs  T(C): 36.8 (20 Sep 2023 11:02), Max: 37 (19 Sep 2023 21:44)  T(F): 98.2 (20 Sep 2023 11:02), Max: 98.6 (19 Sep 2023 21:44)  HR: 67 (20 Sep 2023 11:02) (64 - 70)  BP: 109/57 (20 Sep 2023 11:02) (109/57 - 135/71)  BP(mean): --  RR: 18 (20 Sep 2023 11:02) (18 - 18)  SpO2: 96% (20 Sep 2023 11:02) (95% - 96%)    Parameters below as of 20 Sep 2023 11:02  Patient On (Oxygen Delivery Method): nasal cannula  O2 Flow (L/min): 4    CONSTITUTIONAL: Well-groomed, in no apparent distress  EYES: No conjunctival or scleral injection, non-icteric;   ENMT: No external nasal lesions; MMM  NECK: Trachea midline without palpable neck mass; thyroid not enlarged and non-tender  RESPIRATORY: Breathing comfortably; no dullness to percussion; lungs CTA without wheeze/rhonchi/rales though limited d/t body habitus  CARDIOVASCULAR: Difficult examination d/t body habitus. +S1S2, RRR, no M/G/R; pedal pulses full and symmetric; no lower extremity edema  GASTROINTESTINAL: Surgical scars present. No palpable masses or tenderness, +BS throughout, no rebound/guarding; no hepatosplenomegaly; no hernia palpated  LYMPHATIC: No cervical LAD or tenderness  SKIN: Stasis dermatitis in LE. No rashes or ulcers noted  NEUROLOGIC: CN II-XII intact; sensation intact in LEs b/l to light touch  PSYCHIATRIC: A&Ox3; mood and affect appropriate; appropriate insight and judgment    LABS:                    Culture - Urine (collected 17 Sep 2023 18:08)  Source: Clean Catch Clean Catch (Midstream)  Final Report (20 Sep 2023 07:41):    <10,000 CFU/mL Normal Urogenital Brenda        RADIOLOGY & ADDITIONAL TESTS:  Results Reviewed:   Imaging Personally Reviewed:  Electrocardiogram Personally Reviewed:    COORDINATION OF CARE:  Care Discussed with Consultants/Other Providers [Y/N]: Y   Prior or Outpatient Records Reviewed [Y/N]:

## 2023-09-20 NOTE — DISCHARGE NOTE PROVIDER - HOSPITAL COURSE
HPI:  Pt is a 67yo F w/ PMH of HTN, HLD, DM2, HFpEF, CKD III, DVT, Uterine CA and recent hospitalization at [facility] from 08/30-09/08 for dyspnea thought likely 2/2 PE and severe pulm HTN, now on Eliquis, pw rectal pain.    Since discharge from [facility] she reports decreased oral intake and nausea w/ no BM in the past 12 days. Still passing gas. Over the past 24 hours she reports rectal pain which occurs even with small movement. She otherwise denies any bleeding, no F/C, or urinary symptoms.     She did report some abd pain and CP in the ED though on encounter reports improvement of that pain in the lower quadrants as well as the atypical CP which she states she gets intermittently.    In the ED VSS w/ labs notable for hypoglycemia to 47, Cr of 1.93 and Trop 83- 78 and CXR w/ mild congestion and CT A/P w/out acute findings. She was administered D50 and started on D10/NS as well as administered Morphine 4mg x2.   (18 Sep 2023 02:00)    Hospital Course:    Patient was diagnosed with rectal pain/ Abd pain (intermittent) and rectal pain w/out reported BM in 2 weeks c/w constipation on home opiods Stool on CT scan consistent for  constipation Rectal pain may also be d/t anal fissure vs thrombosed hemorrhoids, started on bowel regimen and nitroglycerin  ointment rectally BID for possible fissure vs thrombosed hemorrhoids On discharge will prescribe topical nifedipine. Colorectal surgery consulted & rec outpatient follow up  During her stay, she had an acute UTI treated with Ceftriaxone x 3 days (9/23--9/25)  For her  Type 2 diabetes mellitus with hypoglycemia, endocrine was consulted and will be discharged on basal bolus regimen.   She presented with [place] w/ elevated enzymes at [facility] on recent admission (8/30--9/13) w/ suspected PE at the time , negative DVT in duplex and discharged on Eliquis 5 BID which was continued.   For her hypertension, lasix was continued.   She will need outpatient follow up for the right adrenal nodule noted on CT .    Important Medication Changes and Reason:  On discharge will prescribe topical nifedipine for rectal pain/possible fissure  Rectal nitroglycerin for rectal pain   Basal bolus regimen as per endocrine for T2MD    Active or Pending Issues Requiring Follow-up:  PCP   Podiatry  Colorectal surgery    Advanced Directives:   [ X] Full code  [ ] DNR  [ ] Hospice    Discharge Diagnoses:  Rectal pain 2/2 constipation/fissure  Acute UTI   History of PE

## 2023-09-20 NOTE — DISCHARGE NOTE PROVIDER - DETAILS OF MALNUTRITION DIAGNOSIS/DIAGNOSES
This patient has been assessed with a concern for Malnutrition and was treated during this hospitalization for the following Nutrition diagnosis/diagnoses:     -  09/20/2023: Severe protein-calorie malnutrition   -  09/20/2023: Morbid obesity (BMI > 40)

## 2023-09-20 NOTE — PROGRESS NOTE ADULT - ASSESSMENT
67yo F w/ PMH of HTN, HLD, DM2, HFpEF, CKD III, DVT, Uterine CA and recent hospitalization at [facility] from 08/30-09/08 for dyspnea thought likely 2/2 PE and severe pulm HTN, now on Eliquis, pw rectal pain, had bowel movements today, will continue to monitor, if patient pain persists plan to consult surgery for evaluation/pain management.

## 2023-09-21 ENCOUNTER — NON-APPOINTMENT (OUTPATIENT)
Age: 68
End: 2023-09-21

## 2023-09-21 LAB
ANION GAP SERPL CALC-SCNC: 13 MMOL/L — SIGNIFICANT CHANGE UP (ref 5–17)
BUN SERPL-MCNC: 53 MG/DL — HIGH (ref 7–23)
CALCIUM SERPL-MCNC: 9.9 MG/DL — SIGNIFICANT CHANGE UP (ref 8.4–10.5)
CHLORIDE SERPL-SCNC: 96 MMOL/L — SIGNIFICANT CHANGE UP (ref 96–108)
CO2 SERPL-SCNC: 30 MMOL/L — SIGNIFICANT CHANGE UP (ref 22–31)
CREAT SERPL-MCNC: 1.92 MG/DL — HIGH (ref 0.5–1.3)
EGFR: 28 ML/MIN/1.73M2 — LOW
GLUCOSE BLDC GLUCOMTR-MCNC: 273 MG/DL — HIGH (ref 70–99)
GLUCOSE BLDC GLUCOMTR-MCNC: 358 MG/DL — HIGH (ref 70–99)
GLUCOSE BLDC GLUCOMTR-MCNC: 358 MG/DL — HIGH (ref 70–99)
GLUCOSE BLDC GLUCOMTR-MCNC: 384 MG/DL — HIGH (ref 70–99)
GLUCOSE SERPL-MCNC: 280 MG/DL — HIGH (ref 70–99)
POTASSIUM SERPL-MCNC: 4.8 MMOL/L — SIGNIFICANT CHANGE UP (ref 3.5–5.3)
POTASSIUM SERPL-SCNC: 4.8 MMOL/L — SIGNIFICANT CHANGE UP (ref 3.5–5.3)
SODIUM SERPL-SCNC: 139 MMOL/L — SIGNIFICANT CHANGE UP (ref 135–145)

## 2023-09-21 PROCEDURE — 99233 SBSQ HOSP IP/OBS HIGH 50: CPT

## 2023-09-21 RX ORDER — OXYCODONE HYDROCHLORIDE 5 MG/1
5 TABLET ORAL ONCE
Refills: 0 | Status: DISCONTINUED | OUTPATIENT
Start: 2023-09-21 | End: 2023-09-21

## 2023-09-21 RX ADMIN — ATORVASTATIN CALCIUM 80 MILLIGRAM(S): 80 TABLET, FILM COATED ORAL at 21:15

## 2023-09-21 RX ADMIN — Medication 650 MILLIGRAM(S): at 17:13

## 2023-09-21 RX ADMIN — SENNA PLUS 2 TABLET(S): 8.6 TABLET ORAL at 21:15

## 2023-09-21 RX ADMIN — OXYCODONE HYDROCHLORIDE 60 MILLIGRAM(S): 5 TABLET ORAL at 16:06

## 2023-09-21 RX ADMIN — Medication 650 MILLIGRAM(S): at 17:43

## 2023-09-21 RX ADMIN — APIXABAN 5 MILLIGRAM(S): 2.5 TABLET, FILM COATED ORAL at 05:18

## 2023-09-21 RX ADMIN — Medication 650 MILLIGRAM(S): at 05:18

## 2023-09-21 RX ADMIN — Medication 650 MILLIGRAM(S): at 06:18

## 2023-09-21 RX ADMIN — ONDANSETRON 4 MILLIGRAM(S): 8 TABLET, FILM COATED ORAL at 05:54

## 2023-09-21 RX ADMIN — Medication 3: at 09:13

## 2023-09-21 RX ADMIN — OXYCODONE HYDROCHLORIDE 60 MILLIGRAM(S): 5 TABLET ORAL at 06:18

## 2023-09-21 RX ADMIN — Medication 3: at 22:00

## 2023-09-21 RX ADMIN — Medication 500 MILLIGRAM(S): at 12:35

## 2023-09-21 RX ADMIN — OXYCODONE HYDROCHLORIDE 5 MILLIGRAM(S): 5 TABLET ORAL at 21:15

## 2023-09-21 RX ADMIN — Medication 1 APPLICATION(S): at 17:49

## 2023-09-21 RX ADMIN — Medication 650 MILLIGRAM(S): at 12:35

## 2023-09-21 RX ADMIN — Medication 1 TABLET(S): at 12:35

## 2023-09-21 RX ADMIN — OXYCODONE HYDROCHLORIDE 60 MILLIGRAM(S): 5 TABLET ORAL at 05:18

## 2023-09-21 RX ADMIN — Medication 81 MILLIGRAM(S): at 12:35

## 2023-09-21 RX ADMIN — APIXABAN 5 MILLIGRAM(S): 2.5 TABLET, FILM COATED ORAL at 17:13

## 2023-09-21 RX ADMIN — LORATADINE 10 MILLIGRAM(S): 10 TABLET ORAL at 12:35

## 2023-09-21 RX ADMIN — Medication 650 MILLIGRAM(S): at 13:25

## 2023-09-21 RX ADMIN — POLYETHYLENE GLYCOL 3350 17 GRAM(S): 17 POWDER, FOR SOLUTION ORAL at 05:20

## 2023-09-21 RX ADMIN — Medication 650 MILLIGRAM(S): at 00:19

## 2023-09-21 RX ADMIN — Medication 1 APPLICATION(S): at 12:00

## 2023-09-21 RX ADMIN — Medication 5: at 17:13

## 2023-09-21 RX ADMIN — Medication 5: at 12:35

## 2023-09-21 RX ADMIN — Medication 40 MILLIGRAM(S): at 05:18

## 2023-09-21 RX ADMIN — BUDESONIDE AND FORMOTEROL FUMARATE DIHYDRATE 2 PUFF(S): 160; 4.5 AEROSOL RESPIRATORY (INHALATION) at 17:16

## 2023-09-21 RX ADMIN — OXYCODONE HYDROCHLORIDE 60 MILLIGRAM(S): 5 TABLET ORAL at 16:36

## 2023-09-21 RX ADMIN — BUDESONIDE AND FORMOTEROL FUMARATE DIHYDRATE 2 PUFF(S): 160; 4.5 AEROSOL RESPIRATORY (INHALATION) at 05:19

## 2023-09-21 RX ADMIN — Medication 1 APPLICATION(S): at 05:19

## 2023-09-21 NOTE — PROGRESS NOTE ADULT - SUBJECTIVE AND OBJECTIVE BOX
Surgery Progress Note     Subjective:  Patient seen and examined on AM rounds      Vital Signs:  Vital Signs Last 24 Hrs  T(C): 36.3 (21 Sep 2023 04:50), Max: 36.4 (20 Sep 2023 20:43)  T(F): 97.4 (21 Sep 2023 04:50), Max: 97.5 (20 Sep 2023 20:43)  HR: 64 (21 Sep 2023 04:50) (64 - 69)  BP: 162/74 (21 Sep 2023 04:50) (129/65 - 162/74)  BP(mean): --  RR: 18 (21 Sep 2023 04:50) (18 - 18)  SpO2: 95% (21 Sep 2023 04:50) (95% - 98%)    Parameters below as of 21 Sep 2023 04:50  Patient On (Oxygen Delivery Method): nasal cannula  O2 Flow (L/min): 4      CAPILLARY BLOOD GLUCOSE      POCT Blood Glucose.: 273 mg/dL (21 Sep 2023 08:48)  POCT Blood Glucose.: 352 mg/dL (20 Sep 2023 21:59)  POCT Blood Glucose.: 328 mg/dL (20 Sep 2023 16:39)  POCT Blood Glucose.: 282 mg/dL (20 Sep 2023 12:03)      I&O's Detail    20 Sep 2023 07:01  -  21 Sep 2023 07:00  --------------------------------------------------------  IN:    Oral Fluid: 360 mL  Total IN: 360 mL    OUT:  Total OUT: 0 mL    Total NET: 360 mL            Physical Exam:  General: NAD, resting comfortably in bed  HEENT: Normocephalic atraumatic  Respiratory: Nonlabored respirations  Cardio: regular rate  Abdomen: soft, nondistended, nontender  Vascular: extremities are warm and well perfused      Labs:    09-21    139  |  96  |  53<H>  ----------------------------<  280<H>  4.8   |  30  |  1.92<H>    Ca    9.9      21 Sep 2023 06:45

## 2023-09-21 NOTE — PROGRESS NOTE ADULT - ASSESSMENT
68F presents with left foot wound   - patient seen and evaluated  - afebrile, vital signs stable   - bilateral anterior leg venous stasis ulcers, left plantar posterior heel wound to dermis, no drainage, no malodor, no signs of infection. Left plantar posterior deep tissue injury, no signs of infection. Left foot lateral border well- adhered eschar at level of MPJ, previous oupatient laceration well healed.   - Bilateral xray pending   - recommend application of Iodosorb to left heel wound followed by 4x4 gauze, ABD pad, yasir, ACE bandage. No less than three times a week.   - Edema control per Primary   - no acute pod intervention   - Podiatry stable for discharge, outpatient wound care recommendations, weight bearing status, and follow up info in Discharge provider note.   - Will follow periodically while in house  - Reconsult for acute foot condition sooner  - Seen with attending

## 2023-09-21 NOTE — PROGRESS NOTE ADULT - SUBJECTIVE AND OBJECTIVE BOX
[facility] Division of Hospital Medicine  Andrea Munoz DO  Pager (M-F, 8A-5P):  MS Teams PREFERRED        SUBJECTIVE / OVERNIGHT EVENTS:      MEDICATIONS  (STANDING):  acetaminophen     Tablet .. 650 milliGRAM(s) Oral every 6 hours  apixaban 5 milliGRAM(s) Oral every 12 hours  ascorbic acid 500 milliGRAM(s) Oral daily  aspirin enteric coated 81 milliGRAM(s) Oral daily  atorvastatin 80 milliGRAM(s) Oral at bedtime  budesonide 160 MICROgram(s)/formoterol 4.5 MICROgram(s) Inhaler 2 Puff(s) Inhalation two times a day  cadexomer iodine 0.9% Gel 1 Application(s) Topical daily  dextrose 5%. 1000 milliLiter(s) (50 mL/Hr) IV Continuous <Continuous>  dextrose 5%. 1000 milliLiter(s) (100 mL/Hr) IV Continuous <Continuous>  dextrose 50% Injectable 12.5 Gram(s) IV Push once  dextrose 50% Injectable 25 Gram(s) IV Push once  dextrose 50% Injectable 25 Gram(s) IV Push once  furosemide    Tablet 40 milliGRAM(s) Oral daily  glucagon  Injectable 1 milliGRAM(s) IntraMuscular once  insulin lispro (ADMELOG) corrective regimen sliding scale   SubCutaneous three times a day before meals  insulin lispro (ADMELOG) corrective regimen sliding scale   SubCutaneous at bedtime  loratadine 10 milliGRAM(s) Oral daily  Nephro-dipesh 1 Tablet(s) Oral daily  nitroglycerin  0.2% Ointment Compound 1 Application(s) Rectal two times a day  oxyCODONE  ER Tablet 60 milliGRAM(s) Oral every 12 hours  polyethylene glycol 3350 17 Gram(s) Oral two times a day  senna 2 Tablet(s) Oral at bedtime    MEDICATIONS  (PRN):  albuterol    90 MICROgram(s) HFA Inhaler 2 Puff(s) Inhalation every 6 hours PRN Shortness of Breath and/or Wheezing  artificial tears (preservative free) Ophthalmic Solution 1 Drop(s) Both EYES four times a day PRN Dry Eyes  bisacodyl Suppository 10 milliGRAM(s) Rectal daily PRN Constipation  dextrose Oral Gel 15 Gram(s) Oral once PRN Blood Glucose LESS THAN 70 milliGRAM(s)/deciliter  ondansetron Injectable 4 milliGRAM(s) IV Push every 8 hours PRN Nausea and/or Vomiting  psyllium Powder 1 Packet(s) Oral daily PRN constipation      I&O's Summary    20 Sep 2023 07:01  -  21 Sep 2023 07:00  --------------------------------------------------------  IN: 360 mL / OUT: 0 mL / NET: 360 mL        PHYSICAL EXAM:  Vital Signs Last 24 Hrs  T(C): 36.3 (21 Sep 2023 04:50), Max: 36.4 (20 Sep 2023 20:43)  T(F): 97.4 (21 Sep 2023 04:50), Max: 97.5 (20 Sep 2023 20:43)  HR: 64 (21 Sep 2023 04:50) (64 - 69)  BP: 162/74 (21 Sep 2023 04:50) (129/65 - 162/74)  BP(mean): --  RR: 18 (21 Sep 2023 04:50) (18 - 18)  SpO2: 95% (21 Sep 2023 04:50) (95% - 98%)    Parameters below as of 21 Sep 2023 04:50  Patient On (Oxygen Delivery Method): nasal cannula  O2 Flow (L/min): 4    CONSTITUTIONAL: Well-groomed, in no apparent distress  RESPIRATORY: Breathing comfortably; no dullness to percussion; lungs CTA without wheeze/rhonchi/rales though limited d/t body habitus  CARDIOVASCULAR: Difficult examination d/t body habitus. +S1S2, RRR, no M/G/R; pedal pulses full and symmetric; no lower extremity edema  GASTROINTESTINAL: Surgical scars present. No palpable masses or tenderness, +BS throughout, no rebound/guarding; no hepatosplenomegaly; no hernia palpated  LYMPHATIC: No cervical LAD or tenderness  SKIN: Stasis dermatitis in LE. No rashes or ulcers noted  NEUROLOGIC: ; sensation intact in LEs b/l to light touch  PSYCHIATRIC: A&Ox3; mood and affect appropriate; appropriate insight and judgment    LABS:    09-21    139  |  96  |  53<H>  ----------------------------<  280<H>  4.8   |  30  |  1.92<H>    Ca    9.9      21 Sep 2023 06:45            Urinalysis Basic - ( 21 Sep 2023 06:45 )    Color: x / Appearance: x / SG: x / pH: x  Gluc: 280 mg/dL / Ketone: x  / Bili: x / Urobili: x   Blood: x / Protein: x / Nitrite: x   Leuk Esterase: x / RBC: x / WBC x   Sq Epi: x / Non Sq Epi: x / Bacteria: x          RADIOLOGY & ADDITIONAL TESTS:  Results Reviewed: CMP personally reviewed by me Cr 1.92 within baseline, Na 139,   Imaging Personally Reviewed:  Electrocardiogram Personally Reviewed:    COORDINATION OF CARE:  Care Discussed with Consultants/Other Providers [Y/N]: Y  Prior or Outpatient Records Reviewed [Y/N]: Y

## 2023-09-21 NOTE — PROGRESS NOTE ADULT - PROBLEM SELECTOR PLAN 2
On insulin at home, taking daily but has not been eating appropriately the past two weeks   On D10+NS for now, will continue   Monitor FS q4hrs  Patient with sugars improved after fluids  Will monitor intake  Patient with improved intake at this time.   Will consider  Endocrine if PO intake remains depressed in setting of insulin usage.   Plan to restart basal insulin at this time.

## 2023-09-21 NOTE — PROGRESS NOTE ADULT - ASSESSMENT
67yo F w/ PMH of HTN, HLD, DM2, HFpEF, CKD III, DVT, Uterine CA and recent hospitalization at [facility] from 08/30-09/08 for dyspnea thought likely 2/2 PE and severe pulm HTN, now on Eliquis who presents with a few days of rectal pain. Patient describes the pain and burning and knife like. Since discharge from [facility], patient reported no BM for 12 days. Last colonoscopy was 6 years ago, at that time patient was told she had hemorrhoids (likely internal). Colorectal surgery consulted for evaluation of rectal pain.     RECS:  -No acute surgical intervention   -Based on description of pain, possible anal fissure   -Recommend Metamucil and/or Colace to avoid hard stools   -Increase fiber intake   -Sitz baths (plain warm water) at least four times a day and after every bowel movement for 15-20min each. Avoid toilet paper after a bowel movement, use soap and water or unscented wipes.  -Recommend topical Nifedipine ointment (not carried at the hospital, will need prescription for outpatient)   -Outpatient follow up with Dr. Holman   -Please page with any additional questions or concerns    Red Team  [id number]

## 2023-09-21 NOTE — PROGRESS NOTE ADULT - SUBJECTIVE AND OBJECTIVE BOX
AGUEDA LUIS, MRN [id number], 68yfemale      Patient is a 68y old  Female who presents with a chief complaint of Rectal Pain (21 Sep 2023 12:59)       INTERVAL HPI/OVERNIGHT EVENTS:  Patient seen and evaluated at bedside.  Pt is resting comfortable in NAD. Denies N/V/F/C.    Allergies    wool- rash, itch (Other)  latex (Rash)  adhesives (Rash)  Bactrim (Flushing)    Intolerances        Vital Signs Last 24 Hrs  T(C): 36.4 (21 Sep 2023 21:10), Max: 36.4 (21 Sep 2023 14:37)  T(F): 97.5 (21 Sep 2023 21:10), Max: 97.5 (21 Sep 2023 14:37)  HR: 64 (21 Sep 2023 21:10) (64 - 66)  BP: 108/58 (21 Sep 2023 21:10) (103/54 - 162/74)  BP(mean): --  RR: 18 (21 Sep 2023 21:10) (18 - 18)  SpO2: 95% (21 Sep 2023 21:10) (95% - 96%)    Parameters below as of 21 Sep 2023 21:10  Patient On (Oxygen Delivery Method): nasal cannula        LABS:    09-21    139  |  96  |  53<H>  ----------------------------<  280<H>  4.8   |  30  |  1.92<H>    Ca    9.9      21 Sep 2023 06:45        Urinalysis Basic - ( 21 Sep 2023 06:45 )    Color: x / Appearance: x / SG: x / pH: x  Gluc: 280 mg/dL / Ketone: x  / Bili: x / Urobili: x   Blood: x / Protein: x / Nitrite: x   Leuk Esterase: x / RBC: x / WBC x   Sq Epi: x / Non Sq Epi: x / Bacteria: x      CAPILLARY BLOOD GLUCOSE      POCT Blood Glucose.: 358 mg/dL (21 Sep 2023 21:26)  POCT Blood Glucose.: 384 mg/dL (21 Sep 2023 17:00)  POCT Blood Glucose.: 358 mg/dL (21 Sep 2023 12:16)  POCT Blood Glucose.: 273 mg/dL (21 Sep 2023 08:48)      Lower Extremity Physical Exam:    Vascular: DP/PT 0/4, B/L, CFT <3 seconds B/L, Temperature gradient warm to cool, B/L.   Neuro: Epicritic sensation diminished to the level of digits, B/L.  Musculoskeletal/Ortho: right charcot midfoot collapse  Skin:  bilateral anterior leg venous stasis ulcers, left plantar posterior heel wound to dermis, no drainage, no malodor, no signs of infection. Left plantar posterior deep tissue injury, no signs of infection. Left foot lateral border well- adhered eschar at level of MPJ, previous oupatient laceration well healed.       RADIOLOGY & ADDITIONAL TESTS:

## 2023-09-21 NOTE — PROGRESS NOTE ADULT - ASSESSMENT
69yo F w/ PMH of HTN, HLD, DM2, HFpEF, CKD III, DVT, Uterine CA and recent hospitalization at [facility] from 08/30-09/08 for dyspnea thought likely 2/2 PE and severe pulm HTN, now on Eliquis, pw rectal pain, had bowel movements today, will continue to monitor, seen by colorectal surgery, pending pain management evaluation.

## 2023-09-22 DIAGNOSIS — E11.65 TYPE 2 DIABETES MELLITUS WITH HYPERGLYCEMIA: ICD-10-CM

## 2023-09-22 LAB
GLUCOSE BLDC GLUCOMTR-MCNC: 258 MG/DL — HIGH (ref 70–99)
GLUCOSE BLDC GLUCOMTR-MCNC: 268 MG/DL — HIGH (ref 70–99)
GLUCOSE BLDC GLUCOMTR-MCNC: 377 MG/DL — HIGH (ref 70–99)
GLUCOSE BLDC GLUCOMTR-MCNC: 391 MG/DL — HIGH (ref 70–99)

## 2023-09-22 PROCEDURE — 99233 SBSQ HOSP IP/OBS HIGH 50: CPT

## 2023-09-22 PROCEDURE — 99223 1ST HOSP IP/OBS HIGH 75: CPT

## 2023-09-22 PROCEDURE — 99221 1ST HOSP IP/OBS SF/LOW 40: CPT

## 2023-09-22 PROCEDURE — 73620 X-RAY EXAM OF FOOT: CPT | Mod: 26,50

## 2023-09-22 RX ORDER — INSULIN LISPRO 100/ML
20 VIAL (ML) SUBCUTANEOUS
Refills: 0 | Status: DISCONTINUED | OUTPATIENT
Start: 2023-09-22 | End: 2023-09-25

## 2023-09-22 RX ORDER — HYDROMORPHONE HYDROCHLORIDE 2 MG/ML
2 INJECTION INTRAMUSCULAR; INTRAVENOUS; SUBCUTANEOUS ONCE
Refills: 0 | Status: DISCONTINUED | OUTPATIENT
Start: 2023-09-22 | End: 2023-09-22

## 2023-09-22 RX ORDER — INSULIN GLARGINE 100 [IU]/ML
60 INJECTION, SOLUTION SUBCUTANEOUS AT BEDTIME
Refills: 0 | Status: DISCONTINUED | OUTPATIENT
Start: 2023-09-22 | End: 2023-09-25

## 2023-09-22 RX ORDER — OXYCODONE HYDROCHLORIDE 5 MG/1
5 TABLET ORAL EVERY 6 HOURS
Refills: 0 | Status: DISCONTINUED | OUTPATIENT
Start: 2023-09-22 | End: 2023-09-29

## 2023-09-22 RX ORDER — LIDOCAINE 4 G/100G
1 CREAM TOPICAL DAILY
Refills: 0 | Status: DISCONTINUED | OUTPATIENT
Start: 2023-09-22 | End: 2023-09-29

## 2023-09-22 RX ORDER — INSULIN LISPRO 100/ML
VIAL (ML) SUBCUTANEOUS
Refills: 0 | Status: DISCONTINUED | OUTPATIENT
Start: 2023-09-22 | End: 2023-09-29

## 2023-09-22 RX ORDER — INSULIN GLARGINE 100 [IU]/ML
60 INJECTION, SOLUTION SUBCUTANEOUS AT BEDTIME
Refills: 0 | Status: DISCONTINUED | OUTPATIENT
Start: 2023-09-22 | End: 2023-09-22

## 2023-09-22 RX ORDER — INSULIN GLARGINE 100 [IU]/ML
60 INJECTION, SOLUTION SUBCUTANEOUS
Refills: 0 | Status: DISCONTINUED | OUTPATIENT
Start: 2023-09-22 | End: 2023-09-22

## 2023-09-22 RX ADMIN — Medication 20 UNIT(S): at 17:35

## 2023-09-22 RX ADMIN — OXYCODONE HYDROCHLORIDE 60 MILLIGRAM(S): 5 TABLET ORAL at 06:29

## 2023-09-22 RX ADMIN — ONDANSETRON 4 MILLIGRAM(S): 8 TABLET, FILM COATED ORAL at 06:29

## 2023-09-22 RX ADMIN — Medication 1 TABLET(S): at 12:15

## 2023-09-22 RX ADMIN — INSULIN GLARGINE 60 UNIT(S): 100 INJECTION, SOLUTION SUBCUTANEOUS at 08:20

## 2023-09-22 RX ADMIN — Medication 1: at 22:01

## 2023-09-22 RX ADMIN — Medication 650 MILLIGRAM(S): at 06:28

## 2023-09-22 RX ADMIN — HYDROMORPHONE HYDROCHLORIDE 2 MILLIGRAM(S): 2 INJECTION INTRAMUSCULAR; INTRAVENOUS; SUBCUTANEOUS at 13:20

## 2023-09-22 RX ADMIN — Medication 500 MILLIGRAM(S): at 12:15

## 2023-09-22 RX ADMIN — SENNA PLUS 2 TABLET(S): 8.6 TABLET ORAL at 22:00

## 2023-09-22 RX ADMIN — Medication 1 APPLICATION(S): at 18:14

## 2023-09-22 RX ADMIN — ONDANSETRON 4 MILLIGRAM(S): 8 TABLET, FILM COATED ORAL at 22:00

## 2023-09-22 RX ADMIN — HYDROMORPHONE HYDROCHLORIDE 2 MILLIGRAM(S): 2 INJECTION INTRAMUSCULAR; INTRAVENOUS; SUBCUTANEOUS at 12:15

## 2023-09-22 RX ADMIN — OXYCODONE HYDROCHLORIDE 5 MILLIGRAM(S): 5 TABLET ORAL at 15:49

## 2023-09-22 RX ADMIN — Medication 650 MILLIGRAM(S): at 07:30

## 2023-09-22 RX ADMIN — Medication 650 MILLIGRAM(S): at 07:43

## 2023-09-22 RX ADMIN — OXYCODONE HYDROCHLORIDE 5 MILLIGRAM(S): 5 TABLET ORAL at 22:00

## 2023-09-22 RX ADMIN — Medication 81 MILLIGRAM(S): at 12:15

## 2023-09-22 RX ADMIN — OXYCODONE HYDROCHLORIDE 60 MILLIGRAM(S): 5 TABLET ORAL at 07:30

## 2023-09-22 RX ADMIN — Medication 650 MILLIGRAM(S): at 19:10

## 2023-09-22 RX ADMIN — BUDESONIDE AND FORMOTEROL FUMARATE DIHYDRATE 2 PUFF(S): 160; 4.5 AEROSOL RESPIRATORY (INHALATION) at 18:10

## 2023-09-22 RX ADMIN — OXYCODONE HYDROCHLORIDE 60 MILLIGRAM(S): 5 TABLET ORAL at 19:10

## 2023-09-22 RX ADMIN — OXYCODONE HYDROCHLORIDE 5 MILLIGRAM(S): 5 TABLET ORAL at 16:45

## 2023-09-22 RX ADMIN — Medication 40 MILLIGRAM(S): at 06:29

## 2023-09-22 RX ADMIN — LORATADINE 10 MILLIGRAM(S): 10 TABLET ORAL at 12:15

## 2023-09-22 RX ADMIN — BUDESONIDE AND FORMOTEROL FUMARATE DIHYDRATE 2 PUFF(S): 160; 4.5 AEROSOL RESPIRATORY (INHALATION) at 06:29

## 2023-09-22 RX ADMIN — OXYCODONE HYDROCHLORIDE 60 MILLIGRAM(S): 5 TABLET ORAL at 18:10

## 2023-09-22 RX ADMIN — INSULIN GLARGINE 60 UNIT(S): 100 INJECTION, SOLUTION SUBCUTANEOUS at 22:01

## 2023-09-22 RX ADMIN — Medication 5: at 12:23

## 2023-09-22 RX ADMIN — Medication 650 MILLIGRAM(S): at 00:12

## 2023-09-22 RX ADMIN — Medication 3: at 08:52

## 2023-09-22 RX ADMIN — Medication 1 APPLICATION(S): at 06:30

## 2023-09-22 RX ADMIN — POLYETHYLENE GLYCOL 3350 17 GRAM(S): 17 POWDER, FOR SOLUTION ORAL at 06:30

## 2023-09-22 RX ADMIN — ATORVASTATIN CALCIUM 80 MILLIGRAM(S): 80 TABLET, FILM COATED ORAL at 22:00

## 2023-09-22 RX ADMIN — Medication 1 APPLICATION(S): at 12:33

## 2023-09-22 RX ADMIN — APIXABAN 5 MILLIGRAM(S): 2.5 TABLET, FILM COATED ORAL at 06:29

## 2023-09-22 RX ADMIN — APIXABAN 5 MILLIGRAM(S): 2.5 TABLET, FILM COATED ORAL at 18:13

## 2023-09-22 RX ADMIN — Medication 650 MILLIGRAM(S): at 18:10

## 2023-09-22 NOTE — PROGRESS NOTE ADULT - PROBLEM SELECTOR PLAN 2
On insulin at home, taking daily but has not been eating appropriately the past two weeks   On D10+NS for now, will continue   Monitor FS q4hrs  Patient with sugars improved after fluids  Will monitor intake  Patient with improved intake at this time.   Will consider  Endocrine if PO intake remains depressed in setting of insulin usage.   Plan to restart basal insulin at this time. Takes 60 units BID Lantus, will monitor response before restarting nutritional insulin.

## 2023-09-22 NOTE — PROGRESS NOTE ADULT - SUBJECTIVE AND OBJECTIVE BOX
[facility] Division of Hospital Medicine  Andrea Munoz DO  Pager (M-F, 8A-5P):  MS Teams PREFERRED        SUBJECTIVE / OVERNIGHT EVENTS:      MEDICATIONS  (STANDING):  acetaminophen     Tablet .. 650 milliGRAM(s) Oral every 6 hours  apixaban 5 milliGRAM(s) Oral every 12 hours  ascorbic acid 500 milliGRAM(s) Oral daily  aspirin enteric coated 81 milliGRAM(s) Oral daily  atorvastatin 80 milliGRAM(s) Oral at bedtime  budesonide 160 MICROgram(s)/formoterol 4.5 MICROgram(s) Inhaler 2 Puff(s) Inhalation two times a day  cadexomer iodine 0.9% Gel 1 Application(s) Topical daily  dextrose 5%. 1000 milliLiter(s) (50 mL/Hr) IV Continuous <Continuous>  dextrose 5%. 1000 milliLiter(s) (100 mL/Hr) IV Continuous <Continuous>  dextrose 50% Injectable 12.5 Gram(s) IV Push once  dextrose 50% Injectable 25 Gram(s) IV Push once  dextrose 50% Injectable 25 Gram(s) IV Push once  furosemide    Tablet 40 milliGRAM(s) Oral daily  glucagon  Injectable 1 milliGRAM(s) IntraMuscular once  HYDROmorphone   Tablet 2 milliGRAM(s) Oral once  insulin lispro (ADMELOG) corrective regimen sliding scale   SubCutaneous three times a day before meals  insulin lispro (ADMELOG) corrective regimen sliding scale   SubCutaneous at bedtime  loratadine 10 milliGRAM(s) Oral daily  Nephro-dipesh 1 Tablet(s) Oral daily  nitroglycerin  0.2% Ointment Compound 1 Application(s) Rectal two times a day  oxyCODONE  ER Tablet 60 milliGRAM(s) Oral every 12 hours  polyethylene glycol 3350 17 Gram(s) Oral two times a day  senna 2 Tablet(s) Oral at bedtime    MEDICATIONS  (PRN):  albuterol    90 MICROgram(s) HFA Inhaler 2 Puff(s) Inhalation every 6 hours PRN Shortness of Breath and/or Wheezing  artificial tears (preservative free) Ophthalmic Solution 1 Drop(s) Both EYES four times a day PRN Dry Eyes  bisacodyl Suppository 10 milliGRAM(s) Rectal daily PRN Constipation  dextrose Oral Gel 15 Gram(s) Oral once PRN Blood Glucose LESS THAN 70 milliGRAM(s)/deciliter  ondansetron Injectable 4 milliGRAM(s) IV Push every 8 hours PRN Nausea and/or Vomiting  psyllium Powder 1 Packet(s) Oral daily PRN constipation      I&O's Summary    21 Sep 2023 07:01  -  22 Sep 2023 07:00  --------------------------------------------------------  IN: 0 mL / OUT: 500 mL / NET: -500 mL        PHYSICAL EXAM:  Vital Signs Last 24 Hrs  T(C): 36.3 (22 Sep 2023 04:47), Max: 36.4 (21 Sep 2023 14:37)  T(F): 97.4 (22 Sep 2023 04:47), Max: 97.5 (21 Sep 2023 14:37)  HR: 71 (22 Sep 2023 04:47) (64 - 71)  BP: 114/67 (22 Sep 2023 04:47) (103/54 - 114/67)  BP(mean): --  RR: 18 (22 Sep 2023 04:47) (18 - 18)  SpO2: 97% (22 Sep 2023 04:47) (95% - 97%)    Parameters below as of 22 Sep 2023 04:47  Patient On (Oxygen Delivery Method): nasal cannula  O2 Flow (L/min): 4    CONSTITUTIONAL: Well-groomed, in no apparent distress  RESPIRATORY: Breathing comfortably; no dullness to percussion; lungs CTA without wheeze/rhonchi/rales though limited d/t body habitus  CARDIOVASCULAR: Difficult examination d/t body habitus. +S1S2, RRR, no M/G/R; pedal pulses full and symmetric; no lower extremity edema  GASTROINTESTINAL: Surgical scars present. No palpable masses or tenderness, +BS throughout, no rebound/guarding; no hepatosplenomegaly; no hernia palpated  LYMPHATIC: No cervical LAD or tenderness  SKIN: Stasis dermatitis in LE. No rashes or ulcers noted  NEUROLOGIC: ; sensation intact in LEs b/l to light touch  PSYCHIATRIC: A&Ox3; mood and affect appropriate; appropriate insight and judgment      LABS:    09-21    139  |  96  |  53<H>  ----------------------------<  280<H>  4.8   |  30  |  1.92<H>    Ca    9.9      21 Sep 2023 06:45            Urinalysis Basic - ( 21 Sep 2023 06:45 )    Color: x / Appearance: x / SG: x / pH: x  Gluc: 280 mg/dL / Ketone: x  / Bili: x / Urobili: x   Blood: x / Protein: x / Nitrite: x   Leuk Esterase: x / RBC: x / WBC x   Sq Epi: x / Non Sq Epi: x / Bacteria: x          RADIOLOGY & ADDITIONAL TESTS:  Results Reviewed:   Imaging Personally Reviewed:  Electrocardiogram Personally Reviewed:    COORDINATION OF CARE:  Care Discussed with Consultants/Other Providers [Y/N]: Y  Prior or Outpatient Records Reviewed [Y/N]: BILLY

## 2023-09-22 NOTE — PROGRESS NOTE ADULT - ASSESSMENT
67yo F w/ PMH of HTN, HLD, DM2, HFpEF, CKD III, DVT, Uterine CA and recent hospitalization at [facility] from 08/30-09/08 for dyspnea thought likely 2/2 PE and severe pulm HTN, now on Eliquis, presents with rectal pain, had bowel movements today, will continue to monitor, seen by colorectal surgery, pending pain management evaluation.

## 2023-09-22 NOTE — CONSULT NOTE ADULT - SUBJECTIVE AND OBJECTIVE BOX
HPI:  67 y/o F w/ hx of DM2 x >30 years. Complicated by neuropathy with Charcot and nephropathy. Was 1st told she had GDM during a twin pregnancy and then developed DM2. Home regimen is Levemir 60 units in the morning and 60 units qhs and Novolog 10 units with meals. Was previously on Novolin 70/30 76u qam an 70u qhs as well as Humalog 5-5-5, but now on basal bolus. Checks FS's daily with values generally in the 100-300 range without hypoglycemia unless she goes too long without eating. She is overdue for optho. She has had cataracts and narrow angle glaucoma in the past, s/p lasert x. She has sx of neuropathy and PVD and has frequent podiatry home visits. Tries to monitor carbs. No diabetes in parents or siblings. +polyuria and polydipsia. No recent optho follow-up. No family hx of diabetes.   Also hx of HTN, HLD, HFpEF, CKD III, DVT, Uterine CA and recent hospitalization at [facility] from 08/30-09/08 for dyspnea thought likely 2/2 PE and severe pulm HTN, now on Eliquis, pw rectal pain.    Since discharge from [facility] she reports decreased oral intake and nausea w/ no BM in the past 12 days. Still passing gas. Over the past 24 hours she reports rectal pain which occurs even with small movement. She otherwise denies any bleeding, no F/C, or urinary symptoms.     She did report some abd pain and CP in the ED though on encounter reports improvement of that pain in the lower quadrants as well as the atypical CP which she states she gets intermittently.    In the ED VSS w/ labs notable for hypoglycemia to 47, Cr of 1.93 and Trop 83- 78 and CXR w/ mild congestion and CT A/P w/out acute findings. She was administered D50 and started on D10/NS as well as administered Morphine 4mg x2.   (18 Sep 2023 02:00)      PAST MEDICAL & SURGICAL HISTORY:  Diabetes Mellitus Type II      HTN (Hypertension)      Endometrial Hyperplasia      Cervical Stenosis of Spine      Spinal Stenosis, Lumbar      Deep Vein Thrombosis (DVT)  Left leg, 2004, treated and resolved      Dyslipidemia      Cataract      Morbid Obesity      Vitamin D deficiency      Insomnia      CKD (chronic kidney disease)  ~ III      Congestive heart failure  ~ HFpEF      Uterine cancer      Asthma      On home oxygen therapy      Gait difficulty  ~ u ses walker      Cataract extracted with lens implant 1998  Right      C Section 1994      Cholecystectomy/appendectomy @ age 25      D&C x2 1980's      D&C 2008  hysteroscopy, endometrial hyperplasia, 2009      Cervical Spinal Stenosis surgery x2 (01/2002, 7/2002)      Tonsillectomy as a child      Endometrial biopsy 12/02/09      H/O laser iridotomy  left eye, 2016      H/O colonoscopy  1998      S/P total abdominal hysterectomy and bilateral salpingo-oophorectomy      S/P appendectomy      S/P cholecystectomy          FAMILY HISTORY:  Family history of pancreatic cancer    Family history of bladder cancer    Family history of lung cancer (Grandparent)        Social History: No tobacco or alcohol use    Outpatient Medications:  · 	cholecalciferol oral tablet: Last Dose Taken:  , 2000 unit(s) orally once a day  · 	ocular lubricant ophthalmic solution: Last Dose Taken:  , 1 drop(s) to each affected eye 2 times a day  · 	cadexomer iodine 0.9% topical gel: Last Dose Taken:  , 1 Apply topically to affected area once a day  · 	apixaban 5 mg oral tablet: Last Dose Taken:  , 1 tab(s) orally every 12 hours  · 	furosemide 40 mg oral tablet: Last Dose Taken:  , 1 tab(s) orally once a day  · 	loratadine 10 mg oral tablet: Last Dose Taken:  , 1 tab(s) orally once a day  · 	nystatin 100,000 units/g topical powder: Last Dose Taken:  , 1 application topically 2 times a day  · 	Aspirin Enteric Coated 81 mg oral delayed release tablet: Last Dose Taken:  , 1 tab(s) orally once a day  · 	rosuvastatin 20 mg oral tablet: Last Dose Taken:  , 1 tab(s) orally once a day (at bedtime)  · 	OxyCONTIN 60 mg oral tablet, extended release: Last Dose Taken:  , 1 tab(s) orally every 12 hours  · 	lisinopril 5 mg oral tablet: Last Dose Taken:  , 1 tab(s) orally once a day  · 	ProAir HFA 90 mcg/inh inhalation aerosol: Last Dose Taken:  , 2 puff(s) inhaled every 6 hours, As Needed  · 	Toujeo SoloStar 300 units/mL subcutaneous solution: Last Dose Taken:  , 60 unit(s) subcutaneous 2 times a day - once in AM and once at bedtime  · 	ADVAIR -21 MCG INHALER: Last Dose Taken:  , TAKE 2 PUFFS BY MOUTH TWICE A DAY  · 	NovoLOG FlexPen 100 units/mL injectable solution: Last Dose Taken:  , 10 unit(s) injectable 3 times a day (before meals)  · 	polyethylene glycol 3350 oral powder for reconstitution: Last Dose Taken:  , 17 gram(s) orally once a day    MEDICATIONS  (STANDING):  acetaminophen     Tablet .. 650 milliGRAM(s) Oral every 6 hours  apixaban 5 milliGRAM(s) Oral every 12 hours  ascorbic acid 500 milliGRAM(s) Oral daily  aspirin enteric coated 81 milliGRAM(s) Oral daily  atorvastatin 80 milliGRAM(s) Oral at bedtime  budesonide 160 MICROgram(s)/formoterol 4.5 MICROgram(s) Inhaler 2 Puff(s) Inhalation two times a day  cadexomer iodine 0.9% Gel 1 Application(s) Topical daily  dextrose 5%. 1000 milliLiter(s) (50 mL/Hr) IV Continuous <Continuous>  dextrose 5%. 1000 milliLiter(s) (100 mL/Hr) IV Continuous <Continuous>  dextrose 50% Injectable 12.5 Gram(s) IV Push once  dextrose 50% Injectable 25 Gram(s) IV Push once  dextrose 50% Injectable 25 Gram(s) IV Push once  furosemide    Tablet 40 milliGRAM(s) Oral daily  glucagon  Injectable 1 milliGRAM(s) IntraMuscular once  insulin glargine Injectable (LANTUS) 60 Unit(s) SubCutaneous <User Schedule>  insulin lispro (ADMELOG) corrective regimen sliding scale   SubCutaneous at bedtime  insulin lispro (ADMELOG) corrective regimen sliding scale   SubCutaneous three times a day before meals  loratadine 10 milliGRAM(s) Oral daily  Nephro-dipesh 1 Tablet(s) Oral daily  nitroglycerin  0.2% Ointment Compound 1 Application(s) Rectal two times a day  oxyCODONE  ER Tablet 60 milliGRAM(s) Oral every 12 hours  polyethylene glycol 3350 17 Gram(s) Oral two times a day  senna 2 Tablet(s) Oral at bedtime    MEDICATIONS  (PRN):  albuterol    90 MICROgram(s) HFA Inhaler 2 Puff(s) Inhalation every 6 hours PRN Shortness of Breath and/or Wheezing  artificial tears (preservative free) Ophthalmic Solution 1 Drop(s) Both EYES four times a day PRN Dry Eyes  bisacodyl Suppository 10 milliGRAM(s) Rectal daily PRN Constipation  dextrose Oral Gel 15 Gram(s) Oral once PRN Blood Glucose LESS THAN 70 milliGRAM(s)/deciliter  ondansetron Injectable 4 milliGRAM(s) IV Push every 8 hours PRN Nausea and/or Vomiting  oxyCODONE    IR 5 milliGRAM(s) Oral every 6 hours PRN Severe Pain (7 - 10)  psyllium Powder 1 Packet(s) Oral daily PRN constipation      Allergies    wool- rash, itch (Other)  latex (Rash)  adhesives (Rash)  Bactrim (Flushing)    Intolerances      Review of Systems:  Constitutional: No fever, No change in weight  Eyes: No blurry vision  Neuro: No headache, No paresthesias  HEENT: No throat pain  Cardiovascular: No chest pain  Respiratory: No SOB  GI: No nausea or vomiting  : No polyuria  Skin: no rash  Psych: no depression  Endocrine: No polydipsia, No heat or cold intolerance, rest as noted in HPI  Hem/lymph: no swelling    All other review of systems negative      PHYSICAL EXAM:  VITALS: T(C): 36.8 (09-22-23 @ 13:48)  T(F): 98.2 (09-22-23 @ 13:48), Max: 98.2 (09-22-23 @ 13:48)  HR: 73 (09-22-23 @ 13:48) (64 - 73)  BP: 152/82 (09-22-23 @ 13:48) (108/58 - 152/82)  RR:  (18 - 18)  SpO2:  (95% - 100%)  Wt(kg): --  GENERAL: NAD at this time  EYES: No proptosis, EOMI  HEENT:  Atraumatic, Normocephalic,   THYROID: Normal size, no palpable nodules  RESPIRATORY: Clear to auscultation bilaterally, full excursion, non-labored  CARDIOVASCULAR: Regular rhythm; No murmurs; no peripheral edema  GI: Soft, nontender, non distended, normal bowel sounds  SKIN: Dry, intact, No rashes or lesions  MUSCULOSKELETAL: normal strength  NEURO: follows commands, no tremor, normal reflexes  PSYCH: Alert and oriented x 3, normal affect, normal mood  CUSHING'S SIGNS: no striae      POCT Blood Glucose.: 391 mg/dL (09-22-23 @ 12:05)  POCT Blood Glucose.: 268 mg/dL (09-22-23 @ 08:18)  POCT Blood Glucose.: 358 mg/dL (09-21-23 @ 21:26)  POCT Blood Glucose.: 384 mg/dL (09-21-23 @ 17:00)  POCT Blood Glucose.: 358 mg/dL (09-21-23 @ 12:16)  POCT Blood Glucose.: 273 mg/dL (09-21-23 @ 08:48)  POCT Blood Glucose.: 352 mg/dL (09-20-23 @ 21:59)  POCT Blood Glucose.: 328 mg/dL (09-20-23 @ 16:39)  POCT Blood Glucose.: 282 mg/dL (09-20-23 @ 12:03)  POCT Blood Glucose.: 198 mg/dL (09-20-23 @ 07:59)  POCT Blood Glucose.: 254 mg/dL (09-19-23 @ 22:29)  POCT Blood Glucose.: 281 mg/dL (09-19-23 @ 17:01)            09-21    139  |  96  |  53<H>  ----------------------------<  280<H>  4.8   |  30  |  1.92<H>    eGFR: 28<L>    Ca    9.9      09-21      Thyroid Function Tests:  09-17 @ 16:14 TSH 3.50 FreeT4 -- T3 71 Anti TPO -- Anti Thyroglobulin Ab -- TSI --            Radiology:                  HPI:  69 y/o F w/ hx of DM2 x >30 years. Complicated by neuropathy with Charcot and nephropathy. Was 1st told she had GDM during a twin pregnancy and then developed DM2. Home regimen is Levemir 84 units in the morning and 80 units qhs and Humalog 23 units with meals. Was previously on Novolin 70/30 76u qam an 70u qhs as well as Humalog 5-5-5, but now on basal bolus. Checks FS's daily with values generally in the 100-300 range without hypoglycemia unless she goes too long without eating. She is overdue for optho. She has had cataracts and narrow angle glaucoma in the past, s/p lasert x. She has sx of neuropathy and PVD and has frequent podiatry home visits. Tries to monitor carbs. No diabetes in parents or siblings. +polyuria and polydipsia. No recent optho follow-up. No family hx of diabetes.   Also hx of HTN, HLD, HFpEF, CKD III, DVT, Uterine CA and recent hospitalization at [facility] from 08/30-09/08 for dyspnea thought likely 2/2 PE and severe pulm HTN, now on Eliquis, pw rectal pain.    Since discharge from [facility] she reports decreased oral intake and nausea w/ no BM in the past 12 days. Still passing gas. Over the past 24 hours she reports rectal pain which occurs even with small movement. She otherwise denies any bleeding, no F/C, or urinary symptoms.     She did report some abd pain and CP in the ED though on encounter reports improvement of that pain in the lower quadrants as well as the atypical CP which she states she gets intermittently.    In the ED VSS w/ labs notable for hypoglycemia to 47, Cr of 1.93 and Trop 83- 78 and CXR w/ mild congestion and CT A/P w/out acute findings. She was administered D50 and started on D10/NS as well as administered Morphine 4mg x2.   (18 Sep 2023 02:00)      PAST MEDICAL & SURGICAL HISTORY:  Diabetes Mellitus Type II      HTN (Hypertension)      Endometrial Hyperplasia      Cervical Stenosis of Spine      Spinal Stenosis, Lumbar      Deep Vein Thrombosis (DVT)  Left leg, 2004, treated and resolved      Dyslipidemia      Cataract      Morbid Obesity      Vitamin D deficiency      Insomnia      CKD (chronic kidney disease)  ~ III      Congestive heart failure  ~ HFpEF      Uterine cancer      Asthma      On home oxygen therapy      Gait difficulty  ~ u ses walker      Cataract extracted with lens implant 1998  Right      C Section 1994      Cholecystectomy/appendectomy @ age 25      D&C x2 1980's      D&C 2008  hysteroscopy, endometrial hyperplasia, 2009      Cervical Spinal Stenosis surgery x2 (01/2002, 7/2002)      Tonsillectomy as a child      Endometrial biopsy 12/02/09      H/O laser iridotomy  left eye, 2016      H/O colonoscopy  1998      S/P total abdominal hysterectomy and bilateral salpingo-oophorectomy      S/P appendectomy      S/P cholecystectomy          FAMILY HISTORY:  Family history of pancreatic cancer    Family history of bladder cancer    Family history of lung cancer (Grandparent)        Social History: No tobacco or alcohol use    Outpatient Medications:  · 	cholecalciferol oral tablet: Last Dose Taken:  , 2000 unit(s) orally once a day  · 	ocular lubricant ophthalmic solution: Last Dose Taken:  , 1 drop(s) to each affected eye 2 times a day  · 	cadexomer iodine 0.9% topical gel: Last Dose Taken:  , 1 Apply topically to affected area once a day  · 	apixaban 5 mg oral tablet: Last Dose Taken:  , 1 tab(s) orally every 12 hours  · 	furosemide 40 mg oral tablet: Last Dose Taken:  , 1 tab(s) orally once a day  · 	loratadine 10 mg oral tablet: Last Dose Taken:  , 1 tab(s) orally once a day  · 	nystatin 100,000 units/g topical powder: Last Dose Taken:  , 1 application topically 2 times a day  · 	Aspirin Enteric Coated 81 mg oral delayed release tablet: Last Dose Taken:  , 1 tab(s) orally once a day  · 	rosuvastatin 20 mg oral tablet: Last Dose Taken:  , 1 tab(s) orally once a day (at bedtime)  · 	OxyCONTIN 60 mg oral tablet, extended release: Last Dose Taken:  , 1 tab(s) orally every 12 hours  · 	lisinopril 5 mg oral tablet: Last Dose Taken:  , 1 tab(s) orally once a day  · 	ProAir HFA 90 mcg/inh inhalation aerosol: Last Dose Taken:  , 2 puff(s) inhaled every 6 hours, As Needed  · 	Toujeo SoloStar 300 units/mL subcutaneous solution: Last Dose Taken:  , 60 unit(s) subcutaneous 2 times a day - once in AM and once at bedtime  · 	ADVAIR -21 MCG INHALER: Last Dose Taken:  , TAKE 2 PUFFS BY MOUTH TWICE A DAY  · 	NovoLOG FlexPen 100 units/mL injectable solution: Last Dose Taken:  , 10 unit(s) injectable 3 times a day (before meals)  · 	polyethylene glycol 3350 oral powder for reconstitution: Last Dose Taken:  , 17 gram(s) orally once a day    MEDICATIONS  (STANDING):  acetaminophen     Tablet .. 650 milliGRAM(s) Oral every 6 hours  apixaban 5 milliGRAM(s) Oral every 12 hours  ascorbic acid 500 milliGRAM(s) Oral daily  aspirin enteric coated 81 milliGRAM(s) Oral daily  atorvastatin 80 milliGRAM(s) Oral at bedtime  budesonide 160 MICROgram(s)/formoterol 4.5 MICROgram(s) Inhaler 2 Puff(s) Inhalation two times a day  cadexomer iodine 0.9% Gel 1 Application(s) Topical daily  dextrose 5%. 1000 milliLiter(s) (50 mL/Hr) IV Continuous <Continuous>  dextrose 5%. 1000 milliLiter(s) (100 mL/Hr) IV Continuous <Continuous>  dextrose 50% Injectable 12.5 Gram(s) IV Push once  dextrose 50% Injectable 25 Gram(s) IV Push once  dextrose 50% Injectable 25 Gram(s) IV Push once  furosemide    Tablet 40 milliGRAM(s) Oral daily  glucagon  Injectable 1 milliGRAM(s) IntraMuscular once  insulin glargine Injectable (LANTUS) 60 Unit(s) SubCutaneous <User Schedule>  insulin lispro (ADMELOG) corrective regimen sliding scale   SubCutaneous at bedtime  insulin lispro (ADMELOG) corrective regimen sliding scale   SubCutaneous three times a day before meals  loratadine 10 milliGRAM(s) Oral daily  Nephro-dipesh 1 Tablet(s) Oral daily  nitroglycerin  0.2% Ointment Compound 1 Application(s) Rectal two times a day  oxyCODONE  ER Tablet 60 milliGRAM(s) Oral every 12 hours  polyethylene glycol 3350 17 Gram(s) Oral two times a day  senna 2 Tablet(s) Oral at bedtime    MEDICATIONS  (PRN):  albuterol    90 MICROgram(s) HFA Inhaler 2 Puff(s) Inhalation every 6 hours PRN Shortness of Breath and/or Wheezing  artificial tears (preservative free) Ophthalmic Solution 1 Drop(s) Both EYES four times a day PRN Dry Eyes  bisacodyl Suppository 10 milliGRAM(s) Rectal daily PRN Constipation  dextrose Oral Gel 15 Gram(s) Oral once PRN Blood Glucose LESS THAN 70 milliGRAM(s)/deciliter  ondansetron Injectable 4 milliGRAM(s) IV Push every 8 hours PRN Nausea and/or Vomiting  oxyCODONE    IR 5 milliGRAM(s) Oral every 6 hours PRN Severe Pain (7 - 10)  psyllium Powder 1 Packet(s) Oral daily PRN constipation      Allergies    wool- rash, itch (Other)  latex (Rash)  adhesives (Rash)  Bactrim (Flushing)    Intolerances      Review of Systems:  Constitutional: No fever, No change in weight  Eyes: +cataract  Neuro: No headache, +neuropathy  HEENT: No throat pain  Cardiovascular: No chest pain  Respiratory: + SOB  GI: No nausea or vomiting  : + polyuria  Skin: no rash  Psych: no depression  Endocrine: + polydipsia, No heat or cold intolerance, rest as noted in HPI  Hem/lymph: no swelling    All other review of systems negative      PHYSICAL EXAM:  VITALS: T(C): 36.8 (09-22-23 @ 13:48)  T(F): 98.2 (09-22-23 @ 13:48), Max: 98.2 (09-22-23 @ 13:48)  HR: 73 (09-22-23 @ 13:48) (64 - 73)  BP: 152/82 (09-22-23 @ 13:48) (108/58 - 152/82)  RR:  (18 - 18)  SpO2:  (95% - 100%)  Wt(kg): --  GENERAL: NAD at this time  EYES: No proptosis, EOMI  HEENT:  Atraumatic, Normocephalic,   THYROID: unable to fully appreciate due to body habitus  RESPIRATORY: Clear to auscultation bilaterally, full excursion, non-labored  CARDIOVASCULAR: Regular rhythm; No murmurs; +b/l LE peripheral edema  GI: Soft, nontender, non distended, normal bowel sounds  SKIN: Dry, intact, +b/l LE dressings  MUSCULOSKELETAL: normal strength  NEURO: follows commands  PSYCH: Alert and oriented x 3, normal affect, normal mood  CUSHING'S SIGNS: no striae +hirsutism      POCT Blood Glucose.: 391 mg/dL (09-22-23 @ 12:05)  POCT Blood Glucose.: 268 mg/dL (09-22-23 @ 08:18)  POCT Blood Glucose.: 358 mg/dL (09-21-23 @ 21:26)  POCT Blood Glucose.: 384 mg/dL (09-21-23 @ 17:00)  POCT Blood Glucose.: 358 mg/dL (09-21-23 @ 12:16)  POCT Blood Glucose.: 273 mg/dL (09-21-23 @ 08:48)  POCT Blood Glucose.: 352 mg/dL (09-20-23 @ 21:59)  POCT Blood Glucose.: 328 mg/dL (09-20-23 @ 16:39)  POCT Blood Glucose.: 282 mg/dL (09-20-23 @ 12:03)  POCT Blood Glucose.: 198 mg/dL (09-20-23 @ 07:59)  POCT Blood Glucose.: 254 mg/dL (09-19-23 @ 22:29)  POCT Blood Glucose.: 281 mg/dL (09-19-23 @ 17:01)            09-21    139  |  96  |  53<H>  ----------------------------<  280<H>  4.8   |  30  |  1.92<H>    eGFR: 28<L>    Ca    9.9      09-21      Thyroid Function Tests:  09-17 @ 16:14 TSH 3.50 FreeT4 -- T3 71 Anti TPO -- Anti Thyroglobulin Ab -- TSI --            Radiology:

## 2023-09-22 NOTE — CONSULT NOTE ADULT - ASSESSMENT
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 
68F PMHx HTN, HLD, DM2, HFpEF, CKD III, DVT, Uterine CA, MEGAN/BSO, Cholecystectomy/appendectomy at 26 y/o, cervical spine surgery 2002. Recent hospitalization at [facility] from 08/30-09/08 for dyspnea thought likely 2/2 PE and severe pulm HTN, now on Eliquis.    Admitted w/ severe Rectal pain.   Since discharge from [facility] she reports decreased oral intake and nausea w/ no BM x 12d  Per Colorectal surgery, hemorrhoids, no surgical intervention, out patient f/u.    Current out- patient pain regimen: OxyContin 60 mg BID  Out Patient Pain Management provider: None, Rxs from Dr. Hemal Valentine (PCP). States she cannot see pain management because she is mostly bed-bound at home    Pt w/ chronic back pain but now w/ c/o severe rectal pain described as "grinding glass". Reports negative reaction to Gabapentin in past and does not wish to take antidepressants for nerve pain. (+)effect w/ Oxy 5 mg. Pt encouraged to seek out patient pain provider for other therapy options for her chronic pain.    Plan discussed with primary team:  Continue OxyContin 60 mg BID  Lidocaine jelly or suppositories to rectum  Oxy IR 5 mg every 6 hrs PRN severe pain x 3-4 days for acute pain  Continue Tylenol  Nifedipine and NTG per Colorectal Surgery  Bowel Regimen  Incentive Spirometer  PT per primary team  Monitor for sedation, respiratory depression  Out-patient pain practice list provided for pain management after discharge  Narcan Rescue Kit on discharge (Naloxone 4 mg/0.1 ml nasal spray - 1 spray q 2-3 minutes alternating between nostrils)    Signing off, reconsult as needed    Time spent on encounter:  45      Minutes    Chronic Pain Service  [phone]
ASSESSMENT: 69yo F w/ PMH of HTN, HLD, DM2, HFpEF, CKD III, DVT, Uterine CA and recent hospitalization at [facility] from 08/30-09/08 for dyspnea thought likely 2/2 PE and severe pulm HTN, now on Eliquis who presents with a few days of rectal pain. Patient describes the pain and burning and knife like. Since discharge from [facility], patient reported no BM for 12 days. Last colonoscopy was 6 years ago, at that time patient was told she had hemorrhoids (likely internal). Colorectal surgery consulted for evaluation of rectal pain.     RECS:  -No acute surgical intervention   -No external hemorrhoids noted on exam  -Based on description of pain, possible anal fissure   -Recommend Metamucil and/or Colace to avoid hard stools   -Increase fiber intake   -Sitz baths (plain warm water) at least four times a day and after every bowel movement for 15-20min each. Avoid toilet paper after a bowel movement, use soap and water or unscented wipes.  -Recommend topical Nifedipine ointment (not carried at the hospital, will need prescription for outpatient)   -Outpatient follow up with Dr. Holman     Red Team, [id number]     
69yo F w/ PMH of HTN, HLD, DM2, HFpEF, CKD III, DVT, Uterine CA and recent hospitalization at [facility] from 08/30-09/08 for dyspnea thought likely 2/2 PE and severe pulm HTN, now on Eliquis, pw rectal pain, had bowel movements today, will continue to monitor, if patient pain persists may need Surg evaluation for thrombosed hemorrhoids.       Wound Consult requested to assist w/ management of:  BLE PVD/ PAD w/ stasis dermatitis  BLE fungal dermatitis  BLE feet w/ wounds  Incontinence of urine and stool   Moisture Dermatitis of Groin/ Panus skin folds    BLE ACTICOAT + ABD pads QD  BLE elevation & Compression  BLE feet wounds-  awaiting dpm   Consider VINOD/PVR & multilayer compression  Abx per Medicine- ? PO tx for fungal rash on shins  Moisturize intact skin w/ SWEEN cream BID  Nutrition Consult for optimization            encourage high quality protein, MVI & Vit C to promote wound healing  Hyperglycemia - ADA diet and Lantus & FS w/ ISS  Continue turning and positioning w/ offloading assistive devices as per protocol  GROIN/ SKIN FOlds/ PANUS- After cleansing - Tuck INTERDry Ag QD and prn soiling  Buttocks/ Sacrum  TRIAD BID and prn soiling        Continue w/ attends under pads and Pericare w/ purewick care as per protocol  Waffle Cushion to chair when oob to chair  Continue w/ low air loss pressure redistribution bed surface   Care as per medicine, will follow w/ you  Upon discharge f/u as outpatient at [facility] [address] [facility] [phone]  D/w team & RN & attng  Thank you for this consult  Etelvina Osborne PA-C [id number]  Nights/ Weekends/ Holidays please call:  General Surgery Consult pager [phone]) for emergencies  Wound PT for multilayer leg wrapping or VAC issues ([phone])   I spent 55  minutes face to face w/ this pt of which more than 50% of the time was spent counseling & coordinating care of this pt.

## 2023-09-22 NOTE — CONSULT NOTE ADULT - PROBLEM SELECTOR RECOMMENDATION 9
Diabetes Education and Nutrition Eval  Based on prior hospitalization insulin requirements recommend:  Monitor on Lantus 60 units daily  Start Admelog 20 units qac  Mod correction scale qac + bedtime  Goal glucose 100-180  Outpt. endo follow-up  Outpt. optho, podiatry, nephrology  Plan to d/c on basal bolus

## 2023-09-22 NOTE — CONSULT NOTE ADULT - SUBJECTIVE AND OBJECTIVE BOX
Chief Complaint:  Patient is a 68y old  Female who presents with a chief complaint of Rectal Pain (22 Sep 2023 11:31)    HPI:  Pt is a 69yo F w/ PMH of HTN, HLD, DM2, HFpEF, CKD III, DVT, Uterine CA and recent hospitalization at [facility] from 08/30-09/08 for dyspnea thought likely 2/2 PE and severe pulm HTN, now on Eliquis, pw rectal pain.     Since discharge from [facility] she reports decreased oral intake and nausea w/ no BM in the past 12 days. Still passing gas. Over the past 24 hours she reports rectal pain which occurs even with small movement. She otherwise denies any bleeding, no F/C, or urinary symptoms.     She did report some abd pain and CP in the ED though on encounter reports improvement of that pain in the lower quadrants as well as the atypical CP which she states she gets intermittently.    In the ED VSS w/ labs notable for hypoglycemia to 47, Cr of 1.93 and Trop 83- 78 and CXR w/ mild congestion and CT A/P w/out acute findings. She was administered D50 and started on D10/NS as well as administered Morphine 4mg x2.   (18 Sep 2023 02:00)    Current out- patient pain regimen: OxyContin 60 mg BID    Out Patient Pain Management provider: None, Rxs from Dr. Hemal Valentine (PCP). States she cannot see pain management because she is mostly bed-bound at home    [facility] Prescription Monitoring Program: Reference #[id number]    Opioid Risk Tool (ORT-OUD) Score: Low    Pain Score: 10/10    Pt seen lying in bed, pleasant, has chronic back pain but now w/ c/o severe rectal pain described as "grinding glass" especially worse w/ flatus and BM. Discussed nerve pain and benefits of Gabapentinoids and antidepressants. Pt reports taking Neurontin in the past and developed severe, right sided abdominal pain after 2 doses so MD told her to stop it. Took Cymbalta recently while at [facility], didn't like the way it made her feel so she stopped it, does not wish to take antidepressants. Pt encouraged to seek out patient pain provider for other therapy options.    REVIEW OF SYSTEMS:  CONSTITUTIONAL: No fever, weight loss, fatigue, falls  NEURO: No headaches, memory loss, loss of strength, tremors, dizziness or blurred vision  RESP: No shortness of breath, cough  CV: No chest pain, palpitations  GI: No abdominal pain, nausea, vomiting, diarrhea, constipation, incontinence, (+)flatus; severe rectal pain  : No urinary incontinence/retention  MSK: Chronic back pain, endorses chronic, generalized weakness and deconditioning   SKIN: No itching, burning, rashes, (+)multiple lesions B/L distal LE  PSYCHIATRIC: No depression, anxiety, mood swings, or difficulty sleeping      PHYSICAL EXAM  GENERAL: Seen at bedside, NAD, disheveled appearance, obese, appears stated age, no signs of toxicity  NEURO: Alert & Oriented X3, Good concentration; Follows commands   HEENT: Head atraumatic, normocephalic; speech clear and fluent  GI: Appetite fair, BM yesterday  : Voiding via Female urinary device  EXTREMITIES:  moving all extremities, B/L LE wound dressings C/D/I  PSYCH: affect normal; good eye contact; no signs of depression or anxiety    PAST MEDICAL & SURGICAL HISTORY:  Diabetes Mellitus Type II      HTN (Hypertension)      Endometrial Hyperplasia      Cervical Stenosis of Spine      Spinal Stenosis, Lumbar      Deep Vein Thrombosis (DVT)  Left leg, 2004, treated and resolved      Dyslipidemia      Cataract      Morbid Obesity      Vitamin D deficiency      Insomnia      CKD (chronic kidney disease)  ~ III      Congestive heart failure  ~ HFpEF      Uterine cancer      Asthma      On home oxygen therapy      Gait difficulty  ~ u ses walker      Cataract extracted with lens implant 1998  Right      C Section 1994      Cholecystectomy/appendectomy @ age 25      D&C x2 1980's      D&C 2008  hysteroscopy, endometrial hyperplasia, 2009      Cervical Spinal Stenosis surgery x2 (01/2002, 7/2002)      Tonsillectomy as a child      Endometrial biopsy 12/02/09      H/O laser iridotomy  left eye, 2016      H/O colonoscopy  1998      S/P total abdominal hysterectomy and bilateral salpingo-oophorectomy      S/P appendectomy      S/P cholecystectomy          FAMILY HISTORY:  Family history of pancreatic cancer    Family history of bladder cancer    Family history of lung cancer (Grandparent)        Allergies    wool- rash, itch (Other)  latex (Rash)  adhesives (Rash)  Bactrim (Flushing)        MEDICATIONS  (STANDING):  acetaminophen     Tablet .. 650 milliGRAM(s) Oral every 6 hours  apixaban 5 milliGRAM(s) Oral every 12 hours  ascorbic acid 500 milliGRAM(s) Oral daily  aspirin enteric coated 81 milliGRAM(s) Oral daily  atorvastatin 80 milliGRAM(s) Oral at bedtime  budesonide 160 MICROgram(s)/formoterol 4.5 MICROgram(s) Inhaler 2 Puff(s) Inhalation two times a day  cadexomer iodine 0.9% Gel 1 Application(s) Topical daily  dextrose 5%. 1000 milliLiter(s) (50 mL/Hr) IV Continuous <Continuous>  dextrose 5%. 1000 milliLiter(s) (100 mL/Hr) IV Continuous <Continuous>  dextrose 50% Injectable 12.5 Gram(s) IV Push once  dextrose 50% Injectable 25 Gram(s) IV Push once  dextrose 50% Injectable 25 Gram(s) IV Push once  furosemide    Tablet 40 milliGRAM(s) Oral daily  glucagon  Injectable 1 milliGRAM(s) IntraMuscular once  insulin glargine Injectable (LANTUS) 60 Unit(s) SubCutaneous <User Schedule>  insulin lispro (ADMELOG) corrective regimen sliding scale   SubCutaneous at bedtime  insulin lispro (ADMELOG) corrective regimen sliding scale   SubCutaneous three times a day before meals  loratadine 10 milliGRAM(s) Oral daily  Nephro-dipesh 1 Tablet(s) Oral daily  nitroglycerin  0.2% Ointment Compound 1 Application(s) Rectal two times a day  oxyCODONE  ER Tablet 60 milliGRAM(s) Oral every 12 hours  polyethylene glycol 3350 17 Gram(s) Oral two times a day  senna 2 Tablet(s) Oral at bedtime    MEDICATIONS  (PRN):  albuterol    90 MICROgram(s) HFA Inhaler 2 Puff(s) Inhalation every 6 hours PRN Shortness of Breath and/or Wheezing  artificial tears (preservative free) Ophthalmic Solution 1 Drop(s) Both EYES four times a day PRN Dry Eyes  bisacodyl Suppository 10 milliGRAM(s) Rectal daily PRN Constipation  dextrose Oral Gel 15 Gram(s) Oral once PRN Blood Glucose LESS THAN 70 milliGRAM(s)/deciliter  ondansetron Injectable 4 milliGRAM(s) IV Push every 8 hours PRN Nausea and/or Vomiting  psyllium Powder 1 Packet(s) Oral daily PRN constipation      Vital Signs:  T(C): 36.3 (09-22-23 @ 04:47)  HR: 71 (09-22-23 @ 04:47)  BP: 114/67 (09-22-23 @ 04:47)  RR: 18 (09-22-23 @ 04:47)  SpO2: 97% (09-22-23 @ 04:47)    Pertinent labs/radiology:  Reviewed        09-21    139  |  96  |  53<H>  ----------------------------<  280<H>  4.8   |  30  |  1.92<H>    Ca    9.9      21 Sep 2023 06:45

## 2023-09-23 DIAGNOSIS — N39.0 URINARY TRACT INFECTION, SITE NOT SPECIFIED: ICD-10-CM

## 2023-09-23 LAB
APPEARANCE UR: ABNORMAL
BACTERIA # UR AUTO: ABNORMAL
BILIRUB UR-MCNC: NEGATIVE — SIGNIFICANT CHANGE UP
COLOR SPEC: YELLOW — SIGNIFICANT CHANGE UP
DIFF PNL FLD: ABNORMAL
EPI CELLS # UR: 0 /HPF — SIGNIFICANT CHANGE UP
GLUCOSE BLDC GLUCOMTR-MCNC: 110 MG/DL — HIGH (ref 70–99)
GLUCOSE BLDC GLUCOMTR-MCNC: 130 MG/DL — HIGH (ref 70–99)
GLUCOSE BLDC GLUCOMTR-MCNC: 132 MG/DL — HIGH (ref 70–99)
GLUCOSE BLDC GLUCOMTR-MCNC: 138 MG/DL — HIGH (ref 70–99)
GLUCOSE BLDC GLUCOMTR-MCNC: 159 MG/DL — HIGH (ref 70–99)
GLUCOSE UR QL: NEGATIVE — SIGNIFICANT CHANGE UP
HYALINE CASTS # UR AUTO: 1 /LPF — SIGNIFICANT CHANGE UP (ref 0–2)
KETONES UR-MCNC: NEGATIVE — SIGNIFICANT CHANGE UP
LEUKOCYTE ESTERASE UR-ACNC: ABNORMAL
NITRITE UR-MCNC: NEGATIVE — SIGNIFICANT CHANGE UP
PH UR: 8.5 — HIGH (ref 5–8)
PROT UR-MCNC: ABNORMAL
RBC CASTS # UR COMP ASSIST: 2 /HPF — SIGNIFICANT CHANGE UP (ref 0–4)
SP GR SPEC: 1.01 — SIGNIFICANT CHANGE UP (ref 1.01–1.02)
UROBILINOGEN FLD QL: NEGATIVE — SIGNIFICANT CHANGE UP
WBC UR QL: 52 /HPF — HIGH (ref 0–5)

## 2023-09-23 PROCEDURE — 99232 SBSQ HOSP IP/OBS MODERATE 35: CPT

## 2023-09-23 RX ORDER — INSULIN GLARGINE 100 [IU]/ML
30 INJECTION, SOLUTION SUBCUTANEOUS ONCE
Refills: 0 | Status: COMPLETED | OUTPATIENT
Start: 2023-09-23 | End: 2023-09-23

## 2023-09-23 RX ORDER — CEFTRIAXONE 500 MG/1
1000 INJECTION, POWDER, FOR SOLUTION INTRAMUSCULAR; INTRAVENOUS EVERY 24 HOURS
Refills: 0 | Status: DISCONTINUED | OUTPATIENT
Start: 2023-09-23 | End: 2023-09-24

## 2023-09-23 RX ORDER — CEFTRIAXONE 500 MG/1
1000 INJECTION, POWDER, FOR SOLUTION INTRAMUSCULAR; INTRAVENOUS ONCE
Refills: 0 | Status: DISCONTINUED | OUTPATIENT
Start: 2023-09-23 | End: 2023-09-23

## 2023-09-23 RX ORDER — INSULIN GLARGINE 100 [IU]/ML
40 INJECTION, SOLUTION SUBCUTANEOUS ONCE
Refills: 0 | Status: DISCONTINUED | OUTPATIENT
Start: 2023-09-23 | End: 2023-09-23

## 2023-09-23 RX ORDER — INSULIN GLARGINE 100 [IU]/ML
30 INJECTION, SOLUTION SUBCUTANEOUS EVERY MORNING
Refills: 0 | Status: DISCONTINUED | OUTPATIENT
Start: 2023-09-24 | End: 2023-09-29

## 2023-09-23 RX ORDER — CEFTRIAXONE 500 MG/1
INJECTION, POWDER, FOR SOLUTION INTRAMUSCULAR; INTRAVENOUS
Refills: 0 | Status: DISCONTINUED | OUTPATIENT
Start: 2023-09-23 | End: 2023-09-23

## 2023-09-23 RX ORDER — INSULIN LISPRO 100/ML
VIAL (ML) SUBCUTANEOUS
Refills: 0 | Status: DISCONTINUED | OUTPATIENT
Start: 2023-09-23 | End: 2023-09-29

## 2023-09-23 RX ADMIN — Medication 650 MILLIGRAM(S): at 13:33

## 2023-09-23 RX ADMIN — Medication 650 MILLIGRAM(S): at 14:30

## 2023-09-23 RX ADMIN — OXYCODONE HYDROCHLORIDE 60 MILLIGRAM(S): 5 TABLET ORAL at 18:03

## 2023-09-23 RX ADMIN — Medication 1 TABLET(S): at 13:34

## 2023-09-23 RX ADMIN — Medication 1 APPLICATION(S): at 18:10

## 2023-09-23 RX ADMIN — Medication 40 MILLIGRAM(S): at 06:32

## 2023-09-23 RX ADMIN — Medication 650 MILLIGRAM(S): at 18:02

## 2023-09-23 RX ADMIN — ATORVASTATIN CALCIUM 80 MILLIGRAM(S): 80 TABLET, FILM COATED ORAL at 21:20

## 2023-09-23 RX ADMIN — INSULIN GLARGINE 60 UNIT(S): 100 INJECTION, SOLUTION SUBCUTANEOUS at 21:20

## 2023-09-23 RX ADMIN — Medication 20 UNIT(S): at 13:18

## 2023-09-23 RX ADMIN — Medication 650 MILLIGRAM(S): at 07:30

## 2023-09-23 RX ADMIN — POLYETHYLENE GLYCOL 3350 17 GRAM(S): 17 POWDER, FOR SOLUTION ORAL at 06:29

## 2023-09-23 RX ADMIN — OXYCODONE HYDROCHLORIDE 60 MILLIGRAM(S): 5 TABLET ORAL at 06:31

## 2023-09-23 RX ADMIN — Medication 1 APPLICATION(S): at 06:32

## 2023-09-23 RX ADMIN — Medication 650 MILLIGRAM(S): at 00:33

## 2023-09-23 RX ADMIN — Medication 650 MILLIGRAM(S): at 19:02

## 2023-09-23 RX ADMIN — APIXABAN 5 MILLIGRAM(S): 2.5 TABLET, FILM COATED ORAL at 06:28

## 2023-09-23 RX ADMIN — ONDANSETRON 4 MILLIGRAM(S): 8 TABLET, FILM COATED ORAL at 21:20

## 2023-09-23 RX ADMIN — INSULIN GLARGINE 30 UNIT(S): 100 INJECTION, SOLUTION SUBCUTANEOUS at 16:26

## 2023-09-23 RX ADMIN — SENNA PLUS 2 TABLET(S): 8.6 TABLET ORAL at 21:21

## 2023-09-23 RX ADMIN — BUDESONIDE AND FORMOTEROL FUMARATE DIHYDRATE 2 PUFF(S): 160; 4.5 AEROSOL RESPIRATORY (INHALATION) at 06:32

## 2023-09-23 RX ADMIN — OXYCODONE HYDROCHLORIDE 60 MILLIGRAM(S): 5 TABLET ORAL at 19:03

## 2023-09-23 RX ADMIN — Medication 20 UNIT(S): at 08:55

## 2023-09-23 RX ADMIN — Medication 650 MILLIGRAM(S): at 23:29

## 2023-09-23 RX ADMIN — CEFTRIAXONE 100 MILLIGRAM(S): 500 INJECTION, POWDER, FOR SOLUTION INTRAMUSCULAR; INTRAVENOUS at 20:48

## 2023-09-23 RX ADMIN — Medication 650 MILLIGRAM(S): at 06:32

## 2023-09-23 RX ADMIN — OXYCODONE HYDROCHLORIDE 5 MILLIGRAM(S): 5 TABLET ORAL at 21:50

## 2023-09-23 RX ADMIN — OXYCODONE HYDROCHLORIDE 5 MILLIGRAM(S): 5 TABLET ORAL at 11:31

## 2023-09-23 RX ADMIN — OXYCODONE HYDROCHLORIDE 5 MILLIGRAM(S): 5 TABLET ORAL at 21:20

## 2023-09-23 RX ADMIN — Medication 500 MILLIGRAM(S): at 13:33

## 2023-09-23 RX ADMIN — Medication 20 UNIT(S): at 18:11

## 2023-09-23 RX ADMIN — OXYCODONE HYDROCHLORIDE 5 MILLIGRAM(S): 5 TABLET ORAL at 10:23

## 2023-09-23 RX ADMIN — BUDESONIDE AND FORMOTEROL FUMARATE DIHYDRATE 2 PUFF(S): 160; 4.5 AEROSOL RESPIRATORY (INHALATION) at 18:06

## 2023-09-23 RX ADMIN — APIXABAN 5 MILLIGRAM(S): 2.5 TABLET, FILM COATED ORAL at 18:06

## 2023-09-23 RX ADMIN — Medication 81 MILLIGRAM(S): at 13:33

## 2023-09-23 RX ADMIN — LIDOCAINE 1 APPLICATION(S): 4 CREAM TOPICAL at 18:09

## 2023-09-23 RX ADMIN — Medication 1 APPLICATION(S): at 14:04

## 2023-09-23 RX ADMIN — OXYCODONE HYDROCHLORIDE 60 MILLIGRAM(S): 5 TABLET ORAL at 07:30

## 2023-09-23 RX ADMIN — LORATADINE 10 MILLIGRAM(S): 10 TABLET ORAL at 13:33

## 2023-09-23 NOTE — PROGRESS NOTE ADULT - SUBJECTIVE AND OBJECTIVE BOX
[facility] Division of Hospital Medicine  Andrea Munoz DO  Pager (M-F, 8A-5P):  MS Teams PREFERRED  Other Times:  [phone]      SUBJECTIVE / OVERNIGHT EVENTS:  Pt seen at the bedside, pain is not as severe as yesterday, but still uncontrolled, reports has burning urine.     MEDICATIONS  (STANDING):  acetaminophen     Tablet .. 650 milliGRAM(s) Oral every 6 hours  apixaban 5 milliGRAM(s) Oral every 12 hours  ascorbic acid 500 milliGRAM(s) Oral daily  aspirin enteric coated 81 milliGRAM(s) Oral daily  atorvastatin 80 milliGRAM(s) Oral at bedtime  budesonide 160 MICROgram(s)/formoterol 4.5 MICROgram(s) Inhaler 2 Puff(s) Inhalation two times a day  cadexomer iodine 0.9% Gel 1 Application(s) Topical daily  dextrose 5%. 1000 milliLiter(s) (100 mL/Hr) IV Continuous <Continuous>  dextrose 5%. 1000 milliLiter(s) (50 mL/Hr) IV Continuous <Continuous>  dextrose 50% Injectable 25 Gram(s) IV Push once  dextrose 50% Injectable 25 Gram(s) IV Push once  dextrose 50% Injectable 12.5 Gram(s) IV Push once  furosemide    Tablet 40 milliGRAM(s) Oral daily  glucagon  Injectable 1 milliGRAM(s) IntraMuscular once  insulin glargine Injectable (LANTUS) 30 Unit(s) SubCutaneous once  insulin glargine Injectable (LANTUS) 60 Unit(s) SubCutaneous at bedtime  insulin lispro (ADMELOG) corrective regimen sliding scale   SubCutaneous at bedtime  insulin lispro (ADMELOG) corrective regimen sliding scale   SubCutaneous three times a day before meals  insulin lispro Injectable (ADMELOG) 20 Unit(s) SubCutaneous three times a day before meals  lidocaine 2% Gel 1 Application(s) Topical daily  loratadine 10 milliGRAM(s) Oral daily  Nephro-dipesh 1 Tablet(s) Oral daily  nitroglycerin  0.2% Ointment Compound 1 Application(s) Rectal two times a day  oxyCODONE  ER Tablet 60 milliGRAM(s) Oral every 12 hours  polyethylene glycol 3350 17 Gram(s) Oral two times a day  senna 2 Tablet(s) Oral at bedtime    MEDICATIONS  (PRN):  albuterol    90 MICROgram(s) HFA Inhaler 2 Puff(s) Inhalation every 6 hours PRN Shortness of Breath and/or Wheezing  artificial tears (preservative free) Ophthalmic Solution 1 Drop(s) Both EYES four times a day PRN Dry Eyes  bisacodyl Suppository 10 milliGRAM(s) Rectal daily PRN Constipation  dextrose Oral Gel 15 Gram(s) Oral once PRN Blood Glucose LESS THAN 70 milliGRAM(s)/deciliter  ondansetron Injectable 4 milliGRAM(s) IV Push every 8 hours PRN Nausea and/or Vomiting  oxyCODONE    IR 5 milliGRAM(s) Oral every 6 hours PRN Severe Pain (7 - 10)  psyllium Powder 1 Packet(s) Oral daily PRN constipation      I&O's Summary    22 Sep 2023 07:01  -  23 Sep 2023 07:00  --------------------------------------------------------  IN: 0 mL / OUT: 1200 mL / NET: -1200 mL        PHYSICAL EXAM:  Vital Signs Last 24 Hrs  T(C): 36.5 (23 Sep 2023 11:07), Max: 36.9 (22 Sep 2023 21:12)  T(F): 97.7 (23 Sep 2023 11:07), Max: 98.4 (22 Sep 2023 21:12)  HR: 63 (23 Sep 2023 11:07) (61 - 73)  BP: 121/60 (23 Sep 2023 11:07) (121/60 - 142/65)  BP(mean): --  RR: 18 (23 Sep 2023 11:07) (18 - 18)  SpO2: 95% (23 Sep 2023 11:07) (95% - 100%)    Parameters below as of 23 Sep 2023 11:07  Patient On (Oxygen Delivery Method): nasal cannula  O2 Flow (L/min): 5        CONSTITUTIONAL: Well-groomed, in no apparent distress  RESPIRATORY: Breathing comfortably; no dullness to percussion; lungs CTA without wheeze/rhonchi/rales though limited d/t body habitus  CARDIOVASCULAR: Difficult examination d/t body habitus. +S1S2, RRR, no M/G/R; pedal pulses full and symmetric; no lower extremity edema  GASTROINTESTINAL: Surgical scars present. No palpable masses or tenderness, +BS throughout, no rebound/guarding; no hepatosplenomegaly; no hernia palpated  LYMPHATIC: No cervical LAD or tenderness  SKIN: Stasis dermatitis in LE. No rashes or ulcers noted  NEUROLOGIC: ; sensation intact in LEs b/l to light touch  PSYCHIATRIC: A&Ox3; mood and affect appropriate; appropriate insight and judgment    LABS:                Urinalysis Basic - ( 23 Sep 2023 12:30 )    Color: Yellow / Appearance: Slightly Turbid / S.013 / pH: x  Gluc: x / Ketone: Negative  / Bili: Negative / Urobili: Negative   Blood: x / Protein: 30 mg/dL / Nitrite: Negative   Leuk Esterase: Large / RBC: 2 /hpf / WBC 52 /HPF   Sq Epi: x / Non Sq Epi: x / Bacteria: Many          RADIOLOGY & ADDITIONAL TESTS:  Results Reviewed: Urinalysis personally reviewed by me, Many bacteria, large leukocyte esterases.   Imaging Personally Reviewed:  Electrocardiogram Personally Reviewed:    COORDINATION OF CARE:  Care Discussed with Consultants/Other Providers [Y/N]:  Prior or Outpatient Records Reviewed [Y/N]:

## 2023-09-23 NOTE — PROGRESS NOTE ADULT - ASSESSMENT
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 Endocrine following for type 2 DM with hyperglycemia management. BG Goal 100-180mg/dl . BGs today at goal with fasting . patient received Lantus 120 units total in last 24 hours. Risk of hypoglycemia given CKD and high dose of Lantus given.      Uncontrolled type 2 diabetes mellitus with hyperglycemia.   ·  Recommendation: Diabetes Education and Nutrition Eval  Based on prior hospitalization insulin requirements recommend:  Monitor on Lantus 60 units daily  Start Admelog 20 units qac  Mod correction scale qac + bedtime  Goal glucose 100-180  Outpt. endo follow-up  Outpt. optho, podiatry, nephrology  Plan to d/c on basal bolus.     Problem/Recommendation - 2:  ·  Problem: HTN (hypertension).   ·  Recommendation: Goal BP <130/80 on lisinopril at home.     Problem/Recommendation - 3:  ·  Problem: HLD (hyperlipidemia).   ·  Recommendation: c/w statin        Bebeto Strange D.O  [phone].     Problem/Recommendation - 4:  ·  Problem: Adrenal nodule.      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 Endocrine following for type 2 DM with hyperglycemia management. BG Goal 100-180mg/dl . BGs today at goal with fasting . patient received Lantus 120 units total in last 24 hours. Risk of hypoglycemia given CKD and high dose of Lantus given.     Home Regimen: Tujeo 86 units in am and 80 units at HS, Novolog 23 units TID  A1C 7.2  No endocrinologist

## 2023-09-23 NOTE — PROGRESS NOTE ADULT - SUBJECTIVE AND OBJECTIVE BOX
Seen earlier today     Chief Complaint: Diabetes Mellitus follow up    INTERVAL HX: Tolerating POs with variable oral intake due to intermittent nausea. Noted Lantus 60 units given at 8 am and another 60 units of Lantus given at HS, so patient received 120 units in last 24 hours. BGs today in 100s with fasting .       Review of Systems:  General: As above  GI: + intermittent nausea, no vomiting  Endocrine: no  S&Sx of hypoglycemia    Allergies    wool- rash, itch (Other)  latex (Rash)  adhesives (Rash)  Bactrim (Flushing)    Intolerances      MEDICATIONS  (STANDING):  atorvastatin 80 milliGRAM(s) Oral at bedtime  budesonide 160 MICROgram(s)/formoterol 4.5 MICROgram(s) Inhaler 2 Puff(s) Inhalation two times a day  cefTRIAXone   IVPB 1000 milliGRAM(s) IV Intermittent every 24 hours  dextrose 5%. 1000 milliLiter(s) (100 mL/Hr) IV Continuous <Continuous>  dextrose 5%. 1000 milliLiter(s) (50 mL/Hr) IV Continuous <Continuous>  dextrose 50% Injectable 25 Gram(s) IV Push once  dextrose 50% Injectable 12.5 Gram(s) IV Push once  dextrose 50% Injectable 25 Gram(s) IV Push once  glucagon  Injectable 1 milliGRAM(s) IntraMuscular once  insulin glargine Injectable (LANTUS) 60 Unit(s) SubCutaneous at bedtime  insulin lispro (ADMELOG) corrective regimen sliding scale   SubCutaneous at bedtime  insulin lispro (ADMELOG) corrective regimen sliding scale   SubCutaneous three times a day before meals  insulin lispro Injectable (ADMELOG) 20 Unit(s) SubCutaneous three times a day before meals    atorvastatin   80 milliGRAM(s) Oral (09-22-23 @ 22:00)    insulin glargine Injectable (LANTUS)   30 Unit(s) SubCutaneous (09-23-23 @ 16:26)    insulin glargine Injectable (LANTUS)   60 Unit(s) SubCutaneous (09-22-23 @ 22:01)    insulin lispro (ADMELOG) corrective regimen sliding scale   1 Unit(s) SubCutaneous (09-22-23 @ 22:01)    insulin lispro Injectable (ADMELOG)   20 Unit(s) SubCutaneous (09-23-23 @ 18:11)   20 Unit(s) SubCutaneous (09-23-23 @ 13:18)   20 Unit(s) SubCutaneous (09-23-23 @ 08:55)        PHYSICAL EXAM:  VITALS: T(C): 36.5 (09-23-23 @ 11:07)  T(F): 97.7 (09-23-23 @ 11:07), Max: 98.4 (09-22-23 @ 21:12)  HR: 63 (09-23-23 @ 11:07) (61 - 73)  BP: 121/60 (09-23-23 @ 11:07) (121/60 - 142/65)  RR:  (18 - 18)  SpO2:  (95% - 100%)  Wt(kg): --  GENERAL: female laying in bed, in NAD  Respiratory: Respirations unlabored   Extremities: Warm, no edema  NEURO: Alert , appropriate     LABS:  POCT Blood Glucose.: 132 mg/dL (09-23-23 @ 17:39)  POCT Blood Glucose.: 130 mg/dL (09-23-23 @ 16:24)  POCT Blood Glucose.: 110 mg/dL (09-23-23 @ 12:19)  POCT Blood Glucose.: 138 mg/dL (09-23-23 @ 08:25)  POCT Blood Glucose.: 258 mg/dL (09-22-23 @ 21:53)  POCT Blood Glucose.: 377 mg/dL (09-22-23 @ 16:52)  POCT Blood Glucose.: 391 mg/dL (09-22-23 @ 12:05)  POCT Blood Glucose.: 268 mg/dL (09-22-23 @ 08:18)  POCT Blood Glucose.: 358 mg/dL (09-21-23 @ 21:26)  POCT Blood Glucose.: 384 mg/dL (09-21-23 @ 17:00)  POCT Blood Glucose.: 358 mg/dL (09-21-23 @ 12:16)  POCT Blood Glucose.: 273 mg/dL (09-21-23 @ 08:48)  POCT Blood Glucose.: 352 mg/dL (09-20-23 @ 21:59)                Thyroid Function Tests:  09-17 @ 16:14 TSH 3.50 FreeT4 -- T3 71 Anti TPO -- Anti Thyroglobulin Ab -- TSI --      A1C with Estimated Average Glucose Result: 7.2 % (09-17-23 @ 16:13)  A1C with Estimated Average Glucose Result: 6.5 % (08-31-23 @ 10:25)    Estimated Average Glucose: 160 mg/dL (09-17-23 @ 16:13)  Estimated Average Glucose: 140 mg/dL (08-31-23 @ 10:25)        Diet, Consistent Carbohydrate/No Snacks:   DASH/TLC Sodium & Cholesterol Restricted (DASH)  Edward(7 Gm Arginine/7 Gm Glut/1.2 Gm HMB     Qty per Day:  2 (09-22-23 @ 17:47) [Active]

## 2023-09-23 NOTE — PROGRESS NOTE ADULT - PROBLEM SELECTOR PLAN 2
Patient with complaint of burning urination while hospitalized, Repeat UA sent positive for many bacteria and large leukocyte esterases  START IV Ceftriaxone  Urine culture collected.   PT allergic to Bactrim.

## 2023-09-23 NOTE — PROGRESS NOTE ADULT - ASSESSMENT
69yo F w/ PMH of HTN, HLD, DM2, HFpEF, CKD III, DVT, Uterine CA and recent hospitalization at [facility] from 08/30-09/08 for dyspnea thought likely 2/2 PE and severe pulm HTN, now on Eliquis, presents with rectal pain, had bowel movements today, will continue to monitor, seen by colorectal surgery, and pain management. Endocrine following for sugars.

## 2023-09-24 LAB
GLUCOSE BLDC GLUCOMTR-MCNC: 104 MG/DL — HIGH (ref 70–99)
GLUCOSE BLDC GLUCOMTR-MCNC: 108 MG/DL — HIGH (ref 70–99)
GLUCOSE BLDC GLUCOMTR-MCNC: 130 MG/DL — HIGH (ref 70–99)
GLUCOSE BLDC GLUCOMTR-MCNC: 132 MG/DL — HIGH (ref 70–99)
GLUCOSE BLDC GLUCOMTR-MCNC: 137 MG/DL — HIGH (ref 70–99)
GLUCOSE BLDC GLUCOMTR-MCNC: 139 MG/DL — HIGH (ref 70–99)
GLUCOSE BLDC GLUCOMTR-MCNC: 181 MG/DL — HIGH (ref 70–99)

## 2023-09-24 PROCEDURE — 99232 SBSQ HOSP IP/OBS MODERATE 35: CPT

## 2023-09-24 PROCEDURE — 93010 ELECTROCARDIOGRAM REPORT: CPT

## 2023-09-24 RX ORDER — INSULIN GLARGINE 100 [IU]/ML
30 INJECTION, SOLUTION SUBCUTANEOUS ONCE
Refills: 0 | Status: COMPLETED | OUTPATIENT
Start: 2023-09-24 | End: 2023-09-24

## 2023-09-24 RX ORDER — NITROGLYCERIN 6.5 MG
1 CAPSULE, EXTENDED RELEASE ORAL ONCE
Refills: 0 | Status: COMPLETED | OUTPATIENT
Start: 2023-09-24 | End: 2023-09-24

## 2023-09-24 RX ORDER — CEFTRIAXONE 500 MG/1
1000 INJECTION, POWDER, FOR SOLUTION INTRAMUSCULAR; INTRAVENOUS EVERY 24 HOURS
Refills: 0 | Status: DISCONTINUED | OUTPATIENT
Start: 2023-09-25 | End: 2023-09-25

## 2023-09-24 RX ORDER — CEFTRIAXONE 500 MG/1
1000 INJECTION, POWDER, FOR SOLUTION INTRAMUSCULAR; INTRAVENOUS ONCE
Refills: 0 | Status: COMPLETED | OUTPATIENT
Start: 2023-09-24 | End: 2023-09-24

## 2023-09-24 RX ORDER — CEFTRIAXONE 500 MG/1
INJECTION, POWDER, FOR SOLUTION INTRAMUSCULAR; INTRAVENOUS
Refills: 0 | Status: DISCONTINUED | OUTPATIENT
Start: 2023-09-24 | End: 2023-09-25

## 2023-09-24 RX ADMIN — OXYCODONE HYDROCHLORIDE 5 MILLIGRAM(S): 5 TABLET ORAL at 09:12

## 2023-09-24 RX ADMIN — Medication 81 MILLIGRAM(S): at 13:26

## 2023-09-24 RX ADMIN — Medication 20 UNIT(S): at 18:06

## 2023-09-24 RX ADMIN — OXYCODONE HYDROCHLORIDE 60 MILLIGRAM(S): 5 TABLET ORAL at 05:52

## 2023-09-24 RX ADMIN — INSULIN GLARGINE 60 UNIT(S): 100 INJECTION, SOLUTION SUBCUTANEOUS at 22:39

## 2023-09-24 RX ADMIN — SENNA PLUS 2 TABLET(S): 8.6 TABLET ORAL at 21:18

## 2023-09-24 RX ADMIN — Medication 1 APPLICATION(S): at 06:00

## 2023-09-24 RX ADMIN — POLYETHYLENE GLYCOL 3350 17 GRAM(S): 17 POWDER, FOR SOLUTION ORAL at 05:24

## 2023-09-24 RX ADMIN — APIXABAN 5 MILLIGRAM(S): 2.5 TABLET, FILM COATED ORAL at 05:22

## 2023-09-24 RX ADMIN — Medication 650 MILLIGRAM(S): at 06:38

## 2023-09-24 RX ADMIN — Medication 1 TABLET(S): at 13:26

## 2023-09-24 RX ADMIN — OXYCODONE HYDROCHLORIDE 5 MILLIGRAM(S): 5 TABLET ORAL at 22:00

## 2023-09-24 RX ADMIN — Medication 40 MILLIGRAM(S): at 05:22

## 2023-09-24 RX ADMIN — BUDESONIDE AND FORMOTEROL FUMARATE DIHYDRATE 2 PUFF(S): 160; 4.5 AEROSOL RESPIRATORY (INHALATION) at 18:15

## 2023-09-24 RX ADMIN — INSULIN GLARGINE 30 UNIT(S): 100 INJECTION, SOLUTION SUBCUTANEOUS at 11:00

## 2023-09-24 RX ADMIN — POLYETHYLENE GLYCOL 3350 17 GRAM(S): 17 POWDER, FOR SOLUTION ORAL at 18:14

## 2023-09-24 RX ADMIN — ATORVASTATIN CALCIUM 80 MILLIGRAM(S): 80 TABLET, FILM COATED ORAL at 21:18

## 2023-09-24 RX ADMIN — Medication 1 APPLICATION(S): at 18:13

## 2023-09-24 RX ADMIN — LORATADINE 10 MILLIGRAM(S): 10 TABLET ORAL at 13:26

## 2023-09-24 RX ADMIN — ONDANSETRON 4 MILLIGRAM(S): 8 TABLET, FILM COATED ORAL at 09:12

## 2023-09-24 RX ADMIN — OXYCODONE HYDROCHLORIDE 60 MILLIGRAM(S): 5 TABLET ORAL at 18:14

## 2023-09-24 RX ADMIN — Medication 650 MILLIGRAM(S): at 18:13

## 2023-09-24 RX ADMIN — Medication 650 MILLIGRAM(S): at 23:10

## 2023-09-24 RX ADMIN — LIDOCAINE 1 APPLICATION(S): 4 CREAM TOPICAL at 13:25

## 2023-09-24 RX ADMIN — Medication 650 MILLIGRAM(S): at 13:24

## 2023-09-24 RX ADMIN — Medication 20 UNIT(S): at 09:09

## 2023-09-24 RX ADMIN — Medication 650 MILLIGRAM(S): at 07:07

## 2023-09-24 RX ADMIN — Medication 650 MILLIGRAM(S): at 22:40

## 2023-09-24 RX ADMIN — Medication 1 APPLICATION(S): at 18:15

## 2023-09-24 RX ADMIN — BUDESONIDE AND FORMOTEROL FUMARATE DIHYDRATE 2 PUFF(S): 160; 4.5 AEROSOL RESPIRATORY (INHALATION) at 05:23

## 2023-09-24 RX ADMIN — APIXABAN 5 MILLIGRAM(S): 2.5 TABLET, FILM COATED ORAL at 18:14

## 2023-09-24 RX ADMIN — Medication 500 MILLIGRAM(S): at 13:27

## 2023-09-24 RX ADMIN — Medication 20 UNIT(S): at 13:22

## 2023-09-24 RX ADMIN — OXYCODONE HYDROCHLORIDE 5 MILLIGRAM(S): 5 TABLET ORAL at 21:18

## 2023-09-24 RX ADMIN — CEFTRIAXONE 1000 MILLIGRAM(S): 500 INJECTION, POWDER, FOR SOLUTION INTRAMUSCULAR; INTRAVENOUS at 09:13

## 2023-09-24 RX ADMIN — OXYCODONE HYDROCHLORIDE 60 MILLIGRAM(S): 5 TABLET ORAL at 05:22

## 2023-09-24 RX ADMIN — Medication 1 APPLICATION(S): at 10:50

## 2023-09-24 NOTE — PROGRESS NOTE ADULT - PROBLEM SELECTOR PLAN 2
Patient with complaint of burning urination while hospitalized, Repeat UA sent positive for many bacteria and large leukocyte esterases  Continue  IV Ceftriaxone  Urine culture collected. Pending  PT allergic to Bactrim

## 2023-09-24 NOTE — PROGRESS NOTE ADULT - SUBJECTIVE AND OBJECTIVE BOX
[facility] Division of Hospital Medicine  Andrea Munoz DO  Pager (M-F, 8A-5P):  MS Teams PREFERRED        SUBJECTIVE / OVERNIGHT EVENTS:      MEDICATIONS  (STANDING):  acetaminophen     Tablet .. 650 milliGRAM(s) Oral every 6 hours  apixaban 5 milliGRAM(s) Oral every 12 hours  ascorbic acid 500 milliGRAM(s) Oral daily  aspirin enteric coated 81 milliGRAM(s) Oral daily  atorvastatin 80 milliGRAM(s) Oral at bedtime  budesonide 160 MICROgram(s)/formoterol 4.5 MICROgram(s) Inhaler 2 Puff(s) Inhalation two times a day  cadexomer iodine 0.9% Gel 1 Application(s) Topical daily  cefTRIAXone   IVPB      dextrose 5%. 1000 milliLiter(s) (50 mL/Hr) IV Continuous <Continuous>  dextrose 5%. 1000 milliLiter(s) (100 mL/Hr) IV Continuous <Continuous>  dextrose 50% Injectable 12.5 Gram(s) IV Push once  dextrose 50% Injectable 25 Gram(s) IV Push once  dextrose 50% Injectable 25 Gram(s) IV Push once  furosemide    Tablet 40 milliGRAM(s) Oral daily  glucagon  Injectable 1 milliGRAM(s) IntraMuscular once  insulin glargine Injectable (LANTUS) 30 Unit(s) SubCutaneous every morning  insulin glargine Injectable (LANTUS) 60 Unit(s) SubCutaneous at bedtime  insulin lispro (ADMELOG) corrective regimen sliding scale   SubCutaneous three times a day before meals  insulin lispro (ADMELOG) corrective regimen sliding scale   SubCutaneous <User Schedule>  insulin lispro Injectable (ADMELOG) 20 Unit(s) SubCutaneous three times a day before meals  lidocaine 2% Gel 1 Application(s) Topical daily  loratadine 10 milliGRAM(s) Oral daily  Nephro-dipesh 1 Tablet(s) Oral daily  nitroglycerin  0.2% Ointment Compound 1 Application(s) Rectal two times a day  oxyCODONE  ER Tablet 60 milliGRAM(s) Oral every 12 hours  polyethylene glycol 3350 17 Gram(s) Oral two times a day  senna 2 Tablet(s) Oral at bedtime    MEDICATIONS  (PRN):  albuterol    90 MICROgram(s) HFA Inhaler 2 Puff(s) Inhalation every 6 hours PRN Shortness of Breath and/or Wheezing  artificial tears (preservative free) Ophthalmic Solution 1 Drop(s) Both EYES four times a day PRN Dry Eyes  bisacodyl Suppository 10 milliGRAM(s) Rectal daily PRN Constipation  dextrose Oral Gel 15 Gram(s) Oral once PRN Blood Glucose LESS THAN 70 milliGRAM(s)/deciliter  ondansetron Injectable 4 milliGRAM(s) IV Push every 8 hours PRN Nausea and/or Vomiting  oxyCODONE    IR 5 milliGRAM(s) Oral every 6 hours PRN Severe Pain (7 - 10)  psyllium Powder 1 Packet(s) Oral daily PRN constipation      I&O's Summary    23 Sep 2023 07:01  -  24 Sep 2023 07:00  --------------------------------------------------------  IN: 120 mL / OUT: 600 mL / NET: -480 mL        PHYSICAL EXAM:  Vital Signs Last 24 Hrs  T(C): 36.6 (24 Sep 2023 12:39), Max: 36.8 (24 Sep 2023 04:25)  T(F): 97.9 (24 Sep 2023 12:39), Max: 98.2 (24 Sep 2023 04:25)  HR: 74 (24 Sep 2023 12:39) (66 - 76)  BP: 97/60 (24 Sep 2023 12:39) (97/60 - 134/66)  BP(mean): --  RR: 17 (24 Sep 2023 12:39) (17 - 18)  SpO2: 96% (24 Sep 2023 12:39) (96% - 98%)    Parameters below as of 24 Sep 2023 04:25  Patient On (Oxygen Delivery Method): nasal cannula        CONSTITUTIONAL: Well-groomed, in no apparent distress  RESPIRATORY: Breathing comfortably; no dullness to percussion; lungs CTA without wheeze/rhonchi/rales though limited d/t body habitus  CARDIOVASCULAR: Difficult examination d/t body habitus. +S1S2, RRR, no M/G/R; pedal pulses full and symmetric; no lower extremity edema  GASTROINTESTINAL: Surgical scars present. No palpable masses or tenderness, +BS throughout, no rebound/guarding; no hepatosplenomegaly; no hernia palpated  LYMPHATIC: No cervical LAD or tenderness  SKIN: Stasis dermatitis in LE. No rashes or ulcers noted  NEUROLOGIC: ; sensation intact in LEs b/l to light touch  PSYCHIATRIC: A&Ox3; mood and affect appropriate; appropriate insight and judgment    LABS:                Urinalysis Basic - ( 23 Sep 2023 12:30 )    Color: Yellow / Appearance: Slightly Turbid / S.013 / pH: x  Gluc: x / Ketone: Negative  / Bili: Negative / Urobili: Negative   Blood: x / Protein: 30 mg/dL / Nitrite: Negative   Leuk Esterase: Large / RBC: 2 /hpf / WBC 52 /HPF   Sq Epi: x / Non Sq Epi: x / Bacteria: Many          RADIOLOGY & ADDITIONAL TESTS:  Results Reviewed:   Imaging Personally Reviewed:  Electrocardiogram Personally Reviewed:    COORDINATION OF CARE:  Care Discussed with Consultants/Other Providers [Y/N]:  Prior or Outpatient Records Reviewed [Y/N]:

## 2023-09-24 NOTE — CHART NOTE - NSCHARTNOTEFT_GEN_A_CORE
Notified by RN that patient complaining of chest pressure and mild dyspnea   Patient seen and examined at bedside   Patient reports pressure started a few minutes ago and is also experiencing mild shortness of breath   Patient reports the pain as a 5/10 and does not radiate   During evaluation patient reports that pain is subsiding   Vitals stable     Vital Signs Last 24 Hrs  T(C): 36.6 (24 Sep 2023 12:39), Max: 36.8 (24 Sep 2023 04:25)  T(F): 97.9 (24 Sep 2023 12:39), Max: 98.2 (24 Sep 2023 04:25)  HR: 74 (24 Sep 2023 12:39) (66 - 76)  BP: 97/60 (24 Sep 2023 12:39) (97/60 - 134/66)  BP(mean): --  RR: 17 (24 Sep 2023 12:39) (17 - 18)  SpO2: 96% (24 Sep 2023 12:39) (96% - 98%)    Parameters below as of 24 Sep 2023 12:39  Patient On (Oxygen Delivery Method): nasal cannula  O2 Flow (L/min): 4    CONSTITUTIONAL: No apparent distress  EYES: PERRLA and symmetric, EOMI, No conjunctival or scleral injection, non-icteric  RESP: No respiratory distress, no use of accessory muscles; CTA b/l, no WRR  CV: RRR, +S1S2, no MRG; no JVD; no peripheral edema    Assessment   68yoF PMH HTN, HLD, DM2, HFpEF, CKD III, DVT, Uterine CA, recent hospitalization at [facility] from 08/30-09/08 for dyspnea thought likely 2/2 PE and severe pHTN, now on Eliquis, p/w rectal pain. Patient now with mild chest pain and shortness of breath, although symptoms appear to be subsiding.    Plan   >12 lead EKG ordered   >Will hold off on further diagnostic test given that symptoms are subsiding   >Medical attending Dr. Munoz updated   >Will continue to monitor     Vanda Suero PA-C  Dept of Medicine

## 2023-09-24 NOTE — PROGRESS NOTE ADULT - ASSESSMENT
Encounter Date: 2023       History     Chief Complaint   Patient presents with    Abdominal Pain    Back Pain     X today. Pt missed dialysis today.      Presents with complaint of abdominal pain.  Patient denies nausea vomiting or diarrhea.  Patient reports pain all over.  She is thrashing around in the bed rubbing her stomach.  Her blood pressure is 180/112 she has not taken her blood pressure medication.  She is a dialysis patient.  She missed her 11 15 dialysis appointment at [facility] today.  She states she was hurting too badly.  She was seen yesterday at [facility] for abdominal pain and had a CT of the abdomen which was negative.  She was seen again today for the same complaint.  She had a full workup today at [facility].  She was told to follow up with her nephrologist.  She came here instead.  Patient is requesting pain medication.  She is known to [facility].  She is a frequent Flyer there.  She had abdominal x-rays today that were negative.  Patient is thrashing around in the bed.    Review of patient's allergies indicates:   Allergen Reactions    Penicillins Hives     Past Medical History:   Diagnosis Date    ESRD on hemodialysis 2022    Gastritis 2022    EGD was 21    Gastroparesis 2022    has not had Emptying study    Heart failure with preserved ejection fraction 2022    EF 55% on 3/22    History of supraventricular tachycardia     Hyperkalemia 2022    Hypertensive emergency 2022    Sickle cell trait 2022    Type 2 diabetes mellitus      Past Surgical History:   Procedure Laterality Date     SECTION      x 3    COLONOSCOPY      COLONOSCOPY N/A 2022    Procedure: COLONOSCOPY;  Surgeon: Jagdeep Cedeno MD;  Location: [facility];  Service: Endoscopy;  Laterality: N/A;    ESOPHAGOGASTRODUODENOSCOPY N/A 10/18/2019    Procedure: ESOPHAGOGASTRODUODENOSCOPY (EGD);  Surgeon: Gianluca Mendez MD;  Location: [facility];   Service: Endoscopy;  Laterality: N/A;    ESOPHAGOGASTRODUODENOSCOPY N/A 08/24/2022    Procedure: EGD (ESOPHAGOGASTRODUODENOSCOPY);  Surgeon: Micky Paredes III, MD;  Location: [facility] ENDO;  Service: Endoscopy;  Laterality: N/A;    ESOPHAGOGASTRODUODENOSCOPY N/A 12/5/2022    Procedure: EGD (ESOPHAGOGASTRODUODENOSCOPY);  Surgeon: Marcelo Zhong MD;  Location: [facility] ENDO;  Service: Endoscopy;  Laterality: N/A;    LAPAROSCOPIC CHOLECYSTECTOMY N/A 07/30/2022    Procedure: CHOLECYSTECTOMY, LAPAROSCOPIC;  Surgeon: Grey Perez MD;  Location: [facility] OR;  Service: General;  Laterality: N/A;    PLACEMENT OF DUAL-LUMEN VASCULAR CATHETER Left 07/12/2022    Procedure: INSERTION-CATHETER-JOSEPH;  Surgeon: Dionte Gan MD;  Location: [facility] OR;  Service: General;  Laterality: Left;    PLACEMENT OF DUAL-LUMEN VASCULAR CATHETER Right 07/26/2022    Procedure: INSERTION-CATHETER-Hemosplit;  Surgeon: Dionte Gan MD;  Location: [facility] OR;  Service: General;  Laterality: Right;     Family History   Problem Relation Age of Onset    Diabetes Mother     Diabetes Father      Social History     Tobacco Use    Smoking status: Never    Smokeless tobacco: Never   Substance Use Topics    Alcohol use: Not Currently    Drug use: No     Review of Systems   Constitutional:  Negative for fever.   Respiratory:  Negative for cough, shortness of breath and wheezing.    Cardiovascular:  Negative for chest pain, palpitations and leg swelling.   Gastrointestinal:  Positive for abdominal pain. Negative for diarrhea, nausea and vomiting.   Genitourinary:  Negative for dysuria.   Musculoskeletal:  Negative for back pain.   Skin:  Negative for rash.   Neurological:  Negative for weakness.     Physical Exam     Initial Vitals [04/22/23 1446]   BP Pulse Resp Temp SpO2   (!) 187/121 92 20 97.6 °F (36.4 °C) 96 %      MAP       --         Physical Exam    Constitutional: She appears distressed.   HENT:   Head: Normocephalic.   Mouth/Throat: Oropharynx is  clear and moist.   Eyes: Conjunctivae are normal.   Neck: Neck supple.   Normal range of motion.  Cardiovascular:  Normal rate, regular rhythm and normal heart sounds.           Pulmonary/Chest: Breath sounds normal. No respiratory distress. She has no wheezes. She has no rhonchi.   Abdominal: Abdomen is soft. Bowel sounds are normal. There is abdominal tenderness.   Generalized tenderness to light touch.  There is no distention.  She is difficult to assess as she is moving about in the bed.   Musculoskeletal:         General: Normal range of motion.      Cervical back: Normal range of motion and neck supple.     Neurological: She is alert and oriented to person, place, and time. No sensory deficit. GCS score is 15. GCS eye subscore is 4. GCS verbal subscore is 5. GCS motor subscore is 6.   Skin: Skin is warm and dry.   Psychiatric:   Patient is thrashing about in the bed.  I have had to remind her that in order for me to care for her I will need her to calm down so she can coherently speak with me       ED Course   Procedures  Labs Reviewed - No data to display       Imaging Results    None          Medications   mupirocin 2 % ointment (has no administration in time range)   LORazepam tablet 0.5 mg (0.5 mg Oral Given 4/22/23 1526)     Medical Decision Making:   Initial Assessment:   Presents with complaint of abdominal pain.  Generalized abdominal pain.  Onset 3 days.  Patient's blood pressure is elevated as she is not taken her blood pressure medication today she is end-stage renal disease.  She missed her dialysis this morning.  I asked her why.  She states that she was hurting too bad.  Yesterday she was at [facility] for abdominal pain had a CT of the abdomen.  It was negative.  She also went to [facility] again this morning.  At that time they did abdominal x-rays that were negative.  She is apparently well known to them as she is there frequently.  She missed her dialysis today and states that she  did because she was hurting too badly.  ED Management:  Labs from this morning have been viewed.  I spoke with Dr. Clark.  She requests admission for dialysis.  I have requested admission.  Patient was given 0.5 of lorazepam p.o. patient has been sent up to dialysis  Have discussed this patient with Dr. Bobo.  She agrees to admit.  .  I have discussed this patient with Dr. Clark as well as Dr. Tobni                        Clinical Impression:   Final diagnoses:  [E87.5] Hyperkalemia (Primary)  [N18.6] End stage renal disease        ED Disposition Condition    Observation Stable                Lynn Hope NP  04/22/23 2161     69yo F w/ PMH of HTN, HLD, DM2, HFpEF, CKD III, DVT, Uterine CA and recent hospitalization at [facility] from 08/30-09/08 for dyspnea thought likely 2/2 PE and severe pulm HTN, now on Eliquis, presents with rectal pain, had bowel movements today, will continue to monitor, seen by colorectal surgery, and pain management. Endocrine following for glucose control. Patient is recommended ROSE by PT but son and patient do not want to pursue. Dispo planning to continue as pain control is monitored. UTI seen being treated with Ceftriaxone.

## 2023-09-25 LAB
GLUCOSE BLDC GLUCOMTR-MCNC: 107 MG/DL — HIGH (ref 70–99)
GLUCOSE BLDC GLUCOMTR-MCNC: 108 MG/DL — HIGH (ref 70–99)
GLUCOSE BLDC GLUCOMTR-MCNC: 116 MG/DL — HIGH (ref 70–99)
GLUCOSE BLDC GLUCOMTR-MCNC: 125 MG/DL — HIGH (ref 70–99)
GLUCOSE BLDC GLUCOMTR-MCNC: 126 MG/DL — HIGH (ref 70–99)
GLUCOSE BLDC GLUCOMTR-MCNC: 185 MG/DL — HIGH (ref 70–99)

## 2023-09-25 PROCEDURE — 99233 SBSQ HOSP IP/OBS HIGH 50: CPT

## 2023-09-25 RX ORDER — INSULIN LISPRO 100/ML
20 VIAL (ML) SUBCUTANEOUS
Refills: 0 | Status: DISCONTINUED | OUTPATIENT
Start: 2023-09-25 | End: 2023-09-29

## 2023-09-25 RX ORDER — OXYCODONE HYDROCHLORIDE 5 MG/1
60 TABLET ORAL EVERY 12 HOURS
Refills: 0 | Status: DISCONTINUED | OUTPATIENT
Start: 2023-09-25 | End: 2023-09-29

## 2023-09-25 RX ORDER — INSULIN GLARGINE 100 [IU]/ML
50 INJECTION, SOLUTION SUBCUTANEOUS AT BEDTIME
Refills: 0 | Status: DISCONTINUED | OUTPATIENT
Start: 2023-09-25 | End: 2023-09-26

## 2023-09-25 RX ORDER — INSULIN LISPRO 100/ML
20 VIAL (ML) SUBCUTANEOUS
Refills: 0 | Status: DISCONTINUED | OUTPATIENT
Start: 2023-09-26 | End: 2023-09-29

## 2023-09-25 RX ORDER — INSULIN LISPRO 100/ML
16 VIAL (ML) SUBCUTANEOUS
Refills: 0 | Status: DISCONTINUED | OUTPATIENT
Start: 2023-09-26 | End: 2023-09-29

## 2023-09-25 RX ORDER — CHLORHEXIDINE GLUCONATE 213 G/1000ML
1 SOLUTION TOPICAL
Refills: 0 | Status: DISCONTINUED | OUTPATIENT
Start: 2023-09-25 | End: 2023-09-29

## 2023-09-25 RX ORDER — INSULIN LISPRO 100/ML
16 VIAL (ML) SUBCUTANEOUS
Refills: 0 | Status: DISCONTINUED | OUTPATIENT
Start: 2023-09-25 | End: 2023-09-25

## 2023-09-25 RX ADMIN — SENNA PLUS 2 TABLET(S): 8.6 TABLET ORAL at 23:04

## 2023-09-25 RX ADMIN — Medication 500 MILLIGRAM(S): at 11:54

## 2023-09-25 RX ADMIN — OXYCODONE HYDROCHLORIDE 60 MILLIGRAM(S): 5 TABLET ORAL at 17:17

## 2023-09-25 RX ADMIN — Medication 40 MILLIGRAM(S): at 05:05

## 2023-09-25 RX ADMIN — OXYCODONE HYDROCHLORIDE 5 MILLIGRAM(S): 5 TABLET ORAL at 11:51

## 2023-09-25 RX ADMIN — Medication 20 UNIT(S): at 13:24

## 2023-09-25 RX ADMIN — Medication 650 MILLIGRAM(S): at 17:18

## 2023-09-25 RX ADMIN — OXYCODONE HYDROCHLORIDE 5 MILLIGRAM(S): 5 TABLET ORAL at 23:05

## 2023-09-25 RX ADMIN — BUDESONIDE AND FORMOTEROL FUMARATE DIHYDRATE 2 PUFF(S): 160; 4.5 AEROSOL RESPIRATORY (INHALATION) at 05:05

## 2023-09-25 RX ADMIN — LIDOCAINE 1 APPLICATION(S): 4 CREAM TOPICAL at 17:19

## 2023-09-25 RX ADMIN — INSULIN GLARGINE 30 UNIT(S): 100 INJECTION, SOLUTION SUBCUTANEOUS at 09:24

## 2023-09-25 RX ADMIN — Medication 1 TABLET(S): at 11:55

## 2023-09-25 RX ADMIN — Medication 650 MILLIGRAM(S): at 11:51

## 2023-09-25 RX ADMIN — POLYETHYLENE GLYCOL 3350 17 GRAM(S): 17 POWDER, FOR SOLUTION ORAL at 05:04

## 2023-09-25 RX ADMIN — Medication 1 APPLICATION(S): at 05:05

## 2023-09-25 RX ADMIN — CEFTRIAXONE 100 MILLIGRAM(S): 500 INJECTION, POWDER, FOR SOLUTION INTRAMUSCULAR; INTRAVENOUS at 09:21

## 2023-09-25 RX ADMIN — OXYCODONE HYDROCHLORIDE 60 MILLIGRAM(S): 5 TABLET ORAL at 05:35

## 2023-09-25 RX ADMIN — LORATADINE 10 MILLIGRAM(S): 10 TABLET ORAL at 11:54

## 2023-09-25 RX ADMIN — INSULIN GLARGINE 50 UNIT(S): 100 INJECTION, SOLUTION SUBCUTANEOUS at 23:04

## 2023-09-25 RX ADMIN — OXYCODONE HYDROCHLORIDE 60 MILLIGRAM(S): 5 TABLET ORAL at 05:05

## 2023-09-25 RX ADMIN — Medication 20 UNIT(S): at 09:25

## 2023-09-25 RX ADMIN — OXYCODONE HYDROCHLORIDE 5 MILLIGRAM(S): 5 TABLET ORAL at 03:54

## 2023-09-25 RX ADMIN — Medication 81 MILLIGRAM(S): at 11:54

## 2023-09-25 RX ADMIN — Medication 1 APPLICATION(S): at 18:25

## 2023-09-25 RX ADMIN — APIXABAN 5 MILLIGRAM(S): 2.5 TABLET, FILM COATED ORAL at 05:04

## 2023-09-25 RX ADMIN — ATORVASTATIN CALCIUM 80 MILLIGRAM(S): 80 TABLET, FILM COATED ORAL at 23:05

## 2023-09-25 RX ADMIN — BUDESONIDE AND FORMOTEROL FUMARATE DIHYDRATE 2 PUFF(S): 160; 4.5 AEROSOL RESPIRATORY (INHALATION) at 17:18

## 2023-09-25 RX ADMIN — APIXABAN 5 MILLIGRAM(S): 2.5 TABLET, FILM COATED ORAL at 17:18

## 2023-09-25 NOTE — PROGRESS NOTE ADULT - TIME BILLING
Time-based billing (NON-critical care).     The necessity of the time spent during the encounter on this date of service was due to:     - Ordering, reviewing, and interpreting labs, testing, and imaging.  - Independently obtaining a review of systems and performing a physical exam  - Reviewing prior hospitalization and where necessary, outpatient records.  - Counselling and educating patient and family regarding interpretation of aforementioned items and plan of care.
Time-based billing (NON-critical care).     The necessity of the time spent during the encounter on this date of service was due to:     - Ordering, reviewing, and interpreting labs, testing, and imaging.  - Independently obtaining a review of systems and performing a physical exam  - Reviewing prior hospitalization and where necessary, outpatient records.  - Counselling and educating patient  regarding interpretation of aforementioned items and plan of care.
Time-based billing (NON-critical care).     The necessity of the time spent during the encounter on this date of service was due to:     - Ordering, reviewing, and interpreting labs, testing, and imaging.  - Independently obtaining a review of systems and performing a physical exam  - Reviewing prior hospitalization and where necessary, outpatient records.  - Counselling and educating patient and family regarding interpretation of aforementioned items and plan of care.
Time-based billing (NON-critical care).     The necessity of the time spent during the encounter on this date of service was due to:     - Ordering, reviewing, and interpreting labs, testing, and imaging.  - Independently obtaining a review of systems and performing a physical exam  - Reviewing prior hospitalization and where necessary, outpatient records.  - Counselling and educating patient regarding interpretation of aforementioned items and plan of care.
Time-based billing (NON-critical care).     The necessity of the time spent during the encounter on this date of service was due to:     - Ordering, reviewing, and interpreting labs, testing, and imaging.  - Independently obtaining a review of systems and performing a physical exam  - Reviewing prior hospitalization and where necessary, outpatient records.  - Counselling and educating patient and family regarding interpretation of aforementioned items and plan of care.
extensive chart review ([facility] hospital records, current admission labs/images/notes), interviewing and examining patient, discussion with nursing stafff, ACPs, placing orders, writing notes.
Time-based billing (NON-critical care).     The necessity of the time spent during the encounter on this date of service was due to:     - Ordering, reviewing, and interpreting labs, testing, and imaging.  - Independently obtaining a review of systems and performing a physical exam  - Reviewing prior hospitalization and where necessary, outpatient records.  - Counselling and educating patient and family regarding interpretation of aforementioned items and plan of care.

## 2023-09-25 NOTE — PROGRESS NOTE ADULT - SUBJECTIVE AND OBJECTIVE BOX
PROGRESS NOTE:     Patient is a 68y old  Female who presents with a chief complaint of Rectal Pain (25 Sep 2023 11:55)      SUBJECTIVE / OVERNIGHT EVENTS: Patient seen and evaluated at bedside. Patient denies chest pain/SOB or abdominal pain this morning.   ADDITIONAL REVIEW OF SYSTEMS:    MEDICATIONS  (STANDING):  acetaminophen     Tablet .. 650 milliGRAM(s) Oral every 6 hours  apixaban 5 milliGRAM(s) Oral every 12 hours  ascorbic acid 500 milliGRAM(s) Oral daily  aspirin enteric coated 81 milliGRAM(s) Oral daily  atorvastatin 80 milliGRAM(s) Oral at bedtime  budesonide 160 MICROgram(s)/formoterol 4.5 MICROgram(s) Inhaler 2 Puff(s) Inhalation two times a day  cadexomer iodine 0.9% Gel 1 Application(s) Topical daily  dextrose 5%. 1000 milliLiter(s) (50 mL/Hr) IV Continuous <Continuous>  dextrose 5%. 1000 milliLiter(s) (100 mL/Hr) IV Continuous <Continuous>  dextrose 50% Injectable 12.5 Gram(s) IV Push once  dextrose 50% Injectable 25 Gram(s) IV Push once  dextrose 50% Injectable 25 Gram(s) IV Push once  furosemide    Tablet 40 milliGRAM(s) Oral daily  glucagon  Injectable 1 milliGRAM(s) IntraMuscular once  insulin glargine Injectable (LANTUS) 30 Unit(s) SubCutaneous every morning  insulin glargine Injectable (LANTUS) 50 Unit(s) SubCutaneous at bedtime  insulin lispro (ADMELOG) corrective regimen sliding scale   SubCutaneous three times a day before meals  insulin lispro (ADMELOG) corrective regimen sliding scale   SubCutaneous <User Schedule>  insulin lispro Injectable (ADMELOG) 20 Unit(s) SubCutaneous three times a day before meals  lidocaine 2% Gel 1 Application(s) Topical daily  loratadine 10 milliGRAM(s) Oral daily  Nephro-dipesh 1 Tablet(s) Oral daily  nitroglycerin  0.2% Ointment Compound 1 Application(s) Rectal two times a day  oxyCODONE  ER Tablet 60 milliGRAM(s) Oral every 12 hours  polyethylene glycol 3350 17 Gram(s) Oral two times a day  senna 2 Tablet(s) Oral at bedtime    MEDICATIONS  (PRN):  albuterol    90 MICROgram(s) HFA Inhaler 2 Puff(s) Inhalation every 6 hours PRN Shortness of Breath and/or Wheezing  artificial tears (preservative free) Ophthalmic Solution 1 Drop(s) Both EYES four times a day PRN Dry Eyes  bisacodyl Suppository 10 milliGRAM(s) Rectal daily PRN Constipation  dextrose Oral Gel 15 Gram(s) Oral once PRN Blood Glucose LESS THAN 70 milliGRAM(s)/deciliter  ondansetron Injectable 4 milliGRAM(s) IV Push every 8 hours PRN Nausea and/or Vomiting  oxyCODONE    IR 5 milliGRAM(s) Oral every 6 hours PRN Severe Pain (7 - 10)  psyllium Powder 1 Packet(s) Oral daily PRN constipation      CAPILLARY BLOOD GLUCOSE      POCT Blood Glucose.: 108 mg/dL (25 Sep 2023 08:59)  POCT Blood Glucose.: 116 mg/dL (25 Sep 2023 03:15)  POCT Blood Glucose.: 125 mg/dL (25 Sep 2023 02:25)  POCT Blood Glucose.: 181 mg/dL (24 Sep 2023 22:28)  POCT Blood Glucose.: 104 mg/dL (24 Sep 2023 21:16)  POCT Blood Glucose.: 108 mg/dL (24 Sep 2023 17:08)    I&O's Summary      PHYSICAL EXAM:  Vital Signs Last 24 Hrs  T(C): 36.8 (25 Sep 2023 04:28), Max: 36.8 (25 Sep 2023 04:28)  T(F): 98.3 (25 Sep 2023 04:28), Max: 98.3 (25 Sep 2023 04:28)  HR: 66 (25 Sep 2023 04:28) (64 - 68)  BP: 127/64 (25 Sep 2023 04:28) (112/51 - 127/64)  BP(mean): --  RR: 18 (25 Sep 2023 04:28) (18 - 20)  SpO2: 96% (25 Sep 2023 04:28) (95% - 98%)    Parameters below as of 25 Sep 2023 04:28  Patient On (Oxygen Delivery Method): nasal cannula  O2 Flow (L/min): 4      CONSTITUTIONAL: NAD, obese body habitus   RESPIRATORY: Normal respiratory effort; lungs are clear to auscultation bilaterally  CARDIOVASCULAR: Regular rate and rhythm, normal S1 and S2, no murmur/rub/gallop  ABDOMEN: Nontender to palpation, normoactive bowel sounds, no rebound/guarding  EXTREMITIES: bilateral lower extremity edema, + venous stasis skin changes   PSYCH: A+O to person, place, and time; labile affect, + anxiety     LABS:                      RADIOLOGY & ADDITIONAL TESTS:  Results Reviewed:   Imaging Personally Reviewed:  Electrocardiogram Personally Reviewed:    COORDINATION OF CARE:  Care Discussed with Consultants/Other Providers [Y/N]:  Prior or Outpatient Records Reviewed [Y/N]:

## 2023-09-25 NOTE — PROGRESS NOTE ADULT - SUBJECTIVE AND OBJECTIVE BOX
Seen earlier today     Chief Complaint: Diabetes Mellitus follow up    INTERVAL HX: Tolerating POs with improved oral intake. reports good BM today. Most of BGs at goal with tightly controlled fasting  and pre- dinner  yesterday. Reports 75 lbs weight loss since hospital admission to [facility]( Aug 30th)      Review of Systems:  General: As above  GI: nausea improved, No vomiting, + BM today   Endocrine: no  S&Sx of hypoglycemia    Allergies    wool- rash, itch (Other)  latex (Rash)  adhesives (Rash)  Bactrim (Flushing)    Intolerances      MEDICATIONS  (STANDING):  atorvastatin 80 milliGRAM(s) Oral at bedtime  budesonide 160 MICROgram(s)/formoterol 4.5 MICROgram(s) Inhaler 2 Puff(s) Inhalation two times a day  dextrose 5%. 1000 milliLiter(s) (50 mL/Hr) IV Continuous <Continuous>  dextrose 5%. 1000 milliLiter(s) (100 mL/Hr) IV Continuous <Continuous>  dextrose 50% Injectable 12.5 Gram(s) IV Push once  dextrose 50% Injectable 25 Gram(s) IV Push once  dextrose 50% Injectable 25 Gram(s) IV Push once  glucagon  Injectable 1 milliGRAM(s) IntraMuscular once  insulin glargine Injectable (LANTUS) 30 Unit(s) SubCutaneous every morning  insulin glargine Injectable (LANTUS) 50 Unit(s) SubCutaneous at bedtime  insulin lispro (ADMELOG) corrective regimen sliding scale   SubCutaneous <User Schedule>  insulin lispro (ADMELOG) corrective regimen sliding scale   SubCutaneous three times a day before meals  insulin lispro Injectable (ADMELOG) 20 Unit(s) SubCutaneous before dinner    atorvastatin   80 milliGRAM(s) Oral (09-24-23 @ 21:18)    insulin glargine Injectable (LANTUS)   60 Unit(s) SubCutaneous (09-24-23 @ 22:39)    insulin glargine Injectable (LANTUS)   30 Unit(s) SubCutaneous (09-25-23 @ 09:24)    insulin lispro Injectable (ADMELOG)   20 Unit(s) SubCutaneous (09-25-23 @ 13:24)   20 Unit(s) SubCutaneous (09-25-23 @ 09:25)   20 Unit(s) SubCutaneous (09-24-23 @ 18:06)        PHYSICAL EXAM:  VITALS: T(C): 37 (09-25-23 @ 13:00)  T(F): 98.6 (09-25-23 @ 13:00), Max: 98.6 (09-25-23 @ 13:00)  HR: 74 (09-25-23 @ 13:00) (64 - 74)  BP: 121/56 (09-25-23 @ 13:00) (112/51 - 127/64)  RR:  (18 - 20)  SpO2:  (94% - 98%)  Wt(kg): --  GENERAL: female laying in bed, in NAD, obese  Respiratory: Respirations unlabored   Extremities: Warm, no edema  NEURO: Alert , appropriate     LABS:  POCT Blood Glucose.: 126 mg/dL (09-25-23 @ 12:52)  POCT Blood Glucose.: 108 mg/dL (09-25-23 @ 08:59)  POCT Blood Glucose.: 116 mg/dL (09-25-23 @ 03:15)  POCT Blood Glucose.: 125 mg/dL (09-25-23 @ 02:25)  POCT Blood Glucose.: 181 mg/dL (09-24-23 @ 22:28)  POCT Blood Glucose.: 104 mg/dL (09-24-23 @ 21:16)  POCT Blood Glucose.: 108 mg/dL (09-24-23 @ 17:08)  POCT Blood Glucose.: 132 mg/dL (09-24-23 @ 12:28)  POCT Blood Glucose.: 130 mg/dL (09-24-23 @ 10:56)  POCT Blood Glucose.: 139 mg/dL (09-24-23 @ 08:37)  POCT Blood Glucose.: 137 mg/dL (09-24-23 @ 01:55)  POCT Blood Glucose.: 159 mg/dL (09-23-23 @ 21:10)  POCT Blood Glucose.: 132 mg/dL (09-23-23 @ 17:39)  POCT Blood Glucose.: 130 mg/dL (09-23-23 @ 16:24)  POCT Blood Glucose.: 110 mg/dL (09-23-23 @ 12:19)  POCT Blood Glucose.: 138 mg/dL (09-23-23 @ 08:25)  POCT Blood Glucose.: 258 mg/dL (09-22-23 @ 21:53)  POCT Blood Glucose.: 377 mg/dL (09-22-23 @ 16:52)                Thyroid Function Tests:  09-17 @ 16:14 TSH 3.50 FreeT4 -- T3 71 Anti TPO -- Anti Thyroglobulin Ab -- TSI --      A1C with Estimated Average Glucose Result: 7.2 % (09-17-23 @ 16:13)  A1C with Estimated Average Glucose Result: 6.5 % (08-31-23 @ 10:25)    Estimated Average Glucose: 160 mg/dL (09-17-23 @ 16:13)  Estimated Average Glucose: 140 mg/dL (08-31-23 @ 10:25)        Diet, Consistent Carbohydrate/No Snacks:   DASH/TLC Sodium & Cholesterol Restricted (DASH)  Edward(7 Gm Arginine/7 Gm Glut/1.2 Gm HMB     Qty per Day:  2 (09-22-23 @ 17:47) [Active]

## 2023-09-25 NOTE — PROGRESS NOTE ADULT - ASSESSMENT
67yo F w/ PMH of HTN, HLD, DM2, HFpEF, CKD III, DVT, Uterine CA and recent hospitalization at [facility] from 08/30-09/08 for dyspnea thought likely 2/2 PE and severe pulm HTN, now on Eliquis, presents with rectal pain, had bowel movements today, will continue to monitor, seen by colorectal surgery, and pain management. Endocrine following for glucose control. Patient is recommended ROSE by PT but son and patient do not want to pursue. Dispo planning to continue as pain control is monitored.

## 2023-09-25 NOTE — PROGRESS NOTE ADULT - ASSESSMENT
68F presents with left foot wound     - patient seen and evaluated  - afebrile, vital signs stable   - bilateral anterior leg venous stasis ulcers, left plantar posterior heel wound to dermis, no drainage, no malodor, no signs of infection. Left plantar posterior deep tissue injury, no signs of infection. Left foot lateral border well- adhered eschar at level of MPJ, previous oupatient laceration well healed.   - Bilateral xray pending   - recommend application of Iodosorb to left heel wound followed by 4x4 gauze, ABD pad, yasir, ACE bandage. No less than three times a week.   - Edema control per Primary   - no acute pod intervention   - Podiatry stable for discharge, outpatient wound care recommendations, weight bearing status, and follow up info in Discharge provider note.   - Will follow periodically while in house

## 2023-09-25 NOTE — PROGRESS NOTE ADULT - PROBLEM SELECTOR PLAN 2
RESOLVED  Patient with complaint of burning urination while hospitalized, Repeat UA sent positive for many bacteria and large leukocyte esterases  s/p  Ceftriaxone x 3 days (9/23--9/25)  UCx normal vicente RESOLVED  Patient with complaint of burning urination while hospitalized, Repeat UA sent positive for many bacteria and large leukocyte esterases  s/p  Ceftriaxone x 3 days (9/23--9/25)  UCx 9/17 normal vicente  UCx 9/23 gram negative rods, f/u full report

## 2023-09-25 NOTE — PROGRESS NOTE ADULT - ASSESSMENT
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 Endocrine following for type 2 DM with hyperglycemia management. BG Goal 100-180mg/dl.   Tolerating POs with improved oral intake. Now eating full meals. Most of last 24 hour BGs at goal with tightly controlled BG in am and at dinner time.   Will adjust night time lantus and premeal insulin for lunch to prevent hypoglycemia. Reports significant weight lost ( 75 lbs) since last hospital admission to [facility] ( Aug 30th)    Home Regimen: Toujeo 86 units in am and 80 units at HS, Novolog 23 units TID.   A1C: 7.2  No endocrinologist

## 2023-09-25 NOTE — PROGRESS NOTE ADULT - SUBJECTIVE AND OBJECTIVE BOX
Patient is a 68y old  Female who presents with a chief complaint of Rectal Pain (24 Sep 2023 14:32)       INTERVAL HPI/OVERNIGHT EVENTS:  Patient seen and evaluated at bedside.  Pt is resting comfortable in NAD.     Allergies    wool- rash, itch (Other)  latex (Rash)  adhesives (Rash)  Bactrim (Flushing)    Intolerances        Vital Signs Last 24 Hrs  T(C): 36.8 (25 Sep 2023 04:28), Max: 36.8 (25 Sep 2023 04:28)  T(F): 98.3 (25 Sep 2023 04:28), Max: 98.3 (25 Sep 2023 04:28)  HR: 66 (25 Sep 2023 04:28) (64 - 74)  BP: 127/64 (25 Sep 2023 04:28) (97/60 - 127/64)  BP(mean): --  RR: 18 (25 Sep 2023 04:28) (17 - 20)  SpO2: 96% (25 Sep 2023 04:28) (95% - 98%)    Parameters below as of 25 Sep 2023 04:28  Patient On (Oxygen Delivery Method): nasal cannula  O2 Flow (L/min): 4      LABS:            Urinalysis Basic - ( 23 Sep 2023 12:30 )    Color: Yellow / Appearance: Slightly Turbid / S.013 / pH: x  Gluc: x / Ketone: Negative  / Bili: Negative / Urobili: Negative   Blood: x / Protein: 30 mg/dL / Nitrite: Negative   Leuk Esterase: Large / RBC: 2 /hpf / WBC 52 /HPF   Sq Epi: x / Non Sq Epi: x / Bacteria: Many      CAPILLARY BLOOD GLUCOSE      POCT Blood Glucose.: 108 mg/dL (25 Sep 2023 08:59)  POCT Blood Glucose.: 116 mg/dL (25 Sep 2023 03:15)  POCT Blood Glucose.: 125 mg/dL (25 Sep 2023 02:25)  POCT Blood Glucose.: 181 mg/dL (24 Sep 2023 22:28)  POCT Blood Glucose.: 104 mg/dL (24 Sep 2023 21:16)  POCT Blood Glucose.: 108 mg/dL (24 Sep 2023 17:08)  POCT Blood Glucose.: 132 mg/dL (24 Sep 2023 12:28)      Lower Extremity Physical Exam:  Vascular: DP/PT 0/4, B/L, CFT <3 seconds B/L, Temperature gradient warm to cool, B/L.   Neuro: Epicritic sensation diminished to the level of digits, B/L.  Musculoskeletal/Ortho: right charcot midfoot collapse  Skin:  bilateral anterior leg venous stasis ulcers, left plantar posterior heel wound to dermis, no drainage, no malodor, no signs of infection.   Left plantar posterior deep tissue injury, no signs of infection. Left foot lateral border well- adhered eschar at level of MPJ, previous oupatient laceration well healed.

## 2023-09-26 LAB
ANION GAP SERPL CALC-SCNC: 12 MMOL/L — SIGNIFICANT CHANGE UP (ref 5–17)
BUN SERPL-MCNC: 48 MG/DL — HIGH (ref 7–23)
CALCIUM SERPL-MCNC: 10.2 MG/DL — SIGNIFICANT CHANGE UP (ref 8.4–10.5)
CHLORIDE SERPL-SCNC: 98 MMOL/L — SIGNIFICANT CHANGE UP (ref 96–108)
CO2 SERPL-SCNC: 29 MMOL/L — SIGNIFICANT CHANGE UP (ref 22–31)
CREAT SERPL-MCNC: 1.79 MG/DL — HIGH (ref 0.5–1.3)
EGFR: 30 ML/MIN/1.73M2 — LOW
GLUCOSE BLDC GLUCOMTR-MCNC: 102 MG/DL — HIGH (ref 70–99)
GLUCOSE BLDC GLUCOMTR-MCNC: 112 MG/DL — HIGH (ref 70–99)
GLUCOSE BLDC GLUCOMTR-MCNC: 117 MG/DL — HIGH (ref 70–99)
GLUCOSE BLDC GLUCOMTR-MCNC: 131 MG/DL — HIGH (ref 70–99)
GLUCOSE BLDC GLUCOMTR-MCNC: 93 MG/DL — SIGNIFICANT CHANGE UP (ref 70–99)
GLUCOSE SERPL-MCNC: 121 MG/DL — HIGH (ref 70–99)
HCT VFR BLD CALC: 41.2 % — SIGNIFICANT CHANGE UP (ref 34.5–45)
HGB BLD-MCNC: 12.8 G/DL — SIGNIFICANT CHANGE UP (ref 11.5–15.5)
MCHC RBC-ENTMCNC: 28.3 PG — SIGNIFICANT CHANGE UP (ref 27–34)
MCHC RBC-ENTMCNC: 31.1 GM/DL — LOW (ref 32–36)
MCV RBC AUTO: 90.9 FL — SIGNIFICANT CHANGE UP (ref 80–100)
MRSA PCR RESULT.: SIGNIFICANT CHANGE UP
NRBC # BLD: 0 /100 WBCS — SIGNIFICANT CHANGE UP (ref 0–0)
PLATELET # BLD AUTO: 224 K/UL — SIGNIFICANT CHANGE UP (ref 150–400)
POTASSIUM SERPL-MCNC: 4.1 MMOL/L — SIGNIFICANT CHANGE UP (ref 3.5–5.3)
POTASSIUM SERPL-SCNC: 4.1 MMOL/L — SIGNIFICANT CHANGE UP (ref 3.5–5.3)
RBC # BLD: 4.53 M/UL — SIGNIFICANT CHANGE UP (ref 3.8–5.2)
RBC # FLD: 15.3 % — HIGH (ref 10.3–14.5)
S AUREUS DNA NOSE QL NAA+PROBE: SIGNIFICANT CHANGE UP
SODIUM SERPL-SCNC: 139 MMOL/L — SIGNIFICANT CHANGE UP (ref 135–145)
WBC # BLD: 7.34 K/UL — SIGNIFICANT CHANGE UP (ref 3.8–10.5)
WBC # FLD AUTO: 7.34 K/UL — SIGNIFICANT CHANGE UP (ref 3.8–10.5)

## 2023-09-26 PROCEDURE — 99232 SBSQ HOSP IP/OBS MODERATE 35: CPT

## 2023-09-26 PROCEDURE — 99233 SBSQ HOSP IP/OBS HIGH 50: CPT

## 2023-09-26 RX ORDER — INSULIN GLARGINE 100 [IU]/ML
46 INJECTION, SOLUTION SUBCUTANEOUS AT BEDTIME
Refills: 0 | Status: DISCONTINUED | OUTPATIENT
Start: 2023-09-26 | End: 2023-09-27

## 2023-09-26 RX ADMIN — Medication 1 APPLICATION(S): at 11:23

## 2023-09-26 RX ADMIN — Medication 650 MILLIGRAM(S): at 00:01

## 2023-09-26 RX ADMIN — POLYETHYLENE GLYCOL 3350 17 GRAM(S): 17 POWDER, FOR SOLUTION ORAL at 06:26

## 2023-09-26 RX ADMIN — Medication 40 MILLIGRAM(S): at 06:25

## 2023-09-26 RX ADMIN — OXYCODONE HYDROCHLORIDE 60 MILLIGRAM(S): 5 TABLET ORAL at 06:26

## 2023-09-26 RX ADMIN — Medication 1 APPLICATION(S): at 05:38

## 2023-09-26 RX ADMIN — BUDESONIDE AND FORMOTEROL FUMARATE DIHYDRATE 2 PUFF(S): 160; 4.5 AEROSOL RESPIRATORY (INHALATION) at 05:38

## 2023-09-26 RX ADMIN — Medication 20 UNIT(S): at 17:07

## 2023-09-26 RX ADMIN — ATORVASTATIN CALCIUM 80 MILLIGRAM(S): 80 TABLET, FILM COATED ORAL at 23:27

## 2023-09-26 RX ADMIN — Medication 16 UNIT(S): at 12:38

## 2023-09-26 RX ADMIN — APIXABAN 5 MILLIGRAM(S): 2.5 TABLET, FILM COATED ORAL at 06:25

## 2023-09-26 RX ADMIN — Medication 650 MILLIGRAM(S): at 10:15

## 2023-09-26 RX ADMIN — Medication 650 MILLIGRAM(S): at 06:24

## 2023-09-26 RX ADMIN — Medication 1 APPLICATION(S): at 17:09

## 2023-09-26 RX ADMIN — Medication 1 TABLET(S): at 10:15

## 2023-09-26 RX ADMIN — INSULIN GLARGINE 46 UNIT(S): 100 INJECTION, SOLUTION SUBCUTANEOUS at 22:41

## 2023-09-26 RX ADMIN — Medication 650 MILLIGRAM(S): at 01:27

## 2023-09-26 RX ADMIN — POLYETHYLENE GLYCOL 3350 17 GRAM(S): 17 POWDER, FOR SOLUTION ORAL at 17:07

## 2023-09-26 RX ADMIN — Medication 500 MILLIGRAM(S): at 10:14

## 2023-09-26 RX ADMIN — Medication 81 MILLIGRAM(S): at 10:15

## 2023-09-26 RX ADMIN — OXYCODONE HYDROCHLORIDE 5 MILLIGRAM(S): 5 TABLET ORAL at 10:14

## 2023-09-26 RX ADMIN — APIXABAN 5 MILLIGRAM(S): 2.5 TABLET, FILM COATED ORAL at 17:06

## 2023-09-26 RX ADMIN — INSULIN GLARGINE 30 UNIT(S): 100 INJECTION, SOLUTION SUBCUTANEOUS at 08:14

## 2023-09-26 RX ADMIN — LORATADINE 10 MILLIGRAM(S): 10 TABLET ORAL at 10:15

## 2023-09-26 RX ADMIN — OXYCODONE HYDROCHLORIDE 5 MILLIGRAM(S): 5 TABLET ORAL at 23:36

## 2023-09-26 RX ADMIN — Medication 20 UNIT(S): at 08:13

## 2023-09-26 RX ADMIN — Medication 650 MILLIGRAM(S): at 17:06

## 2023-09-26 RX ADMIN — OXYCODONE HYDROCHLORIDE 5 MILLIGRAM(S): 5 TABLET ORAL at 00:13

## 2023-09-26 RX ADMIN — OXYCODONE HYDROCHLORIDE 60 MILLIGRAM(S): 5 TABLET ORAL at 17:07

## 2023-09-26 RX ADMIN — BUDESONIDE AND FORMOTEROL FUMARATE DIHYDRATE 2 PUFF(S): 160; 4.5 AEROSOL RESPIRATORY (INHALATION) at 17:08

## 2023-09-26 RX ADMIN — LIDOCAINE 1 APPLICATION(S): 4 CREAM TOPICAL at 11:24

## 2023-09-26 NOTE — PROGRESS NOTE ADULT - ASSESSMENT
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 Endocrine following for type 2 DM with hyperglycemia management. BG Goal 100-180mg/dl.   Tolerating POs with improved oral intake. Now eating full meals. Most of last 24 hour BGs at goal with tightly controlled BG in am and at dinner time.   Will adjust night time lantus and premeal insulin for lunch to prevent hypoglycemia. Reports significant weight lost ( 75 lbs) since last hospital admission to [facility] ( Aug 30th)    Home Regimen: Toujeo 86 units in am and 80 units at HS, Novolog 23 units TID.   A1C: 7.2  No endocrinologist

## 2023-09-26 NOTE — PROGRESS NOTE ADULT - ASSESSMENT
67yo F w/ PMH of HTN, HLD, DM2, HFpEF, CKD III, DVT, Uterine CA and recent hospitalization at [facility] from 08/30-09/08 for dyspnea thought likely 2/2 PE and severe pulm HTN, now on Eliquis presents with rectal pain likely in setting of constipation due to opioids. Also managing diabetes with episodes of hypoglycemia. Patient recommended for ROSE and currently in discharge planning process.

## 2023-09-26 NOTE — PROGRESS NOTE ADULT - PROBLEM SELECTOR PLAN 2
RESOLVED  Patient with complaint of burning urination while hospitalized, Repeat UA sent positive for many bacteria and large leukocyte esterases  s/p  Ceftriaxone x 3 days (9/23--9/25)  UCx 9/17 normal vicente  UCx 9/23 gram negative rods, f/u full report, though clinically cured

## 2023-09-26 NOTE — CHART NOTE - NSCHARTNOTEFT_GEN_A_CORE
Based on patient's ongoing issues with deconditioning and essentially bedbound due to Severe Pulmonary Hypertension, Heart Failure, Chronic Pain and Morbid Obesity, patient will require a semi-electric hospital bed. This is necessary to achieve positioning and elevation - Head of bed needs to be elevated at least 30 degrees most of the time. Bed pillows and wedges have been tried and ruled out.    Patient will also require Toan Lift to transfer from bed to chair and from chair to bed.

## 2023-09-26 NOTE — PROGRESS NOTE ADULT - SUBJECTIVE AND OBJECTIVE BOX
[facility]-- WOUND TEAM -- FOLLOW UP NOTE  --------------------------------------------------------------------------------    24 hour events/subjective:          Diet:  Diet, Consistent Carbohydrate/No Snacks:   DASH/TLC Sodium & Cholesterol Restricted (DASH)  Edward(7 Gm Arginine/7 Gm Glut/1.2 Gm HMB     Qty per Day:  2 (09-22-23 @ 17:47)      ROS: General/ SKIN/ MSK/ Vasc see HPI  all other systems negative    ALLERGIES & MEDICATIONS  --------------------------------------------------------------------------------  Allergies  wool- rash, itch (Other)  latex (Rash)  adhesives (Rash)  Bactrim (Flushing)      STANDING INPATIENT MEDICATIONS  acetaminophen  Tablet  650 milliGRAM(s) Oral every 6 hours  apixaban 5 milliGRAM(s) Oral every 12 hours  ascorbic acid 500 milliGRAM(s) Oral daily  aspirin enteric coated 81 milliGRAM(s) Oral daily  atorvastatin 80 milliGRAM(s) Oral at bedtime  budesonide 160 MICROgram(s)/formoterol 4.5 MICROgram(s) Inhaler 2 Puff(s) Inhalation two times a day  cadexomer iodine 0.9% Gel 1 Application(s) Topical daily  chlorhexidine 2% Cloths 1 Application(s) Topical <User Schedule>  dextrose 5%. 1000 milliLiter(s) IV Continuous <Continuous>  dextrose 5%. 1000 milliLiter(s) IV Continuous <Continuous>  dextrose 50% Injectable 25 Gram(s) IV Push once  dextrose 50% Injectable 12.5 Gram(s) IV Push once  dextrose 50% Injectable 25 Gram(s) IV Push once  furosemide    Tablet 40 milliGRAM(s) Oral daily  glucagon  Injectable 1 milliGRAM(s) IntraMuscular once  insulin glargine Injectable (LANTUS) 30 Unit(s) SubCutaneous every morning  insulin glargine Injectable (LANTUS) 46 Unit(s) SubCutaneous at bedtime  insulin lispro (ADMELOG) corrective regimen sliding scale   SubCutaneous three times a day before meals  insulin lispro (ADMELOG) corrective regimen sliding scale   SubCutaneous <User Schedule>  insulin lispro Injectable (ADMELOG) 16 Unit(s) SubCutaneous before lunch  insulin lispro Injectable (ADMELOG) 20 Unit(s) SubCutaneous before dinner  insulin lispro Injectable (ADMELOG) 20 Unit(s) SubCutaneous before breakfast  lidocaine 2% Gel 1 Application(s) Topical daily  loratadine 10 milliGRAM(s) Oral daily  Nephro-dipesh 1 Tablet(s) Oral daily  nitroglycerin  0.2% Ointment Compound 1 Application(s) Rectal two times a day  oxyCODONE  ER Tablet 60 milliGRAM(s) Oral every 12 hours  polyethylene glycol 3350 17 Gram(s) Oral two times a day  senna 2 Tablet(s) Oral at bedtime      PRN INPATIENT MEDICATION  albuterol  90 MICROgram(s) HFA Inhaler 2 Puff(s) Inhalation every 6 hours PRN  artificial tears (preservative free) Ophthalmic Solution 1 Drop(s) Both EYES four times a day PRN  bisacodyl Suppository 10 milliGRAM(s) Rectal daily PRN  dextrose Oral Gel 15 Gram(s) Oral once PRN  ondansetron Injectable 4 milliGRAM(s) IV Push every 8 hours PRN  oxyCODONE    IR 5 milliGRAM(s) Oral every 6 hours PRN  psyllium Powder 1 Packet(s) Oral daily PRN        VITALS/PHYSICAL EXAM  --------------------------------------------------------------------------------  T(C): 36.6 (09-26-23 @ 04:40), Max: 36.7 (09-25-23 @ 19:00)  HR: 68 (09-26-23 @ 12:47) (68 - 72)  BP: 122/66 (09-26-23 @ 12:47) (104/68 - 122/73)  RR: 18 (09-26-23 @ 04:40) (18 - 18)  SpO2: 98% (09-26-23 @ 12:47) (95% - 98%)  Wt(kg): --                  LABS/ CULTURES/ RADIOLOGY:              12.8   7.34  >-----------<  224      [09-26-23 @ 11:10]              41.2     139  |  98  |  48  ----------------------------<  121      [09-26-23 @ 11:10]  4.1   |  29  |  1.79        Ca     10.2     [09-26-23 @ 11:10]        CAPILLARY BLOOD GLUCOSE  POCT Blood Glucose.: 117 mg/dL (26 Sep 2023 17:05)  POCT Blood Glucose.: 112 mg/dL (26 Sep 2023 11:37)  POCT Blood Glucose.: 93 mg/dL (26 Sep 2023 07:35)  POCT Blood Glucose.: 131 mg/dL (26 Sep 2023 02:00)  POCT Blood Glucose.: 185 mg/dL (25 Sep 2023 23:03)        A1C with Estimated Average Glucose Result: 7.2 % (09-17-23 @ 16:13)  A1C with Estimated Average Glucose Result: 6.5 % (08-31-23 @ 10:25)   [facility]-- WOUND TEAM -- FOLLOW UP NOTE  --------------------------------------------------------------------------------    24 hour events/subjective:    Afebrile  tolerating po w/o n/v  tolerating dresssings     less swelling     no pain, excess drainage, nor odor      Diet:  Diet, Consistent Carbohydrate/No Snacks:   DASH/TLC Sodium & Cholesterol Restricted (DASH)  Edward(7 Gm Arginine/7 Gm Glut/1.2 Gm HMB     Qty per Day:  2 (09-22-23 @ 17:47)      ROS: General/ SKIN/ MSK/ Vasc see HPI  all other systems negative    ALLERGIES & MEDICATIONS  --------------------------------------------------------------------------------  Allergies  wool- rash, itch (Other)  latex (Rash)  adhesives (Rash)  Bactrim (Flushing)      STANDING INPATIENT MEDICATIONS  acetaminophen  Tablet  650 milliGRAM(s) Oral every 6 hours  apixaban 5 milliGRAM(s) Oral every 12 hours  ascorbic acid 500 milliGRAM(s) Oral daily  aspirin enteric coated 81 milliGRAM(s) Oral daily  atorvastatin 80 milliGRAM(s) Oral at bedtime  budesonide 160 MICROgram(s)/formoterol 4.5 MICROgram(s) Inhaler 2 Puff(s) Inhalation two times a day  cadexomer iodine 0.9% Gel 1 Application(s) Topical daily  chlorhexidine 2% Cloths 1 Application(s) Topical <User Schedule>  dextrose 5%. 1000 milliLiter(s) IV Continuous <Continuous>  dextrose 5%. 1000 milliLiter(s) IV Continuous <Continuous>  dextrose 50% Injectable 25 Gram(s) IV Push once  dextrose 50% Injectable 12.5 Gram(s) IV Push once  dextrose 50% Injectable 25 Gram(s) IV Push once  furosemide    Tablet 40 milliGRAM(s) Oral daily  glucagon  Injectable 1 milliGRAM(s) IntraMuscular once  insulin glargine Injectable (LANTUS) 30 Unit(s) SubCutaneous every morning  insulin glargine Injectable (LANTUS) 46 Unit(s) SubCutaneous at bedtime  insulin lispro (ADMELOG) corrective regimen sliding scale   SubCutaneous three times a day before meals  insulin lispro (ADMELOG) corrective regimen sliding scale   SubCutaneous <User Schedule>  insulin lispro Injectable (ADMELOG) 16 Unit(s) SubCutaneous before lunch  insulin lispro Injectable (ADMELOG) 20 Unit(s) SubCutaneous before dinner  insulin lispro Injectable (ADMELOG) 20 Unit(s) SubCutaneous before breakfast  lidocaine 2% Gel 1 Application(s) Topical daily  loratadine 10 milliGRAM(s) Oral daily  Nephro-dipesh 1 Tablet(s) Oral daily  nitroglycerin  0.2% Ointment Compound 1 Application(s) Rectal two times a day  oxyCODONE  ER Tablet 60 milliGRAM(s) Oral every 12 hours  polyethylene glycol 3350 17 Gram(s) Oral two times a day  senna 2 Tablet(s) Oral at bedtime      PRN INPATIENT MEDICATION  albuterol  90 MICROgram(s) HFA Inhaler 2 Puff(s) Inhalation every 6 hours PRN  artificial tears (preservative free) Ophthalmic Solution 1 Drop(s) Both EYES four times a day PRN  bisacodyl Suppository 10 milliGRAM(s) Rectal daily PRN  dextrose Oral Gel 15 Gram(s) Oral once PRN  ondansetron Injectable 4 milliGRAM(s) IV Push every 8 hours PRN  oxyCODONE    IR 5 milliGRAM(s) Oral every 6 hours PRN  psyllium Powder 1 Packet(s) Oral daily PRN        VITALS/PHYSICAL EXAM  --------------------------------------------------------------------------------  T(C): 36.6 (09-26-23 @ 04:40), Max: 36.7 (09-25-23 @ 19:00)  HR: 68 (09-26-23 @ 12:47) (68 - 72)  BP: 122/66 (09-26-23 @ 12:47) (104/68 - 122/73)  RR: 18 (09-26-23 @ 04:40) (18 - 18)  SpO2: 98% (09-26-23 @ 12:47) (95% - 98%)  Wt(kg): --        NAD A&Ox3  MO  Versa Care P500 bed  HEENT:  NC/AT, EOMI, sclera clear, mucosa moist, throat clear, trachea midline, neck supple  Respiratory: nonlabored w/ equal chest rise  Gastrointestinal soft NT/ND   : (+)purewick  Neurology: strength & sensation grossly intact  Psych: calm/ appropriate  Musculoskeletal:   FROM, no deformities/ contractures  Vascular: BLE equally warm,  no cyanosis, clubbing,        BLE edema equal, BLE hemosiderin staining  BLE w/ faint erythema of resolved fungal component      several small superficial wounds w/o drainage        Rt foot Charcot deformity, Lateral plantar foot w/ dry callous            Rt 2nd toe dry scab and 3rd toe intact blister             no palp DP/PT pulses palpable        Lt (+)DP pulse palpable, Lt 2&3rd toes w/scabs and 5th toe w/ dry ulcer              no discrete lesion w/ drainage        no acute ischemia noted  No odor, erythema, increased warmth, tenderness, induration, fluctuance, nor crepitus  Skin:  dry w/ good turgor  Abdominal and Groin Pannus skin folds w/ moist skin w/ fading erythema suggestive of fungal rash     no open skin or blistering or discrete area of drainage  No odor, erythema, increased warmth, tenderness, induration, fluctuance, nor crepitus          LABS/ CULTURES/ RADIOLOGY:              12.8   7.34  >-----------<  224      [09-26-23 @ 11:10]              41.2     139  |  98  |  48  ----------------------------<  121      [09-26-23 @ 11:10]  4.1   |  29  |  1.79        Ca     10.2     [09-26-23 @ 11:10]        CAPILLARY BLOOD GLUCOSE  POCT Blood Glucose.: 117 mg/dL (26 Sep 2023 17:05)  POCT Blood Glucose.: 112 mg/dL (26 Sep 2023 11:37)  POCT Blood Glucose.: 93 mg/dL (26 Sep 2023 07:35)  POCT Blood Glucose.: 131 mg/dL (26 Sep 2023 02:00)  POCT Blood Glucose.: 185 mg/dL (25 Sep 2023 23:03)        A1C with Estimated Average Glucose Result: 7.2 % (09-17-23 @ 16:13)  A1C with Estimated Average Glucose Result: 6.5 % (08-31-23 @ 10:25)

## 2023-09-26 NOTE — PROGRESS NOTE ADULT - SUBJECTIVE AND OBJECTIVE BOX
Patient is a 68y old  Female who presents with a chief complaint of Rectal Pain (26 Sep 2023 10:18)      SUBJECTIVE / OVERNIGHT EVENTS: Patient seen and examined at bedside. No acute events overnight. Denies abdominal pain. She has periodic abdominal spasms. Endorses regular BM. No change in respiratory status. Understands we need to plan for discharge. She wants her son Abimael involved.    MEDICATIONS  (STANDING):  acetaminophen     Tablet .. 650 milliGRAM(s) Oral every 6 hours  apixaban 5 milliGRAM(s) Oral every 12 hours  ascorbic acid 500 milliGRAM(s) Oral daily  aspirin enteric coated 81 milliGRAM(s) Oral daily  atorvastatin 80 milliGRAM(s) Oral at bedtime  budesonide 160 MICROgram(s)/formoterol 4.5 MICROgram(s) Inhaler 2 Puff(s) Inhalation two times a day  cadexomer iodine 0.9% Gel 1 Application(s) Topical daily  chlorhexidine 2% Cloths 1 Application(s) Topical <User Schedule>  dextrose 5%. 1000 milliLiter(s) (100 mL/Hr) IV Continuous <Continuous>  dextrose 5%. 1000 milliLiter(s) (50 mL/Hr) IV Continuous <Continuous>  dextrose 50% Injectable 12.5 Gram(s) IV Push once  dextrose 50% Injectable 25 Gram(s) IV Push once  dextrose 50% Injectable 25 Gram(s) IV Push once  furosemide    Tablet 40 milliGRAM(s) Oral daily  glucagon  Injectable 1 milliGRAM(s) IntraMuscular once  insulin glargine Injectable (LANTUS) 30 Unit(s) SubCutaneous every morning  insulin glargine Injectable (LANTUS) 50 Unit(s) SubCutaneous at bedtime  insulin lispro (ADMELOG) corrective regimen sliding scale   SubCutaneous <User Schedule>  insulin lispro (ADMELOG) corrective regimen sliding scale   SubCutaneous three times a day before meals  insulin lispro Injectable (ADMELOG) 20 Unit(s) SubCutaneous before breakfast  insulin lispro Injectable (ADMELOG) 16 Unit(s) SubCutaneous before lunch  insulin lispro Injectable (ADMELOG) 20 Unit(s) SubCutaneous before dinner  lidocaine 2% Gel 1 Application(s) Topical daily  loratadine 10 milliGRAM(s) Oral daily  Nephro-dipesh 1 Tablet(s) Oral daily  nitroglycerin  0.2% Ointment Compound 1 Application(s) Rectal two times a day  oxyCODONE  ER Tablet 60 milliGRAM(s) Oral every 12 hours  polyethylene glycol 3350 17 Gram(s) Oral two times a day  senna 2 Tablet(s) Oral at bedtime    MEDICATIONS  (PRN):  albuterol    90 MICROgram(s) HFA Inhaler 2 Puff(s) Inhalation every 6 hours PRN Shortness of Breath and/or Wheezing  artificial tears (preservative free) Ophthalmic Solution 1 Drop(s) Both EYES four times a day PRN Dry Eyes  bisacodyl Suppository 10 milliGRAM(s) Rectal daily PRN Constipation  dextrose Oral Gel 15 Gram(s) Oral once PRN Blood Glucose LESS THAN 70 milliGRAM(s)/deciliter  ondansetron Injectable 4 milliGRAM(s) IV Push every 8 hours PRN Nausea and/or Vomiting  oxyCODONE    IR 5 milliGRAM(s) Oral every 6 hours PRN Severe Pain (7 - 10)  psyllium Powder 1 Packet(s) Oral daily PRN constipation      CAPILLARY BLOOD GLUCOSE      POCT Blood Glucose.: 112 mg/dL (26 Sep 2023 11:37)  POCT Blood Glucose.: 93 mg/dL (26 Sep 2023 07:35)  POCT Blood Glucose.: 131 mg/dL (26 Sep 2023 02:00)  POCT Blood Glucose.: 185 mg/dL (25 Sep 2023 23:03)  POCT Blood Glucose.: 107 mg/dL (25 Sep 2023 16:42)    I&O's Summary    25 Sep 2023 07:01  -  26 Sep 2023 07:00  --------------------------------------------------------  IN: 0 mL / OUT: 450 mL / NET: -450 mL        PHYSICAL EXAM:  Vital Signs Last 24 Hrs  T(C): 36.6 (26 Sep 2023 04:40), Max: 36.7 (25 Sep 2023 19:00)  T(F): 97.9 (26 Sep 2023 04:40), Max: 98.1 (25 Sep 2023 19:00)  HR: 68 (26 Sep 2023 12:47) (68 - 72)  BP: 122/66 (26 Sep 2023 12:47) (104/68 - 122/73)  BP(mean): --  RR: 18 (26 Sep 2023 04:40) (18 - 18)  SpO2: 98% (26 Sep 2023 12:47) (95% - 98%)    Parameters below as of 26 Sep 2023 12:47  Patient On (Oxygen Delivery Method): nasal cannula  O2 Flow (L/min): 4      GEN: female in NAD, appears comfortable, no diaphoresis  EYES: No scleral injection, EOMI  ENTM: neck supple & symmetric without tracheal deviation, moist membranes, no gross hearing impairment, thyroid gland not enlarged  CV: +S1/S2, no m/r/g, no abdominal bruit  RESP: breathing comfortably, no respiratory accessory muscle use, CTAB, no w/r/r  GI: normoactive BS, soft, NTND, no rebounding/guarding, no palpable masses    LABS:                        12.8   7.34  )-----------( 224      ( 26 Sep 2023 11:10 )             41.2     09-26    139  |  98  |  48<H>  ----------------------------<  121<H>  4.1   |  29  |  1.79<H>    Ca    10.2      26 Sep 2023 11:10            Urinalysis Basic - ( 26 Sep 2023 11:10 )    Color: x / Appearance: x / SG: x / pH: x  Gluc: 121 mg/dL / Ketone: x  / Bili: x / Urobili: x   Blood: x / Protein: x / Nitrite: x   Leuk Esterase: x / RBC: x / WBC x   Sq Epi: x / Non Sq Epi: x / Bacteria: x          RADIOLOGY & ADDITIONAL TESTS:  Results Reviewed:   Imaging Personally Reviewed:  Electrocardiogram Personally Reviewed:    COORDINATION OF CARE:  Care Discussed with Consultants/Other Providers [Y/N]:  Prior or Outpatient Records Reviewed [Y/N]:

## 2023-09-26 NOTE — PROGRESS NOTE ADULT - SUBJECTIVE AND OBJECTIVE BOX
Chief Complaint: Diabetes Mellitus follow up    INTERVAL HX: patient seen earlier today at the bedside, on the phone. Tolerating POs with improved oral intake. Glucose mostly at goal, fasting glucose 93      Review of Systems:  General: As above  GI: nausea improved, No vomiting, + BM today   Endocrine: no  S&Sx of hypoglycemia    Allergies    wool- rash, itch (Other)  latex (Rash)  adhesives (Rash)  Bactrim (Flushing)    Intolerances      MEDICATIONS  (STANDING):  atorvastatin 80 milliGRAM(s) Oral at bedtime  budesonide 160 MICROgram(s)/formoterol 4.5 MICROgram(s) Inhaler 2 Puff(s) Inhalation two times a day  dextrose 5%. 1000 milliLiter(s) (50 mL/Hr) IV Continuous <Continuous>  dextrose 5%. 1000 milliLiter(s) (100 mL/Hr) IV Continuous <Continuous>  dextrose 50% Injectable 12.5 Gram(s) IV Push once  dextrose 50% Injectable 25 Gram(s) IV Push once  dextrose 50% Injectable 25 Gram(s) IV Push once  glucagon  Injectable 1 milliGRAM(s) IntraMuscular once  insulin glargine Injectable (LANTUS) 30 Unit(s) SubCutaneous every morning  insulin glargine Injectable (LANTUS) 50 Unit(s) SubCutaneous at bedtime  insulin lispro (ADMELOG) corrective regimen sliding scale   SubCutaneous <User Schedule>  insulin lispro (ADMELOG) corrective regimen sliding scale   SubCutaneous three times a day before meals  insulin lispro Injectable (ADMELOG) 20 Unit(s) SubCutaneous before dinner    atorvastatin   80 milliGRAM(s) Oral (09-24-23 @ 21:18)    insulin glargine Injectable (LANTUS)   60 Unit(s) SubCutaneous (09-24-23 @ 22:39)    insulin glargine Injectable (LANTUS)   30 Unit(s) SubCutaneous (09-25-23 @ 09:24)    insulin lispro Injectable (ADMELOG)   20 Unit(s) SubCutaneous (09-25-23 @ 13:24)   20 Unit(s) SubCutaneous (09-25-23 @ 09:25)   20 Unit(s) SubCutaneous (09-24-23 @ 18:06)        PHYSICAL EXAM:   Vital Signs Last 24 Hrs  T(C): 36.6 (26 Sep 2023 04:40), Max: 36.7 (25 Sep 2023 19:00)  T(F): 97.9 (26 Sep 2023 04:40), Max: 98.1 (25 Sep 2023 19:00)  HR: 68 (26 Sep 2023 12:47) (68 - 72)  BP: 122/66 (26 Sep 2023 12:47) (104/68 - 122/73)  BP(mean): --  RR: 18 (26 Sep 2023 04:40) (18 - 18)  SpO2: 98% (26 Sep 2023 12:47) (95% - 98%)    Parameters below as of 26 Sep 2023 12:47  Patient On (Oxygen Delivery Method): nasal cannula  O2 Flow (L/min): 4    GENERAL: female laying in bed, in NAD, obese  Respiratory: Respirations unlabored   Extremities: Warm, no edema  NEURO: Alert , appropriate     LABS:  CAPILLARY BLOOD GLUCOSE      POCT Blood Glucose.: 117 mg/dL (26 Sep 2023 17:05)  POCT Blood Glucose.: 112 mg/dL (26 Sep 2023 11:37)  POCT Blood Glucose.: 93 mg/dL (26 Sep 2023 07:35)  POCT Blood Glucose.: 131 mg/dL (26 Sep 2023 02:00)  POCT Blood Glucose.: 185 mg/dL (25 Sep 2023 23:03)    POCT Blood Glucose.: 126 mg/dL (09-25-23 @ 12:52)  POCT Blood Glucose.: 108 mg/dL (09-25-23 @ 08:59)  POCT Blood Glucose.: 116 mg/dL (09-25-23 @ 03:15)  POCT Blood Glucose.: 125 mg/dL (09-25-23 @ 02:25)  POCT Blood Glucose.: 181 mg/dL (09-24-23 @ 22:28)  POCT Blood Glucose.: 104 mg/dL (09-24-23 @ 21:16)  POCT Blood Glucose.: 108 mg/dL (09-24-23 @ 17:08)  POCT Blood Glucose.: 132 mg/dL (09-24-23 @ 12:28)  POCT Blood Glucose.: 130 mg/dL (09-24-23 @ 10:56)  POCT Blood Glucose.: 139 mg/dL (09-24-23 @ 08:37)  POCT Blood Glucose.: 137 mg/dL (09-24-23 @ 01:55)  POCT Blood Glucose.: 159 mg/dL (09-23-23 @ 21:10)  POCT Blood Glucose.: 132 mg/dL (09-23-23 @ 17:39)  POCT Blood Glucose.: 130 mg/dL (09-23-23 @ 16:24)  POCT Blood Glucose.: 110 mg/dL (09-23-23 @ 12:19)  POCT Blood Glucose.: 138 mg/dL (09-23-23 @ 08:25)  POCT Blood Glucose.: 258 mg/dL (09-22-23 @ 21:53)  POCT Blood Glucose.: 377 mg/dL (09-22-23 @ 16:52)          09-26    139  |  98  |  48<H>  ----------------------------<  121<H>  4.1   |  29  |  1.79<H>    Ca    10.2      26 Sep 2023 11:10          Thyroid Function Tests:  09-17 @ 16:14 TSH 3.50 FreeT4 -- T3 71 Anti TPO -- Anti Thyroglobulin Ab -- TSI --      A1C with Estimated Average Glucose Result: 7.2 % (09-17-23 @ 16:13)  A1C with Estimated Average Glucose Result: 6.5 % (08-31-23 @ 10:25)    Estimated Average Glucose: 160 mg/dL (09-17-23 @ 16:13)  Estimated Average Glucose: 140 mg/dL (08-31-23 @ 10:25)        Diet, Consistent Carbohydrate/No Snacks:   DASH/TLC Sodium & Cholesterol Restricted (DASH)  Edward(7 Gm Arginine/7 Gm Glut/1.2 Gm HMB     Qty per Day:  2 (09-22-23 @ 17:47) [Active]

## 2023-09-26 NOTE — CHART NOTE - NSCHARTNOTEFT_GEN_A_CORE
Nutrition Follow Up Note  Patient seen for: first malnutrition follow up    Chart reviewed, events noted. Pt is a 69 y/o F with PMH: "HTN, HLD, DM2, HFpEF, CKD III, DVT, Uterine CA, recent hospitalization at [facility] from 08/30-09/08 for dyspnea thought likely 2/2 PE and severe pHTN, now on Eliquis, p/w rectal pain. Likely opioid-induced constipation. "Pain may also be d/t anal fissure vs thrombosed hemorrhoids." Pt s/p Abx x 3 days for acute UTI. Endocrinology following for glycemic management.    Source: [x] Patient       [x] EMR        [] RN        [] Family at bedside       [] Other:    -If unable to interview patient: [] Trach/Vent/BiPAP  [] Disoriented/confused/inappropriate to interview    Diet Order:   Diet, Consistent Carbohydrate/No Snacks:   DASH/TLC {Sodium & Cholesterol Restricted} (DASH)  Edward(7 Gm Arginine/7 Gm Glut/1.2 Gm HMB     Qty per Day:  2 (09-22-23)    - Is current order appropriate/adequate? [x] Yes  []  No:     - PO intake :   [x] >75%  Adequate    [] 50-75%  Fair       [] <50%  Poor    - Nutrition-related concerns:      -Pt reports appetite improved; consumed 100% of breakfast tray today      -Pt trying to consume Edward, but dislikes orange flavor      -      -    GI: No N/V/D/C reported. 2 BMs documented on RN flowsheets 9/25.   Bowel Regimen? [x] Yes - senna, Miralax  [] No    Weights: dosing wt 197.8 kg (9/17)  Daily: no new wts to assess    Nutritionally Pertinent MEDICATIONS  (STANDING):  ascorbic acid  atorvastatin  dextrose 5%.  dextrose 5%.  dextrose 50% Injectable  dextrose 50% Injectable  dextrose 50% Injectable  furosemide    Tablet  glucagon  Injectable  insulin glargine Injectable (LANTUS)  insulin glargine Injectable (LANTUS)  insulin lispro (ADMELOG) corrective regimen sliding scale  insulin lispro (ADMELOG) corrective regimen sliding scale  insulin lispro Injectable (ADMELOG)  insulin lispro Injectable (ADMELOG)  insulin lispro Injectable (ADMELOG)  Nephro-dipesh  nitroglycerin  0.2% Ointment Compound  polyethylene glycol 3350  senna    Pertinent Labs:   A1C with Estimated Average Glucose Result: 7.2 % (09-17-23 @ 16:13)  A1C with Estimated Average Glucose Result: 6.5 % (08-31-23 @ 10:25)    Finger Sticks:  POCT Blood Glucose.: 93 mg/dL (09-26 @ 07:35)  POCT Blood Glucose.: 131 mg/dL (09-26 @ 02:00)  POCT Blood Glucose.: 185 mg/dL (09-25 @ 23:03)  POCT Blood Glucose.: 107 mg/dL (09-25 @ 16:42)  POCT Blood Glucose.: 126 mg/dL (09-25 @ 12:52)    Skin per nursing documentation: sacrum St 3 pressure injury per RN flowsheets 9/24, left heel wound  Edema: no edema per RN flowsheets    Estimated Needs:   [] no change since previous assessment  [] recalculated:     Previous Nutrition Diagnosis: 1) severe malnutrition of chronic illness and 2) increased nutrient needs - addressed with diet/supplements  Nutrition Diagnosis is: [x] ongoing  [] resolved [] not applicable     New Nutrition Diagnosis: [x] Not applicable    Nutrition Care Plan:  [x] In Progress  [] Achieved  [] Not applicable    Nutrition Interventions:     Education Provided:       [x] Yes:  [] No:        Recommendations:         [] Continue current diet order            [] Add oral nutrition supplement:     [] Discontinue current diet order. Recommend:      [] Add micronutrient supplementation:      [] Continue current micronutrient supplementation:      [] Other:     Monitoring and Evaluation:   Continue to monitor nutritional intake, tolerance to diet prescription, weights, labs, skin integrity    RD remains available upon request and will follow up per protocol  Camelia Hernandez MPH, RD, CDN - TEAMS Nutrition Follow Up Note  Patient seen for: first malnutrition follow up    Chart reviewed, events noted. Pt is a 67 y/o F with PMH: "HTN, HLD, DM2, HFpEF, CKD III, DVT, Uterine CA, recent hospitalization at [facility] from 08/30-09/08 for dyspnea thought likely 2/2 PE and severe pHTN, now on Eliquis, p/w rectal pain. Likely opioid-induced constipation. "Pain may also be d/t anal fissure vs thrombosed hemorrhoids." Pt s/p Abx x 3 days for acute UTI. Endocrinology following for glycemic management. D/C plan pending: home vs ROSE.    Source: [x] Patient       [x] EMR        [] RN        [] Family at bedside       [] Other:    -If unable to interview patient: [] Trach/Vent/BiPAP  [] Disoriented/confused/inappropriate to interview    Diet Order:   Diet, Consistent Carbohydrate/No Snacks:   DASH/TLC {Sodium & Cholesterol Restricted} (DASH)  Edward(7 Gm Arginine/7 Gm Glut/1.2 Gm HMB     Qty per Day:  2 (09-22-23)    - Is current order appropriate/adequate? [x] Yes  []  No:     - PO intake :   [x] >75%  Adequate    [] 50-75%  Fair       [] <50%  Poor    - Nutrition-related concerns:      -Pt reports appetite improved; consumed 100% of breakfast tray today      -Pt trying to drink Edward, but dislikes orange flavor      -feels that blood sugars are being managed better with current diet and insulin adjustments      -fingersticks fluctuating - insulin regimen last adjusted 9/25    GI: No N/V/D/C reported. 2 BMs documented on RN flowsheets 9/25.   Bowel Regimen? [x] Yes - senna, Miralax  [] No    Weights: dosing wt 197.8 kg (9/17)  Daily: no new wts to assess    Nutritionally Pertinent MEDICATIONS  (STANDING):  ascorbic acid  atorvastatin  dextrose 5%.  dextrose 5%.  dextrose 50% Injectable  dextrose 50% Injectable  dextrose 50% Injectable  furosemide    Tablet  glucagon  Injectable  insulin glargine Injectable (LANTUS)  insulin glargine Injectable (LANTUS)  insulin lispro (ADMELOG) corrective regimen sliding scale  insulin lispro (ADMELOG) corrective regimen sliding scale  insulin lispro Injectable (ADMELOG)  insulin lispro Injectable (ADMELOG)  insulin lispro Injectable (ADMELOG)  Nephro-dipesh  nitroglycerin  0.2% Ointment Compound  polyethylene glycol 3350  senna    Pertinent Labs:   A1C with Estimated Average Glucose Result: 7.2 % (09-17-23 @ 16:13)  A1C with Estimated Average Glucose Result: 6.5 % (08-31-23 @ 10:25)    Finger Sticks:  POCT Blood Glucose.: 93 mg/dL (09-26 @ 07:35)  POCT Blood Glucose.: 131 mg/dL (09-26 @ 02:00)  POCT Blood Glucose.: 185 mg/dL (09-25 @ 23:03)  POCT Blood Glucose.: 107 mg/dL (09-25 @ 16:42)  POCT Blood Glucose.: 126 mg/dL (09-25 @ 12:52)    Skin per nursing documentation: sacrum St 3 pressure injury per RN flowsheets 9/24, left heel wound  Edema: no edema per RN flowsheets    Estimated Needs: based on IBW 57.3 kg  [] no change since previous assessment  [x] recalculated: increased needs iso pressure injury  Estimated Energy Needs: 1494-3970.5 kcal/day (30-35 kcal/kg)  Estmated Protein Needs: 68.76-80.22 g Pro/day (1.2-1.4 g Pro/kg)  Estimated Fluid Needs: defer to MD team    Previous Nutrition Diagnosis: 1) severe malnutrition of chronic illness - resolving with improved PO intake and subsided edema and 2) increased nutrient needs - addressed with diet/supplements  Nutrition Diagnosis is: [x] ongoing  [] resolved [] not applicable     New Nutrition Diagnosis: [x] Not applicable    Nutrition Care Plan:  [x] In Progress  [] Achieved  [] Not applicable    Nutrition Interventions:     Education Provided:       [x] Yes: consistent carbohydrate diet - portion control, pairing carbohydrates with protein [] No:        Recommendations:         [x] Continue current diet order: consistent carbohydrate, DASH diet            [x] Change Edward 2x/day to unflavored Edward 2x/day     [] Discontinue current diet order. Recommend:      [] Add micronutrient supplementation:      [x] Continue current micronutrient supplementation: Nephro-dipesh and vit C for wound healing     [x] Other: defer to Endo for ISS adjustments; reinforce diet education and compliance at f/u and PRN; RD to honor food preferences as offered/able    Monitoring and Evaluation:   Continue to monitor nutritional intake, tolerance to diet prescription, weights, labs, skin integrity    RD remains available upon request and will follow up per protocol  Camelia Hernandez MPH, RD, CDN - TEAMS

## 2023-09-26 NOTE — CHART NOTE - NSCHARTNOTEFT_GEN_A_CORE
Discussed with son Abimael over the phone at patient's behest.    Both patient and son are refusing ROSE. They have had bad experiences before. They state she will be able to manage at home and they have people who can come over and help. Patient with capacity, autonomy and independence. She is medically cleared for discharge (only on oral medications). She will follow up outpatient.    I did emphasize to patient that ROSE may be for the best, though she declines to pursue this any further.    Will relay to team, but let's discharge her ASAP. Prolonging this hospitalization is unlikely to provide much more benefit.

## 2023-09-26 NOTE — PROGRESS NOTE ADULT - ASSESSMENT
69yo F w/ PMH of HTN, HLD, DM2, HFpEF, CKD III, DVT, Uterine CA and recent hospitalization at [facility] from 08/30-09/08 for dyspnea thought likely 2/2 PE and severe pulm HTN, now on Eliquis, pw rectal pain, had bowel movements today, will continue to monitor, if patient pain persists may need Surg evaluation for thrombosed hemorrhoids.       Wound Consult requested to assist w/ management of:  BLE PVD/ PAD w/ stasis dermatitis  BLE fungal dermatitis resolved   BLE feet w/ wounds  Incontinence of urine and stool   Moisture Dermatitis of Groin/ Panus skin folds improving    BLE ADAPTIC  + ABD pads QD  BLE elevation & Compression  BLE feet wounds-  as per dpm   Consider VINOD/PVR & multilayer compression  Abx per Medicine-  Moisturize intact skin w/ SWEEN cream BID  Nutrition Consult for optimization            encourage high quality protein, MVI & Vit C to promote wound healing  Hyperglycemia - ADA diet and Lantus & FS w/ ISS  Continue turning and positioning w/ offloading assistive devices as per protocol  GROIN/ SKIN FOlds/ PANUS- After cleansing - Tuck INTERDry Ag QD and prn soiling  Buttocks/ Sacrum  TRIAD BID and prn soiling        Continue w/ attends under pads and Pericare w/ purewick care as per protocol  Waffle Cushion to chair when oob to chair  Continue w/ low air loss pressure redistribution bed surface   Care as per medicine, will follow w/ you  Upon discharge f/u as outpatient at [facility] [address] [facility] [phone]  D/w team & RN & attng  Etelvina Osborne PA-C [id number]  Nights/ Weekends/ Holidays please call:  General Surgery Consult pager ([phone]) for emergencies  Wound PT for multilayer leg wrapping or VAC issues ([phone])   I spent 35 minutes face to face w/ this pt of which more than 50% of the time was spent counseling & coordinating care of this pt.

## 2023-09-27 LAB
-  AMIKACIN: SIGNIFICANT CHANGE UP
-  AMOXICILLIN/CLAVULANIC ACID: SIGNIFICANT CHANGE UP
-  AMPICILLIN/SULBACTAM: SIGNIFICANT CHANGE UP
-  AMPICILLIN: SIGNIFICANT CHANGE UP
-  AZTREONAM: SIGNIFICANT CHANGE UP
-  CEFAZOLIN: SIGNIFICANT CHANGE UP
-  CEFEPIME: SIGNIFICANT CHANGE UP
-  CEFOXITIN: SIGNIFICANT CHANGE UP
-  CEFTRIAXONE: SIGNIFICANT CHANGE UP
-  CEFUROXIME: SIGNIFICANT CHANGE UP
-  CIPROFLOXACIN: SIGNIFICANT CHANGE UP
-  ERTAPENEM: SIGNIFICANT CHANGE UP
-  GENTAMICIN: SIGNIFICANT CHANGE UP
-  LEVOFLOXACIN: SIGNIFICANT CHANGE UP
-  MEROPENEM: SIGNIFICANT CHANGE UP
-  NITROFURANTOIN: SIGNIFICANT CHANGE UP
-  PIPERACILLIN/TAZOBACTAM: SIGNIFICANT CHANGE UP
-  TOBRAMYCIN: SIGNIFICANT CHANGE UP
-  TRIMETHOPRIM/SULFAMETHOXAZOLE: SIGNIFICANT CHANGE UP
CULTURE RESULTS: SIGNIFICANT CHANGE UP
GLUCOSE BLDC GLUCOMTR-MCNC: 102 MG/DL — HIGH (ref 70–99)
GLUCOSE BLDC GLUCOMTR-MCNC: 116 MG/DL — HIGH (ref 70–99)
GLUCOSE BLDC GLUCOMTR-MCNC: 126 MG/DL — HIGH (ref 70–99)
GLUCOSE BLDC GLUCOMTR-MCNC: 134 MG/DL — HIGH (ref 70–99)
GLUCOSE BLDC GLUCOMTR-MCNC: 97 MG/DL — SIGNIFICANT CHANGE UP (ref 70–99)
METHOD TYPE: SIGNIFICANT CHANGE UP
ORGANISM # SPEC MICROSCOPIC CNT: SIGNIFICANT CHANGE UP
ORGANISM # SPEC MICROSCOPIC CNT: SIGNIFICANT CHANGE UP
SPECIMEN SOURCE: SIGNIFICANT CHANGE UP

## 2023-09-27 PROCEDURE — 99232 SBSQ HOSP IP/OBS MODERATE 35: CPT

## 2023-09-27 RX ORDER — INSULIN GLARGINE 100 [IU]/ML
40 INJECTION, SOLUTION SUBCUTANEOUS AT BEDTIME
Refills: 0 | Status: DISCONTINUED | OUTPATIENT
Start: 2023-09-27 | End: 2023-09-28

## 2023-09-27 RX ORDER — INFLUENZA VIRUS VACCINE 15; 15; 15; 15 UG/.5ML; UG/.5ML; UG/.5ML; UG/.5ML
0.7 SUSPENSION INTRAMUSCULAR ONCE
Refills: 0 | Status: DISCONTINUED | OUTPATIENT
Start: 2023-09-27 | End: 2023-09-29

## 2023-09-27 RX ADMIN — APIXABAN 5 MILLIGRAM(S): 2.5 TABLET, FILM COATED ORAL at 17:04

## 2023-09-27 RX ADMIN — INSULIN GLARGINE 30 UNIT(S): 100 INJECTION, SOLUTION SUBCUTANEOUS at 08:54

## 2023-09-27 RX ADMIN — OXYCODONE HYDROCHLORIDE 5 MILLIGRAM(S): 5 TABLET ORAL at 22:10

## 2023-09-27 RX ADMIN — POLYETHYLENE GLYCOL 3350 17 GRAM(S): 17 POWDER, FOR SOLUTION ORAL at 17:05

## 2023-09-27 RX ADMIN — OXYCODONE HYDROCHLORIDE 60 MILLIGRAM(S): 5 TABLET ORAL at 05:41

## 2023-09-27 RX ADMIN — Medication 650 MILLIGRAM(S): at 23:11

## 2023-09-27 RX ADMIN — APIXABAN 5 MILLIGRAM(S): 2.5 TABLET, FILM COATED ORAL at 05:41

## 2023-09-27 RX ADMIN — BUDESONIDE AND FORMOTEROL FUMARATE DIHYDRATE 2 PUFF(S): 160; 4.5 AEROSOL RESPIRATORY (INHALATION) at 17:05

## 2023-09-27 RX ADMIN — LIDOCAINE 1 APPLICATION(S): 4 CREAM TOPICAL at 11:20

## 2023-09-27 RX ADMIN — Medication 40 MILLIGRAM(S): at 05:41

## 2023-09-27 RX ADMIN — LORATADINE 10 MILLIGRAM(S): 10 TABLET ORAL at 11:19

## 2023-09-27 RX ADMIN — Medication 20 UNIT(S): at 08:54

## 2023-09-27 RX ADMIN — POLYETHYLENE GLYCOL 3350 17 GRAM(S): 17 POWDER, FOR SOLUTION ORAL at 05:41

## 2023-09-27 RX ADMIN — Medication 500 MILLIGRAM(S): at 11:19

## 2023-09-27 RX ADMIN — Medication 650 MILLIGRAM(S): at 05:42

## 2023-09-27 RX ADMIN — Medication 650 MILLIGRAM(S): at 00:37

## 2023-09-27 RX ADMIN — Medication 650 MILLIGRAM(S): at 11:18

## 2023-09-27 RX ADMIN — Medication 1 APPLICATION(S): at 17:06

## 2023-09-27 RX ADMIN — OXYCODONE HYDROCHLORIDE 60 MILLIGRAM(S): 5 TABLET ORAL at 17:04

## 2023-09-27 RX ADMIN — ATORVASTATIN CALCIUM 80 MILLIGRAM(S): 80 TABLET, FILM COATED ORAL at 22:06

## 2023-09-27 RX ADMIN — OXYCODONE HYDROCHLORIDE 5 MILLIGRAM(S): 5 TABLET ORAL at 00:36

## 2023-09-27 RX ADMIN — OXYCODONE HYDROCHLORIDE 5 MILLIGRAM(S): 5 TABLET ORAL at 22:40

## 2023-09-27 RX ADMIN — CHLORHEXIDINE GLUCONATE 1 APPLICATION(S): 213 SOLUTION TOPICAL at 05:13

## 2023-09-27 RX ADMIN — Medication 81 MILLIGRAM(S): at 11:19

## 2023-09-27 RX ADMIN — Medication 1 APPLICATION(S): at 08:55

## 2023-09-27 RX ADMIN — Medication 20 UNIT(S): at 17:03

## 2023-09-27 RX ADMIN — Medication 650 MILLIGRAM(S): at 17:05

## 2023-09-27 RX ADMIN — Medication 1 APPLICATION(S): at 11:20

## 2023-09-27 RX ADMIN — SENNA PLUS 2 TABLET(S): 8.6 TABLET ORAL at 22:07

## 2023-09-27 RX ADMIN — Medication 1 TABLET(S): at 11:19

## 2023-09-27 RX ADMIN — INSULIN GLARGINE 40 UNIT(S): 100 INJECTION, SOLUTION SUBCUTANEOUS at 22:08

## 2023-09-27 RX ADMIN — Medication 16 UNIT(S): at 12:21

## 2023-09-27 NOTE — PROGRESS NOTE ADULT - ASSESSMENT
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 Endocrine following for type 2 DM with hyperglycemia management. BG Goal 100-180mg/dl.   Tolerating POs with BG mostly at goal, FBG <100mg/dl reduce qhs lantus dose to prevent hypoglycemia. Reports significant weight lost ( 75 lbs) since last hospital admission to [facility] ( Aug 30th)    Home Regimen: Toujeo 86 units in am and 80 units at HS, Novolog 23 units TID.   A1C: 7.2  No endocrinologist

## 2023-09-27 NOTE — PROGRESS NOTE ADULT - ASSESSMENT
67yo F w/ PMH of HTN, HLD, DM2, HFpEF, CKD III, DVT, Uterine CA and recent hospitalization at [facility] from 08/30-09/08 for dyspnea thought likely 2/2 PE and severe pulm HTN, now on Eliquis presents with rectal pain likely in setting of constipation due to opioids. Also managing diabetes with episodes of hypoglycemia. Patient recommended for ROSE and currently in discharge planning process.

## 2023-09-27 NOTE — PROGRESS NOTE ADULT - SUBJECTIVE AND OBJECTIVE BOX
seen earlier today     Chief Complaint: Diabetes Mellitus follow up    INTERVAL HX: tolerating po, requesting cooper on discharge       Review of Systems:  General: As above  GI: No nausea, vomiting  Endocrine: no  S&Sx of hypoglycemia    Allergies    wool- rash, itch (Other)  latex (Rash)  adhesives (Rash)  Bactrim (Flushing)    Intolerances      MEDICATIONS  (STANDING):  acetaminophen     Tablet .. 650 milliGRAM(s) Oral every 6 hours  apixaban 5 milliGRAM(s) Oral every 12 hours  ascorbic acid 500 milliGRAM(s) Oral daily  aspirin enteric coated 81 milliGRAM(s) Oral daily  atorvastatin 80 milliGRAM(s) Oral at bedtime  budesonide 160 MICROgram(s)/formoterol 4.5 MICROgram(s) Inhaler 2 Puff(s) Inhalation two times a day  cadexomer iodine 0.9% Gel 1 Application(s) Topical daily  chlorhexidine 2% Cloths 1 Application(s) Topical <User Schedule>  dextrose 5%. 1000 milliLiter(s) (50 mL/Hr) IV Continuous <Continuous>  dextrose 5%. 1000 milliLiter(s) (100 mL/Hr) IV Continuous <Continuous>  dextrose 50% Injectable 12.5 Gram(s) IV Push once  dextrose 50% Injectable 25 Gram(s) IV Push once  dextrose 50% Injectable 25 Gram(s) IV Push once  furosemide    Tablet 40 milliGRAM(s) Oral daily  glucagon  Injectable 1 milliGRAM(s) IntraMuscular once  influenza  Vaccine (HIGH DOSE) 0.7 milliLiter(s) IntraMuscular once  insulin glargine Injectable (LANTUS) 46 Unit(s) SubCutaneous at bedtime  insulin glargine Injectable (LANTUS) 30 Unit(s) SubCutaneous every morning  insulin lispro (ADMELOG) corrective regimen sliding scale   SubCutaneous three times a day before meals  insulin lispro (ADMELOG) corrective regimen sliding scale   SubCutaneous <User Schedule>  insulin lispro Injectable (ADMELOG) 16 Unit(s) SubCutaneous before lunch  insulin lispro Injectable (ADMELOG) 20 Unit(s) SubCutaneous before dinner  insulin lispro Injectable (ADMELOG) 20 Unit(s) SubCutaneous before breakfast  lidocaine 2% Gel 1 Application(s) Topical daily  loratadine 10 milliGRAM(s) Oral daily  Nephro-dipesh 1 Tablet(s) Oral daily  nitroglycerin  0.2% Ointment Compound 1 Application(s) Rectal two times a day  oxyCODONE  ER Tablet 60 milliGRAM(s) Oral every 12 hours  polyethylene glycol 3350 17 Gram(s) Oral two times a day  senna 2 Tablet(s) Oral at bedtime      atorvastatin   80 milliGRAM(s) Oral (09-26-23 @ 23:27)    insulin glargine Injectable (LANTUS)   30 Unit(s) SubCutaneous (09-27-23 @ 08:54)    insulin glargine Injectable (LANTUS)   46 Unit(s) SubCutaneous (09-26-23 @ 22:41)    insulin lispro Injectable (ADMELOG)   16 Unit(s) SubCutaneous (09-27-23 @ 12:21)    insulin lispro Injectable (ADMELOG)   20 Unit(s) SubCutaneous (09-26-23 @ 17:07)    insulin lispro Injectable (ADMELOG)   20 Unit(s) SubCutaneous (09-27-23 @ 08:54)        PHYSICAL EXAM:  VITALS: T(C): 37.4 (09-27-23 @ 12:30)  T(F): 99.3 (09-27-23 @ 12:30), Max: 99.3 (09-27-23 @ 12:30)  HR: 129 (09-27-23 @ 12:30) (60 - 129)  BP: 111/53 (09-27-23 @ 12:30) (111/53 - 135/68)  RR:  (18 - 18)  SpO2:  (94% - 99%)  Wt(kg): --  GENERAL: NAD  Respiratory: Respirations unlabored   Extremities: Warm, b/l ace wrap cdi   NEURO: Alert , appropriate     LABS:  POCT Blood Glucose.: 116 mg/dL (09-27-23 @ 12:17)  POCT Blood Glucose.: 97 mg/dL (09-27-23 @ 08:21)  POCT Blood Glucose.: 102 mg/dL (09-27-23 @ 02:42)  POCT Blood Glucose.: 102 mg/dL (09-26-23 @ 21:42)  POCT Blood Glucose.: 117 mg/dL (09-26-23 @ 17:05)  POCT Blood Glucose.: 112 mg/dL (09-26-23 @ 11:37)  POCT Blood Glucose.: 93 mg/dL (09-26-23 @ 07:35)  POCT Blood Glucose.: 131 mg/dL (09-26-23 @ 02:00)  POCT Blood Glucose.: 185 mg/dL (09-25-23 @ 23:03)  POCT Blood Glucose.: 107 mg/dL (09-25-23 @ 16:42)  POCT Blood Glucose.: 126 mg/dL (09-25-23 @ 12:52)  POCT Blood Glucose.: 108 mg/dL (09-25-23 @ 08:59)  POCT Blood Glucose.: 116 mg/dL (09-25-23 @ 03:15)  POCT Blood Glucose.: 125 mg/dL (09-25-23 @ 02:25)  POCT Blood Glucose.: 181 mg/dL (09-24-23 @ 22:28)  POCT Blood Glucose.: 104 mg/dL (09-24-23 @ 21:16)  POCT Blood Glucose.: 108 mg/dL (09-24-23 @ 17:08)                          12.8   7.34  )-----------( 224      ( 26 Sep 2023 11:10 )             41.2     09-26    139  |  98  |  48<H>  ----------------------------<  121<H>  4.1   |  29  |  1.79<H>    Ca    10.2      26 Sep 2023 11:10          Thyroid Function Tests:  09-17 @ 16:14 TSH 3.50 FreeT4 -- T3 71 Anti TPO -- Anti Thyroglobulin Ab -- TSI --      A1C with Estimated Average Glucose Result: 7.2 % (09-17-23 @ 16:13)  A1C with Estimated Average Glucose Result: 6.5 % (08-31-23 @ 10:25)    Estimated Average Glucose: 160 mg/dL (09-17-23 @ 16:13)  Estimated Average Glucose: 140 mg/dL (08-31-23 @ 10:25)        Diet, Consistent Carbohydrate/No Snacks:   DASH/TLC Sodium & Cholesterol Restricted (DASH)  Edward(7 Gm Arginine/7 Gm Glut/1.2 Gm HMB     Qty per Day:  2 (09-22-23 @ 17:47) [Active]

## 2023-09-27 NOTE — PROGRESS NOTE ADULT - PROBLEM SELECTOR PLAN 2
RESOLVED  Patient with complaint of burning urination while hospitalized, Repeat UA sent positive for many bacteria and large leukocyte esterases  s/p  Ceftriaxone x 3 days (9/23--9/25)  UCx 9/17 normal vicente  UCx 9/23 with pansensitive proteus

## 2023-09-27 NOTE — PROGRESS NOTE ADULT - SUBJECTIVE AND OBJECTIVE BOX
Patient is a 68y old  Female who presents with a chief complaint of Rectal Pain (27 Sep 2023 04:37)      SUBJECTIVE / OVERNIGHT EVENTS: Patient seen and examined at bedside. No acute events overnight. Had a BM yesterday. Pain is tolerable. Nervous about going home. Interested in COVID and influenza vaccinations prior to discharge.    MEDICATIONS  (STANDING):  acetaminophen     Tablet .. 650 milliGRAM(s) Oral every 6 hours  apixaban 5 milliGRAM(s) Oral every 12 hours  ascorbic acid 500 milliGRAM(s) Oral daily  aspirin enteric coated 81 milliGRAM(s) Oral daily  atorvastatin 80 milliGRAM(s) Oral at bedtime  budesonide 160 MICROgram(s)/formoterol 4.5 MICROgram(s) Inhaler 2 Puff(s) Inhalation two times a day  cadexomer iodine 0.9% Gel 1 Application(s) Topical daily  chlorhexidine 2% Cloths 1 Application(s) Topical <User Schedule>  dextrose 5%. 1000 milliLiter(s) (100 mL/Hr) IV Continuous <Continuous>  dextrose 5%. 1000 milliLiter(s) (50 mL/Hr) IV Continuous <Continuous>  dextrose 50% Injectable 25 Gram(s) IV Push once  dextrose 50% Injectable 12.5 Gram(s) IV Push once  dextrose 50% Injectable 25 Gram(s) IV Push once  furosemide    Tablet 40 milliGRAM(s) Oral daily  glucagon  Injectable 1 milliGRAM(s) IntraMuscular once  insulin glargine Injectable (LANTUS) 46 Unit(s) SubCutaneous at bedtime  insulin glargine Injectable (LANTUS) 30 Unit(s) SubCutaneous every morning  insulin lispro (ADMELOG) corrective regimen sliding scale   SubCutaneous three times a day before meals  insulin lispro (ADMELOG) corrective regimen sliding scale   SubCutaneous <User Schedule>  insulin lispro Injectable (ADMELOG) 20 Unit(s) SubCutaneous before breakfast  insulin lispro Injectable (ADMELOG) 16 Unit(s) SubCutaneous before lunch  insulin lispro Injectable (ADMELOG) 20 Unit(s) SubCutaneous before dinner  lidocaine 2% Gel 1 Application(s) Topical daily  loratadine 10 milliGRAM(s) Oral daily  Nephro-dipesh 1 Tablet(s) Oral daily  nitroglycerin  0.2% Ointment Compound 1 Application(s) Rectal two times a day  oxyCODONE  ER Tablet 60 milliGRAM(s) Oral every 12 hours  polyethylene glycol 3350 17 Gram(s) Oral two times a day  senna 2 Tablet(s) Oral at bedtime    MEDICATIONS  (PRN):  albuterol    90 MICROgram(s) HFA Inhaler 2 Puff(s) Inhalation every 6 hours PRN Shortness of Breath and/or Wheezing  artificial tears (preservative free) Ophthalmic Solution 1 Drop(s) Both EYES four times a day PRN Dry Eyes  bisacodyl Suppository 10 milliGRAM(s) Rectal daily PRN Constipation  dextrose Oral Gel 15 Gram(s) Oral once PRN Blood Glucose LESS THAN 70 milliGRAM(s)/deciliter  ondansetron Injectable 4 milliGRAM(s) IV Push every 8 hours PRN Nausea and/or Vomiting  oxyCODONE    IR 5 milliGRAM(s) Oral every 6 hours PRN Severe Pain (7 - 10)  psyllium Powder 1 Packet(s) Oral daily PRN constipation      CAPILLARY BLOOD GLUCOSE      POCT Blood Glucose.: 97 mg/dL (27 Sep 2023 08:21)  POCT Blood Glucose.: 102 mg/dL (27 Sep 2023 02:42)  POCT Blood Glucose.: 102 mg/dL (26 Sep 2023 21:42)  POCT Blood Glucose.: 117 mg/dL (26 Sep 2023 17:05)  POCT Blood Glucose.: 112 mg/dL (26 Sep 2023 11:37)    I&O's Summary    26 Sep 2023 07:01  -  27 Sep 2023 07:00  --------------------------------------------------------  IN: 480 mL / OUT: 600 mL / NET: -120 mL        PHYSICAL EXAM:  Vital Signs Last 24 Hrs  T(C): 36.9 (27 Sep 2023 05:33), Max: 37 (26 Sep 2023 20:30)  T(F): 98.4 (27 Sep 2023 05:33), Max: 98.6 (26 Sep 2023 20:30)  HR: 60 (27 Sep 2023 05:33) (60 - 70)  BP: 123/69 (27 Sep 2023 05:33) (122/66 - 135/68)  BP(mean): --  RR: 18 (27 Sep 2023 05:33) (18 - 18)  SpO2: 99% (27 Sep 2023 05:33) (96% - 99%)    Parameters below as of 27 Sep 2023 05:33  Patient On (Oxygen Delivery Method): nasal cannula  O2 Flow (L/min): 4      GEN: female in NAD, appears comfortable, no diaphoresis  EYES: No scleral injection, EOMI  ENTM: neck supple & symmetric without tracheal deviation, moist membranes, no gross hearing impairment, thyroid gland not enlarged  CV: +S1/S2, no m/r/g, no abdominal bruit, non-pitting swelling of feet to shins  RESP: breathing comfortably, no respiratory accessory muscle use, CTAB, no w/r/r  GI: normoactive BS, soft, NTND, no rebounding/guarding, no palpable masses    LABS:                        12.8   7.34  )-----------( 224      ( 26 Sep 2023 11:10 )             41.2     09-26    139  |  98  |  48<H>  ----------------------------<  121<H>  4.1   |  29  |  1.79<H>    Ca    10.2      26 Sep 2023 11:10            Urinalysis Basic - ( 26 Sep 2023 11:10 )    Color: x / Appearance: x / SG: x / pH: x  Gluc: 121 mg/dL / Ketone: x  / Bili: x / Urobili: x   Blood: x / Protein: x / Nitrite: x   Leuk Esterase: x / RBC: x / WBC x   Sq Epi: x / Non Sq Epi: x / Bacteria: x          RADIOLOGY & ADDITIONAL TESTS:  Results Reviewed:   Imaging Personally Reviewed:  Electrocardiogram Personally Reviewed:    COORDINATION OF CARE:  Care Discussed with Consultants/Other Providers [Y/N]:  Prior or Outpatient Records Reviewed [Y/N]:

## 2023-09-28 LAB
GLUCOSE BLDC GLUCOMTR-MCNC: 108 MG/DL — HIGH (ref 70–99)
GLUCOSE BLDC GLUCOMTR-MCNC: 113 MG/DL — HIGH (ref 70–99)
GLUCOSE BLDC GLUCOMTR-MCNC: 142 MG/DL — HIGH (ref 70–99)
GLUCOSE BLDC GLUCOMTR-MCNC: 156 MG/DL — HIGH (ref 70–99)
GLUCOSE BLDC GLUCOMTR-MCNC: 99 MG/DL — SIGNIFICANT CHANGE UP (ref 70–99)

## 2023-09-28 PROCEDURE — 99232 SBSQ HOSP IP/OBS MODERATE 35: CPT

## 2023-09-28 RX ORDER — FLUCONAZOLE 150 MG/1
150 TABLET ORAL
Qty: 10 | Refills: 0 | Status: COMPLETED | COMMUNITY
Start: 2022-07-08 | End: 2023-09-28

## 2023-09-28 RX ORDER — INSULIN GLARGINE 100 [IU]/ML
36 INJECTION, SOLUTION SUBCUTANEOUS AT BEDTIME
Refills: 0 | Status: DISCONTINUED | OUTPATIENT
Start: 2023-09-28 | End: 2023-09-29

## 2023-09-28 RX ORDER — LISINOPRIL 2.5 MG/1
1 TABLET ORAL
Refills: 0 | DISCHARGE

## 2023-09-28 RX ADMIN — OXYCODONE HYDROCHLORIDE 60 MILLIGRAM(S): 5 TABLET ORAL at 18:15

## 2023-09-28 RX ADMIN — APIXABAN 5 MILLIGRAM(S): 2.5 TABLET, FILM COATED ORAL at 17:40

## 2023-09-28 RX ADMIN — Medication 16 UNIT(S): at 12:05

## 2023-09-28 RX ADMIN — Medication 81 MILLIGRAM(S): at 12:04

## 2023-09-28 RX ADMIN — OXYCODONE HYDROCHLORIDE 5 MILLIGRAM(S): 5 TABLET ORAL at 12:33

## 2023-09-28 RX ADMIN — APIXABAN 5 MILLIGRAM(S): 2.5 TABLET, FILM COATED ORAL at 05:34

## 2023-09-28 RX ADMIN — BUDESONIDE AND FORMOTEROL FUMARATE DIHYDRATE 2 PUFF(S): 160; 4.5 AEROSOL RESPIRATORY (INHALATION) at 17:41

## 2023-09-28 RX ADMIN — POLYETHYLENE GLYCOL 3350 17 GRAM(S): 17 POWDER, FOR SOLUTION ORAL at 17:40

## 2023-09-28 RX ADMIN — LIDOCAINE 1 APPLICATION(S): 4 CREAM TOPICAL at 16:22

## 2023-09-28 RX ADMIN — LORATADINE 10 MILLIGRAM(S): 10 TABLET ORAL at 12:05

## 2023-09-28 RX ADMIN — Medication 20 UNIT(S): at 08:38

## 2023-09-28 RX ADMIN — Medication 650 MILLIGRAM(S): at 17:40

## 2023-09-28 RX ADMIN — Medication 650 MILLIGRAM(S): at 12:33

## 2023-09-28 RX ADMIN — INSULIN GLARGINE 30 UNIT(S): 100 INJECTION, SOLUTION SUBCUTANEOUS at 08:51

## 2023-09-28 RX ADMIN — OXYCODONE HYDROCHLORIDE 60 MILLIGRAM(S): 5 TABLET ORAL at 06:00

## 2023-09-28 RX ADMIN — OXYCODONE HYDROCHLORIDE 60 MILLIGRAM(S): 5 TABLET ORAL at 17:40

## 2023-09-28 RX ADMIN — OXYCODONE HYDROCHLORIDE 60 MILLIGRAM(S): 5 TABLET ORAL at 05:34

## 2023-09-28 RX ADMIN — OXYCODONE HYDROCHLORIDE 5 MILLIGRAM(S): 5 TABLET ORAL at 21:36

## 2023-09-28 RX ADMIN — Medication 1 APPLICATION(S): at 05:35

## 2023-09-28 RX ADMIN — POLYETHYLENE GLYCOL 3350 17 GRAM(S): 17 POWDER, FOR SOLUTION ORAL at 05:34

## 2023-09-28 RX ADMIN — OXYCODONE HYDROCHLORIDE 5 MILLIGRAM(S): 5 TABLET ORAL at 12:03

## 2023-09-28 RX ADMIN — Medication 1 APPLICATION(S): at 14:11

## 2023-09-28 RX ADMIN — Medication 20 UNIT(S): at 17:39

## 2023-09-28 RX ADMIN — Medication 1 TABLET(S): at 12:05

## 2023-09-28 RX ADMIN — Medication 40 MILLIGRAM(S): at 05:34

## 2023-09-28 RX ADMIN — CHLORHEXIDINE GLUCONATE 1 APPLICATION(S): 213 SOLUTION TOPICAL at 06:00

## 2023-09-28 RX ADMIN — BUDESONIDE AND FORMOTEROL FUMARATE DIHYDRATE 2 PUFF(S): 160; 4.5 AEROSOL RESPIRATORY (INHALATION) at 05:34

## 2023-09-28 RX ADMIN — Medication 650 MILLIGRAM(S): at 12:04

## 2023-09-28 RX ADMIN — Medication 500 MILLIGRAM(S): at 12:05

## 2023-09-28 RX ADMIN — ATORVASTATIN CALCIUM 80 MILLIGRAM(S): 80 TABLET, FILM COATED ORAL at 21:36

## 2023-09-28 RX ADMIN — INSULIN GLARGINE 36 UNIT(S): 100 INJECTION, SOLUTION SUBCUTANEOUS at 21:37

## 2023-09-28 RX ADMIN — Medication 1 APPLICATION(S): at 17:41

## 2023-09-28 RX ADMIN — Medication 650 MILLIGRAM(S): at 18:15

## 2023-09-28 NOTE — CHART NOTE - NSCHARTNOTEFT_GEN_A_CORE
Age: 68y    Gender: Female    POCT Blood Glucose:  142 mg/dL (09-28-23 @ 16:59)  113 mg/dL (09-28-23 @ 12:03)  108 mg/dL (09-28-23 @ 08:27)  99 mg/dL (09-28-23 @ 01:54)  126 mg/dL (09-27-23 @ 21:25)      eMAR:atorvastatin   80 milliGRAM(s) Oral (09-27-23 @ 22:06)    insulin glargine Injectable (LANTUS)   30 Unit(s) SubCutaneous (09-28-23 @ 08:51)    insulin glargine Injectable (LANTUS)   40 Unit(s) SubCutaneous (09-27-23 @ 22:08)    insulin lispro Injectable (ADMELOG)   16 Unit(s) SubCutaneous (09-28-23 @ 12:05)    insulin lispro Injectable (ADMELOG)   20 Unit(s) SubCutaneous (09-28-23 @ 08:38)    69 y/o F w/ hx of Type 2 DM w/ hyperglycemia complicated by nephropathy and neuropathy, HTN, HLD admitted with rectal pain (high risk patient with severe hyperglycemia with glucose values > 300's at high risk of CAD and CVA with high level decision making). Endocrine following for type 2 DM with hyperglycemia management. BG Goal 100-180mg/dl. BGs and insulin regimen reviewed. Overnight BG ( 99) and fasting BG ( 108) tightly controlled. Will adjust night time Lantus dose       Home Regimen: Toujeo 86 units in am and 80 units at HS, Novolog 23 units TID.   A1C: 7.2  No endocrinologist     # Uncontrolled type 2 diabetes mellitus with hyperglycemia.   - Test BGs ACTID and at HS  - Continue with Lantus 30 units in am and decrease Lantus to 36 units at HS.   - Continue with premeal Admelog 20 units before breakfast- 16 units before lunch - 20 units before dinner, Hold if NPO or eating < 50% of meals  - Continue with Mod correction scale qac + bedtime and add 2 am correction scale to monitor hypoglycemia   - please keep hypoglycemia protocol in place   - Continue consistent carbohydrate diet     Jaqueline Fenton NP-GAY  Contact via [employer] Teams during business hours  To reach covering provider access AMION via sunrise tools  For Urgent matters/after-hours/weekends/holidays please page endocrine fellow on call   For nonurgent matters please email [email or web address]

## 2023-09-28 NOTE — PROGRESS NOTE ADULT - ASSESSMENT
68F presents with left foot wound     - patient seen and evaluated  - afebrile, vital signs stable   - bilateral anterior leg venous stasis ulcers, left plantar posterior heel wound to dermis, no drainage, no malodor, no signs of infection. Left plantar posterior deep tissue injury, no signs of infection. Left foot lateral border well- adhered eschar at level of MPJ, previous oupatient laceration well healed.   - cont iodosorb to left heel wound followed by 4x4 gauze, ABD pad, yasir, ACE bandage. No less than three times a week.   - Edema control per Primary   - no acute pod intervention   - Podiatry stable for discharge, outpatient wound care recommendations, weight bearing status, and follow up info in Discharge provider note.   - Will follow periodically while in house

## 2023-09-28 NOTE — CHART NOTE - NSCHARTNOTESELECT_GEN_ALL_CORE
Event Note
Brief Progress Note
Endocrine follow up/Event Note
Event Note
Need for Home DME/Event Note
Nutrition Services
istop/Event Note

## 2023-09-28 NOTE — PROGRESS NOTE ADULT - ASSESSMENT
67yo F w/ PMH of HTN, HLD, DM2, HFpEF, CKD III, DVT, Uterine CA and recent hospitalization at [facility] from 08/30-09/08 for dyspnea thought likely 2/2 PE and severe pulm HTN, now on Eliquis presents with rectal pain likely in setting of constipation due to opioids. Also managing diabetes with episodes of hypoglycemia. Patient recommended for ROSE and currently in discharge planning process.

## 2023-09-28 NOTE — PROGRESS NOTE ADULT - SUBJECTIVE AND OBJECTIVE BOX
Patient is a 68y old  Female who presents with a chief complaint of Rectal Pain (28 Sep 2023 12:43)      SUBJECTIVE / OVERNIGHT EVENTS: Patient seen and examined at bedside. No acute events overnight. Tolerating PO. Regular BM. Pain controlled    MEDICATIONS  (STANDING):  acetaminophen     Tablet .. 650 milliGRAM(s) Oral every 6 hours  apixaban 5 milliGRAM(s) Oral every 12 hours  ascorbic acid 500 milliGRAM(s) Oral daily  aspirin enteric coated 81 milliGRAM(s) Oral daily  atorvastatin 80 milliGRAM(s) Oral at bedtime  budesonide 160 MICROgram(s)/formoterol 4.5 MICROgram(s) Inhaler 2 Puff(s) Inhalation two times a day  cadexomer iodine 0.9% Gel 1 Application(s) Topical daily  chlorhexidine 2% Cloths 1 Application(s) Topical <User Schedule>  dextrose 5%. 1000 milliLiter(s) (50 mL/Hr) IV Continuous <Continuous>  dextrose 5%. 1000 milliLiter(s) (100 mL/Hr) IV Continuous <Continuous>  dextrose 50% Injectable 25 Gram(s) IV Push once  dextrose 50% Injectable 12.5 Gram(s) IV Push once  dextrose 50% Injectable 25 Gram(s) IV Push once  furosemide    Tablet 40 milliGRAM(s) Oral daily  glucagon  Injectable 1 milliGRAM(s) IntraMuscular once  influenza  Vaccine (HIGH DOSE) 0.7 milliLiter(s) IntraMuscular once  insulin glargine Injectable (LANTUS) 30 Unit(s) SubCutaneous every morning  insulin glargine Injectable (LANTUS) 36 Unit(s) SubCutaneous at bedtime  insulin lispro (ADMELOG) corrective regimen sliding scale   SubCutaneous <User Schedule>  insulin lispro (ADMELOG) corrective regimen sliding scale   SubCutaneous three times a day before meals  insulin lispro Injectable (ADMELOG) 16 Unit(s) SubCutaneous before lunch  insulin lispro Injectable (ADMELOG) 20 Unit(s) SubCutaneous before dinner  insulin lispro Injectable (ADMELOG) 20 Unit(s) SubCutaneous before breakfast  lidocaine 2% Gel 1 Application(s) Topical daily  loratadine 10 milliGRAM(s) Oral daily  Nephro-dipesh 1 Tablet(s) Oral daily  nitroglycerin  0.2% Ointment Compound 1 Application(s) Rectal two times a day  oxyCODONE  ER Tablet 60 milliGRAM(s) Oral every 12 hours  polyethylene glycol 3350 17 Gram(s) Oral two times a day  senna 2 Tablet(s) Oral at bedtime    MEDICATIONS  (PRN):  albuterol    90 MICROgram(s) HFA Inhaler 2 Puff(s) Inhalation every 6 hours PRN Shortness of Breath and/or Wheezing  artificial tears (preservative free) Ophthalmic Solution 1 Drop(s) Both EYES four times a day PRN Dry Eyes  bisacodyl Suppository 10 milliGRAM(s) Rectal daily PRN Constipation  dextrose Oral Gel 15 Gram(s) Oral once PRN Blood Glucose LESS THAN 70 milliGRAM(s)/deciliter  ondansetron Injectable 4 milliGRAM(s) IV Push every 8 hours PRN Nausea and/or Vomiting  oxyCODONE    IR 5 milliGRAM(s) Oral every 6 hours PRN Severe Pain (7 - 10)  psyllium Powder 1 Packet(s) Oral daily PRN constipation      CAPILLARY BLOOD GLUCOSE      POCT Blood Glucose.: 113 mg/dL (28 Sep 2023 12:03)  POCT Blood Glucose.: 108 mg/dL (28 Sep 2023 08:27)  POCT Blood Glucose.: 99 mg/dL (28 Sep 2023 01:54)  POCT Blood Glucose.: 126 mg/dL (27 Sep 2023 21:25)  POCT Blood Glucose.: 134 mg/dL (27 Sep 2023 16:51)    I&O's Summary    27 Sep 2023 07:01  -  28 Sep 2023 07:00  --------------------------------------------------------  IN: 360 mL / OUT: 250 mL / NET: 110 mL        PHYSICAL EXAM:  Vital Signs Last 24 Hrs  T(C): 36.8 (28 Sep 2023 13:15), Max: 36.9 (27 Sep 2023 17:12)  T(F): 98.3 (28 Sep 2023 13:15), Max: 98.5 (28 Sep 2023 04:27)  HR: 67 (28 Sep 2023 13:15) (65 - 70)  BP: 104/57 (28 Sep 2023 13:15) (104/57 - 138/65)  BP(mean): --  RR: 18 (28 Sep 2023 13:15) (18 - 18)  SpO2: 92% (28 Sep 2023 13:15) (92% - 98%)    Parameters below as of 28 Sep 2023 13:15  Patient On (Oxygen Delivery Method): nasal cannula        GEN: female in NAD, appears comfortable, no diaphoresis  EYES: No scleral injection, EOMI  ENTM: neck supple & symmetric without tracheal deviation, moist membranes, no gross hearing impairment, thyroid gland not enlarged  CV: +S1/S2, no m/r/g, no abdominal bruit  RESP: breathing comfortably, no respiratory accessory muscle use, CTAB, no w/r/r  GI: normoactive BS, soft, NTND, no rebounding/guarding, no palpable masses    LABS:                      RADIOLOGY & ADDITIONAL TESTS:  Results Reviewed:   Imaging Personally Reviewed:  Electrocardiogram Personally Reviewed:    COORDINATION OF CARE:  Care Discussed with Consultants/Other Providers [Y/N]:  Prior or Outpatient Records Reviewed [Y/N]:

## 2023-09-29 ENCOUNTER — NON-APPOINTMENT (OUTPATIENT)
Age: 68
End: 2023-09-29

## 2023-09-29 ENCOUNTER — INPATIENT (INPATIENT)
Facility: HOSPITAL | Age: 68
LOS: 4 days | Discharge: NURSING, REHAB OR LONG-TERM CARE | DRG: 556 | End: 2023-10-04
Attending: INTERNAL MEDICINE | Admitting: HOSPITALIST
Payer: MEDICARE

## 2023-09-29 VITALS
OXYGEN SATURATION: 96 % | HEART RATE: 77 BPM | RESPIRATION RATE: 22 BRPM | DIASTOLIC BLOOD PRESSURE: 71 MMHG | SYSTOLIC BLOOD PRESSURE: 174 MMHG | TEMPERATURE: 98 F

## 2023-09-29 VITALS
DIASTOLIC BLOOD PRESSURE: 64 MMHG | HEART RATE: 66 BPM | RESPIRATION RATE: 18 BRPM | SYSTOLIC BLOOD PRESSURE: 123 MMHG | OXYGEN SATURATION: 94 % | TEMPERATURE: 98 F

## 2023-09-29 DIAGNOSIS — Z90.49 ACQUIRED ABSENCE OF OTHER SPECIFIED PARTS OF DIGESTIVE TRACT: Chronic | ICD-10-CM

## 2023-09-29 DIAGNOSIS — Z98.890 OTHER SPECIFIED POSTPROCEDURAL STATES: Chronic | ICD-10-CM

## 2023-09-29 DIAGNOSIS — Z90.710 ACQUIRED ABSENCE OF BOTH CERVIX AND UTERUS: Chronic | ICD-10-CM

## 2023-09-29 LAB
GLUCOSE BLDC GLUCOMTR-MCNC: 152 MG/DL — HIGH (ref 70–99)
GLUCOSE BLDC GLUCOMTR-MCNC: 175 MG/DL — HIGH (ref 70–99)
GLUCOSE BLDC GLUCOMTR-MCNC: 216 MG/DL — HIGH (ref 70–99)

## 2023-09-29 PROCEDURE — 99285 EMERGENCY DEPT VISIT HI MDM: CPT

## 2023-09-29 PROCEDURE — 99232 SBSQ HOSP IP/OBS MODERATE 35: CPT

## 2023-09-29 PROCEDURE — 99239 HOSP IP/OBS DSCHRG MGMT >30: CPT

## 2023-09-29 RX ORDER — INSULIN GLARGINE 100 [IU]/ML
30 INJECTION, SOLUTION SUBCUTANEOUS
Qty: 0 | Refills: 0 | DISCHARGE
Start: 2023-09-29

## 2023-09-29 RX ORDER — OXYCODONE HYDROCHLORIDE 5 MG/1
1 TABLET ORAL
Qty: 0 | Refills: 0 | DISCHARGE
Start: 2023-09-29

## 2023-09-29 RX ORDER — OXYCODONE HYDROCHLORIDE 5 MG/1
1 TABLET ORAL
Qty: 12 | Refills: 0
Start: 2023-09-29 | End: 2023-10-01

## 2023-09-29 RX ORDER — OXYCODONE HYDROCHLORIDE 5 MG/1
60 TABLET ORAL ONCE
Refills: 0 | Status: DISCONTINUED | OUTPATIENT
Start: 2023-09-29 | End: 2023-09-29

## 2023-09-29 RX ORDER — INSULIN GLARGINE 100 [IU]/ML
68 INJECTION, SOLUTION SUBCUTANEOUS
Qty: 0 | Refills: 0 | DISCHARGE

## 2023-09-29 RX ORDER — NITROGLYCERIN 6.5 MG
1 CAPSULE, EXTENDED RELEASE ORAL
Qty: 60 | Refills: 0
Start: 2023-09-29 | End: 2023-10-28

## 2023-09-29 RX ORDER — INSULIN GLARGINE 100 [IU]/ML
28 INJECTION, SOLUTION SUBCUTANEOUS AT BEDTIME
Refills: 0 | Status: DISCONTINUED | OUTPATIENT
Start: 2023-09-29 | End: 2023-09-29

## 2023-09-29 RX ORDER — INSULIN GLARGINE 100 [IU]/ML
28 INJECTION, SOLUTION SUBCUTANEOUS
Qty: 0 | Refills: 0 | DISCHARGE
Start: 2023-09-29

## 2023-09-29 RX ORDER — OXYCODONE HYDROCHLORIDE 5 MG/1
1 TABLET ORAL
Qty: 0 | Refills: 0 | DISCHARGE

## 2023-09-29 RX ORDER — ATORVASTATIN CALCIUM 80 MG/1
80 TABLET, FILM COATED ORAL ONCE
Refills: 0 | Status: COMPLETED | OUTPATIENT
Start: 2023-09-29 | End: 2023-09-29

## 2023-09-29 RX ADMIN — Medication 4: at 12:29

## 2023-09-29 RX ADMIN — OXYCODONE HYDROCHLORIDE 60 MILLIGRAM(S): 5 TABLET ORAL at 22:25

## 2023-09-29 RX ADMIN — ATORVASTATIN CALCIUM 80 MILLIGRAM(S): 80 TABLET, FILM COATED ORAL at 22:25

## 2023-09-29 RX ADMIN — Medication 500 MILLIGRAM(S): at 11:47

## 2023-09-29 RX ADMIN — CHLORHEXIDINE GLUCONATE 1 APPLICATION(S): 213 SOLUTION TOPICAL at 06:08

## 2023-09-29 RX ADMIN — Medication 81 MILLIGRAM(S): at 11:47

## 2023-09-29 RX ADMIN — LIDOCAINE 1 APPLICATION(S): 4 CREAM TOPICAL at 11:50

## 2023-09-29 RX ADMIN — APIXABAN 5 MILLIGRAM(S): 2.5 TABLET, FILM COATED ORAL at 06:07

## 2023-09-29 RX ADMIN — Medication 20 UNIT(S): at 09:01

## 2023-09-29 RX ADMIN — OXYCODONE HYDROCHLORIDE 5 MILLIGRAM(S): 5 TABLET ORAL at 11:47

## 2023-09-29 RX ADMIN — Medication 1 APPLICATION(S): at 06:08

## 2023-09-29 RX ADMIN — Medication 650 MILLIGRAM(S): at 06:08

## 2023-09-29 RX ADMIN — Medication 1 TABLET(S): at 11:46

## 2023-09-29 RX ADMIN — Medication 650 MILLIGRAM(S): at 00:02

## 2023-09-29 RX ADMIN — BUDESONIDE AND FORMOTEROL FUMARATE DIHYDRATE 2 PUFF(S): 160; 4.5 AEROSOL RESPIRATORY (INHALATION) at 06:07

## 2023-09-29 RX ADMIN — INSULIN GLARGINE 30 UNIT(S): 100 INJECTION, SOLUTION SUBCUTANEOUS at 12:29

## 2023-09-29 RX ADMIN — Medication 650 MILLIGRAM(S): at 11:47

## 2023-09-29 RX ADMIN — LORATADINE 10 MILLIGRAM(S): 10 TABLET ORAL at 11:47

## 2023-09-29 RX ADMIN — Medication 2: at 09:00

## 2023-09-29 RX ADMIN — Medication 40 MILLIGRAM(S): at 06:07

## 2023-09-29 RX ADMIN — OXYCODONE HYDROCHLORIDE 60 MILLIGRAM(S): 5 TABLET ORAL at 06:07

## 2023-09-29 NOTE — PROGRESS NOTE ADULT - NSPROGADDITIONALINFOA_GEN_ALL_CORE
Spoke about plan of care with patient and NOK son who is her HCP regarding the plan, they agree to continue to monitor
Discussed with patient's son regarding plan of care and planned consults
D/w ACP Brook Wisdom
Discharge Time: 35 minutes    OP Follow up with PMD
Discussed plan of care with INES Varghese regarding plan for treatment of UTI, pain control and dispo planning on Monday.

## 2023-09-29 NOTE — ED PROVIDER NOTE - NS ED MD DISPO DIVISION OBS
Returned patent's call, left message on machine.        PT CALLED-EGD RECALL   Received: Yesterday   Message Contents   AMOS Mcfarlane, A Gi Nurse Msg Pool             [phone]   EGD dx gastritis, duod ulcer         [facility]

## 2023-09-29 NOTE — PROGRESS NOTE ADULT - SUBJECTIVE AND OBJECTIVE BOX
Seen earlier today     Chief Complaint: Diabetes Mellitus follow up    INTERVAL HX: Tolerating POs with good appetite. Eats full meals. Most of BGs at goal except prelunch  today due to late breakfast. Discharge today     Review of Systems:  General: As above  GI: No nausea, vomiting  Endocrine: no  S&Sx of hypoglycemia    Allergies    wool- rash, itch (Other)  latex (Rash)  adhesives (Rash)  Bactrim (Flushing)    Intolerances      MEDICATIONS  (STANDING):  atorvastatin 80 milliGRAM(s) Oral at bedtime  budesonide 160 MICROgram(s)/formoterol 4.5 MICROgram(s) Inhaler 2 Puff(s) Inhalation two times a day  dextrose 5%. 1000 milliLiter(s) (50 mL/Hr) IV Continuous <Continuous>  dextrose 5%. 1000 milliLiter(s) (100 mL/Hr) IV Continuous <Continuous>  dextrose 50% Injectable 12.5 Gram(s) IV Push once  dextrose 50% Injectable 25 Gram(s) IV Push once  dextrose 50% Injectable 25 Gram(s) IV Push once  glucagon  Injectable 1 milliGRAM(s) IntraMuscular once  insulin glargine Injectable (LANTUS) 28 Unit(s) SubCutaneous at bedtime  insulin glargine Injectable (LANTUS) 30 Unit(s) SubCutaneous every morning  insulin lispro (ADMELOG) corrective regimen sliding scale   SubCutaneous <User Schedule>  insulin lispro (ADMELOG) corrective regimen sliding scale   SubCutaneous three times a day before meals  insulin lispro Injectable (ADMELOG) 20 Unit(s) SubCutaneous before dinner  insulin lispro Injectable (ADMELOG) 16 Unit(s) SubCutaneous before lunch  insulin lispro Injectable (ADMELOG) 20 Unit(s) SubCutaneous before breakfast    atorvastatin   80 milliGRAM(s) Oral (09-28-23 @ 21:36)    insulin glargine Injectable (LANTUS)   30 Unit(s) SubCutaneous (09-29-23 @ 12:29)    insulin glargine Injectable (LANTUS)   36 Unit(s) SubCutaneous (09-28-23 @ 21:37)    insulin lispro (ADMELOG) corrective regimen sliding scale   4 Unit(s) SubCutaneous (09-29-23 @ 12:29)   2 Unit(s) SubCutaneous (09-29-23 @ 09:00)    insulin lispro Injectable (ADMELOG)   20 Unit(s) SubCutaneous (09-28-23 @ 17:39)    insulin lispro Injectable (ADMELOG)   20 Unit(s) SubCutaneous (09-29-23 @ 09:01)        PHYSICAL EXAM:  VITALS: T(C): 36.4 (09-29-23 @ 11:51)  T(F): 97.5 (09-29-23 @ 11:51), Max: 98.2 (09-28-23 @ 19:49)  HR: 66 (09-29-23 @ 11:51) (66 - 73)  BP: 123/64 (09-29-23 @ 11:51) (109/51 - 134/74)  RR:  (18 - 18)  SpO2:  (94% - 97%)  Wt(kg): --  GENERAL: female laying in bed, in NAD  Respiratory: Respirations unlabored   Extremities: Warm, no edema  NEURO: Alert , appropriate     LABS:  POCT Blood Glucose.: 216 mg/dL (09-29-23 @ 12:09)  POCT Blood Glucose.: 152 mg/dL (09-29-23 @ 08:37)  POCT Blood Glucose.: 175 mg/dL (09-29-23 @ 01:46)  POCT Blood Glucose.: 156 mg/dL (09-28-23 @ 21:34)  POCT Blood Glucose.: 142 mg/dL (09-28-23 @ 16:59)  POCT Blood Glucose.: 113 mg/dL (09-28-23 @ 12:03)  POCT Blood Glucose.: 108 mg/dL (09-28-23 @ 08:27)  POCT Blood Glucose.: 99 mg/dL (09-28-23 @ 01:54)  POCT Blood Glucose.: 126 mg/dL (09-27-23 @ 21:25)  POCT Blood Glucose.: 134 mg/dL (09-27-23 @ 16:51)  POCT Blood Glucose.: 116 mg/dL (09-27-23 @ 12:17)  POCT Blood Glucose.: 97 mg/dL (09-27-23 @ 08:21)  POCT Blood Glucose.: 102 mg/dL (09-27-23 @ 02:42)  POCT Blood Glucose.: 102 mg/dL (09-26-23 @ 21:42)  POCT Blood Glucose.: 117 mg/dL (09-26-23 @ 17:05)                Thyroid Function Tests:  09-17 @ 16:14 TSH 3.50 FreeT4 -- T3 71 Anti TPO -- Anti Thyroglobulin Ab -- TSI --      A1C with Estimated Average Glucose Result: 7.2 % (09-17-23 @ 16:13)  A1C with Estimated Average Glucose Result: 6.5 % (08-31-23 @ 10:25)    Estimated Average Glucose: 160 mg/dL (09-17-23 @ 16:13)  Estimated Average Glucose: 140 mg/dL (08-31-23 @ 10:25)        Diet, Consistent Carbohydrate/No Snacks:   DASH/TLC Sodium & Cholesterol Restricted (DASH)  Edward(7 Gm Arginine/7 Gm Glut/1.2 Gm HMB     Qty per Day:  2 (09-22-23 @ 17:47) [Active]

## 2023-09-29 NOTE — PROGRESS NOTE ADULT - PROBLEM SELECTOR PROBLEM 6
HTN (hypertension)
HLD (hyperlipidemia)
HTN (hypertension)
HLD (hyperlipidemia)
HTN (hypertension)
HLD (hyperlipidemia)
HLD (hyperlipidemia)
HTN (hypertension)

## 2023-09-29 NOTE — PROGRESS NOTE ADULT - NUTRITIONAL ASSESSMENT
This patient has been assessed with a concern for Malnutrition and has been determined to have a diagnosis/diagnoses of Severe protein-calorie malnutrition and Morbid obesity (BMI > 40).    This patient is being managed with:   Diet Consistent Carbohydrate/No Snacks-  DASH/TLC {Sodium & Cholesterol Restricted} (DASH)  Edward(7 Gm Arginine/7 Gm Glut/1.2 Gm HMB     Qty per Day:  2  Entered: Sep 22 2023  5:47PM  
This patient has been assessed with a concern for Malnutrition and has been determined to have a diagnosis/diagnoses of Severe protein-calorie malnutrition and Morbid obesity (BMI > 40).    This patient is being managed with:   Diet Consistent Carbohydrate/No Snacks-  DASH/TLC {Sodium & Cholesterol Restricted} (DASH)  Edward(7 Gm Arginine/7 Gm Glut/1.2 Gm HMB     Qty per Day:  2  Entered: Sep 22 2023  5:47PM  
Diet, Consistent Carbohydrate/No Snacks:   DASH/TLC {Sodium & Cholesterol Restricted} (DASH)  Edward(7 Gm Arginine/7 Gm Glut/1.2 Gm HMB     Qty per Day:  2 (09-22-23 @ 17:47) [Active]
This patient has been assessed with a concern for Malnutrition and has been determined to have a diagnosis/diagnoses of Severe protein-calorie malnutrition and Morbid obesity (BMI > 40).    This patient is being managed with:   Diet DASH/TLC-  Sodium & Cholesterol Restricted  Edward(7 Gm Arginine/7 Gm Glut/1.2 Gm HMB     Qty per Day:  2  Supplement Feeding Modality:  Oral  Entered: Sep 20 2023  6:38PM  
Diet, Consistent Carbohydrate/No Snacks:   DASH/TLC {Sodium & Cholesterol Restricted} (DASH)  Edward(7 Gm Arginine/7 Gm Glut/1.2 Gm HMB     Qty per Day:  2 (09-22-23 @ 17:47) [Active]
Diet, Consistent Carbohydrate/No Snacks:   DASH/TLC {Sodium & Cholesterol Restricted} (DASH)  Edward(7 Gm Arginine/7 Gm Glut/1.2 Gm HMB     Qty per Day:  2 (09-22-23 @ 17:47) [Active]
This patient has been assessed with a concern for Malnutrition and has been determined to have a diagnosis/diagnoses of Severe protein-calorie malnutrition and Morbid obesity (BMI > 40).    This patient is being managed with:   Diet Consistent Carbohydrate/No Snacks-  DASH/TLC {Sodium & Cholesterol Restricted} (DASH)  Edward(7 Gm Arginine/7 Gm Glut/1.2 Gm HMB     Qty per Day:  2  Entered: Sep 22 2023  5:47PM  
Diet, Consistent Carbohydrate/No Snacks:   DASH/TLC {Sodium & Cholesterol Restricted} (DASH)  Edward(7 Gm Arginine/7 Gm Glut/1.2 Gm HMB     Qty per Day:  2 (09-22-23 @ 17:47) [Active]
Diet, Consistent Carbohydrate/No Snacks:   DASH/TLC {Sodium & Cholesterol Restricted} (DASH)  Edward(7 Gm Arginine/7 Gm Glut/1.2 Gm HMB     Qty per Day:  2 (09-22-23 @ 17:47) [Active]
This patient has been assessed with a concern for Malnutrition and has been determined to have a diagnosis/diagnoses of Severe protein-calorie malnutrition and Morbid obesity (BMI > 40).    This patient is being managed with:   Diet Consistent Carbohydrate/No Snacks-  DASH/TLC {Sodium & Cholesterol Restricted} (DASH)  Edward(7 Gm Arginine/7 Gm Glut/1.2 Gm HMB     Qty per Day:  2  Entered: Sep 22 2023  5:47PM  
This patient has been assessed with a concern for Malnutrition and has been determined to have a diagnosis/diagnoses of Morbid obesity (BMI > 40) and Severe protein-calorie malnutrition.    This patient is being managed with:   Diet DASH/TLC-  Sodium & Cholesterol Restricted  Edward(7 Gm Arginine/7 Gm Glut/1.2 Gm HMB     Qty per Day:  2  Supplement Feeding Modality:  Oral  Entered: Sep 20 2023  6:38PM  
This patient has been assessed with a concern for Malnutrition and has been determined to have a diagnosis/diagnoses of Severe protein-calorie malnutrition and Morbid obesity (BMI > 40).    This patient is being managed with:   Diet DASH/TLC-  Sodium & Cholesterol Restricted  Entered: Sep 18 2023  2:34PM  
This patient has been assessed with a concern for Malnutrition and has been determined to have a diagnosis/diagnoses of Severe protein-calorie malnutrition and Morbid obesity (BMI > 40).    This patient is being managed with:   Diet Consistent Carbohydrate/No Snacks-  DASH/TLC {Sodium & Cholesterol Restricted} (DASH)  Edward(7 Gm Arginine/7 Gm Glut/1.2 Gm HMB     Qty per Day:  2  Entered: Sep 22 2023  5:47PM  
This patient has been assessed with a concern for Malnutrition and has been determined to have a diagnosis/diagnoses of Severe protein-calorie malnutrition and Morbid obesity (BMI > 40).    This patient is being managed with:   Diet Consistent Carbohydrate/No Snacks-  DASH/TLC {Sodium & Cholesterol Restricted} (DASH)  Edward(7 Gm Arginine/7 Gm Glut/1.2 Gm HMB     Qty per Day:  2  Entered: Sep 22 2023  5:47PM  
This patient has been assessed with a concern for Malnutrition and has been determined to have a diagnosis/diagnoses of Severe protein-calorie malnutrition and Morbid obesity (BMI > 40).    This patient is being managed with:   Diet DASH/TLC-  Sodium & Cholesterol Restricted  Edward(7 Gm Arginine/7 Gm Glut/1.2 Gm HMB     Qty per Day:  2  Supplement Feeding Modality:  Oral  Entered: Sep 20 2023  6:38PM  
This patient has been assessed with a concern for Malnutrition and has been determined to have a diagnosis/diagnoses of Severe protein-calorie malnutrition and Morbid obesity (BMI > 40).    This patient is being managed with:   Diet Consistent Carbohydrate/No Snacks-  DASH/TLC {Sodium & Cholesterol Restricted} (DASH)  Edward(7 Gm Arginine/7 Gm Glut/1.2 Gm HMB     Qty per Day:  2  Entered: Sep 22 2023  5:47PM

## 2023-09-29 NOTE — PROGRESS NOTE ADULT - ASSESSMENT
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 Endocrine following for type 2 DM with hyperglycemia management. BG Goal 100-180mg/dl. Tolerating POs, eats full meals. BGs mostly at goal on current regimen except pre-lunch  due to late breakfast and fasting  while fasting BG Goal 100-140 mg/dl     Home Regimen: Toujeo 86 units in am and 80 units at HS, Novolog 23 units TID.   A1C: 7.2  No endocrinologist

## 2023-09-29 NOTE — PROGRESS NOTE ADULT - PROBLEM SELECTOR PLAN 8
DVT ppx: On Eliquis.  Diet: DASH/CC  Dispo: PT recommends ROSE.
Noted on CT  Outpt f/u  Son is aware of CT findings
Noted on CT  Outpt f/u  Son is aware of CT findings.
Noted on CT  Outpt f/u  Son is aware of CT findings
Noted on CT  Outpt f/u  Son is aware of CT findings
DVT ppx: On Eliquis.  Diet: DASH/CC  Dispo: PT recommends ROSE
Noted on CT  Outpt f/u  Son is aware of CT findings
DVT ppx: On Eliquis.  Diet: DASH/CC  Dispo: Pending clinical improvement
DVT ppx: On Eliquis.  Diet: DASH/CC  Dispo: Pending clinical improvement.

## 2023-09-29 NOTE — PROGRESS NOTE ADULT - PROBLEM SELECTOR PLAN 7
Noted on CT  Outpt f/u  Son is aware of CT findings.
Continue Atorvastatin 80mg qhs
Continue  Atorvastatin 80mg qhs
Continue  Atorvastatin 80mg qhs.
Continue  Atorvastatin 80mg qhs
Noted on CT  Outpt f/u  Son is aware of CT findings
Noted on CT  Outpt f/u  Son is aware of CT findings
Noted on CT  Outpt f/u  Son is aware of CT findings.
Continue  Atorvastatin 80mg qhs.
Continue Atorvastatin 80mg qhs
Continue Atorvastatin 80mg qhs

## 2023-09-29 NOTE — PROGRESS NOTE ADULT - PROBLEM SELECTOR PLAN 6
Continue  Atorvastatin 80mg qhs
Continue  home Lasix 40mg PO qd.
Continue  home Lasix 40mg PO qd
Continue  Atorvastatin 80mg qhs
Continue  home Lasix 40mg PO qd.
Continue home Lasix 40mg PO qd
Continue  Atorvastatin 80mg qhs.
Continue  Atorvastatin 80mg qhs.
Continue home Lasix 40mg PO qd

## 2023-09-29 NOTE — PROGRESS NOTE ADULT - ASSESSMENT
69yo F w/ PMH of HTN, HLD, DM2, HFpEF, CKD III, DVT, Uterine CA and recent hospitalization at [facility] from 08/30-09/08 for dyspnea thought likely 2/2 PE and severe pulm HTN, now on Eliquis presents with rectal pain likely in setting of constipation due to opioids. Also managing diabetes with episodes of hypoglycemia. Patient recommended for ROSE and currently in discharge planning process.

## 2023-09-29 NOTE — PROGRESS NOTE ADULT - PROBLEM SELECTOR PLAN 1
- Test BGs ACTID and at HS  - Continue with Lantus 30 units in am and decrease Lantus to 46 units at HS.   - Continue with premeal Admelog 20 units before breakfast- 16 units before lunch - 20 units before dinner, Hold if NPO or eating < 50% of meals  - Continue with Mod correction scale qac + bedtime and add 2 am correction scale to monitor hypoglycemia   - please keep hypoglycemia protocol in place   - Continue consistent carbohydrate diet   - RD consult     Discharge planning:   - Plan to discharge on basal and bolus regimen. Doses will be determined based on insulin requirement at the time of the discharge.  - She is a candidate for GLP1RA for weight loss benefit, however will defer as outpatient because she has GI issues such as nausea, constipation at this time. patient to follow up with PCP and endocrinologist to start GLP1RA.   - Outpt. endo follow-up ( she dose not have endocrinologist, PCP manages her insulin doses )  - Can follow up with [facility] - [address] [place] [address], [address]. [place] [address]  Tel) [phone]   - Outpt. optho, podiatry, nephrology
- Abd pain (intermittent) and rectal pain w/out reported BM in 2 weeks  Potential cause may be secondary to high dose of opioids patient takes for pain  Stool on CT scan consistent for  constipation. Surgery sites, cholecystectomy, appendectomy, hysterectomy with no issue.   Rectal pain may also be d/t anal fissure vs thrombosed hemorrhoids  Continue  bowel regimen given she is also on chronic opioids   Continue  Nitroglycerin ointment rectally BID for possible fissure vs thrombosed hemorrhoids.  Colorectal surgery consulted-->  No acute surgical intervention,  possible anal fissure   Recommend Metamucil and/or Colace to avoid hard stools , increased fiber intake, topical Nifedipine ointment   Sitz baths (plain warm water) at least four times a day and after every bowel movement for 15-20min each. Avoid toilet paper after a bowel movement, use soap and water or unscented wipes.  Outpatient follow up with Dr. Holman.  Chronic pain management consulted--> Recommend Oxycodone q6hrs PRN x 4-5 days. Lidocaine suppositories at home
- Abd pain (intermittent) and rectal pain w/out reported BM in 2 weeks  Potential cause may be secondary to high dose of opioids patient takes for pain  Stool on CT scan consistent for  constipation. Surgery sites, cholecystectomy, appendectomy, hysterectomy with no issue.   Rectal pain may also be d/t anal fissure vs thrombosed hemorrhoids  Continue  bowel regimen given she is also on chronic opioids   Continue Nitroglycerin ointment rectally BID for possible fissure vs thrombosed hemorrhoids  Colorectal surgery consulted & rec OP follow up  Stool softeners  On discharge will prescribe rectal nifedipine  Continue with home opioids with breakthrough
- Abd pain (intermittent) and rectal pain w/out reported BM in 2 weeks  Potential cause may be secondary to high dose of opioids patient takes for pain  Stool on CT scan consistent for  constipation. Surgery sites, cholecystectomy, appendectomy, hysterectomy with no issue.   Rectal pain may also be d/t anal fissure vs thrombosed hemorrhoids  Continue  bowel regimen given she is also on chronic opioids   Continue Nitroglycerin ointment rectally BID for possible fissure vs thrombosed hemorrhoids  Colorectal surgery consulted & rec OP follow up  Stool softeners  On discharge will prescribe topical nifedipine  Continue with home opioids with breakthrough
- Abd pain (intermittent) and rectal pain w/out reported BM in 2 weeks  Potential cause may be secondary to high dose of opioids patient takes for pain--> considered Methylnatrexone to give inpatient, but patient had bowel movement this morning, will continue to monitor.   Stool on CT scan consistent for  constipation. Surgery sites, cholecystectomy, appendectomy, hysterectomy with no issue.   Rectal pain may also be d/t anal fissure vs thrombosed hemorrhoids  Continue  bowel regimen given she is also on chronic opioids   Continue  Nitroglycerin ointment rectally BID for possible fissure vs thrombosed hemorrhoids.  Colorectal surgery consulted-->  No acute surgical intervention,  possible anal fissure   Recommend Metamucil and/or Colace to avoid hard stools , increased fiber intake, topical Nifedipine ointment   Sitz baths (plain warm water) at least four times a day and after every bowel movement for 15-20min each. Avoid toilet paper after a bowel movement, use soap and water or unscented wipes.  Outpatient follow up with Dr. Holman.  Chronic pain management consulted--> Recommend Oxycodone q6hrs PRN x 4-5 days. Lidocaine suppositories at home.
- Test BGs ACTID and at HS  - Continue with Lantus 30 units in am and decrease Lantus to 40 units at HS.   - Continue with premeal Admelog 20 units before breakfast- 16 units before lunch - 20 units before dinner, Hold if NPO or eating < 50% of meals  - Continue with Mod correction scale qac + bedtime and add 2 am correction scale to monitor hypoglycemia   - please keep hypoglycemia protocol in place   - Continue consistent carbohydrate diet   - RD consult     Discharge planning:   - patient requesting cooper on discharge, tried to download pilo on phone however unable,  awaiting assistance from son to download pilo on phone  - Plan to discharge on basal and bolus regimen. Doses will be determined based on insulin requirement at the time of the discharge.  - She is a candidate for GLP1RA for weight loss benefit, however will defer as outpatient because she has GI issues such as nausea, constipation at this time. patient to follow up with PCP and endocrinologist to start GLP1RA.   - Outpt. endo follow-up ( she dose not have endocrinologist, PCP manages her insulin doses )  - Can follow up with [facility] - [address] [place] [address], [address]. [place] [address]  Tel) [phone]   - Outpt. optho, podiatry, nephrology
- Add Lantus 30 units in am ( given this pm ) and continue with Lantus 60 units at HS as no hypoglycemia today after 120 units of Lantus   - Continue with premeal Admelog 20 units qac, Hold if NPO or eating < 50% of meals  - Continue with Mod correction scale qac + bedtime and add 2 am correction scale to monitor hypoglycemia   - please keep hypoglycemia protocol in place   - Continue consistent carbohydrate diet   - RD consult   Discharge planning:   Outpt. endo follow-up ( she dose not have endocrinologist, PCp manages her insulin doses )  Can follow up with [facility] - [address] [place] [address], [address]. [place] [address]  Tel) [phone]   Outpt. optho, podiatry, nephrology  Plan to d/c on basal bolus.
- Test BGs ACTID and at HS  - Continue with Lantus 30 units in am and decrease Lantus to 50 units at HS.   - Adjust premeal Admelog 20 units before breakfast- 16 units before lunch - 20 units before dinner, Hold if NPO or eating < 50% of meals  - Continue with Mod correction scale qac + bedtime and add 2 am correction scale to monitor hypoglycemia   - please keep hypoglycemia protocol in place   - Continue consistent carbohydrate diet   - RD consult     Discharge planning:   - Plan to discharge on basal and bolus regimen. Doses will be determined based on insulin requirement at the time of the discharge.  - She is a candidate for GLP1RA for weight loss benefit, however will defer as outpatient because she has GI issues such as nausea, constipation at this time. patient to follow up with PCP and endocrinologist to start GLP1RA.   - Outpt. endo follow-up ( she dose not have endocrinologist, PCP manages her insulin doses )  - Can follow up with [facility] - [address], [address]. [place] [address]  Tel) [phone]   - Outpt. optho, podiatry, nephrology
- Test BGs ACTID and at HS  - Continue with Lantus 30 units in am and increase Lantus to 38 units at HS.   - Continue with premeal Admelog 20 units before breakfast- 16 units before lunch - 20 units before dinner, Hold if NPO or eating < 50% of meals  - Continue with Mod correction scale qac + bedtime and add 2 am correction scale to monitor hypoglycemia   - please keep hypoglycemia protocol in place   - Continue consistent carbohydrate diet   - RD consult     Discharge planning:   - Recommend to discharge on basal and bolus regimen- Lantus 30 units in am and Lantus 38 units at HS,  and  premeal Novolog 20 units before breakfast - 16 units before lunch - 20 units before dinner    - Patient to check BGs ACTID and at HS, patient to contact her primary care provider if BG > 400X1, < 70 X1 and >200 X2 days for insulin dose adjustment  - Freestyle Libre3 sample provided and applied  - She is a candidate for GLP1RA for weight loss benefit, however will defer as outpatient because she has GI issues such as nausea, constipation at this time. patient to follow up with PCP and endocrinologist to start GLP1RA.   - Outpt. endo follow-up ( she dose not have endocrinologist, PCP manages her insulin doses )  - Can follow up with [facility] - [address], [address]. [place] [address]  Tel) [phone] . or she can call [phone] to find a endocrinologist who is close to her.   - Outpt. optho, podiatry, nephrology
- Abd pain (intermittent) and rectal pain w/out reported BM in 2 weeks  Potential cause may be secondary to high dose of opioids patient takes for pain--> considered Methylnatrexone to give inpatient, but patient had bowel movement this morning, will continue to monitor.   Stool on CT scan consistent for  constipation. Surgery sites, cholecystectomy, appendectomy, hysterectomy with no issue.   Rectal pain may also be d/t anal fissure vs thrombosed hemorrhoids  Continue  bowel regimen given she is also on chronic opioids   Continue  Nitroglycerin ointment rectally BID for possible fissure vs thrombosed hemorrhoids.  Colorectal surgery consulted-->  No acute surgical intervention,  possible anal fissure   Recommend Metamucil and/or Colace to avoid hard stools , increased fiber intake, topical Nifedipine ointment   Sitz baths (plain warm water) at least four times a day and after every bowel movement for 15-20min each. Avoid toilet paper after a bowel movement, use soap and water or unscented wipes.  Outpatient follow up with Dr. Holman
- Abd pain (intermittent) and rectal pain w/out reported BM in 2 weeks  Potential cause may be secondary to high dose of opioids patient takes for pain--> considered Methylnatrexone to give inpatient, but patient had bowel movement this morning, will continue to monitor.   Stool on CT scan consistent for  constipation. Surgery sites, cholecystectomy, appendectomy, hysterectomy with no issue.   Rectal pain may also be d/t anal fissure vs thrombosed hemorrhoids  Continue  bowel regimen given she is also on chronic opioids   Continue  Nitroglycerin ointment rectally BID for possible fissure vs thrombosed hemorrhoids.  Rectal pain not improved will do  CRS consult.
- Abd pain (intermittent) and rectal pain w/out reported BM in 2 weeks  Potential cause may be secondary to high dose of opioids patient takes for pain--> considered Methylnatrexone to give inpatient, but patient had bowel movement this morning, will continue to monitor.   Stool on CT scan consistent for  constipation. Surgery sites, cholecystectomy, appendectomy, hysterectomy with no issue.   Rectal pain may also be d/t anal fissure vs thrombosed hemorrhoids  Continue  bowel regimen given she is also on chronic opioids   Continue  Nitroglycerin ointment rectally BID for possible fissure vs thrombosed hemorrhoids.   If rectal pain not improved can consider  possible CRS consult
- Abd pain (intermittent) and rectal pain w/out reported BM in 2 weeks  Potential cause may be secondary to high dose of opioids patient takes for pain--> considered Methylnatrexone to give inpatient, but patient had bowel movement this morning, will continue to monitor.   Stool on CT scan consistent for  constipation. Surgery sites, cholecystectomy, appendectomy, hysterectomy with no issue.   Rectal pain may also be d/t anal fissure vs thrombosed hemorrhoids  Continue  bowel regimen given she is also on chronic opioids   Continue  Nitroglycerin ointment rectally BID for possible fissure vs thrombosed hemorrhoids.  Colorectal surgery consulted-->  No acute surgical intervention,  possible anal fissure   Recommend Metamucil and/or Colace to avoid hard stools , increased fiber intake, topical Nifedipine ointment   Sitz baths (plain warm water) at least four times a day and after every bowel movement for 15-20min each. Avoid toilet paper after a bowel movement, use soap and water or unscented wipes.  Outpatient follow up with Dr. Holman.  Patient to see chronic pain management service.
- Abd pain (intermittent) and rectal pain w/out reported BM in 2 weeks  Potential cause may be secondary to high dose of opioids patient takes for pain--> considered Methylnatrexone to give inpatient, but patient had bowel movement this morning, will continue to monitor.   Stool on CT scan consistent for  constipation. Surgery sites, cholecystectomy, appendectomy, hysterectomy with no issue.   Rectal pain may also be d/t anal fissure vs thrombosed hemorrhoids  Continue  bowel regimen given she is also on chronic opioids   Continue  Nitroglycerin ointment rectally BID for possible fissure vs thrombosed hemorrhoids.  Colorectal surgery consulted-->  No acute surgical intervention,  possible anal fissure   Recommend Metamucil and/or Colace to avoid hard stools , increased fiber intake, topical Nifedipine ointment   Sitz baths (plain warm water) at least four times a day and after every bowel movement for 15-20min each. Avoid toilet paper after a bowel movement, use soap and water or unscented wipes.  Outpatient follow up with Dr. Holman.  Chronic pain management consulted--> Recommend Oxycodone q6hrs PRN x 4-5 days. Lidocaine suppositories at home
- Abd pain (intermittent) and rectal pain w/out reported BM in 2 weeks  Potential cause may be secondary to high dose of opioids patient takes for pain  Stool on CT scan consistent for  constipation. Surgery sites, cholecystectomy, appendectomy, hysterectomy with no issue.   Rectal pain may also be d/t anal fissure vs thrombosed hemorrhoids  Continue  bowel regimen given she is also on chronic opioids   Continue Nitroglycerin ointment rectally BID for possible fissure vs thrombosed hemorrhoids  Colorectal surgery consulted & rec OP follow up  Stool softeners  On discharge will prescribe topical nifedipine  Continue with home opioids with breakthrough
- Abd pain (intermittent) and rectal pain w/out reported BM in 2 weeks  Potential cause may be secondary to high dose of opioids patient takes for pain  Stool on CT scan consistent for  constipation. Surgery sites, cholecystectomy, appendectomy, hysterectomy with no issue.   Rectal pain may also be d/t anal fissure vs thrombosed hemorrhoids  Continue  bowel regimen given she is also on chronic opioids   Continue Nitroglycerin ointment rectally BID for possible fissure vs thrombosed hemorrhoids  Colorectal surgery consulted & rec OP follow up  Stool softeners  On discharge will prescribe topical nifedipine  Continue with home opioids with breakthrough

## 2023-09-29 NOTE — ED PROVIDER NOTE - OBJECTIVE STATEMENT
68 year old female with PMHx HTN, HLD, IDDM, HFpEF (on 4L via NC baseline), CKDIII, DVT, uterine CA BIBA from home for b/l LE weakness. Patient with recent admission and d/c today from [facility] (9/17-9/29/23) for rectal pain, treated for UTI with Ceftriaxone x3 days (9/23-9/25), currently being treated for suspected PE (diagnosed on recent admission to [facility] 8/30-9/13/23). States she was evaluated by PT at [facility], recommended for ROSE placement post-dc however due to past experience she declined ROSE, wanted to go home, and states that since being home today has felt too weak to ambulate with her walker which is her baseline, so called 911 and was brought here. Denies any new/worsening symptoms. Denies any chest pain, shortness of breath, abdominal pain, nausea, vomiting.

## 2023-09-29 NOTE — ED PROVIDER NOTE - CLINICAL SUMMARY MEDICAL DECISION MAKING FREE TEXT BOX
68 year old female recently d/c from [place] today with recommendation for ROSE, however declined, was d/c home, states was too weak to ambulate with walker, so came to ED. Patient has no acute medical complaints. Will obtain labs, consult SW for placement.

## 2023-09-29 NOTE — PROGRESS NOTE ADULT - PROBLEM SELECTOR PLAN 5
Continue  home Lasix 40mg PO qd
At baseline  Monitor renal fxn qd  Avoid nephrotoxic medications   Renally dose all medications.
Continue  home Lasix 40mg PO qd
At baseline  Monitor renal fxn qd  Avoid nephrotoxic medications  Renally dose all medications
Continue  home Lasix 40mg PO qd.
At baseline  Monitor renal fxn qd  Avoid nephrotoxic medications  Renally dose all medications
Continue  home Lasix 40mg PO qd.
At baseline  Monitor renal fxn qd  Avoid nephrotoxic medications  Renally dose all medications

## 2023-09-29 NOTE — PROGRESS NOTE ADULT - REASON FOR ADMISSION
Rectal Pain

## 2023-09-29 NOTE — ED PROVIDER NOTE - ATTENDING CONTRIBUTION TO CARE
68 year old female with PMHx HTN, HLD, IDDM, HFpEF (on 4L via NC baseline), CKDIII, DVT, uterine CA BIBA from home for b/l LE weakness. Pt with recent admission to [facility] where ROSE was recommended but she wanted to go home was home for less than 24 hours unable to perform ADLs generalized weakness, requiring SW/CM, PT eval for ROSE

## 2023-09-29 NOTE — ED ADULT TRIAGE NOTE - MODE OF ARRIVAL
Message from Dr Scott,  MD Winifred Quezada, RN   Winifred will reschedule ct scan with picc placement- after June 20th ([phone], winifred- his sister)      Call placed to JOSE/Mag for an appointment after June 20th for a PICC Line insertion.   The sister (Winifrde at [phone]) will be back off of vacation to help to coordinate.  The patient will then need at CT Scan Abd post insertion of the PICC Line.   Awaiting tentative appt date. DB   [facility]

## 2023-09-29 NOTE — PROGRESS NOTE ADULT - PROBLEM SELECTOR PLAN 4
- 3x2 cm right adrenal nodule no HU on the report  - Patient will need hormonal evaluation outpatient   - Check aldosterone and plasma renin activity due to history of HTN  - Check plasma metanephrine to rule out pheochromocytoma   - Check 1 mg DST for evaluation of cushing as well as DHEAS  - These evaluations are non urgent, but if patient will be admitted for a few more days, can send while inpatient      discussed with patient and primary team   Contact via Microsoft Teams during business hours  To reach covering provider access AMION via sunrise tools  For Urgent matters/after-hours/weekends/holidays please page endocrine fellow on call   For nonurgent matters please email [email or web address]    Please note that this patient may be followed by different provider tomorrow.  Notify endocrine 24 hours prior to discharge for final recommendations
Team recommending outpatient follow up    Jaqueline DAN   Contact via Microsoft Teams during business hours  To reach covering provider access AMION via sunrise tools  For Urgent matters/after-hours/weekends/holidays please page endocrine fellow on call   For nonurgent matters please email [email or web address]    Please note that this patient may be followed by different provider tomorrow.  Notify endocrine 24 hours prior to discharge for final recommendations
- 3x2 cm right adrenal nodule no HU on the report  - Patient will need hormonal evaluation outpatient   - Check aldosterone and plasma renin activity due to history of HTN  - Check plasma metanephrine to rule out pheochromocytoma   - Check 1 mg DST for evaluation of cushing as well as DHEAS  - These evaluations are non urgent, but if patient will be admitted for a few more days, can send while inpatient      Dilcia Payan MD  Attending Physician   Department of Endocrinology, Diabetes and Metabolism   Microsoft Teams     If before 9AM or after 5PM, or on weekends/holidays, please call the Endocrine answering service for assistance ([phone]).  For nonurgent matters, please email [email or web address] for assistance.
- Lake w/ elevated enzymes at [facility] on recent admission (8/30--9/13) w/ suspected PE at the time  - s/p chart review of [facility] records (8/30--9/13): due to patient weight exceeds wt limit for CT chest, also unable to use V/Q scan or NM stress test due to her weight , empiric full dose AC was initiated on [facility]  - VA duplex 8/31 reviewed, markedly limited visualization , no evidence of DVT in either lower extremities   - she was discharged from [facility] on Eliquis 5mg BID   - c/w Eliquis 5mg BID (bleeding risk seems minimal)
- 3x2 cm right adrenal nodule no HU on the report  - Patient will need hormonal evaluation outpatient   - Check aldosterone and plasma renin activity due to history of HTN  - Check plasma metanephrine to rule out pheochromocytoma   - Check 1 mg DST for evaluation of cushing as well as DHEAS  - These evaluations are non urgent, but if patient will be admitted for a few more days, can send while inpatient      discussed with patient and primary team   Contact via Microsoft Teams during business hours  To reach covering provider access AMION via sunrise tools  For Urgent matters/after-hours/weekends/holidays please page endocrine fellow on call   For nonurgent matters please email [email or web address]    Please note that this patient may be followed by different provider tomorrow.  Notify endocrine 24 hours prior to discharge for final recommendations
- Lake w/ elevated enzymes at [facility] on recent admission (8/30--9/13) w/ suspected PE at the time  - s/p chart review of [facility] records (8/30--9/13): due to patient weight exceeds wt limit for CT chest, also unable to use V/Q scan or NM stress test due to her weight , empiric full dose AC was initiated on [facility]  - VA duplex 8/31 reviewed, markedly limited visualization , no evidence of DVT in either lower extremities   - she was discharged from [facility] on Eliquis 5mg BID   - c/w Eliquis 5mg BID (bleeding risk seems minimal)
Team recommending outpatient follow up    Jaqueline DAN   Contact via Microsoft Teams during business hours  To reach covering provider access AMION via sunrise tools  For Urgent matters/after-hours/weekends/holidays please page endocrine fellow on call   For nonurgent matters please email [email or web address]    Please note that this patient may be followed by different provider tomorrow.  Notify endocrine 24 hours prior to discharge for final recommendations
- Lake w/ elevated enzymes at OSH on recent admission w/ suspected PE at the time  - Now on Eliquis 5mg BID, continue.
- [place] w/ elevated enzymes at [facility] on recent admission (8/30--9/13) w/ suspected PE at the time  - s/p chart review of [facility] records (8/30--9/13): due to patient weight exceeds wt limit for CT chest, also unable to use V/Q scan or NM stress test due to her weight , empiric full dose AC was initiated on [facility]  - VA duplex 8/31 reviewed, markedly limited visualization , no evidence of DVT in either lower extremities   - she was discharged from [facility] on Eliquis 5mg BID   - c/w Eliquis 5mg BID (bleeding risk seems minimal)
- [place] w/ elevated enzymes at [facility] on recent admission (8/30--9/13) w/ suspected PE at the time  - s/p chart review of [facility] records (8/30--9/13): due to patient weight exceeds wt limit for CT chest, also unable to use V/Q scan or NM stress test due to her weight , empiric full dose AC was initiated on [facility]  - VA duplex 8/31 reviewed, markedly limited visualization , no evidence of DVT in either lower extremities   - she was discharged from [facility] on eliquis 5mg BID   - c/w Eliquis 5mg BID
At baseline  Monitor renal fxn qd  Avoid nephrotoxic medications   Renally dose all medications.
- Lake w/ elevated enzymes at [facility] on recent admission (8/30--9/13) w/ suspected PE at the time  - s/p chart review of [facility] records (8/30--9/13): due to patient weight exceeds wt limit for CT chest, also unable to use V/Q scan or NM stress test due to her weight , empiric full dose AC was initiated on [facility]  - VA duplex 8/31 reviewed, markedly limited visualization , no evidence of DVT in either lower extremities   - she was discharged from [facility] on Eliquis 5mg BID   - c/w Eliquis 5mg BID (bleeding risk seems minimal)
At baseline  Monitor renal fxn qd  Avoid nephrotoxic medications   Renally dose all medications
At baseline  Monitor renal fxn qd  Avoid nephrotoxic medications   Renally dose all medications
- [place] w/ elevated enzymes at OSH on recent admission w/ suspected PE at the time  - Now on Eliquis 5mg BID, continue
At baseline  Monitor renal fxn qd  Avoid nephrotoxic medications   Renally dose all medications.

## 2023-09-29 NOTE — PROGRESS NOTE ADULT - PROBLEM SELECTOR PROBLEM 5
HTN (hypertension)
Stage 3 chronic kidney disease
HTN (hypertension)
HTN (hypertension)
Stage 3 chronic kidney disease
Stage 3 chronic kidney disease
HTN (hypertension)
Stage 3 chronic kidney disease

## 2023-09-29 NOTE — PROGRESS NOTE ADULT - PROBLEM SELECTOR PLAN 3
- Lake w/ elevated enzymes at OSH on recent admission w/ suspected PE at the time  - Now on Eliquis 5mg BID, continue.
Continue with Atorvastatin 80 mg daily   LDL goal < 70 in patient with diabetes
On insulin at home, taking daily but has not been eating appropriately the past two weeks  Hypoglycemia resolved   Endocrinology team following, currently on Lantus 30units qAM, and 50 units qPM, with 20U Admelog TID with meals   A1c 7.2
On insulin at home, taking daily but has not been eating appropriately the past two weeks   On D10+NS for now, will continue   Monitor FS q4hrs  Patient with sugars improved after fluids  Will monitor intake  Patient with improved intake at this time.   Seen by Endocrinology recommend 60units basal Lantus, with 20U Admelog AC
Continue with Atorvastatin 80 mg daily   LDL goal < 70 in patient with diabetes
On insulin at home, taking daily but has not been eating appropriately the past two weeks  Hypoglycemia resolved   A1c 7.2  Endocrine actively adjusting basal-bolus
On insulin at home, taking daily but has not been eating appropriately the past two weeks  Hypoglycemia resolved   A1c 7.2  Endocrine actively adjusting basal-bolus  Likely discharge on our regimen here (patient aware)
On insulin at home, taking daily but has not been eating appropriately the past two weeks   On D10+NS for now, will continue   Monitor FS q4hrs  Patient with sugars improved after fluids  Will monitor intake  Patient with improved intake at this time.   Seen by Endocrinology recommend 30Units AM insulin and 60units basal Lantus qhs , with 20U Admelog AC
- [place] w/ elevated enzymes at OSH on recent admission w/ suspected PE at the time  - Now on Eliquis 5mg BID, continue
Continue with Atorvastatin 80 mg daily   LDL goal < 70 in patient with diabetes
- [place] w/ elevated enzymes at OSH on recent admission w/ suspected PE at the time  - Now on Eliquis 5mg BID, continue
On insulin at home, taking daily but has not been eating appropriately the past two weeks  Hypoglycemia resolved   A1c 7.2  Endocrine actively adjusting basal-bolus  Likely discharge on our regimen here (patient aware)
On insulin at home, taking daily but has not been eating appropriately the past two weeks  Hypoglycemia resolved   A1c 7.2  Endocrine actively adjusting basal-bolus  Likely discharge on our regimen here (patient aware)
- [place] w/ elevated enzymes at OSH on recent admission w/ suspected PE at the time  - Now on Eliquis 5mg BID, continue.

## 2023-09-29 NOTE — ED PROVIDER NOTE - PHYSICAL EXAMINATION
Gen: morbidly obese female, in no acute distress  Head: normocephalic, atraumatic  EENT: EOMI, PERRLA, moist mucous membranes, no scleral icterus, no JVD  Lung: no increased work of breathing, clear to auscultation bilaterally, speaking in full sentences  CV: regular rate, regular rhythm  Abd: soft, non-tender, non-distended  MSK: (+) b/l LE with purple discoloration from calves to toes (pt states is baseline), (+)pitting edema, nontender to palpation.  Neuro: Awake, alert, no focal neurologic deficits  Skin: No obvious rash, no jaundice  Psych: normal affect, normal speech

## 2023-09-29 NOTE — PROGRESS NOTE ADULT - PROBLEM SELECTOR PROBLEM 3
Suspected pulmonary embolism
HLD (hyperlipidemia)
HLD (hyperlipidemia)
Type 2 diabetes mellitus with hypoglycemia
HLD (hyperlipidemia)
Type 2 diabetes mellitus with hypoglycemia
Suspected pulmonary embolism
Type 2 diabetes mellitus with hypoglycemia
Suspected pulmonary embolism
Type 2 diabetes mellitus with hypoglycemia
Suspected pulmonary embolism
Type 2 diabetes mellitus with hypoglycemia

## 2023-09-29 NOTE — PROGRESS NOTE ADULT - PROBLEM SELECTOR PLAN 9
DVT ppx: On Eliquis.  Diet: DASH/CC  Dispo: PT recommends ROSE, Son and patient adamantly against ROSE. Consider PT re-eval in AM.
DVT ppx: On Eliquis.  Diet: DASH/CC  Dispo: PT recommends ROSE, Son and patient adamantly against ROSE.    Chronically on 4 L NC    Medically optimized for DC, pending hospital bed & Toan lift    Will provide Influenza vaccine on DC  We don't have COVID booster
DVT ppx: On Eliquis.  Diet: DASH/CC  Dispo: PT recommends ROSE, Son and patient adamantly against ROSE. Requested PT reeval
DVT ppx: On Eliquis.  Diet: DASH/CC  Dispo: PT recommends ROSE, Son and patient adamantly against ROSE. Requested PT reeval    Chronically on 4 L NC    Discussed with ACP/SW    Medically optimized for DC
DVT ppx: On Eliquis.  Diet: DASH/CC  Dispo: PT recommends ROSE.
DVT ppx: On Eliquis.  Diet: DASH/CC  Dispo: PT recommends ROSE, Son and patient adamantly against ROSE. Requested PT reeval    Chronically on 4 L NC    Discussed with ACP/SW    Will discuss with son Abimael
DVT ppx: On Eliquis.  Diet: DASH/CC  Dispo: PT recommends ROSE, Son and patient adamantly against ROSE.    Chronically on 4 L NC    Medically optimized for DC, pending hospital bed & Toan lift    Will provide Influenza vaccine on DC  We don't have COVID booster    Per patient son well versed in providing care and using Toan lift. No reason to suspect otherwise.

## 2023-09-29 NOTE — PROGRESS NOTE ADULT - PROBLEM SELECTOR PROBLEM 8
Prophylactic measure
Adrenal nodule
Prophylactic measure
Prophylactic measure
Adrenal nodule
Prophylactic measure

## 2023-09-29 NOTE — PROGRESS NOTE ADULT - PROBLEM SELECTOR PROBLEM 7
Adrenal nodule
HLD (hyperlipidemia)
Adrenal nodule
HLD (hyperlipidemia)
Adrenal nodule
HLD (hyperlipidemia)
Adrenal nodule
HLD (hyperlipidemia)
HLD (hyperlipidemia)

## 2023-09-29 NOTE — PROGRESS NOTE ADULT - PROBLEM SELECTOR PROBLEM 1
Rectal pain
Uncontrolled type 2 diabetes mellitus with hyperglycemia
Uncontrolled type 2 diabetes mellitus with hyperglycemia
Rectal pain
Uncontrolled type 2 diabetes mellitus with hyperglycemia
Uncontrolled type 2 diabetes mellitus with hyperglycemia
Rectal pain
Uncontrolled type 2 diabetes mellitus with hyperglycemia
Rectal pain

## 2023-09-29 NOTE — ED ADULT TRIAGE NOTE - CHIEF COMPLAINT QUOTE
patient d/c home from [facility] s/p admission for fall earlier today. states she got home and felt bilateral lower extremity weakness so she called 911. denies falling or injury. bariatric pt. hx of CHF, on 4LPM O@ NC baseline.

## 2023-09-29 NOTE — PROGRESS NOTE ADULT - PROBLEM SELECTOR PROBLEM 4
Stage 3 chronic kidney disease
Stage 3 chronic kidney disease
Adrenal nodule
Adrenal nodule
Stage 3 chronic kidney disease
Adrenal nodule
Suspected pulmonary embolism
Adrenal nodule
Suspected pulmonary embolism
Suspected pulmonary embolism
Adrenal nodule
Suspected pulmonary embolism
Stage 3 chronic kidney disease
Suspected pulmonary embolism

## 2023-09-29 NOTE — PROGRESS NOTE ADULT - SUBJECTIVE AND OBJECTIVE BOX
Patient is a 68y old  Female who presents with a chief complaint of Rectal Pain (29 Sep 2023 04:17)      SUBJECTIVE / OVERNIGHT EVENTS: Patient seen and examined at bedside. No acute events overnight. States she had a telehealth visit yesterday with her new PMD Hemal Ge. Denies abdominal pain. Having okay BM. Respiratory status unchanged.    MEDICATIONS  (STANDING):  acetaminophen     Tablet .. 650 milliGRAM(s) Oral every 6 hours  apixaban 5 milliGRAM(s) Oral every 12 hours  ascorbic acid 500 milliGRAM(s) Oral daily  aspirin enteric coated 81 milliGRAM(s) Oral daily  atorvastatin 80 milliGRAM(s) Oral at bedtime  budesonide 160 MICROgram(s)/formoterol 4.5 MICROgram(s) Inhaler 2 Puff(s) Inhalation two times a day  cadexomer iodine 0.9% Gel 1 Application(s) Topical daily  chlorhexidine 2% Cloths 1 Application(s) Topical <User Schedule>  dextrose 5%. 1000 milliLiter(s) (50 mL/Hr) IV Continuous <Continuous>  dextrose 5%. 1000 milliLiter(s) (100 mL/Hr) IV Continuous <Continuous>  dextrose 50% Injectable 25 Gram(s) IV Push once  dextrose 50% Injectable 12.5 Gram(s) IV Push once  dextrose 50% Injectable 25 Gram(s) IV Push once  furosemide    Tablet 40 milliGRAM(s) Oral daily  glucagon  Injectable 1 milliGRAM(s) IntraMuscular once  influenza  Vaccine (HIGH DOSE) 0.7 milliLiter(s) IntraMuscular once  insulin glargine Injectable (LANTUS) 30 Unit(s) SubCutaneous every morning  insulin glargine Injectable (LANTUS) 36 Unit(s) SubCutaneous at bedtime  insulin lispro (ADMELOG) corrective regimen sliding scale   SubCutaneous <User Schedule>  insulin lispro (ADMELOG) corrective regimen sliding scale   SubCutaneous three times a day before meals  insulin lispro Injectable (ADMELOG) 20 Unit(s) SubCutaneous before dinner  insulin lispro Injectable (ADMELOG) 16 Unit(s) SubCutaneous before lunch  insulin lispro Injectable (ADMELOG) 20 Unit(s) SubCutaneous before breakfast  lidocaine 2% Gel 1 Application(s) Topical daily  loratadine 10 milliGRAM(s) Oral daily  Nephro-dipesh 1 Tablet(s) Oral daily  nitroglycerin  0.2% Ointment Compound 1 Application(s) Rectal two times a day  oxyCODONE  ER Tablet 60 milliGRAM(s) Oral every 12 hours  polyethylene glycol 3350 17 Gram(s) Oral two times a day  senna 2 Tablet(s) Oral at bedtime    MEDICATIONS  (PRN):  albuterol    90 MICROgram(s) HFA Inhaler 2 Puff(s) Inhalation every 6 hours PRN Shortness of Breath and/or Wheezing  artificial tears (preservative free) Ophthalmic Solution 1 Drop(s) Both EYES four times a day PRN Dry Eyes  bisacodyl Suppository 10 milliGRAM(s) Rectal daily PRN Constipation  dextrose Oral Gel 15 Gram(s) Oral once PRN Blood Glucose LESS THAN 70 milliGRAM(s)/deciliter  ondansetron Injectable 4 milliGRAM(s) IV Push every 8 hours PRN Nausea and/or Vomiting  oxyCODONE    IR 5 milliGRAM(s) Oral every 6 hours PRN Severe Pain (7 - 10)  psyllium Powder 1 Packet(s) Oral daily PRN constipation      CAPILLARY BLOOD GLUCOSE      POCT Blood Glucose.: 216 mg/dL (29 Sep 2023 12:09)  POCT Blood Glucose.: 152 mg/dL (29 Sep 2023 08:37)  POCT Blood Glucose.: 175 mg/dL (29 Sep 2023 01:46)  POCT Blood Glucose.: 156 mg/dL (28 Sep 2023 21:34)  POCT Blood Glucose.: 142 mg/dL (28 Sep 2023 16:59)    I&O's Summary    28 Sep 2023 07:01  -  29 Sep 2023 07:00  --------------------------------------------------------  IN: 360 mL / OUT: 1200 mL / NET: -840 mL        PHYSICAL EXAM:  Vital Signs Last 24 Hrs  T(C): 36.4 (29 Sep 2023 05:30), Max: 36.8 (28 Sep 2023 13:15)  T(F): 97.6 (29 Sep 2023 05:30), Max: 98.3 (28 Sep 2023 13:15)  HR: 66 (29 Sep 2023 05:30) (66 - 73)  BP: 134/74 (29 Sep 2023 05:30) (104/57 - 134/74)  BP(mean): --  RR: 18 (29 Sep 2023 05:30) (18 - 18)  SpO2: 97% (29 Sep 2023 05:30) (92% - 97%)    Parameters below as of 29 Sep 2023 05:30  Patient On (Oxygen Delivery Method): nasal cannula        GEN: female in NAD, appears comfortable, no diaphoresis  EYES: No scleral injection, EOMI  ENTM: neck supple & symmetric without tracheal deviation, moist membranes, no gross hearing impairment, thyroid gland not enlarged  CV: +S1/S2, no m/r/g, no abdominal bruit, no LE edema  RESP: breathing comfortably, no respiratory accessory muscle use, CTAB, no w/r/r  GI: normoactive BS, soft, NTND, no rebounding/guarding, no palpable masses    LABS:                      RADIOLOGY & ADDITIONAL TESTS:  Results Reviewed:   Imaging Personally Reviewed:  Electrocardiogram Personally Reviewed:    COORDINATION OF CARE:  Care Discussed with Consultants/Other Providers [Y/N]:  Prior or Outpatient Records Reviewed [Y/N]:

## 2023-09-29 NOTE — PROGRESS NOTE ADULT - PROBLEM SELECTOR PROBLEM 2
HTN (hypertension)
Type 2 diabetes mellitus with hypoglycemia
Type 2 diabetes mellitus with hypoglycemia
HTN (hypertension)
HTN (hypertension)
Acute UTI
Acute UTI
HTN (hypertension)
HTN (hypertension)
Acute UTI
Type 2 diabetes mellitus with hypoglycemia
Acute UTI
Type 2 diabetes mellitus with hypoglycemia
Acute UTI

## 2023-09-30 DIAGNOSIS — R53.1 WEAKNESS: ICD-10-CM

## 2023-09-30 LAB
ALBUMIN SERPL ELPH-MCNC: 4.1 G/DL — SIGNIFICANT CHANGE UP (ref 3.3–5.2)
ALP SERPL-CCNC: 190 U/L — HIGH (ref 40–120)
ALT FLD-CCNC: 39 U/L — HIGH
ANION GAP SERPL CALC-SCNC: 17 MMOL/L — SIGNIFICANT CHANGE UP (ref 5–17)
APPEARANCE UR: ABNORMAL
APTT BLD: 34.1 SEC — SIGNIFICANT CHANGE UP (ref 24.5–35.6)
AST SERPL-CCNC: 46 U/L — HIGH
BACTERIA # UR AUTO: ABNORMAL
BASOPHILS # BLD AUTO: 0.1 K/UL — SIGNIFICANT CHANGE UP (ref 0–0.2)
BASOPHILS NFR BLD AUTO: 0.9 % — SIGNIFICANT CHANGE UP (ref 0–2)
BILIRUB SERPL-MCNC: 0.8 MG/DL — SIGNIFICANT CHANGE UP (ref 0.4–2)
BILIRUB UR-MCNC: NEGATIVE — SIGNIFICANT CHANGE UP
BUN SERPL-MCNC: 45.1 MG/DL — HIGH (ref 8–20)
CALCIUM SERPL-MCNC: 10 MG/DL — SIGNIFICANT CHANGE UP (ref 8.4–10.5)
CHLORIDE SERPL-SCNC: 93 MMOL/L — LOW (ref 96–108)
CO2 SERPL-SCNC: 27 MMOL/L — SIGNIFICANT CHANGE UP (ref 22–29)
COLOR SPEC: YELLOW — SIGNIFICANT CHANGE UP
COMMENT - URINE: SIGNIFICANT CHANGE UP
CREAT SERPL-MCNC: 1.49 MG/DL — HIGH (ref 0.5–1.3)
DIFF PNL FLD: ABNORMAL
EGFR: 38 ML/MIN/1.73M2 — LOW
EOSINOPHIL # BLD AUTO: 0.16 K/UL — SIGNIFICANT CHANGE UP (ref 0–0.5)
EOSINOPHIL NFR BLD AUTO: 1.4 % — SIGNIFICANT CHANGE UP (ref 0–6)
EPI CELLS # UR: SIGNIFICANT CHANGE UP
GLUCOSE BLDC GLUCOMTR-MCNC: 150 MG/DL — HIGH (ref 70–99)
GLUCOSE BLDC GLUCOMTR-MCNC: 162 MG/DL — HIGH (ref 70–99)
GLUCOSE BLDC GLUCOMTR-MCNC: 207 MG/DL — HIGH (ref 70–99)
GLUCOSE BLDC GLUCOMTR-MCNC: 231 MG/DL — HIGH (ref 70–99)
GLUCOSE SERPL-MCNC: 172 MG/DL — HIGH (ref 70–99)
GLUCOSE UR QL: NEGATIVE MG/DL — SIGNIFICANT CHANGE UP
HCT VFR BLD CALC: 50.3 % — HIGH (ref 34.5–45)
HGB BLD-MCNC: 15.6 G/DL — HIGH (ref 11.5–15.5)
IMM GRANULOCYTES NFR BLD AUTO: 0.7 % — SIGNIFICANT CHANGE UP (ref 0–0.9)
INR BLD: 1.21 RATIO — HIGH (ref 0.85–1.18)
KETONES UR-MCNC: NEGATIVE — SIGNIFICANT CHANGE UP
LEUKOCYTE ESTERASE UR-ACNC: NEGATIVE — SIGNIFICANT CHANGE UP
LYMPHOCYTES # BLD AUTO: 1.45 K/UL — SIGNIFICANT CHANGE UP (ref 1–3.3)
LYMPHOCYTES # BLD AUTO: 12.4 % — LOW (ref 13–44)
MCHC RBC-ENTMCNC: 28 PG — SIGNIFICANT CHANGE UP (ref 27–34)
MCHC RBC-ENTMCNC: 31 GM/DL — LOW (ref 32–36)
MCV RBC AUTO: 90.3 FL — SIGNIFICANT CHANGE UP (ref 80–100)
MONOCYTES # BLD AUTO: 0.7 K/UL — SIGNIFICANT CHANGE UP (ref 0–0.9)
MONOCYTES NFR BLD AUTO: 6 % — SIGNIFICANT CHANGE UP (ref 2–14)
NEUTROPHILS # BLD AUTO: 9.21 K/UL — HIGH (ref 1.8–7.4)
NEUTROPHILS NFR BLD AUTO: 78.6 % — HIGH (ref 43–77)
NITRITE UR-MCNC: NEGATIVE — SIGNIFICANT CHANGE UP
PH UR: 6 — SIGNIFICANT CHANGE UP (ref 5–8)
PLATELET # BLD AUTO: 236 K/UL — SIGNIFICANT CHANGE UP (ref 150–400)
POTASSIUM SERPL-MCNC: 4.4 MMOL/L — SIGNIFICANT CHANGE UP (ref 3.5–5.3)
POTASSIUM SERPL-SCNC: 4.4 MMOL/L — SIGNIFICANT CHANGE UP (ref 3.5–5.3)
PROT SERPL-MCNC: 7.8 G/DL — SIGNIFICANT CHANGE UP (ref 6.6–8.7)
PROT UR-MCNC: 30 MG/DL
PROTHROM AB SERPL-ACNC: 13.4 SEC — HIGH (ref 9.5–13)
RBC # BLD: 5.57 M/UL — HIGH (ref 3.8–5.2)
RBC # FLD: 15.3 % — HIGH (ref 10.3–14.5)
RBC CASTS # UR COMP ASSIST: ABNORMAL /HPF (ref 0–4)
SARS-COV-2 RNA SPEC QL NAA+PROBE: SIGNIFICANT CHANGE UP
SODIUM SERPL-SCNC: 137 MMOL/L — SIGNIFICANT CHANGE UP (ref 135–145)
SP GR SPEC: 1.01 — SIGNIFICANT CHANGE UP (ref 1.01–1.02)
UROBILINOGEN FLD QL: NEGATIVE MG/DL — SIGNIFICANT CHANGE UP
WBC # BLD: 11.7 K/UL — HIGH (ref 3.8–10.5)
WBC # FLD AUTO: 11.7 K/UL — HIGH (ref 3.8–10.5)
WBC UR QL: ABNORMAL /HPF (ref 0–5)

## 2023-09-30 PROCEDURE — 99223 1ST HOSP IP/OBS HIGH 75: CPT

## 2023-09-30 RX ORDER — ATORVASTATIN CALCIUM 80 MG/1
80 TABLET, FILM COATED ORAL AT BEDTIME
Refills: 0 | Status: DISCONTINUED | OUTPATIENT
Start: 2023-09-30 | End: 2023-10-04

## 2023-09-30 RX ORDER — INSULIN LISPRO 100/ML
VIAL (ML) SUBCUTANEOUS AT BEDTIME
Refills: 0 | Status: DISCONTINUED | OUTPATIENT
Start: 2023-09-30 | End: 2023-10-04

## 2023-09-30 RX ORDER — OXYCODONE HYDROCHLORIDE 5 MG/1
60 TABLET ORAL EVERY 12 HOURS
Refills: 0 | Status: DISCONTINUED | OUTPATIENT
Start: 2023-09-30 | End: 2023-10-04

## 2023-09-30 RX ORDER — DEXTROSE 50 % IN WATER 50 %
25 SYRINGE (ML) INTRAVENOUS ONCE
Refills: 0 | Status: DISCONTINUED | OUTPATIENT
Start: 2023-09-30 | End: 2023-10-04

## 2023-09-30 RX ORDER — POLYETHYLENE GLYCOL 3350 17 G/17G
17 POWDER, FOR SOLUTION ORAL DAILY
Refills: 0 | Status: DISCONTINUED | OUTPATIENT
Start: 2023-09-30 | End: 2023-10-04

## 2023-09-30 RX ORDER — ONDANSETRON 8 MG/1
4 TABLET, FILM COATED ORAL EVERY 8 HOURS
Refills: 0 | Status: DISCONTINUED | OUTPATIENT
Start: 2023-09-30 | End: 2023-10-04

## 2023-09-30 RX ORDER — ROSUVASTATIN CALCIUM 5 MG/1
1 TABLET ORAL
Qty: 0 | Refills: 0 | DISCHARGE

## 2023-09-30 RX ORDER — ASPIRIN/CALCIUM CARB/MAGNESIUM 324 MG
1 TABLET ORAL
Qty: 0 | Refills: 0 | DISCHARGE

## 2023-09-30 RX ORDER — SODIUM CHLORIDE 9 MG/ML
1000 INJECTION, SOLUTION INTRAVENOUS
Refills: 0 | Status: DISCONTINUED | OUTPATIENT
Start: 2023-09-30 | End: 2023-10-04

## 2023-09-30 RX ORDER — LISINOPRIL 2.5 MG/1
5 TABLET ORAL DAILY
Refills: 0 | Status: DISCONTINUED | OUTPATIENT
Start: 2023-09-30 | End: 2023-09-30

## 2023-09-30 RX ORDER — APIXABAN 2.5 MG/1
5 TABLET, FILM COATED ORAL EVERY 12 HOURS
Refills: 0 | Status: DISCONTINUED | OUTPATIENT
Start: 2023-09-30 | End: 2023-10-04

## 2023-09-30 RX ORDER — INSULIN GLARGINE 100 [IU]/ML
30 INJECTION, SOLUTION SUBCUTANEOUS AT BEDTIME
Refills: 0 | Status: DISCONTINUED | OUTPATIENT
Start: 2023-09-30 | End: 2023-10-04

## 2023-09-30 RX ORDER — INFLUENZA VIRUS VACCINE 15; 15; 15; 15 UG/.5ML; UG/.5ML; UG/.5ML; UG/.5ML
0.7 SUSPENSION INTRAMUSCULAR ONCE
Refills: 0 | Status: COMPLETED | OUTPATIENT
Start: 2023-09-30 | End: 2023-10-04

## 2023-09-30 RX ORDER — ACETAMINOPHEN 500 MG
650 TABLET ORAL EVERY 4 HOURS
Refills: 0 | Status: DISCONTINUED | OUTPATIENT
Start: 2023-09-30 | End: 2023-10-04

## 2023-09-30 RX ORDER — LANOLIN ALCOHOL/MO/W.PET/CERES
3 CREAM (GRAM) TOPICAL AT BEDTIME
Refills: 0 | Status: DISCONTINUED | OUTPATIENT
Start: 2023-09-30 | End: 2023-10-04

## 2023-09-30 RX ORDER — INSULIN LISPRO 100/ML
VIAL (ML) SUBCUTANEOUS
Refills: 0 | Status: DISCONTINUED | OUTPATIENT
Start: 2023-09-30 | End: 2023-10-01

## 2023-09-30 RX ORDER — INSULIN GLARGINE 100 [IU]/ML
25 INJECTION, SOLUTION SUBCUTANEOUS EVERY MORNING
Refills: 0 | Status: DISCONTINUED | OUTPATIENT
Start: 2023-10-01 | End: 2023-10-01

## 2023-09-30 RX ORDER — DEXTROSE 50 % IN WATER 50 %
15 SYRINGE (ML) INTRAVENOUS ONCE
Refills: 0 | Status: DISCONTINUED | OUTPATIENT
Start: 2023-09-30 | End: 2023-10-04

## 2023-09-30 RX ORDER — FUROSEMIDE 40 MG
40 TABLET ORAL DAILY
Refills: 0 | Status: DISCONTINUED | OUTPATIENT
Start: 2023-09-30 | End: 2023-10-04

## 2023-09-30 RX ORDER — SENNA PLUS 8.6 MG/1
2 TABLET ORAL AT BEDTIME
Refills: 0 | Status: DISCONTINUED | OUTPATIENT
Start: 2023-09-30 | End: 2023-10-04

## 2023-09-30 RX ORDER — OXYCODONE HYDROCHLORIDE 5 MG/1
5 TABLET ORAL EVERY 6 HOURS
Refills: 0 | Status: DISCONTINUED | OUTPATIENT
Start: 2023-09-30 | End: 2023-10-04

## 2023-09-30 RX ORDER — INSULIN LISPRO 100/ML
VIAL (ML) SUBCUTANEOUS
Refills: 0 | Status: DISCONTINUED | OUTPATIENT
Start: 2023-09-30 | End: 2023-10-04

## 2023-09-30 RX ORDER — INSULIN LISPRO 100/ML
15 VIAL (ML) SUBCUTANEOUS
Refills: 0 | Status: DISCONTINUED | OUTPATIENT
Start: 2023-09-30 | End: 2023-10-04

## 2023-09-30 RX ORDER — LISINOPRIL 2.5 MG/1
1 TABLET ORAL
Refills: 0 | DISCHARGE

## 2023-09-30 RX ORDER — DEXTROSE 50 % IN WATER 50 %
12.5 SYRINGE (ML) INTRAVENOUS ONCE
Refills: 0 | Status: DISCONTINUED | OUTPATIENT
Start: 2023-09-30 | End: 2023-10-04

## 2023-09-30 RX ORDER — POLYETHYLENE GLYCOL 3350 17 G/17G
17 POWDER, FOR SOLUTION ORAL
Refills: 0 | DISCHARGE

## 2023-09-30 RX ORDER — GLUCAGON INJECTION, SOLUTION 0.5 MG/.1ML
1 INJECTION, SOLUTION SUBCUTANEOUS ONCE
Refills: 0 | Status: DISCONTINUED | OUTPATIENT
Start: 2023-09-30 | End: 2023-10-04

## 2023-09-30 RX ORDER — ALBUTEROL 90 UG/1
2 AEROSOL, METERED ORAL
Qty: 0 | Refills: 0 | DISCHARGE

## 2023-09-30 RX ORDER — MAGNESIUM HYDROXIDE 400 MG/1
30 TABLET, CHEWABLE ORAL DAILY
Refills: 0 | Status: DISCONTINUED | OUTPATIENT
Start: 2023-09-30 | End: 2023-10-04

## 2023-09-30 RX ADMIN — OXYCODONE HYDROCHLORIDE 60 MILLIGRAM(S): 5 TABLET ORAL at 17:34

## 2023-09-30 RX ADMIN — Medication 40 MILLIGRAM(S): at 06:10

## 2023-09-30 RX ADMIN — POLYETHYLENE GLYCOL 3350 17 GRAM(S): 17 POWDER, FOR SOLUTION ORAL at 17:30

## 2023-09-30 RX ADMIN — Medication 2: at 06:11

## 2023-09-30 RX ADMIN — INSULIN GLARGINE 30 UNIT(S): 100 INJECTION, SOLUTION SUBCUTANEOUS at 21:28

## 2023-09-30 RX ADMIN — OXYCODONE HYDROCHLORIDE 5 MILLIGRAM(S): 5 TABLET ORAL at 06:08

## 2023-09-30 RX ADMIN — Medication 4: at 17:30

## 2023-09-30 RX ADMIN — LISINOPRIL 5 MILLIGRAM(S): 2.5 TABLET ORAL at 06:08

## 2023-09-30 RX ADMIN — OXYCODONE HYDROCHLORIDE 5 MILLIGRAM(S): 5 TABLET ORAL at 21:24

## 2023-09-30 RX ADMIN — APIXABAN 5 MILLIGRAM(S): 2.5 TABLET, FILM COATED ORAL at 06:08

## 2023-09-30 RX ADMIN — Medication 15 UNIT(S): at 17:31

## 2023-09-30 RX ADMIN — SENNA PLUS 2 TABLET(S): 8.6 TABLET ORAL at 21:24

## 2023-09-30 RX ADMIN — OXYCODONE HYDROCHLORIDE 60 MILLIGRAM(S): 5 TABLET ORAL at 16:34

## 2023-09-30 RX ADMIN — ATORVASTATIN CALCIUM 80 MILLIGRAM(S): 80 TABLET, FILM COATED ORAL at 21:25

## 2023-09-30 RX ADMIN — Medication 4: at 14:04

## 2023-09-30 RX ADMIN — APIXABAN 5 MILLIGRAM(S): 2.5 TABLET, FILM COATED ORAL at 17:30

## 2023-09-30 RX ADMIN — OXYCODONE HYDROCHLORIDE 5 MILLIGRAM(S): 5 TABLET ORAL at 22:24

## 2023-09-30 NOTE — ED CDU PROVIDER DISPOSITION NOTE - CLINICAL COURSE
68 year old female with PMHx HTN, HLD, IDDM, HFpEF (on 4L via NC baseline), CKDIII, DVT, uterine CA BIBA from home for b/l LE weakness. Patient with recent admission and d/c today from [facility] (9/17-9/29/23) for rectal pain, treated for UTI with Ceftriaxone x3 days (9/23-9/25), currently being treated for suspected PE (diagnosed on recent admission to [facility] 8/30-9/13/23). States she was evaluated by PT at [facility], recommended for ROSE placement post-dc however due to past experience she declined ROSE, wanted to go home, and states that since being home today has felt too weak to ambulate with her walker which is her baseline, so called 911 and was brought here. Denies any new/worsening symptoms. Denies any chest pain, shortness of breath, abdominal pain, nausea, vomiting.   While in ED PT evaluated patient now amendable to ROSE.  Spoke to , unable to place til Monday due to insurance barriers.  Case to be discussed with hospitalist.

## 2023-09-30 NOTE — ED CDU PROVIDER INITIAL DAY NOTE - ATTENDING CONTRIBUTION TO CARE
68 year old female with PMHx HTN, HLD, IDDM, HFpEF (on 4L via NC baseline), CKDIII, DVT, uterine CA BIBA from home for b/l LE weakness. Pt with recent admission to [facility] where ROSE was recommended but she wanted to go home was home for less than 24 hours unable to perform ADLs generalized weakness, requiring SW/CM, PT eval for ROSE

## 2023-09-30 NOTE — ED CDU PROVIDER DISPOSITION NOTE - ATTENDING CONTRIBUTION TO CARE
I agree with the PA's note and was available for any issues/concerns. I was directly involved in patient care. My brief overall assessment is as follows:    ROSE evaluation reviewed; will admit

## 2023-09-30 NOTE — PHYSICAL THERAPY INITIAL EVALUATION ADULT - ADDITIONAL COMMENTS
Pt lives with her 2 sons in a PH 7 steps to enter however with ramp access, her 1 son is primary caregiver her other son requires assist and has a HHA of his own. Pt's baseline is ambulatory for very short distances with RW

## 2023-09-30 NOTE — ED CDU PROVIDER INITIAL DAY NOTE - CLINICAL SUMMARY MEDICAL DECISION MAKING FREE TEXT BOX
68 year old female recently d/c from [place] today with recommendation for ROSE, however declined, was d/c home, states was too weak to ambulate with walker, so came to ED. Patient has no acute medical complaints. placed in obs for ROSE placement. ordered PT and SW consultation.

## 2023-09-30 NOTE — PATIENT PROFILE ADULT - FALL HARM RISK - HARM RISK INTERVENTIONS

## 2023-09-30 NOTE — ED CDU PROVIDER INITIAL DAY NOTE - OBJECTIVE STATEMENT
68 year old female with PMHx HTN, HLD, IDDM, HFpEF (on 4L via NC baseline), CKDIII, DVT, uterine CA BIBA from home for b/l LE weakness. Patient with recent admission and d/c today from [facility] (9/17-9/29/23) for rectal pain, treated for UTI with Ceftriaxone x3 days (9/23-9/25), currently being treated for suspected PE (diagnosed on recent admission to [facility] 8/30-9/13/23). States she was evaluated by PT at [facility], recommended for ROSE placement post-dc however due to past experience she declined ROSE, wanted to go home, and states that since being home today has felt too weak to ambulate with her walker which is her baseline, so called 911 and was brought here. Denies any new/worsening symptoms. Denies any chest pain, shortness of breath, abdominal pain, nausea, vomiting.

## 2023-09-30 NOTE — H&P ADULT - HISTORY OF PRESENT ILLNESS
67yo F w/ PMH of HTN, HLD, DM2, HFpEF, CKD III, DVT, Uterine CA and recent hospitalization at [facility] from 08/30-09/08 for dyspnea thought likely 2/2 PE and severe pulm HTN, now on Eliquis was discharged from [facility] on 9/29 where she was admitted for severe constipation and rectal pain. Finaly having BMs. It was recommended patient be discharged to ROSE but she had a terrible experiance and did not want to go back so she was discharged home, She returned top [facility] ED after being discharged because her leg gave out out home and she almost fell. She is now agreeable to ROSE placement. She has no complaints beside her chronic back pain and chronic shortness of breath. She wears 4L NC at home.     Patient is being admitted for ROSE placement. Patient notes she is pretty much wheelchair bound at this point.

## 2023-09-30 NOTE — PROVIDER CONTACT NOTE (OTHER) - SITUATION
chart reviewed, contents noted, PT orders rreceived. Pt received/returned to supine in bed in NAD, 10/10 generalized pain.

## 2023-09-30 NOTE — H&P ADULT - ASSESSMENT
67yo F w/ PMH of HTN, HLD, DM2, HFpEF, CKD III, DVT, Uterine CA and recent hospitalization at [facility] from 08/30-09/08 for dyspnea thought likely 2/2 PE and severe pulm HTN, now on Eliquis was discharged from [facility] on 9/29 where she was admitted for severe constipation and rectal pain. Presented to ED same day of discharge for weakness needing NIC placement.     Weakness and debility from recent hospitalization and multiple medical co-morbidities, including DM, HFpEF and obesity  - pt eval need nic    DM with periods of hypoglycemia  not eating well  was discharged on premeal Novolog 20 units before breakfast and dinner, 16 units before lunch, Toujeo 30units qam and 38 units qpm    - will reduce lantus to 25 units qam and 30 unita qpm  - premeal 15 units    DVT and suspected PE  - continue eliquis    Chronic back pain on chronic opiods  ISTOP checked  - continue Oxycodone er 60 mg BID  - continue prn oxy 5mg as needed    Opioid induced constipation  - senna and miralax  - MOM prn    Chronic hypoxic resp failure from HFpEF  - continue oxygen 4L  - Lasix 40mg    Obesity   - bariatric bed  - low fat diet    dvt ppx: Eliquis  Dispo: admit to medicine  67yo F w/ PMH of HTN, HLD, DM2, HFpEF, CKD III, DVT, Uterine CA and recent hospitalization at [facility] from 08/30-09/08 for dyspnea thought likely 2/2 PE and severe pulm HTN, now on Eliquis was discharged from [facility] on 9/29 where she was admitted for severe constipation and rectal pain. Presented to ED same day of discharge for weakness needing NIC placement.     Weakness and debility from recent hospitalization and multiple medical co-morbidities, including DM, HFpEF and obesity  - pt eval need nic    DM with periods of hypoglycemia  not eating well  was discharged on premeal Novolog 20 units before breakfast and dinner, 16 units before lunch, Toujeo 30units qam and 38 units qpm    - will reduce lantus to 25 units qam and 30 unita qpm  - premeal 15 units    DVT and suspected PE  - continue eliquis    Chronic back pain on chronic opiods  ISTOP checked  - continue Oxycodone er 60 mg BID  - continue prn oxy 5mg as needed    Opioid induced constipation  - senna and miralax  - MOM prn    abnormal ua  - no sxs  - follow ucx  - recently treated for uti at [facility]    Chronic hypoxic resp failure from HFpEF  - continue oxygen 4L  - Lasix 40mg    Obesity   - bariatric bed  - low fat diet    dvt ppx: Eliquis  Dispo: admit to medicine

## 2023-09-30 NOTE — ED ADULT NURSE NOTE - CHIEF COMPLAINT QUOTE
patient d/c home from [facility] s/p admission for fall earlier today. states she got home and felt bilateral lower extremity weakness so she called 911. denies falling or injury. bariatric pt. hx of CHF, on 4LPM O@ NC baseline.

## 2023-09-30 NOTE — ED CDU PROVIDER DISPOSITION NOTE - NS ED MD DISPO ADMITTING SERVICE
MED
Vaccinations/Shaken Baby Prevention Handout/Back To Sleep Handout/Birth Certificate Instructions/Breastfeeding Mother’s Support Group Information/Guide to Postpartum Care

## 2023-09-30 NOTE — H&P ADULT - NSHPPHYSICALEXAM_GEN_ALL_CORE
PHYSICAL EXAM:  Constitutional: No acute distress, alert and oriented by 3, obese  HEENT: AT/NC, EOMI, PERRLA, Normal conjunctiva, no pharyngeal erythema, moist oral mucosa  Respiratory: CTA BL, equal breath sounds, no crackles or wheezing, 4L NC  Cardiovascular: RRR, + edema  Gastrointestinal: soft, Non-tender, Non-distended + Bowel sounds, no rebound or guarding  Genitourinary: no de la torre  Musculoskeletal: No joint edema  Neurological: CN 2-12 grossly intact, no focal deficits  Skin:  BL LE skin color changes hyperpigmentation from chronic DM  Psychiatric: normal mood and affect

## 2023-09-30 NOTE — ED ADULT NURSE NOTE - OBJECTIVE STATEMENT
Pt A&Ox4. PT stated that she was d/c from [facility] earlier today (9/29/23) after a month long admission. PT stated that she needs to ambulate with a walker.  PT stated that she was attempting to go to the bathroom and felt weak.  Pt hx of spinal stenosis and DM. Pt stated that she is unable to go home and ambulate safely.

## 2023-09-30 NOTE — PHYSICAL THERAPY INITIAL EVALUATION ADULT - GENERAL OBSERVATIONS, REHAB EVAL
Pt received in bed, +  NC O2 + Purewick, breathing on RA in NAD, in 10/10 back pain, agreeable to PT eval

## 2023-09-30 NOTE — ED ADULT NURSE NOTE - NSFALLUNIVINTERV_ED_ALL_ED
Bed/Stretcher in lowest position, wheels locked, appropriate side rails in place/Call bell, personal items and telephone in reach/Instruct patient to call for assistance before getting out of bed/chair/stretcher/Non-slip footwear applied when patient is off stretcher/[place] to call system/Physically safe environment - no spills, clutter or unnecessary equipment/Purposeful proactive rounding/Room/bathroom lighting operational, light cord in reach

## 2023-09-30 NOTE — PHYSICAL THERAPY INITIAL EVALUATION ADULT - PERTINENT HX OF CURRENT PROBLEM, REHAB EVAL
68 year old female with PMHx HTN, HLD, IDDM, HFpEF (on 4L via NC baseline), CKDIII, DVT, uterine CA BIBA from home for b/l LE weakness. Patient with recent admission and d/c today from [facility] (9/17-9/29/23) for rectal pain, treated for UTI with Ceftriaxone x3 days (9/23-9/25), currently being treated for suspected PE (diagnosed on recent admission to [facility] 8/30-9/13/23). States she was evaluated by PT at [facility], recommended for ROSE placement post-dc however due to past experience she declined ROSE, wanted to go home, and states that since being home today has felt too weak to ambulate with her walker which is her baseline, so called 911 and was brought here. Denies any new/worsening symptoms. Denies any chest pain, shortness of breath, abdominal pain, nausea, vomiting.

## 2023-10-01 LAB
ALBUMIN SERPL ELPH-MCNC: 3.1 G/DL — LOW (ref 3.3–5.2)
ALP SERPL-CCNC: 144 U/L — HIGH (ref 40–120)
ALT FLD-CCNC: 30 U/L — SIGNIFICANT CHANGE UP
ANION GAP SERPL CALC-SCNC: 13 MMOL/L — SIGNIFICANT CHANGE UP (ref 5–17)
AST SERPL-CCNC: 35 U/L — HIGH
BASOPHILS # BLD AUTO: 0.09 K/UL — SIGNIFICANT CHANGE UP (ref 0–0.2)
BASOPHILS NFR BLD AUTO: 1 % — SIGNIFICANT CHANGE UP (ref 0–2)
BILIRUB SERPL-MCNC: 0.6 MG/DL — SIGNIFICANT CHANGE UP (ref 0.4–2)
BUN SERPL-MCNC: 47.6 MG/DL — HIGH (ref 8–20)
CALCIUM SERPL-MCNC: 9.4 MG/DL — SIGNIFICANT CHANGE UP (ref 8.4–10.5)
CHLORIDE SERPL-SCNC: 98 MMOL/L — SIGNIFICANT CHANGE UP (ref 96–108)
CO2 SERPL-SCNC: 27 MMOL/L — SIGNIFICANT CHANGE UP (ref 22–29)
CREAT SERPL-MCNC: 1.65 MG/DL — HIGH (ref 0.5–1.3)
EGFR: 33 ML/MIN/1.73M2 — LOW
EOSINOPHIL # BLD AUTO: 0.36 K/UL — SIGNIFICANT CHANGE UP (ref 0–0.5)
EOSINOPHIL NFR BLD AUTO: 4 % — SIGNIFICANT CHANGE UP (ref 0–6)
GLUCOSE BLDC GLUCOMTR-MCNC: 140 MG/DL — HIGH (ref 70–99)
GLUCOSE BLDC GLUCOMTR-MCNC: 153 MG/DL — HIGH (ref 70–99)
GLUCOSE BLDC GLUCOMTR-MCNC: 211 MG/DL — HIGH (ref 70–99)
GLUCOSE BLDC GLUCOMTR-MCNC: 218 MG/DL — HIGH (ref 70–99)
GLUCOSE SERPL-MCNC: 147 MG/DL — HIGH (ref 70–99)
HCT VFR BLD CALC: 38.6 % — SIGNIFICANT CHANGE UP (ref 34.5–45)
HGB BLD-MCNC: 12.1 G/DL — SIGNIFICANT CHANGE UP (ref 11.5–15.5)
IMM GRANULOCYTES NFR BLD AUTO: 1 % — HIGH (ref 0–0.9)
LYMPHOCYTES # BLD AUTO: 1.83 K/UL — SIGNIFICANT CHANGE UP (ref 1–3.3)
LYMPHOCYTES # BLD AUTO: 20.4 % — SIGNIFICANT CHANGE UP (ref 13–44)
MCHC RBC-ENTMCNC: 28.9 PG — SIGNIFICANT CHANGE UP (ref 27–34)
MCHC RBC-ENTMCNC: 31.3 GM/DL — LOW (ref 32–36)
MCV RBC AUTO: 92.1 FL — SIGNIFICANT CHANGE UP (ref 80–100)
MONOCYTES # BLD AUTO: 0.77 K/UL — SIGNIFICANT CHANGE UP (ref 0–0.9)
MONOCYTES NFR BLD AUTO: 8.6 % — SIGNIFICANT CHANGE UP (ref 2–14)
NEUTROPHILS # BLD AUTO: 5.83 K/UL — SIGNIFICANT CHANGE UP (ref 1.8–7.4)
NEUTROPHILS NFR BLD AUTO: 65 % — SIGNIFICANT CHANGE UP (ref 43–77)
PLATELET # BLD AUTO: 234 K/UL — SIGNIFICANT CHANGE UP (ref 150–400)
POTASSIUM SERPL-MCNC: 4.4 MMOL/L — SIGNIFICANT CHANGE UP (ref 3.5–5.3)
POTASSIUM SERPL-SCNC: 4.4 MMOL/L — SIGNIFICANT CHANGE UP (ref 3.5–5.3)
PROT SERPL-MCNC: 5.8 G/DL — LOW (ref 6.6–8.7)
RBC # BLD: 4.19 M/UL — SIGNIFICANT CHANGE UP (ref 3.8–5.2)
RBC # FLD: 15.7 % — HIGH (ref 10.3–14.5)
SODIUM SERPL-SCNC: 138 MMOL/L — SIGNIFICANT CHANGE UP (ref 135–145)
WBC # BLD: 8.97 K/UL — SIGNIFICANT CHANGE UP (ref 3.8–10.5)
WBC # FLD AUTO: 8.97 K/UL — SIGNIFICANT CHANGE UP (ref 3.8–10.5)

## 2023-10-01 PROCEDURE — 99233 SBSQ HOSP IP/OBS HIGH 50: CPT | Mod: GC

## 2023-10-01 RX ORDER — INSULIN GLARGINE 100 [IU]/ML
30 INJECTION, SOLUTION SUBCUTANEOUS EVERY MORNING
Refills: 0 | Status: DISCONTINUED | OUTPATIENT
Start: 2023-10-02 | End: 2023-10-04

## 2023-10-01 RX ADMIN — APIXABAN 5 MILLIGRAM(S): 2.5 TABLET, FILM COATED ORAL at 05:21

## 2023-10-01 RX ADMIN — OXYCODONE HYDROCHLORIDE 60 MILLIGRAM(S): 5 TABLET ORAL at 05:21

## 2023-10-01 RX ADMIN — ONDANSETRON 4 MILLIGRAM(S): 8 TABLET, FILM COATED ORAL at 17:59

## 2023-10-01 RX ADMIN — Medication 15 UNIT(S): at 13:30

## 2023-10-01 RX ADMIN — OXYCODONE HYDROCHLORIDE 5 MILLIGRAM(S): 5 TABLET ORAL at 22:06

## 2023-10-01 RX ADMIN — POLYETHYLENE GLYCOL 3350 17 GRAM(S): 17 POWDER, FOR SOLUTION ORAL at 13:52

## 2023-10-01 RX ADMIN — INSULIN GLARGINE 30 UNIT(S): 100 INJECTION, SOLUTION SUBCUTANEOUS at 22:05

## 2023-10-01 RX ADMIN — OXYCODONE HYDROCHLORIDE 60 MILLIGRAM(S): 5 TABLET ORAL at 06:21

## 2023-10-01 RX ADMIN — Medication 40 MILLIGRAM(S): at 05:22

## 2023-10-01 RX ADMIN — OXYCODONE HYDROCHLORIDE 5 MILLIGRAM(S): 5 TABLET ORAL at 23:30

## 2023-10-01 RX ADMIN — Medication 4: at 13:44

## 2023-10-01 RX ADMIN — Medication 15 UNIT(S): at 17:45

## 2023-10-01 RX ADMIN — SENNA PLUS 2 TABLET(S): 8.6 TABLET ORAL at 22:06

## 2023-10-01 RX ADMIN — APIXABAN 5 MILLIGRAM(S): 2.5 TABLET, FILM COATED ORAL at 17:45

## 2023-10-01 RX ADMIN — INSULIN GLARGINE 25 UNIT(S): 100 INJECTION, SOLUTION SUBCUTANEOUS at 08:30

## 2023-10-01 RX ADMIN — Medication 15 UNIT(S): at 08:30

## 2023-10-01 RX ADMIN — OXYCODONE HYDROCHLORIDE 5 MILLIGRAM(S): 5 TABLET ORAL at 10:00

## 2023-10-01 RX ADMIN — OXYCODONE HYDROCHLORIDE 60 MILLIGRAM(S): 5 TABLET ORAL at 17:45

## 2023-10-01 RX ADMIN — ATORVASTATIN CALCIUM 80 MILLIGRAM(S): 80 TABLET, FILM COATED ORAL at 22:06

## 2023-10-01 RX ADMIN — OXYCODONE HYDROCHLORIDE 5 MILLIGRAM(S): 5 TABLET ORAL at 11:00

## 2023-10-01 RX ADMIN — Medication 4: at 17:49

## 2023-10-01 NOTE — PROGRESS NOTE ADULT - SUBJECTIVE AND OBJECTIVE BOX
HEALTH ISSUES - PROBLEM Dx:  67yo F w/ Obesity, HTN, HLD, DM2, HFpEF, CKD III, DVT, Uterine CA, suspected PE, sev pUlm HTN, was discharged from [facility] with recs of ROSE. Pt declined, went home, fell and returned to our ED.  now awaits ROSE    INTERVAL HPI/ OVERNIGHT EVENTS:    morbid obesity  is anxious and crying about going to [facility]  emotional support given  encouraged to stay motivated and do well at [facility] to shorten her stay there    REVIEW OF SYSTEMS:    as baldev    Vital Signs Last 24 Hrs  T(C): 36.7 (01 Oct 2023 17:21), Max: 36.7 (01 Oct 2023 17:21)  T(F): 98 (01 Oct 2023 17:21), Max: 98 (01 Oct 2023 17:21)  HR: 70 (01 Oct 2023 17:21) (70 - 73)  BP: 102/52 (01 Oct 2023 17:21) (102/52 - 137/62)  BP(mean): --  RR: 18 (01 Oct 2023 17:21) (18 - 19)  SpO2: 95% (01 Oct 2023 17:21) (95% - 97%)    Parameters below as of 01 Oct 2023 17:21  Patient On (Oxygen Delivery Method): nasal cannula  O2 Flow (L/min): 4      PHYSICAL EXAM-  GENERAL: Morbid obesity, lying in bed, crying, anxious  HEAD:  Atraumatic, Normocephalic  EYES: EOMI, PERRLA, conjunctiva and sclera clear  ENMT:  Moist mucous membranes,  No lesions  NECK: Supple, No JVD, Normal thyroid  NERVOUS SYSTEM:  Alert & Oriented X 3, Moving all etremities  CHEST/LUNG: CTA bilaterally; No rales, rhonchi, wheezing, or rubs  HEART: Regular rate and rhythm; No murmurs, rubs, or gallops  ABDOMEN: Soft, Nontender, Nondistended; Bowel sounds present  EXTREMITIES:  2+ Peripheral Pulses, No clubbing, cyanosis, or edema      MEDICATIONS  (STANDING):  apixaban 5 milliGRAM(s) Oral every 12 hours  atorvastatin 80 milliGRAM(s) Oral at bedtime  dextrose 5%. 1000 milliLiter(s) (50 mL/Hr) IV Continuous <Continuous>  dextrose 5%. 1000 milliLiter(s) (100 mL/Hr) IV Continuous <Continuous>  dextrose 50% Injectable 25 Gram(s) IV Push once  dextrose 50% Injectable 25 Gram(s) IV Push once  dextrose 50% Injectable 12.5 Gram(s) IV Push once  dextrose 50% Injectable 25 Gram(s) IV Push once  furosemide    Tablet 40 milliGRAM(s) Oral daily  glucagon  Injectable 1 milliGRAM(s) IntraMuscular once  glucagon  Injectable 1 milliGRAM(s) IntraMuscular once  influenza  Vaccine (HIGH DOSE) 0.7 milliLiter(s) IntraMuscular once  insulin glargine Injectable (LANTUS) 25 Unit(s) SubCutaneous every morning  insulin glargine Injectable (LANTUS) 30 Unit(s) SubCutaneous at bedtime  insulin lispro (ADMELOG) corrective regimen sliding scale   SubCutaneous three times a day before meals  insulin lispro (ADMELOG) corrective regimen sliding scale   SubCutaneous at bedtime  insulin lispro Injectable (ADMELOG) 15 Unit(s) SubCutaneous three times a day before meals  oxyCODONE  ER Tablet 60 milliGRAM(s) Oral every 12 hours  polyethylene glycol 3350 17 Gram(s) Oral daily  senna 2 Tablet(s) Oral at bedtime    MEDICATIONS  (PRN):  acetaminophen     Tablet .. 650 milliGRAM(s) Oral every 4 hours PRN Mild Pain (1 - 3)  aluminum hydroxide/magnesium hydroxide/simethicone Suspension 30 milliLiter(s) Oral every 4 hours PRN Dyspepsia  dextrose Oral Gel 15 Gram(s) Oral once PRN Blood Glucose LESS THAN 70 milliGRAM(s)/deciliter  dextrose Oral Gel 15 Gram(s) Oral once PRN Blood Glucose LESS THAN 70 milliGRAM(s)/deciliter  magnesium hydroxide Suspension 30 milliLiter(s) Oral daily PRN Constipation  melatonin 3 milliGRAM(s) Oral at bedtime PRN Insomnia  ondansetron Injectable 4 milliGRAM(s) IV Push every 8 hours PRN Nausea and/or Vomiting  oxyCODONE    IR 5 milliGRAM(s) Oral every 6 hours PRN Moderate Pain (4 - 6)      LABS:                        12.1   8.97  )-----------( 234      ( 01 Oct 2023 04:42 )             38.6     10-01    138  |  98  |  47.6<H>  ----------------------------<  147<H>  4.4   |  27.0  |  1.65<H>    Ca    9.4      01 Oct 2023 04:42    TPro  5.8<L>  /  Alb  3.1<L>  /  TBili  0.6  /  DBili  x   /  AST  35<H>  /  ALT  30  /  AlkPhos  144<H>  10-01      Urinalysis Basic - ( 01 Oct 2023 04:42 )    Color: x / Appearance: x / SG: x / pH: x  Gluc: 147 mg/dL / Ketone: x  / Bili: x / Urobili: x   Blood: x / Protein: x / Nitrite: x   Leuk Esterase: x / RBC: x / WBC x   Sq Epi: x / Non Sq Epi: x / Bacteria: x

## 2023-10-01 NOTE — PROGRESS NOTE ADULT - ASSESSMENT
67yo F w/ Obesity, HTN, HLD, DM2, HFpEF, CKD III, DVT, Uterine CA, suspected PE, sev pUlm HTN, was discharged from [facility] with recs of ROSE. Pt declined, went home, fell and returned to our ED.  now awaits ROSE    # Garth LE weakness  # fall prior toa rrival sec to weakness  per PT eval needs ROSE  UA neg  Clinically FROM    # DM2 uncontrolled  Increase insulin regimen  AM insulin increased by 5 units  cont the rest as is  monitor and reassess    # HTN, HLD, HFpEF  Cont all home meds  statins and lasix   recommend o/p cards f/u for optimixing GDMT    # CKD stg 3  avoid nephrotoxics as possible    # VTE, Sev Pulm HTN  On full A/C    needs ROSE  medically stable

## 2023-10-02 ENCOUNTER — TRANSCRIPTION ENCOUNTER (OUTPATIENT)
Age: 68
End: 2023-10-02

## 2023-10-02 LAB
GLUCOSE BLDC GLUCOMTR-MCNC: 126 MG/DL — HIGH (ref 70–99)
GLUCOSE BLDC GLUCOMTR-MCNC: 157 MG/DL — HIGH (ref 70–99)
GLUCOSE BLDC GLUCOMTR-MCNC: 172 MG/DL — HIGH (ref 70–99)
GLUCOSE BLDC GLUCOMTR-MCNC: 225 MG/DL — HIGH (ref 70–99)
MAGNESIUM SERPL-MCNC: 2.1 MG/DL — SIGNIFICANT CHANGE UP (ref 1.6–2.6)
PHOSPHATE SERPL-MCNC: 4.2 MG/DL — SIGNIFICANT CHANGE UP (ref 2.4–4.7)

## 2023-10-02 PROCEDURE — 99232 SBSQ HOSP IP/OBS MODERATE 35: CPT | Mod: GC

## 2023-10-02 RX ORDER — LACTULOSE 10 G/15ML
10 SOLUTION ORAL DAILY
Refills: 0 | Status: DISCONTINUED | OUTPATIENT
Start: 2023-10-02 | End: 2023-10-04

## 2023-10-02 RX ADMIN — INSULIN GLARGINE 30 UNIT(S): 100 INJECTION, SOLUTION SUBCUTANEOUS at 23:27

## 2023-10-02 RX ADMIN — OXYCODONE HYDROCHLORIDE 60 MILLIGRAM(S): 5 TABLET ORAL at 05:03

## 2023-10-02 RX ADMIN — ATORVASTATIN CALCIUM 80 MILLIGRAM(S): 80 TABLET, FILM COATED ORAL at 23:27

## 2023-10-02 RX ADMIN — SENNA PLUS 2 TABLET(S): 8.6 TABLET ORAL at 23:26

## 2023-10-02 RX ADMIN — POLYETHYLENE GLYCOL 3350 17 GRAM(S): 17 POWDER, FOR SOLUTION ORAL at 10:09

## 2023-10-02 RX ADMIN — INSULIN GLARGINE 30 UNIT(S): 100 INJECTION, SOLUTION SUBCUTANEOUS at 08:37

## 2023-10-02 RX ADMIN — Medication 15 UNIT(S): at 17:40

## 2023-10-02 RX ADMIN — OXYCODONE HYDROCHLORIDE 5 MILLIGRAM(S): 5 TABLET ORAL at 10:08

## 2023-10-02 RX ADMIN — Medication 40 MILLIGRAM(S): at 05:04

## 2023-10-02 RX ADMIN — Medication 15 UNIT(S): at 13:44

## 2023-10-02 RX ADMIN — Medication 15 UNIT(S): at 08:37

## 2023-10-02 RX ADMIN — OXYCODONE HYDROCHLORIDE 60 MILLIGRAM(S): 5 TABLET ORAL at 17:39

## 2023-10-02 RX ADMIN — OXYCODONE HYDROCHLORIDE 5 MILLIGRAM(S): 5 TABLET ORAL at 11:08

## 2023-10-02 RX ADMIN — APIXABAN 5 MILLIGRAM(S): 2.5 TABLET, FILM COATED ORAL at 05:04

## 2023-10-02 RX ADMIN — Medication 2: at 17:39

## 2023-10-02 RX ADMIN — OXYCODONE HYDROCHLORIDE 5 MILLIGRAM(S): 5 TABLET ORAL at 21:55

## 2023-10-02 RX ADMIN — APIXABAN 5 MILLIGRAM(S): 2.5 TABLET, FILM COATED ORAL at 17:39

## 2023-10-02 RX ADMIN — OXYCODONE HYDROCHLORIDE 5 MILLIGRAM(S): 5 TABLET ORAL at 20:55

## 2023-10-02 RX ADMIN — Medication 4: at 13:43

## 2023-10-02 RX ADMIN — Medication 2: at 08:36

## 2023-10-02 NOTE — DIETITIAN INITIAL EVALUATION ADULT - ORAL INTAKE PTA/DIET HISTORY
Pt reports fair po intake with intentional weight loss of 75# recently. Pt with stage 2 coccyx noted. Encouraged MVI, Vit C and HBV protein for increased wound healing. Reviewed diet education with pt. Pt claims to have been making an effort lately and is trying to eat healthier.

## 2023-10-02 NOTE — PROGRESS NOTE ADULT - SUBJECTIVE AND OBJECTIVE BOX
69yo F w/ Obesity, HTN, HLD, DM2, HFpEF, CKD III, DVT, Uterine CA, suspected PE, sev pUlm HTN, was discharged from [facility] with recs of ROSE. Pt declined, went home, fell and returned to our ED.  now awaits ROSE    ROS:  CONSTITUTIONAL: Denies fever, chills, fatigue  HEENT: Denies acute changes in vision and hearing  CARDIO: Denies CP, SOB, palpitations  PULM: Denies cough, wheezing, SOB  ABD: Denies abd pain, n/v/d/c  : Denies dysuria, urinary frequency  NEURO: Denies HA, numbness/tingling  EXTREMITIES: Sacral irritation, anal fissure discomfort      MEDICATIONS  (STANDING):  apixaban 5 milliGRAM(s) Oral every 12 hours  atorvastatin 80 milliGRAM(s) Oral at bedtime  dextrose 5%. 1000 milliLiter(s) (50 mL/Hr) IV Continuous <Continuous>  dextrose 5%. 1000 milliLiter(s) (100 mL/Hr) IV Continuous <Continuous>  dextrose 50% Injectable 25 Gram(s) IV Push once  dextrose 50% Injectable 25 Gram(s) IV Push once  dextrose 50% Injectable 12.5 Gram(s) IV Push once  dextrose 50% Injectable 25 Gram(s) IV Push once  furosemide    Tablet 40 milliGRAM(s) Oral daily  glucagon  Injectable 1 milliGRAM(s) IntraMuscular once  glucagon  Injectable 1 milliGRAM(s) IntraMuscular once  influenza  Vaccine (HIGH DOSE) 0.7 milliLiter(s) IntraMuscular once  insulin glargine Injectable (LANTUS) 30 Unit(s) SubCutaneous every morning  insulin glargine Injectable (LANTUS) 30 Unit(s) SubCutaneous at bedtime  insulin lispro (ADMELOG) corrective regimen sliding scale   SubCutaneous three times a day before meals  insulin lispro (ADMELOG) corrective regimen sliding scale   SubCutaneous at bedtime  insulin lispro Injectable (ADMELOG) 15 Unit(s) SubCutaneous three times a day before meals  oxyCODONE  ER Tablet 60 milliGRAM(s) Oral every 12 hours  polyethylene glycol 3350 17 Gram(s) Oral daily  senna 2 Tablet(s) Oral at bedtime    MEDICATIONS  (PRN):  acetaminophen     Tablet .. 650 milliGRAM(s) Oral every 4 hours PRN Mild Pain (1 - 3)  aluminum hydroxide/magnesium hydroxide/simethicone Suspension 30 milliLiter(s) Oral every 4 hours PRN Dyspepsia  dextrose Oral Gel 15 Gram(s) Oral once PRN Blood Glucose LESS THAN 70 milliGRAM(s)/deciliter  dextrose Oral Gel 15 Gram(s) Oral once PRN Blood Glucose LESS THAN 70 milliGRAM(s)/deciliter  magnesium hydroxide Suspension 30 milliLiter(s) Oral daily PRN Constipation  melatonin 3 milliGRAM(s) Oral at bedtime PRN Insomnia  ondansetron Injectable 4 milliGRAM(s) IV Push every 8 hours PRN Nausea and/or Vomiting  oxyCODONE    IR 5 milliGRAM(s) Oral every 6 hours PRN Moderate Pain (4 - 6)      Allergies    latex (Rash)  wool- rash, itch (Other)  adhesives (Rash)  Bactrim (Flushing)    Intolerances          Vital Signs Last 24 Hrs  T(C): 36.5 (02 Oct 2023 10:29), Max: 36.9 (01 Oct 2023 22:07)  T(F): 97.7 (02 Oct 2023 10:29), Max: 98.4 (01 Oct 2023 22:07)  HR: 74 (02 Oct 2023 10:29) (69 - 74)  BP: 103/48 (02 Oct 2023 10:29) (102/52 - 129/56)  BP(mean): --  RR: 18 (02 Oct 2023 10:29) (18 - 20)  SpO2: 93% (02 Oct 2023 10:29) (92% - 95%)    Parameters below as of 02 Oct 2023 10:29  Patient On (Oxygen Delivery Method): nasal cannula  O2 Flow (L/min): 4      PHYSICAL EXAM:  GENERAL: Obese female, A&Ox3, no acute distress, comfortably in bed  HEENT:  Atraumatic, normocephalic, non-icteric sclera, no cervical LAD, neck supple, no JVD, thyroid normal  NEURO/PSYCH:  No focal deficits, normal affect, strength 5/5 all 4 extremities  LUNGS: CTAB, no wrr, non-labored breathing  HEART: RRR, no murmur appreciated  ABD: Soft, non-tender, non-distended, no organomegaly, no appreciable masses, +bs all 4 quadrants  EXTREMITIES:  LE bilateral swelling/erythema  SKIN: LE bilateral swelling/erythema      LABS:                        12.1   8.97  )-----------( 234      ( 01 Oct 2023 04:42 )             38.6     10-01    138  |  98  |  47.6<H>  ----------------------------<  147<H>  4.4   |  27.0  |  1.65<H>    Ca    9.4      01 Oct 2023 04:42  Phos  4.2     10-02  Mg     2.1     10-02    TPro  5.8<L>  /  Alb  3.1<L>  /  TBili  0.6  /  DBili  x   /  AST  35<H>  /  ALT  30  /  AlkPhos  144<H>  10-01      Urinalysis Basic - ( 01 Oct 2023 04:42 )    Color: x / Appearance: x / SG: x / pH: x  Gluc: 147 mg/dL / Ketone: x  / Bili: x / Urobili: x   Blood: x / Protein: x / Nitrite: x   Leuk Esterase: x / RBC: x / WBC x   Sq Epi: x / Non Sq Epi: x / Bacteria: x      CAPILLARY BLOOD GLUCOSE      POCT Blood Glucose.: 225 mg/dL (02 Oct 2023 12:57)  POCT Blood Glucose.: 157 mg/dL (02 Oct 2023 08:36)  POCT Blood Glucose.: 153 mg/dL (01 Oct 2023 22:04)  POCT Blood Glucose.: 218 mg/dL (01 Oct 2023 17:47)      RADIOLOGY & ADDITIONAL TESTS:      Imaging Personally Reviewed:  [  ] YES  [  ] NO    Consultant(s) Notes Reviewed:  [  ] YES  [  ] NO    Care Discussed with Consultants/Other Providers [  ] YES  [  ] NO    Plan of Care discussed with Housestaff [ X ]YES [  ] NO 67yo F w/ Obesity, HTN, HLD, DM2, HFpEF, CKD III, DVT, Uterine CA, suspected PE, sev pUlm HTN, was discharged from [facility] with recs of ROSE. Pt declined, went home, fell and returned to our ED.  now awaits ORSE    ROS:  CONSTITUTIONAL: Denies fever, chills, fatigue  HEENT: Denies acute changes in vision and hearing  CARDIO: Denies CP, SOB, palpitations  PULM: Denies cough, wheezing, SOB  ABD: Denies abd pain, n/v/d/c  : Denies dysuria, urinary frequency  NEURO: Denies HA, numbness/tingling  EXTREMITIES: Sacral irritation, anal fissure discomfort      MEDICATIONS  (STANDING):  apixaban 5 milliGRAM(s) Oral every 12 hours  atorvastatin 80 milliGRAM(s) Oral at bedtime  dextrose 5%. 1000 milliLiter(s) (50 mL/Hr) IV Continuous <Continuous>  dextrose 5%. 1000 milliLiter(s) (100 mL/Hr) IV Continuous <Continuous>  dextrose 50% Injectable 25 Gram(s) IV Push once  dextrose 50% Injectable 25 Gram(s) IV Push once  dextrose 50% Injectable 12.5 Gram(s) IV Push once  dextrose 50% Injectable 25 Gram(s) IV Push once  furosemide    Tablet 40 milliGRAM(s) Oral daily  glucagon  Injectable 1 milliGRAM(s) IntraMuscular once  glucagon  Injectable 1 milliGRAM(s) IntraMuscular once  influenza  Vaccine (HIGH DOSE) 0.7 milliLiter(s) IntraMuscular once  insulin glargine Injectable (LANTUS) 30 Unit(s) SubCutaneous every morning  insulin glargine Injectable (LANTUS) 30 Unit(s) SubCutaneous at bedtime  insulin lispro (ADMELOG) corrective regimen sliding scale   SubCutaneous three times a day before meals  insulin lispro (ADMELOG) corrective regimen sliding scale   SubCutaneous at bedtime  insulin lispro Injectable (ADMELOG) 15 Unit(s) SubCutaneous three times a day before meals  oxyCODONE  ER Tablet 60 milliGRAM(s) Oral every 12 hours  polyethylene glycol 3350 17 Gram(s) Oral daily  senna 2 Tablet(s) Oral at bedtime    MEDICATIONS  (PRN):  acetaminophen     Tablet .. 650 milliGRAM(s) Oral every 4 hours PRN Mild Pain (1 - 3)  aluminum hydroxide/magnesium hydroxide/simethicone Suspension 30 milliLiter(s) Oral every 4 hours PRN Dyspepsia  dextrose Oral Gel 15 Gram(s) Oral once PRN Blood Glucose LESS THAN 70 milliGRAM(s)/deciliter  dextrose Oral Gel 15 Gram(s) Oral once PRN Blood Glucose LESS THAN 70 milliGRAM(s)/deciliter  magnesium hydroxide Suspension 30 milliLiter(s) Oral daily PRN Constipation  melatonin 3 milliGRAM(s) Oral at bedtime PRN Insomnia  ondansetron Injectable 4 milliGRAM(s) IV Push every 8 hours PRN Nausea and/or Vomiting  oxyCODONE    IR 5 milliGRAM(s) Oral every 6 hours PRN Moderate Pain (4 - 6)      Allergies    latex (Rash)  wool- rash, itch (Other)  adhesives (Rash)  Bactrim (Flushing)    Intolerances    Vital Signs Last 24 Hrs  T(C): 36.5 (02 Oct 2023 10:29), Max: 36.9 (01 Oct 2023 22:07)  T(F): 97.7 (02 Oct 2023 10:29), Max: 98.4 (01 Oct 2023 22:07)  HR: 74 (02 Oct 2023 10:29) (69 - 74)  BP: 103/48 (02 Oct 2023 10:29) (102/52 - 129/56)  BP(mean): --  RR: 18 (02 Oct 2023 10:29) (18 - 20)  SpO2: 93% (02 Oct 2023 10:29) (92% - 95%)    Parameters below as of 02 Oct 2023 10:29  Patient On (Oxygen Delivery Method): nasal cannula  O2 Flow (L/min): 4      PHYSICAL EXAM:  GENERAL: Obese female, A&Ox3, no acute distress, comfortably in bed  HEENT:  Atraumatic, normocephalic, non-icteric sclera, no cervical LAD, neck supple, no JVD, thyroid normal  NEURO/PSYCH:  No focal deficits, normal affect, strength 5/5 all 4 extremities  LUNGS: CTAB, no wrr, non-labored breathing  HEART: RRR, no murmur appreciated  ABD: Soft, non-tender, non-distended, no organomegaly, no appreciable masses, +bs all 4 quadrants  EXTREMITIES:  LE bilateral swelling/erythema chronic  SKIN: LE bilateral swelling/erythema      LABS:                        12.1   8.97  )-----------( 234      ( 01 Oct 2023 04:42 )             38.6     10-01    138  |  98  |  47.6<H>  ----------------------------<  147<H>  4.4   |  27.0  |  1.65<H>    Ca    9.4      01 Oct 2023 04:42  Phos  4.2     10-02  Mg     2.1     10-02    TPro  5.8<L>  /  Alb  3.1<L>  /  TBili  0.6  /  DBili  x   /  AST  35<H>  /  ALT  30  /  AlkPhos  144<H>  10-01      Urinalysis Basic - ( 01 Oct 2023 04:42 )    Color: x / Appearance: x / SG: x / pH: x  Gluc: 147 mg/dL / Ketone: x  / Bili: x / Urobili: x   Blood: x / Protein: x / Nitrite: x   Leuk Esterase: x / RBC: x / WBC x   Sq Epi: x / Non Sq Epi: x / Bacteria: x      CAPILLARY BLOOD GLUCOSE      POCT Blood Glucose.: 225 mg/dL (02 Oct 2023 12:57)  POCT Blood Glucose.: 157 mg/dL (02 Oct 2023 08:36)  POCT Blood Glucose.: 153 mg/dL (01 Oct 2023 22:04)  POCT Blood Glucose.: 218 mg/dL (01 Oct 2023 17:47)      RADIOLOGY & ADDITIONAL TESTS:      Imaging Personally Reviewed:  [  ] YES  [  ] NO    Consultant(s) Notes Reviewed:  [  ] YES  [  ] NO    Care Discussed with Consultants/Other Providers [  ] YES  [  ] NO    Plan of Care discussed with Housestaff [ X ]YES [  ] NO

## 2023-10-02 NOTE — DISCHARGE NOTE PROVIDER - NSDCCPCAREPLAN_GEN_ALL_CORE_FT
PRINCIPAL DISCHARGE DIAGNOSIS  Diagnosis: Generalized weakness  Assessment and Plan of Treatment: Discharge to [facility]     PRINCIPAL DISCHARGE DIAGNOSIS  Diagnosis: Generalized weakness  Assessment and Plan of Treatment: Discharge to [facility]      SECONDARY DISCHARGE DIAGNOSES  Diagnosis: Fall  Assessment and Plan of Treatment:     Diagnosis: Morbidly obese  Assessment and Plan of Treatment:     Diagnosis: Chronic heart failure with preserved ejection fraction (HFpEF)  Assessment and Plan of Treatment:     Diagnosis: HTN (hypertension)  Assessment and Plan of Treatment:     Diagnosis: Diabetes  Assessment and Plan of Treatment:

## 2023-10-02 NOTE — DIETITIAN INITIAL EVALUATION ADULT - MALNUTRITION
PT TO ED FOR "LOW GRADE FEVER AND LOW BP" AND GENERAL FATIGUE X 1-2 HOURS. PT 
STATES SHE DID NOT TAKE HER TEMP OR BP AT HOME. PT IS A POOR HISTORIAN AND IS 
UNABLE TO VERBALIZE MANY ANSWERES. CONNECTED TO MONITORS. VSS. EDMD ASSESSMENT 
COMPLETE. AWAITING ORDERS AT THIS TIME. moderate

## 2023-10-02 NOTE — DIETITIAN NUTRITION RISK NOTIFICATION - TREATMENT: THE FOLLOWING DIET HAS BEEN RECOMMENDED
Diet, DASH/TLC:   Sodium & Cholesterol Restricted  Consistent Carbohydrate {Evening Snack} (CSTCHOSN)  Low Fat (LOWFAT) (09-30-23 @ 14:42) [Active]

## 2023-10-02 NOTE — DISCHARGE NOTE PROVIDER - ATTENDING DISCHARGE PHYSICAL EXAMINATION:
ICU Vital Signs Last 24 Hrs  T(C): 36.5 (02 Oct 2023 10:29), Max: 36.9 (01 Oct 2023 22:07)  T(F): 97.7 (02 Oct 2023 10:29), Max: 98.4 (01 Oct 2023 22:07)  HR: 74 (02 Oct 2023 10:29) (69 - 74)  BP: 103/48 (02 Oct 2023 10:29) (102/52 - 129/56)  BP(mean): --  ABP: --  ABP(mean): --  RR: 18 (02 Oct 2023 10:29) (18 - 20)  SpO2: 93% (02 Oct 2023 10:29) (92% - 95%)    O2 Parameters below as of 02 Oct 2023 10:29  Patient On (Oxygen Delivery Method): nasal cannula  O2 Flow (L/min): 4      GENERAL: Morbid obesity, lying in bed, crying, anxious  HEAD:  Atraumatic, Normocephalic  EYES: EOMI, PERRLA, conjunctiva and sclera clear  ENMT:  Moist mucous membranes,  No lesions  NECK: Supple, No JVD, Normal thyroid  NERVOUS SYSTEM:  Alert & Oriented X 3, Moving all etremities  CHEST/LUNG: CTA bilaterally; No rales, rhonchi, wheezing, or rubs  HEART: Regular rate and rhythm; No murmurs, rubs, or gallops  ABDOMEN: Soft, Nontender, Nondistended; Bowel sounds present  EXTREMITIES:  2+ Peripheral Pulses, No clubbing, cyanosis, or edema ICU Vital Signs Last 24 Hrs  T(C): 36.6 (04 Oct 2023 04:33), Max: 36.9 (03 Oct 2023 11:56)  T(F): 97.9 (04 Oct 2023 04:33), Max: 98.4 (03 Oct 2023 11:56)  HR: 69 (04 Oct 2023 04:33) (65 - 73)  BP: 135/68 (04 Oct 2023 04:33) (111/60 - 135/68)  BP(mean): --  ABP: --  ABP(mean): --  RR: 18 (04 Oct 2023 04:33) (18 - 18)  SpO2: 96% (04 Oct 2023 04:33) (95% - 97%)    O2 Parameters below as of 04 Oct 2023 04:33  Patient On (Oxygen Delivery Method): nasal cannula  O2 Flow (L/min): 4    GENERAL: Morbid obesity, lying in bed, crying, anxious  HEAD:  Atraumatic, Normocephalic  EYES: EOMI, PERRLA, conjunctiva and sclera clear  ENMT:  Moist mucous membranes,  No lesions  NECK: Supple, No JVD, Normal thyroid  NERVOUS SYSTEM:  Alert & Oriented X 3, Moving all etremities  CHEST/LUNG: CTA bilaterally; No rales, rhonchi, wheezing, or rubs  HEART: Regular rate and rhythm; No murmurs, rubs, or gallops  ABDOMEN: Soft, Nontender, Nondistended; Bowel sounds present  EXTREMITIES:  2+ Peripheral Pulses, No clubbing, cyanosis, or edema

## 2023-10-02 NOTE — DISCHARGE NOTE PROVIDER - HOSPITAL COURSE
69yo F w/ PMH of HTN, HLD, DM2, HFpEF, CKD III, DVT, Uterine CA and recent hospitalization at [facility] from 08/30-09/08 for dyspnea thought likely 2/2 PE and severe pulm HTN, now on Eliquis was discharged from [facility] on 9/29 where she was admitted for severe constipation and rectal pain. Finaly having BMs. It was recommended patient be discharged to [facility] but she had a terrible experience and did not want to go back so she was discharged home, She returned top [facility] ED after being discharged because her leg gave out out home and she almost fell. She is now agreeable to [facility] placement. She has no complaints beside her chronic back pain and chronic shortness of breath. She wears 4L NC at home.   Patient notes she is pretty much wheelchair bound at this point. Her UA was negative, and she is clinically full range of motion. Concurrent medical conditions have been addressed: DM2 recommend increasing the insulin regimen, monitor. HTN, HLD and HFpEF: continue all home medications, including statins and lasix, recommend outpatient cardiology follow up for optimizing medical management. For her CKD stage 3, avoid nephrotoxics. Patient is medically stable for discharge to [facility].

## 2023-10-02 NOTE — DIETITIAN INITIAL EVALUATION ADULT - PERTINENT LABORATORY DATA
10-01    138  |  98  |  47.6<H>  ----------------------------<  147<H>  4.4   |  27.0  |  1.65<H>    Ca    9.4      01 Oct 2023 04:42  Phos  4.2     10-02  Mg     2.1     10-02    TPro  5.8<L>  /  Alb  3.1<L>  /  TBili  0.6  /  DBili  x   /  AST  35<H>  /  ALT  30  /  AlkPhos  144<H>  10-01  POCT Blood Glucose.: 157 mg/dL (10-02-23 @ 08:36)  A1C with Estimated Average Glucose Result: 7.2 % (09-17-23 @ 16:13)  A1C with Estimated Average Glucose Result: 6.5 % (08-31-23 @ 10:25)  A1C with Estimated Average Glucose Result: 7.3 % (10-13-22 @ 11:47)

## 2023-10-02 NOTE — DISCHARGE NOTE PROVIDER - NSDCMRMEDTOKEN_GEN_ALL_CORE_FT
ADVAIR -21 MCG INHALER: TAKE 2 PUFFS BY MOUTH TWICE A DAY  apixaban 5 mg oral tablet: 1 tab(s) orally every 12 hours  furosemide 40 mg oral tablet: 1 tab(s) orally once a day  glucometer (per patient&#x27;s insurance): Test blood sugars four times a day. Dispense #1 glucometer.  MiraLax oral powder for reconstitution: 17 gram(s) orally once a day  NovoLOG FlexPen 100 units/mL injectable solution: 56 unit(s) injectable once a day 20 units for breakfast, 16 units for lunch, and 20 units for Dinner.  oxyCODONE 60 mg oral tablet, extended release: 1 tab(s) orally every 12 hours  ProAir HFA 90 mcg/inh inhalation aerosol: 2 puff(s) inhaled  rosuvastatin 20 mg oral capsule: 1 cap(s) orally once a day (at bedtime)  test strips (per patient&#x27;s insurance): 1 application subcutaneously 4 times a day. ** Compatible with patient&#x27;s glucometer **  Toujeo SoloStar 300 units/mL subcutaneous solution: subcutaneous   ADVAIR -21 MCG INHALER: TAKE 2 PUFFS BY MOUTH TWICE A DAY  apixaban 5 mg oral tablet: 1 tab(s) orally every 12 hours  furosemide 40 mg oral tablet: 1 tab(s) orally once a day  glucometer (per patient&#x27;s insurance): Test blood sugars four times a day. Dispense #1 glucometer.  MiraLax oral powder for reconstitution: 17 gram(s) orally once a day  NovoLOG FlexPen 100 units/mL injectable solution: 56 unit(s) injectable once a day 20 units for breakfast, 16 units for lunch, and 20 units for Dinner.  oxyCODONE 5 mg oral tablet: 1 tab(s) orally every 6 hours As needed Moderate Pain (4 - 6)  oxyCODONE 60 mg oral tablet, extended release: 1 tab(s) orally every 12 hours  ProAir HFA 90 mcg/inh inhalation aerosol: 2 puff(s) inhaled  rosuvastatin 20 mg oral capsule: 1 cap(s) orally once a day (at bedtime)  senna leaf extract oral tablet: 2 tab(s) orally once a day (at bedtime)  test strips (per patient&#x27;s insurance): 1 application subcutaneously 4 times a day. ** Compatible with patient&#x27;s glucometer **  Toujeo SoloStar 300 units/mL subcutaneous solution: subcutaneous   ADVAIR -21 MCG INHALER: TAKE 2 PUFFS BY MOUTH TWICE A DAY  apixaban 5 mg oral tablet: 1 tab(s) orally every 12 hours  furosemide 40 mg oral tablet: 1 tab(s) orally once a day  glucometer (per patient&#x27;s insurance): Test blood sugars four times a day. Dispense #1 glucometer.  insulin glargine 100 units/mL subcutaneous solution: 28 international unit(s) subcutaneous once a day (at bedtime) and 30 UNITS SQ DAILY in AM  MiraLax oral powder for reconstitution: 17 gram(s) orally once a day  NovoLOG FlexPen 100 units/mL injectable solution: 20 unit(s) injectable once a day 20 units for breakfast, 16 units for lunch, and 20 units for Dinner.  oxyCODONE 5 mg oral tablet: 1 tab(s) orally every 6 hours As needed Moderate Pain (4 - 6)  oxyCODONE 60 mg oral tablet, extended release: 1 tab(s) orally every 12 hours  ProAir HFA 90 mcg/inh inhalation aerosol: 2 puff(s) inhaled  rosuvastatin 20 mg oral capsule: 1 cap(s) orally once a day (at bedtime)  senna leaf extract oral tablet: 2 tab(s) orally once a day (at bedtime)  test strips (per patient&#x27;s insurance): 1 application subcutaneously 4 times a day. ** Compatible with patient&#x27;s glucometer **

## 2023-10-02 NOTE — DIETITIAN INITIAL EVALUATION ADULT - PERTINENT MEDS FT
MEDICATIONS  (STANDING):  apixaban 5 milliGRAM(s) Oral every 12 hours  atorvastatin 80 milliGRAM(s) Oral at bedtime  dextrose 5%. 1000 milliLiter(s) (50 mL/Hr) IV Continuous <Continuous>  dextrose 5%. 1000 milliLiter(s) (100 mL/Hr) IV Continuous <Continuous>  dextrose 50% Injectable 25 Gram(s) IV Push once  dextrose 50% Injectable 25 Gram(s) IV Push once  dextrose 50% Injectable 12.5 Gram(s) IV Push once  dextrose 50% Injectable 25 Gram(s) IV Push once  furosemide    Tablet 40 milliGRAM(s) Oral daily  glucagon  Injectable 1 milliGRAM(s) IntraMuscular once  glucagon  Injectable 1 milliGRAM(s) IntraMuscular once  influenza  Vaccine (HIGH DOSE) 0.7 milliLiter(s) IntraMuscular once  insulin glargine Injectable (LANTUS) 30 Unit(s) SubCutaneous every morning  insulin glargine Injectable (LANTUS) 30 Unit(s) SubCutaneous at bedtime  insulin lispro (ADMELOG) corrective regimen sliding scale   SubCutaneous three times a day before meals  insulin lispro (ADMELOG) corrective regimen sliding scale   SubCutaneous at bedtime  insulin lispro Injectable (ADMELOG) 15 Unit(s) SubCutaneous three times a day before meals  oxyCODONE  ER Tablet 60 milliGRAM(s) Oral every 12 hours  polyethylene glycol 3350 17 Gram(s) Oral daily  senna 2 Tablet(s) Oral at bedtime    MEDICATIONS  (PRN):  acetaminophen     Tablet .. 650 milliGRAM(s) Oral every 4 hours PRN Mild Pain (1 - 3)  aluminum hydroxide/magnesium hydroxide/simethicone Suspension 30 milliLiter(s) Oral every 4 hours PRN Dyspepsia  dextrose Oral Gel 15 Gram(s) Oral once PRN Blood Glucose LESS THAN 70 milliGRAM(s)/deciliter  dextrose Oral Gel 15 Gram(s) Oral once PRN Blood Glucose LESS THAN 70 milliGRAM(s)/deciliter  magnesium hydroxide Suspension 30 milliLiter(s) Oral daily PRN Constipation  melatonin 3 milliGRAM(s) Oral at bedtime PRN Insomnia  ondansetron Injectable 4 milliGRAM(s) IV Push every 8 hours PRN Nausea and/or Vomiting  oxyCODONE    IR 5 milliGRAM(s) Oral every 6 hours PRN Moderate Pain (4 - 6)

## 2023-10-02 NOTE — DIETITIAN INITIAL EVALUATION ADULT - ADD RECOMMEND
Encourage diet compliance and improved appetite  Edward BID to provide 90kcal, 14gr amino acids/each packet to provide L-glutamine, L-arganine and hydrolyzed collagen for wound healing   Rx: Nephrovite daily, vitamin C (500mg daily) to aid in wound healing.

## 2023-10-02 NOTE — PROGRESS NOTE ADULT - ATTENDING COMMENTS
morbid obesity  lying in bed  needs [facility]  c/o anal fissures  sitz abtha ndlactulose added    await ins auth for [facility]

## 2023-10-02 NOTE — PROGRESS NOTE ADULT - ASSESSMENT
67yo F w/ Obesity, HTN, HLD, DM2, HFpEF, CKD III, DVT, Uterine CA, suspected PE, sev pUlm HTN, was discharged from [facility] with recs of [facility]. Pt declined, went home, fell and returned to our ED.    Patient is medically stable. Pending [facility] authorization    # Garth LE weakness  # fall prior to arrival 2/2 to weakness  -Pending [facility] authorization  -UA neg  -Clinically stable for d/c    # DM2 uncontrolled  -Insulin regimen increased (AM insulin increased by 5 units)  -C/w rest of regimen, monitor and assess    # HTN, HLD, HFpEF  -C/w home meds (statins and lasix)   -Encourage f/u outpatient cardiology for med recs     # CKD stg 3  -avoid nephrotoxics as possible    # VTE, Sev Pulm HTN  -On full A/C      Dispo: Pending [facility] auth, process ongoing

## 2023-10-02 NOTE — DISCHARGE NOTE PROVIDER - DETAILS OF MALNUTRITION DIAGNOSIS/DIAGNOSES
This patient has been assessed with a concern for Malnutrition and was treated during this hospitalization for the following Nutrition diagnosis/diagnoses:     -  10/02/2023: Moderate protein-calorie malnutrition   -  10/02/2023: Morbid obesity (BMI > 40)

## 2023-10-03 LAB
-  AMIKACIN: SIGNIFICANT CHANGE UP
-  AMPICILLIN: SIGNIFICANT CHANGE UP
-  AZTREONAM: SIGNIFICANT CHANGE UP
-  CEFEPIME: SIGNIFICANT CHANGE UP
-  CEFTAZIDIME: SIGNIFICANT CHANGE UP
-  CIPROFLOXACIN: SIGNIFICANT CHANGE UP
-  CIPROFLOXACIN: SIGNIFICANT CHANGE UP
-  DAPTOMYCIN: SIGNIFICANT CHANGE UP
-  GENTAMICIN: SIGNIFICANT CHANGE UP
-  IMIPENEM: SIGNIFICANT CHANGE UP
-  LEVOFLOXACIN: SIGNIFICANT CHANGE UP
-  LEVOFLOXACIN: SIGNIFICANT CHANGE UP
-  LINEZOLID: SIGNIFICANT CHANGE UP
-  MEROPENEM: SIGNIFICANT CHANGE UP
-  NITROFURANTOIN: SIGNIFICANT CHANGE UP
-  PIPERACILLIN/TAZOBACTAM: SIGNIFICANT CHANGE UP
-  TETRACYCLINE: SIGNIFICANT CHANGE UP
-  TOBRAMYCIN: SIGNIFICANT CHANGE UP
-  VANCOMYCIN: SIGNIFICANT CHANGE UP
CULTURE RESULTS: SIGNIFICANT CHANGE UP
GLUCOSE BLDC GLUCOMTR-MCNC: 121 MG/DL — HIGH (ref 70–99)
GLUCOSE BLDC GLUCOMTR-MCNC: 138 MG/DL — HIGH (ref 70–99)
GLUCOSE BLDC GLUCOMTR-MCNC: 151 MG/DL — HIGH (ref 70–99)
GLUCOSE BLDC GLUCOMTR-MCNC: 153 MG/DL — HIGH (ref 70–99)
METHOD TYPE: SIGNIFICANT CHANGE UP
METHOD TYPE: SIGNIFICANT CHANGE UP
ORGANISM # SPEC MICROSCOPIC CNT: SIGNIFICANT CHANGE UP
SPECIMEN SOURCE: SIGNIFICANT CHANGE UP

## 2023-10-03 PROCEDURE — 99232 SBSQ HOSP IP/OBS MODERATE 35: CPT | Mod: GC

## 2023-10-03 RX ORDER — NYSTATIN CREAM 100000 [USP'U]/G
1 CREAM TOPICAL ONCE
Refills: 0 | Status: COMPLETED | OUTPATIENT
Start: 2023-10-03 | End: 2023-10-03

## 2023-10-03 RX ADMIN — SENNA PLUS 2 TABLET(S): 8.6 TABLET ORAL at 22:12

## 2023-10-03 RX ADMIN — OXYCODONE HYDROCHLORIDE 60 MILLIGRAM(S): 5 TABLET ORAL at 17:47

## 2023-10-03 RX ADMIN — APIXABAN 5 MILLIGRAM(S): 2.5 TABLET, FILM COATED ORAL at 06:28

## 2023-10-03 RX ADMIN — ONDANSETRON 4 MILLIGRAM(S): 8 TABLET, FILM COATED ORAL at 20:35

## 2023-10-03 RX ADMIN — Medication 40 MILLIGRAM(S): at 06:28

## 2023-10-03 RX ADMIN — OXYCODONE HYDROCHLORIDE 5 MILLIGRAM(S): 5 TABLET ORAL at 08:38

## 2023-10-03 RX ADMIN — ATORVASTATIN CALCIUM 80 MILLIGRAM(S): 80 TABLET, FILM COATED ORAL at 22:12

## 2023-10-03 RX ADMIN — INSULIN GLARGINE 30 UNIT(S): 100 INJECTION, SOLUTION SUBCUTANEOUS at 22:14

## 2023-10-03 RX ADMIN — POLYETHYLENE GLYCOL 3350 17 GRAM(S): 17 POWDER, FOR SOLUTION ORAL at 13:21

## 2023-10-03 RX ADMIN — OXYCODONE HYDROCHLORIDE 5 MILLIGRAM(S): 5 TABLET ORAL at 21:35

## 2023-10-03 RX ADMIN — Medication 15 UNIT(S): at 12:16

## 2023-10-03 RX ADMIN — Medication 15 UNIT(S): at 17:39

## 2023-10-03 RX ADMIN — OXYCODONE HYDROCHLORIDE 60 MILLIGRAM(S): 5 TABLET ORAL at 18:47

## 2023-10-03 RX ADMIN — APIXABAN 5 MILLIGRAM(S): 2.5 TABLET, FILM COATED ORAL at 17:47

## 2023-10-03 RX ADMIN — NYSTATIN CREAM 1 APPLICATION(S): 100000 CREAM TOPICAL at 22:20

## 2023-10-03 RX ADMIN — OXYCODONE HYDROCHLORIDE 60 MILLIGRAM(S): 5 TABLET ORAL at 06:29

## 2023-10-03 RX ADMIN — OXYCODONE HYDROCHLORIDE 5 MILLIGRAM(S): 5 TABLET ORAL at 09:38

## 2023-10-03 RX ADMIN — ONDANSETRON 4 MILLIGRAM(S): 8 TABLET, FILM COATED ORAL at 00:42

## 2023-10-03 RX ADMIN — Medication 15 UNIT(S): at 08:03

## 2023-10-03 RX ADMIN — INSULIN GLARGINE 30 UNIT(S): 100 INJECTION, SOLUTION SUBCUTANEOUS at 08:06

## 2023-10-03 RX ADMIN — OXYCODONE HYDROCHLORIDE 5 MILLIGRAM(S): 5 TABLET ORAL at 20:35

## 2023-10-03 RX ADMIN — Medication 2: at 12:14

## 2023-10-03 RX ADMIN — MAGNESIUM HYDROXIDE 30 MILLILITER(S): 400 TABLET, CHEWABLE ORAL at 12:16

## 2023-10-03 NOTE — PROGRESS NOTE ADULT - ASSESSMENT
67yo F w/ Obesity, HTN, HLD, DM2, HFpEF, CKD III, DVT, Uterine CA, suspected PE, sev pUlm HTN, was discharged from [facility] with recs of [facility]. Pt declined, went home, fell and returned to our ED.    Patient is medically stable. Pending [facility] authorization    # Garth LE weakness  # fall prior to arrival 2/2 to weakness  -Pending [facility] authorization  -UA neg  -Clinically stable for d/c    # DM2 uncontrolled  -Insulin regimen increased (AM insulin increased by 5 units)  -C/w rest of regimen, monitor and assess    # HTN, HLD, HFpEF  -C/w home meds (statins and lasix)   -Encourage f/u outpatient cardiology for med recs     # CKD stg 3  -avoid nephrotoxics as possible    # VTE, Sev Pulm HTN  -On full A/C      Dispo: Pending [facility] auth, process ongoing   69yo F w/ Obesity, HTN, HLD, DM2, HFpEF, CKD III, DVT, Uterine CA, suspected PE, sev pUlm HTN, was discharged from [facility] with recs of [facility]. Pt declined, went home, fell and returned to our ED.    Patient is medically stable. Pending [facility] authorization    # Garth LE weakness  # fall prior to arrival 2/2 to weakness  -Pending [facility] authorization  -UA neg  -Clinically stable for d/c    # c/o anal fissures  Sitz  bath  aggressive bowel regimen with stool softeners    # DM2 uncontrolled  -Insulin regimen increased (AM insulin increased by 5 units)  -C/w rest of regimen, monitor and assess    # HTN, HLD, HFpEF  -C/w home meds (statins and lasix)   -Encourage f/u outpatient cardiology for med recs     # CKD stg 3  -avoid nephrotoxics as possible    # VTE, Sev Pulm HTN  -On full A/C      Dispo: Pending [facility] auth, process ongoing

## 2023-10-03 NOTE — PROGRESS NOTE ADULT - ATTENDING COMMENTS
weakness sec to functional decline/ morbid obesity  requires Meagan  Await ins auth for same    no acute issues weakness sec to functional decline/ morbid obesity  requires Meagan  Await ins auth for same    no acute issues    U c/s < 50 K colonies.  colonization  asymptomatic  no need to treat

## 2023-10-03 NOTE — PROGRESS NOTE ADULT - SUBJECTIVE AND OBJECTIVE BOX
67yo F w/ Obesity, HTN, HLD, DM2, HFpEF, CKD III, DVT, Uterine CA, suspected PE, sev pUlm HTN, was discharged from [facility] with recs of [facility]. Pt declined, went home, fell and returned to our ED.  now awaits [facility]    No acute events overnight. This morning patient in good spirits and looking forward to discharge to [facility]. Still awaiting authorization.    ROS:  CONSTITUTIONAL: Denies fever, chills, fatigue  HEENT: Denies acute changes in vision and hearing  CARDIO: Denies CP, SOB, palpitations  PULM: Denies cough, wheezing, SOB  ABD: Denies abd pain, n/v/d/c  : Denies dysuria, urinary frequency  NEURO: Denies HA, numbness/tingling  EXTREMITIES: Denies LE swelling, calf pain      MEDICATIONS  (STANDING):  apixaban 5 milliGRAM(s) Oral every 12 hours  atorvastatin 80 milliGRAM(s) Oral at bedtime  dextrose 5%. 1000 milliLiter(s) (100 mL/Hr) IV Continuous <Continuous>  dextrose 5%. 1000 milliLiter(s) (50 mL/Hr) IV Continuous <Continuous>  dextrose 50% Injectable 25 Gram(s) IV Push once  dextrose 50% Injectable 12.5 Gram(s) IV Push once  dextrose 50% Injectable 25 Gram(s) IV Push once  dextrose 50% Injectable 25 Gram(s) IV Push once  furosemide    Tablet 40 milliGRAM(s) Oral daily  glucagon  Injectable 1 milliGRAM(s) IntraMuscular once  glucagon  Injectable 1 milliGRAM(s) IntraMuscular once  influenza  Vaccine (HIGH DOSE) 0.7 milliLiter(s) IntraMuscular once  insulin glargine Injectable (LANTUS) 30 Unit(s) SubCutaneous every morning  insulin glargine Injectable (LANTUS) 30 Unit(s) SubCutaneous at bedtime  insulin lispro (ADMELOG) corrective regimen sliding scale   SubCutaneous three times a day before meals  insulin lispro (ADMELOG) corrective regimen sliding scale   SubCutaneous at bedtime  insulin lispro Injectable (ADMELOG) 15 Unit(s) SubCutaneous three times a day before meals  lactulose Syrup 10 Gram(s) Oral daily  oxyCODONE  ER Tablet 60 milliGRAM(s) Oral every 12 hours  polyethylene glycol 3350 17 Gram(s) Oral daily  senna 2 Tablet(s) Oral at bedtime    MEDICATIONS  (PRN):  acetaminophen     Tablet .. 650 milliGRAM(s) Oral every 4 hours PRN Mild Pain (1 - 3)  aluminum hydroxide/magnesium hydroxide/simethicone Suspension 30 milliLiter(s) Oral every 4 hours PRN Dyspepsia  dextrose Oral Gel 15 Gram(s) Oral once PRN Blood Glucose LESS THAN 70 milliGRAM(s)/deciliter  dextrose Oral Gel 15 Gram(s) Oral once PRN Blood Glucose LESS THAN 70 milliGRAM(s)/deciliter  magnesium hydroxide Suspension 30 milliLiter(s) Oral daily PRN Constipation  melatonin 3 milliGRAM(s) Oral at bedtime PRN Insomnia  ondansetron Injectable 4 milliGRAM(s) IV Push every 8 hours PRN Nausea and/or Vomiting  oxyCODONE    IR 5 milliGRAM(s) Oral every 6 hours PRN Moderate Pain (4 - 6)      Allergies    latex (Rash)  wool- rash, itch (Other)  adhesives (Rash)  Bactrim (Flushing)    Intolerances          Vital Signs Last 24 Hrs  T(C): 36.3 (03 Oct 2023 05:49), Max: 36.7 (02 Oct 2023 20:00)  T(F): 97.4 (03 Oct 2023 05:49), Max: 98 (02 Oct 2023 20:00)  HR: 69 (03 Oct 2023 05:49) (68 - 74)  BP: 116/63 (03 Oct 2023 05:49) (101/53 - 116/63)  BP(mean): --  RR: 17 (03 Oct 2023 05:49) (16 - 18)  SpO2: 97% (03 Oct 2023 05:49) (92% - 98%)    Parameters below as of 03 Oct 2023 05:49  Patient On (Oxygen Delivery Method): nasal cannula  O2 Flow (L/min): 4      PHYSICAL EXAM:  GENERAL: A&Ox3, no acute distress, comfortably in bed  HEENT:  Atraumatic, normocephalic, non-icteric sclera, no cervical LAD, neck supple, no JVD, thyroid normal  NEURO/PSYCH:  No focal deficits, normal affect, strength 5/5 all 4 extremities  LUNGS: CTAB, no wrr, non-labored breathing  HEART: RRR, no murmur appreciated  ABD: Soft, non-tender, non-distended, no organomegaly, no appreciable masses, +bs all 4 quadrants  EXTREMITIES:  Nontender, no clubbing, cyanosis, or edema  SKIN: No rashes or lesions      LABS:      Phos  4.2     10-02  Mg     2.1     10-02          CAPILLARY BLOOD GLUCOSE      POCT Blood Glucose.: 138 mg/dL (03 Oct 2023 08:01)  POCT Blood Glucose.: 126 mg/dL (02 Oct 2023 23:25)  POCT Blood Glucose.: 172 mg/dL (02 Oct 2023 17:26)  POCT Blood Glucose.: 225 mg/dL (02 Oct 2023 12:57)      RADIOLOGY & ADDITIONAL TESTS:      Imaging Personally Reviewed:  [  ] YES  [  ] NO    Consultant(s) Notes Reviewed:  [  ] YES  [  ] NO    Care Discussed with Consultants/Other Providers [  ] YES  [  ] NO    Plan of Care discussed with Housestaff [ X ]YES [  ] NO 69yo F w/ Obesity, HTN, HLD, DM2, HFpEF, CKD III, DVT, Uterine CA, suspected PE, sev pUlm HTN, was discharged from [facility] with recs of [facility]. Pt declined, went home, fell and returned to our ED.  now awaits [facility]    No acute events overnight. This morning patient in good spirits and looking forward to discharge to [facility]. Still awaiting authorization.    ROS:  CONSTITUTIONAL: Denies fever, chills, fatigue  HEENT: Denies acute changes in vision and hearing  CARDIO: Denies CP, SOB, palpitations  PULM: Denies cough, wheezing, SOB  ABD: Denies abd pain, n/v/d/c  : Denies dysuria, urinary frequency  NEURO: Denies HA, numbness/tingling  EXTREMITIES: Denies LE swelling, calf pain      MEDICATIONS  (STANDING):  apixaban 5 milliGRAM(s) Oral every 12 hours  atorvastatin 80 milliGRAM(s) Oral at bedtime  dextrose 5%. 1000 milliLiter(s) (100 mL/Hr) IV Continuous <Continuous>  dextrose 5%. 1000 milliLiter(s) (50 mL/Hr) IV Continuous <Continuous>  dextrose 50% Injectable 25 Gram(s) IV Push once  dextrose 50% Injectable 12.5 Gram(s) IV Push once  dextrose 50% Injectable 25 Gram(s) IV Push once  dextrose 50% Injectable 25 Gram(s) IV Push once  furosemide    Tablet 40 milliGRAM(s) Oral daily  glucagon  Injectable 1 milliGRAM(s) IntraMuscular once  glucagon  Injectable 1 milliGRAM(s) IntraMuscular once  influenza  Vaccine (HIGH DOSE) 0.7 milliLiter(s) IntraMuscular once  insulin glargine Injectable (LANTUS) 30 Unit(s) SubCutaneous every morning  insulin glargine Injectable (LANTUS) 30 Unit(s) SubCutaneous at bedtime  insulin lispro (ADMELOG) corrective regimen sliding scale   SubCutaneous three times a day before meals  insulin lispro (ADMELOG) corrective regimen sliding scale   SubCutaneous at bedtime  insulin lispro Injectable (ADMELOG) 15 Unit(s) SubCutaneous three times a day before meals  lactulose Syrup 10 Gram(s) Oral daily  oxyCODONE  ER Tablet 60 milliGRAM(s) Oral every 12 hours  polyethylene glycol 3350 17 Gram(s) Oral daily  senna 2 Tablet(s) Oral at bedtime    MEDICATIONS  (PRN):  acetaminophen     Tablet .. 650 milliGRAM(s) Oral every 4 hours PRN Mild Pain (1 - 3)  aluminum hydroxide/magnesium hydroxide/simethicone Suspension 30 milliLiter(s) Oral every 4 hours PRN Dyspepsia  dextrose Oral Gel 15 Gram(s) Oral once PRN Blood Glucose LESS THAN 70 milliGRAM(s)/deciliter  dextrose Oral Gel 15 Gram(s) Oral once PRN Blood Glucose LESS THAN 70 milliGRAM(s)/deciliter  magnesium hydroxide Suspension 30 milliLiter(s) Oral daily PRN Constipation  melatonin 3 milliGRAM(s) Oral at bedtime PRN Insomnia  ondansetron Injectable 4 milliGRAM(s) IV Push every 8 hours PRN Nausea and/or Vomiting  oxyCODONE    IR 5 milliGRAM(s) Oral every 6 hours PRN Moderate Pain (4 - 6)      Allergies    latex (Rash)  wool- rash, itch (Other)  adhesives (Rash)  Bactrim (Flushing)      Vital Signs Last 24 Hrs  T(C): 36.3 (03 Oct 2023 05:49), Max: 36.7 (02 Oct 2023 20:00)  T(F): 97.4 (03 Oct 2023 05:49), Max: 98 (02 Oct 2023 20:00)  HR: 69 (03 Oct 2023 05:49) (68 - 74)  BP: 116/63 (03 Oct 2023 05:49) (101/53 - 116/63)  BP(mean): --  RR: 17 (03 Oct 2023 05:49) (16 - 18)  SpO2: 97% (03 Oct 2023 05:49) (92% - 98%)    Parameters below as of 03 Oct 2023 05:49  Patient On (Oxygen Delivery Method): nasal cannula  O2 Flow (L/min): 4      PHYSICAL EXAM:  GENERAL: A&Ox3, no acute distress, comfortably in bed  HEENT:  Atraumatic, normocephalic, non-icteric sclera, no cervical LAD, neck supple, no JVD, thyroid normal  NEURO/PSYCH:  No focal deficits, normal affect, strength 5/5 all 4 extremities  LUNGS: CTAB, no wrr, non-labored breathing  HEART: RRR, no murmur appreciated  ABD: Soft, non-tender, non-distended, no organomegaly, no appreciable masses, +bs all 4 quadrants  EXTREMITIES:  Nontender, no clubbing, cyanosis, or edema  SKIN: No rashes or lesions      LABS:      Phos  4.2     10-02  Mg     2.1     10-02    CAPILLARY BLOOD GLUCOSE      POCT Blood Glucose.: 138 mg/dL (03 Oct 2023 08:01)  POCT Blood Glucose.: 126 mg/dL (02 Oct 2023 23:25)  POCT Blood Glucose.: 172 mg/dL (02 Oct 2023 17:26)  POCT Blood Glucose.: 225 mg/dL (02 Oct 2023 12:57)      RADIOLOGY & ADDITIONAL TESTS:      Imaging Personally Reviewed:  [  ] YES  [  ] NO    Consultant(s) Notes Reviewed:  [  ] YES  [  ] NO    Care Discussed with Consultants/Other Providers [  ] YES  [  ] NO    Plan of Care discussed with Housestaff [ X ]YES [  ] NO

## 2023-10-04 ENCOUNTER — NON-APPOINTMENT (OUTPATIENT)
Age: 68
End: 2023-10-04

## 2023-10-04 ENCOUNTER — TRANSCRIPTION ENCOUNTER (OUTPATIENT)
Age: 68
End: 2023-10-04

## 2023-10-04 VITALS
DIASTOLIC BLOOD PRESSURE: 93 MMHG | HEART RATE: 73 BPM | TEMPERATURE: 98 F | OXYGEN SATURATION: 96 % | SYSTOLIC BLOOD PRESSURE: 103 MMHG | RESPIRATION RATE: 18 BRPM

## 2023-10-04 LAB
ANION GAP SERPL CALC-SCNC: 12 MMOL/L — SIGNIFICANT CHANGE UP (ref 5–17)
BUN SERPL-MCNC: 44.7 MG/DL — HIGH (ref 8–20)
CALCIUM SERPL-MCNC: 9.9 MG/DL — SIGNIFICANT CHANGE UP (ref 8.4–10.5)
CHLORIDE SERPL-SCNC: 97 MMOL/L — SIGNIFICANT CHANGE UP (ref 96–108)
CO2 SERPL-SCNC: 30 MMOL/L — HIGH (ref 22–29)
CREAT SERPL-MCNC: 1.75 MG/DL — HIGH (ref 0.5–1.3)
EGFR: 31 ML/MIN/1.73M2 — LOW
GLUCOSE BLDC GLUCOMTR-MCNC: 158 MG/DL — HIGH (ref 70–99)
GLUCOSE BLDC GLUCOMTR-MCNC: 170 MG/DL — HIGH (ref 70–99)
GLUCOSE SERPL-MCNC: 155 MG/DL — HIGH (ref 70–99)
MAGNESIUM SERPL-MCNC: 2 MG/DL — SIGNIFICANT CHANGE UP (ref 1.6–2.6)
POTASSIUM SERPL-MCNC: 4.3 MMOL/L — SIGNIFICANT CHANGE UP (ref 3.5–5.3)
POTASSIUM SERPL-SCNC: 4.3 MMOL/L — SIGNIFICANT CHANGE UP (ref 3.5–5.3)
SODIUM SERPL-SCNC: 139 MMOL/L — SIGNIFICANT CHANGE UP (ref 135–145)

## 2023-10-04 PROCEDURE — 85025 COMPLETE CBC W/AUTO DIFF WBC: CPT

## 2023-10-04 PROCEDURE — 85610 PROTHROMBIN TIME: CPT

## 2023-10-04 PROCEDURE — 87077 CULTURE AEROBIC IDENTIFY: CPT

## 2023-10-04 PROCEDURE — 87186 SC STD MICRODIL/AGAR DIL: CPT

## 2023-10-04 PROCEDURE — 36415 COLL VENOUS BLD VENIPUNCTURE: CPT

## 2023-10-04 PROCEDURE — 81001 URINALYSIS AUTO W/SCOPE: CPT

## 2023-10-04 PROCEDURE — 87635 SARS-COV-2 COVID-19 AMP PRB: CPT

## 2023-10-04 PROCEDURE — 97530 THERAPEUTIC ACTIVITIES: CPT

## 2023-10-04 PROCEDURE — 84100 ASSAY OF PHOSPHORUS: CPT

## 2023-10-04 PROCEDURE — 80053 COMPREHEN METABOLIC PANEL: CPT

## 2023-10-04 PROCEDURE — 99285 EMERGENCY DEPT VISIT HI MDM: CPT | Mod: 25

## 2023-10-04 PROCEDURE — 93005 ELECTROCARDIOGRAM TRACING: CPT

## 2023-10-04 PROCEDURE — 97110 THERAPEUTIC EXERCISES: CPT

## 2023-10-04 PROCEDURE — 83735 ASSAY OF MAGNESIUM: CPT

## 2023-10-04 PROCEDURE — 80048 BASIC METABOLIC PNL TOTAL CA: CPT

## 2023-10-04 PROCEDURE — 87086 URINE CULTURE/COLONY COUNT: CPT

## 2023-10-04 PROCEDURE — 82962 GLUCOSE BLOOD TEST: CPT

## 2023-10-04 PROCEDURE — 85730 THROMBOPLASTIN TIME PARTIAL: CPT

## 2023-10-04 PROCEDURE — G0378: CPT

## 2023-10-04 PROCEDURE — 99239 HOSP IP/OBS DSCHRG MGMT >30: CPT | Mod: GC

## 2023-10-04 PROCEDURE — 90662 IIV NO PRSV INCREASED AG IM: CPT

## 2023-10-04 RX ORDER — FENTANYL CITRATE 50 UG/ML
1 INJECTION INTRAVENOUS
Refills: 0 | Status: DISCONTINUED | OUTPATIENT
Start: 2023-10-04 | End: 2023-10-04

## 2023-10-04 RX ORDER — SENNA PLUS 8.6 MG/1
2 TABLET ORAL
Qty: 0 | Refills: 0 | DISCHARGE
Start: 2023-10-04

## 2023-10-04 RX ORDER — OXYCODONE HYDROCHLORIDE 5 MG/1
1 TABLET ORAL
Qty: 0 | Refills: 0 | DISCHARGE
Start: 2023-10-04

## 2023-10-04 RX ORDER — INSULIN GLARGINE 100 [IU]/ML
0 INJECTION, SOLUTION SUBCUTANEOUS
Refills: 0 | DISCHARGE

## 2023-10-04 RX ORDER — OXYCODONE HYDROCHLORIDE 5 MG/1
1 TABLET ORAL
Refills: 0 | DISCHARGE

## 2023-10-04 RX ORDER — MEROPENEM 1 G/30ML
1000 INJECTION INTRAVENOUS EVERY 12 HOURS
Refills: 0 | Status: DISCONTINUED | OUTPATIENT
Start: 2023-10-04 | End: 2023-10-04

## 2023-10-04 RX ADMIN — INSULIN GLARGINE 30 UNIT(S): 100 INJECTION, SOLUTION SUBCUTANEOUS at 08:44

## 2023-10-04 RX ADMIN — INFLUENZA VIRUS VACCINE 0.7 MILLILITER(S): 15; 15; 15; 15 SUSPENSION INTRAMUSCULAR at 13:14

## 2023-10-04 RX ADMIN — LACTULOSE 10 GRAM(S): 10 SOLUTION ORAL at 11:32

## 2023-10-04 RX ADMIN — OXYCODONE HYDROCHLORIDE 5 MILLIGRAM(S): 5 TABLET ORAL at 08:44

## 2023-10-04 RX ADMIN — OXYCODONE HYDROCHLORIDE 60 MILLIGRAM(S): 5 TABLET ORAL at 06:00

## 2023-10-04 RX ADMIN — Medication 2: at 08:44

## 2023-10-04 RX ADMIN — APIXABAN 5 MILLIGRAM(S): 2.5 TABLET, FILM COATED ORAL at 05:00

## 2023-10-04 RX ADMIN — Medication 15 UNIT(S): at 11:03

## 2023-10-04 RX ADMIN — OXYCODONE HYDROCHLORIDE 5 MILLIGRAM(S): 5 TABLET ORAL at 09:44

## 2023-10-04 RX ADMIN — FENTANYL CITRATE 1 PATCH: 50 INJECTION INTRAVENOUS at 07:35

## 2023-10-04 RX ADMIN — FENTANYL CITRATE 1 PATCH: 50 INJECTION INTRAVENOUS at 14:13

## 2023-10-04 RX ADMIN — OXYCODONE HYDROCHLORIDE 60 MILLIGRAM(S): 5 TABLET ORAL at 05:00

## 2023-10-04 RX ADMIN — Medication 40 MILLIGRAM(S): at 05:00

## 2023-10-04 RX ADMIN — Medication 15 UNIT(S): at 08:43

## 2023-10-04 RX ADMIN — Medication 2: at 11:03

## 2023-10-04 RX ADMIN — FENTANYL CITRATE 1 PATCH: 50 INJECTION INTRAVENOUS at 01:13

## 2023-10-04 NOTE — PROGRESS NOTE ADULT - NUTRITIONAL ASSESSMENT
This patient has been assessed with a concern for Malnutrition and has been determined to have a diagnosis/diagnoses of Moderate protein-calorie malnutrition and Morbid obesity (BMI > 40).    This patient is being managed with:   Diet DASH/TLC-  Sodium & Cholesterol Restricted  Consistent Carbohydrate {Evening Snack} (CSTCHOSN)  Low Fat (LOWFAT)  Entered: Sep 30 2023  2:42PM  

## 2023-10-04 NOTE — DISCHARGE NOTE NURSING/CASE MANAGEMENT/SOCIAL WORK - NSDCCRNAME_GEN_ALL_CORE_FT
[facility]  [address] [place] [address], [address]  [place] [address]  Phone: [phone]

## 2023-10-04 NOTE — CHART NOTE - NSCHARTNOTEFT_GEN_A_CORE
Patient is unable to ambulate with cane or rolling walker and is unable to self propel a standard wheelchair.   Patient requires a transport wheelchair for ADL's in the home due to limited mobility.   Patient has a caregiver willing to assist.

## 2023-10-04 NOTE — PROVIDER CONTACT NOTE (CRITICAL VALUE NOTIFICATION) - TEST AND RESULT REPORTED:
10,000- 49,000 cfu/ml pseudomonas aeruginosa and 10,000- Urine culture from 09/30/2023 - 49,000 cfu/ml VRE

## 2023-10-04 NOTE — PROGRESS NOTE ADULT - ASSESSMENT
67yo F w/ Obesity, HTN, HLD, DM2, HFpEF, CKD III, DVT, Uterine CA, suspected PE, sev pUlm HTN, was discharged from [facility] with recs of [facility]. Pt declined, went home, fell and returned to our ED.    Patient is medically stable. Pending [facility] authorization    # Garth LE weakness  # fall prior to arrival 2/2 to weakness  -Pending [facility] authorization  -Clinically stable for d/c    #Urine culture  -Pseudomonas, enterococcus  -Asymptomatic  -No need to treat    # c/o anal fissures  -Sitz  bath for symptomatic control  -aggressive bowel regimen with stool softeners    # DM2 uncontrolled  -Insulin regimen increased (AM insulin increased by 5 units)  -C/w rest of regimen, monitor and assess    # HTN, HLD, HFpEF  -C/w home meds (statins and lasix)   -Encourage f/u outpatient cardiology for med recs     # CKD stg 3  -avoid nephrotoxics as possible    # VTE, Sev Pulm HTN  -On full A/C      Dispo: Pending [facility] auth, process ongoing

## 2023-10-04 NOTE — PROGRESS NOTE ADULT - SUBJECTIVE AND OBJECTIVE BOX
67yo F w/ Obesity, HTN, HLD, DM2, HFpEF, CKD III, DVT, Uterine CA, suspected PE, sev pUlm HTN, was discharged from [facility] with recs of [facility]. Pt declined, went home, fell and returned to our ED.  now awaits RO[facility]    Overnight, patient reported discomfort from fungal dermatitis under breasts/stomach. Given fentanyl patch for back pain. Urine culture resulted with pseudomonas/enterococcus. Because patient is asymptomatic, no need to treat.     Patient is stable and ready for d/c. Pending [facility] authorization    ROS:  CONSTITUTIONAL: Denies fever, chills, fatigue  HEENT: Denies acute changes in vision and hearing  CARDIO: Denies CP, SOB, palpitations  PULM: Denies cough, wheezing, SOB  ABD: Denies abd pain, n/v/d/c  : Denies dysuria, urinary frequency  NEURO: Denies HA, numbness/tingling  EXTREMITIES: Denies LE swelling, calf pain      MEDICATIONS  (STANDING):  apixaban 5 milliGRAM(s) Oral every 12 hours  atorvastatin 80 milliGRAM(s) Oral at bedtime  dextrose 5%. 1000 milliLiter(s) (50 mL/Hr) IV Continuous <Continuous>  dextrose 5%. 1000 milliLiter(s) (100 mL/Hr) IV Continuous <Continuous>  dextrose 50% Injectable 25 Gram(s) IV Push once  dextrose 50% Injectable 12.5 Gram(s) IV Push once  dextrose 50% Injectable 25 Gram(s) IV Push once  dextrose 50% Injectable 25 Gram(s) IV Push once  fentaNYL   Patch  12 MICROgram(s)/Hr. 1 Patch Transdermal every 48 hours  furosemide    Tablet 40 milliGRAM(s) Oral daily  glucagon  Injectable 1 milliGRAM(s) IntraMuscular once  glucagon  Injectable 1 milliGRAM(s) IntraMuscular once  influenza  Vaccine (HIGH DOSE) 0.7 milliLiter(s) IntraMuscular once  insulin glargine Injectable (LANTUS) 30 Unit(s) SubCutaneous every morning  insulin glargine Injectable (LANTUS) 30 Unit(s) SubCutaneous at bedtime  insulin lispro (ADMELOG) corrective regimen sliding scale   SubCutaneous three times a day before meals  insulin lispro (ADMELOG) corrective regimen sliding scale   SubCutaneous at bedtime  insulin lispro Injectable (ADMELOG) 15 Unit(s) SubCutaneous three times a day before meals  lactulose Syrup 10 Gram(s) Oral daily  oxyCODONE  ER Tablet 60 milliGRAM(s) Oral every 12 hours  polyethylene glycol 3350 17 Gram(s) Oral daily  senna 2 Tablet(s) Oral at bedtime    MEDICATIONS  (PRN):  acetaminophen     Tablet .. 650 milliGRAM(s) Oral every 4 hours PRN Mild Pain (1 - 3)  aluminum hydroxide/magnesium hydroxide/simethicone Suspension 30 milliLiter(s) Oral every 4 hours PRN Dyspepsia  dextrose Oral Gel 15 Gram(s) Oral once PRN Blood Glucose LESS THAN 70 milliGRAM(s)/deciliter  dextrose Oral Gel 15 Gram(s) Oral once PRN Blood Glucose LESS THAN 70 milliGRAM(s)/deciliter  magnesium hydroxide Suspension 30 milliLiter(s) Oral daily PRN Constipation  melatonin 3 milliGRAM(s) Oral at bedtime PRN Insomnia  ondansetron Injectable 4 milliGRAM(s) IV Push every 8 hours PRN Nausea and/or Vomiting  oxyCODONE    IR 5 milliGRAM(s) Oral every 6 hours PRN Moderate Pain (4 - 6)      Allergies    latex (Rash)  wool- rash, itch (Other)  adhesives (Rash)  Bactrim (Flushing)    Intolerances          Vital Signs Last 24 Hrs  T(C): 36.6 (04 Oct 2023 04:33), Max: 36.9 (03 Oct 2023 11:56)  T(F): 97.9 (04 Oct 2023 04:33), Max: 98.4 (03 Oct 2023 11:56)  HR: 69 (04 Oct 2023 04:33) (65 - 73)  BP: 135/68 (04 Oct 2023 04:33) (111/60 - 135/68)  BP(mean): --  RR: 18 (04 Oct 2023 04:33) (18 - 18)  SpO2: 96% (04 Oct 2023 04:33) (95% - 97%)    Parameters below as of 04 Oct 2023 04:33  Patient On (Oxygen Delivery Method): nasal cannula  O2 Flow (L/min): 4      PHYSICAL EXAM:  GENERAL: A&Ox3, no acute distress, comfortably in bed  HEENT:  Atraumatic, normocephalic, non-icteric sclera, no cervical LAD, neck supple, no JVD, thyroid normal  NEURO/PSYCH:  No focal deficits, normal affect, strength 5/5 all 4 extremities  LUNGS: CTAB, no wrr, non-labored breathing  HEART: RRR, no murmur appreciated  ABD: Soft, non-tender, non-distended, no organomegaly, no appreciable masses, +bs all 4 quadrants  EXTREMITIES:  Nontender, no clubbing, cyanosis, or edema  SKIN: No rashes or lesions      LABS:              CAPILLARY BLOOD GLUCOSE      POCT Blood Glucose.: 151 mg/dL (03 Oct 2023 22:09)  POCT Blood Glucose.: 121 mg/dL (03 Oct 2023 17:38)  POCT Blood Glucose.: 153 mg/dL (03 Oct 2023 12:09)  POCT Blood Glucose.: 138 mg/dL (03 Oct 2023 08:01)      RADIOLOGY & ADDITIONAL TESTS:      Imaging Personally Reviewed:  [  ] YES  [  ] NO    Consultant(s) Notes Reviewed:  [  ] YES  [  ] NO    Care Discussed with Consultants/Other Providers [  ] YES  [  ] NO    Plan of Care discussed with Housestaff [ X ]YES [  ] NO

## 2023-10-04 NOTE — DISCHARGE NOTE NURSING/CASE MANAGEMENT/SOCIAL WORK - NSDCVIVACCINE_GEN_ALL_CORE_FT
COVID-19 vaccine, vector-nr, rS-Ad26, PF, 0.5 mL (Omar); 16-Mar-2021 13:03; Kimberly Maya (RN); [facility]; [id number] (Exp. Date: 26-May-2021); IntraMuscular; Deltoid Left.; 0.5 milliLiter(s);   COVID-19, mRNA, LNP-S, PF, 100 mcg/ 0.5 mL dose (Moderna); 28-Jan-2022 10:09; Senia Juan (RN); [employer] [id number] (Exp. Date: 14-Apr-2022); IntraMuscular; Deltoid Left.; 0.25 milliLiter(s);   influenza, injectable, quadrivalent, preservative free; 09-Nov-2018 13:40; Mane Camejo (RN); [employer]; [id number] (Exp. Date: 16-Jun-2019); IntraMuscular; Deltoid Left.; 0.5 milliLiter(s); VIS (VIS Published: 07-Aug-2015, VIS Presented: 09-Nov-2018);   influenza, injectable, quadrivalent, preservative free; 15-Nov-2020 12:04; Ramiro Alexander (RN); [employer]; [id number] (Exp. Date: 30-Jun-2021); IntraMuscular; Deltoid Left.; 0.5 milliLiter(s); VIS (VIS Published: 15-Aug-2019, VIS Presented: 15-Nov-2020);   influenza, high-dose, quadrivalent; 28-Jan-2022 11:42; Senia Juan (RN); [employer]; [id number] (Exp. Date: 30-Jun-2022); IntraMuscular; Deltoid Right.; 0.7 milliLiter(s); VIS (VIS Published: 06-Aug-2021, VIS Presented: 28-Jan-2022);   Tdap; 24-Jan-2022 08:46; Lisa Yanes (RN); [employer]; [id number] (Exp. Date: 09-Sep-2023); IntraMuscular; Deltoid Left.; 0.5 milliLiter(s); VIS (VIS Published: 09-May-2013, VIS Presented: 24-Jan-2022);   Tdap; 30-Aug-2023 17:21; Edwige Reyes (RN); [employer]; [id number] (Exp. Date: 03-Oct-2025); IntraMuscular; Deltoid Left.; 0.5 milliLiter(s); VIS (VIS Published: 09-May-2013, VIS Presented: 30-Aug-2023);    COVID-19 vaccine, vector-nr, rS-Ad26, PF, 0.5 mL (Omar); 16-Mar-2021 13:03; Kimberly Maya (RN); [facility]; [id number] (Exp. Date: 26-May-2021); IntraMuscular; Deltoid Left.; 0.5 milliLiter(s);   COVID-19, mRNA, LNP-S, PF, 100 mcg/ 0.5 mL dose (Moderna); 28-Jan-2022 10:09; Senia Juan (RN); [employer] [id number] (Exp. Date: 14-Apr-2022); IntraMuscular; Deltoid Left.; 0.25 milliLiter(s);   influenza, injectable, quadrivalent, preservative free; 09-Nov-2018 13:40; Mane Camejo (RN); [employer]; [id number] (Exp. Date: 16-Jun-2019); IntraMuscular; Deltoid Left.; 0.5 milliLiter(s); VIS (VIS Published: 07-Aug-2015, VIS Presented: 09-Nov-2018);   influenza, injectable, quadrivalent, preservative free; 15-Nov-2020 12:04; Ramiro Alexander (RN); [employer]; [id number] (Exp. Date: 30-Jun-2021); IntraMuscular; Deltoid Left.; 0.5 milliLiter(s); VIS (VIS Published: 15-Aug-2019, VIS Presented: 15-Nov-2020);   influenza, high-dose, quadrivalent; 28-Jan-2022 11:42; Senia Juan (RN); [employer]; [id number] (Exp. Date: 30-Jun-2022); IntraMuscular; Deltoid Right.; 0.7 milliLiter(s); VIS (VIS Published: 06-Aug-2021, VIS Presented: 28-Jan-2022);   influenza, high-dose, quadrivalent; 04-Oct-2023 13:14; Coreen Abraham (RN); [employer]; [id number] (Exp. Date: 01-Jun-2026); IntraMuscular; Deltoid Left.; 0.7 milliLiter(s); VIS (VIS Published: 06-Aug-2021, VIS Presented: 04-Oct-2023);   Tdap; 24-Jan-2022 08:46; Lisa Yanes (RN); [employer]; [id number] (Exp. Date: 09-Sep-2023); IntraMuscular; Deltoid Left.; 0.5 milliLiter(s); VIS (VIS Published: 09-May-2013, VIS Presented: 24-Jan-2022);   Tdap; 30-Aug-2023 17:21; Edwige Reyes (DANIEL); [employer]; [id number] (Exp. Date: 03-Oct-2025); IntraMuscular; Deltoid Left.; 0.5 milliLiter(s); VIS (VIS Published: 09-May-2013, VIS Presented: 30-Aug-2023);

## 2023-10-04 NOTE — DISCHARGE NOTE NURSING/CASE MANAGEMENT/SOCIAL WORK - NSDCPEFALRISK_GEN_ALL_CORE
For information on Fall & Injury Prevention, visit: [email or web address] OR  [email or web address] OR  https://www.cdc.gov/steadi/patient.html

## 2023-10-04 NOTE — DISCHARGE NOTE NURSING/CASE MANAGEMENT/SOCIAL WORK - PATIENT PORTAL LINK FT
You can access the FollowMyHealth Patient Portal offered by [facility] by registering at the following website: [email or web address]. By joining [employer]s FollowMyHealth portal, you will also be able to view your health information using other applications (apps) compatible with our system.

## 2023-10-17 RX ORDER — BLOOD-GLUCOSE SENSOR
EACH MISCELLANEOUS
Qty: 6 | Refills: 3 | Status: ACTIVE | COMMUNITY
Start: 2022-11-15 | End: 1900-01-01

## 2023-11-10 ENCOUNTER — NON-APPOINTMENT (OUTPATIENT)
Age: 68
End: 2023-11-10

## 2023-11-10 DIAGNOSIS — K60.2 ANAL FISSURE, UNSPECIFIED: ICD-10-CM

## 2023-11-10 DIAGNOSIS — I26.09 OTHER PULMONARY EMBOLISM WITH ACUTE COR PULMONALE: ICD-10-CM

## 2023-11-10 RX ORDER — NITROGLYCERIN 4 MG/G
OINTMENT RECTAL
Refills: 0 | Status: ACTIVE | COMMUNITY

## 2023-11-13 PROCEDURE — 81001 URINALYSIS AUTO W/SCOPE: CPT

## 2023-11-13 PROCEDURE — 97162 PT EVAL MOD COMPLEX 30 MIN: CPT

## 2023-11-13 PROCEDURE — 83690 ASSAY OF LIPASE: CPT

## 2023-11-13 PROCEDURE — 73620 X-RAY EXAM OF FOOT: CPT

## 2023-11-13 PROCEDURE — 82947 ASSAY GLUCOSE BLOOD QUANT: CPT

## 2023-11-13 PROCEDURE — 97110 THERAPEUTIC EXERCISES: CPT

## 2023-11-13 PROCEDURE — 82962 GLUCOSE BLOOD TEST: CPT

## 2023-11-13 PROCEDURE — 94640 AIRWAY INHALATION TREATMENT: CPT

## 2023-11-13 PROCEDURE — 83605 ASSAY OF LACTIC ACID: CPT

## 2023-11-13 PROCEDURE — 85027 COMPLETE CBC AUTOMATED: CPT

## 2023-11-13 PROCEDURE — 93005 ELECTROCARDIOGRAM TRACING: CPT

## 2023-11-13 PROCEDURE — 71045 X-RAY EXAM CHEST 1 VIEW: CPT

## 2023-11-13 PROCEDURE — 87086 URINE CULTURE/COLONY COUNT: CPT

## 2023-11-13 PROCEDURE — 84443 ASSAY THYROID STIM HORMONE: CPT

## 2023-11-13 PROCEDURE — 80048 BASIC METABOLIC PNL TOTAL CA: CPT

## 2023-11-13 PROCEDURE — 87641 MR-STAPH DNA AMP PROBE: CPT

## 2023-11-13 PROCEDURE — 85025 COMPLETE CBC W/AUTO DIFF WBC: CPT

## 2023-11-13 PROCEDURE — 97112 NEUROMUSCULAR REEDUCATION: CPT

## 2023-11-13 PROCEDURE — 84132 ASSAY OF SERUM POTASSIUM: CPT

## 2023-11-13 PROCEDURE — 99285 EMERGENCY DEPT VISIT HI MDM: CPT

## 2023-11-13 PROCEDURE — 74176 CT ABD & PELVIS W/O CONTRAST: CPT | Mod: MA

## 2023-11-13 PROCEDURE — 83735 ASSAY OF MAGNESIUM: CPT

## 2023-11-13 PROCEDURE — 97602 WOUND(S) CARE NON-SELECTIVE: CPT

## 2023-11-13 PROCEDURE — 84100 ASSAY OF PHOSPHORUS: CPT

## 2023-11-13 PROCEDURE — 82010 KETONE BODYS QUAN: CPT

## 2023-11-13 PROCEDURE — 96375 TX/PRO/DX INJ NEW DRUG ADDON: CPT

## 2023-11-13 PROCEDURE — 87077 CULTURE AEROBIC IDENTIFY: CPT

## 2023-11-13 PROCEDURE — 84436 ASSAY OF TOTAL THYROXINE: CPT

## 2023-11-13 PROCEDURE — 87186 SC STD MICRODIL/AGAR DIL: CPT

## 2023-11-13 PROCEDURE — 82435 ASSAY OF BLOOD CHLORIDE: CPT

## 2023-11-13 PROCEDURE — 36415 COLL VENOUS BLD VENIPUNCTURE: CPT

## 2023-11-13 PROCEDURE — 85018 HEMOGLOBIN: CPT

## 2023-11-13 PROCEDURE — 85610 PROTHROMBIN TIME: CPT

## 2023-11-13 PROCEDURE — 84480 ASSAY TRIIODOTHYRONINE (T3): CPT

## 2023-11-13 PROCEDURE — 85014 HEMATOCRIT: CPT

## 2023-11-13 PROCEDURE — 96374 THER/PROPH/DIAG INJ IV PUSH: CPT

## 2023-11-13 PROCEDURE — 97530 THERAPEUTIC ACTIVITIES: CPT

## 2023-11-13 PROCEDURE — 84295 ASSAY OF SERUM SODIUM: CPT

## 2023-11-13 PROCEDURE — 85730 THROMBOPLASTIN TIME PARTIAL: CPT

## 2023-11-13 PROCEDURE — 84484 ASSAY OF TROPONIN QUANT: CPT

## 2023-11-13 PROCEDURE — 87640 STAPH A DNA AMP PROBE: CPT

## 2023-11-13 PROCEDURE — 83036 HEMOGLOBIN GLYCOSYLATED A1C: CPT

## 2023-11-13 PROCEDURE — 82803 BLOOD GASES ANY COMBINATION: CPT

## 2023-11-13 PROCEDURE — 82330 ASSAY OF CALCIUM: CPT

## 2023-11-13 PROCEDURE — 83880 ASSAY OF NATRIURETIC PEPTIDE: CPT

## 2023-11-13 PROCEDURE — 80053 COMPREHEN METABOLIC PANEL: CPT

## 2023-11-13 PROCEDURE — 84145 PROCALCITONIN (PCT): CPT

## 2023-11-14 ENCOUNTER — NON-APPOINTMENT (OUTPATIENT)
Age: 68
End: 2023-11-14

## 2023-11-14 NOTE — PHYSICAL THERAPY INITIAL EVALUATION ADULT - PHYSICAL ASSIST/NONPHYSICAL ASSIST: GAIT, REHAB EVAL
PAST MEDICAL HISTORY:  Anxiety     Arthritis     Arthritis of big toe     Asthmatic Bronchitis 1985    Crohn's colitis     Crohn's Disease 1989 last colonoscopy 12/2008    Diabetes mellitus     H/O rotator cuff tear     Hemorrhoids without Complication 1989    History of ovarian cystectomy     History of Pneumonia as child    Hyperlipidemia     Hypertension     Insomnia     Lumbar stenosis     XENA on CPAP     Reactive airway disease     
2 person assist

## 2023-11-15 ENCOUNTER — APPOINTMENT (OUTPATIENT)
Dept: INTERNAL MEDICINE | Facility: CLINIC | Age: 68
End: 2023-11-15

## 2023-11-20 ENCOUNTER — APPOINTMENT (OUTPATIENT)
Dept: INTERNAL MEDICINE | Facility: CLINIC | Age: 68
End: 2023-11-20
Payer: MEDICARE

## 2023-11-20 PROCEDURE — 99443: CPT

## 2023-11-20 RX ORDER — FUROSEMIDE 20 MG/1
20 TABLET ORAL
Qty: 180 | Refills: 3 | Status: DISCONTINUED | COMMUNITY
Start: 2022-12-20 | End: 2023-11-20

## 2023-11-20 RX ORDER — APIXABAN 5 MG/1
5 TABLET, FILM COATED ORAL
Qty: 180 | Refills: 3 | Status: ACTIVE | COMMUNITY
Start: 1900-01-01 | End: 1900-01-01

## 2023-11-20 RX ORDER — INSULIN ASPART 100 [IU]/ML
100 INJECTION, SOLUTION INTRAVENOUS; SUBCUTANEOUS
Qty: 3 | Refills: 3 | Status: ACTIVE | COMMUNITY
Start: 2022-11-07 | End: 1900-01-01

## 2023-11-20 RX ORDER — ROSUVASTATIN CALCIUM 20 MG/1
20 TABLET, FILM COATED ORAL
Qty: 90 | Refills: 3 | Status: ACTIVE | COMMUNITY
Start: 2019-09-27 | End: 1900-01-01

## 2023-11-20 RX ORDER — INSULIN GLARGINE 300 U/ML
300 INJECTION, SOLUTION SUBCUTANEOUS
Qty: 9 | Refills: 3 | Status: ACTIVE | COMMUNITY
Start: 2022-06-24 | End: 1900-01-01

## 2023-11-24 ENCOUNTER — INPATIENT (INPATIENT)
Facility: HOSPITAL | Age: 68
LOS: 6 days | Discharge: NURSING, REHAB OR LONG-TERM CARE | DRG: 394 | End: 2023-12-01
Attending: STUDENT IN AN ORGANIZED HEALTH CARE EDUCATION/TRAINING PROGRAM | Admitting: STUDENT IN AN ORGANIZED HEALTH CARE EDUCATION/TRAINING PROGRAM
Payer: MEDICARE

## 2023-11-24 VITALS
OXYGEN SATURATION: 96 % | SYSTOLIC BLOOD PRESSURE: 120 MMHG | DIASTOLIC BLOOD PRESSURE: 70 MMHG | HEART RATE: 78 BPM | RESPIRATION RATE: 20 BRPM | HEIGHT: 65 IN | WEIGHT: 293 LBS | TEMPERATURE: 99 F

## 2023-11-24 DIAGNOSIS — Z98.890 OTHER SPECIFIED POSTPROCEDURAL STATES: Chronic | ICD-10-CM

## 2023-11-24 DIAGNOSIS — Z90.710 ACQUIRED ABSENCE OF BOTH CERVIX AND UTERUS: Chronic | ICD-10-CM

## 2023-11-24 DIAGNOSIS — Z90.49 ACQUIRED ABSENCE OF OTHER SPECIFIED PARTS OF DIGESTIVE TRACT: Chronic | ICD-10-CM

## 2023-11-24 LAB
ALBUMIN SERPL ELPH-MCNC: 3.8 G/DL — SIGNIFICANT CHANGE UP (ref 3.3–5)
ALBUMIN SERPL ELPH-MCNC: 3.8 G/DL — SIGNIFICANT CHANGE UP (ref 3.3–5)
ALP SERPL-CCNC: 173 U/L — HIGH (ref 40–120)
ALP SERPL-CCNC: 173 U/L — HIGH (ref 40–120)
ALT FLD-CCNC: 22 U/L — SIGNIFICANT CHANGE UP (ref 10–45)
ALT FLD-CCNC: 22 U/L — SIGNIFICANT CHANGE UP (ref 10–45)
ANION GAP SERPL CALC-SCNC: 13 MMOL/L — SIGNIFICANT CHANGE UP (ref 5–17)
ANION GAP SERPL CALC-SCNC: 13 MMOL/L — SIGNIFICANT CHANGE UP (ref 5–17)
ANION GAP SERPL CALC-SCNC: 14 MMOL/L — SIGNIFICANT CHANGE UP (ref 5–17)
ANION GAP SERPL CALC-SCNC: 14 MMOL/L — SIGNIFICANT CHANGE UP (ref 5–17)
APPEARANCE UR: CLEAR — SIGNIFICANT CHANGE UP
APPEARANCE UR: CLEAR — SIGNIFICANT CHANGE UP
APTT BLD: 32.4 SEC — SIGNIFICANT CHANGE UP (ref 24.5–35.6)
APTT BLD: 32.4 SEC — SIGNIFICANT CHANGE UP (ref 24.5–35.6)
AST SERPL-CCNC: 24 U/L — SIGNIFICANT CHANGE UP (ref 10–40)
AST SERPL-CCNC: 24 U/L — SIGNIFICANT CHANGE UP (ref 10–40)
B-OH-BUTYR SERPL-SCNC: 0.1 MMOL/L — SIGNIFICANT CHANGE UP
B-OH-BUTYR SERPL-SCNC: 0.1 MMOL/L — SIGNIFICANT CHANGE UP
BACTERIA # UR AUTO: NEGATIVE /HPF — SIGNIFICANT CHANGE UP
BACTERIA # UR AUTO: NEGATIVE /HPF — SIGNIFICANT CHANGE UP
BASE EXCESS BLDV CALC-SCNC: 3.8 MMOL/L — HIGH (ref -2–3)
BASE EXCESS BLDV CALC-SCNC: 3.8 MMOL/L — HIGH (ref -2–3)
BASOPHILS # BLD AUTO: 0.12 K/UL — SIGNIFICANT CHANGE UP (ref 0–0.2)
BASOPHILS # BLD AUTO: 0.12 K/UL — SIGNIFICANT CHANGE UP (ref 0–0.2)
BASOPHILS NFR BLD AUTO: 1.1 % — SIGNIFICANT CHANGE UP (ref 0–2)
BASOPHILS NFR BLD AUTO: 1.1 % — SIGNIFICANT CHANGE UP (ref 0–2)
BILIRUB SERPL-MCNC: 0.6 MG/DL — SIGNIFICANT CHANGE UP (ref 0.2–1.2)
BILIRUB SERPL-MCNC: 0.6 MG/DL — SIGNIFICANT CHANGE UP (ref 0.2–1.2)
BILIRUB UR-MCNC: NEGATIVE — SIGNIFICANT CHANGE UP
BILIRUB UR-MCNC: NEGATIVE — SIGNIFICANT CHANGE UP
BUN SERPL-MCNC: 42 MG/DL — HIGH (ref 7–23)
BUN SERPL-MCNC: 42 MG/DL — HIGH (ref 7–23)
BUN SERPL-MCNC: 43 MG/DL — HIGH (ref 7–23)
BUN SERPL-MCNC: 43 MG/DL — HIGH (ref 7–23)
CA-I SERPL-SCNC: 1.21 MMOL/L — SIGNIFICANT CHANGE UP (ref 1.15–1.33)
CA-I SERPL-SCNC: 1.21 MMOL/L — SIGNIFICANT CHANGE UP (ref 1.15–1.33)
CALCIUM SERPL-MCNC: 10.1 MG/DL — SIGNIFICANT CHANGE UP (ref 8.4–10.5)
CALCIUM SERPL-MCNC: 10.1 MG/DL — SIGNIFICANT CHANGE UP (ref 8.4–10.5)
CALCIUM SERPL-MCNC: 9.9 MG/DL — SIGNIFICANT CHANGE UP (ref 8.4–10.5)
CALCIUM SERPL-MCNC: 9.9 MG/DL — SIGNIFICANT CHANGE UP (ref 8.4–10.5)
CAST: 0 /LPF — SIGNIFICANT CHANGE UP (ref 0–4)
CAST: 0 /LPF — SIGNIFICANT CHANGE UP (ref 0–4)
CHLORIDE BLDV-SCNC: 103 MMOL/L — SIGNIFICANT CHANGE UP (ref 96–108)
CHLORIDE BLDV-SCNC: 103 MMOL/L — SIGNIFICANT CHANGE UP (ref 96–108)
CHLORIDE SERPL-SCNC: 101 MMOL/L — SIGNIFICANT CHANGE UP (ref 96–108)
CHLORIDE SERPL-SCNC: 101 MMOL/L — SIGNIFICANT CHANGE UP (ref 96–108)
CHLORIDE SERPL-SCNC: 102 MMOL/L — SIGNIFICANT CHANGE UP (ref 96–108)
CHLORIDE SERPL-SCNC: 102 MMOL/L — SIGNIFICANT CHANGE UP (ref 96–108)
CO2 BLDV-SCNC: 33 MMOL/L — HIGH (ref 22–26)
CO2 BLDV-SCNC: 33 MMOL/L — HIGH (ref 22–26)
CO2 SERPL-SCNC: 26 MMOL/L — SIGNIFICANT CHANGE UP (ref 22–31)
CO2 SERPL-SCNC: 26 MMOL/L — SIGNIFICANT CHANGE UP (ref 22–31)
CO2 SERPL-SCNC: 28 MMOL/L — SIGNIFICANT CHANGE UP (ref 22–31)
CO2 SERPL-SCNC: 28 MMOL/L — SIGNIFICANT CHANGE UP (ref 22–31)
COLOR SPEC: YELLOW — SIGNIFICANT CHANGE UP
COLOR SPEC: YELLOW — SIGNIFICANT CHANGE UP
CREAT SERPL-MCNC: 1.91 MG/DL — HIGH (ref 0.5–1.3)
CREAT SERPL-MCNC: 1.91 MG/DL — HIGH (ref 0.5–1.3)
CREAT SERPL-MCNC: 1.94 MG/DL — HIGH (ref 0.5–1.3)
CREAT SERPL-MCNC: 1.94 MG/DL — HIGH (ref 0.5–1.3)
DIFF PNL FLD: NEGATIVE — SIGNIFICANT CHANGE UP
DIFF PNL FLD: NEGATIVE — SIGNIFICANT CHANGE UP
EGFR: 28 ML/MIN/1.73M2 — LOW
EOSINOPHIL # BLD AUTO: 0.3 K/UL — SIGNIFICANT CHANGE UP (ref 0–0.5)
EOSINOPHIL # BLD AUTO: 0.3 K/UL — SIGNIFICANT CHANGE UP (ref 0–0.5)
EOSINOPHIL NFR BLD AUTO: 2.8 % — SIGNIFICANT CHANGE UP (ref 0–6)
EOSINOPHIL NFR BLD AUTO: 2.8 % — SIGNIFICANT CHANGE UP (ref 0–6)
GAS PNL BLDV: 134 MMOL/L — LOW (ref 136–145)
GAS PNL BLDV: 134 MMOL/L — LOW (ref 136–145)
GAS PNL BLDV: SIGNIFICANT CHANGE UP
GAS PNL BLDV: SIGNIFICANT CHANGE UP
GLUCOSE BLDV-MCNC: 141 MG/DL — HIGH (ref 70–99)
GLUCOSE BLDV-MCNC: 141 MG/DL — HIGH (ref 70–99)
GLUCOSE SERPL-MCNC: 138 MG/DL — HIGH (ref 70–99)
GLUCOSE SERPL-MCNC: 138 MG/DL — HIGH (ref 70–99)
GLUCOSE SERPL-MCNC: 144 MG/DL — HIGH (ref 70–99)
GLUCOSE SERPL-MCNC: 144 MG/DL — HIGH (ref 70–99)
GLUCOSE UR QL: NEGATIVE MG/DL — SIGNIFICANT CHANGE UP
GLUCOSE UR QL: NEGATIVE MG/DL — SIGNIFICANT CHANGE UP
HCO3 BLDV-SCNC: 31 MMOL/L — HIGH (ref 22–29)
HCO3 BLDV-SCNC: 31 MMOL/L — HIGH (ref 22–29)
HCT VFR BLD CALC: 38.6 % — SIGNIFICANT CHANGE UP (ref 34.5–45)
HCT VFR BLD CALC: 38.6 % — SIGNIFICANT CHANGE UP (ref 34.5–45)
HCT VFR BLDA CALC: 37 % — SIGNIFICANT CHANGE UP (ref 34.5–46.5)
HCT VFR BLDA CALC: 37 % — SIGNIFICANT CHANGE UP (ref 34.5–46.5)
HGB BLD CALC-MCNC: 12.3 G/DL — SIGNIFICANT CHANGE UP (ref 11.7–16.1)
HGB BLD CALC-MCNC: 12.3 G/DL — SIGNIFICANT CHANGE UP (ref 11.7–16.1)
HGB BLD-MCNC: 11.7 G/DL — SIGNIFICANT CHANGE UP (ref 11.5–15.5)
HGB BLD-MCNC: 11.7 G/DL — SIGNIFICANT CHANGE UP (ref 11.5–15.5)
IMM GRANULOCYTES NFR BLD AUTO: 2.2 % — HIGH (ref 0–0.9)
IMM GRANULOCYTES NFR BLD AUTO: 2.2 % — HIGH (ref 0–0.9)
INR BLD: 1.63 RATIO — HIGH (ref 0.85–1.18)
INR BLD: 1.63 RATIO — HIGH (ref 0.85–1.18)
KETONES UR-MCNC: NEGATIVE MG/DL — SIGNIFICANT CHANGE UP
KETONES UR-MCNC: NEGATIVE MG/DL — SIGNIFICANT CHANGE UP
LACTATE BLDV-MCNC: 1.6 MMOL/L — SIGNIFICANT CHANGE UP (ref 0.5–2)
LACTATE BLDV-MCNC: 1.6 MMOL/L — SIGNIFICANT CHANGE UP (ref 0.5–2)
LEUKOCYTE ESTERASE UR-ACNC: ABNORMAL
LEUKOCYTE ESTERASE UR-ACNC: ABNORMAL
LYMPHOCYTES # BLD AUTO: 1.66 K/UL — SIGNIFICANT CHANGE UP (ref 1–3.3)
LYMPHOCYTES # BLD AUTO: 1.66 K/UL — SIGNIFICANT CHANGE UP (ref 1–3.3)
LYMPHOCYTES # BLD AUTO: 15.2 % — SIGNIFICANT CHANGE UP (ref 13–44)
LYMPHOCYTES # BLD AUTO: 15.2 % — SIGNIFICANT CHANGE UP (ref 13–44)
MCHC RBC-ENTMCNC: 28.5 PG — SIGNIFICANT CHANGE UP (ref 27–34)
MCHC RBC-ENTMCNC: 28.5 PG — SIGNIFICANT CHANGE UP (ref 27–34)
MCHC RBC-ENTMCNC: 30.3 GM/DL — LOW (ref 32–36)
MCHC RBC-ENTMCNC: 30.3 GM/DL — LOW (ref 32–36)
MCV RBC AUTO: 94.1 FL — SIGNIFICANT CHANGE UP (ref 80–100)
MCV RBC AUTO: 94.1 FL — SIGNIFICANT CHANGE UP (ref 80–100)
MONOCYTES # BLD AUTO: 0.85 K/UL — SIGNIFICANT CHANGE UP (ref 0–0.9)
MONOCYTES # BLD AUTO: 0.85 K/UL — SIGNIFICANT CHANGE UP (ref 0–0.9)
MONOCYTES NFR BLD AUTO: 7.8 % — SIGNIFICANT CHANGE UP (ref 2–14)
MONOCYTES NFR BLD AUTO: 7.8 % — SIGNIFICANT CHANGE UP (ref 2–14)
NEUTROPHILS # BLD AUTO: 7.72 K/UL — HIGH (ref 1.8–7.4)
NEUTROPHILS # BLD AUTO: 7.72 K/UL — HIGH (ref 1.8–7.4)
NEUTROPHILS NFR BLD AUTO: 70.9 % — SIGNIFICANT CHANGE UP (ref 43–77)
NEUTROPHILS NFR BLD AUTO: 70.9 % — SIGNIFICANT CHANGE UP (ref 43–77)
NITRITE UR-MCNC: NEGATIVE — SIGNIFICANT CHANGE UP
NITRITE UR-MCNC: NEGATIVE — SIGNIFICANT CHANGE UP
NRBC # BLD: 0 /100 WBCS — SIGNIFICANT CHANGE UP (ref 0–0)
NRBC # BLD: 0 /100 WBCS — SIGNIFICANT CHANGE UP (ref 0–0)
PCO2 BLDV: 61 MMHG — HIGH (ref 39–42)
PCO2 BLDV: 61 MMHG — HIGH (ref 39–42)
PH BLDV: 7.32 — SIGNIFICANT CHANGE UP (ref 7.32–7.43)
PH BLDV: 7.32 — SIGNIFICANT CHANGE UP (ref 7.32–7.43)
PH UR: 5.5 — SIGNIFICANT CHANGE UP (ref 5–8)
PH UR: 5.5 — SIGNIFICANT CHANGE UP (ref 5–8)
PLATELET # BLD AUTO: 243 K/UL — SIGNIFICANT CHANGE UP (ref 150–400)
PLATELET # BLD AUTO: 243 K/UL — SIGNIFICANT CHANGE UP (ref 150–400)
PO2 BLDV: 70 MMHG — HIGH (ref 25–45)
PO2 BLDV: 70 MMHG — HIGH (ref 25–45)
POTASSIUM BLDV-SCNC: 7.3 MMOL/L — CRITICAL HIGH (ref 3.5–5.1)
POTASSIUM BLDV-SCNC: 7.3 MMOL/L — CRITICAL HIGH (ref 3.5–5.1)
POTASSIUM SERPL-MCNC: 5.3 MMOL/L — SIGNIFICANT CHANGE UP (ref 3.5–5.3)
POTASSIUM SERPL-MCNC: 5.3 MMOL/L — SIGNIFICANT CHANGE UP (ref 3.5–5.3)
POTASSIUM SERPL-MCNC: 5.5 MMOL/L — HIGH (ref 3.5–5.3)
POTASSIUM SERPL-MCNC: 5.5 MMOL/L — HIGH (ref 3.5–5.3)
POTASSIUM SERPL-SCNC: 5.3 MMOL/L — SIGNIFICANT CHANGE UP (ref 3.5–5.3)
POTASSIUM SERPL-SCNC: 5.3 MMOL/L — SIGNIFICANT CHANGE UP (ref 3.5–5.3)
POTASSIUM SERPL-SCNC: 5.5 MMOL/L — HIGH (ref 3.5–5.3)
POTASSIUM SERPL-SCNC: 5.5 MMOL/L — HIGH (ref 3.5–5.3)
PROT SERPL-MCNC: 7 G/DL — SIGNIFICANT CHANGE UP (ref 6–8.3)
PROT SERPL-MCNC: 7 G/DL — SIGNIFICANT CHANGE UP (ref 6–8.3)
PROT UR-MCNC: NEGATIVE MG/DL — SIGNIFICANT CHANGE UP
PROT UR-MCNC: NEGATIVE MG/DL — SIGNIFICANT CHANGE UP
PROTHROM AB SERPL-ACNC: 16.9 SEC — HIGH (ref 9.5–13)
PROTHROM AB SERPL-ACNC: 16.9 SEC — HIGH (ref 9.5–13)
RBC # BLD: 4.1 M/UL — SIGNIFICANT CHANGE UP (ref 3.8–5.2)
RBC # BLD: 4.1 M/UL — SIGNIFICANT CHANGE UP (ref 3.8–5.2)
RBC # FLD: 15.7 % — HIGH (ref 10.3–14.5)
RBC # FLD: 15.7 % — HIGH (ref 10.3–14.5)
RBC CASTS # UR COMP ASSIST: 1 /HPF — SIGNIFICANT CHANGE UP (ref 0–4)
RBC CASTS # UR COMP ASSIST: 1 /HPF — SIGNIFICANT CHANGE UP (ref 0–4)
SAO2 % BLDV: 91.1 % — HIGH (ref 67–88)
SAO2 % BLDV: 91.1 % — HIGH (ref 67–88)
SODIUM SERPL-SCNC: 141 MMOL/L — SIGNIFICANT CHANGE UP (ref 135–145)
SODIUM SERPL-SCNC: 141 MMOL/L — SIGNIFICANT CHANGE UP (ref 135–145)
SODIUM SERPL-SCNC: 143 MMOL/L — SIGNIFICANT CHANGE UP (ref 135–145)
SODIUM SERPL-SCNC: 143 MMOL/L — SIGNIFICANT CHANGE UP (ref 135–145)
SP GR SPEC: 1.02 — SIGNIFICANT CHANGE UP (ref 1–1.03)
SP GR SPEC: 1.02 — SIGNIFICANT CHANGE UP (ref 1–1.03)
SQUAMOUS # UR AUTO: 1 /HPF — SIGNIFICANT CHANGE UP (ref 0–5)
SQUAMOUS # UR AUTO: 1 /HPF — SIGNIFICANT CHANGE UP (ref 0–5)
UROBILINOGEN FLD QL: 1 MG/DL — SIGNIFICANT CHANGE UP (ref 0.2–1)
UROBILINOGEN FLD QL: 1 MG/DL — SIGNIFICANT CHANGE UP (ref 0.2–1)
WBC # BLD: 10.89 K/UL — HIGH (ref 3.8–10.5)
WBC # BLD: 10.89 K/UL — HIGH (ref 3.8–10.5)
WBC # FLD AUTO: 10.89 K/UL — HIGH (ref 3.8–10.5)
WBC # FLD AUTO: 10.89 K/UL — HIGH (ref 3.8–10.5)
WBC UR QL: 1 /HPF — SIGNIFICANT CHANGE UP (ref 0–5)
WBC UR QL: 1 /HPF — SIGNIFICANT CHANGE UP (ref 0–5)

## 2023-11-24 PROCEDURE — 99285 EMERGENCY DEPT VISIT HI MDM: CPT

## 2023-11-24 PROCEDURE — 99222 1ST HOSP IP/OBS MODERATE 55: CPT

## 2023-11-24 PROCEDURE — 74176 CT ABD & PELVIS W/O CONTRAST: CPT | Mod: 26,MA

## 2023-11-24 RX ORDER — SODIUM CHLORIDE 9 MG/ML
1000 INJECTION INTRAMUSCULAR; INTRAVENOUS; SUBCUTANEOUS ONCE
Refills: 0 | Status: COMPLETED | OUTPATIENT
Start: 2023-11-24 | End: 2023-11-24

## 2023-11-24 RX ORDER — MORPHINE SULFATE 50 MG/1
4 CAPSULE, EXTENDED RELEASE ORAL ONCE
Refills: 0 | Status: DISCONTINUED | OUTPATIENT
Start: 2023-11-24 | End: 2023-11-24

## 2023-11-24 RX ORDER — LIDOCAINE 4 G/100G
1 CREAM TOPICAL ONCE
Refills: 0 | Status: COMPLETED | OUTPATIENT
Start: 2023-11-24 | End: 2023-11-24

## 2023-11-24 RX ADMIN — LIDOCAINE 1 APPLICATION(S): 4 CREAM TOPICAL at 23:23

## 2023-11-24 RX ADMIN — MORPHINE SULFATE 4 MILLIGRAM(S): 50 CAPSULE, EXTENDED RELEASE ORAL at 14:09

## 2023-11-24 RX ADMIN — MORPHINE SULFATE 4 MILLIGRAM(S): 50 CAPSULE, EXTENDED RELEASE ORAL at 19:14

## 2023-11-24 RX ADMIN — SODIUM CHLORIDE 1000 MILLILITER(S): 9 INJECTION INTRAMUSCULAR; INTRAVENOUS; SUBCUTANEOUS at 10:31

## 2023-11-24 RX ADMIN — MORPHINE SULFATE 4 MILLIGRAM(S): 50 CAPSULE, EXTENDED RELEASE ORAL at 10:31

## 2023-11-24 NOTE — ED ADULT NURSE REASSESSMENT NOTE - NS ED NURSE REASSESS COMMENT FT1
Pt resting in bed, VSS, c/o rectal pain. MD Rangel notified, awaiting pain meds. Pt updated on POC, awaiting official CT results.

## 2023-11-24 NOTE — CONSULT NOTE ADULT - SUBJECTIVE AND OBJECTIVE BOX
SURGERY CONSULT NOTE  --------------------------------------------------------------------------------------------    Patient is a 68y old  Female who presents with a chief complaint of     HPI: ***      PAST MEDICAL & SURGICAL HISTORY:  Diabetes Mellitus Type II      HTN (Hypertension)      Endometrial Hyperplasia      Cervical Stenosis of Spine      Spinal Stenosis, Lumbar      Deep Vein Thrombosis (DVT)  Left leg, , treated and resolved      Dyslipidemia      Cataract      Morbid Obesity      Vitamin D deficiency      Insomnia      CKD (chronic kidney disease)  ~ III      Congestive heart failure  ~ HFpEF      Uterine cancer      Asthma      On home oxygen therapy      Gait difficulty  ~ u ses walker      Cataract extracted with lens implant   Right      C Section       Cholecystectomy/appendectomy @ age 25      D&C x2       D&C   hysteroscopy, endometrial hyperplasia,       Cervical Spinal Stenosis surgery x2 (2002, 2002)      Tonsillectomy as a child      Endometrial biopsy 09      H/O laser iridotomy  left eye,       H/O colonoscopy        S/P total abdominal hysterectomy and bilateral salpingo-oophorectomy      S/P appendectomy      S/P cholecystectomy        FAMILY HISTORY:  Family history of pancreatic cancer    Family history of bladder cancer    Family history of lung cancer (Grandparent)      [] Family history not pertinent as reviewed with the patient and family    SOCIAL HISTORY:  ***    ALLERGIES: adhesives (Rash)  latex (Rash)  Bactrim (Flushing)  wool- rash, itch (Other)      HOME MEDICATIONS:  ***    CURRENT MEDICATIONS  MEDICATIONS (STANDING):   MEDICATIONS (PRN):  --------------------------------------------------------------------------------------------    Vitals:   T(C): 36.8 (23 @ 18:03), Max: 37 (23 @ 08:59)  HR: 71 (23 @ 18:03) (69 - 78)  BP: 118/56 (23 @ 18:03) (93/55 - 120/70)  RR: 18 (23 @ 18:03) (18 - 20)  SpO2: 99% (23 @ 18:03) (96% - 99%)  CAPILLARY BLOOD GLUCOSE      POCT Blood Glucose.: 131 mg/dL (2023 13:00)    CAPILLARY BLOOD GLUCOSE      POCT Blood Glucose.: 131 mg/dL (2023 13:00)      Height (cm): 165.1 (:59)  Weight (kg): 167.784 (:59)  BMI (kg/m2): 61.6 (:)  BSA (m2): 2.57 (:59)    PHYSICAL EXAM: ***  General: Alert, NAD  Neuro: A+Ox3  HEENT: NC/AT, no asymmetry, no scleral icterus  Neck: Soft, supple  Cardio: RRR, nml S1/S2  Resp: Airway patent, unlabored breathing  Thorax: No chest wall tenderness  GI/Abd: Soft, NT/ND, no rebound/guarding, no masses palpated  Vascular: All 4 extremities warm, B/l radial pulses palpable, b/l femoral pulse palpable,  b/l DP/PT palpable  Skin: Intact, no breakdown  Musculoskeletal: All 4 extremities moving spontaneously, no limitations  --------------------------------------------------------------------------------------------    LABS  CBC ( @ 09:48)                              11.7                           10.89<H>  )----------------(  243        70.9  % Neutrophils, 15.2  % Lymphocytes, ANC: 7.72<H>                              38.6      BMP ( @ 12:17)             143     |  102     |  43<H> 		Ca++ --      Ca 9.9                ---------------------------------( 138<H>		Mg --                 5.3     |  28      |  1.94<H>			Ph --      BMP ( @ 09:48)             141     |  101     |  42<H> 		Ca++ --      Ca 10.1               ---------------------------------( 144<H>		Mg --                 5.5<H>  |  26      |  1.91<H>			Ph --        LFTs ( @ 09:48)      TPro 7.0 / Alb 3.8 / TBili 0.6 / DBili -- / AST 24 / ALT 22 / AlkPhos 173<H>    Coags ( @ 09:48)  aPTT 32.4 / INR 1.63<H> / PT 16.9<H>        VBG ( @ 09:20)     7.32 / 61<H> / 70<H> / 31<H> / 3.8<H> / 91.1<H>%     Lactate: 1.6    --------------------------------------------------------------------------------------------    MICROBIOLOGY  Urinalysis ( @ 14:48):     Color: Yellow / Appearance: Clear / S.019 / pH: 5.5 / Gluc: Negative / Ketones: Negative / Bili: Negative / Urobili: 1.0 / Protein :Negative / Nitrites: Negative / Leuk.Est: Trace<!> / RBC: 1 / WBC: 1 / Sq Epi:  / Non Sq Epi: 1 / Bacteria Negative         --------------------------------------------------------------------------------------------    IMAGING   COLORECTAL SURGERY CONSULT NOTE  --------------------------------------------------------------------------------------------    Patient is a 68y old  Female who presents with a chief complaint of rectal pain     HPI: 68F PMHx morbid obesity, HTN, HLD, DM2, HFpEF, CKD III, DVT, Uterine CA s/p MEGAN/BSO @ [facility], who presents to [facility] ED w rectal pain. CRS consulted to evaluate.     Pt is HDS, AF. Labs w hyperK, elevated creatinine. CT A/P w/o acute pathology, limited by habitus.     Seen and examined at bedside. Reports onset of rectal pain beginning of this week, worse w BM. Denies hematochezia, melena, fevers, chills, nausea, vomiting, diarrhea, CP, SOB. Notably, pt previously seen by CRS on prior admission, determined to have anal fissure, referred for OP f/u with regimen of Sitz Bath TID/Bowel Regimen/Nifedipine cream. Pt reports that those interventions helped somewhat, but pain has returned.     PAST MEDICAL & SURGICAL HISTORY:  Diabetes Mellitus Type II      HTN (Hypertension)      Endometrial Hyperplasia      Cervical Stenosis of Spine      Spinal Stenosis, Lumbar      Deep Vein Thrombosis (DVT)  Left leg, , treated and resolved      Dyslipidemia      Cataract      Morbid Obesity      Vitamin D deficiency      Insomnia      CKD (chronic kidney disease)  ~ III      Congestive heart failure  ~ HFpEF      Uterine cancer      Asthma      On home oxygen therapy      Gait difficulty  ~ u ses walker      Cataract extracted with lens implant   Right      C Section       Cholecystectomy/appendectomy @ age 25      D&C x2       D&C 2008  hysteroscopy, endometrial hyperplasia, 2009      Cervical Spinal Stenosis surgery x2 (2002, 2002)      Tonsillectomy as a child      Endometrial biopsy 09      H/O laser iridotomy  left eye, 2016      H/O colonoscopy        S/P total abdominal hysterectomy and bilateral salpingo-oophorectomy      S/P appendectomy      S/P cholecystectomy        FAMILY HISTORY:  Family history of pancreatic cancer    Family history of bladder cancer    Family history of lung cancer (Grandparent)      [] Family history not pertinent as reviewed with the patient and family      ALLERGIES: adhesives (Rash)  latex (Rash)  Bactrim (Flushing)  wool- rash, itch (Other)    CURRENT MEDICATIONS  MEDICATIONS (STANDING):   MEDICATIONS (PRN):  --------------------------------------------------------------------------------------------    Vitals:   T(C): 36.8 (23 @ 18:03), Max: 37 (23 @ 08:59)  HR: 71 (23 @ 18:03) (69 - 78)  BP: 118/56 (23 @ 18:03) (93/55 - 120/70)  RR: 18 (23 @ 18:03) (18 - 20)  SpO2: 99% (23 @ 18:03) (96% - 99%)  CAPILLARY BLOOD GLUCOSE      POCT Blood Glucose.: 131 mg/dL (2023 13:00)    CAPILLARY BLOOD GLUCOSE      POCT Blood Glucose.: 131 mg/dL (2023 13:00)      Height (cm): 165.1 ( 08:59)  Weight (kg): 167.784 ( 08:59)  BMI (kg/m2): 61.6 ( 08:59)  BSA (m2): 2.57 ( 08:59)    PHYSICAL EXAM:   General: Alert, NAD, morbidly obese   Neuro: A+Ox3  HEENT: NC/AT, no asymmetry, no scleral icterus  Neck: Soft, supple  Cardio: RRR  Resp: Airway patent, unlabored breathing  Thorax: No chest wall tenderness  GI/Abd: Soft, NT/ND, no rebound/guarding, no masses palpated  Rectal: anal fissure, posterior midline, no palpable fluctuance or induration externally, no masses or blood on ELIE, ELIE TTP  Vascular: All 4 extremities warm   Skin: Intact, no breakdown  Musculoskeletal: All 4 extremities moving spontaneously, no limitations  --------------------------------------------------------------------------------------------    LABS  CBC (:48)                              11.7                           10.89<H>  )----------------(  243        70.9  % Neutrophils, 15.2  % Lymphocytes, ANC: 7.72<H>                              38.6      BMP ( @ 12:17)             143     |  102     |  43<H> 		Ca++ --      Ca 9.9                ---------------------------------( 138<H>		Mg --                 5.3     |  28      |  1.94<H>			Ph --      BMP (:48)             141     |  101     |  42<H> 		Ca++ --      Ca 10.1               ---------------------------------( 144<H>		Mg --                 5.5<H>  |  26      |  1.91<H>			Ph --        LFTs (:48)      TPro 7.0 / Alb 3.8 / TBili 0.6 / DBili -- / AST 24 / ALT 22 / AlkPhos 173<H>    Coags (:48)  aPTT 32.4 / INR 1.63<H> / PT 16.9<H>        VBG ( 09:20)     7.32 / 61<H> / 70<H> / 31<H> / 3.8<H> / 91.1<H>%     Lactate: 1.6    --------------------------------------------------------------------------------------------    MICROBIOLOGY  Urinalysis ( @ 14:48):     Color: Yellow / Appearance: Clear / S.019 / pH: 5.5 / Gluc: Negative / Ketones: Negative / Bili: Negative / Urobili: 1.0 / Protein :Negative / Nitrites: Negative / Leuk.Est: Trace<!> / RBC: 1 / WBC: 1 / Sq Epi:  / Non Sq Epi: 1 / Bacteria Negative         --------------------------------------------------------------------------------------------    IMAGING  < from: CT Abdomen and Pelvis No Cont (23 @ 17:20) >    ACC: [id number] EXAM:  CT ABDOMEN AND PELVIS   ORDERED BY:  MAMTA BELLO     PROCEDURE DATE:  2023          INTERPRETATION:  CLINICAL INFORMATION: Rectal pain, evaluate for abscess.    COMPARISON: CT abdomen pelvis 2023    CONTRAST/COMPLICATIONS:  IV Contrast: NONE  Oral Contrast: NONE  Complications: None reported at time of study completion    PROCEDURE:  CT of the Abdomen and Pelvis was performed.  Sagittal and coronal reformats were performed.    FINDINGS:  LOWER CHEST: Mitral annular calcifications.    LIVER: Within normal limits.  BILE DUCTS: Normal caliber.  GALLBLADDER: Cholecystectomy.  SPLEEN: Within normal limits.  PANCREAS: Fatty atrophy.  ADRENALS: Normal left adrenal gland. Right adrenal indeterminate nodule   measuring 2.8 x 1.8 cm, stable.  KIDNEYS/URETERS: Within normal limits.    BLADDER: Within normal limits.  REPRODUCTIVE ORGANS: Hysterectomy.    BOWEL: No bowel obstruction. Appendix has been removed. No perianal or   perirectal fluid collection.  PERITONEUM: No ascites.  VESSELS: Atherosclerotic changes.  RETROPERITONEUM/LYMPH NODES: No lymphadenopathy.  ABDOMINAL WALL: Coarse calcifications in the subcutaneous fat of the   bilateral gluteal regions.  BONES: Degenerative changes.    IMPRESSION:    Limited by patient body habitus resulting in the right anterolateral   aspect of the abdomen out of field of view.    No perirectal or perianal abscess.    No acute abnormality identified.        --- End of Report ---          COBY LICEA MD; Resident Radiologist  This document has been electronically signed.  ROB ARZOLA MD; Attending Radiologist  This document has been electronically signed. 2023  6:09PM    < end of copied text >

## 2023-11-24 NOTE — ED PROVIDER NOTE - CLINICAL SUMMARY MEDICAL DECISION MAKING FREE TEXT BOX
68-year-old morbidly obese female, with multiple medical issues, recent admission to the hospital in October for   rectal fissures and rectal pain, last CAT scan of the abdomen and pelvis in September no perirectal abscess,   was evaluated by colorectal surgery and in September 2023, diagnosed with rectal fissure recommending sitz bath  Nifedipine ointment, laxative, patient presented today basically for the same complaints when she was admitted in October, rectal pain after the defecation, no fever no chills no blood in the stool,   will obtain basic labs, CAT scan of the abdomen and pelvis, to rule out perirectal abscess, colorectal consultation, pain meds  and reassess ZR

## 2023-11-24 NOTE — ED PROVIDER NOTE - NSFOLLOWUPINSTRUCTIONS_ED_ALL_ED_FT
You are seen in the emergency department for pain related to your anal fissure.  You were evaluated by the colorectal surgery team who did not see a need for surgical intervention and would like to see you in the outpatient office as soon as possible.  It is recommended that you do a sitz bath 3 times a day, use MiraLAX twice a day, use lidocaine cream after each bowel movement, and use nifedipine cream as directed by the colorectal surgeons. Use tylenol as needed for pain. You should follow-up as quickly as possible with Dr. Turner    Please return to the emergency department if your symptoms persist or worsen    Follow-up with your primary care physician.

## 2023-11-24 NOTE — ED ADULT NURSE REASSESSMENT NOTE - NS ED NURSE REASSESS COMMENT FT1
Straight catheterization attempted x2 from RN w/ 3 RNs present for sterility and mobility purposes. Unsuccessful w/ both attempts due to visionary field obstruction. MD Rangel notified, states will call urology. No other complaints at this time, safety maintained.

## 2023-11-24 NOTE — ED PROVIDER NOTE - PROGRESS NOTE DETAILS
Oscar Rangel MD PGY2: Labs sig for gfr <30, no leukocytosis. Otherwise unactionable. CT with no abscess, limited by noncon for gfr. Consulted surgery, will see pt bedside. windy- Discussed results of CT scan with patient.  No acute surgical intervention discussed recommendations from colorectal surgery.  Patient stating that she does not feel safe to go home.  States that she was recently discharged from a physical therapy rehab center and feels that she was discharged too early.  Since discharge approximately 2 weeks ago has had difficulty ambulating around her house, feeding herself, using the restroom.  is requesting additional help at home which she does not have.  States that she has a nurse 1 time a week which is not sufficient.  Discussed with patient's son who agrees that patient is not safe for discharge due to difficulty ambulating and performing ADLs.  Will admit patient

## 2023-11-24 NOTE — CONSULT NOTE ADULT - ASSESSMENT
68F PMHx morbid obesity, HTN, HLD, DM2, HFpEF, CKD III, DVT, Uterine CA s/p MEGAN/BSO @ [facility], who presents to [facility] ED w rectal pain. CRS consulted to evaluate.     Exam consistent w anal fissure. Pt has hx of anal fissure, previously evaluated by CRS, referred for OP f/u with regimen of Sitz Bath TID/Bowel Regimen/Nifedipine cream.     Plan:   -No acute surgical intervention   -SITZ Bath TID  -Colace/Miralax BID   -Lidocaine cream after every BM   -Nifedipine cream   -OP f/u Dr. Holman  -Dispo per ED     Discussed with Fellow on behalf of Attending Surgeon Dr. Dominique MD PGY3  Red, [id number]   68F PMHx morbid obesity, HTN, HLD, DM2, HFpEF, CKD III, DVT, Uterine CA s/p MEGAN/BSO @ [facility], who presents to [facility] ED w rectal pain. CRS consulted to evaluate.     Exam consistent w anal fissure. Pt has hx of anal fissure, previously evaluated by CRS, referred for OP f/u with regimen of Sitz Bath TID/Bowel Regimen/Nifedipine cream.     Plan:   -No acute surgical intervention   -SITZ Bath TID  -increased fiber intake w metamucil  -Lidocaine cream after every BM   -Nifedipine cream   -OP f/u Dr. Holman  -Dispo per ED     Discussed with Fellow on behalf of Attending Surgeon Dr. Dominique MD PGY3  Red, [id number]

## 2023-11-24 NOTE — ED ADULT NURSE NOTE - CHPI ED NUR SYMPTOMS NEG
no blood in stool/no burning urination/no diarrhea/no dysuria/no fever/no hematuria/no nausea/no vomiting

## 2023-11-24 NOTE — ED ADULT NURSE REASSESSMENT NOTE - NS ED NURSE REASSESS COMMENT FT1
Patient resting comfortably, breathing unlabored, VSS, no signs of acute distress, side rails raised, call bell within reach, comfort and safety maintained. Awaiting CT scan, delay due to unsureness if pt can fit through CT scanner due to weight. Pt states has had CT in ER before so will attempt. Pt updated on POC.

## 2023-11-24 NOTE — ED ADULT NURSE NOTE - OBJECTIVE STATEMENT
68 y.o F BIB EMS p/w c/o rectal pain. A+Ox4. Pt states over past couple of days has been having constipation leading to straining w/ defecation. States has been taking stool softener w/ no relief. Reports worsening of straining over past x2 days and states "tearing" sensation at the rectum w/ bowel movements, rated 10/10 on pain scale. PMH anal huggins, HLD, HTN, DM, cellulitis, Obesity, asthma, PE. On eliquis. Upon initial assessment, Lower half of abdomen red, swollen, w/ fluid filled blisters scattered around abdomen. B/L LE redness and edema also noted w/ wound located on R ankle. Decrease pedal pulses. Rectum red and swollen. On 4L NC at home as baseline. Denies CP/ SOB, fever, urinary sx. Endorsing chills. No other complaints at this time, saftey maintained.

## 2023-11-24 NOTE — ED PROVIDER NOTE - OBJECTIVE STATEMENT
Patient is a 68-year-old female past medical history is significant for hypertension, hyperlipidemia, diabetes type 2, CHF, CKD 3, DVT on Eliquis, suspected PE, uterine cancer presenting for rectal pain.  Patient states that for the past 1 day she has been having severe rectal pain especially with defecation, similar in the past prior rectal pain that she has been told is been associated with fissures, has followed with colorectal here with Dr. Holman, and and recommended stool softeners and topical nifedipine.  No new chest pain, shortness of breath has been using her 4 L nasal cannula at baseline at home.  Espouses some skin excoriation to her R shin but otherwise without any new leg swelling or leg pain.  No fevers, chills, cough, sore throat

## 2023-11-24 NOTE — ED PROVIDER NOTE - PHYSICAL EXAMINATION
CONSTITUTIONAL: Awake; high BMI, in no acute distress.   HEAD: Normocephalic; atraumatic.  EYES: no conjunctival injection. no scleral icterus  ENT: No nasal discharge; airway clear.  NECK: Supple; non tender.  CARD: S1, S2 normal; no murmurs, gallops, or rubs. Regular rate and rhythm.   RESP: No wheezes, rales or rhonchi. Good air movement bilaterally.   ABD: soft ntnd, no guarding, no distention, no rigidity.   Rectal: chaperone Pogor, anterior anal fissure nonbleeding present, soft nonbloody nonmelanotic stool present  EXT: BLE edema with overlying venous stasis changes  NEURO: Alert, oriented, grossly unremarkable  PSYCH: Cooperative, appropriate.

## 2023-11-24 NOTE — ED PROVIDER NOTE - PATIENT PORTAL LINK FT
You can access the FollowMyHealth Patient Portal offered by [facility] by registering at the following website: [email or web address]. By joining [facility]’s FollowMyHealth portal, you will also be able to view your health information using other applications (apps) compatible with our system.

## 2023-11-24 NOTE — ED ADULT NURSE NOTE - NSFALLRISKINTERV_ED_ALL_ED

## 2023-11-24 NOTE — ED PROVIDER NOTE - CARE PROVIDER_API CALL
Pramod Holman  Colon/Rectal Surgery  [address], [address]  [place] [address]  Phone: [phone]  Fax: [phone]  Follow Up Time: Urgent

## 2023-11-25 DIAGNOSIS — R09.89 OTHER SPECIFIED SYMPTOMS AND SIGNS INVOLVING THE CIRCULATORY AND RESPIRATORY SYSTEMS: ICD-10-CM

## 2023-11-25 DIAGNOSIS — I50.32 CHRONIC DIASTOLIC (CONGESTIVE) HEART FAILURE: ICD-10-CM

## 2023-11-25 DIAGNOSIS — N18.30 CHRONIC KIDNEY DISEASE, STAGE 3 UNSPECIFIED: ICD-10-CM

## 2023-11-25 DIAGNOSIS — E78.5 HYPERLIPIDEMIA, UNSPECIFIED: ICD-10-CM

## 2023-11-25 DIAGNOSIS — E66.01 MORBID (SEVERE) OBESITY DUE TO EXCESS CALORIES: ICD-10-CM

## 2023-11-25 DIAGNOSIS — Z29.9 ENCOUNTER FOR PROPHYLACTIC MEASURES, UNSPECIFIED: ICD-10-CM

## 2023-11-25 DIAGNOSIS — K60.0 ACUTE ANAL FISSURE: ICD-10-CM

## 2023-11-25 DIAGNOSIS — I10 ESSENTIAL (PRIMARY) HYPERTENSION: ICD-10-CM

## 2023-11-25 DIAGNOSIS — K60.2 ANAL FISSURE, UNSPECIFIED: ICD-10-CM

## 2023-11-25 DIAGNOSIS — E11.9 TYPE 2 DIABETES MELLITUS WITHOUT COMPLICATIONS: ICD-10-CM

## 2023-11-25 LAB
A1C WITH ESTIMATED AVERAGE GLUCOSE RESULT: 7.6 % — HIGH (ref 4–5.6)
A1C WITH ESTIMATED AVERAGE GLUCOSE RESULT: 7.6 % — HIGH (ref 4–5.6)
ALBUMIN SERPL ELPH-MCNC: 3.6 G/DL — SIGNIFICANT CHANGE UP (ref 3.3–5)
ALBUMIN SERPL ELPH-MCNC: 3.6 G/DL — SIGNIFICANT CHANGE UP (ref 3.3–5)
ALP SERPL-CCNC: 155 U/L — HIGH (ref 40–120)
ALP SERPL-CCNC: 155 U/L — HIGH (ref 40–120)
ALT FLD-CCNC: 19 U/L — SIGNIFICANT CHANGE UP (ref 10–45)
ALT FLD-CCNC: 19 U/L — SIGNIFICANT CHANGE UP (ref 10–45)
ANION GAP SERPL CALC-SCNC: 9 MMOL/L — SIGNIFICANT CHANGE UP (ref 5–17)
ANION GAP SERPL CALC-SCNC: 9 MMOL/L — SIGNIFICANT CHANGE UP (ref 5–17)
APTT BLD: 33.3 SEC — SIGNIFICANT CHANGE UP (ref 24.5–35.6)
APTT BLD: 33.3 SEC — SIGNIFICANT CHANGE UP (ref 24.5–35.6)
AST SERPL-CCNC: 20 U/L — SIGNIFICANT CHANGE UP (ref 10–40)
AST SERPL-CCNC: 20 U/L — SIGNIFICANT CHANGE UP (ref 10–40)
BASOPHILS # BLD AUTO: 0.08 K/UL — SIGNIFICANT CHANGE UP (ref 0–0.2)
BASOPHILS # BLD AUTO: 0.08 K/UL — SIGNIFICANT CHANGE UP (ref 0–0.2)
BASOPHILS NFR BLD AUTO: 1.1 % — SIGNIFICANT CHANGE UP (ref 0–2)
BASOPHILS NFR BLD AUTO: 1.1 % — SIGNIFICANT CHANGE UP (ref 0–2)
BILIRUB SERPL-MCNC: 0.7 MG/DL — SIGNIFICANT CHANGE UP (ref 0.2–1.2)
BILIRUB SERPL-MCNC: 0.7 MG/DL — SIGNIFICANT CHANGE UP (ref 0.2–1.2)
BUN SERPL-MCNC: 41 MG/DL — HIGH (ref 7–23)
BUN SERPL-MCNC: 41 MG/DL — HIGH (ref 7–23)
CALCIUM SERPL-MCNC: 9.6 MG/DL — SIGNIFICANT CHANGE UP (ref 8.4–10.5)
CALCIUM SERPL-MCNC: 9.6 MG/DL — SIGNIFICANT CHANGE UP (ref 8.4–10.5)
CHLORIDE SERPL-SCNC: 103 MMOL/L — SIGNIFICANT CHANGE UP (ref 96–108)
CHLORIDE SERPL-SCNC: 103 MMOL/L — SIGNIFICANT CHANGE UP (ref 96–108)
CO2 SERPL-SCNC: 27 MMOL/L — SIGNIFICANT CHANGE UP (ref 22–31)
CO2 SERPL-SCNC: 27 MMOL/L — SIGNIFICANT CHANGE UP (ref 22–31)
CREAT SERPL-MCNC: 2.09 MG/DL — HIGH (ref 0.5–1.3)
CREAT SERPL-MCNC: 2.09 MG/DL — HIGH (ref 0.5–1.3)
CULTURE RESULTS: SIGNIFICANT CHANGE UP
CULTURE RESULTS: SIGNIFICANT CHANGE UP
EGFR: 25 ML/MIN/1.73M2 — LOW
EGFR: 25 ML/MIN/1.73M2 — LOW
EOSINOPHIL # BLD AUTO: 0.25 K/UL — SIGNIFICANT CHANGE UP (ref 0–0.5)
EOSINOPHIL # BLD AUTO: 0.25 K/UL — SIGNIFICANT CHANGE UP (ref 0–0.5)
EOSINOPHIL NFR BLD AUTO: 3.3 % — SIGNIFICANT CHANGE UP (ref 0–6)
EOSINOPHIL NFR BLD AUTO: 3.3 % — SIGNIFICANT CHANGE UP (ref 0–6)
ESTIMATED AVERAGE GLUCOSE: 171 MG/DL — HIGH (ref 68–114)
ESTIMATED AVERAGE GLUCOSE: 171 MG/DL — HIGH (ref 68–114)
GLUCOSE BLDC GLUCOMTR-MCNC: 106 MG/DL — HIGH (ref 70–99)
GLUCOSE BLDC GLUCOMTR-MCNC: 106 MG/DL — HIGH (ref 70–99)
GLUCOSE BLDC GLUCOMTR-MCNC: 158 MG/DL — HIGH (ref 70–99)
GLUCOSE BLDC GLUCOMTR-MCNC: 158 MG/DL — HIGH (ref 70–99)
GLUCOSE BLDC GLUCOMTR-MCNC: 201 MG/DL — HIGH (ref 70–99)
GLUCOSE BLDC GLUCOMTR-MCNC: 201 MG/DL — HIGH (ref 70–99)
GLUCOSE BLDC GLUCOMTR-MCNC: 208 MG/DL — HIGH (ref 70–99)
GLUCOSE BLDC GLUCOMTR-MCNC: 208 MG/DL — HIGH (ref 70–99)
GLUCOSE SERPL-MCNC: 108 MG/DL — HIGH (ref 70–99)
GLUCOSE SERPL-MCNC: 108 MG/DL — HIGH (ref 70–99)
HCT VFR BLD CALC: 38.9 % — SIGNIFICANT CHANGE UP (ref 34.5–45)
HCT VFR BLD CALC: 38.9 % — SIGNIFICANT CHANGE UP (ref 34.5–45)
HGB BLD-MCNC: 11.9 G/DL — SIGNIFICANT CHANGE UP (ref 11.5–15.5)
HGB BLD-MCNC: 11.9 G/DL — SIGNIFICANT CHANGE UP (ref 11.5–15.5)
IMM GRANULOCYTES NFR BLD AUTO: 1.5 % — HIGH (ref 0–0.9)
IMM GRANULOCYTES NFR BLD AUTO: 1.5 % — HIGH (ref 0–0.9)
INR BLD: 1.34 RATIO — HIGH (ref 0.85–1.18)
INR BLD: 1.34 RATIO — HIGH (ref 0.85–1.18)
LYMPHOCYTES # BLD AUTO: 1.11 K/UL — SIGNIFICANT CHANGE UP (ref 1–3.3)
LYMPHOCYTES # BLD AUTO: 1.11 K/UL — SIGNIFICANT CHANGE UP (ref 1–3.3)
LYMPHOCYTES # BLD AUTO: 14.8 % — SIGNIFICANT CHANGE UP (ref 13–44)
LYMPHOCYTES # BLD AUTO: 14.8 % — SIGNIFICANT CHANGE UP (ref 13–44)
MAGNESIUM SERPL-MCNC: 2.3 MG/DL — SIGNIFICANT CHANGE UP (ref 1.6–2.6)
MAGNESIUM SERPL-MCNC: 2.3 MG/DL — SIGNIFICANT CHANGE UP (ref 1.6–2.6)
MCHC RBC-ENTMCNC: 29.2 PG — SIGNIFICANT CHANGE UP (ref 27–34)
MCHC RBC-ENTMCNC: 29.2 PG — SIGNIFICANT CHANGE UP (ref 27–34)
MCHC RBC-ENTMCNC: 30.6 GM/DL — LOW (ref 32–36)
MCHC RBC-ENTMCNC: 30.6 GM/DL — LOW (ref 32–36)
MCV RBC AUTO: 95.3 FL — SIGNIFICANT CHANGE UP (ref 80–100)
MCV RBC AUTO: 95.3 FL — SIGNIFICANT CHANGE UP (ref 80–100)
MONOCYTES # BLD AUTO: 0.62 K/UL — SIGNIFICANT CHANGE UP (ref 0–0.9)
MONOCYTES # BLD AUTO: 0.62 K/UL — SIGNIFICANT CHANGE UP (ref 0–0.9)
MONOCYTES NFR BLD AUTO: 8.2 % — SIGNIFICANT CHANGE UP (ref 2–14)
MONOCYTES NFR BLD AUTO: 8.2 % — SIGNIFICANT CHANGE UP (ref 2–14)
NEUTROPHILS # BLD AUTO: 5.35 K/UL — SIGNIFICANT CHANGE UP (ref 1.8–7.4)
NEUTROPHILS # BLD AUTO: 5.35 K/UL — SIGNIFICANT CHANGE UP (ref 1.8–7.4)
NEUTROPHILS NFR BLD AUTO: 71.1 % — SIGNIFICANT CHANGE UP (ref 43–77)
NEUTROPHILS NFR BLD AUTO: 71.1 % — SIGNIFICANT CHANGE UP (ref 43–77)
NRBC # BLD: 0 /100 WBCS — SIGNIFICANT CHANGE UP (ref 0–0)
NRBC # BLD: 0 /100 WBCS — SIGNIFICANT CHANGE UP (ref 0–0)
PHOSPHATE SERPL-MCNC: 4.2 MG/DL — SIGNIFICANT CHANGE UP (ref 2.5–4.5)
PHOSPHATE SERPL-MCNC: 4.2 MG/DL — SIGNIFICANT CHANGE UP (ref 2.5–4.5)
PLATELET # BLD AUTO: 194 K/UL — SIGNIFICANT CHANGE UP (ref 150–400)
PLATELET # BLD AUTO: 194 K/UL — SIGNIFICANT CHANGE UP (ref 150–400)
POTASSIUM SERPL-MCNC: 5.1 MMOL/L — SIGNIFICANT CHANGE UP (ref 3.5–5.3)
POTASSIUM SERPL-MCNC: 5.1 MMOL/L — SIGNIFICANT CHANGE UP (ref 3.5–5.3)
POTASSIUM SERPL-SCNC: 5.1 MMOL/L — SIGNIFICANT CHANGE UP (ref 3.5–5.3)
POTASSIUM SERPL-SCNC: 5.1 MMOL/L — SIGNIFICANT CHANGE UP (ref 3.5–5.3)
PROT SERPL-MCNC: 6.6 G/DL — SIGNIFICANT CHANGE UP (ref 6–8.3)
PROT SERPL-MCNC: 6.6 G/DL — SIGNIFICANT CHANGE UP (ref 6–8.3)
PROTHROM AB SERPL-ACNC: 14.6 SEC — HIGH (ref 9.5–13)
PROTHROM AB SERPL-ACNC: 14.6 SEC — HIGH (ref 9.5–13)
RBC # BLD: 4.08 M/UL — SIGNIFICANT CHANGE UP (ref 3.8–5.2)
RBC # BLD: 4.08 M/UL — SIGNIFICANT CHANGE UP (ref 3.8–5.2)
RBC # FLD: 15.8 % — HIGH (ref 10.3–14.5)
RBC # FLD: 15.8 % — HIGH (ref 10.3–14.5)
SODIUM SERPL-SCNC: 139 MMOL/L — SIGNIFICANT CHANGE UP (ref 135–145)
SODIUM SERPL-SCNC: 139 MMOL/L — SIGNIFICANT CHANGE UP (ref 135–145)
SPECIMEN SOURCE: SIGNIFICANT CHANGE UP
SPECIMEN SOURCE: SIGNIFICANT CHANGE UP
WBC # BLD: 7.52 K/UL — SIGNIFICANT CHANGE UP (ref 3.8–10.5)
WBC # BLD: 7.52 K/UL — SIGNIFICANT CHANGE UP (ref 3.8–10.5)
WBC # FLD AUTO: 7.52 K/UL — SIGNIFICANT CHANGE UP (ref 3.8–10.5)
WBC # FLD AUTO: 7.52 K/UL — SIGNIFICANT CHANGE UP (ref 3.8–10.5)

## 2023-11-25 PROCEDURE — 99231 SBSQ HOSP IP/OBS SF/LOW 25: CPT

## 2023-11-25 PROCEDURE — 99223 1ST HOSP IP/OBS HIGH 75: CPT

## 2023-11-25 RX ORDER — LIDOCAINE 4 G/100G
1 CREAM TOPICAL ONCE
Refills: 0 | Status: COMPLETED | OUTPATIENT
Start: 2023-11-25 | End: 2023-11-28

## 2023-11-25 RX ORDER — DEXTROSE 50 % IN WATER 50 %
12.5 SYRINGE (ML) INTRAVENOUS ONCE
Refills: 0 | Status: DISCONTINUED | OUTPATIENT
Start: 2023-11-25 | End: 2023-12-01

## 2023-11-25 RX ORDER — DEXTROSE 50 % IN WATER 50 %
25 SYRINGE (ML) INTRAVENOUS ONCE
Refills: 0 | Status: DISCONTINUED | OUTPATIENT
Start: 2023-11-25 | End: 2023-12-01

## 2023-11-25 RX ORDER — OXYCODONE HYDROCHLORIDE 5 MG/1
60 TABLET ORAL EVERY 12 HOURS
Refills: 0 | Status: DISCONTINUED | OUTPATIENT
Start: 2023-11-25 | End: 2023-12-01

## 2023-11-25 RX ORDER — INSULIN GLARGINE 100 [IU]/ML
25 INJECTION, SOLUTION SUBCUTANEOUS AT BEDTIME
Refills: 0 | Status: DISCONTINUED | OUTPATIENT
Start: 2023-11-25 | End: 2023-12-01

## 2023-11-25 RX ORDER — ATORVASTATIN CALCIUM 80 MG/1
80 TABLET, FILM COATED ORAL AT BEDTIME
Refills: 0 | Status: DISCONTINUED | OUTPATIENT
Start: 2023-11-25 | End: 2023-12-01

## 2023-11-25 RX ORDER — ACETAMINOPHEN 500 MG
650 TABLET ORAL EVERY 6 HOURS
Refills: 0 | Status: DISCONTINUED | OUTPATIENT
Start: 2023-11-25 | End: 2023-12-01

## 2023-11-25 RX ORDER — BUDESONIDE AND FORMOTEROL FUMARATE DIHYDRATE 160; 4.5 UG/1; UG/1
2 AEROSOL RESPIRATORY (INHALATION)
Refills: 0 | Status: DISCONTINUED | OUTPATIENT
Start: 2023-11-25 | End: 2023-12-01

## 2023-11-25 RX ORDER — INSULIN LISPRO 100/ML
VIAL (ML) SUBCUTANEOUS AT BEDTIME
Refills: 0 | Status: DISCONTINUED | OUTPATIENT
Start: 2023-11-25 | End: 2023-12-01

## 2023-11-25 RX ORDER — OXYCODONE HYDROCHLORIDE 5 MG/1
5 TABLET ORAL EVERY 6 HOURS
Refills: 0 | Status: DISCONTINUED | OUTPATIENT
Start: 2023-11-25 | End: 2023-12-01

## 2023-11-25 RX ORDER — INSULIN LISPRO 100/ML
VIAL (ML) SUBCUTANEOUS
Refills: 0 | Status: DISCONTINUED | OUTPATIENT
Start: 2023-11-25 | End: 2023-12-01

## 2023-11-25 RX ORDER — FUROSEMIDE 40 MG
40 TABLET ORAL DAILY
Refills: 0 | Status: DISCONTINUED | OUTPATIENT
Start: 2023-11-25 | End: 2023-12-01

## 2023-11-25 RX ORDER — SODIUM CHLORIDE 9 MG/ML
1000 INJECTION, SOLUTION INTRAVENOUS
Refills: 0 | Status: DISCONTINUED | OUTPATIENT
Start: 2023-11-25 | End: 2023-12-01

## 2023-11-25 RX ORDER — DEXTROSE 50 % IN WATER 50 %
15 SYRINGE (ML) INTRAVENOUS ONCE
Refills: 0 | Status: DISCONTINUED | OUTPATIENT
Start: 2023-11-25 | End: 2023-12-01

## 2023-11-25 RX ORDER — SENNA PLUS 8.6 MG/1
2 TABLET ORAL AT BEDTIME
Refills: 0 | Status: DISCONTINUED | OUTPATIENT
Start: 2023-11-25 | End: 2023-12-01

## 2023-11-25 RX ORDER — NYSTATIN CREAM 100000 [USP'U]/G
1 CREAM TOPICAL THREE TIMES A DAY
Refills: 0 | Status: DISCONTINUED | OUTPATIENT
Start: 2023-11-25 | End: 2023-12-01

## 2023-11-25 RX ORDER — APIXABAN 2.5 MG/1
5 TABLET, FILM COATED ORAL EVERY 12 HOURS
Refills: 0 | Status: DISCONTINUED | OUTPATIENT
Start: 2023-11-25 | End: 2023-12-01

## 2023-11-25 RX ORDER — INSULIN LISPRO 100/ML
10 VIAL (ML) SUBCUTANEOUS
Refills: 0 | Status: DISCONTINUED | OUTPATIENT
Start: 2023-11-25 | End: 2023-12-01

## 2023-11-25 RX ORDER — GLUCAGON INJECTION, SOLUTION 0.5 MG/.1ML
1 INJECTION, SOLUTION SUBCUTANEOUS ONCE
Refills: 0 | Status: DISCONTINUED | OUTPATIENT
Start: 2023-11-25 | End: 2023-12-01

## 2023-11-25 RX ORDER — NITROGLYCERIN 6.5 MG
1 CAPSULE, EXTENDED RELEASE ORAL
Refills: 0 | Status: DISCONTINUED | OUTPATIENT
Start: 2023-11-25 | End: 2023-12-01

## 2023-11-25 RX ORDER — POLYETHYLENE GLYCOL 3350 17 G/17G
17 POWDER, FOR SOLUTION ORAL
Refills: 0 | Status: DISCONTINUED | OUTPATIENT
Start: 2023-11-25 | End: 2023-12-01

## 2023-11-25 RX ORDER — ALBUTEROL 90 UG/1
2 AEROSOL, METERED ORAL EVERY 6 HOURS
Refills: 0 | Status: DISCONTINUED | OUTPATIENT
Start: 2023-11-25 | End: 2023-12-01

## 2023-11-25 RX ADMIN — Medication 2: at 18:15

## 2023-11-25 RX ADMIN — Medication 10 UNIT(S): at 18:16

## 2023-11-25 RX ADMIN — OXYCODONE HYDROCHLORIDE 60 MILLIGRAM(S): 5 TABLET ORAL at 05:27

## 2023-11-25 RX ADMIN — NYSTATIN CREAM 1 APPLICATION(S): 100000 CREAM TOPICAL at 21:54

## 2023-11-25 RX ADMIN — APIXABAN 5 MILLIGRAM(S): 2.5 TABLET, FILM COATED ORAL at 18:18

## 2023-11-25 RX ADMIN — Medication 10 UNIT(S): at 11:44

## 2023-11-25 RX ADMIN — ATORVASTATIN CALCIUM 80 MILLIGRAM(S): 80 TABLET, FILM COATED ORAL at 21:55

## 2023-11-25 RX ADMIN — Medication 10 UNIT(S): at 09:13

## 2023-11-25 RX ADMIN — Medication 2: at 11:43

## 2023-11-25 RX ADMIN — SENNA PLUS 2 TABLET(S): 8.6 TABLET ORAL at 21:55

## 2023-11-25 RX ADMIN — OXYCODONE HYDROCHLORIDE 5 MILLIGRAM(S): 5 TABLET ORAL at 22:24

## 2023-11-25 RX ADMIN — APIXABAN 5 MILLIGRAM(S): 2.5 TABLET, FILM COATED ORAL at 05:28

## 2023-11-25 RX ADMIN — NYSTATIN CREAM 1 APPLICATION(S): 100000 CREAM TOPICAL at 11:47

## 2023-11-25 RX ADMIN — OXYCODONE HYDROCHLORIDE 5 MILLIGRAM(S): 5 TABLET ORAL at 15:17

## 2023-11-25 RX ADMIN — Medication 40 MILLIGRAM(S): at 05:28

## 2023-11-25 RX ADMIN — OXYCODONE HYDROCHLORIDE 5 MILLIGRAM(S): 5 TABLET ORAL at 19:09

## 2023-11-25 RX ADMIN — Medication 1 APPLICATION(S): at 18:26

## 2023-11-25 RX ADMIN — OXYCODONE HYDROCHLORIDE 60 MILLIGRAM(S): 5 TABLET ORAL at 18:25

## 2023-11-25 RX ADMIN — OXYCODONE HYDROCHLORIDE 5 MILLIGRAM(S): 5 TABLET ORAL at 10:00

## 2023-11-25 RX ADMIN — BUDESONIDE AND FORMOTEROL FUMARATE DIHYDRATE 2 PUFF(S): 160; 4.5 AEROSOL RESPIRATORY (INHALATION) at 05:27

## 2023-11-25 RX ADMIN — OXYCODONE HYDROCHLORIDE 5 MILLIGRAM(S): 5 TABLET ORAL at 21:54

## 2023-11-25 RX ADMIN — POLYETHYLENE GLYCOL 3350 17 GRAM(S): 17 POWDER, FOR SOLUTION ORAL at 05:28

## 2023-11-25 RX ADMIN — OXYCODONE HYDROCHLORIDE 5 MILLIGRAM(S): 5 TABLET ORAL at 09:12

## 2023-11-25 RX ADMIN — POLYETHYLENE GLYCOL 3350 17 GRAM(S): 17 POWDER, FOR SOLUTION ORAL at 18:17

## 2023-11-25 RX ADMIN — INSULIN GLARGINE 25 UNIT(S): 100 INJECTION, SOLUTION SUBCUTANEOUS at 22:09

## 2023-11-25 RX ADMIN — BUDESONIDE AND FORMOTEROL FUMARATE DIHYDRATE 2 PUFF(S): 160; 4.5 AEROSOL RESPIRATORY (INHALATION) at 18:17

## 2023-11-25 RX ADMIN — Medication 1 APPLICATION(S): at 05:28

## 2023-11-25 RX ADMIN — OXYCODONE HYDROCHLORIDE 60 MILLIGRAM(S): 5 TABLET ORAL at 19:08

## 2023-11-25 RX ADMIN — OXYCODONE HYDROCHLORIDE 60 MILLIGRAM(S): 5 TABLET ORAL at 05:57

## 2023-11-25 NOTE — PROGRESS NOTE ADULT - SUBJECTIVE AND OBJECTIVE BOX
HPI  69yo F w/ PMH of HTN, HLD, DM2, HFpEF, CKD III, DVT, Uterine CA and recent hospitalizations including at [facility] for rectal pain i/s/o likely anal fissures pw rectal pain w/ defecation likely i/s/o anal fissures.    INTERVAL EVENTS  11/25: patient states having continued pain, has not had BM yet    MEDICATIONS  (STANDING):  apixaban 5 milliGRAM(s) Oral every 12 hours  atorvastatin 80 milliGRAM(s) Oral at bedtime  budesonide 160 MICROgram(s)/formoterol 4.5 MICROgram(s) Inhaler 2 Puff(s) Inhalation two times a day  dextrose 5%. 1000 milliLiter(s) (50 mL/Hr) IV Continuous <Continuous>  dextrose 5%. 1000 milliLiter(s) (100 mL/Hr) IV Continuous <Continuous>  dextrose 50% Injectable 25 Gram(s) IV Push once  dextrose 50% Injectable 12.5 Gram(s) IV Push once  dextrose 50% Injectable 25 Gram(s) IV Push once  furosemide    Tablet 40 milliGRAM(s) Oral daily  glucagon  Injectable 1 milliGRAM(s) IntraMuscular once  insulin glargine Injectable (LANTUS) 25 Unit(s) SubCutaneous at bedtime  insulin lispro (ADMELOG) corrective regimen sliding scale   SubCutaneous three times a day before meals  insulin lispro (ADMELOG) corrective regimen sliding scale   SubCutaneous at bedtime  insulin lispro Injectable (ADMELOG) 10 Unit(s) SubCutaneous three times a day before meals  lidocaine 4% Cream 1 Application(s) Topical once  nitroglycerin  0.2% Ointment Compound 1 Application(s) Rectal two times a day  nystatin Powder 1 Application(s) Topical three times a day  oxyCODONE  ER Tablet 60 milliGRAM(s) Oral every 12 hours  polyethylene glycol 3350 17 Gram(s) Oral two times a day  senna 2 Tablet(s) Oral at bedtime    MEDICATIONS  (PRN):  acetaminophen     Tablet .. 650 milliGRAM(s) Oral every 6 hours PRN Temp greater or equal to 38C (100.4F), Mild Pain (1 - 3)  albuterol    90 MICROgram(s) HFA Inhaler 2 Puff(s) Inhalation every 6 hours PRN Shortness of Breath and/or Wheezing  dextrose Oral Gel 15 Gram(s) Oral once PRN Blood Glucose LESS THAN 70 milliGRAM(s)/deciliter  oxyCODONE    IR 5 milliGRAM(s) Oral every 6 hours PRN Moderate Pain (4 - 6)    Vital Signs Last 24 Hrs  T(C): 36.4 (25 Nov 2023 08:44), Max: 36.9 (24 Nov 2023 22:30)  T(F): 97.5 (25 Nov 2023 08:44), Max: 98.5 (24 Nov 2023 22:30)  HR: 73 (25 Nov 2023 09:35) (66 - 77)  BP: 123/69 (25 Nov 2023 09:35) (102/58 - 132/60)  BP(mean): 71 (24 Nov 2023 18:03) (60 - 71)  RR: 18 (25 Nov 2023 08:44) (16 - 20)  SpO2: 94% (25 Nov 2023 09:35) (94% - 99%)    Parameters below as of 25 Nov 2023 09:35  Patient On (Oxygen Delivery Method): nasal cannula  O2 Flow (L/min): 4    CONSTITUTIONAL: NAD, well-developed, well-groomed  EYES: conjunctiva and sclera clear  ENMT: normal  RESPIRATORY: normal respiratory effort; lungs are clear to auscultation bilaterally  CARDIOVASCULAR: S1S2, RRR  ABDOMEN: +BS, NTND  PSYCH: affect appropriate  NEUROLOGY: grossly normal  SKIN: no jaundice    LABS:                        11.9   7.52  )-----------( 194      ( 25 Nov 2023 09:00 )             38.9     11-25    139  |  103  |  41<H>  ----------------------------<  108<H>  5.1   |  27  |  2.09<H>    Ca    9.6      25 Nov 2023 09:00  Phos  4.2     11-25  Mg     2.3     11-25      IMAGING:  CT A/P  Limited by patient body habitus resulting in the right anterolateral   aspect of the abdomen out of field of view.    No perirectal or perianal abscess.    No acute abnormality identified.

## 2023-11-25 NOTE — PROGRESS NOTE ADULT - SUBJECTIVE AND OBJECTIVE BOX
Surgery Progress Note    SUBJECTIVE: Pt seen and examined at bedside. Patient comfortable and in no-apparent distress.     Vital Signs Last 24 Hrs  T(C): 36.4 (2023 08:44), Max: 36.9 (2023 09:41)  T(F): 97.5 (2023 08:44), Max: 98.5 (2023 22:30)  HR: 66 (2023 08:44) (66 - 77)  BP: 121/57 (2023 08:44) (93/55 - 132/60)  BP(mean): 71 (2023 18:03) (60 - 71)  RR: 18 (2023 08:44) (16 - 20)  SpO2: 97% (2023 08:44) (95% - 99%)    Parameters below as of 2023 08:44  Patient On (Oxygen Delivery Method): nasal cannula  O2 Flow (L/min): 3      Physical Exam:  General Appearance: Appears well, NAD  Respiratory: No labored breathing  CV: Pulse regularly present  Abdomen: Soft, nontender    LABS:                        11.9   7.52  )-----------( 194      ( 2023 09:00 )             38.9     11-24    143  |  102  |  43<H>  ----------------------------<  138<H>  5.3   |  28  |  1.94<H>    Ca    9.9      2023 12:17    TPro  7.0  /  Alb  3.8  /  TBili  0.6  /  DBili  x   /  AST  24  /  ALT  22  /  AlkPhos  173<H>  11-24    PT/INR - ( 2023 09:48 )   PT: 16.9 sec;   INR: 1.63 ratio         PTT - ( 2023 09:48 )  PTT:32.4 sec  Urinalysis Basic - ( 2023 14:48 )    Color: Yellow / Appearance: Clear / S.019 / pH: x  Gluc: x / Ketone: Negative mg/dL  / Bili: Negative / Urobili: 1.0 mg/dL   Blood: x / Protein: Negative mg/dL / Nitrite: Negative   Leuk Esterase: Trace / RBC: 1 /HPF / WBC 1 /HPF   Sq Epi: x / Non Sq Epi: 1 /HPF / Bacteria: Negative /HPF        INs and OUTs:

## 2023-11-25 NOTE — H&P ADULT - HISTORY OF PRESENT ILLNESS
Pt is a 69yo F w/ PMH of HTN, HLD, DM2, HFpEF, CKD III, DVT, Uterine CA and recent hospitalizations including at [facility] for rectal pain i/s/o likely anal fissures pw rectal pain w/ defecation.     Reports 1 day of increased rectal pain that's worse w/ passing of flatus or w/ BMs. Denies any hematochezia, melena, abd pain. She does report not being able to complete sitz baths at home and has recently been discharged from rehab and unable to take care of her ADLs at home due to limited mobility.    In the ED VSS on chronic 4L NC w/ labs non-actionable and CT A/P w/out acute findings. She was administered Morphine 4mg x3, Lidocaine cream for anal fissure.

## 2023-11-25 NOTE — PROGRESS NOTE ADULT - ASSESSMENT
68F PMHx morbid obesity, HTN, HLD, DM2, HFpEF, CKD III, DVT, Uterine CA s/p MEGAN/BSO @ [facility], who presents to [facility] ED w rectal pain. CRS consulted to evaluate.     Exam consistent w anal fissure. Pt has hx of anal fissure, previously evaluated by CRS, referred for OP f/u with regimen of Sitz Bath TID/Bowel Regimen/Nifedipine cream.     Plan:   -No acute surgical intervention   -SITZ Bath TID  -increased fiber intake w metamucil  -Lidocaine cream after every BM   -Nifedipine cream   -OP f/u Dr. Cunningham Gamma  -Re-page with any questions or concerns     [employer], [id number]

## 2023-11-25 NOTE — PHYSICAL THERAPY INITIAL EVALUATION ADULT - GENERAL OBSERVATIONS, REHAB EVAL
Pt a/w rectal pain, CT A&P (-) for acute findings. Pt received semi-supine in bed in NAD, VSS, A&Ox4, +purewick, agreeable to PT.

## 2023-11-25 NOTE — PHYSICAL THERAPY INITIAL EVALUATION ADULT - BALANCE TRAINING, PT EVAL
GOAL: Patient will demonstrate an increase in dynamic balance in sitting where deficient by at least 1 grade within 2 weeks to facilitate greater independence during functional mobility and ADL's.

## 2023-11-25 NOTE — H&P ADULT - PROBLEM SELECTOR PLAN 8
- Nutrition c/s  - Reports inability to care for self at home as left rehab too early  - PT in AM  - SW in AM re help at home as potentially requiring/requesting more  - c/w home pain regimen

## 2023-11-25 NOTE — PHYSICAL THERAPY INITIAL EVALUATION ADULT - PERTINENT HX OF CURRENT PROBLEM, REHAB EVAL
67yo F w/ PMH of HTN, HLD, DM2, HFpEF, CKD III, DVT, Uterine CA and recent hospitalizations including at [facility] for rectal pain i/s/o likely anal fissures pw rectal pain w/ defecation. Reports 1 day of increased rectal pain that's worse w/ passing of flatus or w/ BMs. Denies any hematochezia, melena, abd pain. She does report not being able to complete sitz baths at home and has recently been discharged from rehab and unable to take care of her ADLs at home due to limited mobility. In the ED VSS on chronic 4L NC w/ labs non-actionable and CT A/P w/out acute findings. She was administered Morphine 4mg x3, Lidocaine cream for anal fissure.

## 2023-11-25 NOTE — PHYSICAL THERAPY INITIAL EVALUATION ADULT - RANGE OF MOTION EXAMINATION, REHAB EVAL
hip & knee limited/bilateral upper extremity ROM was WFL (within functional limits)/bilateral lower extremity ROM was WFL (within functional limits)

## 2023-11-25 NOTE — H&P ADULT - NSHPLABSRESULTS_GEN_ALL_CORE
LABS:                      11.7   10.89 )-----------( 243      ( 2023 09:48 )             38.6         143  |  102  |  43<H>  ----------------------------<  138<H>  5.3   |  28  |  1.94<H>    Ca    9.9      2023 12:17    TPro  7.0  /  Alb  3.8  /  TBili  0.6  /  DBili  x   /  AST  24  /  ALT  22  /  AlkPhos  173<H>  1124    LIVER FUNCTIONS - ( 2023 09:48 )  Alb: 3.8 g/dL / Pro: 7.0 g/dL / ALK PHOS: 173 U/L / ALT: 22 U/L / AST: 24 U/L / GGT: x           PT/INR - ( 2023 09:48 )   PT: 16.9 sec;   INR: 1.63 ratio    PTT - ( 2023 09:48 )  PTT:32.4 sec    Urinalysis Basic - ( 2023 14:48 )  Color: Yellow / Appearance: Clear / S.019 / pH: x  Gluc: x / Ketone: Negative mg/dL  / Bili: Negative / Urobili: 1.0 mg/dL   Blood: x / Protein: Negative mg/dL / Nitrite: Negative   Leuk Esterase: Trace / RBC: 1 /HPF / WBC 1 /HPF   Sq Epi: x / Non Sq Epi: 1 /HPF / Bacteria: Negative /HPF    IMAGING:  CT Abdomen and Pelvis No Cont (23 @ 17:20)  IMPRESSION:  - Limited by patient body habitus resulting in the right anterolateral aspect of the abdomen out of field of view.  - No perirectal or perianal abscess.  - No acute abnormality identified.    [X] Imaging personally reviewed by me- No perirectal abscess noted   [X] ECG personally interpreted by me- NSR w/out acute ischemic changes

## 2023-11-25 NOTE — H&P ADULT - NSHPREVIEWOFSYSTEMS_GEN_ALL_CORE
CONSTITUTIONAL: No fever, weight loss  EYES: No eye pain, visual disturbances, or discharge  ENMT:  No difficulty hearing, tinnitus, vertigo; No sinus or throat pain  RESPIRATORY: No SOB. No cough, wheezing, chills or hemoptysis  CARDIOVASCULAR: No chest pain, palpitations, dizziness, or leg swelling  GASTROINTESTINAL: Rectal pain. No abdominal or epigastric pain. No nausea, vomiting, or hematemesis; No diarrhea or constipation. No melena or hematochezia.  GENITOURINARY: No dysuria, frequency, hematuria, or incontinence  NEUROLOGICAL: No headaches, memory loss, loss of strength, numbness, or tremors  SKIN: No itching, burning, rashes, or lesions   LYMPH NODES: No enlarged glands  ENDOCRINE: No heat or cold intolerance; No hair loss  MUSCULOSKELETAL: Weaknes. No joint pain or swelling; No muscle, back pain  PSYCHIATRIC: No depression, anxiety, mood swings, or difficulty sleeping  HEME/LYMPH: No easy bruising, or bleeding gums

## 2023-11-25 NOTE — PHYSICAL THERAPY INITIAL EVALUATION ADULT - MANUAL MUSCLE TESTING RESULTS, REHAB EVAL
UE grossly assessed to be at least 3/5, LE grossly assessed to be at least 2/5/grossly assessed due to

## 2023-11-25 NOTE — PATIENT PROFILE ADULT - NSPROPTRIGHTCAREGIVER_GEN_A_NUR
Personalized Preventive Plan for Adult [facility] - 5/23/2022  Medicare offers a range of preventive health benefits. Some of the tests and screenings are paid in full while other may be subject to a deductible, co-insurance, and/or copay. Some of these benefits include a comprehensive review of your medical history including lifestyle, illnesses that may run in your family, and various assessments and screenings as appropriate. After reviewing your medical record and screening and assessments performed today your provider may have ordered immunizations, labs, imaging, and/or referrals for you. A list of these orders (if applicable) as well as your Preventive Care list are included within your After Visit Summary for your review. Other Preventive Recommendations:    · A preventive eye exam performed by an eye specialist is recommended every 1-2 years to screen for glaucoma; cataracts, macular degeneration, and other eye disorders. · A preventive dental visit is recommended every 6 months. · Try to get at least 150 minutes of exercise per week or 10,000 steps per day on a pedometer . · Order or download the FREE \"Exercise & Physical Activity: Your Everyday Guide\" from [employer]. Call 0-427.584.2715 or search [employer] online. · You need 2136-2672 mg of calcium and 2809-3857 IU of vitamin D per day. It is possible to meet your calcium requirement with diet alone, but a vitamin D supplement is usually necessary to meet this goal.  · When exposed to the sun, use a sunscreen that protects against both UVA and UVB radiation with an SPF of 30 or greater. Reapply every 2 to 3 hours or after sweating, drying off with a towel, or swimming. · Always wear a seat belt when traveling in a car. Always wear a helmet when riding a bicycle or motorcycle.
no

## 2023-11-25 NOTE — H&P ADULT - NSHPPHYSICALEXAM_GEN_ALL_CORE
Vital Signs Last 24 Hrs  T(C): 36.9 (25 Nov 2023 00:54), Max: 37 (24 Nov 2023 08:59)  T(F): 98.5 (25 Nov 2023 00:54), Max: 98.6 (24 Nov 2023 08:59)  HR: 77 (25 Nov 2023 00:54) (69 - 78)  BP: 132/60 (25 Nov 2023 00:54) (93/55 - 132/60)  BP(mean): 71 (24 Nov 2023 18:03) (60 - 71)  RR: 16 (25 Nov 2023 00:54) (16 - 20)  SpO2: 99% (25 Nov 2023 00:54) (96% - 99%)    Parameters below as of 25 Nov 2023 00:54  Patient On (Oxygen Delivery Method): nasal cannula O2 Flow (L/min): 4    CONSTITUTIONAL: Well-groomed, in no apparent distress  EYES: No conjunctival or scleral injection, non-icteric;   ENMT: No external nasal lesions; MMM  NECK: Trachea midline without palpable neck mass; thyroid not enlarged and non-tender  RESPIRATORY: Breathing comfortably; no dullness to percussion; lungs CTA without wheeze/rhonchi/rales though limited d/t body habitus  CARDIOVASCULAR: Difficult examination d/t body habitus. +S1S2, RRR, no M/G/R; pedal pulses full and symmetric; no lower extremity edema  GASTROINTESTINAL: Surgical scars present. No palpable masses or tenderness, +BS throughout, no rebound/guarding; no hepatosplenomegaly; no hernia palpated  LYMPHATIC: No cervical LAD or tenderness  SKIN: Stasis dermatitis in LE w/ open wound on LLE. No rashes or ulcers noted  NEUROLOGIC: CN II-XII intact; sensation intact in LEs b/l to light touch  PSYCHIATRIC: A&Ox3; mood and affect appropriate; appropriate insight and judgment

## 2023-11-25 NOTE — H&P ADULT - ASSESSMENT
69yo F w/ PMH of HTN, HLD, DM2, HFpEF, CKD III, DVT, Uterine CA and recent hospitalizations including at [facility] for rectal pain i/s/o likely anal fissures pw rectal pain w/ defecation likely i/s/o anal fissures.

## 2023-11-25 NOTE — PATIENT PROFILE ADULT - FALL HARM RISK - HARM RISK INTERVENTIONS

## 2023-11-25 NOTE — H&P ADULT - PROBLEM SELECTOR PLAN 3
- [place] w/ elevated enzymes at OSH on recent admission w/ suspected PE at the time  - Now on Eliquis 5mg BID, continue.

## 2023-11-25 NOTE — H&P ADULT - PROBLEM SELECTOR PLAN 1
- No acute surgical intervention   - SITZ Bath TID  - Miralax BID and Senna qhs   - Lidocaine cream after every BM while on pain regimen  - Nifedipine cream upon discharge (not on hospital formulary)  - Nitroglycerin ointment BID for now  - OP f/u Dr. Holman

## 2023-11-25 NOTE — PROGRESS NOTE ADULT - ASSESSMENT
69yo F w/ PMH of HTN, HLD, DM2, HFpEF, CKD III, DVT, Uterine CA and recent hospitalizations including at [facility] for rectal pain i/s/o likely anal fissures pw rectal pain w/ defecation likely i/s/o anal fissures.

## 2023-11-25 NOTE — H&P ADULT - PROBLEM SELECTOR PLAN 2
- On Lantus 56U at home and Admelog 20-26U at home  - Will start weight based for now to avoid hypoglycemia as had in the past   - Start Lantus 25U qHS and Admelog 10U TID w/ meals along w/ TIARA  - f/u AM A1c  - CC diet  - Nutrition c/s

## 2023-11-25 NOTE — PHYSICAL THERAPY INITIAL EVALUATION ADULT - ADDITIONAL COMMENTS
PTA pt was recently d/c from subacute rehab in early november, states she had been working on sit<->stand transfers, was not yet taking steps. Pt was d/c home due to coverage lapse, at home pt was using hospital bed however had difficulty getting on/off due to pt height/weight, pt required assistance for mobility, continued to have increasing difficulty.

## 2023-11-26 LAB
ANION GAP SERPL CALC-SCNC: 12 MMOL/L — SIGNIFICANT CHANGE UP (ref 5–17)
ANION GAP SERPL CALC-SCNC: 12 MMOL/L — SIGNIFICANT CHANGE UP (ref 5–17)
BUN SERPL-MCNC: 45 MG/DL — HIGH (ref 7–23)
BUN SERPL-MCNC: 45 MG/DL — HIGH (ref 7–23)
CALCIUM SERPL-MCNC: 9.5 MG/DL — SIGNIFICANT CHANGE UP (ref 8.4–10.5)
CALCIUM SERPL-MCNC: 9.5 MG/DL — SIGNIFICANT CHANGE UP (ref 8.4–10.5)
CHLORIDE SERPL-SCNC: 100 MMOL/L — SIGNIFICANT CHANGE UP (ref 96–108)
CHLORIDE SERPL-SCNC: 100 MMOL/L — SIGNIFICANT CHANGE UP (ref 96–108)
CO2 SERPL-SCNC: 27 MMOL/L — SIGNIFICANT CHANGE UP (ref 22–31)
CO2 SERPL-SCNC: 27 MMOL/L — SIGNIFICANT CHANGE UP (ref 22–31)
CREAT SERPL-MCNC: 2.01 MG/DL — HIGH (ref 0.5–1.3)
CREAT SERPL-MCNC: 2.01 MG/DL — HIGH (ref 0.5–1.3)
EGFR: 26 ML/MIN/1.73M2 — LOW
EGFR: 26 ML/MIN/1.73M2 — LOW
GLUCOSE BLDC GLUCOMTR-MCNC: 145 MG/DL — HIGH (ref 70–99)
GLUCOSE BLDC GLUCOMTR-MCNC: 145 MG/DL — HIGH (ref 70–99)
GLUCOSE BLDC GLUCOMTR-MCNC: 171 MG/DL — HIGH (ref 70–99)
GLUCOSE BLDC GLUCOMTR-MCNC: 171 MG/DL — HIGH (ref 70–99)
GLUCOSE BLDC GLUCOMTR-MCNC: 194 MG/DL — HIGH (ref 70–99)
GLUCOSE BLDC GLUCOMTR-MCNC: 194 MG/DL — HIGH (ref 70–99)
GLUCOSE BLDC GLUCOMTR-MCNC: 231 MG/DL — HIGH (ref 70–99)
GLUCOSE BLDC GLUCOMTR-MCNC: 231 MG/DL — HIGH (ref 70–99)
GLUCOSE SERPL-MCNC: 148 MG/DL — HIGH (ref 70–99)
GLUCOSE SERPL-MCNC: 148 MG/DL — HIGH (ref 70–99)
HCT VFR BLD CALC: 36.5 % — SIGNIFICANT CHANGE UP (ref 34.5–45)
HCT VFR BLD CALC: 36.5 % — SIGNIFICANT CHANGE UP (ref 34.5–45)
HGB BLD-MCNC: 11.2 G/DL — LOW (ref 11.5–15.5)
HGB BLD-MCNC: 11.2 G/DL — LOW (ref 11.5–15.5)
MCHC RBC-ENTMCNC: 28.8 PG — SIGNIFICANT CHANGE UP (ref 27–34)
MCHC RBC-ENTMCNC: 28.8 PG — SIGNIFICANT CHANGE UP (ref 27–34)
MCHC RBC-ENTMCNC: 30.7 GM/DL — LOW (ref 32–36)
MCHC RBC-ENTMCNC: 30.7 GM/DL — LOW (ref 32–36)
MCV RBC AUTO: 93.8 FL — SIGNIFICANT CHANGE UP (ref 80–100)
MCV RBC AUTO: 93.8 FL — SIGNIFICANT CHANGE UP (ref 80–100)
NRBC # BLD: 0 /100 WBCS — SIGNIFICANT CHANGE UP (ref 0–0)
NRBC # BLD: 0 /100 WBCS — SIGNIFICANT CHANGE UP (ref 0–0)
PLATELET # BLD AUTO: 213 K/UL — SIGNIFICANT CHANGE UP (ref 150–400)
PLATELET # BLD AUTO: 213 K/UL — SIGNIFICANT CHANGE UP (ref 150–400)
POTASSIUM SERPL-MCNC: 4.8 MMOL/L — SIGNIFICANT CHANGE UP (ref 3.5–5.3)
POTASSIUM SERPL-MCNC: 4.8 MMOL/L — SIGNIFICANT CHANGE UP (ref 3.5–5.3)
POTASSIUM SERPL-SCNC: 4.8 MMOL/L — SIGNIFICANT CHANGE UP (ref 3.5–5.3)
POTASSIUM SERPL-SCNC: 4.8 MMOL/L — SIGNIFICANT CHANGE UP (ref 3.5–5.3)
RBC # BLD: 3.89 M/UL — SIGNIFICANT CHANGE UP (ref 3.8–5.2)
RBC # BLD: 3.89 M/UL — SIGNIFICANT CHANGE UP (ref 3.8–5.2)
RBC # FLD: 15.6 % — HIGH (ref 10.3–14.5)
RBC # FLD: 15.6 % — HIGH (ref 10.3–14.5)
SODIUM SERPL-SCNC: 139 MMOL/L — SIGNIFICANT CHANGE UP (ref 135–145)
SODIUM SERPL-SCNC: 139 MMOL/L — SIGNIFICANT CHANGE UP (ref 135–145)
WBC # BLD: 9.17 K/UL — SIGNIFICANT CHANGE UP (ref 3.8–10.5)
WBC # BLD: 9.17 K/UL — SIGNIFICANT CHANGE UP (ref 3.8–10.5)
WBC # FLD AUTO: 9.17 K/UL — SIGNIFICANT CHANGE UP (ref 3.8–10.5)
WBC # FLD AUTO: 9.17 K/UL — SIGNIFICANT CHANGE UP (ref 3.8–10.5)

## 2023-11-26 PROCEDURE — 99233 SBSQ HOSP IP/OBS HIGH 50: CPT

## 2023-11-26 RX ORDER — PSYLLIUM SEED (WITH DEXTROSE)
1 POWDER (GRAM) ORAL DAILY
Refills: 0 | Status: DISCONTINUED | OUTPATIENT
Start: 2023-11-26 | End: 2023-12-01

## 2023-11-26 RX ORDER — LACTULOSE 10 G/15ML
10 SOLUTION ORAL
Refills: 0 | Status: DISCONTINUED | OUTPATIENT
Start: 2023-11-26 | End: 2023-11-29

## 2023-11-26 RX ORDER — LACTULOSE 10 G/15ML
10 SOLUTION ORAL ONCE
Refills: 0 | Status: COMPLETED | OUTPATIENT
Start: 2023-11-26 | End: 2023-11-26

## 2023-11-26 RX ADMIN — APIXABAN 5 MILLIGRAM(S): 2.5 TABLET, FILM COATED ORAL at 05:09

## 2023-11-26 RX ADMIN — ATORVASTATIN CALCIUM 80 MILLIGRAM(S): 80 TABLET, FILM COATED ORAL at 23:00

## 2023-11-26 RX ADMIN — BUDESONIDE AND FORMOTEROL FUMARATE DIHYDRATE 2 PUFF(S): 160; 4.5 AEROSOL RESPIRATORY (INHALATION) at 16:49

## 2023-11-26 RX ADMIN — BUDESONIDE AND FORMOTEROL FUMARATE DIHYDRATE 2 PUFF(S): 160; 4.5 AEROSOL RESPIRATORY (INHALATION) at 05:10

## 2023-11-26 RX ADMIN — OXYCODONE HYDROCHLORIDE 5 MILLIGRAM(S): 5 TABLET ORAL at 20:50

## 2023-11-26 RX ADMIN — Medication 40 MILLIGRAM(S): at 05:09

## 2023-11-26 RX ADMIN — Medication 1: at 12:25

## 2023-11-26 RX ADMIN — NYSTATIN CREAM 1 APPLICATION(S): 100000 CREAM TOPICAL at 12:25

## 2023-11-26 RX ADMIN — SENNA PLUS 2 TABLET(S): 8.6 TABLET ORAL at 23:01

## 2023-11-26 RX ADMIN — OXYCODONE HYDROCHLORIDE 5 MILLIGRAM(S): 5 TABLET ORAL at 10:09

## 2023-11-26 RX ADMIN — OXYCODONE HYDROCHLORIDE 60 MILLIGRAM(S): 5 TABLET ORAL at 05:40

## 2023-11-26 RX ADMIN — Medication 1 APPLICATION(S): at 05:10

## 2023-11-26 RX ADMIN — POLYETHYLENE GLYCOL 3350 17 GRAM(S): 17 POWDER, FOR SOLUTION ORAL at 05:09

## 2023-11-26 RX ADMIN — NYSTATIN CREAM 1 APPLICATION(S): 100000 CREAM TOPICAL at 23:01

## 2023-11-26 RX ADMIN — LACTULOSE 10 GRAM(S): 10 SOLUTION ORAL at 16:48

## 2023-11-26 RX ADMIN — Medication 10 UNIT(S): at 12:25

## 2023-11-26 RX ADMIN — INSULIN GLARGINE 25 UNIT(S): 100 INJECTION, SOLUTION SUBCUTANEOUS at 23:07

## 2023-11-26 RX ADMIN — OXYCODONE HYDROCHLORIDE 5 MILLIGRAM(S): 5 TABLET ORAL at 11:00

## 2023-11-26 RX ADMIN — OXYCODONE HYDROCHLORIDE 60 MILLIGRAM(S): 5 TABLET ORAL at 05:10

## 2023-11-26 RX ADMIN — OXYCODONE HYDROCHLORIDE 60 MILLIGRAM(S): 5 TABLET ORAL at 17:30

## 2023-11-26 RX ADMIN — Medication 1: at 16:53

## 2023-11-26 RX ADMIN — APIXABAN 5 MILLIGRAM(S): 2.5 TABLET, FILM COATED ORAL at 16:47

## 2023-11-26 RX ADMIN — Medication 1 APPLICATION(S): at 16:50

## 2023-11-26 RX ADMIN — OXYCODONE HYDROCHLORIDE 5 MILLIGRAM(S): 5 TABLET ORAL at 19:46

## 2023-11-26 RX ADMIN — OXYCODONE HYDROCHLORIDE 60 MILLIGRAM(S): 5 TABLET ORAL at 16:46

## 2023-11-26 RX ADMIN — Medication 10 UNIT(S): at 16:53

## 2023-11-26 RX ADMIN — NYSTATIN CREAM 1 APPLICATION(S): 100000 CREAM TOPICAL at 05:10

## 2023-11-26 RX ADMIN — Medication 10 UNIT(S): at 08:16

## 2023-11-26 NOTE — PROGRESS NOTE ADULT - ASSESSMENT
69yo F w/ PMH of HTN, HLD, DM2, HFpEF, CKD III, DVT, Uterine CA and recent hospitalizations including at [facility] for rectal pain i/s/o likely anal fissures pw rectal pain w/ defecation likely i/s/o anal fissures.

## 2023-11-26 NOTE — PROGRESS NOTE ADULT - SUBJECTIVE AND OBJECTIVE BOX
Andre Reyes, M.D.  Pager: [phone]  Office: [phone]    Patient is a 68y old  Female who presents with a chief complaint of Rectal pain (25 Nov 2023 13:17)          SUBJECTIVE / OVERNIGHT EVENTS:    No acute overnight events.    ROS: ( - ) Fever, ( - )Chills,  ( - )Nausea/Vomiting, ( - ) Cough, ( - )Shortness of breath, ( - )Chest Pain    MEDICATIONS  (STANDING):  apixaban 5 milliGRAM(s) Oral every 12 hours  atorvastatin 80 milliGRAM(s) Oral at bedtime  budesonide 160 MICROgram(s)/formoterol 4.5 MICROgram(s) Inhaler 2 Puff(s) Inhalation two times a day  dextrose 5%. 1000 milliLiter(s) (50 mL/Hr) IV Continuous <Continuous>  dextrose 5%. 1000 milliLiter(s) (100 mL/Hr) IV Continuous <Continuous>  dextrose 50% Injectable 25 Gram(s) IV Push once  dextrose 50% Injectable 12.5 Gram(s) IV Push once  dextrose 50% Injectable 25 Gram(s) IV Push once  furosemide    Tablet 40 milliGRAM(s) Oral daily  glucagon  Injectable 1 milliGRAM(s) IntraMuscular once  insulin glargine Injectable (LANTUS) 25 Unit(s) SubCutaneous at bedtime  insulin lispro (ADMELOG) corrective regimen sliding scale   SubCutaneous at bedtime  insulin lispro (ADMELOG) corrective regimen sliding scale   SubCutaneous three times a day before meals  insulin lispro Injectable (ADMELOG) 10 Unit(s) SubCutaneous three times a day before meals  lidocaine 4% Cream 1 Application(s) Topical once  nitroglycerin  0.2% Ointment Compound 1 Application(s) Rectal two times a day  nystatin Powder 1 Application(s) Topical three times a day  oxyCODONE  ER Tablet 60 milliGRAM(s) Oral every 12 hours  polyethylene glycol 3350 17 Gram(s) Oral two times a day  senna 2 Tablet(s) Oral at bedtime    MEDICATIONS  (PRN):  acetaminophen     Tablet .. 650 milliGRAM(s) Oral every 6 hours PRN Temp greater or equal to 38C (100.4F), Mild Pain (1 - 3)  albuterol    90 MICROgram(s) HFA Inhaler 2 Puff(s) Inhalation every 6 hours PRN Shortness of Breath and/or Wheezing  dextrose Oral Gel 15 Gram(s) Oral once PRN Blood Glucose LESS THAN 70 milliGRAM(s)/deciliter  oxyCODONE    IR 5 milliGRAM(s) Oral every 6 hours PRN Moderate Pain (4 - 6)          T(C): 36.6 (11-26 @ 09:19), Max: 36.6 (11-25 @ 13:58)   HR: 69   BP: 109/55   RR: 18   SpO2: 95%    PHYSICAL EXAM:    CONSTITUTIONAL: NAD, well-developed, well-groomed  EYES: PERRLA; conjunctiva and sclera clear  ENMT: Moist oral mucosa, no pharyngeal injection or exudates; normal dentition  NECK: Supple, no palpable masses; no thyromegaly  RESPIRATORY: Normal respiratory effort; lungs are clear to auscultation bilaterally  CARDIOVASCULAR: Regular rate and rhythm, normal S1 and S2, no murmur/rub/gallop; No lower extremity edema; Peripheral pulses are 2+ bilaterally  ABDOMEN: Nontender to palpation, normoactive bowel sounds, no rebound/guarding; No hepatosplenomegaly  MUSCULOSKELETAL:  no clubbing or cyanosis of digits; no joint swelling or tenderness to palpation  PSYCH: A+O to person, place, and time; affect appropriate  NEUROLOGY: CN 2-12 are intact and symmetric; no gross sensory deficits   SKIN: No rashes; no palpable lesions      LABS:                        11.2   9.17  )-----------( 213      ( 26 Nov 2023 09:03 )             36.5      11-25    139  |  103  |  41<H>  ----------------------------<  108<H>  5.1   |  27  |  2.09<H>    Ca    9.6      25 Nov 2023 09:00  Phos  4.2     11-25  Mg     2.3     11-25    TPro  6.6  /  Alb  3.6  /  TBili  0.7  /  DBili  x   /  AST  20  /  ALT  19  /  AlkPhos  155<H>  11-25       CAPILLARY BLOOD GLUCOSE      POCT Blood Glucose.: 145 mg/dL (26 Nov 2023 08:08)  POCT Blood Glucose.: 158 mg/dL (25 Nov 2023 21:52)  POCT Blood Glucose.: 201 mg/dL (25 Nov 2023 17:44)  POCT Blood Glucose.: 208 mg/dL (25 Nov 2023 11:40)      RADIOLOGY & ADDITIONAL TESTS:    Imaging Personally Reviewed:  Consultant(s) Notes Reviewed:    Care Discussed with Consultants/Other Providers:   Andre Reyes, M.D.  Pager: [phone]  Office: [phone]    Patient is a 68y old  Female who presents with a chief complaint of Rectal pain (25 Nov 2023 13:17)          SUBJECTIVE / OVERNIGHT EVENTS:    No acute overnight events.  c/w pain in the rectum  feels like having a bowel movement but not having a significant bowel movement    ROS: ( - ) Fever, ( - )Chills,  ( - )Nausea/Vomiting, ( - ) Cough, ( - )Shortness of breath, ( - )Chest Pain    MEDICATIONS  (STANDING):  apixaban 5 milliGRAM(s) Oral every 12 hours  atorvastatin 80 milliGRAM(s) Oral at bedtime  budesonide 160 MICROgram(s)/formoterol 4.5 MICROgram(s) Inhaler 2 Puff(s) Inhalation two times a day  dextrose 5%. 1000 milliLiter(s) (50 mL/Hr) IV Continuous <Continuous>  dextrose 5%. 1000 milliLiter(s) (100 mL/Hr) IV Continuous <Continuous>  dextrose 50% Injectable 25 Gram(s) IV Push once  dextrose 50% Injectable 12.5 Gram(s) IV Push once  dextrose 50% Injectable 25 Gram(s) IV Push once  furosemide    Tablet 40 milliGRAM(s) Oral daily  glucagon  Injectable 1 milliGRAM(s) IntraMuscular once  insulin glargine Injectable (LANTUS) 25 Unit(s) SubCutaneous at bedtime  insulin lispro (ADMELOG) corrective regimen sliding scale   SubCutaneous at bedtime  insulin lispro (ADMELOG) corrective regimen sliding scale   SubCutaneous three times a day before meals  insulin lispro Injectable (ADMELOG) 10 Unit(s) SubCutaneous three times a day before meals  lidocaine 4% Cream 1 Application(s) Topical once  nitroglycerin  0.2% Ointment Compound 1 Application(s) Rectal two times a day  nystatin Powder 1 Application(s) Topical three times a day  oxyCODONE  ER Tablet 60 milliGRAM(s) Oral every 12 hours  polyethylene glycol 3350 17 Gram(s) Oral two times a day  senna 2 Tablet(s) Oral at bedtime    MEDICATIONS  (PRN):  acetaminophen     Tablet .. 650 milliGRAM(s) Oral every 6 hours PRN Temp greater or equal to 38C (100.4F), Mild Pain (1 - 3)  albuterol    90 MICROgram(s) HFA Inhaler 2 Puff(s) Inhalation every 6 hours PRN Shortness of Breath and/or Wheezing  dextrose Oral Gel 15 Gram(s) Oral once PRN Blood Glucose LESS THAN 70 milliGRAM(s)/deciliter  oxyCODONE    IR 5 milliGRAM(s) Oral every 6 hours PRN Moderate Pain (4 - 6)          T(C): 36.6 (11-26 @ 09:19), Max: 36.6 (11-25 @ 13:58)   HR: 69   BP: 109/55   RR: 18   SpO2: 95%    PHYSICAL EXAM:    CONSTITUTIONAL: NAD, well-developed, well-groomed  EYES: PERRLA; conjunctiva and sclera clear  ENMT: Moist oral mucosa, no pharyngeal injection or exudates; normal dentition  NECK: Supple, no palpable masses; no thyromegaly  RESPIRATORY: Normal respiratory effort; lungs are clear to auscultation bilaterally  CARDIOVASCULAR: Regular rate and rhythm, normal S1 and S2, no murmur/rub/gallop; No lower extremity edema; Peripheral pulses are 2+ bilaterally  ABDOMEN: Nontender to palpation, normoactive bowel sounds, no rebound/guarding; No hepatosplenomegaly  MUSCULOSKELETAL:  no clubbing or cyanosis of digits; no joint swelling or tenderness to palpation  PSYCH: A+O to person, place, and time; affect appropriate  NEUROLOGY: CN 2-12 are intact and symmetric; no gross sensory deficits   SKIN: No rashes; no palpable lesions      LABS:                        11.2   9.17  )-----------( 213      ( 26 Nov 2023 09:03 )             36.5      11-25    139  |  103  |  41<H>  ----------------------------<  108<H>  5.1   |  27  |  2.09<H>    Ca    9.6      25 Nov 2023 09:00  Phos  4.2     11-25  Mg     2.3     11-25    TPro  6.6  /  Alb  3.6  /  TBili  0.7  /  DBili  x   /  AST  20  /  ALT  19  /  AlkPhos  155<H>  11-25       CAPILLARY BLOOD GLUCOSE      POCT Blood Glucose.: 145 mg/dL (26 Nov 2023 08:08)  POCT Blood Glucose.: 158 mg/dL (25 Nov 2023 21:52)  POCT Blood Glucose.: 201 mg/dL (25 Nov 2023 17:44)  POCT Blood Glucose.: 208 mg/dL (25 Nov 2023 11:40)      RADIOLOGY & ADDITIONAL TESTS:    Imaging Personally Reviewed:  Consultant(s) Notes Reviewed:    Care Discussed with Consultants/Other Providers:

## 2023-11-27 LAB
GLUCOSE BLDC GLUCOMTR-MCNC: 164 MG/DL — HIGH (ref 70–99)
GLUCOSE BLDC GLUCOMTR-MCNC: 164 MG/DL — HIGH (ref 70–99)
GLUCOSE BLDC GLUCOMTR-MCNC: 166 MG/DL — HIGH (ref 70–99)
GLUCOSE BLDC GLUCOMTR-MCNC: 166 MG/DL — HIGH (ref 70–99)
GLUCOSE BLDC GLUCOMTR-MCNC: 170 MG/DL — HIGH (ref 70–99)
GLUCOSE BLDC GLUCOMTR-MCNC: 170 MG/DL — HIGH (ref 70–99)
GLUCOSE BLDC GLUCOMTR-MCNC: 194 MG/DL — HIGH (ref 70–99)
GLUCOSE BLDC GLUCOMTR-MCNC: 194 MG/DL — HIGH (ref 70–99)

## 2023-11-27 PROCEDURE — 99232 SBSQ HOSP IP/OBS MODERATE 35: CPT

## 2023-11-27 RX ADMIN — ATORVASTATIN CALCIUM 80 MILLIGRAM(S): 80 TABLET, FILM COATED ORAL at 21:55

## 2023-11-27 RX ADMIN — Medication 1 APPLICATION(S): at 07:21

## 2023-11-27 RX ADMIN — Medication 40 MILLIGRAM(S): at 06:10

## 2023-11-27 RX ADMIN — INSULIN GLARGINE 25 UNIT(S): 100 INJECTION, SOLUTION SUBCUTANEOUS at 21:54

## 2023-11-27 RX ADMIN — Medication 10 UNIT(S): at 17:46

## 2023-11-27 RX ADMIN — Medication 1: at 07:37

## 2023-11-27 RX ADMIN — BUDESONIDE AND FORMOTEROL FUMARATE DIHYDRATE 2 PUFF(S): 160; 4.5 AEROSOL RESPIRATORY (INHALATION) at 06:11

## 2023-11-27 RX ADMIN — Medication 1: at 17:46

## 2023-11-27 RX ADMIN — BUDESONIDE AND FORMOTEROL FUMARATE DIHYDRATE 2 PUFF(S): 160; 4.5 AEROSOL RESPIRATORY (INHALATION) at 17:35

## 2023-11-27 RX ADMIN — POLYETHYLENE GLYCOL 3350 17 GRAM(S): 17 POWDER, FOR SOLUTION ORAL at 17:35

## 2023-11-27 RX ADMIN — OXYCODONE HYDROCHLORIDE 5 MILLIGRAM(S): 5 TABLET ORAL at 21:54

## 2023-11-27 RX ADMIN — Medication 1: at 12:37

## 2023-11-27 RX ADMIN — Medication 10 UNIT(S): at 07:37

## 2023-11-27 RX ADMIN — OXYCODONE HYDROCHLORIDE 60 MILLIGRAM(S): 5 TABLET ORAL at 06:09

## 2023-11-27 RX ADMIN — NYSTATIN CREAM 1 APPLICATION(S): 100000 CREAM TOPICAL at 07:21

## 2023-11-27 RX ADMIN — APIXABAN 5 MILLIGRAM(S): 2.5 TABLET, FILM COATED ORAL at 06:09

## 2023-11-27 RX ADMIN — SENNA PLUS 2 TABLET(S): 8.6 TABLET ORAL at 21:54

## 2023-11-27 RX ADMIN — NYSTATIN CREAM 1 APPLICATION(S): 100000 CREAM TOPICAL at 13:47

## 2023-11-27 RX ADMIN — OXYCODONE HYDROCHLORIDE 5 MILLIGRAM(S): 5 TABLET ORAL at 08:47

## 2023-11-27 RX ADMIN — OXYCODONE HYDROCHLORIDE 5 MILLIGRAM(S): 5 TABLET ORAL at 09:17

## 2023-11-27 RX ADMIN — OXYCODONE HYDROCHLORIDE 60 MILLIGRAM(S): 5 TABLET ORAL at 18:05

## 2023-11-27 RX ADMIN — POLYETHYLENE GLYCOL 3350 17 GRAM(S): 17 POWDER, FOR SOLUTION ORAL at 06:10

## 2023-11-27 RX ADMIN — Medication 1 APPLICATION(S): at 17:36

## 2023-11-27 RX ADMIN — Medication 1 PACKET(S): at 12:15

## 2023-11-27 RX ADMIN — OXYCODONE HYDROCHLORIDE 5 MILLIGRAM(S): 5 TABLET ORAL at 22:24

## 2023-11-27 RX ADMIN — APIXABAN 5 MILLIGRAM(S): 2.5 TABLET, FILM COATED ORAL at 17:35

## 2023-11-27 RX ADMIN — NYSTATIN CREAM 1 APPLICATION(S): 100000 CREAM TOPICAL at 21:55

## 2023-11-27 RX ADMIN — Medication 10 UNIT(S): at 12:38

## 2023-11-27 NOTE — DIETITIAN INITIAL EVALUATION ADULT - NSFNSADHERENCEPTAFT_GEN_A_CORE
Pt with T2DM, reports being compliant with insulin regimen. Per H&P pt takes insulin glargine, NovoLOG FlexPen. HbA1c: 7.6% (11/25)

## 2023-11-27 NOTE — DIETITIAN INITIAL EVALUATION ADULT - ADD RECOMMEND
1) Continue current diet as tolerated. 2) Diet education provided, reinforce as needed. 3) BMI > 40kg/m2 alert placed in EMR

## 2023-11-27 NOTE — DIETITIAN INITIAL EVALUATION ADULT - REASON FOR ADMISSION
Anal fissure    Chart reviewed, events noted. This is a "69yo F w/ PMH of HTN, HLD, DM2, HFpEF, CKD III, DVT, Uterine CA and recent hospitalizations including at [facility] for rectal pain i/s/o likely anal fissures pw rectal pain w/ defecation."

## 2023-11-27 NOTE — DIETITIAN INITIAL EVALUATION ADULT - OTHER INFO
Weight: Pt reports intentional 100lbs weight loss x ~ 1 month through dietary changes. Large weight fluctuations noted per Rishabh ALEX, 380lbs (10/12/22), 261lbs (9/14/23), 436lbs (9/17/23), 350lbs (9/29/23). Current dosing weight is 369lbs ---suggesting weight gain, will continue to monitor. Of note pt with +2 generalized edema. Pt reports limit mobility has been making it harder for her lose weight

## 2023-11-27 NOTE — CONSULT NOTE ADULT - ASSESSMENT
Patient visited at bedside INAD for right ankle new wound. patient relates left foot wound healed  LOWER EXTREMITY PHYSICAL EXAM:    Vascular: DP/PT 0/4, B/L, CFT <3 seconds B/L, Temperature gradient wnl, B/L.   Neuro: Epicritic sensation intact to the level of toes, B/L.  Musculoskeletal/Ortho: Patient relates noticing a new wound anterior right ankle area with no signs of infection and no signs of cellulitis  Rx betadine with gauze and yasir right ankle  reconsult podiatry as needed

## 2023-11-27 NOTE — DIETITIAN INITIAL EVALUATION ADULT - PERTINENT LABORATORY DATA
11-26    139  |  100  |  45<H>  ----------------------------<  148<H>  4.8   |  27  |  2.01<H>    Ca    9.5      26 Nov 2023 09:03    POCT Blood Glucose.: 166 mg/dL (11-27-23 @ 07:32)  A1C with Estimated Average Glucose Result: 7.6 % (11-25-23 @ 09:00)  A1C with Estimated Average Glucose Result: 7.2 % (09-17-23 @ 16:13)  A1C with Estimated Average Glucose Result: 6.5 % (08-31-23 @ 10:25)

## 2023-11-27 NOTE — DIETITIAN INITIAL EVALUATION ADULT - FACTORS AFF FOOD INTAKE
relies on son to grocery shop, cook. Son lives with patient/difficulty with food procurement/preparation

## 2023-11-27 NOTE — CONSULT NOTE ADULT - SUBJECTIVE AND OBJECTIVE BOX
Patient is a 68y old  Female who presents with a chief complaint of Anal fissure    Chart reviewed, events noted. This is a "67yo F w/ PMH of HTN, HLD, DM2, HFpEF, CKD III, DVT, Uterine CA and recent hospitalizations including at [facility] for rectal pain i/s/o likely anal fissures pw rectal pain w/ defecation." (27 Nov 2023 10:28)      HPI:  Pt is a 67yo F w/ PMH of HTN, HLD, DM2, HFpEF, CKD III, DVT, Uterine CA and recent hospitalizations including at [facility] for rectal pain i/s/o likely anal fissures pw rectal pain w/ defecation.     Reports 1 day of increased rectal pain that's worse w/ passing of flatus or w/ BMs. Denies any hematochezia, melena, abd pain. She does report not being able to complete sitz baths at home and has recently been discharged from rehab and unable to take care of her ADLs at home due to limited mobility.    In the ED VSS on chronic 4L NC w/ labs non-actionable and CT A/P w/out acute findings. She was administered Morphine 4mg x3, Lidocaine cream for anal fissure.   (25 Nov 2023 00:58)      PAST MEDICAL & SURGICAL HISTORY:  Diabetes Mellitus Type II      HTN (Hypertension)      Endometrial Hyperplasia      Cervical Stenosis of Spine      Spinal Stenosis, Lumbar      Deep Vein Thrombosis (DVT)  Left leg, 2004, treated and resolved      Dyslipidemia      Cataract      Morbid Obesity      Vitamin D deficiency      Insomnia      CKD (chronic kidney disease)  ~ III      Congestive heart failure  ~ HFpEF      Uterine cancer      Asthma      On home oxygen therapy      Gait difficulty  ~ u ses walker      Cataract extracted with lens implant 1998  Right      C Section 1994      Cholecystectomy/appendectomy @ age 25      D&C x2 1980's      D&C 2008  hysteroscopy, endometrial hyperplasia, 2009      Cervical Spinal Stenosis surgery x2 (01/2002, 7/2002)      Tonsillectomy as a child      Endometrial biopsy 12/02/09      H/O laser iridotomy  left eye, 2016      H/O colonoscopy  1998      S/P total abdominal hysterectomy and bilateral salpingo-oophorectomy      S/P appendectomy      S/P cholecystectomy          MEDICATIONS  (STANDING):  apixaban 5 milliGRAM(s) Oral every 12 hours  atorvastatin 80 milliGRAM(s) Oral at bedtime  budesonide 160 MICROgram(s)/formoterol 4.5 MICROgram(s) Inhaler 2 Puff(s) Inhalation two times a day  dextrose 5%. 1000 milliLiter(s) (50 mL/Hr) IV Continuous <Continuous>  dextrose 5%. 1000 milliLiter(s) (100 mL/Hr) IV Continuous <Continuous>  dextrose 50% Injectable 25 Gram(s) IV Push once  dextrose 50% Injectable 25 Gram(s) IV Push once  dextrose 50% Injectable 12.5 Gram(s) IV Push once  furosemide    Tablet 40 milliGRAM(s) Oral daily  glucagon  Injectable 1 milliGRAM(s) IntraMuscular once  insulin glargine Injectable (LANTUS) 25 Unit(s) SubCutaneous at bedtime  insulin lispro (ADMELOG) corrective regimen sliding scale   SubCutaneous at bedtime  insulin lispro (ADMELOG) corrective regimen sliding scale   SubCutaneous three times a day before meals  insulin lispro Injectable (ADMELOG) 10 Unit(s) SubCutaneous three times a day before meals  lidocaine 4% Cream 1 Application(s) Topical once  nitroglycerin  0.2% Ointment Compound 1 Application(s) Rectal two times a day  nystatin Powder 1 Application(s) Topical three times a day  oxyCODONE  ER Tablet 60 milliGRAM(s) Oral every 12 hours  polyethylene glycol 3350 17 Gram(s) Oral two times a day  psyllium Powder 1 Packet(s) Oral daily  senna 2 Tablet(s) Oral at bedtime    MEDICATIONS  (PRN):  acetaminophen     Tablet .. 650 milliGRAM(s) Oral every 6 hours PRN Temp greater or equal to 38C (100.4F), Mild Pain (1 - 3)  albuterol    90 MICROgram(s) HFA Inhaler 2 Puff(s) Inhalation every 6 hours PRN Shortness of Breath and/or Wheezing  dextrose Oral Gel 15 Gram(s) Oral once PRN Blood Glucose LESS THAN 70 milliGRAM(s)/deciliter  lactulose Syrup 10 Gram(s) Oral two times a day PRN constipation  oxyCODONE    IR 5 milliGRAM(s) Oral every 6 hours PRN Moderate Pain (4 - 6)      Allergies    adhesives (Rash)  latex (Rash)  Bactrim (Flushing)  wool- rash, itch (Other)    Intolerances        VITALS:    Vital Signs Last 24 Hrs  T(C): 37.1 (27 Nov 2023 12:54), Max: 37.1 (27 Nov 2023 12:54)  T(F): 98.8 (27 Nov 2023 12:54), Max: 98.8 (27 Nov 2023 12:54)  HR: 69 (27 Nov 2023 12:54) (66 - 78)  BP: 132/53 (27 Nov 2023 12:54) (112/64 - 132/53)  BP(mean): --  RR: 18 (27 Nov 2023 12:54) (18 - 18)  SpO2: 95% (27 Nov 2023 12:54) (95% - 98%)    Parameters below as of 27 Nov 2023 12:54  Patient On (Oxygen Delivery Method): nasal cannula  O2 Flow (L/min): 3      LABS:                          11.2   9.17  )-----------( 213      ( 26 Nov 2023 09:03 )             36.5       11-26    139  |  100  |  45<H>  ----------------------------<  148<H>  4.8   |  27  |  2.01<H>    Ca    9.5      26 Nov 2023 09:03        CAPILLARY BLOOD GLUCOSE      POCT Blood Glucose.: 170 mg/dL (27 Nov 2023 12:29)  POCT Blood Glucose.: 166 mg/dL (27 Nov 2023 07:32)  POCT Blood Glucose.: 231 mg/dL (26 Nov 2023 23:01)  POCT Blood Glucose.: 194 mg/dL (26 Nov 2023 16:52)          LOWER EXTREMITY PHYSICAL EXAM:    Vascular: DP/PT 0/4, B/L, CFT <3 seconds B/L, Temperature gradient wnl, B/L.   Neuro: Epicritic sensation intact to the level of toes, B/L.  Musculoskeletal/Ortho: Patient relates noticing a new wound anterior right ankle area with no signs of infection and no signs of cellulitis

## 2023-11-27 NOTE — PROGRESS NOTE ADULT - ASSESSMENT
67yo F w/ PMH of HTN, HLD, DM2, HFpEF, CKD III, DVT, Uterine CA and recent hospitalizations including at [facility] for rectal pain i/s/o likely anal fissures pw rectal pain w/ defecation likely i/s/o anal fissures.

## 2023-11-27 NOTE — DIETITIAN INITIAL EVALUATION ADULT - ENERGY INTAKE
In-house pt reports appetite and PO intake remain intact. No acute GI distress noted. Pt receptive to diet education.  Adequate (%)

## 2023-11-27 NOTE — DIETITIAN INITIAL EVALUATION ADULT - PERTINENT MEDS FT
MEDICATIONS  (STANDING):  apixaban 5 milliGRAM(s) Oral every 12 hours  atorvastatin 80 milliGRAM(s) Oral at bedtime  budesonide 160 MICROgram(s)/formoterol 4.5 MICROgram(s) Inhaler 2 Puff(s) Inhalation two times a day  dextrose 5%. 1000 milliLiter(s) (50 mL/Hr) IV Continuous <Continuous>  dextrose 5%. 1000 milliLiter(s) (100 mL/Hr) IV Continuous <Continuous>  dextrose 50% Injectable 25 Gram(s) IV Push once  dextrose 50% Injectable 12.5 Gram(s) IV Push once  dextrose 50% Injectable 25 Gram(s) IV Push once  furosemide    Tablet 40 milliGRAM(s) Oral daily  glucagon  Injectable 1 milliGRAM(s) IntraMuscular once  insulin glargine Injectable (LANTUS) 25 Unit(s) SubCutaneous at bedtime  insulin lispro (ADMELOG) corrective regimen sliding scale   SubCutaneous three times a day before meals  insulin lispro (ADMELOG) corrective regimen sliding scale   SubCutaneous at bedtime  insulin lispro Injectable (ADMELOG) 10 Unit(s) SubCutaneous three times a day before meals  lidocaine 4% Cream 1 Application(s) Topical once  nitroglycerin  0.2% Ointment Compound 1 Application(s) Rectal two times a day  nystatin Powder 1 Application(s) Topical three times a day  oxyCODONE  ER Tablet 60 milliGRAM(s) Oral every 12 hours  polyethylene glycol 3350 17 Gram(s) Oral two times a day  psyllium Powder 1 Packet(s) Oral daily  senna 2 Tablet(s) Oral at bedtime    MEDICATIONS  (PRN):  acetaminophen     Tablet .. 650 milliGRAM(s) Oral every 6 hours PRN Temp greater or equal to 38C (100.4F), Mild Pain (1 - 3)  albuterol    90 MICROgram(s) HFA Inhaler 2 Puff(s) Inhalation every 6 hours PRN Shortness of Breath and/or Wheezing  dextrose Oral Gel 15 Gram(s) Oral once PRN Blood Glucose LESS THAN 70 milliGRAM(s)/deciliter  lactulose Syrup 10 Gram(s) Oral two times a day PRN constipation  oxyCODONE    IR 5 milliGRAM(s) Oral every 6 hours PRN Moderate Pain (4 - 6)

## 2023-11-27 NOTE — DIETITIAN INITIAL EVALUATION ADULT - ORAL INTAKE PTA/DIET HISTORY
Pt reports good PO intake and appetite PTA, consumes 3 meals per day. Reports she has been trying to eat healthier that last few months, cutting down on portions sizes and drinking water before meals. Limits intake of high sodium foods. NKFA. Pt denies chewing/swallowing difficulty, nausea, vomiting, diarrhea, constipation.

## 2023-11-27 NOTE — DIETITIAN INITIAL EVALUATION ADULT - NS FNS DIET ORDER
Diet, DASH/TLC:   Sodium & Cholesterol Restricted  Consistent Carbohydrate {No Snacks} (CSTCHO) (11-25-23 @ 00:48) [Active]

## 2023-11-27 NOTE — PROGRESS NOTE ADULT - SUBJECTIVE AND OBJECTIVE BOX
[facility] Division of Hospital Medicine  John Ye MD  Available via MS Teams  Pager: [phone]    SUBJECTIVE / OVERNIGHT EVENTS: Patient seen and examined at bedside. No acute overnight events. Reports pain with defecation, states it is well controlled with Oxy. Reports insomnia, states Lorazepam worked at night.      ADDITIONAL REVIEW OF SYSTEMS: +insomnia     MEDICATIONS  (STANDING):  apixaban 5 milliGRAM(s) Oral every 12 hours  atorvastatin 80 milliGRAM(s) Oral at bedtime  budesonide 160 MICROgram(s)/formoterol 4.5 MICROgram(s) Inhaler 2 Puff(s) Inhalation two times a day  dextrose 5%. 1000 milliLiter(s) (50 mL/Hr) IV Continuous <Continuous>  dextrose 5%. 1000 milliLiter(s) (100 mL/Hr) IV Continuous <Continuous>  dextrose 50% Injectable 25 Gram(s) IV Push once  dextrose 50% Injectable 12.5 Gram(s) IV Push once  dextrose 50% Injectable 25 Gram(s) IV Push once  furosemide    Tablet 40 milliGRAM(s) Oral daily  glucagon  Injectable 1 milliGRAM(s) IntraMuscular once  insulin glargine Injectable (LANTUS) 25 Unit(s) SubCutaneous at bedtime  insulin lispro (ADMELOG) corrective regimen sliding scale   SubCutaneous three times a day before meals  insulin lispro (ADMELOG) corrective regimen sliding scale   SubCutaneous at bedtime  insulin lispro Injectable (ADMELOG) 10 Unit(s) SubCutaneous three times a day before meals  lidocaine 4% Cream 1 Application(s) Topical once  nitroglycerin  0.2% Ointment Compound 1 Application(s) Rectal two times a day  nystatin Powder 1 Application(s) Topical three times a day  oxyCODONE  ER Tablet 60 milliGRAM(s) Oral every 12 hours  polyethylene glycol 3350 17 Gram(s) Oral two times a day  psyllium Powder 1 Packet(s) Oral daily  senna 2 Tablet(s) Oral at bedtime    MEDICATIONS  (PRN):  acetaminophen     Tablet .. 650 milliGRAM(s) Oral every 6 hours PRN Temp greater or equal to 38C (100.4F), Mild Pain (1 - 3)  albuterol    90 MICROgram(s) HFA Inhaler 2 Puff(s) Inhalation every 6 hours PRN Shortness of Breath and/or Wheezing  dextrose Oral Gel 15 Gram(s) Oral once PRN Blood Glucose LESS THAN 70 milliGRAM(s)/deciliter  lactulose Syrup 10 Gram(s) Oral two times a day PRN constipation  LORazepam     Tablet 0.5 milliGRAM(s) Oral at bedtime PRN Insomnia  oxyCODONE    IR 5 milliGRAM(s) Oral every 6 hours PRN Moderate Pain (4 - 6)      I&O's Summary    26 Nov 2023 07:01  -  27 Nov 2023 07:00  --------------------------------------------------------  IN: 480 mL / OUT: 1700 mL / NET: -1220 mL    27 Nov 2023 07:01  -  27 Nov 2023 15:39  --------------------------------------------------------  IN: 480 mL / OUT: 500 mL / NET: -20 mL        PHYSICAL EXAM:  Vital Signs Last 24 Hrs  T(C): 37.1 (27 Nov 2023 12:54), Max: 37.1 (27 Nov 2023 12:54)  T(F): 98.8 (27 Nov 2023 12:54), Max: 98.8 (27 Nov 2023 12:54)  HR: 69 (27 Nov 2023 12:54) (66 - 78)  BP: 132/53 (27 Nov 2023 12:54) (112/64 - 132/53)  BP(mean): --  RR: 18 (27 Nov 2023 12:54) (18 - 18)  SpO2: 95% (27 Nov 2023 12:54) (95% - 98%)    Parameters below as of 27 Nov 2023 12:54  Patient On (Oxygen Delivery Method): nasal cannula  O2 Flow (L/min): 3    CONSTITUTIONAL: NAD, well-developed, well-groomed  EYES: PERRLA; conjunctiva and sclera clear  ENMT: Moist oral mucosa, no pharyngeal injection or exudates; normal dentition  NECK: Supple, no palpable masses; no thyromegaly  RESPIRATORY: Normal respiratory effort; lungs are clear to auscultation bilaterally  CARDIOVASCULAR: Regular rate and rhythm, normal S1 and S2, no murmur/rub/gallop; No lower extremity edema; Peripheral pulses are 2+ bilaterally  ABDOMEN: Nontender to palpation, normoactive bowel sounds, no rebound/guarding; No hepatosplenomegaly  MUSCULOSKELETAL:   no clubbing or cyanosis of digits; no joint swelling or tenderness to palpation  PSYCH: A+O to person, place, and time; affect appropriate  NEUROLOGY: CN 2-12 are intact and symmetric; no gross sensory deficits   SKIN: No rashes; no palpable lesions    LABS:                        11.2   9.17  )-----------( 213      ( 26 Nov 2023 09:03 )             36.5     11-26    139  |  100  |  45<H>  ----------------------------<  148<H>  4.8   |  27  |  2.01<H>    Ca    9.5      26 Nov 2023 09:03            Urinalysis Basic - ( 26 Nov 2023 09:03 )    Color: x / Appearance: x / SG: x / pH: x  Gluc: 148 mg/dL / Ketone: x  / Bili: x / Urobili: x   Blood: x / Protein: x / Nitrite: x   Leuk Esterase: x / RBC: x / WBC x   Sq Epi: x / Non Sq Epi: x / Bacteria: x        COVID-19 PCR: NotDetec (30 Sep 2023 08:22)  COVID-19 PCR: NotDetec (12 Sep 2023 13:00)  SARS-CoV-2: NotDetec (30 Aug 2023 20:30)      RADIOLOGY & ADDITIONAL TESTS:  New Results Reviewed Today:   New Imaging Personally Reviewed Today:  New Electrocardiogram Personally Reviewed Today:  Prior or Outpatient Records Reviewed Today:    COMMUNICATION:  Care Discussed with Consultants/Other Providers and Details of Discussion:  Discussions with Patient/Family:  PCP Communication:

## 2023-11-28 ENCOUNTER — TRANSCRIPTION ENCOUNTER (OUTPATIENT)
Age: 68
End: 2023-11-28

## 2023-11-28 LAB
GLUCOSE BLDC GLUCOMTR-MCNC: 146 MG/DL — HIGH (ref 70–99)
GLUCOSE BLDC GLUCOMTR-MCNC: 146 MG/DL — HIGH (ref 70–99)
GLUCOSE BLDC GLUCOMTR-MCNC: 164 MG/DL — HIGH (ref 70–99)
GLUCOSE BLDC GLUCOMTR-MCNC: 164 MG/DL — HIGH (ref 70–99)
GLUCOSE BLDC GLUCOMTR-MCNC: 174 MG/DL — HIGH (ref 70–99)
GLUCOSE BLDC GLUCOMTR-MCNC: 174 MG/DL — HIGH (ref 70–99)
GLUCOSE BLDC GLUCOMTR-MCNC: 182 MG/DL — HIGH (ref 70–99)
GLUCOSE BLDC GLUCOMTR-MCNC: 182 MG/DL — HIGH (ref 70–99)

## 2023-11-28 PROCEDURE — 99232 SBSQ HOSP IP/OBS MODERATE 35: CPT

## 2023-11-28 RX ORDER — DIPHENHYDRAMINE HCL 50 MG
25 CAPSULE ORAL EVERY 12 HOURS
Refills: 0 | Status: DISCONTINUED | OUTPATIENT
Start: 2023-11-28 | End: 2023-11-29

## 2023-11-28 RX ADMIN — SENNA PLUS 2 TABLET(S): 8.6 TABLET ORAL at 21:37

## 2023-11-28 RX ADMIN — Medication 1 APPLICATION(S): at 17:25

## 2023-11-28 RX ADMIN — Medication 1: at 12:24

## 2023-11-28 RX ADMIN — NYSTATIN CREAM 1 APPLICATION(S): 100000 CREAM TOPICAL at 21:36

## 2023-11-28 RX ADMIN — Medication 40 MILLIGRAM(S): at 06:14

## 2023-11-28 RX ADMIN — Medication 1 PACKET(S): at 11:49

## 2023-11-28 RX ADMIN — OXYCODONE HYDROCHLORIDE 5 MILLIGRAM(S): 5 TABLET ORAL at 16:08

## 2023-11-28 RX ADMIN — APIXABAN 5 MILLIGRAM(S): 2.5 TABLET, FILM COATED ORAL at 17:25

## 2023-11-28 RX ADMIN — APIXABAN 5 MILLIGRAM(S): 2.5 TABLET, FILM COATED ORAL at 06:14

## 2023-11-28 RX ADMIN — Medication 1: at 17:28

## 2023-11-28 RX ADMIN — INSULIN GLARGINE 25 UNIT(S): 100 INJECTION, SOLUTION SUBCUTANEOUS at 21:37

## 2023-11-28 RX ADMIN — Medication 10 UNIT(S): at 17:28

## 2023-11-28 RX ADMIN — OXYCODONE HYDROCHLORIDE 5 MILLIGRAM(S): 5 TABLET ORAL at 15:38

## 2023-11-28 RX ADMIN — Medication 25 MILLIGRAM(S): at 15:37

## 2023-11-28 RX ADMIN — NYSTATIN CREAM 1 APPLICATION(S): 100000 CREAM TOPICAL at 16:30

## 2023-11-28 RX ADMIN — OXYCODONE HYDROCHLORIDE 60 MILLIGRAM(S): 5 TABLET ORAL at 06:45

## 2023-11-28 RX ADMIN — LIDOCAINE 1 APPLICATION(S): 4 CREAM TOPICAL at 06:25

## 2023-11-28 RX ADMIN — OXYCODONE HYDROCHLORIDE 60 MILLIGRAM(S): 5 TABLET ORAL at 18:20

## 2023-11-28 RX ADMIN — NYSTATIN CREAM 1 APPLICATION(S): 100000 CREAM TOPICAL at 06:14

## 2023-11-28 RX ADMIN — BUDESONIDE AND FORMOTEROL FUMARATE DIHYDRATE 2 PUFF(S): 160; 4.5 AEROSOL RESPIRATORY (INHALATION) at 17:25

## 2023-11-28 RX ADMIN — POLYETHYLENE GLYCOL 3350 17 GRAM(S): 17 POWDER, FOR SOLUTION ORAL at 17:25

## 2023-11-28 RX ADMIN — Medication 10 UNIT(S): at 08:12

## 2023-11-28 RX ADMIN — OXYCODONE HYDROCHLORIDE 60 MILLIGRAM(S): 5 TABLET ORAL at 06:15

## 2023-11-28 RX ADMIN — BUDESONIDE AND FORMOTEROL FUMARATE DIHYDRATE 2 PUFF(S): 160; 4.5 AEROSOL RESPIRATORY (INHALATION) at 06:14

## 2023-11-28 RX ADMIN — POLYETHYLENE GLYCOL 3350 17 GRAM(S): 17 POWDER, FOR SOLUTION ORAL at 06:13

## 2023-11-28 RX ADMIN — Medication 1 APPLICATION(S): at 06:12

## 2023-11-28 RX ADMIN — ATORVASTATIN CALCIUM 80 MILLIGRAM(S): 80 TABLET, FILM COATED ORAL at 21:37

## 2023-11-28 RX ADMIN — Medication 10 UNIT(S): at 12:24

## 2023-11-28 RX ADMIN — LACTULOSE 10 GRAM(S): 10 SOLUTION ORAL at 11:07

## 2023-11-28 NOTE — PROGRESS NOTE ADULT - ASSESSMENT
67yo F w/ PMH of HTN, HLD, DM2, HFpEF, CKD III, DVT, Uterine CA and recent hospitalizations including at [facility] for rectal pain i/s/o likely anal fissures pw rectal pain w/ defecation likely i/s/o anal fissures.

## 2023-11-28 NOTE — PROGRESS NOTE ADULT - SUBJECTIVE AND OBJECTIVE BOX
[facility] Division of Hospital Medicine  John Ye MD  Available via MS Teams  Pager: [phone]    SUBJECTIVE / OVERNIGHT EVENTS: Patient seen and examined at bedside. No acute overnight events. Pt reports itching at the folds of the skin. Pt denies dyspnea, chest pain fevers, chills, cough, HA, dizziness, syncope, chest palpitations, abd pain, n/v/d, urinary or bowel changes, active sites of bleeding or any other symptoms at this time.    ADDITIONAL REVIEW OF SYSTEMS: +pruritus     MEDICATIONS  (STANDING):  apixaban 5 milliGRAM(s) Oral every 12 hours  atorvastatin 80 milliGRAM(s) Oral at bedtime  budesonide 160 MICROgram(s)/formoterol 4.5 MICROgram(s) Inhaler 2 Puff(s) Inhalation two times a day  dextrose 5%. 1000 milliLiter(s) (50 mL/Hr) IV Continuous <Continuous>  dextrose 5%. 1000 milliLiter(s) (100 mL/Hr) IV Continuous <Continuous>  dextrose 50% Injectable 25 Gram(s) IV Push once  dextrose 50% Injectable 12.5 Gram(s) IV Push once  dextrose 50% Injectable 25 Gram(s) IV Push once  furosemide    Tablet 40 milliGRAM(s) Oral daily  glucagon  Injectable 1 milliGRAM(s) IntraMuscular once  insulin glargine Injectable (LANTUS) 25 Unit(s) SubCutaneous at bedtime  insulin lispro (ADMELOG) corrective regimen sliding scale   SubCutaneous three times a day before meals  insulin lispro (ADMELOG) corrective regimen sliding scale   SubCutaneous at bedtime  insulin lispro Injectable (ADMELOG) 10 Unit(s) SubCutaneous three times a day before meals  nitroglycerin  0.2% Ointment Compound 1 Application(s) Rectal two times a day  nystatin Powder 1 Application(s) Topical three times a day  oxyCODONE  ER Tablet 60 milliGRAM(s) Oral every 12 hours  polyethylene glycol 3350 17 Gram(s) Oral two times a day  psyllium Powder 1 Packet(s) Oral daily  senna 2 Tablet(s) Oral at bedtime    MEDICATIONS  (PRN):  acetaminophen     Tablet .. 650 milliGRAM(s) Oral every 6 hours PRN Temp greater or equal to 38C (100.4F), Mild Pain (1 - 3)  albuterol    90 MICROgram(s) HFA Inhaler 2 Puff(s) Inhalation every 6 hours PRN Shortness of Breath and/or Wheezing  dextrose Oral Gel 15 Gram(s) Oral once PRN Blood Glucose LESS THAN 70 milliGRAM(s)/deciliter  diphenhydrAMINE 25 milliGRAM(s) Oral every 12 hours PRN Rash and/or Itching  lactulose Syrup 10 Gram(s) Oral two times a day PRN constipation  LORazepam     Tablet 0.5 milliGRAM(s) Oral at bedtime PRN Insomnia  oxyCODONE    IR 5 milliGRAM(s) Oral every 6 hours PRN Moderate Pain (4 - 6)      I&O's Summary    27 Nov 2023 07:01  -  28 Nov 2023 07:00  --------------------------------------------------------  IN: 800 mL / OUT: 1800 mL / NET: -1000 mL    28 Nov 2023 07:01  -  28 Nov 2023 14:38  --------------------------------------------------------  IN: 600 mL / OUT: 1000 mL / NET: -400 mL        PHYSICAL EXAM:  Vital Signs Last 24 Hrs  T(C): 36.3 (28 Nov 2023 08:06), Max: 36.6 (27 Nov 2023 16:37)  T(F): 97.4 (28 Nov 2023 08:06), Max: 97.9 (27 Nov 2023 16:37)  HR: 66 (28 Nov 2023 08:06) (63 - 67)  BP: 111/61 (28 Nov 2023 08:06) (111/61 - 151/69)  BP(mean): --  RR: 18 (28 Nov 2023 08:06) (18 - 18)  SpO2: 98% (28 Nov 2023 08:06) (96% - 99%)    Parameters below as of 28 Nov 2023 08:06  Patient On (Oxygen Delivery Method): nasal cannula  O2 Flow (L/min): 3    CONSTITUTIONAL: NAD, well-developed, well-groomed  EYES: PERRLA; conjunctiva and sclera clear  ENMT: Moist oral mucosa, no pharyngeal injection or exudates; normal dentition  NECK: Supple, no palpable masses; no thyromegaly  RESPIRATORY: Normal respiratory effort; lungs are clear to auscultation bilaterally  CARDIOVASCULAR: Regular rate and rhythm, normal S1 and S2, no murmur/rub/gallop; No lower extremity edema; Peripheral pulses are 2+ bilaterally  ABDOMEN: Nontender to palpation, normoactive bowel sounds, no rebound/guarding; No hepatosplenomegaly  MUSCULOSKELETAL:   no clubbing or cyanosis of digits; no joint swelling or tenderness to palpation  PSYCH: A+O to person, place, and time; affect appropriate  NEUROLOGY: CN 2-12 are intact and symmetric; no gross sensory deficits   SKIN: + erythematous rash at the fold of the skin,     LABS:                    COVID-19 PCR: NotDetec (30 Sep 2023 08:22)  COVID-19 PCR: NotDetec (12 Sep 2023 13:00)  SARS-CoV-2: NotDetec (30 Aug 2023 20:30)      RADIOLOGY & ADDITIONAL TESTS:  New Results Reviewed Today:   New Imaging Personally Reviewed Today:  New Electrocardiogram Personally Reviewed Today:  Prior or Outpatient Records Reviewed Today:    COMMUNICATION:  Care Discussed with Consultants/Other Providers and Details of Discussion:  Discussions with Patient/Family:   PCP Communication:

## 2023-11-28 NOTE — DISCHARGE NOTE PROVIDER - NSDCCPCAREPLAN_GEN_ALL_CORE_FT
PRINCIPAL DISCHARGE DIAGNOSIS  Diagnosis: Fissure, anal  Assessment and Plan of Treatment: Continue current treatment  Follow up with your PCP and/or colorectal surgery      SECONDARY DISCHARGE DIAGNOSES  Diagnosis: DM foot ulcer  Assessment and Plan of Treatment: Continue current treatment  Follow up with podiatry out patient    Diagnosis: Type 2 diabetes mellitus  Assessment and Plan of Treatment: HgA1C this admission 7.6  Make sure you get your HgA1c checked every three months.  If you take oral diabetes medications, check your blood glucose two times a day.  If you take insulin, check your blood glucose before meals and at bedtime.  It's important not to skip any meals.  Keep a log of your blood glucose results and always take it with you to your doctor appointments.  Keep a list of your current medications including injectables and over the counter medications and bring this medication list with you to all your doctor appointments.  If you have not seen your ophthalmologist this year call for appointment.  Check your feet daily for redness, sores, or openings. Do not self treat. If no improvement in two days call your primary care physician for an appointment.  Low blood sugar (hypoglycemia) is a blood sugar below 70mg/dl. Check your blood sugar if you feel signs/symptoms of hypoglycemia. If your blood sugar is below 70 take 15 grams of carbohydrates (ex 4 oz of apple juice, 3-4 glucose tablets, or 4-6 oz of regular soda) wait 15 minutes and repeat blood sugar to make sure it comes up above 70.  If your blood sugar is above 70 and you are due for a meal, have a meal.  If you are not due for a meal have a snack.  This snack helps keeps your blood sugar at a safe range.

## 2023-11-28 NOTE — DISCHARGE NOTE PROVIDER - PROVIDER TOKENS
PROVIDER:[TOKEN:[[id number]],FOLLOWUP:[1 week]],PROVIDER:[TOKEN:[[id number]],FOLLOWUP:[1 week]],PROVIDER:[TOKEN:[[id number]],FOLLOWUP:[2 weeks]] PROVIDER:[TOKEN:[[id number]],FOLLOWUP:[1 week]],PROVIDER:[TOKEN:[[id number]],FOLLOWUP:[1 week]],PROVIDER:[TOKEN:[[id number]],FOLLOWUP:[2 weeks]] PROVIDER:[TOKEN:[[id number]],FOLLOWUP:[1 week]],PROVIDER:[TOKEN:[[id number]],FOLLOWUP:[1 week]],PROVIDER:[TOKEN:[[id number]],FOLLOWUP:[2 weeks]]

## 2023-11-28 NOTE — DISCHARGE NOTE PROVIDER - ATTENDING DISCHARGE PHYSICAL EXAMINATION:
Vital Signs Last 24 Hrs  T(C): 36.6 (01 Dec 2023 13:25), Max: 36.7 (01 Dec 2023 09:30)  T(F): 97.9 (01 Dec 2023 13:25), Max: 98.1 (01 Dec 2023 09:30)  HR: 74 (01 Dec 2023 13:25) (66 - 74)  BP: 117/51 (01 Dec 2023 13:25) (112/66 - 134/55)  BP(mean): --  RR: 18 (01 Dec 2023 13:25) (18 - 18)  SpO2: 98% (01 Dec 2023 13:25) (97% - 98%)    Parameters below as of 01 Dec 2023 13:25  Patient On (Oxygen Delivery Method): nasal cannula        CONSTITUTIONAL: morbidly obese female, in NAD  EYES: PERRLA; conjunctiva and sclera clear  ENMT: Moist oral mucosa, no pharyngeal injection or exudates; normal dentition  NECK: Supple, no palpable masses; no thyromegaly  RESPIRATORY: Normal respiratory effort; lungs are clear to auscultation bilaterally  CARDIOVASCULAR: Regular rate and rhythm, normal S1 and S2, no murmur/rub/gallop; No lower extremity edema; Peripheral pulses are 2+ bilaterally  ABDOMEN: Nontender to palpation, normoactive bowel sounds, no rebound/guarding; No hepatosplenomegaly  MUSCULOSKELETAL:  Normal gait; no clubbing or cyanosis of digits; no joint swelling or tenderness to palpation  PSYCH: A+O to person, place, and time; affect appropriate  NEUROLOGY: CN 2-12 are intact and symmetric; no gross sensory deficits   SKIN: No rashes; no palpable lesions

## 2023-11-28 NOTE — DISCHARGE NOTE PROVIDER - NSDCMRMEDTOKEN_GEN_ALL_CORE_FT
ADVAIR -21 MCG INHALER: TAKE 2 PUFFS BY MOUTH TWICE A DAY  apixaban 5 mg oral tablet: 1 tab(s) orally every 12 hours  furosemide 40 mg oral tablet: 1 tab(s) orally once a day  insulin glargine 100 units/mL subcutaneous solution: 28 international unit(s) subcutaneous once a day (at bedtime) and 30 UNITS SQ DAILY in AM  MiraLax oral powder for reconstitution: 17 gram(s) orally once a day  NovoLOG FlexPen 100 units/mL injectable solution: 20 unit(s) injectable once a day 20 units for breakfast, 16 units for lunch, and 20 units for Dinner.  oxyCODONE 5 mg oral tablet: 1 tab(s) orally every 6 hours As needed Moderate Pain (4 - 6)  oxyCODONE 60 mg oral tablet, extended release: 1 tab(s) orally 2 times a day  ProAir HFA 90 mcg/inh inhalation aerosol: 2 puff(s) inhaled  rosuvastatin 20 mg oral capsule: 1 cap(s) orally once a day (at bedtime)  senna leaf extract oral tablet: 2 tab(s) orally once a day (at bedtime)   ADVAIR -21 MCG INHALER: TAKE 2 PUFFS BY MOUTH TWICE A DAY  apixaban 5 mg oral tablet: 1 tab(s) orally every 12 hours  furosemide 40 mg oral tablet: 1 tab(s) orally once a day  MiraLax oral powder for reconstitution: 17 gram(s) orally once a day  nystatin 100,000 units/g topical powder: 1 Apply topically to affected area 3 times a day  oxyCODONE 5 mg oral tablet: 1 tab(s) orally every 6 hours As needed Moderate Pain (4 - 6)  oxyCODONE 60 mg oral tablet, extended release: 1 tab(s) orally 2 times a day  ProAir HFA 90 mcg/inh inhalation aerosol: 2 puff(s) inhaled  rosuvastatin 20 mg oral capsule: 1 cap(s) orally once a day (at bedtime)  senna leaf extract oral tablet: 2 tab(s) orally once a day (at bedtime)   acetaminophen 325 mg oral tablet: 2 tab(s) orally every 6 hours As needed Temp greater or equal to 38C (100.4F), Mild Pain (1 - 3)  acetylcysteine 20% inhalation solution: 4 milliliter(s) inhaled once a day  ALPRAZolam 0.25 mg oral tablet: 1 tab(s) orally every 8 hours As needed for anxiety/Sleep  atorvastatin 80 mg oral tablet: 1 tab(s) orally once a day (at bedtime)  budesonide-formoterol 80 mcg-4.5 mcg/inh inhalation aerosol: 2 puff(s) inhaled 2 times a day  clotrimazole 1% topical cream: 1 Apply topically to affected area 2 times a day  diphenhydrAMINE 25 mg oral tablet: 1 tab(s) orally 3 times a day as needed for  rash  famotidine 20 mg oral tablet: 1 tab(s) orally once a day  insulin glargine 100 units/mL subcutaneous solution: 40 unit(s) subcutaneous 2 times a day  insulin lispro 100 units/mL injectable solution: 37 unit(s) subcutaneous 3 times a day 15 mins before meal  ipratropium 500 mcg/2.5 mL inhalation solution: 2.5 milliliter(s) inhaled every 6 hours  lactulose 10 g/15 mL oral syrup: 30 milliliter(s) orally once a day  levalbuterol 0.63 mg/3 mL inhalation solution: 3 milliliter(s) inhaled every 6 hours  melatonin 3 mg oral tablet: 1 tab(s) orally once a day (at bedtime) As needed Insomnia  metoprolol tartrate 75 mg oral tablet: 2 tab(s) orally 2 times a day  nystatin 100,000 units/g topical powder: 1 Apply topically to affected area 2 times a day  oxyCODONE 5 mg oral tablet: 1 tab(s) orally every 6 hours As needed Moderate Pain (4 - 6)  oxyCODONE 60 mg oral tablet, extended release: 1 tab(s) orally 2 times a day  polyethylene glycol 3350 oral powder for reconstitution: 17 gram(s) orally once a day  rivaroxaban 15 mg oral tablet: 1 tab(s) orally once a day  senna leaf extract oral tablet: 2 tab(s) orally once a day (at bedtime)

## 2023-11-28 NOTE — DISCHARGE NOTE PROVIDER - CARE PROVIDERS DIRECT ADDRESSES
,[email or web address] ,[email or web address] ,[email or web address]

## 2023-11-28 NOTE — DISCHARGE NOTE PROVIDER - CARE PROVIDER_API CALL
Manjeet Guerrero  Podiatric Medicine and Surgery  [address], [place] [address]  Phone: [phone]  Fax: [phone]  Follow Up Time: 1 week    Hemal Ge  Internal Medicine  [address], [address]  [place] [address]  Phone: [phone]  Fax: [phone]  Follow Up Time: 1 week    Pramod Holman  Colon/Rectal Surgery  [address], [address]  [place] [address]  Phone: [phone]  Fax: [phone]  Follow Up Time: 2 weeks   Manjeet Guerrero  Podiatric Medicine and Surgery  [address], [place] [address]  Phone: [phone]  Fax: [phone]  Follow Up Time: 1 week    Hemal Ge  Internal Medicine  [address], [facility] [address] [facility] [address]  [place] [address]  Phone: [phone]  Fax: [phone]  Follow Up Time: 1 week    Pramod Holman  Colon/Rectal Surgery  [address], [address] [facility] [address]  [place] [address]  Phone: [phone]  Fax: [phone]  Follow Up Time: 2 weeks   Manjeet Guerrero  Podiatric Medicine and Surgery  [address], [place] [address]  Phone: [phone]  Fax: [phone]  Follow Up Time: 1 week    Hemal Ge  Internal Medicine  [address], [facility] [address] [address]  [place] [address]  Phone: [phone]  Fax: [phone]  Follow Up Time: 1 week    Pramod Holman  Colon/Rectal Surgery  [address], [facility] [address]  [place] [address]  Phone: [phone]  Fax: [phone]  Follow Up Time: 2 weeks

## 2023-11-28 NOTE — DISCHARGE NOTE PROVIDER - NPI NUMBER (FOR SYSADMIN USE ONLY) :
[[id number]] [[id number]],[[id number]],[[id number]] [[id number]],[[id number]],[[id number]]

## 2023-11-28 NOTE — CHART NOTE - NSCHARTNOTEFT_GEN_A_CORE
consulted to see pt w/ ankle wound,  pt seen by dpm.  d/w team who is agreeable to defer to dpm.  remain available as requested.
This report was requested by: Karol Rodriguez | Reference #: [id number]    Practitioner Count: 4  Pharmacy Count: 3  Current Opioid Prescriptions: 1  Current Benzodiazepine Prescriptions: 0  Current Stimulant Prescriptions: 0      Patient Demographic Information (PDI)       PDI	First Name	Last Name	Birth Date	Gender	Street Address	[facility] Code  A	Yanet Perez	1955	Female	[address]	[facility]	[address]  B	Yanet Perez	1955	Female	[address] [facility]	[address]  C	Yanet Perez	1955	Female	[address]	[id number]    Prescription Information      PDI Filter:    PDI	Current Rx	Drug Type	Rx Written	Rx Dispensed	Drug	Quantity	Days Supply	Prescriber Name	Prescriber KATE #	Payment Method	Dispenser  A	Y	O	11/10/2023	11/11/2023	oxycontin er 60 mg tablet	60	30	Hemal Ge MD	[id number]	Medicare	[id number]  A	N	O	09/29/2023	10/08/2023	oxycodone hcl (ir) 5 mg tablet	12	3	Daniel Storm(NP)	[id number]	Medicare	[id number]  A	N	O	08/11/2023	08/16/2023	oxycontin er 60 mg tablet	50	30	Hemal Ge MD	[id number]	Medicare	[id number]  A	N	O	07/14/2023	07/17/2023	oxycontin er 60 mg tablet	60	30	Hemal Ge MD	[id number]	Medicare	[id number]  A	N	O	06/14/2023	06/18/2023	oxycontin er 60 mg tablet	60	30	Nael Mendoza	[id number]	Medicare	[id number]  A	N	O	05/16/2023	05/22/2023	oxycontin er 60 mg tablet	50	30	Nael Mendoza	[id number]	Medicare	[id number]  A	N	O	05/16/2023	05/20/2023	oxycontin er 60 mg tablet	10	5	Nael Mendoza	[id number]	Medicare	[id number]  A	N	O	04/18/2023	04/21/2023	oxycontin er 60 mg tablet	60	30	Paula Mccann MD	[id number]	Medicare	[id number]  A	N	O	03/22/2023	03/23/2023	oxycontin er 60 mg tablet	60	30	RamyPaula MD	[id number]	Medicare	[id number]  A	N	O	02/15/2023	02/22/2023	oxycontin er 60 mg tablet	54	30	RamyPaula MD	[id number]	Medicare	[id number]  A	N	O	01/19/2023	01/24/2023	oxycontin er 60 mg tablet	60	30	Vernon Villalobosine	[id number]	Medicare	[id number]  A	N	O	12/19/2022	12/26/2022	oxycontin er 60 mg tablet	60	30	ZolarkiPaula MD	[id number]	Medicare	[id number]  A	N	O	11/22/2022	11/26/2022	oxycontin er 60 mg tablet	60	30	RamyPaula MD	[id number]	Medicare	[id number]  B	N	O	11/04/2023	11/04/2023	oxycodone hcl (ir) 5 mg tablet	14	2	Eduardo, Joseph TOSCANO DO	[id number]	[place]	[facility]  B	N	O	11/04/2023	11/04/2023	oxycontin er 60 mg tablet	14	7	Eduardo, Joseph TOSCANO DO	[id number]	[place]	[facility]  B	N	O	10/31/2023	10/31/2023	oxycontin er 60 mg tablet	14	7	Eduardo, Joseph TOSCANO DO	[id number]	[place]	[facility]  B	N	O	10/17/2023	10/18/2023	oxycontin er 60 mg tablet	28	14	Eduardo, Joseph TOSCANO DO	[id number]	[place]	[facility]  B	N	O	10/09/2023	10/09/2023	oxycodone hcl (ir) 5 mg tablet	60	10	EduardoJoseph yao DO	[id number]	[place]	[facility]  B	N	O	10/04/2023	10/05/2023	oxycodone hcl er 20 mg tablet	84	14	EduardoJoseph yao DO	[id number]	[place]	[facility]  B	N	O	10/04/2023	10/04/2023	oxycodone hcl (ir) 5 mg cap	30	5	EduardoJoseph yao DO	[id number]	[place]	[facility]  C	N	O	09/14/2023	09/15/2023	oxycontin er 60 mg tablet	24	12	Juan Pablo Mitchell	[id number]	[place]	[facility]  C	N	O	09/14/2023	09/14/2023	oxycontin er 60 mg tablet	6	3	Juan Pablo Mitchell	[id number]	[place]	[facility]

## 2023-11-28 NOTE — DISCHARGE NOTE PROVIDER - DETAILS OF MALNUTRITION DIAGNOSIS/DIAGNOSES
This patient has been assessed with a concern for Malnutrition and was treated during this hospitalization for the following Nutrition diagnosis/diagnoses:     -  11/27/2023: Morbid obesity (BMI > 40)

## 2023-11-28 NOTE — DISCHARGE NOTE PROVIDER - HOSPITAL COURSE
HPI:  Pt is a 67yo F w/ PMH of HTN, HLD, DM2, HFpEF, CKD III, DVT, Uterine CA and recent hospitalizations including at [facility] for rectal pain i/s/o likely anal fissures pw rectal pain w/ defecation.     Reports 1 day of increased rectal pain that's worse w/ passing of flatus or w/ BMs. Denies any hematochezia, melena, abd pain. She does report not being able to complete sitz baths at home and has recently been discharged from rehab and unable to take care of her ADLs at home due to limited mobility.    In the ED VSS on chronic 4L NC w/ labs non-actionable and CT A/P w/out acute findings. She was administered Morphine 4mg x3, Lidocaine cream for anal fissure.   (25 Nov 2023 00:58)    Hospital Course:  Pt admitted with rectal pain, Anal Fissure - CT A/P w/o acute pathology; CRS consulted, no acute surgical intervention; SITZ bath TID, Miralax/Senna, Lidocaine Cr post BM, Nitroglycerin ointment BID.  Pt with H/o DM2 - on Lantus 56U at home and Admelog 20-26U at home; started on wt. based Insulin for now to avoid hypoglycemia - Lantus 25U qHS and Admelog 10U TID w/ meals along w/ TIARA; monitor FS.  Suspected PE (LEGER w/ elevated enzymes at OSH on recent admission w/ suspected PE at the time) - w/ home dose of Eliquis 5mg BID.  Pt also with new wound anterior right ankle area with no signs of infection and no signs of cellulitis.  Seen by podiatry, Rx betadine with gauze and yasir right ankle.  Patient has been medically cleared for discharge as per Dr. Ye.  Patient has been given appropriate discharge instructions including medication regimen, access site management and follow up. Medications that patient needs refills on (+/- new medications) have been e-prescribed to preferred pharmacy. Patient will f/u with Dr. Ge, podiatry, and surgery in 1-2 weeks for further management.       Important Medication Changes and Reason:    Active or Pending Issues Requiring Follow-up:    Advanced Directives:   [X ] Full code  [ ] DNR  [ ] Hospice    Discharge Diagnoses:  Anal Fissure  Right ankle ulcer  H/o DM2  Suspected PE              HPI:  Pt is a 69yo F w/ PMH of HTN, HLD, DM2, HFpEF, CKD III, DVT, Uterine CA and recent hospitalizations including at [facility] for rectal pain i/s/o likely anal fissures pw rectal pain w/ defecation.     Reports 1 day of increased rectal pain that's worse w/ passing of flatus or w/ BMs. Denies any hematochezia, melena, abd pain. She does report not being able to complete sitz baths at home and has recently been discharged from rehab and unable to take care of her ADLs at home due to limited mobility.    In the ED VSS on chronic 4L NC w/ labs non-actionable and CT A/P w/out acute findings. She was administered Morphine 4mg x3, Lidocaine cream for anal fissure.   (25 Nov 2023 00:58)    Hospital Course:  Pt admitted with rectal pain, Anal Fissure - CT A/P w/o acute pathology; CRS consulted, no acute surgical intervention; SITZ bath TID, Miralax/Senna, Lidocaine Cr post BM, Nitroglycerin ointment BID.  Pt with H/o DM2 - on Lantus 56U at home and Admelog 20-26U at home; started on wt. based Insulin for now to avoid hypoglycemia - Lantus 25U qHS and Admelog 10U TID w/ meals along w/ TIARA; monitor FS.  Suspected PE (LEGER w/ elevated enzymes at OSH on recent admission w/ suspected PE at the time) - w/ home dose of Eliquis 5mg BID.  Pt also with new wound anterior right ankle area with no signs of infection and no signs of cellulitis.  Seen by podiatry, Rx betadine with gauze and aysir right ankle.  Patient has been medically cleared for discharge as per Dr. Ye.  Patient has been given appropriate discharge instructions including medication regimen, access site management and follow up. Medications that patient needs refills on (+/- new medications) have been e-prescribed to preferred pharmacy. Patient will f/u with Dr. Ge, podiatry, and surgery in 1-2 weeks for further management.       Important Medication Changes and Reason:    Active or Pending Issues Requiring Follow-up:    Advanced Directives:   [X ] Full code  [ ] DNR  [ ] Hospice    Discharge Diagnoses:  Anal Fissure  Right ankle ulcer  H/o DM2  Suspected PE              HPI:  Pt is a 69yo F w/ PMH of HTN, HLD, DM2, HFpEF, CKD III, DVT, Uterine CA and recent hospitalizations including at [facility] for rectal pain i/s/o likely anal fissures pw rectal pain w/ defecation.     Reports 1 day of increased rectal pain that's worse w/ passing of flatus or w/ BMs. Denies any hematochezia, melena, abd pain. She does report not being able to complete sitz baths at home and has recently been discharged from rehab and unable to take care of her ADLs at home due to limited mobility.    In the ED VSS on chronic 4L NC w/ labs non-actionable and CT A/P w/out acute findings. She was administered Morphine 4mg x3, Lidocaine cream for anal fissure.   (25 Nov 2023 00:58)    Hospital Course:  Pt admitted with rectal pain, Anal Fissure - CT A/P w/o acute pathology; CRS consulted, no acute surgical intervention; SITZ bath TID, Miralax/Senna, Lidocaine Cr post BM, Nitroglycerin ointment BID.  Pt with H/o DM2 - on Lantus 56U at home and Admelog 20-26U at home; started on wt. based Insulin for now to avoid hypoglycemia - Lantus 25U qHS and Admelog 10U TID w/ meals along w/ TIARA; monitor FS.  Suspected PE (LEGER w/ elevated enzymes at OSH on recent admission w/ suspected PE at the time) - w/ home dose of Eliquis 5mg BID.  Pt also with new wound anterior right ankle area with no signs of infection and no signs of cellulitis.  Seen by podiatry, Rx betadine with gauze and yasir right ankle.  Patient has been medically cleared for discharge as per Dr. Ye.  Patient has been given appropriate discharge instructions including medication regimen, access site management and follow up. Medications that patient needs refills on (+/- new medications) have been e-prescribed to preferred pharmacy. Patient will f/u with Dr. Ge, podiatry, and surgery in 1-2 weeks for further management.       Important Medication Changes and Reason:    Active or Pending Issues Requiring Follow-up:    Advanced Directives:   [X ] Full code  [ ] DNR  [ ] Hospice    Discharge Diagnoses:  Anal Fissure  Right ankle ulcer  H/o DM2  Suspected PE              HPI:  Pt is a 69yo F w/ PMH of HTN, HLD, DM2, HFpEF, CKD III, DVT, Uterine CA and recent hospitalizations including at [facility] for rectal pain i/s/o likely anal fissures pw rectal pain w/ defecation.     Reports 1 day of increased rectal pain that's worse w/ passing of flatus or w/ BMs. Denies any hematochezia, melena, abd pain. She does report not being able to complete sitz baths at home and has recently been discharged from rehab and unable to take care of her ADLs at home due to limited mobility.    In the ED VSS on chronic 4L NC w/ labs non-actionable and CT A/P w/out acute findings. She was administered Morphine 4mg x3, Lidocaine cream for anal fissure.   (25 Nov 2023 00:58)    Hospital Course:    Pt admitted with rectal pain, Anal Fissure - CT A/P w/o acute pathology; CRS consulted, no acute surgical intervention; SITZ bath TID, Miralax/Senna, Lidocaine Cr post BM, Nitroglycerin ointment BID.  Pt with H/o DM2 - on Lantus 56U at home and Admelog 20-26U at home; started on wt. based Insulin for now to avoid hypoglycemia - Lantus 25U qHS and Admelog 10U TID w/ meals along w/ TIARA; monitor FS.  Suspected PE (LEGER w/ elevated enzymes at OSH on recent admission w/ suspected PE at the time) - w/ home dose of Eliquis 5mg BID.  Pt also with new wound anterior right ankle area with no signs of infection and no signs of cellulitis.  Seen by podiatry, Rx betadine with gauze and yasir right ankle.  Patient has been medically cleared for discharge as per Dr. Ye.  Patient has been given appropriate discharge instructions including medication regimen, access site management and follow up. Medications that patient needs refills on (+/- new medications) have been e-prescribed to preferred pharmacy. Patient will f/u with Dr. Ge, podiatry, and surgery in 1-2 weeks for further management.       Important Medication Changes and Reason:    Active or Pending Issues Requiring Follow-up:    Advanced Directives:   [X ] Full code  [ ] DNR  [ ] Hospice    Discharge Diagnoses:  Anal Fissure  Right ankle ulcer  H/o DM2  Suspected PE              HPI:  Pt is a 67yo F w/ PMH of HTN, HLD, DM2, HFpEF, CKD III, DVT, Uterine CA and recent hospitalizations including at [facility] for rectal pain i/s/o likely anal fissures pw rectal pain w/ defecation.     Reports 1 day of increased rectal pain that's worse w/ passing of flatus or w/ BMs. Denies any hematochezia, melena, abd pain. She does report not being able to complete sitz baths at home and has recently been discharged from rehab and unable to take care of her ADLs at home due to limited mobility.    In the ED VSS on chronic 4L NC w/ labs non-actionable and CT A/P w/out acute findings. She was administered Morphine 4mg x3, Lidocaine cream for anal fissure.   (25 Nov 2023 00:58)    Hospital Course:    Pt admitted with rectal pain, Anal Fissure - CT A/P w/o acute pathology; CRS consulted, no acute surgical intervention; SITZ bath TID, Miralax/Senna, Lidocaine Cr post BM, Nitroglycerin ointment BID.  Pt with H/o DM2 - on Lantus 56U at home and Admelog 20-26U at home; started on wt. based Insulin for now to avoid hypoglycemia - Lantus 25U qHS and Admelog 10U TID w/ meals along w/ TIARA; monitor FS.  Suspected PE (LEGER w/ elevated enzymes at OSH on recent admission w/ suspected PE at the time) - w/ home dose of Eliquis 5mg BID.  Pt also with new wound anterior right ankle area with no signs of infection and no signs of cellulitis.  Seen by podiatry, Rx betadine with gauze and yasir right ankle.  Patient has been medically cleared for discharge as per Dr. Ye.  Patient has been given appropriate discharge instructions including medication regimen, access site management and follow up. Medications that patient needs refills on (+/- new medications) have been e-prescribed to preferred pharmacy. Patient will f/u with Dr. Ge, podiatry, and surgery in 1-2 weeks for further management.       Important Medication Changes and Reason:    Active or Pending Issues Requiring Follow-up:    Advanced Directives:   [X ] Full code  [ ] DNR  [ ] Hospice    Discharge Diagnoses:  Anal Fissure  Right ankle ulcer  H/o DM2  Suspected PE              HPI:  Pt is a 69yo F w/ PMH of HTN, HLD, DM2, HFpEF, CKD III, DVT, Uterine CA and recent hospitalizations including at [facility] for rectal pain i/s/o likely anal fissures pw rectal pain w/ defecation.     Reports 1 day of increased rectal pain that's worse w/ passing of flatus or w/ BMs. Denies any hematochezia, melena, abd pain. She does report not being able to complete sitz baths at home and has recently been discharged from rehab and unable to take care of her ADLs at home due to limited mobility.    In the ED VSS on chronic 4L NC w/ labs non-actionable and CT A/P w/out acute findings. She was administered Morphine 4mg x3, Lidocaine cream for anal fissure.   (25 Nov 2023 00:58)    Hospital Course:    Pt admitted with rectal pain, Anal Fissure - CT A/P w/o acute pathology; CRS consulted, no acute surgical intervention; SITZ bath TID, Miralax/Senna, Lidocaine Cr post BM, Nitroglycerin ointment BID.  Pt with H/o DM2 - on Lantus 56U at home and Admelog 20-26U at home; started on wt. based Insulin for now to avoid hypoglycemia - Lantus 25U qHS and Admelog 10U TID w/ meals along w/ TIARA; monitor FS.  Suspected PE (LEGER w/ elevated enzymes at OSH on recent admission w/ suspected PE at the time) - w/ home dose of Eliquis 5mg BID.  Pt also with new wound anterior right ankle area with no signs of infection and no signs of cellulitis.  Seen by podiatry, Rx betadine with gauze and yasir right ankle.  Patient has been medically cleared for discharge as per Dr. Ye.  Patient has been given appropriate discharge instructions including medication regimen, access site management and follow up. Medications that patient needs refills on (+/- new medications) have been e-prescribed to preferred pharmacy. Patient will f/u with Dr. Ge, podiatry, and surgery in 1-2 weeks for further management.       Important Medication Changes and Reason:    Active or Pending Issues Requiring Follow-up:    Advanced Directives:   [X ] Full code  [ ] DNR  [ ] Hospice    Discharge Diagnoses:  Anal Fissure  Right ankle ulcer  H/o DM2  Suspected PE              HPI:  Pt is a 67yo F w/ PMH of HTN, HLD, DM2, HFpEF, CKD III, DVT, Uterine CA and recent hospitalizations including at [facility] for rectal pain i/s/o likely anal fissures pw rectal pain w/ defecation.     Reports 1 day of increased rectal pain that's worse w/ passing of flatus or w/ BMs. Denies any hematochezia, melena, abd pain. She does report not being able to complete sitz baths at home and has recently been discharged from rehab and unable to take care of her ADLs at home due to limited mobility.    In the ED VSS on chronic 4L NC w/ labs non-actionable and CT A/P w/out acute findings. She was administered Morphine 4mg x3, Lidocaine cream for anal fissure.   (25 Nov 2023 00:58)    Hospital Course:    Pt admitted with rectal pain, Anal Fissure - CT A/P w/o acute pathology; CRS consulted, no acute surgical intervention; SITZ bath TID, Miralax/Senna, Lidocaine Cr post BM, Nitroglycerin ointment BID.  Pt with H/o DM2 - on Lantus 56U at home and Admelog 20-26U at home; started on wt. based Insulin for now to avoid hypoglycemia - Lantus 25U qHS and Admelog 10U TID w/ meals along w/ TIARA; monitor FS.  Suspected PE (LEGER w/ elevated enzymes at OSH on recent admission w/ suspected PE at the time) - w/ home dose of Eliquis 5mg BID.  Pt also with new wound anterior right ankle area with no signs of infection and no signs of cellulitis.  Seen by podiatry, Rx betadine with gauze and yasir right ankle.  Patient has been medically cleared for discharge as per Dr. Ye.  Patient has been given appropriate discharge instructions including medication regimen, access site management and follow up. Medications that patient needs refills on (+/- new medications) have been e-prescribed to preferred pharmacy. Patient will f/u with Dr. Ge, podiatry, and surgery in 1-2 weeks for further management.       Important Medication Changes and Reason:    Active or Pending Issues Requiring Follow-up:    Advanced Directives:   [X ] Full code  [ ] DNR  [ ] Hospice    Discharge Diagnoses:  Anal Fissure  Right ankle ulcer  H/o DM2  Suspected PE, recent history of PE              HPI:  Pt is a 69yo F w/ PMH of HTN, HLD, DM2, HFpEF, CKD III, DVT, Uterine CA and recent hospitalizations including at [facility] for rectal pain i/s/o likely anal fissures pw rectal pain w/ defecation.     Reports 1 day of increased rectal pain that's worse w/ passing of flatus or w/ BMs. Denies any hematochezia, melena, abd pain. She does report not being able to complete sitz baths at home and has recently been discharged from rehab and unable to take care of her ADLs at home due to limited mobility.    In the ED VSS on chronic 4L NC w/ labs non-actionable and CT A/P w/out acute findings. She was administered Morphine 4mg x3, Lidocaine cream for anal fissure.   (25 Nov 2023 00:58)    Hospital Course:    Pt admitted with rectal pain, Anal Fissure - CT A/P w/o acute pathology; CRS consulted, no acute surgical intervention; SITZ bath TID, Miralax/Senna, Lidocaine Cr post BM, Nitroglycerin ointment BID.  Pt with H/o DM2 - on Lantus 56U at home and Admelog 20-26U at home; started on wt. based Insulin for now to avoid hypoglycemia - Lantus 25U qHS and Admelog 10U TID w/ meals along w/ TIARA; monitor FS.  Suspected PE (LEGER w/ elevated enzymes at OSH on recent admission w/ suspected PE at the time) - w/ home dose of Eliquis 5mg BID.  Pt also with new wound anterior right ankle area with no signs of infection and no signs of cellulitis.  Seen by podiatry, Rx betadine with gauze and yasir right ankle.  Patient has been medically cleared for discharge as per Dr. Ye.  Patient has been given appropriate discharge instructions including medication regimen, access site management and follow up. Medications that patient needs refills on (+/- new medications) have been e-prescribed to preferred pharmacy. Patient will f/u with Dr. Ge, podiatry, and surgery in 1-2 weeks for further management.       Important Medication Changes and Reason:    Active or Pending Issues Requiring Follow-up:    Advanced Directives:   [X ] Full code  [ ] DNR  [ ] Hospice    Discharge Diagnoses:  Anal Fissure  Right ankle ulcer  H/o DM2  Suspected PE, recent history of PE              HPI:  Pt is a 67yo F w/ PMH of HTN, HLD, DM2, HFpEF, CKD III, DVT, Uterine CA and recent hospitalizations including at [facility] for rectal pain i/s/o likely anal fissures pw rectal pain w/ defecation.     Reports 1 day of increased rectal pain that's worse w/ passing of flatus or w/ BMs. Denies any hematochezia, melena, abd pain. She does report not being able to complete sitz baths at home and has recently been discharged from rehab and unable to take care of her ADLs at home due to limited mobility.    In the ED VSS on chronic 4L NC w/ labs non-actionable and CT A/P w/out acute findings. She was administered Morphine 4mg x3, Lidocaine cream for anal fissure.   (25 Nov 2023 00:58)    Hospital Course:    Pt admitted with rectal pain, Anal Fissure - CT A/P w/o acute pathology; CRS consulted, no acute surgical intervention; SITZ bath TID, Miralax/Senna, Lidocaine Cr post BM, Nitroglycerin ointment BID.  Pt with H/o DM2 - on Lantus 56U at home and Admelog 20-26U at home; started on wt. based Insulin for now to avoid hypoglycemia - Lantus 25U qHS and Admelog 10U TID w/ meals along w/ TIARA; monitor FS.  Suspected PE (LEGER w/ elevated enzymes at OSH on recent admission w/ suspected PE at the time) - w/ home dose of Eliquis 5mg BID.  Pt also with new wound anterior right ankle area with no signs of infection and no signs of cellulitis.  Seen by podiatry, Rx betadine with gauze and yasir right ankle.  Patient has been medically cleared for discharge as per Dr. Ye.  Patient has been given appropriate discharge instructions including medication regimen, access site management and follow up. Medications that patient needs refills on (+/- new medications) have been e-prescribed to preferred pharmacy. Patient will f/u with Dr. Ge, podiatry, and surgery in 1-2 weeks for further management.       Important Medication Changes and Reason:    Active or Pending Issues Requiring Follow-up:    Advanced Directives:   [X ] Full code  [ ] DNR  [ ] Hospice    Discharge Diagnoses:  Anal Fissure  Right ankle ulcer  H/o DM2  Suspected PE, recent history of PE

## 2023-11-29 LAB
GLUCOSE BLDC GLUCOMTR-MCNC: 146 MG/DL — HIGH (ref 70–99)
GLUCOSE BLDC GLUCOMTR-MCNC: 146 MG/DL — HIGH (ref 70–99)
GLUCOSE BLDC GLUCOMTR-MCNC: 174 MG/DL — HIGH (ref 70–99)
GLUCOSE BLDC GLUCOMTR-MCNC: 174 MG/DL — HIGH (ref 70–99)
GLUCOSE BLDC GLUCOMTR-MCNC: 189 MG/DL — HIGH (ref 70–99)
GLUCOSE BLDC GLUCOMTR-MCNC: 189 MG/DL — HIGH (ref 70–99)
GLUCOSE BLDC GLUCOMTR-MCNC: 199 MG/DL — HIGH (ref 70–99)
GLUCOSE BLDC GLUCOMTR-MCNC: 199 MG/DL — HIGH (ref 70–99)

## 2023-11-29 PROCEDURE — 99232 SBSQ HOSP IP/OBS MODERATE 35: CPT

## 2023-11-29 RX ORDER — LACTULOSE 10 G/15ML
10 SOLUTION ORAL THREE TIMES A DAY
Refills: 0 | Status: DISCONTINUED | OUTPATIENT
Start: 2023-11-29 | End: 2023-12-01

## 2023-11-29 RX ORDER — DIPHENHYDRAMINE HCL 50 MG
50 CAPSULE ORAL EVERY 12 HOURS
Refills: 0 | Status: COMPLETED | OUTPATIENT
Start: 2023-11-29 | End: 2023-12-01

## 2023-11-29 RX ADMIN — OXYCODONE HYDROCHLORIDE 60 MILLIGRAM(S): 5 TABLET ORAL at 18:06

## 2023-11-29 RX ADMIN — NYSTATIN CREAM 1 APPLICATION(S): 100000 CREAM TOPICAL at 05:38

## 2023-11-29 RX ADMIN — OXYCODONE HYDROCHLORIDE 5 MILLIGRAM(S): 5 TABLET ORAL at 17:15

## 2023-11-29 RX ADMIN — Medication 25 MILLIGRAM(S): at 06:16

## 2023-11-29 RX ADMIN — Medication 50 MILLIGRAM(S): at 16:12

## 2023-11-29 RX ADMIN — OXYCODONE HYDROCHLORIDE 60 MILLIGRAM(S): 5 TABLET ORAL at 06:36

## 2023-11-29 RX ADMIN — OXYCODONE HYDROCHLORIDE 5 MILLIGRAM(S): 5 TABLET ORAL at 10:42

## 2023-11-29 RX ADMIN — LACTULOSE 10 GRAM(S): 10 SOLUTION ORAL at 16:11

## 2023-11-29 RX ADMIN — BUDESONIDE AND FORMOTEROL FUMARATE DIHYDRATE 2 PUFF(S): 160; 4.5 AEROSOL RESPIRATORY (INHALATION) at 17:43

## 2023-11-29 RX ADMIN — POLYETHYLENE GLYCOL 3350 17 GRAM(S): 17 POWDER, FOR SOLUTION ORAL at 17:41

## 2023-11-29 RX ADMIN — SENNA PLUS 2 TABLET(S): 8.6 TABLET ORAL at 21:39

## 2023-11-29 RX ADMIN — Medication 40 MILLIGRAM(S): at 05:37

## 2023-11-29 RX ADMIN — Medication 1: at 13:11

## 2023-11-29 RX ADMIN — OXYCODONE HYDROCHLORIDE 5 MILLIGRAM(S): 5 TABLET ORAL at 00:33

## 2023-11-29 RX ADMIN — NYSTATIN CREAM 1 APPLICATION(S): 100000 CREAM TOPICAL at 12:21

## 2023-11-29 RX ADMIN — Medication 1 PACKET(S): at 12:20

## 2023-11-29 RX ADMIN — POLYETHYLENE GLYCOL 3350 17 GRAM(S): 17 POWDER, FOR SOLUTION ORAL at 05:36

## 2023-11-29 RX ADMIN — OXYCODONE HYDROCHLORIDE 5 MILLIGRAM(S): 5 TABLET ORAL at 11:12

## 2023-11-29 RX ADMIN — Medication 10 UNIT(S): at 17:42

## 2023-11-29 RX ADMIN — Medication 1 APPLICATION(S): at 05:37

## 2023-11-29 RX ADMIN — Medication 1: at 17:42

## 2023-11-29 RX ADMIN — Medication 10 UNIT(S): at 13:11

## 2023-11-29 RX ADMIN — OXYCODONE HYDROCHLORIDE 5 MILLIGRAM(S): 5 TABLET ORAL at 16:45

## 2023-11-29 RX ADMIN — ATORVASTATIN CALCIUM 80 MILLIGRAM(S): 80 TABLET, FILM COATED ORAL at 21:39

## 2023-11-29 RX ADMIN — INSULIN GLARGINE 25 UNIT(S): 100 INJECTION, SOLUTION SUBCUTANEOUS at 21:39

## 2023-11-29 RX ADMIN — Medication 10 UNIT(S): at 07:42

## 2023-11-29 RX ADMIN — Medication 1 APPLICATION(S): at 17:33

## 2023-11-29 RX ADMIN — OXYCODONE HYDROCHLORIDE 60 MILLIGRAM(S): 5 TABLET ORAL at 05:36

## 2023-11-29 RX ADMIN — OXYCODONE HYDROCHLORIDE 5 MILLIGRAM(S): 5 TABLET ORAL at 01:33

## 2023-11-29 RX ADMIN — APIXABAN 5 MILLIGRAM(S): 2.5 TABLET, FILM COATED ORAL at 17:44

## 2023-11-29 RX ADMIN — OXYCODONE HYDROCHLORIDE 60 MILLIGRAM(S): 5 TABLET ORAL at 17:36

## 2023-11-29 RX ADMIN — BUDESONIDE AND FORMOTEROL FUMARATE DIHYDRATE 2 PUFF(S): 160; 4.5 AEROSOL RESPIRATORY (INHALATION) at 05:37

## 2023-11-29 RX ADMIN — NYSTATIN CREAM 1 APPLICATION(S): 100000 CREAM TOPICAL at 21:40

## 2023-11-29 RX ADMIN — APIXABAN 5 MILLIGRAM(S): 2.5 TABLET, FILM COATED ORAL at 05:37

## 2023-11-29 NOTE — PROGRESS NOTE ADULT - ASSESSMENT
67yo F w/ PMH of HTN, HLD, DM2, HFpEF, CKD III, DVT, Uterine CA and recent hospitalizations including at [facility] for rectal pain i/s/o likely anal fissures pw rectal pain w/ defecation likely i/s/o anal fissures.

## 2023-11-29 NOTE — PROGRESS NOTE ADULT - SUBJECTIVE AND OBJECTIVE BOX
[facility] Division of Hospital Medicine  John Ye MD  Available via MS Teams  Pager: [phone]    SUBJECTIVE / OVERNIGHT EVENTS: Patient seen and examined at bedside. No acute overnight events. Pt reports pain with defecation.     ADDITIONAL REVIEW OF SYSTEMS: +pain with defecation     MEDICATIONS  (STANDING):  apixaban 5 milliGRAM(s) Oral every 12 hours  atorvastatin 80 milliGRAM(s) Oral at bedtime  budesonide 160 MICROgram(s)/formoterol 4.5 MICROgram(s) Inhaler 2 Puff(s) Inhalation two times a day  dextrose 5%. 1000 milliLiter(s) (50 mL/Hr) IV Continuous <Continuous>  dextrose 5%. 1000 milliLiter(s) (100 mL/Hr) IV Continuous <Continuous>  dextrose 50% Injectable 25 Gram(s) IV Push once  dextrose 50% Injectable 25 Gram(s) IV Push once  dextrose 50% Injectable 12.5 Gram(s) IV Push once  furosemide    Tablet 40 milliGRAM(s) Oral daily  glucagon  Injectable 1 milliGRAM(s) IntraMuscular once  insulin glargine Injectable (LANTUS) 25 Unit(s) SubCutaneous at bedtime  insulin lispro (ADMELOG) corrective regimen sliding scale   SubCutaneous three times a day before meals  insulin lispro (ADMELOG) corrective regimen sliding scale   SubCutaneous at bedtime  insulin lispro Injectable (ADMELOG) 10 Unit(s) SubCutaneous three times a day before meals  nitroglycerin  0.2% Ointment Compound 1 Application(s) Rectal two times a day  nystatin Powder 1 Application(s) Topical three times a day  oxyCODONE  ER Tablet 60 milliGRAM(s) Oral every 12 hours  polyethylene glycol 3350 17 Gram(s) Oral two times a day  psyllium Powder 1 Packet(s) Oral daily  senna 2 Tablet(s) Oral at bedtime    MEDICATIONS  (PRN):  acetaminophen     Tablet .. 650 milliGRAM(s) Oral every 6 hours PRN Temp greater or equal to 38C (100.4F), Mild Pain (1 - 3)  albuterol    90 MICROgram(s) HFA Inhaler 2 Puff(s) Inhalation every 6 hours PRN Shortness of Breath and/or Wheezing  dextrose Oral Gel 15 Gram(s) Oral once PRN Blood Glucose LESS THAN 70 milliGRAM(s)/deciliter  diphenhydrAMINE 50 milliGRAM(s) Oral every 12 hours PRN Rash and/or Itching  lactulose Syrup 10 Gram(s) Oral three times a day PRN constipation  LORazepam     Tablet 0.5 milliGRAM(s) Oral at bedtime PRN Insomnia  oxyCODONE    IR 5 milliGRAM(s) Oral every 6 hours PRN Moderate Pain (4 - 6)      I&O's Summary    28 Nov 2023 07:01  -  29 Nov 2023 07:00  --------------------------------------------------------  IN: 1040 mL / OUT: 3750 mL / NET: -2710 mL    29 Nov 2023 07:01  -  29 Nov 2023 15:50  --------------------------------------------------------  IN: 620 mL / OUT: 1100 mL / NET: -480 mL        PHYSICAL EXAM:  Vital Signs Last 24 Hrs  T(C): 36.7 (29 Nov 2023 13:27), Max: 36.7 (29 Nov 2023 13:27)  T(F): 98.1 (29 Nov 2023 13:27), Max: 98.1 (29 Nov 2023 13:27)  HR: 77 (29 Nov 2023 13:27) (65 - 77)  BP: 97/57 (29 Nov 2023 13:27) (97/57 - 151/68)  BP(mean): --  RR: 18 (29 Nov 2023 13:27) (18 - 19)  SpO2: 98% (29 Nov 2023 13:27) (97% - 99%)    Parameters below as of 29 Nov 2023 13:27  Patient On (Oxygen Delivery Method): nasal cannula  O2 Flow (L/min): 3    CONSTITUTIONAL: morbidly obsese   EYES: PERRLA; conjunctiva and sclera clear  ENMT: Moist oral mucosa  RESPIRATORY: Normal respiratory effort; lungs are clear to auscultation bilaterally  CARDIOVASCULAR: Regular rate and rhythm, normal S1 and S2, no murmur/rub/gallop; No lower extremity edema; Peripheral pulses are 2+ bilaterally  ABDOMEN: Nontender to palpation, normoactive bowel sounds, no rebound/guarding; No hepatosplenomegaly  MUSCULOSKELETAL:   no clubbing or cyanosis of digits; no joint swelling or tenderness to palpation  PSYCH: A+O to person, place, and time; affect appropriate  NEUROLOGY: CN 2-12 are intact and symmetric; no gross sensory deficits   SKIN: +erythematous rashes in inner folds; no palpable lesions    LABS:                    COVID-19 PCR: NotDetec (30 Sep 2023 08:22)  COVID-19 PCR: NotDetec (12 Sep 2023 13:00)  SARS-CoV-2: NotDetec (30 Aug 2023 20:30)      RADIOLOGY & ADDITIONAL TESTS:  New Results Reviewed Today:   New Imaging Personally Reviewed Today:  New Electrocardiogram Personally Reviewed Today:  Prior or Outpatient Records Reviewed Today:    COMMUNICATION:  Care Discussed with Consultants/Other Providers and Details of Discussion:  Discussions with Patient/Family:  PCP Communication:

## 2023-11-30 LAB
GLUCOSE BLDC GLUCOMTR-MCNC: 158 MG/DL — HIGH (ref 70–99)
GLUCOSE BLDC GLUCOMTR-MCNC: 158 MG/DL — HIGH (ref 70–99)
GLUCOSE BLDC GLUCOMTR-MCNC: 180 MG/DL — HIGH (ref 70–99)
GLUCOSE BLDC GLUCOMTR-MCNC: 180 MG/DL — HIGH (ref 70–99)
GLUCOSE BLDC GLUCOMTR-MCNC: 183 MG/DL — HIGH (ref 70–99)
GLUCOSE BLDC GLUCOMTR-MCNC: 183 MG/DL — HIGH (ref 70–99)
GLUCOSE BLDC GLUCOMTR-MCNC: 196 MG/DL — HIGH (ref 70–99)
GLUCOSE BLDC GLUCOMTR-MCNC: 196 MG/DL — HIGH (ref 70–99)
GLUCOSE BLDC GLUCOMTR-MCNC: 201 MG/DL — HIGH (ref 70–99)
GLUCOSE BLDC GLUCOMTR-MCNC: 201 MG/DL — HIGH (ref 70–99)

## 2023-11-30 PROCEDURE — 99232 SBSQ HOSP IP/OBS MODERATE 35: CPT

## 2023-11-30 RX ADMIN — Medication 40 MILLIGRAM(S): at 05:50

## 2023-11-30 RX ADMIN — ATORVASTATIN CALCIUM 80 MILLIGRAM(S): 80 TABLET, FILM COATED ORAL at 21:42

## 2023-11-30 RX ADMIN — Medication 10 UNIT(S): at 12:58

## 2023-11-30 RX ADMIN — BUDESONIDE AND FORMOTEROL FUMARATE DIHYDRATE 2 PUFF(S): 160; 4.5 AEROSOL RESPIRATORY (INHALATION) at 17:20

## 2023-11-30 RX ADMIN — OXYCODONE HYDROCHLORIDE 5 MILLIGRAM(S): 5 TABLET ORAL at 22:13

## 2023-11-30 RX ADMIN — SENNA PLUS 2 TABLET(S): 8.6 TABLET ORAL at 21:42

## 2023-11-30 RX ADMIN — Medication 1: at 08:51

## 2023-11-30 RX ADMIN — Medication 0.5 MILLIGRAM(S): at 00:06

## 2023-11-30 RX ADMIN — OXYCODONE HYDROCHLORIDE 60 MILLIGRAM(S): 5 TABLET ORAL at 05:49

## 2023-11-30 RX ADMIN — POLYETHYLENE GLYCOL 3350 17 GRAM(S): 17 POWDER, FOR SOLUTION ORAL at 17:18

## 2023-11-30 RX ADMIN — LACTULOSE 10 GRAM(S): 10 SOLUTION ORAL at 08:54

## 2023-11-30 RX ADMIN — Medication 1 APPLICATION(S): at 05:50

## 2023-11-30 RX ADMIN — INSULIN GLARGINE 25 UNIT(S): 100 INJECTION, SOLUTION SUBCUTANEOUS at 21:42

## 2023-11-30 RX ADMIN — Medication 1 PACKET(S): at 12:58

## 2023-11-30 RX ADMIN — OXYCODONE HYDROCHLORIDE 5 MILLIGRAM(S): 5 TABLET ORAL at 08:54

## 2023-11-30 RX ADMIN — NYSTATIN CREAM 1 APPLICATION(S): 100000 CREAM TOPICAL at 22:15

## 2023-11-30 RX ADMIN — Medication 10 UNIT(S): at 17:20

## 2023-11-30 RX ADMIN — APIXABAN 5 MILLIGRAM(S): 2.5 TABLET, FILM COATED ORAL at 17:18

## 2023-11-30 RX ADMIN — OXYCODONE HYDROCHLORIDE 5 MILLIGRAM(S): 5 TABLET ORAL at 21:43

## 2023-11-30 RX ADMIN — BUDESONIDE AND FORMOTEROL FUMARATE DIHYDRATE 2 PUFF(S): 160; 4.5 AEROSOL RESPIRATORY (INHALATION) at 05:49

## 2023-11-30 RX ADMIN — NYSTATIN CREAM 1 APPLICATION(S): 100000 CREAM TOPICAL at 12:56

## 2023-11-30 RX ADMIN — OXYCODONE HYDROCHLORIDE 60 MILLIGRAM(S): 5 TABLET ORAL at 06:20

## 2023-11-30 RX ADMIN — Medication 2: at 12:57

## 2023-11-30 RX ADMIN — OXYCODONE HYDROCHLORIDE 60 MILLIGRAM(S): 5 TABLET ORAL at 17:17

## 2023-11-30 RX ADMIN — NYSTATIN CREAM 1 APPLICATION(S): 100000 CREAM TOPICAL at 05:50

## 2023-11-30 RX ADMIN — Medication 10 UNIT(S): at 08:52

## 2023-11-30 RX ADMIN — Medication 50 MILLIGRAM(S): at 08:54

## 2023-11-30 RX ADMIN — POLYETHYLENE GLYCOL 3350 17 GRAM(S): 17 POWDER, FOR SOLUTION ORAL at 05:49

## 2023-11-30 RX ADMIN — OXYCODONE HYDROCHLORIDE 5 MILLIGRAM(S): 5 TABLET ORAL at 15:36

## 2023-11-30 RX ADMIN — OXYCODONE HYDROCHLORIDE 5 MILLIGRAM(S): 5 TABLET ORAL at 09:24

## 2023-11-30 RX ADMIN — Medication 1: at 17:20

## 2023-11-30 RX ADMIN — APIXABAN 5 MILLIGRAM(S): 2.5 TABLET, FILM COATED ORAL at 05:49

## 2023-11-30 RX ADMIN — OXYCODONE HYDROCHLORIDE 5 MILLIGRAM(S): 5 TABLET ORAL at 15:06

## 2023-11-30 RX ADMIN — OXYCODONE HYDROCHLORIDE 60 MILLIGRAM(S): 5 TABLET ORAL at 17:47

## 2023-11-30 RX ADMIN — Medication 1 APPLICATION(S): at 17:21

## 2023-11-30 NOTE — PROGRESS NOTE ADULT - PROBLEM SELECTOR PROBLEM 3
Suspected pulmonary embolism

## 2023-11-30 NOTE — PROGRESS NOTE ADULT - REASON FOR ADMISSION
Rectal pain
no blood in stool/no chills/no abdominal distension/no dysuria/no fever/no hematuria/no burning urination/no vomiting/no nausea

## 2023-11-30 NOTE — PROGRESS NOTE ADULT - PROBLEM SELECTOR PLAN 3
- [place] w/ elevated enzymes at OSH on recent admission w/ suspected PE at the time  - c/w  Eliquis 5mg BID,
- [place] w/ elevated enzymes at OSH on recent admission w/ suspected PE at the time  - Now on Eliquis 5mg BID, continue.
- [place] w/ elevated enzymes at OSH on recent admission w/ suspected PE at the time  - c/w  Eliquis 5mg BID,
- [place] w/ elevated enzymes at OSH on recent admission w/ suspected PE at the time  - c/w  Eliquis 5mg BID,

## 2023-11-30 NOTE — PROGRESS NOTE ADULT - PROBLEM SELECTOR PLAN 1
- No acute surgical intervention   - SITZ Bath TID  - Miralax BID and Senna qhs; lactulose PRN  - Lidocaine cream after every BM while on pain regimen  - Nifedipine cream upon discharge (not on hospital formulary)  - Nitroglycerin ointment BID for now  - Outpatient f/u Dr. Holman
- No acute surgical intervention   - SITZ Bath TID  - Miralax BID and Senna qhs; lactulose PRN, increase dose to 10mg TID PRN   - Lidocaine cream after every BM while on pain regimen  - Nifedipine cream upon discharge (not on hospital formulary)  - Nitroglycerin ointment BID for now  - Outpatient f/u Dr. Holman
- No acute surgical intervention   - SITZ Bath TID  - Miralax BID and Senna qhs; lactulose PRN  - Lidocaine cream after every BM while on pain regimen  - Nifedipine cream upon discharge (not on hospital formulary)  - Nitroglycerin ointment BID for now  - Outpatient f/u Dr. Holman
- No acute surgical intervention   - SITZ Bath TID  - Miralax BID and Senna qhs; lactulose PRN, increase dose to 10mg TID PRN   - Lidocaine cream after every BM while on pain regimen  - Nifedipine cream upon discharge (not on hospital formulary)  - Nitroglycerin ointment BID for now  - Outpatient f/u Dr. Holman
- No acute surgical intervention   - SITZ Bath TID  - Miralax BID and Senna qhs; lactulose PRN  - Lidocaine cream after every BM while on pain regimen  - Nifedipine cream upon discharge (not on hospital formulary)  - Nitroglycerin ointment BID for now  - Outpatient f/u Dr. Holman
- No acute surgical intervention   - SITZ Bath TID  - Miralax BID and Senna qhs - if no BM tomorrow will consider increasing bowel regimen  - Lidocaine cream after every BM while on pain regimen  - Nifedipine cream upon discharge (not on hospital formulary)  - Nitroglycerin ointment BID for now  - Outpatient f/u Dr. Holman
No

## 2023-11-30 NOTE — PROGRESS NOTE ADULT - SUBJECTIVE AND OBJECTIVE BOX
[facility] Division of Hospital Medicine  John Ye MD  Available via MS Teams  Pager: [phone]    SUBJECTIVE / OVERNIGHT EVENTS: Patient seen and examined at bedside. No acute overnight events. No new symptoms.     ADDITIONAL REVIEW OF SYSTEMS: n/a    MEDICATIONS  (STANDING):  apixaban 5 milliGRAM(s) Oral every 12 hours  atorvastatin 80 milliGRAM(s) Oral at bedtime  budesonide 160 MICROgram(s)/formoterol 4.5 MICROgram(s) Inhaler 2 Puff(s) Inhalation two times a day  dextrose 5%. 1000 milliLiter(s) (100 mL/Hr) IV Continuous <Continuous>  dextrose 5%. 1000 milliLiter(s) (50 mL/Hr) IV Continuous <Continuous>  dextrose 50% Injectable 25 Gram(s) IV Push once  dextrose 50% Injectable 25 Gram(s) IV Push once  dextrose 50% Injectable 12.5 Gram(s) IV Push once  furosemide    Tablet 40 milliGRAM(s) Oral daily  glucagon  Injectable 1 milliGRAM(s) IntraMuscular once  insulin glargine Injectable (LANTUS) 25 Unit(s) SubCutaneous at bedtime  insulin lispro (ADMELOG) corrective regimen sliding scale   SubCutaneous three times a day before meals  insulin lispro (ADMELOG) corrective regimen sliding scale   SubCutaneous at bedtime  insulin lispro Injectable (ADMELOG) 10 Unit(s) SubCutaneous three times a day before meals  nitroglycerin  0.2% Ointment Compound 1 Application(s) Rectal two times a day  nystatin Powder 1 Application(s) Topical three times a day  oxyCODONE  ER Tablet 60 milliGRAM(s) Oral every 12 hours  polyethylene glycol 3350 17 Gram(s) Oral two times a day  psyllium Powder 1 Packet(s) Oral daily  senna 2 Tablet(s) Oral at bedtime    MEDICATIONS  (PRN):  acetaminophen     Tablet .. 650 milliGRAM(s) Oral every 6 hours PRN Temp greater or equal to 38C (100.4F), Mild Pain (1 - 3)  albuterol    90 MICROgram(s) HFA Inhaler 2 Puff(s) Inhalation every 6 hours PRN Shortness of Breath and/or Wheezing  dextrose Oral Gel 15 Gram(s) Oral once PRN Blood Glucose LESS THAN 70 milliGRAM(s)/deciliter  diphenhydrAMINE 50 milliGRAM(s) Oral every 12 hours PRN Rash and/or Itching  lactulose Syrup 10 Gram(s) Oral three times a day PRN constipation  LORazepam     Tablet 0.5 milliGRAM(s) Oral at bedtime PRN Insomnia  oxyCODONE    IR 5 milliGRAM(s) Oral every 6 hours PRN Moderate Pain (4 - 6)      I&O's Summary    29 Nov 2023 07:01  -  30 Nov 2023 07:00  --------------------------------------------------------  IN: 620 mL / OUT: 2500 mL / NET: -1880 mL    30 Nov 2023 07:01  -  30 Nov 2023 16:05  --------------------------------------------------------  IN: 480 mL / OUT: 1500 mL / NET: -1020 mL        PHYSICAL EXAM:  Vital Signs Last 24 Hrs  T(C): 36.6 (30 Nov 2023 13:37), Max: 36.9 (29 Nov 2023 17:00)  T(F): 97.8 (30 Nov 2023 13:37), Max: 98.4 (29 Nov 2023 17:00)  HR: 74 (30 Nov 2023 13:37) (68 - 77)  BP: 120/70 (30 Nov 2023 13:37) (104/49 - 151/62)  BP(mean): --  RR: 18 (30 Nov 2023 13:37) (18 - 18)  SpO2: 99% (30 Nov 2023 13:37) (96% - 99%)    Parameters below as of 30 Nov 2023 13:37  Patient On (Oxygen Delivery Method): nasal cannula  O2 Flow (L/min): 3    CONSTITUTIONAL: morbidly obese female   EYES: PERRLA; conjunctiva and sclera clear  ENMT: Moist oral mucosa, no pharyngeal injection or exudates; normal dentition  NECK: Supple, no palpable masses; no thyromegaly  RESPIRATORY: Normal respiratory effort; lungs are clear to auscultation bilaterally  CARDIOVASCULAR: Regular rate and rhythm, normal S1 and S2, no murmur/rub/gallop; No lower extremity edema; Peripheral pulses are 2+ bilaterally  ABDOMEN: Nontender to palpation, normoactive bowel sounds, no rebound/guarding; No hepatosplenomegaly  MUSCULOSKELETAL:   no clubbing or cyanosis of digits; no joint swelling or tenderness to palpation  PSYCH: A+O to person, place, and time; affect appropriate  NEUROLOGY: CN 2-12 are intact and symmetric; no gross sensory deficits   SKIN: No rashes; no palpable lesions    LABS:                    COVID-19 PCR: NotDetec (30 Sep 2023 08:22)  COVID-19 PCR: NotDetec (12 Sep 2023 13:00)  SARS-CoV-2: NotDetec (30 Aug 2023 20:30)      RADIOLOGY & ADDITIONAL TESTS:  New Results Reviewed Today:   New Imaging Personally Reviewed Today:  New Electrocardiogram Personally Reviewed Today:  Prior or Outpatient Records Reviewed Today:    COMMUNICATION:  Care Discussed with Consultants/Other Providers and Details of Discussion:  Discussions with Patient/Family:  PCP Communication:

## 2023-11-30 NOTE — PROGRESS NOTE ADULT - PROBLEM SELECTOR PROBLEM 1
Acute anal fissure

## 2023-11-30 NOTE — PROGRESS NOTE ADULT - NUTRITIONAL ASSESSMENT
This patient has been assessed with a concern for Malnutrition and has been determined to have a diagnosis/diagnoses of Morbid obesity (BMI > 40).    This patient is being managed with:   Diet DASH/TLC-  Sodium & Cholesterol Restricted  Consistent Carbohydrate {No Snacks} (CSTCHO)  Entered: Nov 25 2023 12:44AM  

## 2023-11-30 NOTE — PROGRESS NOTE ADULT - PROVIDER SPECIALTY LIST ADULT
Hospitalist
Surgery
Hospitalist
Internal Medicine

## 2023-11-30 NOTE — PROGRESS NOTE ADULT - PROBLEM SELECTOR PLAN 7
- c/w Atorvastatin 80mg qhs in place of home Rosuvastatin 20mg

## 2023-11-30 NOTE — PROGRESS NOTE ADULT - PROBLEM SELECTOR PLAN 4
- Stable w/out s/s of acute exacerbation   - Monitor for VOL

## 2023-11-30 NOTE — PROGRESS NOTE ADULT - TIME BILLING
Evaluation, chart review, care coordination, treatment planning.
chart reviewing, history taking, physical exam, assessment and documentation, including speaking to specialist/SW/CM regarding the management.
chart reviewing, history taking, physical exam, assessment and documentation, including speaking to specialist/SW/CM regarding the management.
Alert and oriented to person, place and time
chart reviewing, history taking, physical exam, assessment and documentation, including speaking to specialist/SW/CM regarding the management.
chart reviewing, history taking, physical exam, assessment and documentation, including speaking to specialist/SW/CM regarding the management.

## 2023-11-30 NOTE — PROGRESS NOTE ADULT - PROBLEM SELECTOR PLAN 8
with functional quadraplegia  - Nutrition c/s  - Reports inability to care for self at home as left rehab too early because she ran out of insurance days --> will need CM/Social work eval to assist with a safe discharge.  - c/w home pain regimen
- Nutrition c/s  - Reports inability to care for self at home as left rehab too early  - PT in AM  - SW in AM re help at home as potentially requiring/requesting more  - c/w home pain regimen
with functional quadraplegia  - Nutrition c/s  - Reports inability to care for self at home as left rehab too early because she ran out of insurance days --> will need CM/Social work eval to assist with a safe discharge.  - c/w home pain regimen
with functional quadraplegia  - Nutrition c/s  - Reports inability to care for self at home as left rehab too early because she ran out of insurance days --> will need CM/Social work eval to assist with a safe discharge.  - c/w home pain regimen

## 2023-11-30 NOTE — PROGRESS NOTE ADULT - PROBLEM SELECTOR PLAN 2
- On Lantus 56U at home and Admelog 20-26U at home  - Will start weight based for now to avoid hypoglycemia as had in the past   - c/w Lantus 25U qHS and Admelog 10U TID w/ meals along w/ TIARA  - A1C:7.6
- On Lantus 56U at home and Admelog 20-26U at home  - Will start weight based for now to avoid hypoglycemia as had in the past   - Start Lantus 25U qHS and Admelog 10U TID w/ meals along w/ TIARA  - f/u A1c  - CC diet  - Nutrition c/s
- On Lantus 56U at home and Admelog 20-26U at home  - Will start weight based for now to avoid hypoglycemia as had in the past   - c/w Lantus 25U qHS and Admelog 10U TID w/ meals along w/ TIARA  - A1C:7.6

## 2023-11-30 NOTE — PROGRESS NOTE ADULT - ASSESSMENT
67yo F w/ PMH of HTN, HLD, DM2, HFpEF, CKD III, DVT, Uterine CA and recent hospitalizations including at [facility] for rectal pain i/s/o likely anal fissures pw rectal pain w/ defecation likely i/s/o anal fissures.

## 2023-11-30 NOTE — PROGRESS NOTE ADULT - PROBLEM SELECTOR PLAN 9
DVT ppx: On Eliquis  Diet: DASH/CC
DVT ppx: On Eliquis  Diet: DASH/CC  Dispo: Pending clinical improvement  ______________  Del Arthur MD  [facility]  [phone]
DVT ppx: On Eliquis  Diet: DASH/CC
DVT ppx: On Eliquis  Diet: DASH/CC

## 2023-11-30 NOTE — PROGRESS NOTE ADULT - PROBLEM SELECTOR PLAN 6
- c/w home Lasix 40mg PO qd

## 2023-12-01 ENCOUNTER — TRANSCRIPTION ENCOUNTER (OUTPATIENT)
Age: 68
End: 2023-12-01

## 2023-12-01 VITALS
DIASTOLIC BLOOD PRESSURE: 51 MMHG | RESPIRATION RATE: 18 BRPM | TEMPERATURE: 98 F | HEART RATE: 74 BPM | OXYGEN SATURATION: 98 % | SYSTOLIC BLOOD PRESSURE: 117 MMHG

## 2023-12-01 LAB
ANION GAP SERPL CALC-SCNC: 12 MMOL/L — SIGNIFICANT CHANGE UP (ref 5–17)
ANION GAP SERPL CALC-SCNC: 12 MMOL/L — SIGNIFICANT CHANGE UP (ref 5–17)
BUN SERPL-MCNC: 41 MG/DL — HIGH (ref 7–23)
BUN SERPL-MCNC: 41 MG/DL — HIGH (ref 7–23)
CALCIUM SERPL-MCNC: 9.5 MG/DL — SIGNIFICANT CHANGE UP (ref 8.4–10.5)
CALCIUM SERPL-MCNC: 9.5 MG/DL — SIGNIFICANT CHANGE UP (ref 8.4–10.5)
CHLORIDE SERPL-SCNC: 97 MMOL/L — SIGNIFICANT CHANGE UP (ref 96–108)
CHLORIDE SERPL-SCNC: 97 MMOL/L — SIGNIFICANT CHANGE UP (ref 96–108)
CO2 SERPL-SCNC: 29 MMOL/L — SIGNIFICANT CHANGE UP (ref 22–31)
CO2 SERPL-SCNC: 29 MMOL/L — SIGNIFICANT CHANGE UP (ref 22–31)
CREAT SERPL-MCNC: 2.01 MG/DL — HIGH (ref 0.5–1.3)
CREAT SERPL-MCNC: 2.01 MG/DL — HIGH (ref 0.5–1.3)
EGFR: 26 ML/MIN/1.73M2 — LOW
EGFR: 26 ML/MIN/1.73M2 — LOW
GLUCOSE BLDC GLUCOMTR-MCNC: 160 MG/DL — HIGH (ref 70–99)
GLUCOSE BLDC GLUCOMTR-MCNC: 160 MG/DL — HIGH (ref 70–99)
GLUCOSE BLDC GLUCOMTR-MCNC: 199 MG/DL — HIGH (ref 70–99)
GLUCOSE BLDC GLUCOMTR-MCNC: 199 MG/DL — HIGH (ref 70–99)
GLUCOSE BLDC GLUCOMTR-MCNC: 221 MG/DL — HIGH (ref 70–99)
GLUCOSE BLDC GLUCOMTR-MCNC: 221 MG/DL — HIGH (ref 70–99)
GLUCOSE SERPL-MCNC: 172 MG/DL — HIGH (ref 70–99)
GLUCOSE SERPL-MCNC: 172 MG/DL — HIGH (ref 70–99)
HCT VFR BLD CALC: 34.3 % — LOW (ref 34.5–45)
HCT VFR BLD CALC: 34.3 % — LOW (ref 34.5–45)
HGB BLD-MCNC: 10.8 G/DL — LOW (ref 11.5–15.5)
HGB BLD-MCNC: 10.8 G/DL — LOW (ref 11.5–15.5)
MCHC RBC-ENTMCNC: 29.2 PG — SIGNIFICANT CHANGE UP (ref 27–34)
MCHC RBC-ENTMCNC: 29.2 PG — SIGNIFICANT CHANGE UP (ref 27–34)
MCHC RBC-ENTMCNC: 31.5 GM/DL — LOW (ref 32–36)
MCHC RBC-ENTMCNC: 31.5 GM/DL — LOW (ref 32–36)
MCV RBC AUTO: 92.7 FL — SIGNIFICANT CHANGE UP (ref 80–100)
MCV RBC AUTO: 92.7 FL — SIGNIFICANT CHANGE UP (ref 80–100)
NRBC # BLD: 0 /100 WBCS — SIGNIFICANT CHANGE UP (ref 0–0)
NRBC # BLD: 0 /100 WBCS — SIGNIFICANT CHANGE UP (ref 0–0)
PLATELET # BLD AUTO: 203 K/UL — SIGNIFICANT CHANGE UP (ref 150–400)
PLATELET # BLD AUTO: 203 K/UL — SIGNIFICANT CHANGE UP (ref 150–400)
POTASSIUM SERPL-MCNC: 4.5 MMOL/L — SIGNIFICANT CHANGE UP (ref 3.5–5.3)
POTASSIUM SERPL-MCNC: 4.5 MMOL/L — SIGNIFICANT CHANGE UP (ref 3.5–5.3)
POTASSIUM SERPL-SCNC: 4.5 MMOL/L — SIGNIFICANT CHANGE UP (ref 3.5–5.3)
POTASSIUM SERPL-SCNC: 4.5 MMOL/L — SIGNIFICANT CHANGE UP (ref 3.5–5.3)
RBC # BLD: 3.7 M/UL — LOW (ref 3.8–5.2)
RBC # BLD: 3.7 M/UL — LOW (ref 3.8–5.2)
RBC # FLD: 15.4 % — HIGH (ref 10.3–14.5)
RBC # FLD: 15.4 % — HIGH (ref 10.3–14.5)
SODIUM SERPL-SCNC: 138 MMOL/L — SIGNIFICANT CHANGE UP (ref 135–145)
SODIUM SERPL-SCNC: 138 MMOL/L — SIGNIFICANT CHANGE UP (ref 135–145)
WBC # BLD: 12.11 K/UL — HIGH (ref 3.8–10.5)
WBC # BLD: 12.11 K/UL — HIGH (ref 3.8–10.5)
WBC # FLD AUTO: 12.11 K/UL — HIGH (ref 3.8–10.5)
WBC # FLD AUTO: 12.11 K/UL — HIGH (ref 3.8–10.5)

## 2023-12-01 PROCEDURE — 82803 BLOOD GASES ANY COMBINATION: CPT

## 2023-12-01 PROCEDURE — 99285 EMERGENCY DEPT VISIT HI MDM: CPT | Mod: 25

## 2023-12-01 PROCEDURE — 97110 THERAPEUTIC EXERCISES: CPT

## 2023-12-01 PROCEDURE — 97161 PT EVAL LOW COMPLEX 20 MIN: CPT

## 2023-12-01 PROCEDURE — 96376 TX/PRO/DX INJ SAME DRUG ADON: CPT

## 2023-12-01 PROCEDURE — 97530 THERAPEUTIC ACTIVITIES: CPT

## 2023-12-01 PROCEDURE — 85018 HEMOGLOBIN: CPT

## 2023-12-01 PROCEDURE — 83036 HEMOGLOBIN GLYCOSYLATED A1C: CPT

## 2023-12-01 PROCEDURE — 85025 COMPLETE CBC W/AUTO DIFF WBC: CPT

## 2023-12-01 PROCEDURE — 82010 KETONE BODYS QUAN: CPT

## 2023-12-01 PROCEDURE — 97112 NEUROMUSCULAR REEDUCATION: CPT

## 2023-12-01 PROCEDURE — 81001 URINALYSIS AUTO W/SCOPE: CPT

## 2023-12-01 PROCEDURE — 84100 ASSAY OF PHOSPHORUS: CPT

## 2023-12-01 PROCEDURE — 85027 COMPLETE CBC AUTOMATED: CPT

## 2023-12-01 PROCEDURE — 87086 URINE CULTURE/COLONY COUNT: CPT

## 2023-12-01 PROCEDURE — 86900 BLOOD TYPING SEROLOGIC ABO: CPT

## 2023-12-01 PROCEDURE — 80048 BASIC METABOLIC PNL TOTAL CA: CPT

## 2023-12-01 PROCEDURE — 86901 BLOOD TYPING SEROLOGIC RH(D): CPT

## 2023-12-01 PROCEDURE — 96375 TX/PRO/DX INJ NEW DRUG ADDON: CPT

## 2023-12-01 PROCEDURE — 83735 ASSAY OF MAGNESIUM: CPT

## 2023-12-01 PROCEDURE — 96374 THER/PROPH/DIAG INJ IV PUSH: CPT

## 2023-12-01 PROCEDURE — 85730 THROMBOPLASTIN TIME PARTIAL: CPT

## 2023-12-01 PROCEDURE — 83605 ASSAY OF LACTIC ACID: CPT

## 2023-12-01 PROCEDURE — 99239 HOSP IP/OBS DSCHRG MGMT >30: CPT

## 2023-12-01 PROCEDURE — 84295 ASSAY OF SERUM SODIUM: CPT

## 2023-12-01 PROCEDURE — 82947 ASSAY GLUCOSE BLOOD QUANT: CPT

## 2023-12-01 PROCEDURE — 82962 GLUCOSE BLOOD TEST: CPT

## 2023-12-01 PROCEDURE — 85014 HEMATOCRIT: CPT

## 2023-12-01 PROCEDURE — 80053 COMPREHEN METABOLIC PANEL: CPT

## 2023-12-01 PROCEDURE — 94640 AIRWAY INHALATION TREATMENT: CPT

## 2023-12-01 PROCEDURE — 84132 ASSAY OF SERUM POTASSIUM: CPT

## 2023-12-01 PROCEDURE — 86850 RBC ANTIBODY SCREEN: CPT

## 2023-12-01 PROCEDURE — 82435 ASSAY OF BLOOD CHLORIDE: CPT

## 2023-12-01 PROCEDURE — 36415 COLL VENOUS BLD VENIPUNCTURE: CPT

## 2023-12-01 PROCEDURE — 74176 CT ABD & PELVIS W/O CONTRAST: CPT | Mod: MA

## 2023-12-01 PROCEDURE — 85610 PROTHROMBIN TIME: CPT

## 2023-12-01 PROCEDURE — 82330 ASSAY OF CALCIUM: CPT

## 2023-12-01 RX ORDER — INSULIN ASPART 100 [IU]/ML
20 INJECTION, SOLUTION SUBCUTANEOUS
Qty: 1 | Refills: 0
Start: 2023-12-01 | End: 2023-12-30

## 2023-12-01 RX ORDER — INSULIN GLARGINE 100 [IU]/ML
28 INJECTION, SOLUTION SUBCUTANEOUS
Qty: 1 | Refills: 0
Start: 2023-12-01 | End: 2023-12-30

## 2023-12-01 RX ORDER — INSULIN ASPART 100 [IU]/ML
20 INJECTION, SOLUTION SUBCUTANEOUS
Qty: 0 | Refills: 0 | DISCHARGE

## 2023-12-01 RX ORDER — INSULIN GLARGINE 100 [IU]/ML
28 INJECTION, SOLUTION SUBCUTANEOUS
Refills: 0 | DISCHARGE

## 2023-12-01 RX ORDER — NYSTATIN CREAM 100000 [USP'U]/G
1 CREAM TOPICAL
Qty: 0 | Refills: 0 | DISCHARGE
Start: 2023-12-01

## 2023-12-01 RX ADMIN — OXYCODONE HYDROCHLORIDE 5 MILLIGRAM(S): 5 TABLET ORAL at 09:56

## 2023-12-01 RX ADMIN — Medication 10 UNIT(S): at 13:10

## 2023-12-01 RX ADMIN — Medication 50 MILLIGRAM(S): at 13:09

## 2023-12-01 RX ADMIN — OXYCODONE HYDROCHLORIDE 5 MILLIGRAM(S): 5 TABLET ORAL at 10:26

## 2023-12-01 RX ADMIN — Medication 10 UNIT(S): at 08:08

## 2023-12-01 RX ADMIN — POLYETHYLENE GLYCOL 3350 17 GRAM(S): 17 POWDER, FOR SOLUTION ORAL at 06:44

## 2023-12-01 RX ADMIN — OXYCODONE HYDROCHLORIDE 5 MILLIGRAM(S): 5 TABLET ORAL at 04:17

## 2023-12-01 RX ADMIN — Medication 40 MILLIGRAM(S): at 06:44

## 2023-12-01 RX ADMIN — NYSTATIN CREAM 1 APPLICATION(S): 100000 CREAM TOPICAL at 06:43

## 2023-12-01 RX ADMIN — NYSTATIN CREAM 1 APPLICATION(S): 100000 CREAM TOPICAL at 13:13

## 2023-12-01 RX ADMIN — Medication 1 PACKET(S): at 13:09

## 2023-12-01 RX ADMIN — APIXABAN 5 MILLIGRAM(S): 2.5 TABLET, FILM COATED ORAL at 06:44

## 2023-12-01 RX ADMIN — Medication 1: at 08:08

## 2023-12-01 RX ADMIN — Medication 1 APPLICATION(S): at 06:46

## 2023-12-01 RX ADMIN — BUDESONIDE AND FORMOTEROL FUMARATE DIHYDRATE 2 PUFF(S): 160; 4.5 AEROSOL RESPIRATORY (INHALATION) at 06:45

## 2023-12-01 RX ADMIN — OXYCODONE HYDROCHLORIDE 60 MILLIGRAM(S): 5 TABLET ORAL at 06:43

## 2023-12-01 RX ADMIN — Medication 0.5 MILLIGRAM(S): at 00:07

## 2023-12-01 RX ADMIN — OXYCODONE HYDROCHLORIDE 5 MILLIGRAM(S): 5 TABLET ORAL at 03:47

## 2023-12-01 RX ADMIN — Medication 1: at 13:10

## 2023-12-01 NOTE — DISCHARGE NOTE NURSING/CASE MANAGEMENT/SOCIAL WORK - PATIENT PORTAL LINK FT
You can access the FollowMyHealth Patient Portal offered by [facility] by registering at the following website: [email or web address]. By joining [facility]’s FollowMyHealth portal, you will also be able to view your health information using other applications (apps) compatible with our system.

## 2023-12-01 NOTE — DISCHARGE NOTE NURSING/CASE MANAGEMENT/SOCIAL WORK - NSDCVIVACCINE_GEN_ALL_CORE_FT
COVID-19 vaccine, vector-nr, rS-Ad26, PF, 0.5 mL (Omar); 16-Mar-2021 13:03; Kimberly Maya (RN); [facility]); [id number] (Exp. Date: 26-May-2021); IntraMuscular; Deltoid Left.; 0.5 milliLiter(s);   COVID-19, mRNA, LNP-S, PF, 100 mcg/ 0.5 mL dose (Moderna); 28-Jan-2022 10:09; Senia Juan (RN); [employer] [id number] (Exp. Date: 14-Apr-2022); IntraMuscular; Deltoid Left.; 0.25 milliLiter(s);   influenza, injectable, quadrivalent, preservative free; 09-Nov-2018 13:40; Mane Camejo (RN); [employer]; [id number] (Exp. Date: 16-Jun-2019); IntraMuscular; Deltoid Left.; 0.5 milliLiter(s); VIS (VIS Published: 07-Aug-2015, VIS Presented: 09-Nov-2018);   influenza, injectable, quadrivalent, preservative free; 15-Nov-2020 12:04; Ramiro Alexander (RN); [employer]; [id number] (Exp. Date: 30-Jun-2021); IntraMuscular; Deltoid Left.; 0.5 milliLiter(s); VIS (VIS Published: 15-Aug-2019, VIS Presented: 15-Nov-2020);   influenza, high-dose, quadrivalent; 28-Jan-2022 11:42; Senia Juan (RN); [employer]; [id number] (Exp. Date: 30-Jun-2022); IntraMuscular; Deltoid Right.; 0.7 milliLiter(s); VIS (VIS Published: 06-Aug-2021, VIS Presented: 28-Jan-2022);   influenza, high-dose, quadrivalent; 04-Oct-2023 13:14; Coreen Abraham (RN); [employer]; [id number] (Exp. Date: 01-Jun-2026); IntraMuscular; Deltoid Left.; 0.7 milliLiter(s); VIS (VIS Published: 06-Aug-2021, VIS Presented: 04-Oct-2023);   Tdap; 24-Jan-2022 08:46; Lisa Yanes (RN); [employer]; [id number] (Exp. Date: 09-Sep-2023); IntraMuscular; Deltoid Left.; 0.5 milliLiter(s); VIS (VIS Published: 09-May-2013, VIS Presented: 24-Jan-2022);   Tdap; 30-Aug-2023 17:21; Edwige Reyes (DANIEL); [employer]; [id number] (Exp. Date: 03-Oct-2025); IntraMuscular; Deltoid Left.; 0.5 milliLiter(s); VIS (VIS Published: 09-May-2013, VIS Presented: 30-Aug-2023);

## 2023-12-01 NOTE — DISCHARGE NOTE NURSING/CASE MANAGEMENT/SOCIAL WORK - NSDCPEFALRISK_GEN_ALL_CORE
For information on Fall & Injury Prevention, visit: [email or web address] OR  [email or web address] OR  https://www.cdc.gov/steadi/patient.html

## 2023-12-16 ENCOUNTER — INPATIENT (INPATIENT)
Facility: HOSPITAL | Age: 68
LOS: 22 days | Discharge: NURSING, REHAB OR LONG-TERM CARE | DRG: 286 | End: 2024-01-08
Attending: HOSPITALIST | Admitting: STUDENT IN AN ORGANIZED HEALTH CARE EDUCATION/TRAINING PROGRAM
Payer: MEDICARE

## 2023-12-16 VITALS
HEIGHT: 65 IN | RESPIRATION RATE: 18 BRPM | SYSTOLIC BLOOD PRESSURE: 141 MMHG | HEART RATE: 96 BPM | DIASTOLIC BLOOD PRESSURE: 60 MMHG | WEIGHT: 293 LBS | OXYGEN SATURATION: 96 % | TEMPERATURE: 98 F

## 2023-12-16 DIAGNOSIS — Z90.49 ACQUIRED ABSENCE OF OTHER SPECIFIED PARTS OF DIGESTIVE TRACT: Chronic | ICD-10-CM

## 2023-12-16 DIAGNOSIS — I50.33 ACUTE ON CHRONIC DIASTOLIC (CONGESTIVE) HEART FAILURE: ICD-10-CM

## 2023-12-16 DIAGNOSIS — Z98.890 OTHER SPECIFIED POSTPROCEDURAL STATES: Chronic | ICD-10-CM

## 2023-12-16 DIAGNOSIS — I50.9 HEART FAILURE, UNSPECIFIED: ICD-10-CM

## 2023-12-16 DIAGNOSIS — Z90.710 ACQUIRED ABSENCE OF BOTH CERVIX AND UTERUS: Chronic | ICD-10-CM

## 2023-12-16 LAB
ALBUMIN SERPL ELPH-MCNC: 4 G/DL — SIGNIFICANT CHANGE UP (ref 3.3–5.2)
ALBUMIN SERPL ELPH-MCNC: 4 G/DL — SIGNIFICANT CHANGE UP (ref 3.3–5.2)
ALP SERPL-CCNC: 138 U/L — HIGH (ref 40–120)
ALP SERPL-CCNC: 138 U/L — HIGH (ref 40–120)
ALT FLD-CCNC: 28 U/L — SIGNIFICANT CHANGE UP
ALT FLD-CCNC: 28 U/L — SIGNIFICANT CHANGE UP
ANION GAP SERPL CALC-SCNC: 12 MMOL/L — SIGNIFICANT CHANGE UP (ref 5–17)
ANION GAP SERPL CALC-SCNC: 12 MMOL/L — SIGNIFICANT CHANGE UP (ref 5–17)
ANION GAP SERPL CALC-SCNC: 13 MMOL/L — SIGNIFICANT CHANGE UP (ref 5–17)
ANION GAP SERPL CALC-SCNC: 13 MMOL/L — SIGNIFICANT CHANGE UP (ref 5–17)
APPEARANCE UR: CLEAR — SIGNIFICANT CHANGE UP
APPEARANCE UR: CLEAR — SIGNIFICANT CHANGE UP
APTT BLD: 31.7 SEC — SIGNIFICANT CHANGE UP (ref 24.5–35.6)
APTT BLD: 31.7 SEC — SIGNIFICANT CHANGE UP (ref 24.5–35.6)
AST SERPL-CCNC: 35 U/L — HIGH
AST SERPL-CCNC: 35 U/L — HIGH
B PERT DNA SPEC QL NAA+PROBE: SIGNIFICANT CHANGE UP
B PERT DNA SPEC QL NAA+PROBE: SIGNIFICANT CHANGE UP
BASE EXCESS BLDV CALC-SCNC: 10.3 MMOL/L — HIGH (ref -2–3)
BASE EXCESS BLDV CALC-SCNC: 10.3 MMOL/L — HIGH (ref -2–3)
BASOPHILS # BLD AUTO: 0.07 K/UL — SIGNIFICANT CHANGE UP (ref 0–0.2)
BASOPHILS # BLD AUTO: 0.07 K/UL — SIGNIFICANT CHANGE UP (ref 0–0.2)
BASOPHILS NFR BLD AUTO: 0.6 % — SIGNIFICANT CHANGE UP (ref 0–2)
BASOPHILS NFR BLD AUTO: 0.6 % — SIGNIFICANT CHANGE UP (ref 0–2)
BILIRUB SERPL-MCNC: 0.5 MG/DL — SIGNIFICANT CHANGE UP (ref 0.4–2)
BILIRUB SERPL-MCNC: 0.5 MG/DL — SIGNIFICANT CHANGE UP (ref 0.4–2)
BILIRUB UR-MCNC: NEGATIVE — SIGNIFICANT CHANGE UP
BILIRUB UR-MCNC: NEGATIVE — SIGNIFICANT CHANGE UP
BUN SERPL-MCNC: 35.4 MG/DL — HIGH (ref 8–20)
BUN SERPL-MCNC: 35.4 MG/DL — HIGH (ref 8–20)
BUN SERPL-MCNC: 37.7 MG/DL — HIGH (ref 8–20)
BUN SERPL-MCNC: 37.7 MG/DL — HIGH (ref 8–20)
C PNEUM DNA SPEC QL NAA+PROBE: SIGNIFICANT CHANGE UP
C PNEUM DNA SPEC QL NAA+PROBE: SIGNIFICANT CHANGE UP
CA-I SERPL-SCNC: 1 MMOL/L — LOW (ref 1.15–1.33)
CA-I SERPL-SCNC: 1 MMOL/L — LOW (ref 1.15–1.33)
CALCIUM SERPL-MCNC: 10 MG/DL — SIGNIFICANT CHANGE UP (ref 8.4–10.5)
CALCIUM SERPL-MCNC: 10 MG/DL — SIGNIFICANT CHANGE UP (ref 8.4–10.5)
CALCIUM SERPL-MCNC: 9.8 MG/DL — SIGNIFICANT CHANGE UP (ref 8.4–10.5)
CALCIUM SERPL-MCNC: 9.8 MG/DL — SIGNIFICANT CHANGE UP (ref 8.4–10.5)
CHLORIDE BLDV-SCNC: 97 MMOL/L — SIGNIFICANT CHANGE UP (ref 96–108)
CHLORIDE BLDV-SCNC: 97 MMOL/L — SIGNIFICANT CHANGE UP (ref 96–108)
CHLORIDE SERPL-SCNC: 92 MMOL/L — LOW (ref 96–108)
CHLORIDE SERPL-SCNC: 92 MMOL/L — LOW (ref 96–108)
CHLORIDE SERPL-SCNC: 93 MMOL/L — LOW (ref 96–108)
CHLORIDE SERPL-SCNC: 93 MMOL/L — LOW (ref 96–108)
CO2 SERPL-SCNC: 32 MMOL/L — HIGH (ref 22–29)
COLOR SPEC: YELLOW — SIGNIFICANT CHANGE UP
COLOR SPEC: YELLOW — SIGNIFICANT CHANGE UP
CREAT SERPL-MCNC: 1.37 MG/DL — HIGH (ref 0.5–1.3)
CREAT SERPL-MCNC: 1.37 MG/DL — HIGH (ref 0.5–1.3)
CREAT SERPL-MCNC: 1.44 MG/DL — HIGH (ref 0.5–1.3)
CREAT SERPL-MCNC: 1.44 MG/DL — HIGH (ref 0.5–1.3)
DIFF PNL FLD: NEGATIVE — SIGNIFICANT CHANGE UP
DIFF PNL FLD: NEGATIVE — SIGNIFICANT CHANGE UP
EGFR: 39 ML/MIN/1.73M2 — LOW
EGFR: 39 ML/MIN/1.73M2 — LOW
EGFR: 42 ML/MIN/1.73M2 — LOW
EGFR: 42 ML/MIN/1.73M2 — LOW
EOSINOPHIL # BLD AUTO: 0.27 K/UL — SIGNIFICANT CHANGE UP (ref 0–0.5)
EOSINOPHIL # BLD AUTO: 0.27 K/UL — SIGNIFICANT CHANGE UP (ref 0–0.5)
EOSINOPHIL NFR BLD AUTO: 2.2 % — SIGNIFICANT CHANGE UP (ref 0–6)
EOSINOPHIL NFR BLD AUTO: 2.2 % — SIGNIFICANT CHANGE UP (ref 0–6)
FLUAV H1 2009 PAND RNA SPEC QL NAA+PROBE: SIGNIFICANT CHANGE UP
FLUAV H1 2009 PAND RNA SPEC QL NAA+PROBE: SIGNIFICANT CHANGE UP
FLUAV H1 RNA SPEC QL NAA+PROBE: SIGNIFICANT CHANGE UP
FLUAV H1 RNA SPEC QL NAA+PROBE: SIGNIFICANT CHANGE UP
FLUAV H3 RNA SPEC QL NAA+PROBE: SIGNIFICANT CHANGE UP
FLUAV H3 RNA SPEC QL NAA+PROBE: SIGNIFICANT CHANGE UP
FLUAV SUBTYP SPEC NAA+PROBE: SIGNIFICANT CHANGE UP
FLUAV SUBTYP SPEC NAA+PROBE: SIGNIFICANT CHANGE UP
FLUBV RNA SPEC QL NAA+PROBE: SIGNIFICANT CHANGE UP
FLUBV RNA SPEC QL NAA+PROBE: SIGNIFICANT CHANGE UP
GAS PNL BLDA: SIGNIFICANT CHANGE UP
GAS PNL BLDA: SIGNIFICANT CHANGE UP
GAS PNL BLDV: 132 MMOL/L — LOW (ref 136–145)
GAS PNL BLDV: 132 MMOL/L — LOW (ref 136–145)
GAS PNL BLDV: SIGNIFICANT CHANGE UP
GAS PNL BLDV: SIGNIFICANT CHANGE UP
GLUCOSE BLDC GLUCOMTR-MCNC: 377 MG/DL — HIGH (ref 70–99)
GLUCOSE BLDC GLUCOMTR-MCNC: 377 MG/DL — HIGH (ref 70–99)
GLUCOSE BLDV-MCNC: 295 MG/DL — HIGH (ref 70–99)
GLUCOSE BLDV-MCNC: 295 MG/DL — HIGH (ref 70–99)
GLUCOSE SERPL-MCNC: 273 MG/DL — HIGH (ref 70–99)
GLUCOSE SERPL-MCNC: 273 MG/DL — HIGH (ref 70–99)
GLUCOSE SERPL-MCNC: 316 MG/DL — HIGH (ref 70–99)
GLUCOSE SERPL-MCNC: 316 MG/DL — HIGH (ref 70–99)
GLUCOSE UR QL: NEGATIVE MG/DL — SIGNIFICANT CHANGE UP
GLUCOSE UR QL: NEGATIVE MG/DL — SIGNIFICANT CHANGE UP
HADV DNA SPEC QL NAA+PROBE: SIGNIFICANT CHANGE UP
HADV DNA SPEC QL NAA+PROBE: SIGNIFICANT CHANGE UP
HCO3 BLDV-SCNC: 34 MMOL/L — HIGH (ref 22–29)
HCO3 BLDV-SCNC: 34 MMOL/L — HIGH (ref 22–29)
HCOV PNL SPEC NAA+PROBE: SIGNIFICANT CHANGE UP
HCOV PNL SPEC NAA+PROBE: SIGNIFICANT CHANGE UP
HCT VFR BLD CALC: 41.1 % — SIGNIFICANT CHANGE UP (ref 34.5–45)
HCT VFR BLD CALC: 41.1 % — SIGNIFICANT CHANGE UP (ref 34.5–45)
HCT VFR BLDA CALC: 41 % — SIGNIFICANT CHANGE UP
HCT VFR BLDA CALC: 41 % — SIGNIFICANT CHANGE UP
HGB BLD CALC-MCNC: 13.7 G/DL — SIGNIFICANT CHANGE UP (ref 11.7–16.1)
HGB BLD CALC-MCNC: 13.7 G/DL — SIGNIFICANT CHANGE UP (ref 11.7–16.1)
HGB BLD-MCNC: 12.9 G/DL — SIGNIFICANT CHANGE UP (ref 11.5–15.5)
HGB BLD-MCNC: 12.9 G/DL — SIGNIFICANT CHANGE UP (ref 11.5–15.5)
HMPV RNA SPEC QL NAA+PROBE: SIGNIFICANT CHANGE UP
HMPV RNA SPEC QL NAA+PROBE: SIGNIFICANT CHANGE UP
HPIV1 RNA SPEC QL NAA+PROBE: SIGNIFICANT CHANGE UP
HPIV1 RNA SPEC QL NAA+PROBE: SIGNIFICANT CHANGE UP
HPIV2 RNA SPEC QL NAA+PROBE: SIGNIFICANT CHANGE UP
HPIV2 RNA SPEC QL NAA+PROBE: SIGNIFICANT CHANGE UP
HPIV3 RNA SPEC QL NAA+PROBE: SIGNIFICANT CHANGE UP
HPIV3 RNA SPEC QL NAA+PROBE: SIGNIFICANT CHANGE UP
HPIV4 RNA SPEC QL NAA+PROBE: SIGNIFICANT CHANGE UP
HPIV4 RNA SPEC QL NAA+PROBE: SIGNIFICANT CHANGE UP
IMM GRANULOCYTES NFR BLD AUTO: 1.1 % — HIGH (ref 0–0.9)
IMM GRANULOCYTES NFR BLD AUTO: 1.1 % — HIGH (ref 0–0.9)
INR BLD: 1.33 RATIO — HIGH (ref 0.85–1.18)
INR BLD: 1.33 RATIO — HIGH (ref 0.85–1.18)
KETONES UR-MCNC: NEGATIVE MG/DL — SIGNIFICANT CHANGE UP
KETONES UR-MCNC: NEGATIVE MG/DL — SIGNIFICANT CHANGE UP
LACTATE BLDV-MCNC: 2.4 MMOL/L — HIGH (ref 0.5–2)
LACTATE BLDV-MCNC: 2.4 MMOL/L — HIGH (ref 0.5–2)
LEUKOCYTE ESTERASE UR-ACNC: NEGATIVE — SIGNIFICANT CHANGE UP
LEUKOCYTE ESTERASE UR-ACNC: NEGATIVE — SIGNIFICANT CHANGE UP
LIDOCAIN IGE QN: 12 U/L — LOW (ref 22–51)
LIDOCAIN IGE QN: 12 U/L — LOW (ref 22–51)
LYMPHOCYTES # BLD AUTO: 1.06 K/UL — SIGNIFICANT CHANGE UP (ref 1–3.3)
LYMPHOCYTES # BLD AUTO: 1.06 K/UL — SIGNIFICANT CHANGE UP (ref 1–3.3)
LYMPHOCYTES # BLD AUTO: 8.8 % — LOW (ref 13–44)
LYMPHOCYTES # BLD AUTO: 8.8 % — LOW (ref 13–44)
MAGNESIUM SERPL-MCNC: 2 MG/DL — SIGNIFICANT CHANGE UP (ref 1.8–2.6)
MAGNESIUM SERPL-MCNC: 2 MG/DL — SIGNIFICANT CHANGE UP (ref 1.8–2.6)
MAGNESIUM SERPL-MCNC: 2.3 MG/DL — SIGNIFICANT CHANGE UP (ref 1.6–2.6)
MAGNESIUM SERPL-MCNC: 2.3 MG/DL — SIGNIFICANT CHANGE UP (ref 1.6–2.6)
MCHC RBC-ENTMCNC: 29 PG — SIGNIFICANT CHANGE UP (ref 27–34)
MCHC RBC-ENTMCNC: 29 PG — SIGNIFICANT CHANGE UP (ref 27–34)
MCHC RBC-ENTMCNC: 31.4 GM/DL — LOW (ref 32–36)
MCHC RBC-ENTMCNC: 31.4 GM/DL — LOW (ref 32–36)
MCV RBC AUTO: 92.4 FL — SIGNIFICANT CHANGE UP (ref 80–100)
MCV RBC AUTO: 92.4 FL — SIGNIFICANT CHANGE UP (ref 80–100)
MONOCYTES # BLD AUTO: 0.84 K/UL — SIGNIFICANT CHANGE UP (ref 0–0.9)
MONOCYTES # BLD AUTO: 0.84 K/UL — SIGNIFICANT CHANGE UP (ref 0–0.9)
MONOCYTES NFR BLD AUTO: 7 % — SIGNIFICANT CHANGE UP (ref 2–14)
MONOCYTES NFR BLD AUTO: 7 % — SIGNIFICANT CHANGE UP (ref 2–14)
NEUTROPHILS # BLD AUTO: 9.71 K/UL — HIGH (ref 1.8–7.4)
NEUTROPHILS # BLD AUTO: 9.71 K/UL — HIGH (ref 1.8–7.4)
NEUTROPHILS NFR BLD AUTO: 80.3 % — HIGH (ref 43–77)
NEUTROPHILS NFR BLD AUTO: 80.3 % — HIGH (ref 43–77)
NITRITE UR-MCNC: NEGATIVE — SIGNIFICANT CHANGE UP
NITRITE UR-MCNC: NEGATIVE — SIGNIFICANT CHANGE UP
NT-PROBNP SERPL-SCNC: 1650 PG/ML — HIGH (ref 0–300)
NT-PROBNP SERPL-SCNC: 1650 PG/ML — HIGH (ref 0–300)
PCO2 BLDV: 43 MMHG — HIGH (ref 39–42)
PCO2 BLDV: 43 MMHG — HIGH (ref 39–42)
PH BLDV: 7.5 — HIGH (ref 7.32–7.43)
PH BLDV: 7.5 — HIGH (ref 7.32–7.43)
PH UR: 6 — SIGNIFICANT CHANGE UP (ref 5–8)
PH UR: 6 — SIGNIFICANT CHANGE UP (ref 5–8)
PLATELET # BLD AUTO: 228 K/UL — SIGNIFICANT CHANGE UP (ref 150–400)
PLATELET # BLD AUTO: 228 K/UL — SIGNIFICANT CHANGE UP (ref 150–400)
PO2 BLDV: 160 MMHG — HIGH (ref 25–45)
PO2 BLDV: 160 MMHG — HIGH (ref 25–45)
POTASSIUM BLDV-SCNC: 5.1 MMOL/L — SIGNIFICANT CHANGE UP (ref 3.5–5.1)
POTASSIUM BLDV-SCNC: 5.1 MMOL/L — SIGNIFICANT CHANGE UP (ref 3.5–5.1)
POTASSIUM SERPL-MCNC: 4.2 MMOL/L — SIGNIFICANT CHANGE UP (ref 3.5–5.3)
POTASSIUM SERPL-MCNC: 4.2 MMOL/L — SIGNIFICANT CHANGE UP (ref 3.5–5.3)
POTASSIUM SERPL-MCNC: 4.5 MMOL/L — SIGNIFICANT CHANGE UP (ref 3.5–5.3)
POTASSIUM SERPL-MCNC: 4.5 MMOL/L — SIGNIFICANT CHANGE UP (ref 3.5–5.3)
POTASSIUM SERPL-SCNC: 4.2 MMOL/L — SIGNIFICANT CHANGE UP (ref 3.5–5.3)
POTASSIUM SERPL-SCNC: 4.2 MMOL/L — SIGNIFICANT CHANGE UP (ref 3.5–5.3)
POTASSIUM SERPL-SCNC: 4.5 MMOL/L — SIGNIFICANT CHANGE UP (ref 3.5–5.3)
POTASSIUM SERPL-SCNC: 4.5 MMOL/L — SIGNIFICANT CHANGE UP (ref 3.5–5.3)
PROT SERPL-MCNC: 7.2 G/DL — SIGNIFICANT CHANGE UP (ref 6.6–8.7)
PROT SERPL-MCNC: 7.2 G/DL — SIGNIFICANT CHANGE UP (ref 6.6–8.7)
PROT UR-MCNC: NEGATIVE MG/DL — SIGNIFICANT CHANGE UP
PROT UR-MCNC: NEGATIVE MG/DL — SIGNIFICANT CHANGE UP
PROTHROM AB SERPL-ACNC: 14.6 SEC — HIGH (ref 9.5–13)
PROTHROM AB SERPL-ACNC: 14.6 SEC — HIGH (ref 9.5–13)
RAPID RVP RESULT: DETECTED
RAPID RVP RESULT: DETECTED
RBC # BLD: 4.45 M/UL — SIGNIFICANT CHANGE UP (ref 3.8–5.2)
RBC # BLD: 4.45 M/UL — SIGNIFICANT CHANGE UP (ref 3.8–5.2)
RBC # FLD: 14.1 % — SIGNIFICANT CHANGE UP (ref 10.3–14.5)
RBC # FLD: 14.1 % — SIGNIFICANT CHANGE UP (ref 10.3–14.5)
RSV RNA SPEC QL NAA+PROBE: DETECTED
RSV RNA SPEC QL NAA+PROBE: DETECTED
RV+EV RNA SPEC QL NAA+PROBE: SIGNIFICANT CHANGE UP
RV+EV RNA SPEC QL NAA+PROBE: SIGNIFICANT CHANGE UP
SAO2 % BLDV: 98.6 % — SIGNIFICANT CHANGE UP
SAO2 % BLDV: 98.6 % — SIGNIFICANT CHANGE UP
SARS-COV-2 RNA SPEC QL NAA+PROBE: SIGNIFICANT CHANGE UP
SARS-COV-2 RNA SPEC QL NAA+PROBE: SIGNIFICANT CHANGE UP
SODIUM SERPL-SCNC: 136 MMOL/L — SIGNIFICANT CHANGE UP (ref 135–145)
SODIUM SERPL-SCNC: 136 MMOL/L — SIGNIFICANT CHANGE UP (ref 135–145)
SODIUM SERPL-SCNC: 138 MMOL/L — SIGNIFICANT CHANGE UP (ref 135–145)
SODIUM SERPL-SCNC: 138 MMOL/L — SIGNIFICANT CHANGE UP (ref 135–145)
SP GR SPEC: 1.01 — SIGNIFICANT CHANGE UP (ref 1–1.03)
SP GR SPEC: 1.01 — SIGNIFICANT CHANGE UP (ref 1–1.03)
TROPONIN T, HIGH SENSITIVITY RESULT: 45 NG/L — SIGNIFICANT CHANGE UP (ref 0–51)
TSH SERPL-MCNC: 1.51 UIU/ML — SIGNIFICANT CHANGE UP (ref 0.27–4.2)
TSH SERPL-MCNC: 1.51 UIU/ML — SIGNIFICANT CHANGE UP (ref 0.27–4.2)
TSH SERPL-MCNC: 3.02 UIU/ML — SIGNIFICANT CHANGE UP (ref 0.27–4.2)
TSH SERPL-MCNC: 3.02 UIU/ML — SIGNIFICANT CHANGE UP (ref 0.27–4.2)
UROBILINOGEN FLD QL: 0.2 MG/DL — SIGNIFICANT CHANGE UP (ref 0.2–1)
UROBILINOGEN FLD QL: 0.2 MG/DL — SIGNIFICANT CHANGE UP (ref 0.2–1)
WBC # BLD: 12.08 K/UL — HIGH (ref 3.8–10.5)
WBC # BLD: 12.08 K/UL — HIGH (ref 3.8–10.5)
WBC # FLD AUTO: 12.08 K/UL — HIGH (ref 3.8–10.5)
WBC # FLD AUTO: 12.08 K/UL — HIGH (ref 3.8–10.5)

## 2023-12-16 PROCEDURE — 99291 CRITICAL CARE FIRST HOUR: CPT

## 2023-12-16 PROCEDURE — 71250 CT THORAX DX C-: CPT | Mod: 26,MA

## 2023-12-16 PROCEDURE — 93010 ELECTROCARDIOGRAM REPORT: CPT

## 2023-12-16 PROCEDURE — 71045 X-RAY EXAM CHEST 1 VIEW: CPT | Mod: 26

## 2023-12-16 PROCEDURE — 99223 1ST HOSP IP/OBS HIGH 75: CPT

## 2023-12-16 RX ORDER — POLYETHYLENE GLYCOL 3350 17 G/17G
17 POWDER, FOR SOLUTION ORAL DAILY
Refills: 0 | Status: DISCONTINUED | OUTPATIENT
Start: 2023-12-16 | End: 2024-01-08

## 2023-12-16 RX ORDER — FUROSEMIDE 40 MG
40 TABLET ORAL
Refills: 0 | Status: DISCONTINUED | OUTPATIENT
Start: 2023-12-16 | End: 2023-12-16

## 2023-12-16 RX ORDER — DEXTROSE 50 % IN WATER 50 %
12.5 SYRINGE (ML) INTRAVENOUS ONCE
Refills: 0 | Status: DISCONTINUED | OUTPATIENT
Start: 2023-12-16 | End: 2024-01-08

## 2023-12-16 RX ORDER — SENNA PLUS 8.6 MG/1
2 TABLET ORAL AT BEDTIME
Refills: 0 | Status: DISCONTINUED | OUTPATIENT
Start: 2023-12-16 | End: 2024-01-08

## 2023-12-16 RX ORDER — PIPERACILLIN AND TAZOBACTAM 4; .5 G/20ML; G/20ML
3.38 INJECTION, POWDER, LYOPHILIZED, FOR SOLUTION INTRAVENOUS ONCE
Refills: 0 | Status: DISCONTINUED | OUTPATIENT
Start: 2023-12-17 | End: 2023-12-23

## 2023-12-16 RX ORDER — LANOLIN ALCOHOL/MO/W.PET/CERES
3 CREAM (GRAM) TOPICAL AT BEDTIME
Refills: 0 | Status: DISCONTINUED | OUTPATIENT
Start: 2023-12-16 | End: 2024-01-08

## 2023-12-16 RX ORDER — OXYCODONE HYDROCHLORIDE 5 MG/1
60 TABLET ORAL EVERY 12 HOURS
Refills: 0 | Status: DISCONTINUED | OUTPATIENT
Start: 2023-12-16 | End: 2023-12-22

## 2023-12-16 RX ORDER — DEXTROSE 50 % IN WATER 50 %
25 SYRINGE (ML) INTRAVENOUS ONCE
Refills: 0 | Status: DISCONTINUED | OUTPATIENT
Start: 2023-12-16 | End: 2024-01-08

## 2023-12-16 RX ORDER — IPRATROPIUM/ALBUTEROL SULFATE 18-103MCG
3 AEROSOL WITH ADAPTER (GRAM) INHALATION
Refills: 0 | Status: COMPLETED | OUTPATIENT
Start: 2023-12-16 | End: 2023-12-16

## 2023-12-16 RX ORDER — DIPHENHYDRAMINE HCL 50 MG
1 CAPSULE ORAL
Refills: 0 | DISCHARGE

## 2023-12-16 RX ORDER — DIPHENHYDRAMINE HCL 50 MG
25 CAPSULE ORAL EVERY 6 HOURS
Refills: 0 | Status: DISCONTINUED | OUTPATIENT
Start: 2023-12-16 | End: 2024-01-08

## 2023-12-16 RX ORDER — GLUCAGON INJECTION, SOLUTION 0.5 MG/.1ML
1 INJECTION, SOLUTION SUBCUTANEOUS ONCE
Refills: 0 | Status: DISCONTINUED | OUTPATIENT
Start: 2023-12-16 | End: 2024-01-08

## 2023-12-16 RX ORDER — ALBUTEROL 90 UG/1
2 AEROSOL, METERED ORAL
Refills: 0 | Status: DISCONTINUED | OUTPATIENT
Start: 2023-12-16 | End: 2023-12-30

## 2023-12-16 RX ORDER — GLUCAGON INJECTION, SOLUTION 0.5 MG/.1ML
1 INJECTION, SOLUTION SUBCUTANEOUS ONCE
Refills: 0 | Status: DISCONTINUED | OUTPATIENT
Start: 2023-12-16 | End: 2023-12-16

## 2023-12-16 RX ORDER — PIPERACILLIN AND TAZOBACTAM 4; .5 G/20ML; G/20ML
3.38 INJECTION, POWDER, LYOPHILIZED, FOR SOLUTION INTRAVENOUS EVERY 8 HOURS
Refills: 0 | Status: DISCONTINUED | OUTPATIENT
Start: 2023-12-17 | End: 2023-12-23

## 2023-12-16 RX ORDER — ONDANSETRON 8 MG/1
4 TABLET, FILM COATED ORAL EVERY 8 HOURS
Refills: 0 | Status: DISCONTINUED | OUTPATIENT
Start: 2023-12-16 | End: 2024-01-08

## 2023-12-16 RX ORDER — INSULIN LISPRO 100/ML
VIAL (ML) SUBCUTANEOUS EVERY 6 HOURS
Refills: 0 | Status: DISCONTINUED | OUTPATIENT
Start: 2023-12-16 | End: 2023-12-22

## 2023-12-16 RX ORDER — DEXTROSE 50 % IN WATER 50 %
15 SYRINGE (ML) INTRAVENOUS ONCE
Refills: 0 | Status: DISCONTINUED | OUTPATIENT
Start: 2023-12-16 | End: 2023-12-16

## 2023-12-16 RX ORDER — DEXTROSE 50 % IN WATER 50 %
25 SYRINGE (ML) INTRAVENOUS ONCE
Refills: 0 | Status: DISCONTINUED | OUTPATIENT
Start: 2023-12-16 | End: 2023-12-16

## 2023-12-16 RX ORDER — FUROSEMIDE 40 MG
40 TABLET ORAL ONCE
Refills: 0 | Status: COMPLETED | OUTPATIENT
Start: 2023-12-16 | End: 2023-12-16

## 2023-12-16 RX ORDER — PIPERACILLIN AND TAZOBACTAM 4; .5 G/20ML; G/20ML
3.38 INJECTION, POWDER, LYOPHILIZED, FOR SOLUTION INTRAVENOUS ONCE
Refills: 0 | Status: COMPLETED | OUTPATIENT
Start: 2023-12-16 | End: 2023-12-16

## 2023-12-16 RX ORDER — APIXABAN 2.5 MG/1
5 TABLET, FILM COATED ORAL EVERY 12 HOURS
Refills: 0 | Status: DISCONTINUED | OUTPATIENT
Start: 2023-12-16 | End: 2023-12-16

## 2023-12-16 RX ORDER — DEXTROSE 50 % IN WATER 50 %
12.5 SYRINGE (ML) INTRAVENOUS ONCE
Refills: 0 | Status: DISCONTINUED | OUTPATIENT
Start: 2023-12-16 | End: 2023-12-16

## 2023-12-16 RX ORDER — INSULIN GLARGINE 100 [IU]/ML
35 INJECTION, SOLUTION SUBCUTANEOUS AT BEDTIME
Refills: 0 | Status: DISCONTINUED | OUTPATIENT
Start: 2023-12-16 | End: 2023-12-16

## 2023-12-16 RX ORDER — ALBUTEROL 90 UG/1
2 AEROSOL, METERED ORAL EVERY 6 HOURS
Refills: 0 | Status: DISCONTINUED | OUTPATIENT
Start: 2023-12-16 | End: 2023-12-16

## 2023-12-16 RX ORDER — INSULIN LISPRO 100/ML
VIAL (ML) SUBCUTANEOUS
Refills: 0 | Status: DISCONTINUED | OUTPATIENT
Start: 2023-12-16 | End: 2023-12-16

## 2023-12-16 RX ORDER — INSULIN GLARGINE 100 [IU]/ML
35 INJECTION, SOLUTION SUBCUTANEOUS AT BEDTIME
Refills: 0 | Status: DISCONTINUED | OUTPATIENT
Start: 2023-12-16 | End: 2023-12-17

## 2023-12-16 RX ORDER — DEXTROSE 50 % IN WATER 50 %
15 SYRINGE (ML) INTRAVENOUS ONCE
Refills: 0 | Status: DISCONTINUED | OUTPATIENT
Start: 2023-12-16 | End: 2024-01-08

## 2023-12-16 RX ORDER — BUDESONIDE AND FORMOTEROL FUMARATE DIHYDRATE 160; 4.5 UG/1; UG/1
2 AEROSOL RESPIRATORY (INHALATION)
Refills: 0 | Status: DISCONTINUED | OUTPATIENT
Start: 2023-12-16 | End: 2024-01-08

## 2023-12-16 RX ORDER — INSULIN LISPRO 100/ML
VIAL (ML) SUBCUTANEOUS AT BEDTIME
Refills: 0 | Status: DISCONTINUED | OUTPATIENT
Start: 2023-12-16 | End: 2023-12-16

## 2023-12-16 RX ORDER — ACETAMINOPHEN 500 MG
1000 TABLET ORAL ONCE
Refills: 0 | Status: COMPLETED | OUTPATIENT
Start: 2023-12-16 | End: 2023-12-16

## 2023-12-16 RX ORDER — LACTULOSE 10 G/15ML
20 SOLUTION ORAL DAILY
Refills: 0 | Status: DISCONTINUED | OUTPATIENT
Start: 2023-12-16 | End: 2024-01-08

## 2023-12-16 RX ORDER — TIOTROPIUM BROMIDE 18 UG/1
2 CAPSULE ORAL; RESPIRATORY (INHALATION) DAILY
Refills: 0 | Status: DISCONTINUED | OUTPATIENT
Start: 2023-12-16 | End: 2023-12-22

## 2023-12-16 RX ORDER — SODIUM CHLORIDE 9 MG/ML
1000 INJECTION, SOLUTION INTRAVENOUS
Refills: 0 | Status: DISCONTINUED | OUTPATIENT
Start: 2023-12-16 | End: 2024-01-08

## 2023-12-16 RX ORDER — INSULIN LISPRO 100/ML
10 VIAL (ML) SUBCUTANEOUS
Refills: 0 | Status: DISCONTINUED | OUTPATIENT
Start: 2023-12-16 | End: 2023-12-16

## 2023-12-16 RX ORDER — ALPRAZOLAM 0.25 MG
0.25 TABLET ORAL AT BEDTIME
Refills: 0 | Status: DISCONTINUED | OUTPATIENT
Start: 2023-12-16 | End: 2023-12-20

## 2023-12-16 RX ORDER — OXYCODONE HYDROCHLORIDE 5 MG/1
5 TABLET ORAL EVERY 6 HOURS
Refills: 0 | Status: DISCONTINUED | OUTPATIENT
Start: 2023-12-16 | End: 2023-12-22

## 2023-12-16 RX ORDER — SODIUM CHLORIDE 9 MG/ML
1000 INJECTION, SOLUTION INTRAVENOUS
Refills: 0 | Status: DISCONTINUED | OUTPATIENT
Start: 2023-12-16 | End: 2023-12-16

## 2023-12-16 RX ORDER — ACETAMINOPHEN 500 MG
650 TABLET ORAL EVERY 6 HOURS
Refills: 0 | Status: DISCONTINUED | OUTPATIENT
Start: 2023-12-16 | End: 2024-01-08

## 2023-12-16 RX ORDER — MAGNESIUM SULFATE 500 MG/ML
2 VIAL (ML) INJECTION ONCE
Refills: 0 | Status: COMPLETED | OUTPATIENT
Start: 2023-12-16 | End: 2023-12-16

## 2023-12-16 RX ORDER — ATORVASTATIN CALCIUM 80 MG/1
80 TABLET, FILM COATED ORAL AT BEDTIME
Refills: 0 | Status: DISCONTINUED | OUTPATIENT
Start: 2023-12-16 | End: 2024-01-08

## 2023-12-16 RX ORDER — FUROSEMIDE 40 MG
40 TABLET ORAL DAILY
Refills: 0 | Status: DISCONTINUED | OUTPATIENT
Start: 2023-12-17 | End: 2023-12-17

## 2023-12-16 RX ADMIN — PIPERACILLIN AND TAZOBACTAM 200 GRAM(S): 4; .5 INJECTION, POWDER, LYOPHILIZED, FOR SOLUTION INTRAVENOUS at 15:21

## 2023-12-16 RX ADMIN — Medication 3 MILLILITER(S): at 10:15

## 2023-12-16 RX ADMIN — Medication 3 MILLILITER(S): at 10:30

## 2023-12-16 RX ADMIN — OXYCODONE HYDROCHLORIDE 5 MILLIGRAM(S): 5 TABLET ORAL at 21:23

## 2023-12-16 RX ADMIN — Medication 3 MILLILITER(S): at 10:40

## 2023-12-16 RX ADMIN — Medication 100 MILLIGRAM(S): at 23:35

## 2023-12-16 RX ADMIN — Medication 400 MILLIGRAM(S): at 10:49

## 2023-12-16 RX ADMIN — Medication 2 GRAM(S): at 11:12

## 2023-12-16 RX ADMIN — Medication 10: at 17:17

## 2023-12-16 RX ADMIN — Medication 40 MILLIGRAM(S): at 12:58

## 2023-12-16 RX ADMIN — PIPERACILLIN AND TAZOBACTAM 25 GRAM(S): 4; .5 INJECTION, POWDER, LYOPHILIZED, FOR SOLUTION INTRAVENOUS at 17:45

## 2023-12-16 RX ADMIN — ALBUTEROL 2 PUFF(S): 90 AEROSOL, METERED ORAL at 20:51

## 2023-12-16 RX ADMIN — Medication 40 MILLIGRAM(S): at 10:34

## 2023-12-16 RX ADMIN — Medication 150 GRAM(S): at 10:49

## 2023-12-16 RX ADMIN — Medication 125 MILLIGRAM(S): at 10:34

## 2023-12-16 RX ADMIN — OXYCODONE HYDROCHLORIDE 60 MILLIGRAM(S): 5 TABLET ORAL at 18:15

## 2023-12-16 RX ADMIN — Medication 1000 MILLIGRAM(S): at 11:30

## 2023-12-16 RX ADMIN — OXYCODONE HYDROCHLORIDE 60 MILLIGRAM(S): 5 TABLET ORAL at 17:20

## 2023-12-16 RX ADMIN — Medication 1000 MILLIGRAM(S): at 11:53

## 2023-12-16 NOTE — CONSULT NOTE ADULT - ASSESSMENT
68F h/o HTN, DM, Oxygen dependence 4L NC, morbid obesity, uterine ca, ckd recently admitted for anal fistula and dvt started on eliquis and tx to rehab presents after being dx with bronchitis/pna at rehab with sob.  started on bipap mask.  NAD    pulm edema  r/o pna     -continue with bipap for now, please ensure adequately sized mask and seal for appropriate support.  can wean as tolerated  -aggresive diuresis  -cardiology consult  -echo  -ct chest noncontrast  -no indication for micu admission at this time.  case d/w dr Eng in agreement with plan, please reconsult as tolerated.

## 2023-12-16 NOTE — ED ADULT NURSE REASSESSMENT NOTE - NS ED NURSE REASSESS COMMENT FT1
ED resident Rey still at bedside attempting to place ultrasound guided IV.
PT is A&Ox4, PT resting comfortably in Ed stretcher with out complaints of chest pain currently tolerating NC and sating 98%, skin warm dry and unremarkable. Updated pt on plan of care and pt expresses understanding.
Pt c/o increased shortness of breath after stopping BIPAP and on 4lpm NC.  Respiratory therapist at bedside.  Dr. Eng from MICU at bedside discussing moving from BIPAP to NC and back to BIPAP if needed and avoiding high flow oxygen.  Oxygen saturation 97% via Oxygen at 4lpm on NC.  Respiratory rate 26.  Able to speak in full sentences with MD. Pt verbalized understanding and will continue with the NC at this time.
Pt c/o increased work of breathing. Dr. Eng (Hospitalist) at bedside along with respiratory therapist to place pt back on BIPAP.  Dr. Eng upgrading to stepdown ICU.  Oxygen saturation 99% on BIPAP and tolerating it well.  Side rails up.  Pt verbalized understanding of plan of care and was given an opportunity to ask questions which were answered.
Pt currently off BIPAP and back on NC at 4lpm.  Tolerating well.  Has respiratory mask in place.  Alert and oriented X 4. [place] draining clear, keyanna urine; 1600ml emptied from de la torre bag and recorded on flowsheet.  Pt using cellphone without difficulty.  All belongings in large clear bag, labelled, and is on back of stretcher. Side rails up.
Tolerating BIPAP.  Oleary catheter draining clear, keyanna urine.  Awaiting CT results and admission.

## 2023-12-16 NOTE — ED PROVIDER NOTE - ATTENDING CONTRIBUTION TO CARE
Daniela EUBANKSRM-16-pgy[age]-old female with history of morbid obese obesity CHF hypertension hyperlipidemia recent admission for rectal fissure, CKD stage III, diabetes, uterine cancer, DVT on Eliquis brought in by EMS for acute shortness of breath.  Patient has cough but no fever or chills and was recently admitted to the hospital and sent to the rehab.  Patient has never been on CPAP or BiPAP.    Patient is ill-appearing chronically obese patient with large abdomen with large pannus, has tachypnea and is unable to speak full sentences without interruption, bilateral profuse wheezing with moderate aeration, S1-S2 is regular, SpO2 is normal, abdomen is large with postsurgical scars on the right lower abdomen, nontender, neuro exam is alert oriented x 3 no focal deficits, patient's both lower legs appear to be chronically having some contracture from obesity and lack of movement    Patient is in acute respiratory distress appears to be fluid overloaded we will give her Lasix with her on BiPAP.  Patient has normal blood pressure at this time will rule out ACS check for electrolyte imbalance rule out pneumonia Daniela EUBANKSCX-79-qydz-old female with history of morbid obese obesity CHF hypertension hyperlipidemia recent admission for rectal fissure, CKD stage III, diabetes, uterine cancer, DVT on Eliquis brought in by EMS for acute shortness of breath.  Patient has cough but no fever or chills and was recently admitted to the hospital and sent to the rehab.  Patient has never been on CPAP or BiPAP.    Patient is ill-appearing chronically obese patient with large abdomen with large pannus, has tachypnea and is unable to speak full sentences without interruption, bilateral profuse wheezing with moderate aeration, S1-S2 is regular, SpO2 is normal, abdomen is large with postsurgical scars on the right lower abdomen, nontender, neuro exam is alert oriented x 3 no focal deficits, patient's both lower legs appear to be chronically having some contracture from obesity and lack of movement    Patient is in acute respiratory distress appears to be fluid overloaded we will give her Lasix with her on BiPAP.  Patient has normal blood pressure at this time will rule out ACS check for electrolyte imbalance rule out pneumonia

## 2023-12-16 NOTE — ED PROVIDER NOTE - CLINICAL SUMMARY MEDICAL DECISION MAKING FREE TEXT BOX
A 67 yo F w/ PMH of HTN, HLD, DM2, HFpEF (on 4L via NC baseline), CKD III, Uterine CA and recent hospitalizations at [facility] for rectal pain, morbid obese obesity, pulmonary hypertension, DVT on Eliquis, was brought in by EMS for acute shortness of breath. Patient has cough but no fever or chills and was recently admitted to [facility] and sent to the rehab. Also reports CP. In the ED, Pt was ill-appearing, tachypneic, and is unable to speak full sentences 2/2 sob, SpO2 is normal. As pt in acute respiratory distress appears to be fluid overloaded, BiPAP was started immediately after ED arrival. Patient has never been on CPAP or BiPAP. Denies fevers, chills, headache, nausea, vomiting, diarrhea, hematuria, dysuria, dark stools, focal neurologic symptoms.  Will check cbc, cmp, coag, tropT, BNP, ecg, cxr, CT chest, urine. Given, lasix 40mg x2, Mg, duoneb, solu-medrol 125mg. Given BiPAP applied for the 1 st time, MICU consulted but declined. Will admit to Med. A 67 yo F w/ PMH of HTN, HLD, DM2, HFpEF (on 4L via NC baseline), CKD III, Uterine CA and recent hospitalizations at [facility] for rectal pain, morbid obese obesity, pulmonary hypertension, DVT on Eliquis, was brought in by EMS for acute shortness of breath. Patient has cough but no fever or chills and was recently admitted to [facility] and sent to the rehab. Also reports CP. In the ED, Pt was ill-appearing, tachypneic, and is unable to speak full sentences 2/2 sob, SpO2 is normal. As pt in acute respiratory distress appears to be fluid overloaded, BiPAP was started immediately after ED arrival. Patient has never been on CPAP or BiPAP. Denies fevers, chills, headache, nausea, vomiting, diarrhea, hematuria, dysuria, dark stools, focal neurologic symptoms.  Will check cbc, cmp, coag, tropT, BNP, ecg, cxr, CT chest, urine. Given, lasix 40mg x2, Mg, duoneb, solu-medrol 125mg. Given BiPAP applied for the 1 st time, MICU consulted but declined. Will admit to Med.

## 2023-12-16 NOTE — H&P ADULT - NSHPPHYSICALEXAM_GEN_ALL_CORE
PHYSICAL EXAM:  Vital Signs Last 24 Hrs  T(F): 99 (16 Dec 2023 15:10), Max: 99 (16 Dec 2023 15:10)  HR: 74 (16 Dec 2023 15:10) (74 - 96)  BP: 137/86 (16 Dec 2023 15:10) (133/86 - 146/65)  RR: 20 (16 Dec 2023 15:10) (18 - 34)  SpO2: 97% (16 Dec 2023 15:10) (96% - 100%)    GENERAL: NAD, morbidly obese female seen while on BIPAP  Eyes: EOMI, PERRLA  ENMT: Conjunctiva and sclera clear; supple neck, No JVD  Cardiovascular: S1,S2, RRR, No murmur  Respiratory: Coarse breath sounds b/l  GI: Bowel sounds present; Soft, Nontender, Nondistended. No hepatomegaly  Genitourinary: Deferred  Skin:  no breakdowns, ulcers or discharge, LE Chronic venous stasis   Neurology: Alert & Oriented X3, non-focal and spontaneous movements of all extremities, CN 2 to 12 grossly intact   Psych: Appropriate mood and affect, calm, pleasant, Responds appropriately to questions

## 2023-12-16 NOTE — H&P ADULT - NSHPREVIEWOFSYSTEMS_GEN_ALL_CORE
Review of Systems:  CONSTITUTIONAL: No weakness, fevers or chills  EYES/ENT: No visual changes;  No vertigo or throat pain   NECK: No pain or stiffness  RESPIRATORY: +Cough and SOB  CARDIOVASCULAR: No chest pain or palpitations  GASTROINTESTINAL: No abdominal or epigastric pain. No nausea, vomiting, or hematemesis; No diarrhea or constipation.   GENITOURINARY: No dysuria, frequency or hematuria  NEUROLOGICAL: No numbness or weakness  SKIN: No itching, burning, rashes, or lesions   All other review of systems is negative unless indicated above

## 2023-12-16 NOTE — CONSULT NOTE ADULT - SUBJECTIVE AND OBJECTIVE BOX
REASON FOR CONSULT: Bipap    CONSULT REQUESTED BY: ED resident    Patient is a 68y old  Female who presents with a chief complaint of SOB. Pt states she was recently at rehab after being diagnoses with DVT/PE at [facility] where she was told she had bronchitis/pna and started on po abx.  Pt presents today c/o worsening SOB.  Denies CP fever.  Started on bipap by ED staff for work of breathing.  Upon exam pt conversing in full sentences, alert, without accessory muscle use.  states her breathing feels much better.          PAST MEDICAL & SURGICAL HISTORY:  Diabetes Mellitus Type II      HTN (Hypertension)      Endometrial Hyperplasia      Cervical Stenosis of Spine      Spinal Stenosis, Lumbar      Deep Vein Thrombosis (DVT)  Left leg, , treated and resolved      Dyslipidemia      Cataract      Morbid Obesity      Vitamin D deficiency      Insomnia      CKD (chronic kidney disease)  ~ III      Congestive heart failure  ~ HFpEF      Uterine cancer      Asthma      On home oxygen therapy      Gait difficulty  ~ u ses walker      Cataract extracted with lens implant   Right      C Section       Cholecystectomy/appendectomy @ age 25      D&C x2 's      D&C   hysteroscopy, endometrial hyperplasia,       Cervical Spinal Stenosis surgery x2 (2002, 2002)      Tonsillectomy as a child      Endometrial biopsy 09      H/O laser iridotomy  left eye,       H/O colonoscopy        S/P total abdominal hysterectomy and bilateral salpingo-oophorectomy      S/P appendectomy      S/P cholecystectomy        Allergies    wool- rash, itch (Other)  adhesives (Rash)  latex (Rash)  Bactrim (Flushing)    Intolerances      FAMILY HISTORY:  Family history of pancreatic cancer    Family history of bladder cancer    Family history of lung cancer (Grandparent)        Review of Systems:  CONSTITUTIONAL: No fever, chills, or fatigue  EYES: No eye pain, visual disturbances, or discharge  ENMT:  No difficulty hearing, tinnitus, vertigo; No sinus or throat pain  NECK: No pain or stiffness  RESPIRATORY: No cough, wheezing, chills or hemoptysis; + shortness of breath  CARDIOVASCULAR: No chest pain, palpitations, dizziness, or leg swelling  GASTROINTESTINAL: No abdominal or epigastric pain. No nausea, vomiting, or hematemesis; No diarrhea or constipation. No melena or hematochezia.  GENITOURINARY: No dysuria, frequency, hematuria, or incontinence  NEUROLOGICAL: No headaches, memory loss, loss of strength, numbness, or tremors  SKIN: No itching, burning, rashes, or lesions   MUSCULOSKELETAL: No joint pain or swelling; No muscle, back, or extremity pain +weakness since last admission  PSYCHIATRIC: No depression, anxiety, mood swings, or difficulty sleeping      Medications:                                  ICU Vital Signs Last 24 Hrs  T(C): 36.9 (16 Dec 2023 12:37), Max: 36.9 (16 Dec 2023 12:37)  T(F): 98.4 (16 Dec 2023 12:37), Max: 98.4 (16 Dec 2023 12:37)  HR: 75 (16 Dec 2023 12:37) (75 - 96)  BP: 144/65 (16 Dec 2023 12:37) (141/60 - 144/87)  BP(mean): --  ABP: --  ABP(mean): --  RR: 20 (16 Dec 2023 12:37) (18 - 34)  SpO2: 100% (16 Dec 2023 12:37) (96% - 100%)    O2 Parameters below as of 16 Dec 2023 12:37  Patient On (Oxygen Delivery Method): BiPAP/CPAP    O2 Concentration (%): 60      Vital Signs Last 24 Hrs  T(C): 36.9 (16 Dec 2023 12:37), Max: 36.9 (16 Dec 2023 12:37)  T(F): 98.4 (16 Dec 2023 12:37), Max: 98.4 (16 Dec 2023 12:37)  HR: 75 (16 Dec 2023 12:37) (75 - 96)  BP: 144/65 (16 Dec 2023 12:37) (141/60 - 144/87)  BP(mean): --  RR: 20 (16 Dec 2023 12:37) (18 - 34)  SpO2: 100% (16 Dec 2023 12:37) (96% - 100%)    Parameters below as of 16 Dec 2023 12:37  Patient On (Oxygen Delivery Method): BiPAP/CPAP    O2 Concentration (%): 60    ABG - ( 16 Dec 2023 12:10 )  pH, Arterial: 7.390 pH, Blood: x     /  pCO2: 57    /  pO2: 169   / HCO3: 34    / Base Excess: 9.5   /  SaO2: 98.4                I&O's Detail        LABS:                        12.9   12.08 )-----------( 228      ( 16 Dec 2023 10:18 )             41.1     12    138  |  93<L>  |  35.4<H>  ----------------------------<  273<H>  4.2   |  32.0<H>  |  1.44<H>    Ca    10.0      16 Dec 2023 10:18  Mg     2.0     12-    TPro  7.2  /  Alb  4.0  /  TBili  0.5  /  DBili  x   /  AST  35<H>  /  ALT  28  /  AlkPhos  138<H>  12-16          CAPILLARY BLOOD GLUCOSE        PT/INR - ( 16 Dec 2023 10:18 )   PT: 14.6 sec;   INR: 1.33 ratio         PTT - ( 16 Dec 2023 10:18 )  PTT:31.7 sec  Urinalysis Basic - ( 16 Dec 2023 12:30 )    Color: Yellow / Appearance: Clear / S.010 / pH: x  Gluc: x / Ketone: Negative mg/dL  / Bili: Negative / Urobili: 0.2 mg/dL   Blood: x / Protein: Negative mg/dL / Nitrite: Negative   Leuk Esterase: Negative / RBC: x / WBC x   Sq Epi: x / Non Sq Epi: x / Bacteria: x      CULTURES:      Physical Examination:    General: No acute distress.  Alert, oriented, interactive, nonfocal    HEENT: Pupils equal, reactive to light.  Symmetric.    PULM: diminished b/l bases no significant sputum production bipap mask with significant leak    CVS: Regular rate and rhythm, no murmurs, rubs, or gallops    ABD: obese Soft, nondistended, nontender, normoactive bowel sounds, no masses    EXT: 2+ edema, nontender, cv stasis changes with weeping ulcer    SKIN: Warm and well perfused, no rashes noted.    RADIOLOGY: cxr : cardiomegaly and pulm edema L>R       REASON FOR CONSULT: Bipap    CONSULT REQUESTED BY: ED resident    Patient is a 68y old  Female who presents with a chief complaint of SOB. Pt states she was recently at rehab after being diagnoses with DVT/PE at [facility] where she was told she had bronchitis/pna and started on po abx.  Pt presents today c/o worsening SOB.  Denies CP fever.  Started on bipap by ED staff for work of breathing.  Upon exam pt conversing in full sentences, alert, without accessory muscle use.  states her breathing feels much better.          PAST MEDICAL & SURGICAL HISTORY:  Diabetes Mellitus Type II      HTN (Hypertension)      Endometrial Hyperplasia      Cervical Stenosis of Spine      Spinal Stenosis, Lumbar      Deep Vein Thrombosis (DVT)  Left leg, , treated and resolved      Dyslipidemia      Cataract      Morbid Obesity      Vitamin D deficiency      Insomnia      CKD (chronic kidney disease)  ~ III      Congestive heart failure  ~ HFpEF      Uterine cancer      Asthma      On home oxygen therapy      Gait difficulty  ~ u ses walker      Cataract extracted with lens implant   Right      C Section       Cholecystectomy/appendectomy @ age 25      D&C x2 's      D&C   hysteroscopy, endometrial hyperplasia,       Cervical Spinal Stenosis surgery x2 (2002, 2002)      Tonsillectomy as a child      Endometrial biopsy 09      H/O laser iridotomy  left eye,       H/O colonoscopy        S/P total abdominal hysterectomy and bilateral salpingo-oophorectomy      S/P appendectomy      S/P cholecystectomy        Allergies    wool- rash, itch (Other)  adhesives (Rash)  latex (Rash)  Bactrim (Flushing)    Intolerances      FAMILY HISTORY:  Family history of pancreatic cancer    Family history of bladder cancer    Family history of lung cancer (Grandparent)        Review of Systems:  CONSTITUTIONAL: No fever, chills, or fatigue  EYES: No eye pain, visual disturbances, or discharge  ENMT:  No difficulty hearing, tinnitus, vertigo; No sinus or throat pain  NECK: No pain or stiffness  RESPIRATORY: No cough, wheezing, chills or hemoptysis; + shortness of breath  CARDIOVASCULAR: No chest pain, palpitations, dizziness, or leg swelling  GASTROINTESTINAL: No abdominal or epigastric pain. No nausea, vomiting, or hematemesis; No diarrhea or constipation. No melena or hematochezia.  GENITOURINARY: No dysuria, frequency, hematuria, or incontinence  NEUROLOGICAL: No headaches, memory loss, loss of strength, numbness, or tremors  SKIN: No itching, burning, rashes, or lesions   MUSCULOSKELETAL: No joint pain or swelling; No muscle, back, or extremity pain +weakness since last admission  PSYCHIATRIC: No depression, anxiety, mood swings, or difficulty sleeping      Medications:                                  ICU Vital Signs Last 24 Hrs  T(C): 36.9 (16 Dec 2023 12:37), Max: 36.9 (16 Dec 2023 12:37)  T(F): 98.4 (16 Dec 2023 12:37), Max: 98.4 (16 Dec 2023 12:37)  HR: 75 (16 Dec 2023 12:37) (75 - 96)  BP: 144/65 (16 Dec 2023 12:37) (141/60 - 144/87)  BP(mean): --  ABP: --  ABP(mean): --  RR: 20 (16 Dec 2023 12:37) (18 - 34)  SpO2: 100% (16 Dec 2023 12:37) (96% - 100%)    O2 Parameters below as of 16 Dec 2023 12:37  Patient On (Oxygen Delivery Method): BiPAP/CPAP    O2 Concentration (%): 60      Vital Signs Last 24 Hrs  T(C): 36.9 (16 Dec 2023 12:37), Max: 36.9 (16 Dec 2023 12:37)  T(F): 98.4 (16 Dec 2023 12:37), Max: 98.4 (16 Dec 2023 12:37)  HR: 75 (16 Dec 2023 12:37) (75 - 96)  BP: 144/65 (16 Dec 2023 12:37) (141/60 - 144/87)  BP(mean): --  RR: 20 (16 Dec 2023 12:37) (18 - 34)  SpO2: 100% (16 Dec 2023 12:37) (96% - 100%)    Parameters below as of 16 Dec 2023 12:37  Patient On (Oxygen Delivery Method): BiPAP/CPAP    O2 Concentration (%): 60    ABG - ( 16 Dec 2023 12:10 )  pH, Arterial: 7.390 pH, Blood: x     /  pCO2: 57    /  pO2: 169   / HCO3: 34    / Base Excess: 9.5   /  SaO2: 98.4                I&O's Detail        LABS:                        12.9   12.08 )-----------( 228      ( 16 Dec 2023 10:18 )             41.1     12    138  |  93<L>  |  35.4<H>  ----------------------------<  273<H>  4.2   |  32.0<H>  |  1.44<H>    Ca    10.0      16 Dec 2023 10:18  Mg     2.0     12-    TPro  7.2  /  Alb  4.0  /  TBili  0.5  /  DBili  x   /  AST  35<H>  /  ALT  28  /  AlkPhos  138<H>  12-16          CAPILLARY BLOOD GLUCOSE        PT/INR - ( 16 Dec 2023 10:18 )   PT: 14.6 sec;   INR: 1.33 ratio         PTT - ( 16 Dec 2023 10:18 )  PTT:31.7 sec  Urinalysis Basic - ( 16 Dec 2023 12:30 )    Color: Yellow / Appearance: Clear / S.010 / pH: x  Gluc: x / Ketone: Negative mg/dL  / Bili: Negative / Urobili: 0.2 mg/dL   Blood: x / Protein: Negative mg/dL / Nitrite: Negative   Leuk Esterase: Negative / RBC: x / WBC x   Sq Epi: x / Non Sq Epi: x / Bacteria: x      CULTURES:      Physical Examination:    General: No acute distress.  Alert, oriented, interactive, nonfocal    HEENT: Pupils equal, reactive to light.  Symmetric.    PULM: diminished b/l bases no significant sputum production bipap mask with significant leak    CVS: Regular rate and rhythm, no murmurs, rubs, or gallops    ABD: obese Soft, nondistended, nontender, normoactive bowel sounds, no masses    EXT: 2+ edema, nontender, cv stasis changes with weeping ulcer    SKIN: Warm and well perfused, no rashes noted.    RADIOLOGY: cxr : cardiomegaly and pulm edema L>R

## 2023-12-16 NOTE — CONSULT NOTE ADULT - NS ATTEND AMEND GEN_ALL_CORE FT
69 y/o female with hx of HFpEF, chronic respiratory failure on home O2 4L, severe pulmonary HTN, dobutamine stress echo negative for ischemia in 2019, morbid obesity with likely ENRIQUE/OHS, asthma, CKD3, IDDM2, uterine cancer s/p MEGAN, DVT LLE (2004), chronic leg edema 2/2 venous stasis, spinal stenosis with chronic back pain, prior admission in 9/2023 with SOB and elevated D dimer, was unable to undergo confirmatory testing with CTA or V/Q scan, had suboptimal LE dopplers with no evidence of DVT, and was started on AC with eliquis for presumed PE. She has had multiple recent admissions for rectal pain and LE weakness (detailed above) and is currently at [facility]. States she has been experiencing worsening midsternal chest pressure and dyspnea with chills and nonproductive cough; was started on treatment for bronchitis as outpatient. Found to be RSV+ with R sided consolidation suggestive of PNA.   -Fluid status is difficult to assess in this patient. pBNP 1650, may be underestimated in the setting of morbid obesity. Patient also with ongoing infection. She received lasix 80mg IV on admission, needs daily assessment of her volume status and diuretic adjustment   -Continue IV antibiotics, BIPAP, steroids   -AC on hold at this time, no proven history of PE and has been treated for ~3 months   -TTE 8/2023 with normal LVEF, PASP 70 mmHg   -Would benefit from RHC/LHC for further evaluation of her coronaries, filling pressures, and PH. In the past, this has been precluded by patient's weight and kidney function. She since reports significant weight loss; will attempt to complete on this admission once respiratory status improves.  -Bariatric surgery referral once clinical picture improves    D/w Dr Eng at bedside 67 y/o female with hx of HFpEF, chronic respiratory failure on home O2 4L, severe pulmonary HTN, dobutamine stress echo negative for ischemia in 2019, morbid obesity with likely ENRIQUE/OHS, asthma, CKD3, IDDM2, uterine cancer s/p MEGAN, DVT LLE (2004), chronic leg edema 2/2 venous stasis, spinal stenosis with chronic back pain, prior admission in 9/2023 with SOB and elevated D dimer, was unable to undergo confirmatory testing with CTA or V/Q scan, had suboptimal LE dopplers with no evidence of DVT, and was started on AC with eliquis for presumed PE. She has had multiple recent admissions for rectal pain and LE weakness (detailed above) and is currently at [facility]. States she has been experiencing worsening midsternal chest pressure and dyspnea with chills and nonproductive cough; was started on treatment for bronchitis as outpatient. Found to be RSV+ with R sided consolidation suggestive of PNA.   -Fluid status is difficult to assess in this patient. pBNP 1650, may be underestimated in the setting of morbid obesity. Patient also with ongoing infection. She received lasix 80mg IV on admission, needs daily assessment of her volume status and diuretic adjustment   -Continue IV antibiotics, BIPAP, steroids   -AC on hold at this time, no proven history of PE and has been treated for ~3 months   -TTE 8/2023 with normal LVEF, PASP 70 mmHg   -Would benefit from RHC/LHC for further evaluation of her coronaries, filling pressures, and PH. In the past, this has been precluded by patient's weight and kidney function. She since reports significant weight loss; will attempt to complete on this admission once respiratory status improves.  -Bariatric surgery referral once clinical picture improves    D/w Dr Eng at bedside

## 2023-12-16 NOTE — CONSULT NOTE ADULT - SUBJECTIVE AND OBJECTIVE BOX
[facility]                                              CARDIOLOGY AT [place] [address]                                             Telephone: [phone]. Fax:[phone]                                                       CARDIOLOGY CONSULTATION NOTE                                                                                             History obtained by: Patient and medical record  Community Cardiologist: Hemal Ge   obtained: Yes [  ] No [ X ]  Reason for Consultation: HFpEF      Chief complaint:    Patient is a 68y old  Female who presents with a chief complaint of chest pressure and dyspnea    HPI: 67 y/o female with hx of HFpEF (EF 55-60% 2023), on home O2 4L, pulmonary HTN, morbid obesity, asthma, CKD3, IDDM2, uterine cancer s/p MEGAN, DVT LLE (), chronic leg edema 2/2 venous stasis, spinal stenosis with chronic back pain who presents with midsternal chest pressure and shortness of breath. Patient has had multiple hospitalizations since 2023. She had hospitalization -23 for acute on chronic decompensated HFpEF with possible presumed PE and severe pulmonary hypertension (PASP of 70 mmHg). During that admission, she was profoundly volume overloaded requiring IV diuresis; elevated DDimer 407; US lower extremity edema: Markedly limited visualization. Unable to perform compression. No evidence of deep venous thrombosis in either lower extremity. +trop .09-> .08-> .06. TTE during that time: LVEF 55-60%; moderate RVE and moderately reduced RVSF; PASP 70.9mmHg. Due to patient's weight and renal function, ischemic testing cath/ NST was precluded. Patient was ultimately discharged home on oral diuresis and eliquis 5mg PO BID.     Patient had admission to [facility] -23 for complaints of rectal pain; constipation and no reported BM x 2 weeks. CT scan consistent for  constipation Rectal pain may also be d/t anal fissure vs thrombosed hemorrhoids, started on bowel regimen and nitroglycerin  ointment rectally BID for possible fissure vs thrombosed hemorrhoids. Colorectal surgery consulted & rec outpatient follow up. During her stay, she had an acute UTI treated with Ceftriaxone x 3 days. Patient was recommended to being discharged to [facility] but refused was ultimately discharged home on 23.     Patient had readmission back to [facility] on 23 for b/l LE weakness, legs giving out while walking and almost falling. She was admitted for ROSE placement and ultimately discharged on 10/4/23.     She presented to [facility] 23 for severe rectal pain with defecation evaluated by colorectal surgery in which exam was consistent with anal fissure. CT A/P w/o acute pathology; no acute surgical intervention; SITZ bath TID, Miralax/Senna, Lidocaine Cr post BM, Nitroglycerin ointment BID. Pt also with new wound anterior right ankle area with no signs of infection and no signs of cellulitis.  Seen by podiatry, Rx betadine with gauze and yasir right ankle. Patient was ultimately discharged home on .     [facility] She states she had multiple episodes of non radiating mid chest pressure and shortness of breath at rest. She states her shortness of breath got worse than usual this morning and had additional episode of chest pressure this AM. She also reports that she developed nonproductive dry cough 4 days ago, no fever but does endorse chills. She states that the rehab doctor placed her on Vantin and prednisone for bronchitis. She does endorse compliancy with her anticoagulation and diuretic. She reports being more mobile while at rehab and watching her diet. She endorses she has had over 100 lb weight loss in the last few months. In the ED, Pt was ill-appearing, tachypneic, and is unable to speak full sentences 2/2 sob, SpO2 was normal. As pt in acute respiratory distress appears to be fluid overloaded, BiPAP was started immediately after ED arrival. Patient was diuresed with 80mg IVP lasix; solumedrol 125mg IV x 1; and albuterol/ipatropium x1. EKG SR with nonspecific T wave abnormality. trop x 1 negative; proBNP 1650. RVP+ RSV. MICU was consulted and deferred for ICU admission. CT chest noncon performed revealing right lung peribronchovascular consolidation, concerning for pneumonia; cardiomegaly; enlarged pulmonary artery which can be seen with pulmonary HTN. Patient was started on IV zosyn.  Patient admitted to medicine service. Patient denies fevers, headache, focal neurologic symptoms, current chest pain, chest pressure, palpitations, dizziness, or increasing edema.        CARDIAC TESTING   ECHO: < from: TTE Echo Complete w/ Contrast w/ Doppler (23 @ 08:19) >  Summary:   1. Technically limited study.   2. Endocardial visualization was enhanced with intravenous echo contrast.   3. Left ventricular ejection fraction, by visual estimation, is 55 to   60%.   4. Grossly Normal global left ventricular systolic function.   5. Spectral Doppler shows pseudonormal pattern of left ventricular   myocardial filling (Grade II diastolic dysfunction).   6. Moderately enlarged right ventricle.   7. Moderately reduced RV systolic function on limited views.   8. The left atrium is normal in size.   9. Mild mitral annular calcification.  10. Mild thickening and calcification of the anterior and posterior   mitral valve leaflets.  11. Mild mitral valve regurgitation.  12. Moderate tricuspid regurgitation.  13. Estimated pulmonary artery systolic pressure is 70.9 mmHg assuming a   right atrial pressure of 5 mmHg, which is consistent with severe   pulmonary hypertension.  14. There is no evidence of pericardial effusion.    MD Kathryn Electronically signed on 2023 at 1:11:31 PM    < end of copied text >      STRESS: < from: Stress Echocardiogram (20 @ 10:55) >  Summary:   1. Definity Echo contrast was used. Difficult study due to patients body habitus.   2. Negative stress echo for ischemia. Despite EKG changes, there were no regional wall motion abnormalities.   3. Rare PVCs were seen.   4. Elevated PASP at rest and at peak of infusion.    < end of copied text >    CT CHEST NON CON:  < from: CT Chest No Cont (23 @ 13:27) >  IMPRESSION:  Right lung peribronchovascular consolidation, concerning for pneumonia.   Follow-up chest CT in 8-12 weeks to resolution.    Cardiomegaly. Enlarged pulmonary artery which can be seen with pulmonary   hypertension.    < end of copied text >      PAST MEDICAL HISTORY  Diabetes Mellitus Type II    HTN (Hypertension)    Endometrial Hyperplasia    Cervical Stenosis of Spine    Spinal Stenosis, Lumbar    Deep Vein Thrombosis (DVT)    Dyslipidemia    Cataract    Morbid Obesity    Vitamin D deficiency    Edema of lower extremity    Insomnia    CKD (chronic kidney disease)    Narrow angle glaucoma of left eye    Other fecal abnormalities    Congestive heart failure    Uterine cancer    Asthma    On home oxygen therapy    Gait difficulty        PAST SURGICAL HISTORY  Cataract extracted with lens implant     C Section     Cholecystectomy/appendectomy @ age 25    D&C x2     D&C     Cervical Spinal Stenosis surgery x2 (2002, 2002)    Tonsillectomy as a child    Endometrial biopsy 09    H/O laser iridotomy    H/O colonoscopy    S/P total abdominal hysterectomy and bilateral salpingo-oophorectomy    S/P appendectomy    S/P cholecystectomy        SOCIAL HISTORY:  Denies smoking/alcohol/drugs      FAMILY HISTORY:  Family history of pancreatic cancer    Family history of bladder cancer    Family history of lung cancer (Grandparent)      HOME MEDICATIONS:  ADVAIR -21 MCG INHALER: TAKE 2 PUFFS BY MOUTH TWICE A DAY (16 Dec 2023 15:21)  apixaban 5 mg oral tablet: 1 tab(s) orally every 12 hours (16 Dec 2023 15:21)  diphenhydrAMINE 25 mg oral tablet: 1 tab(s) orally 3 times a day as needed for  rash (16 Dec 2023 15:35)  furosemide 40 mg oral tablet: 1 tab(s) orally once a day (16 Dec 2023 15:21)  HumaLOG 100 units/mL injectable solution: 20 international unit(s) injectable twice a day after breakfast and dinner (16 Dec 2023 15:19)  ipratropium-albuterol 0.5 mg-2.5 mg/3 mL inhalation solution: 3 milliliter(s) by nebulizer 4 times a day (16 Dec 2023 15:35)  lactulose 20 g oral powder for reconstitution: 1 each orally once a day (16 Dec 2023 15:35)  Lantus 100 units/mL subcutaneous solution: 35 unit(s) subcutaneous once a day (at bedtime) (16 Dec 2023 15:36)  MiraLax oral powder for reconstitution: 17 gram(s) orally once a day (16 Dec 2023 15:21)  Nupercainal 1% topical ointment: Apply topically to affected area 3 times a day (16 Dec 2023 15:34)  nystatin 100,000 units/g topical powder: 1 Apply topically to affected area 3 times a day (16 Dec 2023 15:21)  oxyCODONE 5 mg oral tablet: 1 tab(s) orally every 6 hours As needed Moderate Pain (4 - 6) (16 Dec 2023 15:36)  oxyCODONE 60 mg oral tablet, extended release: 1 tab(s) orally 2 times a day (16 Dec 2023 15:21)  ProAir HFA 90 mcg/inh inhalation aerosol: 2 puff(s) inhaled (16 Dec 2023 15:36)  rosuvastatin 20 mg oral capsule: 1 cap(s) orally once a day (at bedtime) (16 Dec 2023 15:21)  senna leaf extract oral tablet: 2 tab(s) orally once a day (at bedtime) (16 Dec 2023 15:21)  Xanax 0.25 mg oral tablet: 1 tab(s) orally once a day (at bedtime) as needed for  anxiety (16 Dec 2023 15:35)      CURRENT CARDIAC MEDICATIONS:      CURRENT OTHER MEDICATIONS:  piperacillin/tazobactam IVPB.- 3.375 Gram(s) IV Intermittent once      ALLERGIES:   wool- rash, itch (Other)  adhesives (Rash)  latex (Rash)  Bactrim (Flushing)      REVIEW OF SYMPTOMS:   CONSTITUTIONAL: reports recent weight loss 100 lbs in last 6 months; reports improvement of overall strength; +intermittent chills; denies fevers  ENMT:  No vertigo; No sinus or throat pain  NECK: No pain or stiffness  CARDIOVASCULAR: reports intermittent midsternal chest pressure non radiating; +shortness of breath at rest and exertion; denies palpitations; denies left arm pain; denies denies jaw pain; denies dizziness; denies syncope  RESPIRATORY: + nonproductive cough; +dyspnea  : No dysuria, no hematuria   GI: No dark color stool, no nausea, no diarrhea, no constipation, no abdominal pain   NEURO: No headache, no slurred speech   MUSCULOSKELETAL: No joint pain or swelling; No muscle, back, or extremity pain  PSYCH: No agitation, no anxiety.    ALL OTHER REVIEW OF SYSTEMS ARE NEGATIVE.    VITAL SIGNS:  T(C): 37.2 (23 @ 15:10), Max: 37.2 (23 @ 15:10)  T(F): 99 (23 @ 15:10), Max: 99 (23 @ 15:10)  HR: 74 (23 @ 15:10) (74 - 96)  BP: 137/86 (23 @ 15:10) (133/86 - 146/65)  RR: 20 (23 @ 15:10) (18 - 34)  SpO2: 97% (23 @ 15:10) (96% - 100%)    INTAKE AND OUTPUT:      @ 07:01  -   @ 16:00  --------------------------------------------------------  IN: 0 mL / OUT: 250 mL / NET: -250 mL        PHYSICAL EXAM:  Constitutional: Comfortable . No acute distress.   HEENT: Atraumatic and normocephalic , neck is supple . no JVD. No carotid bruit.  CNS: A&Ox3. No focal deficits.   Respiratory: Rhonchorus breath sounds; diminished posteriorly  Cardiovascular: RRR normal s1 s2. No murmur. No rubs or gallop.  Gastrointestinal: Soft, non-tender. +Bowel sounds.   Extremities: extremities warm to touch; no pitting edema.   Psychiatric: Calm . no agitation.   Skin: warm and dry.      LABS:                        12.9   12.08 )-----------( 228      ( 16 Dec 2023 10:18 )             41.1         138  |  93<L>  |  35.4<H>  ----------------------------<  273<H>  4.2   |  32.0<H>  |  1.44<H>    Ca    10.0      16 Dec 2023 10:18  Mg     2.0         TPro  7.2  /  Alb  4.0  /  TBili  0.5  /  DBili  x   /  AST  35<H>  /  ALT  28  /  AlkPhos  138<H>      PT/INR - ( 16 Dec 2023 10:18 )   PT: 14.6 sec;   INR: 1.33 ratio         PTT - ( 16 Dec 2023 10:18 )  PTT:31.7 sec  Urinalysis Basic - ( 16 Dec 2023 12:30 )    Color: Yellow / Appearance: Clear / S.010 / pH: x  Gluc: x / Ketone: Negative mg/dL  / Bili: Negative / Urobili: 0.2 mg/dL   Blood: x / Protein: Negative mg/dL / Nitrite: Negative   Leuk Esterase: Negative / RBC: x / WBC x   Sq Epi: x / Non Sq Epi: x / Bacteria: x          Thyroid Stimulating Hormone, Serum: 3.02 uIU/mL (23 @ 10:18)      INTERPRETATION OF TELEMETRY:     ECG: SR with nonspecific T wave abnormality  Prior ECG: Yes [ X ] No [  ]    RADIOLOGY & ADDITIONAL STUDIES:    X-ray:  < from: Xray Chest 1 View- PORTABLE-Urgent (23 @ 10:46) >  IMPRESSION: Probable heart enlargement. Moderate CHF.  --- End of Report ---  < end of copied text >    CT scan: < from: CT Chest No Cont (23 @ 13:27) >  Right lung peribronchovascular consolidation, concerning for pneumonia.   Follow-up chest CT in 8-12 weeks to resolution.    Cardiomegaly. Enlarged pulmonary artery which can be seen with pulmonary   hypertension.    < end of copied text >                                                  [facility]                                              CARDIOLOGY AT [place] [address]                                             Telephone: [phone]. Fax:[phone]                                                       CARDIOLOGY CONSULTATION NOTE                                                                                             History obtained by: Patient and medical record  Community Cardiologist: Hemal Ge   obtained: Yes [  ] No [ X ]  Reason for Consultation: HFpEF      Chief complaint:    Patient is a 68y old  Female who presents with a chief complaint of chest pressure and dyspnea    HPI: 67 y/o female with hx of HFpEF (EF 55-60% 2023), on home O2 4L, pulmonary HTN, morbid obesity, asthma, CKD3, IDDM2, uterine cancer s/p MEGAN, DVT LLE (), chronic leg edema 2/2 venous stasis, spinal stenosis with chronic back pain who presents with midsternal chest pressure and shortness of breath. Patient has had multiple hospitalizations since 2023. She had hospitalization -23 for acute on chronic decompensated HFpEF with possible presumed PE and severe pulmonary hypertension (PASP of 70 mmHg). During that admission, she was profoundly volume overloaded requiring IV diuresis; elevated DDimer 407; US lower extremity edema: Markedly limited visualization. Unable to perform compression. No evidence of deep venous thrombosis in either lower extremity. +trop .09-> .08-> .06. TTE during that time: LVEF 55-60%; moderate RVE and moderately reduced RVSF; PASP 70.9mmHg. Due to patient's weight and renal function, ischemic testing cath/ NST was precluded. Patient was ultimately discharged home on oral diuresis and eliquis 5mg PO BID.     Patient had admission to [facility] -23 for complaints of rectal pain; constipation and no reported BM x 2 weeks. CT scan consistent for  constipation Rectal pain may also be d/t anal fissure vs thrombosed hemorrhoids, started on bowel regimen and nitroglycerin  ointment rectally BID for possible fissure vs thrombosed hemorrhoids. Colorectal surgery consulted & rec outpatient follow up. During her stay, she had an acute UTI treated with Ceftriaxone x 3 days. Patient was recommended to being discharged to [facility] but refused was ultimately discharged home on 23.     Patient had readmission back to [facility] on 23 for b/l LE weakness, legs giving out while walking and almost falling. She was admitted for ROSE placement and ultimately discharged on 10/4/23.     She presented to [facility] 23 for severe rectal pain with defecation evaluated by colorectal surgery in which exam was consistent with anal fissure. CT A/P w/o acute pathology; no acute surgical intervention; SITZ bath TID, Miralax/Senna, Lidocaine Cr post BM, Nitroglycerin ointment BID. Pt also with new wound anterior right ankle area with no signs of infection and no signs of cellulitis.  Seen by podiatry, Rx betadine with gauze and yasir right ankle. Patient was ultimately discharged home on .     Today on 23 [facility] She states she had multiple episodes of non radiating mid chest pressure and shortness of breath at rest. She states her shortness of breath got worse than usual this morning and had additional episode of chest pressure this AM. She also reports that she developed nonproductive dry cough 4 days ago, no fever but does endorse chills. She states that the rehab doctor placed her on Vantin and prednisone for bronchitis. She does endorse compliancy with her anticoagulation and diuretic. She reports being more mobile while at rehab and watching her diet. She endorses she has had over 100 lb weight loss in the last few months. In the ED, Pt was ill-appearing, tachypneic, and is unable to speak full sentences 2/2 sob, SpO2 was normal. As pt in acute respiratory distress appears to be fluid overloaded, BiPAP was started immediately after ED arrival. Patient was diuresed with 80mg IVP lasix; solumedrol 125mg IV x 1; and albuterol/ipatropium x1. EKG SR with nonspecific T wave abnormality. trop x 1 negative; proBNP 1650. RVP+ RSV. MICU was consulted and deferred for ICU admission. CT chest noncon performed revealing right lung peribronchovascular consolidation, concerning for pneumonia; cardiomegaly; enlarged pulmonary artery which can be seen with pulmonary HTN. Patient was started on IV zosyn.  Patient admitted to medicine service. Patient denies fevers, headache, focal neurologic symptoms, current chest pain, chest pressure, palpitations, dizziness, or increasing edema.        CARDIAC TESTING   ECHO: < from: TTE Echo Complete w/ Contrast w/ Doppler (23 @ 08:19) >  Summary:   1. Technically limited study.   2. Endocardial visualization was enhanced with intravenous echo contrast.   3. Left ventricular ejection fraction, by visual estimation, is 55 to   60%.   4. Grossly Normal global left ventricular systolic function.   5. Spectral Doppler shows pseudonormal pattern of left ventricular   myocardial filling (Grade II diastolic dysfunction).   6. Moderately enlarged right ventricle.   7. Moderately reduced RV systolic function on limited views.   8. The left atrium is normal in size.   9. Mild mitral annular calcification.  10. Mild thickening and calcification of the anterior and posterior   mitral valve leaflets.  11. Mild mitral valve regurgitation.  12. Moderate tricuspid regurgitation.  13. Estimated pulmonary artery systolic pressure is 70.9 mmHg assuming a   right atrial pressure of 5 mmHg, which is consistent with severe   pulmonary hypertension.  14. There is no evidence of pericardial effusion.    MD Kathryn Electronically signed on 2023 at 1:11:31 PM    < end of copied text >      STRESS: < from: Stress Echocardiogram (20 @ 10:55) >  Summary:   1. Definity Echo contrast was used. Difficult study due to patients body habitus.   2. Negative stress echo for ischemia. Despite EKG changes, there were no regional wall motion abnormalities.   3. Rare PVCs were seen.   4. Elevated PASP at rest and at peak of infusion.    < end of copied text >    CT CHEST NON CON:  < from: CT Chest No Cont (23 @ 13:27) >  IMPRESSION:  Right lung peribronchovascular consolidation, concerning for pneumonia.   Follow-up chest CT in 8-12 weeks to resolution.    Cardiomegaly. Enlarged pulmonary artery which can be seen with pulmonary   hypertension.    < end of copied text >      PAST MEDICAL HISTORY  Diabetes Mellitus Type II    HTN (Hypertension)    Endometrial Hyperplasia    Cervical Stenosis of Spine    Spinal Stenosis, Lumbar    Deep Vein Thrombosis (DVT)    Dyslipidemia    Cataract    Morbid Obesity    Vitamin D deficiency    Edema of lower extremity    Insomnia    CKD (chronic kidney disease)    Narrow angle glaucoma of left eye    Other fecal abnormalities    Congestive heart failure    Uterine cancer    Asthma    On home oxygen therapy    Gait difficulty        PAST SURGICAL HISTORY  Cataract extracted with lens implant     C Section     Cholecystectomy/appendectomy @ age 25    D&C x2     D&C     Cervical Spinal Stenosis surgery x2 (2002, 2002)    Tonsillectomy as a child    Endometrial biopsy 09    H/O laser iridotomy    H/O colonoscopy    S/P total abdominal hysterectomy and bilateral salpingo-oophorectomy    S/P appendectomy    S/P cholecystectomy        SOCIAL HISTORY:  Denies smoking/alcohol/drugs      FAMILY HISTORY:  Family history of pancreatic cancer    Family history of bladder cancer    Family history of lung cancer (Grandparent)      HOME MEDICATIONS:  ADVAIR -21 MCG INHALER: TAKE 2 PUFFS BY MOUTH TWICE A DAY (16 Dec 2023 15:21)  apixaban 5 mg oral tablet: 1 tab(s) orally every 12 hours (16 Dec 2023 15:21)  diphenhydrAMINE 25 mg oral tablet: 1 tab(s) orally 3 times a day as needed for  rash (16 Dec 2023 15:35)  furosemide 40 mg oral tablet: 1 tab(s) orally once a day (16 Dec 2023 15:21)  HumaLOG 100 units/mL injectable solution: 20 international unit(s) injectable twice a day after breakfast and dinner (16 Dec 2023 15:19)  ipratropium-albuterol 0.5 mg-2.5 mg/3 mL inhalation solution: 3 milliliter(s) by nebulizer 4 times a day (16 Dec 2023 15:35)  lactulose 20 g oral powder for reconstitution: 1 each orally once a day (16 Dec 2023 15:35)  Lantus 100 units/mL subcutaneous solution: 35 unit(s) subcutaneous once a day (at bedtime) (16 Dec 2023 15:36)  MiraLax oral powder for reconstitution: 17 gram(s) orally once a day (16 Dec 2023 15:21)  Nupercainal 1% topical ointment: Apply topically to affected area 3 times a day (16 Dec 2023 15:34)  nystatin 100,000 units/g topical powder: 1 Apply topically to affected area 3 times a day (16 Dec 2023 15:21)  oxyCODONE 5 mg oral tablet: 1 tab(s) orally every 6 hours As needed Moderate Pain (4 - 6) (16 Dec 2023 15:36)  oxyCODONE 60 mg oral tablet, extended release: 1 tab(s) orally 2 times a day (16 Dec 2023 15:21)  ProAir HFA 90 mcg/inh inhalation aerosol: 2 puff(s) inhaled (16 Dec 2023 15:36)  rosuvastatin 20 mg oral capsule: 1 cap(s) orally once a day (at bedtime) (16 Dec 2023 15:21)  senna leaf extract oral tablet: 2 tab(s) orally once a day (at bedtime) (16 Dec 2023 15:21)  Xanax 0.25 mg oral tablet: 1 tab(s) orally once a day (at bedtime) as needed for  anxiety (16 Dec 2023 15:35)      CURRENT CARDIAC MEDICATIONS:      CURRENT OTHER MEDICATIONS:  piperacillin/tazobactam IVPB.- 3.375 Gram(s) IV Intermittent once      ALLERGIES:   wool- rash, itch (Other)  adhesives (Rash)  latex (Rash)  Bactrim (Flushing)      REVIEW OF SYMPTOMS:   CONSTITUTIONAL: reports recent weight loss 100 lbs in last 6 months; reports improvement of overall strength; +intermittent chills; denies fevers  ENMT:  No vertigo; No sinus or throat pain  NECK: No pain or stiffness  CARDIOVASCULAR: reports intermittent midsternal chest pressure non radiating; +shortness of breath at rest and exertion; denies palpitations; denies left arm pain; denies denies jaw pain; denies dizziness; denies syncope  RESPIRATORY: + nonproductive cough; +dyspnea  : No dysuria, no hematuria   GI: No dark color stool, no nausea, no diarrhea, no constipation, no abdominal pain   NEURO: No headache, no slurred speech   MUSCULOSKELETAL: No joint pain or swelling; No muscle, back, or extremity pain  PSYCH: No agitation, no anxiety.    ALL OTHER REVIEW OF SYSTEMS ARE NEGATIVE.    VITAL SIGNS:  T(C): 37.2 (23 @ 15:10), Max: 37.2 (23 @ 15:10)  T(F): 99 (23 @ 15:10), Max: 99 (23 @ 15:10)  HR: 74 (23 @ 15:10) (74 - 96)  BP: 137/86 (23 @ 15:10) (133/86 - 146/65)  RR: 20 (23 @ 15:10) (18 - 34)  SpO2: 97% (23 @ 15:10) (96% - 100%)    INTAKE AND OUTPUT:      @ 07:01  -   @ 16:00  --------------------------------------------------------  IN: 0 mL / OUT: 250 mL / NET: -250 mL        PHYSICAL EXAM:  Constitutional: Comfortable . No acute distress.   HEENT: Atraumatic and normocephalic , neck is supple . no JVD. No carotid bruit.  CNS: A&Ox3. No focal deficits.   Respiratory: Rhonchorus breath sounds; diminished posteriorly  Cardiovascular: RRR normal s1 s2. No murmur. No rubs or gallop.  Gastrointestinal: Soft, non-tender. +Bowel sounds.   Extremities: extremities warm to touch; no pitting edema.   Psychiatric: Calm . no agitation.   Skin: warm and dry.      LABS:                        12.9   12.08 )-----------( 228      ( 16 Dec 2023 10:18 )             41.1         138  |  93<L>  |  35.4<H>  ----------------------------<  273<H>  4.2   |  32.0<H>  |  1.44<H>    Ca    10.0      16 Dec 2023 10:18  Mg     2.0         TPro  7.2  /  Alb  4.0  /  TBili  0.5  /  DBili  x   /  AST  35<H>  /  ALT  28  /  AlkPhos  138<H>      PT/INR - ( 16 Dec 2023 10:18 )   PT: 14.6 sec;   INR: 1.33 ratio         PTT - ( 16 Dec 2023 10:18 )  PTT:31.7 sec  Urinalysis Basic - ( 16 Dec 2023 12:30 )    Color: Yellow / Appearance: Clear / S.010 / pH: x  Gluc: x / Ketone: Negative mg/dL  / Bili: Negative / Urobili: 0.2 mg/dL   Blood: x / Protein: Negative mg/dL / Nitrite: Negative   Leuk Esterase: Negative / RBC: x / WBC x   Sq Epi: x / Non Sq Epi: x / Bacteria: x          Thyroid Stimulating Hormone, Serum: 3.02 uIU/mL (23 @ 10:18)      INTERPRETATION OF TELEMETRY:     ECG: SR with nonspecific T wave abnormality  Prior ECG: Yes [ X ] No [  ]    RADIOLOGY & ADDITIONAL STUDIES:    X-ray:  < from: Xray Chest 1 View- PORTABLE-Urgent (23 @ 10:46) >  IMPRESSION: Probable heart enlargement. Moderate CHF.  --- End of Report ---  < end of copied text >    CT scan: < from: CT Chest No Cont (23 @ 13:27) >  Right lung peribronchovascular consolidation, concerning for pneumonia.   Follow-up chest CT in 8-12 weeks to resolution.    Cardiomegaly. Enlarged pulmonary artery which can be seen with pulmonary   hypertension.    < end of copied text >

## 2023-12-16 NOTE — ED ADULT NURSE NOTE - OBJECTIVE STATEMENT
assumed pt care in ED critical care area.  Pt transported from [facility] after being diagnosed with DVT, PE and pneumonia.  Increased work of breathing and nonproductive, wet cough.  Oxygen dependent at 4lpm via NC.  Placed on BIPAP upon arrival to ED. assumed pt care in ED critical care area.  Pt transported from [facility] after being diagnosed with DVT, PE and pneumonia.  Increased work of breathing and nonproductive, wet cough.  Oxygen dependent at 4lpm via NC.  Placed on BIPAP upon arrival to ED.

## 2023-12-16 NOTE — H&P ADULT - HISTORY OF PRESENT ILLNESS
69 yo female with HTN, HLD, DM2, HFpEF, CKD III, DVT, Uterine CA, COPD on home o2 who presented with complaints of shortness of breath. Patient reports that she has been sick all week and was recently started on Vantin and steroids while at the nursing home. Patient reports that her dyspnea just worsened and she told the nursing home to send her in for an evaluation. While in the ED, patient was placed on BIPAP and was given IV lasix with some improvement. RVP then results as RSV +. Patient had a CT scan which also showed pneumonia and pulmonary HTN. Admission to medicine was requested for further management.

## 2023-12-16 NOTE — CONSULT NOTE ADULT - ASSESSMENT
67 y/o female with hx of HFpEF (EF 55-60% 8/2023), on home O2 4L, pulmonary HTN, morbid obesity, asthma, CKD3, IDDM2, uterine cancer s/p MEGAN, DVT LLE (2004), chronic leg edema 2/2 venous stasis, spinal stenosis with chronic back pain who presents with midsternal chest pressure and shortness of breath. Patient has had multiple hospitalizations since September 2023. She had hospitalization 8/30-9/13 for acute on chronic decompensated HFpEF with possible presumed PE and severe pulmonary hypertension  (PASP of 70 mmHg on TTE moderate RVE and moderately reduced RVSF). During that admission, she was profoundly volume overloaded requiring IV diuresis; elevated DDimer 407; US lower extremity edema: Markedly limited visualization but neg DVT. +trop .09-> .08-> .06. Due to patient's weight and renal function, ischemic testing cath/ NST was precluded. Patient was ultimately discharged home on oral diuresis and eliquis 5mg PO BID. She had multiple other hospitalizations at [facility] 9/18-9/29 for constipation, rectal pain and was treated for UTI. She refused ROSE at that time and was d/cd home. She was readmitted to [facility] 9/29-10/4 for ROSE placement for weakness. She was again readmitted to [facility] 11/24-12/1 for severe rectal pain, evaluated by colorectal surgery in which exam was consistent with anal fissure. CT A/P w/o acute pathology; no acute surgical intervention.     Today on 12/16/23 patient presents from [facility]. She states she had multiple episodes of non radiating mid chest pressure and shortness of breath at rest. She states her shortness of breath got worse than usual this morning and had additional episode of chest pressure this AM. She also reports that she developed nonproductive dry cough 4 days ago, no fever but does endorse chills. She states that the rehab doctor placed her on Vantin and prednisone for bronchitis. She does endorse compliancy with her anticoagulation and diuretic. She reports being more mobile and standing with her walker at rehab. She endorses she has had over 100 lb weight loss in the last few months. In the ED, Pt was ill-appearing, tachypneic, and is unable to speak full sentences 2/2 sob, SpO2 was normal. As pt in acute respiratory distress appears to be fluid overloaded, BiPAP was started immediately after ED arrival. Patient was diuresed with 80mg IVP lasix; solumedrol 125mg IV x 1; and albuterol/ipatropium x1. EKG SR with nonspecific T wave abnormality. trop x 1 negative; proBNP 1650. RVP+ RSV. MICU was consulted and patient was deferred for ICU admission. CT chest noncon performed revealing right lung peribronchovascular consolidation, concerning for pneumonia; cardiomegaly; enlarged pulmonary artery which can be seen with pulmonary HTN. Patient was started on IV zosyn. Patient admitted to medicine service. Patient denies fevers, headache, focal neurologic symptoms, current chest pain, chest pressure, palpitations, dizziness, or increasing edema.  69 y/o female with hx of HFpEF (EF 55-60% 8/2023), on home O2 4L, pulmonary HTN, morbid obesity, asthma, CKD3, IDDM2, uterine cancer s/p MEGAN, DVT LLE (2004), chronic leg edema 2/2 venous stasis, spinal stenosis with chronic back pain who presents with midsternal chest pressure and shortness of breath. Patient has had multiple hospitalizations since September 2023. She had hospitalization 8/30-9/13 for acute on chronic decompensated HFpEF with possible presumed PE and severe pulmonary hypertension  (PASP of 70 mmHg on TTE moderate RVE and moderately reduced RVSF). During that admission, she was profoundly volume overloaded requiring IV diuresis; elevated DDimer 407; US lower extremity edema: Markedly limited visualization but neg DVT. +trop .09-> .08-> .06. Due to patient's weight and renal function, ischemic testing cath/ NST was precluded. Patient was ultimately discharged home on oral diuresis and eliquis 5mg PO BID. She had multiple other hospitalizations at [facility] 9/18-9/29 for constipation, rectal pain and was treated for UTI. She refused ROSE at that time and was d/cd home. She was readmitted to [facility] 9/29-10/4 for ROSE placement for weakness. She was again readmitted to [facility] 11/24-12/1 for severe rectal pain, evaluated by colorectal surgery in which exam was consistent with anal fissure. CT A/P w/o acute pathology; no acute surgical intervention.     Today on 12/16/23 patient presents from [facility]. She states she had multiple episodes of non radiating mid chest pressure and shortness of breath at rest. She states her shortness of breath got worse than usual this morning and had additional episode of chest pressure this AM. She also reports that she developed nonproductive dry cough 4 days ago, no fever but does endorse chills. She states that the rehab doctor placed her on Vantin and prednisone for bronchitis. She does endorse compliancy with her anticoagulation and diuretic. She reports being more mobile and standing with her walker at rehab. She endorses she has had over 100 lb weight loss in the last few months. In the ED, Pt was ill-appearing, tachypneic, and is unable to speak full sentences 2/2 sob, SpO2 was normal. As pt in acute respiratory distress appears to be fluid overloaded, BiPAP was started immediately after ED arrival. Patient was diuresed with 80mg IVP lasix; solumedrol 125mg IV x 1; and albuterol/ipatropium x1. EKG SR with nonspecific T wave abnormality. trop x 1 negative; proBNP 1650. RVP+ RSV. MICU was consulted and patient was deferred for ICU admission. CT chest noncon performed revealing right lung peribronchovascular consolidation, concerning for pneumonia; cardiomegaly; enlarged pulmonary artery which can be seen with pulmonary HTN. Patient was started on IV zosyn. Patient admitted to medicine service. Patient denies fevers, headache, focal neurologic symptoms, current chest pain, chest pressure, palpitations, dizziness, or increasing edema.

## 2023-12-16 NOTE — ED PROVIDER NOTE - OBJECTIVE STATEMENT
A 67 yo F w/ PMH of HTN, HLD, DM2, HFpEF (on 4L via NC baseline), CKD III, Uterine CA and recent hospitalizations at [facility] for rectal pain, morbid obese obesity, pulmonary hypertension, DVT on Eliquis, was brought in by EMS for acute shortness of breath. Patient has cough but no fever or chills and was recently admitted to [facility] and sent to the rehab. Also reports CP. In the ED, Pt was ill-appearing, tachypneic, and is unable to speak full sentences 2/2 sob, SpO2 is normal. As pt in acute respiratory distress appears to be fluid overloaded, BiPAP was started immediately after ED arrival. Patient has never been on CPAP or BiPAP. Denies fevers, chills, headache, nausea, vomiting, diarrhea, hematuria, dysuria, dark stools, focal neurologic symptoms. A 67 yo F w/ PMH of HTN, HLD, DM2, HFpEF (on 4L via NC baseline), CKD III, Uterine CA and recent hospitalizations at [facility] for rectal pain, morbid obese obesity, pulmonary hypertension, DVT on Eliquis, was brought in by EMS for acute shortness of breath. Patient has cough but no fever or chills and was recently admitted to [facility] and sent to the rehab. Also reports CP. In the ED, Pt was ill-appearing, tachypneic, and is unable to speak full sentences 2/2 sob, SpO2 is normal. As pt in acute respiratory distress appears to be fluid overloaded, BiPAP was started immediately after ED arrival. Patient has never been on CPAP or BiPAP. Denies fevers, chills, headache, nausea, vomiting, diarrhea, hematuria, dysuria, dark stools, focal neurologic symptoms.

## 2023-12-16 NOTE — H&P ADULT - ASSESSMENT
67 yo female with HTN, HLD, DM2, HFpEF, CKD III, DVT, Uterine CA, COPD on home o2 who presented with complaints of shortness of breath. Patient reports that she has been sick all week and was recently started on Vantin and steroids while at the nursing home. Patient reports that her dyspnea just worsened and she told the nursing home to send her in for an evaluation. While in the ED, patient was placed on BIPAP and was given IV lasix with some improvement. RVP then results as RSV +. Patient had a CT scan which also showed pneumonia and pulmonary HTN. Admission to medicine was requested for further management.    #Acute on chronic respiratory failure  #Acute HFpEF exacerbation  #Superimposed bacterial Pneumonia in setting of RSV  #Pulmonary hypertension  CT scan of chest reviewed  CXR reviewed  BNP elevated  +RSV  Zosyn started  Blood cx, sputum cx, Urine legionella, urine strep, MRSA swab  IV lasix 40 Daily  TTE  Strict in and out  Cardiology consulted  Oxygen Via NC - Titrate off of BIPAP    #Chronic pain  Will need to continue home Pain meds of Oxycodone 60 mg q12h and Oxycodone 5mg q6h PRN    #Possible suspicion for PE.DVT  Patient has empirically been treated for PE/DVT since August  No scans noted to have PE/DVT  Will continue current treatment  May need to consider stopping AC given that it is approx 6 months    #HLD  Atorvastatin for rosuvastatin    #COPD  Symbicort, Spiriva, albuterol    #DM  Will place on Lantus 35 and Lispro 10 tid  ISS  a1c    DVT prophylaxis: On eliquis    Spoke with patients son and updated.  69 yo female with HTN, HLD, DM2, HFpEF, CKD III, DVT, Uterine CA, COPD on home o2 who presented with complaints of shortness of breath. Patient reports that she has been sick all week and was recently started on Vantin and steroids while at the nursing home. Patient reports that her dyspnea just worsened and she told the nursing home to send her in for an evaluation. While in the ED, patient was placed on BIPAP and was given IV lasix with some improvement. RVP then results as RSV +. Patient had a CT scan which also showed pneumonia and pulmonary HTN. Admission to medicine was requested for further management.    #Acute on chronic respiratory failure  #Acute HFpEF exacerbation  #Superimposed bacterial Pneumonia in setting of RSV  #Pulmonary hypertension  Admit to SDU  CT scan of chest reviewed  CXR reviewed  BNP elevated  +RSV  Zosyn started  Blood cx, sputum cx, Urine legionella, urine strep, MRSA swab  IV lasix 40 Daily  TTE  Strict in and out  Cardiology consulted  BIPAP- Unable to be titrated off of BIPAP  Will consult pulmonary for evaluation  Trial of Oral steroids     #Chronic pain  Will need to continue home Pain meds of Oxycodone 60 mg q12h and Oxycodone 5mg q6h PRN    #Possible suspicion for PE.DVT  Patient has empirically been treated for PE/DVT since August  No scans noted to have PE/DVT  Will hold AC for now given that patient will be going for RHC eventually    #HLD  Atorvastatin for rosuvastatin    #COPD  Steroids as above  Symbicort, Spiriva, albuterol    #DM  Will place on Lantus 35 and Lispro 10 tid  ISS  a1c    DVT prophylaxis: SCDs  Spoke with patients son and updated.  67 yo female with HTN, HLD, DM2, HFpEF, CKD III, DVT, Uterine CA, COPD on home o2 who presented with complaints of shortness of breath. Patient reports that she has been sick all week and was recently started on Vantin and steroids while at the nursing home. Patient reports that her dyspnea just worsened and she told the nursing home to send her in for an evaluation. While in the ED, patient was placed on BIPAP and was given IV lasix with some improvement. RVP then results as RSV +. Patient had a CT scan which also showed pneumonia and pulmonary HTN. Admission to medicine was requested for further management.    #Acute on chronic respiratory failure  #Acute HFpEF exacerbation  #Superimposed bacterial Pneumonia in setting of RSV  #Pulmonary hypertension  Admit to SDU  CT scan of chest reviewed  CXR reviewed  BNP elevated  +RSV  Zosyn started  Blood cx, sputum cx, Urine legionella, urine strep, MRSA swab  IV lasix 40 Daily  TTE  Strict in and out  Cardiology consulted  BIPAP- Unable to be titrated off of BIPAP  Will consult pulmonary for evaluation  Trial of Oral steroids     #Chronic pain  Will need to continue home Pain meds of Oxycodone 60 mg q12h and Oxycodone 5mg q6h PRN    #Possible suspicion for PE.DVT  Patient has empirically been treated for PE/DVT since August  No scans noted to have PE/DVT  Will hold AC for now given that patient will be going for RHC eventually    #HLD  Atorvastatin for rosuvastatin    #COPD  Steroids as above  Symbicort, Spiriva, albuterol    #DM  Will place on Lantus 35 and Lispro 10 tid  ISS  a1c    DVT prophylaxis: SCDs  Spoke with patients son and updated.  67 yo female with HTN, HLD, DM2, HFpEF, CKD III, DVT, Uterine CA, COPD on home o2 who presented with complaints of shortness of breath. Patient reports that she has been sick all week and was recently started on Vantin and steroids while at the nursing home. Patient reports that her dyspnea just worsened and she told the nursing home to send her in for an evaluation. While in the ED, patient was placed on BIPAP and was given IV lasix with some improvement. RVP then results as RSV +. Patient had a CT scan which also showed pneumonia and pulmonary HTN. Admission to medicine was requested for further management.    #Acute on chronic respiratory failure  #Acute HFpEF exacerbation  #Superimposed bacterial Pneumonia in setting of RSV  #Pulmonary hypertension  Admit to SDU  CT scan of chest reviewed  CXR reviewed  BNP elevated  +RSV  Zosyn/doxy started  Blood cx, sputum cx, Urine legionella, urine strep, MRSA swab  IV lasix 40 Daily  TTE  Strict in and out  Cardiology consulted  BIPAP- Unable to be titrated off of BIPAP  Will consult pulmonary for evaluation  Trial of Oral steroids     #Chronic pain  Will need to continue home Pain meds of Oxycodone 60 mg q12h and Oxycodone 5mg q6h PRN    #Possible suspicion for PE.DVT  Patient has empirically been treated for PE/DVT since August  No scans noted to have PE/DVT  Will hold AC for now given that patient will be going for RHC eventually    #HLD  Atorvastatin for rosuvastatin    #COPD  Steroids as above  Symbicort, Spiriva, albuterol    #DM  Will place on Lantus 35   NPO for now  ISS  a1c    DVT prophylaxis: SCDs  Spoke with patients son and updated.

## 2023-12-16 NOTE — ED PROVIDER NOTE - PROGRESS NOTE DETAILS
Patient is tolerating BiPAP well.  Patient was seen by MICU and BiPAP and Lasix and wean BiPAP as needed.  Cardiology consult called.  Patient admitted to medicine

## 2023-12-16 NOTE — CONSULT NOTE ADULT - PROBLEM SELECTOR RECOMMENDATION 9
-proBNP 1650  -trop x1 negative. Continue to trend troponins  -obtain TTE  -wean biPAP as pulmonary status permits  -s/p lasix 80mg IVP x1 in ED; solumedrol 125mg IVP x1; duoneb x1 in ED  -Continue lasix 40mg IVP daily  -CXR moderate CHF; probable heart enlargement  -CT noncon: revealing right lung peribronchovascular consolidation, concerning for pneumonia; cardiomegaly; enlarged pulmonary artery which can be seen with pulmonary HTN  - patient reports significant weight loss in the last few months, consider for R/LHC when pulmonary status more optimized and renal status stable (has been precluded from ischemic w/u in past due to her weight).  -Will hold ACE-I/ARB at this time given CKD and tentative R/LHC for Monday 12/18 (IF PULMONARY STATUS IMPROVES AND RENAL FUNCTION STABLE)  -would hold Eliquis in preparation of cath  -Strict I&O's  -Daily standing weights (if able)

## 2023-12-16 NOTE — ED ADULT TRIAGE NOTE - NURSING HOMES
[facility] [place]

## 2023-12-16 NOTE — CONSULT NOTE ADULT - NS PANP COMMENT GEN_ALL_CORE FT
68-year-old  female with history of essential hypertension type 2 diabetes mellitus spinal stenosis morbid obesity dyslipidemia vitamin D deficiency CKD stage III heart failure with preserved EF with RV failure with severe pulmonary hypertension asthma with chronic hypoxic respiratory failure with presumed PE (no CT angio of the chest with negative bilateral lower extremity Doppler on anticoagulation with apixaban) with recent admission for anal fissures  who was at rehab complaining of worsening shortness of breath started on p.o. antibiotics couple of days ago with worsening shortness of breath being admitted for    Acute on chronic hypoxic Hypercapnic respiratory failure  RSV pneumonia  Secondary bacterial pneumonia  RV failure: Acute on chronic with existing moderate to severe pulmonary hypertension  Morbid obesity with possible obesity hypoventilation syndrome/obstructive sleep apnea   hyperglycemia with underlying type 2 diabetes mellitus     patient seen and examined   s/p initial BiPAP for symptomatic management; during my interview patient was on nasal cannula at 3 to 4 L/min and full conversation without significant respiratory distress but with intermittent wet cough   RVP noted, procalcitonin, urine for Legionella and Streptococcus antigen; sputum culture if possible; empiric Zosyn with doxycycline   s/p 80 mg of IV Lasix, would recommend holding off further diuresis today and daily reassessment for further diuresis  Repeat TTE  Discussed with cardiology who is contemplating right heart cath with possible left heart cath through this hospital course which I think is a great idea and to hold off Eliquis in the meantime as not a definitive evidence of pulmonary embolism with negative Doppler venous right lower extremity in the past   aggressive management of hyperglycemia with Lantus/Premeal coverage and sliding scale   recommend every 2 hour as needed albuterol with every 4 hour around-the-clock DuoNeb with Pulmicort 0.5 mg neb every 12 hours conjunction with antitussive and chest  vest for physical therapy as possible and as tolerated   overall guarded long-term prognosis   bariatric surgery referral at the end of hospitalization if possible     plan discussed with hospitalist, ED nursing and ICU team    Thank you for your consult.   Please call us back with any worsening clinical status or with concerns & questions.   We will Sign Off for now.     Brad Eng MD   Division of Critical Care Medicine  Department of Medicine   [facility]   Cell [phone] 68-year-old  female with history of essential hypertension type 2 diabetes mellitus spinal stenosis morbid obesity dyslipidemia vitamin D deficiency CKD stage III heart failure with preserved EF with RV failure with severe pulmonary hypertension asthma with chronic hypoxic respiratory failure with presumed PE (no CT angio of the chest with negative bilateral lower extremity Doppler on anticoagulation with apixaban) with recent admission for anal fissures  who was at rehab complaining of worsening shortness of breath started on p.o. antibiotics couple of days ago with worsening shortness of breath being admitted for    Acute on chronic hypoxic Hypercapnic respiratory failure  RSV pneumonia  Secondary bacterial pneumonia  RV failure: Acute on chronic with existing moderate to severe pulmonary hypertension  Morbid obesity with possible obesity hypoventilation syndrome/obstructive sleep apnea   hyperglycemia with underlying type 2 diabetes mellitus     patient seen and examined   s/p initial BiPAP for symptomatic management; during my interview patient was on nasal cannula at 3 to 4 L/min and full conversation without significant respiratory distress but with intermittent wet cough   RVP noted, procalcitonin, urine for Legionella and Streptococcus antigen; sputum culture if possible; empiric Zosyn with doxycycline   s/p 80 mg of IV Lasix, would recommend holding off further diuresis today and daily reassessment for further diuresis  Repeat TTE  Discussed with cardiology who is contemplating right heart cath with possible left heart cath through this hospital course which I think is a great idea and to hold off Eliquis in the meantime as not a definitive evidence of pulmonary embolism with negative Doppler venous right lower extremity in the past   aggressive management of hyperglycemia with Lantus/Premeal coverage and sliding scale   recommend every 2 hour as needed albuterol with every 4 hour around-the-clock DuoNeb with Pulmicort 0.5 mg neb every 12 hours conjunction with antitussive and chest  vest for physical therapy as possible and as tolerated   overall guarded long-term prognosis   bariatric surgery referral at the end of hospitalization if possible     plan discussed with hospitalist, ED nursing and ICU team    Thank you for your consult.   Please call us back with any worsening clinical status or with concerns & questions.   We will Sign Off for now.     Brad Eng MD   Division of Critical Care Medicine  Department of Medicine   [facility]   Cell [phone]

## 2023-12-16 NOTE — ED ADULT NURSE NOTE - NSFALLHARMRISKINTERV_ED_ALL_ED
Assistance OOB with selected safe patient handling equipment if applicable/Assistance with ambulation/Communicate risk of Fall with Harm to all staff, patient, and family/Provide visual cue: red socks, yellow wristband, yellow gown, etc/Reinforce activity limits and safety measures with patient and family/Bed in lowest position, wheels locked, appropriate side rails in place/Call bell, personal items and telephone in reach/Instruct patient to call for assistance before getting out of bed/chair/stretcher/Non-slip footwear applied when patient is off stretcher/[place] to call system/Physically safe environment - no spills, clutter or unnecessary equipment/Purposeful Proactive Rounding/Room/bathroom lighting operational, light cord in reach Assistance OOB with selected safe patient handling equipment if applicable/Assistance with ambulation/Communicate risk of Fall with Harm to all staff, patient, and family/Provide visual cue: red socks, yellow wristband, yellow gown, etc/Reinforce activity limits and safety measures with patient and family/Bed in lowest position, wheels locked, appropriate side rails in place/Call bell, personal items and telephone in reach/Instruct patient to call for assistance before getting out of bed/chair/stretcher/Non-slip footwear applied when patient is off stretcher/[place] to call system/Physically safe environment - no spills, clutter or unnecessary equipment/Purposeful Proactive Rounding/Room/bathroom lighting operational, light cord in reach

## 2023-12-16 NOTE — ED ADULT TRIAGE NOTE - CHIEF COMPLAINT QUOTE
Pt A&Ox4 states "I can't breath." BIBA c/o increased work of breathing and SOB. Patient from [place], has pneumonia, was given 1 neb and 4 baby asa Pt A&Ox4 states "I can't breath." BIBA c/o increased work of breathing and SOB. Patient from [place], has pneumonia, was given 1 neb and 4 baby asa

## 2023-12-17 DIAGNOSIS — I27.20 PULMONARY HYPERTENSION, UNSPECIFIED: ICD-10-CM

## 2023-12-17 DIAGNOSIS — E66.01 MORBID (SEVERE) OBESITY DUE TO EXCESS CALORIES: ICD-10-CM

## 2023-12-17 DIAGNOSIS — J96.92 RESPIRATORY FAILURE, UNSPECIFIED WITH HYPERCAPNIA: ICD-10-CM

## 2023-12-17 DIAGNOSIS — B33.8 OTHER SPECIFIED VIRAL DISEASES: ICD-10-CM

## 2023-12-17 LAB
A1C WITH ESTIMATED AVERAGE GLUCOSE RESULT: 8.3 % — HIGH (ref 4–5.6)
A1C WITH ESTIMATED AVERAGE GLUCOSE RESULT: 8.3 % — HIGH (ref 4–5.6)
ALBUMIN SERPL ELPH-MCNC: 3.6 G/DL — SIGNIFICANT CHANGE UP (ref 3.3–5.2)
ALBUMIN SERPL ELPH-MCNC: 3.6 G/DL — SIGNIFICANT CHANGE UP (ref 3.3–5.2)
ALP SERPL-CCNC: 119 U/L — SIGNIFICANT CHANGE UP (ref 40–120)
ALP SERPL-CCNC: 119 U/L — SIGNIFICANT CHANGE UP (ref 40–120)
ALT FLD-CCNC: 23 U/L — SIGNIFICANT CHANGE UP
ALT FLD-CCNC: 23 U/L — SIGNIFICANT CHANGE UP
AST SERPL-CCNC: 19 U/L — SIGNIFICANT CHANGE UP
AST SERPL-CCNC: 19 U/L — SIGNIFICANT CHANGE UP
BASE EXCESS BLDA CALC-SCNC: 16.7 MMOL/L — HIGH (ref -2–3)
BASE EXCESS BLDA CALC-SCNC: 16.7 MMOL/L — HIGH (ref -2–3)
BASOPHILS # BLD AUTO: 0.05 K/UL — SIGNIFICANT CHANGE UP (ref 0–0.2)
BASOPHILS # BLD AUTO: 0.05 K/UL — SIGNIFICANT CHANGE UP (ref 0–0.2)
BASOPHILS NFR BLD AUTO: 0.4 % — SIGNIFICANT CHANGE UP (ref 0–2)
BASOPHILS NFR BLD AUTO: 0.4 % — SIGNIFICANT CHANGE UP (ref 0–2)
BILIRUB SERPL-MCNC: 0.6 MG/DL — SIGNIFICANT CHANGE UP (ref 0.4–2)
BILIRUB SERPL-MCNC: 0.6 MG/DL — SIGNIFICANT CHANGE UP (ref 0.4–2)
BUN SERPL-MCNC: 40 MG/DL — HIGH (ref 8–20)
BUN SERPL-MCNC: 40 MG/DL — HIGH (ref 8–20)
CALCIUM SERPL-MCNC: 9.5 MG/DL — SIGNIFICANT CHANGE UP (ref 8.4–10.5)
CALCIUM SERPL-MCNC: 9.5 MG/DL — SIGNIFICANT CHANGE UP (ref 8.4–10.5)
CHLORIDE SERPL-SCNC: 89 MMOL/L — LOW (ref 96–108)
CHLORIDE SERPL-SCNC: 89 MMOL/L — LOW (ref 96–108)
CHOLEST SERPL-MCNC: 140 MG/DL — SIGNIFICANT CHANGE UP
CHOLEST SERPL-MCNC: 140 MG/DL — SIGNIFICANT CHANGE UP
CO2 SERPL-SCNC: 33 MMOL/L — HIGH (ref 22–29)
CO2 SERPL-SCNC: 33 MMOL/L — HIGH (ref 22–29)
CREAT SERPL-MCNC: 1.56 MG/DL — HIGH (ref 0.5–1.3)
CREAT SERPL-MCNC: 1.56 MG/DL — HIGH (ref 0.5–1.3)
CULTURE RESULTS: NO GROWTH — SIGNIFICANT CHANGE UP
CULTURE RESULTS: NO GROWTH — SIGNIFICANT CHANGE UP
EGFR: 36 ML/MIN/1.73M2 — LOW
EGFR: 36 ML/MIN/1.73M2 — LOW
ELLIPTOCYTES BLD QL SMEAR: SLIGHT — SIGNIFICANT CHANGE UP
ELLIPTOCYTES BLD QL SMEAR: SLIGHT — SIGNIFICANT CHANGE UP
EOSINOPHIL # BLD AUTO: 0 K/UL — SIGNIFICANT CHANGE UP (ref 0–0.5)
EOSINOPHIL # BLD AUTO: 0 K/UL — SIGNIFICANT CHANGE UP (ref 0–0.5)
EOSINOPHIL NFR BLD AUTO: 0 % — SIGNIFICANT CHANGE UP (ref 0–6)
EOSINOPHIL NFR BLD AUTO: 0 % — SIGNIFICANT CHANGE UP (ref 0–6)
ESTIMATED AVERAGE GLUCOSE: 192 MG/DL — HIGH (ref 68–114)
ESTIMATED AVERAGE GLUCOSE: 192 MG/DL — HIGH (ref 68–114)
GAS PNL BLDA: SIGNIFICANT CHANGE UP
GAS PNL BLDA: SIGNIFICANT CHANGE UP
GLUCOSE BLDC GLUCOMTR-MCNC: 365 MG/DL — HIGH (ref 70–99)
GLUCOSE BLDC GLUCOMTR-MCNC: 365 MG/DL — HIGH (ref 70–99)
GLUCOSE BLDC GLUCOMTR-MCNC: 366 MG/DL — HIGH (ref 70–99)
GLUCOSE BLDC GLUCOMTR-MCNC: 366 MG/DL — HIGH (ref 70–99)
GLUCOSE BLDC GLUCOMTR-MCNC: 420 MG/DL — HIGH (ref 70–99)
GLUCOSE BLDC GLUCOMTR-MCNC: 430 MG/DL — HIGH (ref 70–99)
GLUCOSE BLDC GLUCOMTR-MCNC: 430 MG/DL — HIGH (ref 70–99)
GLUCOSE BLDC GLUCOMTR-MCNC: 437 MG/DL — HIGH (ref 70–99)
GLUCOSE BLDC GLUCOMTR-MCNC: 437 MG/DL — HIGH (ref 70–99)
GLUCOSE BLDC GLUCOMTR-MCNC: 439 MG/DL — HIGH (ref 70–99)
GLUCOSE BLDC GLUCOMTR-MCNC: 439 MG/DL — HIGH (ref 70–99)
GLUCOSE BLDC GLUCOMTR-MCNC: 456 MG/DL — CRITICAL HIGH (ref 70–99)
GLUCOSE BLDC GLUCOMTR-MCNC: 456 MG/DL — CRITICAL HIGH (ref 70–99)
GLUCOSE BLDC GLUCOMTR-MCNC: 458 MG/DL — CRITICAL HIGH (ref 70–99)
GLUCOSE BLDC GLUCOMTR-MCNC: 458 MG/DL — CRITICAL HIGH (ref 70–99)
GLUCOSE BLDC GLUCOMTR-MCNC: 465 MG/DL — CRITICAL HIGH (ref 70–99)
GLUCOSE BLDC GLUCOMTR-MCNC: 465 MG/DL — CRITICAL HIGH (ref 70–99)
GLUCOSE SERPL-MCNC: 420 MG/DL — HIGH (ref 70–99)
GLUCOSE SERPL-MCNC: 420 MG/DL — HIGH (ref 70–99)
HCO3 BLDA-SCNC: 42 MMOL/L — HIGH (ref 21–28)
HCO3 BLDA-SCNC: 42 MMOL/L — HIGH (ref 21–28)
HCT VFR BLD CALC: 38.4 % — SIGNIFICANT CHANGE UP (ref 34.5–45)
HCT VFR BLD CALC: 38.4 % — SIGNIFICANT CHANGE UP (ref 34.5–45)
HDLC SERPL-MCNC: 70 MG/DL — SIGNIFICANT CHANGE UP
HDLC SERPL-MCNC: 70 MG/DL — SIGNIFICANT CHANGE UP
HGB BLD-MCNC: 12.7 G/DL — SIGNIFICANT CHANGE UP (ref 11.5–15.5)
HGB BLD-MCNC: 12.7 G/DL — SIGNIFICANT CHANGE UP (ref 11.5–15.5)
HOROWITZ INDEX BLDA+IHG-RTO: SIGNIFICANT CHANGE UP
HOROWITZ INDEX BLDA+IHG-RTO: SIGNIFICANT CHANGE UP
IMM GRANULOCYTES NFR BLD AUTO: 0.8 % — SIGNIFICANT CHANGE UP (ref 0–0.9)
IMM GRANULOCYTES NFR BLD AUTO: 0.8 % — SIGNIFICANT CHANGE UP (ref 0–0.9)
LEGIONELLA AG UR QL: NEGATIVE — SIGNIFICANT CHANGE UP
LEGIONELLA AG UR QL: NEGATIVE — SIGNIFICANT CHANGE UP
LIPID PNL WITH DIRECT LDL SERPL: 43 MG/DL — SIGNIFICANT CHANGE UP
LIPID PNL WITH DIRECT LDL SERPL: 43 MG/DL — SIGNIFICANT CHANGE UP
LYMPHOCYTES # BLD AUTO: 0.34 K/UL — LOW (ref 1–3.3)
LYMPHOCYTES # BLD AUTO: 0.34 K/UL — LOW (ref 1–3.3)
LYMPHOCYTES # BLD AUTO: 2.8 % — LOW (ref 13–44)
LYMPHOCYTES # BLD AUTO: 2.8 % — LOW (ref 13–44)
MACROCYTES BLD QL: SLIGHT — SIGNIFICANT CHANGE UP
MACROCYTES BLD QL: SLIGHT — SIGNIFICANT CHANGE UP
MAGNESIUM SERPL-MCNC: 2.1 MG/DL — SIGNIFICANT CHANGE UP (ref 1.6–2.6)
MAGNESIUM SERPL-MCNC: 2.1 MG/DL — SIGNIFICANT CHANGE UP (ref 1.6–2.6)
MANUAL SMEAR VERIFICATION: SIGNIFICANT CHANGE UP
MANUAL SMEAR VERIFICATION: SIGNIFICANT CHANGE UP
MCHC RBC-ENTMCNC: 30.3 PG — SIGNIFICANT CHANGE UP (ref 27–34)
MCHC RBC-ENTMCNC: 30.3 PG — SIGNIFICANT CHANGE UP (ref 27–34)
MCHC RBC-ENTMCNC: 33.1 GM/DL — SIGNIFICANT CHANGE UP (ref 32–36)
MCHC RBC-ENTMCNC: 33.1 GM/DL — SIGNIFICANT CHANGE UP (ref 32–36)
MCV RBC AUTO: 91.6 FL — SIGNIFICANT CHANGE UP (ref 80–100)
MCV RBC AUTO: 91.6 FL — SIGNIFICANT CHANGE UP (ref 80–100)
MONOCYTES # BLD AUTO: 0.69 K/UL — SIGNIFICANT CHANGE UP (ref 0–0.9)
MONOCYTES # BLD AUTO: 0.69 K/UL — SIGNIFICANT CHANGE UP (ref 0–0.9)
MONOCYTES NFR BLD AUTO: 5.7 % — SIGNIFICANT CHANGE UP (ref 2–14)
MONOCYTES NFR BLD AUTO: 5.7 % — SIGNIFICANT CHANGE UP (ref 2–14)
MRSA PCR RESULT.: SIGNIFICANT CHANGE UP
MRSA PCR RESULT.: SIGNIFICANT CHANGE UP
NEUTROPHILS # BLD AUTO: 10.84 K/UL — HIGH (ref 1.8–7.4)
NEUTROPHILS # BLD AUTO: 10.84 K/UL — HIGH (ref 1.8–7.4)
NEUTROPHILS NFR BLD AUTO: 90.3 % — HIGH (ref 43–77)
NEUTROPHILS NFR BLD AUTO: 90.3 % — HIGH (ref 43–77)
NON HDL CHOLESTEROL: 70 MG/DL — SIGNIFICANT CHANGE UP
NON HDL CHOLESTEROL: 70 MG/DL — SIGNIFICANT CHANGE UP
PCO2 BLDA: 67 MMHG — HIGH (ref 32–45)
PCO2 BLDA: 67 MMHG — HIGH (ref 32–45)
PH BLDA: 7.4 — SIGNIFICANT CHANGE UP (ref 7.35–7.45)
PH BLDA: 7.4 — SIGNIFICANT CHANGE UP (ref 7.35–7.45)
PLAT MORPH BLD: NORMAL — SIGNIFICANT CHANGE UP
PLAT MORPH BLD: NORMAL — SIGNIFICANT CHANGE UP
PLATELET # BLD AUTO: 212 K/UL — SIGNIFICANT CHANGE UP (ref 150–400)
PLATELET # BLD AUTO: 212 K/UL — SIGNIFICANT CHANGE UP (ref 150–400)
PO2 BLDA: 117 MMHG — HIGH (ref 83–108)
PO2 BLDA: 117 MMHG — HIGH (ref 83–108)
POLYCHROMASIA BLD QL SMEAR: SLIGHT — SIGNIFICANT CHANGE UP
POLYCHROMASIA BLD QL SMEAR: SLIGHT — SIGNIFICANT CHANGE UP
POTASSIUM SERPL-MCNC: 4.3 MMOL/L — SIGNIFICANT CHANGE UP (ref 3.5–5.3)
POTASSIUM SERPL-MCNC: 4.3 MMOL/L — SIGNIFICANT CHANGE UP (ref 3.5–5.3)
POTASSIUM SERPL-SCNC: 4.3 MMOL/L — SIGNIFICANT CHANGE UP (ref 3.5–5.3)
POTASSIUM SERPL-SCNC: 4.3 MMOL/L — SIGNIFICANT CHANGE UP (ref 3.5–5.3)
PROT SERPL-MCNC: 6.7 G/DL — SIGNIFICANT CHANGE UP (ref 6.6–8.7)
PROT SERPL-MCNC: 6.7 G/DL — SIGNIFICANT CHANGE UP (ref 6.6–8.7)
RBC # BLD: 4.19 M/UL — SIGNIFICANT CHANGE UP (ref 3.8–5.2)
RBC # BLD: 4.19 M/UL — SIGNIFICANT CHANGE UP (ref 3.8–5.2)
RBC # FLD: 14.1 % — SIGNIFICANT CHANGE UP (ref 10.3–14.5)
RBC # FLD: 14.1 % — SIGNIFICANT CHANGE UP (ref 10.3–14.5)
RBC BLD AUTO: ABNORMAL
RBC BLD AUTO: ABNORMAL
S AUREUS DNA NOSE QL NAA+PROBE: SIGNIFICANT CHANGE UP
S AUREUS DNA NOSE QL NAA+PROBE: SIGNIFICANT CHANGE UP
S PNEUM AG UR QL: NEGATIVE — SIGNIFICANT CHANGE UP
S PNEUM AG UR QL: NEGATIVE — SIGNIFICANT CHANGE UP
SAO2 % BLDA: 98.3 % — HIGH (ref 94–98)
SAO2 % BLDA: 98.3 % — HIGH (ref 94–98)
SODIUM SERPL-SCNC: 135 MMOL/L — SIGNIFICANT CHANGE UP (ref 135–145)
SODIUM SERPL-SCNC: 135 MMOL/L — SIGNIFICANT CHANGE UP (ref 135–145)
SPECIMEN SOURCE: SIGNIFICANT CHANGE UP
SPECIMEN SOURCE: SIGNIFICANT CHANGE UP
TRIGL SERPL-MCNC: 133 MG/DL — SIGNIFICANT CHANGE UP
TRIGL SERPL-MCNC: 133 MG/DL — SIGNIFICANT CHANGE UP
TROPONIN T, HIGH SENSITIVITY RESULT: 36 NG/L — SIGNIFICANT CHANGE UP (ref 0–51)
TROPONIN T, HIGH SENSITIVITY RESULT: 36 NG/L — SIGNIFICANT CHANGE UP (ref 0–51)
WBC # BLD: 12.02 K/UL — HIGH (ref 3.8–10.5)
WBC # BLD: 12.02 K/UL — HIGH (ref 3.8–10.5)
WBC # FLD AUTO: 12.02 K/UL — HIGH (ref 3.8–10.5)
WBC # FLD AUTO: 12.02 K/UL — HIGH (ref 3.8–10.5)

## 2023-12-17 PROCEDURE — 99233 SBSQ HOSP IP/OBS HIGH 50: CPT

## 2023-12-17 PROCEDURE — 99223 1ST HOSP IP/OBS HIGH 75: CPT

## 2023-12-17 PROCEDURE — 99291 CRITICAL CARE FIRST HOUR: CPT

## 2023-12-17 PROCEDURE — 93970 EXTREMITY STUDY: CPT | Mod: 26

## 2023-12-17 PROCEDURE — 93010 ELECTROCARDIOGRAM REPORT: CPT

## 2023-12-17 RX ORDER — HYDROMORPHONE HYDROCHLORIDE 2 MG/ML
1 INJECTION INTRAMUSCULAR; INTRAVENOUS; SUBCUTANEOUS EVERY 4 HOURS
Refills: 0 | Status: DISCONTINUED | OUTPATIENT
Start: 2023-12-17 | End: 2023-12-22

## 2023-12-17 RX ORDER — INSULIN GLARGINE 100 [IU]/ML
45 INJECTION, SOLUTION SUBCUTANEOUS AT BEDTIME
Refills: 0 | Status: DISCONTINUED | OUTPATIENT
Start: 2023-12-17 | End: 2023-12-18

## 2023-12-17 RX ORDER — INSULIN LISPRO 100/ML
5 VIAL (ML) SUBCUTANEOUS
Refills: 0 | Status: DISCONTINUED | OUTPATIENT
Start: 2023-12-17 | End: 2023-12-17

## 2023-12-17 RX ORDER — INSULIN LISPRO 100/ML
10 VIAL (ML) SUBCUTANEOUS
Refills: 0 | Status: DISCONTINUED | OUTPATIENT
Start: 2023-12-17 | End: 2023-12-18

## 2023-12-17 RX ORDER — AMLODIPINE BESYLATE 2.5 MG/1
5 TABLET ORAL DAILY
Refills: 0 | Status: DISCONTINUED | OUTPATIENT
Start: 2023-12-17 | End: 2023-12-17

## 2023-12-17 RX ORDER — INSULIN LISPRO 100/ML
6 VIAL (ML) SUBCUTANEOUS ONCE
Refills: 0 | Status: COMPLETED | OUTPATIENT
Start: 2023-12-17 | End: 2023-12-17

## 2023-12-17 RX ORDER — INSULIN LISPRO 100/ML
4 VIAL (ML) SUBCUTANEOUS ONCE
Refills: 0 | Status: COMPLETED | OUTPATIENT
Start: 2023-12-17 | End: 2023-12-17

## 2023-12-17 RX ORDER — HYDROMORPHONE HYDROCHLORIDE 2 MG/ML
1 INJECTION INTRAMUSCULAR; INTRAVENOUS; SUBCUTANEOUS ONCE
Refills: 0 | Status: DISCONTINUED | OUTPATIENT
Start: 2023-12-17 | End: 2023-12-17

## 2023-12-17 RX ORDER — HYDRALAZINE HCL 50 MG
10 TABLET ORAL EVERY 4 HOURS
Refills: 0 | Status: DISCONTINUED | OUTPATIENT
Start: 2023-12-17 | End: 2024-01-08

## 2023-12-17 RX ORDER — AMLODIPINE BESYLATE 2.5 MG/1
10 TABLET ORAL EVERY 24 HOURS
Refills: 0 | Status: DISCONTINUED | OUTPATIENT
Start: 2023-12-17 | End: 2023-12-19

## 2023-12-17 RX ADMIN — Medication 10 UNIT(S): at 15:33

## 2023-12-17 RX ADMIN — Medication 6 UNIT(S): at 07:13

## 2023-12-17 RX ADMIN — Medication 40 MILLIGRAM(S): at 22:55

## 2023-12-17 RX ADMIN — HYDROMORPHONE HYDROCHLORIDE 1 MILLIGRAM(S): 2 INJECTION INTRAMUSCULAR; INTRAVENOUS; SUBCUTANEOUS at 09:27

## 2023-12-17 RX ADMIN — PIPERACILLIN AND TAZOBACTAM 25 GRAM(S): 4; .5 INJECTION, POWDER, LYOPHILIZED, FOR SOLUTION INTRAVENOUS at 06:42

## 2023-12-17 RX ADMIN — Medication 10 MILLIGRAM(S): at 19:30

## 2023-12-17 RX ADMIN — Medication 100 MILLIGRAM(S): at 10:37

## 2023-12-17 RX ADMIN — Medication 40 MILLIGRAM(S): at 06:30

## 2023-12-17 RX ADMIN — OXYCODONE HYDROCHLORIDE 60 MILLIGRAM(S): 5 TABLET ORAL at 18:17

## 2023-12-17 RX ADMIN — OXYCODONE HYDROCHLORIDE 60 MILLIGRAM(S): 5 TABLET ORAL at 06:30

## 2023-12-17 RX ADMIN — AMLODIPINE BESYLATE 5 MILLIGRAM(S): 2.5 TABLET ORAL at 15:02

## 2023-12-17 RX ADMIN — INSULIN GLARGINE 45 UNIT(S): 100 INJECTION, SOLUTION SUBCUTANEOUS at 22:56

## 2023-12-17 RX ADMIN — ATORVASTATIN CALCIUM 80 MILLIGRAM(S): 80 TABLET, FILM COATED ORAL at 22:51

## 2023-12-17 RX ADMIN — LACTULOSE 20 GRAM(S): 10 SOLUTION ORAL at 15:02

## 2023-12-17 RX ADMIN — POLYETHYLENE GLYCOL 3350 17 GRAM(S): 17 POWDER, FOR SOLUTION ORAL at 15:02

## 2023-12-17 RX ADMIN — OXYCODONE HYDROCHLORIDE 60 MILLIGRAM(S): 5 TABLET ORAL at 07:58

## 2023-12-17 RX ADMIN — PIPERACILLIN AND TAZOBACTAM 25 GRAM(S): 4; .5 INJECTION, POWDER, LYOPHILIZED, FOR SOLUTION INTRAVENOUS at 22:55

## 2023-12-17 RX ADMIN — PIPERACILLIN AND TAZOBACTAM 25 GRAM(S): 4; .5 INJECTION, POWDER, LYOPHILIZED, FOR SOLUTION INTRAVENOUS at 15:03

## 2023-12-17 RX ADMIN — Medication 100 MILLIGRAM(S): at 22:55

## 2023-12-17 RX ADMIN — Medication 10 MILLIGRAM(S): at 15:32

## 2023-12-17 RX ADMIN — Medication 40 MILLIGRAM(S): at 15:03

## 2023-12-17 RX ADMIN — Medication 4 UNIT(S): at 03:01

## 2023-12-17 RX ADMIN — Medication 12: at 15:12

## 2023-12-17 RX ADMIN — OXYCODONE HYDROCHLORIDE 5 MILLIGRAM(S): 5 TABLET ORAL at 13:41

## 2023-12-17 RX ADMIN — Medication 12: at 13:40

## 2023-12-17 NOTE — CONSULT NOTE ADULT - PROBLEM SELECTOR PROBLEM 1
Acute on chronic heart failure with preserved ejection fraction (HFpEF)
Respiratory failure with hypercapnia

## 2023-12-17 NOTE — PROGRESS NOTE ADULT - SUBJECTIVE AND OBJECTIVE BOX
Patient is a 68y old  Female who presents with a chief complaint of SOB (17 Dec 2023 10:05)      Patient seen and examined at bedside.  tachypneic but maintaining o2 sats > 90 on 4 L nc , used bipap for short period overnight and asked rn to take it off to eat and drink water. now wants it back on. c/o mild chest pain  earlier , now comfortable     ALLERGIES:  wool- rash, itch (Other)  adhesives (Rash)  latex (Rash)  Bactrim ([place])    MEDICATIONS  (STANDING):  atorvastatin 80 milliGRAM(s) Oral at bedtime  budesonide  80 MICROgram(s)/formoterol 4.5 MICROgram(s) Inhaler 2 Puff(s) Inhalation two times a day  dextrose 5%. 1000 milliLiter(s) (50 mL/Hr) IV Continuous <Continuous>  dextrose 5%. 1000 milliLiter(s) (100 mL/Hr) IV Continuous <Continuous>  dextrose 50% Injectable 25 Gram(s) IV Push once  dextrose 50% Injectable 12.5 Gram(s) IV Push once  dextrose 50% Injectable 25 Gram(s) IV Push once  doxycycline IVPB 100 milliGRAM(s) IV Intermittent every 12 hours  doxycycline IVPB      glucagon  Injectable 1 milliGRAM(s) IntraMuscular once  insulin glargine Injectable (LANTUS) 45 Unit(s) SubCutaneous at bedtime  insulin lispro (ADMELOG) corrective regimen sliding scale   SubCutaneous every 6 hours  insulin lispro Injectable (ADMELOG) 5 Unit(s) SubCutaneous three times a day before meals  lactulose Syrup 20 Gram(s) Oral daily  oxyCODONE  ER Tablet 60 milliGRAM(s) Oral every 12 hours  piperacillin/tazobactam IVPB.- 3.375 Gram(s) IV Intermittent once  piperacillin/tazobactam IVPB.. 3.375 Gram(s) IV Intermittent every 8 hours  polyethylene glycol 3350 17 Gram(s) Oral daily  predniSONE   Tablet 40 milliGRAM(s) Oral daily  senna 2 Tablet(s) Oral at bedtime  tiotropium 2.5 MICROgram(s) Inhaler 2 Puff(s) Inhalation daily    MEDICATIONS  (PRN):  acetaminophen     Tablet .. 650 milliGRAM(s) Oral every 6 hours PRN Temp greater or equal to 38C (100.4F), Mild Pain (1 - 3)  albuterol    90 MICROgram(s) HFA Inhaler 2 Puff(s) Inhalation every 2 hours PRN Shortness of Breath and/or Wheezing  ALPRAZolam 0.25 milliGRAM(s) Oral at bedtime PRN for anxiety/Sleep  dextrose Oral Gel 15 Gram(s) Oral once PRN Blood Glucose LESS THAN 70 milliGRAM(s)/deciliter  diphenhydrAMINE 25 milliGRAM(s) Oral every 6 hours PRN Rash and/or Itching  melatonin 3 milliGRAM(s) Oral at bedtime PRN Insomnia  ondansetron Injectable 4 milliGRAM(s) IV Push every 8 hours PRN Nausea and/or Vomiting  oxyCODONE    IR 5 milliGRAM(s) Oral every 6 hours PRN Moderate Pain (4 - 6)    Vital Signs Last 24 Hrs  T(F): 98.6 (17 Dec 2023 10:30), Max: 99.1 (16 Dec 2023 16:49)  HR: 80 (17 Dec 2023 10:30) (74 - 98)  BP: 160/68 (17 Dec 2023 10:30) (133/86 - 179/76)  RR: 23 (17 Dec 2023 10:30) (18 - 30)  SpO2: 96% (17 Dec 2023 12:17) (92% - 100%)  I&O's Summary    16 Dec 2023 07:01  -  17 Dec 2023 07:00  --------------------------------------------------------  IN: 0 mL / OUT: 3450 mL / NET: -3450 mL    17 Dec 2023 07:01  -  17 Dec 2023 13:41  --------------------------------------------------------  IN: 0 mL / OUT: 1000 mL / NET: -1000 mL    GENERAL: NAD, morbidly obese female  Eyes: EOMI, PERRLA  ENMT: Conjunctiva and sclera clear; supple neck, No JVD  Cardiovascular: S1,S2, RRR, No murmur  Respiratory: Coarse breath sounds b/l  GI: Bowel sounds present; Soft, Nontender, Nondistended. No hepatomegaly  Skin:  no breakdowns, ulcers or discharge, LE Chronic venous stasis   Neurology: Alert & Oriented X3, non-focal and spontaneous movements of all extremities, CN 2 to 12 grossly intact   Psych: Appropriate mood and affect, calm, pleasant, Responds appropriately to questions      LABS:                        12.7   12.02 )-----------( 212      ( 17 Dec 2023 08:20 )             38.4     12-17    135  |  89  |  40.0  ----------------------------<  420  4.3   |  33.0  |  1.56    Ca    9.5      17 Dec 2023 08:20  Mg     2.1     12-17    TPro  6.7  /  Alb  3.6  /  TBili  0.6  /  DBili  x   /  AST  19  /  ALT  23  /  AlkPhos  119  12-17      Lipase: 12 U/L (12-16-23 @ 10:18)      PT/INR - ( 16 Dec 2023 10:18 )   PT: 14.6 sec;   INR: 1.33 ratio         PTT - ( 16 Dec 2023 10:18 )  PTT:31.7 sec          12-17 Chol 140 mg/dL LDL -- HDL 70 mg/dL Trig 133 mg/dL  TSH 1.51   TSH with FT4 reflex --  Total T3 --    10:29 - VBG - pH: 7.500 | pCO2: 43    | pO2: 160   | Lactate: 2.40     ABG - ( 17 Dec 2023 09:29 )  pH, Arterial: 7.400 pH, Blood: x     /  pCO2: 67    /  pO2: 117   / HCO3: 42    / Base Excess: 16.7  /  SaO2: 98.3        POCT Blood Glucose.: 430 mg/dL (17 Dec 2023 13:38)  POCT Blood Glucose.: 456 mg/dL (17 Dec 2023 13:37)  POCT Blood Glucose.: 420 mg/dL (17 Dec 2023 06:35)  POCT Blood Glucose.: 458 mg/dL (17 Dec 2023 02:26)  POCT Blood Glucose.: 377 mg/dL (16 Dec 2023 16:59)      Urinalysis Basic - ( 17 Dec 2023 08:20 )    Color: x / Appearance: x / SG: x / pH: x  Gluc: 420 mg/dL / Ketone: x  / Bili: x / Urobili: x   Blood: x / Protein: x / Nitrite: x   Leuk Esterase: x / RBC: x / WBC x   Sq Epi: x / Non Sq Epi: x / Bacteria: x          RADIOLOGY & ADDITIONAL TESTS:    Care Discussed with Consultants/Other Providers:

## 2023-12-17 NOTE — PROGRESS NOTE ADULT - SUBJECTIVE AND OBJECTIVE BOX
[facility]                                                         CARDIOLOGY AT [place] [address]                                                         Telephone: [phone]. Fax:[phone]                                                                             PROGRESS NOTE    Reason for follow up: CHF  Update: Diuresing well    IN: 0 mL / OUT: 3450 mL / NET: -3450 mL  Feeling better  still on bipap    Review of symptoms:   Cardiac:  No chest pain. No dyspnea. No palpitations.  Respiratory: no cough. No dyspnea  Gastrointestinal: No diarrhea. No abdominal pain. No bleeding.   Neuro: No focal neuro complaints.      Vitals:  T(C): 37.2 (12-16-23 @ 18:37), Max: 37.3 (12-16-23 @ 16:49)  HR: 81 (12-17-23 @ 09:36) (74 - 98)  BP: 137/83 (12-17-23 @ 08:10) (133/86 - 179/76)  RR: 24 (12-17-23 @ 08:10) (18 - 34)  SpO2: 95% (12-17-23 @ 09:36) (95% - 100%)  Wt(kg): --  I&O's Summary    16 Dec 2023 07:01  -  17 Dec 2023 07:00  --------------------------------------------------------  IN: 0 mL / OUT: 3450 mL / NET: -3450 mL  17 Dec 2023 07:01  -  17 Dec 2023 10:06  --------------------------------------------------------  IN: 0 mL / OUT: 1000 mL / NET: -1000 mL  Weight (kg): 177.4 (12-16 @ 10:02)      PHYSICAL EXAM:  Appearance: Lying on side on bipap  HEENT:  Atraumatic. Normocephalic.  Normal oral mucosa  Neurologic: A & O x 3, no gross focal deficits.  Cardiovascular: RRR S1 S2, decreased  Respiratory: Lungs decreased lung sounds  Gastrointestinal:  Soft, Non-tender, + BS  Lower Extremities: + edema  Psychiatry: Patient is calm. No agitation.   Skin: warm and dry.      CURRENT MEDICATIONS:  MEDICATIONS  (STANDING):  atorvastatin 80 milliGRAM(s) Oral at bedtime  budesonide  80 MICROgram(s)/formoterol 4.5 MICROgram(s) Inhaler 2 Puff(s) Inhalation two times a day  doxycycline IVPB      doxycycline IVPB 100 milliGRAM(s) IV Intermittent every 12 hours  furosemide   Injectable 40 milliGRAM(s) IV Push daily  glucagon  Injectable 1 milliGRAM(s) IntraMuscular once  insulin glargine Injectable (LANTUS) 35 Unit(s) SubCutaneous at bedtime  insulin lispro (ADMELOG) corrective regimen sliding scale   SubCutaneous every 6 hours  lactulose Syrup 20 Gram(s) Oral daily  oxyCODONE  ER Tablet 60 milliGRAM(s) Oral every 12 hours  piperacillin/tazobactam IVPB.- 3.375 Gram(s) IV Intermittent once  piperacillin/tazobactam IVPB.. 3.375 Gram(s) IV Intermittent every 8 hours  polyethylene glycol 3350 17 Gram(s) Oral daily  predniSONE   Tablet 40 milliGRAM(s) Oral daily  senna 2 Tablet(s) Oral at bedtime  tiotropium 2.5 MICROgram(s) Inhaler 2 Puff(s) Inhalation daily    LABS:	 	                            12.7   12.02 )-----------( 212      ( 17 Dec 2023 08:20 )             38.4     12-16    136  |  92<L>  |  37.7<H>  ----------------------------<  316<H>  4.5   |  32.0<H>  |  1.37<H>    Ca    9.8      16 Dec 2023 15:20  Mg     2.1     12-17    TPro  7.2  /  Alb  4.0  /  TBili  0.5  /  DBili  x   /  AST  35<H>  /  ALT  28  /  AlkPhos  138<H>  12-16    proBNP:   Lipid Profile: Date: 12-17 @ 08:20  Total cholesterol 140; Direct LDL: --; HDL: 70; Triglycerides:133    HgA1c:   TSH: Thyroid Stimulating Hormone, Serum: 1.51 uIU/mL  Thyroid Stimulating Hormone, Serum: 3.02 uIU/mL        TELEMETRY:   ECG:  	      DIAGNOSTIC TESTING:  [ ] Echocardiogram:   [ ]  Catheterization:  [ ] Stress Test:    OTHER: 	                                                                [facility]                                                         CARDIOLOGY AT [place] [address]                                                         Telephone: [phone]. Fax:[phone]                                                                             PROGRESS NOTE    Reason for follow up: CHF  Update: Diuresing well    IN: 0 mL / OUT: 3450 mL / NET: -3450 mL  Feeling better  still on bipap    Review of symptoms:   Cardiac:  No chest pain. No dyspnea. No palpitations.  Respiratory: no cough. No dyspnea  Gastrointestinal: No diarrhea. No abdominal pain. No bleeding.   Neuro: No focal neuro complaints.      Vitals:  T(C): 37.2 (12-16-23 @ 18:37), Max: 37.3 (12-16-23 @ 16:49)  HR: 81 (12-17-23 @ 09:36) (74 - 98)  BP: 137/83 (12-17-23 @ 08:10) (133/86 - 179/76)  RR: 24 (12-17-23 @ 08:10) (18 - 34)  SpO2: 95% (12-17-23 @ 09:36) (95% - 100%)  Wt(kg): --  I&O's Summary    16 Dec 2023 07:01  -  17 Dec 2023 07:00  --------------------------------------------------------  IN: 0 mL / OUT: 3450 mL / NET: -3450 mL  17 Dec 2023 07:01  -  17 Dec 2023 10:06  --------------------------------------------------------  IN: 0 mL / OUT: 1000 mL / NET: -1000 mL  Weight (kg): 177.4 (12-16 @ 10:02)      PHYSICAL EXAM:  Appearance: Lying on side on bipap  HEENT:  Atraumatic. Normocephalic.  Normal oral mucosa  Neurologic: A & O x 3, no gross focal deficits.  Cardiovascular: RRR S1 S2, decreased  Respiratory: Lungs decreased lung sounds  Gastrointestinal:  Soft, Non-tender, + BS  Lower Extremities: + edema  Psychiatry: Patient is calm. No agitation.   Skin: warm and dry.      CURRENT MEDICATIONS:  MEDICATIONS  (STANDING):  atorvastatin 80 milliGRAM(s) Oral at bedtime  budesonide  80 MICROgram(s)/formoterol 4.5 MICROgram(s) Inhaler 2 Puff(s) Inhalation two times a day  doxycycline IVPB      doxycycline IVPB 100 milliGRAM(s) IV Intermittent every 12 hours  furosemide   Injectable 40 milliGRAM(s) IV Push daily  glucagon  Injectable 1 milliGRAM(s) IntraMuscular once  insulin glargine Injectable (LANTUS) 35 Unit(s) SubCutaneous at bedtime  insulin lispro (ADMELOG) corrective regimen sliding scale   SubCutaneous every 6 hours  lactulose Syrup 20 Gram(s) Oral daily  oxyCODONE  ER Tablet 60 milliGRAM(s) Oral every 12 hours  piperacillin/tazobactam IVPB.- 3.375 Gram(s) IV Intermittent once  piperacillin/tazobactam IVPB.. 3.375 Gram(s) IV Intermittent every 8 hours  polyethylene glycol 3350 17 Gram(s) Oral daily  predniSONE   Tablet 40 milliGRAM(s) Oral daily  senna 2 Tablet(s) Oral at bedtime  tiotropium 2.5 MICROgram(s) Inhaler 2 Puff(s) Inhalation daily    LABS:	 	                            12.7   12.02 )-----------( 212      ( 17 Dec 2023 08:20 )             38.4     12-16    136  |  92<L>  |  37.7<H>  ----------------------------<  316<H>  4.5   |  32.0<H>  |  1.37<H>    Ca    9.8      16 Dec 2023 15:20  Mg     2.1     12-17    TPro  7.2  /  Alb  4.0  /  TBili  0.5  /  DBili  x   /  AST  35<H>  /  ALT  28  /  AlkPhos  138<H>  12-16    proBNP:   Lipid Profile: Date: 12-17 @ 08:20  Total cholesterol 140; Direct LDL: --; HDL: 70; Triglycerides:133    HgA1c:   TSH: Thyroid Stimulating Hormone, Serum: 1.51 uIU/mL  Thyroid Stimulating Hormone, Serum: 3.02 uIU/mL        TELEMETRY:   ECG:  	      DIAGNOSTIC TESTING:  [ ] Echocardiogram:   [ ]  Catheterization:  [ ] Stress Test:    OTHER: 	                                                                [facility]                                                         CARDIOLOGY AT [place] [address]                                                         Telephone: [phone]. Fax:[phone]                                                                             PROGRESS NOTE    Reason for follow up: CHF  Update: Diuresing well    IN: 0 mL / OUT: 3450 mL / NET: -3450 mL  Feeling better  still on bipap    Review of symptoms:   Cardiac:  No chest pain. No dyspnea. No palpitations.  Respiratory: no cough. No dyspnea  Gastrointestinal: No diarrhea. No abdominal pain. No bleeding.   Neuro: No focal neuro complaints.      Vitals:  T(C): 37.2 (12-16-23 @ 18:37), Max: 37.3 (12-16-23 @ 16:49)  HR: 81 (12-17-23 @ 09:36) (74 - 98)  BP: 137/83 (12-17-23 @ 08:10) (133/86 - 179/76)  RR: 24 (12-17-23 @ 08:10) (18 - 34)  SpO2: 95% (12-17-23 @ 09:36) (95% - 100%)  Wt(kg): --  I&O's Summary    16 Dec 2023 07:01  -  17 Dec 2023 07:00  --------------------------------------------------------  IN: 0 mL / OUT: 3450 mL / NET: -3450 mL  17 Dec 2023 07:01  -  17 Dec 2023 10:06  --------------------------------------------------------  IN: 0 mL / OUT: 1000 mL / NET: -1000 mL  Weight (kg): 177.4 (12-16 @ 10:02)      PHYSICAL EXAM:  Appearance: Lying on side on bipap  HEENT:  Atraumatic. Normocephalic.  Normal oral mucosa  Neurologic: A & O x 3, no gross focal deficits.  Cardiovascular: RRR S1 S2, decreased  Respiratory: Lungs decreased lung sounds  Gastrointestinal:  Soft, Non-tender, + BS  Lower Extremities: + edema  Psychiatry: Patient is calm. No agitation.   Skin: warm and dry.      CURRENT MEDICATIONS:  MEDICATIONS  (STANDING):  atorvastatin 80 milliGRAM(s) Oral at bedtime  budesonide  80 MICROgram(s)/formoterol 4.5 MICROgram(s) Inhaler 2 Puff(s) Inhalation two times a day  doxycycline IVPB      doxycycline IVPB 100 milliGRAM(s) IV Intermittent every 12 hours  furosemide   Injectable 40 milliGRAM(s) IV Push daily  glucagon  Injectable 1 milliGRAM(s) IntraMuscular once  insulin glargine Injectable (LANTUS) 35 Unit(s) SubCutaneous at bedtime  insulin lispro (ADMELOG) corrective regimen sliding scale   SubCutaneous every 6 hours  lactulose Syrup 20 Gram(s) Oral daily  oxyCODONE  ER Tablet 60 milliGRAM(s) Oral every 12 hours  piperacillin/tazobactam IVPB.- 3.375 Gram(s) IV Intermittent once  piperacillin/tazobactam IVPB.. 3.375 Gram(s) IV Intermittent every 8 hours  polyethylene glycol 3350 17 Gram(s) Oral daily  predniSONE   Tablet 40 milliGRAM(s) Oral daily  senna 2 Tablet(s) Oral at bedtime  tiotropium 2.5 MICROgram(s) Inhaler 2 Puff(s) Inhalation daily    LABS:	 	                            12.7   12.02 )-----------( 212      ( 17 Dec 2023 08:20 )             38.4     12-16    136  |  92<L>  |  37.7<H>  ----------------------------<  316<H>  4.5   |  32.0<H>  |  1.37<H>    Ca    9.8      16 Dec 2023 15:20  Mg     2.1     12-17    TPro  7.2  /  Alb  4.0  /  TBili  0.5  /  DBili  x   /  AST  35<H>  /  ALT  28  /  AlkPhos  138<H>  12-16    proBNP:   Lipid Profile: Date: 12-17 @ 08:20  Total cholesterol 140; Direct LDL: --; HDL: 70; Triglycerides:133    HgA1c:   TSH: Thyroid Stimulating Hormone, Serum: 1.51 uIU/mL  Thyroid Stimulating Hormone, Serum: 3.02 uIU/mL      < from: TTE Echo Complete w/ Contrast w/ Doppler (08.31.23 @ 08:19) >   1. Technically limited study.   2. Endocardial visualization was enhanced with intravenous echo contrast.   3. Left ventricular ejection fraction, by visual estimation, is 55 to   60%.   4. Grossly Normal global left ventricular systolic function.   5. Spectral Doppler shows pseudonormal pattern of left ventricular   myocardial filling (Grade II diastolic dysfunction).   6. Moderately enlarged right ventricle.   7. Moderately reduced RV systolic function on limited views.   8. The left atrium is normal in size.   9. Mild mitral annular calcification.  10. Mild thickening and calcification of the anterior and posterior   mitral valve leaflets.  11. Mild mitral valve regurgitation.  12. Moderate tricuspid regurgitation.  13. Estimated pulmonary artery systolic pressure is 70.9 mmHg assuming a   right atrial pressure of 5 mmHg, which is consistent with severe   pulmonary hypertension.  14. There is no evidence of pericardial effusion.    < end of copied text >    TELEMETRY:   ECG:  	                                                                      [facility]                                                         CARDIOLOGY AT [place] [address]                                                         Telephone: [phone]. Fax:[phone]                                                                             PROGRESS NOTE    Reason for follow up: CHF  Update: Diuresing well    IN: 0 mL / OUT: 3450 mL / NET: -3450 mL  Feeling better  still on bipap    Review of symptoms:   Cardiac:  No chest pain. No dyspnea. No palpitations.  Respiratory: no cough. No dyspnea  Gastrointestinal: No diarrhea. No abdominal pain. No bleeding.   Neuro: No focal neuro complaints.      Vitals:  T(C): 37.2 (12-16-23 @ 18:37), Max: 37.3 (12-16-23 @ 16:49)  HR: 81 (12-17-23 @ 09:36) (74 - 98)  BP: 137/83 (12-17-23 @ 08:10) (133/86 - 179/76)  RR: 24 (12-17-23 @ 08:10) (18 - 34)  SpO2: 95% (12-17-23 @ 09:36) (95% - 100%)  Wt(kg): --  I&O's Summary    16 Dec 2023 07:01  -  17 Dec 2023 07:00  --------------------------------------------------------  IN: 0 mL / OUT: 3450 mL / NET: -3450 mL  17 Dec 2023 07:01  -  17 Dec 2023 10:06  --------------------------------------------------------  IN: 0 mL / OUT: 1000 mL / NET: -1000 mL  Weight (kg): 177.4 (12-16 @ 10:02)      PHYSICAL EXAM:  Appearance: Lying on side on bipap  HEENT:  Atraumatic. Normocephalic.  Normal oral mucosa  Neurologic: A & O x 3, no gross focal deficits.  Cardiovascular: RRR S1 S2, decreased  Respiratory: Lungs decreased lung sounds  Gastrointestinal:  Soft, Non-tender, + BS  Lower Extremities: + edema  Psychiatry: Patient is calm. No agitation.   Skin: warm and dry.      CURRENT MEDICATIONS:  MEDICATIONS  (STANDING):  atorvastatin 80 milliGRAM(s) Oral at bedtime  budesonide  80 MICROgram(s)/formoterol 4.5 MICROgram(s) Inhaler 2 Puff(s) Inhalation two times a day  doxycycline IVPB      doxycycline IVPB 100 milliGRAM(s) IV Intermittent every 12 hours  furosemide   Injectable 40 milliGRAM(s) IV Push daily  glucagon  Injectable 1 milliGRAM(s) IntraMuscular once  insulin glargine Injectable (LANTUS) 35 Unit(s) SubCutaneous at bedtime  insulin lispro (ADMELOG) corrective regimen sliding scale   SubCutaneous every 6 hours  lactulose Syrup 20 Gram(s) Oral daily  oxyCODONE  ER Tablet 60 milliGRAM(s) Oral every 12 hours  piperacillin/tazobactam IVPB.- 3.375 Gram(s) IV Intermittent once  piperacillin/tazobactam IVPB.. 3.375 Gram(s) IV Intermittent every 8 hours  polyethylene glycol 3350 17 Gram(s) Oral daily  predniSONE   Tablet 40 milliGRAM(s) Oral daily  senna 2 Tablet(s) Oral at bedtime  tiotropium 2.5 MICROgram(s) Inhaler 2 Puff(s) Inhalation daily    LABS:	 	                            12.7   12.02 )-----------( 212      ( 17 Dec 2023 08:20 )             38.4     12-16    136  |  92<L>  |  37.7<H>  ----------------------------<  316<H>  4.5   |  32.0<H>  |  1.37<H>    Ca    9.8      16 Dec 2023 15:20  Mg     2.1     12-17    TPro  7.2  /  Alb  4.0  /  TBili  0.5  /  DBili  x   /  AST  35<H>  /  ALT  28  /  AlkPhos  138<H>  12-16    proBNP:   Lipid Profile: Date: 12-17 @ 08:20  Total cholesterol 140; Direct LDL: --; HDL: 70; Triglycerides:133    HgA1c:   TSH: Thyroid Stimulating Hormone, Serum: 1.51 uIU/mL  Thyroid Stimulating Hormone, Serum: 3.02 uIU/mL      < from: TTE Echo Complete w/ Contrast w/ Doppler (08.31.23 @ 08:19) >   1. Technically limited study.   2. Endocardial visualization was enhanced with intravenous echo contrast.   3. Left ventricular ejection fraction, by visual estimation, is 55 to   60%.   4. Grossly Normal global left ventricular systolic function.   5. Spectral Doppler shows pseudonormal pattern of left ventricular   myocardial filling (Grade II diastolic dysfunction).   6. Moderately enlarged right ventricle.   7. Moderately reduced RV systolic function on limited views.   8. The left atrium is normal in size.   9. Mild mitral annular calcification.  10. Mild thickening and calcification of the anterior and posterior   mitral valve leaflets.  11. Mild mitral valve regurgitation.  12. Moderate tricuspid regurgitation.  13. Estimated pulmonary artery systolic pressure is 70.9 mmHg assuming a   right atrial pressure of 5 mmHg, which is consistent with severe   pulmonary hypertension.  14. There is no evidence of pericardial effusion.    < end of copied text >    TELEMETRY:   ECG:

## 2023-12-17 NOTE — PROGRESS NOTE ADULT - NS ATTEND AMEND GEN_ALL_CORE FT
67 y/o female with hx of HFpEF, chronic respiratory failure on home O2 4L, severe pulmonary HTN, dobutamine stress echo negative for ischemia in 2019, morbid obesity with likely ENRIQUE/OHS, asthma, CKD3, IDDM2, uterine cancer s/p MEGAN, DVT LLE (2004), chronic leg edema 2/2 venous stasis, spinal stenosis with chronic back pain, prior admission in 9/2023 with SOB and elevated D dimer, was unable to undergo confirmatory testing with CTA or V/Q scan, had suboptimal LE dopplers with no evidence of DVT, and was started on AC with eliquis for presumed PE. She has had multiple recent admissions for rectal pain and LE weakness (detailed above) and is currently at [facility]. States she has been experiencing worsening midsternal chest pressure and dyspnea with chills and nonproductive cough; was started on treatment for bronchitis as outpatient. Found to be RSV+ with R sided consolidation suggestive of PNA.   -Fluid status is difficult to assess in this patient. pBNP 1650, may be underestimated in the setting of morbid obesity. Patient also with ongoing infection. She received lasix 80mg IV on admission, needs daily assessment of her volume status and diuretic adjustment. Would hold standing lasix  -Continue IV antibiotics, BIPAP, steroids   -Add norvasc for improved BP control   -AC on hold at this time, no proven history of PE and has been treated for ~3 months   -TTE 8/2023 with normal LVEF, PASP 70 mmHg   -Would benefit from RHC/LHC for further evaluation of her coronaries, filling pressures, and PH. In the past, this has been precluded by patient's weight and kidney function. She since reports significant weight loss; will attempt to complete on this admission once respiratory status improves.  -Bariatric surgery referral once clinical picture improves 67 y/o female with hx of HFpEF, chronic respiratory failure on home O2 4L, severe pulmonary HTN, dobutamine stress echo negative for ischemia in 2019, morbid obesity with likely ENRIQUE/OHS, asthma, CKD3, IDDM2, uterine cancer s/p MEGAN, DVT LLE (2004), chronic leg edema 2/2 venous stasis, spinal stenosis with chronic back pain, prior admission in 9/2023 with SOB and elevated D dimer, was unable to undergo confirmatory testing with CTA or V/Q scan, had suboptimal LE dopplers with no evidence of DVT, and was started on AC with eliquis for presumed PE. She has had multiple recent admissions for rectal pain and LE weakness (detailed above) and is currently at [facility]. States she has been experiencing worsening midsternal chest pressure and dyspnea with chills and nonproductive cough; was started on treatment for bronchitis as outpatient. Found to be RSV+ with R sided consolidation suggestive of PNA.   -Fluid status is difficult to assess in this patient. pBNP 1650, may be underestimated in the setting of morbid obesity. Patient also with ongoing infection. She received lasix 80mg IV on admission, needs daily assessment of her volume status and diuretic adjustment. Would hold standing lasix  -Continue IV antibiotics, BIPAP, steroids   -Add norvasc for improved BP control   -AC on hold at this time, no proven history of PE and has been treated for ~3 months   -TTE 8/2023 with normal LVEF, PASP 70 mmHg   -Would benefit from RHC/LHC for further evaluation of her coronaries, filling pressures, and PH. In the past, this has been precluded by patient's weight and kidney function. She since reports significant weight loss; will attempt to complete on this admission once respiratory status improves.  -Bariatric surgery referral once clinical picture improves

## 2023-12-17 NOTE — PROGRESS NOTE ADULT - PROBLEM SELECTOR PLAN 1
Await repeat TTE  Low na diet   intake and out put  George cath for now  Bipap for resp support  c/w Lasix 40 ivp daily diursing well with this dose  hold ACE-I/ARB at this time given CKD and tentative R/LHC for Monday 12/18  Eliquis on hold for anticipated cath    Cardiac meds  atorvastatin 80 milliGRAM(s) Oral at bedtime  furosemide   Injectable 40 milliGRAM(s) IV Push daily Await repeat TTE  Low na diet   intake and out put  George cath for now  Bipap for resp support  Hold lasix for now  hold ACE-I/ARB at this time given CKD and tentative R/LHC for next week   Eliquis on hold for anticipated cath    Cardiac meds  atorvastatin 80 milliGRAM(s) Oral at bedtime  furosemide   Injectable 40 milliGRAM(s) IV Push daily Await repeat TTE  Low na diet   intake and out put  George cath for now  Bipap for resp support  Hold lasix for now  hold ACE-I/ARB at this time given CKD and tentative R/LHC for next week   Eliquis on hold for anticipated cath  will watch off anticoaguaion  No proven PE    Cardiac meds  atorvastatin 80 milliGRAM(s) Oral at bedtime  furosemide   Injectable 40 milliGRAM(s) IV Push daily Await repeat TTE  Low na diet   intake and out put  George cath for now  Bipap for resp support  Hold lasix for now  hold ACE-I/ARB at this time given CKD and tentative R/LHC for next week   Eliquis on hold for anticipated cath  will watch off anticoaguaion  No proven PE  add norvasc for b/p control    Cardiac meds  atorvastatin 80 milliGRAM(s) Oral at bedtime  furosemide   Injectable 40 milliGRAM(s) IV Push daily

## 2023-12-17 NOTE — PROGRESS NOTE ADULT - ASSESSMENT
67 y/o female with hx of HFpEF (EF 55-60% 8/2023), on home O2 4L, pulmonary HTN, On eliquis for  presumed PE and severe pulmonary hypertension (PASP of 70 mmHg).. morbid obesity, asthma, CKD3, IDDM2, uterine cancer s/p MEGAN, DVT LLE (2004), chronic leg edema 2/2 venous stasis, spinal stenosis with chronic back pain who presents with midsternal chest pressure and shortness of breath. ( Ischemic testing has been deferred due to renal function and patients weight0  bnp 1650  trop 45 (flat curve)  Ekg no ischemic changes TTE is pending  CXR with chf

## 2023-12-17 NOTE — PROGRESS NOTE ADULT - ASSESSMENT
67 yo female with HTN, HLD, DM2, HFpEF, CKD III, DVT, Uterine CA, COPD on home o2 who presented with complaints of shortness of breath. Patient reports that she has been sick all week and was recently started on Vantin and steroids while at the nursing home. Patient reports that her dyspnea just worsened and she told the nursing home to send her in for an evaluation. While in the ED, patient was placed on BIPAP and was given IV lasix with some improvement. RVP then results as RSV +. Patient had a CT scan which also showed pneumonia and pulmonary HTN. Admission to medicine was requested for further management.    #Acute on chronic respiratory failure/ likely multifactorial   #Acute HFpEF exacerbation  # likely with Superimposed bacterial Pneumonia in setting of RSV  #Pulmonary hypertension  Admit to SDU  ct noted with rt lung pna   BNP elevated  +RSV  c/w Zosyn/doxy   will give iv steroids trial   f/u Blood cx, sputum cx, Urine legionella, urine strep, MRSA swab  s/p IV lasix 40, now on hold as per cardio   f/u echo  Strict in and out  Cardiology consulted, plan for cath in am   BIPAP- Unable to be titrated off of BIPAP  pulm consulted     #Chronic pain  continue home Pain meds of Oxycodone 60 mg q12h and Oxycodone 5mg q6h PRN    #hx of  suspicion for PE.DVT  Patient has empirically been treated for PE/DVT since August  No scans noted to have PE/DVT  check duplex lower ext to r/o dvt   Will hold AC for now given that patient will be going for RHC tomorrow     #HLD  Atorvastatin for rosuvastatin    #COPD  Steroids as above  Symbicort, Spiriva, albuterol  pulm consulted     #DM  Will place on Lantus 35   NPO for now  ISS  a1c    #DVT prophylaxis: SCDs, plan for cath in am     plan of care discussed with patient, cardio

## 2023-12-17 NOTE — CONSULT NOTE ADULT - ASSESSMENT
for now    69 yo woman with multiple issues presents with chest discomfort, pain, and increased dyspnea over last few days.  She was treated with Vantin and steroids by the physician at [place] for bronchitis  [facility] is [facility]    She  has known pulmonary hypertension with PA of 70 last summer  Hx of DVT and possible recent PE--she was anticoagulated since August  Chronic lower extermity edema, morbid obesity  She is on a bronchodilator regimen for asthma with PRN oxygen  She has had recent issues with rectal fissure,  Hx of Uterine Cancer, CKD3,     Imp  69 yo woman with asthma/airway disease, severe pulmonary hypertension and probable TBE in past on Eliquis  She has been treated for bronchitis, for the past few days  Note ++RSV PCR testing  CT suggests possible Right lung infiltrate or at least some atelectasis in RML  cannot rule out pneumonia so agree with antibiotics    However, she also has respiratory insufficiency requiring NIV which is likely due to multipfactorial reasons:  Morbid obesity, underlying airways disease, current RSV infection with bronchitis, possible pneumonitis  Pulmonary Hypertension and RV dysfunction is being addressed with diuresis and supplemental oxygen now  Would defer RHC for a bit until her bronchitis/RSV infection improves    for now    69 yo woman with multiple issues presents with chest discomfort, pain, and increased dyspnea over last few days.  She was treated with Vantin and steroids by the physician at [place] for bronchitis  She is at [facility]    She  has known pulmonary hypertension with PA of 70 last summer  Hx of DVT and possible recent PE--she was anticoagulated since August  Chronic lower extermity edema, morbid obesity  She is on a bronchodilator regimen for asthma with PRN oxygen  She has had recent issues with rectal fissure,  Hx of Uterine Cancer, CKD3,     Imp  69 yo woman with asthma/airway disease, severe pulmonary hypertension and probable TBE in past on Eliquis  She has been treated for bronchitis, for the past few days  Note ++RSV PCR testing  CT suggests possible Right lung infiltrate or at least some atelectasis in RML  cannot rule out pneumonia so agree with antibiotics    However, she also has respiratory insufficiency requiring NIV which is likely due to multipfactorial reasons:  Morbid obesity, underlying airways disease, current RSV infection with bronchitis, possible pneumonitis  Pulmonary Hypertension and RV dysfunction is being addressed with diuresis and supplemental oxygen now  Would defer RHC for a bit until her bronchitis/RSV infection improves

## 2023-12-17 NOTE — CONSULT NOTE ADULT - SUBJECTIVE AND OBJECTIVE BOX
PULMONARY CONSULT NOTE      AGUEDA LUISRAYMUNDO-[id number]    Patient is a 68y old  Female who presents with a chief complaint of SOB (17 Dec 2023 10:05)    67 yo woman with multiple issues presents with chest discomfort, pain, and increased dyspnea over last few days.  She was treated with Vantin and steroids by the physician at [facility] for bronchitis  She is at [facility]    She  has known pulmonary hypertension with PA of 70 last summer  Hx of DVT and possible recent PE--she was anticoagulated since August  Chronic lower extermity edema, morbid obesity  She is on a bronchodilator regimen for asthma with PRN oxygen  She has had recent issues with rectal fissure,  Hx of Uterine Cancer, CKD3,        INTERVAL HPI/OVERNIGHT EVENTS:    MEDICATIONS  (STANDING):  atorvastatin 80 milliGRAM(s) Oral at bedtime  budesonide  80 MICROgram(s)/formoterol 4.5 MICROgram(s) Inhaler 2 Puff(s) Inhalation two times a day  dextrose 5%. 1000 milliLiter(s) (100 mL/Hr) IV Continuous <Continuous>  dextrose 5%. 1000 milliLiter(s) (50 mL/Hr) IV Continuous <Continuous>  dextrose 50% Injectable 25 Gram(s) IV Push once  dextrose 50% Injectable 12.5 Gram(s) IV Push once  dextrose 50% Injectable 25 Gram(s) IV Push once  doxycycline IVPB      doxycycline IVPB 100 milliGRAM(s) IV Intermittent every 12 hours  glucagon  Injectable 1 milliGRAM(s) IntraMuscular once  insulin glargine Injectable (LANTUS) 45 Unit(s) SubCutaneous at bedtime  insulin lispro (ADMELOG) corrective regimen sliding scale   SubCutaneous every 6 hours  insulin lispro Injectable (ADMELOG) 5 Unit(s) SubCutaneous three times a day before meals  lactulose Syrup 20 Gram(s) Oral daily  oxyCODONE  ER Tablet 60 milliGRAM(s) Oral every 12 hours  piperacillin/tazobactam IVPB.- 3.375 Gram(s) IV Intermittent once  piperacillin/tazobactam IVPB.. 3.375 Gram(s) IV Intermittent every 8 hours  polyethylene glycol 3350 17 Gram(s) Oral daily  predniSONE   Tablet 40 milliGRAM(s) Oral daily  senna 2 Tablet(s) Oral at bedtime  tiotropium 2.5 MICROgram(s) Inhaler 2 Puff(s) Inhalation daily      MEDICATIONS  (PRN):  acetaminophen     Tablet .. 650 milliGRAM(s) Oral every 6 hours PRN Temp greater or equal to 38C (100.4F), Mild Pain (1 - 3)  albuterol    90 MICROgram(s) HFA Inhaler 2 Puff(s) Inhalation every 2 hours PRN Shortness of Breath and/or Wheezing  ALPRAZolam 0.25 milliGRAM(s) Oral at bedtime PRN for anxiety/Sleep  dextrose Oral Gel 15 Gram(s) Oral once PRN Blood Glucose LESS THAN 70 milliGRAM(s)/deciliter  diphenhydrAMINE 25 milliGRAM(s) Oral every 6 hours PRN Rash and/or Itching  melatonin 3 milliGRAM(s) Oral at bedtime PRN Insomnia  ondansetron Injectable 4 milliGRAM(s) IV Push every 8 hours PRN Nausea and/or Vomiting  oxyCODONE    IR 5 milliGRAM(s) Oral every 6 hours PRN Moderate Pain (4 - 6)      Allergies    wool- rash, itch (Other)  adhesives (Rash)  latex (Rash)  Bactrim (Flushing)    Intolerances        PAST MEDICAL & SURGICAL HISTORY:  Diabetes Mellitus Type II      HTN (Hypertension)      Endometrial Hyperplasia      Cervical Stenosis of Spine      Spinal Stenosis, Lumbar      Deep Vein Thrombosis (DVT)  Left leg, 2004, treated and resolved      Dyslipidemia      Cataract      Morbid Obesity      Vitamin D deficiency      Insomnia      CKD (chronic kidney disease)  ~ III      Congestive heart failure  ~ HFpEF      Uterine cancer      Asthma      On home oxygen therapy      Gait difficulty  ~ u ses walker      Cataract extracted with lens implant 1998  Right      C Section 1994      Cholecystectomy/appendectomy @ age 25      D&C x2 1980's      D&C 2008  hysteroscopy, endometrial hyperplasia, 2009      Cervical Spinal Stenosis surgery x2 (01/2002, 7/2002)      Tonsillectomy as a child      Endometrial biopsy 12/02/09      H/O laser iridotomy  left eye, 2016      H/O colonoscopy  1998      S/P total abdominal hysterectomy and bilateral salpingo-oophorectomy      S/P appendectomy      S/P cholecystectomy          FAMILY HISTORY:  Family history of pancreatic cancer    Family history of bladder cancer    Family history of lung cancer (Grandparent)        SOCIAL HISTORY  Smoking History:     REVIEW OF SYSTEMS:    CONSTITUTIONAL:  As per HPI.    HEENT:  Eyes:  No diplopia or blurred vision. ENT:  No earache, sore throat or runny nose.    CARDIOVASCULAR:  No pressure, squeezing, tightness, heaviness or aching about the chest; no palpitations.    RESPIRATORY:  No cough, shortness of breath, PND or orthopnea. Mild SOBOE    GASTROINTESTINAL:  No nausea, vomiting or diarrhea.    GENITOURINARY:  No dysuria, frequency or urgency.    MUSCULOSKELETAL:  No joint pains    SKIN:  No new lesions.    NEUROLOGIC:  No paresthesias, fasciculations, seizures or weakness.    PSYCHIATRIC:  No disorder of thought or mood.    ENDOCRINE:  No heat or cold intolerance, polyuria or polydipsia.    HEMATOLOGICAL:  No easy bruising or bleeding.     Vital Signs Last 24 Hrs  T(C): 37 (17 Dec 2023 10:30), Max: 37.3 (16 Dec 2023 16:49)  T(F): 98.6 (17 Dec 2023 10:30), Max: 99.1 (16 Dec 2023 16:49)  HR: 80 (17 Dec 2023 10:30) (74 - 98)  BP: 160/68 (17 Dec 2023 10:30) (133/86 - 179/76)  BP(mean): 104 (17 Dec 2023 08:10) (104 - 104)  RR: 23 (17 Dec 2023 10:30) (18 - 30)  SpO2: 96% (17 Dec 2023 12:17) (92% - 100%)    Parameters below as of 17 Dec 2023 10:30  Patient On (Oxygen Delivery Method): nasal cannula  O2 Flow (L/min): 4      PHYSICAL EXAMINATION:    GENERAL: The patient is a well-developed, obese woman in no apparent distress.     HEENT: Head is normocephalic and atraumatic. Extraocular muscles are intact. Mucous membranes are moist.     NECK: Supple.     LUNGS: Clear to auscultation without wheezing, rales, or rhonchi. Respirations unlabored    HEART: Regular rate and rhythm without murmur.    ABDOMEN: Soft, nontender, and nondistended.  No hepatosplenomegaly is noted.    EXTREMITIES: Without any cyanosis, clubbing, rash, lesions or edema.    NEUROLOGIC: Grossly intact.    SKIN: No ulceration or induration present.      LABS:  Resp Syncytial Virus (RapRVP): Detected (12.16.23 @ 10:18)                         12.7   12.02 )-----------( 212      ( 17 Dec 2023 08:20 )             38.4     12-17    135  |  89<L>  |  40.0<H>  ----------------------------<  420<H>  4.3   |  33.0<H>  |  1.56<H>    Ca    9.5      17 Dec 2023 08:20  Mg     2.1     12-17    TPro  6.7  /  Alb  3.6  /  TBili  0.6  /  DBili  x   /  AST  19  /  ALT  23  /  AlkPhos  119  12-17    PT/INR - ( 16 Dec 2023 10:18 )   PT: 14.6 sec;   INR: 1.33 ratio         PTT - ( 16 Dec 2023 10:18 )  PTT:31.7 sec  Urinalysis Basic - ( 17 Dec 2023 08:20 )    Color: x / Appearance: x / SG: x / pH: x  Gluc: 420 mg/dL / Ketone: x  / Bili: x / Urobili: x   Blood: x / Protein: x / Nitrite: x   Leuk Esterase: x / RBC: x / WBC x   Sq Epi: x / Non Sq Epi: x / Bacteria: x      ABG - ( 17 Dec 2023 09:29 )  pH, Arterial: 7.400 pH, Blood: x     /  pCO2: 67    /  pO2: 117   / HCO3: 42    / Base Excess: 16.7  /  SaO2: 98.3                            MICROBIOLOGY:    RADIOLOGY & ADDITIONAL STUDIES:< from: CT Chest No Cont (12.16.23 @ 13:27) >  ACC: [id number] EXAM:  CT CHEST   ORDERED BY: OFELIA RODRIGUEZ     PROCEDURE DATE:  12/16/2023          INTERPRETATION:  INDICATION: Shortness of breath    TECHNIQUE: A volumetric CT acquisition of the chest was obtained from the   thoracic inlet to theupper abdomen without the use of intravenous   contrast. Coronal and sagittal reconstructed images are provided.    COMPARISON: Chest CT 7/28/2020    FINDINGS:    Lungs/Airways/Pleura: Imaging was acquired in expiration. Mosaic lung   parenchyma. No pleural effusion. There is diffuse peribronchovascular   consolidation throughout the right lung, with associated obstruction of   several distal segmental and subsegmental bronchi.    Mediastinum/Lymph nodes: No thoracic adenopathy.    Heart and Vessels: Enlarged pulmonary artery which can be seen with   pulmonary hypertension. The heart appears enlarged. No pericardial   effusion. Qualitatively mild coronary artery calcification. Mitral   annular calcification.    Upper Abdomen: Limited by patient body habitus. Cholecystectomy. Stable   3.4 cm right adrenal adenoma.    Osseous structures and Soft Tissues: No aggressive bone lesions.    IMPRESSION:  Right lung peribronchovascular consolidation, concerning for pneumonia.   Follow-up chest CT in 8-12 weeks to resolution.    Cardiomegaly. Enlarged pulmonary artery which can be seen with pulmonary   hypertension.    --- End of Report ---    < end of copied text >   PULMONARY CONSULT NOTE      AGUEDA LUISRAYMUNDO-[id number]    Patient is a 68y old  Female who presents with a chief complaint of SOB (17 Dec 2023 10:05)    67 yo woman with multiple issues presents with chest discomfort, pain, and increased dyspnea over last few days.  She was treated with Vantin and steroids by the physician at [place] for bronchitis  She is at [facility]    She  has known pulmonary hypertension with PA of 70 last summer  Hx of DVT and possible recent PE--she was anticoagulated since August  Chronic lower extermity edema, morbid obesity  She is on a bronchodilator regimen for asthma with PRN oxygen  She has had recent issues with rectal fissure,  Hx of Uterine Cancer, CKD3,        INTERVAL HPI/OVERNIGHT EVENTS:    MEDICATIONS  (STANDING):  atorvastatin 80 milliGRAM(s) Oral at bedtime  budesonide  80 MICROgram(s)/formoterol 4.5 MICROgram(s) Inhaler 2 Puff(s) Inhalation two times a day  dextrose 5%. 1000 milliLiter(s) (100 mL/Hr) IV Continuous <Continuous>  dextrose 5%. 1000 milliLiter(s) (50 mL/Hr) IV Continuous <Continuous>  dextrose 50% Injectable 25 Gram(s) IV Push once  dextrose 50% Injectable 12.5 Gram(s) IV Push once  dextrose 50% Injectable 25 Gram(s) IV Push once  doxycycline IVPB      doxycycline IVPB 100 milliGRAM(s) IV Intermittent every 12 hours  glucagon  Injectable 1 milliGRAM(s) IntraMuscular once  insulin glargine Injectable (LANTUS) 45 Unit(s) SubCutaneous at bedtime  insulin lispro (ADMELOG) corrective regimen sliding scale   SubCutaneous every 6 hours  insulin lispro Injectable (ADMELOG) 5 Unit(s) SubCutaneous three times a day before meals  lactulose Syrup 20 Gram(s) Oral daily  oxyCODONE  ER Tablet 60 milliGRAM(s) Oral every 12 hours  piperacillin/tazobactam IVPB.- 3.375 Gram(s) IV Intermittent once  piperacillin/tazobactam IVPB.. 3.375 Gram(s) IV Intermittent every 8 hours  polyethylene glycol 3350 17 Gram(s) Oral daily  predniSONE   Tablet 40 milliGRAM(s) Oral daily  senna 2 Tablet(s) Oral at bedtime  tiotropium 2.5 MICROgram(s) Inhaler 2 Puff(s) Inhalation daily      MEDICATIONS  (PRN):  acetaminophen     Tablet .. 650 milliGRAM(s) Oral every 6 hours PRN Temp greater or equal to 38C (100.4F), Mild Pain (1 - 3)  albuterol    90 MICROgram(s) HFA Inhaler 2 Puff(s) Inhalation every 2 hours PRN Shortness of Breath and/or Wheezing  ALPRAZolam 0.25 milliGRAM(s) Oral at bedtime PRN for anxiety/Sleep  dextrose Oral Gel 15 Gram(s) Oral once PRN Blood Glucose LESS THAN 70 milliGRAM(s)/deciliter  diphenhydrAMINE 25 milliGRAM(s) Oral every 6 hours PRN Rash and/or Itching  melatonin 3 milliGRAM(s) Oral at bedtime PRN Insomnia  ondansetron Injectable 4 milliGRAM(s) IV Push every 8 hours PRN Nausea and/or Vomiting  oxyCODONE    IR 5 milliGRAM(s) Oral every 6 hours PRN Moderate Pain (4 - 6)      Allergies    wool- rash, itch (Other)  adhesives (Rash)  latex (Rash)  Bactrim (Flushing)    Intolerances        PAST MEDICAL & SURGICAL HISTORY:  Diabetes Mellitus Type II      HTN (Hypertension)      Endometrial Hyperplasia      Cervical Stenosis of Spine      Spinal Stenosis, Lumbar      Deep Vein Thrombosis (DVT)  Left leg, 2004, treated and resolved      Dyslipidemia      Cataract      Morbid Obesity      Vitamin D deficiency      Insomnia      CKD (chronic kidney disease)  ~ III      Congestive heart failure  ~ HFpEF      Uterine cancer      Asthma      On home oxygen therapy      Gait difficulty  ~ u ses walker      Cataract extracted with lens implant 1998  Right      C Section 1994      Cholecystectomy/appendectomy @ age 25      D&C x2 1980's      D&C 2008  hysteroscopy, endometrial hyperplasia, 2009      Cervical Spinal Stenosis surgery x2 (01/2002, 7/2002)      Tonsillectomy as a child      Endometrial biopsy 12/02/09      H/O laser iridotomy  left eye, 2016      H/O colonoscopy  1998      S/P total abdominal hysterectomy and bilateral salpingo-oophorectomy      S/P appendectomy      S/P cholecystectomy          FAMILY HISTORY:  Family history of pancreatic cancer    Family history of bladder cancer    Family history of lung cancer (Grandparent)        SOCIAL HISTORY  Smoking History:     REVIEW OF SYSTEMS:    CONSTITUTIONAL:  As per HPI.    HEENT:  Eyes:  No diplopia or blurred vision. ENT:  No earache, sore throat or runny nose.    CARDIOVASCULAR:  No pressure, squeezing, tightness, heaviness or aching about the chest; no palpitations.    RESPIRATORY:  No cough, shortness of breath, PND or orthopnea. Mild SOBOE    GASTROINTESTINAL:  No nausea, vomiting or diarrhea.    GENITOURINARY:  No dysuria, frequency or urgency.    MUSCULOSKELETAL:  No joint pains    SKIN:  No new lesions.    NEUROLOGIC:  No paresthesias, fasciculations, seizures or weakness.    PSYCHIATRIC:  No disorder of thought or mood.    ENDOCRINE:  No heat or cold intolerance, polyuria or polydipsia.    HEMATOLOGICAL:  No easy bruising or bleeding.     Vital Signs Last 24 Hrs  T(C): 37 (17 Dec 2023 10:30), Max: 37.3 (16 Dec 2023 16:49)  T(F): 98.6 (17 Dec 2023 10:30), Max: 99.1 (16 Dec 2023 16:49)  HR: 80 (17 Dec 2023 10:30) (74 - 98)  BP: 160/68 (17 Dec 2023 10:30) (133/86 - 179/76)  BP(mean): 104 (17 Dec 2023 08:10) (104 - 104)  RR: 23 (17 Dec 2023 10:30) (18 - 30)  SpO2: 96% (17 Dec 2023 12:17) (92% - 100%)    Parameters below as of 17 Dec 2023 10:30  Patient On (Oxygen Delivery Method): nasal cannula  O2 Flow (L/min): 4      PHYSICAL EXAMINATION:    GENERAL: The patient is a well-developed, obese woman in no apparent distress.     HEENT: Head is normocephalic and atraumatic. Extraocular muscles are intact. Mucous membranes are moist.     NECK: Supple.     LUNGS: Clear to auscultation without wheezing, rales, or rhonchi. Respirations unlabored    HEART: Regular rate and rhythm without murmur.    ABDOMEN: Soft, nontender, and nondistended.  No hepatosplenomegaly is noted.    EXTREMITIES: Without any cyanosis, clubbing, rash, lesions or edema.    NEUROLOGIC: Grossly intact.    SKIN: No ulceration or induration present.      LABS:  Resp Syncytial Virus (RapRVP): Detected (12.16.23 @ 10:18)                         12.7   12.02 )-----------( 212      ( 17 Dec 2023 08:20 )             38.4     12-17    135  |  89<L>  |  40.0<H>  ----------------------------<  420<H>  4.3   |  33.0<H>  |  1.56<H>    Ca    9.5      17 Dec 2023 08:20  Mg     2.1     12-17    TPro  6.7  /  Alb  3.6  /  TBili  0.6  /  DBili  x   /  AST  19  /  ALT  23  /  AlkPhos  119  12-17    PT/INR - ( 16 Dec 2023 10:18 )   PT: 14.6 sec;   INR: 1.33 ratio         PTT - ( 16 Dec 2023 10:18 )  PTT:31.7 sec  Urinalysis Basic - ( 17 Dec 2023 08:20 )    Color: x / Appearance: x / SG: x / pH: x  Gluc: 420 mg/dL / Ketone: x  / Bili: x / Urobili: x   Blood: x / Protein: x / Nitrite: x   Leuk Esterase: x / RBC: x / WBC x   Sq Epi: x / Non Sq Epi: x / Bacteria: x      ABG - ( 17 Dec 2023 09:29 )  pH, Arterial: 7.400 pH, Blood: x     /  pCO2: 67    /  pO2: 117   / HCO3: 42    / Base Excess: 16.7  /  SaO2: 98.3                            MICROBIOLOGY:    RADIOLOGY & ADDITIONAL STUDIES:< from: CT Chest No Cont (12.16.23 @ 13:27) >  ACC: [id number] EXAM:  CT CHEST   ORDERED BY: OFELIA RODRIGUEZ     PROCEDURE DATE:  12/16/2023          INTERPRETATION:  INDICATION: Shortness of breath    TECHNIQUE: A volumetric CT acquisition of the chest was obtained from the   thoracic inlet to theupper abdomen without the use of intravenous   contrast. Coronal and sagittal reconstructed images are provided.    COMPARISON: Chest CT 7/28/2020    FINDINGS:    Lungs/Airways/Pleura: Imaging was acquired in expiration. Mosaic lung   parenchyma. No pleural effusion. There is diffuse peribronchovascular   consolidation throughout the right lung, with associated obstruction of   several distal segmental and subsegmental bronchi.    Mediastinum/Lymph nodes: No thoracic adenopathy.    Heart and Vessels: Enlarged pulmonary artery which can be seen with   pulmonary hypertension. The heart appears enlarged. No pericardial   effusion. Qualitatively mild coronary artery calcification. Mitral   annular calcification.    Upper Abdomen: Limited by patient body habitus. Cholecystectomy. Stable   3.4 cm right adrenal adenoma.    Osseous structures and Soft Tissues: No aggressive bone lesions.    IMPRESSION:  Right lung peribronchovascular consolidation, concerning for pneumonia.   Follow-up chest CT in 8-12 weeks to resolution.    Cardiomegaly. Enlarged pulmonary artery which can be seen with pulmonary   hypertension.    --- End of Report ---    < end of copied text >

## 2023-12-18 LAB
ALBUMIN SERPL ELPH-MCNC: 3.9 G/DL — SIGNIFICANT CHANGE UP (ref 3.3–5.2)
ALBUMIN SERPL ELPH-MCNC: 3.9 G/DL — SIGNIFICANT CHANGE UP (ref 3.3–5.2)
ALP SERPL-CCNC: 109 U/L — SIGNIFICANT CHANGE UP (ref 40–120)
ALP SERPL-CCNC: 109 U/L — SIGNIFICANT CHANGE UP (ref 40–120)
ALT FLD-CCNC: 27 U/L — SIGNIFICANT CHANGE UP
ALT FLD-CCNC: 27 U/L — SIGNIFICANT CHANGE UP
ANION GAP SERPL CALC-SCNC: 13 MMOL/L — SIGNIFICANT CHANGE UP (ref 5–17)
ANION GAP SERPL CALC-SCNC: 13 MMOL/L — SIGNIFICANT CHANGE UP (ref 5–17)
AST SERPL-CCNC: 22 U/L — SIGNIFICANT CHANGE UP
AST SERPL-CCNC: 22 U/L — SIGNIFICANT CHANGE UP
BILIRUB SERPL-MCNC: 0.8 MG/DL — SIGNIFICANT CHANGE UP (ref 0.4–2)
BILIRUB SERPL-MCNC: 0.8 MG/DL — SIGNIFICANT CHANGE UP (ref 0.4–2)
BUN SERPL-MCNC: 43.1 MG/DL — HIGH (ref 8–20)
BUN SERPL-MCNC: 43.1 MG/DL — HIGH (ref 8–20)
CALCIUM SERPL-MCNC: 9.6 MG/DL — SIGNIFICANT CHANGE UP (ref 8.4–10.5)
CALCIUM SERPL-MCNC: 9.6 MG/DL — SIGNIFICANT CHANGE UP (ref 8.4–10.5)
CHLORIDE SERPL-SCNC: 91 MMOL/L — LOW (ref 96–108)
CHLORIDE SERPL-SCNC: 91 MMOL/L — LOW (ref 96–108)
CO2 SERPL-SCNC: 34 MMOL/L — HIGH (ref 22–29)
CO2 SERPL-SCNC: 34 MMOL/L — HIGH (ref 22–29)
CREAT SERPL-MCNC: 1.44 MG/DL — HIGH (ref 0.5–1.3)
CREAT SERPL-MCNC: 1.44 MG/DL — HIGH (ref 0.5–1.3)
EGFR: 39 ML/MIN/1.73M2 — LOW
EGFR: 39 ML/MIN/1.73M2 — LOW
GLUCOSE BLDC GLUCOMTR-MCNC: 270 MG/DL — HIGH (ref 70–99)
GLUCOSE BLDC GLUCOMTR-MCNC: 270 MG/DL — HIGH (ref 70–99)
GLUCOSE BLDC GLUCOMTR-MCNC: 307 MG/DL — HIGH (ref 70–99)
GLUCOSE BLDC GLUCOMTR-MCNC: 307 MG/DL — HIGH (ref 70–99)
GLUCOSE BLDC GLUCOMTR-MCNC: 320 MG/DL — HIGH (ref 70–99)
GLUCOSE BLDC GLUCOMTR-MCNC: 320 MG/DL — HIGH (ref 70–99)
GLUCOSE BLDC GLUCOMTR-MCNC: 321 MG/DL — HIGH (ref 70–99)
GLUCOSE BLDC GLUCOMTR-MCNC: 321 MG/DL — HIGH (ref 70–99)
GLUCOSE BLDC GLUCOMTR-MCNC: 326 MG/DL — HIGH (ref 70–99)
GLUCOSE BLDC GLUCOMTR-MCNC: 326 MG/DL — HIGH (ref 70–99)
GLUCOSE BLDC GLUCOMTR-MCNC: 358 MG/DL — HIGH (ref 70–99)
GLUCOSE BLDC GLUCOMTR-MCNC: 358 MG/DL — HIGH (ref 70–99)
GLUCOSE BLDC GLUCOMTR-MCNC: 384 MG/DL — HIGH (ref 70–99)
GLUCOSE BLDC GLUCOMTR-MCNC: 384 MG/DL — HIGH (ref 70–99)
GLUCOSE SERPL-MCNC: 356 MG/DL — HIGH (ref 70–99)
GLUCOSE SERPL-MCNC: 356 MG/DL — HIGH (ref 70–99)
HCT VFR BLD CALC: 41.2 % — SIGNIFICANT CHANGE UP (ref 34.5–45)
HCT VFR BLD CALC: 41.2 % — SIGNIFICANT CHANGE UP (ref 34.5–45)
HGB BLD-MCNC: 13.2 G/DL — SIGNIFICANT CHANGE UP (ref 11.5–15.5)
HGB BLD-MCNC: 13.2 G/DL — SIGNIFICANT CHANGE UP (ref 11.5–15.5)
MAGNESIUM SERPL-MCNC: 2 MG/DL — SIGNIFICANT CHANGE UP (ref 1.6–2.6)
MAGNESIUM SERPL-MCNC: 2 MG/DL — SIGNIFICANT CHANGE UP (ref 1.6–2.6)
MCHC RBC-ENTMCNC: 29.1 PG — SIGNIFICANT CHANGE UP (ref 27–34)
MCHC RBC-ENTMCNC: 29.1 PG — SIGNIFICANT CHANGE UP (ref 27–34)
MCHC RBC-ENTMCNC: 32 GM/DL — SIGNIFICANT CHANGE UP (ref 32–36)
MCHC RBC-ENTMCNC: 32 GM/DL — SIGNIFICANT CHANGE UP (ref 32–36)
MCV RBC AUTO: 90.7 FL — SIGNIFICANT CHANGE UP (ref 80–100)
MCV RBC AUTO: 90.7 FL — SIGNIFICANT CHANGE UP (ref 80–100)
PLATELET # BLD AUTO: 231 K/UL — SIGNIFICANT CHANGE UP (ref 150–400)
PLATELET # BLD AUTO: 231 K/UL — SIGNIFICANT CHANGE UP (ref 150–400)
POTASSIUM SERPL-MCNC: 4 MMOL/L — SIGNIFICANT CHANGE UP (ref 3.5–5.3)
POTASSIUM SERPL-MCNC: 4 MMOL/L — SIGNIFICANT CHANGE UP (ref 3.5–5.3)
POTASSIUM SERPL-SCNC: 4 MMOL/L — SIGNIFICANT CHANGE UP (ref 3.5–5.3)
POTASSIUM SERPL-SCNC: 4 MMOL/L — SIGNIFICANT CHANGE UP (ref 3.5–5.3)
PROT SERPL-MCNC: 7.1 G/DL — SIGNIFICANT CHANGE UP (ref 6.6–8.7)
PROT SERPL-MCNC: 7.1 G/DL — SIGNIFICANT CHANGE UP (ref 6.6–8.7)
RBC # BLD: 4.54 M/UL — SIGNIFICANT CHANGE UP (ref 3.8–5.2)
RBC # BLD: 4.54 M/UL — SIGNIFICANT CHANGE UP (ref 3.8–5.2)
RBC # FLD: 13.9 % — SIGNIFICANT CHANGE UP (ref 10.3–14.5)
RBC # FLD: 13.9 % — SIGNIFICANT CHANGE UP (ref 10.3–14.5)
SODIUM SERPL-SCNC: 138 MMOL/L — SIGNIFICANT CHANGE UP (ref 135–145)
SODIUM SERPL-SCNC: 138 MMOL/L — SIGNIFICANT CHANGE UP (ref 135–145)
WBC # BLD: 10.3 K/UL — SIGNIFICANT CHANGE UP (ref 3.8–10.5)
WBC # BLD: 10.3 K/UL — SIGNIFICANT CHANGE UP (ref 3.8–10.5)
WBC # FLD AUTO: 10.3 K/UL — SIGNIFICANT CHANGE UP (ref 3.8–10.5)
WBC # FLD AUTO: 10.3 K/UL — SIGNIFICANT CHANGE UP (ref 3.8–10.5)

## 2023-12-18 PROCEDURE — 99233 SBSQ HOSP IP/OBS HIGH 50: CPT

## 2023-12-18 PROCEDURE — 99234 HOSP IP/OBS SM DT SF/LOW 45: CPT

## 2023-12-18 RX ORDER — IPRATROPIUM/ALBUTEROL SULFATE 18-103MCG
3 AEROSOL WITH ADAPTER (GRAM) INHALATION EVERY 6 HOURS
Refills: 0 | Status: DISCONTINUED | OUTPATIENT
Start: 2023-12-18 | End: 2023-12-20

## 2023-12-18 RX ORDER — INSULIN LISPRO 100/ML
18 VIAL (ML) SUBCUTANEOUS
Refills: 0 | Status: DISCONTINUED | OUTPATIENT
Start: 2023-12-18 | End: 2023-12-19

## 2023-12-18 RX ORDER — HEPARIN SODIUM 5000 [USP'U]/ML
5000 INJECTION INTRAVENOUS; SUBCUTANEOUS EVERY 8 HOURS
Refills: 0 | Status: DISCONTINUED | OUTPATIENT
Start: 2023-12-18 | End: 2023-12-21

## 2023-12-18 RX ORDER — INSULIN GLARGINE 100 [IU]/ML
65 INJECTION, SOLUTION SUBCUTANEOUS AT BEDTIME
Refills: 0 | Status: DISCONTINUED | OUTPATIENT
Start: 2023-12-18 | End: 2023-12-19

## 2023-12-18 RX ORDER — FUROSEMIDE 40 MG
40 TABLET ORAL DAILY
Refills: 0 | Status: DISCONTINUED | OUTPATIENT
Start: 2023-12-18 | End: 2023-12-20

## 2023-12-18 RX ADMIN — SENNA PLUS 2 TABLET(S): 8.6 TABLET ORAL at 22:11

## 2023-12-18 RX ADMIN — OXYCODONE HYDROCHLORIDE 60 MILLIGRAM(S): 5 TABLET ORAL at 06:02

## 2023-12-18 RX ADMIN — Medication 6: at 12:31

## 2023-12-18 RX ADMIN — LACTULOSE 20 GRAM(S): 10 SOLUTION ORAL at 12:17

## 2023-12-18 RX ADMIN — INSULIN GLARGINE 65 UNIT(S): 100 INJECTION, SOLUTION SUBCUTANEOUS at 22:10

## 2023-12-18 RX ADMIN — Medication 18 UNIT(S): at 16:11

## 2023-12-18 RX ADMIN — ATORVASTATIN CALCIUM 80 MILLIGRAM(S): 80 TABLET, FILM COATED ORAL at 22:12

## 2023-12-18 RX ADMIN — Medication 40 MILLIGRAM(S): at 14:18

## 2023-12-18 RX ADMIN — HEPARIN SODIUM 5000 UNIT(S): 5000 INJECTION INTRAVENOUS; SUBCUTANEOUS at 14:18

## 2023-12-18 RX ADMIN — Medication 10: at 02:14

## 2023-12-18 RX ADMIN — OXYCODONE HYDROCHLORIDE 60 MILLIGRAM(S): 5 TABLET ORAL at 18:04

## 2023-12-18 RX ADMIN — Medication 10 UNIT(S): at 08:33

## 2023-12-18 RX ADMIN — PIPERACILLIN AND TAZOBACTAM 25 GRAM(S): 4; .5 INJECTION, POWDER, LYOPHILIZED, FOR SOLUTION INTRAVENOUS at 14:19

## 2023-12-18 RX ADMIN — PIPERACILLIN AND TAZOBACTAM 25 GRAM(S): 4; .5 INJECTION, POWDER, LYOPHILIZED, FOR SOLUTION INTRAVENOUS at 22:10

## 2023-12-18 RX ADMIN — OXYCODONE HYDROCHLORIDE 5 MILLIGRAM(S): 5 TABLET ORAL at 15:27

## 2023-12-18 RX ADMIN — Medication 0.25 MILLIGRAM(S): at 10:31

## 2023-12-18 RX ADMIN — Medication 40 MILLIGRAM(S): at 06:02

## 2023-12-18 RX ADMIN — Medication 40 MILLIGRAM(S): at 12:47

## 2023-12-18 RX ADMIN — HEPARIN SODIUM 5000 UNIT(S): 5000 INJECTION INTRAVENOUS; SUBCUTANEOUS at 22:10

## 2023-12-18 RX ADMIN — Medication 8: at 18:04

## 2023-12-18 RX ADMIN — PIPERACILLIN AND TAZOBACTAM 25 GRAM(S): 4; .5 INJECTION, POWDER, LYOPHILIZED, FOR SOLUTION INTRAVENOUS at 06:02

## 2023-12-18 RX ADMIN — Medication 40 MILLIGRAM(S): at 22:11

## 2023-12-18 RX ADMIN — ONDANSETRON 4 MILLIGRAM(S): 8 TABLET, FILM COATED ORAL at 12:48

## 2023-12-18 RX ADMIN — Medication 10: at 06:01

## 2023-12-18 RX ADMIN — OXYCODONE HYDROCHLORIDE 5 MILLIGRAM(S): 5 TABLET ORAL at 14:27

## 2023-12-18 RX ADMIN — OXYCODONE HYDROCHLORIDE 60 MILLIGRAM(S): 5 TABLET ORAL at 06:01

## 2023-12-18 RX ADMIN — AMLODIPINE BESYLATE 10 MILLIGRAM(S): 2.5 TABLET ORAL at 14:19

## 2023-12-18 NOTE — DIETITIAN INITIAL EVALUATION ADULT - ORAL INTAKE PTA/DIET HISTORY
Pt no responsive during interview asleep on Bipap. Pt chart shows RSV (12/16) Wt: 177.40kg and Ht: 165.1cm . Needs assistance when feeding  69 yo female with HTN, HLD, DM2, HFpEF, CKD III, DVT, Uterine CA, COPD on home o2 who presented with complaints of shortness of breath. Pt unarousable  during interview asleep on BiPAP. Pt chart shows RSV (12/16) Wt: 177.40kg and Ht: 165.1cm . Needs assistance when feeding, RN reports Patient NPO 2/2 continuous BiPAP. Rd to remain available.

## 2023-12-18 NOTE — PROGRESS NOTE ADULT - ASSESSMENT
69 yo female with HTN, HLD, DM2, HFpEF, CKD III, DVT, Uterine CA, COPD on home o2 who presented with complaints of shortness of breath. Patient reports that she has been sick all week and was recently started on Vantin and steroids while at the nursing home. Patient reports that her dyspnea just worsened and she told the nursing home to send her in for an evaluation. While in the ED, patient was placed on BIPAP and was given IV lasix with some improvement. RVP then results as RSV +. Patient had a CT scan which also showed pneumonia and pulmonary HTN. Admission to medicine was requested for further management.    #Acute on chronic respiratory failure/ likely multifactorial   #Acute HFpEF exacerbation  # likely with Superimposed bacterial Pneumonia in setting of RSV  #Pulmonary hypertension  Admit to SDU  ct noted with rt lung pna   BNP elevated  +RSV  c/w madeline Seay dc as legionella negative  will give iv steroids trial   f/u Blood cx, sputum cx  negative Urine legionella, urine strep, MRSA swab  c/w IV Lasix QD  f/u echo  Strict in and out  Cardiology consulted, plan for cath when respiratory status better  BIPAP- wean as tolerated   pulm consulted     #Chronic pain  continue home Pain meds of Oxycodone 60 mg q12h and Oxycodone 5mg q6h PRN    #hx of  suspicion for PE.DVT  Patient has empirically been treated for PE/DVT since August  No scans noted to have PE/DVT  duplex lower ext to without dvt   c/w ppx HSQ    #HLD  Atorvastatin for rosuvastatin    #COPD  Steroids as above  Symbicort, Spiriva, albuterol  pulm consulted     #DM  Will place on Lantus 65 + 18 u premeals  NPO for now  ISS  a1c 8.3    DVT prophylaxis: HSQ  Dispo: pending resp status optimization, weaning off BIPAP, glucose monitoring, TTE and eventual cath     67 yo female with HTN, HLD, DM2, HFpEF, CKD III, DVT, Uterine CA, COPD on home o2 who presented with complaints of shortness of breath. Patient reports that she has been sick all week and was recently started on Vantin and steroids while at the nursing home. Patient reports that her dyspnea just worsened and she told the nursing home to send her in for an evaluation. While in the ED, patient was placed on BIPAP and was given IV lasix with some improvement. RVP then results as RSV +. Patient had a CT scan which also showed pneumonia and pulmonary HTN. Admission to medicine was requested for further management.    #Acute on chronic respiratory failure/ likely multifactorial   #Acute HFpEF exacerbation  # likely with Superimposed bacterial Pneumonia in setting of RSV  #Pulmonary hypertension  Admit to SDU  ct noted with rt lung pna   BNP elevated  +RSV  c/w madeline Seay dc as legionella negative  will give iv steroids trial   f/u Blood cx, sputum cx  negative Urine legionella, urine strep, MRSA swab  c/w IV Lasix QD  f/u echo  Strict in and out  Cardiology consulted, plan for cath when respiratory status better  BIPAP- wean as tolerated   pulm consulted     #Chronic pain  continue home Pain meds of Oxycodone 60 mg q12h and Oxycodone 5mg q6h PRN    #hx of  suspicion for PE.DVT  Patient has empirically been treated for PE/DVT since August  No scans noted to have PE/DVT  duplex lower ext to without dvt   c/w ppx HSQ    #HLD  Atorvastatin for rosuvastatin    #COPD  Steroids as above  Symbicort, Spiriva, albuterol  pulm consulted     #DM  Will place on Lantus 65 + 18 u premeals  NPO for now  ISS  a1c 8.3    DVT prophylaxis: HSQ  Dispo: pending resp status optimization, weaning off BIPAP, glucose monitoring, TTE and eventual cath     Valtrex Counseling: I discussed with the patient the risks of valacyclovir including but not limited to kidney damage, nausea, vomiting and severe allergy.  The patient understands that if the infection seems to be worsening or is not improving, they are to call.

## 2023-12-18 NOTE — DIETITIAN INITIAL EVALUATION ADULT - PERTINENT LABORATORY DATA
12-18    138  |  91<L>  |  43.1<H>  ----------------------------<  356<H>  4.0   |  34.0<H>  |  1.44<H>    Ca    9.6      18 Dec 2023 05:58  Mg     2.0     12-18    TPro  7.1  /  Alb  3.9  /  TBili  0.8  /  DBili  x   /  AST  22  /  ALT  27  /  AlkPhos  109  12-18  POCT Blood Glucose.: 270 mg/dL (12-18-23 @ 12:16)  A1C with Estimated Average Glucose Result: 8.3 % (12-17-23 @ 08:20)  A1C with Estimated Average Glucose Result: 7.6 % (11-25-23 @ 09:00)  A1C with Estimated Average Glucose Result: 7.2 % (09-17-23 @ 16:13)

## 2023-12-18 NOTE — DIETITIAN INITIAL EVALUATION ADULT - PERTINENT MEDS FT
MEDICATIONS  (STANDING):  amLODIPine   Tablet 10 milliGRAM(s) Oral every 24 hours  atorvastatin 80 milliGRAM(s) Oral at bedtime  budesonide  80 MICROgram(s)/formoterol 4.5 MICROgram(s) Inhaler 2 Puff(s) Inhalation two times a day  dextrose 5%. 1000 milliLiter(s) (50 mL/Hr) IV Continuous <Continuous>  dextrose 5%. 1000 milliLiter(s) (100 mL/Hr) IV Continuous <Continuous>  dextrose 50% Injectable 12.5 Gram(s) IV Push once  dextrose 50% Injectable 25 Gram(s) IV Push once  dextrose 50% Injectable 25 Gram(s) IV Push once  furosemide   Injectable 40 milliGRAM(s) IV Push daily  glucagon  Injectable 1 milliGRAM(s) IntraMuscular once  heparin   Injectable 5000 Unit(s) SubCutaneous every 8 hours  insulin glargine Injectable (LANTUS) 65 Unit(s) SubCutaneous at bedtime  insulin lispro (ADMELOG) corrective regimen sliding scale   SubCutaneous every 6 hours  insulin lispro Injectable (ADMELOG) 18 Unit(s) SubCutaneous three times a day before meals  lactulose Syrup 20 Gram(s) Oral daily  methylPREDNISolone sodium succinate Injectable 40 milliGRAM(s) IV Push every 8 hours  oxyCODONE  ER Tablet 60 milliGRAM(s) Oral every 12 hours  piperacillin/tazobactam IVPB.- 3.375 Gram(s) IV Intermittent once  piperacillin/tazobactam IVPB.. 3.375 Gram(s) IV Intermittent every 8 hours  polyethylene glycol 3350 17 Gram(s) Oral daily  senna 2 Tablet(s) Oral at bedtime  tiotropium 2.5 MICROgram(s) Inhaler 2 Puff(s) Inhalation daily    MEDICATIONS  (PRN):  acetaminophen     Tablet .. 650 milliGRAM(s) Oral every 6 hours PRN Temp greater or equal to 38C (100.4F), Mild Pain (1 - 3)  albuterol    90 MICROgram(s) HFA Inhaler 2 Puff(s) Inhalation every 2 hours PRN Shortness of Breath and/or Wheezing  albuterol/ipratropium for Nebulization 3 milliLiter(s) Nebulizer every 6 hours PRN Bronchospasm  ALPRAZolam 0.25 milliGRAM(s) Oral at bedtime PRN for anxiety/Sleep  dextrose Oral Gel 15 Gram(s) Oral once PRN Blood Glucose LESS THAN 70 milliGRAM(s)/deciliter  diphenhydrAMINE 25 milliGRAM(s) Oral every 6 hours PRN Rash and/or Itching  hydrALAZINE Injectable 10 milliGRAM(s) IV Push every 4 hours PRN for systolic bp > 160  HYDROmorphone  Injectable 1 milliGRAM(s) IV Push every 4 hours PRN breakthrough  pain  melatonin 3 milliGRAM(s) Oral at bedtime PRN Insomnia  ondansetron Injectable 4 milliGRAM(s) IV Push every 8 hours PRN Nausea and/or Vomiting  oxyCODONE    IR 5 milliGRAM(s) Oral every 6 hours PRN Moderate Pain (4 - 6)

## 2023-12-18 NOTE — PROGRESS NOTE ADULT - NS ATTEND AMEND GEN_ALL_CORE FT
seen with above,    68F super morbid obesity (BMI >60), ENRIQUE/OHS, chronic respiratory failure, severe pulmonary HTN with RV dysfunction, CKD 3, HFpEF, recent admission 9/2023 with hypoxic respiratory failure unable to get CTPA or VQ scan and was empirically treated with Eliquis now readmitted for acute on chronic hypoxemic respiratory failure on treatment with IV diuresis and empiric Zosyn with Solumedrol  -remains on nasal BiPAP and dyspneic, not yet stable for R/LHC, will aim for later this week when respiratory status improves, need actual bed weight as cath lab table weight limits of 600 lbs.   -received IV Lasix yesterday 40mg x1, diuresed 4L, Cr. and bicarb stable, will continue IV Lasix 40mg once daily to keep at least 2-3 liters net negative daily, may need addition of acetazolamide if further uptrend in bicarb  -empiric Eliquis on hold, need DVT ppx with heparin subQ         Ji Jose Van DO, [facility]  Faculty Non-Invasive Cardiologist  [phone] seen with above,    68F super morbid obesity (BMI >60), ENRIQUE/OHS, chronic respiratory failure, severe pulmonary HTN with RV dysfunction, CKD 3, HFpEF, recent admission 9/2023 with hypoxic respiratory failure unable to get CTPA or VQ scan and was empirically treated with Eliquis now readmitted for acute on chronic hypoxemic respiratory failure on treatment with IV diuresis and empiric Zosyn with Solumedrol  -remains on nasal BiPAP and dyspneic, not yet stable for R/LHC, will aim for later this week when respiratory status improves, need actual bed weight as cath lab table weight limits of 600 lbs.   -received IV Lasix yesterday 40mg x1, diuresed 4L, Cr. and bicarb stable, will continue IV Lasix 40mg once daily to keep at least 2-3 liters net negative daily, may need addition of acetazolamide if further uptrend in bicarb  -empiric Eliquis on hold, need DVT ppx with heparin subQ         Ji Jose Van DO, [facility]  Faculty Non-Invasive Cardiologist  [phone] seen with above,    68F super morbid obesity (BMI >60), ENRIQUE/OHS, chronic respiratory failure, severe pulmonary HTN with RV dysfunction, CKD 3, HFpEF, recent admission 9/2023 with hypoxic respiratory failure unable to get CTPA or VQ scan and was empirically treated with Eliquis now readmitted for acute on chronic hypoxemic respiratory failure on treatment with IV diuresis and empiric Zosyn with Solumedrol, +RSV as well  -remains on nasal BiPAP and dyspneic, not yet stable for R/LHC, will aim for later this week when respiratory status improves, need actual bed weight as cath lab table weight limits of 600 lbs.   -received IV Lasix yesterday 40mg x1, diuresed 4L, Cr. and bicarb stable, will continue IV Lasix 40mg once daily to keep at least 2-3 liters net negative daily, may need addition of acetazolamide if further uptrend in bicarb  -empiric Eliquis on hold, need DVT ppx with heparin subQ         Ji Jose Van DO, [facility]  Faculty Non-Invasive Cardiologist  [phone] seen with above,    68F super morbid obesity (BMI >60), ENRIQUE/OHS, chronic respiratory failure, severe pulmonary HTN with RV dysfunction, CKD 3, HFpEF, recent admission 9/2023 with hypoxic respiratory failure unable to get CTPA or VQ scan and was empirically treated with Eliquis now readmitted for acute on chronic hypoxemic respiratory failure on treatment with IV diuresis and empiric Zosyn with Solumedrol, +RSV as well  -remains on nasal BiPAP and dyspneic, not yet stable for R/LHC, will aim for later this week when respiratory status improves, need actual bed weight as cath lab table weight limits of 600 lbs.   -received IV Lasix yesterday 40mg x1, diuresed 4L, Cr. and bicarb stable, will continue IV Lasix 40mg once daily to keep at least 2-3 liters net negative daily, may need addition of acetazolamide if further uptrend in bicarb  -empiric Eliquis on hold, need DVT ppx with heparin subQ         Ji Jose Van DO, [facility]  Faculty Non-Invasive Cardiologist  [phone]

## 2023-12-18 NOTE — PROGRESS NOTE ADULT - ASSESSMENT
69 y/o female with hx of HFpEF (EF 55-60% 8/2023), on home O2 4L, pulmonary HTN, On eliquis for  presumed PE and severe pulmonary hypertension (PASP of 70 mmHg).. morbid obesity, asthma, CKD3, IDDM2, uterine cancer s/p MEGAN, DVT LLE (2004), chronic leg edema 2/2 venous stasis, spinal stenosis with chronic back pain who presents with midsternal chest pressure and shortness of breath.   ( Ischemic testing has been deferred due to renal function and patients weight0  bnp 1650  trop 45 (flat curve)  Ekg no ischemic changes TTE is pending  CXR with chf    Currently on Bipap 12/18/23    Problem/Plan - 1:  ·  Acute on chronic heart failure with preserved ejection fraction (HFpEF).  acute resp failure requiring Bipap    ·repeat TTE pending   diuresed -4L in 24 hours  Hold lasix for now/ creat rising   hold ACE-I/ARB at this time given CKD and tentative R/LHC for next week  - pt needs further optimization   Eliquis on hold /  No proven PE was treated > 3 months   add norvasc for b/p control    Pneumonia / RSV   -contributing to resp status  Continue IVAB / steroids / nebs   d/w DR Van  69 y/o female with hx of HFpEF (EF 55-60% 8/2023), on home O2 4L, pulmonary HTN, On eliquis for  presumed PE and severe pulmonary hypertension (PASP of 70 mmHg).. morbid obesity, asthma, CKD3, IDDM2, uterine cancer s/p MEGAN, DVT LLE (2004), chronic leg edema 2/2 venous stasis, spinal stenosis with chronic back pain who presents with midsternal chest pressure and shortness of breath.   ( Ischemic testing has been deferred due to renal function and patients weight0  bnp 1650  trop 45 (flat curve)  Ekg no ischemic changes TTE is pending  CXR with chf    Currently on Bipap 12/18/23    Problem/Plan - 1:  ·  Acute on chronic heart failure with preserved ejection fraction (HFpEF).  acute resp failure requiring Bipap    ·repeat TTE pending   diuresed -4L in 24 hours  Hold lasix for now/ creat rising   hold ACE-I/ARB at this time given CKD and tentative R/LHC for next week  - pt needs further optimization   Eliquis on hold /  No proven PE was treated > 3 months   add norvasc for b/p control  Re-visit GDMT as clinical course progresses     Pneumonia / RSV   -contributing to resp status  Continue IVAB / steroids / nebs   d/w DR Van  67 y/o female with hx of HFpEF (EF 55-60% 8/2023), on home O2 4L, pulmonary HTN, On eliquis for  presumed PE and severe pulmonary hypertension (PASP of 70 mmHg).. morbid obesity, asthma, CKD3, IDDM2, uterine cancer s/p MEGAN, DVT LLE (2004), chronic leg edema 2/2 venous stasis, spinal stenosis with chronic back pain who presents with midsternal chest pressure and shortness of breath.   ( Ischemic testing has been deferred due to renal function and patients weight0  bnp 1650  trop 45 (flat curve)  Ekg no ischemic changes TTE is pending  CXR with chf    Currently on Bipap 12/18/23    Problem/Plan - 1:  ·  Acute on chronic heart failure with preserved ejection fraction (HFpEF).  acute resp failure requiring Bipap    ·repeat TTE pending   diuresed -4L in 24 hours  Hold lasix for now/ creat rising   hold ACE-I/ARB at this time given CKD and tentative R/LHC for next week  - pt needs further optimization   Eliquis on hold /  No proven PE was treated > 3 months   add norvasc for b/p control  Re-visit GDMT as clinical course progresses     Pneumonia / RSV   -contributing to resp status  Continue IVAB / steroids / nebs     Morbid obesity   BMI 65    d/w DR Van  69 y/o female with hx of HFpEF (EF 55-60% 8/2023), on home O2 4L, pulmonary HTN, On eliquis for  presumed PE and severe pulmonary hypertension (PASP of 70 mmHg).. morbid obesity, asthma, CKD3, IDDM2, uterine cancer s/p MEGAN, DVT LLE (2004), chronic leg edema 2/2 venous stasis, spinal stenosis with chronic back pain who presents with midsternal chest pressure and shortness of breath.   ( Ischemic testing has been deferred due to renal function and patients weight0  bnp 1650  trop 45 (flat curve)  Ekg no ischemic changes TTE is pending  CXR with chf    Currently on Bipap 12/18/23    Problem/Plan - 1:  ·  Acute on chronic heart failure with preserved ejection fraction (HFpEF).  acute resp failure requiring Bipap    ·repeat TTE pending   diuresed -4L in 24 hours  Continue IV Lasix 40mg once daily and monitor Cr.   hold ACE-I/ARB at this time given CKD and tentative R/LHC for next week  - pt needs further optimization   Eliquis on hold /  No proven PE was treated > 3 months   add norvasc for b/p control  Re-visit GDMT as clinical course progresses     Pneumonia / RSV   -contributing to resp status  Continue IVAB / steroids / nebs     Morbid obesity   BMI 65    d/w DR Van

## 2023-12-18 NOTE — PROGRESS NOTE ADULT - SUBJECTIVE AND OBJECTIVE BOX
Naima Burton M.D.    Patient is a 68y old  Female who presents with a chief complaint of Heart failure     (18 Dec 2023 13:20)      SUBJECTIVE / OVERNIGHT EVENTS: remains bipap dependent overnight. Otherwise still with SOB, mildly better.     Patient denies chest pain, abd pain, N/V, fever, chills, dysuria or any other complaints. All remainder ROS negative.     MEDICATIONS  (STANDING):  amLODIPine   Tablet 10 milliGRAM(s) Oral every 24 hours  atorvastatin 80 milliGRAM(s) Oral at bedtime  budesonide  80 MICROgram(s)/formoterol 4.5 MICROgram(s) Inhaler 2 Puff(s) Inhalation two times a day  dextrose 5%. 1000 milliLiter(s) (50 mL/Hr) IV Continuous <Continuous>  dextrose 5%. 1000 milliLiter(s) (100 mL/Hr) IV Continuous <Continuous>  dextrose 50% Injectable 12.5 Gram(s) IV Push once  dextrose 50% Injectable 25 Gram(s) IV Push once  dextrose 50% Injectable 25 Gram(s) IV Push once  furosemide   Injectable 40 milliGRAM(s) IV Push daily  glucagon  Injectable 1 milliGRAM(s) IntraMuscular once  heparin   Injectable 5000 Unit(s) SubCutaneous every 8 hours  insulin glargine Injectable (LANTUS) 65 Unit(s) SubCutaneous at bedtime  insulin lispro (ADMELOG) corrective regimen sliding scale   SubCutaneous every 6 hours  insulin lispro Injectable (ADMELOG) 18 Unit(s) SubCutaneous three times a day before meals  lactulose Syrup 20 Gram(s) Oral daily  methylPREDNISolone sodium succinate Injectable 40 milliGRAM(s) IV Push every 8 hours  oxyCODONE  ER Tablet 60 milliGRAM(s) Oral every 12 hours  piperacillin/tazobactam IVPB.- 3.375 Gram(s) IV Intermittent once  piperacillin/tazobactam IVPB.. 3.375 Gram(s) IV Intermittent every 8 hours  polyethylene glycol 3350 17 Gram(s) Oral daily  senna 2 Tablet(s) Oral at bedtime  tiotropium 2.5 MICROgram(s) Inhaler 2 Puff(s) Inhalation daily    MEDICATIONS  (PRN):  acetaminophen     Tablet .. 650 milliGRAM(s) Oral every 6 hours PRN Temp greater or equal to 38C (100.4F), Mild Pain (1 - 3)  albuterol    90 MICROgram(s) HFA Inhaler 2 Puff(s) Inhalation every 2 hours PRN Shortness of Breath and/or Wheezing  albuterol/ipratropium for Nebulization 3 milliLiter(s) Nebulizer every 6 hours PRN Bronchospasm  ALPRAZolam 0.25 milliGRAM(s) Oral at bedtime PRN for anxiety/Sleep  dextrose Oral Gel 15 Gram(s) Oral once PRN Blood Glucose LESS THAN 70 milliGRAM(s)/deciliter  diphenhydrAMINE 25 milliGRAM(s) Oral every 6 hours PRN Rash and/or Itching  hydrALAZINE Injectable 10 milliGRAM(s) IV Push every 4 hours PRN for systolic bp > 160  HYDROmorphone  Injectable 1 milliGRAM(s) IV Push every 4 hours PRN breakthrough  pain  melatonin 3 milliGRAM(s) Oral at bedtime PRN Insomnia  ondansetron Injectable 4 milliGRAM(s) IV Push every 8 hours PRN Nausea and/or Vomiting  oxyCODONE    IR 5 milliGRAM(s) Oral every 6 hours PRN Moderate Pain (4 - 6)      I&O's Summary    17 Dec 2023 07:01  -  18 Dec 2023 07:00  --------------------------------------------------------  IN: 300 mL / OUT: 4700 mL / NET: -4400 mL    18 Dec 2023 07:01  -  18 Dec 2023 13:47  --------------------------------------------------------  IN: 0 mL / OUT: 500 mL / NET: -500 mL        PHYSICAL EXAM:  Vital Signs Last 24 Hrs  T(C): 37 (18 Dec 2023 12:00), Max: 37.5 (18 Dec 2023 00:00)  T(F): 98.6 (18 Dec 2023 12:00), Max: 99.5 (18 Dec 2023 00:00)  HR: 85 (18 Dec 2023 12:05) (80 - 99)  BP: 114/61 (18 Dec 2023 12:00) (114/61 - 183/83)  BP(mean): 69 (18 Dec 2023 12:00) (69 - 105)  RR: 26 (18 Dec 2023 12:00) (22 - 30)  SpO2: 98% (18 Dec 2023 12:05) (94% - 100%)    Parameters below as of 18 Dec 2023 12:00  Patient On (Oxygen Delivery Method): BiPAP/CPAP    O2 Concentration (%): 40    CONSTITUTIONAL: NAD, on BIPAP  RESPIRATORY: Normal respiratory effort; diminished bs due to body habitus  CARDIOVASCULAR: Regular rate and rhythm, b/l venous stasis changes noted, LE edema   ABDOMEN: Nontender to palpation, normoactive bowel sounds  PSYCH: A+O x3; affect appropriate  NEUROLOGY: CN 2-12 are intact and symmetric; no gross sensory deficits;     LABS:                        13.2   10.30 )-----------( 231      ( 18 Dec 2023 05:58 )             41.2     12-18    138  |  91<L>  |  43.1<H>  ----------------------------<  356<H>  4.0   |  34.0<H>  |  1.44<H>    Ca    9.6      18 Dec 2023 05:58  Mg     2.0     12-18    TPro  7.1  /  Alb  3.9  /  TBili  0.8  /  DBili  x   /  AST  22  /  ALT  27  /  AlkPhos  109  12-18          Urinalysis Basic - ( 18 Dec 2023 05:58 )    Color: x / Appearance: x / SG: x / pH: x  Gluc: 356 mg/dL / Ketone: x  / Bili: x / Urobili: x   Blood: x / Protein: x / Nitrite: x   Leuk Esterase: x / RBC: x / WBC x   Sq Epi: x / Non Sq Epi: x / Bacteria: x        Culture - Blood (collected 16 Dec 2023 15:20)  Source: .Blood Blood  Preliminary Report (17 Dec 2023 22:04):    No growth at 24 hours    Culture - Blood (collected 16 Dec 2023 15:10)  Source: .Blood Blood  Preliminary Report (17 Dec 2023 22:04):    No growth at 24 hours    Culture - Urine (collected 16 Dec 2023 12:30)  Source: Clean Catch Clean Catch (Midstream)  Final Report (17 Dec 2023 19:16):    No growth      CAPILLARY BLOOD GLUCOSE      POCT Blood Glucose.: 270 mg/dL (18 Dec 2023 12:16)  POCT Blood Glucose.: 320 mg/dL (18 Dec 2023 08:33)  POCT Blood Glucose.: 358 mg/dL (18 Dec 2023 05:48)  POCT Blood Glucose.: 384 mg/dL (18 Dec 2023 02:12)  POCT Blood Glucose.: 365 mg/dL (17 Dec 2023 22:53)  POCT Blood Glucose.: 366 mg/dL (17 Dec 2023 18:15)  POCT Blood Glucose.: 439 mg/dL (17 Dec 2023 16:17)  POCT Blood Glucose.: 437 mg/dL (17 Dec 2023 15:44)  POCT Blood Glucose.: 465 mg/dL (17 Dec 2023 15:26)  POCT Blood Glucose.: 420 mg/dL (17 Dec 2023 15:07)      RADIOLOGY & ADDITIONAL TESTS:  Results Reviewed:   Imaging Personally Reviewed:  Electrocardiogram Personally Reviewed:

## 2023-12-18 NOTE — PROGRESS NOTE ADULT - SUBJECTIVE AND OBJECTIVE BOX
[facility]                                                         CARDIOLOGY AT [facility]                                                                  [address] [facility], [place] [place] [address]                                                         Telephone: [phone]. Fax:[phone]                                                                             PROGRESS NOTE    Reason for follow up: HFpEF 55-60%  Update:  remains on BIPAP  "feels oK" denies chest pain       Review of symptoms:   Cardiac:  No chest pain.+  dyspnea. No palpitations.  Respiratory: no cough. No dyspnea  Gastrointestinal: No diarrhea. No abdominal pain. No bleeding.   Neuro: No focal neuro complaints.    Vitals:  T(C): 37.3 (12-18-23 @ 04:00), Max: 37.5 (12-18-23 @ 00:00)  HR: 82 (12-18-23 @ 08:46) (80 - 99)  BP: 131/57 (12-18-23 @ 06:00) (130/75 - 183/83)  RR: 23 (12-18-23 @ 06:00) (22 - 30)  SpO2: 94% (12-18-23 @ 08:46) (92% - 100%)  Wt(kg): --  I&O's Summary    17 Dec 2023 07:01  -  18 Dec 2023 07:00  --------------------------------------------------------  IN: 300 mL / OUT: 4700 mL / NET: -4400 mL      Weight (kg): 177.4 (12-16 @ 10:02)    PHYSICAL EXAM:  Appearance: on bipap   HEENT:  Atraumatic. Normocephalic.  Normal oral mucosa  Neurologic: A & O x 3, no gross focal deficits.  Cardiovascular: RRR S1 S2, 86  No murmur, no rubs/gallops. No JVD  Respiratory: bibasilar crackles noted   Gastrointestinal:  OBESE Soft, Non-tender, + BS  Lower Extremities: 2+ Peripheral Pulses, No clubbing, cyanosis, 2+  edema bilaterally  Psychiatry: Patient is calm. No agitation.   Skin: warm and dry.    CURRENT CARDIAC MEDICATIONS:  amLODIPine   Tablet 10 milliGRAM(s) Oral every 24 hours  hydrALAZINE Injectable 10 milliGRAM(s) IV Push every 4 hours PRN      CURRENT OTHER MEDICATIONS:  albuterol    90 MICROgram(s) HFA Inhaler 2 Puff(s) Inhalation every 2 hours PRN Shortness of Breath and/or Wheezing  budesonide  80 MICROgram(s)/formoterol 4.5 MICROgram(s) Inhaler 2 Puff(s) Inhalation two times a day  tiotropium 2.5 MICROgram(s) Inhaler 2 Puff(s) Inhalation daily  piperacillin/tazobactam IVPB.- 3.375 Gram(s) IV Intermittent once  piperacillin/tazobactam IVPB.. 3.375 Gram(s) IV Intermittent every 8 hours  acetaminophen     Tablet .. 650 milliGRAM(s) Oral every 6 hours PRN Temp greater or equal to 38C (100.4F), Mild Pain (1 - 3)  ALPRAZolam 0.25 milliGRAM(s) Oral at bedtime PRN for anxiety/Sleep  diphenhydrAMINE 25 milliGRAM(s) Oral every 6 hours PRN Rash and/or Itching  HYDROmorphone  Injectable 1 milliGRAM(s) IV Push every 4 hours PRN breakthrough  pain  melatonin 3 milliGRAM(s) Oral at bedtime PRN Insomnia  ondansetron Injectable 4 milliGRAM(s) IV Push every 8 hours PRN Nausea and/or Vomiting  oxyCODONE    IR 5 milliGRAM(s) Oral every 6 hours PRN Moderate Pain (4 - 6)  oxyCODONE  ER Tablet 60 milliGRAM(s) Oral every 12 hours  lactulose Syrup 20 Gram(s) Oral daily  polyethylene glycol 3350 17 Gram(s) Oral daily  senna 2 Tablet(s) Oral at bedtime  atorvastatin 80 milliGRAM(s) Oral at bedtime  dextrose 50% Injectable 25 Gram(s) IV Push once, Stop order after: 1 Doses  dextrose 50% Injectable 25 Gram(s) IV Push once, Stop order after: 1 Doses  dextrose 50% Injectable 12.5 Gram(s) IV Push once, Stop order after: 1 Doses  dextrose Oral Gel 15 Gram(s) Oral once, Stop order after: 1 Doses PRN Blood Glucose LESS THAN 70 milliGRAM(s)/deciliter  glucagon  Injectable 1 milliGRAM(s) IntraMuscular once, Stop order after: 1 Doses  insulin glargine Injectable (LANTUS) 45 Unit(s) SubCutaneous at bedtime  insulin lispro (ADMELOG) corrective regimen sliding scale   SubCutaneous every 6 hours  insulin lispro Injectable (ADMELOG) 10 Unit(s) SubCutaneous three times a day before meals  methylPREDNISolone sodium succinate Injectable 40 milliGRAM(s) IV Push every 8 hours  dextrose 5%. 1000 milliLiter(s) (50 mL/Hr) IV Continuous <Continuous>  dextrose 5%. 1000 milliLiter(s) (100 mL/Hr) IV Continuous <Continuous>      LABS:	 	                            13.2   10.30 )-----------( 231      ( 18 Dec 2023 05:58 )             41.2     12-18    138  |  91<L>  |  43.1<H>  ----------------------------<  356<H>  4.0   |  34.0<H>  |  1.44<H>    Ca    9.6      18 Dec 2023 05:58  Mg     2.0     12-18    TPro  7.1  /  Alb  3.9  /  TBili  0.8  /  DBili  x   /  AST  22  /  ALT  27  /  AlkPhos  109  12-18    PT/INR/PTT ( 16 Dec 2023 10:18 )                       :                       :      14.6         :       31.7                  .        .                   .              .           .       1.33        .                                       Lipid Profile: Date: 12-17 @ 08:20  Total cholesterol 140; Direct LDL: --; HDL: 70; Triglycerides:133    HgA1c:   TSH: Thyroid Stimulating Hormone, Serum: 1.51 uIU/mL  Thyroid Stimulating Hormone, Serum: 3.02 uIU/mL      TELEMETRY: RSR 86 occ PVC / no events     DIAGNOSTIC TESTING:  < from: TTE Echo Complete w/ Contrast w/ Doppler (08.31.23 @ 08:19) >   1. Technically limited study.   2. Endocardial visualization was enhanced with intravenous echo contrast.   3. Left ventricular ejection fraction, by visual estimation, is 55 to   60%.   4. Grossly Normal global left ventricular systolic function.   5. Spectral Doppler shows pseudonormal pattern of left ventricular   myocardial filling (Grade II diastolic dysfunction).   6. Moderately enlarged right ventricle.   7. Moderately reduced RV systolic function on limited views.   8. The left atrium is normal in size.   9. Mild mitral annular calcification.  10. Mild thickening and calcification of the anterior and posterior   mitral valve leaflets.  11. Mild mitral valve regurgitation.  12. Moderate tricuspid regurgitation.  13. Estimated pulmonary artery systolic pressure is 70.9 mmHg assuming a   right atrial pressure of 5 mmHg, which is consistent with severe   pulmonary hypertension.  14. There is no evidence of pericardial effusion.                                                                [facility]                                                         CARDIOLOGY AT [facility]                                                                  [address] [facility], [place] [employer] [place] [address]                                                         Telephone: [phone]. Fax:[phone]                                                                             PROGRESS NOTE    Reason for follow up: HFpEF 55-60%  Update:  remains on BIPAP  "feels oK" denies chest pain       Review of symptoms:   Cardiac:  No chest pain.+  dyspnea. No palpitations.  Respiratory: no cough. No dyspnea  Gastrointestinal: No diarrhea. No abdominal pain. No bleeding.   Neuro: No focal neuro complaints.    Vitals:  T(C): 37.3 (12-18-23 @ 04:00), Max: 37.5 (12-18-23 @ 00:00)  HR: 82 (12-18-23 @ 08:46) (80 - 99)  BP: 131/57 (12-18-23 @ 06:00) (130/75 - 183/83)  RR: 23 (12-18-23 @ 06:00) (22 - 30)  SpO2: 94% (12-18-23 @ 08:46) (92% - 100%)  Wt(kg): --  I&O's Summary    17 Dec 2023 07:01  -  18 Dec 2023 07:00  --------------------------------------------------------  IN: 300 mL / OUT: 4700 mL / NET: -4400 mL      Weight (kg): 177.4 (12-16 @ 10:02)    PHYSICAL EXAM:  Appearance: on bipap   HEENT:  Atraumatic. Normocephalic.  Normal oral mucosa  Neurologic: A & O x 3, no gross focal deficits.  Cardiovascular: RRR S1 S2, 86  No murmur, no rubs/gallops. No JVD  Respiratory: bibasilar crackles noted   Gastrointestinal:  OBESE Soft, Non-tender, + BS  Lower Extremities: 2+ Peripheral Pulses, No clubbing, cyanosis, 2+  edema bilaterally  Psychiatry: Patient is calm. No agitation.   Skin: warm and dry.    CURRENT CARDIAC MEDICATIONS:  amLODIPine   Tablet 10 milliGRAM(s) Oral every 24 hours  hydrALAZINE Injectable 10 milliGRAM(s) IV Push every 4 hours PRN      CURRENT OTHER MEDICATIONS:  albuterol    90 MICROgram(s) HFA Inhaler 2 Puff(s) Inhalation every 2 hours PRN Shortness of Breath and/or Wheezing  budesonide  80 MICROgram(s)/formoterol 4.5 MICROgram(s) Inhaler 2 Puff(s) Inhalation two times a day  tiotropium 2.5 MICROgram(s) Inhaler 2 Puff(s) Inhalation daily  piperacillin/tazobactam IVPB.- 3.375 Gram(s) IV Intermittent once  piperacillin/tazobactam IVPB.. 3.375 Gram(s) IV Intermittent every 8 hours  acetaminophen     Tablet .. 650 milliGRAM(s) Oral every 6 hours PRN Temp greater or equal to 38C (100.4F), Mild Pain (1 - 3)  ALPRAZolam 0.25 milliGRAM(s) Oral at bedtime PRN for anxiety/Sleep  diphenhydrAMINE 25 milliGRAM(s) Oral every 6 hours PRN Rash and/or Itching  HYDROmorphone  Injectable 1 milliGRAM(s) IV Push every 4 hours PRN breakthrough  pain  melatonin 3 milliGRAM(s) Oral at bedtime PRN Insomnia  ondansetron Injectable 4 milliGRAM(s) IV Push every 8 hours PRN Nausea and/or Vomiting  oxyCODONE    IR 5 milliGRAM(s) Oral every 6 hours PRN Moderate Pain (4 - 6)  oxyCODONE  ER Tablet 60 milliGRAM(s) Oral every 12 hours  lactulose Syrup 20 Gram(s) Oral daily  polyethylene glycol 3350 17 Gram(s) Oral daily  senna 2 Tablet(s) Oral at bedtime  atorvastatin 80 milliGRAM(s) Oral at bedtime  dextrose 50% Injectable 25 Gram(s) IV Push once, Stop order after: 1 Doses  dextrose 50% Injectable 25 Gram(s) IV Push once, Stop order after: 1 Doses  dextrose 50% Injectable 12.5 Gram(s) IV Push once, Stop order after: 1 Doses  dextrose Oral Gel 15 Gram(s) Oral once, Stop order after: 1 Doses PRN Blood Glucose LESS THAN 70 milliGRAM(s)/deciliter  glucagon  Injectable 1 milliGRAM(s) IntraMuscular once, Stop order after: 1 Doses  insulin glargine Injectable (LANTUS) 45 Unit(s) SubCutaneous at bedtime  insulin lispro (ADMELOG) corrective regimen sliding scale   SubCutaneous every 6 hours  insulin lispro Injectable (ADMELOG) 10 Unit(s) SubCutaneous three times a day before meals  methylPREDNISolone sodium succinate Injectable 40 milliGRAM(s) IV Push every 8 hours  dextrose 5%. 1000 milliLiter(s) (50 mL/Hr) IV Continuous <Continuous>  dextrose 5%. 1000 milliLiter(s) (100 mL/Hr) IV Continuous <Continuous>      LABS:	 	                            13.2   10.30 )-----------( 231      ( 18 Dec 2023 05:58 )             41.2     12-18    138  |  91<L>  |  43.1<H>  ----------------------------<  356<H>  4.0   |  34.0<H>  |  1.44<H>    Ca    9.6      18 Dec 2023 05:58  Mg     2.0     12-18    TPro  7.1  /  Alb  3.9  /  TBili  0.8  /  DBili  x   /  AST  22  /  ALT  27  /  AlkPhos  109  12-18    PT/INR/PTT ( 16 Dec 2023 10:18 )                       :                       :      14.6         :       31.7                  .        .                   .              .           .       1.33        .                                       Lipid Profile: Date: 12-17 @ 08:20  Total cholesterol 140; Direct LDL: --; HDL: 70; Triglycerides:133    HgA1c:   TSH: Thyroid Stimulating Hormone, Serum: 1.51 uIU/mL  Thyroid Stimulating Hormone, Serum: 3.02 uIU/mL      TELEMETRY: RSR 86 occ PVC / no events     DIAGNOSTIC TESTING:  < from: TTE Echo Complete w/ Contrast w/ Doppler (08.31.23 @ 08:19) >   1. Technically limited study.   2. Endocardial visualization was enhanced with intravenous echo contrast.   3. Left ventricular ejection fraction, by visual estimation, is 55 to   60%.   4. Grossly Normal global left ventricular systolic function.   5. Spectral Doppler shows pseudonormal pattern of left ventricular   myocardial filling (Grade II diastolic dysfunction).   6. Moderately enlarged right ventricle.   7. Moderately reduced RV systolic function on limited views.   8. The left atrium is normal in size.   9. Mild mitral annular calcification.  10. Mild thickening and calcification of the anterior and posterior   mitral valve leaflets.  11. Mild mitral valve regurgitation.  12. Moderate tricuspid regurgitation.  13. Estimated pulmonary artery systolic pressure is 70.9 mmHg assuming a   right atrial pressure of 5 mmHg, which is consistent with severe   pulmonary hypertension.  14. There is no evidence of pericardial effusion.

## 2023-12-18 NOTE — DIETITIAN INITIAL EVALUATION ADULT - OTHER INFO
67 yo female with HTN, HLD, DM2, HFpEF, CKD III, DVT, Uterine CA, COPD on home o2 who presented with complaints of shortness of breath. Patient reports that she has been sick all week and was recently started on Vantin and steroids while at the nursing home. Patient reports that her dyspnea just worsened and she told the nursing home to send her in for an evaluation. While in the ED, patient was placed on BIPAP and was given IV lasix with some improvement. RVP then results as RSV +. Patient had a CT scan which also showed pneumonia and pulmonary HTN. Admission to medicine was requested for further management. 69 yo female with HTN, HLD, DM2, HFpEF, CKD III, DVT, Uterine CA, COPD on home o2 who presented with complaints of shortness of breath. Patient reports that she has been sick all week and was recently started on Vantin and steroids while at the nursing home. Patient reports that her dyspnea just worsened and she told the nursing home to send her in for an evaluation. While in the ED, patient was placed on BIPAP and was given IV lasix with some improvement. RVP then results as RSV +. Patient had a CT scan which also showed pneumonia and pulmonary HTN. Admission to medicine was requested for further management.

## 2023-12-18 NOTE — DIETITIAN INITIAL EVALUATION ADULT - ETIOLOGY
Related to increased needs in the setting of , RSV  Related to respiratory insufficiency in the setting of COPD, RSV and pneumonia

## 2023-12-18 NOTE — DIETITIAN INITIAL EVALUATION ADULT - ENTERAL
Consider alternate means of nutrition if needs aren't being met  Consider alternate means if unable to feed PO

## 2023-12-18 NOTE — DIETITIAN INITIAL EVALUATION ADULT - NUTRITIONGOAL OUTCOME1
Pt will meet >75% of estimated protein-energy needs via tolerated route    Provide adequate nutrition via tolerated route

## 2023-12-19 LAB
ANION GAP SERPL CALC-SCNC: 11 MMOL/L — SIGNIFICANT CHANGE UP (ref 5–17)
ANION GAP SERPL CALC-SCNC: 11 MMOL/L — SIGNIFICANT CHANGE UP (ref 5–17)
BASE EXCESS BLDA CALC-SCNC: 19.6 MMOL/L — HIGH (ref -2–3)
BASE EXCESS BLDA CALC-SCNC: 19.6 MMOL/L — HIGH (ref -2–3)
BLOOD GAS COMMENTS ARTERIAL: SIGNIFICANT CHANGE UP
BLOOD GAS COMMENTS ARTERIAL: SIGNIFICANT CHANGE UP
BUN SERPL-MCNC: 51.3 MG/DL — HIGH (ref 8–20)
BUN SERPL-MCNC: 51.3 MG/DL — HIGH (ref 8–20)
CALCIUM SERPL-MCNC: 9.5 MG/DL — SIGNIFICANT CHANGE UP (ref 8.4–10.5)
CALCIUM SERPL-MCNC: 9.5 MG/DL — SIGNIFICANT CHANGE UP (ref 8.4–10.5)
CHLORIDE SERPL-SCNC: 93 MMOL/L — LOW (ref 96–108)
CHLORIDE SERPL-SCNC: 93 MMOL/L — LOW (ref 96–108)
CO2 SERPL-SCNC: 34 MMOL/L — HIGH (ref 22–29)
CO2 SERPL-SCNC: 34 MMOL/L — HIGH (ref 22–29)
CREAT SERPL-MCNC: 1.56 MG/DL — HIGH (ref 0.5–1.3)
CREAT SERPL-MCNC: 1.56 MG/DL — HIGH (ref 0.5–1.3)
EGFR: 36 ML/MIN/1.73M2 — LOW
EGFR: 36 ML/MIN/1.73M2 — LOW
GAS PNL BLDA: SIGNIFICANT CHANGE UP
GAS PNL BLDA: SIGNIFICANT CHANGE UP
GLUCOSE BLDC GLUCOMTR-MCNC: 279 MG/DL — HIGH (ref 70–99)
GLUCOSE BLDC GLUCOMTR-MCNC: 279 MG/DL — HIGH (ref 70–99)
GLUCOSE BLDC GLUCOMTR-MCNC: 291 MG/DL — HIGH (ref 70–99)
GLUCOSE BLDC GLUCOMTR-MCNC: 291 MG/DL — HIGH (ref 70–99)
GLUCOSE BLDC GLUCOMTR-MCNC: 338 MG/DL — HIGH (ref 70–99)
GLUCOSE BLDC GLUCOMTR-MCNC: 338 MG/DL — HIGH (ref 70–99)
GLUCOSE BLDC GLUCOMTR-MCNC: 363 MG/DL — HIGH (ref 70–99)
GLUCOSE BLDC GLUCOMTR-MCNC: 363 MG/DL — HIGH (ref 70–99)
GLUCOSE BLDC GLUCOMTR-MCNC: 383 MG/DL — HIGH (ref 70–99)
GLUCOSE BLDC GLUCOMTR-MCNC: 383 MG/DL — HIGH (ref 70–99)
GLUCOSE BLDC GLUCOMTR-MCNC: 434 MG/DL — HIGH (ref 70–99)
GLUCOSE BLDC GLUCOMTR-MCNC: 434 MG/DL — HIGH (ref 70–99)
GLUCOSE SERPL-MCNC: 308 MG/DL — HIGH (ref 70–99)
GLUCOSE SERPL-MCNC: 308 MG/DL — HIGH (ref 70–99)
HCO3 BLDA-SCNC: 44 MMOL/L — HIGH (ref 21–28)
HCO3 BLDA-SCNC: 44 MMOL/L — HIGH (ref 21–28)
HCT VFR BLD CALC: 43.3 % — SIGNIFICANT CHANGE UP (ref 34.5–45)
HCT VFR BLD CALC: 43.3 % — SIGNIFICANT CHANGE UP (ref 34.5–45)
HGB BLD-MCNC: 14.2 G/DL — SIGNIFICANT CHANGE UP (ref 11.5–15.5)
HGB BLD-MCNC: 14.2 G/DL — SIGNIFICANT CHANGE UP (ref 11.5–15.5)
HOROWITZ INDEX BLDA+IHG-RTO: SIGNIFICANT CHANGE UP
HOROWITZ INDEX BLDA+IHG-RTO: SIGNIFICANT CHANGE UP
MAGNESIUM SERPL-MCNC: 2.2 MG/DL — SIGNIFICANT CHANGE UP (ref 1.6–2.6)
MAGNESIUM SERPL-MCNC: 2.2 MG/DL — SIGNIFICANT CHANGE UP (ref 1.6–2.6)
MCHC RBC-ENTMCNC: 30.1 PG — SIGNIFICANT CHANGE UP (ref 27–34)
MCHC RBC-ENTMCNC: 30.1 PG — SIGNIFICANT CHANGE UP (ref 27–34)
MCHC RBC-ENTMCNC: 32.8 GM/DL — SIGNIFICANT CHANGE UP (ref 32–36)
MCHC RBC-ENTMCNC: 32.8 GM/DL — SIGNIFICANT CHANGE UP (ref 32–36)
MCV RBC AUTO: 91.9 FL — SIGNIFICANT CHANGE UP (ref 80–100)
MCV RBC AUTO: 91.9 FL — SIGNIFICANT CHANGE UP (ref 80–100)
PCO2 BLDA: 68 MMHG — HIGH (ref 32–45)
PCO2 BLDA: 68 MMHG — HIGH (ref 32–45)
PH BLDA: 7.42 — SIGNIFICANT CHANGE UP (ref 7.35–7.45)
PH BLDA: 7.42 — SIGNIFICANT CHANGE UP (ref 7.35–7.45)
PLATELET # BLD AUTO: 214 K/UL — SIGNIFICANT CHANGE UP (ref 150–400)
PLATELET # BLD AUTO: 214 K/UL — SIGNIFICANT CHANGE UP (ref 150–400)
PO2 BLDA: 118 MMHG — HIGH (ref 83–108)
PO2 BLDA: 118 MMHG — HIGH (ref 83–108)
POTASSIUM SERPL-MCNC: 4.4 MMOL/L — SIGNIFICANT CHANGE UP (ref 3.5–5.3)
POTASSIUM SERPL-MCNC: 4.4 MMOL/L — SIGNIFICANT CHANGE UP (ref 3.5–5.3)
POTASSIUM SERPL-SCNC: 4.4 MMOL/L — SIGNIFICANT CHANGE UP (ref 3.5–5.3)
POTASSIUM SERPL-SCNC: 4.4 MMOL/L — SIGNIFICANT CHANGE UP (ref 3.5–5.3)
RBC # BLD: 4.71 M/UL — SIGNIFICANT CHANGE UP (ref 3.8–5.2)
RBC # BLD: 4.71 M/UL — SIGNIFICANT CHANGE UP (ref 3.8–5.2)
RBC # FLD: 13.8 % — SIGNIFICANT CHANGE UP (ref 10.3–14.5)
RBC # FLD: 13.8 % — SIGNIFICANT CHANGE UP (ref 10.3–14.5)
SAO2 % BLDA: 98.9 % — HIGH (ref 94–98)
SAO2 % BLDA: 98.9 % — HIGH (ref 94–98)
SODIUM SERPL-SCNC: 138 MMOL/L — SIGNIFICANT CHANGE UP (ref 135–145)
SODIUM SERPL-SCNC: 138 MMOL/L — SIGNIFICANT CHANGE UP (ref 135–145)
WBC # BLD: 9.18 K/UL — SIGNIFICANT CHANGE UP (ref 3.8–10.5)
WBC # BLD: 9.18 K/UL — SIGNIFICANT CHANGE UP (ref 3.8–10.5)
WBC # FLD AUTO: 9.18 K/UL — SIGNIFICANT CHANGE UP (ref 3.8–10.5)
WBC # FLD AUTO: 9.18 K/UL — SIGNIFICANT CHANGE UP (ref 3.8–10.5)

## 2023-12-19 PROCEDURE — 76942 ECHO GUIDE FOR BIOPSY: CPT | Mod: 26,59

## 2023-12-19 PROCEDURE — 36556 INSERT NON-TUNNEL CV CATH: CPT

## 2023-12-19 PROCEDURE — 99221 1ST HOSP IP/OBS SF/LOW 40: CPT

## 2023-12-19 PROCEDURE — 93010 ELECTROCARDIOGRAM REPORT: CPT

## 2023-12-19 PROCEDURE — 76937 US GUIDE VASCULAR ACCESS: CPT | Mod: 26

## 2023-12-19 PROCEDURE — 99233 SBSQ HOSP IP/OBS HIGH 50: CPT

## 2023-12-19 PROCEDURE — 99234 HOSP IP/OBS SM DT SF/LOW 45: CPT

## 2023-12-19 RX ORDER — INSULIN GLARGINE 100 [IU]/ML
80 INJECTION, SOLUTION SUBCUTANEOUS AT BEDTIME
Refills: 0 | Status: DISCONTINUED | OUTPATIENT
Start: 2023-12-19 | End: 2023-12-21

## 2023-12-19 RX ORDER — FUROSEMIDE 40 MG
40 TABLET ORAL ONCE
Refills: 0 | Status: COMPLETED | OUTPATIENT
Start: 2023-12-19 | End: 2023-12-19

## 2023-12-19 RX ORDER — HYDROMORPHONE HYDROCHLORIDE 2 MG/ML
0.5 INJECTION INTRAMUSCULAR; INTRAVENOUS; SUBCUTANEOUS EVERY 6 HOURS
Refills: 0 | Status: DISCONTINUED | OUTPATIENT
Start: 2023-12-19 | End: 2023-12-19

## 2023-12-19 RX ORDER — INSULIN LISPRO 100/ML
25 VIAL (ML) SUBCUTANEOUS
Refills: 0 | Status: DISCONTINUED | OUTPATIENT
Start: 2023-12-19 | End: 2023-12-20

## 2023-12-19 RX ADMIN — PIPERACILLIN AND TAZOBACTAM 25 GRAM(S): 4; .5 INJECTION, POWDER, LYOPHILIZED, FOR SOLUTION INTRAVENOUS at 13:11

## 2023-12-19 RX ADMIN — SENNA PLUS 2 TABLET(S): 8.6 TABLET ORAL at 23:03

## 2023-12-19 RX ADMIN — OXYCODONE HYDROCHLORIDE 60 MILLIGRAM(S): 5 TABLET ORAL at 07:06

## 2023-12-19 RX ADMIN — Medication 12: at 23:03

## 2023-12-19 RX ADMIN — Medication 6: at 07:06

## 2023-12-19 RX ADMIN — Medication 40 MILLIGRAM(S): at 13:11

## 2023-12-19 RX ADMIN — Medication 3 MILLILITER(S): at 16:43

## 2023-12-19 RX ADMIN — PIPERACILLIN AND TAZOBACTAM 25 GRAM(S): 4; .5 INJECTION, POWDER, LYOPHILIZED, FOR SOLUTION INTRAVENOUS at 23:02

## 2023-12-19 RX ADMIN — Medication 8: at 02:12

## 2023-12-19 RX ADMIN — ATORVASTATIN CALCIUM 80 MILLIGRAM(S): 80 TABLET, FILM COATED ORAL at 23:03

## 2023-12-19 RX ADMIN — HEPARIN SODIUM 5000 UNIT(S): 5000 INJECTION INTRAVENOUS; SUBCUTANEOUS at 23:02

## 2023-12-19 RX ADMIN — Medication 40 MILLIGRAM(S): at 07:00

## 2023-12-19 RX ADMIN — Medication 0.25 MILLIGRAM(S): at 23:02

## 2023-12-19 RX ADMIN — Medication 40 MILLIGRAM(S): at 23:03

## 2023-12-19 RX ADMIN — OXYCODONE HYDROCHLORIDE 60 MILLIGRAM(S): 5 TABLET ORAL at 18:12

## 2023-12-19 RX ADMIN — HEPARIN SODIUM 5000 UNIT(S): 5000 INJECTION INTRAVENOUS; SUBCUTANEOUS at 07:00

## 2023-12-19 RX ADMIN — HEPARIN SODIUM 5000 UNIT(S): 5000 INJECTION INTRAVENOUS; SUBCUTANEOUS at 13:11

## 2023-12-19 RX ADMIN — PIPERACILLIN AND TAZOBACTAM 25 GRAM(S): 4; .5 INJECTION, POWDER, LYOPHILIZED, FOR SOLUTION INTRAVENOUS at 07:04

## 2023-12-19 RX ADMIN — Medication 40 MILLIGRAM(S): at 07:01

## 2023-12-19 RX ADMIN — INSULIN GLARGINE 80 UNIT(S): 100 INJECTION, SOLUTION SUBCUTANEOUS at 23:02

## 2023-12-19 RX ADMIN — OXYCODONE HYDROCHLORIDE 60 MILLIGRAM(S): 5 TABLET ORAL at 18:42

## 2023-12-19 RX ADMIN — BUDESONIDE AND FORMOTEROL FUMARATE DIHYDRATE 2 PUFF(S): 160; 4.5 AEROSOL RESPIRATORY (INHALATION) at 20:23

## 2023-12-19 RX ADMIN — Medication 0.25 MILLIGRAM(S): at 02:12

## 2023-12-19 RX ADMIN — Medication 10: at 13:11

## 2023-12-19 RX ADMIN — Medication 40 MILLIGRAM(S): at 13:32

## 2023-12-19 RX ADMIN — Medication 10: at 18:11

## 2023-12-19 NOTE — PROGRESS NOTE ADULT - PROBLEM SELECTOR PLAN 1
b/p on low side today  d/c Norvasc  bun/creat mildly increased  recheck in am   continue bi pap for resp insuff  will plan for right and left heart cath when off bipap and resp status is stable  Was on Eliquis for presumed PE never proven due to testing limitation  Eliquis d/dioni

## 2023-12-19 NOTE — PROGRESS NOTE ADULT - NUTRITIONAL ASSESSMENT
This patient has been assessed with a concern for Malnutrition and has been determined to have a diagnosis/diagnoses of Morbid obesity (BMI > 40).    This patient is being managed with:   Diet NPO-  Except Medications  Entered: Dec 18 2023  9:00PM   This patient has been assessed with a concern for Malnutrition and has been determined to have a diagnosis/diagnoses of Morbid obesity (BMI > 40).    This patient is being managed with:   Diet NPO-  Except Medications  Entered: Dec 18 2023  9:00PM

## 2023-12-19 NOTE — PROGRESS NOTE ADULT - ASSESSMENT
Acute on chronic Hypercapnic hypoxemic resp failure  COPD,RSV, HFpEF, Morbid obesity, chronic pain on narcotics  New Consolidation RLL, ABG compensated  Continue abx, nebs, medrol, gentle diuresis, on full AC  Trial of NIV, HFNC- oxygenating well on 40%   continue NIV nocturnal and prn  HOB elevated  prognosis guarded

## 2023-12-19 NOTE — PROCEDURE NOTE - NSTIMEOUT_GEN_A_CORE
Patient's first and last name, , procedure, and correct site confirmed prior to the start of procedure.
no fever and no chills.

## 2023-12-19 NOTE — PROGRESS NOTE ADULT - ASSESSMENT
67 y/o female with hx of HFpEF (EF 55-60% 8/2023), on home O2 4L, pulmonary HTN, On eliquis for  presumed PE and severe pulmonary hypertension (PASP of 70 mmHg).. morbid obesity, asthma, CKD3, IDDM2, uterine cancer s/p MEGAN, DVT LLE (2004), chronic leg edema 2/2 venous stasis, spinal stenosis with chronic back pain who presents with midsternal chest pressure and shortness of breath.   ( Ischemic testing has been deferred due to renal function and patients weight0  bnp 1650  trop 45 (flat curve)  Ekg no ischemic changes TTE is pending  CXR with chf     69 y/o female with hx of HFpEF (EF 55-60% 8/2023), on home O2 4L, pulmonary HTN, On eliquis for  presumed PE and severe pulmonary hypertension (PASP of 70 mmHg).. morbid obesity, asthma, CKD3, IDDM2, uterine cancer s/p MEGAN, DVT LLE (2004), chronic leg edema 2/2 venous stasis, spinal stenosis with chronic back pain who presents with midsternal chest pressure and shortness of breath.  ( Ischemic testing has been deferred due to renal function and patients weight)  bnp 1650  trop 45 (flat curve)  Ekg no ischemic changes TTE EF 60% grade 2 dd moderated reduced Rv systolic function rsvp 70mmhg  CXR with chf

## 2023-12-19 NOTE — PROGRESS NOTE ADULT - SUBJECTIVE AND OBJECTIVE BOX
Naima Burton M.D.    Patient is a 68y old  Female who presents with a chief complaint of SOB (19 Dec 2023 08:36)      SUBJECTIVE / OVERNIGHT EVENTS: remains BIPAP dependent.     Patient denies chest pain, SOB, abd pain, N/V, fever, chills, dysuria or any other complaints. All remainder ROS negative.     MEDICATIONS  (STANDING):  atorvastatin 80 milliGRAM(s) Oral at bedtime  budesonide  80 MICROgram(s)/formoterol 4.5 MICROgram(s) Inhaler 2 Puff(s) Inhalation two times a day  dextrose 5%. 1000 milliLiter(s) (50 mL/Hr) IV Continuous <Continuous>  dextrose 5%. 1000 milliLiter(s) (100 mL/Hr) IV Continuous <Continuous>  dextrose 50% Injectable 12.5 Gram(s) IV Push once  dextrose 50% Injectable 25 Gram(s) IV Push once  dextrose 50% Injectable 25 Gram(s) IV Push once  furosemide   Injectable 40 milliGRAM(s) IV Push daily  glucagon  Injectable 1 milliGRAM(s) IntraMuscular once  heparin   Injectable 5000 Unit(s) SubCutaneous every 8 hours  insulin glargine Injectable (LANTUS) 80 Unit(s) SubCutaneous at bedtime  insulin lispro (ADMELOG) corrective regimen sliding scale   SubCutaneous every 6 hours  insulin lispro Injectable (ADMELOG) 18 Unit(s) SubCutaneous three times a day before meals  lactulose Syrup 20 Gram(s) Oral daily  methylPREDNISolone sodium succinate Injectable 40 milliGRAM(s) IV Push every 8 hours  oxyCODONE  ER Tablet 60 milliGRAM(s) Oral every 12 hours  piperacillin/tazobactam IVPB.- 3.375 Gram(s) IV Intermittent once  piperacillin/tazobactam IVPB.. 3.375 Gram(s) IV Intermittent every 8 hours  polyethylene glycol 3350 17 Gram(s) Oral daily  senna 2 Tablet(s) Oral at bedtime  tiotropium 2.5 MICROgram(s) Inhaler 2 Puff(s) Inhalation daily    MEDICATIONS  (PRN):  acetaminophen     Tablet .. 650 milliGRAM(s) Oral every 6 hours PRN Temp greater or equal to 38C (100.4F), Mild Pain (1 - 3)  albuterol    90 MICROgram(s) HFA Inhaler 2 Puff(s) Inhalation every 2 hours PRN Shortness of Breath and/or Wheezing  albuterol/ipratropium for Nebulization 3 milliLiter(s) Nebulizer every 6 hours PRN Bronchospasm  ALPRAZolam 0.25 milliGRAM(s) Oral at bedtime PRN for anxiety/Sleep  dextrose Oral Gel 15 Gram(s) Oral once PRN Blood Glucose LESS THAN 70 milliGRAM(s)/deciliter  diphenhydrAMINE 25 milliGRAM(s) Oral every 6 hours PRN Rash and/or Itching  hydrALAZINE Injectable 10 milliGRAM(s) IV Push every 4 hours PRN for systolic bp > 160  HYDROmorphone  Injectable 1 milliGRAM(s) IV Push every 4 hours PRN breakthrough  pain  melatonin 3 milliGRAM(s) Oral at bedtime PRN Insomnia  ondansetron Injectable 4 milliGRAM(s) IV Push every 8 hours PRN Nausea and/or Vomiting  oxyCODONE    IR 5 milliGRAM(s) Oral every 6 hours PRN Moderate Pain (4 - 6)      I&O's Summary    18 Dec 2023 07:01  -  19 Dec 2023 07:00  --------------------------------------------------------  IN: 250 mL / OUT: 1875 mL / NET: -1625 mL        PHYSICAL EXAM:  Vital Signs Last 24 Hrs  T(C): 36.6 (19 Dec 2023 08:34), Max: 37.1 (18 Dec 2023 15:41)  T(F): 97.9 (19 Dec 2023 08:34), Max: 98.8 (18 Dec 2023 15:41)  HR: 76 (19 Dec 2023 09:24) (67 - 86)  BP: 113/58 (19 Dec 2023 08:00) (91/51 - 130/53)  BP(mean): 72 (19 Dec 2023 08:00) (61 - 72)  RR: 18 (19 Dec 2023 08:00) (18 - 24)  SpO2: 100% (19 Dec 2023 09:24) (95% - 100%)    Parameters below as of 19 Dec 2023 08:00  Patient On (Oxygen Delivery Method): BiPAP/CPAP    O2 Concentration (%): 40    CONSTITUTIONAL: NAD, on BIPAP  RESPIRATORY: Normal respiratory effort; diminished bs due to body habitus  CARDIOVASCULAR: Regular rate and rhythm, b/l venous stasis changes noted, LE edema   ABDOMEN: Nontender to palpation, normoactive bowel sounds  PSYCH: A+O x3; affect appropriate  NEUROLOGY: CN 2-12 are intact and symmetric; no gross sensory deficits;     LABS:                        14.2   9.18  )-----------( 214      ( 19 Dec 2023 06:20 )             43.3     12-19    138  |  93<L>  |  51.3<H>  ----------------------------<  308<H>  4.4   |  34.0<H>  |  1.56<H>    Ca    9.5      19 Dec 2023 06:20  Mg     2.2     12-19    TPro  7.1  /  Alb  3.9  /  TBili  0.8  /  DBili  x   /  AST  22  /  ALT  27  /  AlkPhos  109  12-18          Urinalysis Basic - ( 19 Dec 2023 06:20 )    Color: x / Appearance: x / SG: x / pH: x  Gluc: 308 mg/dL / Ketone: x  / Bili: x / Urobili: x   Blood: x / Protein: x / Nitrite: x   Leuk Esterase: x / RBC: x / WBC x   Sq Epi: x / Non Sq Epi: x / Bacteria: x        Culture - Blood (collected 16 Dec 2023 15:20)  Source: .Blood Blood  Preliminary Report (18 Dec 2023 22:05):    No growth at 48 Hours    Culture - Blood (collected 16 Dec 2023 15:10)  Source: .Blood Blood  Preliminary Report (18 Dec 2023 22:05):    No growth at 48 Hours    Culture - Urine (collected 16 Dec 2023 12:30)  Source: Clean Catch Clean Catch (Midstream)  Final Report (17 Dec 2023 19:16):    No growth      CAPILLARY BLOOD GLUCOSE      POCT Blood Glucose.: 291 mg/dL (19 Dec 2023 09:02)  POCT Blood Glucose.: 279 mg/dL (19 Dec 2023 07:02)  POCT Blood Glucose.: 338 mg/dL (19 Dec 2023 02:11)  POCT Blood Glucose.: 307 mg/dL (18 Dec 2023 22:09)  POCT Blood Glucose.: 326 mg/dL (18 Dec 2023 18:03)  POCT Blood Glucose.: 321 mg/dL (18 Dec 2023 16:09)      RADIOLOGY & ADDITIONAL TESTS:  Results Reviewed:   Imaging Personally Reviewed:  Electrocardiogram Personally Reviewed:

## 2023-12-19 NOTE — PROGRESS NOTE ADULT - NUTRITIONAL ASSESSMENT
This patient has been assessed with a concern for Malnutrition and has been determined to have a diagnosis/diagnoses of Morbid obesity (BMI > 40).    This patient is being managed with:   Diet NPO-  Except Medications  Entered: Dec 18 2023  9:00PM

## 2023-12-19 NOTE — PROCEDURE NOTE - ADDITIONAL PROCEDURE DETAILS
#18G 10CM 39CIRC BARD POWER GLIDE MIDLINE inserted with ultrasound guidance.   Good flash, ns flush left cephalic vein.   Patient tolerated well.

## 2023-12-19 NOTE — PROGRESS NOTE ADULT - SUBJECTIVE AND OBJECTIVE BOX
PULMONARY PROGRESS NOTE      [id number]    Patient is a 68y old  Female who presents with a chief complaint of SOB (19 Dec 2023 12:16)      INTERVAL HPI/OVERNIGHT EVENTS:  alert,on BiPAP speaking around mask  no CP or sputum reported  has back pain  has been on NIV last 24 plus hrs  MEDICATIONS  (STANDING):  atorvastatin 80 milliGRAM(s) Oral at bedtime  budesonide  80 MICROgram(s)/formoterol 4.5 MICROgram(s) Inhaler 2 Puff(s) Inhalation two times a day  dextrose 5%. 1000 milliLiter(s) (50 mL/Hr) IV Continuous <Continuous>  dextrose 5%. 1000 milliLiter(s) (100 mL/Hr) IV Continuous <Continuous>  dextrose 50% Injectable 25 Gram(s) IV Push once  dextrose 50% Injectable 12.5 Gram(s) IV Push once  dextrose 50% Injectable 25 Gram(s) IV Push once  furosemide   Injectable 40 milliGRAM(s) IV Push daily  glucagon  Injectable 1 milliGRAM(s) IntraMuscular once  heparin   Injectable 5000 Unit(s) SubCutaneous every 8 hours  insulin glargine Injectable (LANTUS) 80 Unit(s) SubCutaneous at bedtime  insulin lispro (ADMELOG) corrective regimen sliding scale   SubCutaneous every 6 hours  insulin lispro Injectable (ADMELOG) 18 Unit(s) SubCutaneous three times a day before meals  lactulose Syrup 20 Gram(s) Oral daily  methylPREDNISolone sodium succinate Injectable 40 milliGRAM(s) IV Push every 8 hours  oxyCODONE  ER Tablet 60 milliGRAM(s) Oral every 12 hours  piperacillin/tazobactam IVPB.- 3.375 Gram(s) IV Intermittent once  piperacillin/tazobactam IVPB.. 3.375 Gram(s) IV Intermittent every 8 hours  polyethylene glycol 3350 17 Gram(s) Oral daily  senna 2 Tablet(s) Oral at bedtime  tiotropium 2.5 MICROgram(s) Inhaler 2 Puff(s) Inhalation daily      MEDICATIONS  (PRN):  acetaminophen     Tablet .. 650 milliGRAM(s) Oral every 6 hours PRN Temp greater or equal to 38C (100.4F), Mild Pain (1 - 3)  albuterol    90 MICROgram(s) HFA Inhaler 2 Puff(s) Inhalation every 2 hours PRN Shortness of Breath and/or Wheezing  albuterol/ipratropium for Nebulization 3 milliLiter(s) Nebulizer every 6 hours PRN Bronchospasm  ALPRAZolam 0.25 milliGRAM(s) Oral at bedtime PRN for anxiety/Sleep  dextrose Oral Gel 15 Gram(s) Oral once PRN Blood Glucose LESS THAN 70 milliGRAM(s)/deciliter  diphenhydrAMINE 25 milliGRAM(s) Oral every 6 hours PRN Rash and/or Itching  hydrALAZINE Injectable 10 milliGRAM(s) IV Push every 4 hours PRN for systolic bp > 160  HYDROmorphone  Injectable 1 milliGRAM(s) IV Push every 4 hours PRN breakthrough  pain  HYDROmorphone  Injectable 0.5 milliGRAM(s) IV Push every 6 hours PRN Severe Pain (7 - 10)  melatonin 3 milliGRAM(s) Oral at bedtime PRN Insomnia  ondansetron Injectable 4 milliGRAM(s) IV Push every 8 hours PRN Nausea and/or Vomiting  oxyCODONE    IR 5 milliGRAM(s) Oral every 6 hours PRN Moderate Pain (4 - 6)      Allergies    wool- rash, itch (Other)  adhesives (Rash)  latex (Rash)  Bactrim (Flushing)    Intolerances        PAST MEDICAL & SURGICAL HISTORY:  Diabetes Mellitus Type II      HTN (Hypertension)      Endometrial Hyperplasia      Cervical Stenosis of Spine      Spinal Stenosis, Lumbar      Deep Vein Thrombosis (DVT)  Left leg, 2004, treated and resolved      Dyslipidemia      Cataract      Morbid Obesity      Vitamin D deficiency      Insomnia      CKD (chronic kidney disease)  ~ III      Congestive heart failure  ~ HFpEF      Uterine cancer      Asthma      On home oxygen therapy      Gait difficulty  ~ u ses walker      Cataract extracted with lens implant 1998  Right      C Section 1994      Cholecystectomy/appendectomy @ age 25      D&C x2 1980's      D&C 2008  hysteroscopy, endometrial hyperplasia, 2009      Cervical Spinal Stenosis surgery x2 (01/2002, 7/2002)      Tonsillectomy as a child      Endometrial biopsy 12/02/09      H/O laser iridotomy  left eye, 2016      H/O colonoscopy  1998      S/P total abdominal hysterectomy and bilateral salpingo-oophorectomy      S/P appendectomy      S/P cholecystectomy          SOCIAL HISTORY  Smoking History:       REVIEW OF SYSTEMS:    CONSTITUTIONAL:  No distress    HEENT:  Eyes:  No diplopia or blurred vision. ENT:  No earache, sore throat or runny nose.    CARDIOVASCULAR:  No pressure, squeezing, tightness, heaviness or aching about the chest; no palpitations.    RESPIRATORY:  see HPI    GASTROINTESTINAL:  No nausea, vomiting or diarrhea.    GENITOURINARY:  No dysuria, frequency or urgency.    NEUROLOGIC:  No paresthesias, fasciculations, seizures or weakness.    PSYCHIATRIC:  No disorder of thought or mood.    Vital Signs Last 24 Hrs  T(C): 36.6 (19 Dec 2023 08:34), Max: 37.1 (18 Dec 2023 15:41)  T(F): 97.9 (19 Dec 2023 08:34), Max: 98.8 (18 Dec 2023 15:41)  HR: 82 (19 Dec 2023 12:39) (67 - 86)  BP: 113/58 (19 Dec 2023 08:00) (91/51 - 130/53)  BP(mean): 72 (19 Dec 2023 08:00) (61 - 72)  RR: 18 (19 Dec 2023 08:00) (18 - 24)  SpO2: 100% (19 Dec 2023 12:39) (95% - 100%)    Parameters below as of 19 Dec 2023 08:00  Patient On (Oxygen Delivery Method): BiPAP/CPAP    O2 Concentration (%): 40    PHYSICAL EXAMINATION:    GENERAL: The patient is awake and alert in no apparent distress.     HEENT: Head is normocephalic and atraumatic. Extraocular muscles are intact. Mucous membranes are moist.    NECK: Supple.    LUNGS: moderate air entry bilat  without wheezing, rales or rhonchi; respirations unlabored    HEART: Regular rate and rhythm without murmur.    ABDOMEN: Soft, nontender, and nondistended.      EXTREMITIES: With edema.    NEUROLOGIC: Grossly intact.    LABS:                        14.2   9.18  )-----------( 214      ( 19 Dec 2023 06:20 )             43.3     12-19    138  |  93<L>  |  51.3<H>  ----------------------------<  308<H>  4.4   |  34.0<H>  |  1.56<H>    Ca    9.5      19 Dec 2023 06:20  Mg     2.2     12-19    TPro  7.1  /  Alb  3.9  /  TBili  0.8  /  DBili  x   /  AST  22  /  ALT  27  /  AlkPhos  109  12-18      Urinalysis Basic - ( 19 Dec 2023 06:20 )    Color: x / Appearance: x / SG: x / pH: x  Gluc: 308 mg/dL / Ketone: x  / Bili: x / Urobili: x   Blood: x / Protein: x / Nitrite: x   Leuk Esterase: x / RBC: x / WBC x   Sq Epi: x / Non Sq Epi: x / Bacteria: x      ABG - ( 19 Dec 2023 09:32 )  pH, Arterial: 7.420 pH, Blood: x     /  pCO2: 68    /  pO2: 118   / HCO3: 44    / Base Excess: 19.6  /  SaO2: 98.9                            MICROBIOLOGY:    RADIOLOGY & ADDITIONAL STUDIES:  CXR/CT scans reviewed PULMONARY PROGRESS NOTE      [facility]-[id number]    Patient is a 68y old  Female who presents with a chief complaint of SOB (19 Dec 2023 12:16)      INTERVAL HPI/OVERNIGHT EVENTS:  alert,on BiPAP speaking around mask  no CP or sputum reported  has back pain  has been on NIV last 24 plus hrs  MEDICATIONS  (STANDING):  atorvastatin 80 milliGRAM(s) Oral at bedtime  budesonide  80 MICROgram(s)/formoterol 4.5 MICROgram(s) Inhaler 2 Puff(s) Inhalation two times a day  dextrose 5%. 1000 milliLiter(s) (50 mL/Hr) IV Continuous <Continuous>  dextrose 5%. 1000 milliLiter(s) (100 mL/Hr) IV Continuous <Continuous>  dextrose 50% Injectable 25 Gram(s) IV Push once  dextrose 50% Injectable 12.5 Gram(s) IV Push once  dextrose 50% Injectable 25 Gram(s) IV Push once  furosemide   Injectable 40 milliGRAM(s) IV Push daily  glucagon  Injectable 1 milliGRAM(s) IntraMuscular once  heparin   Injectable 5000 Unit(s) SubCutaneous every 8 hours  insulin glargine Injectable (LANTUS) 80 Unit(s) SubCutaneous at bedtime  insulin lispro (ADMELOG) corrective regimen sliding scale   SubCutaneous every 6 hours  insulin lispro Injectable (ADMELOG) 18 Unit(s) SubCutaneous three times a day before meals  lactulose Syrup 20 Gram(s) Oral daily  methylPREDNISolone sodium succinate Injectable 40 milliGRAM(s) IV Push every 8 hours  oxyCODONE  ER Tablet 60 milliGRAM(s) Oral every 12 hours  piperacillin/tazobactam IVPB.- 3.375 Gram(s) IV Intermittent once  piperacillin/tazobactam IVPB.. 3.375 Gram(s) IV Intermittent every 8 hours  polyethylene glycol 3350 17 Gram(s) Oral daily  senna 2 Tablet(s) Oral at bedtime  tiotropium 2.5 MICROgram(s) Inhaler 2 Puff(s) Inhalation daily      MEDICATIONS  (PRN):  acetaminophen     Tablet .. 650 milliGRAM(s) Oral every 6 hours PRN Temp greater or equal to 38C (100.4F), Mild Pain (1 - 3)  albuterol    90 MICROgram(s) HFA Inhaler 2 Puff(s) Inhalation every 2 hours PRN Shortness of Breath and/or Wheezing  albuterol/ipratropium for Nebulization 3 milliLiter(s) Nebulizer every 6 hours PRN Bronchospasm  ALPRAZolam 0.25 milliGRAM(s) Oral at bedtime PRN for anxiety/Sleep  dextrose Oral Gel 15 Gram(s) Oral once PRN Blood Glucose LESS THAN 70 milliGRAM(s)/deciliter  diphenhydrAMINE 25 milliGRAM(s) Oral every 6 hours PRN Rash and/or Itching  hydrALAZINE Injectable 10 milliGRAM(s) IV Push every 4 hours PRN for systolic bp > 160  HYDROmorphone  Injectable 1 milliGRAM(s) IV Push every 4 hours PRN breakthrough  pain  HYDROmorphone  Injectable 0.5 milliGRAM(s) IV Push every 6 hours PRN Severe Pain (7 - 10)  melatonin 3 milliGRAM(s) Oral at bedtime PRN Insomnia  ondansetron Injectable 4 milliGRAM(s) IV Push every 8 hours PRN Nausea and/or Vomiting  oxyCODONE    IR 5 milliGRAM(s) Oral every 6 hours PRN Moderate Pain (4 - 6)      Allergies    wool- rash, itch (Other)  adhesives (Rash)  latex (Rash)  Bactrim (Flushing)    Intolerances        PAST MEDICAL & SURGICAL HISTORY:  Diabetes Mellitus Type II      HTN (Hypertension)      Endometrial Hyperplasia      Cervical Stenosis of Spine      Spinal Stenosis, Lumbar      Deep Vein Thrombosis (DVT)  Left leg, 2004, treated and resolved      Dyslipidemia      Cataract      Morbid Obesity      Vitamin D deficiency      Insomnia      CKD (chronic kidney disease)  ~ III      Congestive heart failure  ~ HFpEF      Uterine cancer      Asthma      On home oxygen therapy      Gait difficulty  ~ u ses walker      Cataract extracted with lens implant 1998  Right      C Section 1994      Cholecystectomy/appendectomy @ age 25      D&C x2 1980's      D&C 2008  hysteroscopy, endometrial hyperplasia, 2009      Cervical Spinal Stenosis surgery x2 (01/2002, 7/2002)      Tonsillectomy as a child      Endometrial biopsy 12/02/09      H/O laser iridotomy  left eye, 2016      H/O colonoscopy  1998      S/P total abdominal hysterectomy and bilateral salpingo-oophorectomy      S/P appendectomy      S/P cholecystectomy          SOCIAL HISTORY  Smoking History:       REVIEW OF SYSTEMS:    CONSTITUTIONAL:  No distress    HEENT:  Eyes:  No diplopia or blurred vision. ENT:  No earache, sore throat or runny nose.    CARDIOVASCULAR:  No pressure, squeezing, tightness, heaviness or aching about the chest; no palpitations.    RESPIRATORY:  see HPI    GASTROINTESTINAL:  No nausea, vomiting or diarrhea.    GENITOURINARY:  No dysuria, frequency or urgency.    NEUROLOGIC:  No paresthesias, fasciculations, seizures or weakness.    PSYCHIATRIC:  No disorder of thought or mood.    Vital Signs Last 24 Hrs  T(C): 36.6 (19 Dec 2023 08:34), Max: 37.1 (18 Dec 2023 15:41)  T(F): 97.9 (19 Dec 2023 08:34), Max: 98.8 (18 Dec 2023 15:41)  HR: 82 (19 Dec 2023 12:39) (67 - 86)  BP: 113/58 (19 Dec 2023 08:00) (91/51 - 130/53)  BP(mean): 72 (19 Dec 2023 08:00) (61 - 72)  RR: 18 (19 Dec 2023 08:00) (18 - 24)  SpO2: 100% (19 Dec 2023 12:39) (95% - 100%)    Parameters below as of 19 Dec 2023 08:00  Patient On (Oxygen Delivery Method): BiPAP/CPAP    O2 Concentration (%): 40    PHYSICAL EXAMINATION:    GENERAL: The patient is awake and alert in no apparent distress.     HEENT: Head is normocephalic and atraumatic. Extraocular muscles are intact. Mucous membranes are moist.    NECK: Supple.    LUNGS: moderate air entry bilat  without wheezing, rales or rhonchi; respirations unlabored    HEART: Regular rate and rhythm without murmur.    ABDOMEN: Soft, nontender, and nondistended.      EXTREMITIES: With edema.    NEUROLOGIC: Grossly intact.    LABS:                        14.2   9.18  )-----------( 214      ( 19 Dec 2023 06:20 )             43.3     12-19    138  |  93<L>  |  51.3<H>  ----------------------------<  308<H>  4.4   |  34.0<H>  |  1.56<H>    Ca    9.5      19 Dec 2023 06:20  Mg     2.2     12-19    TPro  7.1  /  Alb  3.9  /  TBili  0.8  /  DBili  x   /  AST  22  /  ALT  27  /  AlkPhos  109  12-18      Urinalysis Basic - ( 19 Dec 2023 06:20 )    Color: x / Appearance: x / SG: x / pH: x  Gluc: 308 mg/dL / Ketone: x  / Bili: x / Urobili: x   Blood: x / Protein: x / Nitrite: x   Leuk Esterase: x / RBC: x / WBC x   Sq Epi: x / Non Sq Epi: x / Bacteria: x      ABG - ( 19 Dec 2023 09:32 )  pH, Arterial: 7.420 pH, Blood: x     /  pCO2: 68    /  pO2: 118   / HCO3: 44    / Base Excess: 19.6  /  SaO2: 98.9                            MICROBIOLOGY:    RADIOLOGY & ADDITIONAL STUDIES:  CXR/CT scans reviewed

## 2023-12-19 NOTE — CONSULT NOTE ADULT - SUBJECTIVE AND OBJECTIVE BOX
assessment/plan:    68-year-old  female with history of essential hypertension type 2 diabetes mellitus spinal stenosis morbid obesity dyslipidemia vitamin D deficiency CKD stage III heart failure with preserved EF with RV failure with severe pulmonary hypertension asthma with chronic hypoxic respiratory failure with presumed PE (no CT angio of the chest with negative bilateral lower extremity Doppler on anticoagulation with apixaban) with recent admission for anal fissures  who was at rehab complaining of worsening shortness of breath started on p.o. antibiotics couple of days ago with worsening shortness of breath being admitted for    Acute on chronic hypoxic Hypercapnic respiratory failure  RSV pneumonia  Secondary bacterial pneumonia  RV failure: Acute on chronic with existing moderate to severe pulmonary hypertension  Morbid obesity with possible obesity hypoventilation syndrome/obstructive sleep apnea   hyperglycemia with underlying type 2 diabetes mellitus        Patient seen and examined  Recommend:  Trial off of bilevel with improved oxygenation and ventilation as well as respiratory distress  Would recommend adding anxiolytic on top  of pain management   persistent  hyperglycemia, need better blood sugar management currently on Lantus and sliding scale might need of escalation of the Lantus dose with moderate sliding scale and Premeal to be added once tolerating diet, endocrinology consult as needed   agree with aggressive diuresis with IV Lasix and addition of metolazone/Aldactone as needed  Holding off of Eliquis with plan from cardiology for right heart cath with possible left heart cath through this hospital course  Empiric antibiotics to be continued  Management of bilevel/oxygen as per pulmonary team, currently stable  Around-the-clock DuoNeb with as needed albuterol and Pulmicort to be continued with chest vest  If recovered from this hospital stay, bariatric surgery referral and aggressive management on weight loss going forward for meaningful recovery  _________________________________    Chief complaint:        HPI/Hospital course:  68-year-old  female  with history of essential hypertension type 2 diabetes mellitus spinal stenosis morbid obesity dyslipidemia vitamin D deficiency CKD stage III heart failure with preserved EF with RV failure with severe pulmonary hypertension asthma with chronic hypoxic respiratory failure with presumed PE (no CT angio of the chest with negative bilateral lower extremity Doppler on anticoagulation with apixaban) with recent admission for anal fissures  who was at rehab complaining of worsening shortness of breath started on p.o. antibiotics couple of days ago with worsening shortness of breath being admitted for Acute on chronic hypoxic Hypercapnic respiratory failure   medical ICU consulted for respiratory distress/BiPAP dependency.  Upon my arrival patient mentioned that she is little bit anxious but ready to come off of bilevel.  ABG performed which showed compensated elevated CO2 and serum bicarb with pH of 7.4.  Patient claimed that she is thirsty but denied any further difficulty breathing but felt anxious about overall health.    Review of systems:  All systems reviewed with symptoms mentions as above.       PAST MEDICAL & SURGICAL HISTORY:  Diabetes Mellitus Type II      HTN (Hypertension)      Endometrial Hyperplasia      Cervical Stenosis of Spine      Spinal Stenosis, Lumbar      Deep Vein Thrombosis (DVT)  Left leg, 2004, treated and resolved      Dyslipidemia      Cataract      Morbid Obesity      Vitamin D deficiency      Insomnia      CKD (chronic kidney disease)  ~ III      Congestive heart failure  ~ HFpEF      Uterine cancer      Asthma      On home oxygen therapy      Gait difficulty  ~ u ses walker      Cataract extracted with lens implant 1998  Right      C Section 1994      Cholecystectomy/appendectomy @ age 25      D&C x2 1980's      D&C 2008  hysteroscopy, endometrial hyperplasia, 2009      Cervical Spinal Stenosis surgery x2 (01/2002, 7/2002)      Tonsillectomy as a child      Endometrial biopsy 12/02/09      H/O laser iridotomy  left eye, 2016      H/O colonoscopy  1998      S/P total abdominal hysterectomy and bilateral salpingo-oophorectomy      S/P appendectomy      S/P cholecystectomy          FAMILY HISTORY:  Family history of pancreatic cancer    Family history of bladder cancer    Family history of lung cancer (Grandparent)        Social History:  Does not smoke, drink or use illicit substances (16 Dec 2023 16:12)    Allergies  wool- rash, itch (Other)  adhesives (Rash)  latex (Rash)  Bactrim ([place])      MEDICATIONS  (STANDING):  atorvastatin 80 milliGRAM(s) Oral at bedtime  budesonide  80 MICROgram(s)/formoterol 4.5 MICROgram(s) Inhaler 2 Puff(s) Inhalation two times a day  dextrose 5%. 1000 milliLiter(s) (100 mL/Hr) IV Continuous <Continuous>  dextrose 5%. 1000 milliLiter(s) (50 mL/Hr) IV Continuous <Continuous>  dextrose 50% Injectable 25 Gram(s) IV Push once  dextrose 50% Injectable 12.5 Gram(s) IV Push once  dextrose 50% Injectable 25 Gram(s) IV Push once  furosemide   Injectable 40 milliGRAM(s) IV Push daily  glucagon  Injectable 1 milliGRAM(s) IntraMuscular once  heparin   Injectable 5000 Unit(s) SubCutaneous every 8 hours  insulin glargine Injectable (LANTUS) 80 Unit(s) SubCutaneous at bedtime  insulin lispro (ADMELOG) corrective regimen sliding scale   SubCutaneous every 6 hours  insulin lispro Injectable (ADMELOG) 25 Unit(s) SubCutaneous three times a day before meals  lactulose Syrup 20 Gram(s) Oral daily  methylPREDNISolone sodium succinate Injectable 40 milliGRAM(s) IV Push every 8 hours  oxyCODONE  ER Tablet 60 milliGRAM(s) Oral every 12 hours  piperacillin/tazobactam IVPB.- 3.375 Gram(s) IV Intermittent once  piperacillin/tazobactam IVPB.. 3.375 Gram(s) IV Intermittent every 8 hours  polyethylene glycol 3350 17 Gram(s) Oral daily  senna 2 Tablet(s) Oral at bedtime  tiotropium 2.5 MICROgram(s) Inhaler 2 Puff(s) Inhalation daily    MEDICATIONS  (PRN):  acetaminophen     Tablet .. 650 milliGRAM(s) Oral every 6 hours PRN Temp greater or equal to 38C (100.4F), Mild Pain (1 - 3)  albuterol    90 MICROgram(s) HFA Inhaler 2 Puff(s) Inhalation every 2 hours PRN Shortness of Breath and/or Wheezing  albuterol/ipratropium for Nebulization 3 milliLiter(s) Nebulizer every 6 hours PRN Bronchospasm  ALPRAZolam 0.25 milliGRAM(s) Oral at bedtime PRN for anxiety/Sleep  dextrose Oral Gel 15 Gram(s) Oral once PRN Blood Glucose LESS THAN 70 milliGRAM(s)/deciliter  diphenhydrAMINE 25 milliGRAM(s) Oral every 6 hours PRN Rash and/or Itching  hydrALAZINE Injectable 10 milliGRAM(s) IV Push every 4 hours PRN for systolic bp > 160  HYDROmorphone  Injectable 0.5 milliGRAM(s) IV Push every 6 hours PRN Severe Pain (7 - 10)  HYDROmorphone  Injectable 1 milliGRAM(s) IV Push every 4 hours PRN breakthrough  pain  melatonin 3 milliGRAM(s) Oral at bedtime PRN Insomnia  ondansetron Injectable 4 milliGRAM(s) IV Push every 8 hours PRN Nausea and/or Vomiting  oxyCODONE    IR 5 milliGRAM(s) Oral every 6 hours PRN Moderate Pain (4 - 6)      Vital Signs Last 24 Hrs  T(C): 36.9 (19 Dec 2023 16:52), Max: 36.9 (19 Dec 2023 16:52)  T(F): 98.5 (19 Dec 2023 16:52), Max: 98.5 (19 Dec 2023 16:52)  HR: 89 (19 Dec 2023 18:00) (67 - 90)  BP: 124/64 (19 Dec 2023 18:00) (91/51 - 140/100)  BP(mean): 80 (19 Dec 2023 18:00) (61 - 80)  RR: 20 (19 Dec 2023 18:00) (18 - 22)  SpO2: 95% (19 Dec 2023 18:00) (94% - 100%)    O2 Parameters below as of 19 Dec 2023 18:00  Patient On (Oxygen Delivery Method): BiPAP/CPAP    O2 Concentration (%): 40        Physical exam:   68-year-old morbidly obese  female sitting in bed with BiPAP in no apparent distress talking in full sentences and little bit anxious but alert awake oriented x 4 with pupil equal bilaterally air entry equal bilaterally with  minimal expiratory wheezing decreased air entry at bilateral bases, S1-S2 heard regular rate rhythm distended abdomen soft nontender bowel sounds present bilateral nonpitting lower extremity edema peripheral pulses palpable chronic venous stasis dermatosis    Labs:Reviewed  Imaging: reviewed  Cultures/pathology: reviewed          Plan of care discussed with patient and primary team  Thank you for your consult.   Please call us back with any worsening clinical status or with concerns & questions.   We will Sign Off for now.     Brad Eng MD   Division of Critical Care Medicine  Department of Medicine   [facility]   Cell [phone]     I have personally provided 70 minutes of critical care time excluding time spent on separate procedures      assessment/plan:    68-year-old  female with history of essential hypertension type 2 diabetes mellitus spinal stenosis morbid obesity dyslipidemia vitamin D deficiency CKD stage III heart failure with preserved EF with RV failure with severe pulmonary hypertension asthma with chronic hypoxic respiratory failure with presumed PE (no CT angio of the chest with negative bilateral lower extremity Doppler on anticoagulation with apixaban) with recent admission for anal fissures  who was at rehab complaining of worsening shortness of breath started on p.o. antibiotics couple of days ago with worsening shortness of breath being admitted for    Acute on chronic hypoxic Hypercapnic respiratory failure  RSV pneumonia  Secondary bacterial pneumonia  RV failure: Acute on chronic with existing moderate to severe pulmonary hypertension  Morbid obesity with possible obesity hypoventilation syndrome/obstructive sleep apnea   hyperglycemia with underlying type 2 diabetes mellitus        Patient seen and examined  Recommend:  Trial off of bilevel with improved oxygenation and ventilation as well as respiratory distress  Would recommend adding anxiolytic on top  of pain management   persistent  hyperglycemia, need better blood sugar management currently on Lantus and sliding scale might need of escalation of the Lantus dose with moderate sliding scale and Premeal to be added once tolerating diet, endocrinology consult as needed   agree with aggressive diuresis with IV Lasix and addition of metolazone/Aldactone as needed  Holding off of Eliquis with plan from cardiology for right heart cath with possible left heart cath through this hospital course  Empiric antibiotics to be continued  Management of bilevel/oxygen as per pulmonary team, currently stable  Around-the-clock DuoNeb with as needed albuterol and Pulmicort to be continued with chest vest  If recovered from this hospital stay, bariatric surgery referral and aggressive management on weight loss going forward for meaningful recovery  _________________________________    Chief complaint:        HPI/Hospital course:  68-year-old  female  with history of essential hypertension type 2 diabetes mellitus spinal stenosis morbid obesity dyslipidemia vitamin D deficiency CKD stage III heart failure with preserved EF with RV failure with severe pulmonary hypertension asthma with chronic hypoxic respiratory failure with presumed PE (no CT angio of the chest with negative bilateral lower extremity Doppler on anticoagulation with apixaban) with recent admission for anal fissures  who was at rehab complaining of worsening shortness of breath started on p.o. antibiotics couple of days ago with worsening shortness of breath being admitted for Acute on chronic hypoxic Hypercapnic respiratory failure   medical ICU consulted for respiratory distress/BiPAP dependency.  Upon my arrival patient mentioned that she is little bit anxious but ready to come off of bilevel.  ABG performed which showed compensated elevated CO2 and serum bicarb with pH of 7.4.  Patient claimed that she is thirsty but denied any further difficulty breathing but felt anxious about overall health.    Review of systems:  All systems reviewed with symptoms mentions as above.       PAST MEDICAL & SURGICAL HISTORY:  Diabetes Mellitus Type II      HTN (Hypertension)      Endometrial Hyperplasia      Cervical Stenosis of Spine      Spinal Stenosis, Lumbar      Deep Vein Thrombosis (DVT)  Left leg, 2004, treated and resolved      Dyslipidemia      Cataract      Morbid Obesity      Vitamin D deficiency      Insomnia      CKD (chronic kidney disease)  ~ III      Congestive heart failure  ~ HFpEF      Uterine cancer      Asthma      On home oxygen therapy      Gait difficulty  ~ u ses walker      Cataract extracted with lens implant 1998  Right      C Section 1994      Cholecystectomy/appendectomy @ age 25      D&C x2 1980's      D&C 2008  hysteroscopy, endometrial hyperplasia, 2009      Cervical Spinal Stenosis surgery x2 (01/2002, 7/2002)      Tonsillectomy as a child      Endometrial biopsy 12/02/09      H/O laser iridotomy  left eye, 2016      H/O colonoscopy  1998      S/P total abdominal hysterectomy and bilateral salpingo-oophorectomy      S/P appendectomy      S/P cholecystectomy          FAMILY HISTORY:  Family history of pancreatic cancer    Family history of bladder cancer    Family history of lung cancer (Grandparent)        Social History:  Does not smoke, drink or use illicit substances (16 Dec 2023 16:12)    Allergies  wool- rash, itch (Other)  adhesives (Rash)  latex (Rash)  Bactrim ([place])      MEDICATIONS  (STANDING):  atorvastatin 80 milliGRAM(s) Oral at bedtime  budesonide  80 MICROgram(s)/formoterol 4.5 MICROgram(s) Inhaler 2 Puff(s) Inhalation two times a day  dextrose 5%. 1000 milliLiter(s) (100 mL/Hr) IV Continuous <Continuous>  dextrose 5%. 1000 milliLiter(s) (50 mL/Hr) IV Continuous <Continuous>  dextrose 50% Injectable 25 Gram(s) IV Push once  dextrose 50% Injectable 12.5 Gram(s) IV Push once  dextrose 50% Injectable 25 Gram(s) IV Push once  furosemide   Injectable 40 milliGRAM(s) IV Push daily  glucagon  Injectable 1 milliGRAM(s) IntraMuscular once  heparin   Injectable 5000 Unit(s) SubCutaneous every 8 hours  insulin glargine Injectable (LANTUS) 80 Unit(s) SubCutaneous at bedtime  insulin lispro (ADMELOG) corrective regimen sliding scale   SubCutaneous every 6 hours  insulin lispro Injectable (ADMELOG) 25 Unit(s) SubCutaneous three times a day before meals  lactulose Syrup 20 Gram(s) Oral daily  methylPREDNISolone sodium succinate Injectable 40 milliGRAM(s) IV Push every 8 hours  oxyCODONE  ER Tablet 60 milliGRAM(s) Oral every 12 hours  piperacillin/tazobactam IVPB.- 3.375 Gram(s) IV Intermittent once  piperacillin/tazobactam IVPB.. 3.375 Gram(s) IV Intermittent every 8 hours  polyethylene glycol 3350 17 Gram(s) Oral daily  senna 2 Tablet(s) Oral at bedtime  tiotropium 2.5 MICROgram(s) Inhaler 2 Puff(s) Inhalation daily    MEDICATIONS  (PRN):  acetaminophen     Tablet .. 650 milliGRAM(s) Oral every 6 hours PRN Temp greater or equal to 38C (100.4F), Mild Pain (1 - 3)  albuterol    90 MICROgram(s) HFA Inhaler 2 Puff(s) Inhalation every 2 hours PRN Shortness of Breath and/or Wheezing  albuterol/ipratropium for Nebulization 3 milliLiter(s) Nebulizer every 6 hours PRN Bronchospasm  ALPRAZolam 0.25 milliGRAM(s) Oral at bedtime PRN for anxiety/Sleep  dextrose Oral Gel 15 Gram(s) Oral once PRN Blood Glucose LESS THAN 70 milliGRAM(s)/deciliter  diphenhydrAMINE 25 milliGRAM(s) Oral every 6 hours PRN Rash and/or Itching  hydrALAZINE Injectable 10 milliGRAM(s) IV Push every 4 hours PRN for systolic bp > 160  HYDROmorphone  Injectable 0.5 milliGRAM(s) IV Push every 6 hours PRN Severe Pain (7 - 10)  HYDROmorphone  Injectable 1 milliGRAM(s) IV Push every 4 hours PRN breakthrough  pain  melatonin 3 milliGRAM(s) Oral at bedtime PRN Insomnia  ondansetron Injectable 4 milliGRAM(s) IV Push every 8 hours PRN Nausea and/or Vomiting  oxyCODONE    IR 5 milliGRAM(s) Oral every 6 hours PRN Moderate Pain (4 - 6)      Vital Signs Last 24 Hrs  T(C): 36.9 (19 Dec 2023 16:52), Max: 36.9 (19 Dec 2023 16:52)  T(F): 98.5 (19 Dec 2023 16:52), Max: 98.5 (19 Dec 2023 16:52)  HR: 89 (19 Dec 2023 18:00) (67 - 90)  BP: 124/64 (19 Dec 2023 18:00) (91/51 - 140/100)  BP(mean): 80 (19 Dec 2023 18:00) (61 - 80)  RR: 20 (19 Dec 2023 18:00) (18 - 22)  SpO2: 95% (19 Dec 2023 18:00) (94% - 100%)    O2 Parameters below as of 19 Dec 2023 18:00  Patient On (Oxygen Delivery Method): BiPAP/CPAP    O2 Concentration (%): 40        Physical exam:   68-year-old morbidly obese  female sitting in bed with BiPAP in no apparent distress talking in full sentences and little bit anxious but alert awake oriented x 4 with pupil equal bilaterally air entry equal bilaterally with  minimal expiratory wheezing decreased air entry at bilateral bases, S1-S2 heard regular rate rhythm distended abdomen soft nontender bowel sounds present bilateral nonpitting lower extremity edema peripheral pulses palpable chronic venous stasis dermatosis    Labs:Reviewed  Imaging: reviewed  Cultures/pathology: reviewed          Plan of care discussed with patient and primary team  Thank you for your consult.   Please call us back with any worsening clinical status or with concerns & questions.   We will Sign Off for now.     Brad Eng MD   Division of Critical Care Medicine  Department of Medicine   [facility]   Cell [phone]     I have personally provided 70 minutes of critical care time excluding time spent on separate procedures

## 2023-12-19 NOTE — PROGRESS NOTE ADULT - SUBJECTIVE AND OBJECTIVE BOX
[facility]YSIC[facility]                                                         CARDIOLOGY AT [facility]                                                                  [address], [place] [address]                                                         Telephone: [phone]. Fax:[phone]                                                                             PROGRESS NOTE    Reason for follow up: Follow up on chf  RSV positive  Update: Still on bipap and npo  Breathing more comfortable  Edema is reducing  b/p on low side  bun/creat is increasing    Review of symptoms:   Cardiac:  No chest pain. + dyspnea. No palpitations.  Respiratory: no cough. + dyspnea  Gastrointestinal: No diarrhea. No abdominal pain. No bleeding.   Neuro: No focal neuro complaints.    Vitals:  T(C): 36.6 (23 @ 08:34), Max: 37.1 (23 @ 10:00)  HR: 75 (23 @ 08:00) (67 - 90)  BP: 113/58 (23 @ 08:00) (91/51 - 130/53)  RR: 18 (23 @ 08:00) (18 - 26)  SpO2: 97% (23 @ 08:00) (94% - 99%)  Wt(kg): --  I&O's Summary    18 Dec 2023 07:01  -  19 Dec 2023 07:00  --------------------------------------------------------  IN: 250 mL / OUT: 1875 mL / NET: -1625 mL      Weight (kg): 177.4 (12-16 @ 10:02)    PHYSICAL EXAM:  Appearance:  Morbidly obese  HEENT:  Atraumatic. Normocephalic.  Normal oral mucosa  Neurologic: A & O x 3, no gross focal deficits.  Cardiovascular: RRR S1 S2, No murmur, no rubs/gallops. No JVD  Respiratory: Lungs  edema  Gastrointestinal:  Obese  Soft, Non-tender, + BS  Lower Extremities: decreased edema  Psychiatry: Patient is calm. No agitation.   Skin: warm and dry.    CURRENT MEDICATIONS:  MEDICATIONS  (STANDING):  atorvastatin 80 milliGRAM(s) Oral at bedtime  budesonide  80 MICROgram(s)/formoterol 4.5 MICROgram(s) Inhaler 2 Puff(s) Inhalation two times a day  furosemide   Injectable 40 milliGRAM(s) IV Push daily  glucagon  Injectable 1 milliGRAM(s) IntraMuscular once  heparin   Injectable 5000 Unit(s) SubCutaneous every 8 hours  insulin glargine Injectable (LANTUS) 80 Unit(s) SubCutaneous at bedtime  insulin lispro (ADMELOG) corrective regimen sliding scale   SubCutaneous every 6 hours  insulin lispro Injectable (ADMELOG) 18 Unit(s) SubCutaneous three times a day before meals  lactulose Syrup 20 Gram(s) Oral daily  methylPREDNISolone sodium succinate Injectable 40 milliGRAM(s) IV Push every 8 hours  oxyCODONE  ER Tablet 60 milliGRAM(s) Oral every 12 hours  piperacillin/tazobactam IVPB.- 3.375 Gram(s) IV Intermittent once  piperacillin/tazobactam IVPB.. 3.375 Gram(s) IV Intermittent every 8 hours  polyethylene glycol 3350 17 Gram(s) Oral daily  senna 2 Tablet(s) Oral at bedtime  tiotropium 2.5 MICROgram(s) Inhaler 2 Puff(s) Inhalation daily    LABS:	 	                        14.2   9.18  )-----------( 214      ( 19 Dec 2023 06:20 )             43.3         138  |  93<L>  |  51.3<H>  ----------------------------<  308<H>  4.4   |  34.0<H>  |  1.56<H>    Ca    9.5      19 Dec 2023 06:20  Mg     2.2         TPro  7.1  /  Alb  3.9  /  TBili  0.8  /  DBili  x   /  AST  22  /  ALT  27  /  AlkPhos  109    proBNP:   Lipid Profile: Date:  @ 08:20  Total cholesterol 140; Direct LDL: --; HDL: 70; Triglycerides:133  HgA1c:   TSH: Thyroid Stimulating Hormone, Serum: 1.51 uIU/mL  Thyroid Stimulating Hormone, Serum: 3.02 uIU/mL    TELEMETRY: NSR  DIAGNOSTIC TESTING:  [ ] Echocardiogram: < from: TTE Echo Complete w/ Contrast w/ Doppler (23 @ 08:19) >  Summary:   1. Technically limited study.   2. Endocardial visualization was enhanced with intravenous echo contrast.   3. Left ventricular ejection fraction, by visual estimation, is 55 to   60%.   4. Grossly Normal global left ventricular systolic function.   5. Spectral Doppler shows pseudonormal pattern of left ventricular   myocardial filling (Grade II diastolic dysfunction).   6. Moderately enlarged right ventricle.   7. Moderately reduced RV systolic function on limited views.   8. The left atrium is normal in size.   9. Mild mitral annular calcification.  10. Mild thickening and calcification of the anterior and posterior   mitral valve leaflets.  11. Mild mitral valve regurgitation.  12. Moderate tricuspid regurgitation.  13. Estimated pulmonary artery systolic pressure is 70.9 mmHg assuming a   right atrial pressure of 5 mmHg, which is consistent with severe   pulmonary hypertension.  14. There is no evidence of pericardial effusion.                                                                [facility]                                                         CARDIOLOGY AT [facility]                                                                  [address], [place] [address]                                                         Telephone: [phone]. Fax:[phone]                                                                             PROGRESS NOTE    Reason for follow up: Follow up on chf  RSV positive  Update: Still on bipap and npo  Breathing more comfortable  Edema is reducing  b/p on low side  bun/creat is increasing    Review of symptoms:   Cardiac:  No chest pain. + dyspnea. No palpitations.  Respiratory: no cough. + dyspnea  Gastrointestinal: No diarrhea. No abdominal pain. No bleeding.   Neuro: No focal neuro complaints.    Vitals:  T(C): 36.6 (23 @ 08:34), Max: 37.1 (23 @ 10:00)  HR: 75 (23 @ 08:00) (67 - 90)  BP: 113/58 (23 @ 08:00) (91/51 - 130/53)  RR: 18 (23 @ 08:00) (18 - 26)  SpO2: 97% (23 @ 08:00) (94% - 99%)  Wt(kg): --  I&O's Summary    18 Dec 2023 07:01  -  19 Dec 2023 07:00  --------------------------------------------------------  IN: 250 mL / OUT: 1875 mL / NET: -1625 mL      Weight (kg): 177.4 (12-16 @ 10:02)    PHYSICAL EXAM:  Appearance:  Morbidly obese  HEENT:  Atraumatic. Normocephalic.  Normal oral mucosa  Neurologic: A & O x 3, no gross focal deficits.  Cardiovascular: RRR S1 S2, No murmur, no rubs/gallops. No JVD  Respiratory: Lungs  edema  Gastrointestinal:  Obese  Soft, Non-tender, + BS  Lower Extremities: decreased edema  Psychiatry: Patient is calm. No agitation.   Skin: warm and dry.    CURRENT MEDICATIONS:  MEDICATIONS  (STANDING):  atorvastatin 80 milliGRAM(s) Oral at bedtime  budesonide  80 MICROgram(s)/formoterol 4.5 MICROgram(s) Inhaler 2 Puff(s) Inhalation two times a day  furosemide   Injectable 40 milliGRAM(s) IV Push daily  glucagon  Injectable 1 milliGRAM(s) IntraMuscular once  heparin   Injectable 5000 Unit(s) SubCutaneous every 8 hours  insulin glargine Injectable (LANTUS) 80 Unit(s) SubCutaneous at bedtime  insulin lispro (ADMELOG) corrective regimen sliding scale   SubCutaneous every 6 hours  insulin lispro Injectable (ADMELOG) 18 Unit(s) SubCutaneous three times a day before meals  lactulose Syrup 20 Gram(s) Oral daily  methylPREDNISolone sodium succinate Injectable 40 milliGRAM(s) IV Push every 8 hours  oxyCODONE  ER Tablet 60 milliGRAM(s) Oral every 12 hours  piperacillin/tazobactam IVPB.- 3.375 Gram(s) IV Intermittent once  piperacillin/tazobactam IVPB.. 3.375 Gram(s) IV Intermittent every 8 hours  polyethylene glycol 3350 17 Gram(s) Oral daily  senna 2 Tablet(s) Oral at bedtime  tiotropium 2.5 MICROgram(s) Inhaler 2 Puff(s) Inhalation daily    LABS:	 	                        14.2   9.18  )-----------( 214      ( 19 Dec 2023 06:20 )             43.3         138  |  93<L>  |  51.3<H>  ----------------------------<  308<H>  4.4   |  34.0<H>  |  1.56<H>    Ca    9.5      19 Dec 2023 06:20  Mg     2.2         TPro  7.1  /  Alb  3.9  /  TBili  0.8  /  DBili  x   /  AST  22  /  ALT  27  /  AlkPhos  109    proBNP:   Lipid Profile: Date:  @ 08:20  Total cholesterol 140; Direct LDL: --; HDL: 70; Triglycerides:133  HgA1c:   TSH: Thyroid Stimulating Hormone, Serum: 1.51 uIU/mL  Thyroid Stimulating Hormone, Serum: 3.02 uIU/mL    TELEMETRY: NSR  DIAGNOSTIC TESTING:  [ ] Echocardiogram: < from: TTE Echo Complete w/ Contrast w/ Doppler (23 @ 08:19) >  Summary:   1. Technically limited study.   2. Endocardial visualization was enhanced with intravenous echo contrast.   3. Left ventricular ejection fraction, by visual estimation, is 55 to   60%.   4. Grossly Normal global left ventricular systolic function.   5. Spectral Doppler shows pseudonormal pattern of left ventricular   myocardial filling (Grade II diastolic dysfunction).   6. Moderately enlarged right ventricle.   7. Moderately reduced RV systolic function on limited views.   8. The left atrium is normal in size.   9. Mild mitral annular calcification.  10. Mild thickening and calcification of the anterior and posterior   mitral valve leaflets.  11. Mild mitral valve regurgitation.  12. Moderate tricuspid regurgitation.  13. Estimated pulmonary artery systolic pressure is 70.9 mmHg assuming a   right atrial pressure of 5 mmHg, which is consistent with severe   pulmonary hypertension.  14. There is no evidence of pericardial effusion.

## 2023-12-19 NOTE — PROGRESS NOTE ADULT - NS ATTEND AMEND GEN_ALL_CORE FT
seen with above,    68F super morbid obesity (BMI >60), ENRIQUE/OHS, chronic respiratory failure, severe pulmonary HTN with RV dysfunction, CKD 3, HFpEF, recent admission 9/2023 with hypoxic respiratory failure unable to get CTPA or VQ scan and was empirically treated with Eliquis now readmitted for acute on chronic hypoxemic respiratory failure on treatment with IV diuresis and empiric Zosyn with Solumedrol, +RSV as well  -remains on nasal BiPAP and dyspneic, not yet stable for R/LHC, will aim for later this week when respiratory status improves, need actual bed weight as cath lab table weight limits of 600 lbs.   -received IV Lasix yesterday 40mg x1, diuresed 4L, Cr. and bicarb stable, will continue IV Lasix 40mg once daily to keep at least 2-3 liters net negative daily, may need addition of acetazolamide if further uptrend in bicarb  -SBP 90s, hold amlodipine for now, may consider spironolactone or Entresto   -ABG with chronic CO2 retention, she is awake/alert and conversive  -empiric Eliquis on hold, recheck D-dimer, need DVT ppx with heparin subQ   -need weight loss         Lenard Van DO, [facility]  Faculty Non-Invasive Cardiologist  [phone] seen with above,    68F super morbid obesity (BMI >60), ENRIQUE/OHS, chronic respiratory failure, severe pulmonary HTN with RV dysfunction, CKD 3, HFpEF, recent admission 9/2023 with hypoxic respiratory failure unable to get CTPA or VQ scan and was empirically treated with Eliquis now readmitted for acute on chronic hypoxemic respiratory failure on treatment with IV diuresis and empiric Zosyn with Solumedrol, +RSV as well  -remains on nasal BiPAP and dyspneic, not yet stable for R/LHC, will aim for later this week when respiratory status improves, need actual bed weight as cath lab table weight limits of 600 lbs.   -received IV Lasix yesterday 40mg x1, diuresed 4L, Cr. and bicarb stable, will continue IV Lasix 40mg once daily to keep at least 2-3 liters net negative daily, may need addition of acetazolamide if further uptrend in bicarb  -SBP 90s, hold amlodipine for now, may consider spironolactone or Entresto   -ABG with chronic CO2 retention, she is awake/alert and conversive  -empiric Eliquis on hold, recheck D-dimer, need DVT ppx with heparin subQ   -need weight loss         Lenard Van DO, [facility]  Faculty Non-Invasive Cardiologist  [phone] seen with above,    68F super morbid obesity (BMI >60), ENRIQUE/OHS, chronic respiratory failure, severe pulmonary HTN with RV dysfunction, CKD 3, HFpEF, recent admission 9/2023 with hypoxic respiratory failure unable to get CTPA or VQ scan and was empirically treated with Eliquis now readmitted for acute on chronic hypoxemic respiratory failure on treatment with IV diuresis and empiric Zosyn with Solumedrol, +RSV as well  -remains on nasal BiPAP and dyspneic, not yet stable for R/LHC, will aim for later this week when respiratory status improves, need actual bed weight as cath lab table weight limits of 600 lbs.   -received IV Lasix 40mg x1 the day prior, diuresed 4L, Cr. and bicarb stable, had 1.8 liters urine from yesterday, continue to aim for net negative output (prior admission Cr. peak at 2.1), may need addition of acetazolamide if further uptrend in bicarb  -SBP 90s, hold amlodipine for now, may consider spironolactone or Entresto   -ABG with chronic CO2 retention, she is awake/alert and conversive  -empiric Eliquis on hold, recheck D-dimer, need DVT ppx with heparin subQ   -need weight loss         Lenard Van DO, [facility]  Faculty Non-Invasive Cardiologist  [phone] seen with above,    68F super morbid obesity (BMI >60), ENRIQUE/OHS, chronic respiratory failure, severe pulmonary HTN with RV dysfunction, CKD 3, HFpEF, recent admission 9/2023 with hypoxic respiratory failure unable to get CTPA or VQ scan and was empirically treated with Eliquis now readmitted for acute on chronic hypoxemic respiratory failure on treatment with IV diuresis and empiric Zosyn with Solumedrol, +RSV as well  -remains on nasal BiPAP and dyspneic, not yet stable for R/LHC, will aim for later this week when respiratory status improves, need actual bed weight as cath lab table weight limits of 600 lbs.   -received IV Lasix 40mg x1 the day prior, diuresed 4L, Cr. and bicarb stable, had 1.8 liters urine from yesterday, continue to aim for net negative output (prior admission Cr. peak at 2.1), may need addition of acetazolamide if further uptrend in bicarb  -SBP 90s, hold amlodipine for now, may consider spironolactone or Entresto   -ABG with chronic CO2 retention, she is awake/alert and conversive  -empiric Eliquis on hold, recheck D-dimer, need DVT ppx with heparin subQ   -need weight loss         Lenard Van DO, [facility]  Faculty Non-Invasive Cardiologist  [phone]

## 2023-12-19 NOTE — PROGRESS NOTE ADULT - ASSESSMENT
69 yo female with HTN, HLD, DM2, HFpEF, CKD III, DVT, Uterine CA, COPD on home o2 who presented with complaints of shortness of breath. Patient reports that she has been sick all week and was recently started on Vantin and steroids while at the nursing home. Patient reports that her dyspnea just worsened and she told the nursing home to send her in for an evaluation. While in the ED, patient was placed on BIPAP and was given IV lasix with some improvement. RVP then results as RSV +. Patient had a CT scan which also showed pneumonia and pulmonary HTN. Admission to medicine was requested for further management.    #Acute on chronic respiratory failure/ likely multifactorial   #Acute HFpEF exacerbation  # likely with Superimposed bacterial Pneumonia in setting of RSV  #Pulmonary hypertension  Admit to SDU  ct noted with rt lung pna   BNP elevated  +RSV  c/w madeline Seay dc as legionella negative  will give iv steroids trial   f/u Blood cx, sputum cx  negative Urine legionella, urine strep, MRSA swab  c/w IV Lasix QD  echo read pending   Strict in and out  Cardiology consulted, plan for cath when respiratory status better  BIPAP- wean as tolerated   pulm consulted, asked pulm to f/u 12/19  If remains BIPAP dependent, will need ICU consult - d/w patient, agreeable to intubation if needed   ABG QD    #Chronic pain  continue home Pain meds of Oxycodone 60 mg q12h and Oxycodone 5mg q6h PRN    #hx of  suspicion for PE.DVT  Patient has empirically been treated for PE/DVT since August  No scans noted to have PE/DVT  duplex lower ext to without dvt   c/w ppx HSQ    #HLD  Atorvastatin for rosuvastatin    #COPD  Steroids as above  Symbicort, Spiriva, albuterol  pulm consulted     #DM  Will place on Lantus 65 + 18 u premeals  NPO for now  ISS  a1c 8.3    DVT prophylaxis: HSQ  Dispo: pending resp status optimization, weaning off BIPAP, glucose monitoring, TTE and eventual cath     67 yo female with HTN, HLD, DM2, HFpEF, CKD III, DVT, Uterine CA, COPD on home o2 who presented with complaints of shortness of breath. Patient reports that she has been sick all week and was recently started on Vantin and steroids while at the nursing home. Patient reports that her dyspnea just worsened and she told the nursing home to send her in for an evaluation. While in the ED, patient was placed on BIPAP and was given IV lasix with some improvement. RVP then results as RSV +. Patient had a CT scan which also showed pneumonia and pulmonary HTN. Admission to medicine was requested for further management.    #Acute on chronic respiratory failure/ likely multifactorial   #Acute HFpEF exacerbation  # likely with Superimposed bacterial Pneumonia in setting of RSV  #Pulmonary hypertension  Admit to SDU  ct noted with rt lung pna   BNP elevated  +RSV  c/w madeline Seay dc as legionella negative  will give iv steroids trial   f/u Blood cx, sputum cx  negative Urine legionella, urine strep, MRSA swab  c/w IV Lasix QD  echo read pending   Strict in and out  Cardiology consulted, plan for cath when respiratory status better  BIPAP- wean as tolerated   pulm consulted, asked pulm to f/u 12/19  If remains BIPAP dependent, will need ICU consult - d/w patient, agreeable to intubation if needed   ABG QD    #Chronic pain  continue home Pain meds of Oxycodone 60 mg q12h and Oxycodone 5mg q6h PRN    #hx of  suspicion for PE.DVT  Patient has empirically been treated for PE/DVT since August  No scans noted to have PE/DVT  duplex lower ext to without dvt   c/w ppx HSQ    #HLD  Atorvastatin for rosuvastatin    #COPD  Steroids as above  Symbicort, Spiriva, albuterol  pulm consulted     #DM  Will place on Lantus 65 + 18 u premeals  NPO for now  ISS  a1c 8.3    DVT prophylaxis: HSQ  Dispo: pending resp status optimization, weaning off BIPAP, glucose monitoring, TTE and eventual cath     69 yo female with HTN, HLD, DM2, HFpEF, CKD III, DVT, Uterine CA, COPD on home o2 who presented with complaints of shortness of breath. Patient reports that she has been sick all week and was recently started on Vantin and steroids while at the nursing home. Patient reports that her dyspnea just worsened and she told the nursing home to send her in for an evaluation. While in the ED, patient was placed on BIPAP and was given IV lasix with some improvement. RVP then results as RSV +. Patient had a CT scan which also showed pneumonia and pulmonary HTN. Admission to medicine was requested for further management.    #Acute on chronic respiratory failure/ likely multifactorial   #Acute HFpEF exacerbation  # likely with Superimposed bacterial Pneumonia in setting of RSV  #Pulmonary hypertension  Admit to SDU  ct noted with rt lung pna   BNP elevated  +RSV  c/w madeline Seay dc as legionella negative  will give iv steroids trial   f/u Blood cx, sputum cx  negative Urine legionella, urine strep, MRSA swab  c/w IV Lasix QD  echo read pending   Strict in and out  Cardiology consulted, plan for cath when respiratory status better  BIPAP- wean as tolerated   pulm consulted, asked pulm to f/u 12/19  As pt remains BIPAP dependent, will consult ICU - d/w patient, agreeable to intubation if needed   ABG QD    #Chronic pain  continue home Pain meds of Oxycodone 60 mg q12h and Oxycodone 5mg q6h PRN    #hx of  suspicion for PE.DVT  Patient has empirically been treated for PE/DVT since August  No scans noted to have PE/DVT  duplex lower ext to without dvt   c/w ppx HSQ    #HLD  Atorvastatin for rosuvastatin    #COPD  Steroids as above  Symbicort, Spiriva, albuterol  pulm consulted     #DM  Will place on Lantus 65 + 18 u premeals  NPO for now  ISS  a1c 8.3    DVT prophylaxis: HSQ  Dispo: pending resp status optimization, weaning off BIPAP, glucose monitoring, TTE and eventual cath     69 yo female with HTN, HLD, DM2, HFpEF, CKD III, DVT, Uterine CA, COPD on home o2 who presented with complaints of shortness of breath. Patient reports that she has been sick all week and was recently started on Vantin and steroids while at the nursing home. Patient reports that her dyspnea just worsened and she told the nursing home to send her in for an evaluation. While in the ED, patient was placed on BIPAP and was given IV lasix with some improvement. RVP then results as RSV +. Patient had a CT scan which also showed pneumonia and pulmonary HTN. Admission to medicine was requested for further management.    #Acute on chronic respiratory failure/ likely multifactorial   #Acute HFpEF exacerbation  # likely with Superimposed bacterial Pneumonia in setting of RSV  #Pulmonary hypertension  Admit to SDU  ct noted with rt lung pna   BNP elevated  +RSV  c/w maedline Seay dc as legionella negative  will give iv steroids trial   f/u Blood cx, sputum cx  negative Urine legionella, urine strep, MRSA swab  c/w IV Lasix QD  echo read pending   Strict in and out  Cardiology consulted, plan for cath when respiratory status better  BIPAP- wean as tolerated   pulm consulted, asked pulm to f/u 12/19  As pt remains BIPAP dependent, will consult ICU - d/w patient, agreeable to intubation if needed   ABG QD    #Chronic pain  continue home Pain meds of Oxycodone 60 mg q12h and Oxycodone 5mg q6h PRN    #hx of  suspicion for PE.DVT  Patient has empirically been treated for PE/DVT since August  No scans noted to have PE/DVT  duplex lower ext to without dvt   c/w ppx HSQ    #HLD  Atorvastatin for rosuvastatin    #COPD  Steroids as above  Symbicort, Spiriva, albuterol  pulm consulted     #DM  Will place on Lantus 65 + 18 u premeals  NPO for now  ISS  a1c 8.3    DVT prophylaxis: HSQ  Dispo: pending resp status optimization, weaning off BIPAP, glucose monitoring, TTE and eventual cath     69 yo female with HTN, HLD, DM2, HFpEF, CKD III, DVT, Uterine CA, COPD on home o2 who presented with complaints of shortness of breath. Patient reports that she has been sick all week and was recently started on Vantin and steroids while at the nursing home. Patient reports that her dyspnea just worsened and she told the nursing home to send her in for an evaluation. While in the ED, patient was placed on BIPAP and was given IV lasix with some improvement. RVP then results as RSV +. Patient had a CT scan which also showed pneumonia and pulmonary HTN. Admission to medicine was requested for further management.    #Acute on chronic respiratory failure/ likely multifactorial   #Acute HFpEF exacerbation  # likely with Superimposed bacterial Pneumonia in setting of RSV  #Pulmonary hypertension  Admit to SDU  ct noted with rt lung pna   BNP elevated  +RSV  c/w madeline Seay dc as legionella negative  will give iv steroids trial   f/u Blood cx, sputum cx  negative Urine legionella, urine strep, MRSA swab  c/w IV Lasix QD  echo read pending   Strict in and out  Cardiology consulted, plan for cath when respiratory status better  BIPAP- wean as tolerated   pulm consulted, asked pulm to f/u 12/19  If pt remains BIPAP dependent, will consult ICU - d/w patient, agreeable to intubation if needed   ABG QD    #Chronic pain  continue home Pain meds of Oxycodone 60 mg q12h and Oxycodone 5mg q6h PRN    #hx of  suspicion for PE.DVT  Patient has empirically been treated for PE/DVT since August  No scans noted to have PE/DVT  duplex lower ext to without dvt   c/w ppx HSQ    #HLD  Atorvastatin for rosuvastatin    #COPD  Steroids as above  Symbicort, Spiriva, albuterol  pulm consulted     #DM  Will place on Lantus 65 + 18 u premeals  NPO for now  ISS  a1c 8.3    DVT prophylaxis: HSQ  Dispo: pending resp status optimization, weaning off BIPAP, glucose monitoring, TTE and eventual cath     67 yo female with HTN, HLD, DM2, HFpEF, CKD III, DVT, Uterine CA, COPD on home o2 who presented with complaints of shortness of breath. Patient reports that she has been sick all week and was recently started on Vantin and steroids while at the nursing home. Patient reports that her dyspnea just worsened and she told the nursing home to send her in for an evaluation. While in the ED, patient was placed on BIPAP and was given IV lasix with some improvement. RVP then results as RSV +. Patient had a CT scan which also showed pneumonia and pulmonary HTN. Admission to medicine was requested for further management.    #Acute on chronic respiratory failure/ likely multifactorial   #Acute HFpEF exacerbation  # likely with Superimposed bacterial Pneumonia in setting of RSV  #Pulmonary hypertension  Admit to SDU  ct noted with rt lung pna   BNP elevated  +RSV  c/w madeline Seay dc as legionella negative  will give iv steroids trial   f/u Blood cx, sputum cx  negative Urine legionella, urine strep, MRSA swab  c/w IV Lasix QD  echo read pending   Strict in and out  Cardiology consulted, plan for cath when respiratory status better  BIPAP- wean as tolerated   pulm consulted, asked pulm to f/u 12/19  If pt remains BIPAP dependent, will consult ICU - d/w patient, agreeable to intubation if needed   ABG QD    #Chronic pain  continue home Pain meds of Oxycodone 60 mg q12h and Oxycodone 5mg q6h PRN    #hx of  suspicion for PE.DVT  Patient has empirically been treated for PE/DVT since August  No scans noted to have PE/DVT  duplex lower ext to without dvt   c/w ppx HSQ    #HLD  Atorvastatin for rosuvastatin    #COPD  Steroids as above  Symbicort, Spiriva, albuterol  pulm consulted     #DM  Will place on Lantus 65 + 18 u premeals  NPO for now  ISS  a1c 8.3    DVT prophylaxis: HSQ  Dispo: pending resp status optimization, weaning off BIPAP, glucose monitoring, TTE and eventual cath     67 yo female with HTN, HLD, DM2, HFpEF, CKD III, DVT, Uterine CA, COPD on home o2 who presented with complaints of shortness of breath. Patient reports that she has been sick all week and was recently started on Vantin and steroids while at the nursing home. Patient reports that her dyspnea just worsened and she told the nursing home to send her in for an evaluation. While in the ED, patient was placed on BIPAP and was given IV lasix with some improvement. RVP then results as RSV +. Patient had a CT scan which also showed pneumonia and pulmonary HTN. Admission to medicine was requested for further management.    #Acute on chronic respiratory failure/ likely multifactorial   #Acute HFpEF exacerbation  # likely with Superimposed bacterial Pneumonia in setting of RSV  #Pulmonary hypertension  Admit to SDU  ct noted with rt lung pna   BNP elevated  +RSV  c/w madeline Seay dc as legionella negative  will give iv steroids trial   f/u Blood cx, sputum cx  negative Urine legionella, urine strep, MRSA swab  c/w IV Lasix QD  echo read pending   Strict in and out  Cardiology consulted, plan for cath when respiratory status better  BIPAP- wean as tolerated   pulm consulted, asked pulm to f/u 12/19  If pt remains BIPAP dependent, will consult ICU - d/w patient, agreeable to intubation if needed   ABG QD    #Chronic pain  continue home Pain meds of Oxycodone 60 mg q12h and Oxycodone 5mg q6h PRN    #hx of  suspicion for PE.DVT  Patient has empirically been treated for PE/DVT since August  No scans noted to have PE/DVT  duplex lower ext to without dvt   c/w ppx HSQ    #HLD  Atorvastatin for rosuvastatin    #COPD  Steroids as above  Symbicort, Spiriva, albuterol  pulm consulted     #DM  Increase insulin to Lantus 80 + 25 u premeals  q6hr FS/insulin when NPO  ISS  a1c 8.3    DVT prophylaxis: HSQ  Dispo: pending resp status optimization, weaning off BIPAP, glucose monitoring, TTE and eventual cath

## 2023-12-19 NOTE — PROGRESS NOTE ADULT - PROBLEM SELECTOR PLAN 2
RSV positive with secondary Resp infection   on zosyn  c/w tiotropium  and formoterol  wean bipap?    Current meds cardiac meds  atorvastatin 80 milliGRAM(s) Oral at bedtime  furosemide   Injectable 40 milliGRAM(s) IV Push daily

## 2023-12-20 LAB
ANION GAP SERPL CALC-SCNC: 8 MMOL/L — SIGNIFICANT CHANGE UP (ref 5–17)
ANION GAP SERPL CALC-SCNC: 8 MMOL/L — SIGNIFICANT CHANGE UP (ref 5–17)
BUN SERPL-MCNC: 63.8 MG/DL — HIGH (ref 8–20)
BUN SERPL-MCNC: 63.8 MG/DL — HIGH (ref 8–20)
CALCIUM SERPL-MCNC: 9.5 MG/DL — SIGNIFICANT CHANGE UP (ref 8.4–10.5)
CALCIUM SERPL-MCNC: 9.5 MG/DL — SIGNIFICANT CHANGE UP (ref 8.4–10.5)
CHLORIDE SERPL-SCNC: 90 MMOL/L — LOW (ref 96–108)
CHLORIDE SERPL-SCNC: 90 MMOL/L — LOW (ref 96–108)
CO2 SERPL-SCNC: 38 MMOL/L — HIGH (ref 22–29)
CO2 SERPL-SCNC: 38 MMOL/L — HIGH (ref 22–29)
CREAT SERPL-MCNC: 1.61 MG/DL — HIGH (ref 0.5–1.3)
CREAT SERPL-MCNC: 1.61 MG/DL — HIGH (ref 0.5–1.3)
D DIMER BLD IA.RAPID-MCNC: <150 NG/ML DDU — SIGNIFICANT CHANGE UP
D DIMER BLD IA.RAPID-MCNC: <150 NG/ML DDU — SIGNIFICANT CHANGE UP
EGFR: 34 ML/MIN/1.73M2 — LOW
EGFR: 34 ML/MIN/1.73M2 — LOW
GAS PNL BLDA: SIGNIFICANT CHANGE UP
GAS PNL BLDA: SIGNIFICANT CHANGE UP
GLUCOSE BLDC GLUCOMTR-MCNC: 268 MG/DL — HIGH (ref 70–99)
GLUCOSE BLDC GLUCOMTR-MCNC: 268 MG/DL — HIGH (ref 70–99)
GLUCOSE BLDC GLUCOMTR-MCNC: 290 MG/DL — HIGH (ref 70–99)
GLUCOSE BLDC GLUCOMTR-MCNC: 290 MG/DL — HIGH (ref 70–99)
GLUCOSE BLDC GLUCOMTR-MCNC: 294 MG/DL — HIGH (ref 70–99)
GLUCOSE BLDC GLUCOMTR-MCNC: 294 MG/DL — HIGH (ref 70–99)
GLUCOSE BLDC GLUCOMTR-MCNC: 356 MG/DL — HIGH (ref 70–99)
GLUCOSE BLDC GLUCOMTR-MCNC: 356 MG/DL — HIGH (ref 70–99)
GLUCOSE BLDC GLUCOMTR-MCNC: 387 MG/DL — HIGH (ref 70–99)
GLUCOSE BLDC GLUCOMTR-MCNC: 387 MG/DL — HIGH (ref 70–99)
GLUCOSE BLDC GLUCOMTR-MCNC: 401 MG/DL — HIGH (ref 70–99)
GLUCOSE BLDC GLUCOMTR-MCNC: 401 MG/DL — HIGH (ref 70–99)
GLUCOSE SERPL-MCNC: 397 MG/DL — HIGH (ref 70–99)
GLUCOSE SERPL-MCNC: 397 MG/DL — HIGH (ref 70–99)
HCT VFR BLD CALC: 42.3 % — SIGNIFICANT CHANGE UP (ref 34.5–45)
HCT VFR BLD CALC: 42.3 % — SIGNIFICANT CHANGE UP (ref 34.5–45)
HGB BLD-MCNC: 14 G/DL — SIGNIFICANT CHANGE UP (ref 11.5–15.5)
HGB BLD-MCNC: 14 G/DL — SIGNIFICANT CHANGE UP (ref 11.5–15.5)
MAGNESIUM SERPL-MCNC: 2.2 MG/DL — SIGNIFICANT CHANGE UP (ref 1.6–2.6)
MAGNESIUM SERPL-MCNC: 2.2 MG/DL — SIGNIFICANT CHANGE UP (ref 1.6–2.6)
MCHC RBC-ENTMCNC: 29.9 PG — SIGNIFICANT CHANGE UP (ref 27–34)
MCHC RBC-ENTMCNC: 29.9 PG — SIGNIFICANT CHANGE UP (ref 27–34)
MCHC RBC-ENTMCNC: 33.1 GM/DL — SIGNIFICANT CHANGE UP (ref 32–36)
MCHC RBC-ENTMCNC: 33.1 GM/DL — SIGNIFICANT CHANGE UP (ref 32–36)
MCV RBC AUTO: 90.4 FL — SIGNIFICANT CHANGE UP (ref 80–100)
MCV RBC AUTO: 90.4 FL — SIGNIFICANT CHANGE UP (ref 80–100)
NT-PROBNP SERPL-SCNC: 712 PG/ML — HIGH (ref 0–300)
NT-PROBNP SERPL-SCNC: 712 PG/ML — HIGH (ref 0–300)
PLATELET # BLD AUTO: 233 K/UL — SIGNIFICANT CHANGE UP (ref 150–400)
PLATELET # BLD AUTO: 233 K/UL — SIGNIFICANT CHANGE UP (ref 150–400)
POTASSIUM SERPL-MCNC: 4 MMOL/L — SIGNIFICANT CHANGE UP (ref 3.5–5.3)
POTASSIUM SERPL-MCNC: 4 MMOL/L — SIGNIFICANT CHANGE UP (ref 3.5–5.3)
POTASSIUM SERPL-SCNC: 4 MMOL/L — SIGNIFICANT CHANGE UP (ref 3.5–5.3)
POTASSIUM SERPL-SCNC: 4 MMOL/L — SIGNIFICANT CHANGE UP (ref 3.5–5.3)
RBC # BLD: 4.68 M/UL — SIGNIFICANT CHANGE UP (ref 3.8–5.2)
RBC # BLD: 4.68 M/UL — SIGNIFICANT CHANGE UP (ref 3.8–5.2)
RBC # FLD: 13.7 % — SIGNIFICANT CHANGE UP (ref 10.3–14.5)
RBC # FLD: 13.7 % — SIGNIFICANT CHANGE UP (ref 10.3–14.5)
SODIUM SERPL-SCNC: 136 MMOL/L — SIGNIFICANT CHANGE UP (ref 135–145)
SODIUM SERPL-SCNC: 136 MMOL/L — SIGNIFICANT CHANGE UP (ref 135–145)
WBC # BLD: 11.67 K/UL — HIGH (ref 3.8–10.5)
WBC # BLD: 11.67 K/UL — HIGH (ref 3.8–10.5)
WBC # FLD AUTO: 11.67 K/UL — HIGH (ref 3.8–10.5)
WBC # FLD AUTO: 11.67 K/UL — HIGH (ref 3.8–10.5)

## 2023-12-20 PROCEDURE — 99233 SBSQ HOSP IP/OBS HIGH 50: CPT

## 2023-12-20 PROCEDURE — 99223 1ST HOSP IP/OBS HIGH 75: CPT

## 2023-12-20 PROCEDURE — 99234 HOSP IP/OBS SM DT SF/LOW 45: CPT

## 2023-12-20 RX ORDER — ALPRAZOLAM 0.25 MG
0.25 TABLET ORAL EVERY 8 HOURS
Refills: 0 | Status: DISCONTINUED | OUTPATIENT
Start: 2023-12-20 | End: 2023-12-27

## 2023-12-20 RX ORDER — IPRATROPIUM/ALBUTEROL SULFATE 18-103MCG
3 AEROSOL WITH ADAPTER (GRAM) INHALATION EVERY 6 HOURS
Refills: 0 | Status: DISCONTINUED | OUTPATIENT
Start: 2023-12-20 | End: 2023-12-21

## 2023-12-20 RX ORDER — INSULIN LISPRO 100/ML
30 VIAL (ML) SUBCUTANEOUS
Refills: 0 | Status: DISCONTINUED | OUTPATIENT
Start: 2023-12-20 | End: 2023-12-21

## 2023-12-20 RX ADMIN — ATORVASTATIN CALCIUM 80 MILLIGRAM(S): 80 TABLET, FILM COATED ORAL at 22:17

## 2023-12-20 RX ADMIN — Medication 3 MILLILITER(S): at 20:44

## 2023-12-20 RX ADMIN — Medication 25 UNIT(S): at 13:06

## 2023-12-20 RX ADMIN — Medication 40 MILLIGRAM(S): at 06:24

## 2023-12-20 RX ADMIN — Medication 30 UNIT(S): at 18:13

## 2023-12-20 RX ADMIN — HEPARIN SODIUM 5000 UNIT(S): 5000 INJECTION INTRAVENOUS; SUBCUTANEOUS at 22:16

## 2023-12-20 RX ADMIN — PIPERACILLIN AND TAZOBACTAM 25 GRAM(S): 4; .5 INJECTION, POWDER, LYOPHILIZED, FOR SOLUTION INTRAVENOUS at 13:07

## 2023-12-20 RX ADMIN — BUDESONIDE AND FORMOTEROL FUMARATE DIHYDRATE 2 PUFF(S): 160; 4.5 AEROSOL RESPIRATORY (INHALATION) at 09:24

## 2023-12-20 RX ADMIN — PIPERACILLIN AND TAZOBACTAM 25 GRAM(S): 4; .5 INJECTION, POWDER, LYOPHILIZED, FOR SOLUTION INTRAVENOUS at 22:17

## 2023-12-20 RX ADMIN — Medication 6: at 18:13

## 2023-12-20 RX ADMIN — Medication 10: at 13:07

## 2023-12-20 RX ADMIN — OXYCODONE HYDROCHLORIDE 60 MILLIGRAM(S): 5 TABLET ORAL at 18:12

## 2023-12-20 RX ADMIN — Medication 25 UNIT(S): at 09:16

## 2023-12-20 RX ADMIN — HEPARIN SODIUM 5000 UNIT(S): 5000 INJECTION INTRAVENOUS; SUBCUTANEOUS at 13:04

## 2023-12-20 RX ADMIN — POLYETHYLENE GLYCOL 3350 17 GRAM(S): 17 POWDER, FOR SOLUTION ORAL at 13:08

## 2023-12-20 RX ADMIN — Medication 40 MILLIGRAM(S): at 06:25

## 2023-12-20 RX ADMIN — OXYCODONE HYDROCHLORIDE 60 MILLIGRAM(S): 5 TABLET ORAL at 18:42

## 2023-12-20 RX ADMIN — HEPARIN SODIUM 5000 UNIT(S): 5000 INJECTION INTRAVENOUS; SUBCUTANEOUS at 06:25

## 2023-12-20 RX ADMIN — OXYCODONE HYDROCHLORIDE 60 MILLIGRAM(S): 5 TABLET ORAL at 06:23

## 2023-12-20 RX ADMIN — Medication 12: at 06:24

## 2023-12-20 RX ADMIN — BUDESONIDE AND FORMOTEROL FUMARATE DIHYDRATE 2 PUFF(S): 160; 4.5 AEROSOL RESPIRATORY (INHALATION) at 20:44

## 2023-12-20 RX ADMIN — Medication 40 MILLIGRAM(S): at 22:17

## 2023-12-20 RX ADMIN — ONDANSETRON 4 MILLIGRAM(S): 8 TABLET, FILM COATED ORAL at 12:57

## 2023-12-20 RX ADMIN — Medication 0.25 MILLIGRAM(S): at 16:24

## 2023-12-20 RX ADMIN — INSULIN GLARGINE 80 UNIT(S): 100 INJECTION, SOLUTION SUBCUTANEOUS at 22:31

## 2023-12-20 RX ADMIN — Medication 40 MILLIGRAM(S): at 13:06

## 2023-12-20 RX ADMIN — PIPERACILLIN AND TAZOBACTAM 25 GRAM(S): 4; .5 INJECTION, POWDER, LYOPHILIZED, FOR SOLUTION INTRAVENOUS at 06:23

## 2023-12-20 NOTE — PROGRESS NOTE ADULT - SUBJECTIVE AND OBJECTIVE BOX
PULMONARY PROGRESS NOTE      [id number]    Patient is a 68y old  Female who presents with a chief complaint of SOB (20 Dec 2023 12:02)      INTERVAL HPI/OVERNIGHT EVENTS:  alert, NAD  doing well on HFNC 40L/55% sp02 98%  npo cp or SOB reported  MEDICATIONS  (STANDING):  atorvastatin 80 milliGRAM(s) Oral at bedtime  budesonide  80 MICROgram(s)/formoterol 4.5 MICROgram(s) Inhaler 2 Puff(s) Inhalation two times a day  dextrose 5%. 1000 milliLiter(s) (50 mL/Hr) IV Continuous <Continuous>  dextrose 5%. 1000 milliLiter(s) (100 mL/Hr) IV Continuous <Continuous>  dextrose 50% Injectable 12.5 Gram(s) IV Push once  dextrose 50% Injectable 25 Gram(s) IV Push once  dextrose 50% Injectable 25 Gram(s) IV Push once  furosemide   Injectable 40 milliGRAM(s) IV Push daily  glucagon  Injectable 1 milliGRAM(s) IntraMuscular once  heparin   Injectable 5000 Unit(s) SubCutaneous every 8 hours  insulin glargine Injectable (LANTUS) 80 Unit(s) SubCutaneous at bedtime  insulin lispro (ADMELOG) corrective regimen sliding scale   SubCutaneous every 6 hours  insulin lispro Injectable (ADMELOG) 30 Unit(s) SubCutaneous three times a day before meals  lactulose Syrup 20 Gram(s) Oral daily  methylPREDNISolone sodium succinate Injectable 40 milliGRAM(s) IV Push every 8 hours  oxyCODONE  ER Tablet 60 milliGRAM(s) Oral every 12 hours  piperacillin/tazobactam IVPB.- 3.375 Gram(s) IV Intermittent once  piperacillin/tazobactam IVPB.. 3.375 Gram(s) IV Intermittent every 8 hours  polyethylene glycol 3350 17 Gram(s) Oral daily  senna 2 Tablet(s) Oral at bedtime  tiotropium 2.5 MICROgram(s) Inhaler 2 Puff(s) Inhalation daily      MEDICATIONS  (PRN):  acetaminophen     Tablet .. 650 milliGRAM(s) Oral every 6 hours PRN Temp greater or equal to 38C (100.4F), Mild Pain (1 - 3)  albuterol    90 MICROgram(s) HFA Inhaler 2 Puff(s) Inhalation every 2 hours PRN Shortness of Breath and/or Wheezing  albuterol/ipratropium for Nebulization 3 milliLiter(s) Nebulizer every 6 hours PRN Bronchospasm  ALPRAZolam 0.25 milliGRAM(s) Oral at bedtime PRN for anxiety/Sleep  dextrose Oral Gel 15 Gram(s) Oral once PRN Blood Glucose LESS THAN 70 milliGRAM(s)/deciliter  diphenhydrAMINE 25 milliGRAM(s) Oral every 6 hours PRN Rash and/or Itching  hydrALAZINE Injectable 10 milliGRAM(s) IV Push every 4 hours PRN for systolic bp > 160  HYDROmorphone  Injectable 0.5 milliGRAM(s) IV Push every 6 hours PRN Severe Pain (7 - 10)  HYDROmorphone  Injectable 1 milliGRAM(s) IV Push every 4 hours PRN breakthrough  pain  melatonin 3 milliGRAM(s) Oral at bedtime PRN Insomnia  ondansetron Injectable 4 milliGRAM(s) IV Push every 8 hours PRN Nausea and/or Vomiting  oxyCODONE    IR 5 milliGRAM(s) Oral every 6 hours PRN Moderate Pain (4 - 6)      Allergies    wool- rash, itch (Other)  adhesives (Rash)  latex (Rash)  Bactrim (Flushing)    Intolerances        PAST MEDICAL & SURGICAL HISTORY:  Diabetes Mellitus Type II      HTN (Hypertension)      Endometrial Hyperplasia      Cervical Stenosis of Spine      Spinal Stenosis, Lumbar      Deep Vein Thrombosis (DVT)  Left leg, 2004, treated and resolved      Dyslipidemia      Cataract      Morbid Obesity      Vitamin D deficiency      Insomnia      CKD (chronic kidney disease)  ~ III      Congestive heart failure  ~ HFpEF      Uterine cancer      Asthma      On home oxygen therapy      Gait difficulty  ~ u ses walker      Cataract extracted with lens implant 1998  Right      C Section 1994      Cholecystectomy/appendectomy @ age 25      D&C x2 1980's      D&C 2008  hysteroscopy, endometrial hyperplasia, 2009      Cervical Spinal Stenosis surgery x2 (01/2002, 7/2002)      Tonsillectomy as a child      Endometrial biopsy 12/02/09      H/O laser iridotomy  left eye, 2016      H/O colonoscopy  1998      S/P total abdominal hysterectomy and bilateral salpingo-oophorectomy      S/P appendectomy      S/P cholecystectomy          SOCIAL HISTORY  Smoking History:       REVIEW OF SYSTEMS:    CONSTITUTIONAL:  No distress    HEENT:  Eyes:  No diplopia or blurred vision. ENT:  No earache, sore throat or runny nose.    CARDIOVASCULAR:  No pressure, squeezing, tightness, heaviness or aching about the chest; no palpitations.    RESPIRATORY:  see HPI    GASTROINTESTINAL:  No nausea, vomiting or diarrhea.    GENITOURINARY:  No dysuria, frequency or urgency.    NEUROLOGIC:  No paresthesias, fasciculations, seizures or weakness.    PSYCHIATRIC:  No disorder of thought or mood.    Vital Signs Last 24 Hrs  T(C): 37.1 (20 Dec 2023 12:00), Max: 37.5 (19 Dec 2023 22:00)  T(F): 98.8 (20 Dec 2023 12:00), Max: 99.5 (19 Dec 2023 22:00)  HR: 87 (20 Dec 2023 14:00) (74 - 90)  BP: 154/75 (20 Dec 2023 14:00) (107/45 - 154/75)  BP(mean): 90 (20 Dec 2023 14:00) (58 - 90)  RR: 18 (20 Dec 2023 14:00) (18 - 20)  SpO2: 99% (20 Dec 2023 14:00) (90% - 100%)    Parameters below as of 20 Dec 2023 14:00  Patient On (Oxygen Delivery Method): nasal cannula, high flow  O2 Flow (L/min): 40  O2 Concentration (%): 50    PHYSICAL EXAMINATION:    GENERAL: The patient is awake and alert in no apparent distress.     HEENT: Head is normocephalic and atraumatic. Extraocular muscles are intact. Mucous membranes are moist.    NECK: Supple.    LUNGS: mod air entry with exp  rhonchi; respirations unlabored    HEART: Regular rate and rhythm without murmur.    ABDOMEN: Soft, nontender, and nondistended.      EXTREMITIES: Without any cyanosis, clubbing, rash, lesions or edema.    NEUROLOGIC: Grossly intact.    LABS:                        14.0   11.67 )-----------( 233      ( 20 Dec 2023 07:40 )             42.3     12-20    136  |  90<L>  |  63.8<H>  ----------------------------<  397<H>  4.0   |  38.0<H>  |  1.61<H>    Ca    9.5      20 Dec 2023 07:40  Mg     2.2     12-20        Urinalysis Basic - ( 20 Dec 2023 07:40 )    Color: x / Appearance: x / SG: x / pH: x  Gluc: 397 mg/dL / Ketone: x  / Bili: x / Urobili: x   Blood: x / Protein: x / Nitrite: x   Leuk Esterase: x / RBC: x / WBC x   Sq Epi: x / Non Sq Epi: x / Bacteria: x      ABG - ( 20 Dec 2023 04:00 )  pH, Arterial: 7.450 pH, Blood: x     /  pCO2: 62    /  pO2: 94    / HCO3: 43    / Base Excess: 19.1  /  SaO2: 97.8                  D-Dimer Assay, Quantitative: <150 ng/mL DDU (12-20-23 @ 07:40)            MICROBIOLOGY:    RADIOLOGY & ADDITIONAL STUDIES: PULMONARY PROGRESS NOTE      [id number]    Patient is a 68y old  Female who presents with a chief complaint of SOB (20 Dec 2023 12:02)      INTERVAL HPI/OVERNIGHT EVENTS:  alert, NAD  doing well on HFNC 40L/55% sp02 98%  npo cp or SOB reported  MEDICATIONS  (STANDING):  atorvastatin 80 milliGRAM(s) Oral at bedtime  budesonide  80 MICROgram(s)/formoterol 4.5 MICROgram(s) Inhaler 2 Puff(s) Inhalation two times a day  dextrose 5%. 1000 milliLiter(s) (50 mL/Hr) IV Continuous <Continuous>  dextrose 5%. 1000 milliLiter(s) (100 mL/Hr) IV Continuous <Continuous>  dextrose 50% Injectable 12.5 Gram(s) IV Push once  dextrose 50% Injectable 25 Gram(s) IV Push once  dextrose 50% Injectable 25 Gram(s) IV Push once  furosemide   Injectable 40 milliGRAM(s) IV Push daily  glucagon  Injectable 1 milliGRAM(s) IntraMuscular once  heparin   Injectable 5000 Unit(s) SubCutaneous every 8 hours  insulin glargine Injectable (LANTUS) 80 Unit(s) SubCutaneous at bedtime  insulin lispro (ADMELOG) corrective regimen sliding scale   SubCutaneous every 6 hours  insulin lispro Injectable (ADMELOG) 30 Unit(s) SubCutaneous three times a day before meals  lactulose Syrup 20 Gram(s) Oral daily  methylPREDNISolone sodium succinate Injectable 40 milliGRAM(s) IV Push every 8 hours  oxyCODONE  ER Tablet 60 milliGRAM(s) Oral every 12 hours  piperacillin/tazobactam IVPB.- 3.375 Gram(s) IV Intermittent once  piperacillin/tazobactam IVPB.. 3.375 Gram(s) IV Intermittent every 8 hours  polyethylene glycol 3350 17 Gram(s) Oral daily  senna 2 Tablet(s) Oral at bedtime  tiotropium 2.5 MICROgram(s) Inhaler 2 Puff(s) Inhalation daily      MEDICATIONS  (PRN):  acetaminophen     Tablet .. 650 milliGRAM(s) Oral every 6 hours PRN Temp greater or equal to 38C (100.4F), Mild Pain (1 - 3)  albuterol    90 MICROgram(s) HFA Inhaler 2 Puff(s) Inhalation every 2 hours PRN Shortness of Breath and/or Wheezing  albuterol/ipratropium for Nebulization 3 milliLiter(s) Nebulizer every 6 hours PRN Bronchospasm  ALPRAZolam 0.25 milliGRAM(s) Oral at bedtime PRN for anxiety/Sleep  dextrose Oral Gel 15 Gram(s) Oral once PRN Blood Glucose LESS THAN 70 milliGRAM(s)/deciliter  diphenhydrAMINE 25 milliGRAM(s) Oral every 6 hours PRN Rash and/or Itching  hydrALAZINE Injectable 10 milliGRAM(s) IV Push every 4 hours PRN for systolic bp > 160  HYDROmorphone  Injectable 0.5 milliGRAM(s) IV Push every 6 hours PRN Severe Pain (7 - 10)  HYDROmorphone  Injectable 1 milliGRAM(s) IV Push every 4 hours PRN breakthrough  pain  melatonin 3 milliGRAM(s) Oral at bedtime PRN Insomnia  ondansetron Injectable 4 milliGRAM(s) IV Push every 8 hours PRN Nausea and/or Vomiting  oxyCODONE    IR 5 milliGRAM(s) Oral every 6 hours PRN Moderate Pain (4 - 6)      Allergies    wool- rash, itch (Other)  adhesives (Rash)  latex (Rash)  Bactrim (Flushing)    Intolerances        PAST MEDICAL & SURGICAL HISTORY:  Diabetes Mellitus Type II      HTN (Hypertension)      Endometrial Hyperplasia      Cervical Stenosis of Spine      Spinal Stenosis, Lumbar      Deep Vein Thrombosis (DVT)  Left leg, 2004, treated and resolved      Dyslipidemia      Cataract      Morbid Obesity      Vitamin D deficiency      Insomnia      CKD (chronic kidney disease)  ~ III      Congestive heart failure  ~ HFpEF      Uterine cancer      Asthma      On home oxygen therapy      Gait difficulty  ~ u ses walker      Cataract extracted with lens implant 1998  Right      C Section 1994      Cholecystectomy/appendectomy @ age 25      D&C x2 1980's      D&C 2008  hysteroscopy, endometrial hyperplasia, 2009      Cervical Spinal Stenosis surgery x2 (01/2002, 7/2002)      Tonsillectomy as a child      Endometrial biopsy 12/02/09      H/O laser iridotomy  left eye, 2016      H/O colonoscopy  1998      S/P total abdominal hysterectomy and bilateral salpingo-oophorectomy      S/P appendectomy      S/P cholecystectomy          SOCIAL HISTORY  Smoking History:       REVIEW OF SYSTEMS:    CONSTITUTIONAL:  No distress    HEENT:  Eyes:  No diplopia or blurred vision. ENT:  No earache, sore throat or runny nose.    CARDIOVASCULAR:  No pressure, squeezing, tightness, heaviness or aching about the chest; no palpitations.    RESPIRATORY:  see HPI    GASTROINTESTINAL:  No nausea, vomiting or diarrhea.    GENITOURINARY:  No dysuria, frequency or urgency.    NEUROLOGIC:  No paresthesias, fasciculations, seizures or weakness.    PSYCHIATRIC:  No disorder of thought or mood.    Vital Signs Last 24 Hrs  T(C): 37.1 (20 Dec 2023 12:00), Max: 37.5 (19 Dec 2023 22:00)  T(F): 98.8 (20 Dec 2023 12:00), Max: 99.5 (19 Dec 2023 22:00)  HR: 87 (20 Dec 2023 14:00) (74 - 90)  BP: 154/75 (20 Dec 2023 14:00) (107/45 - 154/75)  BP(mean): 90 (20 Dec 2023 14:00) (58 - 90)  RR: 18 (20 Dec 2023 14:00) (18 - 20)  SpO2: 99% (20 Dec 2023 14:00) (90% - 100%)    Parameters below as of 20 Dec 2023 14:00  Patient On (Oxygen Delivery Method): nasal cannula, high flow  O2 Flow (L/min): 40  O2 Concentration (%): 50    PHYSICAL EXAMINATION:    GENERAL: The patient is awake and alert in no apparent distress.     HEENT: Head is normocephalic and atraumatic. Extraocular muscles are intact. Mucous membranes are moist.    NECK: Supple.    LUNGS: mod air entry with exp  rhonchi; respirations unlabored    HEART: Regular rate and rhythm without murmur.    ABDOMEN: Soft, nontender, and nondistended.      EXTREMITIES: Without any cyanosis, clubbing, rash, lesions or edema.    NEUROLOGIC: Grossly intact.    LABS:                        14.0   11.67 )-----------( 233      ( 20 Dec 2023 07:40 )             42.3     12-20    136  |  90<L>  |  63.8<H>  ----------------------------<  397<H>  4.0   |  38.0<H>  |  1.61<H>    Ca    9.5      20 Dec 2023 07:40  Mg     2.2     12-20        Urinalysis Basic - ( 20 Dec 2023 07:40 )    Color: x / Appearance: x / SG: x / pH: x  Gluc: 397 mg/dL / Ketone: x  / Bili: x / Urobili: x   Blood: x / Protein: x / Nitrite: x   Leuk Esterase: x / RBC: x / WBC x   Sq Epi: x / Non Sq Epi: x / Bacteria: x      ABG - ( 20 Dec 2023 04:00 )  pH, Arterial: 7.450 pH, Blood: x     /  pCO2: 62    /  pO2: 94    / HCO3: 43    / Base Excess: 19.1  /  SaO2: 97.8                  D-Dimer Assay, Quantitative: <150 ng/mL DDU (12-20-23 @ 07:40)            MICROBIOLOGY:    RADIOLOGY & ADDITIONAL STUDIES:

## 2023-12-20 NOTE — PROGRESS NOTE ADULT - ASSESSMENT
67 yo female with HTN, HLD, DM2, HFpEF, CKD III, DVT, Uterine CA, COPD on home o2 who presented with complaints of shortness of breath. Patient reports that she has been sick all week and was recently started on Vantin and steroids while at the nursing home. Patient reports that her dyspnea just worsened and she told the nursing home to send her in for an evaluation. While in the ED, patient was placed on BIPAP and was given IV lasix with some improvement. RVP then results as RSV +. Patient had a CT scan which also showed pneumonia and pulmonary HTN. Admission to medicine was requested for further management.    #Acute on chronic respiratory failure/ likely multifactorial   #Acute HFpEF exacerbation  # likely with Superimposed bacterial Pneumonia in setting of RSV  #Pulmonary hypertension  ct noted with rt lung pna   BNP elevated  +RSV  c/w madeline Seay dc as legionella negative  IV steroids  f/u Blood cx, sputum cx  c/w IV Lasix QD  Cardiology following, plan for cath when respiratory status better  BIPAP- wean as tolerated   pulm following   If pt remains BIPAP dependent, will consult ICU - d/w patient, agreeable to intubation if needed       #Chronic pain  continue home Pain meds of Oxycodone 60 mg q12h and Oxycodone 5mg q6h PRN    #hx of suspicion for PE.DVT  Patient has empirically been treated for PE/DVT since August  No scans noted to have PE/DVT  duplex lower ext to without dvt   c/w ppx HSQ    #HLD  Atorvastatin for rosuvastatin    #COPD  Steroids as above  Symbicort, Spiriva, albuterol  pulm consulted     #DM  Increase insulin to Lantus 80 + 25 u premeals  endo consulted   a1c 8.3    DVT prophylaxis: HSQ  Dispo: pending resp status optimization, weaning off BIPAP, glucose monitoring, TTE and eventual cath

## 2023-12-20 NOTE — PROGRESS NOTE ADULT - SUBJECTIVE AND OBJECTIVE BOX
seen for resp failure      feel sbetter but still on HFNC  tolerating diet  ros negative     MEDICATIONS  (STANDING):  atorvastatin 80 milliGRAM(s) Oral at bedtime  budesonide  80 MICROgram(s)/formoterol 4.5 MICROgram(s) Inhaler 2 Puff(s) Inhalation two times a day  dextrose 5%. 1000 milliLiter(s) (100 mL/Hr) IV Continuous <Continuous>  dextrose 5%. 1000 milliLiter(s) (50 mL/Hr) IV Continuous <Continuous>  dextrose 50% Injectable 25 Gram(s) IV Push once  dextrose 50% Injectable 12.5 Gram(s) IV Push once  dextrose 50% Injectable 25 Gram(s) IV Push once  furosemide   Injectable 40 milliGRAM(s) IV Push daily  glucagon  Injectable 1 milliGRAM(s) IntraMuscular once  heparin   Injectable 5000 Unit(s) SubCutaneous every 8 hours  insulin glargine Injectable (LANTUS) 80 Unit(s) SubCutaneous at bedtime  insulin lispro (ADMELOG) corrective regimen sliding scale   SubCutaneous every 6 hours  insulin lispro Injectable (ADMELOG) 25 Unit(s) SubCutaneous three times a day before meals  lactulose Syrup 20 Gram(s) Oral daily  methylPREDNISolone sodium succinate Injectable 40 milliGRAM(s) IV Push every 8 hours  oxyCODONE  ER Tablet 60 milliGRAM(s) Oral every 12 hours  piperacillin/tazobactam IVPB.- 3.375 Gram(s) IV Intermittent once  piperacillin/tazobactam IVPB.. 3.375 Gram(s) IV Intermittent every 8 hours  polyethylene glycol 3350 17 Gram(s) Oral daily  senna 2 Tablet(s) Oral at bedtime  tiotropium 2.5 MICROgram(s) Inhaler 2 Puff(s) Inhalation daily    MEDICATIONS  (PRN):  acetaminophen     Tablet .. 650 milliGRAM(s) Oral every 6 hours PRN Temp greater or equal to 38C (100.4F), Mild Pain (1 - 3)  albuterol    90 MICROgram(s) HFA Inhaler 2 Puff(s) Inhalation every 2 hours PRN Shortness of Breath and/or Wheezing  albuterol/ipratropium for Nebulization 3 milliLiter(s) Nebulizer every 6 hours PRN Bronchospasm  ALPRAZolam 0.25 milliGRAM(s) Oral at bedtime PRN for anxiety/Sleep  dextrose Oral Gel 15 Gram(s) Oral once PRN Blood Glucose LESS THAN 70 milliGRAM(s)/deciliter  diphenhydrAMINE 25 milliGRAM(s) Oral every 6 hours PRN Rash and/or Itching  hydrALAZINE Injectable 10 milliGRAM(s) IV Push every 4 hours PRN for systolic bp > 160  HYDROmorphone  Injectable 0.5 milliGRAM(s) IV Push every 6 hours PRN Severe Pain (7 - 10)  HYDROmorphone  Injectable 1 milliGRAM(s) IV Push every 4 hours PRN breakthrough  pain  melatonin 3 milliGRAM(s) Oral at bedtime PRN Insomnia  ondansetron Injectable 4 milliGRAM(s) IV Push every 8 hours PRN Nausea and/or Vomiting  oxyCODONE    IR 5 milliGRAM(s) Oral every 6 hours PRN Moderate Pain (4 - 6)      Allergies    wool- rash, itch (Other)  adhesives (Rash)  latex (Rash)  Bactrim (Flushing)      Vital Signs Last 24 Hrs  T(C): 36.8 (20 Dec 2023 08:00), Max: 37.5 (19 Dec 2023 22:00)  T(F): 98.2 (20 Dec 2023 08:00), Max: 99.5 (19 Dec 2023 22:00)  HR: 87 (20 Dec 2023 10:00) (74 - 90)  BP: 147/71 (20 Dec 2023 10:00) (107/45 - 147/71)  BP(mean): 88 (20 Dec 2023 10:00) (58 - 88)  RR: 20 (20 Dec 2023 10:00) (18 - 22)  SpO2: 97% (20 Dec 2023 10:00) (90% - 100%)    Parameters below as of 20 Dec 2023 10:00  Patient On (Oxygen Delivery Method): nasal cannula, high flow  O2 Flow (L/min): 40  O2 Concentration (%): 55    PHYSICAL EXAM:    GENERAL: NAD obese   CHEST/LUNG: diffuse wheezing, mainly on right   HEART: Regular rate and rhythm; S1 S2  ABDOMEN: Soft, Nontender,  Bowel sounds present  EXTREMITIES:  lymphedema   NERVOUS SYSTEM:  Alert & Oriented X3, gen weakness    LABS:                        14.0   11.67 )-----------( 233      ( 20 Dec 2023 07:40 )             42.3     12-20    136  |  90<L>  |  63.8<H>  ----------------------------<  397<H>  4.0   |  38.0<H>  |  1.61<H>    Ca    9.5      20 Dec 2023 07:40  Mg     2.2     12-20        Urinalysis Basic - ( 20 Dec 2023 07:40 )    Color: x / Appearance: x / SG: x / pH: x  Gluc: 397 mg/dL / Ketone: x  / Bili: x / Urobili: x   Blood: x / Protein: x / Nitrite: x   Leuk Esterase: x / RBC: x / WBC x   Sq Epi: x / Non Sq Epi: x / Bacteria: x        CAPILLARY BLOOD GLUCOSE      POCT Blood Glucose.: 356 mg/dL (20 Dec 2023 08:22)  POCT Blood Glucose.: 401 mg/dL (20 Dec 2023 06:22)  POCT Blood Glucose.: 434 mg/dL (19 Dec 2023 22:59)  POCT Blood Glucose.: 363 mg/dL (19 Dec 2023 18:01)  POCT Blood Glucose.: 383 mg/dL (19 Dec 2023 12:58)        RADIOLOGY & ADDITIONAL TESTS:   [facility]    Patient:  Kisney Ayala  YOB: 1971  Date of Service: 9/13/2020  MRN: [id number]   Acct: [de-identified]   Primary Care Physician: No primary care provider on file. Advance Directive: Full Code  Admit Date: 9/11/2020       Hospital Day: 2  Portions of this note have been copied forward, however, changed to reflect the most current clinical status of this patient. CHIEF COMPLAINT: Alcohol intoxication     SUBJECTIVE: Mr. Emperatriz Sanches has no new complaints. Wants to go home soon. Denies chest pain, heart palpitations. Less tremulous. Cumulative Hospital Course: The patient is a 51 y.o. male with PMH CAD s/p PCI to LAD in 2012, Seizures, HTN, GERD, HLD, depression  who presented to [facility] ED complaining of alcohol intoxication, depression and suicidal ideation. Mr. Emperatriz Sanches states he's been drinking alcohol daily since the age of 11. Previously drank a 30 case of beer then weaned himself to a pint and a half of whiskey daily and now he states he's Kacy Dayami been drinking a pint\" every day. He smokes about 2 PPD. Tells me he wants to quit drinking because he knows it's going to kill him. States he feels depressed but denies suicidal/homicidal ideation. Additionally he does have history of a seizure disorder. He cannot tell me what medication he takes for this and has not seen a Neurologist \"in years\". Ethanol level is 332. Mr. Emperatriz Sanches was admitted to Hospitalist service and placed on CIWA protocol as well as Librium taper. Review of Systems:   14 point review of systems is negative except as specifically addressed above.     Objective:   VITALS:  BP (!) 168/98 Comment: manual  Pulse 88   Temp 98.7 °F (37.1 °C) (Temporal)   Resp 18   Ht 6' (1.829 m)   Wt 278 lb (126.1 kg)   SpO2 99%   BMI 37.70 kg/m²   24HR INTAKE/OUTPUT:      Intake/Output Summary (Last 24 hours) at 9/13/2020 1605  Last data filed at 9/13/2020 1439  Gross per 24 hour   Intake 4281 ml   Output --   Net 4281 ml General appearance: 51year old male, asleep in left lateral recumbent position when I enter the room, but easily awakened, no acute distress  Head: Normocephalic, without obvious abnormality, atraumatic  Eyes: conjunctivae/corneas clear. PERRL, EOM's intact. Ears: normal external ears and nose, throat without exudate  Neck: no adenopathy, no carotid bruit, no JVD, supple, symmetrical, trachea midline   Lungs: Coarse air entry without wheezes, rales or rhonchi  Heart: regular rate, regular rhythm, S1, S2 normal, no murmur  Abdomen: soft, non-tender; non-distended, normal bowel sounds no masses, no organomegaly  Extremities:No lower extremity edema,  No erythema, no tenderness to palpation  Skin: Skin color, texture, turgor normal. No rashes or lesions  Lymphatic: No palpable lymph node enlargment  Neurologic: Alert and oriented X 3, normal strength and tone.  No focal deficits  Psychiatric: Calm, appropriate affect    Medications:      sodium chloride 125 mL/hr at 09/12/20 0215      metoprolol succinate  50 mg Oral Daily    isosorbide mononitrate  30 mg Oral Daily    amLODIPine  10 mg Oral Daily    atorvastatin  10 mg Oral Daily    clopidogrel  75 mg Oral Daily    lisinopril  40 mg Oral Daily    OLANZapine  5 mg Oral Nightly    chlordiazePOXIDE  25 mg Oral TID    sodium chloride flush  10 mL Intravenous 2 times per day    enoxaparin  40 mg Subcutaneous Q24H    thiamine  100 mg Oral Daily    multivitamin  1 tablet Oral Daily    nicotine  1 patch Transdermal Daily    folic acid  1 mg Oral Daily    pantoprazole  40 mg Oral QAM AC     diazePAM, HYDROcodone-acetaminophen, labetalol, hydrALAZINE, sodium chloride flush, acetaminophen **OR** acetaminophen, polyethylene glycol, promethazine **OR** ondansetron, potassium chloride **OR** potassium alternative oral replacement **OR** potassium chloride, magnesium sulfate, LORazepam **OR** LORazepam **OR** LORazepam **OR** LORazepam **OR** LORazepam **OR**

## 2023-12-20 NOTE — PROGRESS NOTE ADULT - SUBJECTIVE AND OBJECTIVE BOX
[facility]                                                         CARDIOLOGY AT [place] [address]                                                         Telephone: [phone]. Fax:[phone]                                                                             PROGRESS NOTE    Reason for follow up: RSV +. HFpEF   Update: Patient is currently off bipap and on HFNC 40L/ 60%.        Review of symptoms:   Cardiac:  No chest pain. No dyspnea. No palpitations.  Respiratory: no cough. No dyspnea  Gastrointestinal: No diarrhea. No abdominal pain. No bleeding.   Neuro: No focal neuro complaints.    Vitals:  T(C): 36.8 (12-20-23 @ 08:00), Max: 37.5 (12-19-23 @ 22:00)  HR: 81 (12-20-23 @ 09:30) (74 - 90)  BP: 128/57 (12-20-23 @ 08:00) (107/45 - 140/100)  RR: 18 (12-20-23 @ 08:00) (18 - 22)  SpO2: 96% (12-20-23 @ 09:30) (90% - 100%)  Wt(kg): --  I&O's Summary    19 Dec 2023 07:01  -  20 Dec 2023 07:00  --------------------------------------------------------  IN: 625 mL / OUT: 3600 mL / NET: -2975 mL    20 Dec 2023 07:01  -  20 Dec 2023 10:10  --------------------------------------------------------  IN: 0 mL / OUT: 900 mL / NET: -900 mL      Weight (kg): 177.4 (12-16 @ 10:02)    PHYSICAL EXAM:  Appearance: Comfortable. No acute distress. Super mobridly obese   HEENT:  Atraumatic. Normocephalic.  Normal oral mucosa  Neurologic: A & O x 3, no gross focal deficits.  Cardiovascular: RRR S1 S2, No murmur, no rubs/gallops. No JVD  Respiratory: Lung sounds coarse bilaterally HFNC  unlabored   Gastrointestinal:  Soft, Non-tender, + BS  Lower Extremities: 2+ Peripheral Pulses, No clubbing, cyanosis. +2 B/L LE edema  Psychiatry: Patient is calm. No agitation.   Skin: warm and dry.    CURRENT CARDIAC MEDICATIONS:  furosemide   Injectable 40 milliGRAM(s) IV Push daily  hydrALAZINE Injectable 10 milliGRAM(s) IV Push every 4 hours PRN      CURRENT OTHER MEDICATIONS:  albuterol    90 MICROgram(s) HFA Inhaler 2 Puff(s) Inhalation every 2 hours PRN Shortness of Breath and/or Wheezing  albuterol/ipratropium for Nebulization 3 milliLiter(s) Nebulizer every 6 hours PRN Bronchospasm  budesonide  80 MICROgram(s)/formoterol 4.5 MICROgram(s) Inhaler 2 Puff(s) Inhalation two times a day  tiotropium 2.5 MICROgram(s) Inhaler 2 Puff(s) Inhalation daily  piperacillin/tazobactam IVPB.- 3.375 Gram(s) IV Intermittent once  piperacillin/tazobactam IVPB.. 3.375 Gram(s) IV Intermittent every 8 hours  acetaminophen     Tablet .. 650 milliGRAM(s) Oral every 6 hours PRN Temp greater or equal to 38C (100.4F), Mild Pain (1 - 3)  ALPRAZolam 0.25 milliGRAM(s) Oral at bedtime PRN for anxiety/Sleep  diphenhydrAMINE 25 milliGRAM(s) Oral every 6 hours PRN Rash and/or Itching  HYDROmorphone  Injectable 1 milliGRAM(s) IV Push every 4 hours PRN breakthrough  pain  HYDROmorphone  Injectable 0.5 milliGRAM(s) IV Push every 6 hours PRN Severe Pain (7 - 10)  melatonin 3 milliGRAM(s) Oral at bedtime PRN Insomnia  ondansetron Injectable 4 milliGRAM(s) IV Push every 8 hours PRN Nausea and/or Vomiting  oxyCODONE    IR 5 milliGRAM(s) Oral every 6 hours PRN Moderate Pain (4 - 6)  oxyCODONE  ER Tablet 60 milliGRAM(s) Oral every 12 hours  lactulose Syrup 20 Gram(s) Oral daily  polyethylene glycol 3350 17 Gram(s) Oral daily  senna 2 Tablet(s) Oral at bedtime  atorvastatin 80 milliGRAM(s) Oral at bedtime  dextrose 50% Injectable 12.5 Gram(s) IV Push once, Stop order after: 1 Doses  dextrose 50% Injectable 25 Gram(s) IV Push once, Stop order after: 1 Doses  dextrose 50% Injectable 25 Gram(s) IV Push once, Stop order after: 1 Doses  dextrose Oral Gel 15 Gram(s) Oral once, Stop order after: 1 Doses PRN Blood Glucose LESS THAN 70 milliGRAM(s)/deciliter  glucagon  Injectable 1 milliGRAM(s) IntraMuscular once, Stop order after: 1 Doses  insulin glargine Injectable (LANTUS) 80 Unit(s) SubCutaneous at bedtime  insulin lispro (ADMELOG) corrective regimen sliding scale   SubCutaneous every 6 hours  insulin lispro Injectable (ADMELOG) 25 Unit(s) SubCutaneous three times a day before meals  methylPREDNISolone sodium succinate Injectable 40 milliGRAM(s) IV Push every 8 hours  dextrose 5%. 1000 milliLiter(s) (50 mL/Hr) IV Continuous <Continuous>  dextrose 5%. 1000 milliLiter(s) (100 mL/Hr) IV Continuous <Continuous>  heparin   Injectable 5000 Unit(s) SubCutaneous every 8 hours      LABS:	 	                            14.0   11.67 )-----------( 233      ( 20 Dec 2023 07:40 )             42.3     12-20    136  |  90<L>  |  63.8<H>  ----------------------------<  397<H>  4.0   |  38.0<H>  |  1.61<H>    Ca    9.5      20 Dec 2023 07:40  Mg     2.2     12-20      PT/INR/PTT ( 16 Dec 2023 10:18 )                       :                       :      14.6         :       31.7                  .        .                   .              .           .       1.33        .                                       Lipid Profile: Date: 12-17 @ 08:20  Total cholesterol 140; Direct LDL: --; HDL: 70; Triglycerides:133    HgA1c:   TSH: Thyroid Stimulating Hormone, Serum: 1.51 uIU/mL  Thyroid Stimulating Hormone, Serum: 3.02 uIU/mL      TELEMETRY: NSR with PVCs   ECG: NSR     DIAGNOSTIC TESTING:  [x ] Echocardiogram: < from: TTE W or WO Ultrasound Enhancing Agent (12.18.23 @ 13:36) >   1. Technically difficult image quality.   2. Normal biventricular systolic function.   3. Compared to the transthoracic echocardiogram performed on 8/31/2023.    < end of copied text >    [ ]  Catheterization:  [ ] Stress Test:    OTHER: 	                                                                [facility]                                                         CARDIOLOGY AT [place] [address]                                                         Telephone: [phone]. Fax:[phone]                                                                             PROGRESS NOTE    Reason for follow up: RSV +. HFpEF   Update: Patient is currently off bipap and on HFNC 40L/ 60%.        Review of symptoms:   Cardiac:  No chest pain. No dyspnea. No palpitations.  Respiratory: no cough. No dyspnea  Gastrointestinal: No diarrhea. No abdominal pain. No bleeding.   Neuro: No focal neuro complaints.    Vitals:  T(C): 36.8 (12-20-23 @ 08:00), Max: 37.5 (12-19-23 @ 22:00)  HR: 81 (12-20-23 @ 09:30) (74 - 90)  BP: 128/57 (12-20-23 @ 08:00) (107/45 - 140/100)  RR: 18 (12-20-23 @ 08:00) (18 - 22)  SpO2: 96% (12-20-23 @ 09:30) (90% - 100%)  Wt(kg): --  I&O's Summary    19 Dec 2023 07:01  -  20 Dec 2023 07:00  --------------------------------------------------------  IN: 625 mL / OUT: 3600 mL / NET: -2975 mL    20 Dec 2023 07:01  -  20 Dec 2023 10:10  --------------------------------------------------------  IN: 0 mL / OUT: 900 mL / NET: -900 mL      Weight (kg): 177.4 (12-16 @ 10:02)    PHYSICAL EXAM:  Appearance: Comfortable. No acute distress. Super mobridly obese   HEENT:  Atraumatic. Normocephalic.  Normal oral mucosa  Neurologic: A & O x 3, no gross focal deficits.  Cardiovascular: RRR S1 S2, No murmur, no rubs/gallops. No JVD  Respiratory: Lung sounds coarse bilaterally HFNC  unlabored   Gastrointestinal:  Soft, Non-tender, + BS  Lower Extremities: 2+ Peripheral Pulses, No clubbing, cyanosis. +2 B/L LE edema  Psychiatry: Patient is calm. No agitation.   Skin: warm and dry.    CURRENT CARDIAC MEDICATIONS:  furosemide   Injectable 40 milliGRAM(s) IV Push daily  hydrALAZINE Injectable 10 milliGRAM(s) IV Push every 4 hours PRN      CURRENT OTHER MEDICATIONS:  albuterol    90 MICROgram(s) HFA Inhaler 2 Puff(s) Inhalation every 2 hours PRN Shortness of Breath and/or Wheezing  albuterol/ipratropium for Nebulization 3 milliLiter(s) Nebulizer every 6 hours PRN Bronchospasm  budesonide  80 MICROgram(s)/formoterol 4.5 MICROgram(s) Inhaler 2 Puff(s) Inhalation two times a day  tiotropium 2.5 MICROgram(s) Inhaler 2 Puff(s) Inhalation daily  piperacillin/tazobactam IVPB.- 3.375 Gram(s) IV Intermittent once  piperacillin/tazobactam IVPB.. 3.375 Gram(s) IV Intermittent every 8 hours  acetaminophen     Tablet .. 650 milliGRAM(s) Oral every 6 hours PRN Temp greater or equal to 38C (100.4F), Mild Pain (1 - 3)  ALPRAZolam 0.25 milliGRAM(s) Oral at bedtime PRN for anxiety/Sleep  diphenhydrAMINE 25 milliGRAM(s) Oral every 6 hours PRN Rash and/or Itching  HYDROmorphone  Injectable 1 milliGRAM(s) IV Push every 4 hours PRN breakthrough  pain  HYDROmorphone  Injectable 0.5 milliGRAM(s) IV Push every 6 hours PRN Severe Pain (7 - 10)  melatonin 3 milliGRAM(s) Oral at bedtime PRN Insomnia  ondansetron Injectable 4 milliGRAM(s) IV Push every 8 hours PRN Nausea and/or Vomiting  oxyCODONE    IR 5 milliGRAM(s) Oral every 6 hours PRN Moderate Pain (4 - 6)  oxyCODONE  ER Tablet 60 milliGRAM(s) Oral every 12 hours  lactulose Syrup 20 Gram(s) Oral daily  polyethylene glycol 3350 17 Gram(s) Oral daily  senna 2 Tablet(s) Oral at bedtime  atorvastatin 80 milliGRAM(s) Oral at bedtime  dextrose 50% Injectable 12.5 Gram(s) IV Push once, Stop order after: 1 Doses  dextrose 50% Injectable 25 Gram(s) IV Push once, Stop order after: 1 Doses  dextrose 50% Injectable 25 Gram(s) IV Push once, Stop order after: 1 Doses  dextrose Oral Gel 15 Gram(s) Oral once, Stop order after: 1 Doses PRN Blood Glucose LESS THAN 70 milliGRAM(s)/deciliter  glucagon  Injectable 1 milliGRAM(s) IntraMuscular once, Stop order after: 1 Doses  insulin glargine Injectable (LANTUS) 80 Unit(s) SubCutaneous at bedtime  insulin lispro (ADMELOG) corrective regimen sliding scale   SubCutaneous every 6 hours  insulin lispro Injectable (ADMELOG) 25 Unit(s) SubCutaneous three times a day before meals  methylPREDNISolone sodium succinate Injectable 40 milliGRAM(s) IV Push every 8 hours  dextrose 5%. 1000 milliLiter(s) (50 mL/Hr) IV Continuous <Continuous>  dextrose 5%. 1000 milliLiter(s) (100 mL/Hr) IV Continuous <Continuous>  heparin   Injectable 5000 Unit(s) SubCutaneous every 8 hours      LABS:	 	                            14.0   11.67 )-----------( 233      ( 20 Dec 2023 07:40 )             42.3     12-20    136  |  90<L>  |  63.8<H>  ----------------------------<  397<H>  4.0   |  38.0<H>  |  1.61<H>    Ca    9.5      20 Dec 2023 07:40  Mg     2.2     12-20      PT/INR/PTT ( 16 Dec 2023 10:18 )                       :                       :      14.6         :       31.7                  .        .                   .              .           .       1.33        .                                       Lipid Profile: Date: 12-17 @ 08:20  Total cholesterol 140; Direct LDL: --; HDL: 70; Triglycerides:133    HgA1c:   TSH: Thyroid Stimulating Hormone, Serum: 1.51 uIU/mL  Thyroid Stimulating Hormone, Serum: 3.02 uIU/mL      TELEMETRY: NSR with PVCs   ECG: NSR     DIAGNOSTIC TESTING:  [x ] Echocardiogram: < from: TTE W or WO Ultrasound Enhancing Agent (12.18.23 @ 13:36) >   1. Technically difficult image quality.   2. Normal biventricular systolic function.   3. Compared to the transthoracic echocardiogram performed on 8/31/2023.    < end of copied text >    [ ]  Catheterization:  [ ] Stress Test:    OTHER:

## 2023-12-20 NOTE — PROGRESS NOTE ADULT - NUTRITIONAL ASSESSMENT
This patient has been assessed with a concern for Malnutrition and has been determined to have a diagnosis/diagnoses of Morbid obesity (BMI > 40).    This patient is being managed with:   Diet Consistent Carbohydrate w/Evening Snack-  1500mL Fluid Restriction ([id number])  Low Sodium  Entered: Dec 19 2023  6:23PM   This patient has been assessed with a concern for Malnutrition and has been determined to have a diagnosis/diagnoses of Morbid obesity (BMI > 40).    This patient is being managed with:   Diet Consistent Carbohydrate w/Evening Snack-  1500mL Fluid Restriction ([id number])  Low Sodium  Entered: Dec 19 2023  6:23PM

## 2023-12-20 NOTE — PROGRESS NOTE ADULT - NS ATTEND AMEND GEN_ALL_CORE FT
seen with above,    68F super morbid obesity (BMI >60), ENRIQUE/OHS, chronic respiratory failure, severe pulmonary HTN with RV dysfunction, CKD 3, HFpEF, recent admission 9/2023 with hypoxic respiratory failure unable to get CTPA or VQ scan and was empirically treated with Eliquis now readmitted for acute on chronic hypoxemic respiratory failure on treatment with IV diuresis and empiric Zosyn with Solumedrol, +RSV as well    -was on nasal BiPAP, gradual improvement in respiratory status, repeat weight <400 lbs  -diuresing well on IV Lasix 40mg once daily, Cr. stable, continue to aim for net negative output (prior admission Cr. peak at 2.1), would hold dose today given planning for possible R/LHC tomorrow, may need addition of acetazolamide if further uptrend in bicarb  -prior SBP 90s, off amlodipine for now, may consider spironolactone or Entresto after cath if Cr. stable  -ABG with chronic CO2 retention, she is awake/alert and conversive, off BiPAP today and on HFNC  -discontinued empiric Eliquis, normal D-dimer, need DVT ppx with heparin subQ   -need weight loss         Lenard Van DO, [facility]  Faculty Non-Invasive Cardiologist  [phone] seen with above,    68F super morbid obesity (BMI >60), ENRIQUE/OHS, chronic respiratory failure, severe pulmonary HTN with RV dysfunction, CKD 3, HFpEF, recent admission 9/2023 with hypoxic respiratory failure unable to get CTPA or VQ scan and was empirically treated with Eliquis now readmitted for acute on chronic hypoxemic respiratory failure on treatment with IV diuresis and empiric Zosyn with Solumedrol, +RSV as well    -was on nasal BiPAP, gradual improvement in respiratory status, repeat weight <400 lbs  -diuresing well on IV Lasix 40mg once daily, Cr. stable, continue to aim for net negative output (prior admission Cr. peak at 2.1), would hold dose today given planning for possible R/LHC tomorrow, may need addition of acetazolamide if further uptrend in bicarb  -prior SBP 90s, off amlodipine for now, may consider spironolactone or Entresto after cath if Cr. stable  -ABG with chronic CO2 retention, she is awake/alert and conversive, off BiPAP today and on HFNC  -discontinued empiric Eliquis, normal D-dimer, need DVT ppx with heparin subQ   -need weight loss         Lenard Van DO, [facility]  Faculty Non-Invasive Cardiologist  [phone]

## 2023-12-20 NOTE — PROGRESS NOTE ADULT - ASSESSMENT
67 y/o female with hx of HFpEF (EF 55-60% 8/2023), on home O2 4L, pulmonary HTN, On eliquis for  presumed PE and severe pulmonary hypertension (PASP of 70 mmHg).. morbid obesity, asthma, CKD3, IDDM2, uterine cancer s/p MEGAN, DVT LLE (2004), chronic leg edema 2/2 venous stasis, spinal stenosis with chronic back pain who presents with midsternal chest pressure and shortness of breath.  Probnp 1650  trop 45 (flat curve)  Ekg no ischemic changes TTE EF 60% grade 2 dd moderated reduced Rv systolic function rsvp 70mmhg  CXR with chf     69 y/o female with hx of HFpEF (EF 55-60% 8/2023), on home O2 4L, pulmonary HTN, On eliquis for  presumed PE and severe pulmonary hypertension (PASP of 70 mmHg).. morbid obesity, asthma, CKD3, IDDM2, uterine cancer s/p MEGAN, DVT LLE (2004), chronic leg edema 2/2 venous stasis, spinal stenosis with chronic back pain who presents with midsternal chest pressure and shortness of breath.  Probnp 1650  trop 45 (flat curve)  Ekg no ischemic changes TTE EF 60% grade 2 dd moderated reduced Rv systolic function rsvp 70mmhg  CXR with chf     67 y/o female with hx of HFpEF (EF 55-60% 8/2023), on home O2 4L, pulmonary HTN, was on Eliquis for presumed PE and severe pulmonary hypertension (PASP of 70 mmHg). super morbid obesity, asthma, CKD3, IDDM2, uterine cancer s/p MEGAN, DVT LLE (2004), chronic leg edema 2/2 venous stasis, spinal stenosis with chronic back pain who presents with midsternal chest pressure and shortness of breath.  Probnp 1650  trop 45 (flat curve)  Ekg no ischemic changes TTE EF 60% grade 2 dd moderated reduced Rv systolic function rsvp 70mmhg  CXR with chf

## 2023-12-20 NOTE — PROGRESS NOTE ADULT - PROBLEM SELECTOR PLAN 1
- Pt is still fluid overloaded   - BUN/creatinine still elevated. Monitor BMP daily   - GDMT: C/w lasix 40 mg BID IV push. ACEi/ARBs on hold due to JACQUES.   - Currently hypotensive, amlodipine on hold. Will consider spironolactone or entresto upon d/c and once SBP is above 100.   - Monitor on telemetry.  Strict i/o and daily weights.  Keep K > 4, Mg > 2.  Monitor renal function with ongoing diuresis.   - Plan for LHC/RHC tomorrow if pt is able to lie flat and if JACQUES improves. Actual bed weight is 176.8 kg.   - Eliquis is on hold, D-Dimer is <150. - Pt is still fluid overloaded   - BUN/creatinine still elevated. Monitor BMP daily   - GDMT: C/w lasix 40 mg BID IV push. ACEi/ARBs on hold due to JACQUES.   - Currently hypotensive, amlodipine on hold. Will consider spironolactone or entresto upon d/c and once SBP is above 100.   - Monitor on telemetry.  Strict i/o and daily weights.  Keep K > 4, Mg > 2.  Monitor renal function with ongoing diuresis.   - Plan for LHC/RHC tomorrow if pt is able to lie flat and if JACQUES improves. Actual bed weight is 176.8 kg.   - Eliquis is on hold, D-Dimer is <150. Heparin subcutaneous for DVT ppx. - Pt is still fluid overloaded   - BUN/creatinine still elevated. Monitor BMP daily   - GDMT: C/w lasix 40 mg BID IV push. ACEi/ARBs on hold due to JACQUES.   - Currently hypotensive, amlodipine on hold. Will consider spironolactone or entresto upon d/c and once SBP is above 100.   - Monitor on telemetry.  Strict i/o and daily weights.  Keep K > 4, Mg > 2.  Monitor renal function with ongoing diuresis.   - Plan for LHC/RHC tomorrow if pt is able to lie flat and if JACQUES improves. NPO after midnight tonight.   - Actual bed weight is 176.8 kg.   - Eliquis is on hold, D-Dimer is <150. Heparin subcutaneous for DVT ppx.

## 2023-12-20 NOTE — PHYSICAL THERAPY INITIAL EVALUATION ADULT - PERTINENT HX OF CURRENT PROBLEM, REHAB EVAL
pt presents to [facility] with reports of worsening SOB pt presents to [facility] with reports of worsening SOB

## 2023-12-20 NOTE — CONSULT NOTE ADULT - SUBJECTIVE AND OBJECTIVE BOX
Patient is a 68y old  Female who presents with a chief complaint of SOB     HPI:  69 yo female with HTN, HLD, DM2, HFpEF, CKD III, DVT, Uterine CA, COPD on home o2 who presented with complaints of shortness of breath. Patient reports that she has been sick all week and was recently started on Vantin and steroids while at the nursing home. Patient reports that her dyspnea just worsened and she told the nursing home to send her in for an evaluation. While in the ED, patient was placed on BIPAP and was given IV lasix with some improvement. RVP then results as RSV +. Patient had a CT scan which also showed pneumonia and pulmonary HTN. Started on high dose steroids. Hasd h/o DM, a1c 8.3%, at home on insulin, lantus 35 units daily and lispro 20 units tid with meals. Here needing high doses of insulin. endo consulted for same.      PAST MEDICAL & SURGICAL HISTORY:  Diabetes Mellitus Type II    HTN (Hypertension)    Endometrial Hyperplasia    Cervical Stenosis of Spine    Spinal Stenosis, Lumbar    Deep Vein Thrombosis (DVT)  Left leg, 2004, treated and resolved    Dyslipidemia    Cataract    Morbid Obesity    Vitamin D deficiency    Insomnia    CKD (chronic kidney disease)  ~ III    Congestive heart failure  ~ HFpEF    Uterine cancer    Asthma    On home oxygen therapy    Gait difficulty  ~ u ses walker    Cataract extracted with lens implant 1998  Right    C Section 1994    Cholecystectomy/appendectomy @ age 25    D&C x2 1980's    D&C 2008  hysteroscopy, endometrial hyperplasia, 2009    Cervical Spinal Stenosis surgery x2 (01/2002, 7/2002)    Tonsillectomy as a child    Endometrial biopsy 12/02/09    H/O laser iridotomy  left eye, 2016    H/O colonoscopy  1998    S/P total abdominal hysterectomy and bilateral salpingo-oophorectomy    S/P appendectomy    S/P cholecystectomy        Social History:  Does not smoke, drink or use illicit substances (16 Dec 2023 16:12)      FAMILY HISTORY:  Family history of pancreatic cancer    Family history of bladder cancer    Family history of lung cancer (Grandparent)          Allergies    wool- rash, itch (Other)  adhesives (Rash)  latex (Rash)  Bactrim (Flushing)    Intolerances        REVIEW OF SYSTEMS:    CONSTITUTIONAL: No fever, weight loss, or fatigue  EYES: No eye pain, visual disturbances, or discharge  ENMT:  No difficulty hearing, tinnitus, vertigo; No sinus or throat pain  NECK: No pain or stiffness  RESPIRATORY: + cough, no  hemoptysis; + shortness of breath  CARDIOVASCULAR: No chest pain, palpitations, dizziness, or leg swelling  GASTROINTESTINAL: No abdominal or epigastric pain. No nausea, vomiting, or hematemesis;   No diarrhea or constipation. No melena or hematochezia.  NEUROLOGICAL: No headaches, memory loss, loss of strength, numbness, or tremors  SKIN: No itching, burning, rashes, or lesions   MUSCULOSKELETAL: No joint pain or swelling; No muscle, back, or extremity pain  PSYCHIATRIC: No depression, anxiety, mood swings, or difficulty sleeping        MEDICATIONS  (STANDING):  atorvastatin 80 milliGRAM(s) Oral at bedtime  budesonide  80 MICROgram(s)/formoterol 4.5 MICROgram(s) Inhaler 2 Puff(s) Inhalation two times a day  dextrose 5%. 1000 milliLiter(s) (50 mL/Hr) IV Continuous <Continuous>  dextrose 5%. 1000 milliLiter(s) (100 mL/Hr) IV Continuous <Continuous>  dextrose 50% Injectable 25 Gram(s) IV Push once  dextrose 50% Injectable 12.5 Gram(s) IV Push once  dextrose 50% Injectable 25 Gram(s) IV Push once  furosemide   Injectable 40 milliGRAM(s) IV Push daily  glucagon  Injectable 1 milliGRAM(s) IntraMuscular once  heparin   Injectable 5000 Unit(s) SubCutaneous every 8 hours  insulin glargine Injectable (LANTUS) 80 Unit(s) SubCutaneous at bedtime  insulin lispro (ADMELOG) corrective regimen sliding scale   SubCutaneous every 6 hours  insulin lispro Injectable (ADMELOG) 25 Unit(s) SubCutaneous three times a day before meals  lactulose Syrup 20 Gram(s) Oral daily  methylPREDNISolone sodium succinate Injectable 40 milliGRAM(s) IV Push every 8 hours  oxyCODONE  ER Tablet 60 milliGRAM(s) Oral every 12 hours  piperacillin/tazobactam IVPB.- 3.375 Gram(s) IV Intermittent once  piperacillin/tazobactam IVPB.. 3.375 Gram(s) IV Intermittent every 8 hours  polyethylene glycol 3350 17 Gram(s) Oral daily  senna 2 Tablet(s) Oral at bedtime  tiotropium 2.5 MICROgram(s) Inhaler 2 Puff(s) Inhalation daily    MEDICATIONS  (PRN):  acetaminophen     Tablet .. 650 milliGRAM(s) Oral every 6 hours PRN Temp greater or equal to 38C (100.4F), Mild Pain (1 - 3)  albuterol    90 MICROgram(s) HFA Inhaler 2 Puff(s) Inhalation every 2 hours PRN Shortness of Breath and/or Wheezing  albuterol/ipratropium for Nebulization 3 milliLiter(s) Nebulizer every 6 hours PRN Bronchospasm  ALPRAZolam 0.25 milliGRAM(s) Oral at bedtime PRN for anxiety/Sleep  dextrose Oral Gel 15 Gram(s) Oral once PRN Blood Glucose LESS THAN 70 milliGRAM(s)/deciliter  diphenhydrAMINE 25 milliGRAM(s) Oral every 6 hours PRN Rash and/or Itching  hydrALAZINE Injectable 10 milliGRAM(s) IV Push every 4 hours PRN for systolic bp > 160  HYDROmorphone  Injectable 0.5 milliGRAM(s) IV Push every 6 hours PRN Severe Pain (7 - 10)  HYDROmorphone  Injectable 1 milliGRAM(s) IV Push every 4 hours PRN breakthrough  pain  melatonin 3 milliGRAM(s) Oral at bedtime PRN Insomnia  ondansetron Injectable 4 milliGRAM(s) IV Push every 8 hours PRN Nausea and/or Vomiting  oxyCODONE    IR 5 milliGRAM(s) Oral every 6 hours PRN Moderate Pain (4 - 6)      Vital Signs Last 24 Hrs  T(C): 36.8 (20 Dec 2023 08:00), Max: 37.5 (19 Dec 2023 22:00)  T(F): 98.2 (20 Dec 2023 08:00), Max: 99.5 (19 Dec 2023 22:00)  HR: 87 (20 Dec 2023 10:00) (74 - 90)  BP: 147/71 (20 Dec 2023 10:00) (107/45 - 147/71)  BP(mean): 88 (20 Dec 2023 10:00) (58 - 88)  RR: 20 (20 Dec 2023 10:00) (18 - 20)  SpO2: 97% (20 Dec 2023 10:00) (90% - 100%)    Parameters below as of 20 Dec 2023 10:00  Patient On (Oxygen Delivery Method): nasal cannula, high flow  O2 Flow (L/min): 40  O2 Concentration (%): 55      PHYSICAL EXAM:    Constitutional: NAD, well-groomed, well-developed  HEENT: PERRL, no exophalmos  Neck: No LAD, no thyroid enlargement  Respiratory: CTAB  Cardiovascular: S1 and S2, RRR, no M/G/R  Gastrointestinal: BS+, soft, no organomegaly  Extremities: No peripheral edema, no pedal lesions  Neurological: A/O x 3, no focal deficits  Psychiatric: Normal mood, normal affect  Skin: No rashes, no acanthosis        LABS  12-20    136  |  90<L>  |  63.8<H>  ----------------------------<  397<H>  4.0   |  38.0<H>  |  1.61<H>    Ca    9.5      20 Dec 2023 07:40  Mg     2.2     12-20                            14.0   11.67 )-----------( 233      ( 20 Dec 2023 07:40 )             42.3       A1C with Estimated Average Glucose Result: 8.3 % (12-17-23 @ 08:20)  A1C with Estimated Average Glucose Result: 7.6 % (11-25-23 @ 09:00)            Thyroid Stimulating Hormone, Serum: 1.51 uIU/mL (12-16-23 @ 15:20)  Thyroid Stimulating Hormone, Serum: 3.02 uIU/mL (12-16-23 @ 10:18)    CAPILLARY BLOOD GLUCOSE      POCT Blood Glucose.: 356 mg/dL (20 Dec 2023 08:22)  POCT Blood Glucose.: 401 mg/dL (20 Dec 2023 06:22)  POCT Blood Glucose.: 434 mg/dL (19 Dec 2023 22:59)  POCT Blood Glucose.: 363 mg/dL (19 Dec 2023 18:01)  POCT Blood Glucose.: 383 mg/dL (19 Dec 2023 12:58)       Patient is a 68y old  Female who presents with a chief complaint of SOB     HPI:  67 yo female with HTN, HLD, DM2, HFpEF, CKD III, DVT, Uterine CA, COPD on home o2 who presented with complaints of shortness of breath. Patient reports that she has been sick all week and was recently started on Vantin and steroids while at the nursing home. Patient reports that her dyspnea just worsened and she told the nursing home to send her in for an evaluation. While in the ED, patient was placed on BIPAP and was given IV lasix with some improvement. RVP then results as RSV +. Patient had a CT scan which also showed pneumonia and pulmonary HTN. Started on high dose steroids. Hasd h/o DM, a1c 8.3%, at home on insulin, lantus 35 units daily and lispro 20 units tid with meals. Here needing high doses of insulin. endo consulted for same.      PAST MEDICAL & SURGICAL HISTORY:  Diabetes Mellitus Type II    HTN (Hypertension)    Endometrial Hyperplasia    Cervical Stenosis of Spine    Spinal Stenosis, Lumbar    Deep Vein Thrombosis (DVT)  Left leg, 2004, treated and resolved    Dyslipidemia    Cataract    Morbid Obesity    Vitamin D deficiency    Insomnia    CKD (chronic kidney disease)  ~ III    Congestive heart failure  ~ HFpEF    Uterine cancer    Asthma    On home oxygen therapy    Gait difficulty  ~ u ses walker    Cataract extracted with lens implant 1998  Right    C Section 1994    Cholecystectomy/appendectomy @ age 25    D&C x2 1980's    D&C 2008  hysteroscopy, endometrial hyperplasia, 2009    Cervical Spinal Stenosis surgery x2 (01/2002, 7/2002)    Tonsillectomy as a child    Endometrial biopsy 12/02/09    H/O laser iridotomy  left eye, 2016    H/O colonoscopy  1998    S/P total abdominal hysterectomy and bilateral salpingo-oophorectomy    S/P appendectomy    S/P cholecystectomy        Social History:  Does not smoke, drink or use illicit substances (16 Dec 2023 16:12)      FAMILY HISTORY:  Family history of pancreatic cancer    Family history of bladder cancer    Family history of lung cancer (Grandparent)          Allergies    wool- rash, itch (Other)  adhesives (Rash)  latex (Rash)  Bactrim (Flushing)    Intolerances        REVIEW OF SYSTEMS:    CONSTITUTIONAL: No fever, weight loss, or fatigue  EYES: No eye pain, visual disturbances, or discharge  ENMT:  No difficulty hearing, tinnitus, vertigo; No sinus or throat pain  NECK: No pain or stiffness  RESPIRATORY: + cough, no  hemoptysis; + shortness of breath  CARDIOVASCULAR: No chest pain, palpitations, dizziness, or leg swelling  GASTROINTESTINAL: No abdominal or epigastric pain. No nausea, vomiting, or hematemesis;   No diarrhea or constipation. No melena or hematochezia.  NEUROLOGICAL: No headaches, memory loss, loss of strength, numbness, or tremors  SKIN: No itching, burning, rashes, or lesions   MUSCULOSKELETAL: No joint pain or swelling; No muscle, back, or extremity pain  PSYCHIATRIC: No depression, anxiety, mood swings, or difficulty sleeping        MEDICATIONS  (STANDING):  atorvastatin 80 milliGRAM(s) Oral at bedtime  budesonide  80 MICROgram(s)/formoterol 4.5 MICROgram(s) Inhaler 2 Puff(s) Inhalation two times a day  dextrose 5%. 1000 milliLiter(s) (50 mL/Hr) IV Continuous <Continuous>  dextrose 5%. 1000 milliLiter(s) (100 mL/Hr) IV Continuous <Continuous>  dextrose 50% Injectable 25 Gram(s) IV Push once  dextrose 50% Injectable 12.5 Gram(s) IV Push once  dextrose 50% Injectable 25 Gram(s) IV Push once  furosemide   Injectable 40 milliGRAM(s) IV Push daily  glucagon  Injectable 1 milliGRAM(s) IntraMuscular once  heparin   Injectable 5000 Unit(s) SubCutaneous every 8 hours  insulin glargine Injectable (LANTUS) 80 Unit(s) SubCutaneous at bedtime  insulin lispro (ADMELOG) corrective regimen sliding scale   SubCutaneous every 6 hours  insulin lispro Injectable (ADMELOG) 25 Unit(s) SubCutaneous three times a day before meals  lactulose Syrup 20 Gram(s) Oral daily  methylPREDNISolone sodium succinate Injectable 40 milliGRAM(s) IV Push every 8 hours  oxyCODONE  ER Tablet 60 milliGRAM(s) Oral every 12 hours  piperacillin/tazobactam IVPB.- 3.375 Gram(s) IV Intermittent once  piperacillin/tazobactam IVPB.. 3.375 Gram(s) IV Intermittent every 8 hours  polyethylene glycol 3350 17 Gram(s) Oral daily  senna 2 Tablet(s) Oral at bedtime  tiotropium 2.5 MICROgram(s) Inhaler 2 Puff(s) Inhalation daily    MEDICATIONS  (PRN):  acetaminophen     Tablet .. 650 milliGRAM(s) Oral every 6 hours PRN Temp greater or equal to 38C (100.4F), Mild Pain (1 - 3)  albuterol    90 MICROgram(s) HFA Inhaler 2 Puff(s) Inhalation every 2 hours PRN Shortness of Breath and/or Wheezing  albuterol/ipratropium for Nebulization 3 milliLiter(s) Nebulizer every 6 hours PRN Bronchospasm  ALPRAZolam 0.25 milliGRAM(s) Oral at bedtime PRN for anxiety/Sleep  dextrose Oral Gel 15 Gram(s) Oral once PRN Blood Glucose LESS THAN 70 milliGRAM(s)/deciliter  diphenhydrAMINE 25 milliGRAM(s) Oral every 6 hours PRN Rash and/or Itching  hydrALAZINE Injectable 10 milliGRAM(s) IV Push every 4 hours PRN for systolic bp > 160  HYDROmorphone  Injectable 0.5 milliGRAM(s) IV Push every 6 hours PRN Severe Pain (7 - 10)  HYDROmorphone  Injectable 1 milliGRAM(s) IV Push every 4 hours PRN breakthrough  pain  melatonin 3 milliGRAM(s) Oral at bedtime PRN Insomnia  ondansetron Injectable 4 milliGRAM(s) IV Push every 8 hours PRN Nausea and/or Vomiting  oxyCODONE    IR 5 milliGRAM(s) Oral every 6 hours PRN Moderate Pain (4 - 6)      Vital Signs Last 24 Hrs  T(C): 36.8 (20 Dec 2023 08:00), Max: 37.5 (19 Dec 2023 22:00)  T(F): 98.2 (20 Dec 2023 08:00), Max: 99.5 (19 Dec 2023 22:00)  HR: 87 (20 Dec 2023 10:00) (74 - 90)  BP: 147/71 (20 Dec 2023 10:00) (107/45 - 147/71)  BP(mean): 88 (20 Dec 2023 10:00) (58 - 88)  RR: 20 (20 Dec 2023 10:00) (18 - 20)  SpO2: 97% (20 Dec 2023 10:00) (90% - 100%)    Parameters below as of 20 Dec 2023 10:00  Patient On (Oxygen Delivery Method): nasal cannula, high flow  O2 Flow (L/min): 40  O2 Concentration (%): 55      PHYSICAL EXAM:    Constitutional: NAD, well-groomed, well-developed  HEENT: PERRL, no exophalmos  Neck: No LAD, no thyroid enlargement  Respiratory: CTAB  Cardiovascular: S1 and S2, RRR, no M/G/R  Gastrointestinal: BS+, soft, no organomegaly  Extremities: No peripheral edema, no pedal lesions  Neurological: A/O x 3, no focal deficits  Psychiatric: Normal mood, normal affect  Skin: No rashes, no acanthosis        LABS  12-20    136  |  90<L>  |  63.8<H>  ----------------------------<  397<H>  4.0   |  38.0<H>  |  1.61<H>    Ca    9.5      20 Dec 2023 07:40  Mg     2.2     12-20                            14.0   11.67 )-----------( 233      ( 20 Dec 2023 07:40 )             42.3       A1C with Estimated Average Glucose Result: 8.3 % (12-17-23 @ 08:20)  A1C with Estimated Average Glucose Result: 7.6 % (11-25-23 @ 09:00)            Thyroid Stimulating Hormone, Serum: 1.51 uIU/mL (12-16-23 @ 15:20)  Thyroid Stimulating Hormone, Serum: 3.02 uIU/mL (12-16-23 @ 10:18)    CAPILLARY BLOOD GLUCOSE      POCT Blood Glucose.: 356 mg/dL (20 Dec 2023 08:22)  POCT Blood Glucose.: 401 mg/dL (20 Dec 2023 06:22)  POCT Blood Glucose.: 434 mg/dL (19 Dec 2023 22:59)  POCT Blood Glucose.: 363 mg/dL (19 Dec 2023 18:01)  POCT Blood Glucose.: 383 mg/dL (19 Dec 2023 12:58)       Patient is a 68y old  Female who presents with a chief complaint of SOB     HPI:  67 yo female with HTN, HLD, DM2, HFpEF, CKD III, DVT, Uterine CA, COPD on home o2 who presented with complaints of shortness of breath. Patient reports that she has been sick all week and was recently started on Vantin and steroids while at the nursing home. Patient reports that her dyspnea just worsened and she told the nursing home to send her in for an evaluation. While in the ED, patient was placed on BIPAP and was given IV lasix with some improvement. RVP then results as RSV +. Patient had a CT scan which also showed pneumonia and pulmonary HTN. Started on high dose steroids. Has  h/o DM for   more that 20 years, a1c 8.3%, at home on insulin, lantus  68 units daily  ( 30 units in am and 38 units in pm) and lispro 20  to 25 units tid with meals. Here needing high doses of insulin. endo consulted for same.      PAST MEDICAL & SURGICAL HISTORY:  Diabetes Mellitus Type II    HTN (Hypertension)    Endometrial Hyperplasia    Cervical Stenosis of Spine    Spinal Stenosis, Lumbar    Deep Vein Thrombosis (DVT)  Left leg, 2004, treated and resolved    Dyslipidemia    Cataract    Morbid Obesity    Vitamin D deficiency    Insomnia    CKD (chronic kidney disease)  ~ III    Congestive heart failure  ~ HFpEF    Uterine cancer    Asthma    On home oxygen therapy    Gait difficulty  ~ u ses walker    Cataract extracted with lens implant 1998  Right    C Section 1994    Cholecystectomy/appendectomy @ age 25    D&C x2 1980's    D&C 2008  hysteroscopy, endometrial hyperplasia, 2009    Cervical Spinal Stenosis surgery x2 (01/2002, 7/2002)    Tonsillectomy as a child    Endometrial biopsy 12/02/09    H/O laser iridotomy  left eye, 2016    H/O colonoscopy  1998    S/P total abdominal hysterectomy and bilateral salpingo-oophorectomy    S/P appendectomy    S/P cholecystectomy        Social History:  Does not smoke, drink or use illicit substances (16 Dec 2023 16:12)      FAMILY HISTORY:  Family history of pancreatic cancer    Family history of bladder cancer    Family history of lung cancer (Grandparent)          Allergies    wool- rash, itch (Other)  adhesives (Rash)  latex (Rash)  Bactrim (Flushing)    Intolerances        REVIEW OF SYSTEMS:    CONSTITUTIONAL: No fever, weight loss, or fatigue  EYES: No eye pain, visual disturbances, or discharge  ENMT:  No difficulty hearing, tinnitus, vertigo; No sinus or throat pain  NECK: No pain or stiffness  RESPIRATORY: + cough, no  hemoptysis; + shortness of breath  CARDIOVASCULAR: No chest pain, palpitations, dizziness, or leg swelling  GASTROINTESTINAL: No abdominal or epigastric pain. No nausea, vomiting, or hematemesis;   No diarrhea or constipation. No melena or hematochezia.  NEUROLOGICAL: No headaches, memory loss, loss of strength, numbness, or tremors  SKIN: No itching, burning, rashes, or lesions   MUSCULOSKELETAL: No joint pain or swelling; No muscle, back, or extremity pain  PSYCHIATRIC: No depression, anxiety, mood swings, or difficulty sleeping        MEDICATIONS  (STANDING):  atorvastatin 80 milliGRAM(s) Oral at bedtime  budesonide  80 MICROgram(s)/formoterol 4.5 MICROgram(s) Inhaler 2 Puff(s) Inhalation two times a day  dextrose 5%. 1000 milliLiter(s) (50 mL/Hr) IV Continuous <Continuous>  dextrose 5%. 1000 milliLiter(s) (100 mL/Hr) IV Continuous <Continuous>  dextrose 50% Injectable 25 Gram(s) IV Push once  dextrose 50% Injectable 12.5 Gram(s) IV Push once  dextrose 50% Injectable 25 Gram(s) IV Push once  furosemide   Injectable 40 milliGRAM(s) IV Push daily  glucagon  Injectable 1 milliGRAM(s) IntraMuscular once  heparin   Injectable 5000 Unit(s) SubCutaneous every 8 hours  insulin glargine Injectable (LANTUS) 80 Unit(s) SubCutaneous at bedtime  insulin lispro (ADMELOG) corrective regimen sliding scale   SubCutaneous every 6 hours  insulin lispro Injectable (ADMELOG) 25 Unit(s) SubCutaneous three times a day before meals  lactulose Syrup 20 Gram(s) Oral daily  methylPREDNISolone sodium succinate Injectable 40 milliGRAM(s) IV Push every 8 hours  oxyCODONE  ER Tablet 60 milliGRAM(s) Oral every 12 hours  piperacillin/tazobactam IVPB.- 3.375 Gram(s) IV Intermittent once  piperacillin/tazobactam IVPB.. 3.375 Gram(s) IV Intermittent every 8 hours  polyethylene glycol 3350 17 Gram(s) Oral daily  senna 2 Tablet(s) Oral at bedtime  tiotropium 2.5 MICROgram(s) Inhaler 2 Puff(s) Inhalation daily    MEDICATIONS  (PRN):  acetaminophen     Tablet .. 650 milliGRAM(s) Oral every 6 hours PRN Temp greater or equal to 38C (100.4F), Mild Pain (1 - 3)  albuterol    90 MICROgram(s) HFA Inhaler 2 Puff(s) Inhalation every 2 hours PRN Shortness of Breath and/or Wheezing  albuterol/ipratropium for Nebulization 3 milliLiter(s) Nebulizer every 6 hours PRN Bronchospasm  ALPRAZolam 0.25 milliGRAM(s) Oral at bedtime PRN for anxiety/Sleep  dextrose Oral Gel 15 Gram(s) Oral once PRN Blood Glucose LESS THAN 70 milliGRAM(s)/deciliter  diphenhydrAMINE 25 milliGRAM(s) Oral every 6 hours PRN Rash and/or Itching  hydrALAZINE Injectable 10 milliGRAM(s) IV Push every 4 hours PRN for systolic bp > 160  HYDROmorphone  Injectable 0.5 milliGRAM(s) IV Push every 6 hours PRN Severe Pain (7 - 10)  HYDROmorphone  Injectable 1 milliGRAM(s) IV Push every 4 hours PRN breakthrough  pain  melatonin 3 milliGRAM(s) Oral at bedtime PRN Insomnia  ondansetron Injectable 4 milliGRAM(s) IV Push every 8 hours PRN Nausea and/or Vomiting  oxyCODONE    IR 5 milliGRAM(s) Oral every 6 hours PRN Moderate Pain (4 - 6)      Vital Signs Last 24 Hrs  T(C): 36.8 (20 Dec 2023 08:00), Max: 37.5 (19 Dec 2023 22:00)  T(F): 98.2 (20 Dec 2023 08:00), Max: 99.5 (19 Dec 2023 22:00)  HR: 87 (20 Dec 2023 10:00) (74 - 90)  BP: 147/71 (20 Dec 2023 10:00) (107/45 - 147/71)  BP(mean): 88 (20 Dec 2023 10:00) (58 - 88)  RR: 20 (20 Dec 2023 10:00) (18 - 20)  SpO2: 97% (20 Dec 2023 10:00) (90% - 100%)    Parameters below as of 20 Dec 2023 10:00  Patient On (Oxygen Delivery Method): nasal cannula, high flow  O2 Flow (L/min): 40  O2 Concentration (%): 55      PHYSICAL EXAM:    Constitutional: NAD, obese  HEENT: PERRL, no exophalmos  Respiratory: decreased breath sounds b/l  Cardiovascular: S1 and S2, RRR,  Gastrointestinal: BS+, soft,  non tender  Extremities: mild swelling b/l LE  Neurological: A/O x 3, no focal deficits  Psychiatric: Normal mood, normal affect  Skin: No rashes, no acanthosis        LABS  12-20    136  |  90<L>  |  63.8<H>  ----------------------------<  397<H>  4.0   |  38.0<H>  |  1.61<H>    Ca    9.5      20 Dec 2023 07:40  Mg     2.2     12-20                            14.0   11.67 )-----------( 233      ( 20 Dec 2023 07:40 )             42.3       A1C with Estimated Average Glucose Result: 8.3 % (12-17-23 @ 08:20)  A1C with Estimated Average Glucose Result: 7.6 % (11-25-23 @ 09:00)            Thyroid Stimulating Hormone, Serum: 1.51 uIU/mL (12-16-23 @ 15:20)  Thyroid Stimulating Hormone, Serum: 3.02 uIU/mL (12-16-23 @ 10:18)    CAPILLARY BLOOD GLUCOSE      POCT Blood Glucose.: 356 mg/dL (20 Dec 2023 08:22)  POCT Blood Glucose.: 401 mg/dL (20 Dec 2023 06:22)  POCT Blood Glucose.: 434 mg/dL (19 Dec 2023 22:59)  POCT Blood Glucose.: 363 mg/dL (19 Dec 2023 18:01)  POCT Blood Glucose.: 383 mg/dL (19 Dec 2023 12:58)       Patient is a 68y old  Female who presents with a chief complaint of SOB     HPI:  69 yo female with HTN, HLD, DM2, HFpEF, CKD III, DVT, Uterine CA, COPD on home o2 who presented with complaints of shortness of breath. Patient reports that she has been sick all week and was recently started on Vantin and steroids while at the nursing home. Patient reports that her dyspnea just worsened and she told the nursing home to send her in for an evaluation. While in the ED, patient was placed on BIPAP and was given IV lasix with some improvement. RVP then results as RSV +. Patient had a CT scan which also showed pneumonia and pulmonary HTN. Started on high dose steroids. Has  h/o DM for   more that 20 years, a1c 8.3%, at home on insulin, lantus  68 units daily  ( 30 units in am and 38 units in pm) and lispro 20  to 25 units tid with meals. Here needing high doses of insulin. endo consulted for same.      PAST MEDICAL & SURGICAL HISTORY:  Diabetes Mellitus Type II    HTN (Hypertension)    Endometrial Hyperplasia    Cervical Stenosis of Spine    Spinal Stenosis, Lumbar    Deep Vein Thrombosis (DVT)  Left leg, 2004, treated and resolved    Dyslipidemia    Cataract    Morbid Obesity    Vitamin D deficiency    Insomnia    CKD (chronic kidney disease)  ~ III    Congestive heart failure  ~ HFpEF    Uterine cancer    Asthma    On home oxygen therapy    Gait difficulty  ~ u ses walker    Cataract extracted with lens implant 1998  Right    C Section 1994    Cholecystectomy/appendectomy @ age 25    D&C x2 1980's    D&C 2008  hysteroscopy, endometrial hyperplasia, 2009    Cervical Spinal Stenosis surgery x2 (01/2002, 7/2002)    Tonsillectomy as a child    Endometrial biopsy 12/02/09    H/O laser iridotomy  left eye, 2016    H/O colonoscopy  1998    S/P total abdominal hysterectomy and bilateral salpingo-oophorectomy    S/P appendectomy    S/P cholecystectomy        Social History:  Does not smoke, drink or use illicit substances (16 Dec 2023 16:12)      FAMILY HISTORY:  Family history of pancreatic cancer    Family history of bladder cancer    Family history of lung cancer (Grandparent)          Allergies    wool- rash, itch (Other)  adhesives (Rash)  latex (Rash)  Bactrim (Flushing)    Intolerances        REVIEW OF SYSTEMS:    CONSTITUTIONAL: No fever, weight loss, or fatigue  EYES: No eye pain, visual disturbances, or discharge  ENMT:  No difficulty hearing, tinnitus, vertigo; No sinus or throat pain  NECK: No pain or stiffness  RESPIRATORY: + cough, no  hemoptysis; + shortness of breath  CARDIOVASCULAR: No chest pain, palpitations, dizziness, or leg swelling  GASTROINTESTINAL: No abdominal or epigastric pain. No nausea, vomiting, or hematemesis;   No diarrhea or constipation. No melena or hematochezia.  NEUROLOGICAL: No headaches, memory loss, loss of strength, numbness, or tremors  SKIN: No itching, burning, rashes, or lesions   MUSCULOSKELETAL: No joint pain or swelling; No muscle, back, or extremity pain  PSYCHIATRIC: No depression, anxiety, mood swings, or difficulty sleeping        MEDICATIONS  (STANDING):  atorvastatin 80 milliGRAM(s) Oral at bedtime  budesonide  80 MICROgram(s)/formoterol 4.5 MICROgram(s) Inhaler 2 Puff(s) Inhalation two times a day  dextrose 5%. 1000 milliLiter(s) (50 mL/Hr) IV Continuous <Continuous>  dextrose 5%. 1000 milliLiter(s) (100 mL/Hr) IV Continuous <Continuous>  dextrose 50% Injectable 25 Gram(s) IV Push once  dextrose 50% Injectable 12.5 Gram(s) IV Push once  dextrose 50% Injectable 25 Gram(s) IV Push once  furosemide   Injectable 40 milliGRAM(s) IV Push daily  glucagon  Injectable 1 milliGRAM(s) IntraMuscular once  heparin   Injectable 5000 Unit(s) SubCutaneous every 8 hours  insulin glargine Injectable (LANTUS) 80 Unit(s) SubCutaneous at bedtime  insulin lispro (ADMELOG) corrective regimen sliding scale   SubCutaneous every 6 hours  insulin lispro Injectable (ADMELOG) 25 Unit(s) SubCutaneous three times a day before meals  lactulose Syrup 20 Gram(s) Oral daily  methylPREDNISolone sodium succinate Injectable 40 milliGRAM(s) IV Push every 8 hours  oxyCODONE  ER Tablet 60 milliGRAM(s) Oral every 12 hours  piperacillin/tazobactam IVPB.- 3.375 Gram(s) IV Intermittent once  piperacillin/tazobactam IVPB.. 3.375 Gram(s) IV Intermittent every 8 hours  polyethylene glycol 3350 17 Gram(s) Oral daily  senna 2 Tablet(s) Oral at bedtime  tiotropium 2.5 MICROgram(s) Inhaler 2 Puff(s) Inhalation daily    MEDICATIONS  (PRN):  acetaminophen     Tablet .. 650 milliGRAM(s) Oral every 6 hours PRN Temp greater or equal to 38C (100.4F), Mild Pain (1 - 3)  albuterol    90 MICROgram(s) HFA Inhaler 2 Puff(s) Inhalation every 2 hours PRN Shortness of Breath and/or Wheezing  albuterol/ipratropium for Nebulization 3 milliLiter(s) Nebulizer every 6 hours PRN Bronchospasm  ALPRAZolam 0.25 milliGRAM(s) Oral at bedtime PRN for anxiety/Sleep  dextrose Oral Gel 15 Gram(s) Oral once PRN Blood Glucose LESS THAN 70 milliGRAM(s)/deciliter  diphenhydrAMINE 25 milliGRAM(s) Oral every 6 hours PRN Rash and/or Itching  hydrALAZINE Injectable 10 milliGRAM(s) IV Push every 4 hours PRN for systolic bp > 160  HYDROmorphone  Injectable 0.5 milliGRAM(s) IV Push every 6 hours PRN Severe Pain (7 - 10)  HYDROmorphone  Injectable 1 milliGRAM(s) IV Push every 4 hours PRN breakthrough  pain  melatonin 3 milliGRAM(s) Oral at bedtime PRN Insomnia  ondansetron Injectable 4 milliGRAM(s) IV Push every 8 hours PRN Nausea and/or Vomiting  oxyCODONE    IR 5 milliGRAM(s) Oral every 6 hours PRN Moderate Pain (4 - 6)      Vital Signs Last 24 Hrs  T(C): 36.8 (20 Dec 2023 08:00), Max: 37.5 (19 Dec 2023 22:00)  T(F): 98.2 (20 Dec 2023 08:00), Max: 99.5 (19 Dec 2023 22:00)  HR: 87 (20 Dec 2023 10:00) (74 - 90)  BP: 147/71 (20 Dec 2023 10:00) (107/45 - 147/71)  BP(mean): 88 (20 Dec 2023 10:00) (58 - 88)  RR: 20 (20 Dec 2023 10:00) (18 - 20)  SpO2: 97% (20 Dec 2023 10:00) (90% - 100%)    Parameters below as of 20 Dec 2023 10:00  Patient On (Oxygen Delivery Method): nasal cannula, high flow  O2 Flow (L/min): 40  O2 Concentration (%): 55      PHYSICAL EXAM:    Constitutional: NAD, obese  HEENT: PERRL, no exophalmos  Respiratory: decreased breath sounds b/l  Cardiovascular: S1 and S2, RRR,  Gastrointestinal: BS+, soft,  non tender  Extremities: mild swelling b/l LE  Neurological: A/O x 3, no focal deficits  Psychiatric: Normal mood, normal affect  Skin: No rashes, no acanthosis        LABS  12-20    136  |  90<L>  |  63.8<H>  ----------------------------<  397<H>  4.0   |  38.0<H>  |  1.61<H>    Ca    9.5      20 Dec 2023 07:40  Mg     2.2     12-20                            14.0   11.67 )-----------( 233      ( 20 Dec 2023 07:40 )             42.3       A1C with Estimated Average Glucose Result: 8.3 % (12-17-23 @ 08:20)  A1C with Estimated Average Glucose Result: 7.6 % (11-25-23 @ 09:00)            Thyroid Stimulating Hormone, Serum: 1.51 uIU/mL (12-16-23 @ 15:20)  Thyroid Stimulating Hormone, Serum: 3.02 uIU/mL (12-16-23 @ 10:18)    CAPILLARY BLOOD GLUCOSE      POCT Blood Glucose.: 356 mg/dL (20 Dec 2023 08:22)  POCT Blood Glucose.: 401 mg/dL (20 Dec 2023 06:22)  POCT Blood Glucose.: 434 mg/dL (19 Dec 2023 22:59)  POCT Blood Glucose.: 363 mg/dL (19 Dec 2023 18:01)  POCT Blood Glucose.: 383 mg/dL (19 Dec 2023 12:58)

## 2023-12-20 NOTE — PROGRESS NOTE ADULT - ASSESSMENT
Acute on chronic Hypercapnic hypoxemic resp failure  COPD,RSV, HFpEF, Morbid obesity, chronic pain on narcotics  New Consolidation RLL, ABG compensated  Continue abx, nebs, medrol, gentle diuresis, on full AC  Off continuous NIV, wean HFNC   continue NIV nocturnal and prn  HOB elevated  prognosis guarded Acute on chronic Hypercapnic hypoxemic resp failure  COPD,RSV, HFpEF, Morbid obesity, chronic pain on narcotics  New Consolidation RLL, ABG compensated  Continue abx, nebs, medrol, gentle diuresis, on full AC  Off continuous NIV, wean HFNC   continue NIV nocturnal and prn  Never seen by Pulmonary in office, will need nocturnal NIV to decrease re admissions and improve mortality, C02 remains elevated despite bipap  HOB elevated  prognosis guarded Acute on chronic Hypercapnic hypoxemic resp failure  COPD,RSV, HFpEF, Morbid obesity, chronic pain on narcotics  New Consolidation RLL, ABG compensated  Continue abx, nebs, medrol, gentle diuresis,   Off continuous NIV, wean HFNC   continue NIV nocturnal and prn  Never seen by Pulmonary in office, will need nocturnal NIV to decrease re admissions and improve mortality, C02 remains elevated despite bipap  HOB elevated  prognosis guarded Acute on chronic Hypercapnic hypoxemic resp failure  COPD,RSV, HFpEF, Morbid obesity, chronic pain on narcotics  New Consolidation RLL, ABG compensated  Hx PE/DVT, duplex and D dimer negative  Continue abx, nebs, medrol, gentle diuresis,   Off continuous NIV, wean HFNC   continue NIV nocturnal and prn  Never seen by Pulmonary in office, will need nocturnal NIV to decrease re admissions and improve mortality, C02 remains elevated despite bipap  HOB elevated  prognosis guarded Acute on chronic Hypercapnic hypoxemic resp failure  COPD,RSV, HFpEF, Morbid obesity, chronic pain on narcotics  New Consolidation RLL, ABG compensated  Hx PE/DVT, duplex and D dimer negative, AC held for ? cath in AM , will discuss with cardiology  Continue abx, nebs, medrol, gentle diuresis,   Off continuous NIV, wean HFNC   continue NIV nocturnal and prn  Never seen by Pulmonary in office, will need nocturnal NIV to decrease re admissions and improve mortality, C02 remains elevated despite bipap  HOB elevated  prognosis guarded

## 2023-12-20 NOTE — CONSULT NOTE ADULT - ASSESSMENT
69 yo female with HTN, HLD, DM2, HFpEF, CKD III, DVT, Uterine CA, COPD on home o2 who presented with complaints of shortness of breath.   results came back  RSV +.   Patient had a CT scan which also showed pneumonia and pulmonary HTN.   Started on high dose steroids.   Has h/o DM, a1c 8.3%, at home on insulin, lantus 35 units daily and lispro 20 units tid with meals. Here needing high doses of insulin. endo consulted for same.      DM type 2 uncontrolled: sugars in 300s and 400s, hyperglycemia probably steroid induced.  -Lantus dose increased to 80 units daily last night, agree with continuing lantus 80 units daily for now, also dose   of admelog increased to 25 units tid with meals today, will recommend to go up to 30 units tid with meals and intense sliding scale.  -To watch sugars closely as sugars will change with changing dose of steroids an d we will adjust insulin doses accordingly.  -Diabetic diet.    Acute on chronic respiratory failure/ likely multifactorial  -management per primary team  -c/w Zosyn  -IV steroids  -c/w IV Lasix QD  -follow Cardiology recs    HLD  -c/w statins         69 yo female with HTN, HLD, DM2, HFpEF, CKD III, DVT, Uterine CA, COPD on home o2 who presented with complaints of shortness of breath.   results came back  RSV +.   Patient had a CT scan which also showed pneumonia and pulmonary HTN.   Started on high dose steroids.    Has  h/o DM for  more that 20 years, a1c 8.3%, at home on insulin, lantus  68 units daily  ( 30 units in am and 38 units in pm) and lispro 20  to 25 units tid with meals. Here needing high doses of insulin. endo consulted for same.        DM type 2 uncontrolled: sugars in 300s and 400s, hyperglycemia probably steroid induced.  -Lantus dose increased to 80 units daily last night, will recommend to give her  lantus 40 units bid,  also dose   of admelog increased to 25 units tid with meals today, will recommend to go up to 30 units tid with meals and intense sliding scale.  -To watch sugars closely as sugars will change with changing dose of steroids and we will adjust insulin doses accordingly.  -Diabetic diet.    Acute on chronic respiratory failure/ likely multifactorial  -management per primary team  -c/w Zosyn  -IV steroids  -c/w IV Lasix QD  -follow Cardiology recs    HLD  -c/w statins

## 2023-12-20 NOTE — PHYSICAL THERAPY INITIAL EVALUATION ADULT - ADDITIONAL COMMENTS
per patient she has been in rehab working with PT, mostly in bed but has started to stand at bedside with RW and assist but it unable to advance LEs for walking

## 2023-12-20 NOTE — PROGRESS NOTE ADULT - PROBLEM SELECTOR PLAN 2
- RSV positive with secondary Resp infection   - c/w zosyn   - c/w tiotropium, solumedrol, and formoterol  - bipap weaned off, currently on HFNC 40L/60%   - care per primary team.

## 2023-12-21 LAB
ANION GAP SERPL CALC-SCNC: 12 MMOL/L — SIGNIFICANT CHANGE UP (ref 5–17)
ANION GAP SERPL CALC-SCNC: 12 MMOL/L — SIGNIFICANT CHANGE UP (ref 5–17)
ANION GAP SERPL CALC-SCNC: 14 MMOL/L — SIGNIFICANT CHANGE UP (ref 5–17)
ANION GAP SERPL CALC-SCNC: 14 MMOL/L — SIGNIFICANT CHANGE UP (ref 5–17)
APTT BLD: 26.8 SEC — SIGNIFICANT CHANGE UP (ref 24.5–35.6)
APTT BLD: 26.8 SEC — SIGNIFICANT CHANGE UP (ref 24.5–35.6)
BUN SERPL-MCNC: 57.1 MG/DL — HIGH (ref 8–20)
BUN SERPL-MCNC: 57.1 MG/DL — HIGH (ref 8–20)
BUN SERPL-MCNC: 62.4 MG/DL — HIGH (ref 8–20)
BUN SERPL-MCNC: 62.4 MG/DL — HIGH (ref 8–20)
CALCIUM SERPL-MCNC: 9.5 MG/DL — SIGNIFICANT CHANGE UP (ref 8.4–10.5)
CHLORIDE SERPL-SCNC: 88 MMOL/L — LOW (ref 96–108)
CHLORIDE SERPL-SCNC: 88 MMOL/L — LOW (ref 96–108)
CHLORIDE SERPL-SCNC: 91 MMOL/L — LOW (ref 96–108)
CHLORIDE SERPL-SCNC: 91 MMOL/L — LOW (ref 96–108)
CO2 SERPL-SCNC: 33 MMOL/L — HIGH (ref 22–29)
CO2 SERPL-SCNC: 33 MMOL/L — HIGH (ref 22–29)
CO2 SERPL-SCNC: 35 MMOL/L — HIGH (ref 22–29)
CO2 SERPL-SCNC: 35 MMOL/L — HIGH (ref 22–29)
CREAT SERPL-MCNC: 1.57 MG/DL — HIGH (ref 0.5–1.3)
CREAT SERPL-MCNC: 1.57 MG/DL — HIGH (ref 0.5–1.3)
CREAT SERPL-MCNC: 1.82 MG/DL — HIGH (ref 0.5–1.3)
CREAT SERPL-MCNC: 1.82 MG/DL — HIGH (ref 0.5–1.3)
CULTURE RESULTS: SIGNIFICANT CHANGE UP
EGFR: 30 ML/MIN/1.73M2 — LOW
EGFR: 30 ML/MIN/1.73M2 — LOW
EGFR: 35 ML/MIN/1.73M2 — LOW
EGFR: 35 ML/MIN/1.73M2 — LOW
GLUCOSE BLDC GLUCOMTR-MCNC: 229 MG/DL — HIGH (ref 70–99)
GLUCOSE BLDC GLUCOMTR-MCNC: 229 MG/DL — HIGH (ref 70–99)
GLUCOSE BLDC GLUCOMTR-MCNC: 265 MG/DL — HIGH (ref 70–99)
GLUCOSE BLDC GLUCOMTR-MCNC: 265 MG/DL — HIGH (ref 70–99)
GLUCOSE BLDC GLUCOMTR-MCNC: 271 MG/DL — HIGH (ref 70–99)
GLUCOSE BLDC GLUCOMTR-MCNC: 271 MG/DL — HIGH (ref 70–99)
GLUCOSE BLDC GLUCOMTR-MCNC: 283 MG/DL — HIGH (ref 70–99)
GLUCOSE BLDC GLUCOMTR-MCNC: 283 MG/DL — HIGH (ref 70–99)
GLUCOSE BLDC GLUCOMTR-MCNC: 310 MG/DL — HIGH (ref 70–99)
GLUCOSE BLDC GLUCOMTR-MCNC: 310 MG/DL — HIGH (ref 70–99)
GLUCOSE SERPL-MCNC: 271 MG/DL — HIGH (ref 70–99)
GLUCOSE SERPL-MCNC: 271 MG/DL — HIGH (ref 70–99)
GLUCOSE SERPL-MCNC: 303 MG/DL — HIGH (ref 70–99)
GLUCOSE SERPL-MCNC: 303 MG/DL — HIGH (ref 70–99)
HCT VFR BLD CALC: 46 % — HIGH (ref 34.5–45)
HCT VFR BLD CALC: 46 % — HIGH (ref 34.5–45)
HCT VFR BLD CALC: 46.9 % — HIGH (ref 34.5–45)
HCT VFR BLD CALC: 46.9 % — HIGH (ref 34.5–45)
HGB BLD-MCNC: 15.4 G/DL — SIGNIFICANT CHANGE UP (ref 11.5–15.5)
HGB BLD-MCNC: 15.4 G/DL — SIGNIFICANT CHANGE UP (ref 11.5–15.5)
HGB BLD-MCNC: 16 G/DL — HIGH (ref 11.5–15.5)
HGB BLD-MCNC: 16 G/DL — HIGH (ref 11.5–15.5)
INR BLD: 1.03 RATIO — SIGNIFICANT CHANGE UP (ref 0.85–1.18)
INR BLD: 1.03 RATIO — SIGNIFICANT CHANGE UP (ref 0.85–1.18)
MAGNESIUM SERPL-MCNC: 2.3 MG/DL — SIGNIFICANT CHANGE UP (ref 1.6–2.6)
MAGNESIUM SERPL-MCNC: 2.3 MG/DL — SIGNIFICANT CHANGE UP (ref 1.6–2.6)
MAGNESIUM SERPL-MCNC: 2.3 MG/DL — SIGNIFICANT CHANGE UP (ref 1.8–2.6)
MAGNESIUM SERPL-MCNC: 2.3 MG/DL — SIGNIFICANT CHANGE UP (ref 1.8–2.6)
MCHC RBC-ENTMCNC: 30.7 PG — SIGNIFICANT CHANGE UP (ref 27–34)
MCHC RBC-ENTMCNC: 33.5 GM/DL — SIGNIFICANT CHANGE UP (ref 32–36)
MCHC RBC-ENTMCNC: 33.5 GM/DL — SIGNIFICANT CHANGE UP (ref 32–36)
MCHC RBC-ENTMCNC: 34.1 GM/DL — SIGNIFICANT CHANGE UP (ref 32–36)
MCHC RBC-ENTMCNC: 34.1 GM/DL — SIGNIFICANT CHANGE UP (ref 32–36)
MCV RBC AUTO: 90 FL — SIGNIFICANT CHANGE UP (ref 80–100)
MCV RBC AUTO: 90 FL — SIGNIFICANT CHANGE UP (ref 80–100)
MCV RBC AUTO: 91.6 FL — SIGNIFICANT CHANGE UP (ref 80–100)
MCV RBC AUTO: 91.6 FL — SIGNIFICANT CHANGE UP (ref 80–100)
PHOSPHATE SERPL-MCNC: 3.2 MG/DL — SIGNIFICANT CHANGE UP (ref 2.4–4.7)
PHOSPHATE SERPL-MCNC: 3.2 MG/DL — SIGNIFICANT CHANGE UP (ref 2.4–4.7)
PLATELET # BLD AUTO: 262 K/UL — SIGNIFICANT CHANGE UP (ref 150–400)
PLATELET # BLD AUTO: 262 K/UL — SIGNIFICANT CHANGE UP (ref 150–400)
PLATELET # BLD AUTO: 278 K/UL — SIGNIFICANT CHANGE UP (ref 150–400)
PLATELET # BLD AUTO: 278 K/UL — SIGNIFICANT CHANGE UP (ref 150–400)
POTASSIUM SERPL-MCNC: 4.5 MMOL/L — SIGNIFICANT CHANGE UP (ref 3.5–5.3)
POTASSIUM SERPL-MCNC: 4.5 MMOL/L — SIGNIFICANT CHANGE UP (ref 3.5–5.3)
POTASSIUM SERPL-MCNC: 4.6 MMOL/L — SIGNIFICANT CHANGE UP (ref 3.5–5.3)
POTASSIUM SERPL-MCNC: 4.6 MMOL/L — SIGNIFICANT CHANGE UP (ref 3.5–5.3)
POTASSIUM SERPL-SCNC: 4.5 MMOL/L — SIGNIFICANT CHANGE UP (ref 3.5–5.3)
POTASSIUM SERPL-SCNC: 4.5 MMOL/L — SIGNIFICANT CHANGE UP (ref 3.5–5.3)
POTASSIUM SERPL-SCNC: 4.6 MMOL/L — SIGNIFICANT CHANGE UP (ref 3.5–5.3)
POTASSIUM SERPL-SCNC: 4.6 MMOL/L — SIGNIFICANT CHANGE UP (ref 3.5–5.3)
PROTHROM AB SERPL-ACNC: 11.4 SEC — SIGNIFICANT CHANGE UP (ref 9.5–13)
PROTHROM AB SERPL-ACNC: 11.4 SEC — SIGNIFICANT CHANGE UP (ref 9.5–13)
RBC # BLD: 5.02 M/UL — SIGNIFICANT CHANGE UP (ref 3.8–5.2)
RBC # BLD: 5.02 M/UL — SIGNIFICANT CHANGE UP (ref 3.8–5.2)
RBC # BLD: 5.21 M/UL — HIGH (ref 3.8–5.2)
RBC # BLD: 5.21 M/UL — HIGH (ref 3.8–5.2)
RBC # FLD: 13.3 % — SIGNIFICANT CHANGE UP (ref 10.3–14.5)
RBC # FLD: 13.3 % — SIGNIFICANT CHANGE UP (ref 10.3–14.5)
RBC # FLD: 13.4 % — SIGNIFICANT CHANGE UP (ref 10.3–14.5)
RBC # FLD: 13.4 % — SIGNIFICANT CHANGE UP (ref 10.3–14.5)
SODIUM SERPL-SCNC: 135 MMOL/L — SIGNIFICANT CHANGE UP (ref 135–145)
SODIUM SERPL-SCNC: 135 MMOL/L — SIGNIFICANT CHANGE UP (ref 135–145)
SODIUM SERPL-SCNC: 138 MMOL/L — SIGNIFICANT CHANGE UP (ref 135–145)
SODIUM SERPL-SCNC: 138 MMOL/L — SIGNIFICANT CHANGE UP (ref 135–145)
SPECIMEN SOURCE: SIGNIFICANT CHANGE UP
TROPONIN T, HIGH SENSITIVITY RESULT: 34 NG/L — SIGNIFICANT CHANGE UP (ref 0–51)
TROPONIN T, HIGH SENSITIVITY RESULT: 34 NG/L — SIGNIFICANT CHANGE UP (ref 0–51)
WBC # BLD: 15.26 K/UL — HIGH (ref 3.8–10.5)
WBC # BLD: 15.26 K/UL — HIGH (ref 3.8–10.5)
WBC # BLD: 19.81 K/UL — HIGH (ref 3.8–10.5)
WBC # BLD: 19.81 K/UL — HIGH (ref 3.8–10.5)
WBC # FLD AUTO: 15.26 K/UL — HIGH (ref 3.8–10.5)
WBC # FLD AUTO: 15.26 K/UL — HIGH (ref 3.8–10.5)
WBC # FLD AUTO: 19.81 K/UL — HIGH (ref 3.8–10.5)
WBC # FLD AUTO: 19.81 K/UL — HIGH (ref 3.8–10.5)

## 2023-12-21 PROCEDURE — 99232 SBSQ HOSP IP/OBS MODERATE 35: CPT

## 2023-12-21 PROCEDURE — 70450 CT HEAD/BRAIN W/O DYE: CPT | Mod: 26

## 2023-12-21 PROCEDURE — 99233 SBSQ HOSP IP/OBS HIGH 50: CPT

## 2023-12-21 PROCEDURE — 93010 ELECTROCARDIOGRAM REPORT: CPT

## 2023-12-21 PROCEDURE — 99234 HOSP IP/OBS SM DT SF/LOW 45: CPT

## 2023-12-21 RX ORDER — INSULIN GLARGINE 100 [IU]/ML
45 INJECTION, SOLUTION SUBCUTANEOUS AT BEDTIME
Refills: 0 | Status: DISCONTINUED | OUTPATIENT
Start: 2023-12-21 | End: 2023-12-25

## 2023-12-21 RX ORDER — HEPARIN SODIUM 5000 [USP'U]/ML
INJECTION INTRAVENOUS; SUBCUTANEOUS
Qty: 25000 | Refills: 0 | Status: DISCONTINUED | OUTPATIENT
Start: 2023-12-21 | End: 2023-12-29

## 2023-12-21 RX ORDER — METOPROLOL TARTRATE 50 MG
5 TABLET ORAL ONCE
Refills: 0 | Status: COMPLETED | OUTPATIENT
Start: 2023-12-21 | End: 2023-12-21

## 2023-12-21 RX ORDER — DILTIAZEM HCL 120 MG
10 CAPSULE, EXT RELEASE 24 HR ORAL ONCE
Refills: 0 | Status: COMPLETED | OUTPATIENT
Start: 2023-12-21 | End: 2023-12-21

## 2023-12-21 RX ORDER — METOPROLOL TARTRATE 50 MG
5 TABLET ORAL EVERY 6 HOURS
Refills: 0 | Status: DISCONTINUED | OUTPATIENT
Start: 2023-12-21 | End: 2024-01-08

## 2023-12-21 RX ORDER — HEPARIN SODIUM 5000 [USP'U]/ML
10000 INJECTION INTRAVENOUS; SUBCUTANEOUS ONCE
Refills: 0 | Status: COMPLETED | OUTPATIENT
Start: 2023-12-21 | End: 2023-12-21

## 2023-12-21 RX ORDER — INSULIN LISPRO 100/ML
35 VIAL (ML) SUBCUTANEOUS
Refills: 0 | Status: DISCONTINUED | OUTPATIENT
Start: 2023-12-21 | End: 2023-12-27

## 2023-12-21 RX ORDER — HEPARIN SODIUM 5000 [USP'U]/ML
5000 INJECTION INTRAVENOUS; SUBCUTANEOUS EVERY 6 HOURS
Refills: 0 | Status: DISCONTINUED | OUTPATIENT
Start: 2023-12-21 | End: 2023-12-21

## 2023-12-21 RX ORDER — HEPARIN SODIUM 5000 [USP'U]/ML
5000 INJECTION INTRAVENOUS; SUBCUTANEOUS EVERY 6 HOURS
Refills: 0 | Status: DISCONTINUED | OUTPATIENT
Start: 2023-12-21 | End: 2023-12-29

## 2023-12-21 RX ORDER — FUROSEMIDE 40 MG
40 TABLET ORAL
Refills: 0 | Status: DISCONTINUED | OUTPATIENT
Start: 2023-12-21 | End: 2023-12-21

## 2023-12-21 RX ORDER — ACETAMINOPHEN 500 MG
1000 TABLET ORAL ONCE
Refills: 0 | Status: COMPLETED | OUTPATIENT
Start: 2023-12-21 | End: 2023-12-21

## 2023-12-21 RX ORDER — METOPROLOL TARTRATE 50 MG
5 TABLET ORAL ONCE
Refills: 0 | Status: DISCONTINUED | OUTPATIENT
Start: 2023-12-21 | End: 2023-12-21

## 2023-12-21 RX ORDER — METOPROLOL TARTRATE 50 MG
25 TABLET ORAL
Refills: 0 | Status: DISCONTINUED | OUTPATIENT
Start: 2023-12-21 | End: 2023-12-22

## 2023-12-21 RX ORDER — HEPARIN SODIUM 5000 [USP'U]/ML
10000 INJECTION INTRAVENOUS; SUBCUTANEOUS EVERY 6 HOURS
Refills: 0 | Status: DISCONTINUED | OUTPATIENT
Start: 2023-12-21 | End: 2023-12-29

## 2023-12-21 RX ORDER — HEPARIN SODIUM 5000 [USP'U]/ML
10000 INJECTION INTRAVENOUS; SUBCUTANEOUS EVERY 6 HOURS
Refills: 0 | Status: DISCONTINUED | OUTPATIENT
Start: 2023-12-21 | End: 2023-12-21

## 2023-12-21 RX ORDER — SODIUM CHLORIDE 9 MG/ML
250 INJECTION INTRAMUSCULAR; INTRAVENOUS; SUBCUTANEOUS ONCE
Refills: 0 | Status: COMPLETED | OUTPATIENT
Start: 2023-12-21 | End: 2023-12-21

## 2023-12-21 RX ORDER — LEVALBUTEROL 1.25 MG/.5ML
0.63 SOLUTION, CONCENTRATE RESPIRATORY (INHALATION) EVERY 6 HOURS
Refills: 0 | Status: DISCONTINUED | OUTPATIENT
Start: 2023-12-21 | End: 2024-01-08

## 2023-12-21 RX ORDER — HEPARIN SODIUM 5000 [USP'U]/ML
10000 INJECTION INTRAVENOUS; SUBCUTANEOUS ONCE
Refills: 0 | Status: DISCONTINUED | OUTPATIENT
Start: 2023-12-21 | End: 2023-12-21

## 2023-12-21 RX ORDER — FAMOTIDINE 10 MG/ML
20 INJECTION INTRAVENOUS DAILY
Refills: 0 | Status: DISCONTINUED | OUTPATIENT
Start: 2023-12-21 | End: 2024-01-08

## 2023-12-21 RX ORDER — INSULIN GLARGINE 100 [IU]/ML
45 INJECTION, SOLUTION SUBCUTANEOUS EVERY MORNING
Refills: 0 | Status: DISCONTINUED | OUTPATIENT
Start: 2023-12-22 | End: 2023-12-25

## 2023-12-21 RX ORDER — FUROSEMIDE 40 MG
40 TABLET ORAL DAILY
Refills: 0 | Status: DISCONTINUED | OUTPATIENT
Start: 2023-12-22 | End: 2023-12-26

## 2023-12-21 RX ORDER — HEPARIN SODIUM 5000 [USP'U]/ML
INJECTION INTRAVENOUS; SUBCUTANEOUS
Qty: 25000 | Refills: 0 | Status: DISCONTINUED | OUTPATIENT
Start: 2023-12-21 | End: 2023-12-21

## 2023-12-21 RX ADMIN — PIPERACILLIN AND TAZOBACTAM 25 GRAM(S): 4; .5 INJECTION, POWDER, LYOPHILIZED, FOR SOLUTION INTRAVENOUS at 21:59

## 2023-12-21 RX ADMIN — Medication 10 MILLIGRAM(S): at 17:56

## 2023-12-21 RX ADMIN — HEPARIN SODIUM 10000 UNIT(S): 5000 INJECTION INTRAVENOUS; SUBCUTANEOUS at 17:35

## 2023-12-21 RX ADMIN — HEPARIN SODIUM 5000 UNIT(S): 5000 INJECTION INTRAVENOUS; SUBCUTANEOUS at 06:34

## 2023-12-21 RX ADMIN — LACTULOSE 20 GRAM(S): 10 SOLUTION ORAL at 11:57

## 2023-12-21 RX ADMIN — Medication 5 MILLIGRAM(S): at 15:29

## 2023-12-21 RX ADMIN — Medication 40 MILLIGRAM(S): at 06:34

## 2023-12-21 RX ADMIN — ATORVASTATIN CALCIUM 80 MILLIGRAM(S): 80 TABLET, FILM COATED ORAL at 21:59

## 2023-12-21 RX ADMIN — HEPARIN SODIUM 2400 UNIT(S)/HR: 5000 INJECTION INTRAVENOUS; SUBCUTANEOUS at 17:41

## 2023-12-21 RX ADMIN — Medication 35 UNIT(S): at 17:33

## 2023-12-21 RX ADMIN — OXYCODONE HYDROCHLORIDE 5 MILLIGRAM(S): 5 TABLET ORAL at 18:20

## 2023-12-21 RX ADMIN — TIOTROPIUM BROMIDE 2 PUFF(S): 18 CAPSULE ORAL; RESPIRATORY (INHALATION) at 09:18

## 2023-12-21 RX ADMIN — INSULIN GLARGINE 45 UNIT(S): 100 INJECTION, SOLUTION SUBCUTANEOUS at 22:20

## 2023-12-21 RX ADMIN — HYDROMORPHONE HYDROCHLORIDE 1 MILLIGRAM(S): 2 INJECTION INTRAMUSCULAR; INTRAVENOUS; SUBCUTANEOUS at 22:38

## 2023-12-21 RX ADMIN — Medication 40 MILLIGRAM(S): at 21:59

## 2023-12-21 RX ADMIN — Medication 25 MILLIGRAM(S): at 16:53

## 2023-12-21 RX ADMIN — OXYCODONE HYDROCHLORIDE 60 MILLIGRAM(S): 5 TABLET ORAL at 16:54

## 2023-12-21 RX ADMIN — Medication 40 MILLIGRAM(S): at 13:12

## 2023-12-21 RX ADMIN — Medication 6: at 11:56

## 2023-12-21 RX ADMIN — HEPARIN SODIUM 5000 UNIT(S): 5000 INJECTION INTRAVENOUS; SUBCUTANEOUS at 13:12

## 2023-12-21 RX ADMIN — Medication 30 UNIT(S): at 11:56

## 2023-12-21 RX ADMIN — Medication 6: at 06:55

## 2023-12-21 RX ADMIN — Medication 0.25 MILLIGRAM(S): at 22:37

## 2023-12-21 RX ADMIN — Medication 400 MILLIGRAM(S): at 19:27

## 2023-12-21 RX ADMIN — PIPERACILLIN AND TAZOBACTAM 25 GRAM(S): 4; .5 INJECTION, POWDER, LYOPHILIZED, FOR SOLUTION INTRAVENOUS at 06:34

## 2023-12-21 RX ADMIN — OXYCODONE HYDROCHLORIDE 60 MILLIGRAM(S): 5 TABLET ORAL at 07:30

## 2023-12-21 RX ADMIN — Medication 0.25 MILLIGRAM(S): at 01:04

## 2023-12-21 RX ADMIN — HYDROMORPHONE HYDROCHLORIDE 1 MILLIGRAM(S): 2 INJECTION INTRAMUSCULAR; INTRAVENOUS; SUBCUTANEOUS at 23:38

## 2023-12-21 RX ADMIN — SODIUM CHLORIDE 250 MILLILITER(S): 9 INJECTION INTRAMUSCULAR; INTRAVENOUS; SUBCUTANEOUS at 19:40

## 2023-12-21 RX ADMIN — POLYETHYLENE GLYCOL 3350 17 GRAM(S): 17 POWDER, FOR SOLUTION ORAL at 11:57

## 2023-12-21 RX ADMIN — SENNA PLUS 2 TABLET(S): 8.6 TABLET ORAL at 22:27

## 2023-12-21 RX ADMIN — Medication 3 MILLILITER(S): at 09:28

## 2023-12-21 RX ADMIN — PIPERACILLIN AND TAZOBACTAM 25 GRAM(S): 4; .5 INJECTION, POWDER, LYOPHILIZED, FOR SOLUTION INTRAVENOUS at 13:12

## 2023-12-21 RX ADMIN — Medication 6: at 00:05

## 2023-12-21 RX ADMIN — BUDESONIDE AND FORMOTEROL FUMARATE DIHYDRATE 2 PUFF(S): 160; 4.5 AEROSOL RESPIRATORY (INHALATION) at 09:18

## 2023-12-21 RX ADMIN — ONDANSETRON 4 MILLIGRAM(S): 8 TABLET, FILM COATED ORAL at 16:54

## 2023-12-21 RX ADMIN — Medication 6: at 17:34

## 2023-12-21 RX ADMIN — Medication 3 MILLILITER(S): at 04:11

## 2023-12-21 NOTE — PROGRESS NOTE ADULT - NS ATTEND AMEND GEN_ALL_CORE FT
seen with above,    68F super morbid obesity (BMI >60), ENRIQUE/OHS, chronic respiratory failure, severe pulmonary HTN with RV dysfunction, CKD 3, HFpEF, recent admission 9/2023 with hypoxic respiratory failure unable to get CTPA or VQ scan and was empirically treated with Eliquis now readmitted for acute on chronic hypoxemic respiratory failure on treatment with IV diuresis and empiric Zosyn with Solumedrol, +RSV as well    -was on nasal BiPAP and been on HFNC x2 day with sat 90%, gradual improvement in respiratory status, repeat weight <400 lbs  -diuresing well on IV Lasix 40mg once daily, Cr. stable, continue to aim for net negative output (prior admission Cr. peak at 2.1), she does not feel ready mentally for R/LHC today, will continue optimize to reattempt next week when she will be able to come off HFNC  -may need addition of acetazolamide if further uptrend in bicarb  -prior SBP 90s, off amlodipine for now, may consider spironolactone or Entresto after cath if Cr. stable  -ABG with chronic CO2 retention, she is awake/alert and conversive, continue nocturnal BiPAP and pulmonary follow up.   -discontinued empiric Eliquis, normal D-dimer, need DVT ppx with heparin subQ   -need weight loss         Lenard Van DO, [facility]  Faculty Non-Invasive Cardiologist  [phone]. seen with above,    68F super morbid obesity (BMI >60), ENRIQUE/OHS, chronic respiratory failure, severe pulmonary HTN with RV dysfunction, CKD 3, HFpEF, recent admission 9/2023 with hypoxic respiratory failure unable to get CTPA or VQ scan and was empirically treated with Eliquis now readmitted for acute on chronic hypoxemic respiratory failure on treatment with IV diuresis and empiric Zosyn with Solumedrol, +RSV as well    -was on nasal BiPAP and been on HFNC x2 day with sat 90%, gradual improvement in respiratory status, repeat weight <400 lbs  -diuresing well on IV Lasix 40mg once daily, Cr. stable, continue to aim for net negative output (prior admission Cr. peak at 2.1), she does not feel ready mentally for R/LHC today, will continue optimize to reattempt next week when she will be able to come off HFNC  -may need addition of acetazolamide if further uptrend in bicarb  -prior SBP 90s, off amlodipine for now, may consider spironolactone or Entresto after cath if Cr. stable  -ABG with chronic CO2 retention, she is awake/alert and conversive, continue nocturnal BiPAP and pulmonary follow up.   -discontinued empiric Eliquis, normal D-dimer, need DVT ppx with heparin subQ   -need weight loss         Lenard Van DO, [facility]  Faculty Non-Invasive Cardiologist  [phone].

## 2023-12-21 NOTE — CHART NOTE - NSCHARTNOTEFT_GEN_A_CORE
INTERVAL HX: Called by RN for patient with new-onset afib on monitor   Patient is a 68 year old female with PMH of HTN, HLD, DM2, HFpEF, CKD III, DVT, Uterine CA, COPD on home o2; who presented to the ED with complaints of shortness of breath; admitted to medicine for acute on chronic respiratory failure secondary to superimposed bacterial Pneumonia in setting of RSV, acute HFpEF exacerbation, and pulmonary hypertension.   Patient was seen and evaluated at bedside. Patient with complaints of headache and chest pressure that started 10 minutes prior, and now resolved. Patient notes that the headache was a 5/10 severity in the back of her head, throbbing quality. Chest pressure was mild to moderate and now resolved. +mild nausea. Denies lightheadedness, dizziness, changes in vision, history of afib or other arrythmia, palpitations, SOB, dyspnea, abdominal pain.     VITAL  T(C): 36.7 orally  HR: 133 irregular   BP: 153/65  RR: 17 unlabored   SpO2: 96% on HFNC 40L FiO2 40%     PHYSICAL EXAM  CONSTITUTIONAL: Patient laying in bed comfortably, HFNC in place, no apparent distress  EYES: PERRLA and symmetric, EOMI, No conjunctival or scleral injection, non-icteric  RESP: No respiratory distress, no use of accessory muscles; +wheezing b/l. No rales or rhonchi   CV: irregularly irregular, +S1S2  NEURO: A&Ox4, no focal deficits     LABS              16.0   15.26 )-----------( 278      ( 12-21-23 @ 15:56 )             46.9     135  |  88  |  57.1  -------------------------<  303   12-21-23 @ 15:56  4.5  |  35.0  |  1.82    Ca      9.5     12-21-23 @ 15:56  Phos   3.2     12-21-23 @ 15:56  Mg     2.3     12-21-23 @ 15:56      PT/INR - ( 12-21-23 @ 15:56 )   PT: 11.4 sec;   INR: 1.03 ratio  PTT - ( 12-21-23 @ 15:56 )  PTT:26.8 sec    Troponin T, High Sensitivity Result: 34 (12.21.23 @ 15:56)     EKG - , Atrial fib, +ST depression noted in leads I and II. NEW CHANGES compared to previous EKG on 12/19/2023    A/P: 68 year old female with PMH of HTN, HLD, DM2, HFpEF, CKD III, DVT, Uterine CA, COPD on home o2; who presented to the ED with complaints of shortness of breath; admitted to medicine for acute on chronic respiratory failure secondary to superimposed bacterial Pneumonia in setting of RSV, acute HFpEF exacerbation, and pulmonary hypertension; seen today for new onset afib, rest VSS     New onset afib   -stat EKG with , Atrial fib, +ST depression noted in leads I and II. NEW CHANGES compared to previous EKG on 12/19/2023  -metoprolol 5mg IV push x1 stat   -metoprolol 25mg PO BID standing   -metoprolol 5mg IV push q6hrs PRN HR>130 sustaining   -heparin full anticoagulation nomogram ordered stat     CAD with unstable angina r/o NSTEMI   -EKG with above findings; cardio aware  -stat troponin wnl and stable    -cardiac cath planned for next week as per cardio   -PRN pain coverage     Headache, resolved   -no neuro changes   -PRN pain coverage for mild severity   -RN to notify provider with mod-severe severity headache and/or changes in clinical status     DISPO: remain on stepdown unit for q2 vitals   Discussed plan with Patient, RN, and [facility]iology NP. Dr. Henderson made aware. Patient demonstrated understanding of plan and all questions/concerns were addressed.   Will continue to monitor   RN to notify provider with any changes in patient status INTERVAL HX: Called by RN for patient with new-onset afib on monitor   Patient is a 68 year old female with PMH of HTN, HLD, DM2, HFpEF, CKD III, DVT, Uterine CA, COPD on home o2; who presented to the ED with complaints of shortness of breath; admitted to medicine for acute on chronic respiratory failure secondary to superimposed bacterial Pneumonia in setting of RSV, acute HFpEF exacerbation, and pulmonary hypertension.   Patient was seen and evaluated at bedside. Patient with complaints of headache and chest pressure that started 10 minutes prior, and now resolved. Patient notes that the headache was a 5/10 severity in the back of her head, throbbing quality. Chest pressure was mild to moderate and now resolved. +mild nausea. Denies lightheadedness, dizziness, changes in vision, history of afib or other arrythmia, palpitations, SOB, dyspnea, abdominal pain.     VITAL  T(C): 36.7 orally  HR: 133 irregular   BP: 153/65  RR: 17 unlabored   SpO2: 96% on HFNC 40L FiO2 40%     PHYSICAL EXAM  CONSTITUTIONAL: Patient laying in bed comfortably, HFNC in place, no apparent distress  EYES: PERRLA and symmetric, EOMI, No conjunctival or scleral injection, non-icteric  RESP: No respiratory distress, no use of accessory muscles; +wheezing b/l. No rales or rhonchi   CV: irregularly irregular, +S1S2  NEURO: A&Ox4, no focal deficits     LABS              16.0   15.26 )-----------( 278      ( 12-21-23 @ 15:56 )             46.9     135  |  88  |  57.1  -------------------------<  303   12-21-23 @ 15:56  4.5  |  35.0  |  1.82    Ca      9.5     12-21-23 @ 15:56  Phos   3.2     12-21-23 @ 15:56  Mg     2.3     12-21-23 @ 15:56      PT/INR - ( 12-21-23 @ 15:56 )   PT: 11.4 sec;   INR: 1.03 ratio  PTT - ( 12-21-23 @ 15:56 )  PTT:26.8 sec    Troponin T, High Sensitivity Result: 34 (12.21.23 @ 15:56)     EKG - , Atrial fib, +ST depression noted in leads I and II. NEW CHANGES compared to previous EKG on 12/19/2023    A/P: 68 year old female with PMH of HTN, HLD, DM2, HFpEF, CKD III, DVT, Uterine CA, COPD on home o2; who presented to the ED with complaints of shortness of breath; admitted to medicine for acute on chronic respiratory failure secondary to superimposed bacterial Pneumonia in setting of RSV, acute HFpEF exacerbation, and pulmonary hypertension; seen today for new onset afib, rest VSS     New onset afib   -stat EKG with , Atrial fib, +ST depression noted in leads I and II. NEW CHANGES compared to previous EKG on 12/19/2023  -metoprolol 5mg IV push x1 stat   -metoprolol 25mg PO BID standing   -metoprolol 5mg IV push q6hrs PRN HR>130 sustaining   -heparin full anticoagulation nomogram ordered stat     CAD with unstable angina r/o NSTEMI   -EKG with above findings; cardio aware  -stat troponin wnl and stable    -cardiac cath planned for next week as per cardio   -PRN pain coverage     Headache, resolved   -no neuro changes   -PRN pain coverage for mild severity   -RN to notify provider with mod-severe severity headache and/or changes in clinical status     AHRF 2/2 RSV/Bacterial PNA/ COPD/ pHTN  -duoneb discontinued and ordered xopenex   -rest of plan as per hospitalist     DISPO: remain on stepdown unit for q2 vitals   Discussed plan with Patient, RN, and Judith Cardiology NP. Dr. Henderson made aware. Patient demonstrated understanding of plan and all questions/concerns were addressed.   Will continue to monitor   RN to notify provider with any changes in patient status

## 2023-12-21 NOTE — CHART NOTE - NSCHARTNOTEFT_GEN_A_CORE
Called by RN for patient with headache w/ visual changes   Patient converted in afib during day shift, patient started on full AC w/ heparin gtt   Patient is a 68 year old female with PMH of HTN, HLD, DM2, HFpEF, CKD III, DVT, Uterine CA, COPD on home o2; who presented to the ED with complaints of shortness of breath; admitted to medicine for acute on chronic respiratory failure secondary to superimposed bacterial Pneumonia in setting of RSV, acute HFpEF exacerbation, and pulmonary hypertension.   Patient was seen and evaluated at bedside  Patient with complaints of 7/10 headache in the left side of her head   Patient states her vision is slightly blurry, patient also states she wears both reading glasses and long distance glasses   She also had a brief episode of seeing "black dots" that has now resolved   Denies lightheadedness, dizziness, SOB, dyspnea, abdominal pain    Vital Signs  T(F): 98.1   HR: 133   BP: 114/64  RR: 19  SpO2: 95%    Patient On (Oxygen Delivery Method): nasal cannula, high flow  O2 Flow (L/min): 40  O2 Concentration (%): 40    GENERAL: NAD, lying comfortably  HEAD: Atraumatic, Normocephalic  EYES: EOMI, PERRLA, conjunctiva and sclera clear  ENMT: Moist mucous membranes  NERVOUS SYSTEM: Alert and awake, Good concentration. Full sensation, strength intact. Answers questions appropriately, no slurred speech. No facial droop, tongue midline. Visual fields WNL with both eyes. Able to visualize and read clock and TV.    CHEST/LUNG: Decreased breath sounds b/l; Unlabored respirations  HEART: S1S2+, irregularly irregular  ABDOMEN: Soft, Nontender, Nondistended; BS+   EXTREMITIES:  2+ Peripheral Pulses    SKIN: Warm and dry        No neuro deficits on exam   Ofirmev 1g   STAT CT head   Patient titrated from High flow to NRB to go down for CT head, tolerating NRB with O2 saturation in mid 90's   Patient to go back on High Flow once CT head is completed    250cc bolus running   PRN lopressor for heart rate control   RN to notify with any acute changes Called by RN for patient with headache w/ visual changes   Patient converted in afib during day shift, patient started on full AC w/ heparin gtt   Patient is a 68 year old female with PMH of HTN, HLD, DM2, HFpEF, CKD III, DVT, Uterine CA, COPD on home o2; who presented to the ED with complaints of shortness of breath; admitted to medicine for acute on chronic respiratory failure secondary to superimposed bacterial Pneumonia in setting of RSV, acute HFpEF exacerbation, and pulmonary hypertension.   Patient was seen and evaluated at bedside  Patient with complaints of 7/10 headache in the left side of her head   Patient states her vision is slightly blurry, patient also states she wears both reading glasses and long distance glasses   She also had a brief episode of seeing "black dots" that has now resolved   Denies lightheadedness, dizziness, SOB, dyspnea, abdominal pain    Vital Signs  T(F): 98.1   HR: 133   BP: 114/64  RR: 19  SpO2: 95%    Patient On (Oxygen Delivery Method): nasal cannula, high flow  O2 Flow (L/min): 40  O2 Concentration (%): 40    GENERAL: NAD, lying comfortably  HEAD: Atraumatic, Normocephalic  EYES: EOMI, PERRLA, conjunctiva and sclera clear  ENMT: Moist mucous membranes  NERVOUS SYSTEM: Alert and awake, Good concentration. Full sensation, strength intact. Answers questions appropriately, no slurred speech. No facial droop, tongue midline. Visual fields WNL with both eyes. Able to visualize and read clock and TV.    CHEST/LUNG: Decreased breath sounds b/l; Unlabored respirations  HEART: S1S2+, irregularly irregular  ABDOMEN: Soft, Nontender, Nondistended; BS+   EXTREMITIES:  2+ Peripheral Pulses    SKIN: Warm and dry        No neuro deficits on exam   Ofirmev 1g   STAT CT head   Patient titrated from High flow to NRB to go down for CT head, tolerating NRB with O2 saturation in mid 90's   Patient to go back on High Flow once CT head is completed    250cc bolus running   PRN lopressor for heart rate control   RN to notify with any acute changes    Addendum  Patient's HR steady in the 130's, likely Aflutter   Patient remains asymptomatic   Discussed with cardio PA, HR in the 130's is okay as long as patient remains asymptomatic   Made NPO for possible cardioversion in the AM   C/w heparin gtt    CT head with no acute intracranial findings. Called by RN for patient with headache w/ visual changes   Patient converted in afib during day shift, patient started on full AC w/ heparin gtt   Patient is a 68 year old female with PMH of HTN, HLD, DM2, HFpEF, CKD III, DVT, Uterine CA, COPD on home o2; who presented to the ED with complaints of shortness of breath; admitted to medicine for acute on chronic respiratory failure secondary to superimposed bacterial Pneumonia in setting of RSV, acute HFpEF exacerbation, and pulmonary hypertension.   Patient was seen and evaluated at bedside  Patient with complaints of 7/10 headache in the left side of her head   Patient states her vision is slightly blurry, patient also states she wears both reading glasses and long distance glasses   She also had a brief episode of seeing "black dots" that has now resolved   Denies lightheadedness, dizziness, SOB, dyspnea, abdominal pain    Vital Signs  T(F): 98.1   HR: 133   BP: 114/64  RR: 19  SpO2: 95%    Patient On (Oxygen Delivery Method): nasal cannula, high flow  O2 Flow (L/min): 40  O2 Concentration (%): 40    GENERAL: NAD, lying comfortably  HEAD: Atraumatic, Normocephalic  EYES: EOMI, PERRLA, conjunctiva and sclera clear  ENMT: Moist mucous membranes  NERVOUS SYSTEM: Alert and awake, Good concentration. Full sensation, strength intact. Answers questions appropriately, no slurred speech. No facial droop, tongue midline. Visual fields WNL with both eyes. Able to visualize and read clock and TV.    CHEST/LUNG: Decreased breath sounds b/l; Unlabored respirations  HEART: S1S2+, irregularly irregular  ABDOMEN: Soft, Nontender, Nondistended; BS+   EXTREMITIES:  2+ Peripheral Pulses    SKIN: Warm and dry        No neuro deficits on exam   Ofirmev 1g   STAT CT head   Patient titrated from High flow to NRB to go down for CT head, tolerating NRB with O2 saturation in mid 90's   Patient to go back on High Flow once CT head is completed    250cc bolus running   PRN lopressor for heart rate control   RN to notify with any acute changes    Addendum  Patient's HR steady in the 130's, Aflutter   Patient remains asymptomatic   Discussed with cardio PA, HR in the 130's is okay as long as patient remains asymptomatic   Made NPO for possible cardioversion in the AM   C/w heparin gtt    CT head with no acute intracranial findings.

## 2023-12-21 NOTE — PROGRESS NOTE ADULT - PROBLEM SELECTOR PLAN 1
- Pt is still fluid overloaded   - BUN/creatinine still elevated. Monitor BMP daily   - GDMT: C/w lasix 40 mg BID IV push. ACEi/ARBs on hold due to JACQUES.   - Currently hypotensive, amlodipine on hold. Will consider spironolactone or entresto upon d/c and once SBP is above 100.   - Monitor on telemetry.  Strict i/o and daily weights.  Keep K > 4, Mg > 2.  Monitor renal function with ongoing diuresis.   - Keep NPO for LHC/RHC today.   - Actual bed weight is 176.8 kg.   - Eliquis is on hold, D-Dimer is <150. Heparin subcutaneous for DVT ppx.

## 2023-12-21 NOTE — CHART NOTE - NSCHARTNOTEFT_GEN_A_CORE
Notified by SOPHIE Almanza regarding patient's change in rhythm on telemetry. Repeat EKG showed AFib. [id number]. Recommend heparin drip full anticoagulation nomogram for anticoagulation. Recommend to start metoprolol 25 mg BID PO, if HR above 150s sustained. Cardiology will continue to follow. Monitor on telemetry. Notified by SOPHIE Almanza regarding patient's change in rhythm on telemetry. Repeat EKG showed AFib. [id number] 7. Recommend heparin drip full anticoagulation nomogram for anticoagulation. Recommend to start metoprolol 25 mg BID PO, if HR above 150s sustained. Cardiology will continue to follow. Monitor on telemetry.

## 2023-12-21 NOTE — ED ADULT NURSE NOTE - SUICIDE SCREENING QUESTION 3
Lipid abnormalities are improving with treatment.  Nutritional counseling was provided. and Pharmacotherapy as ordered.  Lipids will be reassessed in 6 months.   No

## 2023-12-21 NOTE — PROGRESS NOTE ADULT - NUTRITIONAL ASSESSMENT
This patient has been assessed with a concern for Malnutrition and has been determined to have a diagnosis/diagnoses of Morbid obesity (BMI > 40).    This patient is being managed with:   Diet Consistent Carbohydrate w/Evening Snack-  1500mL Fluid Restriction ([id number])  Low Sodium  Entered: Dec 19 2023  6:23PM   This patient has been assessed with a concern for Malnutrition and has been determined to have a diagnosis/diagnoses of Morbid obesity (BMI > 40).    This patient is being managed with:   Diet Consistent Carbohydrate w/Evening Snack-  1500mL Fluid Restriction ([id number])  Low Sodium  Entered: Dec 19 2023  6:23PM

## 2023-12-21 NOTE — PROGRESS NOTE ADULT - ASSESSMENT
69 y/o female with hx of HFpEF (EF 55-60% 8/2023), on home O2 4L, pulmonary HTN, was on Eliquis for presumed PE and severe pulmonary hypertension (PASP of 70 mmHg). super morbid obesity, asthma, CKD3, IDDM2, uterine cancer s/p MEGAN, DVT LLE (2004), chronic leg edema 2/2 venous stasis, spinal stenosis with chronic back pain who presents with midsternal chest pressure and shortness of breath.  Probnp 1650  trop 45 (flat curve)  Ekg no ischemic changes TTE EF 60% grade 2 dd moderated reduced Rv systolic function rsvp 70mmhg  CXR with chf     67 y/o female with hx of HFpEF (EF 55-60% 8/2023), on home O2 4L, pulmonary HTN, was on Eliquis for presumed PE and severe pulmonary hypertension (PASP of 70 mmHg). super morbid obesity, asthma, CKD3, IDDM2, uterine cancer s/p MEGAN, DVT LLE (2004), chronic leg edema 2/2 venous stasis, spinal stenosis with chronic back pain who presents with midsternal chest pressure and shortness of breath.  Probnp 1650  trop 45 (flat curve)  Ekg no ischemic changes TTE EF 60% grade 2 dd moderated reduced Rv systolic function rsvp 70mmhg  CXR with chf

## 2023-12-21 NOTE — PROGRESS NOTE ADULT - SUBJECTIVE AND OBJECTIVE BOX
INTERVAL EVENTS:  Follow up diabetes management  Steroid induced hyperglycemic    ROS: No acute pain at time of exam    MEDICATIONS  (STANDING):  albuterol/ipratropium for Nebulization 3 milliLiter(s) Nebulizer every 6 hours  atorvastatin 80 milliGRAM(s) Oral at bedtime  budesonide  80 MICROgram(s)/formoterol 4.5 MICROgram(s) Inhaler 2 Puff(s) Inhalation two times a day  dextrose 5%. 1000 milliLiter(s) (50 mL/Hr) IV Continuous <Continuous>  dextrose 5%. 1000 milliLiter(s) (100 mL/Hr) IV Continuous <Continuous>  dextrose 50% Injectable 25 Gram(s) IV Push once  dextrose 50% Injectable 12.5 Gram(s) IV Push once  dextrose 50% Injectable 25 Gram(s) IV Push once  glucagon  Injectable 1 milliGRAM(s) IntraMuscular once  heparin   Injectable 5000 Unit(s) SubCutaneous every 8 hours  insulin glargine Injectable (LANTUS) 45 Unit(s) SubCutaneous at bedtime  insulin lispro (ADMELOG) corrective regimen sliding scale   SubCutaneous every 6 hours  insulin lispro Injectable (ADMELOG) 35 Unit(s) SubCutaneous three times a day before meals  lactulose Syrup 20 Gram(s) Oral daily  methylPREDNISolone sodium succinate Injectable 40 milliGRAM(s) IV Push every 8 hours  oxyCODONE  ER Tablet 60 milliGRAM(s) Oral every 12 hours  piperacillin/tazobactam IVPB.- 3.375 Gram(s) IV Intermittent once  piperacillin/tazobactam IVPB.. 3.375 Gram(s) IV Intermittent every 8 hours  polyethylene glycol 3350 17 Gram(s) Oral daily  senna 2 Tablet(s) Oral at bedtime  tiotropium 2.5 MICROgram(s) Inhaler 2 Puff(s) Inhalation daily    MEDICATIONS  (PRN):  acetaminophen     Tablet .. 650 milliGRAM(s) Oral every 6 hours PRN Temp greater or equal to 38C (100.4F), Mild Pain (1 - 3)  albuterol    90 MICROgram(s) HFA Inhaler 2 Puff(s) Inhalation every 2 hours PRN Shortness of Breath and/or Wheezing  ALPRAZolam 0.25 milliGRAM(s) Oral every 8 hours PRN for anxiety/Sleep  dextrose Oral Gel 15 Gram(s) Oral once PRN Blood Glucose LESS THAN 70 milliGRAM(s)/deciliter  diphenhydrAMINE 25 milliGRAM(s) Oral every 6 hours PRN Rash and/or Itching  hydrALAZINE Injectable 10 milliGRAM(s) IV Push every 4 hours PRN for systolic bp > 160  HYDROmorphone  Injectable 1 milliGRAM(s) IV Push every 4 hours PRN breakthrough  pain  HYDROmorphone  Injectable 0.5 milliGRAM(s) IV Push every 6 hours PRN Severe Pain (7 - 10)  melatonin 3 milliGRAM(s) Oral at bedtime PRN Insomnia  ondansetron Injectable 4 milliGRAM(s) IV Push every 8 hours PRN Nausea and/or Vomiting  oxyCODONE    IR 5 milliGRAM(s) Oral every 6 hours PRN Moderate Pain (4 - 6)    Allergies  wool- rash, itch (Other)  adhesives (Rash)  latex (Rash)  Bactrim (Flushing)    Vital Signs Last 24 Hrs  T(C): 36.7 (21 Dec 2023 04:15), Max: 37.2 (20 Dec 2023 20:00)  T(F): 98.1 (21 Dec 2023 04:15), Max: 99 (20 Dec 2023 20:00)  HR: 72 (21 Dec 2023 09:22) (72 - 87)  BP: 157/75 (21 Dec 2023 08:10) (104/62 - 157/75)  BP(mean): 93 (21 Dec 2023 08:10) (67 - 93)  RR: 17 (21 Dec 2023 08:10) (17 - 18)  SpO2: 91% (21 Dec 2023 09:22) (90% - 99%)    Parameters below as of 21 Dec 2023 09:22  Patient On (Oxygen Delivery Method): nasal cannula, high flow    PHYSICAL EXAM:  General: No apparent distress, obese  Neck: Supple, trachea midline, no thyromegaly  Respiratory: Lungs clear bilaterally, normal rate, effort  Cardiac: +S1, S2, no m/r/g  GI: +BS, soft, non tender, non distended  Extremities: No peripheral edema, no pedal lesions  Neuro: A+O X3, no tremor    LABS:                        14.0   11.67 )-----------( 233      ( 20 Dec 2023 07:40 )             42.3     12-21    138  |  91<L>  |  62.4<H>  ----------------------------<  271<H>  4.6   |  33.0<H>  |  1.57<H>    Ca    9.5      21 Dec 2023 05:59  Mg     2.3     12-21      Urinalysis Basic - ( 21 Dec 2023 05:59 )    Color: x / Appearance: x / SG: x / pH: x  Gluc: 271 mg/dL / Ketone: x  / Bili: x / Urobili: x   Blood: x / Protein: x / Nitrite: x   Leuk Esterase: x / RBC: x / WBC x   Sq Epi: x / Non Sq Epi: x / Bacteria: x    POCT Blood Glucose.: 283 mg/dL (12-21-23 @ 11:54)  POCT Blood Glucose.: 265 mg/dL (12-21-23 @ 06:52)  POCT Blood Glucose.: 268 mg/dL (12-20-23 @ 23:55)  POCT Blood Glucose.: 290 mg/dL (12-20-23 @ 22:10)  POCT Blood Glucose.: 294 mg/dL (12-20-23 @ 18:10)  POCT Blood Glucose.: 387 mg/dL (12-20-23 @ 13:02)    Thyroid Stimulating Hormone, Serum: 1.51 uIU/mL (12-16-23 @ 15:20)  Thyroid Stimulating Hormone, Serum: 3.02 uIU/mL (12-16-23 @ 10:18)   INTERVAL EVENTS:  Follow up diabetes management  Steroid induced hyperglycemia    ROS: No acute pain at time of exam    MEDICATIONS  (STANDING):  albuterol/ipratropium for Nebulization 3 milliLiter(s) Nebulizer every 6 hours  atorvastatin 80 milliGRAM(s) Oral at bedtime  budesonide  80 MICROgram(s)/formoterol 4.5 MICROgram(s) Inhaler 2 Puff(s) Inhalation two times a day  dextrose 5%. 1000 milliLiter(s) (50 mL/Hr) IV Continuous <Continuous>  dextrose 5%. 1000 milliLiter(s) (100 mL/Hr) IV Continuous <Continuous>  dextrose 50% Injectable 25 Gram(s) IV Push once  dextrose 50% Injectable 12.5 Gram(s) IV Push once  dextrose 50% Injectable 25 Gram(s) IV Push once  glucagon  Injectable 1 milliGRAM(s) IntraMuscular once  heparin   Injectable 5000 Unit(s) SubCutaneous every 8 hours  insulin glargine Injectable (LANTUS) 45 Unit(s) SubCutaneous at bedtime  insulin lispro (ADMELOG) corrective regimen sliding scale   SubCutaneous every 6 hours  insulin lispro Injectable (ADMELOG) 35 Unit(s) SubCutaneous three times a day before meals  lactulose Syrup 20 Gram(s) Oral daily  methylPREDNISolone sodium succinate Injectable 40 milliGRAM(s) IV Push every 8 hours  oxyCODONE  ER Tablet 60 milliGRAM(s) Oral every 12 hours  piperacillin/tazobactam IVPB.- 3.375 Gram(s) IV Intermittent once  piperacillin/tazobactam IVPB.. 3.375 Gram(s) IV Intermittent every 8 hours  polyethylene glycol 3350 17 Gram(s) Oral daily  senna 2 Tablet(s) Oral at bedtime  tiotropium 2.5 MICROgram(s) Inhaler 2 Puff(s) Inhalation daily    MEDICATIONS  (PRN):  acetaminophen     Tablet .. 650 milliGRAM(s) Oral every 6 hours PRN Temp greater or equal to 38C (100.4F), Mild Pain (1 - 3)  albuterol    90 MICROgram(s) HFA Inhaler 2 Puff(s) Inhalation every 2 hours PRN Shortness of Breath and/or Wheezing  ALPRAZolam 0.25 milliGRAM(s) Oral every 8 hours PRN for anxiety/Sleep  dextrose Oral Gel 15 Gram(s) Oral once PRN Blood Glucose LESS THAN 70 milliGRAM(s)/deciliter  diphenhydrAMINE 25 milliGRAM(s) Oral every 6 hours PRN Rash and/or Itching  hydrALAZINE Injectable 10 milliGRAM(s) IV Push every 4 hours PRN for systolic bp > 160  HYDROmorphone  Injectable 1 milliGRAM(s) IV Push every 4 hours PRN breakthrough  pain  HYDROmorphone  Injectable 0.5 milliGRAM(s) IV Push every 6 hours PRN Severe Pain (7 - 10)  melatonin 3 milliGRAM(s) Oral at bedtime PRN Insomnia  ondansetron Injectable 4 milliGRAM(s) IV Push every 8 hours PRN Nausea and/or Vomiting  oxyCODONE    IR 5 milliGRAM(s) Oral every 6 hours PRN Moderate Pain (4 - 6)    Allergies  wool- rash, itch (Other)  adhesives (Rash)  latex (Rash)  Bactrim (Flushing)    Vital Signs Last 24 Hrs  T(C): 36.7 (21 Dec 2023 04:15), Max: 37.2 (20 Dec 2023 20:00)  T(F): 98.1 (21 Dec 2023 04:15), Max: 99 (20 Dec 2023 20:00)  HR: 72 (21 Dec 2023 09:22) (72 - 87)  BP: 157/75 (21 Dec 2023 08:10) (104/62 - 157/75)  BP(mean): 93 (21 Dec 2023 08:10) (67 - 93)  RR: 17 (21 Dec 2023 08:10) (17 - 18)  SpO2: 91% (21 Dec 2023 09:22) (90% - 99%)    Parameters below as of 21 Dec 2023 09:22  Patient On (Oxygen Delivery Method): nasal cannula, high flow    PHYSICAL EXAM:  General: No apparent distress, obese  Neck: Supple, trachea midline, no thyromegaly  Respiratory: Lungs clear bilaterally, normal rate, effort  Cardiac: +S1, S2, no m/r/g  GI: +BS, soft, non tender, non distended  Extremities: No peripheral edema, no pedal lesions  Neuro: A+O X3, no tremor    LABS:                        14.0   11.67 )-----------( 233      ( 20 Dec 2023 07:40 )             42.3     12-21    138  |  91<L>  |  62.4<H>  ----------------------------<  271<H>  4.6   |  33.0<H>  |  1.57<H>    Ca    9.5      21 Dec 2023 05:59  Mg     2.3     12-21      Urinalysis Basic - ( 21 Dec 2023 05:59 )    Color: x / Appearance: x / SG: x / pH: x  Gluc: 271 mg/dL / Ketone: x  / Bili: x / Urobili: x   Blood: x / Protein: x / Nitrite: x   Leuk Esterase: x / RBC: x / WBC x   Sq Epi: x / Non Sq Epi: x / Bacteria: x    POCT Blood Glucose.: 283 mg/dL (12-21-23 @ 11:54)  POCT Blood Glucose.: 265 mg/dL (12-21-23 @ 06:52)  POCT Blood Glucose.: 268 mg/dL (12-20-23 @ 23:55)  POCT Blood Glucose.: 290 mg/dL (12-20-23 @ 22:10)  POCT Blood Glucose.: 294 mg/dL (12-20-23 @ 18:10)  POCT Blood Glucose.: 387 mg/dL (12-20-23 @ 13:02)    Thyroid Stimulating Hormone, Serum: 1.51 uIU/mL (12-16-23 @ 15:20)  Thyroid Stimulating Hormone, Serum: 3.02 uIU/mL (12-16-23 @ 10:18)

## 2023-12-21 NOTE — PROGRESS NOTE ADULT - PROBLEM SELECTOR PLAN 2
- RSV positive with secondary Resp infection   - c/w zosyn   - c/w tiotropium, solumedrol, and formoterol  - bipap weaned off, currently on HFNC 40L/40%   - care per primary team.

## 2023-12-21 NOTE — PROGRESS NOTE ADULT - ASSESSMENT
67y/o female    1- acute on chroinic resp  failure hypoxic  2- +  RSV  COPD  3- RUL pneumonia ? superimposed on  RSV   4-h/o  DVT/PE  5- new afib  HF pEF  6- severe pulm htn   PASP 70 mmg Hg   7- CKD 3    - IV steroids  -zosyn   - MRSA  swab    - nebulzier  - high flow   - LHC/RHC  was not ready today --  -follow up    d dimer     crp procalcitonin   -sputum culture  - change  to xopenex   - PPI    will follow    Thank you kindly for allowing us to participate in this patients care.    Please  feel free to reach out with any questions or suggestions    MAKENNA EUBANKS    PULMONARY and CRITICAL CARE   Health system Physician [facility]     [phone]     67y/o female    1- acute on chroinic resp  failure hypoxic  2- +  RSV  COPD  3- RUL pneumonia ? superimposed on  RSV   4-h/o  DVT/PE  5- new afib  HF pEF  6- severe pulm htn   PASP 70 mmg Hg   7- CKD 3    - IV steroids  -zosyn   - MRSA  swab    - nebulzier  - high flow   - LHC/RHC  was not ready today --  -follow up    d dimer     crp procalcitonin   -sputum culture  - change  to xopenex   - PPI    will follow    Thank you kindly for allowing us to participate in this patients care.    Please  feel free to reach out with any questions or suggestions    MAKENNA EUBANKS    PULMONARY and CRITICAL CARE   [facility] Physician [facility]     [phone]

## 2023-12-21 NOTE — PROGRESS NOTE ADULT - SUBJECTIVE AND OBJECTIVE BOX
seen for rsv, resp failure    feels well  HFNC on 40%  still with cough  ros negative     MEDICATIONS  (STANDING):  albuterol/ipratropium for Nebulization 3 milliLiter(s) Nebulizer every 6 hours  atorvastatin 80 milliGRAM(s) Oral at bedtime  budesonide  80 MICROgram(s)/formoterol 4.5 MICROgram(s) Inhaler 2 Puff(s) Inhalation two times a day  dextrose 5%. 1000 milliLiter(s) (50 mL/Hr) IV Continuous <Continuous>  dextrose 5%. 1000 milliLiter(s) (100 mL/Hr) IV Continuous <Continuous>  dextrose 50% Injectable 25 Gram(s) IV Push once  dextrose 50% Injectable 12.5 Gram(s) IV Push once  dextrose 50% Injectable 25 Gram(s) IV Push once  glucagon  Injectable 1 milliGRAM(s) IntraMuscular once  heparin   Injectable 5000 Unit(s) SubCutaneous every 8 hours  insulin glargine Injectable (LANTUS) 45 Unit(s) SubCutaneous at bedtime  insulin lispro (ADMELOG) corrective regimen sliding scale   SubCutaneous every 6 hours  insulin lispro Injectable (ADMELOG) 35 Unit(s) SubCutaneous three times a day before meals  lactulose Syrup 20 Gram(s) Oral daily  methylPREDNISolone sodium succinate Injectable 40 milliGRAM(s) IV Push every 8 hours  oxyCODONE  ER Tablet 60 milliGRAM(s) Oral every 12 hours  piperacillin/tazobactam IVPB.- 3.375 Gram(s) IV Intermittent once  piperacillin/tazobactam IVPB.. 3.375 Gram(s) IV Intermittent every 8 hours  polyethylene glycol 3350 17 Gram(s) Oral daily  senna 2 Tablet(s) Oral at bedtime  tiotropium 2.5 MICROgram(s) Inhaler 2 Puff(s) Inhalation daily    MEDICATIONS  (PRN):  acetaminophen     Tablet .. 650 milliGRAM(s) Oral every 6 hours PRN Temp greater or equal to 38C (100.4F), Mild Pain (1 - 3)  albuterol    90 MICROgram(s) HFA Inhaler 2 Puff(s) Inhalation every 2 hours PRN Shortness of Breath and/or Wheezing  ALPRAZolam 0.25 milliGRAM(s) Oral every 8 hours PRN for anxiety/Sleep  dextrose Oral Gel 15 Gram(s) Oral once PRN Blood Glucose LESS THAN 70 milliGRAM(s)/deciliter  diphenhydrAMINE 25 milliGRAM(s) Oral every 6 hours PRN Rash and/or Itching  hydrALAZINE Injectable 10 milliGRAM(s) IV Push every 4 hours PRN for systolic bp > 160  HYDROmorphone  Injectable 0.5 milliGRAM(s) IV Push every 6 hours PRN Severe Pain (7 - 10)  HYDROmorphone  Injectable 1 milliGRAM(s) IV Push every 4 hours PRN breakthrough  pain  melatonin 3 milliGRAM(s) Oral at bedtime PRN Insomnia  ondansetron Injectable 4 milliGRAM(s) IV Push every 8 hours PRN Nausea and/or Vomiting  oxyCODONE    IR 5 milliGRAM(s) Oral every 6 hours PRN Moderate Pain (4 - 6)      Allergies    wool- rash, itch (Other)  adhesives (Rash)  latex (Rash)  Bactrim (Flushing)      Vital Signs Last 24 Hrs  T(C): 36.7 (21 Dec 2023 04:15), Max: 37.2 (20 Dec 2023 20:00)  T(F): 98.1 (21 Dec 2023 04:15), Max: 99 (20 Dec 2023 20:00)  HR: 80 (21 Dec 2023 12:00) (72 - 87)  BP: 137/65 (21 Dec 2023 12:00) (104/62 - 157/75)  BP(mean): 78 (21 Dec 2023 12:00) (67 - 93)  RR: 18 (21 Dec 2023 12:00) (17 - 18)  SpO2: 93% (21 Dec 2023 12:00) (90% - 99%)    Parameters below as of 21 Dec 2023 12:00  Patient On (Oxygen Delivery Method): nasal cannula, high flow  O2 Flow (L/min): 40  O2 Concentration (%): 40    PHYSICAL EXAM:    GENERAL: NAD obese   CHEST/LUNG: infrequent wheeze/ diminished bs   HEART: Regular rate and rhythm; S1 S2  ABDOMEN: Soft, Nontender,  Bowel sounds present  EXTREMITIES: lymphedema  NERVOUS SYSTEM:  Alert & Oriented X3,  Motor Strength 5/5 B/L upper and lower extremities  PSYCH: normal mood, appropriate response.    LABS:                        14.0   11.67 )-----------( 233      ( 20 Dec 2023 07:40 )             42.3     12-21    138  |  91<L>  |  62.4<H>  ----------------------------<  271<H>  4.6   |  33.0<H>  |  1.57<H>    Ca    9.5      21 Dec 2023 05:59  Mg     2.3     12-21        Urinalysis Basic - ( 21 Dec 2023 05:59 )    Color: x / Appearance: x / SG: x / pH: x  Gluc: 271 mg/dL / Ketone: x  / Bili: x / Urobili: x   Blood: x / Protein: x / Nitrite: x   Leuk Esterase: x / RBC: x / WBC x   Sq Epi: x / Non Sq Epi: x / Bacteria: x        CAPILLARY BLOOD GLUCOSE      POCT Blood Glucose.: 283 mg/dL (21 Dec 2023 11:54)  POCT Blood Glucose.: 265 mg/dL (21 Dec 2023 06:52)  POCT Blood Glucose.: 268 mg/dL (20 Dec 2023 23:55)  POCT Blood Glucose.: 290 mg/dL (20 Dec 2023 22:10)  POCT Blood Glucose.: 294 mg/dL (20 Dec 2023 18:10)        RADIOLOGY & ADDITIONAL TESTS:

## 2023-12-21 NOTE — PROGRESS NOTE ADULT - ASSESSMENT
68F with obesity, HTN, HLD, DM2, HFpEF, CKD III, DVT, Uterine CA, COPD on oxygen who presented with complaints of shortness of breath, found to be RSV+. Patient had a CT scan which also showed pneumonia and pulmonary HTN.    Consulted for diabetes management  Home regimen: Lantus 30 units in morning/38 units in evening, lispro 20-25 units with meals  Current a1c: 8.3%    1. Uncontrolled DM with steroid induced hyperglycemia  - Increase lantus and change to BID dosing for better absorption  - Increase to lantus 45 units BID  - Increase premeal admelog to 30 units TID with meals  - Sliding scale coverage    2. Acute on chronic respiratory failure/RSV/PNA  - IV hydrocortisone  - Continue IV abx    3. HLD  - Continue statin

## 2023-12-21 NOTE — PROGRESS NOTE ADULT - SUBJECTIVE AND OBJECTIVE BOX
[facility]                                                         CARDIOLOGY AT [facility]                                                                  [address] [address]                                                         Telephone: [phone]. Fax:[phone]                                                                             PROGRESS NOTE    Reason for follow up: RSV +, HFpEF   Update: Pt is on HFNC 40L/40%. Patient is currently NPO since midnight for LHC/RHC.       Review of symptoms:   Cardiac:  No chest pain. No dyspnea. No palpitations.  Respiratory: no cough. No dyspnea  Gastrointestinal: No diarrhea. No abdominal pain. No bleeding.   Neuro: No focal neuro complaints.    Vitals:  T(C): 36.7 (12-21-23 @ 04:15), Max: 37.2 (12-20-23 @ 20:00)  HR: 76 (12-21-23 @ 06:00) (74 - 87)  BP: 104/62 (12-21-23 @ 06:00) (104/62 - 154/75)  RR: 18 (12-21-23 @ 06:00) (18 - 20)  SpO2: 98% (12-21-23 @ 06:00) (92% - 99%)  Wt(kg): --  I&O's Summary    20 Dec 2023 07:01  -  21 Dec 2023 07:00  --------------------------------------------------------  IN: 1140 mL / OUT: 3075 mL / NET: -1935 mL      Weight (kg): 177.4 (12-16 @ 10:02)    PHYSICAL EXAM:  Appearance: Comfortable. No acute distress. super morbidly obese.   HEENT:  Atraumatic. Normocephalic.  Normal oral mucosa  Neurologic: A & O x 3, no gross focal deficits.  Cardiovascular: RRR S1 S2, No murmur, no rubs/gallops. No JVD  Respiratory: B/L wheezing. unlabored. HFNC.   Gastrointestinal:  Soft, Non-tender, + BS  Lower Extremities: 2+ Peripheral Pulses, No clubbing, cyanosis. + 2 B/L LE edema.   Psychiatry: Patient is calm. No agitation.   Skin: warm and dry.    CURRENT CARDIAC MEDICATIONS:  hydrALAZINE Injectable 10 milliGRAM(s) IV Push every 4 hours PRN      CURRENT OTHER MEDICATIONS:  albuterol    90 MICROgram(s) HFA Inhaler 2 Puff(s) Inhalation every 2 hours PRN Shortness of Breath and/or Wheezing  albuterol/ipratropium for Nebulization 3 milliLiter(s) Nebulizer every 6 hours  budesonide  80 MICROgram(s)/formoterol 4.5 MICROgram(s) Inhaler 2 Puff(s) Inhalation two times a day  tiotropium 2.5 MICROgram(s) Inhaler 2 Puff(s) Inhalation daily  piperacillin/tazobactam IVPB.- 3.375 Gram(s) IV Intermittent once  piperacillin/tazobactam IVPB.. 3.375 Gram(s) IV Intermittent every 8 hours  acetaminophen     Tablet .. 650 milliGRAM(s) Oral every 6 hours PRN Temp greater or equal to 38C (100.4F), Mild Pain (1 - 3)  ALPRAZolam 0.25 milliGRAM(s) Oral every 8 hours PRN for anxiety/Sleep  diphenhydrAMINE 25 milliGRAM(s) Oral every 6 hours PRN Rash and/or Itching  HYDROmorphone  Injectable 0.5 milliGRAM(s) IV Push every 6 hours PRN Severe Pain (7 - 10)  HYDROmorphone  Injectable 1 milliGRAM(s) IV Push every 4 hours PRN breakthrough  pain  melatonin 3 milliGRAM(s) Oral at bedtime PRN Insomnia  ondansetron Injectable 4 milliGRAM(s) IV Push every 8 hours PRN Nausea and/or Vomiting  oxyCODONE    IR 5 milliGRAM(s) Oral every 6 hours PRN Moderate Pain (4 - 6)  oxyCODONE  ER Tablet 60 milliGRAM(s) Oral every 12 hours  lactulose Syrup 20 Gram(s) Oral daily  polyethylene glycol 3350 17 Gram(s) Oral daily  senna 2 Tablet(s) Oral at bedtime  atorvastatin 80 milliGRAM(s) Oral at bedtime  dextrose 50% Injectable 25 Gram(s) IV Push once, Stop order after: 1 Doses  dextrose 50% Injectable 12.5 Gram(s) IV Push once, Stop order after: 1 Doses  dextrose 50% Injectable 25 Gram(s) IV Push once, Stop order after: 1 Doses  dextrose Oral Gel 15 Gram(s) Oral once, Stop order after: 1 Doses PRN Blood Glucose LESS THAN 70 milliGRAM(s)/deciliter  glucagon  Injectable 1 milliGRAM(s) IntraMuscular once, Stop order after: 1 Doses  insulin glargine Injectable (LANTUS) 80 Unit(s) SubCutaneous at bedtime  insulin lispro (ADMELOG) corrective regimen sliding scale   SubCutaneous every 6 hours  insulin lispro Injectable (ADMELOG) 30 Unit(s) SubCutaneous three times a day before meals  methylPREDNISolone sodium succinate Injectable 40 milliGRAM(s) IV Push every 8 hours  dextrose 5%. 1000 milliLiter(s) (50 mL/Hr) IV Continuous <Continuous>  dextrose 5%. 1000 milliLiter(s) (100 mL/Hr) IV Continuous <Continuous>  heparin   Injectable 5000 Unit(s) SubCutaneous every 8 hours      LABS:	 	                            14.0   11.67 )-----------( 233      ( 20 Dec 2023 07:40 )             42.3     12-21    138  |  91<L>  |  62.4<H>  ----------------------------<  271<H>  4.6   |  33.0<H>  |  1.57<H>    Ca    9.5      21 Dec 2023 05:59  Mg     2.3     12-21      PT/INR/PTT ( 16 Dec 2023 10:18 )                       :                       :      14.6         :       31.7                  .        .                   .              .           .       1.33        .                                       Lipid Profile: Date: 12-17 @ 08:20  Total cholesterol 140; Direct LDL: --; HDL: 70; Triglycerides:133    HgA1c: 8.3%   TSH: Thyroid Stimulating Hormone, Serum: 1.51 uIU/mL  Thyroid Stimulating Hormone, Serum: 3.02 uIU/mL      TELEMETRY: NSR with PVCs   ECG: NSR     DIAGNOSTIC TESTING:  [ x] Echocardiogram: < from: TTE W or WO Ultrasound Enhancing Agent (12.18.23 @ 13:36) >   1. Technically difficult image quality.   2. Normal biventricular systolic function.   3. Compared to the transthoracic echocardiogram performed on 8/31/2023.    < end of copied text >    [ ]  Catheterization:  [ ] Stress Test:    OTHER: 	                                                                [facility]                                                         CARDIOLOGY AT [facility]                                                                  [address] [address]                                                         Telephone: [phone]. Fax:[phone]                                                                             PROGRESS NOTE    Reason for follow up: RSV +, HFpEF   Update: Pt is on HFNC 40L/40%. Patient is currently NPO since midnight for LHC/RHC.       Review of symptoms:   Cardiac:  No chest pain. No dyspnea. No palpitations.  Respiratory: no cough. No dyspnea  Gastrointestinal: No diarrhea. No abdominal pain. No bleeding.   Neuro: No focal neuro complaints.    Vitals:  T(C): 36.7 (12-21-23 @ 04:15), Max: 37.2 (12-20-23 @ 20:00)  HR: 76 (12-21-23 @ 06:00) (74 - 87)  BP: 104/62 (12-21-23 @ 06:00) (104/62 - 154/75)  RR: 18 (12-21-23 @ 06:00) (18 - 20)  SpO2: 98% (12-21-23 @ 06:00) (92% - 99%)  Wt(kg): --  I&O's Summary    20 Dec 2023 07:01  -  21 Dec 2023 07:00  --------------------------------------------------------  IN: 1140 mL / OUT: 3075 mL / NET: -1935 mL      Weight (kg): 177.4 (12-16 @ 10:02)    PHYSICAL EXAM:  Appearance: Comfortable. No acute distress. super morbidly obese.   HEENT:  Atraumatic. Normocephalic.  Normal oral mucosa  Neurologic: A & O x 3, no gross focal deficits.  Cardiovascular: RRR S1 S2, No murmur, no rubs/gallops. No JVD  Respiratory: B/L wheezing. unlabored. HFNC.   Gastrointestinal:  Soft, Non-tender, + BS  Lower Extremities: 2+ Peripheral Pulses, No clubbing, cyanosis. + 2 B/L LE edema.   Psychiatry: Patient is calm. No agitation.   Skin: warm and dry.    CURRENT CARDIAC MEDICATIONS:  hydrALAZINE Injectable 10 milliGRAM(s) IV Push every 4 hours PRN      CURRENT OTHER MEDICATIONS:  albuterol    90 MICROgram(s) HFA Inhaler 2 Puff(s) Inhalation every 2 hours PRN Shortness of Breath and/or Wheezing  albuterol/ipratropium for Nebulization 3 milliLiter(s) Nebulizer every 6 hours  budesonide  80 MICROgram(s)/formoterol 4.5 MICROgram(s) Inhaler 2 Puff(s) Inhalation two times a day  tiotropium 2.5 MICROgram(s) Inhaler 2 Puff(s) Inhalation daily  piperacillin/tazobactam IVPB.- 3.375 Gram(s) IV Intermittent once  piperacillin/tazobactam IVPB.. 3.375 Gram(s) IV Intermittent every 8 hours  acetaminophen     Tablet .. 650 milliGRAM(s) Oral every 6 hours PRN Temp greater or equal to 38C (100.4F), Mild Pain (1 - 3)  ALPRAZolam 0.25 milliGRAM(s) Oral every 8 hours PRN for anxiety/Sleep  diphenhydrAMINE 25 milliGRAM(s) Oral every 6 hours PRN Rash and/or Itching  HYDROmorphone  Injectable 0.5 milliGRAM(s) IV Push every 6 hours PRN Severe Pain (7 - 10)  HYDROmorphone  Injectable 1 milliGRAM(s) IV Push every 4 hours PRN breakthrough  pain  melatonin 3 milliGRAM(s) Oral at bedtime PRN Insomnia  ondansetron Injectable 4 milliGRAM(s) IV Push every 8 hours PRN Nausea and/or Vomiting  oxyCODONE    IR 5 milliGRAM(s) Oral every 6 hours PRN Moderate Pain (4 - 6)  oxyCODONE  ER Tablet 60 milliGRAM(s) Oral every 12 hours  lactulose Syrup 20 Gram(s) Oral daily  polyethylene glycol 3350 17 Gram(s) Oral daily  senna 2 Tablet(s) Oral at bedtime  atorvastatin 80 milliGRAM(s) Oral at bedtime  dextrose 50% Injectable 25 Gram(s) IV Push once, Stop order after: 1 Doses  dextrose 50% Injectable 12.5 Gram(s) IV Push once, Stop order after: 1 Doses  dextrose 50% Injectable 25 Gram(s) IV Push once, Stop order after: 1 Doses  dextrose Oral Gel 15 Gram(s) Oral once, Stop order after: 1 Doses PRN Blood Glucose LESS THAN 70 milliGRAM(s)/deciliter  glucagon  Injectable 1 milliGRAM(s) IntraMuscular once, Stop order after: 1 Doses  insulin glargine Injectable (LANTUS) 80 Unit(s) SubCutaneous at bedtime  insulin lispro (ADMELOG) corrective regimen sliding scale   SubCutaneous every 6 hours  insulin lispro Injectable (ADMELOG) 30 Unit(s) SubCutaneous three times a day before meals  methylPREDNISolone sodium succinate Injectable 40 milliGRAM(s) IV Push every 8 hours  dextrose 5%. 1000 milliLiter(s) (50 mL/Hr) IV Continuous <Continuous>  dextrose 5%. 1000 milliLiter(s) (100 mL/Hr) IV Continuous <Continuous>  heparin   Injectable 5000 Unit(s) SubCutaneous every 8 hours      LABS:	 	                            14.0   11.67 )-----------( 233      ( 20 Dec 2023 07:40 )             42.3     12-21    138  |  91<L>  |  62.4<H>  ----------------------------<  271<H>  4.6   |  33.0<H>  |  1.57<H>    Ca    9.5      21 Dec 2023 05:59  Mg     2.3     12-21      PT/INR/PTT ( 16 Dec 2023 10:18 )                       :                       :      14.6         :       31.7                  .        .                   .              .           .       1.33        .                                       Lipid Profile: Date: 12-17 @ 08:20  Total cholesterol 140; Direct LDL: --; HDL: 70; Triglycerides:133    HgA1c: 8.3%   TSH: Thyroid Stimulating Hormone, Serum: 1.51 uIU/mL  Thyroid Stimulating Hormone, Serum: 3.02 uIU/mL      TELEMETRY: NSR with PVCs   ECG: NSR     DIAGNOSTIC TESTING:  [ x] Echocardiogram: < from: TTE W or WO Ultrasound Enhancing Agent (12.18.23 @ 13:36) >   1. Technically difficult image quality.   2. Normal biventricular systolic function.   3. Compared to the transthoracic echocardiogram performed on 8/31/2023.    < end of copied text >    [ ]  Catheterization:  [ ] Stress Test:    OTHER: 	                                                                [facility]                                                         CARDIOLOGY AT [facility]                                                                  [address], [place] [address]                                                         Telephone: [phone]. Fax:[phone]                                                                             PROGRESS NOTE    Reason for follow up: RSV +, HFpEF   Update: Pt is on HFNC 40L/40%. Patient is currently NPO since midnight for LHC/RHC but she is not mentally ready and wants a few more days to wait for the procedure      Review of symptoms:   Cardiac:  No chest pain. No dyspnea. No palpitations.  Respiratory: no cough. No dyspnea  Gastrointestinal: No diarrhea. No abdominal pain. No bleeding.   Neuro: No focal neuro complaints.    Vitals:  T(C): 36.7 (12-21-23 @ 04:15), Max: 37.2 (12-20-23 @ 20:00)  HR: 76 (12-21-23 @ 06:00) (74 - 87)  BP: 104/62 (12-21-23 @ 06:00) (104/62 - 154/75)  RR: 18 (12-21-23 @ 06:00) (18 - 20)  SpO2: 98% (12-21-23 @ 06:00) (92% - 99%)  Wt(kg): --  I&O's Summary    20 Dec 2023 07:01  -  21 Dec 2023 07:00  --------------------------------------------------------  IN: 1140 mL / OUT: 3075 mL / NET: -1935 mL      Weight (kg): 177.4 (12-16 @ 10:02)    PHYSICAL EXAM:  Appearance: Comfortable. No acute distress. super morbidly obese.   HEENT:  Atraumatic. Normocephalic.  Normal oral mucosa  Neurologic: A & O x 3, no gross focal deficits.  Cardiovascular: RRR S1 S2, No murmur, no rubs/gallops. No JVD  Respiratory: B/L wheezing. unlabored. HFNC.   Gastrointestinal:  Soft, Non-tender, + BS  Lower Extremities: 2+ Peripheral Pulses, No clubbing, cyanosis. + 2 B/L LE edema.   Psychiatry: Patient is calm. No agitation.   Skin: warm and dry.    CURRENT CARDIAC MEDICATIONS:  hydrALAZINE Injectable 10 milliGRAM(s) IV Push every 4 hours PRN      CURRENT OTHER MEDICATIONS:  albuterol    90 MICROgram(s) HFA Inhaler 2 Puff(s) Inhalation every 2 hours PRN Shortness of Breath and/or Wheezing  albuterol/ipratropium for Nebulization 3 milliLiter(s) Nebulizer every 6 hours  budesonide  80 MICROgram(s)/formoterol 4.5 MICROgram(s) Inhaler 2 Puff(s) Inhalation two times a day  tiotropium 2.5 MICROgram(s) Inhaler 2 Puff(s) Inhalation daily  piperacillin/tazobactam IVPB.- 3.375 Gram(s) IV Intermittent once  piperacillin/tazobactam IVPB.. 3.375 Gram(s) IV Intermittent every 8 hours  acetaminophen     Tablet .. 650 milliGRAM(s) Oral every 6 hours PRN Temp greater or equal to 38C (100.4F), Mild Pain (1 - 3)  ALPRAZolam 0.25 milliGRAM(s) Oral every 8 hours PRN for anxiety/Sleep  diphenhydrAMINE 25 milliGRAM(s) Oral every 6 hours PRN Rash and/or Itching  HYDROmorphone  Injectable 0.5 milliGRAM(s) IV Push every 6 hours PRN Severe Pain (7 - 10)  HYDROmorphone  Injectable 1 milliGRAM(s) IV Push every 4 hours PRN breakthrough  pain  melatonin 3 milliGRAM(s) Oral at bedtime PRN Insomnia  ondansetron Injectable 4 milliGRAM(s) IV Push every 8 hours PRN Nausea and/or Vomiting  oxyCODONE    IR 5 milliGRAM(s) Oral every 6 hours PRN Moderate Pain (4 - 6)  oxyCODONE  ER Tablet 60 milliGRAM(s) Oral every 12 hours  lactulose Syrup 20 Gram(s) Oral daily  polyethylene glycol 3350 17 Gram(s) Oral daily  senna 2 Tablet(s) Oral at bedtime  atorvastatin 80 milliGRAM(s) Oral at bedtime  dextrose 50% Injectable 25 Gram(s) IV Push once, Stop order after: 1 Doses  dextrose 50% Injectable 12.5 Gram(s) IV Push once, Stop order after: 1 Doses  dextrose 50% Injectable 25 Gram(s) IV Push once, Stop order after: 1 Doses  dextrose Oral Gel 15 Gram(s) Oral once, Stop order after: 1 Doses PRN Blood Glucose LESS THAN 70 milliGRAM(s)/deciliter  glucagon  Injectable 1 milliGRAM(s) IntraMuscular once, Stop order after: 1 Doses  insulin glargine Injectable (LANTUS) 80 Unit(s) SubCutaneous at bedtime  insulin lispro (ADMELOG) corrective regimen sliding scale   SubCutaneous every 6 hours  insulin lispro Injectable (ADMELOG) 30 Unit(s) SubCutaneous three times a day before meals  methylPREDNISolone sodium succinate Injectable 40 milliGRAM(s) IV Push every 8 hours  dextrose 5%. 1000 milliLiter(s) (50 mL/Hr) IV Continuous <Continuous>  dextrose 5%. 1000 milliLiter(s) (100 mL/Hr) IV Continuous <Continuous>  heparin   Injectable 5000 Unit(s) SubCutaneous every 8 hours      LABS:	 	                            14.0   11.67 )-----------( 233      ( 20 Dec 2023 07:40 )             42.3     12-21    138  |  91<L>  |  62.4<H>  ----------------------------<  271<H>  4.6   |  33.0<H>  |  1.57<H>    Ca    9.5      21 Dec 2023 05:59  Mg     2.3     12-21      PT/INR/PTT ( 16 Dec 2023 10:18 )                       :                       :      14.6         :       31.7                  .        .                   .              .           .       1.33        .                                       Lipid Profile: Date: 12-17 @ 08:20  Total cholesterol 140; Direct LDL: --; HDL: 70; Triglycerides:133    HgA1c: 8.3%   TSH: Thyroid Stimulating Hormone, Serum: 1.51 uIU/mL  Thyroid Stimulating Hormone, Serum: 3.02 uIU/mL      TELEMETRY: NSR with PVCs   ECG: NSR     DIAGNOSTIC TESTING:  [ x] Echocardiogram: < from: TTE W or WO Ultrasound Enhancing Agent (12.18.23 @ 13:36) >   1. Technically difficult image quality.   2. Normal biventricular systolic function.   3. Compared to the transthoracic echocardiogram performed on 8/31/2023.    < end of copied text >    [ ]  Catheterization:  [ ] Stress Test:    OTHER: 	                                                                [facility]                                                         CARDIOLOGY AT [facility]                                                                  [address], [place] [address]                                                         Telephone: [phone]. Fax:[phone]                                                                             PROGRESS NOTE    Reason for follow up: RSV +, HFpEF   Update: Pt is on HFNC 40L/40%. Patient is currently NPO since midnight for LHC/RHC but she is not mentally ready and wants a few more days to wait for the procedure      Review of symptoms:   Cardiac:  No chest pain. No dyspnea. No palpitations.  Respiratory: no cough. No dyspnea  Gastrointestinal: No diarrhea. No abdominal pain. No bleeding.   Neuro: No focal neuro complaints.    Vitals:  T(C): 36.7 (12-21-23 @ 04:15), Max: 37.2 (12-20-23 @ 20:00)  HR: 76 (12-21-23 @ 06:00) (74 - 87)  BP: 104/62 (12-21-23 @ 06:00) (104/62 - 154/75)  RR: 18 (12-21-23 @ 06:00) (18 - 20)  SpO2: 98% (12-21-23 @ 06:00) (92% - 99%)  Wt(kg): --  I&O's Summary    20 Dec 2023 07:01  -  21 Dec 2023 07:00  --------------------------------------------------------  IN: 1140 mL / OUT: 3075 mL / NET: -1935 mL      Weight (kg): 177.4 (12-16 @ 10:02)    PHYSICAL EXAM:  Appearance: Comfortable. No acute distress. super morbidly obese.   HEENT:  Atraumatic. Normocephalic.  Normal oral mucosa  Neurologic: A & O x 3, no gross focal deficits.  Cardiovascular: RRR S1 S2, No murmur, no rubs/gallops. No JVD  Respiratory: B/L wheezing. unlabored. HFNC.   Gastrointestinal:  Soft, Non-tender, + BS  Lower Extremities: 2+ Peripheral Pulses, No clubbing, cyanosis. + 2 B/L LE edema.   Psychiatry: Patient is calm. No agitation.   Skin: warm and dry.    CURRENT CARDIAC MEDICATIONS:  hydrALAZINE Injectable 10 milliGRAM(s) IV Push every 4 hours PRN      CURRENT OTHER MEDICATIONS:  albuterol    90 MICROgram(s) HFA Inhaler 2 Puff(s) Inhalation every 2 hours PRN Shortness of Breath and/or Wheezing  albuterol/ipratropium for Nebulization 3 milliLiter(s) Nebulizer every 6 hours  budesonide  80 MICROgram(s)/formoterol 4.5 MICROgram(s) Inhaler 2 Puff(s) Inhalation two times a day  tiotropium 2.5 MICROgram(s) Inhaler 2 Puff(s) Inhalation daily  piperacillin/tazobactam IVPB.- 3.375 Gram(s) IV Intermittent once  piperacillin/tazobactam IVPB.. 3.375 Gram(s) IV Intermittent every 8 hours  acetaminophen     Tablet .. 650 milliGRAM(s) Oral every 6 hours PRN Temp greater or equal to 38C (100.4F), Mild Pain (1 - 3)  ALPRAZolam 0.25 milliGRAM(s) Oral every 8 hours PRN for anxiety/Sleep  diphenhydrAMINE 25 milliGRAM(s) Oral every 6 hours PRN Rash and/or Itching  HYDROmorphone  Injectable 0.5 milliGRAM(s) IV Push every 6 hours PRN Severe Pain (7 - 10)  HYDROmorphone  Injectable 1 milliGRAM(s) IV Push every 4 hours PRN breakthrough  pain  melatonin 3 milliGRAM(s) Oral at bedtime PRN Insomnia  ondansetron Injectable 4 milliGRAM(s) IV Push every 8 hours PRN Nausea and/or Vomiting  oxyCODONE    IR 5 milliGRAM(s) Oral every 6 hours PRN Moderate Pain (4 - 6)  oxyCODONE  ER Tablet 60 milliGRAM(s) Oral every 12 hours  lactulose Syrup 20 Gram(s) Oral daily  polyethylene glycol 3350 17 Gram(s) Oral daily  senna 2 Tablet(s) Oral at bedtime  atorvastatin 80 milliGRAM(s) Oral at bedtime  dextrose 50% Injectable 25 Gram(s) IV Push once, Stop order after: 1 Doses  dextrose 50% Injectable 12.5 Gram(s) IV Push once, Stop order after: 1 Doses  dextrose 50% Injectable 25 Gram(s) IV Push once, Stop order after: 1 Doses  dextrose Oral Gel 15 Gram(s) Oral once, Stop order after: 1 Doses PRN Blood Glucose LESS THAN 70 milliGRAM(s)/deciliter  glucagon  Injectable 1 milliGRAM(s) IntraMuscular once, Stop order after: 1 Doses  insulin glargine Injectable (LANTUS) 80 Unit(s) SubCutaneous at bedtime  insulin lispro (ADMELOG) corrective regimen sliding scale   SubCutaneous every 6 hours  insulin lispro Injectable (ADMELOG) 30 Unit(s) SubCutaneous three times a day before meals  methylPREDNISolone sodium succinate Injectable 40 milliGRAM(s) IV Push every 8 hours  dextrose 5%. 1000 milliLiter(s) (50 mL/Hr) IV Continuous <Continuous>  dextrose 5%. 1000 milliLiter(s) (100 mL/Hr) IV Continuous <Continuous>  heparin   Injectable 5000 Unit(s) SubCutaneous every 8 hours      LABS:	 	                            14.0   11.67 )-----------( 233      ( 20 Dec 2023 07:40 )             42.3     12-21    138  |  91<L>  |  62.4<H>  ----------------------------<  271<H>  4.6   |  33.0<H>  |  1.57<H>    Ca    9.5      21 Dec 2023 05:59  Mg     2.3     12-21      PT/INR/PTT ( 16 Dec 2023 10:18 )                       :                       :      14.6         :       31.7                  .        .                   .              .           .       1.33        .                                       Lipid Profile: Date: 12-17 @ 08:20  Total cholesterol 140; Direct LDL: --; HDL: 70; Triglycerides:133    HgA1c: 8.3%   TSH: Thyroid Stimulating Hormone, Serum: 1.51 uIU/mL  Thyroid Stimulating Hormone, Serum: 3.02 uIU/mL      TELEMETRY: NSR with PVCs   ECG: NSR     DIAGNOSTIC TESTING:  [ x] Echocardiogram: < from: TTE W or WO Ultrasound Enhancing Agent (12.18.23 @ 13:36) >   1. Technically difficult image quality.   2. Normal biventricular systolic function.   3. Compared to the transthoracic echocardiogram performed on 8/31/2023.    < end of copied text >    [ ]  Catheterization:  [ ] Stress Test:    OTHER:

## 2023-12-21 NOTE — PROGRESS NOTE ADULT - SUBJECTIVE AND OBJECTIVE BOX
PULMONARY PROGRESS NOTE      [id number]    Patient is a 68y old  Female who presents with a chief complaint of SOB (21 Dec 2023 13:22)      BRIEF HOSPITAL COURSE: * RSV**    Events last 24 hours: *  -continuous to cough  now  with   rapid afib rate  130's    -     - on high flow  40%   40L/min   -off eliquis  for   cath?    REVIEW OF SYSTEMS     CONSTITUTIONAL   no fevers  no loss of appetite  no weight loss   HEENT  no sore throat   no ringing in ears  NECK   no pain   RESPIRATORY  see HPI   CARDIOVASCULAR  no chest  pain no palpitations   GASTROINTESTINAL no vomiting  no diarrhea    no   gerd   MUSCULOSKELETAL  no joint pains    no  back pain   SKIN   no rash   no itchiness   GENITOURINARY  no dysuria   HEME    no bleeding or bruising   ENDOCRINE     no   warmth   no  sweating   no  cold intolerance   NEUROLOGIC  no tremors  no seizures  no    weakness    PSYCHIATRIC   no mood disorder    no delirium   **    PAST MEDICAL & SURGICAL HISTORY:  Diabetes Mellitus Type II      HTN (Hypertension)      Endometrial Hyperplasia      Cervical Stenosis of Spine      Spinal Stenosis, Lumbar      Deep Vein Thrombosis (DVT)  Left leg, 2004, treated and resolved      Dyslipidemia      Cataract      Morbid Obesity      Vitamin D deficiency      Insomnia      CKD (chronic kidney disease)  ~ III      Congestive heart failure  ~ HFpEF      Uterine cancer      Asthma      On home oxygen therapy      Gait difficulty  ~ u ses walker      Cataract extracted with lens implant 1998  Right      C Section 1994      Cholecystectomy/appendectomy @ age 25      D&C x2 1980's      D&C 2008  hysteroscopy, endometrial hyperplasia, 2009      Cervical Spinal Stenosis surgery x2 (01/2002, 7/2002)      Tonsillectomy as a child      Endometrial biopsy 12/02/09      H/O laser iridotomy  left eye, 2016      H/O colonoscopy  1998      S/P total abdominal hysterectomy and bilateral salpingo-oophorectomy      S/P appendectomy      S/P cholecystectomy            Medications:  piperacillin/tazobactam IVPB.- 3.375 Gram(s) IV Intermittent once  piperacillin/tazobactam IVPB.. 3.375 Gram(s) IV Intermittent every 8 hours    hydrALAZINE Injectable 10 milliGRAM(s) IV Push every 4 hours PRN  metoprolol tartrate 25 milliGRAM(s) Oral two times a day  metoprolol tartrate Injectable 5 milliGRAM(s) IV Push every 6 hours PRN    albuterol    90 MICROgram(s) HFA Inhaler 2 Puff(s) Inhalation every 2 hours PRN  albuterol/ipratropium for Nebulization 3 milliLiter(s) Nebulizer every 6 hours  budesonide  80 MICROgram(s)/formoterol 4.5 MICROgram(s) Inhaler 2 Puff(s) Inhalation two times a day  tiotropium 2.5 MICROgram(s) Inhaler 2 Puff(s) Inhalation daily    acetaminophen     Tablet .. 650 milliGRAM(s) Oral every 6 hours PRN  ALPRAZolam 0.25 milliGRAM(s) Oral every 8 hours PRN  diphenhydrAMINE 25 milliGRAM(s) Oral every 6 hours PRN  HYDROmorphone  Injectable 1 milliGRAM(s) IV Push every 4 hours PRN  HYDROmorphone  Injectable 0.5 milliGRAM(s) IV Push every 6 hours PRN  melatonin 3 milliGRAM(s) Oral at bedtime PRN  ondansetron Injectable 4 milliGRAM(s) IV Push every 8 hours PRN  oxyCODONE    IR 5 milliGRAM(s) Oral every 6 hours PRN  oxyCODONE  ER Tablet 60 milliGRAM(s) Oral every 12 hours      heparin   Injectable 02421 Unit(s) IV Push every 6 hours PRN  heparin   Injectable 03617 Unit(s) IV Push once  heparin   Injectable 5000 Unit(s) IV Push every 6 hours PRN  heparin  Infusion.  Unit(s)/Hr IV Continuous <Continuous>    lactulose Syrup 20 Gram(s) Oral daily  polyethylene glycol 3350 17 Gram(s) Oral daily  senna 2 Tablet(s) Oral at bedtime      atorvastatin 80 milliGRAM(s) Oral at bedtime  dextrose 50% Injectable 25 Gram(s) IV Push once  dextrose 50% Injectable 12.5 Gram(s) IV Push once  dextrose 50% Injectable 25 Gram(s) IV Push once  dextrose Oral Gel 15 Gram(s) Oral once PRN  glucagon  Injectable 1 milliGRAM(s) IntraMuscular once  insulin glargine Injectable (LANTUS) 45 Unit(s) SubCutaneous at bedtime  insulin lispro (ADMELOG) corrective regimen sliding scale   SubCutaneous every 6 hours  insulin lispro Injectable (ADMELOG) 35 Unit(s) SubCutaneous three times a day before meals  methylPREDNISolone sodium succinate Injectable 40 milliGRAM(s) IV Push every 8 hours    dextrose 5%. 1000 milliLiter(s) IV Continuous <Continuous>  dextrose 5%. 1000 milliLiter(s) IV Continuous <Continuous>                ICU Vital Signs Last 24 Hrs  T(C): 36.7 (21 Dec 2023 04:15), Max: 37.2 (20 Dec 2023 20:00)  T(F): 98.1 (21 Dec 2023 04:15), Max: 99 (20 Dec 2023 20:00)  HR: 138 (21 Dec 2023 14:34) (72 - 138)  BP: 111/82 (21 Dec 2023 14:34) (104/62 - 157/75)  BP(mean): 86 (21 Dec 2023 14:34) (67 - 105)  ABP: --  ABP(mean): --  RR: 18 (21 Dec 2023 12:00) (17 - 18)  SpO2: 93% (21 Dec 2023 14:34) (90% - 99%)    O2 Parameters below as of 21 Dec 2023 14:34  Patient On (Oxygen Delivery Method): nasal cannula, high flow  O2 Flow (L/min): 40  O2 Concentration (%): 40        ABG - ( 20 Dec 2023 04:00 )  pH, Arterial: 7.450 pH, Blood: x     /  pCO2: 62    /  pO2: 94    / HCO3: 43    / Base Excess: 19.1  /  SaO2: 97.8                I&O's Detail    20 Dec 2023 07:01  -  21 Dec 2023 07:00  --------------------------------------------------------  IN:    Oral Fluid: 1140 mL  Total IN: 1140 mL    OUT:    Indwelling Catheter - Urethral (mL): 3075 mL  Total OUT: 3075 mL    Total NET: -1935 mL      21 Dec 2023 07:01  -  21 Dec 2023 16:00  --------------------------------------------------------  IN:  Total IN: 0 mL    OUT:    Indwelling Catheter - Urethral (mL): 400 mL  Total OUT: 400 mL    Total NET: -400 mL            LABS:                        14.0   11.67 )-----------( 233      ( 20 Dec 2023 07:40 )             42.3     12-21    138  |  91<L>  |  62.4<H>  ----------------------------<  271<H>  4.6   |  33.0<H>  |  1.57<H>    Ca    9.5      21 Dec 2023 05:59  Mg     2.3     12-21            CAPILLARY BLOOD GLUCOSE      POCT Blood Glucose.: 283 mg/dL (21 Dec 2023 11:54)      Urinalysis Basic - ( 21 Dec 2023 05:59 )    Color: x / Appearance: x / SG: x / pH: x  Gluc: 271 mg/dL / Ketone: x  / Bili: x / Urobili: x   Blood: x / Protein: x / Nitrite: x   Leuk Esterase: x / RBC: x / WBC x   Sq Epi: x / Non Sq Epi: x / Bacteria: x      CULTURES:  Culture Results:   No growth at 4 days (12-16 @ 15:20)  Culture Results:   No growth at 4 days (12-16 @ 15:10)  Culture Results:   No growth (12-16 @ 12:30)  Rapid RVP Result: Detected (12-16 @ 10:18)      Physical Examination:    General:  obese f   cough  noted   speaking full sentences     HEENT: Pupils equal, reactive to light.  Symmetric.    PULM:  bronchial cough no w  NECK: Supple, no lymphadenopathy, trachea midline    CVS: Regular rate and rhythm, no murmurs, rubs, or gallops    ABD: Soft, nondistended, nontender, normoactive bowel sounds, no masses    EXT:  edema    SKIN: Warm and well perfused, no rashes noted.    NEURO: Alert, oriented, interactive, nonfocal    DEVICES:     RADIOLOGY: *IMPRESSION:  Significantly limited exam.    No evidence of deep venous thrombosis in either lower extremity where   visualized    ---End of Report ---            MARIAN QUINONEZ MD; Attending Radiologist  This document has been electronically signed. Dec 17 2023 10:34PM  *PROCEDURE DATE:  12/16/2023          INTERPRETATION:  INDICATION: Shortness of breath    TECHNIQUE: A volumetric CT acquisition of the chest was obtained from the   thoracic inlet to theupper abdomen without the use of intravenous   contrast. Coronal and sagittal reconstructed images are provided.    COMPARISON: Chest CT 7/28/2020    FINDINGS:    Lungs/Airways/Pleura: Imaging was acquired in expiration. Mosaic lung   parenchyma. No pleural effusion. There is diffuse peribronchovascular   consolidation throughout the right lung, with associated obstruction of   several distal segmental and subsegmental bronchi.    Mediastinum/Lymph nodes: No thoracic adenopathy.    Heart and Vessels: Enlarged pulmonary artery which can be seen with   pulmonary hypertension. The heart appears enlarged. No pericardial   effusion. Qualitatively mild coronary artery calcification. Mitral   annular calcification.    Upper Abdomen: Limited by patient body habitus. Cholecystectomy. Stable   3.4 cm right adrenal adenoma.    Osseous structures and Soft Tissues: No aggressive bone lesions.    IMPRESSION:  Right lung peribronchovascular consolidation, concerning for pneumonia.   Follow-up chest CT in 8-12 weeks to resolution.    Cardiomegaly. Enlarged pulmonary artery which can be seen with pulmonary   hypertension.    --- End of Report ---            BRENDA BROUSSARD M.D., Attending Radiologist  This document has been electronically signed. Dec 16 2023  2:14PM  *    CRITICAL CARE TIME SPENT: ***     PULMONARY PROGRESS NOTE      [id number]    Patient is a 68y old  Female who presents with a chief complaint of SOB (21 Dec 2023 13:22)      BRIEF HOSPITAL COURSE: * RSV**    Events last 24 hours: *  -continuous to cough  now  with   rapid afib rate  130's    -     - on high flow  40%   40L/min   -off eliquis  for   cath?    REVIEW OF SYSTEMS     CONSTITUTIONAL   no fevers  no loss of appetite  no weight loss   HEENT  no sore throat   no ringing in ears  NECK   no pain   RESPIRATORY  see HPI   CARDIOVASCULAR  no chest  pain no palpitations   GASTROINTESTINAL no vomiting  no diarrhea    no   gerd   MUSCULOSKELETAL  no joint pains    no  back pain   SKIN   no rash   no itchiness   GENITOURINARY  no dysuria   HEME    no bleeding or bruising   ENDOCRINE     no   warmth   no  sweating   no  cold intolerance   NEUROLOGIC  no tremors  no seizures  no    weakness    PSYCHIATRIC   no mood disorder    no delirium   **    PAST MEDICAL & SURGICAL HISTORY:  Diabetes Mellitus Type II      HTN (Hypertension)      Endometrial Hyperplasia      Cervical Stenosis of Spine      Spinal Stenosis, Lumbar      Deep Vein Thrombosis (DVT)  Left leg, 2004, treated and resolved      Dyslipidemia      Cataract      Morbid Obesity      Vitamin D deficiency      Insomnia      CKD (chronic kidney disease)  ~ III      Congestive heart failure  ~ HFpEF      Uterine cancer      Asthma      On home oxygen therapy      Gait difficulty  ~ u ses walker      Cataract extracted with lens implant 1998  Right      C Section 1994      Cholecystectomy/appendectomy @ age 25      D&C x2 1980's      D&C 2008  hysteroscopy, endometrial hyperplasia, 2009      Cervical Spinal Stenosis surgery x2 (01/2002, 7/2002)      Tonsillectomy as a child      Endometrial biopsy 12/02/09      H/O laser iridotomy  left eye, 2016      H/O colonoscopy  1998      S/P total abdominal hysterectomy and bilateral salpingo-oophorectomy      S/P appendectomy      S/P cholecystectomy            Medications:  piperacillin/tazobactam IVPB.- 3.375 Gram(s) IV Intermittent once  piperacillin/tazobactam IVPB.. 3.375 Gram(s) IV Intermittent every 8 hours    hydrALAZINE Injectable 10 milliGRAM(s) IV Push every 4 hours PRN  metoprolol tartrate 25 milliGRAM(s) Oral two times a day  metoprolol tartrate Injectable 5 milliGRAM(s) IV Push every 6 hours PRN    albuterol    90 MICROgram(s) HFA Inhaler 2 Puff(s) Inhalation every 2 hours PRN  albuterol/ipratropium for Nebulization 3 milliLiter(s) Nebulizer every 6 hours  budesonide  80 MICROgram(s)/formoterol 4.5 MICROgram(s) Inhaler 2 Puff(s) Inhalation two times a day  tiotropium 2.5 MICROgram(s) Inhaler 2 Puff(s) Inhalation daily    acetaminophen     Tablet .. 650 milliGRAM(s) Oral every 6 hours PRN  ALPRAZolam 0.25 milliGRAM(s) Oral every 8 hours PRN  diphenhydrAMINE 25 milliGRAM(s) Oral every 6 hours PRN  HYDROmorphone  Injectable 1 milliGRAM(s) IV Push every 4 hours PRN  HYDROmorphone  Injectable 0.5 milliGRAM(s) IV Push every 6 hours PRN  melatonin 3 milliGRAM(s) Oral at bedtime PRN  ondansetron Injectable 4 milliGRAM(s) IV Push every 8 hours PRN  oxyCODONE    IR 5 milliGRAM(s) Oral every 6 hours PRN  oxyCODONE  ER Tablet 60 milliGRAM(s) Oral every 12 hours      heparin   Injectable 70747 Unit(s) IV Push every 6 hours PRN  heparin   Injectable 04011 Unit(s) IV Push once  heparin   Injectable 5000 Unit(s) IV Push every 6 hours PRN  heparin  Infusion.  Unit(s)/Hr IV Continuous <Continuous>    lactulose Syrup 20 Gram(s) Oral daily  polyethylene glycol 3350 17 Gram(s) Oral daily  senna 2 Tablet(s) Oral at bedtime      atorvastatin 80 milliGRAM(s) Oral at bedtime  dextrose 50% Injectable 25 Gram(s) IV Push once  dextrose 50% Injectable 12.5 Gram(s) IV Push once  dextrose 50% Injectable 25 Gram(s) IV Push once  dextrose Oral Gel 15 Gram(s) Oral once PRN  glucagon  Injectable 1 milliGRAM(s) IntraMuscular once  insulin glargine Injectable (LANTUS) 45 Unit(s) SubCutaneous at bedtime  insulin lispro (ADMELOG) corrective regimen sliding scale   SubCutaneous every 6 hours  insulin lispro Injectable (ADMELOG) 35 Unit(s) SubCutaneous three times a day before meals  methylPREDNISolone sodium succinate Injectable 40 milliGRAM(s) IV Push every 8 hours    dextrose 5%. 1000 milliLiter(s) IV Continuous <Continuous>  dextrose 5%. 1000 milliLiter(s) IV Continuous <Continuous>                ICU Vital Signs Last 24 Hrs  T(C): 36.7 (21 Dec 2023 04:15), Max: 37.2 (20 Dec 2023 20:00)  T(F): 98.1 (21 Dec 2023 04:15), Max: 99 (20 Dec 2023 20:00)  HR: 138 (21 Dec 2023 14:34) (72 - 138)  BP: 111/82 (21 Dec 2023 14:34) (104/62 - 157/75)  BP(mean): 86 (21 Dec 2023 14:34) (67 - 105)  ABP: --  ABP(mean): --  RR: 18 (21 Dec 2023 12:00) (17 - 18)  SpO2: 93% (21 Dec 2023 14:34) (90% - 99%)    O2 Parameters below as of 21 Dec 2023 14:34  Patient On (Oxygen Delivery Method): nasal cannula, high flow  O2 Flow (L/min): 40  O2 Concentration (%): 40        ABG - ( 20 Dec 2023 04:00 )  pH, Arterial: 7.450 pH, Blood: x     /  pCO2: 62    /  pO2: 94    / HCO3: 43    / Base Excess: 19.1  /  SaO2: 97.8                I&O's Detail    20 Dec 2023 07:01  -  21 Dec 2023 07:00  --------------------------------------------------------  IN:    Oral Fluid: 1140 mL  Total IN: 1140 mL    OUT:    Indwelling Catheter - Urethral (mL): 3075 mL  Total OUT: 3075 mL    Total NET: -1935 mL      21 Dec 2023 07:01  -  21 Dec 2023 16:00  --------------------------------------------------------  IN:  Total IN: 0 mL    OUT:    Indwelling Catheter - Urethral (mL): 400 mL  Total OUT: 400 mL    Total NET: -400 mL            LABS:                        14.0   11.67 )-----------( 233      ( 20 Dec 2023 07:40 )             42.3     12-21    138  |  91<L>  |  62.4<H>  ----------------------------<  271<H>  4.6   |  33.0<H>  |  1.57<H>    Ca    9.5      21 Dec 2023 05:59  Mg     2.3     12-21            CAPILLARY BLOOD GLUCOSE      POCT Blood Glucose.: 283 mg/dL (21 Dec 2023 11:54)      Urinalysis Basic - ( 21 Dec 2023 05:59 )    Color: x / Appearance: x / SG: x / pH: x  Gluc: 271 mg/dL / Ketone: x  / Bili: x / Urobili: x   Blood: x / Protein: x / Nitrite: x   Leuk Esterase: x / RBC: x / WBC x   Sq Epi: x / Non Sq Epi: x / Bacteria: x      CULTURES:  Culture Results:   No growth at 4 days (12-16 @ 15:20)  Culture Results:   No growth at 4 days (12-16 @ 15:10)  Culture Results:   No growth (12-16 @ 12:30)  Rapid RVP Result: Detected (12-16 @ 10:18)      Physical Examination:    General:  obese f   cough  noted   speaking full sentences     HEENT: Pupils equal, reactive to light.  Symmetric.    PULM:  bronchial cough no w  NECK: Supple, no lymphadenopathy, trachea midline    CVS: Regular rate and rhythm, no murmurs, rubs, or gallops    ABD: Soft, nondistended, nontender, normoactive bowel sounds, no masses    EXT:  edema    SKIN: Warm and well perfused, no rashes noted.    NEURO: Alert, oriented, interactive, nonfocal    DEVICES:     RADIOLOGY: *IMPRESSION:  Significantly limited exam.    No evidence of deep venous thrombosis in either lower extremity where   visualized    ---End of Report ---            MARIAN QUINONEZ MD; Attending Radiologist  This document has been electronically signed. Dec 17 2023 10:34PM  *PROCEDURE DATE:  12/16/2023          INTERPRETATION:  INDICATION: Shortness of breath    TECHNIQUE: A volumetric CT acquisition of the chest was obtained from the   thoracic inlet to theupper abdomen without the use of intravenous   contrast. Coronal and sagittal reconstructed images are provided.    COMPARISON: Chest CT 7/28/2020    FINDINGS:    Lungs/Airways/Pleura: Imaging was acquired in expiration. Mosaic lung   parenchyma. No pleural effusion. There is diffuse peribronchovascular   consolidation throughout the right lung, with associated obstruction of   several distal segmental and subsegmental bronchi.    Mediastinum/Lymph nodes: No thoracic adenopathy.    Heart and Vessels: Enlarged pulmonary artery which can be seen with   pulmonary hypertension. The heart appears enlarged. No pericardial   effusion. Qualitatively mild coronary artery calcification. Mitral   annular calcification.    Upper Abdomen: Limited by patient body habitus. Cholecystectomy. Stable   3.4 cm right adrenal adenoma.    Osseous structures and Soft Tissues: No aggressive bone lesions.    IMPRESSION:  Right lung peribronchovascular consolidation, concerning for pneumonia.   Follow-up chest CT in 8-12 weeks to resolution.    Cardiomegaly. Enlarged pulmonary artery which can be seen with pulmonary   hypertension.    --- End of Report ---            BERNDA BROUSSARD M.D., Attending Radiologist  This document has been electronically signed. Dec 16 2023  2:14PM  *    CRITICAL CARE TIME SPENT: ***

## 2023-12-21 NOTE — PROGRESS NOTE ADULT - ASSESSMENT
67 yo female with HTN, HLD, DM2, HFpEF, CKD III, DVT, Uterine CA, COPD on home o2 who presented with complaints of shortness of breath. Patient reports that she has been sick all week and was recently started on Vantin and steroids while at the nursing home. Patient reports that her dyspnea just worsened and she told the nursing home to send her in for an evaluation. While in the ED, patient was placed on BIPAP and was given IV lasix with some improvement. RVP then results as RSV +. Patient had a CT scan which also showed pneumonia and pulmonary HTN. Admission to medicine was requested for further management.    #Acute on chronic respiratory failure/ likely multifactorial   #Acute HFpEF exacerbation  # likely with Superimposed bacterial Pneumonia in setting of RSV  #Pulmonary hypertension  ct noted with rt lung pna   BNP elevated  +RSV  c/w Liliyasymadeline dang dc as legionella negative  IV steroids  negative Blood cx  c/w IV Lasix QD  Cardiology following, plan for cath when respiratory status better: likely next week   BIPAP- wean as tolerated   pulm following     #Chronic pain  continue home Pain meds of Oxycodone 60 mg q12h and Oxycodone 5mg q6h PRN    #hx of suspicion for PE. DVT  Patient has empirically been treated for PE/DVT since August  No scans noted to have PE/DVT  duplex lower ext to without dvt   ddimer negative. dc eliquis per cardiology   c/w ppx HSQ    #HLD  Atorvastatin for rosuvastatin    #COPD  Steroids as above  Symbicort, Spiriva, albuterol  pulm following     #DM  #steroid induced hyperglycemia  lantus/premeal insulin per endo  endo following  a1c 8.3    DVT prophylaxis: HSQ  Dispo: pending resp status optimization, weaning off BIPAP, glucose monitoring, TTE and eventual cath

## 2023-12-22 DIAGNOSIS — I48.91 UNSPECIFIED ATRIAL FIBRILLATION: ICD-10-CM

## 2023-12-22 LAB
ANION GAP SERPL CALC-SCNC: 10 MMOL/L — SIGNIFICANT CHANGE UP (ref 5–17)
ANION GAP SERPL CALC-SCNC: 10 MMOL/L — SIGNIFICANT CHANGE UP (ref 5–17)
ANION GAP SERPL CALC-SCNC: 13 MMOL/L — SIGNIFICANT CHANGE UP (ref 5–17)
ANION GAP SERPL CALC-SCNC: 13 MMOL/L — SIGNIFICANT CHANGE UP (ref 5–17)
APTT BLD: >200 SEC — CRITICAL HIGH (ref 24.5–35.6)
BASE EXCESS BLDA CALC-SCNC: 16.3 MMOL/L — HIGH (ref -2–3)
BASE EXCESS BLDA CALC-SCNC: 16.3 MMOL/L — HIGH (ref -2–3)
BLOOD GAS COMMENTS ARTERIAL: SIGNIFICANT CHANGE UP
BLOOD GAS COMMENTS ARTERIAL: SIGNIFICANT CHANGE UP
BUN SERPL-MCNC: 58.7 MG/DL — HIGH (ref 8–20)
BUN SERPL-MCNC: 58.7 MG/DL — HIGH (ref 8–20)
BUN SERPL-MCNC: 58.9 MG/DL — HIGH (ref 8–20)
BUN SERPL-MCNC: 58.9 MG/DL — HIGH (ref 8–20)
CALCIUM SERPL-MCNC: 9 MG/DL — SIGNIFICANT CHANGE UP (ref 8.4–10.5)
CALCIUM SERPL-MCNC: 9 MG/DL — SIGNIFICANT CHANGE UP (ref 8.4–10.5)
CALCIUM SERPL-MCNC: 9.1 MG/DL — SIGNIFICANT CHANGE UP (ref 8.4–10.5)
CALCIUM SERPL-MCNC: 9.1 MG/DL — SIGNIFICANT CHANGE UP (ref 8.4–10.5)
CHLORIDE SERPL-SCNC: 92 MMOL/L — LOW (ref 96–108)
CHLORIDE SERPL-SCNC: 92 MMOL/L — LOW (ref 96–108)
CHLORIDE SERPL-SCNC: 93 MMOL/L — LOW (ref 96–108)
CHLORIDE SERPL-SCNC: 93 MMOL/L — LOW (ref 96–108)
CO2 SERPL-SCNC: 34 MMOL/L — HIGH (ref 22–29)
CO2 SERPL-SCNC: 34 MMOL/L — HIGH (ref 22–29)
CO2 SERPL-SCNC: 35 MMOL/L — HIGH (ref 22–29)
CO2 SERPL-SCNC: 35 MMOL/L — HIGH (ref 22–29)
CREAT SERPL-MCNC: 1.65 MG/DL — HIGH (ref 0.5–1.3)
CREAT SERPL-MCNC: 1.65 MG/DL — HIGH (ref 0.5–1.3)
CREAT SERPL-MCNC: 1.69 MG/DL — HIGH (ref 0.5–1.3)
CREAT SERPL-MCNC: 1.69 MG/DL — HIGH (ref 0.5–1.3)
EGFR: 32 ML/MIN/1.73M2 — LOW
EGFR: 32 ML/MIN/1.73M2 — LOW
EGFR: 33 ML/MIN/1.73M2 — LOW
EGFR: 33 ML/MIN/1.73M2 — LOW
GAS PNL BLDA: SIGNIFICANT CHANGE UP
GAS PNL BLDA: SIGNIFICANT CHANGE UP
GLUCOSE BLDC GLUCOMTR-MCNC: 302 MG/DL — HIGH (ref 70–99)
GLUCOSE BLDC GLUCOMTR-MCNC: 302 MG/DL — HIGH (ref 70–99)
GLUCOSE BLDC GLUCOMTR-MCNC: 317 MG/DL — HIGH (ref 70–99)
GLUCOSE BLDC GLUCOMTR-MCNC: 317 MG/DL — HIGH (ref 70–99)
GLUCOSE BLDC GLUCOMTR-MCNC: 338 MG/DL — HIGH (ref 70–99)
GLUCOSE BLDC GLUCOMTR-MCNC: 338 MG/DL — HIGH (ref 70–99)
GLUCOSE BLDC GLUCOMTR-MCNC: 372 MG/DL — HIGH (ref 70–99)
GLUCOSE BLDC GLUCOMTR-MCNC: 372 MG/DL — HIGH (ref 70–99)
GLUCOSE BLDC GLUCOMTR-MCNC: 383 MG/DL — HIGH (ref 70–99)
GLUCOSE BLDC GLUCOMTR-MCNC: 383 MG/DL — HIGH (ref 70–99)
GLUCOSE BLDC GLUCOMTR-MCNC: 403 MG/DL — HIGH (ref 70–99)
GLUCOSE BLDC GLUCOMTR-MCNC: 403 MG/DL — HIGH (ref 70–99)
GLUCOSE SERPL-MCNC: 405 MG/DL — HIGH (ref 70–99)
GLUCOSE SERPL-MCNC: 405 MG/DL — HIGH (ref 70–99)
GLUCOSE SERPL-MCNC: 409 MG/DL — HIGH (ref 70–99)
GLUCOSE SERPL-MCNC: 409 MG/DL — HIGH (ref 70–99)
HCO3 BLDA-SCNC: 40 MMOL/L — HIGH (ref 21–28)
HCO3 BLDA-SCNC: 40 MMOL/L — HIGH (ref 21–28)
HCT VFR BLD CALC: 45.5 % — HIGH (ref 34.5–45)
HCT VFR BLD CALC: 45.5 % — HIGH (ref 34.5–45)
HGB BLD-MCNC: 15.3 G/DL — SIGNIFICANT CHANGE UP (ref 11.5–15.5)
HGB BLD-MCNC: 15.3 G/DL — SIGNIFICANT CHANGE UP (ref 11.5–15.5)
HOROWITZ INDEX BLDA+IHG-RTO: 0.4 — SIGNIFICANT CHANGE UP
HOROWITZ INDEX BLDA+IHG-RTO: 0.4 — SIGNIFICANT CHANGE UP
MAGNESIUM SERPL-MCNC: 2.5 MG/DL — SIGNIFICANT CHANGE UP (ref 1.6–2.6)
MCHC RBC-ENTMCNC: 30.2 PG — SIGNIFICANT CHANGE UP (ref 27–34)
MCHC RBC-ENTMCNC: 30.2 PG — SIGNIFICANT CHANGE UP (ref 27–34)
MCHC RBC-ENTMCNC: 33.6 GM/DL — SIGNIFICANT CHANGE UP (ref 32–36)
MCHC RBC-ENTMCNC: 33.6 GM/DL — SIGNIFICANT CHANGE UP (ref 32–36)
MCV RBC AUTO: 89.9 FL — SIGNIFICANT CHANGE UP (ref 80–100)
MCV RBC AUTO: 89.9 FL — SIGNIFICANT CHANGE UP (ref 80–100)
PCO2 BLDA: 52 MMHG — HIGH (ref 32–45)
PCO2 BLDA: 52 MMHG — HIGH (ref 32–45)
PH BLDA: 7.49 — HIGH (ref 7.35–7.45)
PH BLDA: 7.49 — HIGH (ref 7.35–7.45)
PLATELET # BLD AUTO: 276 K/UL — SIGNIFICANT CHANGE UP (ref 150–400)
PLATELET # BLD AUTO: 276 K/UL — SIGNIFICANT CHANGE UP (ref 150–400)
PO2 BLDA: 94 MMHG — SIGNIFICANT CHANGE UP (ref 83–108)
PO2 BLDA: 94 MMHG — SIGNIFICANT CHANGE UP (ref 83–108)
POTASSIUM SERPL-MCNC: 4.7 MMOL/L — SIGNIFICANT CHANGE UP (ref 3.5–5.3)
POTASSIUM SERPL-SCNC: 4.7 MMOL/L — SIGNIFICANT CHANGE UP (ref 3.5–5.3)
PROCALCITONIN SERPL-MCNC: 0.11 NG/ML — HIGH (ref 0.02–0.1)
PROCALCITONIN SERPL-MCNC: 0.11 NG/ML — HIGH (ref 0.02–0.1)
RBC # BLD: 5.06 M/UL — SIGNIFICANT CHANGE UP (ref 3.8–5.2)
RBC # BLD: 5.06 M/UL — SIGNIFICANT CHANGE UP (ref 3.8–5.2)
RBC # FLD: 13.4 % — SIGNIFICANT CHANGE UP (ref 10.3–14.5)
RBC # FLD: 13.4 % — SIGNIFICANT CHANGE UP (ref 10.3–14.5)
SAO2 % BLDA: 98.3 % — HIGH (ref 94–98)
SAO2 % BLDA: 98.3 % — HIGH (ref 94–98)
SODIUM SERPL-SCNC: 138 MMOL/L — SIGNIFICANT CHANGE UP (ref 135–145)
SODIUM SERPL-SCNC: 138 MMOL/L — SIGNIFICANT CHANGE UP (ref 135–145)
SODIUM SERPL-SCNC: 139 MMOL/L — SIGNIFICANT CHANGE UP (ref 135–145)
SODIUM SERPL-SCNC: 139 MMOL/L — SIGNIFICANT CHANGE UP (ref 135–145)
T4 AB SER-ACNC: 5 UG/DL — SIGNIFICANT CHANGE UP (ref 4.5–12)
T4 AB SER-ACNC: 5 UG/DL — SIGNIFICANT CHANGE UP (ref 4.5–12)
TSH SERPL-MCNC: 0.34 UIU/ML — SIGNIFICANT CHANGE UP (ref 0.27–4.2)
TSH SERPL-MCNC: 0.34 UIU/ML — SIGNIFICANT CHANGE UP (ref 0.27–4.2)
WBC # BLD: 18.26 K/UL — HIGH (ref 3.8–10.5)
WBC # BLD: 18.26 K/UL — HIGH (ref 3.8–10.5)
WBC # FLD AUTO: 18.26 K/UL — HIGH (ref 3.8–10.5)
WBC # FLD AUTO: 18.26 K/UL — HIGH (ref 3.8–10.5)

## 2023-12-22 PROCEDURE — 99233 SBSQ HOSP IP/OBS HIGH 50: CPT

## 2023-12-22 PROCEDURE — 99234 HOSP IP/OBS SM DT SF/LOW 45: CPT

## 2023-12-22 PROCEDURE — 99232 SBSQ HOSP IP/OBS MODERATE 35: CPT

## 2023-12-22 RX ORDER — KETOROLAC TROMETHAMINE 30 MG/ML
15 SYRINGE (ML) INJECTION ONCE
Refills: 0 | Status: DISCONTINUED | OUTPATIENT
Start: 2023-12-22 | End: 2023-12-22

## 2023-12-22 RX ORDER — INSULIN LISPRO 100/ML
VIAL (ML) SUBCUTANEOUS EVERY 6 HOURS
Refills: 0 | Status: DISCONTINUED | OUTPATIENT
Start: 2023-12-22 | End: 2023-12-22

## 2023-12-22 RX ORDER — DIGOXIN 250 MCG
500 TABLET ORAL ONCE
Refills: 0 | Status: COMPLETED | OUTPATIENT
Start: 2023-12-22 | End: 2023-12-22

## 2023-12-22 RX ORDER — DILTIAZEM HCL 120 MG
5 CAPSULE, EXT RELEASE 24 HR ORAL
Qty: 125 | Refills: 0 | Status: DISCONTINUED | OUTPATIENT
Start: 2023-12-22 | End: 2023-12-23

## 2023-12-22 RX ORDER — OXYCODONE HYDROCHLORIDE 5 MG/1
5 TABLET ORAL EVERY 6 HOURS
Refills: 0 | Status: DISCONTINUED | OUTPATIENT
Start: 2023-12-22 | End: 2023-12-29

## 2023-12-22 RX ORDER — INSULIN LISPRO 100/ML
VIAL (ML) SUBCUTANEOUS
Refills: 0 | Status: DISCONTINUED | OUTPATIENT
Start: 2023-12-22 | End: 2024-01-01

## 2023-12-22 RX ORDER — METOPROLOL TARTRATE 50 MG
25 TABLET ORAL EVERY 8 HOURS
Refills: 0 | Status: DISCONTINUED | OUTPATIENT
Start: 2023-12-22 | End: 2023-12-24

## 2023-12-22 RX ORDER — IPRATROPIUM BROMIDE 0.2 MG/ML
500 SOLUTION, NON-ORAL INHALATION EVERY 6 HOURS
Refills: 0 | Status: DISCONTINUED | OUTPATIENT
Start: 2023-12-22 | End: 2024-01-08

## 2023-12-22 RX ORDER — INSULIN LISPRO 100/ML
15 VIAL (ML) SUBCUTANEOUS ONCE
Refills: 0 | Status: DISCONTINUED | OUTPATIENT
Start: 2023-12-22 | End: 2023-12-22

## 2023-12-22 RX ORDER — OXYCODONE HYDROCHLORIDE 5 MG/1
60 TABLET ORAL EVERY 12 HOURS
Refills: 0 | Status: DISCONTINUED | OUTPATIENT
Start: 2023-12-22 | End: 2023-12-29

## 2023-12-22 RX ORDER — HYDROMORPHONE HYDROCHLORIDE 2 MG/ML
1 INJECTION INTRAMUSCULAR; INTRAVENOUS; SUBCUTANEOUS EVERY 4 HOURS
Refills: 0 | Status: DISCONTINUED | OUTPATIENT
Start: 2023-12-22 | End: 2023-12-26

## 2023-12-22 RX ORDER — DILTIAZEM HCL 120 MG
5 CAPSULE, EXT RELEASE 24 HR ORAL
Qty: 125 | Refills: 0 | Status: DISCONTINUED | OUTPATIENT
Start: 2023-12-22 | End: 2023-12-22

## 2023-12-22 RX ADMIN — HEPARIN SODIUM 1600 UNIT(S)/HR: 5000 INJECTION INTRAVENOUS; SUBCUTANEOUS at 11:24

## 2023-12-22 RX ADMIN — HEPARIN SODIUM 2000 UNIT(S)/HR: 5000 INJECTION INTRAVENOUS; SUBCUTANEOUS at 02:18

## 2023-12-22 RX ADMIN — HEPARIN SODIUM 2000 UNIT(S)/HR: 5000 INJECTION INTRAVENOUS; SUBCUTANEOUS at 07:13

## 2023-12-22 RX ADMIN — OXYCODONE HYDROCHLORIDE 60 MILLIGRAM(S): 5 TABLET ORAL at 18:35

## 2023-12-22 RX ADMIN — FAMOTIDINE 20 MILLIGRAM(S): 10 INJECTION INTRAVENOUS at 11:38

## 2023-12-22 RX ADMIN — OXYCODONE HYDROCHLORIDE 60 MILLIGRAM(S): 5 TABLET ORAL at 05:30

## 2023-12-22 RX ADMIN — Medication 25 MILLIGRAM(S): at 22:00

## 2023-12-22 RX ADMIN — Medication 40 MILLIGRAM(S): at 22:01

## 2023-12-22 RX ADMIN — LEVALBUTEROL 0.63 MILLIGRAM(S): 1.25 SOLUTION, CONCENTRATE RESPIRATORY (INHALATION) at 03:49

## 2023-12-22 RX ADMIN — TIOTROPIUM BROMIDE 2 PUFF(S): 18 CAPSULE ORAL; RESPIRATORY (INHALATION) at 09:14

## 2023-12-22 RX ADMIN — BUDESONIDE AND FORMOTEROL FUMARATE DIHYDRATE 2 PUFF(S): 160; 4.5 AEROSOL RESPIRATORY (INHALATION) at 09:14

## 2023-12-22 RX ADMIN — INSULIN GLARGINE 45 UNIT(S): 100 INJECTION, SOLUTION SUBCUTANEOUS at 09:42

## 2023-12-22 RX ADMIN — Medication 5 MILLIGRAM(S): at 07:53

## 2023-12-22 RX ADMIN — HEPARIN SODIUM 1200 UNIT(S)/HR: 5000 INJECTION INTRAVENOUS; SUBCUTANEOUS at 22:09

## 2023-12-22 RX ADMIN — Medication 10: at 05:43

## 2023-12-22 RX ADMIN — Medication 5 MG/HR: at 19:25

## 2023-12-22 RX ADMIN — Medication 12: at 13:43

## 2023-12-22 RX ADMIN — Medication 500 MICROGRAM(S): at 10:21

## 2023-12-22 RX ADMIN — Medication 0.25 MILLIGRAM(S): at 11:38

## 2023-12-22 RX ADMIN — Medication 40 MILLIGRAM(S): at 05:33

## 2023-12-22 RX ADMIN — PIPERACILLIN AND TAZOBACTAM 25 GRAM(S): 4; .5 INJECTION, POWDER, LYOPHILIZED, FOR SOLUTION INTRAVENOUS at 15:38

## 2023-12-22 RX ADMIN — Medication 35 UNIT(S): at 13:42

## 2023-12-22 RX ADMIN — HEPARIN SODIUM 2000 UNIT(S)/HR: 5000 INJECTION INTRAVENOUS; SUBCUTANEOUS at 02:15

## 2023-12-22 RX ADMIN — PIPERACILLIN AND TAZOBACTAM 25 GRAM(S): 4; .5 INJECTION, POWDER, LYOPHILIZED, FOR SOLUTION INTRAVENOUS at 05:35

## 2023-12-22 RX ADMIN — Medication 5 MG/HR: at 12:00

## 2023-12-22 RX ADMIN — HEPARIN SODIUM 0 UNIT(S)/HR: 5000 INJECTION INTRAVENOUS; SUBCUTANEOUS at 10:24

## 2023-12-22 RX ADMIN — Medication 10: at 18:36

## 2023-12-22 RX ADMIN — HEPARIN SODIUM 0 UNIT(S)/HR: 5000 INJECTION INTRAVENOUS; SUBCUTANEOUS at 20:06

## 2023-12-22 RX ADMIN — INSULIN GLARGINE 45 UNIT(S): 100 INJECTION, SOLUTION SUBCUTANEOUS at 22:07

## 2023-12-22 RX ADMIN — Medication 35 UNIT(S): at 18:35

## 2023-12-22 RX ADMIN — OXYCODONE HYDROCHLORIDE 60 MILLIGRAM(S): 5 TABLET ORAL at 19:05

## 2023-12-22 RX ADMIN — ATORVASTATIN CALCIUM 80 MILLIGRAM(S): 80 TABLET, FILM COATED ORAL at 22:00

## 2023-12-22 RX ADMIN — HEPARIN SODIUM 0 UNIT(S)/HR: 5000 INJECTION INTRAVENOUS; SUBCUTANEOUS at 01:00

## 2023-12-22 RX ADMIN — ONDANSETRON 4 MILLIGRAM(S): 8 TABLET, FILM COATED ORAL at 01:02

## 2023-12-22 RX ADMIN — Medication 35 UNIT(S): at 09:42

## 2023-12-22 RX ADMIN — PIPERACILLIN AND TAZOBACTAM 25 GRAM(S): 4; .5 INJECTION, POWDER, LYOPHILIZED, FOR SOLUTION INTRAVENOUS at 22:00

## 2023-12-22 RX ADMIN — SENNA PLUS 2 TABLET(S): 8.6 TABLET ORAL at 22:00

## 2023-12-22 RX ADMIN — Medication 8: at 00:13

## 2023-12-22 RX ADMIN — LACTULOSE 20 GRAM(S): 10 SOLUTION ORAL at 13:43

## 2023-12-22 RX ADMIN — Medication 25 MILLIGRAM(S): at 05:29

## 2023-12-22 RX ADMIN — LEVALBUTEROL 0.63 MILLIGRAM(S): 1.25 SOLUTION, CONCENTRATE RESPIRATORY (INHALATION) at 09:15

## 2023-12-22 RX ADMIN — Medication 40 MILLIGRAM(S): at 15:38

## 2023-12-22 RX ADMIN — HEPARIN SODIUM 1600 UNIT(S)/HR: 5000 INJECTION INTRAVENOUS; SUBCUTANEOUS at 19:24

## 2023-12-22 NOTE — PROGRESS NOTE ADULT - ASSESSMENT
Acute on chronic Hypercapnic hypoxemic resp failure  COPD,RSV, HFpEF, Morbid obesity, chronic pain on narcotics  New Consolidation RLL, ABG with met alkalosis, prn diamox  Hx PE/DVT, duplex and D dimer negative, AC held for ? cath in AM , will discuss with cardiology  Continue abx, nebs, medrol, gentle diuresis,   New onset a fib, on cardizem, xopenex /atrovent nebs  continue NIV nocturnal and prn, wean HFNC to cannula  Never seen by Pulmonary in office, will need nocturnal NIV to decrease re admissions and improve mortality, C02 remains elevated despite bipap  HOB elevated  cardiology input noted, LHC/RHC on hold  prognosis guarded

## 2023-12-22 NOTE — PROGRESS NOTE ADULT - NUTRITIONAL ASSESSMENT
This patient has been assessed with a concern for Malnutrition and has been determined to have a diagnosis/diagnoses of Morbid obesity (BMI > 40).    This patient is being managed with:   Diet Consistent Carbohydrate w/Evening Snack-  1500mL Fluid Restriction ([id number])  Low Sodium  Entered: Dec 19 2023  6:23PM   This patient has been assessed with a concern for Malnutrition and has been determined to have a diagnosis/diagnoses of Morbid obesity (BMI > 40).    This patient is being managed with:   Diet Consistent Carbohydrate w/Evening Snack-  1500mL Fluid Restriction ([id number])  Low Sodium  Entered: Dec 19 2023  6:23PM

## 2023-12-22 NOTE — PROGRESS NOTE ADULT - SUBJECTIVE AND OBJECTIVE BOX
PULMONARY PROGRESS NOTE      [id number]    Patient is a 68y old  Female who presents with a chief complaint of SOB (22 Dec 2023 11:49)      INTERVAL HPI/OVERNIGHT EVENTS:  alert, nad  no cp  on HFNC 40L/45% sp02 98%  MEDICATIONS  (STANDING):  atorvastatin 80 milliGRAM(s) Oral at bedtime  budesonide  80 MICROgram(s)/formoterol 4.5 MICROgram(s) Inhaler 2 Puff(s) Inhalation two times a day  dextrose 5%. 1000 milliLiter(s) (100 mL/Hr) IV Continuous <Continuous>  dextrose 5%. 1000 milliLiter(s) (50 mL/Hr) IV Continuous <Continuous>  dextrose 50% Injectable 12.5 Gram(s) IV Push once  dextrose 50% Injectable 25 Gram(s) IV Push once  dextrose 50% Injectable 25 Gram(s) IV Push once  diltiazem Infusion 5 mG/Hr (5 mL/Hr) IV Continuous <Continuous>  famotidine    Tablet 20 milliGRAM(s) Oral daily  furosemide   Injectable 40 milliGRAM(s) IV Push daily  glucagon  Injectable 1 milliGRAM(s) IntraMuscular once  heparin  Infusion.  Unit(s)/Hr (24 mL/Hr) IV Continuous <Continuous>  insulin glargine Injectable (LANTUS) 45 Unit(s) SubCutaneous at bedtime  insulin glargine Injectable (LANTUS) 45 Unit(s) SubCutaneous every morning  insulin lispro (ADMELOG) corrective regimen sliding scale   SubCutaneous three times a day before meals  insulin lispro Injectable (ADMELOG) 35 Unit(s) SubCutaneous three times a day before meals  ipratropium    for Nebulization 500 MICROGram(s) Nebulizer every 6 hours  lactulose Syrup 20 Gram(s) Oral daily  levalbuterol Inhalation 0.63 milliGRAM(s) Inhalation every 6 hours  methylPREDNISolone sodium succinate Injectable 40 milliGRAM(s) IV Push every 8 hours  metoprolol tartrate 25 milliGRAM(s) Oral every 8 hours  oxyCODONE  ER Tablet 60 milliGRAM(s) Oral every 12 hours  piperacillin/tazobactam IVPB.- 3.375 Gram(s) IV Intermittent once  piperacillin/tazobactam IVPB.. 3.375 Gram(s) IV Intermittent every 8 hours  polyethylene glycol 3350 17 Gram(s) Oral daily  senna 2 Tablet(s) Oral at bedtime      MEDICATIONS  (PRN):  acetaminophen     Tablet .. 650 milliGRAM(s) Oral every 6 hours PRN Temp greater or equal to 38C (100.4F), Mild Pain (1 - 3)  albuterol    90 MICROgram(s) HFA Inhaler 2 Puff(s) Inhalation every 2 hours PRN Shortness of Breath and/or Wheezing  ALPRAZolam 0.25 milliGRAM(s) Oral every 8 hours PRN for anxiety/Sleep  dextrose Oral Gel 15 Gram(s) Oral once PRN Blood Glucose LESS THAN 70 milliGRAM(s)/deciliter  diphenhydrAMINE 25 milliGRAM(s) Oral every 6 hours PRN Rash and/or Itching  heparin   Injectable 5000 Unit(s) IV Push every 6 hours PRN For aPTT between 40 - 57  heparin   Injectable 40265 Unit(s) IV Push every 6 hours PRN For aPTT less than 40  hydrALAZINE Injectable 10 milliGRAM(s) IV Push every 4 hours PRN for systolic bp > 160  HYDROmorphone  Injectable 1 milliGRAM(s) IV Push every 4 hours PRN breakthrough  pain  HYDROmorphone  Injectable 0.5 milliGRAM(s) IV Push every 6 hours PRN Severe Pain (7 - 10)  melatonin 3 milliGRAM(s) Oral at bedtime PRN Insomnia  metoprolol tartrate Injectable 5 milliGRAM(s) IV Push every 6 hours PRN heart rate sustaining greater than 130  ondansetron Injectable 4 milliGRAM(s) IV Push every 8 hours PRN Nausea and/or Vomiting  oxyCODONE    IR 5 milliGRAM(s) Oral every 6 hours PRN Moderate Pain (4 - 6)      Allergies    wool- rash, itch (Other)  adhesives (Rash)  latex (Rash)  Bactrim (Flushing)    Intolerances        PAST MEDICAL & SURGICAL HISTORY:  Diabetes Mellitus Type II      HTN (Hypertension)      Endometrial Hyperplasia      Cervical Stenosis of Spine      Spinal Stenosis, Lumbar      Deep Vein Thrombosis (DVT)  Left leg, 2004, treated and resolved      Dyslipidemia      Cataract      Morbid Obesity      Vitamin D deficiency      Insomnia      CKD (chronic kidney disease)  ~ III      Congestive heart failure  ~ HFpEF      Uterine cancer      Asthma      On home oxygen therapy      Gait difficulty  ~ u ses walker      Cataract extracted with lens implant 1998  Right      C Section 1994      Cholecystectomy/appendectomy @ age 25      D&C x2 1980's      D&C 2008  hysteroscopy, endometrial hyperplasia, 2009      Cervical Spinal Stenosis surgery x2 (01/2002, 7/2002)      Tonsillectomy as a child      Endometrial biopsy 12/02/09      H/O laser iridotomy  left eye, 2016      H/O colonoscopy  1998      S/P total abdominal hysterectomy and bilateral salpingo-oophorectomy      S/P appendectomy      S/P cholecystectomy          SOCIAL HISTORY  Smoking History:       REVIEW OF SYSTEMS:    CONSTITUTIONAL:  No distress    HEENT:  Eyes:  No diplopia or blurred vision. ENT:  No earache, sore throat or runny nose.    CARDIOVASCULAR:  No pressure, squeezing, tightness, heaviness or aching about the chest; no palpitations.    RESPIRATORY:  see HPI    GASTROINTESTINAL:  No nausea, vomiting or diarrhea.    GENITOURINARY:  No dysuria, frequency or urgency.    NEUROLOGIC:  No paresthesias, fasciculations, seizures or weakness.    PSYCHIATRIC:  No disorder of thought or mood.    Vital Signs Last 24 Hrs  T(C): 37.1 (22 Dec 2023 04:21), Max: 37.1 (21 Dec 2023 20:00)  T(F): 98.8 (22 Dec 2023 04:21), Max: 98.8 (21 Dec 2023 20:00)  HR: 142 (22 Dec 2023 14:00) (77 - 142)  BP: 95/55 (22 Dec 2023 14:00) (95/55 - 153/65)  BP(mean): 64 (22 Dec 2023 14:00) (64 - 90)  RR: 20 (22 Dec 2023 14:00) (17 - 22)  SpO2: 98% (22 Dec 2023 14:00) (89% - 100%)    Parameters below as of 22 Dec 2023 14:00  Patient On (Oxygen Delivery Method): nasal cannula, high flow  O2 Flow (L/min): 40  O2 Concentration (%): 45    PHYSICAL EXAMINATION:    GENERAL: The patient is awake and alert in no apparent distress.     HEENT: Head is normocephalic and atraumatic. Extraocular muscles are intact. Mucous membranes are moist.    NECK: Supple.    LUNGS: moderate air entry with exp rhonchi; respirations unlabored    HEART: Regular rate and rhythm without murmur.    ABDOMEN: Soft, nontender, and nondistended.      EXTREMITIES: Without any cyanosis, clubbing, rash, lesions or edema.    NEUROLOGIC: Grossly intact.    LABS:                        15.3   18.26 )-----------( 276      ( 22 Dec 2023 04:51 )             45.5     12-22    138  |  93<L>  |  58.9<H>  ----------------------------<  409<H>  4.7   |  35.0<H>  |  1.65<H>    Ca    9.0      22 Dec 2023 04:51  Phos  3.2     12-21  Mg     2.5     12-22      PT/INR - ( 21 Dec 2023 15:56 )   PT: 11.4 sec;   INR: 1.03 ratio         PTT - ( 22 Dec 2023 08:38 )  PTT:>200.0 sec  Urinalysis Basic - ( 22 Dec 2023 04:51 )    Color: x / Appearance: x / SG: x / pH: x  Gluc: 409 mg/dL / Ketone: x  / Bili: x / Urobili: x   Blood: x / Protein: x / Nitrite: x   Leuk Esterase: x / RBC: x / WBC x   Sq Epi: x / Non Sq Epi: x / Bacteria: x      ABG - ( 22 Dec 2023 06:23 )  pH, Arterial: 7.490 pH, Blood: x     /  pCO2: 52    /  pO2: 94    / HCO3: 40    / Base Excess: 16.3  /  SaO2: 98.3                        Procalcitonin, Serum: 0.11 ng/mL (12-22-23 @ 04:51)      MICROBIOLOGY:    RADIOLOGY & ADDITIONAL STUDIES: PULMONARY PROGRESS NOTE      [facility][id number]    Patient is a 68y old  Female who presents with a chief complaint of SOB (22 Dec 2023 11:49)      INTERVAL HPI/OVERNIGHT EVENTS:  alert, nad  no cp  on HFNC 40L/45% sp02 98%  MEDICATIONS  (STANDING):  atorvastatin 80 milliGRAM(s) Oral at bedtime  budesonide  80 MICROgram(s)/formoterol 4.5 MICROgram(s) Inhaler 2 Puff(s) Inhalation two times a day  dextrose 5%. 1000 milliLiter(s) (100 mL/Hr) IV Continuous <Continuous>  dextrose 5%. 1000 milliLiter(s) (50 mL/Hr) IV Continuous <Continuous>  dextrose 50% Injectable 12.5 Gram(s) IV Push once  dextrose 50% Injectable 25 Gram(s) IV Push once  dextrose 50% Injectable 25 Gram(s) IV Push once  diltiazem Infusion 5 mG/Hr (5 mL/Hr) IV Continuous <Continuous>  famotidine    Tablet 20 milliGRAM(s) Oral daily  furosemide   Injectable 40 milliGRAM(s) IV Push daily  glucagon  Injectable 1 milliGRAM(s) IntraMuscular once  heparin  Infusion.  Unit(s)/Hr (24 mL/Hr) IV Continuous <Continuous>  insulin glargine Injectable (LANTUS) 45 Unit(s) SubCutaneous at bedtime  insulin glargine Injectable (LANTUS) 45 Unit(s) SubCutaneous every morning  insulin lispro (ADMELOG) corrective regimen sliding scale   SubCutaneous three times a day before meals  insulin lispro Injectable (ADMELOG) 35 Unit(s) SubCutaneous three times a day before meals  ipratropium    for Nebulization 500 MICROGram(s) Nebulizer every 6 hours  lactulose Syrup 20 Gram(s) Oral daily  levalbuterol Inhalation 0.63 milliGRAM(s) Inhalation every 6 hours  methylPREDNISolone sodium succinate Injectable 40 milliGRAM(s) IV Push every 8 hours  metoprolol tartrate 25 milliGRAM(s) Oral every 8 hours  oxyCODONE  ER Tablet 60 milliGRAM(s) Oral every 12 hours  piperacillin/tazobactam IVPB.- 3.375 Gram(s) IV Intermittent once  piperacillin/tazobactam IVPB.. 3.375 Gram(s) IV Intermittent every 8 hours  polyethylene glycol 3350 17 Gram(s) Oral daily  senna 2 Tablet(s) Oral at bedtime      MEDICATIONS  (PRN):  acetaminophen     Tablet .. 650 milliGRAM(s) Oral every 6 hours PRN Temp greater or equal to 38C (100.4F), Mild Pain (1 - 3)  albuterol    90 MICROgram(s) HFA Inhaler 2 Puff(s) Inhalation every 2 hours PRN Shortness of Breath and/or Wheezing  ALPRAZolam 0.25 milliGRAM(s) Oral every 8 hours PRN for anxiety/Sleep  dextrose Oral Gel 15 Gram(s) Oral once PRN Blood Glucose LESS THAN 70 milliGRAM(s)/deciliter  diphenhydrAMINE 25 milliGRAM(s) Oral every 6 hours PRN Rash and/or Itching  heparin   Injectable 5000 Unit(s) IV Push every 6 hours PRN For aPTT between 40 - 57  heparin   Injectable 22820 Unit(s) IV Push every 6 hours PRN For aPTT less than 40  hydrALAZINE Injectable 10 milliGRAM(s) IV Push every 4 hours PRN for systolic bp > 160  HYDROmorphone  Injectable 1 milliGRAM(s) IV Push every 4 hours PRN breakthrough  pain  HYDROmorphone  Injectable 0.5 milliGRAM(s) IV Push every 6 hours PRN Severe Pain (7 - 10)  melatonin 3 milliGRAM(s) Oral at bedtime PRN Insomnia  metoprolol tartrate Injectable 5 milliGRAM(s) IV Push every 6 hours PRN heart rate sustaining greater than 130  ondansetron Injectable 4 milliGRAM(s) IV Push every 8 hours PRN Nausea and/or Vomiting  oxyCODONE    IR 5 milliGRAM(s) Oral every 6 hours PRN Moderate Pain (4 - 6)      Allergies    wool- rash, itch (Other)  adhesives (Rash)  latex (Rash)  Bactrim (Flushing)    Intolerances        PAST MEDICAL & SURGICAL HISTORY:  Diabetes Mellitus Type II      HTN (Hypertension)      Endometrial Hyperplasia      Cervical Stenosis of Spine      Spinal Stenosis, Lumbar      Deep Vein Thrombosis (DVT)  Left leg, 2004, treated and resolved      Dyslipidemia      Cataract      Morbid Obesity      Vitamin D deficiency      Insomnia      CKD (chronic kidney disease)  ~ III      Congestive heart failure  ~ HFpEF      Uterine cancer      Asthma      On home oxygen therapy      Gait difficulty  ~ u ses walker      Cataract extracted with lens implant 1998  Right      C Section 1994      Cholecystectomy/appendectomy @ age 25      D&C x2 1980's      D&C 2008  hysteroscopy, endometrial hyperplasia, 2009      Cervical Spinal Stenosis surgery x2 (01/2002, 7/2002)      Tonsillectomy as a child      Endometrial biopsy 12/02/09      H/O laser iridotomy  left eye, 2016      H/O colonoscopy  1998      S/P total abdominal hysterectomy and bilateral salpingo-oophorectomy      S/P appendectomy      S/P cholecystectomy          SOCIAL HISTORY  Smoking History:       REVIEW OF SYSTEMS:    CONSTITUTIONAL:  No distress    HEENT:  Eyes:  No diplopia or blurred vision. ENT:  No earache, sore throat or runny nose.    CARDIOVASCULAR:  No pressure, squeezing, tightness, heaviness or aching about the chest; no palpitations.    RESPIRATORY:  see HPI    GASTROINTESTINAL:  No nausea, vomiting or diarrhea.    GENITOURINARY:  No dysuria, frequency or urgency.    NEUROLOGIC:  No paresthesias, fasciculations, seizures or weakness.    PSYCHIATRIC:  No disorder of thought or mood.    Vital Signs Last 24 Hrs  T(C): 37.1 (22 Dec 2023 04:21), Max: 37.1 (21 Dec 2023 20:00)  T(F): 98.8 (22 Dec 2023 04:21), Max: 98.8 (21 Dec 2023 20:00)  HR: 142 (22 Dec 2023 14:00) (77 - 142)  BP: 95/55 (22 Dec 2023 14:00) (95/55 - 153/65)  BP(mean): 64 (22 Dec 2023 14:00) (64 - 90)  RR: 20 (22 Dec 2023 14:00) (17 - 22)  SpO2: 98% (22 Dec 2023 14:00) (89% - 100%)    Parameters below as of 22 Dec 2023 14:00  Patient On (Oxygen Delivery Method): nasal cannula, high flow  O2 Flow (L/min): 40  O2 Concentration (%): 45    PHYSICAL EXAMINATION:    GENERAL: The patient is awake and alert in no apparent distress.     HEENT: Head is normocephalic and atraumatic. Extraocular muscles are intact. Mucous membranes are moist.    NECK: Supple.    LUNGS: moderate air entry with exp rhonchi; respirations unlabored    HEART: Regular rate and rhythm without murmur.    ABDOMEN: Soft, nontender, and nondistended.      EXTREMITIES: Without any cyanosis, clubbing, rash, lesions or edema.    NEUROLOGIC: Grossly intact.    LABS:                        15.3   18.26 )-----------( 276      ( 22 Dec 2023 04:51 )             45.5     12-22    138  |  93<L>  |  58.9<H>  ----------------------------<  409<H>  4.7   |  35.0<H>  |  1.65<H>    Ca    9.0      22 Dec 2023 04:51  Phos  3.2     12-21  Mg     2.5     12-22      PT/INR - ( 21 Dec 2023 15:56 )   PT: 11.4 sec;   INR: 1.03 ratio         PTT - ( 22 Dec 2023 08:38 )  PTT:>200.0 sec  Urinalysis Basic - ( 22 Dec 2023 04:51 )    Color: x / Appearance: x / SG: x / pH: x  Gluc: 409 mg/dL / Ketone: x  / Bili: x / Urobili: x   Blood: x / Protein: x / Nitrite: x   Leuk Esterase: x / RBC: x / WBC x   Sq Epi: x / Non Sq Epi: x / Bacteria: x      ABG - ( 22 Dec 2023 06:23 )  pH, Arterial: 7.490 pH, Blood: x     /  pCO2: 52    /  pO2: 94    / HCO3: 40    / Base Excess: 16.3  /  SaO2: 98.3                        Procalcitonin, Serum: 0.11 ng/mL (12-22-23 @ 04:51)      MICROBIOLOGY:    RADIOLOGY & ADDITIONAL STUDIES:

## 2023-12-22 NOTE — PROGRESS NOTE ADULT - ASSESSMENT
69 y/o female with hx of HFpEF (EF 55-60% 8/2023), on home O2 4L, pulmonary HTN, was on Eliquis for presumed PE and severe pulmonary hypertension (PASP of 70 mmHg). super morbid obesity, asthma, CKD3, IDDM2, uterine cancer s/p MEGAN, DVT LLE (2004), chronic leg edema 2/2 venous stasis, spinal stenosis with chronic back pain who presents with midsternal chest pressure and shortness of breath. Patient converted to AFib on 12/21.

## 2023-12-22 NOTE — PROGRESS NOTE ADULT - SUBJECTIVE AND OBJECTIVE BOX
[facility]                                                         CARDIOLOGY AT [facility] (formerly [facility])                                                                  [address] [address]                                                         Telephone: [phone]. Fax:[phone]                                                                             PROGRESS NOTE    Reason for follow up: RSV +, HFpEF   Update: Patient converted to Afib with RVR with HR as high as 130s-140s. Heparin drip is infusing. HFNC 40L/40%. Patinet is currently in no acute distress.       Review of symptoms:   Cardiac:  No chest pain. No dyspnea. No palpitations.  Respiratory: no cough. No dyspnea  Gastrointestinal: No diarrhea. No abdominal pain. No bleeding.   Neuro: No focal neuro complaints.    Vitals:  T(C): 37.1 (12-22-23 @ 04:21), Max: 37.1 (12-21-23 @ 20:00)  HR: 139 (12-22-23 @ 07:50) (72 - 139)  BP: 118/80 (12-22-23 @ 07:50) (102/69 - 153/65)  RR: 18 (12-22-23 @ 07:50) (17 - 19)  SpO2: 89% (12-22-23 @ 07:50) (89% - 100%)  Wt(kg): --  I&O's Summary    21 Dec 2023 07:01  -  22 Dec 2023 07:00  --------------------------------------------------------  IN: 490 mL / OUT: 2200 mL / NET: -1710 mL          PHYSICAL EXAM:  Appearance: Comfortable. No acute distress  HEENT:  Atraumatic. Normocephalic.  Normal oral mucosa  Neurologic: A & O x 3, no gross focal deficits.  Cardiovascular: irregularly irregular, No murmur, no rubs/gallops. No JVD  Respiratory: B/L wheezing.  unlabored   Gastrointestinal:  Soft, Non-tender, + BS  Lower Extremities: 2+ Peripheral Pulses, No clubbing, cyanosis, or edema  Psychiatry: Patient is calm. No agitation.   Skin: warm and dry.    CURRENT CARDIAC MEDICATIONS:  furosemide   Injectable 40 milliGRAM(s) IV Push daily  hydrALAZINE Injectable 10 milliGRAM(s) IV Push every 4 hours PRN  metoprolol tartrate 25 milliGRAM(s) Oral every 8 hours  metoprolol tartrate Injectable 5 milliGRAM(s) IV Push every 6 hours PRN      CURRENT OTHER MEDICATIONS:  albuterol    90 MICROgram(s) HFA Inhaler 2 Puff(s) Inhalation every 2 hours PRN Shortness of Breath and/or Wheezing  budesonide  80 MICROgram(s)/formoterol 4.5 MICROgram(s) Inhaler 2 Puff(s) Inhalation two times a day  levalbuterol Inhalation 0.63 milliGRAM(s) Inhalation every 6 hours  tiotropium 2.5 MICROgram(s) Inhaler 2 Puff(s) Inhalation daily  piperacillin/tazobactam IVPB.- 3.375 Gram(s) IV Intermittent once  piperacillin/tazobactam IVPB.. 3.375 Gram(s) IV Intermittent every 8 hours  acetaminophen     Tablet .. 650 milliGRAM(s) Oral every 6 hours PRN Temp greater or equal to 38C (100.4F), Mild Pain (1 - 3)  ALPRAZolam 0.25 milliGRAM(s) Oral every 8 hours PRN for anxiety/Sleep  diphenhydrAMINE 25 milliGRAM(s) Oral every 6 hours PRN Rash and/or Itching  HYDROmorphone  Injectable 0.5 milliGRAM(s) IV Push every 6 hours PRN Severe Pain (7 - 10)  HYDROmorphone  Injectable 1 milliGRAM(s) IV Push every 4 hours PRN breakthrough  pain  melatonin 3 milliGRAM(s) Oral at bedtime PRN Insomnia  ondansetron Injectable 4 milliGRAM(s) IV Push every 8 hours PRN Nausea and/or Vomiting  oxyCODONE    IR 5 milliGRAM(s) Oral every 6 hours PRN Moderate Pain (4 - 6)  oxyCODONE  ER Tablet 60 milliGRAM(s) Oral every 12 hours  famotidine    Tablet 20 milliGRAM(s) Oral daily  lactulose Syrup 20 Gram(s) Oral daily  polyethylene glycol 3350 17 Gram(s) Oral daily  senna 2 Tablet(s) Oral at bedtime  atorvastatin 80 milliGRAM(s) Oral at bedtime  dextrose 50% Injectable 12.5 Gram(s) IV Push once, Stop order after: 1 Doses  dextrose 50% Injectable 25 Gram(s) IV Push once, Stop order after: 1 Doses  dextrose 50% Injectable 25 Gram(s) IV Push once, Stop order after: 1 Doses  dextrose Oral Gel 15 Gram(s) Oral once, Stop order after: 1 Doses PRN Blood Glucose LESS THAN 70 milliGRAM(s)/deciliter  glucagon  Injectable 1 milliGRAM(s) IntraMuscular once, Stop order after: 1 Doses  insulin glargine Injectable (LANTUS) 45 Unit(s) SubCutaneous at bedtime  insulin glargine Injectable (LANTUS) 45 Unit(s) SubCutaneous every morning  insulin lispro (ADMELOG) corrective regimen sliding scale   SubCutaneous every 6 hours  insulin lispro Injectable (ADMELOG) 35 Unit(s) SubCutaneous three times a day before meals  methylPREDNISolone sodium succinate Injectable 40 milliGRAM(s) IV Push every 8 hours  dextrose 5%. 1000 milliLiter(s) (50 mL/Hr) IV Continuous <Continuous>  dextrose 5%. 1000 milliLiter(s) (100 mL/Hr) IV Continuous <Continuous>  heparin   Injectable 21985 Unit(s) IV Push every 6 hours PRN For aPTT less than 40  heparin   Injectable 5000 Unit(s) IV Push every 6 hours PRN For aPTT between 40 - 57  heparin  Infusion.  Unit(s)/Hr (24 mL/Hr) IV Continuous <Continuous>      LABS:	 	                            15.3   18.26 )-----------( 276      ( 22 Dec 2023 04:51 )             45.5     12-22    138  |  93<L>  |  58.9<H>  ----------------------------<  409<H>  4.7   |  35.0<H>  |  1.65<H>    Ca    9.0      22 Dec 2023 04:51  Phos  3.2     12-21  Mg     2.5     12-22      PT/INR/PTT ( 21 Dec 2023 23:30 )                       :                       :      X            :       >200.0                 .        .                   .              .           .       X           .                                       Lipid Profile: Date: 12-17 @ 08:20  Total cholesterol 140; Direct LDL: --; HDL: 70; Triglycerides:133    HgA1c:   TSH: Thyroid Stimulating Hormone, Serum: 0.34 uIU/mL  Thyroid Stimulating Hormone, Serum: 1.51 uIU/mL  Thyroid Stimulating Hormone, Serum: 3.02 uIU/mL      TELEMETRY: Afib   ECG: Afib     DIAGNOSTIC TESTING:  [x  ] Echocardiogram: < from: TTE W or WO Ultrasound Enhancing Agent (12.18.23 @ 13:36) >     1. Technically difficult image quality.   2. Normal biventricular systolic function.   3. Compared to the transthoracic echocardiogram performed on 8/31/2023.      < end of copied text >    [ ]  Catheterization:  [ ] Stress Test:    OTHER: 	                                                                [facility]                                                         CARDIOLOGY AT [facility]                                                                  [address] [address]                                                         Telephone: [phone]. Fax:[phone]                                                                             PROGRESS NOTE    Reason for follow up: RSV +, HFpEF   Update: Patient converted to Afib with RVR with HR as high as 130s-140s. Heparin drip is infusing. HFNC 40L/40%. Patinet is currently in no acute distress.       Review of symptoms:   Cardiac:  No chest pain. No dyspnea. No palpitations.  Respiratory: no cough. No dyspnea  Gastrointestinal: No diarrhea. No abdominal pain. No bleeding.   Neuro: No focal neuro complaints.    Vitals:  T(C): 37.1 (12-22-23 @ 04:21), Max: 37.1 (12-21-23 @ 20:00)  HR: 139 (12-22-23 @ 07:50) (72 - 139)  BP: 118/80 (12-22-23 @ 07:50) (102/69 - 153/65)  RR: 18 (12-22-23 @ 07:50) (17 - 19)  SpO2: 89% (12-22-23 @ 07:50) (89% - 100%)  Wt(kg): --  I&O's Summary    21 Dec 2023 07:01  -  22 Dec 2023 07:00  --------------------------------------------------------  IN: 490 mL / OUT: 2200 mL / NET: -1710 mL          PHYSICAL EXAM:  Appearance: Comfortable. No acute distress  HEENT:  Atraumatic. Normocephalic.  Normal oral mucosa  Neurologic: A & O x 3, no gross focal deficits.  Cardiovascular: irregularly irregular, No murmur, no rubs/gallops. No JVD  Respiratory: B/L wheezing.  unlabored   Gastrointestinal:  Soft, Non-tender, + BS  Lower Extremities: 2+ Peripheral Pulses, No clubbing, cyanosis, or edema  Psychiatry: Patient is calm. No agitation.   Skin: warm and dry.    CURRENT CARDIAC MEDICATIONS:  furosemide   Injectable 40 milliGRAM(s) IV Push daily  hydrALAZINE Injectable 10 milliGRAM(s) IV Push every 4 hours PRN  metoprolol tartrate 25 milliGRAM(s) Oral every 8 hours  metoprolol tartrate Injectable 5 milliGRAM(s) IV Push every 6 hours PRN      CURRENT OTHER MEDICATIONS:  albuterol    90 MICROgram(s) HFA Inhaler 2 Puff(s) Inhalation every 2 hours PRN Shortness of Breath and/or Wheezing  budesonide  80 MICROgram(s)/formoterol 4.5 MICROgram(s) Inhaler 2 Puff(s) Inhalation two times a day  levalbuterol Inhalation 0.63 milliGRAM(s) Inhalation every 6 hours  tiotropium 2.5 MICROgram(s) Inhaler 2 Puff(s) Inhalation daily  piperacillin/tazobactam IVPB.- 3.375 Gram(s) IV Intermittent once  piperacillin/tazobactam IVPB.. 3.375 Gram(s) IV Intermittent every 8 hours  acetaminophen     Tablet .. 650 milliGRAM(s) Oral every 6 hours PRN Temp greater or equal to 38C (100.4F), Mild Pain (1 - 3)  ALPRAZolam 0.25 milliGRAM(s) Oral every 8 hours PRN for anxiety/Sleep  diphenhydrAMINE 25 milliGRAM(s) Oral every 6 hours PRN Rash and/or Itching  HYDROmorphone  Injectable 0.5 milliGRAM(s) IV Push every 6 hours PRN Severe Pain (7 - 10)  HYDROmorphone  Injectable 1 milliGRAM(s) IV Push every 4 hours PRN breakthrough  pain  melatonin 3 milliGRAM(s) Oral at bedtime PRN Insomnia  ondansetron Injectable 4 milliGRAM(s) IV Push every 8 hours PRN Nausea and/or Vomiting  oxyCODONE    IR 5 milliGRAM(s) Oral every 6 hours PRN Moderate Pain (4 - 6)  oxyCODONE  ER Tablet 60 milliGRAM(s) Oral every 12 hours  famotidine    Tablet 20 milliGRAM(s) Oral daily  lactulose Syrup 20 Gram(s) Oral daily  polyethylene glycol 3350 17 Gram(s) Oral daily  senna 2 Tablet(s) Oral at bedtime  atorvastatin 80 milliGRAM(s) Oral at bedtime  dextrose 50% Injectable 12.5 Gram(s) IV Push once, Stop order after: 1 Doses  dextrose 50% Injectable 25 Gram(s) IV Push once, Stop order after: 1 Doses  dextrose 50% Injectable 25 Gram(s) IV Push once, Stop order after: 1 Doses  dextrose Oral Gel 15 Gram(s) Oral once, Stop order after: 1 Doses PRN Blood Glucose LESS THAN 70 milliGRAM(s)/deciliter  glucagon  Injectable 1 milliGRAM(s) IntraMuscular once, Stop order after: 1 Doses  insulin glargine Injectable (LANTUS) 45 Unit(s) SubCutaneous at bedtime  insulin glargine Injectable (LANTUS) 45 Unit(s) SubCutaneous every morning  insulin lispro (ADMELOG) corrective regimen sliding scale   SubCutaneous every 6 hours  insulin lispro Injectable (ADMELOG) 35 Unit(s) SubCutaneous three times a day before meals  methylPREDNISolone sodium succinate Injectable 40 milliGRAM(s) IV Push every 8 hours  dextrose 5%. 1000 milliLiter(s) (50 mL/Hr) IV Continuous <Continuous>  dextrose 5%. 1000 milliLiter(s) (100 mL/Hr) IV Continuous <Continuous>  heparin   Injectable 51418 Unit(s) IV Push every 6 hours PRN For aPTT less than 40  heparin   Injectable 5000 Unit(s) IV Push every 6 hours PRN For aPTT between 40 - 57  heparin  Infusion.  Unit(s)/Hr (24 mL/Hr) IV Continuous <Continuous>      LABS:	 	                            15.3   18.26 )-----------( 276      ( 22 Dec 2023 04:51 )             45.5     12-22    138  |  93<L>  |  58.9<H>  ----------------------------<  409<H>  4.7   |  35.0<H>  |  1.65<H>    Ca    9.0      22 Dec 2023 04:51  Phos  3.2     12-21  Mg     2.5     12-22      PT/INR/PTT ( 21 Dec 2023 23:30 )                       :                       :      X            :       >200.0                 .        .                   .              .           .       X           .                                       Lipid Profile: Date: 12-17 @ 08:20  Total cholesterol 140; Direct LDL: --; HDL: 70; Triglycerides:133    HgA1c:   TSH: Thyroid Stimulating Hormone, Serum: 0.34 uIU/mL  Thyroid Stimulating Hormone, Serum: 1.51 uIU/mL  Thyroid Stimulating Hormone, Serum: 3.02 uIU/mL      TELEMETRY: Afib   ECG: Afib     DIAGNOSTIC TESTING:  [x  ] Echocardiogram: < from: TTE W or WO Ultrasound Enhancing Agent (12.18.23 @ 13:36) >     1. Technically difficult image quality.   2. Normal biventricular systolic function.   3. Compared to the transthoracic echocardiogram performed on 8/31/2023.      < end of copied text >    [ ]  Catheterization:  [ ] Stress Test:    OTHER:

## 2023-12-22 NOTE — PROGRESS NOTE ADULT - ASSESSMENT
69 yo female with HTN, HLD, DM2, HFpEF, CKD III, DVT, Uterine CA, COPD on home o2 who presented with complaints of shortness of breath. Patient reports that she has been sick all week and was recently started on Vantin and steroids while at the nursing home. Patient reports that her dyspnea just worsened and she told the nursing home to send her in for an evaluation. While in the ED, patient was placed on BIPAP and was given IV lasix with some improvement. RVP then results as RSV +. Patient had a CT scan which also showed pneumonia and pulmonary HTN. Admission to medicine was requested for further management.    #new afib:  echo w/ preserved EF    no DCCV given resp issues    cardiology following    s/p dig x1    heparin drip    cardizem drip    lopressor 25mg TID, increase as tolerated    albuterol--> xopenex    #Acute on chronic respiratory failure/ likely multifactorial   #Acute HFpEF exacerbation  # likely with Superimposed bacterial Pneumonia in setting of RSV  #Pulmonary hypertension  ct noted with rt lung pna   BNP elevated  +RSV  c/w Zosynx 7days, doxy dc as legionella negative  IV steroids  negative Blood cx  c/w IV Lasix QD  Cardiology following, plan for cath when respiratory status better: likely next week   BIPAP- wean as tolerated   pulm following     #Chronic pain  continue home Pain meds of Oxycodone 60 mg q12h and Oxycodone 5mg q6h PRN    #hx of suspicion for PE. DVT  Patient has empirically been treated for PE/DVT since August  No scans noted to have PE/DVT  duplex lower ext to without dvt   ddimer negative. dc eliquis per cardiology   c/w ppx HSQ    #HLD  Atorvastatin for rosuvastatin    #COPD  Steroids as above  Symbicort, Spiriva, albuterol  pulm following     #DM  #steroid induced hyperglycemia  lantus/premeal insulin per endo  endo following  a1c 8.3    DVT prophylaxis: HSQ  Dispo: pending resp status optimization, weaning off BIPAP, glucose monitoring, TTE and eventual cath     67 yo female with HTN, HLD, DM2, HFpEF, CKD III, DVT, Uterine CA, COPD on home o2 who presented with complaints of shortness of breath. Patient reports that she has been sick all week and was recently started on Vantin and steroids while at the nursing home. Patient reports that her dyspnea just worsened and she told the nursing home to send her in for an evaluation. While in the ED, patient was placed on BIPAP and was given IV lasix with some improvement. RVP then results as RSV +. Patient had a CT scan which also showed pneumonia and pulmonary HTN. Admission to medicine was requested for further management.    #new afib:  echo w/ preserved EF    no DCCV given resp issues    cardiology following    s/p dig x1    heparin drip    cardizem drip    lopressor 25mg TID, increase as tolerated    albuterol--> xopenex    #Acute on chronic respiratory failure/ likely multifactorial   #Acute HFpEF exacerbation  # likely with Superimposed bacterial Pneumonia in setting of RSV  #Pulmonary hypertension  ct noted with rt lung pna   BNP elevated  +RSV  c/w Zosynx 7days, doxy dc as legionella negative  IV steroids  negative Blood cx  c/w IV Lasix QD  Cardiology following, plan for cath when respiratory status better: likely next week   BIPAP- wean as tolerated   pulm following     #Chronic pain  continue home Pain meds of Oxycodone 60 mg q12h and Oxycodone 5mg q6h PRN    #hx of suspicion for PE. DVT  Patient has empirically been treated for PE/DVT since August  No scans noted to have PE/DVT  duplex lower ext to without dvt   ddimer negative. dc eliquis per cardiology   c/w ppx HSQ    #HLD  Atorvastatin for rosuvastatin    #COPD  Steroids as above  Symbicort, Spiriva, albuterol  pulm following     #DM  #steroid induced hyperglycemia  lantus/premeal insulin per endo  endo following  a1c 8.3    DVT prophylaxis: HSQ  Dispo: pending resp status optimization, weaning off BIPAP, glucose monitoring, TTE and eventual cath

## 2023-12-22 NOTE — PROGRESS NOTE ADULT - NS ATTEND AMEND GEN_ALL_CORE FT
seen with above,    68F super morbid obesity (BMI >60), ENRIQUE/OHS, chronic respiratory failure, severe pulmonary HTN with RV dysfunction, CKD 3, HFpEF, recent admission 9/2023 with hypoxic respiratory failure unable to get CTPA or VQ scan and was empirically treated with Eliquis now readmitted for acute on chronic hypoxemic respiratory failure on treatment with IV diuresis and empiric Zosyn with Solumedrol, +RSV as well    -was on nasal BiPAP and been on HFNC x3 day with sat 90%, gradual improvement in respiratory status, repeat weight <400 lbs  -diuresing well on IV Lasix 40mg once daily, Cr. stable, continue to aim for net negative output (prior admission Cr. peak at 2.1), she does not feel ready mentally for R/LHC today, will continue optimize to reattempt next week when she will be able to come off HFNC  -may need addition of acetazolamide if further uptrend in bicarb  -been in persistent Afib -140s since yesterday refractory to PO/IV metoprolol, given IV 0.5mg digoxin and started on dilitazem gtt @5mg/hr, continue to uptitrate for gaol resting HR 110s as long as BP tolerates  -prior SBP 90s, off amlodipine for now, may consider spironolactone or Entresto after cath if Cr. stable  -ABG with chronic CO2 retention, she is awake/alert and conversive, continue nocturnal BiPAP and pulmonary follow up.   -discontinued empiric Eliquis, normal D-dimer, but now on heparin gtt for pAfib  -need weight loss         Lenard Van DO, formerly [facility]  Faculty Non-Invasive Cardiologist  [phone] seen with above,    68F super morbid obesity (BMI >60), ENRIQUE/OHS, chronic respiratory failure, severe pulmonary HTN with RV dysfunction, CKD 3, HFpEF, recent admission 9/2023 with hypoxic respiratory failure unable to get CTPA or VQ scan and was empirically treated with Eliquis now readmitted for acute on chronic hypoxemic respiratory failure on treatment with IV diuresis and empiric Zosyn with Solumedrol, +RSV as well    -was on nasal BiPAP and been on HFNC x3 day with sat 90%, gradual improvement in respiratory status, repeat weight <400 lbs  -diuresing well on IV Lasix 40mg once daily, Cr. stable, continue to aim for net negative output (prior admission Cr. peak at 2.1), she does not feel ready mentally for R/LHC today, will continue optimize to reattempt next week when she will be able to come off HFNC  -may need addition of acetazolamide if further uptrend in bicarb  -been in persistent Afib -140s since yesterday refractory to PO/IV metoprolol, given IV 0.5mg digoxin and started on dilitazem gtt @5mg/hr, continue to uptitrate for gaol resting HR 110s as long as BP tolerates  -prior SBP 90s, off amlodipine for now, may consider spironolactone or Entresto after cath if Cr. stable  -ABG with chronic CO2 retention, she is awake/alert and conversive, continue nocturnal BiPAP and pulmonary follow up.   -discontinued empiric Eliquis, normal D-dimer, but now on heparin gtt for pAfib  -need weight loss         Lenard Van DO, [facility]  Faculty Non-Invasive Cardiologist  [phone]

## 2023-12-22 NOTE — PROGRESS NOTE ADULT - PROBLEM SELECTOR PLAN 1
- Pt is still fluid overloaded   - BUN/creatinine still elevated. Monitor BMP daily   - GDMT: C/w lasix 40 mg BID IV push. ACEi/ARBs on hold due to JACQUES. c/w Metoprolol 25 mg BID PO.   - Currently hypotensive, amlodipine on hold. Will consider spironolactone or entresto upon d/c and once SBP is above 100.   - Monitor on telemetry.  Strict i/o and daily weights.  Keep K > 4, Mg > 2.  Monitor renal function with ongoing diuresis.   - LHC/RHC on hold as pt does not feel mentally ready for it.   - Actual bed weight is 176.8 kg.   - Eliquis is on hold, D-Dimer is <150. Heparin subcutaneous for DVT ppx.

## 2023-12-22 NOTE — PROGRESS NOTE ADULT - ASSESSMENT
68F with obesity, HTN, HLD, DM2, HFpEF, CKD III, DVT, Uterine CA, COPD on oxygen who presented with complaints of shortness of breath, found to be RSV+. Patient had a CT scan which also showed pneumonia and pulmonary HTN.    Consulted for diabetes management  Home regimen: Lantus 30 units in morning/38 units in evening, lispro 20-25 units with meals  Current a1c: 8.3%    1. Uncontrolled DM with steroid induced hyperglycemia  -Elevated BG r/t recent change of splitting Lantus dose- will expect to see improvements throughout the day into tomorrow  - Continue with Lantus 45 units BID for better absorption  - Continue premeal admelog 35 units TID with meals  - Increase to high Sliding scale coverage    2. Acute on chronic respiratory failure/RSV/PNA  - IV Solumedrol  - Continue IV abx    3. HLD  - Continue statin

## 2023-12-22 NOTE — PROGRESS NOTE ADULT - PROBLEM SELECTOR PLAN 2
- pt converted to Afib on 12/21   -  Rate control with metoprolol 25 mg BID PO   - [id number] 6   - c/w heparin drip full anticoagulation nomogram.   - monitor on telemetry - pt converted to Afib on 12/21   -  Rate control with metoprolol 25 mg BID PO   - [id number] 6   - c/w heparin drip full anticoagulation nomogram.   - monitor on telemetry - pt converted to Afib on 12/21   - increased metoprolol to 25 mg q8h PO   - [id number] 6   - c/w heparin drip full anticoagulation nomogram.   - monitor on telemetry  - x1 digoxin 0.5 mg IV push - pt converted to Afib on 12/21   - increased metoprolol to 25 mg q8h PO   - [id number] 6   - c/w heparin drip full anticoagulation nomogram.   - monitor on telemetry  - x1 digoxin 0.5 mg IV push - pt converted to Afib on 12/21   - increased metoprolol to 25 mg q8h PO   - [id number] 6   - c/w heparin drip full anticoagulation nomogram.   - monitor on telemetry  - x1 digoxin 0.5 mg IV push  - Will start cardizem drip 5 mg /hr as pt is in AFlutter with HR as high as 140s - pt converted to Afib on 12/21   - increased metoprolol to 25 mg q8h PO   - [id number] 6   - c/w heparin drip full anticoagulation nomogram.   - monitor on telemetry  - x1 digoxin 0.5 mg IV push  - Will start cardizem drip 5 mg /hr as pt is in AFlutter with HR as high as 140s

## 2023-12-22 NOTE — PROGRESS NOTE ADULT - SUBJECTIVE AND OBJECTIVE BOX
INTERVAL EVENTS:  Follow up diabetes management  Steroid induced hyperglycemic    ROS: No acute pain at time of exam    MEDICATIONS  (STANDING):  atorvastatin 80 milliGRAM(s) Oral at bedtime  budesonide  80 MICROgram(s)/formoterol 4.5 MICROgram(s) Inhaler 2 Puff(s) Inhalation two times a day  dextrose 5%. 1000 milliLiter(s) (50 mL/Hr) IV Continuous <Continuous>  dextrose 5%. 1000 milliLiter(s) (100 mL/Hr) IV Continuous <Continuous>  dextrose 50% Injectable 25 Gram(s) IV Push once  dextrose 50% Injectable 12.5 Gram(s) IV Push once  dextrose 50% Injectable 25 Gram(s) IV Push once  digoxin  Injectable 500 MICROGram(s) IV Push once  famotidine    Tablet 20 milliGRAM(s) Oral daily  furosemide   Injectable 40 milliGRAM(s) IV Push daily  glucagon  Injectable 1 milliGRAM(s) IntraMuscular once  heparin  Infusion.  Unit(s)/Hr (24 mL/Hr) IV Continuous <Continuous>  insulin glargine Injectable (LANTUS) 45 Unit(s) SubCutaneous at bedtime  insulin glargine Injectable (LANTUS) 45 Unit(s) SubCutaneous every morning  insulin lispro (ADMELOG) corrective regimen sliding scale   SubCutaneous every 6 hours  insulin lispro Injectable (ADMELOG) 35 Unit(s) SubCutaneous three times a day before meals  ketorolac   Injectable 15 milliGRAM(s) IV Push once  lactulose Syrup 20 Gram(s) Oral daily  levalbuterol Inhalation 0.63 milliGRAM(s) Inhalation every 6 hours  methylPREDNISolone sodium succinate Injectable 40 milliGRAM(s) IV Push every 8 hours  metoprolol tartrate 25 milliGRAM(s) Oral every 8 hours  oxyCODONE  ER Tablet 60 milliGRAM(s) Oral every 12 hours  piperacillin/tazobactam IVPB.- 3.375 Gram(s) IV Intermittent once  piperacillin/tazobactam IVPB.. 3.375 Gram(s) IV Intermittent every 8 hours  polyethylene glycol 3350 17 Gram(s) Oral daily  senna 2 Tablet(s) Oral at bedtime  tiotropium 2.5 MICROgram(s) Inhaler 2 Puff(s) Inhalation daily    MEDICATIONS  (PRN):  acetaminophen     Tablet .. 650 milliGRAM(s) Oral every 6 hours PRN Temp greater or equal to 38C (100.4F), Mild Pain (1 - 3)  albuterol    90 MICROgram(s) HFA Inhaler 2 Puff(s) Inhalation every 2 hours PRN Shortness of Breath and/or Wheezing  ALPRAZolam 0.25 milliGRAM(s) Oral every 8 hours PRN for anxiety/Sleep  dextrose Oral Gel 15 Gram(s) Oral once PRN Blood Glucose LESS THAN 70 milliGRAM(s)/deciliter  diphenhydrAMINE 25 milliGRAM(s) Oral every 6 hours PRN Rash and/or Itching  heparin   Injectable 5000 Unit(s) IV Push every 6 hours PRN For aPTT between 40 - 57  heparin   Injectable 93953 Unit(s) IV Push every 6 hours PRN For aPTT less than 40  hydrALAZINE Injectable 10 milliGRAM(s) IV Push every 4 hours PRN for systolic bp > 160  HYDROmorphone  Injectable 1 milliGRAM(s) IV Push every 4 hours PRN breakthrough  pain  HYDROmorphone  Injectable 0.5 milliGRAM(s) IV Push every 6 hours PRN Severe Pain (7 - 10)  melatonin 3 milliGRAM(s) Oral at bedtime PRN Insomnia  metoprolol tartrate Injectable 5 milliGRAM(s) IV Push every 6 hours PRN heart rate sustaining greater than 130  ondansetron Injectable 4 milliGRAM(s) IV Push every 8 hours PRN Nausea and/or Vomiting  oxyCODONE    IR 5 milliGRAM(s) Oral every 6 hours PRN Moderate Pain (4 - 6)      Allergies  wool- rash, itch (Other)  adhesives (Rash)  latex (Rash)  Bactrim (Flushing)      Vital Signs Last 24 Hrs  T(C): 37.1 (22 Dec 2023 04:21), Max: 37.1 (21 Dec 2023 20:00)  T(F): 98.8 (22 Dec 2023 04:21), Max: 98.8 (21 Dec 2023 20:00)  HR: 142 (22 Dec 2023 10:25) (77 - 142)  BP: 105/67 (22 Dec 2023 10:25) (102/69 - 153/65)  BP(mean): 73 (22 Dec 2023 10:25) (66 - 105)  RR: 18 (22 Dec 2023 10:25) (17 - 19)  SpO2: 92% (22 Dec 2023 10:25) (89% - 100%)    Parameters below as of 22 Dec 2023 10:25  Patient On (Oxygen Delivery Method): nasal cannula, high flow  O2 Flow (L/min): 40  O2 Concentration (%): 45    PHYSICAL EXAM:  General: No apparent distress, obese  Neck: Supple, trachea midline, no thyromegaly  Respiratory: Lungs clear bilaterally, normal rate, effort  Cardiac: +S1, S2, no m/r/g  GI: +BS, soft, non tender, non distended  Extremities: No peripheral edema  Neuro: A+O X3, no tremor      LABS:                        15.3   18.26 )-----------( 276      ( 22 Dec 2023 04:51 )             45.5     12-22    138  |  93<L>  |  58.9<H>  ----------------------------<  409<H>  4.7   |  35.0<H>  |  1.65<H>    Ca    9.0      22 Dec 2023 04:51  Phos  3.2     12-21  Mg     2.5     12-22      Urinalysis Basic - ( 22 Dec 2023 04:51 )    Color: x / Appearance: x / SG: x / pH: x  Gluc: 409 mg/dL / Ketone: x  / Bili: x / Urobili: x   Blood: x / Protein: x / Nitrite: x   Leuk Esterase: x / RBC: x / WBC x   Sq Epi: x / Non Sq Epi: x / Bacteria: x          POCT Blood Glucose.: 403 mg/dL (12-22-23 @ 08:57)  POCT Blood Glucose.: 383 mg/dL (12-22-23 @ 05:41)  POCT Blood Glucose.: 317 mg/dL (12-22-23 @ 00:11)  POCT Blood Glucose.: 229 mg/dL (12-21-23 @ 22:08)  POCT Blood Glucose.: 310 mg/dL (12-21-23 @ 19:22)  POCT Blood Glucose.: 271 mg/dL (12-21-23 @ 17:32)  POCT Blood Glucose.: 283 mg/dL (12-21-23 @ 11:54)        Thyroid Stimulating Hormone, Serum: 0.34 uIU/mL (12-22-23 @ 04:51)  Thyroid Stimulating Hormone, Serum: 1.51 uIU/mL (12-16-23 @ 15:20)  Thyroid Stimulating Hormone, Serum: 3.02 uIU/mL (12-16-23 @ 10:18)     INTERVAL EVENTS:  Follow up diabetes management  Steroid induced hyperglycemic    ROS: No acute pain at time of exam    MEDICATIONS  (STANDING):  atorvastatin 80 milliGRAM(s) Oral at bedtime  budesonide  80 MICROgram(s)/formoterol 4.5 MICROgram(s) Inhaler 2 Puff(s) Inhalation two times a day  dextrose 5%. 1000 milliLiter(s) (50 mL/Hr) IV Continuous <Continuous>  dextrose 5%. 1000 milliLiter(s) (100 mL/Hr) IV Continuous <Continuous>  dextrose 50% Injectable 25 Gram(s) IV Push once  dextrose 50% Injectable 12.5 Gram(s) IV Push once  dextrose 50% Injectable 25 Gram(s) IV Push once  digoxin  Injectable 500 MICROGram(s) IV Push once  famotidine    Tablet 20 milliGRAM(s) Oral daily  furosemide   Injectable 40 milliGRAM(s) IV Push daily  glucagon  Injectable 1 milliGRAM(s) IntraMuscular once  heparin  Infusion.  Unit(s)/Hr (24 mL/Hr) IV Continuous <Continuous>  insulin glargine Injectable (LANTUS) 45 Unit(s) SubCutaneous at bedtime  insulin glargine Injectable (LANTUS) 45 Unit(s) SubCutaneous every morning  insulin lispro (ADMELOG) corrective regimen sliding scale   SubCutaneous every 6 hours  insulin lispro Injectable (ADMELOG) 35 Unit(s) SubCutaneous three times a day before meals  ketorolac   Injectable 15 milliGRAM(s) IV Push once  lactulose Syrup 20 Gram(s) Oral daily  levalbuterol Inhalation 0.63 milliGRAM(s) Inhalation every 6 hours  methylPREDNISolone sodium succinate Injectable 40 milliGRAM(s) IV Push every 8 hours  metoprolol tartrate 25 milliGRAM(s) Oral every 8 hours  oxyCODONE  ER Tablet 60 milliGRAM(s) Oral every 12 hours  piperacillin/tazobactam IVPB.- 3.375 Gram(s) IV Intermittent once  piperacillin/tazobactam IVPB.. 3.375 Gram(s) IV Intermittent every 8 hours  polyethylene glycol 3350 17 Gram(s) Oral daily  senna 2 Tablet(s) Oral at bedtime  tiotropium 2.5 MICROgram(s) Inhaler 2 Puff(s) Inhalation daily    MEDICATIONS  (PRN):  acetaminophen     Tablet .. 650 milliGRAM(s) Oral every 6 hours PRN Temp greater or equal to 38C (100.4F), Mild Pain (1 - 3)  albuterol    90 MICROgram(s) HFA Inhaler 2 Puff(s) Inhalation every 2 hours PRN Shortness of Breath and/or Wheezing  ALPRAZolam 0.25 milliGRAM(s) Oral every 8 hours PRN for anxiety/Sleep  dextrose Oral Gel 15 Gram(s) Oral once PRN Blood Glucose LESS THAN 70 milliGRAM(s)/deciliter  diphenhydrAMINE 25 milliGRAM(s) Oral every 6 hours PRN Rash and/or Itching  heparin   Injectable 5000 Unit(s) IV Push every 6 hours PRN For aPTT between 40 - 57  heparin   Injectable 59413 Unit(s) IV Push every 6 hours PRN For aPTT less than 40  hydrALAZINE Injectable 10 milliGRAM(s) IV Push every 4 hours PRN for systolic bp > 160  HYDROmorphone  Injectable 1 milliGRAM(s) IV Push every 4 hours PRN breakthrough  pain  HYDROmorphone  Injectable 0.5 milliGRAM(s) IV Push every 6 hours PRN Severe Pain (7 - 10)  melatonin 3 milliGRAM(s) Oral at bedtime PRN Insomnia  metoprolol tartrate Injectable 5 milliGRAM(s) IV Push every 6 hours PRN heart rate sustaining greater than 130  ondansetron Injectable 4 milliGRAM(s) IV Push every 8 hours PRN Nausea and/or Vomiting  oxyCODONE    IR 5 milliGRAM(s) Oral every 6 hours PRN Moderate Pain (4 - 6)      Allergies  wool- rash, itch (Other)  adhesives (Rash)  latex (Rash)  Bactrim (Flushing)      Vital Signs Last 24 Hrs  T(C): 37.1 (22 Dec 2023 04:21), Max: 37.1 (21 Dec 2023 20:00)  T(F): 98.8 (22 Dec 2023 04:21), Max: 98.8 (21 Dec 2023 20:00)  HR: 142 (22 Dec 2023 10:25) (77 - 142)  BP: 105/67 (22 Dec 2023 10:25) (102/69 - 153/65)  BP(mean): 73 (22 Dec 2023 10:25) (66 - 105)  RR: 18 (22 Dec 2023 10:25) (17 - 19)  SpO2: 92% (22 Dec 2023 10:25) (89% - 100%)    Parameters below as of 22 Dec 2023 10:25  Patient On (Oxygen Delivery Method): nasal cannula, high flow  O2 Flow (L/min): 40  O2 Concentration (%): 45    PHYSICAL EXAM:  General: No apparent distress, obese  Neck: Supple, trachea midline, no thyromegaly  Respiratory: Lungs clear bilaterally, normal rate, effort  Cardiac: +S1, S2, no m/r/g  GI: +BS, soft, non tender, non distended  Extremities: No peripheral edema  Neuro: A+O X3, no tremor      LABS:                        15.3   18.26 )-----------( 276      ( 22 Dec 2023 04:51 )             45.5     12-22    138  |  93<L>  |  58.9<H>  ----------------------------<  409<H>  4.7   |  35.0<H>  |  1.65<H>    Ca    9.0      22 Dec 2023 04:51  Phos  3.2     12-21  Mg     2.5     12-22      Urinalysis Basic - ( 22 Dec 2023 04:51 )    Color: x / Appearance: x / SG: x / pH: x  Gluc: 409 mg/dL / Ketone: x  / Bili: x / Urobili: x   Blood: x / Protein: x / Nitrite: x   Leuk Esterase: x / RBC: x / WBC x   Sq Epi: x / Non Sq Epi: x / Bacteria: x          POCT Blood Glucose.: 403 mg/dL (12-22-23 @ 08:57)  POCT Blood Glucose.: 383 mg/dL (12-22-23 @ 05:41)  POCT Blood Glucose.: 317 mg/dL (12-22-23 @ 00:11)  POCT Blood Glucose.: 229 mg/dL (12-21-23 @ 22:08)  POCT Blood Glucose.: 310 mg/dL (12-21-23 @ 19:22)  POCT Blood Glucose.: 271 mg/dL (12-21-23 @ 17:32)  POCT Blood Glucose.: 283 mg/dL (12-21-23 @ 11:54)        Thyroid Stimulating Hormone, Serum: 0.34 uIU/mL (12-22-23 @ 04:51)  Thyroid Stimulating Hormone, Serum: 1.51 uIU/mL (12-16-23 @ 15:20)  Thyroid Stimulating Hormone, Serum: 3.02 uIU/mL (12-16-23 @ 10:18)

## 2023-12-22 NOTE — PROGRESS NOTE ADULT - SUBJECTIVE AND OBJECTIVE BOX
seen for AFib, RSV    feels ok  tired  no chest pain  afib yesterday, maintained  visual symptoms resolved, negative CT head  headache persists  ros negative     MEDICATIONS  (STANDING):  atorvastatin 80 milliGRAM(s) Oral at bedtime  budesonide  80 MICROgram(s)/formoterol 4.5 MICROgram(s) Inhaler 2 Puff(s) Inhalation two times a day  dextrose 5%. 1000 milliLiter(s) (50 mL/Hr) IV Continuous <Continuous>  dextrose 5%. 1000 milliLiter(s) (100 mL/Hr) IV Continuous <Continuous>  dextrose 50% Injectable 12.5 Gram(s) IV Push once  dextrose 50% Injectable 25 Gram(s) IV Push once  dextrose 50% Injectable 25 Gram(s) IV Push once  diltiazem Infusion 5 mG/Hr (5 mL/Hr) IV Continuous <Continuous>  famotidine    Tablet 20 milliGRAM(s) Oral daily  furosemide   Injectable 40 milliGRAM(s) IV Push daily  glucagon  Injectable 1 milliGRAM(s) IntraMuscular once  heparin  Infusion.  Unit(s)/Hr (24 mL/Hr) IV Continuous <Continuous>  insulin glargine Injectable (LANTUS) 45 Unit(s) SubCutaneous every morning  insulin glargine Injectable (LANTUS) 45 Unit(s) SubCutaneous at bedtime  insulin lispro (ADMELOG) corrective regimen sliding scale   SubCutaneous three times a day before meals  insulin lispro Injectable (ADMELOG) 35 Unit(s) SubCutaneous three times a day before meals  lactulose Syrup 20 Gram(s) Oral daily  levalbuterol Inhalation 0.63 milliGRAM(s) Inhalation every 6 hours  methylPREDNISolone sodium succinate Injectable 40 milliGRAM(s) IV Push every 8 hours  metoprolol tartrate 25 milliGRAM(s) Oral every 8 hours  oxyCODONE  ER Tablet 60 milliGRAM(s) Oral every 12 hours  piperacillin/tazobactam IVPB.- 3.375 Gram(s) IV Intermittent once  piperacillin/tazobactam IVPB.. 3.375 Gram(s) IV Intermittent every 8 hours  polyethylene glycol 3350 17 Gram(s) Oral daily  senna 2 Tablet(s) Oral at bedtime  tiotropium 2.5 MICROgram(s) Inhaler 2 Puff(s) Inhalation daily    MEDICATIONS  (PRN):  acetaminophen     Tablet .. 650 milliGRAM(s) Oral every 6 hours PRN Temp greater or equal to 38C (100.4F), Mild Pain (1 - 3)  albuterol    90 MICROgram(s) HFA Inhaler 2 Puff(s) Inhalation every 2 hours PRN Shortness of Breath and/or Wheezing  ALPRAZolam 0.25 milliGRAM(s) Oral every 8 hours PRN for anxiety/Sleep  dextrose Oral Gel 15 Gram(s) Oral once PRN Blood Glucose LESS THAN 70 milliGRAM(s)/deciliter  diphenhydrAMINE 25 milliGRAM(s) Oral every 6 hours PRN Rash and/or Itching  heparin   Injectable 99799 Unit(s) IV Push every 6 hours PRN For aPTT less than 40  heparin   Injectable 5000 Unit(s) IV Push every 6 hours PRN For aPTT between 40 - 57  hydrALAZINE Injectable 10 milliGRAM(s) IV Push every 4 hours PRN for systolic bp > 160  HYDROmorphone  Injectable 1 milliGRAM(s) IV Push every 4 hours PRN breakthrough  pain  HYDROmorphone  Injectable 0.5 milliGRAM(s) IV Push every 6 hours PRN Severe Pain (7 - 10)  melatonin 3 milliGRAM(s) Oral at bedtime PRN Insomnia  metoprolol tartrate Injectable 5 milliGRAM(s) IV Push every 6 hours PRN heart rate sustaining greater than 130  ondansetron Injectable 4 milliGRAM(s) IV Push every 8 hours PRN Nausea and/or Vomiting  oxyCODONE    IR 5 milliGRAM(s) Oral every 6 hours PRN Moderate Pain (4 - 6)      Allergies    wool- rash, itch (Other)  adhesives (Rash)  latex (Rash)  Bactrim (Flushing)        Vital Signs Last 24 Hrs  T(C): 37.1 (22 Dec 2023 04:21), Max: 37.1 (21 Dec 2023 20:00)  T(F): 98.8 (22 Dec 2023 04:21), Max: 98.8 (21 Dec 2023 20:00)  HR: 142 (22 Dec 2023 10:25) (77 - 142)  BP: 105/67 (22 Dec 2023 10:25) (102/69 - 153/65)  BP(mean): 73 (22 Dec 2023 10:25) (66 - 105)  RR: 18 (22 Dec 2023 10:25) (17 - 19)  SpO2: 92% (22 Dec 2023 10:25) (89% - 100%)    Parameters below as of 22 Dec 2023 10:25  Patient On (Oxygen Delivery Method): nasal cannula, high flow  O2 Flow (L/min): 40  O2 Concentration (%): 45    PHYSICAL EXAM:    GENERAL: NAD  CHEST/LUNG: diffuse wheezing   HEART: irreg rate and rhythm; S1 S2  ABDOMEN: Soft, Nondistended; Bowel sounds present  EXTREMITIES:  lymphedema  gu de la torre   NERVOUS SYSTEM:  Alert & Oriented X3, nonfocal    LABS:                        15.3   18.26 )-----------( 276      ( 22 Dec 2023 04:51 )             45.5     12-22    138  |  93<L>  |  58.9<H>  ----------------------------<  409<H>  4.7   |  35.0<H>  |  1.65<H>    Ca    9.0      22 Dec 2023 04:51  Phos  3.2     12-21  Mg     2.5     12-22      PT/INR - ( 21 Dec 2023 15:56 )   PT: 11.4 sec;   INR: 1.03 ratio         PTT - ( 22 Dec 2023 08:38 )  PTT:>200.0 sec  Urinalysis Basic - ( 22 Dec 2023 04:51 )    Color: x / Appearance: x / SG: x / pH: x  Gluc: 409 mg/dL / Ketone: x  / Bili: x / Urobili: x   Blood: x / Protein: x / Nitrite: x   Leuk Esterase: x / RBC: x / WBC x   Sq Epi: x / Non Sq Epi: x / Bacteria: x        CAPILLARY BLOOD GLUCOSE      POCT Blood Glucose.: 403 mg/dL (22 Dec 2023 08:57)  POCT Blood Glucose.: 383 mg/dL (22 Dec 2023 05:41)  POCT Blood Glucose.: 317 mg/dL (22 Dec 2023 00:11)  POCT Blood Glucose.: 229 mg/dL (21 Dec 2023 22:08)  POCT Blood Glucose.: 310 mg/dL (21 Dec 2023 19:22)  POCT Blood Glucose.: 271 mg/dL (21 Dec 2023 17:32)  POCT Blood Glucose.: 283 mg/dL (21 Dec 2023 11:54)        RADIOLOGY & ADDITIONAL TESTS:       seen for AFib, RSV    feels ok  tired  no chest pain  afib yesterday, maintained  visual symptoms resolved, negative CT head  headache persists  ros negative     MEDICATIONS  (STANDING):  atorvastatin 80 milliGRAM(s) Oral at bedtime  budesonide  80 MICROgram(s)/formoterol 4.5 MICROgram(s) Inhaler 2 Puff(s) Inhalation two times a day  dextrose 5%. 1000 milliLiter(s) (50 mL/Hr) IV Continuous <Continuous>  dextrose 5%. 1000 milliLiter(s) (100 mL/Hr) IV Continuous <Continuous>  dextrose 50% Injectable 12.5 Gram(s) IV Push once  dextrose 50% Injectable 25 Gram(s) IV Push once  dextrose 50% Injectable 25 Gram(s) IV Push once  diltiazem Infusion 5 mG/Hr (5 mL/Hr) IV Continuous <Continuous>  famotidine    Tablet 20 milliGRAM(s) Oral daily  furosemide   Injectable 40 milliGRAM(s) IV Push daily  glucagon  Injectable 1 milliGRAM(s) IntraMuscular once  heparin  Infusion.  Unit(s)/Hr (24 mL/Hr) IV Continuous <Continuous>  insulin glargine Injectable (LANTUS) 45 Unit(s) SubCutaneous every morning  insulin glargine Injectable (LANTUS) 45 Unit(s) SubCutaneous at bedtime  insulin lispro (ADMELOG) corrective regimen sliding scale   SubCutaneous three times a day before meals  insulin lispro Injectable (ADMELOG) 35 Unit(s) SubCutaneous three times a day before meals  lactulose Syrup 20 Gram(s) Oral daily  levalbuterol Inhalation 0.63 milliGRAM(s) Inhalation every 6 hours  methylPREDNISolone sodium succinate Injectable 40 milliGRAM(s) IV Push every 8 hours  metoprolol tartrate 25 milliGRAM(s) Oral every 8 hours  oxyCODONE  ER Tablet 60 milliGRAM(s) Oral every 12 hours  piperacillin/tazobactam IVPB.- 3.375 Gram(s) IV Intermittent once  piperacillin/tazobactam IVPB.. 3.375 Gram(s) IV Intermittent every 8 hours  polyethylene glycol 3350 17 Gram(s) Oral daily  senna 2 Tablet(s) Oral at bedtime  tiotropium 2.5 MICROgram(s) Inhaler 2 Puff(s) Inhalation daily    MEDICATIONS  (PRN):  acetaminophen     Tablet .. 650 milliGRAM(s) Oral every 6 hours PRN Temp greater or equal to 38C (100.4F), Mild Pain (1 - 3)  albuterol    90 MICROgram(s) HFA Inhaler 2 Puff(s) Inhalation every 2 hours PRN Shortness of Breath and/or Wheezing  ALPRAZolam 0.25 milliGRAM(s) Oral every 8 hours PRN for anxiety/Sleep  dextrose Oral Gel 15 Gram(s) Oral once PRN Blood Glucose LESS THAN 70 milliGRAM(s)/deciliter  diphenhydrAMINE 25 milliGRAM(s) Oral every 6 hours PRN Rash and/or Itching  heparin   Injectable 82700 Unit(s) IV Push every 6 hours PRN For aPTT less than 40  heparin   Injectable 5000 Unit(s) IV Push every 6 hours PRN For aPTT between 40 - 57  hydrALAZINE Injectable 10 milliGRAM(s) IV Push every 4 hours PRN for systolic bp > 160  HYDROmorphone  Injectable 1 milliGRAM(s) IV Push every 4 hours PRN breakthrough  pain  HYDROmorphone  Injectable 0.5 milliGRAM(s) IV Push every 6 hours PRN Severe Pain (7 - 10)  melatonin 3 milliGRAM(s) Oral at bedtime PRN Insomnia  metoprolol tartrate Injectable 5 milliGRAM(s) IV Push every 6 hours PRN heart rate sustaining greater than 130  ondansetron Injectable 4 milliGRAM(s) IV Push every 8 hours PRN Nausea and/or Vomiting  oxyCODONE    IR 5 milliGRAM(s) Oral every 6 hours PRN Moderate Pain (4 - 6)      Allergies    wool- rash, itch (Other)  adhesives (Rash)  latex (Rash)  Bactrim (Flushing)        Vital Signs Last 24 Hrs  T(C): 37.1 (22 Dec 2023 04:21), Max: 37.1 (21 Dec 2023 20:00)  T(F): 98.8 (22 Dec 2023 04:21), Max: 98.8 (21 Dec 2023 20:00)  HR: 142 (22 Dec 2023 10:25) (77 - 142)  BP: 105/67 (22 Dec 2023 10:25) (102/69 - 153/65)  BP(mean): 73 (22 Dec 2023 10:25) (66 - 105)  RR: 18 (22 Dec 2023 10:25) (17 - 19)  SpO2: 92% (22 Dec 2023 10:25) (89% - 100%)    Parameters below as of 22 Dec 2023 10:25  Patient On (Oxygen Delivery Method): nasal cannula, high flow  O2 Flow (L/min): 40  O2 Concentration (%): 45    PHYSICAL EXAM:    GENERAL: NAD  CHEST/LUNG: diffuse wheezing   HEART: irreg rate and rhythm; S1 S2  ABDOMEN: Soft, Nondistended; Bowel sounds present  EXTREMITIES:  lymphedema  gu de la torre   NERVOUS SYSTEM:  Alert & Oriented X3, nonfocal    LABS:                        15.3   18.26 )-----------( 276      ( 22 Dec 2023 04:51 )             45.5     12-22    138  |  93<L>  |  58.9<H>  ----------------------------<  409<H>  4.7   |  35.0<H>  |  1.65<H>    Ca    9.0      22 Dec 2023 04:51  Phos  3.2     12-21  Mg     2.5     12-22      PT/INR - ( 21 Dec 2023 15:56 )   PT: 11.4 sec;   INR: 1.03 ratio         PTT - ( 22 Dec 2023 08:38 )  PTT:>200.0 sec  Urinalysis Basic - ( 22 Dec 2023 04:51 )    Color: x / Appearance: x / SG: x / pH: x  Gluc: 409 mg/dL / Ketone: x  / Bili: x / Urobili: x   Blood: x / Protein: x / Nitrite: x   Leuk Esterase: x / RBC: x / WBC x   Sq Epi: x / Non Sq Epi: x / Bacteria: x        CAPILLARY BLOOD GLUCOSE      POCT Blood Glucose.: 403 mg/dL (22 Dec 2023 08:57)  POCT Blood Glucose.: 383 mg/dL (22 Dec 2023 05:41)  POCT Blood Glucose.: 317 mg/dL (22 Dec 2023 00:11)  POCT Blood Glucose.: 229 mg/dL (21 Dec 2023 22:08)  POCT Blood Glucose.: 310 mg/dL (21 Dec 2023 19:22)  POCT Blood Glucose.: 271 mg/dL (21 Dec 2023 17:32)  POCT Blood Glucose.: 283 mg/dL (21 Dec 2023 11:54)        RADIOLOGY & ADDITIONAL TESTS:

## 2023-12-23 LAB
ANION GAP SERPL CALC-SCNC: 12 MMOL/L — SIGNIFICANT CHANGE UP (ref 5–17)
ANION GAP SERPL CALC-SCNC: 12 MMOL/L — SIGNIFICANT CHANGE UP (ref 5–17)
APTT BLD: 103.9 SEC — HIGH (ref 24.5–35.6)
APTT BLD: 103.9 SEC — HIGH (ref 24.5–35.6)
APTT BLD: 66.3 SEC — HIGH (ref 24.5–35.6)
APTT BLD: 66.3 SEC — HIGH (ref 24.5–35.6)
APTT BLD: 94.9 SEC — HIGH (ref 24.5–35.6)
APTT BLD: 94.9 SEC — HIGH (ref 24.5–35.6)
BUN SERPL-MCNC: 62.4 MG/DL — HIGH (ref 8–20)
BUN SERPL-MCNC: 62.4 MG/DL — HIGH (ref 8–20)
CALCIUM SERPL-MCNC: 9 MG/DL — SIGNIFICANT CHANGE UP (ref 8.4–10.5)
CALCIUM SERPL-MCNC: 9 MG/DL — SIGNIFICANT CHANGE UP (ref 8.4–10.5)
CHLORIDE SERPL-SCNC: 93 MMOL/L — LOW (ref 96–108)
CHLORIDE SERPL-SCNC: 93 MMOL/L — LOW (ref 96–108)
CO2 SERPL-SCNC: 33 MMOL/L — HIGH (ref 22–29)
CO2 SERPL-SCNC: 33 MMOL/L — HIGH (ref 22–29)
CREAT SERPL-MCNC: 1.65 MG/DL — HIGH (ref 0.5–1.3)
CREAT SERPL-MCNC: 1.65 MG/DL — HIGH (ref 0.5–1.3)
EGFR: 33 ML/MIN/1.73M2 — LOW
EGFR: 33 ML/MIN/1.73M2 — LOW
GLUCOSE BLDC GLUCOMTR-MCNC: 196 MG/DL — HIGH (ref 70–99)
GLUCOSE BLDC GLUCOMTR-MCNC: 196 MG/DL — HIGH (ref 70–99)
GLUCOSE BLDC GLUCOMTR-MCNC: 225 MG/DL — HIGH (ref 70–99)
GLUCOSE BLDC GLUCOMTR-MCNC: 225 MG/DL — HIGH (ref 70–99)
GLUCOSE BLDC GLUCOMTR-MCNC: 227 MG/DL — HIGH (ref 70–99)
GLUCOSE BLDC GLUCOMTR-MCNC: 227 MG/DL — HIGH (ref 70–99)
GLUCOSE BLDC GLUCOMTR-MCNC: 291 MG/DL — HIGH (ref 70–99)
GLUCOSE BLDC GLUCOMTR-MCNC: 291 MG/DL — HIGH (ref 70–99)
GLUCOSE SERPL-MCNC: 231 MG/DL — HIGH (ref 70–99)
GLUCOSE SERPL-MCNC: 231 MG/DL — HIGH (ref 70–99)
HCT VFR BLD CALC: 45.9 % — HIGH (ref 34.5–45)
HCT VFR BLD CALC: 45.9 % — HIGH (ref 34.5–45)
HGB BLD-MCNC: 15.3 G/DL — SIGNIFICANT CHANGE UP (ref 11.5–15.5)
HGB BLD-MCNC: 15.3 G/DL — SIGNIFICANT CHANGE UP (ref 11.5–15.5)
MAGNESIUM SERPL-MCNC: 2.5 MG/DL — SIGNIFICANT CHANGE UP (ref 1.6–2.6)
MAGNESIUM SERPL-MCNC: 2.5 MG/DL — SIGNIFICANT CHANGE UP (ref 1.6–2.6)
MCHC RBC-ENTMCNC: 29.9 PG — SIGNIFICANT CHANGE UP (ref 27–34)
MCHC RBC-ENTMCNC: 29.9 PG — SIGNIFICANT CHANGE UP (ref 27–34)
MCHC RBC-ENTMCNC: 33.3 GM/DL — SIGNIFICANT CHANGE UP (ref 32–36)
MCHC RBC-ENTMCNC: 33.3 GM/DL — SIGNIFICANT CHANGE UP (ref 32–36)
MCV RBC AUTO: 89.8 FL — SIGNIFICANT CHANGE UP (ref 80–100)
MCV RBC AUTO: 89.8 FL — SIGNIFICANT CHANGE UP (ref 80–100)
PHOSPHATE SERPL-MCNC: 3 MG/DL — SIGNIFICANT CHANGE UP (ref 2.4–4.7)
PHOSPHATE SERPL-MCNC: 3 MG/DL — SIGNIFICANT CHANGE UP (ref 2.4–4.7)
PLATELET # BLD AUTO: 263 K/UL — SIGNIFICANT CHANGE UP (ref 150–400)
PLATELET # BLD AUTO: 263 K/UL — SIGNIFICANT CHANGE UP (ref 150–400)
POTASSIUM SERPL-MCNC: 4.4 MMOL/L — SIGNIFICANT CHANGE UP (ref 3.5–5.3)
POTASSIUM SERPL-MCNC: 4.4 MMOL/L — SIGNIFICANT CHANGE UP (ref 3.5–5.3)
POTASSIUM SERPL-SCNC: 4.4 MMOL/L — SIGNIFICANT CHANGE UP (ref 3.5–5.3)
POTASSIUM SERPL-SCNC: 4.4 MMOL/L — SIGNIFICANT CHANGE UP (ref 3.5–5.3)
RBC # BLD: 5.11 M/UL — SIGNIFICANT CHANGE UP (ref 3.8–5.2)
RBC # BLD: 5.11 M/UL — SIGNIFICANT CHANGE UP (ref 3.8–5.2)
RBC # FLD: 13.2 % — SIGNIFICANT CHANGE UP (ref 10.3–14.5)
RBC # FLD: 13.2 % — SIGNIFICANT CHANGE UP (ref 10.3–14.5)
SODIUM SERPL-SCNC: 137 MMOL/L — SIGNIFICANT CHANGE UP (ref 135–145)
SODIUM SERPL-SCNC: 137 MMOL/L — SIGNIFICANT CHANGE UP (ref 135–145)
WBC # BLD: 22.76 K/UL — HIGH (ref 3.8–10.5)
WBC # BLD: 22.76 K/UL — HIGH (ref 3.8–10.5)
WBC # FLD AUTO: 22.76 K/UL — HIGH (ref 3.8–10.5)
WBC # FLD AUTO: 22.76 K/UL — HIGH (ref 3.8–10.5)

## 2023-12-23 PROCEDURE — 99234 HOSP IP/OBS SM DT SF/LOW 45: CPT

## 2023-12-23 PROCEDURE — 99233 SBSQ HOSP IP/OBS HIGH 50: CPT

## 2023-12-23 RX ORDER — NYSTATIN CREAM 100000 [USP'U]/G
1 CREAM TOPICAL
Refills: 0 | Status: DISCONTINUED | OUTPATIENT
Start: 2023-12-23 | End: 2024-01-08

## 2023-12-23 RX ORDER — PIPERACILLIN AND TAZOBACTAM 4; .5 G/20ML; G/20ML
3.38 INJECTION, POWDER, LYOPHILIZED, FOR SOLUTION INTRAVENOUS EVERY 8 HOURS
Refills: 0 | Status: DISCONTINUED | OUTPATIENT
Start: 2023-12-23 | End: 2023-12-26

## 2023-12-23 RX ADMIN — Medication 25 MILLIGRAM(S): at 14:03

## 2023-12-23 RX ADMIN — OXYCODONE HYDROCHLORIDE 60 MILLIGRAM(S): 5 TABLET ORAL at 06:05

## 2023-12-23 RX ADMIN — FAMOTIDINE 20 MILLIGRAM(S): 10 INJECTION INTRAVENOUS at 14:01

## 2023-12-23 RX ADMIN — ONDANSETRON 4 MILLIGRAM(S): 8 TABLET, FILM COATED ORAL at 09:23

## 2023-12-23 RX ADMIN — PIPERACILLIN AND TAZOBACTAM 25 GRAM(S): 4; .5 INJECTION, POWDER, LYOPHILIZED, FOR SOLUTION INTRAVENOUS at 22:08

## 2023-12-23 RX ADMIN — Medication 35 UNIT(S): at 18:31

## 2023-12-23 RX ADMIN — PIPERACILLIN AND TAZOBACTAM 25 GRAM(S): 4; .5 INJECTION, POWDER, LYOPHILIZED, FOR SOLUTION INTRAVENOUS at 06:03

## 2023-12-23 RX ADMIN — ONDANSETRON 4 MILLIGRAM(S): 8 TABLET, FILM COATED ORAL at 00:13

## 2023-12-23 RX ADMIN — Medication 4: at 18:31

## 2023-12-23 RX ADMIN — INSULIN GLARGINE 45 UNIT(S): 100 INJECTION, SOLUTION SUBCUTANEOUS at 22:07

## 2023-12-23 RX ADMIN — SENNA PLUS 2 TABLET(S): 8.6 TABLET ORAL at 22:07

## 2023-12-23 RX ADMIN — HEPARIN SODIUM 1200 UNIT(S)/HR: 5000 INJECTION INTRAVENOUS; SUBCUTANEOUS at 06:11

## 2023-12-23 RX ADMIN — LEVALBUTEROL 0.63 MILLIGRAM(S): 1.25 SOLUTION, CONCENTRATE RESPIRATORY (INHALATION) at 12:19

## 2023-12-23 RX ADMIN — HEPARIN SODIUM 900 UNIT(S)/HR: 5000 INJECTION INTRAVENOUS; SUBCUTANEOUS at 20:04

## 2023-12-23 RX ADMIN — Medication 500 MICROGRAM(S): at 21:42

## 2023-12-23 RX ADMIN — Medication 40 MILLIGRAM(S): at 06:07

## 2023-12-23 RX ADMIN — Medication 1 APPLICATION(S): at 17:17

## 2023-12-23 RX ADMIN — Medication 35 UNIT(S): at 09:16

## 2023-12-23 RX ADMIN — HEPARIN SODIUM 900 UNIT(S)/HR: 5000 INJECTION INTRAVENOUS; SUBCUTANEOUS at 14:17

## 2023-12-23 RX ADMIN — Medication 5 MILLIGRAM(S): at 22:06

## 2023-12-23 RX ADMIN — NYSTATIN CREAM 1 APPLICATION(S): 100000 CREAM TOPICAL at 17:17

## 2023-12-23 RX ADMIN — HEPARIN SODIUM 900 UNIT(S)/HR: 5000 INJECTION INTRAVENOUS; SUBCUTANEOUS at 19:40

## 2023-12-23 RX ADMIN — HEPARIN SODIUM 1200 UNIT(S)/HR: 5000 INJECTION INTRAVENOUS; SUBCUTANEOUS at 07:37

## 2023-12-23 RX ADMIN — BUDESONIDE AND FORMOTEROL FUMARATE DIHYDRATE 2 PUFF(S): 160; 4.5 AEROSOL RESPIRATORY (INHALATION) at 21:42

## 2023-12-23 RX ADMIN — OXYCODONE HYDROCHLORIDE 60 MILLIGRAM(S): 5 TABLET ORAL at 17:18

## 2023-12-23 RX ADMIN — ONDANSETRON 4 MILLIGRAM(S): 8 TABLET, FILM COATED ORAL at 22:06

## 2023-12-23 RX ADMIN — POLYETHYLENE GLYCOL 3350 17 GRAM(S): 17 POWDER, FOR SOLUTION ORAL at 14:03

## 2023-12-23 RX ADMIN — Medication 40 MILLIGRAM(S): at 14:02

## 2023-12-23 RX ADMIN — LACTULOSE 20 GRAM(S): 10 SOLUTION ORAL at 14:02

## 2023-12-23 RX ADMIN — Medication 500 MICROGRAM(S): at 12:19

## 2023-12-23 RX ADMIN — Medication 40 MILLIGRAM(S): at 22:06

## 2023-12-23 RX ADMIN — Medication 0.25 MILLIGRAM(S): at 09:24

## 2023-12-23 RX ADMIN — Medication 0.25 MILLIGRAM(S): at 00:13

## 2023-12-23 RX ADMIN — Medication 6: at 09:17

## 2023-12-23 RX ADMIN — Medication 25 MILLIGRAM(S): at 06:06

## 2023-12-23 RX ADMIN — ATORVASTATIN CALCIUM 80 MILLIGRAM(S): 80 TABLET, FILM COATED ORAL at 22:08

## 2023-12-23 RX ADMIN — Medication 35 UNIT(S): at 14:02

## 2023-12-23 RX ADMIN — LEVALBUTEROL 0.63 MILLIGRAM(S): 1.25 SOLUTION, CONCENTRATE RESPIRATORY (INHALATION) at 21:42

## 2023-12-23 RX ADMIN — OXYCODONE HYDROCHLORIDE 5 MILLIGRAM(S): 5 TABLET ORAL at 22:06

## 2023-12-23 RX ADMIN — INSULIN GLARGINE 45 UNIT(S): 100 INJECTION, SOLUTION SUBCUTANEOUS at 09:16

## 2023-12-23 RX ADMIN — Medication 0.25 MILLIGRAM(S): at 22:07

## 2023-12-23 RX ADMIN — Medication 40 MILLIGRAM(S): at 06:06

## 2023-12-23 RX ADMIN — Medication 8: at 14:03

## 2023-12-23 RX ADMIN — Medication 25 MILLIGRAM(S): at 22:06

## 2023-12-23 NOTE — PROGRESS NOTE ADULT - SUBJECTIVE AND OBJECTIVE BOX
[facility]                                                         CARDIOLOGY AT [facility]                                                                  [address] [address]                                                         Telephone: [phone]. Fax:[phone]                                                                             PROGRESS NOTE    Reason for follow up: AoC HFpEF, new aflutter  Update: Remains on HiFlow      Review of symptoms:   Cardiac:  No chest pain. No dyspnea. No palpitations.  Respiratory: no cough. No dyspnea  Gastrointestinal: No diarrhea. No abdominal pain. No bleeding.   Neuro: No focal neuro complaints.    Vitals:  T(C): 36.8 (12-23-23 @ 08:27), Max: 37.2 (12-23-23 @ 00:27)  HR: 73 (12-23-23 @ 08:00) (72 - 142)  BP: 103/69 (12-23-23 @ 08:00) (95/55 - 142/55)  RR: 22 (12-23-23 @ 08:00) (18 - 26)  SpO2: 97% (12-23-23 @ 08:00) (91% - 99%)  Wt(kg): --  I&O's Summary    22 Dec 2023 07:01  -  23 Dec 2023 07:00  --------------------------------------------------------  IN: 196 mL / OUT: 3200 mL / NET: -3004 mL          PHYSICAL EXAM:  Appearance: Comfortable. No acute distress  HEENT:  Atraumatic. Normocephalic.  Normal oral mucosa  Neurologic: A & O x 3, no gross focal deficits.  Cardiovascular: irregular S1 S2, No murmur, no rubs/gallops. No JVD  Respiratory: Lungs clear to auscultation, unlabored   Gastrointestinal:  Soft, Non-tender, + BS  Lower Extremities: 2+ Peripheral Pulses, No clubbing, cyanosis, +edema  Psychiatry: Patient is calm. No agitation.   Skin: warm and dry.    CURRENT CARDIAC MEDICATIONS:  furosemide   Injectable 40 milliGRAM(s) IV Push daily  hydrALAZINE Injectable 10 milliGRAM(s) IV Push every 4 hours PRN  metoprolol tartrate 25 milliGRAM(s) Oral every 8 hours  metoprolol tartrate Injectable 5 milliGRAM(s) IV Push every 6 hours PRN      CURRENT OTHER MEDICATIONS:  albuterol    90 MICROgram(s) HFA Inhaler 2 Puff(s) Inhalation every 2 hours PRN Shortness of Breath and/or Wheezing  budesonide  80 MICROgram(s)/formoterol 4.5 MICROgram(s) Inhaler 2 Puff(s) Inhalation two times a day  ipratropium    for Nebulization 500 MICROGram(s) Nebulizer every 6 hours  levalbuterol Inhalation 0.63 milliGRAM(s) Inhalation every 6 hours  piperacillin/tazobactam IVPB.- 3.375 Gram(s) IV Intermittent once  piperacillin/tazobactam IVPB.. 3.375 Gram(s) IV Intermittent every 8 hours  acetaminophen     Tablet .. 650 milliGRAM(s) Oral every 6 hours PRN Temp greater or equal to 38C (100.4F), Mild Pain (1 - 3)  ALPRAZolam 0.25 milliGRAM(s) Oral every 8 hours PRN for anxiety/Sleep  diphenhydrAMINE 25 milliGRAM(s) Oral every 6 hours PRN Rash and/or Itching  HYDROmorphone  Injectable 0.5 milliGRAM(s) IV Push every 6 hours PRN Severe Pain (7 - 10)  HYDROmorphone  Injectable 1 milliGRAM(s) IV Push every 4 hours PRN breakthrough  pain  melatonin 3 milliGRAM(s) Oral at bedtime PRN Insomnia  ondansetron Injectable 4 milliGRAM(s) IV Push every 8 hours PRN Nausea and/or Vomiting  oxyCODONE    IR 5 milliGRAM(s) Oral every 6 hours PRN Moderate Pain (4 - 6)  oxyCODONE  ER Tablet 60 milliGRAM(s) Oral every 12 hours  famotidine    Tablet 20 milliGRAM(s) Oral daily  lactulose Syrup 20 Gram(s) Oral daily  polyethylene glycol 3350 17 Gram(s) Oral daily  senna 2 Tablet(s) Oral at bedtime  atorvastatin 80 milliGRAM(s) Oral at bedtime  dextrose 50% Injectable 12.5 Gram(s) IV Push once, Stop order after: 1 Doses  dextrose 50% Injectable 25 Gram(s) IV Push once, Stop order after: 1 Doses  dextrose 50% Injectable 25 Gram(s) IV Push once, Stop order after: 1 Doses  dextrose Oral Gel 15 Gram(s) Oral once, Stop order after: 1 Doses PRN Blood Glucose LESS THAN 70 milliGRAM(s)/deciliter  glucagon  Injectable 1 milliGRAM(s) IntraMuscular once, Stop order after: 1 Doses  insulin glargine Injectable (LANTUS) 45 Unit(s) SubCutaneous every morning  insulin glargine Injectable (LANTUS) 45 Unit(s) SubCutaneous at bedtime  insulin lispro (ADMELOG) corrective regimen sliding scale   SubCutaneous three times a day before meals  insulin lispro Injectable (ADMELOG) 35 Unit(s) SubCutaneous three times a day before meals  methylPREDNISolone sodium succinate Injectable 40 milliGRAM(s) IV Push every 8 hours  dextrose 5%. 1000 milliLiter(s) (100 mL/Hr) IV Continuous <Continuous>  dextrose 5%. 1000 milliLiter(s) (50 mL/Hr) IV Continuous <Continuous>  heparin   Injectable 5000 Unit(s) IV Push every 6 hours PRN For aPTT between 40 - 57  heparin   Injectable 09654 Unit(s) IV Push every 6 hours PRN For aPTT less than 40  heparin  Infusion.  Unit(s)/Hr (24 mL/Hr) IV Continuous <Continuous>      LABS:	 	                            15.3   22.76 )-----------( 263      ( 23 Dec 2023 04:35 )             45.9     12-23    137  |  93<L>  |  62.4<H>  ----------------------------<  231<H>  4.4   |  33.0<H>  |  1.65<H>    Ca    9.0      23 Dec 2023 04:35  Phos  3.0     12-23  Mg     2.5     12-23      PT/INR/PTT ( 23 Dec 2023 04:35 )                       :                       :      X            :       94.9                  .        .                   .              .           .       X           .                                       Lipid Profile: Date: 12-17 @ 08:20  Total cholesterol 140; Direct LDL: --; HDL: 70; Triglycerides:133    HgA1c:   TSH: Thyroid Stimulating Hormone, Serum: 0.34 uIU/mL  Thyroid Stimulating Hormone, Serum: 1.51 uIU/mL  Thyroid Stimulating Hormone, Serum: 3.02 uIU/mL      TELEMETRY: aflutter      DIAGNOSTIC TESTING:  [ ] Echocardiogram:   < from: TTE W or WO Ultrasound Enhancing Agent (12.18.23 @ 13:36) >  CONCLUSIONS:      1. Technically difficult image quality.   2. Normal biventricular systolic function.   3. Compared to the transthoracic echocardiogram performed on 8/31/2023.    < end of copied text >                                                                    [facility]                                                         CARDIOLOGY AT [facility]                                                                  [address] [address]                                                         Telephone: [phone]. Fax:[phone]                                                                             PROGRESS NOTE    Reason for follow up: AoC HFpEF, new aflutter  Update: Remains on HiFlow      Review of symptoms:   Cardiac:  No chest pain. No dyspnea. No palpitations.  Respiratory: no cough. No dyspnea  Gastrointestinal: No diarrhea. No abdominal pain. No bleeding.   Neuro: No focal neuro complaints.    Vitals:  T(C): 36.8 (12-23-23 @ 08:27), Max: 37.2 (12-23-23 @ 00:27)  HR: 73 (12-23-23 @ 08:00) (72 - 142)  BP: 103/69 (12-23-23 @ 08:00) (95/55 - 142/55)  RR: 22 (12-23-23 @ 08:00) (18 - 26)  SpO2: 97% (12-23-23 @ 08:00) (91% - 99%)  Wt(kg): --  I&O's Summary    22 Dec 2023 07:01  -  23 Dec 2023 07:00  --------------------------------------------------------  IN: 196 mL / OUT: 3200 mL / NET: -3004 mL          PHYSICAL EXAM:  Appearance: Comfortable. No acute distress  HEENT:  Atraumatic. Normocephalic.  Normal oral mucosa  Neurologic: A & O x 3, no gross focal deficits.  Cardiovascular: irregular S1 S2, No murmur, no rubs/gallops. No JVD  Respiratory: Lungs clear to auscultation, unlabored   Gastrointestinal:  Soft, Non-tender, + BS  Lower Extremities: 2+ Peripheral Pulses, No clubbing, cyanosis, +edema  Psychiatry: Patient is calm. No agitation.   Skin: warm and dry.    CURRENT CARDIAC MEDICATIONS:  furosemide   Injectable 40 milliGRAM(s) IV Push daily  hydrALAZINE Injectable 10 milliGRAM(s) IV Push every 4 hours PRN  metoprolol tartrate 25 milliGRAM(s) Oral every 8 hours  metoprolol tartrate Injectable 5 milliGRAM(s) IV Push every 6 hours PRN      CURRENT OTHER MEDICATIONS:  albuterol    90 MICROgram(s) HFA Inhaler 2 Puff(s) Inhalation every 2 hours PRN Shortness of Breath and/or Wheezing  budesonide  80 MICROgram(s)/formoterol 4.5 MICROgram(s) Inhaler 2 Puff(s) Inhalation two times a day  ipratropium    for Nebulization 500 MICROGram(s) Nebulizer every 6 hours  levalbuterol Inhalation 0.63 milliGRAM(s) Inhalation every 6 hours  piperacillin/tazobactam IVPB.- 3.375 Gram(s) IV Intermittent once  piperacillin/tazobactam IVPB.. 3.375 Gram(s) IV Intermittent every 8 hours  acetaminophen     Tablet .. 650 milliGRAM(s) Oral every 6 hours PRN Temp greater or equal to 38C (100.4F), Mild Pain (1 - 3)  ALPRAZolam 0.25 milliGRAM(s) Oral every 8 hours PRN for anxiety/Sleep  diphenhydrAMINE 25 milliGRAM(s) Oral every 6 hours PRN Rash and/or Itching  HYDROmorphone  Injectable 0.5 milliGRAM(s) IV Push every 6 hours PRN Severe Pain (7 - 10)  HYDROmorphone  Injectable 1 milliGRAM(s) IV Push every 4 hours PRN breakthrough  pain  melatonin 3 milliGRAM(s) Oral at bedtime PRN Insomnia  ondansetron Injectable 4 milliGRAM(s) IV Push every 8 hours PRN Nausea and/or Vomiting  oxyCODONE    IR 5 milliGRAM(s) Oral every 6 hours PRN Moderate Pain (4 - 6)  oxyCODONE  ER Tablet 60 milliGRAM(s) Oral every 12 hours  famotidine    Tablet 20 milliGRAM(s) Oral daily  lactulose Syrup 20 Gram(s) Oral daily  polyethylene glycol 3350 17 Gram(s) Oral daily  senna 2 Tablet(s) Oral at bedtime  atorvastatin 80 milliGRAM(s) Oral at bedtime  dextrose 50% Injectable 12.5 Gram(s) IV Push once, Stop order after: 1 Doses  dextrose 50% Injectable 25 Gram(s) IV Push once, Stop order after: 1 Doses  dextrose 50% Injectable 25 Gram(s) IV Push once, Stop order after: 1 Doses  dextrose Oral Gel 15 Gram(s) Oral once, Stop order after: 1 Doses PRN Blood Glucose LESS THAN 70 milliGRAM(s)/deciliter  glucagon  Injectable 1 milliGRAM(s) IntraMuscular once, Stop order after: 1 Doses  insulin glargine Injectable (LANTUS) 45 Unit(s) SubCutaneous every morning  insulin glargine Injectable (LANTUS) 45 Unit(s) SubCutaneous at bedtime  insulin lispro (ADMELOG) corrective regimen sliding scale   SubCutaneous three times a day before meals  insulin lispro Injectable (ADMELOG) 35 Unit(s) SubCutaneous three times a day before meals  methylPREDNISolone sodium succinate Injectable 40 milliGRAM(s) IV Push every 8 hours  dextrose 5%. 1000 milliLiter(s) (100 mL/Hr) IV Continuous <Continuous>  dextrose 5%. 1000 milliLiter(s) (50 mL/Hr) IV Continuous <Continuous>  heparin   Injectable 5000 Unit(s) IV Push every 6 hours PRN For aPTT between 40 - 57  heparin   Injectable 82346 Unit(s) IV Push every 6 hours PRN For aPTT less than 40  heparin  Infusion.  Unit(s)/Hr (24 mL/Hr) IV Continuous <Continuous>      LABS:	 	                            15.3   22.76 )-----------( 263      ( 23 Dec 2023 04:35 )             45.9     12-23    137  |  93<L>  |  62.4<H>  ----------------------------<  231<H>  4.4   |  33.0<H>  |  1.65<H>    Ca    9.0      23 Dec 2023 04:35  Phos  3.0     12-23  Mg     2.5     12-23      PT/INR/PTT ( 23 Dec 2023 04:35 )                       :                       :      X            :       94.9                  .        .                   .              .           .       X           .                                       Lipid Profile: Date: 12-17 @ 08:20  Total cholesterol 140; Direct LDL: --; HDL: 70; Triglycerides:133    HgA1c:   TSH: Thyroid Stimulating Hormone, Serum: 0.34 uIU/mL  Thyroid Stimulating Hormone, Serum: 1.51 uIU/mL  Thyroid Stimulating Hormone, Serum: 3.02 uIU/mL      TELEMETRY: aflutter      DIAGNOSTIC TESTING:  [ ] Echocardiogram:   < from: TTE W or WO Ultrasound Enhancing Agent (12.18.23 @ 13:36) >  CONCLUSIONS:      1. Technically difficult image quality.   2. Normal biventricular systolic function.   3. Compared to the transthoracic echocardiogram performed on 8/31/2023.    < end of copied text >

## 2023-12-23 NOTE — PROGRESS NOTE ADULT - NS ATTEND AMEND GEN_ALL_CORE FT
seen with above,    68F super morbid obesity (BMI >60), ENRIQUE/OHS, chronic respiratory failure, severe pulmonary HTN with RV dysfunction, CKD 3, HFpEF, recent admission 9/2023 with hypoxic respiratory failure unable to get CTPA or VQ scan and was empirically treated with Eliquis now readmitted for acute on chronic hypoxemic respiratory failure on treatment with IV diuresis and empiric Zosyn with Solumedrol, +RSV as well    -was on nasal BiPAP and been on HFNC x4 days with sat 90%, gradual improvement in respiratory status, repeat weight <400 lbs  -diuresing well on IV Lasix 40mg once daily, Cr. stable, continue to aim for net negative output been diuresing 2-3 liters daily (prior admission Cr. peak at 2.1), , will continue optimize to reattempt next week when she will be able to come off HFNC  -may need addition of acetazolamide if further uptrend in bicarb  -been in persistent Afib RVR up to 130-140s refractory to PO/IV metoprolol, given IV 0.5mg digoxin and started on dilitazem gtt @5mg/hr, HR controlled today will continue metoprolol alone without diltiazem gtt, continue to uptitrate for goal resting HR 110s as long as BP tolerates  -prior SBP 90s, off amlodipine for now, may consider spironolactone or Entresto after cath if Cr. stable  -ABG with chronic CO2 retention, she is awake/alert and conversive, continue nocturnal BiPAP and pulmonary follow up.   -discontinued empiric Eliquis, normal D-dimer, but now on heparin gtt for pAfib and continue until after R/LHC  -need weight loss         Lenard Van DO, [facility]  Faculty Non-Invasive Cardiologist  [phone] seen with above,    68F super morbid obesity (BMI >60), ENRIQUE/OHS, chronic respiratory failure, severe pulmonary HTN with RV dysfunction, CKD 3, HFpEF, recent admission 9/2023 with hypoxic respiratory failure unable to get CTPA or VQ scan and was empirically treated with Eliquis now readmitted for acute on chronic hypoxemic respiratory failure on treatment with IV diuresis and empiric Zosyn with Solumedrol, +RSV as well    -was on nasal BiPAP and been on HFNC x4 days with sat 90%, gradual improvement in respiratory status, repeat weight <400 lbs  -diuresing well on IV Lasix 40mg once daily, Cr. stable, continue to aim for net negative output been diuresing 2-3 liters daily (prior admission Cr. peak at 2.1), , will continue optimize to reattempt next week when she will be able to come off HFNC  -may need addition of acetazolamide if further uptrend in bicarb  -been in persistent Afib RVR up to 130-140s refractory to PO/IV metoprolol, given IV 0.5mg digoxin and started on dilitazem gtt @5mg/hr, HR controlled today will continue metoprolol alone without diltiazem gtt, continue to uptitrate for goal resting HR 110s as long as BP tolerates  -prior SBP 90s, off amlodipine for now, may consider spironolactone or Entresto after cath if Cr. stable  -ABG with chronic CO2 retention, she is awake/alert and conversive, continue nocturnal BiPAP and pulmonary follow up.   -discontinued empiric Eliquis, normal D-dimer, but now on heparin gtt for pAfib and continue until after R/LHC  -need weight loss         Lenard Van DO, [facility]  Faculty Non-Invasive Cardiologist  [phone]

## 2023-12-23 NOTE — PROGRESS NOTE ADULT - SUBJECTIVE AND OBJECTIVE BOX
PULMONARY PROGRESS NOTE      [id number]    Patient is a 68y old  Female who presents with a chief complaint of SOB (23 Dec 2023 13:40)      INTERVAL HPI/OVERNIGHT EVENTS:  alert, NAD  tolerating NIV at night and HFNC 40/40 daytime sp02 92%  no cp or sputum reported  MEDICATIONS  (STANDING):  atorvastatin 80 milliGRAM(s) Oral at bedtime  budesonide  80 MICROgram(s)/formoterol 4.5 MICROgram(s) Inhaler 2 Puff(s) Inhalation two times a day  clotrimazole 1% Cream 1 Application(s) Topical two times a day  dextrose 5%. 1000 milliLiter(s) (50 mL/Hr) IV Continuous <Continuous>  dextrose 5%. 1000 milliLiter(s) (100 mL/Hr) IV Continuous <Continuous>  dextrose 50% Injectable 25 Gram(s) IV Push once  dextrose 50% Injectable 12.5 Gram(s) IV Push once  dextrose 50% Injectable 25 Gram(s) IV Push once  famotidine    Tablet 20 milliGRAM(s) Oral daily  furosemide   Injectable 40 milliGRAM(s) IV Push daily  glucagon  Injectable 1 milliGRAM(s) IntraMuscular once  heparin  Infusion.  Unit(s)/Hr (24 mL/Hr) IV Continuous <Continuous>  insulin glargine Injectable (LANTUS) 45 Unit(s) SubCutaneous at bedtime  insulin glargine Injectable (LANTUS) 45 Unit(s) SubCutaneous every morning  insulin lispro (ADMELOG) corrective regimen sliding scale   SubCutaneous three times a day before meals  insulin lispro Injectable (ADMELOG) 35 Unit(s) SubCutaneous three times a day before meals  ipratropium    for Nebulization 500 MICROGram(s) Nebulizer every 6 hours  lactulose Syrup 20 Gram(s) Oral daily  levalbuterol Inhalation 0.63 milliGRAM(s) Inhalation every 6 hours  methylPREDNISolone sodium succinate Injectable 40 milliGRAM(s) IV Push every 8 hours  metoprolol tartrate 25 milliGRAM(s) Oral every 8 hours  nystatin Powder 1 Application(s) Topical two times a day  oxyCODONE  ER Tablet 60 milliGRAM(s) Oral every 12 hours  polyethylene glycol 3350 17 Gram(s) Oral daily  senna 2 Tablet(s) Oral at bedtime      MEDICATIONS  (PRN):  acetaminophen     Tablet .. 650 milliGRAM(s) Oral every 6 hours PRN Temp greater or equal to 38C (100.4F), Mild Pain (1 - 3)  albuterol    90 MICROgram(s) HFA Inhaler 2 Puff(s) Inhalation every 2 hours PRN Shortness of Breath and/or Wheezing  ALPRAZolam 0.25 milliGRAM(s) Oral every 8 hours PRN for anxiety/Sleep  dextrose Oral Gel 15 Gram(s) Oral once PRN Blood Glucose LESS THAN 70 milliGRAM(s)/deciliter  diphenhydrAMINE 25 milliGRAM(s) Oral every 6 hours PRN Rash and/or Itching  heparin   Injectable 29841 Unit(s) IV Push every 6 hours PRN For aPTT less than 40  heparin   Injectable 5000 Unit(s) IV Push every 6 hours PRN For aPTT between 40 - 57  hydrALAZINE Injectable 10 milliGRAM(s) IV Push every 4 hours PRN for systolic bp > 160  HYDROmorphone  Injectable 1 milliGRAM(s) IV Push every 4 hours PRN breakthrough  pain  HYDROmorphone  Injectable 0.5 milliGRAM(s) IV Push every 6 hours PRN Severe Pain (7 - 10)  melatonin 3 milliGRAM(s) Oral at bedtime PRN Insomnia  metoprolol tartrate Injectable 5 milliGRAM(s) IV Push every 6 hours PRN heart rate sustaining greater than 130  ondansetron Injectable 4 milliGRAM(s) IV Push every 8 hours PRN Nausea and/or Vomiting  oxyCODONE    IR 5 milliGRAM(s) Oral every 6 hours PRN Moderate Pain (4 - 6)      Allergies    wool- rash, itch (Other)  adhesives (Rash)  latex (Rash)  Bactrim (Flushing)    Intolerances        PAST MEDICAL & SURGICAL HISTORY:  Diabetes Mellitus Type II      HTN (Hypertension)      Endometrial Hyperplasia      Cervical Stenosis of Spine      Spinal Stenosis, Lumbar      Deep Vein Thrombosis (DVT)  Left leg, 2004, treated and resolved      Dyslipidemia      Cataract      Morbid Obesity      Vitamin D deficiency      Insomnia      CKD (chronic kidney disease)  ~ III      Congestive heart failure  ~ HFpEF      Uterine cancer      Asthma      On home oxygen therapy      Gait difficulty  ~ u ses walker      Cataract extracted with lens implant 1998  Right      C Section 1994      Cholecystectomy/appendectomy @ age 25      D&C x2 1980's      D&C 2008  hysteroscopy, endometrial hyperplasia, 2009      Cervical Spinal Stenosis surgery x2 (01/2002, 7/2002)      Tonsillectomy as a child      Endometrial biopsy 12/02/09      H/O laser iridotomy  left eye, 2016      H/O colonoscopy  1998      S/P total abdominal hysterectomy and bilateral salpingo-oophorectomy      S/P appendectomy      S/P cholecystectomy          SOCIAL HISTORY  Smoking History:       REVIEW OF SYSTEMS:    CONSTITUTIONAL:  No distress    HEENT:  Eyes:  No diplopia or blurred vision. ENT:  No earache, sore throat or runny nose.    CARDIOVASCULAR:  No pressure, squeezing, tightness, heaviness or aching about the chest; no palpitations.    RESPIRATORY:  see HPI    GASTROINTESTINAL:  No nausea, vomiting or diarrhea.    GENITOURINARY:  No dysuria, frequency or urgency.    NEUROLOGIC:  No paresthesias, fasciculations, seizures or weakness.    PSYCHIATRIC:  No disorder of thought or mood.    Vital Signs Last 24 Hrs  T(C): 37 (23 Dec 2023 10:00), Max: 37.2 (23 Dec 2023 00:27)  T(F): 98.6 (23 Dec 2023 10:00), Max: 99 (23 Dec 2023 00:27)  HR: 102 (23 Dec 2023 10:00) (72 - 122)  BP: 111/86 (23 Dec 2023 10:00) (103/69 - 142/55)  BP(mean): 91 (23 Dec 2023 10:00) (65 - 93)  RR: 20 (23 Dec 2023 10:00) (19 - 26)  SpO2: 94% (23 Dec 2023 10:00) (93% - 99%)    Parameters below as of 23 Dec 2023 10:00  Patient On (Oxygen Delivery Method): nasal cannula, high flow  O2 Flow (L/min): 40  O2 Concentration (%): 30    PHYSICAL EXAMINATION:    GENERAL: The patient is awake and alert in no apparent distress.     HEENT: Head is normocephalic and atraumatic. Extraocular muscles are intact. Mucous membranes are moist.    NECK: Supple.    LUNGS: moderate air entry bilat  with exp rhonchi; respirations unlabored    HEART: Regular rate and rhythm without murmur.    ABDOMEN: Soft, nontender, and nondistended.      EXTREMITIES: With edema.    NEUROLOGIC: Grossly intact.    LABS:                        15.3   22.76 )-----------( 263      ( 23 Dec 2023 04:35 )             45.9     12-23    137  |  93<L>  |  62.4<H>  ----------------------------<  231<H>  4.4   |  33.0<H>  |  1.65<H>    Ca    9.0      23 Dec 2023 04:35  Phos  3.0     12-23  Mg     2.5     12-23      PT/INR - ( 21 Dec 2023 15:56 )   PT: 11.4 sec;   INR: 1.03 ratio         PTT - ( 23 Dec 2023 13:11 )  PTT:103.9 sec  Urinalysis Basic - ( 23 Dec 2023 04:35 )    Color: x / Appearance: x / SG: x / pH: x  Gluc: 231 mg/dL / Ketone: x  / Bili: x / Urobili: x   Blood: x / Protein: x / Nitrite: x   Leuk Esterase: x / RBC: x / WBC x   Sq Epi: x / Non Sq Epi: x / Bacteria: x      ABG - ( 22 Dec 2023 06:23 )  pH, Arterial: 7.490 pH, Blood: x     /  pCO2: 52    /  pO2: 94    / HCO3: 40    / Base Excess: 16.3  /  SaO2: 98.3                        Procalcitonin, Serum: 0.11 ng/mL (12-22-23 @ 04:51)      MICROBIOLOGY:    RADIOLOGY & ADDITIONAL STUDIES: PULMONARY PROGRESS NOTE      [id number]    Patient is a 68y old  Female who presents with a chief complaint of SOB (23 Dec 2023 13:40)      INTERVAL HPI/OVERNIGHT EVENTS:  alert, NAD  tolerating NIV at night and HFNC 40/40 daytime sp02 92%  no cp or sputum reported  MEDICATIONS  (STANDING):  atorvastatin 80 milliGRAM(s) Oral at bedtime  budesonide  80 MICROgram(s)/formoterol 4.5 MICROgram(s) Inhaler 2 Puff(s) Inhalation two times a day  clotrimazole 1% Cream 1 Application(s) Topical two times a day  dextrose 5%. 1000 milliLiter(s) (50 mL/Hr) IV Continuous <Continuous>  dextrose 5%. 1000 milliLiter(s) (100 mL/Hr) IV Continuous <Continuous>  dextrose 50% Injectable 25 Gram(s) IV Push once  dextrose 50% Injectable 12.5 Gram(s) IV Push once  dextrose 50% Injectable 25 Gram(s) IV Push once  famotidine    Tablet 20 milliGRAM(s) Oral daily  furosemide   Injectable 40 milliGRAM(s) IV Push daily  glucagon  Injectable 1 milliGRAM(s) IntraMuscular once  heparin  Infusion.  Unit(s)/Hr (24 mL/Hr) IV Continuous <Continuous>  insulin glargine Injectable (LANTUS) 45 Unit(s) SubCutaneous at bedtime  insulin glargine Injectable (LANTUS) 45 Unit(s) SubCutaneous every morning  insulin lispro (ADMELOG) corrective regimen sliding scale   SubCutaneous three times a day before meals  insulin lispro Injectable (ADMELOG) 35 Unit(s) SubCutaneous three times a day before meals  ipratropium    for Nebulization 500 MICROGram(s) Nebulizer every 6 hours  lactulose Syrup 20 Gram(s) Oral daily  levalbuterol Inhalation 0.63 milliGRAM(s) Inhalation every 6 hours  methylPREDNISolone sodium succinate Injectable 40 milliGRAM(s) IV Push every 8 hours  metoprolol tartrate 25 milliGRAM(s) Oral every 8 hours  nystatin Powder 1 Application(s) Topical two times a day  oxyCODONE  ER Tablet 60 milliGRAM(s) Oral every 12 hours  polyethylene glycol 3350 17 Gram(s) Oral daily  senna 2 Tablet(s) Oral at bedtime      MEDICATIONS  (PRN):  acetaminophen     Tablet .. 650 milliGRAM(s) Oral every 6 hours PRN Temp greater or equal to 38C (100.4F), Mild Pain (1 - 3)  albuterol    90 MICROgram(s) HFA Inhaler 2 Puff(s) Inhalation every 2 hours PRN Shortness of Breath and/or Wheezing  ALPRAZolam 0.25 milliGRAM(s) Oral every 8 hours PRN for anxiety/Sleep  dextrose Oral Gel 15 Gram(s) Oral once PRN Blood Glucose LESS THAN 70 milliGRAM(s)/deciliter  diphenhydrAMINE 25 milliGRAM(s) Oral every 6 hours PRN Rash and/or Itching  heparin   Injectable 14246 Unit(s) IV Push every 6 hours PRN For aPTT less than 40  heparin   Injectable 5000 Unit(s) IV Push every 6 hours PRN For aPTT between 40 - 57  hydrALAZINE Injectable 10 milliGRAM(s) IV Push every 4 hours PRN for systolic bp > 160  HYDROmorphone  Injectable 1 milliGRAM(s) IV Push every 4 hours PRN breakthrough  pain  HYDROmorphone  Injectable 0.5 milliGRAM(s) IV Push every 6 hours PRN Severe Pain (7 - 10)  melatonin 3 milliGRAM(s) Oral at bedtime PRN Insomnia  metoprolol tartrate Injectable 5 milliGRAM(s) IV Push every 6 hours PRN heart rate sustaining greater than 130  ondansetron Injectable 4 milliGRAM(s) IV Push every 8 hours PRN Nausea and/or Vomiting  oxyCODONE    IR 5 milliGRAM(s) Oral every 6 hours PRN Moderate Pain (4 - 6)      Allergies    wool- rash, itch (Other)  adhesives (Rash)  latex (Rash)  Bactrim (Flushing)    Intolerances        PAST MEDICAL & SURGICAL HISTORY:  Diabetes Mellitus Type II      HTN (Hypertension)      Endometrial Hyperplasia      Cervical Stenosis of Spine      Spinal Stenosis, Lumbar      Deep Vein Thrombosis (DVT)  Left leg, 2004, treated and resolved      Dyslipidemia      Cataract      Morbid Obesity      Vitamin D deficiency      Insomnia      CKD (chronic kidney disease)  ~ III      Congestive heart failure  ~ HFpEF      Uterine cancer      Asthma      On home oxygen therapy      Gait difficulty  ~ u ses walker      Cataract extracted with lens implant 1998  Right      C Section 1994      Cholecystectomy/appendectomy @ age 25      D&C x2 1980's      D&C 2008  hysteroscopy, endometrial hyperplasia, 2009      Cervical Spinal Stenosis surgery x2 (01/2002, 7/2002)      Tonsillectomy as a child      Endometrial biopsy 12/02/09      H/O laser iridotomy  left eye, 2016      H/O colonoscopy  1998      S/P total abdominal hysterectomy and bilateral salpingo-oophorectomy      S/P appendectomy      S/P cholecystectomy          SOCIAL HISTORY  Smoking History:       REVIEW OF SYSTEMS:    CONSTITUTIONAL:  No distress    HEENT:  Eyes:  No diplopia or blurred vision. ENT:  No earache, sore throat or runny nose.    CARDIOVASCULAR:  No pressure, squeezing, tightness, heaviness or aching about the chest; no palpitations.    RESPIRATORY:  see HPI    GASTROINTESTINAL:  No nausea, vomiting or diarrhea.    GENITOURINARY:  No dysuria, frequency or urgency.    NEUROLOGIC:  No paresthesias, fasciculations, seizures or weakness.    PSYCHIATRIC:  No disorder of thought or mood.    Vital Signs Last 24 Hrs  T(C): 37 (23 Dec 2023 10:00), Max: 37.2 (23 Dec 2023 00:27)  T(F): 98.6 (23 Dec 2023 10:00), Max: 99 (23 Dec 2023 00:27)  HR: 102 (23 Dec 2023 10:00) (72 - 122)  BP: 111/86 (23 Dec 2023 10:00) (103/69 - 142/55)  BP(mean): 91 (23 Dec 2023 10:00) (65 - 93)  RR: 20 (23 Dec 2023 10:00) (19 - 26)  SpO2: 94% (23 Dec 2023 10:00) (93% - 99%)    Parameters below as of 23 Dec 2023 10:00  Patient On (Oxygen Delivery Method): nasal cannula, high flow  O2 Flow (L/min): 40  O2 Concentration (%): 30    PHYSICAL EXAMINATION:    GENERAL: The patient is awake and alert in no apparent distress.     HEENT: Head is normocephalic and atraumatic. Extraocular muscles are intact. Mucous membranes are moist.    NECK: Supple.    LUNGS: moderate air entry bilat  with exp rhonchi; respirations unlabored    HEART: Regular rate and rhythm without murmur.    ABDOMEN: Soft, nontender, and nondistended.      EXTREMITIES: With edema.    NEUROLOGIC: Grossly intact.    LABS:                        15.3   22.76 )-----------( 263      ( 23 Dec 2023 04:35 )             45.9     12-23    137  |  93<L>  |  62.4<H>  ----------------------------<  231<H>  4.4   |  33.0<H>  |  1.65<H>    Ca    9.0      23 Dec 2023 04:35  Phos  3.0     12-23  Mg     2.5     12-23      PT/INR - ( 21 Dec 2023 15:56 )   PT: 11.4 sec;   INR: 1.03 ratio         PTT - ( 23 Dec 2023 13:11 )  PTT:103.9 sec  Urinalysis Basic - ( 23 Dec 2023 04:35 )    Color: x / Appearance: x / SG: x / pH: x  Gluc: 231 mg/dL / Ketone: x  / Bili: x / Urobili: x   Blood: x / Protein: x / Nitrite: x   Leuk Esterase: x / RBC: x / WBC x   Sq Epi: x / Non Sq Epi: x / Bacteria: x      ABG - ( 22 Dec 2023 06:23 )  pH, Arterial: 7.490 pH, Blood: x     /  pCO2: 52    /  pO2: 94    / HCO3: 40    / Base Excess: 16.3  /  SaO2: 98.3                        Procalcitonin, Serum: 0.11 ng/mL (12-22-23 @ 04:51)      MICROBIOLOGY:    RADIOLOGY & ADDITIONAL STUDIES:

## 2023-12-23 NOTE — PROGRESS NOTE ADULT - NUTRITIONAL ASSESSMENT
This patient has been assessed with a concern for Malnutrition and has been determined to have a diagnosis/diagnoses of Morbid obesity (BMI > 40).    This patient is being managed with:   Diet Consistent Carbohydrate w/Evening Snack-  1500mL Fluid Restriction ([id number])  Low Sodium  Entered: Dec 19 2023  6:23PM   This patient has been assessed with a concern for Malnutrition and has been determined to have a diagnosis/diagnoses of Morbid obesity (BMI > 40).    This patient is being managed with:   Diet Consistent Carbohydrate w/Evening Snack-  1500mL Fluid Restriction ([id number])  Low Sodium  Entered: Dec 19 2023  6:23PM

## 2023-12-23 NOTE — PROGRESS NOTE ADULT - SUBJECTIVE AND OBJECTIVE BOX
[facility] Division of Medicine    SUBJECTIVE / OVERNIGHT EVENTS: Pt seen at the bedside.  Patient denies chest pain, SOB, abd pain, N/V, fever, chills, dysuria or any other complaints. All remainder ROS negative.     MEDICATIONS  (STANDING):  atorvastatin 80 milliGRAM(s) Oral at bedtime  budesonide  80 MICROgram(s)/formoterol 4.5 MICROgram(s) Inhaler 2 Puff(s) Inhalation two times a day  clotrimazole 1% Cream 1 Application(s) Topical two times a day  dextrose 5%. 1000 milliLiter(s) (100 mL/Hr) IV Continuous <Continuous>  dextrose 5%. 1000 milliLiter(s) (50 mL/Hr) IV Continuous <Continuous>  dextrose 50% Injectable 12.5 Gram(s) IV Push once  dextrose 50% Injectable 25 Gram(s) IV Push once  dextrose 50% Injectable 25 Gram(s) IV Push once  famotidine    Tablet 20 milliGRAM(s) Oral daily  furosemide   Injectable 40 milliGRAM(s) IV Push daily  glucagon  Injectable 1 milliGRAM(s) IntraMuscular once  heparin  Infusion.  Unit(s)/Hr (24 mL/Hr) IV Continuous <Continuous>  insulin glargine Injectable (LANTUS) 45 Unit(s) SubCutaneous at bedtime  insulin glargine Injectable (LANTUS) 45 Unit(s) SubCutaneous every morning  insulin lispro (ADMELOG) corrective regimen sliding scale   SubCutaneous three times a day before meals  insulin lispro Injectable (ADMELOG) 35 Unit(s) SubCutaneous three times a day before meals  ipratropium    for Nebulization 500 MICROGram(s) Nebulizer every 6 hours  lactulose Syrup 20 Gram(s) Oral daily  levalbuterol Inhalation 0.63 milliGRAM(s) Inhalation every 6 hours  methylPREDNISolone sodium succinate Injectable 40 milliGRAM(s) IV Push every 8 hours  metoprolol tartrate 25 milliGRAM(s) Oral every 8 hours  nystatin Powder 1 Application(s) Topical two times a day  oxyCODONE  ER Tablet 60 milliGRAM(s) Oral every 12 hours  piperacillin/tazobactam IVPB.- 3.375 Gram(s) IV Intermittent once  piperacillin/tazobactam IVPB.. 3.375 Gram(s) IV Intermittent every 8 hours  polyethylene glycol 3350 17 Gram(s) Oral daily  senna 2 Tablet(s) Oral at bedtime    MEDICATIONS  (PRN):  acetaminophen     Tablet .. 650 milliGRAM(s) Oral every 6 hours PRN Temp greater or equal to 38C (100.4F), Mild Pain (1 - 3)  albuterol    90 MICROgram(s) HFA Inhaler 2 Puff(s) Inhalation every 2 hours PRN Shortness of Breath and/or Wheezing  ALPRAZolam 0.25 milliGRAM(s) Oral every 8 hours PRN for anxiety/Sleep  dextrose Oral Gel 15 Gram(s) Oral once PRN Blood Glucose LESS THAN 70 milliGRAM(s)/deciliter  diphenhydrAMINE 25 milliGRAM(s) Oral every 6 hours PRN Rash and/or Itching  heparin   Injectable 61936 Unit(s) IV Push every 6 hours PRN For aPTT less than 40  heparin   Injectable 5000 Unit(s) IV Push every 6 hours PRN For aPTT between 40 - 57  hydrALAZINE Injectable 10 milliGRAM(s) IV Push every 4 hours PRN for systolic bp > 160  HYDROmorphone  Injectable 1 milliGRAM(s) IV Push every 4 hours PRN breakthrough  pain  HYDROmorphone  Injectable 0.5 milliGRAM(s) IV Push every 6 hours PRN Severe Pain (7 - 10)  melatonin 3 milliGRAM(s) Oral at bedtime PRN Insomnia  metoprolol tartrate Injectable 5 milliGRAM(s) IV Push every 6 hours PRN heart rate sustaining greater than 130  ondansetron Injectable 4 milliGRAM(s) IV Push every 8 hours PRN Nausea and/or Vomiting  oxyCODONE    IR 5 milliGRAM(s) Oral every 6 hours PRN Moderate Pain (4 - 6)      I&O's Summary    22 Dec 2023 07:01  -  23 Dec 2023 07:00  --------------------------------------------------------  IN: 196 mL / OUT: 3200 mL / NET: -3004 mL    23 Dec 2023 07:01  -  23 Dec 2023 13:40  --------------------------------------------------------  IN: 48 mL / OUT: 1050 mL / NET: -1002 mL        PHYSICAL EXAM:  Vital Signs Last 24 Hrs  T(C): 37 (23 Dec 2023 10:00), Max: 37.2 (23 Dec 2023 00:27)  T(F): 98.6 (23 Dec 2023 10:00), Max: 99 (23 Dec 2023 00:27)  HR: 102 (23 Dec 2023 10:00) (72 - 142)  BP: 111/86 (23 Dec 2023 10:00) (95/55 - 142/55)  BP(mean): 91 (23 Dec 2023 10:00) (64 - 93)  RR: 20 (23 Dec 2023 10:00) (19 - 26)  SpO2: 94% (23 Dec 2023 10:00) (93% - 99%)    Parameters below as of 23 Dec 2023 10:00  Patient On (Oxygen Delivery Method): nasal cannula, high flow  O2 Flow (L/min): 40  O2 Concentration (%): 30      GENERAL: not in acute distress  HEENT:  Clear conjunctiva, PERRL,  EOMI, moist oral mucosa no pharyngeal injection or exudates, no cervical LAD  RESP:  Non-labored breathing pattern, positive wheezes appreciated, no crackles  CV: Regular rate and rhythm, no murmurs appreciated  GI: Soft, non-tender, non-distended  NEURO: Awake, alert, conversant, upper and lower extremity strength and light touch sensation grossly intact  PSYCH: Calm, cooperative, A&Ox3  SKIN: No rash or lesions, warm and dry      LABS:                        15.3   22.76 )-----------( 263      ( 23 Dec 2023 04:35 )             45.9     12-23    137  |  93<L>  |  62.4<H>  ----------------------------<  231<H>  4.4   |  33.0<H>  |  1.65<H>    Ca    9.0      23 Dec 2023 04:35  Phos  3.0     12-23  Mg     2.5     12-23      PT/INR - ( 21 Dec 2023 15:56 )   PT: 11.4 sec;   INR: 1.03 ratio         PTT - ( 23 Dec 2023 13:11 )  PTT:103.9 sec      Urinalysis Basic - ( 23 Dec 2023 04:35 )    Color: x / Appearance: x / SG: x / pH: x  Gluc: 231 mg/dL / Ketone: x  / Bili: x / Urobili: x   Blood: x / Protein: x / Nitrite: x   Leuk Esterase: x / RBC: x / WBC x   Sq Epi: x / Non Sq Epi: x / Bacteria: x        CAPILLARY BLOOD GLUCOSE      POCT Blood Glucose.: 225 mg/dL (23 Dec 2023 08:33)  POCT Blood Glucose.: 338 mg/dL (22 Dec 2023 22:03)  POCT Blood Glucose.: 302 mg/dL (22 Dec 2023 18:25)      IMAGING:                                   [facility] Division of Medicine    SUBJECTIVE / OVERNIGHT EVENTS: Pt seen at the bedside.  Patient denies chest pain, SOB, abd pain, N/V, fever, chills, dysuria or any other complaints. All remainder ROS negative.     MEDICATIONS  (STANDING):  atorvastatin 80 milliGRAM(s) Oral at bedtime  budesonide  80 MICROgram(s)/formoterol 4.5 MICROgram(s) Inhaler 2 Puff(s) Inhalation two times a day  clotrimazole 1% Cream 1 Application(s) Topical two times a day  dextrose 5%. 1000 milliLiter(s) (100 mL/Hr) IV Continuous <Continuous>  dextrose 5%. 1000 milliLiter(s) (50 mL/Hr) IV Continuous <Continuous>  dextrose 50% Injectable 12.5 Gram(s) IV Push once  dextrose 50% Injectable 25 Gram(s) IV Push once  dextrose 50% Injectable 25 Gram(s) IV Push once  famotidine    Tablet 20 milliGRAM(s) Oral daily  furosemide   Injectable 40 milliGRAM(s) IV Push daily  glucagon  Injectable 1 milliGRAM(s) IntraMuscular once  heparin  Infusion.  Unit(s)/Hr (24 mL/Hr) IV Continuous <Continuous>  insulin glargine Injectable (LANTUS) 45 Unit(s) SubCutaneous at bedtime  insulin glargine Injectable (LANTUS) 45 Unit(s) SubCutaneous every morning  insulin lispro (ADMELOG) corrective regimen sliding scale   SubCutaneous three times a day before meals  insulin lispro Injectable (ADMELOG) 35 Unit(s) SubCutaneous three times a day before meals  ipratropium    for Nebulization 500 MICROGram(s) Nebulizer every 6 hours  lactulose Syrup 20 Gram(s) Oral daily  levalbuterol Inhalation 0.63 milliGRAM(s) Inhalation every 6 hours  methylPREDNISolone sodium succinate Injectable 40 milliGRAM(s) IV Push every 8 hours  metoprolol tartrate 25 milliGRAM(s) Oral every 8 hours  nystatin Powder 1 Application(s) Topical two times a day  oxyCODONE  ER Tablet 60 milliGRAM(s) Oral every 12 hours  piperacillin/tazobactam IVPB.- 3.375 Gram(s) IV Intermittent once  piperacillin/tazobactam IVPB.. 3.375 Gram(s) IV Intermittent every 8 hours  polyethylene glycol 3350 17 Gram(s) Oral daily  senna 2 Tablet(s) Oral at bedtime    MEDICATIONS  (PRN):  acetaminophen     Tablet .. 650 milliGRAM(s) Oral every 6 hours PRN Temp greater or equal to 38C (100.4F), Mild Pain (1 - 3)  albuterol    90 MICROgram(s) HFA Inhaler 2 Puff(s) Inhalation every 2 hours PRN Shortness of Breath and/or Wheezing  ALPRAZolam 0.25 milliGRAM(s) Oral every 8 hours PRN for anxiety/Sleep  dextrose Oral Gel 15 Gram(s) Oral once PRN Blood Glucose LESS THAN 70 milliGRAM(s)/deciliter  diphenhydrAMINE 25 milliGRAM(s) Oral every 6 hours PRN Rash and/or Itching  heparin   Injectable 92851 Unit(s) IV Push every 6 hours PRN For aPTT less than 40  heparin   Injectable 5000 Unit(s) IV Push every 6 hours PRN For aPTT between 40 - 57  hydrALAZINE Injectable 10 milliGRAM(s) IV Push every 4 hours PRN for systolic bp > 160  HYDROmorphone  Injectable 1 milliGRAM(s) IV Push every 4 hours PRN breakthrough  pain  HYDROmorphone  Injectable 0.5 milliGRAM(s) IV Push every 6 hours PRN Severe Pain (7 - 10)  melatonin 3 milliGRAM(s) Oral at bedtime PRN Insomnia  metoprolol tartrate Injectable 5 milliGRAM(s) IV Push every 6 hours PRN heart rate sustaining greater than 130  ondansetron Injectable 4 milliGRAM(s) IV Push every 8 hours PRN Nausea and/or Vomiting  oxyCODONE    IR 5 milliGRAM(s) Oral every 6 hours PRN Moderate Pain (4 - 6)      I&O's Summary    22 Dec 2023 07:01  -  23 Dec 2023 07:00  --------------------------------------------------------  IN: 196 mL / OUT: 3200 mL / NET: -3004 mL    23 Dec 2023 07:01  -  23 Dec 2023 13:40  --------------------------------------------------------  IN: 48 mL / OUT: 1050 mL / NET: -1002 mL        PHYSICAL EXAM:  Vital Signs Last 24 Hrs  T(C): 37 (23 Dec 2023 10:00), Max: 37.2 (23 Dec 2023 00:27)  T(F): 98.6 (23 Dec 2023 10:00), Max: 99 (23 Dec 2023 00:27)  HR: 102 (23 Dec 2023 10:00) (72 - 142)  BP: 111/86 (23 Dec 2023 10:00) (95/55 - 142/55)  BP(mean): 91 (23 Dec 2023 10:00) (64 - 93)  RR: 20 (23 Dec 2023 10:00) (19 - 26)  SpO2: 94% (23 Dec 2023 10:00) (93% - 99%)    Parameters below as of 23 Dec 2023 10:00  Patient On (Oxygen Delivery Method): nasal cannula, high flow  O2 Flow (L/min): 40  O2 Concentration (%): 30      GENERAL: not in acute distress  HEENT:  Clear conjunctiva, PERRL,  EOMI, moist oral mucosa no pharyngeal injection or exudates, no cervical LAD  RESP:  Non-labored breathing pattern, positive wheezes appreciated, no crackles  CV: Regular rate and rhythm, no murmurs appreciated  GI: Soft, non-tender, non-distended  NEURO: Awake, alert, conversant, upper and lower extremity strength and light touch sensation grossly intact  PSYCH: Calm, cooperative, A&Ox3  SKIN: No rash or lesions, warm and dry      LABS:                        15.3   22.76 )-----------( 263      ( 23 Dec 2023 04:35 )             45.9     12-23    137  |  93<L>  |  62.4<H>  ----------------------------<  231<H>  4.4   |  33.0<H>  |  1.65<H>    Ca    9.0      23 Dec 2023 04:35  Phos  3.0     12-23  Mg     2.5     12-23      PT/INR - ( 21 Dec 2023 15:56 )   PT: 11.4 sec;   INR: 1.03 ratio         PTT - ( 23 Dec 2023 13:11 )  PTT:103.9 sec      Urinalysis Basic - ( 23 Dec 2023 04:35 )    Color: x / Appearance: x / SG: x / pH: x  Gluc: 231 mg/dL / Ketone: x  / Bili: x / Urobili: x   Blood: x / Protein: x / Nitrite: x   Leuk Esterase: x / RBC: x / WBC x   Sq Epi: x / Non Sq Epi: x / Bacteria: x        CAPILLARY BLOOD GLUCOSE      POCT Blood Glucose.: 225 mg/dL (23 Dec 2023 08:33)  POCT Blood Glucose.: 338 mg/dL (22 Dec 2023 22:03)  POCT Blood Glucose.: 302 mg/dL (22 Dec 2023 18:25)      IMAGING:                                   [facility] Division of Medicine    SUBJECTIVE / OVERNIGHT EVENTS: Pt seen at the bedside. States feeling somewhat better than last couple of days.  Patient denies chest pain, SOB, abd pain, N/V, fever, chills, dysuria or any other complaints. All remainder ROS negative.     MEDICATIONS  (STANDING):  atorvastatin 80 milliGRAM(s) Oral at bedtime  budesonide  80 MICROgram(s)/formoterol 4.5 MICROgram(s) Inhaler 2 Puff(s) Inhalation two times a day  clotrimazole 1% Cream 1 Application(s) Topical two times a day  dextrose 5%. 1000 milliLiter(s) (100 mL/Hr) IV Continuous <Continuous>  dextrose 5%. 1000 milliLiter(s) (50 mL/Hr) IV Continuous <Continuous>  dextrose 50% Injectable 12.5 Gram(s) IV Push once  dextrose 50% Injectable 25 Gram(s) IV Push once  dextrose 50% Injectable 25 Gram(s) IV Push once  famotidine    Tablet 20 milliGRAM(s) Oral daily  furosemide   Injectable 40 milliGRAM(s) IV Push daily  glucagon  Injectable 1 milliGRAM(s) IntraMuscular once  heparin  Infusion.  Unit(s)/Hr (24 mL/Hr) IV Continuous <Continuous>  insulin glargine Injectable (LANTUS) 45 Unit(s) SubCutaneous at bedtime  insulin glargine Injectable (LANTUS) 45 Unit(s) SubCutaneous every morning  insulin lispro (ADMELOG) corrective regimen sliding scale   SubCutaneous three times a day before meals  insulin lispro Injectable (ADMELOG) 35 Unit(s) SubCutaneous three times a day before meals  ipratropium    for Nebulization 500 MICROGram(s) Nebulizer every 6 hours  lactulose Syrup 20 Gram(s) Oral daily  levalbuterol Inhalation 0.63 milliGRAM(s) Inhalation every 6 hours  methylPREDNISolone sodium succinate Injectable 40 milliGRAM(s) IV Push every 8 hours  metoprolol tartrate 25 milliGRAM(s) Oral every 8 hours  nystatin Powder 1 Application(s) Topical two times a day  oxyCODONE  ER Tablet 60 milliGRAM(s) Oral every 12 hours  piperacillin/tazobactam IVPB.- 3.375 Gram(s) IV Intermittent once  piperacillin/tazobactam IVPB.. 3.375 Gram(s) IV Intermittent every 8 hours  polyethylene glycol 3350 17 Gram(s) Oral daily  senna 2 Tablet(s) Oral at bedtime    MEDICATIONS  (PRN):  acetaminophen     Tablet .. 650 milliGRAM(s) Oral every 6 hours PRN Temp greater or equal to 38C (100.4F), Mild Pain (1 - 3)  albuterol    90 MICROgram(s) HFA Inhaler 2 Puff(s) Inhalation every 2 hours PRN Shortness of Breath and/or Wheezing  ALPRAZolam 0.25 milliGRAM(s) Oral every 8 hours PRN for anxiety/Sleep  dextrose Oral Gel 15 Gram(s) Oral once PRN Blood Glucose LESS THAN 70 milliGRAM(s)/deciliter  diphenhydrAMINE 25 milliGRAM(s) Oral every 6 hours PRN Rash and/or Itching  heparin   Injectable 33623 Unit(s) IV Push every 6 hours PRN For aPTT less than 40  heparin   Injectable 5000 Unit(s) IV Push every 6 hours PRN For aPTT between 40 - 57  hydrALAZINE Injectable 10 milliGRAM(s) IV Push every 4 hours PRN for systolic bp > 160  HYDROmorphone  Injectable 1 milliGRAM(s) IV Push every 4 hours PRN breakthrough  pain  HYDROmorphone  Injectable 0.5 milliGRAM(s) IV Push every 6 hours PRN Severe Pain (7 - 10)  melatonin 3 milliGRAM(s) Oral at bedtime PRN Insomnia  metoprolol tartrate Injectable 5 milliGRAM(s) IV Push every 6 hours PRN heart rate sustaining greater than 130  ondansetron Injectable 4 milliGRAM(s) IV Push every 8 hours PRN Nausea and/or Vomiting  oxyCODONE    IR 5 milliGRAM(s) Oral every 6 hours PRN Moderate Pain (4 - 6)      I&O's Summary    22 Dec 2023 07:01  -  23 Dec 2023 07:00  --------------------------------------------------------  IN: 196 mL / OUT: 3200 mL / NET: -3004 mL    23 Dec 2023 07:01  -  23 Dec 2023 13:40  --------------------------------------------------------  IN: 48 mL / OUT: 1050 mL / NET: -1002 mL        PHYSICAL EXAM:  Vital Signs Last 24 Hrs  T(C): 37 (23 Dec 2023 10:00), Max: 37.2 (23 Dec 2023 00:27)  T(F): 98.6 (23 Dec 2023 10:00), Max: 99 (23 Dec 2023 00:27)  HR: 102 (23 Dec 2023 10:00) (72 - 142)  BP: 111/86 (23 Dec 2023 10:00) (95/55 - 142/55)  BP(mean): 91 (23 Dec 2023 10:00) (64 - 93)  RR: 20 (23 Dec 2023 10:00) (19 - 26)  SpO2: 94% (23 Dec 2023 10:00) (93% - 99%)    Parameters below as of 23 Dec 2023 10:00  Patient On (Oxygen Delivery Method): nasal cannula, high flow  O2 Flow (L/min): 40  O2 Concentration (%): 30      GENERAL: not in acute distress  HEENT:  Clear conjunctiva, PERRL,  EOMI, moist oral mucosa no pharyngeal injection or exudates, no cervical LAD  RESP:  Non-labored breathing pattern, positive wheezes appreciated, no crackles  CV: Regular rate and rhythm, no murmurs appreciated  GI: Soft, non-tender, non-distended  NEURO: Awake, alert, conversant, upper and lower extremity strength and light touch sensation grossly intact  PSYCH: Calm, cooperative, A&Ox3  SKIN: No rash or lesions, warm and dry      LABS:                        15.3   22.76 )-----------( 263      ( 23 Dec 2023 04:35 )             45.9     12-23    137  |  93<L>  |  62.4<H>  ----------------------------<  231<H>  4.4   |  33.0<H>  |  1.65<H>    Ca    9.0      23 Dec 2023 04:35  Phos  3.0     12-23  Mg     2.5     12-23      PT/INR - ( 21 Dec 2023 15:56 )   PT: 11.4 sec;   INR: 1.03 ratio         PTT - ( 23 Dec 2023 13:11 )  PTT:103.9 sec      Urinalysis Basic - ( 23 Dec 2023 04:35 )    Color: x / Appearance: x / SG: x / pH: x  Gluc: 231 mg/dL / Ketone: x  / Bili: x / Urobili: x   Blood: x / Protein: x / Nitrite: x   Leuk Esterase: x / RBC: x / WBC x   Sq Epi: x / Non Sq Epi: x / Bacteria: x        CAPILLARY BLOOD GLUCOSE      POCT Blood Glucose.: 225 mg/dL (23 Dec 2023 08:33)  POCT Blood Glucose.: 338 mg/dL (22 Dec 2023 22:03)  POCT Blood Glucose.: 302 mg/dL (22 Dec 2023 18:25)      IMAGING:                                   [facility] Division of Medicine    SUBJECTIVE / OVERNIGHT EVENTS: Pt seen at the bedside. States feeling somewhat better than last couple of days.  Patient denies chest pain, SOB, abd pain, N/V, fever, chills, dysuria or any other complaints. All remainder ROS negative.     MEDICATIONS  (STANDING):  atorvastatin 80 milliGRAM(s) Oral at bedtime  budesonide  80 MICROgram(s)/formoterol 4.5 MICROgram(s) Inhaler 2 Puff(s) Inhalation two times a day  clotrimazole 1% Cream 1 Application(s) Topical two times a day  dextrose 5%. 1000 milliLiter(s) (100 mL/Hr) IV Continuous <Continuous>  dextrose 5%. 1000 milliLiter(s) (50 mL/Hr) IV Continuous <Continuous>  dextrose 50% Injectable 12.5 Gram(s) IV Push once  dextrose 50% Injectable 25 Gram(s) IV Push once  dextrose 50% Injectable 25 Gram(s) IV Push once  famotidine    Tablet 20 milliGRAM(s) Oral daily  furosemide   Injectable 40 milliGRAM(s) IV Push daily  glucagon  Injectable 1 milliGRAM(s) IntraMuscular once  heparin  Infusion.  Unit(s)/Hr (24 mL/Hr) IV Continuous <Continuous>  insulin glargine Injectable (LANTUS) 45 Unit(s) SubCutaneous at bedtime  insulin glargine Injectable (LANTUS) 45 Unit(s) SubCutaneous every morning  insulin lispro (ADMELOG) corrective regimen sliding scale   SubCutaneous three times a day before meals  insulin lispro Injectable (ADMELOG) 35 Unit(s) SubCutaneous three times a day before meals  ipratropium    for Nebulization 500 MICROGram(s) Nebulizer every 6 hours  lactulose Syrup 20 Gram(s) Oral daily  levalbuterol Inhalation 0.63 milliGRAM(s) Inhalation every 6 hours  methylPREDNISolone sodium succinate Injectable 40 milliGRAM(s) IV Push every 8 hours  metoprolol tartrate 25 milliGRAM(s) Oral every 8 hours  nystatin Powder 1 Application(s) Topical two times a day  oxyCODONE  ER Tablet 60 milliGRAM(s) Oral every 12 hours  piperacillin/tazobactam IVPB.- 3.375 Gram(s) IV Intermittent once  piperacillin/tazobactam IVPB.. 3.375 Gram(s) IV Intermittent every 8 hours  polyethylene glycol 3350 17 Gram(s) Oral daily  senna 2 Tablet(s) Oral at bedtime    MEDICATIONS  (PRN):  acetaminophen     Tablet .. 650 milliGRAM(s) Oral every 6 hours PRN Temp greater or equal to 38C (100.4F), Mild Pain (1 - 3)  albuterol    90 MICROgram(s) HFA Inhaler 2 Puff(s) Inhalation every 2 hours PRN Shortness of Breath and/or Wheezing  ALPRAZolam 0.25 milliGRAM(s) Oral every 8 hours PRN for anxiety/Sleep  dextrose Oral Gel 15 Gram(s) Oral once PRN Blood Glucose LESS THAN 70 milliGRAM(s)/deciliter  diphenhydrAMINE 25 milliGRAM(s) Oral every 6 hours PRN Rash and/or Itching  heparin   Injectable 22000 Unit(s) IV Push every 6 hours PRN For aPTT less than 40  heparin   Injectable 5000 Unit(s) IV Push every 6 hours PRN For aPTT between 40 - 57  hydrALAZINE Injectable 10 milliGRAM(s) IV Push every 4 hours PRN for systolic bp > 160  HYDROmorphone  Injectable 1 milliGRAM(s) IV Push every 4 hours PRN breakthrough  pain  HYDROmorphone  Injectable 0.5 milliGRAM(s) IV Push every 6 hours PRN Severe Pain (7 - 10)  melatonin 3 milliGRAM(s) Oral at bedtime PRN Insomnia  metoprolol tartrate Injectable 5 milliGRAM(s) IV Push every 6 hours PRN heart rate sustaining greater than 130  ondansetron Injectable 4 milliGRAM(s) IV Push every 8 hours PRN Nausea and/or Vomiting  oxyCODONE    IR 5 milliGRAM(s) Oral every 6 hours PRN Moderate Pain (4 - 6)      I&O's Summary    22 Dec 2023 07:01  -  23 Dec 2023 07:00  --------------------------------------------------------  IN: 196 mL / OUT: 3200 mL / NET: -3004 mL    23 Dec 2023 07:01  -  23 Dec 2023 13:40  --------------------------------------------------------  IN: 48 mL / OUT: 1050 mL / NET: -1002 mL        PHYSICAL EXAM:  Vital Signs Last 24 Hrs  T(C): 37 (23 Dec 2023 10:00), Max: 37.2 (23 Dec 2023 00:27)  T(F): 98.6 (23 Dec 2023 10:00), Max: 99 (23 Dec 2023 00:27)  HR: 102 (23 Dec 2023 10:00) (72 - 142)  BP: 111/86 (23 Dec 2023 10:00) (95/55 - 142/55)  BP(mean): 91 (23 Dec 2023 10:00) (64 - 93)  RR: 20 (23 Dec 2023 10:00) (19 - 26)  SpO2: 94% (23 Dec 2023 10:00) (93% - 99%)    Parameters below as of 23 Dec 2023 10:00  Patient On (Oxygen Delivery Method): nasal cannula, high flow  O2 Flow (L/min): 40  O2 Concentration (%): 30      GENERAL: not in acute distress  HEENT:  Clear conjunctiva, PERRL,  EOMI, moist oral mucosa no pharyngeal injection or exudates, no cervical LAD  RESP:  Non-labored breathing pattern, positive wheezes appreciated, no crackles  CV: Regular rate and rhythm, no murmurs appreciated  GI: Soft, non-tender, non-distended  NEURO: Awake, alert, conversant, upper and lower extremity strength and light touch sensation grossly intact  PSYCH: Calm, cooperative, A&Ox3  SKIN: No rash or lesions, warm and dry      LABS:                        15.3   22.76 )-----------( 263      ( 23 Dec 2023 04:35 )             45.9     12-23    137  |  93<L>  |  62.4<H>  ----------------------------<  231<H>  4.4   |  33.0<H>  |  1.65<H>    Ca    9.0      23 Dec 2023 04:35  Phos  3.0     12-23  Mg     2.5     12-23      PT/INR - ( 21 Dec 2023 15:56 )   PT: 11.4 sec;   INR: 1.03 ratio         PTT - ( 23 Dec 2023 13:11 )  PTT:103.9 sec      Urinalysis Basic - ( 23 Dec 2023 04:35 )    Color: x / Appearance: x / SG: x / pH: x  Gluc: 231 mg/dL / Ketone: x  / Bili: x / Urobili: x   Blood: x / Protein: x / Nitrite: x   Leuk Esterase: x / RBC: x / WBC x   Sq Epi: x / Non Sq Epi: x / Bacteria: x        CAPILLARY BLOOD GLUCOSE      POCT Blood Glucose.: 225 mg/dL (23 Dec 2023 08:33)  POCT Blood Glucose.: 338 mg/dL (22 Dec 2023 22:03)  POCT Blood Glucose.: 302 mg/dL (22 Dec 2023 18:25)      IMAGING:

## 2023-12-23 NOTE — PROGRESS NOTE ADULT - PROBLEM SELECTOR PLAN 1
-Volume status mildly up  -TTE with preserved EF  -BNP 1.6K - > 712  -Trop <50  -ACEi/ARBs on hold due to CKD  -Eventual R/LHC once respiratory status back to baseline. Other GDMT to be introduced post cath  -C/w IVP lasix QD

## 2023-12-23 NOTE — PROGRESS NOTE ADULT - ASSESSMENT
Acute on chronic Hypercapnic hypoxemic resp failure  COPD,RSV, HFpEF, Morbid obesity, chronic pain on narcotics  New Consolidation RLL, ABG with met alkalosis, prn diamox  A fib rate controlled, on IV heparin, cath planned  Continue abx, nebs, medrol, gentle diuresis,   continue NIV nocturnal and prn, wean HFNC to cannula  Never seen by Pulmonary in office, will need nocturnal NIV to decrease re admissions and improve mortality, C02 remains elevated despite bipap  HOB elevated  cardiology input noted, LHC/RHC on hold  prognosis guarded Acute on chronic Hypercapnic hypoxemic resp failure  COPD,RSV, HFpEF, Morbid obesity, chronic pain on narcotics  New Consolidation RLL, ABG with met alkalosis, ? allergic to sulfa ( Diamox ), follow lytes closely  A fib rate controlled, on IV heparin, cath planned  Continue abx, nebs, medrol, gentle diuresis,   continue NIV nocturnal and prn, wean HFNC to cannula  Never seen by Pulmonary in office, will need nocturnal NIV to decrease re admissions and improve mortality, C02 remains elevated despite bipap  HOB elevated  cardiology input noted, LHC/RHC on hold  given concomitant met alkalosis , will need abg prior to d/c to eval fo AVAPS   prognosis guarded

## 2023-12-23 NOTE — PROGRESS NOTE ADULT - ASSESSMENT
69 y/o female PMH morbid obesity, HFpEF (EF 55-60% 8/2023), on home O2 4L, pulmonary HTN, was on Eliquis for presumed PE and severe pulmonary hypertension (PASP of 70 mmHg). super morbid obesity, asthma, CKD3, IDDM2, uterine cancer s/p MEGAN, DVT LLE (2004), chronic leg edema 2/2 venous stasis, spinal stenosis with chronic back pain who presents with midsternal chest pressure and shortness of breath. Patient converted to AFib on 12/21.

## 2023-12-23 NOTE — PROGRESS NOTE ADULT - ASSESSMENT
67 yo female with HTN, HLD, DM2, HFpEF, CKD III, DVT, Uterine CA, COPD on home o2 who presented with complaints of shortness of breath. Patient reports that she has been sick all week and was recently started on Vantin and steroids while at the nursing home. Patient reports that her dyspnea just worsened and she told the nursing home to send her in for an evaluation. While in the ED, patient was placed on BIPAP and was given IV lasix with some improvement. RVP then results as RSV +. Patient had a CT scan which also showed pneumonia and pulmonary HTN. Admission to medicine was requested for further management.    #new afib:  echo w/ preserved EF  - EP consulted, no DCCV given resp issues  - cardiology following, plan for R/LHC when resp condition improves  - loaded with digoxin IV x1 then placed on cardizem ggt  - cardizem d/c'd   - c/w loprssor 25mg q8hr, inc as needed  - c/w heparin drip for [id number] 6     # Acute on chronic respiratory failure  # Acute HFpEF exacerbation with likely with Superimposed bacterial Pneumonia in setting of RSV  #Pulmonary hypertension  - s/p 8 days of zosyn, will d/c today  - blood clx NGTD  - c/w IV steroids given persistent wheezing  - levalbuterol and ipratropium q6hrs, symbicort BID   - c/w IV Lasix qd  - BIPAP qhs  - pulm following     # Leukocytosis  - likely steroid induced more than reflection of infectious process  - d/c abx and monitor vitals and CBC  - if either become concerning or infxn will start broadspectrum abx    #Chronic pain  - continue home Pain meds of Oxycodone 60 mg q12h and Oxycodone 5mg q6h PRN    # hx of suspicion for PE. DVT  - Patient has empirically been treated for PE/DVT since August  - No scans noted to have PE/DVT  - duplex lower ext to without dvt   - ddimer negative. dc eliquis per cardiology   - c/w ppx heparin    # HLD  - Atorvastatin for rosuvastatin    # COPD  Steroids as above  Symbicort, Spiriva, albuterol  pulm following     #DM  #steroid induced hyperglycemia  - lantus/premeal insulin per endo  - lispro sliding scale    DVT prophylaxis: HSQ  Dispo: pending resp status optimization     67 yo female with HTN, HLD, DM2, HFpEF, CKD III, DVT, Uterine CA, COPD on home o2 who presented with complaints of shortness of breath. Patient reports that she has been sick all week and was recently started on Vantin and steroids while at the nursing home. Patient reports that her dyspnea just worsened and she told the nursing home to send her in for an evaluation. While in the ED, patient was placed on BIPAP and was given IV lasix with some improvement. RVP then results as RSV +. Patient had a CT scan which also showed pneumonia and pulmonary HTN. Admission to medicine was requested for further management.    #new afib:  echo w/ preserved EF  - EP consulted, no DCCV given resp issues  - cardiology following, plan for R/LHC when resp condition improves  - loaded with digoxin IV x1 then placed on cardizem ggt  - cardizem d/c'd   - c/w loprssor 25mg q8hr, inc as needed  - c/w heparin drip for [id number] 6     # Acute on chronic respiratory failure  # Acute HFpEF exacerbation with likely with Superimposed bacterial Pneumonia in setting of RSV  #Pulmonary hypertension  - s/p 8 days of zosyn, will d/c today  - blood clx NGTD  - c/w IV steroids given persistent wheezing  - levalbuterol and ipratropium q6hrs, symbicort BID   - c/w IV Lasix qd  - BIPAP qhs  - pulm following     # Leukocytosis  - likely steroid induced more than reflection of infectious process  - d/c abx and monitor vitals and CBC  - if either become concerning or infxn will start broadspectrum abx    #Chronic pain  - continue home Pain meds of Oxycodone 60 mg q12h and Oxycodone 5mg q6h PRN    # hx of suspicion for PE. DVT  - Patient has empirically been treated for PE/DVT since August  - No scans noted to have PE/DVT  - duplex lower ext to without dvt   - ddimer negative. dc eliquis per cardiology   - c/w ppx heparin    # HLD  - Atorvastatin for rosuvastatin    # COPD  Steroids as above  Symbicort, Spiriva, albuterol  pulm following     #DM  #steroid induced hyperglycemia  - lantus/premeal insulin per endo  - lispro sliding scale    DVT prophylaxis: HSQ  Dispo: pending resp status optimization     67 yo female with HTN, HLD, DM2, HFpEF, CKD III, DVT, Uterine CA, COPD on home o2 who presented with complaints of shortness of breath. Patient reports that she has been sick all week and was recently started on Vantin and steroids while at the nursing home. Patient reports that her dyspnea just worsened and she told the nursing home to send her in for an evaluation. While in the ED, patient was placed on BIPAP and was given IV lasix with some improvement. RVP then results as RSV +. Patient had a CT scan which also showed pneumonia and pulmonary HTN. Admission to medicine was requested for further management.    #new afib:  echo w/ preserved EF  - EP consulted, no DCCV given resp issues  - cardiology following, plan for R/LHC when resp condition improves  - loaded with digoxin IV x1 then placed on cardizem ggt  - cardizem d/c'd   - c/w loprssor 25mg q8hr, inc as needed  - c/w heparin drip for [id number] 6     # Acute on chronic respiratory failure  # Acute HFpEF exacerbation with likely with Superimposed bacterial Pneumonia in setting of RSV  #Pulmonary hypertension  - s/p 7 days of zosyn, WBC increasing still (possible 2/2 steroids), will continue abx for now  - blood clx NGTD  - check sputum clx, repeat MRSA PCR  - c/w IV steroids given persistent wheezing  - levalbuterol and ipratropium q6hrs, symbicort BID   - c/w IV Lasix qd  - BIPAP qhs  - pulm following     # Leukocytosis  - likely steroid induced more than reflection of infectious process  - d/c abx and monitor vitals and CBC  - if either become concerning or infxn will start broadspectrum abx    #Chronic pain  - continue home Pain meds of Oxycodone 60 mg q12h and Oxycodone 5mg q6h PRN    # hx of suspicion for PE. DVT  - Patient has empirically been treated for PE/DVT since August  - No scans noted to have PE/DVT  - duplex lower ext to without dvt   - ddimer negative. dc eliquis per cardiology   - c/w ppx heparin    # HLD  - Atorvastatin for rosuvastatin    # COPD  Steroids as above  Symbicort, Spiriva, albuterol  pulm following     #DM  #steroid induced hyperglycemia  - lantus/premeal insulin per endo  - lispro sliding scale    DVT prophylaxis: HSQ  Dispo: pending resp status optimization     67 yo female with HTN, HLD, DM2, HFpEF, CKD III, DVT, Uterine CA, COPD on home o2 who presented with complaints of shortness of breath. Patient reports that she has been sick all week and was recently started on Vantin and steroids while at the nursing home. Patient reports that her dyspnea just worsened and she told the nursing home to send her in for an evaluation. While in the ED, patient was placed on BIPAP and was given IV lasix with some improvement. RVP then results as RSV +. Patient had a CT scan which also showed pneumonia and pulmonary HTN. Admission to medicine was requested for further management.    #new afib:  echo w/ preserved EF  - EP consulted, no DCCV given resp issues  - cardiology following, plan for R/LHC when resp condition improves  - loaded with digoxin IV x1 then placed on cardizem ggt  - cardizem d/c'd   - c/w loprssor 25mg q8hr, inc as needed  - c/w heparin drip for [id number] 6     # Acute on chronic respiratory failure  # Acute HFpEF exacerbation with likely with Superimposed bacterial Pneumonia in setting of RSV  #Pulmonary hypertension  - s/p 7 days of zosyn, WBC increasing still (possible 2/2 steroids), will continue abx for now  - blood clx NGTD  - check sputum clx, repeat MRSA PCR  - c/w IV steroids given persistent wheezing  - levalbuterol and ipratropium q6hrs, symbicort BID   - c/w IV Lasix qd  - BIPAP qhs  - pulm following     # Leukocytosis  - likely steroid induced more than reflection of infectious process  - d/c abx and monitor vitals and CBC  - if either become concerning or infxn will start broadspectrum abx    #Chronic pain  - continue home Pain meds of Oxycodone 60 mg q12h and Oxycodone 5mg q6h PRN    # hx of suspicion for PE. DVT  - Patient has empirically been treated for PE/DVT since August  - No scans noted to have PE/DVT  - duplex lower ext to without dvt   - ddimer negative. dc eliquis per cardiology   - c/w ppx heparin    # HLD  - Atorvastatin for rosuvastatin    # COPD  Steroids as above  Symbicort, Spiriva, albuterol  pulm following     #DM  #steroid induced hyperglycemia  - lantus/premeal insulin per endo  - lispro sliding scale    DVT prophylaxis: HSQ  Dispo: pending resp status optimization     67 yo female with HTN, HLD, DM2, HFpEF, CKD III, DVT, Uterine CA, COPD on home o2 who presented with complaints of shortness of breath. Patient reports that she has been sick all week and was recently started on Vantin and steroids while at the nursing home. Patient reports that her dyspnea just worsened and she told the nursing home to send her in for an evaluation. While in the ED, patient was placed on BIPAP and was given IV lasix with some improvement. RVP then results as RSV +. Patient had a CT scan which also showed pneumonia and pulmonary HTN. Admission to medicine was requested for further management.    #new afib:  echo w/ preserved EF  - EP consulted, no DCCV given resp issues  - cardiology following, plan for R/LHC when resp condition improves  - loaded with digoxin IV x1 then placed on cardizem ggt  - cardizem d/c'd   - c/w loprssor 25mg q8hr, inc as needed  - c/w heparin drip for [id number] 6     # Acute on chronic respiratory failure  # Acute HFpEF exacerbation with likely with Superimposed bacterial Pneumonia in setting of RSV  #Pulmonary hypertension  - s/p 7 days of zosyn, WBC increasing still (possible 2/2 steroids), will continue abx for now  - blood clx NGTD  - check sputum clx, repeat MRSA PCR  - c/w IV steroids given persistent wheezing  - levalbuterol and ipratropium q6hrs, symbicort BID   - c/w IV Lasix qd  - BIPAP qhs  - pulm following     # Leukocytosis  - may represent worsening PNA vs steroid induced  - c/w zosyn  - check sputum clx and repeat MRSA PCR  - clinically pt feels better, has been afebrile  - trend CBC and monitor     #Chronic pain  - continue home Pain meds of Oxycodone 60 mg q12h and Oxycodone 5mg q6h PRN    # hx of suspicion for PE. DVT  - Patient has empirically been treated for PE/DVT since August  - No scans noted to have PE/DVT  - duplex lower ext to without dvt   - ddimer negative. dc eliquis per cardiology   - c/w ppx heparin    # HLD  - Atorvastatin for rosuvastatin    # COPD  Steroids as above  Symbicort, Spiriva, albuterol  pulm following     #DM  #steroid induced hyperglycemia  - lantus/premeal insulin per endo  - lispro sliding scale    DVT prophylaxis: HSQ  Dispo: pending resp status optimization     67 yo female with HTN, HLD, DM2, HFpEF, CKD III, DVT, Uterine CA, COPD on home o2 who presented with complaints of shortness of breath. Patient reports that she has been sick all week and was recently started on Vantin and steroids while at the nursing home. Patient reports that her dyspnea just worsened and she told the nursing home to send her in for an evaluation. While in the ED, patient was placed on BIPAP and was given IV lasix with some improvement. RVP then results as RSV +. Patient had a CT scan which also showed pneumonia and pulmonary HTN. Admission to medicine was requested for further management.    #new afib:  echo w/ preserved EF  - EP consulted, no DCCV given resp issues  - cardiology following, plan for R/LHC when resp condition improves  - loaded with digoxin IV x1 then placed on cardizem ggt  - cardizem d/c'd   - c/w loprssor 25mg q8hr, inc as needed  - c/w heparin drip for [id number] 6     # Acute on chronic respiratory failure  # Acute HFpEF exacerbation with likely with Superimposed bacterial Pneumonia in setting of RSV  #Pulmonary hypertension  - s/p 7 days of zosyn, WBC increasing still (possible 2/2 steroids), will continue abx for now  - blood clx NGTD  - check sputum clx, repeat MRSA PCR  - c/w IV steroids given persistent wheezing  - levalbuterol and ipratropium q6hrs, symbicort BID   - c/w IV Lasix qd  - BIPAP qhs  - pulm following     # Leukocytosis  - may represent worsening PNA vs steroid induced  - c/w zosyn  - check sputum clx and repeat MRSA PCR  - clinically pt feels better, has been afebrile  - trend CBC and monitor     #Chronic pain  - continue home Pain meds of Oxycodone 60 mg q12h and Oxycodone 5mg q6h PRN    # hx of suspicion for PE. DVT  - Patient has empirically been treated for PE/DVT since August  - No scans noted to have PE/DVT  - duplex lower ext to without dvt   - ddimer negative. dc eliquis per cardiology   - c/w ppx heparin    # HLD  - Atorvastatin for rosuvastatin    # COPD  Steroids as above  Symbicort, Spiriva, albuterol  pulm following     #DM  #steroid induced hyperglycemia  - lantus/premeal insulin per endo  - lispro sliding scale    DVT prophylaxis: HSQ  Dispo: pending resp status optimization

## 2023-12-23 NOTE — PROGRESS NOTE ADULT - PROBLEM SELECTOR PLAN 2
-New onset  -Rate controlled   -[id number] 6   -S/p 500mcg IVP digoxin 12/22  -Cardizem gtt DCed today  -C/w hep gtt, BB -New onset  -Rate controlled   -[id number] 6   -S/p 500mcg IVP digoxin 12/22  -Cardizem gtt DCed today  -C/w hep gtt, BB

## 2023-12-23 NOTE — PATIENT PROFILE ADULT - FALL HARM RISK - RISK INTERVENTIONS
Assistance OOB with selected safe patient handling equipment/Assistance with ambulation/Communicate Fall Risk and Risk Factors to all staff, patient, and family/Discuss with provider need for PT consult/Monitor gait and stability/Provide patient with walking aids - walker, cane, crutches/Reinforce activity limits and safety measures with patient and family/Visual Cue: Yellow wristband/Bed in lowest position, wheels locked, appropriate side rails in place/Call bell, personal items and telephone in reach/Instruct patient to call for assistance before getting out of bed or chair/Non-slip footwear when patient is out of bed/[place] to call system/Physically safe environment - no spills, clutter or unnecessary equipment/Purposeful Proactive Rounding/Room/bathroom lighting operational, light cord in reach Assistance OOB with selected safe patient handling equipment/Assistance with ambulation/Communicate Fall Risk and Risk Factors to all staff, patient, and family/Discuss with provider need for PT consult/Monitor gait and stability/Provide patient with walking aids - walker, cane, crutches/Reinforce activity limits and safety measures with patient and family/Visual Cue: Yellow wristband/Bed in lowest position, wheels locked, appropriate side rails in place/Call bell, personal items and telephone in reach/Instruct patient to call for assistance before getting out of bed or chair/Non-slip footwear when patient is out of bed/[place] to call system/Physically safe environment - no spills, clutter or unnecessary equipment/Purposeful Proactive Rounding/Room/bathroom lighting operational, light cord in reach

## 2023-12-24 LAB
ANION GAP SERPL CALC-SCNC: 13 MMOL/L — SIGNIFICANT CHANGE UP (ref 5–17)
ANION GAP SERPL CALC-SCNC: 13 MMOL/L — SIGNIFICANT CHANGE UP (ref 5–17)
APTT BLD: 165.4 SEC — CRITICAL HIGH (ref 24.5–35.6)
APTT BLD: 165.4 SEC — CRITICAL HIGH (ref 24.5–35.6)
APTT BLD: 53.6 SEC — HIGH (ref 24.5–35.6)
APTT BLD: 53.6 SEC — HIGH (ref 24.5–35.6)
APTT BLD: 57.1 SEC — HIGH (ref 24.5–35.6)
APTT BLD: 57.1 SEC — HIGH (ref 24.5–35.6)
BUN SERPL-MCNC: 70 MG/DL — HIGH (ref 8–20)
BUN SERPL-MCNC: 70 MG/DL — HIGH (ref 8–20)
CALCIUM SERPL-MCNC: 9 MG/DL — SIGNIFICANT CHANGE UP (ref 8.4–10.5)
CALCIUM SERPL-MCNC: 9 MG/DL — SIGNIFICANT CHANGE UP (ref 8.4–10.5)
CHLORIDE SERPL-SCNC: 89 MMOL/L — LOW (ref 96–108)
CHLORIDE SERPL-SCNC: 89 MMOL/L — LOW (ref 96–108)
CO2 SERPL-SCNC: 31 MMOL/L — HIGH (ref 22–29)
CO2 SERPL-SCNC: 31 MMOL/L — HIGH (ref 22–29)
CREAT SERPL-MCNC: 1.74 MG/DL — HIGH (ref 0.5–1.3)
CREAT SERPL-MCNC: 1.74 MG/DL — HIGH (ref 0.5–1.3)
EGFR: 31 ML/MIN/1.73M2 — LOW
EGFR: 31 ML/MIN/1.73M2 — LOW
GLUCOSE BLDC GLUCOMTR-MCNC: 234 MG/DL — HIGH (ref 70–99)
GLUCOSE BLDC GLUCOMTR-MCNC: 234 MG/DL — HIGH (ref 70–99)
GLUCOSE BLDC GLUCOMTR-MCNC: 268 MG/DL — HIGH (ref 70–99)
GLUCOSE BLDC GLUCOMTR-MCNC: 268 MG/DL — HIGH (ref 70–99)
GLUCOSE BLDC GLUCOMTR-MCNC: 311 MG/DL — HIGH (ref 70–99)
GLUCOSE BLDC GLUCOMTR-MCNC: 311 MG/DL — HIGH (ref 70–99)
GLUCOSE BLDC GLUCOMTR-MCNC: 331 MG/DL — HIGH (ref 70–99)
GLUCOSE BLDC GLUCOMTR-MCNC: 331 MG/DL — HIGH (ref 70–99)
GLUCOSE BLDC GLUCOMTR-MCNC: 335 MG/DL — HIGH (ref 70–99)
GLUCOSE BLDC GLUCOMTR-MCNC: 335 MG/DL — HIGH (ref 70–99)
GLUCOSE BLDC GLUCOMTR-MCNC: 337 MG/DL — HIGH (ref 70–99)
GLUCOSE BLDC GLUCOMTR-MCNC: 337 MG/DL — HIGH (ref 70–99)
GLUCOSE SERPL-MCNC: 405 MG/DL — HIGH (ref 70–99)
GLUCOSE SERPL-MCNC: 405 MG/DL — HIGH (ref 70–99)
HCT VFR BLD CALC: 46.4 % — HIGH (ref 34.5–45)
HCT VFR BLD CALC: 46.4 % — HIGH (ref 34.5–45)
HGB BLD-MCNC: 15 G/DL — SIGNIFICANT CHANGE UP (ref 11.5–15.5)
HGB BLD-MCNC: 15 G/DL — SIGNIFICANT CHANGE UP (ref 11.5–15.5)
MAGNESIUM SERPL-MCNC: 2.6 MG/DL — SIGNIFICANT CHANGE UP (ref 1.6–2.6)
MAGNESIUM SERPL-MCNC: 2.6 MG/DL — SIGNIFICANT CHANGE UP (ref 1.6–2.6)
MCHC RBC-ENTMCNC: 29 PG — SIGNIFICANT CHANGE UP (ref 27–34)
MCHC RBC-ENTMCNC: 29 PG — SIGNIFICANT CHANGE UP (ref 27–34)
MCHC RBC-ENTMCNC: 32.3 GM/DL — SIGNIFICANT CHANGE UP (ref 32–36)
MCHC RBC-ENTMCNC: 32.3 GM/DL — SIGNIFICANT CHANGE UP (ref 32–36)
MCV RBC AUTO: 89.6 FL — SIGNIFICANT CHANGE UP (ref 80–100)
MCV RBC AUTO: 89.6 FL — SIGNIFICANT CHANGE UP (ref 80–100)
PLATELET # BLD AUTO: 297 K/UL — SIGNIFICANT CHANGE UP (ref 150–400)
PLATELET # BLD AUTO: 297 K/UL — SIGNIFICANT CHANGE UP (ref 150–400)
POTASSIUM SERPL-MCNC: 5 MMOL/L — SIGNIFICANT CHANGE UP (ref 3.5–5.3)
POTASSIUM SERPL-MCNC: 5 MMOL/L — SIGNIFICANT CHANGE UP (ref 3.5–5.3)
POTASSIUM SERPL-SCNC: 5 MMOL/L — SIGNIFICANT CHANGE UP (ref 3.5–5.3)
POTASSIUM SERPL-SCNC: 5 MMOL/L — SIGNIFICANT CHANGE UP (ref 3.5–5.3)
RBC # BLD: 5.18 M/UL — SIGNIFICANT CHANGE UP (ref 3.8–5.2)
RBC # BLD: 5.18 M/UL — SIGNIFICANT CHANGE UP (ref 3.8–5.2)
RBC # FLD: 13.2 % — SIGNIFICANT CHANGE UP (ref 10.3–14.5)
RBC # FLD: 13.2 % — SIGNIFICANT CHANGE UP (ref 10.3–14.5)
SODIUM SERPL-SCNC: 133 MMOL/L — LOW (ref 135–145)
SODIUM SERPL-SCNC: 133 MMOL/L — LOW (ref 135–145)
WBC # BLD: 22.84 K/UL — HIGH (ref 3.8–10.5)
WBC # BLD: 22.84 K/UL — HIGH (ref 3.8–10.5)
WBC # FLD AUTO: 22.84 K/UL — HIGH (ref 3.8–10.5)
WBC # FLD AUTO: 22.84 K/UL — HIGH (ref 3.8–10.5)

## 2023-12-24 PROCEDURE — 99233 SBSQ HOSP IP/OBS HIGH 50: CPT

## 2023-12-24 PROCEDURE — 71045 X-RAY EXAM CHEST 1 VIEW: CPT | Mod: 26

## 2023-12-24 PROCEDURE — 99234 HOSP IP/OBS SM DT SF/LOW 45: CPT

## 2023-12-24 RX ORDER — METOPROLOL TARTRATE 50 MG
50 TABLET ORAL EVERY 8 HOURS
Refills: 0 | Status: DISCONTINUED | OUTPATIENT
Start: 2023-12-24 | End: 2023-12-28

## 2023-12-24 RX ADMIN — PIPERACILLIN AND TAZOBACTAM 25 GRAM(S): 4; .5 INJECTION, POWDER, LYOPHILIZED, FOR SOLUTION INTRAVENOUS at 14:19

## 2023-12-24 RX ADMIN — HEPARIN SODIUM 1100 UNIT(S)/HR: 5000 INJECTION INTRAVENOUS; SUBCUTANEOUS at 22:16

## 2023-12-24 RX ADMIN — HEPARIN SODIUM 1100 UNIT(S)/HR: 5000 INJECTION INTRAVENOUS; SUBCUTANEOUS at 19:23

## 2023-12-24 RX ADMIN — Medication 0.25 MILLIGRAM(S): at 18:48

## 2023-12-24 RX ADMIN — Medication 35 UNIT(S): at 09:33

## 2023-12-24 RX ADMIN — Medication 40 MILLIGRAM(S): at 06:05

## 2023-12-24 RX ADMIN — HEPARIN SODIUM 0 UNIT(S)/HR: 5000 INJECTION INTRAVENOUS; SUBCUTANEOUS at 10:16

## 2023-12-24 RX ADMIN — PIPERACILLIN AND TAZOBACTAM 25 GRAM(S): 4; .5 INJECTION, POWDER, LYOPHILIZED, FOR SOLUTION INTRAVENOUS at 22:10

## 2023-12-24 RX ADMIN — HEPARIN SODIUM 1200 UNIT(S)/HR: 5000 INJECTION INTRAVENOUS; SUBCUTANEOUS at 02:50

## 2023-12-24 RX ADMIN — NYSTATIN CREAM 1 APPLICATION(S): 100000 CREAM TOPICAL at 06:05

## 2023-12-24 RX ADMIN — Medication 40 MILLIGRAM(S): at 22:08

## 2023-12-24 RX ADMIN — HYDROMORPHONE HYDROCHLORIDE 1 MILLIGRAM(S): 2 INJECTION INTRAMUSCULAR; INTRAVENOUS; SUBCUTANEOUS at 23:33

## 2023-12-24 RX ADMIN — HEPARIN SODIUM 5000 UNIT(S): 5000 INJECTION INTRAVENOUS; SUBCUTANEOUS at 19:22

## 2023-12-24 RX ADMIN — INSULIN GLARGINE 45 UNIT(S): 100 INJECTION, SOLUTION SUBCUTANEOUS at 22:10

## 2023-12-24 RX ADMIN — Medication 40 MILLIGRAM(S): at 06:04

## 2023-12-24 RX ADMIN — LEVALBUTEROL 0.63 MILLIGRAM(S): 1.25 SOLUTION, CONCENTRATE RESPIRATORY (INHALATION) at 09:30

## 2023-12-24 RX ADMIN — OXYCODONE HYDROCHLORIDE 5 MILLIGRAM(S): 5 TABLET ORAL at 23:09

## 2023-12-24 RX ADMIN — OXYCODONE HYDROCHLORIDE 60 MILLIGRAM(S): 5 TABLET ORAL at 18:07

## 2023-12-24 RX ADMIN — ONDANSETRON 4 MILLIGRAM(S): 8 TABLET, FILM COATED ORAL at 19:57

## 2023-12-24 RX ADMIN — ATORVASTATIN CALCIUM 80 MILLIGRAM(S): 80 TABLET, FILM COATED ORAL at 22:09

## 2023-12-24 RX ADMIN — SENNA PLUS 2 TABLET(S): 8.6 TABLET ORAL at 22:09

## 2023-12-24 RX ADMIN — FAMOTIDINE 20 MILLIGRAM(S): 10 INJECTION INTRAVENOUS at 14:19

## 2023-12-24 RX ADMIN — Medication 40 MILLIGRAM(S): at 14:18

## 2023-12-24 RX ADMIN — Medication 25 MILLIGRAM(S): at 06:05

## 2023-12-24 RX ADMIN — BUDESONIDE AND FORMOTEROL FUMARATE DIHYDRATE 2 PUFF(S): 160; 4.5 AEROSOL RESPIRATORY (INHALATION) at 21:21

## 2023-12-24 RX ADMIN — INSULIN GLARGINE 45 UNIT(S): 100 INJECTION, SOLUTION SUBCUTANEOUS at 09:32

## 2023-12-24 RX ADMIN — NYSTATIN CREAM 1 APPLICATION(S): 100000 CREAM TOPICAL at 18:07

## 2023-12-24 RX ADMIN — OXYCODONE HYDROCHLORIDE 5 MILLIGRAM(S): 5 TABLET ORAL at 22:09

## 2023-12-24 RX ADMIN — HEPARIN SODIUM 800 UNIT(S)/HR: 5000 INJECTION INTRAVENOUS; SUBCUTANEOUS at 11:57

## 2023-12-24 RX ADMIN — Medication 1 APPLICATION(S): at 06:04

## 2023-12-24 RX ADMIN — Medication 8: at 14:14

## 2023-12-24 RX ADMIN — OXYCODONE HYDROCHLORIDE 5 MILLIGRAM(S): 5 TABLET ORAL at 14:18

## 2023-12-24 RX ADMIN — Medication 500 MICROGRAM(S): at 21:20

## 2023-12-24 RX ADMIN — Medication 1 APPLICATION(S): at 18:07

## 2023-12-24 RX ADMIN — LEVALBUTEROL 0.63 MILLIGRAM(S): 1.25 SOLUTION, CONCENTRATE RESPIRATORY (INHALATION) at 21:20

## 2023-12-24 RX ADMIN — Medication 35 UNIT(S): at 14:13

## 2023-12-24 RX ADMIN — Medication 10: at 09:33

## 2023-12-24 RX ADMIN — PIPERACILLIN AND TAZOBACTAM 25 GRAM(S): 4; .5 INJECTION, POWDER, LYOPHILIZED, FOR SOLUTION INTRAVENOUS at 06:04

## 2023-12-24 RX ADMIN — LEVALBUTEROL 0.63 MILLIGRAM(S): 1.25 SOLUTION, CONCENTRATE RESPIRATORY (INHALATION) at 15:08

## 2023-12-24 RX ADMIN — Medication 500 MICROGRAM(S): at 15:08

## 2023-12-24 RX ADMIN — OXYCODONE HYDROCHLORIDE 5 MILLIGRAM(S): 5 TABLET ORAL at 15:18

## 2023-12-24 RX ADMIN — Medication 10: at 18:08

## 2023-12-24 RX ADMIN — Medication 50 MILLIGRAM(S): at 22:09

## 2023-12-24 RX ADMIN — Medication 35 UNIT(S): at 18:08

## 2023-12-24 RX ADMIN — OXYCODONE HYDROCHLORIDE 60 MILLIGRAM(S): 5 TABLET ORAL at 06:05

## 2023-12-24 RX ADMIN — Medication 50 MILLIGRAM(S): at 14:18

## 2023-12-24 RX ADMIN — Medication 500 MICROGRAM(S): at 15:11

## 2023-12-24 RX ADMIN — HEPARIN SODIUM 5000 UNIT(S): 5000 INJECTION INTRAVENOUS; SUBCUTANEOUS at 02:55

## 2023-12-24 NOTE — PROGRESS NOTE ADULT - NS ATTEND AMEND GEN_ALL_CORE FT
seen with above,    68F super morbid obesity (BMI >60), ENRIUQE/OHS, chronic respiratory failure, severe pulmonary HTN with RV dysfunction, CKD 3, HFpEF, recent admission 9/2023 with hypoxic respiratory failure unable to get CTPA or VQ scan and was empirically treated with Eliquis now readmitted for acute on chronic hypoxemic respiratory failure on treatment with IV diuresis and empiric Zosyn with Solumedrol, +RSV as well    -was on nasal BiPAP and been on HFNC x5 days with improving sat 97%, gradual improvement in respiratory status, repeat weight <400 lbs  -diuresing well on IV Lasix 40mg once daily, Cr. stable, continue to aim for net negative output been diuresing 2-3 liters daily (prior admission Cr. peak at 2.1), , will continue optimize to reattempt next week when she will be able to come off HFNC  -may need addition of acetazolamide if further uptrend in bicarb  -been in persistent Afib RVR up to 130-140s refractory to PO/IV metoprolol, given IV 0.5mg digoxin and started on dilitazem gtt @5mg/hr, HR better controlled will continue metoprolol alone without diltiazem gtt increase dose to 50mg q8hrs, continue to uptitrate for goal resting HR 110s as long as BP tolerates  -prior SBP 90s, off amlodipine for now, may consider spironolactone or Entresto after cath if Cr. stable  -ABG with chronic CO2 retention, she is awake/alert and conversive, continue nocturnal BiPAP and pulmonary follow up. Pending repeat CT chest  -discontinued empiric Eliquis, normal D-dimer, but now on heparin gtt for pAfib and continue until after R/LHC  -need weight loss         Lenard Van DO, [facility]  Faculty Non-Invasive Cardiologist  [phone] seen with above,    68F super morbid obesity (BMI >60), ENRIQUE/OHS, chronic respiratory failure, severe pulmonary HTN with RV dysfunction, CKD 3, HFpEF, recent admission 9/2023 with hypoxic respiratory failure unable to get CTPA or VQ scan and was empirically treated with Eliquis now readmitted for acute on chronic hypoxemic respiratory failure on treatment with IV diuresis and empiric Zosyn with Solumedrol, +RSV as well    -was on nasal BiPAP and been on HFNC x5 days with improving sat 97%, gradual improvement in respiratory status, repeat weight <400 lbs  -diuresing well on IV Lasix 40mg once daily, Cr. stable, continue to aim for net negative output been diuresing 2-3 liters daily (prior admission Cr. peak at 2.1), , will continue optimize to reattempt next week when she will be able to come off HFNC  -may need addition of acetazolamide if further uptrend in bicarb  -been in persistent Afib RVR up to 130-140s refractory to PO/IV metoprolol, given IV 0.5mg digoxin and started on dilitazem gtt @5mg/hr, HR better controlled will continue metoprolol alone without diltiazem gtt increase dose to 50mg q8hrs, continue to uptitrate for goal resting HR 110s as long as BP tolerates  -prior SBP 90s, off amlodipine for now, may consider spironolactone or Entresto after cath if Cr. stable  -ABG with chronic CO2 retention, she is awake/alert and conversive, continue nocturnal BiPAP and pulmonary follow up. Pending repeat CT chest  -discontinued empiric Eliquis, normal D-dimer, but now on heparin gtt for pAfib and continue until after R/LHC  -need weight loss         Lenard Van DO, [facility]  Faculty Non-Invasive Cardiologist  [phone]

## 2023-12-24 NOTE — PROGRESS NOTE ADULT - NUTRITIONAL ASSESSMENT
This patient has been assessed with a concern for Malnutrition and has been determined to have a diagnosis/diagnoses of Morbid obesity (BMI > 40).    This patient is being managed with:   Diet Consistent Carbohydrate w/Evening Snack-  1500mL Fluid Restriction ([id number])  Low Sodium  Entered: Dec 19 2023  6:23PM   This patient has been assessed with a concern for Malnutrition and has been determined to have a diagnosis/diagnoses of Morbid obesity (BMI > 40).    This patient is being managed with:   Diet Consistent Carbohydrate w/Evening Snack-  1500mL Fluid Restriction ([id number])  Low Sodium  Entered: Dec 19 2023  6:23PM

## 2023-12-24 NOTE — PROGRESS NOTE ADULT - ASSESSMENT
67 y/o female PMH morbid obesity, HFpEF (EF 55-60% 8/2023), on home O2 4L, pulmonary HTN, was on Eliquis for presumed PE and severe pulmonary hypertension (PASP of 70 mmHg). super morbid obesity, asthma, CKD3, IDDM2, uterine cancer s/p MEGAN, DVT LLE (2004), chronic leg edema 2/2 venous stasis, spinal stenosis with chronic back pain who presents with midsternal chest pressure and shortness of breath. Patient converted to AFib on 12/21.

## 2023-12-24 NOTE — PROGRESS NOTE ADULT - SUBJECTIVE AND OBJECTIVE BOX
[facility]                                                         CARDIOLOGY AT [place] [address]                                                         Telephone: [phone]. Fax:[phone]                                                                             PROGRESS NOTE    Reason for follow up:  New aflutter/HFpEF  Update:   Placed on high flow with occasional desats.   ON Lasix 40mg IVP and -1344ml/24hours.  Improvement per patient in edema  Review of symptoms:   Cardiac:  No chest pain. No dyspnea. No palpitations.  Respiratory: no cough. No dyspnea  Gastrointestinal: No diarrhea. No abdominal pain. No bleeding.   Neuro: No focal neuro complaints.    Vitals:  T(C): 37.2 (12-24-23 @ 08:32), Max: 37.6 (12-23-23 @ 14:00)  HR: 97 (12-24-23 @ 09:15) (70 - 123)  BP: 121/43 (12-24-23 @ 08:00) (104/51 - 146/71)  RR: 22 (12-24-23 @ 08:00) (19 - 22)  SpO2: 98% (12-24-23 @ 09:15) (92% - 98%)  I&O's Summary    23 Dec 2023 07:01  -  24 Dec 2023 07:00  --------------------------------------------------------  IN: 452 mL / OUT: 1850 mL / NET: -1398 mL  24 Dec 2023 07:01  -  24 Dec 2023 09:55  --------------------------------------------------------  IN: 25 mL / OUT: 0 mL / NET: 25 mL    PHYSICAL EXAM:  Appearance: Comfortable. No acute distress  HEENT:  Atraumatic. Normocephalic.  Normal oral mucosa  Neurologic: A & O x 3, no gross focal deficits.  Cardiovascular: RRR S1 S2, No murmur, no rubs/gallops. No JVD  Respiratory: Lungs clear to auscultation, unlabored   Gastrointestinal:  Soft, Non-tender, + BS  Lower Extremities: + Peripheral Pulses, No clubbing, cyanosis, + edema 2, per patient legs are softening  Psychiatry: Patient is calm. No agitation.   Skin: warm and dry.    CURRENT CARDIAC MEDICATIONS:  furosemide   Injectable 40 milliGRAM(s) IV Push daily  hydrALAZINE Injectable 10 milliGRAM(s) IV Push every 4 hours PRN  metoprolol tartrate 25 milliGRAM(s) Oral every 8 hours  metoprolol tartrate Injectable 5 milliGRAM(s) IV Push every 6 hours PRN      CURRENT OTHER MEDICATIONS:  albuterol    90 MICROgram(s) HFA Inhaler 2 Puff(s) Inhalation every 2 hours PRN Shortness of Breath and/or Wheezing  budesonide  80 MICROgram(s)/formoterol 4.5 MICROgram(s) Inhaler 2 Puff(s) Inhalation two times a day  ipratropium    for Nebulization 500 MICROGram(s) Nebulizer every 6 hours  levalbuterol Inhalation 0.63 milliGRAM(s) Inhalation every 6 hours  piperacillin/tazobactam IVPB.. 3.375 Gram(s) IV Intermittent every 8 hours  acetaminophen     Tablet .. 650 milliGRAM(s) Oral every 6 hours PRN Temp greater or equal to 38C (100.4F), Mild Pain (1 - 3)  ALPRAZolam 0.25 milliGRAM(s) Oral every 8 hours PRN for anxiety/Sleep  diphenhydrAMINE 25 milliGRAM(s) Oral every 6 hours PRN Rash and/or Itching  HYDROmorphone  Injectable 1 milliGRAM(s) IV Push every 4 hours PRN breakthrough  pain  HYDROmorphone  Injectable 0.5 milliGRAM(s) IV Push every 6 hours PRN Severe Pain (7 - 10)  melatonin 3 milliGRAM(s) Oral at bedtime PRN Insomnia  ondansetron Injectable 4 milliGRAM(s) IV Push every 8 hours PRN Nausea and/or Vomiting  oxyCODONE    IR 5 milliGRAM(s) Oral every 6 hours PRN Moderate Pain (4 - 6)  oxyCODONE  ER Tablet 60 milliGRAM(s) Oral every 12 hours  famotidine    Tablet 20 milliGRAM(s) Oral daily  lactulose Syrup 20 Gram(s) Oral daily  polyethylene glycol 3350 17 Gram(s) Oral daily  senna 2 Tablet(s) Oral at bedtime  atorvastatin 80 milliGRAM(s) Oral at bedtime  dextrose 50% Injectable 25 Gram(s) IV Push once, Stop order after: 1 Doses  dextrose 50% Injectable 12.5 Gram(s) IV Push once, Stop order after: 1 Doses  dextrose 50% Injectable 25 Gram(s) IV Push once, Stop order after: 1 Doses  dextrose Oral Gel 15 Gram(s) Oral once, Stop order after: 1 Doses PRN Blood Glucose LESS THAN 70 milliGRAM(s)/deciliter  glucagon  Injectable 1 milliGRAM(s) IntraMuscular once, Stop order after: 1 Doses  insulin glargine Injectable (LANTUS) 45 Unit(s) SubCutaneous at bedtime  insulin glargine Injectable (LANTUS) 45 Unit(s) SubCutaneous every morning  insulin lispro (ADMELOG) corrective regimen sliding scale   SubCutaneous three times a day before meals  insulin lispro Injectable (ADMELOG) 35 Unit(s) SubCutaneous three times a day before meals  methylPREDNISolone sodium succinate Injectable 40 milliGRAM(s) IV Push every 8 hours  clotrimazole 1% Cream 1 Application(s) Topical two times a day  dextrose 5%. 1000 milliLiter(s) (100 mL/Hr) IV Continuous <Continuous>  dextrose 5%. 1000 milliLiter(s) (50 mL/Hr) IV Continuous <Continuous>  heparin   Injectable 5000 Unit(s) IV Push every 6 hours PRN For aPTT between 40 - 57  heparin   Injectable 18761 Unit(s) IV Push every 6 hours PRN For aPTT less than 40  heparin  Infusion.  Unit(s)/Hr (24 mL/Hr) IV Continuous <Continuous>  nystatin Powder 1 Application(s) Topical two times a day      LABS:	 	                       15.0   22.84 )-----------( 297      ( 24 Dec 2023 08:41 )             46.4     12-23    137  |  93<L>  |  62.4<H>  ----------------------------<  231<H>  4.4   |  33.0<H>  |  1.65<H>    Ca    9.0      23 Dec 2023 04:35  Phos  3.0     12-23  Mg     2.5     12-23      PT/INR/PTT ( 24 Dec 2023 02:50 )                       :                       :      X            :       57.1                  .        .                   .              .           .       X           .                                       Lipid Profile: Date: 12-17 @ 08:20  Total cholesterol 140; Direct LDL: --; HDL: 70; Triglycerides:133    TSH: Thyroid Stimulating Hormone, Serum: 0.34 uIU/mL    TELEMETRY:   Afib.  Occasional PVC's,                                                                 [facility]                                                         CARDIOLOGY AT [place] [address]                                                         Telephone: [phone]. Fax:[phone]                                                                             PROGRESS NOTE    Reason for follow up:  New aflutter/HFpEF  Update:   Placed on high flow with occasional desats.   ON Lasix 40mg IVP and -1344ml/24hours.  Improvement per patient in edema  Review of symptoms:   Cardiac:  No chest pain. No dyspnea. No palpitations.  Respiratory: no cough. No dyspnea  Gastrointestinal: No diarrhea. No abdominal pain. No bleeding.   Neuro: No focal neuro complaints.    Vitals:  T(C): 37.2 (12-24-23 @ 08:32), Max: 37.6 (12-23-23 @ 14:00)  HR: 97 (12-24-23 @ 09:15) (70 - 123)  BP: 121/43 (12-24-23 @ 08:00) (104/51 - 146/71)  RR: 22 (12-24-23 @ 08:00) (19 - 22)  SpO2: 98% (12-24-23 @ 09:15) (92% - 98%)  I&O's Summary    23 Dec 2023 07:01  -  24 Dec 2023 07:00  --------------------------------------------------------  IN: 452 mL / OUT: 1850 mL / NET: -1398 mL  24 Dec 2023 07:01  -  24 Dec 2023 09:55  --------------------------------------------------------  IN: 25 mL / OUT: 0 mL / NET: 25 mL    PHYSICAL EXAM:  Appearance: Comfortable. No acute distress  HEENT:  Atraumatic. Normocephalic.  Normal oral mucosa  Neurologic: A & O x 3, no gross focal deficits.  Cardiovascular: RRR S1 S2, No murmur, no rubs/gallops. No JVD  Respiratory: Lungs clear to auscultation, unlabored   Gastrointestinal:  Soft, Non-tender, + BS  Lower Extremities: + Peripheral Pulses, No clubbing, cyanosis, + edema 2, per patient legs are softening  Psychiatry: Patient is calm. No agitation.   Skin: warm and dry.    CURRENT CARDIAC MEDICATIONS:  furosemide   Injectable 40 milliGRAM(s) IV Push daily  hydrALAZINE Injectable 10 milliGRAM(s) IV Push every 4 hours PRN  metoprolol tartrate 25 milliGRAM(s) Oral every 8 hours  metoprolol tartrate Injectable 5 milliGRAM(s) IV Push every 6 hours PRN      CURRENT OTHER MEDICATIONS:  albuterol    90 MICROgram(s) HFA Inhaler 2 Puff(s) Inhalation every 2 hours PRN Shortness of Breath and/or Wheezing  budesonide  80 MICROgram(s)/formoterol 4.5 MICROgram(s) Inhaler 2 Puff(s) Inhalation two times a day  ipratropium    for Nebulization 500 MICROGram(s) Nebulizer every 6 hours  levalbuterol Inhalation 0.63 milliGRAM(s) Inhalation every 6 hours  piperacillin/tazobactam IVPB.. 3.375 Gram(s) IV Intermittent every 8 hours  acetaminophen     Tablet .. 650 milliGRAM(s) Oral every 6 hours PRN Temp greater or equal to 38C (100.4F), Mild Pain (1 - 3)  ALPRAZolam 0.25 milliGRAM(s) Oral every 8 hours PRN for anxiety/Sleep  diphenhydrAMINE 25 milliGRAM(s) Oral every 6 hours PRN Rash and/or Itching  HYDROmorphone  Injectable 1 milliGRAM(s) IV Push every 4 hours PRN breakthrough  pain  HYDROmorphone  Injectable 0.5 milliGRAM(s) IV Push every 6 hours PRN Severe Pain (7 - 10)  melatonin 3 milliGRAM(s) Oral at bedtime PRN Insomnia  ondansetron Injectable 4 milliGRAM(s) IV Push every 8 hours PRN Nausea and/or Vomiting  oxyCODONE    IR 5 milliGRAM(s) Oral every 6 hours PRN Moderate Pain (4 - 6)  oxyCODONE  ER Tablet 60 milliGRAM(s) Oral every 12 hours  famotidine    Tablet 20 milliGRAM(s) Oral daily  lactulose Syrup 20 Gram(s) Oral daily  polyethylene glycol 3350 17 Gram(s) Oral daily  senna 2 Tablet(s) Oral at bedtime  atorvastatin 80 milliGRAM(s) Oral at bedtime  dextrose 50% Injectable 25 Gram(s) IV Push once, Stop order after: 1 Doses  dextrose 50% Injectable 12.5 Gram(s) IV Push once, Stop order after: 1 Doses  dextrose 50% Injectable 25 Gram(s) IV Push once, Stop order after: 1 Doses  dextrose Oral Gel 15 Gram(s) Oral once, Stop order after: 1 Doses PRN Blood Glucose LESS THAN 70 milliGRAM(s)/deciliter  glucagon  Injectable 1 milliGRAM(s) IntraMuscular once, Stop order after: 1 Doses  insulin glargine Injectable (LANTUS) 45 Unit(s) SubCutaneous at bedtime  insulin glargine Injectable (LANTUS) 45 Unit(s) SubCutaneous every morning  insulin lispro (ADMELOG) corrective regimen sliding scale   SubCutaneous three times a day before meals  insulin lispro Injectable (ADMELOG) 35 Unit(s) SubCutaneous three times a day before meals  methylPREDNISolone sodium succinate Injectable 40 milliGRAM(s) IV Push every 8 hours  clotrimazole 1% Cream 1 Application(s) Topical two times a day  dextrose 5%. 1000 milliLiter(s) (100 mL/Hr) IV Continuous <Continuous>  dextrose 5%. 1000 milliLiter(s) (50 mL/Hr) IV Continuous <Continuous>  heparin   Injectable 5000 Unit(s) IV Push every 6 hours PRN For aPTT between 40 - 57  heparin   Injectable 44967 Unit(s) IV Push every 6 hours PRN For aPTT less than 40  heparin  Infusion.  Unit(s)/Hr (24 mL/Hr) IV Continuous <Continuous>  nystatin Powder 1 Application(s) Topical two times a day      LABS:	 	                       15.0   22.84 )-----------( 297      ( 24 Dec 2023 08:41 )             46.4     12-23    137  |  93<L>  |  62.4<H>  ----------------------------<  231<H>  4.4   |  33.0<H>  |  1.65<H>    Ca    9.0      23 Dec 2023 04:35  Phos  3.0     12-23  Mg     2.5     12-23      PT/INR/PTT ( 24 Dec 2023 02:50 )                       :                       :      X            :       57.1                  .        .                   .              .           .       X           .                                       Lipid Profile: Date: 12-17 @ 08:20  Total cholesterol 140; Direct LDL: --; HDL: 70; Triglycerides:133    TSH: Thyroid Stimulating Hormone, Serum: 0.34 uIU/mL    TELEMETRY:   Afib.  Occasional PVC's,

## 2023-12-24 NOTE — PROGRESS NOTE ADULT - PROBLEM SELECTOR PLAN 2
New onset  With rates up to 130's - 140's.  Increase lopressor to 50mg po q 8 with parameters.    [id number] 6   S/p 500mcg IVP digoxin 12/22  C/w hep gtt, BB. New onset  With rates up to 130's - 140's.  Increase lopressor to 50mg po q 8 with parameters.    [id number] 6   S/p 500mcg IVP digoxin 12/22  C/w hep gtt, BB.

## 2023-12-24 NOTE — PROGRESS NOTE ADULT - PROBLEM SELECTOR PLAN 1
Volume status mildly up  TTE with preserved EF  BNP 1.6K - > 712  Trop <50  ACEi/ARBs on hold due to CKD  Eventual R/LHC once respiratory status back to baseline.   Other GDMT to be introduced post cath  C/w IVP lasix QD.  -1344ml/24hours  Strict I and O's  Wean oxgyen as tolerated.

## 2023-12-24 NOTE — PROGRESS NOTE ADULT - SUBJECTIVE AND OBJECTIVE BOX
[facility] Division of Medicine    SUBJECTIVE / OVERNIGHT EVENTS: Pt seen at the bedside.  Patient denies chest pain, SOB, abd pain, N/V, fever, chills, dysuria or any other complaints. All remainder ROS negative.     MEDICATIONS  (STANDING):  atorvastatin 80 milliGRAM(s) Oral at bedtime  budesonide  80 MICROgram(s)/formoterol 4.5 MICROgram(s) Inhaler 2 Puff(s) Inhalation two times a day  clotrimazole 1% Cream 1 Application(s) Topical two times a day  dextrose 5%. 1000 milliLiter(s) (100 mL/Hr) IV Continuous <Continuous>  dextrose 5%. 1000 milliLiter(s) (50 mL/Hr) IV Continuous <Continuous>  dextrose 50% Injectable 25 Gram(s) IV Push once  dextrose 50% Injectable 12.5 Gram(s) IV Push once  dextrose 50% Injectable 25 Gram(s) IV Push once  famotidine    Tablet 20 milliGRAM(s) Oral daily  furosemide   Injectable 40 milliGRAM(s) IV Push daily  glucagon  Injectable 1 milliGRAM(s) IntraMuscular once  heparin  Infusion.  Unit(s)/Hr (24 mL/Hr) IV Continuous <Continuous>  insulin glargine Injectable (LANTUS) 45 Unit(s) SubCutaneous at bedtime  insulin glargine Injectable (LANTUS) 45 Unit(s) SubCutaneous every morning  insulin lispro (ADMELOG) corrective regimen sliding scale   SubCutaneous three times a day before meals  insulin lispro Injectable (ADMELOG) 35 Unit(s) SubCutaneous three times a day before meals  ipratropium    for Nebulization 500 MICROGram(s) Nebulizer every 6 hours  lactulose Syrup 20 Gram(s) Oral daily  levalbuterol Inhalation 0.63 milliGRAM(s) Inhalation every 6 hours  methylPREDNISolone sodium succinate Injectable 40 milliGRAM(s) IV Push every 8 hours  metoprolol tartrate 50 milliGRAM(s) Oral every 8 hours  nystatin Powder 1 Application(s) Topical two times a day  oxyCODONE  ER Tablet 60 milliGRAM(s) Oral every 12 hours  piperacillin/tazobactam IVPB.. 3.375 Gram(s) IV Intermittent every 8 hours  polyethylene glycol 3350 17 Gram(s) Oral daily  senna 2 Tablet(s) Oral at bedtime    MEDICATIONS  (PRN):  acetaminophen     Tablet .. 650 milliGRAM(s) Oral every 6 hours PRN Temp greater or equal to 38C (100.4F), Mild Pain (1 - 3)  albuterol    90 MICROgram(s) HFA Inhaler 2 Puff(s) Inhalation every 2 hours PRN Shortness of Breath and/or Wheezing  ALPRAZolam 0.25 milliGRAM(s) Oral every 8 hours PRN for anxiety/Sleep  dextrose Oral Gel 15 Gram(s) Oral once PRN Blood Glucose LESS THAN 70 milliGRAM(s)/deciliter  diphenhydrAMINE 25 milliGRAM(s) Oral every 6 hours PRN Rash and/or Itching  heparin   Injectable 5000 Unit(s) IV Push every 6 hours PRN For aPTT between 40 - 57  heparin   Injectable 63298 Unit(s) IV Push every 6 hours PRN For aPTT less than 40  hydrALAZINE Injectable 10 milliGRAM(s) IV Push every 4 hours PRN for systolic bp > 160  HYDROmorphone  Injectable 1 milliGRAM(s) IV Push every 4 hours PRN breakthrough  pain  HYDROmorphone  Injectable 0.5 milliGRAM(s) IV Push every 6 hours PRN Severe Pain (7 - 10)  melatonin 3 milliGRAM(s) Oral at bedtime PRN Insomnia  metoprolol tartrate Injectable 5 milliGRAM(s) IV Push every 6 hours PRN heart rate sustaining greater than 130  ondansetron Injectable 4 milliGRAM(s) IV Push every 8 hours PRN Nausea and/or Vomiting  oxyCODONE    IR 5 milliGRAM(s) Oral every 6 hours PRN Moderate Pain (4 - 6)      I&O's Summary    23 Dec 2023 07:01  -  24 Dec 2023 07:00  --------------------------------------------------------  IN: 452 mL / OUT: 1850 mL / NET: -1398 mL    24 Dec 2023 07:01  -  24 Dec 2023 13:35  --------------------------------------------------------  IN: 69 mL / OUT: 1200 mL / NET: -1131 mL        PHYSICAL EXAM:  Vital Signs Last 24 Hrs  T(C): 37 (24 Dec 2023 10:00), Max: 37.6 (23 Dec 2023 14:00)  T(F): 98.6 (24 Dec 2023 10:00), Max: 99.7 (23 Dec 2023 14:00)  HR: 98 (24 Dec 2023 10:00) (79 - 123)  BP: 114/46 (24 Dec 2023 10:00) (104/51 - 146/71)  BP(mean): 61 (24 Dec 2023 10:00) (60 - 94)  RR: 20 (24 Dec 2023 10:00) (19 - 22)  SpO2: 96% (24 Dec 2023 10:00) (92% - 98%)    Parameters below as of 24 Dec 2023 10:00  Patient On (Oxygen Delivery Method): nasal cannula, high flow  O2 Flow (L/min): 30  O2 Concentration (%): 40      GENERAL: not in acute distress  HEENT:  Clear conjunctiva, PERRL,  EOMI, moist oral mucosa no pharyngeal injection or exudates, no cervical LAD  RESP:  Non-labored breathing pattern, lungs clear to ausculation, no wheezes or crackles appreciated  CV: Regular rate and rhythm, no murmurs appreciated, no lower extremity edema, peripheral pulses are 2+ bilaterally  GI: Soft, non-tender, non-distended  NEURO: Awake, alert, conversant, upper and lower extremity strength and light touch sensation grossly intact  PSYCH: Calm, cooperative, A&Ox3  SKIN: No rash or lesions, warm and dry      LABS:                        15.0   22.84 )-----------( 297      ( 24 Dec 2023 08:41 )             46.4     12-24    133<L>  |  89<L>  |  70.0<H>  ----------------------------<  405<H>  5.0   |  31.0<H>  |  1.74<H>    Ca    9.0      24 Dec 2023 08:41  Phos  3.0     12-23  Mg     2.6     12-24      PTT - ( 24 Dec 2023 08:41 )  PTT:165.4 sec      Urinalysis Basic - ( 24 Dec 2023 08:41 )    Color: x / Appearance: x / SG: x / pH: x  Gluc: 405 mg/dL / Ketone: x  / Bili: x / Urobili: x   Blood: x / Protein: x / Nitrite: x   Leuk Esterase: x / RBC: x / WBC x   Sq Epi: x / Non Sq Epi: x / Bacteria: x        CAPILLARY BLOOD GLUCOSE      POCT Blood Glucose.: 268 mg/dL (24 Dec 2023 13:17)  POCT Blood Glucose.: 335 mg/dL (24 Dec 2023 08:57)  POCT Blood Glucose.: 331 mg/dL (24 Dec 2023 05:55)  POCT Blood Glucose.: 337 mg/dL (24 Dec 2023 05:54)  POCT Blood Glucose.: 227 mg/dL (23 Dec 2023 22:05)  POCT Blood Glucose.: 196 mg/dL (23 Dec 2023 18:18)  POCT Blood Glucose.: 291 mg/dL (23 Dec 2023 13:59)      IMAGING:                                   [facility] Division of Medicine    SUBJECTIVE / OVERNIGHT EVENTS: Pt seen at the bedside.  Patient denies chest pain, SOB, abd pain, N/V, fever, chills, dysuria or any other complaints. All remainder ROS negative.     MEDICATIONS  (STANDING):  atorvastatin 80 milliGRAM(s) Oral at bedtime  budesonide  80 MICROgram(s)/formoterol 4.5 MICROgram(s) Inhaler 2 Puff(s) Inhalation two times a day  clotrimazole 1% Cream 1 Application(s) Topical two times a day  dextrose 5%. 1000 milliLiter(s) (100 mL/Hr) IV Continuous <Continuous>  dextrose 5%. 1000 milliLiter(s) (50 mL/Hr) IV Continuous <Continuous>  dextrose 50% Injectable 25 Gram(s) IV Push once  dextrose 50% Injectable 12.5 Gram(s) IV Push once  dextrose 50% Injectable 25 Gram(s) IV Push once  famotidine    Tablet 20 milliGRAM(s) Oral daily  furosemide   Injectable 40 milliGRAM(s) IV Push daily  glucagon  Injectable 1 milliGRAM(s) IntraMuscular once  heparin  Infusion.  Unit(s)/Hr (24 mL/Hr) IV Continuous <Continuous>  insulin glargine Injectable (LANTUS) 45 Unit(s) SubCutaneous at bedtime  insulin glargine Injectable (LANTUS) 45 Unit(s) SubCutaneous every morning  insulin lispro (ADMELOG) corrective regimen sliding scale   SubCutaneous three times a day before meals  insulin lispro Injectable (ADMELOG) 35 Unit(s) SubCutaneous three times a day before meals  ipratropium    for Nebulization 500 MICROGram(s) Nebulizer every 6 hours  lactulose Syrup 20 Gram(s) Oral daily  levalbuterol Inhalation 0.63 milliGRAM(s) Inhalation every 6 hours  methylPREDNISolone sodium succinate Injectable 40 milliGRAM(s) IV Push every 8 hours  metoprolol tartrate 50 milliGRAM(s) Oral every 8 hours  nystatin Powder 1 Application(s) Topical two times a day  oxyCODONE  ER Tablet 60 milliGRAM(s) Oral every 12 hours  piperacillin/tazobactam IVPB.. 3.375 Gram(s) IV Intermittent every 8 hours  polyethylene glycol 3350 17 Gram(s) Oral daily  senna 2 Tablet(s) Oral at bedtime    MEDICATIONS  (PRN):  acetaminophen     Tablet .. 650 milliGRAM(s) Oral every 6 hours PRN Temp greater or equal to 38C (100.4F), Mild Pain (1 - 3)  albuterol    90 MICROgram(s) HFA Inhaler 2 Puff(s) Inhalation every 2 hours PRN Shortness of Breath and/or Wheezing  ALPRAZolam 0.25 milliGRAM(s) Oral every 8 hours PRN for anxiety/Sleep  dextrose Oral Gel 15 Gram(s) Oral once PRN Blood Glucose LESS THAN 70 milliGRAM(s)/deciliter  diphenhydrAMINE 25 milliGRAM(s) Oral every 6 hours PRN Rash and/or Itching  heparin   Injectable 5000 Unit(s) IV Push every 6 hours PRN For aPTT between 40 - 57  heparin   Injectable 56863 Unit(s) IV Push every 6 hours PRN For aPTT less than 40  hydrALAZINE Injectable 10 milliGRAM(s) IV Push every 4 hours PRN for systolic bp > 160  HYDROmorphone  Injectable 1 milliGRAM(s) IV Push every 4 hours PRN breakthrough  pain  HYDROmorphone  Injectable 0.5 milliGRAM(s) IV Push every 6 hours PRN Severe Pain (7 - 10)  melatonin 3 milliGRAM(s) Oral at bedtime PRN Insomnia  metoprolol tartrate Injectable 5 milliGRAM(s) IV Push every 6 hours PRN heart rate sustaining greater than 130  ondansetron Injectable 4 milliGRAM(s) IV Push every 8 hours PRN Nausea and/or Vomiting  oxyCODONE    IR 5 milliGRAM(s) Oral every 6 hours PRN Moderate Pain (4 - 6)      I&O's Summary    23 Dec 2023 07:01  -  24 Dec 2023 07:00  --------------------------------------------------------  IN: 452 mL / OUT: 1850 mL / NET: -1398 mL    24 Dec 2023 07:01  -  24 Dec 2023 13:35  --------------------------------------------------------  IN: 69 mL / OUT: 1200 mL / NET: -1131 mL        PHYSICAL EXAM:  Vital Signs Last 24 Hrs  T(C): 37 (24 Dec 2023 10:00), Max: 37.6 (23 Dec 2023 14:00)  T(F): 98.6 (24 Dec 2023 10:00), Max: 99.7 (23 Dec 2023 14:00)  HR: 98 (24 Dec 2023 10:00) (79 - 123)  BP: 114/46 (24 Dec 2023 10:00) (104/51 - 146/71)  BP(mean): 61 (24 Dec 2023 10:00) (60 - 94)  RR: 20 (24 Dec 2023 10:00) (19 - 22)  SpO2: 96% (24 Dec 2023 10:00) (92% - 98%)    Parameters below as of 24 Dec 2023 10:00  Patient On (Oxygen Delivery Method): nasal cannula, high flow  O2 Flow (L/min): 30  O2 Concentration (%): 40      GENERAL: not in acute distress  HEENT:  Clear conjunctiva, PERRL,  EOMI, moist oral mucosa no pharyngeal injection or exudates, no cervical LAD  RESP:  Non-labored breathing pattern, lungs clear to ausculation, no wheezes or crackles appreciated  CV: Regular rate and rhythm, no murmurs appreciated, no lower extremity edema, peripheral pulses are 2+ bilaterally  GI: Soft, non-tender, non-distended  NEURO: Awake, alert, conversant, upper and lower extremity strength and light touch sensation grossly intact  PSYCH: Calm, cooperative, A&Ox3  SKIN: No rash or lesions, warm and dry      LABS:                        15.0   22.84 )-----------( 297      ( 24 Dec 2023 08:41 )             46.4     12-24    133<L>  |  89<L>  |  70.0<H>  ----------------------------<  405<H>  5.0   |  31.0<H>  |  1.74<H>    Ca    9.0      24 Dec 2023 08:41  Phos  3.0     12-23  Mg     2.6     12-24      PTT - ( 24 Dec 2023 08:41 )  PTT:165.4 sec      Urinalysis Basic - ( 24 Dec 2023 08:41 )    Color: x / Appearance: x / SG: x / pH: x  Gluc: 405 mg/dL / Ketone: x  / Bili: x / Urobili: x   Blood: x / Protein: x / Nitrite: x   Leuk Esterase: x / RBC: x / WBC x   Sq Epi: x / Non Sq Epi: x / Bacteria: x        CAPILLARY BLOOD GLUCOSE      POCT Blood Glucose.: 268 mg/dL (24 Dec 2023 13:17)  POCT Blood Glucose.: 335 mg/dL (24 Dec 2023 08:57)  POCT Blood Glucose.: 331 mg/dL (24 Dec 2023 05:55)  POCT Blood Glucose.: 337 mg/dL (24 Dec 2023 05:54)  POCT Blood Glucose.: 227 mg/dL (23 Dec 2023 22:05)  POCT Blood Glucose.: 196 mg/dL (23 Dec 2023 18:18)  POCT Blood Glucose.: 291 mg/dL (23 Dec 2023 13:59)      IMAGING:                                   [facility] Division of Medicine    SUBJECTIVE / OVERNIGHT EVENTS: Pt seen at the bedside.  Patient denies chest pain, SOB, abd pain, N/V, fever, chills, dysuria or any other complaints. All remainder ROS negative.     MEDICATIONS  (STANDING):  atorvastatin 80 milliGRAM(s) Oral at bedtime  budesonide  80 MICROgram(s)/formoterol 4.5 MICROgram(s) Inhaler 2 Puff(s) Inhalation two times a day  clotrimazole 1% Cream 1 Application(s) Topical two times a day  dextrose 5%. 1000 milliLiter(s) (100 mL/Hr) IV Continuous <Continuous>  dextrose 5%. 1000 milliLiter(s) (50 mL/Hr) IV Continuous <Continuous>  dextrose 50% Injectable 25 Gram(s) IV Push once  dextrose 50% Injectable 12.5 Gram(s) IV Push once  dextrose 50% Injectable 25 Gram(s) IV Push once  famotidine    Tablet 20 milliGRAM(s) Oral daily  furosemide   Injectable 40 milliGRAM(s) IV Push daily  glucagon  Injectable 1 milliGRAM(s) IntraMuscular once  heparin  Infusion.  Unit(s)/Hr (24 mL/Hr) IV Continuous <Continuous>  insulin glargine Injectable (LANTUS) 45 Unit(s) SubCutaneous at bedtime  insulin glargine Injectable (LANTUS) 45 Unit(s) SubCutaneous every morning  insulin lispro (ADMELOG) corrective regimen sliding scale   SubCutaneous three times a day before meals  insulin lispro Injectable (ADMELOG) 35 Unit(s) SubCutaneous three times a day before meals  ipratropium    for Nebulization 500 MICROGram(s) Nebulizer every 6 hours  lactulose Syrup 20 Gram(s) Oral daily  levalbuterol Inhalation 0.63 milliGRAM(s) Inhalation every 6 hours  methylPREDNISolone sodium succinate Injectable 40 milliGRAM(s) IV Push every 8 hours  metoprolol tartrate 50 milliGRAM(s) Oral every 8 hours  nystatin Powder 1 Application(s) Topical two times a day  oxyCODONE  ER Tablet 60 milliGRAM(s) Oral every 12 hours  piperacillin/tazobactam IVPB.. 3.375 Gram(s) IV Intermittent every 8 hours  polyethylene glycol 3350 17 Gram(s) Oral daily  senna 2 Tablet(s) Oral at bedtime    MEDICATIONS  (PRN):  acetaminophen     Tablet .. 650 milliGRAM(s) Oral every 6 hours PRN Temp greater or equal to 38C (100.4F), Mild Pain (1 - 3)  albuterol    90 MICROgram(s) HFA Inhaler 2 Puff(s) Inhalation every 2 hours PRN Shortness of Breath and/or Wheezing  ALPRAZolam 0.25 milliGRAM(s) Oral every 8 hours PRN for anxiety/Sleep  dextrose Oral Gel 15 Gram(s) Oral once PRN Blood Glucose LESS THAN 70 milliGRAM(s)/deciliter  diphenhydrAMINE 25 milliGRAM(s) Oral every 6 hours PRN Rash and/or Itching  heparin   Injectable 5000 Unit(s) IV Push every 6 hours PRN For aPTT between 40 - 57  heparin   Injectable 01373 Unit(s) IV Push every 6 hours PRN For aPTT less than 40  hydrALAZINE Injectable 10 milliGRAM(s) IV Push every 4 hours PRN for systolic bp > 160  HYDROmorphone  Injectable 1 milliGRAM(s) IV Push every 4 hours PRN breakthrough  pain  HYDROmorphone  Injectable 0.5 milliGRAM(s) IV Push every 6 hours PRN Severe Pain (7 - 10)  melatonin 3 milliGRAM(s) Oral at bedtime PRN Insomnia  metoprolol tartrate Injectable 5 milliGRAM(s) IV Push every 6 hours PRN heart rate sustaining greater than 130  ondansetron Injectable 4 milliGRAM(s) IV Push every 8 hours PRN Nausea and/or Vomiting  oxyCODONE    IR 5 milliGRAM(s) Oral every 6 hours PRN Moderate Pain (4 - 6)      I&O's Summary    23 Dec 2023 07:01  -  24 Dec 2023 07:00  --------------------------------------------------------  IN: 452 mL / OUT: 1850 mL / NET: -1398 mL    24 Dec 2023 07:01  -  24 Dec 2023 13:35  --------------------------------------------------------  IN: 69 mL / OUT: 1200 mL / NET: -1131 mL        PHYSICAL EXAM:  Vital Signs Last 24 Hrs  T(C): 37 (24 Dec 2023 10:00), Max: 37.6 (23 Dec 2023 14:00)  T(F): 98.6 (24 Dec 2023 10:00), Max: 99.7 (23 Dec 2023 14:00)  HR: 98 (24 Dec 2023 10:00) (79 - 123)  BP: 114/46 (24 Dec 2023 10:00) (104/51 - 146/71)  BP(mean): 61 (24 Dec 2023 10:00) (60 - 94)  RR: 20 (24 Dec 2023 10:00) (19 - 22)  SpO2: 96% (24 Dec 2023 10:00) (92% - 98%)    Parameters below as of 24 Dec 2023 10:00  Patient On (Oxygen Delivery Method): nasal cannula, high flow  O2 Flow (L/min): 30  O2 Concentration (%): 40      GENERAL: not in acute distress  HEENT:  Clear conjunctiva, PERRL,  EOMI, moist oral mucosa no pharyngeal injection or exudates, no cervical LAD  RESP:  Non-labored breathing pattern, lungs clear to ausculation, no wheezes or crackles appreciated  CV: Regular rate and rhythm, no murmurs appreciated, no lower extremity edema, peripheral pulses are 2+ bilaterally  GI: Soft, non-tender, non-distended  NEURO: Awake, alert, conversant, upper and lower extremity strength and light touch sensation grossly intact  PSYCH: Calm, cooperative, A&Ox3  SKIN: No rash or lesions, warm and dry      LABS:                        15.0   22.84 )-----------( 297      ( 24 Dec 2023 08:41 )             46.4     12-24    133<L>  |  89<L>  |  70.0<H>  ----------------------------<  405<H>  5.0   |  31.0<H>  |  1.74<H>    Ca    9.0      24 Dec 2023 08:41  Phos  3.0     12-23  Mg     2.6     12-24      PTT - ( 24 Dec 2023 08:41 )  PTT:165.4 sec      Urinalysis Basic - ( 24 Dec 2023 08:41 )    Color: x / Appearance: x / SG: x / pH: x  Gluc: 405 mg/dL / Ketone: x  / Bili: x / Urobili: x   Blood: x / Protein: x / Nitrite: x   Leuk Esterase: x / RBC: x / WBC x   Sq Epi: x / Non Sq Epi: x / Bacteria: x        CAPILLARY BLOOD GLUCOSE      POCT Blood Glucose.: 268 mg/dL (24 Dec 2023 13:17)  POCT Blood Glucose.: 335 mg/dL (24 Dec 2023 08:57)  POCT Blood Glucose.: 331 mg/dL (24 Dec 2023 05:55)  POCT Blood Glucose.: 337 mg/dL (24 Dec 2023 05:54)  POCT Blood Glucose.: 227 mg/dL (23 Dec 2023 22:05)  POCT Blood Glucose.: 196 mg/dL (23 Dec 2023 18:18)  POCT Blood Glucose.: 291 mg/dL (23 Dec 2023 13:59)      IMAGING:     [facility] Division of Medicine    SUBJECTIVE / OVERNIGHT EVENTS: Pt seen at the bedside.  Patient denies chest pain, SOB, abd pain, N/V, fever, chills, dysuria or any other complaints. All remainder ROS negative.     MEDICATIONS  (STANDING):  atorvastatin 80 milliGRAM(s) Oral at bedtime  budesonide  80 MICROgram(s)/formoterol 4.5 MICROgram(s) Inhaler 2 Puff(s) Inhalation two times a day  clotrimazole 1% Cream 1 Application(s) Topical two times a day  dextrose 5%. 1000 milliLiter(s) (100 mL/Hr) IV Continuous <Continuous>  dextrose 5%. 1000 milliLiter(s) (50 mL/Hr) IV Continuous <Continuous>  dextrose 50% Injectable 25 Gram(s) IV Push once  dextrose 50% Injectable 12.5 Gram(s) IV Push once  dextrose 50% Injectable 25 Gram(s) IV Push once  famotidine    Tablet 20 milliGRAM(s) Oral daily  furosemide   Injectable 40 milliGRAM(s) IV Push daily  glucagon  Injectable 1 milliGRAM(s) IntraMuscular once  heparin  Infusion.  Unit(s)/Hr (24 mL/Hr) IV Continuous <Continuous>  insulin glargine Injectable (LANTUS) 45 Unit(s) SubCutaneous at bedtime  insulin glargine Injectable (LANTUS) 45 Unit(s) SubCutaneous every morning  insulin lispro (ADMELOG) corrective regimen sliding scale   SubCutaneous three times a day before meals  insulin lispro Injectable (ADMELOG) 35 Unit(s) SubCutaneous three times a day before meals  ipratropium    for Nebulization 500 MICROGram(s) Nebulizer every 6 hours  lactulose Syrup 20 Gram(s) Oral daily  levalbuterol Inhalation 0.63 milliGRAM(s) Inhalation every 6 hours  methylPREDNISolone sodium succinate Injectable 40 milliGRAM(s) IV Push every 8 hours  metoprolol tartrate 50 milliGRAM(s) Oral every 8 hours  nystatin Powder 1 Application(s) Topical two times a day  oxyCODONE  ER Tablet 60 milliGRAM(s) Oral every 12 hours  piperacillin/tazobactam IVPB.. 3.375 Gram(s) IV Intermittent every 8 hours  polyethylene glycol 3350 17 Gram(s) Oral daily  senna 2 Tablet(s) Oral at bedtime    MEDICATIONS  (PRN):  acetaminophen     Tablet .. 650 milliGRAM(s) Oral every 6 hours PRN Temp greater or equal to 38C (100.4F), Mild Pain (1 - 3)  albuterol    90 MICROgram(s) HFA Inhaler 2 Puff(s) Inhalation every 2 hours PRN Shortness of Breath and/or Wheezing  ALPRAZolam 0.25 milliGRAM(s) Oral every 8 hours PRN for anxiety/Sleep  dextrose Oral Gel 15 Gram(s) Oral once PRN Blood Glucose LESS THAN 70 milliGRAM(s)/deciliter  diphenhydrAMINE 25 milliGRAM(s) Oral every 6 hours PRN Rash and/or Itching  heparin   Injectable 5000 Unit(s) IV Push every 6 hours PRN For aPTT between 40 - 57  heparin   Injectable 53578 Unit(s) IV Push every 6 hours PRN For aPTT less than 40  hydrALAZINE Injectable 10 milliGRAM(s) IV Push every 4 hours PRN for systolic bp > 160  HYDROmorphone  Injectable 1 milliGRAM(s) IV Push every 4 hours PRN breakthrough  pain  HYDROmorphone  Injectable 0.5 milliGRAM(s) IV Push every 6 hours PRN Severe Pain (7 - 10)  melatonin 3 milliGRAM(s) Oral at bedtime PRN Insomnia  metoprolol tartrate Injectable 5 milliGRAM(s) IV Push every 6 hours PRN heart rate sustaining greater than 130  ondansetron Injectable 4 milliGRAM(s) IV Push every 8 hours PRN Nausea and/or Vomiting  oxyCODONE    IR 5 milliGRAM(s) Oral every 6 hours PRN Moderate Pain (4 - 6)      I&O's Summary    23 Dec 2023 07:01  -  24 Dec 2023 07:00  --------------------------------------------------------  IN: 452 mL / OUT: 1850 mL / NET: -1398 mL    24 Dec 2023 07:01  -  24 Dec 2023 13:35  --------------------------------------------------------  IN: 69 mL / OUT: 1200 mL / NET: -1131 mL        PHYSICAL EXAM:  Vital Signs Last 24 Hrs  T(C): 37 (24 Dec 2023 10:00), Max: 37.6 (23 Dec 2023 14:00)  T(F): 98.6 (24 Dec 2023 10:00), Max: 99.7 (23 Dec 2023 14:00)  HR: 98 (24 Dec 2023 10:00) (79 - 123)  BP: 114/46 (24 Dec 2023 10:00) (104/51 - 146/71)  BP(mean): 61 (24 Dec 2023 10:00) (60 - 94)  RR: 20 (24 Dec 2023 10:00) (19 - 22)  SpO2: 96% (24 Dec 2023 10:00) (92% - 98%)    Parameters below as of 24 Dec 2023 10:00  Patient On (Oxygen Delivery Method): nasal cannula, high flow  O2 Flow (L/min): 30  O2 Concentration (%): 40      GENERAL: not in acute distress  HEENT:  Clear conjunctiva, PERRL,  EOMI, moist oral mucosa no pharyngeal injection or exudates, no cervical LAD  RESP:  Non-labored breathing pattern, lungs clear to ausculation, no wheezes or crackles appreciated  CV: Regular rate and rhythm, no murmurs appreciated, no lower extremity edema, peripheral pulses are 2+ bilaterally  GI: Soft, non-tender, non-distended  NEURO: Awake, alert, conversant, upper and lower extremity strength and light touch sensation grossly intact  PSYCH: Calm, cooperative, A&Ox3  SKIN: No rash or lesions, warm and dry      LABS:                        15.0   22.84 )-----------( 297      ( 24 Dec 2023 08:41 )             46.4     12-24    133<L>  |  89<L>  |  70.0<H>  ----------------------------<  405<H>  5.0   |  31.0<H>  |  1.74<H>    Ca    9.0      24 Dec 2023 08:41  Phos  3.0     12-23  Mg     2.6     12-24      PTT - ( 24 Dec 2023 08:41 )  PTT:165.4 sec      Urinalysis Basic - ( 24 Dec 2023 08:41 )    Color: x / Appearance: x / SG: x / pH: x  Gluc: 405 mg/dL / Ketone: x  / Bili: x / Urobili: x   Blood: x / Protein: x / Nitrite: x   Leuk Esterase: x / RBC: x / WBC x   Sq Epi: x / Non Sq Epi: x / Bacteria: x        CAPILLARY BLOOD GLUCOSE      POCT Blood Glucose.: 268 mg/dL (24 Dec 2023 13:17)  POCT Blood Glucose.: 335 mg/dL (24 Dec 2023 08:57)  POCT Blood Glucose.: 331 mg/dL (24 Dec 2023 05:55)  POCT Blood Glucose.: 337 mg/dL (24 Dec 2023 05:54)  POCT Blood Glucose.: 227 mg/dL (23 Dec 2023 22:05)  POCT Blood Glucose.: 196 mg/dL (23 Dec 2023 18:18)  POCT Blood Glucose.: 291 mg/dL (23 Dec 2023 13:59)      IMAGING:

## 2023-12-24 NOTE — PROGRESS NOTE ADULT - ASSESSMENT
69 yo female with HTN, HLD, DM2, HFpEF, CKD III, DVT, Uterine CA, COPD on home o2 who presented with complaints of shortness of breath. Patient reports that she has been sick all week and was recently started on Vantin and steroids while at the nursing home. Patient reports that her dyspnea just worsened and she told the nursing home to send her in for an evaluation. While in the ED, patient was placed on BIPAP and was given IV lasix with some improvement. RVP then results as RSV +. Patient had a CT scan which also showed pneumonia and pulmonary HTN. Admission to medicine was requested for further management.    #new afib:  echo w/ preserved EF  - EP consulted, no DCCV given resp issues  - cardiology following, plan for R/LHC when resp condition improves  - loaded with digoxin IV x1 then placed on cardizem ggt  - cardizem d/c'd   - inc loprssor to 50mg q8hr  - c/w heparin drip for [id number] 6     # Acute on chronic respiratory failure  # Acute HFpEF exacerbation with likely with Superimposed bacterial Pneumonia in setting of RSV  #Pulmonary hypertension  - WBC at 22 again today  - c/w zosyn (day 8)  - blood clx NGTD  - check sputum clx, repeat MRSA PCR  - c/w IV steroids given persistent wheezing  - levalbuterol and ipratropium q6hrs, symbicort BID   - c/w IV Lasix qd  - BIPAP qhs  - pulm following     # Leukocytosis  - may represent worsening PNA vs steroid induced  - c/w zosyn  - check sputum clx and repeat MRSA PCR  - clinically pt feels better, has been afebrile  - trend CBC and monitor     #Chronic pain  - continue home Pain meds of Oxycodone 60 mg q12h and Oxycodone 5mg q6h PRN    # hx of suspicion for PE. DVT  - Patient has empirically been treated for PE/DVT since August  - No scans noted to have PE/DVT  - duplex lower ext to without dvt   - ddimer negative. dc eliquis per cardiology   - c/w ppx heparin    # HLD  - Atorvastatin for rosuvastatin    # COPD  Steroids as above  Symbicort, Spiriva, albuterol  pulm following     #DM  #steroid induced hyperglycemia  - lantus/premeal insulin per endo  - lispro sliding scale    DVT prophylaxis: HSQ  Dispo: pending resp status optimization   69 yo female with HTN, HLD, DM2, HFpEF, CKD III, DVT, Uterine CA, COPD on home o2 who presented with complaints of shortness of breath. Patient reports that she has been sick all week and was recently started on Vantin and steroids while at the nursing home. Patient reports that her dyspnea just worsened and she told the nursing home to send her in for an evaluation. While in the ED, patient was placed on BIPAP and was given IV lasix with some improvement. RVP then results as RSV +. Patient had a CT scan which also showed pneumonia and pulmonary HTN. Admission to medicine was requested for further management.    #new afib:  echo w/ preserved EF  - EP consulted, no DCCV given resp issues  - cardiology following, plan for R/LHC when resp condition improves  - loaded with digoxin IV x1 then placed on cardizem ggt  - cardizem d/c'd   - inc loprssor to 50mg q8hr  - c/w heparin drip for [id number] 6     # Acute on chronic respiratory failure  # Acute HFpEF exacerbation with likely with Superimposed bacterial Pneumonia in setting of RSV  #Pulmonary hypertension  - WBC at 22 again today  - c/w zosyn (day 8)  - blood clx NGTD  - check sputum clx, repeat MRSA PCR  - c/w IV steroids given persistent wheezing  - levalbuterol and ipratropium q6hrs, symbicort BID   - c/w IV Lasix qd  - BIPAP qhs  - pulm following     # Leukocytosis  - may represent worsening PNA vs steroid induced  - c/w zosyn  - check sputum clx and repeat MRSA PCR  - clinically pt feels better, has been afebrile  - trend CBC and monitor     #Chronic pain  - continue home Pain meds of Oxycodone 60 mg q12h and Oxycodone 5mg q6h PRN    # hx of suspicion for PE. DVT  - Patient has empirically been treated for PE/DVT since August  - No scans noted to have PE/DVT  - duplex lower ext to without dvt   - ddimer negative. dc eliquis per cardiology   - c/w ppx heparin    # HLD  - Atorvastatin for rosuvastatin    # COPD  Steroids as above  Symbicort, Spiriva, albuterol  pulm following     #DM  #steroid induced hyperglycemia  - lantus/premeal insulin per endo  - lispro sliding scale    DVT prophylaxis: HSQ  Dispo: pending resp status optimization   69 yo female with HTN, HLD, DM2, HFpEF, CKD III, DVT, Uterine CA, COPD on home o2 who presented with complaints of shortness of breath. Patient reports that she has been sick all week and was recently started on Vantin and steroids while at the nursing home. Patient reports that her dyspnea just worsened and she told the nursing home to send her in for an evaluation. While in the ED, patient was placed on BIPAP and was given IV lasix with some improvement. RVP then results as RSV +. Patient had a CT scan which also showed pneumonia and pulmonary HTN. Admission to medicine was requested for further management.    #new afib:  echo w/ preserved EF  - EP consulted, no DCCV given resp issues  - cardiology following, plan for R/LHC when resp condition improves  - inc loprssor to 50mg q8hr  - c/w heparin drip for [id number] 6 until LHC with cardio    # Acute on chronic respiratory failure  # Acute HFpEF exacerbation with likely with Superimposed bacterial Pneumonia in setting of RSV  #Pulmonary hypertension  - WBC at 22 again today  - c/w zosyn (day 8)  - blood clx NGTD  - check sputum clx, repeat MRSA PCR  - c/w IV steroids 40mg q8hr, taper to q12hr tomorrow if remains stable   - levalbuterol and ipratropium q6hrs, symbicort BID   - c/w IV Lasix qd  - BIPAP qhs  - pulm following     # Leukocytosis  - may represent worsening PNA vs steroid induced  - c/w zosyn  - check sputum clx and repeat MRSA PCR  - clinically pt feels better, has been afebrile  - trend CBC and monitor     #Chronic pain  - continue home Pain meds of Oxycodone 60 mg q12h and Oxycodone 5mg q6h PRN    # hx of suspicion for PE. DVT  - Patient has empirically been treated for PE/DVT since August  - No scans noted to have PE/DVT  - duplex lower ext to without dvt   - ddimer negative. dc eliquis per cardiology   - c/w ppx heparin for afib    # HLD  - Atorvastatin for rosuvastatin    # COPD  - Steroids as above  - Symbicort, Spiriva, albuterol  - pulm following     #DM  #steroid induced hyperglycemia  - lantus/premeal insulin per endo  - lispro sliding scale    DVT prophylaxis: heparin ggt  Dispo: pending resp status optimization   69 yo female with HTN, HLD, DM2, HFpEF, CKD III, DVT, Uterine CA, COPD on home o2 who presented with complaints of shortness of breath. Patient reports that she has been sick all week and was recently started on Vantin and steroids while at the nursing home. Patient reports that her dyspnea just worsened and she told the nursing home to send her in for an evaluation. While in the ED, patient was placed on BIPAP and was given IV lasix with some improvement. RVP then results as RSV +. Patient had a CT scan which also showed pneumonia and pulmonary HTN. Admission to medicine was requested for further management.    #new afib:  echo w/ preserved EF  - EP consulted, no DCCV given resp issues  - cardiology following, plan for R/LHC when resp condition improves  - inc loprssor to 50mg q8hr  - c/w heparin drip for [id number] 6 until LHC with cardio    # Acute on chronic respiratory failure  # Acute HFpEF exacerbation with likely with Superimposed bacterial Pneumonia in setting of RSV  #Pulmonary hypertension  - WBC at 22 again today  - c/w zosyn (day 8)  - blood clx NGTD  - check sputum clx, repeat MRSA PCR  - c/w IV steroids 40mg q8hr, taper to q12hr tomorrow if remains stable   - levalbuterol and ipratropium q6hrs, symbicort BID   - c/w IV Lasix qd  - BIPAP qhs  - pulm following     # Leukocytosis  - may represent worsening PNA vs steroid induced  - c/w zosyn  - check sputum clx and repeat MRSA PCR  - clinically pt feels better, has been afebrile  - trend CBC and monitor     #Chronic pain  - continue home Pain meds of Oxycodone 60 mg q12h and Oxycodone 5mg q6h PRN    # hx of suspicion for PE. DVT  - Patient has empirically been treated for PE/DVT since August  - No scans noted to have PE/DVT  - duplex lower ext to without dvt   - ddimer negative. dc eliquis per cardiology   - c/w ppx heparin for afib    # HLD  - Atorvastatin for rosuvastatin    # COPD  - Steroids as above  - Symbicort, Spiriva, albuterol  - pulm following     #DM  #steroid induced hyperglycemia  - lantus/premeal insulin per endo  - lispro sliding scale    DVT prophylaxis: heparin ggt  Dispo: pending resp status optimization

## 2023-12-25 LAB
ALBUMIN SERPL ELPH-MCNC: 3.6 G/DL — SIGNIFICANT CHANGE UP (ref 3.3–5.2)
ALBUMIN SERPL ELPH-MCNC: 3.6 G/DL — SIGNIFICANT CHANGE UP (ref 3.3–5.2)
ALP SERPL-CCNC: 114 U/L — SIGNIFICANT CHANGE UP (ref 40–120)
ALP SERPL-CCNC: 114 U/L — SIGNIFICANT CHANGE UP (ref 40–120)
ALT FLD-CCNC: 79 U/L — HIGH
ALT FLD-CCNC: 79 U/L — HIGH
ANION GAP SERPL CALC-SCNC: 13 MMOL/L — SIGNIFICANT CHANGE UP (ref 5–17)
ANION GAP SERPL CALC-SCNC: 13 MMOL/L — SIGNIFICANT CHANGE UP (ref 5–17)
APTT BLD: 108.7 SEC — HIGH (ref 24.5–35.6)
APTT BLD: 108.7 SEC — HIGH (ref 24.5–35.6)
APTT BLD: 43.7 SEC — HIGH (ref 24.5–35.6)
APTT BLD: 43.7 SEC — HIGH (ref 24.5–35.6)
APTT BLD: 67.4 SEC — HIGH (ref 24.5–35.6)
APTT BLD: 67.4 SEC — HIGH (ref 24.5–35.6)
AST SERPL-CCNC: 25 U/L — SIGNIFICANT CHANGE UP
AST SERPL-CCNC: 25 U/L — SIGNIFICANT CHANGE UP
BASOPHILS # BLD AUTO: 0 K/UL — SIGNIFICANT CHANGE UP (ref 0–0.2)
BASOPHILS # BLD AUTO: 0 K/UL — SIGNIFICANT CHANGE UP (ref 0–0.2)
BASOPHILS NFR BLD AUTO: 0 % — SIGNIFICANT CHANGE UP (ref 0–2)
BASOPHILS NFR BLD AUTO: 0 % — SIGNIFICANT CHANGE UP (ref 0–2)
BILIRUB DIRECT SERPL-MCNC: 0.4 MG/DL — HIGH (ref 0–0.3)
BILIRUB DIRECT SERPL-MCNC: 0.4 MG/DL — HIGH (ref 0–0.3)
BILIRUB INDIRECT FLD-MCNC: 0.5 MG/DL — SIGNIFICANT CHANGE UP (ref 0.2–1)
BILIRUB INDIRECT FLD-MCNC: 0.5 MG/DL — SIGNIFICANT CHANGE UP (ref 0.2–1)
BILIRUB SERPL-MCNC: 0.9 MG/DL — SIGNIFICANT CHANGE UP (ref 0.4–2)
BILIRUB SERPL-MCNC: 0.9 MG/DL — SIGNIFICANT CHANGE UP (ref 0.4–2)
BUN SERPL-MCNC: 73.3 MG/DL — HIGH (ref 8–20)
BUN SERPL-MCNC: 73.3 MG/DL — HIGH (ref 8–20)
CALCIUM SERPL-MCNC: 9.1 MG/DL — SIGNIFICANT CHANGE UP (ref 8.4–10.5)
CALCIUM SERPL-MCNC: 9.1 MG/DL — SIGNIFICANT CHANGE UP (ref 8.4–10.5)
CHLORIDE SERPL-SCNC: 88 MMOL/L — LOW (ref 96–108)
CHLORIDE SERPL-SCNC: 88 MMOL/L — LOW (ref 96–108)
CO2 SERPL-SCNC: 32 MMOL/L — HIGH (ref 22–29)
CO2 SERPL-SCNC: 32 MMOL/L — HIGH (ref 22–29)
CREAT SERPL-MCNC: 1.85 MG/DL — HIGH (ref 0.5–1.3)
CREAT SERPL-MCNC: 1.85 MG/DL — HIGH (ref 0.5–1.3)
EGFR: 29 ML/MIN/1.73M2 — LOW
EGFR: 29 ML/MIN/1.73M2 — LOW
EOSINOPHIL # BLD AUTO: 0.23 K/UL — SIGNIFICANT CHANGE UP (ref 0–0.5)
EOSINOPHIL # BLD AUTO: 0.23 K/UL — SIGNIFICANT CHANGE UP (ref 0–0.5)
EOSINOPHIL NFR BLD AUTO: 0.9 % — SIGNIFICANT CHANGE UP (ref 0–6)
EOSINOPHIL NFR BLD AUTO: 0.9 % — SIGNIFICANT CHANGE UP (ref 0–6)
GIANT PLATELETS BLD QL SMEAR: PRESENT — SIGNIFICANT CHANGE UP
GIANT PLATELETS BLD QL SMEAR: PRESENT — SIGNIFICANT CHANGE UP
GLUCOSE BLDC GLUCOMTR-MCNC: 306 MG/DL — HIGH (ref 70–99)
GLUCOSE BLDC GLUCOMTR-MCNC: 306 MG/DL — HIGH (ref 70–99)
GLUCOSE BLDC GLUCOMTR-MCNC: 317 MG/DL — HIGH (ref 70–99)
GLUCOSE BLDC GLUCOMTR-MCNC: 317 MG/DL — HIGH (ref 70–99)
GLUCOSE BLDC GLUCOMTR-MCNC: 325 MG/DL — HIGH (ref 70–99)
GLUCOSE BLDC GLUCOMTR-MCNC: 325 MG/DL — HIGH (ref 70–99)
GLUCOSE BLDC GLUCOMTR-MCNC: 393 MG/DL — HIGH (ref 70–99)
GLUCOSE BLDC GLUCOMTR-MCNC: 393 MG/DL — HIGH (ref 70–99)
GLUCOSE BLDC GLUCOMTR-MCNC: 416 MG/DL — HIGH (ref 70–99)
GLUCOSE BLDC GLUCOMTR-MCNC: 416 MG/DL — HIGH (ref 70–99)
GLUCOSE SERPL-MCNC: 457 MG/DL — CRITICAL HIGH (ref 70–99)
GLUCOSE SERPL-MCNC: 457 MG/DL — CRITICAL HIGH (ref 70–99)
HCT VFR BLD CALC: 46.2 % — HIGH (ref 34.5–45)
HCT VFR BLD CALC: 46.2 % — HIGH (ref 34.5–45)
HGB BLD-MCNC: 14.9 G/DL — SIGNIFICANT CHANGE UP (ref 11.5–15.5)
HGB BLD-MCNC: 14.9 G/DL — SIGNIFICANT CHANGE UP (ref 11.5–15.5)
LYMPHOCYTES # BLD AUTO: 1.57 K/UL — SIGNIFICANT CHANGE UP (ref 1–3.3)
LYMPHOCYTES # BLD AUTO: 1.57 K/UL — SIGNIFICANT CHANGE UP (ref 1–3.3)
LYMPHOCYTES # BLD AUTO: 6.1 % — LOW (ref 13–44)
LYMPHOCYTES # BLD AUTO: 6.1 % — LOW (ref 13–44)
MANUAL SMEAR VERIFICATION: SIGNIFICANT CHANGE UP
MANUAL SMEAR VERIFICATION: SIGNIFICANT CHANGE UP
MCHC RBC-ENTMCNC: 28.7 PG — SIGNIFICANT CHANGE UP (ref 27–34)
MCHC RBC-ENTMCNC: 28.7 PG — SIGNIFICANT CHANGE UP (ref 27–34)
MCHC RBC-ENTMCNC: 32.3 GM/DL — SIGNIFICANT CHANGE UP (ref 32–36)
MCHC RBC-ENTMCNC: 32.3 GM/DL — SIGNIFICANT CHANGE UP (ref 32–36)
MCV RBC AUTO: 88.8 FL — SIGNIFICANT CHANGE UP (ref 80–100)
MCV RBC AUTO: 88.8 FL — SIGNIFICANT CHANGE UP (ref 80–100)
METAMYELOCYTES # FLD: 2.6 % — HIGH (ref 0–0)
METAMYELOCYTES # FLD: 2.6 % — HIGH (ref 0–0)
MONOCYTES # BLD AUTO: 1.11 K/UL — HIGH (ref 0–0.9)
MONOCYTES # BLD AUTO: 1.11 K/UL — HIGH (ref 0–0.9)
MONOCYTES NFR BLD AUTO: 4.3 % — SIGNIFICANT CHANGE UP (ref 2–14)
MONOCYTES NFR BLD AUTO: 4.3 % — SIGNIFICANT CHANGE UP (ref 2–14)
MRSA PCR RESULT.: SIGNIFICANT CHANGE UP
MRSA PCR RESULT.: SIGNIFICANT CHANGE UP
MYELOCYTES NFR BLD: 1.7 % — HIGH (ref 0–0)
MYELOCYTES NFR BLD: 1.7 % — HIGH (ref 0–0)
NEUTROPHILS # BLD AUTO: 21.06 K/UL — HIGH (ref 1.8–7.4)
NEUTROPHILS # BLD AUTO: 21.06 K/UL — HIGH (ref 1.8–7.4)
NEUTROPHILS NFR BLD AUTO: 80.9 % — HIGH (ref 43–77)
NEUTROPHILS NFR BLD AUTO: 80.9 % — HIGH (ref 43–77)
NEUTS BAND # BLD: 0.9 % — SIGNIFICANT CHANGE UP (ref 0–8)
NEUTS BAND # BLD: 0.9 % — SIGNIFICANT CHANGE UP (ref 0–8)
OVALOCYTES BLD QL SMEAR: SLIGHT — SIGNIFICANT CHANGE UP
OVALOCYTES BLD QL SMEAR: SLIGHT — SIGNIFICANT CHANGE UP
PLAT MORPH BLD: NORMAL — SIGNIFICANT CHANGE UP
PLAT MORPH BLD: NORMAL — SIGNIFICANT CHANGE UP
PLATELET # BLD AUTO: 313 K/UL — SIGNIFICANT CHANGE UP (ref 150–400)
PLATELET # BLD AUTO: 313 K/UL — SIGNIFICANT CHANGE UP (ref 150–400)
POIKILOCYTOSIS BLD QL AUTO: SLIGHT — SIGNIFICANT CHANGE UP
POIKILOCYTOSIS BLD QL AUTO: SLIGHT — SIGNIFICANT CHANGE UP
POLYCHROMASIA BLD QL SMEAR: SLIGHT — SIGNIFICANT CHANGE UP
POLYCHROMASIA BLD QL SMEAR: SLIGHT — SIGNIFICANT CHANGE UP
POTASSIUM SERPL-MCNC: 4.8 MMOL/L — SIGNIFICANT CHANGE UP (ref 3.5–5.3)
POTASSIUM SERPL-MCNC: 4.8 MMOL/L — SIGNIFICANT CHANGE UP (ref 3.5–5.3)
POTASSIUM SERPL-SCNC: 4.8 MMOL/L — SIGNIFICANT CHANGE UP (ref 3.5–5.3)
POTASSIUM SERPL-SCNC: 4.8 MMOL/L — SIGNIFICANT CHANGE UP (ref 3.5–5.3)
PROT SERPL-MCNC: 6.5 G/DL — LOW (ref 6.6–8.7)
PROT SERPL-MCNC: 6.5 G/DL — LOW (ref 6.6–8.7)
RBC # BLD: 5.2 M/UL — SIGNIFICANT CHANGE UP (ref 3.8–5.2)
RBC # BLD: 5.2 M/UL — SIGNIFICANT CHANGE UP (ref 3.8–5.2)
RBC # FLD: 13.2 % — SIGNIFICANT CHANGE UP (ref 10.3–14.5)
RBC # FLD: 13.2 % — SIGNIFICANT CHANGE UP (ref 10.3–14.5)
RBC BLD AUTO: ABNORMAL
RBC BLD AUTO: ABNORMAL
S AUREUS DNA NOSE QL NAA+PROBE: SIGNIFICANT CHANGE UP
S AUREUS DNA NOSE QL NAA+PROBE: SIGNIFICANT CHANGE UP
SODIUM SERPL-SCNC: 133 MMOL/L — LOW (ref 135–145)
SODIUM SERPL-SCNC: 133 MMOL/L — LOW (ref 135–145)
VARIANT LYMPHS # BLD: 2.6 % — SIGNIFICANT CHANGE UP (ref 0–6)
VARIANT LYMPHS # BLD: 2.6 % — SIGNIFICANT CHANGE UP (ref 0–6)
WBC # BLD: 25.74 K/UL — HIGH (ref 3.8–10.5)
WBC # BLD: 25.74 K/UL — HIGH (ref 3.8–10.5)
WBC # FLD AUTO: 25.74 K/UL — HIGH (ref 3.8–10.5)
WBC # FLD AUTO: 25.74 K/UL — HIGH (ref 3.8–10.5)

## 2023-12-25 PROCEDURE — 99234 HOSP IP/OBS SM DT SF/LOW 45: CPT

## 2023-12-25 PROCEDURE — 99232 SBSQ HOSP IP/OBS MODERATE 35: CPT

## 2023-12-25 PROCEDURE — 99233 SBSQ HOSP IP/OBS HIGH 50: CPT

## 2023-12-25 RX ORDER — INSULIN GLARGINE 100 [IU]/ML
50 INJECTION, SOLUTION SUBCUTANEOUS AT BEDTIME
Refills: 0 | Status: DISCONTINUED | OUTPATIENT
Start: 2023-12-25 | End: 2023-12-29

## 2023-12-25 RX ORDER — FUROSEMIDE 40 MG
40 TABLET ORAL ONCE
Refills: 0 | Status: COMPLETED | OUTPATIENT
Start: 2023-12-25 | End: 2023-12-25

## 2023-12-25 RX ORDER — INSULIN GLARGINE 100 [IU]/ML
50 INJECTION, SOLUTION SUBCUTANEOUS EVERY MORNING
Refills: 0 | Status: DISCONTINUED | OUTPATIENT
Start: 2023-12-25 | End: 2023-12-29

## 2023-12-25 RX ADMIN — HYDROMORPHONE HYDROCHLORIDE 1 MILLIGRAM(S): 2 INJECTION INTRAMUSCULAR; INTRAVENOUS; SUBCUTANEOUS at 00:33

## 2023-12-25 RX ADMIN — Medication 35 UNIT(S): at 13:56

## 2023-12-25 RX ADMIN — HEPARIN SODIUM 1100 UNIT(S)/HR: 5000 INJECTION INTRAVENOUS; SUBCUTANEOUS at 19:21

## 2023-12-25 RX ADMIN — OXYCODONE HYDROCHLORIDE 60 MILLIGRAM(S): 5 TABLET ORAL at 18:12

## 2023-12-25 RX ADMIN — INSULIN GLARGINE 45 UNIT(S): 100 INJECTION, SOLUTION SUBCUTANEOUS at 09:15

## 2023-12-25 RX ADMIN — Medication 500 MICROGRAM(S): at 21:00

## 2023-12-25 RX ADMIN — FAMOTIDINE 20 MILLIGRAM(S): 10 INJECTION INTRAVENOUS at 13:54

## 2023-12-25 RX ADMIN — Medication 0.25 MILLIGRAM(S): at 03:05

## 2023-12-25 RX ADMIN — Medication 50 MILLIGRAM(S): at 05:40

## 2023-12-25 RX ADMIN — HEPARIN SODIUM 800 UNIT(S)/HR: 5000 INJECTION INTRAVENOUS; SUBCUTANEOUS at 11:15

## 2023-12-25 RX ADMIN — BUDESONIDE AND FORMOTEROL FUMARATE DIHYDRATE 2 PUFF(S): 160; 4.5 AEROSOL RESPIRATORY (INHALATION) at 09:32

## 2023-12-25 RX ADMIN — Medication 500 MICROGRAM(S): at 15:15

## 2023-12-25 RX ADMIN — Medication 35 UNIT(S): at 18:12

## 2023-12-25 RX ADMIN — Medication 1 APPLICATION(S): at 05:41

## 2023-12-25 RX ADMIN — POLYETHYLENE GLYCOL 3350 17 GRAM(S): 17 POWDER, FOR SOLUTION ORAL at 13:55

## 2023-12-25 RX ADMIN — HEPARIN SODIUM 800 UNIT(S)/HR: 5000 INJECTION INTRAVENOUS; SUBCUTANEOUS at 07:17

## 2023-12-25 RX ADMIN — Medication 10: at 13:57

## 2023-12-25 RX ADMIN — Medication 40 MILLIGRAM(S): at 22:29

## 2023-12-25 RX ADMIN — ATORVASTATIN CALCIUM 80 MILLIGRAM(S): 80 TABLET, FILM COATED ORAL at 22:29

## 2023-12-25 RX ADMIN — HEPARIN SODIUM 5000 UNIT(S): 5000 INJECTION INTRAVENOUS; SUBCUTANEOUS at 19:20

## 2023-12-25 RX ADMIN — Medication 0.25 MILLIGRAM(S): at 23:35

## 2023-12-25 RX ADMIN — OXYCODONE HYDROCHLORIDE 5 MILLIGRAM(S): 5 TABLET ORAL at 22:29

## 2023-12-25 RX ADMIN — LEVALBUTEROL 0.63 MILLIGRAM(S): 1.25 SOLUTION, CONCENTRATE RESPIRATORY (INHALATION) at 05:12

## 2023-12-25 RX ADMIN — Medication 40 MILLIGRAM(S): at 05:41

## 2023-12-25 RX ADMIN — OXYCODONE HYDROCHLORIDE 5 MILLIGRAM(S): 5 TABLET ORAL at 10:45

## 2023-12-25 RX ADMIN — Medication 500 MICROGRAM(S): at 09:32

## 2023-12-25 RX ADMIN — NYSTATIN CREAM 1 APPLICATION(S): 100000 CREAM TOPICAL at 18:58

## 2023-12-25 RX ADMIN — PIPERACILLIN AND TAZOBACTAM 25 GRAM(S): 4; .5 INJECTION, POWDER, LYOPHILIZED, FOR SOLUTION INTRAVENOUS at 22:33

## 2023-12-25 RX ADMIN — Medication 50 MILLIGRAM(S): at 13:54

## 2023-12-25 RX ADMIN — SENNA PLUS 2 TABLET(S): 8.6 TABLET ORAL at 22:29

## 2023-12-25 RX ADMIN — NYSTATIN CREAM 1 APPLICATION(S): 100000 CREAM TOPICAL at 05:41

## 2023-12-25 RX ADMIN — LEVALBUTEROL 0.63 MILLIGRAM(S): 1.25 SOLUTION, CONCENTRATE RESPIRATORY (INHALATION) at 21:00

## 2023-12-25 RX ADMIN — Medication 35 UNIT(S): at 09:14

## 2023-12-25 RX ADMIN — OXYCODONE HYDROCHLORIDE 60 MILLIGRAM(S): 5 TABLET ORAL at 18:58

## 2023-12-25 RX ADMIN — HEPARIN SODIUM 800 UNIT(S)/HR: 5000 INJECTION INTRAVENOUS; SUBCUTANEOUS at 03:06

## 2023-12-25 RX ADMIN — Medication 14: at 09:14

## 2023-12-25 RX ADMIN — OXYCODONE HYDROCHLORIDE 60 MILLIGRAM(S): 5 TABLET ORAL at 06:30

## 2023-12-25 RX ADMIN — Medication 50 MILLIGRAM(S): at 22:29

## 2023-12-25 RX ADMIN — PIPERACILLIN AND TAZOBACTAM 25 GRAM(S): 4; .5 INJECTION, POWDER, LYOPHILIZED, FOR SOLUTION INTRAVENOUS at 13:54

## 2023-12-25 RX ADMIN — Medication 40 MILLIGRAM(S): at 05:40

## 2023-12-25 RX ADMIN — Medication 0.25 MILLIGRAM(S): at 13:59

## 2023-12-25 RX ADMIN — Medication 10: at 18:13

## 2023-12-25 RX ADMIN — LEVALBUTEROL 0.63 MILLIGRAM(S): 1.25 SOLUTION, CONCENTRATE RESPIRATORY (INHALATION) at 15:15

## 2023-12-25 RX ADMIN — PIPERACILLIN AND TAZOBACTAM 25 GRAM(S): 4; .5 INJECTION, POWDER, LYOPHILIZED, FOR SOLUTION INTRAVENOUS at 05:43

## 2023-12-25 RX ADMIN — Medication 1 APPLICATION(S): at 18:12

## 2023-12-25 RX ADMIN — Medication 500 MICROGRAM(S): at 05:12

## 2023-12-25 RX ADMIN — OXYCODONE HYDROCHLORIDE 60 MILLIGRAM(S): 5 TABLET ORAL at 05:42

## 2023-12-25 RX ADMIN — OXYCODONE HYDROCHLORIDE 5 MILLIGRAM(S): 5 TABLET ORAL at 23:29

## 2023-12-25 RX ADMIN — Medication 40 MILLIGRAM(S): at 18:13

## 2023-12-25 RX ADMIN — BUDESONIDE AND FORMOTEROL FUMARATE DIHYDRATE 2 PUFF(S): 160; 4.5 AEROSOL RESPIRATORY (INHALATION) at 21:00

## 2023-12-25 RX ADMIN — LEVALBUTEROL 0.63 MILLIGRAM(S): 1.25 SOLUTION, CONCENTRATE RESPIRATORY (INHALATION) at 09:32

## 2023-12-25 RX ADMIN — INSULIN GLARGINE 50 UNIT(S): 100 INJECTION, SOLUTION SUBCUTANEOUS at 22:31

## 2023-12-25 RX ADMIN — LACTULOSE 20 GRAM(S): 10 SOLUTION ORAL at 13:58

## 2023-12-25 RX ADMIN — OXYCODONE HYDROCHLORIDE 5 MILLIGRAM(S): 5 TABLET ORAL at 10:44

## 2023-12-25 NOTE — PROGRESS NOTE ADULT - PROBLEM SELECTOR PLAN 1
- Pt still appears fluid overloaded   - TTE with preserved EF  - BNP downtrending from 1.6K to 712  - Trop <50  - GDMT: ACEi/ARBs on hold due to CKD. c/w lasix 40 mg IV push daily. c/w metoprolol 50 mg q8h PO.  - Will reintroduce other GDMT medications post-cath.   - Eventual R/LHC once respiratory status back to baseline.   - Monitor on telemetry.  Strict i/o and daily weights.  Keep K > 4, Mg > 2.  Monitor renal function with ongoing diuresis.   - wean off oxygen as tolerated

## 2023-12-25 NOTE — PROGRESS NOTE ADULT - ASSESSMENT
69 yo female with HTN, HLD, DM2, HFpEF, CKD III, DVT, Uterine CA, COPD on home o2 who presented with complaints of shortness of breath. Patient reports that she has been sick all week and was recently started on Vantin and steroids while at the nursing home. Patient reports that her dyspnea just worsened and she told the nursing home to send her in for an evaluation. While in the ED, patient was placed on BIPAP and was given IV lasix with some improvement. RVP then results as RSV +. Patient had a CT scan which also showed pneumonia and pulmonary HTN. Admission to medicine was requested for further management.    #new afib:  echo w/ preserved EF  - EP consulted, no DCCV given resp issues  - cardiology following, plan for R/LHC when resp condition improves  - HR better controlled today  - c/w loprssor 50mg q8hr  - c/w heparin drip for [id number] 6 until LHC with cardio    # Acute on chronic respiratory failure  # Acute HFpEF exacerbation with likely with Superimposed bacterial Pneumonia in setting of RSV  #Pulmonary hypertension  - WBC at 25 today, pt afebrile  - remains on HFNC, will continue to attempt to wean  - pt not compliant with bipap overnight, will attempt during day today   - sputum clx ordered for last several days, nurse unable to collect  - repeat MRSA PCR negative  - c/w zosyn (day 9)  - decrease IV steroids to 40mg q12hr today, taper to daily tomorrow  - levalbuterol and ipratropium q6hrs, symbicort BID   - pt refusing CT scan for re-eval of lungs, states unable to tolerate  - c/w IV Lasix qd  - BIPAP qhs  - pulm following     # Leukocytosis  - may represent worsening PNA vs steroid induced  - c/w zosyn  - check sputum clx and repeat MRSA PCR  - pt refusing CT scan, states unable to tolerate trip  - trend CBC and monitor     #Chronic pain  - continue home Pain meds of Oxycodone 60 mg q12h and Oxycodone 5mg q6h PRN    # hx of suspicion for PE. DVT  - Patient has empirically been treated for PE/DVT since August  - No scans noted to have PE/DVT  - duplex lower ext to without dvt   - ddimer negative. dc eliquis per cardiology     # HLD  - Atorvastatin for rosuvastatin    # COPD  - Steroids as above  - Symbicort, Spiriva, albuterol  - pulm following     #DM  #steroid induced hyperglycemia  - lantus/premeal insulin per endo  - lispro sliding scale    DVT prophylaxis: heparin ggt  Dispo: pending resp status optimization     67 yo female with HTN, HLD, DM2, HFpEF, CKD III, DVT, Uterine CA, COPD on home o2 who presented with complaints of shortness of breath. Patient reports that she has been sick all week and was recently started on Vantin and steroids while at the nursing home. Patient reports that her dyspnea just worsened and she told the nursing home to send her in for an evaluation. While in the ED, patient was placed on BIPAP and was given IV lasix with some improvement. RVP then results as RSV +. Patient had a CT scan which also showed pneumonia and pulmonary HTN. Admission to medicine was requested for further management.    #new afib:  echo w/ preserved EF  - EP consulted, no DCCV given resp issues  - cardiology following, plan for R/LHC when resp condition improves  - HR better controlled today  - c/w loprssor 50mg q8hr  - c/w heparin drip for [id number] 6 until LHC with cardio    # Acute on chronic respiratory failure  # Acute HFpEF exacerbation with likely with Superimposed bacterial Pneumonia in setting of RSV  #Pulmonary hypertension  - WBC at 25 today, pt afebrile  - remains on HFNC, will continue to attempt to wean  - pt not compliant with bipap overnight, will attempt during day today   - sputum clx ordered for last several days, nurse unable to collect  - repeat MRSA PCR negative  - c/w zosyn (day 9)  - decrease IV steroids to 40mg q12hr today, taper to daily tomorrow  - levalbuterol and ipratropium q6hrs, symbicort BID   - pt refusing CT scan for re-eval of lungs, states unable to tolerate  - c/w IV Lasix qd  - BIPAP qhs  - pulm following     # Leukocytosis  - may represent worsening PNA vs steroid induced  - c/w zosyn  - check sputum clx and repeat MRSA PCR  - pt refusing CT scan, states unable to tolerate trip  - trend CBC and monitor     #Chronic pain  - continue home Pain meds of Oxycodone 60 mg q12h and Oxycodone 5mg q6h PRN    # hx of suspicion for PE. DVT  - Patient has empirically been treated for PE/DVT since August  - No scans noted to have PE/DVT  - duplex lower ext to without dvt   - ddimer negative. dc eliquis per cardiology     # HLD  - Atorvastatin for rosuvastatin    # COPD  - Steroids as above  - Symbicort, Spiriva, albuterol  - pulm following     #DM  #steroid induced hyperglycemia  - lantus/premeal insulin per endo  - lispro sliding scale    DVT prophylaxis: heparin ggt  Dispo: pending resp status optimization

## 2023-12-25 NOTE — PROGRESS NOTE ADULT - SUBJECTIVE AND OBJECTIVE BOX
[facility]YS[facility]                                                         CARDIOLOGY AT [facility]                                                                  [address] [facility] [place] [place] [address]- [facility]                                                         Telephone: [phone]. Fax:[phone]                                                                             PROGRESS NOTE    Reason for follow up:  New aflutter/HFpEF   Update: Patient is on high flow nasal cannula with occasional desaturation. Still getting diuresed with lasix 40 mg IV push daily. Net negative -906 in 24 hours. On telemetry, AFib low 100s.     Review of symptoms:   Cardiac:  No chest pain. No dyspnea. No palpitations.  Respiratory: no cough. No dyspnea  Gastrointestinal: No diarrhea. No abdominal pain. No bleeding.   Neuro: No focal neuro complaints.    Vitals:  T(C): 37.2 (12-25-23 @ 04:35), Max: 37.3 (12-25-23 @ 00:00)  HR: 105 (12-25-23 @ 09:33) (96 - 114)  BP: 116/92 (12-25-23 @ 08:00) (114/46 - 146/61)  RR: 20 (12-25-23 @ 08:00) (20 - 22)  SpO2: 94% (12-25-23 @ 09:33) (92% - 97%)  Wt(kg): --  I&O's Summary    24 Dec 2023 07:01  -  25 Dec 2023 07:00  --------------------------------------------------------  IN: 1994 mL / OUT: 2900 mL / NET: -906 mL          PHYSICAL EXAM:  Appearance: Comfortable. No acute distress  HEENT:  Atraumatic. Normocephalic.  Normal oral mucosa  Neurologic: A & O x 3, no gross focal deficits.  Cardiovascular: Irregularly irregular, No murmur, no rubs/gallops. No JVD  Respiratory: Lungs sounds coarse bilaterally, unlabored   Gastrointestinal:  Soft, Non-tender, + BS  Lower Extremities: 2+ Peripheral Pulses, No clubbing, cyanosis, or edema  Psychiatry: Patient is calm. No agitation.   Skin: warm and dry.    CURRENT CARDIAC MEDICATIONS:  furosemide   Injectable 40 milliGRAM(s) IV Push daily  hydrALAZINE Injectable 10 milliGRAM(s) IV Push every 4 hours PRN  metoprolol tartrate 50 milliGRAM(s) Oral every 8 hours  metoprolol tartrate Injectable 5 milliGRAM(s) IV Push every 6 hours PRN      CURRENT OTHER MEDICATIONS:  albuterol    90 MICROgram(s) HFA Inhaler 2 Puff(s) Inhalation every 2 hours PRN Shortness of Breath and/or Wheezing  budesonide  80 MICROgram(s)/formoterol 4.5 MICROgram(s) Inhaler 2 Puff(s) Inhalation two times a day  ipratropium    for Nebulization 500 MICROGram(s) Nebulizer every 6 hours  levalbuterol Inhalation 0.63 milliGRAM(s) Inhalation every 6 hours  piperacillin/tazobactam IVPB.. 3.375 Gram(s) IV Intermittent every 8 hours  acetaminophen     Tablet .. 650 milliGRAM(s) Oral every 6 hours PRN Temp greater or equal to 38C (100.4F), Mild Pain (1 - 3)  ALPRAZolam 0.25 milliGRAM(s) Oral every 8 hours PRN for anxiety/Sleep  diphenhydrAMINE 25 milliGRAM(s) Oral every 6 hours PRN Rash and/or Itching  HYDROmorphone  Injectable 1 milliGRAM(s) IV Push every 4 hours PRN breakthrough  pain  HYDROmorphone  Injectable 0.5 milliGRAM(s) IV Push every 6 hours PRN Severe Pain (7 - 10)  melatonin 3 milliGRAM(s) Oral at bedtime PRN Insomnia  ondansetron Injectable 4 milliGRAM(s) IV Push every 8 hours PRN Nausea and/or Vomiting  oxyCODONE    IR 5 milliGRAM(s) Oral every 6 hours PRN Moderate Pain (4 - 6)  oxyCODONE  ER Tablet 60 milliGRAM(s) Oral every 12 hours  famotidine    Tablet 20 milliGRAM(s) Oral daily  lactulose Syrup 20 Gram(s) Oral daily  polyethylene glycol 3350 17 Gram(s) Oral daily  senna 2 Tablet(s) Oral at bedtime  atorvastatin 80 milliGRAM(s) Oral at bedtime  dextrose 50% Injectable 12.5 Gram(s) IV Push once, Stop order after: 1 Doses  dextrose 50% Injectable 25 Gram(s) IV Push once, Stop order after: 1 Doses  dextrose 50% Injectable 25 Gram(s) IV Push once, Stop order after: 1 Doses  dextrose Oral Gel 15 Gram(s) Oral once, Stop order after: 1 Doses PRN Blood Glucose LESS THAN 70 milliGRAM(s)/deciliter  glucagon  Injectable 1 milliGRAM(s) IntraMuscular once, Stop order after: 1 Doses  insulin glargine Injectable (LANTUS) 45 Unit(s) SubCutaneous at bedtime  insulin glargine Injectable (LANTUS) 45 Unit(s) SubCutaneous every morning  insulin lispro (ADMELOG) corrective regimen sliding scale   SubCutaneous three times a day before meals  insulin lispro Injectable (ADMELOG) 35 Unit(s) SubCutaneous three times a day before meals  methylPREDNISolone sodium succinate Injectable 40 milliGRAM(s) IV Push every 8 hours  clotrimazole 1% Cream 1 Application(s) Topical two times a day  dextrose 5%. 1000 milliLiter(s) (50 mL/Hr) IV Continuous <Continuous>  dextrose 5%. 1000 milliLiter(s) (100 mL/Hr) IV Continuous <Continuous>  heparin   Injectable 5000 Unit(s) IV Push every 6 hours PRN For aPTT between 40 - 57  heparin   Injectable 87450 Unit(s) IV Push every 6 hours PRN For aPTT less than 40  heparin  Infusion.  Unit(s)/Hr (24 mL/Hr) IV Continuous <Continuous>  nystatin Powder 1 Application(s) Topical two times a day      LABS:	 	                            15.0   22.84 )-----------( 297      ( 24 Dec 2023 08:41 )             46.4     12-24    133<L>  |  89<L>  |  70.0<H>  ----------------------------<  405<H>  5.0   |  31.0<H>  |  1.74<H>    Ca    9.0      24 Dec 2023 08:41  Mg     2.6     12-24      PT/INR/PTT ( 25 Dec 2023 01:58 )                       :                       :      X            :       108.7                 .        .                   .              .           .       X           .                                       Lipid Profile: Date: 12-17 @ 08:20  Total cholesterol 140; Direct LDL: --; HDL: 70; Triglycerides:133    HgA1c: 8.3%   TSH: Thyroid Stimulating Hormone, Serum: 0.34 uIU/mL      TELEMETRY: AFib HR low 100s   ECG: Sinus tachycardia.      DIAGNOSTIC TESTING:  [ x] Echocardiogram: < from: TTE W or WO Ultrasound Enhancing Agent (12.18.23 @ 13:36) >      1. Technically difficult image quality.   2. Normal biventricular systolic function.   3. Compared to the transthoracic echocardiogram performed on 8/31/2023.    < end of copied text >    [ ]  Catheterization:  [ x] Stress Test:  < from: Stress Echocardiogram (07.28.20 @ 10:55) >   1. Definity Echo contrast was used. Difficult study due to patients body habitus.   2. Negative stress echo for ischemia. Despite EKG changes, there were no regional wall motion abnormalities.   3. Rare PVCs were seen.   4. Elevated PASP at rest and at peak of infusion.      < end of copied text >    OTHER: 	                                                                [facility]                                                         CARDIOLOGY AT [facility]                                                                  [address] [facility] [place] [facility] [place] [address]- [facility]                                                         Telephone: [phone]. Fax:[phone]                                                                             PROGRESS NOTE    Reason for follow up:  New aflutter/HFpEF   Update: Patient is on high flow nasal cannula with occasional desaturation. Still getting diuresed with lasix 40 mg IV push daily. Net negative -906 in 24 hours. On telemetry, AFib low 100s.     Review of symptoms:   Cardiac:  No chest pain. No dyspnea. No palpitations.  Respiratory: no cough. No dyspnea  Gastrointestinal: No diarrhea. No abdominal pain. No bleeding.   Neuro: No focal neuro complaints.    Vitals:  T(C): 37.2 (12-25-23 @ 04:35), Max: 37.3 (12-25-23 @ 00:00)  HR: 105 (12-25-23 @ 09:33) (96 - 114)  BP: 116/92 (12-25-23 @ 08:00) (114/46 - 146/61)  RR: 20 (12-25-23 @ 08:00) (20 - 22)  SpO2: 94% (12-25-23 @ 09:33) (92% - 97%)  Wt(kg): --  I&O's Summary    24 Dec 2023 07:01  -  25 Dec 2023 07:00  --------------------------------------------------------  IN: 1994 mL / OUT: 2900 mL / NET: -906 mL          PHYSICAL EXAM:  Appearance: Comfortable. No acute distress  HEENT:  Atraumatic. Normocephalic.  Normal oral mucosa  Neurologic: A & O x 3, no gross focal deficits.  Cardiovascular: Irregularly irregular, No murmur, no rubs/gallops. No JVD  Respiratory: Lungs sounds coarse bilaterally, unlabored   Gastrointestinal:  Soft, Non-tender, + BS  Lower Extremities: 2+ Peripheral Pulses, No clubbing, cyanosis, or edema  Psychiatry: Patient is calm. No agitation.   Skin: warm and dry.    CURRENT CARDIAC MEDICATIONS:  furosemide   Injectable 40 milliGRAM(s) IV Push daily  hydrALAZINE Injectable 10 milliGRAM(s) IV Push every 4 hours PRN  metoprolol tartrate 50 milliGRAM(s) Oral every 8 hours  metoprolol tartrate Injectable 5 milliGRAM(s) IV Push every 6 hours PRN      CURRENT OTHER MEDICATIONS:  albuterol    90 MICROgram(s) HFA Inhaler 2 Puff(s) Inhalation every 2 hours PRN Shortness of Breath and/or Wheezing  budesonide  80 MICROgram(s)/formoterol 4.5 MICROgram(s) Inhaler 2 Puff(s) Inhalation two times a day  ipratropium    for Nebulization 500 MICROGram(s) Nebulizer every 6 hours  levalbuterol Inhalation 0.63 milliGRAM(s) Inhalation every 6 hours  piperacillin/tazobactam IVPB.. 3.375 Gram(s) IV Intermittent every 8 hours  acetaminophen     Tablet .. 650 milliGRAM(s) Oral every 6 hours PRN Temp greater or equal to 38C (100.4F), Mild Pain (1 - 3)  ALPRAZolam 0.25 milliGRAM(s) Oral every 8 hours PRN for anxiety/Sleep  diphenhydrAMINE 25 milliGRAM(s) Oral every 6 hours PRN Rash and/or Itching  HYDROmorphone  Injectable 1 milliGRAM(s) IV Push every 4 hours PRN breakthrough  pain  HYDROmorphone  Injectable 0.5 milliGRAM(s) IV Push every 6 hours PRN Severe Pain (7 - 10)  melatonin 3 milliGRAM(s) Oral at bedtime PRN Insomnia  ondansetron Injectable 4 milliGRAM(s) IV Push every 8 hours PRN Nausea and/or Vomiting  oxyCODONE    IR 5 milliGRAM(s) Oral every 6 hours PRN Moderate Pain (4 - 6)  oxyCODONE  ER Tablet 60 milliGRAM(s) Oral every 12 hours  famotidine    Tablet 20 milliGRAM(s) Oral daily  lactulose Syrup 20 Gram(s) Oral daily  polyethylene glycol 3350 17 Gram(s) Oral daily  senna 2 Tablet(s) Oral at bedtime  atorvastatin 80 milliGRAM(s) Oral at bedtime  dextrose 50% Injectable 12.5 Gram(s) IV Push once, Stop order after: 1 Doses  dextrose 50% Injectable 25 Gram(s) IV Push once, Stop order after: 1 Doses  dextrose 50% Injectable 25 Gram(s) IV Push once, Stop order after: 1 Doses  dextrose Oral Gel 15 Gram(s) Oral once, Stop order after: 1 Doses PRN Blood Glucose LESS THAN 70 milliGRAM(s)/deciliter  glucagon  Injectable 1 milliGRAM(s) IntraMuscular once, Stop order after: 1 Doses  insulin glargine Injectable (LANTUS) 45 Unit(s) SubCutaneous at bedtime  insulin glargine Injectable (LANTUS) 45 Unit(s) SubCutaneous every morning  insulin lispro (ADMELOG) corrective regimen sliding scale   SubCutaneous three times a day before meals  insulin lispro Injectable (ADMELOG) 35 Unit(s) SubCutaneous three times a day before meals  methylPREDNISolone sodium succinate Injectable 40 milliGRAM(s) IV Push every 8 hours  clotrimazole 1% Cream 1 Application(s) Topical two times a day  dextrose 5%. 1000 milliLiter(s) (50 mL/Hr) IV Continuous <Continuous>  dextrose 5%. 1000 milliLiter(s) (100 mL/Hr) IV Continuous <Continuous>  heparin   Injectable 5000 Unit(s) IV Push every 6 hours PRN For aPTT between 40 - 57  heparin   Injectable 28105 Unit(s) IV Push every 6 hours PRN For aPTT less than 40  heparin  Infusion.  Unit(s)/Hr (24 mL/Hr) IV Continuous <Continuous>  nystatin Powder 1 Application(s) Topical two times a day      LABS:	 	                            15.0   22.84 )-----------( 297      ( 24 Dec 2023 08:41 )             46.4     12-24    133<L>  |  89<L>  |  70.0<H>  ----------------------------<  405<H>  5.0   |  31.0<H>  |  1.74<H>    Ca    9.0      24 Dec 2023 08:41  Mg     2.6     12-24      PT/INR/PTT ( 25 Dec 2023 01:58 )                       :                       :      X            :       108.7                 .        .                   .              .           .       X           .                                       Lipid Profile: Date: 12-17 @ 08:20  Total cholesterol 140; Direct LDL: --; HDL: 70; Triglycerides:133    HgA1c: 8.3%   TSH: Thyroid Stimulating Hormone, Serum: 0.34 uIU/mL      TELEMETRY: AFib HR low 100s   ECG: Sinus tachycardia.      DIAGNOSTIC TESTING:  [ x] Echocardiogram: < from: TTE W or WO Ultrasound Enhancing Agent (12.18.23 @ 13:36) >      1. Technically difficult image quality.   2. Normal biventricular systolic function.   3. Compared to the transthoracic echocardiogram performed on 8/31/2023.    < end of copied text >    [ ]  Catheterization:  [ x] Stress Test:  < from: Stress Echocardiogram (07.28.20 @ 10:55) >   1. Definity Echo contrast was used. Difficult study due to patients body habitus.   2. Negative stress echo for ischemia. Despite EKG changes, there were no regional wall motion abnormalities.   3. Rare PVCs were seen.   4. Elevated PASP at rest and at peak of infusion.      < end of copied text >    OTHER:

## 2023-12-25 NOTE — PROGRESS NOTE ADULT - NUTRITIONAL ASSESSMENT
This patient has been assessed with a concern for Malnutrition and has been determined to have a diagnosis/diagnoses of Morbid obesity (BMI > 40).    This patient is being managed with:   Diet Consistent Carbohydrate w/Evening Snack-  1500mL Fluid Restriction ([id number])  Low Sodium  Entered: Dec 19 2023  6:23PM   This patient has been assessed with a concern for Malnutrition and has been determined to have a diagnosis/diagnoses of Morbid obesity (BMI > 40).    This patient is being managed with:   Diet Consistent Carbohydrate w/Evening Snack-  1500mL Fluid Restriction ([id number])  Low Sodium  Entered: Dec 19 2023  6:23PM

## 2023-12-25 NOTE — PROGRESS NOTE ADULT - NS ATTEND AMEND GEN_ALL_CORE FT
seen with above,      68F super morbid obesity (BMI >60), ENRIQUE/OHS, chronic respiratory failure, severe pulmonary HTN with RV dysfunction, CKD 3, HFpEF, recent admission 9/2023 with hypoxic respiratory failure unable to get CTPA or VQ scan and was empirically treated with Eliquis now readmitted for acute on chronic hypoxemic respiratory failure on treatment with IV diuresis and empiric Zosyn with Solumedrol, +RSV as well    -was on nasal BiPAP and been on HFNC x6 days with improving sat 97%, gradual improvement in respiratory status, repeat weight <400 lbs  -elevated WBCs s/p steroids but afebrile, pending repeat CT chest   -diuresing well on IV Lasix 40mg once daily, Cr. stable, continue to aim for net negative output been diuresing 2-3 liters daily (prior admission Cr. peak at 2.1), , will continue optimize to reattempt later this week when she will be able to come off HFNC  -may need addition of acetazolamide if further uptrend in bicarb  -been in persistent Afib RVR up to 130-140s refractory to PO/IV metoprolol, given IV 0.5mg digoxin and was on dilitazem gtt @5mg/hr, HR better controlled will continue metoprolol alone without diltiazem gtt increased dose to 50mg q8hrs, continue to uptitrate for goal resting HR 110s as long as BP tolerates  -prior SBP 90s, off amlodipine for now, may consider spironolactone or Entresto after cath if Cr. stable  -ABG with chronic CO2 retention, she is awake/alert and conversive, continue nocturnal BiPAP and pulmonary follow up. Pending repeat CT chest  -discontinued empiric Eliquis, normal D-dimer, but now on heparin gtt for pAfib and continue until after R/LHC  -need weight loss         Lenard Van DO, [facility]  Faculty Non-Invasive Cardiologist  [phone] seen with above,      68F super morbid obesity (BMI >60), ENRIQUE/OHS, chronic respiratory failure, severe pulmonary HTN with RV dysfunction, CKD 3, HFpEF, recent admission 9/2023 with hypoxic respiratory failure unable to get CTPA or VQ scan and was empirically treated with Eliquis now readmitted for acute on chronic hypoxemic respiratory failure on treatment with IV diuresis and empiric Zosyn with Solumedrol, +RSV as well    -was on nasal BiPAP and been on HFNC x6 days with improving sat 97%, gradual improvement in respiratory status, repeat weight <400 lbs  -elevated WBCs s/p steroids but afebrile, pending repeat CT chest   -diuresing well on IV Lasix 40mg once daily, Cr. stable, continue to aim for net negative output been diuresing 2-3 liters daily (prior admission Cr. peak at 2.1), , will continue optimize to reattempt later this week when she will be able to come off HFNC  -may need addition of acetazolamide if further uptrend in bicarb  -been in persistent Afib RVR up to 130-140s refractory to PO/IV metoprolol, given IV 0.5mg digoxin and was on dilitazem gtt @5mg/hr, HR better controlled will continue metoprolol alone without diltiazem gtt increased dose to 50mg q8hrs, continue to uptitrate for goal resting HR 110s as long as BP tolerates  -prior SBP 90s, off amlodipine for now, may consider spironolactone or Entresto after cath if Cr. stable  -ABG with chronic CO2 retention, she is awake/alert and conversive, continue nocturnal BiPAP and pulmonary follow up. Pending repeat CT chest  -discontinued empiric Eliquis, normal D-dimer, but now on heparin gtt for pAfib and continue until after R/LHC  -need weight loss         Lenard Van DO, [facility]  Faculty Non-Invasive Cardiologist  [phone]

## 2023-12-25 NOTE — PROGRESS NOTE ADULT - ASSESSMENT
69 yo female with HTN, HLD, DM2, HFpEF, CKD III, DVT, Uterine CA, COPD on home o2 who presented with complaints of shortness of breath. ADmitted with acute on chronic resp failure and acute HFpEF exacerbation with likely with Superimposed bacterial Pneumonia in setting of RSV and new onset AFib.    1. T2DM with severe hyperglycemia in setting of high dose steroids  - increase Lantus to 50 units BID  - cont prandial Admelog 35 units TID and high dose Admelog scale  - will follow, will need insulin dose adjustment with steroid taper    2. Acute on chronic respiratory failure and Acute HFpEF exacerbation with likely with Superimposed bacterial Pneumonia in setting of RSV  - IV Zosyn  - steroids per primary team, to start weaning this week  - on HFNC  - IV lasix    3. HLD - cont statin 67 yo female with HTN, HLD, DM2, HFpEF, CKD III, DVT, Uterine CA, COPD on home o2 who presented with complaints of shortness of breath. ADmitted with acute on chronic resp failure and acute HFpEF exacerbation with likely with Superimposed bacterial Pneumonia in setting of RSV and new onset AFib.    1. T2DM with severe hyperglycemia in setting of high dose steroids  - increase Lantus to 50 units BID  - cont prandial Admelog 35 units TID and high dose Admelog scale  - will follow, will need insulin dose adjustment with steroid taper    2. Acute on chronic respiratory failure and Acute HFpEF exacerbation with likely with Superimposed bacterial Pneumonia in setting of RSV  - IV Zosyn  - steroids per primary team, to start weaning this week  - on HFNC  - IV lasix    3. HLD - cont statin

## 2023-12-25 NOTE — PROGRESS NOTE ADULT - SUBJECTIVE AND OBJECTIVE BOX
[facility] Division of Medicine    SUBJECTIVE / OVERNIGHT EVENTS: Pt seen at the bedside. Complains of not feeling well but states is vague and cannot specify. Patient denies chest pain, SOB, abd pain, N/V, fever, chills, dysuria or any other complaints. All remainder ROS negative.     MEDICATIONS  (STANDING):  atorvastatin 80 milliGRAM(s) Oral at bedtime  budesonide  80 MICROgram(s)/formoterol 4.5 MICROgram(s) Inhaler 2 Puff(s) Inhalation two times a day  clotrimazole 1% Cream 1 Application(s) Topical two times a day  dextrose 5%. 1000 milliLiter(s) (100 mL/Hr) IV Continuous <Continuous>  dextrose 5%. 1000 milliLiter(s) (50 mL/Hr) IV Continuous <Continuous>  dextrose 50% Injectable 12.5 Gram(s) IV Push once  dextrose 50% Injectable 25 Gram(s) IV Push once  dextrose 50% Injectable 25 Gram(s) IV Push once  famotidine    Tablet 20 milliGRAM(s) Oral daily  furosemide   Injectable 40 milliGRAM(s) IV Push once  furosemide   Injectable 40 milliGRAM(s) IV Push daily  glucagon  Injectable 1 milliGRAM(s) IntraMuscular once  heparin  Infusion.  Unit(s)/Hr (24 mL/Hr) IV Continuous <Continuous>  insulin glargine Injectable (LANTUS) 45 Unit(s) SubCutaneous at bedtime  insulin glargine Injectable (LANTUS) 45 Unit(s) SubCutaneous every morning  insulin lispro (ADMELOG) corrective regimen sliding scale   SubCutaneous three times a day before meals  insulin lispro Injectable (ADMELOG) 35 Unit(s) SubCutaneous three times a day before meals  ipratropium    for Nebulization 500 MICROGram(s) Nebulizer every 6 hours  lactulose Syrup 20 Gram(s) Oral daily  levalbuterol Inhalation 0.63 milliGRAM(s) Inhalation every 6 hours  methylPREDNISolone sodium succinate Injectable 40 milliGRAM(s) IV Push every 12 hours  metoprolol tartrate 50 milliGRAM(s) Oral every 8 hours  nystatin Powder 1 Application(s) Topical two times a day  oxyCODONE  ER Tablet 60 milliGRAM(s) Oral every 12 hours  piperacillin/tazobactam IVPB.. 3.375 Gram(s) IV Intermittent every 8 hours  polyethylene glycol 3350 17 Gram(s) Oral daily  senna 2 Tablet(s) Oral at bedtime    MEDICATIONS  (PRN):  acetaminophen     Tablet .. 650 milliGRAM(s) Oral every 6 hours PRN Temp greater or equal to 38C (100.4F), Mild Pain (1 - 3)  albuterol    90 MICROgram(s) HFA Inhaler 2 Puff(s) Inhalation every 2 hours PRN Shortness of Breath and/or Wheezing  ALPRAZolam 0.25 milliGRAM(s) Oral every 8 hours PRN for anxiety/Sleep  dextrose Oral Gel 15 Gram(s) Oral once PRN Blood Glucose LESS THAN 70 milliGRAM(s)/deciliter  diphenhydrAMINE 25 milliGRAM(s) Oral every 6 hours PRN Rash and/or Itching  heparin   Injectable 5000 Unit(s) IV Push every 6 hours PRN For aPTT between 40 - 57  heparin   Injectable 49513 Unit(s) IV Push every 6 hours PRN For aPTT less than 40  hydrALAZINE Injectable 10 milliGRAM(s) IV Push every 4 hours PRN for systolic bp > 160  HYDROmorphone  Injectable 1 milliGRAM(s) IV Push every 4 hours PRN breakthrough  pain  HYDROmorphone  Injectable 0.5 milliGRAM(s) IV Push every 6 hours PRN Severe Pain (7 - 10)  melatonin 3 milliGRAM(s) Oral at bedtime PRN Insomnia  metoprolol tartrate Injectable 5 milliGRAM(s) IV Push every 6 hours PRN heart rate sustaining greater than 130  ondansetron Injectable 4 milliGRAM(s) IV Push every 8 hours PRN Nausea and/or Vomiting  oxyCODONE    IR 5 milliGRAM(s) Oral every 6 hours PRN Moderate Pain (4 - 6)      I&O's Summary    24 Dec 2023 07:01  -  25 Dec 2023 07:00  --------------------------------------------------------  IN: 1994 mL / OUT: 2900 mL / NET: -906 mL        PHYSICAL EXAM:  Vital Signs Last 24 Hrs  T(C): 37.2 (25 Dec 2023 04:35), Max: 37.3 (25 Dec 2023 00:00)  T(F): 99 (25 Dec 2023 04:35), Max: 99.1 (25 Dec 2023 00:00)  HR: 105 (25 Dec 2023 09:33) (96 - 114)  BP: 116/92 (25 Dec 2023 08:00) (114/62 - 146/61)  BP(mean): 68 (25 Dec 2023 06:00) (68 - 109)  RR: 20 (25 Dec 2023 08:00) (20 - 22)  SpO2: 94% (25 Dec 2023 09:33) (92% - 97%)    Parameters below as of 25 Dec 2023 09:33  Patient On (Oxygen Delivery Method): nasal cannula, high flow          GENERAL: not in acute distress, morbidly obese   HEENT:  Clear conjunctiva, PERRL,  EOMI, moist oral mucosa no pharyngeal injection or exudates, no cervical LAD  RESP:  Non-labored breathing pattern, lungs clear to ausculation, no wheezes or crackles appreciated  CV: Regular rate and rhythm, no murmurs appreciated, no lower extremity edema, peripheral pulses are 2+ bilaterally  GI: Soft, non-tender, non-distended  NEURO: Awake, alert, conversant, upper and lower extremity strength and light touch sensation grossly intact  PSYCH: Calm, cooperative, A&Ox3  SKIN: No rash or lesions, warm and       LABS:                        14.9   25.74 )-----------( 313      ( 25 Dec 2023 09:20 )             46.2     12-24    133<L>  |  89<L>  |  70.0<H>  ----------------------------<  405<H>  5.0   |  31.0<H>  |  1.74<H>    Ca    9.0      24 Dec 2023 08:41  Mg     2.6     12-24      PTT - ( 25 Dec 2023 09:20 )  PTT:67.4 sec      Urinalysis Basic - ( 24 Dec 2023 08:41 )    Color: x / Appearance: x / SG: x / pH: x  Gluc: 405 mg/dL / Ketone: x  / Bili: x / Urobili: x   Blood: x / Protein: x / Nitrite: x   Leuk Esterase: x / RBC: x / WBC x   Sq Epi: x / Non Sq Epi: x / Bacteria: x        CAPILLARY BLOOD GLUCOSE      POCT Blood Glucose.: 416 mg/dL (25 Dec 2023 09:08)  POCT Blood Glucose.: 234 mg/dL (24 Dec 2023 22:07)  POCT Blood Glucose.: 311 mg/dL (24 Dec 2023 18:05)  POCT Blood Glucose.: 268 mg/dL (24 Dec 2023 13:17)      IMAGING:                                   [facility] Division of Medicine    SUBJECTIVE / OVERNIGHT EVENTS: Pt seen at the bedside. Complains of not feeling well but states is vague and cannot specify. Patient denies chest pain, SOB, abd pain, N/V, fever, chills, dysuria or any other complaints. All remainder ROS negative.     MEDICATIONS  (STANDING):  atorvastatin 80 milliGRAM(s) Oral at bedtime  budesonide  80 MICROgram(s)/formoterol 4.5 MICROgram(s) Inhaler 2 Puff(s) Inhalation two times a day  clotrimazole 1% Cream 1 Application(s) Topical two times a day  dextrose 5%. 1000 milliLiter(s) (100 mL/Hr) IV Continuous <Continuous>  dextrose 5%. 1000 milliLiter(s) (50 mL/Hr) IV Continuous <Continuous>  dextrose 50% Injectable 12.5 Gram(s) IV Push once  dextrose 50% Injectable 25 Gram(s) IV Push once  dextrose 50% Injectable 25 Gram(s) IV Push once  famotidine    Tablet 20 milliGRAM(s) Oral daily  furosemide   Injectable 40 milliGRAM(s) IV Push once  furosemide   Injectable 40 milliGRAM(s) IV Push daily  glucagon  Injectable 1 milliGRAM(s) IntraMuscular once  heparin  Infusion.  Unit(s)/Hr (24 mL/Hr) IV Continuous <Continuous>  insulin glargine Injectable (LANTUS) 45 Unit(s) SubCutaneous at bedtime  insulin glargine Injectable (LANTUS) 45 Unit(s) SubCutaneous every morning  insulin lispro (ADMELOG) corrective regimen sliding scale   SubCutaneous three times a day before meals  insulin lispro Injectable (ADMELOG) 35 Unit(s) SubCutaneous three times a day before meals  ipratropium    for Nebulization 500 MICROGram(s) Nebulizer every 6 hours  lactulose Syrup 20 Gram(s) Oral daily  levalbuterol Inhalation 0.63 milliGRAM(s) Inhalation every 6 hours  methylPREDNISolone sodium succinate Injectable 40 milliGRAM(s) IV Push every 12 hours  metoprolol tartrate 50 milliGRAM(s) Oral every 8 hours  nystatin Powder 1 Application(s) Topical two times a day  oxyCODONE  ER Tablet 60 milliGRAM(s) Oral every 12 hours  piperacillin/tazobactam IVPB.. 3.375 Gram(s) IV Intermittent every 8 hours  polyethylene glycol 3350 17 Gram(s) Oral daily  senna 2 Tablet(s) Oral at bedtime    MEDICATIONS  (PRN):  acetaminophen     Tablet .. 650 milliGRAM(s) Oral every 6 hours PRN Temp greater or equal to 38C (100.4F), Mild Pain (1 - 3)  albuterol    90 MICROgram(s) HFA Inhaler 2 Puff(s) Inhalation every 2 hours PRN Shortness of Breath and/or Wheezing  ALPRAZolam 0.25 milliGRAM(s) Oral every 8 hours PRN for anxiety/Sleep  dextrose Oral Gel 15 Gram(s) Oral once PRN Blood Glucose LESS THAN 70 milliGRAM(s)/deciliter  diphenhydrAMINE 25 milliGRAM(s) Oral every 6 hours PRN Rash and/or Itching  heparin   Injectable 5000 Unit(s) IV Push every 6 hours PRN For aPTT between 40 - 57  heparin   Injectable 84349 Unit(s) IV Push every 6 hours PRN For aPTT less than 40  hydrALAZINE Injectable 10 milliGRAM(s) IV Push every 4 hours PRN for systolic bp > 160  HYDROmorphone  Injectable 1 milliGRAM(s) IV Push every 4 hours PRN breakthrough  pain  HYDROmorphone  Injectable 0.5 milliGRAM(s) IV Push every 6 hours PRN Severe Pain (7 - 10)  melatonin 3 milliGRAM(s) Oral at bedtime PRN Insomnia  metoprolol tartrate Injectable 5 milliGRAM(s) IV Push every 6 hours PRN heart rate sustaining greater than 130  ondansetron Injectable 4 milliGRAM(s) IV Push every 8 hours PRN Nausea and/or Vomiting  oxyCODONE    IR 5 milliGRAM(s) Oral every 6 hours PRN Moderate Pain (4 - 6)      I&O's Summary    24 Dec 2023 07:01  -  25 Dec 2023 07:00  --------------------------------------------------------  IN: 1994 mL / OUT: 2900 mL / NET: -906 mL        PHYSICAL EXAM:  Vital Signs Last 24 Hrs  T(C): 37.2 (25 Dec 2023 04:35), Max: 37.3 (25 Dec 2023 00:00)  T(F): 99 (25 Dec 2023 04:35), Max: 99.1 (25 Dec 2023 00:00)  HR: 105 (25 Dec 2023 09:33) (96 - 114)  BP: 116/92 (25 Dec 2023 08:00) (114/62 - 146/61)  BP(mean): 68 (25 Dec 2023 06:00) (68 - 109)  RR: 20 (25 Dec 2023 08:00) (20 - 22)  SpO2: 94% (25 Dec 2023 09:33) (92% - 97%)    Parameters below as of 25 Dec 2023 09:33  Patient On (Oxygen Delivery Method): nasal cannula, high flow          GENERAL: not in acute distress, morbidly obese   HEENT:  Clear conjunctiva, PERRL,  EOMI, moist oral mucosa no pharyngeal injection or exudates, no cervical LAD  RESP:  Non-labored breathing pattern, lungs clear to ausculation, no wheezes or crackles appreciated  CV: Regular rate and rhythm, no murmurs appreciated, no lower extremity edema, peripheral pulses are 2+ bilaterally  GI: Soft, non-tender, non-distended  NEURO: Awake, alert, conversant, upper and lower extremity strength and light touch sensation grossly intact  PSYCH: Calm, cooperative, A&Ox3  SKIN: No rash or lesions, warm and       LABS:                        14.9   25.74 )-----------( 313      ( 25 Dec 2023 09:20 )             46.2     12-24    133<L>  |  89<L>  |  70.0<H>  ----------------------------<  405<H>  5.0   |  31.0<H>  |  1.74<H>    Ca    9.0      24 Dec 2023 08:41  Mg     2.6     12-24      PTT - ( 25 Dec 2023 09:20 )  PTT:67.4 sec      Urinalysis Basic - ( 24 Dec 2023 08:41 )    Color: x / Appearance: x / SG: x / pH: x  Gluc: 405 mg/dL / Ketone: x  / Bili: x / Urobili: x   Blood: x / Protein: x / Nitrite: x   Leuk Esterase: x / RBC: x / WBC x   Sq Epi: x / Non Sq Epi: x / Bacteria: x        CAPILLARY BLOOD GLUCOSE      POCT Blood Glucose.: 416 mg/dL (25 Dec 2023 09:08)  POCT Blood Glucose.: 234 mg/dL (24 Dec 2023 22:07)  POCT Blood Glucose.: 311 mg/dL (24 Dec 2023 18:05)  POCT Blood Glucose.: 268 mg/dL (24 Dec 2023 13:17)      IMAGING:

## 2023-12-25 NOTE — PROGRESS NOTE ADULT - SUBJECTIVE AND OBJECTIVE BOX
Follow up: T2DM   Interval Hx/Events: hyperglycemic on steroids, on HFNC - unable to assess ROS    MEDICATIONS  (STANDING):  atorvastatin 80 milliGRAM(s) Oral at bedtime  budesonide  80 MICROgram(s)/formoterol 4.5 MICROgram(s) Inhaler 2 Puff(s) Inhalation two times a day  clotrimazole 1% Cream 1 Application(s) Topical two times a day  dextrose 5%. 1000 milliLiter(s) (50 mL/Hr) IV Continuous <Continuous>  dextrose 5%. 1000 milliLiter(s) (100 mL/Hr) IV Continuous <Continuous>  dextrose 50% Injectable 12.5 Gram(s) IV Push once  dextrose 50% Injectable 25 Gram(s) IV Push once  dextrose 50% Injectable 25 Gram(s) IV Push once  famotidine    Tablet 20 milliGRAM(s) Oral daily  furosemide   Injectable 40 milliGRAM(s) IV Push once  furosemide   Injectable 40 milliGRAM(s) IV Push daily  glucagon  Injectable 1 milliGRAM(s) IntraMuscular once  heparin  Infusion.  Unit(s)/Hr (24 mL/Hr) IV Continuous <Continuous>  insulin glargine Injectable (LANTUS) 45 Unit(s) SubCutaneous every morning  insulin glargine Injectable (LANTUS) 45 Unit(s) SubCutaneous at bedtime  insulin lispro (ADMELOG) corrective regimen sliding scale   SubCutaneous three times a day before meals  insulin lispro Injectable (ADMELOG) 35 Unit(s) SubCutaneous three times a day before meals  ipratropium    for Nebulization 500 MICROGram(s) Nebulizer every 6 hours  lactulose Syrup 20 Gram(s) Oral daily  levalbuterol Inhalation 0.63 milliGRAM(s) Inhalation every 6 hours  methylPREDNISolone sodium succinate Injectable 40 milliGRAM(s) IV Push every 12 hours  metoprolol tartrate 50 milliGRAM(s) Oral every 8 hours  nystatin Powder 1 Application(s) Topical two times a day  oxyCODONE  ER Tablet 60 milliGRAM(s) Oral every 12 hours  piperacillin/tazobactam IVPB.. 3.375 Gram(s) IV Intermittent every 8 hours  polyethylene glycol 3350 17 Gram(s) Oral daily  senna 2 Tablet(s) Oral at bedtime    MEDICATIONS  (PRN):  acetaminophen     Tablet .. 650 milliGRAM(s) Oral every 6 hours PRN Temp greater or equal to 38C (100.4F), Mild Pain (1 - 3)  albuterol    90 MICROgram(s) HFA Inhaler 2 Puff(s) Inhalation every 2 hours PRN Shortness of Breath and/or Wheezing  ALPRAZolam 0.25 milliGRAM(s) Oral every 8 hours PRN for anxiety/Sleep  dextrose Oral Gel 15 Gram(s) Oral once PRN Blood Glucose LESS THAN 70 milliGRAM(s)/deciliter  diphenhydrAMINE 25 milliGRAM(s) Oral every 6 hours PRN Rash and/or Itching  heparin   Injectable 71841 Unit(s) IV Push every 6 hours PRN For aPTT less than 40  heparin   Injectable 5000 Unit(s) IV Push every 6 hours PRN For aPTT between 40 - 57  hydrALAZINE Injectable 10 milliGRAM(s) IV Push every 4 hours PRN for systolic bp > 160  HYDROmorphone  Injectable 0.5 milliGRAM(s) IV Push every 6 hours PRN Severe Pain (7 - 10)  HYDROmorphone  Injectable 1 milliGRAM(s) IV Push every 4 hours PRN breakthrough  pain  melatonin 3 milliGRAM(s) Oral at bedtime PRN Insomnia  metoprolol tartrate Injectable 5 milliGRAM(s) IV Push every 6 hours PRN heart rate sustaining greater than 130  ondansetron Injectable 4 milliGRAM(s) IV Push every 8 hours PRN Nausea and/or Vomiting  oxyCODONE    IR 5 milliGRAM(s) Oral every 6 hours PRN Moderate Pain (4 - 6)    ALLERGIES: wool- rash, itch (Other)  adhesives (Rash)  latex (Rash)  Bactrim (Flushing)    Vital Signs Last 24 Hrs  T(C): 37.2 (25 Dec 2023 08:00), Max: 37.3 (25 Dec 2023 00:00)  T(F): 99 (25 Dec 2023 08:00), Max: 99.1 (25 Dec 2023 00:00)  HR: 100 (25 Dec 2023 10:00) (96 - 114)  BP: 150/59 (25 Dec 2023 10:00) (114/62 - 150/59)  BP(mean): 68 (25 Dec 2023 06:00) (68 - 109)  RR: 20 (25 Dec 2023 10:00) (20 - 22)  SpO2: 93% (25 Dec 2023 10:00) (92% - 97%)    Parameters below as of 25 Dec 2023 10:00  Patient On (Oxygen Delivery Method): nasal cannula, high flow  O2 Flow (L/min): 35  O2 Concentration (%): 40    Physical Exam: limited, on HFNC  General appearance: Obese  Lungs:  coarse BS  CV:tachycardic, irregular  Abdomen: Soft  Musculoskeletal: (+) edema.    LABS:                        14.9   25.74 )-----------( 313      ( 25 Dec 2023 09:20 )             46.2     12-25    133<L>  |  88<L>  |  73.3<H>  ----------------------------<  457<HH>  4.8   |  32.0<H>  |  1.85<H>    Ca    9.1      25 Dec 2023 09:20  Mg     2.6     12-24    TPro  6.5<L>  /  Alb  3.6  /  TBili  0.9  /  DBili  0.4<H>  /  AST  25  /  ALT  79<H>  /  AlkPhos  114  12-25    LIVER FUNCTIONS - ( 25 Dec 2023 09:20 )  Alb: 3.6 g/dL / Pro: 6.5 g/dL / ALK PHOS: 114 U/L / ALT: 79 U/L / AST: 25 U/L / GGT: x           A1C with Estimated Average Glucose Result: 8.3 % (12-17-23 @ 08:20)      CAPILLARY BLOOD GLUCOSE  POCT Blood Glucose.: 393 mg/dL (25 Dec 2023 10:59)  POCT Blood Glucose.: 416 mg/dL (25 Dec 2023 09:08)  POCT Blood Glucose.: 234 mg/dL (24 Dec 2023 22:07)  POCT Blood Glucose.: 311 mg/dL (24 Dec 2023 18:05)  POCT Blood Glucose.: 268 mg/dL (24 Dec 2023 13:17)  POCT Blood Glucose.: 335 mg/dL (24 Dec 2023 08:57)  POCT Blood Glucose.: 331 mg/dL (24 Dec 2023 05:55)  POCT Blood Glucose.: 337 mg/dL (24 Dec 2023 05:54)  POCT Blood Glucose.: 227 mg/dL (23 Dec 2023 22:05)  POCT Blood Glucose.: 196 mg/dL (23 Dec 2023 18:18)  POCT Blood Glucose.: 291 mg/dL (23 Dec 2023 13:59)      T4, Serum: 5.0 ug/dL [4.5 - 12.0] (12-22-23)  Thyroid Stimulating Hormone, Serum: 0.34 uIU/mL [0.27 - 4.20] (12-22-23)               Follow up: T2DM   Interval Hx/Events: hyperglycemic on steroids, on HFNC - unable to assess ROS    MEDICATIONS  (STANDING):  atorvastatin 80 milliGRAM(s) Oral at bedtime  budesonide  80 MICROgram(s)/formoterol 4.5 MICROgram(s) Inhaler 2 Puff(s) Inhalation two times a day  clotrimazole 1% Cream 1 Application(s) Topical two times a day  dextrose 5%. 1000 milliLiter(s) (50 mL/Hr) IV Continuous <Continuous>  dextrose 5%. 1000 milliLiter(s) (100 mL/Hr) IV Continuous <Continuous>  dextrose 50% Injectable 12.5 Gram(s) IV Push once  dextrose 50% Injectable 25 Gram(s) IV Push once  dextrose 50% Injectable 25 Gram(s) IV Push once  famotidine    Tablet 20 milliGRAM(s) Oral daily  furosemide   Injectable 40 milliGRAM(s) IV Push once  furosemide   Injectable 40 milliGRAM(s) IV Push daily  glucagon  Injectable 1 milliGRAM(s) IntraMuscular once  heparin  Infusion.  Unit(s)/Hr (24 mL/Hr) IV Continuous <Continuous>  insulin glargine Injectable (LANTUS) 45 Unit(s) SubCutaneous every morning  insulin glargine Injectable (LANTUS) 45 Unit(s) SubCutaneous at bedtime  insulin lispro (ADMELOG) corrective regimen sliding scale   SubCutaneous three times a day before meals  insulin lispro Injectable (ADMELOG) 35 Unit(s) SubCutaneous three times a day before meals  ipratropium    for Nebulization 500 MICROGram(s) Nebulizer every 6 hours  lactulose Syrup 20 Gram(s) Oral daily  levalbuterol Inhalation 0.63 milliGRAM(s) Inhalation every 6 hours  methylPREDNISolone sodium succinate Injectable 40 milliGRAM(s) IV Push every 12 hours  metoprolol tartrate 50 milliGRAM(s) Oral every 8 hours  nystatin Powder 1 Application(s) Topical two times a day  oxyCODONE  ER Tablet 60 milliGRAM(s) Oral every 12 hours  piperacillin/tazobactam IVPB.. 3.375 Gram(s) IV Intermittent every 8 hours  polyethylene glycol 3350 17 Gram(s) Oral daily  senna 2 Tablet(s) Oral at bedtime    MEDICATIONS  (PRN):  acetaminophen     Tablet .. 650 milliGRAM(s) Oral every 6 hours PRN Temp greater or equal to 38C (100.4F), Mild Pain (1 - 3)  albuterol    90 MICROgram(s) HFA Inhaler 2 Puff(s) Inhalation every 2 hours PRN Shortness of Breath and/or Wheezing  ALPRAZolam 0.25 milliGRAM(s) Oral every 8 hours PRN for anxiety/Sleep  dextrose Oral Gel 15 Gram(s) Oral once PRN Blood Glucose LESS THAN 70 milliGRAM(s)/deciliter  diphenhydrAMINE 25 milliGRAM(s) Oral every 6 hours PRN Rash and/or Itching  heparin   Injectable 06772 Unit(s) IV Push every 6 hours PRN For aPTT less than 40  heparin   Injectable 5000 Unit(s) IV Push every 6 hours PRN For aPTT between 40 - 57  hydrALAZINE Injectable 10 milliGRAM(s) IV Push every 4 hours PRN for systolic bp > 160  HYDROmorphone  Injectable 0.5 milliGRAM(s) IV Push every 6 hours PRN Severe Pain (7 - 10)  HYDROmorphone  Injectable 1 milliGRAM(s) IV Push every 4 hours PRN breakthrough  pain  melatonin 3 milliGRAM(s) Oral at bedtime PRN Insomnia  metoprolol tartrate Injectable 5 milliGRAM(s) IV Push every 6 hours PRN heart rate sustaining greater than 130  ondansetron Injectable 4 milliGRAM(s) IV Push every 8 hours PRN Nausea and/or Vomiting  oxyCODONE    IR 5 milliGRAM(s) Oral every 6 hours PRN Moderate Pain (4 - 6)    ALLERGIES: wool- rash, itch (Other)  adhesives (Rash)  latex (Rash)  Bactrim (Flushing)    Vital Signs Last 24 Hrs  T(C): 37.2 (25 Dec 2023 08:00), Max: 37.3 (25 Dec 2023 00:00)  T(F): 99 (25 Dec 2023 08:00), Max: 99.1 (25 Dec 2023 00:00)  HR: 100 (25 Dec 2023 10:00) (96 - 114)  BP: 150/59 (25 Dec 2023 10:00) (114/62 - 150/59)  BP(mean): 68 (25 Dec 2023 06:00) (68 - 109)  RR: 20 (25 Dec 2023 10:00) (20 - 22)  SpO2: 93% (25 Dec 2023 10:00) (92% - 97%)    Parameters below as of 25 Dec 2023 10:00  Patient On (Oxygen Delivery Method): nasal cannula, high flow  O2 Flow (L/min): 35  O2 Concentration (%): 40    Physical Exam: limited, on HFNC  General appearance: Obese  Lungs:  coarse BS  CV:tachycardic, irregular  Abdomen: Soft  Musculoskeletal: (+) edema.    LABS:                        14.9   25.74 )-----------( 313      ( 25 Dec 2023 09:20 )             46.2     12-25    133<L>  |  88<L>  |  73.3<H>  ----------------------------<  457<HH>  4.8   |  32.0<H>  |  1.85<H>    Ca    9.1      25 Dec 2023 09:20  Mg     2.6     12-24    TPro  6.5<L>  /  Alb  3.6  /  TBili  0.9  /  DBili  0.4<H>  /  AST  25  /  ALT  79<H>  /  AlkPhos  114  12-25    LIVER FUNCTIONS - ( 25 Dec 2023 09:20 )  Alb: 3.6 g/dL / Pro: 6.5 g/dL / ALK PHOS: 114 U/L / ALT: 79 U/L / AST: 25 U/L / GGT: x           A1C with Estimated Average Glucose Result: 8.3 % (12-17-23 @ 08:20)      CAPILLARY BLOOD GLUCOSE  POCT Blood Glucose.: 393 mg/dL (25 Dec 2023 10:59)  POCT Blood Glucose.: 416 mg/dL (25 Dec 2023 09:08)  POCT Blood Glucose.: 234 mg/dL (24 Dec 2023 22:07)  POCT Blood Glucose.: 311 mg/dL (24 Dec 2023 18:05)  POCT Blood Glucose.: 268 mg/dL (24 Dec 2023 13:17)  POCT Blood Glucose.: 335 mg/dL (24 Dec 2023 08:57)  POCT Blood Glucose.: 331 mg/dL (24 Dec 2023 05:55)  POCT Blood Glucose.: 337 mg/dL (24 Dec 2023 05:54)  POCT Blood Glucose.: 227 mg/dL (23 Dec 2023 22:05)  POCT Blood Glucose.: 196 mg/dL (23 Dec 2023 18:18)  POCT Blood Glucose.: 291 mg/dL (23 Dec 2023 13:59)      T4, Serum: 5.0 ug/dL [4.5 - 12.0] (12-22-23)  Thyroid Stimulating Hormone, Serum: 0.34 uIU/mL [0.27 - 4.20] (12-22-23)

## 2023-12-25 NOTE — PROGRESS NOTE ADULT - PROBLEM SELECTOR PLAN 2
- New onset  - rates better controlled in the low 100s.   - c/w metoprolol 50 mg po q8h   - s/p digoxin 500 mcg IV push x1 on 12/22/23   - [id number]   - c/w heparin drip full anticoagulation nomogram for anticoagulation - New onset  - rates better controlled in the low 100s.   - c/w metoprolol 50 mg po q8h   - s/p digoxin 500 mcg IV push x1 on 12/22/23   - [id number]   - c/w heparin drip full anticoagulation nomogram for anticoagulation

## 2023-12-26 LAB
ANION GAP SERPL CALC-SCNC: 14 MMOL/L — SIGNIFICANT CHANGE UP (ref 5–17)
ANION GAP SERPL CALC-SCNC: 14 MMOL/L — SIGNIFICANT CHANGE UP (ref 5–17)
APPEARANCE UR: CLEAR — SIGNIFICANT CHANGE UP
APPEARANCE UR: CLEAR — SIGNIFICANT CHANGE UP
APTT BLD: 53 SEC — HIGH (ref 24.5–35.6)
APTT BLD: 53 SEC — HIGH (ref 24.5–35.6)
APTT BLD: 94.2 SEC — HIGH (ref 24.5–35.6)
APTT BLD: 94.2 SEC — HIGH (ref 24.5–35.6)
APTT BLD: >200 SEC — CRITICAL HIGH (ref 24.5–35.6)
APTT BLD: >200 SEC — CRITICAL HIGH (ref 24.5–35.6)
BACTERIA # UR AUTO: NEGATIVE /HPF — SIGNIFICANT CHANGE UP
BACTERIA # UR AUTO: NEGATIVE /HPF — SIGNIFICANT CHANGE UP
BILIRUB UR-MCNC: NEGATIVE — SIGNIFICANT CHANGE UP
BILIRUB UR-MCNC: NEGATIVE — SIGNIFICANT CHANGE UP
BUN SERPL-MCNC: 75.1 MG/DL — HIGH (ref 8–20)
BUN SERPL-MCNC: 75.1 MG/DL — HIGH (ref 8–20)
CALCIUM SERPL-MCNC: 9.1 MG/DL — SIGNIFICANT CHANGE UP (ref 8.4–10.5)
CALCIUM SERPL-MCNC: 9.1 MG/DL — SIGNIFICANT CHANGE UP (ref 8.4–10.5)
CAST: 0 /LPF — SIGNIFICANT CHANGE UP (ref 0–4)
CAST: 0 /LPF — SIGNIFICANT CHANGE UP (ref 0–4)
CHLORIDE SERPL-SCNC: 88 MMOL/L — LOW (ref 96–108)
CHLORIDE SERPL-SCNC: 88 MMOL/L — LOW (ref 96–108)
CO2 SERPL-SCNC: 32 MMOL/L — HIGH (ref 22–29)
CO2 SERPL-SCNC: 32 MMOL/L — HIGH (ref 22–29)
COLOR SPEC: YELLOW — SIGNIFICANT CHANGE UP
COLOR SPEC: YELLOW — SIGNIFICANT CHANGE UP
CREAT SERPL-MCNC: 2.1 MG/DL — HIGH (ref 0.5–1.3)
CREAT SERPL-MCNC: 2.1 MG/DL — HIGH (ref 0.5–1.3)
DIFF PNL FLD: ABNORMAL
DIFF PNL FLD: ABNORMAL
EGFR: 25 ML/MIN/1.73M2 — LOW
EGFR: 25 ML/MIN/1.73M2 — LOW
GLUCOSE BLDC GLUCOMTR-MCNC: 238 MG/DL — HIGH (ref 70–99)
GLUCOSE BLDC GLUCOMTR-MCNC: 238 MG/DL — HIGH (ref 70–99)
GLUCOSE BLDC GLUCOMTR-MCNC: 270 MG/DL — HIGH (ref 70–99)
GLUCOSE BLDC GLUCOMTR-MCNC: 270 MG/DL — HIGH (ref 70–99)
GLUCOSE BLDC GLUCOMTR-MCNC: 294 MG/DL — HIGH (ref 70–99)
GLUCOSE BLDC GLUCOMTR-MCNC: 294 MG/DL — HIGH (ref 70–99)
GLUCOSE BLDC GLUCOMTR-MCNC: 315 MG/DL — HIGH (ref 70–99)
GLUCOSE BLDC GLUCOMTR-MCNC: 315 MG/DL — HIGH (ref 70–99)
GLUCOSE BLDC GLUCOMTR-MCNC: 351 MG/DL — HIGH (ref 70–99)
GLUCOSE BLDC GLUCOMTR-MCNC: 351 MG/DL — HIGH (ref 70–99)
GLUCOSE SERPL-MCNC: 364 MG/DL — HIGH (ref 70–99)
GLUCOSE SERPL-MCNC: 364 MG/DL — HIGH (ref 70–99)
GLUCOSE UR QL: 250 MG/DL
GLUCOSE UR QL: 250 MG/DL
HCT VFR BLD CALC: 44.6 % — SIGNIFICANT CHANGE UP (ref 34.5–45)
HCT VFR BLD CALC: 44.6 % — SIGNIFICANT CHANGE UP (ref 34.5–45)
HGB BLD-MCNC: 15 G/DL — SIGNIFICANT CHANGE UP (ref 11.5–15.5)
HGB BLD-MCNC: 15 G/DL — SIGNIFICANT CHANGE UP (ref 11.5–15.5)
KETONES UR-MCNC: NEGATIVE MG/DL — SIGNIFICANT CHANGE UP
KETONES UR-MCNC: NEGATIVE MG/DL — SIGNIFICANT CHANGE UP
LEUKOCYTE ESTERASE UR-ACNC: NEGATIVE — SIGNIFICANT CHANGE UP
LEUKOCYTE ESTERASE UR-ACNC: NEGATIVE — SIGNIFICANT CHANGE UP
MCHC RBC-ENTMCNC: 29.9 PG — SIGNIFICANT CHANGE UP (ref 27–34)
MCHC RBC-ENTMCNC: 29.9 PG — SIGNIFICANT CHANGE UP (ref 27–34)
MCHC RBC-ENTMCNC: 33.6 GM/DL — SIGNIFICANT CHANGE UP (ref 32–36)
MCHC RBC-ENTMCNC: 33.6 GM/DL — SIGNIFICANT CHANGE UP (ref 32–36)
MCV RBC AUTO: 89 FL — SIGNIFICANT CHANGE UP (ref 80–100)
MCV RBC AUTO: 89 FL — SIGNIFICANT CHANGE UP (ref 80–100)
NITRITE UR-MCNC: NEGATIVE — SIGNIFICANT CHANGE UP
NITRITE UR-MCNC: NEGATIVE — SIGNIFICANT CHANGE UP
PH UR: 6 — SIGNIFICANT CHANGE UP (ref 5–8)
PH UR: 6 — SIGNIFICANT CHANGE UP (ref 5–8)
PLATELET # BLD AUTO: 282 K/UL — SIGNIFICANT CHANGE UP (ref 150–400)
PLATELET # BLD AUTO: 282 K/UL — SIGNIFICANT CHANGE UP (ref 150–400)
POTASSIUM SERPL-MCNC: 4.6 MMOL/L — SIGNIFICANT CHANGE UP (ref 3.5–5.3)
POTASSIUM SERPL-MCNC: 4.6 MMOL/L — SIGNIFICANT CHANGE UP (ref 3.5–5.3)
POTASSIUM SERPL-SCNC: 4.6 MMOL/L — SIGNIFICANT CHANGE UP (ref 3.5–5.3)
POTASSIUM SERPL-SCNC: 4.6 MMOL/L — SIGNIFICANT CHANGE UP (ref 3.5–5.3)
PROT UR-MCNC: SIGNIFICANT CHANGE UP MG/DL
PROT UR-MCNC: SIGNIFICANT CHANGE UP MG/DL
RBC # BLD: 5.01 M/UL — SIGNIFICANT CHANGE UP (ref 3.8–5.2)
RBC # BLD: 5.01 M/UL — SIGNIFICANT CHANGE UP (ref 3.8–5.2)
RBC # FLD: 13.4 % — SIGNIFICANT CHANGE UP (ref 10.3–14.5)
RBC # FLD: 13.4 % — SIGNIFICANT CHANGE UP (ref 10.3–14.5)
RBC CASTS # UR COMP ASSIST: 1 /HPF — SIGNIFICANT CHANGE UP (ref 0–4)
RBC CASTS # UR COMP ASSIST: 1 /HPF — SIGNIFICANT CHANGE UP (ref 0–4)
SODIUM SERPL-SCNC: 134 MMOL/L — LOW (ref 135–145)
SODIUM SERPL-SCNC: 134 MMOL/L — LOW (ref 135–145)
SP GR SPEC: 1.01 — SIGNIFICANT CHANGE UP (ref 1–1.03)
SP GR SPEC: 1.01 — SIGNIFICANT CHANGE UP (ref 1–1.03)
SQUAMOUS # UR AUTO: 1 /HPF — SIGNIFICANT CHANGE UP (ref 0–5)
SQUAMOUS # UR AUTO: 1 /HPF — SIGNIFICANT CHANGE UP (ref 0–5)
UROBILINOGEN FLD QL: 1 MG/DL — SIGNIFICANT CHANGE UP (ref 0.2–1)
UROBILINOGEN FLD QL: 1 MG/DL — SIGNIFICANT CHANGE UP (ref 0.2–1)
WBC # BLD: 28.1 K/UL — HIGH (ref 3.8–10.5)
WBC # BLD: 28.1 K/UL — HIGH (ref 3.8–10.5)
WBC # FLD AUTO: 28.1 K/UL — HIGH (ref 3.8–10.5)
WBC # FLD AUTO: 28.1 K/UL — HIGH (ref 3.8–10.5)
WBC UR QL: 0 /HPF — SIGNIFICANT CHANGE UP (ref 0–5)
WBC UR QL: 0 /HPF — SIGNIFICANT CHANGE UP (ref 0–5)

## 2023-12-26 PROCEDURE — 99232 SBSQ HOSP IP/OBS MODERATE 35: CPT

## 2023-12-26 PROCEDURE — 99223 1ST HOSP IP/OBS HIGH 75: CPT

## 2023-12-26 PROCEDURE — 99233 SBSQ HOSP IP/OBS HIGH 50: CPT

## 2023-12-26 PROCEDURE — 71250 CT THORAX DX C-: CPT | Mod: 26

## 2023-12-26 PROCEDURE — 71045 X-RAY EXAM CHEST 1 VIEW: CPT | Mod: 26

## 2023-12-26 RX ORDER — VANCOMYCIN HCL 1 G
2000 VIAL (EA) INTRAVENOUS EVERY 12 HOURS
Refills: 0 | Status: DISCONTINUED | OUTPATIENT
Start: 2023-12-26 | End: 2023-12-26

## 2023-12-26 RX ORDER — MEROPENEM 1 G/30ML
500 INJECTION INTRAVENOUS EVERY 12 HOURS
Refills: 0 | Status: DISCONTINUED | OUTPATIENT
Start: 2023-12-26 | End: 2023-12-26

## 2023-12-26 RX ORDER — VANCOMYCIN HCL 1 G
1500 VIAL (EA) INTRAVENOUS EVERY 24 HOURS
Refills: 0 | Status: DISCONTINUED | OUTPATIENT
Start: 2023-12-26 | End: 2023-12-28

## 2023-12-26 RX ORDER — MEROPENEM 1 G/30ML
1000 INJECTION INTRAVENOUS EVERY 8 HOURS
Refills: 0 | Status: DISCONTINUED | OUTPATIENT
Start: 2023-12-26 | End: 2023-12-28

## 2023-12-26 RX ORDER — DILTIAZEM HCL 120 MG
30 CAPSULE, EXT RELEASE 24 HR ORAL ONCE
Refills: 0 | Status: COMPLETED | OUTPATIENT
Start: 2023-12-26 | End: 2023-12-26

## 2023-12-26 RX ADMIN — Medication 40 MILLIGRAM(S): at 05:39

## 2023-12-26 RX ADMIN — Medication 50 MILLIGRAM(S): at 05:39

## 2023-12-26 RX ADMIN — Medication 35 UNIT(S): at 08:02

## 2023-12-26 RX ADMIN — Medication 50 MILLIGRAM(S): at 21:33

## 2023-12-26 RX ADMIN — INSULIN GLARGINE 50 UNIT(S): 100 INJECTION, SOLUTION SUBCUTANEOUS at 08:02

## 2023-12-26 RX ADMIN — LEVALBUTEROL 0.63 MILLIGRAM(S): 1.25 SOLUTION, CONCENTRATE RESPIRATORY (INHALATION) at 13:30

## 2023-12-26 RX ADMIN — POLYETHYLENE GLYCOL 3350 17 GRAM(S): 17 POWDER, FOR SOLUTION ORAL at 12:53

## 2023-12-26 RX ADMIN — Medication 40 MILLIGRAM(S): at 05:38

## 2023-12-26 RX ADMIN — HEPARIN SODIUM 5000 UNIT(S): 5000 INJECTION INTRAVENOUS; SUBCUTANEOUS at 10:33

## 2023-12-26 RX ADMIN — OXYCODONE HYDROCHLORIDE 60 MILLIGRAM(S): 5 TABLET ORAL at 06:46

## 2023-12-26 RX ADMIN — HEPARIN SODIUM 1000 UNIT(S)/HR: 5000 INJECTION INTRAVENOUS; SUBCUTANEOUS at 17:47

## 2023-12-26 RX ADMIN — Medication 30 MILLIGRAM(S): at 14:36

## 2023-12-26 RX ADMIN — Medication 35 UNIT(S): at 17:15

## 2023-12-26 RX ADMIN — NYSTATIN CREAM 1 APPLICATION(S): 100000 CREAM TOPICAL at 17:45

## 2023-12-26 RX ADMIN — Medication 500 MICROGRAM(S): at 13:30

## 2023-12-26 RX ADMIN — Medication 300 MILLIGRAM(S): at 13:22

## 2023-12-26 RX ADMIN — HYDROMORPHONE HYDROCHLORIDE 1 MILLIGRAM(S): 2 INJECTION INTRAMUSCULAR; INTRAVENOUS; SUBCUTANEOUS at 03:43

## 2023-12-26 RX ADMIN — Medication 0.25 MILLIGRAM(S): at 08:12

## 2023-12-26 RX ADMIN — MEROPENEM 1000 MILLIGRAM(S): 1 INJECTION INTRAVENOUS at 21:34

## 2023-12-26 RX ADMIN — Medication 8: at 12:44

## 2023-12-26 RX ADMIN — MEROPENEM 1000 MILLIGRAM(S): 1 INJECTION INTRAVENOUS at 13:03

## 2023-12-26 RX ADMIN — LACTULOSE 20 GRAM(S): 10 SOLUTION ORAL at 12:39

## 2023-12-26 RX ADMIN — INSULIN GLARGINE 50 UNIT(S): 100 INJECTION, SOLUTION SUBCUTANEOUS at 22:02

## 2023-12-26 RX ADMIN — SENNA PLUS 2 TABLET(S): 8.6 TABLET ORAL at 21:30

## 2023-12-26 RX ADMIN — ONDANSETRON 4 MILLIGRAM(S): 8 TABLET, FILM COATED ORAL at 21:36

## 2023-12-26 RX ADMIN — ATORVASTATIN CALCIUM 80 MILLIGRAM(S): 80 TABLET, FILM COATED ORAL at 21:30

## 2023-12-26 RX ADMIN — OXYCODONE HYDROCHLORIDE 60 MILLIGRAM(S): 5 TABLET ORAL at 17:17

## 2023-12-26 RX ADMIN — NYSTATIN CREAM 1 APPLICATION(S): 100000 CREAM TOPICAL at 05:39

## 2023-12-26 RX ADMIN — LEVALBUTEROL 0.63 MILLIGRAM(S): 1.25 SOLUTION, CONCENTRATE RESPIRATORY (INHALATION) at 03:49

## 2023-12-26 RX ADMIN — HEPARIN SODIUM 1000 UNIT(S)/HR: 5000 INJECTION INTRAVENOUS; SUBCUTANEOUS at 20:39

## 2023-12-26 RX ADMIN — PIPERACILLIN AND TAZOBACTAM 25 GRAM(S): 4; .5 INJECTION, POWDER, LYOPHILIZED, FOR SOLUTION INTRAVENOUS at 05:41

## 2023-12-26 RX ADMIN — Medication 1 APPLICATION(S): at 17:17

## 2023-12-26 RX ADMIN — HYDROMORPHONE HYDROCHLORIDE 1 MILLIGRAM(S): 2 INJECTION INTRAMUSCULAR; INTRAVENOUS; SUBCUTANEOUS at 14:36

## 2023-12-26 RX ADMIN — Medication 40 MILLIGRAM(S): at 12:39

## 2023-12-26 RX ADMIN — Medication 35 UNIT(S): at 12:43

## 2023-12-26 RX ADMIN — HEPARIN SODIUM 1000 UNIT(S)/HR: 5000 INJECTION INTRAVENOUS; SUBCUTANEOUS at 10:36

## 2023-12-26 RX ADMIN — HYDROMORPHONE HYDROCHLORIDE 1 MILLIGRAM(S): 2 INJECTION INTRAMUSCULAR; INTRAVENOUS; SUBCUTANEOUS at 04:43

## 2023-12-26 RX ADMIN — OXYCODONE HYDROCHLORIDE 60 MILLIGRAM(S): 5 TABLET ORAL at 05:38

## 2023-12-26 RX ADMIN — Medication 1 APPLICATION(S): at 05:39

## 2023-12-26 RX ADMIN — Medication 10: at 08:02

## 2023-12-26 RX ADMIN — HEPARIN SODIUM 700 UNIT(S)/HR: 5000 INJECTION INTRAVENOUS; SUBCUTANEOUS at 03:42

## 2023-12-26 RX ADMIN — FAMOTIDINE 20 MILLIGRAM(S): 10 INJECTION INTRAVENOUS at 12:39

## 2023-12-26 RX ADMIN — Medication 500 MICROGRAM(S): at 03:49

## 2023-12-26 RX ADMIN — HEPARIN SODIUM 0 UNIT(S)/HR: 5000 INJECTION INTRAVENOUS; SUBCUTANEOUS at 02:37

## 2023-12-26 RX ADMIN — Medication 6: at 17:16

## 2023-12-26 RX ADMIN — HEPARIN SODIUM 1100 UNIT(S)/HR: 5000 INJECTION INTRAVENOUS; SUBCUTANEOUS at 00:41

## 2023-12-26 RX ADMIN — OXYCODONE HYDROCHLORIDE 5 MILLIGRAM(S): 5 TABLET ORAL at 12:52

## 2023-12-26 RX ADMIN — Medication 50 MILLIGRAM(S): at 13:03

## 2023-12-26 NOTE — PROGRESS NOTE ADULT - ASSESSMENT
67 [facility] infection and dyspnea  She  has known pulmonary hypertension with PA of 70 last summer  Hx of DVT and possible recent PE--she was anticoagulated since August  Chronic lower extermity edema, morbid obesity and does not get OOB  She is on a bronchodilator regimen for asthma with PRN oxygen  She has had recent issues with rectal fissure,  Hx of Uterine Cancer, CKD3,     Has been treated with nocturnal CPAP--she likely has Obesity Hypoventilation and will benefit ultimately from NIV to reduce hospitalization    CT today shows new atelectasis and consolidation in RML, probable bacterial pneumonia after viral illness  Vanco and MEropenem started    However, fortunately, she is alert and comfortable today when seen  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       67 [facility] RSV infection and dyspnea  She  has known pulmonary hypertension with PA of 70 last summer  Hx of DVT and possible recent PE--she was anticoagulated since August  Chronic lower extermity edema, morbid obesity and does not get OOB  She is on a bronchodilator regimen for asthma with PRN oxygen  She has had recent issues with rectal fissure,  Hx of Uterine Cancer, CKD3,     Has been treated with nocturnal CPAP--she likely has Obesity Hypoventilation and will benefit ultimately from NIV to reduce hospitalization    CT today shows new atelectasis and consolidation in RML, probable bacterial pneumonia after viral illness  Vanco and MEropenem started    However, fortunately, she is alert and comfortable today when seen  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

## 2023-12-26 NOTE — PROGRESS NOTE ADULT - SUBJECTIVE AND OBJECTIVE BOX
PULMONARY PROGRESS NOTE      [id number]    Patient is a 68y old  Female who presents with a chief complaint of SOB (26 Dec 2023 10:20)      INTERVAL HPI/OVERNIGHT EVENTS:    MEDICATIONS  (STANDING):  atorvastatin 80 milliGRAM(s) Oral at bedtime  budesonide  80 MICROgram(s)/formoterol 4.5 MICROgram(s) Inhaler 2 Puff(s) Inhalation two times a day  clotrimazole 1% Cream 1 Application(s) Topical two times a day  dextrose 5%. 1000 milliLiter(s) (50 mL/Hr) IV Continuous <Continuous>  dextrose 5%. 1000 milliLiter(s) (100 mL/Hr) IV Continuous <Continuous>  dextrose 50% Injectable 25 Gram(s) IV Push once  dextrose 50% Injectable 12.5 Gram(s) IV Push once  dextrose 50% Injectable 25 Gram(s) IV Push once  diltiazem    Tablet 30 milliGRAM(s) Oral once  famotidine    Tablet 20 milliGRAM(s) Oral daily  furosemide   Injectable 40 milliGRAM(s) IV Push daily  glucagon  Injectable 1 milliGRAM(s) IntraMuscular once  heparin  Infusion.  Unit(s)/Hr (24 mL/Hr) IV Continuous <Continuous>  insulin glargine Injectable (LANTUS) 50 Unit(s) SubCutaneous every morning  insulin glargine Injectable (LANTUS) 50 Unit(s) SubCutaneous at bedtime  insulin lispro (ADMELOG) corrective regimen sliding scale   SubCutaneous three times a day before meals  insulin lispro Injectable (ADMELOG) 35 Unit(s) SubCutaneous three times a day before meals  ipratropium    for Nebulization 500 MICROGram(s) Nebulizer every 6 hours  lactulose Syrup 20 Gram(s) Oral daily  levalbuterol Inhalation 0.63 milliGRAM(s) Inhalation every 6 hours  meropenem Injectable 1000 milliGRAM(s) IV Push every 8 hours  methylPREDNISolone sodium succinate Injectable 40 milliGRAM(s) IV Push every 24 hours  metoprolol tartrate 50 milliGRAM(s) Oral every 8 hours  nystatin Powder 1 Application(s) Topical two times a day  oxyCODONE  ER Tablet 60 milliGRAM(s) Oral every 12 hours  polyethylene glycol 3350 17 Gram(s) Oral daily  senna 2 Tablet(s) Oral at bedtime  vancomycin  IVPB 1500 milliGRAM(s) IV Intermittent every 24 hours      MEDICATIONS  (PRN):  acetaminophen     Tablet .. 650 milliGRAM(s) Oral every 6 hours PRN Temp greater or equal to 38C (100.4F), Mild Pain (1 - 3)  albuterol    90 MICROgram(s) HFA Inhaler 2 Puff(s) Inhalation every 2 hours PRN Shortness of Breath and/or Wheezing  ALPRAZolam 0.25 milliGRAM(s) Oral every 8 hours PRN for anxiety/Sleep  dextrose Oral Gel 15 Gram(s) Oral once PRN Blood Glucose LESS THAN 70 milliGRAM(s)/deciliter  diphenhydrAMINE 25 milliGRAM(s) Oral every 6 hours PRN Rash and/or Itching  heparin   Injectable 61797 Unit(s) IV Push every 6 hours PRN For aPTT less than 40  heparin   Injectable 5000 Unit(s) IV Push every 6 hours PRN For aPTT between 40 - 57  hydrALAZINE Injectable 10 milliGRAM(s) IV Push every 4 hours PRN for systolic bp > 160  HYDROmorphone  Injectable 0.5 milliGRAM(s) IV Push every 6 hours PRN Severe Pain (7 - 10)  HYDROmorphone  Injectable 1 milliGRAM(s) IV Push every 4 hours PRN breakthrough  pain  melatonin 3 milliGRAM(s) Oral at bedtime PRN Insomnia  metoprolol tartrate Injectable 5 milliGRAM(s) IV Push every 6 hours PRN heart rate sustaining greater than 130  ondansetron Injectable 4 milliGRAM(s) IV Push every 8 hours PRN Nausea and/or Vomiting  oxyCODONE    IR 5 milliGRAM(s) Oral every 6 hours PRN Moderate Pain (4 - 6)      Allergies    wool- rash, itch (Other)  adhesives (Rash)  latex (Rash)  Bactrim (Flushing)    Intolerances        PAST MEDICAL & SURGICAL HISTORY:  Diabetes Mellitus Type II      HTN (Hypertension)      Endometrial Hyperplasia      Cervical Stenosis of Spine      Spinal Stenosis, Lumbar      Deep Vein Thrombosis (DVT)  Left leg, , treated and resolved      Dyslipidemia      Cataract      Morbid Obesity      Vitamin D deficiency      Insomnia      CKD (chronic kidney disease)  ~ III      Congestive heart failure  ~ HFpEF      Uterine cancer      Asthma      On home oxygen therapy      Gait difficulty  ~ u ses walker      Cataract extracted with lens implant 1998  Right      C Section       Cholecystectomy/appendectomy @ age 25      D&C x2 s      D&C 2008  hysteroscopy, endometrial hyperplasia, 2009      Cervical Spinal Stenosis surgery x2 (2002, 2002)      Tonsillectomy as a child      Endometrial biopsy 09      H/O laser iridotomy  left eye, 2016      H/O colonoscopy        S/P total abdominal hysterectomy and bilateral salpingo-oophorectomy      S/P appendectomy      S/P cholecystectomy          SOCIAL HISTORY  Smoking History:       REVIEW OF SYSTEMS:    CONSTITUTIONAL:  No distress    HEENT:  Eyes:  No diplopia or blurred vision. ENT:  No earache, sore throat or runny nose.    CARDIOVASCULAR:  No pressure, squeezing, tightness, heaviness or aching about the chest; no palpitations.    RESPIRATORY:  No cough, shortness of breath, PND or orthopnea. Mild SOBOE    GASTROINTESTINAL:  No nausea, vomiting or diarrhea.    GENITOURINARY:  No dysuria, frequency or urgency.    MUSCULOSKELETAL:  No joint pain    SKIN:  No new lesions.    NEUROLOGIC:  No paresthesias, fasciculations, seizures or weakness.    PSYCHIATRIC:  No disorder of thought or mood.    ENDOCRINE:  No heat or cold intolerance, polyuria or polydipsia.    HEMATOLOGICAL:  No easy bruising or bleeding.     Vital Signs Last 24 Hrs  T(C): 37.1 (26 Dec 2023 07:53), Max: 37.2 (25 Dec 2023 18:00)  T(F): 98.8 (26 Dec 2023 07:53), Max: 99 (25 Dec 2023 18:00)  HR: 104 (26 Dec 2023 12:00) (98 - 120)  BP: 122/56 (26 Dec 2023 12:00) (116/67 - 150/60)  BP(mean): 67 (26 Dec 2023 12:00) (61 - 96)  RR: 21 (26 Dec 2023 12:00) (18 - 24)  SpO2: 98% (26 Dec 2023 12:00) (92% - 100%)    Parameters below as of 26 Dec 2023 12:00  Patient On (Oxygen Delivery Method): nasal cannula  O2 Flow (L/min): 5      PHYSICAL EXAMINATION:    GENERAL: The patient is awake and alert in no apparent distress.     HEENT: Head is normocephalic and atraumatic. Extraocular muscles are intact. Mucous membranes are moist.    NECK: Supple.    LUNGS: Clear to auscultation without wheezing, rales or rhonchi; respirations unlabored    HEART: Regular rate and rhythm without murmur.    ABDOMEN: Soft, nontender, and nondistended.      EXTREMITIES: Without any cyanosis, clubbing, rash, lesions or edema.    NEUROLOGIC: Grossly intact.    SKIN: No ulceration or induration present.      LABS:                        15.0   28.10 )-----------( 282      ( 26 Dec 2023 06:21 )             44.6         134<L>  |  88<L>  |  75.1<H>  ----------------------------<  364<H>  4.6   |  32.0<H>  |  2.10<H>    Ca    9.1      26 Dec 2023 06:21    TPro  6.5<L>  /  Alb  3.6  /  TBili  0.9  /  DBili  0.4<H>  /  AST  25  /  ALT  79<H>  /  AlkPhos  114  12-    PTT - ( 26 Dec 2023 09:31 )  PTT:53.0 sec  Urinalysis Basic - ( 26 Dec 2023 09:38 )    Color: Yellow / Appearance: Clear / S.014 / pH: x  Gluc: x / Ketone: Negative mg/dL  / Bili: Negative / Urobili: 1.0 mg/dL   Blood: x / Protein: Trace mg/dL / Nitrite: Negative   Leuk Esterase: Negative / RBC: 1 /HPF / WBC 0 /HPF   Sq Epi: x / Non Sq Epi: 1 /HPF / Bacteria: Negative /HPF                      MICROBIOLOGY:    RADIOLOGY & ADDITIONAL STUDIES:< from: CT Chest No Cont (23 @ 10:07) >  ACC: [id number] EXAM:  CT CHEST   ORDERED BY: RADHIKA HOPE     PROCEDURE DATE:  2023          INTERPRETATION:  HISTORY: Acute hypoxic respiratory failure. Worsening   WBC. Worsening pneumonia.    EXAMINATION: CT CHEST was performed without IV contrast.    COMPARISON: 2023 CT chest.    FINDINGS:    AIRWAYS, LUNGS, PLEURA: Interval worsening of right middle lobe   parenchymal consolidation with volume loss. Mild atelectasis in the   lingula and left base.    Trachea and mainstem bronchipatent. No pleural effusion.    MEDIASTINUM: Normal heart size. No pericardial effusion. Thoracic aorta   normal caliber.  No large mediastinal lymph nodes.    IMAGED ABDOMEN: Cholecystectomy. Right adrenal nodule as on 2020 CT   chest.    SOFT TISSUES: Unremarkable.    BONES: Unremarkable.      IMPRESSION:.    Interval worsening of right middle lobe consolidation, likely pneumonia.    --- End of Report ---    < end of copied text >   PULMONARY PROGRESS NOTE      [id number]    Patient is a 68y old  Female who presents with a chief complaint of SOB (26 Dec 2023 10:20)      INTERVAL HPI/OVERNIGHT EVENTS:    MEDICATIONS  (STANDING):  atorvastatin 80 milliGRAM(s) Oral at bedtime  budesonide  80 MICROgram(s)/formoterol 4.5 MICROgram(s) Inhaler 2 Puff(s) Inhalation two times a day  clotrimazole 1% Cream 1 Application(s) Topical two times a day  dextrose 5%. 1000 milliLiter(s) (50 mL/Hr) IV Continuous <Continuous>  dextrose 5%. 1000 milliLiter(s) (100 mL/Hr) IV Continuous <Continuous>  dextrose 50% Injectable 25 Gram(s) IV Push once  dextrose 50% Injectable 12.5 Gram(s) IV Push once  dextrose 50% Injectable 25 Gram(s) IV Push once  diltiazem    Tablet 30 milliGRAM(s) Oral once  famotidine    Tablet 20 milliGRAM(s) Oral daily  furosemide   Injectable 40 milliGRAM(s) IV Push daily  glucagon  Injectable 1 milliGRAM(s) IntraMuscular once  heparin  Infusion.  Unit(s)/Hr (24 mL/Hr) IV Continuous <Continuous>  insulin glargine Injectable (LANTUS) 50 Unit(s) SubCutaneous every morning  insulin glargine Injectable (LANTUS) 50 Unit(s) SubCutaneous at bedtime  insulin lispro (ADMELOG) corrective regimen sliding scale   SubCutaneous three times a day before meals  insulin lispro Injectable (ADMELOG) 35 Unit(s) SubCutaneous three times a day before meals  ipratropium    for Nebulization 500 MICROGram(s) Nebulizer every 6 hours  lactulose Syrup 20 Gram(s) Oral daily  levalbuterol Inhalation 0.63 milliGRAM(s) Inhalation every 6 hours  meropenem Injectable 1000 milliGRAM(s) IV Push every 8 hours  methylPREDNISolone sodium succinate Injectable 40 milliGRAM(s) IV Push every 24 hours  metoprolol tartrate 50 milliGRAM(s) Oral every 8 hours  nystatin Powder 1 Application(s) Topical two times a day  oxyCODONE  ER Tablet 60 milliGRAM(s) Oral every 12 hours  polyethylene glycol 3350 17 Gram(s) Oral daily  senna 2 Tablet(s) Oral at bedtime  vancomycin  IVPB 1500 milliGRAM(s) IV Intermittent every 24 hours      MEDICATIONS  (PRN):  acetaminophen     Tablet .. 650 milliGRAM(s) Oral every 6 hours PRN Temp greater or equal to 38C (100.4F), Mild Pain (1 - 3)  albuterol    90 MICROgram(s) HFA Inhaler 2 Puff(s) Inhalation every 2 hours PRN Shortness of Breath and/or Wheezing  ALPRAZolam 0.25 milliGRAM(s) Oral every 8 hours PRN for anxiety/Sleep  dextrose Oral Gel 15 Gram(s) Oral once PRN Blood Glucose LESS THAN 70 milliGRAM(s)/deciliter  diphenhydrAMINE 25 milliGRAM(s) Oral every 6 hours PRN Rash and/or Itching  heparin   Injectable 46283 Unit(s) IV Push every 6 hours PRN For aPTT less than 40  heparin   Injectable 5000 Unit(s) IV Push every 6 hours PRN For aPTT between 40 - 57  hydrALAZINE Injectable 10 milliGRAM(s) IV Push every 4 hours PRN for systolic bp > 160  HYDROmorphone  Injectable 0.5 milliGRAM(s) IV Push every 6 hours PRN Severe Pain (7 - 10)  HYDROmorphone  Injectable 1 milliGRAM(s) IV Push every 4 hours PRN breakthrough  pain  melatonin 3 milliGRAM(s) Oral at bedtime PRN Insomnia  metoprolol tartrate Injectable 5 milliGRAM(s) IV Push every 6 hours PRN heart rate sustaining greater than 130  ondansetron Injectable 4 milliGRAM(s) IV Push every 8 hours PRN Nausea and/or Vomiting  oxyCODONE    IR 5 milliGRAM(s) Oral every 6 hours PRN Moderate Pain (4 - 6)      Allergies    wool- rash, itch (Other)  adhesives (Rash)  latex (Rash)  Bactrim (Flushing)    Intolerances        PAST MEDICAL & SURGICAL HISTORY:  Diabetes Mellitus Type II      HTN (Hypertension)      Endometrial Hyperplasia      Cervical Stenosis of Spine      Spinal Stenosis, Lumbar      Deep Vein Thrombosis (DVT)  Left leg, , treated and resolved      Dyslipidemia      Cataract      Morbid Obesity      Vitamin D deficiency      Insomnia      CKD (chronic kidney disease)  ~ III      Congestive heart failure  ~ HFpEF      Uterine cancer      Asthma      On home oxygen therapy      Gait difficulty  ~ u ses walker      Cataract extracted with lens implant 1998  Right      C Section       Cholecystectomy/appendectomy @ age 25      D&C x2 s      D&C 2008  hysteroscopy, endometrial hyperplasia, 2009      Cervical Spinal Stenosis surgery x2 (2002, 2002)      Tonsillectomy as a child      Endometrial biopsy 09      H/O laser iridotomy  left eye, 2016      H/O colonoscopy        S/P total abdominal hysterectomy and bilateral salpingo-oophorectomy      S/P appendectomy      S/P cholecystectomy          SOCIAL HISTORY  Smoking History:       REVIEW OF SYSTEMS:    CONSTITUTIONAL:  No distress    HEENT:  Eyes:  No diplopia or blurred vision. ENT:  No earache, sore throat or runny nose.    CARDIOVASCULAR:  No pressure, squeezing, tightness, heaviness or aching about the chest; no palpitations.    RESPIRATORY:  No cough, shortness of breath, PND or orthopnea. Mild SOBOE    GASTROINTESTINAL:  No nausea, vomiting or diarrhea.    GENITOURINARY:  No dysuria, frequency or urgency.    MUSCULOSKELETAL:  No joint pain    SKIN:  No new lesions.    NEUROLOGIC:  No paresthesias, fasciculations, seizures or weakness.    PSYCHIATRIC:  No disorder of thought or mood.    ENDOCRINE:  No heat or cold intolerance, polyuria or polydipsia.    HEMATOLOGICAL:  No easy bruising or bleeding.     Vital Signs Last 24 Hrs  T(C): 37.1 (26 Dec 2023 07:53), Max: 37.2 (25 Dec 2023 18:00)  T(F): 98.8 (26 Dec 2023 07:53), Max: 99 (25 Dec 2023 18:00)  HR: 104 (26 Dec 2023 12:00) (98 - 120)  BP: 122/56 (26 Dec 2023 12:00) (116/67 - 150/60)  BP(mean): 67 (26 Dec 2023 12:00) (61 - 96)  RR: 21 (26 Dec 2023 12:00) (18 - 24)  SpO2: 98% (26 Dec 2023 12:00) (92% - 100%)    Parameters below as of 26 Dec 2023 12:00  Patient On (Oxygen Delivery Method): nasal cannula  O2 Flow (L/min): 5      PHYSICAL EXAMINATION:    GENERAL: The patient is awake and alert in no apparent distress.     HEENT: Head is normocephalic and atraumatic. Extraocular muscles are intact. Mucous membranes are moist.    NECK: Supple.    LUNGS: Clear to auscultation without wheezing, rales or rhonchi; respirations unlabored    HEART: Regular rate and rhythm without murmur.    ABDOMEN: Soft, nontender, and nondistended.      EXTREMITIES: Without any cyanosis, clubbing, rash, lesions or edema.    NEUROLOGIC: Grossly intact.    SKIN: No ulceration or induration present.      LABS:                        15.0   28.10 )-----------( 282      ( 26 Dec 2023 06:21 )             44.6         134<L>  |  88<L>  |  75.1<H>  ----------------------------<  364<H>  4.6   |  32.0<H>  |  2.10<H>    Ca    9.1      26 Dec 2023 06:21    TPro  6.5<L>  /  Alb  3.6  /  TBili  0.9  /  DBili  0.4<H>  /  AST  25  /  ALT  79<H>  /  AlkPhos  114  12-    PTT - ( 26 Dec 2023 09:31 )  PTT:53.0 sec  Urinalysis Basic - ( 26 Dec 2023 09:38 )    Color: Yellow / Appearance: Clear / S.014 / pH: x  Gluc: x / Ketone: Negative mg/dL  / Bili: Negative / Urobili: 1.0 mg/dL   Blood: x / Protein: Trace mg/dL / Nitrite: Negative   Leuk Esterase: Negative / RBC: 1 /HPF / WBC 0 /HPF   Sq Epi: x / Non Sq Epi: 1 /HPF / Bacteria: Negative /HPF                      MICROBIOLOGY:    RADIOLOGY & ADDITIONAL STUDIES:< from: CT Chest No Cont (23 @ 10:07) >  ACC: [id number] EXAM:  CT CHEST   ORDERED BY: RADHIKA HOPE     PROCEDURE DATE:  2023          INTERPRETATION:  HISTORY: Acute hypoxic respiratory failure. Worsening   WBC. Worsening pneumonia.    EXAMINATION: CT CHEST was performed without IV contrast.    COMPARISON: 2023 CT chest.    FINDINGS:    AIRWAYS, LUNGS, PLEURA: Interval worsening of right middle lobe   parenchymal consolidation with volume loss. Mild atelectasis in the   lingula and left base.    Trachea and mainstem bronchipatent. No pleural effusion.    MEDIASTINUM: Normal heart size. No pericardial effusion. Thoracic aorta   normal caliber.  No large mediastinal lymph nodes.    IMAGED ABDOMEN: Cholecystectomy. Right adrenal nodule as on 2020 CT   chest.    SOFT TISSUES: Unremarkable.    BONES: Unremarkable.      IMPRESSION:.    Interval worsening of right middle lobe consolidation, likely pneumonia.    --- End of Report ---    < end of copied text >

## 2023-12-26 NOTE — PROGRESS NOTE ADULT - SUBJECTIVE AND OBJECTIVE BOX
Follow up: diabetes  Interval Hx/Events: no issues, reports good appetite, concerned about blood sugars and weight  Denies CP/palp, SOB improved. No abd pain/N/V.    MEDICATIONS  (STANDING):  atorvastatin 80 milliGRAM(s) Oral at bedtime  budesonide  80 MICROgram(s)/formoterol 4.5 MICROgram(s) Inhaler 2 Puff(s) Inhalation two times a day  clotrimazole 1% Cream 1 Application(s) Topical two times a day  dextrose 5%. 1000 milliLiter(s) (50 mL/Hr) IV Continuous <Continuous>  dextrose 5%. 1000 milliLiter(s) (100 mL/Hr) IV Continuous <Continuous>  dextrose 50% Injectable 25 Gram(s) IV Push once  dextrose 50% Injectable 12.5 Gram(s) IV Push once  dextrose 50% Injectable 25 Gram(s) IV Push once  famotidine    Tablet 20 milliGRAM(s) Oral daily  glucagon  Injectable 1 milliGRAM(s) IntraMuscular once  heparin  Infusion.  Unit(s)/Hr (24 mL/Hr) IV Continuous <Continuous>  insulin glargine Injectable (LANTUS) 50 Unit(s) SubCutaneous at bedtime  insulin glargine Injectable (LANTUS) 50 Unit(s) SubCutaneous every morning  insulin lispro (ADMELOG) corrective regimen sliding scale   SubCutaneous three times a day before meals  insulin lispro Injectable (ADMELOG) 35 Unit(s) SubCutaneous three times a day before meals  ipratropium    for Nebulization 500 MICROGram(s) Nebulizer every 6 hours  lactulose Syrup 20 Gram(s) Oral daily  levalbuterol Inhalation 0.63 milliGRAM(s) Inhalation every 6 hours  meropenem Injectable 1000 milliGRAM(s) IV Push every 8 hours  methylPREDNISolone sodium succinate Injectable 40 milliGRAM(s) IV Push every 24 hours  metoprolol tartrate 50 milliGRAM(s) Oral every 8 hours  nystatin Powder 1 Application(s) Topical two times a day  oxyCODONE  ER Tablet 60 milliGRAM(s) Oral every 12 hours  polyethylene glycol 3350 17 Gram(s) Oral daily  senna 2 Tablet(s) Oral at bedtime  vancomycin  IVPB 1500 milliGRAM(s) IV Intermittent every 24 hours    MEDICATIONS  (PRN):  acetaminophen     Tablet .. 650 milliGRAM(s) Oral every 6 hours PRN Temp greater or equal to 38C (100.4F), Mild Pain (1 - 3)  albuterol    90 MICROgram(s) HFA Inhaler 2 Puff(s) Inhalation every 2 hours PRN Shortness of Breath and/or Wheezing  ALPRAZolam 0.25 milliGRAM(s) Oral every 8 hours PRN for anxiety/Sleep  dextrose Oral Gel 15 Gram(s) Oral once PRN Blood Glucose LESS THAN 70 milliGRAM(s)/deciliter  diphenhydrAMINE 25 milliGRAM(s) Oral every 6 hours PRN Rash and/or Itching  heparin   Injectable 5000 Unit(s) IV Push every 6 hours PRN For aPTT between 40 - 57  heparin   Injectable 97603 Unit(s) IV Push every 6 hours PRN For aPTT less than 40  hydrALAZINE Injectable 10 milliGRAM(s) IV Push every 4 hours PRN for systolic bp > 160  HYDROmorphone  Injectable 0.5 milliGRAM(s) IV Push every 6 hours PRN Severe Pain (7 - 10)  HYDROmorphone  Injectable 1 milliGRAM(s) IV Push every 4 hours PRN breakthrough  pain  melatonin 3 milliGRAM(s) Oral at bedtime PRN Insomnia  metoprolol tartrate Injectable 5 milliGRAM(s) IV Push every 6 hours PRN heart rate sustaining greater than 130  ondansetron Injectable 4 milliGRAM(s) IV Push every 8 hours PRN Nausea and/or Vomiting  oxyCODONE    IR 5 milliGRAM(s) Oral every 6 hours PRN Moderate Pain (4 - 6)      ALLERGIES: wool- rash, itch (Other)  adhesives (Rash)  latex (Rash)  Bactrim (Flushing)    Vital Signs Last 24 Hrs  T(C): 37.3 (26 Dec 2023 16:00), Max: 37.3 (26 Dec 2023 16:00)  T(F): 99.1 (26 Dec 2023 16:00), Max: 99.1 (26 Dec 2023 16:00)  HR: 99 (26 Dec 2023 14:00) (99 - 120)  BP: 140/44 (26 Dec 2023 14:00) (116/67 - 150/60)  BP(mean): 67 (26 Dec 2023 14:00) (61 - 96)  RR: 19 (26 Dec 2023 14:00) (18 - 24)  SpO2: 99% (26 Dec 2023 14:00) (92% - 100%)    Parameters below as of 26 Dec 2023 14:00  Patient On (Oxygen Delivery Method): nasal cannula  O2 Flow (L/min): 4      Physical Exam:  General appearance: Obese, NAD  Eyes: EOMI  Lungs: Normal respiratory excursion. Lungs clear no w/r/r  CV: Normal S1S2, regular. No m/r/g.   Abdomen: Soft, nontender  Musculoskeletal: (+) edema.  Skin: Warm and moist.   Neuro: Cranial nerves intact.   Psych: Normal affect, good judgement/insight      LABS:                        15.0   28.10 )-----------( 282      ( 26 Dec 2023 06:21 )             44.6     12-26    134<L>  |  88<L>  |  75.1<H>  ----------------------------<  364<H>  4.6   |  32.0<H>  |  2.10<H>    Ca    9.1      26 Dec 2023 06:21    TPro  6.5<L>  /  Alb  3.6  /  TBili  0.9  /  DBili  0.4<H>  /  AST  25  /  ALT  79<H>  /  AlkPhos  114  12-25    LIVER FUNCTIONS - ( 25 Dec 2023 09:20 )  Alb: 3.6 g/dL / Pro: 6.5 g/dL / ALK PHOS: 114 U/L / ALT: 79 U/L / AST: 25 U/L / GGT: x           A1C with Estimated Average Glucose Result: 8.3 % (12-17-23 @ 08:20)    CAPILLARY BLOOD GLUCOSE  POCT Blood Glucose.: 238 mg/dL (26 Dec 2023 17:14)  POCT Blood Glucose.: 294 mg/dL (26 Dec 2023 12:43)  POCT Blood Glucose.: 315 mg/dL (26 Dec 2023 08:00)  POCT Blood Glucose.: 351 mg/dL (26 Dec 2023 05:47)  POCT Blood Glucose.: 306 mg/dL (25 Dec 2023 22:29)  POCT Blood Glucose.: 325 mg/dL (25 Dec 2023 18:10)  POCT Blood Glucose.: 317 mg/dL (25 Dec 2023 13:53)  POCT Blood Glucose.: 393 mg/dL (25 Dec 2023 10:59)  POCT Blood Glucose.: 416 mg/dL (25 Dec 2023 09:08)  POCT Blood Glucose.: 234 mg/dL (24 Dec 2023 22:07)  POCT Blood Glucose.: 311 mg/dL (24 Dec 2023 18:05)      T4, Serum: 5.0 ug/dL [4.5 - 12.0] (12-22-23)  Thyroid Stimulating Hormone, Serum: 0.34 uIU/mL [0.27 - 4.20] (12-22-23)  Thyroid Stimulating Hormone, Serum: 1.51 uIU/mL [0.27 - 4.20] (12-16-23)  Thyroid Stimulating Hormone, Serum: 3.02 uIU/mL [0.27 - 4.20] (12-16-23)       Follow up: diabetes  Interval Hx/Events: no issues, reports good appetite, concerned about blood sugars and weight  Denies CP/palp, SOB improved. No abd pain/N/V.    MEDICATIONS  (STANDING):  atorvastatin 80 milliGRAM(s) Oral at bedtime  budesonide  80 MICROgram(s)/formoterol 4.5 MICROgram(s) Inhaler 2 Puff(s) Inhalation two times a day  clotrimazole 1% Cream 1 Application(s) Topical two times a day  dextrose 5%. 1000 milliLiter(s) (50 mL/Hr) IV Continuous <Continuous>  dextrose 5%. 1000 milliLiter(s) (100 mL/Hr) IV Continuous <Continuous>  dextrose 50% Injectable 25 Gram(s) IV Push once  dextrose 50% Injectable 12.5 Gram(s) IV Push once  dextrose 50% Injectable 25 Gram(s) IV Push once  famotidine    Tablet 20 milliGRAM(s) Oral daily  glucagon  Injectable 1 milliGRAM(s) IntraMuscular once  heparin  Infusion.  Unit(s)/Hr (24 mL/Hr) IV Continuous <Continuous>  insulin glargine Injectable (LANTUS) 50 Unit(s) SubCutaneous at bedtime  insulin glargine Injectable (LANTUS) 50 Unit(s) SubCutaneous every morning  insulin lispro (ADMELOG) corrective regimen sliding scale   SubCutaneous three times a day before meals  insulin lispro Injectable (ADMELOG) 35 Unit(s) SubCutaneous three times a day before meals  ipratropium    for Nebulization 500 MICROGram(s) Nebulizer every 6 hours  lactulose Syrup 20 Gram(s) Oral daily  levalbuterol Inhalation 0.63 milliGRAM(s) Inhalation every 6 hours  meropenem Injectable 1000 milliGRAM(s) IV Push every 8 hours  methylPREDNISolone sodium succinate Injectable 40 milliGRAM(s) IV Push every 24 hours  metoprolol tartrate 50 milliGRAM(s) Oral every 8 hours  nystatin Powder 1 Application(s) Topical two times a day  oxyCODONE  ER Tablet 60 milliGRAM(s) Oral every 12 hours  polyethylene glycol 3350 17 Gram(s) Oral daily  senna 2 Tablet(s) Oral at bedtime  vancomycin  IVPB 1500 milliGRAM(s) IV Intermittent every 24 hours    MEDICATIONS  (PRN):  acetaminophen     Tablet .. 650 milliGRAM(s) Oral every 6 hours PRN Temp greater or equal to 38C (100.4F), Mild Pain (1 - 3)  albuterol    90 MICROgram(s) HFA Inhaler 2 Puff(s) Inhalation every 2 hours PRN Shortness of Breath and/or Wheezing  ALPRAZolam 0.25 milliGRAM(s) Oral every 8 hours PRN for anxiety/Sleep  dextrose Oral Gel 15 Gram(s) Oral once PRN Blood Glucose LESS THAN 70 milliGRAM(s)/deciliter  diphenhydrAMINE 25 milliGRAM(s) Oral every 6 hours PRN Rash and/or Itching  heparin   Injectable 5000 Unit(s) IV Push every 6 hours PRN For aPTT between 40 - 57  heparin   Injectable 71922 Unit(s) IV Push every 6 hours PRN For aPTT less than 40  hydrALAZINE Injectable 10 milliGRAM(s) IV Push every 4 hours PRN for systolic bp > 160  HYDROmorphone  Injectable 0.5 milliGRAM(s) IV Push every 6 hours PRN Severe Pain (7 - 10)  HYDROmorphone  Injectable 1 milliGRAM(s) IV Push every 4 hours PRN breakthrough  pain  melatonin 3 milliGRAM(s) Oral at bedtime PRN Insomnia  metoprolol tartrate Injectable 5 milliGRAM(s) IV Push every 6 hours PRN heart rate sustaining greater than 130  ondansetron Injectable 4 milliGRAM(s) IV Push every 8 hours PRN Nausea and/or Vomiting  oxyCODONE    IR 5 milliGRAM(s) Oral every 6 hours PRN Moderate Pain (4 - 6)      ALLERGIES: wool- rash, itch (Other)  adhesives (Rash)  latex (Rash)  Bactrim (Flushing)    Vital Signs Last 24 Hrs  T(C): 37.3 (26 Dec 2023 16:00), Max: 37.3 (26 Dec 2023 16:00)  T(F): 99.1 (26 Dec 2023 16:00), Max: 99.1 (26 Dec 2023 16:00)  HR: 99 (26 Dec 2023 14:00) (99 - 120)  BP: 140/44 (26 Dec 2023 14:00) (116/67 - 150/60)  BP(mean): 67 (26 Dec 2023 14:00) (61 - 96)  RR: 19 (26 Dec 2023 14:00) (18 - 24)  SpO2: 99% (26 Dec 2023 14:00) (92% - 100%)    Parameters below as of 26 Dec 2023 14:00  Patient On (Oxygen Delivery Method): nasal cannula  O2 Flow (L/min): 4      Physical Exam:  General appearance: Obese, NAD  Eyes: EOMI  Lungs: Normal respiratory excursion. Lungs clear no w/r/r  CV: Normal S1S2, regular. No m/r/g.   Abdomen: Soft, nontender  Musculoskeletal: (+) edema.  Skin: Warm and moist.   Neuro: Cranial nerves intact.   Psych: Normal affect, good judgement/insight      LABS:                        15.0   28.10 )-----------( 282      ( 26 Dec 2023 06:21 )             44.6     12-26    134<L>  |  88<L>  |  75.1<H>  ----------------------------<  364<H>  4.6   |  32.0<H>  |  2.10<H>    Ca    9.1      26 Dec 2023 06:21    TPro  6.5<L>  /  Alb  3.6  /  TBili  0.9  /  DBili  0.4<H>  /  AST  25  /  ALT  79<H>  /  AlkPhos  114  12-25    LIVER FUNCTIONS - ( 25 Dec 2023 09:20 )  Alb: 3.6 g/dL / Pro: 6.5 g/dL / ALK PHOS: 114 U/L / ALT: 79 U/L / AST: 25 U/L / GGT: x           A1C with Estimated Average Glucose Result: 8.3 % (12-17-23 @ 08:20)    CAPILLARY BLOOD GLUCOSE  POCT Blood Glucose.: 238 mg/dL (26 Dec 2023 17:14)  POCT Blood Glucose.: 294 mg/dL (26 Dec 2023 12:43)  POCT Blood Glucose.: 315 mg/dL (26 Dec 2023 08:00)  POCT Blood Glucose.: 351 mg/dL (26 Dec 2023 05:47)  POCT Blood Glucose.: 306 mg/dL (25 Dec 2023 22:29)  POCT Blood Glucose.: 325 mg/dL (25 Dec 2023 18:10)  POCT Blood Glucose.: 317 mg/dL (25 Dec 2023 13:53)  POCT Blood Glucose.: 393 mg/dL (25 Dec 2023 10:59)  POCT Blood Glucose.: 416 mg/dL (25 Dec 2023 09:08)  POCT Blood Glucose.: 234 mg/dL (24 Dec 2023 22:07)  POCT Blood Glucose.: 311 mg/dL (24 Dec 2023 18:05)      T4, Serum: 5.0 ug/dL [4.5 - 12.0] (12-22-23)  Thyroid Stimulating Hormone, Serum: 0.34 uIU/mL [0.27 - 4.20] (12-22-23)  Thyroid Stimulating Hormone, Serum: 1.51 uIU/mL [0.27 - 4.20] (12-16-23)  Thyroid Stimulating Hormone, Serum: 3.02 uIU/mL [0.27 - 4.20] (12-16-23)

## 2023-12-26 NOTE — PROVIDER CONTACT NOTE (CRITICAL VALUE NOTIFICATION) - ACTION/TREATMENT ORDERED:
Follow heparin nomogram and repeat aptt in 6 hours.
will wean steroids starting today. continue accuchecks as ordered
SOPHIE Cyr made aware regarding ptt and advise to follow nomogram
will follow nomogram and repeat ptt in 6 hours

## 2023-12-26 NOTE — PROGRESS NOTE ADULT - PROBLEM SELECTOR PLAN 1
- Pt still appears fluid overloaded   - TTE with preserved EF  - BNP downtrending from 1.6K to 712  - Trop <50  - GDMT: ACEi/ARBs on hold due to CKD. c/w lasix 40 mg IV push daily. c/w metoprolol 50 mg q8h PO.  - Will reintroduce other GDMT medications post-cath.   - Eventual R/LHC once respiratory status improves and is back to baseline.   - Monitor on telemetry.  Strict i/o and daily weights.  Keep K > 4, Mg > 2.  Monitor renal function with ongoing diuresis.   - wean off oxygen as tolerated

## 2023-12-26 NOTE — CONSULT NOTE ADULT - ASSESSMENT
68 year old female with PMH of morbidly obesity (500+lbs), DM, HTN, HLD, asthma, HFpEF, chronic hypoxic respiratory failure on 4L home oxygen, CKD, spinal stenosis (cervical spine + lumbar spine) and hx of DVT presents with worsening SOB. . Nephrology is consulted for worsening NED on CKD.     Ned onn CKD stage III    -Baseline creatinine is ~1.6-2.0 mg/dL in Oct 2022 (as per [facility])  -On admission, creatinine was 1.89mg/dL     -SCr up trending since admission- peaked at 2.3  - UA with trace blood, negative for rbcs, protein  - Renal US reviewed; no HDN    Ned likely in setting of CHF exacerbation     68 year old female with PMH of morbidly obesity (500+lbs), DM, HTN, HLD, asthma, HFpEF, chronic hypoxic respiratory failure on 4L home oxygen, CKD, spinal stenosis (cervical spine + lumbar spine) and hx of DVT presents with worsening SOB. . Nephrology is consulted for worsening NED on CKD.     Ned onn CKD stage III    -Baseline creatinine is ~1.6-2.0 mg/dL in Oct 2022 (as per [facility])  -On admission, creatinine was 1.89mg/dL     -SCr up trending since admission- peaked at 2.3  - UA with trace blood, negative for rbcs, protein  - Renal US reviewed; no HDN    Ned likely in setting of CHF exacerbation     68 year old female with PMH of morbidly obesity (500+lbs), DM, HTN, HLD, asthma, HFpEF, chronic hypoxic respiratory failure on 4L home oxygen, CKD, spinal stenosis (cervical spine + lumbar spine) and hx of DVT presents with worsening SOB. . Nephrology is consulted for worsening NED on CKD.     Ned on CKD stage III  -Baseline creatinine is ~1.6- 2.0 mg/dL in Oct 2022 (as per [facility])  -On admission, creatinine was1.4  -SCr up trending since admission-now 2.10  - UA with trace blood, negative for rbcs, protein, + glucose  - No imaging available for review    Ned likely in setting of CHF exacerbation  Pt hypervolemic on exam  Continue diuretics/ permissive azotemia         68 year old female with PMH of morbidly obesity (500+lbs), DM, HTN, HLD, asthma, HFpEF, chronic hypoxic respiratory failure on 4L home oxygen, CKD, spinal stenosis (cervical spine + lumbar spine) and hx of DVT presents with worsening SOB. . Nephrology is consulted for worsening END on CKD.     Ned on CKD stage III  -Baseline creatinine is ~1.6- 2.0 mg/dL in Oct 2022 (as per [facility])  -On admission, creatinine was1.4  -SCr up trending since admission-now 2.10  - UA with trace blood, negative for rbcs, protein, + glucose  - No imaging available for review    Ned likely in setting of CHF exacerbation  Pt hypervolemic on exam  Continue diuretics/ permissive azotemia

## 2023-12-26 NOTE — PROGRESS NOTE ADULT - NUTRITIONAL ASSESSMENT
This patient has been assessed with a concern for Malnutrition and has been determined to have a diagnosis/diagnoses of Morbid obesity (BMI > 40).    This patient is being managed with:   Diet Consistent Carbohydrate w/Evening Snack-  1500mL Fluid Restriction ([id number])  Low Sodium  Entered: Dec 19 2023  6:23PM   This patient has been assessed with a concern for Malnutrition and has been determined to have a diagnosis/diagnoses of Morbid obesity (BMI > 40).    This patient is being managed with:   Diet Consistent Carbohydrate w/Evening Snack-  1500mL Fluid Restriction ([id number])  Low Sodium  Entered: Dec 19 2023  6:23PM

## 2023-12-26 NOTE — PROGRESS NOTE ADULT - SUBJECTIVE AND OBJECTIVE BOX
[facility]                                                         CARDIOLOGY AT [place] [address]                                                         Telephone: [phone]. Fax:[phone]                                                                             PROGRESS NOTE    Reason for follow up: new aflutter/HFpEF   Update:  patient is currently on HFNC, still getting diuresed with lasix 40 mg bid iv push. Patient is still AFib low 100s.        Review of symptoms:   Cardiac:  No chest pain. No dyspnea. No palpitations.  Respiratory: no cough. No dyspnea  Gastrointestinal: No diarrhea. No abdominal pain. No bleeding.   Neuro: No focal neuro complaints.    Vitals:  T(C): 37.1 (12-26-23 @ 07:53), Max: 37.2 (12-25-23 @ 18:00)  HR: 100 (12-26-23 @ 08:40) (97 - 120)  BP: 128/87 (12-26-23 @ 07:39) (116/67 - 150/60)  RR: 19 (12-26-23 @ 08:40) (18 - 24)  SpO2: 100% (12-26-23 @ 08:40) (92% - 100%)  Wt(kg): --  I&O's Summary    25 Dec 2023 07:01  -  26 Dec 2023 07:00  --------------------------------------------------------  IN: 1600 mL / OUT: 4675 mL / NET: -3075 mL    26 Dec 2023 07:01  -  26 Dec 2023 10:20  --------------------------------------------------------  IN: 14 mL / OUT: 350 mL / NET: -336 mL          PHYSICAL EXAM:  Appearance: Comfortable. No acute distress  HEENT:  Atraumatic. Normocephalic.  Normal oral mucosa  Neurologic: A & O x 3, no gross focal deficits.  Cardiovascular: Irregularly irregular. No murmur, no rubs/gallops. No JVD  Respiratory: B/L lungs are coarse, unlabored   Gastrointestinal:  Soft, Non-tender, + BS  Lower Extremities: 2+ Peripheral Pulses, No clubbing, cyanosis. B/L LE +3 edema  Psychiatry: Patient is calm. No agitation.   Skin: warm and dry.    CURRENT CARDIAC MEDICATIONS:   furosemide   Injectable 40 milliGRAM(s) IV Push daily  hydrALAZINE Injectable 10 milliGRAM(s) IV Push every 4 hours PRN  metoprolol tartrate 50 milliGRAM(s) Oral every 8 hours  metoprolol tartrate Injectable 5 milliGRAM(s) IV Push every 6 hours PRN      CURRENT OTHER MEDICATIONS:  albuterol    90 MICROgram(s) HFA Inhaler 2 Puff(s) Inhalation every 2 hours PRN Shortness of Breath and/or Wheezing  budesonide  80 MICROgram(s)/formoterol 4.5 MICROgram(s) Inhaler 2 Puff(s) Inhalation two times a day  ipratropium    for Nebulization 500 MICROGram(s) Nebulizer every 6 hours  levalbuterol Inhalation 0.63 milliGRAM(s) Inhalation every 6 hours  piperacillin/tazobactam IVPB.. 3.375 Gram(s) IV Intermittent every 8 hours  vancomycin  IVPB 1500 milliGRAM(s) IV Intermittent every 24 hours  acetaminophen     Tablet .. 650 milliGRAM(s) Oral every 6 hours PRN Temp greater or equal to 38C (100.4F), Mild Pain (1 - 3)  ALPRAZolam 0.25 milliGRAM(s) Oral every 8 hours PRN for anxiety/Sleep  diphenhydrAMINE 25 milliGRAM(s) Oral every 6 hours PRN Rash and/or Itching  HYDROmorphone  Injectable 0.5 milliGRAM(s) IV Push every 6 hours PRN Severe Pain (7 - 10)  HYDROmorphone  Injectable 1 milliGRAM(s) IV Push every 4 hours PRN breakthrough  pain  melatonin 3 milliGRAM(s) Oral at bedtime PRN Insomnia  ondansetron Injectable 4 milliGRAM(s) IV Push every 8 hours PRN Nausea and/or Vomiting  oxyCODONE    IR 5 milliGRAM(s) Oral every 6 hours PRN Moderate Pain (4 - 6)  oxyCODONE  ER Tablet 60 milliGRAM(s) Oral every 12 hours  famotidine    Tablet 20 milliGRAM(s) Oral daily  lactulose Syrup 20 Gram(s) Oral daily  polyethylene glycol 3350 17 Gram(s) Oral daily  senna 2 Tablet(s) Oral at bedtime  atorvastatin 80 milliGRAM(s) Oral at bedtime  dextrose 50% Injectable 25 Gram(s) IV Push once, Stop order after: 1 Doses  dextrose 50% Injectable 12.5 Gram(s) IV Push once, Stop order after: 1 Doses  dextrose 50% Injectable 25 Gram(s) IV Push once, Stop order after: 1 Doses  dextrose Oral Gel 15 Gram(s) Oral once, Stop order after: 1 Doses PRN Blood Glucose LESS THAN 70 milliGRAM(s)/deciliter  glucagon  Injectable 1 milliGRAM(s) IntraMuscular once, Stop order after: 1 Doses  insulin glargine Injectable (LANTUS) 50 Unit(s) SubCutaneous at bedtime  insulin glargine Injectable (LANTUS) 50 Unit(s) SubCutaneous every morning  insulin lispro (ADMELOG) corrective regimen sliding scale   SubCutaneous three times a day before meals  insulin lispro Injectable (ADMELOG) 35 Unit(s) SubCutaneous three times a day before meals  methylPREDNISolone sodium succinate Injectable 40 milliGRAM(s) IV Push every 24 hours  clotrimazole 1% Cream 1 Application(s) Topical two times a day  dextrose 5%. 1000 milliLiter(s) (50 mL/Hr) IV Continuous <Continuous>  dextrose 5%. 1000 milliLiter(s) (100 mL/Hr) IV Continuous <Continuous>  heparin   Injectable 5000 Unit(s) IV Push every 6 hours PRN For aPTT between 40 - 57  heparin   Injectable 80099 Unit(s) IV Push every 6 hours PRN For aPTT less than 40  heparin  Infusion.  Unit(s)/Hr (24 mL/Hr) IV Continuous <Continuous>  nystatin Powder 1 Application(s) Topical two times a day      LABS:	 	                            15.0   28.10 )-----------( 282      ( 26 Dec 2023 06:21 )             44.6     12-26    134<L>  |  88<L>  |  75.1<H>  ----------------------------<  364<H>  4.6   |  32.0<H>  |  2.10<H>    Ca    9.1      26 Dec 2023 06:21    TPro  6.5<L>  /  Alb  3.6  /  TBili  0.9  /  DBili  0.4<H>  /  AST  25  /  ALT  79<H>  /  AlkPhos  114  12-25    PT/INR/PTT ( 26 Dec 2023 09:31 )                       :                       :      X            :       53.0                  .        .                   .              .           .       X           .                                       Lipid Profile: Date: 12-17 @ 08:20  Total cholesterol 140; Direct LDL: --; HDL: 70; Triglycerides:133    HgA1c:   TSH: Thyroid Stimulating Hormone, Serum: 0.34 uIU/mL      TELEMETRY: Aflutter   ECG: Sinus tachycardia     DIAGNOSTIC TESTING:  [x ] Echocardiogram: < from: TTE W or WO Ultrasound Enhancing Agent (12.18.23 @ 13:36) >      1. Technically difficult image quality.   2. Normal biventricular systolic function.   3. Compared to the transthoracic echocardiogram performed on 8/31/2023.    < end of copied text >    [ ]  Catheterization:  [ ] Stress Test:    OTHER: 	                                                                [facility]                                                         CARDIOLOGY AT [place] [address]                                                         Telephone: [phone]. Fax:[phone]                                                                             PROGRESS NOTE    Reason for follow up: new aflutter/HFpEF   Update:  patient is currently on HFNC, still getting diuresed with lasix 40 mg bid iv push. Patient is still AFib low 100s.        Review of symptoms:   Cardiac:  No chest pain. No dyspnea. No palpitations.  Respiratory: no cough. No dyspnea  Gastrointestinal: No diarrhea. No abdominal pain. No bleeding.   Neuro: No focal neuro complaints.    Vitals:  T(C): 37.1 (12-26-23 @ 07:53), Max: 37.2 (12-25-23 @ 18:00)  HR: 100 (12-26-23 @ 08:40) (97 - 120)  BP: 128/87 (12-26-23 @ 07:39) (116/67 - 150/60)  RR: 19 (12-26-23 @ 08:40) (18 - 24)  SpO2: 100% (12-26-23 @ 08:40) (92% - 100%)  Wt(kg): --  I&O's Summary    25 Dec 2023 07:01  -  26 Dec 2023 07:00  --------------------------------------------------------  IN: 1600 mL / OUT: 4675 mL / NET: -3075 mL    26 Dec 2023 07:01  -  26 Dec 2023 10:20  --------------------------------------------------------  IN: 14 mL / OUT: 350 mL / NET: -336 mL          PHYSICAL EXAM:  Appearance: Comfortable. No acute distress  HEENT:  Atraumatic. Normocephalic.  Normal oral mucosa  Neurologic: A & O x 3, no gross focal deficits.  Cardiovascular: Irregularly irregular. No murmur, no rubs/gallops. No JVD  Respiratory: B/L lungs are coarse, unlabored   Gastrointestinal:  Soft, Non-tender, + BS  Lower Extremities: 2+ Peripheral Pulses, No clubbing, cyanosis. B/L LE +3 edema  Psychiatry: Patient is calm. No agitation.   Skin: warm and dry.    CURRENT CARDIAC MEDICATIONS:   furosemide   Injectable 40 milliGRAM(s) IV Push daily  hydrALAZINE Injectable 10 milliGRAM(s) IV Push every 4 hours PRN  metoprolol tartrate 50 milliGRAM(s) Oral every 8 hours  metoprolol tartrate Injectable 5 milliGRAM(s) IV Push every 6 hours PRN      CURRENT OTHER MEDICATIONS:  albuterol    90 MICROgram(s) HFA Inhaler 2 Puff(s) Inhalation every 2 hours PRN Shortness of Breath and/or Wheezing  budesonide  80 MICROgram(s)/formoterol 4.5 MICROgram(s) Inhaler 2 Puff(s) Inhalation two times a day  ipratropium    for Nebulization 500 MICROGram(s) Nebulizer every 6 hours  levalbuterol Inhalation 0.63 milliGRAM(s) Inhalation every 6 hours  piperacillin/tazobactam IVPB.. 3.375 Gram(s) IV Intermittent every 8 hours  vancomycin  IVPB 1500 milliGRAM(s) IV Intermittent every 24 hours  acetaminophen     Tablet .. 650 milliGRAM(s) Oral every 6 hours PRN Temp greater or equal to 38C (100.4F), Mild Pain (1 - 3)  ALPRAZolam 0.25 milliGRAM(s) Oral every 8 hours PRN for anxiety/Sleep  diphenhydrAMINE 25 milliGRAM(s) Oral every 6 hours PRN Rash and/or Itching  HYDROmorphone  Injectable 0.5 milliGRAM(s) IV Push every 6 hours PRN Severe Pain (7 - 10)  HYDROmorphone  Injectable 1 milliGRAM(s) IV Push every 4 hours PRN breakthrough  pain  melatonin 3 milliGRAM(s) Oral at bedtime PRN Insomnia  ondansetron Injectable 4 milliGRAM(s) IV Push every 8 hours PRN Nausea and/or Vomiting  oxyCODONE    IR 5 milliGRAM(s) Oral every 6 hours PRN Moderate Pain (4 - 6)  oxyCODONE  ER Tablet 60 milliGRAM(s) Oral every 12 hours  famotidine    Tablet 20 milliGRAM(s) Oral daily  lactulose Syrup 20 Gram(s) Oral daily  polyethylene glycol 3350 17 Gram(s) Oral daily  senna 2 Tablet(s) Oral at bedtime  atorvastatin 80 milliGRAM(s) Oral at bedtime  dextrose 50% Injectable 25 Gram(s) IV Push once, Stop order after: 1 Doses  dextrose 50% Injectable 12.5 Gram(s) IV Push once, Stop order after: 1 Doses  dextrose 50% Injectable 25 Gram(s) IV Push once, Stop order after: 1 Doses  dextrose Oral Gel 15 Gram(s) Oral once, Stop order after: 1 Doses PRN Blood Glucose LESS THAN 70 milliGRAM(s)/deciliter  glucagon  Injectable 1 milliGRAM(s) IntraMuscular once, Stop order after: 1 Doses  insulin glargine Injectable (LANTUS) 50 Unit(s) SubCutaneous at bedtime  insulin glargine Injectable (LANTUS) 50 Unit(s) SubCutaneous every morning  insulin lispro (ADMELOG) corrective regimen sliding scale   SubCutaneous three times a day before meals  insulin lispro Injectable (ADMELOG) 35 Unit(s) SubCutaneous three times a day before meals  methylPREDNISolone sodium succinate Injectable 40 milliGRAM(s) IV Push every 24 hours  clotrimazole 1% Cream 1 Application(s) Topical two times a day  dextrose 5%. 1000 milliLiter(s) (50 mL/Hr) IV Continuous <Continuous>  dextrose 5%. 1000 milliLiter(s) (100 mL/Hr) IV Continuous <Continuous>  heparin   Injectable 5000 Unit(s) IV Push every 6 hours PRN For aPTT between 40 - 57  heparin   Injectable 16273 Unit(s) IV Push every 6 hours PRN For aPTT less than 40  heparin  Infusion.  Unit(s)/Hr (24 mL/Hr) IV Continuous <Continuous>  nystatin Powder 1 Application(s) Topical two times a day      LABS:	 	                            15.0   28.10 )-----------( 282      ( 26 Dec 2023 06:21 )             44.6     12-26    134<L>  |  88<L>  |  75.1<H>  ----------------------------<  364<H>  4.6   |  32.0<H>  |  2.10<H>    Ca    9.1      26 Dec 2023 06:21    TPro  6.5<L>  /  Alb  3.6  /  TBili  0.9  /  DBili  0.4<H>  /  AST  25  /  ALT  79<H>  /  AlkPhos  114  12-25    PT/INR/PTT ( 26 Dec 2023 09:31 )                       :                       :      X            :       53.0                  .        .                   .              .           .       X           .                                       Lipid Profile: Date: 12-17 @ 08:20  Total cholesterol 140; Direct LDL: --; HDL: 70; Triglycerides:133    HgA1c:   TSH: Thyroid Stimulating Hormone, Serum: 0.34 uIU/mL      TELEMETRY: Aflutter   ECG: Sinus tachycardia     DIAGNOSTIC TESTING:  [x ] Echocardiogram: < from: TTE W or WO Ultrasound Enhancing Agent (12.18.23 @ 13:36) >      1. Technically difficult image quality.   2. Normal biventricular systolic function.   3. Compared to the transthoracic echocardiogram performed on 8/31/2023.    < end of copied text >    [ ]  Catheterization:  [ ] Stress Test:    OTHER:

## 2023-12-26 NOTE — PROGRESS NOTE ADULT - ASSESSMENT
67 yo female with HTN, HLD, DM2, HFpEF, CKD III, DVT, Uterine CA, COPD on home o2 who presented with complaints of shortness of breath. Admitted with acute on chronic resp failure and acute HFpEF exacerbation with likely with Superimposed bacterial Pneumonia in setting of RSV and new onset AFib and worsening GFR    1. T2DM complicated by CKD and with severe hyperglycemia in setting of high dose steroids - glucoses improving today  - cont Lantus 50 units BID  - cont prandial Admelog 35 units TID and high dose Admelog scale  - will follow, will need insulin dose adjustment with steroid taper    2. Acute on chronic respiratory failure and Acute HFpEF exacerbation with likely with Superimposed bacterial Pneumonia in setting of RSV  - IV Abx  - steroids per primary team, to start weaning this week  - weaned off HFNC  - off IV lasix due to rising Cr    3. HLD - cont statin 69 yo female with HTN, HLD, DM2, HFpEF, CKD III, DVT, Uterine CA, COPD on home o2 who presented with complaints of shortness of breath. Admitted with acute on chronic resp failure and acute HFpEF exacerbation with likely with Superimposed bacterial Pneumonia in setting of RSV and new onset AFib and worsening GFR    1. T2DM complicated by CKD and with severe hyperglycemia in setting of high dose steroids - glucoses improving today  - cont Lantus 50 units BID  - cont prandial Admelog 35 units TID and high dose Admelog scale  - will follow, will need insulin dose adjustment with steroid taper    2. Acute on chronic respiratory failure and Acute HFpEF exacerbation with likely with Superimposed bacterial Pneumonia in setting of RSV  - IV Abx  - steroids per primary team, to start weaning this week  - weaned off HFNC  - off IV lasix due to rising Cr    3. HLD - cont statin

## 2023-12-26 NOTE — PROGRESS NOTE ADULT - NS ATTEND AMEND GEN_ALL_CORE FT
Patient seen and examined by me. Seen in presence of: n/a. 67 y/o female PMH morbid obesity, HFpEF (EF 55-60% 8/2023), on home O2 4L, pulmonary HTN, was on Eliquis for presumed PE and severe pulmonary hypertension (PASP of 70 mmHg). super morbid obesity, asthma, CKD3, IDDM2, uterine cancer s/p MEGAN, DVT LLE (2004), chronic leg edema 2/2 venous stasis, spinal stenosis with chronic back pain who presents with midsternal chest pressure and shortness of breath. Patient converted to AFib on 12/21.  Patient is a retired nurse (L&D, [employer] for many years)    Acute on chronic HFpEF, NYHA III, ACC C  Bacterial pneumonia  Pulmonary HYN, Severe ( PASP 70 mmHg)  Super morbid obesity  CKD stage 3  insulin dependent diabetes mellitus  ?presumed PE  hx of spinal stenosis with chronic back pain  hx of LLE DVT 2004  chronic lower extremity edema  venous stasis dermatitis  asthma  hypoxemic respiratory failure        T(C): 37.1 (12-26-23 @ 07:53), Max: 37.2 (12-25-23 @ 18:00)  HR: 101 (12-26-23 @ 10:00) (98 - 120)  BP: 126/52 (12-26-23 @ 10:00) (116/67 - 150/60)  RR: 22 (12-26-23 @ 10:00) (18 - 24)  SpO2: 97% (12-26-23 @ 10:00) (92% - 100%)  Patient alert and awake.  Chest: decreased breath sounds in all lung fields.  Cardiac: S1 and S2  Abdomen: Soft  Lower extremity: 1+ pitting edema, venous stasis changes, chronic lower extremity wounds      Improvement in O2 requirements during my evaluation. She is on 5L NC at present. She has been initiated on GDMT but ace/arbs have been held because of JACQUES on CKD.     She still examines warm and hypervolemic, c/w IV Lasix 40mg Qday. C/w Lopressor 50mg Q8h    Recommend R + LHC when clinically able. Currently receiving antibiotics in form of zosyn and awaiting sputum culture. Not ready yet for invasive cardiac testing at this time.    Avoid digoxin at present. Recommend obtaining digoxin level with AM labs.    endocrinology for uncontrolled hyperglycemia. Recommend wean of steroids as able.      I counseled the patient on lifestyle modifications and cardiovascular health. She is motivated for change. We encouraged this during our extensive conversation.    Daytime ABG recommend once back at baseline o2 status.    Plan:  - Reevaluate 12/28 for invasive cardiac testing pending respiratory status and improvement in superimposed bacterial pneumonia    Patient was seen and evaluated. Available pertinent records, imaging reports and lab results were reviewed by the Heart team and mentioned as appropriate. Plan of care was discussed with the patient and any available family members (with patient consent), with questions answered and treatment plan agreement. We have informed the primary team of our treatment plan. The patient is aware of any side effects of new medications initiated, risks/benefits/alternatives of procedures/imaging planned.    I have discussed my recommendation with the ACP which are outlined above. Thank you for allowing me to participate in the care of this patient. Patient seen and examined by me. Seen in presence of: n/a. 69 y/o female PMH morbid obesity, HFpEF (EF 55-60% 8/2023), on home O2 4L, pulmonary HTN, was on Eliquis for presumed PE and severe pulmonary hypertension (PASP of 70 mmHg). super morbid obesity, asthma, CKD3, IDDM2, uterine cancer s/p MEGAN, DVT LLE (2004), chronic leg edema 2/2 venous stasis, spinal stenosis with chronic back pain who presents with midsternal chest pressure and shortness of breath. Patient converted to AFib on 12/21.  Patient is a retired nurse (L&D, [employer] for many years)    Acute on chronic HFpEF, NYHA III, ACC C  Bacterial pneumonia  Pulmonary HYN, Severe ( PASP 70 mmHg)  Super morbid obesity  CKD stage 3  insulin dependent diabetes mellitus  ?presumed PE  hx of spinal stenosis with chronic back pain  hx of LLE DVT 2004  chronic lower extremity edema  venous stasis dermatitis  asthma  hypoxemic respiratory failure        T(C): 37.1 (12-26-23 @ 07:53), Max: 37.2 (12-25-23 @ 18:00)  HR: 101 (12-26-23 @ 10:00) (98 - 120)  BP: 126/52 (12-26-23 @ 10:00) (116/67 - 150/60)  RR: 22 (12-26-23 @ 10:00) (18 - 24)  SpO2: 97% (12-26-23 @ 10:00) (92% - 100%)  Patient alert and awake.  Chest: decreased breath sounds in all lung fields.  Cardiac: S1 and S2  Abdomen: Soft  Lower extremity: 1+ pitting edema, venous stasis changes, chronic lower extremity wounds      Improvement in O2 requirements during my evaluation. She is on 5L NC at present. She has been initiated on GDMT but ace/arbs have been held because of JACQUES on CKD.     She still examines warm and hypervolemic, c/w IV Lasix 40mg Qday. C/w Lopressor 50mg Q8h    Recommend R + LHC when clinically able. Currently receiving antibiotics in form of zosyn and awaiting sputum culture. Not ready yet for invasive cardiac testing at this time.    Avoid digoxin at present. Recommend obtaining digoxin level with AM labs.    endocrinology for uncontrolled hyperglycemia. Recommend wean of steroids as able.      I counseled the patient on lifestyle modifications and cardiovascular health. She is motivated for change. We encouraged this during our extensive conversation.    Daytime ABG recommend once back at baseline o2 status.    Plan:  - Reevaluate 12/28 for invasive cardiac testing pending respiratory status and improvement in superimposed bacterial pneumonia    Patient was seen and evaluated. Available pertinent records, imaging reports and lab results were reviewed by the Heart team and mentioned as appropriate. Plan of care was discussed with the patient and any available family members (with patient consent), with questions answered and treatment plan agreement. We have informed the primary team of our treatment plan. The patient is aware of any side effects of new medications initiated, risks/benefits/alternatives of procedures/imaging planned.    I have discussed my recommendation with the ACP which are outlined above. Thank you for allowing me to participate in the care of this patient.

## 2023-12-26 NOTE — PROGRESS NOTE ADULT - SUBJECTIVE AND OBJECTIVE BOX
[facility] Division of Medicine    SUBJECTIVE / OVERNIGHT EVENTS: Pt seen at the bedside.  Patient denies chest pain, SOB, abd pain, N/V, fever, chills, dysuria or any other complaints. All remainder ROS negative.     MEDICATIONS  (STANDING):  atorvastatin 80 milliGRAM(s) Oral at bedtime  budesonide  80 MICROgram(s)/formoterol 4.5 MICROgram(s) Inhaler 2 Puff(s) Inhalation two times a day  clotrimazole 1% Cream 1 Application(s) Topical two times a day  dextrose 5%. 1000 milliLiter(s) (50 mL/Hr) IV Continuous <Continuous>  dextrose 5%. 1000 milliLiter(s) (100 mL/Hr) IV Continuous <Continuous>  dextrose 50% Injectable 25 Gram(s) IV Push once  dextrose 50% Injectable 12.5 Gram(s) IV Push once  dextrose 50% Injectable 25 Gram(s) IV Push once  diltiazem    Tablet 30 milliGRAM(s) Oral once  famotidine    Tablet 20 milliGRAM(s) Oral daily  furosemide   Injectable 40 milliGRAM(s) IV Push daily  glucagon  Injectable 1 milliGRAM(s) IntraMuscular once  heparin  Infusion.  Unit(s)/Hr (24 mL/Hr) IV Continuous <Continuous>  insulin glargine Injectable (LANTUS) 50 Unit(s) SubCutaneous every morning  insulin glargine Injectable (LANTUS) 50 Unit(s) SubCutaneous at bedtime  insulin lispro (ADMELOG) corrective regimen sliding scale   SubCutaneous three times a day before meals  insulin lispro Injectable (ADMELOG) 35 Unit(s) SubCutaneous three times a day before meals  ipratropium    for Nebulization 500 MICROGram(s) Nebulizer every 6 hours  lactulose Syrup 20 Gram(s) Oral daily  levalbuterol Inhalation 0.63 milliGRAM(s) Inhalation every 6 hours  meropenem Injectable 1000 milliGRAM(s) IV Push every 8 hours  methylPREDNISolone sodium succinate Injectable 40 milliGRAM(s) IV Push every 24 hours  metoprolol tartrate 50 milliGRAM(s) Oral every 8 hours  nystatin Powder 1 Application(s) Topical two times a day  oxyCODONE  ER Tablet 60 milliGRAM(s) Oral every 12 hours  polyethylene glycol 3350 17 Gram(s) Oral daily  senna 2 Tablet(s) Oral at bedtime  vancomycin  IVPB 1500 milliGRAM(s) IV Intermittent every 24 hours    MEDICATIONS  (PRN):  acetaminophen     Tablet .. 650 milliGRAM(s) Oral every 6 hours PRN Temp greater or equal to 38C (100.4F), Mild Pain (1 - 3)  albuterol    90 MICROgram(s) HFA Inhaler 2 Puff(s) Inhalation every 2 hours PRN Shortness of Breath and/or Wheezing  ALPRAZolam 0.25 milliGRAM(s) Oral every 8 hours PRN for anxiety/Sleep  dextrose Oral Gel 15 Gram(s) Oral once PRN Blood Glucose LESS THAN 70 milliGRAM(s)/deciliter  diphenhydrAMINE 25 milliGRAM(s) Oral every 6 hours PRN Rash and/or Itching  heparin   Injectable 5000 Unit(s) IV Push every 6 hours PRN For aPTT between 40 - 57  heparin   Injectable 51346 Unit(s) IV Push every 6 hours PRN For aPTT less than 40  hydrALAZINE Injectable 10 milliGRAM(s) IV Push every 4 hours PRN for systolic bp > 160  HYDROmorphone  Injectable 0.5 milliGRAM(s) IV Push every 6 hours PRN Severe Pain (7 - 10)  HYDROmorphone  Injectable 1 milliGRAM(s) IV Push every 4 hours PRN breakthrough  pain  melatonin 3 milliGRAM(s) Oral at bedtime PRN Insomnia  metoprolol tartrate Injectable 5 milliGRAM(s) IV Push every 6 hours PRN heart rate sustaining greater than 130  ondansetron Injectable 4 milliGRAM(s) IV Push every 8 hours PRN Nausea and/or Vomiting  oxyCODONE    IR 5 milliGRAM(s) Oral every 6 hours PRN Moderate Pain (4 - 6)      I&O's Summary    25 Dec 2023 07:01  -  26 Dec 2023 07:00  --------------------------------------------------------  IN: 1600 mL / OUT: 4675 mL / NET: -3075 mL    26 Dec 2023 07:  -  26 Dec 2023 14:03  --------------------------------------------------------  IN: 49 mL / OUT: 1250 mL / NET: -1201 mL        PHYSICAL EXAM:  Vital Signs Last 24 Hrs  T(C): 37.1 (26 Dec 2023 07:53), Max: 37.2 (25 Dec 2023 18:00)  T(F): 98.8 (26 Dec 2023 07:53), Max: 99 (25 Dec 2023 18:00)  HR: 104 (26 Dec 2023 12:00) (98 - 120)  BP: 122/56 (26 Dec 2023 12:00) (116/67 - 150/60)  BP(mean): 67 (26 Dec 2023 12:00) (61 - 96)  RR: 21 (26 Dec 2023 12:00) (18 - 24)  SpO2: 98% (26 Dec 2023 12:00) (92% - 100%)    Parameters below as of 26 Dec 2023 12:00  Patient On (Oxygen Delivery Method): nasal cannula  O2 Flow (L/min): 5      GENERAL: not in acute distress, morbidly obese   HEENT:  Clear conjunctiva, PERRL,  EOMI, moist oral mucosa no pharyngeal injection or exudates, no cervical LAD  RESP:  Non-labored breathing pattern, course lungs sounds on the right , no wheezes or crackles appreciated  CV: Regular rate and rhythm, no murmurs appreciated, +1 lower extremity edema, peripheral pulses are 2+ bilaterally  GI: Soft, non-tender, non-distended  NEURO: Awake, alert, conversant, upper and lower extremity strength and light touch sensation grossly intact  PSYCH: Calm, cooperative, A&Ox3  SKIN: No rash or lesions, warm and         LABS:                        15.0   28.10 )-----------( 282      ( 26 Dec 2023 06:21 )             44.6     12-    134<L>  |  88<L>  |  75.1<H>  ----------------------------<  364<H>  4.6   |  32.0<H>  |  2.10<H>    Ca    9.1      26 Dec 2023 06:21    TPro  6.5<L>  /  Alb  3.6  /  TBili  0.9  /  DBili  0.4<H>  /  AST  25  /  ALT  79<H>  /  AlkPhos  114  12-25    PTT - ( 26 Dec 2023 09:31 )  PTT:53.0 sec      Urinalysis Basic - ( 26 Dec 2023 09:38 )    Color: Yellow / Appearance: Clear / S.014 / pH: x  Gluc: x / Ketone: Negative mg/dL  / Bili: Negative / Urobili: 1.0 mg/dL   Blood: x / Protein: Trace mg/dL / Nitrite: Negative   Leuk Esterase: Negative / RBC: 1 /HPF / WBC 0 /HPF   Sq Epi: x / Non Sq Epi: 1 /HPF / Bacteria: Negative /HPF        CAPILLARY BLOOD GLUCOSE      POCT Blood Glucose.: 294 mg/dL (26 Dec 2023 12:43)  POCT Blood Glucose.: 315 mg/dL (26 Dec 2023 08:00)  POCT Blood Glucose.: 351 mg/dL (26 Dec 2023 05:47)  POCT Blood Glucose.: 306 mg/dL (25 Dec 2023 22:29)  POCT Blood Glucose.: 325 mg/dL (25 Dec 2023 18:10)      IMAGING:      < from: CT Chest No Cont (12.26.23 @ 10:07) >  PROCEDURE DATE:  2023          INTERPRETATION:  HISTORY: Acute hypoxic respiratory failure. Worsening   WBC. Worsening pneumonia.    EXAMINATION: CT CHEST was performed without IV contrast.    COMPARISON: 2023 CT chest.    FINDINGS:    AIRWAYS, LUNGS, PLEURA: Interval worsening of right middle lobe   parenchymal consolidation with volume loss. Mild atelectasis in the   lingula and left base.    Trachea and mainstem bronchipatent. No pleural effusion.    MEDIASTINUM: Normal heart size. No pericardial effusion. Thoracic aorta   normal caliber.  No large mediastinal lymph nodes.    IMAGED ABDOMEN: Cholecystectomy. Right adrenal nodule as on 2020 CT   chest.    SOFT TISSUES: Unremarkable.    BONES: Unremarkable.      IMPRESSION:.    Interval worsening of right middle lobe consolidation, likely pneumonia.    < end of copied text >                               [facility] Division of Medicine    SUBJECTIVE / OVERNIGHT EVENTS: Pt seen at the bedside.  Patient denies chest pain, SOB, abd pain, N/V, fever, chills, dysuria or any other complaints. All remainder ROS negative.     MEDICATIONS  (STANDING):  atorvastatin 80 milliGRAM(s) Oral at bedtime  budesonide  80 MICROgram(s)/formoterol 4.5 MICROgram(s) Inhaler 2 Puff(s) Inhalation two times a day  clotrimazole 1% Cream 1 Application(s) Topical two times a day  dextrose 5%. 1000 milliLiter(s) (50 mL/Hr) IV Continuous <Continuous>  dextrose 5%. 1000 milliLiter(s) (100 mL/Hr) IV Continuous <Continuous>  dextrose 50% Injectable 25 Gram(s) IV Push once  dextrose 50% Injectable 12.5 Gram(s) IV Push once  dextrose 50% Injectable 25 Gram(s) IV Push once  diltiazem    Tablet 30 milliGRAM(s) Oral once  famotidine    Tablet 20 milliGRAM(s) Oral daily  furosemide   Injectable 40 milliGRAM(s) IV Push daily  glucagon  Injectable 1 milliGRAM(s) IntraMuscular once  heparin  Infusion.  Unit(s)/Hr (24 mL/Hr) IV Continuous <Continuous>  insulin glargine Injectable (LANTUS) 50 Unit(s) SubCutaneous every morning  insulin glargine Injectable (LANTUS) 50 Unit(s) SubCutaneous at bedtime  insulin lispro (ADMELOG) corrective regimen sliding scale   SubCutaneous three times a day before meals  insulin lispro Injectable (ADMELOG) 35 Unit(s) SubCutaneous three times a day before meals  ipratropium    for Nebulization 500 MICROGram(s) Nebulizer every 6 hours  lactulose Syrup 20 Gram(s) Oral daily  levalbuterol Inhalation 0.63 milliGRAM(s) Inhalation every 6 hours  meropenem Injectable 1000 milliGRAM(s) IV Push every 8 hours  methylPREDNISolone sodium succinate Injectable 40 milliGRAM(s) IV Push every 24 hours  metoprolol tartrate 50 milliGRAM(s) Oral every 8 hours  nystatin Powder 1 Application(s) Topical two times a day  oxyCODONE  ER Tablet 60 milliGRAM(s) Oral every 12 hours  polyethylene glycol 3350 17 Gram(s) Oral daily  senna 2 Tablet(s) Oral at bedtime  vancomycin  IVPB 1500 milliGRAM(s) IV Intermittent every 24 hours    MEDICATIONS  (PRN):  acetaminophen     Tablet .. 650 milliGRAM(s) Oral every 6 hours PRN Temp greater or equal to 38C (100.4F), Mild Pain (1 - 3)  albuterol    90 MICROgram(s) HFA Inhaler 2 Puff(s) Inhalation every 2 hours PRN Shortness of Breath and/or Wheezing  ALPRAZolam 0.25 milliGRAM(s) Oral every 8 hours PRN for anxiety/Sleep  dextrose Oral Gel 15 Gram(s) Oral once PRN Blood Glucose LESS THAN 70 milliGRAM(s)/deciliter  diphenhydrAMINE 25 milliGRAM(s) Oral every 6 hours PRN Rash and/or Itching  heparin   Injectable 5000 Unit(s) IV Push every 6 hours PRN For aPTT between 40 - 57  heparin   Injectable 31108 Unit(s) IV Push every 6 hours PRN For aPTT less than 40  hydrALAZINE Injectable 10 milliGRAM(s) IV Push every 4 hours PRN for systolic bp > 160  HYDROmorphone  Injectable 0.5 milliGRAM(s) IV Push every 6 hours PRN Severe Pain (7 - 10)  HYDROmorphone  Injectable 1 milliGRAM(s) IV Push every 4 hours PRN breakthrough  pain  melatonin 3 milliGRAM(s) Oral at bedtime PRN Insomnia  metoprolol tartrate Injectable 5 milliGRAM(s) IV Push every 6 hours PRN heart rate sustaining greater than 130  ondansetron Injectable 4 milliGRAM(s) IV Push every 8 hours PRN Nausea and/or Vomiting  oxyCODONE    IR 5 milliGRAM(s) Oral every 6 hours PRN Moderate Pain (4 - 6)      I&O's Summary    25 Dec 2023 07:01  -  26 Dec 2023 07:00  --------------------------------------------------------  IN: 1600 mL / OUT: 4675 mL / NET: -3075 mL    26 Dec 2023 07:  -  26 Dec 2023 14:03  --------------------------------------------------------  IN: 49 mL / OUT: 1250 mL / NET: -1201 mL        PHYSICAL EXAM:  Vital Signs Last 24 Hrs  T(C): 37.1 (26 Dec 2023 07:53), Max: 37.2 (25 Dec 2023 18:00)  T(F): 98.8 (26 Dec 2023 07:53), Max: 99 (25 Dec 2023 18:00)  HR: 104 (26 Dec 2023 12:00) (98 - 120)  BP: 122/56 (26 Dec 2023 12:00) (116/67 - 150/60)  BP(mean): 67 (26 Dec 2023 12:00) (61 - 96)  RR: 21 (26 Dec 2023 12:00) (18 - 24)  SpO2: 98% (26 Dec 2023 12:00) (92% - 100%)    Parameters below as of 26 Dec 2023 12:00  Patient On (Oxygen Delivery Method): nasal cannula  O2 Flow (L/min): 5      GENERAL: not in acute distress, morbidly obese   HEENT:  Clear conjunctiva, PERRL,  EOMI, moist oral mucosa no pharyngeal injection or exudates, no cervical LAD  RESP:  Non-labored breathing pattern, course lungs sounds on the right , no wheezes or crackles appreciated  CV: Regular rate and rhythm, no murmurs appreciated, +1 lower extremity edema, peripheral pulses are 2+ bilaterally  GI: Soft, non-tender, non-distended  NEURO: Awake, alert, conversant, upper and lower extremity strength and light touch sensation grossly intact  PSYCH: Calm, cooperative, A&Ox3  SKIN: No rash or lesions, warm and         LABS:                        15.0   28.10 )-----------( 282      ( 26 Dec 2023 06:21 )             44.6     12-    134<L>  |  88<L>  |  75.1<H>  ----------------------------<  364<H>  4.6   |  32.0<H>  |  2.10<H>    Ca    9.1      26 Dec 2023 06:21    TPro  6.5<L>  /  Alb  3.6  /  TBili  0.9  /  DBili  0.4<H>  /  AST  25  /  ALT  79<H>  /  AlkPhos  114  12-25    PTT - ( 26 Dec 2023 09:31 )  PTT:53.0 sec      Urinalysis Basic - ( 26 Dec 2023 09:38 )    Color: Yellow / Appearance: Clear / S.014 / pH: x  Gluc: x / Ketone: Negative mg/dL  / Bili: Negative / Urobili: 1.0 mg/dL   Blood: x / Protein: Trace mg/dL / Nitrite: Negative   Leuk Esterase: Negative / RBC: 1 /HPF / WBC 0 /HPF   Sq Epi: x / Non Sq Epi: 1 /HPF / Bacteria: Negative /HPF        CAPILLARY BLOOD GLUCOSE      POCT Blood Glucose.: 294 mg/dL (26 Dec 2023 12:43)  POCT Blood Glucose.: 315 mg/dL (26 Dec 2023 08:00)  POCT Blood Glucose.: 351 mg/dL (26 Dec 2023 05:47)  POCT Blood Glucose.: 306 mg/dL (25 Dec 2023 22:29)  POCT Blood Glucose.: 325 mg/dL (25 Dec 2023 18:10)      IMAGING:      < from: CT Chest No Cont (12.26.23 @ 10:07) >  PROCEDURE DATE:  2023          INTERPRETATION:  HISTORY: Acute hypoxic respiratory failure. Worsening   WBC. Worsening pneumonia.    EXAMINATION: CT CHEST was performed without IV contrast.    COMPARISON: 2023 CT chest.    FINDINGS:    AIRWAYS, LUNGS, PLEURA: Interval worsening of right middle lobe   parenchymal consolidation with volume loss. Mild atelectasis in the   lingula and left base.    Trachea and mainstem bronchipatent. No pleural effusion.    MEDIASTINUM: Normal heart size. No pericardial effusion. Thoracic aorta   normal caliber.  No large mediastinal lymph nodes.    IMAGED ABDOMEN: Cholecystectomy. Right adrenal nodule as on 2020 CT   chest.    SOFT TISSUES: Unremarkable.    BONES: Unremarkable.      IMPRESSION:.    Interval worsening of right middle lobe consolidation, likely pneumonia.    < end of copied text >

## 2023-12-26 NOTE — PROGRESS NOTE ADULT - ASSESSMENT
67 y/o female PMH morbid obesity, HFpEF (EF 55-60% 8/2023), on home O2 4L, pulmonary HTN, was on Eliquis for presumed PE and severe pulmonary hypertension (PASP of 70 mmHg). super morbid obesity, asthma, CKD3, IDDM2, uterine cancer s/p MEGAN, DVT LLE (2004), chronic leg edema 2/2 venous stasis, spinal stenosis with chronic back pain who presents with midsternal chest pressure and shortness of breath. Patient converted to AFib on 12/21.    69 y/o female PMH morbid obesity, HFpEF (EF 55-60% 8/2023), on home O2 4L, pulmonary HTN, was on Eliquis for presumed PE and severe pulmonary hypertension (PASP of 70 mmHg). super morbid obesity, asthma, CKD3, IDDM2, uterine cancer s/p MEGAN, DVT LLE (2004), chronic leg edema 2/2 venous stasis, spinal stenosis with chronic back pain who presents with midsternal chest pressure and shortness of breath. Patient converted to AFib on 12/21.

## 2023-12-26 NOTE — PROGRESS NOTE ADULT - PROBLEM SELECTOR PLAN 2
- New onset  - rates better controlled in the low 100s. Goal resting HR is low 110s.   - c/w metoprolol 50 mg po q8h   - s/p digoxin 500 mcg IV push x1 on 12/22/23   - [id number]   - c/w heparin drip full anticoagulation nomogram for anticoagulation - New onset  - rates better controlled in the low 100s. Goal resting HR is low 110s.   - c/w metoprolol 50 mg po q8h   - s/p digoxin 500 mcg IV push x1 on 12/22/23   - [id number]   - c/w heparin drip full anticoagulation nomogram for anticoagulation

## 2023-12-26 NOTE — CONSULT NOTE ADULT - SUBJECTIVE AND OBJECTIVE BOX
[facility] DIVISION OF KIDNEY DISEASES AND HYPERTENSION -- INITIAL CONSULT NOTE  --------------------------------------------------------------------------------  HPI:        PAST HISTORY  --------------------------------------------------------------------------------  PAST MEDICAL & SURGICAL HISTORY:  Diabetes Mellitus Type II      HTN (Hypertension)      Endometrial Hyperplasia      Cervical Stenosis of Spine      Spinal Stenosis, Lumbar      Deep Vein Thrombosis (DVT)  Left leg, 2004, treated and resolved      Dyslipidemia      Cataract      Morbid Obesity      Vitamin D deficiency      Insomnia      CKD (chronic kidney disease)  ~ III      Congestive heart failure  ~ HFpEF      Uterine cancer      Asthma      On home oxygen therapy      Gait difficulty  ~ u ses walker      Cataract extracted with lens implant 1998  Right      C Section 1994      Cholecystectomy/appendectomy @ age 25      D&C x2 1980's      D&C 2008  hysteroscopy, endometrial hyperplasia, 2009      Cervical Spinal Stenosis surgery x2 (01/2002, 7/2002)      Tonsillectomy as a child      Endometrial biopsy 12/02/09      H/O laser iridotomy  left eye, 2016      H/O colonoscopy  1998      S/P total abdominal hysterectomy and bilateral salpingo-oophorectomy      S/P appendectomy      S/P cholecystectomy        FAMILY HISTORY:  Family history of pancreatic cancer    Family history of bladder cancer    Family history of lung cancer (Grandparent)      PAST SOCIAL HISTORY:    ALLERGIES & MEDICATIONS  --------------------------------------------------------------------------------  Allergies    wool- rash, itch (Other)  adhesives (Rash)  latex (Rash)  Bactrim (Flushing)    Intolerances      Standing Inpatient Medications  atorvastatin 80 milliGRAM(s) Oral at bedtime  budesonide  80 MICROgram(s)/formoterol 4.5 MICROgram(s) Inhaler 2 Puff(s) Inhalation two times a day  clotrimazole 1% Cream 1 Application(s) Topical two times a day  dextrose 5%. 1000 milliLiter(s) IV Continuous <Continuous>  dextrose 5%. 1000 milliLiter(s) IV Continuous <Continuous>  dextrose 50% Injectable 25 Gram(s) IV Push once  dextrose 50% Injectable 12.5 Gram(s) IV Push once  dextrose 50% Injectable 25 Gram(s) IV Push once  famotidine    Tablet 20 milliGRAM(s) Oral daily  glucagon  Injectable 1 milliGRAM(s) IntraMuscular once  heparin  Infusion.  Unit(s)/Hr IV Continuous <Continuous>  insulin glargine Injectable (LANTUS) 50 Unit(s) SubCutaneous at bedtime  insulin glargine Injectable (LANTUS) 50 Unit(s) SubCutaneous every morning  insulin lispro (ADMELOG) corrective regimen sliding scale   SubCutaneous three times a day before meals  insulin lispro Injectable (ADMELOG) 35 Unit(s) SubCutaneous three times a day before meals  ipratropium    for Nebulization 500 MICROGram(s) Nebulizer every 6 hours  lactulose Syrup 20 Gram(s) Oral daily  levalbuterol Inhalation 0.63 milliGRAM(s) Inhalation every 6 hours  meropenem Injectable 1000 milliGRAM(s) IV Push every 8 hours  methylPREDNISolone sodium succinate Injectable 40 milliGRAM(s) IV Push every 24 hours  metoprolol tartrate 50 milliGRAM(s) Oral every 8 hours  nystatin Powder 1 Application(s) Topical two times a day  oxyCODONE  ER Tablet 60 milliGRAM(s) Oral every 12 hours  polyethylene glycol 3350 17 Gram(s) Oral daily  senna 2 Tablet(s) Oral at bedtime  vancomycin  IVPB 1500 milliGRAM(s) IV Intermittent every 24 hours    PRN Inpatient Medications  acetaminophen     Tablet .. 650 milliGRAM(s) Oral every 6 hours PRN  albuterol    90 MICROgram(s) HFA Inhaler 2 Puff(s) Inhalation every 2 hours PRN  ALPRAZolam 0.25 milliGRAM(s) Oral every 8 hours PRN  dextrose Oral Gel 15 Gram(s) Oral once PRN  diphenhydrAMINE 25 milliGRAM(s) Oral every 6 hours PRN  heparin   Injectable 5000 Unit(s) IV Push every 6 hours PRN  heparin   Injectable 73084 Unit(s) IV Push every 6 hours PRN  hydrALAZINE Injectable 10 milliGRAM(s) IV Push every 4 hours PRN  HYDROmorphone  Injectable 0.5 milliGRAM(s) IV Push every 6 hours PRN  HYDROmorphone  Injectable 1 milliGRAM(s) IV Push every 4 hours PRN  melatonin 3 milliGRAM(s) Oral at bedtime PRN  metoprolol tartrate Injectable 5 milliGRAM(s) IV Push every 6 hours PRN  ondansetron Injectable 4 milliGRAM(s) IV Push every 8 hours PRN  oxyCODONE    IR 5 milliGRAM(s) Oral every 6 hours PRN      REVIEW OF SYSTEMS  --------------------------------------------------------------------------------  Gen: No weight changes, fatigue, fevers/chills, weakness  Skin: No rashes  Head/Eyes/Ears/Mouth: No headache; Normal hearing; Normal vision w/o blurriness; No sinus pain/discomfort, sore throat  Respiratory: No dyspnea, cough, wheezing, hemoptysis  CV: No chest pain, PND, orthopnea  GI: No abdominal pain, diarrhea, constipation, nausea, vomiting, melena, hematochezia  : No increased frequency, dysuria, hematuria, nocturia  MSK: No joint pain/swelling; no back pain; no edema  Neuro: No dizziness/lightheadedness, weakness, seizures, numbness, tingling  Heme: No easy bruising or bleeding  Endo: No heat/cold intolerance  Psych: No significant nervousness, anxiety, stress, depression    All other systems were reviewed and are negative, except as noted.    VITALS/PHYSICAL EXAM  --------------------------------------------------------------------------------  T(C): 37.3 (12-26-23 @ 16:00), Max: 37.3 (12-26-23 @ 16:00)  HR: 99 (12-26-23 @ 14:00) (99 - 120)  BP: 140/44 (12-26-23 @ 14:00) (116/67 - 150/60)  RR: 19 (12-26-23 @ 14:00) (18 - 24)  SpO2: 99% (12-26-23 @ 14:00) (92% - 100%)  Wt(kg): --        12-25-23 @ 07:01  -  12-26-23 @ 07:00  --------------------------------------------------------  IN: 1600 mL / OUT: 4675 mL / NET: -3075 mL    12-26-23 @ 07:01  -  12-26-23 @ 16:36  --------------------------------------------------------  IN: 84 mL / OUT: 2300 mL / NET: -2216 mL      Physical Exam:  	Gen: NAD, well-appearing  	HEENT: PERRL, supple neck, clear oropharynx  	Pulm: CTA B/L  	CV: RRR, S1S2; no rub  	Back: No spinal or CVA tenderness; no sacral edema  	Abd: +BS, soft, nontender/nondistended  	: No suprapubic tenderness  	UE: Warm, FROM, no clubbing, intact strength; no edema; no asterixis  	LE: Warm, FROM, no clubbing, intact strength; no edema  	Neuro: No focal deficits, intact gait  	Psych: Normal affect and mood  	Skin: Warm, without rashes  	Vascular access:    LABS/STUDIES  --------------------------------------------------------------------------------              15.0   28.10 >-----------<  282      [12-26-23 @ 06:21]              44.6     134  |  88  |  75.1  ----------------------------<  364      [12-26-23 @ 06:21]  4.6   |  32.0  |  2.10        Ca     9.1     [12-26-23 @ 06:21]    TPro  6.5  /  Alb  3.6  /  TBili  0.9  /  DBili  0.4  /  AST  25  /  ALT  79  /  AlkPhos  114  [12-25-23 @ 09:20]      PTT: 53.0       [12-26-23 @ 09:31]      Creatinine Trend:  SCr 2.10 [12-26 @ 06:21]  SCr 1.85 [12-25 @ 09:20]  SCr 1.74 [12-24 @ 08:41]  SCr 1.65 [12-23 @ 04:35]  SCr 1.65 [12-22 @ 04:51]    Urinalysis - [12-26-23 @ 09:38]      Color Yellow / Appearance Clear / SG 1.014 / pH 6.0      Gluc 250 / Ketone Negative  / Bili Negative / Urobili 1.0       Blood Non Hemolyzed Trace / Protein Trace / Leuk Est Negative / Nitrite Negative      RBC 1 / WBC 0 / Hyaline  / Gran  / Sq Epi  / Non Sq Epi 1 / Bacteria Negative      TSH 0.34      [12-22-23 @ 04:51]  Lipid: chol 140, , HDL 70, LDL --      [12-17-23 @ 08:20]    HCV 0.12, Nonreact      [07-21-20 @ 09:27]     [facility] DIVISION OF KIDNEY DISEASES AND HYPERTENSION -- INITIAL CONSULT NOTE  --------------------------------------------------------------------------------  HPI:        PAST HISTORY  --------------------------------------------------------------------------------  PAST MEDICAL & SURGICAL HISTORY:  Diabetes Mellitus Type II      HTN (Hypertension)      Endometrial Hyperplasia      Cervical Stenosis of Spine      Spinal Stenosis, Lumbar      Deep Vein Thrombosis (DVT)  Left leg, 2004, treated and resolved      Dyslipidemia      Cataract      Morbid Obesity      Vitamin D deficiency      Insomnia      CKD (chronic kidney disease)  ~ III      Congestive heart failure  ~ HFpEF      Uterine cancer      Asthma      On home oxygen therapy      Gait difficulty  ~ u ses walker      Cataract extracted with lens implant 1998  Right      C Section 1994      Cholecystectomy/appendectomy @ age 25      D&C x2 1980's      D&C 2008  hysteroscopy, endometrial hyperplasia, 2009      Cervical Spinal Stenosis surgery x2 (01/2002, 7/2002)      Tonsillectomy as a child      Endometrial biopsy 12/02/09      H/O laser iridotomy  left eye, 2016      H/O colonoscopy  1998      S/P total abdominal hysterectomy and bilateral salpingo-oophorectomy      S/P appendectomy      S/P cholecystectomy        FAMILY HISTORY:  Family history of pancreatic cancer    Family history of bladder cancer    Family history of lung cancer (Grandparent)      PAST SOCIAL HISTORY:    ALLERGIES & MEDICATIONS  --------------------------------------------------------------------------------  Allergies    wool- rash, itch (Other)  adhesives (Rash)  latex (Rash)  Bactrim (Flushing)    Intolerances      Standing Inpatient Medications  atorvastatin 80 milliGRAM(s) Oral at bedtime  budesonide  80 MICROgram(s)/formoterol 4.5 MICROgram(s) Inhaler 2 Puff(s) Inhalation two times a day  clotrimazole 1% Cream 1 Application(s) Topical two times a day  dextrose 5%. 1000 milliLiter(s) IV Continuous <Continuous>  dextrose 5%. 1000 milliLiter(s) IV Continuous <Continuous>  dextrose 50% Injectable 25 Gram(s) IV Push once  dextrose 50% Injectable 12.5 Gram(s) IV Push once  dextrose 50% Injectable 25 Gram(s) IV Push once  famotidine    Tablet 20 milliGRAM(s) Oral daily  glucagon  Injectable 1 milliGRAM(s) IntraMuscular once  heparin  Infusion.  Unit(s)/Hr IV Continuous <Continuous>  insulin glargine Injectable (LANTUS) 50 Unit(s) SubCutaneous at bedtime  insulin glargine Injectable (LANTUS) 50 Unit(s) SubCutaneous every morning  insulin lispro (ADMELOG) corrective regimen sliding scale   SubCutaneous three times a day before meals  insulin lispro Injectable (ADMELOG) 35 Unit(s) SubCutaneous three times a day before meals  ipratropium    for Nebulization 500 MICROGram(s) Nebulizer every 6 hours  lactulose Syrup 20 Gram(s) Oral daily  levalbuterol Inhalation 0.63 milliGRAM(s) Inhalation every 6 hours  meropenem Injectable 1000 milliGRAM(s) IV Push every 8 hours  methylPREDNISolone sodium succinate Injectable 40 milliGRAM(s) IV Push every 24 hours  metoprolol tartrate 50 milliGRAM(s) Oral every 8 hours  nystatin Powder 1 Application(s) Topical two times a day  oxyCODONE  ER Tablet 60 milliGRAM(s) Oral every 12 hours  polyethylene glycol 3350 17 Gram(s) Oral daily  senna 2 Tablet(s) Oral at bedtime  vancomycin  IVPB 1500 milliGRAM(s) IV Intermittent every 24 hours    PRN Inpatient Medications  acetaminophen     Tablet .. 650 milliGRAM(s) Oral every 6 hours PRN  albuterol    90 MICROgram(s) HFA Inhaler 2 Puff(s) Inhalation every 2 hours PRN  ALPRAZolam 0.25 milliGRAM(s) Oral every 8 hours PRN  dextrose Oral Gel 15 Gram(s) Oral once PRN  diphenhydrAMINE 25 milliGRAM(s) Oral every 6 hours PRN  heparin   Injectable 5000 Unit(s) IV Push every 6 hours PRN  heparin   Injectable 03422 Unit(s) IV Push every 6 hours PRN  hydrALAZINE Injectable 10 milliGRAM(s) IV Push every 4 hours PRN  HYDROmorphone  Injectable 0.5 milliGRAM(s) IV Push every 6 hours PRN  HYDROmorphone  Injectable 1 milliGRAM(s) IV Push every 4 hours PRN  melatonin 3 milliGRAM(s) Oral at bedtime PRN  metoprolol tartrate Injectable 5 milliGRAM(s) IV Push every 6 hours PRN  ondansetron Injectable 4 milliGRAM(s) IV Push every 8 hours PRN  oxyCODONE    IR 5 milliGRAM(s) Oral every 6 hours PRN      REVIEW OF SYSTEMS  --------------------------------------------------------------------------------  Gen: No weight changes, fatigue, fevers/chills, weakness  Skin: No rashes  Head/Eyes/Ears/Mouth: No headache; Normal hearing; Normal vision w/o blurriness; No sinus pain/discomfort, sore throat  Respiratory: No dyspnea, cough, wheezing, hemoptysis  CV: No chest pain, PND, orthopnea  GI: No abdominal pain, diarrhea, constipation, nausea, vomiting, melena, hematochezia  : No increased frequency, dysuria, hematuria, nocturia  MSK: No joint pain/swelling; no back pain; no edema  Neuro: No dizziness/lightheadedness, weakness, seizures, numbness, tingling  Heme: No easy bruising or bleeding  Endo: No heat/cold intolerance  Psych: No significant nervousness, anxiety, stress, depression    All other systems were reviewed and are negative, except as noted.    VITALS/PHYSICAL EXAM  --------------------------------------------------------------------------------  T(C): 37.3 (12-26-23 @ 16:00), Max: 37.3 (12-26-23 @ 16:00)  HR: 99 (12-26-23 @ 14:00) (99 - 120)  BP: 140/44 (12-26-23 @ 14:00) (116/67 - 150/60)  RR: 19 (12-26-23 @ 14:00) (18 - 24)  SpO2: 99% (12-26-23 @ 14:00) (92% - 100%)  Wt(kg): --        12-25-23 @ 07:01  -  12-26-23 @ 07:00  --------------------------------------------------------  IN: 1600 mL / OUT: 4675 mL / NET: -3075 mL    12-26-23 @ 07:01  -  12-26-23 @ 16:36  --------------------------------------------------------  IN: 84 mL / OUT: 2300 mL / NET: -2216 mL      Physical Exam:  	Gen: NAD, well-appearing  	HEENT: PERRL, supple neck, clear oropharynx  	Pulm: CTA B/L  	CV: RRR, S1S2; no rub  	Back: No spinal or CVA tenderness; no sacral edema  	Abd: +BS, soft, nontender/nondistended  	: No suprapubic tenderness  	UE: Warm, FROM, no clubbing, intact strength; no edema; no asterixis  	LE: Warm, FROM, no clubbing, intact strength; no edema  	Neuro: No focal deficits, intact gait  	Psych: Normal affect and mood  	Skin: Warm, without rashes  	Vascular access:    LABS/STUDIES  --------------------------------------------------------------------------------              15.0   28.10 >-----------<  282      [12-26-23 @ 06:21]              44.6     134  |  88  |  75.1  ----------------------------<  364      [12-26-23 @ 06:21]  4.6   |  32.0  |  2.10        Ca     9.1     [12-26-23 @ 06:21]    TPro  6.5  /  Alb  3.6  /  TBili  0.9  /  DBili  0.4  /  AST  25  /  ALT  79  /  AlkPhos  114  [12-25-23 @ 09:20]      PTT: 53.0       [12-26-23 @ 09:31]      Creatinine Trend:  SCr 2.10 [12-26 @ 06:21]  SCr 1.85 [12-25 @ 09:20]  SCr 1.74 [12-24 @ 08:41]  SCr 1.65 [12-23 @ 04:35]  SCr 1.65 [12-22 @ 04:51]    Urinalysis - [12-26-23 @ 09:38]      Color Yellow / Appearance Clear / SG 1.014 / pH 6.0      Gluc 250 / Ketone Negative  / Bili Negative / Urobili 1.0       Blood Non Hemolyzed Trace / Protein Trace / Leuk Est Negative / Nitrite Negative      RBC 1 / WBC 0 / Hyaline  / Gran  / Sq Epi  / Non Sq Epi 1 / Bacteria Negative      TSH 0.34      [12-22-23 @ 04:51]  Lipid: chol 140, , HDL 70, LDL --      [12-17-23 @ 08:20]    HCV 0.12, Nonreact      [07-21-20 @ 09:27]     [facility] DIVISION OF KIDNEY DISEASES AND HYPERTENSION -- INITIAL CONSULT NOTE  --------------------------------------------------------------------------------  HPI:        PAST HISTORY  --------------------------------------------------------------------------------  PAST MEDICAL & SURGICAL HISTORY:  Diabetes Mellitus Type II      HTN (Hypertension)      Endometrial Hyperplasia      Cervical Stenosis of Spine      Spinal Stenosis, Lumbar      Deep Vein Thrombosis (DVT)  Left leg, 2004, treated and resolved      Dyslipidemia      Cataract      Morbid Obesity      Vitamin D deficiency      Insomnia      CKD (chronic kidney disease)  ~ III      Congestive heart failure  ~ HFpEF      Uterine cancer      Asthma      On home oxygen therapy      Gait difficulty  ~ u ses walker      Cataract extracted with lens implant 1998  Right      C Section 1994      Cholecystectomy/appendectomy @ age 25      D&C x2 1980's      D&C 2008  hysteroscopy, endometrial hyperplasia, 2009      Cervical Spinal Stenosis surgery x2 (01/2002, 7/2002)      Tonsillectomy as a child      Endometrial biopsy 12/02/09      H/O laser iridotomy  left eye, 2016      H/O colonoscopy  1998      S/P total abdominal hysterectomy and bilateral salpingo-oophorectomy      S/P appendectomy      S/P cholecystectomy        FAMILY HISTORY:  Family history of pancreatic cancer    Family history of bladder cancer    Family history of lung cancer (Grandparent)      PAST SOCIAL HISTORY:    ALLERGIES & MEDICATIONS  --------------------------------------------------------------------------------  Allergies    wool- rash, itch (Other)  adhesives (Rash)  latex (Rash)  Bactrim (Flushing)    Intolerances      Standing Inpatient Medications  atorvastatin 80 milliGRAM(s) Oral at bedtime  budesonide  80 MICROgram(s)/formoterol 4.5 MICROgram(s) Inhaler 2 Puff(s) Inhalation two times a day  clotrimazole 1% Cream 1 Application(s) Topical two times a day  dextrose 5%. 1000 milliLiter(s) IV Continuous <Continuous>  dextrose 5%. 1000 milliLiter(s) IV Continuous <Continuous>  dextrose 50% Injectable 25 Gram(s) IV Push once  dextrose 50% Injectable 12.5 Gram(s) IV Push once  dextrose 50% Injectable 25 Gram(s) IV Push once  famotidine    Tablet 20 milliGRAM(s) Oral daily  glucagon  Injectable 1 milliGRAM(s) IntraMuscular once  heparin  Infusion.  Unit(s)/Hr IV Continuous <Continuous>  insulin glargine Injectable (LANTUS) 50 Unit(s) SubCutaneous at bedtime  insulin glargine Injectable (LANTUS) 50 Unit(s) SubCutaneous every morning  insulin lispro (ADMELOG) corrective regimen sliding scale   SubCutaneous three times a day before meals  insulin lispro Injectable (ADMELOG) 35 Unit(s) SubCutaneous three times a day before meals  ipratropium    for Nebulization 500 MICROGram(s) Nebulizer every 6 hours  lactulose Syrup 20 Gram(s) Oral daily  levalbuterol Inhalation 0.63 milliGRAM(s) Inhalation every 6 hours  meropenem Injectable 1000 milliGRAM(s) IV Push every 8 hours  methylPREDNISolone sodium succinate Injectable 40 milliGRAM(s) IV Push every 24 hours  metoprolol tartrate 50 milliGRAM(s) Oral every 8 hours  nystatin Powder 1 Application(s) Topical two times a day  oxyCODONE  ER Tablet 60 milliGRAM(s) Oral every 12 hours  polyethylene glycol 3350 17 Gram(s) Oral daily  senna 2 Tablet(s) Oral at bedtime  vancomycin  IVPB 1500 milliGRAM(s) IV Intermittent every 24 hours    PRN Inpatient Medications  acetaminophen     Tablet .. 650 milliGRAM(s) Oral every 6 hours PRN  albuterol    90 MICROgram(s) HFA Inhaler 2 Puff(s) Inhalation every 2 hours PRN  ALPRAZolam 0.25 milliGRAM(s) Oral every 8 hours PRN  dextrose Oral Gel 15 Gram(s) Oral once PRN  diphenhydrAMINE 25 milliGRAM(s) Oral every 6 hours PRN  heparin   Injectable 5000 Unit(s) IV Push every 6 hours PRN  heparin   Injectable 06828 Unit(s) IV Push every 6 hours PRN  hydrALAZINE Injectable 10 milliGRAM(s) IV Push every 4 hours PRN  HYDROmorphone  Injectable 0.5 milliGRAM(s) IV Push every 6 hours PRN  HYDROmorphone  Injectable 1 milliGRAM(s) IV Push every 4 hours PRN  melatonin 3 milliGRAM(s) Oral at bedtime PRN  metoprolol tartrate Injectable 5 milliGRAM(s) IV Push every 6 hours PRN  ondansetron Injectable 4 milliGRAM(s) IV Push every 8 hours PRN  oxyCODONE    IR 5 milliGRAM(s) Oral every 6 hours PRN      REVIEW OF SYSTEMS  --------------------------------------------------------------------------------  Gen: No weight changes, fatigue, fevers/chills, weakness  Skin: No rashes  Head/Eyes/Ears/Mouth: No headache; Normal hearing; Normal vision w/o blurriness; No sinus pain/discomfort, sore throat  Respiratory:Dyspnea+  CV: No chest pain, PND, orthopnea  GI: No abdominal pain, diarrhea, constipation, nausea, vomiting, melena, hematochezia  : No increased frequency, dysuria, hematuria, nocturia  MSK: No joint pain/swelling; no back pain; no edema  Neuro: No dizziness/lightheadedness, weakness, seizures, numbness, tingling  Heme: No easy bruising or bleeding  Endo: No heat/cold intolerance  Psych: No significant nervousness, anxiety, stress, depression    All other systems were reviewed and are negative, except as noted.    VITALS/PHYSICAL EXAM  --------------------------------------------------------------------------------  T(C): 37.3 (12-26-23 @ 16:00), Max: 37.3 (12-26-23 @ 16:00)  HR: 99 (12-26-23 @ 14:00) (99 - 120)  BP: 140/44 (12-26-23 @ 14:00) (116/67 - 150/60)  RR: 19 (12-26-23 @ 14:00) (18 - 24)  SpO2: 99% (12-26-23 @ 14:00) (92% - 100%)  Wt(kg): --        12-25-23 @ 07:01  -  12-26-23 @ 07:00  --------------------------------------------------------  IN: 1600 mL / OUT: 4675 mL / NET: -3075 mL    12-26-23 @ 07:01  -  12-26-23 @ 16:36  --------------------------------------------------------  IN: 84 mL / OUT: 2300 mL / NET: -2216 mL      Physical Exam:  	Gen: obese womab  	HEENT: 5 L NC  	Pulm: decreased bs  	CV: RRR, S1S2; no rub  	Back: No spinal or CVA tenderness; no sacral edema  	Abd: +BS, soft, nontender/nondistended  	: No suprapubic tenderness  	UE: Warm, no edema  	LE: Warm, leg edema+  	Neuro: No focal deficit  	Psych: Normal affect and mood  	Skin: Warm,    LABS/STUDIES  --------------------------------------------------------------------------------              15.0   28.10 >-----------<  282      [12-26-23 @ 06:21]              44.6     134  |  88  |  75.1  ----------------------------<  364      [12-26-23 @ 06:21]  4.6   |  32.0  |  2.10        Ca     9.1     [12-26-23 @ 06:21]    TPro  6.5  /  Alb  3.6  /  TBili  0.9  /  DBili  0.4  /  AST  25  /  ALT  79  /  AlkPhos  114  [12-25-23 @ 09:20]      PTT: 53.0       [12-26-23 @ 09:31]      Creatinine Trend:  SCr 2.10 [12-26 @ 06:21]  SCr 1.85 [12-25 @ 09:20]  SCr 1.74 [12-24 @ 08:41]  SCr 1.65 [12-23 @ 04:35]  SCr 1.65 [12-22 @ 04:51]    Urinalysis - [12-26-23 @ 09:38]      Color Yellow / Appearance Clear / SG 1.014 / pH 6.0      Gluc 250 / Ketone Negative  / Bili Negative / Urobili 1.0       Blood Non Hemolyzed Trace / Protein Trace / Leuk Est Negative / Nitrite Negative      RBC 1 / WBC 0 / Hyaline  / Gran  / Sq Epi  / Non Sq Epi 1 / Bacteria Negative      TSH 0.34      [12-22-23 @ 04:51]  Lipid: chol 140, , HDL 70, LDL --      [12-17-23 @ 08:20]    HCV 0.12, Nonreact      [07-21-20 @ 09:27]     [facility] DIVISION OF KIDNEY DISEASES AND HYPERTENSION -- INITIAL CONSULT NOTE  --------------------------------------------------------------------------------  HPI:        PAST HISTORY  --------------------------------------------------------------------------------  PAST MEDICAL & SURGICAL HISTORY:  Diabetes Mellitus Type II      HTN (Hypertension)      Endometrial Hyperplasia      Cervical Stenosis of Spine      Spinal Stenosis, Lumbar      Deep Vein Thrombosis (DVT)  Left leg, 2004, treated and resolved      Dyslipidemia      Cataract      Morbid Obesity      Vitamin D deficiency      Insomnia      CKD (chronic kidney disease)  ~ III      Congestive heart failure  ~ HFpEF      Uterine cancer      Asthma      On home oxygen therapy      Gait difficulty  ~ u ses walker      Cataract extracted with lens implant 1998  Right      C Section 1994      Cholecystectomy/appendectomy @ age 25      D&C x2 1980's      D&C 2008  hysteroscopy, endometrial hyperplasia, 2009      Cervical Spinal Stenosis surgery x2 (01/2002, 7/2002)      Tonsillectomy as a child      Endometrial biopsy 12/02/09      H/O laser iridotomy  left eye, 2016      H/O colonoscopy  1998      S/P total abdominal hysterectomy and bilateral salpingo-oophorectomy      S/P appendectomy      S/P cholecystectomy        FAMILY HISTORY:  Family history of pancreatic cancer    Family history of bladder cancer    Family history of lung cancer (Grandparent)      PAST SOCIAL HISTORY:    ALLERGIES & MEDICATIONS  --------------------------------------------------------------------------------  Allergies    wool- rash, itch (Other)  adhesives (Rash)  latex (Rash)  Bactrim (Flushing)    Intolerances      Standing Inpatient Medications  atorvastatin 80 milliGRAM(s) Oral at bedtime  budesonide  80 MICROgram(s)/formoterol 4.5 MICROgram(s) Inhaler 2 Puff(s) Inhalation two times a day  clotrimazole 1% Cream 1 Application(s) Topical two times a day  dextrose 5%. 1000 milliLiter(s) IV Continuous <Continuous>  dextrose 5%. 1000 milliLiter(s) IV Continuous <Continuous>  dextrose 50% Injectable 25 Gram(s) IV Push once  dextrose 50% Injectable 12.5 Gram(s) IV Push once  dextrose 50% Injectable 25 Gram(s) IV Push once  famotidine    Tablet 20 milliGRAM(s) Oral daily  glucagon  Injectable 1 milliGRAM(s) IntraMuscular once  heparin  Infusion.  Unit(s)/Hr IV Continuous <Continuous>  insulin glargine Injectable (LANTUS) 50 Unit(s) SubCutaneous at bedtime  insulin glargine Injectable (LANTUS) 50 Unit(s) SubCutaneous every morning  insulin lispro (ADMELOG) corrective regimen sliding scale   SubCutaneous three times a day before meals  insulin lispro Injectable (ADMELOG) 35 Unit(s) SubCutaneous three times a day before meals  ipratropium    for Nebulization 500 MICROGram(s) Nebulizer every 6 hours  lactulose Syrup 20 Gram(s) Oral daily  levalbuterol Inhalation 0.63 milliGRAM(s) Inhalation every 6 hours  meropenem Injectable 1000 milliGRAM(s) IV Push every 8 hours  methylPREDNISolone sodium succinate Injectable 40 milliGRAM(s) IV Push every 24 hours  metoprolol tartrate 50 milliGRAM(s) Oral every 8 hours  nystatin Powder 1 Application(s) Topical two times a day  oxyCODONE  ER Tablet 60 milliGRAM(s) Oral every 12 hours  polyethylene glycol 3350 17 Gram(s) Oral daily  senna 2 Tablet(s) Oral at bedtime  vancomycin  IVPB 1500 milliGRAM(s) IV Intermittent every 24 hours    PRN Inpatient Medications  acetaminophen     Tablet .. 650 milliGRAM(s) Oral every 6 hours PRN  albuterol    90 MICROgram(s) HFA Inhaler 2 Puff(s) Inhalation every 2 hours PRN  ALPRAZolam 0.25 milliGRAM(s) Oral every 8 hours PRN  dextrose Oral Gel 15 Gram(s) Oral once PRN  diphenhydrAMINE 25 milliGRAM(s) Oral every 6 hours PRN  heparin   Injectable 5000 Unit(s) IV Push every 6 hours PRN  heparin   Injectable 25609 Unit(s) IV Push every 6 hours PRN  hydrALAZINE Injectable 10 milliGRAM(s) IV Push every 4 hours PRN  HYDROmorphone  Injectable 0.5 milliGRAM(s) IV Push every 6 hours PRN  HYDROmorphone  Injectable 1 milliGRAM(s) IV Push every 4 hours PRN  melatonin 3 milliGRAM(s) Oral at bedtime PRN  metoprolol tartrate Injectable 5 milliGRAM(s) IV Push every 6 hours PRN  ondansetron Injectable 4 milliGRAM(s) IV Push every 8 hours PRN  oxyCODONE    IR 5 milliGRAM(s) Oral every 6 hours PRN      REVIEW OF SYSTEMS  --------------------------------------------------------------------------------  Gen: No weight changes, fatigue, fevers/chills, weakness  Skin: No rashes  Head/Eyes/Ears/Mouth: No headache; Normal hearing; Normal vision w/o blurriness; No sinus pain/discomfort, sore throat  Respiratory:Dyspnea+  CV: No chest pain, PND, orthopnea  GI: No abdominal pain, diarrhea, constipation, nausea, vomiting, melena, hematochezia  : No increased frequency, dysuria, hematuria, nocturia  MSK: No joint pain/swelling; no back pain; no edema  Neuro: No dizziness/lightheadedness, weakness, seizures, numbness, tingling  Heme: No easy bruising or bleeding  Endo: No heat/cold intolerance  Psych: No significant nervousness, anxiety, stress, depression    All other systems were reviewed and are negative, except as noted.    VITALS/PHYSICAL EXAM  --------------------------------------------------------------------------------  T(C): 37.3 (12-26-23 @ 16:00), Max: 37.3 (12-26-23 @ 16:00)  HR: 99 (12-26-23 @ 14:00) (99 - 120)  BP: 140/44 (12-26-23 @ 14:00) (116/67 - 150/60)  RR: 19 (12-26-23 @ 14:00) (18 - 24)  SpO2: 99% (12-26-23 @ 14:00) (92% - 100%)  Wt(kg): --        12-25-23 @ 07:01  -  12-26-23 @ 07:00  --------------------------------------------------------  IN: 1600 mL / OUT: 4675 mL / NET: -3075 mL    12-26-23 @ 07:01  -  12-26-23 @ 16:36  --------------------------------------------------------  IN: 84 mL / OUT: 2300 mL / NET: -2216 mL      Physical Exam:  	Gen: obese womab  	HEENT: 5 L NC  	Pulm: decreased bs  	CV: RRR, S1S2; no rub  	Back: No spinal or CVA tenderness; no sacral edema  	Abd: +BS, soft, nontender/nondistended  	: No suprapubic tenderness  	UE: Warm, no edema  	LE: Warm, leg edema+  	Neuro: No focal deficit  	Psych: Normal affect and mood  	Skin: Warm,    LABS/STUDIES  --------------------------------------------------------------------------------              15.0   28.10 >-----------<  282      [12-26-23 @ 06:21]              44.6     134  |  88  |  75.1  ----------------------------<  364      [12-26-23 @ 06:21]  4.6   |  32.0  |  2.10        Ca     9.1     [12-26-23 @ 06:21]    TPro  6.5  /  Alb  3.6  /  TBili  0.9  /  DBili  0.4  /  AST  25  /  ALT  79  /  AlkPhos  114  [12-25-23 @ 09:20]      PTT: 53.0       [12-26-23 @ 09:31]      Creatinine Trend:  SCr 2.10 [12-26 @ 06:21]  SCr 1.85 [12-25 @ 09:20]  SCr 1.74 [12-24 @ 08:41]  SCr 1.65 [12-23 @ 04:35]  SCr 1.65 [12-22 @ 04:51]    Urinalysis - [12-26-23 @ 09:38]      Color Yellow / Appearance Clear / SG 1.014 / pH 6.0      Gluc 250 / Ketone Negative  / Bili Negative / Urobili 1.0       Blood Non Hemolyzed Trace / Protein Trace / Leuk Est Negative / Nitrite Negative      RBC 1 / WBC 0 / Hyaline  / Gran  / Sq Epi  / Non Sq Epi 1 / Bacteria Negative      TSH 0.34      [12-22-23 @ 04:51]  Lipid: chol 140, , HDL 70, LDL --      [12-17-23 @ 08:20]    HCV 0.12, Nonreact      [07-21-20 @ 09:27]     [facility] DIVISION OF KIDNEY DISEASES AND HYPERTENSION -- INITIAL CONSULT NOTE  --------------------------------------------------------------------------------  HPI:  68-year-old  female with history of essential hypertension type 2 diabetes mellitus spinal stenosis morbid obesity dyslipidemia vitamin D deficiency CKD stage III heart failure with preserved EF with RV failure with severe pulmonary hypertension asthma with chronic hypoxic respiratory failure with presumed PE (no CT angio of the chest with negative bilateral lower extremity Doppler on anticoagulation with apixaban) with recent admission for anal fissures  who was at rehab complaining of worsening shortness of breath started on p.o. antibiotics couple of days ago with worsening shortness of breath being admitted for   Acute on chronic hypoxic Hypercapnic respiratory failure/ RSV pneumonia/ Secondary bacterial pneumonia and CHF>  Nephrology consulted for Ned on CKD.      PAST HISTORY  --------------------------------------------------------------------------------  PAST MEDICAL & SURGICAL HISTORY:  Diabetes Mellitus Type II      HTN (Hypertension)      Endometrial Hyperplasia      Cervical Stenosis of Spine      Spinal Stenosis, Lumbar      Deep Vein Thrombosis (DVT)  Left leg, 2004, treated and resolved      Dyslipidemia      Cataract      Morbid Obesity      Vitamin D deficiency      Insomnia      CKD (chronic kidney disease)  ~ III      Congestive heart failure  ~ HFpEF      Uterine cancer      Asthma      On home oxygen therapy      Gait difficulty  ~ u ses walker      Cataract extracted with lens implant 1998  Right      C Section 1994      Cholecystectomy/appendectomy @ age 25      D&C x2 1980's      D&C 2008  hysteroscopy, endometrial hyperplasia, 2009      Cervical Spinal Stenosis surgery x2 (01/2002, 7/2002)      Tonsillectomy as a child      Endometrial biopsy 12/02/09      H/O laser iridotomy  left eye, 2016      H/O colonoscopy  1998      S/P total abdominal hysterectomy and bilateral salpingo-oophorectomy      S/P appendectomy      S/P cholecystectomy        FAMILY HISTORY:  Family history of pancreatic cancer    Family history of bladder cancer    Family history of lung cancer (Grandparent)      PAST SOCIAL HISTORY:    ALLERGIES & MEDICATIONS  --------------------------------------------------------------------------------  Allergies    wool- rash, itch (Other)  adhesives (Rash)  latex (Rash)  Bactrim (Flushing)    Intolerances      Standing Inpatient Medications  atorvastatin 80 milliGRAM(s) Oral at bedtime  budesonide  80 MICROgram(s)/formoterol 4.5 MICROgram(s) Inhaler 2 Puff(s) Inhalation two times a day  clotrimazole 1% Cream 1 Application(s) Topical two times a day  dextrose 5%. 1000 milliLiter(s) IV Continuous <Continuous>  dextrose 5%. 1000 milliLiter(s) IV Continuous <Continuous>  dextrose 50% Injectable 25 Gram(s) IV Push once  dextrose 50% Injectable 12.5 Gram(s) IV Push once  dextrose 50% Injectable 25 Gram(s) IV Push once  famotidine    Tablet 20 milliGRAM(s) Oral daily  glucagon  Injectable 1 milliGRAM(s) IntraMuscular once  heparin  Infusion.  Unit(s)/Hr IV Continuous <Continuous>  insulin glargine Injectable (LANTUS) 50 Unit(s) SubCutaneous at bedtime  insulin glargine Injectable (LANTUS) 50 Unit(s) SubCutaneous every morning  insulin lispro (ADMELOG) corrective regimen sliding scale   SubCutaneous three times a day before meals  insulin lispro Injectable (ADMELOG) 35 Unit(s) SubCutaneous three times a day before meals  ipratropium    for Nebulization 500 MICROGram(s) Nebulizer every 6 hours  lactulose Syrup 20 Gram(s) Oral daily  levalbuterol Inhalation 0.63 milliGRAM(s) Inhalation every 6 hours  meropenem Injectable 1000 milliGRAM(s) IV Push every 8 hours  methylPREDNISolone sodium succinate Injectable 40 milliGRAM(s) IV Push every 24 hours  metoprolol tartrate 50 milliGRAM(s) Oral every 8 hours  nystatin Powder 1 Application(s) Topical two times a day  oxyCODONE  ER Tablet 60 milliGRAM(s) Oral every 12 hours  polyethylene glycol 3350 17 Gram(s) Oral daily  senna 2 Tablet(s) Oral at bedtime  vancomycin  IVPB 1500 milliGRAM(s) IV Intermittent every 24 hours    PRN Inpatient Medications  acetaminophen     Tablet .. 650 milliGRAM(s) Oral every 6 hours PRN  albuterol    90 MICROgram(s) HFA Inhaler 2 Puff(s) Inhalation every 2 hours PRN  ALPRAZolam 0.25 milliGRAM(s) Oral every 8 hours PRN  dextrose Oral Gel 15 Gram(s) Oral once PRN  diphenhydrAMINE 25 milliGRAM(s) Oral every 6 hours PRN  heparin   Injectable 5000 Unit(s) IV Push every 6 hours PRN  heparin   Injectable 23633 Unit(s) IV Push every 6 hours PRN  hydrALAZINE Injectable 10 milliGRAM(s) IV Push every 4 hours PRN  HYDROmorphone  Injectable 0.5 milliGRAM(s) IV Push every 6 hours PRN  HYDROmorphone  Injectable 1 milliGRAM(s) IV Push every 4 hours PRN  melatonin 3 milliGRAM(s) Oral at bedtime PRN  metoprolol tartrate Injectable 5 milliGRAM(s) IV Push every 6 hours PRN  ondansetron Injectable 4 milliGRAM(s) IV Push every 8 hours PRN  oxyCODONE    IR 5 milliGRAM(s) Oral every 6 hours PRN      REVIEW OF SYSTEMS  --------------------------------------------------------------------------------  Gen: No weight changes, fatigue, fevers/chills, weakness  Skin: No rashes  Head/Eyes/Ears/Mouth: No headache; Normal hearing; Normal vision w/o blurriness; No sinus pain/discomfort, sore throat  Respiratory:Dyspnea+  CV: No chest pain, PND, orthopnea  GI: No abdominal pain, diarrhea, constipation, nausea, vomiting, melena, hematochezia  : No increased frequency, dysuria, hematuria, nocturia  MSK: No joint pain/swelling; no back pain; no edema  Neuro: No dizziness/lightheadedness, weakness, seizures, numbness, tingling  Heme: No easy bruising or bleeding  Endo: No heat/cold intolerance  Psych: No significant nervousness, anxiety, stress, depression    All other systems were reviewed and are negative, except as noted.    VITALS/PHYSICAL EXAM  --------------------------------------------------------------------------------  T(C): 37.3 (12-26-23 @ 16:00), Max: 37.3 (12-26-23 @ 16:00)  HR: 99 (12-26-23 @ 14:00) (99 - 120)  BP: 140/44 (12-26-23 @ 14:00) (116/67 - 150/60)  RR: 19 (12-26-23 @ 14:00) (18 - 24)  SpO2: 99% (12-26-23 @ 14:00) (92% - 100%)  Wt(kg): --        12-25-23 @ 07:01  -  12-26-23 @ 07:00  --------------------------------------------------------  IN: 1600 mL / OUT: 4675 mL / NET: -3075 mL    12-26-23 @ 07:01  -  12-26-23 @ 16:36  --------------------------------------------------------  IN: 84 mL / OUT: 2300 mL / NET: -2216 mL      Physical Exam:  	Gen: obese womab  	HEENT: 5 L NC  	Pulm: decreased bs  	CV: RRR, S1S2; no rub  	Back: No spinal or CVA tenderness; no sacral edema  	Abd: +BS, soft, nontender/nondistended  	: No suprapubic tenderness  	UE: Warm, no edema  	LE: Warm, leg edema+  	Neuro: No focal deficit  	Psych: Normal affect and mood  	Skin: Warm,    LABS/STUDIES  --------------------------------------------------------------------------------              15.0   28.10 >-----------<  282      [12-26-23 @ 06:21]              44.6     134  |  88  |  75.1  ----------------------------<  364      [12-26-23 @ 06:21]  4.6   |  32.0  |  2.10        Ca     9.1     [12-26-23 @ 06:21]    TPro  6.5  /  Alb  3.6  /  TBili  0.9  /  DBili  0.4  /  AST  25  /  ALT  79  /  AlkPhos  114  [12-25-23 @ 09:20]      PTT: 53.0       [12-26-23 @ 09:31]      Creatinine Trend:  SCr 2.10 [12-26 @ 06:21]  SCr 1.85 [12-25 @ 09:20]  SCr 1.74 [12-24 @ 08:41]  SCr 1.65 [12-23 @ 04:35]  SCr 1.65 [12-22 @ 04:51]    Urinalysis - [12-26-23 @ 09:38]      Color Yellow / Appearance Clear / SG 1.014 / pH 6.0      Gluc 250 / Ketone Negative  / Bili Negative / Urobili 1.0       Blood Non Hemolyzed Trace / Protein Trace / Leuk Est Negative / Nitrite Negative      RBC 1 / WBC 0 / Hyaline  / Gran  / Sq Epi  / Non Sq Epi 1 / Bacteria Negative      TSH 0.34      [12-22-23 @ 04:51]  Lipid: chol 140, , HDL 70, LDL --      [12-17-23 @ 08:20]    HCV 0.12, Nonreact      [07-21-20 @ 09:27]     [facility] DIVISION OF KIDNEY DISEASES AND HYPERTENSION -- INITIAL CONSULT NOTE  --------------------------------------------------------------------------------  HPI:  68-year-old  female with history of essential hypertension type 2 diabetes mellitus spinal stenosis morbid obesity dyslipidemia vitamin D deficiency CKD stage III heart failure with preserved EF with RV failure with severe pulmonary hypertension asthma with chronic hypoxic respiratory failure with presumed PE (no CT angio of the chest with negative bilateral lower extremity Doppler on anticoagulation with apixaban) with recent admission for anal fissures  who was at rehab complaining of worsening shortness of breath started on p.o. antibiotics couple of days ago with worsening shortness of breath being admitted for   Acute on chronic hypoxic Hypercapnic respiratory failure/ RSV pneumonia/ Secondary bacterial pneumonia and CHF>  Nephrology consulted for Ned on CKD.      PAST HISTORY  --------------------------------------------------------------------------------  PAST MEDICAL & SURGICAL HISTORY:  Diabetes Mellitus Type II      HTN (Hypertension)      Endometrial Hyperplasia      Cervical Stenosis of Spine      Spinal Stenosis, Lumbar      Deep Vein Thrombosis (DVT)  Left leg, 2004, treated and resolved      Dyslipidemia      Cataract      Morbid Obesity      Vitamin D deficiency      Insomnia      CKD (chronic kidney disease)  ~ III      Congestive heart failure  ~ HFpEF      Uterine cancer      Asthma      On home oxygen therapy      Gait difficulty  ~ u ses walker      Cataract extracted with lens implant 1998  Right      C Section 1994      Cholecystectomy/appendectomy @ age 25      D&C x2 1980's      D&C 2008  hysteroscopy, endometrial hyperplasia, 2009      Cervical Spinal Stenosis surgery x2 (01/2002, 7/2002)      Tonsillectomy as a child      Endometrial biopsy 12/02/09      H/O laser iridotomy  left eye, 2016      H/O colonoscopy  1998      S/P total abdominal hysterectomy and bilateral salpingo-oophorectomy      S/P appendectomy      S/P cholecystectomy        FAMILY HISTORY:  Family history of pancreatic cancer    Family history of bladder cancer    Family history of lung cancer (Grandparent)      PAST SOCIAL HISTORY:    ALLERGIES & MEDICATIONS  --------------------------------------------------------------------------------  Allergies    wool- rash, itch (Other)  adhesives (Rash)  latex (Rash)  Bactrim (Flushing)    Intolerances      Standing Inpatient Medications  atorvastatin 80 milliGRAM(s) Oral at bedtime  budesonide  80 MICROgram(s)/formoterol 4.5 MICROgram(s) Inhaler 2 Puff(s) Inhalation two times a day  clotrimazole 1% Cream 1 Application(s) Topical two times a day  dextrose 5%. 1000 milliLiter(s) IV Continuous <Continuous>  dextrose 5%. 1000 milliLiter(s) IV Continuous <Continuous>  dextrose 50% Injectable 25 Gram(s) IV Push once  dextrose 50% Injectable 12.5 Gram(s) IV Push once  dextrose 50% Injectable 25 Gram(s) IV Push once  famotidine    Tablet 20 milliGRAM(s) Oral daily  glucagon  Injectable 1 milliGRAM(s) IntraMuscular once  heparin  Infusion.  Unit(s)/Hr IV Continuous <Continuous>  insulin glargine Injectable (LANTUS) 50 Unit(s) SubCutaneous at bedtime  insulin glargine Injectable (LANTUS) 50 Unit(s) SubCutaneous every morning  insulin lispro (ADMELOG) corrective regimen sliding scale   SubCutaneous three times a day before meals  insulin lispro Injectable (ADMELOG) 35 Unit(s) SubCutaneous three times a day before meals  ipratropium    for Nebulization 500 MICROGram(s) Nebulizer every 6 hours  lactulose Syrup 20 Gram(s) Oral daily  levalbuterol Inhalation 0.63 milliGRAM(s) Inhalation every 6 hours  meropenem Injectable 1000 milliGRAM(s) IV Push every 8 hours  methylPREDNISolone sodium succinate Injectable 40 milliGRAM(s) IV Push every 24 hours  metoprolol tartrate 50 milliGRAM(s) Oral every 8 hours  nystatin Powder 1 Application(s) Topical two times a day  oxyCODONE  ER Tablet 60 milliGRAM(s) Oral every 12 hours  polyethylene glycol 3350 17 Gram(s) Oral daily  senna 2 Tablet(s) Oral at bedtime  vancomycin  IVPB 1500 milliGRAM(s) IV Intermittent every 24 hours    PRN Inpatient Medications  acetaminophen     Tablet .. 650 milliGRAM(s) Oral every 6 hours PRN  albuterol    90 MICROgram(s) HFA Inhaler 2 Puff(s) Inhalation every 2 hours PRN  ALPRAZolam 0.25 milliGRAM(s) Oral every 8 hours PRN  dextrose Oral Gel 15 Gram(s) Oral once PRN  diphenhydrAMINE 25 milliGRAM(s) Oral every 6 hours PRN  heparin   Injectable 5000 Unit(s) IV Push every 6 hours PRN  heparin   Injectable 22501 Unit(s) IV Push every 6 hours PRN  hydrALAZINE Injectable 10 milliGRAM(s) IV Push every 4 hours PRN  HYDROmorphone  Injectable 0.5 milliGRAM(s) IV Push every 6 hours PRN  HYDROmorphone  Injectable 1 milliGRAM(s) IV Push every 4 hours PRN  melatonin 3 milliGRAM(s) Oral at bedtime PRN  metoprolol tartrate Injectable 5 milliGRAM(s) IV Push every 6 hours PRN  ondansetron Injectable 4 milliGRAM(s) IV Push every 8 hours PRN  oxyCODONE    IR 5 milliGRAM(s) Oral every 6 hours PRN      REVIEW OF SYSTEMS  --------------------------------------------------------------------------------  Gen: No weight changes, fatigue, fevers/chills, weakness  Skin: No rashes  Head/Eyes/Ears/Mouth: No headache; Normal hearing; Normal vision w/o blurriness; No sinus pain/discomfort, sore throat  Respiratory:Dyspnea+  CV: No chest pain, PND, orthopnea  GI: No abdominal pain, diarrhea, constipation, nausea, vomiting, melena, hematochezia  : No increased frequency, dysuria, hematuria, nocturia  MSK: No joint pain/swelling; no back pain; no edema  Neuro: No dizziness/lightheadedness, weakness, seizures, numbness, tingling  Heme: No easy bruising or bleeding  Endo: No heat/cold intolerance  Psych: No significant nervousness, anxiety, stress, depression    All other systems were reviewed and are negative, except as noted.    VITALS/PHYSICAL EXAM  --------------------------------------------------------------------------------  T(C): 37.3 (12-26-23 @ 16:00), Max: 37.3 (12-26-23 @ 16:00)  HR: 99 (12-26-23 @ 14:00) (99 - 120)  BP: 140/44 (12-26-23 @ 14:00) (116/67 - 150/60)  RR: 19 (12-26-23 @ 14:00) (18 - 24)  SpO2: 99% (12-26-23 @ 14:00) (92% - 100%)  Wt(kg): --        12-25-23 @ 07:01  -  12-26-23 @ 07:00  --------------------------------------------------------  IN: 1600 mL / OUT: 4675 mL / NET: -3075 mL    12-26-23 @ 07:01  -  12-26-23 @ 16:36  --------------------------------------------------------  IN: 84 mL / OUT: 2300 mL / NET: -2216 mL      Physical Exam:  	Gen: obese womab  	HEENT: 5 L NC  	Pulm: decreased bs  	CV: RRR, S1S2; no rub  	Back: No spinal or CVA tenderness; no sacral edema  	Abd: +BS, soft, nontender/nondistended  	: No suprapubic tenderness  	UE: Warm, no edema  	LE: Warm, leg edema+  	Neuro: No focal deficit  	Psych: Normal affect and mood  	Skin: Warm,    LABS/STUDIES  --------------------------------------------------------------------------------              15.0   28.10 >-----------<  282      [12-26-23 @ 06:21]              44.6     134  |  88  |  75.1  ----------------------------<  364      [12-26-23 @ 06:21]  4.6   |  32.0  |  2.10        Ca     9.1     [12-26-23 @ 06:21]    TPro  6.5  /  Alb  3.6  /  TBili  0.9  /  DBili  0.4  /  AST  25  /  ALT  79  /  AlkPhos  114  [12-25-23 @ 09:20]      PTT: 53.0       [12-26-23 @ 09:31]      Creatinine Trend:  SCr 2.10 [12-26 @ 06:21]  SCr 1.85 [12-25 @ 09:20]  SCr 1.74 [12-24 @ 08:41]  SCr 1.65 [12-23 @ 04:35]  SCr 1.65 [12-22 @ 04:51]    Urinalysis - [12-26-23 @ 09:38]      Color Yellow / Appearance Clear / SG 1.014 / pH 6.0      Gluc 250 / Ketone Negative  / Bili Negative / Urobili 1.0       Blood Non Hemolyzed Trace / Protein Trace / Leuk Est Negative / Nitrite Negative      RBC 1 / WBC 0 / Hyaline  / Gran  / Sq Epi  / Non Sq Epi 1 / Bacteria Negative      TSH 0.34      [12-22-23 @ 04:51]  Lipid: chol 140, , HDL 70, LDL --      [12-17-23 @ 08:20]    HCV 0.12, Nonreact      [07-21-20 @ 09:27]

## 2023-12-26 NOTE — PROGRESS NOTE ADULT - ASSESSMENT
67 yo female with HTN, HLD, DM2, HFpEF, CKD III, DVT, Uterine CA, COPD on home o2 who presented with complaints of shortness of breath. Patient reports that she has been sick all week and was recently started on Vantin and steroids while at the nursing home. Patient reports that her dyspnea just worsened and she told the nursing home to send her in for an evaluation. While in the ED, patient was placed on BIPAP and was given IV lasix with some improvement. RVP then results as RSV +. Patient had a CT scan which also showed pneumonia and pulmonary HTN. Admission to medicine was requested for further management.    #new afib:  echo w/ preserved EF  - EP consulted, no DCCV given resp issues  - cardiology following, plan for R/LHC when resp condition improves  - HR better controlled today  - c/w loprssor 50mg q8hr  - c/w heparin drip for [id number] 6 until LHC with cardio    # Acute on chronic respiratory failure  # Acute HFpEF exacerbation with likely with Superimposed bacterial Pneumonia in setting of RSV  # Worsening PNA  - WBC still trending up  - CT chest with worsening middle lobe PNA  - pt able to weaned off HFNC today  - sputum clx ordered for last several days, nurse unable to collect  - repeat MRSA PCR negative  - s/p zosyn [id number], will change to meropenem and vanc  - f/u repeat blood clx  - decrease IV steroids to 40mg qd today  - levalbuterol and ipratropium q6hrs, symbicort BID   - stop IV Lasix  - BIPAP qhs  - pulm following     # JACQUES  - likely combination of pre-renal and ATN  - stop lasix  - nephrology consulted    #Chronic pain  - continue home Pain meds of Oxycodone 60 mg q12h and Oxycodone 5mg q6h PRN    # hx of suspicion for PE. DVT  - Patient has empirically been treated for PE/DVT since August  - No scans noted to have PE/DVT  - duplex lower ext to without dvt   - ddimer negative    # HLD  - Atorvastatin for rosuvastatin    # COPD  - Steroids as above  - Symbicort, Spiriva, albuterol  - pulm following     #DM  #steroid induced hyperglycemia  - lantus/premeal insulin per endo  - lispro sliding scale    DVT prophylaxis: heparin ggt  Dispo: pending resp status optimization     69 yo female with HTN, HLD, DM2, HFpEF, CKD III, DVT, Uterine CA, COPD on home o2 who presented with complaints of shortness of breath. Patient reports that she has been sick all week and was recently started on Vantin and steroids while at the nursing home. Patient reports that her dyspnea just worsened and she told the nursing home to send her in for an evaluation. While in the ED, patient was placed on BIPAP and was given IV lasix with some improvement. RVP then results as RSV +. Patient had a CT scan which also showed pneumonia and pulmonary HTN. Admission to medicine was requested for further management.    #new afib:  echo w/ preserved EF  - EP consulted, no DCCV given resp issues  - cardiology following, plan for R/LHC when resp condition improves  - HR better controlled today  - c/w loprssor 50mg q8hr  - c/w heparin drip for [id number] 6 until LHC with cardio    # Acute on chronic respiratory failure  # Acute HFpEF exacerbation with likely with Superimposed bacterial Pneumonia in setting of RSV  # Worsening PNA  - WBC still trending up  - CT chest with worsening middle lobe PNA  - pt able to weaned off HFNC today  - sputum clx ordered for last several days, nurse unable to collect  - repeat MRSA PCR negative  - s/p zosyn [id number], will change to meropenem and vanc  - f/u repeat blood clx  - decrease IV steroids to 40mg qd today  - levalbuterol and ipratropium q6hrs, symbicort BID   - stop IV Lasix  - BIPAP qhs  - pulm following     # JACQUES  - likely combination of pre-renal and ATN  - stop lasix  - nephrology consulted    #Chronic pain  - continue home Pain meds of Oxycodone 60 mg q12h and Oxycodone 5mg q6h PRN    # hx of suspicion for PE. DVT  - Patient has empirically been treated for PE/DVT since August  - No scans noted to have PE/DVT  - duplex lower ext to without dvt   - ddimer negative    # HLD  - Atorvastatin for rosuvastatin    # COPD  - Steroids as above  - Symbicort, Spiriva, albuterol  - pulm following     #DM  #steroid induced hyperglycemia  - lantus/premeal insulin per endo  - lispro sliding scale    DVT prophylaxis: heparin ggt  Dispo: pending resp status optimization

## 2023-12-27 LAB
ANION GAP SERPL CALC-SCNC: 14 MMOL/L — SIGNIFICANT CHANGE UP (ref 5–17)
ANION GAP SERPL CALC-SCNC: 14 MMOL/L — SIGNIFICANT CHANGE UP (ref 5–17)
APTT BLD: 60 SEC — HIGH (ref 24.5–35.6)
APTT BLD: 60 SEC — HIGH (ref 24.5–35.6)
BUN SERPL-MCNC: 74.2 MG/DL — HIGH (ref 8–20)
BUN SERPL-MCNC: 74.2 MG/DL — HIGH (ref 8–20)
CALCIUM SERPL-MCNC: 9 MG/DL — SIGNIFICANT CHANGE UP (ref 8.4–10.5)
CALCIUM SERPL-MCNC: 9 MG/DL — SIGNIFICANT CHANGE UP (ref 8.4–10.5)
CHLORIDE SERPL-SCNC: 92 MMOL/L — LOW (ref 96–108)
CHLORIDE SERPL-SCNC: 92 MMOL/L — LOW (ref 96–108)
CO2 SERPL-SCNC: 30 MMOL/L — HIGH (ref 22–29)
CO2 SERPL-SCNC: 30 MMOL/L — HIGH (ref 22–29)
CREAT SERPL-MCNC: 2.24 MG/DL — HIGH (ref 0.5–1.3)
CREAT SERPL-MCNC: 2.24 MG/DL — HIGH (ref 0.5–1.3)
DIGOXIN SERPL-MCNC: <0.4 NG/ML — LOW (ref 0.8–2)
DIGOXIN SERPL-MCNC: <0.4 NG/ML — LOW (ref 0.8–2)
EGFR: 23 ML/MIN/1.73M2 — LOW
EGFR: 23 ML/MIN/1.73M2 — LOW
GLUCOSE BLDC GLUCOMTR-MCNC: 195 MG/DL — HIGH (ref 70–99)
GLUCOSE BLDC GLUCOMTR-MCNC: 195 MG/DL — HIGH (ref 70–99)
GLUCOSE BLDC GLUCOMTR-MCNC: 260 MG/DL — HIGH (ref 70–99)
GLUCOSE BLDC GLUCOMTR-MCNC: 260 MG/DL — HIGH (ref 70–99)
GLUCOSE BLDC GLUCOMTR-MCNC: 283 MG/DL — HIGH (ref 70–99)
GLUCOSE BLDC GLUCOMTR-MCNC: 283 MG/DL — HIGH (ref 70–99)
GLUCOSE BLDC GLUCOMTR-MCNC: 378 MG/DL — HIGH (ref 70–99)
GLUCOSE BLDC GLUCOMTR-MCNC: 378 MG/DL — HIGH (ref 70–99)
GLUCOSE SERPL-MCNC: 303 MG/DL — HIGH (ref 70–99)
GLUCOSE SERPL-MCNC: 303 MG/DL — HIGH (ref 70–99)
HCT VFR BLD CALC: 43.1 % — SIGNIFICANT CHANGE UP (ref 34.5–45)
HCT VFR BLD CALC: 43.1 % — SIGNIFICANT CHANGE UP (ref 34.5–45)
HGB BLD-MCNC: 14 G/DL — SIGNIFICANT CHANGE UP (ref 11.5–15.5)
HGB BLD-MCNC: 14 G/DL — SIGNIFICANT CHANGE UP (ref 11.5–15.5)
MAGNESIUM SERPL-MCNC: 2.6 MG/DL — SIGNIFICANT CHANGE UP (ref 1.6–2.6)
MAGNESIUM SERPL-MCNC: 2.6 MG/DL — SIGNIFICANT CHANGE UP (ref 1.6–2.6)
MCHC RBC-ENTMCNC: 28.8 PG — SIGNIFICANT CHANGE UP (ref 27–34)
MCHC RBC-ENTMCNC: 28.8 PG — SIGNIFICANT CHANGE UP (ref 27–34)
MCHC RBC-ENTMCNC: 32.5 GM/DL — SIGNIFICANT CHANGE UP (ref 32–36)
MCHC RBC-ENTMCNC: 32.5 GM/DL — SIGNIFICANT CHANGE UP (ref 32–36)
MCV RBC AUTO: 88.7 FL — SIGNIFICANT CHANGE UP (ref 80–100)
MCV RBC AUTO: 88.7 FL — SIGNIFICANT CHANGE UP (ref 80–100)
PLATELET # BLD AUTO: 261 K/UL — SIGNIFICANT CHANGE UP (ref 150–400)
PLATELET # BLD AUTO: 261 K/UL — SIGNIFICANT CHANGE UP (ref 150–400)
POTASSIUM SERPL-MCNC: 4.8 MMOL/L — SIGNIFICANT CHANGE UP (ref 3.5–5.3)
POTASSIUM SERPL-MCNC: 4.8 MMOL/L — SIGNIFICANT CHANGE UP (ref 3.5–5.3)
POTASSIUM SERPL-SCNC: 4.8 MMOL/L — SIGNIFICANT CHANGE UP (ref 3.5–5.3)
POTASSIUM SERPL-SCNC: 4.8 MMOL/L — SIGNIFICANT CHANGE UP (ref 3.5–5.3)
RBC # BLD: 4.86 M/UL — SIGNIFICANT CHANGE UP (ref 3.8–5.2)
RBC # BLD: 4.86 M/UL — SIGNIFICANT CHANGE UP (ref 3.8–5.2)
RBC # FLD: 13.3 % — SIGNIFICANT CHANGE UP (ref 10.3–14.5)
RBC # FLD: 13.3 % — SIGNIFICANT CHANGE UP (ref 10.3–14.5)
SODIUM SERPL-SCNC: 136 MMOL/L — SIGNIFICANT CHANGE UP (ref 135–145)
SODIUM SERPL-SCNC: 136 MMOL/L — SIGNIFICANT CHANGE UP (ref 135–145)
VANCOMYCIN TROUGH SERPL-MCNC: 6.3 UG/ML — LOW (ref 10–20)
VANCOMYCIN TROUGH SERPL-MCNC: 6.3 UG/ML — LOW (ref 10–20)
WBC # BLD: 30.67 K/UL — HIGH (ref 3.8–10.5)
WBC # BLD: 30.67 K/UL — HIGH (ref 3.8–10.5)
WBC # FLD AUTO: 30.67 K/UL — HIGH (ref 3.8–10.5)
WBC # FLD AUTO: 30.67 K/UL — HIGH (ref 3.8–10.5)

## 2023-12-27 PROCEDURE — 99232 SBSQ HOSP IP/OBS MODERATE 35: CPT

## 2023-12-27 RX ORDER — INSULIN LISPRO 100/ML
40 VIAL (ML) SUBCUTANEOUS
Refills: 0 | Status: DISCONTINUED | OUTPATIENT
Start: 2023-12-27 | End: 2023-12-29

## 2023-12-27 RX ORDER — SODIUM CHLORIDE 9 MG/ML
250 INJECTION INTRAMUSCULAR; INTRAVENOUS; SUBCUTANEOUS ONCE
Refills: 0 | Status: COMPLETED | OUTPATIENT
Start: 2023-12-27 | End: 2023-12-27

## 2023-12-27 RX ADMIN — Medication 4: at 17:28

## 2023-12-27 RX ADMIN — ONDANSETRON 4 MILLIGRAM(S): 8 TABLET, FILM COATED ORAL at 06:55

## 2023-12-27 RX ADMIN — Medication 40 UNIT(S): at 17:29

## 2023-12-27 RX ADMIN — OXYCODONE HYDROCHLORIDE 60 MILLIGRAM(S): 5 TABLET ORAL at 05:33

## 2023-12-27 RX ADMIN — HEPARIN SODIUM 1000 UNIT(S)/HR: 5000 INJECTION INTRAVENOUS; SUBCUTANEOUS at 19:36

## 2023-12-27 RX ADMIN — Medication 35 UNIT(S): at 08:28

## 2023-12-27 RX ADMIN — SODIUM CHLORIDE 250 MILLILITER(S): 9 INJECTION INTRAMUSCULAR; INTRAVENOUS; SUBCUTANEOUS at 17:27

## 2023-12-27 RX ADMIN — MEROPENEM 1000 MILLIGRAM(S): 1 INJECTION INTRAVENOUS at 05:22

## 2023-12-27 RX ADMIN — MEROPENEM 1000 MILLIGRAM(S): 1 INJECTION INTRAVENOUS at 13:15

## 2023-12-27 RX ADMIN — Medication 500 MICROGRAM(S): at 14:11

## 2023-12-27 RX ADMIN — Medication 50 MILLIGRAM(S): at 05:32

## 2023-12-27 RX ADMIN — Medication 12: at 11:17

## 2023-12-27 RX ADMIN — OXYCODONE HYDROCHLORIDE 5 MILLIGRAM(S): 5 TABLET ORAL at 21:46

## 2023-12-27 RX ADMIN — BUDESONIDE AND FORMOTEROL FUMARATE DIHYDRATE 2 PUFF(S): 160; 4.5 AEROSOL RESPIRATORY (INHALATION) at 09:41

## 2023-12-27 RX ADMIN — HEPARIN SODIUM 1000 UNIT(S)/HR: 5000 INJECTION INTRAVENOUS; SUBCUTANEOUS at 08:36

## 2023-12-27 RX ADMIN — Medication 300 MILLIGRAM(S): at 08:28

## 2023-12-27 RX ADMIN — FAMOTIDINE 20 MILLIGRAM(S): 10 INJECTION INTRAVENOUS at 11:16

## 2023-12-27 RX ADMIN — OXYCODONE HYDROCHLORIDE 60 MILLIGRAM(S): 5 TABLET ORAL at 17:28

## 2023-12-27 RX ADMIN — LEVALBUTEROL 0.63 MILLIGRAM(S): 1.25 SOLUTION, CONCENTRATE RESPIRATORY (INHALATION) at 14:10

## 2023-12-27 RX ADMIN — LEVALBUTEROL 0.63 MILLIGRAM(S): 1.25 SOLUTION, CONCENTRATE RESPIRATORY (INHALATION) at 09:40

## 2023-12-27 RX ADMIN — Medication 1 APPLICATION(S): at 17:30

## 2023-12-27 RX ADMIN — Medication 35 UNIT(S): at 11:17

## 2023-12-27 RX ADMIN — ATORVASTATIN CALCIUM 80 MILLIGRAM(S): 80 TABLET, FILM COATED ORAL at 21:46

## 2023-12-27 RX ADMIN — POLYETHYLENE GLYCOL 3350 17 GRAM(S): 17 POWDER, FOR SOLUTION ORAL at 11:17

## 2023-12-27 RX ADMIN — Medication 500 MICROGRAM(S): at 20:40

## 2023-12-27 RX ADMIN — Medication 50 MILLIGRAM(S): at 21:46

## 2023-12-27 RX ADMIN — HEPARIN SODIUM 1000 UNIT(S)/HR: 5000 INJECTION INTRAVENOUS; SUBCUTANEOUS at 15:20

## 2023-12-27 RX ADMIN — LEVALBUTEROL 0.63 MILLIGRAM(S): 1.25 SOLUTION, CONCENTRATE RESPIRATORY (INHALATION) at 20:40

## 2023-12-27 RX ADMIN — Medication 50 MILLIGRAM(S): at 13:15

## 2023-12-27 RX ADMIN — Medication 500 MICROGRAM(S): at 09:40

## 2023-12-27 RX ADMIN — MEROPENEM 1000 MILLIGRAM(S): 1 INJECTION INTRAVENOUS at 21:46

## 2023-12-27 RX ADMIN — INSULIN GLARGINE 50 UNIT(S): 100 INJECTION, SOLUTION SUBCUTANEOUS at 22:18

## 2023-12-27 RX ADMIN — Medication 0.25 MILLIGRAM(S): at 10:22

## 2023-12-27 RX ADMIN — Medication 8: at 08:28

## 2023-12-27 RX ADMIN — NYSTATIN CREAM 1 APPLICATION(S): 100000 CREAM TOPICAL at 17:29

## 2023-12-27 RX ADMIN — Medication 1 APPLICATION(S): at 05:30

## 2023-12-27 RX ADMIN — NYSTATIN CREAM 1 APPLICATION(S): 100000 CREAM TOPICAL at 05:32

## 2023-12-27 RX ADMIN — SENNA PLUS 2 TABLET(S): 8.6 TABLET ORAL at 21:46

## 2023-12-27 RX ADMIN — INSULIN GLARGINE 50 UNIT(S): 100 INJECTION, SOLUTION SUBCUTANEOUS at 08:28

## 2023-12-27 RX ADMIN — Medication 50 MILLIGRAM(S): at 11:16

## 2023-12-27 RX ADMIN — BUDESONIDE AND FORMOTEROL FUMARATE DIHYDRATE 2 PUFF(S): 160; 4.5 AEROSOL RESPIRATORY (INHALATION) at 20:41

## 2023-12-27 RX ADMIN — Medication 0.25 MILLIGRAM(S): at 00:14

## 2023-12-27 RX ADMIN — LACTULOSE 20 GRAM(S): 10 SOLUTION ORAL at 11:16

## 2023-12-27 NOTE — PROGRESS NOTE ADULT - SUBJECTIVE AND OBJECTIVE BOX
INTERVAL EVENTS:  Follow up diabetes management  Steroid induced hyperglycemia    ROS: Denies chest pain, sob. Complains of left sided pain near ribs.    MEDICATIONS  (STANDING):  atorvastatin 80 milliGRAM(s) Oral at bedtime  budesonide  80 MICROgram(s)/formoterol 4.5 MICROgram(s) Inhaler 2 Puff(s) Inhalation two times a day  clotrimazole 1% Cream 1 Application(s) Topical two times a day  dextrose 5%. 1000 milliLiter(s) (50 mL/Hr) IV Continuous <Continuous>  dextrose 5%. 1000 milliLiter(s) (100 mL/Hr) IV Continuous <Continuous>  dextrose 50% Injectable 25 Gram(s) IV Push once  dextrose 50% Injectable 12.5 Gram(s) IV Push once  dextrose 50% Injectable 25 Gram(s) IV Push once  famotidine    Tablet 20 milliGRAM(s) Oral daily  glucagon  Injectable 1 milliGRAM(s) IntraMuscular once  heparin  Infusion.  Unit(s)/Hr (24 mL/Hr) IV Continuous <Continuous>  insulin glargine Injectable (LANTUS) 50 Unit(s) SubCutaneous at bedtime  insulin glargine Injectable (LANTUS) 50 Unit(s) SubCutaneous every morning  insulin lispro (ADMELOG) corrective regimen sliding scale   SubCutaneous three times a day before meals  insulin lispro Injectable (ADMELOG) 40 Unit(s) SubCutaneous three times a day before meals  ipratropium    for Nebulization 500 MICROGram(s) Nebulizer every 6 hours  lactulose Syrup 20 Gram(s) Oral daily  levalbuterol Inhalation 0.63 milliGRAM(s) Inhalation every 6 hours  meropenem Injectable 1000 milliGRAM(s) IV Push every 8 hours  metoprolol tartrate 50 milliGRAM(s) Oral every 8 hours  nystatin Powder 1 Application(s) Topical two times a day  oxyCODONE  ER Tablet 60 milliGRAM(s) Oral every 12 hours  polyethylene glycol 3350 17 Gram(s) Oral daily  senna 2 Tablet(s) Oral at bedtime  vancomycin  IVPB 1500 milliGRAM(s) IV Intermittent every 24 hours    MEDICATIONS  (PRN):  acetaminophen     Tablet .. 650 milliGRAM(s) Oral every 6 hours PRN Temp greater or equal to 38C (100.4F), Mild Pain (1 - 3)  albuterol    90 MICROgram(s) HFA Inhaler 2 Puff(s) Inhalation every 2 hours PRN Shortness of Breath and/or Wheezing  ALPRAZolam 0.25 milliGRAM(s) Oral every 8 hours PRN for anxiety/Sleep  dextrose Oral Gel 15 Gram(s) Oral once PRN Blood Glucose LESS THAN 70 milliGRAM(s)/deciliter  diphenhydrAMINE 25 milliGRAM(s) Oral every 6 hours PRN Rash and/or Itching  heparin   Injectable 5000 Unit(s) IV Push every 6 hours PRN For aPTT between 40 - 57  heparin   Injectable 53652 Unit(s) IV Push every 6 hours PRN For aPTT less than 40  hydrALAZINE Injectable 10 milliGRAM(s) IV Push every 4 hours PRN for systolic bp > 160  HYDROmorphone  Injectable 1 milliGRAM(s) IV Push every 4 hours PRN breakthrough  pain  melatonin 3 milliGRAM(s) Oral at bedtime PRN Insomnia  metoprolol tartrate Injectable 5 milliGRAM(s) IV Push every 6 hours PRN heart rate sustaining greater than 130  ondansetron Injectable 4 milliGRAM(s) IV Push every 8 hours PRN Nausea and/or Vomiting  oxyCODONE    IR 5 milliGRAM(s) Oral every 6 hours PRN Moderate Pain (4 - 6)    Allergies  wool- rash, itch (Other)  adhesives (Rash)  latex (Rash)  Bactrim (Flushing)    Vital Signs Last 24 Hrs  T(C): 37.2 (27 Dec 2023 12:00), Max: 37.3 (26 Dec 2023 16:00)  T(F): 99 (27 Dec 2023 12:00), Max: 99.1 (26 Dec 2023 16:00)  HR: 100 (27 Dec 2023 12:00) (88 - 119)  BP: 139/51 (27 Dec 2023 12:00) (113/43 - 150/49)  BP(mean): 71 (27 Dec 2023 12:00) (58 - 106)  RR: 18 (27 Dec 2023 12:00) (17 - 20)  SpO2: 98% (27 Dec 2023 12:00) (94% - 100%)    Parameters below as of 27 Dec 2023 12:00  Patient On (Oxygen Delivery Method): nasal cannula  O2 Flow (L/min): 2    PHYSICAL EXAM:  General: No apparent distress, obese  Neck: Supple, trachea midline, no thyromegaly  Respiratory: Lungs clear bilaterally, normal rate, effort  Cardiac: +S1, S2, no m/r/g  GI: +BS, soft, non tender, non distended  Extremities: LE redness, dryness   Neuro: A+O X3, no tremor    LABS:                        14.0   30.67 )-----------( 261      ( 27 Dec 2023 04:39 )             43.1     12-27    136  |  92<L>  |  74.2<H>  ----------------------------<  303<H>  4.8   |  30.0<H>  |  2.24<H>    Ca    9.0      27 Dec 2023 04:39  Mg     2.6     12-27      Urinalysis Basic - ( 27 Dec 2023 04:39 )    Color: x / Appearance: x / SG: x / pH: x  Gluc: 303 mg/dL / Ketone: x  / Bili: x / Urobili: x   Blood: x / Protein: x / Nitrite: x   Leuk Esterase: x / RBC: x / WBC x   Sq Epi: x / Non Sq Epi: x / Bacteria: x    POCT Blood Glucose.: 378 mg/dL (12-27-23 @ 11:14)  POCT Blood Glucose.: 283 mg/dL (12-27-23 @ 08:26)  POCT Blood Glucose.: 270 mg/dL (12-26-23 @ 22:00)  POCT Blood Glucose.: 238 mg/dL (12-26-23 @ 17:14)    Thyroid Stimulating Hormone Serum: 0.34 uIU/mL (12-22-23 @ 04:51)  Thyroid Stimulating Hormone, Serum: 1.51 uIU/mL (12-16-23 @ 15:20)  Thyroid Stimulating Hormone, Serum: 3.02 uIU/mL (12-16-23 @ 10:18)   INTERVAL EVENTS:  Follow up diabetes management  Steroid induced hyperglycemia    ROS: Denies chest pain, sob. Complains of left sided pain near ribs.    MEDICATIONS  (STANDING):  atorvastatin 80 milliGRAM(s) Oral at bedtime  budesonide  80 MICROgram(s)/formoterol 4.5 MICROgram(s) Inhaler 2 Puff(s) Inhalation two times a day  clotrimazole 1% Cream 1 Application(s) Topical two times a day  dextrose 5%. 1000 milliLiter(s) (50 mL/Hr) IV Continuous <Continuous>  dextrose 5%. 1000 milliLiter(s) (100 mL/Hr) IV Continuous <Continuous>  dextrose 50% Injectable 25 Gram(s) IV Push once  dextrose 50% Injectable 12.5 Gram(s) IV Push once  dextrose 50% Injectable 25 Gram(s) IV Push once  famotidine    Tablet 20 milliGRAM(s) Oral daily  glucagon  Injectable 1 milliGRAM(s) IntraMuscular once  heparin  Infusion.  Unit(s)/Hr (24 mL/Hr) IV Continuous <Continuous>  insulin glargine Injectable (LANTUS) 50 Unit(s) SubCutaneous at bedtime  insulin glargine Injectable (LANTUS) 50 Unit(s) SubCutaneous every morning  insulin lispro (ADMELOG) corrective regimen sliding scale   SubCutaneous three times a day before meals  insulin lispro Injectable (ADMELOG) 40 Unit(s) SubCutaneous three times a day before meals  ipratropium    for Nebulization 500 MICROGram(s) Nebulizer every 6 hours  lactulose Syrup 20 Gram(s) Oral daily  levalbuterol Inhalation 0.63 milliGRAM(s) Inhalation every 6 hours  meropenem Injectable 1000 milliGRAM(s) IV Push every 8 hours  metoprolol tartrate 50 milliGRAM(s) Oral every 8 hours  nystatin Powder 1 Application(s) Topical two times a day  oxyCODONE  ER Tablet 60 milliGRAM(s) Oral every 12 hours  polyethylene glycol 3350 17 Gram(s) Oral daily  senna 2 Tablet(s) Oral at bedtime  vancomycin  IVPB 1500 milliGRAM(s) IV Intermittent every 24 hours    MEDICATIONS  (PRN):  acetaminophen     Tablet .. 650 milliGRAM(s) Oral every 6 hours PRN Temp greater or equal to 38C (100.4F), Mild Pain (1 - 3)  albuterol    90 MICROgram(s) HFA Inhaler 2 Puff(s) Inhalation every 2 hours PRN Shortness of Breath and/or Wheezing  ALPRAZolam 0.25 milliGRAM(s) Oral every 8 hours PRN for anxiety/Sleep  dextrose Oral Gel 15 Gram(s) Oral once PRN Blood Glucose LESS THAN 70 milliGRAM(s)/deciliter  diphenhydrAMINE 25 milliGRAM(s) Oral every 6 hours PRN Rash and/or Itching  heparin   Injectable 5000 Unit(s) IV Push every 6 hours PRN For aPTT between 40 - 57  heparin   Injectable 02118 Unit(s) IV Push every 6 hours PRN For aPTT less than 40  hydrALAZINE Injectable 10 milliGRAM(s) IV Push every 4 hours PRN for systolic bp > 160  HYDROmorphone  Injectable 1 milliGRAM(s) IV Push every 4 hours PRN breakthrough  pain  melatonin 3 milliGRAM(s) Oral at bedtime PRN Insomnia  metoprolol tartrate Injectable 5 milliGRAM(s) IV Push every 6 hours PRN heart rate sustaining greater than 130  ondansetron Injectable 4 milliGRAM(s) IV Push every 8 hours PRN Nausea and/or Vomiting  oxyCODONE    IR 5 milliGRAM(s) Oral every 6 hours PRN Moderate Pain (4 - 6)    Allergies  wool- rash, itch (Other)  adhesives (Rash)  latex (Rash)  Bactrim (Flushing)    Vital Signs Last 24 Hrs  T(C): 37.2 (27 Dec 2023 12:00), Max: 37.3 (26 Dec 2023 16:00)  T(F): 99 (27 Dec 2023 12:00), Max: 99.1 (26 Dec 2023 16:00)  HR: 100 (27 Dec 2023 12:00) (88 - 119)  BP: 139/51 (27 Dec 2023 12:00) (113/43 - 150/49)  BP(mean): 71 (27 Dec 2023 12:00) (58 - 106)  RR: 18 (27 Dec 2023 12:00) (17 - 20)  SpO2: 98% (27 Dec 2023 12:00) (94% - 100%)    Parameters below as of 27 Dec 2023 12:00  Patient On (Oxygen Delivery Method): nasal cannula  O2 Flow (L/min): 2    PHYSICAL EXAM:  General: No apparent distress, obese  Neck: Supple, trachea midline, no thyromegaly  Respiratory: Lungs clear bilaterally, normal rate, effort  Cardiac: +S1, S2, no m/r/g  GI: +BS, soft, non tender, non distended  Extremities: LE redness, dryness   Neuro: A+O X3, no tremor    LABS:                        14.0   30.67 )-----------( 261      ( 27 Dec 2023 04:39 )             43.1     12-27    136  |  92<L>  |  74.2<H>  ----------------------------<  303<H>  4.8   |  30.0<H>  |  2.24<H>    Ca    9.0      27 Dec 2023 04:39  Mg     2.6     12-27      Urinalysis Basic - ( 27 Dec 2023 04:39 )    Color: x / Appearance: x / SG: x / pH: x  Gluc: 303 mg/dL / Ketone: x  / Bili: x / Urobili: x   Blood: x / Protein: x / Nitrite: x   Leuk Esterase: x / RBC: x / WBC x   Sq Epi: x / Non Sq Epi: x / Bacteria: x    POCT Blood Glucose.: 378 mg/dL (12-27-23 @ 11:14)  POCT Blood Glucose.: 283 mg/dL (12-27-23 @ 08:26)  POCT Blood Glucose.: 270 mg/dL (12-26-23 @ 22:00)  POCT Blood Glucose.: 238 mg/dL (12-26-23 @ 17:14)    Thyroid Stimulating Hormone Serum: 0.34 uIU/mL (12-22-23 @ 04:51)  Thyroid Stimulating Hormone, Serum: 1.51 uIU/mL (12-16-23 @ 15:20)  Thyroid Stimulating Hormone, Serum: 3.02 uIU/mL (12-16-23 @ 10:18)

## 2023-12-27 NOTE — PROGRESS NOTE ADULT - NUTRITIONAL ASSESSMENT
Patient on room air.    This patient has been assessed with a concern for Malnutrition and has been determined to have a diagnosis/diagnoses of Morbid obesity (BMI > 40).    This patient is being managed with:   Diet Consistent Carbohydrate w/Evening Snack-  1500mL Fluid Restriction ([id number])  Low Sodium  Entered: Dec 19 2023  6:23PM   This patient has been assessed with a concern for Malnutrition and has been determined to have a diagnosis/diagnoses of Morbid obesity (BMI > 40).    This patient is being managed with:   Diet Consistent Carbohydrate w/Evening Snack-  1500mL Fluid Restriction ([id number])  Low Sodium  Entered: Dec 19 2023  6:23PM

## 2023-12-27 NOTE — PROGRESS NOTE ADULT - ASSESSMENT
69 yo female with HTN, HLD, DM2, HFpEF, CKD III, DVT, Uterine CA, COPD on home o2 who presented with complaints of shortness of breath. Patient reports that she has been sick all week and was recently started on Vantin and steroids while at the nursing home. Patient reports that her dyspnea just worsened and she told the nursing home to send her in for an evaluation. While in the ED, patient was placed on BIPAP and was given IV lasix with some improvement. RVP then results as RSV +. Patient had a CT scan which also showed pneumonia and pulmonary HTN. Admission to medicine was requested for further management.    #new afib:  echo w/ preserved EF  - EP consulted, no DCCV given resp issues  - cardiology following, plan for R/LHC when resp condition improves  - HR better controlled today  - c/w loprssor 50mg q8hr  - c/w heparin drip for [id number] 6 until LH with cardio  - spoke with Dr. Alamo of cardiology, if renal fxn improving tomorrow pt will go for LHC    # Acute on chronic respiratory failure  # Acute HFpEF exacerbation with likely with Superimposed bacterial Pneumonia in setting of RSV  # Worsening PNA  - WBC still trending up  - CT chest with worsening middle lobe PNA  - pt able to weaned off HFNC today  - sputum clx ordered for last several days, pt unable to bring up sputum  - repeat MRSA PCR negative  - zosyn changed to meropenem and vanc on 12/26  - repeat blood clx in process  - tapering IV steroids to PO today  - levalbuterol and ipratropium q6hrs, symbicort BID   - IV Lasix on hold for JACQUES  - BIPAP qhs  - pulm following     # JACQUES  - likely combination of pre-renal and ATN  - lasix on hold  - nephrology consulted  - trend BMP    #Chronic pain  - continue home Pain meds of Oxycodone 60 mg q12h and Oxycodone 5mg q6h PRN    # HLD  - Atorvastatin for rosuvastatin    # COPD  - Steroids as above  - Symbicort, Spiriva, albuterol  - pulm following     #DM  #steroid induced hyperglycemia  - lantus/premeal insulin per endo  - lispro sliding scale    DVT prophylaxis: heparin ggt  Dispo: pending resp status optimization     69 yo female with HTN, HLD, DM2, HFpEF, CKD III, DVT, Uterine CA, COPD on home o2 who presented with complaints of shortness of breath. Patient reports that she has been sick all week and was recently started on Vantin and steroids while at the nursing home. Patient reports that her dyspnea just worsened and she told the nursing home to send her in for an evaluation. While in the ED, patient was placed on BIPAP and was given IV lasix with some improvement. RVP then results as RSV +. Patient had a CT scan which also showed pneumonia and pulmonary HTN. Admission to medicine was requested for further management.    #new afib:  echo w/ preserved EF  - EP consulted, no DCCV given resp issues  - cardiology following, plan for R/LHC when resp condition improves  - HR better controlled today  - c/w loprssor 50mg q8hr  - c/w heparin drip for [id number] 6 until LH with cardio  - spoke with Dr. Alamo of cardiology, if renal fxn improving tomorrow pt will go for LHC    # Acute on chronic respiratory failure  # Acute HFpEF exacerbation with likely with Superimposed bacterial Pneumonia in setting of RSV  # Worsening PNA  - WBC still trending up  - CT chest with worsening middle lobe PNA  - pt able to weaned off HFNC today  - sputum clx ordered for last several days, pt unable to bring up sputum  - repeat MRSA PCR negative  - zosyn changed to meropenem and vanc on 12/26  - repeat blood clx in process  - tapering IV steroids to PO today  - levalbuterol and ipratropium q6hrs, symbicort BID   - IV Lasix on hold for JACQUES  - BIPAP qhs  - pulm following     # JACQUES  - likely combination of pre-renal and ATN  - lasix on hold  - nephrology consulted  - trend BMP    #Chronic pain  - continue home Pain meds of Oxycodone 60 mg q12h and Oxycodone 5mg q6h PRN    # HLD  - Atorvastatin for rosuvastatin    # COPD  - Steroids as above  - Symbicort, Spiriva, albuterol  - pulm following     #DM  #steroid induced hyperglycemia  - lantus/premeal insulin per endo  - lispro sliding scale    DVT prophylaxis: heparin ggt  Dispo: pending resp status optimization

## 2023-12-27 NOTE — PROGRESS NOTE ADULT - SUBJECTIVE AND OBJECTIVE BOX
[facility] [place] [facility] Division of Medicine    SUBJECTIVE / OVERNIGHT EVENTS: Pt seen at the bedside. Reports feeling better than yesterday. Patient denies chest pain, SOB, abd pain, N/V, fever, chills, dysuria or any other complaints. All remainder ROS negative.     MEDICATIONS  (STANDING):  atorvastatin 80 milliGRAM(s) Oral at bedtime  budesonide  80 MICROgram(s)/formoterol 4.5 MICROgram(s) Inhaler 2 Puff(s) Inhalation two times a day  clotrimazole 1% Cream 1 Application(s) Topical two times a day  dextrose 5%. 1000 milliLiter(s) (100 mL/Hr) IV Continuous <Continuous>  dextrose 5%. 1000 milliLiter(s) (50 mL/Hr) IV Continuous <Continuous>  dextrose 50% Injectable 12.5 Gram(s) IV Push once  dextrose 50% Injectable 25 Gram(s) IV Push once  dextrose 50% Injectable 25 Gram(s) IV Push once  famotidine    Tablet 20 milliGRAM(s) Oral daily  glucagon  Injectable 1 milliGRAM(s) IntraMuscular once  heparin  Infusion.  Unit(s)/Hr (24 mL/Hr) IV Continuous <Continuous>  insulin glargine Injectable (LANTUS) 50 Unit(s) SubCutaneous at bedtime  insulin glargine Injectable (LANTUS) 50 Unit(s) SubCutaneous every morning  insulin lispro (ADMELOG) corrective regimen sliding scale   SubCutaneous three times a day before meals  insulin lispro Injectable (ADMELOG) 40 Unit(s) SubCutaneous three times a day before meals  ipratropium    for Nebulization 500 MICROGram(s) Nebulizer every 6 hours  lactulose Syrup 20 Gram(s) Oral daily  levalbuterol Inhalation 0.63 milliGRAM(s) Inhalation every 6 hours  meropenem Injectable 1000 milliGRAM(s) IV Push every 8 hours  metoprolol tartrate 50 milliGRAM(s) Oral every 8 hours  nystatin Powder 1 Application(s) Topical two times a day  oxyCODONE  ER Tablet 60 milliGRAM(s) Oral every 12 hours  polyethylene glycol 3350 17 Gram(s) Oral daily  senna 2 Tablet(s) Oral at bedtime  vancomycin  IVPB 1500 milliGRAM(s) IV Intermittent every 24 hours    MEDICATIONS  (PRN):  acetaminophen     Tablet .. 650 milliGRAM(s) Oral every 6 hours PRN Temp greater or equal to 38C (100.4F), Mild Pain (1 - 3)  albuterol    90 MICROgram(s) HFA Inhaler 2 Puff(s) Inhalation every 2 hours PRN Shortness of Breath and/or Wheezing  ALPRAZolam 0.25 milliGRAM(s) Oral every 8 hours PRN for anxiety/Sleep  dextrose Oral Gel 15 Gram(s) Oral once PRN Blood Glucose LESS THAN 70 milliGRAM(s)/deciliter  diphenhydrAMINE 25 milliGRAM(s) Oral every 6 hours PRN Rash and/or Itching  heparin   Injectable 5000 Unit(s) IV Push every 6 hours PRN For aPTT between 40 - 57  heparin   Injectable 25230 Unit(s) IV Push every 6 hours PRN For aPTT less than 40  hydrALAZINE Injectable 10 milliGRAM(s) IV Push every 4 hours PRN for systolic bp > 160  HYDROmorphone  Injectable 1 milliGRAM(s) IV Push every 4 hours PRN breakthrough  pain  melatonin 3 milliGRAM(s) Oral at bedtime PRN Insomnia  metoprolol tartrate Injectable 5 milliGRAM(s) IV Push every 6 hours PRN heart rate sustaining greater than 130  ondansetron Injectable 4 milliGRAM(s) IV Push every 8 hours PRN Nausea and/or Vomiting  oxyCODONE    IR 5 milliGRAM(s) Oral every 6 hours PRN Moderate Pain (4 - 6)      I&O's Summary    26 Dec 2023 07:01  -  27 Dec 2023 07:00  --------------------------------------------------------  IN: 204 mL / OUT: 3600 mL / NET: -3396 mL    27 Dec 2023 07:01  -  27 Dec 2023 15:05  --------------------------------------------------------  IN: 730 mL / OUT: 800 mL / NET: -70 mL        PHYSICAL EXAM:  Vital Signs Last 24 Hrs  T(C): 37.2 (27 Dec 2023 12:00), Max: 37.3 (26 Dec 2023 16:00)  T(F): 99 (27 Dec 2023 12:00), Max: 99.1 (26 Dec 2023 16:00)  HR: 100 (27 Dec 2023 13:00) (88 - 119)  BP: 131/55 (27 Dec 2023 13:00) (113/43 - 150/49)  BP(mean): 72 (27 Dec 2023 13:00) (58 - 106)  RR: 17 (27 Dec 2023 13:00) (17 - 20)  SpO2: 95% (27 Dec 2023 13:00) (94% - 100%)    Parameters below as of 27 Dec 2023 13:00  Patient On (Oxygen Delivery Method): nasal cannula  O2 Flow (L/min): 2      GENERAL: not in acute distress, morbidly obese   HEENT:  Clear conjunctiva, PERRL,  EOMI, moist oral mucosa no pharyngeal injection or exudates, no cervical LAD  RESP:  Non-labored breathing pattern, course lungs sounds on the right , no wheezes or crackles appreciated  CV: Regular rate and rhythm, no murmurs appreciated, +1 lower extremity edema, peripheral pulses are 2+ bilaterally  GI: Soft, non-tender, non-distended  NEURO: Awake, alert, conversant, upper and lower extremity strength and light touch sensation grossly intact  PSYCH: Calm, cooperative, A&Ox3  SKIN: No rash or lesions, warm and         LABS:                        14.0   30.67 )-----------( 261      ( 27 Dec 2023 04:39 )             43.1     12-27    136  |  92<L>  |  74.2<H>  ----------------------------<  303<H>  4.8   |  30.0<H>  |  2.24<H>    Ca    9.0      27 Dec 2023 04:39  Mg     2.6     12-27      PTT - ( 27 Dec 2023 02:59 )  PTT:60.0 sec      Urinalysis Basic - ( 27 Dec 2023 04:39 )    Color: x / Appearance: x / SG: x / pH: x  Gluc: 303 mg/dL / Ketone: x  / Bili: x / Urobili: x   Blood: x / Protein: x / Nitrite: x   Leuk Esterase: x / RBC: x / WBC x   Sq Epi: x / Non Sq Epi: x / Bacteria: x        CAPILLARY BLOOD GLUCOSE      POCT Blood Glucose.: 378 mg/dL (27 Dec 2023 11:14)  POCT Blood Glucose.: 283 mg/dL (27 Dec 2023 08:26)  POCT Blood Glucose.: 270 mg/dL (26 Dec 2023 22:00)  POCT Blood Glucose.: 238 mg/dL (26 Dec 2023 17:14)      IMAGING:                                   [facility] Division of Medicine    SUBJECTIVE / OVERNIGHT EVENTS: Pt seen at the bedside. Reports feeling better than yesterday. Patient denies chest pain, SOB, abd pain, N/V, fever, chills, dysuria or any other complaints. All remainder ROS negative.     MEDICATIONS  (STANDING):  atorvastatin 80 milliGRAM(s) Oral at bedtime  budesonide  80 MICROgram(s)/formoterol 4.5 MICROgram(s) Inhaler 2 Puff(s) Inhalation two times a day  clotrimazole 1% Cream 1 Application(s) Topical two times a day  dextrose 5%. 1000 milliLiter(s) (100 mL/Hr) IV Continuous <Continuous>  dextrose 5%. 1000 milliLiter(s) (50 mL/Hr) IV Continuous <Continuous>  dextrose 50% Injectable 12.5 Gram(s) IV Push once  dextrose 50% Injectable 25 Gram(s) IV Push once  dextrose 50% Injectable 25 Gram(s) IV Push once  famotidine    Tablet 20 milliGRAM(s) Oral daily  glucagon  Injectable 1 milliGRAM(s) IntraMuscular once  heparin  Infusion.  Unit(s)/Hr (24 mL/Hr) IV Continuous <Continuous>  insulin glargine Injectable (LANTUS) 50 Unit(s) SubCutaneous at bedtime  insulin glargine Injectable (LANTUS) 50 Unit(s) SubCutaneous every morning  insulin lispro (ADMELOG) corrective regimen sliding scale   SubCutaneous three times a day before meals  insulin lispro Injectable (ADMELOG) 40 Unit(s) SubCutaneous three times a day before meals  ipratropium    for Nebulization 500 MICROGram(s) Nebulizer every 6 hours  lactulose Syrup 20 Gram(s) Oral daily  levalbuterol Inhalation 0.63 milliGRAM(s) Inhalation every 6 hours  meropenem Injectable 1000 milliGRAM(s) IV Push every 8 hours  metoprolol tartrate 50 milliGRAM(s) Oral every 8 hours  nystatin Powder 1 Application(s) Topical two times a day  oxyCODONE  ER Tablet 60 milliGRAM(s) Oral every 12 hours  polyethylene glycol 3350 17 Gram(s) Oral daily  senna 2 Tablet(s) Oral at bedtime  vancomycin  IVPB 1500 milliGRAM(s) IV Intermittent every 24 hours    MEDICATIONS  (PRN):  acetaminophen     Tablet .. 650 milliGRAM(s) Oral every 6 hours PRN Temp greater or equal to 38C (100.4F), Mild Pain (1 - 3)  albuterol    90 MICROgram(s) HFA Inhaler 2 Puff(s) Inhalation every 2 hours PRN Shortness of Breath and/or Wheezing  ALPRAZolam 0.25 milliGRAM(s) Oral every 8 hours PRN for anxiety/Sleep  dextrose Oral Gel 15 Gram(s) Oral once PRN Blood Glucose LESS THAN 70 milliGRAM(s)/deciliter  diphenhydrAMINE 25 milliGRAM(s) Oral every 6 hours PRN Rash and/or Itching  heparin   Injectable 5000 Unit(s) IV Push every 6 hours PRN For aPTT between 40 - 57  heparin   Injectable 95187 Unit(s) IV Push every 6 hours PRN For aPTT less than 40  hydrALAZINE Injectable 10 milliGRAM(s) IV Push every 4 hours PRN for systolic bp > 160  HYDROmorphone  Injectable 1 milliGRAM(s) IV Push every 4 hours PRN breakthrough  pain  melatonin 3 milliGRAM(s) Oral at bedtime PRN Insomnia  metoprolol tartrate Injectable 5 milliGRAM(s) IV Push every 6 hours PRN heart rate sustaining greater than 130  ondansetron Injectable 4 milliGRAM(s) IV Push every 8 hours PRN Nausea and/or Vomiting  oxyCODONE    IR 5 milliGRAM(s) Oral every 6 hours PRN Moderate Pain (4 - 6)      I&O's Summary    26 Dec 2023 07:01  -  27 Dec 2023 07:00  --------------------------------------------------------  IN: 204 mL / OUT: 3600 mL / NET: -3396 mL    27 Dec 2023 07:01  -  27 Dec 2023 15:05  --------------------------------------------------------  IN: 730 mL / OUT: 800 mL / NET: -70 mL        PHYSICAL EXAM:  Vital Signs Last 24 Hrs  T(C): 37.2 (27 Dec 2023 12:00), Max: 37.3 (26 Dec 2023 16:00)  T(F): 99 (27 Dec 2023 12:00), Max: 99.1 (26 Dec 2023 16:00)  HR: 100 (27 Dec 2023 13:00) (88 - 119)  BP: 131/55 (27 Dec 2023 13:00) (113/43 - 150/49)  BP(mean): 72 (27 Dec 2023 13:00) (58 - 106)  RR: 17 (27 Dec 2023 13:00) (17 - 20)  SpO2: 95% (27 Dec 2023 13:00) (94% - 100%)    Parameters below as of 27 Dec 2023 13:00  Patient On (Oxygen Delivery Method): nasal cannula  O2 Flow (L/min): 2      GENERAL: not in acute distress, morbidly obese   HEENT:  Clear conjunctiva, PERRL,  EOMI, moist oral mucosa no pharyngeal injection or exudates, no cervical LAD  RESP:  Non-labored breathing pattern, course lungs sounds on the right , no wheezes or crackles appreciated  CV: Regular rate and rhythm, no murmurs appreciated, +1 lower extremity edema, peripheral pulses are 2+ bilaterally  GI: Soft, non-tender, non-distended  NEURO: Awake, alert, conversant, upper and lower extremity strength and light touch sensation grossly intact  PSYCH: Calm, cooperative, A&Ox3  SKIN: No rash or lesions, warm and         LABS:                        14.0   30.67 )-----------( 261      ( 27 Dec 2023 04:39 )             43.1     12-27    136  |  92<L>  |  74.2<H>  ----------------------------<  303<H>  4.8   |  30.0<H>  |  2.24<H>    Ca    9.0      27 Dec 2023 04:39  Mg     2.6     12-27      PTT - ( 27 Dec 2023 02:59 )  PTT:60.0 sec      Urinalysis Basic - ( 27 Dec 2023 04:39 )    Color: x / Appearance: x / SG: x / pH: x  Gluc: 303 mg/dL / Ketone: x  / Bili: x / Urobili: x   Blood: x / Protein: x / Nitrite: x   Leuk Esterase: x / RBC: x / WBC x   Sq Epi: x / Non Sq Epi: x / Bacteria: x        CAPILLARY BLOOD GLUCOSE      POCT Blood Glucose.: 378 mg/dL (27 Dec 2023 11:14)  POCT Blood Glucose.: 283 mg/dL (27 Dec 2023 08:26)  POCT Blood Glucose.: 270 mg/dL (26 Dec 2023 22:00)  POCT Blood Glucose.: 238 mg/dL (26 Dec 2023 17:14)      IMAGING:

## 2023-12-27 NOTE — PROGRESS NOTE ADULT - NS ATTEND AMEND GEN_ALL_CORE FT
69 yo female with HTN, HLD, DM2, HFpEF, CKD III, DVT, Uterine CA, COPD on home o2 w admitted with acute on chronic resp failure and acute HFpEF exacerbation, superimposed bacterial Pneumonia in setting of RSV and new onset AFib and worsening GFR. Remains hyperglycemic worsened by steroids. Insulin dosing as above, will follow.

## 2023-12-27 NOTE — PROGRESS NOTE ADULT - ASSESSMENT
69 yo female with HTN, HLD, DM2, HFpEF, CKD III, DVT, Uterine CA, COPD on home o2 who presented with complaints of shortness of breath. Admitted with acute on chronic resp failure and acute HFpEF exacerbation with likely with Superimposed bacterial Pneumonia in setting of RSV and new onset AFib and worsening GFR.    1. T2DM complicated by CKD - steroid induced hyperglycemia  - Increase premeal admelog to 40 units TID with meals  - Continue lantus 50 units BID  - On high dose admleog sliding scale with meals  - On steroid taper, changed from IV to PO    2. Acute on chronic respiratory failure and Acute HFpEF exacerbation with likely with Superimposed bacterial Pneumonia in setting of RSV  - IV Abx  - Steroids taper as per primary team  - HFNC    3. HLD  - Continue statin   67 yo female with HTN, HLD, DM2, HFpEF, CKD III, DVT, Uterine CA, COPD on home o2 who presented with complaints of shortness of breath. Admitted with acute on chronic resp failure and acute HFpEF exacerbation with likely with Superimposed bacterial Pneumonia in setting of RSV and new onset AFib and worsening GFR.    1. T2DM complicated by CKD - steroid induced hyperglycemia  - Increase premeal admelog to 40 units TID with meals  - Continue lantus 50 units BID  - On high dose admleog sliding scale with meals  - On steroid taper, changed from IV to PO    2. Acute on chronic respiratory failure and Acute HFpEF exacerbation with likely with Superimposed bacterial Pneumonia in setting of RSV  - IV Abx  - Steroids taper as per primary team  - HFNC    3. HLD  - Continue statin   67 yo female with HTN, HLD, DM2, HFpEF, CKD III, DVT, Uterine CA, COPD on home o2 who presented with complaints of shortness of breath. Admitted with acute on chronic resp failure and acute HFpEF exacerbation with likely with Superimposed bacterial Pneumonia in setting of RSV and new onset AFib and worsening GFR.    1. T2DM complicated by CKD - steroid induced hyperglycemia  - Increase premeal admelog to 40 units TID with meals  - Continue lantus 50 units BID  - On high dose admleog sliding scale with meals  - On steroid taper, changed from IV to PO, will adjust insulin doses as glycemic control improved on lower steroids doses    2. Acute on chronic respiratory failure and Acute HFpEF exacerbation with likely with Superimposed bacterial Pneumonia in setting of RSV  - IV Abx  - Steroids taper as per primary team  - HFNC    3. HLD  - Continue statin   69 yo female with HTN, HLD, DM2, HFpEF, CKD III, DVT, Uterine CA, COPD on home o2 who presented with complaints of shortness of breath. Admitted with acute on chronic resp failure and acute HFpEF exacerbation with likely with Superimposed bacterial Pneumonia in setting of RSV and new onset AFib and worsening GFR.    1. T2DM complicated by CKD - steroid induced hyperglycemia  - Increase premeal admelog to 40 units TID with meals  - Continue lantus 50 units BID  - On high dose admleog sliding scale with meals  - On steroid taper, changed from IV to PO, will adjust insulin doses as glycemic control improved on lower steroids doses    2. Acute on chronic respiratory failure and Acute HFpEF exacerbation with likely with Superimposed bacterial Pneumonia in setting of RSV  - IV Abx  - Steroids taper as per primary team  - HFNC    3. HLD  - Continue statin

## 2023-12-28 LAB
ANION GAP SERPL CALC-SCNC: 10 MMOL/L — SIGNIFICANT CHANGE UP (ref 5–17)
APTT BLD: 58.8 SEC — HIGH (ref 24.5–35.6)
APTT BLD: 58.8 SEC — HIGH (ref 24.5–35.6)
BASOPHILS # BLD AUTO: 0.19 K/UL — SIGNIFICANT CHANGE UP (ref 0–0.2)
BASOPHILS # BLD AUTO: 0.19 K/UL — SIGNIFICANT CHANGE UP (ref 0–0.2)
BASOPHILS NFR BLD AUTO: 0.6 % — SIGNIFICANT CHANGE UP (ref 0–2)
BASOPHILS NFR BLD AUTO: 0.6 % — SIGNIFICANT CHANGE UP (ref 0–2)
BUN SERPL-MCNC: 71.5 MG/DL — HIGH (ref 8–20)
BUN SERPL-MCNC: 71.5 MG/DL — HIGH (ref 8–20)
BUN SERPL-MCNC: 75.1 MG/DL — HIGH (ref 8–20)
BUN SERPL-MCNC: 75.1 MG/DL — HIGH (ref 8–20)
CALCIUM SERPL-MCNC: 9.2 MG/DL — SIGNIFICANT CHANGE UP (ref 8.4–10.5)
CHLORIDE SERPL-SCNC: 92 MMOL/L — LOW (ref 96–108)
CHLORIDE SERPL-SCNC: 92 MMOL/L — LOW (ref 96–108)
CHLORIDE SERPL-SCNC: 94 MMOL/L — LOW (ref 96–108)
CHLORIDE SERPL-SCNC: 94 MMOL/L — LOW (ref 96–108)
CO2 SERPL-SCNC: 30 MMOL/L — HIGH (ref 22–29)
CO2 SERPL-SCNC: 30 MMOL/L — HIGH (ref 22–29)
CO2 SERPL-SCNC: 31 MMOL/L — HIGH (ref 22–29)
CO2 SERPL-SCNC: 31 MMOL/L — HIGH (ref 22–29)
CREAT SERPL-MCNC: 1.57 MG/DL — HIGH (ref 0.5–1.3)
CREAT SERPL-MCNC: 1.57 MG/DL — HIGH (ref 0.5–1.3)
CREAT SERPL-MCNC: 1.66 MG/DL — HIGH (ref 0.5–1.3)
CREAT SERPL-MCNC: 1.66 MG/DL — HIGH (ref 0.5–1.3)
EGFR: 33 ML/MIN/1.73M2 — LOW
EGFR: 33 ML/MIN/1.73M2 — LOW
EGFR: 35 ML/MIN/1.73M2 — LOW
EGFR: 35 ML/MIN/1.73M2 — LOW
EOSINOPHIL # BLD AUTO: 0.01 K/UL — SIGNIFICANT CHANGE UP (ref 0–0.5)
EOSINOPHIL # BLD AUTO: 0.01 K/UL — SIGNIFICANT CHANGE UP (ref 0–0.5)
EOSINOPHIL NFR BLD AUTO: 0 % — SIGNIFICANT CHANGE UP (ref 0–6)
EOSINOPHIL NFR BLD AUTO: 0 % — SIGNIFICANT CHANGE UP (ref 0–6)
GLUCOSE BLDC GLUCOMTR-MCNC: 162 MG/DL — HIGH (ref 70–99)
GLUCOSE BLDC GLUCOMTR-MCNC: 162 MG/DL — HIGH (ref 70–99)
GLUCOSE BLDC GLUCOMTR-MCNC: 211 MG/DL — HIGH (ref 70–99)
GLUCOSE BLDC GLUCOMTR-MCNC: 211 MG/DL — HIGH (ref 70–99)
GLUCOSE BLDC GLUCOMTR-MCNC: 219 MG/DL — HIGH (ref 70–99)
GLUCOSE BLDC GLUCOMTR-MCNC: 219 MG/DL — HIGH (ref 70–99)
GLUCOSE BLDC GLUCOMTR-MCNC: 277 MG/DL — HIGH (ref 70–99)
GLUCOSE BLDC GLUCOMTR-MCNC: 277 MG/DL — HIGH (ref 70–99)
GLUCOSE BLDC GLUCOMTR-MCNC: >600 MG/DL — CRITICAL HIGH (ref 70–99)
GLUCOSE BLDC GLUCOMTR-MCNC: >600 MG/DL — CRITICAL HIGH (ref 70–99)
GLUCOSE SERPL-MCNC: 199 MG/DL — HIGH (ref 70–99)
GLUCOSE SERPL-MCNC: 199 MG/DL — HIGH (ref 70–99)
GLUCOSE SERPL-MCNC: 219 MG/DL — HIGH (ref 70–99)
GLUCOSE SERPL-MCNC: 219 MG/DL — HIGH (ref 70–99)
HCT VFR BLD CALC: 41.7 % — SIGNIFICANT CHANGE UP (ref 34.5–45)
HCT VFR BLD CALC: 41.7 % — SIGNIFICANT CHANGE UP (ref 34.5–45)
HGB BLD-MCNC: 13.5 G/DL — SIGNIFICANT CHANGE UP (ref 11.5–15.5)
HGB BLD-MCNC: 13.5 G/DL — SIGNIFICANT CHANGE UP (ref 11.5–15.5)
IMM GRANULOCYTES NFR BLD AUTO: 4.9 % — HIGH (ref 0–0.9)
IMM GRANULOCYTES NFR BLD AUTO: 4.9 % — HIGH (ref 0–0.9)
LYMPHOCYTES # BLD AUTO: 2.25 K/UL — SIGNIFICANT CHANGE UP (ref 1–3.3)
LYMPHOCYTES # BLD AUTO: 2.25 K/UL — SIGNIFICANT CHANGE UP (ref 1–3.3)
LYMPHOCYTES # BLD AUTO: 7.2 % — LOW (ref 13–44)
LYMPHOCYTES # BLD AUTO: 7.2 % — LOW (ref 13–44)
MCHC RBC-ENTMCNC: 28.8 PG — SIGNIFICANT CHANGE UP (ref 27–34)
MCHC RBC-ENTMCNC: 28.8 PG — SIGNIFICANT CHANGE UP (ref 27–34)
MCHC RBC-ENTMCNC: 32.4 GM/DL — SIGNIFICANT CHANGE UP (ref 32–36)
MCHC RBC-ENTMCNC: 32.4 GM/DL — SIGNIFICANT CHANGE UP (ref 32–36)
MCV RBC AUTO: 88.9 FL — SIGNIFICANT CHANGE UP (ref 80–100)
MCV RBC AUTO: 88.9 FL — SIGNIFICANT CHANGE UP (ref 80–100)
MONOCYTES # BLD AUTO: 2.91 K/UL — HIGH (ref 0–0.9)
MONOCYTES # BLD AUTO: 2.91 K/UL — HIGH (ref 0–0.9)
MONOCYTES NFR BLD AUTO: 9.3 % — SIGNIFICANT CHANGE UP (ref 2–14)
MONOCYTES NFR BLD AUTO: 9.3 % — SIGNIFICANT CHANGE UP (ref 2–14)
MRSA PCR RESULT.: SIGNIFICANT CHANGE UP
MRSA PCR RESULT.: SIGNIFICANT CHANGE UP
NEUTROPHILS # BLD AUTO: 24.53 K/UL — HIGH (ref 1.8–7.4)
NEUTROPHILS # BLD AUTO: 24.53 K/UL — HIGH (ref 1.8–7.4)
NEUTROPHILS NFR BLD AUTO: 78 % — HIGH (ref 43–77)
NEUTROPHILS NFR BLD AUTO: 78 % — HIGH (ref 43–77)
PLATELET # BLD AUTO: 259 K/UL — SIGNIFICANT CHANGE UP (ref 150–400)
PLATELET # BLD AUTO: 259 K/UL — SIGNIFICANT CHANGE UP (ref 150–400)
POTASSIUM SERPL-MCNC: 4.9 MMOL/L — SIGNIFICANT CHANGE UP (ref 3.5–5.3)
POTASSIUM SERPL-MCNC: 4.9 MMOL/L — SIGNIFICANT CHANGE UP (ref 3.5–5.3)
POTASSIUM SERPL-MCNC: 5.6 MMOL/L — HIGH (ref 3.5–5.3)
POTASSIUM SERPL-MCNC: 5.6 MMOL/L — HIGH (ref 3.5–5.3)
POTASSIUM SERPL-SCNC: 4.9 MMOL/L — SIGNIFICANT CHANGE UP (ref 3.5–5.3)
POTASSIUM SERPL-SCNC: 4.9 MMOL/L — SIGNIFICANT CHANGE UP (ref 3.5–5.3)
POTASSIUM SERPL-SCNC: 5.6 MMOL/L — HIGH (ref 3.5–5.3)
POTASSIUM SERPL-SCNC: 5.6 MMOL/L — HIGH (ref 3.5–5.3)
RBC # BLD: 4.69 M/UL — SIGNIFICANT CHANGE UP (ref 3.8–5.2)
RBC # BLD: 4.69 M/UL — SIGNIFICANT CHANGE UP (ref 3.8–5.2)
RBC # FLD: 13.8 % — SIGNIFICANT CHANGE UP (ref 10.3–14.5)
RBC # FLD: 13.8 % — SIGNIFICANT CHANGE UP (ref 10.3–14.5)
S AUREUS DNA NOSE QL NAA+PROBE: SIGNIFICANT CHANGE UP
S AUREUS DNA NOSE QL NAA+PROBE: SIGNIFICANT CHANGE UP
SODIUM SERPL-SCNC: 133 MMOL/L — LOW (ref 135–145)
SODIUM SERPL-SCNC: 133 MMOL/L — LOW (ref 135–145)
SODIUM SERPL-SCNC: 134 MMOL/L — LOW (ref 135–145)
SODIUM SERPL-SCNC: 134 MMOL/L — LOW (ref 135–145)
VANCOMYCIN FLD-MCNC: 12.4 UG/ML — SIGNIFICANT CHANGE UP
VANCOMYCIN FLD-MCNC: 12.4 UG/ML — SIGNIFICANT CHANGE UP
WBC # BLD: 31.44 K/UL — HIGH (ref 3.8–10.5)
WBC # BLD: 31.44 K/UL — HIGH (ref 3.8–10.5)
WBC # FLD AUTO: 31.44 K/UL — HIGH (ref 3.8–10.5)
WBC # FLD AUTO: 31.44 K/UL — HIGH (ref 3.8–10.5)

## 2023-12-28 PROCEDURE — 99232 SBSQ HOSP IP/OBS MODERATE 35: CPT

## 2023-12-28 PROCEDURE — 99223 1ST HOSP IP/OBS HIGH 75: CPT

## 2023-12-28 RX ORDER — SODIUM CHLORIDE 9 MG/ML
250 INJECTION INTRAMUSCULAR; INTRAVENOUS; SUBCUTANEOUS ONCE
Refills: 0 | Status: COMPLETED | OUTPATIENT
Start: 2023-12-28 | End: 2023-12-28

## 2023-12-28 RX ORDER — CHLORHEXIDINE GLUCONATE 213 G/1000ML
1 SOLUTION TOPICAL
Refills: 0 | Status: DISCONTINUED | OUTPATIENT
Start: 2023-12-28 | End: 2024-01-08

## 2023-12-28 RX ORDER — ACETYLCYSTEINE 200 MG/ML
4 VIAL (ML) MISCELLANEOUS DAILY
Refills: 0 | Status: DISCONTINUED | OUTPATIENT
Start: 2023-12-28 | End: 2024-01-08

## 2023-12-28 RX ORDER — SODIUM ZIRCONIUM CYCLOSILICATE 10 G/10G
10 POWDER, FOR SUSPENSION ORAL ONCE
Refills: 0 | Status: COMPLETED | OUTPATIENT
Start: 2023-12-28 | End: 2023-12-28

## 2023-12-28 RX ORDER — METOPROLOL TARTRATE 50 MG
75 TABLET ORAL EVERY 8 HOURS
Refills: 0 | Status: DISCONTINUED | OUTPATIENT
Start: 2023-12-28 | End: 2023-12-30

## 2023-12-28 RX ADMIN — POLYETHYLENE GLYCOL 3350 17 GRAM(S): 17 POWDER, FOR SOLUTION ORAL at 12:50

## 2023-12-28 RX ADMIN — BUDESONIDE AND FORMOTEROL FUMARATE DIHYDRATE 2 PUFF(S): 160; 4.5 AEROSOL RESPIRATORY (INHALATION) at 08:54

## 2023-12-28 RX ADMIN — BUDESONIDE AND FORMOTEROL FUMARATE DIHYDRATE 2 PUFF(S): 160; 4.5 AEROSOL RESPIRATORY (INHALATION) at 21:25

## 2023-12-28 RX ADMIN — OXYCODONE HYDROCHLORIDE 60 MILLIGRAM(S): 5 TABLET ORAL at 17:17

## 2023-12-28 RX ADMIN — SODIUM ZIRCONIUM CYCLOSILICATE 10 GRAM(S): 10 POWDER, FOR SUSPENSION ORAL at 08:51

## 2023-12-28 RX ADMIN — OXYCODONE HYDROCHLORIDE 5 MILLIGRAM(S): 5 TABLET ORAL at 22:50

## 2023-12-28 RX ADMIN — HEPARIN SODIUM 1000 UNIT(S)/HR: 5000 INJECTION INTRAVENOUS; SUBCUTANEOUS at 19:17

## 2023-12-28 RX ADMIN — LEVALBUTEROL 0.63 MILLIGRAM(S): 1.25 SOLUTION, CONCENTRATE RESPIRATORY (INHALATION) at 15:19

## 2023-12-28 RX ADMIN — Medication 40 MILLIGRAM(S): at 06:49

## 2023-12-28 RX ADMIN — MEROPENEM 1000 MILLIGRAM(S): 1 INJECTION INTRAVENOUS at 06:49

## 2023-12-28 RX ADMIN — INSULIN GLARGINE 50 UNIT(S): 100 INJECTION, SOLUTION SUBCUTANEOUS at 21:51

## 2023-12-28 RX ADMIN — OXYCODONE HYDROCHLORIDE 60 MILLIGRAM(S): 5 TABLET ORAL at 06:49

## 2023-12-28 RX ADMIN — INSULIN GLARGINE 50 UNIT(S): 100 INJECTION, SOLUTION SUBCUTANEOUS at 08:52

## 2023-12-28 RX ADMIN — Medication 1 APPLICATION(S): at 17:17

## 2023-12-28 RX ADMIN — ONDANSETRON 4 MILLIGRAM(S): 8 TABLET, FILM COATED ORAL at 06:45

## 2023-12-28 RX ADMIN — ATORVASTATIN CALCIUM 80 MILLIGRAM(S): 80 TABLET, FILM COATED ORAL at 21:51

## 2023-12-28 RX ADMIN — Medication 500 MICROGRAM(S): at 10:38

## 2023-12-28 RX ADMIN — NYSTATIN CREAM 1 APPLICATION(S): 100000 CREAM TOPICAL at 17:17

## 2023-12-28 RX ADMIN — Medication 6: at 08:51

## 2023-12-28 RX ADMIN — Medication 500 MICROGRAM(S): at 21:25

## 2023-12-28 RX ADMIN — NYSTATIN CREAM 1 APPLICATION(S): 100000 CREAM TOPICAL at 06:50

## 2023-12-28 RX ADMIN — LEVALBUTEROL 0.63 MILLIGRAM(S): 1.25 SOLUTION, CONCENTRATE RESPIRATORY (INHALATION) at 21:25

## 2023-12-28 RX ADMIN — CHLORHEXIDINE GLUCONATE 1 APPLICATION(S): 213 SOLUTION TOPICAL at 12:50

## 2023-12-28 RX ADMIN — Medication 1 APPLICATION(S): at 06:49

## 2023-12-28 RX ADMIN — HEPARIN SODIUM 1000 UNIT(S)/HR: 5000 INJECTION INTRAVENOUS; SUBCUTANEOUS at 07:28

## 2023-12-28 RX ADMIN — SENNA PLUS 2 TABLET(S): 8.6 TABLET ORAL at 21:52

## 2023-12-28 RX ADMIN — ALBUTEROL 2 PUFF(S): 90 AEROSOL, METERED ORAL at 08:55

## 2023-12-28 RX ADMIN — SODIUM CHLORIDE 250 MILLILITER(S): 9 INJECTION INTRAMUSCULAR; INTRAVENOUS; SUBCUTANEOUS at 10:34

## 2023-12-28 RX ADMIN — Medication 8: at 17:16

## 2023-12-28 RX ADMIN — Medication 40 UNIT(S): at 17:16

## 2023-12-28 RX ADMIN — Medication 50 MILLIGRAM(S): at 06:49

## 2023-12-28 RX ADMIN — OXYCODONE HYDROCHLORIDE 5 MILLIGRAM(S): 5 TABLET ORAL at 21:58

## 2023-12-28 RX ADMIN — LEVALBUTEROL 0.63 MILLIGRAM(S): 1.25 SOLUTION, CONCENTRATE RESPIRATORY (INHALATION) at 03:38

## 2023-12-28 RX ADMIN — Medication 75 MILLIGRAM(S): at 21:51

## 2023-12-28 RX ADMIN — Medication 4 MILLILITER(S): at 10:41

## 2023-12-28 RX ADMIN — Medication 500 MICROGRAM(S): at 15:19

## 2023-12-28 RX ADMIN — Medication 75 MILLIGRAM(S): at 12:55

## 2023-12-28 RX ADMIN — Medication 6: at 12:49

## 2023-12-28 RX ADMIN — LEVALBUTEROL 0.63 MILLIGRAM(S): 1.25 SOLUTION, CONCENTRATE RESPIRATORY (INHALATION) at 10:38

## 2023-12-28 RX ADMIN — Medication 500 MICROGRAM(S): at 03:38

## 2023-12-28 RX ADMIN — FAMOTIDINE 20 MILLIGRAM(S): 10 INJECTION INTRAVENOUS at 12:50

## 2023-12-28 NOTE — PROGRESS NOTE ADULT - NUTRITIONAL ASSESSMENT
This patient has been assessed with a concern for Malnutrition and has been determined to have a diagnosis/diagnoses of Severe protein-calorie malnutrition and Morbid obesity (BMI > 40).    This patient is being managed with:   Diet NPO after Midnight-     NPO Start Date: 27-Dec-2023   NPO Start Time: 23:59  Entered: Dec 27 2023  3:14PM    Diet Consistent Carbohydrate w/Evening Snack-  1500mL Fluid Restriction ([id number])  Low Sodium  Entered: Dec 19 2023  6:23PM   This patient has been assessed with a concern for Malnutrition and has been determined to have a diagnosis/diagnoses of Severe protein-calorie malnutrition and Morbid obesity (BMI > 40).    This patient is being managed with:   Diet NPO after Midnight-     NPO Start Date: 27-Dec-2023   NPO Start Time: 23:59  Entered: Dec 27 2023  3:14PM    Diet Consistent Carbohydrate w/Evening Snack-  1500mL Fluid Restriction ([id number])  Low Sodium  Entered: Dec 19 2023  6:23PM

## 2023-12-28 NOTE — DIETITIAN NUTRITION RISK NOTIFICATION - ADDITIONAL COMMENTS/DIETITIAN RECOMMENDATIONS
Pt will meet >75% of estimated protein-energy needs via tolerated route   Encourage po intake, monitor diet tolerance, and provide assistance at meals as needed.   Rx: MVI daily, OsCal supplementation   Monitor electrolytes and replete prn

## 2023-12-28 NOTE — PROGRESS NOTE ADULT - NUTRITIONAL ASSESSMENT
This patient has been assessed with a concern for Malnutrition and has been determined to have a diagnosis/diagnoses of Severe protein-calorie malnutrition and Morbid obesity (BMI > 40).    This patient is being managed with:   Diet Consistent Carbohydrate w/Evening Snack-  1500mL Fluid Restriction ([id number])  Low Sodium  Entered: Dec 19 2023  6:23PM   This patient has been assessed with a concern for Malnutrition and has been determined to have a diagnosis/diagnoses of Severe protein-calorie malnutrition and Morbid obesity (BMI > 40).    This patient is being managed with:   Diet Consistent Carbohydrate w/Evening Snack-  1500mL Fluid Restriction ([id number])  Low Sodium  Entered: Dec 19 2023  6:23PM

## 2023-12-28 NOTE — DIETITIAN NUTRITION RISK NOTIFICATION - TREATMENT: THE FOLLOWING DIET HAS BEEN RECOMMENDED
Diet, NPO after Midnight:      NPO Start Date: 27-Dec-2023,   NPO Start Time: 23:59 (12-27-23 @ 15:14) [Active]  Diet, Consistent Carbohydrate w/Evening Snack:   1500mL Fluid Restriction ([id number])  Low Sodium (12-19-23 @ 18:24) [Active]       Diet, NPO after Midnight:      NPO Start Date: 27-Dec-2023,   NPO Start Time: 23:59 (12-27-23 @ 15:14) [Active]  Diet, Consistent Carbohydrate w/Evening Snack:   1500mL Fluid Restriction ([id number])  Low Sodium (12-19-23 @ 18:24) [Active]

## 2023-12-28 NOTE — PROGRESS NOTE ADULT - SUBJECTIVE AND OBJECTIVE BOX
NEPHROLOGY INTERVAL HPI/OVERNIGHT EVENTS:    Raymond   Getting  ongoing diuresis   Appetite good    ml + with de la torre     MEDICATIONS  (STANDING):  acetylcysteine 20%  Inhalation 4 milliLiter(s) Inhalation daily  atorvastatin 80 milliGRAM(s) Oral at bedtime  budesonide  80 MICROgram(s)/formoterol 4.5 MICROgram(s) Inhaler 2 Puff(s) Inhalation two times a day  chlorhexidine 2% Cloths 1 Application(s) Topical <User Schedule>  clotrimazole 1% Cream 1 Application(s) Topical two times a day  dextrose 5%. 1000 milliLiter(s) (100 mL/Hr) IV Continuous <Continuous>  dextrose 5%. 1000 milliLiter(s) (50 mL/Hr) IV Continuous <Continuous>  dextrose 50% Injectable 12.5 Gram(s) IV Push once  dextrose 50% Injectable 25 Gram(s) IV Push once  dextrose 50% Injectable 25 Gram(s) IV Push once  famotidine    Tablet 20 milliGRAM(s) Oral daily  glucagon  Injectable 1 milliGRAM(s) IntraMuscular once  heparin  Infusion.  Unit(s)/Hr (24 mL/Hr) IV Continuous <Continuous>  insulin glargine Injectable (LANTUS) 50 Unit(s) SubCutaneous at bedtime  insulin glargine Injectable (LANTUS) 50 Unit(s) SubCutaneous every morning  insulin lispro (ADMELOG) corrective regimen sliding scale   SubCutaneous three times a day before meals  insulin lispro Injectable (ADMELOG) 40 Unit(s) SubCutaneous three times a day before meals  ipratropium    for Nebulization 500 MICROGram(s) Nebulizer every 6 hours  lactulose Syrup 20 Gram(s) Oral daily  levalbuterol Inhalation 0.63 milliGRAM(s) Inhalation every 6 hours  metoprolol tartrate 75 milliGRAM(s) Oral every 8 hours  nystatin Powder 1 Application(s) Topical two times a day  oxyCODONE  ER Tablet 60 milliGRAM(s) Oral every 12 hours  polyethylene glycol 3350 17 Gram(s) Oral daily  senna 2 Tablet(s) Oral at bedtime    MEDICATIONS  (PRN):  acetaminophen     Tablet .. 650 milliGRAM(s) Oral every 6 hours PRN Temp greater or equal to 38C (100.4F), Mild Pain (1 - 3)  albuterol    90 MICROgram(s) HFA Inhaler 2 Puff(s) Inhalation every 2 hours PRN Shortness of Breath and/or Wheezing  dextrose Oral Gel 15 Gram(s) Oral once PRN Blood Glucose LESS THAN 70 milliGRAM(s)/deciliter  diphenhydrAMINE 25 milliGRAM(s) Oral every 6 hours PRN Rash and/or Itching  heparin   Injectable 5000 Unit(s) IV Push every 6 hours PRN For aPTT between 40 - 57  heparin   Injectable 30397 Unit(s) IV Push every 6 hours PRN For aPTT less than 40  hydrALAZINE Injectable 10 milliGRAM(s) IV Push every 4 hours PRN for systolic bp > 160  HYDROmorphone  Injectable 1 milliGRAM(s) IV Push every 4 hours PRN breakthrough  pain  melatonin 3 milliGRAM(s) Oral at bedtime PRN Insomnia  metoprolol tartrate Injectable 5 milliGRAM(s) IV Push every 6 hours PRN heart rate sustaining greater than 130  ondansetron Injectable 4 milliGRAM(s) IV Push every 8 hours PRN Nausea and/or Vomiting  oxyCODONE    IR 5 milliGRAM(s) Oral every 6 hours PRN Moderate Pain (4 - 6)      Allergies    wool- rash, itch (Other)  adhesives (Rash)  latex (Rash)  Bactrim (Flushing)    Intolerances          Vital Signs Last 24 Hrs  T(C): 36.7 (28 Dec 2023 16:23), Max: 37 (28 Dec 2023 05:11)  T(F): 98 (28 Dec 2023 16:23), Max: 98.6 (28 Dec 2023 05:11)  HR: 98 (28 Dec 2023 16:23) (98 - 124)  BP: 115/57 (28 Dec 2023 16:23) (103/67 - 136/71)  BP(mean): 76 (28 Dec 2023 16:23) (76 - 76)  RR: 20 (28 Dec 2023 16:23) (17 - 20)  SpO2: 92% (28 Dec 2023 16:23) (92% - 100%)    Parameters below as of 28 Dec 2023 16:23  Patient On (Oxygen Delivery Method): nasal cannula  O2 Flow (L/min): 2    Daily     Daily   I&O's Detail    27 Dec 2023 07:01  -  28 Dec 2023 07:00  --------------------------------------------------------  IN:    Heparin Infusion: 260 mL    IV PiggyBack: 250 mL    Oral Fluid: 600 mL    Sodium Chloride 0.9% Bolus: 250 mL  Total IN: 1360 mL    OUT:    Voided (mL): 2750 mL  Total OUT: 2750 mL    Total NET: -1390 mL      28 Dec 2023 07:01  -  28 Dec 2023 19:05  --------------------------------------------------------  IN:    Heparin Infusion: 80 mL    Sodium Chloride 0.9% Bolus: 250 mL  Total IN: 330 mL    OUT:    Indwelling Catheter - Urethral (mL): 900 mL  Total OUT: 900 mL    Total NET: -570 mL        I&O's Summary    27 Dec 2023 07:01  -  28 Dec 2023 07:00  --------------------------------------------------------  IN: 1360 mL / OUT: 2750 mL / NET: -1390 mL    28 Dec 2023 07:01  -  28 Dec 2023 19:05  --------------------------------------------------------  IN: 330 mL / OUT: 900 mL / NET: -570 mL        PHYSICAL EXAM:    Gen: obese woman  	HEENT: +NC  	Pulm: decreased bs  	CV: RRR, S1S2; no rub  	Back: No spinal or CVA tenderness; no sacral edema  	Abd: +BS, soft, nontender/nondistended  	: No suprapubic tenderness  	LE: Warm, leg pitting edema   LABS:                        13.5   31.44 )-----------( 259      ( 28 Dec 2023 04:37 )             41.7     12-28    133<L>  |  92<L>  |  71.5<H>  ----------------------------<  199<H>  4.9   |  31.0<H>  |  1.57<H>    Ca    9.2      28 Dec 2023 12:38  Mg     2.6     12-27      PTT - ( 28 Dec 2023 04:37 )  PTT:58.8 sec  Urinalysis Basic - ( 28 Dec 2023 12:38 )    Color: x / Appearance: x / SG: x / pH: x  Gluc: 199 mg/dL / Ketone: x  / Bili: x / Urobili: x   Blood: x / Protein: x / Nitrite: x   Leuk Esterase: x / RBC: x / WBC x   Sq Epi: x / Non Sq Epi: x / Bacteria: x              RADIOLOGY & ADDITIONAL TESTS:   NEPHROLOGY INTERVAL HPI/OVERNIGHT EVENTS:    Raymond   Getting  ongoing diuresis   Appetite good    ml + with de la torre     MEDICATIONS  (STANDING):  acetylcysteine 20%  Inhalation 4 milliLiter(s) Inhalation daily  atorvastatin 80 milliGRAM(s) Oral at bedtime  budesonide  80 MICROgram(s)/formoterol 4.5 MICROgram(s) Inhaler 2 Puff(s) Inhalation two times a day  chlorhexidine 2% Cloths 1 Application(s) Topical <User Schedule>  clotrimazole 1% Cream 1 Application(s) Topical two times a day  dextrose 5%. 1000 milliLiter(s) (100 mL/Hr) IV Continuous <Continuous>  dextrose 5%. 1000 milliLiter(s) (50 mL/Hr) IV Continuous <Continuous>  dextrose 50% Injectable 12.5 Gram(s) IV Push once  dextrose 50% Injectable 25 Gram(s) IV Push once  dextrose 50% Injectable 25 Gram(s) IV Push once  famotidine    Tablet 20 milliGRAM(s) Oral daily  glucagon  Injectable 1 milliGRAM(s) IntraMuscular once  heparin  Infusion.  Unit(s)/Hr (24 mL/Hr) IV Continuous <Continuous>  insulin glargine Injectable (LANTUS) 50 Unit(s) SubCutaneous at bedtime  insulin glargine Injectable (LANTUS) 50 Unit(s) SubCutaneous every morning  insulin lispro (ADMELOG) corrective regimen sliding scale   SubCutaneous three times a day before meals  insulin lispro Injectable (ADMELOG) 40 Unit(s) SubCutaneous three times a day before meals  ipratropium    for Nebulization 500 MICROGram(s) Nebulizer every 6 hours  lactulose Syrup 20 Gram(s) Oral daily  levalbuterol Inhalation 0.63 milliGRAM(s) Inhalation every 6 hours  metoprolol tartrate 75 milliGRAM(s) Oral every 8 hours  nystatin Powder 1 Application(s) Topical two times a day  oxyCODONE  ER Tablet 60 milliGRAM(s) Oral every 12 hours  polyethylene glycol 3350 17 Gram(s) Oral daily  senna 2 Tablet(s) Oral at bedtime    MEDICATIONS  (PRN):  acetaminophen     Tablet .. 650 milliGRAM(s) Oral every 6 hours PRN Temp greater or equal to 38C (100.4F), Mild Pain (1 - 3)  albuterol    90 MICROgram(s) HFA Inhaler 2 Puff(s) Inhalation every 2 hours PRN Shortness of Breath and/or Wheezing  dextrose Oral Gel 15 Gram(s) Oral once PRN Blood Glucose LESS THAN 70 milliGRAM(s)/deciliter  diphenhydrAMINE 25 milliGRAM(s) Oral every 6 hours PRN Rash and/or Itching  heparin   Injectable 5000 Unit(s) IV Push every 6 hours PRN For aPTT between 40 - 57  heparin   Injectable 16205 Unit(s) IV Push every 6 hours PRN For aPTT less than 40  hydrALAZINE Injectable 10 milliGRAM(s) IV Push every 4 hours PRN for systolic bp > 160  HYDROmorphone  Injectable 1 milliGRAM(s) IV Push every 4 hours PRN breakthrough  pain  melatonin 3 milliGRAM(s) Oral at bedtime PRN Insomnia  metoprolol tartrate Injectable 5 milliGRAM(s) IV Push every 6 hours PRN heart rate sustaining greater than 130  ondansetron Injectable 4 milliGRAM(s) IV Push every 8 hours PRN Nausea and/or Vomiting  oxyCODONE    IR 5 milliGRAM(s) Oral every 6 hours PRN Moderate Pain (4 - 6)      Allergies    wool- rash, itch (Other)  adhesives (Rash)  latex (Rash)  Bactrim (Flushing)    Intolerances          Vital Signs Last 24 Hrs  T(C): 36.7 (28 Dec 2023 16:23), Max: 37 (28 Dec 2023 05:11)  T(F): 98 (28 Dec 2023 16:23), Max: 98.6 (28 Dec 2023 05:11)  HR: 98 (28 Dec 2023 16:23) (98 - 124)  BP: 115/57 (28 Dec 2023 16:23) (103/67 - 136/71)  BP(mean): 76 (28 Dec 2023 16:23) (76 - 76)  RR: 20 (28 Dec 2023 16:23) (17 - 20)  SpO2: 92% (28 Dec 2023 16:23) (92% - 100%)    Parameters below as of 28 Dec 2023 16:23  Patient On (Oxygen Delivery Method): nasal cannula  O2 Flow (L/min): 2    Daily     Daily   I&O's Detail    27 Dec 2023 07:01  -  28 Dec 2023 07:00  --------------------------------------------------------  IN:    Heparin Infusion: 260 mL    IV PiggyBack: 250 mL    Oral Fluid: 600 mL    Sodium Chloride 0.9% Bolus: 250 mL  Total IN: 1360 mL    OUT:    Voided (mL): 2750 mL  Total OUT: 2750 mL    Total NET: -1390 mL      28 Dec 2023 07:01  -  28 Dec 2023 19:05  --------------------------------------------------------  IN:    Heparin Infusion: 80 mL    Sodium Chloride 0.9% Bolus: 250 mL  Total IN: 330 mL    OUT:    Indwelling Catheter - Urethral (mL): 900 mL  Total OUT: 900 mL    Total NET: -570 mL        I&O's Summary    27 Dec 2023 07:01  -  28 Dec 2023 07:00  --------------------------------------------------------  IN: 1360 mL / OUT: 2750 mL / NET: -1390 mL    28 Dec 2023 07:01  -  28 Dec 2023 19:05  --------------------------------------------------------  IN: 330 mL / OUT: 900 mL / NET: -570 mL        PHYSICAL EXAM:    Gen: obese woman  	HEENT: +NC  	Pulm: decreased bs  	CV: RRR, S1S2; no rub  	Back: No spinal or CVA tenderness; no sacral edema  	Abd: +BS, soft, nontender/nondistended  	: No suprapubic tenderness  	LE: Warm, leg pitting edema   LABS:                        13.5   31.44 )-----------( 259      ( 28 Dec 2023 04:37 )             41.7     12-28    133<L>  |  92<L>  |  71.5<H>  ----------------------------<  199<H>  4.9   |  31.0<H>  |  1.57<H>    Ca    9.2      28 Dec 2023 12:38  Mg     2.6     12-27      PTT - ( 28 Dec 2023 04:37 )  PTT:58.8 sec  Urinalysis Basic - ( 28 Dec 2023 12:38 )    Color: x / Appearance: x / SG: x / pH: x  Gluc: 199 mg/dL / Ketone: x  / Bili: x / Urobili: x   Blood: x / Protein: x / Nitrite: x   Leuk Esterase: x / RBC: x / WBC x   Sq Epi: x / Non Sq Epi: x / Bacteria: x              RADIOLOGY & ADDITIONAL TESTS:

## 2023-12-28 NOTE — PROGRESS NOTE ADULT - SUBJECTIVE AND OBJECTIVE BOX
INTERVAL EVENTS:  Follow up diabetes management  NPO for procedure    ROS: No acute complaints at time of exam    MEDICATIONS  (STANDING):  acetylcysteine 20%  Inhalation 4 milliLiter(s) Inhalation daily  atorvastatin 80 milliGRAM(s) Oral at bedtime  budesonide  80 MICROgram(s)/formoterol 4.5 MICROgram(s) Inhaler 2 Puff(s) Inhalation two times a day  chlorhexidine 2% Cloths 1 Application(s) Topical <User Schedule>  clotrimazole 1% Cream 1 Application(s) Topical two times a day  dextrose 5%. 1000 milliLiter(s) (100 mL/Hr) IV Continuous <Continuous>  dextrose 5%. 1000 milliLiter(s) (50 mL/Hr) IV Continuous <Continuous>  dextrose 50% Injectable 25 Gram(s) IV Push once  dextrose 50% Injectable 12.5 Gram(s) IV Push once  dextrose 50% Injectable 25 Gram(s) IV Push once  famotidine    Tablet 20 milliGRAM(s) Oral daily  glucagon  Injectable 1 milliGRAM(s) IntraMuscular once  heparin  Infusion.  Unit(s)/Hr (24 mL/Hr) IV Continuous <Continuous>  insulin glargine Injectable (LANTUS) 50 Unit(s) SubCutaneous every morning  insulin glargine Injectable (LANTUS) 50 Unit(s) SubCutaneous at bedtime  insulin lispro (ADMELOG) corrective regimen sliding scale   SubCutaneous three times a day before meals  insulin lispro Injectable (ADMELOG) 40 Unit(s) SubCutaneous three times a day before meals  ipratropium    for Nebulization 500 MICROGram(s) Nebulizer every 6 hours  lactulose Syrup 20 Gram(s) Oral daily  levalbuterol Inhalation 0.63 milliGRAM(s) Inhalation every 6 hours  metoprolol tartrate 75 milliGRAM(s) Oral every 8 hours  nystatin Powder 1 Application(s) Topical two times a day  oxyCODONE  ER Tablet 60 milliGRAM(s) Oral every 12 hours  polyethylene glycol 3350 17 Gram(s) Oral daily  senna 2 Tablet(s) Oral at bedtime    MEDICATIONS  (PRN):  acetaminophen     Tablet .. 650 milliGRAM(s) Oral every 6 hours PRN Temp greater or equal to 38C (100.4F), Mild Pain (1 - 3)  albuterol    90 MICROgram(s) HFA Inhaler 2 Puff(s) Inhalation every 2 hours PRN Shortness of Breath and/or Wheezing  dextrose Oral Gel 15 Gram(s) Oral once PRN Blood Glucose LESS THAN 70 milliGRAM(s)/deciliter  diphenhydrAMINE 25 milliGRAM(s) Oral every 6 hours PRN Rash and/or Itching  heparin   Injectable 5000 Unit(s) IV Push every 6 hours PRN For aPTT between 40 - 57  heparin   Injectable 52086 Unit(s) IV Push every 6 hours PRN For aPTT less than 40  hydrALAZINE Injectable 10 milliGRAM(s) IV Push every 4 hours PRN for systolic bp > 160  HYDROmorphone  Injectable 1 milliGRAM(s) IV Push every 4 hours PRN breakthrough  pain  melatonin 3 milliGRAM(s) Oral at bedtime PRN Insomnia  metoprolol tartrate Injectable 5 milliGRAM(s) IV Push every 6 hours PRN heart rate sustaining greater than 130  ondansetron Injectable 4 milliGRAM(s) IV Push every 8 hours PRN Nausea and/or Vomiting  oxyCODONE    IR 5 milliGRAM(s) Oral every 6 hours PRN Moderate Pain (4 - 6)    Allergies  wool- rash, itch (Other)  adhesives (Rash)  latex (Rash)  Bactrim (Flushing)    Vital Signs Last 24 Hrs  T(C): 36.9 (28 Dec 2023 09:57), Max: 37 (28 Dec 2023 05:11)  T(F): 98.5 (28 Dec 2023 09:57), Max: 98.6 (28 Dec 2023 05:11)  HR: 100 (28 Dec 2023 10:48) (100 - 124)  BP: 107/43 (28 Dec 2023 09:57) (103/67 - 136/71)  BP(mean): 72 (27 Dec 2023 13:00) (72 - 72)  RR: 17 (28 Dec 2023 09:57) (17 - 20)  SpO2: 98% (28 Dec 2023 10:48) (95% - 100%)    Parameters below as of 28 Dec 2023 10:48  Patient On (Oxygen Delivery Method): nasal cannula, 4L    PHYSICAL EXAM:  General: No apparent distress, obese  Neck: Supple, trachea midline, no thyromegaly  Respiratory: Lungs clear bilaterally, normal rate, effort  Cardiac: +S1, S2, no m/r/g  GI: +BS, soft, non tender, non distended  Extremities: LE redness/dryness  Neuro: A+O X3, no tremor    LABS:                        13.5   31.44 )-----------( 259      ( 28 Dec 2023 04:37 )             41.7     12-28    134<L>  |  94<L>  |  75.1<H>  ----------------------------<  219<H>  5.6<H>   |  30.0<H>  |  1.66<H>    Ca    9.2      28 Dec 2023 04:37  Mg     2.6     12-27      Urinalysis Basic - ( 28 Dec 2023 04:37 )    Color: x / Appearance: x / SG: x / pH: x  Gluc: 219 mg/dL / Ketone: x  / Bili: x / Urobili: x   Blood: x / Protein: x / Nitrite: x   Leuk Esterase: x / RBC: x / WBC x   Sq Epi: x / Non Sq Epi: x / Bacteria: x    POCT Blood Glucose.: 211 mg/dL (12-28-23 @ 12:16)  POCT Blood Glucose.: 219 mg/dL (12-28-23 @ 08:19)  POCT Blood Glucose.: 260 mg/dL (12-27-23 @ 21:55)  POCT Blood Glucose.: 195 mg/dL (12-27-23 @ 17:25)    Thyroid Stimulating Hormone, Serum: 0.34 uIU/mL (12-22-23 @ 04:51)  Thyroid Stimulating Hormone, Serum: 1.51 uIU/mL (12-16-23 @ 15:20)  Thyroid Stimulating Hormone, Serum: 3.02 uIU/mL (12-16-23 @ 10:18)   INTERVAL EVENTS:  Follow up diabetes management  NPO for procedure    ROS: No acute complaints at time of exam    MEDICATIONS  (STANDING):  acetylcysteine 20%  Inhalation 4 milliLiter(s) Inhalation daily  atorvastatin 80 milliGRAM(s) Oral at bedtime  budesonide  80 MICROgram(s)/formoterol 4.5 MICROgram(s) Inhaler 2 Puff(s) Inhalation two times a day  chlorhexidine 2% Cloths 1 Application(s) Topical <User Schedule>  clotrimazole 1% Cream 1 Application(s) Topical two times a day  dextrose 5%. 1000 milliLiter(s) (100 mL/Hr) IV Continuous <Continuous>  dextrose 5%. 1000 milliLiter(s) (50 mL/Hr) IV Continuous <Continuous>  dextrose 50% Injectable 25 Gram(s) IV Push once  dextrose 50% Injectable 12.5 Gram(s) IV Push once  dextrose 50% Injectable 25 Gram(s) IV Push once  famotidine    Tablet 20 milliGRAM(s) Oral daily  glucagon  Injectable 1 milliGRAM(s) IntraMuscular once  heparin  Infusion.  Unit(s)/Hr (24 mL/Hr) IV Continuous <Continuous>  insulin glargine Injectable (LANTUS) 50 Unit(s) SubCutaneous every morning  insulin glargine Injectable (LANTUS) 50 Unit(s) SubCutaneous at bedtime  insulin lispro (ADMELOG) corrective regimen sliding scale   SubCutaneous three times a day before meals  insulin lispro Injectable (ADMELOG) 40 Unit(s) SubCutaneous three times a day before meals  ipratropium    for Nebulization 500 MICROGram(s) Nebulizer every 6 hours  lactulose Syrup 20 Gram(s) Oral daily  levalbuterol Inhalation 0.63 milliGRAM(s) Inhalation every 6 hours  metoprolol tartrate 75 milliGRAM(s) Oral every 8 hours  nystatin Powder 1 Application(s) Topical two times a day  oxyCODONE  ER Tablet 60 milliGRAM(s) Oral every 12 hours  polyethylene glycol 3350 17 Gram(s) Oral daily  senna 2 Tablet(s) Oral at bedtime    MEDICATIONS  (PRN):  acetaminophen     Tablet .. 650 milliGRAM(s) Oral every 6 hours PRN Temp greater or equal to 38C (100.4F), Mild Pain (1 - 3)  albuterol    90 MICROgram(s) HFA Inhaler 2 Puff(s) Inhalation every 2 hours PRN Shortness of Breath and/or Wheezing  dextrose Oral Gel 15 Gram(s) Oral once PRN Blood Glucose LESS THAN 70 milliGRAM(s)/deciliter  diphenhydrAMINE 25 milliGRAM(s) Oral every 6 hours PRN Rash and/or Itching  heparin   Injectable 5000 Unit(s) IV Push every 6 hours PRN For aPTT between 40 - 57  heparin   Injectable 26450 Unit(s) IV Push every 6 hours PRN For aPTT less than 40  hydrALAZINE Injectable 10 milliGRAM(s) IV Push every 4 hours PRN for systolic bp > 160  HYDROmorphone  Injectable 1 milliGRAM(s) IV Push every 4 hours PRN breakthrough  pain  melatonin 3 milliGRAM(s) Oral at bedtime PRN Insomnia  metoprolol tartrate Injectable 5 milliGRAM(s) IV Push every 6 hours PRN heart rate sustaining greater than 130  ondansetron Injectable 4 milliGRAM(s) IV Push every 8 hours PRN Nausea and/or Vomiting  oxyCODONE    IR 5 milliGRAM(s) Oral every 6 hours PRN Moderate Pain (4 - 6)    Allergies  wool- rash, itch (Other)  adhesives (Rash)  latex (Rash)  Bactrim (Flushing)    Vital Signs Last 24 Hrs  T(C): 36.9 (28 Dec 2023 09:57), Max: 37 (28 Dec 2023 05:11)  T(F): 98.5 (28 Dec 2023 09:57), Max: 98.6 (28 Dec 2023 05:11)  HR: 100 (28 Dec 2023 10:48) (100 - 124)  BP: 107/43 (28 Dec 2023 09:57) (103/67 - 136/71)  BP(mean): 72 (27 Dec 2023 13:00) (72 - 72)  RR: 17 (28 Dec 2023 09:57) (17 - 20)  SpO2: 98% (28 Dec 2023 10:48) (95% - 100%)    Parameters below as of 28 Dec 2023 10:48  Patient On (Oxygen Delivery Method): nasal cannula, 4L    PHYSICAL EXAM:  General: No apparent distress, obese  Neck: Supple, trachea midline, no thyromegaly  Respiratory: Lungs clear bilaterally, normal rate, effort  Cardiac: +S1, S2, no m/r/g  GI: +BS, soft, non tender, non distended  Extremities: LE redness/dryness  Neuro: A+O X3, no tremor    LABS:                        13.5   31.44 )-----------( 259      ( 28 Dec 2023 04:37 )             41.7     12-28    134<L>  |  94<L>  |  75.1<H>  ----------------------------<  219<H>  5.6<H>   |  30.0<H>  |  1.66<H>    Ca    9.2      28 Dec 2023 04:37  Mg     2.6     12-27      Urinalysis Basic - ( 28 Dec 2023 04:37 )    Color: x / Appearance: x / SG: x / pH: x  Gluc: 219 mg/dL / Ketone: x  / Bili: x / Urobili: x   Blood: x / Protein: x / Nitrite: x   Leuk Esterase: x / RBC: x / WBC x   Sq Epi: x / Non Sq Epi: x / Bacteria: x    POCT Blood Glucose.: 211 mg/dL (12-28-23 @ 12:16)  POCT Blood Glucose.: 219 mg/dL (12-28-23 @ 08:19)  POCT Blood Glucose.: 260 mg/dL (12-27-23 @ 21:55)  POCT Blood Glucose.: 195 mg/dL (12-27-23 @ 17:25)    Thyroid Stimulating Hormone, Serum: 0.34 uIU/mL (12-22-23 @ 04:51)  Thyroid Stimulating Hormone, Serum: 1.51 uIU/mL (12-16-23 @ 15:20)  Thyroid Stimulating Hormone, Serum: 3.02 uIU/mL (12-16-23 @ 10:18)

## 2023-12-28 NOTE — PROGRESS NOTE ADULT - SUBJECTIVE AND OBJECTIVE BOX
[facility]                                                         CARDIOLOGY AT [facility]                                                                  [address] [facility], [place] [place] [address]                                                         Telephone: [phone]. Fax:[phone]                                                                             PROGRESS NOTE    Reason for follow up: HFrEF, PHT  Update:  on 3L NC now  better respiratory status  improvement in renal function      Review of symptoms:   Cardiac:  No chest pain. No dyspnea. No palpitations.  Respiratory: no cough. No dyspnea  Gastrointestinal: No diarrhea. No abdominal pain. No bleeding.   Neuro: No focal neuro complaints.    Vitals:  T(C): 36.7 (12-28-23 @ 12:58), Max: 37 (12-28-23 @ 05:11)  HR: 113 (12-28-23 @ 12:58) (100 - 124)  BP: 113/66 (12-28-23 @ 12:58) (103/67 - 136/71)  RR: 18 (12-28-23 @ 12:58) (17 - 20)  SpO2: 99% (12-28-23 @ 12:58) (95% - 100%)  Wt(kg): --  I&O's Summary    27 Dec 2023 07:01  -  28 Dec 2023 07:00  --------------------------------------------------------  IN: 1360 mL / OUT: 2750 mL / NET: -1390 mL          PHYSICAL EXAM:  Appearance: Comfortable. No acute distress  HEENT:  Atraumatic. Normocephalic.  Normal oral mucosa  Neurologic: A & O x 3, no gross focal deficits.  Cardiovascular: Irregularly irregular. No murmur, no rubs/gallops. No JVD  Respiratory: B/L lungs are coarse, unlabored, NC in place 3L  Gastrointestinal:  Soft, Non-tender, + BS  Lower Extremities: 2+ Peripheral Pulses, No clubbing, cyanosis. B/L LE +1 edema  Psychiatry: Patient is calm. No agitation.   Skin: warm and dry.    CURRENT CARDIAC MEDICATIONS:  hydrALAZINE Injectable 10 milliGRAM(s) IV Push every 4 hours PRN  metoprolol tartrate 75 milliGRAM(s) Oral every 8 hours  metoprolol tartrate Injectable 5 milliGRAM(s) IV Push every 6 hours PRN      CURRENT OTHER MEDICATIONS:  acetylcysteine 20%  Inhalation 4 milliLiter(s) Inhalation daily  albuterol    90 MICROgram(s) HFA Inhaler 2 Puff(s) Inhalation every 2 hours PRN Shortness of Breath and/or Wheezing  budesonide  80 MICROgram(s)/formoterol 4.5 MICROgram(s) Inhaler 2 Puff(s) Inhalation two times a day  ipratropium    for Nebulization 500 MICROGram(s) Nebulizer every 6 hours  levalbuterol Inhalation 0.63 milliGRAM(s) Inhalation every 6 hours  acetaminophen     Tablet .. 650 milliGRAM(s) Oral every 6 hours PRN Temp greater or equal to 38C (100.4F), Mild Pain (1 - 3)  diphenhydrAMINE 25 milliGRAM(s) Oral every 6 hours PRN Rash and/or Itching  HYDROmorphone  Injectable 1 milliGRAM(s) IV Push every 4 hours PRN breakthrough  pain  melatonin 3 milliGRAM(s) Oral at bedtime PRN Insomnia  ondansetron Injectable 4 milliGRAM(s) IV Push every 8 hours PRN Nausea and/or Vomiting  oxyCODONE    IR 5 milliGRAM(s) Oral every 6 hours PRN Moderate Pain (4 - 6)  oxyCODONE  ER Tablet 60 milliGRAM(s) Oral every 12 hours  famotidine    Tablet 20 milliGRAM(s) Oral daily  lactulose Syrup 20 Gram(s) Oral daily  polyethylene glycol 3350 17 Gram(s) Oral daily  senna 2 Tablet(s) Oral at bedtime  atorvastatin 80 milliGRAM(s) Oral at bedtime  dextrose 50% Injectable 25 Gram(s) IV Push once, Stop order after: 1 Doses  dextrose 50% Injectable 12.5 Gram(s) IV Push once, Stop order after: 1 Doses  dextrose 50% Injectable 25 Gram(s) IV Push once, Stop order after: 1 Doses  dextrose Oral Gel 15 Gram(s) Oral once, Stop order after: 1 Doses PRN Blood Glucose LESS THAN 70 milliGRAM(s)/deciliter  glucagon  Injectable 1 milliGRAM(s) IntraMuscular once, Stop order after: 1 Doses  insulin glargine Injectable (LANTUS) 50 Unit(s) SubCutaneous at bedtime  insulin glargine Injectable (LANTUS) 50 Unit(s) SubCutaneous every morning  insulin lispro (ADMELOG) corrective regimen sliding scale   SubCutaneous three times a day before meals  insulin lispro Injectable (ADMELOG) 40 Unit(s) SubCutaneous three times a day before meals  chlorhexidine 2% Cloths 1 Application(s) Topical <User Schedule>  clotrimazole 1% Cream 1 Application(s) Topical two times a day  dextrose 5%. 1000 milliLiter(s) (50 mL/Hr) IV Continuous <Continuous>  dextrose 5%. 1000 milliLiter(s) (100 mL/Hr) IV Continuous <Continuous>  heparin   Injectable 63790 Unit(s) IV Push every 6 hours PRN For aPTT less than 40  heparin   Injectable 5000 Unit(s) IV Push every 6 hours PRN For aPTT between 40 - 57  heparin  Infusion.  Unit(s)/Hr (24 mL/Hr) IV Continuous <Continuous>  nystatin Powder 1 Application(s) Topical two times a day      LABS:	 	                            13.5   31.44 )-----------( 259      ( 28 Dec 2023 04:37 )             41.7     12-28    133<L>  |  92<L>  |  71.5<H>  ----------------------------<  199<H>  4.9   |  31.0<H>  |  1.57<H>    Ca    9.2      28 Dec 2023 12:38  Mg     2.6     12-27      PT/INR/PTT ( 28 Dec 2023 04:37 )                       :                       :      X            :       58.8                  .        .                   .              .           .       X           .                                       Lipid Profile: Date: 12-17 @ 08:20  Total cholesterol 140; Direct LDL: --; HDL: 70; Triglycerides:133    HgA1c:   TSH: Thyroid Stimulating Hormone, Serum: 0.34 uIU/mL      TELEMETRY:   ECG:    DIAGNOSTIC TESTING:  [ ] Echocardiogram:  Echocardiogram: < from: TTE W or WO Ultrasound Enhancing Agent (12.18.23 @ 13:36) >      1. Technically difficult image quality.   2. Normal biventricular systolic function.   3. Compared to the transthoracic echocardiogram performed on 8/31/2023.    < end of copied text >  [ ]  Catheterization:  [ ] Stress Test:    OTHER: 	                                                                [facility]                                                         CARDIOLOGY AT [facility]                                                                  [address] [facility], [place] [place] [address]                                                         Telephone: [phone]. Fax:[phone]                                                                             PROGRESS NOTE    Reason for follow up: HFrEF, PHT  Update:  on 3L NC now  better respiratory status  improvement in renal function      Review of symptoms:   Cardiac:  No chest pain. No dyspnea. No palpitations.  Respiratory: no cough. No dyspnea  Gastrointestinal: No diarrhea. No abdominal pain. No bleeding.   Neuro: No focal neuro complaints.    Vitals:  T(C): 36.7 (12-28-23 @ 12:58), Max: 37 (12-28-23 @ 05:11)  HR: 113 (12-28-23 @ 12:58) (100 - 124)  BP: 113/66 (12-28-23 @ 12:58) (103/67 - 136/71)  RR: 18 (12-28-23 @ 12:58) (17 - 20)  SpO2: 99% (12-28-23 @ 12:58) (95% - 100%)  Wt(kg): --  I&O's Summary    27 Dec 2023 07:01  -  28 Dec 2023 07:00  --------------------------------------------------------  IN: 1360 mL / OUT: 2750 mL / NET: -1390 mL          PHYSICAL EXAM:  Appearance: Comfortable. No acute distress  HEENT:  Atraumatic. Normocephalic.  Normal oral mucosa  Neurologic: A & O x 3, no gross focal deficits.  Cardiovascular: Irregularly irregular. No murmur, no rubs/gallops. No JVD  Respiratory: B/L lungs are coarse, unlabored, NC in place 3L  Gastrointestinal:  Soft, Non-tender, + BS  Lower Extremities: 2+ Peripheral Pulses, No clubbing, cyanosis. B/L LE +1 edema  Psychiatry: Patient is calm. No agitation.   Skin: warm and dry.    CURRENT CARDIAC MEDICATIONS:  hydrALAZINE Injectable 10 milliGRAM(s) IV Push every 4 hours PRN  metoprolol tartrate 75 milliGRAM(s) Oral every 8 hours  metoprolol tartrate Injectable 5 milliGRAM(s) IV Push every 6 hours PRN      CURRENT OTHER MEDICATIONS:  acetylcysteine 20%  Inhalation 4 milliLiter(s) Inhalation daily  albuterol    90 MICROgram(s) HFA Inhaler 2 Puff(s) Inhalation every 2 hours PRN Shortness of Breath and/or Wheezing  budesonide  80 MICROgram(s)/formoterol 4.5 MICROgram(s) Inhaler 2 Puff(s) Inhalation two times a day  ipratropium    for Nebulization 500 MICROGram(s) Nebulizer every 6 hours  levalbuterol Inhalation 0.63 milliGRAM(s) Inhalation every 6 hours  acetaminophen     Tablet .. 650 milliGRAM(s) Oral every 6 hours PRN Temp greater or equal to 38C (100.4F), Mild Pain (1 - 3)  diphenhydrAMINE 25 milliGRAM(s) Oral every 6 hours PRN Rash and/or Itching  HYDROmorphone  Injectable 1 milliGRAM(s) IV Push every 4 hours PRN breakthrough  pain  melatonin 3 milliGRAM(s) Oral at bedtime PRN Insomnia  ondansetron Injectable 4 milliGRAM(s) IV Push every 8 hours PRN Nausea and/or Vomiting  oxyCODONE    IR 5 milliGRAM(s) Oral every 6 hours PRN Moderate Pain (4 - 6)  oxyCODONE  ER Tablet 60 milliGRAM(s) Oral every 12 hours  famotidine    Tablet 20 milliGRAM(s) Oral daily  lactulose Syrup 20 Gram(s) Oral daily  polyethylene glycol 3350 17 Gram(s) Oral daily  senna 2 Tablet(s) Oral at bedtime  atorvastatin 80 milliGRAM(s) Oral at bedtime  dextrose 50% Injectable 25 Gram(s) IV Push once, Stop order after: 1 Doses  dextrose 50% Injectable 12.5 Gram(s) IV Push once, Stop order after: 1 Doses  dextrose 50% Injectable 25 Gram(s) IV Push once, Stop order after: 1 Doses  dextrose Oral Gel 15 Gram(s) Oral once, Stop order after: 1 Doses PRN Blood Glucose LESS THAN 70 milliGRAM(s)/deciliter  glucagon  Injectable 1 milliGRAM(s) IntraMuscular once, Stop order after: 1 Doses  insulin glargine Injectable (LANTUS) 50 Unit(s) SubCutaneous at bedtime  insulin glargine Injectable (LANTUS) 50 Unit(s) SubCutaneous every morning  insulin lispro (ADMELOG) corrective regimen sliding scale   SubCutaneous three times a day before meals  insulin lispro Injectable (ADMELOG) 40 Unit(s) SubCutaneous three times a day before meals  chlorhexidine 2% Cloths 1 Application(s) Topical <User Schedule>  clotrimazole 1% Cream 1 Application(s) Topical two times a day  dextrose 5%. 1000 milliLiter(s) (50 mL/Hr) IV Continuous <Continuous>  dextrose 5%. 1000 milliLiter(s) (100 mL/Hr) IV Continuous <Continuous>  heparin   Injectable 18164 Unit(s) IV Push every 6 hours PRN For aPTT less than 40  heparin   Injectable 5000 Unit(s) IV Push every 6 hours PRN For aPTT between 40 - 57  heparin  Infusion.  Unit(s)/Hr (24 mL/Hr) IV Continuous <Continuous>  nystatin Powder 1 Application(s) Topical two times a day      LABS:	 	                            13.5   31.44 )-----------( 259      ( 28 Dec 2023 04:37 )             41.7     12-28    133<L>  |  92<L>  |  71.5<H>  ----------------------------<  199<H>  4.9   |  31.0<H>  |  1.57<H>    Ca    9.2      28 Dec 2023 12:38  Mg     2.6     12-27      PT/INR/PTT ( 28 Dec 2023 04:37 )                       :                       :      X            :       58.8                  .        .                   .              .           .       X           .                                       Lipid Profile: Date: 12-17 @ 08:20  Total cholesterol 140; Direct LDL: --; HDL: 70; Triglycerides:133    HgA1c:   TSH: Thyroid Stimulating Hormone, Serum: 0.34 uIU/mL      TELEMETRY:   ECG:    DIAGNOSTIC TESTING:  [ ] Echocardiogram:  Echocardiogram: < from: TTE W or WO Ultrasound Enhancing Agent (12.18.23 @ 13:36) >      1. Technically difficult image quality.   2. Normal biventricular systolic function.   3. Compared to the transthoracic echocardiogram performed on 8/31/2023.    < end of copied text >  [ ]  Catheterization:  [ ] Stress Test:    OTHER:

## 2023-12-28 NOTE — CONSULT NOTE ADULT - ASSESSMENT
full note to follow  patient with RSV with suspected superimposed bacterial pneumonia overall improved since admission even prior to broadening abx, now back to baseline o2 requirements- low suspicion for MRSA pneumonia  dyspnea-multifactorial. High risk for recurrent pneumonia  Leukocytosis likely reactive in the setting of steroids  procal not reliable in renal failure  DC abx and monitor  Send sputum cx if able  repeat Ct chest next week  No acute ID contraindication for rhc/lhc    will f/u  d/w Dr Alamo and Dr Snyder   67 yo female with HTN, HLD, DM2, HFpEF, CKD III, DVT, Uterine CA, COPD on home o2 who presented with complaints of shortness of breath. Patient reports that she has been sick all week and was recently started on Vantin and steroids while at the nursing home. Patient reports that her dyspnea just worsened and she told the nursing home to send her in for an evaluation. While in the ED, patient was placed on BIPAP and was given IV lasix with some improvement. RVP then results as RSV +. Patient had a CT scan which also showed pneumonia and pulmonary HTN. Patient was started on IV zosyn x 10 days as well as IV steroids tapered to po. Due to uptrending WBC and persistent o2 requirements concern for worsening pneumonia and abx broadened to vanco and golden 12/26, patient was tapered down to 5l o2 the same day ? anxiety component. BCX 12/26 ngtd, repeat UA neg. No diarrhea, phlebitis    Acute on chronic respiratory failure  Leukocytosis on steroids  RSV with superimposed bacterial pneumonia  Pulmonary hypertension  ? PE  HFPEF  CKD  COPD on home o2  Morbid obesity      patient with RSV with suspected superimposed bacterial pneumonia overall improved since admission even prior to broadening abx, now back to baseline o2 requirements- low suspicion for MRSA pneumonia  dyspnea-multifactorial. High risk for recurrent pneumonia  Leukocytosis likely reactive in the setting of steroids  procal not reliable in renal failure  DC abx and monitor  Send sputum cx if able  repeat Ct chest next week  No acute ID contraindication for rhc/lhc    will f/u  d/w Dr Alamo and Dr Snyder   69 yo female with HTN, HLD, DM2, HFpEF, CKD III, DVT, Uterine CA, COPD on home o2 who presented with complaints of shortness of breath. Patient reports that she has been sick all week and was recently started on Vantin and steroids while at the nursing home. Patient reports that her dyspnea just worsened and she told the nursing home to send her in for an evaluation. While in the ED, patient was placed on BIPAP and was given IV lasix with some improvement. RVP then results as RSV +. Patient had a CT scan which also showed pneumonia and pulmonary HTN. Patient was started on IV zosyn x 10 days as well as IV steroids tapered to po. Due to uptrending WBC and persistent o2 requirements concern for worsening pneumonia and abx broadened to vanco and golden 12/26, patient was tapered down to 5l o2 the same day ? anxiety component. BCX 12/26 ngtd, repeat UA neg. No diarrhea, phlebitis    Acute on chronic respiratory failure  Leukocytosis on steroids  RSV with superimposed bacterial pneumonia  Pulmonary hypertension  ? PE  HFPEF  CKD  COPD on home o2  Morbid obesity      patient with RSV with suspected superimposed bacterial pneumonia overall improved since admission even prior to broadening abx, now back to baseline o2 requirements- low suspicion for MRSA pneumonia  dyspnea-multifactorial. High risk for recurrent pneumonia  Leukocytosis likely reactive in the setting of steroids  procal not reliable in renal failure  DC abx and monitor  Send sputum cx if able  repeat Ct chest next week  No acute ID contraindication for rhc/lhc    will f/u  d/w Dr Alamo and Dr Snyder

## 2023-12-28 NOTE — PROGRESS NOTE ADULT - ASSESSMENT
69 yo female with HTN, HLD, DM2, HFpEF, CKD III, DVT, Uterine CA, COPD on home o2 who presented with complaints of shortness of breath. Patient reports that she has been sick all week and was recently started on Vantin and steroids while at the nursing home. Patient reports that her dyspnea just worsened and she told the nursing home to send her in for an evaluation. While in the ED, patient was placed on BIPAP and was given IV lasix with some improvement. RVP then results as RSV +. Patient had a CT scan which also showed pneumonia and pulmonary HTN. Admission to medicine was requested for further management.    #new afib:  echo w/ preserved EF  - EP consulted, no DCCV given resp issues  - cardiology following, plan for R/LHC when resp condition improves  - HR better controlled today  - c/w loprssor 50mg q8hr  - c/w heparin drip for [id number] 6 until [facility] with cardio  - spoke with Dr. Alamo of cardiology, for [facility] today    # Acute on chronic respiratory failure  # Acute HFpEF exacerbation with likely with Superimposed bacterial Pneumonia in setting of RSV  # Worsening PNA?  - WBC still trending up but pt remains afebrile and resp status improving (on NC)  - CT chest reads worsening middle lobe PNA  - sputum clx ordered for last several days, pt unable to bring up sputum  - repeat MRSA PCR negative  - ID consulted, WBC likely reactive to steroids  - d/c abx and monitor  - pt high risk for recurrent aspiration  - repeat blood clx NGTD  - tapering steroids   - levalbuterol and ipratropium q6hrs, symbicort BID   - IV Lasix on hold for JACQUES  - BIPAP qhs  - pulm following     # JACQUES  - likely combination of pre-renal and ATN  - lasix on hold  - improving today  - will pre-hydrate before and after cath  - nephrology consulted  - trend BMP    #Chronic pain  - continue home Pain meds of Oxycodone 60 mg q12h and Oxycodone 5mg q6h PRN    # HLD  - Atorvastatin for rosuvastatin    # COPD  - tapering steroids   - Symbicort, Spiriva, albuterol  - pulm following     #DM  #steroid induced hyperglycemia  - lantus/premeal insulin per endo  - lispro sliding scale    DVT prophylaxis: heparin ggt  Dispo: pending resp status optimization   69 yo female with HTN, HLD, DM2, HFpEF, CKD III, DVT, Uterine CA, COPD on home o2 who presented with complaints of shortness of breath. Patient reports that she has been sick all week and was recently started on Vantin and steroids while at the nursing home. Patient reports that her dyspnea just worsened and she told the nursing home to send her in for an evaluation. While in the ED, patient was placed on BIPAP and was given IV lasix with some improvement. RVP then results as RSV +. Patient had a CT scan which also showed pneumonia and pulmonary HTN. Admission to medicine was requested for further management.    #new afib:  echo w/ preserved EF  - EP consulted, no DCCV given resp issues  - cardiology following, plan for R/LHC when resp condition improves  - HR better controlled today  - c/w loprssor 50mg q8hr  - c/w heparin drip for [id number] 6 until [facility] with cardio  - spoke with Dr. Alamo of cardiology, for [facility] today    # Acute on chronic respiratory failure  # Acute HFpEF exacerbation with likely with Superimposed bacterial Pneumonia in setting of RSV  # Worsening PNA?  - WBC still trending up but pt remains afebrile and resp status improving (on NC)  - CT chest reads worsening middle lobe PNA  - sputum clx ordered for last several days, pt unable to bring up sputum  - repeat MRSA PCR negative  - ID consulted, WBC likely reactive to steroids  - d/c abx and monitor  - pt high risk for recurrent aspiration  - repeat blood clx NGTD  - tapering steroids   - levalbuterol and ipratropium q6hrs, symbicort BID   - IV Lasix on hold for JACQUES  - BIPAP qhs  - pulm following     # JACQUES  - likely combination of pre-renal and ATN  - lasix on hold  - improving today  - will pre-hydrate before and after cath  - nephrology consulted  - trend BMP    #Chronic pain  - continue home Pain meds of Oxycodone 60 mg q12h and Oxycodone 5mg q6h PRN    # HLD  - Atorvastatin for rosuvastatin    # COPD  - tapering steroids   - Symbicort, Spiriva, albuterol  - pulm following     #DM  #steroid induced hyperglycemia  - lantus/premeal insulin per endo  - lispro sliding scale    DVT prophylaxis: heparin ggt  Dispo: pending resp status optimization

## 2023-12-28 NOTE — PROGRESS NOTE ADULT - ASSESSMENT
67 y/o female PMH morbid obesity, HFpEF (EF 55-60% 8/2023), on home O2 4L, pulmonary HTN, was on Eliquis for presumed PE and severe pulmonary hypertension (PASP of 70 mmHg). super morbid obesity, asthma, CKD3, IDDM2, uterine cancer s/p MEGAN, DVT LLE (2004), chronic leg edema 2/2 venous stasis, spinal stenosis with chronic back pain who presents with midsternal chest pressure and shortness of breath. Patient converted to AFib on 12/21.     Acute on chronic HFpEF, NYHA III, ACC C  Bacterial pneumonia  Pulmonary HYN, Severe ( PASP 70 mmHg)  Super morbid obesity  CKD stage 3  insulin dependent diabetes mellitus  ?presumed PE  hx of spinal stenosis with chronic back pain  hx of LLE DVT 2004  chronic lower extremity edema  venous stasis dermatitis  asthma  hypoxemic respiratory failure        ID and hospitalist medicine evals noted.  Tele checked and in AFib with controlled rate  Improvement in respiratory status  Improvement in edema    Timing of cath to be determined. Renal function improved since yesterday. Likely today vs. tomorrow pending scheduling.  Patient aware, understands and is appreciative.    Discussed with above teams + interventional cardiology.    Pending R+LHC  Ultimately patient needs uptitration of GDMT and to have weight loss reduction for longterm cardiovascular health. She agrees.    We will follow results of Cath when available  69 y/o female PMH morbid obesity, HFpEF (EF 55-60% 8/2023), on home O2 4L, pulmonary HTN, was on Eliquis for presumed PE and severe pulmonary hypertension (PASP of 70 mmHg). super morbid obesity, asthma, CKD3, IDDM2, uterine cancer s/p MEGAN, DVT LLE (2004), chronic leg edema 2/2 venous stasis, spinal stenosis with chronic back pain who presents with midsternal chest pressure and shortness of breath. Patient converted to AFib on 12/21.     Acute on chronic HFpEF, NYHA III, ACC C  Bacterial pneumonia  Pulmonary HYN, Severe ( PASP 70 mmHg)  Super morbid obesity  CKD stage 3  insulin dependent diabetes mellitus  ?presumed PE  hx of spinal stenosis with chronic back pain  hx of LLE DVT 2004  chronic lower extremity edema  venous stasis dermatitis  asthma  hypoxemic respiratory failure        ID and hospitalist medicine evals noted.  Tele checked and in AFib with controlled rate  Improvement in respiratory status  Improvement in edema    Timing of cath to be determined. Renal function improved since yesterday. Likely today vs. tomorrow pending scheduling.  Patient aware, understands and is appreciative.    Discussed with above teams + interventional cardiology.    Pending R+LHC  Ultimately patient needs uptitration of GDMT and to have weight loss reduction for longterm cardiovascular health. She agrees.    We will follow results of Cath when available

## 2023-12-28 NOTE — PROGRESS NOTE ADULT - SUBJECTIVE AND OBJECTIVE BOX
[facility] Division of Medicine    SUBJECTIVE / OVERNIGHT EVENTS: Pt seen at the bedside. Endorses feeling better today.  Patient denies chest pain, SOB, abd pain, N/V, fever, chills, dysuria or any other complaints. All remainder ROS negative.     MEDICATIONS  (STANDING):  acetylcysteine 20%  Inhalation 4 milliLiter(s) Inhalation daily  atorvastatin 80 milliGRAM(s) Oral at bedtime  budesonide  80 MICROgram(s)/formoterol 4.5 MICROgram(s) Inhaler 2 Puff(s) Inhalation two times a day  chlorhexidine 2% Cloths 1 Application(s) Topical <User Schedule>  clotrimazole 1% Cream 1 Application(s) Topical two times a day  dextrose 5%. 1000 milliLiter(s) (100 mL/Hr) IV Continuous <Continuous>  dextrose 5%. 1000 milliLiter(s) (50 mL/Hr) IV Continuous <Continuous>  dextrose 50% Injectable 25 Gram(s) IV Push once  dextrose 50% Injectable 12.5 Gram(s) IV Push once  dextrose 50% Injectable 25 Gram(s) IV Push once  famotidine    Tablet 20 milliGRAM(s) Oral daily  glucagon  Injectable 1 milliGRAM(s) IntraMuscular once  heparin  Infusion.  Unit(s)/Hr (24 mL/Hr) IV Continuous <Continuous>  insulin glargine Injectable (LANTUS) 50 Unit(s) SubCutaneous at bedtime  insulin glargine Injectable (LANTUS) 50 Unit(s) SubCutaneous every morning  insulin lispro (ADMELOG) corrective regimen sliding scale   SubCutaneous three times a day before meals  insulin lispro Injectable (ADMELOG) 40 Unit(s) SubCutaneous three times a day before meals  ipratropium    for Nebulization 500 MICROGram(s) Nebulizer every 6 hours  lactulose Syrup 20 Gram(s) Oral daily  levalbuterol Inhalation 0.63 milliGRAM(s) Inhalation every 6 hours  metoprolol tartrate 75 milliGRAM(s) Oral every 8 hours  nystatin Powder 1 Application(s) Topical two times a day  oxyCODONE  ER Tablet 60 milliGRAM(s) Oral every 12 hours  polyethylene glycol 3350 17 Gram(s) Oral daily  senna 2 Tablet(s) Oral at bedtime    MEDICATIONS  (PRN):  acetaminophen     Tablet .. 650 milliGRAM(s) Oral every 6 hours PRN Temp greater or equal to 38C (100.4F), Mild Pain (1 - 3)  albuterol    90 MICROgram(s) HFA Inhaler 2 Puff(s) Inhalation every 2 hours PRN Shortness of Breath and/or Wheezing  dextrose Oral Gel 15 Gram(s) Oral once PRN Blood Glucose LESS THAN 70 milliGRAM(s)/deciliter  diphenhydrAMINE 25 milliGRAM(s) Oral every 6 hours PRN Rash and/or Itching  heparin   Injectable 5000 Unit(s) IV Push every 6 hours PRN For aPTT between 40 - 57  heparin   Injectable 29382 Unit(s) IV Push every 6 hours PRN For aPTT less than 40  hydrALAZINE Injectable 10 milliGRAM(s) IV Push every 4 hours PRN for systolic bp > 160  HYDROmorphone  Injectable 1 milliGRAM(s) IV Push every 4 hours PRN breakthrough  pain  melatonin 3 milliGRAM(s) Oral at bedtime PRN Insomnia  metoprolol tartrate Injectable 5 milliGRAM(s) IV Push every 6 hours PRN heart rate sustaining greater than 130  ondansetron Injectable 4 milliGRAM(s) IV Push every 8 hours PRN Nausea and/or Vomiting  oxyCODONE    IR 5 milliGRAM(s) Oral every 6 hours PRN Moderate Pain (4 - 6)      I&O's Summary    27 Dec 2023 07:01  -  28 Dec 2023 07:00  --------------------------------------------------------  IN: 1360 mL / OUT: 2750 mL / NET: -1390 mL        PHYSICAL EXAM:  Vital Signs Last 24 Hrs  T(C): 36.7 (28 Dec 2023 12:58), Max: 37 (28 Dec 2023 05:11)  T(F): 98 (28 Dec 2023 12:58), Max: 98.6 (28 Dec 2023 05:11)  HR: 113 (28 Dec 2023 12:58) (100 - 124)  BP: 113/66 (28 Dec 2023 12:58) (103/67 - 136/71)  BP(mean): --  RR: 18 (28 Dec 2023 12:58) (17 - 20)  SpO2: 99% (28 Dec 2023 12:58) (95% - 100%)    Parameters below as of 28 Dec 2023 12:58  Patient On (Oxygen Delivery Method): nasal cannula  O2 Flow (L/min): 2      GENERAL: not in acute distress, morbidly obese   HEENT:  Clear conjunctiva, PERRL, moist oral mucosa  RESP:  Non-labored breathing pattern, course lungs sounds on the right , no wheezes or crackles appreciated  CV: Regular rate and rhythm, no murmurs appreciated, +1 lower extremity edema, peripheral pulses are 2+ bilaterally  GI: Soft, non-tender, non-distended  NEURO: Awake, alert, conversant, upper and lower extremity strength and light touch sensation grossly intact but pt with significant deconditioning   PSYCH: Calm, cooperative, A&Ox3  SKIN: No rash or lesions, warm and         LABS:                        13.5   31.44 )-----------( 259      ( 28 Dec 2023 04:37 )             41.7     12-28    134<L>  |  94<L>  |  75.1<H>  ----------------------------<  219<H>  5.6<H>   |  30.0<H>  |  1.66<H>    Ca    9.2      28 Dec 2023 04:37  Mg     2.6     12-27      PTT - ( 28 Dec 2023 04:37 )  PTT:58.8 sec      Urinalysis Basic - ( 28 Dec 2023 04:37 )    Color: x / Appearance: x / SG: x / pH: x  Gluc: 219 mg/dL / Ketone: x  / Bili: x / Urobili: x   Blood: x / Protein: x / Nitrite: x   Leuk Esterase: x / RBC: x / WBC x   Sq Epi: x / Non Sq Epi: x / Bacteria: x        Culture - Blood (collected 26 Dec 2023 09:31)  Source: .Blood Blood  Preliminary Report (27 Dec 2023 17:02):    No growth at 24 hours    Culture - Blood (collected 26 Dec 2023 09:21)  Source: .Blood Blood  Preliminary Report (27 Dec 2023 17:02):    No growth at 24 hours      CAPILLARY BLOOD GLUCOSE      POCT Blood Glucose.: 211 mg/dL (28 Dec 2023 12:16)  POCT Blood Glucose.: 219 mg/dL (28 Dec 2023 08:19)  POCT Blood Glucose.: 260 mg/dL (27 Dec 2023 21:55)  POCT Blood Glucose.: 195 mg/dL (27 Dec 2023 17:25)      IMAGING:                                   [facility] Division of Medicine    SUBJECTIVE / OVERNIGHT EVENTS: Pt seen at the bedside. Endorses feeling better today.  Patient denies chest pain, SOB, abd pain, N/V, fever, chills, dysuria or any other complaints. All remainder ROS negative.     MEDICATIONS  (STANDING):  acetylcysteine 20%  Inhalation 4 milliLiter(s) Inhalation daily  atorvastatin 80 milliGRAM(s) Oral at bedtime  budesonide  80 MICROgram(s)/formoterol 4.5 MICROgram(s) Inhaler 2 Puff(s) Inhalation two times a day  chlorhexidine 2% Cloths 1 Application(s) Topical <User Schedule>  clotrimazole 1% Cream 1 Application(s) Topical two times a day  dextrose 5%. 1000 milliLiter(s) (100 mL/Hr) IV Continuous <Continuous>  dextrose 5%. 1000 milliLiter(s) (50 mL/Hr) IV Continuous <Continuous>  dextrose 50% Injectable 25 Gram(s) IV Push once  dextrose 50% Injectable 12.5 Gram(s) IV Push once  dextrose 50% Injectable 25 Gram(s) IV Push once  famotidine    Tablet 20 milliGRAM(s) Oral daily  glucagon  Injectable 1 milliGRAM(s) IntraMuscular once  heparin  Infusion.  Unit(s)/Hr (24 mL/Hr) IV Continuous <Continuous>  insulin glargine Injectable (LANTUS) 50 Unit(s) SubCutaneous at bedtime  insulin glargine Injectable (LANTUS) 50 Unit(s) SubCutaneous every morning  insulin lispro (ADMELOG) corrective regimen sliding scale   SubCutaneous three times a day before meals  insulin lispro Injectable (ADMELOG) 40 Unit(s) SubCutaneous three times a day before meals  ipratropium    for Nebulization 500 MICROGram(s) Nebulizer every 6 hours  lactulose Syrup 20 Gram(s) Oral daily  levalbuterol Inhalation 0.63 milliGRAM(s) Inhalation every 6 hours  metoprolol tartrate 75 milliGRAM(s) Oral every 8 hours  nystatin Powder 1 Application(s) Topical two times a day  oxyCODONE  ER Tablet 60 milliGRAM(s) Oral every 12 hours  polyethylene glycol 3350 17 Gram(s) Oral daily  senna 2 Tablet(s) Oral at bedtime    MEDICATIONS  (PRN):  acetaminophen     Tablet .. 650 milliGRAM(s) Oral every 6 hours PRN Temp greater or equal to 38C (100.4F), Mild Pain (1 - 3)  albuterol    90 MICROgram(s) HFA Inhaler 2 Puff(s) Inhalation every 2 hours PRN Shortness of Breath and/or Wheezing  dextrose Oral Gel 15 Gram(s) Oral once PRN Blood Glucose LESS THAN 70 milliGRAM(s)/deciliter  diphenhydrAMINE 25 milliGRAM(s) Oral every 6 hours PRN Rash and/or Itching  heparin   Injectable 5000 Unit(s) IV Push every 6 hours PRN For aPTT between 40 - 57  heparin   Injectable 71324 Unit(s) IV Push every 6 hours PRN For aPTT less than 40  hydrALAZINE Injectable 10 milliGRAM(s) IV Push every 4 hours PRN for systolic bp > 160  HYDROmorphone  Injectable 1 milliGRAM(s) IV Push every 4 hours PRN breakthrough  pain  melatonin 3 milliGRAM(s) Oral at bedtime PRN Insomnia  metoprolol tartrate Injectable 5 milliGRAM(s) IV Push every 6 hours PRN heart rate sustaining greater than 130  ondansetron Injectable 4 milliGRAM(s) IV Push every 8 hours PRN Nausea and/or Vomiting  oxyCODONE    IR 5 milliGRAM(s) Oral every 6 hours PRN Moderate Pain (4 - 6)      I&O's Summary    27 Dec 2023 07:01  -  28 Dec 2023 07:00  --------------------------------------------------------  IN: 1360 mL / OUT: 2750 mL / NET: -1390 mL        PHYSICAL EXAM:  Vital Signs Last 24 Hrs  T(C): 36.7 (28 Dec 2023 12:58), Max: 37 (28 Dec 2023 05:11)  T(F): 98 (28 Dec 2023 12:58), Max: 98.6 (28 Dec 2023 05:11)  HR: 113 (28 Dec 2023 12:58) (100 - 124)  BP: 113/66 (28 Dec 2023 12:58) (103/67 - 136/71)  BP(mean): --  RR: 18 (28 Dec 2023 12:58) (17 - 20)  SpO2: 99% (28 Dec 2023 12:58) (95% - 100%)    Parameters below as of 28 Dec 2023 12:58  Patient On (Oxygen Delivery Method): nasal cannula  O2 Flow (L/min): 2      GENERAL: not in acute distress, morbidly obese   HEENT:  Clear conjunctiva, PERRL, moist oral mucosa  RESP:  Non-labored breathing pattern, course lungs sounds on the right , no wheezes or crackles appreciated  CV: Regular rate and rhythm, no murmurs appreciated, +1 lower extremity edema, peripheral pulses are 2+ bilaterally  GI: Soft, non-tender, non-distended  NEURO: Awake, alert, conversant, upper and lower extremity strength and light touch sensation grossly intact but pt with significant deconditioning   PSYCH: Calm, cooperative, A&Ox3  SKIN: No rash or lesions, warm and         LABS:                        13.5   31.44 )-----------( 259      ( 28 Dec 2023 04:37 )             41.7     12-28    134<L>  |  94<L>  |  75.1<H>  ----------------------------<  219<H>  5.6<H>   |  30.0<H>  |  1.66<H>    Ca    9.2      28 Dec 2023 04:37  Mg     2.6     12-27      PTT - ( 28 Dec 2023 04:37 )  PTT:58.8 sec      Urinalysis Basic - ( 28 Dec 2023 04:37 )    Color: x / Appearance: x / SG: x / pH: x  Gluc: 219 mg/dL / Ketone: x  / Bili: x / Urobili: x   Blood: x / Protein: x / Nitrite: x   Leuk Esterase: x / RBC: x / WBC x   Sq Epi: x / Non Sq Epi: x / Bacteria: x        Culture - Blood (collected 26 Dec 2023 09:31)  Source: .Blood Blood  Preliminary Report (27 Dec 2023 17:02):    No growth at 24 hours    Culture - Blood (collected 26 Dec 2023 09:21)  Source: .Blood Blood  Preliminary Report (27 Dec 2023 17:02):    No growth at 24 hours      CAPILLARY BLOOD GLUCOSE      POCT Blood Glucose.: 211 mg/dL (28 Dec 2023 12:16)  POCT Blood Glucose.: 219 mg/dL (28 Dec 2023 08:19)  POCT Blood Glucose.: 260 mg/dL (27 Dec 2023 21:55)  POCT Blood Glucose.: 195 mg/dL (27 Dec 2023 17:25)      IMAGING:

## 2023-12-28 NOTE — CONSULT NOTE ADULT - SUBJECTIVE AND OBJECTIVE BOX
[facility]                                                INFECTIOUS DISEASES  =======================================================                     Cal George#   Martin Huber MD#   Esther Ceballos MD*                           Noreen Marin MD*   Sheba Miller MD*            Diplomates American Board of Internal Medicine & Infectious Diseases                  # [place] Office - Appt - Tel  [phone] Fax [phone]                * [place] Office - Appt - Tel [phone] Fax [phone]                                  Hospital Consult line:  [phone]  =======================================================      [id number]  AGUEDA LUIS   HPI:  69 yo female with HTN, HLD, DM2, HFpEF, CKD III, DVT, Uterine CA, COPD on home o2 who presented with complaints of shortness of breath. Patient reports that she has been sick all week and was recently started on Vantin and steroids while at the nursing home. Patient reports that her dyspnea just worsened and she told the nursing home to send her in for an evaluation. While in the ED, patient was placed on BIPAP and was given IV lasix with some improvement. RVP then results as RSV +. Patient had a CT scan which also showed pneumonia and pulmonary HTN. Admission to medicine was requested for further management. (16 Dec 2023 16:12)          I have personally reviewed the labs and data; pertinent labs and data are listed in this note; please see below.   =======================================================  Past Medical & Surgical Hx:  =====================  PAST MEDICAL & SURGICAL HISTORY:  Diabetes Mellitus Type II      HTN (Hypertension)      Endometrial Hyperplasia      Cervical Stenosis of Spine      Spinal Stenosis, Lumbar      Deep Vein Thrombosis (DVT)  Left leg, 2004, treated and resolved      Dyslipidemia      Cataract      Morbid Obesity      Vitamin D deficiency      Insomnia      CKD (chronic kidney disease)  ~ III      Congestive heart failure  ~ HFpEF      Uterine cancer      Asthma      On home oxygen therapy      Gait difficulty  ~ u ses walker      Cataract extracted with lens implant 1998  Right      C Section 1994      Cholecystectomy/appendectomy @ age 25      D&C x2 1980's      D&C 2008  hysteroscopy, endometrial hyperplasia, 2009      Cervical Spinal Stenosis surgery x2 (01/2002, 7/2002)      Tonsillectomy as a child      Endometrial biopsy 12/02/09      H/O laser iridotomy  left eye, 2016      H/O colonoscopy  1998      S/P total abdominal hysterectomy and bilateral salpingo-oophorectomy      S/P appendectomy      S/P cholecystectomy        Problem List:  ==========  HEALTH ISSUES - PROBLEM Dx:  Acute on chronic heart failure with preserved ejection fraction (HFpEF)    Morbid obesity    Respiratory failure with hypercapnia    RSV infection    Pulmonary hypertension    Atrial fibrillation          Social Hx:  =======  no toxic habits currently    FAMILY HISTORY:  Family history of pancreatic cancer    Family history of bladder cancer    Family history of lung cancer (Grandparent)    no significant family history of immunosuppressive disorders in mother or father   =======================================================    REVIEW OF SYSTEMS:  CONSTITUTIONAL:  No Fever or chills  HEENT:  No diplopia or blurred vision.  No earache, sore throat or runny nose.  CARDIOVASCULAR:  No pressure, squeezing, strangling, tightness, heaviness or aching about the chest, neck, axilla or epigastrium.  RESPIRATORY:  No cough, shortness of breath  GASTROINTESTINAL:  No nausea, vomiting or diarrhea.  GENITOURINARY:  No dysuria, frequency or urgency. No Blood in urine  MUSCULOSKELETAL:  no joint aches, no muscle pain  SKIN:  No change in skin, hair or nails.  NEUROLOGIC:  No Headaches, seizures or weakness.  PSYCHIATRIC:  No disorder of thought or mood.  ENDOCRINE:  No heat or cold intolerance  HEMATOLOGICAL:  No easy bruising or bleeding.    =======================================================  Allergies    wool- rash, itch (Other)  adhesives (Rash)  latex (Rash)  Bactrim (Flushing)    Intolerances    Antibiotics:    Other medications:  acetylcysteine 20%  Inhalation 4 milliLiter(s) Inhalation daily  atorvastatin 80 milliGRAM(s) Oral at bedtime  budesonide  80 MICROgram(s)/formoterol 4.5 MICROgram(s) Inhaler 2 Puff(s) Inhalation two times a day  chlorhexidine 2% Cloths 1 Application(s) Topical <User Schedule>  clotrimazole 1% Cream 1 Application(s) Topical two times a day  dextrose 5%. 1000 milliLiter(s) IV Continuous <Continuous>  dextrose 5%. 1000 milliLiter(s) IV Continuous <Continuous>  dextrose 50% Injectable 25 Gram(s) IV Push once  dextrose 50% Injectable 12.5 Gram(s) IV Push once  dextrose 50% Injectable 25 Gram(s) IV Push once  famotidine    Tablet 20 milliGRAM(s) Oral daily  glucagon  Injectable 1 milliGRAM(s) IntraMuscular once  heparin  Infusion.  Unit(s)/Hr IV Continuous <Continuous>  insulin glargine Injectable (LANTUS) 50 Unit(s) SubCutaneous at bedtime  insulin glargine Injectable (LANTUS) 50 Unit(s) SubCutaneous every morning  insulin lispro (ADMELOG) corrective regimen sliding scale   SubCutaneous three times a day before meals  insulin lispro Injectable (ADMELOG) 40 Unit(s) SubCutaneous three times a day before meals  ipratropium    for Nebulization 500 MICROGram(s) Nebulizer every 6 hours  lactulose Syrup 20 Gram(s) Oral daily  levalbuterol Inhalation 0.63 milliGRAM(s) Inhalation every 6 hours  metoprolol tartrate 75 milliGRAM(s) Oral every 8 hours  nystatin Powder 1 Application(s) Topical two times a day  oxyCODONE  ER Tablet 60 milliGRAM(s) Oral every 12 hours  polyethylene glycol 3350 17 Gram(s) Oral daily  senna 2 Tablet(s) Oral at bedtime     doxycycline IVPB   100 mL/Hr IV Intermittent (12-16-23 @ 23:35)    doxycycline IVPB   100 mL/Hr IV Intermittent (12-17-23 @ 10:37)   100 mL/Hr IV Intermittent (12-17-23 @ 22:55)    meropenem Injectable   1000 milliGRAM(s) IV Push (12-26-23 @ 13:03)   1000 milliGRAM(s) IV Push (12-26-23 @ 21:34)   1000 milliGRAM(s) IV Push (12-27-23 @ 05:22)   1000 milliGRAM(s) IV Push (12-27-23 @ 13:15)   1000 milliGRAM(s) IV Push (12-27-23 @ 21:46)   1000 milliGRAM(s) IV Push (12-28-23 @ 06:49)    piperacillin/tazobactam IVPB.   200 mL/Hr IV Intermittent (12-16-23 @ 15:21)    piperacillin/tazobactam IVPB.-   25 mL/Hr IV Intermittent (12-16-23 @ 17:45)    piperacillin/tazobactam IVPB..   25 mL/Hr IV Intermittent (12-23-23 @ 22:08)   25 mL/Hr IV Intermittent (12-24-23 @ 06:04)   25 mL/Hr IV Intermittent (12-24-23 @ 14:19)   25 mL/Hr IV Intermittent (12-24-23 @ 22:10)   25 mL/Hr IV Intermittent (12-25-23 @ 05:43)   25 mL/Hr IV Intermittent (12-25-23 @ 13:54)   25 mL/Hr IV Intermittent (12-25-23 @ 22:33)   25 mL/Hr IV Intermittent (12-26-23 @ 05:41)    piperacillin/tazobactam IVPB..   25 mL/Hr IV Intermittent (12-17-23 @ 06:42)   25 mL/Hr IV Intermittent (12-17-23 @ 15:03)   25 mL/Hr IV Intermittent (12-17-23 @ 22:55)   25 mL/Hr IV Intermittent (12-18-23 @ 06:02)   25 mL/Hr IV Intermittent (12-18-23 @ 14:19)   25 mL/Hr IV Intermittent (12-18-23 @ 22:10)   25 mL/Hr IV Intermittent (12-19-23 @ 07:04)   25 mL/Hr IV Intermittent (12-19-23 @ 13:11)   25 mL/Hr IV Intermittent (12-19-23 @ 23:02)   25 mL/Hr IV Intermittent (12-20-23 @ 06:23)   25 mL/Hr IV Intermittent (12-20-23 @ 13:07)   25 mL/Hr IV Intermittent (12-20-23 @ 22:17)   25 mL/Hr IV Intermittent (12-21-23 @ 06:34)   25 mL/Hr IV Intermittent (12-21-23 @ 13:12)   25 mL/Hr IV Intermittent (12-21-23 @ 21:59)   25 mL/Hr IV Intermittent (12-22-23 @ 05:35)   25 mL/Hr IV Intermittent (12-22-23 @ 15:38)   25 mL/Hr IV Intermittent (12-22-23 @ 22:00)   25 mL/Hr IV Intermittent (12-23-23 @ 06:03)    vancomycin  IVPB   300 mL/Hr IV Intermittent (12-26-23 @ 13:22)   300 mL/Hr IV Intermittent (12-27-23 @ 08:28)      ======================================================  Physical Exam:  ============  T(F): 98.5 (28 Dec 2023 09:57), Max: 98.6 (28 Dec 2023 05:11)  HR: 100 (28 Dec 2023 10:48)  BP: 107/43 (28 Dec 2023 09:57)  RR: 17 (28 Dec 2023 09:57)  SpO2: 98% (28 Dec 2023 10:48) (95% - 100%)  temp max in last 48H T(F): , Max: 99.1 (12-26-23 @ 16:00)    General:  No acute distress.  Eye: Pupils are equal, round and reactive to light, Normal conjunctiva.  HENT: Normocephalic, Oral mucosa is moist, No pharyngeal erythema, No sinus tenderness.  Neck: Supple, No lymphadenopathy.  Respiratory: Lungs are clear to auscultation, Respirations are non-labored.  Cardiovascular: Normal rate, Regular rhythm, s1 + s2  Gastrointestinal: Soft, Non-tender, Non-distended, Normal bowel sounds.  Genitourinary: No costovertebral angle tenderness.  Lymphatics: No lymphadenopathy neck,   Musculoskeletal: Normal range of motion, Normal strength.  Integumentary: No rash.  Neurologic: Alert, Oriented, No focal deficits  Psychiatric: Appropriate mood & affect.    =======================================================  Labs:                        13.5   31.44 )-----------( 259      ( 28 Dec 2023 04:37 )             41.7     12-28    134<L>  |  94<L>  |  75.1<H>  ----------------------------<  219<H>  5.6<H>   |  30.0<H>  |  1.66<H>    Ca    9.2      28 Dec 2023 04:37  Mg     2.6     12-27        Culture - Blood (collected 12-26-23 @ 09:31)  Source: .Blood Blood  Preliminary Report (12-27-23 @ 17:02):    No growth at 24 hours    Culture - Blood (collected 12-26-23 @ 09:21)  Source: .Blood Blood  Preliminary Report (12-27-23 @ 17:02):    No growth at 24 hours    Culture - Blood (collected 12-16-23 @ 15:20)  Source: .Blood Blood  Final Report (12-21-23 @ 22:00):    No growth at 5 days    Culture - Blood (collected 12-16-23 @ 15:10)  Source: .Blood Blood  Final Report (12-21-23 @ 22:00):    No growth at 5 days    Culture - Urine (collected 12-16-23 @ 12:30)  Source: Clean Catch Clean Catch (Midstream)  Final Report (12-17-23 @ 19:16):    No growth    Culture - Urine (collected 11-24-23 @ 14:48)  Source: Clean Catch Clean Catch (Midstream)  Final Report (11-25-23 @ 17:32):    <10,000 CFU/mL Normal Urogenital Vicente    Culture - Urine (collected 09-30-23 @ 05:00)  Source: Clean Catch Clean Catch (Midstream)  Final Report (10-03-23 @ 17:50):    10,000 - 49,000 CFU/mL Pseudomonas aeruginosa    10,000 - 49,000 CFU/mL Enterococcus faecium (vancomycin resistant)  Organism: Pseudomonas aeruginosa  Enterococcus faecium (vancomycin resistant) (10-03-23 @ 17:50)  Organism: Enterococcus faecium (vancomycin resistant) (10-03-23 @ 17:50)    Sensitivities:      Method Type: DARREL      -  Ampicillin: R >8 Predicts results to ampicillin/sulbactam, amoxacillin-clavulanate and  piperacillin-tazobactam.      -  Ciprofloxacin: R >2      -  Daptomycin: SDD 4 The breakpoint for SDD (susceptible dose dependent)is based on a dosage regimen of 8-12 mg/kg administered every 24 h in adults and is intended for serious infections due to E. faecium. Consultation with an infectious diseases specialist is recommended.      -  Levofloxacin: R >4      -  Linezolid: S 2      -  Nitrofurantoin: S <=32 Should not be used to treat pyelonephritis.      -  Tetracycline: R >8      -  Vancomycin: R >16  Organism: Pseudomonas aeruginosa (10-03-23 @ 17:50)    Sensitivities:      Method Type: DARREL      -  Amikacin: S <=16      -  Aztreonam: S <=4      -  Cefepime: S <=2      -  Ceftazidime: S 4      -  Ciprofloxacin: S <=0.25      -  Gentamicin: S <=2      -  Imipenem: S 2      -  Levofloxacin: S <=0.5      -  Meropenem: S <=1      -  Piperacillin/Tazobactam: S <=8      -  Tobramycin: S <=2    Culture - Urine (collected 09-23-23 @ 12:30)  Source: Catheterized Catheterized  Final Report (09-27-23 @ 09:04):    >100,000 CFU/ml Proteus mirabilis    <10,000 CFU/ml Normal Urogenital vicente present  Organism: Proteus mirabilis (09-27-23 @ 09:04)  Organism: Proteus mirabilis (09-27-23 @ 09:04)    Sensitivities:      Method Type: DARREL      -  Amikacin: S <=16      -  Amoxicillin/Clavulanic Acid: S <=8/4      -  Ampicillin: S <=8 These ampicillin results predict results for amoxicillin      -  Ampicillin/Sulbactam: S <=4/2      -  Aztreonam: S <=4      -  Cefazolin: S <=2 For uncomplicated UTI with K. pneumoniae, E. coli, or P. mirablis: DARREL <=16 is sensitive and DARREL >=32 is resistant. This also predicts results for oral agents cefaclor, cefdinir, cefpodoxime, cefprozil, cefuroxime axetil, cephalexin and locarbef for uncomplicated UTI. Note that some isolates may be susceptible to these agents while testing resistant to cefazolin.      -  Cefepime: S <=2      -  Cefoxitin: S <=8      -  Ceftriaxone: S <=1      -  Cefuroxime: S <=4      -  Ciprofloxacin: R >2      -  Ertapenem: S <=0.5      -  Gentamicin: S <=2      -  Levofloxacin: R 4      -  Meropenem: S <=1      -  Nitrofurantoin: R >64 Should not be used to treat pyelonephritis      -  Piperacillin/Tazobactam: S <=8      -  Tobramycin: S <=2      -  Trimethoprim/Sulfamethoxazole: S 2/38    Culture - Urine (collected 09-17-23 @ 18:08)  Source: Clean Catch Clean Catch (Midstream)  Final Report (09-20-23 @ 07:41):    <10,000 CFU/mL Normal Urogenital Vicente    Culture - Blood (collected 10-12-22 @ 22:15)  Source: .Blood Blood-Venous  Final Report (10-18-22 @ 02:00):    No Growth Final    Culture - Blood (collected 10-12-22 @ 22:00)  Source: .Blood Blood-Peripheral  Final Report (10-18-22 @ 02:00):    No Growth Final    Culture - Urine (collected 10-12-22 @ 20:57)  Source: Clean Catch Clean Catch (Midstream)  Final Report (10-13-22 @ 23:42):    >=3 organisms. Probable collection contamination.    Culture - Urine (collected 01-25-22 @ 16:13)  Source: Catheterized Catheterized  Final Report (01-26-22 @ 16:09):    <10,000 CFU/mL Normal Urogenital Vicente       SARS-CoV-2: NotDetec (12-16-23 @ 10:18)                                                   [facility]                                                INFECTIOUS DISEASES  =======================================================                     Cal George#   Martin Huber MD#   Esther Ceballos MD*                           Noreen Marin MD*   Sheba Miller MD*            Diplomates American Board of Internal Medicine & Infectious Diseases                  # [place] Office - Appt - Tel  [phone] Fax [phone]                * [place] Office - Appt - Tel [phone] Fax [phone]                                  Hospital Consult line:  [phone]  =======================================================      [id number]  AGUEDA LUIS   HPI:  69 yo female with HTN, HLD, DM2, HFpEF, CKD III, DVT, Uterine CA, COPD on home o2 who presented with complaints of shortness of breath. Patient reports that she has been sick all week and was recently started on Vantin and steroids while at the nursing home. Patient reports that her dyspnea just worsened and she told the nursing home to send her in for an evaluation. While in the ED, patient was placed on BIPAP and was given IV lasix with some improvement. RVP then results as RSV +. Patient had a CT scan which also showed pneumonia and pulmonary HTN. Admission to medicine was requested for further management. (16 Dec 2023 16:12)          I have personally reviewed the labs and data; pertinent labs and data are listed in this note; please see below.   =======================================================  Past Medical & Surgical Hx:  =====================  PAST MEDICAL & SURGICAL HISTORY:  Diabetes Mellitus Type II      HTN (Hypertension)      Endometrial Hyperplasia      Cervical Stenosis of Spine      Spinal Stenosis, Lumbar      Deep Vein Thrombosis (DVT)  Left leg, 2004, treated and resolved      Dyslipidemia      Cataract      Morbid Obesity      Vitamin D deficiency      Insomnia      CKD (chronic kidney disease)  ~ III      Congestive heart failure  ~ HFpEF      Uterine cancer      Asthma      On home oxygen therapy      Gait difficulty  ~ u ses walker      Cataract extracted with lens implant 1998  Right      C Section 1994      Cholecystectomy/appendectomy @ age 25      D&C x2 1980's      D&C 2008  hysteroscopy, endometrial hyperplasia, 2009      Cervical Spinal Stenosis surgery x2 (01/2002, 7/2002)      Tonsillectomy as a child      Endometrial biopsy 12/02/09      H/O laser iridotomy  left eye, 2016      H/O colonoscopy  1998      S/P total abdominal hysterectomy and bilateral salpingo-oophorectomy      S/P appendectomy      S/P cholecystectomy        Problem List:  ==========  HEALTH ISSUES - PROBLEM Dx:  Acute on chronic heart failure with preserved ejection fraction (HFpEF)    Morbid obesity    Respiratory failure with hypercapnia    RSV infection    Pulmonary hypertension    Atrial fibrillation          Social Hx:  =======  no toxic habits currently    FAMILY HISTORY:  Family history of pancreatic cancer    Family history of bladder cancer    Family history of lung cancer (Grandparent)    no significant family history of immunosuppressive disorders in mother or father   =======================================================    REVIEW OF SYSTEMS:  CONSTITUTIONAL:  No Fever or chills  HEENT:  No diplopia or blurred vision.  No earache, sore throat or runny nose.  CARDIOVASCULAR:  No pressure, squeezing, strangling, tightness, heaviness or aching about the chest, neck, axilla or epigastrium.  RESPIRATORY:  No cough, shortness of breath  GASTROINTESTINAL:  No nausea, vomiting or diarrhea.  GENITOURINARY:  No dysuria, frequency or urgency. No Blood in urine  MUSCULOSKELETAL:  no joint aches, no muscle pain  SKIN:  No change in skin, hair or nails.  NEUROLOGIC:  No Headaches, seizures or weakness.  PSYCHIATRIC:  No disorder of thought or mood.  ENDOCRINE:  No heat or cold intolerance  HEMATOLOGICAL:  No easy bruising or bleeding.    =======================================================  Allergies    wool- rash, itch (Other)  adhesives (Rash)  latex (Rash)  Bactrim (Flushing)    Intolerances    Antibiotics:    Other medications:  acetylcysteine 20%  Inhalation 4 milliLiter(s) Inhalation daily  atorvastatin 80 milliGRAM(s) Oral at bedtime  budesonide  80 MICROgram(s)/formoterol 4.5 MICROgram(s) Inhaler 2 Puff(s) Inhalation two times a day  chlorhexidine 2% Cloths 1 Application(s) Topical <User Schedule>  clotrimazole 1% Cream 1 Application(s) Topical two times a day  dextrose 5%. 1000 milliLiter(s) IV Continuous <Continuous>  dextrose 5%. 1000 milliLiter(s) IV Continuous <Continuous>  dextrose 50% Injectable 25 Gram(s) IV Push once  dextrose 50% Injectable 12.5 Gram(s) IV Push once  dextrose 50% Injectable 25 Gram(s) IV Push once  famotidine    Tablet 20 milliGRAM(s) Oral daily  glucagon  Injectable 1 milliGRAM(s) IntraMuscular once  heparin  Infusion.  Unit(s)/Hr IV Continuous <Continuous>  insulin glargine Injectable (LANTUS) 50 Unit(s) SubCutaneous at bedtime  insulin glargine Injectable (LANTUS) 50 Unit(s) SubCutaneous every morning  insulin lispro (ADMELOG) corrective regimen sliding scale   SubCutaneous three times a day before meals  insulin lispro Injectable (ADMELOG) 40 Unit(s) SubCutaneous three times a day before meals  ipratropium    for Nebulization 500 MICROGram(s) Nebulizer every 6 hours  lactulose Syrup 20 Gram(s) Oral daily  levalbuterol Inhalation 0.63 milliGRAM(s) Inhalation every 6 hours  metoprolol tartrate 75 milliGRAM(s) Oral every 8 hours  nystatin Powder 1 Application(s) Topical two times a day  oxyCODONE  ER Tablet 60 milliGRAM(s) Oral every 12 hours  polyethylene glycol 3350 17 Gram(s) Oral daily  senna 2 Tablet(s) Oral at bedtime     doxycycline IVPB   100 mL/Hr IV Intermittent (12-16-23 @ 23:35)    doxycycline IVPB   100 mL/Hr IV Intermittent (12-17-23 @ 10:37)   100 mL/Hr IV Intermittent (12-17-23 @ 22:55)    meropenem Injectable   1000 milliGRAM(s) IV Push (12-26-23 @ 13:03)   1000 milliGRAM(s) IV Push (12-26-23 @ 21:34)   1000 milliGRAM(s) IV Push (12-27-23 @ 05:22)   1000 milliGRAM(s) IV Push (12-27-23 @ 13:15)   1000 milliGRAM(s) IV Push (12-27-23 @ 21:46)   1000 milliGRAM(s) IV Push (12-28-23 @ 06:49)    piperacillin/tazobactam IVPB.   200 mL/Hr IV Intermittent (12-16-23 @ 15:21)    piperacillin/tazobactam IVPB.-   25 mL/Hr IV Intermittent (12-16-23 @ 17:45)    piperacillin/tazobactam IVPB..   25 mL/Hr IV Intermittent (12-23-23 @ 22:08)   25 mL/Hr IV Intermittent (12-24-23 @ 06:04)   25 mL/Hr IV Intermittent (12-24-23 @ 14:19)   25 mL/Hr IV Intermittent (12-24-23 @ 22:10)   25 mL/Hr IV Intermittent (12-25-23 @ 05:43)   25 mL/Hr IV Intermittent (12-25-23 @ 13:54)   25 mL/Hr IV Intermittent (12-25-23 @ 22:33)   25 mL/Hr IV Intermittent (12-26-23 @ 05:41)    piperacillin/tazobactam IVPB..   25 mL/Hr IV Intermittent (12-17-23 @ 06:42)   25 mL/Hr IV Intermittent (12-17-23 @ 15:03)   25 mL/Hr IV Intermittent (12-17-23 @ 22:55)   25 mL/Hr IV Intermittent (12-18-23 @ 06:02)   25 mL/Hr IV Intermittent (12-18-23 @ 14:19)   25 mL/Hr IV Intermittent (12-18-23 @ 22:10)   25 mL/Hr IV Intermittent (12-19-23 @ 07:04)   25 mL/Hr IV Intermittent (12-19-23 @ 13:11)   25 mL/Hr IV Intermittent (12-19-23 @ 23:02)   25 mL/Hr IV Intermittent (12-20-23 @ 06:23)   25 mL/Hr IV Intermittent (12-20-23 @ 13:07)   25 mL/Hr IV Intermittent (12-20-23 @ 22:17)   25 mL/Hr IV Intermittent (12-21-23 @ 06:34)   25 mL/Hr IV Intermittent (12-21-23 @ 13:12)   25 mL/Hr IV Intermittent (12-21-23 @ 21:59)   25 mL/Hr IV Intermittent (12-22-23 @ 05:35)   25 mL/Hr IV Intermittent (12-22-23 @ 15:38)   25 mL/Hr IV Intermittent (12-22-23 @ 22:00)   25 mL/Hr IV Intermittent (12-23-23 @ 06:03)    vancomycin  IVPB   300 mL/Hr IV Intermittent (12-26-23 @ 13:22)   300 mL/Hr IV Intermittent (12-27-23 @ 08:28)      ======================================================  Physical Exam:  ============  T(F): 98.5 (28 Dec 2023 09:57), Max: 98.6 (28 Dec 2023 05:11)  HR: 100 (28 Dec 2023 10:48)  BP: 107/43 (28 Dec 2023 09:57)  RR: 17 (28 Dec 2023 09:57)  SpO2: 98% (28 Dec 2023 10:48) (95% - 100%)  temp max in last 48H T(F): , Max: 99.1 (12-26-23 @ 16:00)    General:  No acute distress.  Eye: Pupils are equal, round and reactive to light, Normal conjunctiva.  HENT: Normocephalic, Oral mucosa is moist, No pharyngeal erythema, No sinus tenderness.  Neck: Supple, No lymphadenopathy.  Respiratory: Lungs are clear to auscultation, Respirations are non-labored.  Cardiovascular: Normal rate, Regular rhythm, s1 + s2  Gastrointestinal: Soft, Non-tender, Non-distended, Normal bowel sounds.  Genitourinary: No costovertebral angle tenderness.  Lymphatics: No lymphadenopathy neck,   Musculoskeletal: Normal range of motion, Normal strength.  Integumentary: No rash.  Neurologic: Alert, Oriented, No focal deficits  Psychiatric: Appropriate mood & affect.    =======================================================  Labs:                        13.5   31.44 )-----------( 259      ( 28 Dec 2023 04:37 )             41.7     12-28    134<L>  |  94<L>  |  75.1<H>  ----------------------------<  219<H>  5.6<H>   |  30.0<H>  |  1.66<H>    Ca    9.2      28 Dec 2023 04:37  Mg     2.6     12-27        Culture - Blood (collected 12-26-23 @ 09:31)  Source: .Blood Blood  Preliminary Report (12-27-23 @ 17:02):    No growth at 24 hours    Culture - Blood (collected 12-26-23 @ 09:21)  Source: .Blood Blood  Preliminary Report (12-27-23 @ 17:02):    No growth at 24 hours    Culture - Blood (collected 12-16-23 @ 15:20)  Source: .Blood Blood  Final Report (12-21-23 @ 22:00):    No growth at 5 days    Culture - Blood (collected 12-16-23 @ 15:10)  Source: .Blood Blood  Final Report (12-21-23 @ 22:00):    No growth at 5 days    Culture - Urine (collected 12-16-23 @ 12:30)  Source: Clean Catch Clean Catch (Midstream)  Final Report (12-17-23 @ 19:16):    No growth    Culture - Urine (collected 11-24-23 @ 14:48)  Source: Clean Catch Clean Catch (Midstream)  Final Report (11-25-23 @ 17:32):    <10,000 CFU/mL Normal Urogenital Vicente    Culture - Urine (collected 09-30-23 @ 05:00)  Source: Clean Catch Clean Catch (Midstream)  Final Report (10-03-23 @ 17:50):    10,000 - 49,000 CFU/mL Pseudomonas aeruginosa    10,000 - 49,000 CFU/mL Enterococcus faecium (vancomycin resistant)  Organism: Pseudomonas aeruginosa  Enterococcus faecium (vancomycin resistant) (10-03-23 @ 17:50)  Organism: Enterococcus faecium (vancomycin resistant) (10-03-23 @ 17:50)    Sensitivities:      Method Type: DARREL      -  Ampicillin: R >8 Predicts results to ampicillin/sulbactam, amoxacillin-clavulanate and  piperacillin-tazobactam.      -  Ciprofloxacin: R >2      -  Daptomycin: SDD 4 The breakpoint for SDD (susceptible dose dependent)is based on a dosage regimen of 8-12 mg/kg administered every 24 h in adults and is intended for serious infections due to E. faecium. Consultation with an infectious diseases specialist is recommended.      -  Levofloxacin: R >4      -  Linezolid: S 2      -  Nitrofurantoin: S <=32 Should not be used to treat pyelonephritis.      -  Tetracycline: R >8      -  Vancomycin: R >16  Organism: Pseudomonas aeruginosa (10-03-23 @ 17:50)    Sensitivities:      Method Type: DARREL      -  Amikacin: S <=16      -  Aztreonam: S <=4      -  Cefepime: S <=2      -  Ceftazidime: S 4      -  Ciprofloxacin: S <=0.25      -  Gentamicin: S <=2      -  Imipenem: S 2      -  Levofloxacin: S <=0.5      -  Meropenem: S <=1      -  Piperacillin/Tazobactam: S <=8      -  Tobramycin: S <=2    Culture - Urine (collected 09-23-23 @ 12:30)  Source: Catheterized Catheterized  Final Report (09-27-23 @ 09:04):    >100,000 CFU/ml Proteus mirabilis    <10,000 CFU/ml Normal Urogenital vicente present  Organism: Proteus mirabilis (09-27-23 @ 09:04)  Organism: Proteus mirabilis (09-27-23 @ 09:04)    Sensitivities:      Method Type: DARREL      -  Amikacin: S <=16      -  Amoxicillin/Clavulanic Acid: S <=8/4      -  Ampicillin: S <=8 These ampicillin results predict results for amoxicillin      -  Ampicillin/Sulbactam: S <=4/2      -  Aztreonam: S <=4      -  Cefazolin: S <=2 For uncomplicated UTI with K. pneumoniae, E. coli, or P. mirablis: DARREL <=16 is sensitive and DARREL >=32 is resistant. This also predicts results for oral agents cefaclor, cefdinir, cefpodoxime, cefprozil, cefuroxime axetil, cephalexin and locarbef for uncomplicated UTI. Note that some isolates may be susceptible to these agents while testing resistant to cefazolin.      -  Cefepime: S <=2      -  Cefoxitin: S <=8      -  Ceftriaxone: S <=1      -  Cefuroxime: S <=4      -  Ciprofloxacin: R >2      -  Ertapenem: S <=0.5      -  Gentamicin: S <=2      -  Levofloxacin: R 4      -  Meropenem: S <=1      -  Nitrofurantoin: R >64 Should not be used to treat pyelonephritis      -  Piperacillin/Tazobactam: S <=8      -  Tobramycin: S <=2      -  Trimethoprim/Sulfamethoxazole: S 2/38    Culture - Urine (collected 09-17-23 @ 18:08)  Source: Clean Catch Clean Catch (Midstream)  Final Report (09-20-23 @ 07:41):    <10,000 CFU/mL Normal Urogenital Vicente    Culture - Blood (collected 10-12-22 @ 22:15)  Source: .Blood Blood-Venous  Final Report (10-18-22 @ 02:00):    No Growth Final    Culture - Blood (collected 10-12-22 @ 22:00)  Source: .Blood Blood-Peripheral  Final Report (10-18-22 @ 02:00):    No Growth Final    Culture - Urine (collected 10-12-22 @ 20:57)  Source: Clean Catch Clean Catch (Midstream)  Final Report (10-13-22 @ 23:42):    >=3 organisms. Probable collection contamination.    Culture - Urine (collected 01-25-22 @ 16:13)  Source: Catheterized Catheterized  Final Report (01-26-22 @ 16:09):    <10,000 CFU/mL Normal Urogenital Vicente       SARS-CoV-2: NotDetec (12-16-23 @ 10:18)                                                   [facility]                                                INFECTIOUS DISEASES  =======================================================                     Cal George#   Martin Huber MD#   Esther Ceballos MD*                           Noreen Marin MD*   Sheba Miller MD*            Diplomates American Board of Internal Medicine & Infectious Diseases                  # [place] Office - Appt - Tel  [phone] Fax [phone]                * [place] Office - Appt - Tel [phone] Fax [phone]                                  Hospital Consult line:  [phone]  =======================================================      [id number]  AGUEDA LUIS   HPI:  67 yo female with HTN, HLD, DM2, HFpEF, CKD III, DVT, Uterine CA, COPD on home o2 who presented with complaints of shortness of breath. Patient reports that she has been sick all week and was recently started on Vantin and steroids while at the nursing home. Patient reports that her dyspnea just worsened and she told the nursing home to send her in for an evaluation. While in the ED, patient was placed on BIPAP and was given IV lasix with some improvement. RVP then results as RSV +. Patient had a CT scan which also showed pneumonia and pulmonary HTN. Admission to medicine was requested for further management. (16 Dec 2023 16:12)    Patient being treated with antibiotics for pneumonia, noted to have uptrending WBC on steroids  Ct chest repeated reported worsening of right lung pneumonia    pt reports feeling overall improved since admission  + cough feels congested but unable to bring up any phlegm  no diarrhea  back to baseline o2            I have personally reviewed the labs and data; pertinent labs and data are listed in this note; please see below.   =======================================================  Past Medical & Surgical Hx:  =====================  PAST MEDICAL & SURGICAL HISTORY:  Diabetes Mellitus Type II      HTN (Hypertension)      Endometrial Hyperplasia      Cervical Stenosis of Spine      Spinal Stenosis, Lumbar      Deep Vein Thrombosis (DVT)  Left leg, 2004, treated and resolved      Dyslipidemia      Cataract      Morbid Obesity      Vitamin D deficiency      Insomnia      CKD (chronic kidney disease)  ~ III      Congestive heart failure  ~ HFpEF      Uterine cancer      Asthma      On home oxygen therapy      Gait difficulty  ~ u ses walker      Cataract extracted with lens implant 1998  Right      C Section 1994      Cholecystectomy/appendectomy @ age 25      D&C x2 1980's      D&C 2008  hysteroscopy, endometrial hyperplasia, 2009      Cervical Spinal Stenosis surgery x2 (01/2002, 7/2002)      Tonsillectomy as a child      Endometrial biopsy 12/02/09      H/O laser iridotomy  left eye, 2016      H/O colonoscopy  1998      S/P total abdominal hysterectomy and bilateral salpingo-oophorectomy      S/P appendectomy      S/P cholecystectomy        Problem List:  ==========  HEALTH ISSUES - PROBLEM Dx:  Acute on chronic heart failure with preserved ejection fraction (HFpEF)    Morbid obesity    Respiratory failure with hypercapnia    RSV infection    Pulmonary hypertension    Atrial fibrillation          Social Hx:  =======  no toxic habits currently    FAMILY HISTORY:  Family history of pancreatic cancer    Family history of bladder cancer    Family history of lung cancer (Grandparent)    no significant family history of immunosuppressive disorders in mother or father   =======================================================    REVIEW OF SYSTEMS:  CONSTITUTIONAL:  No Fever or chills  HEENT:  No diplopia or blurred vision.  No earache, sore throat or runny nose.  CARDIOVASCULAR:  No pressure, squeezing, strangling, tightness, heaviness or aching about the chest, neck, axilla or epigastrium.  RESPIRATORY:  No cough, shortness of breath  GASTROINTESTINAL:  No nausea, vomiting or diarrhea.  GENITOURINARY:  No dysuria, frequency or urgency. No Blood in urine  MUSCULOSKELETAL:  no joint aches, no muscle pain  SKIN:  No change in skin, hair or nails.  NEUROLOGIC:  No Headaches, seizures or weakness.  PSYCHIATRIC:  No disorder of thought or mood.  ENDOCRINE:  No heat or cold intolerance  HEMATOLOGICAL:  No easy bruising or bleeding.    =======================================================  Allergies    wool- rash, itch (Other)  adhesives (Rash)  latex (Rash)  Bactrim (Flushing)    Intolerances    Antibiotics:    Other medications:  acetylcysteine 20%  Inhalation 4 milliLiter(s) Inhalation daily  atorvastatin 80 milliGRAM(s) Oral at bedtime  budesonide  80 MICROgram(s)/formoterol 4.5 MICROgram(s) Inhaler 2 Puff(s) Inhalation two times a day  chlorhexidine 2% Cloths 1 Application(s) Topical <User Schedule>  clotrimazole 1% Cream 1 Application(s) Topical two times a day  dextrose 5%. 1000 milliLiter(s) IV Continuous <Continuous>  dextrose 5%. 1000 milliLiter(s) IV Continuous <Continuous>  dextrose 50% Injectable 25 Gram(s) IV Push once  dextrose 50% Injectable 12.5 Gram(s) IV Push once  dextrose 50% Injectable 25 Gram(s) IV Push once  famotidine    Tablet 20 milliGRAM(s) Oral daily  glucagon  Injectable 1 milliGRAM(s) IntraMuscular once  heparin  Infusion.  Unit(s)/Hr IV Continuous <Continuous>  insulin glargine Injectable (LANTUS) 50 Unit(s) SubCutaneous at bedtime  insulin glargine Injectable (LANTUS) 50 Unit(s) SubCutaneous every morning  insulin lispro (ADMELOG) corrective regimen sliding scale   SubCutaneous three times a day before meals  insulin lispro Injectable (ADMELOG) 40 Unit(s) SubCutaneous three times a day before meals  ipratropium    for Nebulization 500 MICROGram(s) Nebulizer every 6 hours  lactulose Syrup 20 Gram(s) Oral daily  levalbuterol Inhalation 0.63 milliGRAM(s) Inhalation every 6 hours  metoprolol tartrate 75 milliGRAM(s) Oral every 8 hours  nystatin Powder 1 Application(s) Topical two times a day  oxyCODONE  ER Tablet 60 milliGRAM(s) Oral every 12 hours  polyethylene glycol 3350 17 Gram(s) Oral daily  senna 2 Tablet(s) Oral at bedtime     doxycycline IVPB   100 mL/Hr IV Intermittent (12-16-23 @ 23:35)    doxycycline IVPB   100 mL/Hr IV Intermittent (12-17-23 @ 10:37)   100 mL/Hr IV Intermittent (12-17-23 @ 22:55)    meropenem Injectable   1000 milliGRAM(s) IV Push (12-26-23 @ 13:03)   1000 milliGRAM(s) IV Push (12-26-23 @ 21:34)   1000 milliGRAM(s) IV Push (12-27-23 @ 05:22)   1000 milliGRAM(s) IV Push (12-27-23 @ 13:15)   1000 milliGRAM(s) IV Push (12-27-23 @ 21:46)   1000 milliGRAM(s) IV Push (12-28-23 @ 06:49)    piperacillin/tazobactam IVPB.   200 mL/Hr IV Intermittent (12-16-23 @ 15:21)    piperacillin/tazobactam IVPB.-   25 mL/Hr IV Intermittent (12-16-23 @ 17:45)    piperacillin/tazobactam IVPB..   25 mL/Hr IV Intermittent (12-23-23 @ 22:08)   25 mL/Hr IV Intermittent (12-24-23 @ 06:04)   25 mL/Hr IV Intermittent (12-24-23 @ 14:19)   25 mL/Hr IV Intermittent (12-24-23 @ 22:10)   25 mL/Hr IV Intermittent (12-25-23 @ 05:43)   25 mL/Hr IV Intermittent (12-25-23 @ 13:54)   25 mL/Hr IV Intermittent (12-25-23 @ 22:33)   25 mL/Hr IV Intermittent (12-26-23 @ 05:41)    piperacillin/tazobactam IVPB..   25 mL/Hr IV Intermittent (12-17-23 @ 06:42)   25 mL/Hr IV Intermittent (12-17-23 @ 15:03)   25 mL/Hr IV Intermittent (12-17-23 @ 22:55)   25 mL/Hr IV Intermittent (12-18-23 @ 06:02)   25 mL/Hr IV Intermittent (12-18-23 @ 14:19)   25 mL/Hr IV Intermittent (12-18-23 @ 22:10)   25 mL/Hr IV Intermittent (12-19-23 @ 07:04)   25 mL/Hr IV Intermittent (12-19-23 @ 13:11)   25 mL/Hr IV Intermittent (12-19-23 @ 23:02)   25 mL/Hr IV Intermittent (12-20-23 @ 06:23)   25 mL/Hr IV Intermittent (12-20-23 @ 13:07)   25 mL/Hr IV Intermittent (12-20-23 @ 22:17)   25 mL/Hr IV Intermittent (12-21-23 @ 06:34)   25 mL/Hr IV Intermittent (12-21-23 @ 13:12)   25 mL/Hr IV Intermittent (12-21-23 @ 21:59)   25 mL/Hr IV Intermittent (12-22-23 @ 05:35)   25 mL/Hr IV Intermittent (12-22-23 @ 15:38)   25 mL/Hr IV Intermittent (12-22-23 @ 22:00)   25 mL/Hr IV Intermittent (12-23-23 @ 06:03)    vancomycin  IVPB   300 mL/Hr IV Intermittent (12-26-23 @ 13:22)   300 mL/Hr IV Intermittent (12-27-23 @ 08:28)      ======================================================  Physical Exam:  ============  T(F): 98.5 (28 Dec 2023 09:57), Max: 98.6 (28 Dec 2023 05:11)  HR: 100 (28 Dec 2023 10:48)  BP: 107/43 (28 Dec 2023 09:57)  RR: 17 (28 Dec 2023 09:57)  SpO2: 98% (28 Dec 2023 10:48) (95% - 100%)  temp max in last 48H T(F): , Max: 99.1 (12-26-23 @ 16:00)    General:  No acute distress. on o2, morbidly obese  Eye: Normal conjunctiva.  Neck: Supple, No lymphadenopathy.  Respiratory: coarse BS right>left to auscultation, Respirations are non-labored.  Cardiovascular: Normal rate, Regular rhythm, s1 + s2  Gastrointestinal: Soft, Non-tender, Non-distended, Normal bowel sounds.  Genitourinary: No costovertebral angle tenderness.  Integumentary: b/l pitting edema  Neurologic: Alert, Oriented, No focal deficits  Psychiatric: Appropriate mood & affect.    =======================================================  Labs:                        13.5   31.44 )-----------( 259      ( 28 Dec 2023 04:37 )             41.7     12-28    134<L>  |  94<L>  |  75.1<H>  ----------------------------<  219<H>  5.6<H>   |  30.0<H>  |  1.66<H>    Ca    9.2      28 Dec 2023 04:37  Mg     2.6     12-27        Culture - Blood (collected 12-26-23 @ 09:31)  Source: .Blood Blood  Preliminary Report (12-27-23 @ 17:02):    No growth at 24 hours    Culture - Blood (collected 12-26-23 @ 09:21)  Source: .Blood Blood  Preliminary Report (12-27-23 @ 17:02):    No growth at 24 hours    Culture - Blood (collected 12-16-23 @ 15:20)  Source: .Blood Blood  Final Report (12-21-23 @ 22:00):    No growth at 5 days    Culture - Blood (collected 12-16-23 @ 15:10)  Source: .Blood Blood  Final Report (12-21-23 @ 22:00):    No growth at 5 days    Culture - Urine (collected 12-16-23 @ 12:30)  Source: Clean Catch Clean Catch (Midstream)  Final Report (12-17-23 @ 19:16):    No growth    Culture - Urine (collected 11-24-23 @ 14:48)  Source: Clean Catch Clean Catch (Midstream)  Final Report (11-25-23 @ 17:32):    <10,000 CFU/mL Normal Urogenital Vicente    Culture - Urine (collected 09-30-23 @ 05:00)  Source: Clean Catch Clean Catch (Midstream)  Final Report (10-03-23 @ 17:50):    10,000 - 49,000 CFU/mL Pseudomonas aeruginosa    10,000 - 49,000 CFU/mL Enterococcus faecium (vancomycin resistant)  Organism: Pseudomonas aeruginosa  Enterococcus faecium (vancomycin resistant) (10-03-23 @ 17:50)  Organism: Enterococcus faecium (vancomycin resistant) (10-03-23 @ 17:50)    Sensitivities:      Method Type: DARREL      -  Ampicillin: R >8 Predicts results to ampicillin/sulbactam, amoxacillin-clavulanate and  piperacillin-tazobactam.      -  Ciprofloxacin: R >2      -  Daptomycin: SDD 4 The breakpoint for SDD (susceptible dose dependent)is based on a dosage regimen of 8-12 mg/kg administered every 24 h in adults and is intended for serious infections due to E. faecium. Consultation with an infectious diseases specialist is recommended.      -  Levofloxacin: R >4      -  Linezolid: S 2      -  Nitrofurantoin: S <=32 Should not be used to treat pyelonephritis.      -  Tetracycline: R >8      -  Vancomycin: R >16  Organism: Pseudomonas aeruginosa (10-03-23 @ 17:50)    Sensitivities:      Method Type: DARREL      -  Amikacin: S <=16      -  Aztreonam: S <=4      -  Cefepime: S <=2      -  Ceftazidime: S 4      -  Ciprofloxacin: S <=0.25      -  Gentamicin: S <=2      -  Imipenem: S 2      -  Levofloxacin: S <=0.5      -  Meropenem: S <=1      -  Piperacillin/Tazobactam: S <=8      -  Tobramycin: S <=2    Culture - Urine (collected 09-23-23 @ 12:30)  Source: Catheterized Catheterized  Final Report (09-27-23 @ 09:04):    >100,000 CFU/ml Proteus mirabilis    <10,000 CFU/ml Normal Urogenital vicente present  Organism: Proteus mirabilis (09-27-23 @ 09:04)  Organism: Proteus mirabilis (09-27-23 @ 09:04)    Sensitivities:      Method Type: DARREL      -  Amikacin: S <=16      -  Amoxicillin/Clavulanic Acid: S <=8/4      -  Ampicillin: S <=8 These ampicillin results predict results for amoxicillin      -  Ampicillin/Sulbactam: S <=4/2      -  Aztreonam: S <=4      -  Cefazolin: S <=2 For uncomplicated UTI with K. pneumoniae, E. coli, or P. mirablis: DARREL <=16 is sensitive and DARREL >=32 is resistant. This also predicts results for oral agents cefaclor, cefdinir, cefpodoxime, cefprozil, cefuroxime axetil, cephalexin and locarbef for uncomplicated UTI. Note that some isolates may be susceptible to these agents while testing resistant to cefazolin.      -  Cefepime: S <=2      -  Cefoxitin: S <=8      -  Ceftriaxone: S <=1      -  Cefuroxime: S <=4      -  Ciprofloxacin: R >2      -  Ertapenem: S <=0.5      -  Gentamicin: S <=2      -  Levofloxacin: R 4      -  Meropenem: S <=1      -  Nitrofurantoin: R >64 Should not be used to treat pyelonephritis      -  Piperacillin/Tazobactam: S <=8      -  Tobramycin: S <=2      -  Trimethoprim/Sulfamethoxazole: S 2/38    Culture - Urine (collected 09-17-23 @ 18:08)  Source: Clean Catch Clean Catch (Midstream)  Final Report (09-20-23 @ 07:41):    <10,000 CFU/mL Normal Urogenital Vicente    Culture - Blood (collected 10-12-22 @ 22:15)  Source: .Blood Blood-Venous  Final Report (10-18-22 @ 02:00):    No Growth Final    Culture - Blood (collected 10-12-22 @ 22:00)  Source: .Blood Blood-Peripheral  Final Report (10-18-22 @ 02:00):    No Growth Final    Culture - Urine (collected 10-12-22 @ 20:57)  Source: Clean Catch Clean Catch (Midstream)  Final Report (10-13-22 @ 23:42):    >=3 organisms. Probable collection contamination.    Culture - Urine (collected 01-25-22 @ 16:13)  Source: Catheterized Catheterized  Final Report (01-26-22 @ 16:09):    <10,000 CFU/mL Normal Urogenital Vicente       SARS-CoV-2: NotDetec (12-16-23 @ 10:18)     < from: CT Chest No Cont (12.26.23 @ 10:07) >  FINDINGS:    AIRWAYS, LUNGS, PLEURA: Interval worsening of right middle lobe   parenchymal consolidation with volume loss. Mild atelectasis in the   lingula and left base.    Trachea and mainstem bronchipatent. No pleural effusion.    MEDIASTINUM: Normal heart size. No pericardial effusion. Thoracic aorta   normal caliber.  No large mediastinal lymph nodes.    IMAGED ABDOMEN: Cholecystectomy. Right adrenal nodule as on 7/28/2020 CT   chest.    SOFT TISSUES: Unremarkable.    BONES: Unremarkable.      IMPRESSION:.    Interval worsening of right middle lobe consolidation, likely pneumonia.    --- End of Report ---        < end of copied text >                                                [facility]                                                INFECTIOUS DISEASES  =======================================================                     Cal George#   Martin Huber MD#   Esther Ceballos MD*                           Noreen Marin MD*   Sheba Miller MD*            Diplomates American Board of Internal Medicine & Infectious Diseases                  # [place] Office - Appt - Tel  [phone] Fax [phone]                * [place] Office - Appt - Tel [phone] Fax [phone]                                  Hospital Consult line:  [phone]  =======================================================      [id number]  AGUEDA LUIS   HPI:  69 yo female with HTN, HLD, DM2, HFpEF, CKD III, DVT, Uterine CA, COPD on home o2 who presented with complaints of shortness of breath. Patient reports that she has been sick all week and was recently started on Vantin and steroids while at the nursing home. Patient reports that her dyspnea just worsened and she told the nursing home to send her in for an evaluation. While in the ED, patient was placed on BIPAP and was given IV lasix with some improvement. RVP then results as RSV +. Patient had a CT scan which also showed pneumonia and pulmonary HTN. Admission to medicine was requested for further management. (16 Dec 2023 16:12)    Patient being treated with antibiotics for pneumonia, noted to have uptrending WBC on steroids  Ct chest repeated reported worsening of right lung pneumonia    pt reports feeling overall improved since admission  + cough feels congested but unable to bring up any phlegm  no diarrhea  back to baseline o2            I have personally reviewed the labs and data; pertinent labs and data are listed in this note; please see below.   =======================================================  Past Medical & Surgical Hx:  =====================  PAST MEDICAL & SURGICAL HISTORY:  Diabetes Mellitus Type II      HTN (Hypertension)      Endometrial Hyperplasia      Cervical Stenosis of Spine      Spinal Stenosis, Lumbar      Deep Vein Thrombosis (DVT)  Left leg, 2004, treated and resolved      Dyslipidemia      Cataract      Morbid Obesity      Vitamin D deficiency      Insomnia      CKD (chronic kidney disease)  ~ III      Congestive heart failure  ~ HFpEF      Uterine cancer      Asthma      On home oxygen therapy      Gait difficulty  ~ u ses walker      Cataract extracted with lens implant 1998  Right      C Section 1994      Cholecystectomy/appendectomy @ age 25      D&C x2 1980's      D&C 2008  hysteroscopy, endometrial hyperplasia, 2009      Cervical Spinal Stenosis surgery x2 (01/2002, 7/2002)      Tonsillectomy as a child      Endometrial biopsy 12/02/09      H/O laser iridotomy  left eye, 2016      H/O colonoscopy  1998      S/P total abdominal hysterectomy and bilateral salpingo-oophorectomy      S/P appendectomy      S/P cholecystectomy        Problem List:  ==========  HEALTH ISSUES - PROBLEM Dx:  Acute on chronic heart failure with preserved ejection fraction (HFpEF)    Morbid obesity    Respiratory failure with hypercapnia    RSV infection    Pulmonary hypertension    Atrial fibrillation          Social Hx:  =======  no toxic habits currently    FAMILY HISTORY:  Family history of pancreatic cancer    Family history of bladder cancer    Family history of lung cancer (Grandparent)    no significant family history of immunosuppressive disorders in mother or father   =======================================================    REVIEW OF SYSTEMS:  CONSTITUTIONAL:  No Fever or chills  HEENT:  No diplopia or blurred vision.  No earache, sore throat or runny nose.  CARDIOVASCULAR:  No pressure, squeezing, strangling, tightness, heaviness or aching about the chest, neck, axilla or epigastrium.  RESPIRATORY:  No cough, shortness of breath  GASTROINTESTINAL:  No nausea, vomiting or diarrhea.  GENITOURINARY:  No dysuria, frequency or urgency. No Blood in urine  MUSCULOSKELETAL:  no joint aches, no muscle pain  SKIN:  No change in skin, hair or nails.  NEUROLOGIC:  No Headaches, seizures or weakness.  PSYCHIATRIC:  No disorder of thought or mood.  ENDOCRINE:  No heat or cold intolerance  HEMATOLOGICAL:  No easy bruising or bleeding.    =======================================================  Allergies    wool- rash, itch (Other)  adhesives (Rash)  latex (Rash)  Bactrim (Flushing)    Intolerances    Antibiotics:    Other medications:  acetylcysteine 20%  Inhalation 4 milliLiter(s) Inhalation daily  atorvastatin 80 milliGRAM(s) Oral at bedtime  budesonide  80 MICROgram(s)/formoterol 4.5 MICROgram(s) Inhaler 2 Puff(s) Inhalation two times a day  chlorhexidine 2% Cloths 1 Application(s) Topical <User Schedule>  clotrimazole 1% Cream 1 Application(s) Topical two times a day  dextrose 5%. 1000 milliLiter(s) IV Continuous <Continuous>  dextrose 5%. 1000 milliLiter(s) IV Continuous <Continuous>  dextrose 50% Injectable 25 Gram(s) IV Push once  dextrose 50% Injectable 12.5 Gram(s) IV Push once  dextrose 50% Injectable 25 Gram(s) IV Push once  famotidine    Tablet 20 milliGRAM(s) Oral daily  glucagon  Injectable 1 milliGRAM(s) IntraMuscular once  heparin  Infusion.  Unit(s)/Hr IV Continuous <Continuous>  insulin glargine Injectable (LANTUS) 50 Unit(s) SubCutaneous at bedtime  insulin glargine Injectable (LANTUS) 50 Unit(s) SubCutaneous every morning  insulin lispro (ADMELOG) corrective regimen sliding scale   SubCutaneous three times a day before meals  insulin lispro Injectable (ADMELOG) 40 Unit(s) SubCutaneous three times a day before meals  ipratropium    for Nebulization 500 MICROGram(s) Nebulizer every 6 hours  lactulose Syrup 20 Gram(s) Oral daily  levalbuterol Inhalation 0.63 milliGRAM(s) Inhalation every 6 hours  metoprolol tartrate 75 milliGRAM(s) Oral every 8 hours  nystatin Powder 1 Application(s) Topical two times a day  oxyCODONE  ER Tablet 60 milliGRAM(s) Oral every 12 hours  polyethylene glycol 3350 17 Gram(s) Oral daily  senna 2 Tablet(s) Oral at bedtime     doxycycline IVPB   100 mL/Hr IV Intermittent (12-16-23 @ 23:35)    doxycycline IVPB   100 mL/Hr IV Intermittent (12-17-23 @ 10:37)   100 mL/Hr IV Intermittent (12-17-23 @ 22:55)    meropenem Injectable   1000 milliGRAM(s) IV Push (12-26-23 @ 13:03)   1000 milliGRAM(s) IV Push (12-26-23 @ 21:34)   1000 milliGRAM(s) IV Push (12-27-23 @ 05:22)   1000 milliGRAM(s) IV Push (12-27-23 @ 13:15)   1000 milliGRAM(s) IV Push (12-27-23 @ 21:46)   1000 milliGRAM(s) IV Push (12-28-23 @ 06:49)    piperacillin/tazobactam IVPB.   200 mL/Hr IV Intermittent (12-16-23 @ 15:21)    piperacillin/tazobactam IVPB.-   25 mL/Hr IV Intermittent (12-16-23 @ 17:45)    piperacillin/tazobactam IVPB..   25 mL/Hr IV Intermittent (12-23-23 @ 22:08)   25 mL/Hr IV Intermittent (12-24-23 @ 06:04)   25 mL/Hr IV Intermittent (12-24-23 @ 14:19)   25 mL/Hr IV Intermittent (12-24-23 @ 22:10)   25 mL/Hr IV Intermittent (12-25-23 @ 05:43)   25 mL/Hr IV Intermittent (12-25-23 @ 13:54)   25 mL/Hr IV Intermittent (12-25-23 @ 22:33)   25 mL/Hr IV Intermittent (12-26-23 @ 05:41)    piperacillin/tazobactam IVPB..   25 mL/Hr IV Intermittent (12-17-23 @ 06:42)   25 mL/Hr IV Intermittent (12-17-23 @ 15:03)   25 mL/Hr IV Intermittent (12-17-23 @ 22:55)   25 mL/Hr IV Intermittent (12-18-23 @ 06:02)   25 mL/Hr IV Intermittent (12-18-23 @ 14:19)   25 mL/Hr IV Intermittent (12-18-23 @ 22:10)   25 mL/Hr IV Intermittent (12-19-23 @ 07:04)   25 mL/Hr IV Intermittent (12-19-23 @ 13:11)   25 mL/Hr IV Intermittent (12-19-23 @ 23:02)   25 mL/Hr IV Intermittent (12-20-23 @ 06:23)   25 mL/Hr IV Intermittent (12-20-23 @ 13:07)   25 mL/Hr IV Intermittent (12-20-23 @ 22:17)   25 mL/Hr IV Intermittent (12-21-23 @ 06:34)   25 mL/Hr IV Intermittent (12-21-23 @ 13:12)   25 mL/Hr IV Intermittent (12-21-23 @ 21:59)   25 mL/Hr IV Intermittent (12-22-23 @ 05:35)   25 mL/Hr IV Intermittent (12-22-23 @ 15:38)   25 mL/Hr IV Intermittent (12-22-23 @ 22:00)   25 mL/Hr IV Intermittent (12-23-23 @ 06:03)    vancomycin  IVPB   300 mL/Hr IV Intermittent (12-26-23 @ 13:22)   300 mL/Hr IV Intermittent (12-27-23 @ 08:28)      ======================================================  Physical Exam:  ============  T(F): 98.5 (28 Dec 2023 09:57), Max: 98.6 (28 Dec 2023 05:11)  HR: 100 (28 Dec 2023 10:48)  BP: 107/43 (28 Dec 2023 09:57)  RR: 17 (28 Dec 2023 09:57)  SpO2: 98% (28 Dec 2023 10:48) (95% - 100%)  temp max in last 48H T(F): , Max: 99.1 (12-26-23 @ 16:00)    General:  No acute distress. on o2, morbidly obese  Eye: Normal conjunctiva.  Neck: Supple, No lymphadenopathy.  Respiratory: coarse BS right>left to auscultation, Respirations are non-labored.  Cardiovascular: Normal rate, Regular rhythm, s1 + s2  Gastrointestinal: Soft, Non-tender, Non-distended, Normal bowel sounds.  Genitourinary: No costovertebral angle tenderness.  Integumentary: b/l pitting edema  Neurologic: Alert, Oriented, No focal deficits  Psychiatric: Appropriate mood & affect.    =======================================================  Labs:                        13.5   31.44 )-----------( 259      ( 28 Dec 2023 04:37 )             41.7     12-28    134<L>  |  94<L>  |  75.1<H>  ----------------------------<  219<H>  5.6<H>   |  30.0<H>  |  1.66<H>    Ca    9.2      28 Dec 2023 04:37  Mg     2.6     12-27        Culture - Blood (collected 12-26-23 @ 09:31)  Source: .Blood Blood  Preliminary Report (12-27-23 @ 17:02):    No growth at 24 hours    Culture - Blood (collected 12-26-23 @ 09:21)  Source: .Blood Blood  Preliminary Report (12-27-23 @ 17:02):    No growth at 24 hours    Culture - Blood (collected 12-16-23 @ 15:20)  Source: .Blood Blood  Final Report (12-21-23 @ 22:00):    No growth at 5 days    Culture - Blood (collected 12-16-23 @ 15:10)  Source: .Blood Blood  Final Report (12-21-23 @ 22:00):    No growth at 5 days    Culture - Urine (collected 12-16-23 @ 12:30)  Source: Clean Catch Clean Catch (Midstream)  Final Report (12-17-23 @ 19:16):    No growth    Culture - Urine (collected 11-24-23 @ 14:48)  Source: Clean Catch Clean Catch (Midstream)  Final Report (11-25-23 @ 17:32):    <10,000 CFU/mL Normal Urogenital Vicente    Culture - Urine (collected 09-30-23 @ 05:00)  Source: Clean Catch Clean Catch (Midstream)  Final Report (10-03-23 @ 17:50):    10,000 - 49,000 CFU/mL Pseudomonas aeruginosa    10,000 - 49,000 CFU/mL Enterococcus faecium (vancomycin resistant)  Organism: Pseudomonas aeruginosa  Enterococcus faecium (vancomycin resistant) (10-03-23 @ 17:50)  Organism: Enterococcus faecium (vancomycin resistant) (10-03-23 @ 17:50)    Sensitivities:      Method Type: DARREL      -  Ampicillin: R >8 Predicts results to ampicillin/sulbactam, amoxacillin-clavulanate and  piperacillin-tazobactam.      -  Ciprofloxacin: R >2      -  Daptomycin: SDD 4 The breakpoint for SDD (susceptible dose dependent)is based on a dosage regimen of 8-12 mg/kg administered every 24 h in adults and is intended for serious infections due to E. faecium. Consultation with an infectious diseases specialist is recommended.      -  Levofloxacin: R >4      -  Linezolid: S 2      -  Nitrofurantoin: S <=32 Should not be used to treat pyelonephritis.      -  Tetracycline: R >8      -  Vancomycin: R >16  Organism: Pseudomonas aeruginosa (10-03-23 @ 17:50)    Sensitivities:      Method Type: DARREL      -  Amikacin: S <=16      -  Aztreonam: S <=4      -  Cefepime: S <=2      -  Ceftazidime: S 4      -  Ciprofloxacin: S <=0.25      -  Gentamicin: S <=2      -  Imipenem: S 2      -  Levofloxacin: S <=0.5      -  Meropenem: S <=1      -  Piperacillin/Tazobactam: S <=8      -  Tobramycin: S <=2    Culture - Urine (collected 09-23-23 @ 12:30)  Source: Catheterized Catheterized  Final Report (09-27-23 @ 09:04):    >100,000 CFU/ml Proteus mirabilis    <10,000 CFU/ml Normal Urogenital vicente present  Organism: Proteus mirabilis (09-27-23 @ 09:04)  Organism: Proteus mirabilis (09-27-23 @ 09:04)    Sensitivities:      Method Type: DARREL      -  Amikacin: S <=16      -  Amoxicillin/Clavulanic Acid: S <=8/4      -  Ampicillin: S <=8 These ampicillin results predict results for amoxicillin      -  Ampicillin/Sulbactam: S <=4/2      -  Aztreonam: S <=4      -  Cefazolin: S <=2 For uncomplicated UTI with K. pneumoniae, E. coli, or P. mirablis: DARREL <=16 is sensitive and DARREL >=32 is resistant. This also predicts results for oral agents cefaclor, cefdinir, cefpodoxime, cefprozil, cefuroxime axetil, cephalexin and locarbef for uncomplicated UTI. Note that some isolates may be susceptible to these agents while testing resistant to cefazolin.      -  Cefepime: S <=2      -  Cefoxitin: S <=8      -  Ceftriaxone: S <=1      -  Cefuroxime: S <=4      -  Ciprofloxacin: R >2      -  Ertapenem: S <=0.5      -  Gentamicin: S <=2      -  Levofloxacin: R 4      -  Meropenem: S <=1      -  Nitrofurantoin: R >64 Should not be used to treat pyelonephritis      -  Piperacillin/Tazobactam: S <=8      -  Tobramycin: S <=2      -  Trimethoprim/Sulfamethoxazole: S 2/38    Culture - Urine (collected 09-17-23 @ 18:08)  Source: Clean Catch Clean Catch (Midstream)  Final Report (09-20-23 @ 07:41):    <10,000 CFU/mL Normal Urogenital Vicente    Culture - Blood (collected 10-12-22 @ 22:15)  Source: .Blood Blood-Venous  Final Report (10-18-22 @ 02:00):    No Growth Final    Culture - Blood (collected 10-12-22 @ 22:00)  Source: .Blood Blood-Peripheral  Final Report (10-18-22 @ 02:00):    No Growth Final    Culture - Urine (collected 10-12-22 @ 20:57)  Source: Clean Catch Clean Catch (Midstream)  Final Report (10-13-22 @ 23:42):    >=3 organisms. Probable collection contamination.    Culture - Urine (collected 01-25-22 @ 16:13)  Source: Catheterized Catheterized  Final Report (01-26-22 @ 16:09):    <10,000 CFU/mL Normal Urogenital Vicente       SARS-CoV-2: NotDetec (12-16-23 @ 10:18)     < from: CT Chest No Cont (12.26.23 @ 10:07) >  FINDINGS:    AIRWAYS, LUNGS, PLEURA: Interval worsening of right middle lobe   parenchymal consolidation with volume loss. Mild atelectasis in the   lingula and left base.    Trachea and mainstem bronchipatent. No pleural effusion.    MEDIASTINUM: Normal heart size. No pericardial effusion. Thoracic aorta   normal caliber.  No large mediastinal lymph nodes.    IMAGED ABDOMEN: Cholecystectomy. Right adrenal nodule as on 7/28/2020 CT   chest.    SOFT TISSUES: Unremarkable.    BONES: Unremarkable.      IMPRESSION:.    Interval worsening of right middle lobe consolidation, likely pneumonia.    --- End of Report ---        < end of copied text >

## 2023-12-28 NOTE — CHART NOTE - NSCHARTNOTEFT_GEN_A_CORE
Source: Patient [x ]  Family [ ]   other [ ]    Current Diet: Diet, NPO after Midnight:      NPO Start Date: 27-Dec-2023,   NPO Start Time: 23:59 (12-27-23 @ 15:14)  Diet, Consistent Carbohydrate w/Evening Snack:   1500mL Fluid Restriction ([id number])  Low Sodium (12-19-23 @ 18:24)      Patient reports [x ] nausea  [ ] vomiting [ ] diarrhea [ ] constipation  [ ]chewing problems [ ] swallowing issues  [ ] other:     PO intake:  < 50% [ ]   50-75%  [ x]   %  [ ]  other :    Source for PO intake [ x] Patient [ ] family [ ] chart [ ] staff [ ] other    Current Weight: 350lbs (12/28)    Edema: +2 right foot; left foot, +3 Generalized   % Weight Change   27% 130lb Weight Loss over 3 months      Pertinent Medications: MEDICATIONS  (STANDING):  acetylcysteine 20%  Inhalation 4 milliLiter(s) Inhalation daily  atorvastatin 80 milliGRAM(s) Oral at bedtime  budesonide  80 MICROgram(s)/formoterol 4.5 MICROgram(s) Inhaler 2 Puff(s) Inhalation two times a day  chlorhexidine 2% Cloths 1 Application(s) Topical <User Schedule>  clotrimazole 1% Cream 1 Application(s) Topical two times a day  dextrose 5%. 1000 milliLiter(s) (100 mL/Hr) IV Continuous <Continuous>  famotidine    Tablet 20 milliGRAM(s) Oral daily  glucagon  Injectable 1 milliGRAM(s) IntraMuscular once  heparin  Infusion.  Unit(s)/Hr (24 mL/Hr) IV Continuous <Continuous>  insulin glargine Injectable (LANTUS) 50 Unit(s) SubCutaneous every morning  ipratropium    for Nebulization 500 MICROGram(s) Nebulizer every 6 hours  lactulose Syrup 20 Gram(s) Oral daily  levalbuterol Inhalation 0.63 milliGRAM(s) Inhalation every 6 hours  meropenem Injectable 1000 milliGRAM(s) IV Push every 8 hours  metoprolol tartrate 75 milliGRAM(s) Oral every 8 hours  nystatin Powder 1 Application(s) Topical two times a day  oxyCODONE  ER Tablet 60 milliGRAM(s) Oral every 12 hours  polyethylene glycol 3350 17 Gram(s) Oral daily  senna 2 Tablet(s) Oral at bedtime  sodium chloride 0.9% Bolus 250 milliLiter(s) IV Bolus once  vancomycin  IVPB 1500 milliGRAM(s) IV Intermittent every 24 hours    MEDICATIONS  (PRN):  acetaminophen     Tablet .. 650 milliGRAM(s) Oral every 6 hours PRN Temp greater or equal to 38C (100.4F), Mild Pain (1 - 3)  albuterol    90 MICROgram(s) HFA Inhaler 2 Puff(s) Inhalation every 2 hours PRN Shortness of Breath and/or Wheezing  dextrose Oral Gel 15 Gram(s) Oral once PRN Blood Glucose LESS THAN 70 milliGRAM(s)/deciliter  diphenhydrAMINE 25 milliGRAM(s) Oral every 6 hours PRN Rash and/or Itching  heparin   Injectable 5000 Unit(s) IV Push every 6 hours PRN For aPTT between 40 - 57  heparin   Injectable 63719 Unit(s) IV Push every 6 hours PRN For aPTT less than 40  hydrALAZINE Injectable 10 milliGRAM(s) IV Push every 4 hours PRN for systolic bp > 160  HYDROmorphone  Injectable 1 milliGRAM(s) IV Push every 4 hours PRN breakthrough  pain  melatonin 3 milliGRAM(s) Oral at bedtime PRN Insomnia  metoprolol tartrate Injectable 5 milliGRAM(s) IV Push every 6 hours PRN heart rate sustaining greater than 130  ondansetron Injectable 4 milliGRAM(s) IV Push every 8 hours PRN Nausea and/or Vomiting      Pertinent Labs: 12-28 Na134 mmol/L<L> Glu 219 mg/dL<H> K+ 5.6 mmol/L<H> Cr  1.66 mg/dL<H> BUN 75.1 mg/dL<H>    Skin:   IAD/ MAD in all folds, LE Evert     Estimated Needs:   [x ] no change since previous assessment  [ ] recalculated:     69 yo female with HTN, HLD, DM2, HFpEF, CKD III, DVT, Uterine CA, COPD on home o2 who presented with complaints of shortness of breath. Patient reports that she has been sick all week and was recently started on Vantin and steroids while at the nursing home. Patient reports that her dyspnea just worsened and she told the nursing home to send her in for an evaluation. While in the ED, patient was placed on BIPAP and was given IV lasix with some improvement. RVP then results as RSV +. Patient had a CT scan which also showed pneumonia and pulmonary HTN. Admission to medicine was requested for further management.    Current Nutrition Diagnosis: Pt remains Nutritionally at risk. Acute Severe malnutrition Related to inability to meet sufficient protein-energy needs in setting of Heart Failure( Fluid Fluctuations) , COPD, and Frequent hospitalizations As evidenced by weight loss <50%EER for >5days,  >7.5% x3 months, and Edema +2,+3. Pt met at bedside and reports Wt at 350lbs and Ht of 5'3 with weight loss of 130lbs(likely fluid related) over 3 months. Pt reported mild nausea and appetite retuning in house with PO intake around 50%.  Pt reported full understanding of current diet plan DASH/CC with fluid restrictions, but was reenforced with education on building a heart healthy plate with paperwork on alternatives and tips. RD to remain available.          Recommendations:   Pt will meet >75% of estimated protein-energy needs via tolerated route   Encourage po intake, monitor diet tolerance, and provide assistance at meals as needed.   Rx: MVI daily, OsCal supplementation   Monitor electrolytes and replete prn     Monitoring and Evaluation:   [x ] PO intake [ x] Tolerance to diet prescription [X] Weights  [X] Follow up per protocol [X] Labs: Source: Patient [x ]  Family [ ]   other [ ]    Current Diet: Diet, NPO after Midnight:      NPO Start Date: 27-Dec-2023,   NPO Start Time: 23:59 (12-27-23 @ 15:14)  Diet, Consistent Carbohydrate w/Evening Snack:   1500mL Fluid Restriction ([id number])  Low Sodium (12-19-23 @ 18:24)      Patient reports [x ] nausea  [ ] vomiting [ ] diarrhea [ ] constipation  [ ]chewing problems [ ] swallowing issues  [ ] other:     PO intake:  < 50% [ ]   50-75%  [ x]   %  [ ]  other :    Source for PO intake [ x] Patient [ ] family [ ] chart [ ] staff [ ] other    Current Weight: 350lbs (12/28)    Edema: +2 right foot; left foot, +3 Generalized   % Weight Change   27% 130lb Weight Loss over 3 months      Pertinent Medications: MEDICATIONS  (STANDING):  acetylcysteine 20%  Inhalation 4 milliLiter(s) Inhalation daily  atorvastatin 80 milliGRAM(s) Oral at bedtime  budesonide  80 MICROgram(s)/formoterol 4.5 MICROgram(s) Inhaler 2 Puff(s) Inhalation two times a day  chlorhexidine 2% Cloths 1 Application(s) Topical <User Schedule>  clotrimazole 1% Cream 1 Application(s) Topical two times a day  dextrose 5%. 1000 milliLiter(s) (100 mL/Hr) IV Continuous <Continuous>  famotidine    Tablet 20 milliGRAM(s) Oral daily  glucagon  Injectable 1 milliGRAM(s) IntraMuscular once  heparin  Infusion.  Unit(s)/Hr (24 mL/Hr) IV Continuous <Continuous>  insulin glargine Injectable (LANTUS) 50 Unit(s) SubCutaneous every morning  ipratropium    for Nebulization 500 MICROGram(s) Nebulizer every 6 hours  lactulose Syrup 20 Gram(s) Oral daily  levalbuterol Inhalation 0.63 milliGRAM(s) Inhalation every 6 hours  meropenem Injectable 1000 milliGRAM(s) IV Push every 8 hours  metoprolol tartrate 75 milliGRAM(s) Oral every 8 hours  nystatin Powder 1 Application(s) Topical two times a day  oxyCODONE  ER Tablet 60 milliGRAM(s) Oral every 12 hours  polyethylene glycol 3350 17 Gram(s) Oral daily  senna 2 Tablet(s) Oral at bedtime  sodium chloride 0.9% Bolus 250 milliLiter(s) IV Bolus once  vancomycin  IVPB 1500 milliGRAM(s) IV Intermittent every 24 hours    MEDICATIONS  (PRN):  acetaminophen     Tablet .. 650 milliGRAM(s) Oral every 6 hours PRN Temp greater or equal to 38C (100.4F), Mild Pain (1 - 3)  albuterol    90 MICROgram(s) HFA Inhaler 2 Puff(s) Inhalation every 2 hours PRN Shortness of Breath and/or Wheezing  dextrose Oral Gel 15 Gram(s) Oral once PRN Blood Glucose LESS THAN 70 milliGRAM(s)/deciliter  diphenhydrAMINE 25 milliGRAM(s) Oral every 6 hours PRN Rash and/or Itching  heparin   Injectable 5000 Unit(s) IV Push every 6 hours PRN For aPTT between 40 - 57  heparin   Injectable [id number] Unit(s) IV Push every 6 hours PRN For aPTT less than 40  hydrALAZINE Injectable 10 milliGRAM(s) IV Push every 4 hours PRN for systolic bp > 160  HYDROmorphone  Injectable 1 milliGRAM(s) IV Push every 4 hours PRN breakthrough  pain  melatonin 3 milliGRAM(s) Oral at bedtime PRN Insomnia  metoprolol tartrate Injectable 5 milliGRAM(s) IV Push every 6 hours PRN heart rate sustaining greater than 130  ondansetron Injectable 4 milliGRAM(s) IV Push every 8 hours PRN Nausea and/or Vomiting      Pertinent Labs: 12-28 Na134 mmol/L<L> Glu 219 mg/dL<H> K+ 5.6 mmol/L<H> Cr  1.66 mg/dL<H> BUN 75.1 mg/dL<H>    Skin:   IAD/ MAD in all folds, LE Evert     Estimated Needs:   [x ] no change since previous assessment  [ ] recalculated:     67 yo female with HTN, HLD, DM2, HFpEF, CKD III, DVT, Uterine CA, COPD on home o2 who presented with complaints of shortness of breath. Patient reports that she has been sick all week and was recently started on Vantin and steroids while at the nursing home. Patient reports that her dyspnea just worsened and she told the nursing home to send her in for an evaluation. While in the ED, patient was placed on BIPAP and was given IV lasix with some improvement. RVP then results as RSV +. Patient had a CT scan which also showed pneumonia and pulmonary HTN. Admission to medicine was requested for further management.    Current Nutrition Diagnosis: Pt remains Nutritionally at risk. Acute Severe malnutrition Related to inability to meet sufficient protein-energy needs in setting of Heart Failure( Fluid Fluctuations) , COPD, and Frequent hospitalizations As evidenced by weight loss <50%EER for >5days,  >7.5% x3 months, and Edema +2,+3. Pt met at bedside and reports Wt at 350lbs and Ht of 5'3 with weight loss of 130lbs(likely fluid related) over 3 months. Pt reported mild nausea and appetite retuning in house with PO intake around 50%.  Pt reported full understanding of current diet plan DASH/CC with fluid restrictions, but was reenforced with education on building a heart healthy plate with paperwork on alternatives and tips. RD to remain available.          Recommendations:   Pt will meet >75% of estimated protein-energy needs via tolerated route   Encourage po intake, monitor diet tolerance, and provide assistance at meals as needed.   Rx: MVI daily, OsCal supplementation   Monitor electrolytes and replete prn     Monitoring and Evaluation:   [x ] PO intake [ x] Tolerance to diet prescription [X] Weights  [X] Follow up per protocol [X] Labs:

## 2023-12-28 NOTE — PROGRESS NOTE ADULT - ASSESSMENT
68 year old female with PMH of morbidly obesity (500+lbs), DM, HTN, HLD, asthma, HFpEF, chronic hypoxic respiratory failure on 4L home oxygen, CKD, spinal stenosis (cervical spine + lumbar spine) and hx of DVT presents with worsening SOB. . Nephrology is consulted for worsening JACQUES on CKD.     JACQUES on  CKD stage III  -Baseline creatinine is ~1.6- 2.0 mg/dL in Oct 2022 (as per [facility])  -On admission, creatinine was1.4  - UA bland   - No hydro on CT 11/24/23     Acute on chronic HFpEF, NYHA III, ACC C  Bacterial pneumonia  Pulmonary HTN, Severe ( PASP 70 mmHg)  Pt hypervolemic on exam  Continue diuretics/ permissive azotemia  Component of elevated APOLONIA related to steroids  Add Torsemide 20 daily  Check TFT's      68 year old female with PMH of morbidly obesity (500+lbs), DM, HTN, HLD, asthma, HFpEF, chronic hypoxic respiratory failure on 4L home oxygen, CKD, spinal stenosis (cervical spine + lumbar spine) and hx of DVT presents with worsening SOB. . Nephrology is consulted for worsening JACQUES on CKD.     JACQUES on  CKD stage III  -Baseline creatinine is ~1.6- 2.0 mg/dL in Oct 2022 (as per [facility])  -On admission, creatinine was1.4  - UA bland   - No hydro on CT 11/24/23     Acute on chronic HFpEF, NYHA III, ACC C  Bacterial pneumonia  Pulmonary HTN, Severe ( PASP 70 mmHg)  Pt hypervolemic on exam  Continue diuretics/ permissive azotemia  Component of elevated APOLONIA related to steroids  Add Torsemide 20 daily  Check TFT's

## 2023-12-28 NOTE — PROGRESS NOTE ADULT - NS ATTEND AMEND GEN_ALL_CORE FT
69 yo female with HTN, HLD, DM2, HFpEF, CKD III, DVT, Uterine CA, COPD on home o2 w admitted with acute on chronic resp failure and acute HFpEF exacerbation, superimposed bacterial Pneumonia in setting of RSV and new onset AFib and worsening GFR. Hyperglycemia slowly improving on lower steroid doses, no insulin increase at this time 67 yo female with HTN, HLD, DM2, HFpEF, CKD III, DVT, Uterine CA, COPD on home o2 w admitted with acute on chronic resp failure and acute HFpEF exacerbation, superimposed bacterial Pneumonia in setting of RSV and new onset AFib and worsening GFR. Hyperglycemia slowly improving on lower steroid doses, no insulin increase at this time

## 2023-12-29 ENCOUNTER — TRANSCRIPTION ENCOUNTER (OUTPATIENT)
Age: 68
End: 2023-12-29

## 2023-12-29 DIAGNOSIS — I25.10 ATHEROSCLEROTIC HEART DISEASE OF NATIVE CORONARY ARTERY WITHOUT ANGINA PECTORIS: ICD-10-CM

## 2023-12-29 LAB
ANION GAP SERPL CALC-SCNC: 12 MMOL/L — SIGNIFICANT CHANGE UP (ref 5–17)
ANION GAP SERPL CALC-SCNC: 12 MMOL/L — SIGNIFICANT CHANGE UP (ref 5–17)
APTT BLD: 61.6 SEC — HIGH (ref 24.5–35.6)
APTT BLD: 61.6 SEC — HIGH (ref 24.5–35.6)
BUN SERPL-MCNC: 69.4 MG/DL — HIGH (ref 8–20)
BUN SERPL-MCNC: 69.4 MG/DL — HIGH (ref 8–20)
CALCIUM SERPL-MCNC: 9.3 MG/DL — SIGNIFICANT CHANGE UP (ref 8.4–10.5)
CALCIUM SERPL-MCNC: 9.3 MG/DL — SIGNIFICANT CHANGE UP (ref 8.4–10.5)
CHLORIDE SERPL-SCNC: 94 MMOL/L — LOW (ref 96–108)
CHLORIDE SERPL-SCNC: 94 MMOL/L — LOW (ref 96–108)
CO2 SERPL-SCNC: 30 MMOL/L — HIGH (ref 22–29)
CO2 SERPL-SCNC: 30 MMOL/L — HIGH (ref 22–29)
CREAT SERPL-MCNC: 1.6 MG/DL — HIGH (ref 0.5–1.3)
CREAT SERPL-MCNC: 1.6 MG/DL — HIGH (ref 0.5–1.3)
EGFR: 35 ML/MIN/1.73M2 — LOW
EGFR: 35 ML/MIN/1.73M2 — LOW
GLUCOSE BLDC GLUCOMTR-MCNC: 183 MG/DL — HIGH (ref 70–99)
GLUCOSE BLDC GLUCOMTR-MCNC: 183 MG/DL — HIGH (ref 70–99)
GLUCOSE BLDC GLUCOMTR-MCNC: 195 MG/DL — HIGH (ref 70–99)
GLUCOSE BLDC GLUCOMTR-MCNC: 195 MG/DL — HIGH (ref 70–99)
GLUCOSE BLDC GLUCOMTR-MCNC: 246 MG/DL — HIGH (ref 70–99)
GLUCOSE BLDC GLUCOMTR-MCNC: 246 MG/DL — HIGH (ref 70–99)
GLUCOSE SERPL-MCNC: 218 MG/DL — HIGH (ref 70–99)
GLUCOSE SERPL-MCNC: 218 MG/DL — HIGH (ref 70–99)
HCT VFR BLD CALC: 41.3 % — SIGNIFICANT CHANGE UP (ref 34.5–45)
HCT VFR BLD CALC: 41.3 % — SIGNIFICANT CHANGE UP (ref 34.5–45)
HGB BLD-MCNC: 13.4 G/DL — SIGNIFICANT CHANGE UP (ref 11.5–15.5)
HGB BLD-MCNC: 13.4 G/DL — SIGNIFICANT CHANGE UP (ref 11.5–15.5)
MCHC RBC-ENTMCNC: 28.8 PG — SIGNIFICANT CHANGE UP (ref 27–34)
MCHC RBC-ENTMCNC: 28.8 PG — SIGNIFICANT CHANGE UP (ref 27–34)
MCHC RBC-ENTMCNC: 32.4 GM/DL — SIGNIFICANT CHANGE UP (ref 32–36)
MCHC RBC-ENTMCNC: 32.4 GM/DL — SIGNIFICANT CHANGE UP (ref 32–36)
MCV RBC AUTO: 88.8 FL — SIGNIFICANT CHANGE UP (ref 80–100)
MCV RBC AUTO: 88.8 FL — SIGNIFICANT CHANGE UP (ref 80–100)
PLATELET # BLD AUTO: 227 K/UL — SIGNIFICANT CHANGE UP (ref 150–400)
PLATELET # BLD AUTO: 227 K/UL — SIGNIFICANT CHANGE UP (ref 150–400)
POTASSIUM SERPL-MCNC: 5 MMOL/L — SIGNIFICANT CHANGE UP (ref 3.5–5.3)
POTASSIUM SERPL-MCNC: 5 MMOL/L — SIGNIFICANT CHANGE UP (ref 3.5–5.3)
POTASSIUM SERPL-SCNC: 5 MMOL/L — SIGNIFICANT CHANGE UP (ref 3.5–5.3)
POTASSIUM SERPL-SCNC: 5 MMOL/L — SIGNIFICANT CHANGE UP (ref 3.5–5.3)
RBC # BLD: 4.65 M/UL — SIGNIFICANT CHANGE UP (ref 3.8–5.2)
RBC # BLD: 4.65 M/UL — SIGNIFICANT CHANGE UP (ref 3.8–5.2)
RBC # FLD: 14 % — SIGNIFICANT CHANGE UP (ref 10.3–14.5)
RBC # FLD: 14 % — SIGNIFICANT CHANGE UP (ref 10.3–14.5)
SODIUM SERPL-SCNC: 136 MMOL/L — SIGNIFICANT CHANGE UP (ref 135–145)
SODIUM SERPL-SCNC: 136 MMOL/L — SIGNIFICANT CHANGE UP (ref 135–145)
T4 FREE SERPL-MCNC: 1.6 NG/DL — SIGNIFICANT CHANGE UP (ref 0.9–1.8)
T4 FREE SERPL-MCNC: 1.6 NG/DL — SIGNIFICANT CHANGE UP (ref 0.9–1.8)
TSH SERPL-MCNC: 1.42 UIU/ML — SIGNIFICANT CHANGE UP (ref 0.27–4.2)
TSH SERPL-MCNC: 1.42 UIU/ML — SIGNIFICANT CHANGE UP (ref 0.27–4.2)
WBC # BLD: 27.52 K/UL — HIGH (ref 3.8–10.5)
WBC # BLD: 27.52 K/UL — HIGH (ref 3.8–10.5)
WBC # FLD AUTO: 27.52 K/UL — HIGH (ref 3.8–10.5)
WBC # FLD AUTO: 27.52 K/UL — HIGH (ref 3.8–10.5)

## 2023-12-29 PROCEDURE — 99232 SBSQ HOSP IP/OBS MODERATE 35: CPT

## 2023-12-29 PROCEDURE — 93460 R&L HRT ART/VENTRICLE ANGIO: CPT | Mod: 26

## 2023-12-29 RX ORDER — RIVAROXABAN 15 MG-20MG
15 KIT ORAL DAILY
Refills: 0 | Status: DISCONTINUED | OUTPATIENT
Start: 2023-12-30 | End: 2024-01-08

## 2023-12-29 RX ORDER — LEVALBUTEROL 1.25 MG/.5ML
0.63 SOLUTION, CONCENTRATE RESPIRATORY (INHALATION) ONCE
Refills: 0 | Status: COMPLETED | OUTPATIENT
Start: 2023-12-29 | End: 2023-12-29

## 2023-12-29 RX ORDER — METOPROLOL TARTRATE 50 MG
5 TABLET ORAL ONCE
Refills: 0 | Status: COMPLETED | OUTPATIENT
Start: 2023-12-29 | End: 2023-12-29

## 2023-12-29 RX ORDER — INSULIN GLARGINE 100 [IU]/ML
40 INJECTION, SOLUTION SUBCUTANEOUS AT BEDTIME
Refills: 0 | Status: DISCONTINUED | OUTPATIENT
Start: 2023-12-29 | End: 2024-01-01

## 2023-12-29 RX ORDER — ACETYLCYSTEINE 200 MG/ML
1200 VIAL (ML) MISCELLANEOUS EVERY 12 HOURS
Refills: 0 | Status: COMPLETED | OUTPATIENT
Start: 2023-12-29 | End: 2023-12-30

## 2023-12-29 RX ORDER — METOPROLOL TARTRATE 50 MG
2.5 TABLET ORAL ONCE
Refills: 0 | Status: COMPLETED | OUTPATIENT
Start: 2023-12-29 | End: 2023-12-29

## 2023-12-29 RX ORDER — INSULIN LISPRO 100/ML
35 VIAL (ML) SUBCUTANEOUS
Refills: 0 | Status: DISCONTINUED | OUTPATIENT
Start: 2023-12-29 | End: 2023-12-30

## 2023-12-29 RX ORDER — INSULIN GLARGINE 100 [IU]/ML
40 INJECTION, SOLUTION SUBCUTANEOUS EVERY MORNING
Refills: 0 | Status: DISCONTINUED | OUTPATIENT
Start: 2023-12-30 | End: 2024-01-01

## 2023-12-29 RX ORDER — SODIUM CHLORIDE 9 MG/ML
500 INJECTION INTRAMUSCULAR; INTRAVENOUS; SUBCUTANEOUS ONCE
Refills: 0 | Status: COMPLETED | OUTPATIENT
Start: 2023-12-29 | End: 2023-12-29

## 2023-12-29 RX ORDER — ASPIRIN/CALCIUM CARB/MAGNESIUM 324 MG
81 TABLET ORAL ONCE
Refills: 0 | Status: COMPLETED | OUTPATIENT
Start: 2023-12-29 | End: 2023-12-29

## 2023-12-29 RX ADMIN — Medication 4 MILLILITER(S): at 08:27

## 2023-12-29 RX ADMIN — SENNA PLUS 2 TABLET(S): 8.6 TABLET ORAL at 22:14

## 2023-12-29 RX ADMIN — LACTULOSE 20 GRAM(S): 10 SOLUTION ORAL at 19:18

## 2023-12-29 RX ADMIN — HEPARIN SODIUM 1000 UNIT(S)/HR: 5000 INJECTION INTRAVENOUS; SUBCUTANEOUS at 06:31

## 2023-12-29 RX ADMIN — FAMOTIDINE 20 MILLIGRAM(S): 10 INJECTION INTRAVENOUS at 19:19

## 2023-12-29 RX ADMIN — Medication 500 MICROGRAM(S): at 03:48

## 2023-12-29 RX ADMIN — OXYCODONE HYDROCHLORIDE 5 MILLIGRAM(S): 5 TABLET ORAL at 23:00

## 2023-12-29 RX ADMIN — BUDESONIDE AND FORMOTEROL FUMARATE DIHYDRATE 2 PUFF(S): 160; 4.5 AEROSOL RESPIRATORY (INHALATION) at 20:48

## 2023-12-29 RX ADMIN — Medication 1 APPLICATION(S): at 19:19

## 2023-12-29 RX ADMIN — LEVALBUTEROL 0.63 MILLIGRAM(S): 1.25 SOLUTION, CONCENTRATE RESPIRATORY (INHALATION) at 17:07

## 2023-12-29 RX ADMIN — Medication 5 MILLIGRAM(S): at 14:54

## 2023-12-29 RX ADMIN — LEVALBUTEROL 0.63 MILLIGRAM(S): 1.25 SOLUTION, CONCENTRATE RESPIRATORY (INHALATION) at 20:46

## 2023-12-29 RX ADMIN — Medication 1 APPLICATION(S): at 05:10

## 2023-12-29 RX ADMIN — SODIUM CHLORIDE 500 MILLILITER(S): 9 INJECTION INTRAMUSCULAR; INTRAVENOUS; SUBCUTANEOUS at 08:15

## 2023-12-29 RX ADMIN — Medication 20 MILLIGRAM(S): at 06:26

## 2023-12-29 RX ADMIN — Medication 500 MICROGRAM(S): at 08:27

## 2023-12-29 RX ADMIN — HEPARIN SODIUM 1000 UNIT(S)/HR: 5000 INJECTION INTRAVENOUS; SUBCUTANEOUS at 07:16

## 2023-12-29 RX ADMIN — Medication 4: at 08:20

## 2023-12-29 RX ADMIN — OXYCODONE HYDROCHLORIDE 60 MILLIGRAM(S): 5 TABLET ORAL at 06:46

## 2023-12-29 RX ADMIN — OXYCODONE HYDROCHLORIDE 5 MILLIGRAM(S): 5 TABLET ORAL at 22:15

## 2023-12-29 RX ADMIN — NYSTATIN CREAM 1 APPLICATION(S): 100000 CREAM TOPICAL at 19:19

## 2023-12-29 RX ADMIN — INSULIN GLARGINE 50 UNIT(S): 100 INJECTION, SOLUTION SUBCUTANEOUS at 08:21

## 2023-12-29 RX ADMIN — BUDESONIDE AND FORMOTEROL FUMARATE DIHYDRATE 2 PUFF(S): 160; 4.5 AEROSOL RESPIRATORY (INHALATION) at 08:27

## 2023-12-29 RX ADMIN — OXYCODONE HYDROCHLORIDE 60 MILLIGRAM(S): 5 TABLET ORAL at 05:10

## 2023-12-29 RX ADMIN — Medication 1200 MILLIGRAM(S): at 13:44

## 2023-12-29 RX ADMIN — Medication 500 MICROGRAM(S): at 20:46

## 2023-12-29 RX ADMIN — Medication 75 MILLIGRAM(S): at 05:10

## 2023-12-29 RX ADMIN — Medication 40 UNIT(S): at 08:20

## 2023-12-29 RX ADMIN — NYSTATIN CREAM 1 APPLICATION(S): 100000 CREAM TOPICAL at 05:11

## 2023-12-29 RX ADMIN — LEVALBUTEROL 0.63 MILLIGRAM(S): 1.25 SOLUTION, CONCENTRATE RESPIRATORY (INHALATION) at 03:48

## 2023-12-29 RX ADMIN — OXYCODONE HYDROCHLORIDE 60 MILLIGRAM(S): 5 TABLET ORAL at 17:57

## 2023-12-29 RX ADMIN — LEVALBUTEROL 0.63 MILLIGRAM(S): 1.25 SOLUTION, CONCENTRATE RESPIRATORY (INHALATION) at 08:26

## 2023-12-29 RX ADMIN — ATORVASTATIN CALCIUM 80 MILLIGRAM(S): 80 TABLET, FILM COATED ORAL at 22:14

## 2023-12-29 RX ADMIN — Medication 81 MILLIGRAM(S): at 14:53

## 2023-12-29 RX ADMIN — CHLORHEXIDINE GLUCONATE 1 APPLICATION(S): 213 SOLUTION TOPICAL at 05:10

## 2023-12-29 RX ADMIN — Medication 75 MILLIGRAM(S): at 22:15

## 2023-12-29 RX ADMIN — INSULIN GLARGINE 40 UNIT(S): 100 INJECTION, SOLUTION SUBCUTANEOUS at 22:27

## 2023-12-29 RX ADMIN — Medication 30 MILLIGRAM(S): at 05:10

## 2023-12-29 RX ADMIN — Medication 2.5 MILLIGRAM(S): at 17:05

## 2023-12-29 NOTE — PROGRESS NOTE ADULT - SUBJECTIVE AND OBJECTIVE BOX
Cleared from renal standpoint to proceed w cardiac cath  noted saline bolus  Will premedicate w mucomyst  Full note to follow

## 2023-12-29 NOTE — DISCHARGE NOTE PROVIDER - NSDCACTIVITY_GEN_ALL_CORE
AS TOLERATED WITH FULL ASSIST WITH WALKER AS TOLERATED WITH FULL ASSIST WITH WALKER/Walking - Indoors allowed/Walking - Outdoors allowed

## 2023-12-29 NOTE — DISCHARGE NOTE PROVIDER - PROVIDER TOKENS
PROVIDER:[TOKEN:[[id number]],FOLLOWUP:[2 weeks]],PROVIDER:[TOKEN:[[id number]],FOLLOWUP:[2 weeks]] PROVIDER:[TOKEN:[[id number]],FOLLOWUP:[2 weeks]],PROVIDER:[TOKEN:[[id number]],FOLLOWUP:[2 weeks]] PROVIDER:[TOKEN:[[id number]],FOLLOWUP:[2 weeks]],PROVIDER:[TOKEN:[[id number]],FOLLOWUP:[2 weeks]],FREE:[LAST:[PMD],PHONE:[(   )    -],FAX:[(   )    -]] PROVIDER:[TOKEN:[[id number]],FOLLOWUP:[2 weeks]],PROVIDER:[TOKEN:[[id number]],FOLLOWUP:[2 weeks]],FREE:[LAST:[PMD],PHONE:[(   )    -],FAX:[(   )    -]]

## 2023-12-29 NOTE — PROGRESS NOTE ADULT - SUBJECTIVE AND OBJECTIVE BOX
INTERVAL EVENTS:  Follow up diabetes management  Pt states she is going for cardiac cath later today    ROS: Feels anxious, otherwise denies chest pain or sob.     MEDICATIONS  (STANDING):  acetylcysteine  Oral Solution 1200 milliGRAM(s) Oral every 12 hours  acetylcysteine 20%  Inhalation 4 milliLiter(s) Inhalation daily  atorvastatin 80 milliGRAM(s) Oral at bedtime  budesonide  80 MICROgram(s)/formoterol 4.5 MICROgram(s) Inhaler 2 Puff(s) Inhalation two times a day  chlorhexidine 2% Cloths 1 Application(s) Topical <User Schedule>  clotrimazole 1% Cream 1 Application(s) Topical two times a day  dextrose 5%. 1000 milliLiter(s) (100 mL/Hr) IV Continuous <Continuous>  dextrose 5%. 1000 milliLiter(s) (50 mL/Hr) IV Continuous <Continuous>  dextrose 50% Injectable 12.5 Gram(s) IV Push once  dextrose 50% Injectable 25 Gram(s) IV Push once  dextrose 50% Injectable 25 Gram(s) IV Push once  famotidine    Tablet 20 milliGRAM(s) Oral daily  glucagon  Injectable 1 milliGRAM(s) IntraMuscular once  heparin  Infusion.  Unit(s)/Hr (24 mL/Hr) IV Continuous <Continuous>  insulin glargine Injectable (LANTUS) 40 Unit(s) SubCutaneous at bedtime  insulin lispro (ADMELOG) corrective regimen sliding scale   SubCutaneous three times a day before meals  insulin lispro Injectable (ADMELOG) 35 Unit(s) SubCutaneous three times a day before meals  ipratropium    for Nebulization 500 MICROGram(s) Nebulizer every 6 hours  lactulose Syrup 20 Gram(s) Oral daily  levalbuterol Inhalation 0.63 milliGRAM(s) Inhalation every 6 hours  metoprolol tartrate 75 milliGRAM(s) Oral every 8 hours  nystatin Powder 1 Application(s) Topical two times a day  oxyCODONE  ER Tablet 60 milliGRAM(s) Oral every 12 hours  polyethylene glycol 3350 17 Gram(s) Oral daily  senna 2 Tablet(s) Oral at bedtime  torsemide 20 milliGRAM(s) Oral daily    MEDICATIONS  (PRN):  acetaminophen     Tablet .. 650 milliGRAM(s) Oral every 6 hours PRN Temp greater or equal to 38C (100.4F), Mild Pain (1 - 3)  albuterol    90 MICROgram(s) HFA Inhaler 2 Puff(s) Inhalation every 2 hours PRN Shortness of Breath and/or Wheezing  dextrose Oral Gel 15 Gram(s) Oral once PRN Blood Glucose LESS THAN 70 milliGRAM(s)/deciliter  diphenhydrAMINE 25 milliGRAM(s) Oral every 6 hours PRN Rash and/or Itching  heparin   Injectable 5000 Unit(s) IV Push every 6 hours PRN For aPTT between 40 - 57  heparin   Injectable 12406 Unit(s) IV Push every 6 hours PRN For aPTT less than 40  hydrALAZINE Injectable 10 milliGRAM(s) IV Push every 4 hours PRN for systolic bp > 160  HYDROmorphone  Injectable 1 milliGRAM(s) IV Push every 4 hours PRN breakthrough  pain  melatonin 3 milliGRAM(s) Oral at bedtime PRN Insomnia  metoprolol tartrate Injectable 5 milliGRAM(s) IV Push every 6 hours PRN heart rate sustaining greater than 130  ondansetron Injectable 4 milliGRAM(s) IV Push every 8 hours PRN Nausea and/or Vomiting  oxyCODONE    IR 5 milliGRAM(s) Oral every 6 hours PRN Moderate Pain (4 - 6)    Allergies  wool- rash, itch (Other)  adhesives (Rash)  latex (Rash)  Bactrim (Flushing)    Vital Signs Last 24 Hrs  T(C): 36.7 (29 Dec 2023 07:45), Max: 36.9 (28 Dec 2023 20:59)  T(F): 98.1 (29 Dec 2023 07:45), Max: 98.4 (28 Dec 2023 20:59)  HR: 100 (29 Dec 2023 08:30) (98 - 113)  BP: 151/92 (29 Dec 2023 07:45) (105/69 - 151/92)  BP(mean): 76 (28 Dec 2023 16:23) (76 - 76)  RR: 18 (29 Dec 2023 08:30) (18 - 20)  SpO2: 95% (29 Dec 2023 08:30) (92% - 100%)    Parameters below as of 29 Dec 2023 08:30  Patient On (Oxygen Delivery Method): nasal cannula  O2 Flow (L/min): 4    PHYSICAL EXAM:  General: No apparent distress, obese  Neck: Supple, trachea midline, no thyromegaly  Respiratory: Lungs clear bilaterally, normal rate, effort  Cardiac: +S1, S2, no m/r/g  GI: +BS, soft, non tender, non distended  Extremities: LE redness/dryness  Neuro: A+O X3, anxious  LABS:                        13.4   27.52 )-----------( 227      ( 29 Dec 2023 05:52 )             41.3     12-29    136  |  94<L>  |  69.4<H>  ----------------------------<  218<H>  5.0   |  30.0<H>  |  1.60<H>    Ca    9.3      29 Dec 2023 05:52      Urinalysis Basic - ( 29 Dec 2023 05:52 )    Color: x / Appearance: x / SG: x / pH: x  Gluc: 218 mg/dL / Ketone: x  / Bili: x / Urobili: x   Blood: x / Protein: x / Nitrite: x   Leuk Esterase: x / RBC: x / WBC x   Sq Epi: x / Non Sq Epi: x / Bacteria: x    POCT Blood Glucose.: 246 mg/dL (12-29-23 @ 12:07)  POCT Blood Glucose.: 195 mg/dL (12-29-23 @ 08:02)  POCT Blood Glucose.: 162 mg/dL (12-28-23 @ 21:50)  POCT Blood Glucose.: 277 mg/dL (12-28-23 @ 17:11)  POCT Blood Glucose.: >600 mg/dL (12-28-23 @ 17:10)    Thyroid Stimulating Hormone, Serum: 1.42 uIU/mL (12-29-23 @ 05:52)  Free Thyroxine, Serum: 1.6 ng/dL (12-29-23 @ 05:52)  Thyroid Stimulating Hormone, Serum: 0.34 uIU/mL (12-22-23 @ 04:51)  Thyroid Stimulating Hormone, Serum: 1.51 uIU/mL (12-16-23 @ 15:20)  Thyroid Stimulating Hormone, Serum: 3.02 uIU/mL (12-16-23 @ 10:18)   INTERVAL EVENTS:  Follow up diabetes management  Pt states she is going for cardiac cath later today    ROS: Feels anxious, otherwise denies chest pain or sob.     MEDICATIONS  (STANDING):  acetylcysteine  Oral Solution 1200 milliGRAM(s) Oral every 12 hours  acetylcysteine 20%  Inhalation 4 milliLiter(s) Inhalation daily  atorvastatin 80 milliGRAM(s) Oral at bedtime  budesonide  80 MICROgram(s)/formoterol 4.5 MICROgram(s) Inhaler 2 Puff(s) Inhalation two times a day  chlorhexidine 2% Cloths 1 Application(s) Topical <User Schedule>  clotrimazole 1% Cream 1 Application(s) Topical two times a day  dextrose 5%. 1000 milliLiter(s) (100 mL/Hr) IV Continuous <Continuous>  dextrose 5%. 1000 milliLiter(s) (50 mL/Hr) IV Continuous <Continuous>  dextrose 50% Injectable 12.5 Gram(s) IV Push once  dextrose 50% Injectable 25 Gram(s) IV Push once  dextrose 50% Injectable 25 Gram(s) IV Push once  famotidine    Tablet 20 milliGRAM(s) Oral daily  glucagon  Injectable 1 milliGRAM(s) IntraMuscular once  heparin  Infusion.  Unit(s)/Hr (24 mL/Hr) IV Continuous <Continuous>  insulin glargine Injectable (LANTUS) 40 Unit(s) SubCutaneous at bedtime  insulin lispro (ADMELOG) corrective regimen sliding scale   SubCutaneous three times a day before meals  insulin lispro Injectable (ADMELOG) 35 Unit(s) SubCutaneous three times a day before meals  ipratropium    for Nebulization 500 MICROGram(s) Nebulizer every 6 hours  lactulose Syrup 20 Gram(s) Oral daily  levalbuterol Inhalation 0.63 milliGRAM(s) Inhalation every 6 hours  metoprolol tartrate 75 milliGRAM(s) Oral every 8 hours  nystatin Powder 1 Application(s) Topical two times a day  oxyCODONE  ER Tablet 60 milliGRAM(s) Oral every 12 hours  polyethylene glycol 3350 17 Gram(s) Oral daily  senna 2 Tablet(s) Oral at bedtime  torsemide 20 milliGRAM(s) Oral daily    MEDICATIONS  (PRN):  acetaminophen     Tablet .. 650 milliGRAM(s) Oral every 6 hours PRN Temp greater or equal to 38C (100.4F), Mild Pain (1 - 3)  albuterol    90 MICROgram(s) HFA Inhaler 2 Puff(s) Inhalation every 2 hours PRN Shortness of Breath and/or Wheezing  dextrose Oral Gel 15 Gram(s) Oral once PRN Blood Glucose LESS THAN 70 milliGRAM(s)/deciliter  diphenhydrAMINE 25 milliGRAM(s) Oral every 6 hours PRN Rash and/or Itching  heparin   Injectable 5000 Unit(s) IV Push every 6 hours PRN For aPTT between 40 - 57  heparin   Injectable 30726 Unit(s) IV Push every 6 hours PRN For aPTT less than 40  hydrALAZINE Injectable 10 milliGRAM(s) IV Push every 4 hours PRN for systolic bp > 160  HYDROmorphone  Injectable 1 milliGRAM(s) IV Push every 4 hours PRN breakthrough  pain  melatonin 3 milliGRAM(s) Oral at bedtime PRN Insomnia  metoprolol tartrate Injectable 5 milliGRAM(s) IV Push every 6 hours PRN heart rate sustaining greater than 130  ondansetron Injectable 4 milliGRAM(s) IV Push every 8 hours PRN Nausea and/or Vomiting  oxyCODONE    IR 5 milliGRAM(s) Oral every 6 hours PRN Moderate Pain (4 - 6)    Allergies  wool- rash, itch (Other)  adhesives (Rash)  latex (Rash)  Bactrim (Flushing)    Vital Signs Last 24 Hrs  T(C): 36.7 (29 Dec 2023 07:45), Max: 36.9 (28 Dec 2023 20:59)  T(F): 98.1 (29 Dec 2023 07:45), Max: 98.4 (28 Dec 2023 20:59)  HR: 100 (29 Dec 2023 08:30) (98 - 113)  BP: 151/92 (29 Dec 2023 07:45) (105/69 - 151/92)  BP(mean): 76 (28 Dec 2023 16:23) (76 - 76)  RR: 18 (29 Dec 2023 08:30) (18 - 20)  SpO2: 95% (29 Dec 2023 08:30) (92% - 100%)    Parameters below as of 29 Dec 2023 08:30  Patient On (Oxygen Delivery Method): nasal cannula  O2 Flow (L/min): 4    PHYSICAL EXAM:  General: No apparent distress, obese  Neck: Supple, trachea midline, no thyromegaly  Respiratory: Lungs clear bilaterally, normal rate, effort  Cardiac: +S1, S2, no m/r/g  GI: +BS, soft, non tender, non distended  Extremities: LE redness/dryness  Neuro: A+O X3, anxious  LABS:                        13.4   27.52 )-----------( 227      ( 29 Dec 2023 05:52 )             41.3     12-29    136  |  94<L>  |  69.4<H>  ----------------------------<  218<H>  5.0   |  30.0<H>  |  1.60<H>    Ca    9.3      29 Dec 2023 05:52      Urinalysis Basic - ( 29 Dec 2023 05:52 )    Color: x / Appearance: x / SG: x / pH: x  Gluc: 218 mg/dL / Ketone: x  / Bili: x / Urobili: x   Blood: x / Protein: x / Nitrite: x   Leuk Esterase: x / RBC: x / WBC x   Sq Epi: x / Non Sq Epi: x / Bacteria: x    POCT Blood Glucose.: 246 mg/dL (12-29-23 @ 12:07)  POCT Blood Glucose.: 195 mg/dL (12-29-23 @ 08:02)  POCT Blood Glucose.: 162 mg/dL (12-28-23 @ 21:50)  POCT Blood Glucose.: 277 mg/dL (12-28-23 @ 17:11)  POCT Blood Glucose.: >600 mg/dL (12-28-23 @ 17:10)    Thyroid Stimulating Hormone, Serum: 1.42 uIU/mL (12-29-23 @ 05:52)  Free Thyroxine, Serum: 1.6 ng/dL (12-29-23 @ 05:52)  Thyroid Stimulating Hormone, Serum: 0.34 uIU/mL (12-22-23 @ 04:51)  Thyroid Stimulating Hormone, Serum: 1.51 uIU/mL (12-16-23 @ 15:20)  Thyroid Stimulating Hormone, Serum: 3.02 uIU/mL (12-16-23 @ 10:18)

## 2023-12-29 NOTE — PROGRESS NOTE ADULT - SUBJECTIVE AND OBJECTIVE BOX
[facility] Division of Medicine    SUBJECTIVE / OVERNIGHT EVENTS: Pt seen at the bedside.  Patient denies chest pain, SOB, abd pain, N/V, fever, chills, dysuria or any other complaints. All remainder ROS negative.     MEDICATIONS  (STANDING):  acetylcysteine  Oral Solution 1200 milliGRAM(s) Oral every 12 hours  acetylcysteine 20%  Inhalation 4 milliLiter(s) Inhalation daily  atorvastatin 80 milliGRAM(s) Oral at bedtime  budesonide  80 MICROgram(s)/formoterol 4.5 MICROgram(s) Inhaler 2 Puff(s) Inhalation two times a day  chlorhexidine 2% Cloths 1 Application(s) Topical <User Schedule>  clotrimazole 1% Cream 1 Application(s) Topical two times a day  dextrose 5%. 1000 milliLiter(s) (50 mL/Hr) IV Continuous <Continuous>  dextrose 5%. 1000 milliLiter(s) (100 mL/Hr) IV Continuous <Continuous>  dextrose 50% Injectable 25 Gram(s) IV Push once  dextrose 50% Injectable 12.5 Gram(s) IV Push once  dextrose 50% Injectable 25 Gram(s) IV Push once  famotidine    Tablet 20 milliGRAM(s) Oral daily  glucagon  Injectable 1 milliGRAM(s) IntraMuscular once  heparin  Infusion.  Unit(s)/Hr (24 mL/Hr) IV Continuous <Continuous>  insulin glargine Injectable (LANTUS) 40 Unit(s) SubCutaneous at bedtime  insulin lispro (ADMELOG) corrective regimen sliding scale   SubCutaneous three times a day before meals  insulin lispro Injectable (ADMELOG) 35 Unit(s) SubCutaneous three times a day before meals  ipratropium    for Nebulization 500 MICROGram(s) Nebulizer every 6 hours  lactulose Syrup 20 Gram(s) Oral daily  levalbuterol Inhalation 0.63 milliGRAM(s) Inhalation every 6 hours  levalbuterol Inhalation 0.63 milliGRAM(s) Inhalation once  metoprolol tartrate 75 milliGRAM(s) Oral every 8 hours  metoprolol tartrate Injectable 2.5 milliGRAM(s) IV Push once  nystatin Powder 1 Application(s) Topical two times a day  oxyCODONE  ER Tablet 60 milliGRAM(s) Oral every 12 hours  polyethylene glycol 3350 17 Gram(s) Oral daily  senna 2 Tablet(s) Oral at bedtime  torsemide 20 milliGRAM(s) Oral daily    MEDICATIONS  (PRN):  acetaminophen     Tablet .. 650 milliGRAM(s) Oral every 6 hours PRN Temp greater or equal to 38C (100.4F), Mild Pain (1 - 3)  albuterol    90 MICROgram(s) HFA Inhaler 2 Puff(s) Inhalation every 2 hours PRN Shortness of Breath and/or Wheezing  dextrose Oral Gel 15 Gram(s) Oral once PRN Blood Glucose LESS THAN 70 milliGRAM(s)/deciliter  diphenhydrAMINE 25 milliGRAM(s) Oral every 6 hours PRN Rash and/or Itching  heparin   Injectable 5000 Unit(s) IV Push every 6 hours PRN For aPTT between 40 - 57  heparin   Injectable 85176 Unit(s) IV Push every 6 hours PRN For aPTT less than 40  hydrALAZINE Injectable 10 milliGRAM(s) IV Push every 4 hours PRN for systolic bp > 160  HYDROmorphone  Injectable 1 milliGRAM(s) IV Push every 4 hours PRN breakthrough  pain  melatonin 3 milliGRAM(s) Oral at bedtime PRN Insomnia  metoprolol tartrate Injectable 5 milliGRAM(s) IV Push every 6 hours PRN heart rate sustaining greater than 130  ondansetron Injectable 4 milliGRAM(s) IV Push every 8 hours PRN Nausea and/or Vomiting  oxyCODONE    IR 5 milliGRAM(s) Oral every 6 hours PRN Moderate Pain (4 - 6)      I&O's Summary    28 Dec 2023 07:01  -  29 Dec 2023 07:00  --------------------------------------------------------  IN: 570 mL / OUT: 3200 mL / NET: -2630 mL    29 Dec 2023 07:01  -  29 Dec 2023 15:13  --------------------------------------------------------  IN: 60 mL / OUT: 1375 mL / NET: -1315 mL        PHYSICAL EXAM:  Vital Signs Last 24 Hrs  T(C): 36.6 (29 Dec 2023 14:39), Max: 36.9 (28 Dec 2023 20:59)  T(F): 97.9 (29 Dec 2023 14:39), Max: 98.4 (28 Dec 2023 20:59)  HR: 142 (29 Dec 2023 14:39) (98 - 142)  BP: 114/73 (29 Dec 2023 14:39) (105/69 - 151/92)  BP(mean): 76 (28 Dec 2023 16:23) (76 - 76)  RR: 20 (29 Dec 2023 14:39) (18 - 20)  SpO2: 95% (29 Dec 2023 14:39) (92% - 100%)    Parameters below as of 29 Dec 2023 14:39  Patient On (Oxygen Delivery Method): nasal cannula  O2 Flow (L/min): 4        GENERAL: not in acute distress, morbidly obese   HEENT:  Clear conjunctiva, PERRL, moist oral mucosa  RESP:  Non-labored breathing pattern, course lungs sounds on the right , no wheezes or crackles appreciated  CV: Regular rate and rhythm, no murmurs appreciated, +1 lower extremity edema, peripheral pulses are 2+ bilaterally  GI: Soft, non-tender, non-distended  NEURO: Awake, alert, conversant, upper and lower extremity strength and light touch sensation grossly intact but pt with significant deconditioning   PSYCH: Calm, cooperative, A&Ox3  SKIN: No rash or lesions, warm and       LABS:                        13.4   27.52 )-----------( 227      ( 29 Dec 2023 05:52 )             41.3     12-29    136  |  94<L>  |  69.4<H>  ----------------------------<  218<H>  5.0   |  30.0<H>  |  1.60<H>    Ca    9.3      29 Dec 2023 05:52      PTT - ( 29 Dec 2023 05:52 )  PTT:61.6 sec      Urinalysis Basic - ( 29 Dec 2023 05:52 )    Color: x / Appearance: x / SG: x / pH: x  Gluc: 218 mg/dL / Ketone: x  / Bili: x / Urobili: x   Blood: x / Protein: x / Nitrite: x   Leuk Esterase: x / RBC: x / WBC x   Sq Epi: x / Non Sq Epi: x / Bacteria: x        CAPILLARY BLOOD GLUCOSE      POCT Blood Glucose.: 246 mg/dL (29 Dec 2023 12:07)  POCT Blood Glucose.: 195 mg/dL (29 Dec 2023 08:02)  POCT Blood Glucose.: 162 mg/dL (28 Dec 2023 21:50)  POCT Blood Glucose.: 277 mg/dL (28 Dec 2023 17:11)  POCT Blood Glucose.: >600 mg/dL (28 Dec 2023 17:10)      IMAGING:                                   [facility] Division of Medicine    SUBJECTIVE / OVERNIGHT EVENTS: Pt seen at the bedside.  Patient denies chest pain, SOB, abd pain, N/V, fever, chills, dysuria or any other complaints. All remainder ROS negative.     MEDICATIONS  (STANDING):  acetylcysteine  Oral Solution 1200 milliGRAM(s) Oral every 12 hours  acetylcysteine 20%  Inhalation 4 milliLiter(s) Inhalation daily  atorvastatin 80 milliGRAM(s) Oral at bedtime  budesonide  80 MICROgram(s)/formoterol 4.5 MICROgram(s) Inhaler 2 Puff(s) Inhalation two times a day  chlorhexidine 2% Cloths 1 Application(s) Topical <User Schedule>  clotrimazole 1% Cream 1 Application(s) Topical two times a day  dextrose 5%. 1000 milliLiter(s) (50 mL/Hr) IV Continuous <Continuous>  dextrose 5%. 1000 milliLiter(s) (100 mL/Hr) IV Continuous <Continuous>  dextrose 50% Injectable 25 Gram(s) IV Push once  dextrose 50% Injectable 12.5 Gram(s) IV Push once  dextrose 50% Injectable 25 Gram(s) IV Push once  famotidine    Tablet 20 milliGRAM(s) Oral daily  glucagon  Injectable 1 milliGRAM(s) IntraMuscular once  heparin  Infusion.  Unit(s)/Hr (24 mL/Hr) IV Continuous <Continuous>  insulin glargine Injectable (LANTUS) 40 Unit(s) SubCutaneous at bedtime  insulin lispro (ADMELOG) corrective regimen sliding scale   SubCutaneous three times a day before meals  insulin lispro Injectable (ADMELOG) 35 Unit(s) SubCutaneous three times a day before meals  ipratropium    for Nebulization 500 MICROGram(s) Nebulizer every 6 hours  lactulose Syrup 20 Gram(s) Oral daily  levalbuterol Inhalation 0.63 milliGRAM(s) Inhalation every 6 hours  levalbuterol Inhalation 0.63 milliGRAM(s) Inhalation once  metoprolol tartrate 75 milliGRAM(s) Oral every 8 hours  metoprolol tartrate Injectable 2.5 milliGRAM(s) IV Push once  nystatin Powder 1 Application(s) Topical two times a day  oxyCODONE  ER Tablet 60 milliGRAM(s) Oral every 12 hours  polyethylene glycol 3350 17 Gram(s) Oral daily  senna 2 Tablet(s) Oral at bedtime  torsemide 20 milliGRAM(s) Oral daily    MEDICATIONS  (PRN):  acetaminophen     Tablet .. 650 milliGRAM(s) Oral every 6 hours PRN Temp greater or equal to 38C (100.4F), Mild Pain (1 - 3)  albuterol    90 MICROgram(s) HFA Inhaler 2 Puff(s) Inhalation every 2 hours PRN Shortness of Breath and/or Wheezing  dextrose Oral Gel 15 Gram(s) Oral once PRN Blood Glucose LESS THAN 70 milliGRAM(s)/deciliter  diphenhydrAMINE 25 milliGRAM(s) Oral every 6 hours PRN Rash and/or Itching  heparin   Injectable 5000 Unit(s) IV Push every 6 hours PRN For aPTT between 40 - 57  heparin   Injectable 99958 Unit(s) IV Push every 6 hours PRN For aPTT less than 40  hydrALAZINE Injectable 10 milliGRAM(s) IV Push every 4 hours PRN for systolic bp > 160  HYDROmorphone  Injectable 1 milliGRAM(s) IV Push every 4 hours PRN breakthrough  pain  melatonin 3 milliGRAM(s) Oral at bedtime PRN Insomnia  metoprolol tartrate Injectable 5 milliGRAM(s) IV Push every 6 hours PRN heart rate sustaining greater than 130  ondansetron Injectable 4 milliGRAM(s) IV Push every 8 hours PRN Nausea and/or Vomiting  oxyCODONE    IR 5 milliGRAM(s) Oral every 6 hours PRN Moderate Pain (4 - 6)      I&O's Summary    28 Dec 2023 07:01  -  29 Dec 2023 07:00  --------------------------------------------------------  IN: 570 mL / OUT: 3200 mL / NET: -2630 mL    29 Dec 2023 07:01  -  29 Dec 2023 15:13  --------------------------------------------------------  IN: 60 mL / OUT: 1375 mL / NET: -1315 mL        PHYSICAL EXAM:  Vital Signs Last 24 Hrs  T(C): 36.6 (29 Dec 2023 14:39), Max: 36.9 (28 Dec 2023 20:59)  T(F): 97.9 (29 Dec 2023 14:39), Max: 98.4 (28 Dec 2023 20:59)  HR: 142 (29 Dec 2023 14:39) (98 - 142)  BP: 114/73 (29 Dec 2023 14:39) (105/69 - 151/92)  BP(mean): 76 (28 Dec 2023 16:23) (76 - 76)  RR: 20 (29 Dec 2023 14:39) (18 - 20)  SpO2: 95% (29 Dec 2023 14:39) (92% - 100%)    Parameters below as of 29 Dec 2023 14:39  Patient On (Oxygen Delivery Method): nasal cannula  O2 Flow (L/min): 4        GENERAL: not in acute distress, morbidly obese   HEENT:  Clear conjunctiva, PERRL, moist oral mucosa  RESP:  Non-labored breathing pattern, course lungs sounds on the right , no wheezes or crackles appreciated  CV: Regular rate and rhythm, no murmurs appreciated, +1 lower extremity edema, peripheral pulses are 2+ bilaterally  GI: Soft, non-tender, non-distended  NEURO: Awake, alert, conversant, upper and lower extremity strength and light touch sensation grossly intact but pt with significant deconditioning   PSYCH: Calm, cooperative, A&Ox3  SKIN: No rash or lesions, warm and       LABS:                        13.4   27.52 )-----------( 227      ( 29 Dec 2023 05:52 )             41.3     12-29    136  |  94<L>  |  69.4<H>  ----------------------------<  218<H>  5.0   |  30.0<H>  |  1.60<H>    Ca    9.3      29 Dec 2023 05:52      PTT - ( 29 Dec 2023 05:52 )  PTT:61.6 sec      Urinalysis Basic - ( 29 Dec 2023 05:52 )    Color: x / Appearance: x / SG: x / pH: x  Gluc: 218 mg/dL / Ketone: x  / Bili: x / Urobili: x   Blood: x / Protein: x / Nitrite: x   Leuk Esterase: x / RBC: x / WBC x   Sq Epi: x / Non Sq Epi: x / Bacteria: x        CAPILLARY BLOOD GLUCOSE      POCT Blood Glucose.: 246 mg/dL (29 Dec 2023 12:07)  POCT Blood Glucose.: 195 mg/dL (29 Dec 2023 08:02)  POCT Blood Glucose.: 162 mg/dL (28 Dec 2023 21:50)  POCT Blood Glucose.: 277 mg/dL (28 Dec 2023 17:11)  POCT Blood Glucose.: >600 mg/dL (28 Dec 2023 17:10)      IMAGING:

## 2023-12-29 NOTE — PROGRESS NOTE ADULT - ASSESSMENT
69 yo female with HTN, HLD, DM2, HFpEF, CKD III, DVT, Uterine CA, COPD on home o2 who presented with complaints of shortness of breath. Patient reports that she has been sick all week and was recently started on Vantin and steroids while at the nursing home. Patient reports that her dyspnea just worsened and she told the nursing home to send her in for an evaluation. While in the ED, patient was placed on BIPAP and was given IV lasix with some improvement. RVP then results as RSV +. Patient had a CT scan which also showed pneumonia and pulmonary HTN. Patient was started on IV zosyn x 10 days as well as IV steroids tapered to po. Due to uptrending WBC and persistent o2 requirements concern for worsening pneumonia (likely expected radiographic progression) and abx broadened to vanco and golden 12/26, patient was tapered down to 5l o2 the same day ? anxiety component. BCX 12/26 ngtd, repeat UA neg. No diarrhea, phlebitis    Acute on chronic respiratory failure  Leukocytosis on steroids  RSV with superimposed bacterial pneumonia  Pulmonary hypertension  ? PE  HFPEF  CKD  COPD on home o2  Morbid obesity      patient with RSV with suspected superimposed bacterial pneumonia overall improved since admission even prior to broadening abx, now back to baseline o2 requirements- low suspicion for MRSA pneumonia  dyspnea-multifactorial. High risk for recurrent pneumonia  Leukocytosis likely reactive in the setting of steroids  procal not reliable in renal failure  stable off abx, continue to monitor  Send sputum cx if able  repeat Ct chest next week  No acute ID contraindication for rhc/lhc    will f/u  d/w  Dr Snyder

## 2023-12-29 NOTE — PROGRESS NOTE ADULT - ASSESSMENT
68 year old female with PMH of morbidly obesity (500+lbs), DM, HTN, HLD, asthma, HFpEF, chronic hypoxic respiratory failure on 4L home oxygen, CKD, spinal stenosis (cervical spine + lumbar spine) and hx of DVT presents with worsening SOB. . Nephrology is consulted for worsening JACQUES on CKD.     JACQUES on  CKD stage III  -Baseline creatinine is ~1.6- 2.0 mg/dL in Oct 2022 (as per [facility])  -On admission, creatinine was1.4  - UA bland   - No hydro on CT 11/24/23     Renal function near baseline  Cleared from renal standpoint to proceed w cardiac cath  noted saline bolus  Will premedicate w mucomyst    Acute on chronic HFpEF, NYHA III, ACC C  Bacterial pneumonia  Pulmonary HTN, Severe ( PASP 70 mmHg)  Pt hypervolemic on exam  Continue diuretics/ permissive azotemia  Component of elevated APOLONIA related to steroids  cont Torsemide 20 daily    AM labs will follow     68 year old female with PMH of morbidly obesity (500+lbs), DM, HTN, HLD, asthma, HFpEF, chronic hypoxic respiratory failure on 4L home oxygen, CKD, spinal stenosis (cervical spine + lumbar spine) and hx of DVT presents with worsening SOB. . Nephrology is consulted for worsening JACQUES on CKD.     JACQUES on  CKD stage III  -Baseline creatinine is ~1.6- 2.0 mg/dL in Oct 2022 (as per [facility])  -On admission, creatinine was1.4  - UA bland   - No hydro on CT 11/24/23     Renal function near baseline  Cleared from renal standpoint to proceed w cardiac cath  noted saline bolus  Will premedicate w mucomyst    Acute on chronic HFpEF, NYHA III, ACC C  Bacterial pneumonia  Pulmonary HTN, Severe ( PASP 70 mmHg)  Pt hypervolemic on exam  Continue diuretics/ permissive azotemia  Component of elevated APOLONIA related to steroids  cont Torsemide 20 daily    AM labs will follow

## 2023-12-29 NOTE — PROGRESS NOTE ADULT - SUBJECTIVE AND OBJECTIVE BOX
Department of Cardiology                                                                  [facility]/[place] [address] [address]                                                            Telephone: [phone]. Fax:[phone]                                                                                      Cardiac Cath NP Note           Patient is a 68y old  Female who presents with a chief complaint of SOB (29 Dec 2023 15:26)      Overnight events: None    Plan: R/ [facility]     HPI:    69 y/o female with hx of HFpEF (EF 55-60% 2023), on home O2 4L, pulmonary HTN, morbid obesity, asthma, CKD3, IDDM2, uterine cancer s/p MEGAN, DVT LLE (), chronic leg edema 2/2 venous stasis, spinal stenosis with chronic back pain who presents with midsternal chest pressure and shortness of breath. Patient has had multiple hospitalizations since 2023. She had hospitalization -23 for acute on chronic decompensated HFpEF with possible presumed PE and severe pulmonary hypertension (PASP of 70 mmHg). During that admission, she was profoundly volume overloaded requiring IV diuresis; elevated DDimer 407; US lower extremity edema: Markedly limited visualization. Unable to perform compression. No evidence of deep venous thrombosis in either lower extremity. +trop .09-> .08-> .06. TTE during that time: LVEF 55-60%; moderate RVE and moderately reduced RVSF; PASP 70.9mmHg. Due to patient's weight and renal function, ischemic testing cath/ NST was precluded. Patient was ultimately discharged home on oral diuresis and eliquis 5mg PO BID.   Patient had admission to [facility] -23 for complaints of rectal pain; constipation and no reported BM x 2 weeks. CT scan consistent for  constipation Rectal pain may also be d/t anal fissure vs thrombosed hemorrhoids, started on bowel regimen and nitroglycerin  ointment rectally BID for possible fissure vs thrombosed hemorrhoids. Colorectal surgery consulted & rec outpatient follow up. During her stay, she had an acute UTI treated with Ceftriaxone x 3 days. Patient was recommended to being discharged to [facility] but refused was ultimately discharged home on 23.   Patient had readmission back to [facility] on 23 for b/l LE weakness, legs giving out while walking and almost falling. She was admitted for ROSE placement and ultimately discharged on 10/4/23.   he presented to [facility] 23 for severe rectal pain with defecation evaluated by colorectal surgery in which exam was consistent with anal fissure. CT A/P w/o acute pathology; no acute surgical intervention; SITZ bath TID, Miralax/Senna, Lidocaine Cr post BM, Nitroglycerin ointment BID. Pt also with new wound anterior right ankle area with no signs of infection and no signs of cellulitis.  Seen by podiatry, Rx betadine with gauze and yasir right ankle. Patient was ultimately discharged home on .   On  23 patient presents from [facility]. She states she had multiple episodes of non radiating mid chest pressure and shortness of breath at rest.    Patient reports that her dyspnea just worsened and she told the nursing home to send her in for an evaluation. While in the ED, patient was placed on BIPAP and was given IV lasix with some improvement. RVP then results as RSV +. Patient had a CT scan which also showed pneumonia and pulmonary HTN. hospital course C/B New Afib,  on lopressor 75mg po q8 hourly and lopressor 5mg ivp PRN, echo w/ preserved EF, - c/w heparin drip for [id number] 6   pt remains afebrile and resp status improving, weaned off from BIPAP to now on NC , BIPAP HS    CT chest reads worsening middle lobe PNA, WBC likely reactive to steroids, S/P Abx, - repeat blood clx NGTD  Patient now presents to [facility] CCL for Right nad left heart cath to assess her right heart pressures and to evaluate Coronary anatomy with possible intervention in setting of CP, HFPef with SOB          PAST MEDICAL & SURGICAL HISTORY:  Diabetes Mellitus Type II      HTN (Hypertension)      Endometrial Hyperplasia      Cervical Stenosis of Spine      Spinal Stenosis, Lumbar      Deep Vein Thrombosis (DVT)  Left leg, , treated and resolved      Dyslipidemia      Cataract      Morbid Obesity      Vitamin D deficiency      Insomnia      CKD (chronic kidney disease)  ~ III      Congestive heart failure  ~ HFpEF      Uterine cancer      Asthma      On home oxygen therapy      Gait difficulty  ~ u ses walker      Cataract extracted with lens implant   Right      C Section       Cholecystectomy/appendectomy @ age 25      D&C x2       D&C 2008  hysteroscopy, endometrial hyperplasia, 2009      Cervical Spinal Stenosis surgery x2 (2002, 2002)      Tonsillectomy as a child      Endometrial biopsy 09      H/O laser iridotomy  left eye,       H/O colonoscopy        S/P total abdominal hysterectomy and bilateral salpingo-oophorectomy      S/P appendectomy      S/P cholecystectomy          RADIOLOGY & ADDITIONAL STUDIES/DIAGNOSTIC TESTING:      ECHO  FINDINGS:    < from: TTE W or WO Ultrasound Enhancing Agent (23 @ 13:36) >   1. Technically difficult image quality.   2. Normal biventricular systolic function.   3. Compared to the transthoracic echocardiogram performed on 2023.    ________________________________________________________________________________________  FINDINGS:     Left Ventricle:  The left ventricle was not well visualized. The left ventricular cavity is normal. Left ventricular wall thickness is normal. Left ventricular systolic function is normal with an ejection fraction visually estimated at 60 to 65%. There is moderate (grade 2) left ventricular diastolic dysfunction, with normal filling pressure.     Right Ventricle:  The right ventricle is not well visualized. The right ventricular cavity is normal in size, normal wall thickness and normal systolic function.     Left Atrium:  The left atrium was not well visualized, and the LA volume could not be calculated. The left atrium is normal.     Right Atrium:  The right atrium was not well visualized. The right atrium is normal in size.    < end of copied text >      STRESS  FINDINGS: na    CATHETERIZATION  FINDINGS: none in the past      Allergies    wool- rash, itch (Other)  adhesives (Rash)  latex (Rash)  Bactrim (Flushing)    Intolerances        MEDICATIONS  (STANDING):  acetylcysteine  Oral Solution 1200 milliGRAM(s) Oral every 12 hours  acetylcysteine 20%  Inhalation 4 milliLiter(s) Inhalation daily  atorvastatin 80 milliGRAM(s) Oral at bedtime  budesonide  80 MICROgram(s)/formoterol 4.5 MICROgram(s) Inhaler 2 Puff(s) Inhalation two times a day  chlorhexidine 2% Cloths 1 Application(s) Topical <User Schedule>  clotrimazole 1% Cream 1 Application(s) Topical two times a day  dextrose 5%. 1000 milliLiter(s) (100 mL/Hr) IV Continuous <Continuous>  dextrose 5%. 1000 milliLiter(s) (50 mL/Hr) IV Continuous <Continuous>  dextrose 50% Injectable 25 Gram(s) IV Push once  dextrose 50% Injectable 12.5 Gram(s) IV Push once  dextrose 50% Injectable 25 Gram(s) IV Push once  famotidine    Tablet 20 milliGRAM(s) Oral daily  glucagon  Injectable 1 milliGRAM(s) IntraMuscular once  heparin  Infusion.  Unit(s)/Hr (24 mL/Hr) IV Continuous <Continuous>  insulin glargine Injectable (LANTUS) 40 Unit(s) SubCutaneous at bedtime  insulin lispro (ADMELOG) corrective regimen sliding scale   SubCutaneous three times a day before meals  insulin lispro Injectable (ADMELOG) 35 Unit(s) SubCutaneous three times a day before meals  ipratropium    for Nebulization 500 MICROGram(s) Nebulizer every 6 hours  lactulose Syrup 20 Gram(s) Oral daily  levalbuterol Inhalation 0.63 milliGRAM(s) Inhalation once  levalbuterol Inhalation 0.63 milliGRAM(s) Inhalation every 6 hours  metoprolol tartrate 75 milliGRAM(s) Oral every 8 hours  metoprolol tartrate Injectable 2.5 milliGRAM(s) IV Push once  nystatin Powder 1 Application(s) Topical two times a day  oxyCODONE  ER Tablet 60 milliGRAM(s) Oral every 12 hours  polyethylene glycol 3350 17 Gram(s) Oral daily  senna 2 Tablet(s) Oral at bedtime  torsemide 20 milliGRAM(s) Oral daily    MEDICATIONS  (PRN):  acetaminophen     Tablet .. 650 milliGRAM(s) Oral every 6 hours PRN Temp greater or equal to 38C (100.4F), Mild Pain (1 - 3)  albuterol    90 MICROgram(s) HFA Inhaler 2 Puff(s) Inhalation every 2 hours PRN Shortness of Breath and/or Wheezing  dextrose Oral Gel 15 Gram(s) Oral once PRN Blood Glucose LESS THAN 70 milliGRAM(s)/deciliter  diphenhydrAMINE 25 milliGRAM(s) Oral every 6 hours PRN Rash and/or Itching  heparin   Injectable 86265 Unit(s) IV Push every 6 hours PRN For aPTT less than 40  heparin   Injectable 5000 Unit(s) IV Push every 6 hours PRN For aPTT between 40 - 57  hydrALAZINE Injectable 10 milliGRAM(s) IV Push every 4 hours PRN for systolic bp > 160  HYDROmorphone  Injectable 1 milliGRAM(s) IV Push every 4 hours PRN breakthrough  pain  melatonin 3 milliGRAM(s) Oral at bedtime PRN Insomnia  metoprolol tartrate Injectable 5 milliGRAM(s) IV Push every 6 hours PRN heart rate sustaining greater than 130  ondansetron Injectable 4 milliGRAM(s) IV Push every 8 hours PRN Nausea and/or Vomiting  oxyCODONE    IR 5 milliGRAM(s) Oral every 6 hours PRN Moderate Pain (4 - 6)      FAMILY HISTORY:  Family history of pancreatic cancer    Family history of bladder cancer    Family history of lung cancer (Grandparent)        SOCIAL HISTORY:    CIGARETTES:    ALCOHOL:    REVIEW OF SYSTEMS:  General: No fevers/chills, or fatigue  HEENT: No visual disturbances, no hearing loss, no headaches, no epistaxis.  CV: No chest pain,no palpitations, no edema, no orthopnea, no PND, or LEGER  Respiratory: No dyspnea, no wheeze, no cough.  GI: no nausea/vomiting, no black/bloody stools.  : No hematuria  Musculoskeletal: No myalgias, no arthralgias, no back pain.    Vital Signs Last 24 Hrs  T(C): 36.6 (29 Dec 2023 14:39), Max: 36.9 (28 Dec 2023 20:59)  T(F): 97.9 (29 Dec 2023 14:39), Max: 98.4 (28 Dec 2023 20:59)  HR: 142 (29 Dec 2023 14:39) (98 - 142)  BP: 114/73 (29 Dec 2023 14:39) (105/69 - 151/92)  BP(mean): 76 (28 Dec 2023 16:23) (76 - 76)  RR: 20 (29 Dec 2023 14:39) (18 - 20)  SpO2: 95% (29 Dec 2023 14:39) (92% - 97%)    Parameters below as of 29 Dec 2023 14:39  Patient On (Oxygen Delivery Method): nasal cannula  O2 Flow (L/min): 4      Daily     Daily     I&O's Detail    28 Dec 2023 07:01  -  29 Dec 2023 07:00  --------------------------------------------------------  IN:    Heparin Infusion: 200 mL    Oral Fluid: 120 mL    Sodium Chloride 0.9% Bolus: 250 mL  Total IN: 570 mL    OUT:    Indwelling Catheter - Urethral (mL): 900 mL    Voided (mL): 2300 mL  Total OUT: 3200 mL    Total NET: -2630 mL      29 Dec 2023 07:01  -  29 Dec 2023 15:59  --------------------------------------------------------  IN:    Heparin Infusion: 60 mL  Total IN: 60 mL    OUT:    Indwelling Catheter - Urethral (mL): 1375 mL  Total OUT: 1375 mL    Total NET: -1315 mL          PHYSICAL EXAM:   GENERAL: Morbidly obese, cough, lying in bed comfortably  ENMT: PERRL, +EOMI.  NECK: soft, Supple, No JVD,   CHEST/LUNG: Clear to auscultatation bilaterally; No wheezing.  HEART: S1S2+, Regular rate and rhythm; No murmurs.  ABDOMEN: Soft, Nontender, Nondistended; Bowel sounds present.  MUSCULOSKELETAL: Normal range of motion.  SKIN: No rashes or lesions.  NEURO: AAOX3, no focal deficits, no motor r sensory loss.  PSYCH: normal mood.  VASCULAR:   Radial +2 R/+2 L  Femoral +2 R/+2 L  PT +2 R/+2 L  DP +2 R/+2 L      INTERPRETATION OF TELEMETRY:    EC23 SR, ST ABNORMALITIES IN LEADS II AND AVL    LABS:                        13.4   27.52 )-----------( 227      ( 29 Dec 2023 05:52 )             41.3         136  |  94<L>  |  69.4<H>  ----------------------------<  218<H>  5.0   |  30.0<H>  |  1.60<H>    Ca    9.3      29 Dec 2023 05:52          PTT - ( 29 Dec 2023 05:52 )  PTT:61.6 sec  Urinalysis Basic - ( 29 Dec 2023 05:52 )    Color: x / Appearance: x / SG: x / pH: x  Gluc: 218 mg/dL / Ketone: x  / Bili: x / Urobili: x   Blood: x / Protein: x / Nitrite: x   Leuk Esterase: x / RBC: x / WBC x   Sq Epi: x / Non Sq Epi: x / Bacteria: x      I&O's Summary    28 Dec 2023 07:01  -  29 Dec 2023 07:00  --------------------------------------------------------  IN: 570 mL / OUT: 3200 mL / NET: -2630 mL    29 Dec 2023 07:01  -  29 Dec 2023 15:59  --------------------------------------------------------  IN: 60 mL / OUT: 1375 mL / NET: -1315 mL      BNP: 712                                                                                         Department of Cardiology                                                                  [facility]/[place] [address] [address]                                                            Telephone: [phone]. Fax:[phone]                                                                                      Cardiac Cath NP Note           Patient is a 68y old  Female who presents with a chief complaint of SOB (29 Dec 2023 15:26)      Overnight events: None    Plan: R/ [facility]     HPI:    69 y/o female with hx of HFpEF (EF 55-60% 2023), on home O2 4L, pulmonary HTN, morbid obesity, asthma, CKD3, IDDM2, uterine cancer s/p MEGAN, DVT LLE (), chronic leg edema 2/2 venous stasis, spinal stenosis with chronic back pain who presents with midsternal chest pressure and shortness of breath. Patient has had multiple hospitalizations since 2023. She had hospitalization -23 for acute on chronic decompensated HFpEF with possible presumed PE and severe pulmonary hypertension (PASP of 70 mmHg). During that admission, she was profoundly volume overloaded requiring IV diuresis; elevated DDimer 407; US lower extremity edema: Markedly limited visualization. Unable to perform compression. No evidence of deep venous thrombosis in either lower extremity. +trop .09-> .08-> .06. TTE during that time: LVEF 55-60%; moderate RVE and moderately reduced RVSF; PASP 70.9mmHg. Due to patient's weight and renal function, ischemic testing cath/ NST was precluded. Patient was ultimately discharged home on oral diuresis and eliquis 5mg PO BID.   Patient had admission to [facility] -23 for complaints of rectal pain; constipation and no reported BM x 2 weeks. CT scan consistent for  constipation Rectal pain may also be d/t anal fissure vs thrombosed hemorrhoids, started on bowel regimen and nitroglycerin  ointment rectally BID for possible fissure vs thrombosed hemorrhoids. Colorectal surgery consulted & rec outpatient follow up. During her stay, she had an acute UTI treated with Ceftriaxone x 3 days. Patient was recommended to being discharged to [facility] but refused was ultimately discharged home on 23.   Patient had readmission back to [facility] on 23 for b/l LE weakness, legs giving out while walking and almost falling. She was admitted for ROSE placement and ultimately discharged on 10/4/23.   he presented to [facility] 23 for severe rectal pain with defecation evaluated by colorectal surgery in which exam was consistent with anal fissure. CT A/P w/o acute pathology; no acute surgical intervention; SITZ bath TID, Miralax/Senna, Lidocaine Cr post BM, Nitroglycerin ointment BID. Pt also with new wound anterior right ankle area with no signs of infection and no signs of cellulitis.  Seen by podiatry, Rx betadine with gauze and yasir right ankle. Patient was ultimately discharged home on .   On  23 patient presents from [facility]. She states she had multiple episodes of non radiating mid chest pressure and shortness of breath at rest.    Patient reports that her dyspnea just worsened and she told the nursing home to send her in for an evaluation. While in the ED, patient was placed on BIPAP and was given IV lasix with some improvement. RVP then results as RSV +. Patient had a CT scan which also showed pneumonia and pulmonary HTN. hospital course C/B New Afib,  on lopressor 75mg po q8 hourly and lopressor 5mg ivp PRN, echo w/ preserved EF, - c/w heparin drip for [id number] 6   pt remains afebrile and resp status improving, weaned off from BIPAP to now on NC , BIPAP HS    CT chest reads worsening middle lobe PNA, WBC likely reactive to steroids, S/P Abx, - repeat blood clx NGTD  Patient now presents to [facility] CCL for Right nad left heart cath to assess her right heart pressures and to evaluate Coronary anatomy with possible intervention in setting of CP, HFPef with SOB          PAST MEDICAL & SURGICAL HISTORY:  Diabetes Mellitus Type II      HTN (Hypertension)      Endometrial Hyperplasia      Cervical Stenosis of Spine      Spinal Stenosis, Lumbar      Deep Vein Thrombosis (DVT)  Left leg, , treated and resolved      Dyslipidemia      Cataract      Morbid Obesity      Vitamin D deficiency      Insomnia      CKD (chronic kidney disease)  ~ III      Congestive heart failure  ~ HFpEF      Uterine cancer      Asthma      On home oxygen therapy      Gait difficulty  ~ u ses walker      Cataract extracted with lens implant   Right      C Section       Cholecystectomy/appendectomy @ age 25      D&C x2       D&C 2008  hysteroscopy, endometrial hyperplasia, 2009      Cervical Spinal Stenosis surgery x2 (2002, 2002)      Tonsillectomy as a child      Endometrial biopsy 09      H/O laser iridotomy  left eye,       H/O colonoscopy        S/P total abdominal hysterectomy and bilateral salpingo-oophorectomy      S/P appendectomy      S/P cholecystectomy          RADIOLOGY & ADDITIONAL STUDIES/DIAGNOSTIC TESTING:      ECHO  FINDINGS:    < from: TTE W or WO Ultrasound Enhancing Agent (23 @ 13:36) >   1. Technically difficult image quality.   2. Normal biventricular systolic function.   3. Compared to the transthoracic echocardiogram performed on 2023.    ________________________________________________________________________________________  FINDINGS:     Left Ventricle:  The left ventricle was not well visualized. The left ventricular cavity is normal. Left ventricular wall thickness is normal. Left ventricular systolic function is normal with an ejection fraction visually estimated at 60 to 65%. There is moderate (grade 2) left ventricular diastolic dysfunction, with normal filling pressure.     Right Ventricle:  The right ventricle is not well visualized. The right ventricular cavity is normal in size, normal wall thickness and normal systolic function.     Left Atrium:  The left atrium was not well visualized, and the LA volume could not be calculated. The left atrium is normal.     Right Atrium:  The right atrium was not well visualized. The right atrium is normal in size.    < end of copied text >      STRESS  FINDINGS: na    CATHETERIZATION  FINDINGS: none in the past      Allergies    wool- rash, itch (Other)  adhesives (Rash)  latex (Rash)  Bactrim (Flushing)    Intolerances        MEDICATIONS  (STANDING):  acetylcysteine  Oral Solution 1200 milliGRAM(s) Oral every 12 hours  acetylcysteine 20%  Inhalation 4 milliLiter(s) Inhalation daily  atorvastatin 80 milliGRAM(s) Oral at bedtime  budesonide  80 MICROgram(s)/formoterol 4.5 MICROgram(s) Inhaler 2 Puff(s) Inhalation two times a day  chlorhexidine 2% Cloths 1 Application(s) Topical <User Schedule>  clotrimazole 1% Cream 1 Application(s) Topical two times a day  dextrose 5%. 1000 milliLiter(s) (100 mL/Hr) IV Continuous <Continuous>  dextrose 5%. 1000 milliLiter(s) (50 mL/Hr) IV Continuous <Continuous>  dextrose 50% Injectable 25 Gram(s) IV Push once  dextrose 50% Injectable 12.5 Gram(s) IV Push once  dextrose 50% Injectable 25 Gram(s) IV Push once  famotidine    Tablet 20 milliGRAM(s) Oral daily  glucagon  Injectable 1 milliGRAM(s) IntraMuscular once  heparin  Infusion.  Unit(s)/Hr (24 mL/Hr) IV Continuous <Continuous>  insulin glargine Injectable (LANTUS) 40 Unit(s) SubCutaneous at bedtime  insulin lispro (ADMELOG) corrective regimen sliding scale   SubCutaneous three times a day before meals  insulin lispro Injectable (ADMELOG) 35 Unit(s) SubCutaneous three times a day before meals  ipratropium    for Nebulization 500 MICROGram(s) Nebulizer every 6 hours  lactulose Syrup 20 Gram(s) Oral daily  levalbuterol Inhalation 0.63 milliGRAM(s) Inhalation once  levalbuterol Inhalation 0.63 milliGRAM(s) Inhalation every 6 hours  metoprolol tartrate 75 milliGRAM(s) Oral every 8 hours  metoprolol tartrate Injectable 2.5 milliGRAM(s) IV Push once  nystatin Powder 1 Application(s) Topical two times a day  oxyCODONE  ER Tablet 60 milliGRAM(s) Oral every 12 hours  polyethylene glycol 3350 17 Gram(s) Oral daily  senna 2 Tablet(s) Oral at bedtime  torsemide 20 milliGRAM(s) Oral daily    MEDICATIONS  (PRN):  acetaminophen     Tablet .. 650 milliGRAM(s) Oral every 6 hours PRN Temp greater or equal to 38C (100.4F), Mild Pain (1 - 3)  albuterol    90 MICROgram(s) HFA Inhaler 2 Puff(s) Inhalation every 2 hours PRN Shortness of Breath and/or Wheezing  dextrose Oral Gel 15 Gram(s) Oral once PRN Blood Glucose LESS THAN 70 milliGRAM(s)/deciliter  diphenhydrAMINE 25 milliGRAM(s) Oral every 6 hours PRN Rash and/or Itching  heparin   Injectable 52985 Unit(s) IV Push every 6 hours PRN For aPTT less than 40  heparin   Injectable 5000 Unit(s) IV Push every 6 hours PRN For aPTT between 40 - 57  hydrALAZINE Injectable 10 milliGRAM(s) IV Push every 4 hours PRN for systolic bp > 160  HYDROmorphone  Injectable 1 milliGRAM(s) IV Push every 4 hours PRN breakthrough  pain  melatonin 3 milliGRAM(s) Oral at bedtime PRN Insomnia  metoprolol tartrate Injectable 5 milliGRAM(s) IV Push every 6 hours PRN heart rate sustaining greater than 130  ondansetron Injectable 4 milliGRAM(s) IV Push every 8 hours PRN Nausea and/or Vomiting  oxyCODONE    IR 5 milliGRAM(s) Oral every 6 hours PRN Moderate Pain (4 - 6)      FAMILY HISTORY:  Family history of pancreatic cancer    Family history of bladder cancer    Family history of lung cancer (Grandparent)        SOCIAL HISTORY:    CIGARETTES:    ALCOHOL:    REVIEW OF SYSTEMS:  General: No fevers/chills, or fatigue  HEENT: No visual disturbances, no hearing loss, no headaches, no epistaxis.  CV: No chest pain,no palpitations, no edema, no orthopnea, no PND, or LEGER  Respiratory: No dyspnea, no wheeze, no cough.  GI: no nausea/vomiting, no black/bloody stools.  : No hematuria  Musculoskeletal: No myalgias, no arthralgias, no back pain.    Vital Signs Last 24 Hrs  T(C): 36.6 (29 Dec 2023 14:39), Max: 36.9 (28 Dec 2023 20:59)  T(F): 97.9 (29 Dec 2023 14:39), Max: 98.4 (28 Dec 2023 20:59)  HR: 142 (29 Dec 2023 14:39) (98 - 142)  BP: 114/73 (29 Dec 2023 14:39) (105/69 - 151/92)  BP(mean): 76 (28 Dec 2023 16:23) (76 - 76)  RR: 20 (29 Dec 2023 14:39) (18 - 20)  SpO2: 95% (29 Dec 2023 14:39) (92% - 97%)    Parameters below as of 29 Dec 2023 14:39  Patient On (Oxygen Delivery Method): nasal cannula  O2 Flow (L/min): 4      Daily     Daily     I&O's Detail    28 Dec 2023 07:01  -  29 Dec 2023 07:00  --------------------------------------------------------  IN:    Heparin Infusion: 200 mL    Oral Fluid: 120 mL    Sodium Chloride 0.9% Bolus: 250 mL  Total IN: 570 mL    OUT:    Indwelling Catheter - Urethral (mL): 900 mL    Voided (mL): 2300 mL  Total OUT: 3200 mL    Total NET: -2630 mL      29 Dec 2023 07:01  -  29 Dec 2023 15:59  --------------------------------------------------------  IN:    Heparin Infusion: 60 mL  Total IN: 60 mL    OUT:    Indwelling Catheter - Urethral (mL): 1375 mL  Total OUT: 1375 mL    Total NET: -1315 mL          PHYSICAL EXAM:   GENERAL: Morbidly obese, cough, lying in bed comfortably  ENMT: PERRL, +EOMI.  NECK: soft, Supple, No JVD,   CHEST/LUNG: Clear to auscultatation bilaterally; No wheezing.  HEART: S1S2+, Regular rate and rhythm; No murmurs.  ABDOMEN: Soft, Nontender, Nondistended; Bowel sounds present.  MUSCULOSKELETAL: Normal range of motion.  SKIN: No rashes or lesions.  NEURO: AAOX3, no focal deficits, no motor r sensory loss.  PSYCH: normal mood.  VASCULAR:   Radial +2 R/+2 L  Femoral +2 R/+2 L  PT +2 R/+2 L  DP +2 R/+2 L      INTERPRETATION OF TELEMETRY:    EC23 SR, ST ABNORMALITIES IN LEADS II AND AVL    LABS:                        13.4   27.52 )-----------( 227      ( 29 Dec 2023 05:52 )             41.3         136  |  94<L>  |  69.4<H>  ----------------------------<  218<H>  5.0   |  30.0<H>  |  1.60<H>    Ca    9.3      29 Dec 2023 05:52          PTT - ( 29 Dec 2023 05:52 )  PTT:61.6 sec  Urinalysis Basic - ( 29 Dec 2023 05:52 )    Color: x / Appearance: x / SG: x / pH: x  Gluc: 218 mg/dL / Ketone: x  / Bili: x / Urobili: x   Blood: x / Protein: x / Nitrite: x   Leuk Esterase: x / RBC: x / WBC x   Sq Epi: x / Non Sq Epi: x / Bacteria: x      I&O's Summary    28 Dec 2023 07:01  -  29 Dec 2023 07:00  --------------------------------------------------------  IN: 570 mL / OUT: 3200 mL / NET: -2630 mL    29 Dec 2023 07:01  -  29 Dec 2023 15:59  --------------------------------------------------------  IN: 60 mL / OUT: 1375 mL / NET: -1315 mL      BNP: 712

## 2023-12-29 NOTE — DISCHARGE NOTE PROVIDER - NPI NUMBER (FOR SYSADMIN USE ONLY) :
[[id number]],[[id number]] [[id number]],[[id number]] [[id number]],[[id number]],[UNKNOWN] [[id number]],[[id number]],[UNKNOWN]

## 2023-12-29 NOTE — PROGRESS NOTE ADULT - ASSESSMENT
67 y/o female with hx of HFpEF (EF 55-60% 8/2023), on home O2 4L, pulmonary HTN, morbid obesity, asthma, CKD3, IDDM2, uterine cancer s/p MEGAN, DVT LLE (2004), chronic leg edema 2/2 venous stasis, spinal stenosis with chronic back pain who presents with midsternal chest pressure and shortness of breath. Patient has had multiple hospitalizations since September 2023. She had hospitalization 8/30-9/13/23 for acute on chronic decompensated HFpEF with possible presumed PE and severe pulmonary hypertension (PASP of 70 mmHg). During that admission, she was profoundly volume overloaded requiring IV diuresis; elevated DDimer 407; US lower extremity edema: Markedly limited visualization. Unable to perform compression. No evidence of deep venous thrombosis in either lower extremity. +trop .09-> .08-> .06. TTE during that time: LVEF 55-60%; moderate RVE and moderately reduced RVSF; PASP 70.9mmHg. Due to patient's weight and renal function, ischemic testing cath/ NST was precluded. Patient was ultimately discharged home on oral diuresis and eliquis 5mg PO BID.   Patient had admission to [facility] 9/18-9/29/23 for complaints of rectal pain; constipation and no reported BM x 2 weeks. CT scan consistent for  constipation Rectal pain may also be d/t anal fissure vs thrombosed hemorrhoids, started on bowel regimen and nitroglycerin  ointment rectally BID for possible fissure vs thrombosed hemorrhoids. Colorectal surgery consulted & rec outpatient follow up. During her stay, she had an acute UTI treated with Ceftriaxone x 3 days. Patient was recommended to being discharged to [facility] but refused was ultimately discharged home on 9/29/23.   Patient had readmission back to [facility] on 9/29/23 for b/l LE weakness, legs giving out while walking and almost falling. She was admitted for [facility] placement and ultimately discharged on 10/4/23.   he presented to [facility] 11/24/23 for severe rectal pain with defecation evaluated by colorectal surgery in which exam was consistent with anal fissure. CT A/P w/o acute pathology; no acute surgical intervention; SITZ bath TID, Miralax/Senna, Lidocaine Cr post BM, Nitroglycerin ointment BID. Pt also with new wound anterior right ankle area with no signs of infection and no signs of cellulitis.  Seen by podiatry, Rx betadine with gauze and yasir right ankle. Patient was ultimately discharged home on 12/1.   On  12/16/23 patient presents from [facility]. She states she had multiple episodes of non radiating mid chest pressure and shortness of breath at rest.    Patient reports that her dyspnea just worsened and she told the nursing home to send her in for an evaluation. While in the ED, patient was placed on BIPAP and was given IV lasix with some improvement. RVP then results as RSV +. Patient had a CT scan which also showed pneumonia and pulmonary HTN. hospital course C/B New Afib,  on lopressor 75mg po q8 hourly and lopressor 5mg ivp PRN, echo w/ preserved EF, - c/w heparin drip for [id number]   pt remains afebrile and resp status improving, weaned off from BIPAP to now on NC , BIPAP HS    CT chest reads worsening middle lobe PNA, WBC likely reactive to steroids, S/P Abx, - repeat blood clx NGTD  Patient now presents to [facility] for Right nad left heart cath to assess her right heart pressures and to evaluate Coronary anatomy with possible intervention in setting of CP, HFPef with SOB    Patient now s/p Right and  left heart catheterization via  RBV and RRA  approach (RRB and RBV # 6 F sheath in place) with Dr. Dang,    Intraprocedurally:   Patient received IV Heparin 5,000 units and IV sedation     Findings:     Prelim cath findings as below:    S/P Diagnostic   LHC    Mild LAD disease    S/P RHC  Right heart hemodynamics s below:  PA: 67/25/40  RV: 67/2/13  RA: 16/16/13  LV: 123/9/16  PA SAT: 53.8%  AO SAT: 86.8^  CO: 5.27  CI: 2.0      Official cath report pending    # s/p LHC /mILD LAD disease/ Severe pulmonary HTN/ LVEDP stable      -ADMIT due to: / Pt is already in-patient secondary to AHRF   -post cardiac cath orders  -REMOVE RRB AND RBV IN 30 Minutes  to 1 hour POST PROCEDURE  -Right radial and brachial  precautions  -bedrest x 1 hours post procedure post band removal  -NS 0.9% 250ml/hr x 1 bolus: post procedure KIERRA ppx , received in procedure room , will not order more fluids in setting of HF   -continue current medical therapy INCLUDING  Discontinue Heparin gtt  START Xarelto 15mg po daily in 6 hours after removal of RRB AND RBV SHEATH if RUE benign  -C/W Statin and other current medication regimebn, further cardiac medication regimen management per cardiology  -follow up outpt in 2 weeks with Cardiologist DR Madsen  -[facility] following during hospitalization  -Lifestyle modifications discussed to reduce cardiovascular risk factors including weight reduction, smoking cessation, medication compliance, and routine follow up with Cardiologist to track your BMI, cholesterol, and glucose levels.   - Patient is not a candidate for cardiac rehab secondary to diagnostic cath  Further -Management per Hospitalist / cardiology  -transfer patient to tele unit on monitor once criteria met  -Discussed with DR Dang, DR Madsen, Hospitalist     67 y/o female with hx of HFpEF (EF 55-60% 8/2023), on home O2 4L, pulmonary HTN, morbid obesity, asthma, CKD3, IDDM2, uterine cancer s/p MEGAN, DVT LLE (2004), chronic leg edema 2/2 venous stasis, spinal stenosis with chronic back pain who presents with midsternal chest pressure and shortness of breath. Patient has had multiple hospitalizations since September 2023. She had hospitalization 8/30-9/13/23 for acute on chronic decompensated HFpEF with possible presumed PE and severe pulmonary hypertension (PASP of 70 mmHg). During that admission, she was profoundly volume overloaded requiring IV diuresis; elevated DDimer 407; US lower extremity edema: Markedly limited visualization. Unable to perform compression. No evidence of deep venous thrombosis in either lower extremity. +trop .09-> .08-> .06. TTE during that time: LVEF 55-60%; moderate RVE and moderately reduced RVSF; PASP 70.9mmHg. Due to patient's weight and renal function, ischemic testing cath/ NST was precluded. Patient was ultimately discharged home on oral diuresis and eliquis 5mg PO BID.   Patient had admission to [facility] 9/18-9/29/23 for complaints of rectal pain; constipation and no reported BM x 2 weeks. CT scan consistent for  constipation Rectal pain may also be d/t anal fissure vs thrombosed hemorrhoids, started on bowel regimen and nitroglycerin  ointment rectally BID for possible fissure vs thrombosed hemorrhoids. Colorectal surgery consulted & rec outpatient follow up. During her stay, she had an acute UTI treated with Ceftriaxone x 3 days. Patient was recommended to being discharged to [facility] but refused was ultimately discharged home on 9/29/23.   Patient had readmission back to [facility] on 9/29/23 for b/l LE weakness, legs giving out while walking and almost falling. She was admitted for [facility] placement and ultimately discharged on 10/4/23.   he presented to [facility] 11/24/23 for severe rectal pain with defecation evaluated by colorectal surgery in which exam was consistent with anal fissure. CT A/P w/o acute pathology; no acute surgical intervention; SITZ bath TID, Miralax/Senna, Lidocaine Cr post BM, Nitroglycerin ointment BID. Pt also with new wound anterior right ankle area with no signs of infection and no signs of cellulitis.  Seen by podiatry, Rx betadine with gauze and yasir right ankle. Patient was ultimately discharged home on 12/1.   On  12/16/23 patient presents from [facility]. She states she had multiple episodes of non radiating mid chest pressure and shortness of breath at rest.    Patient reports that her dyspnea just worsened and she told the nursing home to send her in for an evaluation. While in the ED, patient was placed on BIPAP and was given IV lasix with some improvement. RVP then results as RSV +. Patient had a CT scan which also showed pneumonia and pulmonary HTN. hospital course C/B New Afib,  on lopressor 75mg po q8 hourly and lopressor 5mg ivp PRN, echo w/ preserved EF, - c/w heparin drip for [id number]   pt remains afebrile and resp status improving, weaned off from BIPAP to now on NC , BIPAP HS    CT chest reads worsening middle lobe PNA, WBC likely reactive to steroids, S/P Abx, - repeat blood clx NGTD  Patient now presents to [facility] for Right nad left heart cath to assess her right heart pressures and to evaluate Coronary anatomy with possible intervention in setting of CP, HFPef with SOB    Patient now s/p Right and  left heart catheterization via  RBV and RRA  approach (RRB and RBV # 6 F sheath in place) with Dr. Dang,    Intraprocedurally:   Patient received IV Heparin 5,000 units and IV sedation     Findings:     Prelim cath findings as below:    S/P Diagnostic   LHC    Mild LAD disease    S/P RHC  Right heart hemodynamics s below:  PA: 67/25/40  RV: 67/2/13  RA: 16/16/13  LV: 123/9/16  PA SAT: 53.8%  AO SAT: 86.8^  CO: 5.27  CI: 2.0      Official cath report pending    # s/p LHC /mILD LAD disease/ Severe pulmonary HTN/ LVEDP stable      -ADMIT due to: / Pt is already in-patient secondary to AHRF   -post cardiac cath orders  -REMOVE RRB AND RBV IN 30 Minutes  to 1 hour POST PROCEDURE  -Right radial and brachial  precautions  -bedrest x 1 hours post procedure post band removal  -NS 0.9% 250ml/hr x 1 bolus: post procedure KIERRA ppx , received in procedure room , will not order more fluids in setting of HF   -continue current medical therapy INCLUDING  Discontinue Heparin gtt  START Xarelto 15mg po daily in 6 hours after removal of RRB AND RBV SHEATH if RUE benign  -C/W Statin and other current medication regimebn, further cardiac medication regimen management per cardiology  -follow up outpt in 2 weeks with Cardiologist DR Madsen  -[facility] following during hospitalization  -Lifestyle modifications discussed to reduce cardiovascular risk factors including weight reduction, smoking cessation, medication compliance, and routine follow up with Cardiologist to track your BMI, cholesterol, and glucose levels.   - Patient is not a candidate for cardiac rehab secondary to diagnostic cath  Further -Management per Hospitalist / cardiology  -transfer patient to tele unit on monitor once criteria met  -Discussed with DR Dang, DR Madsen, Hospitalist     67 y/o female with hx of HFpEF (EF 55-60% 8/2023), on home O2 4L, pulmonary HTN, morbid obesity, asthma, CKD3, IDDM2, uterine cancer s/p MEGAN, DVT LLE (2004), chronic leg edema 2/2 venous stasis, spinal stenosis with chronic back pain who presents with midsternal chest pressure and shortness of breath. Patient has had multiple hospitalizations since September 2023. She had hospitalization 8/30-9/13/23 for acute on chronic decompensated HFpEF with possible presumed PE and severe pulmonary hypertension (PASP of 70 mmHg). During that admission, she was profoundly volume overloaded requiring IV diuresis; elevated DDimer 407; US lower extremity edema: Markedly limited visualization. Unable to perform compression. No evidence of deep venous thrombosis in either lower extremity. +trop .09-> .08-> .06. TTE during that time: LVEF 55-60%; moderate RVE and moderately reduced RVSF; PASP 70.9mmHg. Due to patient's weight and renal function, ischemic testing cath/ NST was precluded. Patient was ultimately discharged home on oral diuresis and eliquis 5mg PO BID.   Patient had admission to [facility] 9/18-9/29/23 for complaints of rectal pain; constipation and no reported BM x 2 weeks. CT scan consistent for  constipation Rectal pain may also be d/t anal fissure vs thrombosed hemorrhoids, started on bowel regimen and nitroglycerin  ointment rectally BID for possible fissure vs thrombosed hemorrhoids. Colorectal surgery consulted & rec outpatient follow up. During her stay, she had an acute UTI treated with Ceftriaxone x 3 days. Patient was recommended to being discharged to [facility] but refused was ultimately discharged home on 9/29/23.   Patient had readmission back to [facility] on 9/29/23 for b/l LE weakness, legs giving out while walking and almost falling. She was admitted for [facility] placement and ultimately discharged on 10/4/23.   he presented to [facility] 11/24/23 for severe rectal pain with defecation evaluated by colorectal surgery in which exam was consistent with anal fissure. CT A/P w/o acute pathology; no acute surgical intervention; SITZ bath TID, Miralax/Senna, Lidocaine Cr post BM, Nitroglycerin ointment BID. Pt also with new wound anterior right ankle area with no signs of infection and no signs of cellulitis.  Seen by podiatry, Rx betadine with gauze and yasir right ankle. Patient was ultimately discharged home on 12/1.   On  12/16/23 patient presents from [facility]. She states she had multiple episodes of non radiating mid chest pressure and shortness of breath at rest.    Patient reports that her dyspnea just worsened and she told the nursing home to send her in for an evaluation. While in the ED, patient was placed on BIPAP and was given IV lasix with some improvement. RVP then results as RSV +. Patient had a CT scan which also showed pneumonia and pulmonary HTN. hospital course C/B New Afib,  on lopressor 75mg po q8 hourly and lopressor 5mg ivp PRN, echo w/ preserved EF, - c/w heparin drip for [id number] 6   pt remains afebrile and resp status improving, weaned off from BIPAP to now on NC , BIPAP HS    CT chest reads worsening middle lobe PNA, WBC likely reactive to steroids, S/P Abx, - repeat blood clx NGTD  Patient now presents to [facility] for Right nad left heart cath to assess her right heart pressures and to evaluate Coronary anatomy with possible intervention in setting of CP, HFPef with SOB    Patient now s/p Right and  left heart catheterization via  RBV and RRA  approach (RRB and RBV # 6 F sheath in place) with Dr. Dang,    Intraprocedurally:   Patient received IV Heparin 5,000 units and IV sedation     Findings:     Prelim cath findings as below:    S/P Diagnostic   LHC    Mild LAD disease    S/P RHC  Right heart hemodynamics s below:  PA: 67/25/40  RV: 67/2/13  RA: 16/16/13  LV: 123/9/16  PA SAT: 53.8%  AO SAT: 86.8^  CO: 5.27  CI: 2.0      Official cath report pending    # s/p LHC /mILD LAD disease/ Severe pulmonary HTN/ LVEDP stable      -ADMIT due to: / Pt is already in-patient secondary to AHRF   -post cardiac cath orders  -REMOVE RRB AND RBV IN 30 Minutes  to 1 hour POST PROCEDURE  -Right radial and brachial  precautions  -bedrest x 1 hours post procedure post band removal  -NS 0.9% 250ml/hr x 1 bolus: post procedure KIERRA ppx , received in procedure room , will not order more fluids in setting of HF   -continue current medical therapy INCLUDING  Discontinue Heparin gtt  START Xarelto 15mg po daily in 6 hours after removal of RRB AND RBV SHEATH if RUE benign  -C/W Statin and other current medication regimebn, further cardiac medication regimen management per cardiology  C/W BB iv prn and po fora fib sarah control  -follow up outpt in 2 weeks with Cardiologist DR Madsen  -[facility] following during hospitalization  -Lifestyle modifications discussed to reduce cardiovascular risk factors including weight reduction, smoking cessation, medication compliance, and routine follow up with Cardiologist to track your BMI, cholesterol, and glucose levels.   - Patient is not a candidate for cardiac rehab secondary to diagnostic cath  Further -Management per Hospitalist / cardiology  -transfer patient to tele unit on monitor once criteria met  -Discussed with DR Dang, DR Madsen, Hospitalist     69 y/o female with hx of HFpEF (EF 55-60% 8/2023), on home O2 4L, pulmonary HTN, morbid obesity, asthma, CKD3, IDDM2, uterine cancer s/p MEGAN, DVT LLE (2004), chronic leg edema 2/2 venous stasis, spinal stenosis with chronic back pain who presents with midsternal chest pressure and shortness of breath. Patient has had multiple hospitalizations since September 2023. She had hospitalization 8/30-9/13/23 for acute on chronic decompensated HFpEF with possible presumed PE and severe pulmonary hypertension (PASP of 70 mmHg). During that admission, she was profoundly volume overloaded requiring IV diuresis; elevated DDimer 407; US lower extremity edema: Markedly limited visualization. Unable to perform compression. No evidence of deep venous thrombosis in either lower extremity. +trop .09-> .08-> .06. TTE during that time: LVEF 55-60%; moderate RVE and moderately reduced RVSF; PASP 70.9mmHg. Due to patient's weight and renal function, ischemic testing cath/ NST was precluded. Patient was ultimately discharged home on oral diuresis and eliquis 5mg PO BID.   Patient had admission to [facility] 9/18-9/29/23 for complaints of rectal pain; constipation and no reported BM x 2 weeks. CT scan consistent for  constipation Rectal pain may also be d/t anal fissure vs thrombosed hemorrhoids, started on bowel regimen and nitroglycerin  ointment rectally BID for possible fissure vs thrombosed hemorrhoids. Colorectal surgery consulted & rec outpatient follow up. During her stay, she had an acute UTI treated with Ceftriaxone x 3 days. Patient was recommended to being discharged to [facility] but refused was ultimately discharged home on 9/29/23.   Patient had readmission back to [facility] on 9/29/23 for b/l LE weakness, legs giving out while walking and almost falling. She was admitted for [facility] placement and ultimately discharged on 10/4/23.   he presented to [facility] 11/24/23 for severe rectal pain with defecation evaluated by colorectal surgery in which exam was consistent with anal fissure. CT A/P w/o acute pathology; no acute surgical intervention; SITZ bath TID, Miralax/Senna, Lidocaine Cr post BM, Nitroglycerin ointment BID. Pt also with new wound anterior right ankle area with no signs of infection and no signs of cellulitis.  Seen by podiatry, Rx betadine with gauze and yasir right ankle. Patient was ultimately discharged home on 12/1.   On  12/16/23 patient presents from [facility]. She states she had multiple episodes of non radiating mid chest pressure and shortness of breath at rest.    Patient reports that her dyspnea just worsened and she told the nursing home to send her in for an evaluation. While in the ED, patient was placed on BIPAP and was given IV lasix with some improvement. RVP then results as RSV +. Patient had a CT scan which also showed pneumonia and pulmonary HTN. hospital course C/B New Afib,  on lopressor 75mg po q8 hourly and lopressor 5mg ivp PRN, echo w/ preserved EF, - c/w heparin drip for [id number]   pt remains afebrile and resp status improving, weaned off from BIPAP to now on NC , BIPAP HS    CT chest reads worsening middle lobe PNA, WBC likely reactive to steroids, S/P Abx, - repeat blood clx NGTD  Patient now presents to [facility] for Right nad left heart cath to assess her right heart pressures and to evaluate Coronary anatomy with possible intervention in setting of CP, HFPef with SOB    Patient now s/p Right and  left heart catheterization via  RBV and RRA  approach (RRB and RBV # 6 F sheath in place) with Dr. Dang,    Intraprocedurally:   Patient received IV Heparin 5,000 units and IV sedation     Findings:     Prelim cath findings as below:    S/P Diagnostic   LHC    Mild LAD disease    S/P RHC  Right heart hemodynamics s below:  PA: 67/25/40  RV: 67/2/13  RA: 16/16/13  LV: 123/9/16  PA SAT: 53.8%  AO SAT: 86.8^  CO: 5.27  CI: 2.0      Official cath report pending    # s/p LHC /mILD LAD disease/ Severe pulmonary HTN/ LVEDP stable      -ADMIT due to: / Pt is already in-patient secondary to AHRF   -post cardiac cath orders  -REMOVE RRB AND RBV IN 30 Minutes  to 1 hour POST PROCEDURE  -Right radial and brachial  precautions  -bedrest x 1 hours post procedure post band removal  -NS 0.9% 250ml/hr x 1 bolus: post procedure KIERRA ppx , received in procedure room , will not order more fluids in setting of HF   -continue current medical therapy INCLUDING  Discontinue Heparin gtt  START Xarelto 15mg po daily in 6 hours after removal of RRB AND RBV SHEATH if RUE benign  -C/W Statin and other current medication regimebn, further cardiac medication regimen management per cardiology  C/W BB iv prn and po fora fib sarah control  -follow up outpt in 2 weeks with Cardiologist DR Madsen  -[facility] following during hospitalization  -Lifestyle modifications discussed to reduce cardiovascular risk factors including weight reduction, smoking cessation, medication compliance, and routine follow up with Cardiologist to track your BMI, cholesterol, and glucose levels.   - Patient is not a candidate for cardiac rehab secondary to diagnostic cath  Further -Management per Hospitalist / cardiology  -transfer patient to tele unit on monitor once criteria met  -Discussed with DR Dang, DR Madsen, Hospitalist

## 2023-12-29 NOTE — PROGRESS NOTE ADULT - SUBJECTIVE AND OBJECTIVE BOX
Department of Cardiology                                                                  [facility]/[place] [address] [address] [facility]                                                            Telephone: [phone]. Fax:[phone]                                                    Post- Procedure Note: Left Heart Cardiac Catheterization       Narrative:   patient now s/p Right and  left heart catheterization via  RBV and RRA  approach (RRB and RBV # 6 F sheath in place) with Dr. Dang,  tolerated procedure well without complications. Arrived to recovery room NAD and hemodynamically stable, distal pulse +, neurovascular intact          PAST MEDICAL & SURGICAL HISTORY:  Diabetes Mellitus Type II      HTN (Hypertension)      Endometrial Hyperplasia      Cervical Stenosis of Spine      Spinal Stenosis, Lumbar      Deep Vein Thrombosis (DVT)  Left leg, 2004, treated and resolved      Dyslipidemia      Cataract      Morbid Obesity      Vitamin D deficiency      Insomnia      CKD (chronic kidney disease)  ~ III      Congestive heart failure  ~ HFpEF      Uterine cancer      Asthma      On home oxygen therapy      Gait difficulty  ~ u ses walker      Cataract extracted with lens implant 1998  Right      C Section 1994      Cholecystectomy/appendectomy @ age 25      D&C x2 1980's      D&C 2008  hysteroscopy, endometrial hyperplasia, 2009      Cervical Spinal Stenosis surgery x2 (01/2002, 7/2002)      Tonsillectomy as a child      Endometrial biopsy 12/02/09      H/O laser iridotomy  left eye, 2016      H/O colonoscopy  1998      S/P total abdominal hysterectomy and bilateral salpingo-oophorectomy      S/P appendectomy      S/P cholecystectomy        Home Medications:  ADVAIR -21 MCG INHALER: TAKE 2 PUFFS BY MOUTH TWICE A DAY (16 Dec 2023 15:21)  apixaban 5 mg oral tablet: 1 tab(s) orally every 12 hours (16 Dec 2023 15:21)  diphenhydrAMINE 25 mg oral tablet: 1 tab(s) orally 3 times a day as needed for  rash (16 Dec 2023 15:35)  furosemide 40 mg oral tablet: 1 tab(s) orally once a day (16 Dec 2023 15:21)  HumaLOG 100 units/mL injectable solution: 20 international unit(s) injectable twice a day after breakfast and dinner (16 Dec 2023 15:19)  ipratropium-albuterol 0.5 mg-2.5 mg/3 mL inhalation solution: 3 milliliter(s) by nebulizer 4 times a day (16 Dec 2023 15:35)  lactulose 20 g oral powder for reconstitution: 1 each orally once a day (16 Dec 2023 15:35)  Lantus 100 units/mL subcutaneous solution: 35 unit(s) subcutaneous once a day (at bedtime) (16 Dec 2023 15:36)  MiraLax oral powder for reconstitution: 17 gram(s) orally once a day (16 Dec 2023 15:21)  Nupercainal 1% topical ointment: Apply topically to affected area 3 times a day (16 Dec 2023 15:34)  nystatin 100,000 units/g topical powder: 1 Apply topically to affected area 3 times a day (16 Dec 2023 15:21)  oxyCODONE 5 mg oral tablet: 1 tab(s) orally every 6 hours As needed Moderate Pain (4 - 6) (16 Dec 2023 15:36)  oxyCODONE 60 mg oral tablet, extended release: 1 tab(s) orally 2 times a day (16 Dec 2023 15:21)  ProAir HFA 90 mcg/inh inhalation aerosol: 2 puff(s) inhaled (16 Dec 2023 15:36)  rosuvastatin 20 mg oral capsule: 1 cap(s) orally once a day (at bedtime) (16 Dec 2023 15:21)  senna leaf extract oral tablet: 2 tab(s) orally once a day (at bedtime) (16 Dec 2023 15:21)  Xanax 0.25 mg oral tablet: 1 tab(s) orally once a day (at bedtime) as needed for  anxiety (16 Dec 2023 15:35)        wool- rash, itch (Other)  adhesives (Rash)  latex (Rash)  Bactrim (Flushing)      Objective:  Vital Signs Last 24 Hrs  T(C): 36.6 (29 Dec 2023 14:39), Max: 36.9 (28 Dec 2023 20:59)  T(F): 97.9 (29 Dec 2023 14:39), Max: 98.4 (28 Dec 2023 20:59)  HR: 142 (29 Dec 2023 14:39) (98 - 142)  BP: 114/73 (29 Dec 2023 14:39) (105/69 - 151/92)  BP(mean): --  RR: 20 (29 Dec 2023 14:39) (18 - 20)  SpO2: 95% (29 Dec 2023 14:39) (94% - 97%)    Parameters below as of 29 Dec 2023 14:39  Patient On (Oxygen Delivery Method): nasal cannula  O2 Flow (L/min): 4      GENERAL: Pt lying comfortably, NAD.  ENMT: PERRL, +EOMI.  NECK: soft, Supple, No JVD,   CHEST/LUNG: Clear to auscultatation bilaterally; No wheezing.  HEART: S1S2+, Regular rate and rhythm; No murmurs.  ABDOMEN: Soft, Nontender, Nondistended; Bowel sounds present.  MUSCULOSKELETAL: Normal range of motion.  SKIN: No rashes or lesions.  NEURO: AAOX3, no focal deficits, no motor r sensory loss.  PSYCH: normal mood.  Procedure site: RBV, RRA , no signs of bleeding or hematoma, neurovascular intact   VASCULAR:   Radial +2 R/+2 L  Femoral +2 R/+2 L  PT +2 R/+2 L  DP +2 R/+2 L                          13.4   27.52 )-----------( 227      ( 29 Dec 2023 05:52 )             41.3     12-29    136  |  94<L>  |  69.4<H>  ----------------------------<  218<H>  5.0   |  30.0<H>  |  1.60<H>    Ca    9.3      29 Dec 2023 05:52      PTT - ( 29 Dec 2023 05:52 )  PTT:61.6 sec                                                                                Department of Cardiology                                                                  [facility]/[place] [address] [address] [facility]                                                            Telephone: [phone]. Fax:[phone]                                                    Post- Procedure Note: Left Heart Cardiac Catheterization       Narrative:   patient now s/p Right and  left heart catheterization via  RBV and RRA  approach (RRB and RBV # 6 F sheath in place) with Dr. Dang,  tolerated procedure well without complications. Arrived to recovery room NAD and hemodynamically stable, distal pulse +, neurovascular intact          PAST MEDICAL & SURGICAL HISTORY:  Diabetes Mellitus Type II      HTN (Hypertension)      Endometrial Hyperplasia      Cervical Stenosis of Spine      Spinal Stenosis, Lumbar      Deep Vein Thrombosis (DVT)  Left leg, 2004, treated and resolved      Dyslipidemia      Cataract      Morbid Obesity      Vitamin D deficiency      Insomnia      CKD (chronic kidney disease)  ~ III      Congestive heart failure  ~ HFpEF      Uterine cancer      Asthma      On home oxygen therapy      Gait difficulty  ~ u ses walker      Cataract extracted with lens implant 1998  Right      C Section 1994      Cholecystectomy/appendectomy @ age 25      D&C x2 1980's      D&C 2008  hysteroscopy, endometrial hyperplasia, 2009      Cervical Spinal Stenosis surgery x2 (01/2002, 7/2002)      Tonsillectomy as a child      Endometrial biopsy 12/02/09      H/O laser iridotomy  left eye, 2016      H/O colonoscopy  1998      S/P total abdominal hysterectomy and bilateral salpingo-oophorectomy      S/P appendectomy      S/P cholecystectomy        Home Medications:  ADVAIR -21 MCG INHALER: TAKE 2 PUFFS BY MOUTH TWICE A DAY (16 Dec 2023 15:21)  apixaban 5 mg oral tablet: 1 tab(s) orally every 12 hours (16 Dec 2023 15:21)  diphenhydrAMINE 25 mg oral tablet: 1 tab(s) orally 3 times a day as needed for  rash (16 Dec 2023 15:35)  furosemide 40 mg oral tablet: 1 tab(s) orally once a day (16 Dec 2023 15:21)  HumaLOG 100 units/mL injectable solution: 20 international unit(s) injectable twice a day after breakfast and dinner (16 Dec 2023 15:19)  ipratropium-albuterol 0.5 mg-2.5 mg/3 mL inhalation solution: 3 milliliter(s) by nebulizer 4 times a day (16 Dec 2023 15:35)  lactulose 20 g oral powder for reconstitution: 1 each orally once a day (16 Dec 2023 15:35)  Lantus 100 units/mL subcutaneous solution: 35 unit(s) subcutaneous once a day (at bedtime) (16 Dec 2023 15:36)  MiraLax oral powder for reconstitution: 17 gram(s) orally once a day (16 Dec 2023 15:21)  Nupercainal 1% topical ointment: Apply topically to affected area 3 times a day (16 Dec 2023 15:34)  nystatin 100,000 units/g topical powder: 1 Apply topically to affected area 3 times a day (16 Dec 2023 15:21)  oxyCODONE 5 mg oral tablet: 1 tab(s) orally every 6 hours As needed Moderate Pain (4 - 6) (16 Dec 2023 15:36)  oxyCODONE 60 mg oral tablet, extended release: 1 tab(s) orally 2 times a day (16 Dec 2023 15:21)  ProAir HFA 90 mcg/inh inhalation aerosol: 2 puff(s) inhaled (16 Dec 2023 15:36)  rosuvastatin 20 mg oral capsule: 1 cap(s) orally once a day (at bedtime) (16 Dec 2023 15:21)  senna leaf extract oral tablet: 2 tab(s) orally once a day (at bedtime) (16 Dec 2023 15:21)  Xanax 0.25 mg oral tablet: 1 tab(s) orally once a day (at bedtime) as needed for  anxiety (16 Dec 2023 15:35)        wool- rash, itch (Other)  adhesives (Rash)  latex (Rash)  Bactrim (Flushing)      Objective:  Vital Signs Last 24 Hrs  T(C): 36.6 (29 Dec 2023 14:39), Max: 36.9 (28 Dec 2023 20:59)  T(F): 97.9 (29 Dec 2023 14:39), Max: 98.4 (28 Dec 2023 20:59)  HR: 142 (29 Dec 2023 14:39) (98 - 142)  BP: 114/73 (29 Dec 2023 14:39) (105/69 - 151/92)  BP(mean): --  RR: 20 (29 Dec 2023 14:39) (18 - 20)  SpO2: 95% (29 Dec 2023 14:39) (94% - 97%)    Parameters below as of 29 Dec 2023 14:39  Patient On (Oxygen Delivery Method): nasal cannula  O2 Flow (L/min): 4      GENERAL: Pt lying comfortably, NAD.  ENMT: PERRL, +EOMI.  NECK: soft, Supple, No JVD,   CHEST/LUNG: Clear to auscultatation bilaterally; No wheezing.  HEART: S1S2+, Regular rate and rhythm; No murmurs.  ABDOMEN: Soft, Nontender, Nondistended; Bowel sounds present.  MUSCULOSKELETAL: Normal range of motion.  SKIN: No rashes or lesions.  NEURO: AAOX3, no focal deficits, no motor r sensory loss.  PSYCH: normal mood.  Procedure site: RBV, RRA , no signs of bleeding or hematoma, neurovascular intact   VASCULAR:   Radial +2 R/+2 L  Femoral +2 R/+2 L  PT +2 R/+2 L  DP +2 R/+2 L                          13.4   27.52 )-----------( 227      ( 29 Dec 2023 05:52 )             41.3     12-29    136  |  94<L>  |  69.4<H>  ----------------------------<  218<H>  5.0   |  30.0<H>  |  1.60<H>    Ca    9.3      29 Dec 2023 05:52      PTT - ( 29 Dec 2023 05:52 )  PTT:61.6 sec

## 2023-12-29 NOTE — PROGRESS NOTE ADULT - SUBJECTIVE AND OBJECTIVE BOX
[facility]                                                         CARDIOLOGY AT [facility]                                                                  [address] [facility], [place] [place] [address]                                                         Telephone: [phone]. Fax:[phone]                                                                             PROGRESS NOTE    Reason for follow up: HFrEF, PHT  Update:  on 3L NC now  better respiratory status  improvement in renal function      Review of symptoms:   Cardiac:  No chest pain. No dyspnea. No palpitations.  Respiratory: no cough. No dyspnea  Gastrointestinal: No diarrhea. No abdominal pain. No bleeding.   Neuro: No focal neuro complaints.    Vitals:  T(C): 36.7 (12-28-23 @ 12:58), Max: 37 (12-28-23 @ 05:11)  HR: 113 (12-28-23 @ 12:58) (100 - 124)  BP: 113/66 (12-28-23 @ 12:58) (103/67 - 136/71)  RR: 18 (12-28-23 @ 12:58) (17 - 20)  SpO2: 99% (12-28-23 @ 12:58) (95% - 100%)  Wt(kg): --  I&O's Summary    27 Dec 2023 07:01  -  28 Dec 2023 07:00  --------------------------------------------------------  IN: 1360 mL / OUT: 2750 mL / NET: -1390 mL          PHYSICAL EXAM:  Appearance: Comfortable. No acute distress  HEENT:  Atraumatic. Normocephalic.  Normal oral mucosa  Neurologic: A & O x 3, no gross focal deficits.  Cardiovascular: Irregularly irregular. No murmur, no rubs/gallops. No JVD  Respiratory: B/L lungs are coarse, unlabored, NC in place 3L  Gastrointestinal:  Soft, Non-tender, + BS  Lower Extremities: 2+ Peripheral Pulses, No clubbing, cyanosis. B/L LE +1 edema  Psychiatry: Patient is calm. No agitation.   Skin: warm and dry.    CURRENT CARDIAC MEDICATIONS:  hydrALAZINE Injectable 10 milliGRAM(s) IV Push every 4 hours PRN  metoprolol tartrate 75 milliGRAM(s) Oral every 8 hours  metoprolol tartrate Injectable 5 milliGRAM(s) IV Push every 6 hours PRN      CURRENT OTHER MEDICATIONS:  acetylcysteine 20%  Inhalation 4 milliLiter(s) Inhalation daily  albuterol    90 MICROgram(s) HFA Inhaler 2 Puff(s) Inhalation every 2 hours PRN Shortness of Breath and/or Wheezing  budesonide  80 MICROgram(s)/formoterol 4.5 MICROgram(s) Inhaler 2 Puff(s) Inhalation two times a day  ipratropium    for Nebulization 500 MICROGram(s) Nebulizer every 6 hours  levalbuterol Inhalation 0.63 milliGRAM(s) Inhalation every 6 hours  acetaminophen     Tablet .. 650 milliGRAM(s) Oral every 6 hours PRN Temp greater or equal to 38C (100.4F), Mild Pain (1 - 3)  diphenhydrAMINE 25 milliGRAM(s) Oral every 6 hours PRN Rash and/or Itching  HYDROmorphone  Injectable 1 milliGRAM(s) IV Push every 4 hours PRN breakthrough  pain  melatonin 3 milliGRAM(s) Oral at bedtime PRN Insomnia  ondansetron Injectable 4 milliGRAM(s) IV Push every 8 hours PRN Nausea and/or Vomiting  oxyCODONE    IR 5 milliGRAM(s) Oral every 6 hours PRN Moderate Pain (4 - 6)  oxyCODONE  ER Tablet 60 milliGRAM(s) Oral every 12 hours  famotidine    Tablet 20 milliGRAM(s) Oral daily  lactulose Syrup 20 Gram(s) Oral daily  polyethylene glycol 3350 17 Gram(s) Oral daily  senna 2 Tablet(s) Oral at bedtime  atorvastatin 80 milliGRAM(s) Oral at bedtime  dextrose 50% Injectable 25 Gram(s) IV Push once, Stop order after: 1 Doses  dextrose 50% Injectable 12.5 Gram(s) IV Push once, Stop order after: 1 Doses  dextrose 50% Injectable 25 Gram(s) IV Push once, Stop order after: 1 Doses  dextrose Oral Gel 15 Gram(s) Oral once, Stop order after: 1 Doses PRN Blood Glucose LESS THAN 70 milliGRAM(s)/deciliter  glucagon  Injectable 1 milliGRAM(s) IntraMuscular once, Stop order after: 1 Doses  insulin glargine Injectable (LANTUS) 50 Unit(s) SubCutaneous at bedtime  insulin glargine Injectable (LANTUS) 50 Unit(s) SubCutaneous every morning  insulin lispro (ADMELOG) corrective regimen sliding scale   SubCutaneous three times a day before meals  insulin lispro Injectable (ADMELOG) 40 Unit(s) SubCutaneous three times a day before meals  chlorhexidine 2% Cloths 1 Application(s) Topical <User Schedule>  clotrimazole 1% Cream 1 Application(s) Topical two times a day  dextrose 5%. 1000 milliLiter(s) (50 mL/Hr) IV Continuous <Continuous>  dextrose 5%. 1000 milliLiter(s) (100 mL/Hr) IV Continuous <Continuous>  heparin   Injectable 22079 Unit(s) IV Push every 6 hours PRN For aPTT less than 40  heparin   Injectable 5000 Unit(s) IV Push every 6 hours PRN For aPTT between 40 - 57  heparin  Infusion.  Unit(s)/Hr (24 mL/Hr) IV Continuous <Continuous>  nystatin Powder 1 Application(s) Topical two times a day      LABS:	 	                            13.5   31.44 )-----------( 259      ( 28 Dec 2023 04:37 )             41.7     12-28    133<L>  |  92<L>  |  71.5<H>  ----------------------------<  199<H>  4.9   |  31.0<H>  |  1.57<H>    Ca    9.2      28 Dec 2023 12:38  Mg     2.6     12-27      PT/INR/PTT ( 28 Dec 2023 04:37 )                       :                       :      X            :       58.8                  .        .                   .              .           .       X           .                                       Lipid Profile: Date: 12-17 @ 08:20  Total cholesterol 140; Direct LDL: --; HDL: 70; Triglycerides:133    HgA1c:   TSH: Thyroid Stimulating Hormone, Serum: 0.34 uIU/mL      TELEMETRY:   ECG:    DIAGNOSTIC TESTING:  [ ] Echocardiogram:  Echocardiogram: < from: TTE W or WO Ultrasound Enhancing Agent (12.18.23 @ 13:36) >      1. Technically difficult image quality.   2. Normal biventricular systolic function.   3. Compared to the transthoracic echocardiogram performed on 8/31/2023.    < end of copied text >  [ ]  Catheterization:  [ ] Stress Test:    OTHER: 	                                                                [facility]                                                         CARDIOLOGY AT [facility]                                                                  [address] [facility], [place] [place] [address]                                                         Telephone: [phone]. Fax:[phone]                                                                             PROGRESS NOTE    Reason for follow up: HFrEF, PHT  Update:  on 3L NC now  better respiratory status  improvement in renal function      Review of symptoms:   Cardiac:  No chest pain. No dyspnea. No palpitations.  Respiratory: no cough. No dyspnea  Gastrointestinal: No diarrhea. No abdominal pain. No bleeding.   Neuro: No focal neuro complaints.    Vitals:  T(C): 36.7 (12-28-23 @ 12:58), Max: 37 (12-28-23 @ 05:11)  HR: 113 (12-28-23 @ 12:58) (100 - 124)  BP: 113/66 (12-28-23 @ 12:58) (103/67 - 136/71)  RR: 18 (12-28-23 @ 12:58) (17 - 20)  SpO2: 99% (12-28-23 @ 12:58) (95% - 100%)  Wt(kg): --  I&O's Summary    27 Dec 2023 07:01  -  28 Dec 2023 07:00  --------------------------------------------------------  IN: 1360 mL / OUT: 2750 mL / NET: -1390 mL          PHYSICAL EXAM:  Appearance: Comfortable. No acute distress  HEENT:  Atraumatic. Normocephalic.  Normal oral mucosa  Neurologic: A & O x 3, no gross focal deficits.  Cardiovascular: Irregularly irregular. No murmur, no rubs/gallops. No JVD  Respiratory: B/L lungs are coarse, unlabored, NC in place 3L  Gastrointestinal:  Soft, Non-tender, + BS  Lower Extremities: 2+ Peripheral Pulses, No clubbing, cyanosis. B/L LE +1 edema  Psychiatry: Patient is calm. No agitation.   Skin: warm and dry.    CURRENT CARDIAC MEDICATIONS:  hydrALAZINE Injectable 10 milliGRAM(s) IV Push every 4 hours PRN  metoprolol tartrate 75 milliGRAM(s) Oral every 8 hours  metoprolol tartrate Injectable 5 milliGRAM(s) IV Push every 6 hours PRN      CURRENT OTHER MEDICATIONS:  acetylcysteine 20%  Inhalation 4 milliLiter(s) Inhalation daily  albuterol    90 MICROgram(s) HFA Inhaler 2 Puff(s) Inhalation every 2 hours PRN Shortness of Breath and/or Wheezing  budesonide  80 MICROgram(s)/formoterol 4.5 MICROgram(s) Inhaler 2 Puff(s) Inhalation two times a day  ipratropium    for Nebulization 500 MICROGram(s) Nebulizer every 6 hours  levalbuterol Inhalation 0.63 milliGRAM(s) Inhalation every 6 hours  acetaminophen     Tablet .. 650 milliGRAM(s) Oral every 6 hours PRN Temp greater or equal to 38C (100.4F), Mild Pain (1 - 3)  diphenhydrAMINE 25 milliGRAM(s) Oral every 6 hours PRN Rash and/or Itching  HYDROmorphone  Injectable 1 milliGRAM(s) IV Push every 4 hours PRN breakthrough  pain  melatonin 3 milliGRAM(s) Oral at bedtime PRN Insomnia  ondansetron Injectable 4 milliGRAM(s) IV Push every 8 hours PRN Nausea and/or Vomiting  oxyCODONE    IR 5 milliGRAM(s) Oral every 6 hours PRN Moderate Pain (4 - 6)  oxyCODONE  ER Tablet 60 milliGRAM(s) Oral every 12 hours  famotidine    Tablet 20 milliGRAM(s) Oral daily  lactulose Syrup 20 Gram(s) Oral daily  polyethylene glycol 3350 17 Gram(s) Oral daily  senna 2 Tablet(s) Oral at bedtime  atorvastatin 80 milliGRAM(s) Oral at bedtime  dextrose 50% Injectable 25 Gram(s) IV Push once, Stop order after: 1 Doses  dextrose 50% Injectable 12.5 Gram(s) IV Push once, Stop order after: 1 Doses  dextrose 50% Injectable 25 Gram(s) IV Push once, Stop order after: 1 Doses  dextrose Oral Gel 15 Gram(s) Oral once, Stop order after: 1 Doses PRN Blood Glucose LESS THAN 70 milliGRAM(s)/deciliter  glucagon  Injectable 1 milliGRAM(s) IntraMuscular once, Stop order after: 1 Doses  insulin glargine Injectable (LANTUS) 50 Unit(s) SubCutaneous at bedtime  insulin glargine Injectable (LANTUS) 50 Unit(s) SubCutaneous every morning  insulin lispro (ADMELOG) corrective regimen sliding scale   SubCutaneous three times a day before meals  insulin lispro Injectable (ADMELOG) 40 Unit(s) SubCutaneous three times a day before meals  chlorhexidine 2% Cloths 1 Application(s) Topical <User Schedule>  clotrimazole 1% Cream 1 Application(s) Topical two times a day  dextrose 5%. 1000 milliLiter(s) (50 mL/Hr) IV Continuous <Continuous>  dextrose 5%. 1000 milliLiter(s) (100 mL/Hr) IV Continuous <Continuous>  heparin   Injectable 14425 Unit(s) IV Push every 6 hours PRN For aPTT less than 40  heparin   Injectable 5000 Unit(s) IV Push every 6 hours PRN For aPTT between 40 - 57  heparin  Infusion.  Unit(s)/Hr (24 mL/Hr) IV Continuous <Continuous>  nystatin Powder 1 Application(s) Topical two times a day      LABS:	 	                            13.5   31.44 )-----------( 259      ( 28 Dec 2023 04:37 )             41.7     12-28    133<L>  |  92<L>  |  71.5<H>  ----------------------------<  199<H>  4.9   |  31.0<H>  |  1.57<H>    Ca    9.2      28 Dec 2023 12:38  Mg     2.6     12-27      PT/INR/PTT ( 28 Dec 2023 04:37 )                       :                       :      X            :       58.8                  .        .                   .              .           .       X           .                                       Lipid Profile: Date: 12-17 @ 08:20  Total cholesterol 140; Direct LDL: --; HDL: 70; Triglycerides:133    HgA1c:   TSH: Thyroid Stimulating Hormone, Serum: 0.34 uIU/mL      TELEMETRY:   ECG:    DIAGNOSTIC TESTING:  [ ] Echocardiogram:  Echocardiogram: < from: TTE W or WO Ultrasound Enhancing Agent (12.18.23 @ 13:36) >      1. Technically difficult image quality.   2. Normal biventricular systolic function.   3. Compared to the transthoracic echocardiogram performed on 8/31/2023.    < end of copied text >  [ ]  Catheterization:  [ ] Stress Test:    OTHER:

## 2023-12-29 NOTE — DISCHARGE NOTE PROVIDER - ATTENDING DISCHARGE PHYSICAL EXAMINATION:
Vital Signs Last 24 Hrs  T(C): 36.7 (08 Jan 2024 08:10), Max: 36.9 (07 Jan 2024 21:09)  T(F): 98 (08 Jan 2024 08:10), Max: 98.4 (07 Jan 2024 21:09)  HR: 90 (08 Jan 2024 08:31) (67 - 98)  BP: 104/58 (08 Jan 2024 08:10) (92/60 - 123/61)  BP(mean): 63 (07 Jan 2024 16:28) (63 - 63)  RR: 18 (08 Jan 2024 08:10) (18 - 20)  SpO2: 98% (08 Jan 2024 08:31) (94% - 100%)    Parameters below as of 08 Jan 2024 08:31  Patient On (Oxygen Delivery Method): nasal cannula, 4L nc    GENERAL: NAD  CHEST/LUNG: decreased sounds b/l; respirations unlabored on 4LNC   HEART: S1S2+, Regular rate and rhythm  ABDOMEN: Soft, Nontender, Nondistended; Bowel sounds present  SKIN: warm, dry   NEURO: Awake, alert;   PSYCH: calm, cooperative

## 2023-12-29 NOTE — PROGRESS NOTE ADULT - NUTRITIONAL ASSESSMENT
This patient has been assessed with a concern for Malnutrition and has been determined to have a diagnosis/diagnoses of Severe protein-calorie malnutrition and Morbid obesity (BMI > 40).    This patient is being managed with:   Diet NPO-  NPO for Procedure/Test     NPO Start Date: 29-Dec-2023   NPO Start Time: 12:00  Entered: Dec 29 2023 12:12PM    Diet Consistent Carbohydrate w/Evening Snack-  1500mL Fluid Restriction ([id number])  Low Sodium  Entered: Dec 19 2023  6:23PM   This patient has been assessed with a concern for Malnutrition and has been determined to have a diagnosis/diagnoses of Severe protein-calorie malnutrition and Morbid obesity (BMI > 40).    This patient is being managed with:   Diet NPO-  NPO for Procedure/Test     NPO Start Date: 29-Dec-2023   NPO Start Time: 12:00  Entered: Dec 29 2023 12:12PM    Diet Consistent Carbohydrate w/Evening Snack-  1500mL Fluid Restriction ([id number])  Low Sodium  Entered: Dec 19 2023  6:23PM

## 2023-12-29 NOTE — DISCHARGE NOTE PROVIDER - CARE PROVIDER_API CALL
Bryan Alamo  Cardiovascular Disease  [address] [facility], [address] [place] [address]  Phone: [phone]  Fax: [phone]  Follow Up Time: 2 weeks    Sari Long  Internal Medicine  [address] [facility], [address] [place] [address]  Phone: [phone]  Fax: [phone]  Follow Up Time: 2 weeks   Bryan Alamo  Cardiovascular Disease  [address] [facility], [address] [place] [address]  Phone: [phone]  Fax: [phone]  Follow Up Time: 2 weeks    Sari Long  Internal Medicine  [address] [facility], [address] [place] [address]  Phone: [phone]  Fax: [phone]  Follow Up Time: 2 weeks   Bryan Alamo  Cardiovascular Disease  [address] [facility], [address]  [place] [address]  Phone: [phone]  Fax: [phone]  Follow Up Time: 2 weeks    Sari Long  Internal Medicine  [address] [facility], [address] [place], [place] [address]  Phone: [phone]  Fax: [phone]  Follow Up Time: 2 weeks    PMD,   Phone: (   )    -  Fax: (   )    -  Follow Up Time:    Bryan Alamo  Cardiovascular Disease  [address] [facility], [address]  [place] [address]  Phone: [phone]  Fax: [phone]  Follow Up Time: 2 weeks    Sari Long  Internal Medicine  [address] [facility], [address] [place], [place] [address]  Phone: [phone]  Fax: [phone]  Follow Up Time: 2 weeks    PMD,   Phone: (   )    -  Fax: (   )    -  Follow Up Time:

## 2023-12-29 NOTE — PROGRESS NOTE ADULT - SUBJECTIVE AND OBJECTIVE BOX
[facility]                                                INFECTIOUS DISEASES  =======================================================                   Cal George#   Martin Huber MD#    Esther Ceballos MD*                           Noreen Marin MD*   Sheba Miller MD*           Diplomates American Board of Internal Medicine & Infectious Diseases                  # [place] Office - Appt - Tel  [phone] Fax [phone]                * [place] Office - Appt - Tel [phone] Fax [phone]                                  Hospital Consult line:  [phone]  =======================================================      [facility]-[id number]  AGUEDA LUIS   follow up for: pneumonia, leukocytosis  no fever  pt feels ok- anxious about procedure  + cough, unable to bring up sputum  dyspnea better  no chest pain  no diarrhea  patient seen and examined.       I have personally reviewed the labs and data; pertinent labs and data are listed in this note; please see below.   ===================================================  REVIEW OF SYSTEMS:  CONSTITUTIONAL:  No Fever or chills  HEENT:  No diplopia or blurred vision.  No earache, sore throat or runny nose.  CARDIOVASCULAR:  No pressure, squeezing, strangling, tightness, heaviness or aching about the chest, neck, axilla or epigastrium.  RESPIRATORY:  + cough, shortness of breath  GASTROINTESTINAL:  No nausea, vomiting or diarrhea.  GENITOURINARY:  No dysuria, frequency or urgency. No Blood in urine  MUSCULOSKELETAL:  no joint aches, no muscle pain  SKIN:  No change in skin, hair or nails.  NEUROLOGIC:  No Headaches, seizures or weakness.  PSYCHIATRIC:  No disorder of thought or mood.  ENDOCRINE:  No heat or cold intolerance  HEMATOLOGICAL:  No easy bruising or bleeding.    =======================================================  Allergies    wool- rash, itch (Other)  adhesives (Rash)  latex (Rash)  Bactrim (Flushing)    Intolerances    Antibiotics:    Other medications:  acetylcysteine  Oral Solution 1200 milliGRAM(s) Oral every 12 hours  acetylcysteine 20%  Inhalation 4 milliLiter(s) Inhalation daily  atorvastatin 80 milliGRAM(s) Oral at bedtime  budesonide  80 MICROgram(s)/formoterol 4.5 MICROgram(s) Inhaler 2 Puff(s) Inhalation two times a day  chlorhexidine 2% Cloths 1 Application(s) Topical <User Schedule>  clotrimazole 1% Cream 1 Application(s) Topical two times a day  dextrose 5%. 1000 milliLiter(s) IV Continuous <Continuous>  dextrose 5%. 1000 milliLiter(s) IV Continuous <Continuous>  dextrose 50% Injectable 12.5 Gram(s) IV Push once  dextrose 50% Injectable 25 Gram(s) IV Push once  dextrose 50% Injectable 25 Gram(s) IV Push once  famotidine    Tablet 20 milliGRAM(s) Oral daily  glucagon  Injectable 1 milliGRAM(s) IntraMuscular once  heparin  Infusion.  Unit(s)/Hr IV Continuous <Continuous>  insulin glargine Injectable (LANTUS) 40 Unit(s) SubCutaneous at bedtime  insulin lispro (ADMELOG) corrective regimen sliding scale   SubCutaneous three times a day before meals  insulin lispro Injectable (ADMELOG) 35 Unit(s) SubCutaneous three times a day before meals  ipratropium    for Nebulization 500 MICROGram(s) Nebulizer every 6 hours  lactulose Syrup 20 Gram(s) Oral daily  levalbuterol Inhalation 0.63 milliGRAM(s) Inhalation every 6 hours  metoprolol tartrate 75 milliGRAM(s) Oral every 8 hours  nystatin Powder 1 Application(s) Topical two times a day  oxyCODONE  ER Tablet 60 milliGRAM(s) Oral every 12 hours  polyethylene glycol 3350 17 Gram(s) Oral daily  senna 2 Tablet(s) Oral at bedtime  torsemide 20 milliGRAM(s) Oral daily    ======================================================  Physical Exam:  ============  T(F): 98.1 (29 Dec 2023 07:45), Max: 98.4 (28 Dec 2023 20:59)  HR: 100 (29 Dec 2023 08:30)  BP: 151/92 (29 Dec 2023 07:45)  RR: 18 (29 Dec 2023 08:30)  SpO2: 95% (29 Dec 2023 08:30) (92% - 100%)  temp max in last 48H T(F): , Max: 98.6 (12-28-23 @ 05:11)    General:  No acute distress. morbidly obese laying in bed, on 4l o2  Eye: no conjunctival pallor, no scleral icterus  HENT: Normocephalic, No pharyngeal erythema, No sinus tenderness.  Neck: Supple, No lymphadenopathy.  Respiratory: coarse BS rt>left to auscultation, Respirations are non-labored.  Cardiovascular: Normal rate, Regular rhythm,  s1+s2  Gastrointestinal: Soft, Non-tender, Non-distended, Normal bowel sounds  Integumentary: b/l chronic venous stasis dermatitis changes, b/l pitting edema, dry skin  Neurologic: Alert, Oriented, No focal deficits  Psychiatric: anxious, tearful  =======================================================  Labs:                        13.4   27.52 )-----------( 227      ( 29 Dec 2023 05:52 )             41.3     12-29    136  |  94<L>  |  69.4<H>  ----------------------------<  218<H>  5.0   |  30.0<H>  |  1.60<H>    Ca    9.3      29 Dec 2023 05:52        Culture - Blood (collected 12-26-23 @ 09:31)  Source: .Blood Blood  Preliminary Report (12-28-23 @ 17:01):    No growth at 48 Hours    Culture - Blood (collected 12-26-23 @ 09:21)  Source: .Blood Blood  Preliminary Report (12-28-23 @ 17:01):    No growth at 48 Hours    Culture - Blood (collected 12-16-23 @ 15:20)  Source: .Blood Blood  Final Report (12-21-23 @ 22:00):    No growth at 5 days    Culture - Blood (collected 12-16-23 @ 15:10)  Source: .Blood Blood  Final Report (12-21-23 @ 22:00):    No growth at 5 days    Culture - Urine (collected 12-16-23 @ 12:30)  Source: Clean Catch Clean Catch (Midstream)  Final Report (12-17-23 @ 19:16):    No growth    Culture - Urine (collected 11-24-23 @ 14:48)  Source: Clean Catch Clean Catch (Midstream)  Final Report (11-25-23 @ 17:32):    <10,000 CFU/mL Normal Urogenital Vicente    Culture - Urine (collected 09-30-23 @ 05:00)  Source: Clean Catch Clean Catch (Midstream)  Final Report (10-03-23 @ 17:50):    10,000 - 49,000 CFU/mL Pseudomonas aeruginosa    10,000 - 49,000 CFU/mL Enterococcus faecium (vancomycin resistant)  Organism: Pseudomonas aeruginosa  Enterococcus faecium (vancomycin resistant) (10-03-23 @ 17:50)  Organism: Enterococcus faecium (vancomycin resistant) (10-03-23 @ 17:50)    Sensitivities:      Method Type: DARREL      -  Ampicillin: R >8 Predicts results to ampicillin/sulbactam, amoxacillin-clavulanate and  piperacillin-tazobactam.      -  Ciprofloxacin: R >2      -  Daptomycin: SDD 4 The breakpoint for SDD (susceptible dose dependent)is based on a dosage regimen of 8-12 mg/kg administered every 24 h in adults and is intended for serious infections due to E. faecium. Consultation with an infectious diseases specialist is recommended.      -  Levofloxacin: R >4      -  Linezolid: S 2      -  Nitrofurantoin: S <=32 Should not be used to treat pyelonephritis.      -  Tetracycline: R >8      -  Vancomycin: R >16  Organism: Pseudomonas aeruginosa (10-03-23 @ 17:50)    Sensitivities:      Method Type: DARREL      -  Amikacin: S <=16      -  Aztreonam: S <=4      -  Cefepime: S <=2      -  Ceftazidime: S 4      -  Ciprofloxacin: S <=0.25      -  Gentamicin: S <=2      -  Imipenem: S 2      -  Levofloxacin: S <=0.5      -  Meropenem: S <=1      -  Piperacillin/Tazobactam: S <=8      -  Tobramycin: S <=2    Culture - Urine (collected 09-23-23 @ 12:30)  Source: Catheterized Catheterized  Final Report (09-27-23 @ 09:04):    >100,000 CFU/ml Proteus mirabilis    <10,000 CFU/ml Normal Urogenital vicente present  Organism: Proteus mirabilis (09-27-23 @ 09:04)  Organism: Proteus mirabilis (09-27-23 @ 09:04)    Sensitivities:      Method Type: DARREL      -  Amikacin: S <=16      -  Amoxicillin/Clavulanic Acid: S <=8/4      -  Ampicillin: S <=8 These ampicillin results predict results for amoxicillin      -  Ampicillin/Sulbactam: S <=4/2      -  Aztreonam: S <=4      -  Cefazolin: S <=2 For uncomplicated UTI with K. pneumoniae, E. coli, or P. mirablis: DARREL <=16 is sensitive and DARREL >=32 is resistant. This also predicts results for oral agents cefaclor, cefdinir, cefpodoxime, cefprozil, cefuroxime axetil, cephalexin and locarbef for uncomplicated UTI. Note that some isolates may be susceptible to these agents while testing resistant to cefazolin.      -  Cefepime: S <=2      -  Cefoxitin: S <=8      -  Ceftriaxone: S <=1      -  Cefuroxime: S <=4      -  Ciprofloxacin: R >2      -  Ertapenem: S <=0.5      -  Gentamicin: S <=2      -  Levofloxacin: R 4      -  Meropenem: S <=1      -  Nitrofurantoin: R >64 Should not be used to treat pyelonephritis      -  Piperacillin/Tazobactam: S <=8      -  Tobramycin: S <=2      -  Trimethoprim/Sulfamethoxazole: S 2/38    Culture - Urine (collected 09-17-23 @ 18:08)  Source: Clean Catch Clean Catch (Midstream)  Final Report (09-20-23 @ 07:41):    <10,000 CFU/mL Normal Urogenital Vicente    Culture - Blood (collected 10-12-22 @ 22:15)  Source: .Blood Blood-Venous  Final Report (10-18-22 @ 02:00):    No Growth Final    Culture - Blood (collected 10-12-22 @ 22:00)  Source: .Blood Blood-Peripheral  Final Report (10-18-22 @ 02:00):    No Growth Final    Culture - Urine (collected 10-12-22 @ 20:57)  Source: Clean Catch Clean Catch (Midstream)  Final Report (10-13-22 @ 23:42):    >=3 organisms. Probable collection contamination.    Culture - Urine (collected 01-25-22 @ 16:13)  Source: Catheterized Catheterized  Final Report (01-26-22 @ 16:09):    <10,000 CFU/mL Normal Urogenital Vicente                                                    [facility]                                                INFECTIOUS DISEASES  =======================================================                   Cal George#   Martin Huber MD#    Esther Ceballos MD*                           Noreen Marin MD*   Sheba Miller MD*           Diplomates American Board of Internal Medicine & Infectious Diseases                  # [place] Office - Appt - Tel  [phone] Fax [phone]                * [place] Office - Appt - Tel [phone] Fax [phone]                                  Hospital Consult line:  [phone]  =======================================================      [facility]-[id number]  AGUEDA LUIS   follow up for: pneumonia, leukocytosis  no fever  pt feels ok- anxious about procedure  + cough, unable to bring up sputum  dyspnea better  no chest pain  no diarrhea  patient seen and examined.       I have personally reviewed the labs and data; pertinent labs and data are listed in this note; please see below.   ===================================================  REVIEW OF SYSTEMS:  CONSTITUTIONAL:  No Fever or chills  HEENT:  No diplopia or blurred vision.  No earache, sore throat or runny nose.  CARDIOVASCULAR:  No pressure, squeezing, strangling, tightness, heaviness or aching about the chest, neck, axilla or epigastrium.  RESPIRATORY:  + cough, shortness of breath  GASTROINTESTINAL:  No nausea, vomiting or diarrhea.  GENITOURINARY:  No dysuria, frequency or urgency. No Blood in urine  MUSCULOSKELETAL:  no joint aches, no muscle pain  SKIN:  No change in skin, hair or nails.  NEUROLOGIC:  No Headaches, seizures or weakness.  PSYCHIATRIC:  No disorder of thought or mood.  ENDOCRINE:  No heat or cold intolerance  HEMATOLOGICAL:  No easy bruising or bleeding.    =======================================================  Allergies    wool- rash, itch (Other)  adhesives (Rash)  latex (Rash)  Bactrim (Flushing)    Intolerances    Antibiotics:    Other medications:  acetylcysteine  Oral Solution 1200 milliGRAM(s) Oral every 12 hours  acetylcysteine 20%  Inhalation 4 milliLiter(s) Inhalation daily  atorvastatin 80 milliGRAM(s) Oral at bedtime  budesonide  80 MICROgram(s)/formoterol 4.5 MICROgram(s) Inhaler 2 Puff(s) Inhalation two times a day  chlorhexidine 2% Cloths 1 Application(s) Topical <User Schedule>  clotrimazole 1% Cream 1 Application(s) Topical two times a day  dextrose 5%. 1000 milliLiter(s) IV Continuous <Continuous>  dextrose 5%. 1000 milliLiter(s) IV Continuous <Continuous>  dextrose 50% Injectable 12.5 Gram(s) IV Push once  dextrose 50% Injectable 25 Gram(s) IV Push once  dextrose 50% Injectable 25 Gram(s) IV Push once  famotidine    Tablet 20 milliGRAM(s) Oral daily  glucagon  Injectable 1 milliGRAM(s) IntraMuscular once  heparin  Infusion.  Unit(s)/Hr IV Continuous <Continuous>  insulin glargine Injectable (LANTUS) 40 Unit(s) SubCutaneous at bedtime  insulin lispro (ADMELOG) corrective regimen sliding scale   SubCutaneous three times a day before meals  insulin lispro Injectable (ADMELOG) 35 Unit(s) SubCutaneous three times a day before meals  ipratropium    for Nebulization 500 MICROGram(s) Nebulizer every 6 hours  lactulose Syrup 20 Gram(s) Oral daily  levalbuterol Inhalation 0.63 milliGRAM(s) Inhalation every 6 hours  metoprolol tartrate 75 milliGRAM(s) Oral every 8 hours  nystatin Powder 1 Application(s) Topical two times a day  oxyCODONE  ER Tablet 60 milliGRAM(s) Oral every 12 hours  polyethylene glycol 3350 17 Gram(s) Oral daily  senna 2 Tablet(s) Oral at bedtime  torsemide 20 milliGRAM(s) Oral daily    ======================================================  Physical Exam:  ============  T(F): 98.1 (29 Dec 2023 07:45), Max: 98.4 (28 Dec 2023 20:59)  HR: 100 (29 Dec 2023 08:30)  BP: 151/92 (29 Dec 2023 07:45)  RR: 18 (29 Dec 2023 08:30)  SpO2: 95% (29 Dec 2023 08:30) (92% - 100%)  temp max in last 48H T(F): , Max: 98.6 (12-28-23 @ 05:11)    General:  No acute distress. morbidly obese laying in bed, on 4l o2  Eye: no conjunctival pallor, no scleral icterus  HENT: Normocephalic, No pharyngeal erythema, No sinus tenderness.  Neck: Supple, No lymphadenopathy.  Respiratory: coarse BS rt>left to auscultation, Respirations are non-labored.  Cardiovascular: Normal rate, Regular rhythm,  s1+s2  Gastrointestinal: Soft, Non-tender, Non-distended, Normal bowel sounds  Integumentary: b/l chronic venous stasis dermatitis changes, b/l pitting edema, dry skin  Neurologic: Alert, Oriented, No focal deficits  Psychiatric: anxious, tearful  =======================================================  Labs:                        13.4   27.52 )-----------( 227      ( 29 Dec 2023 05:52 )             41.3     12-29    136  |  94<L>  |  69.4<H>  ----------------------------<  218<H>  5.0   |  30.0<H>  |  1.60<H>    Ca    9.3      29 Dec 2023 05:52        Culture - Blood (collected 12-26-23 @ 09:31)  Source: .Blood Blood  Preliminary Report (12-28-23 @ 17:01):    No growth at 48 Hours    Culture - Blood (collected 12-26-23 @ 09:21)  Source: .Blood Blood  Preliminary Report (12-28-23 @ 17:01):    No growth at 48 Hours    Culture - Blood (collected 12-16-23 @ 15:20)  Source: .Blood Blood  Final Report (12-21-23 @ 22:00):    No growth at 5 days    Culture - Blood (collected 12-16-23 @ 15:10)  Source: .Blood Blood  Final Report (12-21-23 @ 22:00):    No growth at 5 days    Culture - Urine (collected 12-16-23 @ 12:30)  Source: Clean Catch Clean Catch (Midstream)  Final Report (12-17-23 @ 19:16):    No growth    Culture - Urine (collected 11-24-23 @ 14:48)  Source: Clean Catch Clean Catch (Midstream)  Final Report (11-25-23 @ 17:32):    <10,000 CFU/mL Normal Urogenital Vicente    Culture - Urine (collected 09-30-23 @ 05:00)  Source: Clean Catch Clean Catch (Midstream)  Final Report (10-03-23 @ 17:50):    10,000 - 49,000 CFU/mL Pseudomonas aeruginosa    10,000 - 49,000 CFU/mL Enterococcus faecium (vancomycin resistant)  Organism: Pseudomonas aeruginosa  Enterococcus faecium (vancomycin resistant) (10-03-23 @ 17:50)  Organism: Enterococcus faecium (vancomycin resistant) (10-03-23 @ 17:50)    Sensitivities:      Method Type: DARREL      -  Ampicillin: R >8 Predicts results to ampicillin/sulbactam, amoxacillin-clavulanate and  piperacillin-tazobactam.      -  Ciprofloxacin: R >2      -  Daptomycin: SDD 4 The breakpoint for SDD (susceptible dose dependent)is based on a dosage regimen of 8-12 mg/kg administered every 24 h in adults and is intended for serious infections due to E. faecium. Consultation with an infectious diseases specialist is recommended.      -  Levofloxacin: R >4      -  Linezolid: S 2      -  Nitrofurantoin: S <=32 Should not be used to treat pyelonephritis.      -  Tetracycline: R >8      -  Vancomycin: R >16  Organism: Pseudomonas aeruginosa (10-03-23 @ 17:50)    Sensitivities:      Method Type: DARREL      -  Amikacin: S <=16      -  Aztreonam: S <=4      -  Cefepime: S <=2      -  Ceftazidime: S 4      -  Ciprofloxacin: S <=0.25      -  Gentamicin: S <=2      -  Imipenem: S 2      -  Levofloxacin: S <=0.5      -  Meropenem: S <=1      -  Piperacillin/Tazobactam: S <=8      -  Tobramycin: S <=2    Culture - Urine (collected 09-23-23 @ 12:30)  Source: Catheterized Catheterized  Final Report (09-27-23 @ 09:04):    >100,000 CFU/ml Proteus mirabilis    <10,000 CFU/ml Normal Urogenital vicente present  Organism: Proteus mirabilis (09-27-23 @ 09:04)  Organism: Proteus mirabilis (09-27-23 @ 09:04)    Sensitivities:      Method Type: DARREL      -  Amikacin: S <=16      -  Amoxicillin/Clavulanic Acid: S <=8/4      -  Ampicillin: S <=8 These ampicillin results predict results for amoxicillin      -  Ampicillin/Sulbactam: S <=4/2      -  Aztreonam: S <=4      -  Cefazolin: S <=2 For uncomplicated UTI with K. pneumoniae, E. coli, or P. mirablis: DARREL <=16 is sensitive and DARREL >=32 is resistant. This also predicts results for oral agents cefaclor, cefdinir, cefpodoxime, cefprozil, cefuroxime axetil, cephalexin and locarbef for uncomplicated UTI. Note that some isolates may be susceptible to these agents while testing resistant to cefazolin.      -  Cefepime: S <=2      -  Cefoxitin: S <=8      -  Ceftriaxone: S <=1      -  Cefuroxime: S <=4      -  Ciprofloxacin: R >2      -  Ertapenem: S <=0.5      -  Gentamicin: S <=2      -  Levofloxacin: R 4      -  Meropenem: S <=1      -  Nitrofurantoin: R >64 Should not be used to treat pyelonephritis      -  Piperacillin/Tazobactam: S <=8      -  Tobramycin: S <=2      -  Trimethoprim/Sulfamethoxazole: S 2/38    Culture - Urine (collected 09-17-23 @ 18:08)  Source: Clean Catch Clean Catch (Midstream)  Final Report (09-20-23 @ 07:41):    <10,000 CFU/mL Normal Urogenital Vicente    Culture - Blood (collected 10-12-22 @ 22:15)  Source: .Blood Blood-Venous  Final Report (10-18-22 @ 02:00):    No Growth Final    Culture - Blood (collected 10-12-22 @ 22:00)  Source: .Blood Blood-Peripheral  Final Report (10-18-22 @ 02:00):    No Growth Final    Culture - Urine (collected 10-12-22 @ 20:57)  Source: Clean Catch Clean Catch (Midstream)  Final Report (10-13-22 @ 23:42):    >=3 organisms. Probable collection contamination.    Culture - Urine (collected 01-25-22 @ 16:13)  Source: Catheterized Catheterized  Final Report (01-26-22 @ 16:09):    <10,000 CFU/mL Normal Urogenital Vicente

## 2023-12-29 NOTE — PROGRESS NOTE ADULT - ASSESSMENT
69 yo female with HTN, HLD, DM2, HFpEF, CKD III, DVT, Uterine CA, COPD on home o2 who presented with complaints of shortness of breath. Admitted with acute on chronic resp failure and acute HFpEF exacerbation with likely with Superimposed bacterial Pneumonia in setting of RSV and new onset AFib and worsening GFR.    1. T2DM complicated by CKD - steroid induced hyperglycemia  - On steroid taper, will decrease doses (Prednisone 30mg today, 20mg tomorrow)  - Decrease lantus to 40 units BID  - Decrease premeal admelog to 35 units TID with meals  - Continue sliding scale coverage    2. Acute on chronic respiratory failure and Acute HFpEF exacerbation with likely with Superimposed bacterial Pneumonia in setting of RSV  - IV Abx  - Steroids taper as per primary team    3. HLD  - Continue statin    4. Atrial fibrillation  - Cardiology following, planning for cardiac cath 67 yo female with HTN, HLD, DM2, HFpEF, CKD III, DVT, Uterine CA, COPD on home o2 who presented with complaints of shortness of breath. Admitted with acute on chronic resp failure and acute HFpEF exacerbation with likely with Superimposed bacterial Pneumonia in setting of RSV and new onset AFib and worsening GFR.    1. T2DM complicated by CKD - steroid induced hyperglycemia  - On steroid taper, will decrease doses (Prednisone 30mg today, 20mg tomorrow)  - Decrease lantus to 40 units BID  - Decrease premeal admelog to 35 units TID with meals  - Continue sliding scale coverage    2. Acute on chronic respiratory failure and Acute HFpEF exacerbation with likely with Superimposed bacterial Pneumonia in setting of RSV  - IV Abx  - Steroids taper as per primary team    3. HLD  - Continue statin    4. Atrial fibrillation  - Cardiology following, planning for cardiac cath

## 2023-12-29 NOTE — PROGRESS NOTE ADULT - ASSESSMENT
67 y/o female PMH morbid obesity, HFpEF (EF 55-60% 8/2023), on home O2 4L, pulmonary HTN, was on Eliquis for presumed PE and severe pulmonary hypertension (PASP of 70 mmHg). super morbid obesity, asthma, CKD3, IDDM2, uterine cancer s/p MEGAN, DVT LLE (2004), chronic leg edema 2/2 venous stasis, spinal stenosis with chronic back pain who presents with midsternal chest pressure and shortness of breath. Patient converted to AFib on 12/21.     Acute on chronic HFpEF, NYHA III, ACC C  Bacterial pneumonia  Pulmonary HYN, Severe ( PASP 70 mmHg)  Super morbid obesity  CKD stage 3  insulin dependent diabetes mellitus  ?presumed PE  hx of spinal stenosis with chronic back pain  hx of LLE DVT 2004  chronic lower extremity edema  venous stasis dermatitis  asthma  hypoxemic respiratory failure        Clearance received from ID, hospitalist medicine, Nephrology  Patient agrees to R+ LHC    proceed with R+LHC    further plan pending invasive investigation.  continue GDMT      needs lifestyle cardiovascular modifications, shes contemplating. 69 y/o female PMH morbid obesity, HFpEF (EF 55-60% 8/2023), on home O2 4L, pulmonary HTN, was on Eliquis for presumed PE and severe pulmonary hypertension (PASP of 70 mmHg). super morbid obesity, asthma, CKD3, IDDM2, uterine cancer s/p MEGAN, DVT LLE (2004), chronic leg edema 2/2 venous stasis, spinal stenosis with chronic back pain who presents with midsternal chest pressure and shortness of breath. Patient converted to AFib on 12/21.     Acute on chronic HFpEF, NYHA III, ACC C  Bacterial pneumonia  Pulmonary HYN, Severe ( PASP 70 mmHg)  Super morbid obesity  CKD stage 3  insulin dependent diabetes mellitus  ?presumed PE  hx of spinal stenosis with chronic back pain  hx of LLE DVT 2004  chronic lower extremity edema  venous stasis dermatitis  asthma  hypoxemic respiratory failure        Clearance received from ID, hospitalist medicine, Nephrology  Patient agrees to R+ LHC    proceed with R+LHC    further plan pending invasive investigation.  continue GDMT      needs lifestyle cardiovascular modifications, shes contemplating.

## 2023-12-29 NOTE — PROGRESS NOTE ADULT - ASSESSMENT
67 yo female with HTN, HLD, DM2, HFpEF, CKD III, DVT, Uterine CA, COPD on home o2 who presented with complaints of shortness of breath. Patient reports that she has been sick all week and was recently started on Vantin and steroids while at the nursing home. Patient reports that her dyspnea just worsened and she told the nursing home to send her in for an evaluation. While in the ED, patient was placed on BIPAP and was given IV lasix with some improvement. RVP then results as RSV +. Patient had a CT scan which also showed pneumonia and pulmonary HTN. Admission to medicine was requested for further management.    #new afib:  echo w/ preserved EF  - EP consulted, no DCCV given resp issues  - cardiology following, cardio planning for R/LHC now that respiratory condition improved  - cardio to f/u regarding date and time for cath  - c/w loprssor 50mg q8hr  - c/w heparin drip for [id number] 6 until LHC with cardio    # Acute on chronic respiratory failure  # Acute HFpEF exacerbation with likely with Superimposed bacterial Pneumonia in setting of RSV  - pt remains afebrile and resp status improving, now on NC (likely new baseline)  - CT chest reads worsening middle lobe PNA  - sputum clx ordered for last several days, pt unable to bring up sputum  - repeat MRSA PCR negative  - ID consulted, WBC likely reactive to steroids  - antibiotics stopped on 12/28, WBC trended down  - pt high risk for recurrent aspiration  - repeat blood clx NGTD  - tapering steroids   - levalbuterol and ipratropium q6hrs, symbicort BID   - IV Lasix on hold for JACQUES  - BIPAP qhs  - pulm following     # JACQUES  - likely combination of pre-renal and ATN  - lasix on hold  - will pre-hydrate before and after cath  - nephrology consulted  - trend BMP    #Chronic pain  - continue home Pain meds of Oxycodone 60 mg q12h and Oxycodone 5mg q6h PRN    # HLD  - Atorvastatin for rosuvastatin    # COPD  - tapering steroids   - Symbicort, Spiriva, albuterol  - pulm following     #DM  #steroid induced hyperglycemia  - lantus/premeal insulin per endo  - lispro sliding scale    DVT prophylaxis: heparin ggt  Dispo: pending cath and PT eval     69 yo female with HTN, HLD, DM2, HFpEF, CKD III, DVT, Uterine CA, COPD on home o2 who presented with complaints of shortness of breath. Patient reports that she has been sick all week and was recently started on Vantin and steroids while at the nursing home. Patient reports that her dyspnea just worsened and she told the nursing home to send her in for an evaluation. While in the ED, patient was placed on BIPAP and was given IV lasix with some improvement. RVP then results as RSV +. Patient had a CT scan which also showed pneumonia and pulmonary HTN. Admission to medicine was requested for further management.    #new afib:  echo w/ preserved EF  - EP consulted, no DCCV given resp issues  - cardiology following, cardio planning for R/LHC now that respiratory condition improved  - cardio to f/u regarding date and time for cath  - c/w loprssor 50mg q8hr  - c/w heparin drip for [id number] 6 until LHC with cardio    # Acute on chronic respiratory failure  # Acute HFpEF exacerbation with likely with Superimposed bacterial Pneumonia in setting of RSV  - pt remains afebrile and resp status improving, now on NC (likely new baseline)  - CT chest reads worsening middle lobe PNA  - sputum clx ordered for last several days, pt unable to bring up sputum  - repeat MRSA PCR negative  - ID consulted, WBC likely reactive to steroids  - antibiotics stopped on 12/28, WBC trended down  - pt high risk for recurrent aspiration  - repeat blood clx NGTD  - tapering steroids   - levalbuterol and ipratropium q6hrs, symbicort BID   - IV Lasix on hold for JACQUES  - BIPAP qhs  - pulm following     # JACQUES  - likely combination of pre-renal and ATN  - lasix on hold  - will pre-hydrate before and after cath  - nephrology consulted  - trend BMP    #Chronic pain  - continue home Pain meds of Oxycodone 60 mg q12h and Oxycodone 5mg q6h PRN    # HLD  - Atorvastatin for rosuvastatin    # COPD  - tapering steroids   - Symbicort, Spiriva, albuterol  - pulm following     #DM  #steroid induced hyperglycemia  - lantus/premeal insulin per endo  - lispro sliding scale    DVT prophylaxis: heparin ggt  Dispo: pending cath and PT eval     69 yo female with HTN, HLD, DM2, HFpEF, CKD III, DVT, Uterine CA, COPD on home o2 who presented with complaints of shortness of breath. Patient reports that she has been sick all week and was recently started on Vantin and steroids while at the nursing home. Patient reports that her dyspnea just worsened and she told the nursing home to send her in for an evaluation. While in the ED, patient was placed on BIPAP and was given IV lasix with some improvement. RVP then results as RSV +. Patient had a CT scan which also showed pneumonia and pulmonary HTN. Admission to medicine was requested for further management.    #new afib:  echo w/ preserved EF  - EP consulted, no DCCV given resp issues  - cardiology following, cardio planning for R/LHC now that respiratory condition improved  - cardio to f/u regarding date and time for cath  - c/w loprssor 50mg q8hr  - c/w heparin drip for [id number] 6 until LHC with cardio  - switch to xarelto afterwards    # Acute on chronic respiratory failure  # Acute HFpEF exacerbation with likely with Superimposed bacterial Pneumonia in setting of RSV  - pt remains afebrile and resp status improving, now on NC (likely new baseline)  - CT chest reads worsening middle lobe PNA  - sputum clx ordered for last several days, pt unable to bring up sputum  - repeat MRSA PCR negative  - ID consulted, WBC likely reactive to steroids  - antibiotics stopped on 12/28, WBC trended down  - pt high risk for recurrent aspiration  - repeat blood clx NGTD  - tapering steroids   - levalbuterol and ipratropium q6hrs, symbicort BID   - IV Lasix on hold for JACQUES  - BIPAP qhs  - pulm following     # previous suspicion for PE/ DVT on admission - now thought to be unlikely  - Patient has empirically been treated for PE/DVT since August  - No scans noted to have PE/DVT  - duplex lower ext to without dvt   - ddimer negative    # JACQUES  - likely combination of pre-renal and ATN  - lasix on hold  - will pre-hydrate before and after cath  - nephrology consulted  - trend BMP    #Chronic pain  - continue home Pain meds of Oxycodone 60 mg q12h and Oxycodone 5mg q6h PRN    # HLD  - Atorvastatin for rosuvastatin    # COPD  - tapering steroids   - Symbicort, Spiriva, albuterol  - pulm following     #DM  #steroid induced hyperglycemia  - lantus/premeal insulin per endo  - lispro sliding scale    DVT prophylaxis: heparin ggt  Dispo: pending cath and PT eval     67 yo female with HTN, HLD, DM2, HFpEF, CKD III, DVT, Uterine CA, COPD on home o2 who presented with complaints of shortness of breath. Patient reports that she has been sick all week and was recently started on Vantin and steroids while at the nursing home. Patient reports that her dyspnea just worsened and she told the nursing home to send her in for an evaluation. While in the ED, patient was placed on BIPAP and was given IV lasix with some improvement. RVP then results as RSV +. Patient had a CT scan which also showed pneumonia and pulmonary HTN. Admission to medicine was requested for further management.    #new afib:  echo w/ preserved EF  - EP consulted, no DCCV given resp issues  - cardiology following, cardio planning for R/LHC now that respiratory condition improved  - cardio to f/u regarding date and time for cath  - c/w loprssor 50mg q8hr  - c/w heparin drip for [id number] 6 until LHC with cardio  - switch to xarelto afterwards    # Acute on chronic respiratory failure  # Acute HFpEF exacerbation with likely with Superimposed bacterial Pneumonia in setting of RSV  - pt remains afebrile and resp status improving, now on NC (likely new baseline)  - CT chest reads worsening middle lobe PNA  - sputum clx ordered for last several days, pt unable to bring up sputum  - repeat MRSA PCR negative  - ID consulted, WBC likely reactive to steroids  - antibiotics stopped on 12/28, WBC trended down  - pt high risk for recurrent aspiration  - repeat blood clx NGTD  - tapering steroids   - levalbuterol and ipratropium q6hrs, symbicort BID   - IV Lasix on hold for JACQUES  - BIPAP qhs  - pulm following     # previous suspicion for PE/ DVT on admission - now thought to be unlikely  - Patient has empirically been treated for PE/DVT since August  - No scans noted to have PE/DVT  - duplex lower ext to without dvt   - ddimer negative    # JACQUES  - likely combination of pre-renal and ATN  - lasix on hold  - will pre-hydrate before and after cath  - nephrology consulted  - trend BMP    #Chronic pain  - continue home Pain meds of Oxycodone 60 mg q12h and Oxycodone 5mg q6h PRN    # HLD  - Atorvastatin for rosuvastatin    # COPD  - tapering steroids   - Symbicort, Spiriva, albuterol  - pulm following     #DM  #steroid induced hyperglycemia  - lantus/premeal insulin per endo  - lispro sliding scale    DVT prophylaxis: heparin ggt  Dispo: pending cath and PT eval     69 yo female with HTN, HLD, DM2, HFpEF, CKD III, DVT, Uterine CA, COPD on home o2 who presented with complaints of shortness of breath. Patient reports that she has been sick all week and was recently started on Vantin and steroids while at the nursing home. Patient reports that her dyspnea just worsened and she told the nursing home to send her in for an evaluation. While in the ED, patient was placed on BIPAP and was given IV lasix with some improvement. RVP then results as RSV +. Patient had a CT scan which also showed pneumonia and pulmonary HTN. Admission to medicine was requested for further management.    #new afib:  echo w/ preserved EF  - EP consulted, no DCCV given resp issues  - cardiology following, cardio planning for R/LHC now that respiratory condition improved  - cardio to f/u regarding date and time for cath  - c/w loprssor 50mg q8hr  - c/w heparin drip for [id number] 6 until LHC with cardio  - switch to xarelto afterwards    # Acute on chronic respiratory failure  # Acute HFpEF exacerbation with likely with Superimposed bacterial Pneumonia in setting of RSV  - pt remains afebrile and resp status improving, now on NC (likely new baseline)  - CT chest reads worsening middle lobe PNA  - sputum clx ordered for last several days, pt unable to bring up sputum  - repeat MRSA PCR negative  - ID consulted, WBC likely reactive to steroids  - antibiotics stopped on 12/28, WBC trended down  - pt high risk for recurrent aspiration  - repeat blood clx NGTD  - tapering steroids   - levalbuterol and ipratropium q6hrs, symbicort BID   - IV Lasix on hold for JACQUES  - BIPAP qhs  - pulm following     # previous suspicion for PE/ DVT on admission  - Patient has empirically been treated for PE/DVT since August  - No scans noted to have PE/DVT  - duplex lower ext to without dvt   - ddimer negative    # JACQUES  - likely combination of pre-renal and ATN  - lasix on hold  - will pre-hydrate before and after cath  - nephrology consulted  - trend BMP    #Chronic pain  - continue home Pain meds of Oxycodone 60 mg q12h and Oxycodone 5mg q6h PRN    # HLD  - Atorvastatin for rosuvastatin    # COPD  - tapering steroids   - Symbicort, Spiriva, albuterol  - pulm following     #DM  #steroid induced hyperglycemia  - lantus/premeal insulin per endo  - lispro sliding scale    DVT prophylaxis: heparin ggt  Dispo: pending cath and PT eval     69 yo female with HTN, HLD, DM2, HFpEF, CKD III, DVT, Uterine CA, COPD on home o2 who presented with complaints of shortness of breath. Patient reports that she has been sick all week and was recently started on Vantin and steroids while at the nursing home. Patient reports that her dyspnea just worsened and she told the nursing home to send her in for an evaluation. While in the ED, patient was placed on BIPAP and was given IV lasix with some improvement. RVP then results as RSV +. Patient had a CT scan which also showed pneumonia and pulmonary HTN. Admission to medicine was requested for further management.    #new afib:  echo w/ preserved EF  - EP consulted, no DCCV given resp issues  - cardiology following, cardio planning for R/LHC now that respiratory condition improved  - cardio to f/u regarding date and time for cath  - c/w loprssor 50mg q8hr  - c/w heparin drip for [id number] 6 until LHC with cardio  - switch to xarelto afterwards    # Acute on chronic respiratory failure  # Acute HFpEF exacerbation with likely with Superimposed bacterial Pneumonia in setting of RSV  - pt remains afebrile and resp status improving, now on NC (likely new baseline)  - CT chest reads worsening middle lobe PNA  - sputum clx ordered for last several days, pt unable to bring up sputum  - repeat MRSA PCR negative  - ID consulted, WBC likely reactive to steroids  - antibiotics stopped on 12/28, WBC trended down  - pt high risk for recurrent aspiration  - repeat blood clx NGTD  - tapering steroids   - levalbuterol and ipratropium q6hrs, symbicort BID   - IV Lasix on hold for JACQUES  - BIPAP qhs  - pulm following     # previous suspicion for PE/ DVT on admission  - Patient has empirically been treated for PE/DVT since August  - No scans noted to have PE/DVT  - duplex lower ext to without dvt   - ddimer negative    # JACQUES  - likely combination of pre-renal and ATN  - lasix on hold  - will pre-hydrate before and after cath  - nephrology consulted  - trend BMP    #Chronic pain  - continue home Pain meds of Oxycodone 60 mg q12h and Oxycodone 5mg q6h PRN    # HLD  - Atorvastatin for rosuvastatin    # COPD  - tapering steroids   - Symbicort, Spiriva, albuterol  - pulm following     #DM  #steroid induced hyperglycemia  - lantus/premeal insulin per endo  - lispro sliding scale    DVT prophylaxis: heparin ggt  Dispo: pending cath and PT eval     67 yo female with HTN, HLD, DM2, HFpEF, CKD III, DVT, Uterine CA, COPD on home o2 who presented with complaints of shortness of breath. Patient reports that she has been sick all week and was recently started on Vantin and steroids while at the nursing home. Patient reports that her dyspnea just worsened and she told the nursing home to send her in for an evaluation. While in the ED, patient was placed on BIPAP and was given IV lasix with some improvement. RVP then results as RSV +. Patient had a CT scan which also showed pneumonia and pulmonary HTN. Admission to medicine was requested for further management.    #new afib:  echo w/ preserved EF  - EP consulted, no DCCV given resp issues  - cardiology following, cardio planning for R/LHC now that respiratory condition improved  - cardio to f/u regarding date and time for cath  - c/w loprssor 50mg q8hr  - c/w heparin drip for [id number] 6 until LHC with cardio  - switch to xarelto tomorrow    # Acute on chronic respiratory failure  # Acute HFpEF exacerbation with likely with Superimposed bacterial Pneumonia in setting of RSV  - pt remains afebrile and resp status improving, now on NC (likely new baseline)  - CT chest reads worsening middle lobe PNA  - sputum clx ordered for last several days, pt unable to bring up sputum  - repeat MRSA PCR negative  - ID consulted, WBC likely reactive to steroids  - antibiotics stopped on 12/28, WBC trended down  - pt high risk for recurrent aspiration  - repeat blood clx NGTD  - tapering steroids   - levalbuterol and ipratropium q6hrs, symbicort BID   - IV Lasix on hold for JACQUES  - BIPAP qhs  - pulm following     # previous suspicion for PE/ DVT on admission  - Patient has empirically been treated for PE/DVT since August  - No scans noted to have PE/DVT  - duplex lower ext to without dvt   - ddimer negative    # JACQUES  - likely combination of pre-renal and ATN  - lasix on hold  - will pre-hydrate before and after cath  - nephrology consulted  - trend BMP    #Chronic pain  - continue home Pain meds of Oxycodone 60 mg q12h and Oxycodone 5mg q6h PRN    # HLD  - Atorvastatin for rosuvastatin    # COPD  - tapering steroids   - Symbicort, Spiriva, albuterol  - pulm following     #DM  #steroid induced hyperglycemia  - lantus/premeal insulin per endo  - lispro sliding scale    DVT prophylaxis: heparin ggt  Dispo: pending cath and PT eval     69 yo female with HTN, HLD, DM2, HFpEF, CKD III, DVT, Uterine CA, COPD on home o2 who presented with complaints of shortness of breath. Patient reports that she has been sick all week and was recently started on Vantin and steroids while at the nursing home. Patient reports that her dyspnea just worsened and she told the nursing home to send her in for an evaluation. While in the ED, patient was placed on BIPAP and was given IV lasix with some improvement. RVP then results as RSV +. Patient had a CT scan which also showed pneumonia and pulmonary HTN. Admission to medicine was requested for further management.    #new afib:  echo w/ preserved EF  - EP consulted, no DCCV given resp issues  - cardiology following, cardio planning for R/LHC now that respiratory condition improved  - cardio to f/u regarding date and time for cath  - c/w loprssor 50mg q8hr  - c/w heparin drip for [id number] 6 until LHC with cardio  - switch to xarelto tomorrow    # Acute on chronic respiratory failure  # Acute HFpEF exacerbation with likely with Superimposed bacterial Pneumonia in setting of RSV  - pt remains afebrile and resp status improving, now on NC (likely new baseline)  - CT chest reads worsening middle lobe PNA  - sputum clx ordered for last several days, pt unable to bring up sputum  - repeat MRSA PCR negative  - ID consulted, WBC likely reactive to steroids  - antibiotics stopped on 12/28, WBC trended down  - pt high risk for recurrent aspiration  - repeat blood clx NGTD  - tapering steroids   - levalbuterol and ipratropium q6hrs, symbicort BID   - IV Lasix on hold for JACQUES  - BIPAP qhs  - pulm following     # previous suspicion for PE/ DVT on admission  - Patient has empirically been treated for PE/DVT since August  - No scans noted to have PE/DVT  - duplex lower ext to without dvt   - ddimer negative    # JACQUES  - likely combination of pre-renal and ATN  - lasix on hold  - will pre-hydrate before and after cath  - nephrology consulted  - trend BMP    #Chronic pain  - continue home Pain meds of Oxycodone 60 mg q12h and Oxycodone 5mg q6h PRN    # HLD  - Atorvastatin for rosuvastatin    # COPD  - tapering steroids   - Symbicort, Spiriva, albuterol  - pulm following     #DM  #steroid induced hyperglycemia  - lantus/premeal insulin per endo  - lispro sliding scale    DVT prophylaxis: heparin ggt  Dispo: pending cath and PT eval     69 yo female with HTN, HLD, DM2, HFpEF, CKD III, DVT, Uterine CA, COPD on home o2 who presented with complaints of shortness of breath. Patient reports that she has been sick all week and was recently started on Vantin and steroids while at the nursing home. Patient reports that her dyspnea just worsened and she told the nursing home to send her in for an evaluation. While in the ED, patient was placed on BIPAP and was given IV lasix with some improvement. RVP then results as RSV +. Patient had a CT scan which also showed pneumonia and pulmonary HTN. Admission to medicine was requested for further management.    #new afib:  echo w/ preserved EF  - EP consulted, no DCCV given resp issues  - cardiology following, cardio planning for R/LHC now that respiratory condition improved  - cardio to f/u regarding date and time for cath  - c/w loprssor 50mg q8hr  - c/w heparin drip for [id number] 6 until LHC with cardio  - switch to xarelto tomorrow    # Acute on chronic respiratory failure  # Acute HFpEF exacerbation with likely with Superimposed bacterial Pneumonia in setting of RSV  # Severe pulm HTN  - pt remains afebrile and resp status improving, now on NC (likely new baseline)  - CT chest reads worsening middle lobe PNA  - sputum clx ordered for last several days, pt unable to bring up sputum  - repeat MRSA PCR negative  - ID consulted, WBC likely reactive to steroids  - antibiotics stopped on 12/28, WBC trended down  - pt high risk for recurrent aspiration  - repeat blood clx NGTD  - tapering steroids   - levalbuterol and ipratropium q6hrs, symbicort BID   - IV Lasix on hold for JACQUES  - BIPAP qhs  - pulm to f/u regarding severe pulm HTN seen on cath     # previous suspicion for PE/ DVT on admission  - Patient has empirically been treated for PE/DVT since August  - No scans noted to have PE/DVT  - duplex lower ext to without dvt   - ddimer negative    # JACQUES  - likely combination of pre-renal and ATN  - lasix on hold  - will pre-hydrate before and after cath  - nephrology consulted  - trend BMP    #Chronic pain  - continue home Pain meds of Oxycodone 60 mg q12h and Oxycodone 5mg q6h PRN    # HLD  - Atorvastatin for rosuvastatin    # COPD  - tapering steroids   - Symbicort, Spiriva, albuterol  - pulm following     #DM  #steroid induced hyperglycemia  - lantus/premeal insulin per endo  - lispro sliding scale    DVT prophylaxis: heparin ggt  Dispo: pending cath and PT eval     69 yo female with HTN, HLD, DM2, HFpEF, CKD III, DVT, Uterine CA, COPD on home o2 who presented with complaints of shortness of breath. Patient reports that she has been sick all week and was recently started on Vantin and steroids while at the nursing home. Patient reports that her dyspnea just worsened and she told the nursing home to send her in for an evaluation. While in the ED, patient was placed on BIPAP and was given IV lasix with some improvement. RVP then results as RSV +. Patient had a CT scan which also showed pneumonia and pulmonary HTN. Admission to medicine was requested for further management.    #new afib:  echo w/ preserved EF  - EP consulted, no DCCV given resp issues  - cardiology following, cardio planning for R/LHC now that respiratory condition improved  - cardio to f/u regarding date and time for cath  - c/w loprssor 50mg q8hr  - c/w heparin drip for [id number] 6 until LHC with cardio  - switch to xarelto tomorrow    # Acute on chronic respiratory failure  # Acute HFpEF exacerbation with likely with Superimposed bacterial Pneumonia in setting of RSV  # Severe pulm HTN  - pt remains afebrile and resp status improving, now on NC (likely new baseline)  - CT chest reads worsening middle lobe PNA  - sputum clx ordered for last several days, pt unable to bring up sputum  - repeat MRSA PCR negative  - ID consulted, WBC likely reactive to steroids  - antibiotics stopped on 12/28, WBC trended down  - pt high risk for recurrent aspiration  - repeat blood clx NGTD  - tapering steroids   - levalbuterol and ipratropium q6hrs, symbicort BID   - IV Lasix on hold for JACQUES  - BIPAP qhs  - pulm to f/u regarding severe pulm HTN seen on cath     # previous suspicion for PE/ DVT on admission  - Patient has empirically been treated for PE/DVT since August  - No scans noted to have PE/DVT  - duplex lower ext to without dvt   - ddimer negative    # JACQUES  - likely combination of pre-renal and ATN  - lasix on hold  - will pre-hydrate before and after cath  - nephrology consulted  - trend BMP    #Chronic pain  - continue home Pain meds of Oxycodone 60 mg q12h and Oxycodone 5mg q6h PRN    # HLD  - Atorvastatin for rosuvastatin    # COPD  - tapering steroids   - Symbicort, Spiriva, albuterol  - pulm following     #DM  #steroid induced hyperglycemia  - lantus/premeal insulin per endo  - lispro sliding scale    DVT prophylaxis: heparin ggt  Dispo: pending cath and PT eval

## 2023-12-29 NOTE — DISCHARGE NOTE PROVIDER - NSDCFUSCHEDAPPT_GEN_ALL_CORE_FT
Sari Long  [facility]  [address]  Scheduled Appointment: 01/30/2024     Sari Long  [facility]  [address]  Scheduled Appointment: 01/30/2024

## 2023-12-29 NOTE — PROGRESS NOTE ADULT - NS ATTEND AMEND GEN_ALL_CORE FT
69 yo female with HTN, HLD, DM2, HFpEF, CKD III, DVT, Uterine CA, COPD on home o2 w admitted with acute on chronic resp failure and acute HFpEF exacerbation, superimposed bacterial Pneumonia in setting of RSV and new onset AFib and worsening GFR. Hyperglycemia slowly improving on lower steroid doses, start tapering down insulin doses to prevent hypoglycemia 67 yo female with HTN, HLD, DM2, HFpEF, CKD III, DVT, Uterine CA, COPD on home o2 w admitted with acute on chronic resp failure and acute HFpEF exacerbation, superimposed bacterial Pneumonia in setting of RSV and new onset AFib and worsening GFR. Hyperglycemia slowly improving on lower steroid doses, start tapering down insulin doses to prevent hypoglycemia

## 2023-12-29 NOTE — DISCHARGE NOTE PROVIDER - NSDCCPCAREPLAN_GEN_ALL_CORE_FT
PRINCIPAL DISCHARGE DIAGNOSIS  Diagnosis: Acute heart failure  Assessment and Plan of Treatment:      PRINCIPAL DISCHARGE DIAGNOSIS  Diagnosis: Acute heart failure  Assessment and Plan of Treatment:       SECONDARY DISCHARGE DIAGNOSES  Diagnosis: CAD (coronary artery disease)  Assessment and Plan of Treatment: Coronary artery disease is a condition where the arteries the supply the heart muscle get clogges with fatty deposits & puts you at risk for a heart attack  Call your doctor if you have any new pain, pressure, or discomfort in the center of your chest, pain, tingling or discomfort in arms, back, neck, jaw, or stomach, shortness of breath, nausea, vomiting, burping or heartburn, sweating, cold and clammy skin, racing or abnormal heartbeat for more than 10 minutes or if they keep coming & going.  Call 911 and do not tr to get to hospital by care  You can help yourself with lefestyle changes (quitting smoking if you smoke), eat lots of fruits & vegetables & low fat dairy products, not a lot of meat & fatty foods, walk or some form of physical activity most days of the week, lose weight if you are overweight  Take your cardiac medication as prescribed to lower cholesterol, to lower blood pressure, aspirin to prevent blood clots, and diabetes control  Make sure to keep appointments with doctor for cardiac follow up care      Diagnosis: Pulmonary hypertension  Assessment and Plan of Treatment:     Diagnosis: Atrial fibrillation  Assessment and Plan of Treatment:

## 2023-12-29 NOTE — DISCHARGE NOTE PROVIDER - DETAILS OF MALNUTRITION DIAGNOSIS/DIAGNOSES
This patient has been assessed with a concern for Malnutrition and was treated during this hospitalization for the following Nutrition diagnosis/diagnoses:     -  12/28/2023: Severe protein-calorie malnutrition   -  12/28/2023: Morbid obesity (BMI > 40)   -  12/18/2023: Morbid obesity (BMI > 40)

## 2023-12-29 NOTE — DISCHARGE NOTE PROVIDER - HOSPITAL COURSE
69 yo female with HTN, HLD, DM2, HFpEF, CKD III, DVT, Uterine CA, COPD on home o2 who presented with complaints of shortness of breath. Patient reports that she has been sick all week and was recently started on Vantin and steroids while at the nursing home. Pt admitted for acute on chronic respiratory failure sec to Acute HFpEF exacerbation with likely with Superimposed bacterial Pneumonia in setting of RSV, Severe pulm HTN, new Afib..CT scan which also showed pneumonia and pulmonary HTN. S/P [facility] 12/29 with mild LAD disease,pulm htn. Pt was wean off from BIPAP,currenty on 4L NC. and was given IV lasix, changed to torsemide. Started on heparin drip and changed to Xarelto after cath.pt is seen by cardiology,pulmonary team. f/u cardiology/pulmonary out patient.    New afib RVR  - hr stable  -  loprssor 150mg q12hr  - Xarelto 15 mg qd per renal function  - EP consulted, no DCCV given resp issues  - F/U EP rec  - TTE W or WO Ultrasound Enhancing Agent (12.18.23)EF 60 to 65%. There is moderate (grade 2) left ventricular diastolic dysfunction, with normal filling pressure.      # Acute on chronic respiratory failure  # Acute HFpEF exacerbation   # Superimposed bacterial Pneumonia in setting of RSV  # Severe pulm HTN  - so2 4 L.home 4 L NC  - CT chest reads worsening middle lobe PNA  - S/p [facility] mild LAD disease/ Severe pulmonary HTN 70 / LVEDP stable  - sputum clx ordered for last several days, pt unable to bring up sputum  - repeat MRSA PCR negative  - ID consulted, WBC likely reactive to steroids  - antibiotics stopped on 12/28, WBC trended down  - pt high risk for recurrent aspiration  - repeat blood clx NGTD  - S/P  steroids   - levalbuterol and ipratropium q6hrs, symbicort BID   - Torsemide  - BIPAP qhs  - f/u pulm rec  -F/U Cardio rec    # previous suspicion for PE/ DVT on admission  - Patient has empirically been treated for PE/DVT since August  - No scans noted to have PE/DVT  - duplex lower ext to without dvt   - d dimer negative    # JACQUES- improving  - likely combination of pre-renal and ATN  - lasix on hold  - will pre-hydrate before and after cath  - nephrology consulted  - trend BMP    #Chronic pain  - continue home Pain meds of Oxycodone 60 mg q12h and Oxycodone 5mg q6h PRN    # HLD  - Atorvastatin for rosuvastatin    # COPD  -oxygen supplement  - taper off  steroids   - Symbicort, Spiriva, albuterol  - pulm following     #DM  #steroid induced hyperglycemia  -High BG  -A1C 8.3  - lantus 40 u bid  -increase 38 u meal coverage  -lispro sliding scale  -f/u endocrine rec 69 yo female with HTN, HLD, DM2, HFpEF, CKD III, DVT, Uterine CA, COPD on home o2 who presented with complaints of shortness of breath. Patient reports that she has been sick all week and was recently started on Vantin and steroids while at the nursing home. Pt admitted for acute on chronic respiratory failure sec to Acute HFpEF exacerbation with likely with Superimposed bacterial Pneumonia in setting of RSV, Severe pulm HTN, new Afib..CT scan which also showed pneumonia and pulmonary HTN. S/P [facility] 12/29 with mild LAD disease,pulm htn. Pt was wean off from BIPAP,currenty on 4L NC. and was given IV lasix, changed to torsemide. Started on heparin drip and changed to Xarelto after cath.pt is seen by cardiology,pulmonary team. f/u cardiology/pulmonary out patient.    New afib RVR  - hr stable  -  loprssor 150mg q12hr  - Xarelto 15 mg qd per renal function  - EP consulted, no DCCV given resp issues  - F/U EP rec  - TTE W or WO Ultrasound Enhancing Agent (12.18.23)EF 60 to 65%. There is moderate (grade 2) left ventricular diastolic dysfunction, with normal filling pressure.      # Acute on chronic respiratory failure  # Acute HFpEF exacerbation   # Superimposed bacterial Pneumonia in setting of RSV  # Severe pulm HTN  - so2 4 L.home 4 L NC  - CT chest reads worsening middle lobe PNA  - S/p [facility] mild LAD disease/ Severe pulmonary HTN 70 / LVEDP stable  - sputum clx ordered for last several days, pt unable to bring up sputum  - repeat MRSA PCR negative  - ID consulted, WBC likely reactive to steroids  - antibiotics stopped on 12/28, WBC trended down  - pt high risk for recurrent aspiration  - repeat blood clx NGTD  - S/P  steroids   - levalbuterol and ipratropium q6hrs, symbicort BID   - Torsemide  - BIPAP qhs  - f/u pulm rec  -F/U Cardio rec    # previous suspicion for PE/ DVT on admission  - Patient has empirically been treated for PE/DVT since August  - No scans noted to have PE/DVT  - duplex lower ext to without dvt   - d dimer negative    # JACQUES- improving  - likely combination of pre-renal and ATN  - lasix on hold  - will pre-hydrate before and after cath  - nephrology consulted  - trend BMP    #Chronic pain  - continue home Pain meds of Oxycodone 60 mg q12h and Oxycodone 5mg q6h PRN    # HLD  - Atorvastatin for rosuvastatin    # COPD  -oxygen supplement  - taper off  steroids   - Symbicort, Spiriva, albuterol  - pulm following     #DM  #steroid induced hyperglycemia  -High BG  -A1C 8.3  - lantus 40 u bid  -increase 38 u meal coverage  -lispro sliding scale  -f/u endocrine rec 67 yo female with HTN, HLD, DM2, HFpEF, CKD III, DVT, Uterine CA, COPD on home o2,previous suspicion for PE/ DVT,Patient has empirically been treated for PE/DVT since August who presented with complaints of shortness of breath. Patient reports that she has been sick all week and was recently started on Vantin and steroids while at the nursing home. Pt admitted for acute on chronic respiratory failure sec to Acute HFpEF exacerbation with likely with Superimposed bacterial Pneumonia in setting of RSV, Severe pulm HTN,JACQUES, new Afib..CT scan which also showed pneumonia and pulmonary HTN. S/P [facility] 12/29 with mild LAD disease,pulm htn. WBC likely reactive to steroids, antibiotics stopped on 12/28, WBC trended down.repeat MRSA PCR negative, repeat blood clx NGTD.Pt was seen by ID,pulmonary team.  Pt was wean off from BIPAP,currenty on 4L NC. and was given IV lasix, changed to torsemide. Started on heparin drip and changed to Xarelto after cath. pt is seen by cardiology,pulmonary team. f/u cardiology/pulmonary out patient.Cpap at night. On chronic narcotic for chronic pain. a1c A1C 8.3, lantus 40 u bid,increase 38 u meal coverage.seen by endocrine. Pt is atble to discharge [facility].                 69 yo female with HTN, HLD, DM2, HFpEF, CKD III, DVT, Uterine CA, COPD on home o2,previous suspicion for PE/ DVT,Patient has empirically been treated for PE/DVT since August who presented with complaints of shortness of breath. Patient reports that she has been sick all week and was recently started on Vantin and steroids while at the nursing home. Pt admitted for acute on chronic respiratory failure sec to Acute HFpEF exacerbation with likely with Superimposed bacterial Pneumonia in setting of RSV, Severe pulm HTN,JACQUES, new Afib..CT scan which also showed pneumonia and pulmonary HTN. S/P [facility] 12/29 with mild LAD disease,pulm htn. WBC likely reactive to steroids, antibiotics stopped on 12/28, WBC trended down.repeat MRSA PCR negative, repeat blood clx NGTD.Pt was seen by ID,pulmonary team.  Pt was wean off from BIPAP,currenty on 4L NC. and was given IV lasix, changed to torsemide. Started on heparin drip and changed to Xarelto after cath. pt is seen by cardiology,pulmonary team. f/u cardiology/pulmonary out patient.Cpap at night. On chronic narcotic for chronic pain. a1c A1C 8.3, lantus 40 u bid,increase 38 u meal coverage.seen by endocrine. Pt is atble to discharge [facility].                 68y/oF PMH HTN, HLD, DM2, HFpEF, CKD3, DVT, Uterine CA, COPD on home O2 presenting with SOB from SNF. Had been started on vantin and steroids prior to admission. Pt admitted with acute on chronic respiratory failure 2/2 acute HFpEF exacerbation w/likely superimposed bacterial pna in setting of RSV, severe pHTN, new afib. CT imaging also with pna. now s/p [facility] 12/29 with mild LAD disease, pHTN. Pt weaned off bipap to NC. s/p heparin gtt, on xarelto. pt to dc to [facility]          dc t 68y/oF PMH HTN, HLD, DM2, HFpEF, CKD3, DVT, Uterine CA, COPD on home O2 presenting with SOB from SNF. Had been started on vantin and steroids prior to admission. Pt admitted with acute on chronic respiratory failure 2/2 acute HFpEF exacerbation w/likely superimposed bacterial pna in setting of RSV, severe pHTN, new afib. CT imaging also with pna. now s/p [facility] 12/29 with mild LAD disease, pHTN. Pt weaned off bipap to NC. s/p heparin gtt, on xarelto. pt to dc to [facility]          dc t 68y/oF PMH HTN, HLD, DM2, HFpEF, CKD3, DVT, Uterine CA, COPD on home O2 presenting with SOB from SNF. Had been started on vantin and steroids prior to admission. Pt admitted with acute on chronic respiratory failure 2/2 acute HFpEF exacerbation w/likely superimposed bacterial pna in setting of RSV, severe pHTN, new afib. CT imaging also with pna. now s/p [facility] 12/29 with mild LAD disease, pHTN. Pt weaned off bipap to NC. s/p heparin gtt, on xarelto. respiratory status stable on NC. recommendations appreciated from cardiology, pulmonology, nephrology, endocrinology.  pt to dc to [facility]

## 2023-12-29 NOTE — DISCHARGE NOTE PROVIDER - NSDCCPTREATMENT_GEN_ALL_CORE_FT
PRINCIPAL PROCEDURE  Procedure: Left heart catheterization  Findings and Treatment: Restricted use with no heavy lifting of affected arm for 48 hours.  No submerging the arm in water for 48 hours.  You may start showering today.  Call your doctor for any bleeding, swelling, loss of sensation in the hand or fingers, or fingers turning blue.  If heavy bleeding or large lumps form, hold pressure at the spot and come to the Emergency Room.        SECONDARY PROCEDURE  Procedure: Right heart catheterization  Findings and Treatment: Restricted use with no heavy lifting of affected arm for 48 hours.  No submerging the arm in water for 48 hours.  You may start showering today.  Call your doctor for any bleeding, swelling, loss of sensation in the hand or fingers, or fingers turning blue.  If heavy bleeding or large lumps form, hold pressure at the spot and come to the Emergency Room.

## 2023-12-29 NOTE — PROGRESS NOTE ADULT - ASSESSMENT
67 y/o female with hx of HFpEF (EF 55-60% 8/2023), on home O2 4L, pulmonary HTN, morbid obesity, asthma, CKD3, IDDM2, uterine cancer s/p MEGAN, DVT LLE (2004), chronic leg edema 2/2 venous stasis, spinal stenosis with chronic back pain who presents with midsternal chest pressure and shortness of breath. Patient has had multiple hospitalizations since September 2023. She had hospitalization 8/30-9/13/23 for acute on chronic decompensated HFpEF with possible presumed PE and severe pulmonary hypertension (PASP of 70 mmHg). During that admission, she was profoundly volume overloaded requiring IV diuresis; elevated DDimer 407; US lower extremity edema: Markedly limited visualization. Unable to perform compression. No evidence of deep venous thrombosis in either lower extremity. +trop .09-> .08-> .06. TTE during that time: LVEF 55-60%; moderate RVE and moderately reduced RVSF; PASP 70.9mmHg. Due to patient's weight and renal function, ischemic testing cath/ NST was precluded. Patient was ultimately discharged home on oral diuresis and eliquis 5mg PO BID.   Patient had admission to [facility] 9/18-9/29/23 for complaints of rectal pain; constipation and no reported BM x 2 weeks. CT scan consistent for  constipation Rectal pain may also be d/t anal fissure vs thrombosed hemorrhoids, started on bowel regimen and nitroglycerin  ointment rectally BID for possible fissure vs thrombosed hemorrhoids. Colorectal surgery consulted & rec outpatient follow up. During her stay, she had an acute UTI treated with Ceftriaxone x 3 days. Patient was recommended to being discharged to [facility] but refused was ultimately discharged home on 9/29/23.   Patient had readmission back to [facility] on 9/29/23 for b/l LE weakness, legs giving out while walking and almost falling. She was admitted for [facility] placement and ultimately discharged on 10/4/23.   he presented to [facility] 11/24/23 for severe rectal pain with defecation evaluated by colorectal surgery in which exam was consistent with anal fissure. CT A/P w/o acute pathology; no acute surgical intervention; SITZ bath TID, Miralax/Senna, Lidocaine Cr post BM, Nitroglycerin ointment BID. Pt also with new wound anterior right ankle area with no signs of infection and no signs of cellulitis.  Seen by podiatry, Rx betadine with gauze and yasir right ankle. Patient was ultimately discharged home on 12/1.   On  12/16/23 patient presents from [facility]. She states she had multiple episodes of non radiating mid chest pressure and shortness of breath at rest.    Patient reports that her dyspnea just worsened and she told the nursing home to send her in for an evaluation. While in the ED, patient was placed on BIPAP and was given IV lasix with some improvement. RVP then results as RSV +. Patient had a CT scan which also showed pneumonia and pulmonary HTN. hospital course C/B New Afib,  on lopressor 75mg po q8 hourly and lopressor 5mg ivp PRN, echo w/ preserved EF, - c/w heparin drip for [id number] 6   pt remains afebrile and resp status improving, weaned off from BIPAP to now on NC , BIPAP HS    CT chest reads worsening middle lobe PNA, WBC likely reactive to steroids, S/P Abx, - repeat blood clx NGTD  Patient now presents to [facility] CCL for Right nad left heart cath to assess her right heart pressures and to evaluate Coronary anatomy with possible intervention in setting of CP, HFPef with SOB      Risk Assessments:  ASA: 3  Mallampati: 3  Creat:1.76  GFR: 34  BRA: 2.2%      Indication: CHF, ischemic symptoms of CP, SOB    Coronary Anatomy: UNKNOWN, No prior cath    Plan:    -plan for R/LHC via RRA/RGV/ RFA/RFV  -confirmed appropriate NPO duration  -ECG and Labs reviewed  -Creatinine 1.6, pt CKD, RENAL CLEARED THE PT FOR CATH,, started on mucomyst [id number]  x4 doses, need to closely monitor Cr post cath  -Aspirin 81mg po pre-cath  -NS 0.9% 250ml/hr x 1 bolus; pre procedure KIERRA ppx held in setting of CHF  -procedure discussed with patient; risks and benefits explained, questions answered  -consent obtained by attending dr Dang                     69 y/o female with hx of HFpEF (EF 55-60% 8/2023), on home O2 4L, pulmonary HTN, morbid obesity, asthma, CKD3, IDDM2, uterine cancer s/p MEGAN, DVT LLE (2004), chronic leg edema 2/2 venous stasis, spinal stenosis with chronic back pain who presents with midsternal chest pressure and shortness of breath. Patient has had multiple hospitalizations since September 2023. She had hospitalization 8/30-9/13/23 for acute on chronic decompensated HFpEF with possible presumed PE and severe pulmonary hypertension (PASP of 70 mmHg). During that admission, she was profoundly volume overloaded requiring IV diuresis; elevated DDimer 407; US lower extremity edema: Markedly limited visualization. Unable to perform compression. No evidence of deep venous thrombosis in either lower extremity. +trop .09-> .08-> .06. TTE during that time: LVEF 55-60%; moderate RVE and moderately reduced RVSF; PASP 70.9mmHg. Due to patient's weight and renal function, ischemic testing cath/ NST was precluded. Patient was ultimately discharged home on oral diuresis and eliquis 5mg PO BID.   Patient had admission to [facility] 9/18-9/29/23 for complaints of rectal pain; constipation and no reported BM x 2 weeks. CT scan consistent for  constipation Rectal pain may also be d/t anal fissure vs thrombosed hemorrhoids, started on bowel regimen and nitroglycerin  ointment rectally BID for possible fissure vs thrombosed hemorrhoids. Colorectal surgery consulted & rec outpatient follow up. During her stay, she had an acute UTI treated with Ceftriaxone x 3 days. Patient was recommended to being discharged to [facility] but refused was ultimately discharged home on 9/29/23.   Patient had readmission back to [facility] on 9/29/23 for b/l LE weakness, legs giving out while walking and almost falling. She was admitted for [facility] placement and ultimately discharged on 10/4/23.   he presented to [facility] 11/24/23 for severe rectal pain with defecation evaluated by colorectal surgery in which exam was consistent with anal fissure. CT A/P w/o acute pathology; no acute surgical intervention; SITZ bath TID, Miralax/Senna, Lidocaine Cr post BM, Nitroglycerin ointment BID. Pt also with new wound anterior right ankle area with no signs of infection and no signs of cellulitis.  Seen by podiatry, Rx betadine with gauze and yasir right ankle. Patient was ultimately discharged home on 12/1.   On  12/16/23 patient presents from [facility]. She states she had multiple episodes of non radiating mid chest pressure and shortness of breath at rest.    Patient reports that her dyspnea just worsened and she told the nursing home to send her in for an evaluation. While in the ED, patient was placed on BIPAP and was given IV lasix with some improvement. RVP then results as RSV +. Patient had a CT scan which also showed pneumonia and pulmonary HTN. hospital course C/B New Afib,  on lopressor 75mg po q8 hourly and lopressor 5mg ivp PRN, echo w/ preserved EF, - c/w heparin drip for [id number] 6   pt remains afebrile and resp status improving, weaned off from BIPAP to now on NC , BIPAP HS    CT chest reads worsening middle lobe PNA, WBC likely reactive to steroids, S/P Abx, - repeat blood clx NGTD  Patient now presents to [facility] CCL for Right nad left heart cath to assess her right heart pressures and to evaluate Coronary anatomy with possible intervention in setting of CP, HFPef with SOB      Risk Assessments:  ASA: 3  Mallampati: 3  Creat:1.76  GFR: 34  BRA: 2.2%      Indication: CHF, ischemic symptoms of CP, SOB    Coronary Anatomy: UNKNOWN, No prior cath    Plan:    -plan for R/LHC via RRA/RGV/ RFA/RFV  -confirmed appropriate NPO duration  -ECG and Labs reviewed  -Creatinine 1.6, pt CKD, RENAL CLEARED THE PT FOR CATH,, started on mucomyst [id number]  x4 doses, need to closely monitor Cr post cath  -Aspirin 81mg po pre-cath  -NS 0.9% 250ml/hr x 1 bolus; pre procedure KIERRA ppx held in setting of CHF  -procedure discussed with patient; risks and benefits explained, questions answered  -consent obtained by attending dr Dang                     67 y/o female with hx of HFpEF (EF 55-60% 8/2023), on home O2 4L, pulmonary HTN, morbid obesity, asthma, CKD3, IDDM2, uterine cancer s/p MEGAN, DVT LLE (2004), chronic leg edema 2/2 venous stasis, spinal stenosis with chronic back pain who presents with midsternal chest pressure and shortness of breath. Patient has had multiple hospitalizations since September 2023. She had hospitalization 8/30-9/13/23 for acute on chronic decompensated HFpEF with possible presumed PE and severe pulmonary hypertension (PASP of 70 mmHg). During that admission, she was profoundly volume overloaded requiring IV diuresis; elevated DDimer 407; US lower extremity edema: Markedly limited visualization. Unable to perform compression. No evidence of deep venous thrombosis in either lower extremity. +trop .09-> .08-> .06. TTE during that time: LVEF 55-60%; moderate RVE and moderately reduced RVSF; PASP 70.9mmHg. Due to patient's weight and renal function, ischemic testing cath/ NST was precluded. Patient was ultimately discharged home on oral diuresis and eliquis 5mg PO BID.   Patient had admission to [facility] 9/18-9/29/23 for complaints of rectal pain; constipation and no reported BM x 2 weeks. CT scan consistent for  constipation Rectal pain may also be d/t anal fissure vs thrombosed hemorrhoids, started on bowel regimen and nitroglycerin  ointment rectally BID for possible fissure vs thrombosed hemorrhoids. Colorectal surgery consulted & rec outpatient follow up. During her stay, she had an acute UTI treated with Ceftriaxone x 3 days. Patient was recommended to being discharged to [facility] but refused was ultimately discharged home on 9/29/23.   Patient had readmission back to [facility] on 9/29/23 for b/l LE weakness, legs giving out while walking and almost falling. She was admitted for [facility] placement and ultimately discharged on 10/4/23.   he presented to [facility] 11/24/23 for severe rectal pain with defecation evaluated by colorectal surgery in which exam was consistent with anal fissure. CT A/P w/o acute pathology; no acute surgical intervention; SITZ bath TID, Miralax/Senna, Lidocaine Cr post BM, Nitroglycerin ointment BID. Pt also with new wound anterior right ankle area with no signs of infection and no signs of cellulitis.  Seen by podiatry, Rx betadine with gauze and yasir right ankle. Patient was ultimately discharged home on 12/1.   On  12/16/23 patient presents from [facility]. She states she had multiple episodes of non radiating mid chest pressure and shortness of breath at rest.    Patient reports that her dyspnea just worsened and she told the nursing home to send her in for an evaluation. While in the ED, patient was placed on BIPAP and was given IV lasix with some improvement. RVP then results as RSV +. Patient had a CT scan which also showed pneumonia and pulmonary HTN. hospital course C/B New Afib,  on lopressor 75mg po q8 hourly and lopressor 5mg ivp PRN, echo w/ preserved EF, - c/w heparin drip for [id number] 6   pt remains afebrile and resp status improving, weaned off from BIPAP to now on NC , BIPAP HS    CT chest reads worsening middle lobe PNA, WBC likely reactive to steroids, S/P Abx, - repeat blood clx NGTD  Patient now presents to [facility] CCL for Right nad left heart cath to assess her right heart pressures and to evaluate Coronary anatomy with possible intervention in setting of CP, HFPef with SOB      Risk Assessments:  ASA: 3  Mallampati: 3  Creat:1.76  GFR: 34  BRA: 2.2%      Indication: CHF, ischemic symptoms of CP, SOB    Coronary Anatomy: UNKNOWN, No prior cath    Patient arrived in RR, AFIB WITH RVR ON TELE, hR 'S PATIENTA SYMPTOMATIC, NOT HYPOXIC, O2SAT 94% ON 4LO2NC  Metoprolol 5mg ivp x1 dose followed by metoprolol 2.5mg iv  2nd dose ordered, received first dose, HR in 100's to 110's after metoprolol 5mg ivp  C/W metoprolol po and IV PRN  dr Dang aware     Plan:    -plan for R/LHC via RRA/RGV/ RFA/RFV  -confirmed appropriate NPO duration  -ECG and Labs reviewed  -Creatinine 1.6, pt CKD, RENAL CLEARED THE PT FOR CATH,, started on mucomyst [id number]  x4 doses, need to closely monitor Cr post cath  -Aspirin 81mg po pre-cath  -NS 0.9% 250ml/hr x 1 bolus; pre procedure KIERRA ppx held in setting of CHF  -procedure discussed with patient; risks and benefits explained, questions answered  -consent obtained by attending dr Dang                     67 y/o female with hx of HFpEF (EF 55-60% 8/2023), on home O2 4L, pulmonary HTN, morbid obesity, asthma, CKD3, IDDM2, uterine cancer s/p MEGAN, DVT LLE (2004), chronic leg edema 2/2 venous stasis, spinal stenosis with chronic back pain who presents with midsternal chest pressure and shortness of breath. Patient has had multiple hospitalizations since September 2023. She had hospitalization 8/30-9/13/23 for acute on chronic decompensated HFpEF with possible presumed PE and severe pulmonary hypertension (PASP of 70 mmHg). During that admission, she was profoundly volume overloaded requiring IV diuresis; elevated DDimer 407; US lower extremity edema: Markedly limited visualization. Unable to perform compression. No evidence of deep venous thrombosis in either lower extremity. +trop .09-> .08-> .06. TTE during that time: LVEF 55-60%; moderate RVE and moderately reduced RVSF; PASP 70.9mmHg. Due to patient's weight and renal function, ischemic testing cath/ NST was precluded. Patient was ultimately discharged home on oral diuresis and eliquis 5mg PO BID.   Patient had admission to [facility] 9/18-9/29/23 for complaints of rectal pain; constipation and no reported BM x 2 weeks. CT scan consistent for  constipation Rectal pain may also be d/t anal fissure vs thrombosed hemorrhoids, started on bowel regimen and nitroglycerin  ointment rectally BID for possible fissure vs thrombosed hemorrhoids. Colorectal surgery consulted & rec outpatient follow up. During her stay, she had an acute UTI treated with Ceftriaxone x 3 days. Patient was recommended to being discharged to [facility] but refused was ultimately discharged home on 9/29/23.   Patient had readmission back to [facility] on 9/29/23 for b/l LE weakness, legs giving out while walking and almost falling. She was admitted for [facility] placement and ultimately discharged on 10/4/23.   he presented to [facility] 11/24/23 for severe rectal pain with defecation evaluated by colorectal surgery in which exam was consistent with anal fissure. CT A/P w/o acute pathology; no acute surgical intervention; SITZ bath TID, Miralax/Senna, Lidocaine Cr post BM, Nitroglycerin ointment BID. Pt also with new wound anterior right ankle area with no signs of infection and no signs of cellulitis.  Seen by podiatry, Rx betadine with gauze and yasir right ankle. Patient was ultimately discharged home on 12/1.   On  12/16/23 patient presents from [facility]. She states she had multiple episodes of non radiating mid chest pressure and shortness of breath at rest.    Patient reports that her dyspnea just worsened and she told the nursing home to send her in for an evaluation. While in the ED, patient was placed on BIPAP and was given IV lasix with some improvement. RVP then results as RSV +. Patient had a CT scan which also showed pneumonia and pulmonary HTN. hospital course C/B New Afib,  on lopressor 75mg po q8 hourly and lopressor 5mg ivp PRN, echo w/ preserved EF, - c/w heparin drip for [id number] 6   pt remains afebrile and resp status improving, weaned off from BIPAP to now on NC , BIPAP HS    CT chest reads worsening middle lobe PNA, WBC likely reactive to steroids, S/P Abx, - repeat blood clx NGTD  Patient now presents to [facility] CCL for Right nad left heart cath to assess her right heart pressures and to evaluate Coronary anatomy with possible intervention in setting of CP, HFPef with SOB      Risk Assessments:  ASA: 3  Mallampati: 3  Creat:1.76  GFR: 34  BRA: 2.2%      Indication: CHF, ischemic symptoms of CP, SOB    Coronary Anatomy: UNKNOWN, No prior cath    Patient arrived in RR, AFIB WITH RVR ON TELE, hR 'S PATIENTA SYMPTOMATIC, NOT HYPOXIC, O2SAT 94% ON 4LO2NC  Metoprolol 5mg ivp x1 dose followed by metoprolol 2.5mg iv  2nd dose ordered, received first dose, HR in 100's to 110's after metoprolol 5mg ivp  C/W metoprolol po and IV PRN  dr Dang aware     Plan:    -plan for R/LHC via RRA/RGV/ RFA/RFV  -confirmed appropriate NPO duration  -ECG and Labs reviewed  -Creatinine 1.6, pt CKD, RENAL CLEARED THE PT FOR CATH,, started on mucomyst [id number]  x4 doses, need to closely monitor Cr post cath  -Aspirin 81mg po pre-cath  -NS 0.9% 250ml/hr x 1 bolus; pre procedure KIERRA ppx held in setting of CHF  -procedure discussed with patient; risks and benefits explained, questions answered  -consent obtained by attending dr Dang

## 2023-12-29 NOTE — DISCHARGE NOTE PROVIDER - NSDCMRMEDTOKEN_GEN_ALL_CORE_FT
ADVAIR -21 MCG INHALER: TAKE 2 PUFFS BY MOUTH TWICE A DAY  apixaban 5 mg oral tablet: 1 tab(s) orally every 12 hours  diphenhydrAMINE 25 mg oral tablet: 1 tab(s) orally 3 times a day as needed for  rash  furosemide 40 mg oral tablet: 1 tab(s) orally once a day  HumaLOG 100 units/mL injectable solution: 20 international unit(s) injectable twice a day after breakfast and dinner  ipratropium-albuterol 0.5 mg-2.5 mg/3 mL inhalation solution: 3 milliliter(s) by nebulizer 4 times a day  lactulose 20 g oral powder for reconstitution: 1 each orally once a day  Lantus 100 units/mL subcutaneous solution: 35 unit(s) subcutaneous once a day (at bedtime)  MiraLax oral powder for reconstitution: 17 gram(s) orally once a day  Nupercainal 1% topical ointment: Apply topically to affected area 3 times a day  nystatin 100,000 units/g topical powder: 1 Apply topically to affected area 3 times a day  oxyCODONE 5 mg oral tablet: 1 tab(s) orally every 6 hours As needed Moderate Pain (4 - 6)  oxyCODONE 60 mg oral tablet, extended release: 1 tab(s) orally 2 times a day  ProAir HFA 90 mcg/inh inhalation aerosol: 2 puff(s) inhaled  rosuvastatin 20 mg oral capsule: 1 cap(s) orally once a day (at bedtime)  senna leaf extract oral tablet: 2 tab(s) orally once a day (at bedtime)  Xanax 0.25 mg oral tablet: 1 tab(s) orally once a day (at bedtime) as needed for  anxiety   acetaminophen 325 mg oral tablet: 2 tab(s) orally every 6 hours As needed Temp greater or equal to 38C (100.4F), Mild Pain (1 - 3)  acetylcysteine 20% inhalation solution: 4 milliliter(s) inhaled once a day  ALPRAZolam 0.25 mg oral tablet: 1 tab(s) orally every 8 hours As needed for anxiety/Sleep  atorvastatin 80 mg oral tablet: 1 tab(s) orally once a day (at bedtime)  budesonide-formoterol 80 mcg-4.5 mcg/inh inhalation aerosol: 2 puff(s) inhaled 2 times a day  clotrimazole 1% topical cream: 1 Apply topically to affected area 2 times a day  diphenhydrAMINE 25 mg oral tablet: 1 tab(s) orally 3 times a day as needed for  rash  famotidine 20 mg oral tablet: 1 tab(s) orally once a day  insulin glargine 100 units/mL subcutaneous solution: 40 unit(s) subcutaneous 2 times a day  insulin lispro 100 units/mL injectable solution: 37 unit(s) subcutaneous 3 times a day 15 mins before meal  ipratropium 500 mcg/2.5 mL inhalation solution: 2.5 milliliter(s) inhaled every 6 hours  lactulose 10 g/15 mL oral syrup: 30 milliliter(s) orally once a day  levalbuterol 0.63 mg/3 mL inhalation solution: 3 milliliter(s) inhaled every 6 hours  melatonin 3 mg oral tablet: 1 tab(s) orally once a day (at bedtime) As needed Insomnia  metoprolol tartrate 75 mg oral tablet: 2 tab(s) orally 2 times a day  nystatin 100,000 units/g topical powder: 1 Apply topically to affected area 2 times a day  oxyCODONE 5 mg oral tablet: 1 tab(s) orally every 6 hours As needed Moderate Pain (4 - 6)  oxyCODONE 60 mg oral tablet, extended release: 1 tab(s) orally 2 times a day  polyethylene glycol 3350 oral powder for reconstitution: 17 gram(s) orally once a day  rivaroxaban 15 mg oral tablet: 1 tab(s) orally once a day  senna leaf extract oral tablet: 2 tab(s) orally once a day (at bedtime)   acetaminophen 325 mg oral tablet: 2 tab(s) orally every 6 hours As needed Temp greater or equal to 38C (100.4F), Mild Pain (1 - 3)  acetylcysteine 20% inhalation solution: 4 milliliter(s) inhaled once a day  ALPRAZolam 0.25 mg oral tablet: 1 tab(s) orally every 8 hours As needed for anxiety/Sleep  atorvastatin 80 mg oral tablet: 1 tab(s) orally once a day (at bedtime)  budesonide-formoterol 80 mcg-4.5 mcg/inh inhalation aerosol: 2 puff(s) inhaled 2 times a day  clotrimazole 1% topical cream: 1 Apply topically to affected area 2 times a day  diphenhydrAMINE 25 mg oral tablet: 1 tab(s) orally 3 times a day as needed for  rash  famotidine 20 mg oral tablet: 1 tab(s) orally once a day  insulin glargine 100 units/mL subcutaneous solution: 30 unit(s) subcutaneous 2 times a day  insulin lispro 100 units/mL injectable solution: 25 unit(s) subcutaneous 3 times a day 15 mins before meal  ipratropium 500 mcg/2.5 mL inhalation solution: 2.5 milliliter(s) inhaled every 6 hours  lactulose 10 g/15 mL oral syrup: 30 milliliter(s) orally once a day  levalbuterol 0.63 mg/3 mL inhalation solution: 3 milliliter(s) inhaled every 6 hours  melatonin 3 mg oral tablet: 1 tab(s) orally once a day (at bedtime) As needed Insomnia  metoprolol tartrate 75 mg oral tablet: 1 tab(s) orally every 8 hours hold if sbp&lt;100,hr&lt;55  nystatin 100,000 units/g topical powder: 1 Apply topically to affected area 2 times a day  oxyCODONE 5 mg oral tablet: 1 tab(s) orally every 6 hours As needed Moderate Pain (4 - 6)  oxyCODONE 60 mg oral tablet, extended release: 0.5 tab(s) orally 2 times a day hold if pt is lathergic  polyethylene glycol 3350 oral powder for reconstitution: 17 gram(s) orally once a day  rivaroxaban 15 mg oral tablet: 1 tab(s) orally once a day  senna leaf extract oral tablet: 2 tab(s) orally once a day (at bedtime)   acetaminophen 325 mg oral tablet: 2 tab(s) orally every 6 hours As needed Temp greater or equal to 38C (100.4F), Mild Pain (1 - 3)  ALPRAZolam 0.25 mg oral tablet: 1 tab(s) orally every 8 hours As needed for anxiety/Sleep  atorvastatin 80 mg oral tablet: 1 tab(s) orally once a day (at bedtime)  bisacodyl 10 mg rectal suppository: 1 suppository(ies) rectal once a day As needed Constipation  budesonide-formoterol 80 mcg-4.5 mcg/inh inhalation aerosol: 2 puff(s) inhaled 2 times a day  clotrimazole 1% topical cream: 1 Apply topically to affected area 2 times a day  diphenhydrAMINE 25 mg oral tablet: 1 tab(s) orally 3 times a day as needed for  rash  famotidine 20 mg oral tablet: 1 tab(s) orally once a day  insulin glargine 100 units/mL subcutaneous solution: 34 unit(s) subcutaneous 2 times a day  insulin lispro 100 units/mL injectable solution: 36 unit(s) subcutaneous 3 times a day (with meals)  ipratropium 500 mcg/2.5 mL inhalation solution: 2.5 milliliter(s) inhaled every 6 hours  lactulose 10 g/15 mL oral syrup: 30 milliliter(s) orally once a day  levalbuterol 0.63 mg/3 mL inhalation solution: 3 milliliter(s) inhaled every 6 hours  lidocaine 4% topical film: Apply topically to affected area once a day  melatonin 3 mg oral tablet: 1 tab(s) orally once a day (at bedtime) As needed Insomnia  metoprolol tartrate 75 mg oral tablet: 1 tab(s) orally every 8 hours hold if sbp&lt;100,hr&lt;55  Multiple Vitamins oral tablet: 1 tab(s) orally once a day  nystatin 100,000 units/g topical powder: 1 Apply topically to affected area 2 times a day  oxyCODONE 5 mg oral tablet: 1 tab(s) orally every 6 hours As needed Moderate Pain (4 - 6)  oxyCODONE 60 mg oral tablet, extended release: 0.5 tab(s) orally 2 times a day hold if pt is lathergic  polyethylene glycol 3350 oral powder for reconstitution: 17 gram(s) orally once a day  rivaroxaban 15 mg oral tablet: 1 tab(s) orally once a day  senna leaf extract oral tablet: 2 tab(s) orally once a day (at bedtime)  torsemide 20 mg oral tablet: 1 tab(s) orally once a day

## 2023-12-29 NOTE — PROGRESS NOTE ADULT - SUBJECTIVE AND OBJECTIVE BOX
NEPHROLOGY INTERVAL HPI/OVERNIGHT EVENTS:    Examined earlier  No distress  Cath today    MEDICATIONS  (STANDING):  acetylcysteine  Oral Solution 1200 milliGRAM(s) Oral every 12 hours  acetylcysteine 20%  Inhalation 4 milliLiter(s) Inhalation daily  atorvastatin 80 milliGRAM(s) Oral at bedtime  budesonide  80 MICROgram(s)/formoterol 4.5 MICROgram(s) Inhaler 2 Puff(s) Inhalation two times a day  chlorhexidine 2% Cloths 1 Application(s) Topical <User Schedule>  clotrimazole 1% Cream 1 Application(s) Topical two times a day  dextrose 5%. 1000 milliLiter(s) (100 mL/Hr) IV Continuous <Continuous>  dextrose 5%. 1000 milliLiter(s) (50 mL/Hr) IV Continuous <Continuous>  dextrose 50% Injectable 25 Gram(s) IV Push once  dextrose 50% Injectable 12.5 Gram(s) IV Push once  dextrose 50% Injectable 25 Gram(s) IV Push once  famotidine    Tablet 20 milliGRAM(s) Oral daily  glucagon  Injectable 1 milliGRAM(s) IntraMuscular once  heparin  Infusion.  Unit(s)/Hr (24 mL/Hr) IV Continuous <Continuous>  insulin glargine Injectable (LANTUS) 40 Unit(s) SubCutaneous at bedtime  insulin lispro (ADMELOG) corrective regimen sliding scale   SubCutaneous three times a day before meals  insulin lispro Injectable (ADMELOG) 35 Unit(s) SubCutaneous three times a day before meals  ipratropium    for Nebulization 500 MICROGram(s) Nebulizer every 6 hours  lactulose Syrup 20 Gram(s) Oral daily  levalbuterol Inhalation 0.63 milliGRAM(s) Inhalation every 6 hours  levalbuterol Inhalation 0.63 milliGRAM(s) Inhalation once  metoprolol tartrate 75 milliGRAM(s) Oral every 8 hours  metoprolol tartrate Injectable 2.5 milliGRAM(s) IV Push once  nystatin Powder 1 Application(s) Topical two times a day  oxyCODONE  ER Tablet 60 milliGRAM(s) Oral every 12 hours  polyethylene glycol 3350 17 Gram(s) Oral daily  senna 2 Tablet(s) Oral at bedtime  torsemide 20 milliGRAM(s) Oral daily    MEDICATIONS  (PRN):  acetaminophen     Tablet .. 650 milliGRAM(s) Oral every 6 hours PRN Temp greater or equal to 38C (100.4F), Mild Pain (1 - 3)  albuterol    90 MICROgram(s) HFA Inhaler 2 Puff(s) Inhalation every 2 hours PRN Shortness of Breath and/or Wheezing  dextrose Oral Gel 15 Gram(s) Oral once PRN Blood Glucose LESS THAN 70 milliGRAM(s)/deciliter  diphenhydrAMINE 25 milliGRAM(s) Oral every 6 hours PRN Rash and/or Itching  heparin   Injectable 5000 Unit(s) IV Push every 6 hours PRN For aPTT between 40 - 57  heparin   Injectable 00306 Unit(s) IV Push every 6 hours PRN For aPTT less than 40  hydrALAZINE Injectable 10 milliGRAM(s) IV Push every 4 hours PRN for systolic bp > 160  HYDROmorphone  Injectable 1 milliGRAM(s) IV Push every 4 hours PRN breakthrough  pain  melatonin 3 milliGRAM(s) Oral at bedtime PRN Insomnia  metoprolol tartrate Injectable 5 milliGRAM(s) IV Push every 6 hours PRN heart rate sustaining greater than 130  ondansetron Injectable 4 milliGRAM(s) IV Push every 8 hours PRN Nausea and/or Vomiting  oxyCODONE    IR 5 milliGRAM(s) Oral every 6 hours PRN Moderate Pain (4 - 6)      Allergies    wool- rash, itch (Other)  adhesives (Rash)  latex (Rash)  Bactrim (Flushing)    Intolerances        Vital Signs Last 24 Hrs  T(C): 36.6 (29 Dec 2023 14:39), Max: 36.9 (28 Dec 2023 20:59)  T(F): 97.9 (29 Dec 2023 14:39), Max: 98.4 (28 Dec 2023 20:59)  HR: 142 (29 Dec 2023 14:39) (98 - 142)  BP: 114/73 (29 Dec 2023 14:39) (105/69 - 151/92)  BP(mean): --  RR: 20 (29 Dec 2023 14:39) (18 - 20)  SpO2: 95% (29 Dec 2023 14:39) (94% - 97%)    Parameters below as of 29 Dec 2023 14:39  Patient On (Oxygen Delivery Method): nasal cannula  O2 Flow (L/min): 4    PHYSICAL EXAM:  Gen: obese woman  	HEENT: +NC  	Pulm: decreased bs  	CV: RRR, S1S2; no rub  	Back: No spinal or CVA tenderness; no sacral edema  	Abd: +BS, soft, nontender/nondistended  	: No suprapubic tenderness  	LE: Warm, leg pitting edema     LABS:                        13.4   27.52 )-----------( 227      ( 29 Dec 2023 05:52 )             41.3     12-29    136  |  94<L>  |  69.4<H>  ----------------------------<  218<H>  5.0   |  30.0<H>  |  1.60<H>    Ca    9.3      29 Dec 2023 05:52      PTT - ( 29 Dec 2023 05:52 )  PTT:61.6 sec  Urinalysis Basic - ( 29 Dec 2023 05:52 )    Color: x / Appearance: x / SG: x / pH: x  Gluc: 218 mg/dL / Ketone: x  / Bili: x / Urobili: x   Blood: x / Protein: x / Nitrite: x   Leuk Esterase: x / RBC: x / WBC x   Sq Epi: x / Non Sq Epi: x / Bacteria: x              RADIOLOGY & ADDITIONAL TESTS:   NEPHROLOGY INTERVAL HPI/OVERNIGHT EVENTS:    Examined earlier  No distress  Cath today    MEDICATIONS  (STANDING):  acetylcysteine  Oral Solution 1200 milliGRAM(s) Oral every 12 hours  acetylcysteine 20%  Inhalation 4 milliLiter(s) Inhalation daily  atorvastatin 80 milliGRAM(s) Oral at bedtime  budesonide  80 MICROgram(s)/formoterol 4.5 MICROgram(s) Inhaler 2 Puff(s) Inhalation two times a day  chlorhexidine 2% Cloths 1 Application(s) Topical <User Schedule>  clotrimazole 1% Cream 1 Application(s) Topical two times a day  dextrose 5%. 1000 milliLiter(s) (100 mL/Hr) IV Continuous <Continuous>  dextrose 5%. 1000 milliLiter(s) (50 mL/Hr) IV Continuous <Continuous>  dextrose 50% Injectable 25 Gram(s) IV Push once  dextrose 50% Injectable 12.5 Gram(s) IV Push once  dextrose 50% Injectable 25 Gram(s) IV Push once  famotidine    Tablet 20 milliGRAM(s) Oral daily  glucagon  Injectable 1 milliGRAM(s) IntraMuscular once  heparin  Infusion.  Unit(s)/Hr (24 mL/Hr) IV Continuous <Continuous>  insulin glargine Injectable (LANTUS) 40 Unit(s) SubCutaneous at bedtime  insulin lispro (ADMELOG) corrective regimen sliding scale   SubCutaneous three times a day before meals  insulin lispro Injectable (ADMELOG) 35 Unit(s) SubCutaneous three times a day before meals  ipratropium    for Nebulization 500 MICROGram(s) Nebulizer every 6 hours  lactulose Syrup 20 Gram(s) Oral daily  levalbuterol Inhalation 0.63 milliGRAM(s) Inhalation every 6 hours  levalbuterol Inhalation 0.63 milliGRAM(s) Inhalation once  metoprolol tartrate 75 milliGRAM(s) Oral every 8 hours  metoprolol tartrate Injectable 2.5 milliGRAM(s) IV Push once  nystatin Powder 1 Application(s) Topical two times a day  oxyCODONE  ER Tablet 60 milliGRAM(s) Oral every 12 hours  polyethylene glycol 3350 17 Gram(s) Oral daily  senna 2 Tablet(s) Oral at bedtime  torsemide 20 milliGRAM(s) Oral daily    MEDICATIONS  (PRN):  acetaminophen     Tablet .. 650 milliGRAM(s) Oral every 6 hours PRN Temp greater or equal to 38C (100.4F), Mild Pain (1 - 3)  albuterol    90 MICROgram(s) HFA Inhaler 2 Puff(s) Inhalation every 2 hours PRN Shortness of Breath and/or Wheezing  dextrose Oral Gel 15 Gram(s) Oral once PRN Blood Glucose LESS THAN 70 milliGRAM(s)/deciliter  diphenhydrAMINE 25 milliGRAM(s) Oral every 6 hours PRN Rash and/or Itching  heparin   Injectable 5000 Unit(s) IV Push every 6 hours PRN For aPTT between 40 - 57  heparin   Injectable 63421 Unit(s) IV Push every 6 hours PRN For aPTT less than 40  hydrALAZINE Injectable 10 milliGRAM(s) IV Push every 4 hours PRN for systolic bp > 160  HYDROmorphone  Injectable 1 milliGRAM(s) IV Push every 4 hours PRN breakthrough  pain  melatonin 3 milliGRAM(s) Oral at bedtime PRN Insomnia  metoprolol tartrate Injectable 5 milliGRAM(s) IV Push every 6 hours PRN heart rate sustaining greater than 130  ondansetron Injectable 4 milliGRAM(s) IV Push every 8 hours PRN Nausea and/or Vomiting  oxyCODONE    IR 5 milliGRAM(s) Oral every 6 hours PRN Moderate Pain (4 - 6)      Allergies    wool- rash, itch (Other)  adhesives (Rash)  latex (Rash)  Bactrim (Flushing)    Intolerances        Vital Signs Last 24 Hrs  T(C): 36.6 (29 Dec 2023 14:39), Max: 36.9 (28 Dec 2023 20:59)  T(F): 97.9 (29 Dec 2023 14:39), Max: 98.4 (28 Dec 2023 20:59)  HR: 142 (29 Dec 2023 14:39) (98 - 142)  BP: 114/73 (29 Dec 2023 14:39) (105/69 - 151/92)  BP(mean): --  RR: 20 (29 Dec 2023 14:39) (18 - 20)  SpO2: 95% (29 Dec 2023 14:39) (94% - 97%)    Parameters below as of 29 Dec 2023 14:39  Patient On (Oxygen Delivery Method): nasal cannula  O2 Flow (L/min): 4    PHYSICAL EXAM:  Gen: obese woman  	HEENT: +NC  	Pulm: decreased bs  	CV: RRR, S1S2; no rub  	Back: No spinal or CVA tenderness; no sacral edema  	Abd: +BS, soft, nontender/nondistended  	: No suprapubic tenderness  	LE: Warm, leg pitting edema     LABS:                        13.4   27.52 )-----------( 227      ( 29 Dec 2023 05:52 )             41.3     12-29    136  |  94<L>  |  69.4<H>  ----------------------------<  218<H>  5.0   |  30.0<H>  |  1.60<H>    Ca    9.3      29 Dec 2023 05:52      PTT - ( 29 Dec 2023 05:52 )  PTT:61.6 sec  Urinalysis Basic - ( 29 Dec 2023 05:52 )    Color: x / Appearance: x / SG: x / pH: x  Gluc: 218 mg/dL / Ketone: x  / Bili: x / Urobili: x   Blood: x / Protein: x / Nitrite: x   Leuk Esterase: x / RBC: x / WBC x   Sq Epi: x / Non Sq Epi: x / Bacteria: x              RADIOLOGY & ADDITIONAL TESTS:

## 2023-12-30 DIAGNOSIS — I10 ESSENTIAL (PRIMARY) HYPERTENSION: ICD-10-CM

## 2023-12-30 LAB
ANION GAP SERPL CALC-SCNC: 14 MMOL/L — SIGNIFICANT CHANGE UP (ref 5–17)
ANION GAP SERPL CALC-SCNC: 14 MMOL/L — SIGNIFICANT CHANGE UP (ref 5–17)
APTT BLD: 26.5 SEC — SIGNIFICANT CHANGE UP (ref 24.5–35.6)
APTT BLD: 26.5 SEC — SIGNIFICANT CHANGE UP (ref 24.5–35.6)
BUN SERPL-MCNC: 80 MG/DL — HIGH (ref 8–20)
BUN SERPL-MCNC: 80 MG/DL — HIGH (ref 8–20)
CALCIUM SERPL-MCNC: 9.3 MG/DL — SIGNIFICANT CHANGE UP (ref 8.4–10.5)
CALCIUM SERPL-MCNC: 9.3 MG/DL — SIGNIFICANT CHANGE UP (ref 8.4–10.5)
CHLORIDE SERPL-SCNC: 96 MMOL/L — SIGNIFICANT CHANGE UP (ref 96–108)
CHLORIDE SERPL-SCNC: 96 MMOL/L — SIGNIFICANT CHANGE UP (ref 96–108)
CO2 SERPL-SCNC: 28 MMOL/L — SIGNIFICANT CHANGE UP (ref 22–29)
CO2 SERPL-SCNC: 28 MMOL/L — SIGNIFICANT CHANGE UP (ref 22–29)
CREAT SERPL-MCNC: 1.64 MG/DL — HIGH (ref 0.5–1.3)
CREAT SERPL-MCNC: 1.64 MG/DL — HIGH (ref 0.5–1.3)
EGFR: 34 ML/MIN/1.73M2 — LOW
EGFR: 34 ML/MIN/1.73M2 — LOW
GLUCOSE BLDC GLUCOMTR-MCNC: 105 MG/DL — HIGH (ref 70–99)
GLUCOSE BLDC GLUCOMTR-MCNC: 105 MG/DL — HIGH (ref 70–99)
GLUCOSE BLDC GLUCOMTR-MCNC: 128 MG/DL — HIGH (ref 70–99)
GLUCOSE BLDC GLUCOMTR-MCNC: 128 MG/DL — HIGH (ref 70–99)
GLUCOSE BLDC GLUCOMTR-MCNC: 144 MG/DL — HIGH (ref 70–99)
GLUCOSE BLDC GLUCOMTR-MCNC: 144 MG/DL — HIGH (ref 70–99)
GLUCOSE BLDC GLUCOMTR-MCNC: 172 MG/DL — HIGH (ref 70–99)
GLUCOSE BLDC GLUCOMTR-MCNC: 172 MG/DL — HIGH (ref 70–99)
GLUCOSE BLDC GLUCOMTR-MCNC: 184 MG/DL — HIGH (ref 70–99)
GLUCOSE BLDC GLUCOMTR-MCNC: 184 MG/DL — HIGH (ref 70–99)
GLUCOSE BLDC GLUCOMTR-MCNC: 241 MG/DL — HIGH (ref 70–99)
GLUCOSE BLDC GLUCOMTR-MCNC: 241 MG/DL — HIGH (ref 70–99)
GLUCOSE BLDC GLUCOMTR-MCNC: 251 MG/DL — HIGH (ref 70–99)
GLUCOSE BLDC GLUCOMTR-MCNC: 251 MG/DL — HIGH (ref 70–99)
GLUCOSE BLDC GLUCOMTR-MCNC: 404 MG/DL — HIGH (ref 70–99)
GLUCOSE BLDC GLUCOMTR-MCNC: 404 MG/DL — HIGH (ref 70–99)
GLUCOSE SERPL-MCNC: 259 MG/DL — HIGH (ref 70–99)
GLUCOSE SERPL-MCNC: 259 MG/DL — HIGH (ref 70–99)
HCT VFR BLD CALC: 40 % — SIGNIFICANT CHANGE UP (ref 34.5–45)
HCT VFR BLD CALC: 40 % — SIGNIFICANT CHANGE UP (ref 34.5–45)
HGB BLD-MCNC: 12.8 G/DL — SIGNIFICANT CHANGE UP (ref 11.5–15.5)
HGB BLD-MCNC: 12.8 G/DL — SIGNIFICANT CHANGE UP (ref 11.5–15.5)
MCHC RBC-ENTMCNC: 28.8 PG — SIGNIFICANT CHANGE UP (ref 27–34)
MCHC RBC-ENTMCNC: 28.8 PG — SIGNIFICANT CHANGE UP (ref 27–34)
MCHC RBC-ENTMCNC: 32 GM/DL — SIGNIFICANT CHANGE UP (ref 32–36)
MCHC RBC-ENTMCNC: 32 GM/DL — SIGNIFICANT CHANGE UP (ref 32–36)
MCV RBC AUTO: 90.1 FL — SIGNIFICANT CHANGE UP (ref 80–100)
MCV RBC AUTO: 90.1 FL — SIGNIFICANT CHANGE UP (ref 80–100)
PLATELET # BLD AUTO: 227 K/UL — SIGNIFICANT CHANGE UP (ref 150–400)
PLATELET # BLD AUTO: 227 K/UL — SIGNIFICANT CHANGE UP (ref 150–400)
POTASSIUM SERPL-MCNC: 4.6 MMOL/L — SIGNIFICANT CHANGE UP (ref 3.5–5.3)
POTASSIUM SERPL-MCNC: 4.6 MMOL/L — SIGNIFICANT CHANGE UP (ref 3.5–5.3)
POTASSIUM SERPL-SCNC: 4.6 MMOL/L — SIGNIFICANT CHANGE UP (ref 3.5–5.3)
POTASSIUM SERPL-SCNC: 4.6 MMOL/L — SIGNIFICANT CHANGE UP (ref 3.5–5.3)
RAPID RVP RESULT: SIGNIFICANT CHANGE UP
RAPID RVP RESULT: SIGNIFICANT CHANGE UP
RBC # BLD: 4.44 M/UL — SIGNIFICANT CHANGE UP (ref 3.8–5.2)
RBC # BLD: 4.44 M/UL — SIGNIFICANT CHANGE UP (ref 3.8–5.2)
RBC # FLD: 14.4 % — SIGNIFICANT CHANGE UP (ref 10.3–14.5)
RBC # FLD: 14.4 % — SIGNIFICANT CHANGE UP (ref 10.3–14.5)
SARS-COV-2 RNA SPEC QL NAA+PROBE: SIGNIFICANT CHANGE UP
SARS-COV-2 RNA SPEC QL NAA+PROBE: SIGNIFICANT CHANGE UP
SODIUM SERPL-SCNC: 138 MMOL/L — SIGNIFICANT CHANGE UP (ref 135–145)
SODIUM SERPL-SCNC: 138 MMOL/L — SIGNIFICANT CHANGE UP (ref 135–145)
WBC # BLD: 20.82 K/UL — HIGH (ref 3.8–10.5)
WBC # BLD: 20.82 K/UL — HIGH (ref 3.8–10.5)
WBC # FLD AUTO: 20.82 K/UL — HIGH (ref 3.8–10.5)
WBC # FLD AUTO: 20.82 K/UL — HIGH (ref 3.8–10.5)

## 2023-12-30 PROCEDURE — 93010 ELECTROCARDIOGRAM REPORT: CPT

## 2023-12-30 PROCEDURE — 99232 SBSQ HOSP IP/OBS MODERATE 35: CPT

## 2023-12-30 PROCEDURE — 99233 SBSQ HOSP IP/OBS HIGH 50: CPT

## 2023-12-30 PROCEDURE — 71045 X-RAY EXAM CHEST 1 VIEW: CPT | Mod: 26

## 2023-12-30 RX ORDER — METOPROLOL TARTRATE 50 MG
150 TABLET ORAL
Refills: 0 | Status: DISCONTINUED | OUTPATIENT
Start: 2023-12-30 | End: 2024-01-01

## 2023-12-30 RX ORDER — INSULIN LISPRO 100/ML
38 VIAL (ML) SUBCUTANEOUS
Refills: 0 | Status: DISCONTINUED | OUTPATIENT
Start: 2023-12-30 | End: 2024-01-01

## 2023-12-30 RX ORDER — OXYCODONE HYDROCHLORIDE 5 MG/1
5 TABLET ORAL EVERY 6 HOURS
Refills: 0 | Status: DISCONTINUED | OUTPATIENT
Start: 2023-12-30 | End: 2024-01-06

## 2023-12-30 RX ORDER — ALPRAZOLAM 0.25 MG
0.25 TABLET ORAL EVERY 8 HOURS
Refills: 0 | Status: DISCONTINUED | OUTPATIENT
Start: 2023-12-30 | End: 2024-01-06

## 2023-12-30 RX ADMIN — FAMOTIDINE 20 MILLIGRAM(S): 10 INJECTION INTRAVENOUS at 13:00

## 2023-12-30 RX ADMIN — Medication 1200 MILLIGRAM(S): at 19:22

## 2023-12-30 RX ADMIN — Medication 150 MILLIGRAM(S): at 17:16

## 2023-12-30 RX ADMIN — ATORVASTATIN CALCIUM 80 MILLIGRAM(S): 80 TABLET, FILM COATED ORAL at 21:45

## 2023-12-30 RX ADMIN — Medication 500 MICROGRAM(S): at 08:34

## 2023-12-30 RX ADMIN — Medication 0.25 MILLIGRAM(S): at 16:59

## 2023-12-30 RX ADMIN — CHLORHEXIDINE GLUCONATE 1 APPLICATION(S): 213 SOLUTION TOPICAL at 04:55

## 2023-12-30 RX ADMIN — Medication 1 APPLICATION(S): at 05:20

## 2023-12-30 RX ADMIN — POLYETHYLENE GLYCOL 3350 17 GRAM(S): 17 POWDER, FOR SOLUTION ORAL at 13:00

## 2023-12-30 RX ADMIN — Medication 4 MILLILITER(S): at 08:35

## 2023-12-30 RX ADMIN — Medication 650 MILLIGRAM(S): at 05:03

## 2023-12-30 RX ADMIN — LACTULOSE 20 GRAM(S): 10 SOLUTION ORAL at 17:16

## 2023-12-30 RX ADMIN — Medication 35 UNIT(S): at 08:38

## 2023-12-30 RX ADMIN — Medication 1200 MILLIGRAM(S): at 05:05

## 2023-12-30 RX ADMIN — Medication 500 MICROGRAM(S): at 14:34

## 2023-12-30 RX ADMIN — SENNA PLUS 2 TABLET(S): 8.6 TABLET ORAL at 21:45

## 2023-12-30 RX ADMIN — NYSTATIN CREAM 1 APPLICATION(S): 100000 CREAM TOPICAL at 21:45

## 2023-12-30 RX ADMIN — LEVALBUTEROL 0.63 MILLIGRAM(S): 1.25 SOLUTION, CONCENTRATE RESPIRATORY (INHALATION) at 03:45

## 2023-12-30 RX ADMIN — INSULIN GLARGINE 40 UNIT(S): 100 INJECTION, SOLUTION SUBCUTANEOUS at 22:43

## 2023-12-30 RX ADMIN — RIVAROXABAN 15 MILLIGRAM(S): KIT at 05:05

## 2023-12-30 RX ADMIN — OXYCODONE HYDROCHLORIDE 5 MILLIGRAM(S): 5 TABLET ORAL at 11:32

## 2023-12-30 RX ADMIN — Medication 1 APPLICATION(S): at 21:45

## 2023-12-30 RX ADMIN — Medication 6: at 08:37

## 2023-12-30 RX ADMIN — Medication 20 MILLIGRAM(S): at 05:04

## 2023-12-30 RX ADMIN — BUDESONIDE AND FORMOTEROL FUMARATE DIHYDRATE 2 PUFF(S): 160; 4.5 AEROSOL RESPIRATORY (INHALATION) at 08:35

## 2023-12-30 RX ADMIN — Medication 8: at 12:54

## 2023-12-30 RX ADMIN — Medication 500 MICROGRAM(S): at 03:45

## 2023-12-30 RX ADMIN — Medication 650 MILLIGRAM(S): at 06:00

## 2023-12-30 RX ADMIN — LEVALBUTEROL 0.63 MILLIGRAM(S): 1.25 SOLUTION, CONCENTRATE RESPIRATORY (INHALATION) at 08:34

## 2023-12-30 RX ADMIN — LEVALBUTEROL 0.63 MILLIGRAM(S): 1.25 SOLUTION, CONCENTRATE RESPIRATORY (INHALATION) at 14:33

## 2023-12-30 RX ADMIN — Medication 75 MILLIGRAM(S): at 05:03

## 2023-12-30 RX ADMIN — NYSTATIN CREAM 1 APPLICATION(S): 100000 CREAM TOPICAL at 05:06

## 2023-12-30 RX ADMIN — Medication 38 UNIT(S): at 12:55

## 2023-12-30 RX ADMIN — ONDANSETRON 4 MILLIGRAM(S): 8 TABLET, FILM COATED ORAL at 21:45

## 2023-12-30 RX ADMIN — INSULIN GLARGINE 40 UNIT(S): 100 INJECTION, SOLUTION SUBCUTANEOUS at 08:37

## 2023-12-30 NOTE — PROGRESS NOTE ADULT - SUBJECTIVE AND OBJECTIVE BOX
Department of Cardiology                                                                  [facility]/[place] [address] [address]                                                            Telephone: [phone]. Fax:[phone]                                                                         Cardiology Progress note        HPI:    67 y/o female with hx of HFpEF (EF 55-60% 8/2023), on home O2 4L, pulmonary HTN, morbid obesity, asthma, CKD3, IDDM2, uterine cancer s/p MEGAN, DVT LLE (2004), chronic leg edema 2/2 venous stasis, spinal stenosis with chronic back pain who presents with midsternal chest pressure and shortness of breath. Patient has had multiple hospitalizations since September 2023. She had hospitalization 8/30-9/13/23 for acute on chronic decompensated HFpEF with possible presumed PE and severe pulmonary hypertension (PASP of 70 mmHg). During that admission, she was profoundly volume overloaded requiring IV diuresis; elevated DDimer 407; US lower extremity edema: Markedly limited visualization. Unable to perform compression. No evidence of deep venous thrombosis in either lower extremity. +trop .09-> .08-> .06. TTE during that time: LVEF 55-60%; moderate RVE and moderately reduced RVSF; PASP 70.9mmHg. Due to patient's weight and renal function, ischemic testing cath/ NST was precluded. Patient was ultimately discharged home on oral diuresis and eliquis 5mg PO BID.   Patient had admission to [facility] 9/18-9/29/23 for complaints of rectal pain; constipation and no reported BM x 2 weeks. CT scan consistent for  constipation Rectal pain may also be d/t anal fissure vs thrombosed hemorrhoids, started on bowel regimen and nitroglycerin  ointment rectally BID for possible fissure vs thrombosed hemorrhoids. Colorectal surgery consulted & rec outpatient follow up. During her stay, she had an acute UTI treated with Ceftriaxone x 3 days. Patient was recommended to being discharged to [facility] but refused was ultimately discharged home on 9/29/23.   Patient had readmission back to [facility] on 9/29/23 for b/l LE weakness, legs giving out while walking and almost falling. She was admitted for ROSE placement and ultimately discharged on 10/4/23.   he presented to [facility] 11/24/23 for severe rectal pain with defecation evaluated by colorectal surgery in which exam was consistent with anal fissure. CT A/P w/o acute pathology; no acute surgical intervention; SITZ bath TID, Miralax/Senna, Lidocaine Cr post BM, Nitroglycerin ointment BID. Pt also with new wound anterior right ankle area with no signs of infection and no signs of cellulitis.  Seen by podiatry, Rx betadine with gauze and yasir right ankle. Patient was ultimately discharged home on 12/1.   On  12/16/23 [facility] She states she had multiple episodes of non radiating mid chest pressure and shortness of breath at rest.    Patient reports that her dyspnea just worsened and she told the nursing home to send her in for an evaluation. While in the ED, patient was placed on BIPAP and was given IV lasix with some improvement. RVP then results as RSV +. Patient had a CT scan which also showed pneumonia and pulmonary HTN. hospital course C/B New Afib,  on lopressor 75mg po q8 hourly and lopressor 5mg ivp PRN, echo w/ preserved EF, - c/w heparin drip for [id number]   pt remains afebrile and resp status improving, weaned off from BIPAP to now on NC , BIPAP HS    CT chest reads worsening middle lobe PNA, WBC likely reactive to steroids, S/P Abx, - repeat blood clx NGTD  Patient s/p diagnostic  Right and  left heart cath yesterday, revealed mild irregularities of LAD, RCA and Circumflex  Right heart pressures associated with severe pulmonary HTN  Patient with significant improvement in SOb today, exndorses feeling much better, good apetite, and denies HA, Dizziness, sob,CP, Palpitations.      Overnight events: none        Plan: Change Bb dose to achieve better control of afib rate         PAST MEDICAL & SURGICAL HISTORY:  Diabetes Mellitus Type II      HTN (Hypertension)      Endometrial Hyperplasia      Cervical Stenosis of Spine      Spinal Stenosis, Lumbar      Deep Vein Thrombosis (DVT)  Left leg, 2004, treated and resolved      Dyslipidemia      Cataract      Morbid Obesity      Vitamin D deficiency      Insomnia      CKD (chronic kidney disease)  ~ III      Congestive heart failure  ~ HFpEF      Uterine cancer      Asthma      On home oxygen therapy      Gait difficulty  ~ u ses walker      Cataract extracted with lens implant 1998  Right      C Section 1994      Cholecystectomy/appendectomy @ age 25      D&C x2 1980's      D&C 2008  hysteroscopy, endometrial hyperplasia, 2009      Cervical Spinal Stenosis surgery x2 (01/2002, 7/2002)      Tonsillectomy as a child      Endometrial biopsy 12/02/09      H/O laser iridotomy  left eye, 2016      H/O colonoscopy  1998      S/P total abdominal hysterectomy and bilateral salpingo-oophorectomy      S/P appendectomy      S/P cholecystectomy          RADIOLOGY & ADDITIONAL STUDIES/DIAGNOSTIC TESTING:      CATHETERIZATION  FINDINGS:    < from: Cardiac Catheterization (12.29.23 @ 16:05) >  Coronary Angiography   The coronary circulation is right dominant.      LM   Left main artery: The segment is visually normal in size and  structure. Angiography shows no disease. LUZ MARIA Flow 3.    LAD   Left anterior descending artery: Angiography shows minor  irregularities. LUZ MARIA Flow 3. First diagonal: Angiography shows minor  irregularities. LUZ MARIA Flow 3.      CX   Circumflex: Angiography shows minor irregularities. LUZ MARIA Flow 3. First  obtuse marginal: Angiography shows minor irregularities.  LUZ MARIA Flow 3.      RCA   Right coronary artery: The segment is large, dominant. Angiography  shows minor irregularities. LUZ MARIA Flow 3.    Left Heart Cath   Left ventricular function was assessed and demonstrates normal LV wall  motion. All remaining scored wall segments are  normal. Global left ventricular function is mildly depressed. Ejection  fraction was calculated by LV Gram with a value of 50%.  The left ventricle is normal in size. LV to AO pullback was performed  and there is no pressure gradient. The left ventricular end  diastolic pressure was 18 mmHg. LHC performed: Aortic valve crossed  and left ventricular pressures were obtained.  Valves     < end of copied text >    S/P RHC  Right heart hemodynamics s below:  PA: 67/25/40  RV: 67/2/13  RA: 16/16/13  LV: 123/9/16  PA SAT: 53.8%  AO SAT: 86.8^  CO: 5.27  CI: 2.0      Allergies    wool- rash, itch (Other)  adhesives (Rash)  latex (Rash)  Bactrim (Flushing)    Intolerances        MEDICATIONS  (STANDING):  acetylcysteine  Oral Solution 1200 milliGRAM(s) Oral every 12 hours  acetylcysteine 20%  Inhalation 4 milliLiter(s) Inhalation daily  atorvastatin 80 milliGRAM(s) Oral at bedtime  budesonide  80 MICROgram(s)/formoterol 4.5 MICROgram(s) Inhaler 2 Puff(s) Inhalation two times a day  chlorhexidine 2% Cloths 1 Application(s) Topical <User Schedule>  clotrimazole 1% Cream 1 Application(s) Topical two times a day  dextrose 5%. 1000 milliLiter(s) (50 mL/Hr) IV Continuous <Continuous>  dextrose 5%. 1000 milliLiter(s) (100 mL/Hr) IV Continuous <Continuous>  dextrose 50% Injectable 12.5 Gram(s) IV Push once  dextrose 50% Injectable 25 Gram(s) IV Push once  dextrose 50% Injectable 25 Gram(s) IV Push once  famotidine    Tablet 20 milliGRAM(s) Oral daily  glucagon  Injectable 1 milliGRAM(s) IntraMuscular once  insulin glargine Injectable (LANTUS) 40 Unit(s) SubCutaneous every morning  insulin glargine Injectable (LANTUS) 40 Unit(s) SubCutaneous at bedtime  insulin lispro (ADMELOG) corrective regimen sliding scale   SubCutaneous three times a day before meals  insulin lispro Injectable (ADMELOG) 38 Unit(s) SubCutaneous three times a day before meals  ipratropium    for Nebulization 500 MICROGram(s) Nebulizer every 6 hours  lactulose Syrup 20 Gram(s) Oral daily  levalbuterol Inhalation 0.63 milliGRAM(s) Inhalation every 6 hours  metoprolol tartrate 150 milliGRAM(s) Oral two times a day  nystatin Powder 1 Application(s) Topical two times a day  polyethylene glycol 3350 17 Gram(s) Oral daily  rivaroxaban 15 milliGRAM(s) Oral daily  senna 2 Tablet(s) Oral at bedtime  torsemide 20 milliGRAM(s) Oral daily    MEDICATIONS  (PRN):  acetaminophen     Tablet .. 650 milliGRAM(s) Oral every 6 hours PRN Temp greater or equal to 38C (100.4F), Mild Pain (1 - 3)  albuterol    90 MICROgram(s) HFA Inhaler 2 Puff(s) Inhalation every 2 hours PRN Shortness of Breath and/or Wheezing  dextrose Oral Gel 15 Gram(s) Oral once PRN Blood Glucose LESS THAN 70 milliGRAM(s)/deciliter  diphenhydrAMINE 25 milliGRAM(s) Oral every 6 hours PRN Rash and/or Itching  hydrALAZINE Injectable 10 milliGRAM(s) IV Push every 4 hours PRN for systolic bp > 160  melatonin 3 milliGRAM(s) Oral at bedtime PRN Insomnia  metoprolol tartrate Injectable 5 milliGRAM(s) IV Push every 6 hours PRN heart rate sustaining greater than 130  ondansetron Injectable 4 milliGRAM(s) IV Push every 8 hours PRN Nausea and/or Vomiting  oxyCODONE    IR 5 milliGRAM(s) Oral every 6 hours PRN Moderate Pain (4 - 6)        REVIEW OF SYSTEMS:  General: Morbidly obese, No fevers/chills, or fatigue  HEENT: No visual disturbances, no hearing loss, no headaches, no epistaxis.  CV: No chest pain,no palpitations, no edema, no orthopnea, no PND, or LEGER  Respiratory: No dyspnea, no wheeze, no cough.  GI: no nausea/vomiting, no black/bloody stools.  : No hematuria  Musculoskeletal: No myalgias, no arthralgias, no back pain.    Vital Signs Last 24 Hrs  T(C): 36.5 (30 Dec 2023 08:00), Max: 36.9 (29 Dec 2023 22:10)  T(F): 97.7 (30 Dec 2023 08:00), Max: 98.5 (29 Dec 2023 22:10)  HR: 100 (30 Dec 2023 08:35) (94 - 142)  BP: 113/55 (30 Dec 2023 08:00) (100/53 - 141/60)  BP(mean): --  RR: 17 (30 Dec 2023 08:35) (17 - 20)  SpO2: 95% (30 Dec 2023 08:35) (94% - 98%)    Parameters below as of 30 Dec 2023 08:35  Patient On (Oxygen Delivery Method): nasal cannula  O2 Flow (L/min): 4      Daily     Daily     I&O's Detail    29 Dec 2023 07:01  -  30 Dec 2023 07:00  --------------------------------------------------------  IN:    Heparin Infusion: 60 mL    Oral Fluid: 400 mL  Total IN: 460 mL    OUT:    Indwelling Catheter - Urethral (mL): 2075 mL  Total OUT: 2075 mL    Total NET: -1615 mL          PHYSICAL EXAM:   GENERAL: Morbidly obese, sitting in bed comfortably,  NAD. On 4L O2NC   ENMT: PERRL, +EOMI.  NECK: soft, Supple, No JVD,   CHEST/LUNG: Clear to auscultatation bilaterally; No wheezing.  HEART: S1S2+, Regular rate and rhythm; No murmurs.  ABDOMEN: Soft, Nontender, Nondistended; Bowel sounds present.  MUSCULOSKELETAL: Normal range of motion.  SKIN: No rashes or lesions.  NEURO: AAOX3, no focal deficits, no motor r sensory loss.  PSYCH: normal mood.  VASCULAR:   Radial +2 R/+2 L  Femoral +2 R/+2 L  PT +2 R/+2 L  DP +2 R/+2 L    +2 edema on B/L LE, hard to assess extend of edema due to body habitus      INTERPRETATION OF TELEMETRY: Afib, Hr in 90's to 110's , occasionally HR trending upto 130's      LABS:                        12.8   20.82 )-----------( 227      ( 30 Dec 2023 06:11 )             40.0     12-30    138  |  96  |  80.0<H>  ----------------------------<  259<H>  4.6   |  28.0  |  1.64<H>    Ca    9.3      30 Dec 2023 06:11          PTT - ( 30 Dec 2023 06:11 )  PTT:26.5 sec  Urinalysis Basic - ( 30 Dec 2023 06:11 )    Color: x / Appearance: x / SG: x / pH: x  Gluc: 259 mg/dL / Ketone: x  / Bili: x / Urobili: x   Blood: x / Protein: x / Nitrite: x   Leuk Esterase: x / RBC: x / WBC x   Sq Epi: x / Non Sq Epi: x / Bacteria: x      I&O's Summary    29 Dec 2023 07:01  -  30 Dec 2023 07:00  --------------------------------------------------------  IN: 460 mL / OUT: 2075 mL / NET: -1615 mL      BNP                    Acute on chronic HFpEF, NYHA III, ACC C  Bacterial pneumonia  Pulmonary HYN, Severe ( PASP 70 mmHg)  Super morbid obesity  CKD stage 3  insulin dependent diabetes mellitus  ?presumed PE  hx of spinal stenosis with chronic back pain  hx of LLE DVT 2004  chronic lower extremity edema  venous stasis dermatitis  asthma  hypoxemic respiratory failure        Clearance received from ID, hospitalist medicine, Nephrology  Patient agrees to R+ LHC    proceed with R+LHC    further plan pending invasive investigation.  continue GDMT      needs lifestyle cardiovascular modifications, shes contemplating.                                                                                 Department of Cardiology                                                                  [facility]/[place] [address] [address]                                                            Telephone: [phone]. Fax:[phone]                                                                         Cardiology Progress note        HPI:    69 y/o female with hx of HFpEF (EF 55-60% 8/2023), on home O2 4L, pulmonary HTN, morbid obesity, asthma, CKD3, IDDM2, uterine cancer s/p MEGAN, DVT LLE (2004), chronic leg edema 2/2 venous stasis, spinal stenosis with chronic back pain who presents with midsternal chest pressure and shortness of breath. Patient has had multiple hospitalizations since September 2023. She had hospitalization 8/30-9/13/23 for acute on chronic decompensated HFpEF with possible presumed PE and severe pulmonary hypertension (PASP of 70 mmHg). During that admission, she was profoundly volume overloaded requiring IV diuresis; elevated DDimer 407; US lower extremity edema: Markedly limited visualization. Unable to perform compression. No evidence of deep venous thrombosis in either lower extremity. +trop .09-> .08-> .06. TTE during that time: LVEF 55-60%; moderate RVE and moderately reduced RVSF; PASP 70.9mmHg. Due to patient's weight and renal function, ischemic testing cath/ NST was precluded. Patient was ultimately discharged home on oral diuresis and eliquis 5mg PO BID.   Patient had admission to [facility] 9/18-9/29/23 for complaints of rectal pain; constipation and no reported BM x 2 weeks. CT scan consistent for  constipation Rectal pain may also be d/t anal fissure vs thrombosed hemorrhoids, started on bowel regimen and nitroglycerin  ointment rectally BID for possible fissure vs thrombosed hemorrhoids. Colorectal surgery consulted & rec outpatient follow up. During her stay, she had an acute UTI treated with Ceftriaxone x 3 days. Patient was recommended to being discharged to [facility] but refused was ultimately discharged home on 9/29/23.   Patient had readmission back to [facility] on 9/29/23 for b/l LE weakness, legs giving out while walking and almost falling. She was admitted for ROSE placement and ultimately discharged on 10/4/23.   he presented to [facility] 11/24/23 for severe rectal pain with defecation evaluated by colorectal surgery in which exam was consistent with anal fissure. CT A/P w/o acute pathology; no acute surgical intervention; SITZ bath TID, Miralax/Senna, Lidocaine Cr post BM, Nitroglycerin ointment BID. Pt also with new wound anterior right ankle area with no signs of infection and no signs of cellulitis.  Seen by podiatry, Rx betadine with gauze and yasir right ankle. Patient was ultimately discharged home on 12/1.   On  12/16/23 [facility] She states she had multiple episodes of non radiating mid chest pressure and shortness of breath at rest.    Patient reports that her dyspnea just worsened and she told the nursing home to send her in for an evaluation. While in the ED, patient was placed on BIPAP and was given IV lasix with some improvement. RVP then results as RSV +. Patient had a CT scan which also showed pneumonia and pulmonary HTN. hospital course C/B New Afib,  on lopressor 75mg po q8 hourly and lopressor 5mg ivp PRN, echo w/ preserved EF, - c/w heparin drip for [id number]   pt remains afebrile and resp status improving, weaned off from BIPAP to now on NC , BIPAP HS    CT chest reads worsening middle lobe PNA, WBC likely reactive to steroids, S/P Abx, - repeat blood clx NGTD  Patient s/p diagnostic  Right and  left heart cath yesterday, revealed mild irregularities of LAD, RCA and Circumflex  Right heart pressures associated with severe pulmonary HTN  Patient with significant improvement in SOb today, exndorses feeling much better, good apetite, and denies HA, Dizziness, sob,CP, Palpitations.      Overnight events: none        Plan: Change Bb dose to achieve better control of afib rate         PAST MEDICAL & SURGICAL HISTORY:  Diabetes Mellitus Type II      HTN (Hypertension)      Endometrial Hyperplasia      Cervical Stenosis of Spine      Spinal Stenosis, Lumbar      Deep Vein Thrombosis (DVT)  Left leg, 2004, treated and resolved      Dyslipidemia      Cataract      Morbid Obesity      Vitamin D deficiency      Insomnia      CKD (chronic kidney disease)  ~ III      Congestive heart failure  ~ HFpEF      Uterine cancer      Asthma      On home oxygen therapy      Gait difficulty  ~ u ses walker      Cataract extracted with lens implant 1998  Right      C Section 1994      Cholecystectomy/appendectomy @ age 25      D&C x2 1980's      D&C 2008  hysteroscopy, endometrial hyperplasia, 2009      Cervical Spinal Stenosis surgery x2 (01/2002, 7/2002)      Tonsillectomy as a child      Endometrial biopsy 12/02/09      H/O laser iridotomy  left eye, 2016      H/O colonoscopy  1998      S/P total abdominal hysterectomy and bilateral salpingo-oophorectomy      S/P appendectomy      S/P cholecystectomy          RADIOLOGY & ADDITIONAL STUDIES/DIAGNOSTIC TESTING:      CATHETERIZATION  FINDINGS:    < from: Cardiac Catheterization (12.29.23 @ 16:05) >  Coronary Angiography   The coronary circulation is right dominant.      LM   Left main artery: The segment is visually normal in size and  structure. Angiography shows no disease. LUZ MARIA Flow 3.    LAD   Left anterior descending artery: Angiography shows minor  irregularities. LUZ MARIA Flow 3. First diagonal: Angiography shows minor  irregularities. LUZ MARIA Flow 3.      CX   Circumflex: Angiography shows minor irregularities. LUZ MARIA Flow 3. First  obtuse marginal: Angiography shows minor irregularities.  LUZ MARIA Flow 3.      RCA   Right coronary artery: The segment is large, dominant. Angiography  shows minor irregularities. LUZ MARIA Flow 3.    Left Heart Cath   Left ventricular function was assessed and demonstrates normal LV wall  motion. All remaining scored wall segments are  normal. Global left ventricular function is mildly depressed. Ejection  fraction was calculated by LV Gram with a value of 50%.  The left ventricle is normal in size. LV to AO pullback was performed  and there is no pressure gradient. The left ventricular end  diastolic pressure was 18 mmHg. LHC performed: Aortic valve crossed  and left ventricular pressures were obtained.  Valves     < end of copied text >    S/P RHC  Right heart hemodynamics s below:  PA: 67/25/40  RV: 67/2/13  RA: 16/16/13  LV: 123/9/16  PA SAT: 53.8%  AO SAT: 86.8^  CO: 5.27  CI: 2.0      Allergies    wool- rash, itch (Other)  adhesives (Rash)  latex (Rash)  Bactrim (Flushing)    Intolerances        MEDICATIONS  (STANDING):  acetylcysteine  Oral Solution 1200 milliGRAM(s) Oral every 12 hours  acetylcysteine 20%  Inhalation 4 milliLiter(s) Inhalation daily  atorvastatin 80 milliGRAM(s) Oral at bedtime  budesonide  80 MICROgram(s)/formoterol 4.5 MICROgram(s) Inhaler 2 Puff(s) Inhalation two times a day  chlorhexidine 2% Cloths 1 Application(s) Topical <User Schedule>  clotrimazole 1% Cream 1 Application(s) Topical two times a day  dextrose 5%. 1000 milliLiter(s) (50 mL/Hr) IV Continuous <Continuous>  dextrose 5%. 1000 milliLiter(s) (100 mL/Hr) IV Continuous <Continuous>  dextrose 50% Injectable 12.5 Gram(s) IV Push once  dextrose 50% Injectable 25 Gram(s) IV Push once  dextrose 50% Injectable 25 Gram(s) IV Push once  famotidine    Tablet 20 milliGRAM(s) Oral daily  glucagon  Injectable 1 milliGRAM(s) IntraMuscular once  insulin glargine Injectable (LANTUS) 40 Unit(s) SubCutaneous every morning  insulin glargine Injectable (LANTUS) 40 Unit(s) SubCutaneous at bedtime  insulin lispro (ADMELOG) corrective regimen sliding scale   SubCutaneous three times a day before meals  insulin lispro Injectable (ADMELOG) 38 Unit(s) SubCutaneous three times a day before meals  ipratropium    for Nebulization 500 MICROGram(s) Nebulizer every 6 hours  lactulose Syrup 20 Gram(s) Oral daily  levalbuterol Inhalation 0.63 milliGRAM(s) Inhalation every 6 hours  metoprolol tartrate 150 milliGRAM(s) Oral two times a day  nystatin Powder 1 Application(s) Topical two times a day  polyethylene glycol 3350 17 Gram(s) Oral daily  rivaroxaban 15 milliGRAM(s) Oral daily  senna 2 Tablet(s) Oral at bedtime  torsemide 20 milliGRAM(s) Oral daily    MEDICATIONS  (PRN):  acetaminophen     Tablet .. 650 milliGRAM(s) Oral every 6 hours PRN Temp greater or equal to 38C (100.4F), Mild Pain (1 - 3)  albuterol    90 MICROgram(s) HFA Inhaler 2 Puff(s) Inhalation every 2 hours PRN Shortness of Breath and/or Wheezing  dextrose Oral Gel 15 Gram(s) Oral once PRN Blood Glucose LESS THAN 70 milliGRAM(s)/deciliter  diphenhydrAMINE 25 milliGRAM(s) Oral every 6 hours PRN Rash and/or Itching  hydrALAZINE Injectable 10 milliGRAM(s) IV Push every 4 hours PRN for systolic bp > 160  melatonin 3 milliGRAM(s) Oral at bedtime PRN Insomnia  metoprolol tartrate Injectable 5 milliGRAM(s) IV Push every 6 hours PRN heart rate sustaining greater than 130  ondansetron Injectable 4 milliGRAM(s) IV Push every 8 hours PRN Nausea and/or Vomiting  oxyCODONE    IR 5 milliGRAM(s) Oral every 6 hours PRN Moderate Pain (4 - 6)        REVIEW OF SYSTEMS:  General: Morbidly obese, No fevers/chills, or fatigue  HEENT: No visual disturbances, no hearing loss, no headaches, no epistaxis.  CV: No chest pain,no palpitations, no edema, no orthopnea, no PND, or LEGER  Respiratory: No dyspnea, no wheeze, no cough.  GI: no nausea/vomiting, no black/bloody stools.  : No hematuria  Musculoskeletal: No myalgias, no arthralgias, no back pain.    Vital Signs Last 24 Hrs  T(C): 36.5 (30 Dec 2023 08:00), Max: 36.9 (29 Dec 2023 22:10)  T(F): 97.7 (30 Dec 2023 08:00), Max: 98.5 (29 Dec 2023 22:10)  HR: 100 (30 Dec 2023 08:35) (94 - 142)  BP: 113/55 (30 Dec 2023 08:00) (100/53 - 141/60)  BP(mean): --  RR: 17 (30 Dec 2023 08:35) (17 - 20)  SpO2: 95% (30 Dec 2023 08:35) (94% - 98%)    Parameters below as of 30 Dec 2023 08:35  Patient On (Oxygen Delivery Method): nasal cannula  O2 Flow (L/min): 4      Daily     Daily     I&O's Detail    29 Dec 2023 07:01  -  30 Dec 2023 07:00  --------------------------------------------------------  IN:    Heparin Infusion: 60 mL    Oral Fluid: 400 mL  Total IN: 460 mL    OUT:    Indwelling Catheter - Urethral (mL): 2075 mL  Total OUT: 2075 mL    Total NET: -1615 mL          PHYSICAL EXAM:   GENERAL: Morbidly obese, sitting in bed comfortably,  NAD. On 4L O2NC   ENMT: PERRL, +EOMI.  NECK: soft, Supple, No JVD,   CHEST/LUNG: Clear to auscultatation bilaterally; No wheezing.  HEART: S1S2+, Regular rate and rhythm; No murmurs.  ABDOMEN: Soft, Nontender, Nondistended; Bowel sounds present.  MUSCULOSKELETAL: Normal range of motion.  SKIN: No rashes or lesions.  NEURO: AAOX3, no focal deficits, no motor r sensory loss.  PSYCH: normal mood.  VASCULAR:   Radial +2 R/+2 L  Femoral +2 R/+2 L  PT +2 R/+2 L  DP +2 R/+2 L    +2 edema on B/L LE, hard to assess extend of edema due to body habitus      INTERPRETATION OF TELEMETRY: Afib, Hr in 90's to 110's , occasionally HR trending upto 130's      LABS:                        12.8   20.82 )-----------( 227      ( 30 Dec 2023 06:11 )             40.0     12-30    138  |  96  |  80.0<H>  ----------------------------<  259<H>  4.6   |  28.0  |  1.64<H>    Ca    9.3      30 Dec 2023 06:11          PTT - ( 30 Dec 2023 06:11 )  PTT:26.5 sec  Urinalysis Basic - ( 30 Dec 2023 06:11 )    Color: x / Appearance: x / SG: x / pH: x  Gluc: 259 mg/dL / Ketone: x  / Bili: x / Urobili: x   Blood: x / Protein: x / Nitrite: x   Leuk Esterase: x / RBC: x / WBC x   Sq Epi: x / Non Sq Epi: x / Bacteria: x      I&O's Summary    29 Dec 2023 07:01  -  30 Dec 2023 07:00  --------------------------------------------------------  IN: 460 mL / OUT: 2075 mL / NET: -1615 mL      BNP                    Acute on chronic HFpEF, NYHA III, ACC C  Bacterial pneumonia  Pulmonary HYN, Severe ( PASP 70 mmHg)  Super morbid obesity  CKD stage 3  insulin dependent diabetes mellitus  ?presumed PE  hx of spinal stenosis with chronic back pain  hx of LLE DVT 2004  chronic lower extremity edema  venous stasis dermatitis  asthma  hypoxemic respiratory failure        Clearance received from ID, hospitalist medicine, Nephrology  Patient agrees to R+ LHC    proceed with R+LHC    further plan pending invasive investigation.  continue GDMT      needs lifestyle cardiovascular modifications, shes contemplating.

## 2023-12-30 NOTE — PROGRESS NOTE ADULT - ASSESSMENT
9y/o female    1- acute on chroinic resp  failure hypoxic  2- +  RSV  on 12/16/2023 COPD - improved   3- RUL pneumonia ? 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   5- new afib  HF pEF  6-   cath 12/29/23  mild LAD   severe pulm htn   PASP 70 mmg Hg   LVEDP stable  ECHO  grade 2 diastolic dysfunctions   7- CKD 3  8- sleep disordered breathing -  tolerating pm cpap       - IV steroids now   q 12  can taper  tolerated cath  -s/p antibiotics  - incentive spirometry     - 4l/min     oxygen nc   titrate down oxygen as tolerated - improved with  diuresis   -xarelto to start     -increased lopressor to  150 mg po q 12  -    s/p  dig   -refused ct chest  will get cxr portable    -   will try to contact pulm HTN  service  -  pepcid    add am ppi   -sputum culture  - change  to xopenex   - PPI    will follow    Thank you kindly for allowing us to participate in this patients care.    Please  feel free to reach out with any questions or suggestions    MAKENNA EUBANKS    PULMONARY and CRITICAL CARE   [facility]ician [facility]     [phone]       9y/o female    1- acute on chroinic resp  failure hypoxic  2- +  RSV  on 12/16/2023 COPD - improved   3- RUL pneumonia ? 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   5- new afib  HF pEF  6-   cath 12/29/23  mild LAD   severe pulm htn   PASP 70 mmg Hg   LVEDP stable  ECHO  grade 2 diastolic dysfunctions   7- CKD 3  8- sleep disordered breathing -  tolerating pm cpap       - IV steroids now   q 12  can taper  tolerated cath  -s/p antibiotics  - incentive spirometry     - 4l/min     oxygen nc   titrate down oxygen as tolerated - improved with  diuresis   -xarelto to start     -increased lopressor to  150 mg po q 12  -    s/p  dig   -refused ct chest  will get cxr portable    -   will try to contact pulm HTN  service  -  pepcid    add am ppi   -sputum culture  - change  to xopenex   - PPI    will follow    Thank you kindly for allowing us to participate in this patients care.    Please  feel free to reach out with any questions or suggestions    MAKENNA EUBANKS    PULMONARY and CRITICAL CARE   [facility] Physician [facility]     [phone]

## 2023-12-30 NOTE — PROGRESS NOTE ADULT - PROBLEM SELECTOR PLAN 2
Mild CAD on yesterday's cath  C/W Statin  -started on Xarelto for afib, no aspirin needed as pt on AC  - DASH diet  -Lifestyle modifications discussed to reduce cardiovascular risk factors including weight reduction, smoking cessation, medication compliance, and routine follow up with Cardiologist to track your BMI, cholesterol, and glucose levels. Mild CAD on yesterday's cath  RRA and RBV site benign, no bleeding/hematoma, distal pulses 2+, NV status intact   C/W Statin  -started on Xarelto for afib, no aspirin needed as pt on AC  - DASH diet  -Lifestyle modifications discussed to reduce cardiovascular risk factors including weight reduction, smoking cessation, medication compliance, and routine follow up with Cardiologist to track your BMI, cholesterol, and glucose levels.

## 2023-12-30 NOTE — PROGRESS NOTE ADULT - PROBLEM SELECTOR PLAN 6
SBP 'S 'S   -C/W Metoprolol  -DASH and consistent carb diet  -Weight reduction    Cardiology signing off  Reconsult as necessary    Discussed with Attending

## 2023-12-30 NOTE — PROGRESS NOTE ADULT - NUTRITIONAL ASSESSMENT
This patient has been assessed with a concern for Malnutrition and has been determined to have a diagnosis/diagnoses of Severe protein-calorie malnutrition and Morbid obesity (BMI > 40).    This patient is being managed with:   Diet Consistent Carbohydrate w/Evening Snack-  1500mL Fluid Restriction ([id number])  Low Sodium  Entered: Dec 19 2023  6:23PM   This patient has been assessed with a concern for Malnutrition and has been determined to have a diagnosis/diagnoses of Severe protein-calorie malnutrition and Morbid obesity (BMI > 40).    This patient is being managed with:   Diet Consistent Carbohydrate w/Evening Snack-  1500mL Fluid Restriction ([id number])  Low Sodium  Entered: Dec 19 2023  6:23PM

## 2023-12-30 NOTE — PROGRESS NOTE ADULT - PROBLEM SELECTOR PLAN 3
- RSV positive with secondary Resp infection   - c/w zosyn   - c/w tiotropium, solumedrol, and formoterol  - bipap weaned off, currently on HFNC 40L/40%   - care per primary team.
AFIB IS new  Sustained afib with rate control not achieved  HR in 90's to 110's, occasionally trending upto 130's  CHADVASC score :5  Started on Xarelto 15mg po daily for afib (GFR: 34)  Switch metoprolol 75mg tid to Metoprolol tartrate 150mg po bid for better sarah control  -C/w Tele monitoring   -Lifestyle modifications discussed to reduce cardiovascular risk factors including weight reduction, smoking cessation, medication compliance, and routine follow up with Cardiologist to track your BMI, cholesterol, and glucose levels.

## 2023-12-30 NOTE — CHART NOTE - NSCHARTNOTEFT_GEN_A_CORE
Called by MD to assess patient for CP. Pt seen at bedside and reports midsternal chest pain that is a 5/10, in addition she had an episode of diaphoresis. Pt denies any other acute complaints. Pt had cath yesterday with mild CAD. EKG currently unchanged. Called cardiac NP, concern for potential pericardial effusion. Per Cardiology, low suspicion, this is cardiac in nature, however will order limited TTE to evaluate. Pt reporting some mild anxiety, will give xanax. Pt states CP has since improved and she is feeling getter. RN advised to notify PA for any further changes. Called by MD to assess patient for CP. Pt seen at bedside and reports midsternal chest pain that is a 5/10, in addition she had an episode of diaphoresis. Pt denies any other acute complaints. Pt had cath yesterday with mild CAD. EKG currently unchanged. Called cardiac NP, concern for potential pericardial effusion. Per Cardiology, low suspicion, this is cardiac in nature, however will order limited TTE to evaluate. VSS. Pt reporting some mild anxiety, will give xanax. Pt states CP has since improved and she is feeling getter. RN advised to notify PA for any further changes.

## 2023-12-30 NOTE — PROGRESS NOTE ADULT - ASSESSMENT
67 yo female with HTN, HLD, DM2, HFpEF, CKD III, DVT, Uterine CA, COPD on home o2 who presented with complaints of shortness of breath. Patient reports that she has been sick all week and was recently started on Vantin and steroids while at the nursing home. Pt admitted for acute on chronic respiratory failure sec to Acute HFpEF exacerbation with likely with Superimposed bacterial Pneumonia in setting of RSV, Severe pulm HTN, new Afib..CT scan which also showed pneumonia and pulmonary HTN. S/P C 12/29 with mild LAD disease,pulm htn. Pt was wean off from BIPAP,now on NC. and was given IV lasix,Started on heparin drip and changed to Xarelto after cath.    New afib:  echo w/ preserved EF    - c/w loprssor 50mg q8hr  - Xarelto tomorrow  --EP consulted, no DCCV given resp issues  -F/U EP rec  - TTE W or WO Ultrasound Enhancing Agent (12.18.23)EF 60 to 65%. There is moderate (grade 2) left ventricular diastolic dysfunction, with normal filling pressure.          # Acute on chronic respiratory failure  # Acute HFpEF exacerbation with likely with Superimposed bacterial Pneumonia in setting of RSV  # Severe pulm HTN  - pt remains afebrile and resp status improving, now on NC (likely new baseline)  - CT chest reads worsening middle lobe PNA  - sputum clx ordered for last several days, pt unable to bring up sputum  - repeat MRSA PCR negative  - ID consulted, WBC likely reactive to steroids  - antibiotics stopped on 12/28, WBC trended down  - pt high risk for recurrent aspiration  - repeat blood clx NGTD  - tapering steroids   - levalbuterol and ipratropium q6hrs, symbicort BID   - IV Lasix on hold for JACQUES  - BIPAP qhs  - pulm to f/u regarding severe pulm HTN seen on cath   --S/p [facility] mILD LAD disease/ Severe pulmonary HTN/ LVEDP stable  -F/U CARDIOLOGY REC    # previous suspicion for PE/ DVT on admission  - Patient has empirically been treated for PE/DVT since August  - No scans noted to have PE/DVT  - duplex lower ext to without dvt   - ddimer negative    # JACQUES  - likely combination of pre-renal and ATN  - lasix on hold  - will pre-hydrate before and after cath  - nephrology consulted  - trend BMP    #Chronic pain  - continue home Pain meds of Oxycodone 60 mg q12h and Oxycodone 5mg q6h PRN    # HLD  - Atorvastatin for rosuvastatin    # COPD  - tapering steroids   - Symbicort, Spiriva, albuterol  - pulm following     #DM  #steroid induced hyperglycemia  -High BG  -A1C 8.3  - [id number] u bid  -increase 38 u meal coverage  -lispro sliding scale  -f/u endocrine rec    DVT prophylaxis: heparin ggt  Dispo: pending cath and PT eval     67 yo female with HTN, HLD, DM2, HFpEF, CKD III, DVT, Uterine CA, COPD on home o2 who presented with complaints of shortness of breath. Patient reports that she has been sick all week and was recently started on Vantin and steroids while at the nursing home. Pt admitted for acute on chronic respiratory failure sec to Acute HFpEF exacerbation with likely with Superimposed bacterial Pneumonia in setting of RSV, Severe pulm HTN, new Afib..CT scan which also showed pneumonia and pulmonary HTN. S/P C 12/29 with mild LAD disease,pulm htn. Pt was wean off from BIPAP,now on NC. and was given IV lasix,Started on heparin drip and changed to Xarelto after cath.    New afib:  echo w/ preserved EF    - c/w loprssor 50mg q8hr  - Xarelto tomorrow  --EP consulted, no DCCV given resp issues  -F/U EP rec  - TTE W or WO Ultrasound Enhancing Agent (12.18.23)EF 60 to 65%. There is moderate (grade 2) left ventricular diastolic dysfunction, with normal filling pressure.          # Acute on chronic respiratory failure  # Acute HFpEF exacerbation with likely with Superimposed bacterial Pneumonia in setting of RSV  # Severe pulm HTN  - pt remains afebrile and resp status improving, now on NC (likely new baseline)  - CT chest reads worsening middle lobe PNA  - sputum clx ordered for last several days, pt unable to bring up sputum  - repeat MRSA PCR negative  - ID consulted, WBC likely reactive to steroids  - antibiotics stopped on 12/28, WBC trended down  - pt high risk for recurrent aspiration  - repeat blood clx NGTD  - tapering steroids   - levalbuterol and ipratropium q6hrs, symbicort BID   - IV Lasix on hold for JACQUES  - BIPAP qhs  - pulm to f/u regarding severe pulm HTN seen on cath   --S/p [facility] mILD LAD disease/ Severe pulmonary HTN/ LVEDP stable  -F/U CARDIOLOGY REC    # previous suspicion for PE/ DVT on admission  - Patient has empirically been treated for PE/DVT since August  - No scans noted to have PE/DVT  - duplex lower ext to without dvt   - ddimer negative    # JACQUES  - likely combination of pre-renal and ATN  - lasix on hold  - will pre-hydrate before and after cath  - nephrology consulted  - trend BMP    #Chronic pain  - continue home Pain meds of Oxycodone 60 mg q12h and Oxycodone 5mg q6h PRN    # HLD  - Atorvastatin for rosuvastatin    # COPD  - tapering steroids   - Symbicort, Spiriva, albuterol  - pulm following     #DM  #steroid induced hyperglycemia  -High BG  -A1C 8.3  - [id number] u bid  -increase 38 u meal coverage  -lispro sliding scale  -f/u endocrine rec    DVT prophylaxis: heparin ggt  Dispo: pending cath and PT eval     69 yo female with HTN, HLD, DM2, HFpEF, CKD III, DVT, Uterine CA, COPD on home o2 who presented with complaints of shortness of breath. Patient reports that she has been sick all week and was recently started on Vantin and steroids while at the nursing home. Pt admitted for acute on chronic respiratory failure sec to Acute HFpEF exacerbation with likely with Superimposed bacterial Pneumonia in setting of RSV, Severe pulm HTN, new Afib..CT scan which also showed pneumonia and pulmonary HTN. S/P [facility] 12/29 with mild LAD disease,pulm htn. Pt was wean off from BIPAP,now on NC. and was given IV lasix,Started on heparin drip and changed to Xarelto after cath.    New afib RVR    - Increase loprssor 150mg q12hr  - Xarelto 15 mg qd per renal function  --EP consulted, no DCCV given resp issues  - F/U EP rec  - TTE W or WO Ultrasound Enhancing Agent (12.18.23)EF 60 to 65%. There is moderate (grade 2) left ventricular diastolic dysfunction, with normal filling pressure.          # Acute on chronic respiratory failure  # Acute HFpEF exacerbation   # Superimposed bacterial Pneumonia in setting of RSV  # Severe pulm HTN  - so2 3.5 L.home 4 L NC  - CT chest reads worsening middle lobe PNA  - sputum clx ordered for last several days, pt unable to bring up sputum  - repeat MRSA PCR negative  - ID consulted, WBC likely reactive to steroids  - antibiotics stopped on 12/28, WBC trended down  - pt high risk for recurrent aspiration  - repeat blood clx NGTD  - tapering steroids   - levalbuterol and ipratropium q6hrs, symbicort BID   - IV Lasix on hold for JACQUES  - BIPAP qhs  - pulm to f/u regarding severe pulm HTN   --S/p [facility] mILD LAD disease/ Severe pulmonary HTN/ LVEDP stable  -F/U CARDIOLOGY REC    # previous suspicion for PE/ DVT on admission  - Patient has empirically been treated for PE/DVT since August  - No scans noted to have PE/DVT  - duplex lower ext to without dvt   - d dimer negative    # JACQUES  - likely combination of pre-renal and ATN  - lasix on hold  - will pre-hydrate before and after cath  - nephrology consulted  - trend BMP    #Chronic pain  - continue home Pain meds of Oxycodone 60 mg q12h and Oxycodone 5mg q6h PRN    # HLD  - Atorvastatin for rosuvastatin    # COPD  - tapering steroids   - Symbicort, Spiriva, albuterol  - pulm following     #DM  #steroid induced hyperglycemia  -High BG  -A1C 8.3  - lantus 40 u bid  -increase 38 u meal coverage  -lispro sliding scale  -f/u endocrine rec    DVT prophylaxis: Xarelto   Dispo: pending cath and PT eval  Plan of care dw pt and son at bed side.   69 yo female with HTN, HLD, DM2, HFpEF, CKD III, DVT, Uterine CA, COPD on home o2 who presented with complaints of shortness of breath. Patient reports that she has been sick all week and was recently started on Vantin and steroids while at the nursing home. Pt admitted for acute on chronic respiratory failure sec to Acute HFpEF exacerbation with likely with Superimposed bacterial Pneumonia in setting of RSV, Severe pulm HTN, new Afib..CT scan which also showed pneumonia and pulmonary HTN. S/P [facility] 12/29 with mild LAD disease,pulm htn. Pt was wean off from BIPAP,now on NC. and was given IV lasix,Started on heparin drip and changed to Xarelto after cath.    New afib RVR    - Increase loprssor 150mg q12hr  - Xarelto 15 mg qd per renal function  --EP consulted, no DCCV given resp issues  - F/U EP rec  - TTE W or WO Ultrasound Enhancing Agent (12.18.23)EF 60 to 65%. There is moderate (grade 2) left ventricular diastolic dysfunction, with normal filling pressure.          # Acute on chronic respiratory failure  # Acute HFpEF exacerbation   # Superimposed bacterial Pneumonia in setting of RSV  # Severe pulm HTN  - so2 3.5 L.home 4 L NC  - CT chest reads worsening middle lobe PNA  - sputum clx ordered for last several days, pt unable to bring up sputum  - repeat MRSA PCR negative  - ID consulted, WBC likely reactive to steroids  - antibiotics stopped on 12/28, WBC trended down  - pt high risk for recurrent aspiration  - repeat blood clx NGTD  - tapering steroids   - levalbuterol and ipratropium q6hrs, symbicort BID   - IV Lasix on hold for JACQUES  - BIPAP qhs  - pulm to f/u regarding severe pulm HTN   --S/p [facility] mILD LAD disease/ Severe pulmonary HTN/ LVEDP stable  -F/U CARDIOLOGY REC    # previous suspicion for PE/ DVT on admission  - Patient has empirically been treated for PE/DVT since August  - No scans noted to have PE/DVT  - duplex lower ext to without dvt   - d dimer negative    # JACQUES  - likely combination of pre-renal and ATN  - lasix on hold  - will pre-hydrate before and after cath  - nephrology consulted  - trend BMP    #Chronic pain  - continue home Pain meds of Oxycodone 60 mg q12h and Oxycodone 5mg q6h PRN    # HLD  - Atorvastatin for rosuvastatin    # COPD  - tapering steroids   - Symbicort, Spiriva, albuterol  - pulm following     #DM  #steroid induced hyperglycemia  -High BG  -A1C 8.3  - lantus 40 u bid  -increase 38 u meal coverage  -lispro sliding scale  -f/u endocrine rec    DVT prophylaxis: Xarelto   Dispo: pending cath and PT eval  Plan of care dw pt and son at bed side.

## 2023-12-30 NOTE — PROGRESS NOTE ADULT - PROBLEM SELECTOR PLAN 1
HF with pEF  TTE on 12/18/23 with  Left ventricular wall thickness is normal. Left ventricular systolic function is normal with an ejection fraction visually estimated at 60 to 65%. There is moderate (grade 2) left ventricular diastolic dysfunction, with normal filling pressure  S/P diagnostic cath yesterday revealed mild CAD   Patient  appears euvolemic, diuresing well  -C/W Torsemide  -Start metoprolol tartrate 150mg po bid for better control of AFib  -Strict I/O  -DASH Diet  -Fluid restriction to 1L /day  - C/W tele monitoring

## 2023-12-30 NOTE — PROGRESS NOTE ADULT - ASSESSMENT
68 year old female with PMH of morbidly obesity (500+lbs), DM, HTN, HLD, asthma, HFpEF, chronic hypoxic respiratory failure on 4L home oxygen, CKD, spinal stenosis (cervical spine + lumbar spine) and hx of DVT presents with worsening SOB. . Nephrology is consulted for worsening JACQUES on CKD.     JACQUES on  CKD stage III renal function remains stable  -Baseline creatinine is ~1.6- 2.0 mg/dL in Oct 2022 (as per [facility])  -On admission, creatinine was1.4  - UA bland   - No hydro on CT 11/24/23     Renal function near baseline  s/p cardiac cath  premedicated    Acute on chronic HFpEF, NYHA III, ACC C  Bacterial pneumonia  Pulmonary HTN, Severe ( PASP 70 mmHg)  Pt hypervolemic on exam  Continue diuretics/ permissive azotemia  Component of elevated APOLONIA related to steroids  cont Torsemide 20 daily    AM labs will follow 68 year old female with PMH of morbidly obesity (500+lbs), DM, HTN, HLD, asthma, HFpEF, chronic hypoxic respiratory failure on 4L home oxygen, CKD, spinal stenosis (cervical spine + lumbar spine) and hx of DVT presents with worsening SOB. . Nephrology is consulted for worsening JACQUES on CKD.     JACQUES on  CKD stage III renal function remains stable  -Baseline creatinine is ~1.6- 2.0 mg/dL in Oct 2022 (as per [facility])  -On admission, creatinine was1.4  - UA bland   - No hydro on CT 11/24/23     Renal function near baseline  s/p cardiac cath  premedicated    Acute on chronic HFpEF, NYHA III, ACC C  Bacterial pneumonia  Pulmonary HTN, Severe ( PASP 70 mmHg)  Pt hypervolemic on exam  Continue diuretics/ permissive azotemia  Component of elevated APOLONIA related to steroids  cont Torsemide 20 daily    AM labs will follow

## 2023-12-30 NOTE — PROGRESS NOTE ADULT - PROBLEM SELECTOR PLAN 4
Bacterial PNA, RSV,   Off abx now  Afebrile  wbc trending down, likley recative leukocytosis secondary to being on steroids    Respiratory status optimized now  Management per primary team

## 2023-12-30 NOTE — PROGRESS NOTE ADULT - SUBJECTIVE AND OBJECTIVE BOX
Patient is a 68y old  Female who presents with a chief complaint of SOB (30 Dec 2023 11:11)    c/o sob on exertion.denies cough,fever,chill,chest pain  REVIEW OF SYSTEMS: All systems are reviewed and found to be negative except above    MEDICATIONS  (STANDING):  acetylcysteine  Oral Solution 1200 milliGRAM(s) Oral every 12 hours  acetylcysteine 20%  Inhalation 4 milliLiter(s) Inhalation daily  atorvastatin 80 milliGRAM(s) Oral at bedtime  budesonide  80 MICROgram(s)/formoterol 4.5 MICROgram(s) Inhaler 2 Puff(s) Inhalation two times a day  chlorhexidine 2% Cloths 1 Application(s) Topical <User Schedule>  clotrimazole 1% Cream 1 Application(s) Topical two times a day  dextrose 5%. 1000 milliLiter(s) (100 mL/Hr) IV Continuous <Continuous>  dextrose 5%. 1000 milliLiter(s) (50 mL/Hr) IV Continuous <Continuous>  dextrose 50% Injectable 25 Gram(s) IV Push once  dextrose 50% Injectable 12.5 Gram(s) IV Push once  dextrose 50% Injectable 25 Gram(s) IV Push once  famotidine    Tablet 20 milliGRAM(s) Oral daily  glucagon  Injectable 1 milliGRAM(s) IntraMuscular once  insulin glargine Injectable (LANTUS) 40 Unit(s) SubCutaneous at bedtime  insulin glargine Injectable (LANTUS) 40 Unit(s) SubCutaneous every morning  insulin lispro (ADMELOG) corrective regimen sliding scale   SubCutaneous three times a day before meals  insulin lispro Injectable (ADMELOG) 38 Unit(s) SubCutaneous three times a day before meals  ipratropium    for Nebulization 500 MICROGram(s) Nebulizer every 6 hours  lactulose Syrup 20 Gram(s) Oral daily  levalbuterol Inhalation 0.63 milliGRAM(s) Inhalation every 6 hours  metoprolol tartrate 150 milliGRAM(s) Oral two times a day  nystatin Powder 1 Application(s) Topical two times a day  polyethylene glycol 3350 17 Gram(s) Oral daily  rivaroxaban 15 milliGRAM(s) Oral daily  senna 2 Tablet(s) Oral at bedtime  torsemide 20 milliGRAM(s) Oral daily    MEDICATIONS  (PRN):  acetaminophen     Tablet .. 650 milliGRAM(s) Oral every 6 hours PRN Temp greater or equal to 38C (100.4F), Mild Pain (1 - 3)  albuterol    90 MICROgram(s) HFA Inhaler 2 Puff(s) Inhalation every 2 hours PRN Shortness of Breath and/or Wheezing  dextrose Oral Gel 15 Gram(s) Oral once PRN Blood Glucose LESS THAN 70 milliGRAM(s)/deciliter  diphenhydrAMINE 25 milliGRAM(s) Oral every 6 hours PRN Rash and/or Itching  hydrALAZINE Injectable 10 milliGRAM(s) IV Push every 4 hours PRN for systolic bp > 160  melatonin 3 milliGRAM(s) Oral at bedtime PRN Insomnia  metoprolol tartrate Injectable 5 milliGRAM(s) IV Push every 6 hours PRN heart rate sustaining greater than 130  ondansetron Injectable 4 milliGRAM(s) IV Push every 8 hours PRN Nausea and/or Vomiting  oxyCODONE    IR 5 milliGRAM(s) Oral every 6 hours PRN Moderate Pain (4 - 6)      CAPILLARY BLOOD GLUCOSE      POCT Blood Glucose.: 251 mg/dL (30 Dec 2023 12:10)  POCT Blood Glucose.: 241 mg/dL (30 Dec 2023 08:18)  POCT Blood Glucose.: 183 mg/dL (29 Dec 2023 22:25)    I&O's Summary    29 Dec 2023 07:01  -  30 Dec 2023 07:00  --------------------------------------------------------  IN: 460 mL / OUT: 2075 mL / NET: -1615 mL        PHYSICAL EXAM:  Vital Signs Last 24 Hrs  T(C): 36.5 (30 Dec 2023 08:00), Max: 36.9 (29 Dec 2023 22:10)  T(F): 97.7 (30 Dec 2023 08:00), Max: 98.5 (29 Dec 2023 22:10)  HR: 100 (30 Dec 2023 08:35) (94 - 142)  BP: 113/55 (30 Dec 2023 08:00) (100/53 - 141/60)  BP(mean): --  RR: 17 (30 Dec 2023 08:35) (17 - 20)  SpO2: 95% (30 Dec 2023 08:35) (94% - 98%)    Parameters below as of 30 Dec 2023 08:35  Patient On (Oxygen Delivery Method): nasal cannula  O2 Flow (L/min): 4      CONSTITUTIONAL: NAD,  EYES: PERRLA; conjunctiva and sclera clear  ENMT: Moist oral mucosa,   RESPIRATORY: Normal respiratory effort; mild cracakles to auscultation bilaterally  CARDIOVASCULAR:  normal S1 and S2, no murmur   EXTS: + lower extremity edema; Peripheral pulses are 2+ bilaterally  ABDOMEN: Nontender to palpation, normoactive bowel sounds, no rebound/guarding;   MUSCLOSKELETAL:   no joint swelling or tenderness to palpation  PSYCH: coop  NEUROLOGY: A+O to person, place, and time; CN 2-12 are intact and symmetric; no gross sensory deficits;       LABS:                        12.8   20.82 )-----------( 227      ( 30 Dec 2023 06:11 )             40.0     12-30    138  |  96  |  80.0<H>  ----------------------------<  259<H>  4.6   |  28.0  |  1.64<H>    Ca    9.3      30 Dec 2023 06:11      PTT - ( 30 Dec 2023 06:11 )  PTT:26.5 sec      Urinalysis Basic - ( 30 Dec 2023 06:11 )    Color: x / Appearance: x / SG: x / pH: x  Gluc: 259 mg/dL / Ketone: x  / Bili: x / Urobili: x   Blood: x / Protein: x / Nitrite: x   Leuk Esterase: x / RBC: x / WBC x   Sq Epi: x / Non Sq Epi: x / Bacteria: x          RADIOLOGY & ADDITIONAL TESTS:  Results Reviewed:   < from: TTE W or WO Ultrasound Enhancing Agent (12.18.23 @ 13:36) >     Left Ventricle:  The left ventricle was not well visualized. The left ventricular cavity is normal. Left ventricular wall thickness is normal. Left ventricular systolic function is normal with an ejection fraction visually estimated at 60 to 65%. There is moderate (grade 2) left ventricular diastolic dysfunction, with normal filling pressure.    < end of copied text >  < from: TTE W or WO Ultrasound Enhancing Agent (12.18.23 @ 13:36) >     Left Ventricle:  The left ventricle was not well visualized. The left ventricular cavity is normal. Left ventricular wall thickness is normal. Left ventricular systolic function is normal with an ejection fraction visually estimated at 60 to 65%. There is moderate (grade 2) left ventricular diastolic dysfunction, with normal filling pressure.    < end of copied text >

## 2023-12-30 NOTE — PROGRESS NOTE ADULT - SUBJECTIVE AND OBJECTIVE BOX
NEPHROLOGY INTERVAL HPI/OVERNIGHT EVENTS:    Examined earlier  No distress  Cath yest      MEDICATIONS  (STANDING):  acetylcysteine  Oral Solution 1200 milliGRAM(s) Oral every 12 hours  acetylcysteine 20%  Inhalation 4 milliLiter(s) Inhalation daily  atorvastatin 80 milliGRAM(s) Oral at bedtime  budesonide  80 MICROgram(s)/formoterol 4.5 MICROgram(s) Inhaler 2 Puff(s) Inhalation two times a day  chlorhexidine 2% Cloths 1 Application(s) Topical <User Schedule>  clotrimazole 1% Cream 1 Application(s) Topical two times a day  dextrose 5%. 1000 milliLiter(s) (50 mL/Hr) IV Continuous <Continuous>  dextrose 5%. 1000 milliLiter(s) (100 mL/Hr) IV Continuous <Continuous>  dextrose 50% Injectable 25 Gram(s) IV Push once  dextrose 50% Injectable 12.5 Gram(s) IV Push once  dextrose 50% Injectable 25 Gram(s) IV Push once  famotidine    Tablet 20 milliGRAM(s) Oral daily  glucagon  Injectable 1 milliGRAM(s) IntraMuscular once  insulin glargine Injectable (LANTUS) 40 Unit(s) SubCutaneous at bedtime  insulin glargine Injectable (LANTUS) 40 Unit(s) SubCutaneous every morning  insulin lispro (ADMELOG) corrective regimen sliding scale   SubCutaneous three times a day before meals  insulin lispro Injectable (ADMELOG) 38 Unit(s) SubCutaneous three times a day before meals  ipratropium    for Nebulization 500 MICROGram(s) Nebulizer every 6 hours  lactulose Syrup 20 Gram(s) Oral daily  levalbuterol Inhalation 0.63 milliGRAM(s) Inhalation every 6 hours  metoprolol tartrate 150 milliGRAM(s) Oral two times a day  nystatin Powder 1 Application(s) Topical two times a day  polyethylene glycol 3350 17 Gram(s) Oral daily  rivaroxaban 15 milliGRAM(s) Oral daily  senna 2 Tablet(s) Oral at bedtime  torsemide 20 milliGRAM(s) Oral daily    MEDICATIONS  (PRN):  acetaminophen     Tablet .. 650 milliGRAM(s) Oral every 6 hours PRN Temp greater or equal to 38C (100.4F), Mild Pain (1 - 3)  ALPRAZolam 0.25 milliGRAM(s) Oral every 8 hours PRN for anxiety/Sleep  dextrose Oral Gel 15 Gram(s) Oral once PRN Blood Glucose LESS THAN 70 milliGRAM(s)/deciliter  diphenhydrAMINE 25 milliGRAM(s) Oral every 6 hours PRN Rash and/or Itching  hydrALAZINE Injectable 10 milliGRAM(s) IV Push every 4 hours PRN for systolic bp > 160  melatonin 3 milliGRAM(s) Oral at bedtime PRN Insomnia  metoprolol tartrate Injectable 5 milliGRAM(s) IV Push every 6 hours PRN heart rate sustaining greater than 130  ondansetron Injectable 4 milliGRAM(s) IV Push every 8 hours PRN Nausea and/or Vomiting  oxyCODONE    IR 5 milliGRAM(s) Oral every 6 hours PRN Moderate Pain (4 - 6)      Allergies    wool- rash, itch (Other)  adhesives (Rash)  latex (Rash)  Bactrim (Flushing)    Intolerances        Vital Signs Last 24 Hrs  T(C): 36.3 (30 Dec 2023 15:50), Max: 36.9 (29 Dec 2023 22:10)  T(F): 97.4 (30 Dec 2023 15:50), Max: 98.5 (29 Dec 2023 22:10)  HR: 104 (30 Dec 2023 15:50) (94 - 122)  BP: 120/46 (30 Dec 2023 15:50) (100/53 - 141/60)  BP(mean): --  RR: 17 (30 Dec 2023 08:35) (17 - 20)  SpO2: 98% (30 Dec 2023 15:50) (94% - 98%)    Parameters below as of 30 Dec 2023 15:50  Patient On (Oxygen Delivery Method): nasal cannula  O2 Flow (L/min): 4    Daily     Daily     PHYSICAL EXAM:  Gen: obese woman  	HEENT: +NC  	Pulm: decreased bs  	CV: RRR, S1S2; no rub  	Back: No spinal or CVA tenderness; no sacral edema  	Abd: +BS, soft, nontender/nondistended  	: No suprapubic tenderness  	LE: Warm, leg pitting edema       LABS:                        12.8   20.82 )-----------( 227      ( 30 Dec 2023 06:11 )             40.0     12-30    138  |  96  |  80.0<H>  ----------------------------<  259<H>  4.6   |  28.0  |  1.64<H>    Ca    9.3      30 Dec 2023 06:11      PTT - ( 30 Dec 2023 06:11 )  PTT:26.5 sec  Urinalysis Basic - ( 30 Dec 2023 06:11 )    Color: x / Appearance: x / SG: x / pH: x  Gluc: 259 mg/dL / Ketone: x  / Bili: x / Urobili: x   Blood: x / Protein: x / Nitrite: x   Leuk Esterase: x / RBC: x / WBC x   Sq Epi: x / Non Sq Epi: x / Bacteria: x              RADIOLOGY & ADDITIONAL TESTS:

## 2023-12-30 NOTE — PROGRESS NOTE ADULT - SUBJECTIVE AND OBJECTIVE BOX
PULMONARY PROGRESS NOTE      AGUEDA LUISRAYMUNDO-[id number]    Patient is a 68y old  Female who presents with a chief complaint of SOB (30 Dec 2023 16:43)      BRIEF HOSPITAL COURSE: * resp failure  RSV**    Events last 24 hours: **  -alert and conversant  feels a little better  currently on  4l/min  oxygen   nc  afebirle    -still on lasix per cardiology  -had episode of chest pain  12/30/23  5/10    -tolerated cath 12/29      LHC  mild LAD  dz  severe PULM  HTN   LVEDP stable        REVIEW OF SYSTEMS     CONSTITUTIONAL   no fevers  no loss of appetite  no weight loss   HEENT  no sore throat   no ringing in ears  NECK   no pain   RESPIRATORY  see HPI   CARDIOVASCULAR  no chest  pain no palpitations   GASTROINTESTINAL no vomiting  no diarrhea    no   gerd   MUSCULOSKELETAL  no joint pains    no  back pain   SKIN   no rash   no itchiness   GENITOURINARY  no dysuria   HEME    no bleeding or bruising   ENDOCRINE     no   warmth   no  sweating   no  cold intolerance   NEUROLOGIC  no tremors  no seizures  no    weakness    PSYCHIATRIC   no mood disorder    no delirium   *    PAST MEDICAL & SURGICAL HISTORY:  Diabetes Mellitus Type II      Endometrial Hyperplasia      Cervical Stenosis of Spine      Spinal Stenosis, Lumbar      Deep Vein Thrombosis (DVT)  Left leg, 2004, treated and resolved      Dyslipidemia      Cataract      Morbid Obesity      Vitamin D deficiency      Insomnia      CKD (chronic kidney disease)  ~ III      Congestive heart failure  ~ HFpEF      Uterine cancer      Asthma      On home oxygen therapy      Gait difficulty  ~ u ses walker      Cataract extracted with lens implant 1998  Right      C Section 1994      Cholecystectomy/appendectomy @ age 25      D&C x2 1980's      D&C 2008  hysteroscopy, endometrial hyperplasia, 2009      Cervical Spinal Stenosis surgery x2 (01/2002, 7/2002)      Tonsillectomy as a child      Endometrial biopsy 12/02/09      H/O laser iridotomy  left eye, 2016      H/O colonoscopy  1998      S/P total abdominal hysterectomy and bilateral salpingo-oophorectomy      S/P appendectomy      S/P cholecystectomy            Medications:    hydrALAZINE Injectable 10 milliGRAM(s) IV Push every 4 hours PRN  metoprolol tartrate 150 milliGRAM(s) Oral two times a day  metoprolol tartrate Injectable 5 milliGRAM(s) IV Push every 6 hours PRN  torsemide 20 milliGRAM(s) Oral daily    acetylcysteine 20%  Inhalation 4 milliLiter(s) Inhalation daily  budesonide  80 MICROgram(s)/formoterol 4.5 MICROgram(s) Inhaler 2 Puff(s) Inhalation two times a day  ipratropium    for Nebulization 500 MICROGram(s) Nebulizer every 6 hours  levalbuterol Inhalation 0.63 milliGRAM(s) Inhalation every 6 hours    acetaminophen     Tablet .. 650 milliGRAM(s) Oral every 6 hours PRN  ALPRAZolam 0.25 milliGRAM(s) Oral every 8 hours PRN  diphenhydrAMINE 25 milliGRAM(s) Oral every 6 hours PRN  melatonin 3 milliGRAM(s) Oral at bedtime PRN  ondansetron Injectable 4 milliGRAM(s) IV Push every 8 hours PRN  oxyCODONE    IR 5 milliGRAM(s) Oral every 6 hours PRN      rivaroxaban 15 milliGRAM(s) Oral daily    famotidine    Tablet 20 milliGRAM(s) Oral daily  lactulose Syrup 20 Gram(s) Oral daily  polyethylene glycol 3350 17 Gram(s) Oral daily  senna 2 Tablet(s) Oral at bedtime      atorvastatin 80 milliGRAM(s) Oral at bedtime  dextrose 50% Injectable 25 Gram(s) IV Push once  dextrose 50% Injectable 12.5 Gram(s) IV Push once  dextrose 50% Injectable 25 Gram(s) IV Push once  dextrose Oral Gel 15 Gram(s) Oral once PRN  glucagon  Injectable 1 milliGRAM(s) IntraMuscular once  insulin glargine Injectable (LANTUS) 40 Unit(s) SubCutaneous every morning  insulin glargine Injectable (LANTUS) 40 Unit(s) SubCutaneous at bedtime  insulin lispro (ADMELOG) corrective regimen sliding scale   SubCutaneous three times a day before meals  insulin lispro Injectable (ADMELOG) 38 Unit(s) SubCutaneous three times a day before meals    dextrose 5%. 1000 milliLiter(s) IV Continuous <Continuous>  dextrose 5%. 1000 milliLiter(s) IV Continuous <Continuous>      chlorhexidine 2% Cloths 1 Application(s) Topical <User Schedule>  clotrimazole 1% Cream 1 Application(s) Topical two times a day  nystatin Powder 1 Application(s) Topical two times a day            ICU Vital Signs Last 24 Hrs  T(C): 36.3 (30 Dec 2023 15:50), Max: 36.9 (29 Dec 2023 22:10)  T(F): 97.4 (30 Dec 2023 15:50), Max: 98.5 (29 Dec 2023 22:10)  HR: 112 (30 Dec 2023 17:14) (98 - 112)  BP: 108/48 (30 Dec 2023 17:14) (100/53 - 120/46)  BP(mean): --  ABP: --  ABP(mean): --  RR: 17 (30 Dec 2023 08:35) (17 - 18)  SpO2: 98% (30 Dec 2023 15:50) (94% - 98%)    O2 Parameters below as of 30 Dec 2023 15:50  Patient On (Oxygen Delivery Method): nasal cannula  O2 Flow (L/min): 4              I&O's Detail    29 Dec 2023 07:01  -  30 Dec 2023 07:00  --------------------------------------------------------  IN:    Heparin Infusion: 60 mL    Oral Fluid: 400 mL  Total IN: 460 mL    OUT:    Indwelling Catheter - Urethral (mL): 2075 mL  Total OUT: 2075 mL    Total NET: -1615 mL      30 Dec 2023 07:01  -  30 Dec 2023 20:01  --------------------------------------------------------  IN:  Total IN: 0 mL    OUT:    Indwelling Catheter - Urethral (mL): 1900 mL  Total OUT: 1900 mL    Total NET: -1900 mL            LABS:                        12.8   20.82 )-----------( 227      ( 30 Dec 2023 06:11 )             40.0     12-30    138  |  96  |  80.0<H>  ----------------------------<  259<H>  4.6   |  28.0  |  1.64<H>    Ca    9.3      30 Dec 2023 06:11            CAPILLARY BLOOD GLUCOSE      POCT Blood Glucose.: 144 mg/dL (30 Dec 2023 18:46)    PTT - ( 30 Dec 2023 06:11 )  PTT:26.5 sec  Urinalysis Basic - ( 30 Dec 2023 06:11 )    Color: x / Appearance: x / SG: x / pH: x  Gluc: 259 mg/dL / Ketone: x  / Bili: x / Urobili: x   Blood: x / Protein: x / Nitrite: x   Leuk Esterase: x / RBC: x / WBC x   Sq Epi: x / Non Sq Epi: x / Bacteria: x      CULTURES:  Rapid RVP Result: NotDetec (12-30 @ 16:30)  Culture Results:   No growth at 4 days (12-26 @ 09:31)  Culture Results:   No growth at 4 days (12-26 @ 09:21)      Physical Examination:    General: No acute distress.   in bds upset a little     HEENT: Pupils equal, reactive to light.  Symmetric. nothrush     PULM: Clear to auscultation bilaterally, no significant sputum production    NECK: Supple, no lymphadenopathy, trachea midline    CVS: Regular rate and rhythm, no murmurs, rubs, or gallops    ABD: Soft, nondistended, nontender, normoactive bowel sounds, no masses    EXT:  edema     gu  urine bag   clear   SKIN: Warm and well perfused, no rashes noted.    NEURO: Alert, oriented, interactive, nonfocal    DEVICES:     RADIOLOGY: *PROCEDURE DATE:  12/26/2023          INTERPRETATION:  HISTORY: Acute hypoxic respiratory failure. Worsening   WBC. Worsening pneumonia.    EXAMINATION: CT CHEST was performed without IV contrast.    COMPARISON: 12/16/2023 CT chest.    FINDINGS:    AIRWAYS, LUNGS, PLEURA: Interval worsening of right middle lobe   parenchymal consolidation with volume loss. Mild atelectasis in the   lingula and left base.    Trachea and mainstem bronchipatent. No pleural effusion.    MEDIASTINUM: Normal heart size. No pericardial effusion. Thoracic aorta   normal caliber.  No large mediastinal lymph nodes.    IMAGED ABDOMEN: Cholecystectomy. Right adrenal nodule as on 7/28/2020 CT   chest.    SOFT TISSUES: Unremarkable.    BONES: Unremarkable.      IMPRESSION:.    Interval worsening of right middle lobe consolidation, likely pneumonia.    --- End of Report ---            KHAI SEPULVEDA MD; Attending Radiologist  This document has been electronically signed. Dec 26 2023 10:28AM  **    CRITICAL CARE TIME SPENT: ***   PULMONARY PROGRESS NOTE      AGUEDA LUISRAYMUNDO-[id number]    Patient is a 68y old  Female who presents with a chief complaint of SOB (30 Dec 2023 16:43)      BRIEF HOSPITAL COURSE: * resp failure  RSV**    Events last 24 hours: **  -alert and conversant  feels a little better  currently on  4l/min  oxygen   nc  afebirle    -still on lasix per cardiology  -had episode of chest pain  12/30/23  5/10    -tolerated cath 12/29      LHC  mild LAD  dz  severe PULM  HTN   LVEDP stable        REVIEW OF SYSTEMS     CONSTITUTIONAL   no fevers  no loss of appetite  no weight loss   HEENT  no sore throat   no ringing in ears  NECK   no pain   RESPIRATORY  see HPI   CARDIOVASCULAR  no chest  pain no palpitations   GASTROINTESTINAL no vomiting  no diarrhea    no   gerd   MUSCULOSKELETAL  no joint pains    no  back pain   SKIN   no rash   no itchiness   GENITOURINARY  no dysuria   HEME    no bleeding or bruising   ENDOCRINE     no   warmth   no  sweating   no  cold intolerance   NEUROLOGIC  no tremors  no seizures  no    weakness    PSYCHIATRIC   no mood disorder    no delirium   *    PAST MEDICAL & SURGICAL HISTORY:  Diabetes Mellitus Type II      Endometrial Hyperplasia      Cervical Stenosis of Spine      Spinal Stenosis, Lumbar      Deep Vein Thrombosis (DVT)  Left leg, 2004, treated and resolved      Dyslipidemia      Cataract      Morbid Obesity      Vitamin D deficiency      Insomnia      CKD (chronic kidney disease)  ~ III      Congestive heart failure  ~ HFpEF      Uterine cancer      Asthma      On home oxygen therapy      Gait difficulty  ~ u ses walker      Cataract extracted with lens implant 1998  Right      C Section 1994      Cholecystectomy/appendectomy @ age 25      D&C x2 1980's      D&C 2008  hysteroscopy, endometrial hyperplasia, 2009      Cervical Spinal Stenosis surgery x2 (01/2002, 7/2002)      Tonsillectomy as a child      Endometrial biopsy 12/02/09      H/O laser iridotomy  left eye, 2016      H/O colonoscopy  1998      S/P total abdominal hysterectomy and bilateral salpingo-oophorectomy      S/P appendectomy      S/P cholecystectomy            Medications:    hydrALAZINE Injectable 10 milliGRAM(s) IV Push every 4 hours PRN  metoprolol tartrate 150 milliGRAM(s) Oral two times a day  metoprolol tartrate Injectable 5 milliGRAM(s) IV Push every 6 hours PRN  torsemide 20 milliGRAM(s) Oral daily    acetylcysteine 20%  Inhalation 4 milliLiter(s) Inhalation daily  budesonide  80 MICROgram(s)/formoterol 4.5 MICROgram(s) Inhaler 2 Puff(s) Inhalation two times a day  ipratropium    for Nebulization 500 MICROGram(s) Nebulizer every 6 hours  levalbuterol Inhalation 0.63 milliGRAM(s) Inhalation every 6 hours    acetaminophen     Tablet .. 650 milliGRAM(s) Oral every 6 hours PRN  ALPRAZolam 0.25 milliGRAM(s) Oral every 8 hours PRN  diphenhydrAMINE 25 milliGRAM(s) Oral every 6 hours PRN  melatonin 3 milliGRAM(s) Oral at bedtime PRN  ondansetron Injectable 4 milliGRAM(s) IV Push every 8 hours PRN  oxyCODONE    IR 5 milliGRAM(s) Oral every 6 hours PRN      rivaroxaban 15 milliGRAM(s) Oral daily    famotidine    Tablet 20 milliGRAM(s) Oral daily  lactulose Syrup 20 Gram(s) Oral daily  polyethylene glycol 3350 17 Gram(s) Oral daily  senna 2 Tablet(s) Oral at bedtime      atorvastatin 80 milliGRAM(s) Oral at bedtime  dextrose 50% Injectable 25 Gram(s) IV Push once  dextrose 50% Injectable 12.5 Gram(s) IV Push once  dextrose 50% Injectable 25 Gram(s) IV Push once  dextrose Oral Gel 15 Gram(s) Oral once PRN  glucagon  Injectable 1 milliGRAM(s) IntraMuscular once  insulin glargine Injectable (LANTUS) 40 Unit(s) SubCutaneous every morning  insulin glargine Injectable (LANTUS) 40 Unit(s) SubCutaneous at bedtime  insulin lispro (ADMELOG) corrective regimen sliding scale   SubCutaneous three times a day before meals  insulin lispro Injectable (ADMELOG) 38 Unit(s) SubCutaneous three times a day before meals    dextrose 5%. 1000 milliLiter(s) IV Continuous <Continuous>  dextrose 5%. 1000 milliLiter(s) IV Continuous <Continuous>      chlorhexidine 2% Cloths 1 Application(s) Topical <User Schedule>  clotrimazole 1% Cream 1 Application(s) Topical two times a day  nystatin Powder 1 Application(s) Topical two times a day            ICU Vital Signs Last 24 Hrs  T(C): 36.3 (30 Dec 2023 15:50), Max: 36.9 (29 Dec 2023 22:10)  T(F): 97.4 (30 Dec 2023 15:50), Max: 98.5 (29 Dec 2023 22:10)  HR: 112 (30 Dec 2023 17:14) (98 - 112)  BP: 108/48 (30 Dec 2023 17:14) (100/53 - 120/46)  BP(mean): --  ABP: --  ABP(mean): --  RR: 17 (30 Dec 2023 08:35) (17 - 18)  SpO2: 98% (30 Dec 2023 15:50) (94% - 98%)    O2 Parameters below as of 30 Dec 2023 15:50  Patient On (Oxygen Delivery Method): nasal cannula  O2 Flow (L/min): 4              I&O's Detail    29 Dec 2023 07:01  -  30 Dec 2023 07:00  --------------------------------------------------------  IN:    Heparin Infusion: 60 mL    Oral Fluid: 400 mL  Total IN: 460 mL    OUT:    Indwelling Catheter - Urethral (mL): 2075 mL  Total OUT: 2075 mL    Total NET: -1615 mL      30 Dec 2023 07:01  -  30 Dec 2023 20:01  --------------------------------------------------------  IN:  Total IN: 0 mL    OUT:    Indwelling Catheter - Urethral (mL): 1900 mL  Total OUT: 1900 mL    Total NET: -1900 mL            LABS:                        12.8   20.82 )-----------( 227      ( 30 Dec 2023 06:11 )             40.0     12-30    138  |  96  |  80.0<H>  ----------------------------<  259<H>  4.6   |  28.0  |  1.64<H>    Ca    9.3      30 Dec 2023 06:11            CAPILLARY BLOOD GLUCOSE      POCT Blood Glucose.: 144 mg/dL (30 Dec 2023 18:46)    PTT - ( 30 Dec 2023 06:11 )  PTT:26.5 sec  Urinalysis Basic - ( 30 Dec 2023 06:11 )    Color: x / Appearance: x / SG: x / pH: x  Gluc: 259 mg/dL / Ketone: x  / Bili: x / Urobili: x   Blood: x / Protein: x / Nitrite: x   Leuk Esterase: x / RBC: x / WBC x   Sq Epi: x / Non Sq Epi: x / Bacteria: x      CULTURES:  Rapid RVP Result: NotDetec (12-30 @ 16:30)  Culture Results:   No growth at 4 days (12-26 @ 09:31)  Culture Results:   No growth at 4 days (12-26 @ 09:21)      Physical Examination:    General: No acute distress.   in bds upset a little     HEENT: Pupils equal, reactive to light.  Symmetric. nothrush     PULM: Clear to auscultation bilaterally, no significant sputum production    NECK: Supple, no lymphadenopathy, trachea midline    CVS: Regular rate and rhythm, no murmurs, rubs, or gallops    ABD: Soft, nondistended, nontender, normoactive bowel sounds, no masses    EXT:  edema     gu  urine bag   clear   SKIN: Warm and well perfused, no rashes noted.    NEURO: Alert, oriented, interactive, nonfocal    DEVICES:     RADIOLOGY: *PROCEDURE DATE:  12/26/2023          INTERPRETATION:  HISTORY: Acute hypoxic respiratory failure. Worsening   WBC. Worsening pneumonia.    EXAMINATION: CT CHEST was performed without IV contrast.    COMPARISON: 12/16/2023 CT chest.    FINDINGS:    AIRWAYS, LUNGS, PLEURA: Interval worsening of right middle lobe   parenchymal consolidation with volume loss. Mild atelectasis in the   lingula and left base.    Trachea and mainstem bronchipatent. No pleural effusion.    MEDIASTINUM: Normal heart size. No pericardial effusion. Thoracic aorta   normal caliber.  No large mediastinal lymph nodes.    IMAGED ABDOMEN: Cholecystectomy. Right adrenal nodule as on 7/28/2020 CT   chest.    SOFT TISSUES: Unremarkable.    BONES: Unremarkable.      IMPRESSION:.    Interval worsening of right middle lobe consolidation, likely pneumonia.    --- End of Report ---            KHAI SEPULVEDA MD; Attending Radiologist  This document has been electronically signed. Dec 26 2023 10:28AM  **    CRITICAL CARE TIME SPENT: ***

## 2023-12-30 NOTE — CHART NOTE - NSCHARTNOTEFT_GEN_A_CORE
Cardiology NP note    Call received from Medicine ACP stating  patient had brief episode of CP, associated with diaphoresis  reviewed 12 lead EKG afib TWI 1, II, AVF, no b=new ST/T changes    Evaluated the patent at bedside, patient states that she had brief episode of left mid chest pain, resolved, states that her cough got worsened now  Noted pt with productive cough now  Uncontrolled hyperglycemia, normal anon gap  ekg unchanged from prior    unlikely cardiac CP  Suspicion foe viral syndrome / reoccurring PNA  Limited  TTE TO r/o pericardial effusion  CXR, Viral panel  Mucomyst, Nebs to expectorate secretions, bronchodilation  Monitor for hypoxia  Primary team to follow  Discussed with Medicine ACP, Macarena Valenzuela, [facility]  Cardiology  Available via teams Cardiology NP note    Call received from Medicine ACP stating  patient had brief episode of CP, associated with diaphoresis  reviewed 12 lead EKG afib TWI 1, II, AVF, no b=new ST/T changes    Evaluated the patent at bedside, patient states that she had brief episode of left mid chest pain, resolved, states that her cough got worsened now  Noted pt with productive cough now  Uncontrolled hyperglycemia, normal anon gap  ekg unchanged from prior    unlikely cardiac CP  Suspicion foe viral syndrome / reoccurring PNA  Limited  TTE TO r/o pericardial effusion  CXR, Viral panel  Mucomyst, Nebs to expectorate secretions, bronchodilation  Monitor for hypoxia  Primary team to follow  Discussed with Medicine ACP, Macarena Valenzuela, [facility]  Cardiology  Available via teams

## 2023-12-30 NOTE — PROGRESS NOTE ADULT - ASSESSMENT
69 y/o female with hx of HFpEF (EF 55-60% 8/2023), on home O2 4L, pulmonary HTN, morbid obesity, asthma, CKD3, IDDM2, uterine cancer s/p MEGAN, DVT LLE (2004), chronic leg edema 2/2 venous stasis, spinal stenosis with chronic back pain who presents with midsternal chest pressure and shortness of breath. Patient has had multiple hospitalizations since September 2023. She had hospitalization 8/30-9/13/23 for acute on chronic decompensated HFpEF with possible presumed PE and severe pulmonary hypertension (PASP of 70 mmHg). During that admission, she was profoundly volume overloaded requiring IV diuresis; elevated DDimer 407; US lower extremity edema: Markedly limited visualization. Unable to perform compression. No evidence of deep venous thrombosis in either lower extremity. +trop .09-> .08-> .06. TTE during that time: LVEF 55-60%; moderate RVE and moderately reduced RVSF; PASP 70.9mmHg. Due to patient's weight and renal function, ischemic testing cath/ NST was precluded. Patient was ultimately discharged home on oral diuresis and eliquis 5mg PO BID.   Patient had admission to [facility] 9/18-9/29/23 for complaints of rectal pain; constipation and no reported BM x 2 weeks. CT scan consistent for  constipation Rectal pain may also be d/t anal fissure vs thrombosed hemorrhoids, started on bowel regimen and nitroglycerin  ointment rectally BID for possible fissure vs thrombosed hemorrhoids. Colorectal surgery consulted & rec outpatient follow up. During her stay, she had an acute UTI treated with Ceftriaxone x 3 days. Patient was recommended to being discharged to [facility] but refused was ultimately discharged home on 9/29/23.   Patient had readmission back to [facility] on 9/29/23 for b/l LE weakness, legs giving out while walking and almost falling. She was admitted for [facility] placement and ultimately discharged on 10/4/23.   he presented to [facility] 11/24/23 for severe rectal pain with defecation evaluated by colorectal surgery in which exam was consistent with anal fissure. CT A/P w/o acute pathology; no acute surgical intervention; SITZ bath TID, Miralax/Senna, Lidocaine Cr post BM, Nitroglycerin ointment BID. Pt also with new wound anterior right ankle area with no signs of infection and no signs of cellulitis.  Seen by podiatry, Rx betadine with gauze and yasir right ankle. Patient was ultimately discharged home on 12/1.   On  12/16/23 patient presents from [facility]. She states she had multiple episodes of non radiating mid chest pressure and shortness of breath at rest.    Patient reports that her dyspnea just worsened and she told the nursing home to send her in for an evaluation. While in the ED, patient was placed on BIPAP and was given IV lasix with some improvement. RVP then results as RSV +. Patient had a CT scan which also showed pneumonia and pulmonary HTN. hospital course C/B New Afib,  on lopressor 75mg po q8 hourly and lopressor 5mg ivp PRN, echo w/ preserved EF, - c/w heparin drip for [id number] 6   pt remains afebrile and resp status improving, weaned off from BIPAP to now on NC , BIPAP HS    CT chest reads worsening middle lobe PNA, WBC likely reactive to steroids, S/P Abx, - repeat blood clx NGTD  Patient s/p diagnostic  Right and  left heart cath yesterday, revealed mild irregularities of LAD, RCA and Circumflex  Right heart pressures associated with severe pulmonary HTN  Patient with significant improvement in SOb today, exndorses feeling much better, good apetite, and denies HA, Dizziness, sob,CP, Palpitations.   69 y/o female with hx of HFpEF (EF 55-60% 8/2023), on home O2 4L, pulmonary HTN, morbid obesity, asthma, CKD3, IDDM2, uterine cancer s/p MEGAN, DVT LLE (2004), chronic leg edema 2/2 venous stasis, spinal stenosis with chronic back pain who presents with midsternal chest pressure and shortness of breath. Patient has had multiple hospitalizations since September 2023. She had hospitalization 8/30-9/13/23 for acute on chronic decompensated HFpEF with possible presumed PE and severe pulmonary hypertension (PASP of 70 mmHg). During that admission, she was profoundly volume overloaded requiring IV diuresis; elevated DDimer 407; US lower extremity edema: Markedly limited visualization. Unable to perform compression. No evidence of deep venous thrombosis in either lower extremity. +trop .09-> .08-> .06. TTE during that time: LVEF 55-60%; moderate RVE and moderately reduced RVSF; PASP 70.9mmHg. Due to patient's weight and renal function, ischemic testing cath/ NST was precluded. Patient was ultimately discharged home on oral diuresis and eliquis 5mg PO BID.   Patient had admission to [facility] 9/18-9/29/23 for complaints of rectal pain; constipation and no reported BM x 2 weeks. CT scan consistent for  constipation Rectal pain may also be d/t anal fissure vs thrombosed hemorrhoids, started on bowel regimen and nitroglycerin  ointment rectally BID for possible fissure vs thrombosed hemorrhoids. Colorectal surgery consulted & rec outpatient follow up. During her stay, she had an acute UTI treated with Ceftriaxone x 3 days. Patient was recommended to being discharged to [facility] but refused was ultimately discharged home on 9/29/23.   Patient had readmission back to [facility] on 9/29/23 for b/l LE weakness, legs giving out while walking and almost falling. She was admitted for [facility] placement and ultimately discharged on 10/4/23.   he presented to [facility] 11/24/23 for severe rectal pain with defecation evaluated by colorectal surgery in which exam was consistent with anal fissure. CT A/P w/o acute pathology; no acute surgical intervention; SITZ bath TID, Miralax/Senna, Lidocaine Cr post BM, Nitroglycerin ointment BID. Pt also with new wound anterior right ankle area with no signs of infection and no signs of cellulitis.  Seen by podiatry, Rx betadine with gauze and yasir right ankle. Patient was ultimately discharged home on 12/1.   On  12/16/23 patient presents from [facility]. She states she had multiple episodes of non radiating mid chest pressure and shortness of breath at rest.    Patient reports that her dyspnea just worsened and she told the nursing home to send her in for an evaluation. While in the ED, patient was placed on BIPAP and was given IV lasix with some improvement. RVP then results as RSV +. Patient had a CT scan which also showed pneumonia and pulmonary HTN. hospital course C/B New Afib,  on lopressor 75mg po q8 hourly and lopressor 5mg ivp PRN, echo w/ preserved EF, - c/w heparin drip for [id number] 6   pt remains afebrile and resp status improving, weaned off from BIPAP to now on NC , BIPAP HS    CT chest reads worsening middle lobe PNA, WBC likely reactive to steroids, S/P Abx, - repeat blood clx NGTD  Patient s/p diagnostic  Right and  left heart cath yesterday, revealed mild irregularities of LAD, RCA and Circumflex  Right heart pressures associated with severe pulmonary HTN  Patient with significant improvement in SOb today, exndorses feeling much better, good apetite, and denies HA, Dizziness, sob,CP, Palpitations.

## 2023-12-30 NOTE — PROGRESS NOTE ADULT - NS ATTEND AMEND GEN_ALL_CORE FT
67 y/o female with hx of HFpEF (EF 55-60% 8/2023), on home O2 4L, pulmonary HTN, morbid obesity, asthma, CKD3, IDDM2, uterine cancer s/p MEGAN, DVT LLE (2004), chronic leg edema 2/2 venous stasis, spinal stenosis with chronic back pain who presents with midsternal chest pressure and shortness of breath. Patient has had multiple hospitalizations since September 2023. She had hospitalization 8/30-9/13/23 for acute on chronic decompensated HFpEF with possible presumed PE and severe pulmonary hypertension (PASP of 70 mmHg). During that admission, she was profoundly volume overloaded requiring IV diuresis; elevated DDimer 407; US lower extremity edema: Markedly limited visualization. Unable to perform compression. No evidence of deep venous thrombosis in either lower extremity. +trop .09-> .08-> .06. TTE during that time: LVEF 55-60%; moderate RVE and moderately reduced RVSF; PASP 70.9mmHg. Due to patient's weight and renal function, ischemic testing cath/ NST was precluded. Patient was ultimately discharged home on oral diuresis and eliquis 5mg PO BID.   Patient had admission to [facility] 9/18-9/29/23 for complaints of rectal pain; constipation and no reported BM x 2 weeks. CT scan consistent for  constipation Rectal pain may also be d/t anal fissure vs thrombosed hemorrhoids, started on bowel regimen and nitroglycerin  ointment rectally BID for possible fissure vs thrombosed hemorrhoids. Colorectal surgery consulted & rec outpatient follow up. During her stay, she had an acute UTI treated with Ceftriaxone x 3 days. Patient was recommended to being discharged to [facility] but refused was ultimately discharged home on 9/29/23.   Patient had readmission back to [facility] on 9/29/23 for b/l LE weakness, legs giving out while walking and almost falling. She was admitted for [facility] placement and ultimately discharged on 10/4/23.   he presented to [facility] 11/24/23 for severe rectal pain with defecation evaluated by colorectal surgery in which exam was consistent with anal fissure. CT A/P w/o acute pathology; no acute surgical intervention; SITZ bath TID, Miralax/Senna, Lidocaine Cr post BM, Nitroglycerin ointment BID. Pt also with new wound anterior right ankle area with no signs of infection and no signs of cellulitis.  Seen by podiatry, Rx betadine with gauze and yasir right ankle. Patient was ultimately discharged home on 12/1.   On  12/16/23 patient presents from [facility]. She states she had multiple episodes of non radiating mid chest pressure and shortness of breath at rest.    Patient reports that her dyspnea just worsened and she told the nursing home to send her in for an evaluation. While in the ED, patient was placed on BIPAP and was given IV lasix with some improvement. RVP then results as RSV +. Patient had a CT scan which also showed pneumonia and pulmonary HTN. hospital course C/B New Afib,  on lopressor 75mg po q8 hourly and lopressor 5mg ivp PRN, echo w/ preserved EF, - c/w heparin drip for [id number] 6   pt remains afebrile and resp status improving, weaned off from BIPAP to now on NC , BIPAP HS    CT chest reads worsening middle lobe PNA, WBC likely reactive to steroids, S/P Abx, - repeat blood clx NGTD  Patient s/p diagnostic  Right and  left heart cath yesterday, revealed mild irregularities of LAD, RCA and Circumflex  Right heart pressures associated with severe pulmonary HTN  Patient with significant improvement in SOb today, exndorses feeling much better, good apetite, and denies HA, Dizziness, sob,CP, Palpitations.                        Please see event history based on above    Rate control: Lopressor 150mg BID (uptitration from yesterday), patients average HR <110 which is appropriate.  xarelto for afib, no need for ASA at present, minimal CAD  needs aggressive weight loss reduction  c/w PO torsemide as ordered. patient's near euvolemia  lifestyle modifications, nutritional heart failure modifications  Home ENRIQUE testing, recommend patient be evaluation for CPAP/BIPAP at night per pulmonary  SGLT2 on discharge.    We will follow peripherally, please call with questions. 69 y/o female with hx of HFpEF (EF 55-60% 8/2023), on home O2 4L, pulmonary HTN, morbid obesity, asthma, CKD3, IDDM2, uterine cancer s/p MEGAN, DVT LLE (2004), chronic leg edema 2/2 venous stasis, spinal stenosis with chronic back pain who presents with midsternal chest pressure and shortness of breath. Patient has had multiple hospitalizations since September 2023. She had hospitalization 8/30-9/13/23 for acute on chronic decompensated HFpEF with possible presumed PE and severe pulmonary hypertension (PASP of 70 mmHg). During that admission, she was profoundly volume overloaded requiring IV diuresis; elevated DDimer 407; US lower extremity edema: Markedly limited visualization. Unable to perform compression. No evidence of deep venous thrombosis in either lower extremity. +trop .09-> .08-> .06. TTE during that time: LVEF 55-60%; moderate RVE and moderately reduced RVSF; PASP 70.9mmHg. Due to patient's weight and renal function, ischemic testing cath/ NST was precluded. Patient was ultimately discharged home on oral diuresis and eliquis 5mg PO BID.   Patient had admission to [facility] 9/18-9/29/23 for complaints of rectal pain; constipation and no reported BM x 2 weeks. CT scan consistent for  constipation Rectal pain may also be d/t anal fissure vs thrombosed hemorrhoids, started on bowel regimen and nitroglycerin  ointment rectally BID for possible fissure vs thrombosed hemorrhoids. Colorectal surgery consulted & rec outpatient follow up. During her stay, she had an acute UTI treated with Ceftriaxone x 3 days. Patient was recommended to being discharged to [facility] but refused was ultimately discharged home on 9/29/23.   Patient had readmission back to [facility] on 9/29/23 for b/l LE weakness, legs giving out while walking and almost falling. She was admitted for [facility] placement and ultimately discharged on 10/4/23.   he presented to [facility] 11/24/23 for severe rectal pain with defecation evaluated by colorectal surgery in which exam was consistent with anal fissure. CT A/P w/o acute pathology; no acute surgical intervention; SITZ bath TID, Miralax/Senna, Lidocaine Cr post BM, Nitroglycerin ointment BID. Pt also with new wound anterior right ankle area with no signs of infection and no signs of cellulitis.  Seen by podiatry, Rx betadine with gauze and yasir right ankle. Patient was ultimately discharged home on 12/1.   On  12/16/23 patient presents from [facility]. She states she had multiple episodes of non radiating mid chest pressure and shortness of breath at rest.    Patient reports that her dyspnea just worsened and she told the nursing home to send her in for an evaluation. While in the ED, patient was placed on BIPAP and was given IV lasix with some improvement. RVP then results as RSV +. Patient had a CT scan which also showed pneumonia and pulmonary HTN. hospital course C/B New Afib,  on lopressor 75mg po q8 hourly and lopressor 5mg ivp PRN, echo w/ preserved EF, - c/w heparin drip for [id number] 6   pt remains afebrile and resp status improving, weaned off from BIPAP to now on NC , BIPAP HS    CT chest reads worsening middle lobe PNA, WBC likely reactive to steroids, S/P Abx, - repeat blood clx NGTD  Patient s/p diagnostic  Right and  left heart cath yesterday, revealed mild irregularities of LAD, RCA and Circumflex  Right heart pressures associated with severe pulmonary HTN  Patient with significant improvement in SOb today, exndorses feeling much better, good apetite, and denies HA, Dizziness, sob,CP, Palpitations.                        Please see event history based on above    Rate control: Lopressor 150mg BID (uptitration from yesterday), patients average HR <110 which is appropriate.  xarelto for afib, no need for ASA at present, minimal CAD  needs aggressive weight loss reduction  c/w PO torsemide as ordered. patient's near euvolemia  lifestyle modifications, nutritional heart failure modifications  Home ENRIQUE testing, recommend patient be evaluation for CPAP/BIPAP at night per pulmonary  SGLT2 on discharge.    We will follow peripherally, please call with questions.

## 2023-12-31 LAB
ANION GAP SERPL CALC-SCNC: 14 MMOL/L — SIGNIFICANT CHANGE UP (ref 5–17)
ANION GAP SERPL CALC-SCNC: 14 MMOL/L — SIGNIFICANT CHANGE UP (ref 5–17)
BUN SERPL-MCNC: 79.2 MG/DL — HIGH (ref 8–20)
BUN SERPL-MCNC: 79.2 MG/DL — HIGH (ref 8–20)
CALCIUM SERPL-MCNC: 9.4 MG/DL — SIGNIFICANT CHANGE UP (ref 8.4–10.5)
CALCIUM SERPL-MCNC: 9.4 MG/DL — SIGNIFICANT CHANGE UP (ref 8.4–10.5)
CHLORIDE SERPL-SCNC: 94 MMOL/L — LOW (ref 96–108)
CHLORIDE SERPL-SCNC: 94 MMOL/L — LOW (ref 96–108)
CO2 SERPL-SCNC: 26 MMOL/L — SIGNIFICANT CHANGE UP (ref 22–29)
CO2 SERPL-SCNC: 26 MMOL/L — SIGNIFICANT CHANGE UP (ref 22–29)
CREAT SERPL-MCNC: 1.72 MG/DL — HIGH (ref 0.5–1.3)
CREAT SERPL-MCNC: 1.72 MG/DL — HIGH (ref 0.5–1.3)
CULTURE RESULTS: SIGNIFICANT CHANGE UP
EGFR: 32 ML/MIN/1.73M2 — LOW
EGFR: 32 ML/MIN/1.73M2 — LOW
GLUCOSE BLDC GLUCOMTR-MCNC: 106 MG/DL — HIGH (ref 70–99)
GLUCOSE BLDC GLUCOMTR-MCNC: 106 MG/DL — HIGH (ref 70–99)
GLUCOSE BLDC GLUCOMTR-MCNC: 116 MG/DL — HIGH (ref 70–99)
GLUCOSE BLDC GLUCOMTR-MCNC: 116 MG/DL — HIGH (ref 70–99)
GLUCOSE BLDC GLUCOMTR-MCNC: 121 MG/DL — HIGH (ref 70–99)
GLUCOSE BLDC GLUCOMTR-MCNC: 121 MG/DL — HIGH (ref 70–99)
GLUCOSE BLDC GLUCOMTR-MCNC: 169 MG/DL — HIGH (ref 70–99)
GLUCOSE BLDC GLUCOMTR-MCNC: 169 MG/DL — HIGH (ref 70–99)
GLUCOSE BLDC GLUCOMTR-MCNC: 82 MG/DL — SIGNIFICANT CHANGE UP (ref 70–99)
GLUCOSE BLDC GLUCOMTR-MCNC: 82 MG/DL — SIGNIFICANT CHANGE UP (ref 70–99)
GLUCOSE SERPL-MCNC: 92 MG/DL — SIGNIFICANT CHANGE UP (ref 70–99)
GLUCOSE SERPL-MCNC: 92 MG/DL — SIGNIFICANT CHANGE UP (ref 70–99)
HCT VFR BLD CALC: 41.9 % — SIGNIFICANT CHANGE UP (ref 34.5–45)
HCT VFR BLD CALC: 41.9 % — SIGNIFICANT CHANGE UP (ref 34.5–45)
HGB BLD-MCNC: 13.8 G/DL — SIGNIFICANT CHANGE UP (ref 11.5–15.5)
HGB BLD-MCNC: 13.8 G/DL — SIGNIFICANT CHANGE UP (ref 11.5–15.5)
MCHC RBC-ENTMCNC: 30.2 PG — SIGNIFICANT CHANGE UP (ref 27–34)
MCHC RBC-ENTMCNC: 30.2 PG — SIGNIFICANT CHANGE UP (ref 27–34)
MCHC RBC-ENTMCNC: 32.9 GM/DL — SIGNIFICANT CHANGE UP (ref 32–36)
MCHC RBC-ENTMCNC: 32.9 GM/DL — SIGNIFICANT CHANGE UP (ref 32–36)
MCV RBC AUTO: 91.7 FL — SIGNIFICANT CHANGE UP (ref 80–100)
MCV RBC AUTO: 91.7 FL — SIGNIFICANT CHANGE UP (ref 80–100)
PLATELET # BLD AUTO: 142 K/UL — LOW (ref 150–400)
PLATELET # BLD AUTO: 142 K/UL — LOW (ref 150–400)
POTASSIUM SERPL-MCNC: 4.8 MMOL/L — SIGNIFICANT CHANGE UP (ref 3.5–5.3)
POTASSIUM SERPL-MCNC: 4.8 MMOL/L — SIGNIFICANT CHANGE UP (ref 3.5–5.3)
POTASSIUM SERPL-SCNC: 4.8 MMOL/L — SIGNIFICANT CHANGE UP (ref 3.5–5.3)
POTASSIUM SERPL-SCNC: 4.8 MMOL/L — SIGNIFICANT CHANGE UP (ref 3.5–5.3)
RBC # BLD: 4.57 M/UL — SIGNIFICANT CHANGE UP (ref 3.8–5.2)
RBC # BLD: 4.57 M/UL — SIGNIFICANT CHANGE UP (ref 3.8–5.2)
RBC # FLD: 14.6 % — HIGH (ref 10.3–14.5)
RBC # FLD: 14.6 % — HIGH (ref 10.3–14.5)
SODIUM SERPL-SCNC: 134 MMOL/L — LOW (ref 135–145)
SODIUM SERPL-SCNC: 134 MMOL/L — LOW (ref 135–145)
SPECIMEN SOURCE: SIGNIFICANT CHANGE UP
WBC # BLD: 18.8 K/UL — HIGH (ref 3.8–10.5)
WBC # BLD: 18.8 K/UL — HIGH (ref 3.8–10.5)
WBC # FLD AUTO: 18.8 K/UL — HIGH (ref 3.8–10.5)
WBC # FLD AUTO: 18.8 K/UL — HIGH (ref 3.8–10.5)

## 2023-12-31 PROCEDURE — 99232 SBSQ HOSP IP/OBS MODERATE 35: CPT

## 2023-12-31 PROCEDURE — 99231 SBSQ HOSP IP/OBS SF/LOW 25: CPT

## 2023-12-31 RX ORDER — OXYCODONE HYDROCHLORIDE 5 MG/1
60 TABLET ORAL EVERY 12 HOURS
Refills: 0 | Status: DISCONTINUED | OUTPATIENT
Start: 2023-12-31 | End: 2024-01-01

## 2023-12-31 RX ORDER — OXYCODONE HYDROCHLORIDE 5 MG/1
60 TABLET ORAL
Refills: 0 | Status: DISCONTINUED | OUTPATIENT
Start: 2023-12-31 | End: 2023-12-31

## 2023-12-31 RX ADMIN — Medication 4 MILLILITER(S): at 08:23

## 2023-12-31 RX ADMIN — Medication 150 MILLIGRAM(S): at 05:13

## 2023-12-31 RX ADMIN — OXYCODONE HYDROCHLORIDE 5 MILLIGRAM(S): 5 TABLET ORAL at 11:12

## 2023-12-31 RX ADMIN — POLYETHYLENE GLYCOL 3350 17 GRAM(S): 17 POWDER, FOR SOLUTION ORAL at 12:59

## 2023-12-31 RX ADMIN — INSULIN GLARGINE 40 UNIT(S): 100 INJECTION, SOLUTION SUBCUTANEOUS at 08:46

## 2023-12-31 RX ADMIN — Medication 1 APPLICATION(S): at 05:14

## 2023-12-31 RX ADMIN — OXYCODONE HYDROCHLORIDE 5 MILLIGRAM(S): 5 TABLET ORAL at 18:59

## 2023-12-31 RX ADMIN — LACTULOSE 20 GRAM(S): 10 SOLUTION ORAL at 12:59

## 2023-12-31 RX ADMIN — ATORVASTATIN CALCIUM 80 MILLIGRAM(S): 80 TABLET, FILM COATED ORAL at 21:36

## 2023-12-31 RX ADMIN — Medication 500 MICROGRAM(S): at 21:58

## 2023-12-31 RX ADMIN — BUDESONIDE AND FORMOTEROL FUMARATE DIHYDRATE 2 PUFF(S): 160; 4.5 AEROSOL RESPIRATORY (INHALATION) at 08:27

## 2023-12-31 RX ADMIN — OXYCODONE HYDROCHLORIDE 5 MILLIGRAM(S): 5 TABLET ORAL at 10:21

## 2023-12-31 RX ADMIN — LEVALBUTEROL 0.63 MILLIGRAM(S): 1.25 SOLUTION, CONCENTRATE RESPIRATORY (INHALATION) at 15:21

## 2023-12-31 RX ADMIN — NYSTATIN CREAM 1 APPLICATION(S): 100000 CREAM TOPICAL at 17:15

## 2023-12-31 RX ADMIN — Medication 38 UNIT(S): at 13:00

## 2023-12-31 RX ADMIN — LEVALBUTEROL 0.63 MILLIGRAM(S): 1.25 SOLUTION, CONCENTRATE RESPIRATORY (INHALATION) at 08:23

## 2023-12-31 RX ADMIN — CHLORHEXIDINE GLUCONATE 1 APPLICATION(S): 213 SOLUTION TOPICAL at 05:14

## 2023-12-31 RX ADMIN — BUDESONIDE AND FORMOTEROL FUMARATE DIHYDRATE 2 PUFF(S): 160; 4.5 AEROSOL RESPIRATORY (INHALATION) at 21:58

## 2023-12-31 RX ADMIN — Medication 150 MILLIGRAM(S): at 17:14

## 2023-12-31 RX ADMIN — Medication 500 MICROGRAM(S): at 15:22

## 2023-12-31 RX ADMIN — ONDANSETRON 4 MILLIGRAM(S): 8 TABLET, FILM COATED ORAL at 15:38

## 2023-12-31 RX ADMIN — NYSTATIN CREAM 1 APPLICATION(S): 100000 CREAM TOPICAL at 05:13

## 2023-12-31 RX ADMIN — INSULIN GLARGINE 40 UNIT(S): 100 INJECTION, SOLUTION SUBCUTANEOUS at 21:39

## 2023-12-31 RX ADMIN — LEVALBUTEROL 0.63 MILLIGRAM(S): 1.25 SOLUTION, CONCENTRATE RESPIRATORY (INHALATION) at 21:57

## 2023-12-31 RX ADMIN — Medication 4: at 13:00

## 2023-12-31 RX ADMIN — Medication 38 UNIT(S): at 08:46

## 2023-12-31 RX ADMIN — FAMOTIDINE 20 MILLIGRAM(S): 10 INJECTION INTRAVENOUS at 12:59

## 2023-12-31 RX ADMIN — Medication 1 APPLICATION(S): at 17:15

## 2023-12-31 RX ADMIN — OXYCODONE HYDROCHLORIDE 60 MILLIGRAM(S): 5 TABLET ORAL at 20:28

## 2023-12-31 RX ADMIN — Medication 20 MILLIGRAM(S): at 05:13

## 2023-12-31 RX ADMIN — SENNA PLUS 2 TABLET(S): 8.6 TABLET ORAL at 21:36

## 2023-12-31 RX ADMIN — RIVAROXABAN 15 MILLIGRAM(S): KIT at 17:53

## 2023-12-31 RX ADMIN — OXYCODONE HYDROCHLORIDE 60 MILLIGRAM(S): 5 TABLET ORAL at 21:38

## 2023-12-31 RX ADMIN — OXYCODONE HYDROCHLORIDE 5 MILLIGRAM(S): 5 TABLET ORAL at 17:14

## 2023-12-31 NOTE — PROGRESS NOTE ADULT - ASSESSMENT
69y/o female    1- acute on chroinic resp  failure hypoxic  2- +  RSV  on 12/16/2023 COPD - improved   3- RUL pneumonia ? 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   5- new afib  HF pEF  6-   cath 12/29/23  mild LAD   severe pulm htn   PASP 70 mmg Hg   LVEDP stable  ECHO  grade 2 diastolic dysfunctions   7- CKD 3  8- sleep disordered breathing -  tolerating pm cpap       -    po steroid   taper   - incentive spirometry  to be   used     -   -s/p antibiotics  - incentive spirometry     - 4l/min     oxygen nc   titrate down oxygen as tolerated - improved with  diuresis   -xarelto to start     - lopressor to  150 mg po q 12  -    s/p  dig   -   will try to contact pulm HTN  service can be seen as outpt   -  pepcid    add am ppi   -sputum culture  - change  to xopenex   - PPI    Thank you kindly for allowing us to participate in this patients care.    Please  feel free to reach out with any questions or suggestions    MAKENNA EUBANKS    PULMONARY and CRITICAL CARE   [facility] Physician [facility]     [phone]       67y/o female    1- acute on chroinic resp  failure hypoxic  2- +  RSV  on 12/16/2023 COPD - improved   3- RUL pneumonia ? 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   5- new afib  HF pEF  6-   cath 12/29/23  mild LAD   severe pulm htn   PASP 70 mmg Hg   LVEDP stable  ECHO  grade 2 diastolic dysfunctions   7- CKD 3  8- sleep disordered breathing -  tolerating pm cpap       -    po steroid   taper   - incentive spirometry  to be   used     -   -s/p antibiotics  - incentive spirometry     - 4l/min     oxygen nc   titrate down oxygen as tolerated - improved with  diuresis   -xarelto to start     - lopressor to  150 mg po q 12  -    s/p  dig   -   will try to contact pulm HTN  service can be seen as outpt   -  pepcid    add am ppi   -sputum culture  - change  to xopenex   - PPI    Thank you kindly for allowing us to participate in this patients care.    Please  feel free to reach out with any questions or suggestions    MAKENNA EUBANKS    PULMONARY and CRITICAL CARE   [facility] Physician [facility]     [phone]

## 2023-12-31 NOTE — PROGRESS NOTE ADULT - SUBJECTIVE AND OBJECTIVE BOX
NEPHROLOGY INTERVAL HPI/OVERNIGHT EVENTS:    Examined earlier  No distress    MEDICATIONS  (STANDING):  acetylcysteine 20%  Inhalation 4 milliLiter(s) Inhalation daily  atorvastatin 80 milliGRAM(s) Oral at bedtime  budesonide  80 MICROgram(s)/formoterol 4.5 MICROgram(s) Inhaler 2 Puff(s) Inhalation two times a day  chlorhexidine 2% Cloths 1 Application(s) Topical <User Schedule>  clotrimazole 1% Cream 1 Application(s) Topical two times a day  dextrose 5%. 1000 milliLiter(s) (100 mL/Hr) IV Continuous <Continuous>  dextrose 5%. 1000 milliLiter(s) (50 mL/Hr) IV Continuous <Continuous>  dextrose 50% Injectable 25 Gram(s) IV Push once  dextrose 50% Injectable 12.5 Gram(s) IV Push once  dextrose 50% Injectable 25 Gram(s) IV Push once  famotidine    Tablet 20 milliGRAM(s) Oral daily  glucagon  Injectable 1 milliGRAM(s) IntraMuscular once  insulin glargine Injectable (LANTUS) 40 Unit(s) SubCutaneous at bedtime  insulin glargine Injectable (LANTUS) 40 Unit(s) SubCutaneous every morning  insulin lispro (ADMELOG) corrective regimen sliding scale   SubCutaneous three times a day before meals  insulin lispro Injectable (ADMELOG) 38 Unit(s) SubCutaneous three times a day before meals  ipratropium    for Nebulization 500 MICROGram(s) Nebulizer every 6 hours  lactulose Syrup 20 Gram(s) Oral daily  levalbuterol Inhalation 0.63 milliGRAM(s) Inhalation every 6 hours  metoprolol tartrate 150 milliGRAM(s) Oral two times a day  nystatin Powder 1 Application(s) Topical two times a day  polyethylene glycol 3350 17 Gram(s) Oral daily  rivaroxaban 15 milliGRAM(s) Oral daily  senna 2 Tablet(s) Oral at bedtime  torsemide 20 milliGRAM(s) Oral daily    MEDICATIONS  (PRN):  acetaminophen     Tablet .. 650 milliGRAM(s) Oral every 6 hours PRN Temp greater or equal to 38C (100.4F), Mild Pain (1 - 3)  ALPRAZolam 0.25 milliGRAM(s) Oral every 8 hours PRN for anxiety/Sleep  dextrose Oral Gel 15 Gram(s) Oral once PRN Blood Glucose LESS THAN 70 milliGRAM(s)/deciliter  diphenhydrAMINE 25 milliGRAM(s) Oral every 6 hours PRN Rash and/or Itching  hydrALAZINE Injectable 10 milliGRAM(s) IV Push every 4 hours PRN for systolic bp > 160  melatonin 3 milliGRAM(s) Oral at bedtime PRN Insomnia  metoprolol tartrate Injectable 5 milliGRAM(s) IV Push every 6 hours PRN heart rate sustaining greater than 130  ondansetron Injectable 4 milliGRAM(s) IV Push every 8 hours PRN Nausea and/or Vomiting  oxyCODONE    IR 5 milliGRAM(s) Oral every 6 hours PRN Moderate Pain (4 - 6)      Allergies    wool- rash, itch (Other)  adhesives (Rash)  latex (Rash)  Bactrim (Flushing)    Intolerances        Vital Signs Last 24 Hrs  T(C): 36.7 (31 Dec 2023 12:00), Max: 36.8 (31 Dec 2023 04:55)  T(F): 98.1 (31 Dec 2023 12:00), Max: 98.3 (31 Dec 2023 04:55)  HR: 99 (31 Dec 2023 15:34) (74 - 105)  BP: 134/74 (31 Dec 2023 15:34) (110/70 - 134/74)  BP(mean): --  RR: 18 (31 Dec 2023 15:34) (17 - 19)  SpO2: 96% (31 Dec 2023 15:34) (96% - 99%)    Parameters below as of 31 Dec 2023 15:34  Patient On (Oxygen Delivery Method): nasal cannula  O2 Flow (L/min): 4    Daily     Daily     PHYSICAL EXAM:  Gen: obese woman  	HEENT: +NC  	Pulm: decreased bs  	CV: RRR, S1S2; no rub  	Back: No spinal or CVA tenderness; no sacral edema  	Abd: +BS, soft, nontender/nondistended  	: No suprapubic tenderness  	LE: Warm, leg pitting edema     LABS:                        13.8   18.80 )-----------( 142      ( 31 Dec 2023 05:51 )             41.9     12-30    138  |  96  |  80.0<H>  ----------------------------<  259<H>  4.6   |  28.0  |  1.64<H>    Ca    9.3      30 Dec 2023 06:11      PTT - ( 30 Dec 2023 06:11 )  PTT:26.5 sec  Urinalysis Basic - ( 30 Dec 2023 06:11 )    Color: x / Appearance: x / SG: x / pH: x  Gluc: 259 mg/dL / Ketone: x  / Bili: x / Urobili: x   Blood: x / Protein: x / Nitrite: x   Leuk Esterase: x / RBC: x / WBC x   Sq Epi: x / Non Sq Epi: x / Bacteria: x              RADIOLOGY & ADDITIONAL TESTS:

## 2023-12-31 NOTE — PROGRESS NOTE ADULT - ASSESSMENT
69 yo female with HTN, HLD, DM2, HFpEF, CKD III, DVT, Uterine CA, COPD on home o2 who presented with complaints of shortness of breath. Patient reports that she has been sick all week and was recently started on Vantin and steroids while at the nursing home. Pt admitted for acute on chronic respiratory failure sec to Acute HFpEF exacerbation with likely with Superimposed bacterial Pneumonia in setting of RSV, Severe pulm HTN, new Afib..CT scan which also showed pneumonia and pulmonary HTN. S/P [facility] 12/29 with mild LAD disease,pulm htn. Pt was wean off from BIPAP,now on NC. and was given IV lasix,Started on heparin drip and changed to Xarelto after cath.    New afib RVR  -  loprssor 150mg q12hr  - Xarelto 15 mg qd per renal function  - EP consulted, no DCCV given resp issues  - F/U EP rec  - TTE W or WO Ultrasound Enhancing Agent (12.18.23)EF 60 to 65%. There is moderate (grade 2) left ventricular diastolic dysfunction, with normal filling pressure.      # Acute on chronic respiratory failure  # Acute HFpEF exacerbation   # Superimposed bacterial Pneumonia in setting of RSV  # Severe pulm HTN  - so2 4 L.home 4 L NC  - CT chest reads worsening middle lobe PNA  - sputum clx ordered for last several days, pt unable to bring up sputum  - repeat MRSA PCR negative  - ID consulted, WBC likely reactive to steroids  - antibiotics stopped on 12/28, WBC trended down  - pt high risk for recurrent aspiration  - repeat blood clx NGTD  - tapering steroids   - levalbuterol and ipratropium q6hrs, symbicort BID   - IV Lasix on hold for JACQUES  - BIPAP qhs  - f/u pulm rec  --S/p [facility] mild LAD disease/ Severe pulmonary HTN 70 / LVEDP stable  -F/U Cardio rec    # previous suspicion for PE/ DVT on admission  - Patient has empirically been treated for PE/DVT since August  - No scans noted to have PE/DVT  - duplex lower ext to without dvt   - d dimer negative    # JACQUES  - likely combination of pre-renal and ATN  - lasix on hold  - will pre-hydrate before and after cath  - nephrology consulted  - trend BMP    #Chronic pain  - continue home Pain meds of Oxycodone 60 mg q12h and Oxycodone 5mg q6h PRN    # HLD  - Atorvastatin for rosuvastatin    # COPD  -oxygen supplement  - taper off  steroids   - Symbicort, Spiriva, albuterol  - pulm following     #DM  #steroid induced hyperglycemia  -High BG  -A1C 8.3  - lantus 40 u bid  -increase 38 u meal coverage  -lispro sliding scale  -f/u endocrine rec    DVT prophylaxis: Xarelto   Dispo:  69 yo female with HTN, HLD, DM2, HFpEF, CKD III, DVT, Uterine CA, COPD on home o2 who presented with complaints of shortness of breath. Patient reports that she has been sick all week and was recently started on Vantin and steroids while at the nursing home. Pt admitted for acute on chronic respiratory failure sec to Acute HFpEF exacerbation with likely with Superimposed bacterial Pneumonia in setting of RSV, Severe pulm HTN, new Afib..CT scan which also showed pneumonia and pulmonary HTN. S/P [facility] 12/29 with mild LAD disease,pulm htn. Pt was wean off from BIPAP,now on NC. and was given IV lasix,Started on heparin drip and changed to Xarelto after cath.    New afib RVR  -  loprssor 150mg q12hr  - Xarelto 15 mg qd per renal function  - EP consulted, no DCCV given resp issues  - F/U EP rec  - TTE W or WO Ultrasound Enhancing Agent (12.18.23)EF 60 to 65%. There is moderate (grade 2) left ventricular diastolic dysfunction, with normal filling pressure.      # Acute on chronic respiratory failure  # Acute HFpEF exacerbation   # Superimposed bacterial Pneumonia in setting of RSV  # Severe pulm HTN  - so2 4 L.home 4 L NC  - CT chest reads worsening middle lobe PNA  - sputum clx ordered for last several days, pt unable to bring up sputum  - repeat MRSA PCR negative  - ID consulted, WBC likely reactive to steroids  - antibiotics stopped on 12/28, WBC trended down  - pt high risk for recurrent aspiration  - repeat blood clx NGTD  - tapering steroids   - levalbuterol and ipratropium q6hrs, symbicort BID   - IV Lasix on hold for JACQUES  - BIPAP qhs  - f/u pulm rec  --S/p [facility] mild LAD disease/ Severe pulmonary HTN 70 / LVEDP stable  -F/U Cardio rec    # previous suspicion for PE/ DVT on admission  - Patient has empirically been treated for PE/DVT since August  - No scans noted to have PE/DVT  - duplex lower ext to without dvt   - d dimer negative    # JACQUES  - likely combination of pre-renal and ATN  - lasix on hold  - will pre-hydrate before and after cath  - nephrology consulted  - trend BMP    #Chronic pain  - continue home Pain meds of Oxycodone 60 mg q12h and Oxycodone 5mg q6h PRN    # HLD  - Atorvastatin for rosuvastatin    # COPD  -oxygen supplement  - taper off  steroids   - Symbicort, Spiriva, albuterol  - pulm following     #DM  #steroid induced hyperglycemia  -High BG  -A1C 8.3  - lantus 40 u bid  -increase 38 u meal coverage  -lispro sliding scale  -f/u endocrine rec    DVT prophylaxis: Xarelto   Dispo:  67 yo female with HTN, HLD, DM2, HFpEF, CKD III, DVT, Uterine CA, COPD on home o2 who presented with complaints of shortness of breath. Patient reports that she has been sick all week and was recently started on Vantin and steroids while at the nursing home. Pt admitted for acute on chronic respiratory failure sec to Acute HFpEF exacerbation with likely with Superimposed bacterial Pneumonia in setting of RSV, Severe pulm HTN, new Afib..CT scan which also showed pneumonia and pulmonary HTN. S/P [facility] 12/29 with mild LAD disease,pulm htn. Pt was wean off from BIPAP,now on NC. and was given IV lasix,Started on heparin drip and changed to Xarelto after cath.    New afib RVR  - hr stable  -  loprssor 150mg q12hr  - Xarelto 15 mg qd per renal function  - EP consulted, no DCCV given resp issues  - F/U EP rec  - TTE W or WO Ultrasound Enhancing Agent (12.18.23)EF 60 to 65%. There is moderate (grade 2) left ventricular diastolic dysfunction, with normal filling pressure.      # Acute on chronic respiratory failure  # Acute HFpEF exacerbation   # Superimposed bacterial Pneumonia in setting of RSV  # Severe pulm HTN  - so2 4 L.home 4 L NC  - CT chest reads worsening middle lobe PNA  - S/p [facility] mild LAD disease/ Severe pulmonary HTN 70 / LVEDP stable  - sputum clx ordered for last several days, pt unable to bring up sputum  - repeat MRSA PCR negative  - ID consulted, WBC likely reactive to steroids  - antibiotics stopped on 12/28, WBC trended down  - pt high risk for recurrent aspiration  - repeat blood clx NGTD  - S/P  steroids   - levalbuterol and ipratropium q6hrs, symbicort BID   - Torsemide  - BIPAP qhs  - f/u pulm rec  -F/U Cardio rec    # previous suspicion for PE/ DVT on admission  - Patient has empirically been treated for PE/DVT since August  - No scans noted to have PE/DVT  - duplex lower ext to without dvt   - d dimer negative    # JACQUES- improving  - likely combination of pre-renal and ATN  - lasix on hold  - will pre-hydrate before and after cath  - nephrology consulted  - trend BMP    #Chronic pain  - continue home Pain meds of Oxycodone 60 mg q12h and Oxycodone 5mg q6h PRN    # HLD  - Atorvastatin for rosuvastatin    # COPD  -oxygen supplement  - taper off  steroids   - Symbicort, Spiriva, albuterol  - pulm following     #DM  #steroid induced hyperglycemia  -High BG  -A1C 8.3  - lantus 40 u bid  -increase 38 u meal coverage  -lispro sliding scale  -f/u endocrine rec    DVT prophylaxis: Xarelto   Dispo: [facility] Pt 69 yo female with HTN, HLD, DM2, HFpEF, CKD III, DVT, Uterine CA, COPD on home o2 who presented with complaints of shortness of breath. Patient reports that she has been sick all week and was recently started on Vantin and steroids while at the nursing home. Pt admitted for acute on chronic respiratory failure sec to Acute HFpEF exacerbation with likely with Superimposed bacterial Pneumonia in setting of RSV, Severe pulm HTN, new Afib..CT scan which also showed pneumonia and pulmonary HTN. S/P [facility] 12/29 with mild LAD disease,pulm htn. Pt was wean off from BIPAP,now on NC. and was given IV lasix,Started on heparin drip and changed to Xarelto after cath.    New afib RVR  - hr stable  -  loprssor 150mg q12hr  - Xarelto 15 mg qd per renal function  - EP consulted, no DCCV given resp issues  - F/U EP rec  - TTE W or WO Ultrasound Enhancing Agent (12.18.23)EF 60 to 65%. There is moderate (grade 2) left ventricular diastolic dysfunction, with normal filling pressure.      # Acute on chronic respiratory failure  # Acute HFpEF exacerbation   # Superimposed bacterial Pneumonia in setting of RSV  # Severe pulm HTN  - so2 4 L.home 4 L NC  - CT chest reads worsening middle lobe PNA  - S/p [facility] mild LAD disease/ Severe pulmonary HTN 70 / LVEDP stable  - sputum clx ordered for last several days, pt unable to bring up sputum  - repeat MRSA PCR negative  - ID consulted, WBC likely reactive to steroids  - antibiotics stopped on 12/28, WBC trended down  - pt high risk for recurrent aspiration  - repeat blood clx NGTD  - S/P  steroids   - levalbuterol and ipratropium q6hrs, symbicort BID   - Torsemide  - BIPAP qhs  - f/u pulm rec  -F/U Cardio rec    # previous suspicion for PE/ DVT on admission  - Patient has empirically been treated for PE/DVT since August  - No scans noted to have PE/DVT  - duplex lower ext to without dvt   - d dimer negative    # JACQUES- improving  - likely combination of pre-renal and ATN  - lasix on hold  - will pre-hydrate before and after cath  - nephrology consulted  - trend BMP    #Chronic pain  - continue home Pain meds of Oxycodone 60 mg q12h and Oxycodone 5mg q6h PRN    # HLD  - Atorvastatin for rosuvastatin    # COPD  -oxygen supplement  - taper off  steroids   - Symbicort, Spiriva, albuterol  - pulm following     #DM  #steroid induced hyperglycemia  -High BG  -A1C 8.3  - lantus 40 u bid  -increase 38 u meal coverage  -lispro sliding scale  -f/u endocrine rec    DVT prophylaxis: Xarelto   Dispo: [facility] Pt

## 2023-12-31 NOTE — PROGRESS NOTE ADULT - NUTRITIONAL ASSESSMENT
This patient has been assessed with a concern for Malnutrition and has been determined to have a diagnosis/diagnoses of Severe protein-calorie malnutrition and Morbid obesity (BMI > 40).    This patient is being managed with:   Diet Consistent Carbohydrate w/Evening Snack-  1500mL Fluid Restriction ([id number])  Low Sodium  Entered: Dec 19 2023  6:23PM   This patient has been assessed with a concern for Malnutrition and has been determined to have a diagnosis/diagnoses of Severe protein-calorie malnutrition and Morbid obesity (BMI > 40).    This patient is being managed with:   Diet Consistent Carbohydrate w/Evening Snack-  1500mL Fluid Restriction ([id number])  Low Sodium  Entered: Dec 19 2023  6:23PM

## 2023-12-31 NOTE — PROGRESS NOTE ADULT - SUBJECTIVE AND OBJECTIVE BOX
PULMONARY PROGRESS NOTE      [id number]    Patient is a 68y old  Female who presents with a chief complaint of SOB (31 Dec 2023 08:07)      BRIEF HOSPITAL COURSE: ** resp failure  RSV *    Events last 24 hours: **  -currently feeling better  - on  3-4 l/min oxygen tolerated bipap overnight few hours  - cough improved    REVIEW OF SYSTEMS     CONSTITUTIONAL   no fevers  no loss of appetite  no weight loss   HEENT  no sore throat   no ringing in ears  NECK   no pain   RESPIRATORY  see HPI   CARDIOVASCULAR  no chest  pain no palpitations   GASTROINTESTINAL no vomiting  no diarrhea    no   gerd   MUSCULOSKELETAL  no joint pains    no  back pain   SKIN   no rash   no itchiness   GENITOURINARY  no dysuria   HEME    no bleeding or bruising   ENDOCRINE     no   warmth   no  sweating   no  cold intolerance   NEUROLOGIC  no tremors  no seizures  no    weakness    PSYCHIATRIC   no mood disorder    no delirium   *    PAST MEDICAL & SURGICAL HISTORY:  Diabetes Mellitus Type II      Endometrial Hyperplasia      Cervical Stenosis of Spine      Spinal Stenosis, Lumbar      Deep Vein Thrombosis (DVT)  Left leg, 2004, treated and resolved      Dyslipidemia      Cataract      Morbid Obesity      Vitamin D deficiency      Insomnia      CKD (chronic kidney disease)  ~ III      Congestive heart failure  ~ HFpEF      Uterine cancer      Asthma      On home oxygen therapy      Gait difficulty  ~ u ses walker      Cataract extracted with lens implant 1998  Right      C Section 1994      Cholecystectomy/appendectomy @ age 25      D&C x2 1980's      D&C 2008  hysteroscopy, endometrial hyperplasia, 2009      Cervical Spinal Stenosis surgery x2 (01/2002, 7/2002)      Tonsillectomy as a child      Endometrial biopsy 12/02/09      H/O laser iridotomy  left eye, 2016      H/O colonoscopy  1998      S/P total abdominal hysterectomy and bilateral salpingo-oophorectomy      S/P appendectomy      S/P cholecystectomy            Medications:    hydrALAZINE Injectable 10 milliGRAM(s) IV Push every 4 hours PRN  metoprolol tartrate 150 milliGRAM(s) Oral two times a day  metoprolol tartrate Injectable 5 milliGRAM(s) IV Push every 6 hours PRN  torsemide 20 milliGRAM(s) Oral daily    acetylcysteine 20%  Inhalation 4 milliLiter(s) Inhalation daily  budesonide  80 MICROgram(s)/formoterol 4.5 MICROgram(s) Inhaler 2 Puff(s) Inhalation two times a day  ipratropium    for Nebulization 500 MICROGram(s) Nebulizer every 6 hours  levalbuterol Inhalation 0.63 milliGRAM(s) Inhalation every 6 hours    acetaminophen     Tablet .. 650 milliGRAM(s) Oral every 6 hours PRN  ALPRAZolam 0.25 milliGRAM(s) Oral every 8 hours PRN  diphenhydrAMINE 25 milliGRAM(s) Oral every 6 hours PRN  melatonin 3 milliGRAM(s) Oral at bedtime PRN  ondansetron Injectable 4 milliGRAM(s) IV Push every 8 hours PRN  oxyCODONE    IR 5 milliGRAM(s) Oral every 6 hours PRN      rivaroxaban 15 milliGRAM(s) Oral daily    famotidine    Tablet 20 milliGRAM(s) Oral daily  lactulose Syrup 20 Gram(s) Oral daily  polyethylene glycol 3350 17 Gram(s) Oral daily  senna 2 Tablet(s) Oral at bedtime      atorvastatin 80 milliGRAM(s) Oral at bedtime  dextrose 50% Injectable 25 Gram(s) IV Push once  dextrose 50% Injectable 12.5 Gram(s) IV Push once  dextrose 50% Injectable 25 Gram(s) IV Push once  dextrose Oral Gel 15 Gram(s) Oral once PRN  glucagon  Injectable 1 milliGRAM(s) IntraMuscular once  insulin glargine Injectable (LANTUS) 40 Unit(s) SubCutaneous every morning  insulin glargine Injectable (LANTUS) 40 Unit(s) SubCutaneous at bedtime  insulin lispro (ADMELOG) corrective regimen sliding scale   SubCutaneous three times a day before meals  insulin lispro Injectable (ADMELOG) 38 Unit(s) SubCutaneous three times a day before meals    dextrose 5%. 1000 milliLiter(s) IV Continuous <Continuous>  dextrose 5%. 1000 milliLiter(s) IV Continuous <Continuous>      chlorhexidine 2% Cloths 1 Application(s) Topical <User Schedule>  clotrimazole 1% Cream 1 Application(s) Topical two times a day  nystatin Powder 1 Application(s) Topical two times a day            ICU Vital Signs Last 24 Hrs  T(C): 36.7 (31 Dec 2023 12:00), Max: 36.8 (31 Dec 2023 04:55)  T(F): 98.1 (31 Dec 2023 12:00), Max: 98.3 (31 Dec 2023 04:55)  HR: 98 (31 Dec 2023 12:00) (74 - 112)  BP: 129/57 (31 Dec 2023 12:00) (92/49 - 133/55)  BP(mean): --  ABP: --  ABP(mean): --  RR: 18 (31 Dec 2023 12:00) (17 - 19)  SpO2: 97% (31 Dec 2023 12:00) (95% - 99%)    O2 Parameters below as of 31 Dec 2023 12:00  Patient On (Oxygen Delivery Method): nasal cannula  O2 Flow (L/min): 4              I&O's Detail    30 Dec 2023 07:01  -  31 Dec 2023 07:00  --------------------------------------------------------  IN:    Oral Fluid: 840 mL  Total IN: 840 mL    OUT:    Indwelling Catheter - Urethral (mL): 2750 mL  Total OUT: 2750 mL    Total NET: -1910 mL      31 Dec 2023 07:01  -  31 Dec 2023 14:41  --------------------------------------------------------  IN:    Oral Fluid: 400 mL  Total IN: 400 mL    OUT:    Indwelling Catheter - Urethral (mL): 2050 mL  Total OUT: 2050 mL    Total NET: -1650 mL            LABS:                        13.8   18.80 )-----------( 142      ( 31 Dec 2023 05:51 )             41.9     12-30    138  |  96  |  80.0<H>  ----------------------------<  259<H>  4.6   |  28.0  |  1.64<H>    Ca    9.3      30 Dec 2023 06:11            CAPILLARY BLOOD GLUCOSE      POCT Blood Glucose.: 106 mg/dL (31 Dec 2023 14:36)    PTT - ( 30 Dec 2023 06:11 )  PTT:26.5 sec  Urinalysis Basic - ( 30 Dec 2023 06:11 )    Color: x / Appearance: x / SG: x / pH: x  Gluc: 259 mg/dL / Ketone: x  / Bili: x / Urobili: x   Blood: x / Protein: x / Nitrite: x   Leuk Esterase: x / RBC: x / WBC x   Sq Epi: x / Non Sq Epi: x / Bacteria: x      CULTURES:  Rapid RVP Result: NotDetec (12-30 @ 16:30)  Culture Results:   No growth at 4 days (12-26 @ 09:31)  Culture Results:   No growth at 4 days (12-26 @ 09:21)      Physical Examination:    General: No acute distress.   in bed obese f    HEENT: Pupils equal, reactive to light.  Symmetric.    PULM: Clear to auscultation bilaterally, no significant sputum production    NECK: Supple, no lymphadenopathy, trachea midline    CVS: Regular rate and rhythm, no murmurs, rubs, or gallops    ABD: Soft, nondistended, nontender, normoactive bowel sounds, no masses    EXT:  mid edema  SKIN: Warm and well perfused, no rashes noted.    NEURO: Alert, oriented, interactive, nonfocal    DEVICES:     RADIOLOGY: *  PROCEDURE DATE:  12/30/2023          INTERPRETATION:  CLINICAL STATEMENT: Shortness of breath, heart failure    TECHNIQUE: AP view of the chest.      COMPARISON: 12/26/2023    Limited by patient positioning/rotation.    Heart size may be exaggerated by AP technique and patient body habitus.   Elevation right hemidiaphragm. Question mild pulmonary vascular   congestion. Otherwise, no focal lung consolidation, sizable effusion or   pneumothorax.    No significant osseous abnormality.    IMPRESSION:    Limitation as above.    Question mild pulmonary vascular congestion. No focal lung consolidation,   sizable effusion or pneumothorax.    --- End of Report ---            RAFAEL MIRANDA MD; Attending Radiologist  This document has been electronically signed. Dec 31 2023 12:21PM  **    CRITICAL CARE TIME SPENT: ***   PULMONARY PROGRESS NOTE      [id number]    Patient is a 68y old  Female who presents with a chief complaint of SOB (31 Dec 2023 08:07)      BRIEF HOSPITAL COURSE: ** resp failure  RSV *    Events last 24 hours: **  -currently feeling better  - on  3-4 l/min oxygen tolerated bipap overnight few hours  - cough improved    REVIEW OF SYSTEMS     CONSTITUTIONAL   no fevers  no loss of appetite  no weight loss   HEENT  no sore throat   no ringing in ears  NECK   no pain   RESPIRATORY  see HPI   CARDIOVASCULAR  no chest  pain no palpitations   GASTROINTESTINAL no vomiting  no diarrhea    no   gerd   MUSCULOSKELETAL  no joint pains    no  back pain   SKIN   no rash   no itchiness   GENITOURINARY  no dysuria   HEME    no bleeding or bruising   ENDOCRINE     no   warmth   no  sweating   no  cold intolerance   NEUROLOGIC  no tremors  no seizures  no    weakness    PSYCHIATRIC   no mood disorder    no delirium   *    PAST MEDICAL & SURGICAL HISTORY:  Diabetes Mellitus Type II      Endometrial Hyperplasia      Cervical Stenosis of Spine      Spinal Stenosis, Lumbar      Deep Vein Thrombosis (DVT)  Left leg, 2004, treated and resolved      Dyslipidemia      Cataract      Morbid Obesity      Vitamin D deficiency      Insomnia      CKD (chronic kidney disease)  ~ III      Congestive heart failure  ~ HFpEF      Uterine cancer      Asthma      On home oxygen therapy      Gait difficulty  ~ u ses walker      Cataract extracted with lens implant 1998  Right      C Section 1994      Cholecystectomy/appendectomy @ age 25      D&C x2 1980's      D&C 2008  hysteroscopy, endometrial hyperplasia, 2009      Cervical Spinal Stenosis surgery x2 (01/2002, 7/2002)      Tonsillectomy as a child      Endometrial biopsy 12/02/09      H/O laser iridotomy  left eye, 2016      H/O colonoscopy  1998      S/P total abdominal hysterectomy and bilateral salpingo-oophorectomy      S/P appendectomy      S/P cholecystectomy            Medications:    hydrALAZINE Injectable 10 milliGRAM(s) IV Push every 4 hours PRN  metoprolol tartrate 150 milliGRAM(s) Oral two times a day  metoprolol tartrate Injectable 5 milliGRAM(s) IV Push every 6 hours PRN  torsemide 20 milliGRAM(s) Oral daily    acetylcysteine 20%  Inhalation 4 milliLiter(s) Inhalation daily  budesonide  80 MICROgram(s)/formoterol 4.5 MICROgram(s) Inhaler 2 Puff(s) Inhalation two times a day  ipratropium    for Nebulization 500 MICROGram(s) Nebulizer every 6 hours  levalbuterol Inhalation 0.63 milliGRAM(s) Inhalation every 6 hours    acetaminophen     Tablet .. 650 milliGRAM(s) Oral every 6 hours PRN  ALPRAZolam 0.25 milliGRAM(s) Oral every 8 hours PRN  diphenhydrAMINE 25 milliGRAM(s) Oral every 6 hours PRN  melatonin 3 milliGRAM(s) Oral at bedtime PRN  ondansetron Injectable 4 milliGRAM(s) IV Push every 8 hours PRN  oxyCODONE    IR 5 milliGRAM(s) Oral every 6 hours PRN      rivaroxaban 15 milliGRAM(s) Oral daily    famotidine    Tablet 20 milliGRAM(s) Oral daily  lactulose Syrup 20 Gram(s) Oral daily  polyethylene glycol 3350 17 Gram(s) Oral daily  senna 2 Tablet(s) Oral at bedtime      atorvastatin 80 milliGRAM(s) Oral at bedtime  dextrose 50% Injectable 25 Gram(s) IV Push once  dextrose 50% Injectable 12.5 Gram(s) IV Push once  dextrose 50% Injectable 25 Gram(s) IV Push once  dextrose Oral Gel 15 Gram(s) Oral once PRN  glucagon  Injectable 1 milliGRAM(s) IntraMuscular once  insulin glargine Injectable (LANTUS) 40 Unit(s) SubCutaneous every morning  insulin glargine Injectable (LANTUS) 40 Unit(s) SubCutaneous at bedtime  insulin lispro (ADMELOG) corrective regimen sliding scale   SubCutaneous three times a day before meals  insulin lispro Injectable (ADMELOG) 38 Unit(s) SubCutaneous three times a day before meals    dextrose 5%. 1000 milliLiter(s) IV Continuous <Continuous>  dextrose 5%. 1000 milliLiter(s) IV Continuous <Continuous>      chlorhexidine 2% Cloths 1 Application(s) Topical <User Schedule>  clotrimazole 1% Cream 1 Application(s) Topical two times a day  nystatin Powder 1 Application(s) Topical two times a day            ICU Vital Signs Last 24 Hrs  T(C): 36.7 (31 Dec 2023 12:00), Max: 36.8 (31 Dec 2023 04:55)  T(F): 98.1 (31 Dec 2023 12:00), Max: 98.3 (31 Dec 2023 04:55)  HR: 98 (31 Dec 2023 12:00) (74 - 112)  BP: 129/57 (31 Dec 2023 12:00) (92/49 - 133/55)  BP(mean): --  ABP: --  ABP(mean): --  RR: 18 (31 Dec 2023 12:00) (17 - 19)  SpO2: 97% (31 Dec 2023 12:00) (95% - 99%)    O2 Parameters below as of 31 Dec 2023 12:00  Patient On (Oxygen Delivery Method): nasal cannula  O2 Flow (L/min): 4              I&O's Detail    30 Dec 2023 07:01  -  31 Dec 2023 07:00  --------------------------------------------------------  IN:    Oral Fluid: 840 mL  Total IN: 840 mL    OUT:    Indwelling Catheter - Urethral (mL): 2750 mL  Total OUT: 2750 mL    Total NET: -1910 mL      31 Dec 2023 07:01  -  31 Dec 2023 14:41  --------------------------------------------------------  IN:    Oral Fluid: 400 mL  Total IN: 400 mL    OUT:    Indwelling Catheter - Urethral (mL): 2050 mL  Total OUT: 2050 mL    Total NET: -1650 mL            LABS:                        13.8   18.80 )-----------( 142      ( 31 Dec 2023 05:51 )             41.9     12-30    138  |  96  |  80.0<H>  ----------------------------<  259<H>  4.6   |  28.0  |  1.64<H>    Ca    9.3      30 Dec 2023 06:11            CAPILLARY BLOOD GLUCOSE      POCT Blood Glucose.: 106 mg/dL (31 Dec 2023 14:36)    PTT - ( 30 Dec 2023 06:11 )  PTT:26.5 sec  Urinalysis Basic - ( 30 Dec 2023 06:11 )    Color: x / Appearance: x / SG: x / pH: x  Gluc: 259 mg/dL / Ketone: x  / Bili: x / Urobili: x   Blood: x / Protein: x / Nitrite: x   Leuk Esterase: x / RBC: x / WBC x   Sq Epi: x / Non Sq Epi: x / Bacteria: x      CULTURES:  Rapid RVP Result: NotDetec (12-30 @ 16:30)  Culture Results:   No growth at 4 days (12-26 @ 09:31)  Culture Results:   No growth at 4 days (12-26 @ 09:21)      Physical Examination:    General: No acute distress.   in bed obese f    HEENT: Pupils equal, reactive to light.  Symmetric.    PULM: Clear to auscultation bilaterally, no significant sputum production    NECK: Supple, no lymphadenopathy, trachea midline    CVS: Regular rate and rhythm, no murmurs, rubs, or gallops    ABD: Soft, nondistended, nontender, normoactive bowel sounds, no masses    EXT:  mid edema  SKIN: Warm and well perfused, no rashes noted.    NEURO: Alert, oriented, interactive, nonfocal    DEVICES:     RADIOLOGY: *  PROCEDURE DATE:  12/30/2023          INTERPRETATION:  CLINICAL STATEMENT: Shortness of breath, heart failure    TECHNIQUE: AP view of the chest.      COMPARISON: 12/26/2023    Limited by patient positioning/rotation.    Heart size may be exaggerated by AP technique and patient body habitus.   Elevation right hemidiaphragm. Question mild pulmonary vascular   congestion. Otherwise, no focal lung consolidation, sizable effusion or   pneumothorax.    No significant osseous abnormality.    IMPRESSION:    Limitation as above.    Question mild pulmonary vascular congestion. No focal lung consolidation,   sizable effusion or pneumothorax.    --- End of Report ---            RAFAEL MIRANDA MD; Attending Radiologist  This document has been electronically signed. Dec 31 2023 12:21PM  **    CRITICAL CARE TIME SPENT: ***

## 2023-12-31 NOTE — PROGRESS NOTE ADULT - SUBJECTIVE AND OBJECTIVE BOX
Patient is a 68y old  Female who presents with a chief complaint of SOB (31 Dec 2023 08:07)      pt denies any sob,chest pain at rest.  No fever,chill  REVIEW OF SYSTEMS: All systems are reviewed and found to be negative except above    MEDICATIONS  (STANDING):  acetylcysteine 20%  Inhalation 4 milliLiter(s) Inhalation daily  atorvastatin 80 milliGRAM(s) Oral at bedtime  budesonide  80 MICROgram(s)/formoterol 4.5 MICROgram(s) Inhaler 2 Puff(s) Inhalation two times a day  chlorhexidine 2% Cloths 1 Application(s) Topical <User Schedule>  clotrimazole 1% Cream 1 Application(s) Topical two times a day  dextrose 5%. 1000 milliLiter(s) (50 mL/Hr) IV Continuous <Continuous>  dextrose 5%. 1000 milliLiter(s) (100 mL/Hr) IV Continuous <Continuous>  dextrose 50% Injectable 25 Gram(s) IV Push once  dextrose 50% Injectable 12.5 Gram(s) IV Push once  dextrose 50% Injectable 25 Gram(s) IV Push once  famotidine    Tablet 20 milliGRAM(s) Oral daily  glucagon  Injectable 1 milliGRAM(s) IntraMuscular once  insulin glargine Injectable (LANTUS) 40 Unit(s) SubCutaneous at bedtime  insulin glargine Injectable (LANTUS) 40 Unit(s) SubCutaneous every morning  insulin lispro (ADMELOG) corrective regimen sliding scale   SubCutaneous three times a day before meals  insulin lispro Injectable (ADMELOG) 38 Unit(s) SubCutaneous three times a day before meals  ipratropium    for Nebulization 500 MICROGram(s) Nebulizer every 6 hours  lactulose Syrup 20 Gram(s) Oral daily  levalbuterol Inhalation 0.63 milliGRAM(s) Inhalation every 6 hours  metoprolol tartrate 150 milliGRAM(s) Oral two times a day  nystatin Powder 1 Application(s) Topical two times a day  polyethylene glycol 3350 17 Gram(s) Oral daily  rivaroxaban 15 milliGRAM(s) Oral daily  senna 2 Tablet(s) Oral at bedtime  torsemide 20 milliGRAM(s) Oral daily    MEDICATIONS  (PRN):  acetaminophen     Tablet .. 650 milliGRAM(s) Oral every 6 hours PRN Temp greater or equal to 38C (100.4F), Mild Pain (1 - 3)  ALPRAZolam 0.25 milliGRAM(s) Oral every 8 hours PRN for anxiety/Sleep  dextrose Oral Gel 15 Gram(s) Oral once PRN Blood Glucose LESS THAN 70 milliGRAM(s)/deciliter  diphenhydrAMINE 25 milliGRAM(s) Oral every 6 hours PRN Rash and/or Itching  hydrALAZINE Injectable 10 milliGRAM(s) IV Push every 4 hours PRN for systolic bp > 160  melatonin 3 milliGRAM(s) Oral at bedtime PRN Insomnia  metoprolol tartrate Injectable 5 milliGRAM(s) IV Push every 6 hours PRN heart rate sustaining greater than 130  ondansetron Injectable 4 milliGRAM(s) IV Push every 8 hours PRN Nausea and/or Vomiting  oxyCODONE    IR 5 milliGRAM(s) Oral every 6 hours PRN Moderate Pain (4 - 6)      CAPILLARY BLOOD GLUCOSE      POCT Blood Glucose.: 169 mg/dL (31 Dec 2023 12:19)  POCT Blood Glucose.: 121 mg/dL (31 Dec 2023 08:34)  POCT Blood Glucose.: 172 mg/dL (30 Dec 2023 22:36)  POCT Blood Glucose.: 144 mg/dL (30 Dec 2023 18:46)  POCT Blood Glucose.: 105 mg/dL (30 Dec 2023 18:21)  POCT Blood Glucose.: 184 mg/dL (30 Dec 2023 17:15)  POCT Blood Glucose.: 128 mg/dL (30 Dec 2023 17:00)  POCT Blood Glucose.: 404 mg/dL (30 Dec 2023 14:23)    I&O's Summary    30 Dec 2023 07:01  -  31 Dec 2023 07:00  --------------------------------------------------------  IN: 840 mL / OUT: 2750 mL / NET: -1910 mL    31 Dec 2023 07:01  -  31 Dec 2023 13:24  --------------------------------------------------------  IN: 400 mL / OUT: 2050 mL / NET: -1650 mL        PHYSICAL EXAM:  Vital Signs Last 24 Hrs  T(C): 36.7 (31 Dec 2023 12:00), Max: 36.9 (30 Dec 2023 14:20)  T(F): 98.1 (31 Dec 2023 12:00), Max: 98.5 (30 Dec 2023 14:20)  HR: 98 (31 Dec 2023 12:00) (74 - 115)  BP: 129/57 (31 Dec 2023 12:00) (78/46 - 133/55)  BP(mean): --  RR: 18 (31 Dec 2023 12:00) (17 - 19)  SpO2: 97% (31 Dec 2023 12:00) (90% - 99%)    Parameters below as of 31 Dec 2023 12:00  Patient On (Oxygen Delivery Method): nasal cannula  O2 Flow (L/min): 4      CONSTITUTIONAL: NAD,  EYES: PERRLA; conjunctiva and sclera clear  ENMT: Moist oral mucosa,   RESPIRATORY: Normal respiratory effort; mild cracakles to auscultation bilaterally  CARDIOVASCULAR: Regular rate and rhythm, normal S1 and S2, no murmur   EXTS: No lower extremity edema; Peripheral pulses are 2+ bilaterally  ABDOMEN: Nontender to palpation, normoactive bowel sounds, no rebound/guarding;   MUSCLOSKELETAL: no joint swelling or tenderness to palpation  PSYCH: affect appropriate  NEUROLOGY: A+O to person, place, and time; CN 2-12 are intact and symmetric; no gross sensory deficits;       LABS:                        13.8   18.80 )-----------( 142      ( 31 Dec 2023 05:51 )             41.9     12-30    138  |  96  |  80.0<H>  ----------------------------<  259<H>  4.6   |  28.0  |  1.64<H>    Ca    9.3      30 Dec 2023 06:11      PTT - ( 30 Dec 2023 06:11 )  PTT:26.5 sec      Urinalysis Basic - ( 30 Dec 2023 06:11 )    Color: x / Appearance: x / SG: x / pH: x  Gluc: 259 mg/dL / Ketone: x  / Bili: x / Urobili: x   Blood: x / Protein: x / Nitrite: x   Leuk Esterase: x / RBC: x / WBC x   Sq Epi: x / Non Sq Epi: x / Bacteria: x          RADIOLOGY & ADDITIONAL TESTS:  Results Reviewed:

## 2023-12-31 NOTE — PROGRESS NOTE ADULT - ASSESSMENT
68 year old female with PMH of morbidly obesity (500+lbs), DM, HTN, HLD, asthma, HFpEF, chronic hypoxic respiratory failure on 4L home oxygen, CKD, spinal stenosis (cervical spine + lumbar spine) and hx of DVT presents with worsening SOB. . Nephrology is consulted for worsening JACQUES on CKD.     JACQUES on  CKD stage III renal function remains stable  -Baseline creatinine is ~1.6- 2.0 mg/dL in Oct 2022 (as per [facility])  -On admission, creatinine was1.4  - UA bland   - No hydro on CT 11/24/23     Renal function near baseline  s/p cardiac cath  premedicated    Acute on chronic HFpEF, NYHA III, ACC C  Bacterial pneumonia  Pulmonary HTN, Severe ( PASP 70 mmHg)  Pt hypervolemic on exam  Continue diuretics/ permissive azotemia  Component of elevated APOLONIA related to steroids  cont Torsemide 20 daily    AM labs will follow   68 year old female with PMH of morbidly obesity (500+lbs), DM, HTN, HLD, asthma, HFpEF, chronic hypoxic respiratory failure on 4L home oxygen, CKD, spinal stenosis (cervical spine + lumbar spine) and hx of DVT presents with worsening SOB. . Nephrology is consulted for worsening JACQUES on CKD.     JACQUES on  CKD stage III renal function remains stable  -Baseline creatinine is ~1.6- 2.0 mg/dL in Oct 2022 (as per [facility])  -On admission, creatinine was1.4  - UA bland   - No hydro on CT 11/24/23     Renal function near baseline  s/p cardiac cath  premedicated    Acute on chronic HFpEF, NYHA III, ACC C  Bacterial pneumonia  Pulmonary HTN, Severe ( PASP 70 mmHg)  Pt hypervolemic on exam  Continue diuretics/ permissive azotemia  Component of elevated APOLONIA related to steroids  cont Torsemide 20 daily    AM labs will follow

## 2024-01-01 ENCOUNTER — NON-APPOINTMENT (OUTPATIENT)
Age: 69
End: 2024-01-01

## 2024-01-01 LAB
ANION GAP SERPL CALC-SCNC: 17 MMOL/L — SIGNIFICANT CHANGE UP (ref 5–17)
ANION GAP SERPL CALC-SCNC: 17 MMOL/L — SIGNIFICANT CHANGE UP (ref 5–17)
BUN SERPL-MCNC: 74.1 MG/DL — HIGH (ref 8–20)
BUN SERPL-MCNC: 74.1 MG/DL — HIGH (ref 8–20)
CALCIUM SERPL-MCNC: 9.3 MG/DL — SIGNIFICANT CHANGE UP (ref 8.4–10.5)
CALCIUM SERPL-MCNC: 9.3 MG/DL — SIGNIFICANT CHANGE UP (ref 8.4–10.5)
CHLORIDE SERPL-SCNC: 94 MMOL/L — LOW (ref 96–108)
CHLORIDE SERPL-SCNC: 94 MMOL/L — LOW (ref 96–108)
CO2 SERPL-SCNC: 26 MMOL/L — SIGNIFICANT CHANGE UP (ref 22–29)
CO2 SERPL-SCNC: 26 MMOL/L — SIGNIFICANT CHANGE UP (ref 22–29)
CREAT SERPL-MCNC: 1.59 MG/DL — HIGH (ref 0.5–1.3)
CREAT SERPL-MCNC: 1.59 MG/DL — HIGH (ref 0.5–1.3)
EGFR: 35 ML/MIN/1.73M2 — LOW
EGFR: 35 ML/MIN/1.73M2 — LOW
GLUCOSE BLDC GLUCOMTR-MCNC: 131 MG/DL — HIGH (ref 70–99)
GLUCOSE BLDC GLUCOMTR-MCNC: 131 MG/DL — HIGH (ref 70–99)
GLUCOSE BLDC GLUCOMTR-MCNC: 138 MG/DL — HIGH (ref 70–99)
GLUCOSE BLDC GLUCOMTR-MCNC: 138 MG/DL — HIGH (ref 70–99)
GLUCOSE BLDC GLUCOMTR-MCNC: 160 MG/DL — HIGH (ref 70–99)
GLUCOSE BLDC GLUCOMTR-MCNC: 160 MG/DL — HIGH (ref 70–99)
GLUCOSE BLDC GLUCOMTR-MCNC: 164 MG/DL — HIGH (ref 70–99)
GLUCOSE BLDC GLUCOMTR-MCNC: 164 MG/DL — HIGH (ref 70–99)
GLUCOSE BLDC GLUCOMTR-MCNC: 180 MG/DL — HIGH (ref 70–99)
GLUCOSE BLDC GLUCOMTR-MCNC: 180 MG/DL — HIGH (ref 70–99)
GLUCOSE BLDC GLUCOMTR-MCNC: 52 MG/DL — CRITICAL LOW (ref 70–99)
GLUCOSE BLDC GLUCOMTR-MCNC: 52 MG/DL — CRITICAL LOW (ref 70–99)
GLUCOSE BLDC GLUCOMTR-MCNC: 59 MG/DL — LOW (ref 70–99)
GLUCOSE BLDC GLUCOMTR-MCNC: 59 MG/DL — LOW (ref 70–99)
GLUCOSE BLDC GLUCOMTR-MCNC: 83 MG/DL — SIGNIFICANT CHANGE UP (ref 70–99)
GLUCOSE BLDC GLUCOMTR-MCNC: 83 MG/DL — SIGNIFICANT CHANGE UP (ref 70–99)
GLUCOSE BLDC GLUCOMTR-MCNC: 88 MG/DL — SIGNIFICANT CHANGE UP (ref 70–99)
GLUCOSE BLDC GLUCOMTR-MCNC: 88 MG/DL — SIGNIFICANT CHANGE UP (ref 70–99)
GLUCOSE SERPL-MCNC: 124 MG/DL — HIGH (ref 70–99)
GLUCOSE SERPL-MCNC: 124 MG/DL — HIGH (ref 70–99)
HCT VFR BLD CALC: 42.5 % — SIGNIFICANT CHANGE UP (ref 34.5–45)
HCT VFR BLD CALC: 42.5 % — SIGNIFICANT CHANGE UP (ref 34.5–45)
HGB BLD-MCNC: 13.5 G/DL — SIGNIFICANT CHANGE UP (ref 11.5–15.5)
HGB BLD-MCNC: 13.5 G/DL — SIGNIFICANT CHANGE UP (ref 11.5–15.5)
MCHC RBC-ENTMCNC: 29.1 PG — SIGNIFICANT CHANGE UP (ref 27–34)
MCHC RBC-ENTMCNC: 29.1 PG — SIGNIFICANT CHANGE UP (ref 27–34)
MCHC RBC-ENTMCNC: 31.8 GM/DL — LOW (ref 32–36)
MCHC RBC-ENTMCNC: 31.8 GM/DL — LOW (ref 32–36)
MCV RBC AUTO: 91.6 FL — SIGNIFICANT CHANGE UP (ref 80–100)
MCV RBC AUTO: 91.6 FL — SIGNIFICANT CHANGE UP (ref 80–100)
PLATELET # BLD AUTO: 179 K/UL — SIGNIFICANT CHANGE UP (ref 150–400)
PLATELET # BLD AUTO: 179 K/UL — SIGNIFICANT CHANGE UP (ref 150–400)
POTASSIUM SERPL-MCNC: 4.7 MMOL/L — SIGNIFICANT CHANGE UP (ref 3.5–5.3)
POTASSIUM SERPL-MCNC: 4.7 MMOL/L — SIGNIFICANT CHANGE UP (ref 3.5–5.3)
POTASSIUM SERPL-SCNC: 4.7 MMOL/L — SIGNIFICANT CHANGE UP (ref 3.5–5.3)
POTASSIUM SERPL-SCNC: 4.7 MMOL/L — SIGNIFICANT CHANGE UP (ref 3.5–5.3)
RBC # BLD: 4.64 M/UL — SIGNIFICANT CHANGE UP (ref 3.8–5.2)
RBC # BLD: 4.64 M/UL — SIGNIFICANT CHANGE UP (ref 3.8–5.2)
RBC # FLD: 14.5 % — SIGNIFICANT CHANGE UP (ref 10.3–14.5)
RBC # FLD: 14.5 % — SIGNIFICANT CHANGE UP (ref 10.3–14.5)
SODIUM SERPL-SCNC: 137 MMOL/L — SIGNIFICANT CHANGE UP (ref 135–145)
SODIUM SERPL-SCNC: 137 MMOL/L — SIGNIFICANT CHANGE UP (ref 135–145)
WBC # BLD: 15.98 K/UL — HIGH (ref 3.8–10.5)
WBC # BLD: 15.98 K/UL — HIGH (ref 3.8–10.5)
WBC # FLD AUTO: 15.98 K/UL — HIGH (ref 3.8–10.5)
WBC # FLD AUTO: 15.98 K/UL — HIGH (ref 3.8–10.5)

## 2024-01-01 PROCEDURE — 99232 SBSQ HOSP IP/OBS MODERATE 35: CPT

## 2024-01-01 PROCEDURE — 93926 LOWER EXTREMITY STUDY: CPT | Mod: 26,RT

## 2024-01-01 PROCEDURE — 99221 1ST HOSP IP/OBS SF/LOW 40: CPT | Mod: GC

## 2024-01-01 RX ORDER — INSULIN GLARGINE 100 [IU]/ML
30 INJECTION, SOLUTION SUBCUTANEOUS EVERY MORNING
Refills: 0 | Status: DISCONTINUED | OUTPATIENT
Start: 2024-01-01 | End: 2024-01-04

## 2024-01-01 RX ORDER — RIVAROXABAN 15 MG-20MG
1 KIT ORAL
Qty: 0 | Refills: 0 | DISCHARGE
Start: 2024-01-01

## 2024-01-01 RX ORDER — LACTULOSE 10 G/15ML
1 SOLUTION ORAL
Refills: 0 | DISCHARGE

## 2024-01-01 RX ORDER — ROSUVASTATIN CALCIUM 5 MG/1
1 TABLET ORAL
Refills: 0 | DISCHARGE

## 2024-01-01 RX ORDER — LACTULOSE 10 G/15ML
30 SOLUTION ORAL
Qty: 0 | Refills: 0 | DISCHARGE
Start: 2024-01-01

## 2024-01-01 RX ORDER — ATORVASTATIN CALCIUM 80 MG/1
1 TABLET, FILM COATED ORAL
Qty: 0 | Refills: 0 | DISCHARGE
Start: 2024-01-01

## 2024-01-01 RX ORDER — POLYETHYLENE GLYCOL 3350 17 G/17G
17 POWDER, FOR SOLUTION ORAL
Qty: 0 | Refills: 0 | DISCHARGE
Start: 2024-01-01

## 2024-01-01 RX ORDER — DIBUCAINE 1 %
1 OINTMENT (GRAM) RECTAL
Refills: 0 | DISCHARGE

## 2024-01-01 RX ORDER — ACETYLCYSTEINE 200 MG/ML
4 VIAL (ML) MISCELLANEOUS
Qty: 0 | Refills: 0 | DISCHARGE
Start: 2024-01-01

## 2024-01-01 RX ORDER — ALPRAZOLAM 0.25 MG
1 TABLET ORAL
Qty: 0 | Refills: 0 | DISCHARGE
Start: 2024-01-01

## 2024-01-01 RX ORDER — DEXTROSE 50 % IN WATER 50 %
15 SYRINGE (ML) INTRAVENOUS ONCE
Refills: 0 | Status: COMPLETED | OUTPATIENT
Start: 2024-01-01 | End: 2024-01-01

## 2024-01-01 RX ORDER — INSULIN LISPRO 100/ML
25 VIAL (ML) SUBCUTANEOUS
Refills: 0 | Status: DISCONTINUED | OUTPATIENT
Start: 2024-01-01 | End: 2024-01-03

## 2024-01-01 RX ORDER — NYSTATIN CREAM 100000 [USP'U]/G
1 CREAM TOPICAL
Qty: 0 | Refills: 0 | DISCHARGE
Start: 2024-01-01

## 2024-01-01 RX ORDER — IPRATROPIUM/ALBUTEROL SULFATE 18-103MCG
3 AEROSOL WITH ADAPTER (GRAM) INHALATION
Refills: 0 | DISCHARGE

## 2024-01-01 RX ORDER — INSULIN LISPRO 100/ML
36 VIAL (ML) SUBCUTANEOUS
Qty: 0 | Refills: 0 | DISCHARGE
Start: 2024-01-01

## 2024-01-01 RX ORDER — LANOLIN ALCOHOL/MO/W.PET/CERES
1 CREAM (GRAM) TOPICAL
Qty: 0 | Refills: 0 | DISCHARGE
Start: 2024-01-01

## 2024-01-01 RX ORDER — OXYCODONE HYDROCHLORIDE 5 MG/1
30 TABLET ORAL
Refills: 0 | Status: DISCONTINUED | OUTPATIENT
Start: 2024-01-01 | End: 2024-01-02

## 2024-01-01 RX ORDER — BUDESONIDE AND FORMOTEROL FUMARATE DIHYDRATE 160; 4.5 UG/1; UG/1
2 AEROSOL RESPIRATORY (INHALATION)
Qty: 0 | Refills: 0 | DISCHARGE
Start: 2024-01-01

## 2024-01-01 RX ORDER — METOPROLOL TARTRATE 50 MG
1 TABLET ORAL
Qty: 0 | Refills: 0 | DISCHARGE
Start: 2024-01-01

## 2024-01-01 RX ORDER — FAMOTIDINE 10 MG/ML
1 INJECTION INTRAVENOUS
Qty: 0 | Refills: 0 | DISCHARGE
Start: 2024-01-01

## 2024-01-01 RX ORDER — LEVALBUTEROL 1.25 MG/.5ML
3 SOLUTION, CONCENTRATE RESPIRATORY (INHALATION)
Qty: 0 | Refills: 0 | DISCHARGE
Start: 2024-01-01

## 2024-01-01 RX ORDER — INSULIN LISPRO 100/ML
VIAL (ML) SUBCUTANEOUS
Refills: 0 | Status: DISCONTINUED | OUTPATIENT
Start: 2024-01-01 | End: 2024-01-08

## 2024-01-01 RX ORDER — DEXTROSE 50 % IN WATER 50 %
12.5 SYRINGE (ML) INTRAVENOUS ONCE
Refills: 0 | Status: COMPLETED | OUTPATIENT
Start: 2024-01-01 | End: 2024-01-01

## 2024-01-01 RX ORDER — ALBUMIN HUMAN 25 %
50 VIAL (ML) INTRAVENOUS ONCE
Refills: 0 | Status: COMPLETED | OUTPATIENT
Start: 2024-01-01 | End: 2024-01-01

## 2024-01-01 RX ORDER — ALBUTEROL 90 UG/1
2 AEROSOL, METERED ORAL
Refills: 0 | DISCHARGE

## 2024-01-01 RX ORDER — IPRATROPIUM BROMIDE 0.2 MG/ML
2.5 SOLUTION, NON-ORAL INHALATION
Qty: 0 | Refills: 0 | DISCHARGE
Start: 2024-01-01

## 2024-01-01 RX ORDER — POLYETHYLENE GLYCOL 3350 17 G/17G
17 POWDER, FOR SOLUTION ORAL
Refills: 0 | DISCHARGE

## 2024-01-01 RX ORDER — INSULIN GLARGINE 100 [IU]/ML
30 INJECTION, SOLUTION SUBCUTANEOUS AT BEDTIME
Refills: 0 | Status: DISCONTINUED | OUTPATIENT
Start: 2024-01-01 | End: 2024-01-04

## 2024-01-01 RX ORDER — METOPROLOL TARTRATE 50 MG
150 TABLET ORAL
Refills: 0 | Status: DISCONTINUED | OUTPATIENT
Start: 2024-01-01 | End: 2024-01-02

## 2024-01-01 RX ORDER — INSULIN LISPRO 100/ML
20 VIAL (ML) SUBCUTANEOUS
Refills: 0 | DISCHARGE

## 2024-01-01 RX ORDER — INSULIN GLARGINE 100 [IU]/ML
35 INJECTION, SOLUTION SUBCUTANEOUS
Refills: 0 | DISCHARGE

## 2024-01-01 RX ORDER — ACETAMINOPHEN 500 MG
2 TABLET ORAL
Qty: 0 | Refills: 0 | DISCHARGE
Start: 2024-01-01

## 2024-01-01 RX ORDER — INSULIN GLARGINE 100 [IU]/ML
34 INJECTION, SOLUTION SUBCUTANEOUS
Qty: 0 | Refills: 0 | DISCHARGE
Start: 2024-01-01

## 2024-01-01 RX ORDER — ALPRAZOLAM 0.25 MG
1 TABLET ORAL
Refills: 0 | DISCHARGE

## 2024-01-01 RX ORDER — FLUTICASONE PROPIONATE AND SALMETEROL 50; 250 UG/1; UG/1
0 POWDER ORAL; RESPIRATORY (INHALATION)
Qty: 0 | Refills: 1 | DISCHARGE

## 2024-01-01 RX ADMIN — LACTULOSE 20 GRAM(S): 10 SOLUTION ORAL at 09:26

## 2024-01-01 RX ADMIN — Medication 12.5 GRAM(S): at 15:26

## 2024-01-01 RX ADMIN — SENNA PLUS 2 TABLET(S): 8.6 TABLET ORAL at 22:55

## 2024-01-01 RX ADMIN — CHLORHEXIDINE GLUCONATE 1 APPLICATION(S): 213 SOLUTION TOPICAL at 07:42

## 2024-01-01 RX ADMIN — INSULIN GLARGINE 30 UNIT(S): 100 INJECTION, SOLUTION SUBCUTANEOUS at 22:54

## 2024-01-01 RX ADMIN — Medication 5 MILLIGRAM(S): at 13:28

## 2024-01-01 RX ADMIN — POLYETHYLENE GLYCOL 3350 17 GRAM(S): 17 POWDER, FOR SOLUTION ORAL at 09:33

## 2024-01-01 RX ADMIN — Medication 1 APPLICATION(S): at 07:07

## 2024-01-01 RX ADMIN — OXYCODONE HYDROCHLORIDE 5 MILLIGRAM(S): 5 TABLET ORAL at 12:06

## 2024-01-01 RX ADMIN — Medication 4 MILLILITER(S): at 09:01

## 2024-01-01 RX ADMIN — ONDANSETRON 4 MILLIGRAM(S): 8 TABLET, FILM COATED ORAL at 21:44

## 2024-01-01 RX ADMIN — BUDESONIDE AND FORMOTEROL FUMARATE DIHYDRATE 2 PUFF(S): 160; 4.5 AEROSOL RESPIRATORY (INHALATION) at 09:02

## 2024-01-01 RX ADMIN — LEVALBUTEROL 0.63 MILLIGRAM(S): 1.25 SOLUTION, CONCENTRATE RESPIRATORY (INHALATION) at 20:21

## 2024-01-01 RX ADMIN — LEVALBUTEROL 0.63 MILLIGRAM(S): 1.25 SOLUTION, CONCENTRATE RESPIRATORY (INHALATION) at 09:01

## 2024-01-01 RX ADMIN — OXYCODONE HYDROCHLORIDE 5 MILLIGRAM(S): 5 TABLET ORAL at 13:06

## 2024-01-01 RX ADMIN — OXYCODONE HYDROCHLORIDE 30 MILLIGRAM(S): 5 TABLET ORAL at 19:00

## 2024-01-01 RX ADMIN — Medication 4: at 12:01

## 2024-01-01 RX ADMIN — ATORVASTATIN CALCIUM 80 MILLIGRAM(S): 80 TABLET, FILM COATED ORAL at 22:55

## 2024-01-01 RX ADMIN — LEVALBUTEROL 0.63 MILLIGRAM(S): 1.25 SOLUTION, CONCENTRATE RESPIRATORY (INHALATION) at 04:23

## 2024-01-01 RX ADMIN — Medication 15 GRAM(S): at 15:28

## 2024-01-01 RX ADMIN — Medication 500 MICROGRAM(S): at 20:20

## 2024-01-01 RX ADMIN — Medication 500 MICROGRAM(S): at 04:23

## 2024-01-01 RX ADMIN — Medication 1 APPLICATION(S): at 18:31

## 2024-01-01 RX ADMIN — FAMOTIDINE 20 MILLIGRAM(S): 10 INJECTION INTRAVENOUS at 09:23

## 2024-01-01 RX ADMIN — Medication 500 MICROGRAM(S): at 14:51

## 2024-01-01 RX ADMIN — NYSTATIN CREAM 1 APPLICATION(S): 100000 CREAM TOPICAL at 07:08

## 2024-01-01 RX ADMIN — OXYCODONE HYDROCHLORIDE 30 MILLIGRAM(S): 5 TABLET ORAL at 18:38

## 2024-01-01 RX ADMIN — RIVAROXABAN 15 MILLIGRAM(S): KIT at 09:23

## 2024-01-01 RX ADMIN — Medication 20 MILLIGRAM(S): at 07:43

## 2024-01-01 RX ADMIN — Medication 150 MILLIGRAM(S): at 15:08

## 2024-01-01 RX ADMIN — Medication 50 MILLILITER(S): at 22:56

## 2024-01-01 RX ADMIN — Medication 38 UNIT(S): at 09:23

## 2024-01-01 RX ADMIN — LEVALBUTEROL 0.63 MILLIGRAM(S): 1.25 SOLUTION, CONCENTRATE RESPIRATORY (INHALATION) at 14:51

## 2024-01-01 RX ADMIN — NYSTATIN CREAM 1 APPLICATION(S): 100000 CREAM TOPICAL at 18:31

## 2024-01-01 RX ADMIN — Medication 500 MICROGRAM(S): at 09:01

## 2024-01-01 RX ADMIN — OXYCODONE HYDROCHLORIDE 60 MILLIGRAM(S): 5 TABLET ORAL at 07:06

## 2024-01-01 RX ADMIN — INSULIN GLARGINE 40 UNIT(S): 100 INJECTION, SOLUTION SUBCUTANEOUS at 09:23

## 2024-01-01 RX ADMIN — Medication 38 UNIT(S): at 12:01

## 2024-01-01 NOTE — PROGRESS NOTE ADULT - SUBJECTIVE AND OBJECTIVE BOX
Patient is a 68y old  Female who presents with a chief complaint of SOB (01 Jan 2024 08:43)      pt is c/o chronic back pain,asking for pain meds  denies cp,sob,dizziness,palpitation.  meds was hold for low bp.  REVIEW OF SYSTEMS: All systems are reviewed and found to be negative except above    MEDICATIONS  (STANDING):  acetylcysteine 20%  Inhalation 4 milliLiter(s) Inhalation daily  atorvastatin 80 milliGRAM(s) Oral at bedtime  budesonide  80 MICROgram(s)/formoterol 4.5 MICROgram(s) Inhaler 2 Puff(s) Inhalation two times a day  chlorhexidine 2% Cloths 1 Application(s) Topical <User Schedule>  clotrimazole 1% Cream 1 Application(s) Topical two times a day  dextrose 5%. 1000 milliLiter(s) (100 mL/Hr) IV Continuous <Continuous>  dextrose 5%. 1000 milliLiter(s) (50 mL/Hr) IV Continuous <Continuous>  dextrose 50% Injectable 12.5 Gram(s) IV Push once  dextrose 50% Injectable 25 Gram(s) IV Push once  dextrose 50% Injectable 25 Gram(s) IV Push once  famotidine    Tablet 20 milliGRAM(s) Oral daily  glucagon  Injectable 1 milliGRAM(s) IntraMuscular once  insulin glargine Injectable (LANTUS) 40 Unit(s) SubCutaneous every morning  insulin glargine Injectable (LANTUS) 40 Unit(s) SubCutaneous at bedtime  insulin lispro (ADMELOG) corrective regimen sliding scale   SubCutaneous three times a day before meals  insulin lispro Injectable (ADMELOG) 38 Unit(s) SubCutaneous three times a day before meals  ipratropium    for Nebulization 500 MICROGram(s) Nebulizer every 6 hours  lactulose Syrup 20 Gram(s) Oral daily  levalbuterol Inhalation 0.63 milliGRAM(s) Inhalation every 6 hours  metoprolol tartrate 150 milliGRAM(s) Oral two times a day  nystatin Powder 1 Application(s) Topical two times a day  oxyCODONE    IR 30 milliGRAM(s) Oral two times a day  polyethylene glycol 3350 17 Gram(s) Oral daily  rivaroxaban 15 milliGRAM(s) Oral daily  senna 2 Tablet(s) Oral at bedtime  torsemide 20 milliGRAM(s) Oral daily    MEDICATIONS  (PRN):  acetaminophen     Tablet .. 650 milliGRAM(s) Oral every 6 hours PRN Temp greater or equal to 38C (100.4F), Mild Pain (1 - 3)  ALPRAZolam 0.25 milliGRAM(s) Oral every 8 hours PRN for anxiety/Sleep  dextrose Oral Gel 15 Gram(s) Oral once PRN Blood Glucose LESS THAN 70 milliGRAM(s)/deciliter  diphenhydrAMINE 25 milliGRAM(s) Oral every 6 hours PRN Rash and/or Itching  hydrALAZINE Injectable 10 milliGRAM(s) IV Push every 4 hours PRN for systolic bp > 160  melatonin 3 milliGRAM(s) Oral at bedtime PRN Insomnia  metoprolol tartrate Injectable 5 milliGRAM(s) IV Push every 6 hours PRN heart rate sustaining greater than 130  ondansetron Injectable 4 milliGRAM(s) IV Push every 8 hours PRN Nausea and/or Vomiting  oxyCODONE    IR 5 milliGRAM(s) Oral every 6 hours PRN Moderate Pain (4 - 6)      CAPILLARY BLOOD GLUCOSE      POCT Blood Glucose.: 180 mg/dL (01 Jan 2024 11:54)  POCT Blood Glucose.: 131 mg/dL (01 Jan 2024 08:30)  POCT Blood Glucose.: 116 mg/dL (31 Dec 2023 21:07)  POCT Blood Glucose.: 82 mg/dL (31 Dec 2023 17:23)  POCT Blood Glucose.: 106 mg/dL (31 Dec 2023 14:36)    I&O's Summary    31 Dec 2023 07:01  -  01 Jan 2024 07:00  --------------------------------------------------------  IN: 600 mL / OUT: 3400 mL / NET: -2800 mL    01 Jan 2024 07:01  -  01 Jan 2024 14:07  --------------------------------------------------------  IN: 0 mL / OUT: 650 mL / NET: -650 mL        PHYSICAL EXAM:  Vital Signs Last 24 Hrs  T(C): 36.5 (01 Jan 2024 09:22), Max: 36.5 (01 Jan 2024 04:24)  T(F): 97.7 (01 Jan 2024 09:22), Max: 97.7 (01 Jan 2024 04:24)  HR: 140 (01 Jan 2024 13:25) (65 - 140)  BP: 103/60 (01 Jan 2024 13:25) (96/66 - 134/74)  BP(mean): 79 (31 Dec 2023 16:30) (79 - 79)  RR: 18 (01 Jan 2024 13:25) (18 - 20)  SpO2: 99% (01 Jan 2024 13:25) (94% - 100%)    Parameters below as of 01 Jan 2024 13:25  Patient On (Oxygen Delivery Method): nasal cannula        CONSTITUTIONAL: NAD,  EYES: PERRLA; conjunctiva and sclera clear  ENMT: Moist oral mucosa,   RESPIRATORY: Normal respiratory effort; lungs are clear to auscultation bilaterally  CARDIOVASCULAR: Regular rate and rhythm, normal S1 and S2, no murmur   EXTS: chronic lower extremity edema; Peripheral pulses are 2+ bilaterally  ABDOMEN: Nontender to palpation, normoactive bowel sounds, no rebound/guarding;   MUSCLOSKELETAL:   no joint swelling or tenderness to palpation  PSYCH: affect appropriate  NEUROLOGY: A+O to person, place, and time; CN 2-12 are intact and symmetric; no gross sensory deficits;       LABS:                        13.5   15.98 )-----------( 179      ( 01 Jan 2024 06:25 )             42.5     01-01    137  |  94<L>  |  74.1<H>  ----------------------------<  124<H>  4.7   |  26.0  |  1.59<H>    Ca    9.3      01 Jan 2024 06:25            Urinalysis Basic - ( 01 Jan 2024 06:25 )    Color: x / Appearance: x / SG: x / pH: x  Gluc: 124 mg/dL / Ketone: x  / Bili: x / Urobili: x   Blood: x / Protein: x / Nitrite: x   Leuk Esterase: x / RBC: x / WBC x   Sq Epi: x / Non Sq Epi: x / Bacteria: x          RADIOLOGY & ADDITIONAL TESTS:  Results Reviewed:

## 2024-01-01 NOTE — CHART NOTE - NSCHARTNOTEFT_GEN_A_CORE
RN called for cold RLE. Pt c/o RLE pain that started today. Pt denies numbness and tingling. Pt can feel painful stimuli b/l, however difficulty moving RLE. On exam, LLE +2 pulses, on RLE, DPA is +1, PTA +2. Both LE with some discoloration, shiny in appearance, around lower legs and down to the ankles. Concern for arterial insufficiency, discussed with Nephrology and Vascular Surgery, given JACQUES will obtain arterial duplex first. RN called for cold RLE. Pt c/o RLE pain that started today. Pt denies numbness and tingling. Pt can feel painful stimuli b/l, however difficulty moving RLE. . Both LE with some discoloration, shiny in appearance, around lower legs and down to the ankles. Concern for arterial insufficiency, discussed with Nephrology and Vascular Surgery, given JACQUES will obtain arterial duplex first. RN called for cold RLE. Pt c/o RLE pain that started today. Pt denies numbness and tingling. Pt can feel painful stimuli b/l, however difficulty moving RLE. Difficult to palpate pulses on RLE, DPA weak compared to LLE on doppler. Both LE with some discoloration, shiny in appearance, around lower legs and down to the ankles. Concern for arterial insufficiency, discussed with Nephrology and Vascular Surgery, given JACQUSE will obtain arterial duplex first. Consulted vascular surgery. Will endorse to night team. MD aware. RN called for cold RLE. Pt c/o RLE pain that started today. Pt denies numbness and tingling. Pt can feel painful stimuli b/l, however difficulty moving RLE. Difficult to palpate pulses on RLE, DPA weak compared to LLE on doppler. Both LE with some discoloration, shiny in appearance, around lower legs and down to the ankles. Concern for arterial insufficiency, discussed with Nephrology and Vascular Surgery, given JACQUES will obtain arterial duplex first. Consulted vascular surgery. Will endorse to night team. MD aware.

## 2024-01-01 NOTE — PROGRESS NOTE ADULT - NUTRITIONAL ASSESSMENT
This patient has been assessed with a concern for Malnutrition and has been determined to have a diagnosis/diagnoses of Severe protein-calorie malnutrition and Morbid obesity (BMI > 40).    This patient is being managed with:   Diet Consistent Carbohydrate w/Evening Snack-  1500mL Fluid Restriction ([id number])  Low Sodium  Entered: Dec 19 2023  6:23PM   This patient has been assessed with a concern for Malnutrition and has been determined to have a diagnosis/diagnoses of Severe protein-calorie malnutrition and Morbid obesity (BMI > 40).    This patient is being managed with:   Diet Consistent Carbohydrate w/Evening Snack-  1500mL Fluid Restriction ([id number])  Low Sodium  Entered: Dec 19 2023  6:23PM

## 2024-01-01 NOTE — CHART NOTE - NSCHARTNOTEFT_GEN_A_CORE
PA NOTE-MEDICINE    Signed out by day shift to follow up on Vascular consult requested due to Right Cold Lower extremity.  Arterial Doppler done with Preliminary read by Sono tech as Neg.  Sono Tech is forwarding exam to radiology for official read.  Vascular surgery called to inform of Arterial Doppler done so consult can be done.

## 2024-01-01 NOTE — PROGRESS NOTE ADULT - NUTRITIONAL ASSESSMENT
This patient has been assessed with a concern for Malnutrition and has been determined to have a diagnosis/diagnoses of Severe protein-calorie malnutrition and Morbid obesity (BMI > 40).    This patient is being managed with:   Diet Consistent Carbohydrate w/Evening Snack-  1500mL Fluid Restriction ([id number])  Low Sodium  Entered: Dec 19 2023  6:23PM   This patient has been assessed with a concern for Malnutrition and has been determined to have a diagnosis/diagnoses of Severe protein-calorie malnutrition and Morbid obesity (BMI > 40).    This patient is being managed with:   Diet Consistent Carbohydrate w/Evening Snack-  1500mL Fluid Restriction ([id number])  Low Sodium  Entered: Dec 19 2023  6:23PM

## 2024-01-01 NOTE — CONSULT NOTE ADULT - ASSESSMENT
69 yo F with multiple comorbidities, concerned for colder R foot > L.   On physical exam foot is colder with good cap refill and triphasic doppler signals.  Arterial US obtained without evidence of stenosis, thrombus, or flow limiting lesion.    Plan:  No vascular surgery intervention required  Continue medical optimization  Call if any questions

## 2024-01-01 NOTE — PROGRESS NOTE ADULT - SUBJECTIVE AND OBJECTIVE BOX
Follow up: diabetes  Interval Hx/Events: hypoglycemia today    MEDICATIONS  (STANDING):  acetylcysteine 20%  Inhalation 4 milliLiter(s) Inhalation daily  atorvastatin 80 milliGRAM(s) Oral at bedtime  budesonide  80 MICROgram(s)/formoterol 4.5 MICROgram(s) Inhaler 2 Puff(s) Inhalation two times a day  chlorhexidine 2% Cloths 1 Application(s) Topical <User Schedule>  clotrimazole 1% Cream 1 Application(s) Topical two times a day  dextrose 5%. 1000 milliLiter(s) (100 mL/Hr) IV Continuous <Continuous>  dextrose 5%. 1000 milliLiter(s) (50 mL/Hr) IV Continuous <Continuous>  dextrose 50% Injectable 25 Gram(s) IV Push once  dextrose 50% Injectable 12.5 Gram(s) IV Push once  dextrose 50% Injectable 25 Gram(s) IV Push once  famotidine    Tablet 20 milliGRAM(s) Oral daily  glucagon  Injectable 1 milliGRAM(s) IntraMuscular once  insulin glargine Injectable (LANTUS) 40 Unit(s) SubCutaneous every morning  insulin glargine Injectable (LANTUS) 40 Unit(s) SubCutaneous at bedtime  insulin lispro (ADMELOG) corrective regimen sliding scale   SubCutaneous three times a day before meals  insulin lispro Injectable (ADMELOG) 38 Unit(s) SubCutaneous three times a day before meals  ipratropium    for Nebulization 500 MICROGram(s) Nebulizer every 6 hours  lactulose Syrup 20 Gram(s) Oral daily  levalbuterol Inhalation 0.63 milliGRAM(s) Inhalation every 6 hours  metoprolol tartrate 150 milliGRAM(s) Oral two times a day  nystatin Powder 1 Application(s) Topical two times a day  oxyCODONE    IR 30 milliGRAM(s) Oral two times a day  polyethylene glycol 3350 17 Gram(s) Oral daily  rivaroxaban 15 milliGRAM(s) Oral daily  senna 2 Tablet(s) Oral at bedtime  torsemide 20 milliGRAM(s) Oral daily    MEDICATIONS  (PRN):  acetaminophen     Tablet .. 650 milliGRAM(s) Oral every 6 hours PRN Temp greater or equal to 38C (100.4F), Mild Pain (1 - 3)  ALPRAZolam 0.25 milliGRAM(s) Oral every 8 hours PRN for anxiety/Sleep  dextrose Oral Gel 15 Gram(s) Oral once PRN Blood Glucose LESS THAN 70 milliGRAM(s)/deciliter  diphenhydrAMINE 25 milliGRAM(s) Oral every 6 hours PRN Rash and/or Itching  hydrALAZINE Injectable 10 milliGRAM(s) IV Push every 4 hours PRN for systolic bp > 160  melatonin 3 milliGRAM(s) Oral at bedtime PRN Insomnia  metoprolol tartrate Injectable 5 milliGRAM(s) IV Push every 6 hours PRN heart rate sustaining greater than 130  ondansetron Injectable 4 milliGRAM(s) IV Push every 8 hours PRN Nausea and/or Vomiting  oxyCODONE    IR 5 milliGRAM(s) Oral every 6 hours PRN Moderate Pain (4 - 6)      ALLERGIES: wool- rash, itch (Other)  adhesives (Rash)  latex (Rash)  Bactrim (Flushing)    Vital Signs Last 24 Hrs  T(C): 36.5 (01 Jan 2024 09:22), Max: 36.5 (01 Jan 2024 04:24)  T(F): 97.7 (01 Jan 2024 09:22), Max: 97.7 (01 Jan 2024 04:24)  HR: 109 (01 Jan 2024 15:11) (65 - 140)  BP: 111/70 (01 Jan 2024 15:11) (96/66 - 125/56)  BP(mean): 79 (31 Dec 2023 16:30) (79 - 79)  RR: 18 (01 Jan 2024 15:11) (18 - 20)  SpO2: 94% (01 Jan 2024 15:11) (94% - 100%)    Parameters below as of 01 Jan 2024 15:11  Patient On (Oxygen Delivery Method): nasal cannula  O2 Flow (L/min): 4      Physical Exam:  General: No apparent distress, obese  Respiratory: Lungs clear bilaterally, normal rate, effort  Cardiac: +S1, S2, no m/r/g  GI: +BS, soft, non tender  Extremities: LE redness/dryness, (+) edema  Neuro: A+O X3    LABS:                        13.5   15.98 )-----------( 179      ( 01 Jan 2024 06:25 )             42.5     01-01    137  |  94<L>  |  74.1<H>  ----------------------------<  124<H>  4.7   |  26.0  |  1.59<H>    Ca    9.3      01 Jan 2024 06:25          A1C with Estimated Average Glucose Result: 8.3 % (12-17-23 @ 08:20)  A1C with Estimated Average Glucose Result: 7.6 % (11-25-23 @ 09:00)  A1C with Estimated Average Glucose Result: 7.2 % (09-17-23 @ 16:13)  A1C with Estimated Average Glucose Result: 6.5 % (08-31-23 @ 10:25)      CAPILLARY BLOOD GLUCOSE  POCT Blood Glucose.: 160 mg/dL (01 Jan 2024 15:29)  POCT Blood Glucose.: 52 mg/dL (01 Jan 2024 15:19)  POCT Blood Glucose.: 59 mg/dL (01 Jan 2024 15:16)  POCT Blood Glucose.: 180 mg/dL (01 Jan 2024 11:54)  POCT Blood Glucose.: 131 mg/dL (01 Jan 2024 08:30)  POCT Blood Glucose.: 116 mg/dL (31 Dec 2023 21:07)  POCT Blood Glucose.: 82 mg/dL (31 Dec 2023 17:23)  POCT Blood Glucose.: 106 mg/dL (31 Dec 2023 14:36)  POCT Blood Glucose.: 169 mg/dL (31 Dec 2023 12:19)  POCT Blood Glucose.: 121 mg/dL (31 Dec 2023 08:34)  POCT Blood Glucose.: 172 mg/dL (30 Dec 2023 22:36)  POCT Blood Glucose.: 144 mg/dL (30 Dec 2023 18:46)  POCT Blood Glucose.: 105 mg/dL (30 Dec 2023 18:21)  POCT Blood Glucose.: 184 mg/dL (30 Dec 2023 17:15)  POCT Blood Glucose.: 128 mg/dL (30 Dec 2023 17:00)      Thyroid Stimulating Hormone, Serum: 1.42 uIU/mL [0.27 - 4.20] (12-29-23)

## 2024-01-01 NOTE — PROGRESS NOTE ADULT - ASSESSMENT
67 yo female with HTN, HLD, DM2, HFpEF, CKD III, DVT, Uterine CA, COPD on home o2 who presented with complaints of shortness of breath. Patient reports that she has been sick all week and was recently started on Vantin and steroids while at the nursing home. Pt admitted for acute on chronic respiratory failure sec to Acute HFpEF exacerbation with likely with Superimposed bacterial Pneumonia in setting of RSV, Severe pulm HTN, new Afib..CT scan which also showed pneumonia and pulmonary HTN. S/P C 12/29 with mild LAD disease,pulm htn. Pt was wean off from BIPAP,now on NC. and was given IV lasix,Started on heparin drip and changed to Xarelto after cath.    New afib RVR  - hr stable  -  loprssor 150mg q12hr  - Xarelto 15 mg qd per renal function  - EP consulted, no DCCV given resp issues  - F/U EP rec  - TTE W or WO Ultrasound Enhancing Agent (12.18.23)EF 60 to 65%. There is moderate (grade 2) left ventricular diastolic dysfunction, with normal filling pressure.      # Acute on chronic respiratory failure  # Acute HFpEF exacerbation   # Superimposed bacterial Pneumonia in setting of RSV  # Severe pulm HTN  - so2 4 L.home 4 L NC  - CT chest reads worsening middle lobe PNA  - S/p [facility] mild LAD disease/ Severe pulmonary HTN 70 / LVEDP stable  - sputum clx ordered for last several days, pt unable to bring up sputum  - repeat MRSA PCR negative  - ID consulted, WBC likely reactive to steroids  - antibiotics stopped on 12/28, WBC trended down  - pt high risk for recurrent aspiration  - repeat blood clx NGTD  - S/P  steroids   - levalbuterol and ipratropium q6hrs, symbicort BID   - Torsemide  - BIPAP qhs  - f/u pulm rec  -F/U Cardio rec    # previous suspicion for PE/ DVT on admission  - Patient has empirically been treated for PE/DVT since August  - No scans noted to have PE/DVT  - duplex lower ext to without dvt   - d dimer negative    # JACQUES- improving  - likely combination of pre-renal and ATN  - lasix on hold  - will pre-hydrate before and after cath  - nephrology consulted  - trend BMP    #Chronic pain  - continue home Pain meds of Oxycodone 60 mg q12h and Oxycodone 5mg q6h PRN    # HLD  - Atorvastatin for rosuvastatin    # COPD  -oxygen supplement  - taper off  steroids   - Symbicort, Spiriva, albuterol  - pulm following     #DM  #steroid induced hyperglycemia  -High BG  -A1C 8.3  - lantus 40 u bid  -increase 38 u meal coverage  -lispro sliding scale  -f/u endocrine rec    DVT prophylaxis: Xarelto   Dispo: ROSE VELA Pt 69 yo female with HTN, HLD, DM2, HFpEF, CKD III, DVT, Uterine CA, COPD on home o2 who presented with complaints of shortness of breath. Patient reports that she has been sick all week and was recently started on Vantin and steroids while at the nursing home. Pt admitted for acute on chronic respiratory failure sec to Acute HFpEF exacerbation with likely with Superimposed bacterial Pneumonia in setting of RSV, Severe pulm HTN, new Afib..CT scan which also showed pneumonia and pulmonary HTN. S/P C 12/29 with mild LAD disease,pulm htn. Pt was wean off from BIPAP,now on NC. and was given IV lasix,Started on heparin drip and changed to Xarelto after cath.    New afib RVR  - hr stable  -  loprssor 150mg q12hr  - Xarelto 15 mg qd per renal function  - EP consulted, no DCCV given resp issues  - F/U EP rec  - TTE W or WO Ultrasound Enhancing Agent (12.18.23)EF 60 to 65%. There is moderate (grade 2) left ventricular diastolic dysfunction, with normal filling pressure.      # Acute on chronic respiratory failure  # Acute HFpEF exacerbation   # Superimposed bacterial Pneumonia in setting of RSV  # Severe pulm HTN  - so2 4 L.home 4 L NC  - CT chest reads worsening middle lobe PNA  - S/p [facility] mild LAD disease/ Severe pulmonary HTN 70 / LVEDP stable  - sputum clx ordered for last several days, pt unable to bring up sputum  - repeat MRSA PCR negative  - ID consulted, WBC likely reactive to steroids  - antibiotics stopped on 12/28, WBC trended down  - pt high risk for recurrent aspiration  - repeat blood clx NGTD  - S/P  steroids   - levalbuterol and ipratropium q6hrs, symbicort BID   - Torsemide  - BIPAP qhs  - f/u pulm rec  -F/U Cardio rec    # previous suspicion for PE/ DVT on admission  - Patient has empirically been treated for PE/DVT since August  - No scans noted to have PE/DVT  - duplex lower ext to without dvt   - d dimer negative    # JACQUES- improving  - likely combination of pre-renal and ATN  - lasix on hold  - will pre-hydrate before and after cath  - nephrology consulted  - trend BMP    #Chronic pain  - continue home Pain meds of Oxycodone 60 mg q12h and Oxycodone 5mg q6h PRN    # HLD  - Atorvastatin for rosuvastatin    # COPD  -oxygen supplement  - taper off  steroids   - Symbicort, Spiriva, albuterol  - pulm following     #DM  #steroid induced hyperglycemia  -High BG  -A1C 8.3  - lantus 40 u bid  -increase 38 u meal coverage  -lispro sliding scale  -f/u endocrine rec    DVT prophylaxis: Xarelto   Dispo: ROSE VELA Pt 67 yo female with HTN, HLD, DM2, HFpEF, CKD III, DVT, Uterine CA, COPD on home o2 who presented with complaints of shortness of breath. Patient reports that she has been sick all week and was recently started on Vantin and steroids while at the nursing home. Pt admitted for acute on chronic respiratory failure sec to Acute HFpEF exacerbation with likely with Superimposed bacterial Pneumonia in setting of RSV, Severe pulm HTN, new Afib..CT scan which also showed pneumonia and pulmonary HTN. S/P [facility] 12/29 with mild LAD disease,pulm htn. Pt was wean off from BIPAP,now on NC. and was given IV lasix,Started on heparin drip and changed to Xarelto after cath.    New afib RVR  - hr high  - bb was hold am for low bp  - will give stat dose of bb  - on lopressor 150mg q12hr  - Xarelto 15 mg qd per renal function  - EP consulted, no DCCV given resp issues  - F/U EP rec  - TTE W or WO Ultrasound Enhancing Agent (12.18.23)EF 60 to 65%. There is moderate (grade 2) left ventricular diastolic dysfunction, with normal filling pressure.      # Acute on chronic respiratory failure  # Acute HFpEF exacerbation   # Superimposed bacterial Pneumonia in setting of RSV  # Severe pulm HTN  - so2 4 L.home 4 L NC  - CT chest reads worsening middle lobe PNA  - S/p [facility] mild LAD disease/ Severe pulmonary HTN 70 / LVEDP stable  - sputum clx ordered for last several days, pt unable to bring up sputum  - repeat MRSA PCR negative  - ID consulted, WBC likely reactive to steroids  - antibiotics stopped on 12/28, WBC trended down  - pt high risk for recurrent aspiration  - repeat blood clx NGTD  - S/P  steroids   - levalbuterol and ipratropium q6hrs, symbicort BID   - Torsemide  - BIPAP qhs  - f/u pulm rec  -F/U Cardio rec    # previous suspicion for PE/ DVT on admission  - Patient has empirically been treated for PE/DVT since August  - No scans noted to have PE/DVT  - duplex lower ext to without dvt   - d dimer negative    # JACQUES- improving  - likely combination of pre-renal and ATN  - lasix on hold  - will pre-hydrate before and after cath  - nephrology consulted  - trend BMP    #Chronic pain  - continue home Pain meds of Oxycodone 60 mg q12h and Oxycodone 5mg q6h PRN  -cut down oxycodone 30 mg bid as bp soft    # HLD  - Atorvastatin for rosuvastatin    # COPD  -oxygen supplement  - taper off  steroids   - Symbicort, Spiriva, albuterol  - pulm following     #DM  #steroid induced hyperglycemia  -High BG  -A1C 8.3  - lantus 40 u bid  -increase 38 u meal coverage  -lispro sliding scale  -f/u endocrine rec    DVT prophylaxis: Xarelto   Dispo: ROSE navarro rn 69 yo female with HTN, HLD, DM2, HFpEF, CKD III, DVT, Uterine CA, COPD on home o2 who presented with complaints of shortness of breath. Patient reports that she has been sick all week and was recently started on Vantin and steroids while at the nursing home. Pt admitted for acute on chronic respiratory failure sec to Acute HFpEF exacerbation with likely with Superimposed bacterial Pneumonia in setting of RSV, Severe pulm HTN, new Afib..CT scan which also showed pneumonia and pulmonary HTN. S/P [facility] 12/29 with mild LAD disease,pulm htn. Pt was wean off from BIPAP,now on NC. and was given IV lasix,Started on heparin drip and changed to Xarelto after cath.    New afib RVR  - hr high  - bb was hold am for low bp  - will give stat dose of bb  - on lopressor 150mg q12hr  - Xarelto 15 mg qd per renal function  - EP consulted, no DCCV given resp issues  - F/U EP rec  - TTE W or WO Ultrasound Enhancing Agent (12.18.23)EF 60 to 65%. There is moderate (grade 2) left ventricular diastolic dysfunction, with normal filling pressure.      # Acute on chronic respiratory failure  # Acute HFpEF exacerbation   # Superimposed bacterial Pneumonia in setting of RSV  # Severe pulm HTN  - so2 4 L.home 4 L NC  - CT chest reads worsening middle lobe PNA  - S/p [facility] mild LAD disease/ Severe pulmonary HTN 70 / LVEDP stable  - sputum clx ordered for last several days, pt unable to bring up sputum  - repeat MRSA PCR negative  - ID consulted, WBC likely reactive to steroids  - antibiotics stopped on 12/28, WBC trended down  - pt high risk for recurrent aspiration  - repeat blood clx NGTD  - S/P  steroids   - levalbuterol and ipratropium q6hrs, symbicort BID   - Torsemide  - BIPAP qhs  - f/u pulm rec  -F/U Cardio rec    # previous suspicion for PE/ DVT on admission  - Patient has empirically been treated for PE/DVT since August  - No scans noted to have PE/DVT  - duplex lower ext to without dvt   - d dimer negative    # JACQUES- improving  - likely combination of pre-renal and ATN  - lasix on hold  - will pre-hydrate before and after cath  - nephrology consulted  - trend BMP    #Chronic pain  - continue home Pain meds of Oxycodone 60 mg q12h and Oxycodone 5mg q6h PRN  -cut down oxycodone 30 mg bid as bp soft    # HLD  - Atorvastatin for rosuvastatin    # COPD  -oxygen supplement  - taper off  steroids   - Symbicort, Spiriva, albuterol  - pulm following     #DM  #steroid induced hyperglycemia  -High BG  -A1C 8.3  - lantus 40 u bid  -increase 38 u meal coverage  -lispro sliding scale  -f/u endocrine rec    DVT prophylaxis: Xarelto   Dispo: ROSE navarro rn

## 2024-01-01 NOTE — PROGRESS NOTE ADULT - ASSESSMENT
69 yo female with HTN, HLD, DM2, HFpEF, CKD III, DVT, Uterine CA, COPD on home o2 who presented with complaints of shortness of breath. Patient reports that she has been sick all week and was recently started on Vantin and steroids while at the nursing home. Patient reports that her dyspnea just worsened and she told the nursing home to send her in for an evaluation. While in the ED, patient was placed on BIPAP and was given IV lasix with some improvement. RVP then results as RSV +. Patient had a CT scan which also showed pneumonia and pulmonary HTN. Patient was started on IV zosyn x 10 days as well as IV steroids tapered to po. Due to uptrending WBC and persistent o2 requirements concern for worsening pneumonia (likely expected radiographic progression) and abx broadened to vanco and golden 12/26, patient was tapered down to 5l o2 the same day ? anxiety component. BCX 12/26 ngtd, repeat UA neg. No diarrhea, phlebitis      Acute on chronic respiratory failure  Leukocytosis on steroids  RSV with superimposed bacterial pneumonia  Pulmonary hypertension  ? PE  HFPEF  CKD  COPD on home o2  Morbid obesity      patient with RSV with suspected superimposed bacterial pneumonia overall improved since admission even prior to broadening abx, now back to baseline o2 requirements- low suspicion for MRSA pneumonia  dyspnea-multifactorial. High risk for recurrent pneumonia  Leukocytosis likely reactive in the setting of steroids  procal not reliable in renal failure  stable off abx, continue to monitor  Send sputum cx if able  repeat CXR 12/30/23 with no consolidation         Will sign off. Please call PRN.

## 2024-01-01 NOTE — CONSULT NOTE ADULT - CONSULT REQUESTED DATE/TIME
26-Dec-2023 16:36
17-Dec-2023 13:39
16-Dec-2023
28-Dec-2023 12:23
01-Jan-2024 19:37
19-Dec-2023 20:04
20-Dec-2023 12:03
16-Dec-2023 15:52

## 2024-01-01 NOTE — CONSULT NOTE ADULT - CONSULT REASON
DM management
dyspnea, chest pain
bipap
Respiratory distress/BiPAP dependency
Cold R foot
Ned on CKD
pneumonia
HFpEF

## 2024-01-01 NOTE — PROGRESS NOTE ADULT - ASSESSMENT
67 yo female with HTN, HLD, DM2, HFpEF, CKD III, DVT, Uterine CA, COPD on home o2 who presented with complaints of shortness of breath. Admitted with acute on chronic resp failure and acute HFpEF exacerbation with likely with Superimposed bacterial Pneumonia in setting of RSV and new onset AFib and worsening GFR. S/p C with mild LAD disease, Severe pulmonary HTN, LVEDP stable    1. T2DM complicated by CKD - hypoglycemia now off high dose steroids  - Decrease lantus to 30 units BID  - Decrease premeal admelog to 25 units TID with meals  - Continue sliding scale coverage but change to moderate dose    2. HLD  - Continue statin    3. Atrial fibrillation  - TSH normal  - on lopressor 150mg q12hr  - Xarelto 15 mg qd per renal function  - EP consulted, no DCCV given resp issues  -

## 2024-01-01 NOTE — CONSULT NOTE ADULT - SUBJECTIVE AND OBJECTIVE BOX
VASCULAR SURGERY CONSULT  HPI reviewed as below.  69 yo F with multiple comorbidities admitted to medicine with CHFpEF exacerbation.  Ambulatory with walker at baseline  On Xarelto for A-fib  Patient complained of R hip pain today and nurse felt that the R foot was colder than the L.  Patient denies new pain to the L foot, recent non-healing wounds, reports that neuropathy remains stable.  Vascular surgery consulted for concern of acute limb ischemia to the R foot.       ==============================================================================================================  HPI: 68y Female  HPI:  69 yo female with HTN, HLD, DM2, HFpEF, CKD III, DVT, Uterine CA, COPD on home o2 who presented with complaints of shortness of breath. Patient reports that she has been sick all week and was recently started on Vantin and steroids while at the nursing home. Patient reports that her dyspnea just worsened and she told the nursing home to send her in for an evaluation. While in the ED, patient was placed on BIPAP and was given IV lasix with some improvement. RVP then results as RSV +. Patient had a CT scan which also showed pneumonia and pulmonary HTN. Admission to medicine was requested for further management. (16 Dec 2023 16:12)      PAST MEDICAL & SURGICAL HISTORY:  Diabetes Mellitus Type II      Endometrial Hyperplasia      Cervical Stenosis of Spine      Spinal Stenosis, Lumbar      Deep Vein Thrombosis (DVT)  Left leg, 2004, treated and resolved      Dyslipidemia      Cataract      Morbid Obesity      Vitamin D deficiency      Insomnia      CKD (chronic kidney disease)  ~ III      Congestive heart failure  ~ HFpEF      Uterine cancer      Asthma      On home oxygen therapy      Gait difficulty  ~ u ses walker      Cataract extracted with lens implant 1998  Right      C Section 1994      Cholecystectomy/appendectomy @ age 25      D&C x2 1980's      D&C 2008  hysteroscopy, endometrial hyperplasia, 2009      Cervical Spinal Stenosis surgery x2 (01/2002, 7/2002)      Tonsillectomy as a child      Endometrial biopsy 12/02/09      H/O laser iridotomy  left eye, 2016      H/O colonoscopy  1998      S/P total abdominal hysterectomy and bilateral salpingo-oophorectomy      S/P appendectomy      S/P cholecystectomy        Home Meds: Home Medications:  acetaminophen 325 mg oral tablet: 2 tab(s) orally every 6 hours As needed Temp greater or equal to 38C (100.4F), Mild Pain (1 - 3) (01 Jan 2024 08:39)  acetylcysteine 20% inhalation solution: 4 milliliter(s) inhaled once a day (01 Jan 2024 08:39)  ALPRAZolam 0.25 mg oral tablet: 1 tab(s) orally every 8 hours As needed for anxiety/Sleep (01 Jan 2024 08:39)  atorvastatin 80 mg oral tablet: 1 tab(s) orally once a day (at bedtime) (01 Jan 2024 08:39)  budesonide-formoterol 80 mcg-4.5 mcg/inh inhalation aerosol: 2 puff(s) inhaled 2 times a day (01 Jan 2024 08:39)  clotrimazole 1% topical cream: 1 Apply topically to affected area 2 times a day (01 Jan 2024 08:39)  diphenhydrAMINE 25 mg oral tablet: 1 tab(s) orally 3 times a day as needed for  rash (16 Dec 2023 15:35)  famotidine 20 mg oral tablet: 1 tab(s) orally once a day (01 Jan 2024 08:39)  insulin glargine 100 units/mL subcutaneous solution: 40 unit(s) subcutaneous 2 times a day (01 Jan 2024 08:39)  insulin lispro 100 units/mL injectable solution: 37 unit(s) subcutaneous 3 times a day 15 mins before meal (01 Jan 2024 08:39)  ipratropium 500 mcg/2.5 mL inhalation solution: 2.5 milliliter(s) inhaled every 6 hours (01 Jan 2024 08:39)  lactulose 10 g/15 mL oral syrup: 30 milliliter(s) orally once a day (01 Jan 2024 08:39)  levalbuterol 0.63 mg/3 mL inhalation solution: 3 milliliter(s) inhaled every 6 hours (01 Jan 2024 08:39)  melatonin 3 mg oral tablet: 1 tab(s) orally once a day (at bedtime) As needed Insomnia (01 Jan 2024 08:39)  metoprolol tartrate 75 mg oral tablet: 2 tab(s) orally 2 times a day (01 Jan 2024 08:39)  nystatin 100,000 units/g topical powder: 1 Apply topically to affected area 2 times a day (01 Jan 2024 08:39)  oxyCODONE 5 mg oral tablet: 1 tab(s) orally every 6 hours As needed Moderate Pain (4 - 6) (16 Dec 2023 15:36)  oxyCODONE 60 mg oral tablet, extended release: 1 tab(s) orally 2 times a day (16 Dec 2023 15:21)  polyethylene glycol 3350 oral powder for reconstitution: 17 gram(s) orally once a day (01 Jan 2024 08:39)  rivaroxaban 15 mg oral tablet: 1 tab(s) orally once a day (01 Jan 2024 08:39)  senna leaf extract oral tablet: 2 tab(s) orally once a day (at bedtime) (16 Dec 2023 15:21)    Allergies: Allergies    wool- rash, itch (Other)  adhesives (Rash)  latex (Rash)  Bactrim (Flushing)    Intolerances      Soc:   Advanced Directives: Presumed Full Code     CURRENT MEDICATIONS:   --------------------------------------------------------------------------------------  Neurologic Medications  acetaminophen     Tablet .. 650 milliGRAM(s) Oral every 6 hours PRN Temp greater or equal to 38C (100.4F), Mild Pain (1 - 3)  ALPRAZolam 0.25 milliGRAM(s) Oral every 8 hours PRN for anxiety/Sleep  diphenhydrAMINE 25 milliGRAM(s) Oral every 6 hours PRN Rash and/or Itching  melatonin 3 milliGRAM(s) Oral at bedtime PRN Insomnia  ondansetron Injectable 4 milliGRAM(s) IV Push every 8 hours PRN Nausea and/or Vomiting  oxyCODONE    IR 30 milliGRAM(s) Oral two times a day  oxyCODONE    IR 5 milliGRAM(s) Oral every 6 hours PRN Moderate Pain (4 - 6)    Respiratory Medications  acetylcysteine 20%  Inhalation 4 milliLiter(s) Inhalation daily  budesonide  80 MICROgram(s)/formoterol 4.5 MICROgram(s) Inhaler 2 Puff(s) Inhalation two times a day  ipratropium    for Nebulization 500 MICROGram(s) Nebulizer every 6 hours  levalbuterol Inhalation 0.63 milliGRAM(s) Inhalation every 6 hours    Cardiovascular Medications  hydrALAZINE Injectable 10 milliGRAM(s) IV Push every 4 hours PRN for systolic bp > 160  metoprolol tartrate 150 milliGRAM(s) Oral two times a day  metoprolol tartrate Injectable 5 milliGRAM(s) IV Push every 6 hours PRN heart rate sustaining greater than 130  torsemide 20 milliGRAM(s) Oral daily    Gastrointestinal Medications  dextrose 5%. 1000 milliLiter(s) IV Continuous <Continuous>  dextrose 5%. 1000 milliLiter(s) IV Continuous <Continuous>  famotidine    Tablet 20 milliGRAM(s) Oral daily  lactulose Syrup 20 Gram(s) Oral daily  polyethylene glycol 3350 17 Gram(s) Oral daily  senna 2 Tablet(s) Oral at bedtime    Genitourinary Medications    Hematologic/Oncologic Medications  rivaroxaban 15 milliGRAM(s) Oral daily    Antimicrobial/Immunologic Medications    Endocrine/Metabolic Medications  atorvastatin 80 milliGRAM(s) Oral at bedtime  dextrose 50% Injectable 12.5 Gram(s) IV Push once  dextrose 50% Injectable 25 Gram(s) IV Push once  dextrose 50% Injectable 25 Gram(s) IV Push once  dextrose Oral Gel 15 Gram(s) Oral once PRN Blood Glucose LESS THAN 70 milliGRAM(s)/deciliter  glucagon  Injectable 1 milliGRAM(s) IntraMuscular once  insulin glargine Injectable (LANTUS) 30 Unit(s) SubCutaneous at bedtime  insulin glargine Injectable (LANTUS) 30 Unit(s) SubCutaneous every morning  insulin lispro (ADMELOG) corrective regimen sliding scale   SubCutaneous three times a day before meals  insulin lispro Injectable (ADMELOG) 25 Unit(s) SubCutaneous three times a day before meals    Topical/Other Medications  chlorhexidine 2% Cloths 1 Application(s) Topical <User Schedule>  clotrimazole 1% Cream 1 Application(s) Topical two times a day  nystatin Powder 1 Application(s) Topical two times a day    --------------------------------------------------------------------------------------    VITAL SIGNS, INS/OUTS (last 24 hours):  --------------------------------------------------------------------------------------  ICU Vital Signs Last 24 Hrs  T(C): 36.4 (01 Jan 2024 16:57), Max: 36.5 (01 Jan 2024 04:24)  T(F): 97.6 (01 Jan 2024 16:57), Max: 97.7 (01 Jan 2024 04:24)  HR: 106 (01 Jan 2024 18:30) (65 - 140)  BP: 108/55 (01 Jan 2024 18:30) (95/62 - 113/68)  BP(mean): --  ABP: --  ABP(mean): --  RR: 18 (01 Jan 2024 18:30) (18 - 20)  SpO2: 96% (01 Jan 2024 18:30) (94% - 100%)    O2 Parameters below as of 01 Jan 2024 18:30  Patient On (Oxygen Delivery Method): nasal cannula  O2 Flow (L/min): 4        I&O's Summary    31 Dec 2023 07:01  -  01 Jan 2024 07:00  --------------------------------------------------------  IN: 600 mL / OUT: 3400 mL / NET: -2800 mL    01 Jan 2024 07:01  -  01 Jan 2024 19:37  --------------------------------------------------------  IN: 0 mL / OUT: 650 mL / NET: -650 mL      --------------------------------------------------------------------------------------    PHYSICAL EXAM:  GENERAL: NAD  HEAD:  Atraumatic  EYES: EOMI, PERRLA, conjunctiva and sclera clear  NECK: Supple, No JVD  CHEST/LUNG: short of breath on NC  HEART: Iregular rate and rhythm  VASCULAR:  RLE: Unable to palpate pedal pulses. Triphasic DP and PT on doppler. Cap refill 2s. No open wounds.  LLE: Palpable DP, triphasic PT.     LABS  --------------------------------------------------------------------------------------  Labs:  CAPILLARY BLOOD GLUCOSE      POCT Blood Glucose.: 83 mg/dL (01 Jan 2024 18:34)  POCT Blood Glucose.: 88 mg/dL (01 Jan 2024 17:05)  POCT Blood Glucose.: 160 mg/dL (01 Jan 2024 15:29)  POCT Blood Glucose.: 52 mg/dL (01 Jan 2024 15:19)  POCT Blood Glucose.: 59 mg/dL (01 Jan 2024 15:16)  POCT Blood Glucose.: 180 mg/dL (01 Jan 2024 11:54)  POCT Blood Glucose.: 131 mg/dL (01 Jan 2024 08:30)  POCT Blood Glucose.: 116 mg/dL (31 Dec 2023 21:07)                          13.5   15.98 )-----------( 179      ( 01 Jan 2024 06:25 )             42.5         01-01    137  |  94<L>  |  74.1<H>  ----------------------------<  124<H>  4.7   |  26.0  |  1.59<H>      Calcium: 9.3 mg/dL (01-01-24 @ 06:25)      LFTs:         Coags:            Urinalysis Basic - ( 01 Jan 2024 06:25 )    Color: x / Appearance: x / SG: x / pH: x  Gluc: 124 mg/dL / Ketone: x  / Bili: x / Urobili: x   Blood: x / Protein: x / Nitrite: x   Leuk Esterase: x / RBC: x / WBC x   Sq Epi: x / Non Sq Epi: x / Bacteria: x          --------------------------------------------------------------------------------------     VASCULAR SURGERY CONSULT  HPI reviewed as below.  67 yo F with multiple comorbidities admitted to medicine with CHFpEF exacerbation.  Ambulatory with walker at baseline  On Xarelto for A-fib  Patient complained of R hip pain today and nurse felt that the R foot was colder than the L.  Patient denies new pain to the L foot, recent non-healing wounds, reports that neuropathy remains stable.  Vascular surgery consulted for concern of acute limb ischemia to the R foot.       ==============================================================================================================  HPI: 68y Female  HPI:  67 yo female with HTN, HLD, DM2, HFpEF, CKD III, DVT, Uterine CA, COPD on home o2 who presented with complaints of shortness of breath. Patient reports that she has been sick all week and was recently started on Vantin and steroids while at the nursing home. Patient reports that her dyspnea just worsened and she told the nursing home to send her in for an evaluation. While in the ED, patient was placed on BIPAP and was given IV lasix with some improvement. RVP then results as RSV +. Patient had a CT scan which also showed pneumonia and pulmonary HTN. Admission to medicine was requested for further management. (16 Dec 2023 16:12)      PAST MEDICAL & SURGICAL HISTORY:  Diabetes Mellitus Type II      Endometrial Hyperplasia      Cervical Stenosis of Spine      Spinal Stenosis, Lumbar      Deep Vein Thrombosis (DVT)  Left leg, 2004, treated and resolved      Dyslipidemia      Cataract      Morbid Obesity      Vitamin D deficiency      Insomnia      CKD (chronic kidney disease)  ~ III      Congestive heart failure  ~ HFpEF      Uterine cancer      Asthma      On home oxygen therapy      Gait difficulty  ~ u ses walker      Cataract extracted with lens implant 1998  Right      C Section 1994      Cholecystectomy/appendectomy @ age 25      D&C x2 1980's      D&C 2008  hysteroscopy, endometrial hyperplasia, 2009      Cervical Spinal Stenosis surgery x2 (01/2002, 7/2002)      Tonsillectomy as a child      Endometrial biopsy 12/02/09      H/O laser iridotomy  left eye, 2016      H/O colonoscopy  1998      S/P total abdominal hysterectomy and bilateral salpingo-oophorectomy      S/P appendectomy      S/P cholecystectomy        Home Meds: Home Medications:  acetaminophen 325 mg oral tablet: 2 tab(s) orally every 6 hours As needed Temp greater or equal to 38C (100.4F), Mild Pain (1 - 3) (01 Jan 2024 08:39)  acetylcysteine 20% inhalation solution: 4 milliliter(s) inhaled once a day (01 Jan 2024 08:39)  ALPRAZolam 0.25 mg oral tablet: 1 tab(s) orally every 8 hours As needed for anxiety/Sleep (01 Jan 2024 08:39)  atorvastatin 80 mg oral tablet: 1 tab(s) orally once a day (at bedtime) (01 Jan 2024 08:39)  budesonide-formoterol 80 mcg-4.5 mcg/inh inhalation aerosol: 2 puff(s) inhaled 2 times a day (01 Jan 2024 08:39)  clotrimazole 1% topical cream: 1 Apply topically to affected area 2 times a day (01 Jan 2024 08:39)  diphenhydrAMINE 25 mg oral tablet: 1 tab(s) orally 3 times a day as needed for  rash (16 Dec 2023 15:35)  famotidine 20 mg oral tablet: 1 tab(s) orally once a day (01 Jan 2024 08:39)  insulin glargine 100 units/mL subcutaneous solution: 40 unit(s) subcutaneous 2 times a day (01 Jan 2024 08:39)  insulin lispro 100 units/mL injectable solution: 37 unit(s) subcutaneous 3 times a day 15 mins before meal (01 Jan 2024 08:39)  ipratropium 500 mcg/2.5 mL inhalation solution: 2.5 milliliter(s) inhaled every 6 hours (01 Jan 2024 08:39)  lactulose 10 g/15 mL oral syrup: 30 milliliter(s) orally once a day (01 Jan 2024 08:39)  levalbuterol 0.63 mg/3 mL inhalation solution: 3 milliliter(s) inhaled every 6 hours (01 Jan 2024 08:39)  melatonin 3 mg oral tablet: 1 tab(s) orally once a day (at bedtime) As needed Insomnia (01 Jan 2024 08:39)  metoprolol tartrate 75 mg oral tablet: 2 tab(s) orally 2 times a day (01 Jan 2024 08:39)  nystatin 100,000 units/g topical powder: 1 Apply topically to affected area 2 times a day (01 Jan 2024 08:39)  oxyCODONE 5 mg oral tablet: 1 tab(s) orally every 6 hours As needed Moderate Pain (4 - 6) (16 Dec 2023 15:36)  oxyCODONE 60 mg oral tablet, extended release: 1 tab(s) orally 2 times a day (16 Dec 2023 15:21)  polyethylene glycol 3350 oral powder for reconstitution: 17 gram(s) orally once a day (01 Jan 2024 08:39)  rivaroxaban 15 mg oral tablet: 1 tab(s) orally once a day (01 Jan 2024 08:39)  senna leaf extract oral tablet: 2 tab(s) orally once a day (at bedtime) (16 Dec 2023 15:21)    Allergies: Allergies    wool- rash, itch (Other)  adhesives (Rash)  latex (Rash)  Bactrim (Flushing)    Intolerances      Soc:   Advanced Directives: Presumed Full Code     CURRENT MEDICATIONS:   --------------------------------------------------------------------------------------  Neurologic Medications  acetaminophen     Tablet .. 650 milliGRAM(s) Oral every 6 hours PRN Temp greater or equal to 38C (100.4F), Mild Pain (1 - 3)  ALPRAZolam 0.25 milliGRAM(s) Oral every 8 hours PRN for anxiety/Sleep  diphenhydrAMINE 25 milliGRAM(s) Oral every 6 hours PRN Rash and/or Itching  melatonin 3 milliGRAM(s) Oral at bedtime PRN Insomnia  ondansetron Injectable 4 milliGRAM(s) IV Push every 8 hours PRN Nausea and/or Vomiting  oxyCODONE    IR 30 milliGRAM(s) Oral two times a day  oxyCODONE    IR 5 milliGRAM(s) Oral every 6 hours PRN Moderate Pain (4 - 6)    Respiratory Medications  acetylcysteine 20%  Inhalation 4 milliLiter(s) Inhalation daily  budesonide  80 MICROgram(s)/formoterol 4.5 MICROgram(s) Inhaler 2 Puff(s) Inhalation two times a day  ipratropium    for Nebulization 500 MICROGram(s) Nebulizer every 6 hours  levalbuterol Inhalation 0.63 milliGRAM(s) Inhalation every 6 hours    Cardiovascular Medications  hydrALAZINE Injectable 10 milliGRAM(s) IV Push every 4 hours PRN for systolic bp > 160  metoprolol tartrate 150 milliGRAM(s) Oral two times a day  metoprolol tartrate Injectable 5 milliGRAM(s) IV Push every 6 hours PRN heart rate sustaining greater than 130  torsemide 20 milliGRAM(s) Oral daily    Gastrointestinal Medications  dextrose 5%. 1000 milliLiter(s) IV Continuous <Continuous>  dextrose 5%. 1000 milliLiter(s) IV Continuous <Continuous>  famotidine    Tablet 20 milliGRAM(s) Oral daily  lactulose Syrup 20 Gram(s) Oral daily  polyethylene glycol 3350 17 Gram(s) Oral daily  senna 2 Tablet(s) Oral at bedtime    Genitourinary Medications    Hematologic/Oncologic Medications  rivaroxaban 15 milliGRAM(s) Oral daily    Antimicrobial/Immunologic Medications    Endocrine/Metabolic Medications  atorvastatin 80 milliGRAM(s) Oral at bedtime  dextrose 50% Injectable 12.5 Gram(s) IV Push once  dextrose 50% Injectable 25 Gram(s) IV Push once  dextrose 50% Injectable 25 Gram(s) IV Push once  dextrose Oral Gel 15 Gram(s) Oral once PRN Blood Glucose LESS THAN 70 milliGRAM(s)/deciliter  glucagon  Injectable 1 milliGRAM(s) IntraMuscular once  insulin glargine Injectable (LANTUS) 30 Unit(s) SubCutaneous at bedtime  insulin glargine Injectable (LANTUS) 30 Unit(s) SubCutaneous every morning  insulin lispro (ADMELOG) corrective regimen sliding scale   SubCutaneous three times a day before meals  insulin lispro Injectable (ADMELOG) 25 Unit(s) SubCutaneous three times a day before meals    Topical/Other Medications  chlorhexidine 2% Cloths 1 Application(s) Topical <User Schedule>  clotrimazole 1% Cream 1 Application(s) Topical two times a day  nystatin Powder 1 Application(s) Topical two times a day    --------------------------------------------------------------------------------------    VITAL SIGNS, INS/OUTS (last 24 hours):  --------------------------------------------------------------------------------------  ICU Vital Signs Last 24 Hrs  T(C): 36.4 (01 Jan 2024 16:57), Max: 36.5 (01 Jan 2024 04:24)  T(F): 97.6 (01 Jan 2024 16:57), Max: 97.7 (01 Jan 2024 04:24)  HR: 106 (01 Jan 2024 18:30) (65 - 140)  BP: 108/55 (01 Jan 2024 18:30) (95/62 - 113/68)  BP(mean): --  ABP: --  ABP(mean): --  RR: 18 (01 Jan 2024 18:30) (18 - 20)  SpO2: 96% (01 Jan 2024 18:30) (94% - 100%)    O2 Parameters below as of 01 Jan 2024 18:30  Patient On (Oxygen Delivery Method): nasal cannula  O2 Flow (L/min): 4        I&O's Summary    31 Dec 2023 07:01  -  01 Jan 2024 07:00  --------------------------------------------------------  IN: 600 mL / OUT: 3400 mL / NET: -2800 mL    01 Jan 2024 07:01  -  01 Jan 2024 19:37  --------------------------------------------------------  IN: 0 mL / OUT: 650 mL / NET: -650 mL      --------------------------------------------------------------------------------------    PHYSICAL EXAM:  GENERAL: NAD  HEAD:  Atraumatic  EYES: EOMI, PERRLA, conjunctiva and sclera clear  NECK: Supple, No JVD  CHEST/LUNG: short of breath on NC  HEART: Iregular rate and rhythm  VASCULAR:  RLE: Unable to palpate pedal pulses. Triphasic DP and PT on doppler. Cap refill 2s. No open wounds.  LLE: Palpable DP, triphasic PT.     LABS  --------------------------------------------------------------------------------------  Labs:  CAPILLARY BLOOD GLUCOSE      POCT Blood Glucose.: 83 mg/dL (01 Jan 2024 18:34)  POCT Blood Glucose.: 88 mg/dL (01 Jan 2024 17:05)  POCT Blood Glucose.: 160 mg/dL (01 Jan 2024 15:29)  POCT Blood Glucose.: 52 mg/dL (01 Jan 2024 15:19)  POCT Blood Glucose.: 59 mg/dL (01 Jan 2024 15:16)  POCT Blood Glucose.: 180 mg/dL (01 Jan 2024 11:54)  POCT Blood Glucose.: 131 mg/dL (01 Jan 2024 08:30)  POCT Blood Glucose.: 116 mg/dL (31 Dec 2023 21:07)                          13.5   15.98 )-----------( 179      ( 01 Jan 2024 06:25 )             42.5         01-01    137  |  94<L>  |  74.1<H>  ----------------------------<  124<H>  4.7   |  26.0  |  1.59<H>      Calcium: 9.3 mg/dL (01-01-24 @ 06:25)      LFTs:         Coags:            Urinalysis Basic - ( 01 Jan 2024 06:25 )    Color: x / Appearance: x / SG: x / pH: x  Gluc: 124 mg/dL / Ketone: x  / Bili: x / Urobili: x   Blood: x / Protein: x / Nitrite: x   Leuk Esterase: x / RBC: x / WBC x   Sq Epi: x / Non Sq Epi: x / Bacteria: x          --------------------------------------------------------------------------------------

## 2024-01-01 NOTE — PROGRESS NOTE ADULT - SUBJECTIVE AND OBJECTIVE BOX
[facility]                                                INFECTIOUS DISEASES  =======================================================                   Cal George#   Martin Huber MD#    Esther Ceballos MD*                           Noreen Mairn MD*   Sheba Miller MD*           Diplomates American Board of Internal Medicine & Infectious Diseases                  # [place] Office - Appt - Tel  [phone] Fax [phone]                * [place] Office - Appt - Tel [phone] Fax [phone]                                  Hospital Consult line:  [phone]  =======================================================      [id number]  AGUEDA LUIS   follow up for: pneumonia, leukocytosis  no fever  pt feels ok- anxious about procedure  + cough, unable to bring up sputum  dyspnea better  no chest pain  no diarrhea  patient seen and examined.         REVIEW OF SYSTEMS:  CONSTITUTIONAL:  No Fever or chills  HEENT:  No diplopia or blurred vision.  No earache, sore throat or runny nose.  CARDIOVASCULAR:  No pressure, squeezing, strangling, tightness, heaviness or aching about the chest, neck, axilla or epigastrium.  RESPIRATORY:  + cough, shortness of breath  GASTROINTESTINAL:  No nausea, vomiting or diarrhea.  GENITOURINARY:  No dysuria, frequency or urgency. No Blood in urine  MUSCULOSKELETAL:  no joint aches, no muscle pain  SKIN:  No change in skin, hair or nails.  NEUROLOGIC:  No Headaches      Allergies  wool- rash, itch (Other)  adhesives (Rash)  latex (Rash)  Bactrim (Flushing)      Physical Exam:  General:  No acute distress. morbidly obese laying in bed, on o2  Eye: no conjunctival pallor, no scleral icterus  HENT: Normocephalic, No pharyngeal erythema, No sinus tenderness.  Neck: Supple, No lymphadenopathy.  Respiratory: coarse BS rt>left to auscultation, Respirations are non-labored.  Cardiovascular: Normal rate, Regular rhythm,  s1+s2  Gastrointestinal: Soft, Non-tender, Non-distended, Normal bowel sounds  Integumentary: b/l chronic venous stasis dermatitis changes, b/l pitting edema, dry skin      Vitals:  T(F): 97.7 (01 Jan 2024 09:22), Max: 97.7 (01 Jan 2024 04:24)  HR: 140 (01 Jan 2024 13:25)  BP: 103/60 (01 Jan 2024 13:25)  RR: 18 (01 Jan 2024 13:25)  SpO2: 99% (01 Jan 2024 13:25) (94% - 100%)  temp max in last 48H T(F): , Max: 98.5 (12-30-23 @ 14:20)      Current Antibiotics:    Other medications:  acetylcysteine 20%  Inhalation 4 milliLiter(s) Inhalation daily  atorvastatin 80 milliGRAM(s) Oral at bedtime  budesonide  80 MICROgram(s)/formoterol 4.5 MICROgram(s) Inhaler 2 Puff(s) Inhalation two times a day  chlorhexidine 2% Cloths 1 Application(s) Topical <User Schedule>  clotrimazole 1% Cream 1 Application(s) Topical two times a day  dextrose 5%. 1000 milliLiter(s) IV Continuous <Continuous>  dextrose 5%. 1000 milliLiter(s) IV Continuous <Continuous>  dextrose 50% Injectable 12.5 Gram(s) IV Push once  dextrose 50% Injectable 25 Gram(s) IV Push once  dextrose 50% Injectable 25 Gram(s) IV Push once  famotidine    Tablet 20 milliGRAM(s) Oral daily  glucagon  Injectable 1 milliGRAM(s) IntraMuscular once  insulin glargine Injectable (LANTUS) 40 Unit(s) SubCutaneous at bedtime  insulin glargine Injectable (LANTUS) 40 Unit(s) SubCutaneous every morning  insulin lispro (ADMELOG) corrective regimen sliding scale   SubCutaneous three times a day before meals  insulin lispro Injectable (ADMELOG) 38 Unit(s) SubCutaneous three times a day before meals  ipratropium    for Nebulization 500 MICROGram(s) Nebulizer every 6 hours  lactulose Syrup 20 Gram(s) Oral daily  levalbuterol Inhalation 0.63 milliGRAM(s) Inhalation every 6 hours  metoprolol tartrate 150 milliGRAM(s) Oral two times a day  nystatin Powder 1 Application(s) Topical two times a day  oxyCODONE    IR 30 milliGRAM(s) Oral two times a day  polyethylene glycol 3350 17 Gram(s) Oral daily  rivaroxaban 15 milliGRAM(s) Oral daily  senna 2 Tablet(s) Oral at bedtime  torsemide 20 milliGRAM(s) Oral daily                            13.5   15.98 )-----------( 179      ( 01 Jan 2024 06:25 )             42.5     01-01    137  |  94<L>  |  74.1<H>  ----------------------------<  124<H>  4.7   |  26.0  |  1.59<H>    Ca    9.3      01 Jan 2024 06:25      RECENT CULTURES:  12-30 @ 16:30    [facility]    12-26 @ 09:31 .Blood Blood     No growth at 5 days    12-26 @ 09:21 .Blood Blood     No growth at 5 days      WBC Count: 15.98 K/uL (01-01-24 @ 06:25)  WBC Count: 18.80 K/uL (12-31-23 @ 05:51)  WBC Count: 20.82 K/uL (12-30-23 @ 06:11)  WBC Count: 27.52 K/uL (12-29-23 @ 05:52)  WBC Count: 31.44 K/uL (12-28-23 @ 04:37)    Creatinine: 1.59 mg/dL (01-01-24 @ 06:25)  Creatinine: 1.72 mg/dL (12-31-23 @ 21:50)  Creatinine: 1.64 mg/dL (12-30-23 @ 06:11)  Creatinine: 1.60 mg/dL (12-29-23 @ 05:52)  Creatinine: 1.57 mg/dL (12-28-23 @ 12:38)  Creatinine: 1.66 mg/dL (12-28-23 @ 04:37)    Procalcitonin, Serum: 0.11 ng/mL (12-22-23 @ 04:51)     SARS-CoV-2: NotDetec (12-30-23 @ 16:30)  SARS-CoV-2: [facility] (12-16-23 @ 10:18)        < from: Xray Chest 1 View- PORTABLE-Urgent (Xray Chest 1 View- PORTABLE-Urgent .) (12.30.23 @ 16:23) >  ACC: [id number] EXAM:  XR CHEST PORTABLE URGENT 1V   ORDERED BY: DANIS BOOKER     PROCEDURE DATE:  12/30/2023      INTERPRETATION:  CLINICAL STATEMENT: Shortness of breath, heart failure    TECHNIQUE: AP view of the chest.      COMPARISON: 12/26/2023    Limited by patient positioning/rotation.    Heart size may be exaggerated by AP technique and patient body habitus.   Elevation right hemidiaphragm. Question mild pulmonary vascular   congestion. Otherwise, no focal lung consolidation, sizable effusion or   pneumothorax.    No significant osseous abnormality.    IMPRESSION:    Limitation as above.    Question mild pulmonary vascular congestion. No focal lung consolidation,   sizable effusion or pneumothorax.    --- End of Report ---    < end of copied text >                                                [facility]                                                INFECTIOUS DISEASES  =======================================================                   aCl George#   Martin Huber MD#    Esther Ceballos MD*                           Noreen Marin MD*   Sheba Miller MD*           Diplomates American Board of Internal Medicine & Infectious Diseases                  # [place] Office - Appt - Tel  [phone] Fax [phone]                * [place] Office - Appt - Tel [phone] Fax [phone]                                  Hospital Consult line:  [phone]  =======================================================      [id number]  AGUEDA LUIS   follow up for: pneumonia, leukocytosis  no fever  pt feels ok- anxious about procedure  + cough, unable to bring up sputum  dyspnea better  no chest pain  no diarrhea  patient seen and examined.         REVIEW OF SYSTEMS:  CONSTITUTIONAL:  No Fever or chills  HEENT:  No diplopia or blurred vision.  No earache, sore throat or runny nose.  CARDIOVASCULAR:  No pressure, squeezing, strangling, tightness, heaviness or aching about the chest, neck, axilla or epigastrium.  RESPIRATORY:  + cough, shortness of breath  GASTROINTESTINAL:  No nausea, vomiting or diarrhea.  GENITOURINARY:  No dysuria, frequency or urgency. No Blood in urine  MUSCULOSKELETAL:  no joint aches, no muscle pain  SKIN:  No change in skin, hair or nails.  NEUROLOGIC:  No Headaches      Allergies  wool- rash, itch (Other)  adhesives (Rash)  latex (Rash)  Bactrim (Flushing)      Physical Exam:  General:  No acute distress. morbidly obese laying in bed, on o2  Eye: no conjunctival pallor, no scleral icterus  HENT: Normocephalic, No pharyngeal erythema, No sinus tenderness.  Neck: Supple, No lymphadenopathy.  Respiratory: coarse BS rt>left to auscultation, Respirations are non-labored.  Cardiovascular: Normal rate, Regular rhythm,  s1+s2  Gastrointestinal: Soft, Non-tender, Non-distended, Normal bowel sounds  Integumentary: b/l chronic venous stasis dermatitis changes, b/l pitting edema, dry skin      Vitals:  T(F): 97.7 (01 Jan 2024 09:22), Max: 97.7 (01 Jan 2024 04:24)  HR: 140 (01 Jan 2024 13:25)  BP: 103/60 (01 Jan 2024 13:25)  RR: 18 (01 Jan 2024 13:25)  SpO2: 99% (01 Jan 2024 13:25) (94% - 100%)  temp max in last 48H T(F): , Max: 98.5 (12-30-23 @ 14:20)      Current Antibiotics:    Other medications:  acetylcysteine 20%  Inhalation 4 milliLiter(s) Inhalation daily  atorvastatin 80 milliGRAM(s) Oral at bedtime  budesonide  80 MICROgram(s)/formoterol 4.5 MICROgram(s) Inhaler 2 Puff(s) Inhalation two times a day  chlorhexidine 2% Cloths 1 Application(s) Topical <User Schedule>  clotrimazole 1% Cream 1 Application(s) Topical two times a day  dextrose 5%. 1000 milliLiter(s) IV Continuous <Continuous>  dextrose 5%. 1000 milliLiter(s) IV Continuous <Continuous>  dextrose 50% Injectable 12.5 Gram(s) IV Push once  dextrose 50% Injectable 25 Gram(s) IV Push once  dextrose 50% Injectable 25 Gram(s) IV Push once  famotidine    Tablet 20 milliGRAM(s) Oral daily  glucagon  Injectable 1 milliGRAM(s) IntraMuscular once  insulin glargine Injectable (LANTUS) 40 Unit(s) SubCutaneous at bedtime  insulin glargine Injectable (LANTUS) 40 Unit(s) SubCutaneous every morning  insulin lispro (ADMELOG) corrective regimen sliding scale   SubCutaneous three times a day before meals  insulin lispro Injectable (ADMELOG) 38 Unit(s) SubCutaneous three times a day before meals  ipratropium    for Nebulization 500 MICROGram(s) Nebulizer every 6 hours  lactulose Syrup 20 Gram(s) Oral daily  levalbuterol Inhalation 0.63 milliGRAM(s) Inhalation every 6 hours  metoprolol tartrate 150 milliGRAM(s) Oral two times a day  nystatin Powder 1 Application(s) Topical two times a day  oxyCODONE    IR 30 milliGRAM(s) Oral two times a day  polyethylene glycol 3350 17 Gram(s) Oral daily  rivaroxaban 15 milliGRAM(s) Oral daily  senna 2 Tablet(s) Oral at bedtime  torsemide 20 milliGRAM(s) Oral daily                            13.5   15.98 )-----------( 179      ( 01 Jan 2024 06:25 )             42.5     01-01    137  |  94<L>  |  74.1<H>  ----------------------------<  124<H>  4.7   |  26.0  |  1.59<H>    Ca    9.3      01 Jan 2024 06:25      RECENT CULTURES:  12-30 @ 16:30    [facility]    12-26 @ 09:31 .Blood Blood     No growth at 5 days    12-26 @ 09:21 .Blood Blood     No growth at 5 days      WBC Count: 15.98 K/uL (01-01-24 @ 06:25)  WBC Count: 18.80 K/uL (12-31-23 @ 05:51)  WBC Count: 20.82 K/uL (12-30-23 @ 06:11)  WBC Count: 27.52 K/uL (12-29-23 @ 05:52)  WBC Count: 31.44 K/uL (12-28-23 @ 04:37)    Creatinine: 1.59 mg/dL (01-01-24 @ 06:25)  Creatinine: 1.72 mg/dL (12-31-23 @ 21:50)  Creatinine: 1.64 mg/dL (12-30-23 @ 06:11)  Creatinine: 1.60 mg/dL (12-29-23 @ 05:52)  Creatinine: 1.57 mg/dL (12-28-23 @ 12:38)  Creatinine: 1.66 mg/dL (12-28-23 @ 04:37)    Procalcitonin, Serum: 0.11 ng/mL (12-22-23 @ 04:51)     SARS-CoV-2: NotDetec (12-30-23 @ 16:30)  SARS-CoV-2: [facility] (12-16-23 @ 10:18)        < from: Xray Chest 1 View- PORTABLE-Urgent (Xray Chest 1 View- PORTABLE-Urgent .) (12.30.23 @ 16:23) >  ACC: [id number] EXAM:  XR CHEST PORTABLE URGENT 1V   ORDERED BY: DANIS BOOKER     PROCEDURE DATE:  12/30/2023      INTERPRETATION:  CLINICAL STATEMENT: Shortness of breath, heart failure    TECHNIQUE: AP view of the chest.      COMPARISON: 12/26/2023    Limited by patient positioning/rotation.    Heart size may be exaggerated by AP technique and patient body habitus.   Elevation right hemidiaphragm. Question mild pulmonary vascular   congestion. Otherwise, no focal lung consolidation, sizable effusion or   pneumothorax.    No significant osseous abnormality.    IMPRESSION:    Limitation as above.    Question mild pulmonary vascular congestion. No focal lung consolidation,   sizable effusion or pneumothorax.    --- End of Report ---    < end of copied text >

## 2024-01-01 NOTE — PROGRESS NOTE ADULT - SUBJECTIVE AND OBJECTIVE BOX
NEPHROLOGY INTERVAL HPI/OVERNIGHT EVENTS:    No new events.    MEDICATIONS  (STANDING):  acetylcysteine 20%  Inhalation 4 milliLiter(s) Inhalation daily  atorvastatin 80 milliGRAM(s) Oral at bedtime  budesonide  80 MICROgram(s)/formoterol 4.5 MICROgram(s) Inhaler 2 Puff(s) Inhalation two times a day  chlorhexidine 2% Cloths 1 Application(s) Topical <User Schedule>  clotrimazole 1% Cream 1 Application(s) Topical two times a day  dextrose 5%. 1000 milliLiter(s) (100 mL/Hr) IV Continuous <Continuous>  dextrose 5%. 1000 milliLiter(s) (50 mL/Hr) IV Continuous <Continuous>  dextrose 50% Injectable 25 Gram(s) IV Push once  dextrose 50% Injectable 12.5 Gram(s) IV Push once  dextrose 50% Injectable 25 Gram(s) IV Push once  famotidine    Tablet 20 milliGRAM(s) Oral daily  glucagon  Injectable 1 milliGRAM(s) IntraMuscular once  insulin glargine Injectable (LANTUS) 40 Unit(s) SubCutaneous at bedtime  insulin glargine Injectable (LANTUS) 40 Unit(s) SubCutaneous every morning  insulin lispro (ADMELOG) corrective regimen sliding scale   SubCutaneous three times a day before meals  insulin lispro Injectable (ADMELOG) 38 Unit(s) SubCutaneous three times a day before meals  ipratropium    for Nebulization 500 MICROGram(s) Nebulizer every 6 hours  lactulose Syrup 20 Gram(s) Oral daily  levalbuterol Inhalation 0.63 milliGRAM(s) Inhalation every 6 hours  metoprolol tartrate 150 milliGRAM(s) Oral two times a day  nystatin Powder 1 Application(s) Topical two times a day  polyethylene glycol 3350 17 Gram(s) Oral daily  rivaroxaban 15 milliGRAM(s) Oral daily  senna 2 Tablet(s) Oral at bedtime  torsemide 20 milliGRAM(s) Oral daily    MEDICATIONS  (PRN):  acetaminophen     Tablet .. 650 milliGRAM(s) Oral every 6 hours PRN Temp greater or equal to 38C (100.4F), Mild Pain (1 - 3)  ALPRAZolam 0.25 milliGRAM(s) Oral every 8 hours PRN for anxiety/Sleep  dextrose Oral Gel 15 Gram(s) Oral once PRN Blood Glucose LESS THAN 70 milliGRAM(s)/deciliter  diphenhydrAMINE 25 milliGRAM(s) Oral every 6 hours PRN Rash and/or Itching  hydrALAZINE Injectable 10 milliGRAM(s) IV Push every 4 hours PRN for systolic bp > 160  melatonin 3 milliGRAM(s) Oral at bedtime PRN Insomnia  metoprolol tartrate Injectable 5 milliGRAM(s) IV Push every 6 hours PRN heart rate sustaining greater than 130  ondansetron Injectable 4 milliGRAM(s) IV Push every 8 hours PRN Nausea and/or Vomiting  oxyCODONE    IR 5 milliGRAM(s) Oral every 6 hours PRN Moderate Pain (4 - 6)      Allergies    wool- rash, itch (Other)  adhesives (Rash)  latex (Rash)  Bactrim (Flushing)        Vital Signs Last 24 Hrs  T(C): 36.5 (01 Jan 2024 09:22), Max: 36.5 (01 Jan 2024 04:24)  T(F): 97.7 (01 Jan 2024 09:22), Max: 97.7 (01 Jan 2024 04:24)  HR: 109 (01 Jan 2024 15:11) (65 - 140)  BP: 111/70 (01 Jan 2024 15:11) (96/66 - 125/56)  BP(mean): 79 (31 Dec 2023 16:30) (79 - 79)  RR: 18 (01 Jan 2024 15:11) (18 - 20)  SpO2: 94% (01 Jan 2024 15:11) (94% - 100%)    Parameters below as of 01 Jan 2024 15:11  Patient On (Oxygen Delivery Method): nasal cannula  O2 Flow (L/min): 4    T(C): 36.7 (31 Dec 2023 12:00), Max: 36.8 (31 Dec 2023 04:55)  T(F): 98.1 (31 Dec 2023 12:00), Max: 98.3 (31 Dec 2023 04:55)  HR: 99 (31 Dec 2023 15:34) (74 - 105)  BP: 134/74 (31 Dec 2023 15:34) (110/70 - 134/74)  BP(mean): --  RR: 18 (31 Dec 2023 15:34) (17 - 19)  SpO2: 96% (31 Dec 2023 15:34) (96% - 99%)    Parameters below as of 31 Dec 2023 15:34  Patient On (Oxygen Delivery Method): nasal cannula  O2 Flow (L/min): 4    PHYSICAL EXAM:                Comfortable in bed on nasal 02  	HEENT: wnl  	Pulm: decreased bs  	CV: RRR , ; no rub  	Back: No spinal or CVA tenderness;  	Abd: +BS, soft, nontender/nondistended, body  wall edema  	: Doris noted  	LE: Leg with  pitting edema       LABS:    01-01    137  |  94<L>  |  74.1<H>  ----------------------------<  124<H>  4.7   |  26.0  |  1.59<H>    Ca    9.3      01 Jan 2024 06:25                            13.8   18.80 )-----------( 142      ( 31 Dec 2023 05:51 )             41.9     12-30    138  |  96  |  80.0<H>  ----------------------------<  259<H>  4.6   |  28.0  |  1.64<H>    Ca    9.3      30 Dec 2023 06:11      PTT - ( 30 Dec 2023 06:11 )  PTT:26.5 sec  Urinalysis Basic - ( 30 Dec 2023 06:11 )    Color: x / Appearance: x / SG: x / pH: x  Gluc: 259 mg/dL / Ketone: x  / Bili: x / Urobili: x   Blood: x / Protein: x / Nitrite: x   Leuk Esterase: x / RBC: x / WBC x   Sq Epi: x / Non Sq Epi: x / Bacteria: x              RADIOLOGY & ADDITIONAL TESTS:

## 2024-01-01 NOTE — CONSULT NOTE ADULT - ATTENDING COMMENTS
Patient with bilateral palpable pedal pulses no concerns for arterial insufficiency. She does have venous stasis changes to bilateral lower extremities. Recommend compression stockings. Follow up in vascular clinic in 6 months

## 2024-01-02 LAB
ANION GAP SERPL CALC-SCNC: 15 MMOL/L — SIGNIFICANT CHANGE UP (ref 5–17)
ANION GAP SERPL CALC-SCNC: 15 MMOL/L — SIGNIFICANT CHANGE UP (ref 5–17)
BUN SERPL-MCNC: 74.6 MG/DL — HIGH (ref 8–20)
BUN SERPL-MCNC: 74.6 MG/DL — HIGH (ref 8–20)
CALCIUM SERPL-MCNC: 9.3 MG/DL — SIGNIFICANT CHANGE UP (ref 8.4–10.5)
CALCIUM SERPL-MCNC: 9.3 MG/DL — SIGNIFICANT CHANGE UP (ref 8.4–10.5)
CHLORIDE SERPL-SCNC: 93 MMOL/L — LOW (ref 96–108)
CHLORIDE SERPL-SCNC: 93 MMOL/L — LOW (ref 96–108)
CO2 SERPL-SCNC: 22 MMOL/L — SIGNIFICANT CHANGE UP (ref 22–29)
CO2 SERPL-SCNC: 22 MMOL/L — SIGNIFICANT CHANGE UP (ref 22–29)
CREAT SERPL-MCNC: 1.85 MG/DL — HIGH (ref 0.5–1.3)
CREAT SERPL-MCNC: 1.85 MG/DL — HIGH (ref 0.5–1.3)
EGFR: 29 ML/MIN/1.73M2 — LOW
EGFR: 29 ML/MIN/1.73M2 — LOW
GLUCOSE BLDC GLUCOMTR-MCNC: 156 MG/DL — HIGH (ref 70–99)
GLUCOSE BLDC GLUCOMTR-MCNC: 156 MG/DL — HIGH (ref 70–99)
GLUCOSE BLDC GLUCOMTR-MCNC: 171 MG/DL — HIGH (ref 70–99)
GLUCOSE BLDC GLUCOMTR-MCNC: 171 MG/DL — HIGH (ref 70–99)
GLUCOSE BLDC GLUCOMTR-MCNC: 192 MG/DL — HIGH (ref 70–99)
GLUCOSE BLDC GLUCOMTR-MCNC: 192 MG/DL — HIGH (ref 70–99)
GLUCOSE BLDC GLUCOMTR-MCNC: 194 MG/DL — HIGH (ref 70–99)
GLUCOSE BLDC GLUCOMTR-MCNC: 194 MG/DL — HIGH (ref 70–99)
GLUCOSE BLDC GLUCOMTR-MCNC: 207 MG/DL — HIGH (ref 70–99)
GLUCOSE BLDC GLUCOMTR-MCNC: 207 MG/DL — HIGH (ref 70–99)
GLUCOSE BLDC GLUCOMTR-MCNC: 217 MG/DL — HIGH (ref 70–99)
GLUCOSE BLDC GLUCOMTR-MCNC: 217 MG/DL — HIGH (ref 70–99)
GLUCOSE SERPL-MCNC: 174 MG/DL — HIGH (ref 70–99)
GLUCOSE SERPL-MCNC: 174 MG/DL — HIGH (ref 70–99)
HCT VFR BLD CALC: 38.4 % — SIGNIFICANT CHANGE UP (ref 34.5–45)
HCT VFR BLD CALC: 38.4 % — SIGNIFICANT CHANGE UP (ref 34.5–45)
HCT VFR BLD CALC: 42.2 % — SIGNIFICANT CHANGE UP (ref 34.5–45)
HCT VFR BLD CALC: 42.2 % — SIGNIFICANT CHANGE UP (ref 34.5–45)
HGB BLD-MCNC: 12.9 G/DL — SIGNIFICANT CHANGE UP (ref 11.5–15.5)
HGB BLD-MCNC: 12.9 G/DL — SIGNIFICANT CHANGE UP (ref 11.5–15.5)
HGB BLD-MCNC: 13.2 G/DL — SIGNIFICANT CHANGE UP (ref 11.5–15.5)
HGB BLD-MCNC: 13.2 G/DL — SIGNIFICANT CHANGE UP (ref 11.5–15.5)
MCHC RBC-ENTMCNC: 29.4 PG — SIGNIFICANT CHANGE UP (ref 27–34)
MCHC RBC-ENTMCNC: 29.4 PG — SIGNIFICANT CHANGE UP (ref 27–34)
MCHC RBC-ENTMCNC: 30.6 GM/DL — LOW (ref 32–36)
MCHC RBC-ENTMCNC: 30.6 GM/DL — LOW (ref 32–36)
MCHC RBC-ENTMCNC: 31.2 PG — SIGNIFICANT CHANGE UP (ref 27–34)
MCHC RBC-ENTMCNC: 31.2 PG — SIGNIFICANT CHANGE UP (ref 27–34)
MCHC RBC-ENTMCNC: 34.4 GM/DL — SIGNIFICANT CHANGE UP (ref 32–36)
MCHC RBC-ENTMCNC: 34.4 GM/DL — SIGNIFICANT CHANGE UP (ref 32–36)
MCV RBC AUTO: 90.8 FL — SIGNIFICANT CHANGE UP (ref 80–100)
MCV RBC AUTO: 90.8 FL — SIGNIFICANT CHANGE UP (ref 80–100)
MCV RBC AUTO: 96.1 FL — SIGNIFICANT CHANGE UP (ref 80–100)
MCV RBC AUTO: 96.1 FL — SIGNIFICANT CHANGE UP (ref 80–100)
PLATELET # BLD AUTO: 128 K/UL — LOW (ref 150–400)
PLATELET # BLD AUTO: 128 K/UL — LOW (ref 150–400)
PLATELET # BLD AUTO: 142 K/UL — LOW (ref 150–400)
PLATELET # BLD AUTO: 142 K/UL — LOW (ref 150–400)
POTASSIUM SERPL-MCNC: 5.1 MMOL/L — SIGNIFICANT CHANGE UP (ref 3.5–5.3)
POTASSIUM SERPL-MCNC: 5.1 MMOL/L — SIGNIFICANT CHANGE UP (ref 3.5–5.3)
POTASSIUM SERPL-SCNC: 5.1 MMOL/L — SIGNIFICANT CHANGE UP (ref 3.5–5.3)
POTASSIUM SERPL-SCNC: 5.1 MMOL/L — SIGNIFICANT CHANGE UP (ref 3.5–5.3)
RBC # BLD: 4.23 M/UL — SIGNIFICANT CHANGE UP (ref 3.8–5.2)
RBC # BLD: 4.23 M/UL — SIGNIFICANT CHANGE UP (ref 3.8–5.2)
RBC # BLD: 4.39 M/UL — SIGNIFICANT CHANGE UP (ref 3.8–5.2)
RBC # BLD: 4.39 M/UL — SIGNIFICANT CHANGE UP (ref 3.8–5.2)
RBC # FLD: 14.2 % — SIGNIFICANT CHANGE UP (ref 10.3–14.5)
RBC # FLD: 14.2 % — SIGNIFICANT CHANGE UP (ref 10.3–14.5)
RBC # FLD: 14.3 % — SIGNIFICANT CHANGE UP (ref 10.3–14.5)
RBC # FLD: 14.3 % — SIGNIFICANT CHANGE UP (ref 10.3–14.5)
SODIUM SERPL-SCNC: 130 MMOL/L — LOW (ref 135–145)
SODIUM SERPL-SCNC: 130 MMOL/L — LOW (ref 135–145)
WBC # BLD: 14.64 K/UL — HIGH (ref 3.8–10.5)
WBC # BLD: 14.64 K/UL — HIGH (ref 3.8–10.5)
WBC # BLD: 14.92 K/UL — HIGH (ref 3.8–10.5)
WBC # BLD: 14.92 K/UL — HIGH (ref 3.8–10.5)
WBC # FLD AUTO: 14.64 K/UL — HIGH (ref 3.8–10.5)
WBC # FLD AUTO: 14.64 K/UL — HIGH (ref 3.8–10.5)
WBC # FLD AUTO: 14.92 K/UL — HIGH (ref 3.8–10.5)
WBC # FLD AUTO: 14.92 K/UL — HIGH (ref 3.8–10.5)

## 2024-01-02 PROCEDURE — 99232 SBSQ HOSP IP/OBS MODERATE 35: CPT

## 2024-01-02 PROCEDURE — 93321 DOPPLER ECHO F-UP/LMTD STD: CPT | Mod: 26

## 2024-01-02 PROCEDURE — 99233 SBSQ HOSP IP/OBS HIGH 50: CPT

## 2024-01-02 PROCEDURE — 93308 TTE F-UP OR LMTD: CPT | Mod: 26

## 2024-01-02 RX ORDER — METOPROLOL TARTRATE 50 MG
50 TABLET ORAL EVERY 8 HOURS
Refills: 0 | Status: DISCONTINUED | OUTPATIENT
Start: 2024-01-02 | End: 2024-01-03

## 2024-01-02 RX ORDER — OXYCODONE HYDROCHLORIDE 5 MG/1
20 TABLET ORAL EVERY 12 HOURS
Refills: 0 | Status: DISCONTINUED | OUTPATIENT
Start: 2024-01-02 | End: 2024-01-02

## 2024-01-02 RX ORDER — METOPROLOL TARTRATE 50 MG
25 TABLET ORAL ONCE
Refills: 0 | Status: COMPLETED | OUTPATIENT
Start: 2024-01-02 | End: 2024-01-02

## 2024-01-02 RX ORDER — SODIUM CHLORIDE 9 MG/ML
250 INJECTION INTRAMUSCULAR; INTRAVENOUS; SUBCUTANEOUS ONCE
Refills: 0 | Status: COMPLETED | OUTPATIENT
Start: 2024-01-02 | End: 2024-01-02

## 2024-01-02 RX ORDER — OXYCODONE HYDROCHLORIDE 5 MG/1
30 TABLET ORAL EVERY 12 HOURS
Refills: 0 | Status: DISCONTINUED | OUTPATIENT
Start: 2024-01-02 | End: 2024-01-08

## 2024-01-02 RX ORDER — METOPROLOL TARTRATE 50 MG
50 TABLET ORAL EVERY 6 HOURS
Refills: 0 | Status: DISCONTINUED | OUTPATIENT
Start: 2024-01-02 | End: 2024-01-02

## 2024-01-02 RX ADMIN — Medication 25 UNIT(S): at 12:31

## 2024-01-02 RX ADMIN — Medication 25 UNIT(S): at 18:43

## 2024-01-02 RX ADMIN — Medication 4: at 12:31

## 2024-01-02 RX ADMIN — Medication 20 MILLIGRAM(S): at 06:29

## 2024-01-02 RX ADMIN — Medication 25 UNIT(S): at 08:37

## 2024-01-02 RX ADMIN — LEVALBUTEROL 0.63 MILLIGRAM(S): 1.25 SOLUTION, CONCENTRATE RESPIRATORY (INHALATION) at 01:29

## 2024-01-02 RX ADMIN — Medication 50 MILLIGRAM(S): at 22:10

## 2024-01-02 RX ADMIN — SODIUM CHLORIDE 250 MILLILITER(S): 9 INJECTION INTRAMUSCULAR; INTRAVENOUS; SUBCUTANEOUS at 08:35

## 2024-01-02 RX ADMIN — Medication 5 MILLIGRAM(S): at 03:37

## 2024-01-02 RX ADMIN — LEVALBUTEROL 0.63 MILLIGRAM(S): 1.25 SOLUTION, CONCENTRATE RESPIRATORY (INHALATION) at 20:57

## 2024-01-02 RX ADMIN — INSULIN GLARGINE 30 UNIT(S): 100 INJECTION, SOLUTION SUBCUTANEOUS at 08:45

## 2024-01-02 RX ADMIN — Medication 500 MICROGRAM(S): at 09:24

## 2024-01-02 RX ADMIN — POLYETHYLENE GLYCOL 3350 17 GRAM(S): 17 POWDER, FOR SOLUTION ORAL at 12:27

## 2024-01-02 RX ADMIN — Medication 500 MICROGRAM(S): at 20:57

## 2024-01-02 RX ADMIN — INSULIN GLARGINE 30 UNIT(S): 100 INJECTION, SOLUTION SUBCUTANEOUS at 22:10

## 2024-01-02 RX ADMIN — Medication 2: at 18:42

## 2024-01-02 RX ADMIN — BUDESONIDE AND FORMOTEROL FUMARATE DIHYDRATE 2 PUFF(S): 160; 4.5 AEROSOL RESPIRATORY (INHALATION) at 09:11

## 2024-01-02 RX ADMIN — ATORVASTATIN CALCIUM 80 MILLIGRAM(S): 80 TABLET, FILM COATED ORAL at 21:11

## 2024-01-02 RX ADMIN — Medication 500 MICROGRAM(S): at 01:29

## 2024-01-02 RX ADMIN — OXYCODONE HYDROCHLORIDE 30 MILLIGRAM(S): 5 TABLET ORAL at 21:09

## 2024-01-02 RX ADMIN — LEVALBUTEROL 0.63 MILLIGRAM(S): 1.25 SOLUTION, CONCENTRATE RESPIRATORY (INHALATION) at 15:22

## 2024-01-02 RX ADMIN — RIVAROXABAN 15 MILLIGRAM(S): KIT at 14:56

## 2024-01-02 RX ADMIN — OXYCODONE HYDROCHLORIDE 30 MILLIGRAM(S): 5 TABLET ORAL at 09:21

## 2024-01-02 RX ADMIN — Medication 1 APPLICATION(S): at 06:30

## 2024-01-02 RX ADMIN — LEVALBUTEROL 0.63 MILLIGRAM(S): 1.25 SOLUTION, CONCENTRATE RESPIRATORY (INHALATION) at 09:11

## 2024-01-02 RX ADMIN — Medication 500 MICROGRAM(S): at 15:22

## 2024-01-02 RX ADMIN — OXYCODONE HYDROCHLORIDE 30 MILLIGRAM(S): 5 TABLET ORAL at 22:20

## 2024-01-02 RX ADMIN — LACTULOSE 20 GRAM(S): 10 SOLUTION ORAL at 12:26

## 2024-01-02 RX ADMIN — BUDESONIDE AND FORMOTEROL FUMARATE DIHYDRATE 2 PUFF(S): 160; 4.5 AEROSOL RESPIRATORY (INHALATION) at 20:56

## 2024-01-02 RX ADMIN — Medication 2: at 08:36

## 2024-01-02 RX ADMIN — Medication 25 MILLIGRAM(S): at 08:27

## 2024-01-02 RX ADMIN — NYSTATIN CREAM 1 APPLICATION(S): 100000 CREAM TOPICAL at 06:30

## 2024-01-02 RX ADMIN — SENNA PLUS 2 TABLET(S): 8.6 TABLET ORAL at 21:09

## 2024-01-02 RX ADMIN — FAMOTIDINE 20 MILLIGRAM(S): 10 INJECTION INTRAVENOUS at 12:32

## 2024-01-02 RX ADMIN — Medication 50 MILLIGRAM(S): at 14:57

## 2024-01-02 NOTE — CHART NOTE - NSCHARTNOTEFT_GEN_A_CORE
Source: Patient [x ]  Family [ ]   other [ ]    Current Diet: Diet, Consistent Carbohydrate w/Evening Snack:   1500mL Fluid Restriction ([id number])  Low Sodium (12-19-23 @ 18:24)    PO intake:  < 50% [ ]   50-75%  [ ]   %  [ x]  other :    Source for PO intake [ x] Patient [ ] family [ ] chart [ x] staff [ ] other    Current Weight:   12/20- 389.8 pounds  Edema 1+ right arm  Edema 3+ right ankle, left ankle    Pertinent Medications: MEDICATIONS  (STANDING):  acetylcysteine 20%  Inhalation 4 milliLiter(s) Inhalation daily  atorvastatin 80 milliGRAM(s) Oral at bedtime  glucagon  Injectable 1 milliGRAM(s) IntraMuscular once  insulin glargine Injectable (LANTUS) 30 Unit(s) SubCutaneous every morning  insulin glargine Injectable (LANTUS) 30 Unit(s) SubCutaneous at bedtime  insulin lispro (ADMELOG) corrective regimen sliding scale   SubCutaneous three times a day before meals  insulin lispro Injectable (ADMELOG) 25 Unit(s) SubCutaneous three times a day before meals  ipratropium    for Nebulization 500 MICROGram(s) Nebulizer every 6 hours  lactulose Syrup 20 Gram(s) Oral daily  levalbuterol Inhalation 0.63 milliGRAM(s) Inhalation every 6 hours  metoprolol tartrate 50 milliGRAM(s) Oral every 8 hours  polyethylene glycol 3350 17 Gram(s) Oral daily  senna 2 Tablet(s) Oral at bedtime  torsemide 20 milliGRAM(s) Oral daily    MEDICATIONS  (PRN):  ondansetron Injectable 4 milliGRAM(s) IV Push every 8 hours PRN Nausea and/or Vomiting    Pertinent Labs: CBC Full  -  ( 02 Jan 2024 05:35 )  WBC Count : 14.92 K/uL  RBC Count : 4.23 M/uL  Hemoglobin : 13.2 g/dL  Hematocrit : 38.4 %  Platelet Count - Automated : 142 K/uL  Mean Cell Volume : 90.8 fl  Mean Cell Hemoglobin : 31.2 pg  Mean Cell Hemoglobin Concentration : 34.4 gm/dL    01-02 Na130 mmol/L<L> Glu 174 mg/dL<H> K+ 5.1 mmol/L Cr  1.85 mg/dL<H> BUN 74.6 mg/dL<H> Phos n/a   Alb n/a   PAB n/a       Skin: Bruised  IAD/ MAD in all folds, LE Evert     Estimated Needs:   [X ] no change since previous assessment  [ ] recalculated:     Hospital Course: 69 yo female with HTN, HLD, DM2, HFpEF, CKD III, DVT, Uterine CA, COPD on home o2 who presented with complaints of shortness of breath. Patient reports that she has been sick all week and was recently started on Vantin and steroids while at the nursing home. Patient reports that her dyspnea just worsened and she told the nursing home to send her in for an evaluation. While in the ED, patient was placed on BIPAP and was given IV lasix with some improvement. RVP then results as RSV +. Patient had a CT scan which also showed pneumonia and pulmonary HTN. Admission to medicine was requested for further management.    Current Nutrition Diagnosis: Pt remains at risk due to newly identified malnutrition. Malnutrition (acute, severe) related to inability to meet sufficient protein-energy needs in setting of CHF , COPD, and frequent hospitalizations as evidenced by weight loss <50%EER for >5days,  >7.5% x3 months, and edema +2,+3. Pt reports variable PO intake <% at meals with overall increase in appetite. Pt endorses not liking hospital food and RD noted lunch was takeout. Discussed the importance of low sodium diet and reviewed ways to add flavor without the addition of salt. Last BM reported 1/1. RD to remain available.          Recommendations:   1) Rx: MVI daily, OsCal supplementation   2) Monitor electrolytes and replete prn   3)Encourage po intake, monitor diet tolerance, and provide assistance at meals as needed.     Monitoring and Evaluation:   [x ] PO intake [x ] Tolerance to diet prescription [X] Weights  [X] Follow up per protocol [X] Labs: chem 8, mg, phos, H/H, BGM Source: Patient [x ]  Family [ ]   other [ ]    Current Diet: Diet, Consistent Carbohydrate w/Evening Snack:   1500mL Fluid Restriction ([id number])  Low Sodium (12-19-23 @ 18:24)    PO intake:  < 50% [ ]   50-75%  [ ]   %  [ x]  other :    Source for PO intake [ x] Patient [ ] family [ ] chart [ x] staff [ ] other    Current Weight:   12/20- 389.8 pounds  Edema 1+ right arm  Edema 3+ right ankle, left ankle    Pertinent Medications: MEDICATIONS  (STANDING):  acetylcysteine 20%  Inhalation 4 milliLiter(s) Inhalation daily  atorvastatin 80 milliGRAM(s) Oral at bedtime  glucagon  Injectable 1 milliGRAM(s) IntraMuscular once  insulin glargine Injectable (LANTUS) 30 Unit(s) SubCutaneous every morning  insulin glargine Injectable (LANTUS) 30 Unit(s) SubCutaneous at bedtime  insulin lispro (ADMELOG) corrective regimen sliding scale   SubCutaneous three times a day before meals  insulin lispro Injectable (ADMELOG) 25 Unit(s) SubCutaneous three times a day before meals  ipratropium    for Nebulization 500 MICROGram(s) Nebulizer every 6 hours  lactulose Syrup 20 Gram(s) Oral daily  levalbuterol Inhalation 0.63 milliGRAM(s) Inhalation every 6 hours  metoprolol tartrate 50 milliGRAM(s) Oral every 8 hours  polyethylene glycol 3350 17 Gram(s) Oral daily  senna 2 Tablet(s) Oral at bedtime  torsemide 20 milliGRAM(s) Oral daily    MEDICATIONS  (PRN):  ondansetron Injectable 4 milliGRAM(s) IV Push every 8 hours PRN Nausea and/or Vomiting    Pertinent Labs: CBC Full  -  ( 02 Jan 2024 05:35 )  WBC Count : 14.92 K/uL  RBC Count : 4.23 M/uL  Hemoglobin : 13.2 g/dL  Hematocrit : 38.4 %  Platelet Count - Automated : 142 K/uL  Mean Cell Volume : 90.8 fl  Mean Cell Hemoglobin : 31.2 pg  Mean Cell Hemoglobin Concentration : 34.4 gm/dL    01-02 Na130 mmol/L<L> Glu 174 mg/dL<H> K+ 5.1 mmol/L Cr  1.85 mg/dL<H> BUN 74.6 mg/dL<H> Phos n/a   Alb n/a   PAB n/a       Skin: Bruised  IAD/ MAD in all folds, LE Evert     Estimated Needs:   [X ] no change since previous assessment  [ ] recalculated:     Hospital Course: 67 yo female with HTN, HLD, DM2, HFpEF, CKD III, DVT, Uterine CA, COPD on home o2 who presented with complaints of shortness of breath. Patient reports that she has been sick all week and was recently started on Vantin and steroids while at the nursing home. Patient reports that her dyspnea just worsened and she told the nursing home to send her in for an evaluation. While in the ED, patient was placed on BIPAP and was given IV lasix with some improvement. RVP then results as RSV +. Patient had a CT scan which also showed pneumonia and pulmonary HTN. Admission to medicine was requested for further management.    Current Nutrition Diagnosis: Pt remains at risk due to newly identified malnutrition. Malnutrition (acute, severe) related to inability to meet sufficient protein-energy needs in setting of CHF , COPD, and frequent hospitalizations as evidenced by weight loss <50%EER for >5days,  >7.5% x3 months, and edema +2,+3. Pt reports variable PO intake <% at meals with overall increase in appetite. Pt endorses not liking hospital food and RD noted lunch was takeout. Discussed the importance of low sodium diet and reviewed ways to add flavor without the addition of salt. Last BM reported 1/1. RD to remain available.          Recommendations:   1) Rx: MVI daily, OsCal supplementation   2) Monitor electrolytes and replete prn   3)Encourage po intake, monitor diet tolerance, and provide assistance at meals as needed.     Monitoring and Evaluation:   [x ] PO intake [x ] Tolerance to diet prescription [X] Weights  [X] Follow up per protocol [X] Labs: chem 8, mg, phos, H/H, BGM

## 2024-01-02 NOTE — PROGRESS NOTE ADULT - ASSESSMENT
67 yo female with HTN, HLD, DM2, HFpEF, CKD III, DVT, Uterine CA, COPD on home o2 who presented with complaints of shortness of breath. Admitted with acute on chronic resp failure and acute HFpEF exacerbation with likely with Superimposed bacterial Pneumonia in setting of RSV and new onset AFib and worsening GFR. S/p [facility] with mild LAD disease, Severe pulmonary HTN, LVEDP stable    1. T2DM complicated by CKD  - Hypoglycemic yesterday, admelog was decreased from 38 to 25 units TID  - Continue premeal admelog 25 units TID with meals  - Continue lantus 30 units BID  - Sliding scale coverage    2. HLD  - Continue statin    3. Right leg pain  - US obtained without evidence of stenosis/thrombus  - No intervention as per vascular     67 yo female, morbidly obese, with HTN, HLD, DM2, HFpEF, CKD III, DVT, Uterine CA, COPD on home o2 who presented with complaints of shortness of breath. Admitted with acute on chronic resp failure and acute HFpEF exacerbation with likely with Superimposed bacterial Pneumonia in setting of RSV and new onset AFib and worsening GFR. S/p [facility] with mild LAD disease, Severe pulmonary HTN, LVEDP stable    1. T2DM complicated by CKD  - Hypoglycemic yesterday, admelog was decreased from 38 to 25 units TID  - Continue premeal admelog 25 units TID with meals  - Continue lantus 30 units BID  - Sliding scale coverage    2. HLD  - Continue statin    3. Right leg pain  - US obtained without evidence of stenosis/thrombus  - No intervention as per vascular     67 yo female, morbidly obese, with HTN, HLD, DM2, HFpEF, CKD III, DVT, Uterine CA, COPD on home o2 who presented with complaints of shortness of breath. Admitted with acute on chronic resp failure and acute HFpEF exacerbation with likely with Superimposed bacterial Pneumonia in setting of RSV and new onset AFib and worsening GFR. S/p [facility] with mild LAD disease, Severe pulmonary HTN, LVEDP stable    1. T2DM complicated by CKD  - Hypoglycemic yesterday, admelog was decreased from 38 to 25 units TID  - Continue premeal admelog 25 units TID with meals  - Continue lantus 30 units BID  - Sliding scale coverage    2. HLD  - Continue statin    3. Right leg pain  - US obtained without evidence of stenosis/thrombus  - No intervention as per vascular

## 2024-01-02 NOTE — CHART NOTE - NSCHARTNOTEFT_GEN_A_CORE
No intervention required from vascular surgery standpoint. Patient has palpable pulses b/l.     Plan:  -can follow up in six months PRN with Dr. Hopper for venous stasis.  -Recommend b/l compression stalking' s

## 2024-01-02 NOTE — PROGRESS NOTE ADULT - SUBJECTIVE AND OBJECTIVE BOX
INTERVAL EVENTS:  Follow up diabetes management    ROS: Complains of right knee pain, feeling anxious    MEDICATIONS  (STANDING):  acetylcysteine 20%  Inhalation 4 milliLiter(s) Inhalation daily  atorvastatin 80 milliGRAM(s) Oral at bedtime  budesonide  80 MICROgram(s)/formoterol 4.5 MICROgram(s) Inhaler 2 Puff(s) Inhalation two times a day  chlorhexidine 2% Cloths 1 Application(s) Topical <User Schedule>  clotrimazole 1% Cream 1 Application(s) Topical two times a day  dextrose 5%. 1000 milliLiter(s) (100 mL/Hr) IV Continuous <Continuous>  dextrose 5%. 1000 milliLiter(s) (50 mL/Hr) IV Continuous <Continuous>  dextrose 50% Injectable 25 Gram(s) IV Push once  dextrose 50% Injectable 12.5 Gram(s) IV Push once  dextrose 50% Injectable 25 Gram(s) IV Push once  famotidine    Tablet 20 milliGRAM(s) Oral daily  glucagon  Injectable 1 milliGRAM(s) IntraMuscular once  insulin glargine Injectable (LANTUS) 30 Unit(s) SubCutaneous every morning  insulin glargine Injectable (LANTUS) 30 Unit(s) SubCutaneous at bedtime  insulin lispro (ADMELOG) corrective regimen sliding scale   SubCutaneous three times a day before meals  insulin lispro Injectable (ADMELOG) 25 Unit(s) SubCutaneous three times a day before meals  ipratropium    for Nebulization 500 MICROGram(s) Nebulizer every 6 hours  lactulose Syrup 20 Gram(s) Oral daily  levalbuterol Inhalation 0.63 milliGRAM(s) Inhalation every 6 hours  metoprolol tartrate 50 milliGRAM(s) Oral every 8 hours  nystatin Powder 1 Application(s) Topical two times a day  oxyCODONE  ER Tablet 30 milliGRAM(s) Oral every 12 hours  polyethylene glycol 3350 17 Gram(s) Oral daily  rivaroxaban 15 milliGRAM(s) Oral daily  senna 2 Tablet(s) Oral at bedtime  torsemide 20 milliGRAM(s) Oral daily    MEDICATIONS  (PRN):  acetaminophen     Tablet .. 650 milliGRAM(s) Oral every 6 hours PRN Temp greater or equal to 38C (100.4F), Mild Pain (1 - 3)  ALPRAZolam 0.25 milliGRAM(s) Oral every 8 hours PRN for anxiety/Sleep  aluminum hydroxide/magnesium hydroxide/simethicone Suspension 30 milliLiter(s) Oral once PRN Dyspepsia  dextrose Oral Gel 15 Gram(s) Oral once PRN Blood Glucose LESS THAN 70 milliGRAM(s)/deciliter  diphenhydrAMINE 25 milliGRAM(s) Oral every 6 hours PRN Rash and/or Itching  hydrALAZINE Injectable 10 milliGRAM(s) IV Push every 4 hours PRN for systolic bp > 160  melatonin 3 milliGRAM(s) Oral at bedtime PRN Insomnia  metoprolol tartrate Injectable 5 milliGRAM(s) IV Push every 6 hours PRN heart rate sustaining greater than 130  ondansetron Injectable 4 milliGRAM(s) IV Push every 8 hours PRN Nausea and/or Vomiting  oxyCODONE    IR 5 milliGRAM(s) Oral every 6 hours PRN Moderate Pain (4 - 6)    Allergies  wool- rash, itch (Other)  adhesives (Rash)  latex (Rash)  Bactrim (Flushing)    Vital Signs Last 24 Hrs  T(C): 36.7 (02 Jan 2024 08:10), Max: 37.1 (02 Jan 2024 01:29)  T(F): 98 (02 Jan 2024 08:10), Max: 98.7 (02 Jan 2024 01:29)  HR: 112 (02 Jan 2024 12:23) (106 - 140)  BP: 96/57 (02 Jan 2024 12:23) (93/53 - 111/70)  BP(mean): --  RR: 21 (02 Jan 2024 08:10) (18 - 21)  SpO2: 100% (02 Jan 2024 08:10) (94% - 100%)    Parameters below as of 02 Jan 2024 08:10  Patient On (Oxygen Delivery Method): nasal cannula    PHYSICAL EXAM:  General: No apparent distress, obese  Neck: Supple, trachea midline, no thyromegaly  Respiratory: Lungs clear bilaterally, normal rate, effort  Cardiac: +S1, S2, no m/r/g  GI: +BS, soft, non tender, non distended  Extremities: Bilateral LE chronic skin changes  Neuro: A+O X3, no tremor    LABS:                        13.2   14.92 )-----------( 142      ( 02 Jan 2024 05:35 )             38.4     01-02    130<L>  |  93<L>  |  74.6<H>  ----------------------------<  174<H>  5.1   |  22.0  |  1.85<H>    Ca    9.3      02 Jan 2024 00:05      Urinalysis Basic - ( 02 Jan 2024 00:05 )    Color: x / Appearance: x / SG: x / pH: x  Gluc: 174 mg/dL / Ketone: x  / Bili: x / Urobili: x   Blood: x / Protein: x / Nitrite: x   Leuk Esterase: x / RBC: x / WBC x   Sq Epi: x / Non Sq Epi: x / Bacteria: x    POCT Blood Glucose.: 217 mg/dL (01-02-24 @ 12:00)  POCT Blood Glucose.: 192 mg/dL (01-02-24 @ 08:04)  POCT Blood Glucose.: 194 mg/dL (01-02-24 @ 05:25)  POCT Blood Glucose.: 164 mg/dL (01-01-24 @ 22:52)  POCT Blood Glucose.: 138 mg/dL (01-01-24 @ 21:29)  POCT Blood Glucose.: 83 mg/dL (01-01-24 @ 18:34)  POCT Blood Glucose.: 88 mg/dL (01-01-24 @ 17:05)        Thyroid Stimulating Hormone, Serum: 1.42 uIU/mL (12-29-23 @ 05:52)  Free Thyroxine, Serum: 1.6 ng/dL (12-29-23 @ 05:52)  Thyroid Stimulating Hormone, Serum: 0.34 uIU/mL (12-22-23 @ 04:51)  Thyroid Stimulating Hormone, Serum: 1.51 uIU/mL (12-16-23 @ 15:20)  Thyroid Stimulating Hormone, Serum: 3.02 uIU/mL (12-16-23 @ 10:18)

## 2024-01-02 NOTE — DIETITIAN INITIAL EVALUATION ADULT - NSICDXPASTMEDICALHX_GEN_ALL_CORE_FT
Detail Level: Simple PAST MEDICAL HISTORY:  Asthma     Cataract     Cervical Stenosis of Spine     CKD (chronic kidney disease) ~ III    Congestive heart failure ~ HFpEF    Deep Vein Thrombosis (DVT) Left leg, 2004, treated and resolved    Diabetes Mellitus Type II     Dyslipidemia     Endometrial Hyperplasia     Gait difficulty ~ u ses walker    HTN (Hypertension)     Insomnia     Morbid Obesity     On home oxygen therapy     Spinal Stenosis, Lumbar     Uterine cancer     Vitamin D deficiency      Render Risk Assessment In Note?: no Additional Notes: -Discussed that patient is not having true hair loss from the scalp, but is having hair breakage due to fine, brittle hair. \\n-Recommend she use a heat protectant when using a blow dryer or heat styling her hair.\\n-She may try Viviscal or Nutrafol for a supplement which may help strengthen her hair. \\n-She may also just need to keep a shorter hairstyle due to the nature of her hair. \\n-Discussed that she may discontinue Rogaine at this time as she does not have true hair loss, so this will not provide her with much benefit.

## 2024-01-02 NOTE — CHART NOTE - NSCHARTNOTEFT_GEN_A_CORE
PA NOTE-MEDICINE    Called by RN due to Pt appearing lethargic and acting slightly Bizarre "Asking RN what type of Pets she has". PA NOTE-MEDICINE     RN states when she woke Pt Up to examine her Pt seemed slightly lethargic and ? confused  Pt admitted for acute on chronic respiratory failure sec to Acute HFpEF exacerbation with likely with Superimposed bacterial Pneumonia in setting of RSV, Severe pulm HTN, new Afib..CT scan which also showed pneumonia and pulmonary HTN. S/P [facility] 12/29 with mild LAD disease,pulm htn.  Pt has active order for Bipap HS which is not yet on pt.    Pt Denies: CP HA SOB Dizziness N/V     FS: 138   Vitals:   BP: 97/55  HR: 108  RR: 18  T: 97.7 po  O2 sat: 97 4 L nc     General: WD Obese F sitting up in Bed NAD  A @ O x 4  Answers questions appropriately Fluent    Neuro: No Gross deficits  Motor/Strength: Unable to Test B/L LE due to deconditioning   Remaining Neuro: Baseline     A/P Eval Pt for ? Lethargy/Confusion  Most likely due to Pt just being awakened   Pt placed on nightly HS Bipap  Continue to Monitor Pt   Recall PA for any changes in Pt status   Will sign out to AM Team to Follow PA NOTE-MEDICINE     RN states when she woke Pt Up to examine her Pt seemed slightly lethargic and ? confused  Pt admitted for acute on chronic respiratory failure sec to Acute HFpEF exacerbation with likely with Superimposed bacterial Pneumonia in setting of RSV, Severe pulm HTN, new Afib..CT scan which also showed pneumonia and pulmonary HTN. S/P [facility] 12/29 with mild LAD disease,pulm htn.  Pt has active order for Bipap HS which is not yet on pt.    Pt Denies: CP HA SOB Dizziness N/V     FS: 138   Vitals:   BP: 97/55  HR: 108  RR: 18  T: 97.7 po  O2 sat: 97 4 L nc     General: WD Obese F sitting up in Bed NAD  A @ O x 4  Answers questions appropriately Fluent    Neuro: No Gross deficits  Motor/Strength: Unable to Test B/L LE due to deconditioning   Remaining Neuro: Baseline     A/P Eval Pt for ? Lethargy/Confusion  Most likely due to Pt just being awakened   Pt placed on nightly HS Bipap  Continue to Monitor Pt   Recall PA for any changes in Pt status   Will sign out to AM Team to Follow

## 2024-01-02 NOTE — PROGRESS NOTE ADULT - NS ATTEND AMEND GEN_ALL_CORE FT
plan discussed with NP and changes incorporated in the note.    agree with the plan above  patient seen and examined  morbidly obese, sounding depressed  has rsv and new afib, copd/chf exacerbation, new afib, severe phtn  FS well controlled on current regimen  having right knee pain, which patient endorsed as being new

## 2024-01-02 NOTE — PROGRESS NOTE ADULT - ASSESSMENT
67 yo female with HTN, HLD, DM2, HFpEF, CKD III, DVT, Uterine CA, COPD on home o2 who presented with complaints of shortness of breath. Patient reports that she has been sick all week and was recently started on Vantin and steroids while at the nursing home. Pt admitted for acute on chronic respiratory failure sec to Acute HFpEF exacerbation with likely with Superimposed bacterial Pneumonia in setting of RSV, Severe pulm HTN, new Afib..CT scan which also showed pneumonia and pulmonary HTN. S/P C 12/29 with mild LAD disease,pulm htn. Pt was wean off from BIPAP,now on NC. and was given IV lasix,Started on heparin drip and changed to Xarelto after cath.    New afib RVR  - hr high  - bb was hold am for low bp  - Will resume bb,will adjust dose  - Xarelto 15 mg qd per renal function  - EP consulted, no DCCV given resp issues  - F/U EP rec  - TTE W or WO Ultrasound Enhancing Agent (12.18.23)EF 60 to 65%. There is moderate (grade 2) left ventricular diastolic dysfunction, with normal filling pressure.      # Acute on chronic respiratory failure  # Acute HFpEF exacerbation   # Superimposed bacterial Pneumonia in setting of RSV  # Severe pulm HTN  - so2 4 L.home 4 L NC  - CT chest reads worsening middle lobe PNA  - S/p [facility] mild LAD disease/ Severe pulmonary HTN 70 / LVEDP stable  - sputum clx ordered for last several days, pt unable to bring up sputum  - repeat MRSA PCR negative  - ID consulted, WBC likely reactive to steroids  - antibiotics stopped on 12/28, WBC trended down  - pt high risk for recurrent aspiration  - repeat blood clx NGTD  - S/P  steroids   - levalbuterol and ipratropium q6hrs, symbicort BID   - Torsemide  - BIPAP qhs  - f/u pulm rec  -F/U Cardio rec    # previous suspicion for PE/ DVT on admission  - Patient has empirically been treated for PE/DVT since August  - No scans noted to have PE/DVT  - duplex lower ext to without dvt   - d dimer negative    # JACQUES- improving  - likely combination of pre-renal and ATN  - lasix on hold  - will pre-hydrate before and after cath  - nephrology consulted  - trend BMP    #Chronic pain  - continue home Pain meds of Oxycodone 60 mg q12h and Oxycodone 5mg q6h PRN  -cut down oxycodone 30 mg bid as bp soft    # HLD  - Atorvastatin for rosuvastatin    # COPD  -oxygen supplement  - taper off  steroids   - Symbicort, Spiriva, albuterol  - pulm following     #DM  #steroid induced hyperglycemia  -episode of hypoglycemia   -A1C 8.3  - cut down lantus 30 u bid  -cut down 25 u meal coverage  -lispro sliding scale  -f/u endocrine rec    DVT prophylaxis: Xarelto   Dispo: [facility] Pt-understood risk of hypotension,afib rvr.   ramon rn 69 yo female with HTN, HLD, DM2, HFpEF, CKD III, DVT, Uterine CA, COPD on home o2 who presented with complaints of shortness of breath. Patient reports that she has been sick all week and was recently started on Vantin and steroids while at the nursing home. Pt admitted for acute on chronic respiratory failure sec to Acute HFpEF exacerbation with likely with Superimposed bacterial Pneumonia in setting of RSV, Severe pulm HTN, new Afib..CT scan which also showed pneumonia and pulmonary HTN. S/P C 12/29 with mild LAD disease,pulm htn. Pt was wean off from BIPAP,now on NC. and was given IV lasix,Started on heparin drip and changed to Xarelto after cath.    New afib RVR  - hr high  - bb was hold am for low bp  - Will resume bb,will adjust dose  - Xarelto 15 mg qd per renal function  - EP consulted, no DCCV given resp issues  - F/U EP rec  - TTE W or WO Ultrasound Enhancing Agent (12.18.23)EF 60 to 65%. There is moderate (grade 2) left ventricular diastolic dysfunction, with normal filling pressure.      # Acute on chronic respiratory failure  # Acute HFpEF exacerbation   # Superimposed bacterial Pneumonia in setting of RSV  # Severe pulm HTN  - so2 4 L.home 4 L NC  - CT chest reads worsening middle lobe PNA  - S/p [facility] mild LAD disease/ Severe pulmonary HTN 70 / LVEDP stable  - sputum clx ordered for last several days, pt unable to bring up sputum  - repeat MRSA PCR negative  - ID consulted, WBC likely reactive to steroids  - antibiotics stopped on 12/28, WBC trended down  - pt high risk for recurrent aspiration  - repeat blood clx NGTD  - S/P  steroids   - levalbuterol and ipratropium q6hrs, symbicort BID   - Torsemide  - BIPAP qhs  - f/u pulm rec  -F/U Cardio rec    # previous suspicion for PE/ DVT on admission  - Patient has empirically been treated for PE/DVT since August  - No scans noted to have PE/DVT  - duplex lower ext to without dvt   - d dimer negative    # JACQUES- improving  - likely combination of pre-renal and ATN  - lasix on hold  - will pre-hydrate before and after cath  - nephrology consulted  - trend BMP    #Chronic pain  - continue home Pain meds of Oxycodone 60 mg q12h and Oxycodone 5mg q6h PRN  -cut down oxycodone 30 mg bid as bp soft    # HLD  - Atorvastatin for rosuvastatin    # COPD  -oxygen supplement  - taper off  steroids   - Symbicort, Spiriva, albuterol  - pulm following     #DM  #steroid induced hyperglycemia  -episode of hypoglycemia   -A1C 8.3  - cut down lantus 30 u bid  -cut down 25 u meal coverage  -lispro sliding scale  -f/u endocrine rec    DVT prophylaxis: Xarelto   Dispo: [facility] Pt-understood risk of hypotension,afib rvr.   ramon rn 67 yo female with HTN, HLD, DM2, HFpEF, CKD III, DVT, Uterine CA, COPD on home o2 who presented with complaints of shortness of breath. Patient reports that she has been sick all week and was recently started on Vantin and steroids while at the nursing home. Pt admitted for acute on chronic respiratory failure sec to Acute HFpEF exacerbation with likely with Superimposed bacterial Pneumonia in setting of RSV, Severe pulm HTN, new Afib..CT scan which also showed pneumonia and pulmonary HTN. S/P C 12/29 with mild LAD disease,pulm htn. Pt was wean off from BIPAP,now on NC. and was given IV lasix,Started on heparin drip and changed to Xarelto after cath.    New afib RVR  - hr high  - bb was hold am for low bp  - Will resume bb,will adjust dose  - Xarelto 15 mg qd per renal function  - EP consulted, no DCCV given resp issues  - F/U EP rec  - TTE W or WO Ultrasound Enhancing Agent (12.18.23)EF 60 to 65%. There is moderate (grade 2) left ventricular diastolic dysfunction, with normal filling pressure.      # Acute on chronic respiratory failure  # Acute HFpEF exacerbation   # Superimposed bacterial Pneumonia in setting of RSV  # Severe pulm HTN  - so2 4 L.home 4 L NC  - CT chest reads worsening middle lobe PNA  - S/p [facility] mild LAD disease/ Severe pulmonary HTN 70 / LVEDP stable  - sputum clx ordered for last several days, pt unable to bring up sputum  - repeat MRSA PCR negative  - ID consulted, WBC likely reactive to steroids  - antibiotics stopped on 12/28, WBC trended down  - pt high risk for recurrent aspiration  - repeat blood clx NGTD  - S/P  steroids   - levalbuterol and ipratropium q6hrs, symbicort BID   - Torsemide  - BIPAP qhs  - f/u pulm rec  -F/U Cardio rec      # JACQUES  - likely combination of pre-renal and ATN  - lasix on hold  -cr 1.8  - nephrology consulted  - trend BMP    # previous suspicion for PE/ DVT on admission  - Patient has empirically been treated for PE/DVT since August  - No scans noted to have PE/DVT  - duplex lower ext to without dvt   - d dimer negative        #Chronic pain  - continue home Pain meds of Oxycodone 60 mg q12h and Oxycodone 5mg q6h PRN  -cut down oxycodone 30 mg bid as bp soft    # HLD  - Atorvastatin for rosuvastatin    # COPD  -oxygen supplement  - taper off  steroids   - Symbicort, Spiriva, albuterol  - pulm following     #DM  #steroid induced hyperglycemia  -episode of hypoglycemia   -A1C 8.3  - cut down lantus 30 u bid  -cut down 25 u meal coverage  -lispro sliding scale  -f/u endocrine rec    DVT prophylaxis: Xarelto   Dispo: [facility] Pt-understood risk of hypotension,afib rvr.   ramon rn 69 yo female with HTN, HLD, DM2, HFpEF, CKD III, DVT, Uterine CA, COPD on home o2 who presented with complaints of shortness of breath. Patient reports that she has been sick all week and was recently started on Vantin and steroids while at the nursing home. Pt admitted for acute on chronic respiratory failure sec to Acute HFpEF exacerbation with likely with Superimposed bacterial Pneumonia in setting of RSV, Severe pulm HTN, new Afib..CT scan which also showed pneumonia and pulmonary HTN. S/P C 12/29 with mild LAD disease,pulm htn. Pt was wean off from BIPAP,now on NC. and was given IV lasix,Started on heparin drip and changed to Xarelto after cath.    New afib RVR  - hr high  - bb was hold am for low bp  - Will resume bb,will adjust dose  - Xarelto 15 mg qd per renal function  - EP consulted, no DCCV given resp issues  - F/U EP rec  - TTE W or WO Ultrasound Enhancing Agent (12.18.23)EF 60 to 65%. There is moderate (grade 2) left ventricular diastolic dysfunction, with normal filling pressure.      # Acute on chronic respiratory failure  # Acute HFpEF exacerbation   # Superimposed bacterial Pneumonia in setting of RSV  # Severe pulm HTN  - so2 4 L.home 4 L NC  - CT chest reads worsening middle lobe PNA  - S/p [facility] mild LAD disease/ Severe pulmonary HTN 70 / LVEDP stable  - sputum clx ordered for last several days, pt unable to bring up sputum  - repeat MRSA PCR negative  - ID consulted, WBC likely reactive to steroids  - antibiotics stopped on 12/28, WBC trended down  - pt high risk for recurrent aspiration  - repeat blood clx NGTD  - S/P  steroids   - levalbuterol and ipratropium q6hrs, symbicort BID   - Torsemide  - BIPAP qhs  - f/u pulm rec  -F/U Cardio rec      # JACQUES  - likely combination of pre-renal and ATN  - lasix on hold  -cr 1.8  - nephrology consulted  - trend BMP    # previous suspicion for PE/ DVT on admission  - Patient has empirically been treated for PE/DVT since August  - No scans noted to have PE/DVT  - duplex lower ext to without dvt   - d dimer negative        #Chronic pain  - continue home Pain meds of Oxycodone 60 mg q12h and Oxycodone 5mg q6h PRN  -cut down oxycodone 30 mg bid as bp soft    # HLD  - Atorvastatin for rosuvastatin    # COPD  -oxygen supplement  - taper off  steroids   - Symbicort, Spiriva, albuterol  - pulm following     #DM  #steroid induced hyperglycemia  -episode of hypoglycemia   -A1C 8.3  - cut down lantus 30 u bid  -cut down 25 u meal coverage  -lispro sliding scale  -f/u endocrine rec    DVT prophylaxis: Xarelto   Dispo: [facility] Pt-understood risk of hypotension,afib rvr.   ramon rn

## 2024-01-02 NOTE — PROGRESS NOTE ADULT - SUBJECTIVE AND OBJECTIVE BOX
Patient is a 68y old  Female who presents with a chief complaint of SOB (02 Jan 2024 13:11)      pt is asking po pain medicine.understtood risk of hypotension. 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 denies chest pain,sob.  REVIEW OF SYSTEMS: All systems are reviewed and found to be negative except above    MEDICATIONS  (STANDING):  acetylcysteine 20%  Inhalation 4 milliLiter(s) Inhalation daily  atorvastatin 80 milliGRAM(s) Oral at bedtime  budesonide  80 MICROgram(s)/formoterol 4.5 MICROgram(s) Inhaler 2 Puff(s) Inhalation two times a day  chlorhexidine 2% Cloths 1 Application(s) Topical <User Schedule>  clotrimazole 1% Cream 1 Application(s) Topical two times a day  dextrose 5%. 1000 milliLiter(s) (50 mL/Hr) IV Continuous <Continuous>  dextrose 5%. 1000 milliLiter(s) (100 mL/Hr) IV Continuous <Continuous>  dextrose 50% Injectable 25 Gram(s) IV Push once  dextrose 50% Injectable 12.5 Gram(s) IV Push once  dextrose 50% Injectable 25 Gram(s) IV Push once  famotidine    Tablet 20 milliGRAM(s) Oral daily  glucagon  Injectable 1 milliGRAM(s) IntraMuscular once  insulin glargine Injectable (LANTUS) 30 Unit(s) SubCutaneous at bedtime  insulin glargine Injectable (LANTUS) 30 Unit(s) SubCutaneous every morning  insulin lispro (ADMELOG) corrective regimen sliding scale   SubCutaneous three times a day before meals  insulin lispro Injectable (ADMELOG) 25 Unit(s) SubCutaneous three times a day before meals  ipratropium    for Nebulization 500 MICROGram(s) Nebulizer every 6 hours  lactulose Syrup 20 Gram(s) Oral daily  levalbuterol Inhalation 0.63 milliGRAM(s) Inhalation every 6 hours  metoprolol tartrate 50 milliGRAM(s) Oral every 8 hours  nystatin Powder 1 Application(s) Topical two times a day  oxyCODONE  ER Tablet 30 milliGRAM(s) Oral every 12 hours  polyethylene glycol 3350 17 Gram(s) Oral daily  rivaroxaban 15 milliGRAM(s) Oral daily  senna 2 Tablet(s) Oral at bedtime  torsemide 20 milliGRAM(s) Oral daily    MEDICATIONS  (PRN):  acetaminophen     Tablet .. 650 milliGRAM(s) Oral every 6 hours PRN Temp greater or equal to 38C (100.4F), Mild Pain (1 - 3)  ALPRAZolam 0.25 milliGRAM(s) Oral every 8 hours PRN for anxiety/Sleep  aluminum hydroxide/magnesium hydroxide/simethicone Suspension 30 milliLiter(s) Oral once PRN Dyspepsia  dextrose Oral Gel 15 Gram(s) Oral once PRN Blood Glucose LESS THAN 70 milliGRAM(s)/deciliter  diphenhydrAMINE 25 milliGRAM(s) Oral every 6 hours PRN Rash and/or Itching  hydrALAZINE Injectable 10 milliGRAM(s) IV Push every 4 hours PRN for systolic bp > 160  melatonin 3 milliGRAM(s) Oral at bedtime PRN Insomnia  metoprolol tartrate Injectable 5 milliGRAM(s) IV Push every 6 hours PRN heart rate sustaining greater than 130  ondansetron Injectable 4 milliGRAM(s) IV Push every 8 hours PRN Nausea and/or Vomiting  oxyCODONE    IR 5 milliGRAM(s) Oral every 6 hours PRN Moderate Pain (4 - 6)      CAPILLARY BLOOD GLUCOSE      POCT Blood Glucose.: 217 mg/dL (02 Jan 2024 12:00)  POCT Blood Glucose.: 192 mg/dL (02 Jan 2024 08:04)  POCT Blood Glucose.: 194 mg/dL (02 Jan 2024 05:25)  POCT Blood Glucose.: 164 mg/dL (01 Jan 2024 22:52)  POCT Blood Glucose.: 138 mg/dL (01 Jan 2024 21:29)  POCT Blood Glucose.: 83 mg/dL (01 Jan 2024 18:34)  POCT Blood Glucose.: 88 mg/dL (01 Jan 2024 17:05)  POCT Blood Glucose.: 160 mg/dL (01 Jan 2024 15:29)  POCT Blood Glucose.: 52 mg/dL (01 Jan 2024 15:19)  POCT Blood Glucose.: 59 mg/dL (01 Jan 2024 15:16)    I&O's Summary    01 Jan 2024 07:01  -  02 Jan 2024 07:00  --------------------------------------------------------  IN: 170 mL / OUT: 1625 mL / NET: -1455 mL        PHYSICAL EXAM:  Vital Signs Last 24 Hrs  T(C): 36.7 (02 Jan 2024 08:10), Max: 37.1 (02 Jan 2024 01:29)  T(F): 98 (02 Jan 2024 08:10), Max: 98.7 (02 Jan 2024 01:29)  HR: 112 (02 Jan 2024 12:23) (106 - 131)  BP: 96/57 (02 Jan 2024 12:23) (93/53 - 111/70)  BP(mean): --  RR: 21 (02 Jan 2024 08:10) (18 - 21)  SpO2: 100% (02 Jan 2024 08:10) (94% - 100%)    Parameters below as of 02 Jan 2024 08:10  Patient On (Oxygen Delivery Method): nasal cannula        CONSTITUTIONAL: NAD,obese  EYES: PERRLA; conjunctiva and sclera clear  ENMT: Moist oral mucosa,   RESPIRATORY: Normal respiratory effort; lungs are clear to auscultation bilaterally  CARDIOVASCULAR: IRR normal S1 and S2, no murmur   EXTS: Chronic lower extremity edema; Peripheral pulses are 2+ bilaterally  ABDOMEN: Nontender to palpation, normoactive bowel sounds, no rebound/guarding;   MUSCLOSKELETAL:  no joint swelling or tenderness to palpation  PSYCH: rude ,angry,argumentive.pt states"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  NEUROLOGY: A+O to person, place, and time; CN 2-12 are intact and symmetric; no gross sensory deficits;       LABS:                        13.2   14.92 )-----------( 142      ( 02 Jan 2024 05:35 )             38.4     01-02    130<L>  |  93<L>  |  74.6<H>  ----------------------------<  174<H>  5.1   |  22.0  |  1.85<H>    Ca    9.3      02 Jan 2024 00:05            Urinalysis Basic - ( 02 Jan 2024 00:05 )    Color: x / Appearance: x / SG: x / pH: x  Gluc: 174 mg/dL / Ketone: x  / Bili: x / Urobili: x   Blood: x / Protein: x / Nitrite: x   Leuk Esterase: x / RBC: x / WBC x   Sq Epi: x / Non Sq Epi: x / Bacteria: x          RADIOLOGY & ADDITIONAL TESTS:  Results Reviewed:

## 2024-01-02 NOTE — PROGRESS NOTE ADULT - NUTRITIONAL ASSESSMENT
This patient has been assessed with a concern for Malnutrition and has been determined to have a diagnosis/diagnoses of Severe protein-calorie malnutrition and Morbid obesity (BMI > 40).    This patient is being managed with:   Diet Consistent Carbohydrate w/Evening Snack-  1500mL Fluid Restriction ([id number])  Low Sodium  Entered: Dec 19 2023  6:23PM   This patient has been assessed with a concern for Malnutrition and has been determined to have a diagnosis/diagnoses of Severe protein-calorie malnutrition and Morbid obesity (BMI > 40).    This patient is being managed with:   Diet Consistent Carbohydrate w/Evening Snack-  1500mL Fluid Restriction ([id number])  Low Sodium  Entered: Dec 19 2023  6:23PM

## 2024-01-03 LAB
ANION GAP SERPL CALC-SCNC: 14 MMOL/L — SIGNIFICANT CHANGE UP (ref 5–17)
ANION GAP SERPL CALC-SCNC: 14 MMOL/L — SIGNIFICANT CHANGE UP (ref 5–17)
BUN SERPL-MCNC: 74.3 MG/DL — HIGH (ref 8–20)
BUN SERPL-MCNC: 74.3 MG/DL — HIGH (ref 8–20)
CALCIUM SERPL-MCNC: 9.2 MG/DL — SIGNIFICANT CHANGE UP (ref 8.4–10.5)
CALCIUM SERPL-MCNC: 9.2 MG/DL — SIGNIFICANT CHANGE UP (ref 8.4–10.5)
CHLORIDE SERPL-SCNC: 96 MMOL/L — SIGNIFICANT CHANGE UP (ref 96–108)
CHLORIDE SERPL-SCNC: 96 MMOL/L — SIGNIFICANT CHANGE UP (ref 96–108)
CO2 SERPL-SCNC: 29 MMOL/L — SIGNIFICANT CHANGE UP (ref 22–29)
CO2 SERPL-SCNC: 29 MMOL/L — SIGNIFICANT CHANGE UP (ref 22–29)
CREAT SERPL-MCNC: 1.73 MG/DL — HIGH (ref 0.5–1.3)
CREAT SERPL-MCNC: 1.73 MG/DL — HIGH (ref 0.5–1.3)
EGFR: 32 ML/MIN/1.73M2 — LOW
EGFR: 32 ML/MIN/1.73M2 — LOW
GLUCOSE BLDC GLUCOMTR-MCNC: 181 MG/DL — HIGH (ref 70–99)
GLUCOSE BLDC GLUCOMTR-MCNC: 181 MG/DL — HIGH (ref 70–99)
GLUCOSE BLDC GLUCOMTR-MCNC: 208 MG/DL — HIGH (ref 70–99)
GLUCOSE BLDC GLUCOMTR-MCNC: 208 MG/DL — HIGH (ref 70–99)
GLUCOSE BLDC GLUCOMTR-MCNC: 221 MG/DL — HIGH (ref 70–99)
GLUCOSE BLDC GLUCOMTR-MCNC: 221 MG/DL — HIGH (ref 70–99)
GLUCOSE BLDC GLUCOMTR-MCNC: 227 MG/DL — HIGH (ref 70–99)
GLUCOSE BLDC GLUCOMTR-MCNC: 227 MG/DL — HIGH (ref 70–99)
GLUCOSE SERPL-MCNC: 210 MG/DL — HIGH (ref 70–99)
GLUCOSE SERPL-MCNC: 210 MG/DL — HIGH (ref 70–99)
HCT VFR BLD CALC: 37.2 % — SIGNIFICANT CHANGE UP (ref 34.5–45)
HCT VFR BLD CALC: 37.2 % — SIGNIFICANT CHANGE UP (ref 34.5–45)
HGB BLD-MCNC: 12.1 G/DL — SIGNIFICANT CHANGE UP (ref 11.5–15.5)
HGB BLD-MCNC: 12.1 G/DL — SIGNIFICANT CHANGE UP (ref 11.5–15.5)
MCHC RBC-ENTMCNC: 29.7 PG — SIGNIFICANT CHANGE UP (ref 27–34)
MCHC RBC-ENTMCNC: 29.7 PG — SIGNIFICANT CHANGE UP (ref 27–34)
MCHC RBC-ENTMCNC: 32.5 GM/DL — SIGNIFICANT CHANGE UP (ref 32–36)
MCHC RBC-ENTMCNC: 32.5 GM/DL — SIGNIFICANT CHANGE UP (ref 32–36)
MCV RBC AUTO: 91.2 FL — SIGNIFICANT CHANGE UP (ref 80–100)
MCV RBC AUTO: 91.2 FL — SIGNIFICANT CHANGE UP (ref 80–100)
PLATELET # BLD AUTO: 124 K/UL — LOW (ref 150–400)
PLATELET # BLD AUTO: 124 K/UL — LOW (ref 150–400)
POTASSIUM SERPL-MCNC: 4.2 MMOL/L — SIGNIFICANT CHANGE UP (ref 3.5–5.3)
POTASSIUM SERPL-MCNC: 4.2 MMOL/L — SIGNIFICANT CHANGE UP (ref 3.5–5.3)
POTASSIUM SERPL-SCNC: 4.2 MMOL/L — SIGNIFICANT CHANGE UP (ref 3.5–5.3)
POTASSIUM SERPL-SCNC: 4.2 MMOL/L — SIGNIFICANT CHANGE UP (ref 3.5–5.3)
RBC # BLD: 4.08 M/UL — SIGNIFICANT CHANGE UP (ref 3.8–5.2)
RBC # BLD: 4.08 M/UL — SIGNIFICANT CHANGE UP (ref 3.8–5.2)
RBC # FLD: 14.4 % — SIGNIFICANT CHANGE UP (ref 10.3–14.5)
RBC # FLD: 14.4 % — SIGNIFICANT CHANGE UP (ref 10.3–14.5)
SODIUM SERPL-SCNC: 139 MMOL/L — SIGNIFICANT CHANGE UP (ref 135–145)
SODIUM SERPL-SCNC: 139 MMOL/L — SIGNIFICANT CHANGE UP (ref 135–145)
WBC # BLD: 14.53 K/UL — HIGH (ref 3.8–10.5)
WBC # BLD: 14.53 K/UL — HIGH (ref 3.8–10.5)
WBC # FLD AUTO: 14.53 K/UL — HIGH (ref 3.8–10.5)
WBC # FLD AUTO: 14.53 K/UL — HIGH (ref 3.8–10.5)

## 2024-01-03 PROCEDURE — 99232 SBSQ HOSP IP/OBS MODERATE 35: CPT

## 2024-01-03 RX ORDER — METOPROLOL TARTRATE 50 MG
75 TABLET ORAL EVERY 8 HOURS
Refills: 0 | Status: DISCONTINUED | OUTPATIENT
Start: 2024-01-03 | End: 2024-01-08

## 2024-01-03 RX ORDER — INSULIN LISPRO 100/ML
28 VIAL (ML) SUBCUTANEOUS
Refills: 0 | Status: DISCONTINUED | OUTPATIENT
Start: 2024-01-03 | End: 2024-01-04

## 2024-01-03 RX ADMIN — NYSTATIN CREAM 1 APPLICATION(S): 100000 CREAM TOPICAL at 17:42

## 2024-01-03 RX ADMIN — OXYCODONE HYDROCHLORIDE 30 MILLIGRAM(S): 5 TABLET ORAL at 17:42

## 2024-01-03 RX ADMIN — BUDESONIDE AND FORMOTEROL FUMARATE DIHYDRATE 2 PUFF(S): 160; 4.5 AEROSOL RESPIRATORY (INHALATION) at 09:30

## 2024-01-03 RX ADMIN — Medication 75 MILLIGRAM(S): at 22:54

## 2024-01-03 RX ADMIN — INSULIN GLARGINE 30 UNIT(S): 100 INJECTION, SOLUTION SUBCUTANEOUS at 08:45

## 2024-01-03 RX ADMIN — Medication 28 UNIT(S): at 12:43

## 2024-01-03 RX ADMIN — CHLORHEXIDINE GLUCONATE 1 APPLICATION(S): 213 SOLUTION TOPICAL at 05:46

## 2024-01-03 RX ADMIN — Medication 1 APPLICATION(S): at 17:42

## 2024-01-03 RX ADMIN — Medication 4: at 08:46

## 2024-01-03 RX ADMIN — LACTULOSE 20 GRAM(S): 10 SOLUTION ORAL at 12:46

## 2024-01-03 RX ADMIN — OXYCODONE HYDROCHLORIDE 5 MILLIGRAM(S): 5 TABLET ORAL at 23:01

## 2024-01-03 RX ADMIN — Medication 4: at 12:42

## 2024-01-03 RX ADMIN — FAMOTIDINE 20 MILLIGRAM(S): 10 INJECTION INTRAVENOUS at 12:42

## 2024-01-03 RX ADMIN — Medication 4: at 17:41

## 2024-01-03 RX ADMIN — Medication 20 MILLIGRAM(S): at 05:46

## 2024-01-03 RX ADMIN — Medication 500 MICROGRAM(S): at 03:53

## 2024-01-03 RX ADMIN — ATORVASTATIN CALCIUM 80 MILLIGRAM(S): 80 TABLET, FILM COATED ORAL at 22:54

## 2024-01-03 RX ADMIN — Medication 500 MICROGRAM(S): at 14:39

## 2024-01-03 RX ADMIN — LEVALBUTEROL 0.63 MILLIGRAM(S): 1.25 SOLUTION, CONCENTRATE RESPIRATORY (INHALATION) at 09:30

## 2024-01-03 RX ADMIN — INSULIN GLARGINE 30 UNIT(S): 100 INJECTION, SOLUTION SUBCUTANEOUS at 23:01

## 2024-01-03 RX ADMIN — RIVAROXABAN 15 MILLIGRAM(S): KIT at 12:49

## 2024-01-03 RX ADMIN — LEVALBUTEROL 0.63 MILLIGRAM(S): 1.25 SOLUTION, CONCENTRATE RESPIRATORY (INHALATION) at 14:39

## 2024-01-03 RX ADMIN — Medication 75 MILLIGRAM(S): at 13:04

## 2024-01-03 RX ADMIN — NYSTATIN CREAM 1 APPLICATION(S): 100000 CREAM TOPICAL at 05:46

## 2024-01-03 RX ADMIN — ONDANSETRON 4 MILLIGRAM(S): 8 TABLET, FILM COATED ORAL at 17:34

## 2024-01-03 RX ADMIN — Medication 28 UNIT(S): at 17:41

## 2024-01-03 RX ADMIN — OXYCODONE HYDROCHLORIDE 30 MILLIGRAM(S): 5 TABLET ORAL at 06:49

## 2024-01-03 RX ADMIN — POLYETHYLENE GLYCOL 3350 17 GRAM(S): 17 POWDER, FOR SOLUTION ORAL at 12:46

## 2024-01-03 RX ADMIN — LEVALBUTEROL 0.63 MILLIGRAM(S): 1.25 SOLUTION, CONCENTRATE RESPIRATORY (INHALATION) at 03:53

## 2024-01-03 RX ADMIN — Medication 1 APPLICATION(S): at 05:46

## 2024-01-03 RX ADMIN — Medication 25 UNIT(S): at 08:46

## 2024-01-03 RX ADMIN — Medication 4 MILLILITER(S): at 09:30

## 2024-01-03 RX ADMIN — Medication 500 MICROGRAM(S): at 09:30

## 2024-01-03 RX ADMIN — Medication 0.25 MILLIGRAM(S): at 22:55

## 2024-01-03 RX ADMIN — SENNA PLUS 2 TABLET(S): 8.6 TABLET ORAL at 22:54

## 2024-01-03 RX ADMIN — Medication 50 MILLIGRAM(S): at 05:46

## 2024-01-03 NOTE — PROGRESS NOTE ADULT - NUTRITIONAL ASSESSMENT
This patient has been assessed with a concern for Malnutrition and has been determined to have a diagnosis/diagnoses of Severe protein-calorie malnutrition and Morbid obesity (BMI > 40).    This patient is being managed with:   Diet Consistent Carbohydrate w/Evening Snack-  1500mL Fluid Restriction ([id number])  Low Sodium  Entered: Dec 19 2023  6:23PM   This patient has been assessed with a concern for Malnutrition and has been determined to have a diagnosis/diagnoses of Severe protein-calorie malnutrition and Morbid obesity (BMI > 40).    This patient is being managed with:   Diet Consistent Carbohydrate w/Evening Snack-  1500mL Fluid Restriction ([id number])  Low Sodium  Entered: Dec 19 2023  6:23PM

## 2024-01-03 NOTE — PROGRESS NOTE ADULT - ASSESSMENT
67 yo female, morbidly obese, with HTN, HLD, DM2, HFpEF, CKD III, DVT, Uterine CA, COPD on home o2 who presented with complaints of shortness of breath. Admitted with acute on chronic resp failure and acute HFpEF exacerbation with likely with Superimposed bacterial Pneumonia in setting of RSV and new onset AFib and worsening GFR. S/p [facility] with mild LAD disease, Severe pulmonary HTN, LVEDP stable    1. T2DM complicated by CKD- BG remain elevated but improving  - Increase premeal admelog to 28units TID with meals  - Continue lantus 30 units BID  - Sliding scale coverage    2. HLD  - Continue statin    3. Right leg pain  - US obtained without evidence of stenosis/thrombus  - No intervention as per vascular   69 yo female, morbidly obese, with HTN, HLD, DM2, HFpEF, CKD III, DVT, Uterine CA, COPD on home o2 who presented with complaints of shortness of breath. Admitted with acute on chronic resp failure and acute HFpEF exacerbation with likely with Superimposed bacterial Pneumonia in setting of RSV and new onset AFib and worsening GFR. S/p [facility] with mild LAD disease, Severe pulmonary HTN, LVEDP stable    1. T2DM complicated by CKD- BG remain elevated but improving  - Increase premeal admelog to 28units TID with meals  - Continue lantus 30 units BID  - Sliding scale coverage    2. HLD  - Continue statin    3. Right leg pain  - US obtained without evidence of stenosis/thrombus  - No intervention as per vascular

## 2024-01-03 NOTE — PROGRESS NOTE ADULT - SUBJECTIVE AND OBJECTIVE BOX
Patient is a 68y old  Female who presents with a chief complaint of SOB (02 Jan 2024 13:47)      pt denies cp,sob,n/v/fever.chill,acute pain.  REVIEW OF SYSTEMS: All systems are reviewed and found to be negative except above    MEDICATIONS  (STANDING):  acetylcysteine 20%  Inhalation 4 milliLiter(s) Inhalation daily  atorvastatin 80 milliGRAM(s) Oral at bedtime  budesonide  80 MICROgram(s)/formoterol 4.5 MICROgram(s) Inhaler 2 Puff(s) Inhalation two times a day  chlorhexidine 2% Cloths 1 Application(s) Topical <User Schedule>  clotrimazole 1% Cream 1 Application(s) Topical two times a day  dextrose 5%. 1000 milliLiter(s) (100 mL/Hr) IV Continuous <Continuous>  dextrose 5%. 1000 milliLiter(s) (50 mL/Hr) IV Continuous <Continuous>  dextrose 50% Injectable 12.5 Gram(s) IV Push once  dextrose 50% Injectable 25 Gram(s) IV Push once  dextrose 50% Injectable 25 Gram(s) IV Push once  famotidine    Tablet 20 milliGRAM(s) Oral daily  glucagon  Injectable 1 milliGRAM(s) IntraMuscular once  insulin glargine Injectable (LANTUS) 30 Unit(s) SubCutaneous every morning  insulin glargine Injectable (LANTUS) 30 Unit(s) SubCutaneous at bedtime  insulin lispro (ADMELOG) corrective regimen sliding scale   SubCutaneous three times a day before meals  insulin lispro Injectable (ADMELOG) 25 Unit(s) SubCutaneous three times a day before meals  ipratropium    for Nebulization 500 MICROGram(s) Nebulizer every 6 hours  lactulose Syrup 20 Gram(s) Oral daily  levalbuterol Inhalation 0.63 milliGRAM(s) Inhalation every 6 hours  metoprolol tartrate 50 milliGRAM(s) Oral every 8 hours  nystatin Powder 1 Application(s) Topical two times a day  oxyCODONE  ER Tablet 30 milliGRAM(s) Oral every 12 hours  polyethylene glycol 3350 17 Gram(s) Oral daily  rivaroxaban 15 milliGRAM(s) Oral daily  senna 2 Tablet(s) Oral at bedtime  torsemide 20 milliGRAM(s) Oral daily    MEDICATIONS  (PRN):  acetaminophen     Tablet .. 650 milliGRAM(s) Oral every 6 hours PRN Temp greater or equal to 38C (100.4F), Mild Pain (1 - 3)  ALPRAZolam 0.25 milliGRAM(s) Oral every 8 hours PRN for anxiety/Sleep  aluminum hydroxide/magnesium hydroxide/simethicone Suspension 30 milliLiter(s) Oral once PRN Dyspepsia  dextrose Oral Gel 15 Gram(s) Oral once PRN Blood Glucose LESS THAN 70 milliGRAM(s)/deciliter  diphenhydrAMINE 25 milliGRAM(s) Oral every 6 hours PRN Rash and/or Itching  hydrALAZINE Injectable 10 milliGRAM(s) IV Push every 4 hours PRN for systolic bp > 160  melatonin 3 milliGRAM(s) Oral at bedtime PRN Insomnia  metoprolol tartrate Injectable 5 milliGRAM(s) IV Push every 6 hours PRN heart rate sustaining greater than 130  ondansetron Injectable 4 milliGRAM(s) IV Push every 8 hours PRN Nausea and/or Vomiting  oxyCODONE    IR 5 milliGRAM(s) Oral every 6 hours PRN Moderate Pain (4 - 6)      CAPILLARY BLOOD GLUCOSE      POCT Blood Glucose.: 227 mg/dL (03 Jan 2024 08:00)  POCT Blood Glucose.: 207 mg/dL (02 Jan 2024 22:01)  POCT Blood Glucose.: 171 mg/dL (02 Jan 2024 18:39)  POCT Blood Glucose.: 156 mg/dL (02 Jan 2024 17:33)    I&O's Summary    02 Jan 2024 07:01  -  03 Jan 2024 07:00  --------------------------------------------------------  IN: 120 mL / OUT: 3250 mL / NET: -3130 mL        PHYSICAL EXAM:  Vital Signs Last 24 Hrs  T(C): 36.7 (03 Jan 2024 08:05), Max: 37.1 (02 Jan 2024 16:28)  T(F): 98 (03 Jan 2024 08:05), Max: 98.7 (02 Jan 2024 16:28)  HR: 110 (03 Jan 2024 09:33) (97 - 137)  BP: 103/56 (03 Jan 2024 08:05) (96/57 - 112/64)  BP(mean): --  RR: 17 (03 Jan 2024 08:05) (17 - 20)  SpO2: 98% (03 Jan 2024 09:33) (95% - 100%)    Parameters below as of 03 Jan 2024 09:33  Patient On (Oxygen Delivery Method): nasal cannula, 3l        CONSTITUTIONAL: NAD,OBESE  EYES: PERRLA; conjunctiva and sclera clear  ENMT: Moist oral mucosa,   RESPIRATORY: Normal respiratory effort; lungs are clear to auscultation bilaterally  CARDIOVASCULAR: IRR normal S1 and S2, no murmur   EXTS: Chronic lower extremity edema; Peripheral pulses are 2+ bilaterally  ABDOMEN: Nontender to palpation, normoactive bowel sounds, no rebound/guarding;   MUSCLOSKELETAL:   no joint swelling or tenderness to palpation  PSYCH: affect appropriate  NEUROLOGY: A+O to person, place, and time; CN 2-12 are intact and symmetric; no gross sensory deficits;       LABS:                        12.1   14.53 )-----------( 124      ( 03 Jan 2024 05:21 )             37.2     01-03    139  |  96  |  74.3<H>  ----------------------------<  210<H>  4.2   |  29.0  |  1.73<H>    Ca    9.2      03 Jan 2024 05:21            Urinalysis Basic - ( 03 Jan 2024 05:21 )    Color: x / Appearance: x / SG: x / pH: x  Gluc: 210 mg/dL / Ketone: x  / Bili: x / Urobili: x   Blood: x / Protein: x / Nitrite: x   Leuk Esterase: x / RBC: x / WBC x   Sq Epi: x / Non Sq Epi: x / Bacteria: x          RADIOLOGY & ADDITIONAL TESTS:  Results Reviewed:

## 2024-01-03 NOTE — PROGRESS NOTE ADULT - SUBJECTIVE AND OBJECTIVE BOX
INTERVAL HPI/OVERNIGHT EVENTS:  follow up for diabetes management    ros: denies chest pain or sob. endorses good appetite.    MEDICATIONS  (STANDING):  acetylcysteine 20%  Inhalation 4 milliLiter(s) Inhalation daily  atorvastatin 80 milliGRAM(s) Oral at bedtime  budesonide  80 MICROgram(s)/formoterol 4.5 MICROgram(s) Inhaler 2 Puff(s) Inhalation two times a day  chlorhexidine 2% Cloths 1 Application(s) Topical <User Schedule>  clotrimazole 1% Cream 1 Application(s) Topical two times a day  dextrose 5%. 1000 milliLiter(s) (50 mL/Hr) IV Continuous <Continuous>  dextrose 5%. 1000 milliLiter(s) (100 mL/Hr) IV Continuous <Continuous>  dextrose 50% Injectable 25 Gram(s) IV Push once  dextrose 50% Injectable 12.5 Gram(s) IV Push once  dextrose 50% Injectable 25 Gram(s) IV Push once  famotidine    Tablet 20 milliGRAM(s) Oral daily  glucagon  Injectable 1 milliGRAM(s) IntraMuscular once  insulin glargine Injectable (LANTUS) 30 Unit(s) SubCutaneous at bedtime  insulin glargine Injectable (LANTUS) 30 Unit(s) SubCutaneous every morning  insulin lispro (ADMELOG) corrective regimen sliding scale   SubCutaneous three times a day before meals  insulin lispro Injectable (ADMELOG) 25 Unit(s) SubCutaneous three times a day before meals  ipratropium    for Nebulization 500 MICROGram(s) Nebulizer every 6 hours  lactulose Syrup 20 Gram(s) Oral daily  levalbuterol Inhalation 0.63 milliGRAM(s) Inhalation every 6 hours  metoprolol tartrate 75 milliGRAM(s) Oral every 8 hours  nystatin Powder 1 Application(s) Topical two times a day  oxyCODONE  ER Tablet 30 milliGRAM(s) Oral every 12 hours  polyethylene glycol 3350 17 Gram(s) Oral daily  rivaroxaban 15 milliGRAM(s) Oral daily  senna 2 Tablet(s) Oral at bedtime  torsemide 20 milliGRAM(s) Oral daily    MEDICATIONS  (PRN):  acetaminophen     Tablet .. 650 milliGRAM(s) Oral every 6 hours PRN Temp greater or equal to 38C (100.4F), Mild Pain (1 - 3)  ALPRAZolam 0.25 milliGRAM(s) Oral every 8 hours PRN for anxiety/Sleep  aluminum hydroxide/magnesium hydroxide/simethicone Suspension 30 milliLiter(s) Oral once PRN Dyspepsia  dextrose Oral Gel 15 Gram(s) Oral once PRN Blood Glucose LESS THAN 70 milliGRAM(s)/deciliter  diphenhydrAMINE 25 milliGRAM(s) Oral every 6 hours PRN Rash and/or Itching  hydrALAZINE Injectable 10 milliGRAM(s) IV Push every 4 hours PRN for systolic bp > 160  melatonin 3 milliGRAM(s) Oral at bedtime PRN Insomnia  metoprolol tartrate Injectable 5 milliGRAM(s) IV Push every 6 hours PRN heart rate sustaining greater than 130  ondansetron Injectable 4 milliGRAM(s) IV Push every 8 hours PRN Nausea and/or Vomiting  oxyCODONE    IR 5 milliGRAM(s) Oral every 6 hours PRN Moderate Pain (4 - 6)      Allergies  wool- rash, itch (Other)  adhesives (Rash)  latex (Rash)  Bactrim (Flushing)      Vital Signs Last 24 Hrs  T(C): 36.7 (03 Jan 2024 08:05), Max: 37.1 (02 Jan 2024 16:28)  T(F): 98 (03 Jan 2024 08:05), Max: 98.7 (02 Jan 2024 16:28)  HR: 110 (03 Jan 2024 09:33) (97 - 137)  BP: 103/56 (03 Jan 2024 08:05) (97/52 - 112/64)  BP(mean): --  RR: 17 (03 Jan 2024 08:05) (17 - 20)  SpO2: 98% (03 Jan 2024 09:33) (95% - 100%)    Parameters below as of 03 Jan 2024 09:33  Patient On (Oxygen Delivery Method): nasal cannula, 3l        PHYSICAL EXAM:  General: No apparent distress, obese  Neck: Supple, trachea midline  Respiratory: Lungs clear bilaterally, normal rate, effort  Cardiac: +S1, S2, no m/r/g  GI: +BS, soft, non tender, non distended  Extremities: Bilateral LE chronic skin changes  Neuro: A+O X3, no tremor        LABS:                        12.1   14.53 )-----------( 124      ( 03 Jan 2024 05:21 )             37.2     01-03    139  |  96  |  74.3<H>  ----------------------------<  210<H>  4.2   |  29.0  |  1.73<H>    Ca    9.2      03 Jan 2024 05:21      Urinalysis Basic - ( 03 Jan 2024 05:21 )    Color: x / Appearance: x / SG: x / pH: x  Gluc: 210 mg/dL / Ketone: x  / Bili: x / Urobili: x   Blood: x / Protein: x / Nitrite: x   Leuk Esterase: x / RBC: x / WBC x   Sq Epi: x / Non Sq Epi: x / Bacteria: x          POCT Blood Glucose.: 227 mg/dL (01-03-24 @ 08:00)  POCT Blood Glucose.: 207 mg/dL (01-02-24 @ 22:01)  POCT Blood Glucose.: 171 mg/dL (01-02-24 @ 18:39)  POCT Blood Glucose.: 156 mg/dL (01-02-24 @ 17:33)        Thyroid Stimulating Hormone, Serum: 1.42 uIU/mL (12-29-23 @ 05:52)  Free Thyroxine, Serum: 1.6 ng/dL (12-29-23 @ 05:52)  Thyroid Stimulating Hormone, Serum: 0.34 uIU/mL (12-22-23 @ 04:51)  Thyroid Stimulating Hormone, Serum: 1.51 uIU/mL (12-16-23 @ 15:20)  Thyroid Stimulating Hormone, Serum: 3.02 uIU/mL (12-16-23 @ 10:18)

## 2024-01-03 NOTE — PROGRESS NOTE ADULT - ASSESSMENT
67 yo female with HTN, HLD, DM2, HFpEF, CKD III, DVT, Uterine CA, COPD on home o2 who presented with complaints of shortness of breath. Patient reports that she has been sick all week and was recently started on Vantin and steroids while at the nursing home. Pt admitted for acute on chronic respiratory failure sec to Acute HFpEF exacerbation with likely with Superimposed bacterial Pneumonia in setting of RSV, Severe pulm HTN, new Afib..CT scan which also showed pneumonia and pulmonary HTN. S/P LHC 12/29 with mild LAD disease,pulm htn. Pt was wean off from BIPAP,now on NC. and was given IV lasix,Started on heparin drip and changed to Xarelto after cath.    New afib RVR  - hr high  - increase bb 75 mg q8hr  - Xarelto 15 mg qd per renal function  - EP consulted, no DCCV given resp issues  - F/U EP rec  - TTE W or WO Ultrasound Enhancing Agent (12.18.23)EF 60 to 65%. There is moderate (grade 2) left ventricular diastolic dysfunction, with normal filling pressure.      # Acute on chronic respiratory failure  # Acute HFpEF exacerbation   # Superimposed bacterial Pneumonia in setting of RSV  # Severe pulm HTN  - so2 4 L.home 4 L NC  - CT chest reads worsening middle lobe PNA  - S/p LHC mild LAD disease/ Severe pulmonary HTN 70 / LVEDP stable  - sputum clx ordered for last several days, pt unable to bring up sputum  - repeat MRSA PCR negative  - ID consulted, WBC likely reactive to steroids  - antibiotics stopped on 12/28, WBC trended down  - pt high risk for recurrent aspiration  - repeat blood clx NGTD  - S/P  steroids   - levalbuterol and ipratropium q6hrs, symbicort BID   - Torsemide  - BIPAP qhs  - f/u pulm rec  -F/U Cardio rec      # JACQUES  - likely combination of pre-renal and ATN  - lasix on hold  -cr 1.8  - nephrology consulted  - trend BMP    # previous suspicion for PE/ DVT on admission  - Patient has empirically been treated for PE/DVT since August  - No scans noted to have PE/DVT  - duplex lower ext to without dvt   - d dimer negative        #Chronic pain  - continue home Pain meds of Oxycodone 60 mg q12h and Oxycodone 5mg q6h PRN  -cut down oxycodone 30 mg bid as bp soft    # HLD  - Atorvastatin for rosuvastatin    # COPD  -oxygen supplement  - taper off  steroids   - Symbicort, Spiriva, albuterol  - pulm following     #DM  #steroid induced hyperglycemia  -episode of hypoglycemia   -A1C 8.3  - cut down lantus 30 u bid  -cut down 25 u meal coverage  -lispro sliding scale  -f/u endocrine rec    DVT prophylaxis: Xarelto   Dispo: long term care   DANE Pt- understood risk of hypotension from narcotic.pt is adamant to get her pain meds.   dane rn

## 2024-01-03 NOTE — PROGRESS NOTE ADULT - NS ATTEND AMEND GEN_ALL_CORE FT
plan discussed with NP and changes incorporated in the note.    agree with the plan above  still a bit hyperglycemic, will see if adjusting premeal insulin will help

## 2024-01-04 LAB
ANION GAP SERPL CALC-SCNC: 12 MMOL/L — SIGNIFICANT CHANGE UP (ref 5–17)
ANION GAP SERPL CALC-SCNC: 12 MMOL/L — SIGNIFICANT CHANGE UP (ref 5–17)
BUN SERPL-MCNC: 74.7 MG/DL — HIGH (ref 8–20)
BUN SERPL-MCNC: 74.7 MG/DL — HIGH (ref 8–20)
CALCIUM SERPL-MCNC: 9.1 MG/DL — SIGNIFICANT CHANGE UP (ref 8.4–10.5)
CALCIUM SERPL-MCNC: 9.1 MG/DL — SIGNIFICANT CHANGE UP (ref 8.4–10.5)
CHLORIDE SERPL-SCNC: 95 MMOL/L — LOW (ref 96–108)
CHLORIDE SERPL-SCNC: 95 MMOL/L — LOW (ref 96–108)
CO2 SERPL-SCNC: 31 MMOL/L — HIGH (ref 22–29)
CO2 SERPL-SCNC: 31 MMOL/L — HIGH (ref 22–29)
CREAT SERPL-MCNC: 1.61 MG/DL — HIGH (ref 0.5–1.3)
CREAT SERPL-MCNC: 1.61 MG/DL — HIGH (ref 0.5–1.3)
EGFR: 34 ML/MIN/1.73M2 — LOW
EGFR: 34 ML/MIN/1.73M2 — LOW
GLUCOSE BLDC GLUCOMTR-MCNC: 165 MG/DL — HIGH (ref 70–99)
GLUCOSE BLDC GLUCOMTR-MCNC: 165 MG/DL — HIGH (ref 70–99)
GLUCOSE BLDC GLUCOMTR-MCNC: 183 MG/DL — HIGH (ref 70–99)
GLUCOSE BLDC GLUCOMTR-MCNC: 183 MG/DL — HIGH (ref 70–99)
GLUCOSE BLDC GLUCOMTR-MCNC: 217 MG/DL — HIGH (ref 70–99)
GLUCOSE BLDC GLUCOMTR-MCNC: 217 MG/DL — HIGH (ref 70–99)
GLUCOSE BLDC GLUCOMTR-MCNC: 231 MG/DL — HIGH (ref 70–99)
GLUCOSE BLDC GLUCOMTR-MCNC: 231 MG/DL — HIGH (ref 70–99)
GLUCOSE BLDC GLUCOMTR-MCNC: 262 MG/DL — HIGH (ref 70–99)
GLUCOSE BLDC GLUCOMTR-MCNC: 262 MG/DL — HIGH (ref 70–99)
GLUCOSE BLDC GLUCOMTR-MCNC: 312 MG/DL — HIGH (ref 70–99)
GLUCOSE BLDC GLUCOMTR-MCNC: 312 MG/DL — HIGH (ref 70–99)
GLUCOSE BLDC GLUCOMTR-MCNC: 370 MG/DL — HIGH (ref 70–99)
GLUCOSE BLDC GLUCOMTR-MCNC: 370 MG/DL — HIGH (ref 70–99)
GLUCOSE SERPL-MCNC: 273 MG/DL — HIGH (ref 70–99)
GLUCOSE SERPL-MCNC: 273 MG/DL — HIGH (ref 70–99)
HCT VFR BLD CALC: 35.7 % — SIGNIFICANT CHANGE UP (ref 34.5–45)
HCT VFR BLD CALC: 35.7 % — SIGNIFICANT CHANGE UP (ref 34.5–45)
HGB BLD-MCNC: 11.9 G/DL — SIGNIFICANT CHANGE UP (ref 11.5–15.5)
HGB BLD-MCNC: 11.9 G/DL — SIGNIFICANT CHANGE UP (ref 11.5–15.5)
MCHC RBC-ENTMCNC: 30.7 PG — SIGNIFICANT CHANGE UP (ref 27–34)
MCHC RBC-ENTMCNC: 30.7 PG — SIGNIFICANT CHANGE UP (ref 27–34)
MCHC RBC-ENTMCNC: 33.3 GM/DL — SIGNIFICANT CHANGE UP (ref 32–36)
MCHC RBC-ENTMCNC: 33.3 GM/DL — SIGNIFICANT CHANGE UP (ref 32–36)
MCV RBC AUTO: 92 FL — SIGNIFICANT CHANGE UP (ref 80–100)
MCV RBC AUTO: 92 FL — SIGNIFICANT CHANGE UP (ref 80–100)
PLATELET # BLD AUTO: 130 K/UL — LOW (ref 150–400)
PLATELET # BLD AUTO: 130 K/UL — LOW (ref 150–400)
POTASSIUM SERPL-MCNC: 3.7 MMOL/L — SIGNIFICANT CHANGE UP (ref 3.5–5.3)
POTASSIUM SERPL-MCNC: 3.7 MMOL/L — SIGNIFICANT CHANGE UP (ref 3.5–5.3)
POTASSIUM SERPL-SCNC: 3.7 MMOL/L — SIGNIFICANT CHANGE UP (ref 3.5–5.3)
POTASSIUM SERPL-SCNC: 3.7 MMOL/L — SIGNIFICANT CHANGE UP (ref 3.5–5.3)
RBC # BLD: 3.88 M/UL — SIGNIFICANT CHANGE UP (ref 3.8–5.2)
RBC # BLD: 3.88 M/UL — SIGNIFICANT CHANGE UP (ref 3.8–5.2)
RBC # FLD: 14.4 % — SIGNIFICANT CHANGE UP (ref 10.3–14.5)
RBC # FLD: 14.4 % — SIGNIFICANT CHANGE UP (ref 10.3–14.5)
SODIUM SERPL-SCNC: 138 MMOL/L — SIGNIFICANT CHANGE UP (ref 135–145)
SODIUM SERPL-SCNC: 138 MMOL/L — SIGNIFICANT CHANGE UP (ref 135–145)
WBC # BLD: 11.78 K/UL — HIGH (ref 3.8–10.5)
WBC # BLD: 11.78 K/UL — HIGH (ref 3.8–10.5)
WBC # FLD AUTO: 11.78 K/UL — HIGH (ref 3.8–10.5)
WBC # FLD AUTO: 11.78 K/UL — HIGH (ref 3.8–10.5)

## 2024-01-04 PROCEDURE — 99232 SBSQ HOSP IP/OBS MODERATE 35: CPT

## 2024-01-04 RX ORDER — INSULIN GLARGINE 100 [IU]/ML
34 INJECTION, SOLUTION SUBCUTANEOUS EVERY MORNING
Refills: 0 | Status: DISCONTINUED | OUTPATIENT
Start: 2024-01-05 | End: 2024-01-08

## 2024-01-04 RX ORDER — INSULIN GLARGINE 100 [IU]/ML
34 INJECTION, SOLUTION SUBCUTANEOUS AT BEDTIME
Refills: 0 | Status: DISCONTINUED | OUTPATIENT
Start: 2024-01-04 | End: 2024-01-08

## 2024-01-04 RX ORDER — INSULIN LISPRO 100/ML
32 VIAL (ML) SUBCUTANEOUS
Refills: 0 | Status: DISCONTINUED | OUTPATIENT
Start: 2024-01-04 | End: 2024-01-05

## 2024-01-04 RX ADMIN — ONDANSETRON 4 MILLIGRAM(S): 8 TABLET, FILM COATED ORAL at 10:20

## 2024-01-04 RX ADMIN — LACTULOSE 20 GRAM(S): 10 SOLUTION ORAL at 12:16

## 2024-01-04 RX ADMIN — LEVALBUTEROL 0.63 MILLIGRAM(S): 1.25 SOLUTION, CONCENTRATE RESPIRATORY (INHALATION) at 04:02

## 2024-01-04 RX ADMIN — Medication 28 UNIT(S): at 08:36

## 2024-01-04 RX ADMIN — Medication 32 UNIT(S): at 12:15

## 2024-01-04 RX ADMIN — Medication 75 MILLIGRAM(S): at 13:05

## 2024-01-04 RX ADMIN — Medication 4 MILLILITER(S): at 08:10

## 2024-01-04 RX ADMIN — BUDESONIDE AND FORMOTEROL FUMARATE DIHYDRATE 2 PUFF(S): 160; 4.5 AEROSOL RESPIRATORY (INHALATION) at 08:25

## 2024-01-04 RX ADMIN — Medication 500 MICROGRAM(S): at 14:20

## 2024-01-04 RX ADMIN — POLYETHYLENE GLYCOL 3350 17 GRAM(S): 17 POWDER, FOR SOLUTION ORAL at 12:15

## 2024-01-04 RX ADMIN — NYSTATIN CREAM 1 APPLICATION(S): 100000 CREAM TOPICAL at 17:11

## 2024-01-04 RX ADMIN — INSULIN GLARGINE 30 UNIT(S): 100 INJECTION, SOLUTION SUBCUTANEOUS at 08:36

## 2024-01-04 RX ADMIN — SENNA PLUS 2 TABLET(S): 8.6 TABLET ORAL at 23:06

## 2024-01-04 RX ADMIN — RIVAROXABAN 15 MILLIGRAM(S): KIT at 12:16

## 2024-01-04 RX ADMIN — OXYCODONE HYDROCHLORIDE 30 MILLIGRAM(S): 5 TABLET ORAL at 05:31

## 2024-01-04 RX ADMIN — Medication 500 MICROGRAM(S): at 04:02

## 2024-01-04 RX ADMIN — LEVALBUTEROL 0.63 MILLIGRAM(S): 1.25 SOLUTION, CONCENTRATE RESPIRATORY (INHALATION) at 14:20

## 2024-01-04 RX ADMIN — Medication 10: at 12:14

## 2024-01-04 RX ADMIN — Medication 500 MICROGRAM(S): at 08:10

## 2024-01-04 RX ADMIN — LEVALBUTEROL 0.63 MILLIGRAM(S): 1.25 SOLUTION, CONCENTRATE RESPIRATORY (INHALATION) at 08:10

## 2024-01-04 RX ADMIN — Medication 20 MILLIGRAM(S): at 05:30

## 2024-01-04 RX ADMIN — OXYCODONE HYDROCHLORIDE 30 MILLIGRAM(S): 5 TABLET ORAL at 17:09

## 2024-01-04 RX ADMIN — OXYCODONE HYDROCHLORIDE 5 MILLIGRAM(S): 5 TABLET ORAL at 00:01

## 2024-01-04 RX ADMIN — Medication 0.25 MILLIGRAM(S): at 12:17

## 2024-01-04 RX ADMIN — Medication 6: at 08:35

## 2024-01-04 RX ADMIN — NYSTATIN CREAM 1 APPLICATION(S): 100000 CREAM TOPICAL at 05:34

## 2024-01-04 RX ADMIN — Medication 32 UNIT(S): at 17:10

## 2024-01-04 RX ADMIN — ATORVASTATIN CALCIUM 80 MILLIGRAM(S): 80 TABLET, FILM COATED ORAL at 23:05

## 2024-01-04 RX ADMIN — FAMOTIDINE 20 MILLIGRAM(S): 10 INJECTION INTRAVENOUS at 12:16

## 2024-01-04 RX ADMIN — Medication 1 APPLICATION(S): at 05:33

## 2024-01-04 RX ADMIN — OXYCODONE HYDROCHLORIDE 5 MILLIGRAM(S): 5 TABLET ORAL at 23:05

## 2024-01-04 RX ADMIN — Medication 75 MILLIGRAM(S): at 05:31

## 2024-01-04 RX ADMIN — INSULIN GLARGINE 34 UNIT(S): 100 INJECTION, SOLUTION SUBCUTANEOUS at 23:06

## 2024-01-04 RX ADMIN — Medication 1 APPLICATION(S): at 17:11

## 2024-01-04 RX ADMIN — Medication 2: at 17:10

## 2024-01-04 RX ADMIN — CHLORHEXIDINE GLUCONATE 1 APPLICATION(S): 213 SOLUTION TOPICAL at 05:37

## 2024-01-04 NOTE — PROGRESS NOTE ADULT - PROBLEM SELECTOR PROBLEM 4
RSV infection
Pulmonary hypertension
RSV infection
Pulmonary hypertension
RSV infection
Respiratory failure with hypercapnia
RSV infection
RSV infection

## 2024-01-04 NOTE — PROGRESS NOTE ADULT - SUBJECTIVE AND OBJECTIVE BOX
Patient is a 68y old  Female who presents with a chief complaint of SOB (04 Jan 2024 08:48)      no acute change  chronic back pain  sob stable with 4 l nc  REVIEW OF SYSTEMS: All systems are reviewed and found to be negative except above    MEDICATIONS  (STANDING):  acetylcysteine 20%  Inhalation 4 milliLiter(s) Inhalation daily  atorvastatin 80 milliGRAM(s) Oral at bedtime  budesonide  80 MICROgram(s)/formoterol 4.5 MICROgram(s) Inhaler 2 Puff(s) Inhalation two times a day  chlorhexidine 2% Cloths 1 Application(s) Topical <User Schedule>  clotrimazole 1% Cream 1 Application(s) Topical two times a day  dextrose 5%. 1000 milliLiter(s) (50 mL/Hr) IV Continuous <Continuous>  dextrose 5%. 1000 milliLiter(s) (100 mL/Hr) IV Continuous <Continuous>  dextrose 50% Injectable 25 Gram(s) IV Push once  dextrose 50% Injectable 12.5 Gram(s) IV Push once  dextrose 50% Injectable 25 Gram(s) IV Push once  famotidine    Tablet 20 milliGRAM(s) Oral daily  glucagon  Injectable 1 milliGRAM(s) IntraMuscular once  insulin glargine Injectable (LANTUS) 34 Unit(s) SubCutaneous at bedtime  insulin lispro (ADMELOG) corrective regimen sliding scale   SubCutaneous three times a day before meals  insulin lispro Injectable (ADMELOG) 32 Unit(s) SubCutaneous three times a day before meals  ipratropium    for Nebulization 500 MICROGram(s) Nebulizer every 6 hours  lactulose Syrup 20 Gram(s) Oral daily  levalbuterol Inhalation 0.63 milliGRAM(s) Inhalation every 6 hours  metoprolol tartrate 75 milliGRAM(s) Oral every 8 hours  nystatin Powder 1 Application(s) Topical two times a day  oxyCODONE  ER Tablet 30 milliGRAM(s) Oral every 12 hours  polyethylene glycol 3350 17 Gram(s) Oral daily  rivaroxaban 15 milliGRAM(s) Oral daily  senna 2 Tablet(s) Oral at bedtime  torsemide 20 milliGRAM(s) Oral daily    MEDICATIONS  (PRN):  acetaminophen     Tablet .. 650 milliGRAM(s) Oral every 6 hours PRN Temp greater or equal to 38C (100.4F), Mild Pain (1 - 3)  ALPRAZolam 0.25 milliGRAM(s) Oral every 8 hours PRN for anxiety/Sleep  aluminum hydroxide/magnesium hydroxide/simethicone Suspension 30 milliLiter(s) Oral once PRN Dyspepsia  bisacodyl Suppository 10 milliGRAM(s) Rectal daily PRN Constipation  dextrose Oral Gel 15 Gram(s) Oral once PRN Blood Glucose LESS THAN 70 milliGRAM(s)/deciliter  diphenhydrAMINE 25 milliGRAM(s) Oral every 6 hours PRN Rash and/or Itching  hydrALAZINE Injectable 10 milliGRAM(s) IV Push every 4 hours PRN for systolic bp > 160  melatonin 3 milliGRAM(s) Oral at bedtime PRN Insomnia  metoprolol tartrate Injectable 5 milliGRAM(s) IV Push every 6 hours PRN heart rate sustaining greater than 130  ondansetron Injectable 4 milliGRAM(s) IV Push every 8 hours PRN Nausea and/or Vomiting  oxyCODONE    IR 5 milliGRAM(s) Oral every 6 hours PRN Moderate Pain (4 - 6)      CAPILLARY BLOOD GLUCOSE      POCT Blood Glucose.: 370 mg/dL (04 Jan 2024 12:04)  POCT Blood Glucose.: 262 mg/dL (04 Jan 2024 08:00)  POCT Blood Glucose.: 312 mg/dL (04 Jan 2024 05:57)  POCT Blood Glucose.: 231 mg/dL (04 Jan 2024 00:02)  POCT Blood Glucose.: 181 mg/dL (03 Jan 2024 22:31)  POCT Blood Glucose.: 208 mg/dL (03 Jan 2024 17:40)    I&O's Summary    03 Jan 2024 07:01  -  04 Jan 2024 07:00  --------------------------------------------------------  IN: 970 mL / OUT: 3550 mL / NET: -2580 mL    04 Jan 2024 07:01  -  04 Jan 2024 14:41  --------------------------------------------------------  IN: 0 mL / OUT: 950 mL / NET: -950 mL        PHYSICAL EXAM:  Vital Signs Last 24 Hrs  T(C): 36.7 (04 Jan 2024 07:56), Max: 36.8 (03 Jan 2024 17:50)  T(F): 98 (04 Jan 2024 07:56), Max: 98.2 (03 Jan 2024 17:50)  HR: 108 (04 Jan 2024 12:20) (97 - 115)  BP: 116/64 (04 Jan 2024 12:20) (98/58 - 116/64)  BP(mean): 71 (03 Jan 2024 17:50) (71 - 71)  RR: 17 (04 Jan 2024 12:20) (17 - 19)  SpO2: 99% (04 Jan 2024 12:20) (93% - 100%)    Parameters below as of 04 Jan 2024 12:20  Patient On (Oxygen Delivery Method): nasal cannula  O2 Flow (L/min): 4      CONSTITUTIONAL: NAD,obese  EYES: PERRLA; conjunctiva and sclera clear  ENMT: Moist oral mucosa,   RESPIRATORY: Normal respiratory effort; mild crackles to auscultation bilaterally  CARDIOVASCULAR: Regular rate and rhythm, normal S1 and S2, no murmur   EXTS:Peripheral pulses are 2+ bilaterally  ABDOMEN: Nontender to palpation, normoactive bowel sounds, no rebound/guarding;   MUSCLOSKELETAL:  no joint swelling or tenderness to palpation  PSYCH: calm  NEUROLOGY: A+O to person, place, and time; CN 2-12 are intact and symmetric; no gross sensory deficits;       LABS:                        11.9   11.78 )-----------( 130      ( 04 Jan 2024 05:31 )             35.7     01-04    138  |  95<L>  |  74.7<H>  ----------------------------<  273<H>  3.7   |  31.0<H>  |  1.61<H>    Ca    9.1      04 Jan 2024 05:31            Urinalysis Basic - ( 04 Jan 2024 05:31 )    Color: x / Appearance: x / SG: x / pH: x  Gluc: 273 mg/dL / Ketone: x  / Bili: x / Urobili: x   Blood: x / Protein: x / Nitrite: x   Leuk Esterase: x / RBC: x / WBC x   Sq Epi: x / Non Sq Epi: x / Bacteria: x          RADIOLOGY & ADDITIONAL TESTS:  Results Reviewed:

## 2024-01-04 NOTE — PROGRESS NOTE ADULT - SUBJECTIVE AND OBJECTIVE BOX
INTERVAL HPI/OVERNIGHT EVENTS:  follow up for diabetes    ros: Denies chest pain or sob. Endorses good appetite.    MEDICATIONS  (STANDING):  acetylcysteine 20%  Inhalation 4 milliLiter(s) Inhalation daily  atorvastatin 80 milliGRAM(s) Oral at bedtime  budesonide  80 MICROgram(s)/formoterol 4.5 MICROgram(s) Inhaler 2 Puff(s) Inhalation two times a day  chlorhexidine 2% Cloths 1 Application(s) Topical <User Schedule>  clotrimazole 1% Cream 1 Application(s) Topical two times a day  dextrose 5%. 1000 milliLiter(s) (100 mL/Hr) IV Continuous <Continuous>  dextrose 5%. 1000 milliLiter(s) (50 mL/Hr) IV Continuous <Continuous>  dextrose 50% Injectable 25 Gram(s) IV Push once  dextrose 50% Injectable 12.5 Gram(s) IV Push once  dextrose 50% Injectable 25 Gram(s) IV Push once  famotidine    Tablet 20 milliGRAM(s) Oral daily  glucagon  Injectable 1 milliGRAM(s) IntraMuscular once  insulin glargine Injectable (LANTUS) 30 Unit(s) SubCutaneous at bedtime  insulin glargine Injectable (LANTUS) 30 Unit(s) SubCutaneous every morning  insulin lispro (ADMELOG) corrective regimen sliding scale   SubCutaneous three times a day before meals  insulin lispro Injectable (ADMELOG) 28 Unit(s) SubCutaneous three times a day before meals  ipratropium    for Nebulization 500 MICROGram(s) Nebulizer every 6 hours  lactulose Syrup 20 Gram(s) Oral daily  levalbuterol Inhalation 0.63 milliGRAM(s) Inhalation every 6 hours  metoprolol tartrate 75 milliGRAM(s) Oral every 8 hours  nystatin Powder 1 Application(s) Topical two times a day  oxyCODONE  ER Tablet 30 milliGRAM(s) Oral every 12 hours  polyethylene glycol 3350 17 Gram(s) Oral daily  rivaroxaban 15 milliGRAM(s) Oral daily  senna 2 Tablet(s) Oral at bedtime  torsemide 20 milliGRAM(s) Oral daily    MEDICATIONS  (PRN):  acetaminophen     Tablet .. 650 milliGRAM(s) Oral every 6 hours PRN Temp greater or equal to 38C (100.4F), Mild Pain (1 - 3)  ALPRAZolam 0.25 milliGRAM(s) Oral every 8 hours PRN for anxiety/Sleep  aluminum hydroxide/magnesium hydroxide/simethicone Suspension 30 milliLiter(s) Oral once PRN Dyspepsia  dextrose Oral Gel 15 Gram(s) Oral once PRN Blood Glucose LESS THAN 70 milliGRAM(s)/deciliter  diphenhydrAMINE 25 milliGRAM(s) Oral every 6 hours PRN Rash and/or Itching  hydrALAZINE Injectable 10 milliGRAM(s) IV Push every 4 hours PRN for systolic bp > 160  melatonin 3 milliGRAM(s) Oral at bedtime PRN Insomnia  metoprolol tartrate Injectable 5 milliGRAM(s) IV Push every 6 hours PRN heart rate sustaining greater than 130  ondansetron Injectable 4 milliGRAM(s) IV Push every 8 hours PRN Nausea and/or Vomiting  oxyCODONE    IR 5 milliGRAM(s) Oral every 6 hours PRN Moderate Pain (4 - 6)      Allergies  wool- rash, itch (Other)  adhesives (Rash)  latex (Rash)  Bactrim (Flushing)      Vital Signs Last 24 Hrs  T(C): 36.7 (04 Jan 2024 07:56), Max: 36.8 (03 Jan 2024 17:50)  T(F): 98 (04 Jan 2024 07:56), Max: 98.2 (03 Jan 2024 17:50)  HR: 112 (04 Jan 2024 08:10) (90 - 125)  BP: 99/56 (04 Jan 2024 07:56) (98/58 - 115/64)  BP(mean): 71 (03 Jan 2024 17:50) (71 - 78)  RR: 19 (04 Jan 2024 07:56) (18 - 19)  SpO2: 96% (04 Jan 2024 08:10) (93% - 100%)    Parameters below as of 04 Jan 2024 08:10  Patient On (Oxygen Delivery Method): nasal cannula, NC-3L        PHYSICAL EXAM:  General: No apparent distress, obese  Neck: Supple, trachea midline  Respiratory: Lungs clear bilaterally, normal rate, effort  Cardiac: +S1, S2, no m/r/g  GI: +BS, soft, non tender, non distended  Extremities: Bilateral LE chronic skin changes  Neuro: A+O X3, no tremor      LABS:                        11.9   11.78 )-----------( 130      ( 04 Jan 2024 05:31 )             35.7     01-04    138  |  95<L>  |  74.7<H>  ----------------------------<  273<H>  3.7   |  31.0<H>  |  1.61<H>    Ca    9.1      04 Jan 2024 05:31      Urinalysis Basic - ( 04 Jan 2024 05:31 )    Color: x / Appearance: x / SG: x / pH: x  Gluc: 273 mg/dL / Ketone: x  / Bili: x / Urobili: x   Blood: x / Protein: x / Nitrite: x   Leuk Esterase: x / RBC: x / WBC x   Sq Epi: x / Non Sq Epi: x / Bacteria: x          POCT Blood Glucose.: 262 mg/dL (01-04-24 @ 08:00)  POCT Blood Glucose.: 312 mg/dL (01-04-24 @ 05:57)  POCT Blood Glucose.: 231 mg/dL (01-04-24 @ 00:02)  POCT Blood Glucose.: 181 mg/dL (01-03-24 @ 22:31)  POCT Blood Glucose.: 208 mg/dL (01-03-24 @ 17:40)  POCT Blood Glucose.: 221 mg/dL (01-03-24 @ 12:22)        Thyroid Stimulating Hormone, Serum: 1.42 uIU/mL (12-29-23 @ 05:52)  Free Thyroxine, Serum: 1.6 ng/dL (12-29-23 @ 05:52)  Thyroid Stimulating Hormone, Serum: 0.34 uIU/mL (12-22-23 @ 04:51)  Thyroid Stimulating Hormone, Serum: 1.51 uIU/mL (12-16-23 @ 15:20)  Thyroid Stimulating Hormone, Serum: 3.02 uIU/mL (12-16-23 @ 10:18)

## 2024-01-04 NOTE — PROGRESS NOTE ADULT - PROBLEM SELECTOR PROBLEM 1
Respiratory failure with hypercapnia
Acute on chronic heart failure with preserved ejection fraction (HFpEF)
Respiratory failure with hypercapnia
Respiratory failure with hypercapnia
Acute on chronic heart failure with preserved ejection fraction (HFpEF)

## 2024-01-04 NOTE — PROGRESS NOTE ADULT - ASSESSMENT
67y/o female    1- acute on chronic hypoxic resp  failure - improved   2- +  RSV  on 12/16/2023 COPD - improved   3- RUL pneumonia ? 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   5- new afib  HF pEF  6-   cath 12/29/23  mild LAD   severe pulm htn   PASP 70 mmg Hg   LVEDP stable  ECHO  grade 2 diastolic dysfunctions   7- CKD 3  8- sleep disordered breathing -  tolerating pm cpap       -    po steroid   taper - completed , respiratory close to baseline   - incentive spirometry  to be   used     -s/p antibiotics  - incentive spirometry   - 4l/min     oxygen nc   titrate down oxygen as tolerated - improved with  diuresis   - c/w xarelto   - afib control per primary   - continue xopenex , and please use it as outpatient as well   - PPI  - PT, OOB to chair, aspiration precaution, chest PT  - will need to follow up with pulmonary outpatient , tentative appt set up with Dr. Long, 1/30/2024 at 10:30 am  pulmonary office @ [address] [place] [address], phone number  [phone]  - pulmonary will sign off, please call for any question    67y/o female    1- acute on chronic hypoxic resp  failure - improved   2- +  RSV  on 12/16/2023 COPD - improved   3- RUL pneumonia ? 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   5- new afib  HF pEF  6-   cath 12/29/23  mild LAD   severe pulm htn   PASP 70 mmg Hg   LVEDP stable  ECHO  grade 2 diastolic dysfunctions   7- CKD 3  8- sleep disordered breathing -  tolerating pm cpap       -    po steroid   taper - completed , respiratory close to baseline   - incentive spirometry  to be   used     -s/p antibiotics  - incentive spirometry   - 4l/min     oxygen nc   titrate down oxygen as tolerated - improved with  diuresis   - c/w xarelto   - afib control per primary   - continue xopenex , and please use it as outpatient as well   - PPI  - PT, OOB to chair, aspiration precaution, chest PT  - will need to follow up with pulmonary outpatient , tentative appt set up with Dr. Long, 1/30/2024 at 10:30 am  pulmonary office @ [address] [place] [address], phone number  [phone]  - pulmonary will sign off, please call for any question

## 2024-01-04 NOTE — PROGRESS NOTE ADULT - ASSESSMENT
67 yo female, morbidly obese, with HTN, HLD, DM2, HFpEF, CKD III, DVT, Uterine CA, COPD on home o2 who presented with complaints of shortness of breath. Admitted with acute on chronic resp failure and acute HFpEF exacerbation with likely with Superimposed bacterial Pneumonia in setting of RSV and new onset AFib and worsening GFR. S/p [facility] with mild LAD disease, Severe pulmonary HTN, LVEDP stable      INCOMPLETE     1. T2DM complicated by CKD- BG elevated  - Increase premeal admelog to ** units TID with meals  - Continue lantus ** units BID  - Sliding scale coverage    2. HLD  - Continue statin    3. Right leg pain  - US obtained without evidence of stenosis/thrombus  - No intervention as per vascular   67 yo female, morbidly obese, with HTN, HLD, DM2, HFpEF, CKD III, DVT, Uterine CA, COPD on home o2 who presented with complaints of shortness of breath. Admitted with acute on chronic resp failure and acute HFpEF exacerbation with likely with Superimposed bacterial Pneumonia in setting of RSV and new onset AFib and worsening GFR. S/p [facility] with mild LAD disease, Severe pulmonary HTN, LVEDP stable      INCOMPLETE     1. T2DM complicated by CKD- BG elevated  - Increase premeal admelog to ** units TID with meals  - Continue lantus ** units BID  - Sliding scale coverage    2. HLD  - Continue statin    3. Right leg pain  - US obtained without evidence of stenosis/thrombus  - No intervention as per vascular   67 yo female, morbidly obese, with HTN, HLD, DM2, HFpEF, CKD III, DVT, Uterine CA, COPD on home o2 who presented with complaints of shortness of breath. Admitted with acute on chronic resp failure and acute HFpEF exacerbation with likely with Superimposed bacterial Pneumonia in setting of RSV and new onset AFib and worsening GFR. S/p [facility] with mild LAD disease, Severe pulmonary HTN, LVEDP stable      1. T2DM complicated by CKD- BG elevated  - Increase premeal Admelog to 32 units TID with meals  - Increase Lantus 34 units BID  - Sliding scale coverage    2. HLD  - Continue statin    3. Right leg pain  - US obtained without evidence of stenosis/thrombus  - No intervention as per vascular   69 yo female, morbidly obese, with HTN, HLD, DM2, HFpEF, CKD III, DVT, Uterine CA, COPD on home o2 who presented with complaints of shortness of breath. Admitted with acute on chronic resp failure and acute HFpEF exacerbation with likely with Superimposed bacterial Pneumonia in setting of RSV and new onset AFib and worsening GFR. S/p [facility] with mild LAD disease, Severe pulmonary HTN, LVEDP stable      1. T2DM complicated by CKD- BG elevated  - Increase premeal Admelog to 32 units TID with meals  - Increase Lantus 34 units BID  - Sliding scale coverage    2. HLD  - Continue statin    3. Right leg pain  - US obtained without evidence of stenosis/thrombus  - No intervention as per vascular

## 2024-01-04 NOTE — PROGRESS NOTE ADULT - PROBLEM/PLAN-3
----- Message from Janeen Garcia sent at 6/3/2019  7:58 AM CDT -----  Regarding: urine order  Patient presented in lab on 6/3/19.  No orders found.  Need orders.  Patient states she nonfasting.  Please notify the lab at ext [phone] when orders are placed.  Pateint requesting Urine culture  
DISPLAY PLAN FREE TEXT

## 2024-01-04 NOTE — PROGRESS NOTE ADULT - ASSESSMENT
67 yo female with HTN, HLD, DM2, HFpEF, CKD III, DVT, Uterine CA, COPD on home o2 who presented with complaints of shortness of breath. Patient reports that she has been sick all week and was recently started on Vantin and steroids while at the nursing home. Pt admitted for acute on chronic respiratory failure sec to Acute HFpEF exacerbation with likely with Superimposed bacterial Pneumonia in setting of RSV, Severe pulm HTN, new Afib..CT scan which also showed pneumonia and pulmonary HTN. S/P C 12/29 with mild LAD disease,pulm htn. Pt was wean off from BIPAP,now on NC. and was given IV lasix,Started on heparin drip and changed to Xarelto after cath.    New afib RVR  - monitor hr  -  bb 75 mg q8hr(cut down from 150 mg bid for hypotension)  - Xarelto 15 mg qd per renal function  - EP consulted, no DCCV given resp issues  - F/U EP rec  - TTE W or WO Ultrasound Enhancing Agent (12.18.23)EF 60 to 65%. There is moderate (grade 2) left ventricular diastolic dysfunction, with normal filling pressure.      # Acute on chronic respiratory failure  # Acute HFpEF exacerbation   # Superimposed bacterial Pneumonia in setting of RSV  # Severe pulm HTN  - so2 4 L.home 4 L NC  - CT chest reads worsening middle lobe PNA  - S/p [facility] mild LAD disease/ Severe pulmonary HTN 70 / LVEDP stable  - sputum clx ordered for last several days, pt unable to bring up sputum  - repeat MRSA PCR negative  - ID consulted, WBC likely reactive to steroids  - antibiotics stopped on 12/28, WBC trended down  - pt high risk for recurrent aspiration  - repeat blood clx NGTD  - S/P  steroids   - levalbuterol and ipratropium q6hrs, symbicort BID   - Torsemide  - BIPAP qhs  - seen by cardiology/pulm f/u out pt        # JACQUES improve  - likely combination of pre-renal and ATN  - lasix on hold  -cr 1.6- nephrology consulted  - trend BMP    # previous suspicion for PE/ DVT on admission  - Patient has empirically been treated for PE/DVT since August  - No scans noted to have PE/DVT  - duplex lower ext to without dvt   - d dimer negative        #Chronic pain  - continue home Pain meds of Oxycodone 60 mg q12h and Oxycodone 5mg q6h PRN  -cut down oxycodone 30 mg bid as bp soft    # HLD  - Atorvastatin for rosuvastatin    # COPD  -oxygen supplement  - taper off  steroids   - Symbicort, Spiriva, albuterol  - pulm following     #DM  #steroid induced hyperglycemia  -episode of hypoglycemia   -A1C 8.3  - cut down lantus 30 u bid  -cut down 25 u meal coverage  -lispro sliding scale  -f/u endocrine rec    DVT prophylaxis: Xarelto   Dispo: long term care   DANE Pt- understood risk of hypotension from narcotic.pt is adamant to get her pain meds.   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 Patient reports that she has been sick all week and was recently started on Vantin and steroids while at the nursing home. Pt admitted for acute on chronic respiratory failure sec to Acute HFpEF exacerbation with likely with Superimposed bacterial Pneumonia in setting of RSV, Severe pulm HTN, new Afib..CT scan which also showed pneumonia and pulmonary HTN. S/P C 12/29 with mild LAD disease,pulm htn. Pt was wean off from BIPAP,now on NC. and was given IV lasix,Started on heparin drip and changed to Xarelto after cath.    New afib RVR  - monitor hr  -  bb 75 mg q8hr(cut down from 150 mg bid for hypotension)  - Xarelto 15 mg qd per renal function  - EP consulted, no DCCV given resp issues  - F/U EP rec  - TTE W or WO Ultrasound Enhancing Agent (12.18.23)EF 60 to 65%. There is moderate (grade 2) left ventricular diastolic dysfunction, with normal filling pressure.      # Acute on chronic respiratory failure  # Acute HFpEF exacerbation   # Superimposed bacterial Pneumonia in setting of RSV  # Severe pulm HTN  - so2 4 L.home 4 L NC  - CT chest reads worsening middle lobe PNA  - S/p [facility] mild LAD disease/ Severe pulmonary HTN 70 / LVEDP stable  - sputum clx ordered for last several days, pt unable to bring up sputum  - repeat MRSA PCR negative  - ID consulted, WBC likely reactive to steroids  - antibiotics stopped on 12/28, WBC trended down  - pt high risk for recurrent aspiration  - repeat blood clx NGTD  - S/P  steroids   - levalbuterol and ipratropium q6hrs, symbicort BID   - Torsemide  - BIPAP qhs  - seen by cardiology/pulm f/u out pt        # JACQUES improve  - likely combination of pre-renal and ATN  - lasix on hold  -cr 1.6- nephrology consulted  - trend BMP    # previous suspicion for PE/ DVT on admission  - Patient has empirically been treated for PE/DVT since August  - No scans noted to have PE/DVT  - duplex lower ext to without dvt   - d dimer negative        #Chronic pain  - continue home Pain meds of Oxycodone 60 mg q12h and Oxycodone 5mg q6h PRN  -cut down oxycodone 30 mg bid as bp soft    # HLD  - Atorvastatin for rosuvastatin    # COPD  -oxygen supplement  - taper off  steroids   - Symbicort, Spiriva, albuterol  - pulm following     #DM  #steroid induced hyperglycemia  -episode of hypoglycemia   -A1C 8.3  - cut down lantus 30 u bid  -cut down 25 u meal coverage  -lispro sliding scale  -f/u endocrine rec    DVT prophylaxis: Xarelto   Dispo: long term care   DANE Pt- understood risk of hypotension from narcotic.pt is adamant to get her pain meds.   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 Patient reports that she has been sick all week and was recently started on Vantin and steroids while at the nursing home. Pt admitted for acute on chronic respiratory failure sec to Acute HFpEF exacerbation with likely with Superimposed bacterial Pneumonia in setting of RSV, Severe pulm HTN, new Afib..CT scan which also showed pneumonia and pulmonary HTN. S/P C 12/29 with mild LAD disease,pulm htn. Pt was wean off from BIPAP,now on NC. and was given IV lasix,Started on heparin drip and changed to Xarelto after cath.    New afib RVR  - monitor hr  -  bb 75 mg q8hr(cut down from 150 mg bid for hypotension)  - Xarelto 15 mg qd per renal function  - EP consulted, no DCCV given resp issues  - F/U EP rec  - TTE W or WO Ultrasound Enhancing Agent (12.18.23)EF 60 to 65%. There is moderate (grade 2) left ventricular diastolic dysfunction, with normal filling pressure.      # Acute on chronic respiratory failure  # Acute HFpEF exacerbation   # Superimposed bacterial Pneumonia in setting of RSV  # Severe pulm HTN  - so2 4 L.home 4 L NC  - CT chest reads worsening middle lobe PNA  - S/p [facility] mild LAD disease/ Severe pulmonary HTN 70 / LVEDP stable  - sputum clx ordered for last several days, pt unable to bring up sputum  - repeat MRSA PCR negative  - ID consulted, WBC likely reactive to steroids  - antibiotics stopped on 12/28, WBC trended down  - pt high risk for recurrent aspiration  - repeat blood clx NGTD  - S/P  steroids   - levalbuterol and ipratropium q6hrs, symbicort BID   - Torsemide  - BIPAP qhs  - seen by cardiology/pulm f/u out pt        # JACQUES improve  - likely combination of pre-renal and ATN  - lasix on hold  -cr 1.6- nephrology consulted  - trend BMP    # previous suspicion for PE/ DVT on admission  - Patient has empirically been treated for PE/DVT since August  - No scans noted to have PE/DVT  - duplex lower ext to without dvt   - d dimer negative        #Chronic pain  - continue home Pain meds of Oxycodone 60 mg q12h and Oxycodone 5mg q6h PRN  -cut down oxycodone 30 mg bid as bp soft    # HLD  - Atorvastatin for rosuvastatin    # COPD  -oxygen supplement  - taper off  steroids   - Symbicort, Spiriva, albuterol  - pulm following     #DM  #steroid induced hyperglycemia  -bg high now  -episode of hypoglycemia   -A1C 8.3  - increase lantus 34 u bid  - increase 32 u meal coverage  -lispro sliding scale  -f/u endocrine rec    DVT prophylaxis: Xarelto   Dispo: long term care   DANE Pt- understood risk of hypotension from narcotic.pt is adamant to get her pain meds.   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 Patient reports that she has been sick all week and was recently started on Vantin and steroids while at the nursing home. Pt admitted for acute on chronic respiratory failure sec to Acute HFpEF exacerbation with likely with Superimposed bacterial Pneumonia in setting of RSV, Severe pulm HTN, new Afib..CT scan which also showed pneumonia and pulmonary HTN. S/P C 12/29 with mild LAD disease,pulm htn. Pt was wean off from BIPAP,now on NC. and was given IV lasix,Started on heparin drip and changed to Xarelto after cath.    New afib RVR  - monitor hr  -  bb 75 mg q8hr(cut down from 150 mg bid for hypotension)  - Xarelto 15 mg qd per renal function  - EP consulted, no DCCV given resp issues  - F/U EP rec  - TTE W or WO Ultrasound Enhancing Agent (12.18.23)EF 60 to 65%. There is moderate (grade 2) left ventricular diastolic dysfunction, with normal filling pressure.      # Acute on chronic respiratory failure  # Acute HFpEF exacerbation   # Superimposed bacterial Pneumonia in setting of RSV  # Severe pulm HTN  - so2 4 L.home 4 L NC  - CT chest reads worsening middle lobe PNA  - S/p [facility] mild LAD disease/ Severe pulmonary HTN 70 / LVEDP stable  - sputum clx ordered for last several days, pt unable to bring up sputum  - repeat MRSA PCR negative  - ID consulted, WBC likely reactive to steroids  - antibiotics stopped on 12/28, WBC trended down  - pt high risk for recurrent aspiration  - repeat blood clx NGTD  - S/P  steroids   - levalbuterol and ipratropium q6hrs, symbicort BID   - Torsemide  - BIPAP qhs  - seen by cardiology/pulm f/u out pt        # JACQUES improve  - likely combination of pre-renal and ATN  - lasix on hold  -cr 1.6- nephrology consulted  - trend BMP    # previous suspicion for PE/ DVT on admission  - Patient has empirically been treated for PE/DVT since August  - No scans noted to have PE/DVT  - duplex lower ext to without dvt   - d dimer negative        #Chronic pain  - continue home Pain meds of Oxycodone 60 mg q12h and Oxycodone 5mg q6h PRN  -cut down oxycodone 30 mg bid as bp soft    # HLD  - Atorvastatin for rosuvastatin    # COPD  -oxygen supplement  - taper off  steroids   - Symbicort, Spiriva, albuterol  - pulm following     #DM  #steroid induced hyperglycemia  -bg high now  -episode of hypoglycemia   -A1C 8.3  - increase lantus 34 u bid  - increase 32 u meal coverage  -lispro sliding scale  -f/u endocrine rec    DVT prophylaxis: Xarelto   Dispo: long term care   DANE Pt- understood risk of hypotension from narcotic.pt is adamant to get her pain meds.   dane rn

## 2024-01-04 NOTE — PROGRESS NOTE ADULT - SUBJECTIVE AND OBJECTIVE BOX
PULMONARY PROGRESS NOTE      [id number]    Patient is a 68y old  Female who presents with a chief complaint of SOB (31 Dec 2023 08:07)      BRIEF HOSPITAL COURSE: ** resp failure  RSV *    Events last 24 hours: **  -currently feeling better  - on  3-4 l/min oxygen tolerated bipap overnight few hours  - cough improved      1/4 - patient remains on 4L, but overall feels better. however still very weak, barely moving lower extremities         REVIEW OF SYSTEMS     CONSTITUTIONAL   no fevers  no loss of appetite  no weight loss   HEENT  no sore throat   no ringing in ears  NECK   no pain   RESPIRATORY  see HPI   CARDIOVASCULAR  no chest  pain no palpitations   GASTROINTESTINAL no vomiting  no diarrhea    no   gerd   MUSCULOSKELETAL  no joint pains    no  back pain   SKIN   no rash   no itchiness   GENITOURINARY  no dysuria   HEME    no bleeding or bruising   ENDOCRINE     no   warmth   no  sweating   no  cold intolerance   NEUROLOGIC  no tremors  no seizures  no    weakness    PSYCHIATRIC   no mood disorder    no delirium   *    PAST MEDICAL & SURGICAL HISTORY:  Diabetes Mellitus Type II      Endometrial Hyperplasia      Cervical Stenosis of Spine      Spinal Stenosis, Lumbar      Deep Vein Thrombosis (DVT)  Left leg, 2004, treated and resolved      Dyslipidemia      Cataract      Morbid Obesity      Vitamin D deficiency      Insomnia      CKD (chronic kidney disease)  ~ III      Congestive heart failure  ~ HFpEF      Uterine cancer      Asthma      On home oxygen therapy      Gait difficulty  ~ u ses walker      Cataract extracted with lens implant 1998  Right      C Section 1994      Cholecystectomy/appendectomy @ age 25      D&C x2 1980's      D&C 2008  hysteroscopy, endometrial hyperplasia, 2009      Cervical Spinal Stenosis surgery x2 (01/2002, 7/2002)      Tonsillectomy as a child      Endometrial biopsy 12/02/09      H/O laser iridotomy  left eye, 2016      H/O colonoscopy  1998      S/P total abdominal hysterectomy and bilateral salpingo-oophorectomy      S/P appendectomy      S/P cholecystectomy      MEDICATIONS  (STANDING):  acetylcysteine 20%  Inhalation 4 milliLiter(s) Inhalation daily  atorvastatin 80 milliGRAM(s) Oral at bedtime  budesonide  80 MICROgram(s)/formoterol 4.5 MICROgram(s) Inhaler 2 Puff(s) Inhalation two times a day  chlorhexidine 2% Cloths 1 Application(s) Topical <User Schedule>  clotrimazole 1% Cream 1 Application(s) Topical two times a day  dextrose 5%. 1000 milliLiter(s) (50 mL/Hr) IV Continuous <Continuous>  dextrose 5%. 1000 milliLiter(s) (100 mL/Hr) IV Continuous <Continuous>  dextrose 50% Injectable 25 Gram(s) IV Push once  dextrose 50% Injectable 12.5 Gram(s) IV Push once  dextrose 50% Injectable 25 Gram(s) IV Push once  famotidine    Tablet 20 milliGRAM(s) Oral daily  glucagon  Injectable 1 milliGRAM(s) IntraMuscular once  insulin glargine Injectable (LANTUS) 34 Unit(s) SubCutaneous at bedtime  insulin lispro (ADMELOG) corrective regimen sliding scale   SubCutaneous three times a day before meals  insulin lispro Injectable (ADMELOG) 32 Unit(s) SubCutaneous three times a day before meals  ipratropium    for Nebulization 500 MICROGram(s) Nebulizer every 6 hours  lactulose Syrup 20 Gram(s) Oral daily  levalbuterol Inhalation 0.63 milliGRAM(s) Inhalation every 6 hours  metoprolol tartrate 75 milliGRAM(s) Oral every 8 hours  nystatin Powder 1 Application(s) Topical two times a day  oxyCODONE  ER Tablet 30 milliGRAM(s) Oral every 12 hours  polyethylene glycol 3350 17 Gram(s) Oral daily  rivaroxaban 15 milliGRAM(s) Oral daily  senna 2 Tablet(s) Oral at bedtime  torsemide 20 milliGRAM(s) Oral daily    MEDICATIONS  (PRN):  acetaminophen     Tablet .. 650 milliGRAM(s) Oral every 6 hours PRN Temp greater or equal to 38C (100.4F), Mild Pain (1 - 3)  ALPRAZolam 0.25 milliGRAM(s) Oral every 8 hours PRN for anxiety/Sleep  aluminum hydroxide/magnesium hydroxide/simethicone Suspension 30 milliLiter(s) Oral once PRN Dyspepsia  bisacodyl Suppository 10 milliGRAM(s) Rectal daily PRN Constipation  dextrose Oral Gel 15 Gram(s) Oral once PRN Blood Glucose LESS THAN 70 milliGRAM(s)/deciliter  diphenhydrAMINE 25 milliGRAM(s) Oral every 6 hours PRN Rash and/or Itching  hydrALAZINE Injectable 10 milliGRAM(s) IV Push every 4 hours PRN for systolic bp > 160  melatonin 3 milliGRAM(s) Oral at bedtime PRN Insomnia  metoprolol tartrate Injectable 5 milliGRAM(s) IV Push every 6 hours PRN heart rate sustaining greater than 130  ondansetron Injectable 4 milliGRAM(s) IV Push every 8 hours PRN Nausea and/or Vomiting  oxyCODONE    IR 5 milliGRAM(s) Oral every 6 hours PRN Moderate Pain (4 - 6)        Vital Signs Last 24 Hrs  T(C): 36.5 (04 Jan 2024 16:10), Max: 36.7 (03 Jan 2024 22:50)  T(F): 97.7 (04 Jan 2024 16:10), Max: 98.1 (04 Jan 2024 05:11)  HR: 112 (04 Jan 2024 16:10) (98 - 115)  BP: 102/51 (04 Jan 2024 16:10) (99/56 - 116/64)  BP(mean): --  RR: 18 (04 Jan 2024 16:10) (17 - 19)  SpO2: 99% (04 Jan 2024 16:10) (95% - 100%)    Parameters below as of 04 Jan 2024 16:10  Patient On (Oxygen Delivery Method): nasal cannula  O2 Flow (L/min): 4          I&O's Detail    30 Dec 2023 07:01  -  31 Dec 2023 07:00  --------------------------------------------------------  IN:    Oral Fluid: 840 mL  Total IN: 840 mL    OUT:    Indwelling Catheter - Urethral (mL): 2750 mL  Total OUT: 2750 mL    Total NET: -1910 mL      31 Dec 2023 07:01  -  31 Dec 2023 14:41  --------------------------------------------------------  IN:    Oral Fluid: 400 mL  Total IN: 400 mL    OUT:    Indwelling Catheter - Urethral (mL): 2050 mL  Total OUT: 2050 mL    Total NET: -1650 mL            LABS:                        13.8   18.80 )-----------( 142      ( 31 Dec 2023 05:51 )             41.9     12-30    138  |  96  |  80.0<H>  ----------------------------<  259<H>  4.6   |  28.0  |  1.64<H>    Ca    9.3      30 Dec 2023 06:11            CAPILLARY BLOOD GLUCOSE      POCT Blood Glucose.: 106 mg/dL (31 Dec 2023 14:36)    PTT - ( 30 Dec 2023 06:11 )  PTT:26.5 sec  Urinalysis Basic - ( 30 Dec 2023 06:11 )    Color: x / Appearance: x / SG: x / pH: x  Gluc: 259 mg/dL / Ketone: x  / Bili: x / Urobili: x   Blood: x / Protein: x / Nitrite: x   Leuk Esterase: x / RBC: x / WBC x   Sq Epi: x / Non Sq Epi: x / Bacteria: x      CULTURES:  Rapid RVP Result: NotDetec (12-30 @ 16:30)  Culture Results:   No growth at 4 days (12-26 @ 09:31)  Culture Results:   No growth at 4 days (12-26 @ 09:21)      Physical Examination:    General: No acute distress.   in bed obese f    HEENT: Pupils equal, reactive to light.  Symmetric.    PULM: Clear to auscultation bilaterally, no significant sputum production    NECK: Supple, no lymphadenopathy, trachea midline    CVS: Regular rate and rhythm, no murmurs, rubs, or gallops    ABD: Soft, nondistended, nontender, normoactive bowel sounds, no masses    EXT:  mid edema  SKIN: Warm and well perfused, no rashes noted.    NEURO: Alert, oriented, interactive, nonfocal    DEVICES:     RADIOLOGY: *  PROCEDURE DATE:  12/30/2023          INTERPRETATION:  CLINICAL STATEMENT: Shortness of breath, heart failure    TECHNIQUE: AP view of the chest.      COMPARISON: 12/26/2023    Limited by patient positioning/rotation.    Heart size may be exaggerated by AP technique and patient body habitus.   Elevation right hemidiaphragm. Question mild pulmonary vascular   congestion. Otherwise, no focal lung consolidation, sizable effusion or   pneumothorax.    No significant osseous abnormality.    IMPRESSION:    Limitation as above.    Question mild pulmonary vascular congestion. No focal lung consolidation,   sizable effusion or pneumothorax.    --- End of Report ---            RAFAEL MIRANDA MD; Attending Radiologist  This document has been electronically signed. Dec 31 2023 12:21PM  **    CRITICAL CARE TIME SPENT: ***   PULMONARY PROGRESS NOTE      [id number]    Patient is a 68y old  Female who presents with a chief complaint of SOB (31 Dec 2023 08:07)      BRIEF HOSPITAL COURSE: ** resp failure  RSV *    Events last 24 hours: **  -currently feeling better  - on  3-4 l/min oxygen tolerated bipap overnight few hours  - cough improved      1/4 - patient remains on 4L, but overall feels better. however still very weak, barely moving lower extremities         REVIEW OF SYSTEMS     CONSTITUTIONAL   no fevers  no loss of appetite  no weight loss   HEENT  no sore throat   no ringing in ears  NECK   no pain   RESPIRATORY  see HPI   CARDIOVASCULAR  no chest  pain no palpitations   GASTROINTESTINAL no vomiting  no diarrhea    no   gerd   MUSCULOSKELETAL  no joint pains    no  back pain   SKIN   no rash   no itchiness   GENITOURINARY  no dysuria   HEME    no bleeding or bruising   ENDOCRINE     no   warmth   no  sweating   no  cold intolerance   NEUROLOGIC  no tremors  no seizures  no    weakness    PSYCHIATRIC   no mood disorder    no delirium   *    PAST MEDICAL & SURGICAL HISTORY:  Diabetes Mellitus Type II      Endometrial Hyperplasia      Cervical Stenosis of Spine      Spinal Stenosis, Lumbar      Deep Vein Thrombosis (DVT)  Left leg, 2004, treated and resolved      Dyslipidemia      Cataract      Morbid Obesity      Vitamin D deficiency      Insomnia      CKD (chronic kidney disease)  ~ III      Congestive heart failure  ~ HFpEF      Uterine cancer      Asthma      On home oxygen therapy      Gait difficulty  ~ u ses walker      Cataract extracted with lens implant 1998  Right      C Section 1994      Cholecystectomy/appendectomy @ age 25      D&C x2 1980's      D&C 2008  hysteroscopy, endometrial hyperplasia, 2009      Cervical Spinal Stenosis surgery x2 (01/2002, 7/2002)      Tonsillectomy as a child      Endometrial biopsy 12/02/09      H/O laser iridotomy  left eye, 2016      H/O colonoscopy  1998      S/P total abdominal hysterectomy and bilateral salpingo-oophorectomy      S/P appendectomy      S/P cholecystectomy      MEDICATIONS  (STANDING):  acetylcysteine 20%  Inhalation 4 milliLiter(s) Inhalation daily  atorvastatin 80 milliGRAM(s) Oral at bedtime  budesonide  80 MICROgram(s)/formoterol 4.5 MICROgram(s) Inhaler 2 Puff(s) Inhalation two times a day  chlorhexidine 2% Cloths 1 Application(s) Topical <User Schedule>  clotrimazole 1% Cream 1 Application(s) Topical two times a day  dextrose 5%. 1000 milliLiter(s) (50 mL/Hr) IV Continuous <Continuous>  dextrose 5%. 1000 milliLiter(s) (100 mL/Hr) IV Continuous <Continuous>  dextrose 50% Injectable 25 Gram(s) IV Push once  dextrose 50% Injectable 12.5 Gram(s) IV Push once  dextrose 50% Injectable 25 Gram(s) IV Push once  famotidine    Tablet 20 milliGRAM(s) Oral daily  glucagon  Injectable 1 milliGRAM(s) IntraMuscular once  insulin glargine Injectable (LANTUS) 34 Unit(s) SubCutaneous at bedtime  insulin lispro (ADMELOG) corrective regimen sliding scale   SubCutaneous three times a day before meals  insulin lispro Injectable (ADMELOG) 32 Unit(s) SubCutaneous three times a day before meals  ipratropium    for Nebulization 500 MICROGram(s) Nebulizer every 6 hours  lactulose Syrup 20 Gram(s) Oral daily  levalbuterol Inhalation 0.63 milliGRAM(s) Inhalation every 6 hours  metoprolol tartrate 75 milliGRAM(s) Oral every 8 hours  nystatin Powder 1 Application(s) Topical two times a day  oxyCODONE  ER Tablet 30 milliGRAM(s) Oral every 12 hours  polyethylene glycol 3350 17 Gram(s) Oral daily  rivaroxaban 15 milliGRAM(s) Oral daily  senna 2 Tablet(s) Oral at bedtime  torsemide 20 milliGRAM(s) Oral daily    MEDICATIONS  (PRN):  acetaminophen     Tablet .. 650 milliGRAM(s) Oral every 6 hours PRN Temp greater or equal to 38C (100.4F), Mild Pain (1 - 3)  ALPRAZolam 0.25 milliGRAM(s) Oral every 8 hours PRN for anxiety/Sleep  aluminum hydroxide/magnesium hydroxide/simethicone Suspension 30 milliLiter(s) Oral once PRN Dyspepsia  bisacodyl Suppository 10 milliGRAM(s) Rectal daily PRN Constipation  dextrose Oral Gel 15 Gram(s) Oral once PRN Blood Glucose LESS THAN 70 milliGRAM(s)/deciliter  diphenhydrAMINE 25 milliGRAM(s) Oral every 6 hours PRN Rash and/or Itching  hydrALAZINE Injectable 10 milliGRAM(s) IV Push every 4 hours PRN for systolic bp > 160  melatonin 3 milliGRAM(s) Oral at bedtime PRN Insomnia  metoprolol tartrate Injectable 5 milliGRAM(s) IV Push every 6 hours PRN heart rate sustaining greater than 130  ondansetron Injectable 4 milliGRAM(s) IV Push every 8 hours PRN Nausea and/or Vomiting  oxyCODONE    IR 5 milliGRAM(s) Oral every 6 hours PRN Moderate Pain (4 - 6)        Vital Signs Last 24 Hrs  T(C): 36.5 (04 Jan 2024 16:10), Max: 36.7 (03 Jan 2024 22:50)  T(F): 97.7 (04 Jan 2024 16:10), Max: 98.1 (04 Jan 2024 05:11)  HR: 112 (04 Jan 2024 16:10) (98 - 115)  BP: 102/51 (04 Jan 2024 16:10) (99/56 - 116/64)  BP(mean): --  RR: 18 (04 Jan 2024 16:10) (17 - 19)  SpO2: 99% (04 Jan 2024 16:10) (95% - 100%)    Parameters below as of 04 Jan 2024 16:10  Patient On (Oxygen Delivery Method): nasal cannula  O2 Flow (L/min): 4          I&O's Detail    30 Dec 2023 07:01  -  31 Dec 2023 07:00  --------------------------------------------------------  IN:    Oral Fluid: 840 mL  Total IN: 840 mL    OUT:    Indwelling Catheter - Urethral (mL): 2750 mL  Total OUT: 2750 mL    Total NET: -1910 mL      31 Dec 2023 07:01  -  31 Dec 2023 14:41  --------------------------------------------------------  IN:    Oral Fluid: 400 mL  Total IN: 400 mL    OUT:    Indwelling Catheter - Urethral (mL): 2050 mL  Total OUT: 2050 mL    Total NET: -1650 mL            LABS:                        13.8   18.80 )-----------( 142      ( 31 Dec 2023 05:51 )             41.9     12-30    138  |  96  |  80.0<H>  ----------------------------<  259<H>  4.6   |  28.0  |  1.64<H>    Ca    9.3      30 Dec 2023 06:11            CAPILLARY BLOOD GLUCOSE      POCT Blood Glucose.: 106 mg/dL (31 Dec 2023 14:36)    PTT - ( 30 Dec 2023 06:11 )  PTT:26.5 sec  Urinalysis Basic - ( 30 Dec 2023 06:11 )    Color: x / Appearance: x / SG: x / pH: x  Gluc: 259 mg/dL / Ketone: x  / Bili: x / Urobili: x   Blood: x / Protein: x / Nitrite: x   Leuk Esterase: x / RBC: x / WBC x   Sq Epi: x / Non Sq Epi: x / Bacteria: x      CULTURES:  Rapid RVP Result: NotDetec (12-30 @ 16:30)  Culture Results:   No growth at 4 days (12-26 @ 09:31)  Culture Results:   No growth at 4 days (12-26 @ 09:21)      Physical Examination:    General: No acute distress.   in bed obese f    HEENT: Pupils equal, reactive to light.  Symmetric.    PULM: Clear to auscultation bilaterally, no significant sputum production    NECK: Supple, no lymphadenopathy, trachea midline    CVS: Regular rate and rhythm, no murmurs, rubs, or gallops    ABD: Soft, nondistended, nontender, normoactive bowel sounds, no masses    EXT:  mid edema  SKIN: Warm and well perfused, no rashes noted.    NEURO: Alert, oriented, interactive, nonfocal    DEVICES:     RADIOLOGY: *  PROCEDURE DATE:  12/30/2023          INTERPRETATION:  CLINICAL STATEMENT: Shortness of breath, heart failure    TECHNIQUE: AP view of the chest.      COMPARISON: 12/26/2023    Limited by patient positioning/rotation.    Heart size may be exaggerated by AP technique and patient body habitus.   Elevation right hemidiaphragm. Question mild pulmonary vascular   congestion. Otherwise, no focal lung consolidation, sizable effusion or   pneumothorax.    No significant osseous abnormality.    IMPRESSION:    Limitation as above.    Question mild pulmonary vascular congestion. No focal lung consolidation,   sizable effusion or pneumothorax.    --- End of Report ---            RAFAEL MIRANDA MD; Attending Radiologist  This document has been electronically signed. Dec 31 2023 12:21PM  **    CRITICAL CARE TIME SPENT: ***

## 2024-01-04 NOTE — PROGRESS NOTE ADULT - PROBLEM SELECTOR PROBLEM 2
Morbid obesity
CAD (coronary artery disease)
Morbid obesity
Respiratory failure with hypercapnia
Morbid obesity
Respiratory failure with hypercapnia
Atrial fibrillation
Morbid obesity
Respiratory failure with hypercapnia
Morbid obesity

## 2024-01-04 NOTE — PROGRESS NOTE ADULT - PROBLEM SELECTOR PROBLEM 5
Pulmonary hypertension

## 2024-01-04 NOTE — PROGRESS NOTE ADULT - PROBLEM SELECTOR PROBLEM 3
Respiratory failure with hypercapnia
RSV infection
Respiratory failure with hypercapnia
Acute on chronic heart failure with preserved ejection fraction (HFpEF)
RSV infection
Respiratory failure with hypercapnia
Atrial fibrillation

## 2024-01-04 NOTE — PROGRESS NOTE ADULT - TIME BILLING
patient  discussion   chart and    imaging
patient chart reviewed  pa bedside
This includes chart review, patient assessment. Antibiotic dosing and management with clinical pharmacy.
greater than 50% of time spent reviewing labs, notes, orders and radiographs, coordinating care  discussed with med team, RT, pt
patient chart reviewed  pa bedside
patient chart reviewed  pa bedside
patient care    chart   and review of meds   co-ordination of care
patient care , labs , meds , chart review
chart review, patient evaluation, documentation, coordination and discussion with nurses, social work, consultants, case management and patient.
chart review, patient evaluation, documentation, coordination and discussion with nurses, social work, consultants, case management and patient.
patient care , labs , meds , chart review
greater than 50% of time spent reviewing labs, notes, orders and radiographs, coordinating care  discussed with 3 T, RT, pt
Acute on chronic hypoxemic respiratory failure, Acute on chronic HFpEF, CKD
Acute on chronic hypoxemic respiratory failure, Acute on chronic HFpEF, pAfib RVR
patient care , labs , meds , chart review
greater than 50% of time spent reviewing labs, notes, orders and radiographs, coordinating care  discussed with nursing, primary team.
Acute on chronic hypoxemic respiratory failure, Acute on chronic HFpEF, Persistent Afib with RVR
Persistent AFib, Acute on chronic HFpEF, Acute on chronic hypoxemic respiratory failure, CKD
Acute on chronic HFpEF, OHS, CKD
HFpEF, severe PH, CKD, morbid obesity, ENRIQUE/OHS
Acute on chronic hypoxemic/hypoxic respiratory failure, Acute on chronic HFpEF, Severe pHTN
Acute on chronic hypoxemic/hypercapneic respiratory failure, Acute on chronic HFpEF, CKD
Acute on chronic HFpEF, respiratory failure, pAfib RVR

## 2024-01-04 NOTE — PROGRESS NOTE ADULT - NUTRITIONAL ASSESSMENT
This patient has been assessed with a concern for Malnutrition and has been determined to have a diagnosis/diagnoses of Severe protein-calorie malnutrition and Morbid obesity (BMI > 40).    This patient is being managed with:   Diet Consistent Carbohydrate w/Evening Snack-  1500mL Fluid Restriction ([id number])  Low Sodium  Entered: Dec 19 2023  6:23PM   This patient has been assessed with a concern for Malnutrition and has been determined to have a diagnosis/diagnoses of Severe protein-calorie malnutrition and Morbid obesity (BMI > 40).    This patient is being managed with:   Diet Consistent Carbohydrate w/Evening Snack-  1500mL Fluid Restriction ([id number])  Low Sodium  Entered: Dec 19 2023  6:23PM

## 2024-01-05 LAB
ANION GAP SERPL CALC-SCNC: 12 MMOL/L — SIGNIFICANT CHANGE UP (ref 5–17)
ANION GAP SERPL CALC-SCNC: 12 MMOL/L — SIGNIFICANT CHANGE UP (ref 5–17)
BUN SERPL-MCNC: 76.8 MG/DL — HIGH (ref 8–20)
BUN SERPL-MCNC: 76.8 MG/DL — HIGH (ref 8–20)
CALCIUM SERPL-MCNC: 9.2 MG/DL — SIGNIFICANT CHANGE UP (ref 8.4–10.5)
CALCIUM SERPL-MCNC: 9.2 MG/DL — SIGNIFICANT CHANGE UP (ref 8.4–10.5)
CHLORIDE SERPL-SCNC: 97 MMOL/L — SIGNIFICANT CHANGE UP (ref 96–108)
CHLORIDE SERPL-SCNC: 97 MMOL/L — SIGNIFICANT CHANGE UP (ref 96–108)
CO2 SERPL-SCNC: 29 MMOL/L — SIGNIFICANT CHANGE UP (ref 22–29)
CO2 SERPL-SCNC: 29 MMOL/L — SIGNIFICANT CHANGE UP (ref 22–29)
CREAT SERPL-MCNC: 1.64 MG/DL — HIGH (ref 0.5–1.3)
CREAT SERPL-MCNC: 1.64 MG/DL — HIGH (ref 0.5–1.3)
EGFR: 34 ML/MIN/1.73M2 — LOW
EGFR: 34 ML/MIN/1.73M2 — LOW
GLUCOSE BLDC GLUCOMTR-MCNC: 108 MG/DL — HIGH (ref 70–99)
GLUCOSE BLDC GLUCOMTR-MCNC: 108 MG/DL — HIGH (ref 70–99)
GLUCOSE BLDC GLUCOMTR-MCNC: 138 MG/DL — HIGH (ref 70–99)
GLUCOSE BLDC GLUCOMTR-MCNC: 138 MG/DL — HIGH (ref 70–99)
GLUCOSE BLDC GLUCOMTR-MCNC: 147 MG/DL — HIGH (ref 70–99)
GLUCOSE BLDC GLUCOMTR-MCNC: 147 MG/DL — HIGH (ref 70–99)
GLUCOSE BLDC GLUCOMTR-MCNC: 162 MG/DL — HIGH (ref 70–99)
GLUCOSE BLDC GLUCOMTR-MCNC: 162 MG/DL — HIGH (ref 70–99)
GLUCOSE BLDC GLUCOMTR-MCNC: 177 MG/DL — HIGH (ref 70–99)
GLUCOSE BLDC GLUCOMTR-MCNC: 177 MG/DL — HIGH (ref 70–99)
GLUCOSE BLDC GLUCOMTR-MCNC: 202 MG/DL — HIGH (ref 70–99)
GLUCOSE BLDC GLUCOMTR-MCNC: 202 MG/DL — HIGH (ref 70–99)
GLUCOSE BLDC GLUCOMTR-MCNC: 284 MG/DL — HIGH (ref 70–99)
GLUCOSE BLDC GLUCOMTR-MCNC: 284 MG/DL — HIGH (ref 70–99)
GLUCOSE SERPL-MCNC: 178 MG/DL — HIGH (ref 70–99)
GLUCOSE SERPL-MCNC: 178 MG/DL — HIGH (ref 70–99)
HCT VFR BLD CALC: 39.1 % — SIGNIFICANT CHANGE UP (ref 34.5–45)
HCT VFR BLD CALC: 39.1 % — SIGNIFICANT CHANGE UP (ref 34.5–45)
HGB BLD-MCNC: 12.7 G/DL — SIGNIFICANT CHANGE UP (ref 11.5–15.5)
HGB BLD-MCNC: 12.7 G/DL — SIGNIFICANT CHANGE UP (ref 11.5–15.5)
MCHC RBC-ENTMCNC: 30 PG — SIGNIFICANT CHANGE UP (ref 27–34)
MCHC RBC-ENTMCNC: 30 PG — SIGNIFICANT CHANGE UP (ref 27–34)
MCHC RBC-ENTMCNC: 32.5 GM/DL — SIGNIFICANT CHANGE UP (ref 32–36)
MCHC RBC-ENTMCNC: 32.5 GM/DL — SIGNIFICANT CHANGE UP (ref 32–36)
MCV RBC AUTO: 92.2 FL — SIGNIFICANT CHANGE UP (ref 80–100)
MCV RBC AUTO: 92.2 FL — SIGNIFICANT CHANGE UP (ref 80–100)
PLATELET # BLD AUTO: 114 K/UL — LOW (ref 150–400)
PLATELET # BLD AUTO: 114 K/UL — LOW (ref 150–400)
POTASSIUM SERPL-MCNC: 3.9 MMOL/L — SIGNIFICANT CHANGE UP (ref 3.5–5.3)
POTASSIUM SERPL-MCNC: 3.9 MMOL/L — SIGNIFICANT CHANGE UP (ref 3.5–5.3)
POTASSIUM SERPL-SCNC: 3.9 MMOL/L — SIGNIFICANT CHANGE UP (ref 3.5–5.3)
POTASSIUM SERPL-SCNC: 3.9 MMOL/L — SIGNIFICANT CHANGE UP (ref 3.5–5.3)
RBC # BLD: 4.24 M/UL — SIGNIFICANT CHANGE UP (ref 3.8–5.2)
RBC # BLD: 4.24 M/UL — SIGNIFICANT CHANGE UP (ref 3.8–5.2)
RBC # FLD: 14.1 % — SIGNIFICANT CHANGE UP (ref 10.3–14.5)
RBC # FLD: 14.1 % — SIGNIFICANT CHANGE UP (ref 10.3–14.5)
SODIUM SERPL-SCNC: 138 MMOL/L — SIGNIFICANT CHANGE UP (ref 135–145)
SODIUM SERPL-SCNC: 138 MMOL/L — SIGNIFICANT CHANGE UP (ref 135–145)
WBC # BLD: 9.91 K/UL — SIGNIFICANT CHANGE UP (ref 3.8–10.5)
WBC # BLD: 9.91 K/UL — SIGNIFICANT CHANGE UP (ref 3.8–10.5)
WBC # FLD AUTO: 9.91 K/UL — SIGNIFICANT CHANGE UP (ref 3.8–10.5)
WBC # FLD AUTO: 9.91 K/UL — SIGNIFICANT CHANGE UP (ref 3.8–10.5)

## 2024-01-05 PROCEDURE — 99232 SBSQ HOSP IP/OBS MODERATE 35: CPT

## 2024-01-05 RX ORDER — INSULIN LISPRO 100/ML
36 VIAL (ML) SUBCUTANEOUS
Refills: 0 | Status: DISCONTINUED | OUTPATIENT
Start: 2024-01-05 | End: 2024-01-08

## 2024-01-05 RX ORDER — LIDOCAINE 4 G/100G
1 CREAM TOPICAL DAILY
Refills: 0 | Status: DISCONTINUED | OUTPATIENT
Start: 2024-01-05 | End: 2024-01-08

## 2024-01-05 RX ADMIN — LEVALBUTEROL 0.63 MILLIGRAM(S): 1.25 SOLUTION, CONCENTRATE RESPIRATORY (INHALATION) at 20:48

## 2024-01-05 RX ADMIN — OXYCODONE HYDROCHLORIDE 5 MILLIGRAM(S): 5 TABLET ORAL at 20:48

## 2024-01-05 RX ADMIN — Medication 500 MICROGRAM(S): at 20:48

## 2024-01-05 RX ADMIN — BUDESONIDE AND FORMOTEROL FUMARATE DIHYDRATE 2 PUFF(S): 160; 4.5 AEROSOL RESPIRATORY (INHALATION) at 20:48

## 2024-01-05 RX ADMIN — Medication 500 MICROGRAM(S): at 03:56

## 2024-01-05 RX ADMIN — OXYCODONE HYDROCHLORIDE 30 MILLIGRAM(S): 5 TABLET ORAL at 06:59

## 2024-01-05 RX ADMIN — Medication 1 APPLICATION(S): at 17:49

## 2024-01-05 RX ADMIN — SENNA PLUS 2 TABLET(S): 8.6 TABLET ORAL at 20:47

## 2024-01-05 RX ADMIN — NYSTATIN CREAM 1 APPLICATION(S): 100000 CREAM TOPICAL at 05:55

## 2024-01-05 RX ADMIN — OXYCODONE HYDROCHLORIDE 30 MILLIGRAM(S): 5 TABLET ORAL at 17:47

## 2024-01-05 RX ADMIN — RIVAROXABAN 15 MILLIGRAM(S): KIT at 12:59

## 2024-01-05 RX ADMIN — Medication 32 UNIT(S): at 09:21

## 2024-01-05 RX ADMIN — Medication 20 MILLIGRAM(S): at 09:23

## 2024-01-05 RX ADMIN — Medication 4: at 09:20

## 2024-01-05 RX ADMIN — Medication 75 MILLIGRAM(S): at 04:39

## 2024-01-05 RX ADMIN — INSULIN GLARGINE 34 UNIT(S): 100 INJECTION, SOLUTION SUBCUTANEOUS at 22:37

## 2024-01-05 RX ADMIN — BUDESONIDE AND FORMOTEROL FUMARATE DIHYDRATE 2 PUFF(S): 160; 4.5 AEROSOL RESPIRATORY (INHALATION) at 08:41

## 2024-01-05 RX ADMIN — LEVALBUTEROL 0.63 MILLIGRAM(S): 1.25 SOLUTION, CONCENTRATE RESPIRATORY (INHALATION) at 03:56

## 2024-01-05 RX ADMIN — OXYCODONE HYDROCHLORIDE 5 MILLIGRAM(S): 5 TABLET ORAL at 00:00

## 2024-01-05 RX ADMIN — INSULIN GLARGINE 34 UNIT(S): 100 INJECTION, SOLUTION SUBCUTANEOUS at 09:20

## 2024-01-05 RX ADMIN — OXYCODONE HYDROCHLORIDE 5 MILLIGRAM(S): 5 TABLET ORAL at 12:58

## 2024-01-05 RX ADMIN — CHLORHEXIDINE GLUCONATE 1 APPLICATION(S): 213 SOLUTION TOPICAL at 09:22

## 2024-01-05 RX ADMIN — Medication 0.25 MILLIGRAM(S): at 09:37

## 2024-01-05 RX ADMIN — Medication 75 MILLIGRAM(S): at 16:04

## 2024-01-05 RX ADMIN — NYSTATIN CREAM 1 APPLICATION(S): 100000 CREAM TOPICAL at 17:49

## 2024-01-05 RX ADMIN — Medication 32 UNIT(S): at 12:58

## 2024-01-05 RX ADMIN — Medication 6: at 12:57

## 2024-01-05 RX ADMIN — ATORVASTATIN CALCIUM 80 MILLIGRAM(S): 80 TABLET, FILM COATED ORAL at 20:47

## 2024-01-05 RX ADMIN — FAMOTIDINE 20 MILLIGRAM(S): 10 INJECTION INTRAVENOUS at 17:46

## 2024-01-05 RX ADMIN — Medication 75 MILLIGRAM(S): at 20:47

## 2024-01-05 RX ADMIN — Medication 1 APPLICATION(S): at 09:22

## 2024-01-05 RX ADMIN — OXYCODONE HYDROCHLORIDE 30 MILLIGRAM(S): 5 TABLET ORAL at 05:59

## 2024-01-05 NOTE — PROGRESS NOTE ADULT - ASSESSMENT
68 year old female with PMH of morbidly obesity (500+lbs), DM, HTN, HLD, asthma, HFpEF, chronic hypoxic respiratory failure on 4L home oxygen, CKD, spinal stenosis (cervical spine + lumbar spine) and hx of DVT presents with worsening SOB. . Nephrology is consulted for worsening NED on CKD.     Ned on CKD stage III  -Baseline creatinine is ~1.6- 2.0 mg/dL in Oct 2022 (as per [facility])  -On admission, creatinine was1.4- peaked at 2.2  Ned in setting of CHF exacerbation  -Scr now stable  nephrology follow up outpt               68 year old female with PMH of morbidly obesity (500+lbs), DM, HTN, HLD, asthma, HFpEF, chronic hypoxic respiratory failure on 4L home oxygen, CKD, spinal stenosis (cervical spine + lumbar spine) and hx of DVT presents with worsening SOB. . Nephrology is consulted for worsening NED on CKD.     Ned on CKD stage III  -Baseline creatinine is ~1.6- 2.0 mg/dL in Oct 2022 (as per [facility])  -On admission, creatinine was1.4- peaked at 2.2  Ned in setting of CHF exacerbation  -Scr now stable  nephrology follow up outpt

## 2024-01-05 NOTE — PROGRESS NOTE ADULT - NS ATTEND AMEND GEN_ALL_CORE FT
plan discussed with NP and changes incorporated in the note.    agree with the plan above  still running high, will increase doses as above

## 2024-01-05 NOTE — PROGRESS NOTE ADULT - ASSESSMENT
67 yo female, morbidly obese, with HTN, HLD, DM2, HFpEF, CKD III, DVT, Uterine CA, COPD on home o2 who presented with complaints of shortness of breath. Admitted with acute on chronic resp failure and acute HFpEF exacerbation with likely with Superimposed bacterial Pneumonia in setting of RSV and new onset AFib and worsening GFR. S/p [facility] with mild LAD disease, Severe pulmonary HTN, LVEDP stable    INCOMPLETE    1. T2DM complicated by CKD- BG elevated  - Increase premeal Admelog to ** units TID with meals  - Increase Lantus ** units BID  - Sliding scale coverage    2. HLD  - Continue statin    3. Right leg pain  - US obtained without evidence of stenosis/thrombus  - No intervention as per vascular 67 yo female, morbidly obese, with HTN, HLD, DM2, HFpEF, CKD III, DVT, Uterine CA, COPD on home o2 who presented with complaints of shortness of breath. Admitted with acute on chronic resp failure and acute HFpEF exacerbation with likely with Superimposed bacterial Pneumonia in setting of RSV and new onset AFib and worsening GFR. S/p [facility] with mild LAD disease, Severe pulmonary HTN, LVEDP stable    INCOMPLETE    1. T2DM complicated by CKD- BG elevated  - Increase premeal Admelog to ** units TID with meals  - Increase Lantus ** units BID  - Sliding scale coverage    2. HLD  - Continue statin    3. Right leg pain  - US obtained without evidence of stenosis/thrombus  - No intervention as per vascular 67 yo female, morbidly obese, with HTN, HLD, DM2, HFpEF, CKD III, DVT, Uterine CA, COPD on home o2 who presented with complaints of shortness of breath. Admitted with acute on chronic resp failure and acute HFpEF exacerbation with likely with Superimposed bacterial Pneumonia in setting of RSV and new onset AFib and worsening GFR. S/p [facility] with mild LAD disease, Severe pulmonary HTN, LVEDP stable      1. T2DM complicated by CKD- BG elevated  - Increase premeal Admelog to 36 units TID with meals  - Continue with Lantus 34 units BID  - Sliding scale coverage    2. HLD  - Continue statin    3. Right leg pain  - US obtained without evidence of stenosis/thrombus  - No intervention as per vascular 69 yo female, morbidly obese, with HTN, HLD, DM2, HFpEF, CKD III, DVT, Uterine CA, COPD on home o2 who presented with complaints of shortness of breath. Admitted with acute on chronic resp failure and acute HFpEF exacerbation with likely with Superimposed bacterial Pneumonia in setting of RSV and new onset AFib and worsening GFR. S/p [facility] with mild LAD disease, Severe pulmonary HTN, LVEDP stable      1. T2DM complicated by CKD- BG elevated  - Increase premeal Admelog to 36 units TID with meals  - Continue with Lantus 34 units BID  - Sliding scale coverage    2. HLD  - Continue statin    3. Right leg pain  - US obtained without evidence of stenosis/thrombus  - No intervention as per vascular

## 2024-01-05 NOTE — PROGRESS NOTE ADULT - SUBJECTIVE AND OBJECTIVE BOX
Patient is a 68y old  Female who presents with a chief complaint of SOB (05 Jan 2024 12:24)      no acute issues  has chronic back pain  REVIEW OF SYSTEMS: All systems are reviewed and found to be negative except above    MEDICATIONS  (STANDING):  acetylcysteine 20%  Inhalation 4 milliLiter(s) Inhalation daily  atorvastatin 80 milliGRAM(s) Oral at bedtime  budesonide  80 MICROgram(s)/formoterol 4.5 MICROgram(s) Inhaler 2 Puff(s) Inhalation two times a day  chlorhexidine 2% Cloths 1 Application(s) Topical <User Schedule>  clotrimazole 1% Cream 1 Application(s) Topical two times a day  dextrose 5%. 1000 milliLiter(s) (100 mL/Hr) IV Continuous <Continuous>  dextrose 5%. 1000 milliLiter(s) (50 mL/Hr) IV Continuous <Continuous>  dextrose 50% Injectable 12.5 Gram(s) IV Push once  dextrose 50% Injectable 25 Gram(s) IV Push once  dextrose 50% Injectable 25 Gram(s) IV Push once  famotidine    Tablet 20 milliGRAM(s) Oral daily  glucagon  Injectable 1 milliGRAM(s) IntraMuscular once  insulin glargine Injectable (LANTUS) 34 Unit(s) SubCutaneous every morning  insulin glargine Injectable (LANTUS) 34 Unit(s) SubCutaneous at bedtime  insulin lispro (ADMELOG) corrective regimen sliding scale   SubCutaneous three times a day before meals  insulin lispro Injectable (ADMELOG) 36 Unit(s) SubCutaneous three times a day before meals  ipratropium    for Nebulization 500 MICROGram(s) Nebulizer every 6 hours  lactulose Syrup 20 Gram(s) Oral daily  levalbuterol Inhalation 0.63 milliGRAM(s) Inhalation every 6 hours  lidocaine   4% Patch 1 Patch Transdermal daily  metoprolol tartrate 75 milliGRAM(s) Oral every 8 hours  nystatin Powder 1 Application(s) Topical two times a day  oxyCODONE  ER Tablet 30 milliGRAM(s) Oral every 12 hours  polyethylene glycol 3350 17 Gram(s) Oral daily  rivaroxaban 15 milliGRAM(s) Oral daily  senna 2 Tablet(s) Oral at bedtime  torsemide 20 milliGRAM(s) Oral daily    MEDICATIONS  (PRN):  acetaminophen     Tablet .. 650 milliGRAM(s) Oral every 6 hours PRN Temp greater or equal to 38C (100.4F), Mild Pain (1 - 3)  ALPRAZolam 0.25 milliGRAM(s) Oral every 8 hours PRN for anxiety/Sleep  aluminum hydroxide/magnesium hydroxide/simethicone Suspension 30 milliLiter(s) Oral once PRN Dyspepsia  bisacodyl Suppository 10 milliGRAM(s) Rectal daily PRN Constipation  dextrose Oral Gel 15 Gram(s) Oral once PRN Blood Glucose LESS THAN 70 milliGRAM(s)/deciliter  diphenhydrAMINE 25 milliGRAM(s) Oral every 6 hours PRN Rash and/or Itching  hydrALAZINE Injectable 10 milliGRAM(s) IV Push every 4 hours PRN for systolic bp > 160  melatonin 3 milliGRAM(s) Oral at bedtime PRN Insomnia  metoprolol tartrate Injectable 5 milliGRAM(s) IV Push every 6 hours PRN heart rate sustaining greater than 130  ondansetron Injectable 4 milliGRAM(s) IV Push every 8 hours PRN Nausea and/or Vomiting  oxyCODONE    IR 5 milliGRAM(s) Oral every 6 hours PRN Moderate Pain (4 - 6)      CAPILLARY BLOOD GLUCOSE      POCT Blood Glucose.: 284 mg/dL (05 Jan 2024 12:12)  POCT Blood Glucose.: 202 mg/dL (05 Jan 2024 08:40)  POCT Blood Glucose.: 177 mg/dL (05 Jan 2024 06:12)  POCT Blood Glucose.: 162 mg/dL (05 Jan 2024 02:10)  POCT Blood Glucose.: 165 mg/dL (04 Jan 2024 22:41)  POCT Blood Glucose.: 217 mg/dL (04 Jan 2024 21:02)  POCT Blood Glucose.: 183 mg/dL (04 Jan 2024 16:56)    I&O's Summary    04 Jan 2024 07:01  -  05 Jan 2024 07:00  --------------------------------------------------------  IN: 0 mL / OUT: 1500 mL / NET: -1500 mL    05 Jan 2024 07:01  -  05 Jan 2024 13:47  --------------------------------------------------------  IN: 0 mL / OUT: 1070 mL / NET: -1070 mL        PHYSICAL EXAM:  Vital Signs Last 24 Hrs  T(C): 36.7 (05 Jan 2024 12:30), Max: 37.2 (05 Jan 2024 00:42)  T(F): 98 (05 Jan 2024 12:30), Max: 99 (05 Jan 2024 00:42)  HR: 100 (05 Jan 2024 12:30) (77 - 121)  BP: 103/65 (05 Jan 2024 12:30) (96/57 - 105/68)  BP(mean): --  RR: 19 (05 Jan 2024 12:30) (18 - 20)  SpO2: 98% (05 Jan 2024 12:30) (95% - 100%)    Parameters below as of 05 Jan 2024 12:30  Patient On (Oxygen Delivery Method): nasal cannula  O2 Flow (L/min): 4      CONSTITUTIONAL: NAD,obese  EYES: PERRLA; conjunctiva and sclera clear  ENMT: Moist oral mucosa,   RESPIRATORY: Normal respiratory effort; mild crackles to auscultation bilaterally  CARDIOVASCULAR: Regular rate and rhythm, normal S1 and S2, no murmur   EXTS: chronic lower extremity edema; Peripheral pulses are 2+ bilaterally  ABDOMEN: Nontender to palpation, normoactive bowel sounds, no rebound/guarding;   MUSCLOSKELETAL:   no joint swelling or tenderness to palpation  PSYCH: calm  NEUROLOGY: A+O to person, place, and time; ; no gross  deficits;       LABS:                        12.7   9.91  )-----------( 114      ( 05 Jan 2024 05:07 )             39.1     01-05    138  |  97  |  76.8<H>  ----------------------------<  178<H>  3.9   |  29.0  |  1.64<H>    Ca    9.2      05 Jan 2024 05:07            Urinalysis Basic - ( 05 Jan 2024 05:07 )    Color: x / Appearance: x / SG: x / pH: x  Gluc: 178 mg/dL / Ketone: x  / Bili: x / Urobili: x   Blood: x / Protein: x / Nitrite: x   Leuk Esterase: x / RBC: x / WBC x   Sq Epi: x / Non Sq Epi: x / Bacteria: x          RADIOLOGY & ADDITIONAL TESTS:  Results Reviewed:

## 2024-01-05 NOTE — PROGRESS NOTE ADULT - SUBJECTIVE AND OBJECTIVE BOX
INTERVAL HPI/OVERNIGHT EVENTS:  follow up for diabetes management    ros: endorses good appetite. denies chest pain or sob at time of visit    MEDICATIONS  (STANDING):  acetylcysteine 20%  Inhalation 4 milliLiter(s) Inhalation daily  atorvastatin 80 milliGRAM(s) Oral at bedtime  budesonide  80 MICROgram(s)/formoterol 4.5 MICROgram(s) Inhaler 2 Puff(s) Inhalation two times a day  chlorhexidine 2% Cloths 1 Application(s) Topical <User Schedule>  clotrimazole 1% Cream 1 Application(s) Topical two times a day  dextrose 5%. 1000 milliLiter(s) (100 mL/Hr) IV Continuous <Continuous>  dextrose 5%. 1000 milliLiter(s) (50 mL/Hr) IV Continuous <Continuous>  dextrose 50% Injectable 25 Gram(s) IV Push once  dextrose 50% Injectable 12.5 Gram(s) IV Push once  dextrose 50% Injectable 25 Gram(s) IV Push once  famotidine    Tablet 20 milliGRAM(s) Oral daily  glucagon  Injectable 1 milliGRAM(s) IntraMuscular once  insulin glargine Injectable (LANTUS) 34 Unit(s) SubCutaneous every morning  insulin glargine Injectable (LANTUS) 34 Unit(s) SubCutaneous at bedtime  insulin lispro (ADMELOG) corrective regimen sliding scale   SubCutaneous three times a day before meals  insulin lispro Injectable (ADMELOG) 32 Unit(s) SubCutaneous three times a day before meals  ipratropium    for Nebulization 500 MICROGram(s) Nebulizer every 6 hours  lactulose Syrup 20 Gram(s) Oral daily  levalbuterol Inhalation 0.63 milliGRAM(s) Inhalation every 6 hours  lidocaine   4% Patch 1 Patch Transdermal daily  metoprolol tartrate 75 milliGRAM(s) Oral every 8 hours  nystatin Powder 1 Application(s) Topical two times a day  oxyCODONE  ER Tablet 30 milliGRAM(s) Oral every 12 hours  polyethylene glycol 3350 17 Gram(s) Oral daily  rivaroxaban 15 milliGRAM(s) Oral daily  senna 2 Tablet(s) Oral at bedtime  torsemide 20 milliGRAM(s) Oral daily    MEDICATIONS  (PRN):  acetaminophen     Tablet .. 650 milliGRAM(s) Oral every 6 hours PRN Temp greater or equal to 38C (100.4F), Mild Pain (1 - 3)  ALPRAZolam 0.25 milliGRAM(s) Oral every 8 hours PRN for anxiety/Sleep  aluminum hydroxide/magnesium hydroxide/simethicone Suspension 30 milliLiter(s) Oral once PRN Dyspepsia  bisacodyl Suppository 10 milliGRAM(s) Rectal daily PRN Constipation  dextrose Oral Gel 15 Gram(s) Oral once PRN Blood Glucose LESS THAN 70 milliGRAM(s)/deciliter  diphenhydrAMINE 25 milliGRAM(s) Oral every 6 hours PRN Rash and/or Itching  hydrALAZINE Injectable 10 milliGRAM(s) IV Push every 4 hours PRN for systolic bp > 160  melatonin 3 milliGRAM(s) Oral at bedtime PRN Insomnia  metoprolol tartrate Injectable 5 milliGRAM(s) IV Push every 6 hours PRN heart rate sustaining greater than 130  ondansetron Injectable 4 milliGRAM(s) IV Push every 8 hours PRN Nausea and/or Vomiting  oxyCODONE    IR 5 milliGRAM(s) Oral every 6 hours PRN Moderate Pain (4 - 6)      Allergies  wool- rash, itch (Other)  adhesives (Rash)  latex (Rash)  Bactrim (Flushing)      Vital Signs Last 24 Hrs  T(C): 36.6 (05 Jan 2024 08:00), Max: 37.2 (05 Jan 2024 00:42)  T(F): 97.9 (05 Jan 2024 08:00), Max: 99 (05 Jan 2024 00:42)  HR: 90 (05 Jan 2024 08:42) (77 - 121)  BP: 102/56 (05 Jan 2024 08:00) (96/57 - 105/68)  BP(mean): --  RR: 18 (05 Jan 2024 08:00) (18 - 20)  SpO2: 97% (05 Jan 2024 08:00) (95% - 100%)    Parameters below as of 05 Jan 2024 08:42  Patient On (Oxygen Delivery Method): nasal cannula, 4l        PHYSICAL EXAM:  General: No apparent distress, obese  Neck: Supple, trachea midline  Respiratory: Lungs clear bilaterally, normal rate, effort  Cardiac: +S1, S2, no m/r/g  GI: +BS, soft, non tender, non distended  Extremities: Bilateral LE chronic skin changes  Neuro: A+O X3, no tremor          LABS:                        12.7   9.91  )-----------( 114      ( 05 Jan 2024 05:07 )             39.1     01-05    138  |  97  |  76.8<H>  ----------------------------<  178<H>  3.9   |  29.0  |  1.64<H>    Ca    9.2      05 Jan 2024 05:07      Urinalysis Basic - ( 05 Jan 2024 05:07 )    Color: x / Appearance: x / SG: x / pH: x  Gluc: 178 mg/dL / Ketone: x  / Bili: x / Urobili: x   Blood: x / Protein: x / Nitrite: x   Leuk Esterase: x / RBC: x / WBC x   Sq Epi: x / Non Sq Epi: x / Bacteria: x          POCT Blood Glucose.: 284 mg/dL (01-05-24 @ 12:12)  POCT Blood Glucose.: 202 mg/dL (01-05-24 @ 08:40)  POCT Blood Glucose.: 177 mg/dL (01-05-24 @ 06:12)  POCT Blood Glucose.: 162 mg/dL (01-05-24 @ 02:10)  POCT Blood Glucose.: 165 mg/dL (01-04-24 @ 22:41)  POCT Blood Glucose.: 217 mg/dL (01-04-24 @ 21:02)  POCT Blood Glucose.: 183 mg/dL (01-04-24 @ 16:56)        Thyroid Stimulating Hormone, Serum: 1.42 uIU/mL (12-29-23 @ 05:52)  Free Thyroxine, Serum: 1.6 ng/dL (12-29-23 @ 05:52)  Thyroid Stimulating Hormone, Serum: 0.34 uIU/mL (12-22-23 @ 04:51)  Thyroid Stimulating Hormone, Serum: 1.51 uIU/mL (12-16-23 @ 15:20)  Thyroid Stimulating Hormone, Serum: 3.02 uIU/mL (12-16-23 @ 10:18)

## 2024-01-05 NOTE — PROGRESS NOTE ADULT - SUBJECTIVE AND OBJECTIVE BOX
[facility] DIVISION OF KIDNEY DISEASES AND HYPERTENSION -- FOLLOW UP NOTE  --------------------------------------------------------------------------------  Chief Complaint: Ned    24 hour events/subjective:  no acute complaint  Pt seen and examined; feels well      PAST HISTORY  --------------------------------------------------------------------------------  No significant changes to PMH, PSH, FHx, SHx, unless otherwise noted    ALLERGIES & MEDICATIONS  --------------------------------------------------------------------------------  Allergies    wool- rash, itch (Other)  adhesives (Rash)  latex (Rash)  Bactrim (Flushing)    I    Standing Inpatient Medications  acetylcysteine 20%  Inhalation 4 milliLiter(s) Inhalation daily  atorvastatin 80 milliGRAM(s) Oral at bedtime  budesonide  80 MICROgram(s)/formoterol 4.5 MICROgram(s) Inhaler 2 Puff(s) Inhalation two times a day  chlorhexidine 2% Cloths 1 Application(s) Topical <User Schedule>  clotrimazole 1% Cream 1 Application(s) Topical two times a day  dextrose 5%. 1000 milliLiter(s) IV Continuous <Continuous>  dextrose 5%. 1000 milliLiter(s) IV Continuous <Continuous>  dextrose 50% Injectable 25 Gram(s) IV Push once  dextrose 50% Injectable 12.5 Gram(s) IV Push once  dextrose 50% Injectable 25 Gram(s) IV Push once  famotidine    Tablet 20 milliGRAM(s) Oral daily  glucagon  Injectable 1 milliGRAM(s) IntraMuscular once  insulin glargine Injectable (LANTUS) 34 Unit(s) SubCutaneous at bedtime  insulin glargine Injectable (LANTUS) 34 Unit(s) SubCutaneous every morning  insulin lispro (ADMELOG) corrective regimen sliding scale   SubCutaneous three times a day before meals  insulin lispro Injectable (ADMELOG) 36 Unit(s) SubCutaneous three times a day before meals  ipratropium    for Nebulization 500 MICROGram(s) Nebulizer every 6 hours  lactulose Syrup 20 Gram(s) Oral daily  levalbuterol Inhalation 0.63 milliGRAM(s) Inhalation every 6 hours  lidocaine   4% Patch 1 Patch Transdermal daily  metoprolol tartrate 75 milliGRAM(s) Oral every 8 hours  nystatin Powder 1 Application(s) Topical two times a day  oxyCODONE  ER Tablet 30 milliGRAM(s) Oral every 12 hours  polyethylene glycol 3350 17 Gram(s) Oral daily  rivaroxaban 15 milliGRAM(s) Oral daily  senna 2 Tablet(s) Oral at bedtime  torsemide 20 milliGRAM(s) Oral daily    PRN Inpatient Medications  acetaminophen     Tablet .. 650 milliGRAM(s) Oral every 6 hours PRN  ALPRAZolam 0.25 milliGRAM(s) Oral every 8 hours PRN  aluminum hydroxide/magnesium hydroxide/simethicone Suspension 30 milliLiter(s) Oral once PRN  bisacodyl Suppository 10 milliGRAM(s) Rectal daily PRN  dextrose Oral Gel 15 Gram(s) Oral once PRN  diphenhydrAMINE 25 milliGRAM(s) Oral every 6 hours PRN  hydrALAZINE Injectable 10 milliGRAM(s) IV Push every 4 hours PRN  melatonin 3 milliGRAM(s) Oral at bedtime PRN  metoprolol tartrate Injectable 5 milliGRAM(s) IV Push every 6 hours PRN  ondansetron Injectable 4 milliGRAM(s) IV Push every 8 hours PRN  oxyCODONE    IR 5 milliGRAM(s) Oral every 6 hours PRN      REVIEW OF SYSTEMS  --------------------------------------------------------------------------------  Gen: No weight changes, fatigue, fevers/chills, weakness  Skin: No rashes  Head/Eyes/Ears/Mouth: No headache; Normal hearing; Normal vision w/o blurriness; No sinus pain/discomfort, sore throat  Respiratory: No dyspnea, cough, wheezing, hemoptysis  CV: No chest pain, PND, orthopnea  GI: No abdominal pain, diarrhea, constipation, nausea, vomiting, melena, hematochezia  : No increased frequency, dysuria, hematuria, nocturia  MSK: No joint pain/swelling; no back pain; no edema  Neuro: No dizziness/lightheadedness, weakness, seizures, numbness, tingling  Heme: No easy bruising or bleeding  Endo: No heat/cold intolerance  Psych: No significant nervousness, anxiety, stress, depression    All other systems were reviewed and are negative, except as noted.    VITALS/PHYSICAL EXAM  --------------------------------------------------------------------------------  T(C): 36.7 (01-05-24 @ 12:30), Max: 37.2 (01-05-24 @ 00:42)  HR: 100 (01-05-24 @ 12:30) (77 - 121)  BP: 103/65 (01-05-24 @ 12:30) (96/57 - 105/68)  RR: 19 (01-05-24 @ 12:30) (18 - 20)  SpO2: 98% (01-05-24 @ 12:30) (96% - 100%)  Wt(kg): --        01-04-24 @ 07:01  -  01-05-24 @ 07:00  --------------------------------------------------------  IN: 0 mL / OUT: 1500 mL / NET: -1500 mL    01-05-24 @ 07:01  -  01-05-24 @ 14:22  --------------------------------------------------------  IN: 0 mL / OUT: 1070 mL / NET: -1070 mL      Physical Exam:  	Gen: obese woman  	HEENT: on NC  	Pulm: CTA B/L ant  	CV: RRR, S1S2; no rub  	Abd: +BS, soft, nontender/nondistended  	: de la torre+  	UE: Warm,  no edema  	LE: Warm, ; no edema  	Neuro: No focal deficit  	Psych: Normal affect and mood  	Skin: Warm, without rashes  	Vascular access:    LABS/STUDIES  --------------------------------------------------------------------------------              12.7   9.91  >-----------<  114      [01-05-24 @ 05:07]              39.1     138  |  97  |  76.8  ----------------------------<  178      [01-05-24 @ 05:07]  3.9   |  29.0  |  1.64        Ca     9.2     [01-05-24 @ 05:07]            Creatinine Trend:  SCr 1.64 [01-05 @ 05:07]  SCr 1.61 [01-04 @ 05:31]  SCr 1.73 [01-03 @ 05:21]  SCr 1.85 [01-02 @ 00:05]  SCr 1.59 [01-01 @ 06:25]    Urinalysis - [01-05-24 @ 05:07]      Color  / Appearance  / SG  / pH       Gluc 178 / Ketone   / Bili  / Urobili        Blood  / Protein  / Leuk Est  / Nitrite       RBC  / WBC  / Hyaline  / Gran  / Sq Epi  / Non Sq Epi  / Bacteria       TSH 1.42      [12-29-23 @ 05:52]  Lipid: chol 140, , HDL 70, LDL --      [12-17-23 @ 08:20]       [facility] DIVISION OF KIDNEY DISEASES AND HYPERTENSION -- FOLLOW UP NOTE  --------------------------------------------------------------------------------  Chief Complaint: Ned    24 hour events/subjective:  no acute complaint  Pt seen and examined; feels well      PAST HISTORY  --------------------------------------------------------------------------------  No significant changes to PMH, PSH, FHx, SHx, unless otherwise noted    ALLERGIES & MEDICATIONS  --------------------------------------------------------------------------------  Allergies    wool- rash, itch (Other)  adhesives (Rash)  latex (Rash)  Bactrim (Flushing)    I    Standing Inpatient Medications  acetylcysteine 20%  Inhalation 4 milliLiter(s) Inhalation daily  atorvastatin 80 milliGRAM(s) Oral at bedtime  budesonide  80 MICROgram(s)/formoterol 4.5 MICROgram(s) Inhaler 2 Puff(s) Inhalation two times a day  chlorhexidine 2% Cloths 1 Application(s) Topical <User Schedule>  clotrimazole 1% Cream 1 Application(s) Topical two times a day  dextrose 5%. 1000 milliLiter(s) IV Continuous <Continuous>  dextrose 5%. 1000 milliLiter(s) IV Continuous <Continuous>  dextrose 50% Injectable 25 Gram(s) IV Push once  dextrose 50% Injectable 12.5 Gram(s) IV Push once  dextrose 50% Injectable 25 Gram(s) IV Push once  famotidine    Tablet 20 milliGRAM(s) Oral daily  glucagon  Injectable 1 milliGRAM(s) IntraMuscular once  insulin glargine Injectable (LANTUS) 34 Unit(s) SubCutaneous at bedtime  insulin glargine Injectable (LANTUS) 34 Unit(s) SubCutaneous every morning  insulin lispro (ADMELOG) corrective regimen sliding scale   SubCutaneous three times a day before meals  insulin lispro Injectable (ADMELOG) 36 Unit(s) SubCutaneous three times a day before meals  ipratropium    for Nebulization 500 MICROGram(s) Nebulizer every 6 hours  lactulose Syrup 20 Gram(s) Oral daily  levalbuterol Inhalation 0.63 milliGRAM(s) Inhalation every 6 hours  lidocaine   4% Patch 1 Patch Transdermal daily  metoprolol tartrate 75 milliGRAM(s) Oral every 8 hours  nystatin Powder 1 Application(s) Topical two times a day  oxyCODONE  ER Tablet 30 milliGRAM(s) Oral every 12 hours  polyethylene glycol 3350 17 Gram(s) Oral daily  rivaroxaban 15 milliGRAM(s) Oral daily  senna 2 Tablet(s) Oral at bedtime  torsemide 20 milliGRAM(s) Oral daily    PRN Inpatient Medications  acetaminophen     Tablet .. 650 milliGRAM(s) Oral every 6 hours PRN  ALPRAZolam 0.25 milliGRAM(s) Oral every 8 hours PRN  aluminum hydroxide/magnesium hydroxide/simethicone Suspension 30 milliLiter(s) Oral once PRN  bisacodyl Suppository 10 milliGRAM(s) Rectal daily PRN  dextrose Oral Gel 15 Gram(s) Oral once PRN  diphenhydrAMINE 25 milliGRAM(s) Oral every 6 hours PRN  hydrALAZINE Injectable 10 milliGRAM(s) IV Push every 4 hours PRN  melatonin 3 milliGRAM(s) Oral at bedtime PRN  metoprolol tartrate Injectable 5 milliGRAM(s) IV Push every 6 hours PRN  ondansetron Injectable 4 milliGRAM(s) IV Push every 8 hours PRN  oxyCODONE    IR 5 milliGRAM(s) Oral every 6 hours PRN      REVIEW OF SYSTEMS  --------------------------------------------------------------------------------  Gen: No weight changes, fatigue, fevers/chills, weakness  Skin: No rashes  Head/Eyes/Ears/Mouth: No headache; Normal hearing; Normal vision w/o blurriness; No sinus pain/discomfort, sore throat  Respiratory: No dyspnea, cough, wheezing, hemoptysis  CV: No chest pain, PND, orthopnea  GI: No abdominal pain, diarrhea, constipation, nausea, vomiting, melena, hematochezia  : No increased frequency, dysuria, hematuria, nocturia  MSK: No joint pain/swelling; no back pain; no edema  Neuro: No dizziness/lightheadedness, weakness, seizures, numbness, tingling  Heme: No easy bruising or bleeding  Endo: No heat/cold intolerance  Psych: No significant nervousness, anxiety, stress, depression    All other systems were reviewed and are negative, except as noted.    VITALS/PHYSICAL EXAM  --------------------------------------------------------------------------------  T(C): 36.7 (01-05-24 @ 12:30), Max: 37.2 (01-05-24 @ 00:42)  HR: 100 (01-05-24 @ 12:30) (77 - 121)  BP: 103/65 (01-05-24 @ 12:30) (96/57 - 105/68)  RR: 19 (01-05-24 @ 12:30) (18 - 20)  SpO2: 98% (01-05-24 @ 12:30) (96% - 100%)  Wt(kg): --        01-04-24 @ 07:01  -  01-05-24 @ 07:00  --------------------------------------------------------  IN: 0 mL / OUT: 1500 mL / NET: -1500 mL    01-05-24 @ 07:01  -  01-05-24 @ 14:22  --------------------------------------------------------  IN: 0 mL / OUT: 1070 mL / NET: -1070 mL      Physical Exam:  	Gen: obese woman  	HEENT: on NC  	Pulm: CTA B/L ant  	CV: RRR, S1S2; no rub  	Abd: +BS, soft, nontender/nondistended  	: d ela torre+  	UE: Warm,  no edema  	LE: Warm, ; no edema  	Neuro: No focal deficit  	Psych: Normal affect and mood  	Skin: Warm, without rashes  	Vascular access:    LABS/STUDIES  --------------------------------------------------------------------------------              12.7   9.91  >-----------<  114      [01-05-24 @ 05:07]              39.1     138  |  97  |  76.8  ----------------------------<  178      [01-05-24 @ 05:07]  3.9   |  29.0  |  1.64        Ca     9.2     [01-05-24 @ 05:07]            Creatinine Trend:  SCr 1.64 [01-05 @ 05:07]  SCr 1.61 [01-04 @ 05:31]  SCr 1.73 [01-03 @ 05:21]  SCr 1.85 [01-02 @ 00:05]  SCr 1.59 [01-01 @ 06:25]    Urinalysis - [01-05-24 @ 05:07]      Color  / Appearance  / SG  / pH       Gluc 178 / Ketone   / Bili  / Urobili        Blood  / Protein  / Leuk Est  / Nitrite       RBC  / WBC  / Hyaline  / Gran  / Sq Epi  / Non Sq Epi  / Bacteria       TSH 1.42      [12-29-23 @ 05:52]  Lipid: chol 140, , HDL 70, LDL --      [12-17-23 @ 08:20]

## 2024-01-05 NOTE — PROGRESS NOTE ADULT - ASSESSMENT
69 yo female with HTN, HLD, DM2, HFpEF, CKD III, DVT, Uterine CA, COPD on home o2 who presented with complaints of shortness of breath. Patient reports that she has been sick all week and was recently started on Vantin and steroids while at the nursing home. Pt admitted for acute on chronic respiratory failure sec to Acute HFpEF exacerbation with likely with Superimposed bacterial Pneumonia in setting of RSV, Severe pulm HTN, new Afib..CT scan which also showed pneumonia and pulmonary HTN. S/P [facility] 12/29 with mild LAD disease,pulm htn. Pt was wean off from BIPAP,now on NC. and was given IV lasix,Started on heparin drip and changed to Xarelto after cath.    New afib RVR    - continue bb 75 mg q8hr(cut down from 150 mg bid for hypotension)  - Xarelto 15 mg qd per renal function  - EP consulted, no DCCV given resp issues  - F/U EP rec  - TTE W or WO Ultrasound Enhancing Agent (12.18.23)EF 60 to 65%. There is moderate (grade 2) left ventricular diastolic dysfunction, with normal filling pressure.  -monitor bp      # Acute on chronic respiratory failure  # Acute HFpEF exacerbation   # Superimposed bacterial Pneumonia in setting of RSV  # Severe pulm HTN  - so2 4 L.home 4 L NC  - CT chest reads worsening middle lobe PNA  - S/p [facility] mild LAD disease/ Severe pulmonary HTN 70 / LVEDP stable  - repeat MRSA PCR negative  - s/p antibiotics stopped on 12/28,   - pt high risk for recurrent aspiration  - repeat blood clx NGTD  - S/P  steroids   - levalbuterol and ipratropium q6hrs, symbicort BID   - Torsemide  - BIPAP qhs  - seen by cardiology/pulm f/u out pt        # JACQUES improve  - likely combination of pre-renal and ATN  - lasix on hold  -cr 1.6- nephrology consulted  - trend BMP    # previous suspicion for PE/ DVT on admission  - Patient has empirically been treated for PE/DVT since August  - No scans noted to have PE/DVT  - duplex lower ext to without dvt   - d dimer negative        #Chronic pain  - continue home Pain meds of Oxycodone 60 mg q12h and Oxycodone 5mg q6h PRN  -cut down oxycodone 30 mg bid as bp soft    # HLD  - Atorvastatin for rosuvastatin    # COPD  -oxygen supplement  - taper off  steroids   - Symbicort, Spiriva, albuterol  - pulm following     #DM  #steroid induced hyperglycemia  -bg high now  -episode of hypoglycemia   -A1C 8.3  - increase lantus 34 u bid  - increase 36 u meal coverage  -lispro sliding scale  -f/u endocrine rec    DVT prophylaxis: Xarelto   Dispo: difficult disposition. Denied by insurance for [facility] peer to peer done. repeat PT eval.sw for safe disposition.  Update son   DANE Pt- understood risk of hypotension from narcotic.pt is adamant to get her pain meds.   dane rn 67 yo female with HTN, HLD, DM2, HFpEF, CKD III, DVT, Uterine CA, COPD on home o2 who presented with complaints of shortness of breath. Patient reports that she has been sick all week and was recently started on Vantin and steroids while at the nursing home. Pt admitted for acute on chronic respiratory failure sec to Acute HFpEF exacerbation with likely with Superimposed bacterial Pneumonia in setting of RSV, Severe pulm HTN, new Afib..CT scan which also showed pneumonia and pulmonary HTN. S/P [facility] 12/29 with mild LAD disease,pulm htn. Pt was wean off from BIPAP,now on NC. and was given IV lasix,Started on heparin drip and changed to Xarelto after cath.    New afib RVR    - continue bb 75 mg q8hr(cut down from 150 mg bid for hypotension)  - Xarelto 15 mg qd per renal function  - EP consulted, no DCCV given resp issues  - F/U EP rec  - TTE W or WO Ultrasound Enhancing Agent (12.18.23)EF 60 to 65%. There is moderate (grade 2) left ventricular diastolic dysfunction, with normal filling pressure.  -monitor bp      # Acute on chronic respiratory failure  # Acute HFpEF exacerbation   # Superimposed bacterial Pneumonia in setting of RSV  # Severe pulm HTN  - so2 4 L.home 4 L NC  - CT chest reads worsening middle lobe PNA  - S/p [facility] mild LAD disease/ Severe pulmonary HTN 69 / LVEDP stable  - repeat MRSA PCR negative  - s/p antibiotics stopped on 12/28,   - pt high risk for recurrent aspiration  - repeat blood clx NGTD  - S/P  steroids   - levalbuterol and ipratropium q6hrs, symbicort BID   - Torsemide  - BIPAP qhs  - seen by cardiology/pulm f/u out pt        # JACQUES improve  - likely combination of pre-renal and ATN  - lasix on hold  -cr 1.6- nephrology consulted  - trend BMP    # previous suspicion for PE/ DVT on admission  - Patient has empirically been treated for PE/DVT since August  - No scans noted to have PE/DVT  - duplex lower ext to without dvt   - d dimer negative        #Chronic pain  - continue home Pain meds of Oxycodone 60 mg q12h and Oxycodone 5mg q6h PRN  -cut down oxycodone 30 mg bid as bp soft    # HLD  - Atorvastatin for rosuvastatin    # COPD  -oxygen supplement  - taper off  steroids   - Symbicort, Spiriva, albuterol  - pulm following     #DM  #steroid induced hyperglycemia  -bg high now  -episode of hypoglycemia   -A1C 8.3  - increase lantus 34 u bid  - increase 36 u meal coverage  -lispro sliding scale  -f/u endocrine rec    DVT prophylaxis: Xarelto   Dispo: difficult disposition. Denied by insurance for [facility] peer to peer done. repeat PT eval.sw for safe disposition.  Update son   DANE Pt- understood risk of hypotension from narcotic.pt is adamant to get her pain meds.   dane rn

## 2024-01-05 NOTE — PROGRESS NOTE ADULT - NUTRITIONAL ASSESSMENT
This patient has been assessed with a concern for Malnutrition and has been determined to have a diagnosis/diagnoses of Severe protein-calorie malnutrition and Morbid obesity (BMI > 40).    This patient is being managed with:   Diet Consistent Carbohydrate w/Evening Snack-  1500mL Fluid Restriction ([id number])  Low Sodium  Entered: Dec 19 2023  6:23PM   This patient has been assessed with a concern for Malnutrition and has been determined to have a diagnosis/diagnoses of Severe protein-calorie malnutrition and Morbid obesity (BMI > 40).    This patient is being managed with:   Diet Consistent Carbohydrate w/Evening Snack-  1500mL Fluid Restriction ([id number])  Low Sodium  Entered: Dec 19 2023  6:23PM

## 2024-01-06 LAB
GLUCOSE BLDC GLUCOMTR-MCNC: 154 MG/DL — HIGH (ref 70–99)
GLUCOSE BLDC GLUCOMTR-MCNC: 154 MG/DL — HIGH (ref 70–99)
GLUCOSE BLDC GLUCOMTR-MCNC: 160 MG/DL — HIGH (ref 70–99)
GLUCOSE BLDC GLUCOMTR-MCNC: 160 MG/DL — HIGH (ref 70–99)
GLUCOSE BLDC GLUCOMTR-MCNC: 204 MG/DL — HIGH (ref 70–99)
GLUCOSE BLDC GLUCOMTR-MCNC: 204 MG/DL — HIGH (ref 70–99)
GLUCOSE BLDC GLUCOMTR-MCNC: 206 MG/DL — HIGH (ref 70–99)
GLUCOSE BLDC GLUCOMTR-MCNC: 206 MG/DL — HIGH (ref 70–99)
GLUCOSE BLDC GLUCOMTR-MCNC: 219 MG/DL — HIGH (ref 70–99)
GLUCOSE BLDC GLUCOMTR-MCNC: 219 MG/DL — HIGH (ref 70–99)
GLUCOSE BLDC GLUCOMTR-MCNC: 224 MG/DL — HIGH (ref 70–99)
GLUCOSE BLDC GLUCOMTR-MCNC: 224 MG/DL — HIGH (ref 70–99)
GLUCOSE BLDC GLUCOMTR-MCNC: 225 MG/DL — HIGH (ref 70–99)
GLUCOSE BLDC GLUCOMTR-MCNC: 225 MG/DL — HIGH (ref 70–99)
GLUCOSE BLDC GLUCOMTR-MCNC: >600 MG/DL — CRITICAL HIGH (ref 70–99)
GLUCOSE BLDC GLUCOMTR-MCNC: >600 MG/DL — CRITICAL HIGH (ref 70–99)

## 2024-01-06 PROCEDURE — 99232 SBSQ HOSP IP/OBS MODERATE 35: CPT

## 2024-01-06 RX ADMIN — OXYCODONE HYDROCHLORIDE 5 MILLIGRAM(S): 5 TABLET ORAL at 21:30

## 2024-01-06 RX ADMIN — Medication 500 MICROGRAM(S): at 16:11

## 2024-01-06 RX ADMIN — Medication 36 UNIT(S): at 17:44

## 2024-01-06 RX ADMIN — Medication 1 APPLICATION(S): at 05:34

## 2024-01-06 RX ADMIN — LEVALBUTEROL 0.63 MILLIGRAM(S): 1.25 SOLUTION, CONCENTRATE RESPIRATORY (INHALATION) at 09:59

## 2024-01-06 RX ADMIN — Medication 20 MILLIGRAM(S): at 05:34

## 2024-01-06 RX ADMIN — Medication 500 MICROGRAM(S): at 02:30

## 2024-01-06 RX ADMIN — RIVAROXABAN 15 MILLIGRAM(S): KIT at 17:44

## 2024-01-06 RX ADMIN — Medication 500 MICROGRAM(S): at 09:59

## 2024-01-06 RX ADMIN — OXYCODONE HYDROCHLORIDE 5 MILLIGRAM(S): 5 TABLET ORAL at 08:41

## 2024-01-06 RX ADMIN — Medication 1 APPLICATION(S): at 17:45

## 2024-01-06 RX ADMIN — LEVALBUTEROL 0.63 MILLIGRAM(S): 1.25 SOLUTION, CONCENTRATE RESPIRATORY (INHALATION) at 16:12

## 2024-01-06 RX ADMIN — OXYCODONE HYDROCHLORIDE 30 MILLIGRAM(S): 5 TABLET ORAL at 05:34

## 2024-01-06 RX ADMIN — NYSTATIN CREAM 1 APPLICATION(S): 100000 CREAM TOPICAL at 05:34

## 2024-01-06 RX ADMIN — Medication 0.25 MILLIGRAM(S): at 21:29

## 2024-01-06 RX ADMIN — LEVALBUTEROL 0.63 MILLIGRAM(S): 1.25 SOLUTION, CONCENTRATE RESPIRATORY (INHALATION) at 02:30

## 2024-01-06 RX ADMIN — Medication 4: at 17:44

## 2024-01-06 RX ADMIN — LIDOCAINE 1 PATCH: 4 CREAM TOPICAL at 12:57

## 2024-01-06 RX ADMIN — INSULIN GLARGINE 34 UNIT(S): 100 INJECTION, SOLUTION SUBCUTANEOUS at 21:29

## 2024-01-06 RX ADMIN — INSULIN GLARGINE 34 UNIT(S): 100 INJECTION, SOLUTION SUBCUTANEOUS at 08:41

## 2024-01-06 RX ADMIN — Medication 36 UNIT(S): at 12:55

## 2024-01-06 RX ADMIN — Medication 500 MICROGRAM(S): at 20:52

## 2024-01-06 RX ADMIN — Medication 75 MILLIGRAM(S): at 12:56

## 2024-01-06 RX ADMIN — Medication 4 MILLILITER(S): at 09:59

## 2024-01-06 RX ADMIN — OXYCODONE HYDROCHLORIDE 30 MILLIGRAM(S): 5 TABLET ORAL at 17:45

## 2024-01-06 RX ADMIN — LEVALBUTEROL 0.63 MILLIGRAM(S): 1.25 SOLUTION, CONCENTRATE RESPIRATORY (INHALATION) at 20:52

## 2024-01-06 RX ADMIN — Medication 2: at 12:55

## 2024-01-06 RX ADMIN — BUDESONIDE AND FORMOTEROL FUMARATE DIHYDRATE 2 PUFF(S): 160; 4.5 AEROSOL RESPIRATORY (INHALATION) at 20:53

## 2024-01-06 RX ADMIN — CHLORHEXIDINE GLUCONATE 1 APPLICATION(S): 213 SOLUTION TOPICAL at 05:33

## 2024-01-06 RX ADMIN — BUDESONIDE AND FORMOTEROL FUMARATE DIHYDRATE 2 PUFF(S): 160; 4.5 AEROSOL RESPIRATORY (INHALATION) at 09:59

## 2024-01-06 RX ADMIN — OXYCODONE HYDROCHLORIDE 5 MILLIGRAM(S): 5 TABLET ORAL at 03:01

## 2024-01-06 RX ADMIN — NYSTATIN CREAM 1 APPLICATION(S): 100000 CREAM TOPICAL at 17:44

## 2024-01-06 RX ADMIN — Medication 4: at 08:43

## 2024-01-06 RX ADMIN — FAMOTIDINE 20 MILLIGRAM(S): 10 INJECTION INTRAVENOUS at 12:56

## 2024-01-06 RX ADMIN — Medication 0.25 MILLIGRAM(S): at 14:03

## 2024-01-06 RX ADMIN — ATORVASTATIN CALCIUM 80 MILLIGRAM(S): 80 TABLET, FILM COATED ORAL at 21:30

## 2024-01-06 RX ADMIN — SENNA PLUS 2 TABLET(S): 8.6 TABLET ORAL at 21:30

## 2024-01-06 RX ADMIN — Medication 75 MILLIGRAM(S): at 21:30

## 2024-01-06 RX ADMIN — LIDOCAINE 1 PATCH: 4 CREAM TOPICAL at 22:33

## 2024-01-06 RX ADMIN — Medication 36 UNIT(S): at 08:51

## 2024-01-06 NOTE — PROGRESS NOTE ADULT - NUTRITIONAL ASSESSMENT
This patient has been assessed with a concern for Malnutrition and has been determined to have a diagnosis/diagnoses of Severe protein-calorie malnutrition and Morbid obesity (BMI > 40).    This patient is being managed with:   Diet Consistent Carbohydrate w/Evening Snack-  1500mL Fluid Restriction ([id number])  Low Sodium  Entered: Dec 19 2023  6:23PM   This patient has been assessed with a concern for Malnutrition and has been determined to have a diagnosis/diagnoses of Severe protein-calorie malnutrition and Morbid obesity (BMI > 40).    This patient is being managed with:   Diet Consistent Carbohydrate w/Evening Snack-  1500mL Fluid Restriction ([id number])  Low Sodium  Entered: Dec 19 2023  6:23PM

## 2024-01-06 NOTE — PROGRESS NOTE ADULT - SUBJECTIVE AND OBJECTIVE BOX
AGUEDA LUIS    [id number]    68y      Female    CC: sob     INTERVAL HPI/OVERNIGHT EVENTS: Pt seen and examined. no acute events reported o/n     REVIEW OF SYSTEMS:    CONSTITUTIONAL: No weight loss  RESPIRATORY: No wheezing, hemoptysis  CARDIOVASCULAR: No chest pain, palpitations  GASTROINTESTINAL: No abdominal or epigastric pain. No nausea, vomiting    Vital Signs Last 24 Hrs  T(C): 36.6 (06 Jan 2024 05:19), Max: 36.9 (05 Jan 2024 16:47)  T(F): 97.9 (06 Jan 2024 05:19), Max: 98.5 (05 Jan 2024 16:47)  HR: 102 (06 Jan 2024 10:00) (72 - 113)  BP: 103/60 (06 Jan 2024 08:32) (93/59 - 116/78)  BP(mean): --  RR: 18 (06 Jan 2024 08:32) (18 - 18)  SpO2: 95% (06 Jan 2024 10:00) (95% - 100%)    Parameters below as of 06 Jan 2024 10:00  Patient On (Oxygen Delivery Method): nasal cannula        PHYSICAL EXAM:    GENERAL: NAD  CHEST/LUNG: decreased sounds b/l; respirations unlabored on 4LNC   HEART: S1S2+, Regular rate and rhythm  ABDOMEN: Soft, Nontender, Nondistended; Bowel sounds present  SKIN: warm, dry   NEURO: Awake, alert;   PSYCH: calm, cooperative     LABS:                        12.7   9.91  )-----------( 114      ( 05 Jan 2024 05:07 )             39.1     01-05    138  |  97  |  76.8<H>  ----------------------------<  178<H>  3.9   |  29.0  |  1.64<H>    Ca    9.2      05 Jan 2024 05:07        Urinalysis Basic - ( 05 Jan 2024 05:07 )    Color: x / Appearance: x / SG: x / pH: x  Gluc: 178 mg/dL / Ketone: x  / Bili: x / Urobili: x   Blood: x / Protein: x / Nitrite: x   Leuk Esterase: x / RBC: x / WBC x   Sq Epi: x / Non Sq Epi: x / Bacteria: x          MEDICATIONS  (STANDING):  acetylcysteine 20%  Inhalation 4 milliLiter(s) Inhalation daily  atorvastatin 80 milliGRAM(s) Oral at bedtime  budesonide  80 MICROgram(s)/formoterol 4.5 MICROgram(s) Inhaler 2 Puff(s) Inhalation two times a day  chlorhexidine 2% Cloths 1 Application(s) Topical <User Schedule>  clotrimazole 1% Cream 1 Application(s) Topical two times a day  dextrose 5%. 1000 milliLiter(s) (50 mL/Hr) IV Continuous <Continuous>  dextrose 5%. 1000 milliLiter(s) (100 mL/Hr) IV Continuous <Continuous>  dextrose 50% Injectable 25 Gram(s) IV Push once  dextrose 50% Injectable 12.5 Gram(s) IV Push once  dextrose 50% Injectable 25 Gram(s) IV Push once  famotidine    Tablet 20 milliGRAM(s) Oral daily  glucagon  Injectable 1 milliGRAM(s) IntraMuscular once  insulin glargine Injectable (LANTUS) 34 Unit(s) SubCutaneous every morning  insulin glargine Injectable (LANTUS) 34 Unit(s) SubCutaneous at bedtime  insulin lispro (ADMELOG) corrective regimen sliding scale   SubCutaneous three times a day before meals  insulin lispro Injectable (ADMELOG) 36 Unit(s) SubCutaneous three times a day before meals  ipratropium    for Nebulization 500 MICROGram(s) Nebulizer every 6 hours  lactulose Syrup 20 Gram(s) Oral daily  levalbuterol Inhalation 0.63 milliGRAM(s) Inhalation every 6 hours  lidocaine   4% Patch 1 Patch Transdermal daily  metoprolol tartrate 75 milliGRAM(s) Oral every 8 hours  nystatin Powder 1 Application(s) Topical two times a day  oxyCODONE  ER Tablet 30 milliGRAM(s) Oral every 12 hours  polyethylene glycol 3350 17 Gram(s) Oral daily  rivaroxaban 15 milliGRAM(s) Oral daily  senna 2 Tablet(s) Oral at bedtime  torsemide 20 milliGRAM(s) Oral daily    MEDICATIONS  (PRN):  acetaminophen     Tablet .. 650 milliGRAM(s) Oral every 6 hours PRN Temp greater or equal to 38C (100.4F), Mild Pain (1 - 3)  ALPRAZolam 0.25 milliGRAM(s) Oral every 8 hours PRN for anxiety/Sleep  aluminum hydroxide/magnesium hydroxide/simethicone Suspension 30 milliLiter(s) Oral once PRN Dyspepsia  bisacodyl Suppository 10 milliGRAM(s) Rectal daily PRN Constipation  dextrose Oral Gel 15 Gram(s) Oral once PRN Blood Glucose LESS THAN 70 milliGRAM(s)/deciliter  diphenhydrAMINE 25 milliGRAM(s) Oral every 6 hours PRN Rash and/or Itching  hydrALAZINE Injectable 10 milliGRAM(s) IV Push every 4 hours PRN for systolic bp > 160  melatonin 3 milliGRAM(s) Oral at bedtime PRN Insomnia  metoprolol tartrate Injectable 5 milliGRAM(s) IV Push every 6 hours PRN heart rate sustaining greater than 130  ondansetron Injectable 4 milliGRAM(s) IV Push every 8 hours PRN Nausea and/or Vomiting  oxyCODONE    IR 5 milliGRAM(s) Oral every 6 hours PRN Moderate Pain (4 - 6)      RADIOLOGY & ADDITIONAL TESTS:   AGUEDA LUIS    [id number]    68y      Female    CC: sob     INTERVAL HPI/OVERNIGHT EVENTS: Pt seen and examined. no acute events reported o/n     REVIEW OF SYSTEMS:    CONSTITUTIONAL: No weight loss  RESPIRATORY: No wheezing, hemoptysis  CARDIOVASCULAR: No chest pain, palpitations  GASTROINTESTINAL: No abdominal or epigastric pain. No nausea, vomiting    Vital Signs Last 24 Hrs  T(C): 36.6 (06 Jan 2024 05:19), Max: 36.9 (05 Jan 2024 16:47)  T(F): 97.9 (06 Jan 2024 05:19), Max: 98.5 (05 Jan 2024 16:47)  HR: 102 (06 Jan 2024 10:00) (72 - 113)  BP: 103/60 (06 Jan 2024 08:32) (93/59 - 116/78)  BP(mean): --  RR: 18 (06 Jan 2024 08:32) (18 - 18)  SpO2: 95% (06 Jan 2024 10:00) (95% - 100%)    Parameters below as of 06 Jan 2024 10:00  Patient On (Oxygen Delivery Method): nasal cannula        PHYSICAL EXAM:    GENERAL: NAD  CHEST/LUNG: decreased sounds b/l; respirations unlabored on 4LNC   HEART: S1S2+, Regular rate and rhythm  ABDOMEN: Soft, Nontender, Nondistended; Bowel sounds present  SKIN: warm, dry   NEURO: Awake, alert;   PSYCH: calm, cooperative     LABS:                        12.7   9.91  )-----------( 114      ( 05 Jan 2024 05:07 )             39.1     01-05    138  |  97  |  76.8<H>  ----------------------------<  178<H>  3.9   |  29.0  |  1.64<H>    Ca    9.2      05 Jan 2024 05:07        Urinalysis Basic - ( 05 Jan 2024 05:07 )    Color: x / Appearance: x / SG: x / pH: x  Gluc: 178 mg/dL / Ketone: x  / Bili: x / Urobili: x   Blood: x / Protein: x / Nitrite: x   Leuk Esterase: x / RBC: x / WBC x   Sq Epi: x / Non Sq Epi: x / Bacteria: x          MEDICATIONS  (STANDING):  acetylcysteine 20%  Inhalation 4 milliLiter(s) Inhalation daily  atorvastatin 80 milliGRAM(s) Oral at bedtime  budesonide  80 MICROgram(s)/formoterol 4.5 MICROgram(s) Inhaler 2 Puff(s) Inhalation two times a day  chlorhexidine 2% Cloths 1 Application(s) Topical <User Schedule>  clotrimazole 1% Cream 1 Application(s) Topical two times a day  dextrose 5%. 1000 milliLiter(s) (50 mL/Hr) IV Continuous <Continuous>  dextrose 5%. 1000 milliLiter(s) (100 mL/Hr) IV Continuous <Continuous>  dextrose 50% Injectable 25 Gram(s) IV Push once  dextrose 50% Injectable 12.5 Gram(s) IV Push once  dextrose 50% Injectable 25 Gram(s) IV Push once  famotidine    Tablet 20 milliGRAM(s) Oral daily  glucagon  Injectable 1 milliGRAM(s) IntraMuscular once  insulin glargine Injectable (LANTUS) 34 Unit(s) SubCutaneous every morning  insulin glargine Injectable (LANTUS) 34 Unit(s) SubCutaneous at bedtime  insulin lispro (ADMELOG) corrective regimen sliding scale   SubCutaneous three times a day before meals  insulin lispro Injectable (ADMELOG) 36 Unit(s) SubCutaneous three times a day before meals  ipratropium    for Nebulization 500 MICROGram(s) Nebulizer every 6 hours  lactulose Syrup 20 Gram(s) Oral daily  levalbuterol Inhalation 0.63 milliGRAM(s) Inhalation every 6 hours  lidocaine   4% Patch 1 Patch Transdermal daily  metoprolol tartrate 75 milliGRAM(s) Oral every 8 hours  nystatin Powder 1 Application(s) Topical two times a day  oxyCODONE  ER Tablet 30 milliGRAM(s) Oral every 12 hours  polyethylene glycol 3350 17 Gram(s) Oral daily  rivaroxaban 15 milliGRAM(s) Oral daily  senna 2 Tablet(s) Oral at bedtime  torsemide 20 milliGRAM(s) Oral daily    MEDICATIONS  (PRN):  acetaminophen     Tablet .. 650 milliGRAM(s) Oral every 6 hours PRN Temp greater or equal to 38C (100.4F), Mild Pain (1 - 3)  ALPRAZolam 0.25 milliGRAM(s) Oral every 8 hours PRN for anxiety/Sleep  aluminum hydroxide/magnesium hydroxide/simethicone Suspension 30 milliLiter(s) Oral once PRN Dyspepsia  bisacodyl Suppository 10 milliGRAM(s) Rectal daily PRN Constipation  dextrose Oral Gel 15 Gram(s) Oral once PRN Blood Glucose LESS THAN 70 milliGRAM(s)/deciliter  diphenhydrAMINE 25 milliGRAM(s) Oral every 6 hours PRN Rash and/or Itching  hydrALAZINE Injectable 10 milliGRAM(s) IV Push every 4 hours PRN for systolic bp > 160  melatonin 3 milliGRAM(s) Oral at bedtime PRN Insomnia  metoprolol tartrate Injectable 5 milliGRAM(s) IV Push every 6 hours PRN heart rate sustaining greater than 130  ondansetron Injectable 4 milliGRAM(s) IV Push every 8 hours PRN Nausea and/or Vomiting  oxyCODONE    IR 5 milliGRAM(s) Oral every 6 hours PRN Moderate Pain (4 - 6)      RADIOLOGY & ADDITIONAL TESTS:

## 2024-01-06 NOTE — CHART NOTE - NSCHARTNOTEFT_GEN_A_CORE
Source: Patient [x ]  Family [ ]   other [x ] EMR and staff    Current Diet: Diet, Consistent Carbohydrate w/Evening Snack:   1500mL Fluid Restriction ([id number])  Low Sodium (12-19-23 @ 18:24)      Patient reports [x] nausea  [ ] vomiting [ ] diarrhea [x] constipation  [ ]chewing problems [ ] swallowing issues  [ ] other:     PO intake:  < 50% [ ]   50-75%  [ ]   %  [x ]  other :    Source for PO intake [ x] Patient [ ] family [ x] chart [x ] staff [ ] other    Current Weight:   12/20- 389.9 pounds  Edema 1+ generalized  Edema 2+ left foot, right foot    Pertinent Medications: MEDICATIONS  (STANDING):  atorvastatin 80 milliGRAM(s) Oral at bedtime  glucagon  Injectable 1 milliGRAM(s) IntraMuscular once  insulin glargine Injectable (LANTUS) 34 Unit(s) SubCutaneous every morning  insulin glargine Injectable (LANTUS) 34 Unit(s) SubCutaneous at bedtime  insulin lispro (ADMELOG) corrective regimen sliding scale   SubCutaneous three times a day before meals  insulin lispro Injectable (ADMELOG) 36 Unit(s) SubCutaneous three times a day before meals  lactulose Syrup 20 Gram(s) Oral daily  metoprolol tartrate 75 milliGRAM(s) Oral every 8 hours  polyethylene glycol 3350 17 Gram(s) Oral daily  rivaroxaban 15 milliGRAM(s) Oral daily  senna 2 Tablet(s) Oral at bedtime      MEDICATIONS  (PRN):  acetaminophen     Tablet .. 650 milliGRAM(s) Oral every 6 hours PRN Temp greater or equal to 38C (100.4F), Mild Pain (1 - 3)  bisacodyl Suppository 10 milliGRAM(s) Rectal daily PRN Constipation  dextrose Oral Gel 15 Gram(s) Oral once PRN Blood Glucose LESS THAN 70 milliGRAM(s)/deciliter  hydrALAZINE Injectable 10 milliGRAM(s) IV Push every 4 hours PRN for systolic bp > 160  ondansetron Injectable 4 milliGRAM(s) IV Push every 8 hours PRN Nausea and/or Vomiting      Pertinent Labs: CBC Full  -  ( 05 Jan 2024 05:07 )  WBC Count : 9.91 K/uL  RBC Count : 4.24 M/uL  Hemoglobin : 12.7 g/dL  Hematocrit : 39.1 %  Platelet Count - Automated : 114 K/uL  Mean Cell Volume : 92.2 fl  Mean Cell Hemoglobin : 30.0 pg  Mean Cell Hemoglobin Concentration : 32.5 gm/dL    Skin:  Surgical incision to R radial    Estimated Needs:   [ x] no change since previous assessment  [ ] recalculated:     Hospital Course: 69 yo female with HTN, HLD, DM2, HFpEF, CKD III, DVT, Uterine CA, COPD on home o2 who presented with complaints of shortness of breath. Patient reports that she has been sick all week and was recently started on Vantin and steroids while at the nursing home. Patient reports that her dyspnea just worsened and she told the nursing home to send her in for an evaluation. While in the ED, patient was placed on BIPAP and was given IV lasix with some improvement. RVP then results as RSV +. Patient had a CT scan which also showed pneumonia and pulmonary HTN. Admission to medicine was requested for further management.    Current Nutrition Diagnosis: Pt remains at risk due to newly identified malnutrition. Malnutrition (acute, severe) related to inability to meet sufficient protein-energy needs in setting of CHF, COPD, and frequent hospitalizations as evidenced by weight loss <50%EER for >5days,  >7.5% x3 months, and edema +2,+3. Pt reports variable PO intake <% at meals with overall increase in appetite. Pt endorses many food preferences, so discussed the option of menu assist with meals. Pt endorses nausea and constipation. Last BM reported 1/5, but pt reports constipation for 4 days prior. Discussed high fiber foods and the addition of yogurt to breakfast to assist along with current bowel regimen. RD to remain available.          Recommendations:   1) Rx: MVI daily, OsCal supplementation.   2) Encourage po intake, monitor diet tolerance, and provide assistance with meals as needed.  3) Obtain daily weights to monitor trends.   4) Continue bowel regimen PRN.    Monitoring and Evaluation:   [x] PO intake [x ] Tolerance to diet prescription [X] Weights  [X] Follow up per protocol [X] Labs: chem 8, mg, phos, H/H, BGM Source: Patient [x ]  Family [ ]   other [x ] EMR and staff    Current Diet: Diet, Consistent Carbohydrate w/Evening Snack:   1500mL Fluid Restriction ([id number])  Low Sodium (12-19-23 @ 18:24)      Patient reports [x] nausea  [ ] vomiting [ ] diarrhea [x] constipation  [ ]chewing problems [ ] swallowing issues  [ ] other:     PO intake:  < 50% [ ]   50-75%  [ ]   %  [x ]  other :    Source for PO intake [ x] Patient [ ] family [ x] chart [x ] staff [ ] other    Current Weight:   12/20- 389.9 pounds  Edema 1+ generalized  Edema 2+ left foot, right foot    Pertinent Medications: MEDICATIONS  (STANDING):  atorvastatin 80 milliGRAM(s) Oral at bedtime  glucagon  Injectable 1 milliGRAM(s) IntraMuscular once  insulin glargine Injectable (LANTUS) 34 Unit(s) SubCutaneous every morning  insulin glargine Injectable (LANTUS) 34 Unit(s) SubCutaneous at bedtime  insulin lispro (ADMELOG) corrective regimen sliding scale   SubCutaneous three times a day before meals  insulin lispro Injectable (ADMELOG) 36 Unit(s) SubCutaneous three times a day before meals  lactulose Syrup 20 Gram(s) Oral daily  metoprolol tartrate 75 milliGRAM(s) Oral every 8 hours  polyethylene glycol 3350 17 Gram(s) Oral daily  rivaroxaban 15 milliGRAM(s) Oral daily  senna 2 Tablet(s) Oral at bedtime      MEDICATIONS  (PRN):  acetaminophen     Tablet .. 650 milliGRAM(s) Oral every 6 hours PRN Temp greater or equal to 38C (100.4F), Mild Pain (1 - 3)  bisacodyl Suppository 10 milliGRAM(s) Rectal daily PRN Constipation  dextrose Oral Gel 15 Gram(s) Oral once PRN Blood Glucose LESS THAN 70 milliGRAM(s)/deciliter  hydrALAZINE Injectable 10 milliGRAM(s) IV Push every 4 hours PRN for systolic bp > 160  ondansetron Injectable 4 milliGRAM(s) IV Push every 8 hours PRN Nausea and/or Vomiting      Pertinent Labs: CBC Full  -  ( 05 Jan 2024 05:07 )  WBC Count : 9.91 K/uL  RBC Count : 4.24 M/uL  Hemoglobin : 12.7 g/dL  Hematocrit : 39.1 %  Platelet Count - Automated : 114 K/uL  Mean Cell Volume : 92.2 fl  Mean Cell Hemoglobin : 30.0 pg  Mean Cell Hemoglobin Concentration : 32.5 gm/dL    Skin:  Surgical incision to R radial    Estimated Needs:   [ x] no change since previous assessment  [ ] recalculated:     Hospital Course: 67 yo female with HTN, HLD, DM2, HFpEF, CKD III, DVT, Uterine CA, COPD on home o2 who presented with complaints of shortness of breath. Patient reports that she has been sick all week and was recently started on Vantin and steroids while at the nursing home. Patient reports that her dyspnea just worsened and she told the nursing home to send her in for an evaluation. While in the ED, patient was placed on BIPAP and was given IV lasix with some improvement. RVP then results as RSV +. Patient had a CT scan which also showed pneumonia and pulmonary HTN. Admission to medicine was requested for further management.    Current Nutrition Diagnosis: Pt remains at risk due to newly identified malnutrition. Malnutrition (acute, severe) related to inability to meet sufficient protein-energy needs in setting of CHF, COPD, and frequent hospitalizations as evidenced by weight loss <50%EER for >5days,  >7.5% x3 months, and edema +2,+3. Pt reports variable PO intake <% at meals with overall increase in appetite. Pt endorses many food preferences, so discussed the option of menu assist with meals. Pt endorses nausea and constipation. Last BM reported 1/5, but pt reports constipation for 4 days prior. Discussed high fiber foods and the addition of yogurt to breakfast to assist along with current bowel regimen. RD to remain available.          Recommendations:   1) Rx: MVI daily, OsCal supplementation.   2) Encourage po intake, monitor diet tolerance, and provide assistance with meals as needed.  3) Obtain daily weights to monitor trends.   4) Continue bowel regimen PRN.    Monitoring and Evaluation:   [x] PO intake [x ] Tolerance to diet prescription [X] Weights  [X] Follow up per protocol [X] Labs: chem 8, mg, phos, H/H, BGM

## 2024-01-06 NOTE — CHART NOTE - NSCHARTNOTESELECT_GEN_ALL_CORE
Event Note
Nutrition Services
Nutrition Services
Vascular Surgery/Event Note
Cardiology/Event Note
Event Note
Nutrition Services

## 2024-01-06 NOTE — PROGRESS NOTE ADULT - ASSESSMENT
68y/oF PMH HTN, HLD, DM2, HFpEF, CKD3, DVT, Uterine CA, COPD on home O2 presenting with SOB from SNF. Had been started on vantin and steroids prior to admission. Pt admitted with acute on chronic respiratory failure 2/2 acute HFpEF exacerbation w/likely superimposed bacterial pna in setting of RSV, severe pHTN, new afib. CT imaging also with pna. now s/p [facility] 12/29 with mild LAD disease, pHTN. Pt weaned off bipap to NC. s/p heparin gtt, on xarelto     Acute on chronic hypoxic respiratory failure   Acute on chronic diastolic HFpEF   Severe pHTN   RML pna, viral 2/2 RSV and likely superimposed gram negative pna   New onset afib w/ RVR   -cont metoprolol 75mg q8h  -prn IV lopressor for sustained HR >130  -EP recs appreciated   -no DCCV given respiratory status   -TTE: LVEF 60-65%, grade 2 ddx     68y/oF PMH HTN, HLD, DM2, HFpEF, CKD3, DVT, Uterine CA, COPD on home O2 presenting with SOB from SNF. Had been started on vantin and steroids prior to admission. Pt admitted with acute on chronic respiratory failure 2/2 acute HFpEF exacerbation w/likely superimposed bacterial pna in setting of RSV, severe pHTN, new afib. CT imaging also with pna. now s/p [facility] 12/29 with mild LAD disease, pHTN. Pt weaned off bipap to NC. s/p heparin gtt, on xarelto     Acute on chronic hypoxic respiratory failure   Acute on chronic diastolic HFpEF   Severe pHTN   RML pna, viral 2/2 RSV and likely superimposed gram negative pna   New onset afib w/ RVR   -CT chest reviewed   -s/p [facility] with mild LAD dx, severe pHTN  -s/p course abx   -bcx neg   -s/p steroids   -cont inhalers, nebs  -cont torsemide   -cont nocturnal bipap   -cardio recs appreciated   -pulm recs appreciated   -cont metoprolol 75mg q8h  -prn IV lopressor for sustained HR >130  -EP recs appreciated   -no DCCV given respiratory status   -TTE: LVEF 60-65%, grade 2 ddx    JACQUES on CKD   -nephro recs appreciated   -f/u am bmp     Hx DVT   -on empiric tx for PE/DVT since august   -LE duplex neg for dvt   -d-dimer wnl  -cont xarelto     chronic pain   -cont pain control, bowel regimen     HLD  -cont statin     COPD   -s/p steroids   -cont inhalers   -pulm recs appreciated     DM  -hgba1c 8.3   -likely exacerbated by steroids   -cont lantus, premeal, iss   -endocrine recs appreciated       vte ppx: xarelto     dispo: [facility] p2p done, SW following  68y/oF PMH HTN, HLD, DM2, HFpEF, CKD3, DVT, Uterine CA, COPD on home O2 presenting with SOB from SNF. Had been started on vantin and steroids prior to admission. Pt admitted with acute on chronic respiratory failure 2/2 acute HFpEF exacerbation w/likely superimposed bacterial pna in setting of RSV, severe pHTN, new afib. CT imaging also with pna. now s/p [facility] 12/29 with mild LAD disease, pHTN. Pt weaned off bipap to NC. s/p heparin gtt, on xarelto     Acute on chronic hypoxic respiratory failure   Acute on chronic diastolic HFpEF   Severe pHTN   RML pna, viral 2/2 RSV and likely superimposed gram negative pna   New onset afib w/ RVR   -CT chest reviewed   -s/p [facility] with mild LAD dx, severe pHTN  -s/p course abx   -bcx neg   -s/p steroids   -cont inhalers, nebs  -cont torsemide   -cont nocturnal bipap   -cardio recs appreciated   -pulm recs appreciated   -cont metoprolol 75mg q8h  -prn IV lopressor for sustained HR >130  -EP recs appreciated   -no DCCV given respiratory status   -TTE: LVEF 60-65%, grade 2 ddx    JACQUES on CKD   -nephro recs appreciated   -f/u am bmp     Hx DVT   -on empiric tx for PE/DVT since august   -LE duplex neg for dvt   -d-dimer wnl  -cont xarelto     chronic pain   -cont pain control, bowel regimen     HLD  -cont statin     COPD   -s/p steroids   -cont inhalers   -pulm recs appreciated     DM  -hgba1c 8.3   -likely exacerbated by steroids   -cont lantus, premeal, iss   -endocrine recs appreciated       vte ppx: xarelto     dispo: [facility] p2p done, SW following

## 2024-01-07 LAB
ANION GAP SERPL CALC-SCNC: 15 MMOL/L — SIGNIFICANT CHANGE UP (ref 5–17)
ANION GAP SERPL CALC-SCNC: 15 MMOL/L — SIGNIFICANT CHANGE UP (ref 5–17)
BUN SERPL-MCNC: 71.4 MG/DL — HIGH (ref 8–20)
BUN SERPL-MCNC: 71.4 MG/DL — HIGH (ref 8–20)
CALCIUM SERPL-MCNC: 8.9 MG/DL — SIGNIFICANT CHANGE UP (ref 8.4–10.5)
CALCIUM SERPL-MCNC: 8.9 MG/DL — SIGNIFICANT CHANGE UP (ref 8.4–10.5)
CHLORIDE SERPL-SCNC: 97 MMOL/L — SIGNIFICANT CHANGE UP (ref 96–108)
CHLORIDE SERPL-SCNC: 97 MMOL/L — SIGNIFICANT CHANGE UP (ref 96–108)
CO2 SERPL-SCNC: 26 MMOL/L — SIGNIFICANT CHANGE UP (ref 22–29)
CO2 SERPL-SCNC: 26 MMOL/L — SIGNIFICANT CHANGE UP (ref 22–29)
CREAT SERPL-MCNC: 1.55 MG/DL — HIGH (ref 0.5–1.3)
CREAT SERPL-MCNC: 1.55 MG/DL — HIGH (ref 0.5–1.3)
EGFR: 36 ML/MIN/1.73M2 — LOW
EGFR: 36 ML/MIN/1.73M2 — LOW
GLUCOSE BLDC GLUCOMTR-MCNC: 145 MG/DL — HIGH (ref 70–99)
GLUCOSE BLDC GLUCOMTR-MCNC: 145 MG/DL — HIGH (ref 70–99)
GLUCOSE BLDC GLUCOMTR-MCNC: 161 MG/DL — HIGH (ref 70–99)
GLUCOSE BLDC GLUCOMTR-MCNC: 161 MG/DL — HIGH (ref 70–99)
GLUCOSE BLDC GLUCOMTR-MCNC: 184 MG/DL — HIGH (ref 70–99)
GLUCOSE BLDC GLUCOMTR-MCNC: 184 MG/DL — HIGH (ref 70–99)
GLUCOSE BLDC GLUCOMTR-MCNC: 232 MG/DL — HIGH (ref 70–99)
GLUCOSE BLDC GLUCOMTR-MCNC: 232 MG/DL — HIGH (ref 70–99)
GLUCOSE SERPL-MCNC: 181 MG/DL — HIGH (ref 70–99)
GLUCOSE SERPL-MCNC: 181 MG/DL — HIGH (ref 70–99)
HCT VFR BLD CALC: 34.2 % — LOW (ref 34.5–45)
HCT VFR BLD CALC: 34.2 % — LOW (ref 34.5–45)
HCT VFR BLD CALC: 34.9 % — SIGNIFICANT CHANGE UP (ref 34.5–45)
HCT VFR BLD CALC: 34.9 % — SIGNIFICANT CHANGE UP (ref 34.5–45)
HGB BLD-MCNC: 11 G/DL — LOW (ref 11.5–15.5)
HGB BLD-MCNC: 11 G/DL — LOW (ref 11.5–15.5)
HGB BLD-MCNC: 11.4 G/DL — LOW (ref 11.5–15.5)
HGB BLD-MCNC: 11.4 G/DL — LOW (ref 11.5–15.5)
MAGNESIUM SERPL-MCNC: 1.9 MG/DL — SIGNIFICANT CHANGE UP (ref 1.6–2.6)
MAGNESIUM SERPL-MCNC: 1.9 MG/DL — SIGNIFICANT CHANGE UP (ref 1.6–2.6)
MCHC RBC-ENTMCNC: 29.2 PG — SIGNIFICANT CHANGE UP (ref 27–34)
MCHC RBC-ENTMCNC: 29.2 PG — SIGNIFICANT CHANGE UP (ref 27–34)
MCHC RBC-ENTMCNC: 30.5 PG — SIGNIFICANT CHANGE UP (ref 27–34)
MCHC RBC-ENTMCNC: 30.5 PG — SIGNIFICANT CHANGE UP (ref 27–34)
MCHC RBC-ENTMCNC: 31.5 GM/DL — LOW (ref 32–36)
MCHC RBC-ENTMCNC: 31.5 GM/DL — LOW (ref 32–36)
MCHC RBC-ENTMCNC: 33.3 GM/DL — SIGNIFICANT CHANGE UP (ref 32–36)
MCHC RBC-ENTMCNC: 33.3 GM/DL — SIGNIFICANT CHANGE UP (ref 32–36)
MCV RBC AUTO: 91.4 FL — SIGNIFICANT CHANGE UP (ref 80–100)
MCV RBC AUTO: 91.4 FL — SIGNIFICANT CHANGE UP (ref 80–100)
MCV RBC AUTO: 92.6 FL — SIGNIFICANT CHANGE UP (ref 80–100)
MCV RBC AUTO: 92.6 FL — SIGNIFICANT CHANGE UP (ref 80–100)
PHOSPHATE SERPL-MCNC: 3.2 MG/DL — SIGNIFICANT CHANGE UP (ref 2.4–4.7)
PHOSPHATE SERPL-MCNC: 3.2 MG/DL — SIGNIFICANT CHANGE UP (ref 2.4–4.7)
PLATELET # BLD AUTO: 104 K/UL — LOW (ref 150–400)
PLATELET # BLD AUTO: 104 K/UL — LOW (ref 150–400)
PLATELET # BLD AUTO: 109 K/UL — LOW (ref 150–400)
PLATELET # BLD AUTO: 109 K/UL — LOW (ref 150–400)
POTASSIUM SERPL-MCNC: 3.2 MMOL/L — LOW (ref 3.5–5.3)
POTASSIUM SERPL-MCNC: 3.2 MMOL/L — LOW (ref 3.5–5.3)
POTASSIUM SERPL-SCNC: 3.2 MMOL/L — LOW (ref 3.5–5.3)
POTASSIUM SERPL-SCNC: 3.2 MMOL/L — LOW (ref 3.5–5.3)
RBC # BLD: 3.74 M/UL — LOW (ref 3.8–5.2)
RBC # BLD: 3.74 M/UL — LOW (ref 3.8–5.2)
RBC # BLD: 3.77 M/UL — LOW (ref 3.8–5.2)
RBC # BLD: 3.77 M/UL — LOW (ref 3.8–5.2)
RBC # FLD: 13.9 % — SIGNIFICANT CHANGE UP (ref 10.3–14.5)
RBC # FLD: 13.9 % — SIGNIFICANT CHANGE UP (ref 10.3–14.5)
RBC # FLD: 14 % — SIGNIFICANT CHANGE UP (ref 10.3–14.5)
RBC # FLD: 14 % — SIGNIFICANT CHANGE UP (ref 10.3–14.5)
SODIUM SERPL-SCNC: 138 MMOL/L — SIGNIFICANT CHANGE UP (ref 135–145)
SODIUM SERPL-SCNC: 138 MMOL/L — SIGNIFICANT CHANGE UP (ref 135–145)
WBC # BLD: 8.67 K/UL — SIGNIFICANT CHANGE UP (ref 3.8–10.5)
WBC # BLD: 8.67 K/UL — SIGNIFICANT CHANGE UP (ref 3.8–10.5)
WBC # BLD: 9.07 K/UL — SIGNIFICANT CHANGE UP (ref 3.8–10.5)
WBC # BLD: 9.07 K/UL — SIGNIFICANT CHANGE UP (ref 3.8–10.5)
WBC # FLD AUTO: 8.67 K/UL — SIGNIFICANT CHANGE UP (ref 3.8–10.5)
WBC # FLD AUTO: 8.67 K/UL — SIGNIFICANT CHANGE UP (ref 3.8–10.5)
WBC # FLD AUTO: 9.07 K/UL — SIGNIFICANT CHANGE UP (ref 3.8–10.5)
WBC # FLD AUTO: 9.07 K/UL — SIGNIFICANT CHANGE UP (ref 3.8–10.5)

## 2024-01-07 PROCEDURE — 99232 SBSQ HOSP IP/OBS MODERATE 35: CPT

## 2024-01-07 RX ORDER — POTASSIUM CHLORIDE 20 MEQ
40 PACKET (EA) ORAL ONCE
Refills: 0 | Status: DISCONTINUED | OUTPATIENT
Start: 2024-01-07 | End: 2024-01-07

## 2024-01-07 RX ORDER — POTASSIUM CHLORIDE 20 MEQ
40 PACKET (EA) ORAL ONCE
Refills: 0 | Status: COMPLETED | OUTPATIENT
Start: 2024-01-07 | End: 2024-01-07

## 2024-01-07 RX ADMIN — Medication 4: at 12:49

## 2024-01-07 RX ADMIN — Medication 36 UNIT(S): at 08:42

## 2024-01-07 RX ADMIN — Medication 36 UNIT(S): at 17:54

## 2024-01-07 RX ADMIN — INSULIN GLARGINE 34 UNIT(S): 100 INJECTION, SOLUTION SUBCUTANEOUS at 08:43

## 2024-01-07 RX ADMIN — ONDANSETRON 4 MILLIGRAM(S): 8 TABLET, FILM COATED ORAL at 21:35

## 2024-01-07 RX ADMIN — LACTULOSE 20 GRAM(S): 10 SOLUTION ORAL at 12:42

## 2024-01-07 RX ADMIN — POLYETHYLENE GLYCOL 3350 17 GRAM(S): 17 POWDER, FOR SOLUTION ORAL at 12:43

## 2024-01-07 RX ADMIN — NYSTATIN CREAM 1 APPLICATION(S): 100000 CREAM TOPICAL at 05:24

## 2024-01-07 RX ADMIN — Medication 500 MICROGRAM(S): at 09:37

## 2024-01-07 RX ADMIN — ATORVASTATIN CALCIUM 80 MILLIGRAM(S): 80 TABLET, FILM COATED ORAL at 21:34

## 2024-01-07 RX ADMIN — OXYCODONE HYDROCHLORIDE 30 MILLIGRAM(S): 5 TABLET ORAL at 18:56

## 2024-01-07 RX ADMIN — NYSTATIN CREAM 1 APPLICATION(S): 100000 CREAM TOPICAL at 17:55

## 2024-01-07 RX ADMIN — OXYCODONE HYDROCHLORIDE 30 MILLIGRAM(S): 5 TABLET ORAL at 17:56

## 2024-01-07 RX ADMIN — CHLORHEXIDINE GLUCONATE 1 APPLICATION(S): 213 SOLUTION TOPICAL at 08:49

## 2024-01-07 RX ADMIN — Medication 75 MILLIGRAM(S): at 05:25

## 2024-01-07 RX ADMIN — Medication 1 APPLICATION(S): at 17:55

## 2024-01-07 RX ADMIN — RIVAROXABAN 15 MILLIGRAM(S): KIT at 12:50

## 2024-01-07 RX ADMIN — OXYCODONE HYDROCHLORIDE 30 MILLIGRAM(S): 5 TABLET ORAL at 05:24

## 2024-01-07 RX ADMIN — FAMOTIDINE 20 MILLIGRAM(S): 10 INJECTION INTRAVENOUS at 12:42

## 2024-01-07 RX ADMIN — Medication 1 APPLICATION(S): at 05:24

## 2024-01-07 RX ADMIN — SENNA PLUS 2 TABLET(S): 8.6 TABLET ORAL at 21:41

## 2024-01-07 RX ADMIN — Medication 500 MICROGRAM(S): at 01:35

## 2024-01-07 RX ADMIN — INSULIN GLARGINE 34 UNIT(S): 100 INJECTION, SOLUTION SUBCUTANEOUS at 21:34

## 2024-01-07 RX ADMIN — Medication 2: at 17:54

## 2024-01-07 RX ADMIN — Medication 40 MILLIEQUIVALENT(S): at 08:43

## 2024-01-07 RX ADMIN — LEVALBUTEROL 0.63 MILLIGRAM(S): 1.25 SOLUTION, CONCENTRATE RESPIRATORY (INHALATION) at 01:36

## 2024-01-07 RX ADMIN — Medication 20 MILLIGRAM(S): at 05:24

## 2024-01-07 RX ADMIN — Medication 1 TABLET(S): at 12:43

## 2024-01-07 RX ADMIN — Medication 36 UNIT(S): at 12:49

## 2024-01-07 RX ADMIN — Medication 75 MILLIGRAM(S): at 12:44

## 2024-01-07 RX ADMIN — BUDESONIDE AND FORMOTEROL FUMARATE DIHYDRATE 2 PUFF(S): 160; 4.5 AEROSOL RESPIRATORY (INHALATION) at 09:37

## 2024-01-07 RX ADMIN — LEVALBUTEROL 0.63 MILLIGRAM(S): 1.25 SOLUTION, CONCENTRATE RESPIRATORY (INHALATION) at 09:36

## 2024-01-07 RX ADMIN — Medication 2: at 08:42

## 2024-01-07 RX ADMIN — ONDANSETRON 4 MILLIGRAM(S): 8 TABLET, FILM COATED ORAL at 08:44

## 2024-01-07 NOTE — PROGRESS NOTE ADULT - NUTRITIONAL ASSESSMENT
This patient has been assessed with a concern for Malnutrition and has been determined to have a diagnosis/diagnoses of Severe protein-calorie malnutrition and Morbid obesity (BMI > 40).    This patient is being managed with:   Diet Consistent Carbohydrate w/Evening Snack-  1500mL Fluid Restriction ([id number])  Low Sodium  Entered: Dec 19 2023  6:23PM   This patient has been assessed with a concern for Malnutrition and has been determined to have a diagnosis/diagnoses of Severe protein-calorie malnutrition and Morbid obesity (BMI > 40).    This patient is being managed with:   Diet Consistent Carbohydrate w/Evening Snack-  1500mL Fluid Restriction ([id number])  Low Sodium  Entered: Dec 19 2023  6:23PM

## 2024-01-07 NOTE — PROGRESS NOTE ADULT - SUBJECTIVE AND OBJECTIVE BOX
AGUEDA LUIS    [id number]    68y      Female    CC: SOB    INTERVAL HPI/OVERNIGHT EVENTS: Pt seen and examined. no acute events reported o/n     REVIEW OF SYSTEMS:    CONSTITUTIONAL: No weight loss  RESPIRATORY: No  wheezing, hemoptysis  CARDIOVASCULAR: No chest pain, palpitations  GASTROINTESTINAL: No abdominal or epigastric pain. No nausea, vomiting    Vital Signs Last 24 Hrs  T(C): 36.7 (07 Jan 2024 12:44), Max: 37 (06 Jan 2024 16:54)  T(F): 98 (07 Jan 2024 12:44), Max: 98.6 (06 Jan 2024 16:54)  HR: 82 (07 Jan 2024 12:44) (76 - 112)  BP: 105/62 (07 Jan 2024 12:44) (94/60 - 128/61)  BP(mean): 71 (06 Jan 2024 16:54) (71 - 71)  RR: 18 (07 Jan 2024 12:44) (18 - 18)  SpO2: 96% (07 Jan 2024 12:44) (94% - 98%)    Parameters below as of 07 Jan 2024 12:44  Patient On (Oxygen Delivery Method): nasal cannula  O2 Flow (L/min): 4      PHYSICAL EXAM:    GENERAL: NAD  CHEST/LUNG: decreased sounds b/l; respirations unlabored on 4LNC   HEART: S1S2+, Regular rate and rhythm  ABDOMEN: Soft, Nontender, Nondistended; Bowel sounds present  SKIN: warm, dry   NEURO: Awake, alert;   PSYCH: calm, cooperative     LABS:                        11.4   9.07  )-----------( 109      ( 07 Jan 2024 11:30 )             34.2     01-07    138  |  97  |  71.4<H>  ----------------------------<  181<H>  3.2<L>   |  26.0  |  1.55<H>    Ca    8.9      07 Jan 2024 05:30  Phos  3.2     01-07  Mg     1.9     01-07        Urinalysis Basic - ( 07 Jan 2024 05:30 )    Color: x / Appearance: x / SG: x / pH: x  Gluc: 181 mg/dL / Ketone: x  / Bili: x / Urobili: x   Blood: x / Protein: x / Nitrite: x   Leuk Esterase: x / RBC: x / WBC x   Sq Epi: x / Non Sq Epi: x / Bacteria: x          MEDICATIONS  (STANDING):  acetylcysteine 20%  Inhalation 4 milliLiter(s) Inhalation daily  atorvastatin 80 milliGRAM(s) Oral at bedtime  budesonide  80 MICROgram(s)/formoterol 4.5 MICROgram(s) Inhaler 2 Puff(s) Inhalation two times a day  chlorhexidine 2% Cloths 1 Application(s) Topical <User Schedule>  clotrimazole 1% Cream 1 Application(s) Topical two times a day  dextrose 5%. 1000 milliLiter(s) (100 mL/Hr) IV Continuous <Continuous>  dextrose 5%. 1000 milliLiter(s) (50 mL/Hr) IV Continuous <Continuous>  dextrose 50% Injectable 25 Gram(s) IV Push once  dextrose 50% Injectable 12.5 Gram(s) IV Push once  dextrose 50% Injectable 25 Gram(s) IV Push once  famotidine    Tablet 20 milliGRAM(s) Oral daily  glucagon  Injectable 1 milliGRAM(s) IntraMuscular once  insulin glargine Injectable (LANTUS) 34 Unit(s) SubCutaneous at bedtime  insulin glargine Injectable (LANTUS) 34 Unit(s) SubCutaneous every morning  insulin lispro (ADMELOG) corrective regimen sliding scale   SubCutaneous three times a day before meals  insulin lispro Injectable (ADMELOG) 36 Unit(s) SubCutaneous three times a day before meals  ipratropium    for Nebulization 500 MICROGram(s) Nebulizer every 6 hours  lactulose Syrup 20 Gram(s) Oral daily  levalbuterol Inhalation 0.63 milliGRAM(s) Inhalation every 6 hours  lidocaine   4% Patch 1 Patch Transdermal daily  metoprolol tartrate 75 milliGRAM(s) Oral every 8 hours  multivitamin 1 Tablet(s) Oral daily  nystatin Powder 1 Application(s) Topical two times a day  oxyCODONE  ER Tablet 30 milliGRAM(s) Oral every 12 hours  polyethylene glycol 3350 17 Gram(s) Oral daily  rivaroxaban 15 milliGRAM(s) Oral daily  senna 2 Tablet(s) Oral at bedtime  torsemide 20 milliGRAM(s) Oral daily    MEDICATIONS  (PRN):  acetaminophen     Tablet .. 650 milliGRAM(s) Oral every 6 hours PRN Temp greater or equal to 38C (100.4F), Mild Pain (1 - 3)  aluminum hydroxide/magnesium hydroxide/simethicone Suspension 30 milliLiter(s) Oral once PRN Dyspepsia  bisacodyl Suppository 10 milliGRAM(s) Rectal daily PRN Constipation  dextrose Oral Gel 15 Gram(s) Oral once PRN Blood Glucose LESS THAN 70 milliGRAM(s)/deciliter  diphenhydrAMINE 25 milliGRAM(s) Oral every 6 hours PRN Rash and/or Itching  hydrALAZINE Injectable 10 milliGRAM(s) IV Push every 4 hours PRN for systolic bp > 160  melatonin 3 milliGRAM(s) Oral at bedtime PRN Insomnia  metoprolol tartrate Injectable 5 milliGRAM(s) IV Push every 6 hours PRN heart rate sustaining greater than 130  ondansetron Injectable 4 milliGRAM(s) IV Push every 8 hours PRN Nausea and/or Vomiting      RADIOLOGY & ADDITIONAL TESTS:   AGUEDA LUIS    [id number]    68y      Female    CC: SOB    INTERVAL HPI/OVERNIGHT EVENTS: Pt seen and examined. no acute events reported o/n     REVIEW OF SYSTEMS:    CONSTITUTIONAL: No weight loss  RESPIRATORY: No  wheezing, hemoptysis  CARDIOVASCULAR: No chest pain, palpitations  GASTROINTESTINAL: No abdominal or epigastric pain. No nausea, vomiting    Vital Signs Last 24 Hrs  T(C): 36.7 (07 Jan 2024 12:44), Max: 37 (06 Jan 2024 16:54)  T(F): 98 (07 Jan 2024 12:44), Max: 98.6 (06 Jan 2024 16:54)  HR: 82 (07 Jan 2024 12:44) (76 - 112)  BP: 105/62 (07 Jan 2024 12:44) (94/60 - 128/61)  BP(mean): 71 (06 Jan 2024 16:54) (71 - 71)  RR: 18 (07 Jan 2024 12:44) (18 - 18)  SpO2: 96% (07 Jan 2024 12:44) (94% - 98%)    Parameters below as of 07 Jan 2024 12:44  Patient On (Oxygen Delivery Method): nasal cannula  O2 Flow (L/min): 4      PHYSICAL EXAM:    GENERAL: NAD  CHEST/LUNG: decreased sounds b/l; respirations unlabored on 4LNC   HEART: S1S2+, Regular rate and rhythm  ABDOMEN: Soft, Nontender, Nondistended; Bowel sounds present  SKIN: warm, dry   NEURO: Awake, alert;   PSYCH: calm, cooperative     LABS:                        11.4   9.07  )-----------( 109      ( 07 Jan 2024 11:30 )             34.2     01-07    138  |  97  |  71.4<H>  ----------------------------<  181<H>  3.2<L>   |  26.0  |  1.55<H>    Ca    8.9      07 Jan 2024 05:30  Phos  3.2     01-07  Mg     1.9     01-07        Urinalysis Basic - ( 07 Jan 2024 05:30 )    Color: x / Appearance: x / SG: x / pH: x  Gluc: 181 mg/dL / Ketone: x  / Bili: x / Urobili: x   Blood: x / Protein: x / Nitrite: x   Leuk Esterase: x / RBC: x / WBC x   Sq Epi: x / Non Sq Epi: x / Bacteria: x          MEDICATIONS  (STANDING):  acetylcysteine 20%  Inhalation 4 milliLiter(s) Inhalation daily  atorvastatin 80 milliGRAM(s) Oral at bedtime  budesonide  80 MICROgram(s)/formoterol 4.5 MICROgram(s) Inhaler 2 Puff(s) Inhalation two times a day  chlorhexidine 2% Cloths 1 Application(s) Topical <User Schedule>  clotrimazole 1% Cream 1 Application(s) Topical two times a day  dextrose 5%. 1000 milliLiter(s) (100 mL/Hr) IV Continuous <Continuous>  dextrose 5%. 1000 milliLiter(s) (50 mL/Hr) IV Continuous <Continuous>  dextrose 50% Injectable 25 Gram(s) IV Push once  dextrose 50% Injectable 12.5 Gram(s) IV Push once  dextrose 50% Injectable 25 Gram(s) IV Push once  famotidine    Tablet 20 milliGRAM(s) Oral daily  glucagon  Injectable 1 milliGRAM(s) IntraMuscular once  insulin glargine Injectable (LANTUS) 34 Unit(s) SubCutaneous at bedtime  insulin glargine Injectable (LANTUS) 34 Unit(s) SubCutaneous every morning  insulin lispro (ADMELOG) corrective regimen sliding scale   SubCutaneous three times a day before meals  insulin lispro Injectable (ADMELOG) 36 Unit(s) SubCutaneous three times a day before meals  ipratropium    for Nebulization 500 MICROGram(s) Nebulizer every 6 hours  lactulose Syrup 20 Gram(s) Oral daily  levalbuterol Inhalation 0.63 milliGRAM(s) Inhalation every 6 hours  lidocaine   4% Patch 1 Patch Transdermal daily  metoprolol tartrate 75 milliGRAM(s) Oral every 8 hours  multivitamin 1 Tablet(s) Oral daily  nystatin Powder 1 Application(s) Topical two times a day  oxyCODONE  ER Tablet 30 milliGRAM(s) Oral every 12 hours  polyethylene glycol 3350 17 Gram(s) Oral daily  rivaroxaban 15 milliGRAM(s) Oral daily  senna 2 Tablet(s) Oral at bedtime  torsemide 20 milliGRAM(s) Oral daily    MEDICATIONS  (PRN):  acetaminophen     Tablet .. 650 milliGRAM(s) Oral every 6 hours PRN Temp greater or equal to 38C (100.4F), Mild Pain (1 - 3)  aluminum hydroxide/magnesium hydroxide/simethicone Suspension 30 milliLiter(s) Oral once PRN Dyspepsia  bisacodyl Suppository 10 milliGRAM(s) Rectal daily PRN Constipation  dextrose Oral Gel 15 Gram(s) Oral once PRN Blood Glucose LESS THAN 70 milliGRAM(s)/deciliter  diphenhydrAMINE 25 milliGRAM(s) Oral every 6 hours PRN Rash and/or Itching  hydrALAZINE Injectable 10 milliGRAM(s) IV Push every 4 hours PRN for systolic bp > 160  melatonin 3 milliGRAM(s) Oral at bedtime PRN Insomnia  metoprolol tartrate Injectable 5 milliGRAM(s) IV Push every 6 hours PRN heart rate sustaining greater than 130  ondansetron Injectable 4 milliGRAM(s) IV Push every 8 hours PRN Nausea and/or Vomiting      RADIOLOGY & ADDITIONAL TESTS:

## 2024-01-07 NOTE — PROGRESS NOTE ADULT - ASSESSMENT
68y/oF PMH HTN, HLD, DM2, HFpEF, CKD3, DVT, Uterine CA, COPD on home O2 presenting with SOB from SNF. Had been started on vantin and steroids prior to admission. Pt admitted with acute on chronic respiratory failure 2/2 acute HFpEF exacerbation w/likely superimposed bacterial pna in setting of RSV, severe pHTN, new afib. CT imaging also with pna. now s/p [facility] 12/29 with mild LAD disease, pHTN. Pt weaned off bipap to NC. s/p heparin gtt, on xarelto     Acute on chronic hypoxic respiratory failure   Acute on chronic diastolic HFpEF   Severe pHTN   RML pna, viral 2/2 RSV and likely superimposed gram negative pna   New onset afib w/ RVR   -CT chest reviewed   -s/p [facility] with mild LAD dx, severe pHTN  -s/p course abx   -bcx neg   -s/p steroids   -cont inhalers, nebs  -cont torsemide   -cont nocturnal bipap   -cardio recs appreciated   -pulm recs appreciated   -cont metoprolol 75mg q8h  -prn IV lopressor for sustained HR >130  -EP recs appreciated   -no DCCV given respiratory status   -TTE: LVEF 60-65%, grade 2 ddx    JACQUES on CKD   -nephro recs appreciated   -f/u am bmp     ?urinary retention   -pt with [facility] on arrival from SNF, denies hx of retention, no recent TOV   -TOV today 1/7, f/u bladder scans    Hx DVT   -on empiric tx for PE/DVT since august   -LE duplex neg for dvt   -d-dimer wnl  -cont xarelto     chronic pain   -cont pain control, bowel regimen     HLD  -cont statin     COPD   -s/p steroids   -cont inhalers   -pulm recs appreciated     DM  -hgba1c 8.3   -likely exacerbated by steroids   -cont lantus, premeal, iss   -endocrine recs appreciated       vte ppx: xarelto     dispo: [facility] vs home p2p done, SW following    68y/oF PMH HTN, HLD, DM2, HFpEF, CKD3, DVT, Uterine CA, COPD on home O2 presenting with SOB from SNF. Had been started on vantin and steroids prior to admission. Pt admitted with acute on chronic respiratory failure 2/2 acute HFpEF exacerbation w/likely superimposed bacterial pna in setting of RSV, severe pHTN, new afib. CT imaging also with pna. now s/p [facility] 12/29 with mild LAD disease, pHTN. Pt weaned off bipap to NC. s/p heparin gtt, on xarelto     Acute on chronic hypoxic respiratory failure   Acute on chronic diastolic HFpEF   Severe pHTN   RML pna, viral 2/2 RSV and likely superimposed gram negative pna   New onset afib w/ RVR   -CT chest reviewed   -s/p [facility] with mild LAD dx, severe pHTN  -s/p course abx   -bcx neg   -s/p steroids   -cont inhalers, nebs  -cont torsemide   -cont nocturnal bipap   -cardio recs appreciated   -pulm recs appreciated   -cont metoprolol 75mg q8h  -prn IV lopressor for sustained HR >130  -EP recs appreciated   -no DCCV given respiratory status   -TTE: LVEF 60-65%, grade 2 ddx    JACQUES on CKD   -nephro recs appreciated   -f/u am bmp     ?urinary retention   -pt with [facility] on arrival from SNF, denies hx of retention, no recent TOV   -TOV today 1/7, f/u bladder scans    Hx DVT   -on empiric tx for PE/DVT since august   -LE duplex neg for dvt   -d-dimer wnl  -cont xarelto     chronic pain   -cont pain control, bowel regimen     HLD  -cont statin     COPD   -s/p steroids   -cont inhalers   -pulm recs appreciated     DM  -hgba1c 8.3   -likely exacerbated by steroids   -cont lantus, premeal, iss   -endocrine recs appreciated       vte ppx: xarelto     dispo: [facility] vs home p2p done, SW following

## 2024-01-08 ENCOUNTER — TRANSCRIPTION ENCOUNTER (OUTPATIENT)
Age: 69
End: 2024-01-08

## 2024-01-08 VITALS
DIASTOLIC BLOOD PRESSURE: 70 MMHG | HEART RATE: 92 BPM | RESPIRATION RATE: 18 BRPM | SYSTOLIC BLOOD PRESSURE: 113 MMHG | TEMPERATURE: 98 F | OXYGEN SATURATION: 96 %

## 2024-01-08 LAB
ANION GAP SERPL CALC-SCNC: 13 MMOL/L — SIGNIFICANT CHANGE UP (ref 5–17)
ANION GAP SERPL CALC-SCNC: 13 MMOL/L — SIGNIFICANT CHANGE UP (ref 5–17)
BUN SERPL-MCNC: 69.8 MG/DL — HIGH (ref 8–20)
BUN SERPL-MCNC: 69.8 MG/DL — HIGH (ref 8–20)
CALCIUM SERPL-MCNC: 9 MG/DL — SIGNIFICANT CHANGE UP (ref 8.4–10.5)
CALCIUM SERPL-MCNC: 9 MG/DL — SIGNIFICANT CHANGE UP (ref 8.4–10.5)
CHLORIDE SERPL-SCNC: 98 MMOL/L — SIGNIFICANT CHANGE UP (ref 96–108)
CHLORIDE SERPL-SCNC: 98 MMOL/L — SIGNIFICANT CHANGE UP (ref 96–108)
CO2 SERPL-SCNC: 29 MMOL/L — SIGNIFICANT CHANGE UP (ref 22–29)
CO2 SERPL-SCNC: 29 MMOL/L — SIGNIFICANT CHANGE UP (ref 22–29)
CREAT SERPL-MCNC: 1.52 MG/DL — HIGH (ref 0.5–1.3)
CREAT SERPL-MCNC: 1.52 MG/DL — HIGH (ref 0.5–1.3)
EGFR: 37 ML/MIN/1.73M2 — LOW
EGFR: 37 ML/MIN/1.73M2 — LOW
GLUCOSE BLDC GLUCOMTR-MCNC: 161 MG/DL — HIGH (ref 70–99)
GLUCOSE BLDC GLUCOMTR-MCNC: 161 MG/DL — HIGH (ref 70–99)
GLUCOSE BLDC GLUCOMTR-MCNC: 166 MG/DL — HIGH (ref 70–99)
GLUCOSE BLDC GLUCOMTR-MCNC: 166 MG/DL — HIGH (ref 70–99)
GLUCOSE SERPL-MCNC: 138 MG/DL — HIGH (ref 70–99)
GLUCOSE SERPL-MCNC: 138 MG/DL — HIGH (ref 70–99)
MAGNESIUM SERPL-MCNC: 2.2 MG/DL — SIGNIFICANT CHANGE UP (ref 1.6–2.6)
MAGNESIUM SERPL-MCNC: 2.2 MG/DL — SIGNIFICANT CHANGE UP (ref 1.6–2.6)
POTASSIUM SERPL-MCNC: 4.1 MMOL/L — SIGNIFICANT CHANGE UP (ref 3.5–5.3)
POTASSIUM SERPL-MCNC: 4.1 MMOL/L — SIGNIFICANT CHANGE UP (ref 3.5–5.3)
POTASSIUM SERPL-SCNC: 4.1 MMOL/L — SIGNIFICANT CHANGE UP (ref 3.5–5.3)
POTASSIUM SERPL-SCNC: 4.1 MMOL/L — SIGNIFICANT CHANGE UP (ref 3.5–5.3)
SODIUM SERPL-SCNC: 140 MMOL/L — SIGNIFICANT CHANGE UP (ref 135–145)
SODIUM SERPL-SCNC: 140 MMOL/L — SIGNIFICANT CHANGE UP (ref 135–145)

## 2024-01-08 PROCEDURE — 99291 CRITICAL CARE FIRST HOUR: CPT

## 2024-01-08 PROCEDURE — 83735 ASSAY OF MAGNESIUM: CPT

## 2024-01-08 PROCEDURE — C1769: CPT

## 2024-01-08 PROCEDURE — 93460 R&L HRT ART/VENTRICLE ANGIO: CPT

## 2024-01-08 PROCEDURE — 87899 AGENT NOS ASSAY W/OPTIC: CPT

## 2024-01-08 PROCEDURE — 85027 COMPLETE CBC AUTOMATED: CPT

## 2024-01-08 PROCEDURE — 87641 MR-STAPH DNA AMP PROBE: CPT

## 2024-01-08 PROCEDURE — C1889: CPT

## 2024-01-08 PROCEDURE — 70450 CT HEAD/BRAIN W/O DYE: CPT

## 2024-01-08 PROCEDURE — 81003 URINALYSIS AUTO W/O SCOPE: CPT

## 2024-01-08 PROCEDURE — 71250 CT THORAX DX C-: CPT | Mod: MA

## 2024-01-08 PROCEDURE — 84132 ASSAY OF SERUM POTASSIUM: CPT

## 2024-01-08 PROCEDURE — 85379 FIBRIN DEGRADATION QUANT: CPT

## 2024-01-08 PROCEDURE — 85610 PROTHROMBIN TIME: CPT

## 2024-01-08 PROCEDURE — 94760 N-INVAS EAR/PLS OXIMETRY 1: CPT

## 2024-01-08 PROCEDURE — 93970 EXTREMITY STUDY: CPT

## 2024-01-08 PROCEDURE — 93321 DOPPLER ECHO F-UP/LMTD STD: CPT

## 2024-01-08 PROCEDURE — 99239 HOSP IP/OBS DSCHRG MGMT >30: CPT

## 2024-01-08 PROCEDURE — 87040 BLOOD CULTURE FOR BACTERIA: CPT

## 2024-01-08 PROCEDURE — 93926 LOWER EXTREMITY STUDY: CPT

## 2024-01-08 PROCEDURE — 85018 HEMOGLOBIN: CPT

## 2024-01-08 PROCEDURE — 84443 ASSAY THYROID STIM HORMONE: CPT

## 2024-01-08 PROCEDURE — 80053 COMPREHEN METABOLIC PANEL: CPT

## 2024-01-08 PROCEDURE — 83605 ASSAY OF LACTIC ACID: CPT

## 2024-01-08 PROCEDURE — 0225U NFCT DS DNA&RNA 21 SARSCOV2: CPT

## 2024-01-08 PROCEDURE — 83036 HEMOGLOBIN GLYCOSYLATED A1C: CPT

## 2024-01-08 PROCEDURE — 87449 NOS EACH ORGANISM AG IA: CPT

## 2024-01-08 PROCEDURE — 82435 ASSAY OF BLOOD CHLORIDE: CPT

## 2024-01-08 PROCEDURE — 85025 COMPLETE CBC W/AUTO DIFF WBC: CPT

## 2024-01-08 PROCEDURE — 85014 HEMATOCRIT: CPT

## 2024-01-08 PROCEDURE — 80076 HEPATIC FUNCTION PANEL: CPT

## 2024-01-08 PROCEDURE — 82947 ASSAY GLUCOSE BLOOD QUANT: CPT

## 2024-01-08 PROCEDURE — C1894: CPT

## 2024-01-08 PROCEDURE — 94640 AIRWAY INHALATION TREATMENT: CPT

## 2024-01-08 PROCEDURE — 84436 ASSAY OF TOTAL THYROXINE: CPT

## 2024-01-08 PROCEDURE — 82330 ASSAY OF CALCIUM: CPT

## 2024-01-08 PROCEDURE — 84100 ASSAY OF PHOSPHORUS: CPT

## 2024-01-08 PROCEDURE — 87640 STAPH A DNA AMP PROBE: CPT

## 2024-01-08 PROCEDURE — 96367 TX/PROPH/DG ADDL SEQ IV INF: CPT

## 2024-01-08 PROCEDURE — 96375 TX/PRO/DX INJ NEW DRUG ADDON: CPT

## 2024-01-08 PROCEDURE — 94660 CPAP INITIATION&MGMT: CPT

## 2024-01-08 PROCEDURE — 84439 ASSAY OF FREE THYROXINE: CPT

## 2024-01-08 PROCEDURE — 84145 PROCALCITONIN (PCT): CPT

## 2024-01-08 PROCEDURE — C1887: CPT

## 2024-01-08 PROCEDURE — 99232 SBSQ HOSP IP/OBS MODERATE 35: CPT

## 2024-01-08 PROCEDURE — 85730 THROMBOPLASTIN TIME PARTIAL: CPT

## 2024-01-08 PROCEDURE — 81001 URINALYSIS AUTO W/SCOPE: CPT

## 2024-01-08 PROCEDURE — 83880 ASSAY OF NATRIURETIC PEPTIDE: CPT

## 2024-01-08 PROCEDURE — 93308 TTE F-UP OR LMTD: CPT

## 2024-01-08 PROCEDURE — 84484 ASSAY OF TROPONIN QUANT: CPT

## 2024-01-08 PROCEDURE — 84295 ASSAY OF SERUM SODIUM: CPT

## 2024-01-08 PROCEDURE — 80061 LIPID PANEL: CPT

## 2024-01-08 PROCEDURE — 80162 ASSAY OF DIGOXIN TOTAL: CPT

## 2024-01-08 PROCEDURE — 96365 THER/PROPH/DIAG IV INF INIT: CPT

## 2024-01-08 PROCEDURE — 82803 BLOOD GASES ANY COMBINATION: CPT

## 2024-01-08 PROCEDURE — P9047: CPT

## 2024-01-08 PROCEDURE — 71045 X-RAY EXAM CHEST 1 VIEW: CPT

## 2024-01-08 PROCEDURE — 80048 BASIC METABOLIC PNL TOTAL CA: CPT

## 2024-01-08 PROCEDURE — 93005 ELECTROCARDIOGRAM TRACING: CPT

## 2024-01-08 PROCEDURE — 82962 GLUCOSE BLOOD TEST: CPT

## 2024-01-08 PROCEDURE — 87086 URINE CULTURE/COLONY COUNT: CPT

## 2024-01-08 PROCEDURE — C8929: CPT

## 2024-01-08 PROCEDURE — 36415 COLL VENOUS BLD VENIPUNCTURE: CPT

## 2024-01-08 PROCEDURE — 83690 ASSAY OF LIPASE: CPT

## 2024-01-08 PROCEDURE — 80202 ASSAY OF VANCOMYCIN: CPT

## 2024-01-08 RX ORDER — LIDOCAINE 4 G/100G
1 CREAM TOPICAL
Qty: 0 | Refills: 0 | DISCHARGE
Start: 2024-01-08

## 2024-01-08 RX ORDER — ALPRAZOLAM 0.25 MG
0.25 TABLET ORAL EVERY 8 HOURS
Refills: 0 | Status: DISCONTINUED | OUTPATIENT
Start: 2024-01-08 | End: 2024-01-08

## 2024-01-08 RX ORDER — ALPRAZOLAM 0.25 MG
1 TABLET ORAL
Qty: 0 | Refills: 0 | DISCHARGE
Start: 2024-01-08

## 2024-01-08 RX ORDER — OXYCODONE HYDROCHLORIDE 5 MG/1
5 TABLET ORAL EVERY 4 HOURS
Refills: 0 | Status: DISCONTINUED | OUTPATIENT
Start: 2024-01-08 | End: 2024-01-08

## 2024-01-08 RX ADMIN — Medication 2: at 08:53

## 2024-01-08 RX ADMIN — Medication 4 MILLILITER(S): at 08:31

## 2024-01-08 RX ADMIN — INSULIN GLARGINE 34 UNIT(S): 100 INJECTION, SOLUTION SUBCUTANEOUS at 08:54

## 2024-01-08 RX ADMIN — OXYCODONE HYDROCHLORIDE 30 MILLIGRAM(S): 5 TABLET ORAL at 05:54

## 2024-01-08 RX ADMIN — LACTULOSE 20 GRAM(S): 10 SOLUTION ORAL at 12:39

## 2024-01-08 RX ADMIN — Medication 0.25 MILLIGRAM(S): at 15:31

## 2024-01-08 RX ADMIN — Medication 2: at 12:41

## 2024-01-08 RX ADMIN — LEVALBUTEROL 0.63 MILLIGRAM(S): 1.25 SOLUTION, CONCENTRATE RESPIRATORY (INHALATION) at 02:39

## 2024-01-08 RX ADMIN — LEVALBUTEROL 0.63 MILLIGRAM(S): 1.25 SOLUTION, CONCENTRATE RESPIRATORY (INHALATION) at 08:31

## 2024-01-08 RX ADMIN — FAMOTIDINE 20 MILLIGRAM(S): 10 INJECTION INTRAVENOUS at 12:52

## 2024-01-08 RX ADMIN — POLYETHYLENE GLYCOL 3350 17 GRAM(S): 17 POWDER, FOR SOLUTION ORAL at 12:38

## 2024-01-08 RX ADMIN — Medication 36 UNIT(S): at 12:42

## 2024-01-08 RX ADMIN — Medication 1 TABLET(S): at 12:39

## 2024-01-08 RX ADMIN — Medication 75 MILLIGRAM(S): at 05:54

## 2024-01-08 RX ADMIN — Medication 36 UNIT(S): at 08:53

## 2024-01-08 RX ADMIN — Medication 75 MILLIGRAM(S): at 12:39

## 2024-01-08 RX ADMIN — CHLORHEXIDINE GLUCONATE 1 APPLICATION(S): 213 SOLUTION TOPICAL at 05:53

## 2024-01-08 RX ADMIN — OXYCODONE HYDROCHLORIDE 5 MILLIGRAM(S): 5 TABLET ORAL at 10:33

## 2024-01-08 RX ADMIN — RIVAROXABAN 15 MILLIGRAM(S): KIT at 12:50

## 2024-01-08 RX ADMIN — OXYCODONE HYDROCHLORIDE 30 MILLIGRAM(S): 5 TABLET ORAL at 06:55

## 2024-01-08 RX ADMIN — BUDESONIDE AND FORMOTEROL FUMARATE DIHYDRATE 2 PUFF(S): 160; 4.5 AEROSOL RESPIRATORY (INHALATION) at 08:34

## 2024-01-08 RX ADMIN — NYSTATIN CREAM 1 APPLICATION(S): 100000 CREAM TOPICAL at 05:53

## 2024-01-08 RX ADMIN — Medication 500 MICROGRAM(S): at 08:31

## 2024-01-08 RX ADMIN — Medication 20 MILLIGRAM(S): at 05:54

## 2024-01-08 RX ADMIN — Medication 500 MICROGRAM(S): at 02:39

## 2024-01-08 RX ADMIN — Medication 1 APPLICATION(S): at 05:55

## 2024-01-08 NOTE — PROGRESS NOTE ADULT - NS ATTEND OPT1A GEN_ALL_CORE
History/Exam/Medical decision making
History/Medical decision making
Medical decision making
History/Exam/Medical decision making

## 2024-01-08 NOTE — PROGRESS NOTE ADULT - ASSESSMENT
67 yo female, morbidly obese, with HTN, HLD, DM2, HFpEF, CKD III, DVT, Uterine CA, COPD on home o2 who presented with complaints of shortness of breath. Admitted with acute on chronic resp failure and acute HFpEF exacerbation with likely with Superimposed bacterial Pneumonia in setting of RSV and new onset AFib and worsening GFR. S/p [facility] with mild LAD disease, Severe pulmonary HTN, LVEDP stable    1. T2DM complicated by CKD, glucoses improving  - Continue premeal admelog 36 units TID with meals  - Continue lantus 34 units BID  - Sliding scale coverage    2. HLD  - Continue statin    3. JACQUES on CKD   - Monitor kidney function  - Nephrology following 67 yo female, morbidly obese, with HTN, HLD, DM2, HFpEF, CKD III, DVT, Uterine CA, COPD on home o2 who presented with complaints of shortness of breath. Admitted with acute on chronic resp failure and acute HFpEF exacerbation with likely with Superimposed bacterial Pneumonia in setting of RSV and new onset AFib and worsening GFR. S/p [facility] with mild LAD disease, Severe pulmonary HTN, LVEDP stable    1. T2DM complicated by CKD, glucoses improving  - Continue premeal admelog 36 units TID with meals  - Continue lantus 34 units BID  - Sliding scale coverage    2. HLD  - Continue statin    3. JACQUES on CKD   - Monitor kidney function  - Nephrology following

## 2024-01-08 NOTE — PROGRESS NOTE ADULT - REASON FOR ADMISSION
SOB

## 2024-01-08 NOTE — PROGRESS NOTE ADULT - NUTRITIONAL ASSESSMENT
This patient has been assessed with a concern for Malnutrition and has been determined to have a diagnosis/diagnoses of Severe protein-calorie malnutrition and Morbid obesity (BMI > 40).    This patient is being managed with:   Diet Consistent Carbohydrate w/Evening Snack-  1500mL Fluid Restriction ([id number])  Low Sodium  Entered: Dec 19 2023  6:23PM   This patient has been assessed with a concern for Malnutrition and has been determined to have a diagnosis/diagnoses of Severe protein-calorie malnutrition and Morbid obesity (BMI > 40).    This patient is being managed with:   Diet Consistent Carbohydrate w/Evening Snack-  1500mL Fluid Restriction ([id number])  Low Sodium  Entered: Dec 19 2023  6:23PM

## 2024-01-08 NOTE — DISCHARGE NOTE NURSING/CASE MANAGEMENT/SOCIAL WORK - NSDCVIVACCINE_GEN_ALL_CORE_FT
COVID-19 vaccine, vector-nr, rS-Ad26, PF, 0.5 mL (Omar); 16-Mar-2021 13:03; Kimberly Maya (RN); [facility]; [id number] (Exp. Date: 26-May-2021); IntraMuscular; Deltoid Left.; 0.5 milliLiter(s);   COVID-19, mRNA, LNP-S, PF, 100 mcg/ 0.5 mL dose (Moderna); 28-Jan-2022 10:09; Senia Juan (RN); [employer] [id number] (Exp. Date: 14-Apr-2022); IntraMuscular; Deltoid Left.; 0.25 milliLiter(s);   influenza, injectable, quadrivalent, preservative free; 09-Nov-2018 13:40; Mane Camejo (RN); [email or web address]; [id number] (Exp. Date: 16-Jun-2019); IntraMuscular; Deltoid Left.; 0.5 milliLiter(s); VIS (VIS Published: 07-Aug-2015, VIS Presented: 09-Nov-2018);   influenza, injectable, quadrivalent, preservative free; 15-Nov-2020 12:04; Ramiro Alexander (RN); [employer]; [id number] (Exp. Date: 30-Jun-2021); IntraMuscular; Deltoid Left.; 0.5 milliLiter(s); VIS (VIS Published: 15-Aug-2019, VIS Presented: 15-Nov-2020);   influenza, high-dose, quadrivalent; 28-Jan-2022 11:42; Senia Juan (RN); [employer]; [id number] (Exp. Date: 30-Jun-2022); IntraMuscular; Deltoid Right.; 0.7 milliLiter(s); VIS (VIS Published: 06-Aug-2021, VIS Presented: 28-Jan-2022);   influenza, high-dose, quadrivalent; 04-Oct-2023 13:14; Coreen Abraham (RN); [employer]; [id number] (Exp. Date: 01-Jun-2026); IntraMuscular; Deltoid Left.; 0.7 milliLiter(s); VIS (VIS Published: 06-Aug-2021, VIS Presented: 04-Oct-2023);   Tdap; 24-Jan-2022 08:46; Lisa Yanes (RN); [employer]; [id number] (Exp. Date: 09-Sep-2023); IntraMuscular; Deltoid Left.; 0.5 milliLiter(s); VIS (VIS Published: 09-May-2013, VIS Presented: 24-Jan-2022);   Tdap; 30-Aug-2023 17:21; Edwige Reyes (DANIEL); [employer]; [id number] (Exp. Date: 03-Oct-2025); IntraMuscular; Deltoid Left.; 0.5 milliLiter(s); VIS (VIS Published: 09-May-2013, VIS Presented: 30-Aug-2023);    COVID-19 vaccine, vector-nr, rS-Ad26, PF, 0.5 mL (Omar); 16-Mar-2021 13:03; Kimberly Maya (RN); [facility]; [id number] (Exp. Date: 26-May-2021); IntraMuscular; Deltoid Left.; 0.5 milliLiter(s);   COVID-19, mRNA, LNP-S, PF, 100 mcg/ 0.5 mL dose (Moderna); 28-Jan-2022 10:09; Senia Juan (RN); [employer] [id number] (Exp. Date: 14-Apr-2022); IntraMuscular; Deltoid Left.; 0.25 milliLiter(s);   influenza, injectable, quadrivalent, preservative free; 09-Nov-2018 13:40; Mane Camejo (RN); [email or web address]; [id number] (Exp. Date: 16-Jun-2019); IntraMuscular; Deltoid Left.; 0.5 milliLiter(s); VIS (VIS Published: 07-Aug-2015, VIS Presented: 09-Nov-2018);   influenza, injectable, quadrivalent, preservative free; 15-Nov-2020 12:04; Ramiro Alexander (RN); [employer]; [id number] (Exp. Date: 30-Jun-2021); IntraMuscular; Deltoid Left.; 0.5 milliLiter(s); VIS (VIS Published: 15-Aug-2019, VIS Presented: 15-Nov-2020);   influenza, high-dose, quadrivalent; 28-Jan-2022 11:42; Senia Juan (RN); [employer]; [id number] (Exp. Date: 30-Jun-2022); IntraMuscular; Deltoid Right.; 0.7 milliLiter(s); VIS (VIS Published: 06-Aug-2021, VIS Presented: 28-Jan-2022);   influenza, high-dose, quadrivalent; 04-Oct-2023 13:14; Coreen Abraham (RN); [employer]; [id number] (Exp. Date: 01-Jun-2026); IntraMuscular; Deltoid Left.; 0.7 milliLiter(s); VIS (VIS Published: 06-Aug-2021, VIS Presented: 04-Oct-2023);   Tdap; 24-Jan-2022 08:46; Lisa Yanes (RN); [employer]; [id number] (Exp. Date: 09-Sep-2023); IntraMuscular; Deltoid Left.; 0.5 milliLiter(s); VIS (VIS Published: 09-May-2013, VIS Presented: 24-Jan-2022);   Tdap; 30-Aug-2023 17:21; Edwige Reyes (DANIEL); [employer]; [id number] (Exp. Date: 03-Oct-2025); IntraMuscular; Deltoid Left.; 0.5 milliLiter(s); VIS (VIS Published: 09-May-2013, VIS Presented: 30-Aug-2023);

## 2024-01-08 NOTE — PROGRESS NOTE ADULT - NS ATTEND OPT1 GEN_ALL_CORE
I attest my time as attending is greater than 50% of the total combined time spent on qualifying patient care activities by the PA/NP and attending.
I independently performed the documented:
I independently performed the documented:
I attest my time as attending is greater than 50% of the total combined time spent on qualifying patient care activities by the PA/NP and attending.
I independently performed the documented:
I attest my time as attending is greater than 50% of the total combined time spent on qualifying patient care activities by the PA/NP and attending.
I independently performed the documented:
I attest my time as attending is greater than 50% of the total combined time spent on qualifying patient care activities by the PA/NP and attending.
I attest my time as attending is greater than 50% of the total combined time spent on qualifying patient care activities by the PA/NP and attending.
I independently performed the documented:
I attest my time as attending is greater than 50% of the total combined time spent on qualifying patient care activities by the PA/NP and attending.

## 2024-01-08 NOTE — PROGRESS NOTE ADULT - SUBJECTIVE AND OBJECTIVE BOX
INTERVAL EVENTS:  Follow up diabetes management    ROS: Denies any pain at time of exam, endorses good appetite.     MEDICATIONS  (STANDING):  acetylcysteine 20%  Inhalation 4 milliLiter(s) Inhalation daily  atorvastatin 80 milliGRAM(s) Oral at bedtime  budesonide  80 MICROgram(s)/formoterol 4.5 MICROgram(s) Inhaler 2 Puff(s) Inhalation two times a day  chlorhexidine 2% Cloths 1 Application(s) Topical <User Schedule>  clotrimazole 1% Cream 1 Application(s) Topical two times a day  dextrose 5%. 1000 milliLiter(s) (100 mL/Hr) IV Continuous <Continuous>  dextrose 5%. 1000 milliLiter(s) (50 mL/Hr) IV Continuous <Continuous>  dextrose 50% Injectable 25 Gram(s) IV Push once  dextrose 50% Injectable 12.5 Gram(s) IV Push once  dextrose 50% Injectable 25 Gram(s) IV Push once  famotidine    Tablet 20 milliGRAM(s) Oral daily  glucagon  Injectable 1 milliGRAM(s) IntraMuscular once  insulin glargine Injectable (LANTUS) 34 Unit(s) SubCutaneous every morning  insulin glargine Injectable (LANTUS) 34 Unit(s) SubCutaneous at bedtime  insulin lispro (ADMELOG) corrective regimen sliding scale   SubCutaneous three times a day before meals  insulin lispro Injectable (ADMELOG) 36 Unit(s) SubCutaneous three times a day before meals  ipratropium    for Nebulization 500 MICROGram(s) Nebulizer every 6 hours  lactulose Syrup 20 Gram(s) Oral daily  levalbuterol Inhalation 0.63 milliGRAM(s) Inhalation every 6 hours  lidocaine   4% Patch 1 Patch Transdermal daily  metoprolol tartrate 75 milliGRAM(s) Oral every 8 hours  multivitamin 1 Tablet(s) Oral daily  nystatin Powder 1 Application(s) Topical two times a day  oxyCODONE  ER Tablet 30 milliGRAM(s) Oral every 12 hours  polyethylene glycol 3350 17 Gram(s) Oral daily  rivaroxaban 15 milliGRAM(s) Oral daily  senna 2 Tablet(s) Oral at bedtime  torsemide 20 milliGRAM(s) Oral daily    MEDICATIONS  (PRN):  acetaminophen     Tablet .. 650 milliGRAM(s) Oral every 6 hours PRN Temp greater or equal to 38C (100.4F), Mild Pain (1 - 3)  aluminum hydroxide/magnesium hydroxide/simethicone Suspension 30 milliLiter(s) Oral once PRN Dyspepsia  bisacodyl Suppository 10 milliGRAM(s) Rectal daily PRN Constipation  dextrose Oral Gel 15 Gram(s) Oral once PRN Blood Glucose LESS THAN 70 milliGRAM(s)/deciliter  diphenhydrAMINE 25 milliGRAM(s) Oral every 6 hours PRN Rash and/or Itching  hydrALAZINE Injectable 10 milliGRAM(s) IV Push every 4 hours PRN for systolic bp > 160  melatonin 3 milliGRAM(s) Oral at bedtime PRN Insomnia  metoprolol tartrate Injectable 5 milliGRAM(s) IV Push every 6 hours PRN heart rate sustaining greater than 130  ondansetron Injectable 4 milliGRAM(s) IV Push every 8 hours PRN Nausea and/or Vomiting  oxyCODONE    IR 5 milliGRAM(s) Oral every 4 hours PRN Moderate Pain (4 - 6)    Allergies  wool- rash, itch (Other)  adhesives (Rash)  latex (Rash)  Bactrim (Flushing)    Vital Signs Last 24 Hrs  T(C): 36.7 (08 Jan 2024 08:10), Max: 36.9 (07 Jan 2024 21:09)  T(F): 98 (08 Jan 2024 08:10), Max: 98.4 (07 Jan 2024 21:09)  HR: 90 (08 Jan 2024 08:31) (67 - 98)  BP: 104/58 (08 Jan 2024 08:10) (92/60 - 123/61)  BP(mean): 63 (07 Jan 2024 16:28) (63 - 63)  RR: 18 (08 Jan 2024 08:10) (18 - 20)  SpO2: 98% (08 Jan 2024 08:31) (94% - 100%)    Parameters below as of 08 Jan 2024 08:31  Patient On (Oxygen Delivery Method): nasal cannula, 4L nc    PHYSICAL EXAM:  General: No apparent distress, obese  Neck: Supple, trachea midline, no thyromegaly  Respiratory: Lungs clear bilaterally, normal rate, effort  Cardiac: +S1, S2, no m/r/g  GI: +BS, soft, non tender, non distended  Extremities: LE redness, chronic changes  Neuro: A+O X3, no tremor    LABS:                        11.4   9.07  )-----------( 109      ( 07 Jan 2024 11:30 )             34.2     01-08    140  |  98  |  69.8<H>  ----------------------------<  138<H>  4.1   |  29.0  |  1.52<H>    Ca    9.0      08 Jan 2024 05:34  Phos  3.2     01-07  Mg     2.2     01-08      Urinalysis Basic - ( 08 Jan 2024 05:34 )    Color: x / Appearance: x / SG: x / pH: x  Gluc: 138 mg/dL / Ketone: x  / Bili: x / Urobili: x   Blood: x / Protein: x / Nitrite: x   Leuk Esterase: x / RBC: x / WBC x   Sq Epi: x / Non Sq Epi: x / Bacteria: x    POCT Blood Glucose.: 166 mg/dL (01-08-24 @ 08:20)  POCT Blood Glucose.: 145 mg/dL (01-07-24 @ 21:24)  POCT Blood Glucose.: 161 mg/dL (01-07-24 @ 17:02)  POCT Blood Glucose.: 232 mg/dL (01-07-24 @ 12:06)      Thyroid Stimulating Hormone, Serum: 1.42 uIU/mL (12-29-23 @ 05:52)  Free Thyroxine, Serum: 1.6 ng/dL (12-29-23 @ 05:52)  Thyroid Stimulating Hormone, Serum: 0.34 uIU/mL (12-22-23 @ 04:51)  Thyroid Stimulating Hormone, Serum: 1.51 uIU/mL (12-16-23 @ 15:20)  Thyroid Stimulating Hormone, Serum: 3.02 uIU/mL (12-16-23 @ 10:18)

## 2024-01-08 NOTE — PROGRESS NOTE ADULT - SUBJECTIVE AND OBJECTIVE BOX
AGUEDA LUIS    [id number]    68y      Female    CC: SOB    INTERVAL HPI/OVERNIGHT EVENTS: Pt seen and examined. +Voiding     REVIEW OF SYSTEMS:    CONSTITUTIONAL: No fever, weight loss  RESPIRATORY: No cough, wheezing, hemoptysis  CARDIOVASCULAR: No chest pain, palpitations  GASTROINTESTINAL: No abdominal or epigastric pain. No nausea, vomiting    Vital Signs Last 24 Hrs  T(C): 36.7 (08 Jan 2024 08:10), Max: 36.9 (07 Jan 2024 21:09)  T(F): 98 (08 Jan 2024 08:10), Max: 98.4 (07 Jan 2024 21:09)  HR: 90 (08 Jan 2024 08:31) (67 - 98)  BP: 104/58 (08 Jan 2024 08:10) (92/60 - 123/61)  BP(mean): 63 (07 Jan 2024 16:28) (63 - 63)  RR: 18 (08 Jan 2024 08:10) (18 - 20)  SpO2: 98% (08 Jan 2024 08:31) (94% - 100%)    Parameters below as of 08 Jan 2024 08:31  Patient On (Oxygen Delivery Method): nasal cannula, 4L nc        PHYSICAL EXAM:    GENERAL: NAD  CHEST/LUNG: decreased sounds b/l; respirations unlabored on 4LNC   HEART: S1S2+, Regular rate and rhythm  ABDOMEN: Soft, Nontender, Nondistended; Bowel sounds present  SKIN: warm, dry   NEURO: Awake, alert;   PSYCH: calm, cooperative     LABS:                        11.4   9.07  )-----------( 109      ( 07 Jan 2024 11:30 )             34.2     01-08    140  |  98  |  69.8<H>  ----------------------------<  138<H>  4.1   |  29.0  |  1.52<H>    Ca    9.0      08 Jan 2024 05:34  Phos  3.2     01-07  Mg     2.2     01-08        Urinalysis Basic - ( 08 Jan 2024 05:34 )    Color: x / Appearance: x / SG: x / pH: x  Gluc: 138 mg/dL / Ketone: x  / Bili: x / Urobili: x   Blood: x / Protein: x / Nitrite: x   Leuk Esterase: x / RBC: x / WBC x   Sq Epi: x / Non Sq Epi: x / Bacteria: x          MEDICATIONS  (STANDING):  atorvastatin 80 milliGRAM(s) Oral at bedtime  budesonide  80 MICROgram(s)/formoterol 4.5 MICROgram(s) Inhaler 2 Puff(s) Inhalation two times a day  chlorhexidine 2% Cloths 1 Application(s) Topical <User Schedule>  clotrimazole 1% Cream 1 Application(s) Topical two times a day  dextrose 5%. 1000 milliLiter(s) (100 mL/Hr) IV Continuous <Continuous>  dextrose 5%. 1000 milliLiter(s) (50 mL/Hr) IV Continuous <Continuous>  dextrose 50% Injectable 12.5 Gram(s) IV Push once  dextrose 50% Injectable 25 Gram(s) IV Push once  dextrose 50% Injectable 25 Gram(s) IV Push once  famotidine    Tablet 20 milliGRAM(s) Oral daily  glucagon  Injectable 1 milliGRAM(s) IntraMuscular once  insulin glargine Injectable (LANTUS) 34 Unit(s) SubCutaneous every morning  insulin glargine Injectable (LANTUS) 34 Unit(s) SubCutaneous at bedtime  insulin lispro (ADMELOG) corrective regimen sliding scale   SubCutaneous three times a day before meals  insulin lispro Injectable (ADMELOG) 36 Unit(s) SubCutaneous three times a day before meals  ipratropium    for Nebulization 500 MICROGram(s) Nebulizer every 6 hours  lactulose Syrup 20 Gram(s) Oral daily  levalbuterol Inhalation 0.63 milliGRAM(s) Inhalation every 6 hours  lidocaine   4% Patch 1 Patch Transdermal daily  metoprolol tartrate 75 milliGRAM(s) Oral every 8 hours  multivitamin 1 Tablet(s) Oral daily  nystatin Powder 1 Application(s) Topical two times a day  oxyCODONE  ER Tablet 30 milliGRAM(s) Oral every 12 hours  polyethylene glycol 3350 17 Gram(s) Oral daily  rivaroxaban 15 milliGRAM(s) Oral daily  senna 2 Tablet(s) Oral at bedtime  torsemide 20 milliGRAM(s) Oral daily    MEDICATIONS  (PRN):  acetaminophen     Tablet .. 650 milliGRAM(s) Oral every 6 hours PRN Temp greater or equal to 38C (100.4F), Mild Pain (1 - 3)  aluminum hydroxide/magnesium hydroxide/simethicone Suspension 30 milliLiter(s) Oral once PRN Dyspepsia  bisacodyl Suppository 10 milliGRAM(s) Rectal daily PRN Constipation  dextrose Oral Gel 15 Gram(s) Oral once PRN Blood Glucose LESS THAN 70 milliGRAM(s)/deciliter  diphenhydrAMINE 25 milliGRAM(s) Oral every 6 hours PRN Rash and/or Itching  hydrALAZINE Injectable 10 milliGRAM(s) IV Push every 4 hours PRN for systolic bp > 160  melatonin 3 milliGRAM(s) Oral at bedtime PRN Insomnia  metoprolol tartrate Injectable 5 milliGRAM(s) IV Push every 6 hours PRN heart rate sustaining greater than 130  ondansetron Injectable 4 milliGRAM(s) IV Push every 8 hours PRN Nausea and/or Vomiting  oxyCODONE    IR 5 milliGRAM(s) Oral every 4 hours PRN Moderate Pain (4 - 6)      RADIOLOGY & ADDITIONAL TESTS:   AGUEDA LUIS    [id number]    68y      Female    CC: SOB    INTERVAL HPI/OVERNIGHT EVENTS: Pt seen and examined. +Voiding     REVIEW OF SYSTEMS:    CONSTITUTIONAL: No fever, weight loss  RESPIRATORY: No cough, wheezing, hemoptysis  CARDIOVASCULAR: No chest pain, palpitations  GASTROINTESTINAL: No abdominal or epigastric pain. No nausea, vomiting    Vital Signs Last 24 Hrs  T(C): 36.7 (08 Jan 2024 08:10), Max: 36.9 (07 Jan 2024 21:09)  T(F): 98 (08 Jan 2024 08:10), Max: 98.4 (07 Jan 2024 21:09)  HR: 90 (08 Jan 2024 08:31) (67 - 98)  BP: 104/58 (08 Jan 2024 08:10) (92/60 - 123/61)  BP(mean): 63 (07 Jan 2024 16:28) (63 - 63)  RR: 18 (08 Jan 2024 08:10) (18 - 20)  SpO2: 98% (08 Jan 2024 08:31) (94% - 100%)    Parameters below as of 08 Jan 2024 08:31  Patient On (Oxygen Delivery Method): nasal cannula, 4L nc        PHYSICAL EXAM:    GENERAL: NAD  CHEST/LUNG: decreased sounds b/l; respirations unlabored on 4LNC   HEART: S1S2+, Regular rate and rhythm  ABDOMEN: Soft, Nontender, Nondistended; Bowel sounds present  SKIN: warm, dry   NEURO: Awake, alert;   PSYCH: calm, cooperative     LABS:                        11.4   9.07  )-----------( 109      ( 07 Jan 2024 11:30 )             34.2     01-08    140  |  98  |  69.8<H>  ----------------------------<  138<H>  4.1   |  29.0  |  1.52<H>    Ca    9.0      08 Jan 2024 05:34  Phos  3.2     01-07  Mg     2.2     01-08        Urinalysis Basic - ( 08 Jan 2024 05:34 )    Color: x / Appearance: x / SG: x / pH: x  Gluc: 138 mg/dL / Ketone: x  / Bili: x / Urobili: x   Blood: x / Protein: x / Nitrite: x   Leuk Esterase: x / RBC: x / WBC x   Sq Epi: x / Non Sq Epi: x / Bacteria: x          MEDICATIONS  (STANDING):  atorvastatin 80 milliGRAM(s) Oral at bedtime  budesonide  80 MICROgram(s)/formoterol 4.5 MICROgram(s) Inhaler 2 Puff(s) Inhalation two times a day  chlorhexidine 2% Cloths 1 Application(s) Topical <User Schedule>  clotrimazole 1% Cream 1 Application(s) Topical two times a day  dextrose 5%. 1000 milliLiter(s) (100 mL/Hr) IV Continuous <Continuous>  dextrose 5%. 1000 milliLiter(s) (50 mL/Hr) IV Continuous <Continuous>  dextrose 50% Injectable 12.5 Gram(s) IV Push once  dextrose 50% Injectable 25 Gram(s) IV Push once  dextrose 50% Injectable 25 Gram(s) IV Push once  famotidine    Tablet 20 milliGRAM(s) Oral daily  glucagon  Injectable 1 milliGRAM(s) IntraMuscular once  insulin glargine Injectable (LANTUS) 34 Unit(s) SubCutaneous every morning  insulin glargine Injectable (LANTUS) 34 Unit(s) SubCutaneous at bedtime  insulin lispro (ADMELOG) corrective regimen sliding scale   SubCutaneous three times a day before meals  insulin lispro Injectable (ADMELOG) 36 Unit(s) SubCutaneous three times a day before meals  ipratropium    for Nebulization 500 MICROGram(s) Nebulizer every 6 hours  lactulose Syrup 20 Gram(s) Oral daily  levalbuterol Inhalation 0.63 milliGRAM(s) Inhalation every 6 hours  lidocaine   4% Patch 1 Patch Transdermal daily  metoprolol tartrate 75 milliGRAM(s) Oral every 8 hours  multivitamin 1 Tablet(s) Oral daily  nystatin Powder 1 Application(s) Topical two times a day  oxyCODONE  ER Tablet 30 milliGRAM(s) Oral every 12 hours  polyethylene glycol 3350 17 Gram(s) Oral daily  rivaroxaban 15 milliGRAM(s) Oral daily  senna 2 Tablet(s) Oral at bedtime  torsemide 20 milliGRAM(s) Oral daily    MEDICATIONS  (PRN):  acetaminophen     Tablet .. 650 milliGRAM(s) Oral every 6 hours PRN Temp greater or equal to 38C (100.4F), Mild Pain (1 - 3)  aluminum hydroxide/magnesium hydroxide/simethicone Suspension 30 milliLiter(s) Oral once PRN Dyspepsia  bisacodyl Suppository 10 milliGRAM(s) Rectal daily PRN Constipation  dextrose Oral Gel 15 Gram(s) Oral once PRN Blood Glucose LESS THAN 70 milliGRAM(s)/deciliter  diphenhydrAMINE 25 milliGRAM(s) Oral every 6 hours PRN Rash and/or Itching  hydrALAZINE Injectable 10 milliGRAM(s) IV Push every 4 hours PRN for systolic bp > 160  melatonin 3 milliGRAM(s) Oral at bedtime PRN Insomnia  metoprolol tartrate Injectable 5 milliGRAM(s) IV Push every 6 hours PRN heart rate sustaining greater than 130  ondansetron Injectable 4 milliGRAM(s) IV Push every 8 hours PRN Nausea and/or Vomiting  oxyCODONE    IR 5 milliGRAM(s) Oral every 4 hours PRN Moderate Pain (4 - 6)      RADIOLOGY & ADDITIONAL TESTS:

## 2024-01-08 NOTE — PROGRESS NOTE ADULT - PROVIDER SPECIALTY LIST ADULT
Cardiology
Endocrinology
Hospitalist
Nephrology
Cardiology
Cardiology
Endocrinology
Hospitalist
Intervent Cardiology
Nephrology
Cardiology
Endocrinology
Endocrinology
Hospitalist
Infectious Disease
Internal Medicine
Intervent Cardiology
Nephrology
Nephrology
Cardiology
Endocrinology
Hospitalist
Hospitalist
Internal Medicine
Pulmonology
Pulmonology
Endocrinology
Hospitalist
Nephrology
Hospitalist
Hospitalist
Endocrinology
Hospitalist
Infectious Disease
Cardiology
Internal Medicine
Pulmonology
Hospitalist
Pulmonology
Cardiology
Cardiology
Pulmonology
Cardiology

## 2024-01-08 NOTE — PROGRESS NOTE ADULT - ASSESSMENT
68y/oF PMH HTN, HLD, DM2, HFpEF, CKD3, DVT, Uterine CA, COPD on home O2 presenting with SOB from SNF. Had been started on vantin and steroids prior to admission. Pt admitted with acute on chronic respiratory failure 2/2 acute HFpEF exacerbation w/likely superimposed bacterial pna in setting of RSV, severe pHTN, new afib. CT imaging also with pna. now s/p [facility] 12/29 with mild LAD disease, pHTN. Pt weaned off bipap to NC. s/p heparin gtt, on xarelto     Acute on chronic hypoxic respiratory failure   Acute on chronic diastolic HFpEF   Severe pHTN   RML pna, viral 2/2 RSV and likely superimposed gram negative pna   New onset afib w/ RVR   -CT chest reviewed   -s/p [facility] with mild LAD dx, severe pHTN  -s/p course abx   -bcx neg   -s/p steroids   -cont inhalers, nebs  -cont torsemide   -cont nocturnal bipap   -cardio recs appreciated   -pulm recs appreciated   -cont metoprolol 75mg q8h  -prn IV lopressor for sustained HR >130  -EP recs appreciated   -no DCCV given respiratory status   -TTE: LVEF 60-65%, grade 2 ddx    JACQUES on CKD   -nephro recs appreciated     ?urinary retention , resolved  -pt with [facility] on arrival from SNF, denies hx of retention, no recent TOV   -TOV 1/7, f/u bladder scans; +voiding    Hx DVT   -on empiric tx for PE/DVT since august   -LE duplex neg for dvt   -d-dimer wnl  -cont xarelto     chronic pain   -cont pain control, bowel regimen     HLD  -cont statin     COPD   -s/p steroids   -cont inhalers   -pulm recs appreciated     DM  -hgba1c 8.3   -likely exacerbated by steroids   -cont lantus, premeal, iss   -endocrine recs appreciated       vte ppx: xarelto     dispo: [facility]   68y/oF PMH HTN, HLD, DM2, HFpEF, CKD3, DVT, Uterine CA, COPD on home O2 presenting with SOB from SNF. Had been started on vantin and steroids prior to admission. Pt admitted with acute on chronic respiratory failure 2/2 acute HFpEF exacerbation w/likely superimposed bacterial pna in setting of RSV, severe pHTN, new afib. CT imaging also with pna. now s/p [facility] 12/29 with mild LAD disease, pHTN. Pt weaned off bipap to NC. s/p heparin gtt, on xarelto     Acute on chronic hypoxic respiratory failure   Acute on chronic diastolic HFpEF   Severe pHTN   RML pna, viral 2/2 RSV and likely superimposed gram negative pna   New onset afib w/ RVR   -CT chest reviewed   -s/p [facility] with mild LAD dx, severe pHTN  -s/p course abx   -bcx neg   -s/p steroids   -cont inhalers, nebs  -cont torsemide   -cont nocturnal bipap   -cardio recs appreciated   -pulm recs appreciated   -cont metoprolol 75mg q8h  -prn IV lopressor for sustained HR >130  -EP recs appreciated   -no DCCV given respiratory status   -TTE: LVEF 60-65%, grade 2 ddx    JACQUES on CKD   -nephro recs appreciated     ?urinary retention , resolved  -pt with de [facility]re on arrival from SNF, denies hx of retention, no recent TOV   -TOV 1/7, f/u bladder scans; +voiding    Hx DVT   -on empiric tx for PE/DVT since august   -LE duplex neg for dvt   -d-dimer wnl  -cont xarelto     chronic pain   -cont pain control, bowel regimen     HLD  -cont statin     COPD   -s/p steroids   -cont inhalers   -pulm recs appreciated     DM  -hgba1c 8.3   -likely exacerbated by steroids   -cont lantus, premeal, iss   -endocrine recs appreciated       vte ppx: xarelto     dispo: [facility]

## 2024-01-08 NOTE — DISCHARGE NOTE NURSING/CASE MANAGEMENT/SOCIAL WORK - NSDCPEFALRISK_GEN_ALL_CORE
For information on Fall & Injury Prevention, visit: [email or web address] OR  [email or web address] OR  https://www.cdc.gov/steadi/patient.html For information on Fall & Injury Prevention, visit: [email or web address] OR  [email or web address] OR  https://www.cdc.gov/steadi/patient.html

## 2024-01-09 DIAGNOSIS — I48.91 UNSPECIFIED ATRIAL FIBRILLATION: ICD-10-CM

## 2024-01-09 DIAGNOSIS — I50.21 ACUTE SYSTOLIC (CONGESTIVE) HEART FAILURE: ICD-10-CM

## 2024-01-20 ENCOUNTER — INPATIENT (INPATIENT)
Facility: HOSPITAL | Age: 69
LOS: 11 days | Discharge: NURSING, REHAB OR LONG-TERM CARE | DRG: 291 | End: 2024-02-01
Attending: HOSPITALIST | Admitting: HOSPITALIST
Payer: MEDICARE

## 2024-01-20 VITALS
HEART RATE: 109 BPM | DIASTOLIC BLOOD PRESSURE: 63 MMHG | WEIGHT: 293 LBS | HEIGHT: 65 IN | TEMPERATURE: 98 F | OXYGEN SATURATION: 97 % | SYSTOLIC BLOOD PRESSURE: 101 MMHG | RESPIRATION RATE: 24 BRPM

## 2024-01-20 DIAGNOSIS — Z90.49 ACQUIRED ABSENCE OF OTHER SPECIFIED PARTS OF DIGESTIVE TRACT: Chronic | ICD-10-CM

## 2024-01-20 DIAGNOSIS — Z98.890 OTHER SPECIFIED POSTPROCEDURAL STATES: Chronic | ICD-10-CM

## 2024-01-20 DIAGNOSIS — I50.9 HEART FAILURE, UNSPECIFIED: ICD-10-CM

## 2024-01-20 DIAGNOSIS — Z90.710 ACQUIRED ABSENCE OF BOTH CERVIX AND UTERUS: Chronic | ICD-10-CM

## 2024-01-20 LAB
ALBUMIN SERPL ELPH-MCNC: 3.5 G/DL — SIGNIFICANT CHANGE UP (ref 3.3–5.2)
ALP SERPL-CCNC: 113 U/L — SIGNIFICANT CHANGE UP (ref 40–120)
ALT FLD-CCNC: 20 U/L — SIGNIFICANT CHANGE UP
ANION GAP SERPL CALC-SCNC: 13 MMOL/L — SIGNIFICANT CHANGE UP (ref 5–17)
AST SERPL-CCNC: 16 U/L — SIGNIFICANT CHANGE UP
BASE EXCESS BLDV CALC-SCNC: 6.9 MMOL/L — HIGH (ref -2–3)
BASOPHILS # BLD AUTO: 0 K/UL — SIGNIFICANT CHANGE UP (ref 0–0.2)
BASOPHILS NFR BLD AUTO: 0 % — SIGNIFICANT CHANGE UP (ref 0–2)
BILIRUB SERPL-MCNC: 0.6 MG/DL — SIGNIFICANT CHANGE UP (ref 0.4–2)
BUN SERPL-MCNC: 59 MG/DL — HIGH (ref 8–20)
CA-I SERPL-SCNC: 1.25 MMOL/L — SIGNIFICANT CHANGE UP (ref 1.15–1.33)
CALCIUM SERPL-MCNC: 9.9 MG/DL — SIGNIFICANT CHANGE UP (ref 8.4–10.5)
CHLORIDE BLDV-SCNC: 97 MMOL/L — SIGNIFICANT CHANGE UP (ref 96–108)
CHLORIDE SERPL-SCNC: 93 MMOL/L — LOW (ref 96–108)
CO2 SERPL-SCNC: 30 MMOL/L — HIGH (ref 22–29)
CREAT SERPL-MCNC: 2.2 MG/DL — HIGH (ref 0.5–1.3)
D DIMER BLD IA.RAPID-MCNC: 240 NG/ML DDU — HIGH
EGFR: 24 ML/MIN/1.73M2 — LOW
EOSINOPHIL # BLD AUTO: 0.44 K/UL — SIGNIFICANT CHANGE UP (ref 0–0.5)
EOSINOPHIL NFR BLD AUTO: 5.2 % — SIGNIFICANT CHANGE UP (ref 0–6)
FLUAV AG NPH QL: SIGNIFICANT CHANGE UP
FLUBV AG NPH QL: SIGNIFICANT CHANGE UP
GAS PNL BLDV: 135 MMOL/L — LOW (ref 136–145)
GAS PNL BLDV: SIGNIFICANT CHANGE UP
GLUCOSE BLDC GLUCOMTR-MCNC: 246 MG/DL — HIGH (ref 70–99)
GLUCOSE BLDV-MCNC: 303 MG/DL — HIGH (ref 70–99)
GLUCOSE SERPL-MCNC: 302 MG/DL — HIGH (ref 70–99)
HCO3 BLDV-SCNC: 33 MMOL/L — HIGH (ref 22–29)
HCT VFR BLD CALC: 37.8 % — SIGNIFICANT CHANGE UP (ref 34.5–45)
HCT VFR BLDA CALC: 36 % — SIGNIFICANT CHANGE UP
HGB BLD CALC-MCNC: 12.1 G/DL — SIGNIFICANT CHANGE UP (ref 11.7–16.1)
HGB BLD-MCNC: 11.8 G/DL — SIGNIFICANT CHANGE UP (ref 11.5–15.5)
LACTATE BLDV-MCNC: 1.7 MMOL/L — SIGNIFICANT CHANGE UP (ref 0.5–2)
LYMPHOCYTES # BLD AUTO: 2.05 K/UL — SIGNIFICANT CHANGE UP (ref 1–3.3)
LYMPHOCYTES # BLD AUTO: 24.1 % — SIGNIFICANT CHANGE UP (ref 13–44)
MAGNESIUM SERPL-MCNC: 2.2 MG/DL — SIGNIFICANT CHANGE UP (ref 1.8–2.6)
MANUAL SMEAR VERIFICATION: SIGNIFICANT CHANGE UP
MCHC RBC-ENTMCNC: 29.1 PG — SIGNIFICANT CHANGE UP (ref 27–34)
MCHC RBC-ENTMCNC: 31.2 GM/DL — LOW (ref 32–36)
MCV RBC AUTO: 93.3 FL — SIGNIFICANT CHANGE UP (ref 80–100)
MONOCYTES # BLD AUTO: 0.29 K/UL — SIGNIFICANT CHANGE UP (ref 0–0.9)
MONOCYTES NFR BLD AUTO: 3.4 % — SIGNIFICANT CHANGE UP (ref 2–14)
MYELOCYTES NFR BLD: 1.7 % — HIGH (ref 0–0)
NEUTROPHILS # BLD AUTO: 5.49 K/UL — SIGNIFICANT CHANGE UP (ref 1.8–7.4)
NEUTROPHILS NFR BLD AUTO: 64.7 % — SIGNIFICANT CHANGE UP (ref 43–77)
NT-PROBNP SERPL-SCNC: 3373 PG/ML — HIGH (ref 0–300)
OVALOCYTES BLD QL SMEAR: SLIGHT — SIGNIFICANT CHANGE UP
PCO2 BLDV: 66 MMHG — HIGH (ref 39–42)
PH BLDV: 7.31 — LOW (ref 7.32–7.43)
PLAT MORPH BLD: NORMAL — SIGNIFICANT CHANGE UP
PLATELET # BLD AUTO: 250 K/UL — SIGNIFICANT CHANGE UP (ref 150–400)
PO2 BLDV: 45 MMHG — SIGNIFICANT CHANGE UP (ref 25–45)
POLYCHROMASIA BLD QL SMEAR: SLIGHT — SIGNIFICANT CHANGE UP
POTASSIUM BLDV-SCNC: 4.4 MMOL/L — SIGNIFICANT CHANGE UP (ref 3.5–5.1)
POTASSIUM SERPL-MCNC: 4.2 MMOL/L — SIGNIFICANT CHANGE UP (ref 3.5–5.3)
POTASSIUM SERPL-SCNC: 4.2 MMOL/L — SIGNIFICANT CHANGE UP (ref 3.5–5.3)
PROT SERPL-MCNC: 6.2 G/DL — LOW (ref 6.6–8.7)
RBC # BLD: 4.05 M/UL — SIGNIFICANT CHANGE UP (ref 3.8–5.2)
RBC # FLD: 14.6 % — HIGH (ref 10.3–14.5)
RBC BLD AUTO: ABNORMAL
RSV RNA NPH QL NAA+NON-PROBE: SIGNIFICANT CHANGE UP
SAO2 % BLDV: 66.9 % — SIGNIFICANT CHANGE UP
SARS-COV-2 RNA SPEC QL NAA+PROBE: DETECTED
SODIUM SERPL-SCNC: 136 MMOL/L — SIGNIFICANT CHANGE UP (ref 135–145)
TROPONIN T, HIGH SENSITIVITY RESULT: 185 NG/L — HIGH (ref 0–51)
VARIANT LYMPHS # BLD: 0.9 % — SIGNIFICANT CHANGE UP (ref 0–6)
WBC # BLD: 8.49 K/UL — SIGNIFICANT CHANGE UP (ref 3.8–10.5)
WBC # FLD AUTO: 8.49 K/UL — SIGNIFICANT CHANGE UP (ref 3.8–10.5)

## 2024-01-20 PROCEDURE — 71045 X-RAY EXAM CHEST 1 VIEW: CPT | Mod: 26

## 2024-01-20 PROCEDURE — 99285 EMERGENCY DEPT VISIT HI MDM: CPT

## 2024-01-20 PROCEDURE — 99223 1ST HOSP IP/OBS HIGH 75: CPT

## 2024-01-20 PROCEDURE — 99222 1ST HOSP IP/OBS MODERATE 55: CPT

## 2024-01-20 PROCEDURE — 71250 CT THORAX DX C-: CPT | Mod: 26

## 2024-01-20 RX ORDER — DEXTROSE 50 % IN WATER 50 %
12.5 SYRINGE (ML) INTRAVENOUS ONCE
Refills: 0 | Status: DISCONTINUED | OUTPATIENT
Start: 2024-01-20 | End: 2024-01-21

## 2024-01-20 RX ORDER — INSULIN LISPRO 100/ML
VIAL (ML) SUBCUTANEOUS AT BEDTIME
Refills: 0 | Status: DISCONTINUED | OUTPATIENT
Start: 2024-01-20 | End: 2024-01-21

## 2024-01-20 RX ORDER — IPRATROPIUM/ALBUTEROL SULFATE 18-103MCG
3 AEROSOL WITH ADAPTER (GRAM) INHALATION ONCE
Refills: 0 | Status: COMPLETED | OUTPATIENT
Start: 2024-01-20 | End: 2024-01-20

## 2024-01-20 RX ORDER — DEXTROSE 50 % IN WATER 50 %
25 SYRINGE (ML) INTRAVENOUS ONCE
Refills: 0 | Status: DISCONTINUED | OUTPATIENT
Start: 2024-01-20 | End: 2024-01-21

## 2024-01-20 RX ORDER — OXYCODONE HYDROCHLORIDE 5 MG/1
10 TABLET ORAL EVERY 12 HOURS
Refills: 0 | Status: DISCONTINUED | OUTPATIENT
Start: 2024-01-20 | End: 2024-01-21

## 2024-01-20 RX ORDER — SODIUM CHLORIDE 9 MG/ML
1000 INJECTION, SOLUTION INTRAVENOUS
Refills: 0 | Status: DISCONTINUED | OUTPATIENT
Start: 2024-01-20 | End: 2024-01-21

## 2024-01-20 RX ORDER — OXYCODONE HYDROCHLORIDE 5 MG/1
0.5 TABLET ORAL
Qty: 0 | Refills: 0 | DISCHARGE

## 2024-01-20 RX ORDER — RIVAROXABAN 15 MG-20MG
15 KIT ORAL DAILY
Refills: 0 | Status: DISCONTINUED | OUTPATIENT
Start: 2024-01-21 | End: 2024-01-21

## 2024-01-20 RX ORDER — INSULIN LISPRO 100/ML
30 VIAL (ML) SUBCUTANEOUS
Refills: 0 | Status: DISCONTINUED | OUTPATIENT
Start: 2024-01-20 | End: 2024-01-21

## 2024-01-20 RX ORDER — ATORVASTATIN CALCIUM 80 MG/1
80 TABLET, FILM COATED ORAL AT BEDTIME
Refills: 0 | Status: DISCONTINUED | OUTPATIENT
Start: 2024-01-20 | End: 2024-02-01

## 2024-01-20 RX ORDER — LANOLIN ALCOHOL/MO/W.PET/CERES
3 CREAM (GRAM) TOPICAL AT BEDTIME
Refills: 0 | Status: DISCONTINUED | OUTPATIENT
Start: 2024-01-20 | End: 2024-02-01

## 2024-01-20 RX ORDER — OXYCODONE HYDROCHLORIDE 5 MG/1
1 TABLET ORAL
Refills: 0 | DISCHARGE

## 2024-01-20 RX ORDER — DEXTROSE 50 % IN WATER 50 %
15 SYRINGE (ML) INTRAVENOUS ONCE
Refills: 0 | Status: DISCONTINUED | OUTPATIENT
Start: 2024-01-20 | End: 2024-01-21

## 2024-01-20 RX ORDER — BUMETANIDE 0.25 MG/ML
2 INJECTION INTRAMUSCULAR; INTRAVENOUS EVERY 12 HOURS
Refills: 0 | Status: DISCONTINUED | OUTPATIENT
Start: 2024-01-20 | End: 2024-01-23

## 2024-01-20 RX ORDER — ONDANSETRON 8 MG/1
4 TABLET, FILM COATED ORAL EVERY 8 HOURS
Refills: 0 | Status: DISCONTINUED | OUTPATIENT
Start: 2024-01-20 | End: 2024-02-01

## 2024-01-20 RX ORDER — METOPROLOL TARTRATE 50 MG
75 TABLET ORAL EVERY 8 HOURS
Refills: 0 | Status: DISCONTINUED | OUTPATIENT
Start: 2024-01-20 | End: 2024-01-21

## 2024-01-20 RX ORDER — ALPRAZOLAM 0.25 MG
0.25 TABLET ORAL EVERY 8 HOURS
Refills: 0 | Status: DISCONTINUED | OUTPATIENT
Start: 2024-01-20 | End: 2024-01-27

## 2024-01-20 RX ORDER — GLUCAGON INJECTION, SOLUTION 0.5 MG/.1ML
1 INJECTION, SOLUTION SUBCUTANEOUS ONCE
Refills: 0 | Status: DISCONTINUED | OUTPATIENT
Start: 2024-01-20 | End: 2024-01-21

## 2024-01-20 RX ORDER — FUROSEMIDE 40 MG
40 TABLET ORAL
Refills: 0 | Status: DISCONTINUED | OUTPATIENT
Start: 2024-01-20 | End: 2024-01-20

## 2024-01-20 RX ORDER — SENNA PLUS 8.6 MG/1
2 TABLET ORAL AT BEDTIME
Refills: 0 | Status: DISCONTINUED | OUTPATIENT
Start: 2024-01-20 | End: 2024-02-01

## 2024-01-20 RX ORDER — INSULIN GLARGINE 100 [IU]/ML
30 INJECTION, SOLUTION SUBCUTANEOUS EVERY MORNING
Refills: 0 | Status: DISCONTINUED | OUTPATIENT
Start: 2024-01-20 | End: 2024-01-21

## 2024-01-20 RX ORDER — INSULIN LISPRO 100/ML
VIAL (ML) SUBCUTANEOUS
Refills: 0 | Status: DISCONTINUED | OUTPATIENT
Start: 2024-01-20 | End: 2024-01-21

## 2024-01-20 RX ORDER — ACETAMINOPHEN 500 MG
650 TABLET ORAL EVERY 6 HOURS
Refills: 0 | Status: DISCONTINUED | OUTPATIENT
Start: 2024-01-20 | End: 2024-02-01

## 2024-01-20 RX ORDER — INSULIN GLARGINE 100 [IU]/ML
30 INJECTION, SOLUTION SUBCUTANEOUS AT BEDTIME
Refills: 0 | Status: DISCONTINUED | OUTPATIENT
Start: 2024-01-20 | End: 2024-01-21

## 2024-01-20 RX ORDER — LIDOCAINE 4 G/100G
1 CREAM TOPICAL DAILY
Refills: 0 | Status: DISCONTINUED | OUTPATIENT
Start: 2024-01-20 | End: 2024-02-01

## 2024-01-20 RX ORDER — FUROSEMIDE 40 MG
40 TABLET ORAL ONCE
Refills: 0 | Status: COMPLETED | OUTPATIENT
Start: 2024-01-20 | End: 2024-01-20

## 2024-01-20 RX ORDER — FAMOTIDINE 10 MG/ML
20 INJECTION INTRAVENOUS DAILY
Refills: 0 | Status: DISCONTINUED | OUTPATIENT
Start: 2024-01-20 | End: 2024-02-01

## 2024-01-20 RX ORDER — INSULIN GLARGINE 100 [IU]/ML
20 INJECTION, SOLUTION SUBCUTANEOUS ONCE
Refills: 0 | Status: COMPLETED | OUTPATIENT
Start: 2024-01-20 | End: 2024-01-20

## 2024-01-20 RX ORDER — BUDESONIDE AND FORMOTEROL FUMARATE DIHYDRATE 160; 4.5 UG/1; UG/1
2 AEROSOL RESPIRATORY (INHALATION)
Refills: 0 | Status: DISCONTINUED | OUTPATIENT
Start: 2024-01-20 | End: 2024-02-01

## 2024-01-20 RX ORDER — LACTULOSE 10 G/15ML
20 SOLUTION ORAL DAILY
Refills: 0 | Status: DISCONTINUED | OUTPATIENT
Start: 2024-01-20 | End: 2024-02-01

## 2024-01-20 RX ORDER — OXYCODONE HYDROCHLORIDE 5 MG/1
5 TABLET ORAL EVERY 6 HOURS
Refills: 0 | Status: DISCONTINUED | OUTPATIENT
Start: 2024-01-20 | End: 2024-01-24

## 2024-01-20 RX ORDER — METOPROLOL TARTRATE 50 MG
75 TABLET ORAL DAILY
Refills: 0 | Status: DISCONTINUED | OUTPATIENT
Start: 2024-01-20 | End: 2024-01-20

## 2024-01-20 RX ADMIN — BUMETANIDE 2 MILLIGRAM(S): 0.25 INJECTION INTRAMUSCULAR; INTRAVENOUS at 19:40

## 2024-01-20 RX ADMIN — Medication 3 MILLILITER(S): at 16:42

## 2024-01-20 RX ADMIN — Medication 40 MILLIGRAM(S): at 19:40

## 2024-01-20 RX ADMIN — INSULIN GLARGINE 20 UNIT(S): 100 INJECTION, SOLUTION SUBCUTANEOUS at 23:56

## 2024-01-20 NOTE — H&P ADULT - ASSESSMENT
67 y/o female with PMH of HTN, HLD, DM2, HFpEF, CKD3, DVT, Uterine CA, afib on Xarelto, COPD on home O2 4L, chronic lymphedema sent from [facility] to the ED for  chest pain. Patient reported pressure pain located on the left side, non-radiating, associated with shortness of breath. In the ED, VBG done, respiratory acidosis, BiPAP offered but patient reportedly declined. Lasix given for possible CHF ex; cardiology consulted.     Chest pain   Admit to telemetry   Troponin: 185  ECG: afib   Patient recently cath, cardiology rec to trend cardiac enzymes     Shortness of breath  Multifactorial etiology: COPD, CHF and morbid obesity   VBG noted with hypercapnia, prior ABG with same   BiPAP offered in the ED, as per record, patient declined   Lasix 40mg once given in the ED   Cardiology started Bumex 1mg bid (1 dose given)   Torsemide 20mg home med on hold while on Bumex  Patient on oxygen 4L (home dose)   Symbicort bid   Will continue Duoneb, albuterol PRN     HTN/HLD   Atorvastatin 80mg   Lopressor 75mg tid with holding parameters     Afib   Xarelto 15mg daily   Lopressor 75mg tid with holding parameters     DM-2   Lantus 34 units bid (will give 30 while in the ED, adjust as needed)   Humalog 36 unit tid   Insulin sliding scale            67 y/o female with PMH of HTN, HLD, DM2, HFpEF, CKD3, DVT, Uterine CA, afib on Xarelto, COPD on home O2 4L, chronic lymphedema sent from [facility] to the ED for  chest pain. Patient reported pressure pain located on the left side, non-radiating, associated with shortness of breath. In the ED, VBG done, respiratory acidosis, BiPAP offered but patient reportedly declined. Lasix given for possible CHF ex; cardiology consulted.     Chest pain rule out ACS   Admit to telemetry   Troponin: 185  ECG: afib   Patient recently cath, cardiology rec to trend cardiac enzymes     Shortness of breath  Multifactorial etiology: COPD, CHFpEF and morbid obesity   VBG noted with hypercapnia, prior ABG with same   BiPAP offered in the ED, as per record, patient declined   Lasix 40mg once given in the ED   Cardiology started Bumex 1mg bid (1 dose given)   Torsemide 20mg home med on hold while on Bumex  Patient on oxygen 4L (home dose)   Symbicort bid   Will continue Duoneb, albuterol PRN     CKD-3   Cr. 2.22 (was 1.57 prior to discharge on last admission)   Patient on diuretic chronically   Monitor renal function     HTN/HLD   Atorvastatin 80mg   Lopressor 75mg tid with holding parameters     Afib   Xarelto 15mg daily   Lopressor 75mg tid with holding parameters     DM-2   Lantus 34 units bid (will give 30 while in the ED, adjust as needed)   Humalog 36 unit tid (30 units ordered, adjust as needed)   Insulin sliding scale     Hx of spinal stenosis  Oxycodone 5mg PRN   Oxycodone ER 10mg bid   Lidocaine patch daily     Morbid obesity   Lifestyle modification   Fall/aspiration precaution     Supportive   DVT prophylaxis: On Xarelto   Diet: DASH/CHO     Plan of care discussed with patient and ER nurse.        67 y/o female with PMH of HTN, HLD, DM2, HFpEF, CKD3, DVT, Uterine CA, afib on Xarelto, COPD on home O2 4L, chronic lymphedema sent from [facility] to the ED for  chest pain. Patient reported pressure pain located on the left side, non-radiating, associated with shortness of breath. In the ED, VBG done, respiratory acidosis, BiPAP offered but patient reportedly declined. Lasix given for possible CHF ex; cardiology consulted.     Chest pain rule out ACS   Admit to telemetry   Troponin: 185  ECG: afib   Patient recently cath, cardiology rec to trend cardiac enzymes     Shortness of breath  Multifactorial etiology: COPD, CHFpEF and morbid obesity   VBG noted with hypercapnia, prior ABG with same   BiPAP offered in the ED, as per record, patient declined   Lasix 40mg once given in the ED   Cardiology started Bumex 2mg bid (1 dose given)   Torsemide 20mg home med on hold while on Bumex  Patient on oxygen 4L (home dose)   Symbicort bid   Will continue Duoneb    CKD-3   Cr. 2.22 (was 1.57 prior to discharge on last admission)   Patient on diuretic chronically   Monitor renal function     HTN/HLD   Atorvastatin 80mg   Lopressor 75mg tid with holding parameters     Afib   Xarelto 15mg daily   Lopressor 75mg tid with holding parameters     DM-2   Lantus 34 units bid (will give 30 while in the ED, adjust as needed)   Humalog 36 unit tid (30 units ordered, adjust as needed)   Insulin sliding scale     Hx of spinal stenosis  Oxycodone 5mg PRN   Oxycodone ER 10mg bid   Lidocaine patch daily     Morbid obesity   Lifestyle modification   Fall/aspiration precaution     Supportive   DVT prophylaxis: On Xarelto   Diet: DASH/CHO     Plan of care discussed with patient and ER nurse.

## 2024-01-20 NOTE — CONSULT NOTE ADULT - NS ATTEND AMEND GEN_ALL_CORE FT
Patient was seen and examined and appears to be volume overloaded bilateral lower extremity edema and + COVID     68y/oF PMH HTN, HLD, DM2, HFpEF (LVEF 60-65%), morbid obesity CKD3, DVT, Uterine CA, COPD on home O2 presenting with chest pain/SOB     1) Acute on chronic decompensated HFpEF (LVEF 60-65%)   - patient appears volume overloaded with bilateral lower extremity edema   - CXR not able to visualize the lungs and due to obesity unable to get good lung window and will refer for CT chest non contrast   - start Bumex 2 mg IVP q12hrs and will monitor I/Os keep K ~ 4 and Mag ~ 2   - would likely benefit from PPNIV   - is COVID +   - no broderick for ischemic eval in 12/29/2023 and no evidence of CAD     2) Atrial Fibrillation with RVR   -rate control with BB, metoprolol tartrate 25mg Q 8 hours   -pt would likely benefit from rhythm control, will discuss with EP team for recs   -?MOJGAN/DCCV candidate once stable    Sandra Kennedy D.O. [facility]  Cardiology/Vascular Cardiology -[facility]   Telephone # [phone]

## 2024-01-20 NOTE — CONSULT NOTE ADULT - SUBJECTIVE AND OBJECTIVE BOX
[facility]                                                               Office:  [address] [address]                                                              Telephone: [phone]. Fax:[phone]                                                                        CARDIOLOGY CONSULTATION NOTE                                                                                             Consult requested by:  Dr. Rooney   Reason for Consultation: chest pain/sob   Primary Cardiology: Hemal Ge   History obtained by: Patient and medical record   obtained: No    Chief complaint:    Patient is a 68y old  Female who presents with a chief complaint of chest pain       HPI: 68y/oF PMH HTN, HLD, DM2, HFpEF, morbid obesity CKD3, DVT, Uterine CA, COPD on home O2 presenting with chest pain/SOB from [facility]. Pt recently discharged 2023 after being admitted with acute on chronic respiratory failure 2/2 acute HFpEF exacerbation, new Afib RVR started on Xarelto, and RSV. She had Right and left heart cath  which revealed extremely elevated right heart pressures, severe pHTN 67/25 mean 40, mild LAD nonobstructive disease.         REVIEW OF SYMPTOMS:     CONSTITUTIONAL: No fever, weight loss, or fatigue  ENMT:  No difficulty hearing, tinnitus, vertigo; No sinus or throat pain  NECK: No pain or stiffness  CARDIOVASCULAR: No chest pain, dyspnea, syncope, palpitations, dizziness, Orthopnea, Paroxsymal nocturnal dyspnea  RESPIRATORY: No Dyspnea on exertion, Shortness of breath, cough, wheezing  : No dysuria, no hematuria   GI: No dark color stool, no melena, no diarrhea, no constipation, no abdominal pain   NEURO: No headache, no dizziness, no slurred speech   MUSCULOSKELETAL: No joint pain or swelling; No muscle, back, or extremity pain  PSYCH: No agitation, no anxiety.    ALL OTHER REVIEW OF SYSTEMS ARE NEGATIVE.      PREVIOUS DIAGNOSTIC TESTING  ECHO FINDINGS:   < from: TTE Limited W or WO Ultrasound Enhancing Agent (24 @ 10:48) >     CONCLUSIONS:      1. Limited study to evaluate for effusion.   2. Technically difficult image quality.   3. No significant pericardial effusion seen in limited views.    ________________________________________________________________________________________  FINDINGS:     Pericardium:  No pericardial effusion seen.     Systemic Veins:  The inferior vena cava is dilated measuring 2.30 cm in diameter, (dilated >2.1cm) with abnormal inspiratory collapse (abnormal <50%) consistent with elevated right atrial pressure (~15, range 10-20mmHg).  ____________________________________________________________________  QUANTITATIVE DATA:  Left Ventricle Measurements: (Indexed to BSA)     MV E Vmax:    0.98 m/s  E/e' lateral: 15.40       Mitral Valve Measurements:     MV E Vmax: 1.0 m/s       Tricuspid Valve Measurements:     RA Pressure: 15 mmHg    ________________________________________________________________________________________  Electronically signed on 1/3/2024 at 9:35:28 AM by Tal Calixto MD       < end of copied text >    < from: TTE W or WO Ultrasound Enhancing Agent (12.18.23 @ 13:36) >  CONCLUSIONS:      1. Technically difficult image quality.   2. Normal biventricular systolic function.   3. Compared to the transthoracic echocardiogram performed on 2023.    ________________________________________________________________________________________  FINDINGS:     Left Ventricle:  The left ventricle was not well visualized. The left ventricular cavity is normal. Left ventricular wall thickness is normal. Left ventricular systolic function is normal with an ejection fraction visually estimated at 60 to 65%. There is moderate (grade 2) left ventricular diastolic dysfunction, with normal filling pressure.     Right Ventricle:  The right ventricle is not well visualized. The right ventricular cavity is normal in size, normal wall thickness and normal systolic function.     Left Atrium:  The left atrium was not well visualized, and the LA volume could not be calculated. The left atrium is normal.     Right Atrium:  The right atrium was not well visualized. The right atrium is normal in size.     Interatrial Septum:  The interatrial septum appears intact.     Aortic Valve:  The aortic valve was not well visualized. There is no evidence of aortic regurgitation.     Mitral Valve:  The mitral valve was not well visualized. There is moderate calcification of the mitral valve annulus. There is moderate leaflet calcification. There is trace mitral regurgitation.     Tricuspid Valve:  The tricuspid valve was not well visualized.     Pulmonic Valve:  The pulmonic valve was not well visualized.     Aorta:  The aorta is not well visualized. The aortic root appears normal in size.     Pericardium:  No pericardial effusion seen.     Systemic Veins:  The inferior vena cava is normal in size (normal <2.1cm) with normal inspiratory collapse (normal >50%) consistent with normal right atrial pressure (~3, range 0-5mmHg).  ____________________________________________________________________  QUANTITATIVE DATA:  Left Ventricle Measurements: (Indexed to BSA)     Visualized LV EF%: 60 to 65%     MV E Vmax:    0.94 m/s  MV A Vmax:    0.69 m/s  MV E/A:       1.36  e' lateral:   3.56 cm/s  e' medial:    4.40 cm/s  E/e' lateral: 26.29  E/e' medial:  21.27  E/e' Average: 23.52  MV DT:        225 msec       LVOT / RVOT/ Qp/Qs Data: (Indexed to BSA)  LVOT Vmax: 1.28 m/s    Aortic Valve Measurements:  AV Vmax:          1.5 m/s  AV Peak Gradient: 8.8 mmHg    Mitral Valve Measurements:     MV E Vmax: 0.9 m/s  MV A Vmax: 0.7 m/s  MV E/A:    1.4       Tricuspid Valve Measurements:     RA Pressure: 3 mmHg    ________________________________________________________________________________________  Electronically signed on 2023 at 5:07:04 PM by Ramu Bustamante MD    < end of copied text >      < from: Cardiac Catheterization (23 @ 16:05) >  Left Heart Cath   A 5FR FR4   IMPULSE was successfully advanced across the aortic valve,  placed in the left ventricle and pressures were  recorded. Ventriculography was performed using 20 ml of contrast.    Right Heart Cath   Moderate to Severe Elevation of Right Heart Pressures:   RA=13 mmHg; RV=67/13 mmHg; PCWP=20 mmHg; PVR=3.8 JUNIOR   Severe Combined Pre- and Post-capillary Pul HTN=67/25-40 mmHg    Borderline CO=5.3L/min and CI=2.0 L/min/m2   Normal Hailey=3.2   Borderline =1.0 W   Measurements of pressures were obtained.      Diagnostic Findings:     Coronary Angiography   The coronary circulation is right dominant.      LM   Left main artery: The segment is visually normal in size and  structure. Angiography shows no disease. LUZ MARIA Flow 3.    LAD   Left anterior descending artery: Angiography shows minor  irregularities. LUZ MARIA Flow 3. First diagonal: Angiography shows minor  irregularities. LUZ MARIA Flow 3.      CX   Circumflex: Angiography shows minor irregularities. LUZ MARIA Flow 3. First  obtuse marginal: Angiography shows minor irregularities.  LUZ MARIA Flow 3.      RCA   Right coronary artery: The segment is large, dominant. Angiography  shows minor irregularities. LUZ MARIA Flow 3.    < end of copied text >    STRESS FINDINGS:      CATHETERIZATION FINDINGS:         ALLERGIES: Allergies    adhesives (Rash)  latex (Rash)  wool- rash, itch (Other)  Bactrim (Flushing)    Intolerances          PAST MEDICAL HISTORY  Diabetes Mellitus Type II  HTN (Hypertension)  Endometrial Hyperplasia  Cervical Stenosis of Spine  Spinal Stenosis, Lumbar  Deep Vein Thrombosis (DVT)  Dyslipidemia  Cataract  Morbid Obesity  Vitamin D deficiency  Edema of lower extremity  Insomnia  CKD (chronic kidney disease)  Narrow angle glaucoma of left eye  Other fecal abnormalities  Congestive heart failure  Uterine cancer  Asthma  On home oxygen therapy  Gait difficulty        PAST SURGICAL HISTORY  Cataract extracted with lens implant     C Section     Cholecystectomy/appendectomy @ age 25    D&C x2     D&C 2008    Cervical Spinal Stenosis surgery x2 (2002, 2002)    Tonsillectomy as a child    Endometrial biopsy 09    H/O laser iridotomy    H/O colonoscopy    S/P total abdominal hysterectomy and bilateral salpingo-oophorectomy    S/P appendectomy    S/P cholecystectomy        FAMILY HISTORY:  Family history of pancreatic cancer    Family history of bladder cancer    Family history of lung cancer (Grandparent)        SOCIAL HISTORY:  Denies smoking/alcohol/drugs  CIGARETTES:     ALCOHOL:  DRUGS:      CURRENT MEDICATIONS:  furosemide   Injectable 40 milliGRAM(s) IV Push once           HOME MEDICATIONS:      Vital Signs Last 24 Hrs  T(C): 36.7 (2024 14:34), Max: 36.7 (2024 14:34)  T(F): 98.1 (2024 14:34), Max: 98.1 (2024 14:34)  HR: 109 (2024 14:34) (109 - 109)  BP: 101/63 (2024 14:34) (101/63 - 101/63)  RR: 24 (2024 14:34) (24 - 24)  SpO2: 97% (2024 14:34) (97% - 97%)    Parameters below as of 2024 14:34  Patient On (Oxygen Delivery Method): nasal cannula  O2 Flow (L/min): 4        PHYSICAL EXAM:  Constitutional: mild distress due to respiratory status and tachycardia   HEENT: Atraumatic and normocephalic , no bruit JVD   CNS: A&Ox3. No focal deficits. EOMI. Cranial nerves II-IX are intact.   Respiratory: mild wheeze, diminished   Cardiovascular: S1S2 RRR. No murmur/rubs or gallop.  Gastrointestinal: Soft non-tender obese, dependant edema right sided abdomen   Extremities: lymphedema, wheeping edema bilat, 2+ bilat feet ankle   Psychiatric: Calm . no agitation.  Skin: No skin rash/ulcers visualized to face, hands or feet.    Intake and output:     LABS:                        11.8   8.49  )-----------( 250      ( 2024 16:29 )             37.8     01-    136  |  93<L>  |  59.0<H>  ----------------------------<  302<H>  4.2   |  30.0<H>  |  2.20<H>    Ca    9.9      2024 16:29  Mg     2.2     -    TPro  6.2<L>  /  Alb  3.5  /  TBili  0.6  /  DBili  x   /  AST  16  /  ALT  20  /  AlkPhos  113  01-        BNP 3337    Urinalysis Basic - ( 2024 16:29 )    Color: x / Appearance: x / SG: x / pH: x  Gluc: 302 mg/dL / Ketone: x  / Bili: x / Urobili: x   Blood: x / Protein: x / Nitrite: x   Leuk Esterase: x / RBC: x / WBC x   Sq Epi: x / Non Sq Epi: x / Bacteria: x        INTERPRETATION OF TELEMETRY: Reviewed by me. Rates   ECbpm Afib RVR     RADIOLOGY & ADDITIONAL STUDIES:    X-ray:  reviewed by me.

## 2024-01-20 NOTE — ED PROVIDER NOTE - ATTENDING CONTRIBUTION TO CARE
68y/oF PMH HTN, HLD, DM2, HFpEF, CKD3, New diagnosis of afib on rivaroxaban, COPD on home O2, morbid obesity sent from [facility] for chest pain starting earlier this morning. A/w sob at times. Denies fever, chills, nausea, vomiting, abd pain   Ap - ekg with afib @105 patient ranging in low 100s on monitor. recent prolonged hospitalization. will get ddimer to eval and cards consult concerning for acs vs demand from afib

## 2024-01-20 NOTE — ED PROVIDER NOTE - OBJECTIVE STATEMENT
68/F, CHF, Afib 68y/oF PMH HTN, HLD, DM2, HFpEF, CKD3, DVT, Uterine CA, COPD on home O2 presenting with chest pain which started in the AM from [facility]. Pt was recently admitted 68y/oF PMH HTN, HLD, DM2, HFpEF, CKD3, DVT, Uterine CA, COPD on home O2, hronic lymphedema presenting with chest pain which started in the AM from [facility]. Pt was recently admitted for acute on chronic hypoxic respiratory failure 2/2 CHF, RSV and new onset Afib. Pt reports of chest pressure which began earlier this morning associated with some SOB. Non radiating, not associated with nausea and palpitations or confusion. Chest pain is non-pleuritic. No fever, chills 68y/oF PMH HTN, HLD, DM2, HFpEF, CKD3, DVT, Uterine CA, New onset afib on rivaroxaban, COPD on home O2, hronic lymphedema presenting with chest pain which started in the AM from [facility]. Pt was recently admitted for acute on chronic hypoxic respiratory failure 2/2 CHF, RSV and new onset Afib. Pt reports of chest pressure which began earlier this morning associated with some SOB. Non radiating, not associated with nausea and palpitations or confusion. Chest pain is non-pleuritic. No fever, chills

## 2024-01-20 NOTE — CONSULT NOTE ADULT - ASSESSMENT
68y/oF PMH HTN, HLD, DM2, HFpEF, morbid obesity CKD3, DVT, Uterine CA, COPD on home O2 presenting with chest pain/SOB from SNF. Pt recently discharged 1/8/2023 after being admitted with acute on chronic respiratory failure 2/2 acute HFpEF exacerbation, new Afib RVR started on Xarelto, and RSV. She had Right and left heart cath 12/29 which revealed extremely elevated right heart pressures, severe pHTN 67/25 mean 40, mild LAD nonobstructive disease.     1. SOB likely multifactorial COPD exacerbation and Acute on Chronic Diastolic Heart Failure, NICM, JACQUES on CKD   -Admit to tele, elevated trops 185, doubt ACS given recent LHC but can trend enzymes   -IV diuresis Lasix 40mg IV BID with close monitoring of renal indices, strict I&O's   -would likely benefit from PPNIV   -consider Advanced HF consult on Monday for co-management   -TTE 1/4 unchanged EF 60%      2. Atrial Fibrillation with RVR   -rate control with BB, metoprolol tartrate 75mg Q 8 hours   -pt would likely benefit from rhythm control, will discuss with EP team for recs   -?MOJGAN/DCCV candidate once stable      3. HLD   -DASH diet  -statin: Lipitor 80mg QHS     Plan of care to be discussed with Dr. Kennedy

## 2024-01-20 NOTE — ED PROVIDER NOTE - CLINICAL SUMMARY MEDICAL DECISION MAKING FREE TEXT BOX
68y/oF PMH HTN, HLD, DM2, HFpEF, CKD3, DVT, Uterine CA, COPD on home O2, hronic lymphedema presenting with chest pain which started in the AM from [facility]. Pt was recently admitted for acute on chronic hypoxic respiratory failure 2/2 CHF, RSV and new onset Afib. Pt reports of chest pressure which began earlier this morning associated with some SOB. Blood work, chest xray and VBG ordered. VBG with respiratory acidosis and Co2 of 66. 68y/oF PMH HTN, HLD, DM2, HFpEF, CKD3, DVT, Uterine CA, COPD on home O2, hronic lymphedema presenting with chest pain which started in the AM from [facility]. Pt was recently admitted for acute on chronic hypoxic respiratory failure 2/2 CHF, RSV and new onset Afib. Pt reports of chest pressure which began earlier this morning associated with some SOB. Blood work, chest xray and VBG ordered. VBG with respiratory acidosis and Co2 of 66. BNP and troponin T elevated, cardiology consulted, pt treated for acute on chronic CHF exacerbation. Given single dose of IV lasix 40mg, trialed on Bipap for CHF exacerbation and hypercapneic RF. Duonebsx1 administered, PO Metoprolol 75mg single dose.

## 2024-01-20 NOTE — ED ADULT NURSE NOTE - NSFALLRISKINTERV_ED_ALL_ED

## 2024-01-20 NOTE — ED PROVIDER NOTE - PHYSICAL EXAMINATION
Const: Awake, alert and oriented. In no acute distress. Super obese  HEENT: NC/AT. Moist mucous membranes.  Eyes: No scleral icterus. EOMI.  Neck:. Soft and supple. Full ROM without pain.  Cardiac: Regular rate and regular rhythm. +S1/S2. Peripheral pulses 2+ and symmetric.  LE edema+  Resp: Speaking in full sentences. Could not be assessed due to body habitus  Abd: Soft, non-tender, non-distended. Normal bowel sounds in all 4 quadrants. No guarding or rebound.  Back: Spine midline and non-tender. No CVAT.  Skin: No rashes, abrasions or lacerations.  Lymph: No cervical lymphadenopathy.  Neuro: Awake, alert & oriented x 3. Moves all extremities symmetrically.

## 2024-01-20 NOTE — H&P ADULT - CARDIOVASCULAR
regular rate and rhythm/S1 S2 present/no pedal edema S1 S2 present/Irregularly irregular rhythm/pedal edema

## 2024-01-20 NOTE — H&P ADULT - SKIN COMMENTS
venous chronic changes on b/l LE venous chronic changes on b/l LE; erythema, non-tender, not warm to touch.

## 2024-01-20 NOTE — ED PROVIDER NOTE - NSICDXPASTMEDICALHX_GEN_ALL_CORE_FT
PAST MEDICAL HISTORY:  Asthma     Cataract     Cervical Stenosis of Spine     CKD (chronic kidney disease) ~ III    Congestive heart failure ~ HFpEF    Deep Vein Thrombosis (DVT) Left leg, 2004, treated and resolved    Diabetes Mellitus Type II     Dyslipidemia     Endometrial Hyperplasia     Gait difficulty ~ u ses walker    Insomnia     Morbid Obesity     On home oxygen therapy     Spinal Stenosis, Lumbar     Uterine cancer     Vitamin D deficiency

## 2024-01-20 NOTE — ED ADULT NURSE NOTE - OBJECTIVE STATEMENT
Patient is alert and oriented from home. Patient comes into the ER with chest pressure. Patient has a PPMH of recent cardiac cath. and recent hospitalization with RSV and pneumonia is now on 4L NC.

## 2024-01-20 NOTE — H&P ADULT - TIME BILLING
Chart, labs and imaging reviewed. Physical examination, medication reconciliation and documentation. Discussion with patient and ER nurse.

## 2024-01-20 NOTE — ED ADULT TRIAGE NOTE - CHIEF COMPLAINT QUOTE
patient with chest pressure hx of recent cardiac cath. and recent hospitalization with RSV and pneumonia is now on 4L NC.

## 2024-01-20 NOTE — H&P ADULT - HISTORY OF PRESENT ILLNESS
Patient seen and examined before midnight.     69 y/o female with PMH of HTN, HLD, DM2, HFpEF, CKD3, DVT, Uterine CA, afib on Xarelto, COPD on home O2 4L, chronic lymphedema sent from [facility] to the ED for  chest pain. Patient reported pressure pain located on the left side, non-radiating, associated with shortness of breath. Patient has no nausea, vomiting, diaphoresis, fever, chills, palpitation, abdominal pain, change in bowel/urinary habit, HA. Of note, she was recently admitted and treated for CHF exacerbation, RSV and found to have new onset afib.

## 2024-01-20 NOTE — H&P ADULT - NSHPPHYSICALEXAM_GEN_ALL_CORE
Vital Signs Last 24 Hrs  T(C): 36.6 (20 Jan 2024 19:16), Max: 36.7 (20 Jan 2024 14:34)  T(F): 97.8 (20 Jan 2024 19:16), Max: 98.1 (20 Jan 2024 14:34)  HR: 113 (20 Jan 2024 21:17) (109 - 113)  BP: 95/51 (20 Jan 2024 21:17) (95/51 - 188/95)  BP(mean): --  RR: 18 (20 Jan 2024 21:17) (18 - 24)  SpO2: 99% (20 Jan 2024 21:17) (97% - 99%)    Parameters below as of 20 Jan 2024 21:17  Patient On (Oxygen Delivery Method): nasal cannula  O2 Flow (L/min): 4

## 2024-01-21 DIAGNOSIS — E66.01 MORBID (SEVERE) OBESITY DUE TO EXCESS CALORIES: ICD-10-CM

## 2024-01-21 DIAGNOSIS — I50.32 CHRONIC DIASTOLIC (CONGESTIVE) HEART FAILURE: ICD-10-CM

## 2024-01-21 DIAGNOSIS — J18.9 PNEUMONIA, UNSPECIFIED ORGANISM: ICD-10-CM

## 2024-01-21 DIAGNOSIS — I48.91 UNSPECIFIED ATRIAL FIBRILLATION: ICD-10-CM

## 2024-01-21 DIAGNOSIS — U07.1 COVID-19: ICD-10-CM

## 2024-01-21 LAB
ANION GAP SERPL CALC-SCNC: 16 MMOL/L — SIGNIFICANT CHANGE UP (ref 5–17)
BUN SERPL-MCNC: 56.5 MG/DL — HIGH (ref 8–20)
CALCIUM SERPL-MCNC: 10 MG/DL — SIGNIFICANT CHANGE UP (ref 8.4–10.5)
CHLORIDE SERPL-SCNC: 95 MMOL/L — LOW (ref 96–108)
CO2 SERPL-SCNC: 26 MMOL/L — SIGNIFICANT CHANGE UP (ref 22–29)
CREAT SERPL-MCNC: 1.74 MG/DL — HIGH (ref 0.5–1.3)
EGFR: 31 ML/MIN/1.73M2 — LOW
GLUCOSE BLDC GLUCOMTR-MCNC: 205 MG/DL — HIGH (ref 70–99)
GLUCOSE BLDC GLUCOMTR-MCNC: 218 MG/DL — HIGH (ref 70–99)
GLUCOSE BLDC GLUCOMTR-MCNC: 220 MG/DL — HIGH (ref 70–99)
GLUCOSE BLDC GLUCOMTR-MCNC: 228 MG/DL — HIGH (ref 70–99)
GLUCOSE SERPL-MCNC: 225 MG/DL — HIGH (ref 70–99)
HCT VFR BLD CALC: 38.9 % — SIGNIFICANT CHANGE UP (ref 34.5–45)
HGB BLD-MCNC: 12.7 G/DL — SIGNIFICANT CHANGE UP (ref 11.5–15.5)
MAGNESIUM SERPL-MCNC: 2 MG/DL — SIGNIFICANT CHANGE UP (ref 1.6–2.6)
MCHC RBC-ENTMCNC: 30.4 PG — SIGNIFICANT CHANGE UP (ref 27–34)
MCHC RBC-ENTMCNC: 32.6 GM/DL — SIGNIFICANT CHANGE UP (ref 32–36)
MCV RBC AUTO: 93.1 FL — SIGNIFICANT CHANGE UP (ref 80–100)
PLATELET # BLD AUTO: 208 K/UL — SIGNIFICANT CHANGE UP (ref 150–400)
POTASSIUM SERPL-MCNC: 4.5 MMOL/L — SIGNIFICANT CHANGE UP (ref 3.5–5.3)
POTASSIUM SERPL-SCNC: 4.5 MMOL/L — SIGNIFICANT CHANGE UP (ref 3.5–5.3)
RBC # BLD: 4.18 M/UL — SIGNIFICANT CHANGE UP (ref 3.8–5.2)
RBC # FLD: 14.6 % — HIGH (ref 10.3–14.5)
SODIUM SERPL-SCNC: 137 MMOL/L — SIGNIFICANT CHANGE UP (ref 135–145)
TROPONIN T, HIGH SENSITIVITY RESULT: 150 NG/L — HIGH (ref 0–51)
WBC # BLD: 7.75 K/UL — SIGNIFICANT CHANGE UP (ref 3.8–10.5)
WBC # FLD AUTO: 7.75 K/UL — SIGNIFICANT CHANGE UP (ref 3.8–10.5)

## 2024-01-21 PROCEDURE — 99233 SBSQ HOSP IP/OBS HIGH 50: CPT

## 2024-01-21 PROCEDURE — 99232 SBSQ HOSP IP/OBS MODERATE 35: CPT

## 2024-01-21 RX ORDER — INSULIN LISPRO 100/ML
25 VIAL (ML) SUBCUTANEOUS
Refills: 0 | Status: DISCONTINUED | OUTPATIENT
Start: 2024-01-21 | End: 2024-01-21

## 2024-01-21 RX ORDER — ALBUTEROL 90 UG/1
2 AEROSOL, METERED ORAL EVERY 6 HOURS
Refills: 0 | Status: DISCONTINUED | OUTPATIENT
Start: 2024-01-21 | End: 2024-02-01

## 2024-01-21 RX ORDER — OXYCODONE HYDROCHLORIDE 5 MG/1
20 TABLET ORAL EVERY 12 HOURS
Refills: 0 | Status: DISCONTINUED | OUTPATIENT
Start: 2024-01-21 | End: 2024-01-25

## 2024-01-21 RX ORDER — REMDESIVIR 5 MG/ML
200 INJECTION INTRAVENOUS EVERY 24 HOURS
Refills: 0 | Status: COMPLETED | OUTPATIENT
Start: 2024-01-21 | End: 2024-01-21

## 2024-01-21 RX ORDER — INSULIN GLARGINE 100 [IU]/ML
35 INJECTION, SOLUTION SUBCUTANEOUS EVERY MORNING
Refills: 0 | Status: DISCONTINUED | OUTPATIENT
Start: 2024-01-21 | End: 2024-01-24

## 2024-01-21 RX ORDER — RIVAROXABAN 15 MG-20MG
20 KIT ORAL
Refills: 0 | Status: DISCONTINUED | OUTPATIENT
Start: 2024-01-21 | End: 2024-02-01

## 2024-01-21 RX ORDER — DEXTROSE 50 % IN WATER 50 %
25 SYRINGE (ML) INTRAVENOUS ONCE
Refills: 0 | Status: DISCONTINUED | OUTPATIENT
Start: 2024-01-21 | End: 2024-02-01

## 2024-01-21 RX ORDER — HYDROMORPHONE HYDROCHLORIDE 2 MG/ML
0.5 INJECTION INTRAMUSCULAR; INTRAVENOUS; SUBCUTANEOUS ONCE
Refills: 0 | Status: DISCONTINUED | OUTPATIENT
Start: 2024-01-21 | End: 2024-01-21

## 2024-01-21 RX ORDER — IPRATROPIUM/ALBUTEROL SULFATE 18-103MCG
3 AEROSOL WITH ADAPTER (GRAM) INHALATION EVERY 6 HOURS
Refills: 0 | Status: DISCONTINUED | OUTPATIENT
Start: 2024-01-21 | End: 2024-01-21

## 2024-01-21 RX ORDER — INSULIN LISPRO 100/ML
VIAL (ML) SUBCUTANEOUS
Refills: 0 | Status: DISCONTINUED | OUTPATIENT
Start: 2024-01-21 | End: 2024-02-01

## 2024-01-21 RX ORDER — SODIUM CHLORIDE 9 MG/ML
1000 INJECTION, SOLUTION INTRAVENOUS
Refills: 0 | Status: DISCONTINUED | OUTPATIENT
Start: 2024-01-21 | End: 2024-02-01

## 2024-01-21 RX ORDER — OXYCODONE HYDROCHLORIDE 5 MG/1
10 TABLET ORAL EVERY 6 HOURS
Refills: 0 | Status: DISCONTINUED | OUTPATIENT
Start: 2024-01-21 | End: 2024-01-28

## 2024-01-21 RX ORDER — DEXAMETHASONE 0.5 MG/5ML
6 ELIXIR ORAL DAILY
Refills: 0 | Status: DISCONTINUED | OUTPATIENT
Start: 2024-01-21 | End: 2024-01-25

## 2024-01-21 RX ORDER — INSULIN GLARGINE 100 [IU]/ML
35 INJECTION, SOLUTION SUBCUTANEOUS AT BEDTIME
Refills: 0 | Status: DISCONTINUED | OUTPATIENT
Start: 2024-01-21 | End: 2024-01-24

## 2024-01-21 RX ORDER — DEXTROSE 50 % IN WATER 50 %
12.5 SYRINGE (ML) INTRAVENOUS ONCE
Refills: 0 | Status: DISCONTINUED | OUTPATIENT
Start: 2024-01-21 | End: 2024-02-01

## 2024-01-21 RX ORDER — GLUCAGON INJECTION, SOLUTION 0.5 MG/.1ML
1 INJECTION, SOLUTION SUBCUTANEOUS ONCE
Refills: 0 | Status: DISCONTINUED | OUTPATIENT
Start: 2024-01-21 | End: 2024-02-01

## 2024-01-21 RX ORDER — DEXTROSE 50 % IN WATER 50 %
15 SYRINGE (ML) INTRAVENOUS ONCE
Refills: 0 | Status: DISCONTINUED | OUTPATIENT
Start: 2024-01-21 | End: 2024-02-01

## 2024-01-21 RX ORDER — METOPROLOL TARTRATE 50 MG
25 TABLET ORAL EVERY 8 HOURS
Refills: 0 | Status: DISCONTINUED | OUTPATIENT
Start: 2024-01-21 | End: 2024-01-22

## 2024-01-21 RX ORDER — REMDESIVIR 5 MG/ML
INJECTION INTRAVENOUS
Refills: 0 | Status: COMPLETED | OUTPATIENT
Start: 2024-01-21 | End: 2024-01-25

## 2024-01-21 RX ORDER — REMDESIVIR 5 MG/ML
100 INJECTION INTRAVENOUS EVERY 24 HOURS
Refills: 0 | Status: COMPLETED | OUTPATIENT
Start: 2024-01-22 | End: 2024-01-25

## 2024-01-21 RX ADMIN — Medication 25 MILLIGRAM(S): at 12:30

## 2024-01-21 RX ADMIN — OXYCODONE HYDROCHLORIDE 10 MILLIGRAM(S): 5 TABLET ORAL at 22:47

## 2024-01-21 RX ADMIN — HYDROMORPHONE HYDROCHLORIDE 0.5 MILLIGRAM(S): 2 INJECTION INTRAMUSCULAR; INTRAVENOUS; SUBCUTANEOUS at 01:00

## 2024-01-21 RX ADMIN — FAMOTIDINE 20 MILLIGRAM(S): 10 INJECTION INTRAVENOUS at 12:30

## 2024-01-21 RX ADMIN — SENNA PLUS 2 TABLET(S): 8.6 TABLET ORAL at 22:08

## 2024-01-21 RX ADMIN — Medication 1 TABLET(S): at 12:30

## 2024-01-21 RX ADMIN — OXYCODONE HYDROCHLORIDE 20 MILLIGRAM(S): 5 TABLET ORAL at 18:31

## 2024-01-21 RX ADMIN — Medication 5: at 18:22

## 2024-01-21 RX ADMIN — Medication 5: at 12:31

## 2024-01-21 RX ADMIN — HYDROMORPHONE HYDROCHLORIDE 0.5 MILLIGRAM(S): 2 INJECTION INTRAMUSCULAR; INTRAVENOUS; SUBCUTANEOUS at 00:35

## 2024-01-21 RX ADMIN — OXYCODONE HYDROCHLORIDE 10 MILLIGRAM(S): 5 TABLET ORAL at 23:37

## 2024-01-21 RX ADMIN — INSULIN GLARGINE 35 UNIT(S): 100 INJECTION, SOLUTION SUBCUTANEOUS at 22:08

## 2024-01-21 RX ADMIN — OXYCODONE HYDROCHLORIDE 20 MILLIGRAM(S): 5 TABLET ORAL at 18:22

## 2024-01-21 RX ADMIN — RIVAROXABAN 20 MILLIGRAM(S): KIT at 18:22

## 2024-01-21 RX ADMIN — OXYCODONE HYDROCHLORIDE 10 MILLIGRAM(S): 5 TABLET ORAL at 09:07

## 2024-01-21 RX ADMIN — REMDESIVIR 200 MILLIGRAM(S): 5 INJECTION INTRAVENOUS at 12:30

## 2024-01-21 RX ADMIN — INSULIN GLARGINE 35 UNIT(S): 100 INJECTION, SOLUTION SUBCUTANEOUS at 09:07

## 2024-01-21 RX ADMIN — LACTULOSE 20 GRAM(S): 10 SOLUTION ORAL at 12:30

## 2024-01-21 RX ADMIN — ATORVASTATIN CALCIUM 80 MILLIGRAM(S): 80 TABLET, FILM COATED ORAL at 22:08

## 2024-01-21 NOTE — PROGRESS NOTE ADULT - ASSESSMENT
68y/oF PMH HTN, HLD, DM2, HFpEF, morbid obesity CKD3, DVT, Uterine CA, COPD on home O2 presenting with chest pain/SOB from [facility]. Pt recently discharged 1/8/2023 after being admitted with acute on chronic respiratory failure 2/2 acute HFpEF exacerbation, new Afib RVR started on Xarelto, and RSV. She had Right and left heart cath 12/29 which revealed extremely elevated right heart pressures, severe pHTN 67/25 mean 40, mild LAD nonobstructive disease.     1. SOB likely multifactorial COPD exacerbation and Acute on Chronic Diastolic Heart Failure, NICM, JACQUES on CKD       2. Atrial Fibrillation with RVR       3. HLD   -DASH diet  -statin: Lipitor 80mg QHS     Plan of care to be discussed with Dr. Kennedy

## 2024-01-21 NOTE — PROGRESS NOTE ADULT - PROBLEM SELECTOR PLAN 1
- rate control with BB, metoprolol tartrate 75mg Q 8 hours   - rates driven by infection   - treat underlying infection   - pt would likely benefit from rhythm control, will discuss with EP team for recs   - EP eval for potential MOJGAN/DCCV before discharge  - xarelto for AC

## 2024-01-21 NOTE — PATIENT PROFILE ADULT - FALL HARM RISK - HARM RISK INTERVENTIONS

## 2024-01-21 NOTE — PROGRESS NOTE ADULT - SUBJECTIVE AND OBJECTIVE BOX
[facility]                                                         CARDIOLOGY AT [place] [address]                                                         Telephone: [phone]. Fax:[phone]                                                                             PROGRESS NOTE    Chief Complaint upon presentation to hospital: chest pain and shortness of breath   Initial reason for Consult: Afib RVR   Reason for follow up: Afib RVR, HFpEF  Last 24h Telemetry: Afib +  Overall Plan: rate control    Review of symptoms:   Cardiac:  No chest pain. No dyspnea. No palpitations.  Respiratory: no cough. No dyspnea  Gastrointestinal: No diarrhea. No abdominal pain. No bleeding.   Neuro: No focal neuro complaints.    Vitals:  T(C): 36.3 (01-21-24 @ 10:23), Max: 36.7 (01-20-24 @ 14:34)  HR: 100 (01-21-24 @ 10:23) (96 - 124)  BP: 125/75 (01-21-24 @ 10:23) (89/46 - 188/95)  RR: 20 (01-21-24 @ 07:50) (18 - 24)  SpO2: 98% (01-21-24 @ 10:23) (91% - 100%)  Wt(kg): --  I&O's Summary    20 Jan 2024 07:01  -  21 Jan 2024 07:00  --------------------------------------------------------  IN: 0 mL / OUT: 1000 mL / NET: -1000 mL      Weight (kg): 161.5 (01-20 @ 14:34)    PHYSICAL EXAM:  Appearance: Comfortable. No acute distress  HEENT:  Atraumatic. Normocephalic.  Normal oral mucosa  Neurologic: A & O x 3, no gross focal deficits.  Cardiovascular: RRR S1 S2, No murmur, no rubs/gallops. No JVD  Respiratory: Lungs clear to auscultation, unlabored   Gastrointestinal:  Soft, Non-tender, + BS  Lower Extremities: 2+ Peripheral Pulses, No clubbing, cyanosis, or edema  Psychiatry: Patient is calm. No agitation.   Skin: warm and dry.    CURRENT CARDIAC MEDICATIONS:  buMETAnide Injectable 2 milliGRAM(s) IV Push every 12 hours  metoprolol tartrate 25 milliGRAM(s) Oral every 8 hours      CURRENT OTHER MEDICATIONS:  albuterol    90 MICROgram(s) HFA Inhaler 2 Puff(s) Inhalation every 6 hours PRN Bronchospasm  budesonide  80 MICROgram(s)/formoterol 4.5 MICROgram(s) Inhaler 2 Puff(s) Inhalation two times a day  remdesivir  IVPB 200 milliGRAM(s) IV Intermittent every 24 hours  remdesivir  IVPB   IV Intermittent   acetaminophen     Tablet .. 650 milliGRAM(s) Oral every 6 hours PRN Temp greater or equal to 38C (100.4F), Mild Pain (1 - 3)  ALPRAZolam 0.25 milliGRAM(s) Oral every 8 hours PRN for anxiety/Sleep  melatonin 3 milliGRAM(s) Oral at bedtime PRN Insomnia  ondansetron Injectable 4 milliGRAM(s) IV Push every 8 hours PRN Nausea and/or Vomiting  oxyCODONE    IR 5 milliGRAM(s) Oral every 6 hours PRN Moderate Pain (4 - 6)  oxyCODONE    IR 10 milliGRAM(s) Oral every 6 hours PRN Severe Pain (7 - 10)  oxyCODONE  ER Tablet 20 milliGRAM(s) Oral every 12 hours  aluminum hydroxide/magnesium hydroxide/simethicone Suspension 30 milliLiter(s) Oral every 4 hours PRN Dyspepsia  famotidine    Tablet 20 milliGRAM(s) Oral daily  lactulose Syrup 20 Gram(s) Oral daily  senna 2 Tablet(s) Oral at bedtime  atorvastatin 80 milliGRAM(s) Oral at bedtime  dexAMETHasone     Tablet 6 milliGRAM(s) Oral daily, Stop order after: 10 Days  dextrose 50% Injectable 12.5 Gram(s) IV Push once, Stop order after: 1 Doses  dextrose 50% Injectable 25 Gram(s) IV Push once, Stop order after: 1 Doses  dextrose 50% Injectable 25 Gram(s) IV Push once, Stop order after: 1 Doses  dextrose Oral Gel 15 Gram(s) Oral once, Stop order after: 1 Doses PRN Blood Glucose LESS THAN 70 milliGRAM(s)/deciliter  glucagon  Injectable 1 milliGRAM(s) IntraMuscular once, Stop order after: 1 Doses  insulin glargine Injectable (LANTUS) 35 Unit(s) SubCutaneous every morning  insulin glargine Injectable (LANTUS) 35 Unit(s) SubCutaneous at bedtime  insulin lispro (ADMELOG) corrective regimen sliding scale   SubCutaneous three times a day before meals  insulin lispro Injectable (ADMELOG) 25 Unit(s) SubCutaneous three times a day before meals  dextrose 5%. 1000 milliLiter(s) (100 mL/Hr) IV Continuous <Continuous>  dextrose 5%. 1000 milliLiter(s) (50 mL/Hr) IV Continuous <Continuous>  lidocaine   4% Patch 1 Patch Transdermal daily  multivitamin 1 Tablet(s) Oral daily  rivaroxaban 20 milliGRAM(s) Oral with dinner      LABS:	 	                            12.7   7.75  )-----------( 208      ( 21 Jan 2024 06:23 )             38.9     01-21    137  |  95<L>  |  56.5<H>  ----------------------------<  225<H>  4.5   |  26.0  |  1.74<H>    Ca    10.0      21 Jan 2024 04:36  Mg     2.0     01-21    TPro  6.2<L>  /  Alb  3.5  /  TBili  0.6  /  DBili  x   /  AST  16  /  ALT  20  /  AlkPhos  113  01-20    PT/INR/PTT ( 30 Dec 2023 06:11 )                       :                       :      X            :       26.5                  .        .                   .              .           .       X           .                                       Lipid Profile:   HgA1c:   TSH:

## 2024-01-21 NOTE — PROGRESS NOTE ADULT - ASSESSMENT
67 y/o female with PMH of HTN, HLD, DM2, HFpEF, CKD3, DVT, Uterine CA, afib on Xarelto, COPD on home O2 4L, chronic lymphedema sent from [facility] to the ED for  chest pain. Patient reported pressure pain located on the left side, non-radiating, associated with shortness of breath. In the ED, VBG done, respiratory acidosis, BiPAP offered but patient reportedly declined. Lasix given for possible CHF ex; cardiology consulted.     Chest pain rule out ACS   Admit to telemetry   Patient recently cath, cardiology rec to trend cardiac enzymes   cardio following    Shortness of breath  Multifactorial etiology: COPD, CHFpEF and morbid obesity   VBG noted with hypercapnia, prior ABG with same   BiPAP offered in the ED, as per record, patient declined   Cardiology started Bumex 2mg bid IV  Patient on oxygen 4L (home dose)   Symbicort bid   start remdesivir and decadron  albuterol prn   HF consult in am     CKD-3   cr improved  Patient on diuretic chronically   Monitor renal function     HTN/HLD   Atorvastatin 80mg   Lopressor 25mg tid with holding parameters     Afib   Xarelto 20mg daily   Lopressor 25mg tid with holding parameters   ep consult in am     DM-2   Lantus bid and ssi     Hx of spinal stenosis  Lidocaine patch daily   cosnult pain manaegment in am     Morbid obesity   Lifestyle modification   Fall/aspiration precaution     Supportive   DVT prophylaxis: On Xarelto     pt consult

## 2024-01-21 NOTE — PROGRESS NOTE ADULT - SUBJECTIVE AND OBJECTIVE BOX
AGUEDA LUIS    [id number]    68y      Female    INTERVAL HPI/OVERNIGHT EVENTS:  patient being seen for covid, chf and ckd. Patient seen at bedside and complains of pain .     REVIEW OF SYSTEMS:    CONSTITUTIONAL: No fever, weight loss, or fatigue  RESPIRATORY: No cough, wheezing, hemoptysis; No shortness of breath  CARDIOVASCULAR: No chest pain, palpitations  GASTROINTESTINAL: No abdominal or epigastric pain. No nausea, vomiting  NEUROLOGICAL: No headaches, memory loss, loss of strength.  MISCELLANEOUS:      Vital Signs Last 24 Hrs  T(C): 36.3 (21 Jan 2024 10:23), Max: 36.7 (20 Jan 2024 14:34)  T(F): 97.3 (21 Jan 2024 10:23), Max: 98.1 (20 Jan 2024 14:34)  HR: 103 (21 Jan 2024 12:20) (96 - 124)  BP: 127/78 (21 Jan 2024 12:20) (89/46 - 188/95)  BP(mean): --  RR: 20 (21 Jan 2024 07:50) (18 - 24)  SpO2: 98% (21 Jan 2024 10:23) (91% - 100%)    Parameters below as of 21 Jan 2024 07:50  Patient On (Oxygen Delivery Method): nasal cannula  O2 Flow (L/min): 4      PHYSICAL EXAM:    · Constitutional	well-groomed; obese  · Eyes	PERRL; EOMI; conjunctiva clear  · Respiratory	no wheezes; no respiratory distress; respirations non-labored  · Cardiovascular	S1 S2 present; Irregularly irregular rhythm; pedal edema  · Pedal Edema Severity	chronic lymphedema b/l  · Gastrointestinal	soft; nontender; nondistended; normal active bowel sounds  · Neurological	sensation intact; responds to verbal commands  · Skin	warm and dry  · Skin Comments	venous chronic changes on b/l LE; erythema, non-tender, not warm to touch.  · Musculoskeletal	no joint swelling; no joint erythema; no joint warmth; no calf tenderness  · Psychiatric	normal affect; alert and oriented x3; normal behavior      LABS:                        12.7   7.75  )-----------( 208      ( 21 Jan 2024 06:23 )             38.9     01-21    137  |  95<L>  |  56.5<H>  ----------------------------<  225<H>  4.5   |  26.0  |  1.74<H>    Ca    10.0      21 Jan 2024 04:36  Mg     2.0     01-21    TPro  6.2<L>  /  Alb  3.5  /  TBili  0.6  /  DBili  x   /  AST  16  /  ALT  20  /  AlkPhos  113  01-20      Urinalysis Basic - ( 21 Jan 2024 04:36 )    Color: x / Appearance: x / SG: x / pH: x  Gluc: 225 mg/dL / Ketone: x  / Bili: x / Urobili: x   Blood: x / Protein: x / Nitrite: x   Leuk Esterase: x / RBC: x / WBC x   Sq Epi: x / Non Sq Epi: x / Bacteria: x          MEDICATIONS  (STANDING):  atorvastatin 80 milliGRAM(s) Oral at bedtime  budesonide  80 MICROgram(s)/formoterol 4.5 MICROgram(s) Inhaler 2 Puff(s) Inhalation two times a day  buMETAnide Injectable 2 milliGRAM(s) IV Push every 12 hours  dexAMETHasone     Tablet 6 milliGRAM(s) Oral daily  dextrose 5%. 1000 milliLiter(s) (50 mL/Hr) IV Continuous <Continuous>  dextrose 5%. 1000 milliLiter(s) (100 mL/Hr) IV Continuous <Continuous>  dextrose 50% Injectable 25 Gram(s) IV Push once  dextrose 50% Injectable 12.5 Gram(s) IV Push once  dextrose 50% Injectable 25 Gram(s) IV Push once  famotidine    Tablet 20 milliGRAM(s) Oral daily  glucagon  Injectable 1 milliGRAM(s) IntraMuscular once  insulin glargine Injectable (LANTUS) 35 Unit(s) SubCutaneous every morning  insulin glargine Injectable (LANTUS) 35 Unit(s) SubCutaneous at bedtime  insulin lispro (ADMELOG) corrective regimen sliding scale   SubCutaneous three times a day before meals  lactulose Syrup 20 Gram(s) Oral daily  lidocaine   4% Patch 1 Patch Transdermal daily  metoprolol tartrate 25 milliGRAM(s) Oral every 8 hours  multivitamin 1 Tablet(s) Oral daily  oxyCODONE  ER Tablet 20 milliGRAM(s) Oral every 12 hours  remdesivir  IVPB   IV Intermittent   rivaroxaban 20 milliGRAM(s) Oral with dinner  senna 2 Tablet(s) Oral at bedtime    MEDICATIONS  (PRN):  acetaminophen     Tablet .. 650 milliGRAM(s) Oral every 6 hours PRN Temp greater or equal to 38C (100.4F), Mild Pain (1 - 3)  albuterol    90 MICROgram(s) HFA Inhaler 2 Puff(s) Inhalation every 6 hours PRN Bronchospasm  ALPRAZolam 0.25 milliGRAM(s) Oral every 8 hours PRN for anxiety/Sleep  aluminum hydroxide/magnesium hydroxide/simethicone Suspension 30 milliLiter(s) Oral every 4 hours PRN Dyspepsia  dextrose Oral Gel 15 Gram(s) Oral once PRN Blood Glucose LESS THAN 70 milliGRAM(s)/deciliter  melatonin 3 milliGRAM(s) Oral at bedtime PRN Insomnia  ondansetron Injectable 4 milliGRAM(s) IV Push every 8 hours PRN Nausea and/or Vomiting  oxyCODONE    IR 5 milliGRAM(s) Oral every 6 hours PRN Moderate Pain (4 - 6)  oxyCODONE    IR 10 milliGRAM(s) Oral every 6 hours PRN Severe Pain (7 - 10)      RADIOLOGY & ADDITIONAL TESTS:

## 2024-01-21 NOTE — PROGRESS NOTE ADULT - PROBLEM SELECTOR PLAN 2
-Admit to tele, elevated trops 185, doubt ACS given recent LHC  - pt does not appear overloaded on exam despite rise in pBNP from 712--> 3373  - stop IV Bumex and transition to PO   - BIPAP PRN   - advanced heart failure consult on monday for NICM   - monitor volume status w/ dexamethasone   - PRN diuresis   -TTE 1/4 unchanged EF 60% -Admit to tele, elevated trops 185, doubt ACS given recent LHC  - pt does not appear overloaded on exam despite rise in pBNP from 712--> 3373  - c/w Bumex 2mg IVP q 12hrs   - BIPAP PRN   - monitor volume status w/ dexamethasone   - PRN diuresis   -TTE 1/4 unchanged EF 60%

## 2024-01-22 LAB
ALBUMIN SERPL ELPH-MCNC: 3.1 G/DL — LOW (ref 3.3–5.2)
ALP SERPL-CCNC: 103 U/L — SIGNIFICANT CHANGE UP (ref 40–120)
ALT FLD-CCNC: 20 U/L — SIGNIFICANT CHANGE UP
ANION GAP SERPL CALC-SCNC: 13 MMOL/L — SIGNIFICANT CHANGE UP (ref 5–17)
AST SERPL-CCNC: 19 U/L — SIGNIFICANT CHANGE UP
BASOPHILS # BLD AUTO: 0.1 K/UL — SIGNIFICANT CHANGE UP (ref 0–0.2)
BASOPHILS NFR BLD AUTO: 1.3 % — SIGNIFICANT CHANGE UP (ref 0–2)
BILIRUB SERPL-MCNC: 0.6 MG/DL — SIGNIFICANT CHANGE UP (ref 0.4–2)
BUN SERPL-MCNC: 49.3 MG/DL — HIGH (ref 8–20)
CALCIUM SERPL-MCNC: 9.6 MG/DL — SIGNIFICANT CHANGE UP (ref 8.4–10.5)
CHLORIDE SERPL-SCNC: 96 MMOL/L — SIGNIFICANT CHANGE UP (ref 96–108)
CO2 SERPL-SCNC: 29 MMOL/L — SIGNIFICANT CHANGE UP (ref 22–29)
CREAT SERPL-MCNC: 1.58 MG/DL — HIGH (ref 0.5–1.3)
EGFR: 35 ML/MIN/1.73M2 — LOW
EOSINOPHIL # BLD AUTO: 0.19 K/UL — SIGNIFICANT CHANGE UP (ref 0–0.5)
EOSINOPHIL NFR BLD AUTO: 2.5 % — SIGNIFICANT CHANGE UP (ref 0–6)
GLUCOSE BLDC GLUCOMTR-MCNC: 193 MG/DL — HIGH (ref 70–99)
GLUCOSE BLDC GLUCOMTR-MCNC: 367 MG/DL — HIGH (ref 70–99)
GLUCOSE BLDC GLUCOMTR-MCNC: 417 MG/DL — HIGH (ref 70–99)
GLUCOSE BLDC GLUCOMTR-MCNC: 419 MG/DL — HIGH (ref 70–99)
GLUCOSE BLDC GLUCOMTR-MCNC: 497 MG/DL — CRITICAL HIGH (ref 70–99)
GLUCOSE BLDC GLUCOMTR-MCNC: 508 MG/DL — CRITICAL HIGH (ref 70–99)
GLUCOSE SERPL-MCNC: 161 MG/DL — HIGH (ref 70–99)
HCT VFR BLD CALC: 36.1 % — SIGNIFICANT CHANGE UP (ref 34.5–45)
HGB BLD-MCNC: 11.4 G/DL — LOW (ref 11.5–15.5)
IMM GRANULOCYTES NFR BLD AUTO: 5.1 % — HIGH (ref 0–0.9)
LYMPHOCYTES # BLD AUTO: 1.9 K/UL — SIGNIFICANT CHANGE UP (ref 1–3.3)
LYMPHOCYTES # BLD AUTO: 24.8 % — SIGNIFICANT CHANGE UP (ref 13–44)
MAGNESIUM SERPL-MCNC: 2.1 MG/DL — SIGNIFICANT CHANGE UP (ref 1.6–2.6)
MCHC RBC-ENTMCNC: 29.5 PG — SIGNIFICANT CHANGE UP (ref 27–34)
MCHC RBC-ENTMCNC: 31.6 GM/DL — LOW (ref 32–36)
MCV RBC AUTO: 93.5 FL — SIGNIFICANT CHANGE UP (ref 80–100)
MONOCYTES # BLD AUTO: 0.77 K/UL — SIGNIFICANT CHANGE UP (ref 0–0.9)
MONOCYTES NFR BLD AUTO: 10.1 % — SIGNIFICANT CHANGE UP (ref 2–14)
MRSA PCR RESULT.: SIGNIFICANT CHANGE UP
NEUTROPHILS # BLD AUTO: 4.3 K/UL — SIGNIFICANT CHANGE UP (ref 1.8–7.4)
NEUTROPHILS NFR BLD AUTO: 56.2 % — SIGNIFICANT CHANGE UP (ref 43–77)
PLATELET # BLD AUTO: 205 K/UL — SIGNIFICANT CHANGE UP (ref 150–400)
POTASSIUM SERPL-MCNC: 4.1 MMOL/L — SIGNIFICANT CHANGE UP (ref 3.5–5.3)
POTASSIUM SERPL-SCNC: 4.1 MMOL/L — SIGNIFICANT CHANGE UP (ref 3.5–5.3)
PROT SERPL-MCNC: 6 G/DL — LOW (ref 6.6–8.7)
RBC # BLD: 3.86 M/UL — SIGNIFICANT CHANGE UP (ref 3.8–5.2)
RBC # FLD: 14.6 % — HIGH (ref 10.3–14.5)
S AUREUS DNA NOSE QL NAA+PROBE: SIGNIFICANT CHANGE UP
SODIUM SERPL-SCNC: 138 MMOL/L — SIGNIFICANT CHANGE UP (ref 135–145)
TROPONIN T, HIGH SENSITIVITY RESULT: 108 NG/L — HIGH (ref 0–51)
WBC # BLD: 7.65 K/UL — SIGNIFICANT CHANGE UP (ref 3.8–10.5)
WBC # FLD AUTO: 7.65 K/UL — SIGNIFICANT CHANGE UP (ref 3.8–10.5)

## 2024-01-22 PROCEDURE — 99232 SBSQ HOSP IP/OBS MODERATE 35: CPT | Mod: 25

## 2024-01-22 PROCEDURE — 93010 ELECTROCARDIOGRAM REPORT: CPT

## 2024-01-22 PROCEDURE — 99232 SBSQ HOSP IP/OBS MODERATE 35: CPT

## 2024-01-22 RX ORDER — INSULIN LISPRO 100/ML
13 VIAL (ML) SUBCUTANEOUS ONCE
Refills: 0 | Status: COMPLETED | OUTPATIENT
Start: 2024-01-22 | End: 2024-01-22

## 2024-01-22 RX ORDER — METHOCARBAMOL 500 MG/1
750 TABLET, FILM COATED ORAL THREE TIMES A DAY
Refills: 0 | Status: DISCONTINUED | OUTPATIENT
Start: 2024-01-22 | End: 2024-01-22

## 2024-01-22 RX ORDER — CLOTRIMAZOLE AND BETAMETHASONE DIPROPIONATE 10; .5 MG/G; MG/G
1 CREAM TOPICAL
Refills: 0 | Status: DISCONTINUED | OUTPATIENT
Start: 2024-01-22 | End: 2024-02-01

## 2024-01-22 RX ORDER — CHLORHEXIDINE GLUCONATE 213 G/1000ML
1 SOLUTION TOPICAL
Refills: 0 | Status: DISCONTINUED | OUTPATIENT
Start: 2024-01-22 | End: 2024-02-01

## 2024-01-22 RX ORDER — METOPROLOL TARTRATE 50 MG
25 TABLET ORAL EVERY 6 HOURS
Refills: 0 | Status: DISCONTINUED | OUTPATIENT
Start: 2024-01-22 | End: 2024-01-23

## 2024-01-22 RX ORDER — METOPROLOL TARTRATE 50 MG
5 TABLET ORAL EVERY 6 HOURS
Refills: 0 | Status: DISCONTINUED | OUTPATIENT
Start: 2024-01-22 | End: 2024-02-01

## 2024-01-22 RX ORDER — CYCLOBENZAPRINE HYDROCHLORIDE 10 MG/1
5 TABLET, FILM COATED ORAL EVERY 8 HOURS
Refills: 0 | Status: DISCONTINUED | OUTPATIENT
Start: 2024-01-22 | End: 2024-01-24

## 2024-01-22 RX ADMIN — Medication 6 MILLIGRAM(S): at 06:01

## 2024-01-22 RX ADMIN — OXYCODONE HYDROCHLORIDE 20 MILLIGRAM(S): 5 TABLET ORAL at 17:30

## 2024-01-22 RX ADMIN — Medication 0.25 MILLIGRAM(S): at 13:14

## 2024-01-22 RX ADMIN — BUDESONIDE AND FORMOTEROL FUMARATE DIHYDRATE 2 PUFF(S): 160; 4.5 AEROSOL RESPIRATORY (INHALATION) at 23:14

## 2024-01-22 RX ADMIN — Medication 1 TABLET(S): at 12:04

## 2024-01-22 RX ADMIN — BUMETANIDE 2 MILLIGRAM(S): 0.25 INJECTION INTRAMUSCULAR; INTRAVENOUS at 06:02

## 2024-01-22 RX ADMIN — Medication 25 MILLIGRAM(S): at 06:01

## 2024-01-22 RX ADMIN — LACTULOSE 20 GRAM(S): 10 SOLUTION ORAL at 12:04

## 2024-01-22 RX ADMIN — RIVAROXABAN 20 MILLIGRAM(S): KIT at 17:30

## 2024-01-22 RX ADMIN — Medication 25 MILLIGRAM(S): at 17:41

## 2024-01-22 RX ADMIN — Medication 11: at 12:03

## 2024-01-22 RX ADMIN — Medication 3: at 08:26

## 2024-01-22 RX ADMIN — Medication 25 MILLIGRAM(S): at 23:15

## 2024-01-22 RX ADMIN — BUDESONIDE AND FORMOTEROL FUMARATE DIHYDRATE 2 PUFF(S): 160; 4.5 AEROSOL RESPIRATORY (INHALATION) at 08:49

## 2024-01-22 RX ADMIN — SENNA PLUS 2 TABLET(S): 8.6 TABLET ORAL at 23:15

## 2024-01-22 RX ADMIN — OXYCODONE HYDROCHLORIDE 20 MILLIGRAM(S): 5 TABLET ORAL at 05:50

## 2024-01-22 RX ADMIN — FAMOTIDINE 20 MILLIGRAM(S): 10 INJECTION INTRAVENOUS at 12:04

## 2024-01-22 RX ADMIN — INSULIN GLARGINE 35 UNIT(S): 100 INJECTION, SOLUTION SUBCUTANEOUS at 23:14

## 2024-01-22 RX ADMIN — CLOTRIMAZOLE AND BETAMETHASONE DIPROPIONATE 1 APPLICATION(S): 10; .5 CREAM TOPICAL at 17:31

## 2024-01-22 RX ADMIN — Medication 13: at 17:33

## 2024-01-22 RX ADMIN — OXYCODONE HYDROCHLORIDE 20 MILLIGRAM(S): 5 TABLET ORAL at 06:02

## 2024-01-22 RX ADMIN — Medication 25 MILLIGRAM(S): at 12:04

## 2024-01-22 RX ADMIN — REMDESIVIR 200 MILLIGRAM(S): 5 INJECTION INTRAVENOUS at 12:03

## 2024-01-22 RX ADMIN — CYCLOBENZAPRINE HYDROCHLORIDE 5 MILLIGRAM(S): 10 TABLET, FILM COATED ORAL at 23:15

## 2024-01-22 RX ADMIN — ATORVASTATIN CALCIUM 80 MILLIGRAM(S): 80 TABLET, FILM COATED ORAL at 23:15

## 2024-01-22 RX ADMIN — BUMETANIDE 2 MILLIGRAM(S): 0.25 INJECTION INTRAMUSCULAR; INTRAVENOUS at 20:13

## 2024-01-22 RX ADMIN — OXYCODONE HYDROCHLORIDE 20 MILLIGRAM(S): 5 TABLET ORAL at 17:39

## 2024-01-22 RX ADMIN — OXYCODONE HYDROCHLORIDE 10 MILLIGRAM(S): 5 TABLET ORAL at 12:04

## 2024-01-22 RX ADMIN — INSULIN GLARGINE 35 UNIT(S): 100 INJECTION, SOLUTION SUBCUTANEOUS at 08:40

## 2024-01-22 RX ADMIN — OXYCODONE HYDROCHLORIDE 10 MILLIGRAM(S): 5 TABLET ORAL at 13:09

## 2024-01-22 NOTE — PROGRESS NOTE ADULT - ASSESSMENT
67 y/o F PMH HTN, HLD, DM2, HFpEF, morbid obesity CKD3, DVT, Uterine CA, COPD on home O2 presenting with chest pain/SOB from SNF. Pt recently discharged 1/8/2023 after being admitted with acute on chronic respiratory failure 2/2 acute HFpEF exacerbation, new Afib RVR started on Xarelto, and RSV. She had Right and left heart cath 12/29 which revealed extremely elevated right heart pressures, severe pHTN 67/25 mean 40, mild LAD nonobstructive disease. Pt found to be COVID positive. Pt admitted for chest pain/SOB.

## 2024-01-22 NOTE — PROGRESS NOTE ADULT - ASSESSMENT
69 y/o female with PMH of HTN, HLD, DM2, HFpEF, CKD3, DVT, Uterine CA, afib on Xarelto, COPD on home O2 4L, chronic lymphedema sent from [facility] to the ED for  chest pain. Patient reported pressure pain located on the left side, non-radiating, associated with shortness of breath. In the ED, VBG done, respiratory acidosis, BiPAP offered but patient reportedly declined. Lasix given for possible CHF ex; cardiology consulted.       Shortness of breath  Multifactorial etiology: COPD, CHFpEF and morbid obesity   bumex iv  Patient on oxygen 4L (home dose)   Symbicort bid    remdesivir and decadron     afib- needs ep eval as per cardio   xarelto     CKD-3   cr improved  Patient on diuretic chronically   Monitor renal function     HTN/HLD   Atorvastatin 80mg   Lopressor 25mg tid with holding parameters     DM-2   Lantus bid and ssi     Hx of spinal stenosis  Lidocaine patch daily   cosnult pain manaegment in am     Morbid obesity   Lifestyle modification     Supportive   DVT prophylaxis: On Xarelto     patient states she is allergic to robaxin will place on allergy list  - order flexeril

## 2024-01-22 NOTE — PROGRESS NOTE ADULT - SUBJECTIVE AND OBJECTIVE BOX
AGUEDA LUIS    [id number]    68y      Female    INTERVAL HPI/OVERNIGHT EVENTS:  patient being seen for covid and chf. Patient seen at bedside and states feeling better.     has leg itchiness.     REVIEW OF SYSTEMS:    CONSTITUTIONAL: No fever, weight loss, or fatigue  RESPIRATORY: No cough, wheezing, hemoptysis; No shortness of breath  CARDIOVASCULAR: No chest pain, palpitations  GASTROINTESTINAL: No abdominal or epigastric pain. No nausea, vomiting  NEUROLOGICAL: No headaches, memory loss, loss of strength.  MISCELLANEOUS:      Vital Signs Last 24 Hrs  T(C): 36.7 (22 Jan 2024 09:22), Max: 36.7 (21 Jan 2024 20:33)  T(F): 98 (22 Jan 2024 09:22), Max: 98.1 (22 Jan 2024 04:57)  HR: 127 (22 Jan 2024 11:45) (83 - 127)  BP: 118/68 (22 Jan 2024 11:45) (93/59 - 118/68)  BP(mean): --  RR: 20 (22 Jan 2024 04:57) (18 - 20)  SpO2: 96% (22 Jan 2024 09:22) (96% - 97%)    Parameters below as of 22 Jan 2024 09:22  Patient On (Oxygen Delivery Method): nasal cannula  O2 Flow (L/min): 4      PHYSICAL EXAM:    · Constitutional	well-groomed; obese  · Eyes	PERRL; EOMI; conjunctiva clear  · Respiratory	no wheezes; no respiratory distress; respirations non-labored  · Cardiovascular	S1 S2 present; Irregularly irregular rhythm; pedal edema  · Pedal Edema Severity	chronic lymphedema b/l  · Gastrointestinal	soft; nontender; nondistended; normal active bowel sounds  · Neurological	sensation intact; responds to verbal commands  · Skin	warm and dry  · Skin Comments	venous chronic changes on b/l LE; erythema, lymphedema  · Musculoskeletal	no joint swelling; no joint erythema; no joint warmth; no calf tenderness  · Psychiatric	normal affect; alert and oriented x3; normal behavior          LABS:                        11.4   7.65  )-----------( 205      ( 22 Jan 2024 06:12 )             36.1     01-22    138  |  96  |  49.3<H>  ----------------------------<  161<H>  4.1   |  29.0  |  1.58<H>    Ca    9.6      22 Jan 2024 06:12  Mg     2.1     01-22    TPro  6.0<L>  /  Alb  3.1<L>  /  TBili  0.6  /  DBili  x   /  AST  19  /  ALT  20  /  AlkPhos  103  01-22      Urinalysis Basic - ( 22 Jan 2024 06:12 )    Color: x / Appearance: x / SG: x / pH: x  Gluc: 161 mg/dL / Ketone: x  / Bili: x / Urobili: x   Blood: x / Protein: x / Nitrite: x   Leuk Esterase: x / RBC: x / WBC x   Sq Epi: x / Non Sq Epi: x / Bacteria: x          MEDICATIONS  (STANDING):  atorvastatin 80 milliGRAM(s) Oral at bedtime  budesonide  80 MICROgram(s)/formoterol 4.5 MICROgram(s) Inhaler 2 Puff(s) Inhalation two times a day  buMETAnide Injectable 2 milliGRAM(s) IV Push every 12 hours  chlorhexidine 2% Cloths 1 Application(s) Topical <User Schedule>  clotrimazole/betamethasone Cream 1 Application(s) Topical two times a day  dexAMETHasone     Tablet 6 milliGRAM(s) Oral daily  dextrose 5%. 1000 milliLiter(s) (100 mL/Hr) IV Continuous <Continuous>  dextrose 5%. 1000 milliLiter(s) (50 mL/Hr) IV Continuous <Continuous>  dextrose 50% Injectable 12.5 Gram(s) IV Push once  dextrose 50% Injectable 25 Gram(s) IV Push once  dextrose 50% Injectable 25 Gram(s) IV Push once  famotidine    Tablet 20 milliGRAM(s) Oral daily  glucagon  Injectable 1 milliGRAM(s) IntraMuscular once  insulin glargine Injectable (LANTUS) 35 Unit(s) SubCutaneous every morning  insulin glargine Injectable (LANTUS) 35 Unit(s) SubCutaneous at bedtime  insulin lispro (ADMELOG) corrective regimen sliding scale   SubCutaneous three times a day before meals  lactulose Syrup 20 Gram(s) Oral daily  lidocaine   4% Patch 1 Patch Transdermal daily  metoprolol tartrate 25 milliGRAM(s) Oral every 8 hours  multivitamin 1 Tablet(s) Oral daily  oxyCODONE  ER Tablet 20 milliGRAM(s) Oral every 12 hours  remdesivir  IVPB   IV Intermittent   remdesivir  IVPB 100 milliGRAM(s) IV Intermittent every 24 hours  rivaroxaban 20 milliGRAM(s) Oral with dinner  senna 2 Tablet(s) Oral at bedtime    MEDICATIONS  (PRN):  acetaminophen     Tablet .. 650 milliGRAM(s) Oral every 6 hours PRN Temp greater or equal to 38C (100.4F), Mild Pain (1 - 3)  albuterol    90 MICROgram(s) HFA Inhaler 2 Puff(s) Inhalation every 6 hours PRN Bronchospasm  ALPRAZolam 0.25 milliGRAM(s) Oral every 8 hours PRN for anxiety/Sleep  aluminum hydroxide/magnesium hydroxide/simethicone Suspension 30 milliLiter(s) Oral every 4 hours PRN Dyspepsia  cyclobenzaprine 5 milliGRAM(s) Oral every 8 hours PRN Muscle Spasm  dextrose Oral Gel 15 Gram(s) Oral once PRN Blood Glucose LESS THAN 70 milliGRAM(s)/deciliter  melatonin 3 milliGRAM(s) Oral at bedtime PRN Insomnia  ondansetron Injectable 4 milliGRAM(s) IV Push every 8 hours PRN Nausea and/or Vomiting  oxyCODONE    IR 5 milliGRAM(s) Oral every 6 hours PRN Moderate Pain (4 - 6)  oxyCODONE    IR 10 milliGRAM(s) Oral every 6 hours PRN Severe Pain (7 - 10)      RADIOLOGY & ADDITIONAL TESTS:

## 2024-01-22 NOTE — PHYSICAL THERAPY INITIAL EVALUATION ADULT - PERTINENT HX OF CURRENT PROBLEM, REHAB EVAL
7 y/o female with PMH of HTN, HLD, DM2, HFpEF, CKD3, DVT, Uterine CA, afib on Xarelto, COPD on home O2 4L, chronic lymphedema sent from [facility] to the ED for  chest pain. Patient reported pressure pain located on the left side, non-radiating, associated with shortness of breath. Patient has no nausea, vomiting

## 2024-01-22 NOTE — PROGRESS NOTE ADULT - SUBJECTIVE AND OBJECTIVE BOX
[facility] PHYSICIAN [facility]NERS                                                         CARDIOLOGY AT [facility]                                                                  [address] [facility], [place]-[id number] [place] [address]                                                         Telephone: [phone]. Fax:[phone]                                                                             PROGRESS NOTE    Reason for follow up: AFib with RVR   Update: pt reports having SOB and difficulty sleeping. Rate better controlled in the 90s to 110s. Currently on 4L nasal cannula.       Review of symptoms:   Cardiac:  No chest pain. No dyspnea. No palpitations.  Respiratory: no cough. No dyspnea  Gastrointestinal: No diarrhea. No abdominal pain. No bleeding.   Neuro: No focal neuro complaints.    Vitals:  T(C): 36.7 (01-22-24 @ 04:57), Max: 36.7 (01-21-24 @ 20:33)  HR: 83 (01-22-24 @ 08:56) (83 - 112)  BP: 113/62 (01-22-24 @ 04:57) (93/59 - 127/78)  RR: 20 (01-22-24 @ 04:57) (18 - 20)  SpO2: 97% (01-22-24 @ 08:56) (96% - 98%)  Wt(kg): --  I&O's Summary    21 Jan 2024 07:01  -  22 Jan 2024 07:00  --------------------------------------------------------  IN: 0 mL / OUT: 300 mL / NET: -300 mL      Weight (kg): 161.5 (01-20 @ 14:34)    PHYSICAL EXAM:  Appearance: Comfortable. No acute distress, Morbidly obese.   HEENT:  Atraumatic. Normocephalic.  Normal oral mucosa  Neurologic: A & O x 3, no gross focal deficits.  Cardiovascular: Irregularly irregular. No murmur, no rubs/gallops. No JVD  Respiratory: Lungs B/L coarse lung soumds, unlabored. 4L nasal cannula.   Gastrointestinal:  Soft, Non-tender, + BS  Lower Extremities: 2+ Peripheral Pulses, No clubbing, cyanosis, or edema  Psychiatry: Patient is calm. No agitation.   Skin: warm and dry.    CURRENT CARDIAC MEDICATIONS:  buMETAnide Injectable 2 milliGRAM(s) IV Push every 12 hours  metoprolol tartrate 25 milliGRAM(s) Oral every 8 hours      CURRENT OTHER MEDICATIONS:  albuterol    90 MICROgram(s) HFA Inhaler 2 Puff(s) Inhalation every 6 hours PRN Bronchospasm  budesonide  80 MICROgram(s)/formoterol 4.5 MICROgram(s) Inhaler 2 Puff(s) Inhalation two times a day  remdesivir  IVPB   IV Intermittent   remdesivir  IVPB 100 milliGRAM(s) IV Intermittent every 24 hours  acetaminophen     Tablet .. 650 milliGRAM(s) Oral every 6 hours PRN Temp greater or equal to 38C (100.4F), Mild Pain (1 - 3)  ALPRAZolam 0.25 milliGRAM(s) Oral every 8 hours PRN for anxiety/Sleep  melatonin 3 milliGRAM(s) Oral at bedtime PRN Insomnia  ondansetron Injectable 4 milliGRAM(s) IV Push every 8 hours PRN Nausea and/or Vomiting  oxyCODONE    IR 5 milliGRAM(s) Oral every 6 hours PRN Moderate Pain (4 - 6)  oxyCODONE    IR 10 milliGRAM(s) Oral every 6 hours PRN Severe Pain (7 - 10)  oxyCODONE  ER Tablet 20 milliGRAM(s) Oral every 12 hours  aluminum hydroxide/magnesium hydroxide/simethicone Suspension 30 milliLiter(s) Oral every 4 hours PRN Dyspepsia  famotidine    Tablet 20 milliGRAM(s) Oral daily  lactulose Syrup 20 Gram(s) Oral daily  senna 2 Tablet(s) Oral at bedtime  atorvastatin 80 milliGRAM(s) Oral at bedtime  dexAMETHasone     Tablet 6 milliGRAM(s) Oral daily, Stop order after: 10 Days  dextrose 50% Injectable 12.5 Gram(s) IV Push once, Stop order after: 1 Doses  dextrose 50% Injectable 25 Gram(s) IV Push once, Stop order after: 1 Doses  dextrose 50% Injectable 25 Gram(s) IV Push once, Stop order after: 1 Doses  dextrose Oral Gel 15 Gram(s) Oral once, Stop order after: 1 Doses PRN Blood Glucose LESS THAN 70 milliGRAM(s)/deciliter  glucagon  Injectable 1 milliGRAM(s) IntraMuscular once, Stop order after: 1 Doses  insulin glargine Injectable (LANTUS) 35 Unit(s) SubCutaneous every morning  insulin glargine Injectable (LANTUS) 35 Unit(s) SubCutaneous at bedtime  insulin lispro (ADMELOG) corrective regimen sliding scale   SubCutaneous three times a day before meals  dextrose 5%. 1000 milliLiter(s) (100 mL/Hr) IV Continuous <Continuous>  dextrose 5%. 1000 milliLiter(s) (50 mL/Hr) IV Continuous <Continuous>  lidocaine   4% Patch 1 Patch Transdermal daily  multivitamin 1 Tablet(s) Oral daily  rivaroxaban 20 milliGRAM(s) Oral with dinner      LABS:	 	                            11.4   7.65  )-----------( 205      ( 22 Jan 2024 06:12 )             36.1     01-22    138  |  96  |  49.3<H>  ----------------------------<  161<H>  4.1   |  29.0  |  1.58<H>    Ca    9.6      22 Jan 2024 06:12  Mg     2.1     01-22    TPro  6.0<L>  /  Alb  3.1<L>  /  TBili  0.6  /  DBili  x   /  AST  19  /  ALT  20  /  AlkPhos  103  01-22    PT/INR/PTT ( 30 Dec 2023 06:11 )                       :                       :      X            :       26.5                  .        .                   .              .           .       X           .                                       Lipid Profile:   HgA1c: 8.3%   TSH:     TELEMETRY: Afib 90-110s  ECG: Afib     DIAGNOSTIC TESTING:  [ x] Echocardiogram: < from: TTE Limited W or WO Ultrasound Enhancing Agent (01.02.24 @ 10:48) >   1. Limited study to evaluate for effusion.   2. Technically difficult image quality.   3. No significant pericardial effusion seen in limited views.    < end of copied text >    [x ]  Catheterization: < from: Cardiac Catheterization (12.29.23 @ 16:05) >    Coronary Angiography   The coronary circulation is right dominant.      LM   Left main artery: The segment is visually normal in size and  structure. Angiography shows no disease. LUZ MARIA Flow 3.    LAD   Left anterior descending artery: Angiography shows minor  irregularities. LUZ MARIA Flow 3. First diagonal: Angiography shows minor  irregularities. LUZ MARIA Flow 3.      CX   Circumflex: Angiography shows minor irregularities. LUZ MARIA Flow 3. First  obtuse marginal: Angiography shows minor irregularities.  LUZ MARIA Flow 3.      RCA   Right coronary artery: The segment is large, dominant. Angiography  shows minor irregularities. LUZ MARIA Flow 3.    Left Heart Cath   Left ventricular function was assessed and demonstrates normal LV wall  motion. All remaining scored wall segments are  normal. Global left ventricular function is mildly depressed. Ejection  fraction was calculated by LV Gram with a value of 50%.  The left ventricle is normal in size. LV to AO pullback was performed  and there is no pressure gradient. The left ventricular end  diastolic pressure was 18 mmHg. LHC performed: Aortic valve crossed  and left ventricular pressures were obtained.  Valves     < end of copied text >    [ ] Stress Test:    OTHER:

## 2024-01-22 NOTE — CONSULT NOTE ADULT - ASSESSMENT
68F  chronic hx of spinal stenosis  has been on oxycontin   now c/o back pain  - Recommend starting robaxin 750mg TID standing. HOLD for sedation

## 2024-01-22 NOTE — PHYSICAL THERAPY INITIAL EVALUATION ADULT - DISCHARGE PLANNER MADE AWARE
I did send another refill for patient. However, it is imperative the patient schedules a follow-up appointment with neurosurgery for possible interventions. I do not want to continue giving him controlled substances for his symptoms. Patient notified of results recommendations and verbalized understanding. Patient requesting refills of Norco and Flexeril to go to [facility].     Still awaiting sick leave paperwork from his employer Active Problem List:     Vitamin D deficiency     Scalp psoriasis     Sleep apnea     Asthma     Indigestion     Morbid obesity ([address]     Low iron     S/P laparoscopic sleeve gastrectomy     Helicobacter pylori infection yes

## 2024-01-22 NOTE — PROGRESS NOTE ADULT - PROBLEM SELECTOR PLAN 2
- Admit to tele, elevated trops 185, doubt ACS given recent LHC   - pt does not appear overloaded on exam despite rise in pBNP from 712--> 3373  - c/w Bumex 2mg IVP q 12hrs, possible transition to PO bumex tomorrow.   - BIPAP PRN   - monitor volume status w/ dexamethasone   - PRN diuresis   -TTE 1/4 unchanged EF 60%

## 2024-01-22 NOTE — CONSULT NOTE ADULT - SUBJECTIVE AND OBJECTIVE BOX
Patient is a 68y old  Female who presents with a chief complaint of covid pna and heart failure (21 Jan 2024 13:44)      HPI:  Patient seen and examined before midnight.     69 y/o female with PMH of HTN, HLD, DM2, HFpEF, CKD3, DVT, Uterine CA, afib on Xarelto, COPD on home O2 4L, chronic lymphedema sent from [facility] to the ED for  chest pain. Patient reported pressure pain located on the left side, non-radiating, associated with shortness of breath. Patient has no nausea, vomiting, diaphoresis, fever, chills, palpitation, abdominal pain, change in bowel/urinary habit, HA. Of note, she was recently admitted and treated for CHF exacerbation, RSV and found to have new onset afib.    (20 Jan 2024 22:18)            Analgesic Dosing for past 24 hours reviewed as below:    ALPRAZolam   0.25 milliGRAM(s) Oral (01-22-24 @ 13:14)    oxyCODONE    IR   10 milliGRAM(s) Oral (01-22-24 @ 12:04)   10 milliGRAM(s) Oral (01-21-24 @ 22:47)    oxyCODONE  ER Tablet   20 milliGRAM(s) Oral (01-22-24 @ 06:02)   20 milliGRAM(s) Oral (01-21-24 @ 18:22)          T(C): 36.7 (01-22-24 @ 09:22), Max: 36.7 (01-21-24 @ 20:33)  HR: 127 (01-22-24 @ 11:45) (83 - 127)  BP: 118/68 (01-22-24 @ 11:45) (93/59 - 118/68)  RR: 20 (01-22-24 @ 04:57) (18 - 20)  SpO2: 96% (01-22-24 @ 09:22) (96% - 97%)      01-21-24 @ 07:01  -  01-22-24 @ 07:00  --------------------------------------------------------  IN: 0 mL / OUT: 300 mL / NET: -300 mL        acetaminophen     Tablet .. 650 milliGRAM(s) Oral every 6 hours PRN  albuterol    90 MICROgram(s) HFA Inhaler 2 Puff(s) Inhalation every 6 hours PRN  ALPRAZolam 0.25 milliGRAM(s) Oral every 8 hours PRN  aluminum hydroxide/magnesium hydroxide/simethicone Suspension 30 milliLiter(s) Oral every 4 hours PRN  atorvastatin 80 milliGRAM(s) Oral at bedtime  budesonide  80 MICROgram(s)/formoterol 4.5 MICROgram(s) Inhaler 2 Puff(s) Inhalation two times a day  buMETAnide Injectable 2 milliGRAM(s) IV Push every 12 hours  chlorhexidine 2% Cloths 1 Application(s) Topical <User Schedule>  clotrimazole/betamethasone Cream 1 Application(s) Topical two times a day  cyclobenzaprine 5 milliGRAM(s) Oral every 8 hours PRN  dexAMETHasone     Tablet 6 milliGRAM(s) Oral daily  dextrose 5%. 1000 milliLiter(s) IV Continuous <Continuous>  dextrose 5%. 1000 milliLiter(s) IV Continuous <Continuous>  dextrose 50% Injectable 12.5 Gram(s) IV Push once  dextrose 50% Injectable 25 Gram(s) IV Push once  dextrose 50% Injectable 25 Gram(s) IV Push once  dextrose Oral Gel 15 Gram(s) Oral once PRN  famotidine    Tablet 20 milliGRAM(s) Oral daily  glucagon  Injectable 1 milliGRAM(s) IntraMuscular once  insulin glargine Injectable (LANTUS) 35 Unit(s) SubCutaneous every morning  insulin glargine Injectable (LANTUS) 35 Unit(s) SubCutaneous at bedtime  insulin lispro (ADMELOG) corrective regimen sliding scale   SubCutaneous three times a day before meals  lactulose Syrup 20 Gram(s) Oral daily  lidocaine   4% Patch 1 Patch Transdermal daily  melatonin 3 milliGRAM(s) Oral at bedtime PRN  metoprolol tartrate 25 milliGRAM(s) Oral every 8 hours  multivitamin 1 Tablet(s) Oral daily  ondansetron Injectable 4 milliGRAM(s) IV Push every 8 hours PRN  oxyCODONE    IR 5 milliGRAM(s) Oral every 6 hours PRN  oxyCODONE    IR 10 milliGRAM(s) Oral every 6 hours PRN  oxyCODONE  ER Tablet 20 milliGRAM(s) Oral every 12 hours  remdesivir  IVPB   IV Intermittent   remdesivir  IVPB 100 milliGRAM(s) IV Intermittent every 24 hours  rivaroxaban 20 milliGRAM(s) Oral with dinner  senna 2 Tablet(s) Oral at bedtime                          11.4   7.65  )-----------( 205      ( 22 Jan 2024 06:12 )             36.1     01-22    138  |  96  |  49.3<H>  ----------------------------<  161<H>  4.1   |  29.0  |  1.58<H>    Ca    9.6      22 Jan 2024 06:12  Mg     2.1     01-22    TPro  6.0<L>  /  Alb  3.1<L>  /  TBili  0.6  /  DBili  x   /  AST  19  /  ALT  20  /  AlkPhos  103  01-22      Urinalysis Basic - ( 22 Jan 2024 06:12 )    Color: x / Appearance: x / SG: x / pH: x  Gluc: 161 mg/dL / Ketone: x  / Bili: x / Urobili: x   Blood: x / Protein: x / Nitrite: x   Leuk Esterase: x / RBC: x / WBC x   Sq Epi: x / Non Sq Epi: x / Bacteria: x        Pain Service   [phone]

## 2024-01-22 NOTE — PROGRESS NOTE ADULT - PROBLEM SELECTOR PLAN 1
- rate control with BB, c/w metoprolol tartrate 25mg Q 8 hours. Increase as tolerated.   - rates are better controlled with HR between 90-110s.   - rates driven by infection   - treat underlying infection   - pt would likely benefit from rhythm control, will discuss with EP team for recs   - EP eval for potential MOJGAN/DCCV before discharge and once cleared from infectious standpoint.   - [id number]   - c/w xarelto for AC

## 2024-01-23 LAB
ALBUMIN SERPL ELPH-MCNC: 3.6 G/DL — SIGNIFICANT CHANGE UP (ref 3.3–5.2)
ALP SERPL-CCNC: 121 U/L — HIGH (ref 40–120)
ALT FLD-CCNC: 22 U/L — SIGNIFICANT CHANGE UP
ANION GAP SERPL CALC-SCNC: 14 MMOL/L — SIGNIFICANT CHANGE UP (ref 5–17)
ANION GAP SERPL CALC-SCNC: 17 MMOL/L — SIGNIFICANT CHANGE UP (ref 5–17)
AST SERPL-CCNC: 16 U/L — SIGNIFICANT CHANGE UP
BASOPHILS # BLD AUTO: 0.08 K/UL — SIGNIFICANT CHANGE UP (ref 0–0.2)
BASOPHILS NFR BLD AUTO: 0.8 % — SIGNIFICANT CHANGE UP (ref 0–2)
BILIRUB SERPL-MCNC: 0.7 MG/DL — SIGNIFICANT CHANGE UP (ref 0.4–2)
BUN SERPL-MCNC: 51.7 MG/DL — HIGH (ref 8–20)
BUN SERPL-MCNC: 54.2 MG/DL — HIGH (ref 8–20)
CALCIUM SERPL-MCNC: 10 MG/DL — SIGNIFICANT CHANGE UP (ref 8.4–10.5)
CALCIUM SERPL-MCNC: 9.6 MG/DL — SIGNIFICANT CHANGE UP (ref 8.4–10.5)
CHLORIDE SERPL-SCNC: 89 MMOL/L — LOW (ref 96–108)
CHLORIDE SERPL-SCNC: 89 MMOL/L — LOW (ref 96–108)
CO2 SERPL-SCNC: 28 MMOL/L — SIGNIFICANT CHANGE UP (ref 22–29)
CO2 SERPL-SCNC: 29 MMOL/L — SIGNIFICANT CHANGE UP (ref 22–29)
CREAT SERPL-MCNC: 1.58 MG/DL — HIGH (ref 0.5–1.3)
CREAT SERPL-MCNC: 1.67 MG/DL — HIGH (ref 0.5–1.3)
EGFR: 33 ML/MIN/1.73M2 — LOW
EGFR: 35 ML/MIN/1.73M2 — LOW
EOSINOPHIL # BLD AUTO: 0 K/UL — SIGNIFICANT CHANGE UP (ref 0–0.5)
EOSINOPHIL NFR BLD AUTO: 0 % — SIGNIFICANT CHANGE UP (ref 0–6)
GLUCOSE BLDC GLUCOMTR-MCNC: 313 MG/DL — HIGH (ref 70–99)
GLUCOSE BLDC GLUCOMTR-MCNC: 358 MG/DL — HIGH (ref 70–99)
GLUCOSE BLDC GLUCOMTR-MCNC: 374 MG/DL — HIGH (ref 70–99)
GLUCOSE BLDC GLUCOMTR-MCNC: 420 MG/DL — HIGH (ref 70–99)
GLUCOSE BLDC GLUCOMTR-MCNC: 429 MG/DL — HIGH (ref 70–99)
GLUCOSE BLDC GLUCOMTR-MCNC: 432 MG/DL — HIGH (ref 70–99)
GLUCOSE BLDC GLUCOMTR-MCNC: 443 MG/DL — HIGH (ref 70–99)
GLUCOSE BLDC GLUCOMTR-MCNC: 462 MG/DL — CRITICAL HIGH (ref 70–99)
GLUCOSE BLDC GLUCOMTR-MCNC: 481 MG/DL — CRITICAL HIGH (ref 70–99)
GLUCOSE BLDC GLUCOMTR-MCNC: 487 MG/DL — CRITICAL HIGH (ref 70–99)
GLUCOSE BLDC GLUCOMTR-MCNC: 487 MG/DL — CRITICAL HIGH (ref 70–99)
GLUCOSE BLDC GLUCOMTR-MCNC: 525 MG/DL — CRITICAL HIGH (ref 70–99)
GLUCOSE BLDC GLUCOMTR-MCNC: 546 MG/DL — CRITICAL HIGH (ref 70–99)
GLUCOSE SERPL-MCNC: 347 MG/DL — HIGH (ref 70–99)
GLUCOSE SERPL-MCNC: 496 MG/DL — CRITICAL HIGH (ref 70–99)
GLUCOSE SERPL-MCNC: 525 MG/DL — CRITICAL HIGH (ref 70–99)
HCT VFR BLD CALC: 43 % — SIGNIFICANT CHANGE UP (ref 34.5–45)
HGB BLD-MCNC: 13.5 G/DL — SIGNIFICANT CHANGE UP (ref 11.5–15.5)
IMM GRANULOCYTES NFR BLD AUTO: 3.9 % — HIGH (ref 0–0.9)
LYMPHOCYTES # BLD AUTO: 1.1 K/UL — SIGNIFICANT CHANGE UP (ref 1–3.3)
LYMPHOCYTES # BLD AUTO: 11.1 % — LOW (ref 13–44)
MAGNESIUM SERPL-MCNC: 2.1 MG/DL — SIGNIFICANT CHANGE UP (ref 1.6–2.6)
MCHC RBC-ENTMCNC: 28.8 PG — SIGNIFICANT CHANGE UP (ref 27–34)
MCHC RBC-ENTMCNC: 31.4 GM/DL — LOW (ref 32–36)
MCV RBC AUTO: 91.7 FL — SIGNIFICANT CHANGE UP (ref 80–100)
MONOCYTES # BLD AUTO: 0.69 K/UL — SIGNIFICANT CHANGE UP (ref 0–0.9)
MONOCYTES NFR BLD AUTO: 7 % — SIGNIFICANT CHANGE UP (ref 2–14)
NEUTROPHILS # BLD AUTO: 7.62 K/UL — HIGH (ref 1.8–7.4)
NEUTROPHILS NFR BLD AUTO: 77.2 % — HIGH (ref 43–77)
PLATELET # BLD AUTO: 234 K/UL — SIGNIFICANT CHANGE UP (ref 150–400)
POTASSIUM SERPL-MCNC: 4.3 MMOL/L — SIGNIFICANT CHANGE UP (ref 3.5–5.3)
POTASSIUM SERPL-MCNC: 4.6 MMOL/L — SIGNIFICANT CHANGE UP (ref 3.5–5.3)
POTASSIUM SERPL-SCNC: 4.3 MMOL/L — SIGNIFICANT CHANGE UP (ref 3.5–5.3)
POTASSIUM SERPL-SCNC: 4.6 MMOL/L — SIGNIFICANT CHANGE UP (ref 3.5–5.3)
PROT SERPL-MCNC: 6.8 G/DL — SIGNIFICANT CHANGE UP (ref 6.6–8.7)
RBC # BLD: 4.69 M/UL — SIGNIFICANT CHANGE UP (ref 3.8–5.2)
RBC # FLD: 14.5 % — SIGNIFICANT CHANGE UP (ref 10.3–14.5)
SODIUM SERPL-SCNC: 131 MMOL/L — LOW (ref 135–145)
SODIUM SERPL-SCNC: 135 MMOL/L — SIGNIFICANT CHANGE UP (ref 135–145)
WBC # BLD: 9.88 K/UL — SIGNIFICANT CHANGE UP (ref 3.8–10.5)
WBC # FLD AUTO: 9.88 K/UL — SIGNIFICANT CHANGE UP (ref 3.8–10.5)

## 2024-01-23 PROCEDURE — 99232 SBSQ HOSP IP/OBS MODERATE 35: CPT

## 2024-01-23 PROCEDURE — 70450 CT HEAD/BRAIN W/O DYE: CPT | Mod: 26

## 2024-01-23 PROCEDURE — 99232 SBSQ HOSP IP/OBS MODERATE 35: CPT | Mod: 25

## 2024-01-23 RX ORDER — INSULIN LISPRO 100/ML
10 VIAL (ML) SUBCUTANEOUS ONCE
Refills: 0 | Status: COMPLETED | OUTPATIENT
Start: 2024-01-23 | End: 2024-01-23

## 2024-01-23 RX ORDER — METOPROLOL TARTRATE 50 MG
25 TABLET ORAL ONCE
Refills: 0 | Status: COMPLETED | OUTPATIENT
Start: 2024-01-23 | End: 2024-01-23

## 2024-01-23 RX ORDER — INSULIN LISPRO 100/ML
10 VIAL (ML) SUBCUTANEOUS
Refills: 0 | Status: DISCONTINUED | OUTPATIENT
Start: 2024-01-23 | End: 2024-01-24

## 2024-01-23 RX ORDER — INSULIN HUMAN 100 [IU]/ML
5 INJECTION, SOLUTION SUBCUTANEOUS ONCE
Refills: 0 | Status: COMPLETED | OUTPATIENT
Start: 2024-01-23 | End: 2024-01-23

## 2024-01-23 RX ORDER — METOPROLOL TARTRATE 50 MG
100 TABLET ORAL DAILY
Refills: 0 | Status: DISCONTINUED | OUTPATIENT
Start: 2024-01-24 | End: 2024-02-01

## 2024-01-23 RX ORDER — BUMETANIDE 0.25 MG/ML
2 INJECTION INTRAMUSCULAR; INTRAVENOUS DAILY
Refills: 0 | Status: DISCONTINUED | OUTPATIENT
Start: 2024-01-24 | End: 2024-02-01

## 2024-01-23 RX ADMIN — OXYCODONE HYDROCHLORIDE 20 MILLIGRAM(S): 5 TABLET ORAL at 17:46

## 2024-01-23 RX ADMIN — BUMETANIDE 2 MILLIGRAM(S): 0.25 INJECTION INTRAMUSCULAR; INTRAVENOUS at 08:27

## 2024-01-23 RX ADMIN — REMDESIVIR 200 MILLIGRAM(S): 5 INJECTION INTRAVENOUS at 11:31

## 2024-01-23 RX ADMIN — OXYCODONE HYDROCHLORIDE 10 MILLIGRAM(S): 5 TABLET ORAL at 15:10

## 2024-01-23 RX ADMIN — BUDESONIDE AND FORMOTEROL FUMARATE DIHYDRATE 2 PUFF(S): 160; 4.5 AEROSOL RESPIRATORY (INHALATION) at 08:27

## 2024-01-23 RX ADMIN — OXYCODONE HYDROCHLORIDE 20 MILLIGRAM(S): 5 TABLET ORAL at 17:36

## 2024-01-23 RX ADMIN — Medication 25 MILLIGRAM(S): at 11:30

## 2024-01-23 RX ADMIN — Medication 0.25 MILLIGRAM(S): at 07:42

## 2024-01-23 RX ADMIN — OXYCODONE HYDROCHLORIDE 10 MILLIGRAM(S): 5 TABLET ORAL at 14:03

## 2024-01-23 RX ADMIN — CLOTRIMAZOLE AND BETAMETHASONE DIPROPIONATE 1 APPLICATION(S): 10; .5 CREAM TOPICAL at 17:37

## 2024-01-23 RX ADMIN — FAMOTIDINE 20 MILLIGRAM(S): 10 INJECTION INTRAVENOUS at 11:30

## 2024-01-23 RX ADMIN — Medication 13: at 17:36

## 2024-01-23 RX ADMIN — OXYCODONE HYDROCHLORIDE 20 MILLIGRAM(S): 5 TABLET ORAL at 06:50

## 2024-01-23 RX ADMIN — Medication 0.25 MILLIGRAM(S): at 21:00

## 2024-01-23 RX ADMIN — Medication 1 TABLET(S): at 11:30

## 2024-01-23 RX ADMIN — Medication 10 UNIT(S): at 21:45

## 2024-01-23 RX ADMIN — Medication 25 MILLIGRAM(S): at 17:37

## 2024-01-23 RX ADMIN — RIVAROXABAN 20 MILLIGRAM(S): KIT at 17:37

## 2024-01-23 RX ADMIN — Medication 13: at 11:29

## 2024-01-23 RX ADMIN — Medication 6 MILLIGRAM(S): at 06:13

## 2024-01-23 RX ADMIN — Medication 25 MILLIGRAM(S): at 06:13

## 2024-01-23 RX ADMIN — INSULIN GLARGINE 35 UNIT(S): 100 INJECTION, SOLUTION SUBCUTANEOUS at 21:45

## 2024-01-23 RX ADMIN — OXYCODONE HYDROCHLORIDE 10 MILLIGRAM(S): 5 TABLET ORAL at 03:44

## 2024-01-23 RX ADMIN — ATORVASTATIN CALCIUM 80 MILLIGRAM(S): 80 TABLET, FILM COATED ORAL at 21:34

## 2024-01-23 RX ADMIN — CLOTRIMAZOLE AND BETAMETHASONE DIPROPIONATE 1 APPLICATION(S): 10; .5 CREAM TOPICAL at 06:13

## 2024-01-23 RX ADMIN — SENNA PLUS 2 TABLET(S): 8.6 TABLET ORAL at 21:34

## 2024-01-23 RX ADMIN — OXYCODONE HYDROCHLORIDE 10 MILLIGRAM(S): 5 TABLET ORAL at 21:00

## 2024-01-23 RX ADMIN — Medication 10 UNIT(S): at 02:07

## 2024-01-23 RX ADMIN — OXYCODONE HYDROCHLORIDE 10 MILLIGRAM(S): 5 TABLET ORAL at 04:23

## 2024-01-23 RX ADMIN — OXYCODONE HYDROCHLORIDE 20 MILLIGRAM(S): 5 TABLET ORAL at 06:13

## 2024-01-23 RX ADMIN — LACTULOSE 20 GRAM(S): 10 SOLUTION ORAL at 11:30

## 2024-01-23 RX ADMIN — CYCLOBENZAPRINE HYDROCHLORIDE 5 MILLIGRAM(S): 10 TABLET, FILM COATED ORAL at 16:01

## 2024-01-23 RX ADMIN — Medication 10 UNIT(S): at 11:30

## 2024-01-23 RX ADMIN — Medication 10 UNIT(S): at 17:36

## 2024-01-23 RX ADMIN — Medication 9: at 08:26

## 2024-01-23 RX ADMIN — Medication 10 UNIT(S): at 23:24

## 2024-01-23 RX ADMIN — Medication 13 UNIT(S): at 00:07

## 2024-01-23 RX ADMIN — INSULIN GLARGINE 35 UNIT(S): 100 INJECTION, SOLUTION SUBCUTANEOUS at 08:24

## 2024-01-23 RX ADMIN — INSULIN HUMAN 5 UNIT(S): 100 INJECTION, SOLUTION SUBCUTANEOUS at 04:44

## 2024-01-23 RX ADMIN — OXYCODONE HYDROCHLORIDE 10 MILLIGRAM(S): 5 TABLET ORAL at 22:00

## 2024-01-23 RX ADMIN — CHLORHEXIDINE GLUCONATE 1 APPLICATION(S): 213 SOLUTION TOPICAL at 06:14

## 2024-01-23 NOTE — DIETITIAN INITIAL EVALUATION ADULT - ORAL INTAKE PTA/DIET HISTORY
Pt with good po intake and is reporting 140# intentional weight loss over the last 4 mo. Pt has been trying to decrease portions and choose healthier options. Provided pt with meal planning with plate model and Dash diet education materials. Stage 2 sacrum noted.

## 2024-01-23 NOTE — PROVIDER CONTACT NOTE (OTHER) - ASSESSMENT
Patient asymptomatic.
Pt blood glucose in 480s' and 500's
Pt has blood glucose of 419 and recheck of 417.

## 2024-01-23 NOTE — PROGRESS NOTE ADULT - ASSESSMENT
69 y/o female with PMH of HTN, HLD, DM2, HFpEF, CKD3, DVT, Uterine CA, afib on Xarelto, COPD on home O2 4L, chronic lymphedema sent from [facility] to the ED for  chest pain. Patient reported pressure pain located on the left side, non-radiating, associated with shortness of breath. In the ED, VBG done, respiratory acidosis, BiPAP offered but patient reportedly declined. Lasix given for possible CHF ex; cardiology consulted.     Shortness of breath  Multifactorial etiology: COPD, CHFpEF and morbid obesity   bumex iv bid  Patient on oxygen 4L (home dose)     remdesivir and decadron     afib- needs ep eval as per cardio   xarelto   metoprolol    CKD-3   Patient on diuretic chronically   Monitor renal function     HTN/HLD   Atorvastatin 80mg   Lopressor 25mg tid with holding parameters     DM-2   Lantus bid and ssi     Hx of spinal stenosis  Lidocaine patch daily   cosnult pain manaegment in am     floaters - ct head  patient states she is allergic to robaxin will place on allergy list  - order flexeril   '  likley dc in 2-3 days to [facility]

## 2024-01-23 NOTE — PROGRESS NOTE ADULT - PROBLEM SELECTOR PLAN 1
- rate control with BB, c/w metoprolol tartrate 25mg Q 8 hours.   - rates are better controlled with HR between 70-80s   - rates driven by infection   - treat underlying infection   - [id number] 5   - c/w xarelto for AC. - rate control with BB, will change to metoprolol  mg PO daily.   - rates are better controlled with HR between 70-80s   - rates driven by infection   - treat underlying infection   - [id number] 5   - c/w Xarelto for AC.    No further inpatient cardiac work up at this time.  Will sign off.  Please reconsult if needed.

## 2024-01-23 NOTE — PROGRESS NOTE ADULT - SUBJECTIVE AND OBJECTIVE BOX
[facility] PHYSICIAN [facility]NERS                                                         CARDIOLOGY AT [facility]                                                                  [address] [facility], [place] [place] [address]- [facility]                                                         Telephone: [phone]. Fax:[phone]                                                                             PROGRESS NOTE    Reason for follow up: Afib with RVR   Update: HR is better controlled in the 70-80s. Pt reports feeling better. On 4 L nasal cannula.        Review of symptoms:   Cardiac:  No chest pain. No dyspnea. No palpitations.  Respiratory: no cough. No dyspnea  Gastrointestinal: No diarrhea. No abdominal pain. No bleeding.   Neuro: No focal neuro complaints.    Vitals:  T(C): 36.7 (01-23-24 @ 06:23), Max: 37.2 (01-22-24 @ 19:39)  HR: 89 (01-23-24 @ 08:15) (89 - 127)  BP: 115/85 (01-23-24 @ 08:15) (97/61 - 121/76)  RR: 18 (01-23-24 @ 06:23) (18 - 20)  SpO2: 97% (01-23-24 @ 06:23) (96% - 99%)  Wt(kg): --  I&O's Summary    Weight (kg): 161.5 (01-20 @ 14:34)    PHYSICAL EXAM:  Appearance: Comfortable. No acute distress. Morbidly obese.   HEENT:  Atraumatic. Normocephalic.  Normal oral mucosa  Neurologic: A & O x 3, no gross focal deficits.  Cardiovascular: Irregularly irregular, No murmur, no rubs/gallops. No JVD  Respiratory: Lungs sounds are coarse. unlabored   Gastrointestinal:  Soft, Non-tender, + BS  Lower Extremities: 2+ Peripheral Pulses, No clubbing, cyanosis. B/L LE +2 edema  Psychiatry: Patient is calm. No agitation.   Skin: warm and dry.    CURRENT CARDIAC MEDICATIONS:  buMETAnide Injectable 2 milliGRAM(s) IV Push every 12 hours  metoprolol tartrate 25 milliGRAM(s) Oral every 6 hours  metoprolol tartrate Injectable 5 milliGRAM(s) IV Push every 6 hours PRN      CURRENT OTHER MEDICATIONS:  albuterol    90 MICROgram(s) HFA Inhaler 2 Puff(s) Inhalation every 6 hours PRN Bronchospasm  budesonide  80 MICROgram(s)/formoterol 4.5 MICROgram(s) Inhaler 2 Puff(s) Inhalation two times a day  remdesivir  IVPB 100 milliGRAM(s) IV Intermittent every 24 hours  remdesivir  IVPB   IV Intermittent   acetaminophen     Tablet .. 650 milliGRAM(s) Oral every 6 hours PRN Temp greater or equal to 38C (100.4F), Mild Pain (1 - 3)  ALPRAZolam 0.25 milliGRAM(s) Oral every 8 hours PRN for anxiety/Sleep  cyclobenzaprine 5 milliGRAM(s) Oral every 8 hours PRN Muscle Spasm  melatonin 3 milliGRAM(s) Oral at bedtime PRN Insomnia  ondansetron Injectable 4 milliGRAM(s) IV Push every 8 hours PRN Nausea and/or Vomiting  oxyCODONE    IR 5 milliGRAM(s) Oral every 6 hours PRN Moderate Pain (4 - 6)  oxyCODONE    IR 10 milliGRAM(s) Oral every 6 hours PRN Severe Pain (7 - 10)  oxyCODONE  ER Tablet 20 milliGRAM(s) Oral every 12 hours  aluminum hydroxide/magnesium hydroxide/simethicone Suspension 30 milliLiter(s) Oral every 4 hours PRN Dyspepsia  famotidine    Tablet 20 milliGRAM(s) Oral daily  lactulose Syrup 20 Gram(s) Oral daily  senna 2 Tablet(s) Oral at bedtime  atorvastatin 80 milliGRAM(s) Oral at bedtime  dexAMETHasone     Tablet 6 milliGRAM(s) Oral daily, Stop order after: 10 Days  dextrose 50% Injectable 12.5 Gram(s) IV Push once, Stop order after: 1 Doses  dextrose 50% Injectable 25 Gram(s) IV Push once, Stop order after: 1 Doses  dextrose 50% Injectable 25 Gram(s) IV Push once, Stop order after: 1 Doses  dextrose Oral Gel 15 Gram(s) Oral once, Stop order after: 1 Doses PRN Blood Glucose LESS THAN 70 milliGRAM(s)/deciliter  glucagon  Injectable 1 milliGRAM(s) IntraMuscular once, Stop order after: 1 Doses  insulin glargine Injectable (LANTUS) 35 Unit(s) SubCutaneous every morning  insulin glargine Injectable (LANTUS) 35 Unit(s) SubCutaneous at bedtime  insulin lispro (ADMELOG) corrective regimen sliding scale   SubCutaneous three times a day before meals  insulin lispro Injectable (ADMELOG) 10 Unit(s) SubCutaneous three times a day before meals  chlorhexidine 2% Cloths 1 Application(s) Topical <User Schedule>  clotrimazole/betamethasone Cream 1 Application(s) Topical two times a day  dextrose 5%. 1000 milliLiter(s) (100 mL/Hr) IV Continuous <Continuous>  dextrose 5%. 1000 milliLiter(s) (50 mL/Hr) IV Continuous <Continuous>  lidocaine   4% Patch 1 Patch Transdermal daily  multivitamin 1 Tablet(s) Oral daily  rivaroxaban 20 milliGRAM(s) Oral with dinner      LABS:	 	                            13.5   9.88  )-----------( 234      ( 23 Jan 2024 05:40 )             43.0     01-23    135  |  89<L>  |  51.7<H>  ----------------------------<  347<H>  4.3   |  29.0  |  1.67<H>    Ca    10.0      23 Jan 2024 05:40  Mg     2.1     01-23    TPro  6.8  /  Alb  3.6  /  TBili  0.7  /  DBili  x   /  AST  16  /  ALT  22  /  AlkPhos  121<H>  01-23    PT/INR/PTT ( 30 Dec 2023 06:11 )                       :                       :      X            :       26.5                  .        .                   .              .           .       X           .                                       Lipid Profile:   HgA1c: 8.3%   TSH:     TELEMETRY: Afib 70-80s   ECG: Afib    DIAGNOSTIC TESTING:  [x ] Echocardiogram: < from: TTE Limited W or WO Ultrasound Enhancing Agent (01.02.24 @ 10:48) >      1. Limited study to evaluate for effusion.   2. Technically difficult image quality.   3. No significant pericardial effusion seen in limited views.    < end of copied text >    [x]   Catheterization: < from: Cardiac Catheterization (12.29.23 @ 16:05) >  Mild Coronary Irregularities   Mildy Reduced LV Function: LVEF=50% and LVEDP=18 mmHg   Moderate to Severe Elevation of Right Heart Pressures:   RA=13 mmHg; RV=67/13 mmHg; PCWP=20 mmHg; PVR=3.8 JUNIOR   Severe Combined Pre- and Post-capillary Pul HTN=67/25-40 mmHg    Borderline CO=5.3L/min and CI=2.0 L/min/m2   Normal Hailey=3.2   Borderline =1.0 W   Recommendations:   Aggressive medical therapy, Low calorie diet, Low fat diet, LowNA,  Resume all previous activities in 2 days, Exercise program,  Cardiac rehabilitation program, Close monitoring of BUN and creatinine    Consider DCCV to restore NSR   GDMT   Follow with cardiologyand HF     < end of copied text >    [ ] Stress Test:    OTHER:

## 2024-01-23 NOTE — DIETITIAN INITIAL EVALUATION ADULT - OTHER INFO
69 y/o female with PMH of HTN, HLD, DM2, HFpEF, CKD3, DVT, Uterine CA, afib on Xarelto, COPD on home O2 4L, chronic lymphedema sent from [facility] to the ED for  chest pain. Patient reported pressure pain located on the left side, non-radiating, associated with shortness of breath. In the ED, VBG done, respiratory acidosis, BiPAP offered but patient reportedly declined. Lasix given for possible CHF ex; cardiology consulted.

## 2024-01-23 NOTE — PROGRESS NOTE ADULT - PROBLEM SELECTOR PLAN 2
- Admit to tele, elevated trops 185, doubt ACS given recent LHC   - pt does not appear overloaded on exam despite rise in pBNP from 712--> 3373  - c/w Bumex 2mg IVP q 12hrs for diuresis, possible transition to PO bumex tomorrow.   - BIPAP PRN   - monitor volume status w/ dexamethasone   -TTE 1/4 unchanged EF 60%  - Monitor on telemetry.  Strict i/o and daily weights.  Keep K > 4, Mg > 2.  Monitor renal function with ongoing diuresis. - Admit to tele, elevated trops 185, doubt ACS given recent LHC   - pt does not appear overloaded on exam despite rise in pBNP from 712--> 3373  - was on Bumex 2 mg IV push BID, will transition to PO Bumex 2 mg daily.   - BIPAP PRN   - monitor volume status w/ dexamethasone   -TTE 1/4 unchanged EF 60%  - Monitor on telemetry.  Strict i/o and daily weights.  Keep K > 4, Mg > 2.  Monitor renal function with ongoing diuresis.

## 2024-01-23 NOTE — DIETITIAN INITIAL EVALUATION ADULT - PERTINENT LABORATORY DATA
01-23    135  |  89<L>  |  51.7<H>  ----------------------------<  347<H>  4.3   |  29.0  |  1.67<H>    Ca    10.0      23 Jan 2024 05:40  Mg     2.1     01-23    TPro  6.8  /  Alb  3.6  /  TBili  0.7  /  DBili  x   /  AST  16  /  ALT  22  /  AlkPhos  121<H>  01-23  POCT Blood Glucose.: 443 mg/dL (01-23-24 @ 11:28)  A1C with Estimated Average Glucose Result: 8.3 % (12-17-23 @ 08:20)  A1C with Estimated Average Glucose Result: 7.6 % (11-25-23 @ 09:00)  A1C with Estimated Average Glucose Result: 7.2 % (09-17-23 @ 16:13)

## 2024-01-23 NOTE — DIETITIAN INITIAL EVALUATION ADULT - PERTINENT MEDS FT
MEDICATIONS  (STANDING):  atorvastatin 80 milliGRAM(s) Oral at bedtime  budesonide  80 MICROgram(s)/formoterol 4.5 MICROgram(s) Inhaler 2 Puff(s) Inhalation two times a day  buMETAnide Injectable 2 milliGRAM(s) IV Push every 12 hours  chlorhexidine 2% Cloths 1 Application(s) Topical <User Schedule>  clotrimazole/betamethasone Cream 1 Application(s) Topical two times a day  dexAMETHasone     Tablet 6 milliGRAM(s) Oral daily  dextrose 5%. 1000 milliLiter(s) (50 mL/Hr) IV Continuous <Continuous>  dextrose 5%. 1000 milliLiter(s) (100 mL/Hr) IV Continuous <Continuous>  dextrose 50% Injectable 25 Gram(s) IV Push once  dextrose 50% Injectable 12.5 Gram(s) IV Push once  dextrose 50% Injectable 25 Gram(s) IV Push once  famotidine    Tablet 20 milliGRAM(s) Oral daily  glucagon  Injectable 1 milliGRAM(s) IntraMuscular once  insulin glargine Injectable (LANTUS) 35 Unit(s) SubCutaneous every morning  insulin glargine Injectable (LANTUS) 35 Unit(s) SubCutaneous at bedtime  insulin lispro (ADMELOG) corrective regimen sliding scale   SubCutaneous three times a day before meals  insulin lispro Injectable (ADMELOG) 10 Unit(s) SubCutaneous three times a day before meals  lactulose Syrup 20 Gram(s) Oral daily  lidocaine   4% Patch 1 Patch Transdermal daily  metoprolol tartrate 25 milliGRAM(s) Oral every 6 hours  multivitamin 1 Tablet(s) Oral daily  oxyCODONE  ER Tablet 20 milliGRAM(s) Oral every 12 hours  remdesivir  IVPB   IV Intermittent   remdesivir  IVPB 100 milliGRAM(s) IV Intermittent every 24 hours  rivaroxaban 20 milliGRAM(s) Oral with dinner  senna 2 Tablet(s) Oral at bedtime    MEDICATIONS  (PRN):  acetaminophen     Tablet .. 650 milliGRAM(s) Oral every 6 hours PRN Temp greater or equal to 38C (100.4F), Mild Pain (1 - 3)  albuterol    90 MICROgram(s) HFA Inhaler 2 Puff(s) Inhalation every 6 hours PRN Bronchospasm  ALPRAZolam 0.25 milliGRAM(s) Oral every 8 hours PRN for anxiety/Sleep  aluminum hydroxide/magnesium hydroxide/simethicone Suspension 30 milliLiter(s) Oral every 4 hours PRN Dyspepsia  cyclobenzaprine 5 milliGRAM(s) Oral every 8 hours PRN Muscle Spasm  dextrose Oral Gel 15 Gram(s) Oral once PRN Blood Glucose LESS THAN 70 milliGRAM(s)/deciliter  melatonin 3 milliGRAM(s) Oral at bedtime PRN Insomnia  metoprolol tartrate Injectable 5 milliGRAM(s) IV Push every 6 hours PRN for heart rate above 110 bpm  ondansetron Injectable 4 milliGRAM(s) IV Push every 8 hours PRN Nausea and/or Vomiting  oxyCODONE    IR 5 milliGRAM(s) Oral every 6 hours PRN Moderate Pain (4 - 6)  oxyCODONE    IR 10 milliGRAM(s) Oral every 6 hours PRN Severe Pain (7 - 10)

## 2024-01-23 NOTE — PROGRESS NOTE ADULT - ASSESSMENT
68F  chronic hx of spinal stenosis  has been on oxycontin   now c/o back pain    Pain controlled  Pain service will sign off  Please reconsult as needed

## 2024-01-23 NOTE — PROVIDER CONTACT NOTE (OTHER) - RECOMMENDATIONS
increase sliding scale and add pre meal insulin
pt due to get 13 units sliding scale and 10 units pre meal with dinner

## 2024-01-23 NOTE — PROVIDER CONTACT NOTE (OTHER) - ACTION/TREATMENT ORDERED:
13 units of Insulin x 1 dose.
md aware, will adjust insulin for 1/23. Continue to monitor.
Give sliding scale and pre meal insulin now.

## 2024-01-23 NOTE — PROGRESS NOTE ADULT - SUBJECTIVE AND OBJECTIVE BOX
AGUEDA LUIS    [id number]    68y      Female    INTERVAL HPI/OVERNIGHT EVENTS:  p[atient being seen for covid. patient seen at bedside and complains of floaters in her eyes.     Patients tele reviewed, is still in afib but rate controlled    REVIEW OF SYSTEMS:    CONSTITUTIONAL: No fever, weight loss, or fatigue  RESPIRATORY: No cough, wheezing, hemoptysis; No shortness of breath  CARDIOVASCULAR: No chest pain, palpitations  GASTROINTESTINAL: No abdominal or epigastric pain. No nausea, vomiting  NEUROLOGICAL: No headaches, memory loss, loss of strength.  MISCELLANEOUS:      Vital Signs Last 24 Hrs  T(C): 36.7 (23 Jan 2024 06:23), Max: 37.2 (22 Jan 2024 19:39)  T(F): 98 (23 Jan 2024 06:23), Max: 98.9 (22 Jan 2024 19:39)  HR: 89 (23 Jan 2024 08:15) (89 - 127)  BP: 115/85 (23 Jan 2024 08:15) (97/61 - 121/76)  BP(mean): --  RR: 18 (23 Jan 2024 06:23) (18 - 20)  SpO2: 97% (23 Jan 2024 06:23) (96% - 99%)    Parameters below as of 23 Jan 2024 06:23  Patient On (Oxygen Delivery Method): nasal cannula  O2 Flow (L/min): 4      PHYSICAL EXAM:    · Constitutional	well-groomed; obese  · Eyes	PERRL; EOMI; conjunctiva clear  · Respiratory	no wheezes; no respiratory distress; respirations non-labored  · Cardiovascular	S1 S2 present; Irregularly irregular rhythm; pedal edema  · Pedal Edema Severity	chronic lymphedema b/l  · Gastrointestinal	soft; nontender; nondistended; normal active bowel sounds  · Neurological	sensation intact; responds to verbal commands  · Skin	warm and dry  · Skin Comments	venous chronic changes on b/l LE; erythema, lymphedema  · Musculoskeletal	no joint swelling; no joint erythema; no joint warmth; no calf tenderness  · Psychiatric	normal affect; alert and oriented x3; normal behavior        LABS:                        13.5   9.88  )-----------( 234      ( 23 Jan 2024 05:40 )             43.0     01-23    135  |  89<L>  |  51.7<H>  ----------------------------<  347<H>  4.3   |  29.0  |  1.67<H>    Ca    10.0      23 Jan 2024 05:40  Mg     2.1     01-23    TPro  6.8  /  Alb  3.6  /  TBili  0.7  /  DBili  x   /  AST  16  /  ALT  22  /  AlkPhos  121<H>  01-23      Urinalysis Basic - ( 23 Jan 2024 05:40 )    Color: x / Appearance: x / SG: x / pH: x  Gluc: 347 mg/dL / Ketone: x  / Bili: x / Urobili: x   Blood: x / Protein: x / Nitrite: x   Leuk Esterase: x / RBC: x / WBC x   Sq Epi: x / Non Sq Epi: x / Bacteria: x          MEDICATIONS  (STANDING):  atorvastatin 80 milliGRAM(s) Oral at bedtime  budesonide  80 MICROgram(s)/formoterol 4.5 MICROgram(s) Inhaler 2 Puff(s) Inhalation two times a day  buMETAnide Injectable 2 milliGRAM(s) IV Push every 12 hours  chlorhexidine 2% Cloths 1 Application(s) Topical <User Schedule>  clotrimazole/betamethasone Cream 1 Application(s) Topical two times a day  dexAMETHasone     Tablet 6 milliGRAM(s) Oral daily  dextrose 5%. 1000 milliLiter(s) (100 mL/Hr) IV Continuous <Continuous>  dextrose 5%. 1000 milliLiter(s) (50 mL/Hr) IV Continuous <Continuous>  dextrose 50% Injectable 12.5 Gram(s) IV Push once  dextrose 50% Injectable 25 Gram(s) IV Push once  dextrose 50% Injectable 25 Gram(s) IV Push once  famotidine    Tablet 20 milliGRAM(s) Oral daily  glucagon  Injectable 1 milliGRAM(s) IntraMuscular once  insulin glargine Injectable (LANTUS) 35 Unit(s) SubCutaneous every morning  insulin glargine Injectable (LANTUS) 35 Unit(s) SubCutaneous at bedtime  insulin lispro (ADMELOG) corrective regimen sliding scale   SubCutaneous three times a day before meals  insulin lispro Injectable (ADMELOG) 10 Unit(s) SubCutaneous three times a day before meals  lactulose Syrup 20 Gram(s) Oral daily  lidocaine   4% Patch 1 Patch Transdermal daily  metoprolol tartrate 25 milliGRAM(s) Oral every 6 hours  multivitamin 1 Tablet(s) Oral daily  oxyCODONE  ER Tablet 20 milliGRAM(s) Oral every 12 hours  remdesivir  IVPB 100 milliGRAM(s) IV Intermittent every 24 hours  remdesivir  IVPB   IV Intermittent   rivaroxaban 20 milliGRAM(s) Oral with dinner  senna 2 Tablet(s) Oral at bedtime    MEDICATIONS  (PRN):  acetaminophen     Tablet .. 650 milliGRAM(s) Oral every 6 hours PRN Temp greater or equal to 38C (100.4F), Mild Pain (1 - 3)  albuterol    90 MICROgram(s) HFA Inhaler 2 Puff(s) Inhalation every 6 hours PRN Bronchospasm  ALPRAZolam 0.25 milliGRAM(s) Oral every 8 hours PRN for anxiety/Sleep  aluminum hydroxide/magnesium hydroxide/simethicone Suspension 30 milliLiter(s) Oral every 4 hours PRN Dyspepsia  cyclobenzaprine 5 milliGRAM(s) Oral every 8 hours PRN Muscle Spasm  dextrose Oral Gel 15 Gram(s) Oral once PRN Blood Glucose LESS THAN 70 milliGRAM(s)/deciliter  melatonin 3 milliGRAM(s) Oral at bedtime PRN Insomnia  metoprolol tartrate Injectable 5 milliGRAM(s) IV Push every 6 hours PRN for heart rate above 110 bpm  ondansetron Injectable 4 milliGRAM(s) IV Push every 8 hours PRN Nausea and/or Vomiting  oxyCODONE    IR 5 milliGRAM(s) Oral every 6 hours PRN Moderate Pain (4 - 6)  oxyCODONE    IR 10 milliGRAM(s) Oral every 6 hours PRN Severe Pain (7 - 10)      RADIOLOGY & ADDITIONAL TESTS:

## 2024-01-23 NOTE — PROGRESS NOTE ADULT - NS ATTEND AMEND GEN_ALL_CORE FT
Patient seen and examined by me.    T(C): 36.5 (01-23-24 @ 16:45), Max: 36.9 (01-23-24 @ 14:15)  HR: 79 (01-23-24 @ 16:45) (79 - 104)  BP: 100/69 (01-23-24 @ 16:45) (97/61 - 115/85)  RR: 18 (01-23-24 @ 16:45) (18 - 19)  SpO2: 97% (01-23-24 @ 16:45) (96% - 99%)  Patient alert and awake.  Chest- Bilateral Clear BS  Cardiac- S1 and S2  Abdomen- Soft    Assessment/Plan:  1. CHF  2. Afib  I have discussed my recommendation with the PA which are outlined above.  Will sign off
Patient seen and examined by me.  Patients son at bedside.  T(C): 36.7 (01-22-24 @ 09:22), Max: 36.7 (01-21-24 @ 20:33)  HR: 127 (01-22-24 @ 11:45) (83 - 127)  BP: 118/68 (01-22-24 @ 11:45) (93/59 - 118/68)  RR: 20 (01-22-24 @ 04:57) (18 - 20)  SpO2: 96% (01-22-24 @ 09:22) (96% - 97%)  Patient alert and awake.  Chest- Bilateral Clear BS  Cardiac- S1 and S2  Abdomen- Soft    Assessment/Plan:  1. HFpEF  2. Afib with RVR  I have discussed my recommendation with the PA which are outlined above.  Will follow.
Patient was seen and examined and appears to be volume overloaded bilateral lower extremity edema and + COVID   Afib with RVR (previous DCCV on     68y/oF PMH HTN, HLD, DM2, HFpEF (LVEF 60-65%), morbid obesity CKD3, DVT, Uterine CA, COPD on home O2 presenting with chest pain/SOB     1) Acute on chronic decompensated HFpEF (LVEF 60-65%) w/ severe pulmonary HTN  - patient appears volume overloaded with bilateral lower extremity edema   - pt does not appear overloaded on exam despite rise in pBNP from 712--> 3373  - CXR not able to visualize the lungs and due to obesity unable to get good lung window and CT chest non contrast shows small pleural effusion and   - on Bumex 2 mg IVP q12hrs and will monitor I/Os keep K ~ 4 and Mag ~ 2   - would likely benefit from PPNIV   - is COVID +   - no broderick for ischemic eval in 12/29/2023 and no evidence of CAD     2) Atrial Fibrillation with RVR CHADS-VASc 6   -rate uncontrolled with HR in the 130 bpm with Afib, metoprolol tartrate 25mg Q 8 hours   -pt would likely benefit from rhythm control, will discuss with EP team for recs and see if a candidate for ?MOJGAN/DCCV once stable    3) JACQUES likely cardiorenal   - on Bumex 2mg IVP q 12hrs and Cr improved from 2.20->1.74 (baseline on discharge was 1.5)   - monitor Cr in AM     4) COVID infection - management as per primary team      Sandra Kennedy D.O. [facility]  Cardiology/Vascular Cardiology -[facility]   Telephone # [phone].

## 2024-01-23 NOTE — PROGRESS NOTE ADULT - SUBJECTIVE AND OBJECTIVE BOX
Interval Hx:  Patient seen during rounds  Patient reports pain to be controlled on current medications  Patient denies sedation with medications     Analgesic Dosing for past 24 hours reviewed as below:    ALPRAZolam   0.25 milliGRAM(s) Oral (01-23-24 @ 07:42)   0.25 milliGRAM(s) Oral (01-22-24 @ 13:14)    cyclobenzaprine   5 milliGRAM(s) Oral (01-22-24 @ 23:15)    oxyCODONE    IR   10 milliGRAM(s) Oral (01-23-24 @ 03:44)    oxyCODONE  ER Tablet   20 milliGRAM(s) Oral (01-23-24 @ 06:13)   20 milliGRAM(s) Oral (01-22-24 @ 17:30)          T(C): 36.7 (01-23-24 @ 11:10), Max: 37.2 (01-22-24 @ 19:39)  HR: 89 (01-23-24 @ 11:10) (89 - 123)  BP: 99/69 (01-23-24 @ 11:10) (97/61 - 121/76)  RR: 18 (01-23-24 @ 11:10) (18 - 20)  SpO2: 97% (01-23-24 @ 11:10) (96% - 99%)        acetaminophen     Tablet .. 650 milliGRAM(s) Oral every 6 hours PRN  albuterol    90 MICROgram(s) HFA Inhaler 2 Puff(s) Inhalation every 6 hours PRN  ALPRAZolam 0.25 milliGRAM(s) Oral every 8 hours PRN  aluminum hydroxide/magnesium hydroxide/simethicone Suspension 30 milliLiter(s) Oral every 4 hours PRN  atorvastatin 80 milliGRAM(s) Oral at bedtime  budesonide  80 MICROgram(s)/formoterol 4.5 MICROgram(s) Inhaler 2 Puff(s) Inhalation two times a day  buMETAnide Injectable 2 milliGRAM(s) IV Push every 12 hours  chlorhexidine 2% Cloths 1 Application(s) Topical <User Schedule>  clotrimazole/betamethasone Cream 1 Application(s) Topical two times a day  cyclobenzaprine 5 milliGRAM(s) Oral every 8 hours PRN  dexAMETHasone     Tablet 6 milliGRAM(s) Oral daily  dextrose 5%. 1000 milliLiter(s) IV Continuous <Continuous>  dextrose 5%. 1000 milliLiter(s) IV Continuous <Continuous>  dextrose 50% Injectable 12.5 Gram(s) IV Push once  dextrose 50% Injectable 25 Gram(s) IV Push once  dextrose 50% Injectable 25 Gram(s) IV Push once  dextrose Oral Gel 15 Gram(s) Oral once PRN  famotidine    Tablet 20 milliGRAM(s) Oral daily  glucagon  Injectable 1 milliGRAM(s) IntraMuscular once  insulin glargine Injectable (LANTUS) 35 Unit(s) SubCutaneous every morning  insulin glargine Injectable (LANTUS) 35 Unit(s) SubCutaneous at bedtime  insulin lispro (ADMELOG) corrective regimen sliding scale   SubCutaneous three times a day before meals  insulin lispro Injectable (ADMELOG) 10 Unit(s) SubCutaneous three times a day before meals  lactulose Syrup 20 Gram(s) Oral daily  lidocaine   4% Patch 1 Patch Transdermal daily  melatonin 3 milliGRAM(s) Oral at bedtime PRN  metoprolol tartrate 25 milliGRAM(s) Oral every 6 hours  metoprolol tartrate Injectable 5 milliGRAM(s) IV Push every 6 hours PRN  multivitamin 1 Tablet(s) Oral daily  ondansetron Injectable 4 milliGRAM(s) IV Push every 8 hours PRN  oxyCODONE    IR 5 milliGRAM(s) Oral every 6 hours PRN  oxyCODONE    IR 10 milliGRAM(s) Oral every 6 hours PRN  oxyCODONE  ER Tablet 20 milliGRAM(s) Oral every 12 hours  remdesivir  IVPB   IV Intermittent   remdesivir  IVPB 100 milliGRAM(s) IV Intermittent every 24 hours  rivaroxaban 20 milliGRAM(s) Oral with dinner  senna 2 Tablet(s) Oral at bedtime                          13.5   9.88  )-----------( 234      ( 23 Jan 2024 05:40 )             43.0     01-23    135  |  89<L>  |  51.7<H>  ----------------------------<  347<H>  4.3   |  29.0  |  1.67<H>    Ca    10.0      23 Jan 2024 05:40  Mg     2.1     01-23    TPro  6.8  /  Alb  3.6  /  TBili  0.7  /  DBili  x   /  AST  16  /  ALT  22  /  AlkPhos  121<H>  01-23      Urinalysis Basic - ( 23 Jan 2024 05:40 )    Color: x / Appearance: x / SG: x / pH: x  Gluc: 347 mg/dL / Ketone: x  / Bili: x / Urobili: x   Blood: x / Protein: x / Nitrite: x   Leuk Esterase: x / RBC: x / WBC x   Sq Epi: x / Non Sq Epi: x / Bacteria: x        Pain Service   [phone]

## 2024-01-24 LAB
ALBUMIN SERPL ELPH-MCNC: 3.4 G/DL — SIGNIFICANT CHANGE UP (ref 3.3–5.2)
ALP SERPL-CCNC: 128 U/L — HIGH (ref 40–120)
ALT FLD-CCNC: 22 U/L — SIGNIFICANT CHANGE UP
ANION GAP SERPL CALC-SCNC: 17 MMOL/L — SIGNIFICANT CHANGE UP (ref 5–17)
ANION GAP SERPL CALC-SCNC: 18 MMOL/L — HIGH (ref 5–17)
ANISOCYTOSIS BLD QL: SLIGHT — SIGNIFICANT CHANGE UP
AST SERPL-CCNC: 30 U/L — SIGNIFICANT CHANGE UP
BASOPHILS # BLD AUTO: 0 K/UL — SIGNIFICANT CHANGE UP (ref 0–0.2)
BASOPHILS NFR BLD AUTO: 0 % — SIGNIFICANT CHANGE UP (ref 0–2)
BILIRUB SERPL-MCNC: 0.6 MG/DL — SIGNIFICANT CHANGE UP (ref 0.4–2)
BUN SERPL-MCNC: 59.9 MG/DL — HIGH (ref 8–20)
BUN SERPL-MCNC: 60.5 MG/DL — HIGH (ref 8–20)
CALCIUM SERPL-MCNC: 9.1 MG/DL — SIGNIFICANT CHANGE UP (ref 8.4–10.5)
CALCIUM SERPL-MCNC: 9.5 MG/DL — SIGNIFICANT CHANGE UP (ref 8.4–10.5)
CHLORIDE SERPL-SCNC: 88 MMOL/L — LOW (ref 96–108)
CHLORIDE SERPL-SCNC: 90 MMOL/L — LOW (ref 96–108)
CO2 SERPL-SCNC: 25 MMOL/L — SIGNIFICANT CHANGE UP (ref 22–29)
CO2 SERPL-SCNC: 29 MMOL/L — SIGNIFICANT CHANGE UP (ref 22–29)
CREAT SERPL-MCNC: 1.64 MG/DL — HIGH (ref 0.5–1.3)
CREAT SERPL-MCNC: 1.7 MG/DL — HIGH (ref 0.5–1.3)
EGFR: 32 ML/MIN/1.73M2 — LOW
EGFR: 34 ML/MIN/1.73M2 — LOW
EOSINOPHIL # BLD AUTO: 0 K/UL — SIGNIFICANT CHANGE UP (ref 0–0.5)
EOSINOPHIL NFR BLD AUTO: 0 % — SIGNIFICANT CHANGE UP (ref 0–6)
GIANT PLATELETS BLD QL SMEAR: PRESENT — SIGNIFICANT CHANGE UP
GLUCOSE BLDC GLUCOMTR-MCNC: 421 MG/DL — HIGH (ref 70–99)
GLUCOSE BLDC GLUCOMTR-MCNC: 451 MG/DL — CRITICAL HIGH (ref 70–99)
GLUCOSE BLDC GLUCOMTR-MCNC: 460 MG/DL — CRITICAL HIGH (ref 70–99)
GLUCOSE BLDC GLUCOMTR-MCNC: 483 MG/DL — CRITICAL HIGH (ref 70–99)
GLUCOSE BLDC GLUCOMTR-MCNC: 493 MG/DL — CRITICAL HIGH (ref 70–99)
GLUCOSE BLDC GLUCOMTR-MCNC: 509 MG/DL — CRITICAL HIGH (ref 70–99)
GLUCOSE BLDC GLUCOMTR-MCNC: 509 MG/DL — CRITICAL HIGH (ref 70–99)
GLUCOSE BLDC GLUCOMTR-MCNC: 560 MG/DL — CRITICAL HIGH (ref 70–99)
GLUCOSE SERPL-MCNC: 439 MG/DL — HIGH (ref 70–99)
GLUCOSE SERPL-MCNC: 447 MG/DL — HIGH (ref 70–99)
HCT VFR BLD CALC: 37.3 % — SIGNIFICANT CHANGE UP (ref 34.5–45)
HGB BLD-MCNC: 12.3 G/DL — SIGNIFICANT CHANGE UP (ref 11.5–15.5)
LYMPHOCYTES # BLD AUTO: 0.93 K/UL — LOW (ref 1–3.3)
LYMPHOCYTES # BLD AUTO: 7.1 % — LOW (ref 13–44)
MACROCYTES BLD QL: SLIGHT — SIGNIFICANT CHANGE UP
MAGNESIUM SERPL-MCNC: 1.9 MG/DL — SIGNIFICANT CHANGE UP (ref 1.6–2.6)
MAGNESIUM SERPL-MCNC: 2 MG/DL — SIGNIFICANT CHANGE UP (ref 1.6–2.6)
MANUAL SMEAR VERIFICATION: SIGNIFICANT CHANGE UP
MCHC RBC-ENTMCNC: 30 PG — SIGNIFICANT CHANGE UP (ref 27–34)
MCHC RBC-ENTMCNC: 33 GM/DL — SIGNIFICANT CHANGE UP (ref 32–36)
MCV RBC AUTO: 91 FL — SIGNIFICANT CHANGE UP (ref 80–100)
METAMYELOCYTES # FLD: 0.9 % — HIGH (ref 0–0)
MONOCYTES # BLD AUTO: 0.69 K/UL — SIGNIFICANT CHANGE UP (ref 0–0.9)
MONOCYTES NFR BLD AUTO: 5.3 % — SIGNIFICANT CHANGE UP (ref 2–14)
MYELOCYTES NFR BLD: 1.7 % — HIGH (ref 0–0)
NEUTROPHILS # BLD AUTO: 11.02 K/UL — HIGH (ref 1.8–7.4)
NEUTROPHILS NFR BLD AUTO: 83.2 % — HIGH (ref 43–77)
NEUTS BAND # BLD: 0.9 % — SIGNIFICANT CHANGE UP (ref 0–8)
NRBC # BLD: 1 /100 WBCS — HIGH (ref 0–0)
OVALOCYTES BLD QL SMEAR: SLIGHT — SIGNIFICANT CHANGE UP
PHOSPHATE SERPL-MCNC: 3.6 MG/DL — SIGNIFICANT CHANGE UP (ref 2.4–4.7)
PLAT MORPH BLD: NORMAL — SIGNIFICANT CHANGE UP
PLATELET # BLD AUTO: 232 K/UL — SIGNIFICANT CHANGE UP (ref 150–400)
POIKILOCYTOSIS BLD QL AUTO: SLIGHT — SIGNIFICANT CHANGE UP
POLYCHROMASIA BLD QL SMEAR: SLIGHT — SIGNIFICANT CHANGE UP
POTASSIUM SERPL-MCNC: 4.5 MMOL/L — SIGNIFICANT CHANGE UP (ref 3.5–5.3)
POTASSIUM SERPL-MCNC: 4.7 MMOL/L — SIGNIFICANT CHANGE UP (ref 3.5–5.3)
POTASSIUM SERPL-SCNC: 4.5 MMOL/L — SIGNIFICANT CHANGE UP (ref 3.5–5.3)
POTASSIUM SERPL-SCNC: 4.7 MMOL/L — SIGNIFICANT CHANGE UP (ref 3.5–5.3)
PROT SERPL-MCNC: 6.5 G/DL — LOW (ref 6.6–8.7)
RBC # BLD: 4.1 M/UL — SIGNIFICANT CHANGE UP (ref 3.8–5.2)
RBC # FLD: 14.5 % — SIGNIFICANT CHANGE UP (ref 10.3–14.5)
RBC BLD AUTO: ABNORMAL
SODIUM SERPL-SCNC: 133 MMOL/L — LOW (ref 135–145)
SODIUM SERPL-SCNC: 134 MMOL/L — LOW (ref 135–145)
VARIANT LYMPHS # BLD: 0.9 % — SIGNIFICANT CHANGE UP (ref 0–6)
WBC # BLD: 13.1 K/UL — HIGH (ref 3.8–10.5)
WBC # FLD AUTO: 13.1 K/UL — HIGH (ref 3.8–10.5)

## 2024-01-24 PROCEDURE — 99232 SBSQ HOSP IP/OBS MODERATE 35: CPT

## 2024-01-24 RX ORDER — CYCLOBENZAPRINE HYDROCHLORIDE 10 MG/1
10 TABLET, FILM COATED ORAL THREE TIMES A DAY
Refills: 0 | Status: DISCONTINUED | OUTPATIENT
Start: 2024-01-24 | End: 2024-02-01

## 2024-01-24 RX ORDER — INSULIN GLARGINE 100 [IU]/ML
40 INJECTION, SOLUTION SUBCUTANEOUS AT BEDTIME
Refills: 0 | Status: DISCONTINUED | OUTPATIENT
Start: 2024-01-24 | End: 2024-02-01

## 2024-01-24 RX ORDER — INSULIN LISPRO 100/ML
10 VIAL (ML) SUBCUTANEOUS ONCE
Refills: 0 | Status: COMPLETED | OUTPATIENT
Start: 2024-01-24 | End: 2024-01-24

## 2024-01-24 RX ORDER — INSULIN LISPRO 100/ML
12 VIAL (ML) SUBCUTANEOUS
Refills: 0 | Status: DISCONTINUED | OUTPATIENT
Start: 2024-01-24 | End: 2024-02-01

## 2024-01-24 RX ORDER — INSULIN GLARGINE 100 [IU]/ML
45 INJECTION, SOLUTION SUBCUTANEOUS EVERY MORNING
Refills: 0 | Status: DISCONTINUED | OUTPATIENT
Start: 2024-01-24 | End: 2024-02-01

## 2024-01-24 RX ADMIN — OXYCODONE HYDROCHLORIDE 20 MILLIGRAM(S): 5 TABLET ORAL at 07:53

## 2024-01-24 RX ADMIN — Medication 13: at 08:47

## 2024-01-24 RX ADMIN — OXYCODONE HYDROCHLORIDE 5 MILLIGRAM(S): 5 TABLET ORAL at 09:32

## 2024-01-24 RX ADMIN — Medication 100 MILLIGRAM(S): at 05:16

## 2024-01-24 RX ADMIN — Medication 1 TABLET(S): at 12:54

## 2024-01-24 RX ADMIN — CYCLOBENZAPRINE HYDROCHLORIDE 5 MILLIGRAM(S): 10 TABLET, FILM COATED ORAL at 02:58

## 2024-01-24 RX ADMIN — Medication 13: at 17:37

## 2024-01-24 RX ADMIN — BUMETANIDE 2 MILLIGRAM(S): 0.25 INJECTION INTRAMUSCULAR; INTRAVENOUS at 05:16

## 2024-01-24 RX ADMIN — LACTULOSE 20 GRAM(S): 10 SOLUTION ORAL at 12:55

## 2024-01-24 RX ADMIN — OXYCODONE HYDROCHLORIDE 5 MILLIGRAM(S): 5 TABLET ORAL at 10:32

## 2024-01-24 RX ADMIN — Medication 6 MILLIGRAM(S): at 05:16

## 2024-01-24 RX ADMIN — BUDESONIDE AND FORMOTEROL FUMARATE DIHYDRATE 2 PUFF(S): 160; 4.5 AEROSOL RESPIRATORY (INHALATION) at 09:34

## 2024-01-24 RX ADMIN — OXYCODONE HYDROCHLORIDE 20 MILLIGRAM(S): 5 TABLET ORAL at 18:38

## 2024-01-24 RX ADMIN — Medication 10 UNIT(S): at 21:17

## 2024-01-24 RX ADMIN — Medication 13: at 12:53

## 2024-01-24 RX ADMIN — INSULIN GLARGINE 40 UNIT(S): 100 INJECTION, SOLUTION SUBCUTANEOUS at 21:02

## 2024-01-24 RX ADMIN — Medication 12 UNIT(S): at 17:37

## 2024-01-24 RX ADMIN — Medication 10 UNIT(S): at 22:21

## 2024-01-24 RX ADMIN — OXYCODONE HYDROCHLORIDE 10 MILLIGRAM(S): 5 TABLET ORAL at 21:17

## 2024-01-24 RX ADMIN — OXYCODONE HYDROCHLORIDE 20 MILLIGRAM(S): 5 TABLET ORAL at 06:53

## 2024-01-24 RX ADMIN — OXYCODONE HYDROCHLORIDE 10 MILLIGRAM(S): 5 TABLET ORAL at 22:17

## 2024-01-24 RX ADMIN — REMDESIVIR 200 MILLIGRAM(S): 5 INJECTION INTRAVENOUS at 12:52

## 2024-01-24 RX ADMIN — CHLORHEXIDINE GLUCONATE 1 APPLICATION(S): 213 SOLUTION TOPICAL at 05:10

## 2024-01-24 RX ADMIN — RIVAROXABAN 20 MILLIGRAM(S): KIT at 17:38

## 2024-01-24 RX ADMIN — ATORVASTATIN CALCIUM 80 MILLIGRAM(S): 80 TABLET, FILM COATED ORAL at 21:17

## 2024-01-24 RX ADMIN — OXYCODONE HYDROCHLORIDE 20 MILLIGRAM(S): 5 TABLET ORAL at 17:38

## 2024-01-24 RX ADMIN — Medication 12 UNIT(S): at 12:53

## 2024-01-24 RX ADMIN — INSULIN GLARGINE 45 UNIT(S): 100 INJECTION, SOLUTION SUBCUTANEOUS at 08:46

## 2024-01-24 RX ADMIN — SENNA PLUS 2 TABLET(S): 8.6 TABLET ORAL at 21:16

## 2024-01-24 RX ADMIN — CLOTRIMAZOLE AND BETAMETHASONE DIPROPIONATE 1 APPLICATION(S): 10; .5 CREAM TOPICAL at 21:08

## 2024-01-24 RX ADMIN — CLOTRIMAZOLE AND BETAMETHASONE DIPROPIONATE 1 APPLICATION(S): 10; .5 CREAM TOPICAL at 05:10

## 2024-01-24 RX ADMIN — FAMOTIDINE 20 MILLIGRAM(S): 10 INJECTION INTRAVENOUS at 12:54

## 2024-01-24 RX ADMIN — Medication 0.25 MILLIGRAM(S): at 21:16

## 2024-01-24 RX ADMIN — Medication 0.25 MILLIGRAM(S): at 13:25

## 2024-01-24 NOTE — PROGRESS NOTE ADULT - SUBJECTIVE AND OBJECTIVE BOX
AGUEDA LUIS    [id number]    68y      Female    INTERVAL HPI/OVERNIGHT EVENTS:  patient being seen for covid and dm2. Patient seen at bedside and complains of pain.       REVIEW OF SYSTEMS:    CONSTITUTIONAL: pain  RESPIRATORY: No cough, wheezing, hemoptysis; No shortness of breath  CARDIOVASCULAR: No chest pain, palpitations  GASTROINTESTINAL: No abdominal or epigastric pain. No nausea, vomiting  NEUROLOGICAL: No headaches, memory loss, loss of strength.  MISCELLANEOUS:      Vital Signs Last 24 Hrs  T(C): 36.7 (24 Jan 2024 08:00), Max: 36.9 (23 Jan 2024 14:15)  T(F): 98 (24 Jan 2024 08:00), Max: 98.5 (23 Jan 2024 14:15)  HR: 100 (24 Jan 2024 08:00) (74 - 102)  BP: 117/63 (24 Jan 2024 08:00) (100/69 - 118/63)  BP(mean): --  RR: 19 (24 Jan 2024 08:00) (18 - 20)  SpO2: 98% (24 Jan 2024 08:00) (95% - 98%)    Parameters below as of 24 Jan 2024 08:00  Patient On (Oxygen Delivery Method): nasal cannula        PHYSICAL EXAM:    · Constitutional	well-groomed; obese  · Eyes	PERRL; EOMI; conjunctiva clear  · Respiratory	no wheezes; no respiratory distress; respirations non-labored  · Cardiovascular	S1 S2 present; Irregularly irregular rhythm; pedal edema  · Pedal Edema Severity	chronic lymphedema b/l  · Gastrointestinal	soft; nontender; nondistended; normal active bowel sounds  · Neurological	sensation intact; responds to verbal commands  · Skin	warm and dry  · Skin Comments	venous chronic changes on b/l LE; erythema, lymphedema  · Musculoskeletal	no joint swelling; no joint erythema; no joint warmth; no calf tenderness  · Psychiatric	normal affect; alert and oriented x3; normal behavior    LABS:                        12.3   13.10 )-----------( 232      ( 24 Jan 2024 06:52 )             37.3     01-24    134<L>  |  88<L>  |  60.5<H>  ----------------------------<  447<H>  4.5   |  29.0  |  1.70<H>    Ca    9.5      24 Jan 2024 06:52  Mg     2.0     01-24    TPro  6.5<L>  /  Alb  3.4  /  TBili  0.6  /  DBili  x   /  AST  30  /  ALT  22  /  AlkPhos  128<H>  01-24      Urinalysis Basic - ( 24 Jan 2024 06:52 )    Color: x / Appearance: x / SG: x / pH: x  Gluc: 447 mg/dL / Ketone: x  / Bili: x / Urobili: x   Blood: x / Protein: x / Nitrite: x   Leuk Esterase: x / RBC: x / WBC x   Sq Epi: x / Non Sq Epi: x / Bacteria: x          MEDICATIONS  (STANDING):  atorvastatin 80 milliGRAM(s) Oral at bedtime  budesonide  80 MICROgram(s)/formoterol 4.5 MICROgram(s) Inhaler 2 Puff(s) Inhalation two times a day  buMETAnide 2 milliGRAM(s) Oral daily  chlorhexidine 2% Cloths 1 Application(s) Topical <User Schedule>  clotrimazole/betamethasone Cream 1 Application(s) Topical two times a day  dexAMETHasone     Tablet 6 milliGRAM(s) Oral daily  dextrose 5%. 1000 milliLiter(s) (100 mL/Hr) IV Continuous <Continuous>  dextrose 5%. 1000 milliLiter(s) (50 mL/Hr) IV Continuous <Continuous>  dextrose 50% Injectable 12.5 Gram(s) IV Push once  dextrose 50% Injectable 25 Gram(s) IV Push once  dextrose 50% Injectable 25 Gram(s) IV Push once  famotidine    Tablet 20 milliGRAM(s) Oral daily  glucagon  Injectable 1 milliGRAM(s) IntraMuscular once  insulin glargine Injectable (LANTUS) 45 Unit(s) SubCutaneous every morning  insulin glargine Injectable (LANTUS) 40 Unit(s) SubCutaneous at bedtime  insulin lispro (ADMELOG) corrective regimen sliding scale   SubCutaneous three times a day before meals  insulin lispro Injectable (ADMELOG) 12 Unit(s) SubCutaneous three times a day before meals  lactulose Syrup 20 Gram(s) Oral daily  lidocaine   4% Patch 1 Patch Transdermal daily  metoprolol succinate  milliGRAM(s) Oral daily  multivitamin 1 Tablet(s) Oral daily  oxyCODONE  ER Tablet 20 milliGRAM(s) Oral every 12 hours  remdesivir  IVPB 100 milliGRAM(s) IV Intermittent every 24 hours  remdesivir  IVPB   IV Intermittent   rivaroxaban 20 milliGRAM(s) Oral with dinner  senna 2 Tablet(s) Oral at bedtime    MEDICATIONS  (PRN):  acetaminophen     Tablet .. 650 milliGRAM(s) Oral every 6 hours PRN Temp greater or equal to 38C (100.4F), Mild Pain (1 - 3)  albuterol    90 MICROgram(s) HFA Inhaler 2 Puff(s) Inhalation every 6 hours PRN Bronchospasm  ALPRAZolam 0.25 milliGRAM(s) Oral every 8 hours PRN for anxiety/Sleep  aluminum hydroxide/magnesium hydroxide/simethicone Suspension 30 milliLiter(s) Oral every 4 hours PRN Dyspepsia  cyclobenzaprine 10 milliGRAM(s) Oral three times a day PRN Muscle Spasm  dextrose Oral Gel 15 Gram(s) Oral once PRN Blood Glucose LESS THAN 70 milliGRAM(s)/deciliter  melatonin 3 milliGRAM(s) Oral at bedtime PRN Insomnia  metoprolol tartrate Injectable 5 milliGRAM(s) IV Push every 6 hours PRN for heart rate above 110 bpm  ondansetron Injectable 4 milliGRAM(s) IV Push every 8 hours PRN Nausea and/or Vomiting  oxyCODONE    IR 5 milliGRAM(s) Oral every 6 hours PRN Moderate Pain (4 - 6)  oxyCODONE    IR 10 milliGRAM(s) Oral every 6 hours PRN Severe Pain (7 - 10)      RADIOLOGY & ADDITIONAL TESTS:

## 2024-01-24 NOTE — PROVIDER CONTACT NOTE (CHANGE IN STATUS NOTIFICATION) - ACTION/TREATMENT ORDERED:
Patient blood sugar trending in high 400s and 500s. Adjusted insulin this morning. Give insulin now.
Give insulin now. Monitor patient's food choices.

## 2024-01-24 NOTE — PROGRESS NOTE ADULT - ASSESSMENT
67 y/o female with PMH of HTN, HLD, DM2, HFpEF, CKD3, DVT, Uterine CA, afib on Xarelto, COPD on home O2 4L, chronic lymphedema sent from [facility] to the ED for  chest pain. Patient reported pressure pain located on the left side, non-radiating, associated with shortness of breath. In the ED, VBG done, respiratory acidosis, BiPAP offered but patient reportedly declined. Lasix given for possible CHF ex; cardiology consulted.     Shortness of breath - secondary to covid  - c.w remdesvir and decadrone  Patient on oxygen 4L (home dose)      afib-  xarelto   metoprolol    CKD-3   Patient on diuretic chronically   Monitor renal function     HTN/HLD   Atorvastatin 80mg   toprol    DM-2   Lantus bid and ssi     Hx of spinal stenosis  Lidocaine patch daily   pain amanegemt following    floaters - ct head neg  patient states she is allergic to robaxin will place on allergy list  - order flexeril     dispo - dc to [facility]   spoke to max hunter and patient

## 2024-01-25 LAB
ALBUMIN SERPL ELPH-MCNC: 3.6 G/DL — SIGNIFICANT CHANGE UP (ref 3.3–5.2)
ALP SERPL-CCNC: 107 U/L — SIGNIFICANT CHANGE UP (ref 40–120)
ALT FLD-CCNC: 20 U/L — SIGNIFICANT CHANGE UP
ANION GAP SERPL CALC-SCNC: 12 MMOL/L — SIGNIFICANT CHANGE UP (ref 5–17)
AST SERPL-CCNC: 18 U/L — SIGNIFICANT CHANGE UP
BILIRUB SERPL-MCNC: 0.6 MG/DL — SIGNIFICANT CHANGE UP (ref 0.4–2)
BUN SERPL-MCNC: 59.8 MG/DL — HIGH (ref 8–20)
CALCIUM SERPL-MCNC: 9.7 MG/DL — SIGNIFICANT CHANGE UP (ref 8.4–10.5)
CHLORIDE SERPL-SCNC: 91 MMOL/L — LOW (ref 96–108)
CO2 SERPL-SCNC: 35 MMOL/L — HIGH (ref 22–29)
CREAT SERPL-MCNC: 1.45 MG/DL — HIGH (ref 0.5–1.3)
EGFR: 39 ML/MIN/1.73M2 — LOW
GLUCOSE BLDC GLUCOMTR-MCNC: 306 MG/DL — HIGH (ref 70–99)
GLUCOSE BLDC GLUCOMTR-MCNC: 343 MG/DL — HIGH (ref 70–99)
GLUCOSE BLDC GLUCOMTR-MCNC: 345 MG/DL — HIGH (ref 70–99)
GLUCOSE BLDC GLUCOMTR-MCNC: 345 MG/DL — HIGH (ref 70–99)
GLUCOSE BLDC GLUCOMTR-MCNC: 387 MG/DL — HIGH (ref 70–99)
GLUCOSE BLDC GLUCOMTR-MCNC: 426 MG/DL — HIGH (ref 70–99)
GLUCOSE SERPL-MCNC: 323 MG/DL — HIGH (ref 70–99)
HCT VFR BLD CALC: 40.8 % — SIGNIFICANT CHANGE UP (ref 34.5–45)
HGB BLD-MCNC: 13.2 G/DL — SIGNIFICANT CHANGE UP (ref 11.5–15.5)
MCHC RBC-ENTMCNC: 29.3 PG — SIGNIFICANT CHANGE UP (ref 27–34)
MCHC RBC-ENTMCNC: 32.4 GM/DL — SIGNIFICANT CHANGE UP (ref 32–36)
MCV RBC AUTO: 90.5 FL — SIGNIFICANT CHANGE UP (ref 80–100)
PLATELET # BLD AUTO: 259 K/UL — SIGNIFICANT CHANGE UP (ref 150–400)
POTASSIUM SERPL-MCNC: 3.8 MMOL/L — SIGNIFICANT CHANGE UP (ref 3.5–5.3)
POTASSIUM SERPL-SCNC: 3.8 MMOL/L — SIGNIFICANT CHANGE UP (ref 3.5–5.3)
PROT SERPL-MCNC: 6.5 G/DL — LOW (ref 6.6–8.7)
RBC # BLD: 4.51 M/UL — SIGNIFICANT CHANGE UP (ref 3.8–5.2)
RBC # FLD: 14.3 % — SIGNIFICANT CHANGE UP (ref 10.3–14.5)
SODIUM SERPL-SCNC: 138 MMOL/L — SIGNIFICANT CHANGE UP (ref 135–145)
WBC # BLD: 13.47 K/UL — HIGH (ref 3.8–10.5)
WBC # FLD AUTO: 13.47 K/UL — HIGH (ref 3.8–10.5)

## 2024-01-25 PROCEDURE — 99232 SBSQ HOSP IP/OBS MODERATE 35: CPT

## 2024-01-25 RX ORDER — OXYCODONE HYDROCHLORIDE 5 MG/1
30 TABLET ORAL EVERY 12 HOURS
Refills: 0 | Status: DISCONTINUED | OUTPATIENT
Start: 2024-01-25 | End: 2024-02-01

## 2024-01-25 RX ORDER — INSULIN LISPRO 100/ML
7 VIAL (ML) SUBCUTANEOUS ONCE
Refills: 0 | Status: COMPLETED | OUTPATIENT
Start: 2024-01-25 | End: 2024-01-25

## 2024-01-25 RX ORDER — SODIUM CHLORIDE 9 MG/ML
500 INJECTION INTRAMUSCULAR; INTRAVENOUS; SUBCUTANEOUS ONCE
Refills: 0 | Status: COMPLETED | OUTPATIENT
Start: 2024-01-25 | End: 2024-01-25

## 2024-01-25 RX ADMIN — LACTULOSE 20 GRAM(S): 10 SOLUTION ORAL at 17:50

## 2024-01-25 RX ADMIN — FAMOTIDINE 20 MILLIGRAM(S): 10 INJECTION INTRAVENOUS at 17:50

## 2024-01-25 RX ADMIN — Medication 7 UNIT(S): at 23:12

## 2024-01-25 RX ADMIN — OXYCODONE HYDROCHLORIDE 30 MILLIGRAM(S): 5 TABLET ORAL at 18:47

## 2024-01-25 RX ADMIN — Medication 12 UNIT(S): at 17:48

## 2024-01-25 RX ADMIN — INSULIN GLARGINE 40 UNIT(S): 100 INJECTION, SOLUTION SUBCUTANEOUS at 22:34

## 2024-01-25 RX ADMIN — SENNA PLUS 2 TABLET(S): 8.6 TABLET ORAL at 22:34

## 2024-01-25 RX ADMIN — OXYCODONE HYDROCHLORIDE 10 MILLIGRAM(S): 5 TABLET ORAL at 23:34

## 2024-01-25 RX ADMIN — CHLORHEXIDINE GLUCONATE 1 APPLICATION(S): 213 SOLUTION TOPICAL at 05:11

## 2024-01-25 RX ADMIN — OXYCODONE HYDROCHLORIDE 10 MILLIGRAM(S): 5 TABLET ORAL at 22:34

## 2024-01-25 RX ADMIN — SODIUM CHLORIDE 250 MILLILITER(S): 9 INJECTION INTRAMUSCULAR; INTRAVENOUS; SUBCUTANEOUS at 01:06

## 2024-01-25 RX ADMIN — OXYCODONE HYDROCHLORIDE 30 MILLIGRAM(S): 5 TABLET ORAL at 08:28

## 2024-01-25 RX ADMIN — BUMETANIDE 2 MILLIGRAM(S): 0.25 INJECTION INTRAMUSCULAR; INTRAVENOUS at 05:13

## 2024-01-25 RX ADMIN — OXYCODONE HYDROCHLORIDE 30 MILLIGRAM(S): 5 TABLET ORAL at 09:28

## 2024-01-25 RX ADMIN — Medication 100 MILLIGRAM(S): at 05:13

## 2024-01-25 RX ADMIN — ATORVASTATIN CALCIUM 80 MILLIGRAM(S): 80 TABLET, FILM COATED ORAL at 22:34

## 2024-01-25 RX ADMIN — OXYCODONE HYDROCHLORIDE 20 MILLIGRAM(S): 5 TABLET ORAL at 06:44

## 2024-01-25 RX ADMIN — CLOTRIMAZOLE AND BETAMETHASONE DIPROPIONATE 1 APPLICATION(S): 10; .5 CREAM TOPICAL at 17:50

## 2024-01-25 RX ADMIN — REMDESIVIR 200 MILLIGRAM(S): 5 INJECTION INTRAVENOUS at 17:47

## 2024-01-25 RX ADMIN — Medication 12 UNIT(S): at 08:30

## 2024-01-25 RX ADMIN — CLOTRIMAZOLE AND BETAMETHASONE DIPROPIONATE 1 APPLICATION(S): 10; .5 CREAM TOPICAL at 05:11

## 2024-01-25 RX ADMIN — RIVAROXABAN 20 MILLIGRAM(S): KIT at 17:49

## 2024-01-25 RX ADMIN — CYCLOBENZAPRINE HYDROCHLORIDE 10 MILLIGRAM(S): 10 TABLET, FILM COATED ORAL at 01:36

## 2024-01-25 RX ADMIN — Medication 9: at 08:29

## 2024-01-25 RX ADMIN — Medication 6 MILLIGRAM(S): at 05:13

## 2024-01-25 RX ADMIN — Medication 9: at 17:48

## 2024-01-25 RX ADMIN — Medication 1 TABLET(S): at 17:49

## 2024-01-25 RX ADMIN — OXYCODONE HYDROCHLORIDE 30 MILLIGRAM(S): 5 TABLET ORAL at 17:47

## 2024-01-25 RX ADMIN — BUDESONIDE AND FORMOTEROL FUMARATE DIHYDRATE 2 PUFF(S): 160; 4.5 AEROSOL RESPIRATORY (INHALATION) at 13:18

## 2024-01-25 RX ADMIN — INSULIN GLARGINE 45 UNIT(S): 100 INJECTION, SOLUTION SUBCUTANEOUS at 08:28

## 2024-01-25 RX ADMIN — OXYCODONE HYDROCHLORIDE 20 MILLIGRAM(S): 5 TABLET ORAL at 06:08

## 2024-01-25 RX ADMIN — Medication 0.25 MILLIGRAM(S): at 22:34

## 2024-01-25 RX ADMIN — Medication 0.25 MILLIGRAM(S): at 08:32

## 2024-01-25 NOTE — PROGRESS NOTE ADULT - ASSESSMENT
67 y/o female with PMH of HTN, HLD, DM2, HFpEF, CKD3, DVT, Uterine CA, afib on Xarelto, COPD on home O2 4L, chronic lymphedema sent from [facility] to the ED for  chest pain. Patient reported pressure pain located on the left side, non-radiating, associated with shortness of breath. In the ED, VBG done, respiratory acidosis, BiPAP offered but patient reportedly declined. Lasix given for possible CHF ex; cardiology consulted.     Shortness of breath  Multifactorial etiology: COPD, CHFpEF and morbid obesity   bumex po   Patient on oxygen 4L (home dose)     remdesivir last dose today     afib- needs ep eval as per cardio   xarelto   metoprolol    CKD-3   Patient on diuretic chronically   Monitor renal function   cr improved    HTN/HLD   Atorvastatin 80mg   Lopressor    DM-2   Lantus bid and ssi   dc decadron    Hx of spinal stenosis  Lidocaine patch daily     dc tomorrow to [facility]

## 2024-01-25 NOTE — PROGRESS NOTE ADULT - SUBJECTIVE AND OBJECTIVE BOX
AGUEDA LUIS    [id number]    68y      Female    INTERVAL HPI/OVERNIGHT EVENTS:  patient being seen for covid and morbid obesity. Patient seen at bedside and states she still has pain.       REVIEW OF SYSTEMS:    CONSTITUTIONAL: No fever, weight loss, or fatigue  RESPIRATORY: No cough, wheezing, hemoptysis; No shortness of breath  CARDIOVASCULAR: No chest pain, palpitations  GASTROINTESTINAL: No abdominal or epigastric pain. No nausea, vomiting  NEUROLOGICAL: No headaches, memory loss, loss of strength.  MISCELLANEOUS:      Vital Signs Last 24 Hrs  T(C): 36.4 (25 Jan 2024 11:07), Max: 36.6 (24 Jan 2024 22:00)  T(F): 97.5 (25 Jan 2024 11:07), Max: 97.9 (24 Jan 2024 22:00)  HR: 100 (25 Jan 2024 11:07) (64 - 100)  BP: 119/73 (25 Jan 2024 11:07) (113/67 - 120/73)  BP(mean): --  RR: 18 (25 Jan 2024 11:07) (18 - 18)  SpO2: 94% (25 Jan 2024 11:07) (94% - 98%)    Parameters below as of 25 Jan 2024 11:07  Patient On (Oxygen Delivery Method): nasal cannula        PHYSICAL EXAM:    · Constitutional	well-groomed; obese  · Eyes	PERRL; EOMI; conjunctiva clear  · Respiratory	no wheezes; no respiratory distress; respirations non-labored  · Cardiovascular	S1 S2 present; Irregularly irregular rhythm; pedal edema  · Pedal Edema Severity	chronic lymphedema b/l  · Gastrointestinal	soft; nontender; nondistended; normal active bowel sounds  · Neurological	sensation intact; responds to verbal commands  · Skin	warm and dry  · Skin Comments	venous chronic changes on b/l LE; erythema, lymphedema  · Musculoskeletal	no joint swelling; no joint erythema; no joint warmth; no calf tenderness  · Psychiatric	normal affect; alert and oriented x3; normal behavior        LABS:                        13.2   13.47 )-----------( 259      ( 25 Jan 2024 08:14 )             40.8     01-25    138  |  91<L>  |  59.8<H>  ----------------------------<  323<H>  3.8   |  35.0<H>  |  1.45<H>    Ca    9.7      25 Jan 2024 08:14  Phos  3.6     01-24  Mg     1.9     01-24    TPro  6.5<L>  /  Alb  3.6  /  TBili  0.6  /  DBili  x   /  AST  18  /  ALT  20  /  AlkPhos  107  01-25      Urinalysis Basic - ( 25 Jan 2024 08:14 )    Color: x / Appearance: x / SG: x / pH: x  Gluc: 323 mg/dL / Ketone: x  / Bili: x / Urobili: x   Blood: x / Protein: x / Nitrite: x   Leuk Esterase: x / RBC: x / WBC x   Sq Epi: x / Non Sq Epi: x / Bacteria: x          MEDICATIONS  (STANDING):  atorvastatin 80 milliGRAM(s) Oral at bedtime  budesonide  80 MICROgram(s)/formoterol 4.5 MICROgram(s) Inhaler 2 Puff(s) Inhalation two times a day  buMETAnide 2 milliGRAM(s) Oral daily  chlorhexidine 2% Cloths 1 Application(s) Topical <User Schedule>  clotrimazole/betamethasone Cream 1 Application(s) Topical two times a day  dextrose 5%. 1000 milliLiter(s) (100 mL/Hr) IV Continuous <Continuous>  dextrose 5%. 1000 milliLiter(s) (50 mL/Hr) IV Continuous <Continuous>  dextrose 50% Injectable 12.5 Gram(s) IV Push once  dextrose 50% Injectable 25 Gram(s) IV Push once  dextrose 50% Injectable 25 Gram(s) IV Push once  famotidine    Tablet 20 milliGRAM(s) Oral daily  glucagon  Injectable 1 milliGRAM(s) IntraMuscular once  insulin glargine Injectable (LANTUS) 45 Unit(s) SubCutaneous every morning  insulin glargine Injectable (LANTUS) 40 Unit(s) SubCutaneous at bedtime  insulin lispro (ADMELOG) corrective regimen sliding scale   SubCutaneous three times a day before meals  insulin lispro Injectable (ADMELOG) 12 Unit(s) SubCutaneous three times a day before meals  lactulose Syrup 20 Gram(s) Oral daily  lidocaine   4% Patch 1 Patch Transdermal daily  metoprolol succinate  milliGRAM(s) Oral daily  multivitamin 1 Tablet(s) Oral daily  oxyCODONE  ER Tablet 30 milliGRAM(s) Oral every 12 hours  remdesivir  IVPB 100 milliGRAM(s) IV Intermittent every 24 hours  remdesivir  IVPB   IV Intermittent   rivaroxaban 20 milliGRAM(s) Oral with dinner  senna 2 Tablet(s) Oral at bedtime    MEDICATIONS  (PRN):  acetaminophen     Tablet .. 650 milliGRAM(s) Oral every 6 hours PRN Temp greater or equal to 38C (100.4F), Mild Pain (1 - 3)  albuterol    90 MICROgram(s) HFA Inhaler 2 Puff(s) Inhalation every 6 hours PRN Bronchospasm  ALPRAZolam 0.25 milliGRAM(s) Oral every 8 hours PRN for anxiety/Sleep  aluminum hydroxide/magnesium hydroxide/simethicone Suspension 30 milliLiter(s) Oral every 4 hours PRN Dyspepsia  cyclobenzaprine 10 milliGRAM(s) Oral three times a day PRN Muscle Spasm  dextrose Oral Gel 15 Gram(s) Oral once PRN Blood Glucose LESS THAN 70 milliGRAM(s)/deciliter  melatonin 3 milliGRAM(s) Oral at bedtime PRN Insomnia  metoprolol tartrate Injectable 5 milliGRAM(s) IV Push every 6 hours PRN for heart rate above 110 bpm  ondansetron Injectable 4 milliGRAM(s) IV Push every 8 hours PRN Nausea and/or Vomiting  oxyCODONE    IR 5 milliGRAM(s) Oral every 6 hours PRN Moderate Pain (4 - 6)  oxyCODONE    IR 10 milliGRAM(s) Oral every 6 hours PRN Severe Pain (7 - 10)      RADIOLOGY & ADDITIONAL TESTS:

## 2024-01-26 LAB
ALBUMIN SERPL ELPH-MCNC: 3.6 G/DL — SIGNIFICANT CHANGE UP (ref 3.3–5.2)
ALP SERPL-CCNC: 105 U/L — SIGNIFICANT CHANGE UP (ref 40–120)
ALT FLD-CCNC: 22 U/L — SIGNIFICANT CHANGE UP
ANION GAP SERPL CALC-SCNC: 12 MMOL/L — SIGNIFICANT CHANGE UP (ref 5–17)
AST SERPL-CCNC: 17 U/L — SIGNIFICANT CHANGE UP
BASOPHILS # BLD AUTO: 0.13 K/UL — SIGNIFICANT CHANGE UP (ref 0–0.2)
BASOPHILS NFR BLD AUTO: 0.9 % — SIGNIFICANT CHANGE UP (ref 0–2)
BILIRUB SERPL-MCNC: 0.6 MG/DL — SIGNIFICANT CHANGE UP (ref 0.4–2)
BUN SERPL-MCNC: 60.4 MG/DL — HIGH (ref 8–20)
CALCIUM SERPL-MCNC: 9.5 MG/DL — SIGNIFICANT CHANGE UP (ref 8.4–10.5)
CHLORIDE SERPL-SCNC: 89 MMOL/L — LOW (ref 96–108)
CO2 SERPL-SCNC: 33 MMOL/L — HIGH (ref 22–29)
CREAT SERPL-MCNC: 1.35 MG/DL — HIGH (ref 0.5–1.3)
EGFR: 43 ML/MIN/1.73M2 — LOW
EOSINOPHIL # BLD AUTO: 0 K/UL — SIGNIFICANT CHANGE UP (ref 0–0.5)
EOSINOPHIL NFR BLD AUTO: 0 % — SIGNIFICANT CHANGE UP (ref 0–6)
GLUCOSE BLDC GLUCOMTR-MCNC: 171 MG/DL — HIGH (ref 70–99)
GLUCOSE BLDC GLUCOMTR-MCNC: 206 MG/DL — HIGH (ref 70–99)
GLUCOSE BLDC GLUCOMTR-MCNC: 271 MG/DL — HIGH (ref 70–99)
GLUCOSE BLDC GLUCOMTR-MCNC: 316 MG/DL — HIGH (ref 70–99)
GLUCOSE SERPL-MCNC: 273 MG/DL — HIGH (ref 70–99)
HCT VFR BLD CALC: 40.9 % — SIGNIFICANT CHANGE UP (ref 34.5–45)
HGB BLD-MCNC: 13.6 G/DL — SIGNIFICANT CHANGE UP (ref 11.5–15.5)
IMM GRANULOCYTES NFR BLD AUTO: 2.9 % — HIGH (ref 0–0.9)
LYMPHOCYTES # BLD AUTO: 1.88 K/UL — SIGNIFICANT CHANGE UP (ref 1–3.3)
LYMPHOCYTES # BLD AUTO: 12.5 % — LOW (ref 13–44)
MAGNESIUM SERPL-MCNC: 1.9 MG/DL — SIGNIFICANT CHANGE UP (ref 1.6–2.6)
MCHC RBC-ENTMCNC: 30 PG — SIGNIFICANT CHANGE UP (ref 27–34)
MCHC RBC-ENTMCNC: 33.3 GM/DL — SIGNIFICANT CHANGE UP (ref 32–36)
MCV RBC AUTO: 90.1 FL — SIGNIFICANT CHANGE UP (ref 80–100)
MONOCYTES # BLD AUTO: 1.42 K/UL — HIGH (ref 0–0.9)
MONOCYTES NFR BLD AUTO: 9.5 % — SIGNIFICANT CHANGE UP (ref 2–14)
NEUTROPHILS # BLD AUTO: 11.15 K/UL — HIGH (ref 1.8–7.4)
NEUTROPHILS NFR BLD AUTO: 74.2 % — SIGNIFICANT CHANGE UP (ref 43–77)
PLATELET # BLD AUTO: 250 K/UL — SIGNIFICANT CHANGE UP (ref 150–400)
POTASSIUM SERPL-MCNC: 3.7 MMOL/L — SIGNIFICANT CHANGE UP (ref 3.5–5.3)
POTASSIUM SERPL-SCNC: 3.7 MMOL/L — SIGNIFICANT CHANGE UP (ref 3.5–5.3)
PROT SERPL-MCNC: 6.4 G/DL — LOW (ref 6.6–8.7)
RBC # BLD: 4.54 M/UL — SIGNIFICANT CHANGE UP (ref 3.8–5.2)
RBC # FLD: 14.1 % — SIGNIFICANT CHANGE UP (ref 10.3–14.5)
SODIUM SERPL-SCNC: 134 MMOL/L — LOW (ref 135–145)
WBC # BLD: 15.02 K/UL — HIGH (ref 3.8–10.5)
WBC # FLD AUTO: 15.02 K/UL — HIGH (ref 3.8–10.5)

## 2024-01-26 PROCEDURE — 99232 SBSQ HOSP IP/OBS MODERATE 35: CPT

## 2024-01-26 RX ADMIN — OXYCODONE HYDROCHLORIDE 30 MILLIGRAM(S): 5 TABLET ORAL at 18:01

## 2024-01-26 RX ADMIN — INSULIN GLARGINE 45 UNIT(S): 100 INJECTION, SOLUTION SUBCUTANEOUS at 09:10

## 2024-01-26 RX ADMIN — FAMOTIDINE 20 MILLIGRAM(S): 10 INJECTION INTRAVENOUS at 11:37

## 2024-01-26 RX ADMIN — CLOTRIMAZOLE AND BETAMETHASONE DIPROPIONATE 1 APPLICATION(S): 10; .5 CREAM TOPICAL at 05:34

## 2024-01-26 RX ADMIN — BUMETANIDE 2 MILLIGRAM(S): 0.25 INJECTION INTRAMUSCULAR; INTRAVENOUS at 05:32

## 2024-01-26 RX ADMIN — Medication 12 UNIT(S): at 09:09

## 2024-01-26 RX ADMIN — Medication 12 UNIT(S): at 18:02

## 2024-01-26 RX ADMIN — LACTULOSE 20 GRAM(S): 10 SOLUTION ORAL at 11:37

## 2024-01-26 RX ADMIN — ATORVASTATIN CALCIUM 80 MILLIGRAM(S): 80 TABLET, FILM COATED ORAL at 21:52

## 2024-01-26 RX ADMIN — CHLORHEXIDINE GLUCONATE 1 APPLICATION(S): 213 SOLUTION TOPICAL at 05:34

## 2024-01-26 RX ADMIN — RIVAROXABAN 20 MILLIGRAM(S): KIT at 18:01

## 2024-01-26 RX ADMIN — Medication 0.25 MILLIGRAM(S): at 18:40

## 2024-01-26 RX ADMIN — Medication 1 TABLET(S): at 11:37

## 2024-01-26 RX ADMIN — SENNA PLUS 2 TABLET(S): 8.6 TABLET ORAL at 21:52

## 2024-01-26 RX ADMIN — CLOTRIMAZOLE AND BETAMETHASONE DIPROPIONATE 1 APPLICATION(S): 10; .5 CREAM TOPICAL at 18:40

## 2024-01-26 RX ADMIN — Medication 7: at 09:08

## 2024-01-26 RX ADMIN — Medication 12 UNIT(S): at 13:28

## 2024-01-26 RX ADMIN — Medication 100 MILLIGRAM(S): at 05:32

## 2024-01-26 RX ADMIN — Medication 5: at 18:01

## 2024-01-26 RX ADMIN — INSULIN GLARGINE 40 UNIT(S): 100 INJECTION, SOLUTION SUBCUTANEOUS at 21:52

## 2024-01-26 RX ADMIN — OXYCODONE HYDROCHLORIDE 30 MILLIGRAM(S): 5 TABLET ORAL at 08:01

## 2024-01-26 RX ADMIN — OXYCODONE HYDROCHLORIDE 30 MILLIGRAM(S): 5 TABLET ORAL at 06:11

## 2024-01-26 RX ADMIN — Medication 9: at 13:27

## 2024-01-26 RX ADMIN — CYCLOBENZAPRINE HYDROCHLORIDE 10 MILLIGRAM(S): 10 TABLET, FILM COATED ORAL at 09:08

## 2024-01-26 RX ADMIN — BUDESONIDE AND FORMOTEROL FUMARATE DIHYDRATE 2 PUFF(S): 160; 4.5 AEROSOL RESPIRATORY (INHALATION) at 09:04

## 2024-01-26 NOTE — PROGRESS NOTE ADULT - SUBJECTIVE AND OBJECTIVE BOX
AGUEDA LUIS    [id number]    68y      Female    INTERVAL HPI/OVERNIGHT EVENTS:  patient being seen for covid. patient seen at bedside and deneis any complaints      REVIEW OF SYSTEMS:    CONSTITUTIONAL: No fever, weight loss, or fatigue  RESPIRATORY: No cough, wheezing, hemoptysis; No shortness of breath  CARDIOVASCULAR: No chest pain, palpitations  GASTROINTESTINAL: No abdominal or epigastric pain. No nausea, vomiting  NEUROLOGICAL: No headaches, memory loss, loss of strength.  MISCELLANEOUS:      Vital Signs Last 24 Hrs  T(C): 36.7 (26 Jan 2024 10:11), Max: 36.7 (26 Jan 2024 10:11)  T(F): 98 (26 Jan 2024 10:11), Max: 98 (26 Jan 2024 10:11)  HR: 107 (26 Jan 2024 10:11) (68 - 107)  BP: 110/64 (26 Jan 2024 10:11) (110/64 - 135/72)  BP(mean): --  RR: 18 (26 Jan 2024 10:11) (18 - 18)  SpO2: 95% (26 Jan 2024 10:11) (95% - 99%)    Parameters below as of 26 Jan 2024 10:11  Patient On (Oxygen Delivery Method): nasal cannula  O2 Flow (L/min): 4      PHYSICAL EXAM:    · Constitutional	well-groomed; obese  · Eyes	PERRL; EOMI; conjunctiva clear  · Respiratory	no wheezes; no respiratory distress; respirations non-labored  · Cardiovascular	S1 S2 present; Irregularly irregular rhythm; pedal edema  · Pedal Edema Severity	chronic lymphedema b/l  · Gastrointestinal	soft; nontender; nondistended; normal active bowel sounds  · Neurological	sensation intact; responds to verbal commands  · Skin	warm and dry  · Skin Comments	venous chronic changes on b/l LE; erythema, lymphedema  · Musculoskeletal	no joint swelling; no joint erythema; no joint warmth; no calf tenderness  · Psychiatric	normal affect; alert and oriented x3; normal behavior      LABS:                        13.6   15.02 )-----------( 250      ( 26 Jan 2024 06:51 )             40.9     01-26    134<L>  |  89<L>  |  60.4<H>  ----------------------------<  273<H>  3.7   |  33.0<H>  |  1.35<H>    Ca    9.5      26 Jan 2024 06:51  Phos  3.6     01-24  Mg     1.9     01-26    TPro  6.4<L>  /  Alb  3.6  /  TBili  0.6  /  DBili  x   /  AST  17  /  ALT  22  /  AlkPhos  105  01-26      Urinalysis Basic - ( 26 Jan 2024 06:51 )    Color: x / Appearance: x / SG: x / pH: x  Gluc: 273 mg/dL / Ketone: x  / Bili: x / Urobili: x   Blood: x / Protein: x / Nitrite: x   Leuk Esterase: x / RBC: x / WBC x   Sq Epi: x / Non Sq Epi: x / Bacteria: x          MEDICATIONS  (STANDING):  atorvastatin 80 milliGRAM(s) Oral at bedtime  budesonide  80 MICROgram(s)/formoterol 4.5 MICROgram(s) Inhaler 2 Puff(s) Inhalation two times a day  buMETAnide 2 milliGRAM(s) Oral daily  chlorhexidine 2% Cloths 1 Application(s) Topical <User Schedule>  clotrimazole/betamethasone Cream 1 Application(s) Topical two times a day  dextrose 5%. 1000 milliLiter(s) (100 mL/Hr) IV Continuous <Continuous>  dextrose 5%. 1000 milliLiter(s) (50 mL/Hr) IV Continuous <Continuous>  dextrose 50% Injectable 12.5 Gram(s) IV Push once  dextrose 50% Injectable 25 Gram(s) IV Push once  dextrose 50% Injectable 25 Gram(s) IV Push once  famotidine    Tablet 20 milliGRAM(s) Oral daily  glucagon  Injectable 1 milliGRAM(s) IntraMuscular once  insulin glargine Injectable (LANTUS) 45 Unit(s) SubCutaneous every morning  insulin glargine Injectable (LANTUS) 40 Unit(s) SubCutaneous at bedtime  insulin lispro (ADMELOG) corrective regimen sliding scale   SubCutaneous three times a day before meals  insulin lispro Injectable (ADMELOG) 12 Unit(s) SubCutaneous three times a day before meals  lactulose Syrup 20 Gram(s) Oral daily  lidocaine   4% Patch 1 Patch Transdermal daily  metoprolol succinate  milliGRAM(s) Oral daily  multivitamin 1 Tablet(s) Oral daily  oxyCODONE  ER Tablet 30 milliGRAM(s) Oral every 12 hours  rivaroxaban 20 milliGRAM(s) Oral with dinner  senna 2 Tablet(s) Oral at bedtime    MEDICATIONS  (PRN):  acetaminophen     Tablet .. 650 milliGRAM(s) Oral every 6 hours PRN Temp greater or equal to 38C (100.4F), Mild Pain (1 - 3)  albuterol    90 MICROgram(s) HFA Inhaler 2 Puff(s) Inhalation every 6 hours PRN Bronchospasm  ALPRAZolam 0.25 milliGRAM(s) Oral every 8 hours PRN for anxiety/Sleep  aluminum hydroxide/magnesium hydroxide/simethicone Suspension 30 milliLiter(s) Oral every 4 hours PRN Dyspepsia  cyclobenzaprine 10 milliGRAM(s) Oral three times a day PRN Muscle Spasm  dextrose Oral Gel 15 Gram(s) Oral once PRN Blood Glucose LESS THAN 70 milliGRAM(s)/deciliter  melatonin 3 milliGRAM(s) Oral at bedtime PRN Insomnia  metoprolol tartrate Injectable 5 milliGRAM(s) IV Push every 6 hours PRN for heart rate above 110 bpm  ondansetron Injectable 4 milliGRAM(s) IV Push every 8 hours PRN Nausea and/or Vomiting  oxyCODONE    IR 5 milliGRAM(s) Oral every 6 hours PRN Moderate Pain (4 - 6)  oxyCODONE    IR 10 milliGRAM(s) Oral every 6 hours PRN Severe Pain (7 - 10)      RADIOLOGY & ADDITIONAL TESTS:

## 2024-01-26 NOTE — PROGRESS NOTE ADULT - ASSESSMENT
69 y/o female with PMH of HTN, HLD, DM2, HFpEF, CKD3, DVT, Uterine CA, afib on Xarelto, COPD on home O2 4L, chronic lymphedema sent from [facility] to the ED for  chest pain. Patient reported pressure pain located on the left side, non-radiating, associated with shortness of breath. In the ED, VBG done, respiratory acidosis, BiPAP offered but patient reportedly declined. Lasix given for possible CHF ex; cardiology consulted.     Shortness of breath  Multifactorial etiology: COPD, CHFpEF and morbid obesity   bumex po   Patient on oxygen 4L (home dose)    completed remdesivir     afib-  xarelto   toprol    CKD-3   Patient on diuretic chronically   Monitor renal function   cr improved    HTN/HLD   Atorvastatin 80mg   Lopressor    DM-2   Lantus bid and ssi     Hx of spinal stenosis  Lidocaine patch daily     dc to [facility] pending auth

## 2024-01-27 LAB
GLUCOSE BLDC GLUCOMTR-MCNC: 124 MG/DL — HIGH (ref 70–99)
GLUCOSE BLDC GLUCOMTR-MCNC: 146 MG/DL — HIGH (ref 70–99)
GLUCOSE BLDC GLUCOMTR-MCNC: 160 MG/DL — HIGH (ref 70–99)
GLUCOSE BLDC GLUCOMTR-MCNC: 209 MG/DL — HIGH (ref 70–99)

## 2024-01-27 PROCEDURE — 99232 SBSQ HOSP IP/OBS MODERATE 35: CPT

## 2024-01-27 RX ADMIN — Medication 100 MILLIGRAM(S): at 05:58

## 2024-01-27 RX ADMIN — CHLORHEXIDINE GLUCONATE 1 APPLICATION(S): 213 SOLUTION TOPICAL at 05:58

## 2024-01-27 RX ADMIN — OXYCODONE HYDROCHLORIDE 30 MILLIGRAM(S): 5 TABLET ORAL at 06:53

## 2024-01-27 RX ADMIN — LACTULOSE 20 GRAM(S): 10 SOLUTION ORAL at 13:34

## 2024-01-27 RX ADMIN — OXYCODONE HYDROCHLORIDE 10 MILLIGRAM(S): 5 TABLET ORAL at 09:44

## 2024-01-27 RX ADMIN — SENNA PLUS 2 TABLET(S): 8.6 TABLET ORAL at 22:38

## 2024-01-27 RX ADMIN — CLOTRIMAZOLE AND BETAMETHASONE DIPROPIONATE 1 APPLICATION(S): 10; .5 CREAM TOPICAL at 19:03

## 2024-01-27 RX ADMIN — ATORVASTATIN CALCIUM 80 MILLIGRAM(S): 80 TABLET, FILM COATED ORAL at 22:38

## 2024-01-27 RX ADMIN — LIDOCAINE 1 PATCH: 4 CREAM TOPICAL at 02:00

## 2024-01-27 RX ADMIN — OXYCODONE HYDROCHLORIDE 30 MILLIGRAM(S): 5 TABLET ORAL at 19:45

## 2024-01-27 RX ADMIN — INSULIN GLARGINE 45 UNIT(S): 100 INJECTION, SOLUTION SUBCUTANEOUS at 09:44

## 2024-01-27 RX ADMIN — FAMOTIDINE 20 MILLIGRAM(S): 10 INJECTION INTRAVENOUS at 13:33

## 2024-01-27 RX ADMIN — INSULIN GLARGINE 40 UNIT(S): 100 INJECTION, SOLUTION SUBCUTANEOUS at 22:38

## 2024-01-27 RX ADMIN — OXYCODONE HYDROCHLORIDE 30 MILLIGRAM(S): 5 TABLET ORAL at 05:58

## 2024-01-27 RX ADMIN — OXYCODONE HYDROCHLORIDE 30 MILLIGRAM(S): 5 TABLET ORAL at 18:58

## 2024-01-27 RX ADMIN — Medication 0.25 MILLIGRAM(S): at 06:03

## 2024-01-27 RX ADMIN — CLOTRIMAZOLE AND BETAMETHASONE DIPROPIONATE 1 APPLICATION(S): 10; .5 CREAM TOPICAL at 06:00

## 2024-01-27 RX ADMIN — CYCLOBENZAPRINE HYDROCHLORIDE 10 MILLIGRAM(S): 10 TABLET, FILM COATED ORAL at 19:02

## 2024-01-27 RX ADMIN — Medication 12 UNIT(S): at 13:24

## 2024-01-27 RX ADMIN — Medication 12 UNIT(S): at 09:46

## 2024-01-27 RX ADMIN — Medication 5: at 13:26

## 2024-01-27 RX ADMIN — LIDOCAINE 1 PATCH: 4 CREAM TOPICAL at 13:34

## 2024-01-27 RX ADMIN — Medication 12 UNIT(S): at 18:57

## 2024-01-27 RX ADMIN — Medication 1 TABLET(S): at 13:33

## 2024-01-27 RX ADMIN — RIVAROXABAN 20 MILLIGRAM(S): KIT at 18:58

## 2024-01-27 RX ADMIN — BUDESONIDE AND FORMOTEROL FUMARATE DIHYDRATE 2 PUFF(S): 160; 4.5 AEROSOL RESPIRATORY (INHALATION) at 09:48

## 2024-01-27 RX ADMIN — OXYCODONE HYDROCHLORIDE 10 MILLIGRAM(S): 5 TABLET ORAL at 10:44

## 2024-01-27 RX ADMIN — BUDESONIDE AND FORMOTEROL FUMARATE DIHYDRATE 2 PUFF(S): 160; 4.5 AEROSOL RESPIRATORY (INHALATION) at 22:38

## 2024-01-27 RX ADMIN — LIDOCAINE 1 PATCH: 4 CREAM TOPICAL at 20:05

## 2024-01-27 RX ADMIN — BUMETANIDE 2 MILLIGRAM(S): 0.25 INJECTION INTRAMUSCULAR; INTRAVENOUS at 05:58

## 2024-01-27 RX ADMIN — Medication 3: at 18:57

## 2024-01-27 NOTE — PROGRESS NOTE ADULT - SUBJECTIVE AND OBJECTIVE BOX
Follow up for SOB / COVID     chart reviewed   pt is seen examined   she is awake alert , feels little better   concern about readmission     Vital Signs Last 24 Hrs  T(C): 37 (27 Jan 2024 11:48), Max: 37.1 (26 Jan 2024 16:00)  T(F): 98.6 (27 Jan 2024 11:48), Max: 98.7 (26 Jan 2024 16:00)  HR: 72 (27 Jan 2024 11:48) (72 - 97)  BP: 92/60 (27 Jan 2024 11:48) (92/60 - 131/79)  BP(mean): --  RR: 18 (27 Jan 2024 11:48) (18 - 18)  SpO2: 95% (27 Jan 2024 11:48) (95% - 99%)    Parameters below as of 27 Jan 2024 11:48  Patient On (Oxygen Delivery Method): nasal cannula    PHYSICAL EXAM:    general : awake alert   Neck Supple , no JVD   Lungs : CTA bilateral   Heart : regular s1 . s2  Abd soft no tenderness , Bs +   Ext : + pretibial and foot edema       LABS:                        13.6   15.02 )-----------( 250      ( 26 Jan 2024 06:51 )             40.9     01-26    134<L>  |  89<L>  |  60.4<H>  ----------------------------<  273<H>  3.7   |  33.0<H>  |  1.35<H>    Ca    9.5      26 Jan 2024 06:51  Phos  3.6     01-24  Mg     1.9     01-26    TPro  6.4<L>  /  Alb  3.6  /  TBili  0.6  /  DBili  x   /  AST  17  /  ALT  22  /  AlkPhos  105  01-26

## 2024-01-27 NOTE — PROGRESS NOTE ADULT - ASSESSMENT
69 y/o female with PMH of HTN, HLD, DM2, HFpEF, CKD3, DVT, Uterine CA, afib on Xarelto, COPD on home O2 4L, chronic lymphedema sent from [facility] to the ED for  chest pain. Patient reported pressure pain located on the left side, non-radiating, associated with shortness of breath. In the ED, VBG done, respiratory acidosis, BiPAP offered but patient reportedly declined. Lasix given for possible CHF ex; cardiology consulted.     1-Shortness of breath  Multifactorial etiology: COPD, CHFpEF and morbid obesity   cont bumex po   Patient on oxygen 4L (home dose)    completed remdesivir     2-afib-  xarelto   toprol    3-CKD-3   Patient on diuretic chronically   Monitor renal function   cr improved    4-HTN/HLD   Atorvastatin 80mg   Lopressor    5-DM-2   Lantus bid and ssi     Hx of spinal stenosis  Lidocaine patch daily     dc to [facility] pending authorization

## 2024-01-28 LAB
GLUCOSE BLDC GLUCOMTR-MCNC: 139 MG/DL — HIGH (ref 70–99)
GLUCOSE BLDC GLUCOMTR-MCNC: 140 MG/DL — HIGH (ref 70–99)
GLUCOSE BLDC GLUCOMTR-MCNC: 150 MG/DL — HIGH (ref 70–99)
GLUCOSE BLDC GLUCOMTR-MCNC: 211 MG/DL — HIGH (ref 70–99)

## 2024-01-28 PROCEDURE — 99232 SBSQ HOSP IP/OBS MODERATE 35: CPT

## 2024-01-28 RX ORDER — OXYCODONE HYDROCHLORIDE 5 MG/1
5 TABLET ORAL EVERY 4 HOURS
Refills: 0 | Status: DISCONTINUED | OUTPATIENT
Start: 2024-01-28 | End: 2024-02-01

## 2024-01-28 RX ORDER — ALPRAZOLAM 0.25 MG
0.25 TABLET ORAL THREE TIMES A DAY
Refills: 0 | Status: DISCONTINUED | OUTPATIENT
Start: 2024-01-28 | End: 2024-02-01

## 2024-01-28 RX ADMIN — SENNA PLUS 2 TABLET(S): 8.6 TABLET ORAL at 22:38

## 2024-01-28 RX ADMIN — CHLORHEXIDINE GLUCONATE 1 APPLICATION(S): 213 SOLUTION TOPICAL at 06:06

## 2024-01-28 RX ADMIN — OXYCODONE HYDROCHLORIDE 10 MILLIGRAM(S): 5 TABLET ORAL at 10:53

## 2024-01-28 RX ADMIN — Medication 100 MILLIGRAM(S): at 06:06

## 2024-01-28 RX ADMIN — OXYCODONE HYDROCHLORIDE 10 MILLIGRAM(S): 5 TABLET ORAL at 23:02

## 2024-01-28 RX ADMIN — LACTULOSE 20 GRAM(S): 10 SOLUTION ORAL at 14:12

## 2024-01-28 RX ADMIN — BUDESONIDE AND FORMOTEROL FUMARATE DIHYDRATE 2 PUFF(S): 160; 4.5 AEROSOL RESPIRATORY (INHALATION) at 09:16

## 2024-01-28 RX ADMIN — FAMOTIDINE 20 MILLIGRAM(S): 10 INJECTION INTRAVENOUS at 14:13

## 2024-01-28 RX ADMIN — INSULIN GLARGINE 45 UNIT(S): 100 INJECTION, SOLUTION SUBCUTANEOUS at 09:03

## 2024-01-28 RX ADMIN — Medication 12 UNIT(S): at 14:11

## 2024-01-28 RX ADMIN — CLOTRIMAZOLE AND BETAMETHASONE DIPROPIONATE 1 APPLICATION(S): 10; .5 CREAM TOPICAL at 18:49

## 2024-01-28 RX ADMIN — OXYCODONE HYDROCHLORIDE 10 MILLIGRAM(S): 5 TABLET ORAL at 09:53

## 2024-01-28 RX ADMIN — Medication 12 UNIT(S): at 09:04

## 2024-01-28 RX ADMIN — OXYCODONE HYDROCHLORIDE 30 MILLIGRAM(S): 5 TABLET ORAL at 06:33

## 2024-01-28 RX ADMIN — Medication 5: at 12:59

## 2024-01-28 RX ADMIN — OXYCODONE HYDROCHLORIDE 30 MILLIGRAM(S): 5 TABLET ORAL at 18:31

## 2024-01-28 RX ADMIN — Medication 12 UNIT(S): at 18:32

## 2024-01-28 RX ADMIN — INSULIN GLARGINE 40 UNIT(S): 100 INJECTION, SOLUTION SUBCUTANEOUS at 22:38

## 2024-01-28 RX ADMIN — RIVAROXABAN 20 MILLIGRAM(S): KIT at 18:31

## 2024-01-28 RX ADMIN — BUDESONIDE AND FORMOTEROL FUMARATE DIHYDRATE 2 PUFF(S): 160; 4.5 AEROSOL RESPIRATORY (INHALATION) at 22:33

## 2024-01-28 RX ADMIN — ATORVASTATIN CALCIUM 80 MILLIGRAM(S): 80 TABLET, FILM COATED ORAL at 22:38

## 2024-01-28 RX ADMIN — BUMETANIDE 2 MILLIGRAM(S): 0.25 INJECTION INTRAMUSCULAR; INTRAVENOUS at 06:05

## 2024-01-28 RX ADMIN — CLOTRIMAZOLE AND BETAMETHASONE DIPROPIONATE 1 APPLICATION(S): 10; .5 CREAM TOPICAL at 06:05

## 2024-01-28 RX ADMIN — Medication 1 TABLET(S): at 14:13

## 2024-01-28 RX ADMIN — CYCLOBENZAPRINE HYDROCHLORIDE 10 MILLIGRAM(S): 10 TABLET, FILM COATED ORAL at 18:48

## 2024-01-28 RX ADMIN — OXYCODONE HYDROCHLORIDE 30 MILLIGRAM(S): 5 TABLET ORAL at 06:05

## 2024-01-28 RX ADMIN — Medication 0.25 MILLIGRAM(S): at 09:53

## 2024-01-28 NOTE — PROGRESS NOTE ADULT - ASSESSMENT
67 y/o female with PMH of HTN, HLD, DM2, HFpEF, CKD3, DVT, Uterine CA, afib on Xarelto, COPD on home O2 4L, chronic lymphedema sent from [facility] to the ED for  chest pain. Patient reported pressure pain located on the left side, non-radiating, associated with shortness of breath. In the ED, VBG done, respiratory acidosis, BiPAP offered but patient reportedly declined. Lasix given for possible CHF ex; cardiology consulted.     1-Shortness of breath, multifactorial   COPD, CHFpEF and morbid obesity   cont bumex po   Patient on oxygen 4L (home dose)    completed remdesivir     2-afib  xarelto   toprol rate controlled     3-CKD-3   Patient on diuretic chronically   Monitor renal function   repeat BMP in am     4-HTN/HLD   Atorvastatin 80mg   Lopressor daily     5- Cronic pain . h/o spinal stenosis   on oxycodone ER and IR   cont muscle relaxant   xanax for anxiety       6-DM-2   Lantus bid and ssi       dc to [facility] pending authorization

## 2024-01-28 NOTE — PROGRESS NOTE ADULT - SUBJECTIVE AND OBJECTIVE BOX
Follow up for SOB / COVID infection   cronic hypoxia     Pt is seen eralier today . she is stting in the bed , no overnight events   cronic pain reports not well controlled at times     will adjust pain meds   resume xanax or anxiety     MEDICATIONS  (STANDING):  atorvastatin 80 milliGRAM(s) Oral at bedtime  budesonide  80 MICROgram(s)/formoterol 4.5 MICROgram(s) Inhaler 2 Puff(s) Inhalation two times a day  buMETAnide 2 milliGRAM(s) Oral daily  chlorhexidine 2% Cloths 1 Application(s) Topical <User Schedule>  clotrimazole/betamethasone Cream 1 Application(s) Topical two times a day  dextrose 5%. 1000 milliLiter(s) (50 mL/Hr) IV Continuous <Continuous>  dextrose 5%. 1000 milliLiter(s) (100 mL/Hr) IV Continuous <Continuous>  dextrose 50% Injectable 25 Gram(s) IV Push once  dextrose 50% Injectable 12.5 Gram(s) IV Push once  dextrose 50% Injectable 25 Gram(s) IV Push once  famotidine    Tablet 20 milliGRAM(s) Oral daily  glucagon  Injectable 1 milliGRAM(s) IntraMuscular once  insulin glargine Injectable (LANTUS) 45 Unit(s) SubCutaneous every morning  insulin glargine Injectable (LANTUS) 40 Unit(s) SubCutaneous at bedtime  insulin lispro (ADMELOG) corrective regimen sliding scale   SubCutaneous three times a day before meals  insulin lispro Injectable (ADMELOG) 12 Unit(s) SubCutaneous three times a day before meals  lactulose Syrup 20 Gram(s) Oral daily  lidocaine   4% Patch 1 Patch Transdermal daily  metoprolol succinate  milliGRAM(s) Oral daily  multivitamin 1 Tablet(s) Oral daily  oxyCODONE  ER Tablet 30 milliGRAM(s) Oral every 12 hours  rivaroxaban 20 milliGRAM(s) Oral with dinner  senna 2 Tablet(s) Oral at bedtime      Vital Signs Last 24 Hrs  T(C): 36.4 (28 Jan 2024 04:35), Max: 37.1 (27 Jan 2024 20:45)  T(F): 97.5 (28 Jan 2024 04:35), Max: 98.8 (27 Jan 2024 20:45)  HR: 76 (28 Jan 2024 04:35) (69 - 76)  BP: 107/66 (28 Jan 2024 04:35) (92/60 - 122/55)  BP(mean): --  RR: 19 (28 Jan 2024 04:35) (18 - 20)  SpO2: 98% (28 Jan 2024 04:35) (94% - 98%)    Parameters below as of 28 Jan 2024 04:35  Patient On (Oxygen Delivery Method): nasal cannula  O2 Flow (L/min): 4        Vital Signs Last 24 Hrs  T(C): 36.4 (28 Jan 2024 04:35), Max: 37.1 (27 Jan 2024 20:45)  T(F): 97.5 (28 Jan 2024 04:35), Max: 98.8 (27 Jan 2024 20:45)  HR: 76 (28 Jan 2024 04:35) (69 - 76)  BP: 107/66 (28 Jan 2024 04:35) (92/60 - 122/55)  BP(mean): --  RR: 19 (28 Jan 2024 04:35) (18 - 20)  SpO2: 98% (28 Jan 2024 04:35) (94% - 98%)    Parameters below as of 28 Jan 2024 04:35  Patient On (Oxygen Delivery Method): nasal cannula  O2 Flow (L/min): 4      general : awake alert   Neck Supple , no JVD   Lungs : CTA bilateral , rare rhonchi   Heart : regular s1 . s2  Abd soft no tenderness , Bs +   Ext : + pretibial swelling ,cronic venous stasis    foot ankle  edema

## 2024-01-29 LAB
ANION GAP SERPL CALC-SCNC: 14 MMOL/L — SIGNIFICANT CHANGE UP (ref 5–17)
BUN SERPL-MCNC: 56.5 MG/DL — HIGH (ref 8–20)
CALCIUM SERPL-MCNC: 9.6 MG/DL — SIGNIFICANT CHANGE UP (ref 8.4–10.5)
CHLORIDE SERPL-SCNC: 90 MMOL/L — LOW (ref 96–108)
CO2 SERPL-SCNC: 34 MMOL/L — HIGH (ref 22–29)
CREAT SERPL-MCNC: 1.61 MG/DL — HIGH (ref 0.5–1.3)
EGFR: 34 ML/MIN/1.73M2 — LOW
GLUCOSE BLDC GLUCOMTR-MCNC: 178 MG/DL — HIGH (ref 70–99)
GLUCOSE BLDC GLUCOMTR-MCNC: 192 MG/DL — HIGH (ref 70–99)
GLUCOSE BLDC GLUCOMTR-MCNC: 194 MG/DL — HIGH (ref 70–99)
GLUCOSE BLDC GLUCOMTR-MCNC: 196 MG/DL — HIGH (ref 70–99)
GLUCOSE SERPL-MCNC: 205 MG/DL — HIGH (ref 70–99)
HCT VFR BLD CALC: 48 % — HIGH (ref 34.5–45)
HGB BLD-MCNC: 14.9 G/DL — SIGNIFICANT CHANGE UP (ref 11.5–15.5)
MCHC RBC-ENTMCNC: 28.7 PG — SIGNIFICANT CHANGE UP (ref 27–34)
MCHC RBC-ENTMCNC: 31 GM/DL — LOW (ref 32–36)
MCV RBC AUTO: 92.5 FL — SIGNIFICANT CHANGE UP (ref 80–100)
PLATELET # BLD AUTO: 183 K/UL — SIGNIFICANT CHANGE UP (ref 150–400)
POTASSIUM SERPL-MCNC: 3.8 MMOL/L — SIGNIFICANT CHANGE UP (ref 3.5–5.3)
POTASSIUM SERPL-SCNC: 3.8 MMOL/L — SIGNIFICANT CHANGE UP (ref 3.5–5.3)
RBC # BLD: 5.19 M/UL — SIGNIFICANT CHANGE UP (ref 3.8–5.2)
RBC # FLD: 14.2 % — SIGNIFICANT CHANGE UP (ref 10.3–14.5)
SODIUM SERPL-SCNC: 137 MMOL/L — SIGNIFICANT CHANGE UP (ref 135–145)
WBC # BLD: 11.89 K/UL — HIGH (ref 3.8–10.5)
WBC # FLD AUTO: 11.89 K/UL — HIGH (ref 3.8–10.5)

## 2024-01-29 PROCEDURE — 99232 SBSQ HOSP IP/OBS MODERATE 35: CPT

## 2024-01-29 RX ADMIN — FAMOTIDINE 20 MILLIGRAM(S): 10 INJECTION INTRAVENOUS at 13:38

## 2024-01-29 RX ADMIN — CYCLOBENZAPRINE HYDROCHLORIDE 10 MILLIGRAM(S): 10 TABLET, FILM COATED ORAL at 13:39

## 2024-01-29 RX ADMIN — Medication 3: at 18:12

## 2024-01-29 RX ADMIN — Medication 3: at 12:19

## 2024-01-29 RX ADMIN — Medication 3: at 08:44

## 2024-01-29 RX ADMIN — Medication 0.25 MILLIGRAM(S): at 07:07

## 2024-01-29 RX ADMIN — LACTULOSE 20 GRAM(S): 10 SOLUTION ORAL at 13:38

## 2024-01-29 RX ADMIN — BUMETANIDE 2 MILLIGRAM(S): 0.25 INJECTION INTRAMUSCULAR; INTRAVENOUS at 06:42

## 2024-01-29 RX ADMIN — INSULIN GLARGINE 40 UNIT(S): 100 INJECTION, SOLUTION SUBCUTANEOUS at 22:16

## 2024-01-29 RX ADMIN — BUDESONIDE AND FORMOTEROL FUMARATE DIHYDRATE 2 PUFF(S): 160; 4.5 AEROSOL RESPIRATORY (INHALATION) at 22:16

## 2024-01-29 RX ADMIN — OXYCODONE HYDROCHLORIDE 30 MILLIGRAM(S): 5 TABLET ORAL at 06:42

## 2024-01-29 RX ADMIN — Medication 12 UNIT(S): at 12:19

## 2024-01-29 RX ADMIN — Medication 12 UNIT(S): at 18:12

## 2024-01-29 RX ADMIN — Medication 100 MILLIGRAM(S): at 06:45

## 2024-01-29 RX ADMIN — Medication 12 UNIT(S): at 08:44

## 2024-01-29 RX ADMIN — CHLORHEXIDINE GLUCONATE 1 APPLICATION(S): 213 SOLUTION TOPICAL at 07:07

## 2024-01-29 RX ADMIN — CLOTRIMAZOLE AND BETAMETHASONE DIPROPIONATE 1 APPLICATION(S): 10; .5 CREAM TOPICAL at 18:11

## 2024-01-29 RX ADMIN — RIVAROXABAN 20 MILLIGRAM(S): KIT at 18:12

## 2024-01-29 RX ADMIN — BUDESONIDE AND FORMOTEROL FUMARATE DIHYDRATE 2 PUFF(S): 160; 4.5 AEROSOL RESPIRATORY (INHALATION) at 10:11

## 2024-01-29 RX ADMIN — ATORVASTATIN CALCIUM 80 MILLIGRAM(S): 80 TABLET, FILM COATED ORAL at 22:14

## 2024-01-29 RX ADMIN — OXYCODONE HYDROCHLORIDE 30 MILLIGRAM(S): 5 TABLET ORAL at 18:41

## 2024-01-29 RX ADMIN — OXYCODONE HYDROCHLORIDE 30 MILLIGRAM(S): 5 TABLET ORAL at 18:15

## 2024-01-29 RX ADMIN — Medication 1 TABLET(S): at 13:38

## 2024-01-29 RX ADMIN — SENNA PLUS 2 TABLET(S): 8.6 TABLET ORAL at 22:13

## 2024-01-29 RX ADMIN — OXYCODONE HYDROCHLORIDE 30 MILLIGRAM(S): 5 TABLET ORAL at 08:52

## 2024-01-29 RX ADMIN — INSULIN GLARGINE 45 UNIT(S): 100 INJECTION, SOLUTION SUBCUTANEOUS at 08:45

## 2024-01-29 NOTE — PROGRESS NOTE ADULT - SUBJECTIVE AND OBJECTIVE BOX
Follow up for SOB / COVID infection   cronic hypoxia     Pt is seen in am   she is feeling well stable   still not decided where to go for [facility]   requesting to call her union and ask     SW is on board DC planing to [facility]     CSVital Signs Last 24 Hrs  T(C): 36.4 (28 Jan 2024 04:35), Max: 37.1 (27 Jan 2024 20:45)  T(F): 97.5 (28 Jan 2024 04:35), Max: 98.8 (27 Jan 2024 20:45)  HR: 76 (28 Jan 2024 04:35) (69 - 76)  BP: 107/66 (28 Jan 2024 04:35) (92/60 - 122/55)  BP(mean): --  RR: 19 (28 Jan 2024 04:35) (18 - 20)  SpO2: 98% (28 Jan 2024 04:35) (94% - 98%)    Parameters below as of 28 Jan 2024 04:35  Patient On (Oxygen Delivery Method): nasal cannula  O2 Flow (L/min): 4      general : awake alert   Neck Supple , no JVD   Lungs : CTA bilateral , rare rhonchi   Heart : regular s1 . s2  Abd soft no tenderness , Bs +   Ext : + pretibial swelling ,cronic venous stasis    foot ankle  edema

## 2024-01-29 NOTE — PROGRESS NOTE ADULT - ASSESSMENT
67 y/o female with PMH of HTN, HLD, DM2, HFpEF, CKD3, DVT, Uterine CA, afib on Xarelto, COPD on home O2 4L, chronic lymphedema sent from [facility] to the ED for  chest pain. Patient reported pressure pain located on the left side, non-radiating, associated with shortness of breath. In the ED, VBG done, respiratory acidosis, BiPAP offered but patient reportedly declined. Lasix given for possible CHF ex; cardiology consulted.     1-Shortness of breath, multifactorial   COPD, CHFpEF and morbid obesity   cont bumex po   Patient on oxygen 4L (home dose)    completed remdesivir     2-afib  xarelto   toprol  rate controlled     3-CKD-3   Patient on diuretic chronically   Monitor renal function   repeat BMP in am     4-HTN/HLD   Atorvastatin 80mg   Lopressor daily     5- Cronic pain . h/o spinal stenosis   on oxycodone ER and IR   cont muscle relaxant   xanax for anxiety       6-DM-2   Lantus bid and ssi       dc to [facility] pending authorization

## 2024-01-30 ENCOUNTER — APPOINTMENT (OUTPATIENT)
Dept: PULMONOLOGY | Facility: CLINIC | Age: 69
End: 2024-01-30

## 2024-01-30 ENCOUNTER — TRANSCRIPTION ENCOUNTER (OUTPATIENT)
Age: 69
End: 2024-01-30

## 2024-01-30 LAB
GLUCOSE BLDC GLUCOMTR-MCNC: 138 MG/DL — HIGH (ref 70–99)
GLUCOSE BLDC GLUCOMTR-MCNC: 217 MG/DL — HIGH (ref 70–99)
GLUCOSE BLDC GLUCOMTR-MCNC: 225 MG/DL — HIGH (ref 70–99)
GLUCOSE BLDC GLUCOMTR-MCNC: 277 MG/DL — HIGH (ref 70–99)

## 2024-01-30 PROCEDURE — 99233 SBSQ HOSP IP/OBS HIGH 50: CPT

## 2024-01-30 RX ORDER — BUMETANIDE 0.25 MG/ML
1 INJECTION INTRAMUSCULAR; INTRAVENOUS ONCE
Refills: 0 | Status: COMPLETED | OUTPATIENT
Start: 2024-01-30 | End: 2024-01-30

## 2024-01-30 RX ADMIN — OXYCODONE HYDROCHLORIDE 30 MILLIGRAM(S): 5 TABLET ORAL at 06:21

## 2024-01-30 RX ADMIN — Medication 100 MILLIGRAM(S): at 06:21

## 2024-01-30 RX ADMIN — INSULIN GLARGINE 45 UNIT(S): 100 INJECTION, SOLUTION SUBCUTANEOUS at 08:30

## 2024-01-30 RX ADMIN — OXYCODONE HYDROCHLORIDE 30 MILLIGRAM(S): 5 TABLET ORAL at 18:55

## 2024-01-30 RX ADMIN — Medication 12 UNIT(S): at 12:30

## 2024-01-30 RX ADMIN — SENNA PLUS 2 TABLET(S): 8.6 TABLET ORAL at 21:36

## 2024-01-30 RX ADMIN — Medication 12 UNIT(S): at 18:23

## 2024-01-30 RX ADMIN — Medication 1 TABLET(S): at 14:31

## 2024-01-30 RX ADMIN — CHLORHEXIDINE GLUCONATE 1 APPLICATION(S): 213 SOLUTION TOPICAL at 06:21

## 2024-01-30 RX ADMIN — CLOTRIMAZOLE AND BETAMETHASONE DIPROPIONATE 1 APPLICATION(S): 10; .5 CREAM TOPICAL at 18:27

## 2024-01-30 RX ADMIN — ATORVASTATIN CALCIUM 80 MILLIGRAM(S): 80 TABLET, FILM COATED ORAL at 21:36

## 2024-01-30 RX ADMIN — OXYCODONE HYDROCHLORIDE 5 MILLIGRAM(S): 5 TABLET ORAL at 14:32

## 2024-01-30 RX ADMIN — Medication 12 UNIT(S): at 08:29

## 2024-01-30 RX ADMIN — CLOTRIMAZOLE AND BETAMETHASONE DIPROPIONATE 1 APPLICATION(S): 10; .5 CREAM TOPICAL at 04:58

## 2024-01-30 RX ADMIN — OXYCODONE HYDROCHLORIDE 30 MILLIGRAM(S): 5 TABLET ORAL at 07:36

## 2024-01-30 RX ADMIN — Medication 0.25 MILLIGRAM(S): at 06:36

## 2024-01-30 RX ADMIN — FAMOTIDINE 20 MILLIGRAM(S): 10 INJECTION INTRAVENOUS at 14:32

## 2024-01-30 RX ADMIN — BUMETANIDE 2 MILLIGRAM(S): 0.25 INJECTION INTRAMUSCULAR; INTRAVENOUS at 06:21

## 2024-01-30 RX ADMIN — RIVAROXABAN 20 MILLIGRAM(S): KIT at 18:22

## 2024-01-30 RX ADMIN — Medication 5: at 12:30

## 2024-01-30 RX ADMIN — LACTULOSE 20 GRAM(S): 10 SOLUTION ORAL at 14:32

## 2024-01-30 RX ADMIN — BUDESONIDE AND FORMOTEROL FUMARATE DIHYDRATE 2 PUFF(S): 160; 4.5 AEROSOL RESPIRATORY (INHALATION) at 08:29

## 2024-01-30 RX ADMIN — OXYCODONE HYDROCHLORIDE 30 MILLIGRAM(S): 5 TABLET ORAL at 18:22

## 2024-01-30 RX ADMIN — INSULIN GLARGINE 40 UNIT(S): 100 INJECTION, SOLUTION SUBCUTANEOUS at 21:36

## 2024-01-30 RX ADMIN — Medication 5: at 18:23

## 2024-01-30 RX ADMIN — OXYCODONE HYDROCHLORIDE 5 MILLIGRAM(S): 5 TABLET ORAL at 15:48

## 2024-01-30 RX ADMIN — BUMETANIDE 1 MILLIGRAM(S): 0.25 INJECTION INTRAMUSCULAR; INTRAVENOUS at 18:22

## 2024-01-30 NOTE — DISCHARGE NOTE PROVIDER - ATTENDING DISCHARGE PHYSICAL EXAMINATION:
Pt is seen in am stable respiratory status   c/o constipation , meds reviewed   d/w nurse   overnight BP is soft   meds adjusted   pt denies feeling dizziness , no CP     Vital Signs Last 24 Hrs  T(C): 36.4 (01 Feb 2024 09:27), Max: 36.7 (31 Jan 2024 20:00)  T(F): 97.6 (01 Feb 2024 09:27), Max: 98.1 (31 Jan 2024 20:00)  HR: 122 (01 Feb 2024 09:27) (65 - 122)  BP: 101/61 (01 Feb 2024 09:27) (94/50 - 106/64)  BP(mean): --  RR: 18 (01 Feb 2024 09:27) (18 - 18)  SpO2: 100% (01 Feb 2024 09:27) (94% - 100%)    Parameters below as of 01 Feb 2024 09:27  Patient On (Oxygen Delivery Method): nasal cannula  O2 Flow (L/min): 4  Lungs : diminished BS   Heart :regulr s1 s2   abd soft no tenderness , BS +   ext diffuse leg edema , feet swollen , skin tick status changes

## 2024-01-30 NOTE — PROGRESS NOTE ADULT - ASSESSMENT
69 y/o female with PMH of HTN, HLD, DM2, HFpEF, CKD3, DVT, Uterine CA, afib on Xarelto, COPD on home O2 4L, chronic lymphedema sent from [facility] to the ED for  chest pain. Patient reported pressure pain located on the left side, non-radiating, associated with shortness of breath. In the ED, VBG done, respiratory acidosis, BiPAP offered but patient reportedly declined. Lasix given for possible CHF ex; cardiology consulted.     1-Shortness of breath, multifactorial in setting of COVID infection   COPD, CHFpEF and morbid obesity   cont bumex po , oxygen   Patient on oxygen 4L (home dose)    completed remdesivir     2-afib  xarelto   toprol  stable     3-CKD-3   Patient on diuretic chronically   cr 1.65    4-HTN/HLD   Atorvastatin 80mg   Lopressor daily     5- Cronic pain . h/o spinal stenosis   on oxycodone ER and IR   cont muscle relaxant   xanax for anxiety as needed       6-DM-2   Lantus bid and ssi   Bg is controlled     dc to [facility] pending authorization

## 2024-01-30 NOTE — DISCHARGE NOTE PROVIDER - CARE PROVIDERS DIRECT ADDRESSES
[email or web address],DirectAddress_Unknown

## 2024-01-30 NOTE — DISCHARGE NOTE PROVIDER - CARE PROVIDER_API CALL
Sari Long  Internal Medicine  [address], [address]  [place] [address]  Phone: [phone]  Fax: [phone]  Follow Up Time: 1-3 days    Forest Bolton  Cardiology  [address] [place] [address]  Phone: [phone]  Fax: [phone]  Follow Up Time: Routine    PCP,   Phone: (   )    -  Fax: (   )    -  Follow Up Time: Routine

## 2024-01-30 NOTE — DISCHARGE NOTE PROVIDER - NSDCFUSCHEDAPPT_GEN_ALL_CORE_FT
Sari Long  [facility]  [address]  Scheduled Appointment: 01/30/2024

## 2024-01-30 NOTE — DISCHARGE NOTE PROVIDER - PROVIDER TOKENS
PROVIDER:[TOKEN:[[id number]],FOLLOWUP:[1-3 days]],PROVIDER:[TOKEN:[[id number]],FOLLOWUP:[Routine]],FREE:[LAST:[PCP],PHONE:[(   )    -],FAX:[(   )    -],FOLLOWUP:[Routine]]

## 2024-01-30 NOTE — PROGRESS NOTE ADULT - SUBJECTIVE AND OBJECTIVE BOX
Follow up for SOB / COVID infection   cronic hypoxia     Pt is seen in am   she is feeling same , c/o swelling in leg wet feeling in right lower leg   examined skin with nurse no tear or opening seen     MEDICATIONS  (STANDING):  atorvastatin 80 milliGRAM(s) Oral at bedtime  budesonide  80 MICROgram(s)/formoterol 4.5 MICROgram(s) Inhaler 2 Puff(s) Inhalation two times a day  buMETAnide 2 milliGRAM(s) Oral daily  chlorhexidine 2% Cloths 1 Application(s) Topical <User Schedule>  clotrimazole/betamethasone Cream 1 Application(s) Topical two times a day  dextrose 5%. 1000 milliLiter(s) (100 mL/Hr) IV Continuous <Continuous>  dextrose 5%. 1000 milliLiter(s) (50 mL/Hr) IV Continuous <Continuous>  dextrose 50% Injectable 25 Gram(s) IV Push once  dextrose 50% Injectable 12.5 Gram(s) IV Push once  dextrose 50% Injectable 25 Gram(s) IV Push once  famotidine    Tablet 20 milliGRAM(s) Oral daily  glucagon  Injectable 1 milliGRAM(s) IntraMuscular once  insulin glargine Injectable (LANTUS) 45 Unit(s) SubCutaneous every morning  insulin glargine Injectable (LANTUS) 40 Unit(s) SubCutaneous at bedtime  insulin lispro (ADMELOG) corrective regimen sliding scale   SubCutaneous three times a day before meals  insulin lispro Injectable (ADMELOG) 12 Unit(s) SubCutaneous three times a day before meals  lactulose Syrup 20 Gram(s) Oral daily  lidocaine   4% Patch 1 Patch Transdermal daily  metoprolol succinate  milliGRAM(s) Oral daily  multivitamin 1 Tablet(s) Oral daily  oxyCODONE  ER Tablet 30 milliGRAM(s) Oral every 12 hours  rivaroxaban 20 milliGRAM(s) Oral with dinner  senna 2 Tablet(s) Oral at bedtime      Vital Signs Last 24 Hrs  T(C): 36.4 (30 Jan 2024 09:00), Max: 37.1 (29 Jan 2024 20:47)  T(F): 97.5 (30 Jan 2024 09:00), Max: 98.8 (29 Jan 2024 20:47)  HR: 87 (30 Jan 2024 09:00) (70 - 110)  BP: 96/57 (30 Jan 2024 09:00) (90/59 - 148/79)  BP(mean): --  RR: 18 (30 Jan 2024 09:00) (18 - 20)  SpO2: 97% (30 Jan 2024 09:00) (95% - 98%)    Parameters below as of 30 Jan 2024 09:00  Patient On (Oxygen Delivery Method): nasal cannula  O2 Flow (L/min): 4        general : awake alert   Neck Supple , no JVD   Lungs : CTA bilateral , rare rhonchi   Heart : regular s1 . s2  Abd soft no tenderness , Bs +   Ext : + pretibial swelling  ,cronic venous stasis in both legs   feet and leg swollen   left feet TBI                           14.9   11.89 )-----------( 183      ( 29 Jan 2024 06:28 )             48.0     01-29    137  |  90<L>  |  56.5<H>  ----------------------------<  205<H>  3.8   |  34.0<H>  |  1.61<H>    Ca    9.6      29 Jan 2024 06:28      CAPILLARY BLOOD GLUCOSE      POCT Blood Glucose.: 138 mg/dL (30 Jan 2024 08:28)  POCT Blood Glucose.: 196 mg/dL (29 Jan 2024 22:15)  POCT Blood Glucose.: 192 mg/dL (29 Jan 2024 18:10)  POCT Blood Glucose.: 194 mg/dL (29 Jan 2024 12:15)

## 2024-01-30 NOTE — DISCHARGE NOTE PROVIDER - NSDCMRMEDTOKEN_GEN_ALL_CORE_FT
acetaminophen 325 mg oral tablet: 2 tab(s) orally every 6 hours As needed Temp greater or equal to 38C (100.4F), Mild Pain (1 - 3)  ALPRAZolam 0.25 mg oral tablet: 1 tab(s) orally every 8 hours As needed for anxiety/Sleep  atorvastatin 80 mg oral tablet: 1 tab(s) orally once a day (at bedtime)  bisacodyl 10 mg rectal suppository: 1 suppository(ies) rectal once a day As needed Constipation  budesonide-formoterol 80 mcg-4.5 mcg/inh inhalation aerosol: 2 puff(s) inhaled 2 times a day  clotrimazole 1% topical cream: 1 Apply topically to affected area 2 times a day  diphenhydrAMINE 25 mg oral tablet: 1 tab(s) orally 3 times a day as needed for  rash  famotidine 20 mg oral tablet: 1 tab(s) orally once a day  insulin glargine 100 units/mL subcutaneous solution: 34 unit(s) subcutaneous 2 times a day  insulin lispro 100 units/mL injectable solution: 36 unit(s) subcutaneous 3 times a day (with meals)  ipratropium 500 mcg/2.5 mL inhalation solution: 2.5 milliliter(s) inhaled every 6 hours  lactulose 10 g/15 mL oral syrup: 30 milliliter(s) orally once a day  levalbuterol 0.63 mg/3 mL inhalation solution: 3 milliliter(s) inhaled every 6 hours  lidocaine 4% topical film: Apply topically to affected area once a day  melatonin 3 mg oral tablet: 1 tab(s) orally once a day (at bedtime) As needed Insomnia  metoprolol tartrate 75 mg oral tablet: 1 tab(s) orally every 8 hours hold if sbp&lt;100,hr&lt;55  Multiple Vitamins oral tablet: 1 tab(s) orally once a day  nystatin 100,000 units/g topical powder: 1 Apply topically to affected area 2 times a day  oxyCODONE 10 mg oral tablet, extended release: 1 tab(s) orally 2 times a day  oxyCODONE 5 mg oral tablet: 1 tab(s) orally every 6 hours As needed Moderate Pain (4 - 6)  polyethylene glycol 3350 oral powder for reconstitution: 17 gram(s) orally once a day  rivaroxaban 15 mg oral tablet: 1 tab(s) orally once a day  senna leaf extract oral tablet: 2 tab(s) orally once a day (at bedtime)  torsemide 20 mg oral tablet: 1 tab(s) orally once a day   acetaminophen 325 mg oral tablet: 2 tab(s) orally every 6 hours As needed Temp greater or equal to 38C (100.4F), Mild Pain (1 - 3)  albuterol 90 mcg/inh inhalation aerosol: 2 puff(s) inhaled every 6 hours As needed Bronchospasm  ALPRAZolam 0.25 mg oral tablet: 1 tab(s) orally every 8 hours As needed for anxiety/Sleep  atorvastatin 80 mg oral tablet: 1 tab(s) orally once a day (at bedtime)  bisacodyl 10 mg rectal suppository: 1 suppository(ies) rectal once a day As needed Constipation  bumetanide 2 mg oral tablet: 1 tab(s) orally once a day  diphenhydrAMINE 25 mg oral tablet: 1 tab(s) orally 3 times a day as needed for  rash  famotidine 20 mg oral tablet: 1 tab(s) orally once a day  insulin glargine 100 units/mL subcutaneous solution: 34 unit(s) subcutaneous 2 times a day  insulin lispro 100 units/mL injectable solution: 36 unit(s) subcutaneous 3 times a day (with meals)  lactulose 10 g/15 mL oral syrup: 30 milliliter(s) orally once a day  levalbuterol 0.63 mg/3 mL inhalation solution: 3 milliliter(s) inhaled every 6 hours as needed for  shortness of breath and/or wheezing  melatonin 3 mg oral tablet: 1 tab(s) orally once a day (at bedtime) As needed Insomnia  metoprolol succinate 50 mg oral tablet, extended release: 1 tab(s) orally 2 times a day  Multiple Vitamins oral tablet: 1 tab(s) orally once a day  oxyCODONE 10 mg oral tablet, extended release: 1 tab(s) orally 2 times a day  oxyCODONE 5 mg oral tablet: 1 tab(s) orally every 6 hours As needed Moderate Pain (4 - 6)  polyethylene glycol 3350 oral powder for reconstitution: 17 gram(s) orally once a day  potassium chloride 20 mEq oral powder for reconstitution: 1 packet(s) orally once a day  rivaroxaban 15 mg oral tablet: 1 tab(s) orally once a day  senna leaf extract oral tablet: 2 tab(s) orally once a day (at bedtime)

## 2024-01-30 NOTE — DISCHARGE NOTE PROVIDER - NSDCCPCAREPLAN_GEN_ALL_CORE_FT
PRINCIPAL DISCHARGE DIAGNOSIS  Diagnosis: Shortness of breath  Assessment and Plan of Treatment: Multifactorial due to COPD, CHFpEF, COVID, and morbid obesity. Continue with Bumex as prescribed. Continue with home oxygen as needed.      SECONDARY DISCHARGE DIAGNOSES  Diagnosis: COVID  Assessment and Plan of Treatment: Remdesivir course completed. Adhere to isolation guidelines per CDC recommendations.    Diagnosis: (HFpEF) heart failure with preserved ejection fraction  Assessment and Plan of Treatment: Continue with Bumex.    Diagnosis: History of COPD  Assessment and Plan of Treatment: Continue with home oxygen as needed. Continue with Symbicort and Proventil    Diagnosis: Chronic atrial fibrillation  Assessment and Plan of Treatment: Continue with Xarelto and Toprol.    Diagnosis: Stage 3 chronic kidney disease  Assessment and Plan of Treatment: Stable.    Diagnosis: DM (diabetes mellitus)  Assessment and Plan of Treatment: Continue with diabetic medication regimen.    Diagnosis: HLD (hyperlipidemia)  Assessment and Plan of Treatment: Continue with Atorvastatin.    Diagnosis: HTN (hypertension)  Assessment and Plan of Treatment: Continue with Lopressor.    Diagnosis: Spinal stenosis  Assessment and Plan of Treatment: Continue with Oxycodone and muscle relaxant as prescribed.    Diagnosis: Anxiety  Assessment and Plan of Treatment: Continue with Xanax as prescribed.     PRINCIPAL DISCHARGE DIAGNOSIS  Diagnosis: Shortness of breath  Assessment and Plan of Treatment: Multifactorial due to COPD, CHFpEF, COVID, and morbid obesity. Continue with Bumex as prescribed. Continue with BIAP at anight  oxygen      SECONDARY DISCHARGE DIAGNOSES  Diagnosis: COVID  Assessment and Plan of Treatment: Remdesivir course completed. Adhere to isolation guidelines per CDC recommendations.    Diagnosis: Chronic atrial fibrillation  Assessment and Plan of Treatment: Continue with Xarelto and Toprol.    Diagnosis: Stage 3 chronic kidney disease  Assessment and Plan of Treatment: Stable.    Diagnosis: HLD (hyperlipidemia)  Assessment and Plan of Treatment: Continue with Atorvastatin.    Diagnosis: HTN (hypertension)  Assessment and Plan of Treatment: Continue with Lopressor.    Diagnosis: Spinal stenosis  Assessment and Plan of Treatment: Continue with Oxycodone and muscle relaxant as prescribed.    Diagnosis: Anxiety  Assessment and Plan of Treatment: Continue with Xanax as prescribed.    Diagnosis: DM (diabetes mellitus)  Assessment and Plan of Treatment: Continue with diabetic medication regimen.    Diagnosis: (HFpEF) heart failure with preserved ejection fraction  Assessment and Plan of Treatment: Continue with Bumex.    Diagnosis: History of COPD  Assessment and Plan of Treatment: Continue with home oxygen as needed. Continue with Symbicort and Proventil

## 2024-01-30 NOTE — DISCHARGE NOTE PROVIDER - HOSPITAL COURSE
67 y/o female with PMHx of HTN, HLD, DM2, HFpEF, CKD3, DVT, Uterine CA, afib on Xarelto, COPD on home O2 4L, chronic lymphedema sent from [facility] to the ED for chest pain. Pt reported pressure pain located on the left side, non-radiating, and associated with shortness of breath. Of note, she was recently admitted and treated for CHF exacerbation, RSV and found to have new onset afib. In the ED, VBG w/ respiratory acidosis. BiPAP was recommended, but pt declined. She was found to +COVID. Remdesivir completed. She is tolerated PO Bumex. Pt is medically stable for d/c. 69 y/o female with PMHx of HTN, HLD, DM2, HFpEF, CKD3, DVT, Uterine CA, afib on Xarelto, COPD on home O2 4L, chronic lymphedema sent from [facility] to the ED for chest pain. Pt reported pressure pain located on the left side, non-radiating, and associated with shortness of breath. Of note, she was recently admitted and treated for CHF exacerbation, RSV and found to have new onset afib. In the ED, VBG w/ respiratory acidosis. BiPAP was recommended, but pt declined. She was found to +COVID. Remdesivir completed. She is tolerated PO Bumex. Pt can continue with BIPAP 12/6 as tolerated   will need formal sleep study outpatient per pulmonologist   weaned to home 4L O2   if ongoing aggressive diuresis, may require Diamox     Pt is medically stable for d/c.She is also with constipation , miralx , senna and lactulose given

## 2024-01-31 ENCOUNTER — TRANSCRIPTION ENCOUNTER (OUTPATIENT)
Age: 69
End: 2024-01-31

## 2024-01-31 LAB
ANION GAP SERPL CALC-SCNC: 14 MMOL/L — SIGNIFICANT CHANGE UP (ref 5–17)
BUN SERPL-MCNC: 51.4 MG/DL — HIGH (ref 8–20)
CALCIUM SERPL-MCNC: 9.2 MG/DL — SIGNIFICANT CHANGE UP (ref 8.4–10.5)
CHLORIDE SERPL-SCNC: 91 MMOL/L — LOW (ref 96–108)
CO2 SERPL-SCNC: 33 MMOL/L — HIGH (ref 22–29)
CREAT SERPL-MCNC: 1.47 MG/DL — HIGH (ref 0.5–1.3)
EGFR: 38 ML/MIN/1.73M2 — LOW
GAS PNL BLDA: SIGNIFICANT CHANGE UP
GLUCOSE BLDC GLUCOMTR-MCNC: 192 MG/DL — HIGH (ref 70–99)
GLUCOSE BLDC GLUCOMTR-MCNC: 251 MG/DL — HIGH (ref 70–99)
GLUCOSE BLDC GLUCOMTR-MCNC: 285 MG/DL — HIGH (ref 70–99)
GLUCOSE BLDC GLUCOMTR-MCNC: 290 MG/DL — HIGH (ref 70–99)
GLUCOSE SERPL-MCNC: 257 MG/DL — HIGH (ref 70–99)
HCT VFR BLD CALC: 40 % — SIGNIFICANT CHANGE UP (ref 34.5–45)
HGB BLD-MCNC: 13.3 G/DL — SIGNIFICANT CHANGE UP (ref 11.5–15.5)
LACTATE SERPL-SCNC: 2.1 MMOL/L — HIGH (ref 0.5–2)
MCHC RBC-ENTMCNC: 30.2 PG — SIGNIFICANT CHANGE UP (ref 27–34)
MCHC RBC-ENTMCNC: 33.3 GM/DL — SIGNIFICANT CHANGE UP (ref 32–36)
MCV RBC AUTO: 90.9 FL — SIGNIFICANT CHANGE UP (ref 80–100)
PLATELET # BLD AUTO: 153 K/UL — SIGNIFICANT CHANGE UP (ref 150–400)
POTASSIUM SERPL-MCNC: 3.2 MMOL/L — LOW (ref 3.5–5.3)
POTASSIUM SERPL-SCNC: 3.2 MMOL/L — LOW (ref 3.5–5.3)
RBC # BLD: 4.4 M/UL — SIGNIFICANT CHANGE UP (ref 3.8–5.2)
RBC # FLD: 14 % — SIGNIFICANT CHANGE UP (ref 10.3–14.5)
SODIUM SERPL-SCNC: 137 MMOL/L — SIGNIFICANT CHANGE UP (ref 135–145)
WBC # BLD: 10.15 K/UL — SIGNIFICANT CHANGE UP (ref 3.8–10.5)
WBC # FLD AUTO: 10.15 K/UL — SIGNIFICANT CHANGE UP (ref 3.8–10.5)

## 2024-01-31 PROCEDURE — 99223 1ST HOSP IP/OBS HIGH 75: CPT

## 2024-01-31 PROCEDURE — 99232 SBSQ HOSP IP/OBS MODERATE 35: CPT

## 2024-01-31 RX ORDER — POTASSIUM CHLORIDE 20 MEQ
40 PACKET (EA) ORAL ONCE
Refills: 0 | Status: DISCONTINUED | OUTPATIENT
Start: 2024-01-31 | End: 2024-01-31

## 2024-01-31 RX ORDER — SODIUM CHLORIDE 9 MG/ML
500 INJECTION INTRAMUSCULAR; INTRAVENOUS; SUBCUTANEOUS ONCE
Refills: 0 | Status: COMPLETED | OUTPATIENT
Start: 2024-01-31 | End: 2024-01-31

## 2024-01-31 RX ORDER — POTASSIUM CHLORIDE 20 MEQ
10 PACKET (EA) ORAL
Refills: 0 | Status: COMPLETED | OUTPATIENT
Start: 2024-01-31 | End: 2024-01-31

## 2024-01-31 RX ORDER — POTASSIUM CHLORIDE 20 MEQ
40 PACKET (EA) ORAL ONCE
Refills: 0 | Status: COMPLETED | OUTPATIENT
Start: 2024-01-31 | End: 2024-01-31

## 2024-01-31 RX ORDER — POTASSIUM CHLORIDE 20 MEQ
20 PACKET (EA) ORAL DAILY
Refills: 0 | Status: DISCONTINUED | OUTPATIENT
Start: 2024-01-31 | End: 2024-01-31

## 2024-01-31 RX ORDER — POTASSIUM CHLORIDE 20 MEQ
20 PACKET (EA) ORAL ONCE
Refills: 0 | Status: COMPLETED | OUTPATIENT
Start: 2024-01-31 | End: 2024-01-31

## 2024-01-31 RX ORDER — POLYETHYLENE GLYCOL 3350 17 G/17G
17 POWDER, FOR SOLUTION ORAL DAILY
Refills: 0 | Status: DISCONTINUED | OUTPATIENT
Start: 2024-01-31 | End: 2024-02-01

## 2024-01-31 RX ORDER — POTASSIUM CHLORIDE 20 MEQ
20 PACKET (EA) ORAL DAILY
Refills: 0 | Status: DISCONTINUED | OUTPATIENT
Start: 2024-01-31 | End: 2024-02-01

## 2024-01-31 RX ADMIN — INSULIN GLARGINE 40 UNIT(S): 100 INJECTION, SOLUTION SUBCUTANEOUS at 22:03

## 2024-01-31 RX ADMIN — OXYCODONE HYDROCHLORIDE 30 MILLIGRAM(S): 5 TABLET ORAL at 18:22

## 2024-01-31 RX ADMIN — SODIUM CHLORIDE 1000 MILLILITER(S): 9 INJECTION INTRAMUSCULAR; INTRAVENOUS; SUBCUTANEOUS at 20:36

## 2024-01-31 RX ADMIN — Medication 7: at 12:08

## 2024-01-31 RX ADMIN — CYCLOBENZAPRINE HYDROCHLORIDE 10 MILLIGRAM(S): 10 TABLET, FILM COATED ORAL at 18:17

## 2024-01-31 RX ADMIN — BUDESONIDE AND FORMOTEROL FUMARATE DIHYDRATE 2 PUFF(S): 160; 4.5 AEROSOL RESPIRATORY (INHALATION) at 08:44

## 2024-01-31 RX ADMIN — FAMOTIDINE 20 MILLIGRAM(S): 10 INJECTION INTRAVENOUS at 11:56

## 2024-01-31 RX ADMIN — CHLORHEXIDINE GLUCONATE 1 APPLICATION(S): 213 SOLUTION TOPICAL at 05:11

## 2024-01-31 RX ADMIN — CLOTRIMAZOLE AND BETAMETHASONE DIPROPIONATE 1 APPLICATION(S): 10; .5 CREAM TOPICAL at 17:23

## 2024-01-31 RX ADMIN — LACTULOSE 20 GRAM(S): 10 SOLUTION ORAL at 11:56

## 2024-01-31 RX ADMIN — OXYCODONE HYDROCHLORIDE 5 MILLIGRAM(S): 5 TABLET ORAL at 08:42

## 2024-01-31 RX ADMIN — BUMETANIDE 2 MILLIGRAM(S): 0.25 INJECTION INTRAMUSCULAR; INTRAVENOUS at 05:07

## 2024-01-31 RX ADMIN — POLYETHYLENE GLYCOL 3350 17 GRAM(S): 17 POWDER, FOR SOLUTION ORAL at 11:54

## 2024-01-31 RX ADMIN — OXYCODONE HYDROCHLORIDE 5 MILLIGRAM(S): 5 TABLET ORAL at 09:42

## 2024-01-31 RX ADMIN — Medication 3 MILLIGRAM(S): at 01:20

## 2024-01-31 RX ADMIN — Medication 100 MILLIGRAM(S): at 05:06

## 2024-01-31 RX ADMIN — Medication 40 MILLIEQUIVALENT(S): at 11:55

## 2024-01-31 RX ADMIN — Medication 12 UNIT(S): at 17:25

## 2024-01-31 RX ADMIN — RIVAROXABAN 20 MILLIGRAM(S): KIT at 17:22

## 2024-01-31 RX ADMIN — Medication 3: at 08:43

## 2024-01-31 RX ADMIN — Medication 12 UNIT(S): at 08:43

## 2024-01-31 RX ADMIN — Medication 0.25 MILLIGRAM(S): at 11:56

## 2024-01-31 RX ADMIN — Medication 1 TABLET(S): at 11:56

## 2024-01-31 RX ADMIN — ATORVASTATIN CALCIUM 80 MILLIGRAM(S): 80 TABLET, FILM COATED ORAL at 22:03

## 2024-01-31 RX ADMIN — Medication 7: at 17:25

## 2024-01-31 RX ADMIN — Medication 20 MILLIEQUIVALENT(S): at 17:22

## 2024-01-31 RX ADMIN — OXYCODONE HYDROCHLORIDE 30 MILLIGRAM(S): 5 TABLET ORAL at 06:07

## 2024-01-31 RX ADMIN — Medication 12 UNIT(S): at 12:09

## 2024-01-31 RX ADMIN — OXYCODONE HYDROCHLORIDE 30 MILLIGRAM(S): 5 TABLET ORAL at 05:07

## 2024-01-31 RX ADMIN — SODIUM CHLORIDE 1000 MILLILITER(S): 9 INJECTION INTRAMUSCULAR; INTRAVENOUS; SUBCUTANEOUS at 22:03

## 2024-01-31 RX ADMIN — SENNA PLUS 2 TABLET(S): 8.6 TABLET ORAL at 22:03

## 2024-01-31 RX ADMIN — OXYCODONE HYDROCHLORIDE 30 MILLIGRAM(S): 5 TABLET ORAL at 17:22

## 2024-01-31 RX ADMIN — INSULIN GLARGINE 45 UNIT(S): 100 INJECTION, SOLUTION SUBCUTANEOUS at 08:44

## 2024-01-31 RX ADMIN — OXYCODONE HYDROCHLORIDE 5 MILLIGRAM(S): 5 TABLET ORAL at 23:03

## 2024-01-31 NOTE — PROGRESS NOTE ADULT - PROVIDER SPECIALTY LIST ADULT
Hospitalist
Hospitalist
Internal Medicine
Hospitalist
Hospitalist
Cardiology
Hospitalist
Internal Medicine
Pain Medicine
Internal Medicine
Internal Medicine
Cardiology
Cardiology

## 2024-01-31 NOTE — PROGRESS NOTE ADULT - ASSESSMENT
67 y/o female with PMH of HTN, HLD, DM2, HFpEF, CKD3, DVT, Uterine CA, afib on Xarelto, COPD on home O2 4L, chronic lymphedema sent from [facility] to the ED for  chest pain. Patient reported pressure pain located on the left side, non-radiating, associated with shortness of breath. In the ED, VBG done, respiratory acidosis, BiPAP offered but patient reportedly declined. Lasix given for possible CHF ex; cardiology consulted.     1-Shortness of breath, multifactorial in setting of COVID infection   COPD, CHFpEF and morbid obesity   cont bumex po , oxygen   Patient on oxygen 4L (home dose)    completed remdesivir     2-afib  xarelto   toprol  stable     3-CKD-3   Patient on diuretic chronically   cr 1.65    4-HTN/HLD   Atorvastatin 80mg   Lopressor daily     5- Cronic pain . h/o spinal stenosis   on oxycodone ER and IR   cont muscle relaxant   xanax for anxiety as needed       6-DM-2   Lantus bid and ssi   Bg is controlled     dc to [facility] pending authorization  67 y/o female with PMH of HTN, HLD, DM2, HFpEF, CKD3, DVT, Uterine CA, afib on Xarelto, COPD on home O2 4L, chronic lymphedema sent from [facility] to the ED for  chest pain. Patient reported pressure pain located on the left side, non-radiating, associated with shortness of breath. In the ED, VBG done, respiratory acidosis, BiPAP offered but patient reportedly declined. Lasix given for possible CHF ex; cardiology consulted.     1-Shortness of breath, respiratory failure hypoxia hypercapnia in setting of COVID COPD    CHFpEF and morbid obesity   cont bumex po , oxygen   Patient on oxygen 4L (home dose)    completed remdesivir   ot does not wear BIPAP regularly   counseling provided to daily use at night   she need offical sleep studies as outpatient     2- Hypokalemia  iv K ordered could not tolerate   po K given   repaet Po later today   BMP in am if K improves will DC to [facility] tomorrow     3-afib  xarelto   toprol  stable     4-CKD stage 3   Patient on diuretic chronically   stable     5-HTN/HLD   Atorvastatin 80mg   Lopressor daily     6- Cronic pain . h/o spinal stenosis   on oxycodone ER and IR   cont muscle relaxant   xanax for anxiety as needed       7-DM-2   Lantus bid and ssi   Bg is controlled     dc to [facility] pending authorization

## 2024-01-31 NOTE — PROGRESS NOTE ADULT - SUBJECTIVE AND OBJECTIVE BOX
Follow up for SOB / COVID infection ,hypoxia     pt is seen eralier today   for DC   pending pulmonary evaluation  to finalize BIPAP order settin g     pt is with constipation          MEDICATIONS  (STANDING):  atorvastatin 80 milliGRAM(s) Oral at bedtime  budesonide  80 MICROgram(s)/formoterol 4.5 MICROgram(s) Inhaler 2 Puff(s) Inhalation two times a day  buMETAnide 2 milliGRAM(s) Oral daily  chlorhexidine 2% Cloths 1 Application(s) Topical <User Schedule>  clotrimazole/betamethasone Cream 1 Application(s) Topical two times a day  dextrose 5%. 1000 milliLiter(s) (100 mL/Hr) IV Continuous <Continuous>  dextrose 5%. 1000 milliLiter(s) (50 mL/Hr) IV Continuous <Continuous>  dextrose 50% Injectable 25 Gram(s) IV Push once  dextrose 50% Injectable 12.5 Gram(s) IV Push once  dextrose 50% Injectable 25 Gram(s) IV Push once  famotidine    Tablet 20 milliGRAM(s) Oral daily  glucagon  Injectable 1 milliGRAM(s) IntraMuscular once  insulin glargine Injectable (LANTUS) 45 Unit(s) SubCutaneous every morning  insulin glargine Injectable (LANTUS) 40 Unit(s) SubCutaneous at bedtime  insulin lispro (ADMELOG) corrective regimen sliding scale   SubCutaneous three times a day before meals  insulin lispro Injectable (ADMELOG) 12 Unit(s) SubCutaneous three times a day before meals  lactulose Syrup 20 Gram(s) Oral daily  lidocaine   4% Patch 1 Patch Transdermal daily  metoprolol succinate  milliGRAM(s) Oral daily  multivitamin 1 Tablet(s) Oral daily  oxyCODONE  ER Tablet 30 milliGRAM(s) Oral every 12 hours  rivaroxaban 20 milliGRAM(s) Oral with dinner  senna 2 Tablet(s) Oral at bedtime      Vital Signs Last 24 Hrs  T(C): 36.3 (31 Jan 2024 05:08), Max: 37.2 (30 Jan 2024 20:00)  T(F): 97.3 (31 Jan 2024 05:08), Max: 98.9 (30 Jan 2024 20:00)  HR: 63 (31 Jan 2024 05:08) (63 - 92)  BP: 107/71 (31 Jan 2024 05:08) (105/68 - 126/78)  BP(mean): --  RR: 20 (31 Jan 2024 05:08) (18 - 20)  SpO2: 100% (31 Jan 2024 05:08) (94% - 100%)    Parameters below as of 31 Jan 2024 05:08  Patient On (Oxygen Delivery Method): nasal cannula  O2 Flow (L/min): 4        general : awake alert   Neck Supple , no JVD   Lungs : CTA bilateral , rare rhonchi   Heart : regular s1 . s2  Abd soft no tenderness , Bs +   Ext : + pretibial swelling  ,cronic venous stasis in both legs   feet and leg swollen   left feet TBI                           14.9   11.89 )-----------( 183      ( 29 Jan 2024 06:28 )             48.0     01-29    137  |  90<L>  |  56.5<H>  ----------------------------<  205<H>  3.8   |  34.0<H>  |  1.61<H>    Ca    9.6      29 Jan 2024 06:28      CAPILLARY BLOOD GLUCOSE      POCT Blood Glucose.: 138 mg/dL (30 Jan 2024 08:28)  POCT Blood Glucose.: 196 mg/dL (29 Jan 2024 22:15)  POCT Blood Glucose.: 192 mg/dL (29 Jan 2024 18:10)  POCT Blood Glucose.: 194 mg/dL (29 Jan 2024 12:15)   Follow up for SOB / COVID infection ,hypoxia     pt is seen eralier today   for DC   pending pulmonary evaluation  to finalize BIPAP order settin g     pt is with constipation          MEDICATIONS  (STANDING):  atorvastatin 80 milliGRAM(s) Oral at bedtime  budesonide  80 MICROgram(s)/formoterol 4.5 MICROgram(s) Inhaler 2 Puff(s) Inhalation two times a day  buMETAnide 2 milliGRAM(s) Oral daily  chlorhexidine 2% Cloths 1 Application(s) Topical <User Schedule>  clotrimazole/betamethasone Cream 1 Application(s) Topical two times a day  dextrose 5%. 1000 milliLiter(s) (100 mL/Hr) IV Continuous <Continuous>  dextrose 5%. 1000 milliLiter(s) (50 mL/Hr) IV Continuous <Continuous>  dextrose 50% Injectable 25 Gram(s) IV Push once  dextrose 50% Injectable 12.5 Gram(s) IV Push once  dextrose 50% Injectable 25 Gram(s) IV Push once  famotidine    Tablet 20 milliGRAM(s) Oral daily  glucagon  Injectable 1 milliGRAM(s) IntraMuscular once  insulin glargine Injectable (LANTUS) 45 Unit(s) SubCutaneous every morning  insulin glargine Injectable (LANTUS) 40 Unit(s) SubCutaneous at bedtime  insulin lispro (ADMELOG) corrective regimen sliding scale   SubCutaneous three times a day before meals  insulin lispro Injectable (ADMELOG) 12 Unit(s) SubCutaneous three times a day before meals  lactulose Syrup 20 Gram(s) Oral daily  lidocaine   4% Patch 1 Patch Transdermal daily  metoprolol succinate  milliGRAM(s) Oral daily  multivitamin 1 Tablet(s) Oral daily  oxyCODONE  ER Tablet 30 milliGRAM(s) Oral every 12 hours  rivaroxaban 20 milliGRAM(s) Oral with dinner  senna 2 Tablet(s) Oral at bedtime      Vital Signs Last 24 Hrs  T(C): 36.3 (31 Jan 2024 05:08), Max: 37.2 (30 Jan 2024 20:00)  T(F): 97.3 (31 Jan 2024 05:08), Max: 98.9 (30 Jan 2024 20:00)  HR: 63 (31 Jan 2024 05:08) (63 - 92)  BP: 107/71 (31 Jan 2024 05:08) (105/68 - 126/78)  BP(mean): --  RR: 20 (31 Jan 2024 05:08) (18 - 20)  SpO2: 100% (31 Jan 2024 05:08) (94% - 100%)    Parameters below as of 31 Jan 2024 05:08  Patient On (Oxygen Delivery Method): nasal cannula  O2 Flow (L/min): 4        general : awake alert   Neck Supple , no JVD   Lungs : CTA bilateral , rare rhonchi   Heart : regular s1 . s2  Abd soft no tenderness , Bs +   Ext : + pretibial swelling  ,cronic venous stasis in both legs   feet and leg swollen   left feet TBI           CAPILLARY BLOOD GLUCOSE      POCT Blood Glucose.: 251 mg/dL (31 Jan 2024 11:46)  POCT Blood Glucose.: 192 mg/dL (31 Jan 2024 08:41)  POCT Blood Glucose.: 277 mg/dL (30 Jan 2024 21:29)  POCT Blood Glucose.: 225 mg/dL (30 Jan 2024 18:21)                          13.3   10.15 )-----------( 153      ( 31 Jan 2024 10:43 )             40.0   01-31    137  |  91<L>  |  51.4<H>  ----------------------------<  257<H>  3.2<L>   |  33.0<H>  |  1.47<H>    Ca    9.2      31 Jan 2024 10:43

## 2024-01-31 NOTE — CONSULT NOTE ADULT - ASSESSMENT
68F never smoker with hx of HFpEF, on home O2 4L, pulmonary HTN with mPA 40 on RHC 12/29, morbid obesity, asthma, CKD3, IDDM2, uterine cancer s/p MEGAN, DVT LLE (2004), chronic leg edema 2/2 venous stasis, spinal stenosis with chronic back pain, recent admission for RSV infection presented 1/20 with chest pain and found to be covid positive and HFpEF exacerbation now improved     Acute on chronic hypoxic respiratory failure secondary to HFpEF, covid PNA on baseline asthma and severe pulmonary HTN and probable ENRIQUE/OHS   diuretics as needed to maintain euvolemic   c/w Symbicort   Xopenex prn  completed course of dexamethasone/remdesivir for covid PNA  can continue with BIPAP 12/6 as tolerated   will need formal sleep study outpatient   weaned to home 4L O2   if ongoing aggressive diuresis, may require Diamox   on full dose AC   incentive spirometry   OOB/PT   cardiology f/u outpatient  f/u Dr. Long outpatient   bariatrics eval outpatient          68F never smoker with hx of HFpEF, on home O2 4L, pulmonary HTN with mPA 40 on RHC 12/29, morbid obesity, asthma, CKD3, IDDM2, uterine cancer s/p MEGAN, DVT LLE (2004), chronic leg edema 2/2 venous stasis, spinal stenosis with chronic back pain, recent admission for RSV infection presented 1/20 with chest pain and found to be covid positive and HFpEF exacerbation now improved     Acute on chronic hypoxic respiratory failure secondary to HFpEF, covid PNA on baseline asthma and severe pulmonary HTN and probable ENRIQUE/OHS   diuretics as needed to maintain euvolemic   c/w Symbicort   Xopenex prn  completed course of dexamethasone/remdesivir for covid PNA  can continue with BIPAP 12/6 as tolerated   will need formal sleep study outpatient   weaned to home 4L O2   if ongoing aggressive diuresis, may require Diamox   on full dose AC   incentive spirometry   OOB/PT   cardiology f/u outpatient  f/u Dr. Long outpatient   bariatrics eval outpatient   please call with any questions or change in clinical condition

## 2024-01-31 NOTE — PROGRESS NOTE ADULT - REASON FOR ADMISSION
covid
covid
covid and chf
covid pna
covid
covid and chf
covid pna and heart failure

## 2024-01-31 NOTE — DISCHARGE NOTE NURSING/CASE MANAGEMENT/SOCIAL WORK - NSDCVIVACCINE_GEN_ALL_CORE_FT
COVID-19 vaccine, vector-nr, [id number], PF, 0.5 mL (Omar); 16-Mar-2021 13:03; Kimberly Maya (RN); [facility]; [id number] (Exp. Date: 26-May-2021); IntraMuscular; Deltoid Left.; 0.5 milliLiter(s);   COVID-19, mRNA, LNP-S, PF, 100 mcg/ 0.5 mL dose (Moderna); 28-Jan-2022 10:09; Senia Juan (RN); [employer] [id number] (Exp. Date: 14-Apr-2022); IntraMuscular; Deltoid Left.; 0.25 milliLiter(s);   influenza, injectable, quadrivalent, preservative free; 09-Nov-2018 13:40; Mane Camejo (RN); [employer]; [id number] (Exp. Date: 16-Jun-2019); IntraMuscular; Deltoid Left.; 0.5 milliLiter(s); VIS (VIS Published: 07-Aug-2015, VIS Presented: 09-Nov-2018);   influenza, injectable, quadrivalent, preservative free; 15-Nov-2020 12:04; Ramiro Alexander (RN); [employer]; [id number] (Exp. Date: 30-Jun-2021); IntraMuscular; Deltoid Left.; 0.5 milliLiter(s); VIS (VIS Published: 15-Aug-2019, VIS Presented: 15-Nov-2020);   influenza, high-dose, quadrivalent; 28-Jan-2022 11:42; Senia Juan (RN); [employer]; [id number] (Exp. Date: 30-Jun-2022); IntraMuscular; Deltoid Right.; 0.7 milliLiter(s); VIS (VIS Published: 06-Aug-2021, VIS Presented: 28-Jan-2022);   influenza, high-dose, quadrivalent; 04-Oct-2023 13:14; Coreen Abraham (RN); [employer]; [id number] (Exp. Date: 01-Jun-2026); IntraMuscular; Deltoid Left.; 0.7 milliLiter(s); VIS (VIS Published: 06-Aug-2021, VIS Presented: 04-Oct-2023);   Tdap; 24-Jan-2022 08:46; Lisa Yanes (RN); [employer]; [id number] (Exp. Date: 09-Sep-2023); IntraMuscular; Deltoid Left.; 0.5 milliLiter(s); VIS (VIS Published: 09-May-2013, VIS Presented: 24-Jan-2022);   Tdap; 30-Aug-2023 17:21; Edwige Reyes (DANIEL); [employer]; [id number] (Exp. Date: 03-Oct-2025); IntraMuscular; Deltoid Left.; 0.5 milliLiter(s); VIS (VIS Published: 09-May-2013, VIS Presented: 30-Aug-2023);

## 2024-01-31 NOTE — DISCHARGE NOTE NURSING/CASE MANAGEMENT/SOCIAL WORK - PATIENT PORTAL LINK FT
You can access the FollowMyHealth Patient Portal offered by [facility] by registering at the following website: [email or web address]. By joining [employer]’s FollowMyHealth portal, you will also be able to view your health information using other applications (apps) compatible with our system.

## 2024-01-31 NOTE — CONSULT NOTE ADULT - SUBJECTIVE AND OBJECTIVE BOX
Patient is a 68y old  Female who presents with a chief complaint of covid (31 Jan 2024 11:25)    HPI:  68F never smoker with hx of HFpEF, on home O2 4L, pulmonary HTN with mPA 40 on RHC 12/29, morbid obesity, asthma, CKD3, IDDM2, uterine cancer s/p MEGAN, DVT LLE (2004), chronic leg edema 2/2 venous stasis, spinal stenosis with chronic back pain, recent admission for RSV infection presented 1/20 with chest pain and found to be covid positive found to be in CHF exacerbation and treated for covid PNA and treated for Afib RVR. CT chest with resolving RML consolidation, subsegmental atelectasis and resolving GGO LLL. Pt was ordered for BIPAP while in hospital with intermittent use due to difficulty tolerating the mask. ABG 7.51/49/114 on 4L NC.     Pt reports her shortness of breath is much improved. She was weaned back to her home 4L NC. She last used NIV 2 nights ago. Has been ordered for 12/6 40%. Denies any cough. Denies any fevers/chills. Denies chest pain/wheezing. Reports lower extremity edema improving. Afebrile. Planned for [facility].         PAST MEDICAL & SURGICAL HISTORY:  Diabetes Mellitus Type II      Endometrial Hyperplasia      Cervical Stenosis of Spine      Spinal Stenosis, Lumbar      Deep Vein Thrombosis (DVT)  Left leg, 2004, treated and resolved      Dyslipidemia      Cataract      Morbid Obesity      Vitamin D deficiency      Insomnia      CKD (chronic kidney disease)  ~ III      Congestive heart failure  ~ HFpEF      Uterine cancer      Asthma      On home oxygen therapy      Gait difficulty  ~ u ses walker      Cataract extracted with lens implant 1998  Right      C Section 1994      Cholecystectomy/appendectomy @ age 25      D&C x2 1980's      D&C 2008  hysteroscopy, endometrial hyperplasia, 2009      Cervical Spinal Stenosis surgery x2 (01/2002, 7/2002)      Tonsillectomy as a child      Endometrial biopsy 12/02/09      H/O laser iridotomy  left eye, 2016      H/O colonoscopy  1998      S/P total abdominal hysterectomy and bilateral salpingo-oophorectomy      S/P appendectomy      S/P cholecystectomy    Medications:    buMETAnide 2 milliGRAM(s) Oral daily  metoprolol succinate  milliGRAM(s) Oral daily  metoprolol tartrate Injectable 5 milliGRAM(s) IV Push every 6 hours PRN    albuterol    90 MICROgram(s) HFA Inhaler 2 Puff(s) Inhalation every 6 hours PRN  budesonide  80 MICROgram(s)/formoterol 4.5 MICROgram(s) Inhaler 2 Puff(s) Inhalation two times a day    acetaminophen     Tablet .. 650 milliGRAM(s) Oral every 6 hours PRN  ALPRAZolam 0.25 milliGRAM(s) Oral three times a day PRN  cyclobenzaprine 10 milliGRAM(s) Oral three times a day PRN  melatonin 3 milliGRAM(s) Oral at bedtime PRN  ondansetron Injectable 4 milliGRAM(s) IV Push every 8 hours PRN  oxyCODONE    IR 5 milliGRAM(s) Oral every 4 hours PRN  oxyCODONE  ER Tablet 30 milliGRAM(s) Oral every 12 hours      rivaroxaban 20 milliGRAM(s) Oral with dinner    aluminum hydroxide/magnesium hydroxide/simethicone Suspension 30 milliLiter(s) Oral every 4 hours PRN  famotidine    Tablet 20 milliGRAM(s) Oral daily  lactulose Syrup 20 Gram(s) Oral daily  polyethylene glycol 3350 17 Gram(s) Oral daily  senna 2 Tablet(s) Oral at bedtime      atorvastatin 80 milliGRAM(s) Oral at bedtime  dextrose 50% Injectable 25 Gram(s) IV Push once  dextrose 50% Injectable 12.5 Gram(s) IV Push once  dextrose 50% Injectable 25 Gram(s) IV Push once  dextrose Oral Gel 15 Gram(s) Oral once PRN  glucagon  Injectable 1 milliGRAM(s) IntraMuscular once  insulin glargine Injectable (LANTUS) 45 Unit(s) SubCutaneous every morning  insulin glargine Injectable (LANTUS) 40 Unit(s) SubCutaneous at bedtime  insulin lispro (ADMELOG) corrective regimen sliding scale   SubCutaneous three times a day before meals  insulin lispro Injectable (ADMELOG) 12 Unit(s) SubCutaneous three times a day before meals    dextrose 5%. 1000 milliLiter(s) IV Continuous <Continuous>  dextrose 5%. 1000 milliLiter(s) IV Continuous <Continuous>  multivitamin 1 Tablet(s) Oral daily  potassium chloride   Powder 20 milliEquivalent(s) Oral daily      chlorhexidine 2% Cloths 1 Application(s) Topical <User Schedule>  clotrimazole/betamethasone Cream 1 Application(s) Topical two times a day  lidocaine   4% Patch 1 Patch Transdermal daily    Allergies    adhesives (Rash)  latex (Rash)  wool- rash, itch (Other)  Bactrim (Flushing)  Robaxin (Nausea; Diarrhea)    Intolerances    Social: Social History:  No cigarette, alcohol or illicit drug use. From NH (20 Jan 2024 22:18)    FH: FAMILY HISTORY:  Family history of pancreatic cancer    Family history of bladder cancer    Family history of lung cancer (Grandparent)        ICU Vital Signs Last 24 Hrs  T(C): 36.6 (31 Jan 2024 16:31), Max: 37.2 (30 Jan 2024 20:00)  T(F): 97.9 (31 Jan 2024 16:31), Max: 98.9 (30 Jan 2024 20:00)  HR: 65 (31 Jan 2024 16:31) (63 - 92)  BP: 106/63 (31 Jan 2024 16:31) (105/68 - 107/71)  BP(mean): --  ABP: --  ABP(mean): --  RR: 18 (31 Jan 2024 16:31) (18 - 20)  SpO2: 97% (31 Jan 2024 16:31) (94% - 100%)    O2 Parameters below as of 31 Jan 2024 16:31  Patient On (Oxygen Delivery Method): nasal cannula  O2 Flow (L/min): 4          ABG - ( 31 Jan 2024 09:20 )  pH, Arterial: 7.510 pH, Blood: x     /  pCO2: 49    /  pO2: 114   / HCO3: 39    / Base Excess: 16.1  /  SaO2: 98.5                I&O's Detail    30 Jan 2024 07:01  -  31 Jan 2024 07:00  --------------------------------------------------------  IN:  Total IN: 0 mL    OUT:    Voided (mL): 1500 mL  Total OUT: 1500 mL    Total NET: -1500 mL            LABS:                        13.3   10.15 )-----------( 153      ( 31 Jan 2024 10:43 )             40.0     01-31    137  |  91<L>  |  51.4<H>  ----------------------------<  257<H>  3.2<L>   |  33.0<H>  |  1.47<H>    Ca    9.2      31 Jan 2024 10:43            CAPILLARY BLOOD GLUCOSE      POCT Blood Glucose.: 285 mg/dL (31 Jan 2024 17:05)      Urinalysis Basic - ( 31 Jan 2024 10:43 )    Color: x / Appearance: x / SG: x / pH: x  Gluc: 257 mg/dL / Ketone: x  / Bili: x / Urobili: x   Blood: x / Protein: x / Nitrite: x   Leuk Esterase: x / RBC: x / WBC x   Sq Epi: x / Non Sq Epi: x / Bacteria: x    Physical Examination:    General: awake, alert, in NAD     HEENT: NC/AT, anicteric, MMM    PULM: Clear to auscultation bilaterally, no accessory muscle use, speaking in full sentences     NECK: Supple, trachea midline    CVS: S1S2, RRR    ABD: Soft, nondistended, nontender, normoactive bowel sounds    EXT: 2+ edema b/l with venous stasis changes, nontender    SKIN: Warm and well perfused    NEURO: Alert, oriented, interactive, nonfocal    ROS: negative except as per HPI     RADIOLOGY:   < from: CT Chest No Cont (01.20.24 @ 20:51) >  INTERPRETATION:  CLINICAL INFORMATION: Heart failure, nonischemic   cardiomyopathy    COMPARISON: 12/26/2023, 12/16/2023    CONTRAST/COMPLICATIONS:  IV Contrast: NONE  Oral Contrast: NONE  Complications: None reported at time of study completion    PROCEDURE:  CT of the Chest was performed.  Sagittal and coronal reformats were performed.    FINDINGS:    LUNGS AND AIRWAYS: Patent central airways.  Decreased right middle lobe   consolidation and atelectasis with remaining subsegmental atelectasis.   Minimal focal groundglass opacity in the superior segment of left lower   lobe (6-33), new since prior.  PLEURA: No pleural effusion.  MEDIASTINUM AND LENIN: No lymphadenopathy.  VESSELS: Within normal limits.  HEART: Heart size is normal. No pericardial effusion.  CHEST WALL AND LOWER NECK: Stable hypodense left thyroid nodule.  VISUALIZED UPPER ABDOMEN: Cholecystectomy clips. Stable right adrenal   nodule.  BONES: No acute abnormality.    IMPRESSION:  Decreased right middle lobe consolidation or atelectasis with remaining   subsegmental atelectasis.  New groundglass focus in the superior segment of left lower lobe-likely   infection. Recommend short-term follow-up.    < end of copied text >     Patient is a 68y old  Female who presents with a chief complaint of covid (31 Jan 2024 11:25)    HPI:  68F never smoker with hx of HFpEF, on home O2 4L, pulmonary HTN with mPA 40 on RHC 12/29, morbid obesity, asthma, CKD3, IDDM2, uterine cancer s/p MEGAN, DVT LLE (2004), chronic leg edema 2/2 venous stasis, spinal stenosis with chronic back pain, recent admission for RSV infection presented 1/20 with chest pain and found to be covid positive found to be in CHF exacerbation and treated for covid PNA and treated for Afib RVR. CT chest with resolving RML consolidation, subsegmental atelectasis and resolving GGO LLL. Pt was ordered for BIPAP while in hospital with intermittent use due to difficulty tolerating the mask. ABG 7.51/49/114 on 4L NC.     Pt reports her shortness of breath is much improved. She was weaned back to her home 4L NC. She last used NIV 2 nights ago. Has been ordered for 12/6 40%. Denies any cough. Denies any fevers/chills. Denies chest pain/wheezing. Reports lower extremity edema improving. Afebrile. Planned for [facility].         PAST MEDICAL & SURGICAL HISTORY:  Diabetes Mellitus Type II      Endometrial Hyperplasia      Cervical Stenosis of Spine      Spinal Stenosis, Lumbar      Deep Vein Thrombosis (DVT)  Left leg, 2004, treated and resolved      Dyslipidemia      Cataract      Morbid Obesity      Vitamin D deficiency      Insomnia      CKD (chronic kidney disease)  ~ III      Congestive heart failure  ~ HFpEF      Uterine cancer      Asthma      On home oxygen therapy      Gait difficulty  ~ u ses walker      Cataract extracted with lens implant 1998  Right      C Section 1994      Cholecystectomy/appendectomy @ age 25      D&C x2 1980's      D&C 2008  hysteroscopy, endometrial hyperplasia, 2009      Cervical Spinal Stenosis surgery x2 (01/2002, 7/2002)      Tonsillectomy as a child      Endometrial biopsy 12/02/09      H/O laser iridotomy  left eye, 2016      H/O colonoscopy  1998      S/P total abdominal hysterectomy and bilateral salpingo-oophorectomy      S/P appendectomy      S/P cholecystectomy    Medications:    buMETAnide 2 milliGRAM(s) Oral daily  metoprolol succinate  milliGRAM(s) Oral daily  metoprolol tartrate Injectable 5 milliGRAM(s) IV Push every 6 hours PRN    albuterol    90 MICROgram(s) HFA Inhaler 2 Puff(s) Inhalation every 6 hours PRN  budesonide  80 MICROgram(s)/formoterol 4.5 MICROgram(s) Inhaler 2 Puff(s) Inhalation two times a day    acetaminophen     Tablet .. 650 milliGRAM(s) Oral every 6 hours PRN  ALPRAZolam 0.25 milliGRAM(s) Oral three times a day PRN  cyclobenzaprine 10 milliGRAM(s) Oral three times a day PRN  melatonin 3 milliGRAM(s) Oral at bedtime PRN  ondansetron Injectable 4 milliGRAM(s) IV Push every 8 hours PRN  oxyCODONE    IR 5 milliGRAM(s) Oral every 4 hours PRN  oxyCODONE  ER Tablet 30 milliGRAM(s) Oral every 12 hours      rivaroxaban 20 milliGRAM(s) Oral with dinner    aluminum hydroxide/magnesium hydroxide/simethicone Suspension 30 milliLiter(s) Oral every 4 hours PRN  famotidine    Tablet 20 milliGRAM(s) Oral daily  lactulose Syrup 20 Gram(s) Oral daily  polyethylene glycol 3350 17 Gram(s) Oral daily  senna 2 Tablet(s) Oral at bedtime      atorvastatin 80 milliGRAM(s) Oral at bedtime  dextrose 50% Injectable 25 Gram(s) IV Push once  dextrose 50% Injectable 12.5 Gram(s) IV Push once  dextrose 50% Injectable 25 Gram(s) IV Push once  dextrose Oral Gel 15 Gram(s) Oral once PRN  glucagon  Injectable 1 milliGRAM(s) IntraMuscular once  insulin glargine Injectable (LANTUS) 45 Unit(s) SubCutaneous every morning  insulin glargine Injectable (LANTUS) 40 Unit(s) SubCutaneous at bedtime  insulin lispro (ADMELOG) corrective regimen sliding scale   SubCutaneous three times a day before meals  insulin lispro Injectable (ADMELOG) 12 Unit(s) SubCutaneous three times a day before meals    dextrose 5%. 1000 milliLiter(s) IV Continuous <Continuous>  dextrose 5%. 1000 milliLiter(s) IV Continuous <Continuous>  multivitamin 1 Tablet(s) Oral daily  potassium chloride   Powder 20 milliEquivalent(s) Oral daily      chlorhexidine 2% Cloths 1 Application(s) Topical <User Schedule>  clotrimazole/betamethasone Cream 1 Application(s) Topical two times a day  lidocaine   4% Patch 1 Patch Transdermal daily    Allergies    adhesives (Rash)  latex (Rash)  wool- rash, itch (Other)  Bactrim (Flushing)  Robaxin (Nausea; Diarrhea)    Intolerances    Social: Social History:  No cigarette, alcohol or illicit drug use. From NH (20 Jan 2024 22:18)    FH: FAMILY HISTORY:  Family history of pancreatic cancer    Family history of bladder cancer    Family history of lung cancer (Grandparent)        ICU Vital Signs Last 24 Hrs  T(C): 36.6 (31 Jan 2024 16:31), Max: 37.2 (30 Jan 2024 20:00)  T(F): 97.9 (31 Jan 2024 16:31), Max: 98.9 (30 Jan 2024 20:00)  HR: 65 (31 Jan 2024 16:31) (63 - 92)  BP: 106/63 (31 Jan 2024 16:31) (105/68 - 107/71)  BP(mean): --  ABP: --  ABP(mean): --  RR: 18 (31 Jan 2024 16:31) (18 - 20)  SpO2: 97% (31 Jan 2024 16:31) (94% - 100%)    O2 Parameters below as of 31 Jan 2024 16:31  Patient On (Oxygen Delivery Method): nasal cannula  O2 Flow (L/min): 4          ABG - ( 31 Jan 2024 09:20 )  pH, Arterial: 7.510 pH, Blood: x     /  pCO2: 49    /  pO2: 114   / HCO3: 39    / Base Excess: 16.1  /  SaO2: 98.5                I&O's Detail    30 Jan 2024 07:01  -  31 Jan 2024 07:00  --------------------------------------------------------  IN:  Total IN: 0 mL    OUT:    Voided (mL): 1500 mL  Total OUT: 1500 mL    Total NET: -1500 mL            LABS:                        13.3   10.15 )-----------( 153      ( 31 Jan 2024 10:43 )             40.0     01-31    137  |  91<L>  |  51.4<H>  ----------------------------<  257<H>  3.2<L>   |  33.0<H>  |  1.47<H>    Ca    9.2      31 Jan 2024 10:43            CAPILLARY BLOOD GLUCOSE      POCT Blood Glucose.: 285 mg/dL (31 Jan 2024 17:05)      Urinalysis Basic - ( 31 Jan 2024 10:43 )    Color: x / Appearance: x / SG: x / pH: x  Gluc: 257 mg/dL / Ketone: x  / Bili: x / Urobili: x   Blood: x / Protein: x / Nitrite: x   Leuk Esterase: x / RBC: x / WBC x   Sq Epi: x / Non Sq Epi: x / Bacteria: x    Physical Examination:    General: awake, alert, in NAD     HEENT: NC/AT, anicteric, MMM    PULM: Clear to auscultation bilaterally, no accessory muscle use, speaking in full sentences     NECK: Supple, trachea midline    CVS: irregular, regular rate     ABD: Soft, nondistended, nontender, normoactive bowel sounds    EXT: 2+ edema b/l with venous stasis changes, nontender    SKIN: Warm and well perfused    NEURO: Alert, oriented, interactive, nonfocal    ROS: negative except as per HPI     RADIOLOGY:   < from: CT Chest No Cont (01.20.24 @ 20:51) >  INTERPRETATION:  CLINICAL INFORMATION: Heart failure, nonischemic   cardiomyopathy    COMPARISON: 12/26/2023, 12/16/2023    CONTRAST/COMPLICATIONS:  IV Contrast: NONE  Oral Contrast: NONE  Complications: None reported at time of study completion    PROCEDURE:  CT of the Chest was performed.  Sagittal and coronal reformats were performed.    FINDINGS:    LUNGS AND AIRWAYS: Patent central airways.  Decreased right middle lobe   consolidation and atelectasis with remaining subsegmental atelectasis.   Minimal focal groundglass opacity in the superior segment of left lower   lobe (6-33), new since prior.  PLEURA: No pleural effusion.  MEDIASTINUM AND LENIN: No lymphadenopathy.  VESSELS: Within normal limits.  HEART: Heart size is normal. No pericardial effusion.  CHEST WALL AND LOWER NECK: Stable hypodense left thyroid nodule.  VISUALIZED UPPER ABDOMEN: Cholecystectomy clips. Stable right adrenal   nodule.  BONES: No acute abnormality.    IMPRESSION:  Decreased right middle lobe consolidation or atelectasis with remaining   subsegmental atelectasis.  New groundglass focus in the superior segment of left lower lobe-likely   infection. Recommend short-term follow-up.    < end of copied text >

## 2024-01-31 NOTE — CHART NOTE - NSCHARTNOTEFT_GEN_A_CORE
Source: Patient [ ]  Family [ ]   other [ x]EMR    Current Diet: Dash, consistent CHO, 1500cc fluid restriction    Patient reports [ ] nausea  [ ] vomiting [ ] diarrhea [ ] constipation  [ ]chewing problems [ ] swallowing issues  [ ] other:     PO intake:  < 50% [ ]   50-75%  [ x]   %  [ x]  other :    Source for PO intake [ ] Patient [ ] family [x ] chart [ ] staff [ ] other    Enteral /Parenteral Nutrition:     Current Weight: 356# 1/20  generalized non pitting and 3+ b/l ankle, leg and foot    % Weight Change     Pertinent Medications: MEDICATIONS  (STANDING):  atorvastatin 80 milliGRAM(s) Oral at bedtime  budesonide  80 MICROgram(s)/formoterol 4.5 MICROgram(s) Inhaler 2 Puff(s) Inhalation two times a day  buMETAnide 2 milliGRAM(s) Oral daily  chlorhexidine 2% Cloths 1 Application(s) Topical <User Schedule>  clotrimazole/betamethasone Cream 1 Application(s) Topical two times a day  dextrose 5%. 1000 milliLiter(s) (50 mL/Hr) IV Continuous <Continuous>  dextrose 5%. 1000 milliLiter(s) (100 mL/Hr) IV Continuous <Continuous>  dextrose 50% Injectable 25 Gram(s) IV Push once  dextrose 50% Injectable 12.5 Gram(s) IV Push once  dextrose 50% Injectable 25 Gram(s) IV Push once  famotidine    Tablet 20 milliGRAM(s) Oral daily  glucagon  Injectable 1 milliGRAM(s) IntraMuscular once  insulin glargine Injectable (LANTUS) 45 Unit(s) SubCutaneous every morning  insulin glargine Injectable (LANTUS) 40 Unit(s) SubCutaneous at bedtime  insulin lispro (ADMELOG) corrective regimen sliding scale   SubCutaneous three times a day before meals  insulin lispro Injectable (ADMELOG) 12 Unit(s) SubCutaneous three times a day before meals  lactulose Syrup 20 Gram(s) Oral daily  lidocaine   4% Patch 1 Patch Transdermal daily  metoprolol succinate  milliGRAM(s) Oral daily  multivitamin 1 Tablet(s) Oral daily  oxyCODONE  ER Tablet 30 milliGRAM(s) Oral every 12 hours  polyethylene glycol 3350 17 Gram(s) Oral daily  potassium chloride   Powder 40 milliEquivalent(s) Oral once  rivaroxaban 20 milliGRAM(s) Oral with dinner  senna 2 Tablet(s) Oral at bedtime    MEDICATIONS  (PRN):  acetaminophen     Tablet .. 650 milliGRAM(s) Oral every 6 hours PRN Temp greater or equal to 38C (100.4F), Mild Pain (1 - 3)  albuterol    90 MICROgram(s) HFA Inhaler 2 Puff(s) Inhalation every 6 hours PRN Bronchospasm  ALPRAZolam 0.25 milliGRAM(s) Oral three times a day PRN anxiety  aluminum hydroxide/magnesium hydroxide/simethicone Suspension 30 milliLiter(s) Oral every 4 hours PRN Dyspepsia  cyclobenzaprine 10 milliGRAM(s) Oral three times a day PRN Muscle Spasm  dextrose Oral Gel 15 Gram(s) Oral once PRN Blood Glucose LESS THAN 70 milliGRAM(s)/deciliter  melatonin 3 milliGRAM(s) Oral at bedtime PRN Insomnia  metoprolol tartrate Injectable 5 milliGRAM(s) IV Push every 6 hours PRN for heart rate above 110 bpm  ondansetron Injectable 4 milliGRAM(s) IV Push every 8 hours PRN Nausea and/or Vomiting  oxyCODONE    IR 5 milliGRAM(s) Oral every 4 hours PRN Moderate Pain (4 - 6)    Pertinent Labs:         Skin: Stage 2 sacrum    Nutrition focused physical exam not conducted - found signs of malnutrition [ ]absent [ ]present    Subcutaneous fat loss: [ ] Orbital fat pads region, [ ]Buccal fat region, [ ]Triceps region,  [ ]Ribs region    Muscle wasting: [ ]Temples region, [ ]Clavicle region, [ ]Shoulder region, [ ]Scapula region, [ ]Interosseous region,  [ ]thigh region, [ ]Calf region    Estimated Needs:   [x ] no change since previous assessment  [ ] recalculated:     Current Nutrition Diagnosis: Increased Nutrient Needs...  related to increased physiological demand to promote healing  as evidenced by stage 2 sacrum, CHF, covid pneumonia. Awaiting Auth for [facility].       Recommendations:   Continue MVI  Encourage diet compliance  D/C fluid restriction as medically feasible    Monitoring and Evaluation:   [x ] PO intake [ x] Tolerance to diet prescription [X] Weights  [X] Follow up per protocol [X] Labs:
Called by RN w/ pt's FS of 497, confirmed.  Pt on steroids; asymptomatic; in no distress; received ordered 35U of Lantus.  13U of Admelog ordered stat w/ BMP.  RN to monitor and escalate prn.
Pt with BP 94/51  All remainder of vital signs stable  Resting comfortably in NAD  Denies chest pain, SOB, palpitations, HA, n/v/d/c, abdominal pain, dizziness, blurry vision  500cc NS bolus ordered  RN to notify of any acute change in pt status

## 2024-01-31 NOTE — DISCHARGE NOTE NURSING/CASE MANAGEMENT/SOCIAL WORK - NSDCPEPTCAREGIVEDUMATLIST _GEN_ALL_CORE
Heart Failure/Diabetes/Coronavirus/COVID19 Heart Failure/Diabetes/Influenza Vaccination/Rivaroxaban/Xarelto/Coronavirus/COVID19

## 2024-02-01 VITALS
SYSTOLIC BLOOD PRESSURE: 91 MMHG | HEART RATE: 87 BPM | OXYGEN SATURATION: 99 % | TEMPERATURE: 98 F | RESPIRATION RATE: 18 BRPM | DIASTOLIC BLOOD PRESSURE: 55 MMHG

## 2024-02-01 LAB
ANION GAP SERPL CALC-SCNC: 16 MMOL/L — SIGNIFICANT CHANGE UP (ref 5–17)
BUN SERPL-MCNC: 48.5 MG/DL — HIGH (ref 8–20)
CALCIUM SERPL-MCNC: 9.4 MG/DL — SIGNIFICANT CHANGE UP (ref 8.4–10.5)
CHLORIDE SERPL-SCNC: 93 MMOL/L — LOW (ref 96–108)
CO2 SERPL-SCNC: 30 MMOL/L — HIGH (ref 22–29)
CREAT SERPL-MCNC: 1.53 MG/DL — HIGH (ref 0.5–1.3)
EGFR: 37 ML/MIN/1.73M2 — LOW
GLUCOSE BLDC GLUCOMTR-MCNC: 192 MG/DL — HIGH (ref 70–99)
GLUCOSE BLDC GLUCOMTR-MCNC: 235 MG/DL — HIGH (ref 70–99)
GLUCOSE BLDC GLUCOMTR-MCNC: 266 MG/DL — HIGH (ref 70–99)
GLUCOSE SERPL-MCNC: 207 MG/DL — HIGH (ref 70–99)
HCT VFR BLD CALC: 35.9 % — SIGNIFICANT CHANGE UP (ref 34.5–45)
HCT VFR BLD CALC: 44.5 % — SIGNIFICANT CHANGE UP (ref 34.5–45)
HGB BLD-MCNC: 11.3 G/DL — LOW (ref 11.5–15.5)
HGB BLD-MCNC: 13.9 G/DL — SIGNIFICANT CHANGE UP (ref 11.5–15.5)
MAGNESIUM SERPL-MCNC: 2 MG/DL — SIGNIFICANT CHANGE UP (ref 1.6–2.6)
MCHC RBC-ENTMCNC: 29.1 PG — SIGNIFICANT CHANGE UP (ref 27–34)
MCHC RBC-ENTMCNC: 31.5 GM/DL — LOW (ref 32–36)
MCV RBC AUTO: 92.5 FL — SIGNIFICANT CHANGE UP (ref 80–100)
PHOSPHATE SERPL-MCNC: 3.3 MG/DL — SIGNIFICANT CHANGE UP (ref 2.4–4.7)
PLATELET # BLD AUTO: 143 K/UL — LOW (ref 150–400)
POTASSIUM SERPL-MCNC: 3.4 MMOL/L — LOW (ref 3.5–5.3)
POTASSIUM SERPL-SCNC: 3.4 MMOL/L — LOW (ref 3.5–5.3)
RBC # BLD: 3.88 M/UL — SIGNIFICANT CHANGE UP (ref 3.8–5.2)
RBC # FLD: 14.1 % — SIGNIFICANT CHANGE UP (ref 10.3–14.5)
SODIUM SERPL-SCNC: 139 MMOL/L — SIGNIFICANT CHANGE UP (ref 135–145)
WBC # BLD: 9.84 K/UL — SIGNIFICANT CHANGE UP (ref 3.8–10.5)
WBC # FLD AUTO: 9.84 K/UL — SIGNIFICANT CHANGE UP (ref 3.8–10.5)

## 2024-02-01 PROCEDURE — 94660 CPAP INITIATION&MGMT: CPT

## 2024-02-01 PROCEDURE — 36600 WITHDRAWAL OF ARTERIAL BLOOD: CPT

## 2024-02-01 PROCEDURE — 80053 COMPREHEN METABOLIC PANEL: CPT

## 2024-02-01 PROCEDURE — 94760 N-INVAS EAR/PLS OXIMETRY 1: CPT

## 2024-02-01 PROCEDURE — 84100 ASSAY OF PHOSPHORUS: CPT

## 2024-02-01 PROCEDURE — 87637 SARSCOV2&INF A&B&RSV AMP PRB: CPT

## 2024-02-01 PROCEDURE — 84484 ASSAY OF TROPONIN QUANT: CPT

## 2024-02-01 PROCEDURE — 84295 ASSAY OF SERUM SODIUM: CPT

## 2024-02-01 PROCEDURE — 85027 COMPLETE CBC AUTOMATED: CPT

## 2024-02-01 PROCEDURE — 93005 ELECTROCARDIOGRAM TRACING: CPT

## 2024-02-01 PROCEDURE — 82947 ASSAY GLUCOSE BLOOD QUANT: CPT

## 2024-02-01 PROCEDURE — 82962 GLUCOSE BLOOD TEST: CPT

## 2024-02-01 PROCEDURE — 71045 X-RAY EXAM CHEST 1 VIEW: CPT

## 2024-02-01 PROCEDURE — 87640 STAPH A DNA AMP PROBE: CPT

## 2024-02-01 PROCEDURE — 84132 ASSAY OF SERUM POTASSIUM: CPT

## 2024-02-01 PROCEDURE — 82435 ASSAY OF BLOOD CHLORIDE: CPT

## 2024-02-01 PROCEDURE — 83880 ASSAY OF NATRIURETIC PEPTIDE: CPT

## 2024-02-01 PROCEDURE — 36415 COLL VENOUS BLD VENIPUNCTURE: CPT

## 2024-02-01 PROCEDURE — 83605 ASSAY OF LACTIC ACID: CPT

## 2024-02-01 PROCEDURE — 71045 X-RAY EXAM CHEST 1 VIEW: CPT | Mod: 26

## 2024-02-01 PROCEDURE — 71250 CT THORAX DX C-: CPT | Mod: MA

## 2024-02-01 PROCEDURE — 70450 CT HEAD/BRAIN W/O DYE: CPT

## 2024-02-01 PROCEDURE — 82330 ASSAY OF CALCIUM: CPT

## 2024-02-01 PROCEDURE — 87641 MR-STAPH DNA AMP PROBE: CPT

## 2024-02-01 PROCEDURE — 80048 BASIC METABOLIC PNL TOTAL CA: CPT

## 2024-02-01 PROCEDURE — 85018 HEMOGLOBIN: CPT

## 2024-02-01 PROCEDURE — 82803 BLOOD GASES ANY COMBINATION: CPT

## 2024-02-01 PROCEDURE — 99285 EMERGENCY DEPT VISIT HI MDM: CPT

## 2024-02-01 PROCEDURE — 85379 FIBRIN DEGRADATION QUANT: CPT

## 2024-02-01 PROCEDURE — 83735 ASSAY OF MAGNESIUM: CPT

## 2024-02-01 PROCEDURE — 94640 AIRWAY INHALATION TREATMENT: CPT

## 2024-02-01 PROCEDURE — 85025 COMPLETE CBC W/AUTO DIFF WBC: CPT

## 2024-02-01 PROCEDURE — 99239 HOSP IP/OBS DSCHRG MGMT >30: CPT

## 2024-02-01 PROCEDURE — 85014 HEMATOCRIT: CPT

## 2024-02-01 RX ORDER — POTASSIUM CHLORIDE 20 MEQ
1 PACKET (EA) ORAL
Qty: 0 | Refills: 0 | DISCHARGE
Start: 2024-02-01

## 2024-02-01 RX ORDER — METOPROLOL TARTRATE 50 MG
50 TABLET ORAL
Refills: 0 | Status: DISCONTINUED | OUTPATIENT
Start: 2024-02-01 | End: 2024-02-01

## 2024-02-01 RX ORDER — POTASSIUM CHLORIDE 20 MEQ
20 PACKET (EA) ORAL ONCE
Refills: 0 | Status: COMPLETED | OUTPATIENT
Start: 2024-02-01 | End: 2024-02-01

## 2024-02-01 RX ORDER — METOPROLOL TARTRATE 50 MG
1 TABLET ORAL
Qty: 0 | Refills: 0 | DISCHARGE
Start: 2024-02-01

## 2024-02-01 RX ORDER — LACTULOSE 10 G/15ML
20 SOLUTION ORAL ONCE
Refills: 0 | Status: COMPLETED | OUTPATIENT
Start: 2024-02-01 | End: 2024-02-01

## 2024-02-01 RX ORDER — POTASSIUM CHLORIDE 20 MEQ
40 PACKET (EA) ORAL ONCE
Refills: 0 | Status: COMPLETED | OUTPATIENT
Start: 2024-02-01 | End: 2024-02-01

## 2024-02-01 RX ORDER — RIVAROXABAN 15 MG-20MG
10 KIT ORAL
Refills: 0 | Status: DISCONTINUED | OUTPATIENT
Start: 2024-02-01 | End: 2024-02-01

## 2024-02-01 RX ORDER — ALBUTEROL 90 UG/1
2 AEROSOL, METERED ORAL
Qty: 0 | Refills: 0 | DISCHARGE
Start: 2024-02-01

## 2024-02-01 RX ORDER — BUMETANIDE 0.25 MG/ML
1 INJECTION INTRAMUSCULAR; INTRAVENOUS
Qty: 0 | Refills: 0 | DISCHARGE
Start: 2024-02-01

## 2024-02-01 RX ORDER — LACTULOSE 10 G/15ML
30 SOLUTION ORAL
Qty: 0 | Refills: 0 | DISCHARGE
Start: 2024-02-01

## 2024-02-01 RX ADMIN — Medication 7: at 13:45

## 2024-02-01 RX ADMIN — Medication 12 UNIT(S): at 09:10

## 2024-02-01 RX ADMIN — Medication 40 MILLIEQUIVALENT(S): at 09:26

## 2024-02-01 RX ADMIN — FAMOTIDINE 20 MILLIGRAM(S): 10 INJECTION INTRAVENOUS at 13:40

## 2024-02-01 RX ADMIN — Medication 0.25 MILLIGRAM(S): at 09:26

## 2024-02-01 RX ADMIN — INSULIN GLARGINE 45 UNIT(S): 100 INJECTION, SOLUTION SUBCUTANEOUS at 09:19

## 2024-02-01 RX ADMIN — CLOTRIMAZOLE AND BETAMETHASONE DIPROPIONATE 1 APPLICATION(S): 10; .5 CREAM TOPICAL at 05:12

## 2024-02-01 RX ADMIN — BUMETANIDE 2 MILLIGRAM(S): 0.25 INJECTION INTRAMUSCULAR; INTRAVENOUS at 05:10

## 2024-02-01 RX ADMIN — OXYCODONE HYDROCHLORIDE 30 MILLIGRAM(S): 5 TABLET ORAL at 18:46

## 2024-02-01 RX ADMIN — Medication 3: at 09:10

## 2024-02-01 RX ADMIN — POLYETHYLENE GLYCOL 3350 17 GRAM(S): 17 POWDER, FOR SOLUTION ORAL at 13:39

## 2024-02-01 RX ADMIN — Medication 12 UNIT(S): at 13:45

## 2024-02-01 RX ADMIN — Medication 5: at 18:56

## 2024-02-01 RX ADMIN — Medication 650 MILLIGRAM(S): at 05:10

## 2024-02-01 RX ADMIN — CHLORHEXIDINE GLUCONATE 1 APPLICATION(S): 213 SOLUTION TOPICAL at 05:13

## 2024-02-01 RX ADMIN — Medication 1 TABLET(S): at 13:40

## 2024-02-01 RX ADMIN — Medication 40 MILLIEQUIVALENT(S): at 18:48

## 2024-02-01 RX ADMIN — Medication 650 MILLIGRAM(S): at 06:10

## 2024-02-01 RX ADMIN — Medication 12 UNIT(S): at 18:56

## 2024-02-01 RX ADMIN — OXYCODONE HYDROCHLORIDE 5 MILLIGRAM(S): 5 TABLET ORAL at 00:03

## 2024-02-01 RX ADMIN — Medication 20 MILLIEQUIVALENT(S): at 13:39

## 2024-02-01 RX ADMIN — RIVAROXABAN 10 MILLIGRAM(S): KIT at 18:47

## 2024-03-24 NOTE — PHYSICAL THERAPY INITIAL EVALUATION ADULT - ASSISTIVE DEVICE FOR TRANSFER: STAND/SIT, REHAB EVAL
Emergency Department TeleTriage Encounter Note      CHIEF COMPLAINT    Chief Complaint   Patient presents with    Wrist Pain     Pt c/o of right wrist pain after someone stepped on it while playing soccer yesterday. Bruising noted.        VITAL SIGNS   Initial Vitals [03/24/24 1400]   BP Pulse Resp Temp SpO2   107/66 76 18 98.4 °F (36.9 °C) 99 %      MAP       --            ALLERGIES    Review of patient's allergies indicates:  No Known Allergies    PROVIDER TRIAGE NOTE  This is a teletriage evaluation of a 14 y.o. female presenting to the ED complaining of right wrist pain after being stepped on while playing soccer yesterday.     Alert, no distress.     Initial orders will be placed and care will be transferred to an alternate provider when patient is roomed for a full evaluation. Any additional orders and the final disposition will be determined by that provider.         ORDERS  Labs Reviewed   POCT URINE PREGNANCY       ED Orders (720h ago, onward)      Start Ordered     Status Ordering Provider    03/24/24 1415 03/24/24 1404  acetaminophen tablet 500 mg  ED 1 Time         Ordered [facility] SHORT N.    03/24/24 1405 03/24/24 1404  POCT urine pregnancy  Once         Ordered [facility] SHORT N.    03/24/24 1404 03/24/24 1404  X-Ray Wrist Complete Right  1 time imaging         Ordered [facility] SHORT              Virtual Visit Note: The provider triage portion of this emergency department evaluation and documentation was performed via [employer], a HIPAA-compliant telemedicine application, in concert with a tele-presenter in the room. A face to face patient evaluation with one of my colleagues will occur once the patient is placed in an emergency department room.      DISCLAIMER: This note was prepared with [employer] voice recognition transcription software. Garbled syntax, mangled pronouns, and other bizarre constructions may be attributed to that software system.     rolling walker

## 2024-04-17 NOTE — DISCHARGE NOTE NURSING/CASE MANAGEMENT/SOCIAL WORK - NSDCPEFALRISK_GEN_ALL_CORE
[FreeTextEntry1] : Ms. FERNANDO FLOYD is a 49 year old female with past medical history of breast cancer which was treated with implant reconstruction who presents now s/p b/l breast implant removal, capsulectomy, flat closure 2/28/24.   Doing well  Denies fever, chills, LE pain/edema  She feels like the central part of her surgical area still has some excess skin making it difficult to wear clothes, she also has excess tissue under her right arm pit area making it hard to put her arms down  For information on Fall & Injury Prevention, visit: [email or web address] OR  [email or web address] OR  https://www.cdc.gov/steadi/patient.html

## 2024-05-11 NOTE — DIETITIAN INITIAL EVALUATION ADULT - PROBLEM/PLAN-3
Increase your Coreg to 25 mg twice a day.  A prescription has been sent to your pharmacy.  Cardiology will follow you up for your chest pain.  
DISPLAY PLAN FREE TEXT

## 2024-06-10 NOTE — ED ADULT NURSE NOTE - NSFALLRSKOUTCOME_ED_ALL_ED
Anesthesia Post-op Note    Patient: Christelle Gaona  Procedure(s) Performed: COLONOSCOPY   Anesthesia type: MAC    Vitals Value Taken Time   Temp 36.7 °C (98 °F) 06/10/24 1400   Pulse 76 06/10/24 1425   Resp 19 06/10/24 1425   SpO2 100 % 06/10/24 1425   /66 06/10/24 1425         Patient Location: Phase II  Post-op Vital Signs:stable  Level of Consciousness: participates in exam and awake  Respiratory Status: spontaneous ventilation  Cardiovascular stable  Hydration: euvolemic  Pain Management: adequately controlled  Handoff: Handoff to receiving clinician was performed and questions were answered  Vomiting: none  Nausea: None  Airway Patency:patent  Post-op Assessment: no complications and patient tolerated procedure well      There were no known notable events for this encounter.                      
Fall with Harm Risk

## 2024-06-12 NOTE — ED ADULT NURSE NOTE - BREATH SOUNDS, MLM
"Presents to ED with increasing weakness and shakes with slight confusion for the last 3 to 4 days  Has history of known hepatic cirrhosis, states she was off lactulose for a week  Reviewed recent discharge diagnosis -was instructed to continue taking lactulose    CT head revealing \" no acute intercranial abnormality\"  Ammonia 159  LFTs  -AST mildly elevated, bilirubin mildly elevated  INR 1.7  Pancytopenia stable    Lactulose 30 mg given x 1 - continue 4 times daily 20 mg, titrate for 3-4 bowel movements a day  Aspiration precautions  Intake and output with BM counts to assist with lactulose titration  Avoid any sedatives or hepatotoxic medications if at all possible  No ascites noted on CT abdomen pelvis but noted to have large stool burden.  Patient also is on chronic narcotic regimen which probably makes her more constipated than usual.  Continue Xifaxan as per home regimen    " Wheezes/Rales/Rhonchi

## 2024-07-16 NOTE — ED PROVIDER NOTE - CONSTITUTIONAL, MLM
Patient needs a refill on:    Norgestim-Eth Estrad Triphasic (TRI-SPRINTEC) 0.18/0.215/0.25 MG-35 MCG Oral Tab 84 tablet 3 3/2/2023 --   Sig:   Take 1 tablet by mouth daily.     Route:   Oral     Order #:   [id number]       Additional Order Information      [id number] [place] [facility] [place] [address] AT [facility] [address], [phone], [phone]           normal... Well appearing, awake, alert, oriented to person, place, time/situation and in no apparent distress.

## 2024-07-23 NOTE — ED ADULT TRIAGE NOTE - NS ED TRIAGE AVPU SCALE
Subjective:      Patient ID: Shirley Sanchez is a 51 y.o. female.    Annual physical    Denies fever, chills, sweats, chills, shortness of breath, or chest pain.  Patient stopped glimepiride as instructed and low sugars reading only happened 1 time since then, for the last 2 months.  She will continue Ozempic 1 mg weekly. Not to skip meals.  Continue monitoring blood sugars during the day and night.  If further hypoglycemic events happen, will reduce Ozempic as well.  A1c POC 6.3  Refilled medications for ADHD.  Only taking Monday to Friday while working and on weekend if needed up to 25 days a month.  Checking TSH, levothyroxine sent to pharmacy.  Will adjust if required  Depression anxiety controlled on Lexapro.   Neuropathy pain controlled on gabapentin.  Continue treatment.  Noted nonhealing skin lesion for the last 3 years 1 cm diameter in the left forearm.  Referral to dermatology  Information provided to patient she will make appointment.  No other complaints or concerns.  Follow-up in 3 months.        ADHD adult  Amphet-Dextroamphet 3-Bead ER (MYDAYIS) 50 MG CP24, daily.  Only taking Monday to Friday while working and on weekend if needed up to 25 days a month.    Asthma mild intermittent  albuterol (PROVENTIL) (2.5 MG/3ML) 0.083% nebulizer solution, as needed  albuterol sulfate HFA as needed.  fluticasone (FLOVENT HFA) 44 MCG/ACT inhaler, as needed.    Vitamin D deficiency  vitamin D3 (CHOLECALCIFEROL) 125 MCG (5000 UT) daily.    Diabetes mellitus type 2  STOPPED due to low sugar readings-glimepiride (AMARYL) 1 MG tablet, daily.  OZEMPIC, 1 MG/DOSE, 4 MG/3ML SOPN, weekly.    Diabetic neuropathy  gabapentin (NEURONTIN) 100 MG capsule, daily.    Allergic rhinitis  fluticasone (FLONASE) 50 MCG/ACT nasal spray, as needed.  STOPPED - no need to use montelukast (SINGULAIR) 10 MG tablet, daily.    Depression and anxiety  escitalopram (LEXAPRO) 20 MG tablet, daily.    Chronic joint pain/hands  Stopped using 
Alert-The patient is alert, awake and responds to voice. The patient is oriented to time, place, and person. The triage nurse is able to obtain subjective information.

## 2024-08-21 ENCOUNTER — NON-APPOINTMENT (OUTPATIENT)
Age: 69
End: 2024-08-21

## 2024-08-26 NOTE — DIETITIAN INITIAL EVALUATION ADULT - OTHER INFO
"Care Transitions focused note:      Patient presented to visitors desk after being picked up hitchhiking.   felt he needed to come to the hospital as he had medical questions.    Appt obtained with PCP at the clinic this am at 8:30   Patient will then return to [facility].    Pt also making statements that there is another patient with a black eye. He feels it's an \"abusive environment\" and he is getting the wrong medications.    MAARC form filed     Web Report number [id number]  "
69yo F w/ Obesity, HTN, HLD, DM2, HFpEF, CKD III, DVT, Uterine CA, suspected PE, sev pUlm HTN, was discharged from [facility] with recs of ROSE. Pt declined, went home, fell and returned to our ED.  now awaits ROSE.

## 2024-10-15 NOTE — PHYSICAL THERAPY INITIAL EVALUATION ADULT - GAIT DISTANCE, PT EVAL
[facility]   Sleep Medicine     Office Progress Note - Initial Evaluation        [address]  [address]  [place] [address] [phone] Fax    Reason for visit/referral: Evaluation for ongoing care of obstructive sleep apnea/sleep disordered breathing  Assessment:      1. DOREEN (obstructive sleep apnea)  Assessment & Plan:   Overall, uses her CPAP consistently more than 80% of the time.  However, some nights the usage is less than 4 hours.  Overall her settings appear to be appropriate.  Some nights she falls asleep without using her machine and then wakes up in the middle of the night and puts her mask on and turns the machine on.    She also does travel occasionally and does not bring her machine because her obstructive sleep apnea is mild.    I did tell her that, while not knowing how accurate this is, her obstructive sleep apnea was mild but her O2 [place] on her sleep study was 65% and she spent 14 minutes below 88%.  That may be reason to continue treatment regardless.  She certainly is not sleepy during the day and continues to be followed for hypertension and anemia and chronic kidney disease.  Orders:  -     DME - DURABLE MEDICAL EQUIPMENT  2. Stage 3 chronic kidney disease, unspecified whether stage 3a or 3b CKD (HCC)  3. Refractory anemia (HCC)  Assessment & Plan:   Last H/H 9.5 and 30.7 respectively. Managed by sub specialist.  4. Essential (primary) hypertension  Assessment & Plan:   Chronic, at goal (stable), continue current treatment plan       Ms. Deleon is a 66-year-old female with a history of mild obstructive sleep apnea that was diagnosed in 2020.  She has had her current CPAP machine since November 2021.    Since she started using her CPAP machine, she no longer is happening hypnagogic hallucinations or sleep paralysis.  She noticed that she would have sleep paralysis if she took a nap during the day and did not use her CPAP so she does not do  5 steps toward head of bed

## 2024-11-18 NOTE — ED PROVIDER NOTE - EKG ADDITIONAL QUESTION - PERFORMED INDEPENDENT VISUALIZATION
Health Maintenance       Hepatitis B Vaccine (1 of 3 - 19+ 3-dose series)  Never done    COVID-19 Vaccine (5 - 2024-25 season)  Overdue since 9/1/2024           Following review of the above:  Patient is not proceeding with: COVID-19 and Hep B    Note: Refer to final orders and clinician documentation.      '  
Yes

## 2025-03-17 NOTE — ED ADULT TRIAGE NOTE - NS ED NURSE DIRECT TO ROOM YN
1.  Okay to discharge home this evening   2.  Patient has pain meds at home   3.  Patient should participate in a SCR (superior capsular reconstruction) protocol   My office is aware   4.  Patient should see us back in our office in 3 to 5 days   5.  Keep patient's arm and shoulder immobilized until follow-up   6.  Will change all dressings in 3 to 5 days at the office   7.  Patient to be encouraged to sleep in a recliner   8.  Patient to be working on foot pumps and be up mobilizing every hour in the hour   Patient be doing foot to be foot pumps and please educate what foot pumps are   9.  Patient should take an aspirin 325 mg 1 tablet twice daily for DVT prophylaxis       Post-Surgery Checklist    Follow up care is an important part of a successful surgery.  Your surgeon needs to see that you are healing and recovering well.  Please make sure to call and schedule your first follow-up visit with your surgeon.    Call your surgeon if you have any questions specific to your recovery.  Here is what you can do at home:    Pain Management.  Take your pain medication as directed, before the pain becomes too severe.  It may not work as well, if you wait too long between doses.  Pain medication may upset your stomach.  You can take a small amount of food to help with it.  Ask your healthcare provider of other ways to control pain, which may be heat, ice, or relaxation.    If your healthcare provider prescribes over the counter (OTC) medication, like acetaminophen, then ask how much you should take each day and verify that the medication will not interact with any other prescribed medication.  You can overdose on these medications, especially in combination with other medications.  Drinking alcohol and taking pain medication can cause dizziness and slow your breathing.  It can even be deadly.  Do NOT drink alcohol while taking pain medication.  Constipation. Constipation is a common side effect of pain medicine.  Drinking  plenty of fluids and eating fruits and vegetables high in fiber can also help.  Call your healthcare provider before taking any medications such as laxatives or stool softeners to ease the constipation.  Remember to call your surgeon prior to taking any laxatives.  Preventing blood clots and pneumonia.  Blood clots and pneumonia can still occur during the recovery period.  Follow the discharge instructions that you were given by your surgeon.  Your surgeon may want you to resume regular activity/exercises or wear compression stockings to prevent any blood clots.  Also, make sure to keep up with deep breathing and coughing exercises you learned in the hospital to prevent pneumonia.    Incision care. Follow instructions that you were given by your surgeon or in the hospital regarding when it's safe to shower.  Until then, keep the incision dry.  Watch for signs of infection, which include redness, swelling, drainage from incision, fever (100.4   F or higher), or as directed by your health care provider. (Please see section on Preventing Surgical Site Infections for more information)  Activity.  At your follow-up visit, ask your surgeon when it's safe to return to your regular activities.  Slowly get back to exercise and other activities.    Returning to work.  Going back to work depends on your surgery and your type of job.  You and your surgeon will decide when you can return to work.  Driving.  Do NOT drive until the surgeon tells you that you can.  Do NOT drive or operate any heavy machinery for 24 hours after anesthesia and while you are still taking pain medication because it can make you react more slowly to things.  Do not make important decisions.  For the first 24 hours after anesthesia, do NOT make any important decisions, like signing legal documents.    Get good nutrition.  Healthy eating helps your body heal.  If you had a special diet before surgery, ask your surgeon if you should stay on the same  diet.  Nausea. Some people may experience nausea and vomiting after surgery, which may be due to anesthesia, pain medication, or the stress of surgery.  Please remember to not push yourself to eat.  Your body will tell you when to eat and how much.  Follow these suggested tips to help manage nausea:  Start with clear liquids and soup  Advance to semi-solid foods like mashed potatoes, gelatin, and applesauce.    Slowly move to solid foods, but don't eat fatty, rich, or spicy foods at first.    You can try smaller meals more frequently.  Follow nutrition directions provided by surgeon.  If you have obstructive sleep apnea.  You were given anesthesia and pain medicine during surgery to keep you comfortable and your pain controlled.  After surgery, you may experience more apnea spells or longer apnea spells due to the medication that you were given.  At home:  Keep using the continuous positive airway pressure (CPAP) device when you sleep.  Unless your healthcare provider tells you not to, use it when you sleep, day or night.    Talk with your healthcare provider before taking any pain medicine, muscle relaxants, or sedatives.  Your provider will tell you about the possible dangers of taking these medications.      When to call your healthcare provider    Call your surgeon right away if you have any of the following:    Pain, swelling, and redness in your leg or arm  Increased pain or pain that is not relieved with medications  Feel too sleepy, dizzy, or groggy  Drainage, redness, bleeding, or swelling around the incision  Incision opens up (cover it with a clean dressing or cloth)  Severe nausea or vomiting  Fever of 100.4   F (38  C) or higher  Skin changes, such as a rash, itching, or hives, which may mean an allergic reaction (if any facial swelling or trouble breathing, call 911 right away)      Call 911    Call 911 if you have the following symptoms:    Chest Pain  Trouble Breathing  Fast  Heartbeat  Fainting  Heavy or Uncontrolled Bleeding    © 2519-0453 [employer]. [address] [place] [address]. All rights reserved. This information is not intended as a substitute for professional medical care. Always follow your healthcare professional's instructions.  © 4458-4429 [employer] [address] [place] [address]. All rights reserved. This information is not intended as a substitute for professional medical care. Always follow your healthcare professional's instructions.          Want to Say “Thank You” to a Nurse?  The SAMANTHA Award® was created in memory of ELIZABETH Myers by his family to say thank you to nurses who provide an outstanding level of care.    Submit a nomination using any method below.     OR    [email or web address]  Or visit the Resource section   on your [employer] cesar       Yes

## 2025-03-19 NOTE — PHYSICAL THERAPY INITIAL EVALUATION ADULT - GENERAL OBSERVATIONS, REHAB EVAL
Chart reviewed. Pt myra 45 mins PT eval; recd semi supine in bed, NAD, VSS, A/Ox4,
no known allergies

## 2025-03-20 NOTE — PATIENT PROFILE ADULT - FALL HARM RISK CONCLUSION
Fall with Harm Risk will continue to follow for goc/ symptoms  case discussed with primary team  please page palliative team if symptoms unmanaged   Can be reached by TEAMS M-F 9-5 Ashleigh Dozier Any other time please page [phone] if needed

## 2025-04-15 NOTE — DISCHARGE NOTE NURSING/CASE MANAGEMENT/SOCIAL WORK - CASE MANAGER'S NAME
NEPHROLOGY HOSPITAL PROGRESS NOTE   Naresh Carpio 64 y.o. male MRN: [id number]  Unit/Bed#: -01 Encounter: [id number]  Reason for Consult: ESRD on HD  Assessment & Plan  ESRD (end stage renal disease) ([facility])  [facility]  Access: Right IJ permacath  EDW 99 kg, last HD postweight 97 kg  HD today  Primary hypertension  Blood pressure acceptable  Continue nifedipine 30 mg daily  Anemia due to chronic kidney disease, on chronic dialysis (HCC)  Hemoglobin today 7.2  Continue SURINDER with dialysis (increased to 8000 units)  He was previously not on SURINDER  Transfuse to keep more than 7  Hyperphosphatemia  Phosphorus level today 4.6 at goal  Continue sevelamer with meals  Chronic diastolic (congestive) heart failure (HCC)  Ultrafiltration as tolerated on HD  Traumatic retroperitoneal hematoma  Status post IR embolization  Management per primary team  Paroxysmal atrial fibrillation (HCC)  Management per primary team  Diabetic ulcer of left midfoot associated with type 2 diabetes mellitus, limited to breakdown of skin (HCC)  Management per primary team  Hyponatremia  This is due to lack of free water clearance and probably excessive fluid intake  Fluid restrict 1200 cc per 24 hours  138 mEq sodium bath on dialysis today  Ultrafiltration with dialysis will also help with correction of hyponatremia    I have reviewed the nephrology recommendations including increasing Epogen, with internal medicine team, and we are in agreement with renal plan including the information outlined above.    SUBJECTIVE / 24H INTERVAL HISTORY:  Denies dyspnea.  Has leg swelling.    OBJECTIVE:  Current Weight: Weight - Scale: 99.1 kg (218 lb 6.4 oz)  Vitals:    04/14/25 0605 04/14/25 1548 04/14/25 2010 04/15/25 0519   BP: 143/54 (!) 154/49 156/67 134/62   BP Location: Right arm Right arm Left arm Left arm   Pulse: 55 70 62 56   Resp: 17 17 16 17   Temp: 97.8 °F (36.6 °C) 98.2 °F (36.8 °C) 98.1 °F (36.7 °C) 97.6 °F (36.4 °C)  SHEILA JACKSON RN [facility] [phone]   TempSrc: Oral Oral Oral Oral   SpO2: 97% 97% 94% 96%   Weight:       Height:           Intake/Output Summary (Last 24 hours) at 4/15/2025 0616  Last data filed at 4/14/2025 1804  Gross per 24 hour   Intake 240 ml   Output --   Net 240 ml     Review of Systems   Constitutional:  Negative for chills and fever.   HENT:  Negative for ear pain and sore throat.    Eyes:  Negative for pain and visual disturbance.   Respiratory:  Negative for cough and shortness of breath.    Cardiovascular:  Negative for chest pain and palpitations.   Gastrointestinal:  Negative for abdominal pain and vomiting.   Genitourinary:  Negative for dysuria and hematuria.   Musculoskeletal:  Negative for arthralgias and back pain.   Skin:  Negative for color change and rash.   Neurological:  Negative for seizures and syncope.   All other systems reviewed and are negative.    Physical Exam  Vitals and nursing note reviewed.   Constitutional:       General: He is not in acute distress.     Appearance: He is well-developed.   HENT:      Head: Normocephalic and atraumatic.   Eyes:      Conjunctiva/sclera: Conjunctivae normal.   Cardiovascular:      Rate and Rhythm: Normal rate and regular rhythm.      Pulses: Normal pulses.      Heart sounds: Normal heart sounds. No murmur heard.     Comments: Right IJ dialysis catheter present   Pulmonary:      Effort: Pulmonary effort is normal. No respiratory distress.      Breath sounds: Normal breath sounds.   Abdominal:      Palpations: Abdomen is soft.   Musculoskeletal:         General: No swelling.      Cervical back: Neck supple.      Right lower leg: Edema present.      Left lower leg: Edema present.   Skin:     General: Skin is warm and dry.      Capillary Refill: Capillary refill takes less than 2 seconds.   Neurological:      Mental Status: He is alert.   Psychiatric:         Mood and Affect: Mood normal.       Medications:    Current Facility-Administered Medications:     acetaminophen (TYLENOL)  tablet 975 mg, 975 mg, Oral, Q8H KEMAL, Damian Cerda MD, 975 mg at 04/15/25 0610    allopurinol (ZYLOPRIM) tablet 100 mg, 100 mg, Oral, Daily, Damian Cerda MD, 100 mg at 04/14/25 0836    apixaban (ELIQUIS) tablet 5 mg, 5 mg, Oral, BID, Damian Cerda MD, 5 mg at 04/14/25 1720    atorvastatin (LIPITOR) tablet 80 mg, 80 mg, Oral, Daily With Dinner, Damian Cerda MD, 80 mg at 04/14/25 1720    epoetin jessica (EPOGEN,PROCRIT) injection 3,000 Units, 3,000 Units, Intravenous, After Dialysis, 3,000 Units at 04/12/25 1031 **AND** epoetin jessica (EPOGEN,PROCRIT) injection 2,000 Units, 2,000 Units, Intravenous, After Dialysis, Damian Cerda MD, 2,000 Units at 04/12/25 1031    gabapentin (NEURONTIN) capsule 100 mg, 100 mg, Oral, Once per day on Monday Wednesday Friday, Damian Cerda MD, 100 mg at 04/14/25 0836    lidocaine (LIDODERM) 5 % patch 1 patch, 1 patch, Topical, Daily PRN, Damian Cerda MD, 1 patch at 04/13/25 2046    melatonin tablet 6 mg, 6 mg, Oral, HS, Jayden Rowland MD, 6 mg at 04/14/25 2135    methocarbamol (ROBAXIN) tablet 1,000 mg, 1,000 mg, Oral, Q8H KEMAL, Jayden Rowland MD, 1,000 mg at 04/15/25 0610    naloxone (NARCAN) 0.04 mg/mL syringe 0.04 mg, 0.04 mg, Intravenous, Q1MIN PRN, Damian Cerda MD    NIFEdipine (PROCARDIA XL) 24 hr tablet 30 mg, 30 mg, Oral, After Dinner, Damian Cerda MD, 30 mg at 04/14/25 1720    oxyCODONE (ROXICODONE) IR tablet 5 mg, 5 mg, Oral, Q4H PRN **OR** oxyCODONE (ROXICODONE) immediate release tablet 10 mg, 10 mg, Oral, Q4H PRN, Damian Cerda MD, 10 mg at 04/15/25 3597    oxyCODONE (ROXICODONE) split tablet 2.5 mg, 2.5 mg, Oral, Q8H PRN, Jayden Rowland MD    senna-docusate sodium (SENOKOT S) 8.6-50 mg per tablet 1 tablet, 1 tablet, Oral, HS, Damian Cerda MD, 1 tablet at 04/14/25 2138    sevelamer (RENAGEL) tablet 1,600 mg, 1,600 mg, Oral, TID With Meals, Damian Cerda MD, 1,600 mg at 04/14/25 1720    Laboratory Results:  Results from last 7 days   Lab Units 04/15/25  2773  "04/15/25  0428 04/14/25  0611 04/13/25  1307 04/11/25  0508 04/10/25  0820 04/09/25  0541 04/08/25  1414 04/08/25  0801   WBC Thousand/uL  --  8.35 9.11  --  7.55 7.81 8.98  --  11.97*   HEMOGLOBIN g/dL  --  7.2* 7.6* 8.0* 7.4* 7.4* 7.3* 7.0* 7.0*   HEMATOCRIT %  --  22.8* 24.7* 25.4* 25.0* 23.7* 23.5* 22.7* 21.6*   PLATELETS Thousands/uL  --  318 336  --  378 349 327  --  292   POTASSIUM mmol/L 4.7  --  4.7  --  4.4 4.4 4.1  --  5.6*   CHLORIDE mmol/L 89*  --  93*  --  96 93* 94*  --  93*   CO2 mmol/L 24  --  26  --  28 28 26  --  23   BUN mg/dL 55*  --  39*  --  30* 52* 34*  --  62*   CREATININE mg/dL 7.54*  --  6.16*  --  4.63* 7.50* 5.53*  --  8.57*   CALCIUM mg/dL 8.9  --  9.1  --  9.1 9.0 8.9  --  8.9   MAGNESIUM mg/dL  --   --   --   --   --  2.2 2.2  --   --    PHOSPHORUS mg/dL 4.6*  --  4.2*  --   --  5.3* 4.3*  --   --        Portions of the record may have been created with voice recognition software. Occasional wrong word or \"sound a like\" substitutions may have occurred due to the inherent limitations of voice recognition software. Read the chart carefully and recognize, using context, where substitutions have occurred.If you have any questions, please contact the dictating provider.  .  "

## 2025-05-06 NOTE — PROGRESS NOTE ADULT - SUBJECTIVE AND OBJECTIVE BOX
Spoke with patient son- through the . He verbalized understanding. Phone call completed.    Podiatry pager #: [phone]    Patient is a 67y old  Female who presents with a chief complaint of Heart failure. Podiatry consulted today for evaluation of lesion on the bottom of her right foot.    "66F with PMH morbid obesity, moderate persistant asthma, HTN, HLD, DM, CKD3, HFpEF on 4-6L NC oxygen at baseline (not on BiPAP/CPAP qhs), presenting with SOB/LEGER c/f HF exacerbation vs. asthma exacerbation vs. PE vs. exacerbation of pHTN" (14 Oct 2022 11:09)      HPI:  Patient is a 66 yo F with PMH morbid obesity, asthma, HTN, HLD, DM, CKD3 (BL Cr ~1.6), HFpEF on 3-4L NC oxygen at baseline (not on BiPAP/CPAP qhs), pHTN, endometrial cancer s/p MEGAN/SBO, DVT (resolved), spinal stenosis with chronic back pain, diabetic neuropathy, Charcot foot, chronic venous statis presenting from PCP's office due to concern for HF exacerbation. Patient reports SOB/LEGER for many years, however it has been progressively worsening for the last few months. Patient states that she gets short of breath with every movement, even when being assisted cleaning herself on her shower chair at home. She also states that most nights she wakes up in the middle of the night with severe shortness of breath and has to transition to a reclining chair to sleep. At BL requires 3 pillows to sleep given discomfort/pain from spinal stenosis, but is unable to sleep for prolonged periods regardless. Patient states she does not use her Advair daily but is using her nebulizer daily. States her legs feel more swollen than normal, and reports she feels oozing from her L shin. Reports daily CP of cramping quality x2 months, but states intensity has increased in the last few weeks. The patient also notes that she has been having daily chills for the last 2 weeks, but did not record a fever. Reports dizziness and wheezing (chronic but recent increase in frequency). She denies headache, cough, congestion, sick contacts. Has chronic nausea, but denies vomiting. Denies dysuria, abdominal pain, any changes in urination or stools.     ED course: afebrile, HR 86, BP 140s/80s, RR 20 at 96% on 6L NC. WBC 11.8, Na 140, K 5.7, Cr 1.55, BNP 1174. RVP/COV neg. CXR clear. EKG unremarkable. s/p Lasix 80 IV x1, Lokelma x1, oxy 60 x1. BCx sent. (12 Oct 2022 21:07)      PAST MEDICAL & SURGICAL HISTORY:  Diabetes Mellitus Type II      HTN (Hypertension)      Endometrial Hyperplasia      Cervical Stenosis of Spine      Spinal Stenosis, Lumbar      Deep Vein Thrombosis (DVT)  Left leg, 2004, treated and resolved      Dyslipidemia      Cataract      Morbid Obesity      Vitamin D deficiency      Insomnia      CKD (chronic kidney disease)  ~ III      Congestive heart failure  ~ HFpEF      Uterine cancer      Asthma      On home oxygen therapy      Gait difficulty  ~ u ses walker      Cataract extracted with lens implant 1998  Right      C Section 1994      Cholecystectomy/appendectomy @ age 25      D&amp;C x2 1980&#x27;s      D&amp;C 2008  hysteroscopy, endometrial hyperplasia, 2009      Cervical Spinal Stenosis surgery x2 (01/2002, 7/2002)      Tonsillectomy as a child      Endometrial biopsy 12/02/09      H/O laser iridotomy  left eye, 2016      H/O colonoscopy  1998      S/P total abdominal hysterectomy and bilateral salpingo-oophorectomy      S/P appendectomy      S/P cholecystectomy          MEDICATIONS  (STANDING):  albuterol/ipratropium for Nebulization 3 milliLiter(s) Nebulizer every 6 hours  ammonium lactate 12% Lotion 1 Application(s) Topical every 12 hours  aspirin enteric coated 81 milliGRAM(s) Oral daily  atorvastatin 80 milliGRAM(s) Oral at bedtime  budesonide 160 MICROgram(s)/formoterol 4.5 MICROgram(s) Inhaler 2 Puff(s) Inhalation two times a day  cholecalciferol 2000 Unit(s) Oral daily  dextrose 50% Injectable 25 Gram(s) IV Push once  dextrose 50% Injectable 12.5 Gram(s) IV Push once  dextrose 50% Injectable 25 Gram(s) IV Push once  dextrose Oral Gel 15 Gram(s) Oral once  furosemide   Injectable 40 milliGRAM(s) IV Push daily  glucagon  Injectable 1 milliGRAM(s) IntraMuscular once  heparin   Injectable 5000 Unit(s) SubCutaneous every 8 hours  insulin glargine Injectable (LANTUS) 40 Unit(s) SubCutaneous at bedtime  insulin lispro (ADMELOG) corrective regimen sliding scale   SubCutaneous three times a day before meals  insulin lispro (ADMELOG) corrective regimen sliding scale   SubCutaneous at bedtime  loratadine 10 milliGRAM(s) Oral daily  nystatin Powder 1 Application(s) Topical three times a day  oxyCODONE  ER Tablet 60 milliGRAM(s) Oral every 12 hours    MEDICATIONS  (PRN):  acetaminophen     Tablet .. 650 milliGRAM(s) Oral every 6 hours PRN Temp greater or equal to 38C (100.4F), Mild Pain (1 - 3)  ondansetron    Tablet 4 milliGRAM(s) Oral every 8 hours PRN Nausea and/or Vomiting  oxyCODONE    IR 10 milliGRAM(s) Oral every 4 hours PRN Severe Pain (7 - 10)      Allergies    adhesives (Rash)  Bactrim (Flushing)  latex (Rash)  wool- rash, itch (Other)    Intolerances        VITALS:    Vital Signs Last 24 Hrs  T(C): 36.6 (14 Oct 2022 12:15), Max: 37.1 (13 Oct 2022 16:18)  T(F): 97.8 (14 Oct 2022 12:15), Max: 98.8 (13 Oct 2022 16:18)  HR: 79 (14 Oct 2022 12:15) (76 - 92)  BP: 136/90 (14 Oct 2022 12:15) (119/63 - 144/70)  BP(mean): --  RR: 18 (14 Oct 2022 12:15) (18 - 19)  SpO2: 94% (14 Oct 2022 12:15) (93% - 96%)    Parameters below as of 14 Oct 2022 12:15  Patient On (Oxygen Delivery Method): nasal cannula  O2 Flow (L/min): 4      LABS:                          11.2   8.87  )-----------( 227      ( 14 Oct 2022 07:00 )             37.2       10-14    140  |  99  |  34<H>  ----------------------------<  136<H>  4.4   |  30  |  1.83<H>    Ca    9.1      14 Oct 2022 07:05  Phos  3.6     10-14  Mg     2.0     10-14    TPro  7.1  /  Alb  3.9  /  TBili  0.8  /  DBili  x   /  AST  18  /  ALT  18  /  AlkPhos  130<H>  10-13      CAPILLARY BLOOD GLUCOSE      POCT Blood Glucose.: 210 mg/dL (14 Oct 2022 11:42)  POCT Blood Glucose.: 139 mg/dL (14 Oct 2022 07:30)  POCT Blood Glucose.: 197 mg/dL (13 Oct 2022 21:15)  POCT Blood Glucose.: 148 mg/dL (13 Oct 2022 17:27)  POCT Blood Glucose.: 198 mg/dL (13 Oct 2022 16:19)      PT/INR - ( 12 Oct 2022 18:17 )   PT: 12.5 sec;   INR: 1.09 ratio         PTT - ( 12 Oct 2022 18:17 )  PTT:26.1 sec    LOWER EXTREMITY PHYSICAL EXAM:    Vasular: DP/PT _1/4, B/L, CFT <_2 seconds B/L, Temperature gradient WNL_, B/L.   Neuro: Epicritic sensation _Absent to the level of Toes_, B/L.  Musculoskeletal/Ortho: Previous Charcot changes right foot  Skin: Dystrophic hypertrophic brittle toenails with subungual debris 10  Wound #1:   Location:Plantar lateral aspect right midfoot  Size:1 cm diameter  Depth: Superficial  Wound bed: Hyperkeratotic lesion overlying pre-trophic ulceration  Drainage: None, dry  Odor: None  Periwound: No clinical signs of infection  Etiology: Present on admission    RADIOLOGY & ADDITIONAL STUDIES:

## 2025-05-08 NOTE — PROGRESS NOTE ADULT - PROBLEM/PLAN-7
Addended by: PHILIP ODONNELL on: 5/8/2025 12:20 PM     Modules accepted: Orders     DISPLAY PLAN FREE TEXT

## 2025-06-13 ENCOUNTER — OFFICE (OUTPATIENT)
Dept: URBAN - METROPOLITAN AREA CLINIC 12 | Facility: CLINIC | Age: 70
Setting detail: OPHTHALMOLOGY
End: 2025-06-13
Payer: COMMERCIAL

## 2025-06-13 DIAGNOSIS — H40.032: ICD-10-CM

## 2025-06-13 DIAGNOSIS — H43.393: ICD-10-CM

## 2025-06-13 DIAGNOSIS — E11.359: ICD-10-CM

## 2025-06-13 DIAGNOSIS — H25.12: ICD-10-CM

## 2025-06-13 DIAGNOSIS — E11.9: ICD-10-CM

## 2025-06-13 PROCEDURE — 92002 INTRM OPH EXAM NEW PATIENT: CPT | Performed by: OPHTHALMOLOGY

## 2025-06-13 ASSESSMENT — REFRACTION_CURRENTRX
OS_OVR_VA: 20/
OD_CYLINDER: -0.50
OS_SPHERE: -1.00
OD_OVR_VA: 20/
OD_SPHERE: -0.25
OD_ADD: +1.50
OD_AXIS: 038
OS_ADD: +1.50

## 2025-06-13 ASSESSMENT — VISUAL ACUITY
OS_BCVA: 20/40
OD_BCVA: 20/50

## 2025-06-13 ASSESSMENT — KERATOMETRY
OD_K1POWER_DIOPTERS: UNABLE
OS_K1POWER_DIOPTERS: UNABLE

## 2025-06-13 ASSESSMENT — REFRACTION_AUTOREFRACTION
OS_SPHERE: UNABLE
OD_SPHERE: UNABLE

## 2025-06-19 ENCOUNTER — OFFICE (OUTPATIENT)
Dept: URBAN - METROPOLITAN AREA CLINIC 88 | Facility: CLINIC | Age: 70
Setting detail: OPHTHALMOLOGY
End: 2025-06-19
Payer: COMMERCIAL

## 2025-06-19 ENCOUNTER — RX ONLY (RX ONLY)
Age: 70
End: 2025-06-19

## 2025-06-19 DIAGNOSIS — H25.12: ICD-10-CM

## 2025-06-19 DIAGNOSIS — H43.393: ICD-10-CM

## 2025-06-19 DIAGNOSIS — H26.491: ICD-10-CM

## 2025-06-19 PROCEDURE — 92012 INTRM OPH EXAM EST PATIENT: CPT | Performed by: OPHTHALMOLOGY

## 2025-06-19 ASSESSMENT — VISUAL ACUITY
OS_BCVA: 20/60
OD_BCVA: 20/100
OS_BCVA: 20/60
OD_BCVA: 20/100

## 2025-06-19 ASSESSMENT — KERATOMETRY
OS_K1POWER_DIOPTERS: UNABLE
OS_K1POWER_DIOPTERS: UNABLE
OD_K1POWER_DIOPTERS: UNABLE
OD_K1POWER_DIOPTERS: UNABLE

## 2025-06-19 ASSESSMENT — CONFRONTATIONAL VISUAL FIELD TEST (CVF)
OD_FINDINGS: FULL
OS_FINDINGS: FULL
OS_FINDINGS: FULL
OD_FINDINGS: FULL

## 2025-06-19 ASSESSMENT — REFRACTION_AUTOREFRACTION
OD_SPHERE: UNABLE
OD_SPHERE: UNABLE
OS_SPHERE: UNABLE
OS_SPHERE: UNABLE

## 2025-06-19 ASSESSMENT — TONOMETRY
OD_IOP_MMHG: 14
OS_IOP_MMHG: 13

## 2025-06-30 NOTE — DISCHARGE NOTE ADULT - NSFTFHOMEHTHYNRD_GEN_ALL_CORE
Problem: Chronic Conditions and Co-morbidities  Goal: Patient's chronic conditions and co-morbidity symptoms are monitored and maintained or improved  Outcome: Progressing     Problem: Discharge Planning  Goal: Discharge to home or other facility with appropriate resources  Outcome: Progressing     Problem: Pain  Goal: Verbalizes/displays adequate comfort level or baseline comfort level  Outcome: Progressing     Problem: Safety - Adult  Goal: Free from fall injury  Outcome: Progressing     Problem: Skin/Tissue Integrity  Goal: Skin integrity remains intact  Description: 1.  Monitor for areas of redness and/or skin breakdown  2.  Assess vascular access sites hourly  3.  Every 4-6 hours minimum:  Change oxygen saturation probe site  4.  Every 4-6 hours:  If on nasal continuous positive airway pressure, respiratory therapy assess nares and determine need for appliance change or resting period  Outcome: Progressing     Problem: ABCDS Injury Assessment  Goal: Absence of physical injury  Outcome: Progressing      Yes

## 2025-07-02 ENCOUNTER — RX ONLY (RX ONLY)
Age: 70
End: 2025-07-02

## 2025-07-02 ENCOUNTER — OFFICE (OUTPATIENT)
Dept: URBAN - METROPOLITAN AREA CLINIC 12 | Facility: CLINIC | Age: 70
Setting detail: OPHTHALMOLOGY
End: 2025-07-02
Payer: COMMERCIAL

## 2025-07-02 DIAGNOSIS — H26.491: ICD-10-CM

## 2025-07-02 PROBLEM — H25.12 CATARACT SENILE NUCLEAR SCLEROSIS; LEFT EYE: Status: ACTIVE | Noted: 2025-06-19

## 2025-07-02 PROBLEM — H40.032: Status: ACTIVE | Noted: 2025-06-13

## 2025-07-02 PROBLEM — H40.032 NARROW ANGLE GLAUCOMA SUSPECT; LEFT EYE: Status: ACTIVE | Noted: 2025-06-19

## 2025-07-02 PROBLEM — H52.7 REFRACTIVE ERROR ; BOTH EYES: Status: ACTIVE | Noted: 2025-06-13

## 2025-07-02 PROBLEM — H43.393 VITREOUS FLOATERS; BOTH EYES: Status: ACTIVE | Noted: 2025-06-13

## 2025-07-02 PROCEDURE — 66821 AFTER CATARACT LASER SURGERY: CPT | Mod: RT | Performed by: STUDENT IN AN ORGANIZED HEALTH CARE EDUCATION/TRAINING PROGRAM

## 2025-07-02 ASSESSMENT — KERATOMETRY
OS_K1POWER_DIOPTERS: UNABLE
OD_K1POWER_DIOPTERS: UNABLE

## 2025-07-02 ASSESSMENT — REFRACTION_AUTOREFRACTION
OS_SPHERE: UNABLE
OD_SPHERE: UNABLE

## 2025-07-02 ASSESSMENT — VISUAL ACUITY
OS_BCVA: 20/50-
OD_BCVA: 20/50-

## 2025-07-02 ASSESSMENT — CONFRONTATIONAL VISUAL FIELD TEST (CVF)
OD_FINDINGS: FULL
OS_FINDINGS: FULL

## 2025-07-22 NOTE — ED ADULT NURSE NOTE - NSICDXPASTMEDICALHX_GEN_ALL_CORE_FT
3 (mild pain) PAST MEDICAL HISTORY:  Asthma     Cataract     Cervical Stenosis of Spine     CKD (chronic kidney disease) ~ III    Congestive heart failure ~ HFpEF    Deep Vein Thrombosis (DVT) Left leg, 2004, treated and resolved    Diabetes Mellitus Type II     Dyslipidemia     Endometrial Hyperplasia     Gait difficulty ~ u ses walker    HTN (Hypertension)     Insomnia     Morbid Obesity     On home oxygen therapy     Spinal Stenosis, Lumbar     Uterine cancer     Vitamin D deficiency

## 2025-07-29 ENCOUNTER — OFFICE (OUTPATIENT)
Dept: URBAN - METROPOLITAN AREA CLINIC 12 | Facility: CLINIC | Age: 70
Setting detail: OPHTHALMOLOGY
End: 2025-07-29
Payer: COMMERCIAL

## 2025-07-29 DIAGNOSIS — H26.491: ICD-10-CM

## 2025-07-29 PROCEDURE — 99024 POSTOP FOLLOW-UP VISIT: CPT | Performed by: STUDENT IN AN ORGANIZED HEALTH CARE EDUCATION/TRAINING PROGRAM

## 2025-07-29 ASSESSMENT — KERATOMETRY
OS_K1POWER_DIOPTERS: UNABLE
OD_K1POWER_DIOPTERS: UNABLE

## 2025-07-29 ASSESSMENT — VISUAL ACUITY
OS_BCVA: 20/50-1
OD_BCVA: 20/40-2

## 2025-07-29 ASSESSMENT — CONFRONTATIONAL VISUAL FIELD TEST (CVF)
OS_FINDINGS: FULL
OD_FINDINGS: FULL

## 2025-07-29 ASSESSMENT — REFRACTION_AUTOREFRACTION
OS_SPHERE: UNABLE
OD_SPHERE: UNABLE

## 2025-08-14 ENCOUNTER — APPOINTMENT (OUTPATIENT)
Dept: ORTHOPEDIC SURGERY | Facility: CLINIC | Age: 70
End: 2025-08-14
Payer: MEDICARE

## 2025-08-14 VITALS — BODY MASS INDEX: 51.91 KG/M2 | HEIGHT: 63 IN | WEIGHT: 293 LBS

## 2025-08-14 DIAGNOSIS — M17.12 UNILATERAL PRIMARY OSTEOARTHRITIS, LEFT KNEE: ICD-10-CM

## 2025-08-14 PROCEDURE — 20610 DRAIN/INJ JOINT/BURSA W/O US: CPT | Mod: LT

## 2025-08-14 PROCEDURE — 99204 OFFICE O/P NEW MOD 45 MIN: CPT | Mod: 25
